# Patient Record
Sex: FEMALE | Race: WHITE | Employment: OTHER | URBAN - METROPOLITAN AREA
[De-identification: names, ages, dates, MRNs, and addresses within clinical notes are randomized per-mention and may not be internally consistent; named-entity substitution may affect disease eponyms.]

---

## 2017-01-02 DIAGNOSIS — R11.0 NAUSEA: Primary | ICD-10-CM

## 2017-01-03 ENCOUNTER — TELEPHONE (OUTPATIENT)
Dept: TRANSPLANT | Facility: CLINIC | Age: 52
End: 2017-01-03

## 2017-01-03 DIAGNOSIS — E13.9 POST-PANCREATECTOMY DIABETES (H): Primary | ICD-10-CM

## 2017-01-03 DIAGNOSIS — K86.89 PANCREATIC INSUFFICIENCY: ICD-10-CM

## 2017-01-03 DIAGNOSIS — E55.9 VITAMIN D DEFICIENCY: ICD-10-CM

## 2017-01-03 DIAGNOSIS — Z90.410 POST-PANCREATECTOMY DIABETES (H): Primary | ICD-10-CM

## 2017-01-03 DIAGNOSIS — E89.1 POST-PANCREATECTOMY DIABETES (H): Primary | ICD-10-CM

## 2017-01-03 NOTE — TELEPHONE ENCOUNTER
Spoke with Chantell about rescheduling her appointment with Dr. Maher. Chantell agreed to an 8:00 morning appointment on 1/19/17. Chantell verbalized understanding of the date and time including arrival at 7:45 to check-in.

## 2017-01-04 RX ORDER — ONDANSETRON 4 MG/1
TABLET, ORALLY DISINTEGRATING ORAL
Qty: 60 TABLET | Refills: 1 | Status: SHIPPED | OUTPATIENT
Start: 2017-01-04 | End: 2017-05-10

## 2017-01-04 NOTE — TELEPHONE ENCOUNTER
ondansetron      Last Written Prescription Date:  8/29/16  Last Fill Quantity: 60,   # refills: 1  Last Office Visit : 12/20/16  Future Office visit:  none

## 2017-01-06 ENCOUNTER — TELEPHONE (OUTPATIENT)
Dept: INTERNAL MEDICINE | Facility: CLINIC | Age: 52
End: 2017-01-06

## 2017-01-07 DIAGNOSIS — E03.9 HYPOTHYROIDISM: Primary | ICD-10-CM

## 2017-01-10 RX ORDER — LEVOTHYROXINE SODIUM 112 UG/1
112 TABLET ORAL DAILY
Qty: 90 TABLET | Refills: 3 | Status: SHIPPED
Start: 2017-01-10 | End: 2018-04-10

## 2017-01-11 NOTE — TELEPHONE ENCOUNTER
levothyroxine      Last Written Prescription Date:  9/22/15  Last Fill Quantity: 100,   # refills: 3  Last Office Visit : 12/20/16  Future Office visit:  NONE  TSH   Date Value Ref Range Status   11/16/2016 0.15* 0.40 - 4.00 mU/L Final

## 2017-01-12 ENCOUNTER — PRE VISIT (OUTPATIENT)
Dept: GASTROENTEROLOGY | Facility: CLINIC | Age: 52
End: 2017-01-12

## 2017-01-12 NOTE — TELEPHONE ENCOUNTER
Re-scheduled from 11/28/16    1.  Date/reason for appt: 2/7/17 - New IBD & Abd Pain  2.  Referring provider: ED/Hospital f/u  3.  Call to patient (Yes / No - short description): no, hospital f/u  4.  Previous care at / records requested from:              - [facility] ED/Hospital -- Records and imaging in Epic/Pacs

## 2017-01-18 DIAGNOSIS — Z90.410 POST-PANCREATECTOMY DIABETES (H): Primary | ICD-10-CM

## 2017-01-18 DIAGNOSIS — E89.1 POST-PANCREATECTOMY DIABETES (H): Primary | ICD-10-CM

## 2017-01-18 DIAGNOSIS — E13.9 POST-PANCREATECTOMY DIABETES (H): Primary | ICD-10-CM

## 2017-01-18 DIAGNOSIS — K86.89 PANCREATIC INSUFFICIENCY: ICD-10-CM

## 2017-01-18 DIAGNOSIS — Z90.410 HISTORY OF PANCREATECTOMY: ICD-10-CM

## 2017-01-19 ENCOUNTER — OFFICE VISIT (OUTPATIENT)
Dept: TRANSPLANT | Facility: CLINIC | Age: 52
End: 2017-01-19
Attending: PEDIATRICS
Payer: MEDICARE

## 2017-01-19 ENCOUNTER — TELEPHONE (OUTPATIENT)
Dept: GASTROENTEROLOGY | Facility: CLINIC | Age: 52
End: 2017-01-19

## 2017-01-19 VITALS
TEMPERATURE: 98.2 F | RESPIRATION RATE: 16 BRPM | OXYGEN SATURATION: 98 % | DIASTOLIC BLOOD PRESSURE: 66 MMHG | HEART RATE: 72 BPM | BODY MASS INDEX: 25.83 KG/M2 | SYSTOLIC BLOOD PRESSURE: 102 MMHG | WEIGHT: 140.4 LBS | HEIGHT: 62 IN

## 2017-01-19 VITALS — WEIGHT: 140.4 LBS | HEIGHT: 62 IN | BODY MASS INDEX: 25.83 KG/M2

## 2017-01-19 DIAGNOSIS — E55.9 VITAMIN D DEFICIENCY: ICD-10-CM

## 2017-01-19 DIAGNOSIS — Z90.410 HISTORY OF PANCREATECTOMY: ICD-10-CM

## 2017-01-19 DIAGNOSIS — E89.1 POST-PANCREATECTOMY DIABETES (H): ICD-10-CM

## 2017-01-19 DIAGNOSIS — E13.9 POST-PANCREATECTOMY DIABETES (H): ICD-10-CM

## 2017-01-19 DIAGNOSIS — K86.89 PANCREATIC INSUFFICIENCY: ICD-10-CM

## 2017-01-19 DIAGNOSIS — Z90.410 POST-PANCREATECTOMY DIABETES (H): ICD-10-CM

## 2017-01-19 DIAGNOSIS — E89.1 POST-PANCREATECTOMY DIABETES (H): Primary | ICD-10-CM

## 2017-01-19 DIAGNOSIS — Z90.410 POST-PANCREATECTOMY DIABETES (H): Primary | ICD-10-CM

## 2017-01-19 DIAGNOSIS — E13.9 POST-PANCREATECTOMY DIABETES (H): Primary | ICD-10-CM

## 2017-01-19 LAB
ALBUMIN SERPL-MCNC: 3.4 G/DL (ref 3.4–5)
ALP SERPL-CCNC: 64 U/L (ref 40–150)
ALT SERPL W P-5'-P-CCNC: 20 U/L (ref 0–50)
ANION GAP SERPL CALCULATED.3IONS-SCNC: 8 MMOL/L (ref 3–14)
AST SERPL W P-5'-P-CCNC: 17 U/L (ref 0–45)
BILIRUB SERPL-MCNC: 0.2 MG/DL (ref 0.2–1.3)
BUN SERPL-MCNC: 9 MG/DL (ref 7–30)
C PEPTIDE SERPL-MCNC: 1.5 NG/ML (ref 0.9–6.9)
C PEPTIDE SERPL-MCNC: 1.9 NG/ML (ref 0.9–6.9)
C PEPTIDE SERPL-MCNC: 2.2 NG/ML (ref 0.9–6.9)
CALCIUM SERPL-MCNC: 8.7 MG/DL (ref 8.5–10.1)
CHLORIDE SERPL-SCNC: 108 MMOL/L (ref 94–109)
CO2 SERPL-SCNC: 27 MMOL/L (ref 20–32)
CREAT SERPL-MCNC: 0.65 MG/DL (ref 0.52–1.04)
ERYTHROCYTE [DISTWIDTH] IN BLOOD BY AUTOMATED COUNT: 17 % (ref 10–15)
FERRITIN SERPL-MCNC: 8 NG/ML (ref 8–252)
GFR SERPL CREATININE-BSD FRML MDRD: ABNORMAL ML/MIN/1.7M2
GLUCOSE SERPL-MCNC: 140 MG/DL (ref 70–99)
GLUCOSE SERPL-MCNC: 153 MG/DL (ref 70–99)
GLUCOSE SERPL-MCNC: 202 MG/DL (ref 70–99)
HBA1C MFR BLD: 5.9 % (ref 4.3–6)
HCT VFR BLD AUTO: 30.6 % (ref 35–47)
HGB BLD-MCNC: 10 G/DL (ref 11.7–15.7)
IRON SATN MFR SERPL: 12 % (ref 15–46)
IRON SERPL-MCNC: 40 UG/DL (ref 35–180)
MCH RBC QN AUTO: 29.9 PG (ref 26.5–33)
MCHC RBC AUTO-ENTMCNC: 32.7 G/DL (ref 31.5–36.5)
MCV RBC AUTO: 92 FL (ref 78–100)
PLATELET # BLD AUTO: 531 10E9/L (ref 150–450)
POTASSIUM SERPL-SCNC: 4 MMOL/L (ref 3.4–5.3)
PREALB SERPL IA-MCNC: 23 MG/DL (ref 15–45)
PROT SERPL-MCNC: 6.6 G/DL (ref 6.8–8.8)
RBC # BLD AUTO: 3.34 10E12/L (ref 3.8–5.2)
SODIUM SERPL-SCNC: 143 MMOL/L (ref 133–144)
TIBC SERPL-MCNC: 344 UG/DL (ref 240–430)
VIT B12 SERPL-MCNC: 1051 PG/ML (ref 193–986)
WBC # BLD AUTO: 5.2 10E9/L (ref 4–11)

## 2017-01-19 PROCEDURE — 40000269 ZZH STATISTIC NO CHARGE FACILITY FEE: Mod: ZF

## 2017-01-19 PROCEDURE — 99211 OFF/OP EST MAY X REQ PHY/QHP: CPT | Mod: ZF

## 2017-01-19 PROCEDURE — 82306 VITAMIN D 25 HYDROXY: CPT | Performed by: PEDIATRICS

## 2017-01-19 PROCEDURE — 84681 ASSAY OF C-PEPTIDE: CPT | Performed by: PEDIATRICS

## 2017-01-19 NOTE — Clinical Note
1/19/2017       RE: Chantell Kidd  [address]  [place] [facility] [address]     Dear Colleague,    Thank you for referring your patient, Chantell Kidd, to the [facility] SOLID ORGAN TRANSPLANT at [facility] acute hospital. Please see a copy of my visit note below.    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     Post-pancreatectomy diabetes (H)     Type 1 diabetes mellitus (H)     Migraine     Abdominal muscle strain     CIERRA (generalized anxiety disorder)     Major depressive disorder, recurrent episode, moderate (H)     CMC DJD(carpometacarpal degenerative joint disease), localized primary     Exocrine pancreatic insufficiency     Hypothyroidism     Hypoglycemia     Mood disorder due to a general medical condition     Decreased oral intake     Odynophagia     Iron deficiency     Anemia, iron deficiency     Adrenal insufficiency (H)     S/P hernia repair     Nausea       HPI:  Chantell is a 51 year old female here for follow up of total pancreatectomy and islet autotransplant performed on January 6, 2012.  At the time of the procedure, the patient received 420,000 IEQ, or 5,438 IEQ/kg body weight.  She did not have diabetes before the procedure.  Early post-operative course was complicated by RLQ with appendicitis, eventually required appendectomy.    Despite the high islet mass transplanted, Chantell's post-operative course was initially remarkable for high insulin needs.  She in fact does have islet function, as previously documented by mixed meal tests, but she was insulin resistant, requiring high doses of insulin when on MDI therapy.  She also had frequent day to day  variability, and fluctuating patterns with illness and healthier times, as well as a complex regimen, all of which make her an excellent candidate for an insulin pump which she started in Feb 2014.  Extremely concerning was an episode of severe hypoglycemia in November 2013 at which time she was found unconscious.  Suspect at that time she was on too much basal insulin and because she was ill and not eating at the time, dropped far too low overnight.   In early 2014, she was started on an insulin pump with CGM.  Remarkably, her insulin needs have dropped dramatically on an insulin pump.  She was then relatively stable until 2016.  She was again admitted to the hospital on March 15 and March 29, 2016 for hypoglycemia, that appears to be secondary to both exogenous insulin and possible central adrenal insufficiency.   At that time she had the following lab findings:  On 3/29/16, elevated insulin with low C-peptide during BG 42 mg/dL around 5pm, and then again same pattern with BG 50s at 10pm.  Chantell is pretty clear that she did not take insuiln that day on 3/29/16. She also had three cortisol levels-- including one during hypoglycemia, one at around 4am (possibly also during hypoglycemia) and one 8am draw that were all low-- all 0.6- 1.6 range.    Of note, she did have a kenalog injection one week earlier on 3/24/16, just a few days prior to that draw and was also receiving narcotics in hospital (but not at home).      Picture is also complicated by non-diabetes history which includes the following (some of which is new since last visit):  - 7/6/2016 EGD identified diffuse candidiasis through entire esophagous.  Pt has also had recurrent oral thrush in 2016  - Recurrent chronic abdominal pain  - Recurrent vomiting of unclear etiology but G-T placed 10/2016 and converted to GJ 11/2016.  She feels much better on GJ feeds.  Unclear if she has any gastroparesis.  Suboptimal study in March 2016 showed 100% retention at 1  hour and then rapid emptying.  She re-establishes care with Dr. Estrada in a few weeks.   - Hernia repair performed by general surgery 9/15/16  - TPIAT team was not involved in above two items, does appear inpatient endo team was consulted to assist with changes in pump settings for the GJ feeds.        New history today:  1)  Diabetes:    Many changes since our last visit.  She was in my clinic 9/15/16.  I found out that day that she was going to surgery (immediately after my visit).  She had struggled in the post-operative period with pain management and complicated BG regimen.  Then apparently she started tube feeds in Oct 2016.  Pump adjustments were made at that time while she was inpatient.  She has then been running very low and unfortunately had not contacted us prior to today's appointment.  This included many episodes of 40-60s in the last 2 weeks, and one <40, and 21% of readings under 70 mg/dL.   She did have one severe hypoglycemic event where she was at the store, had to call her  but could not because she could not get herself functional enough to work her phone.  She was able to convey to another woman in the store that she needed help, and this person called her , and then she remained confused and crouched over, not treating herself until her  arrived and was able to help treat her.   She says she has no warning symptoms at all of lows-- they are either picked up as a meter low or with confusion/ altered mental status.  She would like to wear a sensor but unfortunately cannot do to Medicare coverage.  She is also struggling a bit more than usual for her with the routine daily checks because of the recent complex other medical history but does monitor at least twice on most days.      Current insuiln:  on insulin pump at settings of:  Basal 12a 1.0  Bolus;  I:C 8g- does not use because she only gets nutrition from TF right now  , ISF 50 mg/dL, Target  mg/dL  Total daily  insulin dose:  22.4 units/day, 1% bolus    Feeds:  Peptamen 1.5 at 40 mL/hour  Wt Readings from Last 4 Encounters:   01/19/17 63.685 kg (140 lb 6.4 oz)   01/19/17 63.685 kg (140 lb 6.4 oz)   12/20/16 59.875 kg (132 lb)   11/22/16 57.153 kg (126 lb)     Body mass index is 25.67 kg/(m^2).      Recent hemoglobin A1c levels:  A1C      5.9   1/19/2017  A1C      6.8   11/16/2016  A1C      6.7   9/15/2016  A1C      9.1   8/3/2016  A1C      9.1   7/7/2016          2)  Adrenal insufficiency:  Still unclear if this was transient or true central AI but we have been treating her regardless due to SHE history.  Since our last visit, we have maintained her on 20 mg/day (10 BID) of cortef, with Body surface area is 1.67 meters squared. For daily dose of : 12 mg/m2/day.   She is tolerating this dose well.      3)  Other new concerns include:   Overall is doing better with abdominal pain, but has some left sided pain under rib cage.  Hernia repair site is now fine with minimal pain, no pancreatitis type pain.        Review of systems:  Review Of Systems  Skin: negative  Eyes: negative  Ears/Nose/Throat: negative  Respiratory: No shortness of breath, dyspnea on exertion, cough, or hemoptysis  Cardiovascular: negative  Gastrointestinal: chronic abdominal pain + hernia  Genitourinary: negative  Musculoskeletal: negative  Neurologic: negative  Psychiatric: negative  Hematologic/Lymphatic/Immunologic: negative  Endocrine: as above    Past Medical and Surgical History:  Past Medical History   Diagnosis Date     Migraines      Kidney stones      Depression with anxiety      Chronic abdominal pain      Spasm of sphincter of Oddi      Chronic pancreatitis (H)      S/P pancreatectomy     Diabetes mellitus (H) 1/2012     Gastro-oesophageal reflux disease      Hypothyroidism 4/23/2015     Other chronic pain      STOMACH     Other chronic pain      LUMBAR SPINE     Low serum cortisol level (H)      Past Surgical History   Procedure Laterality  Date     Hysterectomy  1997 or 1998     USO     Cholecystectomy  2004     Endoscopic retrograde cholangiopancreatogram       Endoscopic retrograde cholangiopancreatogram  4/19/2011     Procedure:ENDOSCOPIC RETROGRADE CHOLANGIOPANCREATOGRAM; Pancreatic Stent Placement       Gyn surgery       Hysterectomy and USO     Endoscopic retrograde cholangiopancreatogram  5/26/2011     Procedure:ENDOSCOPIC RETROGRADE CHOLANGIOPANCREATOGRAM; with Pancreatic Stent Removal; Surgeon:DALE MIMS; Location:[facility]     Esophagoscopy, gastroscopy, duodenoscopy (egd), combined  5/26/2011     Procedure:COMBINED ESOPHAGOSCOPY, GASTROSCOPY, DUODENOSCOPY (EGD); Surgeon:DALE MIMS; [facility]     Hc ugi endoscopy w eus  7/20/2011     Procedure:COMBINED ENDOSCOPIC ULTRASOUND, ESOPHAGOSCOPY, GASTROSCOPY, DUODENOSCOPY (EGD); Surgeon:DARVIN DONOHUE; Location[facility]     Pancreatectomy, transplant auto islet cell, combined  1/6/2012     Procedure:COMBINED PANCREATECTOMY, TRANSPLANT AUTO ISLET CELL; Total  Pancreatectomy, Auto Islet Transplant, splenectomy, 18fr. transgastric-jejunal feeding tube placement, liver biopsy; Surgeon:PALAK LEE; Location:[facility] OR     Endoscopy upper, colonoscopy, combined  4/25/2012     Procedure:COMBINED ENDOSCOPY UPPER, COLONOSCOPY; Enteroscopy with Bile Duct Stent Removal, Colonoscopy  *Latex Safe Room*; Surgeon:GRACY GODWIN; Location:[facility] OR     Laparoscopic appendectomy  7/30/2012     Procedure: LAPAROSCOPIC APPENDECTOMY;  Open Appendectomy;  Surgeon: Ron Austin MD;  [facility]: [facility] OR     Incision and drainage abdomen washout, combined  8/16/2012     Procedure: COMBINED INCISION AND DRAINAGE ABDOMEN WASHOUT;  ,debridement and Drainage Post Appendectomy;  Surgeon: Ron Austin MD;  Location: [facility] OR     Arthroplasty carpometacarpal (thumb joint)  5/2/2014     Procedure: ARTHROPLASTY CARPOMETACARPAL (THUMB JOINT);  Surgeon: Carina Panda MD;  Location:  OR      Colonoscopy  7/18/2014     Procedure: COLONOSCOPY;  Surgeon: Aurora Sahu MD;  Location: [facility]     Esophagoscopy, gastroscopy, duodenoscopy (egd), combined N/A 10/30/2014     Procedure: COMBINED ESOPHAGOSCOPY, GASTROSCOPY, DUODENOSCOPY (EGD), BIOPSY SINGLE OR MULTIPLE;  Surgeon: Sarai Moon MD;  [facility]: [facility]     Esophagoscopy, gastroscopy, duodenoscopy (egd), combined Left 7/6/2015     Procedure: COMBINED ESOPHAGOSCOPY, GASTROSCOPY, DUODENOSCOPY (EGD), BIOPSY SINGLE OR MULTIPLE;  Surgeon: Thomas Estrada MD;  Location: [facility]     Splenectomy       Inject transversus abdominis plane (tap) block bilateral Bilateral 5/26/2016     Procedure: INJECT TRANSVERSUS ABDOMINIS PLANE (TAP) BLOCK BILATERAL;  Surgeon: Leonard Mccallum MD;  [facility]     Esophagoscopy, gastroscopy, duodenoscopy (egd), combined N/A 7/8/2016     Procedure: COMBINED ESOPHAGOSCOPY, GASTROSCOPY, DUODENOSCOPY (EGD), BIOPSY SINGLE OR MULTIPLE;  Surgeon: Eloy Klein MD;  [facility]     Esophagoscopy, gastroscopy, duodenoscopy (egd), combined N/A 8/4/2016     Procedure: COMBINED ESOPHAGOSCOPY, GASTROSCOPY, DUODENOSCOPY (EGD), BIOPSY SINGLE OR MULTIPLE;  Surgeon: Jason Brown MD;  [facility]: [facility]     Herniorrhaphy ventral N/A 9/15/2016     Procedure: HERNIORRHAPHY VENTRAL;  Surgeon: Juanita Bernabe MD;  Location: [facility]       Family History:  New changes since last visit:  none  Family History   Problem Relation Age of Onset     Hypertension Mother      DIABETES Mother      OSTEOPOROSIS Mother      CANCER Father      pancreatic cancer     DIABETES Maternal Grandmother      Cardiovascular Maternal Grandmother      CANCER Maternal Grandfather      lung cancer     CANCER Sister      brain     CANCER Sister      liver cancer       Social History:  Social History     Social History Narrative     Unchanged. Lives in [place] with her .  Has 4 grandchildren and  "another on the way.      Physical Exam:  Vitals: /66 mmHg  Pulse 72  Temp(Src) 98.2  F (36.8  C) (Oral)  Resp 16  Ht 1.575 m (5' 2\")  Wt 63.685 kg (140 lb 6.4 oz)  BMI 25.67 kg/m2  SpO2 98%  BMI= Body mass index is 25.67 kg/(m^2).     General:  Well-appearing, NAD  Head: NC/AT  Eyes: sclera white  Neuro:  Normal mental status, normal gross motor  Psych:  Communicative, with normal affect     Results:  Mixed Meal Test:  C PEPTIDE   Date Value Ref Range Status   01/19/2017 1.9 0.9 - 6.9 ng/mL Final   01/19/2017 2.2 0.9 - 6.9 ng/mL Final   01/19/2017 1.5 0.9 - 6.9 ng/mL Final   04/07/2016 2.3 0.9 - 6.9 ng/mL Final   04/07/2016 2.2 0.9 - 6.9 ng/mL Final     GLUCOSE   Date Value Ref Range Status   01/19/2017 140* 70 - 99 mg/dL Final   01/19/2017 153* 70 - 99 mg/dL Final   01/19/2017 202* 70 - 99 mg/dL Final     Comment:     Fasting specimen   12/20/2016 180* 70 - 99 mg/dL Final   12/01/2016 73 70 - 99 mg/dL Final       Hemoglobin A1c  A1C      5.9   1/19/2017  A1C      6.8   11/16/2016  A1C      6.7   9/15/2016  A1C      9.1   8/3/2016  A1C      9.1   7/7/2016    Liver enzymes  AST       17   1/19/2017  ALT       20   1/19/2017  BILICONJ      0.0   5/20/2014   BILITOTAL      0.2   1/19/2017  ALBUMIN      3.4   1/19/2017  PROTTOTAL      6.6   1/19/2017   ALKPHOS       64   1/19/2017    Creatinine:  CREATININE   Date Value Ref Range Status   01/19/2017 0.65 0.52 - 1.04 mg/dL Final   ]    Complete Blood Count:  CBC RESULTS:   Recent Labs   Lab Test  01/19/17   0739   WBC  5.2   RBC  3.34*   HGB  10.0*   HCT  30.6*   MCV  92   MCH  29.9   MCHC  32.7   RDW  17.0*   PLT  531*       Cholesterol  CHOL      236   4/14/2016  HDL      100   4/14/2016  LDL      120   4/14/2016  TRIG       80   4/14/2016  CHOLHDLRATIO      3.2   1/8/2015        Assessment:  1. Post-pancreatectomy diabetes mellitus - partial islet graft function but highly variable insulin needs depending on health status  2.  S/p islet transplant with " partial function (autologous)   3.  Possible central adrenal insufficiency due to low cortisol during hypoglycemia (also suspect excess exogenous insulin exposure at that time), has not yet been retested, remains on treatment  4.  Hypoglycemia unawareness        Chantell is a 50 year old with history of chronic pancreatitis who is s/p total pancreatectomy and islet autotransplant, complicated by insulin resistance, and several episodes of severe hypoglycemia with partial islet graft function.   We would really recommend CGM therapy for her given her labile course, but unfortunately this is not covered by Medicare.      Chantell appears to be more insulin sensitive at this point, actually running quite low despite being on continuous TF, so we reduced pump basal by 20% and she should email me in 1 week, as per below.  She needs to get back on a pancreatic MVI.   We did not discuss who is managing her tube feeds but I am concerned that she is now gained weight to a BMI of 25 kg/m2 and wonder if we need to readdress the current feed regimen to avoid excess weight gain-- though the daily calories seems fairly standard at the 40 mL/hour x 24h of Peptamen 1.5.    She needs to follow up with GI given her vomiting, new GJ, and also fall in hemgolobin from 11.7 to 10.0 (though in reviewing this, does appear that the 11.7 may have been higher than her usual baseline).    There is something odd about her baseline glucose being elevated when she has had so many lows-- possibly reactive to counter-regulation or treatment of a low (not true fasting) but we may need to repeat C-peptide/ glucose for insurance purposes on a different morning fasting so we are looking into this.  We do want her to keep her insulin pump coverage as she was extremely difficult to manage successfully on MDI and has been under easier control with the insulin pump.      Plan:  1.  Changes to current diabetes regimen:  Patient Instructions     1)  Your numbers are  running low again, even despite the round the clock tube feeds.   We reduced the basal rate to 0.8 units per hour.  2)  If you ever had a disruption or clog in your tube feeds, then I would change the basal rate to 0.3 unit per hour until you get the tube feeds running again.  3)  Send me #s by email in  A week.  4)  We have the mixed meal and vitamin studies pending today.  5)  Need to get back on a vitamin since you can no longer get the Source CF.  Recommend free vitamin supply through [employer]--  Sign up for free W vitamin.   Take 1 pill twice a day.    6)  We are waiting on most of your labs, but I am a bit worried given your GI history that your hgb has dropped from 11.7 to 10.0 in 1 month.  You do have an appointment with GI in Feb.    Follow Up:  April 20 at 8am        2.  Frequency of blood sugar checks:  Premeal, bedtime, and additional measurements PRN-- Should have strips sufficient to test 8 times per day given her labiltiy history.    3.  Continue routine follow up for autoislet transplant patients:  Mixed meal test (6 mL/kg BoostHP to max of 360 mL) at 3 months, 6 months, and once a year post transplant.  Hemoglobin A1c levels at these time points and quarterly.    4.  Other issues addressed today:  As above        Follow up:  3 months    Contact me for questions at [phone] or [phone].  Emergency number to reach pediatric endocrinology after hours is [phone].        Amena Maher MD  , Pediatric Endocrinology and Diabetes  formerly [facility] [place]  [facility]      VISIT TIME:  25 minutes of face to face time, >50% spent in counseling and coordination of care    Again, thank you for allowing me to participate in the care of your patient.      Sincerely,    Amena Maher MD

## 2017-01-19 NOTE — NURSING NOTE
"Chief Complaint   Patient presents with     TP-IAT Transplant     1/06/2012       Initial /66 mmHg  Pulse 72  Temp(Src) 98.2  F (36.8  C) (Oral)  Resp 16  Ht 1.575 m (5' 2\")  Wt 63.685 kg (140 lb 6.4 oz)  BMI 25.67 kg/m2  SpO2 98% Estimated body mass index is 25.67 kg/(m^2) as calculated from the following:    Height as of this encounter: 1.575 m (5' 2\").    Weight as of this encounter: 63.685 kg (140 lb 6.4 oz).  BP completed using cuff size: regular    "

## 2017-01-19 NOTE — PATIENT INSTRUCTIONS
1)  Your numbers are running low again, even despite the round the clock tube feeds.   We reduced the basal rate to 0.8 units per hour.  2)  If you ever had a disruption or clog in your tube feeds, then I would change the basal rate to 0.3 unit per hour until you get the tube feeds running again.  3)  Send me #s by email in  A week.  4)  We have the mixed meal and vitamin studies pending today.  5)  Need to get back on a vitamin since you can no longer get the Source CF.  Recommend free vitamin supply through [employer]--  Sign up for free [facility] vitamin.   Take 1 pill twice a day.    6)  We are waiting on most of your labs, but I am a bit worried given your GI history that your hgb has dropped from 11.7 to 10.0 in 1 month.  You do have an appointment with GI in Feb.    Follow Up:  April 20 at 8am

## 2017-01-19 NOTE — MR AVS SNAPSHOT
After Visit Summary   1/19/2017    Chantell Kidd    MRN: [id number]           Patient Information     Date Of Birth          1965        Visit Information        Provider Department      1/19/2017 8:00 AM Amena Maher MD [facility]        Today's Diagnoses     Post-pancreatectomy diabetes (H)    -  1     History of pancreatectomy           Care Instructions      1)  Your numbers are running low again, even despite the round the clock tube feeds.   We reduced the basal rate to 0.8 units per hour.  2)  If you ever had a disruption or clog in your tube feeds, then I would change the basal rate to 0.3 unit per hour until you get the tube feeds running again.  3)  Send me #s by email in  A week.  4)  We have the mixed meal and vitamin studies pending today.  5)  Need to get back on a vitamin since you can no longer get the Source CF.  Recommend free vitamin supply through [employer]--  Sign up for free W vitamin.   Take 1 pill twice a day.    6)  We are waiting on most of your labs, but I am a bit worried given your GI history that your hgb has dropped from 11.7 to 10.0 in 1 month.  You do have an appointment with GI in Feb.    Follow Up:  April 20 at 8am        Follow-ups after your visit        Your next 10 appointments already scheduled     Jan 19, 2017  8:45 AM   Nurse Visit with [facility] Nurse   [facility] ([facility] [place])    [address] [place] [address]  3rd [facility] [id number] [phone]            Feb 07, 2017 10:00 AM   (Arrive by 9:45 AM)   IRRITABLE BOWEL DISEASE with Thomas Estrada MD   [facility] ([facility])    [address] [facility]  4th [facility] [id number] [phone]              Who to contact     If you have questions or need follow up information about today's clinic visit or your schedule please contact [facility] SOL[facility] ORGAN  "TRANSPLANT directly at [phone].  Normal or non-critical lab and imaging results will be communicated to you by MyChart, letter or phone within 4 business days after the clinic has received the results. If you do not hear from us within 7 days, please contact the clinic through [employer] or phone. If you have a critical or abnormal lab result, we will notify you by phone as soon as possible.  Submit refill requests through [employer] or call your pharmacy and they will forward the refill request to us. Please allow 3 business days for your refill to be completed.          Additional Information About Your Visit        [employer] Information     [employer] gives you secure access to your electronic health record. If you see a primary care provider, you can also send messages to your care team and make appointments. If you have questions, please call your primary care clinic.  If you do not have a primary care provider, please call [phone] and they will assist you.        Care EveryWhere ID     This is your Care EveryWhere ID. This could be used by other organizations to access your [facility] medical records  [id number]        Your Vitals Were     Pulse Temperature Respirations Height BMI (Body Mass Index) Pulse Oximetry    72 98.2  F (36.8  C) (Oral) 16 1.575 m (5' 2\") 25.67 kg/m2 98%       Blood Pressure from Last 3 Encounters:   01/19/17 102/66   12/20/16 99/67   12/01/16 105/69    Weight from Last 3 Encounters:   01/19/17 63.685 kg (140 lb 6.4 oz)   12/20/16 59.875 kg (132 lb)   11/22/16 57.153 kg (126 lb)              Today, you had the following     No orders found for display       Primary Care Provider Office Phone # Fax #    Ron Morgan [phone]        [facility] [address] [facility] [address]  [facility] [address]        Thank you!     Thank you for choosing [facility] ORGAN TRANSPL[facility]  for your care. Our goal is always to provide you with excellent care. Hearing back from our " patients is one way we can continue to improve our services. Please take a few minutes to complete the written survey that you may receive in the mail after your visit with us. Thank you!             Your Updated Medication List - Protect others around you: Learn how to safely use, store and throw away your medicines at www.disposemymeds.org.          This list is accurate as of: 1/19/17  8:30 AM.  Always use your most recent med list.                   Brand Name Dispense Instructions for use    acetaminophen 500 MG Caps      Take 1,000 mg by mouth three times a day as needed.       alendronate 70 MG tablet    FOSAMAX    4 tablet    Take 1 tablet (70 mg) by mouth every 7 days On Sundays take first thing in the morning with plain water and remain upright for at least 30 minutes and until after first food of the day  Do not restart Fosamax (alendronate) until your difficulty swallowing has resolved and you have finished the entire course of fluconazole (Diflucan).       alum & mag hydroxide-simethicone 200-200-25 MG Chew chewable tablet    MYLANTA/MAALOX    100 tablet    Chew and swallow two chews by mouth every 4 hrs prn indigestion       amylase-lipase-protease 24413 UNITS Cpep    CREON 12    2160 capsule    Take 6 capsules with meals and 3 with snacks.  This is up to 24 capsules per day.       aspirin 81 MG tablet      Take 81 mg by mouth daily.       blood glucose monitoring lancets     102 each    by Lancet route. Use to test blood sugar daily or as directed.       blood glucose monitoring test strip    no brand specified    240 each    Use to test blood glucoses 6-8 times per day.       * BOOST HIGH PROTEIN Liqd      After above baseline labs are drawn, give: 6 mL/kg to maximum of 360 mL; the beverage is to be consumed within 5 minutes.       * BOOST HIGH PROTEIN Liqd      Also can use Ensure clear (available over the counter)       * BOOST HIGH PROTEIN Liqd      After above baseline labs are drawn, give: 6  mL/kg to maximum of 360 mL; the beverage is to be consumed within 5 minutes.       cyclobenzaprine 5 MG tablet    FLEXERIL    42 tablet    Take 1 tablet (5 mg) by mouth 3 times daily as needed for muscle spasms       diclofenac 1 % Gel topical gel    VOLTAREN     2 g Apply 2 g to skin four times a day as needed (to affected upper extremity joint(s)). Maximum 8g/day per joint, 16g/day total.       dicyclomine 10 MG capsule    BENTYL    40 capsule    TAKE ONE CAPSULE BY MOUTH EVERY 6 HOURS AS NEEDED       docusate sodium 100 MG capsule    DOCQLACE    120 capsule    Take 1 capsule (100 mg) by mouth daily as needed for constipation       dronabinol 2.5 MG capsule    MARINOL    56 capsule    Take 2 capsules (5 mg) by mouth 2 times daily as needed       * DULoxetine 30 MG EC capsule    CYMBALTA    90 capsule    Take 1 capsule (30 mg) by mouth daily With 60mg capsule for total dose of 90mg       * DULoxetine 60 MG EC capsule    CYMBALTA    90 capsule    Take 1 capsule (60 mg) by mouth daily       furosemide 20 MG tablet    LASIX    60 tablet    Take 1 tablet (20 mg) by mouth 2 times daily       hydrocortisone 10 MG tablet    CORTEF    60 tablet    Take 10 mg in the morning and 10 mg at bedtime. Watch for hypoglycemia recurrence.       insulin aspart 100 UNITS/ML injection    NovoLOG VIAL    36 vial    As directed in pump       insulin pen needle 31G X 5 MM     2 Box    RX# [id number]  Pen Needle UC 31G UF IV Mini  Use 4-8 needles per day for insulin injections.       levothyroxine 112 MCG tablet    SYNTHROID/LEVOTHROID    90 tablet    Take 1 tablet (112 mcg) by mouth daily       linaclotide 145 MCG capsule    LINZESS    30 capsule    Take 1 capsule (145 mcg) by mouth every morning (before breakfast)       metoclopramide 5 MG tablet    REGLAN    100 tablet    Take 2 tablets (10 mg) by mouth 3 times daily (before meals)       morphine 0.1% in solosite topical gel     25 g    Place 1 g onto the skin 4 times daily as needed for  pain       nystatin 69353 unit/0.5mL Susp suspension    MYCOSTATIN    60 mL    Take 1 mL (100,000 Units) by mouth 4 times daily       ondansetron 4 MG ODT tab    ZOFRAN-ODT    60 tablet    DISSOLVE ONE TABLET ON THE TONGUE EVERY 6 HOURS AS NEEDED FOR NAUSEA       oxyCODONE 5 MG IR tablet    ROXICODONE    50 tablet    Take 1 tablet (5 mg) by mouth every 6 hours as needed       pantoprazole 40 MG EC tablet    PROTONIX    180 tablet    Take 1 tablet (40 mg) by mouth 2 times daily       polyethylene glycol Packet    MIRALAX/GLYCOLAX    14 each    Take 1 packet by mouth 2 times daily as needed for constipation       potassium chloride SA 20 MEQ CR tablet    K-DUR/KLOR-CON M    100 tablet    Take 1 tablet (20 mEq) by mouth daily       pregabalin 150 MG capsule    LYRICA    90 capsule    Take 1 capsule (150 mg) by mouth 3 times daily       senna 8.6 MG tablet    SENOKOT    30 tablet    Take 1-2 tablets by mouth daily as needed for constipation       sodium bicarbonate 325 MG tablet     270 tablet    1 tablet (325 mg) by Per Feeding Tube route every 4 hours       SUMAtriptan 50 MG tablet    IMITREX    30 tablet    Take 1 tablet (50 mg) by mouth at onset of headache for migraine Take 1 Tab by mouth Once as needed for Migraine Headache. May repeat after two hours.  Maximum dose 200 mg/24 hours.       topiramate 100 MG tablet    TOPAMAX    180 tablet    Take 1 tablet (100 mg) by mouth 2 times daily       traZODone 100 MG tablet    DESYREL    100 tablet    Take 1.5 tablets (150 mg) by mouth At Bedtime Take one to two tablets by mouth an hour before bedtime       * Notice:  This list has 5 medication(s) that are the same as other medications prescribed for you. Read the directions carefully, and ask your doctor or other care provider to review them with you.

## 2017-01-19 NOTE — NURSING NOTE
Chantell Kidd arrived fasting for labs, vitals obtained, instructions provided for mixed meal test.  Patient stated understanding of the plan.  Fasting labs drawn, patient consumed 360 ml of BOOST per providers instruction.  Patient instructed to return to the lab at 1hr (09:15 AM) and 2hr (10:15 AM) post prandial and remain fasting until completion of the test with exception of enzymes needed or pain medication.

## 2017-01-19 NOTE — PROGRESS NOTES
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     Post-pancreatectomy diabetes (H)     Type 1 diabetes mellitus (H)     Migraine     Abdominal muscle strain     ICERRA (generalized anxiety disorder)     Major depressive disorder, recurrent episode, moderate (H)     CMC DJD(carpometacarpal degenerative joint disease), localized primary     Exocrine pancreatic insufficiency     Hypothyroidism     Hypoglycemia     Mood disorder due to a general medical condition     Decreased oral intake     Odynophagia     Iron deficiency     Anemia, iron deficiency     Adrenal insufficiency (H)     S/P hernia repair     Nausea       HPI:  Chantell is a 51 year old female here for follow up of total pancreatectomy and islet autotransplant performed on January 6, 2012.  At the time of the procedure, the patient received 420,000 IEQ, or 5,438 IEQ/kg body weight.  She did not have diabetes before the procedure.  Early post-operative course was complicated by RLQ with appendicitis, eventually required appendectomy.    Despite the high islet mass transplanted, Chantell's post-operative course was initially remarkable for high insulin needs.  She in fact does have islet function, as previously documented by mixed meal tests, but she was insulin resistant, requiring high doses of insulin when on MDI therapy.  She also had frequent day to day variability, and fluctuating patterns with illness and healthier times, as well as a complex regimen, all of which make her an excellent candidate for an insulin pump which she started in Feb 2014.  Extremely concerning was an episode of severe hypoglycemia in November 2013 at which time  she was found unconscious.  Suspect at that time she was on too much basal insulin and because she was ill and not eating at the time, dropped far too low overnight.   In early 2014, she was started on an insulin pump with CGM.  Remarkably, her insulin needs have dropped dramatically on an insulin pump.  She was then relatively stable until 2016.  She was again admitted to the hospital on March 15 and March 29, 2016 for hypoglycemia, that appears to be secondary to both exogenous insulin and possible central adrenal insufficiency.   At that time she had the following lab findings:  On 3/29/16, elevated insulin with low C-peptide during BG 42 mg/dL around 5pm, and then again same pattern with BG 50s at 10pm.  Chantell is pretty clear that she did not take insuiln that day on 3/29/16. She also had three cortisol levels-- including one during hypoglycemia, one at around 4am (possibly also during hypoglycemia) and one 8am draw that were all low-- all 0.6- 1.6 range.    Of note, she did have a kenalog injection one week earlier on 3/24/16, just a few days prior to that draw and was also receiving narcotics in hospital (but not at home).      Picture is also complicated by non-diabetes history which includes the following (some of which is new since last visit):  - 7/6/2016 EGD identified diffuse candidiasis through entire esophagous.  Pt has also had recurrent oral thrush in 2016  - Recurrent chronic abdominal pain  - Recurrent vomiting of unclear etiology but G-T placed 10/2016 and converted to GJ 11/2016.  She feels much better on GJ feeds.  Unclear if she has any gastroparesis.  Suboptimal study in March 2016 showed 100% retention at 1 hour and then rapid emptying.  She re-establishes care with Dr. Estrada in a few weeks.   - Hernia repair performed by general surgery 9/15/16  - TPIAT team was not involved in above two items, does appear inpatient endo team was consulted to assist with changes in pump settings for the GJ  feeds.        New history today:  1)  Diabetes:    Many changes since our last visit.  She was in my clinic 9/15/16.  I found out that day that she was going to surgery (immediately after my visit).  She had struggled in the post-operative period with pain management and complicated BG regimen.  Then apparently she started tube feeds in Oct 2016.  Pump adjustments were made at that time while she was inpatient.  She has then been running very low and unfortunately had not contacted us prior to today's appointment.  This included many episodes of 40-60s in the last 2 weeks, and one <40, and 21% of readings under 70 mg/dL.   She did have one severe hypoglycemic event where she was at the store, had to call her  but could not because she could not get herself functional enough to work her phone.  She was able to convey to another woman in the store that she needed help, and this person called her , and then she remained confused and crouched over, not treating herself until her  arrived and was able to help treat her.   She says she has no warning symptoms at all of lows-- they are either picked up as a meter low or with confusion/ altered mental status.  She would like to wear a sensor but unfortunately cannot do to Medicare coverage.  She is also struggling a bit more than usual for her with the routine daily checks because of the recent complex other medical history but does monitor at least twice on most days.      Current insuiln:  on insulin pump at settings of:  Basal 12a 1.0  Bolus;  I:C 8g- does not use because she only gets nutrition from TF right now  , ISF 50 mg/dL, Target  mg/dL  Total daily insulin dose:  22.4 units/day, 1% bolus    Feeds:  Peptamen 1.5 at 40 mL/hour  Wt Readings from Last 4 Encounters:   01/19/17 63.685 kg (140 lb 6.4 oz)   01/19/17 63.685 kg (140 lb 6.4 oz)   12/20/16 59.875 kg (132 lb)   11/22/16 57.153 kg (126 lb)     Body mass index is 25.67  kg/(m^2).      Recent hemoglobin A1c levels:  A1C      5.9   1/19/2017  A1C      6.8   11/16/2016  A1C      6.7   9/15/2016  A1C      9.1   8/3/2016  A1C      9.1   7/7/2016          2)  Adrenal insufficiency:  Still unclear if this was transient or true central AI but we have been treating her regardless due to SHE history.  Since our last visit, we have maintained her on 20 mg/day (10 BID) of cortef, with Body surface area is 1.67 meters squared. For daily dose of : 12 mg/m2/day.   She is tolerating this dose well.      3)  Other new concerns include:   Overall is doing better with abdominal pain, but has some left sided pain under rib cage.  Hernia repair site is now fine with minimal pain, no pancreatitis type pain.        Review of systems:  Review Of Systems  Skin: negative  Eyes: negative  Ears/Nose/Throat: negative  Respiratory: No shortness of breath, dyspnea on exertion, cough, or hemoptysis  Cardiovascular: negative  Gastrointestinal: chronic abdominal pain + hernia  Genitourinary: negative  Musculoskeletal: negative  Neurologic: negative  Psychiatric: negative  Hematologic/Lymphatic/Immunologic: negative  Endocrine: as above    Past Medical and Surgical History:  Past Medical History   Diagnosis Date     Migraines      Kidney stones      Depression with anxiety      Chronic abdominal pain      Spasm of sphincter of Oddi      Chronic pancreatitis (H)      S/P pancreatectomy     Diabetes mellitus (H) 1/2012     Gastro-oesophageal reflux disease      Hypothyroidism 4/23/2015     Other chronic pain      STOMACH     Other chronic pain      LUMBAR SPINE     Low serum cortisol level (H)      Past Surgical History   Procedure Laterality Date     Hysterectomy  1997 or 1998     USO     Cholecystectomy  2004     Endoscopic retrograde cholangiopancreatogram       Endoscopic retrograde cholangiopancreatogram  4/19/2011     Procedure:ENDOSCOPIC RETROGRADE CHOLANGIOPANCREATOGRAM; Pancreatic Stent Placement       Gyn  surgery       Hysterectomy and USO     Endoscopic retrograde cholangiopancreatogram  5/26/2011     Procedure:ENDOSCOPIC RETROGRADE CHOLANGIOPANCREATOGRAM; with Pancreatic Stent Removal; Surgeon:DALE MIMS; Location:[facility]     Esophagoscopy, gastroscopy, duodenoscopy (egd), combined  5/26/2011     Procedure:COMBINED ESOPHAGOSCOPY, GASTROSCOPY, DUODENOSCOPY (EGD); Surgeon:DALE MIMS; [facility]     Hc ugi endoscopy w eus  7/20/2011     Procedure:COMBINED ENDOSCOPIC ULTRASOUND, ESOPHAGOSCOPY, GASTROSCOPY, DUODENOSCOPY (EGD); Surgeon:DARVIN DONOHUE; Location[facility]     Pancreatectomy, transplant auto islet cell, combined  1/6/2012     Procedure:COMBINED PANCREATECTOMY, TRANSPLANT AUTO ISLET CELL; Total  Pancreatectomy, Auto Islet Transplant, splenectomy, 18fr. transgastric-jejunal feeding tube placement, liver biopsy; Surgeon:PALAK LEE; Location[facility]     Endoscopy upper, colonoscopy, combined  4/25/2012     Procedure:COMBINED ENDOSCOPY UPPER, COLONOSCOPY; Enteroscopy with Bile Duct Stent Removal, Colonoscopy  *Latex Safe Room*; Surgeon:GRACY GODWIN; Location:[facility]     Laparoscopic appendectomy  7/30/2012     Procedure: LAPAROSCOPIC APPENDECTOMY;  Open Appendectomy;  Surgeon: Ron Austin MD;  [facility]: [facility]     Incision and drainage abdomen washout, combined  8/16/2012     Procedure: COMBINED INCISION AND DRAINAGE ABDOMEN WASHOUT;  ,debridement and Drainage Post Appendectomy;  Surgeon: Ron Austin MD;  [facility]: [facility]     Arthroplasty carpometacarpal (thumb joint)  5/2/2014     Procedure: ARTHROPLASTY CARPOMETACARPAL (THUMB JOINT);  Surgeon: Carina Panda MD;  Location: [facility]     Colonoscopy  7/18/2014     Procedure: COLONOSCOPY;  Surgeon: Aurora Sahu MD;  [facility]: [facility]     Esophagoscopy, gastroscopy, duodenoscopy (egd), combined N/A 10/30/2014     Procedure: COMBINED ESOPHAGOSCOPY, GASTROSCOPY, DUODENOSCOPY (EGD), BIOPSY SINGLE OR  "MULTIPLE;  Surgeon: Sarai Moon MD;  [facility]     Esophagoscopy, gastroscopy, duodenoscopy (egd), combined Left 7/6/2015     Procedure: COMBINED ESOPHAGOSCOPY, GASTROSCOPY, DUODENOSCOPY (EGD), BIOPSY SINGLE OR MULTIPLE;  Surgeon: Thomas Estrada MD;  [facility]: [facility]     Splenectomy       Inject transversus abdominis plane (tap) block bilateral Bilateral 5/26/2016     Procedure: INJECT TRANSVERSUS ABDOMINIS PLANE (TAP) BLOCK BILATERAL;  Surgeon: Leonard Mccallum MD;  Location:  OR     Esophagoscopy, gastroscopy, duodenoscopy (egd), combined N/A 7/8/2016     Procedure: COMBINED ESOPHAGOSCOPY, GASTROSCOPY, DUODENOSCOPY (EGD), BIOPSY SINGLE OR MULTIPLE;  Surgeon: Eloy Klein MD;  [facility]     Esophagoscopy, gastroscopy, duodenoscopy (egd), combined N/A 8/4/2016     Procedure: COMBINED ESOPHAGOSCOPY, GASTROSCOPY, DUODENOSCOPY (EGD), BIOPSY SINGLE OR MULTIPLE;  Surgeon: Jason Brown MD;  [facility]     Herniorrhaphy ventral N/A 9/15/2016     Procedure: HERNIORRHAPHY VENTRAL;  Surgeon: Juanita Bernabe MD;  [facility]       Family History:  New changes since last visit:  none  Family History   Problem Relation Age of Onset     Hypertension Mother      DIABETES Mother      OSTEOPOROSIS Mother      CANCER Father      pancreatic cancer     DIABETES Maternal Grandmother      Cardiovascular Maternal Grandmother      CANCER Maternal Grandfather      lung cancer     CANCER Sister      brain     CANCER Sister      liver cancer       Social History:  Social History     Social History Narrative     Unchanged. Lives in [place] with her .  Has 4 grandchildren and another on the way.      Physical Exam:  Vitals: /66 mmHg  Pulse 72  Temp(Src) 98.2  F (36.8  C) (Oral)  Resp 16  Ht 1.575 m (5' 2\")  Wt 63.685 kg (140 lb 6.4 oz)  BMI 25.67 kg/m2  SpO2 98%  BMI= Body mass index is 25.67 kg/(m^2).     General:  Well-appearing, " NAD  Head: NC/AT  Eyes: sclera white  Neuro:  Normal mental status, normal gross motor  Psych:  Communicative, with normal affect     Results:  Mixed Meal Test:  C PEPTIDE   Date Value Ref Range Status   01/19/2017 1.9 0.9 - 6.9 ng/mL Final   01/19/2017 2.2 0.9 - 6.9 ng/mL Final   01/19/2017 1.5 0.9 - 6.9 ng/mL Final   04/07/2016 2.3 0.9 - 6.9 ng/mL Final   04/07/2016 2.2 0.9 - 6.9 ng/mL Final     GLUCOSE   Date Value Ref Range Status   01/19/2017 140* 70 - 99 mg/dL Final   01/19/2017 153* 70 - 99 mg/dL Final   01/19/2017 202* 70 - 99 mg/dL Final     Comment:     Fasting specimen   12/20/2016 180* 70 - 99 mg/dL Final   12/01/2016 73 70 - 99 mg/dL Final       Hemoglobin A1c  A1C      5.9   1/19/2017  A1C      6.8   11/16/2016  A1C      6.7   9/15/2016  A1C      9.1   8/3/2016  A1C      9.1   7/7/2016    Liver enzymes  AST       17   1/19/2017  ALT       20   1/19/2017  BILICONJ      0.0   5/20/2014   BILITOTAL      0.2   1/19/2017  ALBUMIN      3.4   1/19/2017  PROTTOTAL      6.6   1/19/2017   ALKPHOS       64   1/19/2017    Creatinine:  CREATININE   Date Value Ref Range Status   01/19/2017 0.65 0.52 - 1.04 mg/dL Final   ]    Complete Blood Count:  CBC RESULTS:   Recent Labs   Lab Test  01/19/17   0739   WBC  5.2   RBC  3.34*   HGB  10.0*   HCT  30.6*   MCV  92   MCH  29.9   MCHC  32.7   RDW  17.0*   PLT  531*       Cholesterol  CHOL      236   4/14/2016  HDL      100   4/14/2016  LDL      120   4/14/2016  TRIG       80   4/14/2016  CHOLHDLRATIO      3.2   1/8/2015        Assessment:  1. Post-pancreatectomy diabetes mellitus - partial islet graft function but highly variable insulin needs depending on health status  2.  S/p islet transplant with partial function (autologous)   3.  Possible central adrenal insufficiency due to low cortisol during hypoglycemia (also suspect excess exogenous insulin exposure at that time), has not yet been retested, remains on treatment  4.  Hypoglycemia unawareness        Chantell is a 50  year old with history of chronic pancreatitis who is s/p total pancreatectomy and islet autotransplant, complicated by insulin resistance, and several episodes of severe hypoglycemia with partial islet graft function.   We would really recommend CGM therapy for her given her labile course, but unfortunately this is not covered by Medicare.      Chantell appears to be more insulin sensitive at this point, actually running quite low despite being on continuous TF, so we reduced pump basal by 20% and she should email me in 1 week, as per below.  She needs to get back on a pancreatic MVI.   We did not discuss who is managing her tube feeds but I am concerned that she is now gained weight to a BMI of 25 kg/m2 and wonder if we need to readdress the current feed regimen to avoid excess weight gain-- though the daily calories seems fairly standard at the 40 mL/hour x 24h of Peptamen 1.5.    She needs to follow up with GI given her vomiting, new GJ, and also fall in hemgolobin from 11.7 to 10.0 (though in reviewing this, does appear that the 11.7 may have been higher than her usual baseline).    There is something odd about her baseline glucose being elevated when she has had so many lows-- possibly reactive to counter-regulation or treatment of a low (not true fasting) but we may need to repeat C-peptide/ glucose for insurance purposes on a different morning fasting so we are looking into this.  We do want her to keep her insulin pump coverage as she was extremely difficult to manage successfully on MDI and has been under easier control with the insulin pump.      Plan:  1.  Changes to current diabetes regimen:  Patient Instructions     1)  Your numbers are running low again, even despite the round the clock tube feeds.   We reduced the basal rate to 0.8 units per hour.  2)  If you ever had a disruption or clog in your tube feeds, then I would change the basal rate to 0.3 unit per hour until you get the tube feeds running  again.  3)  Send me #s by email in  A week.  4)  We have the mixed meal and vitamin studies pending today.  5)  Need to get back on a vitamin since you can no longer get the Source CF.  Recommend free vitamin supply through [employer]--  Sign up for free MVW vitamin.   Take 1 pill twice a day.    6)  We are waiting on most of your labs, but I am a bit worried given your GI history that your hgb has dropped from 11.7 to 10.0 in 1 month.  You do have an appointment with GI in Feb.    Follow Up:  April 20 at 8am        2.  Frequency of blood sugar checks:  Premeal, bedtime, and additional measurements PRN-- Should have strips sufficient to test 8 times per day given her labiltiy history.    3.  Continue routine follow up for autoislet transplant patients:  Mixed meal test (6 mL/kg BoostHP to max of 360 mL) at 3 months, 6 months, and once a year post transplant.  Hemoglobin A1c levels at these time points and quarterly.    4.  Other issues addressed today:  As above        Follow up:  3 months    Contact me for questions at [phone] or [phone].  Emergency number to reach pediatric endocrinology after hours is [phone].        Amena Maher MD  , Pediatric Endocrinology and Diabetes  [facility]  [facility]      VISIT TIME:  25 minutes of face to face time, >50% spent in counseling and coordination of care

## 2017-01-20 ENCOUNTER — TELEPHONE (OUTPATIENT)
Dept: TRANSPLANT | Facility: CLINIC | Age: 52
End: 2017-01-20

## 2017-01-20 NOTE — TELEPHONE ENCOUNTER
Sent Chantell an e-mail with Dr. Maher's instructions to repeat her fasting blood glucose and fasting C-Peptide. Verified that the [facility] lab will take Chantell as a walk-in. Will follow up with Chantell to make sure she understands and answer her questions.    Suspend the tube feeds at 10pm  -- At the same time change the pump basal rate to a temporary basal of 35% x 12 hours, or you can reset your basal rate setting to 0.35 unit/hour and then change it back to 0.8 the next day after you restart the tube feeds  -- You should check your AM fasting BG, make sure this is <125 mg/dL, and if it is then go into lab fasting and repeat the fasting glucose and C-peptide.  Can be done at nearest  lab

## 2017-01-20 NOTE — TELEPHONE ENCOUNTER
Spoke with Chantell and she had turned her tube feeds off at 12 AM the night of her boost test on 1/19/17. She reported sleeping through the night, gave herself no corrections, remained NPO, and kept her insulin pump running at 1.

## 2017-01-21 LAB — PANC ISLET CELL AB TITR SER: NORMAL {TITER}

## 2017-01-22 LAB
A-TOCOPHEROL VIT E SERPL-MCNC: 11.9
ANNOTATION COMMENT IMP: NORMAL
BETA+GAMMA TOCOPHEROL SERPL-MCNC: 0.9 MG/DL
FOLATE RBC-MCNC: 755 NG/ML
HCT VFR BLD CALC: NORMAL %
RETINYL PALMITATE SERPL-MCNC: 0.06 UG/ML
VIT A SERPL-MCNC: 0.41 UG/ML

## 2017-01-23 DIAGNOSIS — R10.84 ABDOMINAL PAIN, GENERALIZED: Primary | ICD-10-CM

## 2017-01-23 RX ORDER — CYCLOBENZAPRINE HCL 5 MG
5 TABLET ORAL 3 TIMES DAILY PRN
Qty: 42 TABLET | Refills: 3 | Status: SHIPPED
Start: 2017-01-23 | End: 2017-09-09

## 2017-01-23 NOTE — TELEPHONE ENCOUNTER
cyclobenzaprine (FLEXERIL) 5 MG       Last Written Prescription Date:  10/10/16  Last Fill Quantity: 42,   # refills: 1  Last Office Visit : 12/20/16  Future Office visit:  NONE    Routing refill request to provider for review/approval because:  Drug not on refill protocoL

## 2017-01-25 ENCOUNTER — TELEPHONE (OUTPATIENT)
Dept: TRANSPLANT | Facility: CLINIC | Age: 52
End: 2017-01-25

## 2017-01-25 DIAGNOSIS — K86.89 PANCREATIC INSUFFICIENCY: ICD-10-CM

## 2017-01-25 DIAGNOSIS — Z90.410 HISTORY OF PANCREATECTOMY: ICD-10-CM

## 2017-01-25 DIAGNOSIS — E13.9 POST-PANCREATECTOMY DIABETES (H): Primary | ICD-10-CM

## 2017-01-25 DIAGNOSIS — Z90.410 POST-PANCREATECTOMY DIABETES (H): Primary | ICD-10-CM

## 2017-01-25 DIAGNOSIS — E89.1 POST-PANCREATECTOMY DIABETES (H): Primary | ICD-10-CM

## 2017-01-25 LAB
DEPRECATED CALCIDIOL+CALCIFEROL SERPL-MC: NORMAL UG/L (ref 20–75)
VITAMIN D2 SERPL-MCNC: <5 UG/L
VITAMIN D3 SERPL-MCNC: 38 UG/L

## 2017-01-25 NOTE — TELEPHONE ENCOUNTER
Left ELSA and contact information for Chantell to return my call. Following up on whether she has gone to her local lab to have her fasting glucose and C-peptide redrawn.

## 2017-02-01 ENCOUNTER — TELEPHONE (OUTPATIENT)
Dept: TRANSPLANT | Facility: CLINIC | Age: 52
End: 2017-02-01

## 2017-02-01 NOTE — TELEPHONE ENCOUNTER
Sent Chantell an e-mail following up if she has been into her local clinic to have her fasting glucose and c-peptide redrawn.

## 2017-02-06 DIAGNOSIS — Z94.9 CELL TRANSPLANT: Primary | ICD-10-CM

## 2017-02-06 RX ORDER — SODIUM BICARBONATE 325 MG/1
325 TABLET ORAL EVERY 4 HOURS
Qty: 270 TABLET | Refills: 3 | Status: SHIPPED | OUTPATIENT
Start: 2017-02-06 | End: 2017-05-17

## 2017-02-07 ENCOUNTER — CARE COORDINATION (OUTPATIENT)
Dept: SURGERY | Facility: CLINIC | Age: 52
End: 2017-02-07

## 2017-02-07 NOTE — PROGRESS NOTES
Patient called office with concern about feeding tube that was placed by Dr. Rodriguez on 10/24/16.  She is calling the office with new concerns.  Chantell states that over the past 24 hours she had noted a small amount of red bloody drainage on the gauze dressing , which is requiring her to change the gauze dressing twice daily.  She is keeping the area clean and dry.  Additionally, she notes heaping granulation tissue in the area.  Today there was a small amount of leakage from her po intake which resembled coffee.  She does not have a fever or purulent drainage.  Her discomfort is at her baseline (back, joint, etc) which she is medicating with OTC.    The patient will keep the area clean and dry and replace the gauze dressing as needed. An appointment has been arranged for tomorrow with Dr. Barron for an evaluation and possible Silver Nitrate of the excess granulation tissue.  All of her questions were answered.

## 2017-02-08 ENCOUNTER — CARE COORDINATION (OUTPATIENT)
Dept: SURGERY | Facility: CLINIC | Age: 52
End: 2017-02-08

## 2017-02-08 NOTE — PROGRESS NOTES
Patient was scheduled to see Dr. Bernabe (General Surgery as Dr. Rodriguez is unavailable) this morning regarding concerns with her G-tube.  The patient cancelled the appointment via MyChart.

## 2017-02-10 ENCOUNTER — MYC MEDICAL ADVICE (OUTPATIENT)
Dept: INTERNAL MEDICINE | Facility: CLINIC | Age: 52
End: 2017-02-10

## 2017-02-10 DIAGNOSIS — G89.29 CHRONIC GENERALIZED ABDOMINAL PAIN: Primary | ICD-10-CM

## 2017-02-10 DIAGNOSIS — R10.84 CHRONIC GENERALIZED ABDOMINAL PAIN: Primary | ICD-10-CM

## 2017-02-10 RX ORDER — PREGABALIN 150 MG/1
150 CAPSULE ORAL 3 TIMES DAILY
Qty: 90 CAPSULE | Refills: 5 | Status: SHIPPED | OUTPATIENT
Start: 2017-02-10 | End: 2020-11-08

## 2017-02-14 ENCOUNTER — TELEPHONE (OUTPATIENT)
Dept: TRANSPLANT | Facility: CLINIC | Age: 52
End: 2017-02-14

## 2017-02-14 NOTE — TELEPHONE ENCOUNTER
Faxed Chantell's [facility] [employer].

## 2017-02-17 ENCOUNTER — TELEPHONE (OUTPATIENT)
Dept: TRANSPLANT | Facility: CLINIC | Age: 52
End: 2017-02-17

## 2017-02-17 NOTE — TELEPHONE ENCOUNTER
Chantell,    Have you gotten back to the clinic to get your labs redrawn?    Thank you.    Malini Julien RN BSN  Phone [phone]. Pager [phone]  Toll free [phone]. Fax    Transplant Coordinator for Chronic pancreatitis /Islet Auto Transplant Program  [address] [place] [address] Room 2-200. [address] [address]

## 2017-02-20 DIAGNOSIS — R60.9 EDEMA, UNSPECIFIED TYPE: ICD-10-CM

## 2017-02-21 ENCOUNTER — TELEPHONE (OUTPATIENT)
Dept: TRANSPLANT | Facility: CLINIC | Age: 52
End: 2017-02-21

## 2017-02-21 RX ORDER — FUROSEMIDE 20 MG
20 TABLET ORAL 2 TIMES DAILY
Qty: 180 TABLET | Refills: 2 | Status: SHIPPED | OUTPATIENT
Start: 2017-02-21 | End: 2019-03-15

## 2017-02-21 NOTE — TELEPHONE ENCOUNTER
furosemide (LASIX) 20 MG      Last Written Prescription Date:  7/29/16  Last Fill Quantity: 60,   # refills: 1  Last Office Visit : 12/20/16  Future Office visit:  none  BP Readings from Last 3 Encounters:   01/19/17 102/66   12/20/16 99/67   12/01/16 105/69     Creatinine   Date Value Ref Range Status   01/19/2017 0.65 0.52 - 1.04 mg/dL Final     Potassium   Date Value Ref Range Status   01/19/2017 4.0 3.4 - 5.3 mmol/L Final   Results for MONET ZHANG (MRN [id number]) as of 2/21/2017 14:31   Ref. Range 1/19/2017 07:39   Sodium Latest Ref Range: 133 - 144 mmol/L 143

## 2017-02-21 NOTE — TELEPHONE ENCOUNTER
Sent e-mail to Chantell strongly urging her to lala in for repeat fasting glucose and fasting c-peptide as values done in her 1/19/17 Boost test needed to be repeated per Dr. Maher. I have communicated with Chantell several times since initial conversation in January. Chantell has assured me she will get to her local clinic (orders sent), to have this done. She is not driving, had told me her daughter would take her when visiting, but she has not gone in. This writer has explained the consequence of not repeating these labs is that she may lose Medicare coverage of her insulin pump.

## 2017-02-27 ENCOUNTER — TELEPHONE (OUTPATIENT)
Dept: SURGERY | Facility: CLINIC | Age: 52
End: 2017-02-27

## 2017-02-27 ENCOUNTER — TELEPHONE (OUTPATIENT)
Dept: INTERNAL MEDICINE | Facility: CLINIC | Age: 52
End: 2017-02-27

## 2017-02-27 NOTE — TELEPHONE ENCOUNTER
Feeding tube leaking around site  and abdominal swelling. Pt is having stools and passing gas.  Site of feeding tube red and swelling.  Pt will call surgery clinic who placed the feeding tube.Clinic number given.  Serena Cannon RN 4:36 PM on 2/27/2017.

## 2017-02-27 NOTE — TELEPHONE ENCOUNTER
Received call from patient at home. Today she noticed water leaking from around her Gtube site after drinking. She had her tube placed in October. And has not had previous problems. Over the past week she has been experiencing abdominal distention and discomfort. Today she noticed a thick yellow/green discharge from around the Gtube site, as well as some bleeding. She has abdominal discomfort when she pushes on her belly. Denies rigidity/chest pain/sob. Advised patient to go to ED. She said the [place] ED is the closest, and will have her  drive her.    Ludwig Mohr MD  General Surgery  [phone]

## 2017-03-01 ENCOUNTER — TELEPHONE (OUTPATIENT)
Dept: SURGERY | Facility: CLINIC | Age: 52
End: 2017-03-01

## 2017-03-01 NOTE — TELEPHONE ENCOUNTER
Chantell Kidd is a patient of Dr. Rodriguez who had a feeding tube placed 10/24/16. She is in contact with clinic at this time with concerns regarding her feeding tube. She is experiencing abdominal pain and increased pain at feeding tube site. She notices excess skin coming from the tube site and bloody drainage around the site. She states when she sips water, there is more drainage running down her stomach. For the past week she has been noticing blood in her urine as well as her stool. She believes she has developed a oral yeast infection, tongue is swollen. She denies any fevers at this time. Discussed following up with primary care, she was told to contact general surgery clinic. Advised patient to be seen in ER department for further evaluation, she is in agreement with this plan and will call our office with any further questions or concerns.

## 2017-03-04 DIAGNOSIS — G89.29 CHRONIC BACK PAIN, UNSPECIFIED BACK LOCATION, UNSPECIFIED BACK PAIN LATERALITY: ICD-10-CM

## 2017-03-04 DIAGNOSIS — F51.01 PRIMARY INSOMNIA: ICD-10-CM

## 2017-03-04 DIAGNOSIS — R53.81 PHYSICAL DECONDITIONING: ICD-10-CM

## 2017-03-04 DIAGNOSIS — M54.9 CHRONIC BACK PAIN, UNSPECIFIED BACK LOCATION, UNSPECIFIED BACK PAIN LATERALITY: ICD-10-CM

## 2017-03-04 DIAGNOSIS — K59.00 CONSTIPATION, UNSPECIFIED CONSTIPATION TYPE: ICD-10-CM

## 2017-03-06 NOTE — TELEPHONE ENCOUNTER
linaclotide (LINZESS) 145 MCG      Last Written Prescription Date:  12/20/16  Last Fill Quantity: 30,   # refills: 1  Last Office Visit : 12/20/16  Future Office visit:  none  Routing refill request to provider for review/approval because:  Drug not on  refill protocol

## 2017-03-11 DIAGNOSIS — Z00.00 ROUTINE HEALTH MAINTENANCE: ICD-10-CM

## 2017-03-11 NOTE — TELEPHONE ENCOUNTER
dronabinol (MARINOL) 2.5 MG capsule   Last Written Prescription Date:  12/13/16  Last Fill Quantity: 56,   # refills: 0  Last Office Visit with [facility], [facility] prescribing provider: 12/20/16  Future Office visit:   none    Routing refill request to provider for review/approval because:    dronabinol (MARINOL) 2.5 MG capsule. Not on [facility] protocol

## 2017-03-14 RX ORDER — DRONABINOL 2.5 MG/1
5 CAPSULE ORAL 2 TIMES DAILY PRN
Qty: 56 CAPSULE | Refills: 0 | Status: SHIPPED | OUTPATIENT
Start: 2017-03-14 | End: 2017-06-26

## 2017-03-18 DIAGNOSIS — R10.9 ABDOMINAL PAIN: ICD-10-CM

## 2017-03-20 NOTE — TELEPHONE ENCOUNTER
MYLANTA/MAALOX 200-200-25 MG      Last Written Prescription Date:  7/29/16  Last Fill Quantity: 100,   # refills: 1  Last Office Visit : 12/20/16  Future Office visit:  NONE

## 2017-03-21 DIAGNOSIS — K86.81 EXOCRINE PANCREATIC INSUFFICIENCY: ICD-10-CM

## 2017-03-25 DIAGNOSIS — R10.13 ABDOMINAL PAIN, EPIGASTRIC: ICD-10-CM

## 2017-03-27 RX ORDER — DICYCLOMINE HYDROCHLORIDE 10 MG/1
CAPSULE ORAL
Qty: 40 CAPSULE | Refills: 3 | OUTPATIENT
Start: 2017-03-27

## 2017-04-03 ENCOUNTER — TELEPHONE (OUTPATIENT)
Dept: TRANSPLANT | Facility: CLINIC | Age: 52
End: 2017-04-03

## 2017-04-03 NOTE — TELEPHONE ENCOUNTER
Left M and contact information for Chantell to return my call regarding lab tests needing to be repeated.

## 2017-04-04 ENCOUNTER — TELEPHONE (OUTPATIENT)
Dept: TRANSPLANT | Facility: CLINIC | Age: 52
End: 2017-04-04

## 2017-04-04 ENCOUNTER — OFFICE VISIT (OUTPATIENT)
Dept: WOUND CARE | Facility: CLINIC | Age: 52
End: 2017-04-04

## 2017-04-04 ENCOUNTER — OFFICE VISIT (OUTPATIENT)
Dept: GASTROENTEROLOGY | Facility: CLINIC | Age: 52
End: 2017-04-04

## 2017-04-04 VITALS
HEIGHT: 62 IN | BODY MASS INDEX: 24.9 KG/M2 | HEART RATE: 78 BPM | DIASTOLIC BLOOD PRESSURE: 75 MMHG | OXYGEN SATURATION: 98 % | TEMPERATURE: 98.2 F | WEIGHT: 135.3 LBS | SYSTOLIC BLOOD PRESSURE: 114 MMHG

## 2017-04-04 DIAGNOSIS — R10.84 ABDOMINAL PAIN, GENERALIZED: ICD-10-CM

## 2017-04-04 DIAGNOSIS — K94.23 LEAKING PEG TUBE (H): Primary | ICD-10-CM

## 2017-04-04 DIAGNOSIS — R11.2 NAUSEA AND VOMITING, INTRACTABILITY OF VOMITING NOT SPECIFIED, UNSPECIFIED VOMITING TYPE: Primary | ICD-10-CM

## 2017-04-04 DIAGNOSIS — R11.2 NAUSEA WITH VOMITING: ICD-10-CM

## 2017-04-04 LAB
BASOPHILS # BLD AUTO: 0.1 10E9/L (ref 0–0.2)
BASOPHILS NFR BLD AUTO: 1 %
DIFFERENTIAL METHOD BLD: ABNORMAL
EOSINOPHIL # BLD AUTO: 0.2 10E9/L (ref 0–0.7)
EOSINOPHIL NFR BLD AUTO: 1.9 %
ERYTHROCYTE [DISTWIDTH] IN BLOOD BY AUTOMATED COUNT: 13.2 % (ref 10–15)
HCT VFR BLD AUTO: 36.8 % (ref 35–47)
HGB BLD-MCNC: 12 G/DL (ref 11.7–15.7)
IMM GRANULOCYTES # BLD: 0 10E9/L (ref 0–0.4)
IMM GRANULOCYTES NFR BLD: 0.4 %
LYMPHOCYTES # BLD AUTO: 3.1 10E9/L (ref 0.8–5.3)
LYMPHOCYTES NFR BLD AUTO: 38.5 %
MCH RBC QN AUTO: 30.2 PG (ref 26.5–33)
MCHC RBC AUTO-ENTMCNC: 32.6 G/DL (ref 31.5–36.5)
MCV RBC AUTO: 93 FL (ref 78–100)
MONOCYTES # BLD AUTO: 0.5 10E9/L (ref 0–1.3)
MONOCYTES NFR BLD AUTO: 6 %
NEUTROPHILS # BLD AUTO: 4.2 10E9/L (ref 1.6–8.3)
NEUTROPHILS NFR BLD AUTO: 52.2 %
NRBC # BLD AUTO: 0 10*3/UL
NRBC BLD AUTO-RTO: 0 /100
PLATELET # BLD AUTO: 452 10E9/L (ref 150–450)
RBC # BLD AUTO: 3.97 10E12/L (ref 3.8–5.2)
WBC # BLD AUTO: 8 10E9/L (ref 4–11)

## 2017-04-04 ASSESSMENT — ENCOUNTER SYMPTOMS
SPEECH CHANGE: 0
SMELL DISTURBANCE: 0
LOSS OF CONSCIOUSNESS: 0
SEIZURES: 0
NAUSEA: 1
ABDOMINAL PAIN: 1
SKIN CHANGES: 0
DEPRESSION: 1
MUSCLE WEAKNESS: 1
HOARSE VOICE: 1
HEARTBURN: 0
HEMATURIA: 1
NERVOUS/ANXIOUS: 1
TACHYCARDIA: 0
SLEEP DISTURBANCES DUE TO BREATHING: 0
SORE THROAT: 1
CLAUDICATION: 0
BACK PAIN: 1
CONSTIPATION: 1
PALPITATIONS: 0
CHILLS: 1
HYPERTENSION: 0
LIGHT-HEADEDNESS: 1
MEMORY LOSS: 0
ALTERED TEMPERATURE REGULATION: 1
PARALYSIS: 0
LEG SWELLING: 1
NECK MASS: 0
WEIGHT LOSS: 0
TROUBLE SWALLOWING: 1
ORTHOPNEA: 0
BRUISES/BLEEDS EASILY: 1
JAUNDICE: 0
DIFFICULTY URINATING: 1
STIFFNESS: 1
DIARRHEA: 0
NUMBNESS: 0
POOR WOUND HEALING: 0
INSOMNIA: 1
LEG PAIN: 0
JOINT SWELLING: 1
POLYPHAGIA: 0
MUSCLE CRAMPS: 1
SINUS PAIN: 0
POLYDIPSIA: 0
BLOATING: 1
MYALGIAS: 1
SWOLLEN GLANDS: 0
VOMITING: 1
TASTE DISTURBANCE: 1
BOWEL INCONTINENCE: 1
ARTHRALGIAS: 1
TREMORS: 0
TINGLING: 0
HOT FLASHES: 1
FATIGUE: 1
RECTAL BLEEDING: 1
DECREASED CONCENTRATION: 0
NIGHT SWEATS: 1
RECTAL PAIN: 1
DECREASED LIBIDO: 0
FLANK PAIN: 1
DIZZINESS: 1
BLOOD IN STOOL: 1
PANIC: 1
WEAKNESS: 1
SYNCOPE: 0
WEIGHT GAIN: 0
FEVER: 0
HYPOTENSION: 0
DISTURBANCES IN COORDINATION: 1
HEADACHES: 1
DYSURIA: 1
NAIL CHANGES: 1
HALLUCINATIONS: 0
SINUS CONGESTION: 0
NECK PAIN: 0
EXERCISE INTOLERANCE: 1
DECREASED APPETITE: 0
INCREASED ENERGY: 1

## 2017-04-04 ASSESSMENT — PAIN SCALES - GENERAL: PAINLEVEL: SEVERE PAIN (7)

## 2017-04-04 NOTE — TELEPHONE ENCOUNTER
Yobani Abdul,    Have you had a chance to get over to the [facility] to have your fasting glucose and C-peptide? I haven t seen them come through.   I know I mentioned this before, but Medicare must have documentation of these or you may lose your pump coverage.    Please respond.    Thank you.    Malini

## 2017-04-04 NOTE — NURSING NOTE
"Chief Complaint   Patient presents with     Consult     New Consultation       Vitals:    04/04/17 0800   BP: 114/75   BP Location: Left arm   Patient Position: Chair   Cuff Size: Adult Regular   Pulse: 78   Temp: 98.2  F (36.8  C)   SpO2: 98%   Weight: 61.4 kg (135 lb 4.8 oz)   Height: 1.575 m (5' 2\")       Body mass index is 24.75 kg/(m^2).      Maggie Cabrera"

## 2017-04-04 NOTE — PROGRESS NOTES
GI CLINIC VISIT - NEW PATIENT     CC/REFERRING MD: Dr. Morgan     REASON FOR CONSULTATION: iron deficiency anemia, chronic abdominal pain     HPI: 49 year old female with a complicated PMH including idiopathic chronic pancreatitis s/p EUS and multiple ERCPs with eventual total pancreatectomy with auto-islet cell transplant in 1/2012. She has had continued issues with chronic abdominal pain, nausea/vomiting and also iron deficiency anemia. She is here today to follow up. Please see my previous notes for full details of her work up. She has had CTs, MRCPs, US abd, EGDs and colonoscopies.    Since our last visit she had hypoglycemia and weight loss and she had a PEG tube placed. This was later changed to a GJ tube. She did have some pain at the site but this has improved. Overall doing well with the tube and feeds. She has gained weight. She does note that if she drinks liquid right before showering she will have some leaking from around the tube while taking a shower. Her nausea is well controlled now.    She does note over the last month she has had black stools. Soft. She is taking iron in vitamin pill but no other iron and no peptobismol. Also has some red blood in stool at times. Mixed in stool and also on toilet paper. Her colonoscopy was limited by poor prep last year.    She has also noted recurrent oral thrush in her mouth over the last year. She takes nystatin swish and swallow which resolves symptoms. Sometimes does have some pain with swallowing with this but none now. EGD in 8/2016 was normal. EGD in 7/2016 did show some candida esophagitis.    She is off all narcotics!         ROS: 10pt ROS performed and otherwise negative.       PERTINENT PAST MEDICAL HISTORY:  1. Chronic abdominal pain with a history of idiopathic chronic pancreatitis, status post total pancreatectomy and auto islet cell transplant. She did have a barrier stent that was removed via ERCP later that year in 2012.   2. History of  appendicitis with ruptured appendix in mid 2012, status post appendectomy.   3. She has relatively brittle diabetes, status post her total pancreatectomy and auto islet cell transplant.   4. History of migraines for which she takes the Tylenol.   5. General anxiety disorder.   6. Major depressive disorder.   7. GERD for which she takes a PPI.   8. Chronic back pain  9. Oral and esophageal candidiasis     PREVIOUS ABDOMINAL/GYNECOLOGIC SURGERIES:  1. Total pancreatectomy and auto islet cell transplant as above.   2. Status post cholecystectomy about 5 years ago.   3. Status post appendectomy in mid 2012.      PREVIOUS ENDOSCOPY:  As reviewed above     PERTINENT MEDICATIONS:  Medications reviewed with patient today, see Medication List/Assessment for details.     Current Outpatient Prescriptions   Medication     amylase-lipase-protease (CREON 12) 22242 UNITS CPEP     alum & mag hydroxide-simethicone (GELUSIL) 200-200-25 MG CHEW chewable tablet     dronabinol (MARINOL) 2.5 MG capsule     linaclotide (LINZESS) 145 MCG capsule     furosemide (LASIX) 20 MG tablet     pregabalin (LYRICA) 150 MG capsule     sodium bicarbonate 325 MG tablet     cyclobenzaprine (FLEXERIL) 5 MG tablet     levothyroxine (SYNTHROID/LEVOTHROID) 112 MCG tablet     ondansetron (ZOFRAN-ODT) 4 MG ODT tab     Nutritional Supplements (BOOST HIGH PROTEIN) LIQD     traZODone (DESYREL) 100 MG tablet     DULoxetine (CYMBALTA) 30 MG EC capsule     DULoxetine (CYMBALTA) 60 MG EC capsule     morphine 0.1% in solosite     nystatin (MYCOSTATIN) 74500 unit/0.5mL SUSP     senna (SENOKOT) 8.6 MG tablet     oxyCODONE (ROXICODONE) 5 MG immediate release tablet     hydrocortisone (CORTEF) 10 MG tablet     docusate sodium (DOCQLACE) 100 MG capsule     pantoprazole (PROTONIX) 40 MG enteric coated tablet     dicyclomine (BENTYL) 10 MG capsule     topiramate (TOPAMAX) 100 MG tablet     potassium chloride SA (K-DUR,KLOR-CON M) 20 MEQ tablet     alendronate (FOSAMAX) 70 MG  "tablet     blood glucose monitoring (NO BRAND SPECIFIED) test strip     insulin aspart (NOVOLOG VIAL) 100 UNITS/ML VIAL     Nutritional Supplements (BOOST HIGH PROTEIN) LIQD     SUMAtriptan (IMITREX) 50 MG tablet     metoclopramide (REGLAN) 5 MG tablet     Nutritional Supplements (BOOST HIGH PROTEIN) LIQD     acetaminophen 500 MG CAPS     diclofenac (VOLTAREN) 1 % GEL     aspirin 81 MG tablet     insulin pen needle needle     polyethylene glycol (MIRALAX/GLYCOLAX) packet     ACCU-CHEK MULTICLIX LANCETS MISC     No current facility-administered medications for this visit.        No other NSAID/anticoagulation reported by patient.  No other OTC/herbal/supplements reported by patient.     SOCIAL HISTORY:  On disability. Denies tobacco or marijuana. Denies IV drug use. Denies alcohol.     FAMILY HISTORY:  Father with a history of pancreatic cancer. She believes one sister of hers  of liver cancer at the age of 21. She does not know any details of whether she had any other liver disease. She also has a sister with breast cancer.      PHYSICAL EXAMINATION:  Vitals reviewed, AFVSS  /75 (BP Location: Left arm, Patient Position: Chair, Cuff Size: Adult Regular)  Pulse 78  Temp 98.2  F (36.8  C)  Ht 1.575 m (5' 2\")  Wt 61.4 kg (135 lb 4.8 oz)  SpO2 98%  BMI 24.75 kg/m2  Gen: aaox3, cooperative, pleasant, not dyspneic/diaphoretic, nad  HEENT: ncat, neck supple, no clad/sclad, normal op w/o ulcer/exudate, anicteric, mmm  Resp/CV unremarkable  Abd: Soft, non-distended, overweight, tender to palpation in RUQ; no r/g  Ext: no c/c/e  Back: no point tenderness over vertebrae, no CVA tenderness b/l; tenderness in right lumbar paraspinal muscles  Skin: warm, perfused, no jaundice; GJ tube healthy - some extra granulation tissue  Neuro: grossly intact, no asterixis noted     PERTINENT STUDIES:  Reviewed in epic  Labs reviewed. Ferritin single digits. Hgb 9-11.    TSH 1.52  ESR 20-38  CRP < 2.9  INR WNL     ERLINDA - " neg  AMA - neg  f-actin - neg  Ceruloplasmin WNL  Alpha-1-antitrypsin neg     MRCP:   IMPRESSION:   1. Postoperative changes of total pancreatectomy, splenectomy,  cholecystectomy and hepaticojejunostomy. No intrahepatic or  extrahepatic biliary dilatation.  2. Extensive hepatic steatosis with patchy peripheral geographic areas  of focal fatty sparing.     US 3/15  Liver: The liver measures 16 cm demonstrates increased echogenicity  and heterogeneous echotexture. The portal triads are poorly  visualized. No evidence of a focal hepatic mass. The main portal vein  is patent measuring 1.2 cm with antegrade flow measuring 44 cm/s.  Gallbladder: Surgically absent.  Bile Ducts: Both the intra- and extrahepatic biliary system are of  normal caliber. The common bile duct measures 5.6 mm in diameter.  Pancreas: Surgically Absent   Kidneys: Both kidneys are of normal echotexture, without mass or  hydronephrosis. The craniocaudal dimensions are: right- 9.9 cm,  left- 9.9 cm.  Spleen: Surgically absent.  Aorta and IVC: The visualized portions of the IVC is unremarkable .  The IVC measures 1.7 cm in diameter. The proximal aorta was not  visualized  Fluid: No evidence of ascites or pleural effusions.     US with doppler 9/2014  FINDINGS: The liver measures 13.5 cm in length and has diffusely  mildly increased echogenicity. There is no evidence for intrahepatic  mass or biliary dilatation. There is pneumobilia which is stable  compared to prior CT dated 5/29/2014 and is related to postoperative  reconnection of biliary system. The gallbladder is surgically absent..  . The pancreas is surgically absent. The spleen is surgically  absent. The aorta and inferior vena cava are visualized. The right  kidney measures 11.5 x 5.0 x 5.5 cm and the left kidney measures 11.4  x 4.3 x 4.7 cm. There is normal echogenicity with no evidence for  hydronephrosis, mass or stone in either kidney.  Abdominal Doppler:  The hepatic vascular system is  patent and has appropriate antegrade  flow. The flow velocities are as follows:  Main hepatic artery 120 cm/s with a resistive index of 0.75  Splenic vein is surgically absent  Main portal vein 37 cm/s  Right portal vein 25 cm/s  Left portal vein 11 cm/s  The middle, left, and right hepatic veins are patent.  IMPRESSION:   1. Hepatic steatosis.  2. Stable pneumobilia related to prior surgical changes.  3. Status post auto islet cell transplant, pancreatectomy,  cholecystectomy, and splenectomy.  4. Normal hepatic Dopplers.     CT abd/pelvis 5/2014  IMPRESSION  Impression: Postsurgical changes of Venessa-en-Y gastric bypass,  pancreatectomy, and cholecystectomy. No small bowel obstruction or  other explanation for patient's abdominal pain.    GES: could not eat enough eggs.     ASSESSMENT/PLAN:  49 year old female with a complicated PMH including idiopathic chronic pancreatitis s/p EUS and multiple ERCPs with eventual total pancreatectomy with auto-islet cell transplant in 1/2012. She is here to follow up for multiple issuss.     1. Chronic pain syndrome  -congratulated Chantell on being off narcotics. I think this is likely why nausea is better (and the GJ tube)    2. Constipation  -increase miralax to 1 capful daily. Can titrate up to 3 times daily as needed  -add citrucel  -ok to continue Linzess   -hopefully can taper off senna    3. Granulation tissue at GJ - wound care is seeing pt to use silver nitrate on granulation tissue today    4. Melena and BRBPR  -check CBC  -schedule EGD and colonoscopy. BRBPR sounds mainly like hemorrhoids but will ensure nothing else on colonoscopy. EGD for melena     RTC in 6 months or sooner if needed     Thank you for this consultation. It was a pleasure to participate in the care of this patient; please contact us with any further questions.      Thomas Estrada MD    [facility]   Department of Gastroenterology, Hepatology and  Nutrition    Answers for HPI/ROS submitted by the patient on 4/4/2017   General Symptoms: Yes  Skin Symptoms: Yes  HENT Symptoms: Yes  EYE SYMPTOMS: No  HEART SYMPTOMS: Yes  LUNG SYMPTOMS: No  INTESTINAL SYMPTOMS: Yes  URINARY SYMPTOMS: Yes  GYNECOLOGIC SYMPTOMS: Yes  BREAST SYMPTOMS: No  SKELETAL SYMPTOMS: Yes  BLOOD SYMPTOMS: Yes  NERVOUS SYSTEM SYMPTOMS: Yes  MENTAL HEALTH SYMPTOMS: Yes  Fever: No  Loss of appetite: No  Weight loss: No  Weight gain: No  Fatigue: Yes  Night sweats: Yes  Chills: Yes  Increased stress: Yes  Excessive hunger: No  Excessive thirst: No  Feeling hot or cold when others believe the temperature is normal: Yes  Loss of height: No  Post-operative complications: No  Surgical site pain: No  Hallucinations: No  Change in or Loss of Energy: Yes  Hyperactivity: No  Confusion: No  Changes in hair: Yes  Changes in moles/birth marks: No  Itching: No  Rashes: No  Changes in nails: Yes  Acne: No  Hair in places you don't want it: No  Change in facial hair: No  Warts: No  Non-healing sores: No  Scarring: No  Flaking of skin: No  Color changes of hands/feet in cold : No  Sun sensitivity: No  Skin thickening: No  Ear pain: No  Ear discharge: No  Hearing loss: No  Tinnitus: No  Nosebleeds: No  Congestion: No  Sinus pain: No  Trouble swallowing: Yes   Voice hoarseness: Yes  Mouth sores: Yes  Sore throat: Yes  Tooth pain: No  Gum tenderness: No  Bleeding gums: No  Change in taste: Yes  Change in sense of smell: No  Dry mouth: Yes  Hearing aid used: No  Neck lump: No  Chest pain or pressure: No  Fast or irregular heartbeat: No  Pain in legs with walking: No  Swelling in feet or ankles: Yes  Trouble breathing while lying down: No  Fingers or Toes appear blue: No  High blood pressure: No  Low blood pressure: No  Fainting: No  Murmurs: No  Chest pain on exertion: No  Chest pain at rest: No  Cramping pain in leg during exercise: No  Pacemaker: No  Varicose veins: No  Edema or swelling: Yes  Fast heart beat:  No  Wake up at night with shortness of breath: No  Heart flutters: No  Light-headedness: Yes  Exercise intolerance: Yes  Heart burn or indigestion: No  Nausea: Yes  Vomiting: Yes  Abdominal pain: Yes  Bloating: Yes  Constipation: Yes  Diarrhea: No  Blood in stool: Yes  Black stools: Yes  Rectal or Anal pain: Yes  Fecal incontinence: Yes  Rectal bleeding: Yes  Yellowing of skin or eyes: No  Vomit with blood: No  Change in stools: No  Hemorrhoids: No  Trouble holding urine or incontinence: Yes  Pain or burning: Yes  Trouble starting or stopping: Yes  Increased frequency of urination: Yes  Blood in urine: Yes  Decreased frequency of urination: No  Frequent nighttime urination: Yes  Flank pain: Yes  Difficulty emptying bladder: Yes  Back pain: Yes  Muscle aches: Yes  Neck pain: No  Swollen joints: Yes  Joint pain: Yes  Bone pain: Yes  Muscle cramps: Yes  Muscle weakness: Yes  Joint stiffness: Yes  Bone fracture: No  Anemia: Yes  Swollen glands: No  Easy bleeding or bruising: Yes  Trouble with coordination: Yes  Dizziness or trouble with balance: Yes  Fainting or black-out spells: No  Memory loss: No  Headache: Yes  Seizures: No  Speech problems: No  Tingling: No  Tremor: No  Weakness: Yes  Difficulty walking: No  Paralysis: No  Numbness: No  Bleeding or spotting between periods: No  Heavy or painful periods: No  Irregular periods: No  Vaginal discharge: Yes  Hot flashes: Yes  Vaginal dryness: No  Genital ulcers: No  Reduced libido: No  Painful intercourse: No  Difficulty with sexual arousal: No  Post-menopausal bleeding: Yes  Nervous or Anxious: Yes  Depression: Yes  Trouble sleeping: Yes  Trouble thinking or concentrating: No  Mood changes: No  Panic attacks: Yes

## 2017-04-04 NOTE — NURSING NOTE
Printed after visit summary given to pt along with verbal instructions.  Will check with daughter about days to schedule egd and colonoscopy. Needs two days of mahad.  Needs mac.  Follow up appt given. Sent to lab.

## 2017-04-04 NOTE — MR AVS SNAPSHOT
After Visit Summary   4/4/2017    Chantell Kidd    MRN: [id number]           Patient Information     Date Of Birth          1965        Visit Information        Provider Department      4/4/2017 8:30 AM Allie Pleitez RN [facility] Wound Ostomy        Today's Diagnoses     Leaking PEG tube (H)    -  1       Follow-ups after your visit        Your next 10 appointments already scheduled     Apr 20, 2017  8:00 AM CDT   (Arrive by 7:45 AM)   Return Auto Islet with Amena Maher MD   [facility] Solid Organ Transplant ([facility])    [address]  3rd Floor  [facility] [id number] [phone]            Jun 27, 2017   Procedure with Thomas Estrada MD   [facility] [place], Endoscopy ([facility])    [address] [id number] [phone]           The [facility] is located on the corner of [address] and [address] on the [facility]. It is easily accessible from virtually any point in the [place] area, via [address] and [address].            Oct 09, 2017  8:00 AM CDT   (Arrive by 7:45 AM)   Return Visit with Thomas Estrada MD   [facility] Gastroenterology and IBD ([facility])    [address] [place] [address]  4th [facility] [id number] [phone]              Who to contact     Please call your clinic at [phone] to:    Ask questions about your health    Make or cancel appointments    Discuss your medicines    Learn about your test results    Speak to your doctor   If you have compliments or concerns about an experience at your clinic, or if you wish to file a complaint, please contact [facility] Patient Relations at [phone] or email us at [email or web address]         Additional Information About Your Visit        MyChart Information     Janenet gives you secure access to your  electronic health record. If you see a primary care provider, you can also send messages to your care team and make appointments. If you have questions, please call your primary care clinic.  If you do not have a primary care provider, please call [phone] and they will assist you.      [employer] is an electronic gateway that provides easy, online access to your medical records. With [employer], you can request a clinic appointment, read your test results, renew a prescription or communicate with your care team.     To access your existing account, please contact your [facility] or call [phone] for assistance.        Care EveryWhere ID     This is your Care EveryWhere ID. This could be used by other organizations to access your [place] medical records  [id number]         Blood Pressure from Last 3 Encounters:   04/04/17 114/75   01/19/17 102/66   12/20/16 99/67    Weight from Last 3 Encounters:   04/04/17 61.4 kg (135 lb 4.8 oz)   01/19/17 63.7 kg (140 lb 6.4 oz)   01/19/17 63.7 kg (140 lb 6.4 oz)              Today, you had the following     No orders found for display       Primary Care Provider Office Phone # Fax #    Ron Morgan [phone]        PHYSICIANS [address]  [facility] [address]        Thank you!     Thank you for choosing [facility] WOUND OSTOMY  for your care. Our goal is always to provide you with excellent care. Hearing back from our patients is one way we can continue to improve our services. Please take a few minutes to complete the written survey that you may receive in the mail after your visit with us. Thank you!             Your Updated Medication List - Protect others around you: Learn how to safely use, store and throw away your medicines at www.disposemymeds.org.          This list is accurate as of: 4/4/17 11:59 PM.  Always use your most recent med list.                   Brand Name Dispense Instructions  for use    acetaminophen 500 MG Caps      Take 1,000 mg by mouth three times a day as needed.       alendronate 70 MG tablet    FOSAMAX    4 tablet    Take 1 tablet (70 mg) by mouth every 7 days On Sundays take first thing in the morning with plain water and remain upright for at least 30 minutes and until after first food of the day  Do not restart Fosamax (alendronate) until your difficulty swallowing has resolved and you have finished the entire course of fluconazole (Diflucan).       alum & mag hydroxide-simethicone 200-200-25 MG Chew chewable tablet    GELUSIL    100 tablet    CHEW AND SWALLOW TWO CHEWS BY MOUTH EVERY 4 HOURS AS NEEDED FOR INDIGESTION       amylase-lipase-protease 40586 UNITS Cpep    CREON 12    2160 capsule    Take 6 capsules with meals and 3 with snacks.  This is up to 24 capsules per day.       aspirin 81 MG tablet      Take 81 mg by mouth daily.       blood glucose monitoring lancets     102 each    by Lancet route. Use to test blood sugar daily or as directed.       blood glucose monitoring test strip    no brand specified    240 each    Use to test blood glucoses 6-8 times per day.       * BOOST HIGH PROTEIN Liqd      After above baseline labs are drawn, give: 6 mL/kg to maximum of 360 mL; the beverage is to be consumed within 5 minutes.       * BOOST HIGH PROTEIN Liqd      Also can use Ensure clear (available over the counter)       * BOOST HIGH PROTEIN Liqd      After above baseline labs are drawn, give: 6 mL/kg to maximum of 360 mL; the beverage is to be consumed within 5 minutes.       cyclobenzaprine 5 MG tablet    FLEXERIL    42 tablet    Take 1 tablet (5 mg) by mouth 3 times daily as needed for muscle spasms       diclofenac 1 % Gel topical gel    VOLTAREN     2 g Apply 2 g to skin four times a day as needed (to affected upper extremity joint(s)). Maximum 8g/day per joint, 16g/day total.       dicyclomine 10 MG capsule    BENTYL    40 capsule    TAKE ONE CAPSULE BY MOUTH EVERY 6  HOURS AS NEEDED       docusate sodium 100 MG capsule    DOCQLACE    120 capsule    Take 1 capsule (100 mg) by mouth daily as needed for constipation       dronabinol 2.5 MG capsule    MARINOL    56 capsule    Take 2 capsules (5 mg) by mouth 2 times daily as needed       * DULoxetine 30 MG EC capsule    CYMBALTA    90 capsule    Take 1 capsule (30 mg) by mouth daily With 60mg capsule for total dose of 90mg       * DULoxetine 60 MG EC capsule    CYMBALTA    90 capsule    Take 1 capsule (60 mg) by mouth daily       furosemide 20 MG tablet    LASIX    180 tablet    Take 1 tablet (20 mg) by mouth 2 times daily       hydrocortisone 10 MG tablet    CORTEF    60 tablet    Take 10 mg in the morning and 10 mg at bedtime. Watch for hypoglycemia recurrence.       insulin aspart 100 UNITS/ML injection    NovoLOG VIAL    36 vial    As directed in pump       insulin pen needle 31G X 5 MM     2 Box    RX# [id number]  Pen Needle UC 31G UF IV Mini  Use 4-8 needles per day for insulin injections.       levothyroxine 112 MCG tablet    SYNTHROID/LEVOTHROID    90 tablet    Take 1 tablet (112 mcg) by mouth daily       linaclotide 145 MCG capsule    LINZESS    30 capsule    Take 1 capsule (145 mcg) by mouth every morning (before breakfast)       metoclopramide 5 MG tablet    REGLAN    100 tablet    Take 2 tablets (10 mg) by mouth 3 times daily (before meals)       morphine 0.1% in solosite topical gel     25 g    Place 1 g onto the skin 4 times daily as needed for pain       nystatin 20094 unit/0.5mL Susp suspension    MYCOSTATIN    60 mL    Take 1 mL (100,000 Units) by mouth 4 times daily       ondansetron 4 MG ODT tab    ZOFRAN-ODT    60 tablet    DISSOLVE ONE TABLET ON THE TONGUE EVERY 6 HOURS AS NEEDED FOR NAUSEA       oxyCODONE 5 MG IR tablet    ROXICODONE    50 tablet    Take 1 tablet (5 mg) by mouth every 6 hours as needed       pantoprazole 40 MG EC tablet    PROTONIX    180 tablet    Take 1 tablet (40 mg) by mouth 2 times daily        polyethylene glycol Packet    MIRALAX/GLYCOLAX    14 each    Take 1 packet by mouth 2 times daily as needed for constipation       potassium chloride SA 20 MEQ CR tablet    K-DUR/KLOR-CON M    100 tablet    Take 1 tablet (20 mEq) by mouth daily       pregabalin 150 MG capsule    LYRICA    90 capsule    Take 1 capsule (150 mg) by mouth 3 times daily       senna 8.6 MG tablet    SENOKOT    30 tablet    Take 1-2 tablets by mouth daily as needed for constipation       sodium bicarbonate 325 MG tablet     270 tablet    1 tablet (325 mg) by Per Feeding Tube route every 4 hours       SUMAtriptan 50 MG tablet    IMITREX    30 tablet    Take 1 tablet (50 mg) by mouth at onset of headache for migraine Take 1 Tab by mouth Once as needed for Migraine Headache. May repeat after two hours.  Maximum dose 200 mg/24 hours.       topiramate 100 MG tablet    TOPAMAX    180 tablet    Take 1 tablet (100 mg) by mouth 2 times daily       traZODone 100 MG tablet    DESYREL    100 tablet    Take 1.5 tablets (150 mg) by mouth At Bedtime Take one to two tablets by mouth an hour before bedtime       * Notice:  This list has 5 medication(s) that are the same as other medications prescribed for you. Read the directions carefully, and ask your doctor or other care provider to review them with you.

## 2017-04-04 NOTE — MR AVS SNAPSHOT
After Visit Summary   4/4/2017    Chantell Kidd    MRN: [id number]           Patient Information     Date Of Birth          1965        Visit Information        Provider Department      4/4/2017 8:00 AM Thomas Estrada MD [facility] Gastroenterology and IBD        Care Instructions    1. I am glad your nausea is better!      2. Your tube looks good for the most part. Please follow up with wound care.      3. Schedule upper endoscopy and colonoscopy. You will need an extra day of prep of golytely.      4. Start taking a capful of miralax a day. If not having a BM every day can increase up to 2-3 capfuls daily. Find the dose that helps you have 1-2 BMs daily      5. Start fiber supplement (Citrucel) 1 tsp daily in a glass of water. Increase to 2 tsp after 1 week and then 1 tablespoon every day thereafter      6. Check CBC today  Lab tests today at the 1st floor -- you will be notified of results by letter or my chart message in 7-10 days.  You will receive a phone call if more urgent follow up is needed.     7. For questions regarding your care Monday through Friday, contact the RN GI care coordinator,  Call Shiloh Jean at  [phone] option 3 and leave a voicemail. Your call will be  returned same day, or if consultation is needed with the provider, it may be following business day - or you may send a My Chart message.    For medication refills (prescribed by the GI clinic), contact your pharmacy.    For appointment rescheduling/cancellation, contact [phone]     After hours, or if you have an immediate GI concern and cannot wait for a return call, contact the GI Fellow at [phone] and select option #4.        Follow up 6 months or sooner if needed    October 9   Check in 745  [facility] 4th floor  Dr. Thomas Estrada         Follow-ups after your visit        Your next 10 appointments already scheduled     Apr 20, 2017  8:00 AM CDT   (Arrive by 7:45 AM)   Return Auto Islet with  Amena Maher MD   [facility] Solid Organ Transplant ([facility])    [address]  3rd Floor  [facility] [facility] [id number] [phone]            Oct 09, 2017  8:00 AM CDT   (Arrive by 7:45 AM)   Return Visit with Thomas Estrada MD   [facility] Gastroenterology and IBD ([facility])    [address]  4th Floor  [facility] [facility] [address] [phone]              Who to contact     Please call your clinic at [phone] to:    Ask questions about your health    Make or cancel appointments    Discuss your medicines    Learn about your test results    Speak to your doctor   If you have compliments or concerns about an experience at your clinic, or if you wish to file a complaint, please contact [facility] Patient Relations at [phone] or email us at [email or web address]         Additional Information About Your Visit        [employer] Information     [employer] gives you secure access to your electronic health record. If you see a primary care provider, you can also send messages to your care team and make appointments. If you have questions, please call your primary care clinic.  If you do not have a primary care provider, please call [phone] and they will assist you.      [employer] is an electronic gateway that provides easy, online access to your medical records. With [employer], you can request a clinic appointment, read your test results, renew a prescription or communicate with your care team.     To access your existing account, please contact your [facility] or call [phone] for assistance.        Care EveryWhere ID     This is your Care EveryWhere ID. This could be used by other organizations to access your [place] medical records  [id number]        Your Vitals Were     Pulse Temperature Height Pulse Oximetry BMI (Body Mass Index)       78 98.2  F (36.8  C) 1.575  "m (5' 2\") 98% 24.75 kg/m2        Blood Pressure from Last 3 Encounters:   04/04/17 114/75   01/19/17 102/66   12/20/16 99/67    Weight from Last 3 Encounters:   04/04/17 61.4 kg (135 lb 4.8 oz)   01/19/17 63.7 kg (140 lb 6.4 oz)   01/19/17 63.7 kg (140 lb 6.4 oz)              Today, you had the following     No orders found for display       Primary Care Provider Office Phone # Fax #    Ron Morgan [phone]        PHYSICIANS [address]  [facility] [address]        Thank you!     Thank you for choosing [facility] GASTROENTEROLOGY AND IBD  for your care. Our goal is always to provide you with excellent care. Hearing back from our patients is one way we can continue to improve our services. Please take a few minutes to complete the written survey that you may receive in the mail after your visit with us. Thank you!             Your Updated Medication List - Protect others around you: Learn how to safely use, store and throw away your medicines at www.disposemymeds.org.          This list is accurate as of: 4/4/17  8:47 AM.  Always use your most recent med list.                   Brand Name Dispense Instructions for use    acetaminophen 500 MG Caps      Take 1,000 mg by mouth three times a day as needed.       alendronate 70 MG tablet    FOSAMAX    4 tablet    Take 1 tablet (70 mg) by mouth every 7 days On Sundays take first thing in the morning with plain water and remain upright for at least 30 minutes and until after first food of the day  Do not restart Fosamax (alendronate) until your difficulty swallowing has resolved and you have finished the entire course of fluconazole (Diflucan).       alum & mag hydroxide-simethicone 200-200-25 MG Chew chewable tablet    GELUSIL    100 tablet    CHEW AND SWALLOW TWO CHEWS BY MOUTH EVERY 4 HOURS AS NEEDED FOR INDIGESTION       amylase-lipase-protease 59755 UNITS Cpep    CREON 12    2160 capsule    Take 6 capsules with meals and 3 " with snacks.  This is up to 24 capsules per day.       aspirin 81 MG tablet      Take 81 mg by mouth daily.       blood glucose monitoring lancets     102 each    by Lancet route. Use to test blood sugar daily or as directed.       blood glucose monitoring test strip    no brand specified    240 each    Use to test blood glucoses 6-8 times per day.       * BOOST HIGH PROTEIN Liqd      After above baseline labs are drawn, give: 6 mL/kg to maximum of 360 mL; the beverage is to be consumed within 5 minutes.       * BOOST HIGH PROTEIN Liqd      Also can use Ensure clear (available over the counter)       * BOOST HIGH PROTEIN Liqd      After above baseline labs are drawn, give: 6 mL/kg to maximum of 360 mL; the beverage is to be consumed within 5 minutes.       cyclobenzaprine 5 MG tablet    FLEXERIL    42 tablet    Take 1 tablet (5 mg) by mouth 3 times daily as needed for muscle spasms       diclofenac 1 % Gel topical gel    VOLTAREN     2 g Apply 2 g to skin four times a day as needed (to affected upper extremity joint(s)). Maximum 8g/day per joint, 16g/day total.       dicyclomine 10 MG capsule    BENTYL    40 capsule    TAKE ONE CAPSULE BY MOUTH EVERY 6 HOURS AS NEEDED       docusate sodium 100 MG capsule    DOCQLACE    120 capsule    Take 1 capsule (100 mg) by mouth daily as needed for constipation       dronabinol 2.5 MG capsule    MARINOL    56 capsule    Take 2 capsules (5 mg) by mouth 2 times daily as needed       * DULoxetine 30 MG EC capsule    CYMBALTA    90 capsule    Take 1 capsule (30 mg) by mouth daily With 60mg capsule for total dose of 90mg       * DULoxetine 60 MG EC capsule    CYMBALTA    90 capsule    Take 1 capsule (60 mg) by mouth daily       furosemide 20 MG tablet    LASIX    180 tablet    Take 1 tablet (20 mg) by mouth 2 times daily       hydrocortisone 10 MG tablet    CORTEF    60 tablet    Take 10 mg in the morning and 10 mg at bedtime. Watch for hypoglycemia recurrence.       insulin aspart  100 UNITS/ML injection    NovoLOG VIAL    36 vial    As directed in pump       insulin pen needle 31G X 5 MM     2 Box    RX# [id number]  Pen Needle UC 31G UF IV Mini  Use 4-8 needles per day for insulin injections.       levothyroxine 112 MCG tablet    SYNTHROID/LEVOTHROID    90 tablet    Take 1 tablet (112 mcg) by mouth daily       linaclotide 145 MCG capsule    LINZESS    30 capsule    Take 1 capsule (145 mcg) by mouth every morning (before breakfast)       metoclopramide 5 MG tablet    REGLAN    100 tablet    Take 2 tablets (10 mg) by mouth 3 times daily (before meals)       morphine 0.1% in solosite topical gel     25 g    Place 1 g onto the skin 4 times daily as needed for pain       nystatin 08857 unit/0.5mL Susp suspension    MYCOSTATIN    60 mL    Take 1 mL (100,000 Units) by mouth 4 times daily       ondansetron 4 MG ODT tab    ZOFRAN-ODT    60 tablet    DISSOLVE ONE TABLET ON THE TONGUE EVERY 6 HOURS AS NEEDED FOR NAUSEA       oxyCODONE 5 MG IR tablet    ROXICODONE    50 tablet    Take 1 tablet (5 mg) by mouth every 6 hours as needed       pantoprazole 40 MG EC tablet    PROTONIX    180 tablet    Take 1 tablet (40 mg) by mouth 2 times daily       polyethylene glycol Packet    MIRALAX/GLYCOLAX    14 each    Take 1 packet by mouth 2 times daily as needed for constipation       potassium chloride SA 20 MEQ CR tablet    K-DUR/KLOR-CON M    100 tablet    Take 1 tablet (20 mEq) by mouth daily       pregabalin 150 MG capsule    LYRICA    90 capsule    Take 1 capsule (150 mg) by mouth 3 times daily       senna 8.6 MG tablet    SENOKOT    30 tablet    Take 1-2 tablets by mouth daily as needed for constipation       sodium bicarbonate 325 MG tablet     270 tablet    1 tablet (325 mg) by Per Feeding Tube route every 4 hours       SUMAtriptan 50 MG tablet    IMITREX    30 tablet    Take 1 tablet (50 mg) by mouth at onset of headache for migraine Take 1 Tab by mouth Once as needed for Migraine Headache. May repeat after  two hours.  Maximum dose 200 mg/24 hours.       topiramate 100 MG tablet    TOPAMAX    180 tablet    Take 1 tablet (100 mg) by mouth 2 times daily       traZODone 100 MG tablet    DESYREL    100 tablet    Take 1.5 tablets (150 mg) by mouth At Bedtime Take one to two tablets by mouth an hour before bedtime       * Notice:  This list has 5 medication(s) that are the same as other medications prescribed for you. Read the directions carefully, and ask your doctor or other care provider to review them with you.

## 2017-04-04 NOTE — LETTER
4/4/2017       RE: Chantell Kidd  [address]  [facility] [address]     Dear Colleague,    Thank you for referring your patient, Chantell Kidd, to the [facility] GASTROENTEROLOGY AND IBD at [facility]. Please see a copy of my visit note below.    GI CLINIC VISIT - NEW PATIENT     CC/REFERRING MD: Dr. Morgan     REASON FOR CONSULTATION: iron deficiency anemia, chronic abdominal pain     HPI: 49 year old female with a complicated PMH including idiopathic chronic pancreatitis s/p EUS and multiple ERCPs with eventual total pancreatectomy with auto-islet cell transplant in 1/2012. She has had continued issues with chronic abdominal pain, nausea/vomiting and also iron deficiency anemia. She is here today to follow up. Please see my previous notes for full details of her work up. She has had CTs, MRCPs, US abd, EGDs and colonoscopies.    Since our last visit she had hypoglycemia and weight loss and she had a PEG tube placed. This was later changed to a GJ tube. She did have some pain at the site but this has improved. Overall doing well with the tube and feeds. She has gained weight. She does note that if she drinks liquid right before showering she will have some leaking from around the tube while taking a shower. Her nausea is well controlled now.    She does note over the last month she has had black stools. Soft. She is taking iron in vitamin pill but no other iron and no peptobismol. Also has some red blood in stool at times. Mixed in stool and also on toilet paper. Her colonoscopy was limited by poor prep last year.    She has also noted recurrent oral thrush in her mouth over the last year. She takes nystatin swish and swallow which resolves symptoms. Sometimes does have some pain with swallowing with this but none now. EGD in 8/2016 was normal. EGD in 7/2016 did show some candida esophagitis.    She is off all narcotics!    ROS: 10pt ROS performed and otherwise  negative.    PERTINENT PAST MEDICAL HISTORY:  1. Chronic abdominal pain with a history of idiopathic chronic pancreatitis, status post total pancreatectomy and auto islet cell transplant. She did have a barrier stent that was removed via ERCP later that year in 2012.   2. History of appendicitis with ruptured appendix in mid 2012, status post appendectomy.   3. She has relatively brittle diabetes, status post her total pancreatectomy and auto islet cell transplant.   4. History of migraines for which she takes the Tylenol.   5. General anxiety disorder.   6. Major depressive disorder.   7. GERD for which she takes a PPI.   8. Chronic back pain  9. Oral and esophageal candidiasis     PREVIOUS ABDOMINAL/GYNECOLOGIC SURGERIES:  1. Total pancreatectomy and auto islet cell transplant as above.   2. Status post cholecystectomy about 5 years ago.   3. Status post appendectomy in mid 2012.      PREVIOUS ENDOSCOPY:  As reviewed above     PERTINENT MEDICATIONS:  Medications reviewed with patient today, see Medication List/Assessment for details.     Current Outpatient Prescriptions   Medication     amylase-lipase-protease (CREON 12) 47954 UNITS CPEP     alum & mag hydroxide-simethicone (GELUSIL) 200-200-25 MG CHEW chewable tablet     dronabinol (MARINOL) 2.5 MG capsule     linaclotide (LINZESS) 145 MCG capsule     furosemide (LASIX) 20 MG tablet     pregabalin (LYRICA) 150 MG capsule     sodium bicarbonate 325 MG tablet     cyclobenzaprine (FLEXERIL) 5 MG tablet     levothyroxine (SYNTHROID/LEVOTHROID) 112 MCG tablet     ondansetron (ZOFRAN-ODT) 4 MG ODT tab     Nutritional Supplements (BOOST HIGH PROTEIN) LIQD     traZODone (DESYREL) 100 MG tablet     DULoxetine (CYMBALTA) 30 MG EC capsule     DULoxetine (CYMBALTA) 60 MG EC capsule     morphine 0.1% in solosite     nystatin (MYCOSTATIN) 07564 unit/0.5mL SUSP     senna (SENOKOT) 8.6 MG tablet     oxyCODONE (ROXICODONE) 5 MG immediate release tablet     hydrocortisone (CORTEF)  "10 MG tablet     docusate sodium (DOCQLACE) 100 MG capsule     pantoprazole (PROTONIX) 40 MG enteric coated tablet     dicyclomine (BENTYL) 10 MG capsule     topiramate (TOPAMAX) 100 MG tablet     potassium chloride SA (K-DUR,KLOR-CON M) 20 MEQ tablet     alendronate (FOSAMAX) 70 MG tablet     blood glucose monitoring (NO BRAND SPECIFIED) test strip     insulin aspart (NOVOLOG VIAL) 100 UNITS/ML VIAL     Nutritional Supplements (BOOST HIGH PROTEIN) LIQD     SUMAtriptan (IMITREX) 50 MG tablet     metoclopramide (REGLAN) 5 MG tablet     Nutritional Supplements (BOOST HIGH PROTEIN) LIQD     acetaminophen 500 MG CAPS     diclofenac (VOLTAREN) 1 % GEL     aspirin 81 MG tablet     insulin pen needle needle     polyethylene glycol (MIRALAX/GLYCOLAX) packet     ACCU-CHEK MULTICLIX LANCETS MISC     No current facility-administered medications for this visit.        No other NSAID/anticoagulation reported by patient.  No other OTC/herbal/supplements reported by patient.     SOCIAL HISTORY:  On disability. Denies tobacco or marijuana. Denies IV drug use. Denies alcohol.     FAMILY HISTORY:  Father with a history of pancreatic cancer. She believes one sister of hers  of liver cancer at the age of 21. She does not know any details of whether she had any other liver disease. She also has a sister with breast cancer.      PHYSICAL EXAMINATION:  Vitals reviewed, AFVSS  /75 (BP Location: Left arm, Patient Position: Chair, Cuff Size: Adult Regular)  Pulse 78  Temp 98.2  F (36.8  C)  Ht 1.575 m (5' 2\")  Wt 61.4 kg (135 lb 4.8 oz)  SpO2 98%  BMI 24.75 kg/m2  Gen: aaox3, cooperative, pleasant, not dyspneic/diaphoretic, nad  HEENT: ncat, neck supple, no clad/sclad, normal op w/o ulcer/exudate, anicteric, mmm  Resp/CV unremarkable  Abd: Soft, non-distended, overweight, tender to palpation in RUQ; no r/g  Ext: no c/c/e  Back: no point tenderness over vertebrae, no CVA tenderness b/l; tenderness in right lumbar paraspinal " muscles  Skin: warm, perfused, no jaundice; GJ tube healthy - some extra granulation tissue  Neuro: grossly intact, no asterixis noted     PERTINENT STUDIES:  Reviewed in epic  Labs reviewed. Ferritin single digits. Hgb 9-11.    TSH 1.52  ESR 20-38  CRP < 2.9  INR WNL     ERLINDA - neg  AMA - neg  f-actin - neg  Ceruloplasmin WNL  Alpha-1-antitrypsin neg     MRCP:   IMPRESSION:   1. Postoperative changes of total pancreatectomy, splenectomy,  cholecystectomy and hepaticojejunostomy. No intrahepatic or  extrahepatic biliary dilatation.  2. Extensive hepatic steatosis with patchy peripheral geographic areas  of focal fatty sparing.     US 3/15  Liver: The liver measures 16 cm demonstrates increased echogenicity  and heterogeneous echotexture. The portal triads are poorly  visualized. No evidence of a focal hepatic mass. The main portal vein  is patent measuring 1.2 cm with antegrade flow measuring 44 cm/s.  Gallbladder: Surgically absent.  Bile Ducts: Both the intra- and extrahepatic biliary system are of  normal caliber. The common bile duct measures 5.6 mm in diameter.  Pancreas: Surgically Absent   Kidneys: Both kidneys are of normal echotexture, without mass or  hydronephrosis. The craniocaudal dimensions are: right- 9.9 cm,  left- 9.9 cm.  Spleen: Surgically absent.  Aorta and IVC: The visualized portions of the IVC is unremarkable .  The IVC measures 1.7 cm in diameter. The proximal aorta was not  visualized  Fluid: No evidence of ascites or pleural effusions.     US with doppler 9/2014  FINDINGS: The liver measures 13.5 cm in length and has diffusely  mildly increased echogenicity. There is no evidence for intrahepatic  mass or biliary dilatation. There is pneumobilia which is stable  compared to prior CT dated 5/29/2014 and is related to postoperative  reconnection of biliary system. The gallbladder is surgically absent..  . The pancreas is surgically absent. The spleen is surgically  absent. The aorta and  inferior vena cava are visualized. The right  kidney measures 11.5 x 5.0 x 5.5 cm and the left kidney measures 11.4  x 4.3 x 4.7 cm. There is normal echogenicity with no evidence for  hydronephrosis, mass or stone in either kidney.  Abdominal Doppler:  The hepatic vascular system is patent and has appropriate antegrade  flow. The flow velocities are as follows:  Main hepatic artery 120 cm/s with a resistive index of 0.75  Splenic vein is surgically absent  Main portal vein 37 cm/s  Right portal vein 25 cm/s  Left portal vein 11 cm/s  The middle, left, and right hepatic veins are patent.  IMPRESSION:   1. Hepatic steatosis.  2. Stable pneumobilia related to prior surgical changes.  3. Status post auto islet cell transplant, pancreatectomy,  cholecystectomy, and splenectomy.  4. Normal hepatic Dopplers.     CT abd/pelvis 5/2014  IMPRESSION  Impression: Postsurgical changes of Venessa-en-Y gastric bypass,  pancreatectomy, and cholecystectomy. No small bowel obstruction or  other explanation for patient's abdominal pain.    GES: could not eat enough eggs.     ASSESSMENT/PLAN:  49 year old female with a complicated PMH including idiopathic chronic pancreatitis s/p EUS and multiple ERCPs with eventual total pancreatectomy with auto-islet cell transplant in 1/2012. She is here to follow up for multiple issuss.     1. Chronic pain syndrome  -congratulated Chantell on being off narcotics. I think this is likely why nausea is better (and the GJ tube)    2. Constipation  -increase miralax to 1 capful daily. Can titrate up to 3 times daily as needed  -add citrucel  -ok to continue Linzess   -hopefully can taper off senna    3. Granulation tissue at GJ - wound care is seeing pt to use silver nitrate on granulation tissue today    4. Melena and BRBPR  -check CBC  -schedule EGD and colonoscopy. BRBPR sounds mainly like hemorrhoids but will ensure nothing else on colonoscopy. EGD for melena     RTC in 6 months or sooner if  needed     Thank you for this consultation. It was a pleasure to participate in the care of this patient; please contact us with any further questions.      Thomas Estrada MD    [facility]   Department of Gastroenterology, Hepatology and Nutrition

## 2017-04-04 NOTE — PATIENT INSTRUCTIONS
1. I am glad your nausea is better!      2. Your tube looks good for the most part. Please follow up with wound care.      3. Schedule upper endoscopy and colonoscopy. You will need an extra day of prep of golytely.      4. Start taking a capful of miralax a day. If not having a BM every day can increase up to 2-3 capfuls daily. Find the dose that helps you have 1-2 BMs daily      5. Start fiber supplement (Citrucel) 1 tsp daily in a glass of water. Increase to 2 tsp after 1 week and then 1 tablespoon every day thereafter      6. Check CBC today  Lab tests today at the 1st floor -- you will be notified of results by letter or my chart message in 7-10 days.  You will receive a phone call if more urgent follow up is needed.     7. For questions regarding your care Monday through Friday, contact the RN GI care coordinator,  Call Shiloh Jean at  [phone] option 3 and leave a voicemail. Your call will be  returned same day, or if consultation is needed with the provider, it may be following business day - or you may send a My Chart message.    For medication refills (prescribed by the GI clinic), contact your pharmacy.    For appointment rescheduling/cancellation, contact [phone]     After hours, or if you have an immediate GI concern and cannot wait for a return call, contact the GI Fellow at [phone] and select option #4.        Follow up 6 months or sooner if needed    October 9   Check in 745  Sur[facility] Clinic 4th floor  Dr. Thomas Estrada

## 2017-04-05 ENCOUNTER — HOSPITAL ENCOUNTER (OUTPATIENT)
Facility: CLINIC | Age: 52
End: 2017-04-05
Attending: INTERNAL MEDICINE | Admitting: INTERNAL MEDICINE

## 2017-04-05 NOTE — PROGRESS NOTES
Pt presents to clinic for pain related to Percutaneous gastric tube (PEG)- new placement  Tube was placed on 11/30/16 and is intact.  Flange is not positioned with the appropriate tightness and pt has a piece of gauze underneath the flange  Her tube is not fastened with tape to her body.    She is c/o pain at the tube site. Her skin does present with some hypergranulation and around the tube site. This was treated with Silver nitrate treatment as indicated.Pt can expect some gray discolored drainage and reiterated to keep small amount of gauze underneath the flange and keep tube attached to the body at all times to prevent it from swinging around.     Pt instructed to measure the length of the feeding tube to make sure it stays in the approximate position at level #4. Follow up as needed. Dr. Estrada was available for supervision of care if needed or if questions should arise and regarding plan of care.  Allie Pleitez RN CWON

## 2017-04-12 DIAGNOSIS — Z00.00 ROUTINE HEALTH MAINTENANCE: ICD-10-CM

## 2017-04-14 NOTE — TELEPHONE ENCOUNTER
metoclopramide (REGLAN) 5 MG      Last Written Prescription Date:  3/22/16  Last Fill Quantity: 100,   # refills: 6  Last Office Visit : 12/20/16  Future Office visit:  NONE  Routing refill request to provider for review/approval because:  Drug not on  refill protocol

## 2017-04-17 RX ORDER — METOCLOPRAMIDE 5 MG/1
TABLET ORAL
Qty: 100 TABLET | Refills: 6 | Status: ON HOLD | OUTPATIENT
Start: 2017-04-17 | End: 2018-08-27

## 2017-04-25 DIAGNOSIS — E89.1 POST-PANCREATECTOMY DIABETES (H): Primary | ICD-10-CM

## 2017-04-25 DIAGNOSIS — E13.9 POST-PANCREATECTOMY DIABETES (H): Primary | ICD-10-CM

## 2017-04-25 DIAGNOSIS — Z90.410 POST-PANCREATECTOMY DIABETES (H): Primary | ICD-10-CM

## 2017-04-26 ENCOUNTER — TELEPHONE (OUTPATIENT)
Dept: TRANSPLANT | Facility: CLINIC | Age: 52
End: 2017-04-26

## 2017-04-26 NOTE — TELEPHONE ENCOUNTER
Left VMM and contact information for Chantell to return my call. Needs to reschedule her Endocrinology follow up.

## 2017-05-10 DIAGNOSIS — R11.0 NAUSEA: ICD-10-CM

## 2017-05-10 DIAGNOSIS — R10.13 ABDOMINAL PAIN, EPIGASTRIC: ICD-10-CM

## 2017-05-15 DIAGNOSIS — R10.13 ABDOMINAL PAIN, EPIGASTRIC: ICD-10-CM

## 2017-05-15 RX ORDER — DICYCLOMINE HYDROCHLORIDE 10 MG/1
CAPSULE ORAL
Qty: 40 CAPSULE | Refills: 3 | Status: CANCELLED | OUTPATIENT
Start: 2017-05-15

## 2017-05-15 RX ORDER — DICYCLOMINE HYDROCHLORIDE 10 MG/1
CAPSULE ORAL
Qty: 40 CAPSULE | Refills: 3 | Status: SHIPPED | OUTPATIENT
Start: 2017-05-15 | End: 2019-01-16

## 2017-05-15 RX ORDER — ONDANSETRON 4 MG/1
TABLET, ORALLY DISINTEGRATING ORAL
Qty: 60 TABLET | Refills: 1 | Status: SHIPPED | OUTPATIENT
Start: 2017-05-15 | End: 2018-04-07

## 2017-05-15 NOTE — TELEPHONE ENCOUNTER
ondansetron      Last Written Prescription Date:  1/4/17  Last Fill Quantity: 60,   # refills: 1  Last Office Visit : 12/20/16  Future Office visit:  none

## 2017-05-17 DIAGNOSIS — Z94.9 CELL TRANSPLANT: ICD-10-CM

## 2017-05-17 DIAGNOSIS — E46 MALNUTRITION (H): Primary | ICD-10-CM

## 2017-05-17 RX ORDER — SODIUM BICARBONATE 650 MG/1
TABLET ORAL
Qty: 135 TABLET | Refills: 3 | Status: ON HOLD | OUTPATIENT
Start: 2017-05-17 | End: 2018-08-27

## 2017-05-18 DIAGNOSIS — F51.01 PRIMARY INSOMNIA: ICD-10-CM

## 2017-05-18 DIAGNOSIS — M54.9 CHRONIC BACK PAIN, UNSPECIFIED BACK LOCATION, UNSPECIFIED BACK PAIN LATERALITY: ICD-10-CM

## 2017-05-18 DIAGNOSIS — G89.29 CHRONIC BACK PAIN, UNSPECIFIED BACK LOCATION, UNSPECIFIED BACK PAIN LATERALITY: ICD-10-CM

## 2017-05-18 DIAGNOSIS — K59.00 CONSTIPATION, UNSPECIFIED CONSTIPATION TYPE: ICD-10-CM

## 2017-05-18 DIAGNOSIS — R53.81 PHYSICAL DECONDITIONING: ICD-10-CM

## 2017-05-22 NOTE — TELEPHONE ENCOUNTER
linaclotide (LINZESS) 145 MCG      Last Written Prescription Date:  3/7/17  Last Fill Quantity: 30,   # refills: 1  Last Office Visit : 12/20/16  Future Office visit:  NONE    Routing refill request to provider for review/approval because:  Drug not on the [facility], [facility] or [facility] refill protocol or controlled substance

## 2017-06-08 ENCOUNTER — NURSE TRIAGE (OUTPATIENT)
Dept: NURSING | Facility: CLINIC | Age: 52
End: 2017-06-08

## 2017-06-09 ENCOUNTER — NURSE TRIAGE (OUTPATIENT)
Dept: NURSING | Facility: CLINIC | Age: 52
End: 2017-06-09

## 2017-06-09 ENCOUNTER — CARE COORDINATION (OUTPATIENT)
Dept: SURGERY | Facility: CLINIC | Age: 52
End: 2017-06-09

## 2017-06-09 NOTE — TELEPHONE ENCOUNTER
"\"My feeding tube has a crack or hole in it.\"  Patient reporting symptoms started 6/8/17 with feeding tube leaking at base. Patient has stopped tube feeding and applied gauze around site.  "

## 2017-06-09 NOTE — TELEPHONE ENCOUNTER
"  Reason for Disposition    [1] Tube is broken or cracked AND [2] is not usable    Additional Information    Negative: Shock suspected (e.g., cold/pale/clammy skin, too weak to stand, low BP, rapid pulse)    Negative: [1] Shortness of breath AND [2] new onset    Negative: Bluish lips, tongue, or face now    Negative: Sounds like a life-threatening emergency to the triager    Negative: SEVERE abdominal pain    Negative: [1] Vomiting AND [2] contains red blood or black (\"coffee ground\") material  (Exception: few red streaks in vomit that only happened once)    Negative: Tube contains red blood or black (\"coffee ground\") material (Exception: pink tinge of tube fluid occurs once and clears immediately with irrigation.)    Negative: Gastrostomy feeding tube (e.g., PEG tube) came out    Negative: Vomiting > 4 times in the past 4 hours    Negative: Diarrhea > 4 times in the past 4 hours    Negative: Dehydration suspected (e.g., no urine > 12 hours, very dry mouth, lightheaded)    Negative: [1] Tube is clogged AND [2] irrigation has been attempted without success    Protocols used: FEEDING TUBE SYMPTOMS AND QUESTIONS-ADULT-    "

## 2017-06-09 NOTE — TELEPHONE ENCOUNTER
"Hole in G-tube. Formula leaking out. Monisha Summers RN/FNA    Reason for Disposition    [1] Tube is broken or cracked AND [2] is not usable    Additional Information    Negative: Shock suspected (e.g., cold/pale/clammy skin, too weak to stand, low BP, rapid pulse)    Negative: [1] Shortness of breath AND [2] new onset    Negative: Bluish lips, tongue, or face now    Negative: Sounds like a life-threatening emergency to the triager    Negative: SEVERE abdominal pain    Negative: [1] Vomiting AND [2] contains red blood or black (\"coffee ground\") material  (Exception: few red streaks in vomit that only happened once)    Negative: Tube contains red blood or black (\"coffee ground\") material (Exception: pink tinge of tube fluid occurs once and clears immediately with irrigation.)    Negative: Gastrostomy feeding tube (e.g., PEG tube) came out    Negative: Vomiting > 4 times in the past 4 hours    Negative: Diarrhea > 4 times in the past 4 hours    Negative: Dehydration suspected (e.g., no urine > 12 hours, very dry mouth, lightheaded)    Negative: [1] Tube is clogged AND [2] irrigation has been attempted without success    Protocols used: FEEDING TUBE SYMPTOMS AND QUESTIONS-ADULT-    "

## 2017-06-09 NOTE — PROGRESS NOTES
Chantell Kidd is a patient that was under the care of Dr. Rodriguez in October 2016 and a g-tube was placed (10/24/16).  The patient is calling the clinic at this time stating that her G-tube started leaking last night and she notes a crack in the tube.  Until last night, the tube was functioning well.  Discussed with Dr. Tran and he will see the patient in the clinic today and try and exchange the tube.

## 2017-06-10 ENCOUNTER — TRANSFERRED RECORDS (OUTPATIENT)
Dept: HEALTH INFORMATION MANAGEMENT | Facility: CLINIC | Age: 52
End: 2017-06-10

## 2017-06-10 ENCOUNTER — HOSPITAL ENCOUNTER (EMERGENCY)
Facility: CLINIC | Age: 52
Discharge: HOME | End: 2017-06-10
Attending: EMERGENCY MEDICINE | Admitting: EMERGENCY MEDICINE
Payer: MEDICARE

## 2017-06-10 ENCOUNTER — NURSE TRIAGE (OUTPATIENT)
Dept: NURSING | Facility: CLINIC | Age: 52
End: 2017-06-10

## 2017-06-10 ENCOUNTER — APPOINTMENT (OUTPATIENT)
Dept: INTERVENTIONAL RADIOLOGY/VASCULAR | Facility: CLINIC | Age: 52
End: 2017-06-10
Attending: EMERGENCY MEDICINE
Payer: MEDICARE

## 2017-06-10 VITALS
DIASTOLIC BLOOD PRESSURE: 64 MMHG | TEMPERATURE: 98.9 F | BODY MASS INDEX: 24.29 KG/M2 | RESPIRATION RATE: 18 BRPM | WEIGHT: 132 LBS | HEART RATE: 70 BPM | HEIGHT: 62 IN | SYSTOLIC BLOOD PRESSURE: 103 MMHG | OXYGEN SATURATION: 99 %

## 2017-06-10 DIAGNOSIS — R13.10 DYSPHAGIA, UNSPECIFIED TYPE: ICD-10-CM

## 2017-06-10 DIAGNOSIS — T85.528A GASTROJEJUNOSTOMY TUBE DISLODGEMENT: ICD-10-CM

## 2017-06-10 LAB
DEPRECATED S PYO AG THROAT QL EIA: NORMAL
MICRO REPORT STATUS: NORMAL
SPECIMEN SOURCE: NORMAL

## 2017-06-10 PROCEDURE — 99207 ZZC APP CREDIT; MD BILLING SHARED VISIT: CPT | Mod: 25 | Performed by: EMERGENCY MEDICINE

## 2017-06-10 PROCEDURE — C1769 GUIDE WIRE: HCPCS

## 2017-06-10 PROCEDURE — 87880 STREP A ASSAY W/OPTIC: CPT | Performed by: EMERGENCY MEDICINE

## 2017-06-10 PROCEDURE — 40000065 ZZH STATISTIC EKG NON-CHARGEABLE: Performed by: EMERGENCY MEDICINE

## 2017-06-10 PROCEDURE — 93010 ELECTROCARDIOGRAM REPORT: CPT | Mod: Z6 | Performed by: EMERGENCY MEDICINE

## 2017-06-10 PROCEDURE — 27210814 ZZ H TUBE GASTRO CR12

## 2017-06-10 PROCEDURE — 27210905 ZZH KIT CR7

## 2017-06-10 PROCEDURE — C1887 CATHETER, GUIDING: HCPCS

## 2017-06-10 PROCEDURE — 25000128 H RX IP 250 OP 636: Performed by: RADIOLOGY

## 2017-06-10 PROCEDURE — 99284 EMERGENCY DEPT VISIT MOD MDM: CPT | Performed by: EMERGENCY MEDICINE

## 2017-06-10 PROCEDURE — 93005 ELECTROCARDIOGRAM TRACING: CPT | Performed by: EMERGENCY MEDICINE

## 2017-06-10 PROCEDURE — 49452 REPLACE G-J TUBE PERC: CPT

## 2017-06-10 RX ORDER — FLUCONAZOLE 200 MG/1
TABLET ORAL
Qty: 15 TABLET | Refills: 0 | Status: SHIPPED | OUTPATIENT
Start: 2017-06-10 | End: 2019-06-10

## 2017-06-10 RX ORDER — IODIXANOL 320 MG/ML
50 INJECTION, SOLUTION INTRAVASCULAR ONCE
Status: COMPLETED | OUTPATIENT
Start: 2017-06-10 | End: 2017-06-10

## 2017-06-10 RX ADMIN — IODIXANOL 20 ML: 320 INJECTION, SOLUTION INTRAVASCULAR at 15:49

## 2017-06-10 ASSESSMENT — ENCOUNTER SYMPTOMS
FEVER: 0
RHINORRHEA: 0
COUGH: 0
SHORTNESS OF BREATH: 0
VOMITING: 0
SORE THROAT: 1
ABDOMINAL PAIN: 0

## 2017-06-10 NOTE — ED NOTES
Bed: IN01  Expected date:   Expected time:   Means of arrival:   Comments:  Chantell Kidd   [birth year]  GJ tube pulled out,  pancreatic transplant   Needs GJ gor nutritional support and GJ dependent  Coming by EMS

## 2017-06-10 NOTE — ED AVS SNAPSHOT
[facility], [place], Emergency Department    [address] [place] [address]    Phone:  [phone]                                       Chantell Kidd   MRN: [id number]    Department:  [facility], Emergency Department   Date of Visit:  6/10/2017           After Visit Summary Signature Page     I have received my discharge instructions, and my questions have been answered. I have discussed any challenges I see with this plan with the nurse or doctor.    ..........................................................................................................................................  Patient/Patient Representative Signature      ..........................................................................................................................................  Patient Representative Print Name and Relationship to Patient    ..................................................               ................................................  Date                                            Time    ..........................................................................................................................................  Reviewed by Signature/Title    ...................................................              ..............................................  Date                                                            Time

## 2017-06-10 NOTE — DISCHARGE INSTRUCTIONS
Please make an appointment to follow up with your gastroenterologist as soon as possible.  If you have increased sore throat, trouble swallowing, shortness of breath or other concerns, return to the emergency department for re-evaluation.        Self-Care for Sore Throats  Sore throats occur for many reasons, such as colds, allergies, and infections caused by viruses or bacteria. In any case, your throat becomes red and sore. Your goal for self-care is to reduce your discomfort while giving your throat a chance to heal.    Moisten and Soothe Your Throat    Try a sip of water first thing after waking up.    Keep your throat moist by drinking 6 or more glasses of clear liquids every day.    Run a cool-air humidifier in your room overnight.    Avoid cigarette smoke.     Suck on throat lozenges, cough drops, hard candy, ice chips, or frozen fruit-juice bars. Use the sugar-free versions if your diet or medical condition require them.  Gargle to Ease Irritation  Gargling every hour or 2 can ease irritation. Try gargling with 1 of these solutions:    1/4 teaspoon of salt in 1/2 cup of warm water    An over-the-counter anesthetic gargle  Use Medication for More Relief  Over-the-counter medication can reduce sore throat symptoms. Ask your pharmacist if you have questions about which medication to use:    Ease pain with anesthetic sprays. Aspirin or an aspirin substitute also helps. Remember, never give aspirin to anyone 18 or younger, or if you are already taking blood thinners.     For sore throats caused by allergies, try antihistamines to block the allergic reaction.    Remember: unless a sore throat is caused by a bacterial infection, antibiotics won t help you.  Prevent Future Sore Throats    Stop smoking or reduce contact with secondhand smoke. Smoke irritates the tender throat lining.    Limit contact with pets and with allergy-causing substances, such as pollen and mold.    When you re around someone with a sore  throat or cold, wash your hands frequently to keep viruses or bacteria from spreading.    Don t strain your vocal cords.  Call Your Health Care Provider  Contact your doctor if you have:    A temperature over 101 F (38.3 C)    White spots on the throat    Great difficulty swallowing    Trouble breathing    A skin rash    Recent exposure to someone else with strep bacteria    Severe hoarseness and swollen glands in the neck or jaw     [id number] [employer]. [address] [place] [address], [place] [address]. All rights reserved. This information is not intended as a substitute for professional medical care. Always follow your healthcare professional's instructions.

## 2017-06-10 NOTE — ED PROVIDER NOTES
"  History     Chief Complaint   Patient presents with     Gtube Problem     HPI  Chantell Kidd is a 51 year old female status post auto islet transplant in GJ tube placement for nutritional support who presents following GJ tube dislodgement this morning.  She reports the tube was pulled this morning.  She went to an outside ED however interventional radiology is not available.  She was transferred here for further workup.  She does receive nearly all of her nutritional support via GJ tube as well as some medications.  In addition she reports she has had approximately 10 days of a sore throat with a hoarse voice.  She has painful swallowing and has noted some white plaques in the posterior oropharynx.  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  She does have pain with swallowing but is able to tolerate liquids and does not feel that anything is becoming obstructed within the esophagus.  She denies any shortness of breath or drooling.  She denies any recent fevers.  She denies dental work recently antibiotic use.  She has been using nystatin swish and swallow but has not had any improvement.    I have reviewed the Medications, Allergies, Past Medical and Surgical History, and Social History in the Epic system.    Review of Systems   Constitutional: Negative for fever.   HENT: Positive for sore throat. Negative for drooling and rhinorrhea.    Respiratory: Negative for cough and shortness of breath.    Cardiovascular: Negative for chest pain.   Gastrointestinal: Negative for abdominal pain and vomiting.   All other systems reviewed and are negative.      Physical Exam   BP: 106/70  Pulse: 70  Heart Rate: 72  Temp: 98.5  F (36.9  C)  Resp: 18  Height: 157.5 cm (5' 2\")  Weight: 59.9 kg (132 lb)  SpO2: 100 %  Physical Exam   General: patient is alert and oriented and in no acute distress   Head: atraumatic and normocephalic   EENT: moist " mucus membranes without tonsillar erythema, trace white exudates in the far posterior oropharynx, uvula is midline, no trismus, no induration of the submandibular tissue, pupils round and reactive   Neck: supple, full ROM  Cardiovascular: regular rate and rhythm, extremities warm and well perfused, no lower extremity edema  Pulmonary: lungs clear to auscultation bilaterally   Abdomen: soft, non-tender, temporary 16F catheter in place, no surrounding erythema or discharge from GJ tube site  Musculoskeletal: normal range of motion   Neurological: alert and oriented, moving all extremities symmetrically, gait normal   Skin: warm, dry       ED Course     ED Course     Procedures             EKG Interpretation:      Interpreted by Carina Ramírez  Time reviewed: 1715  Symptoms at time of EKG: none   Rhythm: normal sinus   Rate: normal  Axis: normal  Ectopy: none  Conduction: normal  ST Segments/ T Waves: No ST-T wave changes  Q Waves: none  Comparison to prior: No old EKG available    Clinical Impression: normal EKG          Critical Care time:  none               Labs Ordered and Resulted from Time of ED Arrival Up to the Time of Departure from the ED - No data to display         Assessments & Plan (with Medical Decision Making)   Ms. Kidd is a 51 year old female status post auto islet transplant in GJ tube placement for nutritional support who presents following GJ tube dislodgement this morning.  The patient was taken with IR and a 16 [place] GJ tube was replaced.  The tube is functioning well and patient is not having any abdominal discomfort.  In regards to her sore throat and dysphasia she reports this does feel very similar to her previous episode of candida esophagitis that is not responding to nystatin.  Rapid strep was negative.  She does not have any evidence of airway compromise and is nontoxic appearing, tolerating her secretions without difficulty.  Did not suspect epiglottitis, peritonsillar abscess,  retropharyngeal abscess or other emergent pathology.  She was given a prescription for fluconazole and will follow-up with her gastroenterologist this week for further workup and management.  She was given close return instructions and voiced understanding.      I have reviewed the nursing notes.    I have reviewed the findings, diagnosis, plan and need for follow up with the patient.    Discharge Medication List as of 6/10/2017  5:19 PM      START taking these medications    Details   fluconazole (DIFLUCAN) 200 MG tablet Take 2 tabs the first day and 1 tab for the next 13 days, Disp-15 tablet, R-0, Local Print             Final diagnoses:   Gastrojejunostomy tube dislodgement (H)   Dysphagia, unspecified type       6/10/2017   [facility], [place], EMERGENCY DEPARTMENT     Carina Ramírez MD  06/10/17 5637

## 2017-06-10 NOTE — ED AVS SNAPSHOT
[facility], Emergency Department    [address] [address]    Phone:  [phone]                                       Chantell Kidd   MRN: [id number]    Department:  [facility], Emergency Department   Date of Visit:  6/10/2017           Patient Information     Date Of Birth          1965        Your diagnoses for this visit were:     Gastrojejunostomy tube dislodgement (H)     Dysphagia, unspecified type        You were seen by Carina Ramírez MD.        Discharge Instructions       Please make an appointment to follow up with your gastroenterologist as soon as possible.  If you have increased sore throat, trouble swallowing, shortness of breath or other concerns, return to the emergency department for re-evaluation.        Self-Care for Sore Throats  Sore throats occur for many reasons, such as colds, allergies, and infections caused by viruses or bacteria. In any case, your throat becomes red and sore. Your goal for self-care is to reduce your discomfort while giving your throat a chance to heal.    Moisten and Soothe Your Throat    Try a sip of water first thing after waking up.    Keep your throat moist by drinking 6 or more glasses of clear liquids every day.    Run a cool-air humidifier in your room overnight.    Avoid cigarette smoke.     Suck on throat lozenges, cough drops, hard candy, ice chips, or frozen fruit-juice bars. Use the sugar-free versions if your diet or medical condition require them.  Gargle to Ease Irritation  Gargling every hour or 2 can ease irritation. Try gargling with 1 of these solutions:    1/4 teaspoon of salt in 1/2 cup of warm water    An over-the-counter anesthetic gargle  Use Medication for More Relief  Over-the-counter medication can reduce sore throat symptoms. Ask your pharmacist if you have questions about which medication to use:    Ease pain with anesthetic sprays. Aspirin or an aspirin substitute also helps. Remember, never give aspirin  to anyone 18 or younger, or if you are already taking blood thinners.     For sore throats caused by allergies, try antihistamines to block the allergic reaction.    Remember: unless a sore throat is caused by a bacterial infection, antibiotics won t help you.  Prevent Future Sore Throats    Stop smoking or reduce contact with secondhand smoke. Smoke irritates the tender throat lining.    Limit contact with pets and with allergy-causing substances, such as pollen and mold.    When you re around someone with a sore throat or cold, wash your hands frequently to keep viruses or bacteria from spreading.    Don t strain your vocal cords.  Call Your Health Care Provider  Contact your doctor if you have:    A temperature over 101 F (38.3 C)    White spots on the throat    Great difficulty swallowing    Trouble breathing    A skin rash    Recent exposure to someone else with strep bacteria    Severe hoarseness and swollen glands in the neck or jaw     2021-3444 [employer]. [address] [place] [address]. All rights reserved. This information is not intended as a substitute for professional medical care. Always follow your healthcare professional's instructions.             Future Appointments        Provider Department Dept Phone Center    7/25/2017 9:20 AM Claudia Sosa MD [facility] [phone] [facility]    10/9/2017 8:00 AM Thomas Estrada MD [facility]ology and [phone] [facility]      24 Hour Appointment Hotline       To make an appointment at any [facility], call [phone] ([phone]). If you don't have a family doctor or clinic, we will help you find one. [facility] are conveniently located to serve the needs of you and your family.          ED Discharge Orders     Discharge Instructions       If questions or problems arise regarding tube function (e.g. leaking, dislodges, etc.) Contact Interventional  Radiology department 24 hours a day.    For procedures that were done at the [facility] sites,   8:00-4:30 PM Monday through Friday    Contact:[phone].    For afterhours and weekends call the [place] main phone line [phone] and ask for the [place] IR on call physician number.    If DIRECTED by the RADIOLOGIST, related to specific problems with the tube functioning,  go to the Emergency Department.            Discharge Instructions       Patient to make a follow up appointment in IR clinic in 10-14 days for the removal of the retention sutures at the G or GJ tube site. Phone number is [phone] if needed.                     Review of your medicines      START taking        Dose / Directions Last dose taken    fluconazole 200 MG tablet   Commonly known as:  DIFLUCAN   Quantity:  15 tablet        Take 2 tabs the first day and 1 tab for the next 13 days   Refills:  0          Our records show that you are taking the medicines listed below. If these are incorrect, please call your family doctor or clinic.        Dose / Directions Last dose taken    acetaminophen 500 MG Caps        Take 1,000 mg by mouth three times a day as needed.   Refills:  0        alendronate 70 MG tablet   Commonly known as:  FOSAMAX   Dose:  70 mg   Quantity:  4 tablet        Take 1 tablet (70 mg) by mouth every 7 days On Sundays take first thing in the morning with plain water and remain upright for at least 30 minutes and until after first food of the day  Do not restart Fosamax (alendronate) until your difficulty swallowing has resolved and you have finished the entire course of fluconazole (Diflucan).   Refills:  0        alum & mag hydroxide-simethicone 200-200-25 MG Chew chewable tablet   Commonly known as:  GELUSIL   Quantity:  100 tablet        CHEW AND SWALLOW TWO CHEWS BY MOUTH EVERY 4 HOURS AS NEEDED FOR INDIGESTION   Refills:  1        amylase-lipase-protease 72216 UNITS Cpep   Commonly known as:  CREON  12   Indication:  1-2 capsules with snacks.   Quantity:  2160 capsule        Take 6 capsules with meals and 3 with snacks.  This is up to 24 capsules per day.   Refills:  3        aspirin 81 MG tablet        Take 81 mg by mouth daily.   Refills:  0        blood glucose monitoring lancets   Quantity:  102 each        by Lancet route. Use to test blood sugar daily or as directed.   Refills:  prn        * blood glucose monitoring test strip   Commonly known as:  no brand specified   Quantity:  240 each        Use to test blood glucoses 6-8 times per day.   Refills:  11        * blood glucose monitoring test strip   Commonly known as:  NOEMI CONTOUR NEXT   Quantity:  240 each        Use to test blood sugar 8 times daily.   Refills:  11        * BOOST HIGH PROTEIN Liqd        After above baseline labs are drawn, give: 6 mL/kg to maximum of 360 mL; the beverage is to be consumed within 5 minutes.   Refills:  0        * BOOST HIGH PROTEIN Liqd        Also can use Ensure clear (available over the counter)   Refills:  0        * BOOST HIGH PROTEIN Liqd        After above baseline labs are drawn, give: 6 mL/kg to maximum of 360 mL; the beverage is to be consumed within 5 minutes.   Refills:  0        cyclobenzaprine 5 MG tablet   Commonly known as:  FLEXERIL   Dose:  5 mg   Quantity:  42 tablet        Take 1 tablet (5 mg) by mouth 3 times daily as needed for muscle spasms   Refills:  3        diclofenac 1 % Gel topical gel   Commonly known as:  VOLTAREN   Dose:  2 g        2 g Apply 2 g to skin four times a day as needed (to affected upper extremity joint(s)). Maximum 8g/day per joint, 16g/day total.   Refills:  0        dicyclomine 10 MG capsule   Commonly known as:  BENTYL   Quantity:  40 capsule        TAKE ONE CAPSULE BY MOUTH EVERY 6 HOURS AS NEEDED   Refills:  3        docusate sodium 100 MG capsule   Commonly known as:  DOCQLACE   Dose:  100 mg   Quantity:  120 capsule        Take 1 capsule (100 mg) by mouth daily as  needed for constipation   Refills:  1        dronabinol 2.5 MG capsule   Commonly known as:  MARINOL   Dose:  5 mg   Quantity:  56 capsule        Take 2 capsules (5 mg) by mouth 2 times daily as needed   Refills:  0        * DULoxetine 30 MG EC capsule   Commonly known as:  CYMBALTA   Dose:  30 mg   Quantity:  90 capsule        Take 1 capsule (30 mg) by mouth daily With 60mg capsule for total dose of 90mg   Refills:  1        * DULoxetine 60 MG EC capsule   Commonly known as:  CYMBALTA   Dose:  60 mg   Quantity:  90 capsule        Take 1 capsule (60 mg) by mouth daily   Refills:  1        furosemide 20 MG tablet   Commonly known as:  LASIX   Dose:  20 mg   Quantity:  180 tablet        Take 1 tablet (20 mg) by mouth 2 times daily   Refills:  2        hydrocortisone 10 MG tablet   Commonly known as:  CORTEF   Quantity:  60 tablet        Take 10 mg in the morning and 10 mg at bedtime. Watch for hypoglycemia recurrence.   Refills:  3        insulin aspart 100 UNITS/ML injection   Commonly known as:  NovoLOG VIAL   Quantity:  36 vial        As directed in pump   Refills:  prn        insulin pen needle 31G X 5 MM   Quantity:  2 Box        RX# [id number]  Pen Needle UC 31G UF IV Mini  Use 4-8 needles per day for insulin injections.   Refills:  11        levothyroxine 112 MCG tablet   Commonly known as:  SYNTHROID/LEVOTHROID   Dose:  112 mcg   Quantity:  90 tablet        Take 1 tablet (112 mcg) by mouth daily   Refills:  3        linaclotide 145 MCG capsule   Commonly known as:  LINZESS   Dose:  145 mcg   Quantity:  30 capsule        Take 1 capsule (145 mcg) by mouth every morning (before breakfast)   Refills:  1        metoclopramide 5 MG tablet   Commonly known as:  REGLAN   Quantity:  100 tablet        Take 2 tablets (10 mg) by mouth 3 times daily (before meals   Refills:  6        morphine 0.1% in solosite topical gel   Dose:  1 g   Quantity:  25 g        Place 1 g onto the skin 4 times daily as needed for pain   Refills:   0        nystatin 131801 unit/mL Susp suspension   Commonly known as:  MYCOSTATIN   Dose:  616407 Units   Quantity:  60 mL        Take 1 mL (100,000 Units) by mouth 4 times daily   Refills:  1        ondansetron 4 MG ODT tab   Commonly known as:  ZOFRAN-ODT   Quantity:  60 tablet        DISSOLVE ONE TABLET ON THE TONGUE EVERY 6 HOURS AS NEEDED FOR NAUSEA   Refills:  1        oxyCODONE 5 MG IR tablet   Commonly known as:  ROXICODONE   Dose:  5 mg   Quantity:  50 tablet        Take 1 tablet (5 mg) by mouth every 6 hours as needed   Refills:  0        pantoprazole 40 MG EC tablet   Commonly known as:  PROTONIX   Dose:  40 mg   Quantity:  180 tablet        Take 1 tablet (40 mg) by mouth 2 times daily   Refills:  3        polyethylene glycol Packet   Commonly known as:  MIRALAX/GLYCOLAX   Dose:  1 packet   Quantity:  14 each        Take 1 packet by mouth 2 times daily as needed for constipation   Refills:  5        potassium chloride SA 20 MEQ CR tablet   Commonly known as:  K-DUR/KLOR-CON M   Dose:  20 mEq   Quantity:  100 tablet        Take 1 tablet (20 mEq) by mouth daily   Refills:  1        pregabalin 150 MG capsule   Commonly known as:  LYRICA   Dose:  150 mg   Quantity:  90 capsule        Take 1 capsule (150 mg) by mouth 3 times daily   Refills:  5        senna 8.6 MG tablet   Commonly known as:  SENOKOT   Dose:  1-2 tablet   Quantity:  30 tablet        Take 1-2 tablets by mouth daily as needed for constipation   Refills:  5        sodium bicarbonate 650 MG tablet   Quantity:  135 tablet        Take one-half tablet (325 mg), via feeding tube every 4 hours.   Refills:  3        SUMAtriptan 50 MG tablet   Commonly known as:  IMITREX   Dose:  50 mg   Quantity:  30 tablet        Take 1 tablet (50 mg) by mouth at onset of headache for migraine Take 1 Tab by mouth Once as needed for Migraine Headache. May repeat after two hours.  Maximum dose 200 mg/24 hours.   Refills:  1        topiramate 100 MG tablet   Commonly  known as:  TOPAMAX   Dose:  100 mg   Quantity:  180 tablet        Take 1 tablet (100 mg) by mouth 2 times daily   Refills:  1        traZODone 100 MG tablet   Commonly known as:  DESYREL   Dose:  150 mg   Quantity:  100 tablet        Take 1.5 tablets (150 mg) by mouth At Bedtime Take one to two tablets by mouth an hour before bedtime   Refills:  1        * Notice:  This list has 7 medication(s) that are the same as other medications prescribed for you. Read the directions carefully, and ask your doctor or other care provider to review them with you.            Prescriptions were sent or printed at these locations (1 Prescription)                   [id number] - [place], [place] - [address]   [address] [address]    Telephone:  [phone]   Fax:  [phone]   Hours:  test Rx sent successfully 12/26/02  KR                Printed at Department/Unit printer (1 of 1)         fluconazole (DIFLUCAN) 200 MG tablet                Procedures and tests performed during your visit     EKG 12-lead, tracing only    IR Gastro Jejunostomy Tube Change    Rapid strep screen      Orders Needing Specimen Collection     None      Pending Results     Date and Time Order Name Status Description    6/10/2017 1703 EKG 12-lead, tracing only Preliminary             Pending Culture Results     No orders found from 6/8/2017 to 6/11/2017.            Pending Results Instructions     If you had any lab results that were not finalized at the time of your Discharge, you can call the ED Lab Result RN at [phone]. You will be contacted by this team for any positive Lab results or changes in treatment. The nurses are available 7 days a week from 10A to 6:30P.  You can leave a message 24 hours per day and they will return your call.        Thank you for choosing [facility]       Thank you for choosing [facility] for your care. Our goal is always to provide you with excellent care. Hearing back from our patients is  one way we can continue to improve our services. Please take a few minutes to complete the written survey that you may receive in the mail after you visit with us. Thank you!        [employer] Information     [employer] gives you secure access to your electronic health record. If you see a primary care provider, you can also send messages to your care team and make appointments. If you have questions, please call your primary care clinic.  If you do not have a primary care provider, please call [phone] and they will assist you.        Care EveryWhere ID     This is your Care EveryWhere ID. This could be used by other organizations to access your [facility] medical records  [id number]        After Visit Summary       This is your record. Keep this with you and show to your community pharmacist(s) and doctor(s) at your next visit.

## 2017-06-10 NOTE — PROCEDURES
Interventional Radiology Brief Post Procedure Note    Procedure: IR GASTRO JEJUNOSTOMY TUBE CHANGE    Proceduralist: Arturo Hutson MD, MD and Julio Madrigal MD    Time Out: Prior to the start of the procedure and with procedural staff participation, I verbally confirmed the patient s identity using two indicators, relevant allergies, that the procedure was appropriate and matched the consent or emergent situation, and that the correct equipment/implants were available. Immediately prior to starting the procedure I conducted the Time Out with the procedural staff and re-confirmed the patient s name, procedure, and site/side. (The Joint Commission universal protocol was followed.)  Yes    Sedation: None. Local Anesthestic used    Findings: The tube was exchanged with a new 16F 45 cm GJ tube. The granulation tissue in the skin entrance site was cauterized with Silver Nitrate Q-tip applicators.     Estimated Blood Loss: None    Fluoroscopy Time:  minute(s)    SPECIMENS: None    Complications: 1. None     Condition: Stable    Plan:   Followup with IR in 9 months for routine exchange of the GJ tube    Comments: See dictated procedure note for full details.    Arturo Hutson MD, MD      \

## 2017-06-10 NOTE — IR NOTE
Interventional Radiology Intra-procedural Nursing Note    Patient Name: Chantell Kidd  Medical Record Number: [id number]  Today's Date: Meme 10, 2017    Start Time: 1525  End of procedure time: 1545  Procedure: GJ tube exchange  Report given to: CARLOS Crowley  Time pt departs:  1555    Dr Madrigal, Dr Powell    Other Notes: Pt to IR 3 from ED 10. Consent is on media. Pt is prepped and time out done with Dr Madrigal and Dr Powell. Fire safety of 0. 16 fr x 45 cm GJ tube exchange done and silver nitrate to the entrance tissues. Pt tolerated well and returns to ED 10.     Anaya Carvalho

## 2017-06-10 NOTE — TELEPHONE ENCOUNTER
Reason for Disposition    SEVERE abdominal pain     Pain 8 out of 10.    Additional Information    Negative: Shock suspected (e.g., cold/pale/clammy skin, too weak to stand, low BP, rapid pulse)    Negative: [1] Shortness of breath AND [2] new onset    Negative: Bluish lips, tongue, or face now    Negative: Sounds like a life-threatening emergency to the triager    Protocols used: FEEDING TUBE SYMPTOMS AND QUESTIONS-ADULT-

## 2017-06-10 NOTE — ED NOTES
Pt is a transfer from [place] ED. Pt has had some problems with her G tube and pulled it earlier today. Sent from [place] to have her G tube replaced by IR. Pt does have a ledesma catheter in place at the G tube site. Pt reports abdominal pain, and pain, soreness in her mouth related to ongoing oral thrush. Pt alert and oriented x4, vitals stable, afebrile.

## 2017-06-11 LAB — INTERPRETATION ECG - MUSE: NORMAL

## 2017-06-19 ENCOUNTER — TELEPHONE (OUTPATIENT)
Dept: GASTROENTEROLOGY | Facility: CLINIC | Age: 52
End: 2017-06-19

## 2017-06-19 DIAGNOSIS — Z12.11 ENCOUNTER FOR SCREENING COLONOSCOPY: Primary | ICD-10-CM

## 2017-06-19 NOTE — TELEPHONE ENCOUNTER
Patient scheduled for Colonoscopy    Indication for procedure. Nausea and vomiting, intractability of vomiting not specified, unspecified vomiting type     Referring Provider. Thomas Estrada MD    ? Not Needed    Arrival time verified? Yes, 1130    Facility location verified? Yes, [address]    Instructions given regarding prep and procedure    Prep Type 2 day Golytely    Are you taking any anticoagulants or blood thinners? Aspirin    Instructions given? Stopped    Electronic implanted devices? No    Pre procedure teaching completed? Yes    Transportation from procedure? Daughter    H&P / Pre op physical completed? Will be completed on 06/22/17 per patient

## 2017-06-21 DIAGNOSIS — M54.9 CHRONIC BACK PAIN, UNSPECIFIED BACK LOCATION, UNSPECIFIED BACK PAIN LATERALITY: ICD-10-CM

## 2017-06-21 DIAGNOSIS — F51.01 PRIMARY INSOMNIA: ICD-10-CM

## 2017-06-21 DIAGNOSIS — G89.29 CHRONIC BACK PAIN, UNSPECIFIED BACK LOCATION, UNSPECIFIED BACK PAIN LATERALITY: ICD-10-CM

## 2017-06-21 DIAGNOSIS — R53.81 PHYSICAL DECONDITIONING: ICD-10-CM

## 2017-06-21 DIAGNOSIS — K59.00 CONSTIPATION, UNSPECIFIED CONSTIPATION TYPE: ICD-10-CM

## 2017-06-24 DIAGNOSIS — E87.6 HYPOKALEMIA: ICD-10-CM

## 2017-06-24 DIAGNOSIS — K59.09 OTHER CONSTIPATION: ICD-10-CM

## 2017-06-26 DIAGNOSIS — Z00.00 ROUTINE HEALTH MAINTENANCE: ICD-10-CM

## 2017-06-26 RX ORDER — SENNOSIDES A AND B 8.6 MG/1
1-2 TABLET, FILM COATED ORAL DAILY PRN
Qty: 90 TABLET | Refills: 1 | Status: ON HOLD | OUTPATIENT
Start: 2017-06-26 | End: 2018-08-27

## 2017-06-26 NOTE — TELEPHONE ENCOUNTER
Last refill 3/14/17. Last office visit 12/2016. [facility] site verified 6/26/17.    Jahaira Mi RN

## 2017-06-26 NOTE — TELEPHONE ENCOUNTER
luis        Last Written Prescription Date:  11/8/16  Last Fill Quantity: 30,   # refills: 5  Last Office Visit : 12/20/16  Future Office visit:  NONE     potassium chloride       Last Written Prescription Date:8/16/16  Last Fill Quantity: 100,   # refills: 1     Potassium   Date Value Ref Range Status   01/19/2017 4.0 3.4 - 5.3 mmol/L Final

## 2017-06-26 NOTE — TELEPHONE ENCOUNTER
dronabinol (MARINOL) 2.5 MG capsule      Last Written Prescription Date:  3/14/2017  Last Fill Quantity: 56,   # refills: 0  Last Office Visit : 12/20/2016  Future Office visit:  None    Routing refill request to provider's nurse per protocol for controlled substance.  Pended Rx as last ordered.  Please adjust as advised.

## 2017-06-27 ENCOUNTER — TELEPHONE (OUTPATIENT)
Dept: GASTROENTEROLOGY | Facility: CLINIC | Age: 52
End: 2017-06-27

## 2017-06-27 DIAGNOSIS — E27.40 ADRENAL INSUFFICIENCY (H): ICD-10-CM

## 2017-06-27 DIAGNOSIS — F33.1 MAJOR DEPRESSIVE DISORDER, RECURRENT EPISODE, MODERATE (H): ICD-10-CM

## 2017-06-27 DIAGNOSIS — F41.1 GAD (GENERALIZED ANXIETY DISORDER): ICD-10-CM

## 2017-06-27 DIAGNOSIS — E16.2 HYPOGLYCEMIA: ICD-10-CM

## 2017-06-27 RX ORDER — HYDROCORTISONE 10 MG/1
TABLET ORAL
Qty: 60 TABLET | Refills: 3 | Status: SHIPPED | OUTPATIENT
Start: 2017-06-27 | End: 2019-03-19

## 2017-06-27 RX ORDER — POTASSIUM CHLORIDE 1500 MG/1
20 TABLET, EXTENDED RELEASE ORAL DAILY
Qty: 30 TABLET | Refills: 0 | Status: SHIPPED | OUTPATIENT
Start: 2017-06-27 | End: 2017-09-09

## 2017-06-27 RX ORDER — DULOXETIN HYDROCHLORIDE 30 MG/1
30 CAPSULE, DELAYED RELEASE ORAL DAILY
Qty: 90 CAPSULE | Refills: 1 | Status: SHIPPED | OUTPATIENT
Start: 2017-06-27 | End: 2019-03-15

## 2017-06-27 RX ORDER — DRONABINOL 2.5 MG/1
5 CAPSULE ORAL 2 TIMES DAILY PRN
Qty: 56 CAPSULE | Refills: 0
Start: 2017-06-27 | End: 2017-10-30

## 2017-06-27 RX ORDER — DULOXETIN HYDROCHLORIDE 60 MG/1
60 CAPSULE, DELAYED RELEASE ORAL DAILY
Qty: 90 CAPSULE | Refills: 1 | Status: SHIPPED | OUTPATIENT
Start: 2017-06-27

## 2017-06-27 NOTE — TELEPHONE ENCOUNTER
DULoxetine (CYMBALTA) 30 and 60 MG EC capsule      Last Written Prescription Date:  12/14/2016   Last Fill Quantity: 90,   # refills: 1  Last Office Visit : 12/20/2016  Future Office visit:  None    BP Readings from Last 1 Encounters:   06/10/17 103/64

## 2017-06-30 ENCOUNTER — TELEPHONE (OUTPATIENT)
Dept: INTERNAL MEDICINE | Facility: CLINIC | Age: 52
End: 2017-06-30

## 2017-06-30 DIAGNOSIS — N64.4 BREAST PAIN: Primary | ICD-10-CM

## 2017-06-30 NOTE — TELEPHONE ENCOUNTER
I received a message stating pt called to schedule mammogram, but complained of breast pain. Will need orders for diagnostic mammogram and ultrasound.     Jahaira Mi RN

## 2017-07-03 DIAGNOSIS — F51.01 PRIMARY INSOMNIA: ICD-10-CM

## 2017-07-03 DIAGNOSIS — K59.00 CONSTIPATION, UNSPECIFIED CONSTIPATION TYPE: ICD-10-CM

## 2017-07-03 DIAGNOSIS — G89.29 CHRONIC BACK PAIN, UNSPECIFIED BACK LOCATION, UNSPECIFIED BACK PAIN LATERALITY: ICD-10-CM

## 2017-07-03 DIAGNOSIS — M54.9 CHRONIC BACK PAIN, UNSPECIFIED BACK LOCATION, UNSPECIFIED BACK PAIN LATERALITY: ICD-10-CM

## 2017-07-03 DIAGNOSIS — R53.81 PHYSICAL DECONDITIONING: ICD-10-CM

## 2017-07-06 RX ORDER — LINACLOTIDE 145 UG/1
CAPSULE, GELATIN COATED ORAL
Qty: 30 CAPSULE | Refills: 1 | OUTPATIENT
Start: 2017-07-06

## 2017-07-12 DIAGNOSIS — R10.9 ABDOMINAL PAIN: ICD-10-CM

## 2017-07-13 ENCOUNTER — MEDICAL CORRESPONDENCE (OUTPATIENT)
Dept: HEALTH INFORMATION MANAGEMENT | Facility: CLINIC | Age: 52
End: 2017-07-13

## 2017-07-14 ENCOUNTER — PRE VISIT (OUTPATIENT)
Dept: ANESTHESIOLOGY | Facility: CLINIC | Age: 52
End: 2017-07-14

## 2017-07-14 NOTE — TELEPHONE ENCOUNTER
1.  Date/reason for appt: 7/25/17 - Chronic Back Pain  2.  Referring provider: Dr. Morgan  3.  Call to patient (Yes / No - short description): no, internal referral  4.  Previous care at / records requested from: [facility] -- records and referral in epic

## 2017-07-14 NOTE — TELEPHONE ENCOUNTER
GELUSIL       Last Written Prescription Date:  3/20/17  Last Fill Quantity: 100,   # refills: 1  Last Office Visit : 12/20/16  Future Office visit:  0  Routing refill request to provider for review/approval because:  Drug not on the refill protocol or controlled substance

## 2017-07-24 ENCOUNTER — TELEPHONE (OUTPATIENT)
Dept: ANESTHESIOLOGY | Facility: CLINIC | Age: 52
End: 2017-07-24

## 2017-07-24 NOTE — TELEPHONE ENCOUNTER
Reminder call placed to pt.   Pt reminded of Provider, date and time of the appointment.   Pt was asked to arrive 15 minutes early to fill out the questionnaires.   Clinic phone number provided if pt needed to reschedule.

## 2017-07-25 ENCOUNTER — TELEPHONE (OUTPATIENT)
Dept: GASTROENTEROLOGY | Facility: CLINIC | Age: 52
End: 2017-07-25

## 2017-07-25 DIAGNOSIS — G89.29 CHRONIC BACK PAIN, UNSPECIFIED BACK LOCATION, UNSPECIFIED BACK PAIN LATERALITY: ICD-10-CM

## 2017-07-25 DIAGNOSIS — R53.81 PHYSICAL DECONDITIONING: ICD-10-CM

## 2017-07-25 DIAGNOSIS — K59.00 CONSTIPATION, UNSPECIFIED CONSTIPATION TYPE: ICD-10-CM

## 2017-07-25 DIAGNOSIS — F51.01 PRIMARY INSOMNIA: ICD-10-CM

## 2017-07-25 DIAGNOSIS — R10.9 AP (ABDOMINAL PAIN): ICD-10-CM

## 2017-07-25 DIAGNOSIS — M54.9 CHRONIC BACK PAIN, UNSPECIFIED BACK LOCATION, UNSPECIFIED BACK PAIN LATERALITY: ICD-10-CM

## 2017-07-25 DIAGNOSIS — Z12.11 ENCOUNTER FOR SCREENING COLONOSCOPY: Primary | ICD-10-CM

## 2017-07-25 NOTE — TELEPHONE ENCOUNTER
Patient scheduled for EGD and Colonoscopy    Indication for procedure. Abdominal pain, generalized, Nausea and vomiting, intractability of vomiting not specified, unspecified vomiting type     Referring Provider. Thomas Estrada MD , Ron Morgan MD    ? Not Needed    Arrival time verified? Yes, 0730    Facility location verified? Yes, [address]    Instructions given regarding prep and procedure    Prep Type 2 day Golytely    Are you taking any anticoagulants or blood thinners? Aspirin    Instructions given? Stopped    Electronic implanted devices? No    Pre procedure teaching completed? Yes    Transportation from procedure? Son    H&P / Pre op physical completed? Scheduled for 07/30/17 with Dr. Morgan per patient

## 2017-07-26 RX ORDER — TRAZODONE HYDROCHLORIDE 100 MG/1
100-200 TABLET ORAL AT BEDTIME
Qty: 100 TABLET | Refills: 1 | Status: SHIPPED | OUTPATIENT
Start: 2017-07-26 | End: 2018-04-07

## 2017-07-26 RX ORDER — DOCUSATE SODIUM 100 MG/1
100 CAPSULE, LIQUID FILLED ORAL DAILY PRN
Qty: 120 CAPSULE | Refills: 0 | Status: SHIPPED | OUTPATIENT
Start: 2017-07-26 | End: 2018-04-07

## 2017-07-26 NOTE — TELEPHONE ENCOUNTER
traZODone 100 MG       Last Written Prescription Date:  12/20/16  Last Fill Quantity: 100,   # refills: 1  Last Office Visit : 12/20/16  Future Office visit:  NONE     DOCQLACE       Last Written Prescription Date:  10/9/16  Last Fill Quantity: 120,   # refills: 1

## 2017-08-01 ENCOUNTER — SURGERY (OUTPATIENT)
Age: 52
End: 2017-08-01

## 2017-08-01 ENCOUNTER — HOSPITAL ENCOUNTER (OUTPATIENT)
Facility: CLINIC | Age: 52
Discharge: HOME | End: 2017-08-01
Attending: INTERNAL MEDICINE | Admitting: INTERNAL MEDICINE
Payer: MEDICARE

## 2017-08-01 ENCOUNTER — ANESTHESIA (OUTPATIENT)
Dept: GASTROENTEROLOGY | Facility: CLINIC | Age: 52
End: 2017-08-01
Payer: MEDICARE

## 2017-08-01 ENCOUNTER — ANESTHESIA EVENT (OUTPATIENT)
Dept: GASTROENTEROLOGY | Facility: CLINIC | Age: 52
End: 2017-08-01
Payer: MEDICARE

## 2017-08-01 VITALS
SYSTOLIC BLOOD PRESSURE: 111 MMHG | BODY MASS INDEX: 25.91 KG/M2 | WEIGHT: 140.8 LBS | DIASTOLIC BLOOD PRESSURE: 70 MMHG | TEMPERATURE: 97.4 F | RESPIRATION RATE: 13 BRPM | HEIGHT: 62 IN | OXYGEN SATURATION: 100 %

## 2017-08-01 LAB
ANION GAP SERPL CALCULATED.3IONS-SCNC: 8 MMOL/L (ref 3–14)
BUN SERPL-MCNC: 7 MG/DL (ref 7–30)
CALCIUM SERPL-MCNC: 9 MG/DL (ref 8.5–10.1)
CHLORIDE SERPL-SCNC: 106 MMOL/L (ref 94–109)
CO2 SERPL-SCNC: 25 MMOL/L (ref 20–32)
COLONOSCOPY: NORMAL
CREAT SERPL-MCNC: 0.65 MG/DL (ref 0.52–1.04)
ERYTHROCYTE [DISTWIDTH] IN BLOOD BY AUTOMATED COUNT: 13.3 % (ref 10–15)
GFR SERPL CREATININE-BSD FRML MDRD: ABNORMAL ML/MIN/1.7M2
GLUCOSE BLDC GLUCOMTR-MCNC: 101 MG/DL (ref 70–99)
GLUCOSE SERPL-MCNC: 120 MG/DL (ref 70–99)
HCT VFR BLD AUTO: 40 % (ref 35–47)
HGB BLD-MCNC: 13.1 G/DL (ref 11.7–15.7)
MCH RBC QN AUTO: 30.5 PG (ref 26.5–33)
MCHC RBC AUTO-ENTMCNC: 32.8 G/DL (ref 31.5–36.5)
MCV RBC AUTO: 93 FL (ref 78–100)
PLATELET # BLD AUTO: 358 10E9/L (ref 150–450)
POTASSIUM SERPL-SCNC: 3.9 MMOL/L (ref 3.4–5.3)
RBC # BLD AUTO: 4.3 10E12/L (ref 3.8–5.2)
SODIUM SERPL-SCNC: 139 MMOL/L (ref 133–144)
UPPER GI ENDOSCOPY: NORMAL
WBC # BLD AUTO: 5.5 10E9/L (ref 4–11)

## 2017-08-01 PROCEDURE — 43235 EGD DIAGNOSTIC BRUSH WASH: CPT | Performed by: INTERNAL MEDICINE

## 2017-08-01 PROCEDURE — 25000128 H RX IP 250 OP 636: Performed by: ANESTHESIOLOGY

## 2017-08-01 PROCEDURE — 36415 COLL VENOUS BLD VENIPUNCTURE: CPT | Performed by: INTERNAL MEDICINE

## 2017-08-01 PROCEDURE — 25000125 ZZHC RX 250: Performed by: NURSE ANESTHETIST, CERTIFIED REGISTERED

## 2017-08-01 PROCEDURE — 25000128 H RX IP 250 OP 636: Performed by: NURSE ANESTHETIST, CERTIFIED REGISTERED

## 2017-08-01 PROCEDURE — 45378 DIAGNOSTIC COLONOSCOPY: CPT | Performed by: INTERNAL MEDICINE

## 2017-08-01 PROCEDURE — 82962 GLUCOSE BLOOD TEST: CPT

## 2017-08-01 PROCEDURE — 25000132 ZZH RX MED GY IP 250 OP 250 PS 637: Mod: GY | Performed by: INTERNAL MEDICINE

## 2017-08-01 PROCEDURE — 80048 BASIC METABOLIC PNL TOTAL CA: CPT | Performed by: INTERNAL MEDICINE

## 2017-08-01 PROCEDURE — 37000008 ZZH ANESTHESIA TECHNICAL FEE, 1ST 30 MIN: Performed by: INTERNAL MEDICINE

## 2017-08-01 PROCEDURE — 37000009 ZZH ANESTHESIA TECHNICAL FEE, EACH ADDTL 15 MIN: Performed by: INTERNAL MEDICINE

## 2017-08-01 PROCEDURE — 85027 COMPLETE CBC AUTOMATED: CPT | Performed by: INTERNAL MEDICINE

## 2017-08-01 PROCEDURE — 40000141 ZZH STATISTIC PERIPHERAL IV START W/O US GUIDANCE

## 2017-08-01 RX ORDER — SIMETHICONE
LIQUID (ML) MISCELLANEOUS PRN
Status: DISCONTINUED | OUTPATIENT
Start: 2017-08-01 | End: 2017-08-01 | Stop reason: HOSPADM

## 2017-08-01 RX ORDER — SODIUM CHLORIDE, SODIUM LACTATE, POTASSIUM CHLORIDE, CALCIUM CHLORIDE 600; 310; 30; 20 MG/100ML; MG/100ML; MG/100ML; MG/100ML
INJECTION, SOLUTION INTRAVENOUS CONTINUOUS
Status: DISCONTINUED | OUTPATIENT
Start: 2017-08-01 | End: 2017-08-01 | Stop reason: HOSPADM

## 2017-08-01 RX ORDER — FENTANYL CITRATE 50 UG/ML
INJECTION, SOLUTION INTRAMUSCULAR; INTRAVENOUS PRN
Status: DISCONTINUED | OUTPATIENT
Start: 2017-08-01 | End: 2017-08-01

## 2017-08-01 RX ORDER — GLYCOPYRROLATE 0.2 MG/ML
INJECTION, SOLUTION INTRAMUSCULAR; INTRAVENOUS PRN
Status: DISCONTINUED | OUTPATIENT
Start: 2017-08-01 | End: 2017-08-01

## 2017-08-01 RX ORDER — NALOXONE HYDROCHLORIDE 0.4 MG/ML
.1-.4 INJECTION, SOLUTION INTRAMUSCULAR; INTRAVENOUS; SUBCUTANEOUS
Status: DISCONTINUED | OUTPATIENT
Start: 2017-08-01 | End: 2017-08-01 | Stop reason: HOSPADM

## 2017-08-01 RX ORDER — PROPOFOL 10 MG/ML
INJECTION, EMULSION INTRAVENOUS PRN
Status: DISCONTINUED | OUTPATIENT
Start: 2017-08-01 | End: 2017-08-01

## 2017-08-01 RX ORDER — MEPERIDINE HYDROCHLORIDE 25 MG/ML
12.5 INJECTION INTRAMUSCULAR; INTRAVENOUS; SUBCUTANEOUS
Status: DISCONTINUED | OUTPATIENT
Start: 2017-08-01 | End: 2017-08-01 | Stop reason: HOSPADM

## 2017-08-01 RX ORDER — ONDANSETRON 4 MG/1
4 TABLET, ORALLY DISINTEGRATING ORAL EVERY 30 MIN PRN
Status: DISCONTINUED | OUTPATIENT
Start: 2017-08-01 | End: 2017-08-01 | Stop reason: HOSPADM

## 2017-08-01 RX ORDER — ONDANSETRON 2 MG/ML
4 INJECTION INTRAMUSCULAR; INTRAVENOUS EVERY 30 MIN PRN
Status: DISCONTINUED | OUTPATIENT
Start: 2017-08-01 | End: 2017-08-01 | Stop reason: HOSPADM

## 2017-08-01 RX ORDER — PROPOFOL 10 MG/ML
INJECTION, EMULSION INTRAVENOUS CONTINUOUS PRN
Status: DISCONTINUED | OUTPATIENT
Start: 2017-08-01 | End: 2017-08-01

## 2017-08-01 RX ADMIN — MIDAZOLAM HYDROCHLORIDE 1 MG: 1 INJECTION, SOLUTION INTRAMUSCULAR; INTRAVENOUS at 10:31

## 2017-08-01 RX ADMIN — Medication 2 ML: at 10:30

## 2017-08-01 RX ADMIN — PROPOFOL 20 MG: 10 INJECTION, EMULSION INTRAVENOUS at 10:42

## 2017-08-01 RX ADMIN — SODIUM CHLORIDE, POTASSIUM CHLORIDE, SODIUM LACTATE AND CALCIUM CHLORIDE: 600; 310; 30; 20 INJECTION, SOLUTION INTRAVENOUS at 09:01

## 2017-08-01 RX ADMIN — MIDAZOLAM HYDROCHLORIDE 1 MG: 1 INJECTION, SOLUTION INTRAMUSCULAR; INTRAVENOUS at 10:38

## 2017-08-01 RX ADMIN — FENTANYL CITRATE 25 MCG: 50 INJECTION, SOLUTION INTRAMUSCULAR; INTRAVENOUS at 10:38

## 2017-08-01 RX ADMIN — BENZOCAINE 1 EACH: 220 SPRAY, METERED PERIODONTAL at 10:31

## 2017-08-01 RX ADMIN — PROPOFOL 10 MG: 10 INJECTION, EMULSION INTRAVENOUS at 10:52

## 2017-08-01 RX ADMIN — Medication 0.1 MG: at 10:31

## 2017-08-01 RX ADMIN — PROPOFOL 100 MCG/KG/MIN: 10 INJECTION, EMULSION INTRAVENOUS at 10:38

## 2017-08-01 NOTE — ANESTHESIA POSTPROCEDURE EVALUATION
Patient: Chantell Kidd    Procedure(s):  Colonoscopy and upper endoscopy - Wound Class: II-Clean Contaminated   - Wound Class: II-Clean Contaminated    Diagnosis:Colonoscopy & EGD w MAC w Sean/DX:Nausea with vomiting, Abdominal pain, generalized/Golytely/egd& pre-op info mailed-Can be scheduled w/any provider per Sean  Diagnosis Additional Information: No value filed.    Anesthesia Type:  MAC    Note:  Anesthesia Post Evaluation    Patient location during evaluation: PACU  Patient participation: Able to fully participate in evaluation  Level of consciousness: awake  Pain management: adequate  Airway patency: patent  Cardiovascular status: acceptable  Respiratory status: acceptable  PONV: none     Anesthetic complications: None          Last vitals:  Vitals:    08/01/17 1150 08/01/17 1151 08/01/17 1152   BP: 104/72     Resp: 29 11 14   Temp:      SpO2: 100% 100% 100%         Electronically Signed By: Yaakov Villarreal MD  August 1, 2017  12:02 PM

## 2017-08-01 NOTE — PROGRESS NOTES
GI NOTE       HPI:  Patient seen and examined.   Other than replacement of G-J tube in June, no changes since 6/10/17 ED visit.     PROBLEM LIST  Patient Active Problem List    Diagnosis Date Noted     Nausea 11/17/2016     Priority: Medium     Adrenal insufficiency (H) 09/15/2016     Priority: Medium     S/P hernia repair 09/15/2016     Priority: Medium     Anemia, iron deficiency 08/31/2016     Priority: Medium     Iron deficiency 08/09/2016     Priority: Medium     Odynophagia 08/02/2016     Priority: Medium     Decreased oral intake 07/06/2016     Priority: Medium     Mood disorder due to a general medical condition 03/29/2016     Priority: Medium     Hypoglycemia 03/15/2016     Priority: Medium     Hypothyroidism 04/23/2015     Priority: Medium     Exocrine pancreatic insufficiency 03/05/2015     Priority: Medium     CMC DJD(carpometacarpal degenerative joint disease), localized primary 02/12/2015     Priority: Medium     CIERRA (generalized anxiety disorder) 08/06/2014     Priority: Medium     Major depressive disorder, recurrent episode, moderate (H) 08/06/2014     Priority: Medium     Abdominal muscle strain 08/05/2014     Priority: Medium     Migraine 07/16/2014     Priority: Medium     Problem list name updated by automated process. Provider to review       Type 1 diabetes mellitus (H) 12/17/2013     Priority: Medium     Problem list name updated by automated process. 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 01/16/2012     Priority: Medium       PERTINENT  "MEDICATIONS:  Current Facility-Administered Medications   Medication     lactated ringers infusion     ondansetron (ZOFRAN-ODT) ODT tab 4 mg    Or     ondansetron (ZOFRAN) injection 4 mg     prochlorperazine (COMPAZINE) injection 5-10 mg     meperidine (DEMEROL) injection 12.5 mg     naloxone (NARCAN) injection 0.1-0.4 mg         PHYSICAL EXAMINATION:  Vitals /73  Temp 97.7  F (36.5  C) (Oral)  Resp 16  Ht 1.575 m (5' 2\")  Wt 63.9 kg (140 lb 12.8 oz)  SpO2 100%  BMI 25.75 kg/m2   Wt   Wt Readings from Last 2 Encounters:   08/01/17 63.9 kg (140 lb 12.8 oz)   06/10/17 59.9 kg (132 lb)   Gen: Pt sitting up on exam table in NAD, interactive and cooperative on exam  Eyes: sclerae anicteric, no injection  ENT:  OP clear, MMM  Cardiac: RRR, nl S1, S2, radial pulses +2 b/l, no peripheral edema  Resp: Clear bilaterally  GI: Normoactive BS, abd soft, flat, mild tenderness in RUQ/epigastric region  Skin: Warm, dry, no jaundice, nails appear healthy  Lymph: no submandibular, no cervical, and no supraclavicular LAD  Neuro: alert, oriented, answers questions appropriately      PERTINENT STUDIES:    Orders Only on 04/04/2017   Component Date Value Ref Range Status     WBC 04/04/2017 8.0  4.0 - 11.0 10e9/L Final     RBC Count 04/04/2017 3.97  3.8 - 5.2 10e12/L Final     Hemoglobin 04/04/2017 12.0  11.7 - 15.7 g/dL Final     Hematocrit 04/04/2017 36.8  35.0 - 47.0 % Final     MCV 04/04/2017 93  78 - 100 fl Final     MCH 04/04/2017 30.2  26.5 - 33.0 pg Final     MCHC 04/04/2017 32.6  31.5 - 36.5 g/dL Final     RDW 04/04/2017 13.2  10.0 - 15.0 % Final     Platelet Count 04/04/2017 452* 150 - 450 10e9/L Final     Diff Method 04/04/2017 Automated Method   Final     % Neutrophils 04/04/2017 52.2  % Final     % Lymphocytes 04/04/2017 38.5  % Final     % Monocytes 04/04/2017 6.0  % Final     % Eosinophils 04/04/2017 1.9  % Final     % Basophils 04/04/2017 1.0  % Final     % Immature Granulocytes 04/04/2017 0.4  % Final     " Nucleated RBCs 04/04/2017 0  0 /100 Final     Absolute Neutrophil 04/04/2017 4.2  1.6 - 8.3 10e9/L Final     Absolute Lymphocytes 04/04/2017 3.1  0.8 - 5.3 10e9/L Final     Absolute Monocytes 04/04/2017 0.5  0.0 - 1.3 10e9/L Final     Absolute Eosinophils 04/04/2017 0.2  0.0 - 0.7 10e9/L Final     Absolute Basophils 04/04/2017 0.1  0.0 - 0.2 10e9/L Final     Abs Immature Granulocytes 04/04/2017 0.0  0 - 0.4 10e9/L Final     Absolute Nucleated RBC 04/04/2017 0.0   Final

## 2017-08-01 NOTE — ANESTHESIA CARE TRANSFER NOTE
Patient: Chantell Kidd    Procedure(s):  Colonoscopy and upper endoscopy - Wound Class: II-Clean Contaminated   - Wound Class: II-Clean Contaminated    Diagnosis: Colonoscopy & EGD w MAC w Sean/DX:Nausea with vomiting, Abdominal pain, generalized/Golytely/egd& pre-op info mailed-Can be scheduled w/any provider per Sean  Diagnosis Additional Information: No value filed.    Anesthesia Type:   MAC     Note:  Airway :Room Air  Destination: endoscopy recovery.        Vitals: (Last set prior to Anesthesia Care Transfer)    CRNA VITALS  8/1/2017 1108 - 8/1/2017 1144      8/1/2017             Pulse: 64    Ht Rate: 62    SpO2: 100 %                Electronically Signed By: BEE Plasencia CRNA  August 1, 2017  11:44 AM

## 2017-08-01 NOTE — DISCHARGE INSTRUCTIONS
Discharge Instructions after  Upper Endoscopy (EGD)    Activity and Diet  You were given medicine for pain. You may be dizzy or sleepy.  For 24 hours:    Do not drive or use heavy equipment.    Do not make important decisions.    Do not drink any alcohol.    You may return to your regular diet.    Discomfort  You may have a sore throat for 2 to 3 days. It may help to:    Avoid hot liquids for 24 hours.    Use sore throat lozenges.    Gargle as needed with salt water up to 4 times a day. Mix 1 cup of warm water  with 1 teaspoon of salt. Do not swallow.      You may take Tylenol (acetaminophen) for pain unless your doctor has told you not to.    Follow-up    Call your Primary Care Provider if you have any ongoing or new questions or problems    When to call us:  Problems are rare. Call right away if you have:    Unusual throat pain or trouble swallowing    Unusual pain in belly or chest that is not relieved by belching or passing air    Black stools (tar-like looking bowel movement)    Temperature above 100.6  F. (37.5  C).    If you vomit blood or have severe pain, go to an emergency room.    If you have questions, call:  Monday to Friday, 7 a.m. to 4:30 p.m.: Endoscopy: [phone] (We may have to call you back)    After hours: Hospital: [phone] (Ask for the GI fellow on call)    Discharge Instructions after Colonoscopy  or Sigmoidoscopy    Today you had a Colonoscopy     Activity and Diet  You were given medicine for pain. You may be dizzy or sleepy.  For 24 hours:    Do not drive or use heavy equipment.    Do not make important decisions.    Do not drink any alcohol.    You may return to your normal diet and medicines.    Discomfort    Air was placed in your colon during the exam in order to see it. Walking helps to pass the air.    You may take Tylenol (acetaminophen) for pain unless your doctor has told you not to.    Do not take aspirin or ibuprofen (Advil, Motrin, or other anti-inflammatory  drugs) for 4  days.    Follow-up    Call your Primary Care Provider if you have any ongoing or new questions or problems    When to call:    Call right away if you have:    Unusual pain in belly or chest pain not relieved with passing air.    More than 1 to 2 Tablespoons of bleeding from your rectum.    Fever above 100.6  F (37.5  C).    If you have severe pain, bleeding, or shortness of breath, go to an emergency room.    If you have questions, call:  Monday to Friday, 7 a.m. to 4:30 p.m.  Endoscopy: [phone] (We may have to call you back)    After hours  Hospital: [phone] (Ask for the GI fellow on call)

## 2017-08-01 NOTE — ANESTHESIA PREPROCEDURE EVALUATION
Anesthesia Evaluation         Anesthesia Plan      History & Physical Review  History and physical reviewed and following examination; no interval change.    ASA Status:  3 .    NPO Status:  > 6 hours    Plan for MAC with Intravenous induction. Maintenance will be TIVA.  Reason for MAC:  Difficulty with conscious sedation (QS)         Postoperative Care      Consents  Anesthetic plan, risks, benefits and alternatives discussed with:  Patient..          ANESTHESIA PREOP EVALUATION    NPO Status: Yes, NPO  Procedure: Procedure(s):  Colonoscopy and upper endoscopy - Wound Class: II-Clean Contaminated   - Wound Class: II-Clean Contaminated    HPI: Presents for EGD AND COLONOSCOPY     PMHx/PSHx/ROS:  PAST MEDICAL HISTORY:   Past Medical History:   Diagnosis Date     Chronic abdominal pain      Chronic pancreatitis (H)     S/P pancreatectomy     Depression with anxiety      Diabetes mellitus (H) 1/2012     Gastro-oesophageal reflux disease      Hypothyroidism 4/23/2015     Kidney stones      Low serum cortisol level (H)      Migraines      Other chronic pain     STOMACH     Other chronic pain     LUMBAR SPINE     Spasm of sphincter of Oddi        PAST SURGICAL HISTORY:   Past Surgical History:   Procedure Laterality Date     ARTHROPLASTY CARPOMETACARPAL (THUMB JOINT)  5/2/2014    Procedure: ARTHROPLASTY CARPOMETACARPAL (THUMB JOINT);  Surgeon: Carina Panda MD;  Location: [facility]     CHOLECYSTECTOMY  2004     COLONOSCOPY  7/18/2014    Procedure: COLONOSCOPY;  Surgeon: Aurora Sahu MD;  Location: [facility]     ENDOSCOPIC RETROGRADE CHOLANGIOPANCREATOGRAM       ENDOSCOPIC RETROGRADE CHOLANGIOPANCREATOGRAM  4/19/2011    Procedure:ENDOSCOPIC RETROGRADE CHOLANGIOPANCREATOGRAM; Pancreatic Stent Placement       ENDOSCOPIC RETROGRADE CHOLANGIOPANCREATOGRAM  5/26/2011    Procedure:ENDOSCOPIC RETROGRADE CHOLANGIOPANCREATOGRAM; with Pancreatic Stent Removal; Surgeon:DALE MIMS; Location:[facility] OR      ENDOSCOPY UPPER, COLONOSCOPY, COMBINED  4/25/2012    Procedure:COMBINED ENDOSCOPY UPPER, COLONOSCOPY; Enteroscopy with Bile Duct Stent Removal, Colonoscopy  *Latex Safe Room*; Surgeon:GRACY GODWIN; Location:[facility]     ESOPHAGOSCOPY, GASTROSCOPY, DUODENOSCOPY (EGD), COMBINED  5/26/2011    Procedure:COMBINED ESOPHAGOSCOPY, GASTROSCOPY, DUODENOSCOPY (EGD); Surgeon:DALE MIMS; Location[facility]     ESOPHAGOSCOPY, GASTROSCOPY, DUODENOSCOPY (EGD), COMBINED N/A 10/30/2014    Procedure: COMBINED ESOPHAGOSCOPY, GASTROSCOPY, DUODENOSCOPY (EGD), BIOPSY SINGLE OR MULTIPLE;  Surgeon: Sarai Moon MD;  Location: [facility]     ESOPHAGOSCOPY, GASTROSCOPY, DUODENOSCOPY (EGD), COMBINED Left 7/6/2015    Procedure: COMBINED ESOPHAGOSCOPY, GASTROSCOPY, DUODENOSCOPY (EGD), BIOPSY SINGLE OR MULTIPLE;  Surgeon: Thomas Estrada MD;  Location: [facility]     ESOPHAGOSCOPY, GASTROSCOPY, DUODENOSCOPY (EGD), COMBINED N/A 7/8/2016    Procedure: COMBINED ESOPHAGOSCOPY, GASTROSCOPY, DUODENOSCOPY (EGD), BIOPSY SINGLE OR MULTIPLE;  Surgeon: Eloy Klein MD;  [facility]     ESOPHAGOSCOPY, GASTROSCOPY, DUODENOSCOPY (EGD), COMBINED N/A 8/4/2016    Procedure: COMBINED ESOPHAGOSCOPY, GASTROSCOPY, DUODENOSCOPY (EGD), BIOPSY SINGLE OR MULTIPLE;  Surgeon: Jason Brown MD;  Location:  GI     GYN SURGERY      Hysterectomy and USO     [facility] ENDOSCOPY W EUS  7/20/2011    Procedure:COMBINED ENDOSCOPIC ULTRASOUND, ESOPHAGOSCOPY, GASTROSCOPY, DUODENOSCOPY (EGD); Surgeon:DARVIN DONOHUE; Location: GI     HERNIORRHAPHY VENTRAL N/A 9/15/2016    Procedure: HERNIORRHAPHY VENTRAL;  Surgeon: Juanita Bernabe MD;  [facility]     HYSTERECTOMY  1997 or 1998    USO     INCISION AND DRAINAGE ABDOMEN WASHOUT, COMBINED  8/16/2012    Procedure: COMBINED INCISION AND DRAINAGE ABDOMEN WASHOUT;  ,debridement and Drainage Post Appendectomy;  Surgeon: Ron Austin MD;  [facility]: [facility]     INJECT  TRANSVERSUS ABDOMINIS PLANE (TAP) BLOCK BILATERAL Bilateral 5/26/2016    Procedure: INJECT TRANSVERSUS ABDOMINIS PLANE (TAP) BLOCK BILATERAL;  Surgeon: Leonard Mccallum MD;  [facility]     LAPAROSCOPIC APPENDECTOMY  7/30/2012    Procedure: LAPAROSCOPIC APPENDECTOMY;  Open Appendectomy;  Surgeon: Ron Austin MD;  [facility]: [facility]     PANCREATECTOMY, TRANSPLANT AUTO ISLET CELL, COMBINED  1/6/2012    Procedure:COMBINED PANCREATECTOMY, TRANSPLANT AUTO ISLET CELL; Total  Pancreatectomy, Auto Islet Transplant, splenectomy, 18fr. transgastric-jejunal feeding tube placement, liver biopsy; Surgeon:PALAK LEE; Location:[facility]     SPLENECTOMY         FAMILY HISTORY:   Family History   Problem Relation Age of Onset     Hypertension Mother      DIABETES Mother      OSTEOPOROSIS Mother      CANCER Father      pancreatic cancer     DIABETES Maternal Grandmother      Cardiovascular Maternal Grandmother      CANCER Maternal Grandfather      lung cancer     CANCER Sister      brain     CANCER Sister      liver cancer         Past Anes Hx: No personal or family h/o anesthesia problems    Soc Hx:   Tobacco:   EtOH:     Allergies:   Allergies   Allergen Reactions     Corticosteroids Other (See Comments)     All oral,IV and injectable steroids are contraindicated (unless in life threatening situations) in Islet Auto transplant recipients. They can cause irreversible loss of islet cell function. Please contact patients transplant care coordinator Malini ORDAZ @[phone]/Pager [phone]  and Endocrinologist prior to administration.     Chocolate Flavor Rash     Breaks out when eats chocolate       Meds:   Prescriptions Prior to Admission   Medication Sig Dispense Refill Last Dose     traZODone (DESYREL) 100 MG tablet Take 1-2 tablets (100-200 mg) by mouth At Bedtime *AN HOUR BEFORE* 100 tablet 1 Past Week     docusate sodium (DOK) 100 MG capsule Take 1 capsule (100 mg) by mouth daily as needed for  constipation 120 capsule 0 Past Week     alum & mag hydroxide-simethicone (GELUSIL) 200-200-25 MG CHEW chewable tablet CHEW AND SWALLOW 2 CHEWS EVERY 4 HOURS AS NEEDED FOR INDIGESTION 100 tablet 1 Past Week     linaclotide (LINZESS) 145 MCG capsule Take 1 capsule (145 mcg) by mouth every morning (before breakfast) 30 capsule 1 Past Week     potassium chloride SA (K-DUR/KLOR-CON M) 20 MEQ CR tablet Take 1 tablet (20 mEq) by mouth daily 30 tablet 0 Past Week     dronabinol (MARINOL) 2.5 MG capsule Take 2 capsules (5 mg) by mouth 2 times daily as needed 56 capsule 0 Past Week     hydrocortisone (CORTEF) 10 MG tablet Take 10 mg in the morning and 10 mg at bedtime. Watch for hypoglycemia recurrence. 60 tablet 3 7/31/2017     DULoxetine (CYMBALTA) 30 MG EC capsule Take 1 capsule (30 mg) by mouth daily With 60mg capsule for total dose of 90mg 90 capsule 1 Past Week     DULoxetine (CYMBALTA) 60 MG EC capsule Take 1 capsule (60 mg) by mouth daily With 30mg capsule for total dose of 90mg 90 capsule 1 Past Week     senna (SENNA LAX) 8.6 MG tablet Take 1-2 tablets by mouth daily as needed for constipation 90 tablet 1 Past Week     fluconazole (DIFLUCAN) 200 MG tablet Take 2 tabs the first day and 1 tab for the next 13 days 15 tablet 0 7/31/2017     sodium bicarbonate 650 MG tablet Take one-half tablet (325 mg), via feeding tube every 4 hours. 135 tablet 3 Past Week     ondansetron (ZOFRAN-ODT) 4 MG ODT tab DISSOLVE ONE TABLET ON THE TONGUE EVERY 6 HOURS AS NEEDED FOR NAUSEA 60 tablet 1 Past Week     dicyclomine (BENTYL) 10 MG capsule TAKE ONE CAPSULE BY MOUTH EVERY 6 HOURS AS NEEDED 40 capsule 3 7/31/2017     metoclopramide (REGLAN) 5 MG tablet Take 2 tablets (10 mg) by mouth 3 times daily (before meals 100 tablet 6 Past Week     amylase-lipase-protease (CREON 12) 39264 UNITS CPEP Take 6 capsules with meals and 3 with snacks.  This is up to 24 capsules per day. 2160 capsule 3 Past Week     furosemide (LASIX) 20 MG tablet Take 1  tablet (20 mg) by mouth 2 times daily 180 tablet 2 7/31/2017     pregabalin (LYRICA) 150 MG capsule Take 1 capsule (150 mg) by mouth 3 times daily 90 capsule 5 Past Week     cyclobenzaprine (FLEXERIL) 5 MG tablet Take 1 tablet (5 mg) by mouth 3 times daily as needed for muscle spasms 42 tablet 3 Past Week     levothyroxine (SYNTHROID/LEVOTHROID) 112 MCG tablet Take 1 tablet (112 mcg) by mouth daily 90 tablet 3 7/31/2017     morphine 0.1% in solosite Place 1 g onto the skin 4 times daily as needed for pain 25 g 0 Past Week     nystatin (MYCOSTATIN) 47894 unit/0.5mL SUSP Take 1 mL (100,000 Units) by mouth 4 times daily 60 mL 1 Past Week     pantoprazole (PROTONIX) 40 MG enteric coated tablet Take 1 tablet (40 mg) by mouth 2 times daily 180 tablet 3 7/31/2017     topiramate (TOPAMAX) 100 MG tablet Take 1 tablet (100 mg) by mouth 2 times daily 180 tablet 1 Past Week     alendronate (FOSAMAX) 70 MG tablet Take 1 tablet (70 mg) by mouth every 7 days On Sundays take first thing in the morning with plain water and remain upright for at least 30 minutes and until after first food of the day    Do not restart Fosamax (alendronate) until your difficulty swallowing has resolved and you have finished the entire course of fluconazole (Diflucan). 4 tablet 0 Past Week     insulin aspart (NOVOLOG VIAL) 100 UNITS/ML VIAL As directed in pump 36 vial prn 8/1/2017     SUMAtriptan (IMITREX) 50 MG tablet Take 1 tablet (50 mg) by mouth at onset of headache for migraine Take 1 Tab by mouth Once as needed for Migraine Headache. May repeat after two hours.  Maximum dose 200 mg/24 hours. 30 tablet 1 Past Week     acetaminophen 500 MG CAPS Take 1,000 mg by mouth three times a day as needed.   Past Week     diclofenac (VOLTAREN) 1 % GEL 2 g Apply 2 g to skin four times a day as needed (to affected upper extremity joint(s)). Maximum 8g/day per joint, 16g/day total.   Past Week     polyethylene glycol (MIRALAX/GLYCOLAX) packet Take 1 packet by  mouth 2 times daily as needed for constipation  14 each 5 Past Week     blood glucose monitoring (NOEMI CONTOUR NEXT) test strip Use to test blood sugar 8 times daily. 240 each 11 Unknown at Unknown time     Nutritional Supplements (BOOST HIGH PROTEIN) LIQD After above baseline labs are drawn, give: 6 mL/kg to maximum of 360 mL; the beverage is to be consumed within 5 minutes.  0 6/9/2017 at Unknown time     oxyCODONE (ROXICODONE) 5 MG immediate release tablet Take 1 tablet (5 mg) by mouth every 6 hours as needed 50 tablet 0 More than a month     blood glucose monitoring (NO BRAND SPECIFIED) test strip Use to test blood glucoses 6-8 times per day. 240 each 11 Unknown at Unknown time     Nutritional Supplements (BOOST HIGH PROTEIN) LIQD After above baseline labs are drawn, give: 6 mL/kg to maximum of 360 mL; the beverage is to be consumed within 5 minutes.  0 6/9/2017 at Unknown time     Nutritional Supplements (BOOST HIGH PROTEIN) LIQD Also can use Ensure clear (available over the counter)  0 6/9/2017 at Unknown time     aspirin 81 MG tablet Take 81 mg by mouth daily.   7/25/2017     insulin pen needle needle RX# [id number]   Pen Needle UC 31G UF IV Mini   Use 4-8 needles per day for insulin injections.     2 Box 11 Unknown at Unknown time     ACCU-CHEK MULTICLIX LANCETS MISC by Lancet route. Use to test blood sugar daily or as directed. 102 each prn 6/9/2017 at Unknown time       No current outpatient prescriptions on file.       Physical Exam:  VS: T Data Unavailable, P Data Unavailable, BP Data Unavailable, R Data Unavailable, SpO2   Weight   Wt Readings from Last 2 Encounters:   06/10/17 59.9 kg (132 lb)   04/04/17 61.4 kg (135 lb 4.8 oz)         Airway: MP 2, TM>3FB, Neck full ROM  Dentition: no loose teeth  Heart: RRR  Lungs: CTAB      BMP:  Lab Results   Component Value Date     01/19/2017      Lab Results   Component Value Date    POTASSIUM 4.0 01/19/2017     Lab Results   Component Value Date    CHLORIDE  108 01/19/2017     Lab Results   Component Value Date    ELIZA 8.7 01/19/2017     Lab Results   Component Value Date    CO2 27 01/19/2017     Lab Results   Component Value Date    BUN 9 01/19/2017     Lab Results   Component Value Date    CR 0.65 01/19/2017     Lab Results   Component Value Date     01/19/2017        CBC:  Lab Results   Component Value Date    WBC 8.0 04/04/2017     Lab Results   Component Value Date    HGB 12.0 04/04/2017     Lab Results   Component Value Date    HCT 36.8 04/04/2017     Lab Results   Component Value Date     04/04/2017        Coags/Type and Screen  Lab Results   Component Value Date    INR 1.04 11/15/2016     No results found for: PT  Type and Screen:      Assessment/Plan:  - ASA 3  - MAC with standard ASA monitors, IV induction, balanced anesthetic  - PIV  - Antibiotics per surgery  - PONV prophylaxis  - Blood products available, possible administration discussed with patient    Yaakov Villarreal MD    8/1/2017  8:33 AM

## 2017-08-01 NOTE — OR NURSING
Pt here for EGD, Colonoscopy today with MAC sedation.  No H &P found.  Pt states she had H&P 3 wks at [facility] in [place].  Called Valeriano Perez in pts room they stated she did not have H&P.   H&P done with Dr Harris pre procedure.

## 2017-08-01 NOTE — OR NURSING
EGD and colonoscopy completed under MAC sedation.  No interventions.  Anesthesia staff present to administer sedation and monitor patient.  Pt taken to recovery by CRNA.

## 2017-08-01 NOTE — IP AVS SNAPSHOT
[facility], [place], Endoscopy    [address] [address]    Phone:  [phone]                                       After Visit Summary   8/1/2017    Chantell Kidd    MRN: [id number]           After Visit Summary Signature Page     I have received my discharge instructions, and my questions have been answered. I have discussed any challenges I see with this plan with the nurse or doctor.    ..........................................................................................................................................  Patient/Patient Representative Signature      ..........................................................................................................................................  Patient Representative Print Name and Relationship to Patient    ..................................................               ................................................  Date                                            Time    ..........................................................................................................................................  Reviewed by Signature/Title    ...................................................              ..............................................  Date                                                            Time

## 2017-08-01 NOTE — IP AVS SNAPSHOT
MRN:[id number]                      After Visit Summary   8/1/2017    Chantell Kidd    MRN: [id number]           Thank you!     Thank you for choosing [facility] for your care. Our goal is always to provide you with excellent care. Hearing back from our patients is one way we can continue to improve our services. Please take a few minutes to complete the written survey that you may receive in the mail after you visit with us. Thank you!        Patient Information     Date Of Birth          1965        About your hospital stay     You were admitted on:  August 1, 2017 You last received care in the:  [facility], Endoscopy    You were discharged on:  August 1, 2017       Who to Call     For medical emergencies, please call 911.  For non-urgent questions about your medical care, please call your primary care provider or clinic, [phone]  For questions related to your surgery, please call your surgery clinic        Attending Provider     Provider Eloy Leonard MD Gastroenterology       Primary Care Provider Office Phone # Fax #    Ron Kvng Morgan [phone]      Your next 10 appointments already scheduled     Oct 09, 2017  8:00 AM CDT   (Arrive by 7:45 AM)   Return Visit with Thomas Estrada MD   [facility] ([facility])    [address] [place] [id number] [phone]              Further instructions from your care team       Discharge Instructions after  Upper Endoscopy (EGD)    Activity and Diet  You were given medicine for pain. You may be dizzy or sleepy.  For 24 hours:    Do not drive or use heavy equipment.    Do not make important decisions.    Do not drink any alcohol.    You may return to your regular diet.    Discomfort  You may have a sore throat for 2 to 3 days. It may help to:    Avoid hot liquids for 24 hours.    Use sore throat lozenges.    Gargle  as needed with salt water up to 4 times a day. Mix 1 cup of warm water  with 1 teaspoon of salt. Do not swallow.      You may take Tylenol (acetaminophen) for pain unless your doctor has told you not to.    Follow-up    Call your Primary Care Provider if you have any ongoing or new questions or problems    When to call us:  Problems are rare. Call right away if you have:    Unusual throat pain or trouble swallowing    Unusual pain in belly or chest that is not relieved by belching or passing air    Black stools (tar-like looking bowel movement)    Temperature above 100.6  F. (37.5  C).    If you vomit blood or have severe pain, go to an emergency room.    If you have questions, call:  Monday to Friday, 7 a.m. to 4:30 p.m.: Endoscopy: [phone] (We may have to call you back)    After hours: Hospital: [phone] (Ask for the GI fellow on call)    Discharge Instructions after Colonoscopy  or Sigmoidoscopy    Today you had a Colonoscopy     Activity and Diet  You were given medicine for pain. You may be dizzy or sleepy.  For 24 hours:    Do not drive or use heavy equipment.    Do not make important decisions.    Do not drink any alcohol.    You may return to your normal diet and medicines.    Discomfort    Air was placed in your colon during the exam in order to see it. Walking helps to pass the air.    You may take Tylenol (acetaminophen) for pain unless your doctor has told you not to.    Do not take aspirin or ibuprofen (Advil, Motrin, or other anti-inflammatory  drugs) for 4 days.    Follow-up    Call your Primary Care Provider if you have any ongoing or new questions or problems    When to call:    Call right away if you have:    Unusual pain in belly or chest pain not relieved with passing air.    More than 1 to 2 Tablespoons of bleeding from your rectum.    Fever above 100.6  F (37.5  C).    If you have severe pain, bleeding, or shortness of breath, go to an emergency room.    If you have questions,  "call:  Monday to Friday, 7 a.m. to 4:30 p.m.  Endoscopy: [phone] (We may have to call you back)    After hours  Hospital: [phone] (Ask for the GI fellow on call)    Pending Results     No orders found from 7/30/2017 to 8/2/2017.            Admission Information     Date & Time Provider Department Dept. Phone    8/1/2017 Eloy Klein MD [facility], [place], Endoscopy [phone]      Your Vitals Were     Blood Pressure Temperature Respirations Height Weight Pulse Oximetry    117/73 97.7  F (36.5  C) (Oral) 16 1.575 m (5' 2\") 63.9 kg (140 lb 12.8 oz) 100%    BMI (Body Mass Index)                   25.75 kg/m2           MyChart Information     [facility] gives you secure access to your electronic health record. If you see a primary care provider, you can also send messages to your care team and make appointments. If you have questions, please call your primary care clinic.  If you do not have a primary care provider, please call [phone] and they will assist you.        Care EveryWhere ID     This is your Care EveryWhere ID. This could be used by other organizations to access your [place] medical records  [id number]        Equal Access to Services     BRIDGETTE JOHNSON AH: Arslan blacko Sogillali, waaxda luqadaha, qaybta kaalmada adeegyada, bambi flowers. So [facility] [phone].    ATENCIÓN: Si habla español, tiene a webb disposición servicios gratuitos de asistencia lingüística. Llame al [phone].    We comply with applicable federal civil rights laws and [place] laws. We do not discriminate on the basis of race, color, national origin, age, disability sex, sexual orientation or gender identity.               Review of your medicines      UNREVIEWED medicines. Ask your doctor about these medicines        Dose / Directions    acetaminophen 500 MG Caps        Take 1,000 mg by mouth three times a day as needed.   Refills:  0       alendronate 70 MG tablet   Commonly known " as:  FOSAMAX   Used for:  Osteoporosis        Dose:  70 mg   Take 1 tablet (70 mg) by mouth every 7 days On Sundays take first thing in the morning with plain water and remain upright for at least 30 minutes and until after first food of the day  Do not restart Fosamax (alendronate) until your difficulty swallowing has resolved and you have finished the entire course of fluconazole (Diflucan).   Quantity:  4 tablet   Refills:  0       alum & mag hydroxide-simethicone 200-200-25 MG Chew chewable tablet   Commonly known as:  GELUSIL   Used for:  Abdominal pain        CHEW AND SWALLOW 2 CHEWS EVERY 4 HOURS AS NEEDED FOR INDIGESTION   Quantity:  100 tablet   Refills:  1       amylase-lipase-protease 92595 UNITS Cpep   Commonly known as:  CREON 12   Indication:  1-2 capsules with snacks.   Used for:  Exocrine pancreatic insufficiency        Take 6 capsules with meals and 3 with snacks.  This is up to 24 capsules per day.   Quantity:  2160 capsule   Refills:  3       aspirin 81 MG tablet        Take 81 mg by mouth daily.   Refills:  0       * BOOST HIGH PROTEIN Liqd   Used for:  Post-pancreatectomy diabetes (H), Pancreatic insufficiency, Vitamin D deficiency        After above baseline labs are drawn, give: 6 mL/kg to maximum of 360 mL; the beverage is to be consumed within 5 minutes.   Refills:  0       * BOOST HIGH PROTEIN Liqd   Used for:  Pancreatic insufficiency        Also can use Ensure clear (available over the counter)   Refills:  0       * BOOST HIGH PROTEIN Liqd   Used for:  Post-pancreatectomy diabetes (H), Vitamin D deficiency, unspecified        After above baseline labs are drawn, give: 6 mL/kg to maximum of 360 mL; the beverage is to be consumed within 5 minutes.   Refills:  0       cyclobenzaprine 5 MG tablet   Commonly known as:  FLEXERIL   Used for:  Abdominal pain, generalized        Dose:  5 mg   Take 1 tablet (5 mg) by mouth 3 times daily as needed for muscle spasms   Quantity:  42 tablet   Refills:   3       diclofenac 1 % Gel topical gel   Commonly known as:  VOLTAREN        Dose:  2 g   2 g Apply 2 g to skin four times a day as needed (to affected upper extremity joint(s)). Maximum 8g/day per joint, 16g/day total.   Refills:  0       dicyclomine 10 MG capsule   Commonly known as:  BENTYL   Used for:  Abdominal pain, epigastric        TAKE ONE CAPSULE BY MOUTH EVERY 6 HOURS AS NEEDED   Quantity:  40 capsule   Refills:  3       docusate sodium 100 MG capsule   Commonly known as:  DOK   Used for:  AP (abdominal pain)        Dose:  100 mg   Take 1 capsule (100 mg) by mouth daily as needed for constipation   Quantity:  120 capsule   Refills:  0       dronabinol 2.5 MG capsule   Commonly known as:  MARINOL   Used for:  Routine health maintenance        Dose:  5 mg   Take 2 capsules (5 mg) by mouth 2 times daily as needed   Quantity:  56 capsule   Refills:  0       * DULoxetine 30 MG EC capsule   Commonly known as:  CYMBALTA   Used for:  Major depressive disorder, recurrent episode, moderate (H), CIERRA (generalized anxiety disorder)        Dose:  30 mg   Take 1 capsule (30 mg) by mouth daily With 60mg capsule for total dose of 90mg   Quantity:  90 capsule   Refills:  1       * DULoxetine 60 MG EC capsule   Commonly known as:  CYMBALTA   Used for:  Major depressive disorder, recurrent episode, moderate (H), CIERRA (generalized anxiety disorder)        Dose:  60 mg   Take 1 capsule (60 mg) by mouth daily With 30mg capsule for total dose of 90mg   Quantity:  90 capsule   Refills:  1       fluconazole 200 MG tablet   Commonly known as:  DIFLUCAN        Take 2 tabs the first day and 1 tab for the next 13 days   Quantity:  15 tablet   Refills:  0       furosemide 20 MG tablet   Commonly known as:  LASIX   Used for:  Edema, unspecified type        Dose:  20 mg   Take 1 tablet (20 mg) by mouth 2 times daily   Quantity:  180 tablet   Refills:  2       hydrocortisone 10 MG tablet   Commonly known as:  CORTEF   Used for:   Hypoglycemia, Adrenal insufficiency (H)        Take 10 mg in the morning and 10 mg at bedtime. Watch for hypoglycemia recurrence.   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)        As directed in pump   Quantity:  36 vial   Refills:  prn       levothyroxine 112 MCG tablet   Commonly known as:  SYNTHROID/LEVOTHROID   Used for:  Hypothyroidism        Dose:  112 mcg   Take 1 tablet (112 mcg) by mouth daily   Quantity:  90 tablet   Refills:  3       linaclotide 145 MCG capsule   Commonly known as:  LINZESS   Used for:  Constipation, unspecified constipation type, Chronic back pain, unspecified back location, unspecified back pain laterality, Physical deconditioning, Primary insomnia        Dose:  145 mcg   Take 1 capsule (145 mcg) by mouth every morning (before breakfast)   Quantity:  30 capsule   Refills:  1       metoclopramide 5 MG tablet   Commonly known as:  REGLAN   Used for:  Routine health maintenance        Take 2 tablets (10 mg) by mouth 3 times daily (before meals   Quantity:  100 tablet   Refills:  6       morphine 0.1% in solosite topical gel   Used for:  Generalized abdominal pain        Dose:  1 g   Place 1 g onto the skin 4 times daily as needed for pain   Quantity:  25 g   Refills:  0       nystatin 922793 unit/mL Susp suspension   Commonly known as:  MYCOSTATIN   Used for:  Odynophagia        Dose:  095301 Units   Take 1 mL (100,000 Units) by mouth 4 times daily   Quantity:  60 mL   Refills:  1       ondansetron 4 MG ODT tab   Commonly known as:  ZOFRAN-ODT   Used for:  Nausea        DISSOLVE ONE TABLET ON THE TONGUE EVERY 6 HOURS AS NEEDED FOR NAUSEA   Quantity:  60 tablet   Refills:  1       oxyCODONE 5 MG IR tablet   Commonly known as:  ROXICODONE   Used for:  S/P hernia repair        Dose:  5 mg   Take 1 tablet (5 mg) by mouth every 6 hours as needed   Quantity:  50  tablet   Refills:  0       pantoprazole 40 MG EC tablet   Commonly known as:  PROTONIX   Used for:  H. pylori infection        Dose:  40 mg   Take 1 tablet (40 mg) by mouth 2 times daily   Quantity:  180 tablet   Refills:  3       polyethylene glycol Packet   Commonly known as:  MIRALAX/GLYCOLAX   Used for:  Chronic constipation        Dose:  1 packet   Take 1 packet by mouth 2 times daily as needed for constipation   Quantity:  14 each   Refills:  5       potassium chloride SA 20 MEQ CR tablet   Commonly known as:  K-DUR/KLOR-CON M   Used for:  Hypokalemia        Dose:  20 mEq   Take 1 tablet (20 mEq) by mouth daily   Quantity:  30 tablet   Refills:  0       pregabalin 150 MG capsule   Commonly known as:  LYRICA   Used for:  Chronic generalized abdominal pain        Dose:  150 mg   Take 1 capsule (150 mg) by mouth 3 times daily   Quantity:  90 capsule   Refills:  5       senna 8.6 MG tablet   Commonly known as:  SENNA LAX   Used for:  Other constipation        Dose:  1-2 tablet   Take 1-2 tablets by mouth daily as needed for constipation   Quantity:  90 tablet   Refills:  1       sodium bicarbonate 650 MG tablet   Used for:  Malnutrition (H)        Take one-half tablet (325 mg), via feeding tube every 4 hours.   Quantity:  135 tablet   Refills:  3       SUMAtriptan 50 MG tablet   Commonly known as:  IMITREX   Used for:  Migraine        Dose:  50 mg   Take 1 tablet (50 mg) by mouth at onset of headache for migraine Take 1 Tab by mouth Once as needed for Migraine Headache. May repeat after two hours.  Maximum dose 200 mg/24 hours.   Quantity:  30 tablet   Refills:  1       topiramate 100 MG tablet   Commonly known as:  TOPAMAX   Used for:  Migraine, unspecified, not intractable, without status migrainosus        Dose:  100 mg   Take 1 tablet (100 mg) by mouth 2 times daily   Quantity:  180 tablet   Refills:  1       traZODone 100 MG tablet   Commonly known as:  DESYREL   Used for:  Primary insomnia, Constipation,  unspecified constipation type, Chronic back pain, unspecified back location, unspecified back pain laterality, Physical deconditioning        Dose:  100-200 mg   Take 1-2 tablets (100-200 mg) by mouth At Bedtime *AN HOUR BEFORE*   Quantity:  100 tablet   Refills:  1       * Notice:  This list has 5 medication(s) that are the same as other medications prescribed for you. Read the directions carefully, and ask your doctor or other care provider to review them with you.      CONTINUE these medicines which have NOT CHANGED        Dose / Directions    blood glucose monitoring lancets   Used for:  Chronic abdominal pain        by Lancet route. Use to test blood sugar daily or as directed.   Quantity:  102 each   Refills:  prn       * blood glucose monitoring test strip   Commonly known as:  no brand specified   Used for:  Post-pancreatectomy diabetes (H)        Use to test blood glucoses 6-8 times per day.   Quantity:  240 each   Refills:  11       * blood glucose monitoring test strip   Commonly known as:  NOEMI CONTOUR NEXT   Used for:  Post-pancreatectomy diabetes (H)        Use to test blood sugar 8 times daily.   Quantity:  240 each   Refills:  11       insulin pen needle 31G X 5 MM   Used for:  Chronic abdominal pain        RX# [id number]  Pen Needle UC 31G UF IV Mini  Use 4-8 needles per day for insulin injections.   Quantity:  2 Box   Refills:  11       * Notice:  This list has 2 medication(s) that are the same as other medications prescribed for you. Read the directions carefully, and ask your doctor or other care provider to review them with you.             Protect others around you: Learn how to safely use, store and throw away your medicines at www.disposemymeds.org.             Medication List: This is a list of all your medications and when to take them. Check marks below indicate your daily home schedule. Keep this list as a reference.      Medications           Morning Afternoon Evening Bedtime As Needed     acetaminophen 500 MG Caps   Take 1,000 mg by mouth three times a day as needed.                                alendronate 70 MG tablet   Commonly known as:  FOSAMAX   Take 1 tablet (70 mg) by mouth every 7 days On Sundays take first thing in the morning with plain water and remain upright for at least 30 minutes and until after first food of the day  Do not restart Fosamax (alendronate) until your difficulty swallowing has resolved and you have finished the entire course of fluconazole (Diflucan).                                alum & mag hydroxide-simethicone 200-200-25 MG Chew chewable tablet   Commonly known as:  GELUSIL   CHEW AND SWALLOW 2 CHEWS EVERY 4 HOURS AS NEEDED FOR INDIGESTION                                amylase-lipase-protease 33640 UNITS Cpep   Commonly known as:  CREON 12   Take 6 capsules with meals and 3 with snacks.  This is up to 24 capsules per day.                                aspirin 81 MG tablet   Take 81 mg by mouth daily.                                blood glucose monitoring lancets   by Lancet route. Use to test blood sugar daily or as directed.                                * blood glucose monitoring test strip   Commonly known as:  no brand specified   Use to test blood glucoses 6-8 times per day.                                * blood glucose monitoring test strip   Commonly known as:  NOEMI CONTOUR NEXT   Use to test blood sugar 8 times daily.                                * BOOST HIGH PROTEIN Liqd   After above baseline labs are drawn, give: 6 mL/kg to maximum of 360 mL; the beverage is to be consumed within 5 minutes.                                * BOOST HIGH PROTEIN Liqd   Also can use Ensure clear (available over the counter)                                * BOOST HIGH PROTEIN Liqd   After above baseline labs are drawn, give: 6 mL/kg to maximum of 360 mL; the beverage is to be consumed within 5 minutes.                                cyclobenzaprine 5 MG tablet    Commonly known as:  FLEXERIL   Take 1 tablet (5 mg) by mouth 3 times daily as needed for muscle spasms                                diclofenac 1 % Gel topical gel   Commonly known as:  VOLTAREN   2 g Apply 2 g to skin four times a day as needed (to affected upper extremity joint(s)). Maximum 8g/day per joint, 16g/day total.                                dicyclomine 10 MG capsule   Commonly known as:  BENTYL   TAKE ONE CAPSULE BY MOUTH EVERY 6 HOURS AS NEEDED                                docusate sodium 100 MG capsule   Commonly known as:  DOK   Take 1 capsule (100 mg) by mouth daily as needed for constipation                                dronabinol 2.5 MG capsule   Commonly known as:  MARINOL   Take 2 capsules (5 mg) by mouth 2 times daily as needed                                * DULoxetine 30 MG EC capsule   Commonly known as:  CYMBALTA   Take 1 capsule (30 mg) by mouth daily With 60mg capsule for total dose of 90mg                                * DULoxetine 60 MG EC capsule   Commonly known as:  CYMBALTA   Take 1 capsule (60 mg) by mouth daily With 30mg capsule for total dose of 90mg                                fluconazole 200 MG tablet   Commonly known as:  DIFLUCAN   Take 2 tabs the first day and 1 tab for the next 13 days                                furosemide 20 MG tablet   Commonly known as:  LASIX   Take 1 tablet (20 mg) by mouth 2 times daily                                hydrocortisone 10 MG tablet   Commonly known as:  CORTEF   Take 10 mg in the morning and 10 mg at bedtime. Watch for hypoglycemia recurrence.                                insulin aspart 100 UNITS/ML injection   Commonly known as:  NovoLOG VIAL   As directed in pump                                insulin pen needle 31G X 5 MM   RX# [id number]  Pen Needle UC 31G UF IV Mini  Use 4-8 needles per day for insulin injections.                                levothyroxine 112 MCG tablet   Commonly known as:  SYNTHROID/LEVOTHROID    Take 1 tablet (112 mcg) by mouth daily                                linaclotide 145 MCG capsule   Commonly known as:  LINZESS   Take 1 capsule (145 mcg) by mouth every morning (before breakfast)                                metoclopramide 5 MG tablet   Commonly known as:  REGLAN   Take 2 tablets (10 mg) by mouth 3 times daily (before meals                                morphine 0.1% in solosite topical gel   Place 1 g onto the skin 4 times daily as needed for pain                                nystatin 020608 unit/mL Susp suspension   Commonly known as:  MYCOSTATIN   Take 1 mL (100,000 Units) by mouth 4 times daily                                ondansetron 4 MG ODT tab   Commonly known as:  ZOFRAN-ODT   DISSOLVE ONE TABLET ON THE TONGUE EVERY 6 HOURS AS NEEDED FOR NAUSEA                                oxyCODONE 5 MG IR tablet   Commonly known as:  ROXICODONE   Take 1 tablet (5 mg) by mouth every 6 hours as needed                                pantoprazole 40 MG EC tablet   Commonly known as:  PROTONIX   Take 1 tablet (40 mg) by mouth 2 times daily                                polyethylene glycol Packet   Commonly known as:  MIRALAX/GLYCOLAX   Take 1 packet by mouth 2 times daily as needed for constipation                                potassium chloride SA 20 MEQ CR tablet   Commonly known as:  K-DUR/KLOR-CON M   Take 1 tablet (20 mEq) by mouth daily                                pregabalin 150 MG capsule   Commonly known as:  LYRICA   Take 1 capsule (150 mg) by mouth 3 times daily                                senna 8.6 MG tablet   Commonly known as:  SENNA LAX   Take 1-2 tablets by mouth daily as needed for constipation                                sodium bicarbonate 650 MG tablet   Take one-half tablet (325 mg), via feeding tube every 4 hours.                                SUMAtriptan 50 MG tablet   Commonly known as:  IMITREX   Take 1 tablet (50 mg) by mouth at onset of headache for migraine  Take 1 Tab by mouth Once as needed for Migraine Headache. May repeat after two hours.  Maximum dose 200 mg/24 hours.                                topiramate 100 MG tablet   Commonly known as:  TOPAMAX   Take 1 tablet (100 mg) by mouth 2 times daily                                traZODone 100 MG tablet   Commonly known as:  DESYREL   Take 1-2 tablets (100-200 mg) by mouth At Bedtime *AN HOUR BEFORE*                                * Notice:  This list has 7 medication(s) that are the same as other medications prescribed for you. Read the directions carefully, and ask your doctor or other care provider to review them with you.

## 2017-08-02 ENCOUNTER — CARE COORDINATION (OUTPATIENT)
Dept: GASTROENTEROLOGY | Facility: CLINIC | Age: 52
End: 2017-08-02

## 2017-08-02 DIAGNOSIS — Z97.8 USES FEEDING TUBE: Primary | ICD-10-CM

## 2017-08-02 NOTE — PROGRESS NOTES
Called interventional radiology and talked to Shasha about pt's gj tube and placement and need for repositioned.  Found on EGD yesterday. Interventional Radiology will reach out to pt to schedule    The J-extension of the G-J tube was seen to be coiled in the jejunum,        just beyond the pylorus.        The examined jejunum was normal

## 2017-08-29 ENCOUNTER — TELEPHONE (OUTPATIENT)
Dept: INTERVENTIONAL RADIOLOGY/VASCULAR | Facility: CLINIC | Age: 52
End: 2017-08-29

## 2017-08-29 ENCOUNTER — ANESTHESIA EVENT (OUTPATIENT)
Dept: SURGERY | Facility: AMBULATORY SURGERY CENTER | Age: 52
End: 2017-08-29

## 2017-08-29 NOTE — TELEPHONE ENCOUNTER
Patient no showed for surgery appt today. Was to get GJ check/change.    Surgery pre RN called patient this am after no show and was told by patient that someone from the [facility] called her yesterday and cancelled appt. Told her someone would call her to reschedule.    I called patient this hour. She told me the same. There is no reason documented in her chart as to cxl appt. I don't know by whom or why call was made. I apologized to patient and asked her to call IR scheduling to reschedule while I look into why her appt was cxld.    She states GJ does work, however it seems a bit plugged and she has some pain associated with it. It was seen coiled on EGD around 8/2/17, initially placed about 11/2016, and changed once after placement due to falling out.

## 2017-08-29 NOTE — ANESTHESIA PREPROCEDURE EVALUATION
Physical Exam      Airway   Mallampati: III  TM distance: >3 FB  Neck ROM: full    Dental   (+) chipped    Cardiovascular   Rhythm and rate: regular and normal  (-) carotid bruit is not present and no peripheral edema    Pulmonary    breath sounds clear to auscultation                    Anesthesia Plan      History & Physical Review  History and physical reviewed and following examination; no interval change.    ASA Status:  3 .    NPO Status:  > 8 hours    Plan for MAC with Propofol and Intravenous induction. Maintenance will be TIVA.  Reason for MAC:  Deep or markedly invasive procedure (G8)  PONV prophylaxis:  Ondansetron (or other 5HT-3)       Postoperative Care  Postoperative pain management:  IV analgesics, Oral pain medications and Multi-modal analgesia.      Consents  Anesthetic plan, risks, benefits and alternatives discussed with:  Patient..            ANESTHESIA PREOP EVALUATION    PROCEDURE: Procedure(s):  GJ Tube Change - Wound Class:     HPI: Chantell Kidd is a 51 year old female who presents for above procedure.    PAST MEDICAL HISTORY:    Past Medical History:   Diagnosis Date     Chronic abdominal pain      Chronic pancreatitis (H)     S/P pancreatectomy     Depression with anxiety      Diabetes mellitus (H) 1/2012     Gastro-oesophageal reflux disease      Hypothyroidism 4/23/2015     Kidney stones      Low serum cortisol level (H)      Migraines      Other chronic pain     STOMACH     Other chronic pain     LUMBAR SPINE     Spasm of sphincter of Oddi        PAST SURGICAL HISTORY:    Past Surgical History:   Procedure Laterality Date     ARTHROPLASTY CARPOMETACARPAL (THUMB JOINT)  5/2/2014    Procedure: ARTHROPLASTY CARPOMETACARPAL (THUMB JOINT);  Surgeon: Carina Panda MD;  Location: [facility]     CHOLECYSTECTOMY  2004     COLONOSCOPY  7/18/2014    Procedure: COLONOSCOPY;  Surgeon: Aurora Sahu MD;  Location:  GI     COLONOSCOPY N/A 8/1/2017    Procedure: COLONOSCOPY;   Colonoscopy and upper endoscopy;  Surgeon: Deirdre Harris MD;  Location: [facility]     ENDOSCOPIC RETROGRADE CHOLANGIOPANCREATOGRAM       ENDOSCOPIC RETROGRADE CHOLANGIOPANCREATOGRAM  4/19/2011    Procedure:ENDOSCOPIC RETROGRADE CHOLANGIOPANCREATOGRAM; Pancreatic Stent Placement       ENDOSCOPIC RETROGRADE CHOLANGIOPANCREATOGRAM  5/26/2011    Procedure:ENDOSCOPIC RETROGRADE CHOLANGIOPANCREATOGRAM; with Pancreatic Stent Removal; Surgeon:DALE MIMS; Location:[facility]     ENDOSCOPY UPPER, COLONOSCOPY, COMBINED  4/25/2012    Procedure:COMBINED ENDOSCOPY UPPER, COLONOSCOPY; Enteroscopy with Bile Duct Stent Removal, Colonoscopy  *[facility] Safe Room*; Surgeon:GRACY GODWINIQ; Location:[facility]     ESOPHAGOSCOPY, GASTROSCOPY, DUODENOSCOPY (EGD), COMBINED  5/26/2011    Procedure:COMBINED ESOPHAGOSCOPY, GASTROSCOPY, DUODENOSCOPY (EGD); Surgeon:DALE MIMS; [facility]     ESOPHAGOSCOPY, GASTROSCOPY, DUODENOSCOPY (EGD), COMBINED N/A 10/30/2014    Procedure: COMBINED ESOPHAGOSCOPY, GASTROSCOPY, DUODENOSCOPY (EGD), BIOPSY SINGLE OR MULTIPLE;  Surgeon: Sarai Moon MD;  Location: [facility]     ESOPHAGOSCOPY, GASTROSCOPY, DUODENOSCOPY (EGD), COMBINED Left 7/6/2015    Procedure: COMBINED ESOPHAGOSCOPY, GASTROSCOPY, DUODENOSCOPY (EGD), BIOPSY SINGLE OR MULTIPLE;  Surgeon: Thomas Estrada MD;  Location: [facility]     ESOPHAGOSCOPY, GASTROSCOPY, DUODENOSCOPY (EGD), COMBINED N/A 7/8/2016    Procedure: COMBINED ESOPHAGOSCOPY, GASTROSCOPY, DUODENOSCOPY (EGD), BIOPSY SINGLE OR MULTIPLE;  Surgeon: Eloy Klein MD;  [facility]: [facility]     ESOPHAGOSCOPY, GASTROSCOPY, DUODENOSCOPY (EGD), COMBINED N/A 8/4/2016    Procedure: COMBINED ESOPHAGOSCOPY, GASTROSCOPY, DUODENOSCOPY (EGD), BIOPSY SINGLE OR MULTIPLE;  Surgeon: Jason Brown MD;  [facility]: [facility]     ESOPHAGOSCOPY, GASTROSCOPY, DUODENOSCOPY (EGD), COMBINED N/A 8/1/2017    Procedure: COMBINED ESOPHAGOSCOPY, GASTROSCOPY,  DUODENOSCOPY (EGD);;  Surgeon: Deirdre Harris MD;  Location: [facility]     GYN SURGERY      Hysterectomy and USO     [facility] ENDOSCOPY W EUS  7/20/2011    Procedure:COMBINED ENDOSCOPIC ULTRASOUND, ESOPHAGOSCOPY, GASTROSCOPY, DUODENOSCOPY (EGD); Surgeon:DARVIN DONOHUE; Location[facility]     HERNIORRHAPHY VENTRAL N/A 9/15/2016    Procedure: HERNIORRHAPHY VENTRAL;  Surgeon: Juanita Bernabe MD;  [facility]     HYSTERECTOMY  1997 or 1998    USO     INCISION AND DRAINAGE ABDOMEN WASHOUT, COMBINED  8/16/2012    Procedure: COMBINED INCISION AND DRAINAGE ABDOMEN WASHOUT;  ,debridement and Drainage Post Appendectomy;  Surgeon: Ron Austin MD;  [facility]     INJECT TRANSVERSUS ABDOMINIS PLANE (TAP) BLOCK BILATERAL Bilateral 5/26/2016    Procedure: INJECT TRANSVERSUS ABDOMINIS PLANE (TAP) BLOCK BILATERAL;  Surgeon: Leonard Mccallum MD;  [facility]     LAPAROSCOPIC APPENDECTOMY  7/30/2012    Procedure: LAPAROSCOPIC APPENDECTOMY;  Open Appendectomy;  Surgeon: Ron Austin MD;  [facility]: [facility]     PANCREATECTOMY, TRANSPLANT AUTO ISLET CELL, COMBINED  1/6/2012    Procedure:COMBINED PANCREATECTOMY, TRANSPLANT AUTO ISLET CELL; Total  Pancreatectomy, Auto Islet Transplant, splenectomy, 18fr. transgastric-jejunal feeding tube placement, liver biopsy; Surgeon:PALAK LEE; Location:[facility]     SPLENECTOMY         PAST ANESTHESIA HISTORY:     No personal or family h/o anesthesia problems    SOCIAL HISTORY:       Social History   Substance Use Topics     Smoking status: Never Smoker     Smokeless tobacco: Never Used     Alcohol use No       ALLERGIES:     Allergies   Allergen Reactions     Corticosteroids Other (See Comments)     All oral,IV and injectable steroids are contraindicated (unless in life threatening situations) in Islet Auto transplant recipients. They can cause irreversible loss of islet cell function. Please contact patients transplant care coordinator Malini Julien  CARLOS ORDAZ @[phone]/Pager [phone]  and Endocrinologist prior to administration.     Chocolate Flavor Rash     Breaks out when eats chocolate       MEDICATIONS:       (Not in a hospital admission)    Current Outpatient Prescriptions   Medication Sig Dispense Refill     traZODone (DESYREL) 100 MG tablet Take 1-2 tablets (100-200 mg) by mouth At Bedtime *AN HOUR BEFORE* 100 tablet 1     docusate sodium (DOK) 100 MG capsule Take 1 capsule (100 mg) by mouth daily as needed for constipation 120 capsule 0     alum & mag hydroxide-simethicone (GELUSIL) 200-200-25 MG CHEW chewable tablet CHEW AND SWALLOW 2 CHEWS EVERY 4 HOURS AS NEEDED FOR INDIGESTION 100 tablet 1     linaclotide (LINZESS) 145 MCG capsule Take 1 capsule (145 mcg) by mouth every morning (before breakfast) 30 capsule 1     potassium chloride SA (K-DUR/KLOR-CON M) 20 MEQ CR tablet Take 1 tablet (20 mEq) by mouth daily 30 tablet 0     dronabinol (MARINOL) 2.5 MG capsule Take 2 capsules (5 mg) by mouth 2 times daily as needed 56 capsule 0     hydrocortisone (CORTEF) 10 MG tablet Take 10 mg in the morning and 10 mg at bedtime. Watch for hypoglycemia recurrence. 60 tablet 3     DULoxetine (CYMBALTA) 30 MG EC capsule Take 1 capsule (30 mg) by mouth daily With 60mg capsule for total dose of 90mg 90 capsule 1     DULoxetine (CYMBALTA) 60 MG EC capsule Take 1 capsule (60 mg) by mouth daily With 30mg capsule for total dose of 90mg 90 capsule 1     senna (SENNA LAX) 8.6 MG tablet Take 1-2 tablets by mouth daily as needed for constipation 90 tablet 1     fluconazole (DIFLUCAN) 200 MG tablet Take 2 tabs the first day and 1 tab for the next 13 days 15 tablet 0     sodium bicarbonate 650 MG tablet Take one-half tablet (325 mg), via feeding tube every 4 hours. 135 tablet 3     ondansetron (ZOFRAN-ODT) 4 MG ODT tab DISSOLVE ONE TABLET ON THE TONGUE EVERY 6 HOURS AS NEEDED FOR NAUSEA 60 tablet 1     dicyclomine (BENTYL) 10 MG capsule TAKE ONE CAPSULE BY MOUTH EVERY 6  HOURS AS NEEDED 40 capsule 3     blood glucose monitoring (NOEMI CONTOUR NEXT) test strip Use to test blood sugar 8 times daily. 240 each 11     metoclopramide (REGLAN) 5 MG tablet Take 2 tablets (10 mg) by mouth 3 times daily (before meals 100 tablet 6     amylase-lipase-protease (CREON 12) 20713 UNITS CPEP Take 6 capsules with meals and 3 with snacks.  This is up to 24 capsules per day. 2160 capsule 3     furosemide (LASIX) 20 MG tablet Take 1 tablet (20 mg) by mouth 2 times daily 180 tablet 2     pregabalin (LYRICA) 150 MG capsule Take 1 capsule (150 mg) by mouth 3 times daily 90 capsule 5     cyclobenzaprine (FLEXERIL) 5 MG tablet Take 1 tablet (5 mg) by mouth 3 times daily as needed for muscle spasms 42 tablet 3     levothyroxine (SYNTHROID/LEVOTHROID) 112 MCG tablet Take 1 tablet (112 mcg) by mouth daily 90 tablet 3     Nutritional Supplements (BOOST HIGH PROTEIN) LIQD After above baseline labs are drawn, give: 6 mL/kg to maximum of 360 mL; the beverage is to be consumed within 5 minutes.  0     morphine 0.1% in solosite Place 1 g onto the skin 4 times daily as needed for pain 25 g 0     nystatin (MYCOSTATIN) 65959 unit/0.5mL SUSP Take 1 mL (100,000 Units) by mouth 4 times daily 60 mL 1     oxyCODONE (ROXICODONE) 5 MG immediate release tablet Take 1 tablet (5 mg) by mouth every 6 hours as needed 50 tablet 0     pantoprazole (PROTONIX) 40 MG enteric coated tablet Take 1 tablet (40 mg) by mouth 2 times daily 180 tablet 3     topiramate (TOPAMAX) 100 MG tablet Take 1 tablet (100 mg) by mouth 2 times daily 180 tablet 1     alendronate (FOSAMAX) 70 MG tablet Take 1 tablet (70 mg) by mouth every 7 days On Sundays take first thing in the morning with plain water and remain upright for at least 30 minutes and until after first food of the day    Do not restart Fosamax (alendronate) until your difficulty swallowing has resolved and you have finished the entire course of fluconazole (Diflucan). 4 tablet 0     blood  glucose monitoring (NO BRAND SPECIFIED) test strip Use to test blood glucoses 6-8 times per day. 240 each 11     insulin aspart (NOVOLOG VIAL) 100 UNITS/ML VIAL As directed in pump 36 vial prn     Nutritional Supplements (BOOST HIGH PROTEIN) LIQD After above baseline labs are drawn, give: 6 mL/kg to maximum of 360 mL; the beverage is to be consumed within 5 minutes.  0     SUMAtriptan (IMITREX) 50 MG tablet Take 1 tablet (50 mg) by mouth at onset of headache for migraine Take 1 Tab by mouth Once as needed for Migraine Headache. May repeat after two hours.  Maximum dose 200 mg/24 hours. 30 tablet 1     Nutritional Supplements (BOOST HIGH PROTEIN) LIQD Also can use Ensure clear (available over the counter)  0     acetaminophen 500 MG CAPS Take 1,000 mg by mouth three times a day as needed.       diclofenac (VOLTAREN) 1 % GEL 2 g Apply 2 g to skin four times a day as needed (to affected upper extremity joint(s)). Maximum 8g/day per joint, 16g/day total.       aspirin 81 MG tablet Take 81 mg by mouth daily.       insulin pen needle needle RX# [id number]   Pen Needle [facility] 31G UF IV Mini   Use 4-8 needles per day for insulin injections.     2 Box 11     polyethylene glycol (MIRALAX/GLYCOLAX) packet Take 1 packet by mouth 2 times daily as needed for constipation  14 each 5     ACCU-CHEK MULTICLIX LANCETS MISC by Lancet route. Use to test blood sugar daily or as directed. 102 each prn       Current Outpatient Prescriptions Ordered in [facility]   Medication Sig Dispense Refill     traZODone (DESYREL) 100 MG tablet Take 1-2 tablets (100-200 mg) by mouth At Bedtime *AN HOUR BEFORE* 100 tablet 1     docusate sodium (DOK) 100 MG capsule Take 1 capsule (100 mg) by mouth daily as needed for constipation 120 capsule 0     alum & mag hydroxide-simethicone (GELUSIL) 200-200-25 MG CHEW chewable tablet CHEW AND SWALLOW 2 CHEWS EVERY 4 HOURS AS NEEDED FOR INDIGESTION 100 tablet 1     linaclotide (LINZESS) 145 MCG capsule Take 1 capsule (145 mcg)  by mouth every morning (before breakfast) 30 capsule 1     potassium chloride SA (K-DUR/KLOR-CON M) 20 MEQ CR tablet Take 1 tablet (20 mEq) by mouth daily 30 tablet 0     dronabinol (MARINOL) 2.5 MG capsule Take 2 capsules (5 mg) by mouth 2 times daily as needed 56 capsule 0     hydrocortisone (CORTEF) 10 MG tablet Take 10 mg in the morning and 10 mg at bedtime. Watch for hypoglycemia recurrence. 60 tablet 3     DULoxetine (CYMBALTA) 30 MG EC capsule Take 1 capsule (30 mg) by mouth daily With 60mg capsule for total dose of 90mg 90 capsule 1     DULoxetine (CYMBALTA) 60 MG EC capsule Take 1 capsule (60 mg) by mouth daily With 30mg capsule for total dose of 90mg 90 capsule 1     senna (SENNA LAX) 8.6 MG tablet Take 1-2 tablets by mouth daily as needed for constipation 90 tablet 1     fluconazole (DIFLUCAN) 200 MG tablet Take 2 tabs the first day and 1 tab for the next 13 days 15 tablet 0     sodium bicarbonate 650 MG tablet Take one-half tablet (325 mg), via feeding tube every 4 hours. 135 tablet 3     ondansetron (ZOFRAN-ODT) 4 MG ODT tab DISSOLVE ONE TABLET ON THE TONGUE EVERY 6 HOURS AS NEEDED FOR NAUSEA 60 tablet 1     dicyclomine (BENTYL) 10 MG capsule TAKE ONE CAPSULE BY MOUTH EVERY 6 HOURS AS NEEDED 40 capsule 3     blood glucose monitoring (NOEMI CONTOUR NEXT) test strip Use to test blood sugar 8 times daily. 240 each 11     metoclopramide (REGLAN) 5 MG tablet Take 2 tablets (10 mg) by mouth 3 times daily (before meals 100 tablet 6     amylase-lipase-protease (CREON 12) 39934 UNITS CPEP Take 6 capsules with meals and 3 with snacks.  This is up to 24 capsules per day. 2160 capsule 3     furosemide (LASIX) 20 MG tablet Take 1 tablet (20 mg) by mouth 2 times daily 180 tablet 2     pregabalin (LYRICA) 150 MG capsule Take 1 capsule (150 mg) by mouth 3 times daily 90 capsule 5     cyclobenzaprine (FLEXERIL) 5 MG tablet Take 1 tablet (5 mg) by mouth 3 times daily as needed for muscle spasms 42 tablet 3      levothyroxine (SYNTHROID/LEVOTHROID) 112 MCG tablet Take 1 tablet (112 mcg) by mouth daily 90 tablet 3     Nutritional Supplements (BOOST HIGH PROTEIN) LIQD After above baseline labs are drawn, give: 6 mL/kg to maximum of 360 mL; the beverage is to be consumed within 5 minutes.  0     morphine 0.1% in solosite Place 1 g onto the skin 4 times daily as needed for pain 25 g 0     nystatin (MYCOSTATIN) 40179 unit/0.5mL SUSP Take 1 mL (100,000 Units) by mouth 4 times daily 60 mL 1     oxyCODONE (ROXICODONE) 5 MG immediate release tablet Take 1 tablet (5 mg) by mouth every 6 hours as needed 50 tablet 0     pantoprazole (PROTONIX) 40 MG enteric coated tablet Take 1 tablet (40 mg) by mouth 2 times daily 180 tablet 3     topiramate (TOPAMAX) 100 MG tablet Take 1 tablet (100 mg) by mouth 2 times daily 180 tablet 1     alendronate (FOSAMAX) 70 MG tablet Take 1 tablet (70 mg) by mouth every 7 days On Sundays take first thing in the morning with plain water and remain upright for at least 30 minutes and until after first food of the day    Do not restart Fosamax (alendronate) until your difficulty swallowing has resolved and you have finished the entire course of fluconazole (Diflucan). 4 tablet 0     blood glucose monitoring (NO BRAND SPECIFIED) test strip Use to test blood glucoses 6-8 times per day. 240 each 11     insulin aspart (NOVOLOG VIAL) 100 UNITS/ML VIAL As directed in pump 36 vial prn     Nutritional Supplements (BOOST HIGH PROTEIN) LIQD After above baseline labs are drawn, give: 6 mL/kg to maximum of 360 mL; the beverage is to be consumed within 5 minutes.  0     SUMAtriptan (IMITREX) 50 MG tablet Take 1 tablet (50 mg) by mouth at onset of headache for migraine Take 1 Tab by mouth Once as needed for Migraine Headache. May repeat after two hours.  Maximum dose 200 mg/24 hours. 30 tablet 1     Nutritional Supplements (BOOST HIGH PROTEIN) LIQD Also can use Ensure clear (available over the counter)  0     acetaminophen  500 MG CAPS Take 1,000 mg by mouth three times a day as needed.       diclofenac (VOLTAREN) 1 % GEL 2 g Apply 2 g to skin four times a day as needed (to affected upper extremity joint(s)). Maximum 8g/day per joint, 16g/day total.       aspirin 81 MG tablet Take 81 mg by mouth daily.       insulin pen needle needle RX# [id number]   Pen Needle [facility] 31G UF IV Mini   Use 4-8 needles per day for insulin injections.     2 Box 11     polyethylene glycol (MIRALAX/GLYCOLAX) packet Take 1 packet by mouth 2 times daily as needed for constipation  14 each 5     ACCU-CHEK MULTICLIX LANCETS MISC by Lancet route. Use to test blood sugar daily or as directed. 102 each prn     No current Epic-ordered facility-administered medications on file.        PHYSICAL EXAM:    Vitals: T Data Unavailable, P Data Unavailable, BP Data Unavailable, R Data Unavailable, SpO2  , Weight Wt Readings from Last 2 Encounters:   08/01/17 63.9 kg (140 lb 12.8 oz)   06/10/17 59.9 kg (132 lb)       See doc flowsheet      No Data Recorded    No Data Recorded    NPO STATUS: see doc flowsheet    LABS:    BMP:  Recent Labs   Lab Test  08/01/17   0906   NA  139   POTASSIUM  3.9   CHLORIDE  106   CO2  25   BUN  7   CR  0.65   GLC  120*   ELIZA  9.0       LFTs:   Recent Labs   Lab Test  01/19/17   0739   PROTTOTAL  6.6*   ALBUMIN  3.4   BILITOTAL  0.2   ALKPHOS  64   AST  17   ALT  20       CBC:   Recent Labs   Lab Test  08/01/17   0906   WBC  5.5   RBC  4.30   HGB  13.1   HCT  40.0   MCV  93   MCH  30.5   MCHC  32.8   RDW  13.3   PLT  358       Coags:  Recent Labs   Lab Test  11/15/16   1422   06/06/16   1315   01/06/12   1750   INR  1.04   < >  1.09   < >  1.37*   PTT   --    --   24   < >  47*   FIBR   --    --    --    --   227    < > = values in this interval not displayed.       Imaging:  No orders to display       Pancho Galan MD  Anesthesiology Staff  Pager [phone]    8/29/2017  2:32 PM                  .

## 2017-08-30 ENCOUNTER — SURGERY (OUTPATIENT)
Age: 52
End: 2017-08-30

## 2017-08-30 ENCOUNTER — HOSPITAL ENCOUNTER (OUTPATIENT)
Facility: AMBULATORY SURGERY CENTER | Age: 52
End: 2017-08-30
Attending: PHYSICIAN ASSISTANT

## 2017-08-30 ENCOUNTER — ANESTHESIA (OUTPATIENT)
Dept: SURGERY | Facility: AMBULATORY SURGERY CENTER | Age: 52
End: 2017-08-30

## 2017-08-30 VITALS
SYSTOLIC BLOOD PRESSURE: 111 MMHG | WEIGHT: 140 LBS | HEIGHT: 62 IN | RESPIRATION RATE: 16 BRPM | HEART RATE: 67 BPM | OXYGEN SATURATION: 100 % | BODY MASS INDEX: 25.76 KG/M2 | DIASTOLIC BLOOD PRESSURE: 73 MMHG | TEMPERATURE: 97.5 F

## 2017-08-30 DIAGNOSIS — Z97.8 USES FEEDING TUBE: Primary | ICD-10-CM

## 2017-08-30 RX ORDER — LIDOCAINE 40 MG/G
CREAM TOPICAL
Status: DISCONTINUED | OUTPATIENT
Start: 2017-08-30 | End: 2017-08-31 | Stop reason: HOSPADM

## 2017-08-30 RX ORDER — LIDOCAINE HYDROCHLORIDE 20 MG/ML
INJECTION, SOLUTION INFILTRATION; PERINEURAL PRN
Status: DISCONTINUED | OUTPATIENT
Start: 2017-08-30 | End: 2017-08-30

## 2017-08-30 RX ORDER — SODIUM CHLORIDE, SODIUM LACTATE, POTASSIUM CHLORIDE, CALCIUM CHLORIDE 600; 310; 30; 20 MG/100ML; MG/100ML; MG/100ML; MG/100ML
INJECTION, SOLUTION INTRAVENOUS CONTINUOUS
Status: DISCONTINUED | OUTPATIENT
Start: 2017-08-30 | End: 2017-08-31 | Stop reason: HOSPADM

## 2017-08-30 RX ORDER — ONDANSETRON 2 MG/ML
INJECTION INTRAMUSCULAR; INTRAVENOUS PRN
Status: DISCONTINUED | OUTPATIENT
Start: 2017-08-30 | End: 2017-08-30

## 2017-08-30 RX ORDER — PROPOFOL 10 MG/ML
INJECTION, EMULSION INTRAVENOUS CONTINUOUS PRN
Status: DISCONTINUED | OUTPATIENT
Start: 2017-08-30 | End: 2017-08-30

## 2017-08-30 RX ADMIN — PROPOFOL 150 MCG/KG/MIN: 10 INJECTION, EMULSION INTRAVENOUS at 13:19

## 2017-08-30 RX ADMIN — SODIUM CHLORIDE, SODIUM LACTATE, POTASSIUM CHLORIDE, CALCIUM CHLORIDE: 600; 310; 30; 20 INJECTION, SOLUTION INTRAVENOUS at 13:13

## 2017-08-30 RX ADMIN — ONDANSETRON 4 MG: 2 INJECTION INTRAMUSCULAR; INTRAVENOUS at 13:24

## 2017-08-30 RX ADMIN — LIDOCAINE HYDROCHLORIDE 50 MG: 20 INJECTION, SOLUTION INFILTRATION; PERINEURAL at 13:19

## 2017-08-30 NOTE — IP AVS SNAPSHOT
MRN:[id number]                      After Visit Summary   8/30/2017    Chantell Kidd    MRN: [id number]           Thank you!     Thank you for choosing [facility] for your care. Our goal is always to provide you with excellent care. Hearing back from our patients is one way we can continue to improve our services. Please take a few minutes to complete the written survey that you may receive in the mail after you visit with us. Thank you!        Patient Information     Date Of Birth          1965        About your hospital stay     You were admitted on:  August 30, 2017 [facility]    You were discharged on:  August 30, 2017       Who to Call     For medical emergencies, please call 911.  For non-urgent questions about your medical care, please call your primary care provider or clinic, [phone]  For questions related to your surgery, please call your surgery clinic        Attending Provider     Provider Specialty    Jose Nath PA-C Radiology       Primary Care Provider Office Phone # Fax #    Ron Morgan [phone]      Your next 10 appointments already scheduled     Sep 01, 2017  1:20 PM CDT   MA DIAGNOSTIC DIGITAL BILATERAL with MGMA2, MG MA TECH   [facility]    [address] [place] [id number] [phone]           Do not use any powder, lotion or deodorant under your arms or on your breast. If you do, we will ask you to remove it before your exam.  Wear comfortable, two-piece clothing.  If you have any allergies, tell your care team.  Bring any previous mammograms from other facilities or have them mailed to the breast center.  Three-dimensional (3D) mammograms are available at [facility] locations in [facility], and [place]. [facility] locations include [place] and [facility]  "Surgery Center in [place]. Benefits of 3D mammograms include: - Improved rate of cancer detection - Decreases your chance of having to go back for more tests, which means fewer: - \"False-positive\" results (This means that there is an abnormal area but it isn't cancer.) - Invasive testing procedures, such as a biopsy or surgery - Can provide clearer images of the breast if you have dense breast tissue. 3D mammography is an optional exam that anyone can have with a 2D mammogram. It doesn't replace or take the place of a 2D mammogram. 2D mammograms remain an effective screening test for all women.  Not all insurance companies cover the cost of a 3D mammogram. Check with your insurance.            Sep 21, 2017  8:40 AM CDT   (Arrive by 8:25 AM)   Return [place] with Amena Maher MD   [facility] Solid Organ Transplant ([facility])    [address] [place] [address]  3rd [facility] [address]   [phone]            Oct 09, 2017  8:00 AM CDT   (Arrive by 7:45 AM)   Return Visit with Thomas Estrada MD   [facility] ([facility])    [address] [place] [id number]   [phone]              Further instructions from your care team       [facility]  Home Care Following Anesthesia  For 24 hours after surgery:  1. Get plenty of rest.  A responsible adult must stay with you for at least 24 hours after you leave the surgery center.  2. Do not drive or use heavy equipment.  If you have weakness or tingling, don't drive or use heavy equipment until this feeling goes away.   3. Do not drink alcohol.   4. Avoid strenuous or risky activities.  Ask for help when climbing stairs.  5. You may feel lightheaded.  IF so, sit for a few minutes before standing.  Have someone help you get up.   6. If you have nausea (feel sick to your stomach): Drink only clear liquids such as " apple juice, ginger ale, broth or 7-Up.  Rest may also help.  Be sure to drink enough fluids.  Move to a regular diet as you feel able.   7. You may have a slight fever.  Call the doctor if your fever is over 100 F (37.7 C) (taken under the tongue) or lasts longer than 24 hours.  8. You may have a dry mouth, a sore throat, muscle aches or trouble sleeping. These should go away after 24 hours.  9. Do not make important or legal decisions.     Tips for taking pain medications  To get the best pain relief possible, remember these points:    Take pain medications as directed, before pain becomes severe.    Pain medication can upset your stomach: taking it with food may help.    Constipation is a common side effect of pain medication. Drink plenty of  fluids.    Eat foods high in fiber. Take a stool softener if recommended by your doctor or pharmacist.    Do not drink alcohol, drive or operate machinery while taking pain medications.    Ask about other ways to control pain, such as with heat, ice or relaxation.    Tylenol/Acetaminophen Consumption  To help encourage the safe use of acetaminophen, the makers of TYLENOL  have lowered the maximum daily dose for single-ingredient Extra Strength TYLENOL  (acetaminophen) products sold in the [place] from 8 pills per day (4,000 mg) to 6 pills per day (3,000 mg). The dosing interval has also changed from 2 pills every 4-6 hours to 2 pills every 6 hours.    If you feel your pain relief is insufficient, you may take Tylenol/Acetaminophen in addition to your narcotic pain medication.     Be careful not to exceed 3,000 mg of Tylenol/Acetaminophen in a 24 hour period from all sources.    If you are taking extra strength Tylenol/acetaminophen (500 mg), the maximum dose is 6 tablets in 24 hours.    If you are taking regular strength acetaminophen (325 mg), the maximum dose is 9 tablets in 24 hours.    Call a doctor for any of the followin. Signs of infection (fever, growing  tenderness at the surgery site, a large amount of drainage or bleeding, severe pain, foul-smelling drainage, redness, swelling).  2. It has been over 8 to 10 hours since surgery and you are still not able to urinate (pass water).  3. Headache for over 24 hours.  Your doctor is:    Jose Nath PA-C  For emergency care, call the:  [facility]:  [phone] (TTY for hearing impaired: [phone])      A collaboration between [facility] and [facility]  Experts in minimally invasive, targeted treatments performed using imaging guidance    Enteric Tube Exchanges (i.e., gastrostomy, jejunostomy, or gastrojejunostomy)    Today you had your existing enteric tube catheter exchanged, either for periodic routine change or because there was malfunction or other issues.    One of our Radiology PAs performed this procedure for you today:  ? RICK Nunez PA-C    After you go home:  - Drink plenty of fluids.  Generally 6-8 (8 ounce) glasses a day is recommended.  - Resume your regular diet and use of enteric tube unless otherwise ordered by a medical provider.  - Keep the dressing clean and dry.  Change the dressing if it gets wet or soiled.  Routinely change the dressing every 2-3 days to inspect the insertion site.  When changing dressings, clean around tube site gently with a washcloth and antibacterial soap.  - Take care during strenuous activities, such as mowing the lawn, vacuuming, playing sports, or engaging in anything that will cause your tube to be pulled or moved.    For 24 hours after any sedation used:  - Relax and take it easy.  No strenuous activities.  - Do not drive or operate machines at home or at work.  - No alcohol consumption.  - Do not make any important or legal decisions.    Call our Interventional Radiology (IR) service if:  - If you start bleeding from the procedure site.  If you do start to bleed from  "the site, lie down and hold some pressure on the site.  Our radiology provider can help you decide if you need to return to the hospital.  - If you develop persistent nausea or vomiting.  - If you develop hives or a rash or any unexplained itching.    Additional Instructions:    Please call our IR service for the following problems:        - If the skin around the tube is swollen, reddened, painful, or has any discharge.  - If you have persistent pain at the tube site.  - If you have a fever of greater than 100.5  F and chills.  - If you feel nauseated and \"just not right.\"      [facility]  Interventional Radiology (IR)  [address] [facility]  2nd Floor, [facility] [place] [address]    Contact Number:  [phone]  (IR control desk)  - Monday - Friday 8:00 am - 4:30 pm    After hours for urgent concerns:  [phone]  - After 4:30 pm Monday - Friday, Weekends and Holidays.   - Ask for Interventional Radiology on-call.  Someone is available 24 hours a day.  - [facility] toll free number:  [phone]                 Pending Results     Date and Time Order Name Status Description    8/29/2017 0850 IR GASTRO JEJUNOSTOMY TUBE CHANGE In process             Admission Information     Date & Time Provider Department Dept. Phone    8/30/2017 Jose Nath PA-C [facility] [phone]      Your Vitals Were     Blood Pressure Temperature Respirations Height Weight Pulse Oximetry    113/75 98.1  F (36.7  C) (Oral) 16 1.575 m (5' 2\") 63.5 kg (140 lb) 100%    BMI (Body Mass Index)                   25.61 kg/m2           [employer] Information     [employer] gives you secure access to your electronic health record. If you see a primary care provider, you can also send messages to your care team and make appointments. If you have questions, please call your primary care clinic.  If you do not have a primary care provider, please call [phone] and they will " assist you.      [employer] is an electronic gateway that provides easy, online access to your medical records. With [employer], you can request a clinic appointment, read your test results, renew a prescription or communicate with your care team.     To access your existing account, please contact your [facility] or call [phone] for assistance.        Care EveryWhere ID     This is your Care EveryWhere ID. This could be used by other organizations to access your [facility] medical records  [id number]        Equal Access to Services     BRIDGETTE JOHNSON : Hadii aad ku hadasho Soomaali, waaxda luqadaha, qaybta kaalmada adeegyada, waxay idiin haymadelynn braxton flowers. So [facility] [phone].    ATENCIÓN: Si habla español, tiene a webb disposición servicios gratuitos de asistencia lingüística. Llame al [phone].    We comply with applicable federal civil rights laws and [place] laws. We do not discriminate on the basis of race, color, national origin, age, disability sex, sexual orientation or gender identity.               Review of your medicines      UNREVIEWED medicines. Ask your doctor about these medicines        Dose / Directions    acetaminophen 500 MG Caps        Take 1,000 mg by mouth three times a day as needed.   Refills:  0       alendronate 70 MG tablet   Commonly known as:  FOSAMAX   Used for:  Osteoporosis        Dose:  70 mg   Take 1 tablet (70 mg) by mouth every 7 days On Sundays take first thing in the morning with plain water and remain upright for at least 30 minutes and until after first food of the day  Do not restart Fosamax (alendronate) until your difficulty swallowing has resolved and you have finished the entire course of fluconazole (Diflucan).   Quantity:  4 tablet   Refills:  0       alum & mag hydroxide-simethicone 200-200-25 MG Chew chewable tablet   Commonly known as:  GELUSIL   Used for:  Abdominal pain        CHEW AND SWALLOW 2 CHEWS EVERY 4 HOURS AS  NEEDED FOR INDIGESTION   Quantity:  100 tablet   Refills:  1       amylase-lipase-protease 79302 UNITS Cpep   Commonly known as:  CREON 12   Indication:  1-2 capsules with snacks.   Used for:  Exocrine pancreatic insufficiency        Take 6 capsules with meals and 3 with snacks.  This is up to 24 capsules per day.   Quantity:  2160 capsule   Refills:  3       aspirin 81 MG tablet        Take 81 mg by mouth daily.   Refills:  0       * BOOST HIGH PROTEIN Liqd   Used for:  Post-pancreatectomy diabetes (H), Pancreatic insufficiency, Vitamin D deficiency        After above baseline labs are drawn, give: 6 mL/kg to maximum of 360 mL; the beverage is to be consumed within 5 minutes.   Refills:  0       * BOOST HIGH PROTEIN Liqd   Used for:  Pancreatic insufficiency        Also can use Ensure clear (available over the counter)   Refills:  0       * BOOST HIGH PROTEIN Liqd   Used for:  Post-pancreatectomy diabetes (H), Vitamin D deficiency, unspecified        After above baseline labs are drawn, give: 6 mL/kg to maximum of 360 mL; the beverage is to be consumed within 5 minutes.   Refills:  0       cyclobenzaprine 5 MG tablet   Commonly known as:  FLEXERIL   Used for:  Abdominal pain, generalized        Dose:  5 mg   Take 1 tablet (5 mg) by mouth 3 times daily as needed for muscle spasms   Quantity:  42 tablet   Refills:  3       diclofenac 1 % Gel topical gel   Commonly known as:  VOLTAREN        Dose:  2 g   2 g Apply 2 g to skin four times a day as needed (to affected upper extremity joint(s)). Maximum 8g/day per joint, 16g/day total.   Refills:  0       dicyclomine 10 MG capsule   Commonly known as:  BENTYL   Used for:  Abdominal pain, epigastric        TAKE ONE CAPSULE BY MOUTH EVERY 6 HOURS AS NEEDED   Quantity:  40 capsule   Refills:  3       docusate sodium 100 MG capsule   Commonly known as:  DOK   Used for:  AP (abdominal pain)        Dose:  100 mg   Take 1 capsule (100 mg) by mouth daily as needed for  constipation   Quantity:  120 capsule   Refills:  0       dronabinol 2.5 MG capsule   Commonly known as:  MARINOL   Used for:  Routine health maintenance        Dose:  5 mg   Take 2 capsules (5 mg) by mouth 2 times daily as needed   Quantity:  56 capsule   Refills:  0       * DULoxetine 30 MG EC capsule   Commonly known as:  CYMBALTA   Used for:  Major depressive disorder, recurrent episode, moderate (H), CIERRA (generalized anxiety disorder)        Dose:  30 mg   Take 1 capsule (30 mg) by mouth daily With 60mg capsule for total dose of 90mg   Quantity:  90 capsule   Refills:  1       * DULoxetine 60 MG EC capsule   Commonly known as:  CYMBALTA   Used for:  Major depressive disorder, recurrent episode, moderate (H), CIERRA (generalized anxiety disorder)        Dose:  60 mg   Take 1 capsule (60 mg) by mouth daily With 30mg capsule for total dose of 90mg   Quantity:  90 capsule   Refills:  1       fluconazole 200 MG tablet   Commonly known as:  DIFLUCAN        Take 2 tabs the first day and 1 tab for the next 13 days   Quantity:  15 tablet   Refills:  0       furosemide 20 MG tablet   Commonly known as:  LASIX   Used for:  Edema, unspecified type        Dose:  20 mg   Take 1 tablet (20 mg) by mouth 2 times daily   Quantity:  180 tablet   Refills:  2       hydrocortisone 10 MG tablet   Commonly known as:  CORTEF   Used for:  Hypoglycemia, Adrenal insufficiency (H)        Take 10 mg in the morning and 10 mg at bedtime. Watch for hypoglycemia recurrence.   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)        As directed in pump   Quantity:  36 vial   Refills:  prn       levothyroxine 112 MCG tablet   Commonly known as:  SYNTHROID/LEVOTHROID   Used for:  Hypothyroidism        Dose:  112 mcg   Take 1 tablet (112 mcg) by mouth daily   Quantity:  90 tablet   Refills:  3       linaclotide 145 MCG capsule    Commonly known as:  LINZESS   Used for:  Constipation, unspecified constipation type, Chronic back pain, unspecified back location, unspecified back pain laterality, Physical deconditioning, Primary insomnia        Dose:  145 mcg   Take 1 capsule (145 mcg) by mouth every morning (before breakfast)   Quantity:  30 capsule   Refills:  1       metoclopramide 5 MG tablet   Commonly known as:  REGLAN   Used for:  Routine health maintenance        Take 2 tablets (10 mg) by mouth 3 times daily (before meals   Quantity:  100 tablet   Refills:  6       morphine 0.1% in solosite topical gel   Used for:  Generalized abdominal pain        Dose:  1 g   Place 1 g onto the skin 4 times daily as needed for pain   Quantity:  25 g   Refills:  0       nystatin 544464 unit/mL Susp suspension   Commonly known as:  MYCOSTATIN   Used for:  Odynophagia        Dose:  924169 Units   Take 1 mL (100,000 Units) by mouth 4 times daily   Quantity:  60 mL   Refills:  1       ondansetron 4 MG ODT tab   Commonly known as:  ZOFRAN-ODT   Used for:  Nausea        DISSOLVE ONE TABLET ON THE TONGUE EVERY 6 HOURS AS NEEDED FOR NAUSEA   Quantity:  60 tablet   Refills:  1       oxyCODONE 5 MG IR tablet   Commonly known as:  ROXICODONE   Used for:  S/P hernia repair        Dose:  5 mg   Take 1 tablet (5 mg) by mouth every 6 hours as needed   Quantity:  50 tablet   Refills:  0       pantoprazole 40 MG EC tablet   Commonly known as:  PROTONIX   Used for:  H. pylori infection        Dose:  40 mg   Take 1 tablet (40 mg) by mouth 2 times daily   Quantity:  180 tablet   Refills:  3       polyethylene glycol Packet   Commonly known as:  MIRALAX/GLYCOLAX   Used for:  Chronic constipation        Dose:  1 packet   Take 1 packet by mouth 2 times daily as needed for constipation   Quantity:  14 each   Refills:  5       potassium chloride SA 20 MEQ CR tablet   Commonly known as:  K-DUR/KLOR-CON M   Used for:  Hypokalemia        Dose:  20 mEq   Take 1 tablet (20 mEq) by  mouth daily   Quantity:  30 tablet   Refills:  0       pregabalin 150 MG capsule   Commonly known as:  LYRICA   Used for:  Chronic generalized abdominal pain        Dose:  150 mg   Take 1 capsule (150 mg) by mouth 3 times daily   Quantity:  90 capsule   Refills:  5       senna 8.6 MG tablet   Commonly known as:  SENNA LAX   Used for:  Other constipation        Dose:  1-2 tablet   Take 1-2 tablets by mouth daily as needed for constipation   Quantity:  90 tablet   Refills:  1       sodium bicarbonate 650 MG tablet   Used for:  Malnutrition (H)        Take one-half tablet (325 mg), via feeding tube every 4 hours.   Quantity:  135 tablet   Refills:  3       SUMAtriptan 50 MG tablet   Commonly known as:  IMITREX   Used for:  Migraine        Dose:  50 mg   Take 1 tablet (50 mg) by mouth at onset of headache for migraine Take 1 Tab by mouth Once as needed for Migraine Headache. May repeat after two hours.  Maximum dose 200 mg/24 hours.   Quantity:  30 tablet   Refills:  1       topiramate 100 MG tablet   Commonly known as:  TOPAMAX   Used for:  Migraine, unspecified, not intractable, without status migrainosus        Dose:  100 mg   Take 1 tablet (100 mg) by mouth 2 times daily   Quantity:  180 tablet   Refills:  1       traZODone 100 MG tablet   Commonly known as:  DESYREL   Used for:  Primary insomnia, Constipation, unspecified constipation type, Chronic back pain, unspecified back location, unspecified back pain laterality, Physical deconditioning        Dose:  100-200 mg   Take 1-2 tablets (100-200 mg) by mouth At Bedtime *AN HOUR BEFORE*   Quantity:  100 tablet   Refills:  1       * Notice:  This list has 5 medication(s) that are the same as other medications prescribed for you. Read the directions carefully, and ask your doctor or other care provider to review them with you.      CONTINUE these medicines which have NOT CHANGED        Dose / Directions    blood glucose monitoring lancets   Used for:  Chronic abdominal  pain        by Lancet route. Use to test blood sugar daily or as directed.   Quantity:  102 each   Refills:  prn       * blood glucose monitoring test strip   Commonly known as:  no brand specified   Used for:  Post-pancreatectomy diabetes (H)        Use to test blood glucoses 6-8 times per day.   Quantity:  240 each   Refills:  11       * blood glucose monitoring test strip   Commonly known as:  NOEMI CONTOUR NEXT   Used for:  Post-pancreatectomy diabetes (H)        Use to test blood sugar 8 times daily.   Quantity:  240 each   Refills:  11       insulin pen needle 31G X 5 MM   Used for:  Chronic abdominal pain        RX# [id number]  Pen Needle UC 31G UF IV Mini  Use 4-8 needles per day for insulin injections.   Quantity:  2 Box   Refills:  11       * Notice:  This list has 2 medication(s) that are the same as other medications prescribed for you. Read the directions carefully, and ask your doctor or other care provider to review them with you.             Protect others around you: Learn how to safely use, store and throw away your medicines at www.disposemymeds.org.             Medication List: This is a list of all your medications and when to take them. Check marks below indicate your daily home schedule. Keep this list as a reference.      Medications           Morning Afternoon Evening Bedtime As Needed    acetaminophen 500 MG Caps   Take 1,000 mg by mouth three times a day as needed.                                alendronate 70 MG tablet   Commonly known as:  FOSAMAX   Take 1 tablet (70 mg) by mouth every 7 days On Sundays take first thing in the morning with plain water and remain upright for at least 30 minutes and until after first food of the day  Do not restart Fosamax (alendronate) until your difficulty swallowing has resolved and you have finished the entire course of fluconazole (Diflucan).                                alum & mag hydroxide-simethicone 200-200-25 MG Chew chewable tablet   Commonly  known as:  GELUSIL   CHEW AND SWALLOW 2 CHEWS EVERY 4 HOURS AS NEEDED FOR INDIGESTION                                amylase-lipase-protease 86916 UNITS Cpep   Commonly known as:  CREON 12   Take 6 capsules with meals and 3 with snacks.  This is up to 24 capsules per day.                                aspirin 81 MG tablet   Take 81 mg by mouth daily.                                blood glucose monitoring lancets   by Lancet route. Use to test blood sugar daily or as directed.                                * blood glucose monitoring test strip   Commonly known as:  no brand specified   Use to test blood glucoses 6-8 times per day.                                * blood glucose monitoring test strip   Commonly known as:  Expensify CONTOUR NEXT   Use to test blood sugar 8 times daily.                                * BOOST HIGH PROTEIN Liqd   After above baseline labs are drawn, give: 6 mL/kg to maximum of 360 mL; the beverage is to be consumed within 5 minutes.                                * BOOST HIGH PROTEIN Liqd   Also can use Ensure clear (available over the counter)                                * BOOST HIGH PROTEIN Liqd   After above baseline labs are drawn, give: 6 mL/kg to maximum of 360 mL; the beverage is to be consumed within 5 minutes.                                cyclobenzaprine 5 MG tablet   Commonly known as:  FLEXERIL   Take 1 tablet (5 mg) by mouth 3 times daily as needed for muscle spasms                                diclofenac 1 % Gel topical gel   Commonly known as:  VOLTAREN   2 g Apply 2 g to skin four times a day as needed (to affected upper extremity joint(s)). Maximum 8g/day per joint, 16g/day total.                                dicyclomine 10 MG capsule   Commonly known as:  BENTYL   TAKE ONE CAPSULE BY MOUTH EVERY 6 HOURS AS NEEDED                                docusate sodium 100 MG capsule   Commonly known as:  DOK   Take 1 capsule (100 mg) by mouth daily as needed for  constipation                                dronabinol 2.5 MG capsule   Commonly known as:  MARINOL   Take 2 capsules (5 mg) by mouth 2 times daily as needed                                * DULoxetine 30 MG EC capsule   Commonly known as:  CYMBALTA   Take 1 capsule (30 mg) by mouth daily With 60mg capsule for total dose of 90mg                                * DULoxetine 60 MG EC capsule   Commonly known as:  CYMBALTA   Take 1 capsule (60 mg) by mouth daily With 30mg capsule for total dose of 90mg                                fluconazole 200 MG tablet   Commonly known as:  DIFLUCAN   Take 2 tabs the first day and 1 tab for the next 13 days                                furosemide 20 MG tablet   Commonly known as:  LASIX   Take 1 tablet (20 mg) by mouth 2 times daily                                hydrocortisone 10 MG tablet   Commonly known as:  CORTEF   Take 10 mg in the morning and 10 mg at bedtime. Watch for hypoglycemia recurrence.                                insulin aspart 100 UNITS/ML injection   Commonly known as:  NovoLOG VIAL   As directed in pump                                insulin pen needle 31G X 5 MM   RX# [id number]  Pen Needle UC 31G UF IV Mini  Use 4-8 needles per day for insulin injections.                                levothyroxine 112 MCG tablet   Commonly known as:  SYNTHROID/LEVOTHROID   Take 1 tablet (112 mcg) by mouth daily                                linaclotide 145 MCG capsule   Commonly known as:  LINZESS   Take 1 capsule (145 mcg) by mouth every morning (before breakfast)                                metoclopramide 5 MG tablet   Commonly known as:  REGLAN   Take 2 tablets (10 mg) by mouth 3 times daily (before meals                                morphine 0.1% in solosite topical gel   Place 1 g onto the skin 4 times daily as needed for pain                                nystatin [id number] unit/mL Susp suspension   Commonly known as:  MYCOSTATIN   Take 1 mL (100,000 Units) by  mouth 4 times daily                                ondansetron 4 MG ODT tab   Commonly known as:  ZOFRAN-ODT   DISSOLVE ONE TABLET ON THE TONGUE EVERY 6 HOURS AS NEEDED FOR NAUSEA                                oxyCODONE 5 MG IR tablet   Commonly known as:  ROXICODONE   Take 1 tablet (5 mg) by mouth every 6 hours as needed                                pantoprazole 40 MG EC tablet   Commonly known as:  PROTONIX   Take 1 tablet (40 mg) by mouth 2 times daily                                polyethylene glycol Packet   Commonly known as:  MIRALAX/GLYCOLAX   Take 1 packet by mouth 2 times daily as needed for constipation                                potassium chloride SA 20 MEQ CR tablet   Commonly known as:  K-DUR/KLOR-CON M   Take 1 tablet (20 mEq) by mouth daily                                pregabalin 150 MG capsule   Commonly known as:  LYRICA   Take 1 capsule (150 mg) by mouth 3 times daily                                senna 8.6 MG tablet   Commonly known as:  SENNA LAX   Take 1-2 tablets by mouth daily as needed for constipation                                sodium bicarbonate 650 MG tablet   Take one-half tablet (325 mg), via feeding tube every 4 hours.                                SUMAtriptan 50 MG tablet   Commonly known as:  IMITREX   Take 1 tablet (50 mg) by mouth at onset of headache for migraine Take 1 Tab by mouth Once as needed for Migraine Headache. May repeat after two hours.  Maximum dose 200 mg/24 hours.                                topiramate 100 MG tablet   Commonly known as:  TOPAMAX   Take 1 tablet (100 mg) by mouth 2 times daily                                traZODone 100 MG tablet   Commonly known as:  DESYREL   Take 1-2 tablets (100-200 mg) by mouth At Bedtime *AN HOUR BEFORE*                                * Notice:  This list has 7 medication(s) that are the same as other medications prescribed for you. Read the directions carefully, and ask your doctor or other care provider to  review them with you.

## 2017-08-30 NOTE — ANESTHESIA CARE TRANSFER NOTE
Patient: Chantell Kidd    Procedure(s):  GJ Tube Change - Wound Class: I-Clean    Diagnosis: Gastrostomy Complication  Diagnosis Additional Information: No value filed.    Anesthesia Type:   MAC     Note:  Airway :Room Air  Patient transferred to:Phase II  Comments: VSS and WNL, denies pain, no PONV, report to Rosalia GONZALEZ      Vitals: (Last set prior to Anesthesia Care Transfer)    CRNA VITALS  8/30/2017 1355 - 8/30/2017 1429      8/30/2017             NIBP: (!)  89/53    NIBP Mean: 66                Electronically Signed By: BEE Roy CRNA  August 30, 2017  2:29 PM

## 2017-08-30 NOTE — ANESTHESIA POSTPROCEDURE EVALUATION
Patient: Chantell Kidd    Procedure(s):  GJ Tube Change - Wound Class: I-Clean    Diagnosis:Gastrostomy Complication  Diagnosis Additional Information: No value filed.    Anesthesia Type:  MAC    Note:  Anesthesia Post Evaluation    Patient location during evaluation: bedside  Patient participation: Able to fully participate in evaluation  Level of consciousness: awake  Pain management: adequate  Airway patency: patent  Cardiovascular status: acceptable  Respiratory status: acceptable  Hydration status: acceptable  PONV: controlled     Anesthetic complications: None          Last vitals:  Vitals:    08/30/17 1001 08/30/17 1430   BP: 113/75 107/69   Pulse:  67   Resp: 16    Temp: 36.7  C (98.1  F) 36.4  C (97.5  F)   SpO2: 100% 99%         Electronically Signed By: Tom Schneider MD, MD  August 30, 2017  2:38 PM

## 2017-08-30 NOTE — PROCEDURES
Interventional Radiology Brief Post Procedure Note    Procedure:  IR GASTRO JEJUNOSTOMY TUBE CHANGE [[id number]]    Proceduralist: Jose Nath PA-C    Assistant: None    Time Out: Prior to the start of the procedure and with procedural staff participation, I verbally confirmed the patient s identity using two indicators, relevant allergies, that the procedure was appropriate and matched the consent or emergent situation, and that the correct equipment/implants were available. Immediately prior to starting the procedure I conducted the Time Out with the procedural staff and re-confirmed the patient s name, procedure, and site/side. (The Joint Commission universal protocol was followed.)  Yes        Sedation: Monitored Anesthesia Care (MAC) administered and documented by Anesthesia Care Provider    Findings: Completed gastrojejunostomy tube exchanged under fluoroscopic guidance.  A new 16 [place] 45 cm gastrojejunostomy tube ready for immediate use.  Three swab applicators of silver nitrate applied to site granulation tissue. Patient should return in 9-12 months for routine exchange or sooner if necessary.  Dx: Uses feeding tube; Complication of feeding tube; malpositioned .  Pham. MAC 12.3    Estimated Blood Loss: Minimal    Fluoroscopy Time:  minute(s)    SPECIMENS: None    Complications: 1. None     Condition: Stable    Plan:     Comments: See dictated procedure note for full details.    Jose Nath PA-C

## 2017-08-30 NOTE — IP AVS SNAPSHOT
[facility]    [address] [place] [id number]    Phone:  [phone]    Fax:  [phone]                                       After Visit Summary   8/30/2017    Chantell Kidd    MRN: [id number]           After Visit Summary Signature Page     I have received my discharge instructions, and my questions have been answered. I have discussed any challenges I see with this plan with the nurse or doctor.    ..........................................................................................................................................  Patient/Patient Representative Signature      ..........................................................................................................................................  Patient Representative Print Name and Relationship to Patient    ..................................................               ................................................  Date                                            Time    ..........................................................................................................................................  Reviewed by Signature/Title    ...................................................              ..............................................  Date                                                            Time

## 2017-08-30 NOTE — DISCHARGE INSTRUCTIONS
[facility]  Home Care Following Anesthesia  For 24 hours after surgery:  1. Get plenty of rest.  A responsible adult must stay with you for at least 24 hours after you leave the surgery center.  2. Do not drive or use heavy equipment.  If you have weakness or tingling, don't drive or use heavy equipment until this feeling goes away.   3. Do not drink alcohol.   4. Avoid strenuous or risky activities.  Ask for help when climbing stairs.  5. You may feel lightheaded.  IF so, sit for a few minutes before standing.  Have someone help you get up.   6. If you have nausea (feel sick to your stomach): Drink only clear liquids such as apple juice, ginger ale, broth or 7-Up.  Rest may also help.  Be sure to drink enough fluids.  Move to a regular diet as you feel able.   7. You may have a slight fever.  Call the doctor if your fever is over 100 F (37.7 C) (taken under the tongue) or lasts longer than 24 hours.  8. You may have a dry mouth, a sore throat, muscle aches or trouble sleeping. These should go away after 24 hours.  9. Do not make important or legal decisions.     Tips for taking pain medications  To get the best pain relief possible, remember these points:    Take pain medications as directed, before pain becomes severe.    Pain medication can upset your stomach: taking it with food may help.    Constipation is a common side effect of pain medication. Drink plenty of  fluids.    Eat foods high in fiber. Take a stool softener if recommended by your doctor or pharmacist.    Do not drink alcohol, drive or operate machinery while taking pain medications.    Ask about other ways to control pain, such as with heat, ice or relaxation.    Tylenol/Acetaminophen Consumption  To help encourage the safe use of acetaminophen, the makers of TYLENOL  have lowered the maximum daily dose for single-ingredient Extra Strength TYLENOL  (acetaminophen) products sold in the [place] from 8 pills per day  (4,000 mg) to 6 pills per day (3,000 mg). The dosing interval has also changed from 2 pills every 4-6 hours to 2 pills every 6 hours.    If you feel your pain relief is insufficient, you may take Tylenol/Acetaminophen in addition to your narcotic pain medication.     Be careful not to exceed 3,000 mg of Tylenol/Acetaminophen in a 24 hour period from all sources.    If you are taking extra strength Tylenol/acetaminophen (500 mg), the maximum dose is 6 tablets in 24 hours.    If you are taking regular strength acetaminophen (325 mg), the maximum dose is 9 tablets in 24 hours.    Call a doctor for any of the followin. Signs of infection (fever, growing tenderness at the surgery site, a large amount of drainage or bleeding, severe pain, foul-smelling drainage, redness, swelling).  2. It has been over 8 to 10 hours since surgery and you are still not able to urinate (pass water).  3. Headache for over 24 hours.  Your doctor is:    Jose Nath PA-C  For emergency care, call the:  [facility]:  [phone] (TTY for hearing impaired: [phone])      A collaboration between [facility] and [facility]  Experts in minimally invasive, targeted treatments performed using imaging guidance    Enteric Tube Exchanges (i.e., gastrostomy, jejunostomy, or gastrojejunostomy)    Today you had your existing enteric tube catheter exchanged, either for periodic routine change or because there was malfunction or other issues.    One of our Radiology PAs performed this procedure for you today:  ? RICK Nunez PA-C    After you go home:  - Drink plenty of fluids.  Generally 6-8 (8 ounce) glasses a day is recommended.  - Resume your regular diet and use of enteric tube unless otherwise ordered by a medical provider.  - Keep the dressing clean and dry.  Change the dressing if it gets wet or soiled.  Routinely change the dressing every 2-3  "days to inspect the insertion site.  When changing dressings, clean around tube site gently with a washcloth and antibacterial soap.  - Take care during strenuous activities, such as mowing the lawn, vacuuming, playing sports, or engaging in anything that will cause your tube to be pulled or moved.    For 24 hours after any sedation used:  - Relax and take it easy.  No strenuous activities.  - Do not drive or operate machines at home or at work.  - No alcohol consumption.  - Do not make any important or legal decisions.    Call our Interventional Radiology (IR) service if:  - If you start bleeding from the procedure site.  If you do start to bleed from the site, lie down and hold some pressure on the site.  Our radiology provider can help you decide if you need to return to the hospital.  - If you develop persistent nausea or vomiting.  - If you develop hives or a rash or any unexplained itching.    Additional Instructions:    Please call our IR service for the following problems:        - If the skin around the tube is swollen, reddened, painful, or has any discharge.  - If you have persistent pain at the tube site.  - If you have a fever of greater than 100.5  F and chills.  - If you feel nauseated and \"just not right.\"      [facility]  Interventional Radiology (IR)  [address] [place] [address]    Contact Number:  [phone]  (IR control desk)  - Monday - Friday 8:00 am - 4:30 pm    After hours for urgent concerns:  [phone]  - After 4:30 pm Monday - Friday, Weekends and Holidays.   - Ask for Interventional Radiology on-call.  Someone is available 24 hours a day.  - [facility] toll free number:  [phone]               "

## 2017-09-09 DIAGNOSIS — R10.84 ABDOMINAL PAIN, GENERALIZED: ICD-10-CM

## 2017-09-09 DIAGNOSIS — E87.6 HYPOKALEMIA: ICD-10-CM

## 2017-09-11 ENCOUNTER — TELEPHONE (OUTPATIENT)
Dept: TRANSPLANT | Facility: CLINIC | Age: 52
End: 2017-09-11

## 2017-09-11 DIAGNOSIS — E13.9 POST-PANCREATECTOMY DIABETES (H): Primary | ICD-10-CM

## 2017-09-11 DIAGNOSIS — E89.1 POST-PANCREATECTOMY DIABETES (H): Primary | ICD-10-CM

## 2017-09-11 DIAGNOSIS — Z90.410 POST-PANCREATECTOMY DIABETES (H): Primary | ICD-10-CM

## 2017-09-11 DIAGNOSIS — K86.89 PANCREATIC INSUFFICIENCY: ICD-10-CM

## 2017-09-11 NOTE — TELEPHONE ENCOUNTER
Spoke with Chantell about her needing to do a mini boost test with c-peptide and glucose values. Instructed her to be fasting. Will send boost instructions and review stopping her tube feeds. Chantell verbalized understanding.

## 2017-09-12 RX ORDER — CYCLOBENZAPRINE HCL 5 MG
5 TABLET ORAL 3 TIMES DAILY PRN
Qty: 42 TABLET | Refills: 2 | Status: ON HOLD | OUTPATIENT
Start: 2017-09-12 | End: 2018-08-27

## 2017-09-12 RX ORDER — POTASSIUM CHLORIDE 1500 MG/1
20 TABLET, EXTENDED RELEASE ORAL DAILY
Qty: 90 TABLET | Refills: 0 | Status: SHIPPED | OUTPATIENT
Start: 2017-09-12 | End: 2018-04-07

## 2017-09-12 NOTE — TELEPHONE ENCOUNTER
FLEXERIL       Last Written Prescription Date:  1/23/17  Last Fill Quantity: 42,   # refills: 3  Last Office Visit : 12/20/16  Future Office visit:  NONE  Routing refill request to provider for review/approval because:  Drug not on the refill protocol or controlled substance     potassium chloride       Last Written Prescription Date:  6/27/17  Last Fill Quantity: 30,   # refills: 0  Potassium   Date Value Ref Range Status   08/01/2017 3.9 3.4 - 5.3 mmol/L Final

## 2017-10-19 ENCOUNTER — OFFICE VISIT (OUTPATIENT)
Dept: TRANSPLANT | Facility: CLINIC | Age: 52
End: 2017-10-19
Attending: TRANSPLANT SURGERY
Payer: MEDICARE

## 2017-10-19 ENCOUNTER — OFFICE VISIT (OUTPATIENT)
Dept: TRANSPLANT | Facility: CLINIC | Age: 52
End: 2017-10-19
Attending: PEDIATRICS
Payer: MEDICARE

## 2017-10-19 VITALS
HEART RATE: 69 BPM | DIASTOLIC BLOOD PRESSURE: 73 MMHG | BODY MASS INDEX: 27.57 KG/M2 | RESPIRATION RATE: 16 BRPM | OXYGEN SATURATION: 100 % | SYSTOLIC BLOOD PRESSURE: 111 MMHG | HEIGHT: 62 IN | TEMPERATURE: 98.2 F | WEIGHT: 149.8 LBS

## 2017-10-19 DIAGNOSIS — E89.1 POST-PANCREATECTOMY DIABETES (H): Primary | ICD-10-CM

## 2017-10-19 DIAGNOSIS — E13.9 POST-PANCREATECTOMY DIABETES (H): Primary | ICD-10-CM

## 2017-10-19 DIAGNOSIS — Z90.410 POST-PANCREATECTOMY DIABETES (H): ICD-10-CM

## 2017-10-19 DIAGNOSIS — E89.1 POST-PANCREATECTOMY DIABETES (H): ICD-10-CM

## 2017-10-19 DIAGNOSIS — Z90.410 POST-PANCREATECTOMY DIABETES (H): Primary | ICD-10-CM

## 2017-10-19 DIAGNOSIS — E13.9 POST-PANCREATECTOMY DIABETES (H): ICD-10-CM

## 2017-10-19 DIAGNOSIS — K86.89 PANCREATIC INSUFFICIENCY: ICD-10-CM

## 2017-10-19 LAB
C PEPTIDE SERPL-MCNC: 2.4 NG/ML (ref 0.9–6.9)
GLUCOSE SERPL-MCNC: 202 MG/DL (ref 70–99)
HBA1C MFR BLD: 6.4 % (ref 4.3–6)

## 2017-10-19 PROCEDURE — 40000809 ZZH STATISTIC NO DOCUMENTATION TO SUPPORT CHARGE

## 2017-10-19 PROCEDURE — 83036 HEMOGLOBIN GLYCOSYLATED A1C: CPT | Performed by: PEDIATRICS

## 2017-10-19 PROCEDURE — 99211 OFF/OP EST MAY X REQ PHY/QHP: CPT | Mod: ZF

## 2017-10-19 PROCEDURE — 86341 ISLET CELL ANTIBODY: CPT | Performed by: PEDIATRICS

## 2017-10-19 PROCEDURE — 36415 COLL VENOUS BLD VENIPUNCTURE: CPT | Performed by: PEDIATRICS

## 2017-10-19 PROCEDURE — 82947 ASSAY GLUCOSE BLOOD QUANT: CPT | Performed by: PEDIATRICS

## 2017-10-19 PROCEDURE — 99212 OFFICE O/P EST SF 10 MIN: CPT | Mod: ZF

## 2017-10-19 PROCEDURE — 84681 ASSAY OF C-PEPTIDE: CPT | Performed by: PEDIATRICS

## 2017-10-19 ASSESSMENT — PAIN SCALES - GENERAL: PAINLEVEL: MILD PAIN (2)

## 2017-10-19 NOTE — PROGRESS NOTES
SP TPIAT 2012  Now with right paramedian pain for past 2 weeks.  Thinks it is a hernia.  No change in diet, bowels, fever or chills.  No precipitating event.    Exam:  Tender about 3 cm to r of upper midline incision.  No palpable defect.    I/P  Will obtain CT scan with oral contrast to assess for hernia.  RTC after scan.

## 2017-10-19 NOTE — LETTER
10/19/2017       RE: Chantell Kidd  [address]  [facility] [id number]     Dear Colleague,    Thank you for referring your patient, Chantell Kidd, to the [facility] SOLID ORGAN TRANSPLANT at [facility]. Please see a copy of my visit note below.    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     Post-pancreatectomy diabetes (H)     Type 1 diabetes mellitus (H)     Migraine     Abdominal muscle strain     CIERRA (generalized anxiety disorder)     Major depressive disorder, recurrent episode, moderate (H)     CMC DJD(carpometacarpal degenerative joint disease), localized primary     Exocrine pancreatic insufficiency     Hypothyroidism     Hypoglycemia     Mood disorder due to a general medical condition     Decreased oral intake     Odynophagia     Iron deficiency     Anemia, iron deficiency     Adrenal insufficiency (H)     S/P hernia repair     Nausea       HPI:  Chantell is a 51 year old female here for follow up of total pancreatectomy and islet autotransplant performed on January 6, 2012.  At the time of the procedure, the patient received 420,000 IEQ, or 5,438 IEQ/kg body weight.  She did not have diabetes before the procedure.  Early post-operative course was complicated by RLQ with appendicitis, eventually required appendectomy.    Despite the high islet mass transplanted, Chantell's post-operative course was initially remarkable for high insulin needs.  She in fact does have islet function, as previously documented by mixed meal tests, but she was insulin resistant, requiring high doses of insulin when on MDI therapy.  She also had frequent day to day  variability, and fluctuating patterns with illness and healthier times, as well as a complex regimen, all of which make her an excellent candidate for an insulin pump which she started in Feb 2014.  Extremely concerning was an episode of severe hypoglycemia in November 2013 at which time she was found unconscious.  Suspect at that time she was on too much basal insulin and because she was ill and not eating at the time, dropped far too low overnight.   In early 2014, she was started on an insulin pump with CGM.  Remarkably, her insulin needs have dropped dramatically on an insulin pump.  She was then relatively stable until 2016.  She was again admitted to the hospital on March 15 and March 29, 2016 for hypoglycemia, that appears to be secondary to both exogenous insulin and possible central adrenal insufficiency.   At that time she had the following lab findings:  On 3/29/16, elevated insulin with low C-peptide during BG 42 mg/dL around 5pm, and then again same pattern with BG 50s at 10pm.  Chantell is pretty clear that she did not take insuiln that day on 3/29/16. She also had three cortisol levels-- including one during hypoglycemia, one at around 4am (possibly also during hypoglycemia) and one 8am draw that were all low-- all 0.6- 1.6 range.    Of note, she did have a kenalog injection one week earlier on 3/24/16, just a few days prior to that draw and was also receiving narcotics in hospital (but not at home).  She has since had another severe hypogylcemic episode in Jan 2017 -- did not lose consciousness but was disoriented and unable to get help for herself at the store.    Picture is also complicated by non-diabetes history which includes the following :  - 7/6/2016 EGD identified diffuse candidiasis through entire esophagous.  Pt has also had recurrent oral thrush in 2016  - Recurrent chronic abdominal pain  - Recurrent vomiting of unclear etiology but G-T placed 10/2016 and converted to GJ 11/2016 with  symptomatic improvement  Unclear if she has any gastroparesis.  Suboptimal study in March 2016 showed 100% retention at 1 hour and then rapid emptying.    - Hernia repair performed by general surgery 9/15/16 (TPIAT team not involved).    - Chronic abdominal pain      New history today:  1)  Diabetes:    Struggling with adherence to diabetes care plan.  Has 4 glucose checks yesterday but only 5 done for the entire 27 days prior to this.  Pump indicates that she is changing her site every 5-11 days (should be 3).  Rarely boluses.  No food coverage although she says that is mainly because she can't keep down food, that when she does eat it comes back up.  TF are Isosource 1.5 at 40 mL/hour.  She says her  worries because she has hypoglycemia episodes in the middle of the night, but hard to say when or if she is truly hypoglycemic since we have so few BG checks.  She has not had any new severe hypoglycemia but does have a history of not recognizing lows early and having severe hypo events.  There are some highs yesterday with no bolus on the pump, but she does tell me she took a 4 unit injection because her pump was not bolusing correctly.  This does fit with her #s which drops from 300s to 100s in 2 hours when she reports the 4 unit dose.  She does have a new pump on order from [employer].  She would like to change to 670g next year.  If she takes a shower or stops TF to eat for a short period of time she suspends the pump, but she has no plan for how to reduce basal for longer periods off TF and in fact left her usual basal rate running last night even though she was off feeds.      Current insuiln:  on insulin pump at settings of:  Basal 12a 0.80  Bolus;  I:C 8g- does not use because she only gets nutrition from TF right now  , ISF 50 mg/dL, Target  mg/dL  Total daily insulin dose:  14.9 units/day, 1% bolus:  14.8 basal + 0.1 bolus    Feeds:  Isosource 1.5 at 40 mL/hour  Wt Readings from Last 4  Encounters:   10/19/17 149 lb 12.8 oz (67.9 kg)   08/30/17 140 lb (63.5 kg)   08/01/17 140 lb 12.8 oz (63.9 kg)   06/10/17 132 lb (59.9 kg)     Body mass index is 27.4 kg/(m^2).      Recent hemoglobin A1c levels:  Lab Results   Component Value Date    A1C 6.4 10/19/2017    A1C 5.9 01/19/2017    A1C 6.8 11/16/2016    A1C 6.7 09/15/2016    A1C 9.1 08/03/2016           2)  Adrenal insufficiency:  Still unclear if this was transient or true central AI but we have been treating her regardless due to SHE history.  Since our last visit, we have maintained her on 20 mg/day (10 BID) of cortef, with Body surface area is 1.72 meters squared. For daily dose of : 12 mg/m2/day.   This was not addressed further today as we focused on the diabetes but we do need to wean this      3)  Other new concerns include:   Still has chronic abdominal pain that is different from her previous pancreatitis pain.  She feels like she may have another hernia and is seeing Dr Alves after me.  She is not taking any narcotics.         Review of systems:  Review Of Systems  Skin: negative  Eyes: negative  Ears/Nose/Throat: negative  Respiratory: No shortness of breath, dyspnea on exertion, cough, or hemoptysis  Cardiovascular: negative  Gastrointestinal: chronic abdominal pain + hernia  Genitourinary: negative  Musculoskeletal: negative  Neurologic: negative  Psychiatric: negative  Hematologic/Lymphatic/Immunologic: negative  Endocrine: as above    Past Medical and Surgical History:  Past Medical History:   Diagnosis Date     Chronic abdominal pain      Chronic pancreatitis (H)     S/P pancreatectomy     Depression with anxiety      Diabetes mellitus (H) 1/2012     Gastro-oesophageal reflux disease      Hypothyroidism 4/23/2015     Kidney stones      Low serum cortisol level (H)      Migraines      Other chronic pain     STOMACH     Other chronic pain     LUMBAR SPINE     Spasm of sphincter of Oddi      Past Surgical History:   Procedure Laterality  Date     ARTHROPLASTY CARPOMETACARPAL (THUMB JOINT)  5/2/2014    Procedure: ARTHROPLASTY CARPOMETACARPAL (THUMB JOINT);  Surgeon: Carina Panda MD;  Location: [facility]     CHOLECYSTECTOMY  2004     COLONOSCOPY  7/18/2014    Procedure: COLONOSCOPY;  Surgeon: Aurora Sahu MD;  Location: [facility]     COLONOSCOPY N/A 8/1/2017    Procedure: COLONOSCOPY;  Colonoscopy and upper endoscopy;  Surgeon: Deirdre Harris MD;  [facility]: [facility]     ENDOSCOPIC RETROGRADE CHOLANGIOPANCREATOGRAM       ENDOSCOPIC RETROGRADE CHOLANGIOPANCREATOGRAM  4/19/2011    Procedure:ENDOSCOPIC RETROGRADE CHOLANGIOPANCREATOGRAM; Pancreatic Stent Placement       ENDOSCOPIC RETROGRADE CHOLANGIOPANCREATOGRAM  5/26/2011    Procedure:ENDOSCOPIC RETROGRADE CHOLANGIOPANCREATOGRAM; with Pancreatic Stent Removal; Surgeon:DALE MIMS; Location[facility]     ENDOSCOPY UPPER, COLONOSCOPY, COMBINED  4/25/2012    Procedure:COMBINED ENDOSCOPY UPPER, COLONOSCOPY; Enteroscopy with Bile Duct Stent Removal, Colonoscopy  *Latex Safe Room*; Surgeon:GRACY GODWINIQ; Location[facility]     ESOPHAGOSCOPY, GASTROSCOPY, DUODENOSCOPY (EGD), COMBINED  5/26/2011    Procedure:COMBINED ESOPHAGOSCOPY, GASTROSCOPY, DUODENOSCOPY (EGD); Surgeon:DALE MIMS; [facility]     ESOPHAGOSCOPY, GASTROSCOPY, DUODENOSCOPY (EGD), COMBINED N/A 10/30/2014    Procedure: COMBINED ESOPHAGOSCOPY, GASTROSCOPY, DUODENOSCOPY (EGD), BIOPSY SINGLE OR MULTIPLE;  Surgeon: Sarai Moon MD;  [facility]     ESOPHAGOSCOPY, GASTROSCOPY, DUODENOSCOPY (EGD), COMBINED Left 7/6/2015    Procedure: COMBINED ESOPHAGOSCOPY, GASTROSCOPY, DUODENOSCOPY (EGD), BIOPSY SINGLE OR MULTIPLE;  Surgeon: Thomas Estrada MD;  [facility]     ESOPHAGOSCOPY, GASTROSCOPY, DUODENOSCOPY (EGD), COMBINED N/A 7/8/2016    Procedure: COMBINED ESOPHAGOSCOPY, GASTROSCOPY, DUODENOSCOPY (EGD), BIOPSY SINGLE OR MULTIPLE;  Surgeon: Eloy Klein MD;   [facility]     ESOPHAGOSCOPY, GASTROSCOPY, DUODENOSCOPY (EGD), COMBINED N/A 8/4/2016    Procedure: COMBINED ESOPHAGOSCOPY, GASTROSCOPY, DUODENOSCOPY (EGD), BIOPSY SINGLE OR MULTIPLE;  Surgeon: Jason Brown MD;  [facility]     ESOPHAGOSCOPY, GASTROSCOPY, DUODENOSCOPY (EGD), COMBINED N/A 8/1/2017    Procedure: COMBINED ESOPHAGOSCOPY, GASTROSCOPY, DUODENOSCOPY (EGD);;  Surgeon: Deirdre Harris MD;  [facility]     GYN SURGERY      Hysterectomy and USO     [facility] ENDOSCOPY W EUS  7/20/2011    Procedure:COMBINED ENDOSCOPIC ULTRASOUND, ESOPHAGOSCOPY, GASTROSCOPY, DUODENOSCOPY (EGD); Surgeon:DARVNI DONOHUE; [facility]     HERNIORRHAPHY VENTRAL N/A 9/15/2016    Procedure: HERNIORRHAPHY VENTRAL;  Surgeon: Juanita Bernabe MD;  [facility]     HYSTERECTOMY  1997 or 1998    USO     INCISION AND DRAINAGE ABDOMEN WASHOUT, COMBINED  8/16/2012    Procedure: COMBINED INCISION AND DRAINAGE ABDOMEN WASHOUT;  ,debridement and Drainage Post Appendectomy;  Surgeon: Ron Austin MD;  [facility]     INJECT TRANSVERSUS ABDOMINIS PLANE (TAP) BLOCK BILATERAL Bilateral 5/26/2016    Procedure: INJECT TRANSVERSUS ABDOMINIS PLANE (TAP) BLOCK BILATERAL;  Surgeon: Leonard Mccallum MD;  [facility]     LAPAROSCOPIC APPENDECTOMY  7/30/2012    Procedure: LAPAROSCOPIC APPENDECTOMY;  Open Appendectomy;  Surgeon: Ron Austin MD;  [facility]     PANCREATECTOMY, TRANSPLANT AUTO ISLET CELL, COMBINED  1/6/2012    Procedure:COMBINED PANCREATECTOMY, TRANSPLANT AUTO ISLET CELL; Total  Pancreatectomy, Auto Islet Transplant, splenectomy, 18fr. transgastric-jejunal feeding tube placement, liver biopsy; Surgeon:PALAK LEE; [facility]     REPLACE GASTROSTOMY TUBE, PERCUTANEOUS N/A 8/30/2017    Procedure: REPLACE GASTROSTOMY TUBE, PERCUTANEOUS;  GJ Tube Change;  Surgeon: Jose Nath PA-C;  [facility]     SPLENECTOMY         Family History:  New  "changes since last visit:  none  Family History   Problem Relation Age of Onset     Hypertension Mother      DIABETES Mother      OSTEOPOROSIS Mother      CANCER Father      pancreatic cancer     DIABETES Maternal Grandmother      Cardiovascular Maternal Grandmother      CANCER Maternal Grandfather      lung cancer     CANCER Sister      brain     CANCER Sister      liver cancer       Social History:  Social History     Social History Narrative     Unchanged. Lives in [place] with her .  Has 5 grandchildren       Physical Exam:  Vitals: /73  Pulse 69  Temp 98.2  F (36.8  C) (Oral)  Resp 16  Ht 5' 2\" (157.5 cm)  Wt 149 lb 12.8 oz (67.9 kg)  SpO2 100%  BMI 27.4 kg/m2  BMI= Body mass index is 27.4 kg/(m^2).     General:  Well-appearing, NAD  Head: NC/AT  Eyes: sclera white  Abdomen is tender  Neuro:  Normal mental status, normal gross motor  Psych:  Communicative, with normal affect     Results:  Mixed Meal Test:  C Peptide   Date Value Ref Range Status   10/19/2017 2.4 0.9 - 6.9 ng/mL Final   01/19/2017 1.9 0.9 - 6.9 ng/mL Final   01/19/2017 2.2 0.9 - 6.9 ng/mL Final   01/19/2017 1.5 0.9 - 6.9 ng/mL Final   04/07/2016 2.3 0.9 - 6.9 ng/mL Final     Glucose   Date Value Ref Range Status   10/19/2017 202 (H) 70 - 99 mg/dL Final   08/01/2017 120 (H) 70 - 99 mg/dL Final   01/19/2017 140 (H) 70 - 99 mg/dL Final   01/19/2017 153 (H) 70 - 99 mg/dL Final   01/19/2017 202 (H) 70 - 99 mg/dL Final     Comment:     Fasting specimen       Hemoglobin A1c  Lab Results   Component Value Date    A1C 6.4 10/19/2017    A1C 5.9 01/19/2017    A1C 6.8 11/16/2016    A1C 6.7 09/15/2016    A1C 9.1 08/03/2016         Liver enzymes  AST       17   1/19/2017  ALT       20   1/19/2017  BILICONJ      0.0   5/20/2014   BILITOTAL      0.2   1/19/2017  ALBUMIN      3.4   1/19/2017  PROTTOTAL      6.6   1/19/2017   ALKPHOS       64   1/19/2017    Creatinine:  Creatinine   Date Value Ref Range Status   08/01/2017 0.65 0.52 - 1.04 " mg/dL Final   ]    Complete Blood Count:  CBC RESULTS:   Recent Labs   Lab Test  01/19/17   0739   WBC  5.2   RBC  3.34*   HGB  10.0*   HCT  30.6*   MCV  92   MCH  29.9   MCHC  32.7   RDW  17.0*   PLT  531*       Cholesterol  CHOL      236   4/14/2016  HDL      100   4/14/2016  LDL      120   4/14/2016  TRIG       80   4/14/2016  CHOLHDLRATIO      3.2   1/8/2015        Assessment:  1. Post-pancreatectomy diabetes mellitus - partial islet graft function but highly variable insulin needs depending on health status  2.  S/p islet transplant with partial function (autologous)   3.  Possible central adrenal insufficiency due to low cortisol during hypoglycemia (also suspect excess exogenous insulin exposure at that time), has not yet been retested, remains on treatment  4.  Hypoglycemia unawareness        Chantell is a 51 year old with history of chronic pancreatitis who is s/p total pancreatectomy and islet autotransplant, complicated by insulin resistance, and several episodes of severe hypoglycemia with partial islet graft function.       I am extremely concerned by her lack of self care today.  Chantell almost seemed surprised when I said I was worried that she was not taking care of her diabetes.  She has gone up to 11 days at a time without changing her pump site on her download (should be every 3 days), has checked only 9 BG in the past 4 weeks and 4 of those were yesterday, probably in anticipation of today's appointment.  I asked her to check a BG to bolus during this visit because of some issues she was reporting with her pump, and she did not have a meter with her-- says she does not carry this with her.  We discussed how dangerous this is-- she is an insulin dependent diabetic who has had at least two episodes of severe hypoglycemia related to insulin overdose in the past and she needs to have her meter with her at all times.  She expresses concerns that her  worries about her having hypoglycemia at night but  at the same time she almost never checks her BG and has only one reading in the past 4 weeks during the overnight time that she is concerned about, so we have no idea if she is having hypoglycemia at all.  Her A1c is surprisingly normal-- this is indeed concerning for some hypoglycemia that is not being detected and treated.    I asked Chantell today about her mental health.  She acknowledges that her mood is sometimes down, that she is either told or perceives that she is 'lazy' some days because she is at home with medical issues and not working, and that she has stressors affecting her.  In addition to below diabetes care recommendations, I would like her to get in for a consult visit with health psychology for assessment.      I do believe a 670g pump would be a good option for her, given her severe hypo events in the past.  But the caveat is that she will need to be checking BG and managing her diabetes more proactively for this system to work for her.          Plan:  1.  Changes to current diabetes regimen:  Patient Instructions   1)  You are not really using the carb ratio right now.  But based on your current insulin requirements I worry that the 1 unit per 8 grams is too much if you do start using it, so we changed the setting to 1 unit per 20 grams, and you should use this if you are eating and keeping food.    2)  I am worried about not testing often enough-- worried that you could get a bad low blood sugar since you don't feel them well-- and also worried about going too long between site changes.  Goal:  -- Change pump site Tuesday and Friday each week  -- 4 BG tests per day.    3)   Agree that the 670g option would be great for you.  The 'closed loop' mode would automatically adjust the basal rate to help avoid hypoglcyemia, particularly important for you with your history.   But it will require you to test -- no more than 12 hours between tests-- and if your CGM alarm is beeping in order to have the  "closed loop mode work.      4)  It would be a really good idea now to do some tests at 2-3am to see if you are dropping low in the middle of the night.    5)  Make sure to always have your meter with you -- otherwise you can't test if there is a concern about a low or high.    6)  For times that you are off the tube feeds for 30-60 minutes, suspend pump.  If you are off for >1 hour, go into the \"Temp basal\" set up and set the duration that you will be off in hours, and the rate at \"25%\" which is 25% of your usual basal rate.      Follow Up:  January 18 at 9:20am        2.  Frequency of blood sugar checks:  Premeal, bedtime, and additional measurements PRN-- Should have strips sufficient to test 8 times per day given her labiltiy history.    3.  Continue routine follow up for autoislet transplant patients:  Mixed meal test (6 mL/kg BoostHP to max of 360 mL) at 3 months, 6 months, and once a year post transplant.  Hemoglobin A1c levels at these time points and quarterly.    4.  Other issues addressed today:  As above        Follow up:  3 months    Contact me for questions at [phone] or [phone].  Emergency number to reach pediatric endocrinology after hours is [phone].        Amena Maher MD  , Pediatric Endocrinology and Diabetes  [facility]  [facility]      VISIT TIME:  30 minutes of face to face time, >50% spent in counseling and coordination of care    Again, thank you for allowing me to participate in the care of your patient.      Sincerely,    Amena Maher MD      "

## 2017-10-19 NOTE — LETTER
10/19/2017       RE: Chantell Kidd  [address]  [facility] [address]     Dear Colleague,    Thank you for referring your patient, Chantell Kidd, to the [facility] SOLID ORGAN TRANSPLANT at [facility]. Please see a copy of my visit note below.    SP TPIAT 2012  Now with right paramedian pain for past 2 weeks.  Thinks it is a hernia.  No change in diet, bowels, fever or chills.  No precipitating event.    Exam:  Tender about 3 cm to r of upper midline incision.  No palpable defect.    I/P  Will obtain CT scan with oral contrast to assess for hernia.  RTC after scan.    Again, thank you for allowing me to participate in the care of your patient.      Sincerely,    Kevin Alves MD

## 2017-10-19 NOTE — MR AVS SNAPSHOT
"              After Visit Summary   10/19/2017    Chantell Kidd    MRN: [id number]           Patient Information     Date Of Birth          1965        Visit Information        Provider Department      10/19/2017 8:40 AM Amena Maher MD [facility] Solid Organ Transplant        Care Instructions    1)  You are not really using the carb ratio right now.  But based on your current insulin requirements I worry that the 1 unit per 8 grams is too much if you do start using it, so we changed the setting to 1 unit per 20 grams, and you should use this if you are eating and keeping food.    2)  I am worried about not testing often enough-- worried that you could get a bad low blood sugar since you don't feel them well-- and also worried about going too long between site changes.  Goal:  -- Change pump site Tuesday and Friday each week  -- 4 BG tests per day.    3)   Agree that the 670g option would be great for you.  The 'closed loop' mode would automatically adjust the basal rate to help avoid hypoglcyemia, particularly important for you with your history.   But it will require you to test -- no more than 12 hours between tests-- and if your CGM alarm is beeping in order to have the closed loop mode work.      4)  It would be a really good idea now to do some tests at 2-3am to see if you are dropping low in the middle of the night.    5)  Make sure to always have your meter with you -- otherwise you can't test if there is a concern about a low or high.    6)  For times that you are off the tube feeds for 30-60 minutes, suspend pump.  If you are off for >1 hour, go into the \"Temp basal\" set up and set the duration that you will be off in hours, and the rate at \"25%\" which is 25% of your usual basal rate.      Follow Up:  January 18 at 9:20am            Follow-ups after your visit        Your next 10 appointments already scheduled     Oct 19, 2017 10:15 AM CDT   (Arrive by 10:00 AM)   Return Auto [place] with Kevin" "Gaudencio Alves MD   [facility] Solid Organ Transplant ([facility] [place])    [address]  [address] [facility] [address] [phone]              Who to contact     If you have questions or need follow up information about today's clinic visit or your schedule please contact [facility] SOLID ORGAN TRANSPLANT directly at [phone].  Normal or non-critical lab and imaging results will be communicated to you by MyChart, letter or phone within 4 business days after the clinic has received the results. If you do not hear from us within 7 days, please contact the clinic through [employer] or phone. If you have a critical or abnormal lab result, we will notify you by phone as soon as possible.  Submit refill requests through [employer] or call your pharmacy and they will forward the refill request to us. Please allow 3 business days for your refill to be completed.          Additional Information About Your Visit        [employer] Information     [employer] gives you secure access to your electronic health record. If you see a primary care provider, you can also send messages to your care team and make appointments. If you have questions, please call your primary care clinic.  If you do not have a primary care provider, please call [phone] and they will assist you.        Care EveryWhere ID     This is your Care EveryWhere ID. This could be used by other organizations to access your [facility] medical records  [id number]        Your Vitals Were     Pulse Temperature Respirations Height Pulse Oximetry BMI (Body Mass Index)    69 98.2  F (36.8  C) (Oral) 16 5' 2\" (157.5 cm) 100% 27.4 kg/m2       Blood Pressure from Last 3 Encounters:   10/19/17 111/73   08/30/17 111/73   08/01/17 111/70    Weight from Last 3 Encounters:   10/19/17 149 lb 12.8 oz (67.9 kg)   08/30/17 140 lb (63.5 kg)   08/01/17 140 lb 12.8 oz (63.9 kg)              Today, you had the following     No orders found for display       " Primary Care Provider Office Phone # Fax #    Ron Morgan [phone]       [address] [place] [address]  [facility] [address]        Equal Access to Services     BRIDGETTE JOHNSON : Haddavid jose maria mims sofiao Socraig, waaxda luqadaha, qaybta kaalmada nissa, bambi hawkins laAlyssajose flowers. So [facility] [phone].    ATENCIÓN: Si habla español, tiene a webb disposición servicios gratuitos de asistencia lingüística. Llame al [phone].    We comply with applicable federal civil rights laws and [place] laws. We do not discriminate on the basis of race, color, national origin, age, disability, sex, sexual orientation, or gender identity.            Thank you!     Thank you for choosing [facility] SOLID ORGAN TRANSPLANT  for your care. Our goal is always to provide you with excellent care. Hearing back from our patients is one way we can continue to improve our services. Please take a few minutes to complete the written survey that you may receive in the mail after your visit with us. Thank you!             Your Updated Medication List - Protect others around you: Learn how to safely use, store and throw away your medicines at www.disposemymeds.org.          This list is accurate as of: 10/19/17  9:22 AM.  Always use your most recent med list.                   Brand Name Dispense Instructions for use Diagnosis    acetaminophen 500 MG Caps      Take 1,000 mg by mouth three times a day as needed.        alendronate 70 MG tablet    FOSAMAX    4 tablet    Take 1 tablet (70 mg) by mouth every 7 days On Sundays take first thing in the morning with plain water and remain upright for at least 30 minutes and until after first food of the day  Do not restart Fosamax (alendronate) until your difficulty swallowing has resolved and you have finished the entire course of fluconazole (Diflucan).    Osteoporosis       alum & mag hydroxide-simethicone 200-200-25 MG Chew chewable tablet    GELUSIL    100  tablet    CHEW AND SWALLOW 2 CHEWS EVERY 4 HOURS AS NEEDED FOR INDIGESTION    Abdominal pain       amylase-lipase-protease 05235 UNITS Cpep    CREON 12    2160 capsule    Take 6 capsules with meals and 3 with snacks.  This is up to 24 capsules per day.    Exocrine pancreatic insufficiency       aspirin 81 MG tablet      Take 81 mg by mouth daily.        blood glucose monitoring lancets     102 each    by Lancet route. Use to test blood sugar daily or as directed.    Chronic abdominal pain       * blood glucose monitoring test strip    no brand specified    240 each    Use to test blood glucoses 6-8 times per day.    Post-pancreatectomy diabetes (H)       * blood glucose monitoring test strip    NOEMI CONTOUR NEXT    240 each    Use to test blood sugar 8 times daily.    Post-pancreatectomy diabetes (H)       * BOOST HIGH PROTEIN Liqd      After above baseline labs are drawn, give: 6 mL/kg to maximum of 360 mL; the beverage is to be consumed within 5 minutes.    Post-pancreatectomy diabetes (H), Pancreatic insufficiency, Vitamin D deficiency       * BOOST HIGH PROTEIN Liqd      After above baseline labs are drawn, give: 6 mL/kg to maximum of 360 mL; the beverage is to be consumed within 5 minutes.    Post-pancreatectomy diabetes (H), Pancreatic insufficiency       * BOOST HIGH PROTEIN Liqd      Also can use Ensure clear (available over the counter)    Pancreatic insufficiency       * BOOST HIGH PROTEIN Liqd      After above baseline labs are drawn, give: 6 mL/kg to maximum of 360 mL; the beverage is to be consumed within 5 minutes.    Post-pancreatectomy diabetes (H), Vitamin D deficiency, unspecified       cyclobenzaprine 5 MG tablet    FLEXERIL    42 tablet    Take 1 tablet (5 mg) by mouth 3 times daily as needed for muscle spasms    Abdominal pain, generalized       diclofenac 1 % Gel topical gel    VOLTAREN     2 g Apply 2 g to skin four times a day as needed (to affected upper extremity joint(s)). Maximum 8g/day  per joint, 16g/day total.        dicyclomine 10 MG capsule    BENTYL    40 capsule    TAKE ONE CAPSULE BY MOUTH EVERY 6 HOURS AS NEEDED    Abdominal pain, epigastric       docusate sodium 100 MG capsule    DOK    120 capsule    Take 1 capsule (100 mg) by mouth daily as needed for constipation    AP (abdominal pain)       dronabinol 2.5 MG capsule    MARINOL    56 capsule    Take 2 capsules (5 mg) by mouth 2 times daily as needed    Routine health maintenance       * DULoxetine 30 MG EC capsule    CYMBALTA    90 capsule    Take 1 capsule (30 mg) by mouth daily With 60mg capsule for total dose of 90mg    Major depressive disorder, recurrent episode, moderate (H), CIERRA (generalized anxiety disorder)       * DULoxetine 60 MG EC capsule    CYMBALTA    90 capsule    Take 1 capsule (60 mg) by mouth daily With 30mg capsule for total dose of 90mg    Major depressive disorder, recurrent episode, moderate (H), CIERRA (generalized anxiety disorder)       fluconazole 200 MG tablet    DIFLUCAN    15 tablet    Take 2 tabs the first day and 1 tab for the next 13 days        furosemide 20 MG tablet    LASIX    180 tablet    Take 1 tablet (20 mg) by mouth 2 times daily    Edema, unspecified type       hydrocortisone 10 MG tablet    CORTEF    60 tablet    Take 10 mg in the morning and 10 mg at bedtime. Watch for hypoglycemia recurrence.    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    Chronic abdominal pain       levothyroxine 112 MCG tablet    SYNTHROID/LEVOTHROID    90 tablet    Take 1 tablet (112 mcg) by mouth daily    Hypothyroidism       linaclotide 145 MCG capsule    LINZESS    30 capsule    Take 1 capsule (145 mcg) by mouth every morning (before breakfast)     Constipation, unspecified constipation type, Chronic back pain, unspecified back location, unspecified back pain laterality, Physical deconditioning, Primary insomnia       metoclopramide 5 MG tablet    REGLAN    100 tablet    Take 2 tablets (10 mg) by mouth 3 times daily (before meals    Routine health maintenance       morphine 0.1% in solosite topical gel     25 g    Place 1 g onto the skin 4 times daily as needed for pain    Generalized abdominal pain       nystatin 535134 unit/mL Susp suspension    MYCOSTATIN    60 mL    Take 1 mL (100,000 Units) by mouth 4 times daily    Odynophagia       ondansetron 4 MG ODT tab    ZOFRAN-ODT    60 tablet    DISSOLVE ONE TABLET ON THE TONGUE EVERY 6 HOURS AS NEEDED FOR NAUSEA    Nausea       oxyCODONE 5 MG IR tablet    ROXICODONE    50 tablet    Take 1 tablet (5 mg) by mouth every 6 hours as needed    S/P hernia repair       pantoprazole 40 MG EC tablet    PROTONIX    180 tablet    Take 1 tablet (40 mg) by mouth 2 times daily    H. pylori infection       polyethylene glycol Packet    MIRALAX/GLYCOLAX    14 each    Take 1 packet by mouth 2 times daily as needed for constipation    Chronic constipation       potassium chloride SA 20 MEQ CR tablet    K-DUR/KLOR-CON M    90 tablet    Take 1 tablet (20 mEq) by mouth daily    Hypokalemia       pregabalin 150 MG capsule    LYRICA    90 capsule    Take 1 capsule (150 mg) by mouth 3 times daily    Chronic generalized abdominal pain       senna 8.6 MG tablet    SENNA LAX    90 tablet    Take 1-2 tablets by mouth daily as needed for constipation    Other constipation       sodium bicarbonate 650 MG tablet     135 tablet    Take one-half tablet (325 mg), via feeding tube every 4 hours.    Malnutrition (H)       SUMAtriptan 50 MG tablet    IMITREX    30 tablet    Take 1 tablet (50 mg) by mouth at onset of headache for migraine Take 1 Tab by mouth Once as needed for Migraine Headache. May repeat after two hours.  Maximum dose 200 mg/24  hours.    Migraine       topiramate 100 MG tablet    TOPAMAX    180 tablet    Take 1 tablet (100 mg) by mouth 2 times daily    Migraine, unspecified, not intractable, without status migrainosus       traZODone 100 MG tablet    DESYREL    100 tablet    Take 1-2 tablets (100-200 mg) by mouth At Bedtime *AN HOUR BEFORE*    Primary insomnia, Constipation, unspecified constipation type, Chronic back pain, unspecified back location, unspecified back pain laterality, Physical deconditioning       * Notice:  This list has 8 medication(s) that are the same as other medications prescribed for you. Read the directions carefully, and ask your doctor or other care provider to review them with you.

## 2017-10-19 NOTE — MR AVS SNAPSHOT
After Visit Summary   10/19/2017    Chantell Kidd    MRN: [id number]           Patient Information     Date Of Birth          1965        Visit Information        Provider Department      10/19/2017 10:15 AM Kevin Alves MD [facility] Solid Organ Transplant        Today's Diagnoses     Post-pancreatectomy diabetes (H)    -  1       Follow-ups after your visit        Your next 10 appointments already scheduled     Jan 18, 2018  9:20 AM CST   (Arrive by 9:05 AM)   Return Auto [place]let with Aemna Maher MD   [facility] Solid Organ Transplant ([facility])    [address]  [address] [facility] [address] [phone]              Who to contact     If you have questions or need follow up information about today's clinic visit or your schedule please contact [facility] SOLID ORGAN TRANSPLANT directly at [phone].  Normal or non-critical lab and imaging results will be communicated to you by MyChart, letter or phone within 4 business days after the clinic has received the results. If you do not hear from us within 7 days, please contact the clinic through [employer] or phone. If you have a critical or abnormal lab result, we will notify you by phone as soon as possible.  Submit refill requests through [employer] or call your pharmacy and they will forward the refill request to us. Please allow 3 business days for your refill to be completed.          Additional Information About Your Visit        MyChart Information     [employer] gives you secure access to your electronic health record. If you see a primary care provider, you can also send messages to your care team and make appointments. If you have questions, please call your primary care clinic.  If you do not have a primary care provider, please call [phone] and they will assist you.        Care EveryWhere ID     This is your Care EveryWhere ID. This could be used by other organizations to access your  [facility] medical records  [id number]         Blood Pressure from Last 3 Encounters:   10/19/17 111/73   08/30/17 111/73   08/01/17 111/70    Weight from Last 3 Encounters:   10/19/17 67.9 kg (149 lb 12.8 oz)   08/30/17 63.5 kg (140 lb)   08/01/17 63.9 kg (140 lb 12.8 oz)              Today, you had the following     No orders found for display       Primary Care Provider Office Phone # Fax #    Ron Kvng Morgan [phone]       [address] [place] [address] [address]  [facility] [address]        Equal Access to Services     [facility] ELIZABETH : Hadii jose maria blacko Socraig, waaxda luqadaha, qaybta kaalmada nissa, bambi flowers. So [facility] [phone].    ATENCIÓN: Si habla español, tiene a webb disposición servicios gratuitos de asistencia lingüística. [facility] [phone].    We comply with applicable federal civil rights laws and [place] laws. We do not discriminate on the basis of race, color, national origin, age, disability, sex, sexual orientation, or gender identity.            Thank you!     Thank you for choosing [facility] SOLID ORGAN TRANSPLANT  for your care. Our goal is always to provide you with excellent care. Hearing back from our patients is one way we can continue to improve our services. Please take a few minutes to complete the written survey that you may receive in the mail after your visit with us. Thank you!             Your Updated Medication List - Protect others around you: Learn how to safely use, store and throw away your medicines at www.disposemymeds.org.          This list is accurate as of: 10/19/17 11:59 AM.  Always use your most recent med list.                   Brand Name Dispense Instructions for use Diagnosis    acetaminophen 500 MG Caps      Take 1,000 mg by mouth three times a day as needed.        alendronate 70 MG tablet    FOSAMAX    4 tablet    Take 1 tablet (70 mg) by mouth every 7 days On Sundays take first thing in the morning with  plain water and remain upright for at least 30 minutes and until after first food of the day  Do not restart Fosamax (alendronate) until your difficulty swallowing has resolved and you have finished the entire course of fluconazole (Diflucan).    Osteoporosis       alum & mag hydroxide-simethicone 200-200-25 MG Chew chewable tablet    GELUSIL    100 tablet    CHEW AND SWALLOW 2 CHEWS EVERY 4 HOURS AS NEEDED FOR INDIGESTION    Abdominal pain       amylase-lipase-protease 26999 UNITS Cpep    CREON 12    2160 capsule    Take 6 capsules with meals and 3 with snacks.  This is up to 24 capsules per day.    Exocrine pancreatic insufficiency       aspirin 81 MG tablet      Take 81 mg by mouth daily.        blood glucose monitoring lancets     102 each    by Lancet route. Use to test blood sugar daily or as directed.    Chronic abdominal pain       * blood glucose monitoring test strip    no brand specified    240 each    Use to test blood glucoses 6-8 times per day.    Post-pancreatectomy diabetes (H)       * blood glucose monitoring test strip    NOEMI CONTOUR NEXT    240 each    Use to test blood sugar 8 times daily.    Post-pancreatectomy diabetes (H)       * BOOST HIGH PROTEIN Liqd      After above baseline labs are drawn, give: 6 mL/kg to maximum of 360 mL; the beverage is to be consumed within 5 minutes.    Post-pancreatectomy diabetes (H), Pancreatic insufficiency, Vitamin D deficiency       * BOOST HIGH PROTEIN Liqd      After above baseline labs are drawn, give: 6 mL/kg to maximum of 360 mL; the beverage is to be consumed within 5 minutes.    Post-pancreatectomy diabetes (H), Pancreatic insufficiency       * BOOST HIGH PROTEIN Liqd      Also can use Ensure clear (available over the counter)    Pancreatic insufficiency       * BOOST HIGH PROTEIN Liqd      After above baseline labs are drawn, give: 6 mL/kg to maximum of 360 mL; the beverage is to be consumed within 5 minutes.    Post-pancreatectomy diabetes (H),  Vitamin D deficiency, unspecified       cyclobenzaprine 5 MG tablet    FLEXERIL    42 tablet    Take 1 tablet (5 mg) by mouth 3 times daily as needed for muscle spasms    Abdominal pain, generalized       diclofenac 1 % Gel topical gel    VOLTAREN     2 g Apply 2 g to skin four times a day as needed (to affected upper extremity joint(s)). Maximum 8g/day per joint, 16g/day total.        dicyclomine 10 MG capsule    BENTYL    40 capsule    TAKE ONE CAPSULE BY MOUTH EVERY 6 HOURS AS NEEDED    Abdominal pain, epigastric       docusate sodium 100 MG capsule    DOK    120 capsule    Take 1 capsule (100 mg) by mouth daily as needed for constipation    AP (abdominal pain)       dronabinol 2.5 MG capsule    MARINOL    56 capsule    Take 2 capsules (5 mg) by mouth 2 times daily as needed    Routine health maintenance       * DULoxetine 30 MG EC capsule    CYMBALTA    90 capsule    Take 1 capsule (30 mg) by mouth daily With 60mg capsule for total dose of 90mg    Major depressive disorder, recurrent episode, moderate (H), CIERRA (generalized anxiety disorder)       * DULoxetine 60 MG EC capsule    CYMBALTA    90 capsule    Take 1 capsule (60 mg) by mouth daily With 30mg capsule for total dose of 90mg    Major depressive disorder, recurrent episode, moderate (H), CIERRA (generalized anxiety disorder)       fluconazole 200 MG tablet    DIFLUCAN    15 tablet    Take 2 tabs the first day and 1 tab for the next 13 days        furosemide 20 MG tablet    LASIX    180 tablet    Take 1 tablet (20 mg) by mouth 2 times daily    Edema, unspecified type       hydrocortisone 10 MG tablet    CORTEF    60 tablet    Take 10 mg in the morning and 10 mg at bedtime. Watch for hypoglycemia recurrence.    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    Chronic abdominal pain       levothyroxine 112 MCG tablet    SYNTHROID/LEVOTHROID    90 tablet    Take 1 tablet (112 mcg) by mouth daily    Hypothyroidism       linaclotide 145 MCG capsule    LINZESS    30 capsule    Take 1 capsule (145 mcg) by mouth every morning (before breakfast)    Constipation, unspecified constipation type, Chronic back pain, unspecified back location, unspecified back pain laterality, Physical deconditioning, Primary insomnia       metoclopramide 5 MG tablet    REGLAN    100 tablet    Take 2 tablets (10 mg) by mouth 3 times daily (before meals    Routine health maintenance       morphine 0.1% in solosite topical gel     25 g    Place 1 g onto the skin 4 times daily as needed for pain    Generalized abdominal pain       nystatin 896235 unit/mL Susp suspension    MYCOSTATIN    60 mL    Take 1 mL (100,000 Units) by mouth 4 times daily    Odynophagia       ondansetron 4 MG ODT tab    ZOFRAN-ODT    60 tablet    DISSOLVE ONE TABLET ON THE TONGUE EVERY 6 HOURS AS NEEDED FOR NAUSEA    Nausea       oxyCODONE 5 MG IR tablet    ROXICODONE    50 tablet    Take 1 tablet (5 mg) by mouth every 6 hours as needed    S/P hernia repair       pantoprazole 40 MG EC tablet    PROTONIX    180 tablet    Take 1 tablet (40 mg) by mouth 2 times daily    H. pylori infection       polyethylene glycol Packet    MIRALAX/GLYCOLAX    14 each    Take 1 packet by mouth 2 times daily as needed for constipation    Chronic constipation       potassium chloride SA 20 MEQ CR tablet    K-DUR/KLOR-CON M    90 tablet    Take 1 tablet (20 mEq) by mouth daily    Hypokalemia       pregabalin 150 MG capsule    LYRICA    90 capsule    Take 1 capsule (150 mg) by mouth 3 times daily    Chronic generalized abdominal pain       senna 8.6 MG tablet    SENNA LAX    90 tablet    Take 1-2 tablets by mouth daily as needed for constipation    Other constipation       sodium  bicarbonate 650 MG tablet     135 tablet    Take one-half tablet (325 mg), via feeding tube every 4 hours.    Malnutrition (H)       SUMAtriptan 50 MG tablet    IMITREX    30 tablet    Take 1 tablet (50 mg) by mouth at onset of headache for migraine Take 1 Tab by mouth Once as needed for Migraine Headache. May repeat after two hours.  Maximum dose 200 mg/24 hours.    Migraine       topiramate 100 MG tablet    TOPAMAX    180 tablet    Take 1 tablet (100 mg) by mouth 2 times daily    Migraine, unspecified, not intractable, without status migrainosus       traZODone 100 MG tablet    DESYREL    100 tablet    Take 1-2 tablets (100-200 mg) by mouth At Bedtime *AN HOUR BEFORE*    Primary insomnia, Constipation, unspecified constipation type, Chronic back pain, unspecified back location, unspecified back pain laterality, Physical deconditioning       * Notice:  This list has 8 medication(s) that are the same as other medications prescribed for you. Read the directions carefully, and ask your doctor or other care provider to review them with you.

## 2017-10-19 NOTE — NURSING NOTE
"Chief Complaint   Patient presents with     TP-IAT Transplant     follow up POD 5 years       Initial /73  Pulse 69  Temp 98.2  F (36.8  C) (Oral)  Resp 16  Ht 1.575 m (5' 2\")  Wt 67.9 kg (149 lb 12.8 oz)  SpO2 100%  BMI 27.4 kg/m2 Estimated body mass index is 27.4 kg/(m^2) as calculated from the following:    Height as of this encounter: 1.575 m (5' 2\").    Weight as of this encounter: 67.9 kg (149 lb 12.8 oz).   PT blood sugar was 185 @ 9:00AM    "

## 2017-10-19 NOTE — PATIENT INSTRUCTIONS
"1)  You are not really using the carb ratio right now.  But based on your current insulin requirements I worry that the 1 unit per 8 grams is too much if you do start using it, so we changed the setting to 1 unit per 20 grams, and you should use this if you are eating and keeping food.    2)  I am worried about not testing often enough-- worried that you could get a bad low blood sugar since you don't feel them well-- and also worried about going too long between site changes.  Goal:  -- Change pump site Tuesday and Friday each week  -- 4 BG tests per day.    3)   Agree that the 670g option would be great for you.  The 'closed loop' mode would automatically adjust the basal rate to help avoid hypoglcyemia, particularly important for you with your history.   But it will require you to test -- no more than 12 hours between tests-- and if your CGM alarm is beeping in order to have the closed loop mode work.      4)  It would be a really good idea now to do some tests at 2-3am to see if you are dropping low in the middle of the night.    5)  Make sure to always have your meter with you -- otherwise you can't test if there is a concern about a low or high.    6)  For times that you are off the tube feeds for 30-60 minutes, suspend pump.  If you are off for >1 hour, go into the \"Temp basal\" set up and set the duration that you will be off in hours, and the rate at \"25%\" which is 25% of your usual basal rate.      Follow Up:  January 18 at 9:20am    "

## 2017-10-20 NOTE — PROGRESS NOTES
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     Post-pancreatectomy diabetes (H)     Type 1 diabetes mellitus (H)     Migraine     Abdominal muscle strain     CIERRA (generalized anxiety disorder)     Major depressive disorder, recurrent episode, moderate (H)     CMC DJD(carpometacarpal degenerative joint disease), localized primary     Exocrine pancreatic insufficiency     Hypothyroidism     Hypoglycemia     Mood disorder due to a general medical condition     Decreased oral intake     Odynophagia     Iron deficiency     Anemia, iron deficiency     Adrenal insufficiency (H)     S/P hernia repair     Nausea       HPI:  Chantell is a 51 year old female here for follow up of total pancreatectomy and islet autotransplant performed on January 6, 2012.  At the time of the procedure, the patient received 420,000 IEQ, or 5,438 IEQ/kg body weight.  She did not have diabetes before the procedure.  Early post-operative course was complicated by RLQ with appendicitis, eventually required appendectomy.    Despite the high islet mass transplanted, Chantell's post-operative course was initially remarkable for high insulin needs.  She in fact does have islet function, as previously documented by mixed meal tests, but she was insulin resistant, requiring high doses of insulin when on MDI therapy.  She also had frequent day to day variability, and fluctuating patterns with illness and healthier times, as well as a complex regimen, all of which make her an excellent candidate for an insulin pump which she started in Feb 2014.  Extremely concerning was an episode of severe hypoglycemia in November 2013 at which time  she was found unconscious.  Suspect at that time she was on too much basal insulin and because she was ill and not eating at the time, dropped far too low overnight.   In early 2014, she was started on an insulin pump with CGM.  Remarkably, her insulin needs have dropped dramatically on an insulin pump.  She was then relatively stable until 2016.  She was again admitted to the hospital on March 15 and March 29, 2016 for hypoglycemia, that appears to be secondary to both exogenous insulin and possible central adrenal insufficiency.   At that time she had the following lab findings:  On 3/29/16, elevated insulin with low C-peptide during BG 42 mg/dL around 5pm, and then again same pattern with BG 50s at 10pm.  Chantell is pretty clear that she did not take insuiln that day on 3/29/16. She also had three cortisol levels-- including one during hypoglycemia, one at around 4am (possibly also during hypoglycemia) and one 8am draw that were all low-- all 0.6- 1.6 range.    Of note, she did have a kenalog injection one week earlier on 3/24/16, just a few days prior to that draw and was also receiving narcotics in hospital (but not at home).  She has since had another severe hypogylcemic episode in Jan 2017 -- did not lose consciousness but was disoriented and unable to get help for herself at the store.    Picture is also complicated by non-diabetes history which includes the following :  - 7/6/2016 EGD identified diffuse candidiasis through entire esophagous.  Pt has also had recurrent oral thrush in 2016  - Recurrent chronic abdominal pain  - Recurrent vomiting of unclear etiology but G-T placed 10/2016 and converted to GJ 11/2016 with symptomatic improvement  Unclear if she has any gastroparesis.  Suboptimal study in March 2016 showed 100% retention at 1 hour and then rapid emptying.    - Hernia repair performed by general surgery 9/15/16 (TPIAT team not involved).    - Chronic abdominal pain      New history today:  1)   Diabetes:    Struggling with adherence to diabetes care plan.  Has 4 glucose checks yesterday but only 5 done for the entire 27 days prior to this.  Pump indicates that she is changing her site every 5-11 days (should be 3).  Rarely boluses.  No food coverage although she says that is mainly because she can't keep down food, that when she does eat it comes back up.  TF are Isosource 1.5 at 40 mL/hour.  She says her  worries because she has hypoglycemia episodes in the middle of the night, but hard to say when or if she is truly hypoglycemic since we have so few BG checks.  She has not had any new severe hypoglycemia but does have a history of not recognizing lows early and having severe hypo events.  There are some highs yesterday with no bolus on the pump, but she does tell me she took a 4 unit injection because her pump was not bolusing correctly.  This does fit with her #s which drops from 300s to 100s in 2 hours when she reports the 4 unit dose.  She does have a new pump on order from [employer].  She would like to change to 670g next year.  If she takes a shower or stops TF to eat for a short period of time she suspends the pump, but she has no plan for how to reduce basal for longer periods off TF and in fact left her usual basal rate running last night even though she was off feeds.      Current insuiln:  on insulin pump at settings of:  Basal 12a 0.80  Bolus;  I:C 8g- does not use because she only gets nutrition from TF right now  , ISF 50 mg/dL, Target  mg/dL  Total daily insulin dose:  14.9 units/day, 1% bolus:  14.8 basal + 0.1 bolus    Feeds:  Isosource 1.5 at 40 mL/hour  Wt Readings from Last 4 Encounters:   10/19/17 149 lb 12.8 oz (67.9 kg)   08/30/17 140 lb (63.5 kg)   08/01/17 140 lb 12.8 oz (63.9 kg)   06/10/17 132 lb (59.9 kg)     Body mass index is 27.4 kg/(m^2).      Recent hemoglobin A1c levels:  Lab Results   Component Value Date    A1C 6.4 10/19/2017    A1C 5.9 01/19/2017    A1C  6.8 11/16/2016    A1C 6.7 09/15/2016    A1C 9.1 08/03/2016           2)  Adrenal insufficiency:  Still unclear if this was transient or true central AI but we have been treating her regardless due to SHE history.  Since our last visit, we have maintained her on 20 mg/day (10 BID) of cortef, with Body surface area is 1.72 meters squared. For daily dose of : 12 mg/m2/day.   This was not addressed further today as we focused on the diabetes but we do need to wean this      3)  Other new concerns include:   Still has chronic abdominal pain that is different from her previous pancreatitis pain.  She feels like she may have another hernia and is seeing Dr Alves after me.  She is not taking any narcotics.         Review of systems:  Review Of Systems  Skin: negative  Eyes: negative  Ears/Nose/Throat: negative  Respiratory: No shortness of breath, dyspnea on exertion, cough, or hemoptysis  Cardiovascular: negative  Gastrointestinal: chronic abdominal pain + hernia  Genitourinary: negative  Musculoskeletal: negative  Neurologic: negative  Psychiatric: negative  Hematologic/Lymphatic/Immunologic: negative  Endocrine: as above    Past Medical and Surgical History:  Past Medical History:   Diagnosis Date     Chronic abdominal pain      Chronic pancreatitis (H)     S/P pancreatectomy     Depression with anxiety      Diabetes mellitus (H) 1/2012     Gastro-oesophageal reflux disease      Hypothyroidism 4/23/2015     Kidney stones      Low serum cortisol level (H)      Migraines      Other chronic pain     STOMACH     Other chronic pain     LUMBAR SPINE     Spasm of sphincter of Oddi      Past Surgical History:   Procedure Laterality Date     ARTHROPLASTY CARPOMETACARPAL (THUMB JOINT)  5/2/2014    Procedure: ARTHROPLASTY CARPOMETACARPAL (THUMB JOINT);  Surgeon: Carina Panda MD;  Location: [facility]     CHOLECYSTECTOMY  2004     COLONOSCOPY  7/18/2014    Procedure: COLONOSCOPY;  Surgeon: Aurora Sahu MD;   Location: [facility]     COLONOSCOPY N/A 8/1/2017    Procedure: COLONOSCOPY;  Colonoscopy and upper endoscopy;  Surgeon: Deirdre Harris MD;  [facility]: [facility]     ENDOSCOPIC RETROGRADE CHOLANGIOPANCREATOGRAM       ENDOSCOPIC RETROGRADE CHOLANGIOPANCREATOGRAM  4/19/2011    Procedure:ENDOSCOPIC RETROGRADE CHOLANGIOPANCREATOGRAM; Pancreatic Stent Placement       ENDOSCOPIC RETROGRADE CHOLANGIOPANCREATOGRAM  5/26/2011    Procedure:ENDOSCOPIC RETROGRADE CHOLANGIOPANCREATOGRAM; with Pancreatic Stent Removal; Surgeon:DALE MIMS; Location[facility]     ENDOSCOPY UPPER, COLONOSCOPY, COMBINED  4/25/2012    Procedure:COMBINED ENDOSCOPY UPPER, COLONOSCOPY; Enteroscopy with Bile Duct Stent Removal, Colonoscopy  *[facility] Safe Room*; Surgeon:GRACY GODWINIQ; Location[facility]     ESOPHAGOSCOPY, GASTROSCOPY, DUODENOSCOPY (EGD), COMBINED  5/26/2011    Procedure:COMBINED ESOPHAGOSCOPY, GASTROSCOPY, DUODENOSCOPY (EGD); Surgeon:DALE MIMS; [facility]     ESOPHAGOSCOPY, GASTROSCOPY, DUODENOSCOPY (EGD), COMBINED N/A 10/30/2014    Procedure: COMBINED ESOPHAGOSCOPY, GASTROSCOPY, DUODENOSCOPY (EGD), BIOPSY SINGLE OR MULTIPLE;  Surgeon: Sarai Moon MD;  [facility]     ESOPHAGOSCOPY, GASTROSCOPY, DUODENOSCOPY (EGD), COMBINED Left 7/6/2015    Procedure: COMBINED ESOPHAGOSCOPY, GASTROSCOPY, DUODENOSCOPY (EGD), BIOPSY SINGLE OR MULTIPLE;  Surgeon: Thomas Estrada MD;  [facility]: [facility]     ESOPHAGOSCOPY, GASTROSCOPY, DUODENOSCOPY (EGD), COMBINED N/A 7/8/2016    Procedure: COMBINED ESOPHAGOSCOPY, GASTROSCOPY, DUODENOSCOPY (EGD), BIOPSY SINGLE OR MULTIPLE;  Surgeon: Eloy Klein MD;  [facility]     ESOPHAGOSCOPY, GASTROSCOPY, DUODENOSCOPY (EGD), COMBINED N/A 8/4/2016    Procedure: COMBINED ESOPHAGOSCOPY, GASTROSCOPY, DUODENOSCOPY (EGD), BIOPSY SINGLE OR MULTIPLE;  Surgeon: Jason Brown MD;  [facility]     ESOPHAGOSCOPY, GASTROSCOPY, DUODENOSCOPY (EGD), COMBINED  N/A 8/1/2017    Procedure: COMBINED ESOPHAGOSCOPY, GASTROSCOPY, DUODENOSCOPY (EGD);;  Surgeon: Deirdre Harris MD;  [facility]     GYN SURGERY      Hysterectomy and USO     [facility] ENDOSCOPY W EUS  7/20/2011    Procedure:COMBINED ENDOSCOPIC ULTRASOUND, ESOPHAGOSCOPY, GASTROSCOPY, DUODENOSCOPY (EGD); Surgeon:DARVIN DONOHUE; [facility]     HERNIORRHAPHY VENTRAL N/A 9/15/2016    Procedure: HERNIORRHAPHY VENTRAL;  Surgeon: Juanita Bernabe MD;  [facility]     HYSTERECTOMY  1997 or 1998    USO     INCISION AND DRAINAGE ABDOMEN WASHOUT, COMBINED  8/16/2012    Procedure: COMBINED INCISION AND DRAINAGE ABDOMEN WASHOUT;  ,debridement and Drainage Post Appendectomy;  Surgeon: Ron Austin MD;  [facility]     INJECT TRANSVERSUS ABDOMINIS PLANE (TAP) BLOCK BILATERAL Bilateral 5/26/2016    Procedure: INJECT TRANSVERSUS ABDOMINIS PLANE (TAP) BLOCK BILATERAL;  Surgeon: Leonard Mccallum MD;  [facility]     LAPAROSCOPIC APPENDECTOMY  7/30/2012    Procedure: LAPAROSCOPIC APPENDECTOMY;  Open Appendectomy;  Surgeon: Ron Austin MD;  [facility]     PANCREATECTOMY, TRANSPLANT AUTO ISLET CELL, COMBINED  1/6/2012    Procedure:COMBINED PANCREATECTOMY, TRANSPLANT AUTO ISLET CELL; Total  Pancreatectomy, Auto Islet Transplant, splenectomy, 18fr. transgastric-jejunal feeding tube placement, liver biopsy; Surgeon:PALAK LEE; [facility]     REPLACE GASTROSTOMY TUBE, PERCUTANEOUS N/A 8/30/2017    Procedure: REPLACE GASTROSTOMY TUBE, PERCUTANEOUS;  GJ Tube Change;  Surgeon: Jose Nath PA-C;  [facility]     SPLENECTOMY         Family History:  New changes since last visit:  none  Family History   Problem Relation Age of Onset     Hypertension Mother      DIABETES Mother      OSTEOPOROSIS Mother      CANCER Father      pancreatic cancer     DIABETES Maternal Grandmother      Cardiovascular Maternal Grandmother      CANCER Maternal Grandfather       "lung cancer     CANCER Sister      brain     CANCER Sister      liver cancer       Social History:  Social History     Social History Narrative     Unchanged. Lives in [place] with her .  Has 5 grandchildren       Physical Exam:  Vitals: /73  Pulse 69  Temp 98.2  F (36.8  C) (Oral)  Resp 16  Ht 5' 2\" (157.5 cm)  Wt 149 lb 12.8 oz (67.9 kg)  SpO2 100%  BMI 27.4 kg/m2  BMI= Body mass index is 27.4 kg/(m^2).     General:  Well-appearing, NAD  Head: NC/AT  Eyes: sclera white  Abdomen is tender  Neuro:  Normal mental status, normal gross motor  Psych:  Communicative, with normal affect     Results:  Mixed Meal Test:  C Peptide   Date Value Ref Range Status   10/19/2017 2.4 0.9 - 6.9 ng/mL Final   01/19/2017 1.9 0.9 - 6.9 ng/mL Final   01/19/2017 2.2 0.9 - 6.9 ng/mL Final   01/19/2017 1.5 0.9 - 6.9 ng/mL Final   04/07/2016 2.3 0.9 - 6.9 ng/mL Final     Glucose   Date Value Ref Range Status   10/19/2017 202 (H) 70 - 99 mg/dL Final   08/01/2017 120 (H) 70 - 99 mg/dL Final   01/19/2017 140 (H) 70 - 99 mg/dL Final   01/19/2017 153 (H) 70 - 99 mg/dL Final   01/19/2017 202 (H) 70 - 99 mg/dL Final     Comment:     Fasting specimen       Hemoglobin A1c  Lab Results   Component Value Date    A1C 6.4 10/19/2017    A1C 5.9 01/19/2017    A1C 6.8 11/16/2016    A1C 6.7 09/15/2016    A1C 9.1 08/03/2016         Liver enzymes  AST       17   1/19/2017  ALT       20   1/19/2017  BILICONJ      0.0   5/20/2014   BILITOTAL      0.2   1/19/2017  ALBUMIN      3.4   1/19/2017  PROTTOTAL      6.6   1/19/2017   ALKPHOS       64   1/19/2017    Creatinine:  Creatinine   Date Value Ref Range Status   08/01/2017 0.65 0.52 - 1.04 mg/dL Final   ]    Complete Blood Count:  CBC RESULTS:   Recent Labs   Lab Test  01/19/17   0739   WBC  5.2   RBC  3.34*   HGB  10.0*   HCT  30.6*   MCV  92   MCH  29.9   MCHC  32.7   RDW  17.0*   PLT  531*       Cholesterol  CHOL      236   4/14/2016  HDL      100   4/14/2016  LDL      120   " 4/14/2016  TRIG       80   4/14/2016  CHOLHDLRATIO      3.2   1/8/2015        Assessment:  1. Post-pancreatectomy diabetes mellitus - partial islet graft function but highly variable insulin needs depending on health status  2.  S/p islet transplant with partial function (autologous)   3.  Possible central adrenal insufficiency due to low cortisol during hypoglycemia (also suspect excess exogenous insulin exposure at that time), has not yet been retested, remains on treatment  4.  Hypoglycemia unawareness        Chantell is a 51 year old with history of chronic pancreatitis who is s/p total pancreatectomy and islet autotransplant, complicated by insulin resistance, and several episodes of severe hypoglycemia with partial islet graft function.       I am extremely concerned by her lack of self care today.  Chantell almost seemed surprised when I said I was worried that she was not taking care of her diabetes.  She has gone up to 11 days at a time without changing her pump site on her download (should be every 3 days), has checked only 9 BG in the past 4 weeks and 4 of those were yesterday, probably in anticipation of today's appointment.  I asked her to check a BG to bolus during this visit because of some issues she was reporting with her pump, and she did not have a meter with her-- says she does not carry this with her.  We discussed how dangerous this is-- she is an insulin dependent diabetic who has had at least two episodes of severe hypoglycemia related to insulin overdose in the past and she needs to have her meter with her at all times.  She expresses concerns that her  worries about her having hypoglycemia at night but at the same time she almost never checks her BG and has only one reading in the past 4 weeks during the overnight time that she is concerned about, so we have no idea if she is having hypoglycemia at all.  Her A1c is surprisingly normal-- this is indeed concerning for some hypoglycemia that is  not being detected and treated.    I asked Chantell today about her mental health.  She acknowledges that her mood is sometimes down, that she is either told or perceives that she is 'lazy' some days because she is at home with medical issues and not working, and that she has stressors affecting her.  In addition to below diabetes care recommendations, I would like her to get in for a consult visit with health psychology for assessment.      I do believe a 670g pump would be a good option for her, given her severe hypo events in the past.  But the caveat is that she will need to be checking BG and managing her diabetes more proactively for this system to work for her.          Plan:  1.  Changes to current diabetes regimen:  Patient Instructions   1)  You are not really using the carb ratio right now.  But based on your current insulin requirements I worry that the 1 unit per 8 grams is too much if you do start using it, so we changed the setting to 1 unit per 20 grams, and you should use this if you are eating and keeping food.    2)  I am worried about not testing often enough-- worried that you could get a bad low blood sugar since you don't feel them well-- and also worried about going too long between site changes.  Goal:  -- Change pump site Tuesday and Friday each week  -- 4 BG tests per day.    3)   Agree that the 670g option would be great for you.  The 'closed loop' mode would automatically adjust the basal rate to help avoid hypoglcyemia, particularly important for you with your history.   But it will require you to test -- no more than 12 hours between tests-- and if your CGM alarm is beeping in order to have the closed loop mode work.      4)  It would be a really good idea now to do some tests at 2-3am to see if you are dropping low in the middle of the night.    5)  Make sure to always have your meter with you -- otherwise you can't test if there is a concern about a low or high.    6)  For times that you  "are off the tube feeds for 30-60 minutes, suspend pump.  If you are off for >1 hour, go into the \"Temp basal\" set up and set the duration that you will be off in hours, and the rate at \"25%\" which is 25% of your usual basal rate.      Follow Up:  January 18 at 9:20am        2.  Frequency of blood sugar checks:  Premeal, bedtime, and additional measurements PRN-- Should have strips sufficient to test 8 times per day given her labiltiy history.    3.  Continue routine follow up for autoislet transplant patients:  Mixed meal test (6 mL/kg BoostHP to max of 360 mL) at 3 months, 6 months, and once a year post transplant.  Hemoglobin A1c levels at these time points and quarterly.    4.  Other issues addressed today:  As above        Follow up:  3 months    Contact me for questions at [phone] or [phone].  Emergency number to reach pediatric endocrinology after hours is [phone].        Amena Maher MD  , Pediatric Endocrinology and Diabetes  [facility] [place]  [facility]      VISIT TIME:  30 minutes of face to face time, >50% spent in counseling and coordination of care  "

## 2017-10-21 LAB — PANC ISLET CELL AB TITR SER: NORMAL {TITER}

## 2017-10-24 ENCOUNTER — TELEPHONE (OUTPATIENT)
Dept: TRANSPLANT | Facility: CLINIC | Age: 52
End: 2017-10-24

## 2017-10-24 NOTE — TELEPHONE ENCOUNTER
Called Chantell and asked her if she wished to see Dr Samantha Oconnell as suggested /discyssed at her last appointment with Dr Maher. She said she would and that it was not to far to come--lives 45 minutes away. She declined mu offer to make an appointment and took the scheduling number , saying she would call this morning.

## 2017-10-30 DIAGNOSIS — Z00.00 ROUTINE HEALTH MAINTENANCE: ICD-10-CM

## 2017-10-30 NOTE — TELEPHONE ENCOUNTER
Last office visit 12/20/16. Last refill received 6/28/17. [facility] site verified 10/30/17.    Jahaira Mi RN

## 2017-10-31 RX ORDER — DRONABINOL 2.5 MG/1
5 CAPSULE ORAL 2 TIMES DAILY PRN
Qty: 56 CAPSULE | Refills: 0 | Status: SHIPPED | OUTPATIENT
Start: 2017-10-31 | End: 2018-04-17

## 2017-11-15 RX ORDER — ACETAMINOPHEN 325 MG/1
650 TABLET ORAL
Status: CANCELLED
Start: 2017-11-15

## 2017-11-15 RX ORDER — DIPHENHYDRAMINE HCL 25 MG
25 TABLET ORAL
Status: CANCELLED
Start: 2017-11-15

## 2017-11-24 ENCOUNTER — OFFICE VISIT (OUTPATIENT)
Dept: INTERNAL MEDICINE | Facility: CLINIC | Age: 52
End: 2017-11-24

## 2017-11-24 VITALS
HEART RATE: 70 BPM | DIASTOLIC BLOOD PRESSURE: 68 MMHG | BODY MASS INDEX: 27.53 KG/M2 | SYSTOLIC BLOOD PRESSURE: 99 MMHG | RESPIRATION RATE: 16 BRPM | WEIGHT: 150.5 LBS

## 2017-11-24 DIAGNOSIS — R10.2 ABDOMINAL PAIN, SUPRAPUBIC: Primary | ICD-10-CM

## 2017-11-24 DIAGNOSIS — R10.2 ABDOMINAL PAIN, SUPRAPUBIC: ICD-10-CM

## 2017-11-24 LAB
ALBUMIN UR-MCNC: NEGATIVE MG/DL
ANION GAP SERPL CALCULATED.3IONS-SCNC: 7 MMOL/L (ref 3–14)
APPEARANCE UR: CLEAR
BASOPHILS # BLD AUTO: 0.1 10E9/L (ref 0–0.2)
BASOPHILS NFR BLD AUTO: 0.6 %
BILIRUB UR QL STRIP: NEGATIVE
BUN SERPL-MCNC: 8 MG/DL (ref 7–30)
CALCIUM SERPL-MCNC: 9 MG/DL (ref 8.5–10.1)
CHLORIDE SERPL-SCNC: 108 MMOL/L (ref 94–109)
CO2 SERPL-SCNC: 27 MMOL/L (ref 20–32)
COLOR UR AUTO: ABNORMAL
CREAT SERPL-MCNC: 1.01 MG/DL (ref 0.52–1.04)
DIFFERENTIAL METHOD BLD: ABNORMAL
EOSINOPHIL # BLD AUTO: 0.2 10E9/L (ref 0–0.7)
EOSINOPHIL NFR BLD AUTO: 1.6 %
ERYTHROCYTE [DISTWIDTH] IN BLOOD BY AUTOMATED COUNT: 13.2 % (ref 10–15)
GFR SERPL CREATININE-BSD FRML MDRD: 57 ML/MIN/1.7M2
GLUCOSE SERPL-MCNC: 44 MG/DL (ref 70–99)
GLUCOSE UR STRIP-MCNC: NEGATIVE MG/DL
HCT VFR BLD AUTO: 36.5 % (ref 35–47)
HGB BLD-MCNC: 11.9 G/DL (ref 11.7–15.7)
HGB UR QL STRIP: NEGATIVE
IMM GRANULOCYTES # BLD: 0 10E9/L (ref 0–0.4)
IMM GRANULOCYTES NFR BLD: 0.2 %
KETONES UR STRIP-MCNC: NEGATIVE MG/DL
LEUKOCYTE ESTERASE UR QL STRIP: NEGATIVE
LYMPHOCYTES # BLD AUTO: 5.7 10E9/L (ref 0.8–5.3)
LYMPHOCYTES NFR BLD AUTO: 49.8 %
MCH RBC QN AUTO: 30 PG (ref 26.5–33)
MCHC RBC AUTO-ENTMCNC: 32.6 G/DL (ref 31.5–36.5)
MCV RBC AUTO: 92 FL (ref 78–100)
MONOCYTES # BLD AUTO: 0.8 10E9/L (ref 0–1.3)
MONOCYTES NFR BLD AUTO: 6.9 %
MUCOUS THREADS #/AREA URNS LPF: PRESENT /LPF
NEUTROPHILS # BLD AUTO: 4.7 10E9/L (ref 1.6–8.3)
NEUTROPHILS NFR BLD AUTO: 40.9 %
NITRATE UR QL: NEGATIVE
NRBC # BLD AUTO: 0 10*3/UL
NRBC BLD AUTO-RTO: 0 /100
PH UR STRIP: 7 PH (ref 5–7)
PLATELET # BLD AUTO: 375 10E9/L (ref 150–450)
POTASSIUM SERPL-SCNC: 3.9 MMOL/L (ref 3.4–5.3)
RBC # BLD AUTO: 3.97 10E12/L (ref 3.8–5.2)
RBC #/AREA URNS AUTO: <1 /HPF (ref 0–2)
SODIUM SERPL-SCNC: 143 MMOL/L (ref 133–144)
SOURCE: ABNORMAL
SP GR UR STRIP: 1 (ref 1–1.03)
UROBILINOGEN UR STRIP-MCNC: 0 MG/DL (ref 0–2)
WBC # BLD AUTO: 11.5 10E9/L (ref 4–11)
WBC #/AREA URNS AUTO: 1 /HPF (ref 0–2)

## 2017-11-24 ASSESSMENT — PAIN SCALES - GENERAL: PAINLEVEL: EXTREME PAIN (9)

## 2017-11-24 NOTE — MR AVS SNAPSHOT
After Visit Summary   11/24/2017    Chantell Kidd    MRN: [id number]           Patient Information     Date Of Birth          1965        Visit Information        Provider Department      11/24/2017 7:40 AM Svitlana Mccormick MD [facility]        Today's Diagnoses     Abdominal pain, suprapubic    -  1       Follow-ups after your visit        Your next 10 appointments already scheduled     Nov 24, 2017  9:00 AM CST   LAB with  LAB   [facility] ([facility])    [address] [place] [address]  [address] [facility] [address] [phone]           Please do not eat 10-12 hours before your appointment if you are coming in fasting for labs on lipids, cholesterol, or glucose (sugar). This does not apply to pregnant women. Water, hot tea and black coffee (with nothing added) are okay. Do not drink other fluids, diet soda or chew gum.            Jan 18, 2018  9:20 AM CST   (Arrive by 9:05 AM)   Return Auto [place] with Amena Maher MD   [facility] ([facility])    [address] [place] [id number] [phone]              Future tests that were ordered for you today     Open Future Orders        Priority Expected Expires Ordered    UA with Micro reflex to Culture Routine 11/24/2017 11/24/2018 11/24/2017    CBC with platelets differential Routine 11/24/2017 12/8/2017 11/24/2017    Basic metabolic panel Routine 11/24/2017 12/8/2017 11/24/2017            Who to contact     Please call your clinic at [phone] to:    Ask questions about your health    Make or cancel appointments    Discuss your medicines    Learn about your test results    Speak to your doctor   If you have compliments or concerns about an experience at your clinic, or if you wish to file a complaint, please contact [facility] Patient Relations at [phone] or email us at  [email or web address]         Additional Information About Your Visit        [employer] Information     [employer] gives you secure access to your electronic health record. If you see a primary care provider, you can also send messages to your care team and make appointments. If you have questions, please call your primary care clinic.  If you do not have a primary care provider, please call [phone] and they will assist you.      [employer] is an electronic gateway that provides easy, online access to your medical records. With [employer], you can request a clinic appointment, read your test results, renew a prescription or communicate with your care team.     To access your existing account, please contact your [facility] or call [phone] for assistance.        Care EveryWhere ID     This is your Care EveryWhere ID. This could be used by other organizations to access your [facility] medical records  [id number]        Your Vitals Were     Pulse Respirations BMI (Body Mass Index)             70 16 27.53 kg/m2          Blood Pressure from Last 3 Encounters:   11/24/17 99/68   10/19/17 111/73   08/30/17 111/73    Weight from Last 3 Encounters:   11/24/17 68.3 kg (150 lb 8 oz)   10/19/17 67.9 kg (149 lb 12.8 oz)   08/30/17 63.5 kg (140 lb)              We Performed the Following     Eye Exam - HIM Scan          Today's Medication Changes          These changes are accurate as of: 11/24/17  8:58 AM.  If you have any questions, ask your nurse or doctor.               Stop taking these medicines if you haven't already. Please contact your care team if you have questions.     morphine 0.1% in solosite topical gel   Stopped by:  Svitlana Mccormick MD           oxyCODONE IR 5 MG tablet   Commonly known as:  ROXICODONE   Stopped by:  Svitlana Mccormick MD                    Primary Care Provider Office Phone # Fax #    Ron Morgan [phone]  [facility] [address]  [facility] [address]        Equal Access to Services     BRIDGETTE JOHNSON : Hadii jose maria mims hadjoannebeckie Sogillali, waaxda luqadaha, qaybta kalluviastaci azar, bambi moorerosendoalex flowers. So [facility] [phone].    ATENCIÓN: Si habla español, tiene a webb disposición servicios gratuitos de asistencia lingüística. [facility] [phone].    We comply with applicable federal civil rights laws and [place] laws. We do not discriminate on the basis of race, color, national origin, age, disability, sex, sexual orientation, or gender identity.            Thank you!     Thank you for choosing [facility]  for your care. Our goal is always to provide you with excellent care. Hearing back from our patients is one way we can continue to improve our services. Please take a few minutes to complete the written survey that you may receive in the mail after your visit with us. Thank you!             Your Updated Medication List - Protect others around you: Learn how to safely use, store and throw away your medicines at www.disposemymeds.org.          This list is accurate as of: 11/24/17  8:58 AM.  Always use your most recent med list.                   Brand Name Dispense Instructions for use Diagnosis    acetaminophen 500 MG Caps      Take 1,000 mg by mouth three times a day as needed.        alendronate 70 MG tablet    FOSAMAX    4 tablet    Take 1 tablet (70 mg) by mouth every 7 days On Sundays take first thing in the morning with plain water and remain upright for at least 30 minutes and until after first food of the day  Do not restart Fosamax (alendronate) until your difficulty swallowing has resolved and you have finished the entire course of fluconazole (Diflucan).    Osteoporosis       alum & mag hydroxide-simethicone 200-200-25 MG Chew chewable tablet    GELUSIL    100 tablet    CHEW AND SWALLOW 2 CHEWS EVERY 4 HOURS AS NEEDED FOR INDIGESTION    Abdominal pain        amylase-lipase-protease 30360 UNITS Cpep    CREON 12    2160 capsule    Take 6 capsules with meals and 3 with snacks.  This is up to 24 capsules per day.    Exocrine pancreatic insufficiency       aspirin 81 MG tablet      Take 81 mg by mouth daily.        blood glucose monitoring lancets     102 each    by Lancet route. Use to test blood sugar daily or as directed.    Chronic abdominal pain       * blood glucose monitoring test strip    no brand specified    240 each    Use to test blood glucoses 6-8 times per day.    Post-pancreatectomy diabetes (H)       * blood glucose monitoring test strip    NOEMI CONTOUR NEXT    240 each    Use to test blood sugar 8 times daily.    Post-pancreatectomy diabetes (H)       * BOOST HIGH PROTEIN Liqd      After above baseline labs are drawn, give: 6 mL/kg to maximum of 360 mL; the beverage is to be consumed within 5 minutes.    Post-pancreatectomy diabetes (H), Pancreatic insufficiency, Vitamin D deficiency       * BOOST HIGH PROTEIN Liqd      After above baseline labs are drawn, give: 6 mL/kg to maximum of 360 mL; the beverage is to be consumed within 5 minutes.    Post-pancreatectomy diabetes (H), Pancreatic insufficiency       * BOOST HIGH PROTEIN Liqd      Also can use Ensure clear (available over the counter)    Pancreatic insufficiency       * BOOST HIGH PROTEIN Liqd      After above baseline labs are drawn, give: 6 mL/kg to maximum of 360 mL; the beverage is to be consumed within 5 minutes.    Post-pancreatectomy diabetes (H), Vitamin D deficiency, unspecified       cyclobenzaprine 5 MG tablet    FLEXERIL    42 tablet    Take 1 tablet (5 mg) by mouth 3 times daily as needed for muscle spasms    Abdominal pain, generalized       diclofenac 1 % Gel topical gel    VOLTAREN     2 g Apply 2 g to skin four times a day as needed (to affected upper extremity joint(s)). Maximum 8g/day per joint, 16g/day total.        dicyclomine 10 MG capsule    BENTYL    40 capsule    TAKE ONE  CAPSULE BY MOUTH EVERY 6 HOURS AS NEEDED    Abdominal pain, epigastric       docusate sodium 100 MG capsule    DOK    120 capsule    Take 1 capsule (100 mg) by mouth daily as needed for constipation    AP (abdominal pain)       dronabinol 2.5 MG capsule    MARINOL    56 capsule    Take 2 capsules (5 mg) by mouth 2 times daily as needed    Routine health maintenance       * DULoxetine 30 MG EC capsule    CYMBALTA    90 capsule    Take 1 capsule (30 mg) by mouth daily With 60mg capsule for total dose of 90mg    Major depressive disorder, recurrent episode, moderate (H), CIERRA (generalized anxiety disorder)       * DULoxetine 60 MG EC capsule    CYMBALTA    90 capsule    Take 1 capsule (60 mg) by mouth daily With 30mg capsule for total dose of 90mg    Major depressive disorder, recurrent episode, moderate (H), CIERRA (generalized anxiety disorder)       fluconazole 200 MG tablet    DIFLUCAN    15 tablet    Take 2 tabs the first day and 1 tab for the next 13 days        furosemide 20 MG tablet    LASIX    180 tablet    Take 1 tablet (20 mg) by mouth 2 times daily    Edema, unspecified type       hydrocortisone 10 MG tablet    CORTEF    60 tablet    Take 10 mg in the morning and 10 mg at bedtime. Watch for hypoglycemia recurrence.    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    Chronic abdominal pain       levothyroxine 112 MCG tablet    SYNTHROID/LEVOTHROID    90 tablet    Take 1 tablet (112 mcg) by mouth daily    Hypothyroidism       linaclotide 145 MCG capsule    LINZESS    30 capsule    Take 1 capsule (145 mcg) by mouth every morning (before breakfast)    Constipation, unspecified constipation type, Chronic back pain, unspecified back location,  unspecified back pain laterality, Physical deconditioning, Primary insomnia       metoclopramide 5 MG tablet    REGLAN    100 tablet    Take 2 tablets (10 mg) by mouth 3 times daily (before meals    Routine health maintenance       nystatin 179153 unit/mL Susp suspension    MYCOSTATIN    60 mL    Take 1 mL (100,000 Units) by mouth 4 times daily    Odynophagia       ondansetron 4 MG ODT tab    ZOFRAN-ODT    60 tablet    DISSOLVE ONE TABLET ON THE TONGUE EVERY 6 HOURS AS NEEDED FOR NAUSEA    Nausea       pantoprazole 40 MG EC tablet    PROTONIX    180 tablet    Take 1 tablet (40 mg) by mouth 2 times daily    H. pylori infection       polyethylene glycol Packet    MIRALAX/GLYCOLAX    14 each    Take 1 packet by mouth 2 times daily as needed for constipation    Chronic constipation       potassium chloride SA 20 MEQ CR tablet    K-DUR/KLOR-CON M    90 tablet    Take 1 tablet (20 mEq) by mouth daily    Hypokalemia       pregabalin 150 MG capsule    LYRICA    90 capsule    Take 1 capsule (150 mg) by mouth 3 times daily    Chronic generalized abdominal pain       senna 8.6 MG tablet    SENNA LAX    90 tablet    Take 1-2 tablets by mouth daily as needed for constipation    Other constipation       sodium bicarbonate 650 MG tablet     135 tablet    Take one-half tablet (325 mg), via feeding tube every 4 hours.    Malnutrition (H)       SUMAtriptan 50 MG tablet    IMITREX    30 tablet    Take 1 tablet (50 mg) by mouth at onset of headache for migraine Take 1 Tab by mouth Once as needed for Migraine Headache. May repeat after two hours.  Maximum dose 200 mg/24 hours.    Migraine       topiramate 100 MG tablet    TOPAMAX    180 tablet    Take 1 tablet (100 mg) by mouth 2 times daily    Migraine, unspecified, not intractable, without status migrainosus       traZODone 100 MG tablet    DESYREL    100 tablet    Take 1-2 tablets (100-200 mg) by mouth At Bedtime *AN HOUR BEFORE*    Primary insomnia, Constipation, unspecified  constipation type, Chronic back pain, unspecified back location, unspecified back pain laterality, Physical deconditioning       * Notice:  This list has 8 medication(s) that are the same as other medications prescribed for you. Read the directions carefully, and ask your doctor or other care provider to review them with you.

## 2017-11-24 NOTE — PROGRESS NOTES
Rooming Note    Health Maintenance  Health Maintenance Due   Topic Date Due     MIGRAINE ACTION PLAN  12/27/1983     MAMMO SCREEN Q2 YR (SYSTEM ASSIGNED)  06/03/2016     PHQ-9 Q1 MONTH  11/14/2016     PHQ-9 Q3 MONTHS  01/14/2017     LIPID MONITORING Q1 YEAR  04/14/2017     MICROALBUMIN Q1 YEAR  04/14/2017     DEPRESSION ACTION PLAN Q1 YR  05/05/2017     CIERRA QUESTIONNAIRE 1 YEAR  06/08/2017     FOOT EXAM Q1 YEAR  07/20/2017     INFLUENZA VACCINE (SYSTEM ASSIGNED)  09/01/2017     EYE EXAM Q1 YEAR  10/18/2017   Health maintenance items discussed and ordered/forwarded to PCP  Care Everywhere/RANDOLPH obtained.    LIZA DAVIDSON at 7:59 AM on 11/24/2017.

## 2017-11-24 NOTE — PROGRESS NOTES
CC:  Wants pain medication and GJ tube taken out    S:  Low back, lower abd pain, suprapubic pain and dysuria urinary x 3 weeks  10/30/17 [facility] Notes reviewed and copy/pasted below to consolidate information  Most recent endocrinology, GI and IM notes reviewed    States she was told by a nurse, not clear who (?non-RN , outpatient triage nurse?) that she could come here to have her tube removed.  Has some pain around the tube site, some bleeding, some yellow-green discharge, odor.  Changes dressings 3 times a day.  Prior vomiting, has ondansetron available.  No change in BM's, fevers, chills, hematuria.    RN coordinator for GI is Leslie Cortez.  Has not contacted her yet.  I offered to help coordinate and evaluation and she declined.  Would rather call her on her own.  She has not yet scheduled the CT ordered by Dr. Estrada.  Constipation was addressed.  Advised EGD and colonoscopy.  Impression:          - Normal esophagus.                        - Z-line regular, 37 cm from the incisors.                        - A gastric tube with tip in the jejunum was found in                        the stomach.                        - Widely patent duodenojejunostomy, characterized by                        healthy appearing mucosa and visible sutures was found.                        - Normal examined jejunum.                        - J-extension of the G-tube is coiled in the small bowel                        just beyond the pylorus.                        - No specimens collected.   Recommendation:      - Perform a colonoscopy today.                        - Patient will likely need J-extension uncoiled in near                        future. Discussed with Dr. Estrada (referring physician).                        - Return to referring physician.  Colonoscopy revealed no source of bleeding, skin tags suggested prior hemorroids.                                                                                        Deirdre Harris MD     She last saw her PCP, Dr. Morgan,  in 12/2016.  He addressed her chronic pain.  Advised lifestyle changes, referred her to pain clinic.  Did not think opioids were appropriate.  Advised PT.    Progress Notes  Encounter Date: 10/19/2017  Kevin Alves MD   Transplant      []Hide copied text  []Hover for attribution information  SP TPIAT 2012  Now with right paramedian pain for past 2 weeks.  Thinks it is a hernia.  No change in diet, bowels, fever or chills.  No precipitating event.     Exam:  Tender about 3 cm to r of upper midline incision.  No palpable defect.     I/P  Will obtain CT scan with oral contrast to assess for hernia.  RTC after scan.      Electronically signed by Kevin Alves MD at 10/19/2017 11:59 AM     Recent [facility] ER/Hospitalization notes:  HOSPITAL COURSE:  Patient requesting to discharge. I recommended that she stay so we can monitor BG when insulin home pump is restarted. Discussed options with patient. She is leaning towards signing out AMA. Updated Tiff RN and charge RN.   Toro Perez MD    CHIEF COMPLAINT: Abdominal pain      HPI: This is a 51 year old female with past medical history noted below presenting for evaluation of abdominal pain. She has had worsening abdominal pain over the last 4 days; 10/10 intensity; associated with nausea and vomiting. Nothing has made her abdominal pain better. Her abdominal pain is localized to RUQ and radiates to back. She presented to ED where notable workup included CT AP showed No acute intra-abdominal abnormality. She was given IVF; antiemetics; and narcotics but continues to have pain. SHe was admitted to medicine service for observation and management of abdominal pain.        ASSESSMENT:  This is a 51 year old female presenting with acute on chronic abdominal pain. CT AP showed No acute intra-abdominal abnormality. Etiology of abdominal pain unclear unclear. Severe symptomatic hypoglycemia on  10/31.      1. Acute on chronic abdominal pain  2. Nausea and vomiting; resolving  3. Mild dehydration; resolved  4. Hx of IBS  5. Hx of hypothyroidism  6. Hx of insulin dependent diabetes melllitus; severe symptomatic nocturnal hypoglycemia on 10/31  7. Hx of GERD  8. Hx of Chronic pancreatitis follows with [facility]  9. Hx of pancreatectomy with islet cell translplant   10. Hx of adrenal insuffiency on cortef BID     Plan for 11/1/2017  -dc MIVF  -antiemetics  -pain control  -GI consulted   -diabetic educator  -resume diabetic diet + tube feeds  -continue insulin infusion; once her son brings in supplies for insulin pump can restart insulin pump observe overnight if BG levels stable can dc in AM  -outpatient endocrinology f/u early next week      GI consult [facility]  PLAN/RECOMMENDATIONS:   1. Continue GJ tube feedings, as tolerated  2. Okay to ADAT from GI standpoint   3. Continue 10mg Reglan TID - can transition back to PO once tolerating diet.   4. Can trial promethazine 12.5mg q6h PRN if nausea not improved w/ IV Reglan therapy   5. Continue pancreatic enzymes and empiric PPI therapy   6. Maintain normal blood sugars   7. Minimize use of narcotics and other constipating medications   8. Encourage activity as tolerated  9. Follow-up with [facility] GI upon discharge.           Nothing more to offer from GI standpoint, will sign off. Please page with questions, or if patient develops new/concerning GI symptoms. Thank you -      Patient's care discussed and reviewed with Dr. Escobar, who is in agreement with above assessment and recommendations.         Meghann Arthur PA-C  [facility]  Pager #[phone]       DISCHARGE EXAM:  Vitals:   11/01/17 0330 11/01/17 0735 11/01/17 1120 11/01/17 1541   BP: 106/66 91/54 110/63 104/58   Pulse: 73 68 68 86   Resp: 16 18 18 16   Temp: 98  F (36.7  C) 98.2  F (36.8  C) 98.7  F (37.1  C) 98.4  F (36.9  C)   SpO2: 97% 97% 97% 95%   Weight:    Height:     GENERAL APPEARANCE: no acute distress  HEENT:  ncat eomi mmm  RESPIRATORY: Lungs clear to auscultation bilaterally.  CARDIOVASCULAR: Normal S1, normal S2, regular rhythm, without murmur.  GASTROINTESTINALsoft, nt, nd  MSK: age appropriate muscle mass  SKIN: Intact, warm, dry.   NEUROLOGIC: nonfocal exam  Psych: appropriate affect and judgement  Ext: trace pedal edema    IMAGING:  CT AP  IMPRESSION: No acute intra-abdominal abnormality.       Postsurgical changes consistent with cholecystectomy, splenectomy and pancreatectomy.     Diffusely heterogeneous appearance of the liver.   LABS:  Results for orders placed or performed during the hospital encounter of 10/30/17   CBC / Diff   Result Value Ref Range   WBC 7.2 4.3 - 10.8 K/uL   RBC 3.96 (L) 4.20 - 5.40 M/uL   HEMOGLOBIN 12.2 12.0 - 16.0 gm/dL   HEMATOCRIT 35.4 (L) 36.0 - 48.0 %   MCV 89 80 - 100 fl   MCH 31 27 - 33 pg   MCHC 35 33 - 36 gm/dL   RDW 13.3 11.5 - 14.5 %   PLATELET COUNT 377 150 - 400 K/uL   MPV 10.3 6.5 - 12.0   PMN % 45.3 %   IG% 0.1 <=1.0 %   LYMPH % 46.5 %   MONO % 7.2 %   EOS % 0.6 %   BASO % 0.4 %   PMN ABSOLUTE 3.27 1.80 - 7.80 K/uL   IG ABSOLUTE 0.01 K/uL   LYMPH ABSOLUTE 3.35 1.00 - 4.00 K/uL   MONO ABSOLUTE 0.52 0.00 - 1.00 K/uL   EOS ABSOLUTE 0.04 0.00 - 0.45 K/uL   BASO ABSOLUTE 0.03 0.00 - 0.20 K/uL   Basic Metabolic Profile   Result Value Ref Range   SODIUM 146 (H) 136 - 145 mmol/L   POTASSIUM 3.5 3.5 - 5.1 mmol/L   CHLORIDE 110 (H) 98 - 107 mmol/L   CARBON DIOXIDE 27 21 - 32 mmol/L   BUN (UREA NITRO) 9 7 - 18 mg/dL   CREATININE 0.98 0.55 - 1.02 mg/dL   EST GFR (MDRD) 59 (L) >60 mL/min   EST GFR IF AFRICAN AM >60 >60 mL/min   GLUCOSE 93 70 - 110 mg/dL   CALCIUM, SERUM 9.4 8.5 - 10.1 mg/dL   ANION GAP 9.0 0.0 - 15.0 mmol/L   Liver Profile   Result Value Ref Range   ALT 43 12 - 68 IU/L   ALKALINE P'TASE 117 45 - 117 IU/L   AST (SGOT) 40 (H) 15 - 37 IU/L   PROTEIN TOTAL 7.7 6.4 - 8.2 g/dL   ALBUMIN 3.9 3.4 - 5.0 gm/dL    BILIRUBIN-DIRECT 0.10 <=0.20 mg/dL   BILIRUBIN-TOTAL 0.3 0.2 - 1.0 mg/dL   Lipase   Result Value Ref Range   LIPASE <50 (L) 73 - 350 IU/L   Urinalysis w / Microscopy, if Indicated   Result Value Ref Range   PH URINE 6.5 4.5 - 8.0   SP.GRAVITY, UA <=1.005 (L) 1.015 - 1.025   GLUCOSE, UA Negative Negative mg/dL   KETONE, UA Negative Negative mg/dL   BILIRUBIN, UA Negative Negative   UROBILINOGEN, UA 0.2 0.2 - 1.0 EU/dL   PROTEIN, UA Negative Negative mg/dL   OCCULT BLOOD, UA Negative Negative, Trace, TRACE-LYSED, TRACE-INTACT   WBC ESTERASE, UA Negative Negative, Trace   NITRITE, UA Negative Negative   CBC (HGB,HCT,WBC,RBC,Platelet)   Result Value Ref Range   WBC 11.4 (H) 4.3 - 10.8 K/uL   RBC 3.78 (L) 4.20 - 5.40 M/uL   HEMOGLOBIN 11.3 (L) 12.0 - 16.0 gm/dL   HEMATOCRIT 34.6 (L) 36.0 - 48.0 %   MCV 92 80 - 100 fl   MCH 30 27 - 33 pg   MCHC 33 33 - 36 gm/dL   RDW 13.7 11.5 - 14.5 %   PLATELET COUNT 304 150 - 400 K/uL   MPV 10.4 6.5 - 12.0   Basic Metab Profile   Result Value Ref Range   SODIUM 141 136 - 145 mmol/L   POTASSIUM 3.5 3.5 - 5.1 mmol/L   CHLORIDE 109 (H) 98 - 107 mmol/L   CARBON DIOXIDE 24 21 - 32 mmol/L   BUN (UREA NITRO) 6 (L) 7 - 18 mg/dL   CREATININE 0.85 0.55 - 1.02 mg/dL   EST GFR (MDRD) >60 >60 mL/min   EST GFR IF AFRICAN AM >60 >60 mL/min   GLUCOSE 214 (H) 70 - 110 mg/dL   CALCIUM, SERUM 8.9 8.5 - 10.1 mg/dL   ANION GAP 8.0 0.0 - 15.0 mmol/L   Magnesium   Result Value Ref Range   Magnesium 1.9 1.8 - 2.4 mg/dL   CBC (HGB,HCT,WBC,RBC,Platelet)   Result Value Ref Range   WBC 7.6 4.3 - 10.8 K/uL   RBC 3.42 (L) 4.20 - 5.40 M/uL   HEMOGLOBIN 10.2 (L) 12.0 - 16.0 gm/dL   HEMATOCRIT 31.2 (L) 36.0 - 48.0 %   MCV 91 80 - 100 fl   MCH 30 27 - 33 pg   MCHC 33 33 - 36 gm/dL   RDW 14.0 11.5 - 14.5 %   PLATELET COUNT 327 150 - 400 K/uL   MPV 10.4 6.5 - 12.0   Basic Metab Profile   Result Value Ref Range   SODIUM 142 136 - 145 mmol/L   POTASSIUM 3.8 3.5 - 5.1 mmol/L   CHLORIDE 109 (H) 98 - 107 mmol/L   CARBON  DIOXIDE 27 21 - 32 mmol/L   BUN (UREA NITRO) 8 7 - 18 mg/dL   CREATININE 0.83 0.55 - 1.02 mg/dL   EST GFR (MDRD) >60 >60 mL/min   EST GFR IF AFRICAN AM >60 >60 mL/min   GLUCOSE 125 (H) 70 - 110 mg/dL   CALCIUM, SERUM 9.0 8.5 - 10.1 mg/dL   ANION GAP 6.0 0.0 - 15.0 mmol/L   Magnesium   Result Value Ref Range   Magnesium 1.9 1.8 - 2.4 mg/dL   POCT Glucose Meter   Result Value Ref Range   GLUCOSE WB METER 43 (LL) 60 - 100 mg/dL   POCT Glucose Meter   Result Value Ref Range   GLUCOSE WB METER 109 (H) 60 - 100 mg/dL               Endocrinology Note from 10/19/17     Chantell is a 51 year old with history of chronic pancreatitis who is s/p total pancreatectomy and islet autotransplant, complicated by insulin resistance, and several episodes of severe hypoglycemia with partial islet graft function.     I am extremely concerned by her lack of self care today. Chantell almost seemed surprised when I said I was worried that she was not taking care of her diabetes. She has gone up to 11 days at a time without changing her pump site on her download (should be every 3 days), has checked only 9 BG in the past 4 weeks and 4 of those were yesterday, probably in anticipation of today's appointment. I asked her to check a BG to bolus during this visit because of some issues she was reporting with her pump, and she did not have a meter with her-- says she does not carry this with her. We discussed how dangerous this is-- she is an insulin dependent diabetic who has had at least two episodes of severe hypoglycemia related to insulin overdose in the past and she needs to have her meter with her at all times. She expresses concerns that her  worries about her having hypoglycemia at night but at the same time she almost never checks her BG and has only one reading in the past 4 weeks during the overnight time that she is concerned about, so we have no idea if she is having hypoglycemia at all. Her A1c is surprisingly normal-- this is  indeed concerning for some hypoglycemia that is not being detected and treated.     do believe a 670g pump would be a good option for her, given her severe hypo events in the past. But the caveat is that she will need to be checking BG and managing her diabetes more proactively for this system to work for her.       1. Changes to current diabetes regimen:  Patient Instructions   1) You are not really using the carb ratio right now. But based on your current insulin requirements I worry that the 1 unit per 8 grams is too much if you do start using it, so we changed the setting to 1 unit per 20 grams, and you should use this if you are eating and keeping food.    2) I am worried about not testing often enough-- worried that you could get a bad low blood sugar since you don't feel them well-- and also worried about going too long between site changes. Goal:  -- Change pump site Tuesday and Friday each week  -- 4 BG tests per day.    3) Agree that the 670g option would be great for you. The 'closed loop' mode would automatically adjust the basal rate to help avoid hypoglcyemia, particularly important for you with your history. But it will require you to test -- no more than 12 hours between tests-- and if your CGM alarm is beeping in order to have the closed loop mode work.     4) It would be a really good idea now to do some tests at 2-3am to see if you are dropping low in the middle of the night.    5) Make sure to always have your meter with you -- otherwise you can't test if there is a concern about a low or high.   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     Post-pancreatectomy diabetes (H)     Type 1 diabetes mellitus (H)     Migraine     Abdominal muscle strain     CIERRA (generalized anxiety disorder)     Major depressive disorder, recurrent episode, moderate (H)     CMC DJD(carpometacarpal degenerative joint disease), localized primary     Exocrine pancreatic insufficiency     Hypothyroidism     Hypoglycemia     Mood disorder due to a general medical condition     Decreased oral intake     Odynophagia     Iron deficiency     Anemia, iron deficiency     Adrenal insufficiency (H)     S/P hernia repair     Nausea     Past Surgical History:   Procedure Laterality Date     ARTHROPLASTY CARPOMETACARPAL (THUMB JOINT)  5/2/2014    Procedure: ARTHROPLASTY CARPOMETACARPAL (THUMB JOINT);  Surgeon: Carina Panda MD;  Location: [facility] OR     CHOLECYSTECTOMY  2004     COLONOSCOPY  7/18/2014    Procedure: COLONOSCOPY;  Surgeon: Aurora Sahu MD;  Location: [facility] GI     COLONOSCOPY N/A 8/1/2017    Procedure: COLONOSCOPY;  Colonoscopy and upper endoscopy;  Surgeon: Deirdre Harris MD;  Location: [facility]     ENDOSCOPIC RETROGRADE CHOLANGIOPANCREATOGRAM       ENDOSCOPIC RETROGRADE CHOLANGIOPANCREATOGRAM  4/19/2011    Procedure:ENDOSCOPIC RETROGRADE CHOLANGIOPANCREATOGRAM; Pancreatic Stent Placement       ENDOSCOPIC RETROGRADE CHOLANGIOPANCREATOGRAM  5/26/2011    Procedure:ENDOSCOPIC RETROGRADE CHOLANGIOPANCREATOGRAM; with Pancreatic Stent Removal; Surgeon:DALE MIMS; Location:[facility] OR     ENDOSCOPY UPPER, COLONOSCOPY, COMBINED  4/25/2012    Procedure:COMBINED ENDOSCOPY UPPER, COLONOSCOPY; Enteroscopy with Bile Duct Stent Removal, Colonoscopy  *Latex Safe Room*; Surgeon:GRACY OGDWIN; Location:[facility] OR     ESOPHAGOSCOPY, GASTROSCOPY, DUODENOSCOPY (EGD), COMBINED  5/26/2011    Procedure:COMBINED ESOPHAGOSCOPY, GASTROSCOPY, DUODENOSCOPY (EGD); Surgeon:DALE MIMS; [facility]     ESOPHAGOSCOPY, GASTROSCOPY,  DUODENOSCOPY (EGD), COMBINED N/A 10/30/2014    Procedure: COMBINED ESOPHAGOSCOPY, GASTROSCOPY, DUODENOSCOPY (EGD), BIOPSY SINGLE OR MULTIPLE;  Surgeon: Sarai Moon MD;  [facility]: [facility]     ESOPHAGOSCOPY, GASTROSCOPY, DUODENOSCOPY (EGD), COMBINED Left 7/6/2015    Procedure: COMBINED ESOPHAGOSCOPY, GASTROSCOPY, DUODENOSCOPY (EGD), BIOPSY SINGLE OR MULTIPLE;  Surgeon: Thomas Estrada MD;  [facility]: [facility]     ESOPHAGOSCOPY, GASTROSCOPY, DUODENOSCOPY (EGD), COMBINED N/A 7/8/2016    Procedure: COMBINED ESOPHAGOSCOPY, GASTROSCOPY, DUODENOSCOPY (EGD), BIOPSY SINGLE OR MULTIPLE;  Surgeon: Eloy Klein MD;  [facility]     ESOPHAGOSCOPY, GASTROSCOPY, DUODENOSCOPY (EGD), COMBINED N/A 8/4/2016    Procedure: COMBINED ESOPHAGOSCOPY, GASTROSCOPY, DUODENOSCOPY (EGD), BIOPSY SINGLE OR MULTIPLE;  Surgeon: Jason Brown MD;  Location:  GI     ESOPHAGOSCOPY, GASTROSCOPY, DUODENOSCOPY (EGD), COMBINED N/A 8/1/2017    Procedure: COMBINED ESOPHAGOSCOPY, GASTROSCOPY, DUODENOSCOPY (EGD);;  Surgeon: Deirdre Harris MD;  Location:  GI     GYN SURGERY      Hysterectomy and USO     [facility] ENDOSCOPY W EUS  7/20/2011    Procedure:COMBINED ENDOSCOPIC ULTRASOUND, ESOPHAGOSCOPY, GASTROSCOPY, DUODENOSCOPY (EGD); Surgeon:DARVIN DONOHUE; Location: GI     HERNIORRHAPHY VENTRAL N/A 9/15/2016    Procedure: HERNIORRHAPHY VENTRAL;  Surgeon: Juanita Bernabe MD;  [facility]     HYSTERECTOMY  1997 or 1998    USO     INCISION AND DRAINAGE ABDOMEN WASHOUT, COMBINED  8/16/2012    Procedure: COMBINED INCISION AND DRAINAGE ABDOMEN WASHOUT;  ,debridement and Drainage Post Appendectomy;  Surgeon: Ron Austin MD;  [facility]     INJECT TRANSVERSUS ABDOMINIS PLANE (TAP) BLOCK BILATERAL Bilateral 5/26/2016    Procedure: INJECT TRANSVERSUS ABDOMINIS PLANE (TAP) BLOCK BILATERAL;  Surgeon: Leonard Mccallum MD;  [facility]     LAPAROSCOPIC APPENDECTOMY  7/30/2012     Procedure: LAPAROSCOPIC APPENDECTOMY;  Open Appendectomy;  Surgeon: Ron Austin MD;  [facility]     PANCREATECTOMY, TRANSPLANT AUTO ISLET CELL, COMBINED  1/6/2012    Procedure:COMBINED PANCREATECTOMY, TRANSPLANT AUTO ISLET CELL; Total  Pancreatectomy, Auto Islet Transplant, splenectomy, 18fr. transgastric-jejunal feeding tube placement, liver biopsy; Surgeon:PALAK LEE; Location[facility]     REPLACE GASTROSTOMY TUBE, PERCUTANEOUS N/A 8/30/2017    Procedure: REPLACE GASTROSTOMY TUBE, PERCUTANEOUS;  GJ Tube Change;  Surgeon: Jose Nath PA-C;  [facility]     SPLENECTOMY       Current Outpatient Prescriptions   Medication Sig Dispense Refill     dronabinol (MARINOL) 2.5 MG capsule Take 2 capsules (5 mg) by mouth 2 times daily as needed 56 capsule 0     cyclobenzaprine (FLEXERIL) 5 MG tablet Take 1 tablet (5 mg) by mouth 3 times daily as needed for muscle spasms 42 tablet 2     potassium chloride SA (K-DUR/KLOR-CON M) 20 MEQ CR tablet Take 1 tablet (20 mEq) by mouth daily 90 tablet 0     Nutritional Supplements (BOOST HIGH PROTEIN) LIQD After above baseline labs are drawn, give: 6 mL/kg to maximum of 360 mL; the beverage is to be consumed within 5 minutes.  0     traZODone (DESYREL) 100 MG tablet Take 1-2 tablets (100-200 mg) by mouth At Bedtime *AN HOUR BEFORE* 100 tablet 1     docusate sodium (DOK) 100 MG capsule Take 1 capsule (100 mg) by mouth daily as needed for constipation 120 capsule 0     alum & mag hydroxide-simethicone (GELUSIL) 200-200-25 MG CHEW chewable tablet CHEW AND SWALLOW 2 CHEWS EVERY 4 HOURS AS NEEDED FOR INDIGESTION 100 tablet 1     linaclotide (LINZESS) 145 MCG capsule Take 1 capsule (145 mcg) by mouth every morning (before breakfast) 30 capsule 1     hydrocortisone (CORTEF) 10 MG tablet Take 10 mg in the morning and 10 mg at bedtime. Watch for hypoglycemia recurrence. 60 tablet 3     DULoxetine (CYMBALTA) 30 MG EC capsule Take 1 capsule (30 mg) by mouth  daily With 60mg capsule for total dose of 90mg 90 capsule 1     DULoxetine (CYMBALTA) 60 MG EC capsule Take 1 capsule (60 mg) by mouth daily With 30mg capsule for total dose of 90mg 90 capsule 1     senna (SENNA LAX) 8.6 MG tablet Take 1-2 tablets by mouth daily as needed for constipation 90 tablet 1     fluconazole (DIFLUCAN) 200 MG tablet Take 2 tabs the first day and 1 tab for the next 13 days 15 tablet 0     sodium bicarbonate 650 MG tablet Take one-half tablet (325 mg), via feeding tube every 4 hours. 135 tablet 3     ondansetron (ZOFRAN-ODT) 4 MG ODT tab DISSOLVE ONE TABLET ON THE TONGUE EVERY 6 HOURS AS NEEDED FOR NAUSEA 60 tablet 1     dicyclomine (BENTYL) 10 MG capsule TAKE ONE CAPSULE BY MOUTH EVERY 6 HOURS AS NEEDED 40 capsule 3     blood glucose monitoring (NOEMI CONTOUR NEXT) test strip Use to test blood sugar 8 times daily. 240 each 11     metoclopramide (REGLAN) 5 MG tablet Take 2 tablets (10 mg) by mouth 3 times daily (before meals 100 tablet 6     amylase-lipase-protease (CREON 12) 91098 UNITS CPEP Take 6 capsules with meals and 3 with snacks.  This is up to 24 capsules per day. 2160 capsule 3     furosemide (LASIX) 20 MG tablet Take 1 tablet (20 mg) by mouth 2 times daily 180 tablet 2     pregabalin (LYRICA) 150 MG capsule Take 1 capsule (150 mg) by mouth 3 times daily 90 capsule 5     levothyroxine (SYNTHROID/LEVOTHROID) 112 MCG tablet Take 1 tablet (112 mcg) by mouth daily 90 tablet 3     Nutritional Supplements (BOOST HIGH PROTEIN) LIQD After above baseline labs are drawn, give: 6 mL/kg to maximum of 360 mL; the beverage is to be consumed within 5 minutes.  0     nystatin (MYCOSTATIN) 54790 unit/0.5mL SUSP Take 1 mL (100,000 Units) by mouth 4 times daily 60 mL 1     pantoprazole (PROTONIX) 40 MG enteric coated tablet Take 1 tablet (40 mg) by mouth 2 times daily 180 tablet 3     topiramate (TOPAMAX) 100 MG tablet Take 1 tablet (100 mg) by mouth 2 times daily 180 tablet 1     alendronate (FOSAMAX)  70 MG tablet Take 1 tablet (70 mg) by mouth every 7 days On Sundays take first thing in the morning with plain water and remain upright for at least 30 minutes and until after first food of the day    Do not restart Fosamax (alendronate) until your difficulty swallowing has resolved and you have finished the entire course of fluconazole (Diflucan). 4 tablet 0     blood glucose monitoring (NO BRAND SPECIFIED) test strip Use to test blood glucoses 6-8 times per day. 240 each 11     insulin aspart (NOVOLOG VIAL) 100 UNITS/ML VIAL As directed in pump 36 vial prn     Nutritional Supplements (BOOST HIGH PROTEIN) LIQD After above baseline labs are drawn, give: 6 mL/kg to maximum of 360 mL; the beverage is to be consumed within 5 minutes.  0     SUMAtriptan (IMITREX) 50 MG tablet Take 1 tablet (50 mg) by mouth at onset of headache for migraine Take 1 Tab by mouth Once as needed for Migraine Headache. May repeat after two hours.  Maximum dose 200 mg/24 hours. 30 tablet 1     Nutritional Supplements (BOOST HIGH PROTEIN) LIQD Also can use Ensure clear (available over the counter)  0     acetaminophen 500 MG CAPS Take 1,000 mg by mouth three times a day as needed.       diclofenac (VOLTAREN) 1 % GEL 2 g Apply 2 g to skin four times a day as needed (to affected upper extremity joint(s)). Maximum 8g/day per joint, 16g/day total.       aspirin 81 MG tablet Take 81 mg by mouth daily.       insulin pen needle needle RX# [id number]   Pen Needle UC 31G UF IV Mini   Use 4-8 needles per day for insulin injections.     2 Box 11     polyethylene glycol (MIRALAX/GLYCOLAX) packet Take 1 packet by mouth 2 times daily as needed for constipation  14 each 5     ACCU-CHEK MULTICLIX LANCETS MISC by Lancet route. Use to test blood sugar daily or as directed. 102 each prn     Allergies   Allergen Reactions     Corticosteroids Other (See Comments)     All oral,IV and injectable steroids are contraindicated (unless in life threatening situations) in  Islet Auto transplant recipients. They can cause irreversible loss of islet cell function. Please contact patients transplant care coordinator Malini Julien RN BSN @[phone]/Pager [phone]  and Endocrinologist prior to administration.     Chocolate Flavor Rash     Breaks out when eats chocolate         BP 99/68  Pulse 70  Resp 16  Wt 68.3 kg (150 lb 8 oz)  BMI 27.53 kg/m2  BP Readings from Last 6 Encounters:   11/24/17 99/68   10/19/17 111/73   08/30/17 111/73   08/01/17 111/70   06/10/17 103/64   04/04/17 114/75     Physical Examination:  General:  NAD  Lungs:  Clear to auscultation throughout, no wheezes, rhonchi or rales.  C/V:  Regular rate and rhythm, no murmurs, rubs or gallops.  No JVD, no carotid bruits.  No peripheral edema.   Abdomen:  Not distended.  Bowel sounds active.  Mildly tender deep palpation suprapubic and LLQ areas, no rebound or guarding.  No masses appreciated.\  G/J tube with mild amount of tan d/c, no redness or blood, non tender  Skin:   No rashes or jaundice.  Normal moisture, good skin turgor.  Psych:  Narrow range affect.    Chantell was seen today for hospital f/u.    Diagnoses and all orders for this visit:    Abdominal pain, suprapubic, LLQ  -     UA with Micro reflex to Culture; Future  -     CBC with platelets differential; Future  -     Basic metabolic panel; Future    See GI for further care regarding chronic abdominal pain, GJ tube removal consideration.  I let her know that  is not the place to be requesting pain medication for ongoing abdominal complaints.  I offered to help coordinate her care.  She prefered to do this on her own.    Total time spent 25 minutes.  More than 50% of the time spent with Ms. Kidd on counseling / coordinating her care        Svitlana Mccormick M.D.  Internal Medicine  [facility]   pager [phone]

## 2017-11-24 NOTE — NURSING NOTE
Chief Complaint   Patient presents with     Hospital F/U     patient was seen for back pain.  Patient would also like to discuss feeding tube removal     LIZA DAVIDSON at 7:49 AM on 11/24/2017.

## 2017-11-29 ENCOUNTER — TELEPHONE (OUTPATIENT)
Dept: INTERNAL MEDICINE | Facility: CLINIC | Age: 52
End: 2017-11-29

## 2017-11-29 NOTE — TELEPHONE ENCOUNTER
Pt c/o's of back and abd pain for several weeks. States she sees Dr Morgan all the time-last clinic visit with Dr Morgan was 12/2016. Was seen by Dr Mccormick on 11/24/2017. Requesting pain medication without being seen today. Pt encouraged to be seen in the ER with the severe back and abd pain.  Serena Cannon RN 11:56 AM on 11/29/2017.

## 2017-11-30 ENCOUNTER — TELEPHONE (OUTPATIENT)
Dept: TRANSPLANT | Facility: CLINIC | Age: 52
End: 2017-11-30

## 2017-11-30 DIAGNOSIS — E10.649 HYPOGLYCEMIA UNAWARENESS IN TYPE 1 DIABETES MELLITUS (H): Primary | ICD-10-CM

## 2017-11-30 DIAGNOSIS — E10.641 TYPE 1 DIABETES MELLITUS WITH HYPOGLYCEMIC COMA (H): ICD-10-CM

## 2017-11-30 NOTE — TELEPHONE ENCOUNTER
See email chain below. I called Chantell at 11:20  Dr Garcia request to ascertain what she was doing re her Insulin pump. She stated she took it off  and stopped her tube feeds around dinner time yesterday. She told me she was drinking pop because her BS was 34. I had her recheck and it was 74. She is alone. I asked her to call someone to be with her , failing this , the paramedics. She was able to call her daughter who said she would be there in 10 minutes. This was at 11:30.   I also sent her the following e-mail after verbally giving the instructions.PLan to check in with her later today but I emphised that if this hypoglycemia continues she needs to seek emergency medical help.In my 2 conversations with her she appeared coherent and logical.    Chantell Hilton    As per our discussion, Dr Maher wants you to increase your Cortef  to 25mg 3 times a day for TODAY only.    Since you have already taken your am dose take 25mg at noon  and then 25mg  at your usual evening time.    Go back to 10mg twice a day tomorrow.    GLAD your daughter is coming to be with you.  Please stay in touch with Dr Maher .    I am thinking through what the next step re your abdominal pain should be .        Chantell Hilton.    First-  I assume your tube feeds are still running and are going at the same rate with the same formula as last time I saw you?   If they are not, then  that would affect your insulin needs.  If they are, I don't know why your body is acting a little differently now, but it certainly is.   It is good that you let me know.  I would also recommend that you can be the 'owner' of your own diabetes and make reductions in your insulin even before you talk to me.      Second-  I don't have your current pump settings in this email, but based on the last clinic note you were running on 0.80 unit/hour basal.  Is that the case still?  If yes,  let's go down to 0.50 unit per hour.      These recommendations are assuming your tube feeds are  infusing.  If they are off for some reason, then I want you to follow the plan that we wrote out at the last clinic visit to go down to a 25% rate on the temp basal, or just reset the pump to 25% of the previous rate which would be 0.20 unit per hour (if being off feeds is a long term thing).      yes, we can send the glucagon pens but the glucagon won't work as well when a second (or third) dose is used within a day or two, because it releases the sugar in your liver and once you use it, it takes time to reaccumulate the sugar in the liver.  So you will want to keep checking frequently like you are, especially when you are sleeping overnight tonight, so that we avoid getting into the setting where you even need it.            Amena Maher MD  [facility] [place]   Pediatric Endocrinology  Director of Research, [facility] [employer] [facility]  Phone:  [phone]  Fax:  [phone]  [email or web address]    On Thu, Nov 30, 2017 at 9:50 AM, Chantell Kidd <[email or web address]> wrote:  DR. Maher,     Last night I has a severe low and my  & son need to use the Glucagon pen on me. I was having trouble breathing and my sugar won t read for them. On Wednesday night my  knew I was having problems also. For some reason I m having troubles.   Can you please send in a refill for 2 pens to [facility] as now I m out.   9:28am 11/30 54  9:11am 11/30 38  6:25am 11/30 105  4:03am 11/30 72        7:07 pm 11/29 125  3:46pm 11/29 175  1:22pm 11/29 68  7:13am 11/29 136  6:25 am 11/29 48  12:42am 11/29 45        From: Amena Maher MD [[email or web address]   Sent: Wednesday, November 29, 2017 12:01 PM  To: Chantell Kidd  Cc: Leslie Cortez  Subject: Re: Chantell Ambrocio.     Thanks for letting us know.  I would love to help with that.  Can you send me a few days of your BG (date and time) and insulin boluses (date and time) from the history in your pump, and  also what it reads for your current basal and bolus wizard settings?         Amena Maher MD  [facility] [place]   Pediatric Endocrinology  Director of Research, [facility] [employer] [facility]  Phone:  [phone]  Fax:  [phone]  [email or web address]     On Wed, Nov 29, 2017 at 11:31 AM, Chantell Kidd <[email or web address]> wrote:  Good Morning!     I just wanted you to know that I have been having some lows (34-45). I ve also had really bad back and stomach pain.

## 2017-12-01 ENCOUNTER — TELEPHONE (OUTPATIENT)
Dept: TRANSPLANT | Facility: CLINIC | Age: 52
End: 2017-12-01

## 2017-12-01 NOTE — TELEPHONE ENCOUNTER
Talked to Karyn Mckee -[phone]    Chantell admitted overnight for recurrent hypoglycemia.  Continues to have hypoglycemia-- off pump and they are watching for any exogenous insulin.    Has been on contniuous TF + D10% at 150 mL/hour and has required 8 doses of D50%.    Discussed  Repeat insulin and Cpeptide level during hypoglycemia if possible  Change steroid to IV and dose at 25 mg cortef IV q6h.  If she stabalizes, team asked if they could keep her on stress dose cortef on discharge and have us reduce as outpatient and I agreed this is OK with me.

## 2017-12-12 ENCOUNTER — TELEPHONE (OUTPATIENT)
Dept: TRANSPLANT | Facility: CLINIC | Age: 52
End: 2017-12-12

## 2017-12-12 NOTE — TELEPHONE ENCOUNTER
Called Chantell  And reminded her to bring ALL her pump supplies/Insulin/meter etc. She agreed and verbalized her appointment time with out prompting.

## 2017-12-13 ENCOUNTER — OFFICE VISIT (OUTPATIENT)
Dept: TRANSPLANT | Facility: CLINIC | Age: 52
End: 2017-12-13
Attending: PEDIATRICS
Payer: MEDICARE

## 2017-12-13 VITALS
TEMPERATURE: 98.2 F | WEIGHT: 148.6 LBS | RESPIRATION RATE: 16 BRPM | OXYGEN SATURATION: 98 % | BODY MASS INDEX: 27.34 KG/M2 | DIASTOLIC BLOOD PRESSURE: 75 MMHG | HEART RATE: 76 BPM | HEIGHT: 62 IN | SYSTOLIC BLOOD PRESSURE: 112 MMHG

## 2017-12-13 DIAGNOSIS — Z90.410 POST-PANCREATECTOMY DIABETES (H): Primary | ICD-10-CM

## 2017-12-13 DIAGNOSIS — E89.1 POST-PANCREATECTOMY DIABETES (H): Primary | ICD-10-CM

## 2017-12-13 DIAGNOSIS — E13.9 POST-PANCREATECTOMY DIABETES (H): Primary | ICD-10-CM

## 2017-12-13 LAB — HBA1C MFR BLD: 6.7 % (ref 0–5.7)

## 2017-12-13 PROCEDURE — 99212 OFFICE O/P EST SF 10 MIN: CPT | Mod: 25,ZF

## 2017-12-13 PROCEDURE — 83036 HEMOGLOBIN GLYCOSYLATED A1C: CPT | Mod: ZF | Performed by: PEDIATRICS

## 2017-12-13 ASSESSMENT — PAIN SCALES - GENERAL: PAINLEVEL: MILD PAIN (2)

## 2017-12-13 NOTE — LETTER
12/13/2017       RE: Chantell Kidd  [address]  [facility] [address]     Dear Colleague,    Thank you for referring your patient, Chantell Kidd, to the [facility] SOLID ORGAN TRANSPLANT at [facility]. Please see a copy of my visit note below.    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     Post-pancreatectomy diabetes (H)     Type 1 diabetes mellitus (H)     Migraine     Abdominal muscle strain     CIERRA (generalized anxiety disorder)     Major depressive disorder, recurrent episode, moderate (H)     CMC DJD(carpometacarpal degenerative joint disease), localized primary     Exocrine pancreatic insufficiency     Hypothyroidism     Hypoglycemia     Mood disorder due to a general medical condition     Decreased oral intake     Odynophagia     Iron deficiency     Anemia, iron deficiency     Adrenal insufficiency (H)     S/P hernia repair     Nausea       HPI:  Chantell is a 51 year old female here for follow up of total pancreatectomy and islet autotransplant performed on January 6, 2012.  At the time of the procedure, the patient received 420,000 IEQ, or 5,438 IEQ/kg body weight.  She did not have diabetes before the procedure.  Early post-operative course was complicated by RLQ with appendicitis, eventually required appendectomy.    Despite the high islet mass transplanted, Chantell's post-operative course was initially remarkable for high insulin needs.  She in fact does have islet function, as previously documented by mixed meal tests, but she was insulin resistant, requiring high doses of insulin when on MDI therapy.  She also had frequent day to day  variability, and fluctuating patterns with illness and healthier times, as well as a complex regimen, all of which make her an excellent candidate for an insulin pump which she started in Feb 2014.  Extremely concerning was an episode of severe hypoglycemia in November 2013 at which time she was found unconscious.  Suspect at that time she was on too much basal insulin and because she was ill and not eating at the time, dropped far too low overnight.   In early 2014, she was started on an insulin pump with CGM.  Remarkably, her insulin needs have dropped dramatically on an insulin pump.  She was then relatively stable until 2016.  She was again admitted to the hospital on March 15 and March 29, 2016 for hypoglycemia, that appears to be secondary to both exogenous insulin and possible central adrenal insufficiency.   At that time she had the following lab findings:  On 3/29/16, elevated insulin with low C-peptide during BG 42 mg/dL around 5pm, and then again same pattern with BG 50s at 10pm.  Chantell is pretty clear that she did not take insuiln that day on 3/29/16. She also had three cortisol levels-- including one during hypoglycemia, one at around 4am (possibly also during hypoglycemia) and one 8am draw that were all low-- all 0.6- 1.6 range.    Of note, she did have a kenalog injection one week earlier on 3/24/16, just a few days prior to that draw and was also receiving narcotics in hospital (but not at home).  She has since had another severe hypogylcemic episode in Jan 2017 -- did not lose consciousness but was disoriented and unable to get help for herself at the store.    Picture is also complicated by non-diabetes history which includes the following :  - 7/6/2016 EGD identified diffuse candidiasis through entire esophagous.  Pt has also had recurrent oral thrush in 2016  - Recurrent chronic abdominal pain  - Recurrent vomiting of unclear etiology but G-T placed 10/2016 and converted to GJ 11/2016 with  symptomatic improvement  Unclear if she has any gastroparesis.  Suboptimal study in March 2016 showed 100% retention at 1 hour and then rapid emptying.    - Hernia repair performed by general surgery 9/15/16 (TPIAT team not involved).    - Chronic abdominal pain      New history today:  1)  Diabetes:    Chantell was admitted to the [facility] last week with severe hypoglycemia.  She reports that she had removed her pump and stopped tube feeds on 11/29/17.  She woke up in the AM with gel on her face and her  and son told her that she was breathing funny, not waking up, was hypoglycemic and they gave her gel and glucagon x 2.  Despite those events her  and son went to work and left her at home, though Chantell reports her  was calling to check in.  She went to the ED at  on 11/30 for persistent recurrent hypoglycemia and was ultimately admitted.  From 11/30 to 12/1 she was on tube feeds + D10 at 140 mL/hour + received 8 amps of D50% for low treatment.  She had two insulin/ C-peptide levels drawn, not hypogylcemic at time of draw but first on 11/30 returned as C-peptide low at 0.5 and insulin level elevated at 91 and on 12/1 C-peptide <0.1 and insulin .64.7.   These lab results and clinical course are definitive for exogenous insulin overdose.  Chantell denies giving any extra insulin, denies that anyone else would have given her insuiln, denies pump malfunction, denies thoughts of self harm.   Since discharge from  hospital her hypoglycemia is completely resolved.  She has remained off her insulin pump, restarted tube feeds, and is running -300 range.     Current insuiln:  Off currently    Feeds:  Isosource 1.5 at 40 mL/hour  Wt Readings from Last 4 Encounters:   12/13/17 67.4 kg (148 lb 9.6 oz)   11/24/17 68.3 kg (150 lb 8 oz)   10/19/17 67.9 kg (149 lb 12.8 oz)   08/30/17 63.5 kg (140 lb)     Body mass index is 27.18 kg/(m^2).      Recent hemoglobin A1c levels:  Lab Results   Component  Value Date    A1C 6.4 10/19/2017    A1C 5.9 01/19/2017    A1C 6.8 11/16/2016    A1C 6.7 09/15/2016    A1C 9.1 08/03/2016           2)  Adrenal insufficiency:  Still unclear if this was transient or true central AI but we have been treating her regardless due to SHE history.  Since our last visit, we have maintained her on 20 mg/day (10 BID) of cortef, with Body surface area is 1.72 meters squared. For daily dose of : 12 mg/m2/day.   This was temporarily increased to stress dose for hypoglycemia during hospital admit    3)  Other new concerns include:   - she would like to work towards getting off tube feeds.  Unfortunately it seems there is no point person coordinating her tube feeds.  She has not seen a dietitian  - is getting established with psychology        Review of systems:  Review Of Systems  Skin: negative  Eyes: negative  Ears/Nose/Throat: negative  Respiratory: No shortness of breath, dyspnea on exertion, cough, or hemoptysis  Cardiovascular: negative  Gastrointestinal: chronic abdominal pain + hernia  Genitourinary: negative  Musculoskeletal: negative  Neurologic: negative  Psychiatric: negative  Hematologic/Lymphatic/Immunologic: negative  Endocrine: as above    Past Medical and Surgical History:  Past Medical History:   Diagnosis Date     Chronic abdominal pain      Chronic pancreatitis (H)     S/P pancreatectomy     Depression with anxiety      Diabetes mellitus (H) 1/2012     Gastro-oesophageal reflux disease      Hypothyroidism 4/23/2015     Kidney stones      Low serum cortisol level (H)      Migraines      Other chronic pain     STOMACH     Other chronic pain     LUMBAR SPINE     Spasm of sphincter of Oddi      Past Surgical History:   Procedure Laterality Date     ARTHROPLASTY CARPOMETACARPAL (THUMB JOINT)  5/2/2014    Procedure: ARTHROPLASTY CARPOMETACARPAL (THUMB JOINT);  Surgeon: Carina Panda MD;  Location: [facility]     CHOLECYSTECTOMY  2004     COLONOSCOPY  7/18/2014    Procedure:  COLONOSCOPY;  Surgeon: Aurora Sahu MD;  [facility]: [facility]     COLONOSCOPY N/A 8/1/2017    Procedure: COLONOSCOPY;  Colonoscopy and upper endoscopy;  Surgeon: Deirdre Harris MD;  [facility]     ENDOSCOPIC RETROGRADE CHOLANGIOPANCREATOGRAM       ENDOSCOPIC RETROGRADE CHOLANGIOPANCREATOGRAM  4/19/2011    Procedure:ENDOSCOPIC RETROGRADE CHOLANGIOPANCREATOGRAM; Pancreatic Stent Placement       ENDOSCOPIC RETROGRADE CHOLANGIOPANCREATOGRAM  5/26/2011    Procedure:ENDOSCOPIC RETROGRADE CHOLANGIOPANCREATOGRAM; with Pancreatic Stent Removal; Surgeon:DALE MIMS; Location:[facility]     ENDOSCOPY UPPER, COLONOSCOPY, COMBINED  4/25/2012    Procedure:COMBINED ENDOSCOPY UPPER, COLONOSCOPY; Enteroscopy with Bile Duct Stent Removal, Colonoscopy  *Latex Safe Room*; Surgeon:GRACY GODWINIQ; [facility]     ESOPHAGOSCOPY, GASTROSCOPY, DUODENOSCOPY (EGD), COMBINED  5/26/2011    Procedure:COMBINED ESOPHAGOSCOPY, GASTROSCOPY, DUODENOSCOPY (EGD); Surgeon:DALE MIMS; Location: OR     ESOPHAGOSCOPY, GASTROSCOPY, DUODENOSCOPY (EGD), COMBINED N/A 10/30/2014    Procedure: COMBINED ESOPHAGOSCOPY, GASTROSCOPY, DUODENOSCOPY (EGD), BIOPSY SINGLE OR MULTIPLE;  Surgeon: Sarai Moon MD;  Location:  GI     ESOPHAGOSCOPY, GASTROSCOPY, DUODENOSCOPY (EGD), COMBINED Left 7/6/2015    Procedure: COMBINED ESOPHAGOSCOPY, GASTROSCOPY, DUODENOSCOPY (EGD), BIOPSY SINGLE OR MULTIPLE;  Surgeon: Thomas Estrada MD;  [facility]     ESOPHAGOSCOPY, GASTROSCOPY, DUODENOSCOPY (EGD), COMBINED N/A 7/8/2016    Procedure: COMBINED ESOPHAGOSCOPY, GASTROSCOPY, DUODENOSCOPY (EGD), BIOPSY SINGLE OR MULTIPLE;  Surgeon: Eloy Klein MD;  Location:  GI     ESOPHAGOSCOPY, GASTROSCOPY, DUODENOSCOPY (EGD), COMBINED N/A 8/4/2016    Procedure: COMBINED ESOPHAGOSCOPY, GASTROSCOPY, DUODENOSCOPY (EGD), BIOPSY SINGLE OR MULTIPLE;  Surgeon: Jason Brown MD;  [facility]      ESOPHAGOSCOPY, GASTROSCOPY, DUODENOSCOPY (EGD), COMBINED N/A 8/1/2017    Procedure: COMBINED ESOPHAGOSCOPY, GASTROSCOPY, DUODENOSCOPY (EGD);;  Surgeon: Deirdre Harris MD;  [facility]     GYN SURGERY      Hysterectomy and USO     [facility] ENDOSCOPY W EUS  7/20/2011    Procedure:COMBINED ENDOSCOPIC ULTRASOUND, ESOPHAGOSCOPY, GASTROSCOPY, DUODENOSCOPY (EGD); Surgeon:DARVIN DONOHUE; [facility]     HERNIORRHAPHY VENTRAL N/A 9/15/2016    Procedure: HERNIORRHAPHY VENTRAL;  Surgeon: Juanita Bernabe MD;  [facility]     HYSTERECTOMY  1997 or 1998    USO     INCISION AND DRAINAGE ABDOMEN WASHOUT, COMBINED  8/16/2012    Procedure: COMBINED INCISION AND DRAINAGE ABDOMEN WASHOUT;  ,debridement and Drainage Post Appendectomy;  Surgeon: Ron Austin MD;  [facility]     INJECT TRANSVERSUS ABDOMINIS PLANE (TAP) BLOCK BILATERAL Bilateral 5/26/2016    Procedure: INJECT TRANSVERSUS ABDOMINIS PLANE (TAP) BLOCK BILATERAL;  Surgeon: Leonard Mccallum MD;  [facility]     LAPAROSCOPIC APPENDECTOMY  7/30/2012    Procedure: LAPAROSCOPIC APPENDECTOMY;  Open Appendectomy;  Surgeon: Ron Austin MD;  [facility]     PANCREATECTOMY, TRANSPLANT AUTO ISLET CELL, COMBINED  1/6/2012    Procedure:COMBINED PANCREATECTOMY, TRANSPLANT AUTO ISLET CELL; Total  Pancreatectomy, Auto Islet Transplant, splenectomy, 18fr. transgastric-jejunal feeding tube placement, liver biopsy; Surgeon:PALAK LEE; [facility]     REPLACE GASTROSTOMY TUBE, PERCUTANEOUS N/A 8/30/2017    Procedure: REPLACE GASTROSTOMY TUBE, PERCUTANEOUS;  GJ Tube Change;  Surgeon: Jose Nath PA-C;  [facility]     SPLENECTOMY         Family History:  New changes since last visit:  none  Family History   Problem Relation Age of Onset     Hypertension Mother      DIABETES Mother      OSTEOPOROSIS Mother      CANCER Father      pancreatic cancer     DIABETES Maternal Grandmother      Cardiovascular  "Maternal Grandmother      CANCER Maternal Grandfather      lung cancer     CANCER Sister      brain     CANCER Sister      liver cancer       Social History:  Social History     Social History Narrative     Unchanged. Lives in [place] with her .  Has 5 grandchildren       Physical Exam:  Vitals: /75  Pulse 76  Temp 98.2  F (36.8  C) (Oral)  Resp 16  Ht 1.575 m (5' 2\")  Wt 67.4 kg (148 lb 9.6 oz)  SpO2 98%  BMI 27.18 kg/m2  BMI= Body mass index is 27.18 kg/(m^2).     General:  Well-appearing, NAD  Head: NC/AT  Eyes: sclera white  Abdomen is tender  Neuro:  Normal mental status, normal gross motor  Psych:  Communicative, with normal affect     Results:  Mixed Meal Test:  C Peptide   Date Value Ref Range Status   10/19/2017 2.4 0.9 - 6.9 ng/mL Final   01/19/2017 1.9 0.9 - 6.9 ng/mL Final   01/19/2017 2.2 0.9 - 6.9 ng/mL Final   01/19/2017 1.5 0.9 - 6.9 ng/mL Final   04/07/2016 2.3 0.9 - 6.9 ng/mL Final     Glucose   Date Value Ref Range Status   11/24/2017 44 (LL) 70 - 99 mg/dL Final     Comment:     Critical Value called to and read back by  CLIFFORD DAWKINS MD 11/24/2017 @0958 BY      10/19/2017 202 (H) 70 - 99 mg/dL Final   08/01/2017 120 (H) 70 - 99 mg/dL Final   01/19/2017 140 (H) 70 - 99 mg/dL Final   01/19/2017 153 (H) 70 - 99 mg/dL Final       Hemoglobin A1c  Lab Results   Component Value Date    A1C 6.4 10/19/2017    A1C 5.9 01/19/2017    A1C 6.8 11/16/2016    A1C 6.7 09/15/2016    A1C 9.1 08/03/2016         Liver enzymes  AST       17   1/19/2017  ALT       20   1/19/2017  BILICONJ      0.0   5/20/2014   BILITOTAL      0.2   1/19/2017  ALBUMIN      3.4   1/19/2017  PROTTOTAL      6.6   1/19/2017   ALKPHOS       64   1/19/2017    Creatinine:  Creatinine   Date Value Ref Range Status   11/24/2017 1.01 0.52 - 1.04 mg/dL Final   ]    Complete Blood Count:  CBC RESULTS:   Recent Labs   Lab Test  01/19/17   0739   WBC  5.2   RBC  3.34*   HGB  10.0*   HCT  30.6*   MCV  92   MCH  29.9   MCHC  " 32.7   RDW  17.0*   PLT  531*       Cholesterol  CHOL      236   4/14/2016  HDL      100   4/14/2016  LDL      120   4/14/2016  TRIG       80   4/14/2016  CHOLHDLRATIO      3.2   1/8/2015        Assessment:  1. Post-pancreatectomy diabetes mellitus - partial islet graft function but highly variable insulin needs depending on health status  2.  S/p islet transplant with partial function (autologous)   3.  Possible central adrenal insufficiency due to low cortisol during hypoglycemia (also suspect excess exogenous insulin exposure at that time), has not yet been retested, remains on treatment  4.  Hypoglycemia unawareness        Chantell is a 51 year old with history of chronic pancreatitis who is s/p total pancreatectomy and islet autotransplant, complicated by insulin resistance, and several episodes of severe hypoglycemia with partial islet graft function.       I am very concerned about recent severe hypoglycemia requiring hospital admission.  This was due to exogenous insulin.  Chantell denies any intentional overdose of insulin and denies self-harm.  She is now with stable BG.  We will restart pump with very low settings and have her follow up with #s.    She also would like to get off tube feeds.  I can't figure out who is directing the tube feed plan-  Suggest we have her get in with a dietitian to assist with assessing oral intake and calorie needs.          Plan:  1.  Changes to current diabetes regimen:  Patient Instructions   1)  I am very happy to see that your hypoglycemia has completely resolved!  That makes me feel safe to restart insulin.   We know from the labwork that you had high amounts of exogenous insulin (injected insulin) but that your body was not making any of that insulin (C-peptide was low, which is the marker for the insulin our own body's make).   We are not sure why that happened because we can't identify any incident of getting extra insulin dose.    2)  Since your #s are now 200-300 range, we  "will restart insulin.  But we are restarting at a much lower dose before because of that hypoglycemia event.   Start insulin pump at:  Basal rate:     Standard = on tube feeds, run at 0.35 unit per hour (down from 0.80)     Pattern A = when off tube feeds, run at 0.10 unit per hour  Bolus:     New correction factor (ISF) of 125 mg/dL, which means you get 1 unit for each 125 points in BG above target     New carb ratio of 40g    3)  How to change between the \"STANDARD\" and \"PATTERN A\":  -- go to basal menu and 'select patterns'.   -- press act on the standard or pattern A, depending which one you need  -- When pattern A is on (the OFF tube feed pattern), the screen will have a [place] up top to indicate a pattern basal.    4)  Let me know in a few days how this is going.    5)  Also get re-established with pain, and with psychology.    Follow up:  Thursday 3/1 at 9:20am.      2.  Frequency of blood sugar checks:  Premeal, bedtime, and additional measurements PRN-- Should have strips sufficient to test 8 times per day given her labiltiy history.    3.  Continue routine follow up for autoislet transplant patients:  Mixed meal test (6 mL/kg BoostHP to max of 360 mL) at 3 months, 6 months, and once a year post transplant.  Hemoglobin A1c levels at these time points and quarterly.    4.  Other issues addressed today:  As above        Follow up:  3 months    Contact me for questions at [phone] or [phone].  Emergency number to reach pediatric endocrinology after hours is [phone].        Amena Maher MD  , Pediatric Endocrinology and Diabetes  [facility] [place]  [facility]      VISIT TIME:  30 minutes of face to face time, >50% spent in counseling and coordination of care    Again, thank you for allowing me to participate in the care of your patient.      Sincerely,    Amena Maher MD      "

## 2017-12-13 NOTE — MR AVS SNAPSHOT
"              After Visit Summary   12/13/2017    Chantell Kidd    MRN: [id number]           Patient Information     Date Of Birth          1965        Visit Information        Provider Department      12/13/2017 8:20 AM Amena Maher MD [facility]        Today's Diagnoses     Post-pancreatectomy diabetes (H)    -  1      Care Instructions    1)  I am very happy to see that your hypoglycemia has completely resolved!  That makes me feel safe to restart insulin.   We know from the labwork that you had high amounts of exogenous insulin (injected insulin) but that your body was not making any of that insulin (C-peptide was low, which is the marker for the insulin our own body's make).   We are not sure why that happened because we can't identify any incident of getting extra insulin dose.    2)  Since your #s are now 200-300 range, we will restart insulin.  But we are restarting at a much lower dose before because of that hypoglycemia event.   Start insulin pump at:  Basal rate:     Standard = on tube feeds, run at 0.35 unit per hour (down from 0.80)     Pattern A = when off tube feeds, run at 0.10 unit per hour  Bolus:     New correction factor (ISF) of 125 mg/dL, which means you get 1 unit for each 125 points in BG above target     New carb ratio of 40g    3)  How to change between the \"STANDARD\" and \"PATTERN A\":  -- go to basal menu and 'select patterns'.   -- press act on the standard or pattern A, depending which one you need  -- When pattern A is on (the OFF tube feed pattern), the screen will have a [place] up top to indicate a pattern basal.    4)  Let me know in a few days how this is going.    5)  Also get re-established with pain, and with psychology.            Follow-ups after your visit        Who to contact     If you have questions or need follow up information about today's clinic visit or your schedule please contact [facility] directly at " "[phone].  Normal or non-critical lab and imaging results will be communicated to you by MyChart, letter or phone within 4 business days after the clinic has received the results. If you do not hear from us within 7 days, please contact the clinic through pbsihart or phone. If you have a critical or abnormal lab result, we will notify you by phone as soon as possible.  Submit refill requests through Hellot[employer] or call your pharmacy and they will forward the refill request to us. Please allow 3 business days for your refill to be completed.          Additional Information About Your Visit        pbsihart Information     Hellotravel gives you secure access to your electronic health record. If you see a primary care provider, you can also send messages to your care team and make appointments. If you have questions, please call your primary care clinic.  If you do not have a primary care provider, please call [phone] and they will assist you.        Care EveryWhere ID     This is your Care EveryWhere ID. This could be used by other organizations to access your [facility] records  [id number]        Your Vitals Were     Pulse Temperature Respirations Height Pulse Oximetry BMI (Body Mass Index)    76 98.2  F (36.8  C) (Oral) 16 1.575 m (5' 2\") 98% 27.18 kg/m2       Blood Pressure from Last 3 Encounters:   12/13/17 112/75   11/24/17 99/68   10/19/17 111/73    Weight from Last 3 Encounters:   12/13/17 67.4 kg (148 lb 9.6 oz)   11/24/17 68.3 kg (150 lb 8 oz)   10/19/17 67.9 kg (149 lb 12.8 oz)              Today, you had the following     No orders found for display       Primary Care Provider Office Phone # Fax #    Ron Morgan [phone]       [address] [place] [id number]        Equal Access to Services     BRIDGETTE JOHNSON AH: Arslan Shoemaker, waaxda luqadaha, qaybta kaalmada nissa, bambi flowers. So wa " [phone].    ATENCIÓN: Si marcelo bruner, tiene a webb disposición servicios gratuitos de asistencia lingüística. Karyn cabrera [phone].    We comply with applicable federal civil rights laws and [place] laws. We do not discriminate on the basis of race, color, national origin, age, disability, sex, sexual orientation, or gender identity.            Thank you!     Thank you for choosing [facility]  for your care. Our goal is always to provide you with excellent care. Hearing back from our patients is one way we can continue to improve our services. Please take a few minutes to complete the written survey that you may receive in the mail after your visit with us. Thank you!             Your Updated Medication List - Protect others around you: Learn how to safely use, store and throw away your medicines at www.disposemymeds.org.          This list is accurate as of: 12/13/17  9:31 AM.  Always use your most recent med list.                   Brand Name Dispense Instructions for use Diagnosis    acetaminophen 500 MG Caps      Take 1,000 mg by mouth three times a day as needed.        alendronate 70 MG tablet    FOSAMAX    4 tablet    Take 1 tablet (70 mg) by mouth every 7 days On Sundays take first thing in the morning with plain water and remain upright for at least 30 minutes and until after first food of the day  Do not restart Fosamax (alendronate) until your difficulty swallowing has resolved and you have finished the entire course of fluconazole (Diflucan).    Osteoporosis       alum & mag hydroxide-simethicone 200-200-25 MG Chew chewable tablet    GELUSIL    100 tablet    CHEW AND SWALLOW 2 CHEWS EVERY 4 HOURS AS NEEDED FOR INDIGESTION    Abdominal pain       amylase-lipase-protease 66003 UNITS Cpep    CREON 12    2160 capsule    Take 6 capsules with meals and 3 with snacks.  This is up to 24 capsules per day.    Exocrine pancreatic insufficiency       aspirin 81 MG tablet      Take 81 mg  by mouth daily.        blood glucose monitoring lancets     102 each    by Lancet route. Use to test blood sugar daily or as directed.    Chronic abdominal pain       * blood glucose monitoring test strip    no brand specified    240 each    Use to test blood glucoses 6-8 times per day.    Post-pancreatectomy diabetes (H)       * blood glucose monitoring test strip    NOEMI CONTOUR NEXT    240 each    Use to test blood sugar 8 times daily.    Post-pancreatectomy diabetes (H)       * BOOST HIGH PROTEIN Liqd      After above baseline labs are drawn, give: 6 mL/kg to maximum of 360 mL; the beverage is to be consumed within 5 minutes.    Post-pancreatectomy diabetes (H), Pancreatic insufficiency, Vitamin D deficiency       * BOOST HIGH PROTEIN Liqd      After above baseline labs are drawn, give: 6 mL/kg to maximum of 360 mL; the beverage is to be consumed within 5 minutes.    Post-pancreatectomy diabetes (H), Pancreatic insufficiency       * BOOST HIGH PROTEIN Liqd      Also can use Ensure clear (available over the counter)    Pancreatic insufficiency       * BOOST HIGH PROTEIN Liqd      After above baseline labs are drawn, give: 6 mL/kg to maximum of 360 mL; the beverage is to be consumed within 5 minutes.    Post-pancreatectomy diabetes (H), Vitamin D deficiency, unspecified       cyclobenzaprine 5 MG tablet    FLEXERIL    42 tablet    Take 1 tablet (5 mg) by mouth 3 times daily as needed for muscle spasms    Abdominal pain, generalized       diclofenac 1 % Gel topical gel    VOLTAREN     2 g Apply 2 g to skin four times a day as needed (to affected upper extremity joint(s)). Maximum 8g/day per joint, 16g/day total.        dicyclomine 10 MG capsule    BENTYL    40 capsule    TAKE ONE CAPSULE BY MOUTH EVERY 6 HOURS AS NEEDED    Abdominal pain, epigastric       docusate sodium 100 MG capsule    DOK    120 capsule    Take 1 capsule (100 mg) by mouth daily as needed for constipation    AP (abdominal pain)       dronabinol  2.5 MG capsule    MARINOL    56 capsule    Take 2 capsules (5 mg) by mouth 2 times daily as needed    Routine health maintenance       * DULoxetine 30 MG EC capsule    CYMBALTA    90 capsule    Take 1 capsule (30 mg) by mouth daily With 60mg capsule for total dose of 90mg    Major depressive disorder, recurrent episode, moderate (H), CIERRA (generalized anxiety disorder)       * DULoxetine 60 MG EC capsule    CYMBALTA    90 capsule    Take 1 capsule (60 mg) by mouth daily With 30mg capsule for total dose of 90mg    Major depressive disorder, recurrent episode, moderate (H), CIERRA (generalized anxiety disorder)       fluconazole 200 MG tablet    DIFLUCAN    15 tablet    Take 2 tabs the first day and 1 tab for the next 13 days        furosemide 20 MG tablet    LASIX    180 tablet    Take 1 tablet (20 mg) by mouth 2 times daily    Edema, unspecified type       glucagon 1 MG kit    GLUCAGON EMERGENCY    1 mg    Inject 1 mg into the muscle once for 1 dose    Hypoglycemia unawareness in type 1 diabetes mellitus (H), Type 1 diabetes mellitus with hypoglycemic coma (H)       hydrocortisone 10 MG tablet    CORTEF    60 tablet    Take 10 mg in the morning and 10 mg at bedtime. Watch for hypoglycemia recurrence.    Hypoglycemia, Adrenal insufficiency (H)       insulin aspart 100 UNITS/ML injection    NovoLOG VIAL    36 vial    As directed in pump    Type 1 diabetes mellitus with complications (H)       insulin pen needle 31G X 5 MM     2 Box    RX# [id number]  Pen Needle UC 31G UF IV Mini  Use 4-8 needles per day for insulin injections.    Chronic abdominal pain       levothyroxine 112 MCG tablet    SYNTHROID/LEVOTHROID    90 tablet    Take 1 tablet (112 mcg) by mouth daily    Hypothyroidism       linaclotide 145 MCG capsule    LINZESS    30 capsule    Take 1 capsule (145 mcg) by mouth every morning (before breakfast)    Constipation, unspecified constipation type, Chronic back pain, unspecified back location, unspecified back pain  laterality, Physical deconditioning, Primary insomnia       metoclopramide 5 MG tablet    REGLAN    100 tablet    Take 2 tablets (10 mg) by mouth 3 times daily (before meals    Routine health maintenance       nystatin 378547 unit/mL Susp suspension    MYCOSTATIN    60 mL    Take 1 mL (100,000 Units) by mouth 4 times daily    Odynophagia       ondansetron 4 MG ODT tab    ZOFRAN-ODT    60 tablet    DISSOLVE ONE TABLET ON THE TONGUE EVERY 6 HOURS AS NEEDED FOR NAUSEA    Nausea       pantoprazole 40 MG EC tablet    PROTONIX    180 tablet    Take 1 tablet (40 mg) by mouth 2 times daily    H. pylori infection       polyethylene glycol Packet    MIRALAX/GLYCOLAX    14 each    Take 1 packet by mouth 2 times daily as needed for constipation    Chronic constipation       potassium chloride SA 20 MEQ CR tablet    K-DUR/KLOR-CON M    90 tablet    Take 1 tablet (20 mEq) by mouth daily    Hypokalemia       pregabalin 150 MG capsule    LYRICA    90 capsule    Take 1 capsule (150 mg) by mouth 3 times daily    Chronic generalized abdominal pain       senna 8.6 MG tablet    SENNA LAX    90 tablet    Take 1-2 tablets by mouth daily as needed for constipation    Other constipation       sodium bicarbonate 650 MG tablet     135 tablet    Take one-half tablet (325 mg), via feeding tube every 4 hours.    Malnutrition (H)       SUMAtriptan 50 MG tablet    IMITREX    30 tablet    Take 1 tablet (50 mg) by mouth at onset of headache for migraine Take 1 Tab by mouth Once as needed for Migraine Headache. May repeat after two hours.  Maximum dose 200 mg/24 hours.    Migraine       topiramate 100 MG tablet    TOPAMAX    180 tablet    Take 1 tablet (100 mg) by mouth 2 times daily    Migraine, unspecified, not intractable, without status migrainosus       traZODone 100 MG tablet    DESYREL    100 tablet    Take 1-2 tablets (100-200 mg) by mouth At Bedtime *AN HOUR BEFORE*    Primary insomnia, Constipation, unspecified constipation type, Chronic  back pain, unspecified back location, unspecified back pain laterality, Physical deconditioning       * Notice:  This list has 8 medication(s) that are the same as other medications prescribed for you. Read the directions carefully, and ask your doctor or other care provider to review them with you.

## 2017-12-13 NOTE — PATIENT INSTRUCTIONS
"1)  I am very happy to see that your hypoglycemia has completely resolved!  That makes me feel safe to restart insulin.   We know from the labwork that you had high amounts of exogenous insulin (injected insulin) but that your body was not making any of that insulin (C-peptide was low, which is the marker for the insulin our own body's make).   We are not sure why that happened because we can't identify any incident of getting extra insulin dose.    2)  Since your #s are now 200-300 range, we will restart insulin.  But we are restarting at a much lower dose before because of that hypoglycemia event.   Start insulin pump at:  Basal rate:     Standard = on tube feeds, run at 0.35 unit per hour (down from 0.80)     Pattern A = when off tube feeds, run at 0.10 unit per hour  Bolus:     New correction factor (ISF) of 125 mg/dL, which means you get 1 unit for each 125 points in BG above target     New carb ratio of 40g    3)  How to change between the \"STANDARD\" and \"PATTERN A\":  -- go to basal menu and 'select patterns'.   -- press act on the standard or pattern A, depending which one you need  -- When pattern A is on (the OFF tube feed pattern), the screen will have a [place] up top to indicate a pattern basal.    4)  Let me know in a few days how this is going.    5)  Also get re-established with pain, and with psychology.    Follow up:  Thursday 3/1 at 9:20am.  "

## 2017-12-13 NOTE — Clinical Note
Chantell could not tell me who was coordinating her TF-- she says no one.  She wants to work towards getting off.  She might benefit from dietitian visit.

## 2017-12-17 NOTE — PROGRESS NOTES
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     Post-pancreatectomy diabetes (H)     Type 1 diabetes mellitus (H)     Migraine     Abdominal muscle strain     CIERRA (generalized anxiety disorder)     Major depressive disorder, recurrent episode, moderate (H)     CMC DJD(carpometacarpal degenerative joint disease), localized primary     Exocrine pancreatic insufficiency     Hypothyroidism     Hypoglycemia     Mood disorder due to a general medical condition     Decreased oral intake     Odynophagia     Iron deficiency     Anemia, iron deficiency     Adrenal insufficiency (H)     S/P hernia repair     Nausea       HPI:  Chantell is a 51 year old female here for follow up of total pancreatectomy and islet autotransplant performed on January 6, 2012.  At the time of the procedure, the patient received 420,000 IEQ, or 5,438 IEQ/kg body weight.  She did not have diabetes before the procedure.  Early post-operative course was complicated by RLQ with appendicitis, eventually required appendectomy.    Despite the high islet mass transplanted, Chantell's post-operative course was initially remarkable for high insulin needs.  She in fact does have islet function, as previously documented by mixed meal tests, but she was insulin resistant, requiring high doses of insulin when on MDI therapy.  She also had frequent day to day variability, and fluctuating patterns with illness and healthier times, as well as a complex regimen, all of which make her an excellent candidate for an insulin pump which she started in Feb 2014.  Extremely concerning was an episode of severe hypoglycemia in November 2013 at which time  she was found unconscious.  Suspect at that time she was on too much basal insulin and because she was ill and not eating at the time, dropped far too low overnight.   In early 2014, she was started on an insulin pump with CGM.  Remarkably, her insulin needs have dropped dramatically on an insulin pump.  She was then relatively stable until 2016.  She was again admitted to the hospital on March 15 and March 29, 2016 for hypoglycemia, that appears to be secondary to both exogenous insulin and possible central adrenal insufficiency.   At that time she had the following lab findings:  On 3/29/16, elevated insulin with low C-peptide during BG 42 mg/dL around 5pm, and then again same pattern with BG 50s at 10pm.  Chantell is pretty clear that she did not take insuiln that day on 3/29/16. She also had three cortisol levels-- including one during hypoglycemia, one at around 4am (possibly also during hypoglycemia) and one 8am draw that were all low-- all 0.6- 1.6 range.    Of note, she did have a kenalog injection one week earlier on 3/24/16, just a few days prior to that draw and was also receiving narcotics in hospital (but not at home).  She has since had another severe hypogylcemic episode in Jan 2017 -- did not lose consciousness but was disoriented and unable to get help for herself at the store.    Picture is also complicated by non-diabetes history which includes the following :  - 7/6/2016 EGD identified diffuse candidiasis through entire esophagous.  Pt has also had recurrent oral thrush in 2016  - Recurrent chronic abdominal pain  - Recurrent vomiting of unclear etiology but G-T placed 10/2016 and converted to GJ 11/2016 with symptomatic improvement  Unclear if she has any gastroparesis.  Suboptimal study in March 2016 showed 100% retention at 1 hour and then rapid emptying.    - Hernia repair performed by general surgery 9/15/16 ([facility]PI[facility] team not involved).    - Chronic abdominal pain      New history today:  1)   Diabetes:    Chantell was admitted to the [facility] last week with severe hypoglycemia.  She reports that she had removed her pump and stopped tube feeds on 11/29/17.  She woke up in the AM with gel on her face and her  and son told her that she was breathing funny, not waking up, was hypoglycemic and they gave her gel and glucagon x 2.  Despite those events her  and son went to work and left her at home, though Chantell reports her  was calling to check in.  She went to the ED at  on 11/30 for persistent recurrent hypoglycemia and was ultimately admitted.  From 11/30 to 12/1 she was on tube feeds + D10 at 140 mL/hour + received 8 amps of D50% for low treatment.  She had two insulin/ C-peptide levels drawn, not hypogylcemic at time of draw but first on 11/30 returned as C-peptide low at 0.5 and insulin level elevated at 91 and on 12/1 C-peptide <0.1 and insulin .64.7.   These lab results and clinical course are definitive for exogenous insulin overdose.  Chantell denies giving any extra insulin, denies that anyone else would have given her insuiln, denies pump malfunction, denies thoughts of self harm.   Since discharge from  hospital her hypoglycemia is completely resolved.  She has remained off her insulin pump, restarted tube feeds, and is running -300 range.     Current insuiln:  Off currently    Feeds:  Isosource 1.5 at 40 mL/hour  Wt Readings from Last 4 Encounters:   12/13/17 67.4 kg (148 lb 9.6 oz)   11/24/17 68.3 kg (150 lb 8 oz)   10/19/17 67.9 kg (149 lb 12.8 oz)   08/30/17 63.5 kg (140 lb)     Body mass index is 27.18 kg/(m^2).      Recent hemoglobin A1c levels:  Lab Results   Component Value Date    A1C 6.4 10/19/2017    A1C 5.9 01/19/2017    A1C 6.8 11/16/2016    A1C 6.7 09/15/2016    A1C 9.1 08/03/2016           2)  Adrenal insufficiency:  Still unclear if this was transient or true central AI but we have been treating her regardless due to SHE history.  Since our last  visit, we have maintained her on 20 mg/day (10 BID) of cortef, with Body surface area is 1.72 meters squared. For daily dose of : 12 mg/m2/day.   This was temporarily increased to stress dose for hypoglycemia during hospital admit    3)  Other new concerns include:   - she would like to work towards getting off tube feeds.  Unfortunately it seems there is no point person coordinating her tube feeds.  She has not seen a dietitian  - is getting established with psychology        Review of systems:  Review Of Systems  Skin: negative  Eyes: negative  Ears/Nose/Throat: negative  Respiratory: No shortness of breath, dyspnea on exertion, cough, or hemoptysis  Cardiovascular: negative  Gastrointestinal: chronic abdominal pain + hernia  Genitourinary: negative  Musculoskeletal: negative  Neurologic: negative  Psychiatric: negative  Hematologic/Lymphatic/Immunologic: negative  Endocrine: as above    Past Medical and Surgical History:  Past Medical History:   Diagnosis Date     Chronic abdominal pain      Chronic pancreatitis (H)     S/P pancreatectomy     Depression with anxiety      Diabetes mellitus (H) 1/2012     Gastro-oesophageal reflux disease      Hypothyroidism 4/23/2015     Kidney stones      Low serum cortisol level (H)      Migraines      Other chronic pain     STOMACH     Other chronic pain     LUMBAR SPINE     Spasm of sphincter of Oddi      Past Surgical History:   Procedure Laterality Date     ARTHROPLASTY CARPOMETACARPAL (THUMB JOINT)  5/2/2014    Procedure: ARTHROPLASTY CARPOMETACARPAL (THUMB JOINT);  Surgeon: Carina Panda MD;  Location: [facility] OR     CHOLECYSTECTOMY  2004     COLONOSCOPY  7/18/2014    Procedure: COLONOSCOPY;  Surgeon: Aurora Sahu MD;  Location:  GI     COLONOSCOPY N/A 8/1/2017    Procedure: COLONOSCOPY;  Colonoscopy and upper endoscopy;  Surgeon: Deirdre Harris MD;  Location:  GI     ENDOSCOPIC RETROGRADE CHOLANGIOPANCREATOGRAM       ENDOSCOPIC  RETROGRADE CHOLANGIOPANCREATOGRAM  4/19/2011    Procedure:ENDOSCOPIC RETROGRADE CHOLANGIOPANCREATOGRAM; Pancreatic Stent Placement       ENDOSCOPIC RETROGRADE CHOLANGIOPANCREATOGRAM  5/26/2011    Procedure:ENDOSCOPIC RETROGRADE CHOLANGIOPANCREATOGRAM; with Pancreatic Stent Removal; Surgeon:DALE MIMS; Location:[facility]     ENDOSCOPY UPPER, COLONOSCOPY, COMBINED  4/25/2012    Procedure:COMBINED ENDOSCOPY UPPER, COLONOSCOPY; Enteroscopy with Bile Duct Stent Removal, Colonoscopy  *Latex Safe Room*; Surgeon:GRACY GODWINIQ; Location:[facility]     ESOPHAGOSCOPY, GASTROSCOPY, DUODENOSCOPY (EGD), COMBINED  5/26/2011    Procedure:COMBINED ESOPHAGOSCOPY, GASTROSCOPY, DUODENOSCOPY (EGD); Surgeon:DALE MIMS; [facility]     ESOPHAGOSCOPY, GASTROSCOPY, DUODENOSCOPY (EGD), COMBINED N/A 10/30/2014    Procedure: COMBINED ESOPHAGOSCOPY, GASTROSCOPY, DUODENOSCOPY (EGD), BIOPSY SINGLE OR MULTIPLE;  Surgeon: Sarai Moon MD;  Location:  GI     ESOPHAGOSCOPY, GASTROSCOPY, DUODENOSCOPY (EGD), COMBINED Left 7/6/2015    Procedure: COMBINED ESOPHAGOSCOPY, GASTROSCOPY, DUODENOSCOPY (EGD), BIOPSY SINGLE OR MULTIPLE;  Surgeon: Thomas Estrada MD;  Location:  GI     ESOPHAGOSCOPY, GASTROSCOPY, DUODENOSCOPY (EGD), COMBINED N/A 7/8/2016    Procedure: COMBINED ESOPHAGOSCOPY, GASTROSCOPY, DUODENOSCOPY (EGD), BIOPSY SINGLE OR MULTIPLE;  Surgeon: Eloy Klein MD;  Location:  GI     ESOPHAGOSCOPY, GASTROSCOPY, DUODENOSCOPY (EGD), COMBINED N/A 8/4/2016    Procedure: COMBINED ESOPHAGOSCOPY, GASTROSCOPY, DUODENOSCOPY (EGD), BIOPSY SINGLE OR MULTIPLE;  Surgeon: Jason Brown MD;  Location:  GI     ESOPHAGOSCOPY, GASTROSCOPY, DUODENOSCOPY (EGD), COMBINED N/A 8/1/2017    Procedure: COMBINED ESOPHAGOSCOPY, GASTROSCOPY, DUODENOSCOPY (EGD);;  Surgeon: Deirdre Harris MD;  Location:  GI     GYN SURGERY      Hysterectomy and USO     [facility] ENDOSCOPY W EUS  7/20/2011     Procedure:COMBINED ENDOSCOPIC ULTRASOUND, ESOPHAGOSCOPY, GASTROSCOPY, DUODENOSCOPY (EGD); Surgeon:DARVIN DONOHUE; Location:[facility]     HERNIORRHAPHY VENTRAL N/A 9/15/2016    Procedure: HERNIORRHAPHY VENTRAL;  Surgeon: Juanita Bernabe MD;  [facility]: [facility]     HYSTERECTOMY  1997 or 1998    USO     INCISION AND DRAINAGE ABDOMEN WASHOUT, COMBINED  8/16/2012    Procedure: COMBINED INCISION AND DRAINAGE ABDOMEN WASHOUT;  ,debridement and Drainage Post Appendectomy;  Surgeon: Ron Austin MD;  [facility]: [facility]     INJECT TRANSVERSUS ABDOMINIS PLANE (TAP) BLOCK BILATERAL Bilateral 5/26/2016    Procedure: INJECT TRANSVERSUS ABDOMINIS PLANE (TAP) BLOCK BILATERAL;  Surgeon: Leonard Mccallum MD;  [facility]     LAPAROSCOPIC APPENDECTOMY  7/30/2012    Procedure: LAPAROSCOPIC APPENDECTOMY;  Open Appendectomy;  Surgeon: Ron Austin MD;  [facility]     PANCREATECTOMY, TRANSPLANT AUTO ISLET CELL, COMBINED  1/6/2012    Procedure:COMBINED PANCREATECTOMY, TRANSPLANT AUTO ISLET CELL; Total  Pancreatectomy, Auto Islet Transplant, splenectomy, 18fr. transgastric-jejunal feeding tube placement, liver biopsy; Surgeon:PALAK LEE; Location[facility]     REPLACE GASTROSTOMY TUBE, PERCUTANEOUS N/A 8/30/2017    Procedure: REPLACE GASTROSTOMY TUBE, PERCUTANEOUS;  GJ Tube Change;  Surgeon: Jose Nath PA-C;  [facility]: [facility]     SPLENECTOMY         Family History:  New changes since last visit:  none  Family History   Problem Relation Age of Onset     Hypertension Mother      DIABETES Mother      OSTEOPOROSIS Mother      CANCER Father      pancreatic cancer     DIABETES Maternal Grandmother      Cardiovascular Maternal Grandmother      CANCER Maternal Grandfather      lung cancer     CANCER Sister      brain     CANCER Sister      liver cancer       Social History:  Social History     Social History Narrative     Unchanged. Lives in [place] with her .  Has 5 grandchildren  "      Physical Exam:  Vitals: /75  Pulse 76  Temp 98.2  F (36.8  C) (Oral)  Resp 16  Ht 1.575 m (5' 2\")  Wt 67.4 kg (148 lb 9.6 oz)  SpO2 98%  BMI 27.18 kg/m2  BMI= Body mass index is 27.18 kg/(m^2).     General:  Well-appearing, NAD  Head: NC/AT  Eyes: sclera white  Abdomen is tender  Neuro:  Normal mental status, normal gross motor  Psych:  Communicative, with normal affect     Results:  Mixed Meal Test:  C Peptide   Date Value Ref Range Status   10/19/2017 2.4 0.9 - 6.9 ng/mL Final   01/19/2017 1.9 0.9 - 6.9 ng/mL Final   01/19/2017 2.2 0.9 - 6.9 ng/mL Final   01/19/2017 1.5 0.9 - 6.9 ng/mL Final   04/07/2016 2.3 0.9 - 6.9 ng/mL Final     Glucose   Date Value Ref Range Status   11/24/2017 44 (LL) 70 - 99 mg/dL Final     Comment:     Critical Value called to and read back by  CLIFFORD DAWKINS MD 11/24/2017 @0958 BY      10/19/2017 202 (H) 70 - 99 mg/dL Final   08/01/2017 120 (H) 70 - 99 mg/dL Final   01/19/2017 140 (H) 70 - 99 mg/dL Final   01/19/2017 153 (H) 70 - 99 mg/dL Final       Hemoglobin A1c  Lab Results   Component Value Date    A1C 6.4 10/19/2017    A1C 5.9 01/19/2017    A1C 6.8 11/16/2016    A1C 6.7 09/15/2016    A1C 9.1 08/03/2016         Liver enzymes  AST       17   1/19/2017  ALT       20   1/19/2017  BILICONJ      0.0   5/20/2014   BILITOTAL      0.2   1/19/2017  ALBUMIN      3.4   1/19/2017  PROTTOTAL      6.6   1/19/2017   ALKPHOS       64   1/19/2017    Creatinine:  Creatinine   Date Value Ref Range Status   11/24/2017 1.01 0.52 - 1.04 mg/dL Final   ]    Complete Blood Count:  CBC RESULTS:   Recent Labs   Lab Test  01/19/17   0739   WBC  5.2   RBC  3.34*   HGB  10.0*   HCT  30.6*   MCV  92   MCH  29.9   MCHC  32.7   RDW  17.0*   PLT  531*       Cholesterol  CHOL      236   4/14/2016  HDL      100   4/14/2016  LDL      120   4/14/2016  TRIG       80   4/14/2016  CHOLHDLRATIO      3.2   1/8/2015        Assessment:  1. Post-pancreatectomy diabetes mellitus - partial islet graft " function but highly variable insulin needs depending on health status  2.  S/p islet transplant with partial function (autologous)   3.  Possible central adrenal insufficiency due to low cortisol during hypoglycemia (also suspect excess exogenous insulin exposure at that time), has not yet been retested, remains on treatment  4.  Hypoglycemia unawareness        Chantell is a 51 year old with history of chronic pancreatitis who is s/p total pancreatectomy and islet autotransplant, complicated by insulin resistance, and several episodes of severe hypoglycemia with partial islet graft function.       I am very concerned about recent severe hypoglycemia requiring hospital admission.  This was due to exogenous insulin.  Chantell denies any intentional overdose of insulin and denies self-harm.  She is now with stable BG.  We will restart pump with very low settings and have her follow up with #s.    She also would like to get off tube feeds.  I can't figure out who is directing the tube feed plan-  Suggest we have her get in with a dietitian to assist with assessing oral intake and calorie needs.          Plan:  1.  Changes to current diabetes regimen:  Patient Instructions   1)  I am very happy to see that your hypoglycemia has completely resolved!  That makes me feel safe to restart insulin.   We know from the labwork that you had high amounts of exogenous insulin (injected insulin) but that your body was not making any of that insulin (C-peptide was low, which is the marker for the insulin our own body's make).   We are not sure why that happened because we can't identify any incident of getting extra insulin dose.    2)  Since your #s are now 200-300 range, we will restart insulin.  But we are restarting at a much lower dose before because of that hypoglycemia event.   Start insulin pump at:  Basal rate:     Standard = on tube feeds, run at 0.35 unit per hour (down from 0.80)     Pattern A = when off tube feeds, run at 0.10  "unit per hour  Bolus:     New correction factor (ISF) of 125 mg/dL, which means you get 1 unit for each 125 points in BG above target     New carb ratio of 40g    3)  How to change between the \"STANDARD\" and \"PATTERN A\":  -- go to basal menu and 'select patterns'.   -- press act on the standard or pattern A, depending which one you need  -- When pattern A is on (the OFF tube feed pattern), the screen will have a [place] up top to indicate a pattern basal.    4)  Let me know in a few days how this is going.    5)  Also get re-established with pain, and with psychology.    Follow up:  Thursday 3/1 at 9:20am.      2.  Frequency of blood sugar checks:  Premeal, bedtime, and additional measurements PRN-- Should have strips sufficient to test 8 times per day given her labiltiy history.    3.  Continue routine follow up for autoislet transplant patients:  Mixed meal test (6 mL/kg BoostHP to max of 360 mL) at 3 months, 6 months, and once a year post transplant.  Hemoglobin A1c levels at these time points and quarterly.    4.  Other issues addressed today:  As above        Follow up:  3 months    Contact me for questions at [phone] or [phone].  Emergency number to reach pediatric endocrinology after hours is [phone].        Amena Maher MD  , Pediatric Endocrinology and Diabetes  [facility] [place]  [facility]      VISIT TIME:  30 minutes of face to face time, >50% spent in counseling and coordination of care  "

## 2018-01-02 ENCOUNTER — TELEPHONE (OUTPATIENT)
Dept: TRANSPLANT | Facility: CLINIC | Age: 53
End: 2018-01-02

## 2018-01-02 NOTE — TELEPHONE ENCOUNTER
email with Chantell for dose changes    Thanks, Chantell.    Let's make these changes:  -- In the basal rates, go up to 0.7 unit/hour for the on tube feeds (standard rate)  -- in the bolus wizard, make these changes:         - change ISF to 80        - change carb ratio to 30 grams (make sure to use this if you eat-- it is a lot less than your previous settings still so it should not overdo it)        Amena Maher MD  [facility] [place]   Pediatric Endocrinology  Director of Research, Islet Autotransplant Program  Phone:  [phone]  Fax:  [phone]  [email or web address]    On Tue, Jan 2, 2018 at 12:54 PM, Chantell Kidd <[email or web address]> wrote:  Good Afternoon,         Here are my sugars           1/1    177, 300, 232, 342         12/31    230, 185, 445         12/30    202, 344, 193         12/29    [phone]         12/28    [phone]

## 2018-01-09 ENCOUNTER — ALLIED HEALTH/NURSE VISIT (OUTPATIENT)
Dept: TRANSPLANT | Facility: CLINIC | Age: 53
End: 2018-01-09
Attending: PEDIATRICS
Payer: MEDICARE

## 2018-01-09 VITALS — BODY MASS INDEX: 28.28 KG/M2 | WEIGHT: 154.6 LBS

## 2018-01-09 DIAGNOSIS — E13.9 POST-PANCREATECTOMY DIABETES (H): Primary | ICD-10-CM

## 2018-01-09 DIAGNOSIS — Z90.410 POST-PANCREATECTOMY DIABETES (H): Primary | ICD-10-CM

## 2018-01-09 DIAGNOSIS — E89.1 POST-PANCREATECTOMY DIABETES (H): Primary | ICD-10-CM

## 2018-01-09 PROCEDURE — 97802 MEDICAL NUTRITION INDIV IN: CPT | Mod: ZF | Performed by: DIETITIAN, REGISTERED

## 2018-01-09 NOTE — MR AVS SNAPSHOT
After Visit Summary   1/9/2018    Chantell Kidd    MRN: [id number]           Patient Information     Date Of Birth          1965        Visit Information        Provider Department      1/9/2018 9:00 AM Shasha Potts RD [facility] Solid Organ Transplant        Today's Diagnoses     Post-pancreatectomy diabetes (H)    -  1       Follow-ups after your visit        Your next 10 appointments already scheduled     Jan 29, 2018  6:40 PM CST   (Arrive by 6:25 PM)   Return Visit with Ron Morgan MD   [facility] ([facility])    [address]  4th Floor  [facility] [id number] [phone]            Mar 01, 2018  9:20 AM CST   (Arrive by 9:05 AM)   Return Auto Islet with Amena Maher MD   [facility] Solid Organ Transplant ([facility])    [address] [place] [address]  [address]  [facility] [address] [phone]              Who to contact     If you have questions or need follow up information about today's clinic visit or your schedule please contact [facility] TRANSPL[facility] directly at [phone].  Normal or non-critical lab and imaging results will be communicated to you by MyChart, letter or phone within 4 business days after the clinic has received the results. If you do not hear from us within 7 days, please contact the clinic through [employer] or phone. If you have a critical or abnormal lab result, we will notify you by phone as soon as possible.  Submit refill requests through [employer] or call your pharmacy and they will forward the refill request to us. Please allow 3 business days for your refill to be completed.          Additional Information About Your Visit        MyChart Information     [employer] gives you secure access to your electronic health record. If you see a primary care provider, you can also send messages to your care team and make appointments. If you have questions, please  call your primary care clinic.  If you do not have a primary care provider, please call [phone] and they will assist you.        Care EveryWhere ID     This is your Care EveryWhere ID. This could be used by other organizations to access your [place] medical records  [id number]        Your Vitals Were     BMI (Body Mass Index)                   28.28 kg/m2            Blood Pressure from Last 3 Encounters:   12/13/17 112/75   11/24/17 99/68   10/19/17 111/73    Weight from Last 3 Encounters:   01/09/18 70.1 kg (154 lb 9.6 oz)   12/13/17 67.4 kg (148 lb 9.6 oz)   11/24/17 68.3 kg (150 lb 8 oz)              We Performed the Following     MNT INDIVIDUAL INITIAL EA 15 MIN        Primary Care Provider Office Phone # Fax #    Ron Morgan [phone]       [address] [place] [address]        Equal Access to Services     BRIDGETTE JOHNSON : Hadii aad ku hadasho Soomaali, waaxda luqadaha, qaybta kaalmada adeegyada, waxay joanain haymadelynn braxton christianson . So [facility] [phone].    ATENCIÓN: Si habla español, tiene a webb disposición servicios gratuitos de asistencia lingüística. Llame al [phone].    We comply with applicable federal civil rights laws and [place] laws. We do not discriminate on the basis of race, color, national origin, age, disability, sex, sexual orientation, or gender identity.            Thank you!     Thank you for choosing [facility]  for your care. Our goal is always to provide you with excellent care. Hearing back from our patients is one way we can continue to improve our services. Please take a few minutes to complete the written survey that you may receive in the mail after your visit with us. Thank you!             Your Updated Medication List - Protect others around you: Learn how to safely use, store and throw away your medicines at www.disposemymeds.org.          This list is accurate as of: 1/9/18  9:44 AM.  Always use  your most recent med list.                   Brand Name Dispense Instructions for use Diagnosis    acetaminophen 500 MG Caps      Take 1,000 mg by mouth three times a day as needed.        alendronate 70 MG tablet    FOSAMAX    4 tablet    Take 1 tablet (70 mg) by mouth every 7 days On Sundays take first thing in the morning with plain water and remain upright for at least 30 minutes and until after first food of the day  Do not restart Fosamax (alendronate) until your difficulty swallowing has resolved and you have finished the entire course of fluconazole (Diflucan).    Osteoporosis       alum & mag hydroxide-simethicone 200-200-25 MG Chew chewable tablet    GELUSIL    100 tablet    CHEW AND SWALLOW 2 CHEWS EVERY 4 HOURS AS NEEDED FOR INDIGESTION    Abdominal pain       amylase-lipase-protease 22815 UNITS Cpep    CREON 12    2160 capsule    Take 6 capsules with meals and 3 with snacks.  This is up to 24 capsules per day.    Exocrine pancreatic insufficiency       aspirin 81 MG tablet      Take 81 mg by mouth daily.        blood glucose monitoring lancets     102 each    by Lancet route. Use to test blood sugar daily or as directed.    Chronic abdominal pain       * blood glucose monitoring test strip    no brand specified    240 each    Use to test blood glucoses 6-8 times per day.    Post-pancreatectomy diabetes (H)       * blood glucose monitoring test strip    NOEMI CONTOUR NEXT    240 each    Use to test blood sugar 8 times daily.    Post-pancreatectomy diabetes (H)       * BOOST HIGH PROTEIN Liqd      After above baseline labs are drawn, give: 6 mL/kg to maximum of 360 mL; the beverage is to be consumed within 5 minutes.    Post-pancreatectomy diabetes (H), Pancreatic insufficiency, Vitamin D deficiency       * BOOST HIGH PROTEIN Liqd      After above baseline labs are drawn, give: 6 mL/kg to maximum of 360 mL; the beverage is to be consumed within 5 minutes.    Post-pancreatectomy diabetes (H), Pancreatic  insufficiency       * BOOST HIGH PROTEIN Liqd      Also can use Ensure clear (available over the counter)    Pancreatic insufficiency       * BOOST HIGH PROTEIN Liqd      After above baseline labs are drawn, give: 6 mL/kg to maximum of 360 mL; the beverage is to be consumed within 5 minutes.    Post-pancreatectomy diabetes (H), Vitamin D deficiency, unspecified       cyclobenzaprine 5 MG tablet    FLEXERIL    42 tablet    Take 1 tablet (5 mg) by mouth 3 times daily as needed for muscle spasms    Abdominal pain, generalized       diclofenac 1 % Gel topical gel    VOLTAREN     2 g Apply 2 g to skin four times a day as needed (to affected upper extremity joint(s)). Maximum 8g/day per joint, 16g/day total.        dicyclomine 10 MG capsule    BENTYL    40 capsule    TAKE ONE CAPSULE BY MOUTH EVERY 6 HOURS AS NEEDED    Abdominal pain, epigastric       docusate sodium 100 MG capsule    DOK    120 capsule    Take 1 capsule (100 mg) by mouth daily as needed for constipation    AP (abdominal pain)       dronabinol 2.5 MG capsule    MARINOL    56 capsule    Take 2 capsules (5 mg) by mouth 2 times daily as needed    Routine health maintenance       * DULoxetine 30 MG EC capsule    CYMBALTA    90 capsule    Take 1 capsule (30 mg) by mouth daily With 60mg capsule for total dose of 90mg    Major depressive disorder, recurrent episode, moderate (H), CIERRA (generalized anxiety disorder)       * DULoxetine 60 MG EC capsule    CYMBALTA    90 capsule    Take 1 capsule (60 mg) by mouth daily With 30mg capsule for total dose of 90mg    Major depressive disorder, recurrent episode, moderate (H), CIERRA (generalized anxiety disorder)       fluconazole 200 MG tablet    DIFLUCAN    15 tablet    Take 2 tabs the first day and 1 tab for the next 13 days        furosemide 20 MG tablet    LASIX    180 tablet    Take 1 tablet (20 mg) by mouth 2 times daily    Edema, unspecified type       glucagon 1 MG kit    GLUCAGON EMERGENCY    1 mg    Inject 1 mg  into the muscle once for 1 dose    Hypoglycemia unawareness in type 1 diabetes mellitus (H), Type 1 diabetes mellitus with hypoglycemic coma (H)       hydrocortisone 10 MG tablet    CORTEF    60 tablet    Take 10 mg in the morning and 10 mg at bedtime. Watch for hypoglycemia recurrence.    Hypoglycemia, Adrenal insufficiency (H)       insulin aspart 100 UNITS/ML injection    NovoLOG VIAL    36 vial    As directed in pump    Type 1 diabetes mellitus with complications (H)       insulin pen needle 31G X 5 MM     2 Box    RX# [id number]  Pen Needle UC 31G UF IV Mini  Use 4-8 needles per day for insulin injections.    Chronic abdominal pain       levothyroxine 112 MCG tablet    SYNTHROID/LEVOTHROID    90 tablet    Take 1 tablet (112 mcg) by mouth daily    Hypothyroidism       linaclotide 145 MCG capsule    LINZESS    30 capsule    Take 1 capsule (145 mcg) by mouth every morning (before breakfast)    Constipation, unspecified constipation type, Chronic back pain, unspecified back location, unspecified back pain laterality, Physical deconditioning, Primary insomnia       metoclopramide 5 MG tablet    REGLAN    100 tablet    Take 2 tablets (10 mg) by mouth 3 times daily (before meals    Routine health maintenance       nystatin 760416 unit/mL Susp suspension    MYCOSTATIN    60 mL    Take 1 mL (100,000 Units) by mouth 4 times daily    Odynophagia       ondansetron 4 MG ODT tab    ZOFRAN-ODT    60 tablet    DISSOLVE ONE TABLET ON THE TONGUE EVERY 6 HOURS AS NEEDED FOR NAUSEA    Nausea       pantoprazole 40 MG EC tablet    PROTONIX    180 tablet    Take 1 tablet (40 mg) by mouth 2 times daily    H. pylori infection       polyethylene glycol Packet    MIRALAX/GLYCOLAX    14 each    Take 1 packet by mouth 2 times daily as needed for constipation    Chronic constipation       potassium chloride SA 20 MEQ CR tablet    K-DUR/KLOR-CON M    90 tablet    Take 1 tablet (20 mEq) by mouth daily    Hypokalemia       pregabalin 150 MG  capsule    LYRICA    90 capsule    Take 1 capsule (150 mg) by mouth 3 times daily    Chronic generalized abdominal pain       senna 8.6 MG tablet    SENNA LAX    90 tablet    Take 1-2 tablets by mouth daily as needed for constipation    Other constipation       sodium bicarbonate 650 MG tablet     135 tablet    Take one-half tablet (325 mg), via feeding tube every 4 hours.    Malnutrition (H)       SUMAtriptan 50 MG tablet    IMITREX    30 tablet    Take 1 tablet (50 mg) by mouth at onset of headache for migraine Take 1 Tab by mouth Once as needed for Migraine Headache. May repeat after two hours.  Maximum dose 200 mg/24 hours.    Migraine       topiramate 100 MG tablet    TOPAMAX    180 tablet    Take 1 tablet (100 mg) by mouth 2 times daily    Migraine, unspecified, not intractable, without status migrainosus       traZODone 100 MG tablet    DESYREL    100 tablet    Take 1-2 tablets (100-200 mg) by mouth At Bedtime *AN HOUR BEFORE*    Primary insomnia, Constipation, unspecified constipation type, Chronic back pain, unspecified back location, unspecified back pain laterality, Physical deconditioning       * Notice:  This list has 8 medication(s) that are the same as other medications prescribed for you. Read the directions carefully, and ask your doctor or other care provider to review them with you.

## 2018-01-09 NOTE — PROGRESS NOTES
Outpatient MNT: Post Islet Transplant    Time Spent: 15 minutes  Visit Type: Initial  Referring Physician: Dr Alves  Pt accompanied by: self    Txp hx: islet 1/2012    Nutrition Assessment/Diet Recall  Pt had FT placed 10/2016 and has been running Iso 1.5 @ 60 ml/hr x 18-19 hours/day. This was providing 1080 ml formula, 1620 calories, and 68 g PRO/day (>100% calorie/protein needs). Pt reports stopping feeds last Wednesday, 1/3. She started taking PO 2 months ago after meeting with Dr Maher. She reports appetite is not the best, even with d/c of feeds in the past week. Food is not appealing all the time. She does c/o vomiting, a few times/day, which has been present since surgery. It is not always correlated with PO, may vomit right after eating or a few hours later. She plans to f/u with Dr Alves about this. She tried keeping a food journal to see if her vomiting was tied to any certain foods, but this was not helpful.     Pt is taking 8 Creon 12 with meals (increased a few months ago from 6). This provides 1371 ul/kg/meal - within therapeutic range. Pt was mixing 8 Creon in with TF also.     Breakfast 1 piece of plain toast    Lunch Leftovers, soup, yogurt, raw veggies or piece of fruit    Dinner chix with potatoes or spaghetti    Snacks Trying to implement snacks more routinely now    Beverages Water and coffee    Alcohol None    Dining out None      Anthropometrics  Height:   62 in   BMI:    28.2    Weight Status:Overweight BMI 25-29.9   Weight:  155 lbs            IBW (lb): 110  % IBW: 141    Wt Hx: Weight has been increasing. Last weight in clinic 12/13/17 of 148 lbs. Weight prior to FT ~130s. Pt's ideal weight is 140-145 lbs.     Adj/dosing BW: 121 lbs/55 kg         Estimated Nutrition Needs  Energy  6455-2775     (25-30 kcal/kg dosing BW for maintenance needs)       Protein  44-55    (0.8-1 g/kg for increased needs post txp)           Fluid  1 ml/kcal or per MD     Nutrition Diagnosis  Predicted suboptimal  nutrient intake (calorie, protein) r/t reduced appetite, food not appealing, vomiting, pt stopping TF which was providing 100% calorie and protein needs with oral intake likely not making up the difference.     Nutrition Intervention  Discussed taking FT out vs leaving it in w/o feeds running to assess PO intakes moving forward. Pt feels comfortable removing tube and replacing if needed if weight does decline significantly or nutrition declines. I think pt will do well w/o feeds and we discussed importance of keeping a food journal to ensure adequate intake, trying protein shakes/smoothies or supplements (emailed ideas to pt) for added substance and nutrition, etc. Will f/u with Leslie about plan and to coordinate visit/check in with pt when she is here next. I am ok with pt dropping some weight to her ideal weight ~145 lbs, however, we discussed that she should avoid losing weight by just not eating from not feeling well or d/t food not being appealing. Pt understands and reports that she also plans to start exercising more routinely, as she does currently have a gym membership.     Patient Understanding: Pt verbalized understanding of education provided.  Expected Compliance: Good  Follow-Up Plans: PRN     Nutrition Goals  1. PO intakes to meet 100% calorie/protein needs with TF stopped  2. Weight not to drop below 145 lbs     Provided pt with contact info.   Shasha Potts RD, LD  [facility] [phone]

## 2018-01-26 ENCOUNTER — TELEPHONE (OUTPATIENT)
Dept: SURGERY | Facility: CLINIC | Age: 53
End: 2018-01-26

## 2018-01-26 DIAGNOSIS — E55.9 VITAMIN D DEFICIENCY: ICD-10-CM

## 2018-01-26 DIAGNOSIS — K86.89 PANCREATIC INSUFFICIENCY: Primary | ICD-10-CM

## 2018-01-26 DIAGNOSIS — Z13.21 ENCOUNTER FOR VITAMIN DEFICIENCY SCREENING: ICD-10-CM

## 2018-01-26 DIAGNOSIS — K90.9 MALABSORPTION: ICD-10-CM

## 2018-01-26 DIAGNOSIS — Z90.410 POST-PANCREATECTOMY DIABETES (H): ICD-10-CM

## 2018-01-26 DIAGNOSIS — E89.1 POST-PANCREATECTOMY DIABETES (H): ICD-10-CM

## 2018-01-26 DIAGNOSIS — E13.9 POST-PANCREATECTOMY DIABETES (H): ICD-10-CM

## 2018-01-26 NOTE — TELEPHONE ENCOUNTER
Per task, I called the patient and discussed scheduling a follow up appointment for her to see Dr. Del Valle in clinic to remove her feeding tube. I let her know that I would have to schedule a lab appointment. She asked if that could be completed closer to home and I told her that I would have to speak to Leslie Cortez, RNCC for Dr. Del Valle. I let her know that Leslie or I would call her to discuss it.     I spoke to Leslie and she said she would follow up with the patient.

## 2018-01-26 NOTE — TELEPHONE ENCOUNTER
----- Message from Nanci Cortez RN sent at 1/25/2018  3:59 PM CST -----  Can you schedule removal of this with a surgeon in clinic next week ?    I need to get labs a day before so Tuesday onwards ?    If you can let Chantell and myself know that would be great    Thanks   ----- Message -----     From: Chantell Garcia RN     Sent: 1/12/2018  12:08 PM       To: CARLOS Bates,    Yes, GS could remove it- generally in the clinic.  She may need to see someone else since Anna is done at the end of the month.  She is relocating to [place].    Chantell  ----- Message -----     From: Nanci Cortez RN     Sent: 1/12/2018  11:46 AM       To: Chantell Garcia RN, Amena Maher MD, #    Chantell,Melena    This patient has a feeding tube put in by Darshan Rodriguez over a year ago, during a hospital admission.. No one has really been monitoring her since . I think I am going to have her get a whole nutrition panel BEFORE we pull it to make sure she is optimal.( Amena ~ ok with this idea ? )     She doesn't want it and dietician thinks she can live with out it.    Would general surgery be the ones to remove it ? She really doesn't follow with her original surgeon

## 2018-01-31 ENCOUNTER — TELEPHONE (OUTPATIENT)
Dept: SURGERY | Facility: CLINIC | Age: 53
End: 2018-01-31

## 2018-01-31 NOTE — TELEPHONE ENCOUNTER
Established Patient Telephone Reminder Call    Date of call:  01/31/18  Phone numbers:  Home number on file [phone] (home)    Reached patient/confirmed appointment:  Yes  Appointment with:   Dr. Pablo Del Valle  Reason for visit:  Post op-feeding tube

## 2018-02-01 ENCOUNTER — OFFICE VISIT (OUTPATIENT)
Dept: SURGERY | Facility: CLINIC | Age: 53
End: 2018-02-01
Payer: MEDICARE

## 2018-02-01 VITALS
WEIGHT: 153.7 LBS | SYSTOLIC BLOOD PRESSURE: 143 MMHG | OXYGEN SATURATION: 98 % | HEART RATE: 69 BPM | HEIGHT: 62 IN | TEMPERATURE: 98.4 F | BODY MASS INDEX: 28.29 KG/M2 | DIASTOLIC BLOOD PRESSURE: 80 MMHG

## 2018-02-01 DIAGNOSIS — E55.9 VITAMIN D DEFICIENCY: ICD-10-CM

## 2018-02-01 DIAGNOSIS — E13.9 POST-PANCREATECTOMY DIABETES (H): ICD-10-CM

## 2018-02-01 DIAGNOSIS — Z13.21 ENCOUNTER FOR VITAMIN DEFICIENCY SCREENING: ICD-10-CM

## 2018-02-01 DIAGNOSIS — K90.9 MALABSORPTION: ICD-10-CM

## 2018-02-01 DIAGNOSIS — E89.1 POST-PANCREATECTOMY DIABETES (H): ICD-10-CM

## 2018-02-01 DIAGNOSIS — K86.89 PANCREATIC INSUFFICIENCY: ICD-10-CM

## 2018-02-01 DIAGNOSIS — Z90.410 POST-PANCREATECTOMY DIABETES (H): ICD-10-CM

## 2018-02-01 DIAGNOSIS — E46 MALNUTRITION, UNSPECIFIED TYPE (H): Primary | ICD-10-CM

## 2018-02-01 LAB
ALBUMIN SERPL-MCNC: 3.8 G/DL (ref 3.4–5)
ALP SERPL-CCNC: 134 U/L (ref 40–150)
ALT SERPL W P-5'-P-CCNC: 40 U/L (ref 0–50)
ANION GAP SERPL CALCULATED.3IONS-SCNC: 6 MMOL/L (ref 3–14)
AST SERPL W P-5'-P-CCNC: 45 U/L (ref 0–45)
BASOPHILS # BLD AUTO: 0.1 10E9/L (ref 0–0.2)
BASOPHILS NFR BLD AUTO: 0.6 %
BILIRUB SERPL-MCNC: 0.4 MG/DL (ref 0.2–1.3)
BUN SERPL-MCNC: 7 MG/DL (ref 7–30)
CALCIUM SERPL-MCNC: 9 MG/DL (ref 8.5–10.1)
CHLORIDE SERPL-SCNC: 105 MMOL/L (ref 94–109)
CO2 SERPL-SCNC: 30 MMOL/L (ref 20–32)
CREAT SERPL-MCNC: 1.51 MG/DL (ref 0.52–1.04)
DIFFERENTIAL METHOD BLD: ABNORMAL
EOSINOPHIL # BLD AUTO: 0.1 10E9/L (ref 0–0.7)
EOSINOPHIL NFR BLD AUTO: 1.2 %
ERYTHROCYTE [DISTWIDTH] IN BLOOD BY AUTOMATED COUNT: 13.5 % (ref 10–15)
FERRITIN SERPL-MCNC: 34 NG/ML (ref 8–252)
GFR SERPL CREATININE-BSD FRML MDRD: 36 ML/MIN/1.7M2
GLUCOSE SERPL-MCNC: 143 MG/DL (ref 70–99)
HBA1C MFR BLD: 7.5 % (ref 4.3–6)
HCT VFR BLD AUTO: 37.2 % (ref 35–47)
HGB BLD-MCNC: 12.2 G/DL (ref 11.7–15.7)
IMM GRANULOCYTES # BLD: 0 10E9/L (ref 0–0.4)
IMM GRANULOCYTES NFR BLD: 0.2 %
IRON SATN MFR SERPL: 23 % (ref 15–46)
IRON SERPL-MCNC: 74 UG/DL (ref 35–180)
LYMPHOCYTES # BLD AUTO: 3.5 10E9/L (ref 0.8–5.3)
LYMPHOCYTES NFR BLD AUTO: 43.5 %
MCH RBC QN AUTO: 31 PG (ref 26.5–33)
MCHC RBC AUTO-ENTMCNC: 32.8 G/DL (ref 31.5–36.5)
MCV RBC AUTO: 94 FL (ref 78–100)
MONOCYTES # BLD AUTO: 0.6 10E9/L (ref 0–1.3)
MONOCYTES NFR BLD AUTO: 7.7 %
NEUTROPHILS # BLD AUTO: 3.8 10E9/L (ref 1.6–8.3)
NEUTROPHILS NFR BLD AUTO: 46.8 %
NRBC # BLD AUTO: 0 10*3/UL
NRBC BLD AUTO-RTO: 0 /100
PLATELET # BLD AUTO: 464 10E9/L (ref 150–450)
POTASSIUM SERPL-SCNC: 3.6 MMOL/L (ref 3.4–5.3)
PREALB SERPL IA-MCNC: 17 MG/DL (ref 15–45)
PROT SERPL-MCNC: 7.9 G/DL (ref 6.8–8.8)
RBC # BLD AUTO: 3.94 10E12/L (ref 3.8–5.2)
SODIUM SERPL-SCNC: 140 MMOL/L (ref 133–144)
TIBC SERPL-MCNC: 326 UG/DL (ref 240–430)
VIT B12 SERPL-MCNC: 1071 PG/ML (ref 193–986)
WBC # BLD AUTO: 8.1 10E9/L (ref 4–11)

## 2018-02-01 PROCEDURE — 84681 ASSAY OF C-PEPTIDE: CPT | Performed by: PEDIATRICS

## 2018-02-01 PROCEDURE — 82306 VITAMIN D 25 HYDROXY: CPT | Performed by: PEDIATRICS

## 2018-02-01 ASSESSMENT — ENCOUNTER SYMPTOMS
JAUNDICE: 0
HEMATURIA: 1
DEPRESSION: 1
ABDOMINAL PAIN: 1
BLOOD IN STOOL: 1
DECREASED CONCENTRATION: 0
FEVER: 1
STIFFNESS: 1
NERVOUS/ANXIOUS: 1
FATIGUE: 1
RECTAL PAIN: 0
DIFFICULTY URINATING: 1
DIARRHEA: 1
INSOMNIA: 1
WEIGHT LOSS: 0
MYALGIAS: 1
DECREASED APPETITE: 0
POLYPHAGIA: 0
WEIGHT GAIN: 1
ARTHRALGIAS: 1
POLYDIPSIA: 1
MUSCLE WEAKNESS: 1
BACK PAIN: 1
CONSTIPATION: 0
DYSURIA: 1
HEARTBURN: 1
VOMITING: 1
MUSCLE CRAMPS: 1
HALLUCINATIONS: 0
BLOATING: 1
INCREASED ENERGY: 0
BOWEL INCONTINENCE: 0
NECK PAIN: 0
NIGHT SWEATS: 1
PANIC: 1
CHILLS: 1
NAUSEA: 1
ALTERED TEMPERATURE REGULATION: 1
JOINT SWELLING: 1
BRUISES/BLEEDS EASILY: 1
SWOLLEN GLANDS: 0
FLANK PAIN: 1

## 2018-02-01 ASSESSMENT — PAIN SCALES - GENERAL: PAINLEVEL: NO PAIN (0)

## 2018-02-01 NOTE — NURSING NOTE
"Chief Complaint   Patient presents with     Clinic Care Coordination - Follow-up     Removing feeding tube.        Vitals:    02/01/18 1510   BP: 143/80   BP Location: Left arm   Patient Position: Chair   Cuff Size: Adult Regular   Pulse: 69   Temp: 98.4  F (36.9  C)   TempSrc: Oral   SpO2: 98%   Weight: 153 lb 11.2 oz   Height: 5' 2\"       Body mass index is 28.11 kg/(m^2).    Rosalia REEVES LPN                  "

## 2018-02-01 NOTE — LETTER
2018       RE: Chantell Kidd  [address]  [facility] [address]     Dear Colleague,    Thank you for referring your patient, Chantell Kidd, to the [facility] SURGERY at [facility]. Please see a copy of my visit note below.    Service Date: 2018      HISTORY OF PRESENT ILLNESS:  I was asked to see Ms. Kidd to pull her gastrostomy tube.  This has been in place for about the last year and a half.  She has been eating without the G tube for the last several weeks.  She denies nausea, vomiting, diarrhea or constipation.      PHYSICAL EXAMINATION:  The G-tube site is clean and dry.  The G tube was removed without difficulty and a sterile dressing applied.  The patient tolerated removal of the G tube without complications.        I sent her home with 4 x 4's and tape to reinforce if needed.  I discussed with her the slight risk that the G-tube site may continue to drain long-term, and I am happy to see her if the drainage continues past about a month or so.         KAPIL REYES MD             D: 2018   T: 2018   MT: chacha      Name:     CHANTELL KIDD   MRN:      [id number]        Account:      [id number]   :      1965           Service Date: 2018      Document: [id number]       Again, thank you for allowing me to participate in the care of your patient.      Sincerely,    Kapil Reyes MD

## 2018-02-02 LAB — C PEPTIDE SERPL-MCNC: 1.5 NG/ML (ref 0.9–6.9)

## 2018-02-02 NOTE — PROGRESS NOTES
Service Date: 2018      HISTORY OF PRESENT ILLNESS:  I was asked to see Ms. Kidd to pull her gastrostomy tube.  This has been in place for about the last year and a half.  She has been eating without the G tube for the last several weeks.  She denies nausea, vomiting, diarrhea or constipation.      PHYSICAL EXAMINATION:  The G-tube site is clean and dry.  The G tube was removed without difficulty and a sterile dressing applied.  The patient tolerated removal of the G tube without complications.        I sent her home with 4 x 4's and tape to reinforce if needed.  I discussed with her the slight risk that the G-tube site may continue to drain long-term, and I am happy to see her if the drainage continues past about a month or so.         ROBIN REYES MD             D: 2018   T: 2018   MT: chacha      Name:     MONET KIDD   MRN:      [id number]        Account:      [id number]   :      1965           Service Date: 2018      Document: [id number]

## 2018-02-03 LAB
FOLATE RBC-MCNC: 525 NG/ML
HCT VFR BLD CALC: 37.2 %

## 2018-02-04 LAB
A-TOCOPHEROL VIT E SERPL-MCNC: 9.1 MG/L (ref 5.5–18)
ANNOTATION COMMENT IMP: NORMAL
BETA+GAMMA TOCOPHEROL SERPL-MCNC: 0.8 MG/L (ref 0–6)
RETINYL PALMITATE SERPL-MCNC: <0.02 MG/L (ref 0–0.1)
VIT A SERPL-MCNC: 0.32 MG/L (ref 0.3–1.2)

## 2018-02-07 LAB
DEPRECATED CALCIDIOL+CALCIFEROL SERPL-MC: <46 UG/L (ref 20–75)
VITAMIN D2 SERPL-MCNC: <5 UG/L
VITAMIN D3 SERPL-MCNC: 41 UG/L

## 2018-02-20 ENCOUNTER — TELEPHONE (OUTPATIENT)
Dept: TRANSPLANT | Facility: CLINIC | Age: 53
End: 2018-02-20

## 2018-02-20 NOTE — TELEPHONE ENCOUNTER
Called Chantell, she is feeling well, no issues. Asked her to get labs before her appointment with Dr Maher next week--she has a slightly elevated Cr from 2/1/labs. She will als bring her meter for downloading.

## 2018-03-01 ENCOUNTER — OFFICE VISIT (OUTPATIENT)
Dept: TRANSPLANT | Facility: CLINIC | Age: 53
End: 2018-03-01
Attending: PEDIATRICS
Payer: MEDICARE

## 2018-03-01 VITALS
DIASTOLIC BLOOD PRESSURE: 73 MMHG | TEMPERATURE: 99.1 F | BODY MASS INDEX: 28.08 KG/M2 | HEART RATE: 79 BPM | RESPIRATION RATE: 16 BRPM | SYSTOLIC BLOOD PRESSURE: 109 MMHG | HEIGHT: 62 IN | WEIGHT: 152.6 LBS | OXYGEN SATURATION: 96 %

## 2018-03-01 DIAGNOSIS — E89.1 POST-PANCREATECTOMY DIABETES (H): Primary | ICD-10-CM

## 2018-03-01 DIAGNOSIS — E13.9 POST-PANCREATECTOMY DIABETES (H): Primary | ICD-10-CM

## 2018-03-01 DIAGNOSIS — Z90.410 POST-PANCREATECTOMY DIABETES (H): Primary | ICD-10-CM

## 2018-03-01 LAB
ANION GAP SERPL CALCULATED.3IONS-SCNC: 8 MMOL/L (ref 3–14)
BUN SERPL-MCNC: 8 MG/DL (ref 7–30)
CALCIUM SERPL-MCNC: 9 MG/DL (ref 8.5–10.1)
CHLORIDE SERPL-SCNC: 106 MMOL/L (ref 94–109)
CO2 SERPL-SCNC: 25 MMOL/L (ref 20–32)
CREAT SERPL-MCNC: 0.67 MG/DL (ref 0.52–1.04)
GFR SERPL CREATININE-BSD FRML MDRD: >90 ML/MIN/1.7M2
GLUCOSE SERPL-MCNC: 226 MG/DL (ref 70–99)
POTASSIUM SERPL-SCNC: 3.6 MMOL/L (ref 3.4–5.3)
SODIUM SERPL-SCNC: 138 MMOL/L (ref 133–144)

## 2018-03-01 PROCEDURE — 80048 BASIC METABOLIC PNL TOTAL CA: CPT | Performed by: PEDIATRICS

## 2018-03-01 PROCEDURE — G0463 HOSPITAL OUTPT CLINIC VISIT: HCPCS | Mod: ZF

## 2018-03-01 PROCEDURE — 36415 COLL VENOUS BLD VENIPUNCTURE: CPT | Performed by: PEDIATRICS

## 2018-03-01 ASSESSMENT — PAIN SCALES - GENERAL: PAINLEVEL: NO PAIN (0)

## 2018-03-01 NOTE — PATIENT INSTRUCTIONS
1)  I want you to work on:  -- Getting 4 BG checks each day-- we may even be required to submit this documentation to get your new pump  -- Covering the carbs for the breakfast and the dinner.    2)  Anticipating covering the carbs, we made these changes  - reduced your basal rate from 0.7 to 0.6 unit per hour  - changed your I:C ratio from 30g to 40g.    3)  We talked about the 670g pump-- I think this is a good way to go, you would at least get the sensor with that which would be good for you with your severe hypoglycemia history and unawareness.  You are not ready for the closed loop pump mode yet, but if you start checking 4 times per day and covering those two meals, we may be able to get you into the closed loop mode (where the basal automatically adjusts to the sensor) .    4)  You will need diabetes education for the new pump.  I will talk to the educators out at [place] to see if it is possible to set it up there.      Follow up:  Thursday June 21 at 9:20    Preventive Care:    Breast Cancer Screening: During our visit today, we discussed that it is recommended you receive breast cancer screening. Please call or make an appointment with your primary care provider to discuss this with them. You may also call the [facility] scheduling line ([phone]) to set up a mammography appointment at the Breast [facility] within the [facility].

## 2018-03-01 NOTE — PROGRESS NOTES
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     Post-pancreatectomy diabetes (H)     Type 1 diabetes mellitus (H)     Migraine     Abdominal muscle strain     CIERRA (generalized anxiety disorder)     Major depressive disorder, recurrent episode, moderate (H)     CMC DJD(carpometacarpal degenerative joint disease), localized primary     Exocrine pancreatic insufficiency     Hypothyroidism     Hypoglycemia     Mood disorder due to a general medical condition     Decreased oral intake     Odynophagia     Iron deficiency     Anemia, iron deficiency     Adrenal insufficiency (H)     S/P hernia repair     Nausea       HPI:  Chantell is a 52 year old female here for follow up of total pancreatectomy and islet autotransplant performed on January 6, 2012.  At the time of the procedure, the patient received 420,000 IEQ, or 5,438 IEQ/kg body weight.  She did not have diabetes before the procedure.  Early post-operative course was complicated by RLQ with appendicitis, eventually required appendectomy.    Despite the high islet mass transplanted, Chantell's post-operative course was initially remarkable for high insulin needs.  She in fact does have islet function, as previously documented by mixed meal tests, but she was insulin resistant, requiring high doses of insulin when on MDI therapy.  She also had frequent day to day variability, and fluctuating patterns with illness and healthier times, as well as a complex regimen, all of which make her an excellent candidate for an insulin pump which she started in Feb 2014.  Extremely concerning was an episode of severe hypoglycemia in November 2013 at which time  she was found unconscious.  Suspect at that time she was on too much basal insulin and because she was ill and not eating at the time, dropped far too low overnight.   In early 2014, she was started on an insulin pump with CGM.  Remarkably, her insulin needs have dropped dramatically on an insulin pump.  She was then relatively stable until 2016.  She was again admitted to the hospital on March 15 and March 29, 2016 for hypoglycemia, that appears to be secondary to both exogenous insulin and possible central adrenal insufficiency.   At that time she had the following lab findings:  On 3/29/16, elevated insulin with low C-peptide during BG 42 mg/dL around 5pm, and then again same pattern with BG 50s at 10pm.  Chantell is pretty clear that she did not take insuiln that day on 3/29/16. She also had three cortisol levels-- including one during hypoglycemia, one at around 4am (possibly also during hypoglycemia) and one 8am draw that were all low-- all 0.6- 1.6 range.    Of note, she did have a kenalog injection one week earlier on 3/24/16, just a few days prior to that draw and was also receiving narcotics in hospital (but not at home).  She has since had another severe hypogylcemic episode in Jan 2017 -- did not lose consciousness but was disoriented and unable to get help for herself at the store.  And again was admitted for severe hypoglycemia at [facility] on 11/29/2017 (refer to 12/13/17 note) with labs consistent with exogenous insulin overdose although she denied taking any excess insulin (12/1 C-peptide <0.1 and insulin .64.7, 11/30 C-peptide 0.5 and insulin 91).    Picture is also complicated by non-diabetes history which includes the following :  - 7/6/2016 EGD identified diffuse candidiasis through entire esophagous.  Pt has also had recurrent oral thrush in 2016  - Recurrent chronic abdominal pain  - Recurrent vomiting of unclear etiology but G-T placed 10/2016 and converted to GJ 11/2016 with symptomatic  improvement  Unclear if she has any gastroparesis.  Suboptimal study in March 2016 showed 100% retention at 1 hour and then rapid emptying.    - Hernia repair performed by general surgery 9/15/16 (TPIAT team not involved).    - Chronic abdominal pain      New history today:  1)  Diabetes:    We had decreased Chantell's insulin doses substantially in Dec due to severe hypoglycemia and hospitalization.  That resulted eventually in significant hyperglycemia and we had to increase back up on insulin.  A1c is high today. She is now off all tube feeds and her GJ tube has been pulled.  She is still missing prescribed BG checks, does better some days than others.  Still has hypo unawareness and had one rapid drop from 182 to 43 mg/dL just with basal (no bolus) on Saturday with no symptoms.  Her current pump is not functioning-- buttons are not always responsive and link is not working always with meter.  She would like to get 670g.  This would be good for her to have the safety features of suspending for hypoglycemia.  She would need diabetes ed.      Current insuiln:      Feeds: NO TF!  Reviewed oral diet:  Mostly eats breakfast (yogurt) and whatever family is having for dinner (example, quarter hamburger and rice)  Wt Readings from Last 4 Encounters:   03/01/18 69.2 kg (152 lb 9.6 oz)   02/01/18 69.7 kg (153 lb 11.2 oz)   01/09/18 70.1 kg (154 lb 9.6 oz)   12/13/17 67.4 kg (148 lb 9.6 oz)     Body mass index is 27.91 kg/(m^2).      Recent hemoglobin A1c levels:  Lab Results   Component Value Date    A1C 7.5 02/01/2018    A1C 6.7 12/13/2017    A1C 6.4 10/19/2017    A1C 5.9 01/19/2017    A1C 6.8 11/16/2016         2)  Adrenal insufficiency:  Still unclear if this was transient or true central AI but we have been treating her regardless due to SHE history.  Since our last visit, we have maintained her on 20 mg/day (10 BID) of cortef, with Body surface area is 1.74 meters squared. For daily dose of : 12 mg/m2/day.      3)  Other    - c/o abdominal pain only with eating large meals today. Not using pain meds.        Review of systems:  Review Of Systems  Skin: negative  Eyes: negative  Ears/Nose/Throat: negative  Respiratory: No shortness of breath, dyspnea on exertion, cough, or hemoptysis  Cardiovascular: negative  Gastrointestinal: chronic abdominal pain + hernia  Genitourinary: negative  Musculoskeletal: negative  Neurologic: negative  Psychiatric: negative  Hematologic/Lymphatic/Immunologic: negative  Endocrine: as above    Past Medical and Surgical History:  Past Medical History:   Diagnosis Date     Chronic abdominal pain      Chronic pancreatitis (H)     S/P pancreatectomy     Depression with anxiety      Diabetes mellitus (H) 1/2012     Gastro-oesophageal reflux disease      Hypothyroidism 4/23/2015     Kidney stones      Low serum cortisol level (H)      Migraines      Other chronic pain     STOMACH     Other chronic pain     LUMBAR SPINE     Spasm of sphincter of Oddi      Past Surgical History:   Procedure Laterality Date     ARTHROPLASTY CARPOMETACARPAL (THUMB JOINT)  5/2/2014    Procedure: ARTHROPLASTY CARPOMETACARPAL (THUMB JOINT);  Surgeon: Carina Panda MD;  Location: [facility] OR     CHOLECYSTECTOMY  2004     COLONOSCOPY  7/18/2014    Procedure: COLONOSCOPY;  Surgeon: Auorra Sahu MD;  Location:  GI     COLONOSCOPY N/A 8/1/2017    Procedure: COLONOSCOPY;  Colonoscopy and upper endoscopy;  Surgeon: Deirdre Harris MD;  Location: [facility]     ENDOSCOPIC RETROGRADE CHOLANGIOPANCREATOGRAM       ENDOSCOPIC RETROGRADE CHOLANGIOPANCREATOGRAM  4/19/2011    Procedure:ENDOSCOPIC RETROGRADE CHOLANGIOPANCREATOGRAM; Pancreatic Stent Placement       ENDOSCOPIC RETROGRADE CHOLANGIOPANCREATOGRAM  5/26/2011    Procedure:ENDOSCOPIC RETROGRADE CHOLANGIOPANCREATOGRAM; with Pancreatic Stent Removal; Surgeon:DALE MIMS; [facility]     ENDOSCOPY UPPER, COLONOSCOPY, COMBINED  4/25/2012     Procedure:COMBINED ENDOSCOPY UPPER, COLONOSCOPY; Enteroscopy with Bile Duct Stent Removal, Colonoscopy  *Late[facility] Safe Room*; Surgeon:GRACY GODWIN; Location[facility]     ESOPHAGOSCOPY, GASTROSCOPY, DUODENOSCOPY (EGD), COMBINED  5/26/2011    Procedure:COMBINED ESOPHAGOSCOPY, GASTROSCOPY, DUODENOSCOPY (EGD); Surgeon:DALE MIMS; [facility]     ESOPHAGOSCOPY, GASTROSCOPY, DUODENOSCOPY (EGD), COMBINED N/A 10/30/2014    Procedure: COMBINED ESOPHAGOSCOPY, GASTROSCOPY, DUODENOSCOPY (EGD), BIOPSY SINGLE OR MULTIPLE;  Surgeon: Sarai Moon MD;  [facility]: [facility]     ESOPHAGOSCOPY, GASTROSCOPY, DUODENOSCOPY (EGD), COMBINED Left 7/6/2015    Procedure: COMBINED ESOPHAGOSCOPY, GASTROSCOPY, DUODENOSCOPY (EGD), BIOPSY SINGLE OR MULTIPLE;  Surgeon: Thomas Estrada MD;  [facility]     ESOPHAGOSCOPY, GASTROSCOPY, DUODENOSCOPY (EGD), COMBINED N/A 7/8/2016    Procedure: COMBINED ESOPHAGOSCOPY, GASTROSCOPY, DUODENOSCOPY (EGD), BIOPSY SINGLE OR MULTIPLE;  Surgeon: Eloy Klein MD;  [facility]     ESOPHAGOSCOPY, GASTROSCOPY, DUODENOSCOPY (EGD), COMBINED N/A 8/4/2016    Procedure: COMBINED ESOPHAGOSCOPY, GASTROSCOPY, DUODENOSCOPY (EGD), BIOPSY SINGLE OR MULTIPLE;  Surgeon: Jason Brown MD;  [facility]     ESOPHAGOSCOPY, GASTROSCOPY, DUODENOSCOPY (EGD), COMBINED N/A 8/1/2017    Procedure: COMBINED ESOPHAGOSCOPY, GASTROSCOPY, DUODENOSCOPY (EGD);;  Surgeon: Deirdre Harris MD;  Location:  GI     GYN SURGERY      Hysterectomy and USO     [facility] ENDOSCOPY W EUS  7/20/2011    Procedure:COMBINED ENDOSCOPIC ULTRASOUND, ESOPHAGOSCOPY, GASTROSCOPY, DUODENOSCOPY (EGD); Surgeon:DARVIN DONOHUE; Location: GI     HERNIORRHAPHY VENTRAL N/A 9/15/2016    Procedure: HERNIORRHAPHY VENTRAL;  Surgeon: Juanita Bernabe MD;  [facility]     HYSTERECTOMY  1997 or 1998    USO     INCISION AND DRAINAGE ABDOMEN WASHOUT, COMBINED  8/16/2012    Procedure: COMBINED  "INCISION AND DRAINAGE ABDOMEN WASHOUT;  ,debridement and Drainage Post Appendectomy;  Surgeon: Ron Austin MD;  [facility]     INJECT TRANSVERSUS ABDOMINIS PLANE (TAP) BLOCK BILATERAL Bilateral 5/26/2016    Procedure: INJECT TRANSVERSUS ABDOMINIS PLANE (TAP) BLOCK BILATERAL;  Surgeon: Leonard Mccallum MD;  [facility]     LAPAROSCOPIC APPENDECTOMY  7/30/2012    Procedure: LAPAROSCOPIC APPENDECTOMY;  Open Appendectomy;  Surgeon: Ron Austin MD;  [facility]     PANCREATECTOMY, TRANSPLANT AUTO ISLET CELL, COMBINED  1/6/2012    Procedure:COMBINED PANCREATECTOMY, TRANSPLANT AUTO ISLET CELL; Total  Pancreatectomy, Auto Islet Transplant, splenectomy, 18fr. transgastric-jejunal feeding tube placement, liver biopsy; Surgeon:PALAK LEE; [facility]     REPLACE GASTROSTOMY TUBE, PERCUTANEOUS N/A 8/30/2017    Procedure: REPLACE GASTROSTOMY TUBE, PERCUTANEOUS;  GJ Tube Change;  Surgeon: Jose Nath PA-C;  [facility]     SPLENECTOMY         Family History:  New changes since last visit:  none  Family History   Problem Relation Age of Onset     Hypertension Mother      DIABETES Mother      OSTEOPOROSIS Mother      CANCER Father      pancreatic cancer     DIABETES Maternal Grandmother      Cardiovascular Maternal Grandmother      CANCER Maternal Grandfather      lung cancer     CANCER Sister      brain     CANCER Sister      liver cancer       Social History:  Social History     Social History Narrative     Unchanged. Lives in [place] with her .  Has 5 grandchildren       Physical Exam:  Vitals: /73 (BP Location: Right arm, Patient Position: Sitting, Cuff Size: Adult Regular)  Pulse 79  Temp 99.1  F (37.3  C) (Oral)  Resp 16  Ht 1.575 m (5' 2\")  Wt 69.2 kg (152 lb 9.6 oz)  SpO2 96%  BMI 27.91 kg/m2  BMI= Body mass index is 27.91 kg/(m^2).     General:  Well-appearing, NAD  Head: NC/AT  Eyes: sclera white  Neuro:  Normal mental status, normal " gross motor  Psych:  Communicative, with normal affect     Results:  Lab Results   Component Value Date    A1C 7.5 02/01/2018    A1C 6.7 12/13/2017    A1C 6.4 10/19/2017    A1C 5.9 01/19/2017    A1C 6.8 11/16/2016       C-Gegihbo53/1/2017  [facility]   Component Name Value Ref Range   C-PEPTIDE  <0.1 (L)   Comment:    Test Performed by:  [facility]  [address] [place] [address] 1.1 - 4.4 ng/mL    Specimen   Blood     Simultaneous glucose 12/1/17 = 81 mg/dL      Assessment:  1. Post-pancreatectomy diabetes mellitus - partial islet graft function but highly variable insulin needs depending on health status, and elevated A1c today.  2.  Treated for central adrenal insufficiency.      Chantell is a 52 year old with history of chronic pancreatitis who is s/p total pancreatectomy and islet autotransplant, complicated by insulin resistance, and several episodes of severe hypoglycemia with partial islet graft function.  Historically has done much better on insulin pump than when she was on injections, so I would like her to continue on a pump.  She is due for a new pump model and is interested in 670g.  Because of her severe hypoglycemia I would highly support this. However, we discussed that she would not be able to into auto mode until she is covering carbs.    Made dose changes today but with caveat that she must cover her two main meals!  Chantell verbalized understanding.          Plan:  1.  Changes to current diabetes regimen:  Patient Instructions   1)  I want you to work on:  -- Getting 4 BG checks each day-- we may even be required to submit this documentation to get your new pump  -- Covering the carbs for the breakfast and the dinner.    2)  Anticipating covering the carbs, we made these changes  - reduced your basal rate from 0.7 to 0.6 unit per hour  - changed your I:C ratio from 30g to 40g.    3)  We talked about the 670g pump-- I think this is a good  way to go, you would at least get the sensor with that which would be good for you with your severe hypoglycemia history and unawareness.  You are not ready for the closed loop pump mode yet, but if you start checking 4 times per day and covering those two meals, we may be able to get you into the closed loop mode (where the basal automatically adjusts to the sensor) .    4)  You will need diabetes education for the new pump.  I will talk to the educators out at [facility] to see if it is possible to set it up there.      Follow up:  Thursday June 21 at 9:20    Preventive Care:    Breast Cancer Screening: During our visit today, we discussed that it is recommended you receive breast cancer screening. Please call or make an appointment with your primary care provider to discuss this with them. You may also call the [facility] scheduling line ([phone]) to set up a mammography appointment at the [facility] within the [facility].            2.  Frequency of blood sugar checks:  Premeal, bedtime, and additional measurements PRN-- Should have strips sufficient to test 8 times per day given her labiltiy history.    3.  Continue routine follow up for autoislet transplant patients:  Mixed meal test (6 mL/kg BoostHP to max of 360 mL) at 3 months, 6 months, and once a year post transplant.  Hemoglobin A1c levels at these time points and quarterly.    4.  Other issues addressed today:  As above        Follow up:  3 months    Contact me for questions at [phone] or [phone].  Emergency number to reach pediatric endocrinology after hours is [phone].        Amena Maher MD  , Pediatric Endocrinology and Diabetes  [facility] [place]  [facility]      VISIT TIME:  25 minutes of face to face time, >50% spent in counseling and coordination of care

## 2018-03-01 NOTE — NURSING NOTE
"Chief Complaint   Patient presents with     RECHECK     Islet TX 1/6/2012       Initial /73 (BP Location: Right arm, Patient Position: Sitting, Cuff Size: Adult Regular)  Pulse 79  Temp 99.1  F (37.3  C) (Oral)  Resp 16  Ht 1.575 m (5' 2\")  Wt 69.2 kg (152 lb 9.6 oz)  SpO2 96%  BMI 27.91 kg/m2 Estimated body mass index is 27.91 kg/(m^2) as calculated from the following:    Height as of this encounter: 1.575 m (5' 2\").    Weight as of this encounter: 69.2 kg (152 lb 9.6 oz).  Medication Reconciliation: complete     Kim Hart CMA  3/1/2018 9:26 AM        "

## 2018-03-01 NOTE — LETTER
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     Post-pancreatectomy diabetes (H)     Type 1 diabetes mellitus (H)     Migraine     Abdominal muscle strain     CIERRA (generalized anxiety disorder)     Major depressive disorder, recurrent episode, moderate (H)     CMC DJD(carpometacarpal degenerative joint disease), localized primary     Exocrine pancreatic insufficiency     Hypothyroidism     Hypoglycemia     Mood disorder due to a general medical condition     Decreased oral intake     Odynophagia     Iron deficiency     Anemia, iron deficiency     Adrenal insufficiency (H)     S/P hernia repair     Nausea       HPI:  Chantell is a 52 year old female here for follow up of total pancreatectomy and islet autotransplant performed on January 6, 2012.  At the time of the procedure, the patient received 420,000 IEQ, or 5,438 IEQ/kg body weight.  She did not have diabetes before the procedure.  Early post-operative course was complicated by RLQ with appendicitis, eventually required appendectomy.    Despite the high islet mass transplanted, Chantell's post-operative course was initially remarkable for high insulin needs.  She in fact does have islet function, as previously documented by mixed meal tests, but she was insulin resistant, requiring high doses of insulin when on MDI therapy.  She also had frequent day to day variability, and fluctuating patterns with illness and healthier times, as well as a complex regimen, all of which make her an excellent candidate for an insulin pump which she started in Feb 2014.  Extremely  concerning was an episode of severe hypoglycemia in November 2013 at which time she was found unconscious.  Suspect at that time she was on too much basal insulin and because she was ill and not eating at the time, dropped far too low overnight.   In early 2014, she was started on an insulin pump with CGM.  Remarkably, her insulin needs have dropped dramatically on an insulin pump.  She was then relatively stable until 2016.  She was again admitted to the hospital on March 15 and March 29, 2016 for hypoglycemia, that appears to be secondary to both exogenous insulin and possible central adrenal insufficiency.   At that time she had the following lab findings:  On 3/29/16, elevated insulin with low C-peptide during BG 42 mg/dL around 5pm, and then again same pattern with BG 50s at 10pm.  Chantell is pretty clear that she did not take insuiln that day on 3/29/16. She also had three cortisol levels-- including one during hypoglycemia, one at around 4am (possibly also during hypoglycemia) and one 8am draw that were all low-- all 0.6- 1.6 range.    Of note, she did have a kenalog injection one week earlier on 3/24/16, just a few days prior to that draw and was also receiving narcotics in hospital (but not at home).  She has since had another severe hypogylcemic episode in Jan 2017 -- did not lose consciousness but was disoriented and unable to get help for herself at the store.  And again was admitted for severe hypoglycemia at [facility] on 11/29/2017 (refer to 12/13/17 note) with labs consistent with exogenous insulin overdose although she denied taking any excess insulin (12/1 C-peptide <0.1 and insulin .64.7, 11/30 C-peptide 0.5 and insulin 91).    Picture is also complicated by non-diabetes history which includes the following :  - 7/6/2016 EGD identified diffuse candidiasis through entire esophagous.  Pt has also had recurrent oral thrush in 2016  - Recurrent chronic abdominal pain  - Recurrent vomiting of unclear  etiology but G-T placed 10/2016 and converted to GJ 11/2016 with symptomatic improvement  Unclear if she has any gastroparesis.  Suboptimal study in March 2016 showed 100% retention at 1 hour and then rapid emptying.    - Hernia repair performed by general surgery 9/15/16 (TPIAT team not involved).    - Chronic abdominal pain      New history today:  1)  Diabetes:    We had decreased Chantell's insulin doses substantially in Dec due to severe hypoglycemia and hospitalization.  That resulted eventually in significant hyperglycemia and we had to increase back up on insulin.  A1c is high today. She is now off all tube feeds and her GJ tube has been pulled.  She is still missing prescribed BG checks, does better some days than others.  Still has hypo unawareness and had one rapid drop from 182 to 43 mg/dL just with basal (no bolus) on Saturday with no symptoms.  Her current pump is not functioning-- buttons are not always responsive and link is not working always with meter.  She would like to get 670g.  This would be good for her to have the safety features of suspending for hypoglycemia.  She would need diabetes ed.      Current insuiln:      Feeds: NO TF!  Reviewed oral diet:  Mostly eats breakfast (yogurt) and whatever family is having for dinner (example, quarter hamburger and rice)  Wt Readings from Last 4 Encounters:   03/01/18 69.2 kg (152 lb 9.6 oz)   02/01/18 69.7 kg (153 lb 11.2 oz)   01/09/18 70.1 kg (154 lb 9.6 oz)   12/13/17 67.4 kg (148 lb 9.6 oz)     Body mass index is 27.91 kg/(m^2).      Recent hemoglobin A1c levels:  Lab Results   Component Value Date    A1C 7.5 02/01/2018    A1C 6.7 12/13/2017    A1C 6.4 10/19/2017    A1C 5.9 01/19/2017    A1C 6.8 11/16/2016         2)  Adrenal insufficiency:  Still unclear if this was transient or true central AI but we have been treating her regardless due to SHE history.  Since our last visit, we have maintained her on 20 mg/day (10 BID) of cortef, with Body surface  area is 1.74 meters squared. For daily dose of : 12 mg/m2/day.      3)  Other   - c/o abdominal pain only with eating large meals today. Not using pain meds.        Review of systems:  Review Of Systems  Skin: negative  Eyes: negative  Ears/Nose/Throat: negative  Respiratory: No shortness of breath, dyspnea on exertion, cough, or hemoptysis  Cardiovascular: negative  Gastrointestinal: chronic abdominal pain + hernia  Genitourinary: negative  Musculoskeletal: negative  Neurologic: negative  Psychiatric: negative  Hematologic/Lymphatic/Immunologic: negative  Endocrine: as above    Past Medical and Surgical History:  Past Medical History:   Diagnosis Date     Chronic abdominal pain      Chronic pancreatitis (H)     S/P pancreatectomy     Depression with anxiety      Diabetes mellitus (H) 1/2012     Gastro-oesophageal reflux disease      Hypothyroidism 4/23/2015     Kidney stones      Low serum cortisol level (H)      Migraines      Other chronic pain     STOMACH     Other chronic pain     LUMBAR SPINE     Spasm of sphincter of Oddi      Past Surgical History:   Procedure Laterality Date     ARTHROPLASTY CARPOMETACARPAL (THUMB JOINT)  5/2/2014    Procedure: ARTHROPLASTY CARPOMETACARPAL (THUMB JOINT);  Surgeon: Carina Panda MD;  Location: [facility] OR     CHOLECYSTECTOMY  2004     COLONOSCOPY  7/18/2014    Procedure: COLONOSCOPY;  Surgeon: Aurora Sahu MD;  Location:  GI     COLONOSCOPY N/A 8/1/2017    Procedure: COLONOSCOPY;  Colonoscopy and upper endoscopy;  Surgeon: Deirdre Harris MD;  Location:  GI     ENDOSCOPIC RETROGRADE CHOLANGIOPANCREATOGRAM       ENDOSCOPIC RETROGRADE CHOLANGIOPANCREATOGRAM  4/19/2011    Procedure:ENDOSCOPIC RETROGRADE CHOLANGIOPANCREATOGRAM; Pancreatic Stent Placement       ENDOSCOPIC RETROGRADE CHOLANGIOPANCREATOGRAM  5/26/2011    Procedure:ENDOSCOPIC RETROGRADE CHOLANGIOPANCREATOGRAM; with Pancreatic Stent Removal; Surgeon:DALE MIMS;  Location:[facility]     ENDOSCOPY UPPER, COLONOSCOPY, COMBINED  4/25/2012    Procedure:COMBINED ENDOSCOPY UPPER, COLONOSCOPY; Enteroscopy with Bile Duct Stent Removal, Colonoscopy  *Latex Safe Room*; Surgeon:GRACY GODWIN; Location[facility]     ESOPHAGOSCOPY, GASTROSCOPY, DUODENOSCOPY (EGD), COMBINED  5/26/2011    Procedure:COMBINED ESOPHAGOSCOPY, GASTROSCOPY, DUODENOSCOPY (EGD); Surgeon:DALE MIMS; [facility]     ESOPHAGOSCOPY, GASTROSCOPY, DUODENOSCOPY (EGD), COMBINED N/A 10/30/2014    Procedure: COMBINED ESOPHAGOSCOPY, GASTROSCOPY, DUODENOSCOPY (EGD), BIOPSY SINGLE OR MULTIPLE;  Surgeon: Sarai Moon MD;  [facility]: [facility] GI     ESOPHAGOSCOPY, GASTROSCOPY, DUODENOSCOPY (EGD), COMBINED Left 7/6/2015    Procedure: COMBINED ESOPHAGOSCOPY, GASTROSCOPY, DUODENOSCOPY (EGD), BIOPSY SINGLE OR MULTIPLE;  Surgeon: Thomas Estrada MD;  Location:  GI     ESOPHAGOSCOPY, GASTROSCOPY, DUODENOSCOPY (EGD), COMBINED N/A 7/8/2016    Procedure: COMBINED ESOPHAGOSCOPY, GASTROSCOPY, DUODENOSCOPY (EGD), BIOPSY SINGLE OR MULTIPLE;  Surgeon: Eloy Klein MD;  Location:  GI     ESOPHAGOSCOPY, GASTROSCOPY, DUODENOSCOPY (EGD), COMBINED N/A 8/4/2016    Procedure: COMBINED ESOPHAGOSCOPY, GASTROSCOPY, DUODENOSCOPY (EGD), BIOPSY SINGLE OR MULTIPLE;  Surgeon: Jason Brown MD;  Location:  GI     ESOPHAGOSCOPY, GASTROSCOPY, DUODENOSCOPY (EGD), COMBINED N/A 8/1/2017    Procedure: COMBINED ESOPHAGOSCOPY, GASTROSCOPY, DUODENOSCOPY (EGD);;  Surgeon: Deirdre Harris MD;  Location:  GI     GYN SURGERY      Hysterectomy and USO     [facility] ENDOSCOPY W EUS  7/20/2011    Procedure:COMBINED ENDOSCOPIC ULTRASOUND, ESOPHAGOSCOPY, GASTROSCOPY, DUODENOSCOPY (EGD); Surgeon:DARVIN DONOHUE; Location: GI     HERNIORRHAPHY VENTRAL N/A 9/15/2016    Procedure: HERNIORRHAPHY VENTRAL;  Surgeon: Juanita Bernabe MD;  [facility]     HYSTERECTOMY  1997 or 1998    USO     INCISION  "AND DRAINAGE ABDOMEN WASHOUT, COMBINED  8/16/2012    Procedure: COMBINED INCISION AND DRAINAGE ABDOMEN WASHOUT;  ,debridement and Drainage Post Appendectomy;  Surgeon: Ron Austin MD;  [facility]     INJECT TRANSVERSUS ABDOMINIS PLANE (TAP) BLOCK BILATERAL Bilateral 5/26/2016    Procedure: INJECT TRANSVERSUS ABDOMINIS PLANE (TAP) BLOCK BILATERAL;  Surgeon: Leonard Mccallum MD;  [facility]     LAPAROSCOPIC APPENDECTOMY  7/30/2012    Procedure: LAPAROSCOPIC APPENDECTOMY;  Open Appendectomy;  Surgeon: Ron Austin MD;  [facility]     PANCREATECTOMY, TRANSPLANT AUTO ISLET CELL, COMBINED  1/6/2012    Procedure:COMBINED PANCREATECTOMY, TRANSPLANT AUTO ISLET CELL; Total  Pancreatectomy, Auto Islet Transplant, splenectomy, 18fr. transgastric-jejunal feeding tube placement, liver biopsy; Surgeon:PALAK LEE; [facility]     REPLACE GASTROSTOMY TUBE, PERCUTANEOUS N/A 8/30/2017    Procedure: REPLACE GASTROSTOMY TUBE, PERCUTANEOUS;  GJ Tube Change;  Surgeon: Jose Nath PA-C;  [facility]     SPLENECTOMY         Family History:  New changes since last visit:  none  Family History   Problem Relation Age of Onset     Hypertension Mother      DIABETES Mother      OSTEOPOROSIS Mother      CANCER Father      pancreatic cancer     DIABETES Maternal Grandmother      Cardiovascular Maternal Grandmother      CANCER Maternal Grandfather      lung cancer     CANCER Sister      brain     CANCER Sister      liver cancer       Social History:  Social History     Social History Narrative     Unchanged. Lives in [place] with her .  Has 5 grandchildren       Physical Exam:  Vitals: /73 (BP Location: Right arm, Patient Position: Sitting, Cuff Size: Adult Regular)  Pulse 79  Temp 99.1  F (37.3  C) (Oral)  Resp 16  Ht 1.575 m (5' 2\")  Wt 69.2 kg (152 lb 9.6 oz)  SpO2 96%  BMI 27.91 kg/m2  BMI= Body mass index is 27.91 kg/(m^2).     General:  Well-appearing, " NAD  Head: NC/AT  Eyes: sclera white  Neuro:  Normal mental status, normal gross motor  Psych:  Communicative, with normal affect     Results:  Lab Results   Component Value Date    A1C 7.5 02/01/2018    A1C 6.7 12/13/2017    A1C 6.4 10/19/2017    A1C 5.9 01/19/2017    A1C 6.8 11/16/2016       [id number]/1/2017  [facility]   Component Name Value Ref Range   C-PEPTIDE  <0.1 (L)   Comment:    Test Performed by:  [facility] [address]  [address] [place] [address] 1.1 - 4.4 ng/mL    Specimen   Blood     Simultaneous glucose 12/1/17 = 81 mg/dL      Assessment:  1. Post-pancreatectomy diabetes mellitus - partial islet graft function but highly variable insulin needs depending on health status, and elevated A1c today.  2.  Treated for central adrenal insufficiency.      Chantell is a 52 year old with history of chronic pancreatitis who is s/p total pancreatectomy and islet autotransplant, complicated by insulin resistance, and several episodes of severe hypoglycemia with partial islet graft function.  Historically has done much better on insulin pump than when she was on injections, so I would like her to continue on a pump.  She is due for a new pump model and is interested in 670g.  Because of her severe hypoglycemia I would highly support this. However, we discussed that she would not be able to into auto mode until she is covering carbs.    Made dose changes today but with caveat that she must cover her two main meals!  Chantell verbalized understanding.          Plan:  1.  Changes to current diabetes regimen:  Patient Instructions   1)  I want you to work on:  -- Getting 4 BG checks each day-- we may even be required to submit this documentation to get your new pump  -- Covering the carbs for the breakfast and the dinner.    2)  Anticipating covering the carbs, we made these changes  - reduced your basal rate from 0.7 to 0.6 unit per hour  - changed your I:C ratio from  30g to 40g.    3)  We talked about the 670g pump-- I think this is a good way to go, you would at least get the sensor with that which would be good for you with your severe hypoglycemia history and unawareness.  You are not ready for the closed loop pump mode yet, but if you start checking 4 times per day and covering those two meals, we may be able to get you into the closed loop mode (where the basal automatically adjusts to the sensor) .    4)  You will need diabetes education for the new pump.  I will talk to the educators out at [facility] to see if it is possible to set it up there.      Follow up:  Thursday June 21 at 9:20    Preventive Care:    Breast Cancer Screening: During our visit today, we discussed that it is recommended you receive breast cancer screening. Please call or make an appointment with your primary care provider to discuss this with them. You may also call the [facility] scheduling line ([phone]) to set up a mammography appointment at the [facility] within the [facility].            2.  Frequency of blood sugar checks:  Premeal, bedtime, and additional measurements PRN-- Should have strips sufficient to test 8 times per day given her labiltiy history.    3.  Continue routine follow up for autoislet transplant patients:  Mixed meal test (6 mL/kg BoostHP to max of 360 mL) at 3 months, 6 months, and once a year post transplant.  Hemoglobin A1c levels at these time points and quarterly.    4.  Other issues addressed today:  As above        Follow up:  3 months    Contact me for questions at [phone] or [phone].  Emergency number to reach pediatric endocrinology after hours is [phone].        Amena Maher MD  , Pediatric Endocrinology and Diabetes  [facility] [place]  [facility]      VISIT TIME:  25 minutes of face to face time, >50% spent in counseling and coordination of care    Amena CROW  MD [place]

## 2018-03-01 NOTE — MR AVS SNAPSHOT
After Visit Summary   3/1/2018    Chantell Kidd    MRN: [id number]           Patient Information     Date Of Birth          1965        Visit Information        Provider Department      3/1/2018 9:20 AM Amena Maher MD [facility]        Care Instructions    1)  I want you to work on:  -- Getting 4 BG checks each day-- we may even be required to submit this documentation to get your new pump  -- Covering the carbs for the breakfast and the dinner.    2)  Anticipating covering the carbs, we made these changes  - reduced your basal rate from 0.7 to 0.6 unit per hour  - changed your I:C ratio from 30g to 40g.    3)  We talked about the 670g pump-- I think this is a good way to go, you would at least get the sensor with that which would be good for you with your severe hypoglycemia history and unawareness.  You are not ready for the closed loop pump mode yet, but if you start checking 4 times per day and covering those two meals, we may be able to get you into the closed loop mode (where the basal automatically adjusts to the sensor) .    4)  You will need diabetes education for the new pump.  I will talk to the educators out at [facility] to see if it is possible to set it up there.      Follow up:  Thursday June 21 at 9:20    Preventive Care:    Breast Cancer Screening: During our visit today, we discussed that it is recommended you receive breast cancer screening. Please call or make an appointment with your primary care provider to discuss this with them. You may also call the [facility] scheduling line ([phone]) to set up a mammography appointment at the Breast Center within the [facility].                Follow-ups after your visit        Who to contact     If you have questions or need follow up information about today's clinic visit or your schedule please contact [facility] directly at [phone].  Normal or  "non-critical lab and imaging results will be communicated to you by MyChart, letter or phone within 4 business days after the clinic has received the results. If you do not hear from us within 7 days, please contact the clinic through [employer] or phone. If you have a critical or abnormal lab result, we will notify you by phone as soon as possible.  Submit refill requests through [employer] or call your pharmacy and they will forward the refill request to us. Please allow 3 business days for your refill to be completed.          Additional Information About Your Visit        Renew[employer] Information     [employer] gives you secure access to your electronic health record. If you see a primary care provider, you can also send messages to your care team and make appointments. If you have questions, please call your primary care clinic.  If you do not have a primary care provider, please call [phone] and they will assist you.        Care EveryWhere ID     This is your Care EveryWhere ID. This could be used by other organizations to access your [facility] medical records  [id number]        Your Vitals Were     Pulse Temperature Respirations Height Pulse Oximetry BMI (Body Mass Index)    79 99.1  F (37.3  C) (Oral) 16 1.575 m (5' 2\") 96% 27.91 kg/m2       Blood Pressure from Last 3 Encounters:   03/01/18 109/73   02/01/18 143/80   12/13/17 112/75    Weight from Last 3 Encounters:   03/01/18 69.2 kg (152 lb 9.6 oz)   02/01/18 69.7 kg (153 lb 11.2 oz)   01/09/18 70.1 kg (154 lb 9.6 oz)              Today, you had the following     No orders found for display       Primary Care Provider Office Phone # Fax #    Ron Morgan [phone]       [address] [address]        Equal Access to Services     BRIDGETTE JOHNSON : Arslan Shoemaker, waurmilada luqedilson, qaybta kaalmada nissa, bambi flowers. So [facility] [phone].    ATENCIÓN: Si brennanla español, " tiene a webb disposición servicios gratuitos de asistencia lingüística. Karyn cabrera [phone].    We comply with applicable federal civil rights laws and [place] laws. We do not discriminate on the basis of race, color, national origin, age, disability, sex, sexual orientation, or gender identity.            Thank you!     Thank you for choosing [facility]  for your care. Our goal is always to provide you with excellent care. Hearing back from our patients is one way we can continue to improve our services. Please take a few minutes to complete the written survey that you may receive in the mail after your visit with us. Thank you!             Your Updated Medication List - Protect others around you: Learn how to safely use, store and throw away your medicines at www.disposemymeds.org.          This list is accurate as of 3/1/18  9:54 AM.  Always use your most recent med list.                   Brand Name Dispense Instructions for use Diagnosis    acetaminophen 500 MG Caps      Take 1,000 mg by mouth three times a day as needed.        alendronate 70 MG tablet    FOSAMAX    4 tablet    Take 1 tablet (70 mg) by mouth every 7 days On Sundays take first thing in the morning with plain water and remain upright for at least 30 minutes and until after first food of the day  Do not restart Fosamax (alendronate) until your difficulty swallowing has resolved and you have finished the entire course of fluconazole (Diflucan).    Osteoporosis       alum & mag hydroxide-simethicone 200-200-25 MG Chew chewable tablet    GELUSIL    100 tablet    CHEW AND SWALLOW 2 CHEWS EVERY 4 HOURS AS NEEDED FOR INDIGESTION    Abdominal pain       amylase-lipase-protease 53597 UNITS Cpep    CREON 12    2160 capsule    Take 6 capsules with meals and 3 with snacks.  This is up to 24 capsules per day.    Exocrine pancreatic insufficiency       aspirin 81 MG tablet      Take 81 mg by mouth daily.        blood glucose monitoring  lancets     102 each    by Lancet route. Use to test blood sugar daily or as directed.    Chronic abdominal pain       * blood glucose monitoring test strip    no brand specified    240 each    Use to test blood glucoses 6-8 times per day.    Post-pancreatectomy diabetes (H)       * blood glucose monitoring test strip    NOEMI CONTOUR NEXT    240 each    Use to test blood sugar 8 times daily.    Post-pancreatectomy diabetes (H)       BOOST HIGH PROTEIN Liqd      Also can use Ensure clear (available over the counter)    Pancreatic insufficiency       cyclobenzaprine 5 MG tablet    FLEXERIL    42 tablet    Take 1 tablet (5 mg) by mouth 3 times daily as needed for muscle spasms    Abdominal pain, generalized       diclofenac 1 % Gel topical gel    VOLTAREN     2 g Apply 2 g to skin four times a day as needed (to affected upper extremity joint(s)). Maximum 8g/day per joint, 16g/day total.        dicyclomine 10 MG capsule    BENTYL    40 capsule    TAKE ONE CAPSULE BY MOUTH EVERY 6 HOURS AS NEEDED    Abdominal pain, epigastric       docusate sodium 100 MG capsule    DOK    120 capsule    Take 1 capsule (100 mg) by mouth daily as needed for constipation    AP (abdominal pain)       dronabinol 2.5 MG capsule    MARINOL    56 capsule    Take 2 capsules (5 mg) by mouth 2 times daily as needed    Routine health maintenance       * DULoxetine 30 MG EC capsule    CYMBALTA    90 capsule    Take 1 capsule (30 mg) by mouth daily With 60mg capsule for total dose of 90mg    Major depressive disorder, recurrent episode, moderate (H), CIERRA (generalized anxiety disorder)       * DULoxetine 60 MG EC capsule    CYMBALTA    90 capsule    Take 1 capsule (60 mg) by mouth daily With 30mg capsule for total dose of 90mg    Major depressive disorder, recurrent episode, moderate (H), CIERRA (generalized anxiety disorder)       fluconazole 200 MG tablet    DIFLUCAN    15 tablet    Take 2 tabs the first day and 1 tab for the next 13 days         furosemide 20 MG tablet    LASIX    180 tablet    Take 1 tablet (20 mg) by mouth 2 times daily    Edema, unspecified type       glucagon 1 MG kit    GLUCAGON EMERGENCY    1 mg    Inject 1 mg into the muscle once for 1 dose    Hypoglycemia unawareness in type 1 diabetes mellitus (H), Type 1 diabetes mellitus with hypoglycemic coma (H)       hydrocortisone 10 MG tablet    CORTEF    60 tablet    Take 10 mg in the morning and 10 mg at bedtime. Watch for hypoglycemia recurrence.    Hypoglycemia, Adrenal insufficiency (H)       insulin aspart 100 UNITS/ML injection    NovoLOG VIAL    36 vial    As directed in pump    Type 1 diabetes mellitus with complications (H)       insulin pen needle 31G X 5 MM     2 Box    RX# [id number]  Pen Needle UC 31G UF IV Mini  Use 4-8 needles per day for insulin injections.    Chronic abdominal pain       levothyroxine 112 MCG tablet    SYNTHROID/LEVOTHROID    90 tablet    Take 1 tablet (112 mcg) by mouth daily    Hypothyroidism       linaclotide 145 MCG capsule    LINZESS    30 capsule    Take 1 capsule (145 mcg) by mouth every morning (before breakfast)    Constipation, unspecified constipation type, Chronic back pain, unspecified back location, unspecified back pain laterality, Physical deconditioning, Primary insomnia       metoclopramide 5 MG tablet    REGLAN    100 tablet    Take 2 tablets (10 mg) by mouth 3 times daily (before meals    Routine health maintenance       nystatin 342937 unit/mL Susp suspension    MYCOSTATIN    60 mL    Take 1 mL (100,000 Units) by mouth 4 times daily    Odynophagia       ondansetron 4 MG ODT tab    ZOFRAN-ODT    60 tablet    DISSOLVE ONE TABLET ON THE TONGUE EVERY 6 HOURS AS NEEDED FOR NAUSEA    Nausea       pantoprazole 40 MG EC tablet    PROTONIX    180 tablet    Take 1 tablet (40 mg) by mouth 2 times daily    H. pylori infection       polyethylene glycol Packet    MIRALAX/GLYCOLAX    14 each    Take 1 packet by mouth 2 times daily as needed for  constipation    Chronic constipation       potassium chloride SA 20 MEQ CR tablet    K-DUR/KLOR-CON M    90 tablet    Take 1 tablet (20 mEq) by mouth daily    Hypokalemia       pregabalin 150 MG capsule    LYRICA    90 capsule    Take 1 capsule (150 mg) by mouth 3 times daily    Chronic generalized abdominal pain       senna 8.6 MG tablet    SENNA LAX    90 tablet    Take 1-2 tablets by mouth daily as needed for constipation    Other constipation       sodium bicarbonate 650 MG tablet     135 tablet    Take one-half tablet (325 mg), via feeding tube every 4 hours.    Malnutrition (H)       SUMAtriptan 50 MG tablet    IMITREX    30 tablet    Take 1 tablet (50 mg) by mouth at onset of headache for migraine Take 1 Tab by mouth Once as needed for Migraine Headache. May repeat after two hours.  Maximum dose 200 mg/24 hours.    Migraine       topiramate 100 MG tablet    TOPAMAX    180 tablet    Take 1 tablet (100 mg) by mouth 2 times daily    Migraine, unspecified, not intractable, without status migrainosus       traZODone 100 MG tablet    DESYREL    100 tablet    Take 1-2 tablets (100-200 mg) by mouth At Bedtime *AN HOUR BEFORE*    Primary insomnia, Constipation, unspecified constipation type, Chronic back pain, unspecified back location, unspecified back pain laterality, Physical deconditioning       * Notice:  This list has 4 medication(s) that are the same as other medications prescribed for you. Read the directions carefully, and ask your doctor or other care provider to review them with you.

## 2018-04-07 DIAGNOSIS — G89.29 CHRONIC BACK PAIN, UNSPECIFIED BACK LOCATION, UNSPECIFIED BACK PAIN LATERALITY: ICD-10-CM

## 2018-04-07 DIAGNOSIS — F51.01 PRIMARY INSOMNIA: ICD-10-CM

## 2018-04-07 DIAGNOSIS — R53.81 PHYSICAL DECONDITIONING: ICD-10-CM

## 2018-04-07 DIAGNOSIS — R10.9 AP (ABDOMINAL PAIN): ICD-10-CM

## 2018-04-07 DIAGNOSIS — K59.09 OTHER CONSTIPATION: ICD-10-CM

## 2018-04-07 DIAGNOSIS — E87.6 HYPOKALEMIA: ICD-10-CM

## 2018-04-07 DIAGNOSIS — R11.0 NAUSEA: ICD-10-CM

## 2018-04-07 DIAGNOSIS — K59.00 CONSTIPATION, UNSPECIFIED CONSTIPATION TYPE: ICD-10-CM

## 2018-04-07 DIAGNOSIS — M54.9 CHRONIC BACK PAIN, UNSPECIFIED BACK LOCATION, UNSPECIFIED BACK PAIN LATERALITY: ICD-10-CM

## 2018-04-09 RX ORDER — POTASSIUM CHLORIDE 1500 MG/1
20 TABLET, EXTENDED RELEASE ORAL DAILY
Qty: 90 TABLET | Refills: 1 | Status: ON HOLD | OUTPATIENT
Start: 2018-04-09 | End: 2020-11-12

## 2018-04-09 RX ORDER — DOCUSATE SODIUM 100 MG/1
100 CAPSULE, LIQUID FILLED ORAL DAILY PRN
Qty: 90 CAPSULE | Refills: 1 | Status: ON HOLD | OUTPATIENT
Start: 2018-04-09 | End: 2018-08-27

## 2018-04-09 RX ORDER — ONDANSETRON 4 MG/1
TABLET, ORALLY DISINTEGRATING ORAL
Qty: 60 TABLET | Refills: 2 | Status: SHIPPED | OUTPATIENT
Start: 2018-04-09

## 2018-04-09 RX ORDER — SENNOSIDES A AND B 8.6 MG/1
TABLET, FILM COATED ORAL
Qty: 90 TABLET | Refills: 1 | Status: ON HOLD | OUTPATIENT
Start: 2018-04-09 | End: 2018-08-27

## 2018-04-09 RX ORDER — TRAZODONE HYDROCHLORIDE 100 MG/1
TABLET ORAL
Qty: 100 TABLET | Refills: 1 | Status: SHIPPED | OUTPATIENT
Start: 2018-04-09 | End: 2018-10-18

## 2018-04-10 DIAGNOSIS — M54.9 CHRONIC BACK PAIN, UNSPECIFIED BACK LOCATION, UNSPECIFIED BACK PAIN LATERALITY: ICD-10-CM

## 2018-04-10 DIAGNOSIS — E03.9 HYPOTHYROIDISM: ICD-10-CM

## 2018-04-10 DIAGNOSIS — R53.81 PHYSICAL DECONDITIONING: ICD-10-CM

## 2018-04-10 DIAGNOSIS — G89.29 CHRONIC BACK PAIN, UNSPECIFIED BACK LOCATION, UNSPECIFIED BACK PAIN LATERALITY: ICD-10-CM

## 2018-04-10 DIAGNOSIS — G43.909 MIGRAINE, UNSPECIFIED, NOT INTRACTABLE, WITHOUT STATUS MIGRAINOSUS: ICD-10-CM

## 2018-04-10 DIAGNOSIS — K59.00 CONSTIPATION, UNSPECIFIED CONSTIPATION TYPE: ICD-10-CM

## 2018-04-10 DIAGNOSIS — F51.01 PRIMARY INSOMNIA: ICD-10-CM

## 2018-04-11 DIAGNOSIS — Z53.9 ERRONEOUS ENCOUNTER--DISREGARD: Primary | ICD-10-CM

## 2018-04-11 RX ORDER — TOPIRAMATE 100 MG/1
100 TABLET, FILM COATED ORAL 2 TIMES DAILY
Qty: 180 TABLET | Refills: 1 | Status: SHIPPED | OUTPATIENT
Start: 2018-04-11

## 2018-04-11 NOTE — TELEPHONE ENCOUNTER
Last Clinic Visit: 11/24/2017  [facility]  Reviewed problem list: Migraine dx

## 2018-04-11 NOTE — TELEPHONE ENCOUNTER
levothyroxine (SYNTHROID/LEVOTHROID) 112 MCG tablet  Last Written Prescription Date:  1/10/17  Last Fill Quantity: 90,   # refills: 3  Last Office Visit : 11/24/17  Future Office visit:  None      linaclotide (LINZESS) 145 MCG capsule    Last Written Prescription Date:  7/3/17  Last Fill Quantity: 30   # refills: 1      Routing  Because: levothyroxine TSH past due. linzess,not on protocol

## 2018-04-13 RX ORDER — LEVOTHYROXINE SODIUM 112 UG/1
112 TABLET ORAL DAILY
Qty: 30 TABLET | Refills: 0 | Status: SHIPPED | OUTPATIENT
Start: 2018-04-13 | End: 2018-10-23

## 2018-04-17 DIAGNOSIS — Z00.00 ROUTINE HEALTH MAINTENANCE: ICD-10-CM

## 2018-04-17 RX ORDER — DRONABINOL 2.5 MG/1
5 CAPSULE ORAL 2 TIMES DAILY PRN
Qty: 56 CAPSULE | Refills: 0 | Status: SHIPPED | OUTPATIENT
Start: 2018-04-17 | End: 2018-09-17

## 2018-04-17 NOTE — TELEPHONE ENCOUNTER
Last office visit with PCP 12/20/2016. Last office visit with another provider 11/24/17. Pt has upcoming appointment on 4/30/18. Last refill received 10/31/17. [facility] site verified 4/17/18.    Jahaira Mi RN

## 2018-05-07 ENCOUNTER — VIRTUAL VISIT (OUTPATIENT)
Dept: TRANSPLANT | Facility: CLINIC | Age: 53
End: 2018-05-07

## 2018-05-07 ENCOUNTER — MEDICAL CORRESPONDENCE (OUTPATIENT)
Dept: HEALTH INFORMATION MANAGEMENT | Facility: CLINIC | Age: 53
End: 2018-05-07

## 2018-05-07 DIAGNOSIS — Z90.410 POST-PANCREATECTOMY DIABETES (H): Primary | ICD-10-CM

## 2018-05-07 DIAGNOSIS — E13.9 POST-PANCREATECTOMY DIABETES (H): Primary | ICD-10-CM

## 2018-05-07 DIAGNOSIS — E89.1 POST-PANCREATECTOMY DIABETES (H): Primary | ICD-10-CM

## 2018-05-07 NOTE — PROGRESS NOTES
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     Post-pancreatectomy diabetes (H)     Type 1 diabetes mellitus (H)     Migraine     Abdominal muscle strain     CIERRA (generalized anxiety disorder)     Major depressive disorder, recurrent episode, moderate (H)     CMC DJD(carpometacarpal degenerative joint disease), localized primary     Exocrine pancreatic insufficiency     Hypothyroidism     Hypoglycemia     Mood disorder due to a general medical condition     Decreased oral intake     Odynophagia     Iron deficiency     Anemia, iron deficiency     Adrenal insufficiency (H)     S/P hernia repair     Nausea       HPI:  Chantell is a 52 year old female here for follow up of total pancreatectomy and islet autotransplant performed on January 6, 2012.  At the time of the procedure, the patient received 420,000 IEQ, or 5,438 IEQ/kg body weight.  She did not have diabetes before the procedure.  Early post-operative course was complicated by RLQ with appendicitis, eventually required appendectomy.    Despite the high islet mass transplanted, Chantell's post-operative course was initially remarkable for high insulin needs.  She in fact does have islet function, as previously documented by mixed meal tests, but she was insulin resistant, requiring high doses of insulin when on MDI therapy.  She also had frequent day to day variability, and fluctuating patterns with illness and healthier times, as well as a complex regimen, all of which make her an excellent candidate for an insulin pump which she started in Feb 2014.  Extremely concerning was an episode of severe hypoglycemia in November 2013 at which  time she was found unconscious.  Suspect at that time she was on too much basal insulin and because she was ill and not eating at the time, dropped far too low overnight.   In early 2014, she was started on an insulin pump with CGM.  Remarkably, her insulin needs have dropped dramatically on an insulin pump.  She was then relatively stable until 2016.  She was again admitted to the hospital on March 15 and March 29, 2016 for hypoglycemia, that appears to be secondary to both exogenous insulin and possible central adrenal insufficiency.   At that time she had the following lab findings:  On 3/29/16, elevated insulin with low C-peptide during BG 42 mg/dL around 5pm, and then again same pattern with BG 50s at 10pm.  Chantell is pretty clear that she did not take insuiln that day on 3/29/16. She also had three cortisol levels-- including one during hypoglycemia, one at around 4am (possibly also during hypoglycemia) and one 8am draw that were all low-- all 0.6- 1.6 range.    Of note, she did have a kenalog injection one week earlier on 3/24/16, just a few days prior to that draw and was also receiving narcotics in hospital (but not at home).  She has since had another severe hypogylcemic episode in Jan 2017 -- did not lose consciousness but was disoriented and unable to get help for herself at the store.  And again was admitted for severe hypoglycemia at [facility] on 11/29/2017 (refer to 12/13/17 note) with labs consistent with exogenous insulin overdose although she denied taking any excess insulin (12/1 C-peptide <0.1 and insulin .64.7, 11/30 C-peptide 0.5 and insulin 91).    Picture is also complicated by non-diabetes history which includes the following :  - 7/6/2016 EGD identified diffuse candidiasis through entire esophagous.  Pt has also had recurrent oral thrush in 2016  - Recurrent chronic abdominal pain  - Recurrent vomiting of unclear etiology but G-T placed 10/2016 and converted to GJ 11/2016 with  symptomatic improvement  Unclear if she has any gastroparesis.  Suboptimal study in March 2016 showed 100% retention at 1 hour and then rapid emptying.    - Hernia repair performed by general surgery 9/15/16 (TPIAT team not involved).    - Chronic abdominal pain      New history today:  This is a virtual visit with Chantell to review BGs  1)  Diabetes:    Chantell remains on her insulin pump therapy.  She has not had any new severe hypoglycemia but has a history of significant hypoglycemia in the past. She has provided logs for this virtual visit.  She is checking BG 4-5 times every day, which is verified on review of logs. We had reduced her pump settings last visit for concerns about hypoglycemia.  However, her records from 4/24 - 5/7/18 now show that she is more often hyperglycemic and especially after meals.  Her BG is mostly 126-206 mg/dL range, but she has a number of excursions to 300s in the afternoon and evening too.      Review of systems:  Review Of Systems  Negative except as above    Past Medical and Surgical History:  Past Medical History:   Diagnosis Date     Chronic abdominal pain      Chronic pancreatitis (H)     S/P pancreatectomy     Depression with anxiety      Diabetes mellitus (H) 1/2012     Gastro-oesophageal reflux disease      Hypothyroidism 4/23/2015     Kidney stones      Low serum cortisol level (H)      Migraines      Other chronic pain     STOMACH     Other chronic pain     LUMBAR SPINE     Spasm of sphincter of Oddi      Past Surgical History:   Procedure Laterality Date     ARTHROPLASTY CARPOMETACARPAL (THUMB JOINT)  5/2/2014    Procedure: ARTHROPLASTY CARPOMETACARPAL (THUMB JOINT);  Surgeon: Carina Panda MD;  Location: [facility] OR     CHOLECYSTECTOMY  2004     COLONOSCOPY  7/18/2014    Procedure: COLONOSCOPY;  Surgeon: Aurora Sahu MD;  Location:  GI     COLONOSCOPY N/A 8/1/2017    Procedure: COLONOSCOPY;  Colonoscopy and upper endoscopy;  Surgeon: Deirdre Harris  MD Steff;  Location: [facility]     ENDOSCOPIC RETROGRADE CHOLANGIOPANCREATOGRAM       ENDOSCOPIC RETROGRADE CHOLANGIOPANCREATOGRAM  4/19/2011    Procedure:ENDOSCOPIC RETROGRADE CHOLANGIOPANCREATOGRAM; Pancreatic Stent Placement       ENDOSCOPIC RETROGRADE CHOLANGIOPANCREATOGRAM  5/26/2011    Procedure:ENDOSCOPIC RETROGRADE CHOLANGIOPANCREATOGRAM; with Pancreatic Stent Removal; Surgeon:DALE MIMS; Location:[facility]     ENDOSCOPY UPPER, COLONOSCOPY, COMBINED  4/25/2012    Procedure:COMBINED ENDOSCOPY UPPER, COLONOSCOPY; Enteroscopy with Bile Duct Stent Removal, Colonoscopy  *[facility] Safe Room*; Surgeon:GRACY GODWINIQ; Location:[facility]     ESOPHAGOSCOPY, GASTROSCOPY, DUODENOSCOPY (EGD), COMBINED  5/26/2011    Procedure:COMBINED ESOPHAGOSCOPY, GASTROSCOPY, DUODENOSCOPY (EGD); Surgeon:DALE MIMS; Location[facility]     ESOPHAGOSCOPY, GASTROSCOPY, DUODENOSCOPY (EGD), COMBINED N/A 10/30/2014    Procedure: COMBINED ESOPHAGOSCOPY, GASTROSCOPY, DUODENOSCOPY (EGD), BIOPSY SINGLE OR MULTIPLE;  Surgeon: Sarai Moon MD;  Location: [facility]     ESOPHAGOSCOPY, GASTROSCOPY, DUODENOSCOPY (EGD), COMBINED Left 7/6/2015    Procedure: COMBINED ESOPHAGOSCOPY, GASTROSCOPY, DUODENOSCOPY (EGD), BIOPSY SINGLE OR MULTIPLE;  Surgeon: Thomas Estrada MD;  Location: [facility]     ESOPHAGOSCOPY, GASTROSCOPY, DUODENOSCOPY (EGD), COMBINED N/A 7/8/2016    Procedure: COMBINED ESOPHAGOSCOPY, GASTROSCOPY, DUODENOSCOPY (EGD), BIOPSY SINGLE OR MULTIPLE;  Surgeon: Eloy Klein MD;  Location: [facility]     ESOPHAGOSCOPY, GASTROSCOPY, DUODENOSCOPY (EGD), COMBINED N/A 8/4/2016    Procedure: COMBINED ESOPHAGOSCOPY, GASTROSCOPY, DUODENOSCOPY (EGD), BIOPSY SINGLE OR MULTIPLE;  Surgeon: Jason Brown MD;  [facility]: [facility]     ESOPHAGOSCOPY, GASTROSCOPY, DUODENOSCOPY (EGD), COMBINED N/A 8/1/2017    Procedure: COMBINED ESOPHAGOSCOPY, GASTROSCOPY, DUODENOSCOPY (EGD);;  Surgeon: Deirdre Harris MD;   Location: [facility]     GYN SURGERY      Hysterectomy and USO     [facility] ENDOSCOPY W EUS  7/20/2011    Procedure:COMBINED ENDOSCOPIC ULTRASOUND, ESOPHAGOSCOPY, GASTROSCOPY, DUODENOSCOPY (EGD); Surgeon:DARVIN DONOHUE; Location[facility]     HERNIORRHAPHY VENTRAL N/A 9/15/2016    Procedure: HERNIORRHAPHY VENTRAL;  Surgeon: Juanita Bernabe MD;  [facility]     HYSTERECTOMY  1997 or 1998    USO     INCISION AND DRAINAGE ABDOMEN WASHOUT, COMBINED  8/16/2012    Procedure: COMBINED INCISION AND DRAINAGE ABDOMEN WASHOUT;  ,debridement and Drainage Post Appendectomy;  Surgeon: Ron Austin MD;  [facility]     INJECT TRANSVERSUS ABDOMINIS PLANE (TAP) BLOCK BILATERAL Bilateral 5/26/2016    Procedure: INJECT TRANSVERSUS ABDOMINIS PLANE (TAP) BLOCK BILATERAL;  Surgeon: Leonard Mccallum MD;  [facility]     LAPAROSCOPIC APPENDECTOMY  7/30/2012    Procedure: LAPAROSCOPIC APPENDECTOMY;  Open Appendectomy;  Surgeon: Ron Austin MD;  [facility]     PANCREATECTOMY, TRANSPLANT AUTO ISLET CELL, COMBINED  1/6/2012    Procedure:COMBINED PANCREATECTOMY, TRANSPLANT AUTO ISLET CELL; Total  Pancreatectomy, Auto Islet Transplant, splenectomy, 18fr. transgastric-jejunal feeding tube placement, liver biopsy; Surgeon:PALAK LEE; [facility]     REPLACE GASTROSTOMY TUBE, PERCUTANEOUS N/A 8/30/2017    Procedure: REPLACE GASTROSTOMY TUBE, PERCUTANEOUS;  GJ Tube Change;  Surgeon: Jose Nath PA-C;  [facility]     SPLENECTOMY         Family History:  New changes since last visit:  none  Family History   Problem Relation Age of Onset     Hypertension Mother      DIABETES Mother      OSTEOPOROSIS Mother      CANCER Father      pancreatic cancer     DIABETES Maternal Grandmother      Cardiovascular Maternal Grandmother      CANCER Maternal Grandfather      lung cancer     CANCER Sister      brain     CANCER Sister      liver cancer       Social History:  Social History     Social  History Narrative     Unchanged. Lives in [place] with her .  Has 5 grandchildren       Physical Exam:  N/A    Results:  Reviewed previous        Assessment:  1. 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    Reviewing most recently logs, Chantell is testing 4 times each day and sometimes 5-6 times each day, never less than this.  Based on these readings, there are clearly glucose excursions occurring during the day, and she is back to low to mid 100s by AM.        Plan:  1.  Recommend go back to 1 unit per 30g of carb coverage.    Based on my review of her frequent BG readings, her current pump setting are:  Basal rate = 0.60 unit/hour (no change)  Bolus settings =  I:C ratio 12am 30g (changed today); ISF 50 mg/dL (no change); target  (no change)  2.  Should continue on pump therapy due ability to suspend pump in emergencies given history of life threatening episodes.  3.  Is pursing Jass which I think will be excellent for her as it will allow her to monitor more frequently even than is doing currently, given the hypoglycemia history.  Needs to test a minimum of 4-6 times per day, but would benefit from even more monitoring (6-8 times per day) and can do this with a jass flash monitor.      Follow up:  Has face to face follow up scheduled for June 21.    Contact me for questions at [phone] or [phone].  Emergency number to reach pediatric endocrinology after hours is [phone].        Amena Maher MD  , Pediatric Endocrinology and Diabetes  [facility]  [facility]      VISIT  TIME:  25 minutes of face to face time, >50% spent in counseling and coordination of care

## 2018-05-08 ENCOUNTER — TELEPHONE (OUTPATIENT)
Dept: TRANSPLANT | Facility: CLINIC | Age: 53
End: 2018-05-08

## 2018-05-08 NOTE — TELEPHONE ENCOUNTER
Per virtual note from Dr Maher dated 5/7/18, I called Chantell and advised her to change her carb ratio to 1 unit per 30 grms. She will make this change and let me know if her bs run low or erratically.

## 2018-05-17 ENCOUNTER — OFFICE VISIT (OUTPATIENT)
Dept: TRANSPLANT | Facility: CLINIC | Age: 53
End: 2018-05-17
Attending: PEDIATRICS
Payer: MEDICARE

## 2018-05-17 VITALS
TEMPERATURE: 98.6 F | HEART RATE: 75 BPM | OXYGEN SATURATION: 98 % | RESPIRATION RATE: 20 BRPM | HEIGHT: 62 IN | WEIGHT: 146.2 LBS | SYSTOLIC BLOOD PRESSURE: 123 MMHG | DIASTOLIC BLOOD PRESSURE: 72 MMHG | BODY MASS INDEX: 26.91 KG/M2

## 2018-05-17 DIAGNOSIS — E10.641 TYPE 1 DIABETES MELLITUS WITH HYPOGLYCEMIC COMA (H): Primary | ICD-10-CM

## 2018-05-17 PROCEDURE — G0463 HOSPITAL OUTPT CLINIC VISIT: HCPCS | Mod: ZF

## 2018-05-17 ASSESSMENT — PAIN SCALES - GENERAL: PAINLEVEL: MILD PAIN (2)

## 2018-05-17 NOTE — PATIENT INSTRUCTIONS
1)  New basal rates: 12am 0.50, 7am 0.60.    2)  You are underbolusing because you are worried about lows due to your severe hypoglycemia history.  So that you can feel safe taking your bolus, we are setting your bolus settings very low today:  I:C ratio 40g   mg/dL  Target  mg/dL    3)  I want you to follow the bolus wizard instructions for correcting for BGs and carbs.  If it is too much and you get low, let me know and we can back off further on the settings.    4)  With the rafia, you will need to manually plug BGs into the pump for correction dosing.  Please do this at least every 4-6 hours if you are running high.    5)  Keep scheduled visit in June.

## 2018-05-17 NOTE — MR AVS SNAPSHOT
After Visit Summary   5/17/2018    Chantell Kidd    MRN: [id number]           Patient Information     Date Of Birth          1965        Visit Information        Provider Department      5/17/2018 10:00 AM Amena Maher MD [facility] [place] [facility]        Today's Diagnoses     Type 1 diabetes mellitus with hypoglycemic coma (H)    -  1      Care Instructions    1)  New basal rates: 12am 0.50, 7am 0.60.    2)  You are underbolusing because you are worried about lows due to your severe hypoglycemia history.  So that you can feel safe taking your bolus, we are setting your bolus settings very low today:  I:C ratio 40g   mg/dL  Target  mg/dL    3)  I want you to follow the bolus wizard instructions for correcting for BGs and carbs.  If it is too much and you get low, let me know and we can back off further on the settings.    4)  With the rafia, you will need to manually plug BGs into the pump for correction dosing.  Please do this at least every 4-6 hours if you are running high.    5)  Keep scheduled visit in June.          Follow-ups after your visit        Your next 10 appointments already scheduled     Jun 21, 2018  9:20 AM CDT   (Arrive by 9:05 AM)   [facility] [place] with Amena Maher MD   [facility] [place] [facility] ([facility] [place])    [address] [place] [address]  [address] [place] [id number] [phone]              Who to contact     If you have questions or need follow up information about today's clinic visit or your schedule please contact [facility] directly at [phone].  Normal or non-critical lab and imaging results will be communicated to you by MyChart, letter or phone within 4 business days after the clinic has received the results. If you do not hear from us within 7 days, please contact the clinic through MyChart or phone. If you have a critical or abnormal lab result, we will notify you by phone as  "soon as possible.  Submit refill requests through [employer] or call your pharmacy and they will forward the refill request to us. Please allow 3 business days for your refill to be completed.          Additional Information About Your Visit        [employer] Information     [employer] gives you secure access to your electronic health record. If you see a primary care provider, you can also send messages to your care team and make appointments. If you have questions, please call your primary care clinic.  If you do not have a primary care provider, please call [phone] and they will assist you.        Care EveryWhere ID     This is your Care EveryWhere ID. This could be used by other organizations to access your [facility] medical records  [id number]        Your Vitals Were     Pulse Temperature Respirations Height Pulse Oximetry BMI (Body Mass Index)    75 98.6  F (37  C) (Oral) 20 1.575 m (5' 2\") 98% 26.74 kg/m2       Blood Pressure from Last 3 Encounters:   05/17/18 123/72   03/01/18 109/73   02/01/18 143/80    Weight from Last 3 Encounters:   05/17/18 66.3 kg (146 lb 3.2 oz)   03/01/18 69.2 kg (152 lb 9.6 oz)   02/01/18 69.7 kg (153 lb 11.2 oz)              Today, you had the following     No orders found for display       Primary Care Provider Office Phone # Fax #    Ron Kvng Morgan [phone]       [address] [place] [id number]        Equal Access to Services     NYDIA [facility] Hospital[facility]ONDA AH: Hadii aad ku hadasho Soomaali, waaxda luqadaha, qaybta kaalmada adeegyada, [employer] scout christianson . So [facility] [phone].    ATENCIÓN: Si habla español, tiene a webb disposición servicios gratuitos de asistencia lingüística. Llame al [phone].    We comply with applicable federal civil rights laws and [place] laws. We do not discriminate on the basis of race, color, national origin, age, disability, sex, sexual orientation, or gender identity.            Thank you!     Thank " you for choosing [facility]  for your care. Our goal is always to provide you with excellent care. Hearing back from our patients is one way we can continue to improve our services. Please take a few minutes to complete the written survey that you may receive in the mail after your visit with us. Thank you!             Your Updated Medication List - Protect others around you: Learn how to safely use, store and throw away your medicines at www.disposemymeds.org.          This list is accurate as of 5/17/18  6:03 PM.  Always use your most recent med list.                   Brand Name Dispense Instructions for use Diagnosis    acetaminophen 500 MG Caps      Take 1,000 mg by mouth three times a day as needed.        alendronate 70 MG tablet    FOSAMAX    4 tablet    Take 1 tablet (70 mg) by mouth every 7 days On Sundays take first thing in the morning with plain water and remain upright for at least 30 minutes and until after first food of the day  Do not restart Fosamax (alendronate) until your difficulty swallowing has resolved and you have finished the entire course of fluconazole (Diflucan).    Osteoporosis       alum & mag hydroxide-simethicone 200-200-25 MG Chew chewable tablet    GELUSIL    100 tablet    CHEW AND SWALLOW 2 CHEWS EVERY 4 HOURS AS NEEDED FOR INDIGESTION    Abdominal pain       amylase-lipase-protease 14331 units Cpep    CREON 12    2160 capsule    Take 6 capsules with meals and 3 with snacks.  This is up to 24 capsules per day.    Exocrine pancreatic insufficiency       aspirin 81 MG tablet      Take 81 mg by mouth daily.        blood glucose monitoring lancets     102 each    by Lancet route. Use to test blood sugar daily or as directed.    Chronic abdominal pain       * blood glucose monitoring test strip    no brand specified    240 each    Use to test blood glucoses 6-8 times per day.    Post-pancreatectomy diabetes (H)       * blood glucose monitoring test strip    NOEMI  CONTOUR NEXT    240 each    Use to test blood sugar 8 times daily.    Post-pancreatectomy diabetes (H)       BOOST HIGH PROTEIN Liqd      Also can use Ensure clear (available over the counter)    Pancreatic insufficiency       cyclobenzaprine 5 MG tablet    FLEXERIL    42 tablet    Take 1 tablet (5 mg) by mouth 3 times daily as needed for muscle spasms    Abdominal pain, generalized       diclofenac 1 % Gel topical gel    VOLTAREN     2 g Apply 2 g to skin four times a day as needed (to affected upper extremity joint(s)). Maximum 8g/day per joint, 16g/day total.        dicyclomine 10 MG capsule    BENTYL    40 capsule    TAKE ONE CAPSULE BY MOUTH EVERY 6 HOURS AS NEEDED    Abdominal pain, epigastric       docusate sodium 100 MG capsule    DOCQLACE    90 capsule    Take 1 capsule (100 mg) by mouth daily as needed for constipation    AP (abdominal pain)       dronabinol 2.5 MG capsule    MARINOL    56 capsule    Take 2 capsules (5 mg) by mouth 2 times daily as needed    Routine health maintenance       * DULoxetine 30 MG EC capsule    CYMBALTA    90 capsule    Take 1 capsule (30 mg) by mouth daily With 60mg capsule for total dose of 90mg    Major depressive disorder, recurrent episode, moderate (H), CIERRA (generalized anxiety disorder)       * DULoxetine 60 MG EC capsule    CYMBALTA    90 capsule    Take 1 capsule (60 mg) by mouth daily With 30mg capsule for total dose of 90mg    Major depressive disorder, recurrent episode, moderate (H), CIERRA (generalized anxiety disorder)       fluconazole 200 MG tablet    DIFLUCAN    15 tablet    Take 2 tabs the first day and 1 tab for the next 13 days        furosemide 20 MG tablet    LASIX    180 tablet    Take 1 tablet (20 mg) by mouth 2 times daily    Edema, unspecified type       glucagon 1 MG kit    GLUCAGON EMERGENCY    1 mg    Inject 1 mg into the muscle once for 1 dose    Hypoglycemia unawareness in type 1 diabetes mellitus (H), Type 1 diabetes mellitus with hypoglycemic coma  (H)       hydrocortisone 10 MG tablet    CORTEF    60 tablet    Take 10 mg in the morning and 10 mg at bedtime. Watch for hypoglycemia recurrence.    Hypoglycemia, Adrenal insufficiency (H)       insulin aspart 100 UNITS/ML injection    NovoLOG VIAL    36 vial    As directed in pump    Type 1 diabetes mellitus with complications (H)       insulin pen needle 31G X 5 MM     2 Box    RX# [id number]  Pen Needle UC 31G UF IV Mini  Use 4-8 needles per day for insulin injections.    Chronic abdominal pain       levothyroxine 112 MCG tablet    SYNTHROID/LEVOTHROID    30 tablet    Take 1 tablet (112 mcg) by mouth daily    Hypothyroidism       linaclotide 145 MCG capsule    LINZESS    30 capsule    Take 1 capsule (145 mcg) by mouth every morning (before breakfast)    Constipation, unspecified constipation type, Chronic back pain, unspecified back location, unspecified back pain laterality, Physical deconditioning, Primary insomnia       metoclopramide 5 MG tablet    REGLAN    100 tablet    Take 2 tablets (10 mg) by mouth 3 times daily (before meals    Routine health maintenance       nystatin 889287 unit/mL Susp suspension    MYCOSTATIN    60 mL    Take 1 mL (100,000 Units) by mouth 4 times daily    Odynophagia       ondansetron 4 MG ODT tab    ZOFRAN-ODT    60 tablet    DISSOLVE ONE TABLET ON THE TONGUE EVERY 6 HOURS AS NEEDED FOR NAUSEA AND VOMITING    Nausea       pantoprazole 40 MG EC tablet    PROTONIX    180 tablet    Take 1 tablet (40 mg) by mouth 2 times daily    H. pylori infection       polyethylene glycol Packet    MIRALAX/GLYCOLAX    14 each    Take 1 packet by mouth 2 times daily as needed for constipation    Chronic constipation       potassium chloride SA 20 MEQ CR tablet    K-DUR/KLOR-CON M    90 tablet    Take 1 tablet (20 mEq) by mouth daily    Hypokalemia       pregabalin 150 MG capsule    LYRICA    90 capsule    Take 1 capsule (150 mg) by mouth 3 times daily    Chronic generalized abdominal pain       * senna  8.6 MG tablet    SENNA LAX    90 tablet    Take 1-2 tablets by mouth daily as needed for constipation    Other constipation       * senna 8.6 MG tablet    SENNA LAX    90 tablet    TAKE 1-2 TABLETS BY MOUTH ONCE DAILY AS NEEDED FOR CONSTIPATION    Other constipation       sodium bicarbonate 650 MG tablet     135 tablet    Take one-half tablet (325 mg), via feeding tube every 4 hours.    Malnutrition (H)       SUMAtriptan 50 MG tablet    IMITREX    30 tablet    Take 1 tablet (50 mg) by mouth at onset of headache for migraine Take 1 Tab by mouth Once as needed for Migraine Headache. May repeat after two hours.  Maximum dose 200 mg/24 hours.    Migraine       topiramate 100 MG tablet    TOPAMAX    180 tablet    Take 1 tablet (100 mg) by mouth 2 times daily    Migraine, unspecified, not intractable, without status migrainosus       traZODone 100 MG tablet    DESYREL    100 tablet    TAKE 1-2 TABLETS BY MOUTH AN HOUR BEFORE BEDTIME    Primary insomnia, Constipation, unspecified constipation type, Chronic back pain, unspecified back location, unspecified back pain laterality, Physical deconditioning       * Notice:  This list has 6 medication(s) that are the same as other medications prescribed for you. Read the directions carefully, and ask your doctor or other care provider to review them with you.

## 2018-05-17 NOTE — PROGRESS NOTES
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     Post-pancreatectomy diabetes (H)     Type 1 diabetes mellitus (H)     Migraine     Abdominal muscle strain     CIERRA (generalized anxiety disorder)     Major depressive disorder, recurrent episode, moderate (H)     CMC DJD(carpometacarpal degenerative joint disease), localized primary     Exocrine pancreatic insufficiency     Hypothyroidism     Hypoglycemia     Mood disorder due to a general medical condition     Decreased oral intake     Odynophagia     Iron deficiency     Anemia, iron deficiency     Adrenal insufficiency (H)     S/P hernia repair     Nausea       HPI:  Chantell is a 52 year old female here for follow up of total pancreatectomy and islet autotransplant performed on January 6, 2012.  At the time of the procedure, the patient received 420,000 IEQ, or 5,438 IEQ/kg body weight.  She did not have diabetes before the procedure.  Early post-operative course was complicated by RLQ with appendicitis, eventually required appendectomy.    Despite the high islet mass transplanted, Chantell's post-operative course was initially remarkable for high insulin needs.  She in fact does have islet function, as previously documented by mixed meal tests, but she was insulin resistant, requiring high doses of insulin when on MDI therapy.  She also had frequent day to day variability, and fluctuating patterns with illness and healthier times, as well as a complex regimen, all of which make her an excellent candidate for an insulin pump which she started in Feb 2014.  Extremely concerning was an episode of severe hypoglycemia in November 2013 at which time  she was found unconscious.  Suspect at that time she was on too much basal insulin and because she was ill and not eating at the time, dropped far too low overnight.   In early 2014, she was started on an insulin pump with CGM.  Remarkably, her insulin needs have dropped dramatically on an insulin pump.  She was then relatively stable until 2016.  She was again admitted to the hospital on March 15 and March 29, 2016 for hypoglycemia, that appears to be secondary to both exogenous insulin and possible central adrenal insufficiency.   At that time she had the following lab findings:  On 3/29/16, elevated insulin with low C-peptide during BG 42 mg/dL around 5pm, and then again same pattern with BG 50s at 10pm.  Chantell is pretty clear that she did not take insuiln that day on 3/29/16. She also had three cortisol levels-- including one during hypoglycemia, one at around 4am (possibly also during hypoglycemia) and one 8am draw that were all low-- all 0.6- 1.6 range.    Of note, she did have a kenalog injection one week earlier on 3/24/16, just a few days prior to that draw and was also receiving narcotics in hospital (but not at home).  She has since had another severe hypogylcemic episode in Jan 2017 -- did not lose consciousness but was disoriented and unable to get help for herself at the store.  And again was admitted for severe hypoglycemia at [facility] on 11/29/2017 (refer to 12/13/17 note) with labs consistent with exogenous insulin overdose although she denied taking any excess insulin (12/1 C-peptide <0.1 and insulin .64.7, 11/30 C-peptide 0.5 and insulin 91).    Picture is also complicated by non-diabetes history which includes the following :  - 7/6/2016 EGD identified diffuse candidiasis through entire esophagous.  Pt has also had recurrent oral thrush in 2016  - Recurrent chronic abdominal pain  - Recurrent vomiting of unclear etiology but G-T placed 10/2016 and converted to GJ 11/2016 with symptomatic  improvement  Unclear if she has any gastroparesis.  Suboptimal study in March 2016 showed 100% retention at 1 hour and then rapid emptying.    - Hernia repair performed by general surgery 9/15/16 (TPIAT team not involved).    - Chronic abdominal pain      New history today:  1)  Diabetes:    Chantell is overall doing better but she continues to have labile diabetes with unpredictable hyper and hypoglycemia and hypoglycemia unawareness.  She is doing a great job of monitoring right now.  We reviewed her fingerstick BG records which showed 4 glucose checks each day, and some days up to 6.  These show most AM are at or near target, and then she is having more highs during the day.  But even yesterday she was 47 mg/dL in the morning with no symptoms.  Her  thought she was acting funny and told her she should check her BG.  She is working on [employer] for more frequent checking for lows.  She has a fear of hypoglycemia and therefore is reducing her bolus dosing on her pump.    Chantell is using an insulin pump for diabetes management because she requires frequent injections otherwise and does much better on a pump.  She is needing to communicate with us between visits for frequent dose adjustments on her pump due to lows and highs.    Chantell's pump settings are currently:  Basal 12am 0.60 u/hr  Bolus:  I:C ratios 12a 40g, ISF 80 mg/dL targets  mg/dL  She uses 15 unit/day on average -- likely needs more but underestimates what she needs due to hypoglyceami    Weight is down but is maintaining OK on her oral diet  Wt Readings from Last 4 Encounters:   05/17/18 66.3 kg (146 lb 3.2 oz)   03/01/18 69.2 kg (152 lb 9.6 oz)   02/01/18 69.7 kg (153 lb 11.2 oz)   01/09/18 70.1 kg (154 lb 9.6 oz)     Body mass index is 26.74 kg/(m^2).      Recent hemoglobin A1c levels:  Today's value = 6.3%    Lab Results   Component Value Date    A1C 7.5 02/01/2018    A1C 6.7 12/13/2017    A1C 6.4 10/19/2017    A1C 5.9 01/19/2017    A1C  6.8 11/16/2016     ]    2)  Adrenal insufficiency:  Still unclear if this was transient or true central AI but we have been treating her regardless due to SHE history.  Since our last visit, we have maintained her on 20 mg/day (10 BID) of cortef, with Body surface area is 1.7 meters squared.   We did not make changes and will address this at next visit    3)  Other   - some minimal abdominal pain.  She is concerned that she may have a new hernia at her GJ tube site as she has episodes that are very painful with a mass protuding there that she needs to self reduce.  No pain meds.        Review of systems:  Review Of Systems  Skin: negative  Eyes: negative  Ears/Nose/Throat: negative  Respiratory: No shortness of breath, dyspnea on exertion, cough, or hemoptysis  Cardiovascular: negative  Gastrointestinal: chronic abdominal pain + hernia  Genitourinary: negative  Musculoskeletal: negative  Neurologic: negative  Psychiatric: negative  Hematologic/Lymphatic/Immunologic: negative  Endocrine: as above    Past Medical and Surgical History:  Past Medical History:   Diagnosis Date     Chronic abdominal pain      Chronic pancreatitis (H)     S/P pancreatectomy     Depression with anxiety      Diabetes mellitus (H) 1/2012     Gastro-oesophageal reflux disease      Hypothyroidism 4/23/2015     Kidney stones      Low serum cortisol level (H)      Migraines      Other chronic pain     STOMACH     Other chronic pain     LUMBAR SPINE     Spasm of sphincter of Oddi      Past Surgical History:   Procedure Laterality Date     ARTHROPLASTY CARPOMETACARPAL (THUMB JOINT)  5/2/2014    Procedure: ARTHROPLASTY CARPOMETACARPAL (THUMB JOINT);  Surgeon: Carina Panda MD;  Location: [facility]     CHOLECYSTECTOMY  2004     COLONOSCOPY  7/18/2014    Procedure: COLONOSCOPY;  Surgeon: Aurora Sahu MD;  [facility]     COLONOSCOPY N/A 8/1/2017    Procedure: COLONOSCOPY;  Colonoscopy and upper endoscopy;  Surgeon: Steven  Deirdre Artis MD;  [facility]     ENDOSCOPIC RETROGRADE CHOLANGIOPANCREATOGRAM       ENDOSCOPIC RETROGRADE CHOLANGIOPANCREATOGRAM  4/19/2011    Procedure:ENDOSCOPIC RETROGRADE CHOLANGIOPANCREATOGRAM; Pancreatic Stent Placement       ENDOSCOPIC RETROGRADE CHOLANGIOPANCREATOGRAM  5/26/2011    Procedure:ENDOSCOPIC RETROGRADE CHOLANGIOPANCREATOGRAM; with Pancreatic Stent Removal; Surgeon:DALE MIMS; Location:[facility]     ENDOSCOPY UPPER, COLONOSCOPY, COMBINED  4/25/2012    Procedure:COMBINED ENDOSCOPY UPPER, COLONOSCOPY; Enteroscopy with Bile Duct Stent Removal, Colonoscopy  *Latex Safe Room*; Surgeon:GRACY GODWINIQ; [facility]     ESOPHAGOSCOPY, GASTROSCOPY, DUODENOSCOPY (EGD), COMBINED  5/26/2011    Procedure:COMBINED ESOPHAGOSCOPY, GASTROSCOPY, DUODENOSCOPY (EGD); Surgeon:DALE MIMS; [facility]     ESOPHAGOSCOPY, GASTROSCOPY, DUODENOSCOPY (EGD), COMBINED N/A 10/30/2014    Procedure: COMBINED ESOPHAGOSCOPY, GASTROSCOPY, DUODENOSCOPY (EGD), BIOPSY SINGLE OR MULTIPLE;  Surgeon: Sarai Moon MD;  [facility]     ESOPHAGOSCOPY, GASTROSCOPY, DUODENOSCOPY (EGD), COMBINED Left 7/6/2015    Procedure: COMBINED ESOPHAGOSCOPY, GASTROSCOPY, DUODENOSCOPY (EGD), BIOPSY SINGLE OR MULTIPLE;  Surgeon: Thomas Estrada MD;  [facility]     ESOPHAGOSCOPY, GASTROSCOPY, DUODENOSCOPY (EGD), COMBINED N/A 7/8/2016    Procedure: COMBINED ESOPHAGOSCOPY, GASTROSCOPY, DUODENOSCOPY (EGD), BIOPSY SINGLE OR MULTIPLE;  Surgeon: Eloy Klein MD;  [facility]     ESOPHAGOSCOPY, GASTROSCOPY, DUODENOSCOPY (EGD), COMBINED N/A 8/4/2016    Procedure: COMBINED ESOPHAGOSCOPY, GASTROSCOPY, DUODENOSCOPY (EGD), BIOPSY SINGLE OR MULTIPLE;  Surgeon: Jason Brown MD;  [facility]     ESOPHAGOSCOPY, GASTROSCOPY, DUODENOSCOPY (EGD), COMBINED N/A 8/1/2017    Procedure: COMBINED ESOPHAGOSCOPY, GASTROSCOPY, DUODENOSCOPY (EGD);;  Surgeon: Deirdre Harris,  MD;  [facility]     GYN SURGERY      Hysterectomy and USO     [facility] ENDOSCOPY W EUS  7/20/2011    Procedure:COMBINED ENDOSCOPIC ULTRASOUND, ESOPHAGOSCOPY, GASTROSCOPY, DUODENOSCOPY (EGD); Surgeon:DARVIN DONOHUE; [facility]     HERNIORRHAPHY VENTRAL N/A 9/15/2016    Procedure: HERNIORRHAPHY VENTRAL;  Surgeon: Juanita Bernabe MD;  [facility]     HYSTERECTOMY  1997 or 1998    USO     INCISION AND DRAINAGE ABDOMEN WASHOUT, COMBINED  8/16/2012    Procedure: COMBINED INCISION AND DRAINAGE ABDOMEN WASHOUT;  ,debridement and Drainage Post Appendectomy;  Surgeon: Ron Austin MD;  [facility]     INJECT TRANSVERSUS ABDOMINIS PLANE (TAP) BLOCK BILATERAL Bilateral 5/26/2016    Procedure: INJECT TRANSVERSUS ABDOMINIS PLANE (TAP) BLOCK BILATERAL;  Surgeon: Leonard Mccallum MD;  [facility]     LAPAROSCOPIC APPENDECTOMY  7/30/2012    Procedure: LAPAROSCOPIC APPENDECTOMY;  Open Appendectomy;  Surgeon: Ron Austin MD;  [facility]     PANCREATECTOMY, TRANSPLANT AUTO ISLET CELL, COMBINED  1/6/2012    Procedure:COMBINED PANCREATECTOMY, TRANSPLANT AUTO ISLET CELL; Total  Pancreatectomy, Auto Islet Transplant, splenectomy, 18fr. transgastric-jejunal feeding tube placement, liver biopsy; Surgeon:PALAK LEE; [facility]     REPLACE GASTROSTOMY TUBE, PERCUTANEOUS N/A 8/30/2017    Procedure: REPLACE GASTROSTOMY TUBE, PERCUTANEOUS;  GJ Tube Change;  Surgeon: Jose Nath PA-C;  [facility]     SPLENECTOMY         Family History:  New changes since last visit:  none  Family History   Problem Relation Age of Onset     Hypertension Mother      DIABETES Mother      OSTEOPOROSIS Mother      CANCER Father      pancreatic cancer     DIABETES Maternal Grandmother      Cardiovascular Maternal Grandmother      CANCER Maternal Grandfather      lung cancer     CANCER Sister      brain     CANCER Sister      liver cancer       Social History:  Social History     Social  "History Narrative     Unchanged. Lives in [place] with her .  Has 5 grandchildren       Physical Exam:  Vitals: /72 (Patient Position: Sitting)  Pulse 75  Temp 98.6  F (37  C) (Oral)  Resp 20  Ht 1.575 m (5' 2\")  Wt 66.3 kg (146 lb 3.2 oz)  SpO2 98%  BMI 26.74 kg/m2  BMI= Body mass index is 26.74 kg/(m^2).     General:  Well-appearing, NAD  Head: NC/AT  Eyes: sclera white  Abdomen:  Small possible hernia at left upper quadrant  Neuro:  Normal mental status, normal gross motor  Psych:  Communicative, with normal affect     Results:  A1c 6.3%  Lab Results   Component Value Date    A1C 7.5 02/01/2018    A1C 6.7 12/13/2017    A1C 6.4 10/19/2017    A1C 5.9 01/19/2017    A1C 6.8 11/16/2016       C-[id number]/1/2017  [facility]   Component Name Value Ref Range   C-PEPTIDE  <0.1 (L)   Comment:    Test Performed by:  [facility]  [address] [place] [address] 1.1 - 4.4 ng/mL    Specimen   Blood     Simultaneous glucose 12/1/17 = 81 mg/dL      Assessment:  1. 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  Treated for central adrenal insufficiency.      Chantell is a 52 year old with history of chronic pancreatitis who is s/p total pancreatectomy and islet autotransplant, with insulin dependence (pump therapy) complicated by insulin resistance, and several episodes of severe hypoglycemia and seizures (E10.641).  She is treated with a [employer] covered continuous subcutaneous insulin infusion pump.    Chantell has post-pancreatectomy diabetes-- essentially a surgical form of type 1 diabetes (E10.641).  She requires an insulin pump for management due to her multiple episodes of hypoglycemia and hypoglycemia unawareness, including episodes of seizures that required suspension of insulin pump and hospital admits. She also should have a continuous glucose monitor for frequent monitoring because of her severe " hypoglycemia history.  Chantell is measuring her BG 4-6 times every single day, as we documented in the clinic encounter today from review of her logs and her meter download.    Chantell requires frequent insulin adjustments to her insulin regimen based on her blood glucoses to control hyperglycemia and hypoglycemia.  Currently we are making these changes based on 4 BG checks per day, and we could make more informed adjustments with access to a CGM.  We made adjustments again today to her pump settings based on fingerstick BG readings.  These are indicated below.    Other issues today=  May have hernia (small) that is recurrently causing issues so will discuss with coordinators if should be seen in surgical clinic.  Surgery history is complicated as she has had several surgeons in past.      Plan:  1.  Changes to current diabetes regimen:  Patient Instructions   1)  New basal rates: 12am 0.50, 7am 0.60.    2)  You are underbolusing because you are worried about lows due to your severe hypoglycemia history.  So that you can feel safe taking your bolus, we are setting your bolus settings very low today:  I:C ratio 40g   mg/dL  Target  mg/dL    3)  I want you to follow the bolus wizard instructions for correcting for BGs and carbs.  If it is too much and you get low, let me know and we can back off further on the settings.    4)  With the rafia, you will need to manually plug BGs into the pump for correction dosing.  Please do this at least every 4-6 hours if you are running high.    5)  Keep scheduled visit in June.      2.  Frequency of blood sugar checks:  Premeal, bedtime, and additional measurements PRN-- Should have strips sufficient to test 8 times per day given her labiltiy history.    3.  Continue routine follow up for autoislet transplant patients:  Mixed meal test (6 mL/kg BoostHP to max of 360 mL) at 3 months, 6 months, and once a year post transplant.  Hemoglobin A1c levels at these time points and  quarterly.    4.  Other issues addressed today:  As above        Follow up:  1 months    Contact me for questions at [phone] or [phone].  Emergency number to reach pediatric endocrinology after hours is [phone].        Amena Maher MD  , Pediatric Endocrinology and Diabetes  [facility] [place]  [facility]      VISIT TIME:  25 minutes of face to face time, >50% spent in counseling and coordination of care

## 2018-05-17 NOTE — LETTER
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     Post-pancreatectomy diabetes (H)     Type 1 diabetes mellitus (H)     Migraine     Abdominal muscle strain     CIERRA (generalized anxiety disorder)     Major depressive disorder, recurrent episode, moderate (H)     CMC DJD(carpometacarpal degenerative joint disease), localized primary     Exocrine pancreatic insufficiency     Hypothyroidism     Hypoglycemia     Mood disorder due to a general medical condition     Decreased oral intake     Odynophagia     Iron deficiency     Anemia, iron deficiency     Adrenal insufficiency (H)     S/P hernia repair     Nausea       HPI:  Chantell is a 52 year old female here for follow up of total pancreatectomy and islet autotransplant performed on January 6, 2012.  At the time of the procedure, the patient received 420,000 IEQ, or 5,438 IEQ/kg body weight.  She did not have diabetes before the procedure.  Early post-operative course was complicated by RLQ with appendicitis, eventually required appendectomy.    Despite the high islet mass transplanted, Chantell's post-operative course was initially remarkable for high insulin needs.  She in fact does have islet function, as previously documented by mixed meal tests, but she was insulin resistant, requiring high doses of insulin when on MDI therapy.  She also had frequent day to day variability, and fluctuating patterns with illness and healthier times, as well as a complex regimen, all of which make her an excellent candidate for an insulin pump which she started in Feb 2014.   Extremely concerning was an episode of severe hypoglycemia in November 2013 at which time she was found unconscious.  Suspect at that time she was on too much basal insulin and because she was ill and not eating at the time, dropped far too low overnight.   In early 2014, she was started on an insulin pump with CGM.  Remarkably, her insulin needs have dropped dramatically on an insulin pump.  She was then relatively stable until 2016.  She was again admitted to the hospital on March 15 and March 29, 2016 for hypoglycemia, that appears to be secondary to both exogenous insulin and possible central adrenal insufficiency.   At that time she had the following lab findings:  On 3/29/16, elevated insulin with low C-peptide during BG 42 mg/dL around 5pm, and then again same pattern with BG 50s at 10pm.  Chantell is pretty clear that she did not take insuiln that day on 3/29/16. She also had three cortisol levels-- including one during hypoglycemia, one at around 4am (possibly also during hypoglycemia) and one 8am draw that were all low-- all 0.6- 1.6 range.    Of note, she did have a kenalog injection one week earlier on 3/24/16, just a few days prior to that draw and was also receiving narcotics in hospital (but not at home).  She has since had another severe hypogylcemic episode in Jan 2017 -- did not lose consciousness but was disoriented and unable to get help for herself at the store.  And again was admitted for severe hypoglycemia at [facility] on 11/29/2017 (refer to 12/13/17 note) with labs consistent with exogenous insulin overdose although she denied taking any excess insulin (12/1 C-peptide <0.1 and insulin .64.7, 11/30 C-peptide 0.5 and insulin 91).    Picture is also complicated by non-diabetes history which includes the following :  - 7/6/2016 EGD identified diffuse candidiasis through entire esophagous.  Pt has also had recurrent oral thrush in 2016  - Recurrent chronic abdominal pain  - Recurrent vomiting  of unclear etiology but G-T placed 10/2016 and converted to GJ 11/2016 with symptomatic improvement  Unclear if she has any gastroparesis.  Suboptimal study in March 2016 showed 100% retention at 1 hour and then rapid emptying.    - Hernia repair performed by general surgery 9/15/16 (TPIAT team not involved).    - Chronic abdominal pain      New history today:  1)  Diabetes:    Chantell is overall doing better but she continues to have labile diabetes with unpredictable hyper and hypoglycemia and hypoglycemia unawareness.  She is doing a great job of monitoring right now.  We reviewed her fingerstick BG records which showed 4 glucose checks each day, and some days up to 6.  These show most AM are at or near target, and then she is having more highs during the day.  But even yesterday she was 47 mg/dL in the morning with no symptoms.  Her  thought she was acting funny and told her she should check her BG.  She is working on [employer] the [email or web address] for more frequent checking for lows.  She has a fear of hypoglycemia and therefore is reducing her bolus dosing on her pump.    Chantell is using an insulin pump for diabetes management because she requires frequent injections otherwise and does much better on a pump.  She is needing to communicate with us between visits for frequent dose adjustments on her pump due to lows and highs.    Chantell's pump settings are currently:  Basal 12am 0.60 u/hr  Bolus:  I:C ratios 12a 40g, ISF 80 mg/dL targets  mg/dL  She uses 15 unit/day on average -- likely needs more but underestimates what she needs due to hypoglyceami    Weight is down but is maintaining OK on her oral diet  Wt Readings from Last 4 Encounters:   05/17/18 66.3 kg (146 lb 3.2 oz)   03/01/18 69.2 kg (152 lb 9.6 oz)   02/01/18 69.7 kg (153 lb 11.2 oz)   01/09/18 70.1 kg (154 lb 9.6 oz)     Body mass index is 26.74 kg/(m^2).      Recent hemoglobin A1c levels:  Today's value = 6.3%    Lab Results   Component Value Date    A1C  7.5 02/01/2018    A1C 6.7 12/13/2017    A1C 6.4 10/19/2017    A1C 5.9 01/19/2017    A1C 6.8 11/16/2016     ]    2)  Adrenal insufficiency:  Still unclear if this was transient or true central AI but we have been treating her regardless due to SHE history.  Since our last visit, we have maintained her on 20 mg/day (10 BID) of cortef, with Body surface area is 1.7 meters squared.   We did not make changes and will address this at next visit    3)  Other   - some minimal abdominal pain.  She is concerned that she may have a new hernia at her GJ tube site as she has episodes that are very painful with a mass protuding there that she needs to self reduce.  No pain meds.        Review of systems:  Review Of Systems  Skin: negative  Eyes: negative  Ears/Nose/Throat: negative  Respiratory: No shortness of breath, dyspnea on exertion, cough, or hemoptysis  Cardiovascular: negative  Gastrointestinal: chronic abdominal pain + hernia  Genitourinary: negative  Musculoskeletal: negative  Neurologic: negative  Psychiatric: negative  Hematologic/Lymphatic/Immunologic: negative  Endocrine: as above    Past Medical and Surgical History:  Past Medical History:   Diagnosis Date     Chronic abdominal pain      Chronic pancreatitis (H)     S/P pancreatectomy     Depression with anxiety      Diabetes mellitus (H) 1/2012     Gastro-oesophageal reflux disease      Hypothyroidism 4/23/2015     Kidney stones      Low serum cortisol level (H)      Migraines      Other chronic pain     STOMACH     Other chronic pain     LUMBAR SPINE     Spasm of sphincter of Oddi      Past Surgical History:   Procedure Laterality Date     ARTHROPLASTY CARPOMETACARPAL (THUMB JOINT)  5/2/2014    Procedure: ARTHROPLASTY CARPOMETACARPAL (THUMB JOINT);  Surgeon: Carina Panda MD;  Location: [facility]     CHOLECYSTECTOMY  2004     COLONOSCOPY  7/18/2014    Procedure: COLONOSCOPY;  Surgeon: Aurora Sahu MD;  Location: [facility]     COLONOSCOPY N/A  8/1/2017    Procedure: COLONOSCOPY;  Colonoscopy and upper endoscopy;  Surgeon: Deirdre Harris MD;  [facility]: [facility]     ENDOSCOPIC RETROGRADE CHOLANGIOPANCREATOGRAM       ENDOSCOPIC RETROGRADE CHOLANGIOPANCREATOGRAM  4/19/2011    Procedure:ENDOSCOPIC RETROGRADE CHOLANGIOPANCREATOGRAM; Pancreatic Stent Placement       ENDOSCOPIC RETROGRADE CHOLANGIOPANCREATOGRAM  5/26/2011    Procedure:ENDOSCOPIC RETROGRADE CHOLANGIOPANCREATOGRAM; with Pancreatic Stent Removal; Surgeon:DALE MIMS; [facility]     ENDOSCOPY UPPER, COLONOSCOPY, COMBINED  4/25/2012    Procedure:COMBINED ENDOSCOPY UPPER, COLONOSCOPY; Enteroscopy with Bile Duct Stent Removal, Colonoscopy  *Latex Safe Room*; Surgeon:GRACY GODWINIQ; [facility]     ESOPHAGOSCOPY, GASTROSCOPY, DUODENOSCOPY (EGD), COMBINED  5/26/2011    Procedure:COMBINED ESOPHAGOSCOPY, GASTROSCOPY, DUODENOSCOPY (EGD); Surgeon:DALE MIMS; [facility]     ESOPHAGOSCOPY, GASTROSCOPY, DUODENOSCOPY (EGD), COMBINED N/A 10/30/2014    Procedure: COMBINED ESOPHAGOSCOPY, GASTROSCOPY, DUODENOSCOPY (EGD), BIOPSY SINGLE OR MULTIPLE;  Surgeon: Sarai Moon MD;  [facility]     ESOPHAGOSCOPY, GASTROSCOPY, DUODENOSCOPY (EGD), COMBINED Left 7/6/2015    Procedure: COMBINED ESOPHAGOSCOPY, GASTROSCOPY, DUODENOSCOPY (EGD), BIOPSY SINGLE OR MULTIPLE;  Surgeon: Thomas Estrada MD;  [facility]     ESOPHAGOSCOPY, GASTROSCOPY, DUODENOSCOPY (EGD), COMBINED N/A 7/8/2016    Procedure: COMBINED ESOPHAGOSCOPY, GASTROSCOPY, DUODENOSCOPY (EGD), BIOPSY SINGLE OR MULTIPLE;  Surgeon: Eloy Klein MD;  [facility]     ESOPHAGOSCOPY, GASTROSCOPY, DUODENOSCOPY (EGD), COMBINED N/A 8/4/2016    Procedure: COMBINED ESOPHAGOSCOPY, GASTROSCOPY, DUODENOSCOPY (EGD), BIOPSY SINGLE OR MULTIPLE;  Surgeon: Jason Brown MD;  [facility]     ESOPHAGOSCOPY, GASTROSCOPY, DUODENOSCOPY (EGD), COMBINED N/A 8/1/2017    Procedure: COMBINED  ESOPHAGOSCOPY, GASTROSCOPY, DUODENOSCOPY (EGD);;  Surgeon: Deirdre Harris MD;  [facility]     GYN SURGERY      Hysterectomy and USO     [facility] ENDOSCOPY W EUS  7/20/2011    Procedure:COMBINED ENDOSCOPIC ULTRASOUND, ESOPHAGOSCOPY, GASTROSCOPY, DUODENOSCOPY (EGD); Surgeon:DARVIN DONOHUE; [facility]     HERNIORRHAPHY VENTRAL N/A 9/15/2016    Procedure: HERNIORRHAPHY VENTRAL;  Surgeon: Juanita Bernabe MD;  [facility]     HYSTERECTOMY  1997 or 1998    USO     INCISION AND DRAINAGE ABDOMEN WASHOUT, COMBINED  8/16/2012    Procedure: COMBINED INCISION AND DRAINAGE ABDOMEN WASHOUT;  ,debridement and Drainage Post Appendectomy;  Surgeon: Ron Austin MD;  [facility]     INJECT TRANSVERSUS ABDOMINIS PLANE (TAP) BLOCK BILATERAL Bilateral 5/26/2016    Procedure: INJECT TRANSVERSUS ABDOMINIS PLANE (TAP) BLOCK BILATERAL;  Surgeon: Leonard Mccallum MD;  [facility]     LAPAROSCOPIC APPENDECTOMY  7/30/2012    Procedure: LAPAROSCOPIC APPENDECTOMY;  Open Appendectomy;  Surgeon: Ron Austin MD;  [facility]     PANCREATECTOMY, TRANSPLANT AUTO ISLET CELL, COMBINED  1/6/2012    Procedure:COMBINED PANCREATECTOMY, TRANSPLANT AUTO ISLET CELL; Total  Pancreatectomy, Auto Islet Transplant, splenectomy, 18fr. transgastric-jejunal feeding tube placement, liver biopsy; Surgeon:PALAK LEE; [facility]     REPLACE GASTROSTOMY TUBE, PERCUTANEOUS N/A 8/30/2017    Procedure: REPLACE GASTROSTOMY TUBE, PERCUTANEOUS;  GJ Tube Change;  Surgeon: Jose Nath PA-C;  [facility]     SPLENECTOMY         Family History:  New changes since last visit:  none  Family History   Problem Relation Age of Onset     Hypertension Mother      DIABETES Mother      OSTEOPOROSIS Mother      CANCER Father      pancreatic cancer     DIABETES Maternal Grandmother      Cardiovascular Maternal Grandmother      CANCER Maternal Grandfather      lung cancer     CANCER Sister       "brain     CANCER Sister      liver cancer       Social History:  Social History     Social History Narrative     Unchanged. Lives in [place] with her .  Has 5 grandchildren       Physical Exam:  Vitals: /72 (Patient Position: Sitting)  Pulse 75  Temp 98.6  F (37  C) (Oral)  Resp 20  Ht 1.575 m (5' 2\")  Wt 66.3 kg (146 lb 3.2 oz)  SpO2 98%  BMI 26.74 kg/m2  BMI= Body mass index is 26.74 kg/(m^2).     General:  Well-appearing, NAD  Head: NC/AT  Eyes: sclera white  Abdomen:  Small possible hernia at left upper quadrant  Neuro:  Normal mental status, normal gross motor  Psych:  Communicative, with normal affect     Results:  A1c 6.3%  Lab Results   Component Value Date    A1C 7.5 02/01/2018    A1C 6.7 12/13/2017    A1C 6.4 10/19/2017    A1C 5.9 01/19/2017    A1C 6.8 11/16/2016       C-[id number]/1/2017  [facility]   Component Name Value Ref Range   C-PEPTIDE  <0.1 (L)   Comment:    Test Performed by:  [facility]  [address] [place] [address] 1.1 - 4.4 ng/mL    Specimen   Blood     Simultaneous glucose 12/1/17 = 81 mg/dL      Assessment:  1. 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  Treated for central adrenal insufficiency.      Chantell is a 52 year old with history of chronic pancreatitis who is s/p total pancreatectomy and islet autotransplant, with insulin dependence (pump therapy) complicated by insulin resistance, and several episodes of severe hypoglycemia and seizures (E10.641).  She is treated with a [employer] covered continuous subcutaneous insulin infusion pump.    Chantell has post-pancreatectomy diabetes-- essentially a surgical form of type 1 diabetes (E10.641).  She requires an insulin pump for management due to her multiple episodes of hypoglycemia and hypoglycemia unawareness, including episodes of seizures that required suspension of insulin pump and hospital admits. She " also should have a continuous glucose monitor for frequent monitoring because of her severe hypoglycemia history.  Chantell is measuring her BG 4-6 times every single day, as we documented in the clinic encounter today from review of her logs and her meter download.    Chantell requires frequent insulin adjustments to her insulin regimen based on her blood glucoses to control hyperglycemia and hypoglycemia.  Currently we are making these changes based on 4 BG checks per day, and we could make more informed adjustments with access to a CGM.  We made adjustments again today to her pump settings based on fingerstick BG readings.  These are indicated below.    Other issues today=  May have hernia (small) that is recurrently causing issues so will discuss with coordinators if should be seen in surgical clinic.  Surgery history is complicated as she has had several surgeons in past.      Plan:  1.  Changes to current diabetes regimen:  Patient Instructions   1)  New basal rates: 12am 0.50, 7am 0.60.    2)  You are underbolusing because you are worried about lows due to your severe hypoglycemia history.  So that you can feel safe taking your bolus, we are setting your bolus settings very low today:  I:C ratio 40g   mg/dL  Target  mg/dL    3)  I want you to follow the bolus wizard instructions for correcting for BGs and carbs.  If it is too much and you get low, let me know and we can back off further on the settings.    4)  With the rafia, you will need to manually plug BGs into the pump for correction dosing.  Please do this at least every 4-6 hours if you are running high.    5)  Keep scheduled visit in June.      2.  Frequency of blood sugar checks:  Premeal, bedtime, and additional measurements PRN-- Should have strips sufficient to test 8 times per day given her labiltiy history.    3.  Continue routine follow up for autoislet transplant patients:  Mixed meal test (6 mL/kg BoostHP to max of 360 mL) at 3 months, 6  months, and once a year post transplant.  Hemoglobin A1c levels at these time points and quarterly.    4.  Other issues addressed today:  As above        Follow up:  1 months    Contact me for questions at [phone] or [phone].  Emergency number to reach pediatric endocrinology after hours is [phone].        Amena Maher MD  , Pediatric Endocrinology and Diabetes  [facility] [place]  [facility]      VISIT TIME:  25 minutes of face to face time, >50% spent in counseling and coordination of care    Amena Maher MD

## 2018-07-05 ENCOUNTER — RADIANT APPOINTMENT (OUTPATIENT)
Dept: GENERAL RADIOLOGY | Facility: CLINIC | Age: 53
End: 2018-07-05
Attending: ORTHOPAEDIC SURGERY
Payer: MEDICARE

## 2018-07-05 ENCOUNTER — OFFICE VISIT (OUTPATIENT)
Dept: ORTHOPEDICS | Facility: CLINIC | Age: 53
End: 2018-07-05
Payer: MEDICARE

## 2018-07-05 VITALS
DIASTOLIC BLOOD PRESSURE: 75 MMHG | SYSTOLIC BLOOD PRESSURE: 158 MMHG | HEIGHT: 62 IN | BODY MASS INDEX: 26.68 KG/M2 | WEIGHT: 145 LBS | RESPIRATION RATE: 18 BRPM

## 2018-07-05 DIAGNOSIS — M25.562 LEFT KNEE PAIN, UNSPECIFIED CHRONICITY: ICD-10-CM

## 2018-07-05 DIAGNOSIS — M22.42 CHONDROMALACIA OF LEFT PATELLA: ICD-10-CM

## 2018-07-05 DIAGNOSIS — M25.562 LEFT KNEE PAIN, UNSPECIFIED CHRONICITY: Primary | ICD-10-CM

## 2018-07-05 PROCEDURE — 99203 OFFICE O/P NEW LOW 30 MIN: CPT | Performed by: ORTHOPAEDIC SURGERY

## 2018-07-05 PROCEDURE — 73562 X-RAY EXAM OF KNEE 3: CPT | Mod: LT

## 2018-07-05 NOTE — PATIENT INSTRUCTIONS
Diagnosis  Chondromalacia patella.  Glucosamine 1500 mg/day and chondroitin sulfate 1200 mg/day advised.  Avoid kneeling and crawling.  Do quadriceps strengthening (especially VMO) .  Do hip abduction strengthening.                      Patellofemoral Pain Syndrome (Runner's Knee)             What is patellofemoral pain syndrome?   Patellofemoral pain syndrome is pain behind the kneecap. It may also be called patellofemoral disorder, patellar malalignment, runner's knee, and chondromalacia.   How does it occur?   Patellofemoral pain syndrome can occur from overuse of the knee in sports and activities such as running, walking, jumping, or bicycling.   The kneecap (patella) is attached to the large group of muscles in the thigh called the quadriceps. It is also attached to the shin bone by the patellar tendon. The kneecap fits into grooves in the end of the thigh bone (femur) called the femoral condyle. With repeated bending and straightening of the knee, you can irritate the inside surface of the kneecap and cause pain.   Patellofemoral pain syndrome also may result from the way your hips, legs, knees, or feet are aligned. For example, if you have wide hips or underdeveloped thigh muscles, or if you are knock-kneed You may also have this problem if your foot flattens too much when you walk or run (a condition called over-pronation).   What are the symptoms?   The main symptom is pain behind the kneecap. You may have pain when you walk, run, or sit for a long time. The pain is usually worse when you walk downhill or down stairs. Your knee may swell at times. You may feel or hear snapping, popping, or grinding in the knee.   How is it diagnosed?   Your healthcare provider will review your symptoms and examine your knee. You will have knee X-rays. You may have an MRI to check for damage to the surface of the patella or femur or another injury.   How is it treated?   Treatment includes the following:   Put an ice pack,  gel pack, or package of frozen vegetables, wrapped in a cloth on the area every 3 to 4 hours, for up to 20 minutes at a time.   Raise the knee on a pillow when you sit or lie down.   Take an anti-inflammatory medicine such as ibuprofen, or other medicine as directed by your provider. Nonsteroidal anti-inflammatory medicines (NSAIDs) may cause stomach bleeding and other problems. These risks increase with age. Read the label and take as directed. Unless recommended by your healthcare provider, do not take for more than 10 days.   Follow your provider's instructions for doing exercises to help you recover. Your healthcare provider will show you exercises to help decrease the pain behind your kneecap.   If you over-pronate, your healthcare provider may recommend shoe inserts, called orthotics. You can buy orthotics at a pharmacy or athletic shoe store or they can be custom-made.   Use an infrapatellar strap, a strap placed below the kneecap over the patellar tendon.   Wear a neoprene knee sleeve, which will give support to your knee and patella.   While you recover from your injury, you will need to change your sport or activity to one that does not make your condition worse. For example, you may need to bicycle or swim instead of run.   In cases of severe patellofemoral pain syndrome, surgery may be recommended.   How long will the effects last?   Patellofemoral pain often lasts a long time and can come back after symptoms were better for a while. Treatment requires proper rehabilitation exercises that are done regularly.   When can I return to my normal activities?   Everyone recovers from an injury at a different rate. Return to your activities depends on how soon your knee recovers, not by how many days or weeks it has been since your injury has occurred. In general, the longer you have symptoms before you start treatment, the longer it will take to get better. The goal is to return you to your normal activities as  soon as is safely possible. If you return too soon you may worsen your injury.   You may safely return to your normal activities when, starting from the top of the list and progressing to the end, each of the following is true:   Your injured knee can be fully straightened and bent without pain.   Your knee and leg have regained normal strength compared to the uninjured knee and leg.   You are able to walk, bend, and squat without pain.   How can I prevent runner's knee?   Runner's knee can best be prevented by strengthening your thigh muscles, particularly the inside part of this muscle group. It is also important to wear shoes that fit well and that have good arch supports.     Published by [employer].  This content is reviewed periodically and is subject to change as new health information becomes available. The information is intended to inform and educate and is not a replacement for medical evaluation, advice, diagnosis or treatment by a healthcare professional.   Written by Jose Montejo MD, for [employer]   ? 2010 [employer] [facility] and/or its affiliates. All Rights Reserved.   Copyright   [employer] 2011  Adult Health Advisor    Patellofemoral Pain Syndrome (Runner's Knee) Rehabilitation Exercises              You can do the hamstring stretch right away. When the pain in your knee has decreased, you can do the quadriceps stretch and start strengthening the thigh muscles using the rest of the exercises.   Standing hamstring stretch: Put the heel of the leg on your injured side on a stool about 15 inches high. Keep your leg straight. Lean forward, bending at the hips, until you feel a mild stretch in the back of your thigh. Make sure you don't roll your shoulders or bend at the waist when doing this or you will stretch your lower back instead of your leg. Hold the stretch for 15 to 30 seconds. Repeat 3 times.   Quadriceps stretch: Stand an arm's length away from the wall with your injured leg  farthest from the wall. Facing straight ahead, brace yourself by keeping one hand against the wall. With your other hand, grasp the ankle of your injured leg and pull your heel toward your buttocks. Don't arch or twist your back. Keep your knees together. Hold this stretch for 15 to 30 seconds.   Side-lying leg lift: Lie on your uninjured side. Tighten the front thigh muscles on your injured leg and lift that leg 8 to 10 inches away from the other leg. Keep the leg straight and lower it slowly. Do 3 sets of 10.   Quad sets: Sit on the floor with your injured leg straight and your other leg bent. Press the back of the knee of your injured leg against the floor by tightening the muscles on the top of your thigh. Hold this position 10 seconds. Relax. Do 3 sets of 10.   Straight leg raise: Lie on your back with your legs straight out in front of you. Bend the knee on your uninjured side and place the foot flat on the floor. Tighten the thigh muscle on your injured side and lift your leg about 8 inches off the floor. Keep your leg straight and your thigh muscle tight. Slowly lower your leg back down to the floor. Do 3 sets of 10.   Step-up: Stand with the foot of your injured leg on a support 3 to 5 inches high (like a small step or block of wood). Keep your other foot flat on the floor. Shift your weight onto the injured leg on the support. Straighten your injured leg as the other leg comes off the floor. Return to the starting position by bending your injured leg and slowly lowering your uninjured leg back to the floor. Do 3 sets of 10.   Wall squat with a ball: Stand with your back, shoulders, and head against a wall. Look straight ahead. Keep your shoulders relaxed and your feet 3 feet from the wall and shoulder's width apart. Place a soccer or basketball-sized ball behind your back. Keeping your back against the wall, slowly squat down to a 45-degree angle. Your thighs will not yet be parallel to the floor. Hold  this position for 10 seconds and then slowly slide back up the wall. Repeat 10 times. Build up to 3 sets of 10.   Knee stabilization: Wrap a piece of elastic tubing around the ankle of the uninjured leg. Tie a knot in the other end of the tubing and close it in a door.   0. Stand facing the door on the leg without tubing and bend your knee slightly, keeping your thigh muscles tight. While maintaining this position, move the leg with the tubing straight back behind you. Do 3 sets of 10.   0. Turn 90 degrees so the leg without tubing is closest to the door. Move the leg with tubing away from your body. Do 3 sets of 10.   0. Turn 90 degrees again so your back is to the door. Move the leg with tubing straight out in front of you. Do 3 sets of 10.   0. Turn your body 90 degrees again so the leg with tubing is closest to the door. Move the leg with tubing across your body. Do 3 sets of 10.   Hold onto a chair if you need help balancing. This exercise can be made even more challenging by standing on a pillow while you move the leg with tubing.   Resisted terminal knee extension: Make a loop from a piece of elastic tubing by tying a knot in both ends. Close both knots in a door. Step into the loop so the tubing is around the back of your injured leg. Lift the other foot off the ground. Hold onto a chair for balance, if needed. Bend the knee on the leg with tubing about 45 degrees. Slowly straighten your leg, keeping your thigh muscle tight as you do this. Do this 10 times. Do 3 sets. An easier way to do this is to stand on both legs for better support while you do the exercise.   Standing calf stretch: Stand facing a wall with your hands on the wall at about eye level. Keep your injured leg back with your heel on the floor. Keep the other leg forward with the knee bent. Turn your back foot slightly inward (as if you were pigeon-toed). Slowly lean into the wall until you feel a stretch in the back of your calf. Hold the  stretch for 15 to 30 seconds. Return to the starting position. Repeat 3 times. Do this exercise several times each day.   Clam exercise: Lie on your uninjured side with your hips and knees bent and feet together. Slowly raise your top leg toward the ceiling while keeping your heels touching each other. Hold for 2 seconds and lower slowly. Do 3 sets of 10 repetitions.   Iliotibial band stretch: Side-bending: Cross one leg in front of the other leg and lean in the opposite direction from the front leg. Reach the arm on the side of the back leg over your head while you do this. Hold this position for 15 to 30 seconds. Return to the starting position. Repeat 3 times and then switch legs and repeat the exercise.   Published by [employer].  This content is reviewed periodically and is subject to change as new health information becomes available. The information is intended to inform and educate and is not a replacement for medical evaluation, advice, diagnosis or treatment by a healthcare professional.   Written by Gita Cheung MS, PT, and Genna Lopez PT, [facility], [facility], for [facility].   ? 2010 [facility] and/or its affiliates. All Rights Reserved.   Copyright   [employer] 2011  Adult Health Advisor                           Strengthening exercises:        Leg strengthening:  Hold onto the sink with painful leg toward the sink.  Lift painful leg out to touch the wall with the heel.  Lift 10-15 times, twice a day.  Keep on until you don't limp.

## 2018-07-05 NOTE — MR AVS SNAPSHOT
After Visit Summary   7/5/2018    Chantell Kidd    MRN: [id number]           Patient Information     Date Of Birth          1965        Visit Information        Provider Department      7/5/2018 10:30 AM Kike Otot MD [facility]        Today's Diagnoses     Left knee pain, unspecified chronicity    -  1      Care Instructions    Diagnosis  Chondromalacia patella.  Glucosamine 1500 mg/day and chondroitin sulfate 1200 mg/day advised.  Avoid kneeling and crawling.  Do quadriceps strengthening (especially VMO) .  Do hip abduction strengthening.                      Patellofemoral Pain Syndrome (Runner's Knee)             What is patellofemoral pain syndrome?   Patellofemoral pain syndrome is pain behind the kneecap. It may also be called patellofemoral disorder, patellar malalignment, runner's knee, and chondromalacia.   How does it occur?   Patellofemoral pain syndrome can occur from overuse of the knee in sports and activities such as running, walking, jumping, or bicycling.   The kneecap (patella) is attached to the large group of muscles in the thigh called the quadriceps. It is also attached to the shin bone by the patellar tendon. The kneecap fits into grooves in the end of the thigh bone (femur) called the femoral condyle. With repeated bending and straightening of the knee, you can irritate the inside surface of the kneecap and cause pain.   Patellofemoral pain syndrome also may result from the way your hips, legs, knees, or feet are aligned. For example, if you have wide hips or underdeveloped thigh muscles, or if you are knock-kneed You may also have this problem if your foot flattens too much when you walk or run (a condition called over-pronation).   What are the symptoms?   The main symptom is pain behind the kneecap. You may have pain when you walk, run, or sit for a long time. The pain is usually worse when you walk downhill or down stairs. Your knee may swell at  times. You may feel or hear snapping, popping, or grinding in the knee.   How is it diagnosed?   Your healthcare provider will review your symptoms and examine your knee. You will have knee X-rays. You may have an MRI to check for damage to the surface of the patella or femur or another injury.   How is it treated?   Treatment includes the following:   Put an ice pack, gel pack, or package of frozen vegetables, wrapped in a cloth on the area every 3 to 4 hours, for up to 20 minutes at a time.   Raise the knee on a pillow when you sit or lie down.   Take an anti-inflammatory medicine such as ibuprofen, or other medicine as directed by your provider. Nonsteroidal anti-inflammatory medicines (NSAIDs) may cause stomach bleeding and other problems. These risks increase with age. Read the label and take as directed. Unless recommended by your healthcare provider, do not take for more than 10 days.   Follow your provider's instructions for doing exercises to help you recover. Your healthcare provider will show you exercises to help decrease the pain behind your kneecap.   If you over-pronate, your healthcare provider may recommend shoe inserts, called orthotics. You can buy orthotics at a pharmacy or athletic shoe store or they can be custom-made.   Use an infrapatellar strap, a strap placed below the kneecap over the patellar tendon.   Wear a neoprene knee sleeve, which will give support to your knee and patella.   While you recover from your injury, you will need to change your sport or activity to one that does not make your condition worse. For example, you may need to bicycle or swim instead of run.   In cases of severe patellofemoral pain syndrome, surgery may be recommended.   How long will the effects last?   Patellofemoral pain often lasts a long time and can come back after symptoms were better for a while. Treatment requires proper rehabilitation exercises that are done regularly.   When can I return to my  normal activities?   Everyone recovers from an injury at a different rate. Return to your activities depends on how soon your knee recovers, not by how many days or weeks it has been since your injury has occurred. In general, the longer you have symptoms before you start treatment, the longer it will take to get better. The goal is to return you to your normal activities as soon as is safely possible. If you return too soon you may worsen your injury.   You may safely return to your normal activities when, starting from the top of the list and progressing to the end, each of the following is true:   Your injured knee can be fully straightened and bent without pain.   Your knee and leg have regained normal strength compared to the uninjured knee and leg.   You are able to walk, bend, and squat without pain.   How can I prevent runner's knee?   Runner's knee can best be prevented by strengthening your thigh muscles, particularly the inside part of this muscle group. It is also important to wear shoes that fit well and that have good arch supports.     Published by [employer].  This content is reviewed periodically and is subject to change as new health information becomes available. The information is intended to inform and educate and is not a replacement for medical evaluation, advice, diagnosis or treatment by a healthcare professional.   Written by Jose Montejo MD, for [employer]   ? 2010 [employer] and/or its affiliates. All Rights Reserved.   Copyright   [employer] Reference Systems 2011  Adult Health Advisor    Patellofemoral Pain Syndrome (Runner's Knee) Rehabilitation Exercises              You can do the hamstring stretch right away. When the pain in your knee has decreased, you can do the quadriceps stretch and start strengthening the thigh muscles using the rest of the exercises.   Standing hamstring stretch: Put the heel of the leg on your injured side on a stool about 15 inches high. Keep your leg  straight. Lean forward, bending at the hips, until you feel a mild stretch in the back of your thigh. Make sure you don't roll your shoulders or bend at the waist when doing this or you will stretch your lower back instead of your leg. Hold the stretch for 15 to 30 seconds. Repeat 3 times.   Quadriceps stretch: Stand an arm's length away from the wall with your injured leg farthest from the wall. Facing straight ahead, brace yourself by keeping one hand against the wall. With your other hand, grasp the ankle of your injured leg and pull your heel toward your buttocks. Don't arch or twist your back. Keep your knees together. Hold this stretch for 15 to 30 seconds.   Side-lying leg lift: Lie on your uninjured side. Tighten the front thigh muscles on your injured leg and lift that leg 8 to 10 inches away from the other leg. Keep the leg straight and lower it slowly. Do 3 sets of 10.   Quad sets: Sit on the floor with your injured leg straight and your other leg bent. Press the back of the knee of your injured leg against the floor by tightening the muscles on the top of your thigh. Hold this position 10 seconds. Relax. Do 3 sets of 10.   Straight leg raise: Lie on your back with your legs straight out in front of you. Bend the knee on your uninjured side and place the foot flat on the floor. Tighten the thigh muscle on your injured side and lift your leg about 8 inches off the floor. Keep your leg straight and your thigh muscle tight. Slowly lower your leg back down to the floor. Do 3 sets of 10.   Step-up: Stand with the foot of your injured leg on a support 3 to 5 inches high (like a small step or block of wood). Keep your other foot flat on the floor. Shift your weight onto the injured leg on the support. Straighten your injured leg as the other leg comes off the floor. Return to the starting position by bending your injured leg and slowly lowering your uninjured leg back to the floor. Do 3 sets of 10.   Wall squat  with a ball: Stand with your back, shoulders, and head against a wall. Look straight ahead. Keep your shoulders relaxed and your feet 3 feet from the wall and shoulder's width apart. Place a soccer or basketball-sized ball behind your back. Keeping your back against the wall, slowly squat down to a 45-degree angle. Your thighs will not yet be parallel to the floor. Hold this position for 10 seconds and then slowly slide back up the wall. Repeat 10 times. Build up to 3 sets of 10.   Knee stabilization: Wrap a piece of elastic tubing around the ankle of the uninjured leg. Tie a knot in the other end of the tubing and close it in a door.   0. Stand facing the door on the leg without tubing and bend your knee slightly, keeping your thigh muscles tight. While maintaining this position, move the leg with the tubing straight back behind you. Do 3 sets of 10.   0. Turn 90 degrees so the leg without tubing is closest to the door. Move the leg with tubing away from your body. Do 3 sets of 10.   0. Turn 90 degrees again so your back is to the door. Move the leg with tubing straight out in front of you. Do 3 sets of 10.   0. Turn your body 90 degrees again so the leg with tubing is closest to the door. Move the leg with tubing across your body. Do 3 sets of 10.   Hold onto a chair if you need help balancing. This exercise can be made even more challenging by standing on a pillow while you move the leg with tubing.   Resisted terminal knee extension: Make a loop from a piece of elastic tubing by tying a knot in both ends. Close both knots in a door. Step into the loop so the tubing is around the back of your injured leg. Lift the other foot off the ground. Hold onto a chair for balance, if needed. Bend the knee on the leg with tubing about 45 degrees. Slowly straighten your leg, keeping your thigh muscle tight as you do this. Do this 10 times. Do 3 sets. An easier way to do this is to stand on both legs for better support while  you do the exercise.   Standing calf stretch: Stand facing a wall with your hands on the wall at about eye level. Keep your injured leg back with your heel on the floor. Keep the other leg forward with the knee bent. Turn your back foot slightly inward (as if you were pigeon-toed). Slowly lean into the wall until you feel a stretch in the back of your calf. Hold the stretch for 15 to 30 seconds. Return to the starting position. Repeat 3 times. Do this exercise several times each day.   Clam exercise: Lie on your uninjured side with your hips and knees bent and feet together. Slowly raise your top leg toward the ceiling while keeping your heels touching each other. Hold for 2 seconds and lower slowly. Do 3 sets of 10 repetitions.   Iliotibial band stretch: Side-bending: Cross one leg in front of the other leg and lean in the opposite direction from the front leg. Reach the arm on the side of the back leg over your head while you do this. Hold this position for 15 to 30 seconds. Return to the starting position. Repeat 3 times and then switch legs and repeat the exercise.   Published by [employer].  This content is reviewed periodically and is subject to change as new health information becomes available. The information is intended to inform and educate and is not a replacement for medical evaluation, advice, diagnosis or treatment by a healthcare professional.   Written by Gita Cheung MS, PT, and Genna Lopez PT, [facility], [facility], for [facility].   ? 2010 [employer] and/or its affiliates. All Rights Reserved.   Copyright   Clinical Reference Systems 2011  Adult Health Advisor                           Strengthening exercises:        Leg strengthening:  Hold onto the sink with painful leg toward the sink.  Lift painful leg out to touch the wall with the heel.  Lift 10-15 times, twice a day.  Keep on until you don't limp.                                            Follow-ups after your visit        Your next 10  appointments already scheduled     Jul 12, 2018  9:05 AM CDT   (Arrive by 8:50 AM)   Return Visit with Ankit Lopez MD   [facility] ([facility])    [address] [place] [address]  4th [facility] [address]   [phone]            Jul 12, 2018 10:15 AM CDT   (Arrive by 10:00 AM)   Return [place] with Kevin Alves MD   [facility] ([facility])    [address] [place] [address]  [address]  [facility] [address]   [phone]            Aug 02, 2018  9:20 AM CDT   (Arrive by 9:05 AM)   Return [place] with Amena Maher MD   [facility] ([facility])    [address] [place] [address]   [phone]            Aug 21, 2018 10:20 AM CDT   (Arrive by 10:05 AM)   Return Visit with Ron Morgan MD   [facility] [place] [facility] ([facility])    [address] [place] [address]   [phone]              Who to contact     If you have questions or need follow up information about today's clinic visit or your schedule please [facility] at [phone].  Normal or non-critical lab and imaging results will be communicated to you by [employer], letter or phone within 4 business days after the clinic has received the results. If you do not hear from us within 7 days, please contact the clinic through [employer] or phone. If you have a critical or abnormal lab result, we will notify you by phone as soon as possible.  Submit refill requests through [employer] or call your pharmacy and they will forward the refill request to us. Please allow 3 business days for your refill to be completed.          Additional Information About Your Visit        [employer] Information     [employer] gives you secure access to your electronic health record. If you see a primary care provider, you can  "also send messages to your care team and make appointments. If you have questions, please call your primary care clinic.  If you do not have a primary care provider, please call [phone] and they will assist you.        Care EveryWhere ID     This is your Care EveryWhere ID. This could be used by other organizations to access your [facility] medical records  [id number]        Your Vitals Were     Respirations Height BMI (Body Mass Index)             18 1.575 m (5' 2\") 26.52 kg/m2          Blood Pressure from Last 3 Encounters:   07/05/18 158/75   05/17/18 123/72   03/01/18 109/73    Weight from Last 3 Encounters:   07/05/18 65.8 kg (145 lb)   05/17/18 66.3 kg (146 lb 3.2 oz)   03/01/18 69.2 kg (152 lb 9.6 oz)               Primary Care Provider Office Phone # Fax #    Ron Kvng Morgan [phone]       [address] [place] [address]        Equal Access to Services     [facility]: Hadii aad ku hadasho Soomaali, waaxda luqadaha, qaybta kaalmada adeegyada, waxay scout hayjose christianson . So [facility] [phone].    ATENCIÓN: Si habla español, tiene a webb disposición servicios gratuitos de asistencia lingüística. Llame al [phone].    We comply with applicable federal civil rights laws and [place] laws. We do not discriminate on the basis of race, color, national origin, age, disability, sex, sexual orientation, or gender identity.            Thank you!     Thank you for choosing [facility]  for your care. Our goal is always to provide you with excellent care. Hearing back from our patients is one way we can continue to improve our services. Please take a few minutes to complete the written survey that you may receive in the mail after your visit with us. Thank you!             Your Updated Medication List - Protect others around you: Learn how to safely use, store and throw away your medicines at www.disposemymeds.org.          This list is " accurate as of 7/5/18 11:36 AM.  Always use your most recent med list.                   Brand Name Dispense Instructions for use Diagnosis    acetaminophen 500 MG Caps      Take 1,000 mg by mouth three times a day as needed.        alendronate 70 MG tablet    FOSAMAX    4 tablet    Take 1 tablet (70 mg) by mouth every 7 days On Sundays take first thing in the morning with plain water and remain upright for at least 30 minutes and until after first food of the day  Do not restart Fosamax (alendronate) until your difficulty swallowing has resolved and you have finished the entire course of fluconazole (Diflucan).    Osteoporosis       alum & mag hydroxide-simethicone 200-200-25 MG Chew chewable tablet    GELUSIL    100 tablet    CHEW AND SWALLOW 2 CHEWS EVERY 4 HOURS AS NEEDED FOR INDIGESTION    Abdominal pain       amylase-lipase-protease 51199 units Cpep    CREON 12    2160 capsule    Take 6 capsules with meals and 3 with snacks.  This is up to 24 capsules per day.    Exocrine pancreatic insufficiency       aspirin 81 MG tablet      Take 81 mg by mouth daily.        blood glucose monitoring lancets     102 each    by Lancet route. Use to test blood sugar daily or as directed.    Chronic abdominal pain       * blood glucose monitoring test strip    no brand specified    240 each    Use to test blood glucoses 6-8 times per day.    Post-pancreatectomy diabetes (H)       * blood glucose monitoring test strip    NOEMI CONTOUR NEXT    240 each    Use to test blood sugar 8 times daily.    Post-pancreatectomy diabetes (H)       BOOST HIGH PROTEIN Liqd      Also can use Ensure clear (available over the counter)    Pancreatic insufficiency       cyclobenzaprine 5 MG tablet    FLEXERIL    42 tablet    Take 1 tablet (5 mg) by mouth 3 times daily as needed for muscle spasms    Abdominal pain, generalized       diclofenac 1 % Gel topical gel    VOLTAREN     2 g Apply 2 g to skin four times a day as needed (to affected upper  extremity joint(s)). Maximum 8g/day per joint, 16g/day total.        dicyclomine 10 MG capsule    BENTYL    40 capsule    TAKE ONE CAPSULE BY MOUTH EVERY 6 HOURS AS NEEDED    Abdominal pain, epigastric       docusate sodium 100 MG capsule    DOCQLACE    90 capsule    Take 1 capsule (100 mg) by mouth daily as needed for constipation    AP (abdominal pain)       dronabinol 2.5 MG capsule    MARINOL    56 capsule    Take 2 capsules (5 mg) by mouth 2 times daily as needed    Routine health maintenance       * DULoxetine 30 MG EC capsule    CYMBALTA    90 capsule    Take 1 capsule (30 mg) by mouth daily With 60mg capsule for total dose of 90mg    Major depressive disorder, recurrent episode, moderate (H), CIERRA (generalized anxiety disorder)       * DULoxetine 60 MG EC capsule    CYMBALTA    90 capsule    Take 1 capsule (60 mg) by mouth daily With 30mg capsule for total dose of 90mg    Major depressive disorder, recurrent episode, moderate (H), CIERRA (generalized anxiety disorder)       fluconazole 200 MG tablet    DIFLUCAN    15 tablet    Take 2 tabs the first day and 1 tab for the next 13 days        furosemide 20 MG tablet    LASIX    180 tablet    Take 1 tablet (20 mg) by mouth 2 times daily    Edema, unspecified type       glucagon 1 MG kit    GLUCAGON EMERGENCY    1 mg    Inject 1 mg into the muscle once for 1 dose    Hypoglycemia unawareness in type 1 diabetes mellitus (H), Type 1 diabetes mellitus with hypoglycemic coma (H)       hydrocortisone 10 MG tablet    CORTEF    60 tablet    Take 10 mg in the morning and 10 mg at bedtime. Watch for hypoglycemia recurrence.    Hypoglycemia, Adrenal insufficiency (H)       insulin aspart 100 UNITS/ML injection    NovoLOG VIAL    36 vial    As directed in pump    Type 1 diabetes mellitus with complications (H)       insulin pen needle 31G X 5 MM     2 Box    RX# [id number]  Pen Needle UC 31G UF IV Mini  Use 4-8 needles per day for insulin injections.    Chronic abdominal pain        levothyroxine 112 MCG tablet    SYNTHROID/LEVOTHROID    30 tablet    Take 1 tablet (112 mcg) by mouth daily    Hypothyroidism       linaclotide 145 MCG capsule    LINZESS    30 capsule    Take 1 capsule (145 mcg) by mouth every morning (before breakfast)    Constipation, unspecified constipation type, Chronic back pain, unspecified back location, unspecified back pain laterality, Physical deconditioning, Primary insomnia       metoclopramide 5 MG tablet    REGLAN    100 tablet    Take 2 tablets (10 mg) by mouth 3 times daily (before meals    Routine health maintenance       nystatin 633813 unit/mL Susp suspension    MYCOSTATIN    60 mL    Take 1 mL (100,000 Units) by mouth 4 times daily    Odynophagia       ondansetron 4 MG ODT tab    ZOFRAN-ODT    60 tablet    DISSOLVE ONE TABLET ON THE TONGUE EVERY 6 HOURS AS NEEDED FOR NAUSEA AND VOMITING    Nausea       pantoprazole 40 MG EC tablet    PROTONIX    180 tablet    Take 1 tablet (40 mg) by mouth 2 times daily    H. pylori infection       polyethylene glycol Packet    MIRALAX/GLYCOLAX    14 each    Take 1 packet by mouth 2 times daily as needed for constipation    Chronic constipation       potassium chloride SA 20 MEQ CR tablet    K-DUR/KLOR-CON M    90 tablet    Take 1 tablet (20 mEq) by mouth daily    Hypokalemia       pregabalin 150 MG capsule    LYRICA    90 capsule    Take 1 capsule (150 mg) by mouth 3 times daily    Chronic generalized abdominal pain       * senna 8.6 MG tablet    SENNA LAX    90 tablet    Take 1-2 tablets by mouth daily as needed for constipation    Other constipation       * senna 8.6 MG tablet    SENNA LAX    90 tablet    TAKE 1-2 TABLETS BY MOUTH ONCE DAILY AS NEEDED FOR CONSTIPATION    Other constipation       sodium bicarbonate 650 MG tablet     135 tablet    Take one-half tablet (325 mg), via feeding tube every 4 hours.    Malnutrition (H)       SUMAtriptan 50 MG tablet    IMITREX    30 tablet    Take 1 tablet (50 mg) by mouth at onset  of headache for migraine Take 1 Tab by mouth Once as needed for Migraine Headache. May repeat after two hours.  Maximum dose 200 mg/24 hours.    Migraine       topiramate 100 MG tablet    TOPAMAX    180 tablet    Take 1 tablet (100 mg) by mouth 2 times daily    Migraine, unspecified, not intractable, without status migrainosus       traZODone 100 MG tablet    DESYREL    100 tablet    TAKE 1-2 TABLETS BY MOUTH AN HOUR BEFORE BEDTIME    Primary insomnia, Constipation, unspecified constipation type, Chronic back pain, unspecified back location, unspecified back pain laterality, Physical deconditioning       * Notice:  This list has 6 medication(s) that are the same as other medications prescribed for you. Read the directions carefully, and ask your doctor or other care provider to review them with you.

## 2018-07-05 NOTE — LETTER
7/5/2018         RE: Chantell Kidd  [address]  [facility] [id number]        Dear Colleague,    Thank you for referring your patient, Chantell Kidd, to the [facility]. Please see a copy of my visit note below.    Chantell Kidd is a 52 year old female who is seen as self referral for left knee pain.     Past Medical History:   Diagnosis Date     Chronic abdominal pain      Chronic pancreatitis (H)     S/P pancreatectomy     Depression with anxiety      Diabetes mellitus (H) 1/2012     Gastro-oesophageal reflux disease      Hypothyroidism 4/23/2015     Kidney stones      Low serum cortisol level (H)      Migraines      Other chronic pain     STOMACH     Other chronic pain     LUMBAR SPINE     Spasm of sphincter of Oddi        Past Surgical History:   Procedure Laterality Date     ARTHROPLASTY CARPOMETACARPAL (THUMB JOINT)  5/2/2014    Procedure: ARTHROPLASTY CARPOMETACARPAL (THUMB JOINT);  Surgeon: Carina Panda MD;  Location: [facility]     CHOLECYSTECTOMY  2004     COLONOSCOPY  7/18/2014    Procedure: COLONOSCOPY;  Surgeon: Aurora Sahu MD;  Location: [facility]     COLONOSCOPY N/A 8/1/2017    Procedure: COLONOSCOPY;  Colonoscopy and upper endoscopy;  Surgeon: Deirdre Harris MD;  [facility]: [facility]     ENDOSCOPIC RETROGRADE CHOLANGIOPANCREATOGRAM       ENDOSCOPIC RETROGRADE CHOLANGIOPANCREATOGRAM  4/19/2011    Procedure:ENDOSCOPIC RETROGRADE CHOLANGIOPANCREATOGRAM; Pancreatic Stent Placement       ENDOSCOPIC RETROGRADE CHOLANGIOPANCREATOGRAM  5/26/2011    Procedure:ENDOSCOPIC RETROGRADE CHOLANGIOPANCREATOGRAM; with Pancreatic Stent Removal; Surgeon:DALE MIMS; [facility]     ENDOSCOPY UPPER, COLONOSCOPY, COMBINED  4/25/2012    Procedure:COMBINED ENDOSCOPY UPPER, COLONOSCOPY; Enteroscopy with Bile Duct Stent Removal, Colonoscopy  *[facility] Safe Room*; Surgeon:GRACY GODWIN; [facility]     ESOPHAGOSCOPY, GASTROSCOPY, DUODENOSCOPY (EGD), COMBINED   5/26/2011    Procedure:COMBINED ESOPHAGOSCOPY, GASTROSCOPY, DUODENOSCOPY (EGD); Surgeon:DALE MIMS; [facility]     ESOPHAGOSCOPY, GASTROSCOPY, DUODENOSCOPY (EGD), COMBINED N/A 10/30/2014    Procedure: COMBINED ESOPHAGOSCOPY, GASTROSCOPY, DUODENOSCOPY (EGD), BIOPSY SINGLE OR MULTIPLE;  Surgeon: Sarai Moon MD;  [facility]     ESOPHAGOSCOPY, GASTROSCOPY, DUODENOSCOPY (EGD), COMBINED Left 7/6/2015    Procedure: COMBINED ESOPHAGOSCOPY, GASTROSCOPY, DUODENOSCOPY (EGD), BIOPSY SINGLE OR MULTIPLE;  Surgeon: Thomas Estrada MD;  [facility]     ESOPHAGOSCOPY, GASTROSCOPY, DUODENOSCOPY (EGD), COMBINED N/A 7/8/2016    Procedure: COMBINED ESOPHAGOSCOPY, GASTROSCOPY, DUODENOSCOPY (EGD), BIOPSY SINGLE OR MULTIPLE;  Surgeon: Eloy Klein MD;  [facility]     ESOPHAGOSCOPY, GASTROSCOPY, DUODENOSCOPY (EGD), COMBINED N/A 8/4/2016    Procedure: COMBINED ESOPHAGOSCOPY, GASTROSCOPY, DUODENOSCOPY (EGD), BIOPSY SINGLE OR MULTIPLE;  Surgeon: Jason Brown MD;  Location:  GI     ESOPHAGOSCOPY, GASTROSCOPY, DUODENOSCOPY (EGD), COMBINED N/A 8/1/2017    Procedure: COMBINED ESOPHAGOSCOPY, GASTROSCOPY, DUODENOSCOPY (EGD);;  Surgeon: Deirdre Harris MD;  Location:  GI     GYN SURGERY      Hysterectomy and USO     [facility] ENDOSCOPY W EUS  7/20/2011    Procedure:COMBINED ENDOSCOPIC ULTRASOUND, ESOPHAGOSCOPY, GASTROSCOPY, DUODENOSCOPY (EGD); Surgeon:DARVIN DONOHUE; [facility]     HERNIORRHAPHY VENTRAL N/A 9/15/2016    Procedure: HERNIORRHAPHY VENTRAL;  Surgeon: Juanita Bernabe MD;  [facility]     HYSTERECTOMY  1997 or 1998    USO     INCISION AND DRAINAGE ABDOMEN WASHOUT, COMBINED  8/16/2012    Procedure: COMBINED INCISION AND DRAINAGE ABDOMEN WASHOUT;  ,debridement and Drainage Post Appendectomy;  Surgeon: Ron Austin MD;  [facility]     INJECT TRANSVERSUS ABDOMINIS PLANE (TAP) BLOCK BILATERAL Bilateral 5/26/2016     Procedure: INJECT TRANSVERSUS ABDOMINIS PLANE (TAP) BLOCK BILATERAL;  Surgeon: Leonard Mccallum MD;  [facility]     LAPAROSCOPIC APPENDECTOMY  7/30/2012    Procedure: LAPAROSCOPIC APPENDECTOMY;  Open Appendectomy;  Surgeon: Ron Austin MD;  [facility]     PANCREATECTOMY, TRANSPLANT AUTO ISLET CELL, COMBINED  1/6/2012    Procedure:COMBINED PANCREATECTOMY, TRANSPLANT AUTO ISLET CELL; Total  Pancreatectomy, Auto Islet Transplant, splenectomy, 18fr. transgastric-jejunal feeding tube placement, liver biopsy; Surgeon:PALAK LEE; [facility]     REPLACE GASTROSTOMY TUBE, PERCUTANEOUS N/A 8/30/2017    Procedure: REPLACE GASTROSTOMY TUBE, PERCUTANEOUS;  GJ Tube Change;  Surgeon: Jose Nath PA-C;  [facility]     SPLENECTOMY         Family History   Problem Relation Age of Onset     Hypertension Mother      Diabetes Mother      Osteoperosis Mother      Cancer Father      pancreatic cancer     Diabetes Maternal Grandmother      Cardiovascular Maternal Grandmother      Cancer Maternal Grandfather      lung cancer     Cancer Sister      brain     Cancer Sister      liver cancer       Social History     Social History     Marital status:      Spouse name: N/A     Number of children: N/A     Years of education: N/A     Occupational History     Not on file.     Social History Main Topics     Smoking status: Never Smoker     Smokeless tobacco: Never Used     Alcohol use No     Drug use: No     Sexual activity: Yes     Other Topics Concern     Not on file     Social History Narrative       Current Outpatient Prescriptions   Medication Sig Dispense Refill     ACCU-CHEK MULTICLIX LANCETS MISC by Lancet route. Use to test blood sugar daily or as directed. 102 each prn     acetaminophen 500 MG CAPS Take 1,000 mg by mouth three times a day as needed.       alendronate (FOSAMAX) 70 MG tablet Take 1 tablet (70 mg) by mouth every 7 days On Sundays take first thing in the morning with  plain water and remain upright for at least 30 minutes and until after first food of the day    Do not restart Fosamax (alendronate) until your difficulty swallowing has resolved and you have finished the entire course of fluconazole (Diflucan). 4 tablet 0     alum & mag hydroxide-simethicone (GELUSIL) 200-200-25 MG CHEW chewable tablet CHEW AND SWALLOW 2 CHEWS EVERY 4 HOURS AS NEEDED FOR INDIGESTION 100 tablet 1     amylase-lipase-protease (CREON 12) 08627 UNITS CPEP Take 6 capsules with meals and 3 with snacks.  This is up to 24 capsules per day. 2160 capsule 3     aspirin 81 MG tablet Take 81 mg by mouth daily.       blood glucose monitoring (NOEMI CONTOUR NEXT) test strip Use to test blood sugar 8 times daily. 240 each 11     blood glucose monitoring (NO BRAND SPECIFIED) test strip Use to test blood glucoses 6-8 times per day. 240 each 11     cyclobenzaprine (FLEXERIL) 5 MG tablet Take 1 tablet (5 mg) by mouth 3 times daily as needed for muscle spasms 42 tablet 2     diclofenac (VOLTAREN) 1 % GEL 2 g Apply 2 g to skin four times a day as needed (to affected upper extremity joint(s)). Maximum 8g/day per joint, 16g/day total.       dicyclomine (BENTYL) 10 MG capsule TAKE ONE CAPSULE BY MOUTH EVERY 6 HOURS AS NEEDED 40 capsule 3     docusate sodium (DOCQLACE) 100 MG capsule Take 1 capsule (100 mg) by mouth daily as needed for constipation 90 capsule 1     dronabinol (MARINOL) 2.5 MG capsule Take 2 capsules (5 mg) by mouth 2 times daily as needed 56 capsule 0     DULoxetine (CYMBALTA) 30 MG EC capsule Take 1 capsule (30 mg) by mouth daily With 60mg capsule for total dose of 90mg 90 capsule 1     DULoxetine (CYMBALTA) 60 MG EC capsule Take 1 capsule (60 mg) by mouth daily With 30mg capsule for total dose of 90mg 90 capsule 1     fluconazole (DIFLUCAN) 200 MG tablet Take 2 tabs the first day and 1 tab for the next 13 days 15 tablet 0     furosemide (LASIX) 20 MG tablet Take 1 tablet (20 mg) by mouth 2 times daily 180  tablet 2     hydrocortisone (CORTEF) 10 MG tablet Take 10 mg in the morning and 10 mg at bedtime. Watch for hypoglycemia recurrence. 60 tablet 3     insulin aspart (NOVOLOG VIAL) 100 UNITS/ML VIAL As directed in pump 36 vial prn     insulin pen needle needle RX# [id number]   Pen Needle [facility] 31G UF IV Mini   Use 4-8 needles per day for insulin injections.     2 Box 11     levothyroxine (SYNTHROID/LEVOTHROID) 112 MCG tablet Take 1 tablet (112 mcg) by mouth daily 30 tablet 0     linaclotide (LINZESS) 145 MCG capsule Take 1 capsule (145 mcg) by mouth every morning (before breakfast) 30 capsule 0     metoclopramide (REGLAN) 5 MG tablet Take 2 tablets (10 mg) by mouth 3 times daily (before meals 100 tablet 6     Nutritional Supplements (BOOST HIGH PROTEIN) LIQD Also can use Ensure clear (available over the counter)  0     nystatin (MYCOSTATIN) 24739 unit/0.5mL SUSP Take 1 mL (100,000 Units) by mouth 4 times daily 60 mL 1     ondansetron (ZOFRAN-ODT) 4 MG ODT tab DISSOLVE ONE TABLET ON THE TONGUE EVERY 6 HOURS AS NEEDED FOR NAUSEA AND VOMITING 60 tablet 2     pantoprazole (PROTONIX) 40 MG enteric coated tablet Take 1 tablet (40 mg) by mouth 2 times daily 180 tablet 3     polyethylene glycol (MIRALAX/GLYCOLAX) packet Take 1 packet by mouth 2 times daily as needed for constipation  14 each 5     potassium chloride SA (K-DUR/KLOR-CON M) 20 MEQ CR tablet Take 1 tablet (20 mEq) by mouth daily 90 tablet 1     pregabalin (LYRICA) 150 MG capsule Take 1 capsule (150 mg) by mouth 3 times daily 90 capsule 5     senna (SENNA LAX) 8.6 MG tablet TAKE 1-2 TABLETS BY MOUTH ONCE DAILY AS NEEDED FOR CONSTIPATION 90 tablet 1     senna (SENNA LAX) 8.6 MG tablet Take 1-2 tablets by mouth daily as needed for constipation 90 tablet 1     sodium bicarbonate 650 MG tablet Take one-half tablet (325 mg), via feeding tube every 4 hours. 135 tablet 3     SUMAtriptan (IMITREX) 50 MG tablet Take 1 tablet (50 mg) by mouth at onset of headache for migraine Take  "1 Tab by mouth Once as needed for Migraine Headache. May repeat after two hours.  Maximum dose 200 mg/24 hours. 30 tablet 1     topiramate (TOPAMAX) 100 MG tablet Take 1 tablet (100 mg) by mouth 2 times daily 180 tablet 1     traZODone (DESYREL) 100 MG tablet TAKE 1-2 TABLETS BY MOUTH AN HOUR BEFORE BEDTIME 100 tablet 1     glucagon (GLUCAGON EMERGENCY) 1 MG kit Inject 1 mg into the muscle once for 1 dose 1 mg 1       Allergies   Allergen Reactions     Corticosteroids Other (See Comments)     All oral,IV and injectable steroids are contraindicated (unless in life threatening situations) in Islet Auto transplant recipients. They can cause irreversible loss of islet cell function. Please contact patients transplant care coordinator Malini Julien RN BSN @[phone]/Pager [phone]  and Endocrinologist prior to administration.     Chocolate Flavor Rash     Breaks out when eats chocolate       REVIEW OF SYSTEMS:  CONSTITUTIONAL:  NEGATIVE for fever, chills, change in weight, not feeling tired  SKIN:  NEGATIVE for worrisome rashes, no skin lumps, no skin ulcers and no non-healing wounds  EYES:  NEGATIVE for vision changes or irritation.  ENT/MOUTH:  NEGATIVE.  No hearing loss, no hoarseness, no difficulty swallowing.  RESP:  NEGATIVE. No cough or shortness of breath.  CV:  NEGATIVE for chest pain, palpitations or peripheral edema  GI:  NEGATIVE for nausea, abdominal pain, heartburn, or change in bowel habits  :  Negative. No dysuria, no hematuria  MUSCULOSKELETAL:  See HPI above  NEURO:  NEGATIVE . No headaches, no dizziness,  no numbness  ENDOCRINE:  NEGATIVE for temperature intolerance, skin/hair changes  HEME/ALLERGY/IMMUNE:  NEGATIVE for bleeding problems  PSYCHIATRIC:  NEGATIVE. no anxiety, no depression.      Exam:  Vitals: /75  Resp 18  Ht 1.575 m (5' 2\")  Wt 65.8 kg (145 lb)  BMI 26.52 kg/m2  BMI= Body mass index is 26.52 kg/(m^2).  Constitutional:  healthy, alert and no distress  Neuro: Alert " and Oriented x 3, Gait normal. Sensation grossly WNL.  HEENT:  Atraumatic, EOMI  Neck:  Neck supple with no tenderness.  Psych: Affect normal   Respiratory: Breathing not labored.  Cardiovascular: normal peripheral pulses  Lymph: no adenopathy  Skin: No rashes,worrisome lesions or skin problems  Spine: straight, no straight leg raising pain.  Hips show full range of motion.  There is no tenderness over the sacro-iliac joints, sciatic notch, or greater trochanters.       Again, thank you for allowing me to participate in the care of your patient.        Sincerely,        Kike Otto MD

## 2018-07-06 PROBLEM — M22.42 CHONDROMALACIA OF LEFT PATELLA: Status: ACTIVE | Noted: 2018-07-06

## 2018-07-06 NOTE — PROGRESS NOTES
Visit Date:   07/05/2018      DATE OF VISIT:   07/05/2018.      HISTORY OF PRESENT ILLNESS:  Ms. Kidd is a 52-year-old female seen with left knee pain which is severe and right knee pain which is mild.  She has had this for the last 2 months without specific history of injury.  She has a sharp, shooting, constant pain rated 10/10 at times.  Most pain with walking.  She notes a catching anteriorly in the knee.  Has difficulty pushing it through but can push past resistance.  She does not take anti-inflammatories or other medications for pain due to a pancreatectomy and islet cell transplant.  She has been told to stay away from most medications like Tylenol and ibuprofen.  She has a very complex medical history with chronic pancreatitis,  pancreatectomy, and diabetes that occurred after the pancreatectomy, hypothyroidism, chronic pain syndrome of low back and abdominal pain.        Knees have had x-rays with the left knee x-ray today showing joint space is well preserved.  There was slight irregularity of the deep surface of the patella in the upper 1/3rd.  It appears to be small chips but no displacement.  No other signs of arthritis.      PHYSICAL EXAMINATION:  Shows patellofemoral crepitation on both knees.  She has tenderness anteriorly about the patella.  She also has medial and lateral joint line tenderness on the left.  She had no ligamentous laxity of MCL, LCL or cruciates but has pain with varus and valgus stress.  She has motion from 0-100 degrees.  Had pain with Saw testing only from flexion.  With further twisting and extension, she did not have significant pain on either side.  She had negative Saw on the right.  There is no increased warmth or erythema.  There was no definite effusion.  Sensation and circulation were intact.      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  We have discussed options and will have her work on strengthening, primarily with quadriceps and  hip abduction strengthening.  She could consider glucosamine and chondroitin sulfate.  She reports she is not allowed steroid injections either, so we will see back p.r.n. as there is really nothing short of arthroscopy if conservative treatment does not work.         TIERRA JARVIS MD             D: 2018   T: 2018   MT: SARAH      Name:     MONET ZHANG   MRN:      [id number]        Account:      [id number]   :      1965           Visit Date:   2018      Document: [id number]

## 2018-07-06 NOTE — PROGRESS NOTES
Chantell Kidd is a 52 year old female who is seen as self referral for left knee pain.     Past Medical History:   Diagnosis Date     Chronic abdominal pain      Chronic pancreatitis (H)     S/P pancreatectomy     Depression with anxiety      Diabetes mellitus (H) 1/2012     Gastro-oesophageal reflux disease      Hypothyroidism 4/23/2015     Kidney stones      Low serum cortisol level (H)      Migraines      Other chronic pain     STOMACH     Other chronic pain     LUMBAR SPINE     Spasm of sphincter of Oddi        Past Surgical History:   Procedure Laterality Date     ARTHROPLASTY CARPOMETACARPAL (THUMB JOINT)  5/2/2014    Procedure: ARTHROPLASTY CARPOMETACARPAL (THUMB JOINT);  Surgeon: Carina Panda MD;  Location: [facility]     CHOLECYSTECTOMY  2004     COLONOSCOPY  7/18/2014    Procedure: COLONOSCOPY;  Surgeon: Aurora Sahu MD;  Location: [facility]     COLONOSCOPY N/A 8/1/2017    Procedure: COLONOSCOPY;  Colonoscopy and upper endoscopy;  Surgeon: Deirdre Harris MD;  [facility]: [facility]     ENDOSCOPIC RETROGRADE CHOLANGIOPANCREATOGRAM       ENDOSCOPIC RETROGRADE CHOLANGIOPANCREATOGRAM  4/19/2011    Procedure:ENDOSCOPIC RETROGRADE CHOLANGIOPANCREATOGRAM; Pancreatic Stent Placement       ENDOSCOPIC RETROGRADE CHOLANGIOPANCREATOGRAM  5/26/2011    Procedure:ENDOSCOPIC RETROGRADE CHOLANGIOPANCREATOGRAM; with Pancreatic Stent Removal; Surgeon:DALE MIMS; Location[facility]     ENDOSCOPY UPPER, COLONOSCOPY, COMBINED  4/25/2012    Procedure:COMBINED ENDOSCOPY UPPER, COLONOSCOPY; Enteroscopy with Bile Duct Stent Removal, Colonoscopy  *[facility]*; Surgeon:GRACY GODWIN; [facility]     ESOPHAGOSCOPY, GASTROSCOPY, DUODENOSCOPY (EGD), COMBINED  5/26/2011    Procedure:COMBINED ESOPHAGOSCOPY, GASTROSCOPY, DUODENOSCOPY (EGD); Surgeon:DALE MIMS; [facility]     ESOPHAGOSCOPY, GASTROSCOPY, DUODENOSCOPY (EGD), COMBINED N/A 10/30/2014    Procedure: COMBINED ESOPHAGOSCOPY,  GASTROSCOPY, DUODENOSCOPY (EGD), BIOPSY SINGLE OR MULTIPLE;  Surgeon: Sarai Moon MD;  [facility]     ESOPHAGOSCOPY, GASTROSCOPY, DUODENOSCOPY (EGD), COMBINED Left 7/6/2015    Procedure: COMBINED ESOPHAGOSCOPY, GASTROSCOPY, DUODENOSCOPY (EGD), BIOPSY SINGLE OR MULTIPLE;  Surgeon: Thomas Estrada MD;  [facility]     ESOPHAGOSCOPY, GASTROSCOPY, DUODENOSCOPY (EGD), COMBINED N/A 7/8/2016    Procedure: COMBINED ESOPHAGOSCOPY, GASTROSCOPY, DUODENOSCOPY (EGD), BIOPSY SINGLE OR MULTIPLE;  Surgeon: Eloy Klein MD;  [facility]     ESOPHAGOSCOPY, GASTROSCOPY, DUODENOSCOPY (EGD), COMBINED N/A 8/4/2016    Procedure: COMBINED ESOPHAGOSCOPY, GASTROSCOPY, DUODENOSCOPY (EGD), BIOPSY SINGLE OR MULTIPLE;  Surgeon: Jason Brown MD;  [facility]     ESOPHAGOSCOPY, GASTROSCOPY, DUODENOSCOPY (EGD), COMBINED N/A 8/1/2017    Procedure: COMBINED ESOPHAGOSCOPY, GASTROSCOPY, DUODENOSCOPY (EGD);;  Surgeon: Deirdre Harris MD;  [facility]     GYN SURGERY      Hysterectomy and USO     [facility] ENDOSCOPY W EUS  7/20/2011    Procedure:COMBINED ENDOSCOPIC ULTRASOUND, ESOPHAGOSCOPY, GASTROSCOPY, DUODENOSCOPY (EGD); Surgeon:DRAVIN DONOHUE; [facility]     HERNIORRHAPHY VENTRAL N/A 9/15/2016    Procedure: HERNIORRHAPHY VENTRAL;  Surgeon: Juanita Bernabe MD;  [facility]     HYSTERECTOMY  1997 or 1998    USO     INCISION AND DRAINAGE ABDOMEN WASHOUT, COMBINED  8/16/2012    Procedure: COMBINED INCISION AND DRAINAGE ABDOMEN WASHOUT;  ,debridement and Drainage Post Appendectomy;  Surgeon: Ron Austin MD;  [facility]     INJECT TRANSVERSUS ABDOMINIS PLANE (TAP) BLOCK BILATERAL Bilateral 5/26/2016    Procedure: INJECT TRANSVERSUS ABDOMINIS PLANE (TAP) BLOCK BILATERAL;  Surgeon: Leonard Mccallum MD;  [facility]     LAPAROSCOPIC APPENDECTOMY  7/30/2012    Procedure: LAPAROSCOPIC APPENDECTOMY;  Open Appendectomy;  Surgeon: Ron Austin,  MD;  [facility]     PANCREATECTOMY, TRANSPLANT AUTO ISLET CELL, COMBINED  1/6/2012    Procedure:COMBINED PANCREATECTOMY, TRANSPLANT AUTO ISLET CELL; Total  Pancreatectomy, Auto Islet Transplant, splenectomy, 18fr. transgastric-jejunal feeding tube placement, liver biopsy; Surgeon:PALAK LEE; [facility]     REPLACE GASTROSTOMY TUBE, PERCUTANEOUS N/A 8/30/2017    Procedure: REPLACE GASTROSTOMY TUBE, PERCUTANEOUS;  GJ Tube Change;  Surgeon: Jose Nath PA-C;  [facility]     SPLENECTOMY         Family History   Problem Relation Age of Onset     Hypertension Mother      Diabetes Mother      Osteoperosis Mother      Cancer Father      pancreatic cancer     Diabetes Maternal Grandmother      Cardiovascular Maternal Grandmother      Cancer Maternal Grandfather      lung cancer     Cancer Sister      brain     Cancer Sister      liver cancer       Social History     Social History     Marital status:      Spouse name: N/A     Number of children: N/A     Years of education: N/A     Occupational History     Not on file.     Social History Main Topics     Smoking status: Never Smoker     Smokeless tobacco: Never Used     Alcohol use No     Drug use: No     Sexual activity: Yes     Other Topics Concern     Not on file     Social History Narrative       Current Outpatient Prescriptions   Medication Sig Dispense Refill     ACCU-CHEK MULTICLIX LANCETS MISC by Lancet route. Use to test blood sugar daily or as directed. 102 each prn     acetaminophen 500 MG CAPS Take 1,000 mg by mouth three times a day as needed.       alendronate (FOSAMAX) 70 MG tablet Take 1 tablet (70 mg) by mouth every 7 days On Sundays take first thing in the morning with plain water and remain upright for at least 30 minutes and until after first food of the day    Do not restart Fosamax (alendronate) until your difficulty swallowing has resolved and you have finished the entire course of fluconazole (Diflucan). 4  tablet 0     alum & mag hydroxide-simethicone (GELUSIL) 200-200-25 MG CHEW chewable tablet CHEW AND SWALLOW 2 CHEWS EVERY 4 HOURS AS NEEDED FOR INDIGESTION 100 tablet 1     amylase-lipase-protease (CREON 12) 47475 UNITS CPEP Take 6 capsules with meals and 3 with snacks.  This is up to 24 capsules per day. 2160 capsule 3     aspirin 81 MG tablet Take 81 mg by mouth daily.       blood glucose monitoring (NOEMI CONTOUR NEXT) test strip Use to test blood sugar 8 times daily. 240 each 11     blood glucose monitoring (NO BRAND SPECIFIED) test strip Use to test blood glucoses 6-8 times per day. 240 each 11     cyclobenzaprine (FLEXERIL) 5 MG tablet Take 1 tablet (5 mg) by mouth 3 times daily as needed for muscle spasms 42 tablet 2     diclofenac (VOLTAREN) 1 % GEL 2 g Apply 2 g to skin four times a day as needed (to affected upper extremity joint(s)). Maximum 8g/day per joint, 16g/day total.       dicyclomine (BENTYL) 10 MG capsule TAKE ONE CAPSULE BY MOUTH EVERY 6 HOURS AS NEEDED 40 capsule 3     docusate sodium (DOCQLACE) 100 MG capsule Take 1 capsule (100 mg) by mouth daily as needed for constipation 90 capsule 1     dronabinol (MARINOL) 2.5 MG capsule Take 2 capsules (5 mg) by mouth 2 times daily as needed 56 capsule 0     DULoxetine (CYMBALTA) 30 MG EC capsule Take 1 capsule (30 mg) by mouth daily With 60mg capsule for total dose of 90mg 90 capsule 1     DULoxetine (CYMBALTA) 60 MG EC capsule Take 1 capsule (60 mg) by mouth daily With 30mg capsule for total dose of 90mg 90 capsule 1     fluconazole (DIFLUCAN) 200 MG tablet Take 2 tabs the first day and 1 tab for the next 13 days 15 tablet 0     furosemide (LASIX) 20 MG tablet Take 1 tablet (20 mg) by mouth 2 times daily 180 tablet 2     hydrocortisone (CORTEF) 10 MG tablet Take 10 mg in the morning and 10 mg at bedtime. Watch for hypoglycemia recurrence. 60 tablet 3     insulin aspart (NOVOLOG VIAL) 100 UNITS/ML VIAL As directed in pump 36 vial prn     insulin pen  needle needle RX# [id number]   Pen Needle UC 31G UF IV Mini   Use 4-8 needles per day for insulin injections.     2 Box 11     levothyroxine (SYNTHROID/LEVOTHROID) 112 MCG tablet Take 1 tablet (112 mcg) by mouth daily 30 tablet 0     linaclotide (LINZESS) 145 MCG capsule Take 1 capsule (145 mcg) by mouth every morning (before breakfast) 30 capsule 0     metoclopramide (REGLAN) 5 MG tablet Take 2 tablets (10 mg) by mouth 3 times daily (before meals 100 tablet 6     Nutritional Supplements (BOOST HIGH PROTEIN) LIQD Also can use Ensure clear (available over the counter)  0     nystatin (MYCOSTATIN) 94808 unit/0.5mL SUSP Take 1 mL (100,000 Units) by mouth 4 times daily 60 mL 1     ondansetron (ZOFRAN-ODT) 4 MG ODT tab DISSOLVE ONE TABLET ON THE TONGUE EVERY 6 HOURS AS NEEDED FOR NAUSEA AND VOMITING 60 tablet 2     pantoprazole (PROTONIX) 40 MG enteric coated tablet Take 1 tablet (40 mg) by mouth 2 times daily 180 tablet 3     polyethylene glycol (MIRALAX/GLYCOLAX) packet Take 1 packet by mouth 2 times daily as needed for constipation  14 each 5     potassium chloride SA (K-DUR/KLOR-CON M) 20 MEQ CR tablet Take 1 tablet (20 mEq) by mouth daily 90 tablet 1     pregabalin (LYRICA) 150 MG capsule Take 1 capsule (150 mg) by mouth 3 times daily 90 capsule 5     senna (SENNA LAX) 8.6 MG tablet TAKE 1-2 TABLETS BY MOUTH ONCE DAILY AS NEEDED FOR CONSTIPATION 90 tablet 1     senna (SENNA LAX) 8.6 MG tablet Take 1-2 tablets by mouth daily as needed for constipation 90 tablet 1     sodium bicarbonate 650 MG tablet Take one-half tablet (325 mg), via feeding tube every 4 hours. 135 tablet 3     SUMAtriptan (IMITREX) 50 MG tablet Take 1 tablet (50 mg) by mouth at onset of headache for migraine Take 1 Tab by mouth Once as needed for Migraine Headache. May repeat after two hours.  Maximum dose 200 mg/24 hours. 30 tablet 1     topiramate (TOPAMAX) 100 MG tablet Take 1 tablet (100 mg) by mouth 2 times daily 180 tablet 1     traZODone  "(DESYREL) 100 MG tablet TAKE 1-2 TABLETS BY MOUTH AN HOUR BEFORE BEDTIME 100 tablet 1     glucagon (GLUCAGON EMERGENCY) 1 MG kit Inject 1 mg into the muscle once for 1 dose 1 mg 1       Allergies   Allergen Reactions     Corticosteroids Other (See Comments)     All oral,IV and injectable steroids are contraindicated (unless in life threatening situations) in Islet Auto transplant recipients. They can cause irreversible loss of islet cell function. Please contact patients transplant care coordinator Malini Julien RN BSN @[phone]/Pager [phone]  and Endocrinologist prior to administration.     Chocolate Flavor Rash     Breaks out when eats chocolate       REVIEW OF SYSTEMS:  CONSTITUTIONAL:  NEGATIVE for fever, chills, change in weight, not feeling tired  SKIN:  NEGATIVE for worrisome rashes, no skin lumps, no skin ulcers and no non-healing wounds  EYES:  NEGATIVE for vision changes or irritation.  ENT/MOUTH:  NEGATIVE.  No hearing loss, no hoarseness, no difficulty swallowing.  RESP:  NEGATIVE. No cough or shortness of breath.  CV:  NEGATIVE for chest pain, palpitations or peripheral edema  GI:  NEGATIVE for nausea, abdominal pain, heartburn, or change in bowel habits  :  Negative. No dysuria, no hematuria  MUSCULOSKELETAL:  See HPI above  NEURO:  NEGATIVE . No headaches, no dizziness,  no numbness  ENDOCRINE:  NEGATIVE for temperature intolerance, skin/hair changes  HEME/ALLERGY/IMMUNE:  NEGATIVE for bleeding problems  PSYCHIATRIC:  NEGATIVE. no anxiety, no depression.      Exam:  Vitals: /75  Resp 18  Ht 1.575 m (5' 2\")  Wt 65.8 kg (145 lb)  BMI 26.52 kg/m2  BMI= Body mass index is 26.52 kg/(m^2).  Constitutional:  healthy, alert and no distress  Neuro: Alert and Oriented x 3, Gait normal. Sensation grossly WNL.  HEENT:  Atraumatic, EOMI  Neck:  Neck supple with no tenderness.  Psych: Affect normal   Respiratory: Breathing not labored.  Cardiovascular: normal peripheral pulses  Lymph: no " adenopathy  Skin: No rashes,worrisome lesions or skin problems  Spine: straight, no straight leg raising pain.  Hips show full range of motion.  There is no tenderness over the sacro-iliac joints, sciatic notch, or greater trochanters.

## 2018-07-17 NOTE — TELEPHONE ENCOUNTER
I received refill requests for donabinol from the [facility] pharmacy for this patient.    Last rx was written 4/17/18 as patient had an upcoming appointment on 4/30/18. Patient no-showed appointment. Also no-showed appointments on 1/29/18 at 7/12/18.    Last appointment with PCP 12/2016. Last appointment in primary care 11/2017 with Dr. Mccormick.     verified today, last dronabinol fill date 4/20/18. Fill dates of #56 capsules on 4/20/18.     I called Chantell and spoke to her today regarding her refill request. PCP is out of the clinic until August. Informed her I am unsure of appropriateness of refill through a covering provider as she has no-showed last 3 appointments, has not seen PCP since 12/2016. Patient saw Dr. Mccormick for a hospital follow up only. Chantell stated she had unreliable transportation in the past, her daughter would take her to appointment and sometimes just not show up. She stated she will be able to make her future appointment on 8/21/18. I stressed the importance of calling the clinic to cancel if she cannot make appointments, she voiced understanding.    I then spoke to Chantell about medication usage. Appears she does not use this daily as last filled in April. Stated that with lack of follow up as well as not using daily, I am unsure if this is appropriate for a covering provider to fill.  Chantell stated that she does use the medication daily and not intermittently. She stated she was just given a refill a month ago, and that she just ran out of this medication on Friday. Reports that she only uses [facility] pharmacy. I informed her I do not have record of Dr. Morgan filling recently, last time he filled was in April. I also do not see record of a recent fill. I will also call [facility] to confirm.    I spoke with [facility] pharmacy staff at [phone]. Staff confirmed that medication has only been for #56 on 4/20/17 and on 10/31/17 in the past year. Staff verified that even though  states fill on  4/19/18 and 4/20/18, med was only dispensed on 4/20/18 for #56 capsules.    Even with one time daily use, patient would have exhausted medication in mid June. No record of a fill one month ago, either from Dr. Morgan, , or pharmacy.    I spoke with Chantell ~1800, advised her to follow up in clinic.    Jahaira Mi RN

## 2018-08-26 ENCOUNTER — APPOINTMENT (OUTPATIENT)
Dept: CT IMAGING | Facility: CLINIC | Age: 53
DRG: 918 | End: 2018-08-26
Attending: EMERGENCY MEDICINE
Payer: MEDICARE

## 2018-08-26 ENCOUNTER — HOSPITAL ENCOUNTER (INPATIENT)
Facility: CLINIC | Age: 53
LOS: 2 days | Discharge: HOME | DRG: 918 | End: 2018-08-29
Attending: EMERGENCY MEDICINE | Admitting: INTERNAL MEDICINE
Payer: MEDICARE

## 2018-08-26 DIAGNOSIS — E10.649 TYPE 1 DIABETES MELLITUS WITH HYPOGLYCEMIA AND WITHOUT COMA (H): ICD-10-CM

## 2018-08-26 DIAGNOSIS — Z79.4 ENCOUNTER FOR LONG-TERM (CURRENT) USE OF INSULIN (H): ICD-10-CM

## 2018-08-26 DIAGNOSIS — E10.641 TYPE 1 DIABETES MELLITUS WITH HYPOGLYCEMIC COMA (H): ICD-10-CM

## 2018-08-26 DIAGNOSIS — Z90.410 POST-PANCREATECTOMY DIABETES (H): ICD-10-CM

## 2018-08-26 DIAGNOSIS — E16.2 HYPOGLYCEMIA: ICD-10-CM

## 2018-08-26 DIAGNOSIS — E89.1 POST-PANCREATECTOMY DIABETES (H): ICD-10-CM

## 2018-08-26 DIAGNOSIS — E13.9 POST-PANCREATECTOMY DIABETES (H): ICD-10-CM

## 2018-08-26 DIAGNOSIS — E10.649 HYPOGLYCEMIA UNAWARENESS IN TYPE 1 DIABETES MELLITUS (H): ICD-10-CM

## 2018-08-26 LAB
ALBUMIN SERPL-MCNC: 3.7 G/DL (ref 3.4–5)
ALCOHOL BREATH TEST: 0.19 (ref 0–0.01)
ALP SERPL-CCNC: 106 U/L (ref 40–150)
ALT SERPL W P-5'-P-CCNC: 43 U/L (ref 0–50)
ANION GAP SERPL CALCULATED.3IONS-SCNC: 12 MMOL/L (ref 3–14)
AST SERPL W P-5'-P-CCNC: 35 U/L (ref 0–45)
BASOPHILS # BLD AUTO: 0 10E9/L (ref 0–0.2)
BASOPHILS NFR BLD AUTO: 0.2 %
BILIRUB SERPL-MCNC: 0.2 MG/DL (ref 0.2–1.3)
BUN SERPL-MCNC: 7 MG/DL (ref 7–30)
CALCIUM SERPL-MCNC: 8.3 MG/DL (ref 8.5–10.1)
CHLORIDE SERPL-SCNC: 107 MMOL/L (ref 94–109)
CO2 SERPL-SCNC: 22 MMOL/L (ref 20–32)
CREAT SERPL-MCNC: 0.59 MG/DL (ref 0.52–1.04)
DIFFERENTIAL METHOD BLD: ABNORMAL
EOSINOPHIL # BLD AUTO: 0.1 10E9/L (ref 0–0.7)
EOSINOPHIL NFR BLD AUTO: 0.3 %
ERYTHROCYTE [DISTWIDTH] IN BLOOD BY AUTOMATED COUNT: 12.9 % (ref 10–15)
GFR SERPL CREATININE-BSD FRML MDRD: >90 ML/MIN/1.7M2
GLUCOSE BLDC GLUCOMTR-MCNC: 133 MG/DL (ref 70–99)
GLUCOSE SERPL-MCNC: 69 MG/DL (ref 70–99)
HCT VFR BLD AUTO: 35 % (ref 35–47)
HGB BLD-MCNC: 11.8 G/DL (ref 11.7–15.7)
IMM GRANULOCYTES # BLD: 0 10E9/L (ref 0–0.4)
IMM GRANULOCYTES NFR BLD: 0.2 %
LACTATE BLD-SCNC: 3.8 MMOL/L (ref 0.7–2)
LIPASE SERPL-CCNC: 36 U/L (ref 73–393)
LYMPHOCYTES # BLD AUTO: 2.6 10E9/L (ref 0.8–5.3)
LYMPHOCYTES NFR BLD AUTO: 13.8 %
MCH RBC QN AUTO: 30.4 PG (ref 26.5–33)
MCHC RBC AUTO-ENTMCNC: 33.7 G/DL (ref 31.5–36.5)
MCV RBC AUTO: 90 FL (ref 78–100)
MONOCYTES # BLD AUTO: 1 10E9/L (ref 0–1.3)
MONOCYTES NFR BLD AUTO: 5.1 %
NEUTROPHILS # BLD AUTO: 15 10E9/L (ref 1.6–8.3)
NEUTROPHILS NFR BLD AUTO: 80.4 %
NRBC # BLD AUTO: 0 10*3/UL
NRBC BLD AUTO-RTO: 0 /100
PLATELET # BLD AUTO: 318 10E9/L (ref 150–450)
PLATELET # BLD EST: ABNORMAL 10*3/UL
POTASSIUM SERPL-SCNC: 2.5 MMOL/L (ref 3.4–5.3)
PROT SERPL-MCNC: 7.2 G/DL (ref 6.8–8.8)
RBC # BLD AUTO: 3.88 10E12/L (ref 3.8–5.2)
RBC MORPH BLD: NORMAL
SODIUM SERPL-SCNC: 140 MMOL/L (ref 133–144)
WBC # BLD AUTO: 18.6 10E9/L (ref 4–11)

## 2018-08-26 PROCEDURE — 83690 ASSAY OF LIPASE: CPT | Performed by: EMERGENCY MEDICINE

## 2018-08-26 PROCEDURE — 85025 COMPLETE CBC W/AUTO DIFF WBC: CPT | Performed by: EMERGENCY MEDICINE

## 2018-08-26 PROCEDURE — 99285 EMERGENCY DEPT VISIT HI MDM: CPT | Mod: Z6 | Performed by: EMERGENCY MEDICINE

## 2018-08-26 PROCEDURE — 25000128 H RX IP 250 OP 636: Performed by: EMERGENCY MEDICINE

## 2018-08-26 PROCEDURE — 25800025 ZZH RX 258

## 2018-08-26 PROCEDURE — 83605 ASSAY OF LACTIC ACID: CPT | Performed by: EMERGENCY MEDICINE

## 2018-08-26 PROCEDURE — 00000146 ZZHCL STATISTIC GLUCOSE BY METER IP

## 2018-08-26 PROCEDURE — 96361 HYDRATE IV INFUSION ADD-ON: CPT | Performed by: EMERGENCY MEDICINE

## 2018-08-26 PROCEDURE — 81001 URINALYSIS AUTO W/SCOPE: CPT | Performed by: EMERGENCY MEDICINE

## 2018-08-26 PROCEDURE — 74177 CT ABD & PELVIS W/CONTRAST: CPT

## 2018-08-26 PROCEDURE — 80053 COMPREHEN METABOLIC PANEL: CPT | Performed by: EMERGENCY MEDICINE

## 2018-08-26 PROCEDURE — 99285 EMERGENCY DEPT VISIT HI MDM: CPT | Mod: 25 | Performed by: EMERGENCY MEDICINE

## 2018-08-26 RX ORDER — DEXTROSE MONOHYDRATE 100 MG/ML
INJECTION, SOLUTION INTRAVENOUS
Status: COMPLETED
Start: 2018-08-26 | End: 2018-08-26

## 2018-08-26 RX ORDER — POTASSIUM CL/LIDO/0.9 % NACL 10MEQ/0.1L
10 INTRAVENOUS SOLUTION, PIGGYBACK (ML) INTRAVENOUS
Status: DISCONTINUED | OUTPATIENT
Start: 2018-08-26 | End: 2018-08-26

## 2018-08-26 RX ORDER — DEXTROSE MONOHYDRATE 25 G/50ML
25 INJECTION, SOLUTION INTRAVENOUS ONCE
Status: COMPLETED | OUTPATIENT
Start: 2018-08-26 | End: 2018-08-26

## 2018-08-26 RX ORDER — DEXTROSE MONOHYDRATE 100 MG/ML
INJECTION, SOLUTION INTRAVENOUS ONCE
Status: COMPLETED | OUTPATIENT
Start: 2018-08-26 | End: 2018-08-27

## 2018-08-26 RX ORDER — DEXTROSE MONOHYDRATE 25 G/50ML
INJECTION, SOLUTION INTRAVENOUS
Status: COMPLETED
Start: 2018-08-26 | End: 2018-08-26

## 2018-08-26 RX ORDER — IOPAMIDOL 755 MG/ML
83 INJECTION, SOLUTION INTRAVASCULAR ONCE
Status: COMPLETED | OUTPATIENT
Start: 2018-08-26 | End: 2018-08-26

## 2018-08-26 RX ORDER — POTASSIUM CHLORIDE 7.45 MG/ML
10 INJECTION INTRAVENOUS ONCE
Status: COMPLETED | OUTPATIENT
Start: 2018-08-26 | End: 2018-08-27

## 2018-08-26 RX ADMIN — IOPAMIDOL 83 ML: 755 INJECTION, SOLUTION INTRAVENOUS at 23:50

## 2018-08-26 RX ADMIN — SODIUM CHLORIDE, PRESERVATIVE FREE 75 ML: 5 INJECTION INTRAVENOUS at 23:51

## 2018-08-26 RX ADMIN — DEXTROSE MONOHYDRATE: 100 INJECTION, SOLUTION INTRAVENOUS at 23:25

## 2018-08-26 RX ADMIN — DEXTROSE MONOHYDRATE 25 ML: 500 INJECTION PARENTERAL at 23:25

## 2018-08-26 RX ADMIN — DEXTROSE MONOHYDRATE 25 ML: 25 INJECTION, SOLUTION INTRAVENOUS at 23:25

## 2018-08-26 ASSESSMENT — ENCOUNTER SYMPTOMS
SHORTNESS OF BREATH: 0
FEVER: 0
ABDOMINAL PAIN: 1

## 2018-08-26 NOTE — IP AVS SNAPSHOT
MRN:[id number]                      After Visit Summary   8/26/2018    Chantell Kidd    MRN: [id number]           Thank you!     Thank you for choosing [facility] for your care. Our goal is always to provide you with excellent care. Hearing back from our patients is one way we can continue to improve our services. Please take a few minutes to complete the written survey that you may receive in the mail after you visit with us. Thank you!        Patient Information     Date Of Birth          1965        Designated Caregiver       Most Recent Value    Caregiver    Will someone help with your care after discharge? yes    Name of designated caregiver Yannick Kidd    Phone number of caregiver [phone]    Caregiver address [address] [place] [address]      About your hospital stay     You were admitted on:  August 27, 2018 You last received care in the:  [facility]    You were discharged on:  August 29, 2018        Reason for your hospital stay       You were hospitalized for low blood sugars in the setting of too much insulin from your pump combined with poor oral intake. It is likely you had stomach/digestive inflammation that was decreasing your oral intake. You were treated with IV dextrose and encouraged to keep up your oral intake of food.                  Who to Call     For medical emergencies, please call 911.  For non-urgent questions about your medical care, please call your primary care provider or clinic, [phone]          Attending Provider     Provider Specialty    Jerry Rivera MD Emergency Medicine    Kaye Conley MD Internal Medicine    Tammie Ratliff MD Internal Medicine    Waldemar Hanley MD Internal Medicine       Primary Care Provider Office Phone # Fax #    Ron Morgan [phone]       When to contact your care team       Call your primary doctor if you have any of the following: temperature greater  than 100.4 or less than 95 or blood sugars greater than 200 or less than 60.                  After Care Instructions     Activity       Your activity upon discharge: activity as tolerated            Diet       Follow this diet upon discharge: Orders Placed This Encounter      Consistent Carbohydrate Diet 0010-1133 Calories: Moderate Consistent CHO (4-6 CHO units/meal)            Discharge Instructions       Do NOT reattach your pump. Do not continue to take insulin. Continue to test your blood sugar as normal and work with your endocrinology team to determine when to restart your pump. Continue taking your steroids as previously prescribed (10mg BID).                  Follow-up Appointments     Adult [facility] Follow-up and recommended labs and tests       Follow up with Dr. Maher , at (location with clinic name or city) [facility], within 1-2 weeks  to evaluate medication change and for hospital follow- up. The following labs/tests are recommended: Blood sugar check.    Appointments on [place] and/or [facility] (with [facility] or [facility] provider or service). Call [phone] if you haven't heard regarding these appointments within 7 days of discharge.                  Your next 10 appointments already scheduled     Oct 04, 2018 10:20 AM CDT   (Arrive by 10:05 AM)   Return Visit with Ron Morgan MD   [facility] ([facility])    [address]  4th Floor  [facility] [address] [phone]            Nov 15, 2018  9:20 AM CST   (Arrive by 9:05 AM)   Return [place] with Amena Maher MD   [facility] ([facility])    [address] [place] [address]  [address]  [facility] [id number] [phone]              Pending Results     Date and Time Order Name Status Description    8/27/2018 0856 Blood culture Preliminary     8/27/2018 0856 Blood culture Preliminary             Statement of Approval     Ordered     "      08/29/18 1632  I have reviewed and agree with all the recommendations and orders detailed in this document.  EFFECTIVE NOW     Approved and electronically signed by:  Sonia Severino MD             Admission Information     Date & Time Provider Department Dept. Phone    8/26/2018 Waldemar Hanley MD [address] [facility] [phone]      Your Vitals Were     Blood Pressure Pulse Temperature Respirations Height Weight    134/81 (BP Location: Right arm) 87 99.1  F (37.3  C) (Oral) 16 1.575 m (5' 2\") 65.9 kg (145 lb 3.2 oz)    Pulse Oximetry BMI (Body Mass Index)                95% 26.56 kg/m2          MyChart Information     [facility] gives you secure access to your electronic health record. If you see a primary care provider, you can also send messages to your care team and make appointments. If you have questions, please call your primary care clinic.  If you do not have a primary care provider, please call [phone] and they will assist you.        Care EveryWhere ID     This is your Care EveryWhere ID. This could be used by other organizations to access your [facility] medical records  [id number]        Equal Access to Services     BRIDGETTE JOHNSON : Hadii jose maria Shoemaker, waurmilada hernandez, qaybta miltonaldionte azar, bambi flowers. So [facility] [phone].    ATENCIÓN: Si habla español, tiene a webb disposición servicios gratuitos de asistencia lingüística. Llame al [phone].    We comply with applicable federal civil rights laws and [place] laws. We do not discriminate on the basis of race, color, national origin, age, disability, sex, sexual orientation, or gender identity.               Review of your medicines      CONTINUE these medicines which have NOT CHANGED        Dose / Directions    acetaminophen 500 MG Caps        Take 1,000 mg by mouth three times a day as needed.   Refills:  0       alendronate 70 MG tablet   Commonly known as:  FOSAMAX   Used for:  Osteoporosis "        Dose:  70 mg   Take 1 tablet (70 mg) by mouth every 7 days On Sundays take first thing in the morning with plain water and remain upright for at least 30 minutes and until after first food of the day  Do not restart Fosamax (alendronate) until your difficulty swallowing has resolved and you have finished the entire course of fluconazole (Diflucan).   Quantity:  4 tablet   Refills:  0       alum & mag hydroxide-simethicone 200-200-25 MG Chew chewable tablet   Commonly known as:  MYLANTA/MAALOX        Dose:  1 tablet   Take 1 tablet by mouth 3 times daily as needed for indigestion   Refills:  0       amylase-lipase-protease 15745 units Cpep   Commonly known as:  CREON 12        Take 6 to 8 capsules by mouth with meals and take 4 capsules with snacks   Refills:  0       aspirin 81 MG tablet        Take 81 mg by mouth daily.   Refills:  0       blood glucose monitoring lancets   Used for:  Chronic abdominal pain        by Lancet route. Use to test blood sugar daily or as directed.   Quantity:  102 each   Refills:  prn       * blood glucose monitoring test strip   Commonly known as:  no brand specified   Used for:  Post-pancreatectomy diabetes (H)        Use to test blood glucoses 6-8 times per day.   Quantity:  240 each   Refills:  11       * blood glucose monitoring test strip   Commonly known as:  NOEMI CONTOUR NEXT   Used for:  Post-pancreatectomy diabetes (H)        Use to test blood sugar 8 times daily.   Quantity:  240 each   Refills:  11       BOOST HIGH PROTEIN Liqd   Used for:  Pancreatic insufficiency        Also can use Ensure clear (available over the counter)   Refills:  0       cyclobenzaprine 5 MG tablet   Commonly known as:  FLEXERIL        Dose:  10 mg   Take 10 mg by mouth 2 times daily as needed for muscle spasms   Refills:  0       diclofenac 1 % Gel topical gel   Commonly known as:  VOLTAREN        Dose:  2 g   2 g Apply 2 g to skin four times a day as needed (to affected upper extremity  joint(s)). Maximum 8g/day per joint, 16g/day total.   Refills:  0       dicyclomine 10 MG capsule   Commonly known as:  BENTYL   Used for:  Abdominal pain, epigastric        TAKE ONE CAPSULE BY MOUTH EVERY 6 HOURS AS NEEDED   Quantity:  40 capsule   Refills:  3       dronabinol 2.5 MG capsule   Commonly known as:  MARINOL   Used for:  Routine health maintenance        Dose:  5 mg   Take 2 capsules (5 mg) by mouth 2 times daily as needed   Quantity:  56 capsule   Refills:  0       * DULoxetine 30 MG EC capsule   Commonly known as:  CYMBALTA   Used for:  Major depressive disorder, recurrent episode, moderate (H), CIERRA (generalized anxiety disorder)        Dose:  30 mg   Take 1 capsule (30 mg) by mouth daily With 60mg capsule for total dose of 90mg   Quantity:  90 capsule   Refills:  1       * DULoxetine 60 MG EC capsule   Commonly known as:  CYMBALTA   Used for:  Major depressive disorder, recurrent episode, moderate (H), CIERRA (generalized anxiety disorder)        Dose:  60 mg   Take 1 capsule (60 mg) by mouth daily With 30mg capsule for total dose of 90mg   Quantity:  90 capsule   Refills:  1       fluconazole 200 MG tablet   Commonly known as:  DIFLUCAN        Take 2 tabs the first day and 1 tab for the next 13 days   Quantity:  15 tablet   Refills:  0       furosemide 20 MG tablet   Commonly known as:  LASIX   Used for:  Edema, unspecified type        Dose:  20 mg   Take 1 tablet (20 mg) by mouth 2 times daily   Quantity:  180 tablet   Refills:  2       glucagon 1 MG kit   Commonly known as:  GLUCAGON EMERGENCY   Used for:  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 Watch for hypoglycemia  recurrence.   Quantity:  60 tablet   Refills:  3       insulin pen needle 31G X 5 MM   Used for:  Chronic abdominal pain        RX# [id number]  Pen Needle UC 31G UF IV Mini  Use 4-8 needles per day for insulin injections.   Quantity:  2 Box   Refills:  11       levothyroxine 112 MCG tablet   Commonly known as:  SYNTHROID/LEVOTHROID   Used for:  Hypothyroidism        Dose:  112 mcg   Take 1 tablet (112 mcg) by mouth daily   Quantity:  30 tablet   Refills:  0       linaclotide 145 MCG capsule   Commonly known as:  LINZESS   Used for:  Constipation, unspecified constipation type, Chronic back pain, unspecified back location, unspecified back pain laterality, Physical deconditioning, Primary insomnia        Dose:  145 mcg   Take 1 capsule (145 mcg) by mouth every morning (before breakfast)   Quantity:  30 capsule   Refills:  0       metoclopramide 5 MG tablet   Commonly known as:  REGLAN        Dose:  5 mg   Take 5 mg by mouth 2 times daily as needed   Refills:  0       nystatin 062716 unit/mL Susp suspension   Commonly known as:  MYCOSTATIN   Used for:  Odynophagia        Dose:  827092 Units   Take 1 mL (100,000 Units) by mouth 4 times daily   Quantity:  60 mL   Refills:  1       ondansetron 4 MG ODT tab   Commonly known as:  ZOFRAN-ODT   Used for:  Nausea        DISSOLVE ONE TABLET ON THE TONGUE EVERY 6 HOURS AS NEEDED FOR NAUSEA AND VOMITING   Quantity:  60 tablet   Refills:  2       pantoprazole 40 MG EC tablet   Commonly known as:  PROTONIX   Used for:  H. pylori infection        Dose:  40 mg   Take 1 tablet (40 mg) by mouth 2 times daily   Quantity:  180 tablet   Refills:  3       polyethylene glycol Packet   Commonly known as:  MIRALAX/GLYCOLAX   Used for:  Chronic constipation        Dose:  1 packet   Take 1 packet by mouth 2 times daily as needed for constipation   Quantity:  14 each   Refills:  5       potassium chloride SA 20 MEQ CR tablet   Commonly known as:  K-DUR/KLOR-CON M   Used for:  Hypokalemia         Dose:  20 mEq   Take 1 tablet (20 mEq) by mouth daily   Quantity:  90 tablet   Refills:  1       pregabalin 150 MG capsule   Commonly known as:  LYRICA   Used for:  Chronic generalized abdominal pain        Dose:  150 mg   Take 1 capsule (150 mg) by mouth 3 times daily   Quantity:  90 capsule   Refills:  5       senna-docusate 8.6-50 MG per tablet   Commonly known as:  SENOKOT-S;PERICOLACE        Dose:  2 tablet   Take 2 tablets by mouth daily   Refills:  0       SUMAtriptan 50 MG tablet   Commonly known as:  IMITREX   Used for:  Migraine        Dose:  50 mg   Take 1 tablet (50 mg) by mouth at onset of headache for migraine Take 1 Tab by mouth Once as needed for Migraine Headache. May repeat after two hours.  Maximum dose 200 mg/24 hours.   Quantity:  30 tablet   Refills:  1       topiramate 100 MG tablet   Commonly known as:  TOPAMAX   Used for:  Migraine, unspecified, not intractable, without status migrainosus        Dose:  100 mg   Take 1 tablet (100 mg) by mouth 2 times daily   Quantity:  180 tablet   Refills:  1       traZODone 100 MG tablet   Commonly known as:  DESYREL   Used for:  Primary insomnia, Constipation, unspecified constipation type, Chronic back pain, unspecified back location, unspecified back pain laterality, Physical deconditioning        TAKE 1-2 TABLETS BY MOUTH AN HOUR BEFORE BEDTIME   Quantity:  100 tablet   Refills:  1       * Notice:  This list has 4 medication(s) that are the same as other medications prescribed for you. Read the directions carefully, and ask your doctor or other care provider to review them with you.      STOP taking     insulin aspart 100 UNITS/ML injection   Commonly known as:  NovoLOG VIAL                Where to get your medicines      These medications were sent to [facility] - [place], [place] - [address]  [address] [address]    Hours:  test Rx sent successfully 12/26/02  [facility] Phone:  [phone]     glucagon 1 MG kit                 Protect others around you: Learn how to safely use, store and throw away your medicines at www.disposemymeds.org.             Medication List: This is a list of all your medications and when to take them. Check marks below indicate your daily home schedule. Keep this list as a reference.      Medications           Morning Afternoon Evening Bedtime As Needed    acetaminophen 500 MG Caps   Take 1,000 mg by mouth three times a day as needed.                                alendronate 70 MG tablet   Commonly known as:  FOSAMAX   Take 1 tablet (70 mg) by mouth every 7 days On Sundays take first thing in the morning with plain water and remain upright for at least 30 minutes and until after first food of the day  Do not restart Fosamax (alendronate) until your difficulty swallowing has resolved and you have finished the entire course of fluconazole (Diflucan).                                alum & mag hydroxide-simethicone 200-200-25 MG Chew chewable tablet   Commonly known as:  MYLANTA/MAALOX   Take 1 tablet by mouth 3 times daily as needed for indigestion                                amylase-lipase-protease 44897 units Cpep   Commonly known as:  CREON 12   Take 6 to 8 capsules by mouth with meals and take 4 capsules with snacks   Last time this was given:  72,000 Units on 8/29/2018  1:10 PM                                aspirin 81 MG tablet   Take 81 mg by mouth daily.                                blood glucose monitoring lancets   by Lancet route. Use to test blood sugar daily or as directed.                                * blood glucose monitoring test strip   Commonly known as:  no brand specified   Use to test blood glucoses 6-8 times per day.                                * blood glucose monitoring test strip   Commonly known as:  NOEMI CONTOUR NEXT   Use to test blood sugar 8 times daily.                                BOOST HIGH PROTEIN Liqd   Also can use Ensure clear (available over the counter)                                 cyclobenzaprine 5 MG tablet   Commonly known as:  FLEXERIL   Take 10 mg by mouth 2 times daily as needed for muscle spasms                                diclofenac 1 % Gel topical gel   Commonly known as:  VOLTAREN   2 g Apply 2 g to skin four times a day as needed (to affected upper extremity joint(s)). Maximum 8g/day per joint, 16g/day total.   Last time this was given:  2 g on 8/28/2018  8:05 PM                                dicyclomine 10 MG capsule   Commonly known as:  BENTYL   TAKE ONE CAPSULE BY MOUTH EVERY 6 HOURS AS NEEDED   Last time this was given:  10 mg on 8/29/2018  8:30 AM                                dronabinol 2.5 MG capsule   Commonly known as:  MARINOL   Take 2 capsules (5 mg) by mouth 2 times daily as needed   Last time this was given:  5 mg on 8/29/2018  8:24 AM                                * DULoxetine 30 MG EC capsule   Commonly known as:  CYMBALTA   Take 1 capsule (30 mg) by mouth daily With 60mg capsule for total dose of 90mg   Last time this was given:  60 mg on 8/29/2018  8:26 AM                                * DULoxetine 60 MG EC capsule   Commonly known as:  CYMBALTA   Take 1 capsule (60 mg) by mouth daily With 30mg capsule for total dose of 90mg   Last time this was given:  60 mg on 8/29/2018  8:26 AM                                fluconazole 200 MG tablet   Commonly known as:  DIFLUCAN   Take 2 tabs the first day and 1 tab for the next 13 days                                furosemide 20 MG tablet   Commonly known as:  LASIX   Take 1 tablet (20 mg) by mouth 2 times daily                                glucagon 1 MG kit   Commonly known as:  GLUCAGON EMERGENCY   Inject 1 mg into the muscle once for 1 dose                                hydrocortisone 10 MG tablet   Commonly known as:  CORTEF   Take 10 mg in the morning and 10 mg at bedtime. Watch for hypoglycemia recurrence.   Last time this was given:  20 mg on 8/29/2018  8:24 AM                                 insulin pen needle 31G X 5 MM   RX# [id number]  Pen Needle UC 31G UF IV Mini  Use 4-8 needles per day for insulin injections.                                levothyroxine 112 MCG tablet   Commonly known as:  SYNTHROID/LEVOTHROID   Take 1 tablet (112 mcg) by mouth daily   Last time this was given:  112 mcg on 8/29/2018  8:30 AM                                linaclotide 145 MCG capsule   Commonly known as:  LINZESS   Take 1 capsule (145 mcg) by mouth every morning (before breakfast)   Last time this was given:  145 mcg on 8/29/2018  8:30 AM                                metoclopramide 5 MG tablet   Commonly known as:  REGLAN   Take 5 mg by mouth 2 times daily as needed                                nystatin 548398 unit/mL Susp suspension   Commonly known as:  MYCOSTATIN   Take 1 mL (100,000 Units) by mouth 4 times daily   Last time this was given:  100,000 Units on 8/29/2018  4:48 PM                                ondansetron 4 MG ODT tab   Commonly known as:  ZOFRAN-ODT   DISSOLVE ONE TABLET ON THE TONGUE EVERY 6 HOURS AS NEEDED FOR NAUSEA AND VOMITING   Last time this was given:  4 mg on 8/28/2018  6:43 AM                                pantoprazole 40 MG EC tablet   Commonly known as:  PROTONIX   Take 1 tablet (40 mg) by mouth 2 times daily   Last time this was given:  40 mg on 8/29/2018  8:26 AM                                polyethylene glycol Packet   Commonly known as:  MIRALAX/GLYCOLAX   Take 1 packet by mouth 2 times daily as needed for constipation                                potassium chloride SA 20 MEQ CR tablet   Commonly known as:  K-DUR/KLOR-CON M   Take 1 tablet (20 mEq) by mouth daily   Last time this was given:  20 mEq on 8/27/2018  2:50 PM                                pregabalin 150 MG capsule   Commonly known as:  LYRICA   Take 1 capsule (150 mg) by mouth 3 times daily   Last time this was given:  150 mg on 8/29/2018  1:10 PM                                senna-docusate 8.6-50 MG  per tablet   Commonly known as:  SENOKOT-S;PERICOLACE   Take 2 tablets by mouth daily                                SUMAtriptan 50 MG tablet   Commonly known as:  IMITREX   Take 1 tablet (50 mg) by mouth at onset of headache for migraine Take 1 Tab by mouth Once as needed for Migraine Headache. May repeat after two hours.  Maximum dose 200 mg/24 hours.                                topiramate 100 MG tablet   Commonly known as:  TOPAMAX   Take 1 tablet (100 mg) by mouth 2 times daily   Last time this was given:  100 mg on 8/29/2018  8:26 AM                                traZODone 100 MG tablet   Commonly known as:  DESYREL   TAKE 1-2 TABLETS BY MOUTH AN HOUR BEFORE BEDTIME                                * Notice:  This list has 4 medication(s) that are the same as other medications prescribed for you. Read the directions carefully, and ask your doctor or other care provider to review them with you.

## 2018-08-26 NOTE — IP AVS SNAPSHOT
[address] [address]    Phone:  [phone]                                       After Visit Summary   8/26/2018    Chantell Kidd    MRN: [id number]           After Visit Summary Signature Page     I have received my discharge instructions, and my questions have been answered. I have discussed any challenges I see with this plan with the nurse or doctor.    ..........................................................................................................................................  Patient/Patient Representative Signature      ..........................................................................................................................................  Patient Representative Print Name and Relationship to Patient    ..................................................               ................................................  Date                                            Time    ..........................................................................................................................................  Reviewed by Signature/Title    ...................................................              ..............................................  Date                                                            Time          22EPIC Rev 08/18

## 2018-08-27 LAB
ALBUMIN SERPL-MCNC: 3.2 G/DL (ref 3.4–5)
ALBUMIN UR-MCNC: NEGATIVE MG/DL
ALBUMIN UR-MCNC: NEGATIVE MG/DL
ALP SERPL-CCNC: 100 U/L (ref 40–150)
ALT SERPL W P-5'-P-CCNC: 38 U/L (ref 0–50)
ANION GAP SERPL CALCULATED.3IONS-SCNC: 8 MMOL/L (ref 3–14)
APPEARANCE UR: CLEAR
APPEARANCE UR: CLEAR
AST SERPL W P-5'-P-CCNC: 42 U/L (ref 0–45)
BASOPHILS # BLD AUTO: 0 10E9/L (ref 0–0.2)
BASOPHILS NFR BLD AUTO: 0.3 %
BILIRUB SERPL-MCNC: 0.4 MG/DL (ref 0.2–1.3)
BILIRUB UR QL STRIP: NEGATIVE
BILIRUB UR QL STRIP: NEGATIVE
BUN SERPL-MCNC: 6 MG/DL (ref 7–30)
CALCIUM SERPL-MCNC: 8.2 MG/DL (ref 8.5–10.1)
CHLORIDE SERPL-SCNC: 108 MMOL/L (ref 94–109)
CO2 SERPL-SCNC: 25 MMOL/L (ref 20–32)
COLOR UR AUTO: ABNORMAL
COLOR UR AUTO: NORMAL
CREAT SERPL-MCNC: 0.59 MG/DL (ref 0.52–1.04)
DIFFERENTIAL METHOD BLD: ABNORMAL
EOSINOPHIL # BLD AUTO: 0 10E9/L (ref 0–0.7)
EOSINOPHIL NFR BLD AUTO: 0.1 %
ERYTHROCYTE [DISTWIDTH] IN BLOOD BY AUTOMATED COUNT: 13.3 % (ref 10–15)
GFR SERPL CREATININE-BSD FRML MDRD: >90 ML/MIN/1.7M2
GLUCOSE BLD-MCNC: 37 MG/DL (ref 70–99)
GLUCOSE BLDC GLUCOMTR-MCNC: 112 MG/DL (ref 70–99)
GLUCOSE BLDC GLUCOMTR-MCNC: 120 MG/DL (ref 70–99)
GLUCOSE BLDC GLUCOMTR-MCNC: 132 MG/DL (ref 70–99)
GLUCOSE BLDC GLUCOMTR-MCNC: 137 MG/DL (ref 70–99)
GLUCOSE BLDC GLUCOMTR-MCNC: 140 MG/DL (ref 70–99)
GLUCOSE BLDC GLUCOMTR-MCNC: 144 MG/DL (ref 70–99)
GLUCOSE BLDC GLUCOMTR-MCNC: 146 MG/DL (ref 70–99)
GLUCOSE BLDC GLUCOMTR-MCNC: 159 MG/DL (ref 70–99)
GLUCOSE BLDC GLUCOMTR-MCNC: 164 MG/DL (ref 70–99)
GLUCOSE BLDC GLUCOMTR-MCNC: 190 MG/DL (ref 70–99)
GLUCOSE BLDC GLUCOMTR-MCNC: 20 MG/DL (ref 70–99)
GLUCOSE BLDC GLUCOMTR-MCNC: 31 MG/DL (ref 70–99)
GLUCOSE BLDC GLUCOMTR-MCNC: 32 MG/DL (ref 70–99)
GLUCOSE BLDC GLUCOMTR-MCNC: 41 MG/DL (ref 70–99)
GLUCOSE BLDC GLUCOMTR-MCNC: 43 MG/DL (ref 70–99)
GLUCOSE BLDC GLUCOMTR-MCNC: 49 MG/DL (ref 70–99)
GLUCOSE BLDC GLUCOMTR-MCNC: 54 MG/DL (ref 70–99)
GLUCOSE BLDC GLUCOMTR-MCNC: 63 MG/DL (ref 70–99)
GLUCOSE BLDC GLUCOMTR-MCNC: 68 MG/DL (ref 70–99)
GLUCOSE BLDC GLUCOMTR-MCNC: 70 MG/DL (ref 70–99)
GLUCOSE BLDC GLUCOMTR-MCNC: 71 MG/DL (ref 70–99)
GLUCOSE BLDC GLUCOMTR-MCNC: 78 MG/DL (ref 70–99)
GLUCOSE BLDC GLUCOMTR-MCNC: 80 MG/DL (ref 70–99)
GLUCOSE BLDC GLUCOMTR-MCNC: 81 MG/DL (ref 70–99)
GLUCOSE BLDC GLUCOMTR-MCNC: 81 MG/DL (ref 70–99)
GLUCOSE BLDC GLUCOMTR-MCNC: 83 MG/DL (ref 70–99)
GLUCOSE BLDC GLUCOMTR-MCNC: 84 MG/DL (ref 70–99)
GLUCOSE BLDC GLUCOMTR-MCNC: 84 MG/DL (ref 70–99)
GLUCOSE BLDC GLUCOMTR-MCNC: 86 MG/DL (ref 70–99)
GLUCOSE BLDC GLUCOMTR-MCNC: 86 MG/DL (ref 70–99)
GLUCOSE BLDC GLUCOMTR-MCNC: 88 MG/DL (ref 70–99)
GLUCOSE BLDC GLUCOMTR-MCNC: 89 MG/DL (ref 70–99)
GLUCOSE BLDC GLUCOMTR-MCNC: 89 MG/DL (ref 70–99)
GLUCOSE BLDC GLUCOMTR-MCNC: 90 MG/DL (ref 70–99)
GLUCOSE BLDC GLUCOMTR-MCNC: 97 MG/DL (ref 70–99)
GLUCOSE SERPL-MCNC: 175 MG/DL (ref 70–99)
GLUCOSE SERPL-MCNC: 246 MG/DL (ref 70–99)
GLUCOSE UR STRIP-MCNC: 70 MG/DL
GLUCOSE UR STRIP-MCNC: NEGATIVE MG/DL
HBA1C MFR BLD: 6.6 % (ref 0–5.6)
HCT VFR BLD AUTO: 34.5 % (ref 35–47)
HGB BLD-MCNC: 11.5 G/DL (ref 11.7–15.7)
HGB UR QL STRIP: NEGATIVE
HGB UR QL STRIP: NEGATIVE
IMM GRANULOCYTES # BLD: 0 10E9/L (ref 0–0.4)
IMM GRANULOCYTES NFR BLD: 0.2 %
INTERPRETATION ECG - MUSE: NORMAL
KETONES UR STRIP-MCNC: NEGATIVE MG/DL
KETONES UR STRIP-MCNC: NEGATIVE MG/DL
LACTATE BLD-SCNC: 0.9 MMOL/L (ref 0.7–2)
LACTATE BLD-SCNC: 2.3 MMOL/L (ref 0.7–2)
LEUKOCYTE ESTERASE UR QL STRIP: ABNORMAL
LEUKOCYTE ESTERASE UR QL STRIP: NEGATIVE
LYMPHOCYTES # BLD AUTO: 2 10E9/L (ref 0.8–5.3)
LYMPHOCYTES NFR BLD AUTO: 17.7 %
MAGNESIUM SERPL-MCNC: 1.8 MG/DL (ref 1.6–2.3)
MAGNESIUM SERPL-MCNC: 2.3 MG/DL (ref 1.6–2.3)
MCH RBC QN AUTO: 30.3 PG (ref 26.5–33)
MCHC RBC AUTO-ENTMCNC: 33.3 G/DL (ref 31.5–36.5)
MCV RBC AUTO: 91 FL (ref 78–100)
MONOCYTES # BLD AUTO: 0.6 10E9/L (ref 0–1.3)
MONOCYTES NFR BLD AUTO: 5.6 %
MUCOUS THREADS #/AREA URNS LPF: PRESENT /LPF
NEUTROPHILS # BLD AUTO: 8.5 10E9/L (ref 1.6–8.3)
NEUTROPHILS NFR BLD AUTO: 76.1 %
NITRATE UR QL: NEGATIVE
NITRATE UR QL: NEGATIVE
NRBC # BLD AUTO: 0 10*3/UL
NRBC BLD AUTO-RTO: 0 /100
PH UR STRIP: 5 PH (ref 5–7)
PH UR STRIP: 7 PH (ref 5–7)
PHOSPHATE SERPL-MCNC: 2 MG/DL (ref 2.5–4.5)
PLATELET # BLD AUTO: 281 10E9/L (ref 150–450)
POTASSIUM SERPL-SCNC: 3.2 MMOL/L (ref 3.4–5.3)
POTASSIUM SERPL-SCNC: 4 MMOL/L (ref 3.4–5.3)
POTASSIUM SERPL-SCNC: 4.3 MMOL/L (ref 3.4–5.3)
PROT SERPL-MCNC: 6.5 G/DL (ref 6.8–8.8)
RBC # BLD AUTO: 3.8 10E12/L (ref 3.8–5.2)
RBC #/AREA URNS AUTO: <1 /HPF (ref 0–2)
SODIUM SERPL-SCNC: 141 MMOL/L (ref 133–144)
SOURCE: ABNORMAL
SOURCE: NORMAL
SP GR UR STRIP: 1 (ref 1–1.03)
SP GR UR STRIP: 1.01 (ref 1–1.03)
UROBILINOGEN UR STRIP-MCNC: NORMAL MG/DL (ref 0–2)
UROBILINOGEN UR STRIP-MCNC: NORMAL MG/DL (ref 0–2)
WBC # BLD AUTO: 11.1 10E9/L (ref 4–11)
WBC #/AREA URNS AUTO: 4 /HPF (ref 0–5)

## 2018-08-27 PROCEDURE — 96366 THER/PROPH/DIAG IV INF ADDON: CPT | Performed by: EMERGENCY MEDICINE

## 2018-08-27 PROCEDURE — 36415 COLL VENOUS BLD VENIPUNCTURE: CPT | Performed by: HOSPITALIST

## 2018-08-27 PROCEDURE — 83735 ASSAY OF MAGNESIUM: CPT | Performed by: INTERNAL MEDICINE

## 2018-08-27 PROCEDURE — 40000802 ZZH SITE CHECK

## 2018-08-27 PROCEDURE — A9270 NON-COVERED ITEM OR SERVICE: HCPCS | Mod: GY | Performed by: STUDENT IN AN ORGANIZED HEALTH CARE EDUCATION/TRAINING PROGRAM

## 2018-08-27 PROCEDURE — 40000141 ZZH STATISTIC PERIPHERAL IV START W/O US GUIDANCE

## 2018-08-27 PROCEDURE — 81003 URINALYSIS AUTO W/O SCOPE: CPT | Performed by: STUDENT IN AN ORGANIZED HEALTH CARE EDUCATION/TRAINING PROGRAM

## 2018-08-27 PROCEDURE — 25000128 H RX IP 250 OP 636: Performed by: EMERGENCY MEDICINE

## 2018-08-27 PROCEDURE — 25800025 ZZH RX 258: Performed by: STUDENT IN AN ORGANIZED HEALTH CARE EDUCATION/TRAINING PROGRAM

## 2018-08-27 PROCEDURE — 25000125 ZZHC RX 250: Performed by: INTERNAL MEDICINE

## 2018-08-27 PROCEDURE — 25000125 ZZHC RX 250: Performed by: STUDENT IN AN ORGANIZED HEALTH CARE EDUCATION/TRAINING PROGRAM

## 2018-08-27 PROCEDURE — 36415 COLL VENOUS BLD VENIPUNCTURE: CPT | Performed by: STUDENT IN AN ORGANIZED HEALTH CARE EDUCATION/TRAINING PROGRAM

## 2018-08-27 PROCEDURE — 25000128 H RX IP 250 OP 636: Performed by: STUDENT IN AN ORGANIZED HEALTH CARE EDUCATION/TRAINING PROGRAM

## 2018-08-27 PROCEDURE — 25000132 ZZH RX MED GY IP 250 OP 250 PS 637: Mod: GY | Performed by: INTERNAL MEDICINE

## 2018-08-27 PROCEDURE — 83605 ASSAY OF LACTIC ACID: CPT | Performed by: STUDENT IN AN ORGANIZED HEALTH CARE EDUCATION/TRAINING PROGRAM

## 2018-08-27 PROCEDURE — 83036 HEMOGLOBIN GLYCOSYLATED A1C: CPT | Performed by: STUDENT IN AN ORGANIZED HEALTH CARE EDUCATION/TRAINING PROGRAM

## 2018-08-27 PROCEDURE — 00000146 ZZHCL STATISTIC GLUCOSE BY METER IP

## 2018-08-27 PROCEDURE — 99223 1ST HOSP IP/OBS HIGH 75: CPT | Mod: AI | Performed by: INTERNAL MEDICINE

## 2018-08-27 PROCEDURE — 83605 ASSAY OF LACTIC ACID: CPT | Performed by: HOSPITALIST

## 2018-08-27 PROCEDURE — 25000132 ZZH RX MED GY IP 250 OP 250 PS 637: Mod: GY | Performed by: STUDENT IN AN ORGANIZED HEALTH CARE EDUCATION/TRAINING PROGRAM

## 2018-08-27 PROCEDURE — 96365 THER/PROPH/DIAG IV INF INIT: CPT | Performed by: EMERGENCY MEDICINE

## 2018-08-27 PROCEDURE — 87040 BLOOD CULTURE FOR BACTERIA: CPT | Performed by: STUDENT IN AN ORGANIZED HEALTH CARE EDUCATION/TRAINING PROGRAM

## 2018-08-27 PROCEDURE — 84100 ASSAY OF PHOSPHORUS: CPT | Performed by: STUDENT IN AN ORGANIZED HEALTH CARE EDUCATION/TRAINING PROGRAM

## 2018-08-27 PROCEDURE — 80053 COMPREHEN METABOLIC PANEL: CPT | Performed by: STUDENT IN AN ORGANIZED HEALTH CARE EDUCATION/TRAINING PROGRAM

## 2018-08-27 PROCEDURE — 83735 ASSAY OF MAGNESIUM: CPT | Performed by: STUDENT IN AN ORGANIZED HEALTH CARE EDUCATION/TRAINING PROGRAM

## 2018-08-27 PROCEDURE — 25800025 ZZH RX 258: Performed by: INTERNAL MEDICINE

## 2018-08-27 PROCEDURE — 82947 ASSAY GLUCOSE BLOOD QUANT: CPT | Performed by: HOSPITALIST

## 2018-08-27 PROCEDURE — 84132 ASSAY OF SERUM POTASSIUM: CPT | Performed by: INTERNAL MEDICINE

## 2018-08-27 PROCEDURE — 82947 ASSAY GLUCOSE BLOOD QUANT: CPT | Performed by: INTERNAL MEDICINE

## 2018-08-27 PROCEDURE — 84132 ASSAY OF SERUM POTASSIUM: CPT | Performed by: STUDENT IN AN ORGANIZED HEALTH CARE EDUCATION/TRAINING PROGRAM

## 2018-08-27 PROCEDURE — 85025 COMPLETE CBC W/AUTO DIFF WBC: CPT | Performed by: STUDENT IN AN ORGANIZED HEALTH CARE EDUCATION/TRAINING PROGRAM

## 2018-08-27 PROCEDURE — 12000005 ZZH R&B IMCU CRITICAL UMMC

## 2018-08-27 PROCEDURE — 36415 COLL VENOUS BLD VENIPUNCTURE: CPT | Performed by: INTERNAL MEDICINE

## 2018-08-27 PROCEDURE — A9270 NON-COVERED ITEM OR SERVICE: HCPCS | Mod: GY | Performed by: INTERNAL MEDICINE

## 2018-08-27 RX ORDER — POTASSIUM CHLORIDE 7.45 MG/ML
10 INJECTION INTRAVENOUS
Status: DISCONTINUED | OUTPATIENT
Start: 2018-08-27 | End: 2018-08-29 | Stop reason: HOSPADM

## 2018-08-27 RX ORDER — POTASSIUM CHLORIDE 750 MG/1
20-40 TABLET, EXTENDED RELEASE ORAL
Status: DISCONTINUED | OUTPATIENT
Start: 2018-08-27 | End: 2018-08-29 | Stop reason: HOSPADM

## 2018-08-27 RX ORDER — POTASSIUM CHLORIDE 1.5 G/1.58G
60 POWDER, FOR SOLUTION ORAL ONCE
Status: DISCONTINUED | OUTPATIENT
Start: 2018-08-27 | End: 2018-08-29 | Stop reason: HOSPADM

## 2018-08-27 RX ORDER — OXYCODONE HYDROCHLORIDE 5 MG/1
5 TABLET ORAL EVERY 8 HOURS PRN
Status: DISCONTINUED | OUTPATIENT
Start: 2018-08-27 | End: 2018-08-29 | Stop reason: HOSPADM

## 2018-08-27 RX ORDER — POTASSIUM CHLORIDE 29.8 MG/ML
20 INJECTION INTRAVENOUS
Status: DISCONTINUED | OUTPATIENT
Start: 2018-08-27 | End: 2018-08-29 | Stop reason: HOSPADM

## 2018-08-27 RX ORDER — ASPIRIN 81 MG/1
81 TABLET, CHEWABLE ORAL DAILY
Status: DISCONTINUED | OUTPATIENT
Start: 2018-08-27 | End: 2018-08-29 | Stop reason: HOSPADM

## 2018-08-27 RX ORDER — NALOXONE HYDROCHLORIDE 0.4 MG/ML
.1-.4 INJECTION, SOLUTION INTRAMUSCULAR; INTRAVENOUS; SUBCUTANEOUS
Status: DISCONTINUED | OUTPATIENT
Start: 2018-08-27 | End: 2018-08-29 | Stop reason: HOSPADM

## 2018-08-27 RX ORDER — HYDROCORTISONE 20 MG/1
20 TABLET ORAL 2 TIMES DAILY
Status: COMPLETED | OUTPATIENT
Start: 2018-08-27 | End: 2018-08-27

## 2018-08-27 RX ORDER — POTASSIUM CHLORIDE 1.5 G/1.58G
20-40 POWDER, FOR SOLUTION ORAL
Status: DISCONTINUED | OUTPATIENT
Start: 2018-08-27 | End: 2018-08-29 | Stop reason: HOSPADM

## 2018-08-27 RX ORDER — METOCLOPRAMIDE 5 MG/1
5 TABLET ORAL 2 TIMES DAILY PRN
COMMUNITY
End: 2018-09-11

## 2018-08-27 RX ORDER — DEXTROSE MONOHYDRATE 100 MG/ML
INJECTION, SOLUTION INTRAVENOUS ONCE
Status: DISCONTINUED | OUTPATIENT
Start: 2018-08-27 | End: 2018-08-29 | Stop reason: HOSPADM

## 2018-08-27 RX ORDER — DRONABINOL 2.5 MG/1
5 CAPSULE ORAL 2 TIMES DAILY PRN
Status: DISCONTINUED | OUTPATIENT
Start: 2018-08-27 | End: 2018-08-29 | Stop reason: HOSPADM

## 2018-08-27 RX ORDER — DEXTROSE MONOHYDRATE 25 G/50ML
25-50 INJECTION, SOLUTION INTRAVENOUS
Status: DISCONTINUED | OUTPATIENT
Start: 2018-08-27 | End: 2018-08-29 | Stop reason: HOSPADM

## 2018-08-27 RX ORDER — ONDANSETRON 2 MG/ML
4 INJECTION INTRAMUSCULAR; INTRAVENOUS EVERY 6 HOURS PRN
Status: DISCONTINUED | OUTPATIENT
Start: 2018-08-27 | End: 2018-08-29 | Stop reason: HOSPADM

## 2018-08-27 RX ORDER — PREGABALIN 75 MG/1
150 CAPSULE ORAL 3 TIMES DAILY
Status: DISCONTINUED | OUTPATIENT
Start: 2018-08-27 | End: 2018-08-29 | Stop reason: HOSPADM

## 2018-08-27 RX ORDER — AMOXICILLIN 250 MG
2 CAPSULE ORAL DAILY
COMMUNITY
End: 2018-09-11

## 2018-08-27 RX ORDER — NICOTINE POLACRILEX 4 MG
15-30 LOZENGE BUCCAL
Status: DISCONTINUED | OUTPATIENT
Start: 2018-08-27 | End: 2018-08-29 | Stop reason: HOSPADM

## 2018-08-27 RX ORDER — ALENDRONATE SODIUM 70 MG/1
70 TABLET ORAL
Status: DISCONTINUED | OUTPATIENT
Start: 2018-08-27 | End: 2018-08-27

## 2018-08-27 RX ORDER — HYDROCORTISONE 10 MG/1
10 TABLET ORAL 2 TIMES DAILY
Status: DISCONTINUED | OUTPATIENT
Start: 2018-08-27 | End: 2018-08-27

## 2018-08-27 RX ORDER — DULOXETIN HYDROCHLORIDE 60 MG/1
60 CAPSULE, DELAYED RELEASE ORAL DAILY
Status: DISCONTINUED | OUTPATIENT
Start: 2018-08-27 | End: 2018-08-29 | Stop reason: HOSPADM

## 2018-08-27 RX ORDER — HYDROCORTISONE 10 MG/1
10 TABLET ORAL 2 TIMES DAILY
Status: DISCONTINUED | OUTPATIENT
Start: 2018-08-28 | End: 2018-08-28

## 2018-08-27 RX ORDER — MAGNESIUM SULFATE HEPTAHYDRATE 40 MG/ML
4 INJECTION, SOLUTION INTRAVENOUS EVERY 4 HOURS PRN
Status: DISCONTINUED | OUTPATIENT
Start: 2018-08-27 | End: 2018-08-29 | Stop reason: HOSPADM

## 2018-08-27 RX ORDER — POTASSIUM CHLORIDE 7.45 MG/ML
10 INJECTION INTRAVENOUS ONCE
Status: COMPLETED | OUTPATIENT
Start: 2018-08-27 | End: 2018-08-27

## 2018-08-27 RX ORDER — TOPIRAMATE 50 MG/1
100 TABLET, FILM COATED ORAL 2 TIMES DAILY
Status: DISCONTINUED | OUTPATIENT
Start: 2018-08-27 | End: 2018-08-29 | Stop reason: HOSPADM

## 2018-08-27 RX ORDER — POTASSIUM CL/LIDO/0.9 % NACL 10MEQ/0.1L
10 INTRAVENOUS SOLUTION, PIGGYBACK (ML) INTRAVENOUS
Status: DISCONTINUED | OUTPATIENT
Start: 2018-08-27 | End: 2018-08-27 | Stop reason: RX

## 2018-08-27 RX ORDER — PANTOPRAZOLE SODIUM 40 MG/1
40 TABLET, DELAYED RELEASE ORAL 2 TIMES DAILY
Status: DISCONTINUED | OUTPATIENT
Start: 2018-08-27 | End: 2018-08-29 | Stop reason: HOSPADM

## 2018-08-27 RX ORDER — POLYETHYLENE GLYCOL 3350 17 G/17G
17 POWDER, FOR SOLUTION ORAL 2 TIMES DAILY PRN
Status: DISCONTINUED | OUTPATIENT
Start: 2018-08-27 | End: 2018-08-29 | Stop reason: HOSPADM

## 2018-08-27 RX ORDER — LEVOTHYROXINE SODIUM 112 UG/1
112 TABLET ORAL DAILY
Status: DISCONTINUED | OUTPATIENT
Start: 2018-08-27 | End: 2018-08-29 | Stop reason: HOSPADM

## 2018-08-27 RX ORDER — ONDANSETRON 4 MG/1
4 TABLET, ORALLY DISINTEGRATING ORAL EVERY 6 HOURS PRN
Status: DISCONTINUED | OUTPATIENT
Start: 2018-08-27 | End: 2018-08-29 | Stop reason: HOSPADM

## 2018-08-27 RX ORDER — SENNOSIDES 8.6 MG
1-2 TABLET ORAL DAILY PRN
Status: DISCONTINUED | OUTPATIENT
Start: 2018-08-27 | End: 2018-08-29 | Stop reason: HOSPADM

## 2018-08-27 RX ORDER — CYCLOBENZAPRINE HCL 5 MG
10 TABLET ORAL 2 TIMES DAILY PRN
COMMUNITY
End: 2018-09-25

## 2018-08-27 RX ORDER — LIDOCAINE 40 MG/G
CREAM TOPICAL
Status: DISCONTINUED | OUTPATIENT
Start: 2018-08-27 | End: 2018-08-29 | Stop reason: HOSPADM

## 2018-08-27 RX ORDER — HYDROCORTISONE 10 MG/1
10 TABLET ORAL ONCE
Status: COMPLETED | OUTPATIENT
Start: 2018-08-27 | End: 2018-08-27

## 2018-08-27 RX ORDER — ACETAMINOPHEN 500 MG
1000 TABLET ORAL 3 TIMES DAILY PRN
Status: DISCONTINUED | OUTPATIENT
Start: 2018-08-27 | End: 2018-08-29 | Stop reason: HOSPADM

## 2018-08-27 RX ORDER — DULOXETIN HYDROCHLORIDE 30 MG/1
30 CAPSULE, DELAYED RELEASE ORAL DAILY
Status: DISCONTINUED | OUTPATIENT
Start: 2018-08-27 | End: 2018-08-29 | Stop reason: HOSPADM

## 2018-08-27 RX ADMIN — POTASSIUM CHLORIDE 10 MEQ: 10 INJECTION, SOLUTION INTRAVENOUS at 00:02

## 2018-08-27 RX ADMIN — POTASSIUM CHLORIDE 20 MEQ: 750 TABLET, EXTENDED RELEASE ORAL at 14:50

## 2018-08-27 RX ADMIN — TOPIRAMATE 100 MG: 50 TABLET ORAL at 20:18

## 2018-08-27 RX ADMIN — SODIUM CHLORIDE 1000 ML: 9 INJECTION, SOLUTION INTRAVENOUS at 00:02

## 2018-08-27 RX ADMIN — HYDROCORTISONE 10 MG: 10 TABLET ORAL at 09:10

## 2018-08-27 RX ADMIN — PREGABALIN 150 MG: 75 CAPSULE ORAL at 20:18

## 2018-08-27 RX ADMIN — SODIUM CHLORIDE 175 ML/HR: 234 INJECTION INTRAMUSCULAR; INTRAVENOUS; SUBCUTANEOUS at 22:38

## 2018-08-27 RX ADMIN — ACETAMINOPHEN 1000 MG: 500 TABLET, FILM COATED ORAL at 20:18

## 2018-08-27 RX ADMIN — ASPIRIN 81 MG CHEWABLE TABLET 81 MG: 81 TABLET CHEWABLE at 09:10

## 2018-08-27 RX ADMIN — NYSTATIN 100000 UNITS: 100000 SUSPENSION ORAL at 13:42

## 2018-08-27 RX ADMIN — POTASSIUM CHLORIDE 10 MEQ: 10 INJECTION, SOLUTION INTRAVENOUS at 02:27

## 2018-08-27 RX ADMIN — HYDROCORTISONE 20 MG: 20 TABLET ORAL at 20:18

## 2018-08-27 RX ADMIN — DEXTROSE MONOHYDRATE 50 ML: 500 INJECTION PARENTERAL at 07:03

## 2018-08-27 RX ADMIN — PANTOPRAZOLE SODIUM 40 MG: 40 TABLET, DELAYED RELEASE ORAL at 09:12

## 2018-08-27 RX ADMIN — DEXTROSE MONOHYDRATE 50 ML: 500 INJECTION PARENTERAL at 05:20

## 2018-08-27 RX ADMIN — ACETAMINOPHEN 1000 MG: 500 TABLET, FILM COATED ORAL at 03:55

## 2018-08-27 RX ADMIN — DEXTROSE MONOHYDRATE 50 ML: 500 INJECTION PARENTERAL at 07:54

## 2018-08-27 RX ADMIN — DEXTROSE MONOHYDRATE 50 ML: 500 INJECTION PARENTERAL at 09:25

## 2018-08-27 RX ADMIN — DULOXETINE HYDROCHLORIDE 30 MG: 30 CAPSULE, DELAYED RELEASE ORAL at 09:11

## 2018-08-27 RX ADMIN — SODIUM CHLORIDE 125 ML/HR: 234 INJECTION INTRAMUSCULAR; INTRAVENOUS; SUBCUTANEOUS at 09:09

## 2018-08-27 RX ADMIN — NYSTATIN 100000 UNITS: 100000 SUSPENSION ORAL at 18:36

## 2018-08-27 RX ADMIN — PANTOPRAZOLE SODIUM 40 MG: 40 TABLET, DELAYED RELEASE ORAL at 20:18

## 2018-08-27 RX ADMIN — LINACLOTIDE 145 MCG: 145 CAPSULE, GELATIN COATED ORAL at 09:11

## 2018-08-27 RX ADMIN — DULOXETINE HYDROCHLORIDE 60 MG: 60 CAPSULE, DELAYED RELEASE ORAL at 09:12

## 2018-08-27 RX ADMIN — DEXTROSE MONOHYDRATE 50 ML: 500 INJECTION PARENTERAL at 11:20

## 2018-08-27 RX ADMIN — OXYCODONE HYDROCHLORIDE 5 MG: 5 TABLET ORAL at 13:53

## 2018-08-27 RX ADMIN — NYSTATIN 100000 UNITS: 100000 SUSPENSION ORAL at 09:16

## 2018-08-27 RX ADMIN — NYSTATIN 100000 UNITS: 100000 SUSPENSION ORAL at 20:19

## 2018-08-27 RX ADMIN — ONDANSETRON 4 MG: 2 INJECTION INTRAMUSCULAR; INTRAVENOUS at 09:08

## 2018-08-27 RX ADMIN — HYDROCORTISONE 10 MG: 10 TABLET ORAL at 13:52

## 2018-08-27 RX ADMIN — POTASSIUM CHLORIDE 40 MEQ: 750 TABLET, EXTENDED RELEASE ORAL at 12:13

## 2018-08-27 RX ADMIN — PREGABALIN 150 MG: 75 CAPSULE ORAL at 09:21

## 2018-08-27 RX ADMIN — DEXTROSE MONOHYDRATE 25 ML: 500 INJECTION PARENTERAL at 22:43

## 2018-08-27 RX ADMIN — DEXTROSE MONOHYDRATE 50 ML: 500 INJECTION PARENTERAL at 06:03

## 2018-08-27 RX ADMIN — PREGABALIN 150 MG: 75 CAPSULE ORAL at 13:53

## 2018-08-27 RX ADMIN — POTASSIUM PHOSPHATE, MONOBASIC AND POTASSIUM PHOSPHATE, DIBASIC 15 MMOL: 224; 236 INJECTION, SOLUTION INTRAVENOUS at 14:50

## 2018-08-27 RX ADMIN — TOPIRAMATE 100 MG: 50 TABLET ORAL at 09:10

## 2018-08-27 RX ADMIN — Medication 2 G: at 13:43

## 2018-08-27 RX ADMIN — DEXTROSE MONOHYDRATE 25 ML: 500 INJECTION PARENTERAL at 21:57

## 2018-08-27 RX ADMIN — LEVOTHYROXINE SODIUM 112 MCG: 112 TABLET ORAL at 09:12

## 2018-08-27 RX ADMIN — DICLOFENAC SODIUM 2 G: 10 GEL TOPICAL at 20:19

## 2018-08-27 ASSESSMENT — PAIN DESCRIPTION - DESCRIPTORS
DESCRIPTORS: SHARP
DESCRIPTORS: STABBING;TIGHTNESS

## 2018-08-27 ASSESSMENT — ACTIVITIES OF DAILY LIVING (ADL)
ADLS_ACUITY_SCORE: 11

## 2018-08-27 NOTE — ED NOTES
Pt.'s son called ED concerned that his mother's BG was low and she was not responding to him anymore on the phone. Author checked on patient and found her responsive to voice. BG was 31. 1/2 amp of D50 given, D10 infusion started per ED MD.

## 2018-08-27 NOTE — ED NOTES
POC Blood glucose 49, provider updated.  D10 rate increased to 200 ml/hr per provider.  Patient is easily arousable.

## 2018-08-27 NOTE — PROVIDER NOTIFICATION
Dr. Severino notified that patient's BG is 43 and she is receiving her second amp of D50 in the last 1.5 hours.  D10 1/2 NS is infusing at 125mL/hr.  Provider will speak to endocrinology, no new orders at this time.

## 2018-08-27 NOTE — PLAN OF CARE
Admission          8/26/2018 10:24 PM  -----------------------------------------------------------  Reason for admission:  Primary team notified of pt arrival.  Admitted from: ED   Via: stretcher  Accompanied by: alone  Belongings: Placed in closet; valuables sent home with family  Admission Profile: complete  Teaching: orientation to unit and call light- call light within reach, call don't fall, use of console, meal times, when to call for the RN, and enforced importance of safety   Access: 2 PIV L hand  Telemetry:Placed on pt  Ht./Wt.: complete  2 RN Skin Assessment Completed By: Steffi Acevedo   Pt status: VSS. BS 81.     Temp:  [97.2  F (36.2  C)-97.6  F (36.4  C)] 97.6  F (36.4  C)  Pulse:  [80] 80  Heart Rate:  [71-84] 71  Resp:  [9-23] 23  BP: ()/(58-70) 100/61  SpO2:  [94 %-100 %] 99 %\

## 2018-08-27 NOTE — PHARMACY-ADMISSION MEDICATION HISTORY
Admission medication history interview status for the 8/26/2018 admission is complete. See Epic admission navigator for allergy information, pharmacy, prior to admission medications and immunization status.     Medication history interview sources:  Patient, Epic, Lydia Orozco    Changes made to PTA medication list (reason)  Added: senna-docusate [per patient].   Deleted: senna, docusate [per patient], sodium bicarbonate [per patient only whn she had feeding tube]  Changed: All changes per patient    Myalnta/Maalox: Chew and swallow 2 chews every 4 hours as needed --> Chew and swallow 1 chew three times as needed     Creon: Take 6 capsules by mouth with meals and take 3 capsules by mouth with snacks --> Take 6 to 8 capsules by mouth with meals and take 4 capsules by mouth with snack    Cyclobenzaprine: Take 5 mg by mouth three times daily as needed --> Take 10 mg by mouth twice daily as needed    Metoclopramide: Take 10 mg by mouth three times daily --> Take 5 mg by mouth twice daily    Additional medication history information (including reliability of information, actions taken by pharmacist):    The patient recognized the names of her medications and how she took them and their doses, however unclear how reliable the patient is    When speaking with [facility]'s pharmacist, the patient has not picked up mediations since April (with the exception of constipation medication in July) and was only given 30 day supply. It seems the patient is not compliant. Pam did not see any medications that were transferred to another pharmacy     The patient endorsed that she still took fluconazole and nystatin, however per Pam, these have not been picked up in over 1 year, unclear if the patient still has some at home or getting from another pharmacy that she did not mention.     The patient does use an insulin pump at home and did not know any of her settings  Prior to Admission medications    Medication Sig Last Dose  Taking? Auth Provider   acetaminophen 500 MG CAPS Take 1,000 mg by mouth three times a day as needed. 8/26/2018 at Unknown time Yes Reported, Patient   alendronate (FOSAMAX) 70 MG tablet Take 1 tablet (70 mg) by mouth every 7 days On Sundays take first thing in the morning with plain water and remain upright for at least 30 minutes and until after first food of the day    Do not restart Fosamax (alendronate) until your difficulty swallowing has resolved and you have finished the entire course of fluconazole (Diflucan). 8/26/2018 Yes Kendall Owen MD   alum & mag hydroxide-simethicone (MYLANTA/MAALOX) 200-200-25 MG CHEW chewable tablet Take 1 tablet by mouth 3 times daily as needed for indigestion 8/26/2018 at Unknown time Yes Unknown, Entered By History   amylase-lipase-protease (CREON 12) 98480 units CPEP Take 6 to 8 capsules by mouth with meals and take 4 capsules with snacks 8/26/2018 at Unknown time Yes Unknown, Entered By History   aspirin 81 MG tablet Take 81 mg by mouth daily. 8/26/2018 at Unknown time Yes Reported, Patient   cyclobenzaprine (FLEXERIL) 5 MG tablet Take 10 mg by mouth 2 times daily as needed for muscle spasms 8/26/2018 at Unknown time Yes Unknown, Entered By History   diclofenac (VOLTAREN) 1 % GEL 2 g Apply 2 g to skin four times a day as needed (to affected upper extremity joint(s)). Maximum 8g/day per joint, 16g/day total. Past Week at Unknown time Yes Reported, Patient   dicyclomine (BENTYL) 10 MG capsule TAKE ONE CAPSULE BY MOUTH EVERY 6 HOURS AS NEEDED 8/26/2018 at Unknown time Yes Claudia Sosa MD   dronabinol (MARINOL) 2.5 MG capsule Take 2 capsules (5 mg) by mouth 2 times daily as needed 8/26/2018 at Unknown time Yes Ron Morgan MD   DULoxetine (CYMBALTA) 30 MG EC capsule Take 1 capsule (30 mg) by mouth daily With 60mg capsule for total dose of 90mg 8/26/2018 at Unknown time Yes Ron Morgan MD   DULoxetine (CYMBALTA) 60 MG EC capsule Take 1 capsule (60  mg) by mouth daily With 30mg capsule for total dose of 90mg 8/26/2018 at Unknown time Yes Ron Morgan MD   fluconazole (DIFLUCAN) 200 MG tablet Take 2 tabs the first day and 1 tab for the next 13 days 8/26/2018 at Unknown time Yes Carina Ramírez MD   furosemide (LASIX) 20 MG tablet Take 1 tablet (20 mg) by mouth 2 times daily 8/26/2018 at Unknown time Yes Ron Morgan MD   hydrocortisone (CORTEF) 10 MG tablet Take 10 mg in the morning and 10 mg at bedtime. Watch for hypoglycemia recurrence. 8/26/2018 at Unknown time Yes Amena Maher MD   insulin aspart (NOVOLOG VIAL) 100 UNITS/ML VIAL As directed in pump 8/26/2018 at Unknown time Yes Ron Morgan MD   levothyroxine (SYNTHROID/LEVOTHROID) 112 MCG tablet Take 1 tablet (112 mcg) by mouth daily 8/26/2018 at Unknown time Yes Ron Morgan MD   linaclotide (LINZESS) 145 MCG capsule Take 1 capsule (145 mcg) by mouth every morning (before breakfast) 8/26/2018 at Unknown time Yes Ron Morgan MD   metoclopramide (REGLAN) 5 MG tablet Take 5 mg by mouth 2 times daily as needed 8/26/2018 at Unknown time Yes Unknown, Entered By History   nystatin (MYCOSTATIN) 28156 unit/0.5mL SUSP Take 1 mL (100,000 Units) by mouth 4 times daily 8/26/2018 at Unknown time Yes Gabriel Scruggs MD   ondansetron (ZOFRAN-ODT) 4 MG ODT tab DISSOLVE ONE TABLET ON THE TONGUE EVERY 6 HOURS AS NEEDED FOR NAUSEA AND VOMITING 8/26/2018 at Unknown time Yes Ron Morgan MD   pantoprazole (PROTONIX) 40 MG enteric coated tablet Take 1 tablet (40 mg) by mouth 2 times daily 8/26/2018 at Unknown time Yes Ron Morgan MD   polyethylene glycol (MIRALAX/GLYCOLAX) packet Take 1 packet by mouth 2 times daily as needed for constipation  8/24/2018 Yes Rosalee Dee MD   potassium chloride SA (K-DUR/KLOR-CON M) 20 MEQ CR tablet Take 1 tablet (20 mEq) by mouth daily 8/26/2018 at Unknown time Yes  Ron Morgan MD   pregabalin (LYRICA) 150 MG capsule Take 1 capsule (150 mg) by mouth 3 times daily 8/26/2018 at Unknown time Yes Ron Morgan MD   senna-docusate (SENOKOT-S;PERICOLACE) 8.6-50 MG per tablet Take 2 tablets by mouth daily 8/26/2018 at Unknown time Yes Unknown, Entered By History   SUMAtriptan (IMITREX) 50 MG tablet Take 1 tablet (50 mg) by mouth at onset of headache for migraine Take 1 Tab by mouth Once as needed for Migraine Headache. May repeat after two hours.  Maximum dose 200 mg/24 hours. 8/25/2018 Yes Ron Morgan MD   topiramate (TOPAMAX) 100 MG tablet Take 1 tablet (100 mg) by mouth 2 times daily 8/25/2018 Yes Ron Morgan MD   traZODone (DESYREL) 100 MG tablet TAKE 1-2 TABLETS BY MOUTH AN HOUR BEFORE BEDTIME Past Week at Unknown time Yes Ron Morgan MD   ACCU-CHEK MULTICLIX LANCETS MISC by Lancet route. Use to test blood sugar daily or as directed.   Mike Ruiz MD   blood glucose monitoring (NOEMI CONTOUR NEXT) test strip Use to test blood sugar 8 times daily.   Amena Maher MD   blood glucose monitoring (NO BRAND SPECIFIED) test strip Use to test blood glucoses 6-8 times per day.   Amena Maher MD   glucagon (GLUCAGON EMERGENCY) 1 MG kit Inject 1 mg into the muscle once for 1 dose   Amena Maher MD   insulin pen needle needle RX# [id number]   Pen Needle UC 31G UF IV Mini   Use 4-8 needles per day for insulin injections.       Amena Maher MD   Nutritional Supplements (BOOST HIGH PROTEIN) LIQD Also can use Ensure clear (available over the counter)   Vicenta Hernandez MD         Medication history completed by: Shagufta Evans, PharmD Student

## 2018-08-27 NOTE — PROGRESS NOTES
[facility], [place]    Sepsis Evaluation Progress Note    Date of Service: 08/27/2018    I was called to see Chantell Kidd due to abnormal vital signs triggering the Sepsis SIRS screening alert. She is not known to have an infection.     Physical Exam    Vital Signs:  Temp: 98.5  F (36.9  C) Temp src: Oral BP: 112/65 Pulse: 81 Heart Rate: 82 Resp: 22 SpO2: 99 % O2 Device: None (Room air)      Lab:  Lactic Acid   Date Value Ref Range Status   08/26/2018 3.8 (H) 0.7 - 2.0 mmol/L Final     Lactate for Sepsis Protocol   Date Value Ref Range Status   08/27/2018 2.3 (H) 0.7 - 2.0 mmol/L Final     Comment:     Significant value called to and read back by  CARLOS MINER ON 08/27/18 AT 0829 BY TG          The patient is at baseline mental status.    The rest of their physical exam is significant for mild anxiety, normocardia, non-labored breathing, mild right sided abdominal tenderness.    Assessment and Plan    The SIRS and exam findings are likely due to discomfort from abdominal pain and hypoglycemia, there is no sign of sepsis at this time. Possible gastroenteritis seen on CT, but no diarrhea or vomiting at this time.     Plan:   - D5/ 1/2 NS running at 125cc/hr   - D50 as needed   - will continue to closely monitor condition    Disposition: The patient will remain on the current unit. We will continue to monitor this patient closely.    Sonia Severino MD

## 2018-08-27 NOTE — PROGRESS NOTES
"[facility], [place]    Internal Medicine Progress Note - [facility] Service    Main Plans for Today    - Endocrine consult   - Increase steroids   - Monitor BGs     Assessment & Plan   Chantell Kidd is a 52 year old female w/ pmhx of chronic pancreatitis s/p islet auto transplantation resultant in T1DM, chronic abd pain, presents with 1 day of abd pain and loss of consciousness per family, found to have hypoglycemia in 40s. Continues to have labile blood sugars, some as low as 20's.      # Hypoglycemia   # Hypokalemia  # S/p panacreatectomy with autoislet cell transplant   # Type 1 DM   Pt has used insulin pump for 5-6 yrs, with occasional hypoglycemic episodes resulting in ED visits. Ate poorly due to feeling ill, but bolused insulin day of admission despite poor intake. Was drinking wine coolers, came in with ETOH level of 0.194. ED BG 49,  K+ 2.9 on admission, baseline normal. Likely 2/2 insulin infusion in setting of poor PO intake. Hypoglycemia and hypokalemia likely both 2/2 insulin overdose. K improved to 3.2 with improvement of sugars.   - Endocrine consulted, appreciate recs      - Increase steroids from 10mg BID to 20mg BID  - No insulin pump seen, taken off by family. Call family to bring in pump, obtain setting  - D10/ 1/2 NS at 125cc/hr  - Requiring multiple D50 bumps throughout day  - CHO consistent diet  - Lyte replacement protocol, K, Mag, Phos.      # Abdominal Pain  # Leukocytosis  H/o chronic abd pain. CT read concerning for mild gastroenteritis, no discussion of pancreatic involvement. H/o chronic pancreatitis s/p islet auto-transplant. Recent etoh consumption, cannot r/o pancreatitis exacerbation.. Abd pain likely exacerbated by current episode of hypoglycemia. WBC 18 on admission --> 11 after fluids.    - Monitor   - Continue APAP   - Oxycodone 5mg Q8hrs PRN overnight   - Patient told RN she has \"oxycodone, dilaudid, and methadone\" at home for as needed - as prescribed by " pain doctor. No notes from pain since 2015 and no opiates on admission drug screen.     # Lactic acidosis  3.8 on admission --> 0.9 with fluids. Likely secondary to dehydration and hypoglycemia. No other sign of systemic infection. No antibiotics at this point, will closely monitor.      Chronic Medical Problems    - CIERRA: PTA cymbalta, HOLD trazodone until mental status baseline   - Hypothyroid: PTA levothyroxine   - Adrenal Insufficiency: continue pta hydrocortisone   - Chronic Pain: continue pta tylenol, simethicone. HOLD dicyclomine until proven no bowel obstruction    # Pain Assessment:  Current Pain Score 8/27/2018   Patient currently in pain? yes   Pain score (0-10) -   Pain location Abdomen   Pain descriptors Stabbing;Tightness   - Pain management was discussed and the plan was created in a collaborative fashion.  Chantell's response to the current recommendations: resistant  - Please see the plan for pain management as documented above    Diet: Consistent Carbohydrate Diet  IVF: D10/ 1/2 NS  DVT Prophylaxis: Pneumatic Compression Devices  Code Status: Full Code    Disposition Plan   Expected discharge: 2 - 3 days, recommended to prior living arrangement once blood sugars stable.     Entered: Sonia Severino 08/27/2018, 3:42 PM   Information in the above section will display in the discharge planner report.      The patient's care was discussed with the Attending Physician, Dr. Ratliff.    Sonia Severino MD  [facility]: 5  Pager: [phone]  Please see sticky note for cross cover information    Interval History   Patient admitted overnight. Continued to have labile BGs, requiring multiple amps of D50. Endorses nausea and requesting pain medications - asking specifically for oxycodone then dilaudid when oxycodone denied initially. Denies any diarrhea or vomiting, had been feeling poorly previous days. States she drinks rarely. Denies trouble breathing or CP.     Physical Exam   /69 (BP  "Location: Right arm)  Pulse 87  Temp 98.7  F (37.1  C) (Oral)  Resp 20  Ht 1.575 m (5' 2\")  Wt 61.2 kg (135 lb)  SpO2 98%  BMI 24.69 kg/m2    Gen: NAD, alert, cooperative, non-toxic but ill appearing  HEENT: EOMI, no scleral icterus, tracking appropriately  Resp: CTAB, no crackles or wheezes, no increased WOB  Cardiac: RRR, no S3/S4, no M/R/G appreciated  GI: soft, normoactive bowel sounds, no tenderness when pushing with stethoscope, pain with palpation by hand on left side of abdomen. Non-distended  Ext: WWP, no edema, spontaneous movement in all 4  Neuro: alert, CN 2-12 grossly intact, appropriate mentation    Data   Medications     dextrose 10% and 0.45% NaCl 175 mL/hr (08/27/18 1600)       amylase-lipase-protease  6 capsule Oral TID w/meals     aspirin  81 mg Oral Daily     dextrose   Intravenous Once     diclofenac  2 g Topical 4x Daily     DULoxetine  30 mg Oral Daily     DULoxetine  60 mg Oral Daily     hydrocortisone  20 mg Oral BID     levothyroxine  112 mcg Oral Daily     linaclotide  145 mcg Oral QAM AC     nystatin  100,000 Units Oral 4x Daily     pantoprazole  40 mg Oral BID     potassium chloride  60 mEq Oral Once     pregabalin  150 mg Oral TID     sodium chloride (PF)  3 mL Intracatheter Q8H     topiramate  100 mg Oral BID     Data     Recent Labs  Lab 08/27/18  1116 08/27/18  0936 08/27/18  0800 08/26/18  2236   WBC  --  11.1*  --  18.6*   HGB  --  11.5*  --  11.8   MCV  --  91  --  90   PLT  --  281  --  318   NA  --  141  --  140   POTASSIUM  --  3.2*  --  2.5*   CHLORIDE  --  108  --  107   CO2  --  25  --  22   BUN  --  6*  --  7   CR  --  0.59  --  0.59   ANIONGAP  --  8  --  12   ELIZA  --  8.2*  --  8.3*   GLC 37* 175* 246* 69*   ALBUMIN  --  3.2*  --  3.7   PROTTOTAL  --  6.5*  --  7.2   BILITOTAL  --  0.4  --  0.2   ALKPHOS  --  100  --  106   ALT  --  38  --  43   AST  --  42  --  35   LIPASE  --   --   --  36*     Recent Results (from the past 24 hour(s))   CT Abdomen Pelvis w " Contrast    Narrative    EXAMINATION: CT ABDOMEN PELVIS W CONTRAST, 8/27/2018 12:04 AM    TECHNIQUE:  Helical CT images from the lung bases through the  symphysis pubis were obtained  with contrast.    CONTRAST DOSE: 83 ml isovue 370     COMPARISON: CT 11/16/2016, 10/27/2016 10/18/2016    HISTORY: abd pain diffuse;     FINDINGS:    Chest:   Bibasilar atelectasis.    Abdomen and pelvis:  Pneumobilia, presumed postprocedure. Fatty infiltration along the  subcapsular left hepatic lobe. Splenectomy. Pancreatectomy. The  adrenals are within normal limits. Unremarkable kidneys. No  hydronephrosis or renal calculus. Small calcified focus is adjacent to  the left ureter (series 5 image 236), unchanged and outside the  ureter. Mild prominence of both intrarenal collecting systems without  calyceal distention. Marked distention of the bladder.     Post surgical changes of Venessa-en-Y surgery. Mild mucosal enhancement  and nonspecific fluid in the small bowel. No bowel obstruction. No  pelvic mass. Hysterectomy. The appendix was not seen.    No ascites. No pneumatosis, portal venous gas or free air. No  abdominopelvic lymphadenopathy.    Bones and soft tissues:  No suspicious osseous findings. Chronic pars defect at L5 with grade 1  anterolisthesis of L5 over S1. Mild multilevel degenerative changes of  thoracolumbar spine.      Impression    IMPRESSION:     1. Mild mucosal enhancement and nonspecific fluid in the small bowel.  No bowel obstruction. Possible mild gastroenteritis.  2. Distention of the bladder as well as the mild distention of the  upper collecting systems without obvious hydronephrosis. Question  urinary retention.    I have personally reviewed the examination and initial interpretation  and I agree with the findings.    GUILLERMINA HOLT MD

## 2018-08-27 NOTE — PROVIDER NOTIFICATION
Dr. Severino notified that patient's BG is 32 & 26 and pending lab drawn glucose at this time. Seen by endocrine. D50% ampule given. D10 1/2 NS is infusing at 125mL/hr. Provider will speak to endocrinology, no new orders at this time.

## 2018-08-27 NOTE — H&P
[facility], [place]    Internal Medicine History and Physical - [facility] Service       Date of Admission:  8/26/2018    Chief Complaint   Abdominal Pain     History is obtained from the patient    History of Present Illness   Chantell Kidd is a 52 year old female  who has a history of chronic pancreatitis s/p pancreatectomy with auto islet transplantation, resultant T1DM, chronic abd pain, GERD, anxiety depression, who presents to ED with abd pain x1day  Patient notes abdominal pain that radiates to back. Unclear what brought on the pain, it gradually worsened through the day. Pain was so severe she was not able to eat. She was able to keep down liquids. No nausea or vomitting. Had BM this AM, no melena or hematochezia. Urinated here in ED, no hematuria, dysuria. States she has never had pain like this before. Has insulin pump, had it on her before she came to the ED. She does not give herself any additional insulin other than via the pump. Has had the pump for 5-6 years. Has had episode of hypoglycemia in the past which are similar to current symptoms. Noted dizziness, family noted loss of consciousness prompting a call to EMS.  No diaphoresis, episodes of hunger. No recent illness.     Drank alcohol on 8/26 afternoon. Had a few wine coolers. No known history of alcohol withdrawal.     Prior to admission family administer glucagon x2. EMS gave 1 amp D50, BG improved to 233 then dropped again to 125, then 93.     Pt has had insulin pump for 5 years with occasional episodes of hypoglycemia requiring ED visits, most recent episode 6 mo ago. Wears insulin pump on abdomen, changes site every 5-6 days, most recently changed 2 days ago. Denies any site infections/inflammations. Uses regular insulin through pump.    ED course:  1L NS bolus    D10 at 125ml/hr    KCl 10mEq in 100ml infusion    Review of Systems   The 10 point Review of Systems is negative other than noted in the HPI     Past Medical  History    I have reviewed this patient's medical history and updated it with pertinent information if needed.   Past Medical History:   Diagnosis Date     Chronic abdominal pain      Chronic pancreatitis (H)     S/P pancreatectomy     Depression with anxiety      Diabetes mellitus (H) 1/2012     Gastro-oesophageal reflux disease      Hypothyroidism 4/23/2015     Kidney stones      Low serum cortisol level (H)      Migraines      Other chronic pain     STOMACH     Other chronic pain     LUMBAR SPINE     Spasm of sphincter of Oddi         Past Surgical History   I have reviewed this patient's surgical history and updated it with pertinent information if needed.  Past Surgical History:   Procedure Laterality Date     ARTHROPLASTY CARPOMETACARPAL (THUMB JOINT)  5/2/2014    Procedure: ARTHROPLASTY CARPOMETACARPAL (THUMB JOINT);  Surgeon: Carina Panda MD;  Location: [facility] OR     CHOLECYSTECTOMY  2004     COLONOSCOPY  7/18/2014    Procedure: COLONOSCOPY;  Surgeon: Aurora Sahu MD;  Location: [facility]     COLONOSCOPY N/A 8/1/2017    Procedure: COLONOSCOPY;  Colonoscopy and upper endoscopy;  Surgeon: Deirdre Harris MD;  [facility]: [facility]     ENDOSCOPIC RETROGRADE CHOLANGIOPANCREATOGRAM       ENDOSCOPIC RETROGRADE CHOLANGIOPANCREATOGRAM  4/19/2011    Procedure:ENDOSCOPIC RETROGRADE CHOLANGIOPANCREATOGRAM; Pancreatic Stent Placement       ENDOSCOPIC RETROGRADE CHOLANGIOPANCREATOGRAM  5/26/2011    Procedure:ENDOSCOPIC RETROGRADE CHOLANGIOPANCREATOGRAM; with Pancreatic Stent Removal; Surgeon:DALE MIMS; Location:[facility]     ENDOSCOPY UPPER, COLONOSCOPY, COMBINED  4/25/2012    Procedure:COMBINED ENDOSCOPY UPPER, COLONOSCOPY; Enteroscopy with Bile Duct Stent Removal, Colonoscopy  *[facility] Safe Room*; Surgeon:GRACY GODWIN; Location[facility]     ESOPHAGOSCOPY, GASTROSCOPY, DUODENOSCOPY (EGD), COMBINED  5/26/2011    Procedure:COMBINED ESOPHAGOSCOPY, GASTROSCOPY, DUODENOSCOPY (EGD);  Surgeon:DALE MIMS; [facility]     ESOPHAGOSCOPY, GASTROSCOPY, DUODENOSCOPY (EGD), COMBINED N/A 10/30/2014    Procedure: COMBINED ESOPHAGOSCOPY, GASTROSCOPY, DUODENOSCOPY (EGD), BIOPSY SINGLE OR MULTIPLE;  Surgeon: Sarai Moon MD;  [facility]     ESOPHAGOSCOPY, GASTROSCOPY, DUODENOSCOPY (EGD), COMBINED Left 7/6/2015    Procedure: COMBINED ESOPHAGOSCOPY, GASTROSCOPY, DUODENOSCOPY (EGD), BIOPSY SINGLE OR MULTIPLE;  Surgeon: Thomas Estrada MD;  [facility]     ESOPHAGOSCOPY, GASTROSCOPY, DUODENOSCOPY (EGD), COMBINED N/A 7/8/2016    Procedure: COMBINED ESOPHAGOSCOPY, GASTROSCOPY, DUODENOSCOPY (EGD), BIOPSY SINGLE OR MULTIPLE;  Surgeon: Eloy Kelin MD;  [facility]     ESOPHAGOSCOPY, GASTROSCOPY, DUODENOSCOPY (EGD), COMBINED N/A 8/4/2016    Procedure: COMBINED ESOPHAGOSCOPY, GASTROSCOPY, DUODENOSCOPY (EGD), BIOPSY SINGLE OR MULTIPLE;  Surgeon: Jason Brown MD;  [facility]     ESOPHAGOSCOPY, GASTROSCOPY, DUODENOSCOPY (EGD), COMBINED N/A 8/1/2017    Procedure: COMBINED ESOPHAGOSCOPY, GASTROSCOPY, DUODENOSCOPY (EGD);;  Surgeon: Deirdre Harris MD;  Location:  GI     GYN SURGERY      Hysterectomy and USO     [facility] ENDOSCOPY W EUS  7/20/2011    Procedure:COMBINED ENDOSCOPIC ULTRASOUND, ESOPHAGOSCOPY, GASTROSCOPY, DUODENOSCOPY (EGD); Surgeon:DARVIN DONOHUE; [facility]     HERNIORRHAPHY VENTRAL N/A 9/15/2016    Procedure: HERNIORRHAPHY VENTRAL;  Surgeon: Juanita Bernabe MD;  [facility]     HYSTERECTOMY  1997 or 1998    USO     INCISION AND DRAINAGE ABDOMEN WASHOUT, COMBINED  8/16/2012    Procedure: COMBINED INCISION AND DRAINAGE ABDOMEN WASHOUT;  ,debridement and Drainage Post Appendectomy;  Surgeon: Ron Austin MD;  [facility]     INJECT TRANSVERSUS ABDOMINIS PLANE (TAP) BLOCK BILATERAL Bilateral 5/26/2016    Procedure: INJECT TRANSVERSUS ABDOMINIS PLANE (TAP) BLOCK BILATERAL;  Surgeon: Xena  Leonard MERCER MD;  [facility]     LAPAROSCOPIC APPENDECTOMY  7/30/2012    Procedure: LAPAROSCOPIC APPENDECTOMY;  Open Appendectomy;  Surgeon: Ron Austin MD;  [facility]     PANCREATECTOMY, TRANSPLANT AUTO ISLET CELL, COMBINED  1/6/2012    Procedure:COMBINED PANCREATECTOMY, TRANSPLANT AUTO ISLET CELL; Total  Pancreatectomy, Auto Islet Transplant, splenectomy, 18fr. transgastric-jejunal feeding tube placement, liver biopsy; Surgeon:PALAK LEE; [facility]     REPLACE GASTROSTOMY TUBE, PERCUTANEOUS N/A 8/30/2017    Procedure: REPLACE GASTROSTOMY TUBE, PERCUTANEOUS;  GJ Tube Change;  Surgeon: Jose Nath PA-C;  [facility]     SPLENECTOMY         Social History   Social History   Substance Use Topics     Smoking status: Never Smoker     Smokeless tobacco: Never Used     Alcohol use No   alcohol use occasional.   No illicit drugs.     Family History   I have reviewed this patient's family history and updated it with pertinent information if needed.   Family History   Problem Relation Age of Onset     Hypertension Mother      Diabetes Mother      Osteoperosis Mother      Cancer Father      pancreatic cancer     Diabetes Maternal Grandmother      Cardiovascular Maternal Grandmother      Cancer Maternal Grandfather      lung cancer     Cancer Sister      brain     Cancer Sister      liver cancer       Prior to Admission Medications   Prior to Admission Medications   Prescriptions Last Dose Informant Patient Reported? Taking?   ACCU-CHEK MULTICLIX LANCETS MISC  Self No No   Sig: by Lancet route. Use to test blood sugar daily or as directed.   DULoxetine (CYMBALTA) 30 MG EC capsule   No No   Sig: Take 1 capsule (30 mg) by mouth daily With 60mg capsule for total dose of 90mg   DULoxetine (CYMBALTA) 60 MG EC capsule   No No   Sig: Take 1 capsule (60 mg) by mouth daily With 30mg capsule for total dose of 90mg   Nutritional Supplements (BOOST HIGH PROTEIN) LIQD  Self No No   Sig:  Also can use Ensure clear (available over the counter)   SUMAtriptan (IMITREX) 50 MG tablet  Self No No   Sig: Take 1 tablet (50 mg) by mouth at onset of headache for migraine Take 1 Tab by mouth Once as needed for Migraine Headache. May repeat after two hours.  Maximum dose 200 mg/24 hours.   acetaminophen 500 MG CAPS  Self Yes No   Sig: Take 1,000 mg by mouth three times a day as needed.   alendronate (FOSAMAX) 70 MG tablet  Self No No   Sig: Take 1 tablet (70 mg) by mouth every 7 days On Sundays take first thing in the morning with plain water and remain upright for at least 30 minutes and until after first food of the day    Do not restart Fosamax (alendronate) until your difficulty swallowing has resolved and you have finished the entire course of fluconazole (Diflucan).   alum & mag hydroxide-simethicone (GELUSIL) 200-200-25 MG CHEW chewable tablet   No No   Sig: CHEW AND SWALLOW 2 CHEWS EVERY 4 HOURS AS NEEDED FOR INDIGESTION   amylase-lipase-protease (CREON 12) 73762 UNITS CPEP   No No   Sig: Take 6 capsules with meals and 3 with snacks.  This is up to 24 capsules per day.   aspirin 81 MG tablet  Self Yes No   Sig: Take 81 mg by mouth daily.   blood glucose monitoring (NOEMI CONTOUR NEXT) test strip   No No   Sig: Use to test blood sugar 8 times daily.   blood glucose monitoring (NO BRAND SPECIFIED) test strip  Self No No   Sig: Use to test blood glucoses 6-8 times per day.   cyclobenzaprine (FLEXERIL) 5 MG tablet   No No   Sig: Take 1 tablet (5 mg) by mouth 3 times daily as needed for muscle spasms   diclofenac (VOLTAREN) 1 % GEL  Self Yes No   Si g Apply 2 g to skin four times a day as needed (to affected upper extremity joint(s)). Maximum 8g/day per joint, 16g/day total.   dicyclomine (BENTYL) 10 MG capsule   No No   Sig: TAKE ONE CAPSULE BY MOUTH EVERY 6 HOURS AS NEEDED   docusate sodium (DOCQLACE) 100 MG capsule   No No   Sig: Take 1 capsule (100 mg) by mouth daily as needed for constipation    dronabinol (MARINOL) 2.5 MG capsule   No No   Sig: Take 2 capsules (5 mg) by mouth 2 times daily as needed   fluconazole (DIFLUCAN) 200 MG tablet   No No   Sig: Take 2 tabs the first day and 1 tab for the next 13 days   furosemide (LASIX) 20 MG tablet   No No   Sig: Take 1 tablet (20 mg) by mouth 2 times daily   glucagon (GLUCAGON EMERGENCY) 1 MG kit   No No   Sig: Inject 1 mg into the muscle once for 1 dose   hydrocortisone (CORTEF) 10 MG tablet   No No   Sig: Take 10 mg in the morning and 10 mg at bedtime. Watch for hypoglycemia recurrence.   insulin aspart (NOVOLOG VIAL) 100 UNITS/ML VIAL  Self No No   Sig: As directed in pump   insulin pen needle needle  Self No No   Sig: RX# [id number]   Pen Needle UC 31G UF IV Mini   Use 4-8 needles per day for insulin injections.       levothyroxine (SYNTHROID/LEVOTHROID) 112 MCG tablet   No No   Sig: Take 1 tablet (112 mcg) by mouth daily   linaclotide (LINZESS) 145 MCG capsule   No No   Sig: Take 1 capsule (145 mcg) by mouth every morning (before breakfast)   metoclopramide (REGLAN) 5 MG tablet   No No   Sig: Take 2 tablets (10 mg) by mouth 3 times daily (before meals   nystatin (MYCOSTATIN) 22319 unit/0.5mL SUSP   No No   Sig: Take 1 mL (100,000 Units) by mouth 4 times daily   ondansetron (ZOFRAN-ODT) 4 MG ODT tab   No No   Sig: DISSOLVE ONE TABLET ON THE TONGUE EVERY 6 HOURS AS NEEDED FOR NAUSEA AND VOMITING   pantoprazole (PROTONIX) 40 MG enteric coated tablet  Self No No   Sig: Take 1 tablet (40 mg) by mouth 2 times daily   polyethylene glycol (MIRALAX/GLYCOLAX) packet  Self Yes No   Sig: Take 1 packet by mouth 2 times daily as needed for constipation    potassium chloride SA (K-DUR/KLOR-CON M) 20 MEQ CR tablet   No No   Sig: Take 1 tablet (20 mEq) by mouth daily   pregabalin (LYRICA) 150 MG capsule   No No   Sig: Take 1 capsule (150 mg) by mouth 3 times daily   senna (SENNA LAX) 8.6 MG tablet   No No   Sig: Take 1-2 tablets by mouth daily as needed for constipation    senna (SENNA LAX) 8.6 MG tablet   No No   Sig: TAKE 1-2 TABLETS BY MOUTH ONCE DAILY AS NEEDED FOR CONSTIPATION   sodium bicarbonate 650 MG tablet   No No   Sig: Take one-half tablet (325 mg), via feeding tube every 4 hours.   topiramate (TOPAMAX) 100 MG tablet   No No   Sig: Take 1 tablet (100 mg) by mouth 2 times daily   traZODone (DESYREL) 100 MG tablet   No No   Sig: TAKE 1-2 TABLETS BY MOUTH AN HOUR BEFORE BEDTIME      Facility-Administered Medications: None     Allergies   Allergies   Allergen Reactions     Corticosteroids Other (See Comments)     All oral,IV and injectable steroids are contraindicated (unless in life threatening situations) in Islet Auto transplant recipients. They can cause irreversible loss of islet cell function. Please contact patients transplant care coordinator Malini ORDAZ @[phone]/Pager [phone]  and Endocrinologist prior to administration.     Chocolate Flavor Rash     Breaks out when eats chocolate       Physical Exam   Vital Signs: Temp: 97.2  F (36.2  C) Temp src: Oral BP: 109/66   Heart Rate: 76 Resp: 12 SpO2: 96 % O2 Device: None (Room air)    Weight: 135 lbs 0 oz    General: AAOx3, NAD  HEENT: sclera anicteric, conjunctivae pink and clear, MMM, no oropharynx erythema  CV: RRR, normal S1S2, no murmur, clicks, rubs  Resp: Clear to auscultation bilaterally, no wheezes, rhonchi  Abd: soft, nondistended, +ve tenderness in b/l lower quadrants, no hepatosplenomegaly  Extremities: Radial and pedal pulses intact and symmetric, no pedal edema  Neuro: No lateralizing symptoms or focal neurologic deficits      Assessment & Plan   Chantell Kidd is a 52 year old female w/ pmhx of chronic pancreatitis s/p islet auto transplantation resultant in T1DM, chronic abd pain, presents with 1 day of abd pain and loss of consciousness per family, found to have hypoglycemia in 40s    # Hypoglycemia   # Hypokalemia  Pt uses insulin pump for 5-6 yrs, with occasional hypoglycemic episodes  resulting in ED visits. She had poor appetite over the course of day due to abd pain but had her insulin pump on throughout entire day. She bolused herself with insulin through the pump when BG >300, then presented with current sxs. Pt states that she feels similar to previous episodes of hypoglycemia. ED BG 49, s/p D10 infusion in ED, witnessed BG recheck 112, pt still felt weak and shaky. K+ 2.9 on admission, baseline normal. Likely 2/2 insulin infusion in setting of poor PO intake. Hypoglycemia and hypokalemia likely both 2/2 insulin overdose  - No insulin pump seen, taken off by family. Call family to bring in pump, obtain setting  - Continue with D10, BG check Q1hr, initiate hypoglycemia protocol  - may start CHO consistent diet  - PO K+ 40mEq + 20mEq K iv IV, recheck in AM    # Abdominal Pain   H/o chronic abd pain. CT prelim read +ve bowel edema. H/o chronic pancreatitis s/p islet auto-transplant. Recent etoh consumption, cannot r/o pancreatitis exacerbation. CT final read pending. Prelim +ve bowel edema, no mentioning about acute inflammation in pancreas. Abd pain likely exacerbated by current episode of hypoglycemia  - f/u CT final read  - may start PO feed as above    # S/p panacreatectomy with autoislet cell transplant   # Type 1 DM   Has bee on insulin pump for 5-6yrs, occasional hypoglycemic episodes resulting in ED visits. Most recent episode 6 months ago  - obtain pump setting  - endocrine consult for pump management   - currently not on insulin, will need to adjust for basal requirement once hypoglycemia corrected    Chronic Medical Problems   CIERRA: PTA cymbalta, HOLD trazodone until mental status baseline  Hypothyroid: PTA levothyroxine  Adrenal Insufficiency: continue pta hydrocortisone  Chronic Pain: continue pta tylenol, simethicone. HOLD dicyclomine until proven no bowel obstruction    # Pain Assessment:  Current Pain Score 8/30/2017   Patient currently in pain? yes   Pain score (0-10) -   Pain  location Abdomen   Pain descriptors Sore   - Chantell is experiencing pain due to chronic abd pain exacerbated by hypoglycemia. Pain management was discussed and the plan was created in a collaborative fashion.  Chantell's response to the current recommendations: engaged  - Pharmacologic adjuvants: Acetaminophen    Diet:  consistent CHO  Fluids: D10 infusion  DVT Prophylaxis: Pneumatic Compression Devices  Code Status: Full Code    Disposition Plan   Expected discharge: 2 - 3 days; recommended to prior living arrangement once mental status at baseline.    Hilda [facility]   Pager: [phone]  Please see sticky note for cross cover information      Data   Data     Recent Labs  Lab 08/26/18  2236   WBC 18.6*   HGB 11.8   MCV 90         POTASSIUM 2.5*   CHLORIDE 107   CO2 22   BUN 7   CR 0.59   ANIONGAP 12   ELIZA 8.3*   GLC 69*   ALBUMIN 3.7   PROTTOTAL 7.2   BILITOTAL 0.2   ALKPHOS 106   ALT 43   AST 35   LIPASE 36*

## 2018-08-27 NOTE — PLAN OF CARE
Problem: Patient Care Overview  Goal: Plan of Care/Patient Progress Review  Outcome: No Change  Neuro: A&Ox4.   Cardiac: SR. VSS.   Respiratory: Sating 95 on RA.  GI/: Adequate urine output.  Diet/appetite: Tolerating regular diet. Eating well.  Activity:  Assist of 1,   Pain: At acceptable level on current regimen. Tylenol x1.  Skin: No new deficits noted.  LDA's: 2 PIV SL  Lowest BS of 11 63 and 20 with 3 amps D50 given. D10 .45 NaCl ordered, not arrived from pharmacy yet. Monitor BS closely. MD notified x3 (maroon overnight) but no change to treatment plan. Stat lab draw ordered at 7am. VSS and A and O x4 with low sugars. Diaphoretic with low sigars     Plan: Continue with POC. Notify primary team with changes.      Problem: Diabetes Comorbidity  Goal: Diabetes  Patient comorbidity will be monitored for signs and symptoms of hyperglycemia or hypoglycemia. Problems will be absent, minimized or managed by discharge/transition of care.  Outcome: No Change  Low sugars,  Monitor closely

## 2018-08-27 NOTE — ED NOTES
BG 89 after returning from CT scan. Pt. resting but awakens easily to voice. Able to ambulate to bathroom, SBA. AVSS on RA. Pt. requesting pain meds for abdominal and back pain.

## 2018-08-27 NOTE — ED NOTES
[facility], [place]   ED Nurse to Floor Handoff     Chantell Kidd is a 52 year old female who speaks English and lives with family members,  in a home  They arrived in the ED by ambulance from home    ED Chief Complaint: Hypoglycemia    ED Dx;   Final diagnoses:   Hypoglycemia         Needed?: No    Allergies:   Allergies   Allergen Reactions     Corticosteroids Other (See Comments)     All oral,IV and injectable steroids are contraindicated (unless in life threatening situations) in Islet Auto transplant recipients. They can cause irreversible loss of islet cell function. Please contact patients transplant care coordinator Malini Julien RN BSN @[phone]/Pager [phone]  and Endocrinologist prior to administration.     Chocolate Flavor Rash     Breaks out when eats chocolate   .  Past Medical Hx:   Past Medical History:   Diagnosis Date     Chronic abdominal pain      Chronic pancreatitis (H)     S/P pancreatectomy     Depression with anxiety      Diabetes mellitus (H) 1/2012     Gastro-oesophageal reflux disease      Hypothyroidism 4/23/2015     Kidney stones      Low serum cortisol level (H)      Migraines      Other chronic pain     STOMACH     Other chronic pain     LUMBAR SPINE     Spasm of sphincter of Oddi       Baseline Mental status: other + ETOH  Current Mental Status changes: other drowsy, arousable    Infection present or suspected this encounter: no  Sepsis suspected: No  Isolation type: No active isolations     Activity level - Baseline/Home:  Stand with Assist  Activity Level - Current:   Stand with Assist    Bariatric equipment needed?: No    In the ED these meds were given:   Medications   0.9% sodium chloride BOLUS (1,000 mLs Intravenous New Bag 8/27/18 0002)   dextrose 10% infusion ( Intravenous Rate/Dose Change 8/27/18 0043)   dextrose 50 % injection 25 mL (25 mLs Intravenous Given 8/26/18 8735)   iopamidol (ISOVUE-370) solution 83 mL (83 mLs  Intravenous Given 8/26/18 2350)   sodium chloride 0.9 % bag 500mL for CT scan flush use (75 mLs Intravenous Given 8/26/18 2351)   potassium chloride 10 mEq in 100 mL sterile water intermittent infusion (premix) (10 mEq Intravenous New Bag 8/27/18 0002)       Drips running?  Yes    Home pump  No    Current LDAs  Peripheral IV 08/26/18 Right Lower forearm (Active)   Site Assessment WDL 8/26/2018 10:42 PM   Number of days:1       Gastrostomy/Enterostomy Gastrojejunostomy LUQ 1 16 fr (Active)   Number of days:362       Incision/Surgical Site 09/15/16 Bilateral Abdomen (Active)   Number of days:711       Labs results:   Labs Ordered and Resulted from Time of ED Arrival Up to the Time of Departure from the ED   GLUCOSE BY METER - Abnormal; Notable for the following:        Result Value    Glucose 133 (*)     All other components within normal limits   CBC WITH PLATELETS DIFFERENTIAL - Abnormal; Notable for the following:     WBC 18.6 (*)     Absolute Neutrophil 15.0 (*)     All other components within normal limits   COMPREHENSIVE METABOLIC PANEL - Abnormal; Notable for the following:     Potassium 2.5 (*)     Glucose 69 (*)     Calcium 8.3 (*)     All other components within normal limits   LIPASE - Abnormal; Notable for the following:     Lipase 36 (*)     All other components within normal limits   LACTIC ACID WHOLE BLOOD - Abnormal; Notable for the following:     Lactic Acid 3.8 (*)     All other components within normal limits   ROUTINE UA WITH MICROSCOPIC REFLEX TO CULTURE - Abnormal; Notable for the following:     Glucose Urine 70 (*)     Leukocyte Esterase Urine Small (*)     Mucous Urine Present (*)     All other components within normal limits   GLUCOSE BY METER - Abnormal; Notable for the following:     Glucose 31 (*)     All other components within normal limits   GLUCOSE BY METER - Abnormal; Notable for the following:     Glucose 49 (*)     All other components within normal limits   ALCOHOL BREATH TEST POCT  "- Abnormal; Notable for the following:     Alcohol Breath Test 0.194 (*)     All other components within normal limits   GLUCOSE BY METER       Imaging Studies:   Recent Results (from the past 24 hour(s))   CT Abdomen Pelvis w Contrast    Impression    IMPRESSION:   Mild mucosal enhancement and nonspecific fluid in the small bowel. No  bowel obstruction. Possible mild gastroenteritis.       Recent vital signs:   /66  Temp 97.2  F (36.2  C) (Oral)  Resp 12  Ht 1.575 m (5' 2\")  Wt 61.2 kg (135 lb)  SpO2 96%  BMI 24.69 kg/m2    Cardiac Rhythm: Normal Sinus  Pt needs tele? Yes  Skin/wound Issues: None    Code Status: Full Code    Pain control: good    Nausea control: good    Abnormal labs/tests/findings requiring intervention: hypoglycemia, hypokalemia    Family present during ED course? No   Family Comments/Social Situation comments: son had called to update from home.  Insulin pump had been removed prior to EMS transport.    Tasks needing completion: frequent BG checks. 0140 POC result: 112    Steffi Hobbs RN    [phone] [facility]      "

## 2018-08-27 NOTE — ED PROVIDER NOTES
History     Chief Complaint   Patient presents with     Hypoglycemia     HPI  Chantell Kidd is a 52 year old female with history of chronic pancreatitis status post pancreatectomy with auto islet transplantation, resultant type 1 diabetes, chronic abdominal pain, GERD, anxiety, depression who presents to the emergency department today for evaluation of abdominal pain.  The patient reports over the last couple days she has been experiencing progressively worsening pain diffuse in her abdomen but worse in the upper abdomen movement which radiates through to her back.  She states that she has experienced similar pain in the past but does not recall what it was due to.  She states that she does not know why she could be experiencing this pain currently.  The patient does admit that she drank alcohol tonight with her , states that she had to wine coolers.  She states that she does not believe that the alcohol could be contributing to her current pain.  The patient denies any change in urinary habit.  She states that stool habit has been normal, last bowel movement was earlier today and was regular.  The patient does report that she comes by ambulance and that paramedics checked her blood glucose en route and found this somewhat low.      No current facility-administered medications for this encounter.      Current Outpatient Prescriptions   Medication     ACCU-CHEK MULTICLIX LANCETS MISC     acetaminophen 500 MG CAPS     alendronate (FOSAMAX) 70 MG tablet     alum & mag hydroxide-simethicone (GELUSIL) 200-200-25 MG CHEW chewable tablet     amylase-lipase-protease (CREON 12) 79692 UNITS CPEP     aspirin 81 MG tablet     blood glucose monitoring (NOEMI CONTOUR NEXT) test strip     blood glucose monitoring (NO BRAND SPECIFIED) test strip     cyclobenzaprine (FLEXERIL) 5 MG tablet     diclofenac (VOLTAREN) 1 % GEL     dicyclomine (BENTYL) 10 MG capsule     docusate sodium (DOCQLACE) 100 MG capsule     dronabinol  (MARINOL) 2.5 MG capsule     DULoxetine (CYMBALTA) 30 MG EC capsule     DULoxetine (CYMBALTA) 60 MG EC capsule     fluconazole (DIFLUCAN) 200 MG tablet     furosemide (LASIX) 20 MG tablet     glucagon (GLUCAGON EMERGENCY) 1 MG kit     hydrocortisone (CORTEF) 10 MG tablet     insulin aspart (NOVOLOG VIAL) 100 UNITS/ML VIAL     insulin pen needle needle     levothyroxine (SYNTHROID/LEVOTHROID) 112 MCG tablet     linaclotide (LINZESS) 145 MCG capsule     metoclopramide (REGLAN) 5 MG tablet     Nutritional Supplements (BOOST HIGH PROTEIN) LIQD     nystatin (MYCOSTATIN) 84634 unit/0.5mL SUSP     ondansetron (ZOFRAN-ODT) 4 MG ODT tab     pantoprazole (PROTONIX) 40 MG enteric coated tablet     polyethylene glycol (MIRALAX/GLYCOLAX) packet     potassium chloride SA (K-DUR/KLOR-CON M) 20 MEQ CR tablet     pregabalin (LYRICA) 150 MG capsule     senna (SENNA LAX) 8.6 MG tablet     senna (SENNA LAX) 8.6 MG tablet     sodium bicarbonate 650 MG tablet     SUMAtriptan (IMITREX) 50 MG tablet     topiramate (TOPAMAX) 100 MG tablet     traZODone (DESYREL) 100 MG tablet     Past Medical History:   Diagnosis Date     Chronic abdominal pain      Chronic pancreatitis (H)     S/P pancreatectomy     Depression with anxiety      Diabetes mellitus (H) 1/2012     Gastro-oesophageal reflux disease      Hypothyroidism 4/23/2015     Kidney stones      Low serum cortisol level (H)      Migraines      Other chronic pain     STOMACH     Other chronic pain     LUMBAR SPINE     Spasm of sphincter of Oddi        Past Surgical History:   Procedure Laterality Date     ARTHROPLASTY CARPOMETACARPAL (THUMB JOINT)  5/2/2014    Procedure: ARTHROPLASTY CARPOMETACARPAL (THUMB JOINT);  Surgeon: Carina Panda MD;  Location: [facility]     CHOLECYSTECTOMY  2004     COLONOSCOPY  7/18/2014    Procedure: COLONOSCOPY;  Surgeon: Aurora Sahu MD;  Location: [facility]     COLONOSCOPY N/A 8/1/2017    Procedure: COLONOSCOPY;  Colonoscopy and upper  endoscopy;  Surgeon: Deirdre Harris MD;  Location: [facility]     ENDOSCOPIC RETROGRADE CHOLANGIOPANCREATOGRAM       ENDOSCOPIC RETROGRADE CHOLANGIOPANCREATOGRAM  4/19/2011    Procedure:ENDOSCOPIC RETROGRADE CHOLANGIOPANCREATOGRAM; Pancreatic Stent Placement       ENDOSCOPIC RETROGRADE CHOLANGIOPANCREATOGRAM  5/26/2011    Procedure:ENDOSCOPIC RETROGRADE CHOLANGIOPANCREATOGRAM; with Pancreatic Stent Removal; Surgeon:DALE MIMS; Location:[facility]     ENDOSCOPY UPPER, COLONOSCOPY, COMBINED  4/25/2012    Procedure:COMBINED ENDOSCOPY UPPER, COLONOSCOPY; Enteroscopy with Bile Duct Stent Removal, Colonoscopy  *[facility] Safe Room*; Surgeon:GRACY GODWINIQ; Location[facility]     ESOPHAGOSCOPY, GASTROSCOPY, DUODENOSCOPY (EGD), COMBINED  5/26/2011    Procedure:COMBINED ESOPHAGOSCOPY, GASTROSCOPY, DUODENOSCOPY (EGD); Surgeon:DALE MIMS; [facility]     ESOPHAGOSCOPY, GASTROSCOPY, DUODENOSCOPY (EGD), COMBINED N/A 10/30/2014    Procedure: COMBINED ESOPHAGOSCOPY, GASTROSCOPY, DUODENOSCOPY (EGD), BIOPSY SINGLE OR MULTIPLE;  Surgeon: Sarai Moon MD;  [facility]: [facility]     ESOPHAGOSCOPY, GASTROSCOPY, DUODENOSCOPY (EGD), COMBINED Left 7/6/2015    Procedure: COMBINED ESOPHAGOSCOPY, GASTROSCOPY, DUODENOSCOPY (EGD), BIOPSY SINGLE OR MULTIPLE;  Surgeon: Thomas Estrada MD;  [facility]: [facility]     ESOPHAGOSCOPY, GASTROSCOPY, DUODENOSCOPY (EGD), COMBINED N/A 7/8/2016    Procedure: COMBINED ESOPHAGOSCOPY, GASTROSCOPY, DUODENOSCOPY (EGD), BIOPSY SINGLE OR MULTIPLE;  Surgeon: Eloy Klein MD;  [facility]: [facility]     ESOPHAGOSCOPY, GASTROSCOPY, DUODENOSCOPY (EGD), COMBINED N/A 8/4/2016    Procedure: COMBINED ESOPHAGOSCOPY, GASTROSCOPY, DUODENOSCOPY (EGD), BIOPSY SINGLE OR MULTIPLE;  Surgeon: Jason Brown MD;  [facility]: [facility]     ESOPHAGOSCOPY, GASTROSCOPY, DUODENOSCOPY (EGD), COMBINED N/A 8/1/2017    Procedure: COMBINED ESOPHAGOSCOPY, GASTROSCOPY, DUODENOSCOPY (EGD);;  Surgeon:  Deirdre Harris MD;  Location: [facility]     GYN SURGERY      Hysterectomy and USO     [facility] ENDOSCOPY W EUS  7/20/2011    Procedure:COMBINED ENDOSCOPIC ULTRASOUND, ESOPHAGOSCOPY, GASTROSCOPY, DUODENOSCOPY (EGD); Surgeon:DARVIN DONOHUE; Location[facility]     HERNIORRHAPHY VENTRAL N/A 9/15/2016    Procedure: HERNIORRHAPHY VENTRAL;  Surgeon: Juanita Bernabe MD;  [facility]     HYSTERECTOMY  1997 or 1998    USO     INCISION AND DRAINAGE ABDOMEN WASHOUT, COMBINED  8/16/2012    Procedure: COMBINED INCISION AND DRAINAGE ABDOMEN WASHOUT;  ,debridement and Drainage Post Appendectomy;  Surgeon: Ron Austin MD;  [facility]     INJECT TRANSVERSUS ABDOMINIS PLANE (TAP) BLOCK BILATERAL Bilateral 5/26/2016    Procedure: INJECT TRANSVERSUS ABDOMINIS PLANE (TAP) BLOCK BILATERAL;  Surgeon: Leonard Mccallum MD;  [facility]     LAPAROSCOPIC APPENDECTOMY  7/30/2012    Procedure: LAPAROSCOPIC APPENDECTOMY;  Open Appendectomy;  Surgeon: Ron Austin MD;  [facility]     PANCREATECTOMY, TRANSPLANT AUTO ISLET CELL, COMBINED  1/6/2012    Procedure:COMBINED PANCREATECTOMY, TRANSPLANT AUTO ISLET CELL; Total  Pancreatectomy, Auto Islet Transplant, splenectomy, 18fr. transgastric-jejunal feeding tube placement, liver biopsy; Surgeon:PALAK LEE; [facility]     REPLACE GASTROSTOMY TUBE, PERCUTANEOUS N/A 8/30/2017    Procedure: REPLACE GASTROSTOMY TUBE, PERCUTANEOUS;  GJ Tube Change;  Surgeon: Jose Nath PA-C;  [facility]     SPLENECTOMY         Family History   Problem Relation Age of Onset     Hypertension Mother      Diabetes Mother      Osteoperosis Mother      Cancer Father      pancreatic cancer     Diabetes Maternal Grandmother      Cardiovascular Maternal Grandmother      Cancer Maternal Grandfather      lung cancer     Cancer Sister      brain     Cancer Sister      liver cancer       Social History   Substance Use Topics     Smoking status: Never  "Smoker     Smokeless tobacco: Never Used     Alcohol use No     Allergies   Allergen Reactions     Corticosteroids Other (See Comments)     All oral,IV and injectable steroids are contraindicated (unless in life threatening situations) in Islet Auto transplant recipients. They can cause irreversible loss of islet cell function. Please contact patients transplant care coordinator Malini ORDAZ @[phone]/Pager [phone]  and Endocrinologist prior to administration.     Chocolate Flavor Rash     Breaks out when eats chocolate         I have reviewed the Medications, Allergies, Past Medical and Surgical History, and Social History in the Epic system.    Review of Systems   Constitutional: Negative for fever.   Respiratory: Negative for shortness of breath.    Cardiovascular: Negative for chest pain.   Gastrointestinal: Positive for abdominal pain.   All other systems reviewed and are negative.      Physical Exam   BP: 104/67  Heart Rate: 84  Temp: 97.2  F (36.2  C)  Resp: 16  Height: 157.5 cm (5' 2\")  Weight: 61.2 kg (135 lb)  SpO2: 99 %      Physical Exam   Constitutional: She is oriented to person, place, and time. She appears well-developed and well-nourished. No distress.   Intoxicated     HENT:   Head: Normocephalic and atraumatic.   Eyes: No scleral icterus.   Neck: Normal range of motion. Neck supple.   Cardiovascular: Normal rate.    Pulmonary/Chest: Effort normal. No respiratory distress.   Abdominal:   Generalized abd discomfort   Neurological: She is alert and oriented to person, place, and time.   Skin: Skin is warm and dry. No rash noted. She is not diaphoretic. No erythema. No pallor.       ED Course     ED Course     Procedures             Critical Care time:  none     The Lactic acid level is elevated due to intoxication, at this time there is no sign of severe sepsis or septic shock.       Labs Ordered and Resulted from Time of ED Arrival Up to the Time of Departure from the ED   GLUCOSE " BY METER - Abnormal; Notable for the following:        Result Value    Glucose 133 (*)     All other components within normal limits   CBC WITH PLATELETS DIFFERENTIAL - Abnormal; Notable for the following:     WBC 18.6 (*)     Absolute Neutrophil 15.0 (*)     All other components within normal limits   COMPREHENSIVE METABOLIC PANEL - Abnormal; Notable for the following:     Potassium 2.5 (*)     Glucose 69 (*)     Calcium 8.3 (*)     All other components within normal limits   LIPASE - Abnormal; Notable for the following:     Lipase 36 (*)     All other components within normal limits   LACTIC ACID WHOLE BLOOD - Abnormal; Notable for the following:     Lactic Acid 3.8 (*)     All other components within normal limits   ROUTINE UA WITH MICROSCOPIC REFLEX TO CULTURE - Abnormal; Notable for the following:     Glucose Urine 70 (*)     Leukocyte Esterase Urine Small (*)     Mucous Urine Present (*)     All other components within normal limits   GLUCOSE BY METER - Abnormal; Notable for the following:     Glucose 31 (*)     All other components within normal limits   GLUCOSE BY METER - Abnormal; Notable for the following:     Glucose 49 (*)     All other components within normal limits   ALCOHOL BREATH TEST POCT - Abnormal; Notable for the following:     Alcohol Breath Test 0.194 (*)     All other components within normal limits   GLUCOSE BY METER            Assessments & Plan (with Medical Decision Making)   This is a 52-year-old female patient coming into emergency room after being found down at her home hypoglycemic.  Her family took her insulin pump off of her and had her sent into the emergency room.  Evidently she had been drinking alcohol today and is not complaining of abdominal discomfort.  She is otherwise hemodynamically stable.  She is extremely poor historian due to her intoxication.  Her abdomen is otherwise soft and nontender throughout.  Labs were drawn which shows that she is hypoglycemic, she has an  elevated lactic acid which is most likely associated with her alcohol abuse.  Her potassium is 2.5 which is most likely associated with her nutrition.  She has a mild leukocytosis which is unexplained at this time and may be associated with her gastroenteritis.  Abdominal CT was performed which showed some mild mucosal enhancement with a possible diagnosis of mild gastroenteritis.  The rest of her labs are for the most part unremarkable.  At this time she will be admitted to the medicine service.  She is provided with IV glucose as well as an glucose infusion.  She did not receive any pain medicines due to fact that she is inebriated.    I have reviewed the nursing notes.    I have reviewed the findings, diagnosis, plan and need for follow up with the patient.    New Prescriptions    No medications on file       Final diagnoses:   Hypoglycemia     IBhargav, am serving as a trained medical scribe to document services personally performed by Jerry Rivera MD, based on the provider's statements to me.      Jerry OLSEN MD, was physically present and have reviewed and verified the accuracy of this note documented by Bhargav Del Toro.      8/26/2018   [facility], [place], EMERGENCY DEPARTMENT     Jerry Rivera MD  08/27/18 0135

## 2018-08-27 NOTE — PROGRESS NOTES
Endocrinology Consult     Chantell Kidd MRN:[id number] YOB: 1965  Date of Admission:8/26/2018  Primary care provider: Ron Morgan  Reason for visit/consult: Hypoglycemia  Requesting physician: Dr. Severino           Assessment and Plan:   Chantell Kidd is a 52 year old female with PMHx of chronic pancreatitis s/p TPIAT, nomi-en-Y, and splenectomy in 2012, IDDM on insulin pump, recurrent severe hypoglycemia, hypothyroidism, BRENDAN, prior GJ, and possible adrenal insufficiency who presented on 8/26/18 with severe hypoglycemia.    # Severe, recurrent hypoglycemia  Patient has history of these severe hypoglycemic episodes. This current episode led to unresponsiveness and needing help from family and EMS. She had a confirmed low serum glucose of 37 this morning. She has hypoglycemic unawareness as well. During previous admissions for this, her C-peptide has either been low or undetectable, with insulin levels being high - this picture fits excess exogenous insulin.  This current hypoglycemic episode is likely multifactorial in the setting of alcohol use, weight loss, poor appetite with low glycogen stores, and being more active the day of admission.  Given her weight loss, her insulin needs have likely declined. She may have some beta-cell function from her islet cell transplant. In the setting of continued D10 infusion, this would lead to continued insulin secretion from these cells, which may prevent her blood sugars from rising as you would expect and could potentially cause some reactive lows. Complicating matters, patient is insulin-dependent and, if possible, ideally should get some insulin today. Again, she does appear to have some intact beta-cell function, so her risk of DKA off insulin should be lower than a true type 1 diabetic.  Given hypoglycemia, an insulin infusion would be safest as it could be easily shutoff and the insulin cleared from the system.  - Increase hydrocortisone to 20mg  BID as a temporizing measure, plan to resume hydrocortisone 10mg BID tomorrow  - Goal to wean off of D10 infusion today. Even if blood sugars are not high, try to wean off of D10   - Encourage PO intake  - Close monitoring of blood sugars   - If blood sugars > 120, start insulin infusion    # IDDM on home insulin pump  Patient with hx of TPIAT with reported beta-cell function, but she is reliant on daily insulin. She has detectable c-peptide levels, most recently 02/2018. Follows with Dr. Maher in clinic. Hypoglycemia as above  - If blood sugars > 120, start insulin infusion  - Check c-peptide. Ordered to add-on to most recent serum glucose. Could be helpful in seeing if she continues to have beta-cell function, which seems likely. Would expect it to be suppressed in setting of hypoglycemia, however with D10 infusion, this may not be the case    # Possible secondary adrenal insufficiency  Patient originally diagnosed in 03/2016 with possible opioid-related secondary AI, however this diagnosis is questioned by her primary endocrinologist. She has remained on 10mg hydrocortisone BID since 2016  - Increased hydrocortisone as above    Patient seen and examined with staff Dr. Kati Ribera MD  PGY4, Endocrinology Fellow  Pager: [phone]      HPI:  Chantell Kidd is a 52 year old female with PMHx of chronic pancreatitis s/p TPIAT, nomi-en-Y, and splenectomy in 2012, IDDM on insulin pump, recurrent severe hypoglycemia, hypothyroidism, BRENDAN, prior GJ, and possible secondary adrenal insufficiency who presented on 8/26/18 with severe hypoglycemia.    Patient found unresponsive by her  last night and was given glucagon x2 with minimal response. EMS arrived and gave D50 amp with reported , then 20 minutes later 125, then 20 minutes later 93.  Her insulin pump was disconnected before coming to the hospital. She doesn't really remember much of this.  On review of yesterday, patient reports feeling ok yesterday.  She woke up with , pre-lunch of 221, and 6PM was 161.  Her last meal was lunch (caesar salad). She reported not feeling hungry for dinner, which is not uncommon for her. She was more active yesterday evening, taking a walk with her . She also reports having 3-4 wine coolers yesterday, which is not typical for her. Her last drink prior to this was 6 months ago.     Since being in the hospital, patient has continued low blood sugars, as low as 20s-40s with reportedly no symptoms. A fingerstick BG of 32 was confirmed to be 37 on serum this morning.  She has been on a D10 gtt at 125cc/hr with continued lows on this. Her appetite is poor and has been for a while now, and she often feels like she has to force herself to eat.  She is on topirmate, but doesn't think that her appetite is more suppressed on this medication. She has lost 11 pounds since 05/2018.    She is currently reporting abdominal pain that radiates to her left and back.  Her initial lipase was unremarkable. A CT abd/pelv with contrast showed possible gastritis, but was otherwise unrevealing.     Patient follows with Dr. Maher in pediatric endo and last saw her in 05/2018. She has known hypoglycemic unawareness and difficulty with hypoglycemic episodes. See Dr. Maher's 05/2018 note for further details. Of note, patient wears a dexcom sensor that alerts her to lows.  Patient states she has had 2-3 lows over the past month, lowest in the 40s.      Current insulin pump settings (unchanged since 05/2018 endo clinic visit):  - basal rate: 0.6 units/hr for 24 hours  - carb coverage: 1 unit per 40 grams of carb  - sensitivity: 80mg/dL with targets  mg/dL  - Patient does not have her pump with her to confirm these settings    Adrenal insufficiency was first reported back in 03/2016 during an admission for intractable severe hypoglycemia. Endocrinology was consulted at that time and believed her AI was secondary to chronic opiates. Her 0700  cortisol that admission was 0.6. She was started on hydrocortisone 20mg in AM and 10mg in PM.  She then saw Dr. Maher in clinic who noted that a week prior to this admission, she was a kenalog injection, and that patient was not on opioids at home, only at the hospital. Also of note, after this hospitalization, she was discharged without insulin. She has been on hydrocortisone 10mg BID since mid-2016.    ROS:  10 point negative unless mentioned in HPI    Past Medical/Surgical History:  Past Medical History:   Diagnosis Date     Chronic abdominal pain      Chronic pancreatitis (H)     S/P pancreatectomy     Depression with anxiety      Diabetes mellitus (H) 1/2012     Gastro-oesophageal reflux disease      Hypothyroidism 4/23/2015     Kidney stones      Low serum cortisol level (H)      Migraines      Other chronic pain     STOMACH     Other chronic pain     LUMBAR SPINE     Spasm of sphincter of Oddi      Past Surgical History:   Procedure Laterality Date     ARTHROPLASTY CARPOMETACARPAL (THUMB JOINT)  5/2/2014    Procedure: ARTHROPLASTY CARPOMETACARPAL (THUMB JOINT);  Surgeon: Carina Panda MD;  Location: [facility]     CHOLECYSTECTOMY  2004     COLONOSCOPY  7/18/2014    Procedure: COLONOSCOPY;  Surgeon: Aurora Sahu MD;  Location:  GI     COLONOSCOPY N/A 8/1/2017    Procedure: COLONOSCOPY;  Colonoscopy and upper endoscopy;  Surgeon: Deirdre Harris MD;  [facility]     ENDOSCOPIC RETROGRADE CHOLANGIOPANCREATOGRAM       ENDOSCOPIC RETROGRADE CHOLANGIOPANCREATOGRAM  4/19/2011    Procedure:ENDOSCOPIC RETROGRADE CHOLANGIOPANCREATOGRAM; Pancreatic Stent Placement       ENDOSCOPIC RETROGRADE CHOLANGIOPANCREATOGRAM  5/26/2011    Procedure:ENDOSCOPIC RETROGRADE CHOLANGIOPANCREATOGRAM; with Pancreatic Stent Removal; Surgeon:DALE MIMS; Location:[facility]     ENDOSCOPY UPPER, COLONOSCOPY, COMBINED  4/25/2012    Procedure:COMBINED ENDOSCOPY UPPER, COLONOSCOPY; Enteroscopy with  Bile Duct Stent Removal, Colonoscopy  *[facility] Safe Room*; Surgeon:GRACY GODWIN; Location[facility]     ESOPHAGOSCOPY, GASTROSCOPY, DUODENOSCOPY (EGD), COMBINED  5/26/2011    Procedure:COMBINED ESOPHAGOSCOPY, GASTROSCOPY, DUODENOSCOPY (EGD); Surgeon:DALE MIMS; [facility]     ESOPHAGOSCOPY, GASTROSCOPY, DUODENOSCOPY (EGD), COMBINED N/A 10/30/2014    Procedure: COMBINED ESOPHAGOSCOPY, GASTROSCOPY, DUODENOSCOPY (EGD), BIOPSY SINGLE OR MULTIPLE;  Surgeon: Sarai Moon MD;  [facility]: [facility]     ESOPHAGOSCOPY, GASTROSCOPY, DUODENOSCOPY (EGD), COMBINED Left 7/6/2015    Procedure: COMBINED ESOPHAGOSCOPY, GASTROSCOPY, DUODENOSCOPY (EGD), BIOPSY SINGLE OR MULTIPLE;  Surgeon: Thomas Estrada MD;  [facility]     ESOPHAGOSCOPY, GASTROSCOPY, DUODENOSCOPY (EGD), COMBINED N/A 7/8/2016    Procedure: COMBINED ESOPHAGOSCOPY, GASTROSCOPY, DUODENOSCOPY (EGD), BIOPSY SINGLE OR MULTIPLE;  Surgeon: Eloy Klein MD;  [facility]     ESOPHAGOSCOPY, GASTROSCOPY, DUODENOSCOPY (EGD), COMBINED N/A 8/4/2016    Procedure: COMBINED ESOPHAGOSCOPY, GASTROSCOPY, DUODENOSCOPY (EGD), BIOPSY SINGLE OR MULTIPLE;  Surgeon: Jason Brown MD;  [facility]     ESOPHAGOSCOPY, GASTROSCOPY, DUODENOSCOPY (EGD), COMBINED N/A 8/1/2017    Procedure: COMBINED ESOPHAGOSCOPY, GASTROSCOPY, DUODENOSCOPY (EGD);;  Surgeon: Deirdre Harris MD;  [facility]     GYN SURGERY      Hysterectomy and USO     [facility] ENDOSCOPY W EUS  7/20/2011    Procedure:COMBINED ENDOSCOPIC ULTRASOUND, ESOPHAGOSCOPY, GASTROSCOPY, DUODENOSCOPY (EGD); Surgeon:DARVIN DONOHUE; [facility]     HERNIORRHAPHY VENTRAL N/A 9/15/2016    Procedure: HERNIORRHAPHY VENTRAL;  Surgeon: Juanita Bernabe MD;  [facility]     HYSTERECTOMY  1997 or 1998    USO     INCISION AND DRAINAGE ABDOMEN WASHOUT, COMBINED  8/16/2012    Procedure: COMBINED INCISION AND DRAINAGE ABDOMEN WASHOUT;  ,debridement and Drainage  Post Appendectomy;  Surgeon: Ron Austin MD;  [facility]     INJECT TRANSVERSUS ABDOMINIS PLANE (TAP) BLOCK BILATERAL Bilateral 5/26/2016    Procedure: INJECT TRANSVERSUS ABDOMINIS PLANE (TAP) BLOCK BILATERAL;  Surgeon: Leonard Mccallum MD;  [facility]     LAPAROSCOPIC APPENDECTOMY  7/30/2012    Procedure: LAPAROSCOPIC APPENDECTOMY;  Open Appendectomy;  Surgeon: Ron Austin MD;  [facility]     PANCREATECTOMY, TRANSPLANT AUTO ISLET CELL, COMBINED  1/6/2012    Procedure:COMBINED PANCREATECTOMY, TRANSPLANT AUTO ISLET CELL; Total  Pancreatectomy, Auto Islet Transplant, splenectomy, 18fr. transgastric-jejunal feeding tube placement, liver biopsy; Surgeon:PALAK LEE; [facility]     REPLACE GASTROSTOMY TUBE, PERCUTANEOUS N/A 8/30/2017    Procedure: REPLACE GASTROSTOMY TUBE, PERCUTANEOUS;  GJ Tube Change;  Surgeon: Jose Nath PA-C;  [facility]     SPLENECTOMY         Allergies:  Allergies   Allergen Reactions     Corticosteroids Other (See Comments)     All oral,IV and injectable steroids are contraindicated (unless in life threatening situations) in Islet Auto transplant recipients. They can cause irreversible loss of islet cell function. Please contact patients transplant care coordinator Malini Julien RN BSN @[phone]/Pager [phone]  and Endocrinologist prior to administration.     Chocolate Flavor Rash     Breaks out when eats chocolate       PTA Meds:  Prior to Admission medications    Medication Sig Last Dose Taking? Auth Provider   acetaminophen 500 MG CAPS Take 1,000 mg by mouth three times a day as needed. 8/26/2018 at Unknown time Yes Reported, Patient   alendronate (FOSAMAX) 70 MG tablet Take 1 tablet (70 mg) by mouth every 7 days On Sundays take first thing in the morning with plain water and remain upright for at least 30 minutes and until after first food of the day    Do not restart Fosamax (alendronate) until your difficulty  swallowing has resolved and you have finished the entire course of fluconazole (Diflucan). 8/26/2018 Yes Kendall Owen MD   alum & mag hydroxide-simethicone (MYLANTA/MAALOX) 200-200-25 MG CHEW chewable tablet Take 1 tablet by mouth 3 times daily as needed for indigestion 8/26/2018 at Unknown time Yes Unknown, Entered By History   amylase-lipase-protease (CREON 12) 45730 units CPEP Take 6 to 8 capsules by mouth with meals and take 4 capsules with snacks 8/26/2018 at Unknown time Yes Unknown, Entered By History   aspirin 81 MG tablet Take 81 mg by mouth daily. 8/26/2018 at Unknown time Yes Reported, Patient   cyclobenzaprine (FLEXERIL) 5 MG tablet Take 10 mg by mouth 2 times daily as needed for muscle spasms 8/26/2018 at Unknown time Yes Unknown, Entered By History   diclofenac (VOLTAREN) 1 % GEL 2 g Apply 2 g to skin four times a day as needed (to affected upper extremity joint(s)). Maximum 8g/day per joint, 16g/day total. Past Week at Unknown time Yes Reported, Patient   dicyclomine (BENTYL) 10 MG capsule TAKE ONE CAPSULE BY MOUTH EVERY 6 HOURS AS NEEDED 8/26/2018 at Unknown time Yes Claudia Sosa MD   dronabinol (MARINOL) 2.5 MG capsule Take 2 capsules (5 mg) by mouth 2 times daily as needed 8/26/2018 at Unknown time Yes Ron Morgan MD   DULoxetine (CYMBALTA) 30 MG EC capsule Take 1 capsule (30 mg) by mouth daily With 60mg capsule for total dose of 90mg 8/26/2018 at Unknown time Yes Ron Morgan MD   DULoxetine (CYMBALTA) 60 MG EC capsule Take 1 capsule (60 mg) by mouth daily With 30mg capsule for total dose of 90mg 8/26/2018 at Unknown time Yes Ron Morgan MD   fluconazole (DIFLUCAN) 200 MG tablet Take 2 tabs the first day and 1 tab for the next 13 days 8/26/2018 at Unknown time Yes Carina Ramírez MD   furosemide (LASIX) 20 MG tablet Take 1 tablet (20 mg) by mouth 2 times daily 8/26/2018 at Unknown time Yes Ron Morgan MD   hydrocortisone (CORTEF)  10 MG tablet Take 10 mg in the morning and 10 mg at bedtime. Watch for hypoglycemia recurrence. 8/26/2018 at Unknown time Yes Amena Maher MD   insulin aspart (NOVOLOG VIAL) 100 UNITS/ML VIAL As directed in pump 8/26/2018 at Unknown time Yes Ron Morgan MD   levothyroxine (SYNTHROID/LEVOTHROID) 112 MCG tablet Take 1 tablet (112 mcg) by mouth daily 8/26/2018 at Unknown time Yes Ron Morgan MD   linaclotide (LINZESS) 145 MCG capsule Take 1 capsule (145 mcg) by mouth every morning (before breakfast) 8/26/2018 at Unknown time Yes Ron Morgan MD   metoclopramide (REGLAN) 5 MG tablet Take 5 mg by mouth 2 times daily as needed 8/26/2018 at Unknown time Yes Unknown, Entered By History   nystatin (MYCOSTATIN) 86443 unit/0.5mL SUSP Take 1 mL (100,000 Units) by mouth 4 times daily 8/26/2018 at Unknown time Yes Gabriel Scruggs MD   ondansetron (ZOFRAN-ODT) 4 MG ODT tab DISSOLVE ONE TABLET ON THE TONGUE EVERY 6 HOURS AS NEEDED FOR NAUSEA AND VOMITING 8/26/2018 at Unknown time Yes Ron Morgan MD   pantoprazole (PROTONIX) 40 MG enteric coated tablet Take 1 tablet (40 mg) by mouth 2 times daily 8/26/2018 at Unknown time Yes Ron Morgan MD   polyethylene glycol (MIRALAX/GLYCOLAX) packet Take 1 packet by mouth 2 times daily as needed for constipation  8/24/2018 Yes Rosalee Dee MD   potassium chloride SA (K-DUR/KLOR-CON M) 20 MEQ CR tablet Take 1 tablet (20 mEq) by mouth daily 8/26/2018 at Unknown time Yes Ron Morgan MD   pregabalin (LYRICA) 150 MG capsule Take 1 capsule (150 mg) by mouth 3 times daily 8/26/2018 at Unknown time Yes Ron Morgan MD   senna-docusate (SENOKOT-S;PERICOLACE) 8.6-50 MG per tablet Take 2 tablets by mouth daily 8/26/2018 at Unknown time Yes Unknown, Entered By History   SUMAtriptan (IMITREX) 50 MG tablet Take 1 tablet (50 mg) by mouth at onset of headache for migraine Take 1 Tab by  mouth Once as needed for Migraine Headache. May repeat after two hours.  Maximum dose 200 mg/24 hours. 8/25/2018 Yes Ron Morgan MD   topiramate (TOPAMAX) 100 MG tablet Take 1 tablet (100 mg) by mouth 2 times daily 8/25/2018 Yes Ron Morgan MD   traZODone (DESYREL) 100 MG tablet TAKE 1-2 TABLETS BY MOUTH AN HOUR BEFORE BEDTIME Past Week at Unknown time Yes Ron Morgan MD   ACCU-CHEK MULTICLIX LANCETS MISC by Lancet route. Use to test blood sugar daily or as directed.   Mike Ruiz MD   blood glucose monitoring (NOEMI CONTOUR NEXT) test strip Use to test blood sugar 8 times daily.   Amena Maher MD   blood glucose monitoring (NO BRAND SPECIFIED) test strip Use to test blood glucoses 6-8 times per day.   Amena Maher MD   glucagon (GLUCAGON EMERGENCY) 1 MG kit Inject 1 mg into the muscle once for 1 dose   Amena Maher MD   insulin pen needle needle RX# [id number]   Pen Needle [facility] 31G UF IV Mini   Use 4-8 needles per day for insulin injections.       Amena Maher MD   Nutritional Supplements (BOOST HIGH PROTEIN) LIQD Also can use Ensure clear (available over the counter)   Vicenta Hernandez MD        Current Medications:   Current Facility-Administered Medications   Medication     acetaminophen (TYLENOL) tablet 1,000 mg     alum & mag hydroxide-simethicone (MYLANTA/MAALOX) 200-200-25 MG chewable tablet 1 tablet     amylase-lipase-protease (CREON 12) 99080 units per capsule 72,000 Units     aspirin chewable tablet 81 mg     dextrose 10% and 0.45% sodium chloride infusion (CMPD)     dextrose 10% infusion     glucose gel 15-30 g    Or     dextrose 50 % injection 25-50 mL    Or     glucagon injection 1 mg     diclofenac (VOLTAREN) 1 % topical gel 2 g     dronabinol (MARINOL) capsule 5 mg     DULoxetine (CYMBALTA) EC capsule 30 mg     DULoxetine (CYMBALTA) EC capsule 60 mg     [START ON 8/28/2018] hydrocortisone (CORTEF) tablet 10 mg     hydrocortisone  (CORTEF) tablet 20 mg     levothyroxine (SYNTHROID/LEVOTHROID) tablet 112 mcg     lidocaine (LMX4) cream     lidocaine 1 % 1 mL     linaclotide (LINZESS) capsule 145 mcg     magnesium sulfate 2 g in NS intermittent infusion ([facility] or [facility] Cmpd)     magnesium sulfate 4 g in 100 mL sterile water (premade)     melatonin tablet 1 mg     naloxone (NARCAN) injection 0.1-0.4 mg     nystatin (MYCOSTATIN) 525702 unit/mL suspension 100,000 Units     ondansetron (ZOFRAN-ODT) ODT tab 4 mg    Or     ondansetron (ZOFRAN) injection 4 mg     oxyCODONE IR (ROXICODONE) tablet 5 mg     pantoprazole (PROTONIX) EC tablet 40 mg     polyethylene glycol (MIRALAX/GLYCOLAX) Packet 17 g     potassium chloride (KLOR-CON) Packet 20-40 mEq     potassium chloride (KLOR-CON) Packet 60 mEq     potassium chloride 10 mEq in 100 mL sterile water intermittent infusion (premix)     potassium chloride 20 mEq in 50 mL intermittent infusion     potassium chloride SA (K-DUR/KLOR-CON M) CR tablet 20-40 mEq     potassium phosphate 15 mmol in D5W 250 mL intermittent infusion     potassium phosphate 20 mmol in D5W 250 mL intermittent infusion     potassium phosphate 20 mmol in D5W 500 mL intermittent infusion     potassium phosphate 25 mmol in D5W 500 mL intermittent infusion     pregabalin (LYRICA) capsule 150 mg     sennosides (SENOKOT) tablet 1-2 tablet     sodium chloride (PF) 0.9% PF flush 3 mL     sodium chloride (PF) 0.9% PF flush 3 mL     topiramate (TOPAMAX) tablet 100 mg       Family History:  Family History   Problem Relation Age of Onset     Hypertension Mother      Diabetes Mother      Osteoperosis Mother      Cancer Father      pancreatic cancer     Diabetes Maternal Grandmother      Cardiovascular Maternal Grandmother      Cancer Maternal Grandfather      lung cancer     Cancer Sister      brain     Cancer Sister      liver cancer       Social History:  Social History   Substance Use Topics     Smoking status: Never Smoker     Smokeless  "tobacco: Never Used     Alcohol use No         Exam:  Blood pressure 121/69, pulse 87, temperature 98.7  F (37.1  C), temperature source Oral, resp. rate 20, height 1.575 m (5' 2\"), weight 61.2 kg (135 lb), SpO2 98 %, not currently breastfeeding.  Gen: laying in bed, NAD, pleasant and conversant  HEENT: anicteric, EOMI, no visual field defects  Cardio: RRR, +s1/s2  Resp: CTAB  Abd: soft, infusion set present over right abdomen, TTP diffusely but mostly in epigastric and LUQ. Prior GJ site is closed and looks c/d/i.  No guarding  Ext: WWP, no swelling or edema  Neuro: A&Ox3, moving extremities spontaneously    Labs/Imaging:  Reviewed in EPIC        Endocrine Staff Note    The patient was seen and examined by me with Dr. Hernandez. His note, which I have reviewed and edited, details our mutual findings and plan.    51 yo woman with TPAIT, post-procedure diabetes, hypoglycemia unawareness, and history of Venessa-en-Y gastric bypass. Hypoglycemia prior to admission was due to exogenous insulin with multiple contributing factors (weight loss and decreased insulin need, activity, poor oral intake, alcohol). Blood sugars have remained in the normal range while inpatient on a D10 infusion, suggesting functioning islet cells. Of note, history of gastric bypass could put her at risk of post-bariatric surgery hyperinsulinemic hypoglycemia, though the pattern of hypoglycemia episodes seems less consistent with this diagnosis. Agree with plan to stop the D10 infusion (blood sugars >65 are not low and should be tolerated) and encourage oral intake.     Marlena Parikh MD  Endocrinology and Diabetes   [phone]    Date of Service (when I saw the patient): 8/27/2018      "

## 2018-08-27 NOTE — ED TRIAGE NOTES
Pt. BIBA from home with hypoglycemia. Family reports LKWT @ 2015 and @ 2100 pt. Unresponsive. EMS did not know initial BG but family gave 2 shots of glucagon. EMS gave 1 amp of D50, , 20 mins later dropped to 125, 20 mins later dropped to 93. Upon arrival , AVSS on RA. Pt. Responsive to voice and light stimulation. Pt. Confused and tearful but reoriented easily. EMS placed 20 g IV.

## 2018-08-27 NOTE — PROVIDER NOTIFICATION
Dr. Severino notified that patient's blood glucose was 41, patient received an amp of D50 and her blood glucose is already down to 81 within the hour.  Also notified that patient did not have AM labs drawn and potassium was 2.5 yesterday.  Will increase IVF to 125mL/hr and order labs.

## 2018-08-28 LAB
ANION GAP SERPL CALCULATED.3IONS-SCNC: 7 MMOL/L (ref 3–14)
BUN SERPL-MCNC: 2 MG/DL (ref 7–30)
C PEPTIDE SERPL-MCNC: 0.3 NG/ML (ref 0.9–6.9)
CALCIUM SERPL-MCNC: 8.4 MG/DL (ref 8.5–10.1)
CHLORIDE SERPL-SCNC: 110 MMOL/L (ref 94–109)
CO2 SERPL-SCNC: 25 MMOL/L (ref 20–32)
CREAT SERPL-MCNC: 0.71 MG/DL (ref 0.52–1.04)
ERYTHROCYTE [DISTWIDTH] IN BLOOD BY AUTOMATED COUNT: 13.9 % (ref 10–15)
GFR SERPL CREATININE-BSD FRML MDRD: 86 ML/MIN/1.7M2
GLUCOSE BLDC GLUCOMTR-MCNC: 100 MG/DL (ref 70–99)
GLUCOSE BLDC GLUCOMTR-MCNC: 102 MG/DL (ref 70–99)
GLUCOSE BLDC GLUCOMTR-MCNC: 103 MG/DL (ref 70–99)
GLUCOSE BLDC GLUCOMTR-MCNC: 119 MG/DL (ref 70–99)
GLUCOSE BLDC GLUCOMTR-MCNC: 120 MG/DL (ref 70–99)
GLUCOSE BLDC GLUCOMTR-MCNC: 123 MG/DL (ref 70–99)
GLUCOSE BLDC GLUCOMTR-MCNC: 124 MG/DL (ref 70–99)
GLUCOSE BLDC GLUCOMTR-MCNC: 126 MG/DL (ref 70–99)
GLUCOSE BLDC GLUCOMTR-MCNC: 134 MG/DL (ref 70–99)
GLUCOSE BLDC GLUCOMTR-MCNC: 134 MG/DL (ref 70–99)
GLUCOSE BLDC GLUCOMTR-MCNC: 138 MG/DL (ref 70–99)
GLUCOSE BLDC GLUCOMTR-MCNC: 151 MG/DL (ref 70–99)
GLUCOSE BLDC GLUCOMTR-MCNC: 217 MG/DL (ref 70–99)
GLUCOSE BLDC GLUCOMTR-MCNC: 56 MG/DL (ref 70–99)
GLUCOSE BLDC GLUCOMTR-MCNC: 64 MG/DL (ref 70–99)
GLUCOSE BLDC GLUCOMTR-MCNC: 71 MG/DL (ref 70–99)
GLUCOSE BLDC GLUCOMTR-MCNC: 83 MG/DL (ref 70–99)
GLUCOSE BLDC GLUCOMTR-MCNC: 92 MG/DL (ref 70–99)
GLUCOSE BLDC GLUCOMTR-MCNC: 93 MG/DL (ref 70–99)
GLUCOSE SERPL-MCNC: 109 MG/DL (ref 70–99)
HCT VFR BLD AUTO: 35.6 % (ref 35–47)
HGB BLD-MCNC: 11.6 G/DL (ref 11.7–15.7)
MAGNESIUM SERPL-MCNC: 2.1 MG/DL (ref 1.6–2.3)
MCH RBC QN AUTO: 30.1 PG (ref 26.5–33)
MCHC RBC AUTO-ENTMCNC: 32.6 G/DL (ref 31.5–36.5)
MCV RBC AUTO: 92 FL (ref 78–100)
PHOSPHATE SERPL-MCNC: 2.6 MG/DL (ref 2.5–4.5)
PLATELET # BLD AUTO: 302 10E9/L (ref 150–450)
POTASSIUM SERPL-SCNC: 3.7 MMOL/L (ref 3.4–5.3)
RBC # BLD AUTO: 3.86 10E12/L (ref 3.8–5.2)
SODIUM SERPL-SCNC: 142 MMOL/L (ref 133–144)
T4 FREE SERPL-MCNC: 0.77 NG/DL (ref 0.76–1.46)
TSH SERPL DL<=0.005 MIU/L-ACNC: 2.21 MU/L (ref 0.4–4)
WBC # BLD AUTO: 11.6 10E9/L (ref 4–11)

## 2018-08-28 PROCEDURE — 25000125 ZZHC RX 250: Performed by: STUDENT IN AN ORGANIZED HEALTH CARE EDUCATION/TRAINING PROGRAM

## 2018-08-28 PROCEDURE — 25800025 ZZH RX 258: Performed by: INTERNAL MEDICINE

## 2018-08-28 PROCEDURE — 00000146 ZZHCL STATISTIC GLUCOSE BY METER IP

## 2018-08-28 PROCEDURE — A9270 NON-COVERED ITEM OR SERVICE: HCPCS | Mod: GY | Performed by: STUDENT IN AN ORGANIZED HEALTH CARE EDUCATION/TRAINING PROGRAM

## 2018-08-28 PROCEDURE — A9270 NON-COVERED ITEM OR SERVICE: HCPCS | Mod: GY | Performed by: PATHOLOGY

## 2018-08-28 PROCEDURE — 36415 COLL VENOUS BLD VENIPUNCTURE: CPT | Performed by: INTERNAL MEDICINE

## 2018-08-28 PROCEDURE — 84443 ASSAY THYROID STIM HORMONE: CPT | Performed by: INTERNAL MEDICINE

## 2018-08-28 PROCEDURE — 83735 ASSAY OF MAGNESIUM: CPT | Performed by: INTERNAL MEDICINE

## 2018-08-28 PROCEDURE — 85027 COMPLETE CBC AUTOMATED: CPT | Performed by: INTERNAL MEDICINE

## 2018-08-28 PROCEDURE — 25800025 ZZH RX 258: Performed by: STUDENT IN AN ORGANIZED HEALTH CARE EDUCATION/TRAINING PROGRAM

## 2018-08-28 PROCEDURE — 12000005 ZZH R&B IMCU CRITICAL UMMC

## 2018-08-28 PROCEDURE — 80048 BASIC METABOLIC PNL TOTAL CA: CPT | Performed by: INTERNAL MEDICINE

## 2018-08-28 PROCEDURE — 99233 SBSQ HOSP IP/OBS HIGH 50: CPT | Mod: GC | Performed by: PEDIATRICS

## 2018-08-28 PROCEDURE — 25000132 ZZH RX MED GY IP 250 OP 250 PS 637: Mod: GY | Performed by: INTERNAL MEDICINE

## 2018-08-28 PROCEDURE — 25000125 ZZHC RX 250: Performed by: INTERNAL MEDICINE

## 2018-08-28 PROCEDURE — 84681 ASSAY OF C-PEPTIDE: CPT | Performed by: INTERNAL MEDICINE

## 2018-08-28 PROCEDURE — 84439 ASSAY OF FREE THYROXINE: CPT | Performed by: INTERNAL MEDICINE

## 2018-08-28 PROCEDURE — A9270 NON-COVERED ITEM OR SERVICE: HCPCS | Mod: GY | Performed by: INTERNAL MEDICINE

## 2018-08-28 PROCEDURE — 84100 ASSAY OF PHOSPHORUS: CPT | Performed by: INTERNAL MEDICINE

## 2018-08-28 PROCEDURE — 25000132 ZZH RX MED GY IP 250 OP 250 PS 637: Mod: GY | Performed by: STUDENT IN AN ORGANIZED HEALTH CARE EDUCATION/TRAINING PROGRAM

## 2018-08-28 PROCEDURE — 25000128 H RX IP 250 OP 636: Performed by: STUDENT IN AN ORGANIZED HEALTH CARE EDUCATION/TRAINING PROGRAM

## 2018-08-28 PROCEDURE — 25000132 ZZH RX MED GY IP 250 OP 250 PS 637: Mod: GY | Performed by: PATHOLOGY

## 2018-08-28 RX ORDER — HYDROCORTISONE 20 MG/1
20 TABLET ORAL 2 TIMES DAILY
Status: DISCONTINUED | OUTPATIENT
Start: 2018-08-28 | End: 2018-08-29

## 2018-08-28 RX ORDER — DICYCLOMINE HYDROCHLORIDE 10 MG/1
10 CAPSULE ORAL 4 TIMES DAILY PRN
Status: DISCONTINUED | OUTPATIENT
Start: 2018-08-28 | End: 2018-08-29 | Stop reason: HOSPADM

## 2018-08-28 RX ORDER — HYDROCORTISONE 10 MG/1
10 TABLET ORAL ONCE
Status: COMPLETED | OUTPATIENT
Start: 2018-08-28 | End: 2018-08-28

## 2018-08-28 RX ADMIN — OXYCODONE HYDROCHLORIDE 5 MG: 5 TABLET ORAL at 17:06

## 2018-08-28 RX ADMIN — NYSTATIN 100000 UNITS: 100000 SUSPENSION ORAL at 11:43

## 2018-08-28 RX ADMIN — DEXTROSE MONOHYDRATE 50 ML: 500 INJECTION PARENTERAL at 09:45

## 2018-08-28 RX ADMIN — DULOXETINE HYDROCHLORIDE 30 MG: 30 CAPSULE, DELAYED RELEASE ORAL at 08:28

## 2018-08-28 RX ADMIN — HYDROCORTISONE 10 MG: 10 TABLET ORAL at 08:25

## 2018-08-28 RX ADMIN — HYDROCORTISONE 10 MG: 10 TABLET ORAL at 11:43

## 2018-08-28 RX ADMIN — HYDROCORTISONE 20 MG: 20 TABLET ORAL at 20:05

## 2018-08-28 RX ADMIN — NYSTATIN 100000 UNITS: 100000 SUSPENSION ORAL at 20:05

## 2018-08-28 RX ADMIN — DEXTROSE MONOHYDRATE 50 ML: 500 INJECTION PARENTERAL at 11:37

## 2018-08-28 RX ADMIN — LEVOTHYROXINE SODIUM 112 MCG: 112 TABLET ORAL at 08:27

## 2018-08-28 RX ADMIN — NYSTATIN 100000 UNITS: 100000 SUSPENSION ORAL at 08:25

## 2018-08-28 RX ADMIN — SODIUM CHLORIDE 125 ML/HR: 234 INJECTION INTRAMUSCULAR; INTRAVENOUS; SUBCUTANEOUS at 11:45

## 2018-08-28 RX ADMIN — PANTOPRAZOLE SODIUM 40 MG: 40 TABLET, DELAYED RELEASE ORAL at 20:05

## 2018-08-28 RX ADMIN — ASPIRIN 81 MG CHEWABLE TABLET 81 MG: 81 TABLET CHEWABLE at 08:25

## 2018-08-28 RX ADMIN — NYSTATIN 100000 UNITS: 100000 SUSPENSION ORAL at 16:40

## 2018-08-28 RX ADMIN — ONDANSETRON 4 MG: 4 TABLET, ORALLY DISINTEGRATING ORAL at 06:43

## 2018-08-28 RX ADMIN — ONDANSETRON 4 MG: 2 INJECTION INTRAMUSCULAR; INTRAVENOUS at 16:40

## 2018-08-28 RX ADMIN — DICYCLOMINE HYDROCHLORIDE 10 MG: 10 CAPSULE ORAL at 17:06

## 2018-08-28 RX ADMIN — Medication 2.5 MG: at 21:24

## 2018-08-28 RX ADMIN — PANTOPRAZOLE SODIUM 40 MG: 40 TABLET, DELAYED RELEASE ORAL at 08:26

## 2018-08-28 RX ADMIN — DRONABINOL 5 MG: 2.5 CAPSULE ORAL at 08:50

## 2018-08-28 RX ADMIN — ACETAMINOPHEN 1000 MG: 500 TABLET, FILM COATED ORAL at 17:06

## 2018-08-28 RX ADMIN — SODIUM CHLORIDE 175 ML/HR: 234 INJECTION INTRAMUSCULAR; INTRAVENOUS; SUBCUTANEOUS at 04:08

## 2018-08-28 RX ADMIN — OXYCODONE HYDROCHLORIDE 5 MG: 5 TABLET ORAL at 06:43

## 2018-08-28 RX ADMIN — TOPIRAMATE 100 MG: 50 TABLET ORAL at 08:27

## 2018-08-28 RX ADMIN — DICLOFENAC SODIUM 2 G: 10 GEL TOPICAL at 20:05

## 2018-08-28 RX ADMIN — PREGABALIN 150 MG: 75 CAPSULE ORAL at 08:50

## 2018-08-28 RX ADMIN — SODIUM CHLORIDE 75 ML/HR: 234 INJECTION INTRAMUSCULAR; INTRAVENOUS; SUBCUTANEOUS at 20:27

## 2018-08-28 RX ADMIN — DULOXETINE HYDROCHLORIDE 60 MG: 60 CAPSULE, DELAYED RELEASE ORAL at 08:26

## 2018-08-28 RX ADMIN — PREGABALIN 150 MG: 75 CAPSULE ORAL at 14:18

## 2018-08-28 RX ADMIN — PANCRELIPASE 72000 UNITS: 60000; 12000; 38000 CAPSULE, DELAYED RELEASE PELLETS ORAL at 17:06

## 2018-08-28 RX ADMIN — LINACLOTIDE 145 MCG: 145 CAPSULE, GELATIN COATED ORAL at 08:25

## 2018-08-28 RX ADMIN — ACETAMINOPHEN 1000 MG: 500 TABLET, FILM COATED ORAL at 08:24

## 2018-08-28 RX ADMIN — TOPIRAMATE 100 MG: 50 TABLET ORAL at 20:05

## 2018-08-28 RX ADMIN — PREGABALIN 150 MG: 75 CAPSULE ORAL at 20:05

## 2018-08-28 ASSESSMENT — ACTIVITIES OF DAILY LIVING (ADL)
ADLS_ACUITY_SCORE: 12
ADLS_ACUITY_SCORE: 10

## 2018-08-28 ASSESSMENT — PAIN DESCRIPTION - DESCRIPTORS
DESCRIPTORS: ACHING
DESCRIPTORS: ACHING;CRAMPING
DESCRIPTORS: SHARP
DESCRIPTORS: ACHING;CRAMPING
DESCRIPTORS: HEADACHE

## 2018-08-28 NOTE — PROVIDER NOTIFICATION
Notified Dr. Severino of pt's blood glucose of 56 @0943. Gave 1 amp D50; D10% in 0.45% NS continues infusing @125mL/hr. Will continue to monitor.

## 2018-08-28 NOTE — PROGRESS NOTES
Endocrine Progress Note  Patient: Chantell Kidd   MRN: [id number]  Date of Service: 08/28/2018    Assessment/Plan:  Chantell Kidd is a 52 year old female with PMHx of chronic pancreatitis s/p TPIAT, nomi-en-Y, and splenectomy in 2012, IDDM on insulin pump, recurrent severe hypoglycemia, hypothyroidism, BRENDAN, prior GJ, and possible adrenal insufficiency who presented on 8/26/18 with severe hypoglycemia.     # Severe, recurrent hypoglycemia  Patient has history of these severe hypoglycemic episodes. This current episode led to unresponsiveness and needing help from family and EMS. She had a confirmed low serum glucose of 37 during admission. She has hypoglycemic unawareness as well. During previous admissions for this, her C-peptide has either been low or undetectable, with insulin levels being high - this picture fits excess exogenous insulin.  The hypoglycemic episode prior to this admission was likely multifactorial in the setting of alcohol use, weight loss, poor appetite with low glycogen stores, and being more active the day of admission.  Given her weight loss, her insulin needs have likely declined. She may have some beta-cell function from her islet cell transplant. In the setting of continued D10 infusion, this would lead to continued insulin secretion from these cells, which may prevent her blood sugars from rising as you would expect and could potentially cause some reactive lows.   - Given continued low blood sugars today, will again increase hydrocortisone to 20mg BID, as this dose did seem to improve blood sugars somewhat  - Encouraged PO intake  - If patient eats breakfast, we would recommend shutting off D10 infusion and seeing how she does  - Can space out accuchecks to q2h   - Recommend treating low blood sugars only if they are less than 55 or if she has neuroglycopenic symptoms     # IDDM on home insulin pump  Patient with hx of TPIAT with reported beta-cell function, but she is reliant on daily  insulin. She has detectable c-peptide levels, most recently 02/2018. Follows with Dr. Maher in clinic. Hypoglycemia as above. Patient's c-peptide of 0.3 this morning with a BG of 109 is interesting. This suggests that her beta-cells are not that active, and in context of continued hypoglycemia, could indicate exogenous insulin.      # Possible secondary adrenal insufficiency  Patient originally diagnosed in 03/2016 with possible opioid-related secondary AI, however this diagnosis is questioned by her primary endocrinologist. She has remained on 10mg hydrocortisone BID since 2016    Patient seen and discussed with Dr. Kati Ribera MD  PGY4, Endocrinology Fellow  Pager: [phone]    Subjective:  Patient threw up her meal earlier today. She is going to try to eat dinner. At times has felt dizzy or lightheaded. Lowest value today has been 56 - she reports no symptoms with this.    Medications:  Current Facility-Administered Medications   Medication     acetaminophen (TYLENOL) tablet 1,000 mg     alum & mag hydroxide-simethicone (MYLANTA/MAALOX) 200-200-25 MG chewable tablet 1 tablet     amylase-lipase-protease (CREON 12) 89028 units per capsule 72,000 Units     aspirin chewable tablet 81 mg     dextrose 10% and 0.45% sodium chloride infusion (CMPD)     dextrose 10% infusion     glucose gel 15-30 g    Or     dextrose 50 % injection 25-50 mL    Or     glucagon injection 1 mg     diclofenac (VOLTAREN) 1 % topical gel 2 g     dicyclomine (BENTYL) capsule 10 mg     dronabinol (MARINOL) capsule 5 mg     DULoxetine (CYMBALTA) EC capsule 30 mg     DULoxetine (CYMBALTA) EC capsule 60 mg     hydrocortisone (CORTEF) tablet 10 mg     levothyroxine (SYNTHROID/LEVOTHROID) tablet 112 mcg     lidocaine (LMX4) cream     lidocaine 1 % 1 mL     linaclotide (LINZESS) capsule 145 mcg     magnesium sulfate 2 g in NS intermittent infusion ([facility] or [facility] Cmpd)     magnesium sulfate 4 g in 100 mL sterile water (premade)     melatonin  "tablet 1 mg     naloxone (NARCAN) injection 0.1-0.4 mg     nystatin (MYCOSTATIN) 797023 unit/mL suspension 100,000 Units     ondansetron (ZOFRAN-ODT) ODT tab 4 mg    Or     ondansetron (ZOFRAN) injection 4 mg     oxyCODONE IR (ROXICODONE) tablet 5 mg     pantoprazole (PROTONIX) EC tablet 40 mg     polyethylene glycol (MIRALAX/GLYCOLAX) Packet 17 g     potassium chloride (KLOR-CON) Packet 20-40 mEq     potassium chloride (KLOR-CON) Packet 60 mEq     potassium chloride 10 mEq in 100 mL sterile water intermittent infusion (premix)     potassium chloride 20 mEq in 50 mL intermittent infusion     potassium chloride SA (K-DUR/KLOR-CON M) CR tablet 20-40 mEq     potassium phosphate 15 mmol in D5W 250 mL intermittent infusion     potassium phosphate 20 mmol in D5W 250 mL intermittent infusion     potassium phosphate 20 mmol in D5W 500 mL intermittent infusion     potassium phosphate 25 mmol in D5W 500 mL intermittent infusion     pregabalin (LYRICA) capsule 150 mg     sennosides (SENOKOT) tablet 1-2 tablet     sodium chloride (PF) 0.9% PF flush 3 mL     sodium chloride (PF) 0.9% PF flush 3 mL     topiramate (TOPAMAX) tablet 100 mg        Physical Examination:  Vital signs:  Temp: 98.6  F (37  C) Temp src: Oral BP: 132/81   Heart Rate: 71 Resp: 16 SpO2: 97 % O2 Device: None (Room air)   Height: 157.5 cm (5' 2\") Weight: 66.3 kg (146 lb 3.2 oz) (standing scale 1)  Estimated body mass index is 26.74 kg/(m^2) as calculated from the following:    Height as of this encounter: 1.575 m (5' 2\").    Weight as of this encounter: 66.3 kg (146 lb 3.2 oz).    Gen: well appearing, NAD, pleasant and conversant  HEENT: anicteric  Resp: breathing comfortably  Neuro: alert, answers questions appropriately    Labs:   Reviewed in [facility]      Endocrine Staff Note    The patient was seen and examined by me with Dr. Ribera.  His note details our mutual findings and plan.    Agree with stopping D10 infusion so that we can assess blood sugar response. " In a woman with poor oral intake blood glucose values in the 50s can be a normal response; on the other hand the absence of symptoms in this patient may be a result of chronic exposure to hypoglycemia from exogenous or endogenous insulin. Hypoglycemia is a known complication of TPAIT, in particular when the liver is the site of transplantation, and may be because of an impaired glucagon counter-regulatory response to falling blood sugar. Interestingly, blood sugars did not rise today, and in the absense of additional exogenous insulin this suggests that her transplanted islet cells are producing insulin.     We recommend that the D10 infusion be stopped and the patient followed for symptoms and recurrent hypoglycemia. If blood glucose falls <55 or if she develops neuroglycopenia would send a serum glucose, insulin, and C-peptide level. For blood glucose >55 without symptoms, okay to follow without treatment.     Marlena Parikh MD  Endocrinology and Diabetes   [phone]

## 2018-08-28 NOTE — PROVIDER NOTIFICATION
Patient's 0100 blood sugar is 120. Notified maroon cross cover to see if the team wanted to reduce the D10 1/2 NS infusion rate. Orders to continue the infusion at 175 mL/hr at this time and continue to monitor per MD.

## 2018-08-28 NOTE — PROGRESS NOTES
"[facility], [place]    Internal Medicine Progress Note - [facility] Service    Main Plans for Today    - Encourage food intake   - Increased steroids to 20mg BID   - Monitor BGs     Assessment & Plan   Chantell Kidd is a 52 year old female w/ pmhx of chronic pancreatitis s/p islet auto transplantation resultant in T1DM, chronic abd pain, presents with 1 day of abd pain and loss of consciousness per family, found to have hypoglycemia in 40s. Continues to have labile blood sugars, some as low as 20's.      # Hypoglycemia   # Hypokalemia - improved  # S/p panacreatectomy with autoislet cell transplant   # Type 1 DM   Pt has used insulin pump for 5-6 yrs, with occasional hypoglycemic episodes resulting in ED visits. Ate poorly due to feeling ill, but bolused insulin day of admission despite poor intake. Was drinking wine coolers, came in with ETOH level of 0.194. ED BG 49,  K+ 2.9 on admission, baseline normal. Likely 2/2 insulin infusion in setting of poor PO intake. Hypoglycemia and hypokalemia likely both 2/2 insulin overdose. K improved  - Endocrine consulted, appreciate recs      - Increase steroids from 10mg BID to 20mg BID  - No insulin pump seen, taken off by family. Call family to bring in pump, obtain setting  - D10/ 1/2 NS at 75cc/hr --> will discontinue if patient eats breakfast.   - CHO consistent diet  - Lyte replacement protocol, K, Mag, Phos.      # Abdominal Pain  # Leukocytosis  H/o chronic abd pain. CT read concerning for mild gastroenteritis, no discussion of pancreatic involvement. H/o chronic pancreatitis s/p islet auto-transplant. Recent etoh consumption, cannot r/o pancreatitis exacerbation.. Abd pain likely exacerbated by current episode of hypoglycemia. WBC 18 on admission --> 11 after fluids.    - Monitor   - Continue APAP   - Oxycodone 5mg Q8hrs PRN overnight   - Patient told RN she has \"oxycodone, dilaudid, and methadone\" at home for as needed - as prescribed by pain " "doctor. No notes from pain since 2015 and no opiates on admission drug screen.     # Lactic acidosis  3.8 on admission --> 0.9 with fluids. Likely secondary to dehydration and hypoglycemia. No other sign of systemic infection. No antibiotics at this point, will closely monitor.      Chronic Medical Problems    - CIERRA: PTA cymbalta, HOLD trazodone until mental status baseline   - Hypothyroid: PTA levothyroxine   - Adrenal Insufficiency: continue pta hydrocortisone   - Chronic Pain: continue pta tylenol, simethicone, dicyclomine    # Pain Assessment:  Current Pain Score 8/28/2018   Patient currently in pain? yes   Pain score (0-10) -   Pain location Abdomen   Pain descriptors Aching;Cramping   - Pain management was discussed and the plan was created in a collaborative fashion.  Chantell's response to the current recommendations: resistant  - Please see the plan for pain management as documented above    Diet: Consistent Carbohydrate Diet  IVF: D10/ 1/2 NS  DVT Prophylaxis: Pneumatic Compression Devices  Code Status: Full Code    Disposition Plan   Expected discharge: 2 - 3 days, recommended to prior living arrangement once blood sugars stable.     Entered: Sonia Severino 08/28/2018, 6:50 PM   Information in the above section will display in the discharge planner report.      The patient's care was discussed with the Attending Physician, Dr. Hanley.    Sonia Severino MD  [facility]: 5  Pager: [phone]  Please see sticky note for cross cover information    Interval History   Continued to have labile BGs. Required D50 throughout day. Responded well to increase in steroids. Having continued nausea - difficulty with oral intake. Will try dinner. Endorsing abdominal discomfort still. No vomiting/diarrhea. Endorsing BONNER.     Physical Exam   /81 (BP Location: Right arm)  Pulse 87  Temp 98.6  F (37  C) (Oral)  Resp 16  Ht 1.575 m (5' 2\")  Wt 61.8 kg (136 lb 3.2 oz)  SpO2 97%  BMI 24.91 " kg/m2    Gen: NAD, alert, cooperative.  HEENT: EOMI, no scleral icterus, tracking appropriately  Resp: CTAB, no crackles or wheezes, no increased WOB  Cardiac: RRR, no S3/S4, no M/R/G appreciated  GI: soft, normoactive bowel sounds, no tenderness when pushing with stethoscope, less pain with palpation.  Non-distended  Ext: WWP, no edema, spontaneous movement in all 4  Neuro: alert, CN 2-12 grossly intact, appropriate mentation    Data   Medications     dextrose 10% and 0.45% NaCl 125 mL/hr (08/28/18 1200)       amylase-lipase-protease  6 capsule Oral TID w/meals     aspirin  81 mg Oral Daily     dextrose   Intravenous Once     diclofenac  2 g Topical 4x Daily     DULoxetine  30 mg Oral Daily     DULoxetine  60 mg Oral Daily     hydrocortisone  20 mg Oral BID     levothyroxine  112 mcg Oral Daily     linaclotide  145 mcg Oral QAM AC     nystatin  100,000 Units Oral 4x Daily     pantoprazole  40 mg Oral BID     potassium chloride  60 mEq Oral Once     pregabalin  150 mg Oral TID     sodium chloride (PF)  3 mL Intracatheter Q8H     topiramate  100 mg Oral BID     Data     Recent Labs  Lab 08/28/18  0647 08/27/18  2032 08/27/18  1737 08/27/18  1116 08/27/18  0936  08/26/18  2236   WBC 11.6*  --   --   --  11.1*  --  18.6*   HGB 11.6*  --   --   --  11.5*  --  11.8   MCV 92  --   --   --  91  --  90     --   --   --  281  --  318     --   --   --  141  --  140   POTASSIUM 3.7 4.3 4.0  --  3.2*  --  2.5*   CHLORIDE 110*  --   --   --  108  --  107   CO2 25  --   --   --  25  --  22   BUN 2*  --   --   --  6*  --  7   CR 0.71  --   --   --  0.59  --  0.59   ANIONGAP 7  --   --   --  8  --  12   ELIZA 8.4*  --   --   --  8.2*  --  8.3*   *  --   --  37* 175*  < > 69*   ALBUMIN  --   --   --   --  3.2*  --  3.7   PROTTOTAL  --   --   --   --  6.5*  --  7.2   BILITOTAL  --   --   --   --  0.4  --  0.2   ALKPHOS  --   --   --   --  100  --  106   ALT  --   --   --   --  38  --  43   AST  --   --   --   --  42   --  35   LIPASE  --   --   --   --   --   --  36*   < > = values in this interval not displayed.  No results found for this or any previous visit (from the past 24 hour(s)).

## 2018-08-28 NOTE — PHARMACY
RX [place]) reviewed:  reviewed- recommend provider review  Request to review  database by Dr Sonia Severino  [place] profile:  [email or web address]          Matilde Thomson PharmD, BCPS

## 2018-08-28 NOTE — PLAN OF CARE
"Problem: Patient Care Overview  Goal: Plan of Care/Patient Progress Review  Outcome: No Change  /73 (BP Location: Right arm)  Pulse 87  Temp 98.7  F (37.1  C) (Oral)  Resp 20  Ht 1.575 m (5' 2\")  Wt 66.3 kg (146 lb 3.2 oz)  SpO2 97%  BMI 26.74 kg/m2   Neuro: A&Ox4. Follows commands. PERRLA.   Cardiac: SR. HR 80s-90s. SBP 120s. Afebrile.   Respiratory: Sating >92% on RA.  GI/: Adequate urine output. BM x0.  Diet/appetite: Regular diet. Poor appetite. Encouraged oral intake. Pt consumed 1 popsicle.  Activity: Standby assist up to bathroom.   Pain: Abdominal pain. At acceptable level on current regimen. Managed with prn oxy and tylenol.  Skin: No new deficits noted.  LDA's: Rt and lft PIV intact. D10 with .45% NS infusing at 175mL/hr. No titrating down of IV rate overnight per stacy farnsworth MD d/t patient's inability to maintain her blood sugars.     Plan: Continue with POC. Notify primary team with changes.      Problem: Diabetes Comorbidity  Goal: Diabetes  Patient comorbidity will be monitored for signs and symptoms of hyperglycemia or hypoglycemia. Problems will be absent, minimized or managed by discharge/transition of care.   Outcome: No Change  Hourly blood sugars 50s-140s. D10 with .45% NS infusing at 175 mL/hr.  No titrating down of IV rate overnight per stacy farnsworth MD d/t patient's inability to maintain her blood sugars. Will continue to monitor.        "

## 2018-08-29 VITALS
HEART RATE: 87 BPM | DIASTOLIC BLOOD PRESSURE: 81 MMHG | TEMPERATURE: 99.1 F | SYSTOLIC BLOOD PRESSURE: 134 MMHG | OXYGEN SATURATION: 95 % | BODY MASS INDEX: 26.72 KG/M2 | RESPIRATION RATE: 16 BRPM | WEIGHT: 145.2 LBS | HEIGHT: 62 IN

## 2018-08-29 LAB
ANION GAP SERPL CALCULATED.3IONS-SCNC: 7 MMOL/L (ref 3–14)
BUN SERPL-MCNC: 6 MG/DL (ref 7–30)
CALCIUM SERPL-MCNC: 8.6 MG/DL (ref 8.5–10.1)
CHLORIDE SERPL-SCNC: 110 MMOL/L (ref 94–109)
CO2 SERPL-SCNC: 27 MMOL/L (ref 20–32)
CREAT SERPL-MCNC: 0.9 MG/DL (ref 0.52–1.04)
ERYTHROCYTE [DISTWIDTH] IN BLOOD BY AUTOMATED COUNT: 13.8 % (ref 10–15)
GFR SERPL CREATININE-BSD FRML MDRD: 65 ML/MIN/1.7M2
GLUCOSE BLDC GLUCOMTR-MCNC: 108 MG/DL (ref 70–99)
GLUCOSE BLDC GLUCOMTR-MCNC: 126 MG/DL (ref 70–99)
GLUCOSE BLDC GLUCOMTR-MCNC: 153 MG/DL (ref 70–99)
GLUCOSE BLDC GLUCOMTR-MCNC: 175 MG/DL (ref 70–99)
GLUCOSE BLDC GLUCOMTR-MCNC: 179 MG/DL (ref 70–99)
GLUCOSE BLDC GLUCOMTR-MCNC: 60 MG/DL (ref 70–99)
GLUCOSE BLDC GLUCOMTR-MCNC: 71 MG/DL (ref 70–99)
GLUCOSE BLDC GLUCOMTR-MCNC: 93 MG/DL (ref 70–99)
GLUCOSE BLDC GLUCOMTR-MCNC: 96 MG/DL (ref 70–99)
GLUCOSE SERPL-MCNC: 102 MG/DL (ref 70–99)
HCT VFR BLD AUTO: 35.8 % (ref 35–47)
HGB BLD-MCNC: 11.8 G/DL (ref 11.7–15.7)
MCH RBC QN AUTO: 30.2 PG (ref 26.5–33)
MCHC RBC AUTO-ENTMCNC: 33 G/DL (ref 31.5–36.5)
MCV RBC AUTO: 92 FL (ref 78–100)
PLATELET # BLD AUTO: 290 10E9/L (ref 150–450)
POTASSIUM SERPL-SCNC: 4.5 MMOL/L (ref 3.4–5.3)
RBC # BLD AUTO: 3.91 10E12/L (ref 3.8–5.2)
SODIUM SERPL-SCNC: 143 MMOL/L (ref 133–144)
WBC # BLD AUTO: 9 10E9/L (ref 4–11)

## 2018-08-29 PROCEDURE — 25800025 ZZH RX 258: Performed by: STUDENT IN AN ORGANIZED HEALTH CARE EDUCATION/TRAINING PROGRAM

## 2018-08-29 PROCEDURE — 85027 COMPLETE CBC AUTOMATED: CPT | Performed by: STUDENT IN AN ORGANIZED HEALTH CARE EDUCATION/TRAINING PROGRAM

## 2018-08-29 PROCEDURE — 80048 BASIC METABOLIC PNL TOTAL CA: CPT | Performed by: STUDENT IN AN ORGANIZED HEALTH CARE EDUCATION/TRAINING PROGRAM

## 2018-08-29 PROCEDURE — 99239 HOSP IP/OBS DSCHRG MGMT >30: CPT | Mod: GC | Performed by: PEDIATRICS

## 2018-08-29 PROCEDURE — 25000132 ZZH RX MED GY IP 250 OP 250 PS 637: Mod: GY | Performed by: INTERNAL MEDICINE

## 2018-08-29 PROCEDURE — 25000132 ZZH RX MED GY IP 250 OP 250 PS 637: Mod: GY | Performed by: STUDENT IN AN ORGANIZED HEALTH CARE EDUCATION/TRAINING PROGRAM

## 2018-08-29 PROCEDURE — 36415 COLL VENOUS BLD VENIPUNCTURE: CPT | Performed by: STUDENT IN AN ORGANIZED HEALTH CARE EDUCATION/TRAINING PROGRAM

## 2018-08-29 PROCEDURE — 00000146 ZZHCL STATISTIC GLUCOSE BY METER IP

## 2018-08-29 PROCEDURE — A9270 NON-COVERED ITEM OR SERVICE: HCPCS | Mod: GY | Performed by: STUDENT IN AN ORGANIZED HEALTH CARE EDUCATION/TRAINING PROGRAM

## 2018-08-29 PROCEDURE — 25000128 H RX IP 250 OP 636: Performed by: STUDENT IN AN ORGANIZED HEALTH CARE EDUCATION/TRAINING PROGRAM

## 2018-08-29 PROCEDURE — A9270 NON-COVERED ITEM OR SERVICE: HCPCS | Mod: GY | Performed by: INTERNAL MEDICINE

## 2018-08-29 RX ORDER — HYDROCORTISONE 10 MG/1
10 TABLET ORAL 2 TIMES DAILY
Status: DISCONTINUED | OUTPATIENT
Start: 2018-08-29 | End: 2018-08-29 | Stop reason: HOSPADM

## 2018-08-29 RX ADMIN — OXYCODONE HYDROCHLORIDE 5 MG: 5 TABLET ORAL at 02:47

## 2018-08-29 RX ADMIN — NYSTATIN 100000 UNITS: 100000 SUSPENSION ORAL at 08:23

## 2018-08-29 RX ADMIN — NYSTATIN 100000 UNITS: 100000 SUSPENSION ORAL at 13:10

## 2018-08-29 RX ADMIN — DICYCLOMINE HYDROCHLORIDE 10 MG: 10 CAPSULE ORAL at 08:30

## 2018-08-29 RX ADMIN — LINACLOTIDE 145 MCG: 145 CAPSULE, GELATIN COATED ORAL at 08:30

## 2018-08-29 RX ADMIN — PREGABALIN 150 MG: 75 CAPSULE ORAL at 13:10

## 2018-08-29 RX ADMIN — ACETAMINOPHEN 1000 MG: 500 TABLET, FILM COATED ORAL at 08:24

## 2018-08-29 RX ADMIN — ASPIRIN 81 MG CHEWABLE TABLET 81 MG: 81 TABLET CHEWABLE at 08:24

## 2018-08-29 RX ADMIN — NYSTATIN 100000 UNITS: 100000 SUSPENSION ORAL at 16:48

## 2018-08-29 RX ADMIN — DEXTROSE MONOHYDRATE 25 ML: 500 INJECTION PARENTERAL at 00:34

## 2018-08-29 RX ADMIN — PANTOPRAZOLE SODIUM 40 MG: 40 TABLET, DELAYED RELEASE ORAL at 08:26

## 2018-08-29 RX ADMIN — PANCRELIPASE 72000 UNITS: 60000; 12000; 38000 CAPSULE, DELAYED RELEASE PELLETS ORAL at 13:10

## 2018-08-29 RX ADMIN — PANCRELIPASE 72000 UNITS: 60000; 12000; 38000 CAPSULE, DELAYED RELEASE PELLETS ORAL at 08:22

## 2018-08-29 RX ADMIN — DULOXETINE HYDROCHLORIDE 60 MG: 60 CAPSULE, DELAYED RELEASE ORAL at 08:26

## 2018-08-29 RX ADMIN — TOPIRAMATE 100 MG: 50 TABLET ORAL at 08:26

## 2018-08-29 RX ADMIN — LEVOTHYROXINE SODIUM 112 MCG: 112 TABLET ORAL at 08:30

## 2018-08-29 RX ADMIN — DULOXETINE HYDROCHLORIDE 30 MG: 30 CAPSULE, DELAYED RELEASE ORAL at 08:25

## 2018-08-29 RX ADMIN — DRONABINOL 5 MG: 2.5 CAPSULE ORAL at 08:24

## 2018-08-29 RX ADMIN — ONDANSETRON 4 MG: 2 INJECTION INTRAMUSCULAR; INTRAVENOUS at 08:23

## 2018-08-29 RX ADMIN — HYDROCORTISONE 20 MG: 20 TABLET ORAL at 08:24

## 2018-08-29 RX ADMIN — PREGABALIN 150 MG: 75 CAPSULE ORAL at 08:24

## 2018-08-29 ASSESSMENT — ACTIVITIES OF DAILY LIVING (ADL)
ADLS_ACUITY_SCORE: 12

## 2018-08-29 ASSESSMENT — PAIN DESCRIPTION - DESCRIPTORS
DESCRIPTORS: ACHING

## 2018-08-29 NOTE — PROGRESS NOTES
Endocrine Progress Note  Patient: Chantell Kidd   MRN: [id number]  Date of Service: 08/29/2018    Assessment/Plan:  Chantell Kidd is a 52 year old female with PMHx of chronic pancreatitis s/p TPIAT, nomi-en-Y, and splenectomy in 2012, IDDM on insulin pump, recurrent severe hypoglycemia, hypothyroidism, BRENDAN, prior GJ, and possible adrenal insufficiency who presented on 8/26/18 with severe hypoglycemia.    Patient has history of these severe hypoglycemic episodes. This current episode led to unresponsiveness and needing help from family and EMS. She had a confirmed low serum glucose of 37 during admission. She has hypoglycemic unawareness as well. During previous admissions for this, her C-peptide has either been low or undetectable, with insulin levels being high - this picture fits excess exogenous insulin.  The hypoglycemic episode prior to this admission was likely multifactorial in the setting of alcohol use, weight loss, poor appetite with low glycogen stores, and being more active the day of admission.  Given her weight loss, her insulin needs have likely declined. She may have some beta-cell function from her islet cell transplant. In the setting of continued D10 infusion, this would lead to continued insulin secretion from these cells, which may prevent her blood sugars from rising as you would expect and could potentially cause some reactive lows.      # Severe, recurrent hypoglycemia  Improved. D10 off since this morning. No lows and actually is trending more towards hyperglycemia  - Decrease hydrocortisone back to 10mg BID (this is her home dose)  - Encourage PO intake  - See insulin plan below     # IDDM on home insulin pump  Patient with hx of TPIAT with reported beta-cell function, but she is, at times, reliant on daily insulin. She has detectable c-peptide levels, most recently 02/2018. Follows with Dr. Maher in clinic. Hypoglycemia as above. Patient's c-peptide of 0.3 on 8/28/18 with a BG of 109 is  interesting. This suggests that her beta-cells are not that active, and in context of continued hypoglycemia, could indicate exogenous insulin   - No insulin at discharge. Recommend that when patient gets home, she should attach her DEXCOM to monitor for lows or more likely, high BGs. If she has hyperglycemia at home, she will contact Dr. Maher or her nurse coordinator for advice on how to restart her insulin pump - this is what she has done in the past with previous hypoglycemia admissions. She has direct contact information for both of these providers. We have also provided her with Dr. Parikh's clinic phone number in case she can't get in touch with those providers.       # Possible secondary adrenal insufficiency  Patient originally diagnosed in 03/2016 with possible opioid-related secondary AI, however this diagnosis is questioned by her primary endocrinologist. She has remained on 10mg hydrocortisone BID since 2016  - Decrease hydrocortisone back to 10mg BID (this is her home dose)    Patient seen and discussed with Dr. Parikh    Recommendations were discussed directly with patient and primary medicine team    Alli Ribera MD  PGY4, Endocrinology Fellow  Pager: [phone]    Subjective:  Overnight was on D10 @ 75cc/hr. Her lowest blood sugar overnight was 60 mg/dL, she was asymptomatic, and was given D50 for this.  This morning, D10 was turned off. Two BG readings after this both > 100mg/dL. Appetite is better. She ate most of dinner, but did throw up afterwards. She ate 50% of breakfast and about the same amount for lunch, and has tolerated those meals.  No hypoglycemia this morning or afternoon. Patient will likely discharge this evening    Medications:  Current Facility-Administered Medications   Medication     acetaminophen (TYLENOL) tablet 1,000 mg     alum & mag hydroxide-simethicone (MYLANTA/MAALOX) 200-200-25 MG chewable tablet 1 tablet     amylase-lipase-protease (CREON 12) 68170 units per capsule 72,000  Units     aspirin chewable tablet 81 mg     dextrose 10% infusion     glucose gel 15-30 g    Or     dextrose 50 % injection 25-50 mL    Or     glucagon injection 1 mg     diclofenac (VOLTAREN) 1 % topical gel 2 g     dicyclomine (BENTYL) capsule 10 mg     dronabinol (MARINOL) capsule 5 mg     DULoxetine (CYMBALTA) EC capsule 30 mg     DULoxetine (CYMBALTA) EC capsule 60 mg     hydrocortisone (CORTEF) tablet 20 mg     levothyroxine (SYNTHROID/LEVOTHROID) tablet 112 mcg     lidocaine (LMX4) cream     lidocaine 1 % 1 mL     linaclotide (LINZESS) capsule 145 mcg     magnesium sulfate 2 g in NS intermittent infusion ([facility] or [facility] Cmpd)     magnesium sulfate 4 g in 100 mL sterile water (premade)     melatonin tablet 1 mg     naloxone (NARCAN) injection 0.1-0.4 mg     nystatin (MYCOSTATIN) 120193 unit/mL suspension 100,000 Units     ondansetron (ZOFRAN-ODT) ODT tab 4 mg    Or     ondansetron (ZOFRAN) injection 4 mg     oxyCODONE IR (ROXICODONE) tablet 5 mg     pantoprazole (PROTONIX) EC tablet 40 mg     polyethylene glycol (MIRALAX/GLYCOLAX) Packet 17 g     potassium chloride (KLOR-CON) Packet 20-40 mEq     potassium chloride (KLOR-CON) Packet 60 mEq     potassium chloride 10 mEq in 100 mL sterile water intermittent infusion (premix)     potassium chloride 20 mEq in 50 mL intermittent infusion     potassium chloride SA (K-DUR/KLOR-CON M) CR tablet 20-40 mEq     potassium phosphate 15 mmol in D5W 250 mL intermittent infusion     potassium phosphate 20 mmol in D5W 250 mL intermittent infusion     potassium phosphate 20 mmol in D5W 500 mL intermittent infusion     potassium phosphate 25 mmol in D5W 500 mL intermittent infusion     pregabalin (LYRICA) capsule 150 mg     sennosides (SENOKOT) tablet 1-2 tablet     sodium chloride (PF) 0.9% PF flush 3 mL     sodium chloride (PF) 0.9% PF flush 3 mL     topiramate (TOPAMAX) tablet 100 mg        Physical Examination:  Vital signs:  Temp: 98.2  F (36.8  C) Temp src: Oral BP:  "127/79   Heart Rate: 58 Resp: 18 SpO2: 96 % O2 Device: None (Room air)   Height: 157.5 cm (5' 2\") Weight: 65.9 kg (145 lb 3.2 oz)  Estimated body mass index is 26.56 kg/(m^2) as calculated from the following:    Height as of this encounter: 1.575 m (5' 2\").    Weight as of this encounter: 65.9 kg (145 lb 3.2 oz).    Gen: well appearing, NAD, pleasant and conversant  HEENT: anicteric  Resp: breathing comfortably  Neuro: alert, answers questions appropriately    Labs:   Reviewed in [facility]        Endocrine Staff Note    The patient was seen and examined by me with Dr. Ribera. His note details our mutual findings and plan.    Marlena Parikh MD  Endocrinology and Diabetes   [phone]      "

## 2018-08-29 NOTE — DISCHARGE SUMMARY
[facility], [place]    Internal Medicine Discharge Summary- Sydni Service    Date of Admission:  8/26/2018  Date of Discharge:  8/29/2018  Discharging Attending Provider: Dr. Waldemar Hanley  Discharge Team: Sydni 5    Discharge Diagnoses    - Hypoglycemia   - Hypokalemia   - Abdominal Pain    - Pancreatectomy w/ autoislet cell transplant   - Type 1 DM   - CIERRA   - Hypothyroidism   - Adrenal insufficiency    Follow-ups Needed After Discharge    - Holding insulin on discharge due to hypoglycemia      - Patient to follow up with endocrine outpatient team to instruct when to restart insulin pump based on daily BS readings   - Continue 10mg BID hydrocortisone     Hospital Course   Chantell Kidd was admitted on 8/26/2018 for hypoglycemia in the setting of DM I with an insulin pump.  The following problems were addressed during her hospitalization:    # Hypoglycemia   # Hypokalemia - improved  # S/p pancreatectomy with autoislet cell transplant   # Type 1 DM   Pt has used insulin pump for 5-6 yrs, with occasional hypoglycemic episodes resulting in ED visits. Ate poorly due to feeling ill, but bolused insulin day of admission despite poor intake. Was drinking wine coolers, came in with ETOH level of 0.194. ED BG 49,  K+ 2.9 on admission, baseline normal. Likely 2/2 insulin infusion in setting of poor PO intake. Hypoglycemia and hypokalemia likely both 2/2 insulin overdose. Required D10 infusion and multiple amps of D50 throughout stay. Temporarily increased steroids to 20 BID, but decreased back to PTA dose due to improved oral intake at time of discharge. Endocrine was involved in the care of the patient throughout her stay.    - Instructed to not restart insulin pump and to monitor sugars as well as be in close contact with outpatient endocrine team regarding when to restart      # Abdominal Pain  # Leukocytosis  H/o chronic abd pain. CT read concerning for mild gastroenteritis, no discussion of  "pancreatic involvement. H/o chronic pancreatitis s/p islet auto-transplant. Recent etoh consumption, cannot r/o pancreatitis exacerbation.. Abd pain likely exacerbated by current episode of hypoglycemia. WBC 18 on admission --> 11 after fluids.    - Patient told RN she has \"oxycodone, dilaudid, and methadone\" at home for as needed - as prescribed by pain doctor. No notes from pain since 2015 and no opiates on admission drug screen. Multiple previous notes in chart where patient is seeing different doctors and asking for opioids for abdominal pain.      # Lactic acidosis  3.8 on admission --> 0.9 with fluids. Likely secondary to dehydration and hypoglycemia. No other sign of systemic infection. No antibiotics at this point, will closely monitor    # Discharge Pain Plan:   - During her hospitalization, Chantell experienced pain due to chronic abdominal pain.  The pain plan for discharge was discussed with Chantell and the plan was created in a collaborative fashion.    - Non opioid pain medication encouraged. No opioids on discharge.     Consultations This Hospital Stay   ENDOCRINE DIABETES ADULT IP CONSULT  NEPHROLOGY KIDNEY/PANCREAS TRANSPLANT ADULT IP CONSULT  VASCULAR ACCESS CARE ADULT IP CONSULT  MEDICATION HISTORY IP PHARMACY CONSULT     Code Status   Full Code       The patient was discussed with Dr. Waldemar Severino MD  [facility]  Pager: [phone]  ______________________________________________________________________    Physical Exam   Vital Signs: Temp: 99.1  F (37.3  C) Temp src: Oral BP: 134/81   Heart Rate: 78 Resp: 16 SpO2: 95 % O2 Device: None (Room air)    Weight: 145 lbs 3.2 oz    Gen: NAD, alert, cooperative.  HEENT: EOMI, no scleral icterus, tracking appropriately  Resp: CTAB, no crackles or wheezes, no increased WOB  Cardiac: RRR, no S3/S4, no M/R/G appreciated  GI: soft, normoactive bowel sounds, no tenderness when pushing with stethoscope - endorses pain when palpating " with hand. Non-distended  Ext: WWP, no edema, spontaneous movement in all 4  Neuro: alert, CN 2-12 grossly intact, appropriate mentation    Significant Results and Procedures   Most Recent 3 CBC's:  Recent Labs   Lab Test  08/29/18   0521  08/28/18   0647  08/27/18   0936   WBC  9.0  11.6*  11.1*   HGB  11.8  11.6*  11.5*   MCV  92  92  91   PLT  290  302  281     Most Recent 3 BMP's:  Recent Labs   Lab Test  08/29/18   0521  08/28/18   0647  08/27/18   2032   08/27/18   1116  08/27/18   0936   NA  143  142   --    --    --   141   POTASSIUM  4.5  3.7  4.3   < >   --   3.2*   CHLORIDE  110*  110*   --    --    --   108   CO2  27  25   --    --    --   25   BUN  6*  2*   --    --    --   6*   CR  0.90  0.71   --    --    --   0.59   ANIONGAP  7  7   --    --    --   8   ELIZA  8.6  8.4*   --    --    --   8.2*   GLC  102*  109*   --    --   37*  175*    < > = values in this interval not displayed.     Most Recent 2 LFT's:  Recent Labs   Lab Test  08/27/18   0936  08/26/18   2236   AST  42  35   ALT  38  43   ALKPHOS  100  106   BILITOTAL  0.4  0.2       Pending Results   These results will be followed up by PCP  Unresulted Labs Ordered in the Past 30 Days of this Admission     Date and Time Order Name Status Description    8/27/2018 0856 Blood culture Preliminary     8/27/2018 0856 Blood culture Preliminary              Primary Care Physician   Ron Morgan    Discharge Disposition   Discharged to home  Condition at discharge: Stable    Discharge Orders     Reason for your hospital stay   You were hospitalized for low blood sugars in the setting of too much insulin from your pump combined with poor oral intake. It is likely you had stomach/digestive inflammation that was decreasing your oral intake. You were treated with IV dextrose and encouraged to keep up your oral intake of food.     [facility]ult [facility] Follow-up and recommended labs and tests   Follow up with Dr. Maher , at (location with clinic name or  [facility]) [facility], [facility], within 1-2 weeks  to evaluate medication change and for hospital follow- up. The following labs/tests are recommended: Blood sugar check.    Appointments on [place] and/or [facility] (with [facility] or [facility] provider or service). Call [phone] if you haven't heard regarding these appointments within 7 days of discharge.     Activity   Your activity upon discharge: activity as tolerated     When to contact your care team   Call your primary doctor if you have any of the following: temperature greater than 100.4 or less than 95 or blood sugars greater than 200 or less than 60.     Discharge Instructions   Do NOT reattach your pump. Do not continue to take insulin. Continue to test your blood sugar as normal and work with your endocrinology team to determine when to restart your pump. Continue taking your steroids as previously prescribed (10mg BID).     Full Code     Diet   Follow this diet upon discharge: Orders Placed This Encounter     Consistent Carbohydrate Diet 8479-4218 Calories: Moderate Consistent CHO (4-6 CHO units/meal)       Discharge Medications   Current Discharge Medication List      CONTINUE these medications which have CHANGED    Details   glucagon (GLUCAGON EMERGENCY) 1 MG kit Inject 1 mg into the muscle once for 1 dose  Qty: 1 mg, Refills: 1    Associated Diagnoses: Hypoglycemia unawareness in type 1 diabetes mellitus (H); Type 1 diabetes mellitus with hypoglycemic coma (H)         CONTINUE these medications which have NOT CHANGED    Details   acetaminophen 500 MG CAPS Take 1,000 mg by mouth three times a day as needed.      alendronate (FOSAMAX) 70 MG tablet Take 1 tablet (70 mg) by mouth every 7 days On Sundays take first thing in the morning with plain water and remain upright for at least 30 minutes and until after first food of the day    Do not restart Fosamax (alendronate) until your difficulty swallowing has resolved and you have finished the entire course of  fluconazole (Diflucan).  Qty: 4 tablet, Refills: 0    Associated Diagnoses: Osteoporosis      alum & mag hydroxide-simethicone (MYLANTA/MAALOX) 200-200-25 MG CHEW chewable tablet Take 1 tablet by mouth 3 times daily as needed for indigestion      amylase-lipase-protease (CREON 12) 92111 units CPEP Take 6 to 8 capsules by mouth with meals and take 4 capsules with snacks      aspirin 81 MG tablet Take 81 mg by mouth daily.      cyclobenzaprine (FLEXERIL) 5 MG tablet Take 10 mg by mouth 2 times daily as needed for muscle spasms      diclofenac (VOLTAREN) 1 % GEL 2 g Apply 2 g to skin four times a day as needed (to affected upper extremity joint(s)). Maximum 8g/day per joint, 16g/day total.      dicyclomine (BENTYL) 10 MG capsule TAKE ONE CAPSULE BY MOUTH EVERY 6 HOURS AS NEEDED  Qty: 40 capsule, Refills: 3    Associated Diagnoses: Abdominal pain, epigastric      dronabinol (MARINOL) 2.5 MG capsule Take 2 capsules (5 mg) by mouth 2 times daily as needed  Qty: 56 capsule, Refills: 0    Associated Diagnoses: Routine health maintenance      !! DULoxetine (CYMBALTA) 30 MG EC capsule Take 1 capsule (30 mg) by mouth daily With 60mg capsule for total dose of 90mg  Qty: 90 capsule, Refills: 1    Associated Diagnoses: Major depressive disorder, recurrent episode, moderate (H); CIERRA (generalized anxiety disorder)      !! DULoxetine (CYMBALTA) 60 MG EC capsule Take 1 capsule (60 mg) by mouth daily With 30mg capsule for total dose of 90mg  Qty: 90 capsule, Refills: 1    Associated Diagnoses: Major depressive disorder, recurrent episode, moderate (H); CIERRA (generalized anxiety disorder)      fluconazole (DIFLUCAN) 200 MG tablet Take 2 tabs the first day and 1 tab for the next 13 days  Qty: 15 tablet, Refills: 0      furosemide (LASIX) 20 MG tablet Take 1 tablet (20 mg) by mouth 2 times daily  Qty: 180 tablet, Refills: 2    Associated Diagnoses: Edema, unspecified type      hydrocortisone (CORTEF) 10 MG tablet Take 10 mg in the morning  and 10 mg at bedtime. Watch for hypoglycemia recurrence.  Qty: 60 tablet, Refills: 3    Associated Diagnoses: Hypoglycemia; Adrenal insufficiency (H)      levothyroxine (SYNTHROID/LEVOTHROID) 112 MCG tablet Take 1 tablet (112 mcg) by mouth daily  Qty: 30 tablet, Refills: 0    Comments: Must see PCP for further refills. Appointment and labs due.  Associated Diagnoses: Hypothyroidism      linaclotide (LINZESS) 145 MCG capsule Take 1 capsule (145 mcg) by mouth every morning (before breakfast)  Qty: 30 capsule, Refills: 0    Comments: Must see PCP for further refills.  Associated Diagnoses: Constipation, unspecified constipation type; Chronic back pain, unspecified back location, unspecified back pain laterality; Physical deconditioning; Primary insomnia      metoclopramide (REGLAN) 5 MG tablet Take 5 mg by mouth 2 times daily as needed      nystatin (MYCOSTATIN) 77489 unit/0.5mL SUSP Take 1 mL (100,000 Units) by mouth 4 times daily  Qty: 60 mL, Refills: 1    Associated Diagnoses: Odynophagia      ondansetron (ZOFRAN-ODT) 4 MG ODT tab DISSOLVE ONE TABLET ON THE TONGUE EVERY 6 HOURS AS NEEDED FOR NAUSEA AND VOMITING  Qty: 60 tablet, Refills: 2    Associated Diagnoses: Nausea      pantoprazole (PROTONIX) 40 MG enteric coated tablet Take 1 tablet (40 mg) by mouth 2 times daily  Qty: 180 tablet, Refills: 3    Associated Diagnoses: H. pylori infection      polyethylene glycol (MIRALAX/GLYCOLAX) packet Take 1 packet by mouth 2 times daily as needed for constipation   Qty: 14 each, Refills: 5    Associated Diagnoses: Chronic constipation      potassium chloride SA (K-DUR/KLOR-CON M) 20 MEQ CR tablet Take 1 tablet (20 mEq) by mouth daily  Qty: 90 tablet, Refills: 1    Associated Diagnoses: Hypokalemia      pregabalin (LYRICA) 150 MG capsule Take 1 capsule (150 mg) by mouth 3 times daily  Qty: 90 capsule, Refills: 5    Associated Diagnoses: Chronic generalized abdominal pain      senna-docusate (SENOKOT-S;PERICOLACE) 8.6-50 MG  per tablet Take 2 tablets by mouth daily      SUMAtriptan (IMITREX) 50 MG tablet Take 1 tablet (50 mg) by mouth at onset of headache for migraine Take 1 Tab by mouth Once as needed for Migraine Headache. May repeat after two hours.  Maximum dose 200 mg/24 hours.  Qty: 30 tablet, Refills: 1    Associated Diagnoses: Migraine      topiramate (TOPAMAX) 100 MG tablet Take 1 tablet (100 mg) by mouth 2 times daily  Qty: 180 tablet, Refills: 1    Associated Diagnoses: Migraine, unspecified, not intractable, without status migrainosus      traZODone (DESYREL) 100 MG tablet TAKE 1-2 TABLETS BY MOUTH AN HOUR BEFORE BEDTIME  Qty: 100 tablet, Refills: 1    Associated Diagnoses: Primary insomnia; Constipation, unspecified constipation type; Chronic back pain, unspecified back location, unspecified back pain laterality; Physical deconditioning      ACCU-CHEK MULTICLIX LANCETS MISC by Lancet route. Use to test blood sugar daily or as directed.  Qty: 102 each, Refills: prn    Associated Diagnoses: Chronic abdominal pain      !! blood glucose monitoring (NOEMI CONTOUR NEXT) test strip Use to test blood sugar 8 times daily.  Qty: 240 each, Refills: 11    Associated Diagnoses: Post-pancreatectomy diabetes (H)      !! blood glucose monitoring (NO BRAND SPECIFIED) test strip Use to test blood glucoses 6-8 times per day.  Qty: 240 each, Refills: 11    Associated Diagnoses: Post-pancreatectomy diabetes (H)      insulin pen needle needle RX# [id number]   Pen Needle UC 31G UF IV Mini   Use 4-8 needles per day for insulin injections.      Qty: 2 Box, Refills: 11    Associated Diagnoses: Chronic abdominal pain      Nutritional Supplements (BOOST HIGH PROTEIN) LIQD Also can use Ensure clear (available over the counter)  Refills: 0    Associated Diagnoses: Pancreatic insufficiency       !! - Potential duplicate medications found. Please discuss with provider.      STOP taking these medications       insulin aspart (NOVOLOG VIAL) 100 UNITS/ML VIAL  Comments:   Reason for Stopping:         senna (SENNA LAX) 8.6 MG tablet Comments:   Reason for Stopping:             Allergies   Allergies   Allergen Reactions     Corticosteroids Other (See Comments)     All oral,IV and injectable steroids are contraindicated (unless in life threatening situations) in Islet Auto transplant recipients. They can cause irreversible loss of islet cell function. Please contact patients transplant care coordinator Malini ORDAZ @[phone]/Pager [phone]  and Endocrinologist prior to administration.     Chocolate Flavor Rash     Breaks out when eats chocolate

## 2018-08-29 NOTE — PLAN OF CARE
"Problem: Patient Care Overview  Goal: Plan of Care/Patient Progress Review  Outcome: Improving  /78 (BP Location: Right arm)  Pulse 87  Temp 99  F (37.2  C)  Resp 17  Ht 1.575 m (5' 2\")  Wt 61.8 kg (136 lb 3.2 oz)  SpO2 100%  BMI 24.91 kg/m2   Neuro: A&Ox4. Follows commands. PERRLA.   Cardiac: SR. HR 60s-70s. SBP 120s-130s. Afebrile.   Respiratory: Sating >92% on RA.  GI/: Adequate urine output. BM x0.  Diet/appetite: Regular diet. Fair appetite. Encouraged oral intake. Pt ate 75% of her dinner.  Activity: Standby assist up to bathroom.   Pain: Abdominal pain. Managed with prn oxy and tylenol. Gave a one time dose of oxy 2.5mg in addition to scheduled dose for unresolved pain.   Skin: No new deficits noted.  LDA's: Rt and lft PIV intact. D10 with .45% NS infusing at 75mL/hr. Blood sugars Q2h. Lowest blood sugar was 60 and highest 138. Gave 25mL of D50 x1.     Plan: Continue with POC. Notify primary team with changes.        Problem: Diabetes Comorbidity  Goal: Diabetes  Patient comorbidity will be monitored for signs and symptoms of hyperglycemia or hypoglycemia. Problems will be absent, minimized or managed by discharge/transition of care.   Outcome: No Change   Q2h Blood sugars.  Lowest blood sugar was 60 and highest was 138. D10 with .45% NS infusing at 75 mL/hr.  Will continue to monitor.            "

## 2018-08-29 NOTE — PLAN OF CARE
Problem: Patient Care Overview  Goal: Plan of Care/Patient Progress Review  Outcome: No Change  Shift: 3745-5207     Neuro: A&Ox4. C/o headache throughout shift, possible start of migraine per pt. Some dizziness with exertion/ambulating to bathroom, resolves with rest/back to bed.  Cardiac: SR. HR 80s-90s. Afebrile.   Respiratory: Sating >92% on RA. Slightly more dyspneic with exertion today  GI/: Adequate urine output. BM x0. Mild nausea continued most of shift, had 1 emesis after bites of popsicle while brushing teeth this morning; zofran given x1.  Diet/appetite: Regular diet. Poor appetite. Pt consumed couple bites of popsicle & ice cream.  Activity: Assist x1 up to bathroom. Showered today. Tolerating activity okay, felt mild dizziness & dyspnea on exertion, more weakness today. Feels improvement when back to bed.   Pain: Acute + chronic abdominal pain. At acceptable level on current regimen. Managed with prn oxy and tylenol.  Skin/LDA's: No new deficits noted. Rt and lft PIV intact. D10 in .45% NS infusing at 75mL/hr (changed during bedside report this evening). Gave amp of D50% x2 between 2318-7221 this morning for blood glucose of 54 & 64.     Plan: Endocrine increased Hydrocortisone to 20mg 2x daily starting this evening. Continue to encourage PO intake. Continue with POC. Notify primary team with changes.

## 2018-08-30 ENCOUNTER — TELEPHONE (OUTPATIENT)
Dept: ENDOCRINOLOGY | Facility: CLINIC | Age: 53
End: 2018-08-30

## 2018-08-30 ENCOUNTER — PATIENT OUTREACH (OUTPATIENT)
Dept: CARE COORDINATION | Facility: CLINIC | Age: 53
End: 2018-08-30

## 2018-08-30 NOTE — PROGRESS NOTES
DISCHARGE                         8/29/2018  5:34 PM  ----------------------------------------------------------------------------  Discharged to: Home  Via: private transportation  Accompanied by: Family  Discharge Instructions: diet, activity, medications, follow up appointments, when to call the MD, aftercare instructions.  Prescriptions: ; medication list reviewed & sent with pt  Follow Up Appointments: arranged; information given  Belongings: All sent with pt  IV: d/c'd  Telemetry: d/c'd  Pt exhibits understanding of above discharge instructions; all questions answered.    Discharge Paperwork: Signed, patient declined copy.

## 2018-08-31 NOTE — TELEPHONE ENCOUNTER
I called and spoke with Chantell this afternoon in follow up of her hospitalization. Blood sugars have been 100-200 today, and except for some residual headache she is generally feeling well. We made a plan to restart her insulin pump tomorrow with reduced basal rates as follows:    12AM 0.4 units/hour (was 0.5)   7AM 0.5 units/hour (was 0.6)  No change to bolus settings. She will check in with Dr. Maher tomorrow or early next week. She has contact information for endocrine on-call for use over the weekend if needed. She was comfortable with this plan.     Marlena Parikh MD  Endocrinology and Diabetes   [phone]

## 2018-09-02 LAB
BACTERIA SPEC CULT: NO GROWTH
BACTERIA SPEC CULT: NO GROWTH
Lab: NORMAL
Lab: NORMAL
SPECIMEN SOURCE: NORMAL
SPECIMEN SOURCE: NORMAL

## 2018-09-03 ENCOUNTER — HOSPITAL ENCOUNTER (INPATIENT)
Facility: CLINIC | Age: 53
LOS: 4 days | Discharge: HOME | DRG: 638 | End: 2018-09-07
Attending: EMERGENCY MEDICINE | Admitting: PEDIATRICS
Payer: MEDICARE

## 2018-09-03 ENCOUNTER — APPOINTMENT (OUTPATIENT)
Dept: GENERAL RADIOLOGY | Facility: CLINIC | Age: 53
DRG: 638 | End: 2018-09-03
Attending: EMERGENCY MEDICINE
Payer: MEDICARE

## 2018-09-03 DIAGNOSIS — E16.2 HYPOGLYCEMIA: ICD-10-CM

## 2018-09-03 DIAGNOSIS — Z90.410 POST-PANCREATECTOMY DIABETES (H): ICD-10-CM

## 2018-09-03 DIAGNOSIS — B37.0 THRUSH: ICD-10-CM

## 2018-09-03 DIAGNOSIS — Z78.9 ON ENTERAL NUTRITION: ICD-10-CM

## 2018-09-03 DIAGNOSIS — R63.8 DECREASED ORAL INTAKE: ICD-10-CM

## 2018-09-03 DIAGNOSIS — E10.649 UNCONTROLLED TYPE 1 DIABETES MELLITUS WITH HYPOGLYCEMIA WITHOUT COMA (H): ICD-10-CM

## 2018-09-03 DIAGNOSIS — R11.0 NAUSEA: Primary | ICD-10-CM

## 2018-09-03 DIAGNOSIS — R10.84 GENERALIZED ABDOMINAL PAIN: ICD-10-CM

## 2018-09-03 DIAGNOSIS — Z94.9 CELL TRANSPLANT: ICD-10-CM

## 2018-09-03 DIAGNOSIS — K86.81 EXOCRINE PANCREATIC INSUFFICIENCY: ICD-10-CM

## 2018-09-03 DIAGNOSIS — E10.649 TYPE 1 DIABETES MELLITUS WITH HYPOGLYCEMIA AND WITHOUT COMA (H): ICD-10-CM

## 2018-09-03 DIAGNOSIS — E89.1 POST-PANCREATECTOMY DIABETES (H): ICD-10-CM

## 2018-09-03 DIAGNOSIS — E13.9 POST-PANCREATECTOMY DIABETES (H): ICD-10-CM

## 2018-09-03 LAB
ALBUMIN UR-MCNC: NEGATIVE MG/DL
ALBUMIN UR-MCNC: NEGATIVE MG/DL
ANION GAP SERPL CALCULATED.3IONS-SCNC: 6 MMOL/L (ref 3–14)
APPEARANCE UR: CLEAR
APPEARANCE UR: CLEAR
BACTERIA #/AREA URNS HPF: ABNORMAL /HPF
BASOPHILS # BLD AUTO: 0 10E9/L (ref 0–0.2)
BASOPHILS NFR BLD AUTO: 0 %
BILIRUB UR QL STRIP: NEGATIVE
BILIRUB UR QL STRIP: NEGATIVE
BUN SERPL-MCNC: 8 MG/DL (ref 7–30)
CALCIUM SERPL-MCNC: 9.2 MG/DL (ref 8.5–10.1)
CHLORIDE SERPL-SCNC: 105 MMOL/L (ref 94–109)
CO2 SERPL-SCNC: 29 MMOL/L (ref 20–32)
COLOR UR AUTO: ABNORMAL
COLOR UR AUTO: YELLOW
CREAT SERPL-MCNC: 0.7 MG/DL (ref 0.52–1.04)
DIFFERENTIAL METHOD BLD: ABNORMAL
EOSINOPHIL # BLD AUTO: 0 10E9/L (ref 0–0.7)
EOSINOPHIL NFR BLD AUTO: 0 %
ERYTHROCYTE [DISTWIDTH] IN BLOOD BY AUTOMATED COUNT: 13.9 % (ref 10–15)
GFR SERPL CREATININE-BSD FRML MDRD: 86 ML/MIN/1.7M2
GLUCOSE BLDC GLUCOMTR-MCNC: 104 MG/DL (ref 70–99)
GLUCOSE BLDC GLUCOMTR-MCNC: 115 MG/DL (ref 70–99)
GLUCOSE BLDC GLUCOMTR-MCNC: 118 MG/DL (ref 70–99)
GLUCOSE BLDC GLUCOMTR-MCNC: 123 MG/DL (ref 70–99)
GLUCOSE BLDC GLUCOMTR-MCNC: 124 MG/DL (ref 70–99)
GLUCOSE BLDC GLUCOMTR-MCNC: 125 MG/DL (ref 70–99)
GLUCOSE BLDC GLUCOMTR-MCNC: 139 MG/DL (ref 70–99)
GLUCOSE BLDC GLUCOMTR-MCNC: 167 MG/DL (ref 70–99)
GLUCOSE BLDC GLUCOMTR-MCNC: 197 MG/DL (ref 70–99)
GLUCOSE BLDC GLUCOMTR-MCNC: 43 MG/DL (ref 70–99)
GLUCOSE BLDC GLUCOMTR-MCNC: 46 MG/DL (ref 70–99)
GLUCOSE BLDC GLUCOMTR-MCNC: 56 MG/DL (ref 70–99)
GLUCOSE BLDC GLUCOMTR-MCNC: 58 MG/DL (ref 70–99)
GLUCOSE BLDC GLUCOMTR-MCNC: 59 MG/DL (ref 70–99)
GLUCOSE BLDC GLUCOMTR-MCNC: 66 MG/DL (ref 70–99)
GLUCOSE BLDC GLUCOMTR-MCNC: 73 MG/DL (ref 70–99)
GLUCOSE BLDC GLUCOMTR-MCNC: 79 MG/DL (ref 70–99)
GLUCOSE BLDC GLUCOMTR-MCNC: 79 MG/DL (ref 70–99)
GLUCOSE BLDC GLUCOMTR-MCNC: 82 MG/DL (ref 70–99)
GLUCOSE BLDC GLUCOMTR-MCNC: 86 MG/DL (ref 70–99)
GLUCOSE BLDC GLUCOMTR-MCNC: 86 MG/DL (ref 70–99)
GLUCOSE BLDC GLUCOMTR-MCNC: 92 MG/DL (ref 70–99)
GLUCOSE BLDC GLUCOMTR-MCNC: 92 MG/DL (ref 70–99)
GLUCOSE SERPL-MCNC: 124 MG/DL (ref 70–99)
GLUCOSE SERPL-MCNC: 132 MG/DL (ref 70–99)
GLUCOSE UR STRIP-MCNC: >1000 MG/DL
GLUCOSE UR STRIP-MCNC: NEGATIVE MG/DL
HCT VFR BLD AUTO: 42.6 % (ref 35–47)
HGB BLD-MCNC: 14.2 G/DL (ref 11.7–15.7)
HGB UR QL STRIP: NEGATIVE
HGB UR QL STRIP: NEGATIVE
IMM GRANULOCYTES # BLD: 0.1 10E9/L (ref 0–0.4)
IMM GRANULOCYTES NFR BLD: 0.4 %
INSULIN SERPL-ACNC: 45.6 MU/L (ref 3–25)
KETONES UR STRIP-MCNC: NEGATIVE MG/DL
KETONES UR STRIP-MCNC: NEGATIVE MG/DL
LACTATE BLD-SCNC: 2.2 MMOL/L (ref 0.7–2)
LEUKOCYTE ESTERASE UR QL STRIP: ABNORMAL
LEUKOCYTE ESTERASE UR QL STRIP: ABNORMAL
LIPASE SERPL-CCNC: 35 U/L (ref 73–393)
LYMPHOCYTES # BLD AUTO: 1.3 10E9/L (ref 0.8–5.3)
LYMPHOCYTES NFR BLD AUTO: 5.5 %
MCH RBC QN AUTO: 31 PG (ref 26.5–33)
MCHC RBC AUTO-ENTMCNC: 33.3 G/DL (ref 31.5–36.5)
MCV RBC AUTO: 93 FL (ref 78–100)
MONOCYTES # BLD AUTO: 0.7 10E9/L (ref 0–1.3)
MONOCYTES NFR BLD AUTO: 2.9 %
MUCOUS THREADS #/AREA URNS LPF: PRESENT /LPF
NEUTROPHILS # BLD AUTO: 20.8 10E9/L (ref 1.6–8.3)
NEUTROPHILS NFR BLD AUTO: 91.2 %
NITRATE UR QL: NEGATIVE
NITRATE UR QL: NEGATIVE
NRBC # BLD AUTO: 0 10*3/UL
NRBC BLD AUTO-RTO: 0 /100
OVAL FAT BODIES #/AREA URNS HPF: ABNORMAL /HPF
PH UR STRIP: 6.5 PH (ref 5–7)
PH UR STRIP: 6.5 PH (ref 5–7)
PLATELET # BLD AUTO: 351 10E9/L (ref 150–450)
POTASSIUM SERPL-SCNC: 3.1 MMOL/L (ref 3.4–5.3)
POTASSIUM SERPL-SCNC: 3.3 MMOL/L (ref 3.4–5.3)
RBC # BLD AUTO: 4.58 10E12/L (ref 3.8–5.2)
RBC #/AREA URNS AUTO: 4 /HPF (ref 0–2)
RBC #/AREA URNS AUTO: <1 /HPF (ref 0–2)
SODIUM SERPL-SCNC: 139 MMOL/L (ref 133–144)
SOURCE: ABNORMAL
SOURCE: ABNORMAL
SP GR UR STRIP: 1 (ref 1–1.03)
SP GR UR STRIP: 1.01 (ref 1–1.03)
SQUAMOUS #/AREA URNS AUTO: 2 /HPF (ref 0–1)
TRANS CELLS #/AREA URNS HPF: <1 /HPF (ref 0–1)
UROBILINOGEN UR STRIP-MCNC: NORMAL MG/DL (ref 0–2)
UROBILINOGEN UR STRIP-MCNC: NORMAL MG/DL (ref 0–2)
WBC # BLD AUTO: 22.8 10E9/L (ref 4–11)
WBC #/AREA URNS AUTO: 1 /HPF (ref 0–5)
WBC #/AREA URNS AUTO: 14 /HPF (ref 0–5)

## 2018-09-03 PROCEDURE — 99285 EMERGENCY DEPT VISIT HI MDM: CPT | Mod: Z6 | Performed by: EMERGENCY MEDICINE

## 2018-09-03 PROCEDURE — 96374 THER/PROPH/DIAG INJ IV PUSH: CPT | Performed by: EMERGENCY MEDICINE

## 2018-09-03 PROCEDURE — 40000141 ZZH STATISTIC PERIPHERAL IV START W/O US GUIDANCE

## 2018-09-03 PROCEDURE — 25000128 H RX IP 250 OP 636: Performed by: PEDIATRICS

## 2018-09-03 PROCEDURE — 80048 BASIC METABOLIC PNL TOTAL CA: CPT | Performed by: EMERGENCY MEDICINE

## 2018-09-03 PROCEDURE — 81001 URINALYSIS AUTO W/SCOPE: CPT | Performed by: EMERGENCY MEDICINE

## 2018-09-03 PROCEDURE — 87086 URINE CULTURE/COLONY COUNT: CPT | Performed by: EMERGENCY MEDICINE

## 2018-09-03 PROCEDURE — 36415 COLL VENOUS BLD VENIPUNCTURE: CPT | Performed by: INTERNAL MEDICINE

## 2018-09-03 PROCEDURE — 84132 ASSAY OF SERUM POTASSIUM: CPT | Performed by: INTERNAL MEDICINE

## 2018-09-03 PROCEDURE — 25000128 H RX IP 250 OP 636: Performed by: STUDENT IN AN ORGANIZED HEALTH CARE EDUCATION/TRAINING PROGRAM

## 2018-09-03 PROCEDURE — 84681 ASSAY OF C-PEPTIDE: CPT | Performed by: INTERNAL MEDICINE

## 2018-09-03 PROCEDURE — A9270 NON-COVERED ITEM OR SERVICE: HCPCS | Mod: GY | Performed by: EMERGENCY MEDICINE

## 2018-09-03 PROCEDURE — 87088 URINE BACTERIA CULTURE: CPT | Performed by: EMERGENCY MEDICINE

## 2018-09-03 PROCEDURE — 80299 QUANTITATIVE ASSAY DRUG: CPT | Performed by: INTERNAL MEDICINE

## 2018-09-03 PROCEDURE — 83525 ASSAY OF INSULIN: CPT | Performed by: INTERNAL MEDICINE

## 2018-09-03 PROCEDURE — 25800025 ZZH RX 258: Performed by: EMERGENCY MEDICINE

## 2018-09-03 PROCEDURE — 87086 URINE CULTURE/COLONY COUNT: CPT | Performed by: PEDIATRICS

## 2018-09-03 PROCEDURE — 25800025 ZZH RX 258

## 2018-09-03 PROCEDURE — 25000132 ZZH RX MED GY IP 250 OP 250 PS 637: Mod: GY | Performed by: EMERGENCY MEDICINE

## 2018-09-03 PROCEDURE — 81001 URINALYSIS AUTO W/SCOPE: CPT | Performed by: INTERNAL MEDICINE

## 2018-09-03 PROCEDURE — 25000125 ZZHC RX 250: Performed by: STUDENT IN AN ORGANIZED HEALTH CARE EDUCATION/TRAINING PROGRAM

## 2018-09-03 PROCEDURE — 71045 X-RAY EXAM CHEST 1 VIEW: CPT

## 2018-09-03 PROCEDURE — 96375 TX/PRO/DX INJ NEW DRUG ADDON: CPT | Performed by: EMERGENCY MEDICINE

## 2018-09-03 PROCEDURE — 12000026 ZZH R&B TRANSPLANT

## 2018-09-03 PROCEDURE — A9270 NON-COVERED ITEM OR SERVICE: HCPCS | Mod: GY | Performed by: STUDENT IN AN ORGANIZED HEALTH CARE EDUCATION/TRAINING PROGRAM

## 2018-09-03 PROCEDURE — 25000132 ZZH RX MED GY IP 250 OP 250 PS 637: Mod: GY | Performed by: STUDENT IN AN ORGANIZED HEALTH CARE EDUCATION/TRAINING PROGRAM

## 2018-09-03 PROCEDURE — 83605 ASSAY OF LACTIC ACID: CPT | Performed by: EMERGENCY MEDICINE

## 2018-09-03 PROCEDURE — 40000556 ZZH STATISTIC PERIPHERAL IV START W US GUIDANCE

## 2018-09-03 PROCEDURE — 99223 1ST HOSP IP/OBS HIGH 75: CPT | Mod: AI | Performed by: PEDIATRICS

## 2018-09-03 PROCEDURE — 25000128 H RX IP 250 OP 636: Performed by: EMERGENCY MEDICINE

## 2018-09-03 PROCEDURE — 25800025 ZZH RX 258: Performed by: STUDENT IN AN ORGANIZED HEALTH CARE EDUCATION/TRAINING PROGRAM

## 2018-09-03 PROCEDURE — 25000131 ZZH RX MED GY IP 250 OP 636 PS 637: Mod: GY | Performed by: STUDENT IN AN ORGANIZED HEALTH CARE EDUCATION/TRAINING PROGRAM

## 2018-09-03 PROCEDURE — 99285 EMERGENCY DEPT VISIT HI MDM: CPT | Mod: 25 | Performed by: EMERGENCY MEDICINE

## 2018-09-03 PROCEDURE — 83690 ASSAY OF LIPASE: CPT | Performed by: EMERGENCY MEDICINE

## 2018-09-03 PROCEDURE — 85025 COMPLETE CBC W/AUTO DIFF WBC: CPT | Performed by: EMERGENCY MEDICINE

## 2018-09-03 PROCEDURE — 00000146 ZZHCL STATISTIC GLUCOSE BY METER IP

## 2018-09-03 PROCEDURE — 82947 ASSAY GLUCOSE BLOOD QUANT: CPT | Performed by: INTERNAL MEDICINE

## 2018-09-03 RX ORDER — ONDANSETRON 2 MG/ML
4 INJECTION INTRAMUSCULAR; INTRAVENOUS EVERY 30 MIN PRN
Status: DISCONTINUED | OUTPATIENT
Start: 2018-09-03 | End: 2018-09-03

## 2018-09-03 RX ORDER — DICYCLOMINE HYDROCHLORIDE 10 MG/1
10 CAPSULE ORAL 4 TIMES DAILY
Status: DISCONTINUED | OUTPATIENT
Start: 2018-09-03 | End: 2018-09-07 | Stop reason: HOSPADM

## 2018-09-03 RX ORDER — POTASSIUM CL/LIDO/0.9 % NACL 10MEQ/0.1L
10 INTRAVENOUS SOLUTION, PIGGYBACK (ML) INTRAVENOUS
Status: DISCONTINUED | OUTPATIENT
Start: 2018-09-03 | End: 2018-09-05 | Stop reason: RX

## 2018-09-03 RX ORDER — CEFTRIAXONE 1 G/1
1 INJECTION, POWDER, FOR SOLUTION INTRAMUSCULAR; INTRAVENOUS EVERY 24 HOURS
Status: DISCONTINUED | OUTPATIENT
Start: 2018-09-03 | End: 2018-09-07 | Stop reason: HOSPADM

## 2018-09-03 RX ORDER — NICOTINE POLACRILEX 4 MG
15-30 LOZENGE BUCCAL
Status: DISCONTINUED | OUTPATIENT
Start: 2018-09-03 | End: 2018-09-04

## 2018-09-03 RX ORDER — ACETAMINOPHEN 500 MG
500 TABLET ORAL 3 TIMES DAILY PRN
Status: DISCONTINUED | OUTPATIENT
Start: 2018-09-03 | End: 2018-09-05

## 2018-09-03 RX ORDER — DEXTROSE MONOHYDRATE 100 MG/ML
INJECTION, SOLUTION INTRAVENOUS
Status: DISPENSED
Start: 2018-09-03 | End: 2018-09-04

## 2018-09-03 RX ORDER — TRAZODONE HYDROCHLORIDE 100 MG/1
100 TABLET ORAL
Status: DISCONTINUED | OUTPATIENT
Start: 2018-09-03 | End: 2018-09-07 | Stop reason: HOSPADM

## 2018-09-03 RX ORDER — PROCHLORPERAZINE MALEATE 5 MG
10 TABLET ORAL EVERY 6 HOURS PRN
Status: DISCONTINUED | OUTPATIENT
Start: 2018-09-03 | End: 2018-09-07 | Stop reason: HOSPADM

## 2018-09-03 RX ORDER — CYCLOBENZAPRINE HCL 5 MG
10 TABLET ORAL 2 TIMES DAILY PRN
Status: DISCONTINUED | OUTPATIENT
Start: 2018-09-03 | End: 2018-09-07 | Stop reason: HOSPADM

## 2018-09-03 RX ORDER — ONDANSETRON 4 MG/1
4 TABLET, ORALLY DISINTEGRATING ORAL EVERY 6 HOURS PRN
Status: DISCONTINUED | OUTPATIENT
Start: 2018-09-03 | End: 2018-09-03

## 2018-09-03 RX ORDER — METOCLOPRAMIDE 5 MG/1
5 TABLET ORAL 2 TIMES DAILY PRN
Status: DISCONTINUED | OUTPATIENT
Start: 2018-09-03 | End: 2018-09-07 | Stop reason: HOSPADM

## 2018-09-03 RX ORDER — NICOTINE POLACRILEX 4 MG
15-30 LOZENGE BUCCAL
Status: DISCONTINUED | OUTPATIENT
Start: 2018-09-03 | End: 2018-09-07 | Stop reason: HOSPADM

## 2018-09-03 RX ORDER — POTASSIUM CHLORIDE 29.8 MG/ML
20 INJECTION INTRAVENOUS
Status: DISCONTINUED | OUTPATIENT
Start: 2018-09-03 | End: 2018-09-07 | Stop reason: HOSPADM

## 2018-09-03 RX ORDER — POTASSIUM CHLORIDE 29.8 MG/ML
20 INJECTION INTRAVENOUS ONCE
Status: DISCONTINUED | OUTPATIENT
Start: 2018-09-03 | End: 2018-09-03

## 2018-09-03 RX ORDER — DEXTROSE MONOHYDRATE 25 G/50ML
INJECTION, SOLUTION INTRAVENOUS
Status: DISCONTINUED
Start: 2018-09-03 | End: 2018-09-03 | Stop reason: HOSPADM

## 2018-09-03 RX ORDER — HYDROCORTISONE 5 MG/1
10 TABLET ORAL DAILY
Status: DISCONTINUED | OUTPATIENT
Start: 2018-09-03 | End: 2018-09-03

## 2018-09-03 RX ORDER — AMOXICILLIN 250 MG
2 CAPSULE ORAL DAILY
Status: DISCONTINUED | OUTPATIENT
Start: 2018-09-03 | End: 2018-09-07 | Stop reason: HOSPADM

## 2018-09-03 RX ORDER — POTASSIUM CHLORIDE 7.45 MG/ML
10 INJECTION INTRAVENOUS
Status: COMPLETED | OUTPATIENT
Start: 2018-09-03 | End: 2018-09-03

## 2018-09-03 RX ORDER — FUROSEMIDE 20 MG
20 TABLET ORAL 2 TIMES DAILY
Status: DISCONTINUED | OUTPATIENT
Start: 2018-09-03 | End: 2018-09-07 | Stop reason: HOSPADM

## 2018-09-03 RX ORDER — ONDANSETRON 4 MG/1
4 TABLET, ORALLY DISINTEGRATING ORAL EVERY 6 HOURS PRN
Status: DISCONTINUED | OUTPATIENT
Start: 2018-09-03 | End: 2018-09-07 | Stop reason: HOSPADM

## 2018-09-03 RX ORDER — POTASSIUM CHLORIDE 750 MG/1
40 TABLET, EXTENDED RELEASE ORAL ONCE
Status: DISCONTINUED | OUTPATIENT
Start: 2018-09-03 | End: 2018-09-03

## 2018-09-03 RX ORDER — POTASSIUM CHLORIDE 750 MG/1
20-40 TABLET, EXTENDED RELEASE ORAL
Status: DISCONTINUED | OUTPATIENT
Start: 2018-09-03 | End: 2018-09-07 | Stop reason: HOSPADM

## 2018-09-03 RX ORDER — TOPIRAMATE 100 MG/1
100 TABLET, FILM COATED ORAL 2 TIMES DAILY
Status: DISCONTINUED | OUTPATIENT
Start: 2018-09-03 | End: 2018-09-07 | Stop reason: HOSPADM

## 2018-09-03 RX ORDER — DULOXETIN HYDROCHLORIDE 60 MG/1
60 CAPSULE, DELAYED RELEASE ORAL DAILY
Status: DISCONTINUED | OUTPATIENT
Start: 2018-09-03 | End: 2018-09-07 | Stop reason: HOSPADM

## 2018-09-03 RX ORDER — POTASSIUM CHLORIDE 1.5 G/1.58G
20-40 POWDER, FOR SOLUTION ORAL
Status: DISCONTINUED | OUTPATIENT
Start: 2018-09-03 | End: 2018-09-07 | Stop reason: HOSPADM

## 2018-09-03 RX ORDER — POLYETHYLENE GLYCOL 3350 17 G/17G
17 POWDER, FOR SOLUTION ORAL 2 TIMES DAILY PRN
Status: DISCONTINUED | OUTPATIENT
Start: 2018-09-03 | End: 2018-09-07 | Stop reason: HOSPADM

## 2018-09-03 RX ORDER — ALENDRONATE SODIUM 70 MG/1
70 TABLET ORAL
Status: DISCONTINUED | OUTPATIENT
Start: 2018-09-03 | End: 2018-09-03

## 2018-09-03 RX ORDER — PREGABALIN 75 MG/1
150 CAPSULE ORAL 3 TIMES DAILY
Status: DISCONTINUED | OUTPATIENT
Start: 2018-09-03 | End: 2018-09-07 | Stop reason: HOSPADM

## 2018-09-03 RX ORDER — NALOXONE HYDROCHLORIDE 0.4 MG/ML
.1-.4 INJECTION, SOLUTION INTRAMUSCULAR; INTRAVENOUS; SUBCUTANEOUS
Status: DISCONTINUED | OUTPATIENT
Start: 2018-09-03 | End: 2018-09-07 | Stop reason: HOSPADM

## 2018-09-03 RX ORDER — PANTOPRAZOLE SODIUM 40 MG/1
40 TABLET, DELAYED RELEASE ORAL 2 TIMES DAILY
Status: DISCONTINUED | OUTPATIENT
Start: 2018-09-03 | End: 2018-09-07 | Stop reason: HOSPADM

## 2018-09-03 RX ORDER — HYDROCORTISONE 10 MG/1
10 TABLET ORAL 2 TIMES DAILY
Status: DISCONTINUED | OUTPATIENT
Start: 2018-09-03 | End: 2018-09-07 | Stop reason: HOSPADM

## 2018-09-03 RX ORDER — POTASSIUM CHLORIDE 7.45 MG/ML
10 INJECTION INTRAVENOUS
Status: DISCONTINUED | OUTPATIENT
Start: 2018-09-03 | End: 2018-09-07 | Stop reason: HOSPADM

## 2018-09-03 RX ORDER — PROCHLORPERAZINE 25 MG
25 SUPPOSITORY, RECTAL RECTAL EVERY 12 HOURS PRN
Status: DISCONTINUED | OUTPATIENT
Start: 2018-09-03 | End: 2018-09-07 | Stop reason: HOSPADM

## 2018-09-03 RX ORDER — DEXTROSE MONOHYDRATE 25 G/50ML
25-50 INJECTION, SOLUTION INTRAVENOUS
Status: DISCONTINUED | OUTPATIENT
Start: 2018-09-03 | End: 2018-09-07 | Stop reason: HOSPADM

## 2018-09-03 RX ORDER — POTASSIUM CHLORIDE 750 MG/1
20 TABLET, EXTENDED RELEASE ORAL DAILY
Status: DISCONTINUED | OUTPATIENT
Start: 2018-09-03 | End: 2018-09-07 | Stop reason: HOSPADM

## 2018-09-03 RX ORDER — HYDROCORTISONE 5 MG/1
10 TABLET ORAL ONCE
Status: COMPLETED | OUTPATIENT
Start: 2018-09-03 | End: 2018-09-03

## 2018-09-03 RX ORDER — ONDANSETRON 2 MG/ML
4 INJECTION INTRAMUSCULAR; INTRAVENOUS EVERY 6 HOURS PRN
Status: DISCONTINUED | OUTPATIENT
Start: 2018-09-03 | End: 2018-09-07 | Stop reason: HOSPADM

## 2018-09-03 RX ORDER — DULOXETIN HYDROCHLORIDE 30 MG/1
30 CAPSULE, DELAYED RELEASE ORAL DAILY
Status: DISCONTINUED | OUTPATIENT
Start: 2018-09-03 | End: 2018-09-07 | Stop reason: HOSPADM

## 2018-09-03 RX ORDER — DEXTROSE MONOHYDRATE 25 G/50ML
INJECTION, SOLUTION INTRAVENOUS
Status: COMPLETED
Start: 2018-09-03 | End: 2018-09-03

## 2018-09-03 RX ORDER — SUMATRIPTAN 50 MG/1
50 TABLET, FILM COATED ORAL
Status: DISCONTINUED | OUTPATIENT
Start: 2018-09-03 | End: 2018-09-07 | Stop reason: HOSPADM

## 2018-09-03 RX ORDER — LIDOCAINE 40 MG/G
CREAM TOPICAL
Status: DISCONTINUED | OUTPATIENT
Start: 2018-09-03 | End: 2018-09-07 | Stop reason: HOSPADM

## 2018-09-03 RX ORDER — DEXTROSE MONOHYDRATE 25 G/50ML
25-50 INJECTION, SOLUTION INTRAVENOUS
Status: DISCONTINUED | OUTPATIENT
Start: 2018-09-03 | End: 2018-09-04

## 2018-09-03 RX ORDER — ASPIRIN 81 MG/1
81 TABLET, CHEWABLE ORAL DAILY
Status: DISCONTINUED | OUTPATIENT
Start: 2018-09-03 | End: 2018-09-07 | Stop reason: HOSPADM

## 2018-09-03 RX ORDER — LEVOTHYROXINE SODIUM 112 UG/1
112 TABLET ORAL DAILY
Status: DISCONTINUED | OUTPATIENT
Start: 2018-09-03 | End: 2018-09-07 | Stop reason: HOSPADM

## 2018-09-03 RX ADMIN — DEXTROSE MONOHYDRATE 25 ML: 500 INJECTION PARENTERAL at 15:26

## 2018-09-03 RX ADMIN — DULOXETINE HYDROCHLORIDE 30 MG: 30 CAPSULE, DELAYED RELEASE ORAL at 19:52

## 2018-09-03 RX ADMIN — PANCRELIPASE 96000 UNITS: 60000; 12000; 38000 CAPSULE, DELAYED RELEASE PELLETS ORAL at 18:50

## 2018-09-03 RX ADMIN — CEFTRIAXONE 1 G: 1 INJECTION, POWDER, FOR SOLUTION INTRAMUSCULAR; INTRAVENOUS at 18:49

## 2018-09-03 RX ADMIN — DEXTROSE MONOHYDRATE 25 ML: 500 INJECTION PARENTERAL at 10:48

## 2018-09-03 RX ADMIN — DEXTROSE MONOHYDRATE 50 ML: 500 INJECTION PARENTERAL at 18:04

## 2018-09-03 RX ADMIN — PANTOPRAZOLE SODIUM 40 MG: 40 TABLET, DELAYED RELEASE ORAL at 19:45

## 2018-09-03 RX ADMIN — LEVOTHYROXINE SODIUM 112 MCG: 112 TABLET ORAL at 15:59

## 2018-09-03 RX ADMIN — DULOXETINE HYDROCHLORIDE 60 MG: 60 CAPSULE, DELAYED RELEASE ORAL at 19:44

## 2018-09-03 RX ADMIN — POTASSIUM CHLORIDE 10 MEQ: 10 INJECTION, SOLUTION INTRAVENOUS at 20:37

## 2018-09-03 RX ADMIN — SODIUM CHLORIDE 100 ML/HR: 234 INJECTION INTRAMUSCULAR; INTRAVENOUS; SUBCUTANEOUS at 23:00

## 2018-09-03 RX ADMIN — SODIUM CHLORIDE 75 ML/HR: 234 INJECTION INTRAMUSCULAR; INTRAVENOUS; SUBCUTANEOUS at 13:14

## 2018-09-03 RX ADMIN — ONDANSETRON 4 MG: 2 INJECTION INTRAMUSCULAR; INTRAVENOUS at 10:28

## 2018-09-03 RX ADMIN — DEXTROSE MONOHYDRATE 25 ML: 25 INJECTION, SOLUTION INTRAVENOUS at 13:19

## 2018-09-03 RX ADMIN — ONDANSETRON 4 MG: 4 TABLET, ORALLY DISINTEGRATING ORAL at 12:25

## 2018-09-03 RX ADMIN — NYSTATIN 100000 UNITS: 100000 SUSPENSION ORAL at 19:45

## 2018-09-03 RX ADMIN — SUMATRIPTAN SUCCINATE 50 MG: 50 TABLET ORAL at 20:52

## 2018-09-03 RX ADMIN — HYDROCORTISONE 10 MG: 5 TABLET ORAL at 17:36

## 2018-09-03 RX ADMIN — METOCLOPRAMIDE 5 MG: 5 TABLET ORAL at 15:59

## 2018-09-03 RX ADMIN — POTASSIUM CHLORIDE 10 MEQ: 10 INJECTION, SOLUTION INTRAVENOUS at 18:51

## 2018-09-03 RX ADMIN — ACETAMINOPHEN 500 MG: 500 TABLET, FILM COATED ORAL at 23:06

## 2018-09-03 RX ADMIN — PREGABALIN 150 MG: 75 CAPSULE ORAL at 19:45

## 2018-09-03 RX ADMIN — HYDROCORTISONE 10 MG: 10 TABLET ORAL at 19:43

## 2018-09-03 RX ADMIN — FUROSEMIDE 20 MG: 20 TABLET ORAL at 19:45

## 2018-09-03 RX ADMIN — DEXTROSE MONOHYDRATE 25 ML: 25 INJECTION, SOLUTION INTRAVENOUS at 16:47

## 2018-09-03 RX ADMIN — DICYCLOMINE HYDROCHLORIDE 10 MG: 10 CAPSULE ORAL at 19:45

## 2018-09-03 RX ADMIN — TOPIRAMATE 100 MG: 100 TABLET, FILM COATED ORAL at 19:44

## 2018-09-03 RX ADMIN — DICYCLOMINE HYDROCHLORIDE 10 MG: 10 CAPSULE ORAL at 15:59

## 2018-09-03 RX ADMIN — SUMATRIPTAN SUCCINATE 50 MG: 50 TABLET ORAL at 14:22

## 2018-09-03 RX ADMIN — PREGABALIN 150 MG: 75 CAPSULE ORAL at 15:59

## 2018-09-03 RX ADMIN — DEXTROSE MONOHYDRATE 25 ML: 25 INJECTION, SOLUTION INTRAVENOUS at 10:48

## 2018-09-03 ASSESSMENT — ACTIVITIES OF DAILY LIVING (ADL)
ADLS_ACUITY_SCORE: 10
ADLS_ACUITY_SCORE: 10

## 2018-09-03 ASSESSMENT — PAIN DESCRIPTION - DESCRIPTORS: DESCRIPTORS: ACHING

## 2018-09-03 NOTE — CONSULTS
Endocrinology Medical Student/ Resident / Fellow note    Chief complaint:  Chantell is a 52 year old female seen in consultation at the request of .      HISTORY OF PRESENT ILLNESS  Chantell Kidd is a 52 year old female with PMHx of chronic pancreatitis s/p TPIAT, nomi-en-Y, and splenectomy in 2012, IDDM on insulin pump, recurrent severe hypoglycemia, hypothyroidism, BRENDAN, prior GJ, and possible adrenal insufficiency who was recently admitted on 8/26/18 for severe hypoglycemia which was thought to be 2/2 exogenous insulin use, alcohol and poor po intake, she presented to the ED again on 9/3/2018 for hypoglycemia episode that led to LOC.     Patient follows with Dr. Maher in pediatric endo and last saw her in 05/2018. She has known hypoglycemic unawareness and difficulty with hypoglycemic episodes. See Dr. Maher's 05/2018 note for further details. She was on Dexcom CGM in the past and had lows 2-3 time with 40s the lowest on Dexcom.     Last hospitalization, patient presented with LOC with confirmed BG of 37. She had minimal responsive with glucagon. She was also found to have positive alcohol. Had abdominal pain and not been eating well. Work-up revealed c-peptide 0.3 with BG of 109 on 8/28/18. This suggests that her beta-cells are not that active, or presence of exogenous insulin. She was treated with D10 and prn D50. D10 was off later and she was able to tolerate PO diet well without lows. Patient was discharged without insulin pump and plan was to continue on Dexcom CGM. She was discharged on 8/29/2018.  called the patient on 8/30, her BGs were in 100-200, so insulin pump was resumed with reduced basal insulin rate as below:             12AM             0.4 units/hour (was 0.5)              7AM              0.5 units/hour (was 0.6)              No change to bolus settings    She reported that she was feeling well until yesterday morning. She woke up, not feeling well with abdominal pain, back pain  and right sided flank pain. No fever or chills or pain with urination. She said she passed out, her BG was 67, so insulin pump was disconnected in the morning of 9/2. She was given some liquids, pop, glucose tab with improvement, but 4 hours later her BG was low again in the 40s that she got the same treatment. This happened 2 times. She was tired and went to bed. This morning, per ED note, she was found unresponsive in bed by family member. EMS was called and found to have BG of 31, was treated with IM glucagon and PO glucose. She woke up en route.    At ED, she was awake and alert. BG was 123. She was started on D10W rate 75 ml/hr. Labs showed elevated wbc to 22, lactate 2.2 which normalized after IVF. UA positive leukocyte esterase with wbc 14. She was treated with Ceftriaxone empirically.     She continues to take hydrocortisone 10 mg bid. No new medications. She has not been wearing dexcom since discharged because of transmitter issue that couldn't pair to the machine if she stay in the other room. She said she already contacted dexcom tech for this. Denies alcohol drinking since previous hospitalization.       REVIEW OF SYSTEMS   ROS: 10 point ROS neg other than the symptoms noted above in the HPI.    Past Medical History  Past Medical History:   Diagnosis Date     Chronic abdominal pain      Chronic pancreatitis (H)     S/P pancreatectomy     Depression with anxiety      Diabetes mellitus (H) 1/2012     Gastro-oesophageal reflux disease      Hypothyroidism 4/23/2015     Kidney stones      Low serum cortisol level (H)      Migraines      Other chronic pain     STOMACH     Other chronic pain     LUMBAR SPINE     Spasm of sphincter of Oddi        Medications  Current Facility-Administered Medications   Medication     acetaminophen (TYLENOL) tablet 500 mg     alum & mag hydroxide-simethicone (MYLANTA/MAALOX) 200-200-25 MG chewable tablet 1 tablet     amylase-lipase-protease (CREON 12) 19776 units per capsule  72,000-96,000 Units     aspirin chewable tablet 81 mg     cefTRIAXone (ROCEPHIN) 1 g vial to attach to  mL bag for ADULTS or NS 50 mL bag for PEDS     cyclobenzaprine (FLEXERIL) tablet 10 mg     dextrose 10 % injection     dextrose 10% and 0.45% sodium chloride infusion (CMPD)     glucose gel 15-30 g    Or     dextrose 50 % injection 25-50 mL    Or     glucagon injection 1 mg     glucose gel 15-30 g    Or     dextrose 50 % injection 25-50 mL    Or     glucagon injection 1 mg     dicyclomine (BENTYL) capsule 10 mg     DULoxetine (CYMBALTA) EC capsule 30 mg     DULoxetine (CYMBALTA) EC capsule 60 mg     furosemide (LASIX) tablet 20 mg     glucagon kit 1 mg     hydrocortisone (CORTEF) tablet 10 mg     levothyroxine (SYNTHROID/LEVOTHROID) tablet 112 mcg     lidocaine (LMX4) cream     lidocaine 1 % 1 mL     [START ON 9/4/2018] linaclotide (LINZESS) capsule 145 mcg     melatonin tablet 1 mg     metoclopramide (REGLAN) tablet 5 mg     naloxone (NARCAN) injection 0.1-0.4 mg     nystatin (MYCOSTATIN) 607098 unit/mL suspension 100,000 Units     ondansetron (ZOFRAN-ODT) ODT tab 4 mg    Or     ondansetron (ZOFRAN) injection 4 mg     pantoprazole (PROTONIX) EC tablet 40 mg     polyethylene glycol (MIRALAX/GLYCOLAX) Packet 17 g     potassium chloride (KLOR-CON) Packet 20-40 mEq     potassium chloride 10 mEq in 100 mL intermittent infusion with 10 mg lidocaine     potassium chloride 10 mEq in 100 mL sterile water intermittent infusion (premix)     potassium chloride 10 mEq in 100 mL sterile water intermittent infusion (premix)     potassium chloride 20 mEq in 50 mL intermittent infusion     potassium chloride SA (K-DUR/KLOR-CON M) CR tablet 20 mEq     potassium chloride SA (K-DUR/KLOR-CON M) CR tablet 20-40 mEq     pregabalin (LYRICA) capsule 150 mg     prochlorperazine (COMPAZINE) injection 10 mg    Or     prochlorperazine (COMPAZINE) tablet 10 mg    Or     prochlorperazine (COMPAZINE) Suppository 25 mg     senna-docusate  (SENOKOT-S;PERICOLACE) 8.6-50 MG per tablet 2 tablet     sodium chloride (PF) 0.9% PF flush 3 mL     sodium chloride (PF) 0.9% PF flush 3 mL     SUMAtriptan (IMITREX) tablet 50 mg     topiramate (TOPAMAX) tablet 100 mg     traZODone (DESYREL) tablet 100 mg       No current outpatient prescriptions on file.       Allergies  Allergies   Allergen Reactions     Corticosteroids Other (See Comments)     All oral,IV and injectable steroids are contraindicated (unless in life threatening situations) in [facility] transplant recipients. They can cause irreversible loss of islet cell function. Please contact patients transplant care coordinator Malini Julien RN BSN @[phone]/Pager [phone]  and Endocrinologist prior to administration.     Chocolate Flavor Rash     Breaks out when eats chocolate         Family History  family history includes Cancer in her father, maternal grandfather, sister, and sister; Cardiovascular in her maternal grandmother; Diabetes in her maternal grandmother and mother; Hypertension in her mother; Osteoporosis in her mother.    Social History  Social History     Social History     Marital status:      Spouse name: N/A     Number of children: N/A     Years of education: N/A     Occupational History     Not on file.     Social History Main Topics     Smoking status: Never Smoker     Smokeless tobacco: Never Used     Alcohol use No     Drug use: No     Sexual activity: Yes     Other Topics Concern     Not on file     Social History Narrative     Physical Exam  /71 (BP Location: Right arm)  Temp 98.3  F (36.8  C) (Oral)  Resp 16  Wt 64.3 kg (141 lb 11.2 oz)  SpO2 98%  BMI 25.92 kg/m2  Body mass index is 25.92 kg/(m^2).  GENERAL :  In no apparent distress  SKIN: Normal color, normal temperature, texture.  No hirsutism, alopecia or purple striae.     EYES: PERRL, EOMI, No scleral icterus,  No proptosis, conjunctival redness, stare, retraction  MOUTH: Moist, pink; pharynx  clear  NECK: No visible masses. No palpable adenopathy, or masses.   THYROID:  Normal, nontender, smooth / firm texture,  no nodules  RESP: Lungs clear to auscultation bilaterally  CARDIAC: Regular rate and rhythm, normal S1 S2, without murmurs, rubs or gallops    ABDOMEN: soft, nontender, no HSM or masses       NEURO: awake, alert, responds appropriately to questions.  Cranial nerves intact.  Moves all extremities. No tremor of the outstretched hand.    EXTREMITIES: No clubbing, cyanosis or edema.    DATA REVIEW    Recent Labs  Lab 09/03/18  1948 09/03/18  1858 09/03/18  1841 09/03/18  1825 09/03/18  1759 09/03/18  1741 09/03/18  1718  09/03/18  0855  08/29/18  0521  08/28/18  0647   GLC  --   --  132*  --   --   --   --   --  124*  --  102*  --  109*   * 118*  --  139* 79 92 104*  < >  --   < >  --   < >  --    < > = values in this interval not displayed.        ASSESSMENT/PLAN:     Chantell Kidd is a 52 year old female with PMHx of chronic pancreatitis s/p TPIAT, nomi-en-Y, and splenectomy in 2012, IDDM on insulin pump, recurrent severe hypoglycemia, hypothyroidism, BRENDAN, prior GJ, and possible adrenal insufficiency who was recently admitted on 8/26/18 for severe hypoglycemia which thought to be 2/2 exogenous insulin use, alcohol and poor po intake, she presented to the ED again on 9/3/2018 for hypoglycemia episode that led to LOC.     # Severe, recurrent hypoglycemia  This episode started after she was placed on the pump. However, she disconnected the pump since 9/2 AM, the novolog effect should wears off by 4-6 hours, but she continued to have recurrent low episodes, this is very unusual. Unclear trigger this time. Could be 2/2 infection in setting of pyuria, elevated wbc, lactate, poor po intake. It would be good to know the pump setting and insulin type that she put in the pump (she reports using Novolog). As well as Dexcom data even she was not wearing it during the episode, at least to know her BG  pattern. Will have to re-evaluate beta-cell function as well as exogenous insulin in her system. Also not sure if she took sulfonylurea or not. Would like to get more information about her living situation and home environment as well.       # IDDM on home insulin pump  Patient with hx of TPIAT with reported beta-cell function, but she is, at times, reliant on daily insulin. She has detectable c-peptide levels in 2/2018. Follows with Dr. Maher in clinic. However, patient's c-peptide of 0.3 on 8/28/18 with a BG of 109 is interesting. This suggests that her beta-cells are not that active, and in context of continued hypoglycemia, could indicate exogenous insulin. A limitation is that this was a random draw (not stimulated and not during hypoglycemia).      # Possible secondary adrenal insufficiency  Patient originally diagnosed in 03/2016 with possible opioid-related secondary AI, however this diagnosis is questioned by her primary endocrinologist. She has remained on 10mg hydrocortisone BID since 2016.    Recommendations:  - Continue D10W for tonight. Plan to stop tomorrow morning  - Will check plasma glucose, c-peptide, insulin level to r/o exogenous insulin use  - Will check sulfonylurea screen  - BG q4h  - Hypoglycemia protocol -- start treatment if BG <60 or symptomatic  - Will check her insulin pump and CGM data. The patient said her son can bring the devices in tomorrow  - Continue hydrocortisone 10 mg bid (primary team already gave 10 mg extra today--that's ok.)    Endocrine will continue to follow.    Larissa Mckinley MD  Endocrine fellow  [phone]      Endocrine Staff Note    The patient was seen and examined by me with Dr. Mckinley. Her note details our mutual findings and plan.    Unclear reason for recurrent hypoglycemia. This admission the episode seems less clearly linked with use of her insulin pump. Initial workup as above. Our team will continue to follow.     Marlena Parikh MD  Endocrinology and  Diabetes   [phone]

## 2018-09-03 NOTE — ED PROVIDER NOTES
History     Chief Complaint   Patient presents with     Hypoglycemia     Dizziness     HPI  Chantell Kidd is a 52 year old female multiple medical problems including chronic abdominal pain, chronic pancreatitis status post pancreatectomy complicated by type 1 diabetes.     Recently hospitalized for hypoglycemic episodes as well as hypokalemia related to nutritional deficiencies, likely exacerbated by poor p.o. intake as well as chronic abdominal pain.  During this hospitalization patient had been seen by endocrinology and ultimately a plan for improved glycemic control and prevention of her frequent hypoglycemic episodes was in place.  He was initially discharged off-pump and then over the weekend insulin pump was reinstituted.    Her chronic abdominal pain was also dressed.  CT abdomen had been performed showing gastritis but no other emergent process.    Since her discharge patient reports continuing to feel somewhat weak and having difficulty taking adequate p.o. intake on account of her chronic abdominal pain.  Her pain is primarily in the upper abdomen radiating to her back, this character pain is unchanged.    She denies any drug or alcohol use since discharge.  She had used alcohol previously which was thought to play a part exacerbation of her pain and symptoms.    This morning the patient was found unresponsive by her family in bed.  She was last well last night.  On arrival by EMS identified to be hypoglycemic down to 31 and treated with IM glucagon due to failed IV access and subsequently oral glucose methods.    At the bedside now patient is alert and oriented.  She has no recollection to events prior to 11 PM last night.  She remembers is awaking with EMS en route.     She notes continued abdominal pain but otherwise denies chest pain shortness of breath or fever.  Describes feeling quite weak and is anorexic.      I have reviewed the Medications, Allergies, Past Medical and Surgical History, and Social  History in the Epic system.    Review of Systems   14 point review of symptoms was performed and is negative except as noted above.     Physical Exam   BP: 124/76  Pulse: 69  Heart Rate: 77  Temp: 97.6  F (36.4  C)  Resp: 16  Weight: 64.3 kg (141 lb 11.2 oz)  SpO2: 100 %      Physical Exam  GEN: Well appearing, non toxic, cooperative and conversant.   HEENT: The head is normocephalic and atraumatic. Pupils are equal round and reactive to light. Extraocular motions are intact. There is no facial swelling. The neck is nontender and supple.   CV: Regular rate and rhythm without murmurs rubs or gallops. 2+ radial pulses bilaterally.  PULM: Clear to auscultation bilaterally.  ABD: Soft, nontender, nondistended.   EXT: Full range of motion.  No edema.  NEURO: Cranial nerves II through XII are intact and symmetric. Bilateral upper and lower extremities grossly show full range of motion without any focal deficits.   SKIN: No rashes, ecchymosis, or lacerations  PSYCH: Calm and cooperative, interactive.     ED Course     ED Course     Procedures               Labs Ordered and Resulted from Time of ED Arrival Up to the Time of Departure from the ED   CBC WITH PLATELETS DIFFERENTIAL - Abnormal; Notable for the following:        Result Value    WBC 22.8 (*)     Absolute Neutrophil 20.8 (*)     All other components within normal limits   BASIC METABOLIC PANEL - Abnormal; Notable for the following:     Potassium 3.3 (*)     Glucose 124 (*)     All other components within normal limits   LACTIC ACID WHOLE BLOOD - Abnormal; Notable for the following:     Lactic Acid 2.2 (*)     All other components within normal limits   ROUTINE UA WITH MICROSCOPIC REFLEX TO CULTURE - Abnormal; Notable for the following:     Glucose Urine >1000 (*)     Leukocyte Esterase Urine Large (*)     WBC Urine 14 (*)     RBC Urine 4 (*)     Squamous Epithelial /HPF Urine 2 (*)     Mucous Urine Present (*)     All other components within normal limits    GLUCOSE BY METER - Abnormal; Notable for the following:     Glucose 123 (*)     All other components within normal limits   GLUCOSE BY METER - Abnormal; Notable for the following:     Glucose 125 (*)     All other components within normal limits   LIPASE - Abnormal; Notable for the following:     Lipase 35 (*)     All other components within normal limits   GLUCOSE BY METER - Abnormal; Notable for the following:     Glucose 56 (*)     All other components within normal limits   GLUCOSE BY METER - Abnormal; Notable for the following:     Glucose 43 (*)     All other components within normal limits   GLUCOSE BY METER - Abnormal; Notable for the following:     Glucose 115 (*)     All other components within normal limits   GLUCOSE MONITOR NURSING POCT   GLUCOSE MONITOR NURSING POCT   GLUCOSE BY METER   GLUCOSE BY METER   PERIPHERAL IV CATHETER            Assessments & Plan (with Medical Decision Making)   52-year-old female with hypoglycemic episode, recurring now after being recently discharged.  She was seen by endocrine as an inpatient, her pump rate was stopped and then restarted as an outpatient.  Since then however, it does not sound like she was able to keep up with her oral intake and has general poor appetite.  This is most likely the cause of hypoglycemia  Evidence of occult infection was not found on x-ray or urinalysis.    There is her ED stay, however, the patient has been well-appearing.  Conversant alert.  Tolerating p.o. to keep her sugars up.  Discussed with internal medicine and readmitted for further evaluation and workup of blood sugar control particularly hypoglycemic episodes.        I have reviewed the nursing notes.    I have reviewed the findings, diagnosis, plan and need for follow up with the patient.    Discharge Medication List as of 9/7/2018  5:51 PM      START taking these medications    Details   clotrimazole (MYCELEX) 10 MG LOZG lozenge Place 1 lozenge (1 Brenda) inside cheek 4 times  daily, Disp-70 each, R-0, E-Prescribe      diclofenac (FLECTOR) 1.3 % Patch Place 1 patch onto the skin 2 times daily, Disp-30 each, R-1, E-Prescribe      order for DME by [employer] [address] [place], [place]  Ph: [phone]  Fax: [phone]    Nutren 1.5 @ 10 ml/hr with advancement by 10 ml/hr q 8 hours to goal rate of 40 ml/hr.  This will provide 1440 kcals (27 kcal/kg/day), 65 g PRO (1.2 g /kg/day), 730 mL H2O, 169 g CHO and no Fiber daily.   Disp-1 Month, R-3, INES, Local Print             Final diagnoses:   Hypoglycemia       9/3/2018   [facility], [place], EMERGENCY DEPARTMENT     Juancho Villegas MD  09/08/18 0653

## 2018-09-03 NOTE — PHARMACY
"The following home medications were NOT continued on inpatient admission per \"Discontinuation of nonessential home medications during hospitalization\" policy: alendronate 70mg po weekly    If a therapeutic holiday is deemed inappropriate per the prescriber, please notify the pharmacist regarding the medication order.    The pharmacist is available to answer any questions and/or concerns the patient may have regarding discontinuation of non-essential medications.    Please ensure that these medications are restarted as needed upon discharge via the medication reconciliation discharge process and included on the discharge medication reconciliation report.    Thank you,  Alfredito Park, PharmD, BCPS    "

## 2018-09-03 NOTE — ED NOTES
BS re-check: 92. OK for pt to go upstairs with transport. Admitting MD at bedside, advised pt of admission.

## 2018-09-03 NOTE — PLAN OF CARE
Problem: Patient Care Overview  Goal: Plan of Care/Patient Progress Review  Outcome: No Change  Pt arrived to 7A, oriented to room and call light. VS and BG stable. Will continue to monitor.

## 2018-09-03 NOTE — ED NOTES
POCT Blood sugar: 56. ER MD notified. MD requesting juice and food be given to pt. Pt refusing as she is nauseated. Zofran ordered.

## 2018-09-03 NOTE — ED NOTES
[facility], [place]   ED Nurse to Floor Handoff     Chantell Kidd is a 52 year old female who speaks English and lives with family members,  in a home  They arrived in the ED by ambulance from home    ED Chief Complaint: Hypoglycemia and Dizziness    ED Dx;   Final diagnoses:   Hypoglycemia         Needed?: No    Allergies:   Allergies   Allergen Reactions     Corticosteroids Other (See Comments)     All oral,IV and injectable steroids are contraindicated (unless in life threatening situations) in [facility] [employer] transplant recipients. They can cause irreversible loss of islet cell function. Please contact patients transplant care coordinator Malini Julien RN BSN @[phone]/Pager [phone]  and Endocrinologist prior to administration.     Chocolate Flavor Rash     Breaks out when eats chocolate   .  Past Medical Hx:   Past Medical History:   Diagnosis Date     Chronic abdominal pain      Chronic pancreatitis (H)     S/P pancreatectomy     Depression with anxiety      Diabetes mellitus (H) 1/2012     Gastro-oesophageal reflux disease      Hypothyroidism 4/23/2015     Kidney stones      Low serum cortisol level (H)      Migraines      Other chronic pain     STOMACH     Other chronic pain     LUMBAR SPINE     Spasm of sphincter of Oddi       Baseline Mental status: WDL  Current Mental Status changes: at basesline    Infection present or suspected this encounter: no  Sepsis suspected: No  Isolation type: No active isolations     Activity level - Baseline/Home:  Independent  Activity Level - Current:   Independent    Bariatric equipment needed?: No    In the ED these meds were given:   Medications   ondansetron (ZOFRAN) injection 4 mg (4 mg Intravenous Given 9/3/18 1028)   dextrose 50 % injection (not administered)   glucose gel 15-30 g ( Oral See Alternative 9/3/18 1048)     Or   dextrose 50 % injection 25-50 mL (25 mLs Intravenous Given 9/3/18 1048)     Or   glucagon injection 1  mg ( Subcutaneous See Alternative 9/3/18 1048)       Drips running?  No    Home pump  Yes- pt normally wears insulin pump but took it off yesterday since she wasn't eating.    Current LDAs  Peripheral IV 09/03/18 Right;Lateral Lower forearm (Active)   Number of days:0       Gastrostomy/Enterostomy Gastrojejunostomy LUQ 1 16 fr (Active)   Number of days:369       Incision/Surgical Site 09/15/16 Bilateral Abdomen (Active)   Number of days:718       Labs results:   Labs Ordered and Resulted from Time of ED Arrival Up to the Time of Departure from the ED   CBC WITH PLATELETS DIFFERENTIAL - Abnormal; Notable for the following:        Result Value    WBC 22.8 (*)     Absolute Neutrophil 20.8 (*)     All other components within normal limits   BASIC METABOLIC PANEL - Abnormal; Notable for the following:     Potassium 3.3 (*)     Glucose 124 (*)     All other components within normal limits   LACTIC ACID WHOLE BLOOD - Abnormal; Notable for the following:     Lactic Acid 2.2 (*)     All other components within normal limits   ROUTINE UA WITH MICROSCOPIC REFLEX TO CULTURE - Abnormal; Notable for the following:     Glucose Urine >1000 (*)     Leukocyte Esterase Urine Large (*)     WBC Urine 14 (*)     RBC Urine 4 (*)     Squamous Epithelial /HPF Urine 2 (*)     Mucous Urine Present (*)     All other components within normal limits   GLUCOSE BY METER - Abnormal; Notable for the following:     Glucose 123 (*)     All other components within normal limits   GLUCOSE BY METER - Abnormal; Notable for the following:     Glucose 125 (*)     All other components within normal limits   LIPASE - Abnormal; Notable for the following:     Lipase 35 (*)     All other components within normal limits   GLUCOSE BY METER - Abnormal; Notable for the following:     Glucose 56 (*)     All other components within normal limits   GLUCOSE BY METER - Abnormal; Notable for the following:     Glucose 43 (*)     All other components within normal limits    GLUCOSE BY METER - Abnormal; Notable for the following:     Glucose 115 (*)     All other components within normal limits   GLUCOSE MONITOR NURSING POCT   GLUCOSE MONITOR NURSING POCT   GLUCOSE MONITOR NURSING POCT   PERIPHERAL IV CATHETER   URINE CULTURE AEROBIC BACTERIAL   URINE CULTURE AEROBIC BACTERIAL       Imaging Studies:   Recent Results (from the past 24 hour(s))   XR Chest Port 1 View    Narrative    Exam: XR CHEST PORT 1 VW, 9/3/2018 8:21 AM    Indication: hypoglycemia, eval for occult pna;     Comparison: 7/6/2016    Findings:   Single frontal view of the chest. The trachea is midline. The  cardiomediastinal silhouette is within normal limits. There is no  pneumothorax or pleural effusion. No focal pulmonary opacities.  Calcified granuloma. Cholecystectomy clips. Epigastric clips. The  visualized upper abdomen is otherwise unremarkable. No acute osseous  abnormality.      Impression    Impression:   No acute cardiopulmonary abnormality.    I have personally reviewed the examination and initial interpretation  and I agree with the findings.    ZENA BERNARDO MD       Recent vital signs:   /73  Temp 97.6  F (36.4  C) (Oral)  Resp 16  SpO2 100%    Code Status: Full Code    Pain control: pt had none    Nausea control: fair, zofran given 1028    Abnormal labs/tests/findings requiring intervention: see epic    Family present during ED course? No   Family Comments/Social Situation comments: N/A    Tasks needing completion: See epic. Pt having Q1hr BS checks    Yuliet Madsen, RN  [phone] [facility]

## 2018-09-03 NOTE — ED TRIAGE NOTES
"Pt BIBA for low blood sugar. Pt states she wasn't feeling well yesterday and didn't eat much due to abd and back pain. Had soup and toast. Pt states she was in the hospital last week for blood sugar issues but during her admission a \"stomach infection\" was found. Per pt, blood sugars yesterday were \"all over the place\" so she was trying to manage her blood sugar with gel and glucose tablets.    This morning  found pt unresponsive and family called 911. Pt does not remember anything this morning except waking up in the back of the ambulance.    When EMS arrived, pt's blood sugar was 31. EMS was unable to start and IV so pt was given IM glucagon. After glucagon, BS was 71. When pt woke, she took 4- 4g glucose tablets and blood sugar came up to 113. Pt was VSS for EMS.    Hx: Type 1 diabetic, no pancreas  Pt normally wears insulin pump, but isn't wearing it today. Last insulin was yesterday morning.    Triage   "

## 2018-09-03 NOTE — IP AVS SNAPSHOT
MRN:[id number]                      After Visit Summary   9/3/2018    Chantell Kidd    MRN: [id number]           Thank you!     Thank you for choosing [facility] for your care. Our goal is always to provide you with excellent care. Hearing back from our patients is one way we can continue to improve our services. Please take a few minutes to complete the written survey that you may receive in the mail after you visit with us. Thank you!        Patient Information     Date Of Birth          1965        Designated Caregiver       Most Recent Value    Caregiver    Will someone help with your care after discharge? yes    Name of designated caregiver Yannick     Phone number of caregiver [phone]    Caregiver address see facesheet      About your hospital stay     You were admitted on:  September 3, 2018 You last received care in the:  [address] [facility]    You were discharged on:  September 7, 2018        Reason for your hospital stay       You were hospitalized for low blood sugars. You were given sugar via IV. Your sugars were stable upon discharge. You were seen by endocrinology while you are in the hospital. Do not restart your pump on discharge. You had a feeding tube placed and will discharge on tube feeds.                  Who to Call     For medical emergencies, please call 911.  For non-urgent questions about your medical care, please call your primary care provider or clinic, [phone]          Attending Provider     Provider Specialty    Juancho Villegas MD Emergency Medicine    Waldemar Hanley MD Internal Medicine    Leonard Millard MD Internal Medicine       Primary Care Provider Office Phone # Fax #    Ron Morgan [phone]       When to contact your care team       Call your primary doctor if you have any of the following: temperature greater than 100.4 or less than 95 or increased pain.                  After Care Instructions     Activity        Your activity upon discharge: activity as tolerated            Diet       Follow this diet upon discharge: Orders Placed This Encounter      Calorie Counts      Snacks/Supplements Adult: Boost Breeze; Between Meals      Adult Formula Drip Feeding: Continuous Nutren 1.5; Nasoduodenal tube; Goal Rate: 40; mL/hr; Medication - Tube Feeding Flush Frequency: At least 15-30 mL water before and after medication administration and with tube clogging; Amount to Send (Nutri...      Combination Diet Regular Diet Adult            Tubes and drains       You are going home with the following tubes or drains: feeding tube [place]-Tube.   Tube cares per hospital or home care instructions                  Follow-up Appointments     Adult [facility] Follow-up and recommended labs and tests       Follow up with primary care provider, Ron Morgan, within 7 days to evaluate medication change and to evaluate treatment change.  The following labs/tests are recommended: Phos, BMP, monitor Tube Feeds.      Appointments on [place] and/or [facility] (with [facility] or [facility] provider or service). Call [phone] if you haven't heard regarding these appointments within 7 days of discharge.                  Your next 10 appointments already scheduled     Sep 17, 2018  8:15 AM CDT   (Arrive by 8:00 AM)   Return Visit with BEE Tolbert CNP   [facility] ([facility])    [address] [place] [id number] [phone]            Oct 04, 2018 10:20 AM CDT   (Arrive by 10:05 AM)   Return Visit with Ron Morgan MD   [facility] ([facility])    [address] [place] [id number] [phone]            Nov 15, 2018  9:20 AM CST   (Arrive by 9:05 AM)   Return [place] with Amena Maher MD   [facility] ([facility])    [address] [place] [address]  [address] [id number] [phone]              Further instructions from your care team       ________________________________________________________  Discharge RN please fax discharge orders to : [facility]  ________________________________________________________    Pending Results     Date and Time Order Name Status Description    9/3/2018 1757 Sulfonylurea Screen In process             Statement of Approval     Ordered          09/07/18 1721  I have reviewed and agree with all the recommendations and orders detailed in this document.  EFFECTIVE NOW     Approved and electronically signed by:  Sonia Severino MD             Admission Information     Date & Time Provider Department Dept. Phone    9/3/2018 Leonard Millard MD [address] [facility] [phone]      Your Vitals Were     Blood Pressure Pulse Temperature Respirations Weight Pulse Oximetry    110/73 (BP Location: Right arm) 82 98.2  F (36.8  C) (Oral) 16 64 kg (141 lb 3.2 oz) 98%    BMI (Body Mass Index)                   25.83 kg/m2           MyChart Information     [employer] gives you secure access to your electronic health record. If you see a primary care provider, you can also send messages to your care team and make appointments. If you have questions, please call your primary care clinic.  If you do not have a primary care provider, please call [phone] and they will assist you.        Care EveryWhere ID     This is your Care EveryWhere ID. This could be used by other organizations to access your [facility] medical records  [id number]        Equal Access to Services     BRIDGETTE JOHNSON : Arslan Shoemaker, genesis ng, bambi carr. [facility] [phone].    ATENCIÓN: Si habla español, tiene a webb disposición servicios gratuitos de asistencia lingüística. Llame al [phone].    We comply with applicable federal civil rights laws and [place] laws.  We do not discriminate on the basis of race, color, national origin, age, disability, sex, sexual orientation, or gender identity.               Review of your medicines      START taking        Dose / Directions    clotrimazole 10 MG Lozg lozenge   Commonly known as:  MYCELEX   Used for:  Thrush        Dose:  1 Brenda   Place 1 lozenge (1 Brenda) inside cheek 4 times daily   Quantity:  70 each   Refills:  0       diclofenac 1.3 % Patch   Commonly known as:  FLECTOR   Used for:  Generalized abdominal pain        Dose:  1 patch   Place 1 patch onto the skin 2 times daily   Quantity:  30 each   Refills:  1       order for DME   Used for:  Hypoglycemia, Nausea, Decreased oral intake, Exocrine pancreatic insufficiency, Type 1 diabetes mellitus with hypoglycemia and without coma (H), Generalized abdominal pain, Post-pancreatectomy diabetes (H), Cell transplant, On enteral nutrition        by [employer] [place], [place] Ph: [phone] Fax: [phone]  Nutren 1.5 @ 10 ml/hr with advancement by 10 ml/hr q 8 hours to goal rate of 40 ml/hr.  This will provide 1440 kcals (27 kcal/kg/day), 65 g PRO (1.2 g/kg/day), 730 mL H2O, 169 g CHO and no Fiber daily.   Quantity:  1 Month   Refills:  3         CONTINUE these medicines which have NOT CHANGED        Dose / Directions    acetaminophen 500 MG Caps        Take 1,000 mg by mouth three times a day as needed.   Refills:  0       alendronate 70 MG tablet   Commonly known as:  FOSAMAX   Used for:  Osteoporosis        Dose:  70 mg   Take 1 tablet (70 mg) by mouth every 7 days On Sundays take first thing in the morning with plain water and remain upright for at least 30 minutes and until after first food of the day  Do not restart Fosamax (alendronate) until your difficulty swallowing has resolved and you have finished the entire course of fluconazole (Diflucan).   Quantity:  4 tablet   Refills:  0       alum & mag hydroxide-simethicone 200-200-25 MG  Chew chewable tablet   Commonly known as:  MYLANTA/MAALOX        Dose:  1 tablet   Take 1 tablet by mouth 3 times daily as needed for indigestion   Refills:  0       amylase-lipase-protease 50936 units Cpep   Commonly known as:  CREON 12        Take 6 to 8 capsules by mouth with meals and take 4 capsules with snacks   Refills:  0       aspirin 81 MG tablet        Take 81 mg by mouth daily.   Refills:  0       blood glucose monitoring lancets   Used for:  Chronic abdominal pain        by Lancet route. Use to test blood sugar daily or as directed.   Quantity:  102 each   Refills:  prn       * blood glucose monitoring test strip   Commonly known as:  no brand specified   Used for:  Post-pancreatectomy diabetes (H)        Use to test blood glucoses 6-8 times per day.   Quantity:  240 each   Refills:  11       * blood glucose monitoring test strip   Commonly known as:  NOEMI CONTOUR NEXT   Used for:  Post-pancreatectomy diabetes (H)        Use to test blood sugar 8 times daily.   Quantity:  240 each   Refills:  11       BOOST HIGH PROTEIN Liqd   Used for:  Pancreatic insufficiency        Also can use Ensure clear (available over the counter)   Refills:  0       cyclobenzaprine 5 MG tablet   Commonly known as:  FLEXERIL        Dose:  10 mg   Take 10 mg by mouth 2 times daily as needed for muscle spasms   Refills:  0       diclofenac 1 % Gel topical gel   Commonly known as:  VOLTAREN        Dose:  2 g   2 g Apply 2 g to skin four times a day as needed (to affected upper extremity joint(s)). Maximum 8g/day per joint, 16g/day total.   Refills:  0       dicyclomine 10 MG capsule   Commonly known as:  BENTYL   Used for:  Abdominal pain, epigastric        TAKE ONE CAPSULE BY MOUTH EVERY 6 HOURS AS NEEDED   Quantity:  40 capsule   Refills:  3       dronabinol 2.5 MG capsule   Commonly known as:  MARINOL   Used for:  Routine health maintenance        Dose:  5 mg   Take 2 capsules (5 mg) by mouth 2 times daily as needed    Quantity:  56 capsule   Refills:  0       * DULoxetine 30 MG EC capsule   Commonly known as:  CYMBALTA   Used for:  Major depressive disorder, recurrent episode, moderate (H), CIERRA (generalized anxiety disorder)        Dose:  30 mg   Take 1 capsule (30 mg) by mouth daily With 60mg capsule for total dose of 90mg   Quantity:  90 capsule   Refills:  1       * DULoxetine 60 MG EC capsule   Commonly known as:  CYMBALTA   Used for:  Major depressive disorder, recurrent episode, moderate (H), CIERRA (generalized anxiety disorder)        Dose:  60 mg   Take 1 capsule (60 mg) by mouth daily With 30mg capsule for total dose of 90mg   Quantity:  90 capsule   Refills:  1       fluconazole 200 MG tablet   Commonly known as:  DIFLUCAN        Take 2 tabs the first day and 1 tab for the next 13 days   Quantity:  15 tablet   Refills:  0       furosemide 20 MG tablet   Commonly known as:  LASIX   Used for:  Edema, unspecified type        Dose:  20 mg   Take 1 tablet (20 mg) by mouth 2 times daily   Quantity:  180 tablet   Refills:  2       glucagon 1 MG kit   Commonly known as:  GLUCAGON EMERGENCY   Used for:  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 Watch for hypoglycemia recurrence.   Quantity:  60 tablet   Refills:  3       insulin pen needle 31G X 5 MM   Used for:  Chronic abdominal pain        RX# [id number]  Pen Needle UC 31G UF IV Mini  Use 4-8 needles per day for insulin injections.   Quantity:  2 Box   Refills:  11       levothyroxine 112 MCG tablet   Commonly known as:  SYNTHROID/LEVOTHROID   Used for:  Hypothyroidism        Dose:  112 mcg   Take 1 tablet (112 mcg) by mouth daily   Quantity:  30  tablet   Refills:  0       linaclotide 145 MCG capsule   Commonly known as:  LINZESS   Used for:  Constipation, unspecified constipation type, Chronic back pain, unspecified back location, unspecified back pain laterality, Physical deconditioning, Primary insomnia        Dose:  145 mcg   Take 1 capsule (145 mcg) by mouth every morning (before breakfast)   Quantity:  30 capsule   Refills:  0       metoclopramide 5 MG tablet   Commonly known as:  REGLAN        Dose:  5 mg   Take 5 mg by mouth 2 times daily as needed   Refills:  0       nystatin 260100 unit/mL Susp suspension   Commonly known as:  MYCOSTATIN   Used for:  Odynophagia        Dose:  882284 Units   Take 1 mL (100,000 Units) by mouth 4 times daily   Quantity:  60 mL   Refills:  1       ondansetron 4 MG ODT tab   Commonly known as:  ZOFRAN-ODT   Used for:  Nausea        DISSOLVE ONE TABLET ON THE TONGUE EVERY 6 HOURS AS NEEDED FOR NAUSEA AND VOMITING   Quantity:  60 tablet   Refills:  2       pantoprazole 40 MG EC tablet   Commonly known as:  PROTONIX   Used for:  H. pylori infection        Dose:  40 mg   Take 1 tablet (40 mg) by mouth 2 times daily   Quantity:  180 tablet   Refills:  3       polyethylene glycol Packet   Commonly known as:  MIRALAX/GLYCOLAX   Used for:  Chronic constipation        Dose:  1 packet   Take 1 packet by mouth 2 times daily as needed for constipation   Quantity:  14 each   Refills:  5       potassium chloride SA 20 MEQ CR tablet   Commonly known as:  K-DUR/KLOR-CON M   Used for:  Hypokalemia        Dose:  20 mEq   Take 1 tablet (20 mEq) by mouth daily   Quantity:  90 tablet   Refills:  1       pregabalin 150 MG capsule   Commonly known as:  LYRICA   Used for:  Chronic generalized abdominal pain        Dose:  150 mg   Take 1 capsule (150 mg) by mouth 3 times daily   Quantity:  90 capsule   Refills:  5       senna-docusate 8.6-50 MG per tablet   Commonly known as:  SENOKOT-S;PERICOLACE        Dose:  2 tablet   Take 2 tablets by  mouth daily   Refills:  0       SUMAtriptan 50 MG tablet   Commonly known as:  IMITREX   Used for:  Migraine        Dose:  50 mg   Take 1 tablet (50 mg) by mouth at onset of headache for migraine Take 1 Tab by mouth Once as needed for Migraine Headache. May repeat after two hours.  Maximum dose 200 mg/24 hours.   Quantity:  30 tablet   Refills:  1       topiramate 100 MG tablet   Commonly known as:  TOPAMAX   Used for:  Migraine, unspecified, not intractable, without status migrainosus        Dose:  100 mg   Take 1 tablet (100 mg) by mouth 2 times daily   Quantity:  180 tablet   Refills:  1       traZODone 100 MG tablet   Commonly known as:  DESYREL   Used for:  Primary insomnia, Constipation, unspecified constipation type, Chronic back pain, unspecified back location, unspecified back pain laterality, Physical deconditioning        TAKE 1-2 TABLETS BY MOUTH AN HOUR BEFORE BEDTIME   Quantity:  100 tablet   Refills:  1       * Notice:  This list has 4 medication(s) that are the same as other medications prescribed for you. Read the directions carefully, and ask your doctor or other care provider to review them with you.         Where to get your medicines      These medications were sent to [id number] - [place], [place] - [address]  [address] [facility] [address]    Hours:  test Rx sent successfully 12/26/02   Phone:  [phone]     clotrimazole 10 MG Lozg lozenge    diclofenac 1.3 % Patch         Some of these will need a paper prescription and others can be bought over the counter. Ask your nurse if you have questions.     Bring a paper prescription for each of these medications     order for DME                Protect others around you: Learn how to safely use, store and throw away your medicines at www.disposemymeds.org.             Medication List: This is a list of all your medications and when to take them. Check marks below indicate your daily home schedule. Keep this list as a  reference.      Medications           Morning Afternoon Evening Bedtime As Needed    acetaminophen 500 MG Caps   Take 1,000 mg by mouth three times a day as needed.                                alendronate 70 MG tablet   Commonly known as:  FOSAMAX   Take 1 tablet (70 mg) by mouth every 7 days On Sundays take first thing in the morning with plain water and remain upright for at least 30 minutes and until after first food of the day  Do not restart Fosamax (alendronate) until your difficulty swallowing has resolved and you have finished the entire course of fluconazole (Diflucan).                                alum & mag hydroxide-simethicone 200-200-25 MG Chew chewable tablet   Commonly known as:  MYLANTA/MAALOX   Take 1 tablet by mouth 3 times daily as needed for indigestion                                amylase-lipase-protease 67892 units Cpep   Commonly known as:  CREON 12   Take 6 to 8 capsules by mouth with meals and take 4 capsules with snacks   Last time this was given:  2 capsules on 9/7/2018  4:08 PM                                aspirin 81 MG tablet   Take 81 mg by mouth daily.                                blood glucose monitoring lancets   by Lancet route. Use to test blood sugar daily or as directed.                                * blood glucose monitoring test strip   Commonly known as:  no brand specified   Use to test blood glucoses 6-8 times per day.                                * blood glucose monitoring test strip   Commonly known as:  NOEMI CONTOUR NEXT   Use to test blood sugar 8 times daily.                                BOOST HIGH PROTEIN Liqd   Also can use Ensure clear (available over the counter)                                clotrimazole 10 MG Lozg lozenge   Commonly known as:  MYCELEX   Place 1 lozenge (1 Brenda) inside cheek 4 times daily   Last time this was given:  1 Brenda on 9/7/2018  4:08 PM                                cyclobenzaprine 5 MG tablet   Commonly known as:   FLEXERIL   Take 10 mg by mouth 2 times daily as needed for muscle spasms   Last time this was given:  10 mg on 9/6/2018  2:45 PM                                diclofenac 1 % Gel topical gel   Commonly known as:  VOLTAREN   2 g Apply 2 g to skin four times a day as needed (to affected upper extremity joint(s)). Maximum 8g/day per joint, 16g/day total.                                diclofenac 1.3 % Patch   Commonly known as:  FLECTOR   Place 1 patch onto the skin 2 times daily   Last time this was given:  1 patch on 9/6/2018  2:41 PM                                dicyclomine 10 MG capsule   Commonly known as:  BENTYL   TAKE ONE CAPSULE BY MOUTH EVERY 6 HOURS AS NEEDED   Last time this was given:  10 mg on 9/7/2018  4:07 PM                                dronabinol 2.5 MG capsule   Commonly known as:  MARINOL   Take 2 capsules (5 mg) by mouth 2 times daily as needed                                * DULoxetine 30 MG EC capsule   Commonly known as:  CYMBALTA   Take 1 capsule (30 mg) by mouth daily With 60mg capsule for total dose of 90mg   Last time this was given:  30 mg on 9/7/2018  8:18 AM                                * DULoxetine 60 MG EC capsule   Commonly known as:  CYMBALTA   Take 1 capsule (60 mg) by mouth daily With 30mg capsule for total dose of 90mg   Last time this was given:  30 mg on 9/7/2018  8:18 AM                                fluconazole 200 MG tablet   Commonly known as:  DIFLUCAN   Take 2 tabs the first day and 1 tab for the next 13 days                                furosemide 20 MG tablet   Commonly known as:  LASIX   Take 1 tablet (20 mg) by mouth 2 times daily   Last time this was given:  20 mg on 9/7/2018  8:20 AM                                glucagon 1 MG kit   Commonly known as:  GLUCAGON EMERGENCY   Inject 1 mg into the muscle once for 1 dose                                hydrocortisone 10 MG tablet   Commonly known as:  CORTEF   Take 10 mg in the morning and 10 mg at bedtime. Watch  for hypoglycemia recurrence.   Last time this was given:  10 mg on 9/7/2018  8:16 AM                                insulin pen needle 31G X 5 MM   RX# [id number]  Pen Needle UC 31G UF IV Mini  Use 4-8 needles per day for insulin injections.                                levothyroxine 112 MCG tablet   Commonly known as:  SYNTHROID/LEVOTHROID   Take 1 tablet (112 mcg) by mouth daily   Last time this was given:  112 mcg on 9/7/2018  8:19 AM                                linaclotide 145 MCG capsule   Commonly known as:  LINZESS   Take 1 capsule (145 mcg) by mouth every morning (before breakfast)   Last time this was given:  145 mcg on 9/7/2018  8:20 AM                                metoclopramide 5 MG tablet   Commonly known as:  REGLAN   Take 5 mg by mouth 2 times daily as needed   Last time this was given:  5 mg on 9/5/2018  3:58 PM                                nystatin 011894 unit/mL Susp suspension   Commonly known as:  MYCOSTATIN   Take 1 mL (100,000 Units) by mouth 4 times daily   Last time this was given:  100,000 Units on 9/6/2018  8:49 AM                                ondansetron 4 MG ODT tab   Commonly known as:  ZOFRAN-ODT   DISSOLVE ONE TABLET ON THE TONGUE EVERY 6 HOURS AS NEEDED FOR NAUSEA AND VOMITING   Last time this was given:  4 mg on 9/4/2018  8:31 AM                                order for DME   by [address] [place], [place] Ph: [phone] Fax: [phone]  Nutren 1.5 @ 10 ml/hr with advancement by 10 ml/hr q 8 hours to goal rate of 40 ml/hr.  This will provide 1440 kcals (27 kcal/kg/day), 65 g PRO (1.2 g/kg/day), 730 mL H2O, 169 g CHO and no Fiber daily.                                pantoprazole 40 MG EC tablet   Commonly known as:  PROTONIX   Take 1 tablet (40 mg) by mouth 2 times daily   Last time this was given:  40 mg on 9/7/2018  8:18 AM                                polyethylene glycol Packet   Commonly known as:  MIRALAX/GLYCOLAX   Take 1 packet by  mouth 2 times daily as needed for constipation                                potassium chloride SA 20 MEQ CR tablet   Commonly known as:  K-DUR/KLOR-CON M   Take 1 tablet (20 mEq) by mouth daily                                pregabalin 150 MG capsule   Commonly known as:  LYRICA   Take 1 capsule (150 mg) by mouth 3 times daily   Last time this was given:  150 mg on 9/7/2018  2:00 PM                                senna-docusate 8.6-50 MG per tablet   Commonly known as:  SENOKOT-S;PERICOLACE   Take 2 tablets by mouth daily   Last time this was given:  2 tablets on 9/7/2018  8:19 AM                                SUMAtriptan 50 MG tablet   Commonly known as:  IMITREX   Take 1 tablet (50 mg) by mouth at onset of headache for migraine Take 1 Tab by mouth Once as needed for Migraine Headache. May repeat after two hours.  Maximum dose 200 mg/24 hours.   Last time this was given:  50 mg on 9/3/2018  8:52 PM                                topiramate 100 MG tablet   Commonly known as:  TOPAMAX   Take 1 tablet (100 mg) by mouth 2 times daily   Last time this was given:  100 mg on 9/7/2018  8:18 AM                                traZODone 100 MG tablet   Commonly known as:  DESYREL   TAKE 1-2 TABLETS BY MOUTH AN HOUR BEFORE BEDTIME   Last time this was given:  100 mg on 9/5/2018 12:17 AM                                * Notice:  This list has 4 medication(s) that are the same as other medications prescribed for you. Read the directions carefully, and ask your doctor or other care provider to review them with you.

## 2018-09-03 NOTE — ED NOTES
Pt BS: 85. ERT at bedside encouraging orange juice as pt refusing to eat.  Will re-check in 15min.

## 2018-09-03 NOTE — ED NOTES
POCT Blood Sugar: 43. ERT Sav[place] at bedside with pt. Pt denies sx. RN placed standing order for D50. To give 25ml per protocol. ER MD notified.

## 2018-09-03 NOTE — PLAN OF CARE
Problem: Patient Care Overview  Goal: Plan of Care/Patient Progress Review  Outcome: No Change  Chantell Kidd continues to have hypoglycemia despite starting D10 drip and giving 50 ml of D10.  Dr. Severino notified. BG now stable, endocrine to consult with patient this afternoon.

## 2018-09-03 NOTE — IP AVS SNAPSHOT
[address] [address]    Phone:  [phone]                                       After Visit Summary   9/3/2018    Chantell Kidd    MRN: [id number]           After Visit Summary Signature Page     I have received my discharge instructions, and my questions have been answered. I have discussed any challenges I see with this plan with the nurse or doctor.    ..........................................................................................................................................  Patient/Patient Representative Signature      ..........................................................................................................................................  Patient Representative Print Name and Relationship to Patient    ..................................................               ................................................  Date                                            Time    ..........................................................................................................................................  Reviewed by Signature/Title    ...................................................              ..............................................  Date                                                            Time          22EPIC Rev 08/18

## 2018-09-03 NOTE — H&P
[facility] [place]    Internal Medicine History and Physical - [facility] Service       Date of Admission:  9/3/2018    Assessment & Plan   Chantell Kidd is a 52 year old female admitted on 9/3/2018. She has a history of history of chronic pancreatitis s/p pancreatectomy with auto islet transplantation, resultant T1DM, chronic abd pain, GERD, anxiety depression and is admitted for hypoglycemia.     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 Recently needed steroids increased to 20mg BID to keep sugars up.    - Endocrine consult, appreciate assistance   - Hypoglycemia protocol   - D10 1/2NS gtt    - Continue 10mg BID hydrocortisone   - D50 PRN   - Continue PTA creon    # Abdominal Pain/Nausea  # Headache  CT ab/pel 8/26 showing mild gastroenteritis per read. Pain at baseline per patient.    - Zofran PRN   - Encourage PO intake to maintain sugars   - Tylenol PRN    # Lactic acidosis  # Leukocytosis   # Possible UTI  2.2 on admission --> 0.9 with fluids. WBC 22 on admission. Likely secondary to dehydration and hypoglycemia along with inflammatory response to hypoglycemia. No other sign of systemic infection. No antibiotics at this point, will closely monitor. Low threshold to rescan abdomen due to continued pain and leukocytosis. UA with large LE, no nitrites and few WBC - combined with abdominal pain/flank pain.    - IV ceftriaxone x1       Chronic Medical Problems    - CIERRA: PTA cymbalta   - Hypothyroid: PTA levothyroxine   - Adrenal Insufficiency: continue pta hydrocortisone   - Chronic Pain: continue pta tylenol, simethicone, dicyclomine     # Pain Assessment:  Current Pain Score 8/29/2018   Patient currently in pain? yes   Pain score (0-10) -   Pain location Abdomen   Pain descriptors Aching   - Chantell is experiencing pain due to chronic abdominal pain. Pain management was discussed and the plan was created  "in a collaborative fashion.  Chantell's response to the current recommendations: engaged  - Please see the plan for pain management as documented above    Diet:  Regular diet  Fluids: D5 1/2 NS   DVT Prophylaxis: Low Risk/Ambulatory with no VTE prophylaxis indicated  Code Status: Full Code    Disposition Plan   Expected discharge: Tomorrow; recommended to prior living arrangement once blood sugar stable.     Entered: Sonia Severino 09/03/2018, 12:51 PM   Information in the above section will display in the discharge planner report.    The patient was discussed with Dr. Dayan Severino MD  [address]   Pager: [phone]  Please see sticky note for cross cover information    Chief Complaint   Hypoglycemia    History is obtained from the patient and medical record    History of Present Illness   Chantell Kidd is a 52 year old female  who has a history of history of chronic pancreatitis s/p pancreatectomy with auto islet transplantation, resultant T1DM, chronic abd pain, GERD, anxiety depression and is admitted for hypoglycemia. She was recently admitted from 8/26-8/29 for hypoglycemia requiring a D10 drip during hospitalization. She had restarted her insulin drip per endocrine recs on 8/31 and was maintaining well. During her previous hospitalization she was dealing with some nausea without vomiting likely from gastroenteritis - she states she is still having difficulty with oral intake due to nausea, still without vomiting. The day prior to admission she states she ate half a sandwich and a cup of tomato soup. The evening prior to admission she was feeling \"bad\" and was \"off\" so she kept using her glucose tabs and juice - she states her son made her toast at about 11pm because she was acting \"off.\" In the AM she had an episode where she get very sweaty and confused, she does not remember the episode very well - her  was present and got her to take some juice and glucose tabs " - when he was able to check her blood sugar after that she was in the low 40s. They did not check her blood sugar prior to glucose administration.     She denies any chest pain or trouble breathing. No diarrhea or vomiting. No cough or sinus symptoms. She removed her insulin pump prior to this mornings incident.     Review of Systems   The 10 point Review of Systems is negative other than noted in the HPI or here.     Past Medical History    I have reviewed this patient's medical history and updated it with pertinent information if needed.   Past Medical History:   Diagnosis Date     Chronic abdominal pain      Chronic pancreatitis (H)     S/P pancreatectomy     Depression with anxiety      Diabetes mellitus (H) 1/2012     Gastro-oesophageal reflux disease      Hypothyroidism 4/23/2015     Kidney stones      Low serum cortisol level (H)      Migraines      Other chronic pain     STOMACH     Other chronic pain     LUMBAR SPINE     Spasm of sphincter of Oddi        Past Surgical History   I have reviewed this patient's surgical history and updated it with pertinent information if needed.  Past Surgical History:   Procedure Laterality Date     ARTHROPLASTY CARPOMETACARPAL (THUMB JOINT)  5/2/2014    Procedure: ARTHROPLASTY CARPOMETACARPAL (THUMB JOINT);  Surgeon: Carina Panda MD;  Location: [facility] OR     CHOLECYSTECTOMY  2004     COLONOSCOPY  7/18/2014    Procedure: COLONOSCOPY;  Surgeon: Aurora Sahu MD;  Location:  GI     COLONOSCOPY N/A 8/1/2017    Procedure: COLONOSCOPY;  Colonoscopy and upper endoscopy;  Surgeon: Deirdre Harris MD;  Location:  GI     ENDOSCOPIC RETROGRADE CHOLANGIOPANCREATOGRAM       ENDOSCOPIC RETROGRADE CHOLANGIOPANCREATOGRAM  4/19/2011    Procedure:ENDOSCOPIC RETROGRADE CHOLANGIOPANCREATOGRAM; Pancreatic Stent Placement       ENDOSCOPIC RETROGRADE CHOLANGIOPANCREATOGRAM  5/26/2011    Procedure:ENDOSCOPIC RETROGRADE CHOLANGIOPANCREATOGRAM; with Pancreatic  Stent Removal; Surgeon:DALE MIMS; Location:[facility] OR     ENDOSCOPY UPPER, COLONOSCOPY, COMBINED  4/25/2012    Procedure:COMBINED ENDOSCOPY UPPER, COLONOSCOPY; Enteroscopy with Bile Duct Stent Removal, Colonoscopy  *Latex Safe Room*; Surgeon:GRACY GODWIN; Location:[facility]     ESOPHAGOSCOPY, GASTROSCOPY, DUODENOSCOPY (EGD), COMBINED  5/26/2011    Procedure:COMBINED ESOPHAGOSCOPY, GASTROSCOPY, DUODENOSCOPY (EGD); Surgeon:DALE MIMS; Location[facility]     ESOPHAGOSCOPY, GASTROSCOPY, DUODENOSCOPY (EGD), COMBINED N/A 10/30/2014    Procedure: COMBINED ESOPHAGOSCOPY, GASTROSCOPY, DUODENOSCOPY (EGD), BIOPSY SINGLE OR MULTIPLE;  Surgeon: Sarai Moon MD;  [facility]: [facility] GI     ESOPHAGOSCOPY, GASTROSCOPY, DUODENOSCOPY (EGD), COMBINED Left 7/6/2015    Procedure: COMBINED ESOPHAGOSCOPY, GASTROSCOPY, DUODENOSCOPY (EGD), BIOPSY SINGLE OR MULTIPLE;  Surgeon: Thomas Estrada MD;  Location:  GI     ESOPHAGOSCOPY, GASTROSCOPY, DUODENOSCOPY (EGD), COMBINED N/A 7/8/2016    Procedure: COMBINED ESOPHAGOSCOPY, GASTROSCOPY, DUODENOSCOPY (EGD), BIOPSY SINGLE OR MULTIPLE;  Surgeon: Eloy Klein MD;  Location:  GI     ESOPHAGOSCOPY, GASTROSCOPY, DUODENOSCOPY (EGD), COMBINED N/A 8/4/2016    Procedure: COMBINED ESOPHAGOSCOPY, GASTROSCOPY, DUODENOSCOPY (EGD), BIOPSY SINGLE OR MULTIPLE;  Surgeon: Jason Brown MD;  Location:  GI     ESOPHAGOSCOPY, GASTROSCOPY, DUODENOSCOPY (EGD), COMBINED N/A 8/1/2017    Procedure: COMBINED ESOPHAGOSCOPY, GASTROSCOPY, DUODENOSCOPY (EGD);;  Surgeon: Deirdre Harris MD;  Location:  GI     GYN SURGERY      Hysterectomy and USO     [facility] ENDOSCOPY W EUS  7/20/2011    Procedure:COMBINED ENDOSCOPIC ULTRASOUND, ESOPHAGOSCOPY, GASTROSCOPY, DUODENOSCOPY (EGD); Surgeon:DARVIN DONOHUE; Location: GI     HERNIORRHAPHY VENTRAL N/A 9/15/2016    Procedure: HERNIORRHAPHY VENTRAL;  Surgeon: Juanita Bernabe MD;  [facility]      HYSTERECTOMY  1997 or 1998    USO     INCISION AND DRAINAGE ABDOMEN WASHOUT, COMBINED  8/16/2012    Procedure: COMBINED INCISION AND DRAINAGE ABDOMEN WASHOUT;  ,debridement and Drainage Post Appendectomy;  Surgeon: Ron Austin MD;  [facility]     INJECT TRANSVERSUS ABDOMINIS PLANE (TAP) BLOCK BILATERAL Bilateral 5/26/2016    Procedure: INJECT TRANSVERSUS ABDOMINIS PLANE (TAP) BLOCK BILATERAL;  Surgeon: Leonard Mccallum MD;  [facility]     LAPAROSCOPIC APPENDECTOMY  7/30/2012    Procedure: LAPAROSCOPIC APPENDECTOMY;  Open Appendectomy;  Surgeon: Ron Austin MD;  [facility]: [facility]     PANCREATECTOMY, TRANSPLANT AUTO ISLET CELL, COMBINED  1/6/2012    Procedure:COMBINED PANCREATECTOMY, TRANSPLANT AUTO ISLET CELL; Total  Pancreatectomy, Auto Islet Transplant, splenectomy, 18fr. transgastric-jejunal feeding tube placement, liver biopsy; Surgeon:PALAK LEE; Location:[facility]     REPLACE GASTROSTOMY TUBE, PERCUTANEOUS N/A 8/30/2017    Procedure: REPLACE GASTROSTOMY TUBE, PERCUTANEOUS;  GJ Tube Change;  Surgeon: Jose Nath PA-C;  [facility]     SPLENECTOMY       Social History   Social History   Substance Use Topics     Smoking status: Never Smoker     Smokeless tobacco: Never Used     Alcohol use No   Lives with family. Drinks occasionally.     Family History   I have reviewed this patient's family history and updated it with pertinent information if needed.   Family History   Problem Relation Age of Onset     Hypertension Mother      Diabetes Mother      Osteoporosis Mother      Cancer Father      pancreatic cancer     Diabetes Maternal Grandmother      Cardiovascular Maternal Grandmother      Cancer Maternal Grandfather      lung cancer     Cancer Sister      brain     Cancer Sister      liver cancer     Prior to Admission Medications   Prior to Admission Medications   Prescriptions Last Dose Informant Patient Reported? Taking?   ACCU-CHEK MULTICLIX LANCETS MISC  Self No  No   Sig: by Lancet route. Use to test blood sugar daily or as directed.   DULoxetine (CYMBALTA) 30 MG EC capsule   No No   Sig: Take 1 capsule (30 mg) by mouth daily With 60mg capsule for total dose of 90mg   DULoxetine (CYMBALTA) 60 MG EC capsule   No No   Sig: Take 1 capsule (60 mg) by mouth daily With 30mg capsule for total dose of 90mg   Nutritional Supplements (BOOST HIGH PROTEIN) LIQD  Self No No   Sig: Also can use Ensure clear (available over the counter)   SUMAtriptan (IMITREX) 50 MG tablet  Self No No   Sig: Take 1 tablet (50 mg) by mouth at onset of headache for migraine Take 1 Tab by mouth Once as needed for Migraine Headache. May repeat after two hours.  Maximum dose 200 mg/24 hours.   acetaminophen 500 MG CAPS  Self Yes No   Sig: Take 1,000 mg by mouth three times a day as needed.   alendronate (FOSAMAX) 70 MG tablet  Self No No   Sig: Take 1 tablet (70 mg) by mouth every 7 days On Sundays take first thing in the morning with plain water and remain upright for at least 30 minutes and until after first food of the day    Do not restart Fosamax (alendronate) until your difficulty swallowing has resolved and you have finished the entire course of fluconazole (Diflucan).   alum & mag hydroxide-simethicone (MYLANTA/MAALOX) 200-200-25 MG CHEW chewable tablet   Yes No   Sig: Take 1 tablet by mouth 3 times daily as needed for indigestion   amylase-lipase-protease (CREON 12) 04941 units CPEP   Yes No   Sig: Take 6 to 8 capsules by mouth with meals and take 4 capsules with snacks   aspirin 81 MG tablet  Self Yes No   Sig: Take 81 mg by mouth daily.   blood glucose monitoring (NOEMI CONTOUR NEXT) test strip   No No   Sig: Use to test blood sugar 8 times daily.   blood glucose monitoring (NO BRAND SPECIFIED) test strip  Self No No   Sig: Use to test blood glucoses 6-8 times per day.   cyclobenzaprine (FLEXERIL) 5 MG tablet   Yes No   Sig: Take 10 mg by mouth 2 times daily as needed for muscle spasms   diclofenac  (VOLTAREN) 1 % GEL  Self Yes No   Si g Apply 2 g to skin four times a day as needed (to affected upper extremity joint(s)). Maximum 8g/day per joint, 16g/day total.   dicyclomine (BENTYL) 10 MG capsule   No No   Sig: TAKE ONE CAPSULE BY MOUTH EVERY 6 HOURS AS NEEDED   dronabinol (MARINOL) 2.5 MG capsule   No No   Sig: Take 2 capsules (5 mg) by mouth 2 times daily as needed   fluconazole (DIFLUCAN) 200 MG tablet   No No   Sig: Take 2 tabs the first day and 1 tab for the next 13 days   furosemide (LASIX) 20 MG tablet   No No   Sig: Take 1 tablet (20 mg) by mouth 2 times daily   glucagon (GLUCAGON EMERGENCY) 1 MG kit   No No   Sig: Inject 1 mg into the muscle once for 1 dose   hydrocortisone (CORTEF) 10 MG tablet   No No   Sig: Take 10 mg in the morning and 10 mg at bedtime. Watch for hypoglycemia recurrence.   insulin pen needle needle  Self No No   Sig: RX# [id number]   Pen Needle UC 31G UF IV Mini   Use 4-8 needles per day for insulin injections.       levothyroxine (SYNTHROID/LEVOTHROID) 112 MCG tablet   No No   Sig: Take 1 tablet (112 mcg) by mouth daily   linaclotide (LINZESS) 145 MCG capsule   No No   Sig: Take 1 capsule (145 mcg) by mouth every morning (before breakfast)   metoclopramide (REGLAN) 5 MG tablet   Yes No   Sig: Take 5 mg by mouth 2 times daily as needed   nystatin (MYCOSTATIN) 95177 unit/0.5mL SUSP   No No   Sig: Take 1 mL (100,000 Units) by mouth 4 times daily   ondansetron (ZOFRAN-ODT) 4 MG ODT tab   No No   Sig: DISSOLVE ONE TABLET ON THE TONGUE EVERY 6 HOURS AS NEEDED FOR NAUSEA AND VOMITING   pantoprazole (PROTONIX) 40 MG enteric coated tablet  Self No No   Sig: Take 1 tablet (40 mg) by mouth 2 times daily   polyethylene glycol (MIRALAX/GLYCOLAX) packet  Self Yes No   Sig: Take 1 packet by mouth 2 times daily as needed for constipation    potassium chloride SA (K-DUR/KLOR-CON M) 20 MEQ CR tablet   No No   Sig: Take 1 tablet (20 mEq) by mouth daily   pregabalin (LYRICA) 150 MG capsule   No No    Sig: Take 1 capsule (150 mg) by mouth 3 times daily   senna-docusate (SENOKOT-S;PERICOLACE) 8.6-50 MG per tablet   Yes No   Sig: Take 2 tablets by mouth daily   topiramate (TOPAMAX) 100 MG tablet   No No   Sig: Take 1 tablet (100 mg) by mouth 2 times daily   traZODone (DESYREL) 100 MG tablet   No No   Sig: TAKE 1-2 TABLETS BY MOUTH AN HOUR BEFORE BEDTIME      Facility-Administered Medications: None     Allergies   Allergies   Allergen Reactions     Corticosteroids Other (See Comments)     All oral,IV and injectable steroids are contraindicated (unless in life threatening situations) in Islet Auto transplant recipients. They can cause irreversible loss of islet cell function. Please contact patients transplant care coordinator Malini Julien RN BSN @[phone]/Pager [phone]  and Endocrinologist prior to administration.     Chocolate Flavor Rash     Breaks out when eats chocolate       Physical Exam   /77  Temp 98  F (36.7  C) (Oral)  Resp 16  Wt 64.3 kg (141 lb 11.2 oz)  SpO2 100%  BMI 25.92 kg/m2    Gen: NAD, alert, pleasant, cooperative, non-toxic  HEENT: EOMI, no scleral icterus, tracking appropriately  Resp: CTAB, no crackles or wheezes, no increased WOB  Cardiac: RRR, no S3/S4, no M/R/G appreciated  GI: soft, non-tender, non-distended  Ext: WWP, no edema, spontaneous movement in all 4, mild tremor  Neuro: AOx3, CN 2-12 grossly intact, appropriate mentation    Data     Labs and Imaging reviewed in EPIC

## 2018-09-03 NOTE — LETTER
Transition Communication Hand-off for Care Transitions to Next Level of Care Provider    Name: Chantell Kidd  : 1965  MRN #: [id number]  Primary Care Provider: Ron Morgan     Primary Clinic: [address] [facility] [address]  [facility] [address]     Reason for Hospitalization:  Hypoglycemia [E16.2]  Admit Date/Time: 9/3/2018  7:17 AM  Discharge Date: 18  Payor Source: Payor: MEDICARE / Plan: MEDICARE / Product Type: Medicare /              Reason for Communication Hand-off Referral: Other hypoglycemia--restarted on NJT feeds    Discharge Plan:       Concern for non-adherence with plan of care:   Y/N N  Discharge Needs Assessment:  Needs       Most Recent Value    [employer]. [phone]    DME Equipment Ordered nutrition supplies            Follow-up specialty is recommended: Yes    Follow-up plan:  Future Appointments  Date Time Provider Department Center   2018 8:15 AM Delilah Orosco APRN [facility]   2018 9:00 AM Amena Maher MD [facility]   10/4/2018 10:20 AM Ron Morgan MD [facility]       Any outstanding tests or procedures:              Key Recommendations:      Ernestine Todd    AVS/Discharge Summary is the source of truth; this is a helpful guide for improved communication of patient story

## 2018-09-04 LAB
ANION GAP SERPL CALCULATED.3IONS-SCNC: 7 MMOL/L (ref 3–14)
BACTERIA SPEC CULT: ABNORMAL
BACTERIA SPEC CULT: ABNORMAL
BACTERIA SPEC CULT: NORMAL
BACTERIA SPEC CULT: NORMAL
BUN SERPL-MCNC: 5 MG/DL (ref 7–30)
C PEPTIDE SERPL-MCNC: 0.2 NG/ML (ref 0.9–6.9)
CALCIUM SERPL-MCNC: 8.6 MG/DL (ref 8.5–10.1)
CHLORIDE SERPL-SCNC: 110 MMOL/L (ref 94–109)
CO2 SERPL-SCNC: 25 MMOL/L (ref 20–32)
CREAT SERPL-MCNC: 0.78 MG/DL (ref 0.52–1.04)
ERYTHROCYTE [DISTWIDTH] IN BLOOD BY AUTOMATED COUNT: 14.1 % (ref 10–15)
GFR SERPL CREATININE-BSD FRML MDRD: 77 ML/MIN/1.7M2
GLUCOSE BLDC GLUCOMTR-MCNC: 113 MG/DL (ref 70–99)
GLUCOSE BLDC GLUCOMTR-MCNC: 116 MG/DL (ref 70–99)
GLUCOSE BLDC GLUCOMTR-MCNC: 122 MG/DL (ref 70–99)
GLUCOSE BLDC GLUCOMTR-MCNC: 122 MG/DL (ref 70–99)
GLUCOSE BLDC GLUCOMTR-MCNC: 138 MG/DL (ref 70–99)
GLUCOSE BLDC GLUCOMTR-MCNC: 141 MG/DL (ref 70–99)
GLUCOSE BLDC GLUCOMTR-MCNC: 148 MG/DL (ref 70–99)
GLUCOSE BLDC GLUCOMTR-MCNC: 149 MG/DL (ref 70–99)
GLUCOSE BLDC GLUCOMTR-MCNC: 151 MG/DL (ref 70–99)
GLUCOSE BLDC GLUCOMTR-MCNC: 165 MG/DL (ref 70–99)
GLUCOSE BLDC GLUCOMTR-MCNC: 169 MG/DL (ref 70–99)
GLUCOSE BLDC GLUCOMTR-MCNC: 179 MG/DL (ref 70–99)
GLUCOSE BLDC GLUCOMTR-MCNC: 188 MG/DL (ref 70–99)
GLUCOSE BLDC GLUCOMTR-MCNC: 58 MG/DL (ref 70–99)
GLUCOSE BLDC GLUCOMTR-MCNC: 73 MG/DL (ref 70–99)
GLUCOSE BLDC GLUCOMTR-MCNC: 82 MG/DL (ref 70–99)
GLUCOSE BLDC GLUCOMTR-MCNC: 87 MG/DL (ref 70–99)
GLUCOSE BLDC GLUCOMTR-MCNC: 98 MG/DL (ref 70–99)
GLUCOSE BLDC GLUCOMTR-MCNC: 99 MG/DL (ref 70–99)
GLUCOSE SERPL-MCNC: 106 MG/DL (ref 70–99)
HCT VFR BLD AUTO: 37 % (ref 35–47)
HGB BLD-MCNC: 12.2 G/DL (ref 11.7–15.7)
Lab: NORMAL
Lab: NORMAL
MCH RBC QN AUTO: 30.6 PG (ref 26.5–33)
MCHC RBC AUTO-ENTMCNC: 33 G/DL (ref 31.5–36.5)
MCV RBC AUTO: 93 FL (ref 78–100)
PLATELET # BLD AUTO: 333 10E9/L (ref 150–450)
POTASSIUM SERPL-SCNC: 3.4 MMOL/L (ref 3.4–5.3)
POTASSIUM SERPL-SCNC: 3.8 MMOL/L (ref 3.4–5.3)
RBC # BLD AUTO: 3.99 10E12/L (ref 3.8–5.2)
SODIUM SERPL-SCNC: 142 MMOL/L (ref 133–144)
SPECIMEN SOURCE: ABNORMAL
SPECIMEN SOURCE: NORMAL
SPECIMEN SOURCE: NORMAL
WBC # BLD AUTO: 11.5 10E9/L (ref 4–11)

## 2018-09-04 PROCEDURE — 36415 COLL VENOUS BLD VENIPUNCTURE: CPT | Performed by: STUDENT IN AN ORGANIZED HEALTH CARE EDUCATION/TRAINING PROGRAM

## 2018-09-04 PROCEDURE — 84132 ASSAY OF SERUM POTASSIUM: CPT | Performed by: PEDIATRICS

## 2018-09-04 PROCEDURE — 25000125 ZZHC RX 250: Performed by: STUDENT IN AN ORGANIZED HEALTH CARE EDUCATION/TRAINING PROGRAM

## 2018-09-04 PROCEDURE — 25000132 ZZH RX MED GY IP 250 OP 250 PS 637: Mod: GY | Performed by: STUDENT IN AN ORGANIZED HEALTH CARE EDUCATION/TRAINING PROGRAM

## 2018-09-04 PROCEDURE — 25800025 ZZH RX 258: Performed by: STUDENT IN AN ORGANIZED HEALTH CARE EDUCATION/TRAINING PROGRAM

## 2018-09-04 PROCEDURE — 25000128 H RX IP 250 OP 636: Performed by: STUDENT IN AN ORGANIZED HEALTH CARE EDUCATION/TRAINING PROGRAM

## 2018-09-04 PROCEDURE — 36415 COLL VENOUS BLD VENIPUNCTURE: CPT | Performed by: PEDIATRICS

## 2018-09-04 PROCEDURE — 85027 COMPLETE CBC AUTOMATED: CPT | Performed by: STUDENT IN AN ORGANIZED HEALTH CARE EDUCATION/TRAINING PROGRAM

## 2018-09-04 PROCEDURE — 99233 SBSQ HOSP IP/OBS HIGH 50: CPT | Mod: GC | Performed by: INTERNAL MEDICINE

## 2018-09-04 PROCEDURE — 00000146 ZZHCL STATISTIC GLUCOSE BY METER IP

## 2018-09-04 PROCEDURE — 80048 BASIC METABOLIC PNL TOTAL CA: CPT | Performed by: STUDENT IN AN ORGANIZED HEALTH CARE EDUCATION/TRAINING PROGRAM

## 2018-09-04 PROCEDURE — A9270 NON-COVERED ITEM OR SERVICE: HCPCS | Mod: GY | Performed by: STUDENT IN AN ORGANIZED HEALTH CARE EDUCATION/TRAINING PROGRAM

## 2018-09-04 PROCEDURE — 12000026 ZZH R&B TRANSPLANT

## 2018-09-04 RX ORDER — KETOROLAC TROMETHAMINE 30 MG/ML
30 INJECTION, SOLUTION INTRAMUSCULAR; INTRAVENOUS EVERY 6 HOURS PRN
Status: DISCONTINUED | OUTPATIENT
Start: 2018-09-04 | End: 2018-09-07 | Stop reason: HOSPADM

## 2018-09-04 RX ADMIN — SENNOSIDES AND DOCUSATE SODIUM 2 TABLET: 8.6; 5 TABLET ORAL at 08:11

## 2018-09-04 RX ADMIN — DEXTROSE MONOHYDRATE 25 ML: 500 INJECTION PARENTERAL at 11:04

## 2018-09-04 RX ADMIN — TOPIRAMATE 100 MG: 100 TABLET, FILM COATED ORAL at 20:31

## 2018-09-04 RX ADMIN — PROCHLORPERAZINE EDISYLATE 10 MG: 5 INJECTION INTRAMUSCULAR; INTRAVENOUS at 10:26

## 2018-09-04 RX ADMIN — LEVOTHYROXINE SODIUM 112 MCG: 112 TABLET ORAL at 08:09

## 2018-09-04 RX ADMIN — ASPIRIN 81 MG CHEWABLE TABLET 81 MG: 81 TABLET CHEWABLE at 08:12

## 2018-09-04 RX ADMIN — FUROSEMIDE 20 MG: 20 TABLET ORAL at 20:20

## 2018-09-04 RX ADMIN — PANTOPRAZOLE SODIUM 40 MG: 40 TABLET, DELAYED RELEASE ORAL at 08:09

## 2018-09-04 RX ADMIN — HYDROCORTISONE 10 MG: 10 TABLET ORAL at 20:20

## 2018-09-04 RX ADMIN — NYSTATIN 100000 UNITS: 100000 SUSPENSION ORAL at 08:10

## 2018-09-04 RX ADMIN — NYSTATIN 100000 UNITS: 100000 SUSPENSION ORAL at 20:19

## 2018-09-04 RX ADMIN — PANCRELIPASE 72000 UNITS: 60000; 12000; 38000 CAPSULE, DELAYED RELEASE PELLETS ORAL at 13:44

## 2018-09-04 RX ADMIN — DULOXETINE HYDROCHLORIDE 60 MG: 60 CAPSULE, DELAYED RELEASE ORAL at 10:05

## 2018-09-04 RX ADMIN — DULOXETINE HYDROCHLORIDE 30 MG: 30 CAPSULE, DELAYED RELEASE ORAL at 08:11

## 2018-09-04 RX ADMIN — PREGABALIN 150 MG: 75 CAPSULE ORAL at 08:09

## 2018-09-04 RX ADMIN — PREGABALIN 150 MG: 75 CAPSULE ORAL at 13:45

## 2018-09-04 RX ADMIN — ONDANSETRON 4 MG: 4 TABLET, ORALLY DISINTEGRATING ORAL at 08:31

## 2018-09-04 RX ADMIN — FUROSEMIDE 20 MG: 20 TABLET ORAL at 08:12

## 2018-09-04 RX ADMIN — DICYCLOMINE HYDROCHLORIDE 10 MG: 10 CAPSULE ORAL at 13:46

## 2018-09-04 RX ADMIN — PANCRELIPASE 72000 UNITS: 60000; 12000; 38000 CAPSULE, DELAYED RELEASE PELLETS ORAL at 18:21

## 2018-09-04 RX ADMIN — NYSTATIN 100000 UNITS: 100000 SUSPENSION ORAL at 16:22

## 2018-09-04 RX ADMIN — DICYCLOMINE HYDROCHLORIDE 10 MG: 10 CAPSULE ORAL at 16:22

## 2018-09-04 RX ADMIN — PANTOPRAZOLE SODIUM 40 MG: 40 TABLET, DELAYED RELEASE ORAL at 20:20

## 2018-09-04 RX ADMIN — PANCRELIPASE 72000 UNITS: 60000; 12000; 38000 CAPSULE, DELAYED RELEASE PELLETS ORAL at 08:08

## 2018-09-04 RX ADMIN — DICYCLOMINE HYDROCHLORIDE 10 MG: 10 CAPSULE ORAL at 08:12

## 2018-09-04 RX ADMIN — KETOROLAC TROMETHAMINE 30 MG: 30 INJECTION, SOLUTION INTRAMUSCULAR at 13:50

## 2018-09-04 RX ADMIN — TOPIRAMATE 100 MG: 100 TABLET, FILM COATED ORAL at 08:11

## 2018-09-04 RX ADMIN — CYCLOBENZAPRINE HYDROCHLORIDE 10 MG: 5 TABLET, FILM COATED ORAL at 00:29

## 2018-09-04 RX ADMIN — ACETAMINOPHEN 500 MG: 500 TABLET, FILM COATED ORAL at 08:08

## 2018-09-04 RX ADMIN — CEFTRIAXONE 1 G: 1 INJECTION, POWDER, FOR SOLUTION INTRAMUSCULAR; INTRAVENOUS at 16:23

## 2018-09-04 RX ADMIN — DICYCLOMINE HYDROCHLORIDE 10 MG: 10 CAPSULE ORAL at 20:20

## 2018-09-04 RX ADMIN — PREGABALIN 150 MG: 75 CAPSULE ORAL at 20:20

## 2018-09-04 RX ADMIN — HYDROCORTISONE 10 MG: 10 TABLET ORAL at 08:12

## 2018-09-04 RX ADMIN — POTASSIUM CHLORIDE 10 MEQ: 10 INJECTION, SOLUTION INTRAVENOUS at 16:41

## 2018-09-04 RX ADMIN — LINACLOTIDE 145 MCG: 145 CAPSULE, GELATIN COATED ORAL at 08:12

## 2018-09-04 RX ADMIN — NYSTATIN 100000 UNITS: 100000 SUSPENSION ORAL at 13:45

## 2018-09-04 ASSESSMENT — PAIN DESCRIPTION - DESCRIPTORS
DESCRIPTORS: CONSTANT;SHARP;THROBBING
DESCRIPTORS: THROBBING
DESCRIPTORS: CONSTANT;SHARP

## 2018-09-04 ASSESSMENT — ACTIVITIES OF DAILY LIVING (ADL)
ADLS_ACUITY_SCORE: 10

## 2018-09-04 NOTE — PROGRESS NOTES
Endocrine Consult Follow Up   Patient: Chantell Kidd   MRN: [id number]  Date of Service: 9/4/2018    Subjective:  No acute event overnight. The abdominal pain has slightly improved. D10 was off this morning. She was able to eat breakfast. BGs were in a good range. She is worried that her BG would be low again without D10. Her  will bring insulin pump tonight.    Medications:  Current Facility-Administered Medications   Medication     acetaminophen (TYLENOL) tablet 500 mg     alum & mag hydroxide-simethicone (MYLANTA/MAALOX) 200-200-25 MG chewable tablet 1 tablet     amylase-lipase-protease (CREON 12) 66721 units per capsule 72,000-96,000 Units     aspirin chewable tablet 81 mg     cefTRIAXone (ROCEPHIN) 1 g vial to attach to  mL bag for ADULTS or NS 50 mL bag for PEDS     cyclobenzaprine (FLEXERIL) tablet 10 mg     glucose gel 15-30 g    Or     dextrose 50 % injection 25-50 mL    Or     glucagon injection 1 mg     dicyclomine (BENTYL) capsule 10 mg     DULoxetine (CYMBALTA) EC capsule 30 mg     DULoxetine (CYMBALTA) EC capsule 60 mg     furosemide (LASIX) tablet 20 mg     glucagon kit 1 mg     hydrocortisone (CORTEF) tablet 10 mg     ketorolac (TORADOL) injection 30 mg     levothyroxine (SYNTHROID/LEVOTHROID) tablet 112 mcg     lidocaine (LMX4) cream     lidocaine 1 % 1 mL     linaclotide (LINZESS) capsule 145 mcg     melatonin tablet 1 mg     metoclopramide (REGLAN) tablet 5 mg     naloxone (NARCAN) injection 0.1-0.4 mg     nystatin (MYCOSTATIN) 930272 unit/mL suspension 100,000 Units     ondansetron (ZOFRAN-ODT) ODT tab 4 mg    Or     ondansetron (ZOFRAN) injection 4 mg     pantoprazole (PROTONIX) EC tablet 40 mg     polyethylene glycol (MIRALAX/GLYCOLAX) Packet 17 g     potassium chloride (KLOR-CON) Packet 20-40 mEq     potassium chloride 10 mEq in 100 mL intermittent infusion with 10 mg lidocaine     potassium chloride 10 mEq in 100 mL sterile water intermittent infusion (premix)     potassium  chloride 20 mEq in 50 mL intermittent infusion     potassium chloride SA (K-DUR/KLOR-CON M) CR tablet 20 mEq     potassium chloride SA (K-DUR/KLOR-CON M) CR tablet 20-40 mEq     pregabalin (LYRICA) capsule 150 mg     prochlorperazine (COMPAZINE) injection 10 mg    Or     prochlorperazine (COMPAZINE) tablet 10 mg    Or     prochlorperazine (COMPAZINE) Suppository 25 mg     senna-docusate (SENOKOT-S;PERICOLACE) 8.6-50 MG per tablet 2 tablet     sodium chloride (PF) 0.9% PF flush 3 mL     sodium chloride (PF) 0.9% PF flush 3 mL     SUMAtriptan (IMITREX) tablet 50 mg     topiramate (TOPAMAX) tablet 100 mg     traZODone (DESYREL) tablet 100 mg         Physical Examination:  Blood pressure 131/79, pulse 74, temperature 99.2  F (37.3  C), temperature source Oral, resp. rate 16, weight 64.3 kg (141 lb 11.2 oz), SpO2 97 %, not currently breastfeeding.  GEN: Awake, alert, no distress.  HEENT: EOMI.  NECK: No thyroid gland enlargement. No cervical or clavicular LAD.   CV: RRR with no murmur.   RESP: CTA bilaterally.   ABD: Soft, non-tender, and non-distended, with normal BS.   EXT: No peripheral edema.   SKIN: Normal skin temperature and turgor with no lesions.   NEURO: Normoactive reflexes. No tremor.     Labs:     Recent Labs  Lab 09/04/18  1301 09/04/18  1210 09/04/18  1122 09/04/18  1100 09/04/18  1011 09/04/18  0903  09/04/18  0632  09/03/18  1841  09/03/18  0855  08/29/18  0521   GLC  --   --   --   --   --   --   --  106*  --  132*  --  124*  --  102*   * 82 151* 58* 73 98  < >  --   < >  --   < >  --   < >  --    < > = values in this interval not displayed.    9/3/2018 6PM  Plasma glucose 132, insulin  45.6, c-peptide 0.2  Sulfonylurea screening--pending    Assessment:  Chantell Kidd is a 52 year old female with PMHx of chronic pancreatitis s/p TPIAT, nomi-en-Y, and splenectomy in 2012, IDDM on insulin pump, recurrent severe hypoglycemia, hypothyroidism, BRENDAN, prior GJ, and possible adrenal insufficiency who was  recently admitted on 8/26/18 for severe hypoglycemia which thought to be 2/2 exogenous insulin use, alcohol and poor po intake, she presented to the ED again on 9/3/2018 for hypoglycemia episode that led to LOC. Insulin and C-peptide on 9/3 is compatible with exogenous insulin.    # Severe, recurrent hypoglycemia  This episode started after she was placed on the pump. However, she disconnected the pump since 9/2 AM, the novolog effect should wears off by 4-6 hours, but she continued to have recurrent low episodes, this is very unusual. The labs on 9/3 showed Plasma glucose 132, insulin  45.6, c-peptide 0.2 which is compatible with exogenous insulin. Unclear why she had exogenous insulin in her system >24 hour off the pump if she used novolog with the recommended setting. She might use big bolus? It would be good to know the pump setting and insulin type that she put in the pump (she reports using Novolog). As well as Dexcom data even she was not wearing it during the episode, at least to know her BG pattern. Also not sure if she took sulfonylurea or not. Would like to get more information about her living situation and home environment as well.       # IDDM on home insulin pump  Patient with hx of TPIAT with reported beta-cell function, but she is, at times, reliant on daily insulin. She has detectable c-peptide levels in 2/2018. Follows with Dr. Maher in clinic. However, from the labs this admission, it showed that she had exogenous insulin in her system longer and higher than she should be. May need to re-evaluate her beta-cell function once completely off the exogenous insulin.       # Possible secondary adrenal insufficiency  Patient originally diagnosed in 03/2016 with possible opioid-related secondary AI, however this diagnosis is questioned by her primary endocrinologist. She has remained on 10mg hydrocortisone BID since 2016. Will have to re-evaluate if the patient has adrenal insufficiency or not since the  hydrocortisone could suppress the pituitary response to hypoglycemia and led to severe hypoglycemia.     Recommendations:  - Off D10  - PO intake as tolerated  - BG q4h  - Hypoglycemia protocol -- start treatment if BG <60 or symptomatic  - Will check her insulin pump and CGM data. The patient said her son can bring the devices in tomorrow  - Continue hydrocortisone 10 mg bid   - If BG >200, will treat with low SSI     Endocrine will continue to follow.     Larissa Mckinley MD  Endocrine fellow  [phone]    Endocrine Staff Note    The patient was seen and examined by me with .  Her note details our mutual findings and plan.    Mitlai Ward MD

## 2018-09-04 NOTE — PROGRESS NOTES
Shift: 0700 - 1930  VS: Temp: 99.2  F (37.3  C) Temp src: Oral BP: 131/79 Pulse: 74 Heart Rate: 62 Resp: 16 SpO2: 97 % O2 Device: None (Room air)    Pain: Expresses pain to abdomen radiating to back, gave toradol 30mg IVP x1, patient reports pain is tolerable.   Neuro: A&Ox4. Reports neuropathy to rosenda ELLIOTT's, lyrica given as ordered. Cooperative with care, somewhat anxious.   Cardiac:   WDL  Respiratory: Lung sounds clear to diminished on RA.   GI/Diet/Appetite: Regular diet, appetite is improving. Nausea with emesis this AM gave zofran prior to episode and then compazine after emesis. Patient reports nausea greatly diminished. LBM 9/4.   :  Voiding w/o difficulty  LDA's: PIV to R&L UE, SL.  Skin: WDL  Activity: Up ad dylan  Tests/Procedures:   Pertinent Labs/Lab Collection: Blood glucose checks Q1hr.      Plan: Continue to monitor blood glucose hourly. Contact team if blood glucose is >or= 200.  bringing in insulin pump around 5pm this evening.

## 2018-09-04 NOTE — PLAN OF CARE
Problem: Diabetes Comorbidity  Goal: Diabetes  Patient comorbidity will be monitored for signs and symptoms of hyperglycemia or hypoglycemia. Problems will be absent, minimized or managed by discharge/transition of care.   Outcome: No Change  /79 (BP Location: Right arm)  Pulse 74  Temp 99.2  F (37.3  C) (Oral)  Resp 16  Wt 64.3 kg (141 lb 11.2 oz)  SpO2 97%  BMI 25.92 kg/m2    Patient blood glucose continues to drop w/o intervention, as if receiving continuous insulin. Lowest BG today 58, gave 25ml dextrose at ~1115, BG re-check 151. BG at 1200 was 82. Continue to check hourly, encourage food and fluids. Poor appetite. MD is aware.

## 2018-09-04 NOTE — PROGRESS NOTES
[facility], [place]    Internal Medicine Progress Note - [facility] Service    Main Plans for Today    - Endocrine consult   - Stop D10   - Monitor blood sugars   - Have family bring in pump and dexcom    - IV toradol    Assessment & Plan   Chantell Kidd is a 52 year old female admitted on 9/3/2018. She has a history of history of chronic pancreatitis s/p pancreatectomy with auto islet transplantation, resultant T1DM, chronic abd pain, GERD, anxiety depression and is admitted for hypoglycemia.      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 Recently needed steroids increased to 20mg BID to keep sugars up. C peptide decreased with elevated blood insulin level consistent with exogenous insulin - although should have been metabolized out of the body at this point, so unclear what type of insulin is being used.    - Endocrine consult, appreciate assistance   - Hypoglycemia protocol   - Continue 10mg BID hydrocortisone   - D50 PRN   - Continue PTA creon     # Abdominal Pain/Nausea  # Headache  CT ab/pel 8/26 showing mild gastroenteritis per read. Pain at baseline per patient.    - Zofran PRN   - Encourage PO intake to maintain sugars   - Tylenol PRN   - IV toradol PRN     # Lactic acidosis  # Leukocytosis   2.2 on admission --> 0.9 with fluids. WBC 22 on admission. Likely secondary to dehydration and hypoglycemia along with inflammatory response to hypoglycemia. No other sign of systemic infection. No antibiotics at this point, will closely monitor. Low threshold to rescan abdomen due to continued pain and leukocytosis. UA with large LE, no nitrites and few WBC - combined with abdominal pain/flank pain. No dysuria, will not continue to treat, but will monitor. S/p IV ceftriaxone.       Chronic Medical Problems    - CIERRA: PTA cymbalta   - Hypothyroid: PTA levothyroxine   - Adrenal Insufficiency: continue pta  hydrocortisone   - Chronic Pain: continue pta tylenol, simethicone, dicyclomine    # Pain Assessment:  Current Pain Score 9/4/2018   Patient currently in pain? yes   Pain score (0-10) -   Pain location Back   Pain descriptors Constant;Sharp   - Chantell is experiencing pain due to abdominal pain. Pain management was discussed and the plan was created in a collaborative fashion.  Chantell's response to the current recommendations: mixed response  - Please see the plan for pain management as documented above        Diet: Combination Diet Regular Diet Adult  Fluids: Encourage oral  DVT Prophylaxis: Low Risk/Ambulatory with no VTE prophylaxis indicated  Code Status: Full Code    Disposition Plan   Expected discharge: 2 - 3 days, recommended to prior living arrangement once blood sugar stablized.     Entered: Sonia Severino 09/04/2018, 5:46 PM   Information in the above section will display in the discharge planner report.      The patient's care was discussed with the Attending Physician, Dr. Millard.    Sonia Severino MD  [facility]: 5  Pager: [phone]  Please see sticky note for cross cover information    Interval History   Afebrile. Blood sugars maintained on D10/0.5NS drip overnight. Attempting to eat more. Endorsing continued chronic abdominal pain, agreeable to trying toradol. Family will be present this evening. Has previously had G-J tube, would be agreeable to NJ for nutrition if necessary.     Physical Exam   Vital Signs: Temp: 98.6  F (37  C) Temp src: Oral BP: 125/76 Pulse: 74 Heart Rate: 76 Resp: 16 SpO2: 97 % O2 Device: None (Room air)    Weight: 141 lbs 11.2 oz    Gen: NAD, alert, pleasant, cooperative, non-toxic  HEENT: EOMI, no scleral icterus, tracking appropriately  Resp: CTAB, no crackles or wheezes, no increased WOB  Cardiac: RRR, no S3/S4, no M/R/G appreciated  GI: soft, non-tender, non-distended  Ext: WWP, no edema  Neuro: AOx3, CN 2-12 grossly intact, appropriate mentation      Data   Labs and Imaging reviewed in EPIC

## 2018-09-04 NOTE — PLAN OF CARE
Problem: Patient Care Overview  Goal: Plan of Care/Patient Progress Review  Outcome: Improving  /78 (BP Location: Right arm)  Temp 98.1  F (36.7  C) (Oral)  Resp 16  Wt 64.3 kg (141 lb 11.2 oz)  SpO2 97%  BMI 25.92 kg/m2. Vitals stable on RA.  BG range: 116-149. Pt. c/o back pain & given Flexeril with some relief. No c/o's nausea.  MIVF at 100cc/hour via right PIV. Pt. Up ad dylan.  Voiding adequate amounts, no stools this shift. Pt. slept fair between cares. Continue to monitor per POC & notify team with changes.

## 2018-09-04 NOTE — PROGRESS NOTES
Shift: 0700 - 1930    /76 (BP Location: Right arm)  Pulse 74  Temp 98.6  F (37  C) (Oral)  Resp 16  Wt 64.3 kg (141 lb 11.2 oz)  SpO2 97%  BMI 25.92 kg/m2     Pain: Expresses pain to abdomen radiating to back, gave toradol 30mg IVP x1, patient reports pain is tolerable.   Neuro: A&Ox4. Reports neuropathy to rosenda ELLIOTT's, lyrica given as ordered. Cooperative with care, somewhat anxious.   Cardiac:   WDL  Respiratory: Lung sounds clear to diminished on RA.   GI/Diet/Appetite: Regular diet, appetite is improving. Nausea with emesis this AM gave zofran prior to episode and then compazine after emesis. Patient reports nausea greatly diminished. LBM 9/4. Blood glucose at 1100 today 58, gave 25ml of dextrose, recheck at 151. No hypoglycemic episodes since. Monitor blood glucose Q2hr.  :  Voiding w/o difficulty. Receiving Rocephin 1gm IV for UTI.   LDA's: PIV to R&L UE, SL.  Skin: WDL  Activity: Up ad dylan  Tests/Procedures:   Pertinent Labs/Lab Collection: Blood glucose checks Q1hr. ABN UC= Group B Streptococci, prescribed IV Rocephin. Potassium 3.4, replacement scheduled by MD, patient refused oral potassium started IV potassium at 1700 running over 1.5hrs (no lidocaine)      Plan: Continue to monitor blood glucose hourly. Contact team if blood glucose is >or= 200.  bringing in insulin pump around 5pm this evening.

## 2018-09-04 NOTE — PLAN OF CARE
Problem: Diabetes Comorbidity  Intervention: Optimize Glycemic Control  9699-0298: AVSS on RA. BG volatile, ranged .  Pt received a total of 75 ml d50 during this shift. MD notified of glucose, D10 infusion increased to 100 ml/hr, and 10 mg hydrocortisone given, labs ordered, pt remains asymptomatic. Oral intake encouraged but pt c/o nausea. PRN Reglan given x1 w/ fair relief, and pt currently eating dinner. K 3.3, refused oral potassium, IV replacement currently infusing into PIV. WBC elevated and UA positive, started on Rocephin. Second UA to be collected w/ next void. C/o headache, PRN Imitrex ordered from pharmacy, will administer upon arrival to unit. No BM. Up ad dylan. Seen by endocrinology. Will continue to monitor and update team w/ changes.

## 2018-09-05 ENCOUNTER — APPOINTMENT (OUTPATIENT)
Dept: GENERAL RADIOLOGY | Facility: CLINIC | Age: 53
DRG: 638 | End: 2018-09-05
Attending: INTERNAL MEDICINE
Payer: MEDICARE

## 2018-09-05 LAB
ANION GAP SERPL CALCULATED.3IONS-SCNC: 8 MMOL/L (ref 3–14)
BUN SERPL-MCNC: 11 MG/DL (ref 7–30)
CALCIUM SERPL-MCNC: 8.5 MG/DL (ref 8.5–10.1)
CHLORIDE SERPL-SCNC: 107 MMOL/L (ref 94–109)
CO2 SERPL-SCNC: 25 MMOL/L (ref 20–32)
CREAT SERPL-MCNC: 0.92 MG/DL (ref 0.52–1.04)
GFR SERPL CREATININE-BSD FRML MDRD: 63 ML/MIN/1.7M2
GLUCOSE BLDC GLUCOMTR-MCNC: 101 MG/DL (ref 70–99)
GLUCOSE BLDC GLUCOMTR-MCNC: 108 MG/DL (ref 70–99)
GLUCOSE BLDC GLUCOMTR-MCNC: 109 MG/DL (ref 70–99)
GLUCOSE BLDC GLUCOMTR-MCNC: 171 MG/DL (ref 70–99)
GLUCOSE BLDC GLUCOMTR-MCNC: 83 MG/DL (ref 70–99)
GLUCOSE BLDC GLUCOMTR-MCNC: 89 MG/DL (ref 70–99)
GLUCOSE BLDC GLUCOMTR-MCNC: 99 MG/DL (ref 70–99)
GLUCOSE SERPL-MCNC: 101 MG/DL (ref 70–99)
LIPASE SERPL-CCNC: 35 U/L (ref 73–393)
MAGNESIUM SERPL-MCNC: 2.2 MG/DL (ref 1.6–2.3)
PHOSPHATE SERPL-MCNC: 3.5 MG/DL (ref 2.5–4.5)
POTASSIUM SERPL-SCNC: 3.7 MMOL/L (ref 3.4–5.3)
PREALB SERPL IA-MCNC: 18 MG/DL (ref 15–45)
SODIUM SERPL-SCNC: 140 MMOL/L (ref 133–144)
SPECIMEN SOURCE: NORMAL
WET PREP SPEC: NORMAL

## 2018-09-05 PROCEDURE — 12000026 ZZH R&B TRANSPLANT

## 2018-09-05 PROCEDURE — 84100 ASSAY OF PHOSPHORUS: CPT | Performed by: STUDENT IN AN ORGANIZED HEALTH CARE EDUCATION/TRAINING PROGRAM

## 2018-09-05 PROCEDURE — 27210429 ZZH NUTRITION PRODUCT INTERMEDIATE LITER

## 2018-09-05 PROCEDURE — 87491 CHLMYD TRACH DNA AMP PROBE: CPT | Performed by: INTERNAL MEDICINE

## 2018-09-05 PROCEDURE — 80048 BASIC METABOLIC PNL TOTAL CA: CPT | Performed by: STUDENT IN AN ORGANIZED HEALTH CARE EDUCATION/TRAINING PROGRAM

## 2018-09-05 PROCEDURE — 44500 INTRO GASTROINTESTINAL TUBE: CPT

## 2018-09-05 PROCEDURE — 83735 ASSAY OF MAGNESIUM: CPT | Performed by: STUDENT IN AN ORGANIZED HEALTH CARE EDUCATION/TRAINING PROGRAM

## 2018-09-05 PROCEDURE — 36415 COLL VENOUS BLD VENIPUNCTURE: CPT | Performed by: STUDENT IN AN ORGANIZED HEALTH CARE EDUCATION/TRAINING PROGRAM

## 2018-09-05 PROCEDURE — 25000131 ZZH RX MED GY IP 250 OP 636 PS 637: Mod: GY | Performed by: STUDENT IN AN ORGANIZED HEALTH CARE EDUCATION/TRAINING PROGRAM

## 2018-09-05 PROCEDURE — 99233 SBSQ HOSP IP/OBS HIGH 50: CPT | Mod: GC | Performed by: INTERNAL MEDICINE

## 2018-09-05 PROCEDURE — 25000132 ZZH RX MED GY IP 250 OP 250 PS 637: Mod: GY | Performed by: INTERNAL MEDICINE

## 2018-09-05 PROCEDURE — A9270 NON-COVERED ITEM OR SERVICE: HCPCS | Mod: GY | Performed by: STUDENT IN AN ORGANIZED HEALTH CARE EDUCATION/TRAINING PROGRAM

## 2018-09-05 PROCEDURE — 87591 N.GONORRHOEAE DNA AMP PROB: CPT | Performed by: INTERNAL MEDICINE

## 2018-09-05 PROCEDURE — 25000128 H RX IP 250 OP 636: Performed by: STUDENT IN AN ORGANIZED HEALTH CARE EDUCATION/TRAINING PROGRAM

## 2018-09-05 PROCEDURE — 83690 ASSAY OF LIPASE: CPT | Performed by: STUDENT IN AN ORGANIZED HEALTH CARE EDUCATION/TRAINING PROGRAM

## 2018-09-05 PROCEDURE — 00000146 ZZHCL STATISTIC GLUCOSE BY METER IP

## 2018-09-05 PROCEDURE — 25000132 ZZH RX MED GY IP 250 OP 250 PS 637: Mod: GY | Performed by: STUDENT IN AN ORGANIZED HEALTH CARE EDUCATION/TRAINING PROGRAM

## 2018-09-05 PROCEDURE — A9270 NON-COVERED ITEM OR SERVICE: HCPCS | Mod: GY | Performed by: INTERNAL MEDICINE

## 2018-09-05 PROCEDURE — 84134 ASSAY OF PREALBUMIN: CPT | Performed by: STUDENT IN AN ORGANIZED HEALTH CARE EDUCATION/TRAINING PROGRAM

## 2018-09-05 PROCEDURE — 87210 SMEAR WET MOUNT SALINE/INK: CPT | Performed by: INTERNAL MEDICINE

## 2018-09-05 PROCEDURE — 25000125 ZZHC RX 250: Performed by: INTERNAL MEDICINE

## 2018-09-05 RX ORDER — SODIUM BICARBONATE 325 MG/1
325 TABLET ORAL EVERY 4 HOURS
Status: DISCONTINUED | OUTPATIENT
Start: 2018-09-05 | End: 2018-09-07 | Stop reason: HOSPADM

## 2018-09-05 RX ORDER — ACETAMINOPHEN 500 MG
1000 TABLET ORAL 3 TIMES DAILY
Status: DISCONTINUED | OUTPATIENT
Start: 2018-09-05 | End: 2018-09-07 | Stop reason: HOSPADM

## 2018-09-05 RX ADMIN — DULOXETINE HYDROCHLORIDE 30 MG: 30 CAPSULE, DELAYED RELEASE ORAL at 08:28

## 2018-09-05 RX ADMIN — NYSTATIN 100000 UNITS: 100000 SUSPENSION ORAL at 08:32

## 2018-09-05 RX ADMIN — SENNOSIDES AND DOCUSATE SODIUM 2 TABLET: 8.6; 5 TABLET ORAL at 08:31

## 2018-09-05 RX ADMIN — DICYCLOMINE HYDROCHLORIDE 10 MG: 10 CAPSULE ORAL at 15:46

## 2018-09-05 RX ADMIN — DICYCLOMINE HYDROCHLORIDE 10 MG: 10 CAPSULE ORAL at 08:29

## 2018-09-05 RX ADMIN — PANCRELIPASE 72000 UNITS: 60000; 12000; 38000 CAPSULE, DELAYED RELEASE PELLETS ORAL at 08:26

## 2018-09-05 RX ADMIN — LINACLOTIDE 145 MCG: 145 CAPSULE, GELATIN COATED ORAL at 08:28

## 2018-09-05 RX ADMIN — DICYCLOMINE HYDROCHLORIDE 10 MG: 10 CAPSULE ORAL at 11:57

## 2018-09-05 RX ADMIN — HYDROCORTISONE 10 MG: 10 TABLET ORAL at 08:28

## 2018-09-05 RX ADMIN — CEFTRIAXONE 1 G: 1 INJECTION, POWDER, FOR SOLUTION INTRAMUSCULAR; INTRAVENOUS at 17:49

## 2018-09-05 RX ADMIN — KETOROLAC TROMETHAMINE 30 MG: 30 INJECTION, SOLUTION INTRAMUSCULAR at 00:17

## 2018-09-05 RX ADMIN — NYSTATIN 100000 UNITS: 100000 SUSPENSION ORAL at 19:38

## 2018-09-05 RX ADMIN — SODIUM BICARBONATE 325 MG: 325 TABLET ORAL at 15:46

## 2018-09-05 RX ADMIN — PANCRELIPASE 24000 UNITS: 60000; 12000; 38000 CAPSULE, DELAYED RELEASE PELLETS ORAL at 15:46

## 2018-09-05 RX ADMIN — POTASSIUM CHLORIDE 10 MEQ: 10 INJECTION, SOLUTION INTRAVENOUS at 12:02

## 2018-09-05 RX ADMIN — PANCRELIPASE 12000 UNITS: 60000; 12000; 38000 CAPSULE, DELAYED RELEASE PELLETS ORAL at 21:39

## 2018-09-05 RX ADMIN — FUROSEMIDE 20 MG: 20 TABLET ORAL at 19:39

## 2018-09-05 RX ADMIN — TRAZODONE HYDROCHLORIDE 100 MG: 100 TABLET ORAL at 00:17

## 2018-09-05 RX ADMIN — FUROSEMIDE 20 MG: 20 TABLET ORAL at 08:30

## 2018-09-05 RX ADMIN — METOCLOPRAMIDE 5 MG: 5 TABLET ORAL at 15:58

## 2018-09-05 RX ADMIN — TOPIRAMATE 100 MG: 100 TABLET, FILM COATED ORAL at 19:39

## 2018-09-05 RX ADMIN — NYSTATIN 100000 UNITS: 100000 SUSPENSION ORAL at 15:48

## 2018-09-05 RX ADMIN — NYSTATIN 100000 UNITS: 100000 SUSPENSION ORAL at 11:57

## 2018-09-05 RX ADMIN — ACETAMINOPHEN 500 MG: 500 TABLET, FILM COATED ORAL at 00:17

## 2018-09-05 RX ADMIN — CYCLOBENZAPRINE HYDROCHLORIDE 10 MG: 5 TABLET, FILM COATED ORAL at 15:58

## 2018-09-05 RX ADMIN — SODIUM BICARBONATE 325 MG: 325 TABLET ORAL at 21:38

## 2018-09-05 RX ADMIN — ACETAMINOPHEN 500 MG: 500 TABLET, FILM COATED ORAL at 06:14

## 2018-09-05 RX ADMIN — POTASSIUM CHLORIDE 10 MEQ: 10 INJECTION, SOLUTION INTRAVENOUS at 09:41

## 2018-09-05 RX ADMIN — HYDROCORTISONE 10 MG: 10 TABLET ORAL at 19:39

## 2018-09-05 RX ADMIN — KETOROLAC TROMETHAMINE 30 MG: 30 INJECTION, SOLUTION INTRAMUSCULAR at 06:14

## 2018-09-05 RX ADMIN — PREGABALIN 150 MG: 75 CAPSULE ORAL at 08:27

## 2018-09-05 RX ADMIN — LEVOTHYROXINE SODIUM 112 MCG: 112 TABLET ORAL at 08:28

## 2018-09-05 RX ADMIN — ASPIRIN 81 MG CHEWABLE TABLET 81 MG: 81 TABLET CHEWABLE at 08:27

## 2018-09-05 RX ADMIN — PANTOPRAZOLE SODIUM 40 MG: 40 TABLET, DELAYED RELEASE ORAL at 19:39

## 2018-09-05 RX ADMIN — ACETAMINOPHEN 1000 MG: 500 TABLET, FILM COATED ORAL at 13:39

## 2018-09-05 RX ADMIN — LIDOCAINE HYDROCHLORIDE 15 ML: 20 SOLUTION ORAL; TOPICAL at 14:45

## 2018-09-05 RX ADMIN — TOPIRAMATE 100 MG: 100 TABLET, FILM COATED ORAL at 08:29

## 2018-09-05 RX ADMIN — PANTOPRAZOLE SODIUM 40 MG: 40 TABLET, DELAYED RELEASE ORAL at 08:29

## 2018-09-05 RX ADMIN — DICYCLOMINE HYDROCHLORIDE 10 MG: 10 CAPSULE ORAL at 19:38

## 2018-09-05 RX ADMIN — KETOROLAC TROMETHAMINE 30 MG: 30 INJECTION, SOLUTION INTRAMUSCULAR at 19:38

## 2018-09-05 RX ADMIN — ACETAMINOPHEN 1000 MG: 500 TABLET, FILM COATED ORAL at 19:38

## 2018-09-05 RX ADMIN — PREGABALIN 150 MG: 75 CAPSULE ORAL at 13:39

## 2018-09-05 RX ADMIN — DULOXETINE HYDROCHLORIDE 60 MG: 60 CAPSULE, DELAYED RELEASE ORAL at 08:30

## 2018-09-05 RX ADMIN — PREGABALIN 150 MG: 75 CAPSULE ORAL at 19:39

## 2018-09-05 RX ADMIN — KETOROLAC TROMETHAMINE 30 MG: 30 INJECTION, SOLUTION INTRAMUSCULAR at 12:06

## 2018-09-05 ASSESSMENT — ACTIVITIES OF DAILY LIVING (ADL)
ADLS_ACUITY_SCORE: 10

## 2018-09-05 NOTE — PROGRESS NOTES
[facility] [place]    Internal Medicine Progress Note - [facility] Service    Main Plans for Today    - Endocrine involved   - NJ ordered for TF   - Monitor blood sugars   - IV toradol PRN   - increase tylenol and schedule   - PT consult   - Bimanual exam with G/C and wetprep    Assessment & Plan   Chantell Kidd is a 52 year old female admitted on 9/3/2018. She has a history of history of chronic pancreatitis s/p pancreatectomy with auto islet transplantation, resultant T1DM, chronic abd pain, GERD, anxiety depression and is admitted for hypoglycemia.      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 Recently needed steroids increased to 20mg BID to keep sugars up. C peptide decreased with elevated blood insulin level consistent with exogenous insulin - although should have been metabolized out of the body at this point, so unclear what type of insulin is being used.    - Endocrine consult, appreciate assistance   - Hypoglycemia protocol   - Continue 10mg BID hydrocortisone   - D50 PRN   - Continue PTA creon   - NJ ordered for TF start     # Abdominal Pain/Nausea  # Headache  CT ab/pel 8/26 showing mild gastroenteritis per read. Pain at baseline per patient.    - Zofran PRN   - Encourage PO intake to maintain sugars   - Tylenol 1g TID   - IV toradol PRN     - Will perform bimanual exam due to possibility of PID    # Lactic acidosis  # Leukocytosis   2.2 on admission --> 0.9 with fluids. WBC 22 on admission. Likely secondary to dehydration and hypoglycemia along with inflammatory response to hypoglycemia. No other sign of systemic infection. No antibiotics at this point, will closely monitor. Low threshold to rescan abdomen due to continued pain and leukocytosis. UA with large LE, no nitrites and few WBC - combined with abdominal pain/flank pain. No dysuria, will not continue to treat, but will monitor. S/p IV  ceftriaxone.       Chronic Medical Problems    - CIERRA: PTA cymbalta   - Hypothyroid: PTA levothyroxine   - Adrenal Insufficiency: continue pta hydrocortisone   - Chronic Pain: continue pta tylenol, simethicone, dicyclomine    # Pain Assessment:  Current Pain Score 9/5/2018   Patient currently in pain? yes   Pain score (0-10) -   Pain location Abdomen   Pain descriptors Sharp   - Chantell is experiencing pain due to abdominal pain. Pain management was discussed and the plan was created in a collaborative fashion.  Chantell's response to the current recommendations: mixed response  - Please see the plan for pain management as documented above    Diet: Combination Diet Regular Diet Adult  Calorie Counts  Snacks/Supplements Adult: Boost Breeze; Between Meals  Adult Formula Drip Feeding: Continuous Nutren 1.5; Nasoduodenal tube; Goal Rate: 40; mL/hr; Medication - Tube Feeding Flush Frequency: At least 15-30 mL water before and after medication administration and with tube clogging; Amount to Send (Nutri...  Fluids: Encourage oral  DVT Prophylaxis: Low Risk/Ambulatory with no VTE prophylaxis indicated  Code Status: Full Code    Disposition Plan   Expected discharge: 2 - 3 days, recommended to prior living arrangement once blood sugar stablized.     Entered: Sonia Severino 09/05/2018, 3:06 PM   Information in the above section will display in the discharge planner report.      The patient's care was discussed with the Attending Physician, Dr. Millard.    Sonia Severino MD  [facility]: 5  Pager: [phone]  Please see sticky note for cross cover information    Interval History   Afebrile. Blood sugars >100 overnight. Was able to eat some mashed potatoes overnight. Still endorsing abdominal pain, similar to chronic pain. Open to TF, but would prefer G-J tube, although understanding her nutrition may not be appropriate yet.     Physical Exam   Vital Signs: Temp: 98.8  F (37.1  C) Temp src: Oral BP: 135/84  Pulse: 71 Heart Rate: 67 Resp: 16 SpO2: 98 % O2 Device: None (Room air)    Weight: 140 lbs 4.8 oz     Gen: NAD, alert, pleasant, cooperative, non-toxic  HEENT: EOMI, no scleral icterus, tracking appropriately  Resp: CTAB, no crackles or wheezes, no increased WOB  Cardiac: RRR, no S3/S4, no M/R/G appreciated  GI: soft, non-tender to moderate palpation, non-distended, normoactive bowel sounds  Ext: WWP, no edema  Neuro: AOx3, CN 2-12 grossly intact, appropriate mentation     Data   Labs and Imaging reviewed in EPIC

## 2018-09-05 NOTE — PROGRESS NOTES
Endocrine Consult Follow Up   Patient: Chantell Kidd   MRN: [id number]  Date of Service: 9/4/2018    Subjective:  She has not been feeling well this morning, mainly because the abdominal pain. She had several episodes of emesis today.     Medications:  Current Facility-Administered Medications   Medication     acetaminophen (TYLENOL) tablet 1,000 mg     alum & mag hydroxide-simethicone (MYLANTA/MAALOX) 200-200-25 MG chewable tablet 1 tablet     amylase-lipase-protease (CREON 12) 84702 units per capsule 12,000-24,000 Units    And     sodium bicarbonate tablet 325 mg     amylase-lipase-protease (CREON 12) 11649 units per capsule 72,000-96,000 Units     aspirin chewable tablet 81 mg     cefTRIAXone (ROCEPHIN) 1 g vial to attach to  mL bag for ADULTS or NS 50 mL bag for PEDS     cyclobenzaprine (FLEXERIL) tablet 10 mg     dextrose 10 % 1,000 mL infusion     glucose gel 15-30 g    Or     dextrose 50 % injection 25-50 mL    Or     glucagon injection 1 mg     dicyclomine (BENTYL) capsule 10 mg     DULoxetine (CYMBALTA) EC capsule 30 mg     DULoxetine (CYMBALTA) EC capsule 60 mg     furosemide (LASIX) tablet 20 mg     glucagon kit 1 mg     hydrocortisone (CORTEF) tablet 10 mg     insulin aspart (NovoLOG) inj (RAPID ACTING)     insulin aspart (NovoLOG) inj (RAPID ACTING)     ketorolac (TORADOL) injection 30 mg     levothyroxine (SYNTHROID/LEVOTHROID) tablet 112 mcg     lidocaine (LMX4) cream     lidocaine (viscous) (XYLOCAINE) 2 % solution 5 mL     lidocaine 1 % 1 mL     linaclotide (LINZESS) capsule 145 mcg     melatonin tablet 1 mg     metoclopramide (REGLAN) tablet 5 mg     naloxone (NARCAN) injection 0.1-0.4 mg     nystatin (MYCOSTATIN) 571100 unit/mL suspension 100,000 Units     ondansetron (ZOFRAN-ODT) ODT tab 4 mg    Or     ondansetron (ZOFRAN) injection 4 mg     pantoprazole (PROTONIX) EC tablet 40 mg     polyethylene glycol (MIRALAX/GLYCOLAX) Packet 17 g     potassium chloride (KLOR-CON) Packet 20-40 mEq      potassium chloride 10 mEq in 100 mL sterile water intermittent infusion (premix)     potassium chloride 20 mEq in 50 mL intermittent infusion     potassium chloride SA (K-DUR/KLOR-CON M) CR tablet 20 mEq     potassium chloride SA (K-DUR/KLOR-CON M) CR tablet 20-40 mEq     pregabalin (LYRICA) capsule 150 mg     prochlorperazine (COMPAZINE) injection 10 mg    Or     prochlorperazine (COMPAZINE) tablet 10 mg    Or     prochlorperazine (COMPAZINE) Suppository 25 mg     senna-docusate (SENOKOT-S;PERICOLACE) 8.6-50 MG per tablet 2 tablet     sodium chloride (PF) 0.9% PF flush 3 mL     sodium chloride (PF) 0.9% PF flush 3 mL     SUMAtriptan (IMITREX) tablet 50 mg     topiramate (TOPAMAX) tablet 100 mg     traZODone (DESYREL) tablet 100 mg         Physical Examination:  Blood pressure 142/83, pulse 69, temperature 98.1  F (36.7  C), temperature source Oral, resp. rate 16, weight 63.6 kg (140 lb 4.8 oz), SpO2 98 %, not currently breastfeeding.  GEN: Awake, alert, no distress.  HEENT: EOMI.  NECK: No thyroid gland enlargement. No cervical or clavicular LAD.   CV: RRR with no murmur.   RESP: CTA bilaterally.   ABD: Soft, non-tender, and non-distended, with normal BS.   EXT: No peripheral edema.   SKIN: Normal skin temperature and turgor with no lesions.   NEURO: Normoactive reflexes. No tremor.     Labs:     Recent Labs  Lab 09/05/18  1553 09/05/18  1117 09/05/18  1009 09/05/18  0718 09/05/18  0627 09/05/18  0356 09/04/18  2349  09/04/18  0632  09/03/18  1841  09/03/18  0855   GLC  --   --   --   --  101*  --   --   --  106*  --  132*  --  124*   BGM 83 99 101* 109*  --  108* 171*  < >  --   < >  --   < >  --    < > = values in this interval not displayed.    9/3/2018 6PM  Plasma glucose 132, insulin  45.6, c-peptide 0.2  Sulfonylurea screening--pending    Assessment:  Chantell Kidd is a 52 year old female with PMHx of chronic pancreatitis s/p TPIAT, nomi-en-Y, and splenectomy in 2012, IDDM on insulin pump, recurrent severe  hypoglycemia, hypothyroidism, BRENDAN, prior GJ, and possible adrenal insufficiency who was recently admitted on 8/26/18 for severe hypoglycemia which thought to be 2/2 exogenous insulin use, alcohol and poor po intake, she presented to the ED again on 9/3/2018 for hypoglycemia episode that led to LOC. Insulin and C-peptide on 9/3 is compatible with exogenous insulin.    # Severe, recurrent hypoglycemia  This episode started after she was placed on the pump on 8/29. She was admitted on 9/3 morning with hypoglycemia. The pump and meter reviewed on 9/5. It seems like the patient was on the pump from 8/29 to 9/3 AM, did not discontinue on 9/2 per reported. She has been on basal rate with very minimal bolus likely from the machine 0.2 units for BG of 160 on 8/29 and 8/30, 1 time/day. Her pump seems to be working normally without unusual basal rate or bolus.   Her pump setting is as below:  Basal   0887-0100: 0.400  1174-1473: 0.500    Bolus  CHO ratio: 1:40    Sensitivity: 100  BG target .  Active insulin time 4 hr    Her meter showed that on the 8/29-9/1 her BGs were good on basal insulin, but then she got sick and unable to eat on 9/2 which led to low BGs. And she continued using insulin pump with the basal rate running. The labs on 9/3 showed Plasma glucose 132, insulin  45.6, c-peptide 0.2 which is compatible with exogenous insulin. Unclear why she had exogenous insulin in her system 18 hour off the pump. Her blood sugars have been in a range off D10 since 9/4 with , and without getting insulin since admission on 9/3 AM.       # IDDM on home insulin pump  Patient with hx of TPIAT with reported beta-cell function, but she is, at times, reliant on daily insulin. She has detectable c-peptide levels in 2/2018. Follows with Dr. Maher in clinic. However, from the labs this admission, it showed that she had exogenous insulin in her system longer and higher than she should be. Here in the hospital, she has not been  given insulin for 48 hours without going into DKA or have elevated BG than 200. She maybe making some intrinsic insulin. Will check insulin, c-peptide and plasma glucose again to confirm that. Also would discuss with  about alternatives treatment for diabetes like SGLT-2 inhibitor that is not going to interfere with the beta-cell.       # Possible secondary adrenal insufficiency  Patient originally diagnosed in 03/2016 with possible opioid-related secondary AI, however this diagnosis is questioned by her primary endocrinologist. She has remained on 10mg hydrocortisone BID since 2016. Will have to re-evaluate if the patient has adrenal insufficiency or not since the hydrocortisone could suppress the pituitary response to hypoglycemia and led to severe hypoglycemia.     Recommendations:  - No basal insulin  - Discontinue insulin pump  - Check plasma glucose, c-peptide and insulin level when start TF (should be checked after start feeding)  - PO intake as tolerated (team is planning to start TF today)  - BG premeals, hs  - Hypoglycemia protocol -- start treatment if BG <60 or symptomatic  - Continue hydrocortisone 10 mg bid   - If BG >200, will treat with low SSI     Endocrine will continue to follow.     Larissa Mckinley MD  Endocrine fellow  [phone]      Endocrine Staff Note    The patient was seen and examined by me with Dr. Mckinley.  Her note details our mutual findings and plan.  Available data support the fact that cause of hypoglycemia is from exogenously administered insulin.  Given the risks associated with severe hypoglycemia, recommend she not be treated with insulin at this time.  As her nutrition improves, BG may go up but with her history of severe hypoglycemia, I would target an A1c of 7.5, AM sugars of 100-150, and post meal sugars <180-200.  Will discuss use of SGLT2 inhibitor with Dr. Maher, her primary endocrinologist, if sugars exceed these levels.    Mitali Ward  MD

## 2018-09-05 NOTE — PROGRESS NOTES
Care Coordinator  D/I: Per 7A dietician, NJT will be placed today and to start enteral feeds continuous--will use Nutren 1.5 at approx 40 ml/hour--cheap formula as pt only has Medicare and will likely be self pay.  I called referral to [facility] [phone] Rose--she will check coverage/and self pay quote  P: Possible discharge Friday  Per chart review--pt has h/o TPAIT with GJT enteral feeds in 2012, and stopped Jan 2018 with GJT. Will likely need refresher teach--PLC is scheduled for Friday, 9/7 @ 12:15pm. Wait for [facility] to call back.  Per Shane at [facility]--Pt will NOT meet criteria for enteral. For self pay of Nutren will be $32.64/day and $189/nursing visit if she is not homebound.

## 2018-09-05 NOTE — PLAN OF CARE
Problem: Patient Care Overview  Goal: Plan of Care/Patient Progress Review  Outcome: Improving    RN:  AVSS, abdominal/back pain controlled with Toradol and Tylenol given x1 with relief, nausea tolerable, no antiemetic given during the shift. Blood glucose 170 and 108, regular Ginger Ale given. Adequate urine output, no BM during the shift. Up ad dylan in the room, will continue to monitor.

## 2018-09-05 NOTE — PROGRESS NOTES
CLINICAL NUTRITION SERVICES - ASSESSMENT NOTE     Nutrition Prescription    RECOMMENDATIONS FOR MDs/PROVIDERS TO ORDER:  PMH of Venessa-en-Y gastric bypass.  Consider bariatric evaluation for reasons for vomiting/PO intolerance.       Malnutrition Status:    Severe malnutrition in the context of chronic illness    Recommendations already ordered by Registered Dietitian (RD):  Calorie counts (9/6-9/8)    Nutren 1.5 @ 10 ml/hr with advancement by 10 ml/hr q 8 hours to goal rate of 40 ml/hr.  This will provide 1440 kcals (27 kcal/kg/day), 65 g PRO (1.2 g/kg/day), 730 mL H2O, 169 g CHO and no Fiber daily.    Ordered Magnesium/Phosphorus for baseline levels.     Check Magnesium, Phosphorus + BMP daily over the next 2-3 days to monitor for refeeding.  Less likely to occur given recent D10 infusion.     Free water of 30 ml q 4 hours for tube patency.  If minimal PO tolerance of liquids, recommend increasing free water flushes to at least 720 ml daily.    Once begin TFs, begin the following pancreatic enzyme regimen:  A) Sodium Bicarb tablet (325 mg), 1 tablet q 4 hrs via Jtube. Administration Instructions: Crush 1 tablet and mix into 15 ml of warm water and use this solution to mix with Creon pancreatic enzymes. DO NOT administer directly into Jtube (to be mixed into TF formula with Creon enzyme - see Creon enzyme order)   B) Creon 12, 1- 2 capsules q 4 hrs via Jtube. Administration Instructions:   --If TF rate is running @ 10-20 ml/hr, administer 1 capsule q 4 hrs;   --If TF rate is running @ 30-40 ml/hr, increase to 2 capsules q 4 hrs.    **Open capsule and empty contents into 15 ml sodium bicarb solution (see sodium bicarb order), let dissolve for about 20-30 minutes and then add this solution to the amount of TF formula hung in TF bag every 4 hrs (i.e., once TF @ goal infusion 40 ml/hr will mix 2 capsules into 160 ml of TF formula every 4 hrs).   Future/Additional Recommendations:  Likely patient would do best from a  "hypoglycemia standpoint with a continuous TF regimen.  Otherwise, could consider a long cycle (x 16-18 hours).       REASON FOR ASSESSMENT  Chantell Kidd is a/an 52 year old female assessed by the dietitian for Provider Order - Registered Dietitian to Assess and Order TF per Medical Nutrition Therapy Protocol    NUTRITION HISTORY  PMH of chronic pancreatitis s/p TP-AIT (1/6/2012) and Venessa-en-Y gastric bypass.     Has a hx of requiring TF on/off since TP surgery.  Most recently stopped TF via G/J tube on 1/3/18 (tube pulled).     Vomits a few times per day.  Her vomiting is random, sometimes with solid foods and sometimes only after drinking water.  Reports that overall, within the last month, has been eating/tolerating ~20% of usual PO intake.  Has had issues with hypoglycemia recently d/t poor PO intake secondary to abdominal pain/vomiting.      CURRENT NUTRITION ORDERS  Diet: Regular  Intake/Tolerance: reports having tolerated pudding and mashed potatoes this admission  Has an order for XRAY FT placement    LABS  Prealbumin 18 (normal, low end)  Vitamin D <46 (normal, 2/1/18)  Vitamin A 0.32 (normal, 2/1/18)  Vitamin B12 1071 (elevated, 2/1/18)  Vitamin E 9.1 (normal, 2/1/18)  Patient takes daily CF MVI (BOOGIE plus)    MEDICATIONS  Creon 12 - 6 to 8 capsules per meal -> low end provides ~1100 units of lipase/kg/meal within recommended dosing of 500-2500 units of lipase/kg/meal  Reglan 5 mg BID PRN for nausea  Protonix 40 mg BID  Miralax PRN  Low sliding scale insulin  IVF with D10 @ 50 ml/hr (9/3-9/4)    ANTHROPOMETRICS  Height: 5'2\"  Most Recent Weight: 64.3 kg (141 lb 11.2 oz)    IBW: 50 kg  BMI: Overweight BMI 25-29.9  Weight History: Weight loss of 4.9 kg (7%) within the last 6 months.   Wt Readings from Last 15 Encounters:   09/03/18 64.3 kg (141 lb 11.2 oz)   08/29/18 65.9 kg (145 lb 3.2 oz)   07/05/18 65.8 kg (145 lb)   05/17/18 66.3 kg (146 lb 3.2 oz)   03/01/18 69.2 kg (152 lb 9.6 oz)   02/01/18 69.7 kg (153 " lb 11.2 oz)   01/09/18 70.1 kg (154 lb 9.6 oz)   12/13/17 67.4 kg (148 lb 9.6 oz)   11/24/17 68.3 kg (150 lb 8 oz)   10/19/17 67.9 kg (149 lb 12.8 oz)   08/30/17 63.5 kg (140 lb)   08/01/17 63.9 kg (140 lb 12.8 oz)   06/10/17 59.9 kg (132 lb)   04/04/17 61.4 kg (135 lb 4.8 oz)   01/19/17 63.7 kg (140 lb 6.4 oz)   Dosing Weight: 54 kg (adj wt based on IBW of 50 kg and actual BW of 64.3 kg)    ASSESSED NUTRITION NEEDS  Estimated Energy Needs: 6346-1479 kcals/day (25 - 30 kcals/kg)  Justification: Maintenance  Estimated Protein Needs: 43-54 grams protein/day (0.8 - 1 grams of pro/kg)+  Justification: Maintenance  Estimated Fluid Needs: 1 mL/kcal   Justification: Maintenance    PHYSICAL FINDINGS  See malnutrition section below.    MALNUTRITION  % Intake: </=50% for >/= 1 month (severe)  % Weight Loss: Up to 10% in 6 months (non-severe)  Subcutaneous Fat Loss: Facial region:  Mild-moderate and Thoracic/intercostal: mild  Muscle Loss: Facial & jaw region:  Mild-moderate, Thoracic region (clavicle, acromium bone, deltoid, trapezius, pectoral): moderate, Upper arm (bicep, tricep): mild-moderate and Posterior calf: moderate  Fluid Accumulation/Edema: None noted (on diuretic)  Malnutrition Diagnosis: Severe malnutrition in the context of chronic illness    NUTRITION DIAGNOSIS  Inadequate oral intake related to vomiting/abdominal pain as evidenced by patient reports eating <25% of usual intake over the last month, weight loss of 7% in 6 months.     INTERVENTIONS  Implementation  Nutrition Education: Provided education on RD role, recommendations above   Enteral Nutrition - Initiate     Goals  Total avg nutritional intake to meet a minimum of 25 kcal/kg and 0.8 g PRO/kg daily (per dosing wt 54 kg).     Monitoring/Evaluation  Progress toward goals will be monitored and evaluated per protocol.    Margot Payton MS, RD, LD  Pager [phone]

## 2018-09-05 NOTE — PLAN OF CARE
Problem: Patient Care Overview  Goal: Plan of Care/Patient Progress Review  Outcome: No Change  /84 (BP Location: Right arm)  Pulse 71  Temp 98.8  F (37.1  C) (Oral)  Resp 16  Wt 63.6 kg (140 lb 4.8 oz)  SpO2 98%  BMI 25.66 kg/m2 VS stable on room air. Patient toradol and heat packs for abdominal/back pain; team notified pain not well controled but tolerable today. No changes made to POC. Patient denied nausea. Urine Output - voiding but not saving. Bowel Function - no reported BM. Nutrition - on regular diet and pt will start tube feeds this evening. Drains - NJT recently placed and waiting for order that tube is verified and okay to use. Activity - up independently in room. , 101, 99. Possible discharge tomorrow. PIV saline locked and pt received 20 mEq IV potassium because pt declined oral pills. All care explained and questions answered, will continue to monitor and will notify team of changes.

## 2018-09-06 LAB
ANION GAP SERPL CALCULATED.3IONS-SCNC: 9 MMOL/L (ref 3–14)
BUN SERPL-MCNC: 18 MG/DL (ref 7–30)
C PEPTIDE SERPL-MCNC: 1.8 NG/ML (ref 0.9–6.9)
C TRACH DNA SPEC QL NAA+PROBE: NEGATIVE
CALCIUM SERPL-MCNC: 8.8 MG/DL (ref 8.5–10.1)
CHLORIDE SERPL-SCNC: 105 MMOL/L (ref 94–109)
CO2 SERPL-SCNC: 26 MMOL/L (ref 20–32)
CREAT SERPL-MCNC: 0.87 MG/DL (ref 0.52–1.04)
GFR SERPL CREATININE-BSD FRML MDRD: 68 ML/MIN/1.7M2
GLUCOSE BLDC GLUCOMTR-MCNC: 120 MG/DL (ref 70–99)
GLUCOSE BLDC GLUCOMTR-MCNC: 128 MG/DL (ref 70–99)
GLUCOSE BLDC GLUCOMTR-MCNC: 130 MG/DL (ref 70–99)
GLUCOSE BLDC GLUCOMTR-MCNC: 137 MG/DL (ref 70–99)
GLUCOSE BLDC GLUCOMTR-MCNC: 138 MG/DL (ref 70–99)
GLUCOSE BLDC GLUCOMTR-MCNC: 140 MG/DL (ref 70–99)
GLUCOSE BLDC GLUCOMTR-MCNC: 147 MG/DL (ref 70–99)
GLUCOSE BLDC GLUCOMTR-MCNC: 150 MG/DL (ref 70–99)
GLUCOSE BLDC GLUCOMTR-MCNC: 164 MG/DL (ref 70–99)
GLUCOSE BLDC GLUCOMTR-MCNC: 165 MG/DL (ref 70–99)
GLUCOSE BLDC GLUCOMTR-MCNC: 172 MG/DL (ref 70–99)
GLUCOSE BLDC GLUCOMTR-MCNC: 190 MG/DL (ref 70–99)
GLUCOSE SERPL-MCNC: 120 MG/DL (ref 70–99)
INSULIN SERPL-ACNC: 9.3 MU/L (ref 3–25)
MAGNESIUM SERPL-MCNC: 2.3 MG/DL (ref 1.6–2.3)
N GONORRHOEA DNA SPEC QL NAA+PROBE: NEGATIVE
PHOSPHATE SERPL-MCNC: 3.8 MG/DL (ref 2.5–4.5)
PHOSPHATE SERPL-MCNC: 4.1 MG/DL (ref 2.5–4.5)
POTASSIUM SERPL-SCNC: 3.8 MMOL/L (ref 3.4–5.3)
SODIUM SERPL-SCNC: 140 MMOL/L (ref 133–144)
SPECIMEN SOURCE: NORMAL
SPECIMEN SOURCE: NORMAL

## 2018-09-06 PROCEDURE — 84681 ASSAY OF C-PEPTIDE: CPT | Performed by: INTERNAL MEDICINE

## 2018-09-06 PROCEDURE — 36415 COLL VENOUS BLD VENIPUNCTURE: CPT | Performed by: INTERNAL MEDICINE

## 2018-09-06 PROCEDURE — 00000146 ZZHCL STATISTIC GLUCOSE BY METER IP

## 2018-09-06 PROCEDURE — 12000026 ZZH R&B TRANSPLANT

## 2018-09-06 PROCEDURE — 84100 ASSAY OF PHOSPHORUS: CPT | Performed by: INTERNAL MEDICINE

## 2018-09-06 PROCEDURE — 40000893 ZZH STATISTIC PT IP EVAL DEFER

## 2018-09-06 PROCEDURE — 25000128 H RX IP 250 OP 636: Performed by: STUDENT IN AN ORGANIZED HEALTH CARE EDUCATION/TRAINING PROGRAM

## 2018-09-06 PROCEDURE — A9270 NON-COVERED ITEM OR SERVICE: HCPCS | Mod: GY | Performed by: STUDENT IN AN ORGANIZED HEALTH CARE EDUCATION/TRAINING PROGRAM

## 2018-09-06 PROCEDURE — A9270 NON-COVERED ITEM OR SERVICE: HCPCS | Mod: GY | Performed by: INTERNAL MEDICINE

## 2018-09-06 PROCEDURE — 83525 ASSAY OF INSULIN: CPT | Performed by: INTERNAL MEDICINE

## 2018-09-06 PROCEDURE — 25000132 ZZH RX MED GY IP 250 OP 250 PS 637: Mod: GY | Performed by: INTERNAL MEDICINE

## 2018-09-06 PROCEDURE — 99233 SBSQ HOSP IP/OBS HIGH 50: CPT | Mod: GC | Performed by: INTERNAL MEDICINE

## 2018-09-06 PROCEDURE — 80048 BASIC METABOLIC PNL TOTAL CA: CPT | Performed by: INTERNAL MEDICINE

## 2018-09-06 PROCEDURE — 25000132 ZZH RX MED GY IP 250 OP 250 PS 637: Mod: GY | Performed by: STUDENT IN AN ORGANIZED HEALTH CARE EDUCATION/TRAINING PROGRAM

## 2018-09-06 PROCEDURE — 83735 ASSAY OF MAGNESIUM: CPT | Performed by: INTERNAL MEDICINE

## 2018-09-06 RX ADMIN — PANCRELIPASE 12000 UNITS: 60000; 12000; 38000 CAPSULE, DELAYED RELEASE PELLETS ORAL at 00:36

## 2018-09-06 RX ADMIN — POTASSIUM CHLORIDE 10 MEQ: 10 INJECTION, SOLUTION INTRAVENOUS at 12:39

## 2018-09-06 RX ADMIN — SENNOSIDES AND DOCUSATE SODIUM 2 TABLET: 8.6; 5 TABLET ORAL at 08:47

## 2018-09-06 RX ADMIN — DICYCLOMINE HYDROCHLORIDE 10 MG: 10 CAPSULE ORAL at 20:07

## 2018-09-06 RX ADMIN — TOPIRAMATE 100 MG: 100 TABLET, FILM COATED ORAL at 08:46

## 2018-09-06 RX ADMIN — PANTOPRAZOLE SODIUM 40 MG: 40 TABLET, DELAYED RELEASE ORAL at 20:07

## 2018-09-06 RX ADMIN — CLOTRIMAZOLE 1 TROCHE: 10 LOZENGE ORAL at 20:07

## 2018-09-06 RX ADMIN — DICYCLOMINE HYDROCHLORIDE 10 MG: 10 CAPSULE ORAL at 16:10

## 2018-09-06 RX ADMIN — KETOROLAC TROMETHAMINE 30 MG: 30 INJECTION, SOLUTION INTRAMUSCULAR at 02:36

## 2018-09-06 RX ADMIN — CLOTRIMAZOLE 1 TROCHE: 10 LOZENGE ORAL at 12:24

## 2018-09-06 RX ADMIN — DULOXETINE HYDROCHLORIDE 30 MG: 30 CAPSULE, DELAYED RELEASE ORAL at 08:47

## 2018-09-06 RX ADMIN — SODIUM BICARBONATE 325 MG: 325 TABLET ORAL at 20:10

## 2018-09-06 RX ADMIN — ACETAMINOPHEN 1000 MG: 500 TABLET, FILM COATED ORAL at 20:07

## 2018-09-06 RX ADMIN — ACETAMINOPHEN 1000 MG: 500 TABLET, FILM COATED ORAL at 14:41

## 2018-09-06 RX ADMIN — KETOROLAC TROMETHAMINE 30 MG: 30 INJECTION, SOLUTION INTRAMUSCULAR at 21:09

## 2018-09-06 RX ADMIN — SODIUM BICARBONATE 325 MG: 325 TABLET ORAL at 00:36

## 2018-09-06 RX ADMIN — ACETAMINOPHEN 1000 MG: 500 TABLET, FILM COATED ORAL at 08:46

## 2018-09-06 RX ADMIN — SODIUM BICARBONATE 325 MG: 325 TABLET ORAL at 16:11

## 2018-09-06 RX ADMIN — PANCRELIPASE 24000 UNITS: 60000; 12000; 38000 CAPSULE, DELAYED RELEASE PELLETS ORAL at 08:51

## 2018-09-06 RX ADMIN — ONDANSETRON 4 MG: 2 INJECTION INTRAMUSCULAR; INTRAVENOUS at 08:40

## 2018-09-06 RX ADMIN — CLOTRIMAZOLE 1 TROCHE: 10 LOZENGE ORAL at 16:11

## 2018-09-06 RX ADMIN — POTASSIUM CHLORIDE 10 MEQ: 10 INJECTION, SOLUTION INTRAVENOUS at 10:45

## 2018-09-06 RX ADMIN — HYDROCORTISONE 10 MG: 10 TABLET ORAL at 08:48

## 2018-09-06 RX ADMIN — PANCRELIPASE 12000 UNITS: 60000; 12000; 38000 CAPSULE, DELAYED RELEASE PELLETS ORAL at 04:34

## 2018-09-06 RX ADMIN — TOPIRAMATE 100 MG: 100 TABLET, FILM COATED ORAL at 20:07

## 2018-09-06 RX ADMIN — NYSTATIN 100000 UNITS: 100000 SUSPENSION ORAL at 08:49

## 2018-09-06 RX ADMIN — KETOROLAC TROMETHAMINE 30 MG: 30 INJECTION, SOLUTION INTRAMUSCULAR at 08:50

## 2018-09-06 RX ADMIN — PREGABALIN 150 MG: 75 CAPSULE ORAL at 08:45

## 2018-09-06 RX ADMIN — FUROSEMIDE 20 MG: 20 TABLET ORAL at 20:08

## 2018-09-06 RX ADMIN — PANTOPRAZOLE SODIUM 40 MG: 40 TABLET, DELAYED RELEASE ORAL at 08:47

## 2018-09-06 RX ADMIN — LINACLOTIDE 145 MCG: 145 CAPSULE, GELATIN COATED ORAL at 08:48

## 2018-09-06 RX ADMIN — SODIUM BICARBONATE 325 MG: 325 TABLET ORAL at 12:23

## 2018-09-06 RX ADMIN — FUROSEMIDE 20 MG: 20 TABLET ORAL at 08:48

## 2018-09-06 RX ADMIN — PANCRELIPASE 24000 UNITS: 60000; 12000; 38000 CAPSULE, DELAYED RELEASE PELLETS ORAL at 12:24

## 2018-09-06 RX ADMIN — LEVOTHYROXINE SODIUM 112 MCG: 112 TABLET ORAL at 08:48

## 2018-09-06 RX ADMIN — PREGABALIN 150 MG: 75 CAPSULE ORAL at 14:40

## 2018-09-06 RX ADMIN — PREGABALIN 150 MG: 75 CAPSULE ORAL at 20:07

## 2018-09-06 RX ADMIN — ASPIRIN 81 MG CHEWABLE TABLET 81 MG: 81 TABLET CHEWABLE at 08:48

## 2018-09-06 RX ADMIN — CEFTRIAXONE 1 G: 1 INJECTION, POWDER, FOR SOLUTION INTRAMUSCULAR; INTRAVENOUS at 17:12

## 2018-09-06 RX ADMIN — DICLOFENAC EPOLAMINE 1 PATCH: 0.01 PATCH TOPICAL at 14:41

## 2018-09-06 RX ADMIN — CYCLOBENZAPRINE HYDROCHLORIDE 10 MG: 5 TABLET, FILM COATED ORAL at 14:45

## 2018-09-06 RX ADMIN — HYDROCORTISONE 10 MG: 10 TABLET ORAL at 20:08

## 2018-09-06 RX ADMIN — SODIUM BICARBONATE 325 MG: 325 TABLET ORAL at 04:34

## 2018-09-06 RX ADMIN — DICYCLOMINE HYDROCHLORIDE 10 MG: 10 CAPSULE ORAL at 12:25

## 2018-09-06 RX ADMIN — ONDANSETRON 4 MG: 2 INJECTION INTRAMUSCULAR; INTRAVENOUS at 21:09

## 2018-09-06 RX ADMIN — SODIUM BICARBONATE 325 MG: 325 TABLET ORAL at 08:52

## 2018-09-06 RX ADMIN — ONDANSETRON 4 MG: 2 INJECTION INTRAMUSCULAR; INTRAVENOUS at 15:13

## 2018-09-06 RX ADMIN — DULOXETINE HYDROCHLORIDE 60 MG: 60 CAPSULE, DELAYED RELEASE ORAL at 08:46

## 2018-09-06 RX ADMIN — PANCRELIPASE 24000 UNITS: 60000; 12000; 38000 CAPSULE, DELAYED RELEASE PELLETS ORAL at 16:11

## 2018-09-06 RX ADMIN — DICYCLOMINE HYDROCHLORIDE 10 MG: 10 CAPSULE ORAL at 08:47

## 2018-09-06 RX ADMIN — PANCRELIPASE 24000 UNITS: 60000; 12000; 38000 CAPSULE, DELAYED RELEASE PELLETS ORAL at 20:10

## 2018-09-06 ASSESSMENT — ACTIVITIES OF DAILY LIVING (ADL)
ADLS_ACUITY_SCORE: 10

## 2018-09-06 NOTE — DISCHARGE INSTRUCTIONS
________________________________________________________  Discharge RN please fax discharge orders to : [facility]  ________________________________________________________

## 2018-09-06 NOTE — PLAN OF CARE
Problem: Patient Care Overview  Goal: Plan of Care/Patient Progress Review  PT 7A: cancel and defer - per chart review and conversation with pt, pt up independently in room and in hallways. Reports ambulating ~ 1000' with unilateral IV pole support earlier this AM without difficulty; denies concern with return home or IP PT needs at this time. Pt reports she lives at home with her spouse and son, there is always someone home with her. Recommend OP PT once discharged to improve balance, functional strength and endurance; and assistance at home as needed. PT to complete orders and sign off, please reconsult if status changes.

## 2018-09-06 NOTE — PLAN OF CARE
Problem: Patient Care Overview  Goal: Plan of Care/Patient Progress Review  Outcome: No Change  /86 (BP Location: Right arm)  Pulse 84  Temp 98.7  F (37.1  C) (Oral)  Resp 18  Wt 64 kg (141 lb 3.2 oz)  SpO2 94%  BMI 25.83 kg/m2 VS stable on room air. , 120, 190; no insulin needed per MAR. Patient reported uncontrolled pain and MD came to bedside to discuss POC. Pt started on new pain patches and using aqua k pad. Pt received toradol x 2 and on scheduled tylenol for pain. Patient zofran x 2 for nausea and pt reported one emesis this morning. Urine Output - voiding adequately. Bowel Function - no BM reported. Nutrition - on regular diet with no appetite and tube feeds at 30 ml/hr (not goal but team requesting to leave pt at 30 ml/hr remainder of day and will assess tomorrow). Drains - NJT with tube feeds. Activity - up independently in room and halls. All care explained and questions answered, will continue to monitor and will notify team of changes.

## 2018-09-06 NOTE — PROGRESS NOTES
Care Coordinator  D/I: [facility] enteral specialist Anaya is looking at pt and her dx and Medicare criteria again and I have sent info to [facility] Ph: [phone] #1 Olivia/Fax: [phone] and updated her and fax'd info for her to look and see if she can get her covered under Medicare rules or if not, give me a self pay quote for supplies.  P: Wait for [facility] and [facility] to call back--talk with pt--PLC is scheduled for 9/7 @ 12:15pm.  Refax'd notes after Dr Millard [facility]--self pay quote is $750/month or $24-25/day) and they have the Medicare bid--I informed Maria Garrido to cancel her. Per  Dr Millard, pt is still not tolerating increase in TF today.  I have met with Chantell and she is very pleasant. She confirmed that she only has Medicare. She has used [facility] in 2016 and does not wish to use them again and she also used [facility] and would be happy to use them  Again--I informed her d/t Medicare BID have to [facility] and she agrees. [employer] address and phone numbers are correct.  She agrees with PLC 9/7 @ 12:15pm and says she will be able to be independent with enteral at home and will not need HHN visits--just supplies--she would like all supplies that is needed delivered (even though she still has some at home).  A: possible discharge in 1-2 days  P: will keep Olivia at [facility] informed on discharge plan--wait to hear if supplies will be covered by Medicare and inform Chantell.

## 2018-09-06 NOTE — PLAN OF CARE
Problem: Patient Care Overview  Goal: Plan of Care/Patient Progress Review  Outcome: Improving    RN:  AVSS, abdominal pain controlled with Toradol and Tylenol. Patient on regular diet. TF@20cc/hr, to be advance 10cc/hr every 8hours to goal rate of 40cc/hr, blood glucose 172, 165 and 140. Adequate urine output, no BM during the shift, up ad dylan in the room. Patient resting between cares, will continue to monitor.

## 2018-09-07 VITALS
BODY MASS INDEX: 25.83 KG/M2 | DIASTOLIC BLOOD PRESSURE: 73 MMHG | SYSTOLIC BLOOD PRESSURE: 110 MMHG | OXYGEN SATURATION: 98 % | WEIGHT: 141.2 LBS | HEART RATE: 82 BPM | RESPIRATION RATE: 16 BRPM | TEMPERATURE: 98.2 F

## 2018-09-07 LAB
ANION GAP SERPL CALCULATED.3IONS-SCNC: 9 MMOL/L (ref 3–14)
BUN SERPL-MCNC: 18 MG/DL (ref 7–30)
C PEPTIDE SERPL-MCNC: 2.8 NG/ML (ref 0.9–6.9)
CALCIUM SERPL-MCNC: 9.1 MG/DL (ref 8.5–10.1)
CHLORIDE SERPL-SCNC: 102 MMOL/L (ref 94–109)
CO2 SERPL-SCNC: 29 MMOL/L (ref 20–32)
CREAT SERPL-MCNC: 1 MG/DL (ref 0.52–1.04)
GFR SERPL CREATININE-BSD FRML MDRD: 58 ML/MIN/1.7M2
GLUCOSE BLDC GLUCOMTR-MCNC: 127 MG/DL (ref 70–99)
GLUCOSE BLDC GLUCOMTR-MCNC: 136 MG/DL (ref 70–99)
GLUCOSE BLDC GLUCOMTR-MCNC: 154 MG/DL (ref 70–99)
GLUCOSE BLDC GLUCOMTR-MCNC: 160 MG/DL (ref 70–99)
GLUCOSE BLDC GLUCOMTR-MCNC: 162 MG/DL (ref 70–99)
GLUCOSE BLDC GLUCOMTR-MCNC: 165 MG/DL (ref 70–99)
GLUCOSE BLDC GLUCOMTR-MCNC: 184 MG/DL (ref 70–99)
GLUCOSE BLDC GLUCOMTR-MCNC: 208 MG/DL (ref 70–99)
GLUCOSE BLDC GLUCOMTR-MCNC: 238 MG/DL (ref 70–99)
GLUCOSE SERPL-MCNC: 118 MG/DL (ref 70–99)
MAGNESIUM SERPL-MCNC: 2.5 MG/DL (ref 1.6–2.3)
PHOSPHATE SERPL-MCNC: 4.2 MG/DL (ref 2.5–4.5)
POTASSIUM SERPL-SCNC: 3.7 MMOL/L (ref 3.4–5.3)
SODIUM SERPL-SCNC: 140 MMOL/L (ref 133–144)

## 2018-09-07 PROCEDURE — A9270 NON-COVERED ITEM OR SERVICE: HCPCS | Mod: GY | Performed by: STUDENT IN AN ORGANIZED HEALTH CARE EDUCATION/TRAINING PROGRAM

## 2018-09-07 PROCEDURE — 80048 BASIC METABOLIC PNL TOTAL CA: CPT | Performed by: INTERNAL MEDICINE

## 2018-09-07 PROCEDURE — 00000146 ZZHCL STATISTIC GLUCOSE BY METER IP

## 2018-09-07 PROCEDURE — 40000556 ZZH STATISTIC PERIPHERAL IV START W US GUIDANCE

## 2018-09-07 PROCEDURE — 36415 COLL VENOUS BLD VENIPUNCTURE: CPT | Performed by: INTERNAL MEDICINE

## 2018-09-07 PROCEDURE — 25000132 ZZH RX MED GY IP 250 OP 250 PS 637: Mod: GY | Performed by: STUDENT IN AN ORGANIZED HEALTH CARE EDUCATION/TRAINING PROGRAM

## 2018-09-07 PROCEDURE — 84681 ASSAY OF C-PEPTIDE: CPT | Performed by: INTERNAL MEDICINE

## 2018-09-07 PROCEDURE — 25000128 H RX IP 250 OP 636: Performed by: STUDENT IN AN ORGANIZED HEALTH CARE EDUCATION/TRAINING PROGRAM

## 2018-09-07 PROCEDURE — 25000132 ZZH RX MED GY IP 250 OP 250 PS 637: Mod: GY | Performed by: INTERNAL MEDICINE

## 2018-09-07 PROCEDURE — 99239 HOSP IP/OBS DSCHRG MGMT >30: CPT | Mod: GC | Performed by: INTERNAL MEDICINE

## 2018-09-07 PROCEDURE — 84100 ASSAY OF PHOSPHORUS: CPT | Performed by: INTERNAL MEDICINE

## 2018-09-07 PROCEDURE — A9270 NON-COVERED ITEM OR SERVICE: HCPCS | Mod: GY | Performed by: INTERNAL MEDICINE

## 2018-09-07 PROCEDURE — 83735 ASSAY OF MAGNESIUM: CPT | Performed by: INTERNAL MEDICINE

## 2018-09-07 PROCEDURE — 25000131 ZZH RX MED GY IP 250 OP 636 PS 637: Mod: GY | Performed by: STUDENT IN AN ORGANIZED HEALTH CARE EDUCATION/TRAINING PROGRAM

## 2018-09-07 RX ADMIN — LEVOTHYROXINE SODIUM 112 MCG: 112 TABLET ORAL at 08:19

## 2018-09-07 RX ADMIN — SENNOSIDES AND DOCUSATE SODIUM 2 TABLET: 8.6; 5 TABLET ORAL at 08:19

## 2018-09-07 RX ADMIN — PANTOPRAZOLE SODIUM 40 MG: 40 TABLET, DELAYED RELEASE ORAL at 08:18

## 2018-09-07 RX ADMIN — POTASSIUM CHLORIDE 10 MEQ: 10 INJECTION, SOLUTION INTRAVENOUS at 12:19

## 2018-09-07 RX ADMIN — PROCHLORPERAZINE MALEATE 10 MG: 5 TABLET, FILM COATED ORAL at 14:14

## 2018-09-07 RX ADMIN — HYDROCORTISONE 10 MG: 10 TABLET ORAL at 08:16

## 2018-09-07 RX ADMIN — PANCRELIPASE 24000 UNITS: 60000; 12000; 38000 CAPSULE, DELAYED RELEASE PELLETS ORAL at 04:20

## 2018-09-07 RX ADMIN — INSULIN ASPART 1 UNITS: 100 INJECTION, SOLUTION INTRAVENOUS; SUBCUTANEOUS at 16:15

## 2018-09-07 RX ADMIN — LINACLOTIDE 145 MCG: 145 CAPSULE, GELATIN COATED ORAL at 08:20

## 2018-09-07 RX ADMIN — DICYCLOMINE HYDROCHLORIDE 10 MG: 10 CAPSULE ORAL at 08:18

## 2018-09-07 RX ADMIN — SODIUM BICARBONATE 325 MG: 325 TABLET ORAL at 11:22

## 2018-09-07 RX ADMIN — CLOTRIMAZOLE 1 TROCHE: 10 LOZENGE ORAL at 08:17

## 2018-09-07 RX ADMIN — ASPIRIN 81 MG CHEWABLE TABLET 81 MG: 81 TABLET CHEWABLE at 08:19

## 2018-09-07 RX ADMIN — DULOXETINE HYDROCHLORIDE 30 MG: 30 CAPSULE, DELAYED RELEASE ORAL at 08:18

## 2018-09-07 RX ADMIN — ACETAMINOPHEN 1000 MG: 500 TABLET, FILM COATED ORAL at 08:16

## 2018-09-07 RX ADMIN — ACETAMINOPHEN 1000 MG: 500 TABLET, FILM COATED ORAL at 14:00

## 2018-09-07 RX ADMIN — KETOROLAC TROMETHAMINE 30 MG: 30 INJECTION, SOLUTION INTRAMUSCULAR at 05:35

## 2018-09-07 RX ADMIN — SODIUM BICARBONATE 325 MG: 325 TABLET ORAL at 04:20

## 2018-09-07 RX ADMIN — PANCRELIPASE 24000 UNITS: 60000; 12000; 38000 CAPSULE, DELAYED RELEASE PELLETS ORAL at 08:17

## 2018-09-07 RX ADMIN — PROCHLORPERAZINE EDISYLATE 10 MG: 5 INJECTION INTRAMUSCULAR; INTRAVENOUS at 08:21

## 2018-09-07 RX ADMIN — PROCHLORPERAZINE EDISYLATE 10 MG: 5 INJECTION INTRAMUSCULAR; INTRAVENOUS at 00:29

## 2018-09-07 RX ADMIN — POTASSIUM CHLORIDE 10 MEQ: 10 INJECTION, SOLUTION INTRAVENOUS at 10:34

## 2018-09-07 RX ADMIN — PREGABALIN 150 MG: 75 CAPSULE ORAL at 08:20

## 2018-09-07 RX ADMIN — CLOTRIMAZOLE 1 TROCHE: 10 LOZENGE ORAL at 16:08

## 2018-09-07 RX ADMIN — SODIUM BICARBONATE 325 MG: 325 TABLET ORAL at 08:17

## 2018-09-07 RX ADMIN — DICYCLOMINE HYDROCHLORIDE 10 MG: 10 CAPSULE ORAL at 11:22

## 2018-09-07 RX ADMIN — PANCRELIPASE 24000 UNITS: 60000; 12000; 38000 CAPSULE, DELAYED RELEASE PELLETS ORAL at 00:30

## 2018-09-07 RX ADMIN — SODIUM BICARBONATE 325 MG: 325 TABLET ORAL at 00:30

## 2018-09-07 RX ADMIN — DULOXETINE HYDROCHLORIDE 60 MG: 60 CAPSULE, DELAYED RELEASE ORAL at 08:17

## 2018-09-07 RX ADMIN — PREGABALIN 150 MG: 75 CAPSULE ORAL at 14:00

## 2018-09-07 RX ADMIN — CLOTRIMAZOLE 1 TROCHE: 10 LOZENGE ORAL at 11:22

## 2018-09-07 RX ADMIN — DICYCLOMINE HYDROCHLORIDE 10 MG: 10 CAPSULE ORAL at 16:07

## 2018-09-07 RX ADMIN — PANCRELIPASE 24000 UNITS: 60000; 12000; 38000 CAPSULE, DELAYED RELEASE PELLETS ORAL at 11:22

## 2018-09-07 RX ADMIN — FUROSEMIDE 20 MG: 20 TABLET ORAL at 08:20

## 2018-09-07 RX ADMIN — SODIUM BICARBONATE 325 MG: 325 TABLET ORAL at 16:07

## 2018-09-07 RX ADMIN — ONDANSETRON 4 MG: 2 INJECTION INTRAMUSCULAR; INTRAVENOUS at 11:43

## 2018-09-07 RX ADMIN — PANCRELIPASE 2 CAPSULE: 60000; 12000; 38000 CAPSULE, DELAYED RELEASE PELLETS ORAL at 16:08

## 2018-09-07 RX ADMIN — KETOROLAC TROMETHAMINE 30 MG: 30 INJECTION, SOLUTION INTRAMUSCULAR at 11:28

## 2018-09-07 RX ADMIN — TOPIRAMATE 100 MG: 100 TABLET, FILM COATED ORAL at 08:18

## 2018-09-07 ASSESSMENT — ACTIVITIES OF DAILY LIVING (ADL)
ADLS_ACUITY_SCORE: 10

## 2018-09-07 NOTE — PROGRESS NOTES
Care Coordinator  D/I: River Baker--[place] manician--Medicare WILL approve her enteral supplies/formula up to 80% of the cost--20% pt responsible for will now be approx $70/month. Tashi to come at 2pm today and bring all enteral supplies for potential discharge tomorrow.  I have informed Chantell and she is very happy--she had PLC @ 12:15pm for refresher then Tashi was going to go over info again.  P: [place] dietician only available on Monday-Friday.  I have test paged Dr Millard with this info.

## 2018-09-07 NOTE — PLAN OF CARE
Problem: Patient Care Overview  Goal: Plan of Care/Patient Progress Review  Outcome: No Change  /89  Pulse 74  Temp 98.1  F (36.7  C) (Oral)  Resp 16  Wt 64 kg (141 lb 3.2 oz)  SpO2 94%  BMI 25.83 kg/m2     VS: Stable on room air. B, 127, 184, and 168; no insulin needed per orders. Pain: pt received toradol x 1 and scheduled tylenol. Nausea: compazine IV x 1 and pt encouraged to take PO compazine this afternoon when she agreed to try. Zofran IV x 1. Diet: regular diet with no appetite. Pt also on continuous tube feeds now at goal of 40 ml/hr (increased around noon today). LDA: PIV saline locked Pt received 20 mEq IV potassium as a replacement for daily oral potassium. Pt stated oral potassium tablets are too large to swallow and declined potassium chloride powder. GI/: voiding spontaneously but denied having a BM today. Skin: intact. Mobility: up independently. Education: pt met with [facility] for tube feeding teaching and now working with care coordinator to set up home tube feeding pump. Plan: plan to discharge home this weekend. All care explained and questions answered. Will continue to monitor and notify team of changes.

## 2018-09-07 NOTE — PLAN OF CARE
Problem: Patient Care Overview  Goal: Plan of Care/Patient Progress Review  Outcome: No Change  /71 (BP Location: Right arm)  Pulse 74  Temp 97.7  F (36.5  C) (Oral)  Resp 16  Wt 64 kg (141 lb 3.2 oz)  SpO2 97%  BMI 25.83 kg/m2. Vitals stable on RA. BG range: 154-208, checked q 2 hours per order. Pt. c/o abdominal & back pain & given prn Toradol x1. Nausea controlled with IV Compazine x1, no emesis.  Pt. up ad dylan.  Voided adequate amounts, no stools this shift. Tube feeds at 30cc/hour via NJT & tolerating fair.  2 PIVs saline locked. Pt. Slept well between cares.  Continue to monitor & treat per POC & notify team with changes.

## 2018-09-07 NOTE — PROGRESS NOTES
[facility], [place]    Internal Medicine Progress Note - [facility] Service    Main Plans for Today    - slowed advancement of TF due to abdominal pain   - started clotrimazole trouches for thrush   - diclofenac patches for back pain   - PT consult    Assessment & Plan   Chantell Kidd is a 52 year old female admitted on 9/3/2018. She has a history of history of chronic pancreatitis s/p pancreatectomy with auto islet transplantation, resultant T1DM, chronic abd pain, GERD, anxiety depression and is admitted for hypoglycemia.      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 Recently needed steroids increased to 20mg BID to keep sugars up. C peptide decreased with elevated blood insulin level consistent with exogenous insulin - although should have been metabolized out of the body at this point, so unclear what type of insulin is being used.    - Endocrine consult, appreciate assistance   - Hypoglycemia protocol   - Continue 10mg BID hydrocortisone   - D50 PRN   - Continue PTA creon   - TF at 30ml/h and developed emesis.  Will hold at this rate and try to increase to goal tomorrow AM if no further emesis.      # back pain  Likely musculoskeletal given patient's history and prolonged hospitalization  - PT, encourage ambulation  - diclofenac patches to affected area.  - heat pads    # Abdominal Pain/Nausea  # Headache  CT ab/pel 8/26 showing mild gastroenteritis per read. Pain at baseline per patient.    - Zofran PRN   - Encourage PO intake to maintain sugars   - Tylenol 1g TID   - IV toradol PRN      # Lactic acidosis  # Leukocytosis   2.2 on admission --> 0.9 with fluids. WBC 22 on admission. Likely secondary to dehydration and hypoglycemia along with inflammatory response to hypoglycemia. No other sign of systemic infection. No antibiotics at this point, will closely monitor. Low threshold to rescan abdomen due  to continued pain and leukocytosis. UA with large LE, no nitrites and few WBC - combined with abdominal pain/flank pain. No dysuria, will not continue to treat, but will monitor. S/p IV ceftriaxone.       Chronic Medical Problems    - CIERRA: PTA cymbalta   - Hypothyroid: PTA levothyroxine   - Adrenal Insufficiency: continue pta hydrocortisone   - Chronic Pain: continue pta tylenol, simethicone, dicyclomine    # Pain Assessment:  Current Pain Score 9/6/2018   Patient currently in pain? yes   Pain score (0-10) -   Pain location Abdomen   Pain descriptors Sharp;Throbbing;Constant   - Chantell is experiencing pain due to abdominal pain. Pain management was discussed and the plan was created in a collaborative fashion.  Chantell's response to the current recommendations: mixed response  - Please see the plan for pain management as documented above    Diet: Combination Diet Regular Diet Adult  Calorie Counts  Snacks/Supplements Adult: Boost Breeze; Between Meals  Diet  Adult Formula Drip Feeding: Continuous Nutren 1.5; Nasoduodenal tube; Goal Rate: 40; mL/hr; Medication - Tube Feeding Flush Frequency: At least 15-30 mL water before and after medication administration and with tube clogging; Amount to Send (Nutri...  Fluids: Encourage oral  DVT Prophylaxis: Low Risk/Ambulatory with no VTE prophylaxis indicated  Code Status: Full Code    Disposition Plan   Expected discharge: 1-2 days, recommended to prior living arrangement once blood sugar stablized and tolerating tube feeds without significant emesis or pain worse from baseline.     Entered: Leonard Millard 09/06/2018, 8:26 PM   Information in the above section will display in the discharge planner report.      The patient's care was discussed with the Bedside Nurse, Care Coordinator/ and Patient.    Leonard Millard MD  [facility]: 5  Please see sticky note for cross cover information    Interval History   Afebrile. Blood sugars >100  overnight. Endorses greatly worsened crampy abdominal pain, and this tracks with the timing of increased TF rates. One episode of NBNB emesis this AM.  No current nausea.  No diarrhea. Endorses low back pain as well, improves with positioning. No headache, no vision changes. No chest pain, no SOB.     Physical Exam   Vital Signs: Temp: 97.5  F (36.4  C) Temp src: Axillary BP: 136/88 Pulse: 74 Heart Rate: 75 Resp: 18 SpO2: 94 % O2 Device: None (Room air)    Weight: 141 lbs 3.2 oz     Gen: NAD, alert, pleasant, cooperative, non-toxic  HEENT: EOMI, no scleral icterus, tracking appropriately  Resp: CTAB, no crackles or wheezes, no increased WOB  Cardiac: RRR, no S3/S4, no M/R/G appreciated  GI: soft, non-tender to moderate palpation, non-distended, normoactive bowel sounds  Ext: WWP, no edema  Neuro: AOx3, CN 2-12 grossly intact, appropriate mentation     Data   Labs and Imaging reviewed in EPIC

## 2018-09-07 NOTE — PLAN OF CARE
09/07/18 1320     Roxanne Lala-Registered Nurse (Nursing)  9/7/18 I saw the patient in the room for PLC TEN education.Patient has done G-J Yogi pump feeds in the past.Patient correctly returned all Yogi pump skills per written material using PLC supplies and pump.I also reviewed medications and water flushes via NJ.Patient asked a few questions,able to answer teach back,and verbalized understanding of cares.Valmadhav will be here today at 2 PM with supplies and another education session.Please continue to reinforce information.  Written material given and reviewed:NJ,Yogi Pump,Dual Bag

## 2018-09-07 NOTE — PLAN OF CARE
Problem: Patient Care Overview  Goal: Plan of Care/Patient Progress Review  Outcome: No Change  6413-2409: Afebrile. OVSS on RA. Pt complaining of lower back pain and abdominal pain this shift, pain somewhat well controlled with patients scheduled doses of Tylenol and Lyrica along with PRN Toradol given x1. Pt stated that the Diclofenac patches do not provide relief and did not want a new patch placed this evening. Pt on regular diet along with continuous tube feeds and calorie counts, pt continues to have poor PO intake, pt complaining of some nausea, PRN Zofran given x1. BS checks every 2 hours. BS check around dinner was 138, no insulin needed per order parameters. BS check around 1900 was 130. BS check around bedtime was 137, no insulin needed per order parameters. PIV L and R Forearm, both SL. Pt did receive her scheduled dose of Rocephin this shift. NJ tube with continuous tube feeds infusing at 30 mL/hr with 30 mL water flushes every 4 hours. Pt voiding adequate amounts, saving via hat in the bathroom. Per pt report she had one small BM this shift, pt is also passing flatus. Pt up independently. Call light in reach. Will continue to monitor and follow plan of care.

## 2018-09-07 NOTE — PROGRESS NOTES
Endocrine Consult Follow Up   Patient: Chantell Kidd   MRN: [id number]  Date of Service: 9/7/2018    Subjective:  She is feeling better today. Tolerating TF well. TF rate now up to 40ml/hr which is the goal today. No n/v.     Medications:  Current Facility-Administered Medications   Medication     acetaminophen (TYLENOL) tablet 1,000 mg     alum & mag hydroxide-simethicone (MYLANTA/MAALOX) 200-200-25 MG chewable tablet 1 tablet     amylase-lipase-protease (CREON 12) 97317 units per capsule 12,000-24,000 Units    And     sodium bicarbonate tablet 325 mg     amylase-lipase-protease (CREON 12) 08250 units per capsule 72,000-96,000 Units     aspirin chewable tablet 81 mg     cefTRIAXone (ROCEPHIN) 1 g vial to attach to  mL bag for ADULTS or NS 50 mL bag for PEDS     clotrimazole (MYCELEX) lozenge 1 Brenda     cyclobenzaprine (FLEXERIL) tablet 10 mg     dextrose 10 % 1,000 mL infusion     glucose gel 15-30 g    Or     dextrose 50 % injection 25-50 mL    Or     glucagon injection 1 mg     diclofenac (FLECTOR) 1.3 % Patch 1 patch    And     diclofenac (FLECTOR) Patch in Place    And     diclofenac (FLECTOR) patch REMOVAL     dicyclomine (BENTYL) capsule 10 mg     DULoxetine (CYMBALTA) EC capsule 30 mg     DULoxetine (CYMBALTA) EC capsule 60 mg     furosemide (LASIX) tablet 20 mg     glucagon kit 1 mg     hydrocortisone (CORTEF) tablet 10 mg     insulin aspart (NovoLOG) inj (RAPID ACTING)     insulin aspart (NovoLOG) inj (RAPID ACTING)     ketorolac (TORADOL) injection 30 mg     levothyroxine (SYNTHROID/LEVOTHROID) tablet 112 mcg     lidocaine (LMX4) cream     lidocaine (viscous) (XYLOCAINE) 2 % solution 5 mL     lidocaine 1 % 1 mL     linaclotide (LINZESS) capsule 145 mcg     melatonin tablet 1 mg     metoclopramide (REGLAN) tablet 5 mg     naloxone (NARCAN) injection 0.1-0.4 mg     ondansetron (ZOFRAN-ODT) ODT tab 4 mg    Or     ondansetron (ZOFRAN) injection 4 mg     pantoprazole (PROTONIX) EC tablet 40 mg      polyethylene glycol (MIRALAX/GLYCOLAX) Packet 17 g     potassium chloride (KLOR-CON) Packet 20-40 mEq     potassium chloride 10 mEq in 100 mL sterile water intermittent infusion (premix)     potassium chloride 20 mEq in 50 mL intermittent infusion     potassium chloride SA (K-DUR/KLOR-CON M) CR tablet 20 mEq     potassium chloride SA (K-DUR/KLOR-CON M) CR tablet 20-40 mEq     pregabalin (LYRICA) capsule 150 mg     prochlorperazine (COMPAZINE) injection 10 mg    Or     prochlorperazine (COMPAZINE) tablet 10 mg    Or     prochlorperazine (COMPAZINE) Suppository 25 mg     senna-docusate (SENOKOT-S;PERICOLACE) 8.6-50 MG per tablet 2 tablet     sodium chloride (PF) 0.9% PF flush 3 mL     sodium chloride (PF) 0.9% PF flush 3 mL     SUMAtriptan (IMITREX) tablet 50 mg     topiramate (TOPAMAX) tablet 100 mg     traZODone (DESYREL) tablet 100 mg         Physical Examination:  Blood pressure 131/89, pulse 74, temperature 98.1  F (36.7  C), temperature source Oral, resp. rate 16, weight 64 kg (141 lb 3.2 oz), SpO2 94 %, not currently breastfeeding.  GEN: Awake, alert, no distress.  HEENT: EOMI. NJ in placed.  NECK: No thyroid gland enlargement. No cervical or clavicular LAD.   CV: RRR with no murmur.   RESP: CTA bilaterally.   ABD: Soft, non-tender, and non-distended, with normal BS.   EXT: No peripheral edema.   SKIN: Normal skin temperature and turgor with no lesions.   NEURO: Normoactive reflexes. No tremor.     Labs:     Recent Labs  Lab 09/07/18  1359 09/07/18  1156 09/07/18  0952 09/07/18  0759 09/07/18  0655 09/07/18  0604 09/07/18  0419  09/06/18  0635  09/05/18  0627  09/04/18  0632  09/03/18  1841  09/03/18  0855   GLC  --   --   --   --  118*  --   --   --  120*  --  101*  --  106*  --  132*  --  124*   * 184* 127* 136*  --  154* 160*  < >  --   < >  --   < >  --   < >  --   < >  --    < > = values in this interval not displayed.    9/3/2018 6PM  Plasma glucose 132, insulin  45.6, c-peptide 0.2  Sulfonylurea  screening--pending    9/6/2018 8 AM  C-peptide 1.8  Insulin 9.3  POC glucose 120    9/7/2018 7 AM  C-peptide 2.8  POC [id number]    Assessment:  Chantell Kidd is a 52 year old female with PMHx of chronic pancreatitis s/p TPIAT, nomi-en-Y, and splenectomy in 2012, IDDM on insulin pump, recurrent severe hypoglycemia, hypothyroidism, BRENDAN, prior GJ, and possible adrenal insufficiency who was recently admitted on 8/26/18 for severe hypoglycemia which thought to be 2/2 exogenous insulin use, alcohol and poor po intake, she presented to the ED again on 9/3/2018 for hypoglycemia episode that led to LOC. Insulin and C-peptide on 9/3 is compatible with exogenous insulin.    # Severe, recurrent hypoglycemia  This episode started after she was placed on the pump on 8/29. She was admitted on 9/3 morning with hypoglycemia. The pump and meter reviewed on 9/5. It seems like the patient was on the pump from 8/29 to 9/3 AM, did not discontinue on 9/2 per reported. She has been on basal rate with very minimal bolus likely from the machine 0.2 units for BG of 160 on 8/29 and 8/30, 1 time/day. Her pump seems to be working normally without unusual basal rate or bolus.   Her pump setting is as below:  Basal   [id number]: 0.400  [id number]: 0.500    Bolus  CHO ratio: 1:40    Sensitivity: 100  BG target .  Active insulin time 4 hr    Her meter showed that on the 8/29-9/1 her BGs were good on basal insulin, but then she got sick and unable to eat on 9/2 which led to low BGs. And she continued using insulin pump with the basal rate running. The labs on 9/3 showed Plasma glucose 132, insulin  45.6, c-peptide 0.2 which is compatible with exogenous insulin. Unclear why she had exogenous insulin in her system 18 hour off the pump. Her blood sugars have been in a range off D10 since 9/4 with , and without getting insulin since admission on 9/3 AM.       # IDDM on home insulin pump  Patient with hx of TPIAT with reported beta-cell function,  but she is, at times, reliant on daily insulin. She has detectable c-peptide levels in 2/2018. Follows with Dr. Maher in clinic. However, from the labs this admission, it showed that she had exogenous insulin in her system longer and higher than she should be. Here in the hospital, she has not been given insulin for 48 hours without going into DKA or have elevated BG than 200. Recheck c-peptide on 9/6 showed 1.8 and insulin of 9.3 with POC BG of 120, and C-peptide on 9/7 of 2.8 with POC BG of 130. This shows that the patient is making intrinsic insulin. Once she starts eating more, her BG could go higher. Discussed with  about alternatives treatment for diabetes like SGLT-2 inhibitor (empagliflozin) that is not going to interfere with the beta-cell if her AM BG >150 and post meal >180. And on the day that she had GI symptoms, unable to eat, would have her hold empagliflozin and check urine ketones due to risk of normoglycemic DKA.       # Possible secondary adrenal insufficiency  Patient originally diagnosed in 03/2016 with possible opioid-related secondary AI, however this diagnosis is questioned by her primary endocrinologist. She has remained on 10mg hydrocortisone BID since 2016. Will have to re-evaluate if the patient has adrenal insufficiency or not since the hydrocortisone could suppress the pituitary response to hypoglycemia and led to severe hypoglycemia.     Recommendations:  - No basal insulin  - Discontinue insulin pump  - Continues TF per primary team   - BG premeals, hs  - Hypoglycemia protocol -- start treatment if BG <60 or symptomatic  - Continue hydrocortisone 10 mg bid   - If BG >200, will treat with low sliding scale insulin for now  - Will evaluate tomorrow BGs if high, plan to start Empagliflozin.      Endocrine will continue to follow.    The patient is discussed with .      Larissa Mckinley MD  Endocrine fellow  [phone]    Endocrine Attending Note  The management of  this patient was discussed with me and I agree with recommendation.    Mitali Ward

## 2018-09-07 NOTE — PROGRESS NOTES
Calorie Counts  Intake recorded for: 9/6 Kcals: 0  Protein: 0g  # Meals Recorded: no meals ordered from kitchen, no intake recorded.   # Supplements Recorded: no intake recorded.

## 2018-09-08 DIAGNOSIS — K86.81 EXOCRINE PANCREATIC INSUFFICIENCY: Primary | ICD-10-CM

## 2018-09-08 NOTE — PROGRESS NOTES
Pt understood discharge orders/instructions : Yes.  Pt's belongings : Pt took.  Discharge medications : Pt will pick it up at his own pharmacy ( faxed).  Lines : PIV was removed.  How is pt getting home/transportion : Pt's .  Discharge papers : Given to the pt.  Follow up appt : N/A.  Home supply : N/A.

## 2018-09-11 DIAGNOSIS — R11.0 NAUSEA: Primary | ICD-10-CM

## 2018-09-11 DIAGNOSIS — K59.00 CONSTIPATION: Primary | ICD-10-CM

## 2018-09-11 NOTE — TELEPHONE ENCOUNTER
senna-docusate (SENOKOT-S;PERICOLACE) 8.6-50 MG per tablet      Last Written Prescription Date:  *Historical   Last Office Visit : 8/8/18  Future Office visit:  9/17/18  Routing refill request to provider for review/approval because:  Medication is reported/historical  *pended Rx as requested    ------------------------------------    Discussed with JUAN MANUEL Hou to fill.    Jahaira Mi RN  9/13/2018 8:06 AM

## 2018-09-12 ENCOUNTER — TELEPHONE (OUTPATIENT)
Dept: TRANSPLANT | Facility: CLINIC | Age: 53
End: 2018-09-12

## 2018-09-12 DIAGNOSIS — Z90.410 POST-PANCREATECTOMY DIABETES (H): ICD-10-CM

## 2018-09-12 DIAGNOSIS — R73.9 ELEVATED BLOOD SUGAR: Primary | ICD-10-CM

## 2018-09-12 DIAGNOSIS — E89.1 POST-PANCREATECTOMY DIABETES (H): ICD-10-CM

## 2018-09-12 DIAGNOSIS — E13.9 POST-PANCREATECTOMY DIABETES (H): ICD-10-CM

## 2018-09-12 LAB
ACETOHEXAMIDE SERPL-MCNC: NEGATIVE UG/ML (ref 20–60)
CHLORPROPAMIDE SERPL-MCNC: NEGATIVE UG/ML (ref 75–250)
GLIMEPIRIDE SERPL-MCNC: NEGATIVE NG/ML (ref 80–250)
GLIPIZIDE SERPL-MCNC: NEGATIVE NG/ML (ref 200–1000)
GLYBURIDE SERPL-MCNC: NEGATIVE NG/ML
NATEGLINIDE SERPL-MCNC: NEGATIVE NG/ML
REPAGLINIDE SERPL-MCNC: NEGATIVE NG/ML
TOLAZAMIDE SERPL-MCNC: NEGATIVE UG/ML
TOLBUTAMIDE SERPL-MCNC: NEGATIVE UG/ML (ref 40–100)

## 2018-09-12 NOTE — TELEPHONE ENCOUNTER
Spoke to Dr Ward again to let her know of my previous conversation with Chantell ( re some one telling her to restart her pump ). She verified the plan to STOP the pump and to check her urine Ketones . If she developed +urine ketones with a blood sugar at or above 300 she is to go the the ER. I called and re-explained this to Chantell emphisising we want to make sure she is safe and does not risk hypoglycemia.. I explained why she is going to test her urine for ketones ( to make sure she is not breaking down fat instead of glucose for energy, and that build up of ketones can be harmful ). I also gave her the number for on call endocrinology  to call if she has ANY questions or concerns after hours.She expressed understanding of the above instructions, thanked me and will call her pharmacy to check on the ketone strip prescription.She also knows she can e-mail Dr Maher but that she is out of town and may not respond in a timely manner .

## 2018-09-12 NOTE — TELEPHONE ENCOUNTER
Reglan   Last Written Prescription Date:  unknown  Last Fill Quantity: ,   # refills:   Last Office Visit : 11-24-17  Future Office visit:  9-17-18    Routing refill request to provider for review/approval because:  Medication listed as historical    ---------------------    Discussed with JUAN MANUEL Hou to fill.    Jahaira Mi RN  9/13/2018 8:07 AM

## 2018-09-12 NOTE — TELEPHONE ENCOUNTER
Spoke to Dr Ward around 11am and she confirmed that she did NOT want Chantell to be using her Insulin pump. THIS is reflected on the discharge summary. Her recommendations were that Chantell disconnect the pump and monitor her BS. IF her BS get over 300  AND she has ketones in her urine then she needs to come to the ER.   I called Chantell and she reviewed her DC summary , which appeared to match the one I was reviewing in EPIC . However she stated it did not say anything about discontinuing the pump. The one I was looking at clearly states this..She also said that a female from the  called her Saturday AM around 10:30 and instructed her in new pump settings and to resume using her pump. I asked her to look back on her caller ID to see if she could identify a number this person called from. ( At time of writing she has not called back with such ) She has no ketone strips and appears to have no knowledge of what they are or how to use them.She states her tube feeds are running at 40 mls/hr.  I have paged Dr Ward with this additional information.

## 2018-09-12 NOTE — TELEPHONE ENCOUNTER
"Spoke to Alize   from [facility] this am.She wants an OK  For Chantell to use flush bags rather than bolus flushed. I informed her I was not following her tube feeds . I said I would look into this. I reviewed the notes and realised she has an NJ tube with 24/7 tube feeds. I called Chantell and she does not know what the short/long tem plan is for the NJ ot tube feeds. She does have a follow up with a primary care provider on Monday and I said I would try and touch base with this person. Chantell says she still occasionally vomits and is only trying clear liquids. Her nose and throat are sore and this is why she has not tried eating more. During the conversation I asked her about her BS and discovered she has restarted her Insulin pump.She did this on Saturday. I questioned this saying \" I thought that had been discontinued \". She said that her BS were ok ( 160-240 and that someone in the hospital had reset it ?I did not challenge her. I did however review the most recent notes and it appears that it should be discontinued.I have attempted to page the endocrinology fellow, Dr. Larissa Mckinley who saw her in the hospital. I will try to page Dr Ward, who was attending at the time and route this note to her and Dr Maher.  "

## 2018-09-13 RX ORDER — AMOXICILLIN 250 MG
1-2 CAPSULE ORAL DAILY PRN
Qty: 60 TABLET | Refills: 0 | Status: SHIPPED | OUTPATIENT
Start: 2018-09-13 | End: 2019-03-15

## 2018-09-13 RX ORDER — METOCLOPRAMIDE 5 MG/1
10 TABLET ORAL 3 TIMES DAILY
Qty: 180 TABLET | Refills: 0 | Status: SHIPPED | OUTPATIENT
Start: 2018-09-13 | End: 2019-03-15

## 2018-09-17 ENCOUNTER — OFFICE VISIT (OUTPATIENT)
Dept: INTERNAL MEDICINE | Facility: CLINIC | Age: 53
End: 2018-09-17
Payer: MEDICARE

## 2018-09-17 VITALS
SYSTOLIC BLOOD PRESSURE: 114 MMHG | WEIGHT: 147.5 LBS | BODY MASS INDEX: 26.98 KG/M2 | OXYGEN SATURATION: 100 % | HEART RATE: 67 BPM | DIASTOLIC BLOOD PRESSURE: 72 MMHG | TEMPERATURE: 98.1 F

## 2018-09-17 DIAGNOSIS — Z09 HOSPITAL DISCHARGE FOLLOW-UP: Primary | ICD-10-CM

## 2018-09-17 DIAGNOSIS — Z90.410 POST-PANCREATECTOMY DIABETES (H): ICD-10-CM

## 2018-09-17 DIAGNOSIS — D72.829 LEUKOCYTOSIS, UNSPECIFIED TYPE: ICD-10-CM

## 2018-09-17 DIAGNOSIS — M54.5 CHRONIC RIGHT-SIDED LOW BACK PAIN, WITH SCIATICA PRESENCE UNSPECIFIED: ICD-10-CM

## 2018-09-17 DIAGNOSIS — E13.9 POST-PANCREATECTOMY DIABETES (H): ICD-10-CM

## 2018-09-17 DIAGNOSIS — J34.89 IRRITATION OF NOSE: ICD-10-CM

## 2018-09-17 DIAGNOSIS — Z09 HOSPITAL DISCHARGE FOLLOW-UP: ICD-10-CM

## 2018-09-17 DIAGNOSIS — G89.29 CHRONIC RIGHT-SIDED LOW BACK PAIN, WITH SCIATICA PRESENCE UNSPECIFIED: ICD-10-CM

## 2018-09-17 DIAGNOSIS — E89.1 POST-PANCREATECTOMY DIABETES (H): ICD-10-CM

## 2018-09-17 DIAGNOSIS — Z00.00 ROUTINE HEALTH MAINTENANCE: ICD-10-CM

## 2018-09-17 LAB
ALBUMIN SERPL-MCNC: 3.6 G/DL (ref 3.4–5)
ALP SERPL-CCNC: 97 U/L (ref 40–150)
ALT SERPL W P-5'-P-CCNC: 42 U/L (ref 0–50)
ANION GAP SERPL CALCULATED.3IONS-SCNC: 8 MMOL/L (ref 3–14)
AST SERPL W P-5'-P-CCNC: 30 U/L (ref 0–45)
BASOPHILS # BLD AUTO: 0.1 10E9/L (ref 0–0.2)
BASOPHILS NFR BLD AUTO: 0.9 %
BILIRUB SERPL-MCNC: 0.3 MG/DL (ref 0.2–1.3)
BUN SERPL-MCNC: 8 MG/DL (ref 7–30)
CALCIUM SERPL-MCNC: 8.7 MG/DL (ref 8.5–10.1)
CHLORIDE SERPL-SCNC: 105 MMOL/L (ref 94–109)
CO2 SERPL-SCNC: 27 MMOL/L (ref 20–32)
CREAT SERPL-MCNC: 0.66 MG/DL (ref 0.52–1.04)
DIFFERENTIAL METHOD BLD: ABNORMAL
EOSINOPHIL # BLD AUTO: 0.1 10E9/L (ref 0–0.7)
EOSINOPHIL NFR BLD AUTO: 1.1 %
ERYTHROCYTE [DISTWIDTH] IN BLOOD BY AUTOMATED COUNT: 13.2 % (ref 10–15)
GFR SERPL CREATININE-BSD FRML MDRD: >90 ML/MIN/1.7M2
GLUCOSE SERPL-MCNC: 283 MG/DL (ref 70–99)
HCT VFR BLD AUTO: 34 % (ref 35–47)
HGB BLD-MCNC: 11.5 G/DL (ref 11.7–15.7)
IMM GRANULOCYTES # BLD: 0 10E9/L (ref 0–0.4)
IMM GRANULOCYTES NFR BLD: 0.3 %
LYMPHOCYTES # BLD AUTO: 2.3 10E9/L (ref 0.8–5.3)
LYMPHOCYTES NFR BLD AUTO: 28.5 %
MCH RBC QN AUTO: 30.6 PG (ref 26.5–33)
MCHC RBC AUTO-ENTMCNC: 33.8 G/DL (ref 31.5–36.5)
MCV RBC AUTO: 90 FL (ref 78–100)
MONOCYTES # BLD AUTO: 0.5 10E9/L (ref 0–1.3)
MONOCYTES NFR BLD AUTO: 5.9 %
NEUTROPHILS # BLD AUTO: 5 10E9/L (ref 1.6–8.3)
NEUTROPHILS NFR BLD AUTO: 63.3 %
NRBC # BLD AUTO: 0 10*3/UL
NRBC BLD AUTO-RTO: 0 /100
PHOSPHATE SERPL-MCNC: 2.9 MG/DL (ref 2.5–4.5)
PLATELET # BLD AUTO: 382 10E9/L (ref 150–450)
POTASSIUM SERPL-SCNC: 3.5 MMOL/L (ref 3.4–5.3)
PROT SERPL-MCNC: 7 G/DL (ref 6.8–8.8)
RBC # BLD AUTO: 3.76 10E12/L (ref 3.8–5.2)
SODIUM SERPL-SCNC: 140 MMOL/L (ref 133–144)
WBC # BLD AUTO: 7.9 10E9/L (ref 4–11)

## 2018-09-17 RX ORDER — ECHINACEA PURPUREA EXTRACT 125 MG
1 TABLET ORAL DAILY PRN
Qty: 30 ML | Refills: 0 | Status: SHIPPED | OUTPATIENT
Start: 2018-09-17

## 2018-09-17 RX ORDER — DRONABINOL 2.5 MG/1
5 CAPSULE ORAL 2 TIMES DAILY PRN
Qty: 56 CAPSULE | Refills: 0 | Status: SHIPPED | OUTPATIENT
Start: 2018-09-17 | End: 2018-11-16

## 2018-09-17 ASSESSMENT — PAIN SCALES - GENERAL: PAINLEVEL: EXTREME PAIN (8)

## 2018-09-17 NOTE — PROGRESS NOTES
[facility]  [facility] [place]   BEE Rizzo CNP  09/17/2018      Chief Complaint:   Hospital F/U     History of Present Illness:   Chantell Kidd is a 52 year old female on tube feed with a history of post-pancreatectomy diabetes, hypothyroidism, GERD, and DJD who presents for a hospital follow-up. The patient was admitted to the hospital on 8/26/18 for hypoglycemia in the setting of too much insulin and abdominal pain. BGs 40s at home. She was then admitted between 9/3 until 9/7 for hypoglycemia. She was given IV fluids then when stable, NJ tube placed for feeding, and she was discharged with diclofenac patches and recommended to be reassessed by the pain clinic for chronic abdominal and back pain. She was discharged with instructions to follow-up with Dr. Maher. She continues on tube feedings at 40 ml/hr.     Today, she feels uncomfortable with her NJ tube as it is uncomfortable through her nose and it causes her eyes to water and she develops a headache. She also notes a sore throat and feelings of something sticking in her throat. She denies fevers. Her blood glucose has improved with reported ranges from 130-365 with her average around 180. She has restarted using her insulin pump, however discharge summary states the pump should have been discontinued. She said this was the case initially, but then she was given the okay to restart.     Back Pain: She reports ongoing back pain for at least 2 years that is mostly right sided, but radiates to the left side. She has been increasing her Tylenol use for this lately. Additionally, she notes some occasional weakness in her knees. She denies numbness or tingling in her legs or groin, urinary incontinence, urinary urgency, dysuria, stool incontinence, or abnormal bowel movements. She has previously done physical therapy for this and used medications, but has not had improvement in her symptoms. She has used marinol in the past, found this helpful, and is  requesting a refill of this today.     Review of Systems:   Pertinent items are noted in HPI, remainder of complete ROS is negative.      Active Medications:      acetaminophen 500 MG CAPS, Take 1,000 mg by mouth three times a day as needed., Disp: , Rfl:      alendronate (FOSAMAX) 70 MG tablet, Take 1 tablet (70 mg) by mouth every 7 days On Sundays take first thing in the morning with plain water and remain upright for at least 30 minutes and until after first food of the day  Do not restart Fosamax (alendronate) until your difficulty swallowing has resolved and you have finished the entire course of fluconazole (Diflucan)., Disp: 4 tablet, Rfl: 0     alum & mag hydroxide-simethicone (MYLANTA/MAALOX) 200-200-25 MG CHEW chewable tablet, Take 1 tablet by mouth 3 times daily as needed for indigestion, Disp: , Rfl:      amylase-lipase-protease (CREON 12) 83785 units CPEP, Take 6 to 8 capsules by mouth with meals and take 4 capsules with snacks. Needs up to 25 capsules per day, Disp: 750 capsule, Rfl: 5     aspirin 81 MG tablet, Take 81 mg by mouth daily., Disp: , Rfl:      clotrimazole (MYCELEX) 10 MG LOZG lozenge, Place 1 lozenge (1 Brenda) inside cheek 4 times daily, Disp: 70 each, Rfl: 0     cyclobenzaprine (FLEXERIL) 5 MG tablet, Take 10 mg by mouth 2 times daily as needed for muscle spasms, Disp: , Rfl:      diclofenac (FLECTOR) 1.3 % Patch, Place 1 patch onto the skin 2 times daily, Disp: 30 each, Rfl: 1     diclofenac (VOLTAREN) 1 % GEL, 2 g Apply 2 g to skin four times a day as needed (to affected upper extremity joint(s)). Maximum 8g/day per joint, 16g/day total., Disp: , Rfl:      dicyclomine (BENTYL) 10 MG capsule, TAKE ONE CAPSULE BY MOUTH EVERY 6 HOURS AS NEEDED, Disp: 40 capsule, Rfl: 3     dronabinol (MARINOL) 2.5 MG capsule, Take 2 capsules (5 mg) by mouth 2 times daily as needed, Disp: 56 capsule, Rfl: 0     DULoxetine (CYMBALTA) 30 MG EC capsule, Take 1 capsule (30 mg) by mouth daily With 60mg capsule  for total dose of 90mg, Disp: 90 capsule, Rfl: 1     DULoxetine (CYMBALTA) 60 MG EC capsule, Take 1 capsule (60 mg) by mouth daily With 30mg capsule for total dose of 90mg, Disp: 90 capsule, Rfl: 1     fluconazole (DIFLUCAN) 200 MG tablet, Take 2 tabs the first day and 1 tab for the next 13 days, Disp: 15 tablet, Rfl: 0     furosemide (LASIX) 20 MG tablet, Take 1 tablet (20 mg) by mouth 2 times daily, Disp: 180 tablet, Rfl: 2     glucagon (GLUCAGON EMERGENCY) 1 MG kit, Inject 1 mg into the muscle once for 1 dose, Disp: 1 mg, Rfl: 1     hydrocortisone (CORTEF) 10 MG tablet, Take 10 mg in the morning and 10 mg at bedtime. Watch for hypoglycemia recurrence., Disp: 60 tablet, Rfl: 3     insulin pen needle, RX# [id number]  Pen Needle UC 31G UF IV Mini  Use 4-8 needles per day for insulin injections.  , Disp: 2 Box, Rfl: 11     levothyroxine (SYNTHROID/LEVOTHROID) 112 MCG tablet, Take 1 tablet (112 mcg) by mouth daily, Disp: 30 tablet, Rfl: 0     linaclotide (LINZESS) 145 MCG capsule, Take 1 capsule (145 mcg) by mouth every morning (before breakfast), Disp: 30 capsule, Rfl: 0     metoclopramide (REGLAN) 5 MG tablet, Take 2 tablets (10 mg) by mouth 3 times daily, Disp: 180 tablet, Rfl: 0     Nutritional Supplements (BOOST HIGH PROTEIN) LIQD, Also can use Ensure clear (available over the counter), Disp: , Rfl: 0     nystatin (MYCOSTATIN) 79158 unit/0.5mL SUSP, Take 1 mL (100,000 Units) by mouth 4 times daily, Disp: 60 mL, Rfl: 1     ondansetron (ZOFRAN-ODT) 4 MG ODT tab, DISSOLVE ONE TABLET ON THE TONGUE EVERY 6 HOURS AS NEEDED FOR NAUSEA AND VOMITING, Disp: 60 tablet, Rfl: 2     pantoprazole (PROTONIX) 40 MG enteric coated tablet, Take 1 tablet (40 mg) by mouth 2 times daily, Disp: 180 tablet, Rfl: 3     polyethylene glycol (MIRALAX/GLYCOLAX) packet, Take 1 packet by mouth 2 times daily as needed for constipation , Disp: 14 each, Rfl: 5     potassium chloride SA (K-DUR/KLOR-CON M) 20 MEQ CR tablet, Take 1 tablet (20 mEq) by  mouth daily, Disp: 90 tablet, Rfl: 1     pregabalin (LYRICA) 150 MG capsule, Take 1 capsule (150 mg) by mouth 3 times daily, Disp: 90 capsule, Rfl: 5     senna-docusate (SENOKOT-S;PERICOLACE) 8.6-50 MG per tablet, Take 1-2 tablets by mouth daily as needed for constipation, Disp: 60 tablet, Rfl: 0     SUMAtriptan (IMITREX) 50 MG tablet, Take 1 tablet (50 mg) by mouth at onset of headache for migraine Take 1 Tab by mouth Once as needed for Migraine Headache. May repeat after two hours.  Maximum dose 200 mg/24 hours., Disp: 30 tablet, Rfl: 1     topiramate (TOPAMAX) 100 MG tablet, Take 1 tablet (100 mg) by mouth 2 times daily, Disp: 180 tablet, Rfl: 1     traZODone (DESYREL) 100 MG tablet, TAKE 1-2 TABLETS BY MOUTH AN HOUR BEFORE BEDTIME, Disp: 100 tablet, Rfl: 1     Urine Glucose-Ketones Test STRP, 1 strip by In Vitro route 3 times daily Test urine 3 times a day., Disp: 30 each, Rfl: 1      Allergies:   Corticosteroids and Chocolate flavor      Past Medical History:  Abdominal pain  Pancreatitis   Depression with anxiety  GERD  hypothyroidism  Migraines  Back pain   Hypoglycemia unawareness   Post-pancreatectomy diabetes   Type I diabetes mellitus   Degenerative joint disease  Odynophagia   Anemia   Adrenal insufficiency      Past Surgical History:  Arthroplasty carpometacarpal  Cholecystectomy   Endoscopic retrograde cholangiopancreatogram, x3  hysterectomy   Herniorrhaphy ventral  Incision and drainage abdomen washout   Inject transversus abdominis plan block, bilateral  laparoscopic appendectomy   Pancreatectomy, transplant   Replace gastrostomy tube, percutaneous  splenectomy     Family History:   Mother: hypertension, diabetes, osteoporosis  Father: pancreatic cancer  Maternal grandmother: diabetes, cardiovascular disease, lung cancer  Sister: brain cancer, liver cancer       Social History:   The patient is , a nonsmoker, and consumes alcohol.     Physical Exam:   /72  Pulse 67  Temp 98.1  F (36.7   C) (Oral)  Wt 66.9 kg (147 lb 8 oz)  SpO2 100%  BMI 26.98 kg/m2   Constitutional: Alert, oriented, pleasant, no acute distress  Head: Normocephalic, atraumatic  Eyes: Extra-ocular movements intact, no scleral icterus  ENT: Oropharynx clear, moist mucus membranes, good dentition  Neck: Supple, no lymphadenopathy, no thyromegaly  Cardiovascular: Regular rate and rhythm, no murmurs, rubs or gallops  Respiratory: Good air movement bilaterally, lungs clear, no wheezes/rales/rhonchi  GI: Abdomen soft, bowel sounds present, nondistended,  Mild tenderness bilateral lower quadrants, no organomegaly or masses, no rebound/guarding, feeding tube in place  Musculoskeletal: No edema, normal muscle tone, normal gait  Back exam:   Inspection: No rashes, bruising, scars  Palpation:  No muscle spasm or scoliosis.  Non-tender spine, sacroiliac joints, sciatic notches and trochanters  ROM: Extension, side bending intact.  +pain with lateral rotation  Gait is steady without assistance  Strength: 5/5 hip flexion, abduction, adduction, knee flex/ext, foot flex/ext  Sensation:  Grossly intact to light touch  DTRs:  2+ patella  Straight leg raise: negative  Hip flexion, external and internal rotation does not reproduce pain.  Neurologic: Alert and oriented, no focal deficits  Psychiatric: normal mentation, affect and mood      Assessment and Plan:  Chantell Kidd is a 52 year old female on tube feed with a history of post-pancreatectomy diabetes, hypothyroidism, GERD, and DJD who presents for a hospital follow-up.     Chronic right-sided low back pain, with sciatica presence unspecified  MRI of back as she has been experiencing this pain for over 2 years without previous MRI. Following her MRI results, she will follow-up with Spine specialist about potential treatment options; discussed potential benefit of epidural injections.   - MRI Lumbar spine w/o contrast  - ORTHO  REFERRAL    Irritation of nose  Nasal spray recommended to  help prevent dryness and irritation with the NJ tube.   - sodium chloride (OCEAN) 0.65 % nasal spray  Dispense: 30 mL; Refill: 0    Hospital discharge follow-up, Leukocytosis, unspecified type  Patient currently stable at home with BGs 130s-365, averaging around 180 per her report. She has started using her insulin pump again, but I see notes that she was instructed NOT to restart the pump (9/12/18)--per inpatient endocrinology. I did touch base with Dr. Maher, who she will be seeing on 9/19--will not make any changes at this point, as her blood sugars have been elevated at home without clear hypoglycemic episodes since discharge while on tube feedings.   - Phosphorus  - Comprehensive metabolic panel  - CBC with platelets differential    Routine health maintenance  Discussed with PCP Dr. Morgan. Marinol last filled in April, will refill today.   - dronabinol (MARINOL) 2.5 MG capsule  Dispense: 56 capsule; Refill: 0     Follow-up: Follow-up as scheduled with Dr. Morgan next month    Scribe Disclosure:   I, Mitali Canales, am serving as a scribe to document services personally performed by BEE Rizzo CNP at this visit, based upon the provider's statements to me. All documentation has been reviewed by the aforementioned provider prior to being entered into the official medical record.     Portions of this medical record were completed by a scribe. UPON MY REVIEW AND AUTHENTICATION BY ELECTRONIC SIGNATURE, this confirms (a) I performed the applicable clinical services, and (b) the record is accurate.     BEE Rizzo CNP

## 2018-09-17 NOTE — NURSING NOTE
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

## 2018-09-17 NOTE — MR AVS SNAPSHOT
After Visit Summary   9/17/2018    Chantell Kidd    MRN: [id number]           Patient Information     Date Of Birth          1965        Visit Information        Provider Department      9/17/2018 8:15 AM Delilah Orosco APRN CNP [facility]        Today's Diagnoses     Hospital discharge follow-up    -  1    Irritation of nose        Post-pancreatectomy diabetes (H)        Chronic right-sided low back pain, with sciatica presence unspecified        Leukocytosis, unspecified type        Routine health maintenance           Follow-ups after your visit        Additional Services     ORTHO  REFERRAL       [facility] is referring you to the Orthopedic  Services at [facility].       The  Representative will assist you in the coordination of your Orthopedic and Musculoskeletal Care as prescribed by your physician.    The  Representative will call you within 1 business day to help schedule your appointment, or you may contact the  Representative at:    All areas ~ [phone]     Type of Referral : Spine: Lumbar  **Choose Medical Spine Specialist (unless patient was seen by a Medical Spine Specialist within the past 6 months).**  Surgical Evaluation is advised if the patient presents with one or more of the following red flags: Evidence of Spinal Tumor, Infection or Fracture, Cauda Equina Syndrome, Sudden or Progressive Weakness, Loss of Bowel or Bladder Control, or any other documented emergent neurological condition resulting from a Lumbar Spinal Condition. Medical Spine Specialist        Timeframe requested: Within 2 weeks    Coverage of these services is subject to the terms and limitations of your health insurance plan.  Please call member services at your health plan with any benefit or coverage questions.      If X-rays, CT or MRI's have been performed, please contact the facility where  they were done to arrange for , prior to your scheduled appointment.  Please bring this referral request to your appointment and present it to your specialist.                  Your next 10 appointments already scheduled     Sep 19, 2018  9:00 AM CDT   (Arrive by 8:45 AM)   Return [place] with Amena Maher MD   [facility] ([facility])    [address]  [address]  [facility] [address] [phone]            Sep 25, 2018  8:00 AM CDT   MR LUMBAR SPINE W/O CONTRAST with MGMR1   [facility] ([facility])    [address] [place] [address] [phone]           Take your medicines as usual, unless your doctor tells you not to. Bring a list of your current medicines to your exam (including vitamins, minerals and over-the-counter drugs). Also bring the results of similar scans you may have had.  Please remove any body piercings and hair extensions before you arrive.  Follow your doctor s orders. If you do not, we may have to postpone your exam.  You may or may not receive IV contrast for this exam pending the discretion of the Radiologist.  You do not need to do anything special to prepare.  The MRI machine uses a strong magnet. Please wear clothes without metal (snaps, zippers). A sweatsuit works well, or we may give you a hospital gown.   **IMPORTANT** THE INSTRUCTIONS BELOW ARE ONLY FOR THOSE PATIENTS WHO HAVE BEEN PRESCRIBED SEDATION OR GENERAL ANESTHESIA DURING THEIR MRI PROCEDURE:  IF YOUR DOCTOR PRESCRIBED ORAL SEDATION (take medicine to help you relax during your exam):   You must get the medicine from your doctor (oral medication) before you arrive. Bring the medicine to the exam. Do not take it at home. You ll be told when to take it upon arriving for your exam.   Arrive one hour early. Bring someone who can take you home after the test. Your medicine will make you sleepy. After the exam,  you may not drive, take a bus or take a taxi by yourself.  IF YOUR DOCTOR PRESCRIBED IV SEDATION:   Arrive one hour early. Bring someone who can take you home after the test. Your medicine will make you sleepy. After the exam, you may not drive, take a bus or take a taxi by yourself.   No eating 6 hours before your exam. You may have clear liquids up until 4 hours before your exam. (Clear liquids include water, clear tea, black coffee and fruit juice without pulp.)  IF YOUR DOCTOR PRESCRIBED ANESTHESIA (be asleep for your exam):   Arrive 1 1/2 hours early. Bring someone who can take you home after the test. You may not drive, take a bus or take a taxi by yourself.   No eating 8 hours before your exam. You may have clear liquids up until 4 hours before your exam. (Clear liquids include water, clear tea, black coffee and fruit juice without pulp.)   You will spend four to five hours in the recovery room.  Please call the Imaging Department at your exam site with any questions.            Sep 27, 2018 10:00 AM CDT   (Arrive by 9:45 AM)   New Patient Visit with Juancho Vazquez MD   [facility] ([facility])    [address]  [address]th [address]  [facility] [address] [phone]            Oct 04, 2018 10:20 AM CDT   (Arrive by 10:05 AM)   Return Visit with Ron Morgan MD   [facility] ([facility])    [address]  4th [address]  [facility] [id number] [phone]              Future tests that were ordered for you today     Open Future Orders        Priority Expected Expires Ordered    MRI Lumbar spine w/o contrast Routine  9/17/2019 9/17/2018            Who to contact     Please call your clinic at [phone] to:    Ask questions about your health    Make or cancel appointments    Discuss your medicines    Learn about your test results    Speak to your doctor            Additional Information About Your  Visit        [employer] Information     [employer] gives you secure access to your electronic health record. If you see a primary care provider, you can also send messages to your care team and make appointments. If you have questions, please call your primary care clinic.  If you do not have a primary care provider, please call [phone] and they will assist you.      [employer] is an electronic gateway that provides easy, online access to your medical records. With [employer], you can request a clinic appointment, read your test results, renew a prescription or communicate with your care team.     To access your existing account, please contact your [facility] or call [phone] for assistance.        Care EveryWhere ID     This is your Care EveryWhere ID. This could be used by other organizations to access your [place] medical records  [id number]        Your Vitals Were     Pulse Temperature Pulse Oximetry BMI (Body Mass Index)          67 98.1  F (36.7  C) (Oral) 100% 26.98 kg/m2         Blood Pressure from Last 3 Encounters:   09/17/18 114/72   09/07/18 110/73   08/29/18 134/81    Weight from Last 3 Encounters:   09/17/18 66.9 kg (147 lb 8 oz)   09/06/18 64 kg (141 lb 3.2 oz)   08/29/18 65.9 kg (145 lb 3.2 oz)              We Performed the Following     ORTHO  REFERRAL          Today's Medication Changes          These changes are accurate as of 9/17/18 11:59 PM.  If you have any questions, ask your nurse or doctor.               Start taking these medicines.        Dose/Directions    sodium chloride 0.65 % nasal spray   Commonly known as:  OCEAN   Used for:  Irritation of nose   Started by:  Delilah Orosco APRN CNP        Dose:  1 spray   Spray 1 spray into both nostrils daily as needed for congestion   Quantity:  30 mL   Refills:  0            Where to get your medicines      These medications were sent to [id number] - [place], [place] - [address], [facility] [address]    Hours:  test Rx sent successfully 12/26/02  KR Phone:  [phone]     sodium chloride 0.65 % nasal spray         Some of these will need a paper prescription and others can be bought over the counter.  Ask your nurse if you have questions.     Bring a paper prescription for each of these medications     dronabinol 2.5 MG capsule                Primary Care Provider Office Phone # Fax #    Ron Morgan [phone]       [address] [place] [address]        Equal Access to Services     [facility] Hospital[facility]ONDA : Hadii jose maria ku hadasho Socraig, waaxda luqadaha, qaybta kaalmada adewan, bambi christianson . So [facility] [phone].    ATENCIÓN: Si habla español, tiene a webb disposición servicios gratuitos de asistencia lingüística. LlPark[facility] [phone].    We comply with applicable federal civil rights laws and [place] laws. We do not discriminate on the basis of race, color, national origin, age, disability, sex, sexual orientation, or gender identity.            Thank you!     Thank you for choosing [facility]  for your care. Our goal is always to provide you with excellent care. Hearing back from our patients is one way we can continue to improve our services. Please take a few minutes to complete the written survey that you may receive in the mail after your visit with us. Thank you!             Your Updated Medication List - Protect others around you: Learn how to safely use, store and throw away your medicines at www.disposemymeds.org.          This list is accurate as of 9/17/18 11:59 PM.  Always use your most recent med list.                   Brand Name Dispense Instructions for use Diagnosis    acetaminophen 500 MG Caps      Take 1,000 mg by mouth three times a day as needed.        alendronate 70 MG tablet    FOSAMAX    4 tablet    Take 1 tablet (70 mg) by mouth every 7 days On Sundays take  first thing in the morning with plain water and remain upright for at least 30 minutes and until after first food of the day  Do not restart Fosamax (alendronate) until your difficulty swallowing has resolved and you have finished the entire course of fluconazole (Diflucan).    Osteoporosis       alum & mag hydroxide-simethicone 200-200-25 MG Chew chewable tablet    MYLANTA/MAALOX     Take 1 tablet by mouth 3 times daily as needed for indigestion        amylase-lipase-protease 69548 units Cpep    CREON 12    750 capsule    Take 6 to 8 capsules by mouth with meals and take 4 capsules with snacks. Needs up to 25 capsules per day    Exocrine pancreatic insufficiency       aspirin 81 MG tablet      Take 81 mg by mouth daily.        blood glucose monitoring lancets     102 each    by Lancet route. Use to test blood sugar daily or as directed.    Chronic abdominal pain       * blood glucose monitoring test strip    no brand specified    240 each    Use to test blood glucoses 6-8 times per day.    Post-pancreatectomy diabetes (H)       * blood glucose monitoring test strip    NOEMI CONTOUR NEXT    240 each    Use to test blood sugar 8 times daily.    Post-pancreatectomy diabetes (H)       BOOST HIGH PROTEIN Liqd      Also can use Ensure clear (available over the counter)    Pancreatic insufficiency       clotrimazole 10 MG Lozg lozenge    MYCELEX    70 each    Place 1 lozenge (1 Brenda) inside cheek 4 times daily    Thrush       cyclobenzaprine 5 MG tablet    FLEXERIL     Take 10 mg by mouth 2 times daily as needed for muscle spasms        diclofenac 1 % Gel topical gel    VOLTAREN     2 g Apply 2 g to skin four times a day as needed (to affected upper extremity joint(s)). Maximum 8g/day per joint, 16g/day total.        diclofenac 1.3 % Patch    FLECTOR    30 each    Place 1 patch onto the skin 2 times daily    Generalized abdominal pain       dicyclomine 10 MG capsule    BENTYL    40 capsule    TAKE ONE CAPSULE BY MOUTH  EVERY 6 HOURS AS NEEDED    Abdominal pain, epigastric       dronabinol 2.5 MG capsule    MARINOL    56 capsule    Take 2 capsules (5 mg) by mouth 2 times daily as needed    Routine health maintenance       * DULoxetine 30 MG EC capsule    CYMBALTA    90 capsule    Take 1 capsule (30 mg) by mouth daily With 60mg capsule for total dose of 90mg    Major depressive disorder, recurrent episode, moderate (H), CIERRA (generalized anxiety disorder)       * DULoxetine 60 MG EC capsule    CYMBALTA    90 capsule    Take 1 capsule (60 mg) by mouth daily With 30mg capsule for total dose of 90mg    Major depressive disorder, recurrent episode, moderate (H), CIERRA (generalized anxiety disorder)       fluconazole 200 MG tablet    DIFLUCAN    15 tablet    Take 2 tabs the first day and 1 tab for the next 13 days        furosemide 20 MG tablet    LASIX    180 tablet    Take 1 tablet (20 mg) by mouth 2 times daily    Edema, unspecified type       glucagon 1 MG kit    GLUCAGON EMERGENCY    1 mg    Inject 1 mg into the muscle once for 1 dose    Hypoglycemia unawareness in type 1 diabetes mellitus (H), Type 1 diabetes mellitus with hypoglycemic coma (H)       hydrocortisone 10 MG tablet    CORTEF    60 tablet    Take 10 mg in the morning and 10 mg at bedtime. Watch for hypoglycemia recurrence.    Hypoglycemia, Adrenal insufficiency (H)       insulin pen needle 31G X 5 MM     2 Box    RX# [id number]  Pen Needle UC 31G UF IV Mini  Use 4-8 needles per day for insulin injections.    Chronic abdominal pain       levothyroxine 112 MCG tablet    SYNTHROID/LEVOTHROID    30 tablet    Take 1 tablet (112 mcg) by mouth daily    Hypothyroidism       linaclotide 145 MCG capsule    LINZESS    30 capsule    Take 1 capsule (145 mcg) by mouth every morning (before breakfast)    Constipation, unspecified constipation type, Chronic back pain, unspecified back location, unspecified back pain laterality, Physical deconditioning, Primary insomnia       metoclopramide 5  MG tablet    REGLAN    180 tablet    Take 2 tablets (10 mg) by mouth 3 times daily    Nausea       nystatin 476614 unit/mL Susp suspension    MYCOSTATIN    60 mL    Take 1 mL (100,000 Units) by mouth 4 times daily    Odynophagia       ondansetron 4 MG ODT tab    ZOFRAN-ODT    60 tablet    DISSOLVE ONE TABLET ON THE TONGUE EVERY 6 HOURS AS NEEDED FOR NAUSEA AND VOMITING    Nausea       order for DME     1 Month    by [address] [place], [place] Ph: [phone] Fax: [phone]  Nutren 1.5 @ 10 ml/hr with advancement by 10 ml/hr q 8 hours to goal rate of 40 ml/hr.  This will provide 1440 kcals (27 kcal/kg/day), 65 g PRO (1.2 g/kg/day), 730 mL H2O, 169 g CHO and no Fiber daily.    Hypoglycemia, Nausea, Decreased oral intake, Exocrine pancreatic insufficiency, Type 1 diabetes mellitus with hypoglycemia and without coma (H), Generalized abdominal pain, Post-pancreatectomy diabetes (H), Cell transplant, On enteral nutrition       pantoprazole 40 MG EC tablet    PROTONIX    180 tablet    Take 1 tablet (40 mg) by mouth 2 times daily    H. pylori infection       polyethylene glycol Packet    MIRALAX/GLYCOLAX    14 each    Take 1 packet by mouth 2 times daily as needed for constipation    Chronic constipation       potassium chloride SA 20 MEQ CR tablet    K-DUR/KLOR-CON M    90 tablet    Take 1 tablet (20 mEq) by mouth daily    Hypokalemia       pregabalin 150 MG capsule    LYRICA    90 capsule    Take 1 capsule (150 mg) by mouth 3 times daily    Chronic generalized abdominal pain       senna-docusate 8.6-50 MG per tablet    SENOKOT-S;PERICOLACE    60 tablet    Take 1-2 tablets by mouth daily as needed for constipation    Constipation       sodium chloride 0.65 % nasal spray    OCEAN    30 mL    Spray 1 spray into both nostrils daily as needed for congestion    Irritation of nose       SUMAtriptan 50 MG tablet    IMITREX    30 tablet    Take 1 tablet (50 mg) by mouth at onset of headache for  migraine Take 1 Tab by mouth Once as needed for Migraine Headache. May repeat after two hours.  Maximum dose 200 mg/24 hours.    Migraine       topiramate 100 MG tablet    TOPAMAX    180 tablet    Take 1 tablet (100 mg) by mouth 2 times daily    Migraine, unspecified, not intractable, without status migrainosus       traZODone 100 MG tablet    DESYREL    100 tablet    TAKE 1-2 TABLETS BY MOUTH AN HOUR BEFORE BEDTIME    Primary insomnia, Constipation, unspecified constipation type, Chronic back pain, unspecified back location, unspecified back pain laterality, Physical deconditioning       Urine Glucose-Ketones Test Strp     30 each    1 strip by In Vitro route 3 times daily Test urine 3 times a day.    Elevated blood sugar       * Notice:  This list has 4 medication(s) that are the same as other medications prescribed for you. Read the directions carefully, and ask your doctor or other care provider to review them with you.

## 2018-09-19 ENCOUNTER — MEDICAL CORRESPONDENCE (OUTPATIENT)
Dept: HEALTH INFORMATION MANAGEMENT | Facility: CLINIC | Age: 53
End: 2018-09-19

## 2018-09-19 ENCOUNTER — OFFICE VISIT (OUTPATIENT)
Dept: TRANSPLANT | Facility: CLINIC | Age: 53
End: 2018-09-19
Attending: PEDIATRICS
Payer: MEDICARE

## 2018-09-19 ENCOUNTER — TELEPHONE (OUTPATIENT)
Dept: TRANSPLANT | Facility: CLINIC | Age: 53
End: 2018-09-19

## 2018-09-19 VITALS
WEIGHT: 138 LBS | TEMPERATURE: 98.4 F | BODY MASS INDEX: 25.4 KG/M2 | HEIGHT: 62 IN | DIASTOLIC BLOOD PRESSURE: 70 MMHG | SYSTOLIC BLOOD PRESSURE: 105 MMHG | HEART RATE: 89 BPM | RESPIRATION RATE: 16 BRPM

## 2018-09-19 DIAGNOSIS — E10.649 HYPOGLYCEMIA UNAWARENESS IN TYPE 1 DIABETES MELLITUS (H): Primary | ICD-10-CM

## 2018-09-19 DIAGNOSIS — E89.1 POST-PANCREATECTOMY DIABETES (H): ICD-10-CM

## 2018-09-19 DIAGNOSIS — E13.9 POST-PANCREATECTOMY DIABETES (H): ICD-10-CM

## 2018-09-19 DIAGNOSIS — Z90.410 POST-PANCREATECTOMY DIABETES (H): ICD-10-CM

## 2018-09-19 PROCEDURE — G0463 HOSPITAL OUTPT CLINIC VISIT: HCPCS | Mod: ZF

## 2018-09-19 ASSESSMENT — PAIN SCALES - GENERAL: PAINLEVEL: MILD PAIN (3)

## 2018-09-19 NOTE — PROGRESS NOTES
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     Post-pancreatectomy diabetes (H)     Type 1 diabetes mellitus with hypoglycemia and without coma (H)     Migraine     Abdominal muscle strain     CIERRA (generalized anxiety disorder)     Major depressive disorder, recurrent episode, moderate (H)     CMC DJD(carpometacarpal degenerative joint disease), localized primary     Exocrine pancreatic insufficiency     Hypothyroidism     Hypoglycemia     Mood disorder due to a general medical condition     Decreased oral intake     Odynophagia     Iron deficiency     Anemia, iron deficiency     Adrenal insufficiency (H)     S/P hernia repair     Nausea     Chondromalacia of left patella       HPI:  Chantell is a 52 year old female here for follow up of total pancreatectomy and islet autotransplant performed on January 6, 2012.  At the time of the procedure, the patient received 420,000 IEQ, or 5,438 IEQ/kg body weight.  She did not have diabetes before the procedure.  Early post-operative course was complicated by RLQ with appendicitis, eventually required appendectomy.    Despite the high islet mass transplanted, Chantell's post-operative course was initially remarkable for high insulin needs.  She in fact does have islet function, as previously documented by mixed meal tests, but she was insulin resistant, requiring high doses of insulin when on MDI therapy.  She also had frequent day to day variability, and fluctuating patterns with illness and healthier times, as well as a complex regimen, all of which make her an excellent candidate for an insulin pump which she started in Feb 2014.  Extremely concerning  was an episode of severe hypoglycemia in November 2013 at which time she was found unconscious.  Suspect at that time she was on too much basal insulin and because she was ill and not eating at the time, dropped far too low overnight.   In early 2014, she was started on an insulin pump with CGM.  Remarkably, her insulin needs have dropped dramatically on an insulin pump.  She was then relatively stable until 2016.  She was again admitted to the hospital on March 15 and March 29, 2016 for hypoglycemia, that appears to be secondary to both exogenous insulin and possible central adrenal insufficiency.   At that time she had the following lab findings:  On 3/29/16, elevated insulin with low C-peptide during BG 42 mg/dL around 5pm, and then again same pattern with BG 50s at 10pm.  Chantell is pretty clear that she did not take insuiln that day on 3/29/16. She also had three cortisol levels-- including one during hypoglycemia, one at around 4am (possibly also during hypoglycemia) and one 8am draw that were all low-- all 0.6- 1.6 range.    Of note, she did have a kenalog injection one week earlier on 3/24/16, just a few days prior to that draw and was also receiving narcotics in hospital (but not at home).  She has since had another severe hypogylcemic episode in Jan 2017 -- did not lose consciousness but was disoriented and unable to get help for herself at the store.  And again was admitted for severe hypoglycemia at [facility] on 11/29/2017 (refer to 12/13/17 note) with labs consistent with exogenous insulin overdose although she denied taking any excess insulin (12/1 C-peptide <0.1 and insulin .64.7, 11/30 C-peptide 0.5 and insulin 91). Additional hypoglycemia admissions: 8/26/18, 9/3/18, with high insulin and low C-peptide c/w hypoglycemia secondary to exogenous insulin (9/3/18 at 6:41pm about 20 hours after last reported insulin dose; insulin 45.6 mU/L, C-peptide 0.2 ng/mL).    Picture is also complicated by  non-diabetes history which includes the following :  - 7/6/2016 EGD identified diffuse candidiasis through entire esophagous.  Pt has also had recurrent oral thrush in 2016  - Recurrent chronic abdominal pain  - Recurrent vomiting of unclear etiology but G-T placed 10/2016 and converted to GJ 11/2016 with symptomatic improvement  Unclear if she has any gastroparesis.  Suboptimal study in March 2016 showed 100% retention at 1 hour and then rapid emptying.    - Hernia repair performed by general surgery 9/15/16 (TPIAT team not involved).    - Chronic abdominal pain      New history today:  1)  Diabetes:  Reason for follow up today are for recent hypoglycemia admissions in past month.  The first was 8/26-8/29/18 and second was 9/3- 9/7/18.  She reports today that this started at same time as high psychosocial stress.  She found out her 28 year old daughter has a chemical dependency with heroin, and this daughter admitted herself to a residential treatment program 3 weeks ago.  Chantell cannot speak with her or visit her for the first 30 days because of the rules of the program. She thinks her hypoglyecmia may have been precipitated by this stress.  I explained her lab results are c/w exogenous insulin and asked her again today if she may have taken extra insulin, particularly with these recent stressors, but she again reports that she did not take any extra insulin doses. I reviewed her pump.  There are no manual boluses entered at time of hypoglycemia. She did change reservoir just prior to onset of hypoglycemia 9/2, with an appropriate tubing and cannula fill amounts. She does not have any more glucagon at home.  She left the hospital on 9/7 off her pump.  She reports that she received a phone call to restart the pump-- is unclear to us who would have called as we have not found documentation of this call.  Leslie Cortez then instructed her to stop her pump again last week.  Chantell reports she stopped and then self  restarted for highs a few days ago, though her pump report seems to indicate she has been using bolus correction for high BG off the pump every day since Friday so it appears she has been on continuously since Friday.    Chantell is now on tube feeds of with Nutren 1.5 at 40 mL/ hour.  She was started on this during her recent hospital admit. She says this was for poor nutrition and not because of hypoglycemia. She is not sure who is managing it, or what the long-term plan is.  She is very uncomfortable with the NJ and has trouble swallowing food with it in, so she does not want to keep the NJ long term.     Pump settings with this are:  Basal rates 12a 0.4, 6:30am 0.5 unit/hour  Bolus: , target .  Has I:C ratio set at 40g but does not use this  Total daily dose (mostly basal) is about 12 unit/day.  Her BG numbers are 126- 363 mg/dL, only one is above 300.  There is one value of 69 mg/dL but this is the only hypoglycemia since leaving the hospital.  She has a dexcom that she is not using.  She says it is not connecting well to the . It is a D5.  I instructed her to call dexcom for assistance. Recommended she uses this given past hx of hypogylcemia.     2)  Adrenal insufficiency:  Still unclear if this was transient or true central AI but we have been treating her regardless due to SHE history.  SWe have maintained her on 20 mg/day (10 BID) of cortef, with Body surface area is 1.65 meters squared.  She is compliant with this.  Because of recent hypoglycemia we are not changing plan with this today, but is unclear if needed long term    3)  Other   - noted she has fosamax on her list with a Rx date of 2016. She says she does still take this once weekly.  She does get some pills 'stuck' in her esophagus but reports she notices this more with the KCl and not as much with the this pill  - has some significant psychosocial stress as noted above.  Not currently working with psychology despite our past  conversations regarding this.  Say she is still willing.  Distance is an issue for her so recommended she be seen at a private psychology clinic in [place] that would be closer.      Review of systems:  Review Of Systems  Skin: negative  Eyes: negative  Ears/Nose/Throat: negative  Respiratory: No shortness of breath, dyspnea on exertion, cough, or hemoptysis  Cardiovascular: negative  Gastrointestinal: chronic abdominal pain + hernia  Genitourinary: negative  Musculoskeletal: recent back pain  Neurologic: negative  Psychiatric: negative  Hematologic/Lymphatic/Immunologic: negative  Endocrine: as above    Past Medical and Surgical History:  Past Medical History:   Diagnosis Date     Chronic abdominal pain      Chronic pancreatitis (H)     S/P pancreatectomy     Depression with anxiety      Diabetes mellitus (H) 1/2012     Gastro-oesophageal reflux disease      Hypothyroidism 4/23/2015     Kidney stones      Low serum cortisol level (H)      Migraines      Other chronic pain     STOMACH     Other chronic pain     LUMBAR SPINE     Spasm of sphincter of Oddi      Past Surgical History:   Procedure Laterality Date     ARTHROPLASTY CARPOMETACARPAL (THUMB JOINT)  5/2/2014    Procedure: ARTHROPLASTY CARPOMETACARPAL (THUMB JOINT);  Surgeon: Carina Panda MD;  Location: [facility] OR     CHOLECYSTECTOMY  2004     COLONOSCOPY  7/18/2014    Procedure: COLONOSCOPY;  Surgeon: Aurora Sahu MD;  Location:  GI     COLONOSCOPY N/A 8/1/2017    Procedure: COLONOSCOPY;  Colonoscopy and upper endoscopy;  Surgeon: Deirdre Harris MD;  Location:  GI     ENDOSCOPIC RETROGRADE CHOLANGIOPANCREATOGRAM       ENDOSCOPIC RETROGRADE CHOLANGIOPANCREATOGRAM  4/19/2011    Procedure:ENDOSCOPIC RETROGRADE CHOLANGIOPANCREATOGRAM; Pancreatic Stent Placement       ENDOSCOPIC RETROGRADE CHOLANGIOPANCREATOGRAM  5/26/2011    Procedure:ENDOSCOPIC RETROGRADE CHOLANGIOPANCREATOGRAM; with Pancreatic Stent Removal;  Surgeon:DALE MIMS; Location:[facility] OR     ENDOSCOPY UPPER, COLONOSCOPY, COMBINED  4/25/2012    Procedure:COMBINED ENDOSCOPY UPPER, COLONOSCOPY; Enteroscopy with Bile Duct Stent Removal, Colonoscopy  *[facility] Safe Room*; Surgeon:GRACY GODWIN; Location:[facility]     ESOPHAGOSCOPY, GASTROSCOPY, DUODENOSCOPY (EGD), COMBINED  5/26/2011    Procedure:COMBINED ESOPHAGOSCOPY, GASTROSCOPY, DUODENOSCOPY (EGD); Surgeon:DALE MIMS; [facility]     ESOPHAGOSCOPY, GASTROSCOPY, DUODENOSCOPY (EGD), COMBINED N/A 10/30/2014    Procedure: COMBINED ESOPHAGOSCOPY, GASTROSCOPY, DUODENOSCOPY (EGD), BIOPSY SINGLE OR MULTIPLE;  Surgeon: Sarai Moon MD;  Location: [facility] GI     ESOPHAGOSCOPY, GASTROSCOPY, DUODENOSCOPY (EGD), COMBINED Left 7/6/2015    Procedure: COMBINED ESOPHAGOSCOPY, GASTROSCOPY, DUODENOSCOPY (EGD), BIOPSY SINGLE OR MULTIPLE;  Surgeon: Thomas Estrada MD;  [facility]: [facility]     ESOPHAGOSCOPY, GASTROSCOPY, DUODENOSCOPY (EGD), COMBINED N/A 7/8/2016    Procedure: COMBINED ESOPHAGOSCOPY, GASTROSCOPY, DUODENOSCOPY (EGD), BIOPSY SINGLE OR MULTIPLE;  Surgeon: Eloy Klein MD;  Location:  GI     ESOPHAGOSCOPY, GASTROSCOPY, DUODENOSCOPY (EGD), COMBINED N/A 8/4/2016    Procedure: COMBINED ESOPHAGOSCOPY, GASTROSCOPY, DUODENOSCOPY (EGD), BIOPSY SINGLE OR MULTIPLE;  Surgeon: Jason Brown MD;  Location:  GI     ESOPHAGOSCOPY, GASTROSCOPY, DUODENOSCOPY (EGD), COMBINED N/A 8/1/2017    Procedure: COMBINED ESOPHAGOSCOPY, GASTROSCOPY, DUODENOSCOPY (EGD);;  Surgeon: Deirdre Harris MD;  Location:  GI     GYN SURGERY      Hysterectomy and USO     [facility] ENDOSCOPY W EUS  7/20/2011    Procedure:COMBINED ENDOSCOPIC ULTRASOUND, ESOPHAGOSCOPY, GASTROSCOPY, DUODENOSCOPY (EGD); Surgeon:DARVIN DONOHUE; Location: GI     HERNIORRHAPHY VENTRAL N/A 9/15/2016    Procedure: HERNIORRHAPHY VENTRAL;  Surgeon: Juanita Bernabe MD;  [facility]     HYSTERECTOMY   "1997 or 1998    USO     INCISION AND DRAINAGE ABDOMEN WASHOUT, COMBINED  8/16/2012    Procedure: COMBINED INCISION AND DRAINAGE ABDOMEN WASHOUT;  ,debridement and Drainage Post Appendectomy;  Surgeon: Ron Austin MD;  [facility]     INJECT TRANSVERSUS ABDOMINIS PLANE (TAP) BLOCK BILATERAL Bilateral 5/26/2016    Procedure: INJECT TRANSVERSUS ABDOMINIS PLANE (TAP) BLOCK BILATERAL;  Surgeon: Leonard Mccallum MD;  [facility]     LAPAROSCOPIC APPENDECTOMY  7/30/2012    Procedure: LAPAROSCOPIC APPENDECTOMY;  Open Appendectomy;  Surgeon: Ron Austin MD;  [facility]     PANCREATECTOMY, TRANSPLANT AUTO ISLET CELL, COMBINED  1/6/2012    Procedure:COMBINED PANCREATECTOMY, TRANSPLANT AUTO ISLET CELL; Total  Pancreatectomy, Auto Islet Transplant, splenectomy, 18fr. transgastric-jejunal feeding tube placement, liver biopsy; Surgeon:PALAK LEE; Location[facility]     REPLACE GASTROSTOMY TUBE, PERCUTANEOUS N/A 8/30/2017    Procedure: REPLACE GASTROSTOMY TUBE, PERCUTANEOUS;  GJ Tube Change;  Surgeon: Jose Nath PA-C;  [facility]: [facility]     SPLENECTOMY         Family History:  New changes since last visit:  none  Family History   Problem Relation Age of Onset     Hypertension Mother      Diabetes Mother      Osteoporosis Mother      Cancer Father      pancreatic cancer     Diabetes Maternal Grandmother      Cardiovascular Maternal Grandmother      Cancer Maternal Grandfather      lung cancer     Cancer Sister      brain     Cancer Sister      liver cancer       Social History:  Social History     Social History Narrative      Lives in [place] with her . Her daughter has recent history of new addiction that has created family stress.      Physical Exam:  Vitals: /70  Pulse 89  Temp 98.4  F (36.9  C) (Oral)  Resp 16  Ht 1.575 m (5' 2\")  Wt 62.6 kg (138 lb)  BMI 25.24 kg/m2  BMI= Body mass index is 25.24 kg/(m^2).     General:  Well-appearing, NAD  Head: " NC/AT  Eyes: sclera white  Neuro:  Normal mental status, normal gross motor  Psych:  Communicative, with normal affect     Results:    Lab Results   Component Value Date    A1C 6.6 08/27/2018    A1C 7.5 02/01/2018    A1C 6.7 12/13/2017    A1C 6.4 10/19/2017    A1C 5.9 01/19/2017       C-[id number]/1/2017  [facility]   Component Name Value Ref Range   C-PEPTIDE  <0.1 (L)   Comment:    Test Performed by:  [facility]  [address] [place] [address] 1.1 - 4.4 ng/mL    Specimen   Blood     Simultaneous glucose 12/1/17 = 81 mg/dL      Assessment:  1. 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  Treated for central adrenal insufficiency.  3.  Severe hypoglycemia secondary to exogenous insulin  4.  Malnutrition or poor oral intake on NJ feeds.      Chantell is a 52 year old with history of chronic pancreatitis who is s/p total pancreatectomy and islet autotransplant, with insulin dependence (pump therapy) complicated by insulin resistance, and several episodes of severe hypoglycemia and seizures (E10.641).  She is treated with a [employer] covered continuous subcutaneous insulin infusion pump.    Chantell has post-pancreatectomy diabetes-- essentially a surgical form of type 1 diabetes (E10.641).  She requires an insulin pump for management due to her multiple episodes of hypoglycemia and hypoglycemia unawareness, including episodes of seizures that required suspension of insulin pump and hospital admits. She also should have a continuous glucose monitor for frequent monitoring because of her severe hypoglycemia history.  Chantell continues to measure her BG 4-6 times every day, which we documented during the clinic encounter today from review of her logs and her meter download.    Chantell requires frequent insulin adjustments to her insulin regimen based on her blood glucoses to control hyperglycemia and hypoglycemia.     At  today's visit, I would like to reduce her insulin dosing as instructed below and add an oral medication. My goal is to minimize insulin or ideally get her off insulin if we can do safely, because she is at such high risk for having recurrent hypoglycemia episodes if she is on exogenous insulin. Recent stressors may have been contributing to these episodes and so I have again discussed with Chantell need to get in for regular psychology care.  Also discussed her case with dietitian with regards to need for nutritional goals and plan for tube feeds.  Either needs to wean off and get NJ out, or change to GJ if needs long term tube feeds.  Our nurse will d/w primary care clinic regarding who is in charge of long term management of this from MD side.  Specifically discussed with Chantell that she must tell me if there is a change in the feeds since it will affect her diabetes regimen.    Plan:  1.  Changes to current diabetes regimen:  Patient Instructions   1)  I am concerned about safety of insulin given the number of hypoglycemia episodes that you have had.  These seem to be related to exogenous insulin since the insulin levels are high and the C peptide levels are low (-- means that your body is not making that insulin, it is coming from insulin analogs).      2)  We are starting an oral medication to try to reduce or even wean off insulin.  This new oral medicine is called Invokana 100 mg tablet daily.  (If your insurance does not cover this one, there are two other brands of the same class of medication on the market).  This helps you excrete more glucose in your urine so that the blood glucose is not high.     -- Watch for urinary symptoms-- since you have more sugar in your urine, it would be normal to urinate a little more frequently, but seek medical attention if you have symptoms of a urinary tract infection.  -- the other risk is that if you had ketones, you may not have a very high blood sugar with the ketones, so  we need to watch more closely for this.  For a while, use the urine sticks to check ketones once daily and let us know right away if it is more than trace. (should be negative or trace).  Then symptoms of ketones would be persistent vomiting, worsening abdominal pain, altered mental status.  So if your abdominal symptoms are worse than your usual baseline, you should check ketones.    3)  We are changing pump settings to lower insulin now than you have been using past few days, and potentially stopping insulin depending on how it goes with the medication.  Current doses:  12am 0.25 u/hour;  6:30am 0.30 units/hour.  You can use your correction dose which is 1 unit per 100 mg/dL above 140 mg/dL but would not use carb coverage for now.    4)  Keep hydrocortisone at 10 mg twice daily.    5)  Watch on the fosamax for pill feeling stuck or not going down as it can irritate the esophagus.  I have had TPIAT patients in the past get IV infusions instead of the oral medication because of concerns about GI motility and risk for esophagus irritation.  This medication is prescribed currently through your primary care clinic.     6)  Email me every couple days to check in with numbers for a while.    7)  Would be a good time to get into a psychologist or counselor to deal with your extra stress recently.  There is one in [place]:  [facility] is right off [address].  Call [phone] to start process.     Follow up 10:40am on Dec 20 (thurs)    Need dietitian visit-- I will give rod a call now      2.  Frequency of blood sugar checks:  Premeal, bedtime, and additional measurements PRN-- Should have strips sufficient to test 8 times per day given her labiltiy history.    3.  Continue routine follow up for autoislet transplant patients:  Mixed meal test (6 mL/kg BoostHP to max of 360 mL) at 3 months, 6 months, and once a year post transplant.  Hemoglobin A1c levels at these time points and  quarterly.    4.  Other issues addressed today:  As above        Follow up: as above    Contact me for questions at [phone] or [phone].  Emergency number to reach pediatric endocrinology after hours is [phone].        Amena Maher MD  , Pediatric Endocrinology and Diabetes  [facility] [place]  [facility]      VISIT TIME:  40 minutes of face to face time, >50% spent in counseling and coordination of care

## 2018-09-19 NOTE — NURSING NOTE
"Chief Complaint   Patient presents with     RECHECK     Auto islet follow up     /70  Pulse 89  Temp 98.4  F (36.9  C) (Oral)  Resp 16  Ht 1.575 m (5' 2\")  Wt 62.6 kg (138 lb)  BMI 25.24 kg/m2  JAKOB BECERRA CMA    "

## 2018-09-19 NOTE — TELEPHONE ENCOUNTER
Called pt. Pt left voicemail for me that her current NJ (placed early Sept inpatient for vomiting and poor PO) is causing her extreme pain. She is unable to eat, drink. Painful to lay down, etc. She was instructed to try nasal spray, but this has not helped at all. Unfortunately, it is unclear who is managing her tube feeds (if anyone) upon discharge. She is being followed by [facility] for tube feed formula and supplies.     Pt reports ongoing vomiting that waxes and wanes. She feels as if she would be unable to maintain her nutrition on PO intakes alone, without a feeding tube. Pt would like a GJ tube. Pt currently on Nutren 1.5 @ 40 ml/hr. Pt has h/o severe hypoglycemic episodes, with a few admissions for this recently. Pt has previously been on Isosource 1.5. Pt reports she thinks she is tolerating her current formula, but hard to tell with vomiting 2/2 issues with pain and current NJT. These issues were not present in the hospital, but arose after discharge, pt unsure why these issues arose when she was doing ok with FT in the hospital.     Called pt's post islet coordinator, Leslie, to discuss recommendations and formulate a plan. Will remove NJT under medical supervision and plan to place GJ tube. Also plan to see patient to assess nutrition status and monitor PO intakes and tube feed tolerance, etc.

## 2018-09-19 NOTE — PATIENT INSTRUCTIONS
1)  I am concerned about safety of insulin given the number of hypoglycemia episodes that you have had.  These seem to be related to exogenous insulin since the insulin levels are high and the C peptide levels are low (-- means that your body is not making that insulin, it is coming from insulin analogs).      2)  We are starting an oral medication to try to reduce or even wean off insulin.  This new oral medicine is called Invokana 100 mg tablet daily.  (If your insurance does not cover this one, there are two other brands of the same class of medication on the market).  This helps you excrete more glucose in your urine so that the blood glucose is not high.     -- Watch for urinary symptoms-- since you have more sugar in your urine, it would be normal to urinate a little more frequently, but seek medical attention if you have symptoms of a urinary tract infection.  -- the other risk is that if you had ketones, you may not have a very high blood sugar with the ketones, so we need to watch more closely for this.  For a while, use the urine sticks to check ketones once daily and let us know right away if it is more than trace. (should be negative or trace).  Then symptoms of ketones would be persistent vomiting, worsening abdominal pain, altered mental status.  So if your abdominal symptoms are worse than your usual baseline, you should check ketones.    3)  We are changing pump settings to lower insulin now than you have been using past few days, and potentially stopping insulin depending on how it goes with the medication.  Current doses:  12am 0.25 u/hour;  6:30am 0.30 units/hour.  You can use your correction dose which is 1 unit per 100 mg/dL above 140 mg/dL but would not use carb coverage for now.    4)  Keep hydrocortisone at 10 mg twice daily.    5)  Watch on the fosamax for pill feeling stuck or not going down as it can irritate the esophagus.  I have had TPIAT patients in the past get IV infusions instead of  the oral medication because of concerns about GI motility and risk for esophagus irritation.  This medication is prescribed currently through your primary care clinic.     6)  Email me every couple days to check in with numbers for a while.    7)  Would be a good time to get into a psychologist or counselor to deal with your extra stress recently.  There is one in [place]:  [facility] is right off [address].  Call [phone] to start process.     Follow up 10:40am on Dec 20 (thurs)    Need dietitian visit-- I will give rod a call now

## 2018-09-19 NOTE — LETTER
9/19/2018       RE: Chantell Kidd  [address]  [facility] [address]     Dear Colleague,    Thank you for referring your patient, Chantell Kidd, to the [facility] SOLID ORGAN TRANSPLANT at [facility]. Please see a copy of my visit note below.    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     Post-pancreatectomy diabetes (H)     Type 1 diabetes mellitus with hypoglycemia and without coma (H)     Migraine     Abdominal muscle strain     CIERRA (generalized anxiety disorder)     Major depressive disorder, recurrent episode, moderate (H)     CMC DJD(carpometacarpal degenerative joint disease), localized primary     Exocrine pancreatic insufficiency     Hypothyroidism     Hypoglycemia     Mood disorder due to a general medical condition     Decreased oral intake     Odynophagia     Iron deficiency     Anemia, iron deficiency     Adrenal insufficiency (H)     S/P hernia repair     Nausea     Chondromalacia of left patella       HPI:  Chantell is a 52 year old female here for follow up of total pancreatectomy and islet autotransplant performed on January 6, 2012.  At the time of the procedure, the patient received 420,000 IEQ, or 5,438 IEQ/kg body weight.  She did not have diabetes before the procedure.  Early post-operative course was complicated by RLQ with appendicitis, eventually required appendectomy.    Despite the high islet mass transplanted, Chantell's post-operative course was initially remarkable for high insulin needs.  She in fact does have islet function, as previously documented by mixed meal tests, but she was insulin resistant, requiring high  doses of insulin when on MDI therapy.  She also had frequent day to day variability, and fluctuating patterns with illness and healthier times, as well as a complex regimen, all of which make her an excellent candidate for an insulin pump which she started in Feb 2014.  Extremely concerning was an episode of severe hypoglycemia in November 2013 at which time she was found unconscious.  Suspect at that time she was on too much basal insulin and because she was ill and not eating at the time, dropped far too low overnight.   In early 2014, she was started on an insulin pump with CGM.  Remarkably, her insulin needs have dropped dramatically on an insulin pump.  She was then relatively stable until 2016.  She was again admitted to the hospital on March 15 and March 29, 2016 for hypoglycemia, that appears to be secondary to both exogenous insulin and possible central adrenal insufficiency.   At that time she had the following lab findings:  On 3/29/16, elevated insulin with low C-peptide during BG 42 mg/dL around 5pm, and then again same pattern with BG 50s at 10pm.  Chantell is pretty clear that she did not take insuiln that day on 3/29/16. She also had three cortisol levels-- including one during hypoglycemia, one at around 4am (possibly also during hypoglycemia) and one 8am draw that were all low-- all 0.6- 1.6 range.    Of note, she did have a kenalog injection one week earlier on 3/24/16, just a few days prior to that draw and was also receiving narcotics in hospital (but not at home).  She has since had another severe hypogylcemic episode in Jan 2017 -- did not lose consciousness but was disoriented and unable to get help for herself at the store.  And again was admitted for severe hypoglycemia at [facility] on 11/29/2017 (refer to 12/13/17 note) with labs consistent with exogenous insulin overdose although she denied taking any excess insulin (12/1 C-peptide <0.1 and insulin .64.7, 11/30 C-peptide 0.5 and insulin  91). Additional hypoglycemia admissions: 8/26/18, 9/3/18, with high insulin and low C-peptide c/w hypoglycemia secondary to exogenous insulin (9/3/18 at 6:41pm about 20 hours after last reported insulin dose; insulin 45.6 mU/L, C-peptide 0.2 ng/mL).    Picture is also complicated by non-diabetes history which includes the following :  - 7/6/2016 EGD identified diffuse candidiasis through entire esophagous.  Pt has also had recurrent oral thrush in 2016  - Recurrent chronic abdominal pain  - Recurrent vomiting of unclear etiology but G-T placed 10/2016 and converted to GJ 11/2016 with symptomatic improvement  Unclear if she has any gastroparesis.  Suboptimal study in March 2016 showed 100% retention at 1 hour and then rapid emptying.    - Hernia repair performed by general surgery 9/15/16 (TPIAT team not involved).    - Chronic abdominal pain      New history today:  1)  Diabetes:  Reason for follow up today are for recent hypoglycemia admissions in past month.  The first was 8/26-8/29/18 and second was 9/3- 9/7/18.  She reports today that this started at same time as high psychosocial stress.  She found out her 28 year old daughter has a chemical dependency with heroin, and this daughter admitted herself to a residential treatment program 3 weeks ago.  Chantell cannot speak with her or visit her for the first 30 days because of the rules of the program. She thinks her hypoglyecmia may have been precipitated by this stress.  I explained her lab results are c/w exogenous insulin and asked her again today if she may have taken extra insulin, particularly with these recent stressors, but she again reports that she did not take any extra insulin doses. I reviewed her pump.  There are no manual boluses entered at time of hypoglycemia. She did change reservoir just prior to onset of hypoglycemia 9/2, with an appropriate tubing and cannula fill amounts. She does not have any more glucagon at home.  She left the hospital on 9/7  off her pump.  She reports that she received a phone call to restart the pump-- is unclear to us who would have called as we have not found documentation of this call.  Leslie Cortez then instructed her to stop her pump again last week.  Chantell reports she stopped and then self restarted for highs a few days ago, though her pump report seems to indicate she has been using bolus correction for high BG off the pump every day since Friday so it appears she has been on continuously since Friday.    Chantell is now on tube feeds of with Nutren 1.5 at 40 mL/ hour.  She was started on this during her recent hospital admit. She says this was for poor nutrition and not because of hypoglycemia. She is not sure who is managing it, or what the long-term plan is.  She is very uncomfortable with the NJ and has trouble swallowing food with it in, so she does not want to keep the NJ long term.     Pump settings with this are:  Basal rates 12a 0.4, 6:30am 0.5 unit/hour  Bolus: , target .  Has I:C ratio set at 40g but does not use this  Total daily dose (mostly basal) is about 12 unit/day.  Her BG numbers are 126- 363 mg/dL, only one is above 300.  There is one value of 69 mg/dL but this is the only hypoglycemia since leaving the hospital.  She has a dexcom that she is not using.  She says it is not connecting well to the . It is a D5.  I instructed her to call dexcom for assistance. Recommended she uses this given past hx of hypogylcemia.     2)  Adrenal insufficiency:  Still unclear if this was transient or true central AI but we have been treating her regardless due to SHE history.  SWe have maintained her on 20 mg/day (10 BID) of cortef, with Body surface area is 1.65 meters squared.  She is compliant with this.  Because of recent hypoglycemia we are not changing plan with this today, but is unclear if needed long term    3)  Other   - noted she has fosamax on her list with a Rx date of 2016. She says she does still  take this once weekly.  She does get some pills 'stuck' in her esophagus but reports she notices this more with the KCl and not as much with the this pill  - has some significant psychosocial stress as noted above.  Not currently working with psychology despite our past conversations regarding this.  Say she is still willing.  Distance is an issue for her so recommended she be seen at a private psychology clinic in [place] that would be closer.      Review of systems:  Review Of Systems  Skin: negative  Eyes: negative  Ears/Nose/Throat: negative  Respiratory: No shortness of breath, dyspnea on exertion, cough, or hemoptysis  Cardiovascular: negative  Gastrointestinal: chronic abdominal pain + hernia  Genitourinary: negative  Musculoskeletal: recent back pain  Neurologic: negative  Psychiatric: negative  Hematologic/Lymphatic/Immunologic: negative  Endocrine: as above    Past Medical and Surgical History:  Past Medical History:   Diagnosis Date     Chronic abdominal pain      Chronic pancreatitis (H)     S/P pancreatectomy     Depression with anxiety      Diabetes mellitus (H) 1/2012     Gastro-oesophageal reflux disease      Hypothyroidism 4/23/2015     Kidney stones      Low serum cortisol level (H)      Migraines      Other chronic pain     STOMACH     Other chronic pain     LUMBAR SPINE     Spasm of sphincter of Oddi      Past Surgical History:   Procedure Laterality Date     ARTHROPLASTY CARPOMETACARPAL (THUMB JOINT)  5/2/2014    Procedure: ARTHROPLASTY CARPOMETACARPAL (THUMB JOINT);  Surgeon: Carina Panda MD;  Location: [facility] OR     CHOLECYSTECTOMY  2004     COLONOSCOPY  7/18/2014    Procedure: COLONOSCOPY;  Surgeon: Aurora Sahu MD;  Location:  GI     COLONOSCOPY N/A 8/1/2017    Procedure: COLONOSCOPY;  Colonoscopy and upper endoscopy;  Surgeon: Deirdre Harris MD;  Location:  GI     ENDOSCOPIC RETROGRADE CHOLANGIOPANCREATOGRAM       ENDOSCOPIC RETROGRADE  CHOLANGIOPANCREATOGRAM  4/19/2011    Procedure:ENDOSCOPIC RETROGRADE CHOLANGIOPANCREATOGRAM; Pancreatic Stent Placement       ENDOSCOPIC RETROGRADE CHOLANGIOPANCREATOGRAM  5/26/2011    Procedure:ENDOSCOPIC RETROGRADE CHOLANGIOPANCREATOGRAM; with Pancreatic Stent Removal; Surgeon:DALE MIMS; Location:[facility] OR     ENDOSCOPY UPPER, COLONOSCOPY, COMBINED  4/25/2012    Procedure:COMBINED ENDOSCOPY UPPER, COLONOSCOPY; Enteroscopy with Bile Duct Stent Removal, Colonoscopy  *Latex Safe Room*; Surgeon:GRACY GODWIN; Location:[facility]     ESOPHAGOSCOPY, GASTROSCOPY, DUODENOSCOPY (EGD), COMBINED  5/26/2011    Procedure:COMBINED ESOPHAGOSCOPY, GASTROSCOPY, DUODENOSCOPY (EGD); Surgeon:DALE MIMS; Location[facility]     ESOPHAGOSCOPY, GASTROSCOPY, DUODENOSCOPY (EGD), COMBINED N/A 10/30/2014    Procedure: COMBINED ESOPHAGOSCOPY, GASTROSCOPY, DUODENOSCOPY (EGD), BIOPSY SINGLE OR MULTIPLE;  Surgeon: Sarai Moon MD;  Location:  GI     ESOPHAGOSCOPY, GASTROSCOPY, DUODENOSCOPY (EGD), COMBINED Left 7/6/2015    Procedure: COMBINED ESOPHAGOSCOPY, GASTROSCOPY, DUODENOSCOPY (EGD), BIOPSY SINGLE OR MULTIPLE;  Surgeon: Thomas Estrada MD;  Location:  GI     ESOPHAGOSCOPY, GASTROSCOPY, DUODENOSCOPY (EGD), COMBINED N/A 7/8/2016    Procedure: COMBINED ESOPHAGOSCOPY, GASTROSCOPY, DUODENOSCOPY (EGD), BIOPSY SINGLE OR MULTIPLE;  Surgeon: Eloy Klein MD;  Location:  GI     ESOPHAGOSCOPY, GASTROSCOPY, DUODENOSCOPY (EGD), COMBINED N/A 8/4/2016    Procedure: COMBINED ESOPHAGOSCOPY, GASTROSCOPY, DUODENOSCOPY (EGD), BIOPSY SINGLE OR MULTIPLE;  Surgeon: Jason Brown MD;  Location:  GI     ESOPHAGOSCOPY, GASTROSCOPY, DUODENOSCOPY (EGD), COMBINED N/A 8/1/2017    Procedure: COMBINED ESOPHAGOSCOPY, GASTROSCOPY, DUODENOSCOPY (EGD);;  Surgeon: Deirdre Harris MD;  Location:  GI     GYN SURGERY      Hysterectomy and USO     [facility] ENDOSCOPY W EUS  7/20/2011     Procedure:COMBINED ENDOSCOPIC ULTRASOUND, ESOPHAGOSCOPY, GASTROSCOPY, DUODENOSCOPY (EGD); Surgeon:DARVIN DONOHUE; Location:[facility]     HERNIORRHAPHY VENTRAL N/A 9/15/2016    Procedure: HERNIORRHAPHY VENTRAL;  Surgeon: Juanita Bernabe MD;  [facility]: [facility] OR     HYSTERECTOMY  1997 or 1998    USO     INCISION AND DRAINAGE ABDOMEN WASHOUT, COMBINED  8/16/2012    Procedure: COMBINED INCISION AND DRAINAGE ABDOMEN WASHOUT;  ,debridement and Drainage Post Appendectomy;  Surgeon: Ron Austin MD;  [facility]: [facility] OR     INJECT TRANSVERSUS ABDOMINIS PLANE (TAP) BLOCK BILATERAL Bilateral 5/26/2016    Procedure: INJECT TRANSVERSUS ABDOMINIS PLANE (TAP) BLOCK BILATERAL;  Surgeon: Leonard Mccallum MD;  [facility]     LAPAROSCOPIC APPENDECTOMY  7/30/2012    Procedure: LAPAROSCOPIC APPENDECTOMY;  Open Appendectomy;  Surgeon: Ron Austin MD;  [facility]: [facility]     PANCREATECTOMY, TRANSPLANT AUTO ISLET CELL, COMBINED  1/6/2012    Procedure:COMBINED PANCREATECTOMY, TRANSPLANT AUTO ISLET CELL; Total  Pancreatectomy, Auto Islet Transplant, splenectomy, 18fr. transgastric-jejunal feeding tube placement, liver biopsy; Surgeon:PALAK LEE; Location:[facility] OR     REPLACE GASTROSTOMY TUBE, PERCUTANEOUS N/A 8/30/2017    Procedure: REPLACE GASTROSTOMY TUBE, PERCUTANEOUS;  GJ Tube Change;  Surgeon: Jose Nath PA-C;  Location: [facility]     SPLENECTOMY         Family History:  New changes since last visit:  none  Family History   Problem Relation Age of Onset     Hypertension Mother      Diabetes Mother      Osteoporosis Mother      Cancer Father      pancreatic cancer     Diabetes Maternal Grandmother      Cardiovascular Maternal Grandmother      Cancer Maternal Grandfather      lung cancer     Cancer Sister      brain     Cancer Sister      liver cancer       Social History:  Social History     Social History Narrative      Lives in [place] with her . Her daughter has recent history of new  "addiction that has created family stress.      Physical Exam:  Vitals: /70  Pulse 89  Temp 98.4  F (36.9  C) (Oral)  Resp 16  Ht 1.575 m (5' 2\")  Wt 62.6 kg (138 lb)  BMI 25.24 kg/m2  BMI= Body mass index is 25.24 kg/(m^2).     General:  Well-appearing, NAD  Head: NC/AT  Eyes: sclera white  Neuro:  Normal mental status, normal gross motor  Psych:  Communicative, with normal affect     Results:    Lab Results   Component Value Date    A1C 6.6 08/27/2018    A1C 7.5 02/01/2018    A1C 6.7 12/13/2017    A1C 6.4 10/19/2017    A1C 5.9 01/19/2017       C-Okbputh21/1/2017  [facility]   Component Name Value Ref Range   C-PEPTIDE  <0.1 (L)   Comment:    Test Performed by:  [facility] [address]  [address] [place] [address] 1.1 - 4.4 ng/mL    Specimen   Blood     Simultaneous glucose 12/1/17 = 81 mg/dL      Assessment:  1. 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  Treated for central adrenal insufficiency.  3.  Severe hypoglycemia secondary to exogenous insulin  4.  Malnutrition or poor oral intake on NJ feeds.      Chantell is a 52 year old with history of chronic pancreatitis who is s/p total pancreatectomy and islet autotransplant, with insulin dependence (pump therapy) complicated by insulin resistance, and several episodes of severe hypoglycemia and seizures (E10.641).  She is treated with a [employer] covered continuous subcutaneous insulin infusion pump.    Chantell has post-pancreatectomy diabetes-- essentially a surgical form of type 1 diabetes (E10.641).  She requires an insulin pump for management due to her multiple episodes of hypoglycemia and hypoglycemia unawareness, including episodes of seizures that required suspension of insulin pump and hospital admits. She also should have a continuous glucose monitor for frequent monitoring because of her severe hypoglycemia history.  Chantell continues to measure her " BG 4-6 times every day, which we documented during the clinic encounter today from review of her logs and her meter download.    Chantell requires frequent insulin adjustments to her insulin regimen based on her blood glucoses to control hyperglycemia and hypoglycemia.     At today's visit, I would like to reduce her insulin dosing as instructed below and add an oral medication. My goal is to minimize insulin or ideally get her off insulin if we can do safely, because she is at such high risk for having recurrent hypoglycemia episodes if she is on exogenous insulin. Recent stressors may have been contributing to these episodes and so I have again discussed with Chantell need to get in for regular psychology care.  Also discussed her case with dietitian with regards to need for nutritional goals and plan for tube feeds.  Either needs to wean off and get NJ out, or change to GJ if needs long term tube feeds.  Our nurse will d/w primary care clinic regarding who is in charge of long term management of this from MD side.  Specifically discussed with Chantell that she must tell me if there is a change in the feeds since it will affect her diabetes regimen.    Plan:  1.  Changes to current diabetes regimen:  Patient Instructions   1)  I am concerned about safety of insulin given the number of hypoglycemia episodes that you have had.  These seem to be related to exogenous insulin since the insulin levels are high and the C peptide levels are low (-- means that your body is not making that insulin, it is coming from insulin analogs).      2)  We are starting an oral medication to try to reduce or even wean off insulin.  This new oral medicine is called Invokana 100 mg tablet daily.  (If your insurance does not cover this one, there are two other brands of the same class of medication on the market).  This helps you excrete more glucose in your urine so that the blood glucose is not high.     -- Watch for urinary symptoms-- since you  have more sugar in your urine, it would be normal to urinate a little more frequently, but seek medical attention if you have symptoms of a urinary tract infection.  -- the other risk is that if you had ketones, you may not have a very high blood sugar with the ketones, so we need to watch more closely for this.  For a while, use the urine sticks to check ketones once daily and let us know right away if it is more than trace. (should be negative or trace).  Then symptoms of ketones would be persistent vomiting, worsening abdominal pain, altered mental status.  So if your abdominal symptoms are worse than your usual baseline, you should check ketones.    3)  We are changing pump settings to lower insulin now than you have been using past few days, and potentially stopping insulin depending on how it goes with the medication.  Current doses:  12am 0.25 u/hour;  6:30am 0.30 units/hour.  You can use your correction dose which is 1 unit per 100 mg/dL above 140 mg/dL but would not use carb coverage for now.    4)  Keep hydrocortisone at 10 mg twice daily.    5)  Watch on the fosamax for pill feeling stuck or not going down as it can irritate the esophagus.  I have had TPIAT patients in the past get IV infusions instead of the oral medication because of concerns about GI motility and risk for esophagus irritation.  This medication is prescribed currently through your primary care clinic.     6)  Email me every couple days to check in with numbers for a while.    7)  Would be a good time to get into a psychologist or counselor to deal with your extra stress recently.  There is one in [place]:  [facility] is right off [address].  Call [phone] to start process.     Follow up 10:40am on Dec 20 (thurs)    Need dietitian visit-- I will give rod a call now      2.  Frequency of blood sugar checks:  Premeal, bedtime, and additional measurements PRN-- Should have strips sufficient to  test 8 times per day given her labiltiy history.    3.  Continue routine follow up for autoislet transplant patients:  Mixed meal test (6 mL/kg BoostHP to max of 360 mL) at 3 months, 6 months, and once a year post transplant.  Hemoglobin A1c levels at these time points and quarterly.    4.  Other issues addressed today:  As above        Follow up: as above    Contact me for questions at [phone] or [phone].  Emergency number to reach pediatric endocrinology after hours is [phone].        Amena Maher MD  , Pediatric Endocrinology and Diabetes  [facility] [place]  [facility]      VISIT TIME:  40 minutes of face to face time, >50% spent in counseling and coordination of care    Again, thank you for allowing me to participate in the care of your patient.      Sincerely,    Amena Maher MD

## 2018-09-19 NOTE — LETTER
9/19/2018      RE: Chantell Kidd  [address]  [facility] [address]       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     Post-pancreatectomy diabetes (H)     Type 1 diabetes mellitus with hypoglycemia and without coma (H)     Migraine     Abdominal muscle strain     CIERRA (generalized anxiety disorder)     Major depressive disorder, recurrent episode, moderate (H)     CMC DJD(carpometacarpal degenerative joint disease), localized primary     Exocrine pancreatic insufficiency     Hypothyroidism     Hypoglycemia     Mood disorder due to a general medical condition     Decreased oral intake     Odynophagia     Iron deficiency     Anemia, iron deficiency     Adrenal insufficiency (H)     S/P hernia repair     Nausea     Chondromalacia of left patella       HPI:  Chantell is a 52 year old female here for follow up of total pancreatectomy and islet autotransplant performed on January 6, 2012.  At the time of the procedure, the patient received 420,000 IEQ, or 5,438 IEQ/kg body weight.  She did not have diabetes before the procedure.  Early post-operative course was complicated by RLQ with appendicitis, eventually required appendectomy.    Despite the high islet mass transplanted, Chantell's post-operative course was initially remarkable for high insulin needs.  She in fact does have islet function, as previously documented by mixed meal tests, but she was insulin resistant, requiring high doses of insulin when on MDI therapy.  She also had frequent day to day variability, and fluctuating patterns with illness and healthier times, as well as a complex regimen, all of which make her an excellent  candidate for an insulin pump which she started in Feb 2014.  Extremely concerning was an episode of severe hypoglycemia in November 2013 at which time she was found unconscious.  Suspect at that time she was on too much basal insulin and because she was ill and not eating at the time, dropped far too low overnight.   In early 2014, she was started on an insulin pump with CGM.  Remarkably, her insulin needs have dropped dramatically on an insulin pump.  She was then relatively stable until 2016.  She was again admitted to the hospital on March 15 and March 29, 2016 for hypoglycemia, that appears to be secondary to both exogenous insulin and possible central adrenal insufficiency.   At that time she had the following lab findings:  On 3/29/16, elevated insulin with low C-peptide during BG 42 mg/dL around 5pm, and then again same pattern with BG 50s at 10pm.  Chantell is pretty clear that she did not take insuiln that day on 3/29/16. She also had three cortisol levels-- including one during hypoglycemia, one at around 4am (possibly also during hypoglycemia) and one 8am draw that were all low-- all 0.6- 1.6 range.    Of note, she did have a kenalog injection one week earlier on 3/24/16, just a few days prior to that draw and was also receiving narcotics in hospital (but not at home).  She has since had another severe hypogylcemic episode in Jan 2017 -- did not lose consciousness but was disoriented and unable to get help for herself at the store.  And again was admitted for severe hypoglycemia at [facility] on 11/29/2017 (refer to 12/13/17 note) with labs consistent with exogenous insulin overdose although she denied taking any excess insulin (12/1 C-peptide <0.1 and insulin .64.7, 11/30 C-peptide 0.5 and insulin 91). Additional hypoglycemia admissions: 8/26/18, 9/3/18, with high insulin and low C-peptide c/w hypoglycemia secondary to exogenous insulin (9/3/18 at 6:41pm about 20 hours after last reported insulin dose;  insulin 45.6 mU/L, C-peptide 0.2 ng/mL).    Picture is also complicated by non-diabetes history which includes the following :  - 7/6/2016 EGD identified diffuse candidiasis through entire esophagous.  Pt has also had recurrent oral thrush in 2016  - Recurrent chronic abdominal pain  - Recurrent vomiting of unclear etiology but G-T placed 10/2016 and converted to GJ 11/2016 with symptomatic improvement  Unclear if she has any gastroparesis.  Suboptimal study in March 2016 showed 100% retention at 1 hour and then rapid emptying.    - Hernia repair performed by general surgery 9/15/16 (TPIAT team not involved).    - Chronic abdominal pain      New history today:  1)  Diabetes:  Reason for follow up today are for recent hypoglycemia admissions in past month.  The first was 8/26-8/29/18 and second was 9/3- 9/7/18.  She reports today that this started at same time as high psychosocial stress.  She found out her 28 year old daughter has a chemical dependency with heroin, and this daughter admitted herself to a residential treatment program 3 weeks ago.  Chantell cannot speak with her or visit her for the first 30 days because of the rules of the program. She thinks her hypoglyecmia may have been precipitated by this stress.  I explained her lab results are c/w exogenous insulin and asked her again today if she may have taken extra insulin, particularly with these recent stressors, but she again reports that she did not take any extra insulin doses. I reviewed her pump.  There are no manual boluses entered at time of hypoglycemia. She did change reservoir just prior to onset of hypoglycemia 9/2, with an appropriate tubing and cannula fill amounts. She does not have any more glucagon at home.  She left the hospital on 9/7 off her pump.  She reports that she received a phone call to restart the pump-- is unclear to us who would have called as we have not found documentation of this call.  Leslie Cortez then instructed her to stop  her pump again last week.  Chantell reports she stopped and then self restarted for highs a few days ago, though her pump report seems to indicate she has been using bolus correction for high BG off the pump every day since Friday so it appears she has been on continuously since Friday.    Chantell is now on tube feeds of with Nutren 1.5 at 40 mL/ hour.  She was started on this during her recent hospital admit. She says this was for poor nutrition and not because of hypoglycemia. She is not sure who is managing it, or what the long-term plan is.  She is very uncomfortable with the NJ and has trouble swallowing food with it in, so she does not want to keep the NJ long term.     Pump settings with this are:  Basal rates 12a 0.4, 6:30am 0.5 unit/hour  Bolus: , target .  Has I:C ratio set at 40g but does not use this  Total daily dose (mostly basal) is about 12 unit/day.  Her BG numbers are 126- 363 mg/dL, only one is above 300.  There is one value of 69 mg/dL but this is the only hypoglycemia since leaving the hospital.  She has a dexcom that she is not using.  She says it is not connecting well to the . It is a D5.  I instructed her to call dexcom for assistance. Recommended she uses this given past hx of hypogylcemia.     2)  Adrenal insufficiency:  Still unclear if this was transient or true central AI but we have been treating her regardless due to SHE history.  SWe have maintained her on 20 mg/day (10 BID) of cortef, with Body surface area is 1.65 meters squared.  She is compliant with this.  Because of recent hypoglycemia we are not changing plan with this today, but is unclear if needed long term    3)  Other   - noted she has fosamax on her list with a Rx date of 2016. She says she does still take this once weekly.  She does get some pills 'stuck' in her esophagus but reports she notices this more with the KCl and not as much with the this pill  - has some significant psychosocial stress as noted  above.  Not currently working with psychology despite our past conversations regarding this.  Say she is still willing.  Distance is an issue for her so recommended she be seen at a private psychology clinic in [place] that would be closer.      Review of systems:  Review Of Systems  Skin: negative  Eyes: negative  Ears/Nose/Throat: negative  Respiratory: No shortness of breath, dyspnea on exertion, cough, or hemoptysis  Cardiovascular: negative  Gastrointestinal: chronic abdominal pain + hernia  Genitourinary: negative  Musculoskeletal: recent back pain  Neurologic: negative  Psychiatric: negative  Hematologic/Lymphatic/Immunologic: negative  Endocrine: as above    Past Medical and Surgical History:  Past Medical History:   Diagnosis Date     Chronic abdominal pain      Chronic pancreatitis (H)     S/P pancreatectomy     Depression with anxiety      Diabetes mellitus (H) 1/2012     Gastro-oesophageal reflux disease      Hypothyroidism 4/23/2015     Kidney stones      Low serum cortisol level (H)      Migraines      Other chronic pain     STOMACH     Other chronic pain     LUMBAR SPINE     Spasm of sphincter of Oddi      Past Surgical History:   Procedure Laterality Date     ARTHROPLASTY CARPOMETACARPAL (THUMB JOINT)  5/2/2014    Procedure: ARTHROPLASTY CARPOMETACARPAL (THUMB JOINT);  Surgeon: Carina Panda MD;  Location: [facility]     CHOLECYSTECTOMY  2004     COLONOSCOPY  7/18/2014    Procedure: COLONOSCOPY;  Surgeon: Aurora Sahu MD;  [facility]     COLONOSCOPY N/A 8/1/2017    Procedure: COLONOSCOPY;  Colonoscopy and upper endoscopy;  Surgeon: Deirdre Harris MD;  [facility]     ENDOSCOPIC RETROGRADE CHOLANGIOPANCREATOGRAM       ENDOSCOPIC RETROGRADE CHOLANGIOPANCREATOGRAM  4/19/2011    Procedure:ENDOSCOPIC RETROGRADE CHOLANGIOPANCREATOGRAM; Pancreatic Stent Placement       ENDOSCOPIC RETROGRADE CHOLANGIOPANCREATOGRAM  5/26/2011    Procedure:ENDOSCOPIC RETROGRADE  CHOLANGIOPANCREATOGRAM; with Pancreatic Stent Removal; Surgeon:DALE MIMS; Location:[facility]     ENDOSCOPY UPPER, COLONOSCOPY, COMBINED  4/25/2012    Procedure:COMBINED ENDOSCOPY UPPER, COLONOSCOPY; Enteroscopy with Bile Duct Stent Removal, Colonoscopy  *Latex Safe Room*; Surgeon:GRACY GODWIN; [facility]     ESOPHAGOSCOPY, GASTROSCOPY, DUODENOSCOPY (EGD), COMBINED  5/26/2011    Procedure:COMBINED ESOPHAGOSCOPY, GASTROSCOPY, DUODENOSCOPY (EGD); Surgeon:DALE MIMS; [facility]     ESOPHAGOSCOPY, GASTROSCOPY, DUODENOSCOPY (EGD), COMBINED N/A 10/30/2014    Procedure: COMBINED ESOPHAGOSCOPY, GASTROSCOPY, DUODENOSCOPY (EGD), BIOPSY SINGLE OR MULTIPLE;  Surgeon: Sarai Moon MD;  Location:  GI     ESOPHAGOSCOPY, GASTROSCOPY, DUODENOSCOPY (EGD), COMBINED Left 7/6/2015    Procedure: COMBINED ESOPHAGOSCOPY, GASTROSCOPY, DUODENOSCOPY (EGD), BIOPSY SINGLE OR MULTIPLE;  Surgeon: Thomas Estrada MD;  Location:  GI     ESOPHAGOSCOPY, GASTROSCOPY, DUODENOSCOPY (EGD), COMBINED N/A 7/8/2016    Procedure: COMBINED ESOPHAGOSCOPY, GASTROSCOPY, DUODENOSCOPY (EGD), BIOPSY SINGLE OR MULTIPLE;  Surgeon: Eloy Klein MD;  Location:  GI     ESOPHAGOSCOPY, GASTROSCOPY, DUODENOSCOPY (EGD), COMBINED N/A 8/4/2016    Procedure: COMBINED ESOPHAGOSCOPY, GASTROSCOPY, DUODENOSCOPY (EGD), BIOPSY SINGLE OR MULTIPLE;  Surgeon: Jason Brown MD;  Location:  GI     ESOPHAGOSCOPY, GASTROSCOPY, DUODENOSCOPY (EGD), COMBINED N/A 8/1/2017    Procedure: COMBINED ESOPHAGOSCOPY, GASTROSCOPY, DUODENOSCOPY (EGD);;  Surgeon: Deirdre Harris MD;  Location:  GI     GYN SURGERY      Hysterectomy and USO     [facility] ENDOSCOPY W EUS  7/20/2011    Procedure:COMBINED ENDOSCOPIC ULTRASOUND, ESOPHAGOSCOPY, GASTROSCOPY, DUODENOSCOPY (EGD); Surgeon:DARVIN DONOHUE; [facility]     HERNIORRHAPHY VENTRAL N/A 9/15/2016    Procedure: HERNIORRHAPHY VENTRAL;  Surgeon: Srinivasa  "Juanita Jasmine MD;  [facility]     HYSTERECTOMY  1997 or 1998    USO     INCISION AND DRAINAGE ABDOMEN WASHOUT, COMBINED  8/16/2012    Procedure: COMBINED INCISION AND DRAINAGE ABDOMEN WASHOUT;  ,debridement and Drainage Post Appendectomy;  Surgeon: Ron Austin MD;  [facility]     INJECT TRANSVERSUS ABDOMINIS PLANE (TAP) BLOCK BILATERAL Bilateral 5/26/2016    Procedure: INJECT TRANSVERSUS ABDOMINIS PLANE (TAP) BLOCK BILATERAL;  Surgeon: Leonard Mccallum MD;  [facility]     LAPAROSCOPIC APPENDECTOMY  7/30/2012    Procedure: LAPAROSCOPIC APPENDECTOMY;  Open Appendectomy;  Surgeon: Ron Austin MD;  [facility]     PANCREATECTOMY, TRANSPLANT AUTO ISLET CELL, COMBINED  1/6/2012    Procedure:COMBINED PANCREATECTOMY, TRANSPLANT AUTO ISLET CELL; Total  Pancreatectomy, Auto Islet Transplant, splenectomy, 18fr. transgastric-jejunal feeding tube placement, liver biopsy; Surgeon:PALAK LEE; [facility]     REPLACE GASTROSTOMY TUBE, PERCUTANEOUS N/A 8/30/2017    Procedure: REPLACE GASTROSTOMY TUBE, PERCUTANEOUS;  GJ Tube Change;  Surgeon: Jose Nath PA-C;  [facility]     SPLENECTOMY         Family History:  New changes since last visit:  none  Family History   Problem Relation Age of Onset     Hypertension Mother      Diabetes Mother      Osteoporosis Mother      Cancer Father      pancreatic cancer     Diabetes Maternal Grandmother      Cardiovascular Maternal Grandmother      Cancer Maternal Grandfather      lung cancer     Cancer Sister      brain     Cancer Sister      liver cancer       Social History:  Social History     Social History Narrative      Lives in [place] with her . Her daughter has recent history of new addiction that has created family stress.      Physical Exam:  Vitals: /70  Pulse 89  Temp 98.4  F (36.9  C) (Oral)  Resp 16  Ht 1.575 m (5' 2\")  Wt 62.6 kg (138 lb)  BMI 25.24 kg/m2  BMI= Body mass index is 25.24 " kg/(m^2).     General:  Well-appearing, NAD  Head: NC/AT  Eyes: sclera white  Neuro:  Normal mental status, normal gross motor  Psych:  Communicative, with normal affect     Results:    Lab Results   Component Value Date    A1C 6.6 08/27/2018    A1C 7.5 02/01/2018    A1C 6.7 12/13/2017    A1C 6.4 10/19/2017    A1C 5.9 01/19/2017       C-[id number]/1/2017  [facility]   Component Name Value Ref Range   C-PEPTIDE  <0.1 (L)   Comment:    Test Performed by:  [facility]  [address] [place] [address] 1.1 - 4.4 ng/mL    Specimen   Blood     Simultaneous glucose 12/1/17 = 81 mg/dL      Assessment:  1. 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  Treated for central adrenal insufficiency.  3.  Severe hypoglycemia secondary to exogenous insulin  4.  Malnutrition or poor oral intake on NJ feeds.      Chantell is a 52 year old with history of chronic pancreatitis who is s/p total pancreatectomy and islet autotransplant, with insulin dependence (pump therapy) complicated by insulin resistance, and several episodes of severe hypoglycemia and seizures (E10.641).  She is treated with a [employer] covered continuous subcutaneous insulin infusion pump.    Chantell has post-pancreatectomy diabetes-- essentially a surgical form of type 1 diabetes (E10.641).  She requires an insulin pump for management due to her multiple episodes of hypoglycemia and hypoglycemia unawareness, including episodes of seizures that required suspension of insulin pump and hospital admits. She also should have a continuous glucose monitor for frequent monitoring because of her severe hypoglycemia history.  Chantell continues to measure her BG 4-6 times every day, which we documented during the clinic encounter today from review of her logs and her meter download.    Chantell requires frequent insulin adjustments to her insulin regimen based on her blood glucoses to  control hyperglycemia and hypoglycemia.     At today's visit, I would like to reduce her insulin dosing as instructed below and add an oral medication. My goal is to minimize insulin or ideally get her off insulin if we can do safely, because she is at such high risk for having recurrent hypoglycemia episodes if she is on exogenous insulin. Recent stressors may have been contributing to these episodes and so I have again discussed with Chantell need to get in for regular psychology care.  Also discussed her case with dietitian with regards to need for nutritional goals and plan for tube feeds.  Either needs to wean off and get NJ out, or change to GJ if needs long term tube feeds.  Our nurse will d/w primary care clinic regarding who is in charge of long term management of this from MD side.  Specifically discussed with Chantell that she must tell me if there is a change in the feeds since it will affect her diabetes regimen.    Plan:  1.  Changes to current diabetes regimen:  Patient Instructions   1)  I am concerned about safety of insulin given the number of hypoglycemia episodes that you have had.  These seem to be related to exogenous insulin since the insulin levels are high and the C peptide levels are low (-- means that your body is not making that insulin, it is coming from insulin analogs).      2)  We are starting an oral medication to try to reduce or even wean off insulin.  This new oral medicine is called Invokana 100 mg tablet daily.  (If your insurance does not cover this one, there are two other brands of the same class of medication on the market).  This helps you excrete more glucose in your urine so that the blood glucose is not high.     -- Watch for urinary symptoms-- since you have more sugar in your urine, it would be normal to urinate a little more frequently, but seek medical attention if you have symptoms of a urinary tract infection.  -- the other risk is that if you had ketones, you may not have  a very high blood sugar with the ketones, so we need to watch more closely for this.  For a while, use the urine sticks to check ketones once daily and let us know right away if it is more than trace. (should be negative or trace).  Then symptoms of ketones would be persistent vomiting, worsening abdominal pain, altered mental status.  So if your abdominal symptoms are worse than your usual baseline, you should check ketones.    3)  We are changing pump settings to lower insulin now than you have been using past few days, and potentially stopping insulin depending on how it goes with the medication.  Current doses:  12am 0.25 u/hour;  6:30am 0.30 units/hour.  You can use your correction dose which is 1 unit per 100 mg/dL above 140 mg/dL but would not use carb coverage for now.    4)  Keep hydrocortisone at 10 mg twice daily.    5)  Watch on the fosamax for pill feeling stuck or not going down as it can irritate the esophagus.  I have had TPIAT patients in the past get IV infusions instead of the oral medication because of concerns about GI motility and risk for esophagus irritation.  This medication is prescribed currently through your primary care clinic.     6)  Email me every couple days to check in with numbers for a while.    7)  Would be a good time to get into a psychologist or counselor to deal with your extra stress recently.  There is one in [place]:  [facility] is right off [address].  Call [phone] to start process.     Follow up 10:40am on Dec 20 (thurs)    Need dietitian visit-- I will give rod a call now      2.  Frequency of blood sugar checks:  Premeal, bedtime, and additional measurements PRN-- Should have strips sufficient to test 8 times per day given her labiltiy history.    3.  Continue routine follow up for autoislet transplant patients:  Mixed meal test (6 mL/kg BoostHP to max of 360 mL) at 3 months, 6 months, and once a year post transplant.   Hemoglobin A1c levels at these time points and quarterly.    4.  Other issues addressed today:  As above        Follow up: as above    Contact me for questions at [phone] or [phone].  Emergency number to reach pediatric endocrinology after hours is [phone].        Amena Maher MD  , Pediatric Endocrinology and Diabetes  [facility] [place]  [facility]      VISIT TIME:  40 minutes of face to face time, >50% spent in counseling and coordination of care    Amena Maher MD

## 2018-09-19 NOTE — MR AVS SNAPSHOT
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  These seem to be related to exogenous insulin since the insulin levels are high and the C peptide levels are low (-- means that your body is not making that insulin, it is coming from insulin analogs).      2)  We are starting an oral medication to try to reduce or even wean off insulin.  This new oral medicine is called Invokana 100 mg tablet daily.  (If your insurance does not cover this one, there are two other brands of the same class of medication on the market).  This helps you excrete more glucose in your urine so that the blood glucose is not high.     -- Watch for urinary symptoms-- since you have more sugar in your urine, it would be normal to urinate a little more frequently, but seek medical attention if you have symptoms of a urinary tract infection.  -- the other risk is that if you had ketones, you may not have a very high blood sugar with the ketones, so we need to watch more closely for this.  For a while, use the urine sticks to check ketones once daily and let us know right away if it is more than trace. (should be negative or trace).  Then symptoms of ketones would be persistent vomiting, worsening abdominal pain, altered mental status.  So if your abdominal symptoms are worse than your usual baseline, you should check ketones.    3)  We are changing pump settings to lower insulin now than you have been using past few days, and potentially stopping insulin depending  on how it goes with the medication.  Current doses:  12am 0.25 u/hour;  6:30am 0.30 units/hour.  You can use your correction dose which is 1 unit per 100 mg/dL above 140 mg/dL but would not use carb coverage for now.    4)  Keep hydrocortisone at 10 mg twice daily.    5)  Watch on the fosamax for pill feeling stuck or not going down as it can irritate the esophagus.  I have had TPIAT patients in the past get IV infusions instead of the oral medication because of concerns about GI motility and risk for esophagus irritation.  This medication is prescribed currently through your primary care clinic.     6)  Email me every couple days to check in with numbers for a while.    7)  Would be a good time to get into a psychologist or counselor to deal with your extra stress recently.  There is one in [place]:  [facility] is right off [address].  Call [phone] to start process.     Follow up 10:40am on Dec 20 (thurs)    Need dietitian visit-- I will give rod a call now          Follow-ups after your visit        Your next 10 appointments already scheduled     Sep 25, 2018  8:00 AM CDT   MR LUMBAR SPINE W/O CONTRAST with MGMR1   [facility]    [id number] [place] [address] [phone]           Take your medicines as usual, unless your doctor tells you not to. Bring a list of your current medicines to your exam (including vitamins, minerals and over-the-counter drugs). Also bring the results of similar scans you may have had.  Please remove any body piercings and hair extensions before you arrive.  Follow your doctor s orders. If you do not, we may have to postpone your exam.  You may or may not receive IV contrast for this exam pending the discretion of the Radiologist.  You do not need to do anything special to prepare.  The MRI machine uses a strong magnet. Please wear clothes without metal (snaps, zippers). [facility] works well, or we may give you a hospital gown.   **IMPORTANT** THE INSTRUCTIONS BELOW ARE ONLY FOR THOSE PATIENTS WHO HAVE BEEN PRESCRIBED SEDATION OR GENERAL ANESTHESIA DURING THEIR MRI PROCEDURE:  IF YOUR DOCTOR PRESCRIBED ORAL SEDATION (take medicine to help you relax during your exam):   You must get the medicine from your doctor (oral medication) before you arrive. Bring the medicine to the exam. Do not take it at home. You ll be told when to take it upon arriving for your exam.   Arrive one hour early. Bring someone who can take you home after the test. Your medicine will make you sleepy. After the exam, you may not drive, take a bus or take a taxi by yourself.  IF YOUR DOCTOR PRESCRIBED IV SEDATION:   Arrive one hour early. Bring someone who can take you home after the test. Your medicine will make you sleepy. After the exam, you may not drive, take a bus or take a taxi by yourself.   No eating 6 hours before your exam. You may have clear liquids up until 4 hours before your exam. (Clear liquids include water, clear tea, black coffee and fruit juice without pulp.)  IF YOUR DOCTOR PRESCRIBED ANESTHESIA (be asleep for your exam):   Arrive 1 1/2 hours early. Bring someone who can take you home after the test. You may not drive, take a bus or take a taxi by yourself.   No eating 8 hours before your exam. You may have clear liquids up until 4 hours before your exam. (Clear liquids include water, clear tea, black coffee and fruit juice without pulp.)   You will spend four to five hours in the recovery room.  Please call the Imaging Department at your exam site with any questions.            Sep 27, 2018 10:00 AM CDT   (Arrive by 9:45 AM)   New Patient Visit with Juancho Vazquez MD   [facility] ([facility])    [address] [address]   [phone]            Oct 04, 2018 10:20 AM CDT   (Arrive by 10:05 AM)   Return Visit with  "Ron Morgan MD   [facility] ([facility])    [address] [facility]  4th Floor  [facility] [address] [phone]              Who to contact     If you have questions or need follow up information about today's clinic visit or your schedule please contact [facility] directly at [phone].  Normal or non-critical lab and imaging results will be communicated to you by MyChart, letter or phone within 4 business days after the clinic has received the results. If you do not hear from us within 7 days, please contact the clinic through [facility] [employer] or phone. If you have a critical or abnormal lab result, we will notify you by phone as soon as possible.  Submit refill requests through [employer] or call your pharmacy and they will forward the refill request to us. Please allow 3 business days for your refill to be completed.          Additional Information About Your Visit        [facility] Information     [employer] gives you secure access to your electronic health record. If you see a primary care provider, you can also send messages to your care team and make appointments. If you have questions, please call your primary care clinic.  If you do not have a primary care provider, please call [phone] and they will assist you.        Care EveryWhere ID     This is your Care EveryWhere ID. This could be used by other organizations to access your [place] medical records  [id number]        Your Vitals Were     Pulse Temperature Respirations Height BMI (Body Mass Index)       89 98.4  F (36.9  C) (Oral) 16 1.575 m (5' 2\") 25.24 kg/m2        Blood Pressure from Last 3 Encounters:   09/19/18 105/70   09/17/18 114/72   09/07/18 110/73    Weight from Last 3 Encounters:   09/19/18 62.6 kg (138 lb)   09/17/18 66.9 kg (147 lb 8 oz)   09/06/18 64 kg (141 lb 3.2 oz)              Today, you had the following     No orders found for display         Today's " Medication Changes          These changes are accurate as of 9/19/18  9:38 AM.  If you have any questions, ask your nurse or doctor.               Start taking these medicines.        Dose/Directions    canagliflozin 100 MG tablet   Commonly known as:  INVOKANA   Used for:  Post-pancreatectomy diabetes (H)   Started by:  Amena Maher MD        Dose:  100 mg   Take 1 tablet (100 mg) by mouth every morning (before breakfast)   Quantity:  30 tablet   Refills:  11         These medicines have changed or have updated prescriptions.        Dose/Directions    * glucagon 1 MG kit   Commonly known as:  GLUCAGON EMERGENCY   This may have changed:  Another medication with the same name was added. Make sure you understand how and when to take each.   Used for:  Hypoglycemia unawareness in type 1 diabetes mellitus (H), Type 1 diabetes mellitus with hypoglycemic coma (H)   Changed by:  Amena Maher MD        Dose:  1 mg   Inject 1 mg into the muscle once for 1 dose   Quantity:  1 mg   Refills:  1       * glucagon 1 MG kit   Commonly known as:  GLUCAGON EMERGENCY   This may have changed:  You were already taking a medication with the same name, and this prescription was added. Make sure you understand how and when to take each.   Used for:  Hypoglycemia unawareness in type 1 diabetes mellitus (H), Post-pancreatectomy diabetes (H)   Changed by:  Amena Maher MD        Dose:  1 mg   Inject 1 mg into the muscle once for 1 dose   Quantity:  1 mg   Refills:  0       * Notice:  This list has 2 medication(s) that are the same as other medications prescribed for you. Read the directions carefully, and ask your doctor or other care provider to review them with you.         Where to get your medicines      These medications were sent to [address] [address]    Hours:  TRANSPLANT PHONE NUMBER [phone] Phone:   [phone]     canagliflozin 100 MG tablet    glucagon 1 MG kit                Primary Care Provider Office Phone # Fax #    Ron Kvng Morgan [phone]       [address] [place] [address]        Equal Access to Services     BRIDGETTE JOHNSON : Hadii aad ku hadasho Soomaali, waaxda luqadaha, qaybta kaalmada adeegyada, waxay joanain haymadelynn aderajinder hawkins meghan flowers. So [facility] [place] [phone].    ATENCIÓN: Si habla español, tiene a webb disposición servicios gratuitos de asistencia lingüística. Llame al [phone].    We comply with applicable federal civil rights laws and [place] laws. We do not discriminate on the basis of race, color, national origin, age, disability, sex, sexual orientation, or gender identity.            Thank you!     Thank you for choosing [facility]  for your care. Our goal is always to provide you with excellent care. Hearing back from our patients is one way we can continue to improve our services. Please take a few minutes to complete the written survey that you may receive in the mail after your visit with us. Thank you!             Your Updated Medication List - Protect others around you: Learn how to safely use, store and throw away your medicines at www.disposemymeds.org.          This list is accurate as of 9/19/18  9:38 AM.  Always use your most recent med list.                   Brand Name Dispense Instructions for use Diagnosis    acetaminophen 500 MG Caps      Take 1,000 mg by mouth three times a day as needed.        alendronate 70 MG tablet    FOSAMAX    4 tablet    Take 1 tablet (70 mg) by mouth every 7 days On Sundays take first thing in the morning with plain water and remain upright for at least 30 minutes and until after first food of the day  Do not restart Fosamax (alendronate) until your difficulty swallowing has resolved and you have finished the entire course of fluconazole (Diflucan).    Osteoporosis       alum & mag  hydroxide-simethicone 200-200-25 MG Chew chewable tablet    MYLANTA/MAALOX     Take 1 tablet by mouth 3 times daily as needed for indigestion        amylase-lipase-protease 03698 units Cpep    CREON 12    750 capsule    Take 6 to 8 capsules by mouth with meals and take 4 capsules with snacks. Needs up to 25 capsules per day    Exocrine pancreatic insufficiency       aspirin 81 MG tablet      Take 81 mg by mouth daily.        blood glucose monitoring lancets     102 each    by Lancet route. Use to test blood sugar daily or as directed.    Chronic abdominal pain       * blood glucose monitoring test strip    no brand specified    240 each    Use to test blood glucoses 6-8 times per day.    Post-pancreatectomy diabetes (H)       * blood glucose monitoring test strip    NOEMI CONTOUR NEXT    240 each    Use to test blood sugar 8 times daily.    Post-pancreatectomy diabetes (H)       BOOST HIGH PROTEIN Liqd      Also can use Ensure clear (available over the counter)    Pancreatic insufficiency       canagliflozin 100 MG tablet    INVOKANA    30 tablet    Take 1 tablet (100 mg) by mouth every morning (before breakfast)    Post-pancreatectomy diabetes (H)       clotrimazole 10 MG Lozg lozenge    MYCELEX    70 each    Place 1 lozenge (1 Brenda) inside cheek 4 times daily    Thrush       cyclobenzaprine 5 MG tablet    FLEXERIL     Take 10 mg by mouth 2 times daily as needed for muscle spasms        diclofenac 1 % Gel topical gel    VOLTAREN     2 g Apply 2 g to skin four times a day as needed (to affected upper extremity joint(s)). Maximum 8g/day per joint, 16g/day total.        diclofenac 1.3 % Patch    FLECTOR    30 each    Place 1 patch onto the skin 2 times daily    Generalized abdominal pain       dicyclomine 10 MG capsule    BENTYL    40 capsule    TAKE ONE CAPSULE BY MOUTH EVERY 6 HOURS AS NEEDED    Abdominal pain, epigastric       dronabinol 2.5 MG capsule    MARINOL    56 capsule    Take 2 capsules (5 mg) by mouth 2  times daily as needed    Routine health maintenance       * DULoxetine 30 MG EC capsule    CYMBALTA    90 capsule    Take 1 capsule (30 mg) by mouth daily With 60mg capsule for total dose of 90mg    Major depressive disorder, recurrent episode, moderate (H), CIERRA (generalized anxiety disorder)       * DULoxetine 60 MG EC capsule    CYMBALTA    90 capsule    Take 1 capsule (60 mg) by mouth daily With 30mg capsule for total dose of 90mg    Major depressive disorder, recurrent episode, moderate (H), CIERRA (generalized anxiety disorder)       fluconazole 200 MG tablet    DIFLUCAN    15 tablet    Take 2 tabs the first day and 1 tab for the next 13 days        furosemide 20 MG tablet    LASIX    180 tablet    Take 1 tablet (20 mg) by mouth 2 times daily    Edema, unspecified type       * glucagon 1 MG kit    GLUCAGON EMERGENCY    1 mg    Inject 1 mg into the muscle once for 1 dose    Hypoglycemia unawareness in type 1 diabetes mellitus (H), Type 1 diabetes mellitus with hypoglycemic coma (H)       * glucagon 1 MG kit    GLUCAGON EMERGENCY    1 mg    Inject 1 mg into the muscle once for 1 dose    Hypoglycemia unawareness in type 1 diabetes mellitus (H), Post-pancreatectomy diabetes (H)       hydrocortisone 10 MG tablet    CORTEF    60 tablet    Take 10 mg in the morning and 10 mg at bedtime. Watch for hypoglycemia recurrence.    Hypoglycemia, Adrenal insufficiency (H)       insulin pen needle 31G X 5 MM     2 Box    RX# [id number]  Pen Needle UC 31G UF IV Mini  Use 4-8 needles per day for insulin injections.    Chronic abdominal pain       levothyroxine 112 MCG tablet    SYNTHROID/LEVOTHROID    30 tablet    Take 1 tablet (112 mcg) by mouth daily    Hypothyroidism       linaclotide 145 MCG capsule    LINZESS    30 capsule    Take 1 capsule (145 mcg) by mouth every morning (before breakfast)    Constipation, unspecified constipation type, Chronic back pain, unspecified back location, unspecified back pain laterality, Physical  deconditioning, Primary insomnia       metoclopramide 5 MG tablet    REGLAN    180 tablet    Take 2 tablets (10 mg) by mouth 3 times daily    Nausea       nystatin 937207 unit/mL Susp suspension    MYCOSTATIN    60 mL    Take 1 mL (100,000 Units) by mouth 4 times daily    Odynophagia       ondansetron 4 MG ODT tab    ZOFRAN-ODT    60 tablet    DISSOLVE ONE TABLET ON THE TONGUE EVERY 6 HOURS AS NEEDED FOR NAUSEA AND VOMITING    Nausea       order for [facility]     1 Month    by [address] [place], [place] Ph: [phone] Fax: [phone]  Nutren 1.5 @ 10 ml/hr with advancement by 10 ml/hr q 8 hours to goal rate of 40 ml/hr.  This will provide 1440 kcals (27 kcal/kg/day), 65 g PRO (1.2 g/kg/day), 730 mL H2O, 169 g CHO and no Fiber daily.    Hypoglycemia, Nausea, Decreased oral intake, Exocrine pancreatic insufficiency, Type 1 diabetes mellitus with hypoglycemia and without coma (H), Generalized abdominal pain, Post-pancreatectomy diabetes (H), Cell transplant, On enteral nutrition       pantoprazole 40 MG EC tablet    PROTONIX    180 tablet    Take 1 tablet (40 mg) by mouth 2 times daily    H. pylori infection       polyethylene glycol Packet    MIRALAX/GLYCOLAX    14 each    Take 1 packet by mouth 2 times daily as needed for constipation    Chronic constipation       potassium chloride SA 20 MEQ CR tablet    K-DUR/KLOR-CON M    90 tablet    Take 1 tablet (20 mEq) by mouth daily    Hypokalemia       pregabalin 150 MG capsule    LYRICA    90 capsule    Take 1 capsule (150 mg) by mouth 3 times daily    Chronic generalized abdominal pain       senna-docusate 8.6-50 MG per tablet    SENOKOT-S;PERICOLACE    60 tablet    Take 1-2 tablets by mouth daily as needed for constipation    Constipation       sodium chloride 0.65 % nasal spray    OCEAN    30 mL    Spray 1 spray into both nostrils daily as needed for congestion    Irritation of nose       SUMAtriptan 50 MG tablet    IMITREX    30 tablet     Take 1 tablet (50 mg) by mouth at onset of headache for migraine Take 1 Tab by mouth Once as needed for Migraine Headache. May repeat after two hours.  Maximum dose 200 mg/24 hours.    Migraine       topiramate 100 MG tablet    TOPAMAX    180 tablet    Take 1 tablet (100 mg) by mouth 2 times daily    Migraine, unspecified, not intractable, without status migrainosus       traZODone 100 MG tablet    DESYREL    100 tablet    TAKE 1-2 TABLETS BY MOUTH AN HOUR BEFORE BEDTIME    Primary insomnia, Constipation, unspecified constipation type, Chronic back pain, unspecified back location, unspecified back pain laterality, Physical deconditioning       Urine Glucose-Ketones Test Strp     30 each    1 strip by In Vitro route 3 times daily Test urine 3 times a day.    Elevated blood sugar       * Notice:  This list has 6 medication(s) that are the same as other medications prescribed for you. Read the directions carefully, and ask your doctor or other care provider to review them with you.

## 2018-09-20 NOTE — TELEPHONE ENCOUNTER
FUTURE VISIT INFORMATION      FUTURE VISIT INFORMATION:    Date: 9/27/18    Time:     Location: [facility]  REFERRAL INFORMATION:    Referring provider:  Delilah Orosco    Referring providers clinic:  [facility]    Reason for visit/diagnosis  Chronic right-sided low back pain, with sciatica presence unspecified - ortho con referral    RECORDS REQUESTED FROM:       Clinic name Comments Records Status Imaging Status    Delilah Gutierrez referring internal internal                                   RECORDS STATUS

## 2018-09-25 ENCOUNTER — RADIANT APPOINTMENT (OUTPATIENT)
Dept: MRI IMAGING | Facility: CLINIC | Age: 53
End: 2018-09-25
Attending: NURSE PRACTITIONER
Payer: MEDICARE

## 2018-09-25 DIAGNOSIS — G89.29 CHRONIC RIGHT-SIDED LOW BACK PAIN, WITH SCIATICA PRESENCE UNSPECIFIED: ICD-10-CM

## 2018-09-25 DIAGNOSIS — M62.838 MUSCLE SPASM: Primary | ICD-10-CM

## 2018-09-25 DIAGNOSIS — M54.5 CHRONIC RIGHT-SIDED LOW BACK PAIN, WITH SCIATICA PRESENCE UNSPECIFIED: ICD-10-CM

## 2018-09-25 PROCEDURE — 72148 MRI LUMBAR SPINE W/O DYE: CPT | Performed by: RADIOLOGY

## 2018-09-25 NOTE — TELEPHONE ENCOUNTER
cyclobenzaprine (FLEXERIL) 5 MG tablet      Last Written Prescription Date:  Historical     Last Office Visit : 9/17/18  Future Office visit:  10/4/18    Routing refill request to provider for review/approval because:  1. Drug not on the refill protocol.  2. Medication listed as historical.   2. Clarify instructions.  On Medication List As: Take 2 tablets (10 mg) by mouth two times daily as needed for muscle spasms.   Refill Requested As: Take 1 tablet (5 mg) by mouth three times a day as needed for muscle spasm.     *Med Pended As:  Requested   Last Date Dispensed at pharmacy: 04/08/18 Qty: 42      -----------------------------  Last prescribed dose 5mg TID PRN. Will route to PCP for review. Does not appear medication was discussed with Deillah last week.    Jahaira Mi RN  9/25/2018 3:40 PM

## 2018-09-26 RX ORDER — CYCLOBENZAPRINE HCL 5 MG
5 TABLET ORAL 3 TIMES DAILY PRN
Qty: 42 TABLET | Refills: 0 | Status: SHIPPED | OUTPATIENT
Start: 2018-09-26 | End: 2019-03-15

## 2018-09-27 ENCOUNTER — OFFICE VISIT (OUTPATIENT)
Dept: ORTHOPEDICS | Facility: CLINIC | Age: 53
End: 2018-09-27
Payer: MEDICARE

## 2018-09-27 ENCOUNTER — OFFICE VISIT (OUTPATIENT)
Dept: TRANSPLANT | Facility: CLINIC | Age: 53
End: 2018-09-27
Attending: NURSE PRACTITIONER
Payer: MEDICARE

## 2018-09-27 ENCOUNTER — TELEPHONE (OUTPATIENT)
Dept: GASTROENTEROLOGY | Facility: CLINIC | Age: 53
End: 2018-09-27

## 2018-09-27 ENCOUNTER — PRE VISIT (OUTPATIENT)
Dept: ORTHOPEDICS | Facility: CLINIC | Age: 53
End: 2018-09-27

## 2018-09-27 ENCOUNTER — DOCUMENTATION ONLY (OUTPATIENT)
Dept: TRANSPLANT | Facility: CLINIC | Age: 53
End: 2018-09-27

## 2018-09-27 VITALS
HEIGHT: 62 IN | WEIGHT: 136 LBS | DIASTOLIC BLOOD PRESSURE: 73 MMHG | TEMPERATURE: 98.3 F | SYSTOLIC BLOOD PRESSURE: 112 MMHG | BODY MASS INDEX: 25.03 KG/M2 | OXYGEN SATURATION: 97 % | HEART RATE: 79 BPM

## 2018-09-27 VITALS — HEIGHT: 62 IN | BODY MASS INDEX: 25.4 KG/M2 | WEIGHT: 138 LBS

## 2018-09-27 DIAGNOSIS — E13.9 POST-PANCREATECTOMY DIABETES (H): Primary | ICD-10-CM

## 2018-09-27 DIAGNOSIS — M53.3 SACROILIAC JOINT DYSFUNCTION OF LEFT SIDE: Primary | ICD-10-CM

## 2018-09-27 DIAGNOSIS — Z90.410 POST-PANCREATECTOMY DIABETES (H): Primary | ICD-10-CM

## 2018-09-27 DIAGNOSIS — G89.29 CHRONIC LOW BACK PAIN, UNSPECIFIED BACK PAIN LATERALITY, WITH SCIATICA PRESENCE UNSPECIFIED: ICD-10-CM

## 2018-09-27 DIAGNOSIS — E89.1 POST-PANCREATECTOMY DIABETES (H): Primary | ICD-10-CM

## 2018-09-27 DIAGNOSIS — M54.5 CHRONIC LOW BACK PAIN, UNSPECIFIED BACK PAIN LATERALITY, WITH SCIATICA PRESENCE UNSPECIFIED: ICD-10-CM

## 2018-09-27 ASSESSMENT — PAIN SCALES - GENERAL: PAINLEVEL: NO PAIN (0)

## 2018-09-27 NOTE — MR AVS SNAPSHOT
After Visit Summary   9/27/2018    Chantell Kidd    MRN: [id number]           Patient Information     Date Of Birth          1965        Visit Information        Provider Department      9/27/2018 10:00 AM Juancho Vazquez MD [facility]        Today's Diagnoses     Sacroiliac joint dysfunction of left side    -  1    Chronic low back pain, unspecified back pain laterality, with sciatica presence unspecified           Follow-ups after your visit        Additional Services     [facility]       Your provider has referred you to: [facility], located on the 4th Floor of the [facility]. Please call [phone] to make an appointment.     Clinic is located at:   [place] [address]      Please complete the following questions:    Procedure/Referral: Referral Only -  Comprehensive Evaluation and Management    What is your diagnosis for the patient's pain? Chronic low back pain    What are your specific questions for the pain specialist? Assess chronic low back pain    Are there any red flags that may impact the assessment or management of the patient? None    REGARDING OPIOID MEDICATIONS:  The discussion of opioids management, appropriateness of therapy, and dosing will be discussed in patients being seen for evaluation.  The pain management clinics are not long-term prescribing clinics, with transition of prescribing of medications ultimately going back to the referring provider/PCP.  If prescribing is taken over at the pain clinic, it is in actively involved patients whom are appropriate for opioids, urine drug screening is completed, and long-term prescribing plan has been determined.  Therefore, we will not be automatically taking over prescribing at the patient's first visit.  Is this agreeable to you? agrees.    Please be aware that coverage  of these services is subject to the terms and limitations of your health insurance plan.  Call member services at your health plan with any benefit or coverage questions.      Please bring the following with you to your appointment or have sent to the [facility]:    (1) Any X-Rays, CTs or MRIs which have been performed that are not in Epic.  Contact the facility where they were done to arrange for  prior to your scheduled appointment.  Any new CT, MRI or other procedures ordered by your specialist must be performed at a [facility] facility or coordinated by your clinic's referral office.    (2) List of current medications   (3) This referral request   (4) Any documents/labs given to you for this referral                  Your next 10 appointments already scheduled     Sep 27, 2018 11:00 AM CDT   (Arrive by 10:45 AM)   Post-Op with Carline Moy NP   [facility] Solid Organ Transplant ([facility])    [address]  [address]  [facility] [id number] [phone]            Oct 04, 2018 10:00 AM CDT   (Arrive by 9:30 AM)   CT SACROILIAC JOINT INJECTION DIAGNOSTIC with URIRCT, UR NEURO RAD, URCT2   [facility], Radiology ([facility] [place])    [facility] [id number] [phone]           How do I prepare for my exam? (Food and drink instructions) The day before your exam: Drink extra fluids  at least six 8-ounce glasses (unless your doctor tells you to restrict fluids).  The day of your exam: No eating or drinking for 4 hours before your test. You may take medicine with small sips of water  What should I wear: Please wear loose clothing, such as a sweat suit or jogging clothes. Avoid snaps, zippers and other metal. We may ask you to undress and put on a hospital gown.  How long does the exam take: Plan to spend up to 6 hours at the hospital. The test itself normally takes less than an hour. We will watch for  side effects for 1 to 4 hours.  What should I bring: Please bring any scans or X-rays taken at other hospitals, if they may be helpful. Also bring a list of your medicines, including vitamins, minerals and over-the-counter drugs. It is safest to leave personal items at home.  Do I need a : Plan for an adult to drive you home and stay with you until morning.  What do I need to tell my doctor? Tell your doctor in advance: * If you have any allergies. * If you are breastfeeding or there s any chance you are pregnant. * If you are taking Coumadin (or any other blood thinners) 5 days prior to the exam for any special instructions. * If you are diabetic to determine if your insulin needs have to be adjusted for the exam.  What should I do after the exam: When you get home, you ll need to take it easy for the rest of the day.  Do not drive for 24 hours. You may have slight bruising and mild pain in the area. If so, you may take acetaminophen (Tylenol) or ibuprofen (Motrin, Advil) after you get home.  Some people go home with a small tube (catheter) sewn into the skin. If this case, we will show you how to care for the tube.  What is this test: This test uses a very thin needle to remove tissue, fluid or other cells from your body. We then send the cells to a lab for testing. A biopsy tests for disease in a tissue sample. Aspiration tests for disease or infection in a fluid sample. We use pictures from a CT (computed tomography) scan to guide the needle to the right place. A CT scan is a special X-ray. The scanner creates images of the body in cross sections, much like slices of bread. You may receive contrast (X-ray dye) before or during your scan. Contrast is given through an IV (small needle in your arm) or taken by mouth.  Who should I call with questions: If you have any questions, please call the imaging department where you will have your exam. Directions, parking instructions, and other information is  available on our website, [email or web address].            Oct 04, 2018 10:20 AM CDT   (Arrive by 10:05 AM)   Return Visit with Ron Morgan MD   [facility] ([facility])    [address] [place] [id number] [phone]            Nov 30, 2018  2:50 PM CST   (Arrive by 2:35 PM)   New Patient Visit with BEE Kilgore CNP   [facility] ([facility])    [address] [place] [id number] [phone]              Future tests that were ordered for you today     Open Future Orders        Priority Expected Expires Ordered    CT Sacroiliac Therapeutic Joint Injection Routine  9/27/2019 9/27/2018            Who to contact     Please call your clinic at [phone] to:    Ask questions about your health    Make or cancel appointments    Discuss your medicines    Learn about your test results    Speak to your doctor            Additional Information About Your Visit        [employer] Information     [facility] gives you secure access to your electronic health record. If you see a primary care provider, you can also send messages to your care team and make appointments. If you have questions, please call your primary care clinic.  If you do not have a primary care provider, please call [phone] and they will assist you.      [employer] is an electronic gateway that provides easy, online access to your medical records. With [employer], you can request a clinic appointment, read your test results, renew a prescription or communicate with your care team.     To access your existing account, please contact your [facility] or call [phone] for assistance.        Care EveryWhere ID     This is your Care EveryWhere ID. This could be used by other organizations to access your [facility] medical records  [id number]        Your  "Vitals Were     Height BMI (Body Mass Index)                5' 2\" (1.575 m) 25.24 kg/m2           Blood Pressure from Last 3 Encounters:   09/19/18 105/70   09/17/18 114/72   09/07/18 110/73    Weight from Last 3 Encounters:   09/27/18 138 lb (62.6 kg)   09/19/18 138 lb (62.6 kg)   09/17/18 147 lb 8 oz (66.9 kg)              We Performed the Following     [facility]        Primary Care Provider Office Phone # Fax #    Ron Morgan [phone]       [address] [place] [address]        Equal Access to Services     BRIDGETTE JOHNSON : Arslan Shoemaker, waurmilada hernandez, qaybta kaalmada nissa, bambi flowers. So [facility] [phone].    ATENCIÓN: Si habla español, tiene a webb disposición servicios gratuitos de asistencia lingüística. [facility] [phone].    We comply with applicable federal civil rights laws and [place] laws. We do not discriminate on the basis of race, color, national origin, age, disability, sex, sexual orientation, or gender identity.            Thank you!     Thank you for choosing [facility]  for your care. Our goal is always to provide you with excellent care. Hearing back from our patients is one way we can continue to improve our services. Please take a few minutes to complete the written survey that you may receive in the mail after your visit with us. Thank you!             Your Updated Medication List - Protect others around you: Learn how to safely use, store and throw away your medicines at www.disposemymeds.org.          This list is accurate as of 9/27/18 10:18 AM.  Always use your most recent med list.                   Brand Name Dispense Instructions for use Diagnosis    acetaminophen 500 MG Caps      Take 1,000 mg by mouth three times a day as needed.        alendronate 70 MG tablet    FOSAMAX    4 tablet    Take 1 tablet (70 mg) by mouth every 7 days " On Sundays take first thing in the morning with plain water and remain upright for at least 30 minutes and until after first food of the day  Do not restart Fosamax (alendronate) until your difficulty swallowing has resolved and you have finished the entire course of fluconazole (Diflucan).    Osteoporosis       alum & mag hydroxide-simethicone 200-200-25 MG Chew chewable tablet    MYLANTA/MAALOX     Take 1 tablet by mouth 3 times daily as needed for indigestion        amylase-lipase-protease 58312 units Cpep    CREON 12    750 capsule    Take 6 to 8 capsules by mouth with meals and take 4 capsules with snacks. Needs up to 25 capsules per day    Exocrine pancreatic insufficiency       aspirin 81 MG tablet      Take 81 mg by mouth daily.        blood glucose monitoring lancets     102 each    by Lancet route. Use to test blood sugar daily or as directed.    Chronic abdominal pain       * blood glucose monitoring test strip    no brand specified    240 each    Use to test blood glucoses 6-8 times per day.    Post-pancreatectomy diabetes (H)       * blood glucose monitoring test strip    NOEMI CONTOUR NEXT    240 each    Use to test blood sugar 8 times daily.    Post-pancreatectomy diabetes (H)       BOOST HIGH PROTEIN Liqd      Also can use Ensure clear (available over the counter)    Pancreatic insufficiency       canagliflozin 100 MG tablet    INVOKANA    30 tablet    Take 1 tablet (100 mg) by mouth every morning (before breakfast)    Post-pancreatectomy diabetes (H)       clotrimazole 10 MG Lozg lozenge    MYCELEX    70 each    Place 1 lozenge (1 Brenda) inside cheek 4 times daily    Thrush       cyclobenzaprine 5 MG tablet    FLEXERIL    42 tablet    Take 1 tablet (5 mg) by mouth 3 times daily as needed for muscle spasms    Muscle spasm       diclofenac 1 % Gel topical gel    VOLTAREN     2 g Apply 2 g to skin four times a day as needed (to affected upper extremity joint(s)). Maximum 8g/day per joint, 16g/day  total.        diclofenac 1.3 % Patch    FLECTOR    30 each    Place 1 patch onto the skin 2 times daily    Generalized abdominal pain       dicyclomine 10 MG capsule    BENTYL    40 capsule    TAKE ONE CAPSULE BY MOUTH EVERY 6 HOURS AS NEEDED    Abdominal pain, epigastric       dronabinol 2.5 MG capsule    MARINOL    56 capsule    Take 2 capsules (5 mg) by mouth 2 times daily as needed    Routine health maintenance       * DULoxetine 30 MG EC capsule    CYMBALTA    90 capsule    Take 1 capsule (30 mg) by mouth daily With 60mg capsule for total dose of 90mg    Major depressive disorder, recurrent episode, moderate (H), CIERRA (generalized anxiety disorder)       * DULoxetine 60 MG EC capsule    CYMBALTA    90 capsule    Take 1 capsule (60 mg) by mouth daily With 30mg capsule for total dose of 90mg    Major depressive disorder, recurrent episode, moderate (H), CIERRA (generalized anxiety disorder)       fluconazole 200 MG tablet    DIFLUCAN    15 tablet    Take 2 tabs the first day and 1 tab for the next 13 days        furosemide 20 MG tablet    LASIX    180 tablet    Take 1 tablet (20 mg) by mouth 2 times daily    Edema, unspecified type       glucagon 1 MG kit    GLUCAGON EMERGENCY    1 mg    Inject 1 mg into the muscle once for 1 dose    Hypoglycemia unawareness in type 1 diabetes mellitus (H), Post-pancreatectomy diabetes (H)       hydrocortisone 10 MG tablet    CORTEF    60 tablet    Take 10 mg in the morning and 10 mg at bedtime. Watch for hypoglycemia recurrence.    Hypoglycemia, Adrenal insufficiency (H)       insulin pen needle 31G X 5 MM     2 Box    RX# [id number]  Pen Needle UC 31G UF IV Mini  Use 4-8 needles per day for insulin injections.    Chronic abdominal pain       levothyroxine 112 MCG tablet    SYNTHROID/LEVOTHROID    30 tablet    Take 1 tablet (112 mcg) by mouth daily    Hypothyroidism       linaclotide 145 MCG capsule    LINZESS    30 capsule    Take 1 capsule (145 mcg) by mouth every morning (before  breakfast)    Constipation, unspecified constipation type, Chronic back pain, unspecified back location, unspecified back pain laterality, Physical deconditioning, Primary insomnia       metoclopramide 5 MG tablet    REGLAN    180 tablet    Take 2 tablets (10 mg) by mouth 3 times daily    Nausea       nystatin 267252 unit/mL Susp suspension    MYCOSTATIN    60 mL    Take 1 mL (100,000 Units) by mouth 4 times daily    Odynophagia       ondansetron 4 MG ODT tab    ZOFRAN-ODT    60 tablet    DISSOLVE ONE TABLET ON THE TONGUE EVERY 6 HOURS AS NEEDED FOR NAUSEA AND VOMITING    Nausea       order for DME     1 Month    by [employer] route [place], [place] Ph: [phone] Fax: [phone]  Nutren 1.5 @ 10 ml/hr with advancement by 10 ml/hr q 8 hours to goal rate of 40 ml/hr.  This will provide 1440 kcals (27 kcal/kg/day), 65 g PRO (1.2 g/kg/day), 730 mL H2O, 169 g CHO and no Fiber daily.    Hypoglycemia, Nausea, Decreased oral intake, Exocrine pancreatic insufficiency, Type 1 diabetes mellitus with hypoglycemia and without coma (H), Generalized abdominal pain, Post-pancreatectomy diabetes (H), Cell transplant, On enteral nutrition       pantoprazole 40 MG EC tablet    PROTONIX    180 tablet    Take 1 tablet (40 mg) by mouth 2 times daily    H. pylori infection       polyethylene glycol Packet    MIRALAX/GLYCOLAX    14 each    Take 1 packet by mouth 2 times daily as needed for constipation    Chronic constipation       potassium chloride SA 20 MEQ CR tablet    K-DUR/KLOR-CON M    90 tablet    Take 1 tablet (20 mEq) by mouth daily    Hypokalemia       pregabalin 150 MG capsule    LYRICA    90 capsule    Take 1 capsule (150 mg) by mouth 3 times daily    Chronic generalized abdominal pain       senna-docusate 8.6-50 MG per tablet    SENOKOT-S;PERICOLACE    60 tablet    Take 1-2 tablets by mouth daily as needed for constipation    Constipation       sodium chloride 0.65 % nasal spray    OCEAN    30  mL    Spray 1 spray into both nostrils daily as needed for congestion    Irritation of nose       SUMAtriptan 50 MG tablet    IMITREX    30 tablet    Take 1 tablet (50 mg) by mouth at onset of headache for migraine Take 1 Tab by mouth Once as needed for Migraine Headache. May repeat after two hours.  Maximum dose 200 mg/24 hours.    Migraine       topiramate 100 MG tablet    TOPAMAX    180 tablet    Take 1 tablet (100 mg) by mouth 2 times daily    Migraine, unspecified, not intractable, without status migrainosus       traZODone 100 MG tablet    DESYREL    100 tablet    TAKE 1-2 TABLETS BY MOUTH AN HOUR BEFORE BEDTIME    Primary insomnia, Constipation, unspecified constipation type, Chronic back pain, unspecified back location, unspecified back pain laterality, Physical deconditioning       Urine Glucose-Ketones Test Strp     30 each    1 strip by In Vitro route 3 times daily Test urine 3 times a day.    Elevated blood sugar       * Notice:  This list has 4 medication(s) that are the same as other medications prescribed for you. Read the directions carefully, and ask your doctor or other care provider to review them with you.

## 2018-09-27 NOTE — PROGRESS NOTES
Dawns case was discussed last night at the chronic pancreatitis meeting( Joselyn Castellon. Ayaz ) . Plan to to remove NJ tube in clinic today ( Carline Moy NP ). Evaluate patient over next week to make sure she is able to eat /hydrate. She will see Dr Jacome 10/10/18 for potential G/J placement if she fails . Per message from Dr Maher, once NJ is out and tube feeds stopped reduce all basal settings on Insulin pump to 0.1.

## 2018-09-27 NOTE — PROGRESS NOTES
Subjective:   Chantell Kidd is a 52 year old female who is here for low back pain for many years worse in the last 3-6 months. Medications don't help. PT is not helping. Chantell states she has had longstanding low back discomfort.  Over the course of the past several months she noticed that it has been a little bit more intense, really focused on the right side and in the region of the right SI joint.  Occasionally, it radiates across her low back in a belt-like fashion.  She denies any radicular symptoms.  She denies any cauda equina symptoms.  She does have chronic pancreatitis and has had a prior islet cell transplant and is currently on a feeding tube.       Background:   Date of injury: none     PAST MEDICAL, SOCIAL, SURGICAL AND FAMILY HISTORY: She  has a past medical history of Chronic abdominal pain; Chronic pancreatitis (H); Depression with anxiety; Diabetes mellitus (H) (1/2012); Gastro-oesophageal reflux disease; Hypothyroidism (4/23/2015); Kidney stones; Low serum cortisol level (H); Migraines; Other chronic pain; Other chronic pain; and Spasm of sphincter of Oddi. She also has no past medical history of Blood transfusion; Chronic infection; Hepatitis; or Seizures (H).  She  has a past surgical history that includes Hysterectomy (1997 or 1998); Cholecystectomy (2004); Endoscopic retrograde cholangiopancreatogram; Endoscopic retrograde cholangiopancreatogram (4/19/2011); GYN surgery; Endoscopic retrograde cholangiopancreatogram (5/26/2011); Esophagoscopy, gastroscopy, duodenoscopy (EGD), combined (5/26/2011); UGI ENDOSCOPY W EUS (7/20/2011); Pancreatectomy, transplant auto islet cell, combined (1/6/2012); Endoscopy upper, colonoscopy, combined (4/25/2012); Laparoscopic appendectomy (7/30/2012); Incision and drainage abdomen washout, combined (8/16/2012); Arthroplasty carpometacarpal (thumb joint) (5/2/2014); Colonoscopy (7/18/2014); Esophagoscopy, gastroscopy, duodenoscopy (EGD), combined (N/A, 10/30/2014);  "Esophagoscopy, gastroscopy, duodenoscopy (EGD), combined (Left, 7/6/2015); splenectomy; Inject Transversus Abdominis Plane (Tap) Block Bilateral (Bilateral, 5/26/2016); Esophagoscopy, gastroscopy, duodenoscopy (EGD), combined (N/A, 7/8/2016); Esophagoscopy, gastroscopy, duodenoscopy (EGD), combined (N/A, 8/4/2016); Herniorrhaphy ventral (N/A, 9/15/2016); Colonoscopy (N/A, 8/1/2017); Esophagoscopy, gastroscopy, duodenoscopy (EGD), combined (N/A, 8/1/2017); and Replace Gastrostomy Tube, Percutaneous (N/A, 8/30/2017).  Her family history includes Cancer in her father, maternal grandfather, sister, and sister; Cardiovascular in her maternal grandmother; Diabetes in her maternal grandmother and mother; Hypertension in her mother; Osteoporosis in her mother.  She reports that she has never smoked. She has never used smokeless tobacco. She reports that she does not drink alcohol or use illicit drugs.    ALLERGIES: She is allergic to corticosteroids and chocolate flavor.    CURRENT MEDICATIONS: She has a current medication list which includes the following prescription(s): blood glucose monitoring, acetaminophen, alendronate, alum & mag hydroxide-simethicone, amylase-lipase-protease, aspirin, blood glucose monitoring, blood glucose monitoring, canagliflozin, clotrimazole, cyclobenzaprine, diclofenac, diclofenac, dicyclomine, dronabinol, duloxetine, duloxetine, fluconazole, furosemide, glucagon, hydrocortisone, insulin pen needle, levothyroxine, linaclotide, metoclopramide, boost high protein, nystatin, ondansetron, order for dme, pantoprazole, polyethylene glycol, potassium chloride sa, pregabalin, senna-docusate, sodium chloride, sumatriptan, topiramate, trazodone, and urine glucose-ketones test.     REVIEW OF SYSTEMS: 10 point review of systems is negative except as noted above.     Exam:   Ht 5' 2\" (1.575 m)  Wt 138 lb (62.6 kg)  BMI 25.24 kg/m2      CONSTITUTIONAL: alert and no distress  HEAD: Normocephalic. No " masses, lesions, tenderness or abnormalities  SKIN: no suspicious lesions or rashes  GAIT: broad based  NEUROLOGIC: Non-focal, Normal muscle tone and strength, reflexes normal, sensation grossly normal.  PSYCHIATRIC: mentation appears normal.    MUSCULOSKELETAL:   LS SPINE:  She is nontender over the lumbosacral spine.  The right SI joint is tender to palpation.  She has positive compression test.  Any extension or rotation to the right produces localized right SI joint discomfort.  There is no overlying warmth or erythema.  She has negative dural tension signs.  L4 through S1 are tested and intact bilaterally.      ASSESSMENT:  Chantell is a 52-year-old female with what appears to be right SI joint dysfunction with pain, but also on review of her recent lumbosacral MRI she has bilateral L5 spondylolisthesis or pars interarticularis defects, likely secondary to degenerative changes.  There is no significant disk bulging or nerve root entrapment.  There is no significant spinal canal stenosis.  The SI joint does not appear to be involved radiographically.  She also has an abdominal CT from 2018 which is also reviewed and demonstrates degenerative changes of the lumbosacral spine as well as her bilateral pars intraarticular defects for L5.      I have ordered a right CT-guided SI joint injection and she is going to get this.  If this alleviates the bulk of her pain, then she is going to postpone her evaluation in the Chronic Pain Clinic for an additional month.  If this relieves only a small portion of her pain, then she will keep her appointment approximately 2 weeks later in the Chronic Pain Clinic and they can evaluate her for posterior element or facet arthropathy and possible benefit from medial nerve branch block injections.  At any rate, that is likely the best source of care for her and chronic low back pain is.  She currently has nothing surgical or otherwise interventional that we would care for at [facility]  Medicine.      Thank you for allowing me to participate in her care.

## 2018-09-27 NOTE — MR AVS SNAPSHOT
After Visit Summary   9/27/2018    Chantell Kidd    MRN: [id number]           Patient Information     Date Of Birth          1965        Visit Information        Provider Department      9/27/2018 11:00 AM Carline Moy NP  [facility]        Today's Diagnoses     Post-pancreatectomy diabetes (H)    -  1       Follow-ups after your visit        Your next 10 appointments already scheduled     Oct 04, 2018 10:00 AM CDT   (Arrive by 9:30 AM)   CT SACROILIAC JOINT INJECTION DIAGNOSTIC with URIRCT, UR NEURO RAD, URCT2   [facility] [place], Radiology ([facility])    [address] [place] [address] [phone]           How do I prepare for my exam? (Food and drink instructions) The day before your exam: Drink extra fluids  at least six 8-ounce glasses (unless your doctor tells you to restrict fluids).  The day of your exam: No eating or drinking for 4 hours before your test. You may take medicine with small sips of water  What should I wear: Please wear loose clothing, such as a sweat suit or jogging clothes. Avoid snaps, zippers and other metal. We may ask you to undress and put on a hospital gown.  How long does the exam take: Plan to spend up to 6 hours at the hospital. The test itself normally takes less than an hour. We will watch for side effects for 1 to 4 hours.  What should I bring: Please bring any scans or X-rays taken at other hospitals, if they may be helpful. Also bring a list of your medicines, including vitamins, minerals and over-the-counter drugs. It is safest to leave personal items at home.  Do I need a : Plan for an adult to drive you home and stay with you until morning.  What do I need to tell my doctor? Tell your doctor in advance: * If you have any allergies. * If you are breastfeeding or there s any chance you are pregnant. * If you are taking Coumadin (or any other blood thinners) 5 days  prior to the exam for any special instructions. * If you are diabetic to determine if your insulin needs have to be adjusted for the exam.  What should I do after the exam: When you get home, you ll need to take it easy for the rest of the day.  Do not drive for 24 hours. You may have slight bruising and mild pain in the area. If so, you may take acetaminophen (Tylenol) or ibuprofen (Motrin, Advil) after you get home.  Some people go home with a small tube (catheter) sewn into the skin. If this case, we will show you how to care for the tube.  What is this test: This test uses a very thin needle to remove tissue, fluid or other cells from your body. We then send the cells to a lab for testing. A biopsy tests for disease in a tissue sample. Aspiration tests for disease or infection in a fluid sample. We use pictures from a CT (computed tomography) scan to guide the needle to the right place. A CT scan is a special X-ray. The scanner creates images of the body in cross sections, much like slices of bread. You may receive contrast (X-ray dye) before or during your scan. Contrast is given through an IV (small needle in your arm) or taken by mouth.  Who should I call with questions: If you have any questions, please call the imaging department where you will have your exam. Directions, parking instructions, and other information is available on our website, [email or web address].            Oct 04, 2018 10:20 AM CDT   (Arrive by 10:05 AM)   Return Visit with Ron Morgan MD   [facility] ([facility])    [address] [place] [address]   [phone]            Oct 10, 2018  1:30 PM CDT   (Arrive by 1:15 PM)   New Patient Visit with John Jacome MD   [facility] ([facility])    [address] [place] [address]   [phone]            Nov 30, 2018  2:50 PM CST  "  (Arrive by 2:35 PM)   New Patient Visit with BEE Kilgore CNP   [facility] ([facility])    [address]  [address]th [address]  [facility] [address] [phone]            Dec 20, 2018 11:20 AM CST   (Arrive by 11:05 AM)   Return Auto Islet with Amena Maher MD   [facility] ([facility])    [address]  [address]  [facility] [address] [phone]              Who to contact     If you have questions or need follow up information about today's clinic visit or your schedule please contact [facility] directly at [phone].  Normal or non-critical lab and imaging results will be communicated to you by MyChart, letter or phone within 4 business days after the clinic has received the results. If you do not hear from us within 7 days, please contact the clinic through [employer] or phone. If you have a critical or abnormal lab result, we will notify you by phone as soon as possible.  Submit refill requests through [employer] or call your pharmacy and they will forward the refill request to us. Please allow 3 business days for your refill to be completed.          Additional Information About Your Visit        [employer] Information     [employer] gives you secure access to your electronic health record. If you see a primary care provider, you can also send messages to your care team and make appointments. If you have questions, please call your primary care clinic.  If you do not have a primary care provider, please call [phone] and they will assist you.        Care EveryWhere ID     This is your Care EveryWhere ID. This could be used by other organizations to access your [facility] medical records  [id number]        Your Vitals Were     Pulse Temperature Height Pulse Oximetry BMI (Body Mass Index)       79 98.3  F (36.8  C) 1.575 m (5' 2\") 97% 24.87 kg/m2        " Blood Pressure from Last 3 Encounters:   09/27/18 112/73   09/19/18 105/70   09/17/18 114/72    Weight from Last 3 Encounters:   09/27/18 61.7 kg (136 lb)   09/27/18 62.6 kg (138 lb)   09/19/18 62.6 kg (138 lb)              Today, you had the following     No orders found for display       Primary Care Provider Office Phone # Fax #    Ron Morgan [phone]       [address] [place] [address]        Equal Access to Services     [facility]: Hadii aad ku hadasho Soomaali, waaxda luqadaha, qaybta kaalmada adewan, bambi christianson . So [facility] [phone].    ATENCIÓN: Si habla español, tiene a webb disposición servicios gratuitos de asistencia lingüística. [facility] [phone].    We comply with applicable federal civil rights laws and [place] laws. We do not discriminate on the basis of race, color, national origin, age, disability, sex, sexual orientation, or gender identity.            Thank you!     Thank you for choosing [facility]  for your care. Our goal is always to provide you with excellent care. Hearing back from our patients is one way we can continue to improve our services. Please take a few minutes to complete the written survey that you may receive in the mail after your visit with us. Thank you!             Your Updated Medication List - Protect others around you: Learn how to safely use, store and throw away your medicines at www.disposemymeds.org.          This list is accurate as of 9/27/18 11:59 PM.  Always use your most recent med list.                   Brand Name Dispense Instructions for use Diagnosis    acetaminophen 500 MG Caps      Take 1,000 mg by mouth three times a day as needed.        alendronate 70 MG tablet    FOSAMAX    4 tablet    Take 1 tablet (70 mg) by mouth every 7 days On Sundays take first thing in the morning with plain water and remain upright for at least 30 minutes and until  after first food of the day  Do not restart Fosamax (alendronate) until your difficulty swallowing has resolved and you have finished the entire course of fluconazole (Diflucan).    Osteoporosis       alum & mag hydroxide-simethicone 200-200-25 MG Chew chewable tablet    MYLANTA/MAALOX     Take 1 tablet by mouth 3 times daily as needed for indigestion        amylase-lipase-protease 64822 units Cpep    CREON 12    750 capsule    Take 6 to 8 capsules by mouth with meals and take 4 capsules with snacks. Needs up to 25 capsules per day    Exocrine pancreatic insufficiency       aspirin 81 MG tablet      Take 81 mg by mouth daily.        blood glucose monitoring lancets     102 each    by Lancet route. Use to test blood sugar daily or as directed.    Chronic abdominal pain       * blood glucose monitoring test strip    no brand specified    240 each    Use to test blood glucoses 6-8 times per day.    Post-pancreatectomy diabetes (H)       * blood glucose monitoring test strip    NOEMI CONTOUR NEXT    240 each    Use to test blood sugar 8 times daily.    Post-pancreatectomy diabetes (H)       BOOST HIGH PROTEIN Liqd      Also can use Ensure clear (available over the counter)    Pancreatic insufficiency       canagliflozin 100 MG tablet    INVOKANA    30 tablet    Take 1 tablet (100 mg) by mouth every morning (before breakfast)    Post-pancreatectomy diabetes (H)       clotrimazole 10 MG Lozg lozenge    MYCELEX    70 each    Place 1 lozenge (1 Brenda) inside cheek 4 times daily    Thrush       cyclobenzaprine 5 MG tablet    FLEXERIL    42 tablet    Take 1 tablet (5 mg) by mouth 3 times daily as needed for muscle spasms    Muscle spasm       diclofenac 1 % Gel topical gel    VOLTAREN     2 g Apply 2 g to skin four times a day as needed (to affected upper extremity joint(s)). Maximum 8g/day per joint, 16g/day total.        diclofenac 1.3 % Patch    FLECTOR    30 each    Place 1 patch onto the skin 2 times daily     Generalized abdominal pain       dicyclomine 10 MG capsule    BENTYL    40 capsule    TAKE ONE CAPSULE BY MOUTH EVERY 6 HOURS AS NEEDED    Abdominal pain, epigastric       dronabinol 2.5 MG capsule    MARINOL    56 capsule    Take 2 capsules (5 mg) by mouth 2 times daily as needed    Routine health maintenance       * DULoxetine 30 MG EC capsule    CYMBALTA    90 capsule    Take 1 capsule (30 mg) by mouth daily With 60mg capsule for total dose of 90mg    Major depressive disorder, recurrent episode, moderate (H), CIERRA (generalized anxiety disorder)       * DULoxetine 60 MG EC capsule    CYMBALTA    90 capsule    Take 1 capsule (60 mg) by mouth daily With 30mg capsule for total dose of 90mg    Major depressive disorder, recurrent episode, moderate (H), CIERRA (generalized anxiety disorder)       fluconazole 200 MG tablet    DIFLUCAN    15 tablet    Take 2 tabs the first day and 1 tab for the next 13 days        furosemide 20 MG tablet    LASIX    180 tablet    Take 1 tablet (20 mg) by mouth 2 times daily    Edema, unspecified type       glucagon 1 MG kit    GLUCAGON EMERGENCY    1 mg    Inject 1 mg into the muscle once for 1 dose    Hypoglycemia unawareness in type 1 diabetes mellitus (H), Post-pancreatectomy diabetes (H)       hydrocortisone 10 MG tablet    CORTEF    60 tablet    Take 10 mg in the morning and 10 mg at bedtime. Watch for hypoglycemia recurrence.    Hypoglycemia, Adrenal insufficiency (H)       insulin pen needle 31G X 5 MM     2 Box    RX# [id number]  Pen Needle UC 31G UF IV Mini  Use 4-8 needles per day for insulin injections.    Chronic abdominal pain       levothyroxine 112 MCG tablet    SYNTHROID/LEVOTHROID    30 tablet    Take 1 tablet (112 mcg) by mouth daily    Hypothyroidism       linaclotide 145 MCG capsule    LINZESS    30 capsule    Take 1 capsule (145 mcg) by mouth every morning (before breakfast)    Constipation, unspecified constipation type, Chronic back pain, unspecified back location,  unspecified back pain laterality, Physical deconditioning, Primary insomnia       metoclopramide 5 MG tablet    REGLAN    180 tablet    Take 2 tablets (10 mg) by mouth 3 times daily    Nausea       nystatin 993673 unit/mL Susp suspension    MYCOSTATIN    60 mL    Take 1 mL (100,000 Units) by mouth 4 times daily    Odynophagia       ondansetron 4 MG ODT tab    ZOFRAN-ODT    60 tablet    DISSOLVE ONE TABLET ON THE TONGUE EVERY 6 HOURS AS NEEDED FOR NAUSEA AND VOMITING    Nausea       order for DME     1 Month    by [employer] [address] [place], [place] Ph: [phone] Fax: [phone]  Nutren 1.5 @ 10 ml/hr with advancement by 10 ml/hr q 8 hours to goal rate of 40 ml/hr.  This will provide 1440 kcals (27 kcal/kg/day), 65 g PRO (1.2 g/kg/day), 730 mL H2O, 169 g CHO and no Fiber daily.    Hypoglycemia, Nausea, Decreased oral intake, Exocrine pancreatic insufficiency, Type 1 diabetes mellitus with hypoglycemia and without coma (H), Generalized abdominal pain, Post-pancreatectomy diabetes (H), Cell transplant, On enteral nutrition       pantoprazole 40 MG EC tablet    PROTONIX    180 tablet    Take 1 tablet (40 mg) by mouth 2 times daily    H. pylori infection       polyethylene glycol Packet    MIRALAX/GLYCOLAX    14 each    Take 1 packet by mouth 2 times daily as needed for constipation    Chronic constipation       potassium chloride SA 20 MEQ CR tablet    K-DUR/KLOR-CON M    90 tablet    Take 1 tablet (20 mEq) by mouth daily    Hypokalemia       pregabalin 150 MG capsule    LYRICA    90 capsule    Take 1 capsule (150 mg) by mouth 3 times daily    Chronic generalized abdominal pain       senna-docusate 8.6-50 MG per tablet    SENOKOT-S;PERICOLACE    60 tablet    Take 1-2 tablets by mouth daily as needed for constipation    Constipation       sodium chloride 0.65 % nasal spray    OCEAN    30 mL    Spray 1 spray into both nostrils daily as needed for congestion    Irritation of nose        SUMAtriptan 50 MG tablet    IMITREX    30 tablet    Take 1 tablet (50 mg) by mouth at onset of headache for migraine Take 1 Tab by mouth Once as needed for Migraine Headache. May repeat after two hours.  Maximum dose 200 mg/24 hours.    Migraine       topiramate 100 MG tablet    TOPAMAX    180 tablet    Take 1 tablet (100 mg) by mouth 2 times daily    Migraine, unspecified, not intractable, without status migrainosus       traZODone 100 MG tablet    DESYREL    100 tablet    TAKE 1-2 TABLETS BY MOUTH AN HOUR BEFORE BEDTIME    Primary insomnia, Constipation, unspecified constipation type, Chronic back pain, unspecified back location, unspecified back pain laterality, Physical deconditioning       Urine Glucose-Ketones Test Strp     30 each    1 strip by In Vitro route 3 times daily Test urine 3 times a day.    Elevated blood sugar       * Notice:  This list has 4 medication(s) that are the same as other medications prescribed for you. Read the directions carefully, and ask your doctor or other care provider to review them with you.

## 2018-09-27 NOTE — NURSING NOTE
"Chief Complaint   Patient presents with     Surgical Followup     Remove NJ tube      Blood pressure 112/73, pulse 79, temperature 98.3  F (36.8  C), height 1.575 m (5' 2\"), weight 61.7 kg (136 lb), SpO2 97 %, not currently breastfeeding.    Hugh Kilpatrick CMA    "

## 2018-09-27 NOTE — TELEPHONE ENCOUNTER
VM left for Chantell informing her that I am sending scheduling information to her [employer].   Direct line left for pt if she does not have access to [employer].     SR 09/27/2018 @ 1225 P

## 2018-09-27 NOTE — LETTER
9/27/2018      RE: Chantell Kidd  [address]  [facility] [address]        Subjective:   Chantell Kidd is a 52 year old female who is here for low back pain for many years worse in the last 3-6 months. Medications don't help. PT is not helping. Chantell states she has had longstanding low back discomfort.  Over the course of the past several months she noticed that it has been a little bit more intense, really focused on the right side and in the region of the right SI joint.  Occasionally, it radiates across her low back in a belt-like fashion.  She denies any radicular symptoms.  She denies any cauda equina symptoms.  She does have chronic pancreatitis and has had a prior islet cell transplant and is currently on a feeding tube.       Background:   Date of injury: none     PAST MEDICAL, SOCIAL, SURGICAL AND FAMILY HISTORY: She  has a past medical history of Chronic abdominal pain; Chronic pancreatitis (H); Depression with anxiety; Diabetes mellitus (H) (1/2012); Gastro-oesophageal reflux disease; Hypothyroidism (4/23/2015); Kidney stones; Low serum cortisol level (H); Migraines; Other chronic pain; Other chronic pain; and Spasm of sphincter of Oddi. She also has no past medical history of Blood transfusion; Chronic infection; Hepatitis; or Seizures (H).  She  has a past surgical history that includes Hysterectomy (1997 or 1998); Cholecystectomy (2004); Endoscopic retrograde cholangiopancreatogram; Endoscopic retrograde cholangiopancreatogram (4/19/2011); GYN surgery; Endoscopic retrograde cholangiopancreatogram (5/26/2011); Esophagoscopy, gastroscopy, duodenoscopy (EGD), combined (5/26/2011); UGI ENDOSCOPY W EUS (7/20/2011); Pancreatectomy, transplant auto islet cell, combined (1/6/2012); Endoscopy upper, colonoscopy, combined (4/25/2012); Laparoscopic appendectomy (7/30/2012); Incision and drainage abdomen washout, combined (8/16/2012); Arthroplasty carpometacarpal (thumb joint) (5/2/2014); Colonoscopy  "(7/18/2014); Esophagoscopy, gastroscopy, duodenoscopy (EGD), combined (N/A, 10/30/2014); Esophagoscopy, gastroscopy, duodenoscopy (EGD), combined (Left, 7/6/2015); splenectomy; Inject Transversus Abdominis Plane (Tap) Block Bilateral (Bilateral, 5/26/2016); Esophagoscopy, gastroscopy, duodenoscopy (EGD), combined (N/A, 7/8/2016); Esophagoscopy, gastroscopy, duodenoscopy (EGD), combined (N/A, 8/4/2016); Herniorrhaphy ventral (N/A, 9/15/2016); Colonoscopy (N/A, 8/1/2017); Esophagoscopy, gastroscopy, duodenoscopy (EGD), combined (N/A, 8/1/2017); and Replace Gastrostomy Tube, Percutaneous (N/A, 8/30/2017).  Her family history includes Cancer in her father, maternal grandfather, sister, and sister; Cardiovascular in her maternal grandmother; Diabetes in her maternal grandmother and mother; Hypertension in her mother; Osteoporosis in her mother.  She reports that she has never smoked. She has never used smokeless tobacco. She reports that she does not drink alcohol or use illicit drugs.    ALLERGIES: She is allergic to corticosteroids and chocolate flavor.    CURRENT MEDICATIONS: She has a current medication list which includes the following prescription(s): blood glucose monitoring, acetaminophen, alendronate, alum & mag hydroxide-simethicone, amylase-lipase-protease, aspirin, blood glucose monitoring, blood glucose monitoring, canagliflozin, clotrimazole, cyclobenzaprine, diclofenac, diclofenac, dicyclomine, dronabinol, duloxetine, duloxetine, fluconazole, furosemide, glucagon, hydrocortisone, insulin pen needle, levothyroxine, linaclotide, metoclopramide, boost high protein, nystatin, ondansetron, order for dme, pantoprazole, polyethylene glycol, potassium chloride sa, pregabalin, senna-docusate, sodium chloride, sumatriptan, topiramate, trazodone, and urine glucose-ketones test.     REVIEW OF SYSTEMS: 10 point review of systems is negative except as noted above.     Exam:   Ht 5' 2\" (1.575 m)  Wt 138 lb (62.6 kg)  " BMI 25.24 kg/m2      CONSTITUTIONAL: alert and no distress  HEAD: Normocephalic. No masses, lesions, tenderness or abnormalities  SKIN: no suspicious lesions or rashes  GAIT: broad based  NEUROLOGIC: Non-focal, Normal muscle tone and strength, reflexes normal, sensation grossly normal.  PSYCHIATRIC: mentation appears normal.    MUSCULOSKELETAL:   LS SPINE:  She is nontender over the lumbosacral spine.  The right SI joint is tender to palpation.  She has positive compression test.  Any extension or rotation to the right produces localized right SI joint discomfort.  There is no overlying warmth or erythema.  She has negative dural tension signs.  L4 through S1 are tested and intact bilaterally.      ASSESSMENT:  Chantell is a 52-year-old female with what appears to be right SI joint dysfunction with pain, but also on review of her recent lumbosacral MRI she has bilateral L5 spondylolisthesis or pars interarticularis defects, likely secondary to degenerative changes.  There is no significant disk bulging or nerve root entrapment.  There is no significant spinal canal stenosis.  The SI joint does not appear to be involved radiographically.  She also has an abdominal CT from 2018 which is also reviewed and demonstrates degenerative changes of the lumbosacral spine as well as her bilateral pars intraarticular defects for L5.      I have ordered a right CT-guided SI joint injection and she is going to get this.  If this alleviates the bulk of her pain, then she is going to postpone her evaluation in the [facility] for an additional month.  If this relieves only a small portion of her pain, then she will keep her appointment approximately 2 weeks later in the Chronic Pain Clinic and they can evaluate her for posterior element or facet arthropathy and possible benefit from medial nerve branch block injections.  At any rate, that is likely the best source of care for her and chronic low back pain is.  She currently has  nothing surgical or otherwise interventional that we would care for at Sports Medicine.      Thank you for allowing me to participate in her care.           Juancho Vazquez MD

## 2018-09-27 NOTE — LETTER
9/27/2018       RE: Chantell Kidd  [address]  [facility] [address]     Dear Colleague,    Thank you for referring your patient, Chantell Kidd, to the [facility] SOLID ORGAN TRANSPLANT at [facility]. Please see a copy of my visit note below.    Chronic Pancreatitis Group Progress Note    I had the pleasure of seeing Ms. Chantell Kidd today. She is 2460 days status post total pancreatectomy with islet autotransplant.    Interval events since last visit with me:  NG tube for malnutrition.  Presents today for removal.      The patient reports their current symptoms to be:   GI function:   Nausea:   []  none    [x]  rare    []  often    []  daily  Comment:   Vomiting: [x]  none    []  rare    []  often    []  daily   Comment:   Last BM: Today.  Stools are soft, frequency : .   Appetite: poor  Tube feeds:  Remove NG  Oral food intake: 1-3 per day    Lab Results   Component Value Date    A1C 6.6 08/27/2018    A1C 7.5 02/01/2018    A1C 6.7 12/13/2017      Prescription Medications as of 10/1/2018             ACCU-CHEK MULTICLIX LANCETS MISC by Lancet route. Use to test blood sugar daily or as directed.    acetaminophen 500 MG CAPS Take 1,000 mg by mouth three times a day as needed.    alendronate (FOSAMAX) 70 MG tablet Take 1 tablet (70 mg) by mouth every 7 days On Sundays take first thing in the morning with plain water and remain upright for at least 30 minutes and until after first food of the day    Do not restart Fosamax (alendronate) until your difficulty swallowing has resolved and you have finished the entire course of fluconazole (Diflucan).    alum & mag hydroxide-simethicone (MYLANTA/MAALOX) 200-200-25 MG CHEW chewable tablet Take 1 tablet by mouth 3 times daily as needed for indigestion    amylase-lipase-protease (CREON 12) 92642 units CPEP Take 6 to 8 capsules by mouth with meals and take 4 capsules with snacks. Needs up to 25 capsules per day    aspirin 81 MG tablet Take  81 mg by mouth daily.    blood glucose monitoring (NOEMI CONTOUR NEXT) test strip Use to test blood sugar 8 times daily.    blood glucose monitoring (NO BRAND SPECIFIED) test strip Use to test blood glucoses 6-8 times per day.    canagliflozin (INVOKANA) 100 MG tablet Take 1 tablet (100 mg) by mouth every morning (before breakfast)    clotrimazole (MYCELEX) 10 MG LOZG lozenge Place 1 lozenge (1 Brenda) inside cheek 4 times daily    cyclobenzaprine (FLEXERIL) 5 MG tablet Take 1 tablet (5 mg) by mouth 3 times daily as needed for muscle spasms    diclofenac (FLECTOR) 1.3 % Patch Place 1 patch onto the skin 2 times daily    diclofenac (VOLTAREN) 1 % GEL 2 g Apply 2 g to skin four times a day as needed (to affected upper extremity joint(s)). Maximum 8g/day per joint, 16g/day total.    dicyclomine (BENTYL) 10 MG capsule TAKE ONE CAPSULE BY MOUTH EVERY 6 HOURS AS NEEDED    dronabinol (MARINOL) 2.5 MG capsule Take 2 capsules (5 mg) by mouth 2 times daily as needed    DULoxetine (CYMBALTA) 30 MG EC capsule Take 1 capsule (30 mg) by mouth daily With 60mg capsule for total dose of 90mg    DULoxetine (CYMBALTA) 60 MG EC capsule Take 1 capsule (60 mg) by mouth daily With 30mg capsule for total dose of 90mg    fluconazole (DIFLUCAN) 200 MG tablet Take 2 tabs the first day and 1 tab for the next 13 days    furosemide (LASIX) 20 MG tablet Take 1 tablet (20 mg) by mouth 2 times daily    glucagon (GLUCAGON EMERGENCY) 1 MG kit Inject 1 mg into the muscle once for 1 dose    hydrocortisone (CORTEF) 10 MG tablet Take 10 mg in the morning and 10 mg at bedtime. Watch for hypoglycemia recurrence.    insulin pen needle needle RX# [id number]   Pen Needle UC 31G UF IV Mini   Use 4-8 needles per day for insulin injections.        levothyroxine (SYNTHROID/LEVOTHROID) 112 MCG tablet Take 1 tablet (112 mcg) by mouth daily    linaclotide (LINZESS) 145 MCG capsule Take 1 capsule (145 mcg) by mouth every morning (before breakfast)    metoclopramide  (REGLAN) 5 MG tablet Take 2 tablets (10 mg) by mouth 3 times daily    Nutritional Supplements (BOOST HIGH PROTEIN) LIQD Also can use Ensure clear (available over the counter)    nystatin (MYCOSTATIN) 50427 unit/0.5mL SUSP Take 1 mL (100,000 Units) by mouth 4 times daily    ondansetron (ZOFRAN-ODT) 4 MG ODT tab DISSOLVE ONE TABLET ON THE TONGUE EVERY 6 HOURS AS NEEDED FOR NAUSEA AND VOMITING    order for DME by [employer] [facility] route [place], [place]  Ph: [phone]  Fax: [phone]    Nutren 1.5 @ 10 ml/hr with advancement by 10 ml/hr q 8 hours to goal rate of 40 ml/hr.  This will provide 1440 kcals (27 kcal/kg/day), 65 g PRO (1.2 g/kg/day), 730 mL H2O, 169 g CHO and no Fiber daily.       pantoprazole (PROTONIX) 40 MG enteric coated tablet Take 1 tablet (40 mg) by mouth 2 times daily    polyethylene glycol (MIRALAX/GLYCOLAX) packet Take 1 packet by mouth 2 times daily as needed for constipation     potassium chloride SA (K-DUR/KLOR-CON M) 20 MEQ CR tablet Take 1 tablet (20 mEq) by mouth daily    pregabalin (LYRICA) 150 MG capsule Take 1 capsule (150 mg) by mouth 3 times daily    senna-docusate (SENOKOT-S;PERICOLACE) 8.6-50 MG per tablet Take 1-2 tablets by mouth daily as needed for constipation    sodium chloride (OCEAN) 0.65 % nasal spray Spray 1 spray into both nostrils daily as needed for congestion    SUMAtriptan (IMITREX) 50 MG tablet Take 1 tablet (50 mg) by mouth at onset of headache for migraine Take 1 Tab by mouth Once as needed for Migraine Headache. May repeat after two hours.  Maximum dose 200 mg/24 hours.    topiramate (TOPAMAX) 100 MG tablet Take 1 tablet (100 mg) by mouth 2 times daily    traZODone (DESYREL) 100 MG tablet TAKE 1-2 TABLETS BY MOUTH AN HOUR BEFORE BEDTIME    Urine Glucose-Ketones Test STRP 1 strip by In Vitro route 3 times daily Test urine 3 times a day.        Physical exam:   General Appearance: in no apparent distress.       Skin: Normal, no rashes or  jaundice  Heart: Regular rhythm.  Lungs: no audible wheezes or increased work of breathing.  Abdomen: The abdomen is flat, and the wound is Healing well, without hernia.  G/j Tube exit site is clean. The abdomen is non-tender, generalized.    Edema: absent.    Chronic Pancreatitis Latest Ref Rng & Units 9/7/2018 9/17/2018 9/19/2018 9/27/2018 9/27/2018   Weight - - 66.906 kg 62.596 kg 62.596 kg 61.689 kg   AMYLASE 30 - 110 U/L - - - - -   LIPASE 73 - 393 U/L - - - - -   HEMOGLOBIN A1C 0 - 5.6 % - - - - -   C PEPTIDE 0.9 - 6.9 ng/mL 2.8 - - - -   GLUCOSE 70 - 99 mg/dL 118(H) 283(H) - - -   HGB 11.7 - 15.7 g/dL - 11.5(L) - - -    - 450 10e9/L - 382 - - -   ALBUMIN 3.4 - 5.0 g/dL - 3.6 - - -   PREALBUMIN 15 - 45 mg/dL - - - - -   Some encounter information is confidential and restricted. Go to Review Flowsheets activity to see all data.       Assessment and Plan: She is doing fairly well s/p TP-IAT.  Interval progress has been fair.  Postoperative gastric ileus: none  Pancreatic exocrine insufficiency:  No change  Malnutrition:  Patient should eat 6 small meals daily  Diabetes mellitus: Patient educated about importance of insulin intake.  Carb counting and coverage.  Monitor closely  Abdominal pain management: none  Followup: I will see her again in clinic in 1 years    Total time: 25 min, Counselling Time: 15 min.        Again, thank you for allowing me to participate in the care of your patient.      Sincerely,    Carline Moy, SANTOS

## 2018-10-01 ENCOUNTER — TELEPHONE (OUTPATIENT)
Dept: TRANSPLANT | Facility: CLINIC | Age: 53
End: 2018-10-01

## 2018-10-01 NOTE — PROGRESS NOTES
Chronic Pancreatitis Group Progress Note    I had the pleasure of seeing Ms. Chantell Kidd today. She is 2460 days status post total pancreatectomy with islet autotransplant.    Interval events since last visit with me:  NG tube for malnutrition.  Presents today for removal.      The patient reports their current symptoms to be:   GI function:   Nausea:   []  none    [x]  rare    []  often    []  daily  Comment:   Vomiting: [x]  none    []  rare    []  often    []  daily   Comment:   Last BM: Today.  Stools are soft, frequency : .   Appetite: poor  Tube feeds:  Remove NG  Oral food intake: 1-3 per day    Lab Results   Component Value Date    A1C 6.6 08/27/2018    A1C 7.5 02/01/2018    A1C 6.7 12/13/2017      Prescription Medications as of 10/1/2018             ACCU-CHEK MULTICLIX LANCETS MISC by Lancet route. Use to test blood sugar daily or as directed.    acetaminophen 500 MG CAPS Take 1,000 mg by mouth three times a day as needed.    alendronate (FOSAMAX) 70 MG tablet Take 1 tablet (70 mg) by mouth every 7 days On Sundays take first thing in the morning with plain water and remain upright for at least 30 minutes and until after first food of the day    Do not restart Fosamax (alendronate) until your difficulty swallowing has resolved and you have finished the entire course of fluconazole (Diflucan).    alum & mag hydroxide-simethicone (MYLANTA/MAALOX) 200-200-25 MG CHEW chewable tablet Take 1 tablet by mouth 3 times daily as needed for indigestion    amylase-lipase-protease (CREON 12) 84847 units CPEP Take 6 to 8 capsules by mouth with meals and take 4 capsules with snacks. Needs up to 25 capsules per day    aspirin 81 MG tablet Take 81 mg by mouth daily.    blood glucose monitoring (NOEMI CONTOUR NEXT) test strip Use to test blood sugar 8 times daily.    blood glucose monitoring (NO BRAND SPECIFIED) test strip Use to test blood glucoses 6-8 times per day.    canagliflozin (INVOKANA) 100 MG tablet Take 1 tablet (100  mg) by mouth every morning (before breakfast)    clotrimazole (MYCELEX) 10 MG LOZG lozenge Place 1 lozenge (1 Brenda) inside cheek 4 times daily    cyclobenzaprine (FLEXERIL) 5 MG tablet Take 1 tablet (5 mg) by mouth 3 times daily as needed for muscle spasms    diclofenac (FLECTOR) 1.3 % Patch Place 1 patch onto the skin 2 times daily    diclofenac (VOLTAREN) 1 % GEL 2 g Apply 2 g to skin four times a day as needed (to affected upper extremity joint(s)). Maximum 8g/day per joint, 16g/day total.    dicyclomine (BENTYL) 10 MG capsule TAKE ONE CAPSULE BY MOUTH EVERY 6 HOURS AS NEEDED    dronabinol (MARINOL) 2.5 MG capsule Take 2 capsules (5 mg) by mouth 2 times daily as needed    DULoxetine (CYMBALTA) 30 MG EC capsule Take 1 capsule (30 mg) by mouth daily With 60mg capsule for total dose of 90mg    DULoxetine (CYMBALTA) 60 MG EC capsule Take 1 capsule (60 mg) by mouth daily With 30mg capsule for total dose of 90mg    fluconazole (DIFLUCAN) 200 MG tablet Take 2 tabs the first day and 1 tab for the next 13 days    furosemide (LASIX) 20 MG tablet Take 1 tablet (20 mg) by mouth 2 times daily    glucagon (GLUCAGON EMERGENCY) 1 MG kit Inject 1 mg into the muscle once for 1 dose    hydrocortisone (CORTEF) 10 MG tablet Take 10 mg in the morning and 10 mg at bedtime. Watch for hypoglycemia recurrence.    insulin pen needle needle RX# [id number]   Pen Needle UC 31G UF IV Mini   Use 4-8 needles per day for insulin injections.        levothyroxine (SYNTHROID/LEVOTHROID) 112 MCG tablet Take 1 tablet (112 mcg) by mouth daily    linaclotide (LINZESS) 145 MCG capsule Take 1 capsule (145 mcg) by mouth every morning (before breakfast)    metoclopramide (REGLAN) 5 MG tablet Take 2 tablets (10 mg) by mouth 3 times daily    Nutritional Supplements (BOOST HIGH PROTEIN) LIQD Also can use Ensure clear (available over the counter)    nystatin (MYCOSTATIN) 52583 unit/0.5mL SUSP Take 1 mL (100,000 Units) by mouth 4 times daily    ondansetron  (ZOFRAN-ODT) 4 MG ODT tab DISSOLVE ONE TABLET ON THE TONGUE EVERY 6 HOURS AS NEEDED FOR NAUSEA AND VOMITING    order for DME by [employer] route [place], [place]  Ph: [phone]  Fax: [phone]    Nutren 1.5 @ 10 ml/hr with advancement by 10 ml/hr q 8 hours to goal rate of 40 ml/hr.  This will provide 1440 kcals (27 kcal/kg/day), 65 g PRO (1.2 g/kg/day), 730 mL H2O, 169 g CHO and no Fiber daily.       pantoprazole (PROTONIX) 40 MG enteric coated tablet Take 1 tablet (40 mg) by mouth 2 times daily    polyethylene glycol (MIRALAX/GLYCOLAX) packet Take 1 packet by mouth 2 times daily as needed for constipation     potassium chloride SA (K-DUR/KLOR-CON M) 20 MEQ CR tablet Take 1 tablet (20 mEq) by mouth daily    pregabalin (LYRICA) 150 MG capsule Take 1 capsule (150 mg) by mouth 3 times daily    senna-docusate (SENOKOT-S;PERICOLACE) 8.6-50 MG per tablet Take 1-2 tablets by mouth daily as needed for constipation    sodium chloride (OCEAN) 0.65 % nasal spray Spray 1 spray into both nostrils daily as needed for congestion    SUMAtriptan (IMITREX) 50 MG tablet Take 1 tablet (50 mg) by mouth at onset of headache for migraine Take 1 Tab by mouth Once as needed for Migraine Headache. May repeat after two hours.  Maximum dose 200 mg/24 hours.    topiramate (TOPAMAX) 100 MG tablet Take 1 tablet (100 mg) by mouth 2 times daily    traZODone (DESYREL) 100 MG tablet TAKE 1-2 TABLETS BY MOUTH AN HOUR BEFORE BEDTIME    Urine Glucose-Ketones Test STRP 1 strip by In Vitro route 3 times daily Test urine 3 times a day.        Physical exam:   General Appearance: in no apparent distress.       Skin: Normal, no rashes or jaundice  Heart: Regular rhythm.  Lungs: no audible wheezes or increased work of breathing.  Abdomen: The abdomen is flat, and the wound is Healing well, without hernia.  G/j Tube exit site is clean. The abdomen is non-tender, generalized.    Edema: absent.    Chronic Pancreatitis Latest Ref  Rng & Units 9/7/2018 9/17/2018 9/19/2018 9/27/2018 9/27/2018   Weight - - 66.906 kg 62.596 kg 62.596 kg 61.689 kg   AMYLASE 30 - 110 U/L - - - - -   LIPASE 73 - 393 U/L - - - - -   HEMOGLOBIN A1C 0 - 5.6 % - - - - -   C PEPTIDE 0.9 - 6.9 ng/mL 2.8 - - - -   GLUCOSE 70 - 99 mg/dL 118(H) 283(H) - - -   HGB 11.7 - 15.7 g/dL - 11.5(L) - - -    - 450 10e9/L - 382 - - -   ALBUMIN 3.4 - 5.0 g/dL - 3.6 - - -   PREALBUMIN 15 - 45 mg/dL - - - - -   Some encounter information is confidential and restricted. Go to Review Flowsheets activity to see all data.       Assessment and Plan: She is doing fairly well s/p TP-IAT.  Interval progress has been fair.  Postoperative gastric ileus: none  Pancreatic exocrine insufficiency:  No change  Malnutrition:  Patient should eat 6 small meals daily  Diabetes mellitus: Patient educated about importance of insulin intake.  Carb counting and coverage.  Monitor closely  Abdominal pain management: none  Followup: I will see her again in clinic in 1 years    Total time: 25 min, Counselling Time: 15 min.

## 2018-10-01 NOTE — TELEPHONE ENCOUNTER
"Called Chantell. She states that she has been able to eat \"soft \" foods since her [place] removal. So far it has been \"staying down\". No vomiting. I asked her abut her BS and she said they had been running higher, she was at her grandchilds school so said she would send the numbers to Dr. Maher later today.  "

## 2018-10-04 ENCOUNTER — OFFICE VISIT (OUTPATIENT)
Dept: INTERNAL MEDICINE | Facility: CLINIC | Age: 53
End: 2018-10-04
Payer: MEDICARE

## 2018-10-04 VITALS
WEIGHT: 136 LBS | RESPIRATION RATE: 20 BRPM | OXYGEN SATURATION: 95 % | BODY MASS INDEX: 24.87 KG/M2 | DIASTOLIC BLOOD PRESSURE: 71 MMHG | SYSTOLIC BLOOD PRESSURE: 109 MMHG | HEART RATE: 71 BPM

## 2018-10-04 DIAGNOSIS — Z12.31 ENCOUNTER FOR SCREENING MAMMOGRAM FOR MALIGNANT NEOPLASM OF BREAST: ICD-10-CM

## 2018-10-04 DIAGNOSIS — Z94.9 CELL TRANSPLANT: Primary | ICD-10-CM

## 2018-10-04 DIAGNOSIS — Z00.00 ROUTINE HEALTH MAINTENANCE: ICD-10-CM

## 2018-10-04 DIAGNOSIS — E89.1 POST-PANCREATECTOMY DIABETES (H): ICD-10-CM

## 2018-10-04 DIAGNOSIS — Z90.410 POST-PANCREATECTOMY DIABETES (H): ICD-10-CM

## 2018-10-04 DIAGNOSIS — E13.9 POST-PANCREATECTOMY DIABETES (H): ICD-10-CM

## 2018-10-04 ASSESSMENT — PAIN SCALES - GENERAL: PAINLEVEL: EXTREME PAIN (8)

## 2018-10-04 NOTE — PROGRESS NOTES
HPI  52-year-old status post pancreatectomy with associated diabetes chronic pain presents today for reevaluation.  She recently had the feeding tube removed and has been managing to do well up until the last day or 2 when she felt a little more nausea.  She has had no associated fever chills or sweats she has having ongoing back pain.  She is been seen by orthopedics for this and is scheduled for upcoming injection.  She has been using Tylenol on a as needed basis for this with only partial relief.  She never tried taking the Tylenol on a regular basis.  She is using the Marinol and feels that this is been beneficial in helping her symptoms overall including her appetite.  She has managed to maintain her weight.  She is doing some exercise and walking and tolerating this well.    Past and Family hx reviewed and updated    Past Medical History:   Diagnosis Date     Chronic abdominal pain      Chronic pancreatitis (H)     S/P pancreatectomy     Depression with anxiety      Diabetes mellitus (H) 1/2012     Gastro-oesophageal reflux disease      Hypothyroidism 4/23/2015     Kidney stones      Low serum cortisol level (H)      Migraines      Other chronic pain     STOMACH     Other chronic pain     LUMBAR SPINE     Spasm of sphincter of Oddi      Past Surgical History:   Procedure Laterality Date     ARTHROPLASTY CARPOMETACARPAL (THUMB JOINT)  5/2/2014    Procedure: ARTHROPLASTY CARPOMETACARPAL (THUMB JOINT);  Surgeon: Carina Panda MD;  Location: [facility] OR     CHOLECYSTECTOMY  2004     COLONOSCOPY  7/18/2014    Procedure: COLONOSCOPY;  Surgeon: Aurora Sahu MD;  Location:  GI     COLONOSCOPY N/A 8/1/2017    Procedure: COLONOSCOPY;  Colonoscopy and upper endoscopy;  Surgeon: Deirdre Harris MD;  Location:  GI     ENDOSCOPIC RETROGRADE CHOLANGIOPANCREATOGRAM       ENDOSCOPIC RETROGRADE CHOLANGIOPANCREATOGRAM  4/19/2011    Procedure:ENDOSCOPIC RETROGRADE CHOLANGIOPANCREATOGRAM;  Pancreatic Stent Placement       ENDOSCOPIC RETROGRADE CHOLANGIOPANCREATOGRAM  5/26/2011    Procedure:ENDOSCOPIC RETROGRADE CHOLANGIOPANCREATOGRAM; with Pancreatic Stent Removal; Surgeon:DALE MIMS; Location:[facility]     ENDOSCOPY UPPER, COLONOSCOPY, COMBINED  4/25/2012    Procedure:COMBINED ENDOSCOPY UPPER, COLONOSCOPY; Enteroscopy with Bile Duct Stent Removal, Colonoscopy  *Latex Safe Room*; Surgeon:GRACY GODWIN; Location:[facility] OR     ESOPHAGOSCOPY, GASTROSCOPY, DUODENOSCOPY (EGD), COMBINED  5/26/2011    Procedure:COMBINED ESOPHAGOSCOPY, GASTROSCOPY, DUODENOSCOPY (EGD); Surgeon:DALE MIMS; Location[facility]     ESOPHAGOSCOPY, GASTROSCOPY, DUODENOSCOPY (EGD), COMBINED N/A 10/30/2014    Procedure: COMBINED ESOPHAGOSCOPY, GASTROSCOPY, DUODENOSCOPY (EGD), BIOPSY SINGLE OR MULTIPLE;  Surgeon: Sarai Moon MD;  [facility]     ESOPHAGOSCOPY, GASTROSCOPY, DUODENOSCOPY (EGD), COMBINED Left 7/6/2015    Procedure: COMBINED ESOPHAGOSCOPY, GASTROSCOPY, DUODENOSCOPY (EGD), BIOPSY SINGLE OR MULTIPLE;  Surgeon: Thomas Estrada MD;  Location:  GI     ESOPHAGOSCOPY, GASTROSCOPY, DUODENOSCOPY (EGD), COMBINED N/A 7/8/2016    Procedure: COMBINED ESOPHAGOSCOPY, GASTROSCOPY, DUODENOSCOPY (EGD), BIOPSY SINGLE OR MULTIPLE;  Surgeon: Eloy Klein MD;  Location:  GI     ESOPHAGOSCOPY, GASTROSCOPY, DUODENOSCOPY (EGD), COMBINED N/A 8/4/2016    Procedure: COMBINED ESOPHAGOSCOPY, GASTROSCOPY, DUODENOSCOPY (EGD), BIOPSY SINGLE OR MULTIPLE;  Surgeon: Jason Brown MD;  Location:  GI     ESOPHAGOSCOPY, GASTROSCOPY, DUODENOSCOPY (EGD), COMBINED N/A 8/1/2017    Procedure: COMBINED ESOPHAGOSCOPY, GASTROSCOPY, DUODENOSCOPY (EGD);;  Surgeon: Deirdre Harris MD;  Location:  GI     GYN SURGERY      Hysterectomy and USO     [facility] ENDOSCOPY W EUS  7/20/2011    Procedure:COMBINED ENDOSCOPIC ULTRASOUND, ESOPHAGOSCOPY, GASTROSCOPY, DUODENOSCOPY (EGD); Surgeon:CHARANJIT  DARVIN; Location:[facility]     HERNIORRHAPHY VENTRAL N/A 9/15/2016    Procedure: HERNIORRHAPHY VENTRAL;  Surgeon: Juanita Bernabe MD;  [facility]     HYSTERECTOMY  1997 or 1998    USO     INCISION AND DRAINAGE ABDOMEN WASHOUT, COMBINED  8/16/2012    Procedure: COMBINED INCISION AND DRAINAGE ABDOMEN WASHOUT;  ,debridement and Drainage Post Appendectomy;  Surgeon: Ron Austin MD;  [facility]     INJECT TRANSVERSUS ABDOMINIS PLANE (TAP) BLOCK BILATERAL Bilateral 5/26/2016    Procedure: INJECT TRANSVERSUS ABDOMINIS PLANE (TAP) BLOCK BILATERAL;  Surgeon: Leonard Mccallum MD;  [facility]     LAPAROSCOPIC APPENDECTOMY  7/30/2012    Procedure: LAPAROSCOPIC APPENDECTOMY;  Open Appendectomy;  Surgeon: Ron Austin MD;  [facility]     PANCREATECTOMY, TRANSPLANT AUTO ISLET CELL, COMBINED  1/6/2012    Procedure:COMBINED PANCREATECTOMY, TRANSPLANT AUTO ISLET CELL; Total  Pancreatectomy, Auto Islet Transplant, splenectomy, 18fr. transgastric-jejunal feeding tube placement, liver biopsy; Surgeon:PALAK LEE; Location[facility]     REPLACE GASTROSTOMY TUBE, PERCUTANEOUS N/A 8/30/2017    Procedure: REPLACE GASTROSTOMY TUBE, PERCUTANEOUS;  GJ Tube Change;  Surgeon: Jose Nath PA-C;  [facility]     SPLENECTOMY       Family History   Problem Relation Age of Onset     Hypertension Mother      Diabetes Mother      Osteoporosis Mother      Cancer Father      pancreatic cancer     Diabetes Maternal Grandmother      Cardiovascular Maternal Grandmother      Cancer Maternal Grandfather      lung cancer     Cancer Sister      brain     Cancer Sister      liver cancer     Social History     Social History     Marital status:      Spouse name: N/A     Number of children: N/A     Years of education: N/A     Social History Main Topics     Smoking status: Never Smoker     Smokeless tobacco: Never Used     Alcohol use No     Drug use: No     Sexual activity: Yes     Other  Topics Concern     None     Social History Narrative       Exam:  /71 (BP Location: Right arm, Patient Position: Sitting, Cuff Size: Adult Regular)  Pulse 71  Resp 20  Wt 61.7 kg (136 lb)  SpO2 95%  BMI 24.87 kg/m2  136 lbs 0 oz  PHYSICAL EXAMINATION:   The patient is alert, oriented with a clear sensorium.   Skin shows numerous lentigo lesions and a single large seborrheic keratosis over the left shoulder blade no other rashes and good turgor.   Head is normocephalic and atraumatic.   Neck shows no nodes, thyromegaly or bruits.   Back is nontender.   Lungs are clear to auscultation.   Heart shows normal S1 and S2 without murmur or gallop.   Extremities show no edema and no evidence of active synovitis.     ASSESSMENT  1 status post pancreatectomy  2 diabetes secondary to above  3 chronic pain syndrome  4 history of anxiety and depression  5 hypertension well-controlled    Plan  We will have her see take the acetaminophen on the schedule thousand milligrams 3 times a day with an additional thousand as needed for breakthrough pain.  We will update her immunizations with a flu shot and a Prevnar she is needs a mammogram scheduled and we will set this up.  We discussed the potential use of medical marijuana if she fails to get relief from the scheduled acetaminophen and the fact injection.  Over 25 minutes spent with patient the majority in counseling and coordinating care.      This note was completed using Dragon voice recognition software.  Although reviewed after completion, some word and grammatical errors may occur.    Ron Morgan MD  General Internal Medicine  [facility]  [phone]

## 2018-10-04 NOTE — PATIENT INSTRUCTIONS
[facility] Medication Refill Request Information:  * Please contact your pharmacy regarding ANY request for medication refills.  ** [facility] Prescription Fax = [phone]  * Please allow 3 business days for routine medication refills.  * Please allow 5 business days for controlled substance medication refills.     [facility] Test Result notification information:  *You will be notified with in 7-10 days of your appointment day regarding the results of your test.  If you are on MyChart you will be notified as soon as the provider has reviewed the results and signed off on them.    [facility]: [phone]

## 2018-10-04 NOTE — MR AVS SNAPSHOT
After Visit Summary   10/4/2018    Chantell Kidd    MRN: [id number]           Patient Information     Date Of Birth          1965        Visit Information        Provider Department      10/4/2018 10:20 AM Ron Morgan MD [facility]        Today's Diagnoses     Islet Auto Transplant-5,000 + IE/KG Pathology- fat necrosis and fatty infiltration    -  1    Post-pancreatectomy diabetes (H)        Routine health maintenance        Encounter for screening mammogram for malignant neoplasm of breast           Care Instructions    Primary Care Center Medication Refill Request Information:  * Please contact your pharmacy regarding ANY request for medication refills.  ** [facility] = [phone]  * Please allow 3 business days for routine medication refills.  * Please allow 5 business days for controlled substance medication refills.     Primary Care Center Test Result notification information:  *You will be notified with in 7-10 days of your appointment day regarding the results of your test.  If you are on MyChart you will be notified as soon as the provider has reviewed the results and signed off on them.    Primary Care Center: [phone]           Follow-ups after your visit        Your next 10 appointments already scheduled     Oct 10, 2018  1:30 PM CDT   (Arrive by 1:15 PM)   New Patient Visit with John Jacome MD   [facility]iliary ([facility])    [address] [address] [phone]            Oct 18, 2018  8:00 AM CDT   (Arrive by 7:30 AM)   CT SACROILIAC JOINT INJECTION DIAGNOSTIC with URIRCT, UR NEURO RAD, URCT2   [facility] [place], Radiology ([facility])    [address] [place] [address] [phone]           How do I prepare for my exam? (Food and drink instructions) The day before your exam: Drink extra  fluids  at least six 8-ounce glasses (unless your doctor tells you to restrict fluids).  The day of your exam: No eating or drinking for 4 hours before your test. You may take medicine with small sips of water  What should I wear: Please wear loose clothing, such as a sweat suit or jogging clothes. Avoid snaps, zippers and other metal. We may ask you to undress and put on a hospital gown.  How long does the exam take: Plan to spend up to 6 hours at the hospital. The test itself normally takes less than an hour. We will watch for side effects for 1 to 4 hours.  What should I bring: Please bring any scans or X-rays taken at other hospitals, if they may be helpful. Also bring a list of your medicines, including vitamins, minerals and over-the-counter drugs. It is safest to leave personal items at home.  Do I need a : Plan for an adult to drive you home and stay with you until morning.  What do I need to tell my doctor? Tell your doctor in advance: * If you have any allergies. * If you are breastfeeding or there s any chance you are pregnant. * If you are taking Coumadin (or any other blood thinners) 5 days prior to the exam for any special instructions. * If you are diabetic to determine if your insulin needs have to be adjusted for the exam.  What should I do after the exam: When you get home, you ll need to take it easy for the rest of the day.  Do not drive for 24 hours. You may have slight bruising and mild pain in the area. If so, you may take acetaminophen (Tylenol) or ibuprofen (Motrin, Advil) after you get home.  Some people go home with a small tube (catheter) sewn into the skin. If this case, we will show you how to care for the tube.  What is this test: This test uses a very thin needle to remove tissue, fluid or other cells from your body. We then send the cells to a lab for testing. A biopsy tests for disease in a tissue sample. Aspiration tests for disease or infection in a fluid sample. We use  pictures from a CT (computed tomography) scan to guide the needle to the right place. A CT scan is a special X-ray. The scanner creates images of the body in cross sections, much like slices of bread. You may receive contrast (X-ray dye) before or during your scan. Contrast is given through an IV (small needle in your arm) or taken by mouth.  Who should I call with questions: If you have any questions, please call the imaging department where you will have your exam. Directions, parking instructions, and other information is available on our website, [email or web address].            Nov 30, 2018  2:50 PM CST   (Arrive by 2:35 PM)   New Patient Visit with B[facility] ([facility])    [address] [place] [address]  4th Floor  [facility] [address] [phone]            Dec 20, 2018 11:20 AM CST   (Arrive by 11:05 AM)   Return Auto Islet with Amena Maher MD   [facility] ([facility])    [address] [place] [address]  [address]  [facility] [address] [phone]              Future tests that were ordered for you today     Open Future Orders        Priority Expected Expires Ordered    Mammogram, routine screening Routine  10/4/2019 10/4/2018    Lipid Profile Routine  10/4/2019 10/4/2018            Who to contact     Please call your clinic at [phone] to:    Ask questions about your health    Make or cancel appointments    Discuss your medicines    Learn about your test results    Speak to your doctor            Additional Information About Your Visit        [facility] Information     [employer] gives you secure access to your electronic health record. If you see a primary care provider, you can also send messages to your care team and make appointments. If you have questions, please call your primary care clinic.  If you do not have a primary care provider, please call [phone] and  they will assist you.      [employer] is an electronic gateway that provides easy, online access to your medical records. With [employer], you can request a clinic appointment, read your test results, renew a prescription or communicate with your care team.     To access your existing account, please contact your [facility] or call [phone] for assistance.        Care EveryWhere ID     This is your Care EveryWhere ID. This could be used by other organizations to access your [facility] medical records  [id number]        Your Vitals Were     Pulse Respirations Pulse Oximetry BMI (Body Mass Index)          71 20 95% 24.87 kg/m2         Blood Pressure from Last 3 Encounters:   10/04/18 109/71   09/27/18 112/73   09/19/18 105/70    Weight from Last 3 Encounters:   10/04/18 61.7 kg (136 lb)   09/27/18 61.7 kg (136 lb)   09/27/18 62.6 kg (138 lb)              We Performed the Following     ADMIN: Vaccine, Initial (00457)     FLU VACCINE, AGE >= 3 YR     Pneumococcal vaccine 13 valent PCV13 IM (Prevnar) [56105]        Primary Care Provider Office Phone # Fax #    Ron Morgan [phone]       [address] [place] [address]        Equal Access to Services     BRIDGETTE JOHNSON : Hadii jose maria mims hadasho Soomaali, waaxda luqadaha, qaybta kaalmada adeegyada, bambi flowers. So [facility] [place] [phone].    ATENCIÓN: Si habla español, tiene a webb disposición servicios gratuitos de asistencia lingüística. Llame al [phone].    We comply with applicable federal civil rights laws and [place] laws. We do not discriminate on the basis of race, color, national origin, age, disability, sex, sexual orientation, or gender identity.            Thank you!     Thank you for choosing [facility]  for your care. Our goal is always to provide you with excellent care. Hearing back from our patients is one way we can continue to improve our  services. Please take a few minutes to complete the written survey that you may receive in the mail after your visit with us. Thank you!             Your Updated Medication List - Protect others around you: Learn how to safely use, store and throw away your medicines at www.disposemymeds.org.          This list is accurate as of 10/4/18 10:46 AM.  Always use your most recent med list.                   Brand Name Dispense Instructions for use Diagnosis    acetaminophen 500 MG Caps      Take 1,000 mg by mouth three times a day as needed.        alendronate 70 MG tablet    FOSAMAX    4 tablet    Take 1 tablet (70 mg) by mouth every 7 days On Sundays take first thing in the morning with plain water and remain upright for at least 30 minutes and until after first food of the day  Do not restart Fosamax (alendronate) until your difficulty swallowing has resolved and you have finished the entire course of fluconazole (Diflucan).    Osteoporosis       alum & mag hydroxide-simethicone 200-200-25 MG Chew chewable tablet    MYLANTA/MAALOX     Take 1 tablet by mouth 3 times daily as needed for indigestion        amylase-lipase-protease 43148 units Cpep    CREON 12    750 capsule    Take 6 to 8 capsules by mouth with meals and take 4 capsules with snacks. Needs up to 25 capsules per day    Exocrine pancreatic insufficiency       aspirin 81 MG tablet      Take 81 mg by mouth daily.        blood glucose monitoring lancets     102 each    by Lancet route. Use to test blood sugar daily or as directed.    Chronic abdominal pain       * blood glucose monitoring test strip    no brand specified    240 each    Use to test blood glucoses 6-8 times per day.    Post-pancreatectomy diabetes (H)       * blood glucose monitoring test strip    NOEMI CONTOUR NEXT    240 each    Use to test blood sugar 8 times daily.    Post-pancreatectomy diabetes (H)       BOOST HIGH PROTEIN Liqd      Also can use Ensure clear (available over the counter)     Pancreatic insufficiency       canagliflozin 100 MG tablet    INVOKANA    30 tablet    Take 1 tablet (100 mg) by mouth every morning (before breakfast)    Post-pancreatectomy diabetes (H)       clotrimazole 10 MG Lozg lozenge    MYCELEX    70 each    Place 1 lozenge (1 Brenda) inside cheek 4 times daily    Thrush       cyclobenzaprine 5 MG tablet    FLEXERIL    42 tablet    Take 1 tablet (5 mg) by mouth 3 times daily as needed for muscle spasms    Muscle spasm       diclofenac 1 % Gel topical gel    VOLTAREN     2 g Apply 2 g to skin four times a day as needed (to affected upper extremity joint(s)). Maximum 8g/day per joint, 16g/day total.        diclofenac 1.3 % Patch    FLECTOR    30 each    Place 1 patch onto the skin 2 times daily    Generalized abdominal pain       dicyclomine 10 MG capsule    BENTYL    40 capsule    TAKE ONE CAPSULE BY MOUTH EVERY 6 HOURS AS NEEDED    Abdominal pain, epigastric       dronabinol 2.5 MG capsule    MARINOL    56 capsule    Take 2 capsules (5 mg) by mouth 2 times daily as needed    Routine health maintenance       * DULoxetine 30 MG EC capsule    CYMBALTA    90 capsule    Take 1 capsule (30 mg) by mouth daily With 60mg capsule for total dose of 90mg    Major depressive disorder, recurrent episode, moderate (H), CIERRA (generalized anxiety disorder)       * DULoxetine 60 MG EC capsule    CYMBALTA    90 capsule    Take 1 capsule (60 mg) by mouth daily With 30mg capsule for total dose of 90mg    Major depressive disorder, recurrent episode, moderate (H), CIERRA (generalized anxiety disorder)       fluconazole 200 MG tablet    DIFLUCAN    15 tablet    Take 2 tabs the first day and 1 tab for the next 13 days        furosemide 20 MG tablet    LASIX    180 tablet    Take 1 tablet (20 mg) by mouth 2 times daily    Edema, unspecified type       glucagon 1 MG kit    GLUCAGON EMERGENCY    1 mg    Inject 1 mg into the muscle once for 1 dose    Hypoglycemia unawareness in type 1 diabetes mellitus  (H), Post-pancreatectomy diabetes (H)       hydrocortisone 10 MG tablet    CORTEF    60 tablet    Take 10 mg in the morning and 10 mg at bedtime. Watch for hypoglycemia recurrence.    Hypoglycemia, Adrenal insufficiency (H)       insulin pen needle 31G X 5 MM     2 Box    RX# [id number]  Pen Needle UC 31G UF IV Mini  Use 4-8 needles per day for insulin injections.    Chronic abdominal pain       levothyroxine 112 MCG tablet    SYNTHROID/LEVOTHROID    30 tablet    Take 1 tablet (112 mcg) by mouth daily    Hypothyroidism       linaclotide 145 MCG capsule    LINZESS    30 capsule    Take 1 capsule (145 mcg) by mouth every morning (before breakfast)    Constipation, unspecified constipation type, Chronic back pain, unspecified back location, unspecified back pain laterality, Physical deconditioning, Primary insomnia       metoclopramide 5 MG tablet    REGLAN    180 tablet    Take 2 tablets (10 mg) by mouth 3 times daily    Nausea       nystatin 146361 unit/mL Susp suspension    MYCOSTATIN    60 mL    Take 1 mL (100,000 Units) by mouth 4 times daily    Odynophagia       ondansetron 4 MG ODT tab    ZOFRAN-ODT    60 tablet    DISSOLVE ONE TABLET ON THE TONGUE EVERY 6 HOURS AS NEEDED FOR NAUSEA AND VOMITING    Nausea       order for DME     1 Month    by [employer] route [place], [place] Ph: [phone] Fax: [phone]  Nutren 1.5 @ 10 ml/hr with advancement by 10 ml/hr q 8 hours to goal rate of 40 ml/hr.  This will provide 1440 kcals (27 kcal/kg/day), 65 g PRO (1.2 g/kg/day), 730 mL H2O, 169 g CHO and no Fiber daily.    Hypoglycemia, Nausea, Decreased oral intake, Exocrine pancreatic insufficiency, Type 1 diabetes mellitus with hypoglycemia and without coma (H), Generalized abdominal pain, Post-pancreatectomy diabetes (H), Cell transplant, On enteral nutrition       pantoprazole 40 MG EC tablet    PROTONIX    180 tablet    Take 1 tablet (40 mg) by mouth 2 times daily    H. pylori infection        polyethylene glycol Packet    MIRALAX/GLYCOLAX    14 each    Take 1 packet by mouth 2 times daily as needed for constipation    Chronic constipation       potassium chloride SA 20 MEQ CR tablet    K-DUR/KLOR-CON M    90 tablet    Take 1 tablet (20 mEq) by mouth daily    Hypokalemia       pregabalin 150 MG capsule    LYRICA    90 capsule    Take 1 capsule (150 mg) by mouth 3 times daily    Chronic generalized abdominal pain       senna-docusate 8.6-50 MG per tablet    SENOKOT-S;PERICOLACE    60 tablet    Take 1-2 tablets by mouth daily as needed for constipation    Constipation       sodium chloride 0.65 % nasal spray    OCEAN    30 mL    Spray 1 spray into both nostrils daily as needed for congestion    Irritation of nose       SUMAtriptan 50 MG tablet    IMITREX    30 tablet    Take 1 tablet (50 mg) by mouth at onset of headache for migraine Take 1 Tab by mouth Once as needed for Migraine Headache. May repeat after two hours.  Maximum dose 200 mg/24 hours.    Migraine       topiramate 100 MG tablet    TOPAMAX    180 tablet    Take 1 tablet (100 mg) by mouth 2 times daily    Migraine, unspecified, not intractable, without status migrainosus       traZODone 100 MG tablet    DESYREL    100 tablet    TAKE 1-2 TABLETS BY MOUTH AN HOUR BEFORE BEDTIME    Primary insomnia, Constipation, unspecified constipation type, Chronic back pain, unspecified back location, unspecified back pain laterality, Physical deconditioning       Urine Glucose-Ketones Test Strp     30 each    1 strip by In Vitro route 3 times daily Test urine 3 times a day.    Elevated blood sugar       * Notice:  This list has 4 medication(s) that are the same as other medications prescribed for you. Read the directions carefully, and ask your doctor or other care provider to review them with you.

## 2018-10-04 NOTE — NURSING NOTE
.            Chantell Kidd      1.  Has the patient received the information for the influenza vaccine? YES    2.  Does the patient have any of the following contraindications?     Allergy to eggs?  No     Allergic reaction to previous influenza vaccines?  No     Any other problems to previous influenza vaccines?  No     Paralyzed by Guillain-Oakley syndrome?  No     Currently pregnant? NO     Current moderate or severe illness?  No     Allergy to contact lens solution?  No    3.  The vaccine has been administered in the usual fashion and the patient was instructed to wait 20 minutes before leaving the building in the event of an allergic reaction: YES    Vaccination given by Stefanie Dougherty LPN 10:58 AM on 10/4/2018    .  Recorded by Stefanie Dougherty  Flu and Prevnar 13 shots given without problems,patient tolerated procedures well.Stefanie Dougherty LPN 10:59 AM on 10/4/2018

## 2018-10-04 NOTE — NURSING NOTE
Chief Complaint   Patient presents with     Pain     follow up pain in bak[facility] /stomach   Stefanie Dougherty LPN 10:13 AM on 10/4/2018

## 2018-10-05 ENCOUNTER — CARE COORDINATION (OUTPATIENT)
Dept: GASTROENTEROLOGY | Facility: CLINIC | Age: 53
End: 2018-10-05

## 2018-10-05 NOTE — PROGRESS NOTES
Spoke with patient to include the following information for their clinic visit.    I am contacting you to confirm your appointment with Dr. Jacome at 1:30PM on 10/10/2018. Please check in 15 min early.     [facility], 4th floor.  [address] [place] [address]    If you cannot make it to this appointment and would like to reschedule, please call us at [phone].

## 2018-10-15 ENCOUNTER — TELEPHONE (OUTPATIENT)
Dept: TRANSPLANT | Facility: CLINIC | Age: 53
End: 2018-10-15

## 2018-10-15 NOTE — TELEPHONE ENCOUNTER
Spoke to Chantell this am. She did not realize she has missed her appointment with Dr. Jacome thinking it was this week. She reports very occasional vomiting but that for the most port she is able to keep food and liquids down. She will send updated blood sugar records to Dr. Maher this morning.

## 2018-10-18 DIAGNOSIS — M54.9 CHRONIC BACK PAIN, UNSPECIFIED BACK LOCATION, UNSPECIFIED BACK PAIN LATERALITY: ICD-10-CM

## 2018-10-18 DIAGNOSIS — R53.81 PHYSICAL DECONDITIONING: ICD-10-CM

## 2018-10-18 DIAGNOSIS — K59.00 CONSTIPATION, UNSPECIFIED CONSTIPATION TYPE: ICD-10-CM

## 2018-10-18 DIAGNOSIS — F51.01 PRIMARY INSOMNIA: ICD-10-CM

## 2018-10-18 DIAGNOSIS — A04.8 H. PYLORI INFECTION: ICD-10-CM

## 2018-10-18 DIAGNOSIS — G89.29 CHRONIC BACK PAIN, UNSPECIFIED BACK LOCATION, UNSPECIFIED BACK PAIN LATERALITY: ICD-10-CM

## 2018-10-19 RX ORDER — TRAZODONE HYDROCHLORIDE 100 MG/1
TABLET ORAL
Qty: 100 TABLET | Refills: 1 | Status: SHIPPED | OUTPATIENT
Start: 2018-10-19 | End: 2019-06-06

## 2018-10-19 RX ORDER — PANTOPRAZOLE SODIUM 40 MG/1
40 TABLET, DELAYED RELEASE ORAL 2 TIMES DAILY
Qty: 180 TABLET | Refills: 3 | Status: SHIPPED | OUTPATIENT
Start: 2018-10-19

## 2018-10-23 DIAGNOSIS — E03.9 HYPOTHYROIDISM: ICD-10-CM

## 2018-10-23 RX ORDER — LEVOTHYROXINE SODIUM 112 UG/1
112 TABLET ORAL DAILY
Qty: 90 TABLET | Refills: 3 | Status: SHIPPED | OUTPATIENT
Start: 2018-10-23

## 2018-10-29 DIAGNOSIS — F51.01 PRIMARY INSOMNIA: ICD-10-CM

## 2018-10-29 DIAGNOSIS — R53.81 PHYSICAL DECONDITIONING: ICD-10-CM

## 2018-10-29 DIAGNOSIS — K59.00 CONSTIPATION, UNSPECIFIED CONSTIPATION TYPE: ICD-10-CM

## 2018-10-29 DIAGNOSIS — G89.29 CHRONIC BACK PAIN, UNSPECIFIED BACK LOCATION, UNSPECIFIED BACK PAIN LATERALITY: ICD-10-CM

## 2018-10-29 DIAGNOSIS — M54.9 CHRONIC BACK PAIN, UNSPECIFIED BACK LOCATION, UNSPECIFIED BACK PAIN LATERALITY: ICD-10-CM

## 2018-10-29 NOTE — TELEPHONE ENCOUNTER
linaclotide (LINZESS) 145 MCG capsule      Last Written Prescription Date:  4/16/18  Last Fill Quantity: 30,   # refills: 0  Last Office Visit : 10/4/18  Future Office visit:  none    Routing refill request to provider for review/approval because:  Drug not on the refill protocol.

## 2018-11-16 DIAGNOSIS — Z00.00 ROUTINE HEALTH MAINTENANCE: ICD-10-CM

## 2018-11-16 RX ORDER — DRONABINOL 2.5 MG/1
5 CAPSULE ORAL 2 TIMES DAILY PRN
Qty: 56 CAPSULE | Refills: 5 | Status: SHIPPED | OUTPATIENT
Start: 2018-11-16

## 2018-11-16 NOTE — TELEPHONE ENCOUNTER
Last office visit 10/04/18. [facility] site reviewed 11/16/18. Last marinol refill received 9/17/18. Will send to PCP for review.    Jahaira Mi RN    ---------------------------    Rx faxed to [id number].    Jahaira Mi RN  11/16/2018 4:11 PM

## 2018-12-17 DIAGNOSIS — E13.9 POST-PANCREATECTOMY DIABETES (H): Primary | ICD-10-CM

## 2018-12-17 DIAGNOSIS — Z90.410 POST-PANCREATECTOMY DIABETES (H): Primary | ICD-10-CM

## 2018-12-17 DIAGNOSIS — K86.89 PANCREATIC INSUFFICIENCY: ICD-10-CM

## 2018-12-17 DIAGNOSIS — E89.1 POST-PANCREATECTOMY DIABETES (H): Primary | ICD-10-CM

## 2018-12-17 DIAGNOSIS — Z13.21 ENCOUNTER FOR VITAMIN DEFICIENCY SCREENING: ICD-10-CM

## 2018-12-20 DIAGNOSIS — R10.13 ABDOMINAL PAIN, EPIGASTRIC: ICD-10-CM

## 2018-12-21 RX ORDER — DICYCLOMINE HYDROCHLORIDE 10 MG/1
CAPSULE ORAL
Qty: 40 CAPSULE | Refills: 3 | Status: CANCELLED | OUTPATIENT
Start: 2018-12-21

## 2018-12-21 NOTE — TELEPHONE ENCOUNTER
Roselia Ronquillo, Mahsa Santos RN 19 hours ago (4:32 PM)      We have not seen this pt in over 2 years, and the provider she last saw is no longer with the clinic.  Pt no showed for appointment 07/2017 with Dr. Sosa.       We will not be refilling this medication. Pt may follow up with PCP or she is welcome to schedule a clinic visit with us to re-establish care.     Thank you,   GOLD Veloz   Pain Clinic  (Routing comment)        [facility] pharmacy called and informed.

## 2018-12-26 RX ORDER — DIPHENHYDRAMINE HCL 25 MG
25 TABLET ORAL
Status: CANCELLED
Start: 2018-12-26

## 2018-12-26 RX ORDER — ACETAMINOPHEN 325 MG/1
650 TABLET ORAL
Status: CANCELLED
Start: 2018-12-26

## 2019-01-14 DIAGNOSIS — R10.13 ABDOMINAL PAIN, EPIGASTRIC: ICD-10-CM

## 2019-01-14 NOTE — TELEPHONE ENCOUNTER
Health Call Center    Phone Message    May a detailed message be left on voicemail: yes, Christiana can be reached by phone at [phone]. Christiana can be reached by fax at [phone].    Reason for Call: Medication Refill Request    Has the patient contacted the pharmacy for the refill? Yes   Name of medication being requested: dicyclomine (BENTYL) 10 MG capsule  Provider who prescribed the medication: Ron Hou  Pharmacy: [id number] [facility]  Date medication is needed: 1/14/2019       Action Taken: Message routed to:  [facility] ([facility]): pcc

## 2019-01-16 RX ORDER — DICYCLOMINE HYDROCHLORIDE 10 MG/1
CAPSULE ORAL
Qty: 40 CAPSULE | Refills: 3 | Status: SHIPPED | OUTPATIENT
Start: 2019-01-16 | End: 2019-06-10

## 2019-02-01 NOTE — PROGRESS NOTES
Spoke to Chantell and gave her date of 3/7/19 for rescheduled appointment with Dr Maher.She is aware , that due to Medicare requirements she may not received her pump supplies since she has not been seen within the 3 month window.

## 2019-03-04 DIAGNOSIS — Z90.410 POST-PANCREATECTOMY DIABETES (H): Primary | ICD-10-CM

## 2019-03-04 DIAGNOSIS — E13.9 POST-PANCREATECTOMY DIABETES (H): Primary | ICD-10-CM

## 2019-03-04 DIAGNOSIS — K86.89 PANCREATIC INSUFFICIENCY: ICD-10-CM

## 2019-03-04 DIAGNOSIS — E89.1 POST-PANCREATECTOMY DIABETES (H): Primary | ICD-10-CM

## 2019-03-14 DIAGNOSIS — F33.1 MAJOR DEPRESSIVE DISORDER, RECURRENT EPISODE, MODERATE (H): ICD-10-CM

## 2019-03-14 DIAGNOSIS — R60.9 EDEMA, UNSPECIFIED TYPE: ICD-10-CM

## 2019-03-14 DIAGNOSIS — K59.00 CONSTIPATION: ICD-10-CM

## 2019-03-14 DIAGNOSIS — F41.1 GAD (GENERALIZED ANXIETY DISORDER): ICD-10-CM

## 2019-03-14 DIAGNOSIS — R11.0 NAUSEA: ICD-10-CM

## 2019-03-14 DIAGNOSIS — M62.838 MUSCLE SPASM: ICD-10-CM

## 2019-03-15 RX ORDER — AMOXICILLIN 250 MG
1-2 CAPSULE ORAL DAILY PRN
Qty: 60 TABLET | Refills: 3 | Status: SHIPPED | OUTPATIENT
Start: 2019-03-15

## 2019-03-15 RX ORDER — FUROSEMIDE 20 MG
20 TABLET ORAL 2 TIMES DAILY
Qty: 180 TABLET | Refills: 1 | Status: ON HOLD | OUTPATIENT
Start: 2019-03-15 | End: 2019-06-14

## 2019-03-15 RX ORDER — METOCLOPRAMIDE 5 MG/1
10 TABLET ORAL 3 TIMES DAILY
Qty: 180 TABLET | Refills: 3 | Status: SHIPPED | OUTPATIENT
Start: 2019-03-15

## 2019-03-15 RX ORDER — DULOXETIN HYDROCHLORIDE 30 MG/1
30 CAPSULE, DELAYED RELEASE ORAL DAILY
Qty: 90 CAPSULE | Refills: 0 | Status: SHIPPED | OUTPATIENT
Start: 2019-03-15

## 2019-03-15 NOTE — TELEPHONE ENCOUNTER
cyclobenzaprine (FLEXERIL) 5 MG tablet       Last Written Prescription Date:  9/26/2018  Last Fill Quantity: 42,   # refills: 0  Last Office Visit : 11/16/2018  Future Office visit:  none    Routing refill request to provider for review/approval because:  Drug not on refill protocol

## 2019-03-18 RX ORDER — CYCLOBENZAPRINE HCL 5 MG
5 TABLET ORAL 3 TIMES DAILY PRN
Qty: 42 TABLET | Refills: 0 | Status: SHIPPED | OUTPATIENT
Start: 2019-03-18 | End: 2020-11-08

## 2019-03-19 DIAGNOSIS — E27.40 ADRENAL INSUFFICIENCY (H): ICD-10-CM

## 2019-03-19 DIAGNOSIS — E16.2 HYPOGLYCEMIA: ICD-10-CM

## 2019-03-19 RX ORDER — HYDROCORTISONE 10 MG/1
TABLET ORAL
Qty: 60 TABLET | Refills: 1 | Status: SHIPPED | OUTPATIENT
Start: 2019-03-19 | End: 2020-11-08

## 2019-03-20 ENCOUNTER — TELEPHONE (OUTPATIENT)
Dept: TRANSPLANT | Facility: CLINIC | Age: 54
End: 2019-03-20

## 2019-03-21 NOTE — TELEPHONE ENCOUNTER
[facility] pertaining to her Insulin pump. She was able to tell me it is not functioning well and her BS have been 300-500. She is awaiting a replacement pump. Informed Dr Maher. Called Chantell back this evening at 7:45 and asked her to e-mail Dr Maher with her current pump settings incase we have to transition her temporarily to Insulin pens.She will e-mail this information tonight.

## 2019-03-22 DIAGNOSIS — E89.1 POST-PANCREATECTOMY DIABETES (H): Primary | ICD-10-CM

## 2019-03-22 DIAGNOSIS — G89.29 CHRONIC ABDOMINAL PAIN: ICD-10-CM

## 2019-03-22 DIAGNOSIS — R10.9 CHRONIC ABDOMINAL PAIN: ICD-10-CM

## 2019-03-22 DIAGNOSIS — Z90.410 POST-PANCREATECTOMY DIABETES (H): Primary | ICD-10-CM

## 2019-03-22 DIAGNOSIS — E13.9 POST-PANCREATECTOMY DIABETES (H): Primary | ICD-10-CM

## 2019-03-25 ENCOUNTER — DOCUMENTATION ONLY (OUTPATIENT)
Dept: TRANSPLANT | Facility: CLINIC | Age: 54
End: 2019-03-25

## 2019-03-25 NOTE — PROGRESS NOTES
"Recent e-mail correspondance below...generated by Dena reports of pump malfunction.      Okay will do! and i will email you on Monday with my blood sugars from over the weekend.     Thanks  Chantell Kidd   -----------------------------------------  From: \"Amena Maher MD\"   To: [email or web address]  Cc: Leslie KENYON\"  Sent: Saturday March 23 2019 10:32:01AM  Subject: Re: blood sugars and insulin regimen  Hi Chantell. I hear we won t have the echo pen until Monday.      With the syringes this weekend you can use the same scale and just round down to the nearest full unit.      On Sat, Mar 23, 2019 at 10:05 AM <[email or web address] wrote:  Okay Thanks!     And Leslie blancas for calling [facility] about the prescription today I really appreciate it.        Thanks    Chantell Kidd      -----------------------------------------  From: \"Amena Maher MD\"   To: Leslie KENYON\"  Cc: \"Chantell Kidd\"  Sent: Friday March 22 2019 4:27:59PM  Subject: Re: blood sugars and insulin regimen  As soon as she has that insulin take the first dose tresiba and stop pump at same time. Please send us some numbers after a couple days      On Fri, Mar 22, 2019 at 12:14 PM Leslie Cortez <[email or web address]> wrote:  Looks like the prescriptions went through for Novolog ( echo pen and penfills )and Tresiba.     Dr Maher, can you please clarify when Chantell should remove her Insulin pump and take her first dose of Tresiba?        From: Amena Maher MD [[email or web address]   Sent: Thursday, March 21, 2019 12:29 PM  To: Chantell Kidd  Cc: Leslie Cortez  Subject: Re: blood sugars and insulin regimen     Hi, Chantell.     It will be a bit easier with the private insurance in general, vs Medicare.  They probably won't require you to submit logs (although some do) but they will require you to have had a clinic visit in the past 6 months which you have not had, since you missed your last few appointments.  So I suspect the pump will not be covered by [employer] until we see you " "in clinic again.  It is also a very different system than your old pump, so you will probably need a couple visits with the pump / diabetes educator to get fully up and running, especially if we put you in auto mode which I think would be an excellent system for you.     In the meantime, it sounds like your current pump is not safe to remain on, may not be delivering insulin consistently, so I think the safest plan is to get you insulin pens until we get your new pump.     What I would do is change your basal over to Tresiba, which is a more consistent daily basal insulin.  And get you a humalog jannie or novolog echo pen.     Then based on your current settings, and your #s which are high, I would start with  Tresiba 8 units  Novolog/ Humalog 0.5 units per 15 grams, and I would use for you a correction scale of 0.5 units for every 50 points above 150 mg/dL.       Amena Maher MD  [facility]       On Thu, Mar 21, 2019 at 10:39 AM <[email or web address] wrote:  My  blood sugars-  292, 272 now, 183,239,224,316,232,324,251,197,302,246,220,277,159,448,446,213,333,545,425,355,321,293,258,396,202,233,243,293,383,251,409,269,291,250,256,393     Basel standard 6.875 Max rate 2.00   Carb ratio is 1 for every 32      Also I wanted you to know that I have insurance coverage from [facility] [place]. That why I'm able to get the 670G pump.         Please let me know if you need any further info from me.   -----------------------------------------  From: \"Amena Maher MD\"   To: \"Chantell Kidd\"  Cc: \"Leslie Lebron\"  Sent: Thursday March 21 2019 7:35:52AM  Subject: blood sugars and insulin regimen  Chantell Hilton.      I talked to Leslie yesterday.  Remember to send me blood sugars today, and can you also let me know your current pump settings?  I don't think it is safe for you to stay on that pump at this point-- it's too old and risk of malfunction.  But we have some new long acting insulin pens out on " the market so I think we can get a good plan in place until you can get a new pump.      Medicare will need you to have a visit with me, and will also need you to keep logs of 4 blood sugars each day for medicare approval x 4 weeks.

## 2019-04-18 ENCOUNTER — OFFICE VISIT (OUTPATIENT)
Dept: TRANSPLANT | Facility: CLINIC | Age: 54
End: 2019-04-18
Attending: PEDIATRICS
Payer: MEDICARE

## 2019-04-18 VITALS
OXYGEN SATURATION: 98 % | DIASTOLIC BLOOD PRESSURE: 73 MMHG | WEIGHT: 134.7 LBS | SYSTOLIC BLOOD PRESSURE: 107 MMHG | HEART RATE: 71 BPM | BODY MASS INDEX: 24.64 KG/M2

## 2019-04-18 DIAGNOSIS — Z90.410 POST-PANCREATECTOMY DIABETES (H): Primary | ICD-10-CM

## 2019-04-18 DIAGNOSIS — Z90.410 POST-PANCREATECTOMY DIABETES (H): ICD-10-CM

## 2019-04-18 DIAGNOSIS — K86.89 PANCREATIC INSUFFICIENCY: ICD-10-CM

## 2019-04-18 DIAGNOSIS — E13.9 POST-PANCREATECTOMY DIABETES (H): ICD-10-CM

## 2019-04-18 DIAGNOSIS — E89.1 POST-PANCREATECTOMY DIABETES (H): Primary | ICD-10-CM

## 2019-04-18 DIAGNOSIS — E13.9 POST-PANCREATECTOMY DIABETES (H): Primary | ICD-10-CM

## 2019-04-18 DIAGNOSIS — E89.1 POST-PANCREATECTOMY DIABETES (H): ICD-10-CM

## 2019-04-18 LAB
ALBUMIN SERPL-MCNC: 3.7 G/DL (ref 3.4–5)
ALP SERPL-CCNC: 130 U/L (ref 40–150)
ALT SERPL W P-5'-P-CCNC: 67 U/L (ref 0–50)
ANION GAP SERPL CALCULATED.3IONS-SCNC: 9 MMOL/L (ref 3–14)
AST SERPL W P-5'-P-CCNC: 77 U/L (ref 0–45)
BASOPHILS # BLD AUTO: 0.1 10E9/L (ref 0–0.2)
BASOPHILS NFR BLD AUTO: 0.9 %
BILIRUB SERPL-MCNC: 0.4 MG/DL (ref 0.2–1.3)
BUN SERPL-MCNC: 11 MG/DL (ref 7–30)
C PEPTIDE SERPL-MCNC: 1.8 NG/ML (ref 0.9–6.9)
CALCIUM SERPL-MCNC: 9 MG/DL (ref 8.5–10.1)
CHLORIDE SERPL-SCNC: 100 MMOL/L (ref 94–109)
CO2 SERPL-SCNC: 24 MMOL/L (ref 20–32)
CREAT SERPL-MCNC: 0.53 MG/DL (ref 0.52–1.04)
DIFFERENTIAL METHOD BLD: NORMAL
EOSINOPHIL # BLD AUTO: 0.1 10E9/L (ref 0–0.7)
EOSINOPHIL NFR BLD AUTO: 1.1 %
ERYTHROCYTE [DISTWIDTH] IN BLOOD BY AUTOMATED COUNT: 13.1 % (ref 10–15)
FERRITIN SERPL-MCNC: 34 NG/ML (ref 8–252)
GFR SERPL CREATININE-BSD FRML MDRD: >90 ML/MIN/{1.73_M2}
GLUCOSE SERPL-MCNC: 382 MG/DL (ref 70–99)
HBA1C MFR BLD: 9.6 % (ref 0–5.6)
HCT VFR BLD AUTO: 37.2 % (ref 35–47)
HGB BLD-MCNC: 12.6 G/DL (ref 11.7–15.7)
IMM GRANULOCYTES # BLD: 0 10E9/L (ref 0–0.4)
IMM GRANULOCYTES NFR BLD: 0.3 %
IRON SATN MFR SERPL: 16 % (ref 15–46)
IRON SERPL-MCNC: 48 UG/DL (ref 35–180)
LYMPHOCYTES # BLD AUTO: 3 10E9/L (ref 0.8–5.3)
LYMPHOCYTES NFR BLD AUTO: 37.2 %
MCH RBC QN AUTO: 30.4 PG (ref 26.5–33)
MCHC RBC AUTO-ENTMCNC: 33.9 G/DL (ref 31.5–36.5)
MCV RBC AUTO: 90 FL (ref 78–100)
MONOCYTES # BLD AUTO: 0.5 10E9/L (ref 0–1.3)
MONOCYTES NFR BLD AUTO: 6 %
NEUTROPHILS # BLD AUTO: 4.4 10E9/L (ref 1.6–8.3)
NEUTROPHILS NFR BLD AUTO: 54.5 %
NRBC # BLD AUTO: 0 10*3/UL
NRBC BLD AUTO-RTO: 0 /100
PLATELET # BLD AUTO: 353 10E9/L (ref 150–450)
POTASSIUM SERPL-SCNC: 4 MMOL/L (ref 3.4–5.3)
PREALB SERPL IA-MCNC: 17 MG/DL (ref 15–45)
PROT SERPL-MCNC: 7.3 G/DL (ref 6.8–8.8)
RBC # BLD AUTO: 4.15 10E12/L (ref 3.8–5.2)
SODIUM SERPL-SCNC: 133 MMOL/L (ref 133–144)
TIBC SERPL-MCNC: 294 UG/DL (ref 240–430)
VIT B12 SERPL-MCNC: 710 PG/ML (ref 193–986)
WBC # BLD AUTO: 8 10E9/L (ref 4–11)

## 2019-04-18 PROCEDURE — 84134 ASSAY OF PREALBUMIN: CPT | Performed by: PEDIATRICS

## 2019-04-18 PROCEDURE — 85025 COMPLETE CBC W/AUTO DIFF WBC: CPT | Performed by: PEDIATRICS

## 2019-04-18 PROCEDURE — 84681 ASSAY OF C-PEPTIDE: CPT | Performed by: PEDIATRICS

## 2019-04-18 PROCEDURE — 82607 VITAMIN B-12: CPT | Performed by: PEDIATRICS

## 2019-04-18 PROCEDURE — 36415 COLL VENOUS BLD VENIPUNCTURE: CPT | Performed by: PEDIATRICS

## 2019-04-18 PROCEDURE — 82728 ASSAY OF FERRITIN: CPT | Performed by: PEDIATRICS

## 2019-04-18 PROCEDURE — 84446 ASSAY OF VITAMIN E: CPT | Performed by: PEDIATRICS

## 2019-04-18 PROCEDURE — 80053 COMPREHEN METABOLIC PANEL: CPT | Performed by: PEDIATRICS

## 2019-04-18 PROCEDURE — 83036 HEMOGLOBIN GLYCOSYLATED A1C: CPT | Performed by: PEDIATRICS

## 2019-04-18 PROCEDURE — 83540 ASSAY OF IRON: CPT | Performed by: PEDIATRICS

## 2019-04-18 PROCEDURE — 82306 VITAMIN D 25 HYDROXY: CPT | Performed by: PEDIATRICS

## 2019-04-18 PROCEDURE — 82542 COL CHROMOTOGRAPHY QUAL/QUAN: CPT | Performed by: PEDIATRICS

## 2019-04-18 PROCEDURE — 83550 IRON BINDING TEST: CPT | Performed by: PEDIATRICS

## 2019-04-18 PROCEDURE — G0463 HOSPITAL OUTPT CLINIC VISIT: HCPCS | Mod: ZF

## 2019-04-18 PROCEDURE — 82747 ASSAY OF FOLIC ACID RBC: CPT | Performed by: PEDIATRICS

## 2019-04-18 PROCEDURE — 84590 ASSAY OF VITAMIN A: CPT | Performed by: PEDIATRICS

## 2019-04-18 ASSESSMENT — PAIN SCALES - GENERAL: PAINLEVEL: MILD PAIN (3)

## 2019-04-18 NOTE — PROGRESS NOTES
Chief Complaint   Patient presents with     RECHECK     Follow up for IA     Blood pressure 107/73, pulse 71, weight 61.1 kg (134 lb 11.2 oz), SpO2 98 %, not currently breastfeeding.    Honey Farmer, CMA

## 2019-04-18 NOTE — LETTER
4/18/2019      RE: Chantell Kidd  [address]  [facility] [address]       Chief Complaint   Patient presents with     RECHECK     Follow up for IA     Blood pressure 107/73, pulse 71, weight 61.1 kg (134 lb 11.2 oz), SpO2 98 %, not currently breastfeeding.    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     Post-pancreatectomy diabetes (H)     Type 1 diabetes mellitus with hypoglycemia and without coma (H)     Migraine     Abdominal muscle strain     CIERRA (generalized anxiety disorder)     Major depressive disorder, recurrent episode, moderate (H)     CMC DJD(carpometacarpal degenerative joint disease), localized primary     Exocrine pancreatic insufficiency     Hypothyroidism     Hypoglycemia     Mood disorder due to a general medical condition     Decreased oral intake     Odynophagia     Iron deficiency     Anemia, iron deficiency     Adrenal insufficiency (H)     S/P hernia repair     Nausea     Chondromalacia of left patella       HPI:  Chantell is a 53 year old female here for follow up of total pancreatectomy and islet autotransplant performed on January 6, 2012.  At the time of the procedure, the patient received 420,000 IEQ, or 5,438 IEQ/kg body weight.  She did not have diabetes before the procedure.  Early post-operative course was complicated by RLQ with appendicitis, eventually required appendectomy.    Despite the high islet mass transplanted, Chantell's post-operative course was initially remarkable for high insulin needs.  She in fact does have islet function, as previously documented by mixed meal tests, but she was insulin resistant, requiring high  doses of insulin when on MDI therapy.  She also had frequent day to day variability, and fluctuating patterns with illness and healthier times, as well as a complex regimen, all of which make her an excellent candidate for an insulin pump which she started in Feb 2014.  Extremely concerning was an episode of severe hypoglycemia in November 2013 at which time she was found unconscious.  Suspect at that time she was on too much basal insulin and because she was ill and not eating at the time, dropped far too low overnight.   In early 2014, she was started on an insulin pump with CGM.  Remarkably, her insulin needs have dropped dramatically on an insulin pump.  She was then relatively stable until 2016.  She was again admitted to the hospital on March 15 and March 29, 2016 for hypoglycemia, that appears to be secondary to both exogenous insulin and possible central adrenal insufficiency.   At that time she had the following lab findings:  On 3/29/16, elevated insulin with low C-peptide during BG 42 mg/dL around 5pm, and then again same pattern with BG 50s at 10pm.  Chantell is pretty clear that she did not take insuiln that day on 3/29/16. She also had three cortisol levels-- including one during hypoglycemia, one at around 4am (possibly also during hypoglycemia) and one 8am draw that were all low-- all 0.6- 1.6 range.    Of note, she did have a kenalog injection one week earlier on 3/24/16, just a few days prior to that draw and was also receiving narcotics in hospital (but not at home).  She has since had another severe hypogylcemic episode in Jan 2017 -- did not lose consciousness but was disoriented and unable to get help for herself at the store.  And again was admitted for severe hypoglycemia at [facility] on 11/29/2017 (refer to 12/13/17 note) with labs consistent with exogenous insulin overdose although she denied taking any excess insulin (12/1 C-peptide <0.1 and insulin .64.7, 11/30 C-peptide 0.5 and insulin  91). Additional hypoglycemia admissions: 8/26/18, 9/3/18, with high insulin and low C-peptide c/w hypoglycemia secondary to exogenous insulin (9/3/18 at 6:41pm about 20 hours after last reported insulin dose; insulin 45.6 mU/L, C-peptide 0.2 ng/mL).    Picture is also complicated by non-diabetes history which includes the following :  - 7/6/2016 EGD identified diffuse candidiasis through entire esophagous.  Pt has also had recurrent oral thrush in 2016  - Recurrent chronic abdominal pain  - Recurrent vomiting of unclear etiology but G-T placed 10/2016 and converted to GJ 11/2016 with symptomatic improvement  Unclear if she has any gastroparesis.  Suboptimal study in March 2016 showed 100% retention at 1 hour and then rapid emptying.    - Hernia repair performed by general surgery 9/15/16 (TPIAT team not involved).    - Chronic abdominal pain      New history today:  1)  Diabetes:   Had to stop old insulin pump about 3-4 weeks ago>  Chantell had issues with her prior pump, which was out of warranty, not delivering insulin. This resulted in BGs as high as 400-500 mg/dL.  For this reason, we asked her to change to a MDI regimen. She now also has a 670g pump and sensor and will have pump training later today at the [facility].   I reviewed her hand written logs for the MDI regimen and while these are not yet at goal, they are much better than previously, now running generally 120-240 mg/dL range.  Historically she has needed less insulin by pump than by MDI.    MDI doses:  - Tresiba 12 unit /day  - Novolog 0.5 unit per 50 >150 mg/dL and 1 unit per 23 grams.    2)  Adrenal insufficiency:  Still unclear if this was transient or true central AI but we have been treating her regardless due to SHE history.  SWe have maintained her on 20 mg/day (10 BID) of cortef, with Body surface area is 1.63 meters squared.  She is compliant with this.  She is no longer having hypoglycemia so my long term goal is to wean and retest  her.      3)  Other; these chronic issues noted in past but not reviewed today:   - noted she has fosamax on her list with a Rx date of 2016. She says she does still take this once weekly.  She does get some pills 'stuck' in her esophagus but reports she notices this more with the KCl and not as much with the this pill  - has some significant psychosocial stress as noted above.  Not currently working with psychology despite our past conversations regarding this.  Say she is still willing.  Distance is an issue for her so recommended she be seen at a private psychology clinic in [place] that would be closer.    She did note she is eating better and healthier.      Review of systems:  Review Of Systems  Skin: negative  Eyes: negative  Ears/Nose/Throat: negative  Respiratory: No shortness of breath, dyspnea on exertion, cough, or hemoptysis  Cardiovascular: negative  Gastrointestinal: chronic abdominal pain + hernia  Genitourinary: negative  Musculoskeletal: negative  Neurologic: negative  Psychiatric: negative  Hematologic/Lymphatic/Immunologic: negative  Endocrine: as above    Past Medical and Surgical History:  Past Medical History:   Diagnosis Date     Chronic abdominal pain      Chronic pancreatitis (H)     S/P pancreatectomy     Depression with anxiety      Diabetes mellitus (H) 1/2012     Gastro-oesophageal reflux disease      Hypothyroidism 4/23/2015     Kidney stones      Low serum cortisol level (H)      Migraines      Other chronic pain     STOMACH     Other chronic pain     LUMBAR SPINE     Spasm of sphincter of Oddi      Past Surgical History:   Procedure Laterality Date     ARTHROPLASTY CARPOMETACARPAL (THUMB JOINT)  5/2/2014    Procedure: ARTHROPLASTY CARPOMETACARPAL (THUMB JOINT);  Surgeon: Carina Panda MD;  Location: [facility] OR     CHOLECYSTECTOMY  2004     COLONOSCOPY  7/18/2014    Procedure: COLONOSCOPY;  Surgeon: Aurora Sahu MD;  Location:  GI     COLONOSCOPY N/A 8/1/2017     Procedure: COLONOSCOPY;  Colonoscopy and upper endoscopy;  Surgeon: Deirdre Harris MD;  Location: [facility]     ENDOSCOPIC RETROGRADE CHOLANGIOPANCREATOGRAM       ENDOSCOPIC RETROGRADE CHOLANGIOPANCREATOGRAM  4/19/2011    Procedure:ENDOSCOPIC RETROGRADE CHOLANGIOPANCREATOGRAM; Pancreatic Stent Placement       ENDOSCOPIC RETROGRADE CHOLANGIOPANCREATOGRAM  5/26/2011    Procedure:ENDOSCOPIC RETROGRADE CHOLANGIOPANCREATOGRAM; with Pancreatic Stent Removal; Surgeon:DALE MIMS; Location:[facility]     ENDOSCOPY UPPER, COLONOSCOPY, COMBINED  4/25/2012    Procedure:COMBINED ENDOSCOPY UPPER, COLONOSCOPY; Enteroscopy with Bile Duct Stent Removal, Colonoscopy  *Latex Safe Room*; Surgeon:GRACY GODWINIQ; Location[facility]     ESOPHAGOSCOPY, GASTROSCOPY, DUODENOSCOPY (EGD), COMBINED  5/26/2011    Procedure:COMBINED ESOPHAGOSCOPY, GASTROSCOPY, DUODENOSCOPY (EGD); Surgeon:DALE MIMS; [facility]     ESOPHAGOSCOPY, GASTROSCOPY, DUODENOSCOPY (EGD), COMBINED N/A 10/30/2014    Procedure: COMBINED ESOPHAGOSCOPY, GASTROSCOPY, DUODENOSCOPY (EGD), BIOPSY SINGLE OR MULTIPLE;  Surgeon: Sarai Moon MD;  [facility]: [facility]     ESOPHAGOSCOPY, GASTROSCOPY, DUODENOSCOPY (EGD), COMBINED Left 7/6/2015    Procedure: COMBINED ESOPHAGOSCOPY, GASTROSCOPY, DUODENOSCOPY (EGD), BIOPSY SINGLE OR MULTIPLE;  Surgeon: Thomas Estrada MD;  [facility]: [facility]     ESOPHAGOSCOPY, GASTROSCOPY, DUODENOSCOPY (EGD), COMBINED N/A 7/8/2016    Procedure: COMBINED ESOPHAGOSCOPY, GASTROSCOPY, DUODENOSCOPY (EGD), BIOPSY SINGLE OR MULTIPLE;  Surgeon: Eloy Klein MD;  [facility]     ESOPHAGOSCOPY, GASTROSCOPY, DUODENOSCOPY (EGD), COMBINED N/A 8/4/2016    Procedure: COMBINED ESOPHAGOSCOPY, GASTROSCOPY, DUODENOSCOPY (EGD), BIOPSY SINGLE OR MULTIPLE;  Surgeon: Jason Brown MD;  [facility]     ESOPHAGOSCOPY, GASTROSCOPY, DUODENOSCOPY (EGD), COMBINED N/A 8/1/2017    Procedure: COMBINED  ESOPHAGOSCOPY, GASTROSCOPY, DUODENOSCOPY (EGD);;  Surgeon: Deirdre Harris MD;  Location: [facility]     GYN SURGERY      Hysterectomy and USO     [facility] ENDOSCOPY W EUS  7/20/2011    Procedure:COMBINED ENDOSCOPIC ULTRASOUND, ESOPHAGOSCOPY, GASTROSCOPY, DUODENOSCOPY (EGD); Surgeon:DARVIN DONOHUE; Location[facility]     HERNIORRHAPHY VENTRAL N/A 9/15/2016    Procedure: HERNIORRHAPHY VENTRAL;  Surgeon: Juanita Bernabe MD;  [facility]     HYSTERECTOMY  1997 or 1998    USO     INCISION AND DRAINAGE ABDOMEN WASHOUT, COMBINED  8/16/2012    Procedure: COMBINED INCISION AND DRAINAGE ABDOMEN WASHOUT;  ,debridement and Drainage Post Appendectomy;  Surgeon: Ron Austin MD;  [facility]     INJECT TRANSVERSUS ABDOMINIS PLANE (TAP) BLOCK BILATERAL Bilateral 5/26/2016    Procedure: INJECT TRANSVERSUS ABDOMINIS PLANE (TAP) BLOCK BILATERAL;  Surgeon: Leonard Mccallum MD;  [facility]     LAPAROSCOPIC APPENDECTOMY  7/30/2012    Procedure: LAPAROSCOPIC APPENDECTOMY;  Open Appendectomy;  Surgeon: Ron Autsin MD;  [facility]     PANCREATECTOMY, TRANSPLANT AUTO ISLET CELL, COMBINED  1/6/2012    Procedure:COMBINED PANCREATECTOMY, TRANSPLANT AUTO ISLET CELL; Total  Pancreatectomy, Auto Islet Transplant, splenectomy, 18fr. transgastric-jejunal feeding tube placement, liver biopsy; Surgeon:PALAK LEE; [facility]     REPLACE GASTROSTOMY TUBE, PERCUTANEOUS N/A 8/30/2017    Procedure: REPLACE GASTROSTOMY TUBE, PERCUTANEOUS;  GJ Tube Change;  Surgeon: Jose Nath PA-C;  [facility]     SPLENECTOMY         Family History:  New changes since last visit:  none  Family History   Problem Relation Age of Onset     Hypertension Mother      Diabetes Mother      Osteoporosis Mother      Cancer Father         pancreatic cancer     Diabetes Maternal Grandmother      Cardiovascular Maternal Grandmother      Cancer Maternal Grandfather         lung cancer     Cancer Sister          brain     Cancer Sister         liver cancer       Social History:  Social History     Social History Narrative     Not on file      Lives in [place] with her . Her daughter has recent history of addiction that has created family stress (2018).      Physical Exam:  Vitals: /73   Pulse 71   Wt 61.1 kg (134 lb 11.2 oz)   SpO2 98%   BMI 24.64 kg/m     BMI= Body mass index is 24.64 kg/m .     General:  Well-appearing, NAD  Head: NC/AT  Eyes: sclera white  Neuro:  Normal mental status, normal gross motor  Psych:  Communicative, with normal affect     Results:    Lab Results   Component Value Date    A1C 6.6 08/27/2018    A1C 7.5 02/01/2018    A1C 6.7 12/13/2017    A1C 6.4 10/19/2017    A1C 5.9 01/19/2017       C-Wurcdzc89/1/2017  [facility]   Component Name Value Ref Range   C-PEPTIDE  <0.1 (L)   Comment:    Test Performed by:  [facility] [place]  [address] [place] [address] 1.1 - 4.4 ng/mL    Specimen   Blood     Simultaneous glucose 12/1/17 = 81 mg/dL      Assessment:  1. 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  Treated for central adrenal insufficiency.        Chantell is a 52 year old with history of chronic pancreatitis who is s/p total pancreatectomy and islet autotransplant, with insulin dependence (pump therapy) complicated by insulin resistance, and several episodes of severe hypoglycemia and seizures (E10.641).  She is treated with a continuous subcutaneous insulin infusion pump.  We had to stop this temporarily due to a pump failure.  Now w 670g, waiting to be trained to start.    Chantell has post-pancreatectomy diabetes-- essentially a surgical form of type 1 diabetes (E10.641).  She requires an insulin pump for management due to her multiple episodes of hypoglycemia and hypoglycemia unawareness, including episodes of seizures that required suspension of insulin pump and  hospital admits. She also should have a continuous glucose monitor for frequent monitoring because of her severe hypoglycemia history.  Chantell continues to measure her BG 4-6 times every day, which we documented during the clinic encounter today from review of her logs.    Chantell requires frequent insulin adjustments to her insulin regimen based on her blood glucoses to control hyperglycemia and hypoglycemia.     At today's visit, I do feel Chantell is ready to get started back on the insulin pump, with new 670G.  She will need training today.  I do want her to wear the sensor.  Given past hypoglycemia episodes, she should have suspended before low turned on.  She will be a good candidate for auto mode after one to a few weeks on manual mode, if she can be diligent about doing calibration BGs and entering carbs.  She has partial islet graft function so her body is actually somewhat forgiving for missed or under dosing of carb bolus, so she may do really well actually at staying in the auto mode on the 670g.     I do want to slowly try weaning cortef.     Plan:  1.  Changes to current diabetes regimen:  Patient Instructions   1)  Cortef:  Reduce dose to 10 mg in the AM and 5 mg in the PM.    2)  Send urine ketone strips;  Test if BG >350 mg/dL, and call if ketones are more than trace (if negative or trace, that is OK).    3)  Pump training session today.  For settings:  Basal rate 12am to 12am 0.5 units per hour.  Bolus:  I:C ratio 25 grams   mg/dL   Target  mg/dL   Active insulin time = 3 hours    4)  Make sure to get your sensor calibration checks in every day!       5)  Make sure to upload to [employer] after a week on the pump and sensor and just send me an email when you have done this.        Follow Up:  August 1 at 10:40am      2.  Frequency of blood sugar checks:  Premeal, bedtime, and additional measurements PRN-- Should have strips sufficient to test 8 times per day given her labiltiy history.    3.   Continue routine follow up for autoislet transplant patients:  Mixed meal test (6 mL/kg BoostHP to max of 360 mL) at 3 months, 6 months, and once a year post transplant.  Hemoglobin A1c levels at these time points and quarterly.    4.  Other issues addressed today:  As above        Follow up: as above    Contact me for questions at [phone] or [phone].  Emergency number to reach pediatric endocrinology after hours is [phone].        Amena Maher MD  , Pediatric Endocrinology and Diabetes  [facility] [place]  [facility]      VISIT TIME:  25 minutes of face to face time, >50% spent in counseling and coordination of care on insulin plan.    Amena Maher MD

## 2019-04-18 NOTE — PROGRESS NOTES
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     Post-pancreatectomy diabetes (H)     Type 1 diabetes mellitus with hypoglycemia and without coma (H)     Migraine     Abdominal muscle strain     CIERRA (generalized anxiety disorder)     Major depressive disorder, recurrent episode, moderate (H)     CMC DJD(carpometacarpal degenerative joint disease), localized primary     Exocrine pancreatic insufficiency     Hypothyroidism     Hypoglycemia     Mood disorder due to a general medical condition     Decreased oral intake     Odynophagia     Iron deficiency     Anemia, iron deficiency     Adrenal insufficiency (H)     S/P hernia repair     Nausea     Chondromalacia of left patella       HPI:  Chantell is a 53 year old female here for follow up of total pancreatectomy and islet autotransplant performed on January 6, 2012.  At the time of the procedure, the patient received 420,000 IEQ, or 5,438 IEQ/kg body weight.  She did not have diabetes before the procedure.  Early post-operative course was complicated by RLQ with appendicitis, eventually required appendectomy.    Despite the high islet mass transplanted, Chantell's post-operative course was initially remarkable for high insulin needs.  She in fact does have islet function, as previously documented by mixed meal tests, but she was insulin resistant, requiring high doses of insulin when on MDI therapy.  She also had frequent day to day variability, and fluctuating patterns with illness and healthier times, as well as a complex regimen, all of which make her an excellent candidate for an insulin pump which she started in Feb 2014.  Extremely concerning  was an episode of severe hypoglycemia in November 2013 at which time she was found unconscious.  Suspect at that time she was on too much basal insulin and because she was ill and not eating at the time, dropped far too low overnight.   In early 2014, she was started on an insulin pump with CGM.  Remarkably, her insulin needs have dropped dramatically on an insulin pump.  She was then relatively stable until 2016.  She was again admitted to the hospital on March 15 and March 29, 2016 for hypoglycemia, that appears to be secondary to both exogenous insulin and possible central adrenal insufficiency.   At that time she had the following lab findings:  On 3/29/16, elevated insulin with low C-peptide during BG 42 mg/dL around 5pm, and then again same pattern with BG 50s at 10pm.  Chantell is pretty clear that she did not take insuiln that day on 3/29/16. She also had three cortisol levels-- including one during hypoglycemia, one at around 4am (possibly also during hypoglycemia) and one 8am draw that were all low-- all 0.6- 1.6 range.    Of note, she did have a kenalog injection one week earlier on 3/24/16, just a few days prior to that draw and was also receiving narcotics in hospital (but not at home).  She has since had another severe hypogylcemic episode in Jan 2017 -- did not lose consciousness but was disoriented and unable to get help for herself at the store.  And again was admitted for severe hypoglycemia at [facility] on 11/29/2017 (refer to 12/13/17 note) with labs consistent with exogenous insulin overdose although she denied taking any excess insulin (12/1 C-peptide <0.1 and insulin .64.7, 11/30 C-peptide 0.5 and insulin 91). Additional hypoglycemia admissions: 8/26/18, 9/3/18, with high insulin and low C-peptide c/w hypoglycemia secondary to exogenous insulin (9/3/18 at 6:41pm about 20 hours after last reported insulin dose; insulin 45.6 mU/L, C-peptide 0.2 ng/mL).    Picture is also complicated by  non-diabetes history which includes the following :  - 7/6/2016 EGD identified diffuse candidiasis through entire esophagous.  Pt has also had recurrent oral thrush in 2016  - Recurrent chronic abdominal pain  - Recurrent vomiting of unclear etiology but G-T placed 10/2016 and converted to GJ 11/2016 with symptomatic improvement  Unclear if she has any gastroparesis.  Suboptimal study in March 2016 showed 100% retention at 1 hour and then rapid emptying.    - Hernia repair performed by general surgery 9/15/16 (TPIAT team not involved).    - Chronic abdominal pain      New history today:  1)  Diabetes:   Had to stop old insulin pump about 3-4 weeks ago>  Chantell had issues with her prior pump, which was out of warranty, not delivering insulin. This resulted in BGs as high as 400-500 mg/dL.  For this reason, we asked her to change to a MDI regimen. She now also has a 670g pump and sensor and will have pump training later today at the [facility].   I reviewed her hand written logs for the MDI regimen and while these are not yet at goal, they are much better than previously, now running generally 120-240 mg/dL range.  Because of glucose lability,  She tests 4 -6 times each day.  She has been hand-writing these because her pump failed, and I reviewed her past 4 weeks of written logs in clinic, with testing verified at least 4 times every day..  Historically she has needed less insulin by pump than by MDI.      MDI doses:  - Tresiba 12 unit /day  - Novolog 0.5 unit per 50 >150 mg/dL and 1 unit per 23 grams.  (needs to use this correction scale 4 times per day - must test BG each time)    2)  Adrenal insufficiency:  Still unclear if this was transient or true central AI but we have been treating her regardless due to SHE history.  SWe have maintained her on 20 mg/day (10 BID) of cortef, with Body surface area is 1.63 meters squared.  She is compliant with this.  She is no longer having hypoglycemia so my long term  goal is to wean and retest her.      3)  Other; these chronic issues noted in past but not reviewed today:   - noted she has fosamax on her list with a Rx date of 2016. She says she does still take this once weekly.  She does get some pills 'stuck' in her esophagus but reports she notices this more with the KCl and not as much with the this pill  - has some significant psychosocial stress as noted above.  Not currently working with psychology despite our past conversations regarding this.  Say she is still willing.  Distance is an issue for her so recommended she be seen at a private psychology clinic in [place] that would be closer.    She did note she is eating better and healthier.      Review of systems:  Review Of Systems  Skin: negative  Eyes: negative  Ears/Nose/Throat: negative  Respiratory: No shortness of breath, dyspnea on exertion, cough, or hemoptysis  Cardiovascular: negative  Gastrointestinal: chronic abdominal pain + hernia  Genitourinary: negative  Musculoskeletal: negative  Neurologic: negative  Psychiatric: negative  Hematologic/Lymphatic/Immunologic: negative  Endocrine: as above    Past Medical and Surgical History:  Past Medical History:   Diagnosis Date     Chronic abdominal pain      Chronic pancreatitis (H)     S/P pancreatectomy     Depression with anxiety      Diabetes mellitus (H) 1/2012     Gastro-oesophageal reflux disease      Hypothyroidism 4/23/2015     Kidney stones      Low serum cortisol level (H)      Migraines      Other chronic pain     STOMACH     Other chronic pain     LUMBAR SPINE     Spasm of sphincter of Oddi      Past Surgical History:   Procedure Laterality Date     ARTHROPLASTY CARPOMETACARPAL (THUMB JOINT)  5/2/2014    Procedure: ARTHROPLASTY CARPOMETACARPAL (THUMB JOINT);  Surgeon: Carina Panda MD;  Location: [facility] OR     CHOLECYSTECTOMY  2004     COLONOSCOPY  7/18/2014    Procedure: COLONOSCOPY;  Surgeon: Aurora Sahu MD;  Location:  GI      COLONOSCOPY N/A 8/1/2017    Procedure: COLONOSCOPY;  Colonoscopy and upper endoscopy;  Surgeon: Deirdre Harris MD;  Location: [facility]     ENDOSCOPIC RETROGRADE CHOLANGIOPANCREATOGRAM       ENDOSCOPIC RETROGRADE CHOLANGIOPANCREATOGRAM  4/19/2011    Procedure:ENDOSCOPIC RETROGRADE CHOLANGIOPANCREATOGRAM; Pancreatic Stent Placement       ENDOSCOPIC RETROGRADE CHOLANGIOPANCREATOGRAM  5/26/2011    Procedure:ENDOSCOPIC RETROGRADE CHOLANGIOPANCREATOGRAM; with Pancreatic Stent Removal; Surgeon:DALE MIMS; Location:[facility] OR     ENDOSCOPY UPPER, COLONOSCOPY, COMBINED  4/25/2012    Procedure:COMBINED ENDOSCOPY UPPER, COLONOSCOPY; Enteroscopy with Bile Duct Stent Removal, Colonoscopy  *[facility] Safe Room*; Surgeon:GRACY GODWINIQ; Location[facility]     ESOPHAGOSCOPY, GASTROSCOPY, DUODENOSCOPY (EGD), COMBINED  5/26/2011    Procedure:COMBINED ESOPHAGOSCOPY, GASTROSCOPY, DUODENOSCOPY (EGD); Surgeon:DALE MIMS; [facility]     ESOPHAGOSCOPY, GASTROSCOPY, DUODENOSCOPY (EGD), COMBINED N/A 10/30/2014    Procedure: COMBINED ESOPHAGOSCOPY, GASTROSCOPY, DUODENOSCOPY (EGD), BIOPSY SINGLE OR MULTIPLE;  Surgeon: Sarai Moon MD;  Location: [facility]     ESOPHAGOSCOPY, GASTROSCOPY, DUODENOSCOPY (EGD), COMBINED Left 7/6/2015    Procedure: COMBINED ESOPHAGOSCOPY, GASTROSCOPY, DUODENOSCOPY (EGD), BIOPSY SINGLE OR MULTIPLE;  Surgeon: Thomas Estrada MD;  Location: [facility]     ESOPHAGOSCOPY, GASTROSCOPY, DUODENOSCOPY (EGD), COMBINED N/A 7/8/2016    Procedure: COMBINED ESOPHAGOSCOPY, GASTROSCOPY, DUODENOSCOPY (EGD), BIOPSY SINGLE OR MULTIPLE;  Surgeon: Eloy Klein MD;  [facility]: [facility]     ESOPHAGOSCOPY, GASTROSCOPY, DUODENOSCOPY (EGD), COMBINED N/A 8/4/2016    Procedure: COMBINED ESOPHAGOSCOPY, GASTROSCOPY, DUODENOSCOPY (EGD), BIOPSY SINGLE OR MULTIPLE;  Surgeon: Jason rBown MD;  [facility]: [facility]     ESOPHAGOSCOPY, GASTROSCOPY, DUODENOSCOPY (EGD), COMBINED N/A 8/1/2017     Procedure: COMBINED ESOPHAGOSCOPY, GASTROSCOPY, DUODENOSCOPY (EGD);;  Surgeon: Deirdre Harris MD;  Location: [facility]     GYN SURGERY      Hysterectomy and USO     [facility] ENDOSCOPY W EUS  7/20/2011    Procedure:COMBINED ENDOSCOPIC ULTRASOUND, ESOPHAGOSCOPY, GASTROSCOPY, DUODENOSCOPY (EGD); Surgeon:DARVIN DONOHUE; Location[facility]     HERNIORRHAPHY VENTRAL N/A 9/15/2016    Procedure: HERNIORRHAPHY VENTRAL;  Surgeon: Juanita Bernabe MD;  [facility]     HYSTERECTOMY  1997 or 1998    USO     INCISION AND DRAINAGE ABDOMEN WASHOUT, COMBINED  8/16/2012    Procedure: COMBINED INCISION AND DRAINAGE ABDOMEN WASHOUT;  ,debridement and Drainage Post Appendectomy;  Surgeon: Ron Austin MD;  [facility]     INJECT TRANSVERSUS ABDOMINIS PLANE (TAP) BLOCK BILATERAL Bilateral 5/26/2016    Procedure: INJECT TRANSVERSUS ABDOMINIS PLANE (TAP) BLOCK BILATERAL;  Surgeon: Leonard Mccallum MD;  [facility]     LAPAROSCOPIC APPENDECTOMY  7/30/2012    Procedure: LAPAROSCOPIC APPENDECTOMY;  Open Appendectomy;  Surgeon: Ron Austin MD;  [facility]     PANCREATECTOMY, TRANSPLANT AUTO ISLET CELL, COMBINED  1/6/2012    Procedure:COMBINED PANCREATECTOMY, TRANSPLANT AUTO ISLET CELL; Total  Pancreatectomy, Auto Islet Transplant, splenectomy, 18fr. transgastric-jejunal feeding tube placement, liver biopsy; Surgeon:PALAK LEE; [facility]     REPLACE GASTROSTOMY TUBE, PERCUTANEOUS N/A 8/30/2017    Procedure: REPLACE GASTROSTOMY TUBE, PERCUTANEOUS;  GJ Tube Change;  Surgeon: Jose Nath PA-C;  [facility]     SPLENECTOMY         Family History:  New changes since last visit:  none  Family History   Problem Relation Age of Onset     Hypertension Mother      Diabetes Mother      Osteoporosis Mother      Cancer Father         pancreatic cancer     Diabetes Maternal Grandmother      Cardiovascular Maternal Grandmother      Cancer Maternal Grandfather         lung  cancer     Cancer Sister         brain     Cancer Sister         liver cancer       Social History:  Social History     Social History Narrative     Not on file      Lives in [place] with her . Her daughter has recent history of addiction that has created family stress (2018).      Physical Exam:  Vitals: /73   Pulse 71   Wt 61.1 kg (134 lb 11.2 oz)   SpO2 98%   BMI 24.64 kg/m    BMI= Body mass index is 24.64 kg/m .     General:  Well-appearing, NAD  Head: NC/AT  Eyes: sclera white  Neuro:  Normal mental status, normal gross motor  Psych:  Communicative, with normal affect     Results:    Lab Results   Component Value Date    A1C 6.6 08/27/2018    A1C 7.5 02/01/2018    A1C 6.7 12/13/2017    A1C 6.4 10/19/2017    A1C 5.9 01/19/2017       [id number]/1/2017  [facility]   Component Name Value Ref Range   C-PEPTIDE  <0.1 (L)   Comment:    Test Performed by:  [facility]  [address] [place] [address] 1.1 - 4.4 ng/mL    Specimen   Blood     Simultaneous glucose 12/1/17 = 81 mg/dL      Assessment:  1. 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  Treated for central adrenal insufficiency.        Chantell is a 52 year old with history of chronic pancreatitis who is s/p total pancreatectomy and islet autotransplant, with insulin dependence (pump therapy) complicated by insulin resistance, and several episodes of severe hypoglycemia and seizures (E10.641).  She is treated with a continuous subcutaneous insulin infusion pump.  We had to stop this temporarily due to a pump failure.  Now w 670g, waiting to be trained to start.    Chantell has post-pancreatectomy diabetes-- essentially a surgical form of type 1 diabetes (E10.641).  She requires an insulin pump for management due to her multiple episodes of hypoglycemia and hypoglycemia unawareness, including episodes of seizures that required suspension  of insulin pump and hospital admits. She also should have a continuous glucose monitor for frequent monitoring because of her severe hypoglycemia history.  Chantell continues to measure her BG 4-6 times every day, which we documented during the clinic encounter today from review of her logs.    Chantell requires frequent insulin adjustments to her insulin regimen based on her blood glucoses to control hyperglycemia and hypoglycemia. She also must test BG by meter 4 or more times per day-- it is critical for her to have access to testing supplies and sensor given her past history of hospital admissions for hypoglycemia.    At today's visit, I do feel Chantell is ready to get started back on the insulin pump, with new 670G.  She will need training today.  I do want her to wear the sensor.  Given past hypoglycemia episodes, she should have suspended before low turned on.  She will be a good candidate for auto mode after one to a few weeks on manual mode, if she can be diligent about doing calibration BGs and entering carbs.  She has partial islet graft function so her body is actually somewhat forgiving for missed or under dosing of carb bolus, so she may do really well actually at staying in the auto mode on the 670g.     I do want to slowly try weaning cortef.     Plan:  1.  Changes to current diabetes regimen:  Patient Instructions   1)  Cortef:  Reduce dose to 10 mg in the AM and 5 mg in the PM.    2)  Send urine ketone strips;  Test if BG >350 mg/dL, and call if ketones are more than trace (if negative or trace, that is OK).    3)  Pump training session today.  For settings:  Basal rate 12am to 12am 0.5 units per hour.  Bolus:  I:C ratio 25 grams   mg/dL   Target  mg/dL   Active insulin time = 3 hours    4)  Make sure to get your sensor calibration checks in every day!       5)  Make sure to upload to [employer] after a week on the pump and sensor and just send me an email when you have done this.        Follow Up:   August 1 at 10:40am      2.  Frequency of blood sugar checks:  Premeal, bedtime, and additional measurements PRN-- Should have strips sufficient to test 8 times per day given her labiltiy history.    3.  Continue routine follow up for autoislet transplant patients:  Mixed meal test (6 mL/kg BoostHP to max of 360 mL) at 3 months, 6 months, and once a year post transplant.  Hemoglobin A1c levels at these time points and quarterly.    4.  Other issues addressed today:  As above        Follow up: as above    Contact me for questions at [phone] or [phone].  Emergency number to reach pediatric endocrinology after hours is [phone].        Amena Maher MD  , Pediatric Endocrinology and Diabetes  [facility] [place] [facility]  [facility]      VISIT TIME:  25 minutes of face to face time, >50% spent in counseling and coordination of care on insulin plan.

## 2019-04-18 NOTE — PATIENT INSTRUCTIONS
1)  Cortef:  Reduce dose to 10 mg in the AM and 5 mg in the PM.    2)  Send urine ketone strips;  Test if BG >350 mg/dL, and call if ketones are more than trace (if negative or trace, that is OK).    3)  Pump training session today.  For settings:  Basal rate 12am to 12am 0.5 units per hour.  Bolus:  I:C ratio 25 grams   mg/dL   Target  mg/dL   Active insulin time = 3 hours    4)  Make sure to get your sensor calibration checks in every day!       5)  Make sure to upload to [employer] after a week on the pump and sensor and just send me an email when you have done this.        Follow Up:  August 1 at 10:40am

## 2019-04-19 LAB
FOLATE RBC-MCNC: 322 NG/ML
HCT VFR BLD CALC: ABNORMAL %

## 2019-04-21 LAB
A-TOCOPHEROL VIT E SERPL-MCNC: 6.5 MG/L (ref 5.5–18)
ANNOTATION COMMENT IMP: ABNORMAL
BETA+GAMMA TOCOPHEROL SERPL-MCNC: 0.7 MG/L (ref 0–6)
DEPRECATED CALCIDIOL+CALCIFEROL SERPL-MC: <38 UG/L (ref 20–75)
RETINYL PALMITATE SERPL-MCNC: <0.02 MG/L (ref 0–0.1)
VIT A SERPL-MCNC: 0.24 MG/L (ref 0.3–1.2)
VITAMIN D2 SERPL-MCNC: <5 UG/L
VITAMIN D3 SERPL-MCNC: 33 UG/L

## 2019-04-23 LAB
A-LINOLENATE SERPL-SCNC: 53 NMOL/ML (ref 50–130)
AA SERPL-SCNC: 516 NMOL/ML (ref 520–1490)
ARACHIDATE SERPL-SCNC: 25 NMOL/ML (ref 50–90)
CLINICAL BIOCHEMIST REVIEW: ABNORMAL
DHA SERPL-SCNC: 125 NMOL/ML (ref 30–250)
DOCOSAPENTAENATE W6 SERPL-SCNC: 23 NMOL/ML (ref 10–70)
DOCOSATETRAENOATE SERPL-SCNC: 28 NMOL/ML (ref 10–80)
DOCOSENOATE SERPL-SCNC: 4 NMOL/ML (ref 4–13)
DPA SERPL-SCNC: 46 NMOL/ML (ref 20–210)
EPA SERPL-SCNC: 51 NMOL/ML (ref 14–100)
FA SERPL-SCNC: 9.1 MMOL/L (ref 7.3–16.8)
G-LINOLENATE SERPL-SCNC: 56 NMOL/ML (ref 16–150)
HEXADECENOATE SERPL-SCNC: 48 NMOL/ML (ref 25–105)
HOMO-G LINOLENATE SERPL-SCNC: 184 NMOL/ML (ref 50–250)
LAURATE SERPL-SCNC: 27 NMOL/ML (ref 6–90)
LINOLEATE SERPL-SCNC: 2052 NMOL/ML (ref 2270–3850)
MEAD ACID SERPL-SCNC: 32 NMOL/ML (ref 7–30)
MONOUNSAT FA SERPL-SCNC: 2.4 MMOL/L (ref 1.3–5.8)
MYRISTATE SERPL-SCNC: 178 NMOL/ML (ref 30–450)
NERVONATE SERPL-SCNC: 89 NMOL/ML (ref 60–100)
OCTADECANOATE SERPL-SCNC: 732 NMOL/ML (ref 590–1170)
OLEATE SERPL-SCNC: 1549 NMOL/ML (ref 650–3500)
PALMITATE SERPL-SCNC: 2473 NMOL/ML (ref 1480–3730)
PALMITOLEATE SERPL-SCNC: 515 NMOL/ML (ref 110–1130)
POLYUNSAT FA SERPL-SCNC: 3.2 MMOL/L (ref 3.2–5.8)
SAT FA SERPL-SCNC: 3.5 MMOL/L (ref 2.5–5.5)
TRIENOATE/AA SERPL-SRTO: 0.06 {RATIO} (ref 0.01–0.04)
VACCENATE SERPL-SCNC: 152 NMOL/ML (ref 280–740)
W3 FA SERPL-SCNC: 0.3 MMOL/L (ref 0.2–0.5)
W6 FA SERPL-SCNC: 2.9 MMOL/L (ref 3–5.4)

## 2019-06-05 DIAGNOSIS — R53.81 PHYSICAL DECONDITIONING: ICD-10-CM

## 2019-06-05 DIAGNOSIS — G89.29 CHRONIC BACK PAIN, UNSPECIFIED BACK LOCATION, UNSPECIFIED BACK PAIN LATERALITY: ICD-10-CM

## 2019-06-05 DIAGNOSIS — K59.00 CONSTIPATION, UNSPECIFIED CONSTIPATION TYPE: ICD-10-CM

## 2019-06-05 DIAGNOSIS — M54.9 CHRONIC BACK PAIN, UNSPECIFIED BACK LOCATION, UNSPECIFIED BACK PAIN LATERALITY: ICD-10-CM

## 2019-06-05 DIAGNOSIS — F51.01 PRIMARY INSOMNIA: ICD-10-CM

## 2019-06-06 RX ORDER — TRAZODONE HYDROCHLORIDE 100 MG/1
TABLET ORAL
Qty: 100 TABLET | Refills: 1 | Status: SHIPPED | OUTPATIENT
Start: 2019-06-06 | End: 2020-02-03

## 2019-06-06 NOTE — TELEPHONE ENCOUNTER
traZODone (DESYREL) 100 MG tablet   Last Written Prescription Date:  10/19/18  Last Fill Quantity: 100,   # refills: 1  Last Office Visit :10/4/18  Future Office visit:  none

## 2019-06-09 ENCOUNTER — NURSE TRIAGE (OUTPATIENT)
Dept: NURSING | Facility: CLINIC | Age: 54
End: 2019-06-09

## 2019-06-09 NOTE — TELEPHONE ENCOUNTER
"\"I have blood in my stool and I have thrown up blood.\"  \"1/2 a dozen stools\" with blood mixed in stool. Reporting 6 episodes of vomiting  \"chunks of blood\" in past 2 days.  \"Maroon.\" Dizzy and light headaed.\"  Mid abdominal pain. Afebrile.     Per guidelines advised to call 911. Caller verbalized understanding. Denies further questions.    Patient plans to have spouse drive her to the Emergency Room.     Meaghan Calvert RN  [facility]         Reason for Disposition    [1] Vomited blood AND [2] large amount (example: \"a cup of blood\")    Additional Information    Negative: Shock suspected (e.g., cold/pale/clammy skin, too weak to stand, low BP, rapid pulse)    Negative: Difficult to awaken or acting confused (e.g., disoriented, slurred speech)    Negative: Unable to walk, or can only walk with assistance (e.g., requires support)    Negative: Fainted    Protocols used: VOMITING BLOOD-A-AH      "

## 2019-06-10 ENCOUNTER — APPOINTMENT (OUTPATIENT)
Dept: CT IMAGING | Facility: CLINIC | Age: 54
DRG: 378 | End: 2019-06-10
Attending: EMERGENCY MEDICINE
Payer: MEDICARE

## 2019-06-10 ENCOUNTER — HOSPITAL ENCOUNTER (INPATIENT)
Facility: CLINIC | Age: 54
LOS: 4 days | Discharge: HOME | DRG: 378 | End: 2019-06-14
Attending: EMERGENCY MEDICINE | Admitting: INTERNAL MEDICINE
Payer: MEDICARE

## 2019-06-10 DIAGNOSIS — R11.0 NAUSEA: ICD-10-CM

## 2019-06-10 DIAGNOSIS — E27.40 ADRENAL INSUFFICIENCY (H): ICD-10-CM

## 2019-06-10 DIAGNOSIS — E89.1 POST-PANCREATECTOMY DIABETES (H): ICD-10-CM

## 2019-06-10 DIAGNOSIS — E10.649 TYPE 1 DIABETES MELLITUS WITH HYPOGLYCEMIA AND WITHOUT COMA (H): Primary | ICD-10-CM

## 2019-06-10 DIAGNOSIS — Z94.9 CELL TRANSPLANT: ICD-10-CM

## 2019-06-10 DIAGNOSIS — Z90.410 POST-PANCREATECTOMY DIABETES (H): ICD-10-CM

## 2019-06-10 DIAGNOSIS — E13.9 POST-PANCREATECTOMY DIABETES (H): ICD-10-CM

## 2019-06-10 DIAGNOSIS — R10.9 ABDOMINAL PAIN: ICD-10-CM

## 2019-06-10 DIAGNOSIS — R10.9 ABDOMINAL PAIN, UNSPECIFIED ABDOMINAL LOCATION: ICD-10-CM

## 2019-06-10 LAB
ABO + RH BLD: NORMAL
ABO + RH BLD: NORMAL
ALBUMIN SERPL-MCNC: 3.8 G/DL (ref 3.4–5)
ALBUMIN UR-MCNC: NEGATIVE MG/DL
ALP SERPL-CCNC: 105 U/L (ref 40–150)
ALT SERPL W P-5'-P-CCNC: 59 U/L (ref 0–50)
ANION GAP SERPL CALCULATED.3IONS-SCNC: 9 MMOL/L (ref 3–14)
APPEARANCE UR: CLEAR
APTT PPP: 26 SEC (ref 22–37)
AST SERPL W P-5'-P-CCNC: 61 U/L (ref 0–45)
BACTERIA #/AREA URNS HPF: ABNORMAL /HPF
BASOPHILS # BLD AUTO: 0.1 10E9/L (ref 0–0.2)
BASOPHILS NFR BLD AUTO: 0.8 %
BILIRUB SERPL-MCNC: 0.4 MG/DL (ref 0.2–1.3)
BILIRUB UR QL STRIP: NEGATIVE
BLD GP AB SCN SERPL QL: NORMAL
BLOOD BANK CMNT PATIENT-IMP: NORMAL
BUN SERPL-MCNC: 10 MG/DL (ref 7–30)
CALCIUM SERPL-MCNC: 9 MG/DL (ref 8.5–10.1)
CHLORIDE SERPL-SCNC: 106 MMOL/L (ref 94–109)
CO2 SERPL-SCNC: 26 MMOL/L (ref 20–32)
COLOR UR AUTO: ABNORMAL
CORTIS SERPL-MCNC: 12.7 UG/DL (ref 4–22)
CREAT SERPL-MCNC: 0.71 MG/DL (ref 0.52–1.04)
DIFFERENTIAL METHOD BLD: ABNORMAL
EOSINOPHIL # BLD AUTO: 0.1 10E9/L (ref 0–0.7)
EOSINOPHIL NFR BLD AUTO: 0.7 %
ERYTHROCYTE [DISTWIDTH] IN BLOOD BY AUTOMATED COUNT: 13.7 % (ref 10–15)
GFR SERPL CREATININE-BSD FRML MDRD: >90 ML/MIN/{1.73_M2}
GLUCOSE BLDC GLUCOMTR-MCNC: 109 MG/DL (ref 70–99)
GLUCOSE BLDC GLUCOMTR-MCNC: 156 MG/DL (ref 70–99)
GLUCOSE BLDC GLUCOMTR-MCNC: 168 MG/DL (ref 70–99)
GLUCOSE BLDC GLUCOMTR-MCNC: 34 MG/DL (ref 70–99)
GLUCOSE BLDC GLUCOMTR-MCNC: 62 MG/DL (ref 70–99)
GLUCOSE BLDC GLUCOMTR-MCNC: 82 MG/DL (ref 70–99)
GLUCOSE SERPL-MCNC: 219 MG/DL (ref 70–99)
GLUCOSE UR STRIP-MCNC: 150 MG/DL
HCG UR QL: NEGATIVE
HCT VFR BLD AUTO: 33.8 % (ref 35–47)
HGB BLD-MCNC: 10.8 G/DL (ref 11.7–15.7)
HGB UR QL STRIP: NEGATIVE
IMM GRANULOCYTES # BLD: 0 10E9/L (ref 0–0.4)
IMM GRANULOCYTES NFR BLD: 0.3 %
INR PPP: 0.99 (ref 0.86–1.14)
INTERNAL QC OK POCT: YES
KETONES UR STRIP-MCNC: NEGATIVE MG/DL
LACTATE BLD-SCNC: 2.6 MMOL/L (ref 0.7–2)
LEUKOCYTE ESTERASE UR QL STRIP: ABNORMAL
LIPASE SERPL-CCNC: 21 U/L (ref 73–393)
LYMPHOCYTES # BLD AUTO: 2.8 10E9/L (ref 0.8–5.3)
LYMPHOCYTES NFR BLD AUTO: 36.2 %
MAGNESIUM SERPL-MCNC: 2.2 MG/DL (ref 1.6–2.3)
MCH RBC QN AUTO: 29.7 PG (ref 26.5–33)
MCHC RBC AUTO-ENTMCNC: 32 G/DL (ref 31.5–36.5)
MCV RBC AUTO: 93 FL (ref 78–100)
MONOCYTES # BLD AUTO: 0.7 10E9/L (ref 0–1.3)
MONOCYTES NFR BLD AUTO: 8.8 %
MUCOUS THREADS #/AREA URNS LPF: PRESENT /LPF
NEUTROPHILS # BLD AUTO: 4.1 10E9/L (ref 1.6–8.3)
NEUTROPHILS NFR BLD AUTO: 53.2 %
NITRATE UR QL: NEGATIVE
NRBC # BLD AUTO: 0 10*3/UL
NRBC BLD AUTO-RTO: 0 /100
PH UR STRIP: 5.5 PH (ref 5–7)
PHOSPHATE SERPL-MCNC: 3.4 MG/DL (ref 2.5–4.5)
PLATELET # BLD AUTO: 350 10E9/L (ref 150–450)
POTASSIUM SERPL-SCNC: 3.8 MMOL/L (ref 3.4–5.3)
PROT SERPL-MCNC: 7 G/DL (ref 6.8–8.8)
RBC # BLD AUTO: 3.64 10E12/L (ref 3.8–5.2)
RBC #/AREA URNS AUTO: <1 /HPF (ref 0–2)
SODIUM SERPL-SCNC: 141 MMOL/L (ref 133–144)
SOURCE: ABNORMAL
SP GR UR STRIP: 1 (ref 1–1.03)
SPECIMEN EXP DATE BLD: NORMAL
SQUAMOUS #/AREA URNS AUTO: <1 /HPF (ref 0–1)
UROBILINOGEN UR STRIP-MCNC: NORMAL MG/DL (ref 0–2)
WBC # BLD AUTO: 7.6 10E9/L (ref 4–11)
WBC #/AREA URNS AUTO: 2 /HPF (ref 0–5)

## 2019-06-10 PROCEDURE — 81025 URINE PREGNANCY TEST: CPT | Performed by: EMERGENCY MEDICINE

## 2019-06-10 PROCEDURE — 74177 CT ABD & PELVIS W/CONTRAST: CPT

## 2019-06-10 PROCEDURE — 25000128 H RX IP 250 OP 636: Performed by: RADIOLOGY

## 2019-06-10 PROCEDURE — 25000132 ZZH RX MED GY IP 250 OP 250 PS 637: Mod: GY | Performed by: STUDENT IN AN ORGANIZED HEALTH CARE EDUCATION/TRAINING PROGRAM

## 2019-06-10 PROCEDURE — C9113 INJ PANTOPRAZOLE SODIUM, VIA: HCPCS | Performed by: EMERGENCY MEDICINE

## 2019-06-10 PROCEDURE — A9270 NON-COVERED ITEM OR SERVICE: HCPCS | Mod: GY | Performed by: STUDENT IN AN ORGANIZED HEALTH CARE EDUCATION/TRAINING PROGRAM

## 2019-06-10 PROCEDURE — 83036 HEMOGLOBIN GLYCOSYLATED A1C: CPT | Performed by: EMERGENCY MEDICINE

## 2019-06-10 PROCEDURE — 85730 THROMBOPLASTIN TIME PARTIAL: CPT | Performed by: EMERGENCY MEDICINE

## 2019-06-10 PROCEDURE — 99285 EMERGENCY DEPT VISIT HI MDM: CPT | Mod: Z6 | Performed by: EMERGENCY MEDICINE

## 2019-06-10 PROCEDURE — 25000128 H RX IP 250 OP 636: Performed by: EMERGENCY MEDICINE

## 2019-06-10 PROCEDURE — 86901 BLOOD TYPING SEROLOGIC RH(D): CPT | Performed by: EMERGENCY MEDICINE

## 2019-06-10 PROCEDURE — 96365 THER/PROPH/DIAG IV INF INIT: CPT | Mod: 59 | Performed by: EMERGENCY MEDICINE

## 2019-06-10 PROCEDURE — 96366 THER/PROPH/DIAG IV INF ADDON: CPT | Performed by: EMERGENCY MEDICINE

## 2019-06-10 PROCEDURE — 96376 TX/PRO/DX INJ SAME DRUG ADON: CPT | Performed by: EMERGENCY MEDICINE

## 2019-06-10 PROCEDURE — 87040 BLOOD CULTURE FOR BACTERIA: CPT | Performed by: EMERGENCY MEDICINE

## 2019-06-10 PROCEDURE — 25800025 ZZH RX 258: Performed by: STUDENT IN AN ORGANIZED HEALTH CARE EDUCATION/TRAINING PROGRAM

## 2019-06-10 PROCEDURE — 85610 PROTHROMBIN TIME: CPT | Performed by: EMERGENCY MEDICINE

## 2019-06-10 PROCEDURE — 12000001 ZZH R&B MED SURG/OB UMMC

## 2019-06-10 PROCEDURE — 82533 TOTAL CORTISOL: CPT | Performed by: EMERGENCY MEDICINE

## 2019-06-10 PROCEDURE — 85025 COMPLETE CBC W/AUTO DIFF WBC: CPT | Performed by: EMERGENCY MEDICINE

## 2019-06-10 PROCEDURE — 25000128 H RX IP 250 OP 636: Performed by: STUDENT IN AN ORGANIZED HEALTH CARE EDUCATION/TRAINING PROGRAM

## 2019-06-10 PROCEDURE — 25800030 ZZH RX IP 258 OP 636: Performed by: EMERGENCY MEDICINE

## 2019-06-10 PROCEDURE — 83605 ASSAY OF LACTIC ACID: CPT | Performed by: EMERGENCY MEDICINE

## 2019-06-10 PROCEDURE — 36415 COLL VENOUS BLD VENIPUNCTURE: CPT | Performed by: EMERGENCY MEDICINE

## 2019-06-10 PROCEDURE — 84100 ASSAY OF PHOSPHORUS: CPT | Performed by: EMERGENCY MEDICINE

## 2019-06-10 PROCEDURE — 87496 CYTOMEG DNA AMP PROBE: CPT | Performed by: EMERGENCY MEDICINE

## 2019-06-10 PROCEDURE — 96375 TX/PRO/DX INJ NEW DRUG ADDON: CPT | Performed by: EMERGENCY MEDICINE

## 2019-06-10 PROCEDURE — 83735 ASSAY OF MAGNESIUM: CPT | Performed by: EMERGENCY MEDICINE

## 2019-06-10 PROCEDURE — 99285 EMERGENCY DEPT VISIT HI MDM: CPT | Mod: 25 | Performed by: EMERGENCY MEDICINE

## 2019-06-10 PROCEDURE — 81001 URINALYSIS AUTO W/SCOPE: CPT | Performed by: EMERGENCY MEDICINE

## 2019-06-10 PROCEDURE — 86850 RBC ANTIBODY SCREEN: CPT | Performed by: EMERGENCY MEDICINE

## 2019-06-10 PROCEDURE — 96361 HYDRATE IV INFUSION ADD-ON: CPT | Performed by: EMERGENCY MEDICINE

## 2019-06-10 PROCEDURE — 99223 1ST HOSP IP/OBS HIGH 75: CPT | Mod: AI | Performed by: INTERNAL MEDICINE

## 2019-06-10 PROCEDURE — 25800025 ZZH RX 258: Performed by: HOSPITALIST

## 2019-06-10 PROCEDURE — 86900 BLOOD TYPING SEROLOGIC ABO: CPT | Performed by: EMERGENCY MEDICINE

## 2019-06-10 PROCEDURE — 80053 COMPREHEN METABOLIC PANEL: CPT | Performed by: EMERGENCY MEDICINE

## 2019-06-10 PROCEDURE — 25800030 ZZH RX IP 258 OP 636: Performed by: STUDENT IN AN ORGANIZED HEALTH CARE EDUCATION/TRAINING PROGRAM

## 2019-06-10 PROCEDURE — 87086 URINE CULTURE/COLONY COUNT: CPT | Performed by: EMERGENCY MEDICINE

## 2019-06-10 PROCEDURE — 00000146 ZZHCL STATISTIC GLUCOSE BY METER IP

## 2019-06-10 PROCEDURE — 83690 ASSAY OF LIPASE: CPT | Performed by: EMERGENCY MEDICINE

## 2019-06-10 RX ORDER — METOCLOPRAMIDE 10 MG/1
10 TABLET ORAL EVERY 6 HOURS PRN
Status: DISCONTINUED | OUTPATIENT
Start: 2019-06-10 | End: 2019-06-14 | Stop reason: HOSPADM

## 2019-06-10 RX ORDER — ONDANSETRON 2 MG/ML
4 INJECTION INTRAMUSCULAR; INTRAVENOUS EVERY 6 HOURS PRN
Status: DISCONTINUED | OUTPATIENT
Start: 2019-06-10 | End: 2019-06-14 | Stop reason: HOSPADM

## 2019-06-10 RX ORDER — HYDROMORPHONE HYDROCHLORIDE 1 MG/ML
0.5 INJECTION, SOLUTION INTRAMUSCULAR; INTRAVENOUS; SUBCUTANEOUS
Status: DISCONTINUED | OUTPATIENT
Start: 2019-06-10 | End: 2019-06-10

## 2019-06-10 RX ORDER — ONDANSETRON 4 MG/1
4 TABLET, ORALLY DISINTEGRATING ORAL EVERY 6 HOURS PRN
Status: DISCONTINUED | OUTPATIENT
Start: 2019-06-10 | End: 2019-06-14 | Stop reason: HOSPADM

## 2019-06-10 RX ORDER — PROCHLORPERAZINE MALEATE 5 MG
10 TABLET ORAL EVERY 6 HOURS PRN
Status: DISCONTINUED | OUTPATIENT
Start: 2019-06-10 | End: 2019-06-14 | Stop reason: HOSPADM

## 2019-06-10 RX ORDER — NICOTINE POLACRILEX 4 MG
15-30 LOZENGE BUCCAL
Status: DISCONTINUED | OUTPATIENT
Start: 2019-06-10 | End: 2019-06-14 | Stop reason: HOSPADM

## 2019-06-10 RX ORDER — FUROSEMIDE 20 MG
20 TABLET ORAL 2 TIMES DAILY
Status: DISCONTINUED | OUTPATIENT
Start: 2019-06-10 | End: 2019-06-10

## 2019-06-10 RX ORDER — METOCLOPRAMIDE 10 MG/1
10 TABLET ORAL 3 TIMES DAILY
Status: DISCONTINUED | OUTPATIENT
Start: 2019-06-10 | End: 2019-06-14 | Stop reason: HOSPADM

## 2019-06-10 RX ORDER — HYDROCORTISONE 10 MG/1
10 TABLET ORAL 2 TIMES DAILY
Status: DISCONTINUED | OUTPATIENT
Start: 2019-06-10 | End: 2019-06-11

## 2019-06-10 RX ORDER — HYDROMORPHONE HYDROCHLORIDE 1 MG/ML
0.5 INJECTION, SOLUTION INTRAMUSCULAR; INTRAVENOUS; SUBCUTANEOUS
Status: DISCONTINUED | OUTPATIENT
Start: 2019-06-10 | End: 2019-06-12

## 2019-06-10 RX ORDER — ACETAMINOPHEN 325 MG/1
650 TABLET ORAL EVERY 4 HOURS PRN
Status: DISCONTINUED | OUTPATIENT
Start: 2019-06-10 | End: 2019-06-14 | Stop reason: HOSPADM

## 2019-06-10 RX ORDER — METOCLOPRAMIDE HYDROCHLORIDE 5 MG/ML
10 INJECTION INTRAMUSCULAR; INTRAVENOUS EVERY 6 HOURS PRN
Status: DISCONTINUED | OUTPATIENT
Start: 2019-06-10 | End: 2019-06-14 | Stop reason: HOSPADM

## 2019-06-10 RX ORDER — PANTOPRAZOLE SODIUM 40 MG/1
40 TABLET, DELAYED RELEASE ORAL 2 TIMES DAILY
Status: DISCONTINUED | OUTPATIENT
Start: 2019-06-10 | End: 2019-06-14 | Stop reason: HOSPADM

## 2019-06-10 RX ORDER — DICYCLOMINE HYDROCHLORIDE 10 MG/1
10 CAPSULE ORAL EVERY 6 HOURS
COMMUNITY

## 2019-06-10 RX ORDER — DICYCLOMINE HYDROCHLORIDE 10 MG/1
10 CAPSULE ORAL EVERY 6 HOURS
Status: DISCONTINUED | OUTPATIENT
Start: 2019-06-10 | End: 2019-06-14 | Stop reason: HOSPADM

## 2019-06-10 RX ORDER — CYCLOBENZAPRINE HCL 5 MG
5 TABLET ORAL 3 TIMES DAILY PRN
Status: DISCONTINUED | OUTPATIENT
Start: 2019-06-10 | End: 2019-06-14 | Stop reason: HOSPADM

## 2019-06-10 RX ORDER — ONDANSETRON 2 MG/ML
4 INJECTION INTRAMUSCULAR; INTRAVENOUS EVERY 30 MIN PRN
Status: DISCONTINUED | OUTPATIENT
Start: 2019-06-10 | End: 2019-06-10

## 2019-06-10 RX ORDER — OXYCODONE HYDROCHLORIDE 5 MG/1
5 TABLET ORAL
Status: DISCONTINUED | OUTPATIENT
Start: 2019-06-10 | End: 2019-06-10

## 2019-06-10 RX ORDER — ONDANSETRON 2 MG/ML
4 INJECTION INTRAMUSCULAR; INTRAVENOUS EVERY 30 MIN PRN
Status: COMPLETED | OUTPATIENT
Start: 2019-06-10 | End: 2019-06-10

## 2019-06-10 RX ORDER — DEXTROSE MONOHYDRATE 25 G/50ML
25-50 INJECTION, SOLUTION INTRAVENOUS
Status: DISCONTINUED | OUTPATIENT
Start: 2019-06-10 | End: 2019-06-10

## 2019-06-10 RX ORDER — DULOXETIN HYDROCHLORIDE 30 MG/1
30 CAPSULE, DELAYED RELEASE ORAL DAILY
Status: DISCONTINUED | OUTPATIENT
Start: 2019-06-10 | End: 2019-06-10

## 2019-06-10 RX ORDER — TOPIRAMATE 100 MG/1
100 TABLET, FILM COATED ORAL 2 TIMES DAILY
Status: DISCONTINUED | OUTPATIENT
Start: 2019-06-10 | End: 2019-06-14 | Stop reason: HOSPADM

## 2019-06-10 RX ORDER — IOPAMIDOL 755 MG/ML
85 INJECTION, SOLUTION INTRAVASCULAR ONCE
Status: COMPLETED | OUTPATIENT
Start: 2019-06-10 | End: 2019-06-10

## 2019-06-10 RX ORDER — DULOXETIN HYDROCHLORIDE 60 MG/1
60 CAPSULE, DELAYED RELEASE ORAL DAILY
Status: DISCONTINUED | OUTPATIENT
Start: 2019-06-10 | End: 2019-06-10

## 2019-06-10 RX ORDER — DEXTROSE MONOHYDRATE 25 G/50ML
25-50 INJECTION, SOLUTION INTRAVENOUS
Status: DISCONTINUED | OUTPATIENT
Start: 2019-06-10 | End: 2019-06-14 | Stop reason: HOSPADM

## 2019-06-10 RX ORDER — HYDROMORPHONE HYDROCHLORIDE 1 MG/ML
0.5 INJECTION, SOLUTION INTRAMUSCULAR; INTRAVENOUS; SUBCUTANEOUS
Status: COMPLETED | OUTPATIENT
Start: 2019-06-10 | End: 2019-06-10

## 2019-06-10 RX ORDER — PREGABALIN 75 MG/1
150 CAPSULE ORAL 3 TIMES DAILY
Status: DISCONTINUED | OUTPATIENT
Start: 2019-06-10 | End: 2019-06-14 | Stop reason: HOSPADM

## 2019-06-10 RX ORDER — NALOXONE HYDROCHLORIDE 0.4 MG/ML
.1-.4 INJECTION, SOLUTION INTRAMUSCULAR; INTRAVENOUS; SUBCUTANEOUS
Status: DISCONTINUED | OUTPATIENT
Start: 2019-06-10 | End: 2019-06-14 | Stop reason: HOSPADM

## 2019-06-10 RX ORDER — TRAZODONE HYDROCHLORIDE 100 MG/1
100-200 TABLET ORAL AT BEDTIME
Status: DISCONTINUED | OUTPATIENT
Start: 2019-06-10 | End: 2019-06-14 | Stop reason: HOSPADM

## 2019-06-10 RX ORDER — PROCHLORPERAZINE 25 MG
25 SUPPOSITORY, RECTAL RECTAL EVERY 12 HOURS PRN
Status: DISCONTINUED | OUTPATIENT
Start: 2019-06-10 | End: 2019-06-14 | Stop reason: HOSPADM

## 2019-06-10 RX ORDER — DRONABINOL 2.5 MG/1
5 CAPSULE ORAL 2 TIMES DAILY PRN
Status: DISCONTINUED | OUTPATIENT
Start: 2019-06-10 | End: 2019-06-14 | Stop reason: HOSPADM

## 2019-06-10 RX ORDER — NICOTINE POLACRILEX 4 MG
15-30 LOZENGE BUCCAL
Status: DISCONTINUED | OUTPATIENT
Start: 2019-06-10 | End: 2019-06-10

## 2019-06-10 RX ORDER — OXYCODONE HYDROCHLORIDE 5 MG/1
5 TABLET ORAL EVERY 4 HOURS PRN
Status: DISCONTINUED | OUTPATIENT
Start: 2019-06-10 | End: 2019-06-14 | Stop reason: HOSPADM

## 2019-06-10 RX ORDER — POTASSIUM CHLORIDE 750 MG/1
20 TABLET, EXTENDED RELEASE ORAL DAILY
Status: DISCONTINUED | OUTPATIENT
Start: 2019-06-10 | End: 2019-06-14 | Stop reason: HOSPADM

## 2019-06-10 RX ORDER — SUMATRIPTAN 50 MG/1
50 TABLET, FILM COATED ORAL
Status: DISCONTINUED | OUTPATIENT
Start: 2019-06-10 | End: 2019-06-14 | Stop reason: HOSPADM

## 2019-06-10 RX ORDER — LEVOTHYROXINE SODIUM 112 UG/1
112 TABLET ORAL DAILY
Status: DISCONTINUED | OUTPATIENT
Start: 2019-06-10 | End: 2019-06-14 | Stop reason: HOSPADM

## 2019-06-10 RX ORDER — DEXTROSE, SODIUM CHLORIDE, SODIUM LACTATE, POTASSIUM CHLORIDE, AND CALCIUM CHLORIDE 5; .6; .31; .03; .02 G/100ML; G/100ML; G/100ML; G/100ML; G/100ML
INJECTION, SOLUTION INTRAVENOUS CONTINUOUS
Status: DISCONTINUED | OUTPATIENT
Start: 2019-06-10 | End: 2019-06-11

## 2019-06-10 RX ADMIN — HYDROMORPHONE HYDROCHLORIDE 0.5 MG: 1 INJECTION, SOLUTION INTRAMUSCULAR; INTRAVENOUS; SUBCUTANEOUS at 23:15

## 2019-06-10 RX ADMIN — SODIUM CHLORIDE, SODIUM LACTATE, POTASSIUM CHLORIDE, CALCIUM CHLORIDE AND DEXTROSE MONOHYDRATE: 5; 600; 310; 30; 20 INJECTION, SOLUTION INTRAVENOUS at 22:08

## 2019-06-10 RX ADMIN — SODIUM CHLORIDE 1000 ML: 9 INJECTION, SOLUTION INTRAVENOUS at 13:32

## 2019-06-10 RX ADMIN — OXYCODONE HYDROCHLORIDE 5 MG: 5 TABLET ORAL at 22:16

## 2019-06-10 RX ADMIN — ONDANSETRON 4 MG: 2 INJECTION INTRAMUSCULAR; INTRAVENOUS at 15:52

## 2019-06-10 RX ADMIN — SODIUM CHLORIDE 1000 ML: 9 INJECTION, SOLUTION INTRAVENOUS at 10:34

## 2019-06-10 RX ADMIN — DEXTROSE 50 % IN WATER (D50W) INTRAVENOUS SYRINGE 25 ML: at 21:55

## 2019-06-10 RX ADMIN — SODIUM CHLORIDE, POTASSIUM CHLORIDE, SODIUM LACTATE AND CALCIUM CHLORIDE 500 ML: 600; 310; 30; 20 INJECTION, SOLUTION INTRAVENOUS at 19:25

## 2019-06-10 RX ADMIN — HYDROMORPHONE HYDROCHLORIDE 0.5 MG: 1 INJECTION, SOLUTION INTRAMUSCULAR; INTRAVENOUS; SUBCUTANEOUS at 11:13

## 2019-06-10 RX ADMIN — TRAZODONE HYDROCHLORIDE 100 MG: 100 TABLET ORAL at 23:15

## 2019-06-10 RX ADMIN — HYDROMORPHONE HYDROCHLORIDE 0.5 MG: 1 INJECTION, SOLUTION INTRAMUSCULAR; INTRAVENOUS; SUBCUTANEOUS at 15:52

## 2019-06-10 RX ADMIN — ONDANSETRON 4 MG: 2 INJECTION INTRAMUSCULAR; INTRAVENOUS at 22:08

## 2019-06-10 RX ADMIN — ONDANSETRON 4 MG: 2 INJECTION INTRAMUSCULAR; INTRAVENOUS at 13:16

## 2019-06-10 RX ADMIN — HYDROCORTISONE 10 MG: 10 TABLET ORAL at 21:29

## 2019-06-10 RX ADMIN — ONDANSETRON 4 MG: 2 INJECTION INTRAMUSCULAR; INTRAVENOUS at 11:35

## 2019-06-10 RX ADMIN — HYDROMORPHONE HYDROCHLORIDE 0.5 MG: 1 INJECTION, SOLUTION INTRAMUSCULAR; INTRAVENOUS; SUBCUTANEOUS at 13:16

## 2019-06-10 RX ADMIN — ONDANSETRON 4 MG: 2 INJECTION INTRAMUSCULAR; INTRAVENOUS at 10:34

## 2019-06-10 RX ADMIN — HYDROMORPHONE HYDROCHLORIDE 0.5 MG: 1 INJECTION, SOLUTION INTRAMUSCULAR; INTRAVENOUS; SUBCUTANEOUS at 19:57

## 2019-06-10 RX ADMIN — IOPAMIDOL 85 ML: 755 INJECTION, SOLUTION INTRAVENOUS at 11:42

## 2019-06-10 RX ADMIN — DEXTROSE 50 % IN WATER (D50W) INTRAVENOUS SYRINGE 25 ML: at 19:24

## 2019-06-10 RX ADMIN — HYDROMORPHONE HYDROCHLORIDE 0.5 MG: 1 INJECTION, SOLUTION INTRAMUSCULAR; INTRAVENOUS; SUBCUTANEOUS at 11:35

## 2019-06-10 RX ADMIN — TOPIRAMATE 100 MG: 100 TABLET, FILM COATED ORAL at 21:30

## 2019-06-10 RX ADMIN — PROCHLORPERAZINE EDISYLATE 10 MG: 5 INJECTION INTRAMUSCULAR; INTRAVENOUS at 18:44

## 2019-06-10 RX ADMIN — HYDROMORPHONE HYDROCHLORIDE 0.5 MG: 1 INJECTION, SOLUTION INTRAMUSCULAR; INTRAVENOUS; SUBCUTANEOUS at 17:49

## 2019-06-10 RX ADMIN — FUROSEMIDE 20 MG: 20 TABLET ORAL at 21:28

## 2019-06-10 RX ADMIN — SODIUM CHLORIDE 80 MG: 9 INJECTION, SOLUTION INTRAVENOUS at 11:31

## 2019-06-10 RX ADMIN — METOCLOPRAMIDE 10 MG: 5 INJECTION, SOLUTION INTRAMUSCULAR; INTRAVENOUS at 19:53

## 2019-06-10 ASSESSMENT — ENCOUNTER SYMPTOMS
FEVER: 1
CONSTIPATION: 0
COUGH: 0
DIARRHEA: 1
NAUSEA: 1
APPETITE CHANGE: 1
SHORTNESS OF BREATH: 0
DYSURIA: 0
ABDOMINAL DISTENTION: 0
VOMITING: 1
ABDOMINAL PAIN: 1
BLOOD IN STOOL: 1
FATIGUE: 1

## 2019-06-10 ASSESSMENT — PAIN DESCRIPTION - DESCRIPTORS
DESCRIPTORS: ACHING

## 2019-06-10 ASSESSMENT — ACTIVITIES OF DAILY LIVING (ADL): ADLS_ACUITY_SCORE: 13

## 2019-06-10 NOTE — ED TRIAGE NOTES
Pt arrived via car with c/o melena and maroon colored emesis since Friday. Pt reports it has become more frequent over the past two days and was directed to come to the ED yesterday by the nurse line. Pt reports she is feeling increased weakness. Vitals stable on arrival.

## 2019-06-10 NOTE — H&P
"Internal Medicine History and Physical  [facility], [place]  Date of Admission: Meme 10, 2019  Chief Complaint: abdominal pain  -----------------------------------------------------------------  History of Present Illness   53F presenting with central abdominal pain with a PMH of TPIAT (2012), appendectomy, cholecystectomy, adrenal insufficiency, migraine, and MDD/anxiety.    On Friday 6/7/19 had mild abdominal pain in the center/upper region of her abdomen that gradually increased to severe over the next 24 hours. By 6/8/19 it had become 10/10 severity, sharp, aching, throbbing, with some radiation to the back. There was also some pain like someone \"punching\" out of her abdomen when she urinated, but no burning sensation with urination. Moreover, she reports that she began having pitch black loose tarry stools on 6/7/19 that has continued at a frequency of 3-4 times per day (two times today, last stool this morning, none visualized by provider). She also had emesis after attempting to eat yesterday that she reports included blood and dark black coffee ground with clots (none yet today). She has continued to take her pantoprazole BID 40mg and took tylenol for a fever that she measured at home yesterday at 102.5.  Today, the fever has resolved but the abdominal pain continues at 10/10.  She otherwise denies hematochezia (only melena), chest pain, dyspnea, new cough, sore throat, myalgias, hemorrhoids, new types of food, special diets, recent sick contacts, NSAID use, recent alcohol use, tobacco, or other drug use. Last EGD in 2017 without concerning findings and a colonoscopy in 2017 for rectal bleeding concerns did not show an acute source. When feeling better, she enjoys time at her cabin with her  and son.    In the emergency department she was afebrile and her vital signs were stable.  Hemoglobin was low at 10.8 from 12.6 in April. No leukocytosis.   BMP was unremarkable, ALT " and AST was slightly elevated at 59 and 61 but lower than previously in April; lipase was low at 21 (no pancreas), lactic acid was elevated at 2.6.  CT abdomen pelvis shows nonspecific findings suggestive of enterocolitis including mild hyperemia of the distal small bowel wall edema of the rectosigmoid colon.  No evidence of obstruction.  No drainable intra-abdominal fluid collections.  There was also some moderate bladder distention.  Rectal exam showed no external hemorrhoids or anal fissures and light brown, formed, soft stool in the rectal vault.  This was faintly guaiac positive, and there was no gross blood or melena.     -----------------------------------------------------------------  Assessment & Plan   53F presenting with central abdominal pain with a PMH of TPIAT (2012), appendectomy, cholecystectomy, adrenal insufficiency, migraine, and MDD/anxiety.    #Abdominal pain  #?Enterocolitis on CT  #Acute anemia (1.8g hemoglobin drop)  #Lactic acidosis  #TPIAT (2012), appendectomy, cholecystectomy  Presentation concerning for possible resolved/resolving UGIB versus PUD versus an enterocolitis NOS. She reports melena at home though in the ED rectal exam was not concerning for overt melena or hematochezia. Reassuring at this time are stable vitals, hgb drop of only 1.8, only two stools so far today, lack of leukocytosis, and a benign abdominal exam. Etiologies of upper GI bleed could include peptic ulcer disease, gastropathy, esophagitis, vascular lesions (AVM, gave, etc.), or Kelley-Bianchi with emesis, or a less likely malignancy given acute presentation. No recent EtOH or NSAIDs. No diarrhea today with only two stools. Given her extensive abdominal surgeries, adhesions is always a consideration.  -GI consult, recs appreciated  -Continue pantoprazole 40 mg twice daily  -Tylenol for pain, dilaudid 1mg q3hr for breakthrough  -Trend hemoglobin daily while not having overt melena/VSS stable  - Continue PTA  "bentyl  - Hold ASA 81 for now    #T1DM, hypoglycemia  S/p TPIAT with loss of most of endocrine function, on home insulin pump. A1C 4/18/19 was 9.6. Initial glucose 219 on admission then later was 34 without any insulin given but likely her home insulin pump was still running. Spoke with endocrine and they will see her tomorrow and for now will do 8U degludec, D5 drip at 50cc/hr and check glucose q4hr with low ISS. Not taking PO.  - Endocrine consult  - 8U degludec  - 5% D5/LR at 50cc/hr  - Use low-scale ISS (or very-low)    #Dysphagia  Reports food getting stuck in mouth, some emesis after attempting to eat. Requesting IV meds versus orals.  - S/S consult if not improving with being able to take PO    #Adrenal insufficiency  - Endocrine consult  - Has been on lasix 20mg BID for \"fluid retention likely related to her steroid use\" since 2016 (normal echo that year); no edema now and will continue given good BP  - Home is 10mg hydrocortisone BID    #Anx/depression  - PTA duloxetine  - PTA topiramate  - PTA trazodone    #Chronic pain  - PTA pregabalin    Diet: Orders Placed This Encounter      NPO for Medical/Clinical Reasons Except for: No Exceptions    Fluids: 500cc bolus  Lines: PIV, will need additional if new concern for active bleed  Mckee Catheter: not present  DVT Prophylaxis: mechanical; concern for possible GIB  Code Status: full  Expected discharge: 2 - 3 days +  The patient was discussed with Dr. Valentin.    Durga Brown MD  [facility]  Pager: [phone]  -----------------------------------------------------------------  Physical Exam   /59   Pulse 73   Temp 98.3  F (36.8  C) (Oral)   Wt 62.8 kg (138 lb 8 oz)   SpO2 96%   BMI 25.33 kg/m      General Appearance: NAD  HEENT: eyes equal and reactive to light  Respiratory: CTAB  Cardiovascular: RRR, no murmurs  GI: abdomen totally nontender when discussing other matters, does report tenderness with central abdomen and RLQ when " asked, nondistended, BS+  Lymph/Hematologic: no LAD  Genitourinary: not examined  Skin: no rashes   Musculoskeletal: no edema  Neurologic: A&O  Psychiatric: appropriate affect    -----------------------------------------------------------------  Additional Patient History  Review of Systems   The 10 point Review of Systems is negative other than noted in the HPI or here.    Past Medical History    I have reviewed this patient's medical history and updated it with pertinent information if needed.   Past Medical History:   Diagnosis Date     Chronic abdominal pain      Chronic pancreatitis (H)     S/P pancreatectomy     Depression with anxiety      Diabetes mellitus (H) 1/2012     Gastro-oesophageal reflux disease      Hypothyroidism 4/23/2015     Kidney stones      Low serum cortisol level (H)      Migraines      Other chronic pain     STOMACH     Other chronic pain     LUMBAR SPINE     Spasm of sphincter of Oddi        Past Surgical History   I have reviewed this patient's surgical history and updated it with pertinent information if needed.  Past Surgical History:   Procedure Laterality Date     ARTHROPLASTY CARPOMETACARPAL (THUMB JOINT)  5/2/2014    Procedure: ARTHROPLASTY CARPOMETACARPAL (THUMB JOINT);  Surgeon: Carina Panda MD;  Location: [facility] OR     CHOLECYSTECTOMY  2004     COLONOSCOPY  7/18/2014    Procedure: COLONOSCOPY;  Surgeon: Aurora Sahu MD;  Location:  GI     COLONOSCOPY N/A 8/1/2017    Procedure: COLONOSCOPY;  Colonoscopy and upper endoscopy;  Surgeon: Deirdre Harris MD;  Location:  GI     ENDOSCOPIC RETROGRADE CHOLANGIOPANCREATOGRAM       ENDOSCOPIC RETROGRADE CHOLANGIOPANCREATOGRAM  4/19/2011    Procedure:ENDOSCOPIC RETROGRADE CHOLANGIOPANCREATOGRAM; Pancreatic Stent Placement       ENDOSCOPIC RETROGRADE CHOLANGIOPANCREATOGRAM  5/26/2011    Procedure:ENDOSCOPIC RETROGRADE CHOLANGIOPANCREATOGRAM; with Pancreatic Stent Removal; Surgeon:DALE MIMS;  Location:[facility] OR     ENDOSCOPY UPPER, COLONOSCOPY, COMBINED  4/25/2012    Procedure:COMBINED ENDOSCOPY UPPER, COLONOSCOPY; Enteroscopy with Bile Duct Stent Removal, Colonoscopy  *Late[facility] Safe Room*; Surgeon:GRACY GODWIN; Location:[facility] OR     ESOPHAGOSCOPY, GASTROSCOPY, DUODENOSCOPY (EGD), COMBINED  5/26/2011    Procedure:COMBINED ESOPHAGOSCOPY, GASTROSCOPY, DUODENOSCOPY (EGD); Surgeon:DALE MIMS; Location[facility]     ESOPHAGOSCOPY, GASTROSCOPY, DUODENOSCOPY (EGD), COMBINED N/A 10/30/2014    Procedure: COMBINED ESOPHAGOSCOPY, GASTROSCOPY, DUODENOSCOPY (EGD), BIOPSY SINGLE OR MULTIPLE;  Surgeon: Sarai Moon MD;  Location: [facility] GI     ESOPHAGOSCOPY, GASTROSCOPY, DUODENOSCOPY (EGD), COMBINED Left 7/6/2015    Procedure: COMBINED ESOPHAGOSCOPY, GASTROSCOPY, DUODENOSCOPY (EGD), BIOPSY SINGLE OR MULTIPLE;  Surgeon: Thomas Estrada MD;  Location:  GI     ESOPHAGOSCOPY, GASTROSCOPY, DUODENOSCOPY (EGD), COMBINED N/A 7/8/2016    Procedure: COMBINED ESOPHAGOSCOPY, GASTROSCOPY, DUODENOSCOPY (EGD), BIOPSY SINGLE OR MULTIPLE;  Surgeon: Eloy Klein MD;  Location:  GI     ESOPHAGOSCOPY, GASTROSCOPY, DUODENOSCOPY (EGD), COMBINED N/A 8/4/2016    Procedure: COMBINED ESOPHAGOSCOPY, GASTROSCOPY, DUODENOSCOPY (EGD), BIOPSY SINGLE OR MULTIPLE;  Surgeon: Jason Brown MD;  Location:  GI     ESOPHAGOSCOPY, GASTROSCOPY, DUODENOSCOPY (EGD), COMBINED N/A 8/1/2017    Procedure: COMBINED ESOPHAGOSCOPY, GASTROSCOPY, DUODENOSCOPY (EGD);;  Surgeon: Deirdre Harris MD;  Location:  GI     GYN SURGERY      Hysterectomy and USO     [facility] ENDOSCOPY W EUS  7/20/2011    Procedure:COMBINED ENDOSCOPIC ULTRASOUND, ESOPHAGOSCOPY, GASTROSCOPY, DUODENOSCOPY (EGD); Surgeon:DARVIN DONOHUE; Location: GI     HERNIORRHAPHY VENTRAL N/A 9/15/2016    Procedure: HERNIORRHAPHY VENTRAL;  Surgeon: Juanita Bernabe MD;  [facility]     HYSTERECTOMY  1997 or 1998    USO     INCISION  AND DRAINAGE ABDOMEN WASHOUT, COMBINED  8/16/2012    Procedure: COMBINED INCISION AND DRAINAGE ABDOMEN WASHOUT;  ,debridement and Drainage Post Appendectomy;  Surgeon: Ron Austin MD;  [facility]: [facility]     INJECT TRANSVERSUS ABDOMINIS PLANE (TAP) BLOCK BILATERAL Bilateral 5/26/2016    Procedure: INJECT TRANSVERSUS ABDOMINIS PLANE (TAP) BLOCK BILATERAL;  Surgeon: Leonard Mccallum MD;  [facility]     LAPAROSCOPIC APPENDECTOMY  7/30/2012    Procedure: LAPAROSCOPIC APPENDECTOMY;  Open Appendectomy;  Surgeon: Ron Austin MD;  [facility]: [facility]     PANCREATECTOMY, TRANSPLANT AUTO ISLET CELL, COMBINED  1/6/2012    Procedure:COMBINED PANCREATECTOMY, TRANSPLANT AUTO ISLET CELL; Total  Pancreatectomy, Auto Islet Transplant, splenectomy, 18fr. transgastric-jejunal feeding tube placement, liver biopsy; Surgeon:PALAK LEE; Location[facility]     REPLACE GASTROSTOMY TUBE, PERCUTANEOUS N/A 8/30/2017    Procedure: REPLACE GASTROSTOMY TUBE, PERCUTANEOUS;  GJ Tube Change;  Surgeon: Jose Nath PA-C;  [facility]: [facility]     SPLENECTOMY         Social History   Social History     Tobacco Use     Smoking status: Never Smoker     Smokeless tobacco: Never Used   Substance Use Topics     Alcohol use: No     Alcohol/week: 0.0 oz     Drug use: No       Family History   I have reviewed this patient's family history and updated it with pertinent information if needed.   Family History   Problem Relation Age of Onset     Hypertension Mother      Diabetes Mother      Osteoporosis Mother      Cancer Father         pancreatic cancer     Diabetes Maternal Grandmother      Cardiovascular Maternal Grandmother      Cancer Maternal Grandfather         lung cancer     Cancer Sister         brain     Cancer Sister         liver cancer       Prior to Admission Medications     No current facility-administered medications on file prior to encounter.   Current Outpatient Medications on File Prior to  Encounter:  acetaminophen 500 MG CAPS Take 1,000 mg by mouth three times a day as needed.   alendronate (FOSAMAX) 70 MG tablet Take 1 tablet (70 mg) by mouth every 7 days On Sundays take first thing in the morning with plain water and remain upright for at least 30 minutes and until after first food of the dayDo not restart Fosamax (alendronate) until your difficulty swallowing has resolved and you have finished the entire course of fluconazole (Diflucan).   alum & mag hydroxide-simethicone (MYLANTA/MAALOX) 200-200-25 MG CHEW chewable tablet Take 1 tablet by mouth 3 times daily as needed for indigestion   amylase-lipase-protease (CREON 12) 99328 units CPEP Take 6 to 8 capsules by mouth with meals and take 4 capsules with snacks. Needs up to 25 capsules per day   aspirin 81 MG tablet Take 81 mg by mouth daily.   cyclobenzaprine (FLEXERIL) 5 MG tablet Take 1 tablet (5 mg) by mouth 3 times daily as needed for muscle spasms   diclofenac (FLECTOR) 1.3 % Patch Place 1 patch onto the skin 2 times daily   diclofenac (VOLTAREN) 1 % GEL 2 g Apply 2 g to skin four times a day as needed (to affected upper extremity joint(s)). Maximum 8g/day per joint, 16g/day total.   dicyclomine (BENTYL) 10 MG capsule Take 10 mg by mouth every 6 hours   dronabinol (MARINOL) 2.5 MG capsule Take 2 capsules (5 mg) by mouth 2 times daily as needed   DULoxetine (CYMBALTA) 30 MG capsule Take 1 capsule (30 mg) by mouth daily With 60mg capsule for total dose of 90mg   DULoxetine (CYMBALTA) 60 MG EC capsule Take 1 capsule (60 mg) by mouth daily With 30mg capsule for total dose of 90mg   furosemide (LASIX) 20 MG tablet Take 1 tablet (20 mg) by mouth 2 times daily   hydrocortisone (CORTEF) 10 MG tablet Take 10 mg in the morning and 10 mg at bedtime. Watch for hypoglycemia recurrence.   insulin aspart (NOVOPEN ECHO) 100 UNIT/ML cartridge Humalog 0.5 units per 15 grams carbohydrate  Correction scale of 0.5 units for every 50 points above 150 mg/dL.  Up to  10 units a day   insulin degludec (TRESIBA FLEXTOUCH) 100 UNIT/ML pen Take 8 units daily   levothyroxine (SYNTHROID/LEVOTHROID) 112 MCG tablet Take 1 tablet (112 mcg) by mouth daily   linaclotide (LINZESS) 145 MCG capsule Take 1 capsule (145 mcg) by mouth every morning (before breakfast) (Patient taking differently: Take 1 capsule by mouth every morning before breakfast as needed.)   metoclopramide (REGLAN) 5 MG tablet Take 2 tablets (10 mg) by mouth 3 times daily   ondansetron (ZOFRAN-ODT) 4 MG ODT tab DISSOLVE ONE TABLET ON THE TONGUE EVERY 6 HOURS AS NEEDED FOR NAUSEA AND VOMITING   pantoprazole (PROTONIX) 40 MG EC tablet Take 1 tablet (40 mg) by mouth 2 times daily   polyethylene glycol (MIRALAX/GLYCOLAX) packet Take 1 packet by mouth 2 times daily as needed for constipation    potassium chloride SA (K-DUR/KLOR-CON M) 20 MEQ CR tablet Take 1 tablet (20 mEq) by mouth daily   senna-docusate (SENOKOT-S/PERICOLACE) 8.6-50 MG tablet Take 1-2 tablets by mouth daily as needed for constipation   sodium chloride (OCEAN) 0.65 % nasal spray Spray 1 spray into both nostrils daily as needed for congestion   SUMAtriptan (IMITREX) 50 MG tablet Take 1 tablet (50 mg) by mouth at onset of headache for migraine Take 1 Tab by mouth Once as needed for Migraine Headache. May repeat after two hours.  Maximum dose 200 mg/24 hours.   topiramate (TOPAMAX) 100 MG tablet Take 1 tablet (100 mg) by mouth 2 times daily   traZODone (DESYREL) 100 MG tablet TAKE 1-2 TABLETS BY MOUTH AN HOUR BEFORE BEDTIME   ACCU-CHEK MULTICLIX LANCETS MISC by Lancet route. Use to test blood sugar daily or as directed.   acetone urine (KETOSTIX) test strip Test ketones if BG >350 mg/dL   blood glucose monitoring (NOEMI CONTOUR NEXT) test strip Use to test blood sugar 8 times daily.   blood glucose monitoring (NO BRAND SPECIFIED) test strip Use to test blood glucoses 6-8 times per day.   Injection Device for insulin (NOVOPEN ECHO) RICARDO Use with   Insulin   insulin  pen needle (31G X 5 MM) 31G X 5 MM miscellaneous As needed for Insulin injections.   Nutritional Supplements (BOOST HIGH PROTEIN) LIQD After above baseline labs are drawn, give: 6 mL/kg to maximum of 360 mL; the beverage is to be consumed within 5 minutes.   order for DME by [facility] T[facility] route [facility]: [phone]: [phone] 1.5 @ 10 ml/hr with advancement by 10 ml/hr q 8 hours to goal rate of 40 ml/hr.  This will provide 1440 kcals (27 kcal/kg/day), 65 g PRO (1.2 g/kg/day), 730 mL H2O, 169 g CHO and no Fiber daily.    pregabalin (LYRICA) 150 MG capsule Take 1 capsule (150 mg) by mouth 3 times daily   Urine Glucose-Ketones Test STRP 1 strip by In Vitro route 3 times daily Test urine 3 times a day.       Allergies      Allergies   Allergen Reactions     Corticosteroids Other (See Comments)     All oral,IV and injectable steroids are contraindicated (unless in life threatening situations) in Islet Auto transplant recipients. They can cause irreversible loss of islet cell function. Please contact patients transplant care coordinator Malini ORDAZ @[phone]/Pager [phone]  and Endocrinologist prior to administration.     Chocolate Flavor Rash     Breaks out when eats chocolate       Data: Review in Epic.

## 2019-06-10 NOTE — ED PROVIDER NOTES
History     Chief Complaint   Patient presents with     Melena     HPI  Chantell Kidd is a 53 year old female with a history of chronic abdominal pain s/p pancreatectomy w/ autoislet transplant (2012), appendectomy, cholecystectomy; type 1 diabetes; adrenal insufficiency, and iron deficiency anemia who presents to the Emergency Department for evaluation of lower abdominal pain, dark bloody stools, nausea, and coffee ground emesis. Patient reports she began feeling a intermittent aching lower abdominal pain that radiated across her lower back 3 nights ago. She reports that it gradually worsened since then with increasing frequency of episodes. She states she has been taking acetaminophen and resting, but the pain has not lessened. She also reports that she had melena beginning 3 nights ago, with 4-5 episodes 2 days ago, and 2-3 episodes yesterday. She describes the melena as dark, tarry, and that blood in her stool is a darker red. She reports seeing blood in vomit and describes it as dark coffee grounds with thin clots. She also complains of loss of appetite, fatigue, and a fever of 102.5  F,  2 days ago which she took acetaminophen for. She reports taking Protonix to help with acid reflux, and hydrocortisone to help with adrenal insufficiency. She denies taking aspirin or NSAIDs on a regular basis, denies a history of ulcers, denies prior bleeding issues, denies dysuria or abnormal blood sugar levels, and denies taking anti-coagulants.      I have reviewed the Medications, Allergies, Past Medical and Surgical History, and Social History in the [employer] system.  Past Medical History:   Diagnosis Date     Chronic abdominal pain      Chronic pancreatitis (H)     S/P pancreatectomy     Depression with anxiety      Diabetes mellitus (H) 1/2012     Gastro-oesophageal reflux disease      Hypothyroidism 4/23/2015     Kidney stones      Low serum cortisol level (H)      Migraines      Other chronic pain     STOMACH     Other  chronic pain     LUMBAR SPINE     Spasm of sphincter of Oddi        Past Surgical History:   Procedure Laterality Date     ARTHROPLASTY CARPOMETACARPAL (THUMB JOINT)  5/2/2014    Procedure: ARTHROPLASTY CARPOMETACARPAL (THUMB JOINT);  Surgeon: Carina Panda MD;  Location: [facility]     CHOLECYSTECTOMY  2004     COLONOSCOPY  7/18/2014    Procedure: COLONOSCOPY;  Surgeon: Aurora Sahu MD;  Location: [facility]     COLONOSCOPY N/A 8/1/2017    Procedure: COLONOSCOPY;  Colonoscopy and upper endoscopy;  Surgeon: Deirdre Harris MD;  Location: [facility]     ENDOSCOPIC RETROGRADE CHOLANGIOPANCREATOGRAM       ENDOSCOPIC RETROGRADE CHOLANGIOPANCREATOGRAM  4/19/2011    Procedure:ENDOSCOPIC RETROGRADE CHOLANGIOPANCREATOGRAM; Pancreatic Stent Placement       ENDOSCOPIC RETROGRADE CHOLANGIOPANCREATOGRAM  5/26/2011    Procedure:ENDOSCOPIC RETROGRADE CHOLANGIOPANCREATOGRAM; with Pancreatic Stent Removal; Surgeon:DALE MIMS; Location[facility]     ENDOSCOPY UPPER, COLONOSCOPY, COMBINED  4/25/2012    Procedure:COMBINED ENDOSCOPY UPPER, COLONOSCOPY; Enteroscopy with Bile Duct Stent Removal, Colonoscopy  *[facility] Safe Room*; Surgeon:GRACY GODWINIQ; [facility]     ESOPHAGOSCOPY, GASTROSCOPY, DUODENOSCOPY (EGD), COMBINED  5/26/2011    Procedure:COMBINED ESOPHAGOSCOPY, GASTROSCOPY, DUODENOSCOPY (EGD); Surgeon:DALE MIMS; [facility]     ESOPHAGOSCOPY, GASTROSCOPY, DUODENOSCOPY (EGD), COMBINED N/A 10/30/2014    Procedure: COMBINED ESOPHAGOSCOPY, GASTROSCOPY, DUODENOSCOPY (EGD), BIOPSY SINGLE OR MULTIPLE;  Surgeon: Sarai Moon MD;  [facility]     ESOPHAGOSCOPY, GASTROSCOPY, DUODENOSCOPY (EGD), COMBINED Left 7/6/2015    Procedure: COMBINED ESOPHAGOSCOPY, GASTROSCOPY, DUODENOSCOPY (EGD), BIOPSY SINGLE OR MULTIPLE;  Surgeon: Thomas Estrada MD;  [facility]     ESOPHAGOSCOPY, GASTROSCOPY, DUODENOSCOPY (EGD), COMBINED N/A 7/8/2016    Procedure: COMBINED  ESOPHAGOSCOPY, GASTROSCOPY, DUODENOSCOPY (EGD), BIOPSY SINGLE OR MULTIPLE;  Surgeon: Eloy Klein MD;  [facility]     ESOPHAGOSCOPY, GASTROSCOPY, DUODENOSCOPY (EGD), COMBINED N/A 8/4/2016    Procedure: COMBINED ESOPHAGOSCOPY, GASTROSCOPY, DUODENOSCOPY (EGD), BIOPSY SINGLE OR MULTIPLE;  Surgeon: Jason Brown MD;  [facility]     ESOPHAGOSCOPY, GASTROSCOPY, DUODENOSCOPY (EGD), COMBINED N/A 8/1/2017    Procedure: COMBINED ESOPHAGOSCOPY, GASTROSCOPY, DUODENOSCOPY (EGD);;  Surgeon: Deirdre Harris MD;  [facility]     GYN SURGERY      Hysterectomy and USO     [facility] ENDOSCOPY W EUS  7/20/2011    Procedure:COMBINED ENDOSCOPIC ULTRASOUND, ESOPHAGOSCOPY, GASTROSCOPY, DUODENOSCOPY (EGD); Surgeon:DARVIN DONOHUE; [facility]     HERNIORRHAPHY VENTRAL N/A 9/15/2016    Procedure: HERNIORRHAPHY VENTRAL;  Surgeon: Juanita Bernabe MD;  [facility]     HYSTERECTOMY  1997 or 1998    USO     INCISION AND DRAINAGE ABDOMEN WASHOUT, COMBINED  8/16/2012    Procedure: COMBINED INCISION AND DRAINAGE ABDOMEN WASHOUT;  ,debridement and Drainage Post Appendectomy;  Surgeon: Ron Austin MD;  [facility]     INJECT TRANSVERSUS ABDOMINIS PLANE (TAP) BLOCK BILATERAL Bilateral 5/26/2016    Procedure: INJECT TRANSVERSUS ABDOMINIS PLANE (TAP) BLOCK BILATERAL;  Surgeon: Leonard Mccallum MD;  [facility]     LAPAROSCOPIC APPENDECTOMY  7/30/2012    Procedure: LAPAROSCOPIC APPENDECTOMY;  Open Appendectomy;  Surgeon: Ron Austin MD;  [facility]     PANCREATECTOMY, TRANSPLANT AUTO ISLET CELL, COMBINED  1/6/2012    Procedure:COMBINED PANCREATECTOMY, TRANSPLANT AUTO ISLET CELL; Total  Pancreatectomy, Auto Islet Transplant, splenectomy, 18fr. transgastric-jejunal feeding tube placement, liver biopsy; Surgeon:PALAK LEE; [facility]     REPLACE GASTROSTOMY TUBE, PERCUTANEOUS N/A 8/30/2017    Procedure: REPLACE GASTROSTOMY TUBE, PERCUTANEOUS;  GJ Tube  Change;  Surgeon: Jose Nath PA-C;  [facility]     SPLENECTOMY         Family History   Problem Relation Age of Onset     Hypertension Mother      Diabetes Mother      Osteoporosis Mother      Cancer Father         pancreatic cancer     Diabetes Maternal Grandmother      Cardiovascular Maternal Grandmother      Cancer Maternal Grandfather         lung cancer     Cancer Sister         brain     Cancer Sister         liver cancer       Social History     Tobacco Use     Smoking status: Never Smoker     Smokeless tobacco: Never Used   Substance Use Topics     Alcohol use: No     Alcohol/week: 0.0 oz     Current Facility-Administered Medications   Medication     acetaminophen (TYLENOL) tablet 650 mg     alum & mag hydroxide-simethicone (MYLANTA/MAALOX) 200-200-25 MG chewable tablet 1 tablet     amylase-lipase-protease (CREON 12) 82772 units per capsule 72,000-96,000 Units     cyclobenzaprine (FLEXERIL) tablet 5 mg     glucose gel 15-30 g    Or     dextrose 50 % injection 25-50 mL    Or     glucagon injection 1 mg     diclofenac (VOLTAREN) 1 % topical gel 2 g     dicyclomine (BENTYL) capsule 10 mg     dronabinol (MARINOL) capsule 5 mg     [START ON 6/11/2019] DULoxetine (CYMBALTA) DR capsule 90 mg     furosemide (LASIX) tablet 20 mg     hydrocortisone (CORTEF) tablet 10 mg     HYDROmorphone (PF) (DILAUDID) injection 0.5 mg     insulin degludec (TRESIBA) 100 UNIT/ML injection 4 Units     levothyroxine (SYNTHROID/LEVOTHROID) tablet 112 mcg     melatonin tablet 1 mg     metoclopramide (REGLAN) tablet 10 mg    Or     metoclopramide (REGLAN) injection 10 mg     metoclopramide (REGLAN) tablet 10 mg     naloxone (NARCAN) injection 0.1-0.4 mg     ondansetron (ZOFRAN-ODT) ODT tab 4 mg    Or     ondansetron (ZOFRAN) injection 4 mg     pantoprazole (PROTONIX) EC tablet 40 mg     potassium chloride ER (K-DUR/KLOR-CON M) CR tablet 20 mEq     pregabalin (LYRICA) capsule 150 mg     prochlorperazine (COMPAZINE)  injection 10 mg    Or     prochlorperazine (COMPAZINE) tablet 10 mg    Or     prochlorperazine (COMPAZINE) Suppository 25 mg     sodium chloride (OCEAN) 0.65 % nasal spray 1 spray     SUMAtriptan (IMITREX) tablet 50 mg     topiramate (TOPAMAX) tablet 100 mg     traZODone (DESYREL) tablet 100-200 mg        Allergies   Allergen Reactions     Corticosteroids Other (See Comments)     All oral,IV and injectable steroids are contraindicated (unless in life threatening situations) in Islet Auto transplant recipients. They can cause irreversible loss of islet cell function. Please contact patients transplant care coordinator Malini Julien RN BSN @[phone]/Pager [phone]  and Endocrinologist prior to administration.     Chocolate Flavor Rash     Breaks out when eats chocolate         Review of Systems   Constitutional: Positive for appetite change ( loss of appetite), fatigue and fever.   Respiratory: Negative for cough and shortness of breath.    Cardiovascular: Negative for chest pain.   Gastrointestinal: Positive for abdominal pain ( upper), blood in stool ( melena), diarrhea, nausea and vomiting. Negative for abdominal distention and constipation.   Genitourinary: Negative for dysuria.   Allergic/Immunologic: Positive for immunocompromised state.        Increased bruising   All other systems reviewed and are negative.      Physical Exam   BP: 135/78  Pulse: 78  Heart Rate: 67  Temp: 98.3  F (36.8  C)  Resp: 16  Weight: 62.8 kg (138 lb 8 oz)  SpO2: 100 %      Physical Exam   Constitutional: She is oriented to person, place, and time. She appears well-developed and well-nourished. No distress.   HENT:   Head: Normocephalic and atraumatic.   Nose: Nose normal.   Mouth/Throat: Mucous membranes are dry.   Eyes: Conjunctivae are normal. No scleral icterus.   Neck: Normal range of motion. Neck supple.   Cardiovascular: Normal rate, regular rhythm, normal heart sounds and intact distal pulses.   Pulmonary/Chest: Effort  normal and breath sounds normal. No stridor. No respiratory distress. She has no wheezes. She has no rales.   Abdominal: Soft. Bowel sounds are normal. She exhibits no distension and no mass. There is tenderness in the right lower quadrant, periumbilical area, suprapubic area and left lower quadrant. There is guarding. There is no rigidity and no rebound. No hernia.   Genitourinary: Rectal exam shows guaiac positive stool.   Genitourinary Comments: Rectal exam nontender without internal mass or tenderness. No external hemorrhoids or anal fissures. Light-brown, formed, soft stool in rectal vault. Faintly guiac positive. No gross blood or melena.    Musculoskeletal: Normal range of motion. She exhibits no deformity.   Neurological: She is alert and oriented to person, place, and time.   Skin: Skin is warm and dry. No rash noted. She is not diaphoretic. There is pallor.   Psychiatric: She has a normal mood and affect. Her behavior is normal. Thought content normal.   Nursing note and vitals reviewed.      ED Course        Procedures             Critical Care time:  none  The Lactic acid level is elevated due to hypovolemia, at this time there is no sign of severe sepsis or septic shock. Cultures sent.       Labs Ordered and Resulted from Time of ED Arrival Up to the Time of Departure from the ED   CBC WITH PLATELETS DIFFERENTIAL - Abnormal; Notable for the following components:       Result Value    RBC Count 3.64 (*)     Hemoglobin 10.8 (*)     Hematocrit 33.8 (*)     All other components within normal limits   COMPREHENSIVE METABOLIC PANEL - Abnormal; Notable for the following components:    Glucose 219 (*)     ALT 59 (*)     AST 61 (*)     All other components within normal limits   LIPASE - Abnormal; Notable for the following components:    Lipase 21 (*)     All other components within normal limits   LACTIC ACID WHOLE BLOOD - Abnormal; Notable for the following components:    Lactic Acid 2.6 (*)     All other  components within normal limits   ROUTINE UA WITH MICROSCOPIC REFLEX TO CULTURE - Abnormal; Notable for the following components:    Glucose Urine 150 (*)     Specific Gravity Urine 1.002 (*)     Leukocyte Esterase Urine Moderate (*)     Bacteria Urine Few (*)     Mucous Urine Present (*)     All other components within normal limits   HCG QUAL URINE POCT - Normal   INR   PARTIAL THROMBOPLASTIN TIME   CORTISOL   MAGNESIUM   PHOSPHORUS   PERIPHERAL IV CATHETER   CARDIAC CONTINUOUS MONITORING   PATIENT CARE ORDER   ABO/RH TYPE AND SCREEN   URINE CULTURE AEROBIC BACTERIAL   CYTOMEGALOVIRUS QUALITATIVE PCR NON BLOOD   ENTERIC BACTERIA AND VIRUS PANEL BY CYNTHIA STOOL   CYTOMEGALOVIRUS QUALITATIVE PCR          CT Abdomen Pelvis w Contrast   Final Result   IMPRESSION:    1. Nonspecific findings suggestive of enterocolitis including mild   hyperemia of the distal small bowel and wall edema of the rectosigmoid   colon. No evidence of obstruction. No drainable intra-abdominal fluid   collections identified.   2. Extensive postsurgical changes of the abdomen detailed above.   3. Moderate bladder distention. Nonspecific but suggestive of urinary   retention.       I have personally reviewed the examination and initial interpretation   and I agree with the findings.      JIM PARTIDA MD          Assessments & Plan (with Medical Decision Making)   Chantell Kidd is a 53 year old female with a history of chronic abdominal pain s/p pancreatectomy w/ autoislet transplant (2012), appendectomy, cholecystectomy; type 1 diabetes; adrenal insufficiency, and iron deficiency anemia who presents to the Emergency Department for evaluation of lower abdominal pain, dark bloody stools, nausea, and coffee ground emesis.     Ddx: UGIB, PUD, marginal ulcer, acute blood loss anemia, mesenteric ischemia    Hgb 10.8 from 12.6 in April. Unclear if this reflects any acute blood loss. Lactate elevated. CT shows small bowel hyperemia and rectosigmoid edema.  Discussed with GI pancreas and luminal. Recommend checking blood CMV. Will admit to internal med for pain control and monitoring.     I have reviewed the nursing notes.    I have reviewed the findings, diagnosis, plan and need for follow up with the patient.       Medication List      There are no discharge medications for this visit.         Final diagnoses:   Abdominal pain     INe, am serving as a trained medical scribe to document services personally performed by Caroline Michaud MD, based on the provider's statements to me.   Caroline OLSEN MD, was physically present and have reviewed and verified the accuracy of this note documented by Ne Eldridge.    6/10/2019   [facility], [place], EMERGENCY DEPARTMENT     Caroline Michaud MD  06/10/19 2218

## 2019-06-10 NOTE — PHARMACY-ADMISSION MEDICATION HISTORY
Admission medication history interview status for the 6/10/2019 admission is complete. See Epic admission navigator for allergy information, pharmacy, prior to admission medications and immunization status.     Medication history interview sources:  Patient, [facility], and     Changes made to PTA medication list (reason)  Added: None  Deleted:   -Canagliflozin (no longer taking per patient. Last time she filled it was 09/2018 per [facility])  -Fluconazole (no longer taking per patient. Was only for 14 day course)  -Clotrimazole (last fill history was a for a 17-day supply back in 09/2018 per [facility])  -Nystatin (no longer taking per patient)  Changed:   -Dicyclomine 10 mg capsules: 1 capsule every 6 hours PRN --> 1 capsule every 6 hours (per patient, she takes this medication scheduled)  -Linzess 145 mcg capsule: 1 capsule every morning --> 1 capsule every morning PRN (per patient, she now takes as needed and not every day)    Additional medication history information (including reliability of information, actions taken by pharmacist):  -Patient was able to answer questions regarding her medication list with good reliability.   -Patient uses an insulin pump. She also reports she gives herself an injection of 8 units of Tresiba every morning. Her last dose of her Tresiba was yesterday morning.   -Patient did not know her dose of Lyrica when interviewed. She had no fill history per . Per patient, she goes directly through the drug  to obtain Lyrica as her insurance doesn't cover it. Patient called her  at home who verified per her prescription bottle that she takes 150 mg of Lyrica three times daily.  -Patient reports she had a migraine last week requiring her to use her sumatriptan. She reports she used a total of 3 doses of sumatriptan for her migraine.       Prior to Admission medications    Medication Sig Last Dose Taking? Auth Provider   acetaminophen 500 MG CAPS Take 1,000 mg  by mouth three times a day as needed. 6/10/2019 at AM - 2 tabs Yes Reported, Patient   alendronate (FOSAMAX) 70 MG tablet Take 1 tablet (70 mg) by mouth every 7 days On Sundays take first thing in the morning with plain water and remain upright for at least 30 minutes and until after first food of the day    Do not restart Fosamax (alendronate) until your difficulty swallowing has resolved and you have finished the entire course of fluconazole (Diflucan). 6/9/2019 at AM Yes Kendall Owen MD   alum & mag hydroxide-simethicone (MYLANTA/MAALOX) 200-200-25 MG CHEW chewable tablet Take 1 tablet by mouth 3 times daily as needed for indigestion Past Week at PRN Yes Unknown, Entered By History   amylase-lipase-protease (CREON 12) 84553 units CPEP Take 6 to 8 capsules by mouth with meals and take 4 capsules with snacks. Needs up to 25 capsules per day 6/9/2019 at Unknown time Yes Amena Maher MD   aspirin 81 MG tablet Take 81 mg by mouth daily. 6/9/2019 at AM Yes Reported, Patient   cyclobenzaprine (FLEXERIL) 5 MG tablet Take 1 tablet (5 mg) by mouth 3 times daily as needed for muscle spasms 6/9/2019 at PM Yes Ron Morgan MD   diclofenac (FLECTOR) 1.3 % Patch Place 1 patch onto the skin 2 times daily Past Week at Unknown time Yes Leonard Millard MD   diclofenac (VOLTAREN) 1 % GEL 2 g Apply 2 g to skin four times a day as needed (to affected upper extremity joint(s)). Maximum 8g/day per joint, 16g/day total. 6/8/2019 at PM Yes Reported, Patient   dicyclomine (BENTYL) 10 MG capsule Take 10 mg by mouth every 6 hours 6/9/2019 at Unknown time Yes Unknown, Entered By History   dronabinol (MARINOL) 2.5 MG capsule Take 2 capsules (5 mg) by mouth 2 times daily as needed 6/9/2019 at AM - dose 1 of 2 Yes Ron Morgan MD   DULoxetine (CYMBALTA) 30 MG capsule Take 1 capsule (30 mg) by mouth daily With 60mg capsule for total dose of 90mg 6/9/2019 at AM Yes Ron Morgan MD   DULoxetine  (CYMBALTA) 60 MG EC capsule Take 1 capsule (60 mg) by mouth daily With 30mg capsule for total dose of 90mg 6/9/2019 at AM Yes Rno Morgan MD   furosemide (LASIX) 20 MG tablet Take 1 tablet (20 mg) by mouth 2 times daily 6/9/2019 at AM - dose 1 of 2 Yes Ron Morgan MD   hydrocortisone (CORTEF) 10 MG tablet Take 10 mg in the morning and 10 mg at bedtime. Watch for hypoglycemia recurrence. 6/9/2019 at AM - dose 1 of 2 Yes Amena Maher MD   insulin aspart (NOVOPEN ECHO) 100 UNIT/ML cartridge Humalog 0.5 units per 15 grams carbohydrate      Correction scale of 0.5 units for every 50 points above 150 mg/dL.      Up to 10 units a day Patient has pump Yes Amena Maher MD   insulin degludec (TRESIBA FLEXTOUCH) 100 UNIT/ML pen Take 8 units daily 6/9/2019 at AM Yes Amena Maher MD   levothyroxine (SYNTHROID/LEVOTHROID) 112 MCG tablet Take 1 tablet (112 mcg) by mouth daily 6/9/2019 at AM Yes Ron Morgan MD   linaclotide (LINZESS) 145 MCG capsule Take 1 capsule (145 mcg) by mouth every morning (before breakfast)  Patient taking differently: Take 1 capsule by mouth every morning before breakfast as needed. Past Month at PRN Yes Ron Morgan MD   metoclopramide (REGLAN) 5 MG tablet Take 2 tablets (10 mg) by mouth 3 times daily 6/9/2019 at Noon - dose 2 of 3 Yes Ron Morgan MD   ondansetron (ZOFRAN-ODT) 4 MG ODT tab DISSOLVE ONE TABLET ON THE TONGUE EVERY 6 HOURS AS NEEDED FOR NAUSEA AND VOMITING 6/9/2019 at Unknown time Yes Ron Morgan MD   pantoprazole (PROTONIX) 40 MG EC tablet Take 1 tablet (40 mg) by mouth 2 times daily 6/9/2019 at AM - dose 1 of 2 Yes Ron Morgan MD   polyethylene glycol (MIRALAX/GLYCOLAX) packet Take 1 packet by mouth 2 times daily as needed for constipation  Past Week at PRN Yes Rosalee Dee MD   potassium chloride SA (K-DUR/KLOR-CON M) 20 MEQ CR tablet Take 1 tablet (20 mEq) by  mouth daily 6/9/2019 at AM Yes Ron Morgan MD   pregabalin (LYRICA) 150 MG capsule Take 1 capsule (150 mg) by mouth 3 times daily 6/9/2019 at PM Yes Ron Morgan MD   senna-docusate (SENOKOT-S/PERICOLACE) 8.6-50 MG tablet Take 1-2 tablets by mouth daily as needed for constipation Past Week at PRN Yes Ron Morgan MD   sodium chloride (OCEAN) 0.65 % nasal spray Spray 1 spray into both nostrils daily as needed for congestion Past Month at PRN Yes Delilah Orosco APRN CNP   SUMAtriptan (IMITREX) 50 MG tablet Take 1 tablet (50 mg) by mouth at onset of headache for migraine Take 1 Tab by mouth Once as needed for Migraine Headache. May repeat after two hours.  Maximum dose 200 mg/24 hours. Past Week at Unknown time Yes Ron Morgan MD   topiramate (TOPAMAX) 100 MG tablet Take 1 tablet (100 mg) by mouth 2 times daily 6/9/2019 at AM - dose 1 of 2 Yes Ron Morgan MD   traZODone (DESYREL) 100 MG tablet TAKE 1-2 TABLETS BY MOUTH AN HOUR BEFORE BEDTIME 6/8/2019 at PM Yes Ron Morgan MD   Injection Device for insulin (NOVOPEN ECHO) RICARDO Use with   Insulin Not a med at   Amena Maher MD   Nutritional Supplements (BOOST HIGH PROTEIN) LIQD After above baseline labs are drawn, give: 6 mL/kg to maximum of 360 mL; the beverage is to be consumed within 5 minutes. Unknown at Unknown time  Amena Maher MD   order for DME by [address] [place], [place]  Ph: [phone]  Fax: [phone]    Nutren 1.5 @ 10 ml/hr with advancement by 10 ml/hr q 8 hours to goal rate of 40 ml/hr.  This will provide 1440 kcals (27 kcal/kg/day), 65 g PRO (1.2 g/kg/day), 730 mL H2O, 169 g CHO and no Fiber daily.    Not a med at   Leonard Millard MD       Medication history completed by: Jordy Butler, Pharm.D. IV Student

## 2019-06-10 NOTE — PLAN OF CARE
"Admitted from ED in stable condition. Patient ambulated from cart to bed. Reports some weakness/lightheadedness states \"I feel dehydrated.\" Zofran and Compazine given for c/o nausea. No emesis. IV Dilaudid given for abdominal pain. Patient declining all PO meds. Medicine cross cover aware that scheduled PO meds not given at this time. Reports last bloody stool was prior to admission today. Patient on continuous insulin pump - Medicine cross cover notified, awaiting orders to reflect. Has not voided since admission to unit.     Update:   Blood glucose spot check 34. 25 mg D5 given IV push. Patient asymptomatic except stating \"I feel tired.\"  Recheck 156. Patient paused insulin pump basal rate.     Medicine cross cover notified of event, continue to monitor.     "

## 2019-06-10 NOTE — TELEPHONE ENCOUNTER
Pt calls red flag triage reporting vomiting  blood and blood in stool. Last ate 3:30pm yesterday, vomited after eating. none since. Blood in stool dark, red, maroon color. Was advised yesterday to go to ER, she thought she would get better and did not go. Feels weak, dizzy. Is drinking water. Asks for clinic appt today. Advised ER now. States she will go now. Her  will drive her now.    DIOMEDES Cutler tele triage  Vomiting, adult  Pg [phone].

## 2019-06-11 LAB
ALBUMIN SERPL-MCNC: 3.2 G/DL (ref 3.4–5)
ALP SERPL-CCNC: 90 U/L (ref 40–150)
ALT SERPL W P-5'-P-CCNC: 44 U/L (ref 0–50)
ANION GAP SERPL CALCULATED.3IONS-SCNC: 5 MMOL/L (ref 3–14)
AST SERPL W P-5'-P-CCNC: 37 U/L (ref 0–45)
BACTERIA SPEC CULT: NORMAL
BILIRUB SERPL-MCNC: 1 MG/DL (ref 0.2–1.3)
BUN SERPL-MCNC: 6 MG/DL (ref 7–30)
CALCIUM SERPL-MCNC: 8.7 MG/DL (ref 8.5–10.1)
CHLORIDE SERPL-SCNC: 107 MMOL/L (ref 94–109)
CO2 SERPL-SCNC: 28 MMOL/L (ref 20–32)
CREAT SERPL-MCNC: 0.82 MG/DL (ref 0.52–1.04)
ERYTHROCYTE [DISTWIDTH] IN BLOOD BY AUTOMATED COUNT: 14.2 % (ref 10–15)
GFR SERPL CREATININE-BSD FRML MDRD: 81 ML/MIN/{1.73_M2}
GLUCOSE BLDC GLUCOMTR-MCNC: 118 MG/DL (ref 70–99)
GLUCOSE BLDC GLUCOMTR-MCNC: 120 MG/DL (ref 70–99)
GLUCOSE BLDC GLUCOMTR-MCNC: 130 MG/DL (ref 70–99)
GLUCOSE BLDC GLUCOMTR-MCNC: 130 MG/DL (ref 70–99)
GLUCOSE BLDC GLUCOMTR-MCNC: 131 MG/DL (ref 70–99)
GLUCOSE BLDC GLUCOMTR-MCNC: 138 MG/DL (ref 70–99)
GLUCOSE BLDC GLUCOMTR-MCNC: 139 MG/DL (ref 70–99)
GLUCOSE BLDC GLUCOMTR-MCNC: 148 MG/DL (ref 70–99)
GLUCOSE BLDC GLUCOMTR-MCNC: 148 MG/DL (ref 70–99)
GLUCOSE BLDC GLUCOMTR-MCNC: 150 MG/DL (ref 70–99)
GLUCOSE BLDC GLUCOMTR-MCNC: 151 MG/DL (ref 70–99)
GLUCOSE BLDC GLUCOMTR-MCNC: 162 MG/DL (ref 70–99)
GLUCOSE BLDC GLUCOMTR-MCNC: 169 MG/DL (ref 70–99)
GLUCOSE SERPL-MCNC: 142 MG/DL (ref 70–99)
HBA1C MFR BLD: 8.2 % (ref 0–5.6)
HBA1C MFR BLD: NORMAL % (ref 0–5.6)
HCT VFR BLD AUTO: 30.1 % (ref 35–47)
HGB BLD-MCNC: 10.1 G/DL (ref 11.7–15.7)
HGB BLD-MCNC: 9.5 G/DL (ref 11.7–15.7)
Lab: NORMAL
MCH RBC QN AUTO: 30.2 PG (ref 26.5–33)
MCHC RBC AUTO-ENTMCNC: 31.6 G/DL (ref 31.5–36.5)
MCV RBC AUTO: 96 FL (ref 78–100)
PLATELET # BLD AUTO: 290 10E9/L (ref 150–450)
POTASSIUM SERPL-SCNC: 3.6 MMOL/L (ref 3.4–5.3)
PROT SERPL-MCNC: 6 G/DL (ref 6.8–8.8)
RBC # BLD AUTO: 3.15 10E12/L (ref 3.8–5.2)
SODIUM SERPL-SCNC: 140 MMOL/L (ref 133–144)
SPECIMEN SOURCE: NORMAL
WBC # BLD AUTO: 7.2 10E9/L (ref 4–11)

## 2019-06-11 PROCEDURE — 25000128 H RX IP 250 OP 636: Performed by: STUDENT IN AN ORGANIZED HEALTH CARE EDUCATION/TRAINING PROGRAM

## 2019-06-11 PROCEDURE — 80053 COMPREHEN METABOLIC PANEL: CPT | Performed by: STUDENT IN AN ORGANIZED HEALTH CARE EDUCATION/TRAINING PROGRAM

## 2019-06-11 PROCEDURE — 36415 COLL VENOUS BLD VENIPUNCTURE: CPT | Performed by: STUDENT IN AN ORGANIZED HEALTH CARE EDUCATION/TRAINING PROGRAM

## 2019-06-11 PROCEDURE — 00000146 ZZHCL STATISTIC GLUCOSE BY METER IP

## 2019-06-11 PROCEDURE — A9270 NON-COVERED ITEM OR SERVICE: HCPCS | Mod: GY | Performed by: STUDENT IN AN ORGANIZED HEALTH CARE EDUCATION/TRAINING PROGRAM

## 2019-06-11 PROCEDURE — 25000132 ZZH RX MED GY IP 250 OP 250 PS 637: Mod: GY | Performed by: HOSPITALIST

## 2019-06-11 PROCEDURE — 99233 SBSQ HOSP IP/OBS HIGH 50: CPT | Performed by: INTERNAL MEDICINE

## 2019-06-11 PROCEDURE — A9270 NON-COVERED ITEM OR SERVICE: HCPCS | Mod: GY | Performed by: HOSPITALIST

## 2019-06-11 PROCEDURE — 85018 HEMOGLOBIN: CPT | Performed by: STUDENT IN AN ORGANIZED HEALTH CARE EDUCATION/TRAINING PROGRAM

## 2019-06-11 PROCEDURE — 25000131 ZZH RX MED GY IP 250 OP 636 PS 637: Mod: GY | Performed by: STUDENT IN AN ORGANIZED HEALTH CARE EDUCATION/TRAINING PROGRAM

## 2019-06-11 PROCEDURE — 83036 HEMOGLOBIN GLYCOSYLATED A1C: CPT | Performed by: STUDENT IN AN ORGANIZED HEALTH CARE EDUCATION/TRAINING PROGRAM

## 2019-06-11 PROCEDURE — 85027 COMPLETE CBC AUTOMATED: CPT | Performed by: STUDENT IN AN ORGANIZED HEALTH CARE EDUCATION/TRAINING PROGRAM

## 2019-06-11 PROCEDURE — 25000132 ZZH RX MED GY IP 250 OP 250 PS 637: Mod: GY | Performed by: STUDENT IN AN ORGANIZED HEALTH CARE EDUCATION/TRAINING PROGRAM

## 2019-06-11 PROCEDURE — 25000132 ZZH RX MED GY IP 250 OP 250 PS 637: Mod: GY

## 2019-06-11 PROCEDURE — 12000001 ZZH R&B MED SURG/OB UMMC

## 2019-06-11 RX ORDER — SUCRALFATE 1 G/1
1 TABLET ORAL
Status: DISCONTINUED | OUTPATIENT
Start: 2019-06-11 | End: 2019-06-14 | Stop reason: HOSPADM

## 2019-06-11 RX ORDER — HYDROCORTISONE 10 MG/1
20 TABLET ORAL EVERY MORNING
Status: DISCONTINUED | OUTPATIENT
Start: 2019-06-12 | End: 2019-06-14 | Stop reason: HOSPADM

## 2019-06-11 RX ORDER — HYDROCORTISONE 10 MG/1
10 TABLET ORAL EVERY EVENING
Status: DISCONTINUED | OUTPATIENT
Start: 2019-06-11 | End: 2019-06-14 | Stop reason: HOSPADM

## 2019-06-11 RX ADMIN — TOPIRAMATE 100 MG: 100 TABLET, FILM COATED ORAL at 22:36

## 2019-06-11 RX ADMIN — SUCRALFATE 1 G: 1 TABLET ORAL at 16:09

## 2019-06-11 RX ADMIN — TOPIRAMATE 100 MG: 100 TABLET, FILM COATED ORAL at 09:44

## 2019-06-11 RX ADMIN — HYDROMORPHONE HYDROCHLORIDE 0.5 MG: 1 INJECTION, SOLUTION INTRAMUSCULAR; INTRAVENOUS; SUBCUTANEOUS at 03:34

## 2019-06-11 RX ADMIN — HYDROMORPHONE HYDROCHLORIDE 0.5 MG: 1 INJECTION, SOLUTION INTRAMUSCULAR; INTRAVENOUS; SUBCUTANEOUS at 08:38

## 2019-06-11 RX ADMIN — PREGABALIN 150 MG: 75 CAPSULE ORAL at 22:34

## 2019-06-11 RX ADMIN — METOCLOPRAMIDE 10 MG: 5 INJECTION, SOLUTION INTRAMUSCULAR; INTRAVENOUS at 08:49

## 2019-06-11 RX ADMIN — LEVOTHYROXINE SODIUM 112 MCG: 112 TABLET ORAL at 09:44

## 2019-06-11 RX ADMIN — PANTOPRAZOLE SODIUM 40 MG: 40 TABLET, DELAYED RELEASE ORAL at 08:39

## 2019-06-11 RX ADMIN — PREGABALIN 150 MG: 75 CAPSULE ORAL at 08:51

## 2019-06-11 RX ADMIN — DULOXETINE HYDROCHLORIDE 90 MG: 60 CAPSULE, DELAYED RELEASE ORAL at 08:49

## 2019-06-11 RX ADMIN — OXYCODONE HYDROCHLORIDE 5 MG: 5 TABLET ORAL at 06:28

## 2019-06-11 RX ADMIN — OXYCODONE HYDROCHLORIDE 5 MG: 5 TABLET ORAL at 21:02

## 2019-06-11 RX ADMIN — ONDANSETRON 4 MG: 4 TABLET, ORALLY DISINTEGRATING ORAL at 16:14

## 2019-06-11 RX ADMIN — PREGABALIN 150 MG: 75 CAPSULE ORAL at 14:01

## 2019-06-11 RX ADMIN — ONDANSETRON 4 MG: 2 INJECTION INTRAMUSCULAR; INTRAVENOUS at 03:38

## 2019-06-11 RX ADMIN — PROCHLORPERAZINE EDISYLATE 10 MG: 5 INJECTION INTRAMUSCULAR; INTRAVENOUS at 11:15

## 2019-06-11 RX ADMIN — METOCLOPRAMIDE 10 MG: 5 INJECTION, SOLUTION INTRAMUSCULAR; INTRAVENOUS at 02:15

## 2019-06-11 RX ADMIN — SODIUM CHLORIDE, SODIUM LACTATE, POTASSIUM CHLORIDE, CALCIUM CHLORIDE AND DEXTROSE MONOHYDRATE: 5; 600; 310; 30; 20 INJECTION, SOLUTION INTRAVENOUS at 17:20

## 2019-06-11 RX ADMIN — SUCRALFATE 1 G: 1 TABLET ORAL at 22:36

## 2019-06-11 RX ADMIN — HYDROCORTISONE 10 MG: 10 TABLET ORAL at 21:02

## 2019-06-11 RX ADMIN — METOCLOPRAMIDE HYDROCHLORIDE 10 MG: 10 TABLET ORAL at 14:01

## 2019-06-11 RX ADMIN — OXYCODONE HYDROCHLORIDE 5 MG: 5 TABLET ORAL at 02:15

## 2019-06-11 RX ADMIN — METOCLOPRAMIDE HYDROCHLORIDE 10 MG: 10 TABLET ORAL at 21:02

## 2019-06-11 RX ADMIN — HYDROCORTISONE 10 MG: 10 TABLET ORAL at 09:42

## 2019-06-11 RX ADMIN — ONDANSETRON 4 MG: 2 INJECTION INTRAMUSCULAR; INTRAVENOUS at 09:41

## 2019-06-11 RX ADMIN — PANTOPRAZOLE SODIUM 40 MG: 40 TABLET, DELAYED RELEASE ORAL at 21:02

## 2019-06-11 RX ADMIN — OXYCODONE HYDROCHLORIDE 5 MG: 5 TABLET ORAL at 16:14

## 2019-06-11 RX ADMIN — OXYCODONE HYDROCHLORIDE 5 MG: 5 TABLET ORAL at 11:13

## 2019-06-11 ASSESSMENT — ACTIVITIES OF DAILY LIVING (ADL)
ADLS_ACUITY_SCORE: 13

## 2019-06-11 ASSESSMENT — PAIN DESCRIPTION - DESCRIPTORS
DESCRIPTORS: ACHING

## 2019-06-11 NOTE — PLAN OF CARE
SLP consult received. Evaluation attempted this AM but the patient was NPO per GI service. The patient has now been advanced to a clear liquid diet per GI. SLP completed chart review and spoke with RN who reports the patient is not having any difficulty swallowing liquids. SLP was consulted due to difficulty with solid textures, so evaluation with clear liquids is not needed. SLP will f/u tomorrow to complete evaluation with more advanced textures as it becomes medically appropriate.

## 2019-06-11 NOTE — CONSULTS
GASTROENTEROLOGY CONSULTATION      Date of Admission:  6/10/2019          ASSESSMENT AND RECOMMENDATIONS:     53 year old female with a history of TPIAT (2012), MDD/anxiety and chronic abdominal pain who presented to the ED with 4 days of abdominal pain, melena and coffee ground emesis, found to have enterocolitis on imaging and a 2 g drop in hemoglobin concerning for upper GI bleed.     Currently she is HDS and abdominal pain is improving.  Hemoglobin dropped 1 more gram overnight. She denies alcohol or NSAID use, she has a history of GERD but has been taking her PPI compliantly. Endorses 2 weeks of dysphagia and odynophagia, has had candida esophagitis back in 2016 and she says her symptoms now feel similar. Will procede with EGD tomorrow given patient's stability.     Regarding the CT findings of enterocolitis, she denies diarrhea, is a febrile and doesn't have leukocytosis. Her abdominal pain is improving. Overall not concerning and recommend continue to monitor.        RECOMMENDATIONS  - start carafate 1 gram QID  - continue PPI   - NPO at midnight for EGD tomorrow, ok to give clear liquid diet (no reds) now  - please do type and screen    Thank you for involving us in this patient's care. Please do not hesitate to contact the GI service with any questions or concerns.     Patient care plan discussed with Dr. Perdomo, GI staff physician.    Judy Polo  Internal Medicine-PGY2              Chief Complaint:   Black stools, coffee ground emesis.     History is obtained from the patient and the medical record.          History of Present Illness:   Chantell Kidd is a 53 year old female with a history as below who presented to the ED with 4 days of daily 3-4 soft black stools and 1-2 episodes daily of coffee ground emesis. Has also been endorsing upper abdominal pain with some radiation to the back. No fevers or chills, no cough or chest pain. Denies heartburn and has been taking her PPI daily. She also  endorses difficulty swallowing both liquids and solids for the past 2 weeks, has been getting painful for the last 4-5 days.     ED course: workup unremarkable except for drop in hemoglobin, lipase wnl and abdomen CT with enterocolitis. Afebrile and vitally stable. Rectal exam with brown stool but guaiac positive. Received iv fluids. Dilaudid and zofran.     Had unremarkable night, abdominal pain is better this morning. Has not had a bowel movement since yesterday, denies recent emesis a well.                  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    TPIAT     Spasm of sphincter of Oddi             Past Surgical History:   Reviewed and edited as appropriate   Past Surgical History:   Procedure Laterality Date     ARTHROPLASTY CARPOMETACARPAL (THUMB JOINT)  5/2/2014    Procedure: ARTHROPLASTY CARPOMETACARPAL (THUMB JOINT);  Surgeon: Carina Panda MD;  Location: [facility]     CHOLECYSTECTOMY  2004     COLONOSCOPY  7/18/2014    Procedure: COLONOSCOPY;  Surgeon: Aurora Sahu MD;  Location: [facility]     COLONOSCOPY N/A 8/1/2017    Procedure: COLONOSCOPY;  Colonoscopy and upper endoscopy;  Surgeon: Deirdre Harris MD;  Location: [facility]     ENDOSCOPIC RETROGRADE CHOLANGIOPANCREATOGRAM       ENDOSCOPIC RETROGRADE CHOLANGIOPANCREATOGRAM  4/19/2011    Procedure:ENDOSCOPIC RETROGRADE CHOLANGIOPANCREATOGRAM; Pancreatic Stent Placement       ENDOSCOPIC RETROGRADE CHOLANGIOPANCREATOGRAM  5/26/2011    Procedure:ENDOSCOPIC RETROGRADE CHOLANGIOPANCREATOGRAM; with Pancreatic Stent  Removal; Surgeon:DALE MIMS; Location:[facility]     ENDOSCOPY UPPER, COLONOSCOPY, COMBINED  4/25/2012    Procedure:COMBINED ENDOSCOPY UPPER, COLONOSCOPY; Enteroscopy with Bile Duct Stent Removal, Colonoscopy  *[facility] Safe Room*; Surgeon:GRACY GODWIN; Location[facility]     ESOPHAGOSCOPY, GASTROSCOPY, DUODENOSCOPY (EGD), COMBINED  5/26/2011    Procedure:COMBINED ESOPHAGOSCOPY, GASTROSCOPY, DUODENOSCOPY (EGD); Surgeon:DALE MIMS; [facility]     ESOPHAGOSCOPY, GASTROSCOPY, DUODENOSCOPY (EGD), COMBINED N/A 10/30/2014    Procedure: COMBINED ESOPHAGOSCOPY, GASTROSCOPY, DUODENOSCOPY (EGD), BIOPSY SINGLE OR MULTIPLE;  Surgeon: Sarai Moon MD;  [facility]     ESOPHAGOSCOPY, GASTROSCOPY, DUODENOSCOPY (EGD), COMBINED Left 7/6/2015    Procedure: COMBINED ESOPHAGOSCOPY, GASTROSCOPY, DUODENOSCOPY (EGD), BIOPSY SINGLE OR MULTIPLE;  Surgeon: Thomas Estrada MD;  Location:  GI     ESOPHAGOSCOPY, GASTROSCOPY, DUODENOSCOPY (EGD), COMBINED N/A 7/8/2016    Procedure: COMBINED ESOPHAGOSCOPY, GASTROSCOPY, DUODENOSCOPY (EGD), BIOPSY SINGLE OR MULTIPLE;  Surgeon: Eloy Klein MD;  Location:  GI     ESOPHAGOSCOPY, GASTROSCOPY, DUODENOSCOPY (EGD), COMBINED N/A 8/4/2016    Procedure: COMBINED ESOPHAGOSCOPY, GASTROSCOPY, DUODENOSCOPY (EGD), BIOPSY SINGLE OR MULTIPLE;  Surgeon: Jason Brown MD;  Location:  GI     ESOPHAGOSCOPY, GASTROSCOPY, DUODENOSCOPY (EGD), COMBINED N/A 8/1/2017    Procedure: COMBINED ESOPHAGOSCOPY, GASTROSCOPY, DUODENOSCOPY (EGD);;  Surgeon: Deirdre Harris MD;  Location:  GI     GYN SURGERY      Hysterectomy and USO     [facility] ENDOSCOPY W EUS  7/20/2011    Procedure:COMBINED ENDOSCOPIC ULTRASOUND, ESOPHAGOSCOPY, GASTROSCOPY, DUODENOSCOPY (EGD); Surgeon:DARVIN DONOHUE; Location: GI     HERNIORRHAPHY VENTRAL N/A 9/15/2016    Procedure: HERNIORRHAPHY VENTRAL;  Surgeon: Juanita Bernabe MD;  [facility]      HYSTERECTOMY  1997 or 1998    USO     INCISION AND DRAINAGE ABDOMEN WASHOUT, COMBINED  8/16/2012    Procedure: COMBINED INCISION AND DRAINAGE ABDOMEN WASHOUT;  ,debridement and Drainage Post Appendectomy;  Surgeon: Ron Austin MD;  [facility]     INJECT TRANSVERSUS ABDOMINIS PLANE (TAP) BLOCK BILATERAL Bilateral 5/26/2016    Procedure: INJECT TRANSVERSUS ABDOMINIS PLANE (TAP) BLOCK BILATERAL;  Surgeon: Leonard Mccallum MD;  [facility]     LAPAROSCOPIC APPENDECTOMY  7/30/2012    Procedure: LAPAROSCOPIC APPENDECTOMY;  Open Appendectomy;  Surgeon: Ron Austin MD;  [facility]     PANCREATECTOMY, TRANSPLANT AUTO ISLET CELL, COMBINED  1/6/2012    Procedure:COMBINED PANCREATECTOMY, TRANSPLANT AUTO ISLET CELL; Total  Pancreatectomy, Auto Islet Transplant, splenectomy, 18fr. transgastric-jejunal feeding tube placement, liver biopsy; Surgeon:PALAK LEE; [facility]     REPLACE GASTROSTOMY TUBE, PERCUTANEOUS N/A 8/30/2017    Procedure: REPLACE GASTROSTOMY TUBE, PERCUTANEOUS;  GJ Tube Change;  Surgeon: Jose Nath PA-C;  [facility]     SPLENECTOMY              Previous Endoscopy:     Results for orders placed or performed during the hospital encounter of 08/01/17   COLONOSCOPY   Result Value Ref Range    COLONOSCOPY       [address] [place] [address] [phone]     Endoscopy Department  _______________________________________________________________________________  Patient Name: Chantell Kidd               Procedure Date: 8/1/2017 7:56 AM  MRN: [id number]                       Account Number: [id number]  YOB: 1965             Admit Type: Outpatient  Age: 51                               Room: Spec. Proc.  Gender: Female                        Note Status: Finalized  Attending MD: Deirdre Harris MD  Total Sedation Time:   _______________________________________________________________________________      Procedure:           Colonoscopy  Indications:         Rectal bleeding  Providers:           Deridre Harris MD, Yuliet Marie, RN  Patient Profile:     Ms. Kidd is a 49 year old female with prior idiopathic                        chronic pancreatitis s/p EUS and multiple ERCPs with                        eventual total pancreatectomy with a uto-islet cell                        transplant in 1/2012, with ongoing chronic abdominal                        pain (with a negative evaluation including prior CTs,                        MRCPs, US abd, EGDs and colonoscopies), nausea/vomiting                        (now improved), and iron deficiency anemia. She is                        referred for colonoscopy for BRBPR.  Referring MD:        Thomas Estrada MD  Medicines:           Monitored Anesthesia Care  Complications:       No immediate complications.  _______________________________________________________________________________  Procedure:           Pre-Anesthesia Assessment:                       - See the other procedure note for documentation of the                        pre-procedure assessment.                       - See separate Anesthesia note for pre-anesthesia                        assessment.                       After obtaining informed consent, the colonoscope was                        passed under dir ect vision. Throughout the procedure,                        the patient's blood pressure, pulse, and oxygen                        saturations were monitored continuously. The Colonoscope                        was introduced through the anus and advanced to the                        terminal ileum. The colonoscopy was performed without                        difficulty. The patient tolerated the procedure well.                        The quality of the bowel preparation was evaluated using                        the BBPS ([place] Bowel Preparation Scale) with scores                         of: Right Colon = 1 (portion of mucosa seen, but other                        areas not well seen due to staining, residual stool                        and/or opaque liquid), Transverse Colon = 2 (minor                        amount of residual staining, small fragments of stool                        and/or opaque liquid, but mucosa seen well) and Left                        Colon = 2 (minor amoun t of residual staining, small                        fragments of stool and/or opaque liquid, but mucosa seen                        well). The total BBPS score equals 5. The quality of the                        bowel preparation was fair to good, but with extensive                        lavage and cleaning the prep was felt to be good.                                                                                   Findings:       The perianal exam findings include skin tags.       The digital rectal exam was normal.       The terminal ileum appeared normal.       Semi-liquid stool was seen in the right and mid-colon. Some solid        stool/vegetable material was found in the transverse colon, making        visualization difficult. Extensive lavage of the entire colon was        performed (>1500 mL) resulting in clearance with adequate visualization.        The solid pieces of stool had to be moved with the scope to visualize        underneath.       The exam was otherwise without  abnormality on direct and retroflexion        views.                                                                                   Impression:          No source of bleeding found on exam and no signs of                        active or recent bleeding. Noted skin tags which may                        suggest prior hemorrhoids and irregular movements which                        can predispose to fissure.                       - Preparation of the colon was fair, but after lavage                        was felt to be good.                        - Perianal skin tags found on perianal exam.                       - The examined portion of the ileum was normal.                       - Some small solid stool in the transverse colon which                        was moved to facilitate visualization.                       - The examination was otherwise normal on direct and                        retroflexion views.                       - No specimens collected.  Recommendation:      -  Discharge patient to home.                       - Resume previous diet.                       - Return to referring physician.                       - Repeat colonoscopy for screening purposes in 10 years                        (unless there is new information regarding family                        history which would prompt earlier exam).                       - Future colonoscopies should be done with a 1.5 - 2 day                        prep.                                                                                     Deirdre Harris MD  _______________________  Deirdre Harris MD  8/1/2017 12:04:46 PM  I was physically present for the entire viewing portion of the exam.  __________________________  Signature of teaching physician  B4c/Tyler Harris MD  Number of Addenda: 0    Note Initiated On: 8/1/2017 7:56 AM  Scope In:  Scope Out:              Social History:   Reviewed and edited as appropriate  Social History     Socioeconomic History     Marital status:      Spouse name: Not on file     Number of children: Not on file     Years of education: Not on file     Highest education level: Not on file   Occupational History     Not on file   Social Needs     Financial resource strain: Not on file     Food insecurity:     Worry: Not on file     Inability: Not on file     Transportation needs:     Medical: Not on file     Non-medical: Not on file   Tobacco Use     Smoking status: Never Smoker     Smokeless tobacco: Never Used    Substance and Sexual Activity     Alcohol use: No     Alcohol/week: 0.0 oz     Drug use: No     Sexual activity: Yes   Lifestyle     Physical activity:     Days per week: Not on file     Minutes per session: Not on file     Stress: Not on file   Relationships     Social connections:     Talks on phone: Not on file     Gets together: Not on file     Attends [employer] service: Not on file     Active member of club or organization: Not on file     Attends meetings of clubs or organizations: Not on file     Relationship status: Not on file     Intimate partner violence:     Fear of current or ex partner: Not on file     Emotionally abused: Not on file     Physically abused: Not on file     Forced sexual activity: Not on file   Other Topics Concern     Parent/sibling w/ CABG, MI or angioplasty before 65F 55M? Not Asked   Social History Narrative     Not on file            Family History:   Reviewed and edited as appropriate  No known history of gastrointestinal/liver disease or  gastrointestinal malignancies       Allergies:   Reviewed and edited as appropriate     Allergies   Allergen Reactions     Corticosteroids Other (See Comments)     All oral,IV and injectable steroids are contraindicated (unless in life threatening situations) in Islet Auto transplant recipients. They can cause irreversible loss of islet cell function. Please contact patients transplant care coordinator Malini ORDAZ @[phone]/Pager [phone]  and Endocrinologist prior to administration.     Chocolate Flavor Rash     Breaks out when eats chocolate            Medications:     Medications Prior to Admission   Medication Sig Dispense Refill Last Dose     acetaminophen 500 MG CAPS Take 1,000 mg by mouth three times a day as needed.   6/10/2019 at AM - 2 tabs     alendronate (FOSAMAX) 70 MG tablet Take 1 tablet (70 mg) by mouth every 7 days On Sundays take first thing in the morning with plain water and remain upright for at least 30  minutes and until after first food of the day    Do not restart Fosamax (alendronate) until your difficulty swallowing has resolved and you have finished the entire course of fluconazole (Diflucan). 4 tablet 0 6/9/2019 at AM     alum & mag hydroxide-simethicone (MYLANTA/MAALOX) 200-200-25 MG CHEW chewable tablet Take 1 tablet by mouth 3 times daily as needed for indigestion   Past Week at PRN     amylase-lipase-protease (CREON 12) 72836 units CPEP Take 6 to 8 capsules by mouth with meals and take 4 capsules with snacks. Needs up to 25 capsules per day 750 capsule 5 6/9/2019 at Unknown time     aspirin 81 MG tablet Take 81 mg by mouth daily.   6/9/2019 at AM     cyclobenzaprine (FLEXERIL) 5 MG tablet Take 1 tablet (5 mg) by mouth 3 times daily as needed for muscle spasms 42 tablet 0 6/9/2019 at PM     diclofenac (FLECTOR) 1.3 % Patch Place 1 patch onto the skin 2 times daily 30 each 1 Past Week at Unknown time     diclofenac (VOLTAREN) 1 % GEL 2 g Apply 2 g to skin four times a day as needed (to affected upper extremity joint(s)). Maximum 8g/day per joint, 16g/day total.   6/8/2019 at PM     dicyclomine (BENTYL) 10 MG capsule Take 10 mg by mouth every 6 hours   6/9/2019 at Unknown time     dronabinol (MARINOL) 2.5 MG capsule Take 2 capsules (5 mg) by mouth 2 times daily as needed 56 capsule 5 6/9/2019 at AM - dose 1 of 2     DULoxetine (CYMBALTA) 30 MG capsule Take 1 capsule (30 mg) by mouth daily With 60mg capsule for total dose of 90mg 90 capsule 0 6/9/2019 at AM     DULoxetine (CYMBALTA) 60 MG EC capsule Take 1 capsule (60 mg) by mouth daily With 30mg capsule for total dose of 90mg 90 capsule 1 6/9/2019 at AM     furosemide (LASIX) 20 MG tablet Take 1 tablet (20 mg) by mouth 2 times daily 180 tablet 1 6/9/2019 at AM - dose 1 of 2     hydrocortisone (CORTEF) 10 MG tablet Take 10 mg in the morning and 10 mg at bedtime. Watch for hypoglycemia recurrence. 60 tablet 1 6/9/2019 at AM - dose 1 of 2     insulin aspart  (NOVOPEN ECHO) 100 UNIT/ML cartridge Humalog 0.5 units per 15 grams carbohydrate      Correction scale of 0.5 units for every 50 points above 150 mg/dL.      Up to 10 units a day 3 mL 3 Patient has pump     insulin degludec (TRESIBA FLEXTOUCH) 100 UNIT/ML pen Take 8 units daily 15 mL 0 6/9/2019 at AM     levothyroxine (SYNTHROID/LEVOTHROID) 112 MCG tablet Take 1 tablet (112 mcg) by mouth daily 90 tablet 3 6/9/2019 at AM     linaclotide (LINZESS) 145 MCG capsule Take 1 capsule (145 mcg) by mouth every morning (before breakfast) (Patient taking differently: Take 1 capsule by mouth every morning before breakfast as needed.) 90 capsule 3 Past Month at PRN     metoclopramide (REGLAN) 5 MG tablet Take 2 tablets (10 mg) by mouth 3 times daily 180 tablet 3 6/9/2019 at Noon - dose 2 of 3     ondansetron (ZOFRAN-ODT) 4 MG ODT tab DISSOLVE ONE TABLET ON THE TONGUE EVERY 6 HOURS AS NEEDED FOR NAUSEA AND VOMITING 60 tablet 2 6/9/2019 at Unknown time     pantoprazole (PROTONIX) 40 MG EC tablet Take 1 tablet (40 mg) by mouth 2 times daily 180 tablet 3 6/9/2019 at AM - dose 1 of 2     polyethylene glycol (MIRALAX/GLYCOLAX) packet Take 1 packet by mouth 2 times daily as needed for constipation  14 each 5 Past Week at PRN     potassium chloride SA (K-DUR/KLOR-CON M) 20 MEQ CR tablet Take 1 tablet (20 mEq) by mouth daily 90 tablet 1 6/9/2019 at AM     pregabalin (LYRICA) 150 MG capsule Take 1 capsule (150 mg) by mouth 3 times daily 90 capsule 5 6/9/2019 at PM     senna-docusate (SENOKOT-S/PERICOLACE) 8.6-50 MG tablet Take 1-2 tablets by mouth daily as needed for constipation 60 tablet 3 Past Week at PRN     sodium chloride (OCEAN) 0.65 % nasal spray Spray 1 spray into both nostrils daily as needed for congestion 30 mL 0 Past Month at PRN     SUMAtriptan (IMITREX) 50 MG tablet Take 1 tablet (50 mg) by mouth at onset of headache for migraine Take 1 Tab by mouth Once as needed for Migraine Headache. May repeat after two hours.  Maximum  dose 200 mg/24 hours. 30 tablet 1 Past Week at Unknown time     topiramate (TOPAMAX) 100 MG tablet Take 1 tablet (100 mg) by mouth 2 times daily 180 tablet 1 6/9/2019 at AM - dose 1 of 2     traZODone (DESYREL) 100 MG tablet TAKE 1-2 TABLETS BY MOUTH AN HOUR BEFORE BEDTIME 100 tablet 1 6/8/2019 at PM     Injection Device for insulin (NOVOPEN ECHO) RICARDO Use with   Insulin 1 each 0 Not a med at      Nutritional Supplements (BOOST HIGH PROTEIN) LIQD After above baseline labs are drawn, give: 6 mL/kg to maximum of 360 mL; the beverage is to be consumed within 5 minutes.  0 Unknown at Unknown time     order for DME by [employer] [facility] [place], [place]  Ph: [phone]  Fax: [phone]    Nutren 1.5 @ 10 ml/hr with advancement by 10 ml/hr q 8 hours to goal rate of 40 ml/hr.  This will provide 1440 kcals (27 kcal/kg/day), 65 g PRO (1.2 g/kg/day), 730 mL H2O, 169 g CHO and no Fiber daily.    1 Month 3 Not a med at              Review of Systems:     A complete review of systems was performed and is negative except as noted in the HPI           Physical Exam:   BP 95/52 (BP Location: Right arm)   Pulse 56   Temp 96.7  F (35.9  C) (Oral)   Resp 14   Wt 62.8 kg (138 lb 8 oz)   SpO2 96%   BMI 25.33 kg/m    Wt:   Wt Readings from Last 2 Encounters:   06/10/19 62.8 kg (138 lb 8 oz)   04/18/19 61.1 kg (134 lb 11.2 oz)      Constitutional: cooperative, pleasant, not dyspneic/diaphoretic, no acute distress  Eyes: Sclera anicteric/injected  Ears/nose/mouth/throat: Normal oropharynx without ulcers or exudate, mucus membranes moist, hearing intact  Neck: supple, thyroid normal size  CV: No edema  Respiratory: Unlabored breathing  Lymph: No axillary, submandibular, supraclavicular or inguinal lymphadenopathy  Abdomen:Nondistended, +bs, no hepatosplenomegaly, nontender, no peritoneal signs  Skin: warm, perfused, no jaundice  Neuro: AAO x 3, No asterixis  Psych: Normal affect  MSK: Normal gait          Data:   Labs and imaging below were independently reviewed and interpreted    BMP  Recent Labs   Lab 06/11/19 0633 06/10/19  1031    141   POTASSIUM 3.6 3.8   CHLORIDE 107 106   ELIZA 8.7 9.0   CO2 28 26   BUN 6* 10   CR 0.82 0.71   * 219*     CBC  Recent Labs   Lab 06/11/19 0633 06/10/19  1031   WBC 7.2 7.6   RBC 3.15* 3.64*   HGB 9.5* 10.8*   HCT 30.1* 33.8*   MCV 96 93   MCH 30.2 29.7   MCHC 31.6 32.0   RDW 14.2 13.7    350     INR  Recent Labs   Lab 06/10/19  1031   INR 0.99     LFTs  Recent Labs   Lab 06/11/19 0633 06/10/19  1031   ALKPHOS 90 105   AST 37 61*   ALT 44 59*   BILITOTAL 1.0 0.4   PROTTOTAL 6.0* 7.0   ALBUMIN 3.2* 3.8      PANC  Recent Labs   Lab 06/10/19  1031   LIPASE 21*       CT abdomen (6/10)  IMPRESSION:   1. Nonspecific findings suggestive of enterocolitis including mild  hyperemia of the distal small bowel and wall edema of the rectosigmoid  colon. No evidence of obstruction. No drainable intra-abdominal fluid  collections identified.  2. Extensive postsurgical changes of the abdomen detailed above.  3. Moderate bladder distention. Nonspecific but suggestive of urinary  retention.       PERTINENT PAST MEDICAL HISTORY:  1. Chronic abdominal pain with a history of idiopathic chronic pancreatitis, status post total pancreatectomy and auto islet cell transplant. She did have a barrier stent that was removed via ERCP later that year in 2012.   2. History of appendicitis with ruptured appendix in mid 2012, status post appendectomy.   3. She has relatively brittle diabetes, status post her total pancreatectomy and auto islet cell transplant.   4. History of migraines for which she takes the Tylenol.   5. General anxiety disorder.   6. Major depressive disorder.   7. GERD for which she takes a PPI.   8. Chronic back pain  9. Oral and esophageal candidiasis     PREVIOUS ABDOMINAL/GYNECOLOGIC SURGERIES:  1. Total pancreatectomy and auto islet cell transplant as above.   2.  Status post cholecystectomy about 5 years ago.   3. Status post appendectomy in mid 2012.

## 2019-06-11 NOTE — PROGRESS NOTES
"    [facility], [place]    Progress Note - Sydni 1 Service        Date of Admission:  6/10/2019    Assessment & Plan   Chantell Kidd is a 53F presenting with central abdominal pain with a PMH of TPIAT (2012), appendectomy, cholecystectomy, adrenal insufficiency, migraine, and MDD/anxiety.    Changes today:  -GI to see  -Carafate ordered  -NPO at midnight  -Hg trend    Odynophagia  Abdominal pain  Lactic acidosis  S/p TPIAT (2012), appendectomy, cholecystectomy  Etiologies of upper GI bleed could include PUD, gastropathy, esophagitis, vascular lesions (AVM, gave, etc.), or MWT. No recent EtOH or NSAIDs. No diarrhea today with only two stools. Given her extensive abdominal surgeries, adhesions is always a consideration.  -GI consult, recs appreciated: NPO at midnight, carafate, scope in AM  -Continue pantoprazole 40 mg twice daily  -Tylenol for pain, dilaudid 1mg q3hr for breakthrough  -Trend hemoglobin daily while not having overt melena/VSS stable  - Continue PTA bentyl  - Hold ASA 81 for now    T1DM, hypoglycemia  S/p TPIAT with loss of most of endocrine function, on home insulin pump. A1C 4/18/19 was 9.6. Initial glucose 219 on admission then later was 34 without any insulin given but likely her home insulin pump was still running. Spoke with endocrine and they will see her tomorrow and for now will do 8U degludec, D5 drip at 50cc/hr and check glucose q4hr with low ISS. Not taking PO.  - Endocrine consult  - 8U degludec  - 5% D5/LR at 50cc/hr  - Use low-scale ISS (or very-low)    Adrenal insufficiency  - Endocrine consult  - Has been on lasix 20mg BID for \"fluid retention likely related to her steroid use\" since 2016 (normal echo that year); no edema now and will continue given good BP  - Home is 10mg hydrocortisone BID    Anxiety/MDD  - PTA duloxetine, topiramate, trazodone    Chronic pain  - PTA pregabalin     Diet: Snacks/Supplements Adult: Boost Breeze; Between Meals  NPO for " Medical/Clinical Reasons Except for: Meds  Consistent Carbohydrate Diet 7291-7881 Calories: Moderate Consistent CHO (4-6 CHO units/meal)    Fluids: D5LR  Lines: PIV  DVT Prophylaxis: Pneumatic Compression Devices  Mckee Catheter: not present  Code Status: Full Code      Disposition Plan   Expected discharge: Tomorrow, recommended to prior living arrangement once adequate pain management/ tolerating PO medications and hemoglobin stable.  Entered: Paty Caicedo MD 06/11/2019, 2:03 PM     The patient's care was discussed with the Attending Physician, Dr. Moon.    Paty Caicedo MD  [facility] 1 Service  [facility], [place]  Pager: [phone]  Please see sticky note for cross cover information  ______________________________________________________________________    Interval History   No events overnight. No melenotic stools. Some nausea without emesis. +flatulence. Abd continues but manageable.     Data reviewed today: I reviewed all medications, new labs and imaging results over the last 24 hours. I personally reviewed no images or EKG's today.    Physical Exam   Vital Signs: Temp: 97  F (36.1  C) Temp src: Oral BP: 93/51 Pulse: 60 Heart Rate: 54 Resp: 16 SpO2: 93 % O2 Device: None (Room air)    Weight: 138 lbs 8 oz  General Appearance: Alert, comfortable,   Respiratory: CTAB  Cardiovascular: RRR no appreciable murmur  GI: Soft, NTND normoactive bowel sounds. Tender to light palpation in epigastric rgion. Tender on deeper pressure mid abd. No gaurding or rebound  Skin: Some scattered healed ecchymoses    Data   Recent Labs   Lab 06/11/19  0633 06/10/19  1031   WBC 7.2 7.6   HGB 9.5* 10.8*   MCV 96 93    350   INR  --  0.99    141   POTASSIUM 3.6 3.8   CHLORIDE 107 106   CO2 28 26   BUN 6* 10   CR 0.82 0.71   ANIONGAP 5 9   ELIZA 8.7 9.0   * 219*   ALBUMIN 3.2* 3.8   PROTTOTAL 6.0* 7.0   BILITOTAL 1.0 0.4   ALKPHOS 90 105   ALT 44 59*   AST 37 61*   LIPASE  --   21*     Pt was seen and examined by me on morning rounds; confirmed midepigastric and periumbilical tenderness to palpation; reviewed labs, vitals, imaging. I agree with the detailed A/P documentation above.  Appreciate GI recommendations.      Tonie Moon MD

## 2019-06-11 NOTE — PLAN OF CARE
Oriented. Drowsy, easily wakes to voice. VSS. Hypotensive at times, but above MD notification parameters. Patient reports slight improvement in abdominal pain today - Oxycodone given with IV Dilaudid for breakthrough. Up independently in room. Voiding spontaneously. No gas or BM today. GI consulted. Clear liquid diet ordered, NPO at midnight for EGD tomorrow. Speech therapist will evaluate when diet is advanced. Zofran, Compazine and Reglan given for intermittent nausea. Patient's home insulin pump paused - awaiting diabetic educator visit for further counseling/orders.   Continue with POC. Encouraged patient to stick with oral PRN's to prepare for potential discharge tomorrow.

## 2019-06-11 NOTE — PLAN OF CARE
"AVSS, alert/oriented, up with SBA.  Abdominal and back pain managed with oxycodone and IV dilaudid.  NPO, q2h BGs with sliding scale coverage, MIVF with dextrose at 50/hr.    Blood sugars have ranged from  since the low of 34 @ 1900.  She is not able to feel when she is hypoglycemic.  Pt's personal insulin pump is paused with no basal rate infusing since 1900. Endocrine consult today so pt can use her pump if needed.   Zofran, compazine, reglan managing on-going nausea. Has had no emesis or BM since being admitted.   Voiding good amts.  Recently has felt like food \"gets stuck\" in her throat so was hesitant to take pills, but has taken smaller pills with no difficulty. Swallow study will be done today.  "

## 2019-06-11 NOTE — CONSULTS
Diabetes Management Consult  Date of Service: June 11, 2019  Chief Complaint Glycemic Control  Consult requested by: Sonia Severino MD    Assessment/Plan  Recommendations:  - Discontinue Dextrose containing IVF  - correction coverage: Novolog Insulin subcutaneous before meals and HS.      - 279: give 1 unit    - 379: give 2 unit    - 479: give 3 unit  - Carb Coverage for meals and snacks : Novolog 1 unit for every 30g carbohydrate  - Glucose checks: TID AC, HS, 2AM.    - Increase  Hydrocortisone to 20mg PO in AM; 10mg PO in PM for a few days and then reduce back to the pre-admission dose of 10 mg AM and 5 mg PM.    - One-time Hydrocortisone 40mg IV on-call before the EGD tomorrow  - Check TSH and Free T4  Remove the insulin pump until we determine when to restart it.      Diabetes Mellitus (A1C of 8.2%) S/P Total Pancreatectomy with Islet Autotransplantation in 2012  She appears to have partial islet graft function making some endogenous insulin but it hasn't been adequate for good control, even on the insulin pump. Her sugars have been stable today on the D5-LR and she has only received one unit of insulin.  Given stability in her sugars with some endogenous insulin production, we recommend providing carbohydrate coverage for her clear liquid diet, high blood sugar correction  insulin, and close glucose monitoring.      Hypoglycemia, history of hypoglycemia unawareness;   The hypoglycemic episode on 06/10 was likely multifactorial as the patient was NPO and receiving ongoing insulin infusion, which was further complicated by hypoglycemia unawareness without properly functioning sensor and decreased steroid intake.  She had likely missed hydrocortisone doses and also LT4 doses.      On corticosteroid for history of  central adrenal insufficiency-- on hydrocortisone 10/5 prior to admission. If she really had no steroid exposure since Friday night 6/7/19 then the AM cortisol on 6/10/19 suggests some  HPA recovery.  Still, given uncertainty of the history, and that her blood pressures have been low this hospitalization with increased fatigue, recommend to increase hydrocortisone to 20 mg AM and 10 mg PM now for a few days and then taper back toward her pre-admission dose.    Give one-time hydrocortisone 40 mg IV  immediately prior to her scheduled EGD tomorrow 6/12/19.     Hypothyroidism  She is on Levothyroxine 112 mcg/day with admitted missed doses. Insufficiently treated hypothyroidism may contribute to hypoglycemia. Recommend checking thyroid function labs (TSH and free T4)  this admission to ensure hypothyroidism is not confounding her glycemic control.       Patient was seen and discussed with attending physician, Dr. Rosy Hawthorne, MS4  [facility]    Attending tie-in statement: Chart reviewed . Patient seen and examined by me along with the team. I have reviewed and amended the note to reflect my findings and impression.     Meliza Gallegos MD    History of Present Illness   Chantell Kidd is a 53 year old female with PMHx of chronic pancreatitis s/p total pancreatectomy with islet autotransplantation (TPIAT), nomi-en-Y, and splenectomy in 2012 with subsequent insulin-dependent diabetes mellitus (on insulin pump since February 2014), recurrent severe hypoglycemia requiring numerous hospitalizations (last in September 2018), ongoing treatment for possible central adrenal insufficiency, hypothyroidism, and prior GJ tube.     She was admitted to the hospital on 06/10 with 4 days of abdominal pain, melena, and coffee ground emesis. She had markedly reduced PO intake during these four days secondary to the nausea/vomiting. It does not appear that patient modified her basal insulin rate, and she did not administer any insulin boluses on 06/10. She has a continuous glucose monitor and denies any hypoglycemia.  Patient states that she did not take her hydrocortisone or  levothyroxine for the last few days prior to admission. She was found to have a 3g hemoglobin drop from her apparent baseline but is now hemodynamically stable with improving abdominal pain. Gastroenterology is following and she is scheduled for an EGD on 06/12.      Regarding her inpatient glycemic management this hospitalization, her blood sugar was initially 219 in the ED. Patient has a continuous glucose monitor, and patient reports that it was removed for the CT Abdomen/Pelvis that she had in the ED. Her blood sugar was then checked when she arrived to the floor (7 hours later) and it was found to be 34 at that time. Patient is unable to feel when her sugars are low. It appears that her ambulatory insulin pump had continued to run during that time period.  Her ambulatory pump was stopped, and she was placed on D5W-LR IVF. She has not received any basal insulin and only received 1 unit of corrective Aspart earlier this morning. Her sugars have stabilized in the 120s-160s today.    For outpatient management, patient follows with Dr. Maher. She underwent the total pancreatectomy and islet autotransplantation in January 2012. She has been on an insulin pump since February 2014 and also has a continuous glucose monitor, as part of a new [facility] 670G.  It appears that she has partial islet graft function.     She has several previous hospitalizations for hypoglycemia with her most recent hospitalization in September 2018, and was found to have hypoglycemia with elevated insulin and low c-peptide consistent exogenous insulin overdose. Of note, her insulin pump was temporarily discontinued following her September 2018 hospitalization, but it was restarted shortly thereafter.  Per her most recent endocrinology note, her pump is set at:    Basal rate: 0.5 units/hr  Bolus: I:C ratio 25 grams.  mg/dL. Active insulin time: 3 hours.     She states that she has only had 2 episodes of hypoglycemia in the past 2 months  with these settings. Her most recent episode of hypoglycemia was 3 weeks ago and her blood sugar was 68 at that time. She has hypoglycemia unawareness and is completely reliant on her sensor to alert her of lows.    Diabetes Complications:  History of peripheral neuropathy - on Lyrica. No history of retinopathy - she follows with ophthalmology yearly, per patient report  History of DKA: None  Able to Detect Hypoglycemia: No  Usual Diabetes Care Provider: Amena Maher MD    Central pattern Adrenal insufficiency was originally diagnosed when she was hospitalized 03/2016 for hypoglycemia as her cortisol levels x3 were low. She has been treated with hydrocortisone.She was on Hydrocortisone 10mg BID for two years, and in 04/2019 her dose was reduced to 10mg in AM and 5mg in PM    The hypothyroidism diagnosis is longstanding, preceding the pancreatectomy.      Recent Labs   Lab 06/11/19  0810 06/11/19  0633 06/11/19  0600 06/11/19  0350 06/11/19  0203 06/11/19  0009 06/10/19  2209  06/10/19  1031   GLC  --  142*  --   --   --   --   --   --  219*   *  --  138* 169* 162* 151* 168*   < >  --     < > = values in this interval not displayed.       Diabetes Control:   Lab Results   Component Value Date    A1C 8.2 06/11/2019    A1C Canceled, Test credited 06/10/2019    A1C 9.6 04/18/2019    A1C 6.6 08/27/2018    A1C 7.5 02/01/2018       Relevant labs  3/29/16 1706 cortisol 1.7  3/30/16 0439 cortisol 1.6  3/30/16 1402 ACTH  11, LH 34.5, FSH 98.6  3/31/16 0654 cortisol 0.6, IGF1 79  10/19/17: islet cell antibody < 1:4  8/28/18: TSH 2.21, free T4 0.77  9/3/18: insluin 45.6  9/6/18: insulin 9.3  9/7/18 C peptide 2.8  4/18/19 C peptide 1.8   6/10/19 1031 cortisol 12.7    Imaging review  6/10/19 CT abdomen: both adrenals visualized and normal size /    Review of Systems  Weight loss from 154 1/18 to 147 9/18 to 138 on admisison  Nausea and abdominal pain continue  No vomiting  10 point ROS completed with pertinent positives  and negatives noted in the HPI    Current Facility-Administered Medications   Medication     acetaminophen (TYLENOL) tablet 650 mg     alum & mag hydroxide-simethicone (MYLANTA/MAALOX) 200-200-25 MG chewable tablet 1 tablet     amylase-lipase-protease (CREON 12) 55422 units per capsule 72,000-96,000 Units     cyclobenzaprine (FLEXERIL) tablet 5 mg     glucose gel 15-30 g    Or     dextrose 50 % injection 25-50 mL    Or     glucagon injection 1 mg     diclofenac (VOLTAREN) 1 % topical gel 2 g     dicyclomine (BENTYL) capsule 10 mg     dronabinol (MARINOL) capsule 5 mg     DULoxetine (CYMBALTA) DR capsule 90 mg     hydrocortisone (CORTEF) tablet 10 mg     [START ON 6/12/2019] hydrocortisone (CORTEF) tablet 20 mg     hydrocortisone sodium succinate PF (solu-CORTEF) injection 40 mg     HYDROmorphone (PF) (DILAUDID) injection 0.5 mg     [START ON 6/12/2019] insulin aspart (NovoLOG) inj (RAPID ACTING)     [START ON 6/12/2019] insulin aspart (NovoLOG) inj (RAPID ACTING)     insulin aspart (NovoLOG) inj (RAPID ACTING)     insulin aspart (NovoLOG) inj (RAPID ACTING)     insulin aspart (NovoLOG) inj (RAPID ACTING)     levothyroxine (SYNTHROID/LEVOTHROID) tablet 112 mcg     melatonin tablet 1 mg     metoclopramide (REGLAN) tablet 10 mg    Or     metoclopramide (REGLAN) injection 10 mg     metoclopramide (REGLAN) tablet 10 mg     naloxone (NARCAN) injection 0.1-0.4 mg     ondansetron (ZOFRAN-ODT) ODT tab 4 mg    Or     ondansetron (ZOFRAN) injection 4 mg     oxyCODONE (ROXICODONE) tablet 5 mg     pantoprazole (PROTONIX) EC tablet 40 mg     potassium chloride ER (K-DUR/KLOR-CON M) CR tablet 20 mEq     pregabalin (LYRICA) capsule 150 mg     prochlorperazine (COMPAZINE) injection 10 mg    Or     prochlorperazine (COMPAZINE) tablet 10 mg    Or     prochlorperazine (COMPAZINE) Suppository 25 mg     sodium chloride (OCEAN) 0.65 % nasal spray 1 spray     sucralfate (CARAFATE) tablet 1 g     SUMAtriptan (IMITREX) tablet 50 mg      topiramate (TOPAMAX) tablet 100 mg     traZODone (DESYREL) tablet 100-200 mg       Past medical, family and social histories are reviewed and updated.    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    TPIAT     Spasm of sphincter of Oddi      Past Surgical History:   Procedure Laterality Date     ARTHROPLASTY CARPOMETACARPAL (THUMB JOINT)  5/2/2014    Procedure: ARTHROPLASTY CARPOMETACARPAL (THUMB JOINT);  Surgeon: Carina Panda MD;  Location:  OR     CHOLECYSTECTOMY  2004     COLONOSCOPY  7/18/2014    Procedure: COLONOSCOPY;  Surgeon: Aurora Sahu MD;  Location:  GI     COLONOSCOPY N/A 8/1/2017    Procedure: COLONOSCOPY;  Colonoscopy and upper endoscopy;  Surgeon: Deirdre Harris MD;  Location:  GI     ENDOSCOPIC RETROGRADE CHOLANGIOPANCREATOGRAM       ENDOSCOPIC RETROGRADE CHOLANGIOPANCREATOGRAM  4/19/2011    Procedure:ENDOSCOPIC RETROGRADE CHOLANGIOPANCREATOGRAM; Pancreatic Stent Placement       ENDOSCOPIC RETROGRADE CHOLANGIOPANCREATOGRAM  5/26/2011    Procedure:ENDOSCOPIC RETROGRADE CHOLANGIOPANCREATOGRAM; with Pancreatic Stent Removal; Surgeon:DALE MIMS; Location: OR     ENDOSCOPY UPPER, COLONOSCOPY, COMBINED  4/25/2012    Procedure:COMBINED ENDOSCOPY UPPER, COLONOSCOPY; Enteroscopy with Bile Duct Stent Removal, Colonoscopy  *Latex Safe Room*; Surgeon:GRACY GODWIN; Location: OR     ESOPHAGOSCOPY, GASTROSCOPY, DUODENOSCOPY (EGD), COMBINED  5/26/2011    Procedure:COMBINED ESOPHAGOSCOPY, GASTROSCOPY, DUODENOSCOPY (EGD); Surgeon:DALE MIMS; Location: OR      ESOPHAGOSCOPY, GASTROSCOPY, DUODENOSCOPY (EGD), COMBINED N/A 10/30/2014    Procedure: COMBINED ESOPHAGOSCOPY, GASTROSCOPY, DUODENOSCOPY (EGD), BIOPSY SINGLE OR MULTIPLE;  Surgeon: Sarai Moon MD;  [facility]     ESOPHAGOSCOPY, GASTROSCOPY, DUODENOSCOPY (EGD), COMBINED Left 7/6/2015    Procedure: COMBINED ESOPHAGOSCOPY, GASTROSCOPY, DUODENOSCOPY (EGD), BIOPSY SINGLE OR MULTIPLE;  Surgeon: Thomas Etsrada MD;  [facility]     ESOPHAGOSCOPY, GASTROSCOPY, DUODENOSCOPY (EGD), COMBINED N/A 7/8/2016    Procedure: COMBINED ESOPHAGOSCOPY, GASTROSCOPY, DUODENOSCOPY (EGD), BIOPSY SINGLE OR MULTIPLE;  Surgeon: Eloy Klein MD;  [facility]     ESOPHAGOSCOPY, GASTROSCOPY, DUODENOSCOPY (EGD), COMBINED N/A 8/4/2016    Procedure: COMBINED ESOPHAGOSCOPY, GASTROSCOPY, DUODENOSCOPY (EGD), BIOPSY SINGLE OR MULTIPLE;  Surgeon: Jason Brown MD;  [facility]     ESOPHAGOSCOPY, GASTROSCOPY, DUODENOSCOPY (EGD), COMBINED N/A 8/1/2017    Procedure: COMBINED ESOPHAGOSCOPY, GASTROSCOPY, DUODENOSCOPY (EGD);;  Surgeon: Deirdre Harris MD;  [facility]     GYN SURGERY      Hysterectomy and USO     [facility] ENDOSCOPY W EUS  7/20/2011    Procedure:COMBINED ENDOSCOPIC ULTRASOUND, ESOPHAGOSCOPY, GASTROSCOPY, DUODENOSCOPY (EGD); Surgeon:DARVIN DONOHUE; Location:[facility]     HERNIORRHAPHY VENTRAL N/A 9/15/2016    Procedure: HERNIORRHAPHY VENTRAL;  Surgeon: Juanita Bernabe MD;  [facility]     HYSTERECTOMY  1997 or 1998    USO     INCISION AND DRAINAGE ABDOMEN WASHOUT, COMBINED  8/16/2012    Procedure: COMBINED INCISION AND DRAINAGE ABDOMEN WASHOUT;  ,debridement and Drainage Post Appendectomy;  Surgeon: Ron Austin MD;  [facility]     INJECT TRANSVERSUS ABDOMINIS PLANE (TAP) BLOCK BILATERAL Bilateral 5/26/2016    Procedure: INJECT TRANSVERSUS ABDOMINIS PLANE (TAP) BLOCK BILATERAL;  Surgeon: Leonard Mccallum MD;  [facility]     LAPAROSCOPIC  APPENDECTOMY  7/30/2012    Procedure: LAPAROSCOPIC APPENDECTOMY;  Open Appendectomy;  Surgeon: Ron Austin MD;  [facility]     PANCREATECTOMY, TRANSPLANT AUTO ISLET CELL, COMBINED  1/6/2012    Procedure:COMBINED PANCREATECTOMY, TRANSPLANT AUTO ISLET CELL; Total  Pancreatectomy, Auto Islet Transplant, splenectomy, 18fr. transgastric-jejunal feeding tube placement, liver biopsy; Surgeon:PALAK LEE; Location:[facility] OR     REPLACE GASTROSTOMY TUBE, PERCUTANEOUS N/A 8/30/2017    Procedure: REPLACE GASTROSTOMY TUBE, PERCUTANEOUS;  GJ Tube Change;  Surgeon: Jose Nath PA-C;  [facility]     SPLENECTOMY         Family History  Family History   Problem Relation Age of Onset     Hypertension Mother      Diabetes Mother      Osteoporosis Mother      Cancer Father         pancreatic cancer     Diabetes Maternal Grandmother      Cardiovascular Maternal Grandmother      Cancer Maternal Grandfather         lung cancer     Cancer Sister         brain     Cancer Sister         liver cancer       Social History  Social History     Socioeconomic History     Marital status:      Spouse name: None     Number of children: None     Years of education: None     Highest education level: None   Occupational History     None   Social Needs     Financial resource strain: None     Food insecurity:     Worry: None     Inability: None     Transportation needs:     Medical: None     Non-medical: None   Tobacco Use     Smoking status: Never Smoker     Smokeless tobacco: Never Used   Substance and Sexual Activity     Alcohol use: No     Alcohol/week: 0.0 oz     Drug use: No     Sexual activity: Yes   Lifestyle     Physical activity:     Days per week: None     Minutes per session: None     Stress: None   Relationships     Social connections:     Talks on phone: None     Gets together: None     Attends Taoist service: None     Active member of club or organization: None     Attends meetings of clubs or  organizations: None     Relationship status: None     Intimate partner violence:     Fear of current or ex partner: None     Emotionally abused: None     Physically abused: None     Forced sexual activity: None   Other Topics Concern     Parent/sibling w/ CABG, MI or angioplasty before 65F 55M? Not Asked   Social History Narrative     None       Physical Exam  BP 93/51 (BP Location: Right arm)   Pulse 60   Temp 97  F (36.1  C) (Oral)   Resp 16   Wt 62.8 kg (138 lb 8 oz)   SpO2 93%   BMI 25.33 kg/m      General:  Pleasant, resting in bed, in no distress.   HEENT: NC/AT, anicteric, non-injected, oral mucous membranes moist.   Neck: Supple, no cervical lymphadenopathy  Lungs: clear to ausculation bilaterally; no crackles or wheezes. Non-labored breathing on room ai.  CV: Regular rate and rhythm. No gallops, rubs, or murmurs  ABD: Soft, non-distended. Bilateral lower abdominal tenderness to palpation. No rebound or guarding. Well healed surgical incision.  Skin: warm and dry, no obvious lesions  MSK: normal muscle bulk  Lymp: no LE edema   Psych: calm, even mood. Fluent and coherent speech.    Laboratory  Recent Labs   Lab Test 06/11/19  0633 06/10/19  1031    141   POTASSIUM 3.6 3.8   CHLORIDE 107 106   CO2 28 26   ANIONGAP 5 9   * 219*   BUN 6* 10   CR 0.82 0.71   ELIZA 8.7 9.0     CBC RESULTS:   Recent Labs   Lab Test 06/11/19  0633   WBC 7.2   RBC 3.15*   HGB 9.5*   HCT 30.1*   MCV 96   MCH 30.2   MCHC 31.6   RDW 14.2          Liver Function Studies -   Recent Labs   Lab Test 06/11/19  0633   PROTTOTAL 6.0*   ALBUMIN 3.2*   BILITOTAL 1.0   ALKPHOS 90   AST 37   ALT 44

## 2019-06-12 ENCOUNTER — APPOINTMENT (OUTPATIENT)
Dept: SPEECH THERAPY | Facility: CLINIC | Age: 54
DRG: 378 | End: 2019-06-12
Attending: STUDENT IN AN ORGANIZED HEALTH CARE EDUCATION/TRAINING PROGRAM
Payer: MEDICARE

## 2019-06-12 LAB
ANION GAP SERPL CALCULATED.3IONS-SCNC: 6 MMOL/L (ref 3–14)
BUN SERPL-MCNC: 8 MG/DL (ref 7–30)
CALCIUM SERPL-MCNC: 9.1 MG/DL (ref 8.5–10.1)
CHLORIDE SERPL-SCNC: 105 MMOL/L (ref 94–109)
CMV DNA SPEC QL NAA+PROBE: NOT DETECTED
CO2 SERPL-SCNC: 28 MMOL/L (ref 20–32)
CREAT SERPL-MCNC: 0.91 MG/DL (ref 0.52–1.04)
ERYTHROCYTE [DISTWIDTH] IN BLOOD BY AUTOMATED COUNT: 13.5 % (ref 10–15)
GFR SERPL CREATININE-BSD FRML MDRD: 71 ML/MIN/{1.73_M2}
GLUCOSE BLDC GLUCOMTR-MCNC: 123 MG/DL (ref 70–99)
GLUCOSE BLDC GLUCOMTR-MCNC: 147 MG/DL (ref 70–99)
GLUCOSE BLDC GLUCOMTR-MCNC: 195 MG/DL (ref 70–99)
GLUCOSE BLDC GLUCOMTR-MCNC: 228 MG/DL (ref 70–99)
GLUCOSE BLDC GLUCOMTR-MCNC: 89 MG/DL (ref 70–99)
GLUCOSE SERPL-MCNC: 92 MG/DL (ref 70–99)
HCT VFR BLD AUTO: 36.5 % (ref 35–47)
HGB BLD-MCNC: 11.4 G/DL (ref 11.7–15.7)
MCH RBC QN AUTO: 29.5 PG (ref 26.5–33)
MCHC RBC AUTO-ENTMCNC: 31.2 G/DL (ref 31.5–36.5)
MCV RBC AUTO: 95 FL (ref 78–100)
PLATELET # BLD AUTO: 340 10E9/L (ref 150–450)
POTASSIUM SERPL-SCNC: 4.2 MMOL/L (ref 3.4–5.3)
RBC # BLD AUTO: 3.86 10E12/L (ref 3.8–5.2)
SODIUM SERPL-SCNC: 140 MMOL/L (ref 133–144)
SPECIMEN SOURCE: NORMAL
T4 FREE SERPL-MCNC: 1.04 NG/DL (ref 0.76–1.46)
TSH SERPL DL<=0.005 MIU/L-ACNC: 3.53 MU/L (ref 0.4–4)
WBC # BLD AUTO: 7.7 10E9/L (ref 4–11)

## 2019-06-12 PROCEDURE — 25000128 H RX IP 250 OP 636: Performed by: INTERNAL MEDICINE

## 2019-06-12 PROCEDURE — 80048 BASIC METABOLIC PNL TOTAL CA: CPT | Performed by: STUDENT IN AN ORGANIZED HEALTH CARE EDUCATION/TRAINING PROGRAM

## 2019-06-12 PROCEDURE — 36415 COLL VENOUS BLD VENIPUNCTURE: CPT | Performed by: STUDENT IN AN ORGANIZED HEALTH CARE EDUCATION/TRAINING PROGRAM

## 2019-06-12 PROCEDURE — 84439 ASSAY OF FREE THYROXINE: CPT | Performed by: STUDENT IN AN ORGANIZED HEALTH CARE EDUCATION/TRAINING PROGRAM

## 2019-06-12 PROCEDURE — 92526 ORAL FUNCTION THERAPY: CPT | Mod: GN

## 2019-06-12 PROCEDURE — 25000132 ZZH RX MED GY IP 250 OP 250 PS 637: Mod: GY

## 2019-06-12 PROCEDURE — 84443 ASSAY THYROID STIM HORMONE: CPT | Performed by: STUDENT IN AN ORGANIZED HEALTH CARE EDUCATION/TRAINING PROGRAM

## 2019-06-12 PROCEDURE — 25000131 ZZH RX MED GY IP 250 OP 636 PS 637: Mod: GY | Performed by: STUDENT IN AN ORGANIZED HEALTH CARE EDUCATION/TRAINING PROGRAM

## 2019-06-12 PROCEDURE — 25000128 H RX IP 250 OP 636: Performed by: STUDENT IN AN ORGANIZED HEALTH CARE EDUCATION/TRAINING PROGRAM

## 2019-06-12 PROCEDURE — 00000146 ZZHCL STATISTIC GLUCOSE BY METER IP

## 2019-06-12 PROCEDURE — 12000001 ZZH R&B MED SURG/OB UMMC

## 2019-06-12 PROCEDURE — 25000132 ZZH RX MED GY IP 250 OP 250 PS 637: Mod: GY | Performed by: STUDENT IN AN ORGANIZED HEALTH CARE EDUCATION/TRAINING PROGRAM

## 2019-06-12 PROCEDURE — G0500 MOD SEDAT ENDO SERVICE >5YRS: HCPCS | Performed by: INTERNAL MEDICINE

## 2019-06-12 PROCEDURE — 25000132 ZZH RX MED GY IP 250 OP 250 PS 637: Mod: GY | Performed by: HOSPITALIST

## 2019-06-12 PROCEDURE — 99233 SBSQ HOSP IP/OBS HIGH 50: CPT | Performed by: INTERNAL MEDICINE

## 2019-06-12 PROCEDURE — 92610 EVALUATE SWALLOWING FUNCTION: CPT | Mod: GN

## 2019-06-12 PROCEDURE — 43235 EGD DIAGNOSTIC BRUSH WASH: CPT | Performed by: INTERNAL MEDICINE

## 2019-06-12 PROCEDURE — 25000132 ZZH RX MED GY IP 250 OP 250 PS 637: Mod: GY | Performed by: INTERNAL MEDICINE

## 2019-06-12 PROCEDURE — 85027 COMPLETE CBC AUTOMATED: CPT | Performed by: STUDENT IN AN ORGANIZED HEALTH CARE EDUCATION/TRAINING PROGRAM

## 2019-06-12 RX ORDER — HYDROMORPHONE HYDROCHLORIDE 1 MG/ML
0.5 INJECTION, SOLUTION INTRAMUSCULAR; INTRAVENOUS; SUBCUTANEOUS EVERY 8 HOURS PRN
Status: DISCONTINUED | OUTPATIENT
Start: 2019-06-12 | End: 2019-06-14 | Stop reason: HOSPADM

## 2019-06-12 RX ORDER — FENTANYL CITRATE 50 UG/ML
INJECTION, SOLUTION INTRAMUSCULAR; INTRAVENOUS PRN
Status: DISCONTINUED | OUTPATIENT
Start: 2019-06-12 | End: 2019-06-12 | Stop reason: HOSPADM

## 2019-06-12 RX ORDER — SIMETHICONE
LIQUID (ML) MISCELLANEOUS PRN
Status: DISCONTINUED | OUTPATIENT
Start: 2019-06-12 | End: 2019-06-12 | Stop reason: HOSPADM

## 2019-06-12 RX ADMIN — PANTOPRAZOLE SODIUM 40 MG: 40 TABLET, DELAYED RELEASE ORAL at 07:58

## 2019-06-12 RX ADMIN — SUCRALFATE 1 G: 1 TABLET ORAL at 12:46

## 2019-06-12 RX ADMIN — HYDROMORPHONE HYDROCHLORIDE 0.5 MG: 1 INJECTION, SOLUTION INTRAMUSCULAR; INTRAVENOUS; SUBCUTANEOUS at 09:02

## 2019-06-12 RX ADMIN — METOCLOPRAMIDE HYDROCHLORIDE 10 MG: 10 TABLET ORAL at 07:58

## 2019-06-12 RX ADMIN — ONDANSETRON 4 MG: 4 TABLET, ORALLY DISINTEGRATING ORAL at 09:02

## 2019-06-12 RX ADMIN — SUCRALFATE 1 G: 1 TABLET ORAL at 21:47

## 2019-06-12 RX ADMIN — METOCLOPRAMIDE HYDROCHLORIDE 10 MG: 10 TABLET ORAL at 14:12

## 2019-06-12 RX ADMIN — PREGABALIN 150 MG: 75 CAPSULE ORAL at 07:57

## 2019-06-12 RX ADMIN — HYDROCORTISONE SODIUM SUCCINATE 40 MG: 100 INJECTION, POWDER, FOR SOLUTION INTRAMUSCULAR; INTRAVENOUS at 10:12

## 2019-06-12 RX ADMIN — LEVOTHYROXINE SODIUM 112 MCG: 112 TABLET ORAL at 07:58

## 2019-06-12 RX ADMIN — PREGABALIN 150 MG: 75 CAPSULE ORAL at 19:33

## 2019-06-12 RX ADMIN — HYDROCORTISONE 20 MG: 10 TABLET ORAL at 07:57

## 2019-06-12 RX ADMIN — POTASSIUM CHLORIDE 20 MEQ: 750 TABLET, EXTENDED RELEASE ORAL at 07:57

## 2019-06-12 RX ADMIN — HYDROCORTISONE 10 MG: 10 TABLET ORAL at 19:33

## 2019-06-12 RX ADMIN — TOPIRAMATE 100 MG: 100 TABLET, FILM COATED ORAL at 19:33

## 2019-06-12 RX ADMIN — DICYCLOMINE HYDROCHLORIDE 10 MG: 10 CAPSULE ORAL at 21:47

## 2019-06-12 RX ADMIN — PREGABALIN 150 MG: 75 CAPSULE ORAL at 14:12

## 2019-06-12 RX ADMIN — TOPIRAMATE 100 MG: 100 TABLET, FILM COATED ORAL at 07:58

## 2019-06-12 RX ADMIN — METOCLOPRAMIDE HYDROCHLORIDE 10 MG: 10 TABLET ORAL at 19:33

## 2019-06-12 RX ADMIN — SUCRALFATE 1 G: 1 TABLET ORAL at 16:49

## 2019-06-12 RX ADMIN — OXYCODONE HYDROCHLORIDE 5 MG: 5 TABLET ORAL at 07:57

## 2019-06-12 RX ADMIN — SUCRALFATE 1 G: 1 TABLET ORAL at 07:58

## 2019-06-12 RX ADMIN — HYDROMORPHONE HYDROCHLORIDE 0.5 MG: 1 INJECTION, SOLUTION INTRAMUSCULAR; INTRAVENOUS; SUBCUTANEOUS at 12:46

## 2019-06-12 RX ADMIN — DICYCLOMINE HYDROCHLORIDE 10 MG: 10 CAPSULE ORAL at 16:49

## 2019-06-12 RX ADMIN — TRAZODONE HYDROCHLORIDE 100 MG: 100 TABLET ORAL at 21:47

## 2019-06-12 RX ADMIN — PANTOPRAZOLE SODIUM 40 MG: 40 TABLET, DELAYED RELEASE ORAL at 19:33

## 2019-06-12 RX ADMIN — DULOXETINE HYDROCHLORIDE 90 MG: 60 CAPSULE, DELAYED RELEASE ORAL at 07:58

## 2019-06-12 ASSESSMENT — ACTIVITIES OF DAILY LIVING (ADL)
ADLS_ACUITY_SCORE: 13
ADLS_ACUITY_SCORE: 13
ADLS_ACUITY_SCORE: 12
ADLS_ACUITY_SCORE: 13

## 2019-06-12 ASSESSMENT — PAIN DESCRIPTION - DESCRIPTORS
DESCRIPTORS: SHARP;CONSTANT
DESCRIPTORS: ACHING;CONSTANT;DISCOMFORT

## 2019-06-12 NOTE — BRIEF OP NOTE
Gastroenterology Endoscopy [facility] Brief Operative Note    Procedure:  Upper endoscopy   Post-operative diagnosis:  Pyloric stenosis.   Staff Physician:  Dr. Jose Francisco Perdomo   Fellow/Assistant(s):  Dr. Edita Kirkland    Specimens:  Please see final procedure note for further details.   Findings:  Normal esophagus, irregular z-line, mild erythema at the GE junction but no active bleeding, pyloric steonsis with 2mm os, unable to traverse. Otherwise unremarkable exam.   Complications:  None.   Condition:  Stable   Recommendations  Diet:  Full liquid diet  PPI:  PPI po BID  Anti-coagulants/platelets:  N/A  Octreotide:  N/A  Discharge Planning:   -- PPI PO BID to be taken 30 minute prior to meals for the next 8 weeks then once daily thereafter  -- Liquid diet  -- Consider UGI series with SBFT   -- Will likely need repeat EGD with possible dilation and/or stenting pending UGI series  -- Additional recommendations per primary GI service

## 2019-06-12 NOTE — PLAN OF CARE
Admitted 6/10 central abdominal pain with a PMH of TPIAT (2012), appendectomy, cholecystectomy, adrenal insufficiency, migraine, and MDD/anxiety per MD note     Pain: Pt back/abdominal pain managed with PRN oxycodone and PRN IV Dilaudid   Nausea: intermittent, administered zofran with relief. Pt also receiving scheduled reglan  Lab: BS checks before meals, no coverage needed  /68 (BP Location: Right arm)   Pulse 72   Temp 98.2  F (36.8  C)   Resp 12   Wt 62.8 kg (138 lb 8 oz)   SpO2 95%   BMI 25.33 kg/m     Output: Adequately voiding   Diet: Full liquids   Activity: SBA   Bowel Function: Bowel sounds heard in all quadrants, passing flatus, no bm this shift   Lines: R PIV, saline locked, dressing CDI   Plan: EGD completed today. Continue to monitor pain, nausea, VS, output, and bowel function

## 2019-06-12 NOTE — PLAN OF CARE
Discharge Planner SLP   Patient plan for discharge: Unknown  Current status: Clinical swallow eval completed per MD order. Pt presents with functional swallow for thin liquids and puree consistency; unable to assess higher texture solids per GI recs for full liquid diet. Pt's primary complaint is dysphagia to solids. Oral mech exam unremarkable. Assessed with thin liquids and puree; no overt s/sx of aspiration. Recommend continuation of full liquid diet; ongoing SLP eval warranted when pt is cleared for higher texture solids. Ensure pt is fully alert and upright for all PO, taking small bites/sips at slow rate. Alternate solids and liquids, and remain upright for 30 minutes after PO. SLP to follow.  Barriers to return to prior living situation: Restricted diet, medical condition  Recommendations for discharge: Defer to MD  Rationale for recommendations: Anticipate pt will meet all ST goals prior to discharge; subjective dysphagia appears primarily esophageal in nature       Entered by: Tamy Puckett 06/12/2019 5:10 PM

## 2019-06-12 NOTE — PROGRESS NOTES
"    [facility], [place]    Progress Note - Sydni 1 Service        Date of Admission:  6/10/2019    Assessment & Plan   Chantell Kidd is a 53F presenting with central abdominal pain with a PMH of TPIAT (2012), appendectomy, cholecystectomy, adrenal insufficiency, migraine, and MDD/anxiety.    Changes today:  - EGD today  - ADAT after procedure  - Endocrine recs     Odynophagia  Abdominal pain  Lactic acidosis  S/p TPIAT (2012), appendectomy, cholecystectomy  Etiologies of upper GI bleed could include PUD, gastropathy, esophagitis, vascular lesions (AVM, gave, etc.), or MWT. No recent EtOH or NSAIDs. No diarrhea today with only two stools. Given her extensive abdominal surgeries, adhesions is always a consideration.   - GI consult, recs appreciated      - EGD 6/12.   - Continue pantoprazole 40 mg twice daily   - Tylenol for pain, dilaudid 1mg q8hr for breakthrough, will consider discontinue giving minimal use   - Trend hemoglobin daily while not having overt melena/VSS stable   - Continue PTA bentyl   - Hold ASA 81 for now    T1DM, hypoglycemia  S/p TPIAT with loss of most of endocrine function, on home insulin pump. A1C 4/18/19 was 9.6. Initial glucose 219 on admission then later was 34 without any insulin given but likely her home insulin pump was still running. Spoke with endocrine and they will see her tomorrow and for now will do 8U degludec, D5 drip at 50cc/hr and check glucose q4hr with low ISS. Not taking PO.   - Endocrine consult   - 8U degludec   - correction coverage: Novolog Insulin subcutaneous before meals and HS.                   - 279: give 1 unit                 - 379: give 2 unit                 - 479: give 3 unit   - carb coverage, 1U novolog/30 U carbs   - insulin pump off currently, will restart when endocrine recommends    Adrenal insufficiency  - Endocrine consult  - Has been on lasix 20mg BID for \"fluid retention likely related to her steroid use\" " since 2016 (normal echo that year); no edema now and will continue given good BP  - Increase hydrocortisone to 20mg AM/10mg PM for 3 days, then decrease to PTA dosing    Anxiety/MDD  - PTA duloxetine, topiramate, trazodone    Chronic pain  - PTA pregabalin     Diet: Snacks/Supplements Adult: Boost Breeze; Between Meals  NPO for Medical/Clinical Reasons Except for: Meds    Fluids: PRN, oral  Lines: PIV  DVT Prophylaxis: Pneumatic Compression Devices  Mckee Catheter: not present  Code Status: Full Code      Disposition Plan   Expected discharge: Tomorrow, recommended to prior living arrangement once adequate pain management/ tolerating PO medications and hemoglobin stable.  Entered: Sonia Severino MD 06/12/2019, 11:49 AM     The patient's care was discussed with the Attending Physician, Dr. Moon.    Sonia Severino MD  [address], [place]  Pager: [phone]  Please see sticky note for cross cover information  ______________________________________________________________________    Interval History   No events overnight. No melenotic stools. Feeling much improved. Would like to try eating after EGD. Hopeful to be able to leave tomorrow. Not requiring much IV pain meds.     Data reviewed today: I reviewed all medications, new labs and imaging results over the last 24 hours. I personally reviewed no images or EKG's today.    Physical Exam   Vital Signs: Temp: 98.2  F (36.8  C) Temp src: Oral BP: 109/68 Pulse: 72 Heart Rate: 71 Resp: 12 SpO2: 95 % Weight: 138 lbs 8 oz  General Appearance: Alert, comfortable,   Respiratory: CTAB  Cardiovascular: RRR no appreciable murmur  GI: Soft, NTND normoactive bowel sounds. Non tender in epigastric area, Tender on deeper pressure mid abd. No gaurding or rebound  Skin: Some scattered healed ecchymoses    Data   Recent Labs   Lab 06/12/19  0801 06/11/19  1821 06/11/19  0633 06/10/19  1031   WBC 7.7  --  7.2 7.6   HGB 11.4* 10.1* 9.5* 10.8*    MCV 95  --  96 93     --  290 350   INR  --   --   --  0.99     --  140 141   POTASSIUM 4.2  --  3.6 3.8   CHLORIDE 105  --  107 106   CO2 28  --  28 26   BUN 8  --  6* 10   CR 0.91  --  0.82 0.71   ANIONGAP 6  --  5 9   ELIZA 9.1  --  8.7 9.0   GLC 92  --  142* 219*   ALBUMIN  --   --  3.2* 3.8   PROTTOTAL  --   --  6.0* 7.0   BILITOTAL  --   --  1.0 0.4   ALKPHOS  --   --  90 105   ALT  --   --  44 59*   AST  --   --  37 61*   LIPASE  --   --   --  21*     Pt was seen and examined by me; confirmed abdominal exam findings, reviewed labs, vitals, imaging.  I agree with the detailed A/P documentation above.  EGD today.    Tonie Moon MD

## 2019-06-12 NOTE — OR NURSING
Procedure: EGD  Sedation: Conscious sedation (50 mg fentanyl, 1 mcg versed)  O2: 2 LPM NC  Tolerated: well. VS stable during and post procedure. ETCO2 monitored continuously. Not c/o abdominal or chest pain  Specimens: 0 specimen jar(s) sent to lab  Report: Given to staff CARLOS Martínez on 7 C  Escorted to and from patient care unit  by patient transport via litter

## 2019-06-12 NOTE — PROGRESS NOTES
06/12/19 1654   General Information   Onset Date 06/10/19   Start of Care Date 06/12/19   Referring Physician Durga Brown MD   Patient Profile Review/OT: Additional Occupational Profile Info See Profile for full history and prior level of function   Patient/Family Goals Statement None stated   Swallowing Evaluation Bedside swallow evaluation   Behaviorial Observations WFL (within functional limits)   Mode of current nutrition Oral diet   Type of oral diet Thin liquid  (Full liquids)   Respiratory Status Room air   Comments SLP: Pt is a 52 yo female presenting with central abdominal pain with a PMH of TPIAT (2012), appendectomy, cholecystectomy, adrenal insufficiency, migraine, and MDD/anxiety. Pt is familiar to this service, seen in the past with c/o globus sensation and other esophageal dysphagia complaints. Regular diet/thin liquids has consistently been recommended. Per interview today, pt denies coughing/choking; she endorses globus sensation in throat with solids such as bread and meat. Per GI, pt cleared for up to full liquid diet (EGD completed earlier today). Limited swallow eval completed per MD order.   Clinical Swallow Evaluation   Oral Musculature generally intact   Structural Abnormalities none present   Dentition present and adequate   Mucosal Quality good   Mandibular Strength and Mobility intact   Oral Labial Strength and Mobility WFL   Lingual Strength and Mobility WFL   Buccal Strength and Mobility intact   Laryngeal Function Cough;Throat clear;Swallow;Voicing initiated;Dry swallow palpated   Oral Musculature Comments Grossly functional   Swallow Eval   Feeding Assistance no assistance needed   Clinical Swallow Eval: Thin Liquid Texture Trial   Mode of Presentation, Thin Liquids cup;straw;self-fed   Volume of Liquid or Food Presented 3oz water   Oral Phase of Swallow WFL   Pharyngeal Phase of Swallow intact   Diagnostic Statement WFL, no overt s/sx of aspiration   Clinical Swallow  Eval: Puree Solid Texture Trial   Mode of Presentation, Puree spoon;self-fed   Volume of Puree Presented 3oz pudding   Oral Phase, Puree WFL   Pharyngeal Phase, Puree intact   Diagnostic Statement WFL, no overt s/sx of aspiration. Pt denied globus sensation   Clinical Swallow Eval: Solid Food Texture Trial   Diagnostic Statement Unable to assess; pt on full liquid diet per GI   Esophageal Phase of Swallow   Patient reports or presents with symptoms of esophageal dysphagia Yes   Esophageal comments Hx of esophageal dysphagia complaints. Reports globus sensation with solids. Hx of reflux.   General Therapy Interventions   Planned Therapy Interventions Dysphagia Treatment   Dysphagia treatment Modified diet education;Instruction of safe swallow strategies;Compensatory strategies for swallowing   Swallow Eval: Clinical Impressions   Skilled Criteria for Therapy Intervention   (Further evaluation warranted given limited trials today)   Treatment Diagnosis Suspected esophageal dysphagia   Diet texture recommendations Full liquid;Thin liquids   Recommended Feeding/Eating Techniques maintain upright posture during/after eating for 30 mins;alternate between small bites and sips of food/liquid;small sips/bites  (slow rate)   Demonstrates Need for Referral to Another Service   (GI)   Therapy Frequency 3 times/wk   Predicted Duration of Therapy Intervention (days/wks) 1 week   Anticipated Discharge Disposition home   Risks and Benefits of Treatment have been explained. Yes   Patient, family and/or staff in agreement with Plan of Care Yes   Clinical Impression Comments SLP: Clinical swallow eval completed per MD order. Pt presents with functional swallow for thin liquids and puree consistency; unable to assess higher texture solids per GI recs for full liquid diet. Pt's primary complaint is dysphagia to solids. Oral mech exam unremarkable. Assessed with thin liquids and puree; no overt s/sx of aspiration. Recommend continuation  of full liquid diet; ongoing SLP eval warranted when pt is cleared for higher texture solids. Ensure pt is fully alert and upright for all PO, taking small bites/sips at slow rate. Alternate solids and liquids, and remain upright for 30 minutes after PO. SLP to follow.   Total Evaluation Time   Total Evaluation Time (Minutes) 10

## 2019-06-12 NOTE — PLAN OF CARE
AVSS. UAL. PO pain meds given at HS, appears to be comfortable and has slept.   MIVF discontinued.  Clear liquid diet until 0000, pt not taking much PO except with meds.  Able to swallow pills with no difficulty.  Voided in evening, did not save.  BGs 118-148, insulin pump still paused.  PLAN: Monitor BGs, EGD today (possibly 1000)?  There is a solu-cortef injection on MAR that needs to be given prior to EGD.

## 2019-06-12 NOTE — PLAN OF CARE
SLP: Cancel - ST orders received for swallow eval. Pt NPO this AM for EGD. SLP will reschedule as appropriate.

## 2019-06-13 ENCOUNTER — APPOINTMENT (OUTPATIENT)
Dept: GENERAL RADIOLOGY | Facility: CLINIC | Age: 54
DRG: 378 | End: 2019-06-13
Payer: MEDICARE

## 2019-06-13 ENCOUNTER — APPOINTMENT (OUTPATIENT)
Dept: SPEECH THERAPY | Facility: CLINIC | Age: 54
DRG: 378 | End: 2019-06-13
Payer: MEDICARE

## 2019-06-13 LAB
ALBUMIN UR-MCNC: NEGATIVE MG/DL
APPEARANCE UR: CLEAR
BILIRUB UR QL STRIP: NEGATIVE
COLOR UR AUTO: ABNORMAL
ERYTHROCYTE [DISTWIDTH] IN BLOOD BY AUTOMATED COUNT: 13.3 % (ref 10–15)
GLUCOSE BLDC GLUCOMTR-MCNC: 122 MG/DL (ref 70–99)
GLUCOSE BLDC GLUCOMTR-MCNC: 152 MG/DL (ref 70–99)
GLUCOSE BLDC GLUCOMTR-MCNC: 170 MG/DL (ref 70–99)
GLUCOSE BLDC GLUCOMTR-MCNC: 172 MG/DL (ref 70–99)
GLUCOSE BLDC GLUCOMTR-MCNC: 195 MG/DL (ref 70–99)
GLUCOSE BLDC GLUCOMTR-MCNC: 261 MG/DL (ref 70–99)
GLUCOSE BLDC GLUCOMTR-MCNC: 92 MG/DL (ref 70–99)
GLUCOSE UR STRIP-MCNC: 30 MG/DL
HCT VFR BLD AUTO: 33.2 % (ref 35–47)
HGB BLD-MCNC: 10.5 G/DL (ref 11.7–15.7)
HGB UR QL STRIP: NEGATIVE
KETONES UR STRIP-MCNC: NEGATIVE MG/DL
LACTATE BLD-SCNC: 0.8 MMOL/L (ref 0.7–2)
LEUKOCYTE ESTERASE UR QL STRIP: ABNORMAL
MCH RBC QN AUTO: 29.8 PG (ref 26.5–33)
MCHC RBC AUTO-ENTMCNC: 31.6 G/DL (ref 31.5–36.5)
MCV RBC AUTO: 94 FL (ref 78–100)
NITRATE UR QL: NEGATIVE
PH UR STRIP: 7 PH (ref 5–7)
PLATELET # BLD AUTO: 334 10E9/L (ref 150–450)
RBC # BLD AUTO: 3.52 10E12/L (ref 3.8–5.2)
SOURCE: ABNORMAL
SP GR UR STRIP: 1 (ref 1–1.03)
UPPER GI ENDOSCOPY: NORMAL
UROBILINOGEN UR STRIP-MCNC: NORMAL MG/DL (ref 0–2)
WBC # BLD AUTO: 13.7 10E9/L (ref 4–11)

## 2019-06-13 PROCEDURE — 99233 SBSQ HOSP IP/OBS HIGH 50: CPT | Performed by: INTERNAL MEDICINE

## 2019-06-13 PROCEDURE — 25000132 ZZH RX MED GY IP 250 OP 250 PS 637: Mod: GY | Performed by: HOSPITALIST

## 2019-06-13 PROCEDURE — 0DJ08ZZ INSPECTION OF UPPER INTESTINAL TRACT, VIA NATURAL OR ARTIFICIAL OPENING ENDOSCOPIC: ICD-10-PCS | Performed by: INTERNAL MEDICINE

## 2019-06-13 PROCEDURE — 92526 ORAL FUNCTION THERAPY: CPT | Mod: GN

## 2019-06-13 PROCEDURE — 83605 ASSAY OF LACTIC ACID: CPT | Performed by: STUDENT IN AN ORGANIZED HEALTH CARE EDUCATION/TRAINING PROGRAM

## 2019-06-13 PROCEDURE — 85027 COMPLETE CBC AUTOMATED: CPT | Performed by: STUDENT IN AN ORGANIZED HEALTH CARE EDUCATION/TRAINING PROGRAM

## 2019-06-13 PROCEDURE — 25000128 H RX IP 250 OP 636: Performed by: STUDENT IN AN ORGANIZED HEALTH CARE EDUCATION/TRAINING PROGRAM

## 2019-06-13 PROCEDURE — 36415 COLL VENOUS BLD VENIPUNCTURE: CPT | Performed by: STUDENT IN AN ORGANIZED HEALTH CARE EDUCATION/TRAINING PROGRAM

## 2019-06-13 PROCEDURE — 40000141 ZZH STATISTIC PERIPHERAL IV START W/O US GUIDANCE

## 2019-06-13 PROCEDURE — 25000132 ZZH RX MED GY IP 250 OP 250 PS 637: Mod: GY

## 2019-06-13 PROCEDURE — 12000001 ZZH R&B MED SURG/OB UMMC

## 2019-06-13 PROCEDURE — 74245 XR UPPER GI W SMALL BOWEL FOLLOW THROUGH: CPT

## 2019-06-13 PROCEDURE — 81003 URINALYSIS AUTO W/O SCOPE: CPT

## 2019-06-13 PROCEDURE — 25000131 ZZH RX MED GY IP 250 OP 636 PS 637: Mod: GY | Performed by: STUDENT IN AN ORGANIZED HEALTH CARE EDUCATION/TRAINING PROGRAM

## 2019-06-13 PROCEDURE — 25800030 ZZH RX IP 258 OP 636: Performed by: STUDENT IN AN ORGANIZED HEALTH CARE EDUCATION/TRAINING PROGRAM

## 2019-06-13 PROCEDURE — 25000132 ZZH RX MED GY IP 250 OP 250 PS 637: Mod: GY | Performed by: STUDENT IN AN ORGANIZED HEALTH CARE EDUCATION/TRAINING PROGRAM

## 2019-06-13 PROCEDURE — 87086 URINE CULTURE/COLONY COUNT: CPT

## 2019-06-13 PROCEDURE — 00000146 ZZHCL STATISTIC GLUCOSE BY METER IP

## 2019-06-13 RX ADMIN — SUCRALFATE 1 G: 1 TABLET ORAL at 22:06

## 2019-06-13 RX ADMIN — DULOXETINE HYDROCHLORIDE 90 MG: 60 CAPSULE, DELAYED RELEASE ORAL at 08:52

## 2019-06-13 RX ADMIN — TOPIRAMATE 100 MG: 100 TABLET, FILM COATED ORAL at 20:53

## 2019-06-13 RX ADMIN — ACETAMINOPHEN 650 MG: 325 TABLET, FILM COATED ORAL at 16:51

## 2019-06-13 RX ADMIN — PREGABALIN 150 MG: 75 CAPSULE ORAL at 08:52

## 2019-06-13 RX ADMIN — OXYCODONE HYDROCHLORIDE 5 MG: 5 TABLET ORAL at 08:53

## 2019-06-13 RX ADMIN — ONDANSETRON 4 MG: 4 TABLET, ORALLY DISINTEGRATING ORAL at 19:10

## 2019-06-13 RX ADMIN — HYDROMORPHONE HYDROCHLORIDE 0.5 MG: 1 INJECTION, SOLUTION INTRAMUSCULAR; INTRAVENOUS; SUBCUTANEOUS at 12:26

## 2019-06-13 RX ADMIN — SUCRALFATE 1 G: 1 TABLET ORAL at 16:50

## 2019-06-13 RX ADMIN — TRAZODONE HYDROCHLORIDE 200 MG: 100 TABLET ORAL at 22:06

## 2019-06-13 RX ADMIN — DICYCLOMINE HYDROCHLORIDE 10 MG: 10 CAPSULE ORAL at 08:53

## 2019-06-13 RX ADMIN — DICYCLOMINE HYDROCHLORIDE 10 MG: 10 CAPSULE ORAL at 16:51

## 2019-06-13 RX ADMIN — SODIUM CHLORIDE, POTASSIUM CHLORIDE, SODIUM LACTATE AND CALCIUM CHLORIDE 1000 ML: 600; 310; 30; 20 INJECTION, SOLUTION INTRAVENOUS at 14:46

## 2019-06-13 RX ADMIN — SODIUM CHLORIDE, POTASSIUM CHLORIDE, SODIUM LACTATE AND CALCIUM CHLORIDE 1000 ML: 600; 310; 30; 20 INJECTION, SOLUTION INTRAVENOUS at 06:54

## 2019-06-13 RX ADMIN — OXYCODONE HYDROCHLORIDE 5 MG: 5 TABLET ORAL at 20:53

## 2019-06-13 RX ADMIN — SUCRALFATE 1 G: 1 TABLET ORAL at 12:33

## 2019-06-13 RX ADMIN — ACETAMINOPHEN 650 MG: 325 TABLET, FILM COATED ORAL at 20:53

## 2019-06-13 RX ADMIN — TOPIRAMATE 100 MG: 100 TABLET, FILM COATED ORAL at 08:53

## 2019-06-13 RX ADMIN — METOCLOPRAMIDE HYDROCHLORIDE 10 MG: 10 TABLET ORAL at 20:53

## 2019-06-13 RX ADMIN — SUCRALFATE 1 G: 1 TABLET ORAL at 08:53

## 2019-06-13 RX ADMIN — SODIUM CHLORIDE, POTASSIUM CHLORIDE, SODIUM LACTATE AND CALCIUM CHLORIDE 500 ML: 600; 310; 30; 20 INJECTION, SOLUTION INTRAVENOUS at 13:27

## 2019-06-13 RX ADMIN — OXYCODONE HYDROCHLORIDE 5 MG: 5 TABLET ORAL at 00:57

## 2019-06-13 RX ADMIN — PREGABALIN 150 MG: 75 CAPSULE ORAL at 20:53

## 2019-06-13 RX ADMIN — PANTOPRAZOLE SODIUM 40 MG: 40 TABLET, DELAYED RELEASE ORAL at 20:52

## 2019-06-13 RX ADMIN — POTASSIUM CHLORIDE 20 MEQ: 750 TABLET, EXTENDED RELEASE ORAL at 08:53

## 2019-06-13 RX ADMIN — LEVOTHYROXINE SODIUM 112 MCG: 112 TABLET ORAL at 08:52

## 2019-06-13 RX ADMIN — METOCLOPRAMIDE HYDROCHLORIDE 10 MG: 10 TABLET ORAL at 08:52

## 2019-06-13 RX ADMIN — METOCLOPRAMIDE HYDROCHLORIDE 10 MG: 10 TABLET ORAL at 13:34

## 2019-06-13 RX ADMIN — HYDROCORTISONE 20 MG: 10 TABLET ORAL at 08:52

## 2019-06-13 RX ADMIN — HYDROCORTISONE 10 MG: 10 TABLET ORAL at 20:53

## 2019-06-13 RX ADMIN — PREGABALIN 150 MG: 75 CAPSULE ORAL at 13:34

## 2019-06-13 RX ADMIN — OXYCODONE HYDROCHLORIDE 5 MG: 5 TABLET ORAL at 16:50

## 2019-06-13 RX ADMIN — PANTOPRAZOLE SODIUM 40 MG: 40 TABLET, DELAYED RELEASE ORAL at 08:53

## 2019-06-13 ASSESSMENT — PAIN DESCRIPTION - DESCRIPTORS
DESCRIPTORS: SHARP;DISCOMFORT
DESCRIPTORS: ACHING
DESCRIPTORS: ACHING;DISCOMFORT
DESCRIPTORS: ACHING
DESCRIPTORS: ACHING;DISCOMFORT

## 2019-06-13 ASSESSMENT — ACTIVITIES OF DAILY LIVING (ADL)
ADLS_ACUITY_SCORE: 12
ADLS_ACUITY_SCORE: 13
ADLS_ACUITY_SCORE: 12
ADLS_ACUITY_SCORE: 13

## 2019-06-13 NOTE — PROGRESS NOTES
[facility], [place]    Progress Note - Sydni 1 Service        Date of Admission:  6/10/2019    Assessment & Plan   Chantell Kidd is a 53F presenting with central abdominal pain with a PMH of TPIAT (2012), appendectomy, cholecystectomy, adrenal insufficiency, migraine, and MDD/anxiety. Found to have pyloric stenosis on EDG 6/13/19    Changes today:  -UGI w/ SBFT  -1L LR mIVF for hypotension in AM    Odynophagia  Abdominal pain  Lactic acidosis  S/p TPIAT (2012), appendectomy, cholecystectomy  Etiologies of upper GI bleed could include PUD, gastropathy, esophagitis, vascular lesions (AVM, gave, etc.), or MWT. No recent EtOH or NSAIDs. No diarrhea today with only two stools. Given her extensive abdominal surgeries, adhesions is always a consideration.  - GI consult, recs appreciated   -EGD 6/12: pyloric stenosis   -PPI PO BID 30min prior to meals for next 8wks, then once thereafter   -UGI w/ SBFT   -Repeat EGD w/ dilation +/- stenting pending SBFT  - Continue pantoprazole 40 mg twice daily  - Tylenol for pain, dilaudid 1mg q8hr for breakthrough, will consider discontinue giving minimal use  - Trend hemoglobin daily while not having overt melena/VSS stable  - Continue PTA bentyl  - Hold ASA 81 for now    T1DM, hypoglycemia  A1c 8.2% S/p TPIAT with loss of most of endocrine function, on home insulin pump. A1C 4/18/19 was 9.6. Initial glucose 219 on admission then later was 34 without any insulin given but likely her home insulin pump was still running. Spoke with endocrine and they will see her tomorrow and for now will do 8U degludec, D5 drip at 50cc/hr and check glucose q4hr with low ISS. Not taking PO. Considering SGLT2 inhibitor  - Endocrine consult  - 8U degludec  - correction coverage: Novolog Insulin subcutaneous before meals and HS.                   - 279: give 1 unit                 - 379: give 2 unit                 - 479: give 3 unit  - carb coverage, 1U  "novolog/30 U carbs  - insulin pump off currently, will restart when endocrine recommends    Adrenal insufficiency  - Endocrine consult  - Has been on lasix 20mg BID for \"fluid retention likely related to her steroid use\" since 2016 (normal echo that year); no edema now but holding furosemide in setting of hypotension  - Increase hydrocortisone to 20mg AM/10mg PM for 3 days, then decrease to PTA dosing prior to discharge    Anxiety/MDD  - PTA duloxetine, topiramate, trazodone    Chronic pain  - PTA pregabalin     Diet: Snacks/Supplements Adult: Boost Breeze; Between Meals  Full Liquid Diet    Fluids: PRN, oral  Lines: PIV  DVT Prophylaxis: Pneumatic Compression Devices  Mckee Catheter: not present  Code Status: Full Code      Disposition Plan   Expected discharge: Tomorrow, recommended to prior living arrangement once adequate pain management/ tolerating PO medications and hemoglobin stable.  Entered: Paty Caicedo MD 06/13/2019, 7:19 AM     The patient's care was discussed with the Attending Physician, Dr. Moon.    Paty Caicedo MD, MPH  [place] [address] Service  [facility], [place]  Pager: [phone]  Please see sticky note for cross cover information  ______________________________________________________________________    Interval History   No events overnight. No stools since admission. Denies SOB, chest pain, dysuria, cough. Abd pain continues (localized now sub costal). Some bladder pressure/dullness.    Data reviewed today: I reviewed all medications, new labs and imaging results over the last 24 hours. I personally reviewed no images or EKG's today.    Physical Exam   Vital Signs: Temp: 97.5  F (36.4  C) Temp src: Oral BP: (!) 88/52 Pulse: 75 Heart Rate: 64 Resp: 16 SpO2: 94 % Weight: 138 lbs 8 oz  General Appearance: Alert, comfortable  Respiratory: CTAB  Cardiovascular: RRR no appreciable murmur  GI: Soft, NTND normoactive bowel sounds. Non tender in epigastric area, Tender " on deeper pressure mid abd and below lower ribcage. No gaurding or rebound  Skin: Some scattered healed ecchymoses    Data   Recent Labs   Lab 06/13/19  0656 06/12/19  0801 06/11/19  1821 06/11/19  0633 06/10/19  1031   WBC 13.7* 7.7  --  7.2 7.6   HGB 10.5* 11.4* 10.1* 9.5* 10.8*   MCV 94 95  --  96 93    340  --  290 350   INR  --   --   --   --  0.99   NA  --  140  --  140 141   POTASSIUM  --  4.2  --  3.6 3.8   CHLORIDE  --  105  --  107 106   CO2  --  28  --  28 26   BUN  --  8  --  6* 10   CR  --  0.91  --  0.82 0.71   ANIONGAP  --  6  --  5 9   ELIZA  --  9.1  --  8.7 9.0   GLC  --  92  --  142* 219*   ALBUMIN  --   --   --  3.2* 3.8   PROTTOTAL  --   --   --  6.0* 7.0   BILITOTAL  --   --   --  1.0 0.4   ALKPHOS  --   --   --  90 105   ALT  --   --   --  44 59*   AST  --   --   --  37 61*   LIPASE  --   --   --   --  21*     Pt was seen and examined by me; confirmed abdominal exam findings, reviewed labs, vitals, imaging.  I agree with the detailed A/P documentation above including upper GI series.    Tonie Moon MD

## 2019-06-13 NOTE — PLAN OF CARE
VSS- soft BP, order parameters not met to notify. Pt controlled with prn oxycodone. PIV x 1 saline locked. Incontinent of urine x1. No BM overnight, passing gas. Full liquid diet- denies nausea. SBA. Pt slept soundly. Continue with plan of care.

## 2019-06-13 NOTE — PLAN OF CARE
Patient denies pain, no nausea, tolerating full liquid diet. Pt ate 100% of her dinner meal. EGD done today, PIV in place, pt is voiding and ambulating without difficulty, reports positive flatus, no BM, call light within reach.

## 2019-06-13 NOTE — PLAN OF CARE
Discharge Planner SLP   Patient plan for discharge: Unknown  Current status: Pt with good tolerance of higher texture solids, puree, and thin liquids in today's session. Pt endorsed globus sensation at level of sternal notch with solids, which was eliminated with liquid wash. This presentation is consistent with baseline. No oropharyngeal swallowing concerns; no s/sx of aspiration. Subjective dysphagia appears to be esophageal in nature (dysphagia to solids only, globus sensation, hx of abnormal esophagram). Pt clear for regular diet/thin liquids from SLP perspective; GI to advance solids as medically appropriate. Pt to be fully alert and upright for all PO, taking small bites/sips at slow rate. Remain upright for 30 minutes after eating, alternate solids and liquids. No additional SLP services indicated.   Barriers to return to prior living situation: None from ST perspective  Recommendations for discharge: Defer to MD  Rationale for recommendations: Functional oropharyngeal swallow mechanism; pt's dysphagia appears to be esophageal in nature       Entered by: Tamy Puckett 06/13/2019 2:20 PM     Speech Language Therapy Discharge Summary    Reason for therapy discharge:    All goals and outcomes met, no further needs identified.    Progress towards therapy goal(s). See goals on Care Plan in [facility] electronic health record for goal details.  Goals met    Therapy recommendation(s):    No further therapy is recommended. See above.

## 2019-06-13 NOTE — PLAN OF CARE
SLP: Cancel - Attempted to see pt for dysphagia management. Per RN and chart review, pt made NPO this AM for procedure at 11. SLP will reschedule as appropriate. GI to clear pt for trials of higher texture solids.

## 2019-06-13 NOTE — PROGRESS NOTES
Endocrinology Consult - Progress Note  Date of Service: June 13, 2019  Patient: Chantell Kidd (MRN [id number])      Recommendations:  - Correction coverage: Novolog Insulin subcutaneous before meals and HS.                   - 279: give 1 unit                 - 379: give 2 unit                 - 479: give 3 unit  - Carb Coverage for meals and snacks : Novolog 1 unit for every 30g carbohydrate  - Glucose checks: TID AC, HS, 2AM.    - Insulin pump discontinued and removed  - Continue Hydrocortisone 20mg PO in AM; 10mg PO in PM today. If hemodynamically stable, we will likely reduce back to pre-admission dose of 10mg AM and 5mg PM in the next 1-2 days      Assessment  Diabetes Mellitus (A1C of 8.2%)   S/P Total Pancreatectomy with Islet Autotransplantation in 2012. Partial islet graft function.   The transiently elevated blood sugars last night are likely secondary to the high-dose of steroids she received in preparation for her EGD. Her sugars corrected with a low amount of insulin in the past 24 hours (2units correction; 1 unit carb coverage). Her AM sugars are  this morning. Given the fact that she has received minimal insulin over the past 48 hours without going into DKA or persistently elevated blood sugars, she appears to be making some endogenous insulin. Her PO intake has been limited this hospitalization, so it is possible that her blood sugars may increase as she resumes a regular diet. Will continue to monitor and assess insulin needs for glycemic control.     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 Maher and was on Hydrocortisone 10mg/5mg prior to admission. Given her hypotension this morning, we recommend continuing stress-dose steroids today. Will continue to monitor her hemodynamic stability and will likely reduce her hydrocortisone back to her home dose in the next 1-2 days.     Hypothyroidism  At home, patient is on Levothyroxine 112  mcg/day with reported missed doses prior to admission. Thyroid function labs were normal on 06/12, therefore, unlikely contributing to her current glycemic control.       The patient was discussed with Endocrinology attending, Dr. Rosy Hawthorne, MS4  [employer] [facility] [employer]    Attending tie-in statement: Chart reviewed. Patient not seen by me today.   Discussed with student whose note I have reviewed and amended.  Meliza Gallegos MD      Interval History:  - She received Hydrocortisone 40mg IV yesterday morning ahead of the EGD for management of her possible central adrenal insufficiency. She tolerated this well. Her blood sugars transiently increased to 190s-220s last night but improved with minimal insulin.   - She is on a regular thin liquid diet, and she was able to eat a small amount of pudding and ice cream yesterday. She has a decreased appetite secondary to abdominal pain and nausea.  - BP was low this AM for which the primary team fluid resuscitated with LR boluses  - She reports diffuse abdominal pain and nausea this morning    Total daily insulin requirement :  6/12: 2 units  6/11 2 units    Labs from 6/12/19: TSH 3.53, free T4 1.04    Physical Examination  Vitals: BP (!) 88/52   Pulse 75   Temp 97.5  F (36.4  C) (Oral)   Resp 16   Wt 62.8 kg (138 lb 8 oz)   SpO2 94%   BMI 25.33 kg/m       General: siting up in bed, calm, conversant, no acute distress  HEENT: normocephalic, atraumatic, anicteric, MMM  Lungs: Clear to auscultation bilaterally; no crackles or wheezes. Non-labored breathing on room air.  CV: Regular rate and rhythm. No murmur.  Abdomen: Soft, non-distended. Diffuse abdominal tenderness to palpation without rebound or guarding.  Skin: Warm and dry, no obvious lesions  MSK: Normal muscle bulk  Lymph: No lower extremity edema  Psych: Calm, appropriate mood and affect. Fluent and coherent speech.     Labs:    Results for orders placed or performed during the  hospital encounter of 06/10/19 (from the past 24 hour(s))   Glucose by meter   Result Value Ref Range    Glucose 123 (H) 70 - 99 mg/dL   Glucose by meter   Result Value Ref Range    Glucose 195 (H) 70 - 99 mg/dL   Glucose by meter   Result Value Ref Range    Glucose 228 (H) 70 - 99 mg/dL   Glucose by meter   Result Value Ref Range    Glucose 195 (H) 70 - 99 mg/dL   Glucose by meter   Result Value Ref Range    Glucose 172 (H) 70 - 99 mg/dL   Glucose by meter   Result Value Ref Range    Glucose 122 (H) 70 - 99 mg/dL   CBC with platelets   Result Value Ref Range    WBC 13.7 (H) 4.0 - 11.0 10e9/L    RBC Count 3.52 (L) 3.8 - 5.2 10e12/L    Hemoglobin 10.5 (L) 11.7 - 15.7 g/dL    Hematocrit 33.2 (L) 35.0 - 47.0 %    MCV 94 78 - 100 fl    MCH 29.8 26.5 - 33.0 pg    MCHC 31.6 31.5 - 36.5 g/dL    RDW 13.3 10.0 - 15.0 %    Platelet Count 334 150 - 450 10e9/L   Lactic acid whole blood   Result Value Ref Range    Lactic Acid 0.8 0.7 - 2.0 mmol/L   Glucose by meter   Result Value Ref Range    Glucose 92 70 - 99 mg/dL     *Note: Due to a large number of results and/or encounters for the requested time period, some results have not been displayed. A complete set of results can be found in Results Review.        Ref. Range 6/12/2019 08:01   Sodium Latest Ref Range: 133 - 144 mmol/L 140   Potassium Latest Ref Range: 3.4 - 5.3 mmol/L 4.2   Chloride Latest Ref Range: 94 - 109 mmol/L 105   Carbon Dioxide Latest Ref Range: 20 - 32 mmol/L 28   Urea Nitrogen Latest Ref Range: 7 - 30 mg/dL 8   Creatinine Latest Ref Range: 0.52 - 1.04 mg/dL 0.91   GFR Estimate Latest Ref Range: >60 mL/min/1.73_m2 71   GFR Estimate If Black Latest Ref Range: >60 mL/min/1.73_m2 82   Calcium Latest Ref Range: 8.5 - 10.1 mg/dL 9.1   Anion Gap Latest Ref Range: 3 - 14 mmol/L 6     Diabetes Control:         Lab Results   Component Value Date     A1C 8.2 06/11/2019     A1C Canceled, Test credited 06/10/2019     A1C 9.6 04/18/2019     A1C 6.6 08/27/2018     A1C 7.5  02/01/2018          Additional Relevant Labs:  3/29/16 1706 cortisol 1.7  3/30/16 0439 cortisol 1.6  3/30/16 1402 ACTH  11, LH 34.5, FSH 98.6  3/31/16 0654 cortisol 0.6, IGF1 79  10/19/17: islet cell antibody < 1:4  8/28/18: TSH 2.21, free T4 0.77  9/3/18: insluin 45.6  9/6/18: insulin 9.3  9/7/18 C peptide 2.8  4/18/19 C peptide 1.8   6/10/19 1031 cortisol 12.7     Imaging review  6/10/19 CT Abdomen: both adrenals visualized and normal size

## 2019-06-13 NOTE — PROGRESS NOTES
GI Progress Note  Date of Service:  6/13/2019      Assessment:   53 year old female with a history of TPIAT (2012), MDD/anxiety and chronic abdominal pain who presented to the ED with 4 days of abdominal pain, melena and coffee ground emesis, GI consulted for dysphagia evaluation and concern for UGIB.    EGD 6/12 without source of bleeding, hgb has been stable and patient hasnt had melena or hematemesis while hospitalized. Initially thought to have a pyloric stenosis on EGD but this was ruled out in today's upper GI series. Unclear etiology for patient's dysphagia.        Recommendations:   - ok to advance diet as tolerated  - SPL evaluation with possible video swallow.       Patient was discussed with Dr. Perdomo.     Judy Polo   Internal Medicine Resident       __________________________________________________________________________________  Subjective:  Nursing notes reviewed and noted.  Patient denies abdominal pain, nausea, vomiting or melena. Has been tolerating liquid diet.       Physical Examination:  Vital Signs:  BP 91/50 (BP Location: Right arm)   Pulse 75   Temp 97.2  F (36.2  C) (Axillary)   Resp 18   Wt 62.8 kg (138 lb 8 oz)   SpO2 95%   BMI 25.33 kg/m     General:  NAD. Resting comfortably.  HEENT:  PERRL, EOMI, No scleral icterus of conjunctival injection. MMM.  Neck:  Supple.   Chest:  Clear bilaterally.    Cardiovascular: RRR. No m/r/g.   Abdomen:  Soft, NT, ND. BS+, No organomegally.   Extremities:  No peripheral edema.   Skin:  No rashes, abrasions or concerning lesions appreciated.   Neurological:  CN II-XII grossly normal.     DATA:  Labs:    Reviewed and noted

## 2019-06-13 NOTE — PLAN OF CARE
Admitted 6/10 central abdominal pain with a PMH of TPIAT (2012), appendectomy, cholecystectomy, adrenal insufficiency, migraine, and MDD/anxiety per MD note      Pain: Pt back/abdominal pain managed with PRN oxycodone and PRN IV Dilaudid   Nausea: denies  Lab: BS checks before meals, no coverage needed  BP 91/50 (BP Location: Right arm)   Pulse 75   Temp 97.2  F (36.2  C) (Axillary)   Resp 18   Wt 62.8 kg (138 lb 8 oz)   SpO2 95%   BMI 25.33 kg/m     Output: Adequately voiding   Diet: Full liquids   Activity: SBA   Bowel Function: Bowel sounds heard in all quadrants, passing flatus, no bm this shift   Lines: L PIV, infusing, dressing CDI   Plan: SBFT completed today, plan to be seen be speech this afternoon. Continue to monitor pain, nausea, VS, output, and bowel function

## 2019-06-14 VITALS
OXYGEN SATURATION: 95 % | TEMPERATURE: 97.3 F | BODY MASS INDEX: 25.33 KG/M2 | DIASTOLIC BLOOD PRESSURE: 54 MMHG | RESPIRATION RATE: 15 BRPM | SYSTOLIC BLOOD PRESSURE: 100 MMHG | HEART RATE: 58 BPM | WEIGHT: 138.5 LBS

## 2019-06-14 LAB
BACTERIA SPEC CULT: NORMAL
GLUCOSE BLDC GLUCOMTR-MCNC: 116 MG/DL (ref 70–99)
GLUCOSE BLDC GLUCOMTR-MCNC: 140 MG/DL (ref 70–99)
GLUCOSE BLDC GLUCOMTR-MCNC: 245 MG/DL (ref 70–99)
GLUCOSE BLDC GLUCOMTR-MCNC: 278 MG/DL (ref 70–99)
LACTATE BLD-SCNC: 0.7 MMOL/L (ref 0.7–2)
SPECIMEN SOURCE: NORMAL

## 2019-06-14 PROCEDURE — 99238 HOSP IP/OBS DSCHRG MGMT 30/<: CPT | Performed by: INTERNAL MEDICINE

## 2019-06-14 PROCEDURE — 83605 ASSAY OF LACTIC ACID: CPT | Performed by: INTERNAL MEDICINE

## 2019-06-14 PROCEDURE — 25000132 ZZH RX MED GY IP 250 OP 250 PS 637: Mod: GY | Performed by: HOSPITALIST

## 2019-06-14 PROCEDURE — 25000132 ZZH RX MED GY IP 250 OP 250 PS 637: Mod: GY | Performed by: STUDENT IN AN ORGANIZED HEALTH CARE EDUCATION/TRAINING PROGRAM

## 2019-06-14 PROCEDURE — 00000146 ZZHCL STATISTIC GLUCOSE BY METER IP

## 2019-06-14 PROCEDURE — 25800030 ZZH RX IP 258 OP 636

## 2019-06-14 PROCEDURE — 25000128 H RX IP 250 OP 636: Performed by: STUDENT IN AN ORGANIZED HEALTH CARE EDUCATION/TRAINING PROGRAM

## 2019-06-14 PROCEDURE — 36415 COLL VENOUS BLD VENIPUNCTURE: CPT | Performed by: INTERNAL MEDICINE

## 2019-06-14 PROCEDURE — 25000132 ZZH RX MED GY IP 250 OP 250 PS 637: Mod: GY

## 2019-06-14 RX ORDER — SUCRALFATE 1 G/1
1 TABLET ORAL
Qty: 30 TABLET | Refills: 0 | Status: SHIPPED | OUTPATIENT
Start: 2019-06-14 | End: 2019-06-14

## 2019-06-14 RX ORDER — HYDROCORTISONE 5 MG/1
5 TABLET ORAL AT BEDTIME
Qty: 30 TABLET | Refills: 0 | Status: SHIPPED | OUTPATIENT
Start: 2019-06-14 | End: 2020-11-08

## 2019-06-14 RX ORDER — SUCRALFATE 1 G/1
1 TABLET ORAL
Qty: 30 TABLET | Refills: 0 | Status: SHIPPED | OUTPATIENT
Start: 2019-06-14

## 2019-06-14 RX ORDER — HYDROCORTISONE 5 MG/1
5 TABLET ORAL AT BEDTIME
Qty: 30 TABLET | Refills: 0 | Status: SHIPPED | OUTPATIENT
Start: 2019-06-14 | End: 2019-06-14

## 2019-06-14 RX ADMIN — HYDROCORTISONE 20 MG: 10 TABLET ORAL at 08:48

## 2019-06-14 RX ADMIN — ACETAMINOPHEN 650 MG: 325 TABLET, FILM COATED ORAL at 06:50

## 2019-06-14 RX ADMIN — PREGABALIN 150 MG: 75 CAPSULE ORAL at 16:53

## 2019-06-14 RX ADMIN — HYDROMORPHONE HYDROCHLORIDE 0.5 MG: 1 INJECTION, SOLUTION INTRAMUSCULAR; INTRAVENOUS; SUBCUTANEOUS at 13:02

## 2019-06-14 RX ADMIN — PANCRELIPASE 72000 UNITS: 60000; 12000; 38000 CAPSULE, DELAYED RELEASE PELLETS ORAL at 15:42

## 2019-06-14 RX ADMIN — TOPIRAMATE 100 MG: 100 TABLET, FILM COATED ORAL at 08:47

## 2019-06-14 RX ADMIN — SUCRALFATE 1 G: 1 TABLET ORAL at 08:47

## 2019-06-14 RX ADMIN — METOCLOPRAMIDE HYDROCHLORIDE 10 MG: 10 TABLET ORAL at 08:48

## 2019-06-14 RX ADMIN — OXYCODONE HYDROCHLORIDE 5 MG: 5 TABLET ORAL at 06:50

## 2019-06-14 RX ADMIN — SODIUM CHLORIDE, POTASSIUM CHLORIDE, SODIUM LACTATE AND CALCIUM CHLORIDE 1000 ML: 600; 310; 30; 20 INJECTION, SOLUTION INTRAVENOUS at 09:27

## 2019-06-14 RX ADMIN — POTASSIUM CHLORIDE 20 MEQ: 750 TABLET, EXTENDED RELEASE ORAL at 08:48

## 2019-06-14 RX ADMIN — DICYCLOMINE HYDROCHLORIDE 10 MG: 10 CAPSULE ORAL at 06:14

## 2019-06-14 RX ADMIN — LEVOTHYROXINE SODIUM 112 MCG: 112 TABLET ORAL at 08:48

## 2019-06-14 RX ADMIN — DULOXETINE HYDROCHLORIDE 90 MG: 60 CAPSULE, DELAYED RELEASE ORAL at 08:47

## 2019-06-14 RX ADMIN — PANTOPRAZOLE SODIUM 40 MG: 40 TABLET, DELAYED RELEASE ORAL at 08:48

## 2019-06-14 RX ADMIN — OXYCODONE HYDROCHLORIDE 5 MG: 5 TABLET ORAL at 16:53

## 2019-06-14 RX ADMIN — OXYCODONE HYDROCHLORIDE 5 MG: 5 TABLET ORAL at 10:53

## 2019-06-14 RX ADMIN — DICYCLOMINE HYDROCHLORIDE 10 MG: 10 CAPSULE ORAL at 13:03

## 2019-06-14 RX ADMIN — PREGABALIN 150 MG: 75 CAPSULE ORAL at 08:51

## 2019-06-14 RX ADMIN — SUCRALFATE 1 G: 1 TABLET ORAL at 13:04

## 2019-06-14 RX ADMIN — DICYCLOMINE HYDROCHLORIDE 10 MG: 10 CAPSULE ORAL at 00:30

## 2019-06-14 ASSESSMENT — ACTIVITIES OF DAILY LIVING (ADL)
ADLS_ACUITY_SCORE: 12

## 2019-06-14 ASSESSMENT — PAIN DESCRIPTION - DESCRIPTORS
DESCRIPTORS: SORE
DESCRIPTORS: SHARP
DESCRIPTORS: SHARP

## 2019-06-14 NOTE — PLAN OF CARE
VSS.  Pain controlled on current regimen.  UAL.  Abdomen soft, non tender.  Hypotensive in AM, bolus given and is drinking fluids well.  Tolerating diet.  Discharge orders reviewed and patient verbalized understanding of discharge plan and patient is aware that she is not discharging on oxycodone.  Reviewed plan for insulin coverage until seen by Endocrine as outpatient.  Discharged to home.

## 2019-06-14 NOTE — PLAN OF CARE
AVSS on RA. Pt taking sched trazodone, prn tylenol, and prn oxycodone for abd and back pain management. Advanced to regular diet, int nausea. Gave prn zofran w/ some relief. Ate 75% of dinner.  BG checks, correction scale and carb coverage given. -gas, hypo BS. Voiding spont. Up SBA. PIV SL after fluid bolus. SBFT done today. Seen by SLP this afternoon. Cont to monitor BPs and pain management. Cont w/ POC.

## 2019-06-14 NOTE — DISCHARGE SUMMARY
[facility], [place]  Discharge Summary - Medicine & Pediatrics       Date of Admission:  6/10/2019  Date of Discharge:  6/14/2019  Discharging Provider: Dr. Paty Caicedo  Discharge Service: Sydni 1    Discharge Diagnoses   Dysphagia   History of Central Adrenal Insufficiency  Hypothyroidism   Diabetes Mellitus  S/P Total Pancreatectomy with Islet Autotransplantation in 2012. Partial islet graft function.     Follow-ups Needed After Discharge   Follow-up Appointments     Adult [facility] Follow-up and recommended labs and tests      Follow up with primary care provider, Ron Morgan, within 7   days to evaluate medication change and for hospital follow- up.  No follow   up labs or test are needed.      Follow up with Endocrinology as at outpatient within 7 days to review your   diabetes medications and pump.    Appointments on [place] and/or [facility] (with [facility] or [facility]   provider or service). Call [phone] if you haven't heard regarding   these appointments within 7 days of discharge.           Unresulted Labs Ordered in the Past 30 Days of this Admission     Date and Time Order Name Status Description    6/13/2019 0924 Urine Culture Aerobic Bacterial Preliminary     6/10/2019 1317 Blood culture Preliminary     6/10/2019 1317 Blood culture Preliminary       These results will be followed up by primary care    Discharge Disposition   Discharged to home  Condition at discharge: Fair    Hospital Course   Chantell Kidd was admitted on 6/10/2019 for abdominal pain.  The following problems were addressed during her hospitalization:    Odynophagia  Abdominal pain  Lactic acidosis  S/p TPIAT (2012), appendectomy, cholecystectomy  Etiologies of upper GI bleed could include PUD, gastropathy, esophagitis, vascular lesions (AVM, gave, etc.), or MWT. No recent EtOH or NSAIDs. No diarrhea today with only two stools. Given her extensive abdominal surgeries, adhesions is always a  "consideration.  - GI consult, recs appreciated               -EGD 6/12: w/o evidence for active bleeding or pathology to explain UGIB               -PPI PO BID 30min prior to meals for next 8wks, then once thereafter               -UGI w/ SBFT: \"pylorus sparing pancreaticoduodenectomy with duodedenal-duodenal anastomosis, no definite stricture identified.\"     - No GI follow up needed per GI. Dysphagia improved by day of discharge     T1DM, hypoglycemia  A1c 8.2% S/p TPIAT with loss of most of endocrine function, on home insulin pump. A1C 4/18/19 was 9.6. Initial glucose 219 on admission then later was 34 without any insulin given but likely her home insulin pump was still running. Spoke with endocrine and they will see her tomorrow and for now will do 8U degludec, D5 drip at 50cc/hr and check glucose q4hr with low ISS. Not taking PO. Considering SGLT2 inhibitor  - Holding insulin pump on discharge, hold until endocrine evaluation in 1-2 weeks  - correction coverage: Novolog Insulin subcutaneous before meals and HS.                   - 279: give 1 unit                 - 379: give 2 unit                 - 479: give 3 unit  - carb coverage, 1U novolog/25 U carbs  - Discontinue insulin pump until patient is evaluated by Endocrinologist in clinic in 1-2 weeks    Adrenal insufficiency  Has been on lasix 20mg BID for \"fluid retention likely related to her steroid use\" since 2016 (normal echo that year); no edema now but holding furosemide in setting of hypotension. Increased steroids during stay for concern for stress. Resume home steroids upon discharge (10qAm, 5qPM per endocrine).     Consultations This Hospital Stay   GI LUMINAL ADULT IP CONSULT  SWALLOW EVAL SPEECH PATH AT BEDSIDE IP CONSULT  MEDICATION HISTORY IP PHARMACY CONSULT  ENDOCRINE NON-DIABETES ADULT IP CONSULT  DIABETES EDUCATION IP CONSULT  VASCULAR ACCESS CARE ADULT IP CONSULT    Code Status   Full Code     The patient was discussed with " Dr. Red Caicedo MD MPH  [facility] 1 Service  [facility], [place]  Pager: [phone]  ______________________________________________________________________    Physical Exam   Vital Signs: Temp: 97.3  F (36.3  C) Temp src: Oral BP: 100/54 Pulse: 58 Heart Rate: 82 Resp: 15 SpO2: 95 % O2 Device: None (Room air)    Weight: 138 lbs 8 oz    General Appearance: Alert, comfortable  Respiratory: CTAB  Cardiovascular: RRR no appreciable murmur  GI: Soft, NTND normoactive bowel sounds. Non tender in epigastric area, Tender on deeper pressure mid abd and below lower ribcage. No gaurding or rebound  Skin: Some scattered healed ecchymoses      Primary Care Physician   Ron Morgan    Discharge Orders      Endocrinology Adult Referral      Reason for your hospital stay    Dear Chantell Kidd    Your were hospitalized at [facility] with dark stools and was treated with IV fluids and pain medicatios.  Over your hospitalization your pain improved and today you are ready to be discharged back home.  If you continue advancing your diet slowly you should continue to improve but if you develop fever, shortness of breath, light headedness, chest pain, worsening abdominal pain, or worsening swallowing please seek medical attention.    We are suggesting the following medication changes:  -Please do not connect your insulin pump until you can be seen in endocrine clinic  -Carafate 1gm QID   -Cortef 10mg in am, 5mg in PM  -Correction coverage: Novolog Insulin subcutaneous before meals and HS.                   - 279: give 1 unit                 - 379: give 2 unit                 - 479: give 3 unit  - Carb Coverage for meals and snacks : Novolog 1 unit for every 25g carbohydrate  - Hold your furosemide until your PO intake improves and you are seen by your PCP and/or endocrinology    Please get the following tests done:  None    Please set up an  appointment with:  PCP to talk about your recent hospitalization, and resuming your furosemide once your PO intake improves. They should also review your diabetes medicines at this time.  Endocrinology within one week to review your medications as they may need further adjustments.    It was a pleasure meeting with you today. Thank you for allowing me and my team the privilege of caring for you today. You are the reason we are here, and I truly hope we provided you with the excellent service you deserve. Please let us know if there is anything else we can do for you so that we can be sure you are leaving completely satisfied with your care experience.    Your hospital unit at the time of discharge is 7C so if you have any questions please call the hospital at [phone] and ask to talk to a nurse on 7C.    Take care!  Paty Caicedo MD, MPH  Internal Medicine Resident  [facility]     Adult [facility] Follow-up and recommended labs and tests    Follow up with primary care provider, Ron Morgan, within 7 days to evaluate medication change and for hospital follow- up.  No follow up labs or test are needed.      Follow up with Endocrinology as at outpatient within 7 days to review your diabetes medications and pump.    Appointments on [place] and/or [facility] (with [facility] or [facility] provider or service). Call [phone] if you haven't heard regarding these appointments within 7 days of discharge.     Activity    Your activity upon discharge: activity as tolerated     Full Code     Diet    Follow this diet upon discharge: Orders Placed This Encounter      Snacks/Supplements Adult: Boost Breeze; Between Meals      Advance Diet as Tolerated: Regular Diet Adult       Significant Results and Procedures   None    Discharge Medications   Current Discharge Medication List      START taking these medications    Details   !! hydrocortisone (CORTEF) 5 MG tablet Take 1 tablet (5 mg) by mouth At  Bedtime  Qty: 30 tablet, Refills: 0    Associated Diagnoses: Adrenal insufficiency (H)      sucralfate (CARAFATE) 1 GM tablet Take 1 tablet (1 g) by mouth 4 times daily (before meals and nightly)  Qty: 30 tablet, Refills: 0    Associated Diagnoses: Nausea       !! - Potential duplicate medications found. Please discuss with provider.      CONTINUE these medications which have CHANGED    Details   insulin aspart (NOVOPEN ECHO) 100 UNIT/ML cartridge Correction coverage: Novolog Insulin subcutaneous before meals and HS.                   - 279: give 1 unit                 - 379: give 2 unit                 - 479: give 3 unit  - Carb Coverage for meals and snacks : Novolog 1 unit for every 25g carbohydrate      Up to 10 units a day  Qty: 3 mL, Refills: 3    Associated Diagnoses: Post-pancreatectomy diabetes (H)         CONTINUE these medications which have NOT CHANGED    Details   acetaminophen 500 MG CAPS Take 1,000 mg by mouth three times a day as needed.      alendronate (FOSAMAX) 70 MG tablet Take 1 tablet (70 mg) by mouth every 7 days On Sundays take first thing in the morning with plain water and remain upright for at least 30 minutes and until after first food of the day    Do not restart Fosamax (alendronate) until your difficulty swallowing has resolved and you have finished the entire course of fluconazole (Diflucan).  Qty: 4 tablet, Refills: 0    Associated Diagnoses: Osteoporosis      alum & mag hydroxide-simethicone (MYLANTA/MAALOX) 200-200-25 MG CHEW chewable tablet Take 1 tablet by mouth 3 times daily as needed for indigestion      amylase-lipase-protease (CREON 12) 72159 units CPEP Take 6 to 8 capsules by mouth with meals and take 4 capsules with snacks. Needs up to 25 capsules per day  Qty: 750 capsule, Refills: 5    Associated Diagnoses: Exocrine pancreatic insufficiency      aspirin 81 MG tablet Take 81 mg by mouth daily.      cyclobenzaprine (FLEXERIL) 5 MG tablet Take 1 tablet (5  mg) by mouth 3 times daily as needed for muscle spasms  Qty: 42 tablet, Refills: 0    Associated Diagnoses: Muscle spasm      diclofenac (FLECTOR) 1.3 % Patch Place 1 patch onto the skin 2 times daily  Qty: 30 each, Refills: 1    Associated Diagnoses: Generalized abdominal pain      diclofenac (VOLTAREN) 1 % GEL 2 g Apply 2 g to skin four times a day as needed (to affected upper extremity joint(s)). Maximum 8g/day per joint, 16g/day total.      dicyclomine (BENTYL) 10 MG capsule Take 10 mg by mouth every 6 hours      dronabinol (MARINOL) 2.5 MG capsule Take 2 capsules (5 mg) by mouth 2 times daily as needed  Qty: 56 capsule, Refills: 5    Associated Diagnoses: Routine health maintenance      !! DULoxetine (CYMBALTA) 30 MG capsule Take 1 capsule (30 mg) by mouth daily With 60mg capsule for total dose of 90mg  Qty: 90 capsule, Refills: 0    Associated Diagnoses: Major depressive disorder, recurrent episode, moderate (H); CIERRA (generalized anxiety disorder)      !! DULoxetine (CYMBALTA) 60 MG EC capsule Take 1 capsule (60 mg) by mouth daily With 30mg capsule for total dose of 90mg  Qty: 90 capsule, Refills: 1    Associated Diagnoses: Major depressive disorder, recurrent episode, moderate (H); CIERRA (generalized anxiety disorder)      !! hydrocortisone (CORTEF) 10 MG tablet Take 10 mg in the morning and 10 mg at bedtime. Watch for hypoglycemia recurrence.  Qty: 60 tablet, Refills: 1    Associated Diagnoses: Hypoglycemia; Adrenal insufficiency (H)      levothyroxine (SYNTHROID/LEVOTHROID) 112 MCG tablet Take 1 tablet (112 mcg) by mouth daily  Qty: 90 tablet, Refills: 3    Associated Diagnoses: Hypothyroidism      linaclotide (LINZESS) 145 MCG capsule Take 1 capsule (145 mcg) by mouth every morning (before breakfast)  Qty: 90 capsule, Refills: 3    Associated Diagnoses: Constipation, unspecified constipation type      metoclopramide (REGLAN) 5 MG tablet Take 2 tablets (10 mg) by mouth 3 times daily  Qty: 180 tablet, Refills:  3    Associated Diagnoses: Nausea      ondansetron (ZOFRAN-ODT) 4 MG ODT tab DISSOLVE ONE TABLET ON THE TONGUE EVERY 6 HOURS AS NEEDED FOR NAUSEA AND VOMITING  Qty: 60 tablet, Refills: 2    Associated Diagnoses: Nausea      pantoprazole (PROTONIX) 40 MG EC tablet Take 1 tablet (40 mg) by mouth 2 times daily  Qty: 180 tablet, Refills: 3    Associated Diagnoses: H. pylori infection      polyethylene glycol (MIRALAX/GLYCOLAX) packet Take 1 packet by mouth 2 times daily as needed for constipation   Qty: 14 each, Refills: 5    Associated Diagnoses: Chronic constipation      potassium chloride SA (K-DUR/KLOR-CON M) 20 MEQ CR tablet Take 1 tablet (20 mEq) by mouth daily  Qty: 90 tablet, Refills: 1    Associated Diagnoses: Hypokalemia      pregabalin (LYRICA) 150 MG capsule Take 1 capsule (150 mg) by mouth 3 times daily  Qty: 90 capsule, Refills: 5    Associated Diagnoses: Chronic generalized abdominal pain      senna-docusate (SENOKOT-S/PERICOLACE) 8.6-50 MG tablet Take 1-2 tablets by mouth daily as needed for constipation  Qty: 60 tablet, Refills: 3    Associated Diagnoses: Constipation      sodium chloride (OCEAN) 0.65 % nasal spray Spray 1 spray into both nostrils daily as needed for congestion  Qty: 30 mL, Refills: 0    Associated Diagnoses: Irritation of nose      SUMAtriptan (IMITREX) 50 MG tablet Take 1 tablet (50 mg) by mouth at onset of headache for migraine Take 1 Tab by mouth Once as needed for Migraine Headache. May repeat after two hours.  Maximum dose 200 mg/24 hours.  Qty: 30 tablet, Refills: 1    Associated Diagnoses: Migraine      topiramate (TOPAMAX) 100 MG tablet Take 1 tablet (100 mg) by mouth 2 times daily  Qty: 180 tablet, Refills: 1    Associated Diagnoses: Migraine, unspecified, not intractable, without status migrainosus      traZODone (DESYREL) 100 MG tablet TAKE 1-2 TABLETS BY MOUTH AN HOUR BEFORE BEDTIME  Qty: 100 tablet, Refills: 1    Associated Diagnoses: Primary insomnia; Constipation,  unspecified constipation type; Chronic back pain, unspecified back location, unspecified back pain laterality; Physical deconditioning      Injection Device for insulin (NOVOPEN ECHO) RICARDO Use with   Insulin  Qty: 1 each, Refills: 0    Associated Diagnoses: Post-pancreatectomy diabetes (H)      Nutritional Supplements (BOOST HIGH PROTEIN) LIQD After above baseline labs are drawn, give: 6 mL/kg to maximum of 360 mL; the beverage is to be consumed within 5 minutes.  Refills: 0    Comments: If on pancreatic enzymes, patient may take home enzymes with Boost beverage.      Patients may take long acting insulin (Levemir and Lantus).      Patient should NOT cover Boost with Novolog, Humalog, Apidra, or regular insulin.  Associated Diagnoses: Post-pancreatectomy diabetes (H); Pancreatic insufficiency      order for DME by [employer] [place], [place]  Ph: [phone]  Fax: [phone]    Nutren 1.5 @ 10 ml/hr with advancement by 10 ml/hr q 8 hours to goal rate of 40 ml/hr.  This will provide 1440 kcals (27 kcal/kg/day), 65 g PRO (1.2 g/kg/day), 730 mL H2O, 169 g CHO and no Fiber daily.     Qty: 1 Month, Refills: 3    Associated Diagnoses: Hypoglycemia; Nausea; Decreased oral intake; Exocrine pancreatic insufficiency; Type 1 diabetes mellitus with hypoglycemia and without coma (H); Generalized abdominal pain; Post-pancreatectomy diabetes (H); Cell transplant; On enteral nutrition       !! - Potential duplicate medications found. Please discuss with provider.      STOP taking these medications       furosemide (LASIX) 20 MG tablet Comments:   Reason for Stopping:         insulin degludec (TRESIBA FLEXTOUCH) 100 UNIT/ML pen Comments:   Reason for Stopping:             Allergies   Allergies   Allergen Reactions     Corticosteroids Other (See Comments)     All oral,IV and injectable steroids are contraindicated (unless in life threatening situations) in Islet Auto transplant recipients. They can  cause irreversible loss of islet cell function. Please contact patients transplant care coordinator Malini Julien RN BSN @[phone]/Pager [phone]  and Endocrinologist prior to administration.     Chocolate Flavor Rash     Breaks out when eats chocolate     Pt was seen and examined by me on the day of discharge.  I agree with the detailed follow up plans as documented above    Tonie Moon MD

## 2019-06-14 NOTE — PROGRESS NOTES
Endocrinology Consult - Progress Note  Date of Service: June 14, 2019  Patient: Chantell Kidd (MRN [id number])      Discharge Recommendations:  - Discontinue insulin pump until patient is evaluated by Endocrinologist in clinic in 1-2 weeks.   - Correction coverage: Novolog Insulin subcutaneous before meals and HS.                   - 279: give 1 unit                 - 379: give 2 unit                 - 479: give 3 unit  - Carb Coverage for meals and snacks : Novolog 1 unit for every 25g carbohydrate  - Glucose checks: TID AC, HS, 2AM.    - Recommend diabetes education as it appears her last visit with a diabetes educator was in 2014  - Discontinue stress dose steroids. Resume prior to admission dose of Hydrocortisone 10mg PO in AM; 5mg PO in PM        Assessment  Diabetes Mellitus (A1C of 8.2%)   S/P Total Pancreatectomy with Islet Autotransplantation in 2012. Partial islet graft function.   Given the fact that she has received low amounts of insulin over the past 72 hours without going into DKA or persistently elevated blood sugars, she appears to be making some endogenous insulin. Previous lab tests further support some endogenous insulin production as her C-peptide was 1.8 in April 2019. At this time, the risk of hypoglycemia from exogenous insulin overdose is greater than persistent hyperglycemia/DKA. We recommend discontinuing the insulin pump until she is able to follow-up with her endocrinologist in 1-2 weeks. The correction scale and carb coverage in the hospital has provided good glycemic control.     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 Maher and was on Hydrocortisone 10mg/5mg prior to admission. Received stress dose steroids for 3 days this hospitalization. She does not have any indications for stress-dose steroids at this time. We recommend resuming her home dose of Hydrocortisone upon discharge this evening.      Hypothyroidism  At home,  patient is on Levothyroxine 112 mcg/day with reported missed doses prior to admission. Thyroid function labs were normal on 06/12, therefore, unlikely contributing to her current glycemic control.       The patient was discussed with Endocrinology attending, Dr. Rosy Hawthorne, MS4  [facility]      Interval History:  - No acute events overnight  - Her appetite has modestly improved, and she was able to eat eggs and toast for breakfast  - She continues to describe diffuse abdominal pain      Total daily insulin requirement :  6/13: 4 units  6/12: 2 units  6/11 2 units    Physical Examination  Vitals: BP 92/57 (BP Location: Right arm)   Pulse 58   Temp 95.7  F (35.4  C)   Resp 14   Wt 62.8 kg (138 lb 8 oz)   SpO2 96%   BMI 25.33 kg/m      General: siting up in bed, calm, conversant, no acute distress  HEENT: normocephalic, atraumatic, anicteric, MMM  Lungs: Clear to auscultation bilaterally; no crackles or wheezes. Non-labored breathing on room air.  CV: Regular rate and rhythm. No murmur.  Abdomen: Soft, non-distended. Diffuse abdominal tenderness to palpation without rebound or guarding.  Skin: Warm and dry, no obvious lesions  MSK: Normal muscle bulk  Lymph: No lower extremity edema  Psych: Calm, appropriate mood and affect. Fluent and coherent speech.     Labs:    Results for orders placed or performed during the hospital encounter of 06/10/19 (from the past 24 hour(s))   Glucose by meter   Result Value Ref Range    Glucose 152 (H) 70 - 99 mg/dL   Glucose by meter   Result Value Ref Range    Glucose 261 (H) 70 - 99 mg/dL   Glucose by meter   Result Value Ref Range    Glucose 140 (H) 70 - 99 mg/dL   Lactic acid level STAT for sepsis protocol   Result Value Ref Range    Lactate for Sepsis Protocol 0.7 0.7 - 2.0 mmol/L   Glucose by meter   Result Value Ref Range    Glucose 116 (H) 70 - 99 mg/dL   Glucose by meter   Result Value Ref Range    Glucose 245 (H) 70 - 99 mg/dL      *Note: Due to a large number of results and/or encounters for the requested time period, some results have not been displayed. A complete set of results can be found in Results Review.        Ref. Range 6/12/2019 08:01   Sodium Latest Ref Range: 133 - 144 mmol/L 140   Potassium Latest Ref Range: 3.4 - 5.3 mmol/L 4.2   Chloride Latest Ref Range: 94 - 109 mmol/L 105   Carbon Dioxide Latest Ref Range: 20 - 32 mmol/L 28   Urea Nitrogen Latest Ref Range: 7 - 30 mg/dL 8   Creatinine Latest Ref Range: 0.52 - 1.04 mg/dL 0.91   GFR Estimate Latest Ref Range: >60 mL/min/1.73_m2 71   GFR Estimate If Black Latest Ref Range: >60 mL/min/1.73_m2 82   Calcium Latest Ref Range: 8.5 - 10.1 mg/dL 9.1   Anion Gap Latest Ref Range: 3 - 14 mmol/L 6     Diabetes Control:         Lab Results   Component Value Date     A1C 8.2 06/11/2019     A1C Canceled, Test credited 06/10/2019     A1C 9.6 04/18/2019     A1C 6.6 08/27/2018     A1C 7.5 02/01/2018     Imaging review  6/10/19 CT Abdomen: both adrenals visualized and normal size     Thyroid Function Tests (06/12): TSH 3.53, free T4 1.04       Additional Relevant Labs:  3/29/16 1706 cortisol 1.7  3/30/16 0439 cortisol 1.6  3/30/16 1402 ACTH  11, LH 34.5, FSH 98.6  3/31/16 0654 cortisol 0.6, IGF1 79  10/19/17: islet cell antibody < 1:4  8/28/18: TSH 2.21, free T4 0.77  9/3/18: insluin 45.6  9/6/18: insulin 9.3  9/7/18 C peptide 2.8  4/18/19 C peptide 1.8   6/10/19 1031 cortisol 12.7

## 2019-06-14 NOTE — PROGRESS NOTES
Gastroenterology Inpatient Sign Off Note    Patient continues to improve, no further evidence of GI bleed and hemoglobin has remained stable. Unclear cause for her dysphagia, upper GI with SBFT ruled out pyloric stenosis, EGD otherwise umremarkable. SPL assessed her and think dysphagia might be esophageal in nature. Patient has been tolerating food and seems that dysphagia is getting better.     Current GI Consult Staff: Dr. Brown    Follow up recommendations:   No outpatient GI clinic follow-up indicated. Follow-up with primary care provider at timing determined by discharge physician.    Judy Polo  Internal Medicine Resident

## 2019-06-14 NOTE — PLAN OF CARE
Resting without complaints of pain overnight.  No medications needed.  UAL.  Voiding, forgot to save.  Enc to save today.  Denies nausea.  No issues swallowing pills tonight.  Will continue to monitor.

## 2019-06-17 ENCOUNTER — PATIENT OUTREACH (OUTPATIENT)
Dept: CARE COORDINATION | Facility: CLINIC | Age: 54
End: 2019-06-17

## 2019-06-17 ENCOUNTER — TELEPHONE (OUTPATIENT)
Dept: ENDOCRINOLOGY | Facility: CLINIC | Age: 54
End: 2019-06-17

## 2019-06-17 NOTE — TELEPHONE ENCOUNTER
[facility] Call Center    Phone Message    May a detailed message be left on voicemail: yes    Reason for Call: Other: Pt discharged from [facility] follow up with Endocrine for Diabetes. Please contact patient for appointment     Action Taken: Message routed to:  [facility] ([facility] Endocrine

## 2019-06-17 NOTE — TELEPHONE ENCOUNTER
Spoke with pt and pt stated she will follow up with her primary doctor, Dr. Maher who she has seen in the past for Diabetes

## 2019-06-18 ENCOUNTER — TELEPHONE (OUTPATIENT)
Dept: TRANSPLANT | Facility: CLINIC | Age: 54
End: 2019-06-18

## 2019-06-18 NOTE — TELEPHONE ENCOUNTER
Called Chantell to follow up post hospital admission. She has reattached her insulin pump (not recommended at discharge). She states she is using the previous settings Dr Maher had given her. -400( after a sandwich and cranberry juice ) She is working with [employer] to reset her password and access code so Dr Maher can review her sensor readings. She will forward these as soon as she gets them.

## 2019-06-26 ENCOUNTER — OFFICE VISIT (OUTPATIENT)
Dept: INTERNAL MEDICINE | Facility: CLINIC | Age: 54
End: 2019-06-26
Payer: MEDICARE

## 2019-06-26 VITALS
WEIGHT: 140 LBS | BODY MASS INDEX: 25.61 KG/M2 | HEART RATE: 81 BPM | DIASTOLIC BLOOD PRESSURE: 66 MMHG | SYSTOLIC BLOOD PRESSURE: 103 MMHG | TEMPERATURE: 98.7 F | OXYGEN SATURATION: 96 % | RESPIRATION RATE: 16 BRPM

## 2019-06-26 DIAGNOSIS — K43.9 VENTRAL HERNIA WITHOUT OBSTRUCTION OR GANGRENE: Primary | ICD-10-CM

## 2019-06-26 ASSESSMENT — PAIN SCALES - GENERAL: PAINLEVEL: EXTREME PAIN (8)

## 2019-06-26 NOTE — NURSING NOTE
Chief Complaint   Patient presents with     Hospital F/U     Hernia     Also here for possible abdomen hernia       Kieran Leon CMA (AAMA) at 5:55 PM on 6/26/2019

## 2019-06-26 NOTE — PROGRESS NOTES
HPI  53-year-old returns today for reevaluation following hospital discharge.  She has been feeling better.  She has not had any further bleeding nausea or vomiting.  Her strength is slowly improving.  She said no associated dizziness or lightheadedness however she does have a sensation of a painful lump in the right midabdomen.  This is reducible but tender.  She feels as if she has a ventral hernia in this area.  She said no associated chills sweats or other complaints.  She is scheduled return to work but given her recent hospitalization and bleeding there is concern about her ability to work full-time and to maintain concentration so we recommended a reduced work schedule.  The blood sugars have been running high and she is working with endocrinology regarding pump adjustments for this.  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    TPIAT     Spasm of sphincter of Oddi      Past Surgical History:   Procedure Laterality Date     ARTHROPLASTY CARPOMETACARPAL (THUMB JOINT)  5/2/2014    Procedure: ARTHROPLASTY CARPOMETACARPAL (THUMB JOINT);  Surgeon: Carina Panda MD;  [facility]     CHOLECYSTECTOMY  2004     COLONOSCOPY  7/18/2014    Procedure: COLONOSCOPY;  Surgeon: Aurora aShu MD;  Location:  GI     COLONOSCOPY N/A 8/1/2017    Procedure: COLONOSCOPY;  Colonoscopy and upper endoscopy;  Surgeon: Deirdre Harris MD;  Location:  GI     ENDOSCOPIC RETROGRADE CHOLANGIOPANCREATOGRAM       ENDOSCOPIC RETROGRADE CHOLANGIOPANCREATOGRAM  4/19/2011    Procedure:ENDOSCOPIC RETROGRADE  CHOLANGIOPANCREATOGRAM; Pancreatic Stent Placement       ENDOSCOPIC RETROGRADE CHOLANGIOPANCREATOGRAM  5/26/2011    Procedure:ENDOSCOPIC RETROGRADE CHOLANGIOPANCREATOGRAM; with Pancreatic Stent Removal; Surgeon:DALE MIMS; [facility]     ENDOSCOPY UPPER, COLONOSCOPY, COMBINED  4/25/2012    Procedure:COMBINED ENDOSCOPY UPPER, COLONOSCOPY; Enteroscopy with Bile Duct Stent Removal, Colonoscopy  *Latex Safe Room*; Surgeon:GRACY GODWINIQ; [facility]     ESOPHAGOSCOPY, GASTROSCOPY, DUODENOSCOPY (EGD), COMBINED  5/26/2011    Procedure:COMBINED ESOPHAGOSCOPY, GASTROSCOPY, DUODENOSCOPY (EGD); Surgeon:DALE MIMS; [facility]     ESOPHAGOSCOPY, GASTROSCOPY, DUODENOSCOPY (EGD), COMBINED N/A 10/30/2014    Procedure: COMBINED ESOPHAGOSCOPY, GASTROSCOPY, DUODENOSCOPY (EGD), BIOPSY SINGLE OR MULTIPLE;  Surgeon: Sarai Moon MD;  [facility]     ESOPHAGOSCOPY, GASTROSCOPY, DUODENOSCOPY (EGD), COMBINED Left 7/6/2015    Procedure: COMBINED ESOPHAGOSCOPY, GASTROSCOPY, DUODENOSCOPY (EGD), BIOPSY SINGLE OR MULTIPLE;  Surgeon: Thomas Estrada MD;  [facility]     ESOPHAGOSCOPY, GASTROSCOPY, DUODENOSCOPY (EGD), COMBINED N/A 7/8/2016    Procedure: COMBINED ESOPHAGOSCOPY, GASTROSCOPY, DUODENOSCOPY (EGD), BIOPSY SINGLE OR MULTIPLE;  Surgeon: Eloy Klein MD;  [facility]     ESOPHAGOSCOPY, GASTROSCOPY, DUODENOSCOPY (EGD), COMBINED N/A 8/4/2016    Procedure: COMBINED ESOPHAGOSCOPY, GASTROSCOPY, DUODENOSCOPY (EGD), BIOPSY SINGLE OR MULTIPLE;  Surgeon: Jason Brown MD;  [facility]     ESOPHAGOSCOPY, GASTROSCOPY, DUODENOSCOPY (EGD), COMBINED N/A 8/1/2017    Procedure: COMBINED ESOPHAGOSCOPY, GASTROSCOPY, DUODENOSCOPY (EGD);;  Surgeon: Deirdre Harris MD;  [facility]     ESOPHAGOSCOPY, GASTROSCOPY, DUODENOSCOPY (EGD), COMBINED N/A 6/12/2019    Procedure: ESOPHAGOGASTRODUODENOSCOPY (EGD);  Surgeon: Jose Francisco Perdomo MD;   [facility]     GYN SURGERY      Hysterectomy and USO     [facility] ENDOSCOPY W EUS  7/20/2011    Procedure:COMBINED ENDOSCOPIC ULTRASOUND, ESOPHAGOSCOPY, GASTROSCOPY, DUODENOSCOPY (EGD); Surgeon:DARVIN DONOHUE; [facility]     HERNIORRHAPHY VENTRAL N/A 9/15/2016    Procedure: HERNIORRHAPHY VENTRAL;  Surgeon: Juanita Bernabe MD;  [facility]     HYSTERECTOMY  1997 or 1998    USO     INCISION AND DRAINAGE ABDOMEN WASHOUT, COMBINED  8/16/2012    Procedure: COMBINED INCISION AND DRAINAGE ABDOMEN WASHOUT;  ,debridement and Drainage Post Appendectomy;  Surgeon: Ron Austin MD;  [facility]     INJECT TRANSVERSUS ABDOMINIS PLANE (TAP) BLOCK BILATERAL Bilateral 5/26/2016    Procedure: INJECT TRANSVERSUS ABDOMINIS PLANE (TAP) BLOCK BILATERAL;  Surgeon: Leonard Mccallum MD;  [facility]     LAPAROSCOPIC APPENDECTOMY  7/30/2012    Procedure: LAPAROSCOPIC APPENDECTOMY;  Open Appendectomy;  Surgeon: Ron Austin MD;  [facility]     PANCREATECTOMY, TRANSPLANT AUTO ISLET CELL, COMBINED  1/6/2012    Procedure:COMBINED PANCREATECTOMY, TRANSPLANT AUTO ISLET CELL; Total  Pancreatectomy, Auto Islet Transplant, splenectomy, 18fr. transgastric-jejunal feeding tube placement, liver biopsy; Surgeon:PALAK LEE; [facility]     REPLACE GASTROSTOMY TUBE, PERCUTANEOUS N/A 8/30/2017    Procedure: REPLACE GASTROSTOMY TUBE, PERCUTANEOUS;  GJ Tube Change;  Surgeon: Jose Nath PA-C;  [facility]     SPLENECTOMY       Family History   Problem Relation Age of Onset     Hypertension Mother      Diabetes Mother      Osteoporosis Mother      Cancer Father         pancreatic cancer     Diabetes Maternal Grandmother      Cardiovascular Maternal Grandmother      Cancer Maternal Grandfather         lung cancer     Cancer Sister         brain     Cancer Sister         liver cancer     Social History     Socioeconomic History     Marital status:      Spouse name: None      Number of children: None     Years of education: None     Highest education level: None   Occupational History     None   Social Needs     Financial resource strain: None     Food insecurity:     Worry: None     Inability: None     Transportation needs:     Medical: None     Non-medical: None   Tobacco Use     Smoking status: Never Smoker     Smokeless tobacco: Never Used   Substance and Sexual Activity     Alcohol use: No     Alcohol/week: 0.0 oz     Drug use: No     Sexual activity: Yes   Lifestyle     Physical activity:     Days per week: None     Minutes per session: None     Stress: None   Relationships     Social connections:     Talks on phone: None     Gets together: None     Attends Faith service: None     Active member of club or organization: None     Attends meetings of clubs or organizations: None     Relationship status: None     Intimate partner violence:     Fear of current or ex partner: None     Emotionally abused: None     Physically abused: None     Forced sexual activity: None   Other Topics Concern     Parent/sibling w/ CABG, MI or angioplasty before 65F 55M? Not Asked   Social History Narrative     None       Exam:  /66 (BP Location: Right arm, Patient Position: Sitting, Cuff Size: Adult Regular)   Pulse 81   Temp 98.7  F (37.1  C) (Oral)   Resp 16   Wt 63.5 kg (140 lb)   SpO2 96%   Breastfeeding? No   BMI 25.61 kg/m    140 lbs 0 oz  PHYSICAL EXAMINATION:   The patient is alert, oriented with a clear sensorium.   Skin shows no lesions or rashes and good turgor.   Head is normocephalic and atraumatic.   Neck shows no nodes, thyromegaly or bruits.   Back is nontender.   Lungs are clear to percussion and auscultation.   Heart shows normal S1 and S2 without murmur or gallop.   Abdomen is soft, tender in right mid-abdomen without masses or organomegaly.   Extremities show no edema and no evidence of active synovitis.     ASSESSMENT  1 GI bleed appears resolved  2 abdominal pain  and reported lump possible hernia  3 diabetes mellitus inadequately controlled  4 status post pancreatectomy with islet cell transplant  5 chronic pain syndrome    Plan  Recommend until July 15 she worked to reduce schedule of 4 to 6 hours a day.  I am to have her follow-up with endocrinology regarding the diabetes her follow-up with me as needed.    Over 25 minutes spent with patient the majority in counseling and coordinating care.    This note was completed using Dragon voice recognition software.  Although reviewed after completion, some word and grammatical errors may occur.    Ron Morgan MD  General Internal Medicine  [facility]  [phone]

## 2019-07-01 ENCOUNTER — TELEPHONE (OUTPATIENT)
Dept: SURGERY | Facility: CLINIC | Age: 54
End: 2019-07-01

## 2019-07-01 NOTE — TELEPHONE ENCOUNTER
Pre Visit Call and Assessment    Date of call:  07/01/2019    Phone numbers:  Home number on file [phone] (home)    Reached patient/confirmed appointment:  Yes  Patient care team/Primary provider:  Ron Morgan  Referred to:  Dr. Keny Barker    Reason for visit:  Complex hernia history

## 2019-07-02 ENCOUNTER — OFFICE VISIT (OUTPATIENT)
Dept: SURGERY | Facility: CLINIC | Age: 54
End: 2019-07-02
Attending: INTERNAL MEDICINE
Payer: MEDICARE

## 2019-07-02 VITALS
OXYGEN SATURATION: 99 % | HEART RATE: 77 BPM | SYSTOLIC BLOOD PRESSURE: 105 MMHG | HEIGHT: 62 IN | TEMPERATURE: 98.6 F | WEIGHT: 139.6 LBS | BODY MASS INDEX: 25.69 KG/M2 | DIASTOLIC BLOOD PRESSURE: 64 MMHG

## 2019-07-02 DIAGNOSIS — R52 PAIN: Primary | ICD-10-CM

## 2019-07-02 ASSESSMENT — PAIN SCALES - GENERAL: PAINLEVEL: SEVERE PAIN (6)

## 2019-07-02 ASSESSMENT — MIFFLIN-ST. JEOR: SCORE: 1181.47

## 2019-07-02 ASSESSMENT — PATIENT HEALTH QUESTIONNAIRE - PHQ9: SUM OF ALL RESPONSES TO PHQ QUESTIONS 1-9: 13

## 2019-07-02 NOTE — LETTER
7/2/2019       RE: Chantell Kidd  [address]  [facility] [address]     Dear Colleague,    Thank you for referring your patient, Chantell Kidd, to the [facility] GENER[facility] SURGERY at [facility]. Please see a copy of my visit note below.    Chantell Kidd is a 53 year old female with a 1 month history of intermittent right abdominal mass associated with the following symptoms of pain.    Onset did not occur with lifting.  Obstructive symptoms:  no  Urinary difficulties:  no  Chronic cough: no  Constipation:  no  Current level of activity:  Low, works at business, active with work.    Past medical history, medications, allergies, family history, and social history were reviewed with the patient.    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    TPIAT     Spasm of sphincter of Oddi        Past Surgical History:   Procedure Laterality Date     ARTHROPLASTY CARPOMETACARPAL (THUMB JOINT)  5/2/2014    Procedure: ARTHROPLASTY CARPOMETACARPAL (THUMB JOINT);  Surgeon: Carina Panda MD;  [facility]: [facility]     CHOLECYSTECTOMY  2004     COLONOSCOPY  7/18/2014    Procedure: COLONOSCOPY;  Surgeon: Aurora Sahu MD;  Location:  GI     COLONOSCOPY N/A 8/1/2017    Procedure: COLONOSCOPY;  Colonoscopy and upper endoscopy;  Surgeon: Deirdre Harris MD;  Location:  GI     ENDOSCOPIC RETROGRADE CHOLANGIOPANCREATOGRAM       ENDOSCOPIC RETROGRADE CHOLANGIOPANCREATOGRAM  4/19/2011    Procedure:ENDOSCOPIC RETROGRADE CHOLANGIOPANCREATOGRAM; Pancreatic Stent Placement       ENDOSCOPIC  RETROGRADE CHOLANGIOPANCREATOGRAM  5/26/2011    Procedure:ENDOSCOPIC RETROGRADE CHOLANGIOPANCREATOGRAM; with Pancreatic Stent Removal; Surgeon:DALE MIMS; [facility]     ENDOSCOPY UPPER, COLONOSCOPY, COMBINED  4/25/2012    Procedure:COMBINED ENDOSCOPY UPPER, COLONOSCOPY; Enteroscopy with Bile Duct Stent Removal, Colonoscopy  *Latex Safe Room*; Surgeon:GRACY GODWIN; [facility]     ESOPHAGOSCOPY, GASTROSCOPY, DUODENOSCOPY (EGD), COMBINED  5/26/2011    Procedure:COMBINED ESOPHAGOSCOPY, GASTROSCOPY, DUODENOSCOPY (EGD); Surgeon:DALE MIMS; [facility]     ESOPHAGOSCOPY, GASTROSCOPY, DUODENOSCOPY (EGD), COMBINED N/A 10/30/2014    Procedure: COMBINED ESOPHAGOSCOPY, GASTROSCOPY, DUODENOSCOPY (EGD), BIOPSY SINGLE OR MULTIPLE;  Surgeon: Sarai Moon MD;  [facility]     ESOPHAGOSCOPY, GASTROSCOPY, DUODENOSCOPY (EGD), COMBINED Left 7/6/2015    Procedure: COMBINED ESOPHAGOSCOPY, GASTROSCOPY, DUODENOSCOPY (EGD), BIOPSY SINGLE OR MULTIPLE;  Surgeon: Thomas Estrada MD;  [facility]     ESOPHAGOSCOPY, GASTROSCOPY, DUODENOSCOPY (EGD), COMBINED N/A 7/8/2016    Procedure: COMBINED ESOPHAGOSCOPY, GASTROSCOPY, DUODENOSCOPY (EGD), BIOPSY SINGLE OR MULTIPLE;  Surgeon: Eloy Klein MD;  [facility]     ESOPHAGOSCOPY, GASTROSCOPY, DUODENOSCOPY (EGD), COMBINED N/A 8/4/2016    Procedure: COMBINED ESOPHAGOSCOPY, GASTROSCOPY, DUODENOSCOPY (EGD), BIOPSY SINGLE OR MULTIPLE;  Surgeon: Jason Brown MD;  [facility]     ESOPHAGOSCOPY, GASTROSCOPY, DUODENOSCOPY (EGD), COMBINED N/A 8/1/2017    Procedure: COMBINED ESOPHAGOSCOPY, GASTROSCOPY, DUODENOSCOPY (EGD);;  Surgeon: Deirdre Harris MD;  [facility]     ESOPHAGOSCOPY, GASTROSCOPY, DUODENOSCOPY (EGD), COMBINED N/A 6/12/2019    Procedure: ESOPHAGOGASTRODUODENOSCOPY (EGD);  Surgeon: Jose Francisco Perdomo MD;  Location:  GI     GYN SURGERY      Hysterectomy and [facility]  ENDOSCOPY W EUS  7/20/2011    Procedure:COMBINED ENDOSCOPIC ULTRASOUND, ESOPHAGOSCOPY, GASTROSCOPY, DUODENOSCOPY (EGD); Surgeon:DARVIN DONOHUE; [facility]     HERNIORRHAPHY VENTRAL N/A 9/15/2016    Procedure: HERNIORRHAPHY VENTRAL;  Surgeon: Juanita Bernabe MD;  [facility]     HYSTERECTOMY  1997 or 1998    USO     INCISION AND DRAINAGE ABDOMEN WASHOUT, COMBINED  8/16/2012    Procedure: COMBINED INCISION AND DRAINAGE ABDOMEN WASHOUT;  ,debridement and Drainage Post Appendectomy;  Surgeon: Ron Austin MD;  [facility]     INJECT TRANSVERSUS ABDOMINIS PLANE (TAP) BLOCK BILATERAL Bilateral 5/26/2016    Procedure: INJECT TRANSVERSUS ABDOMINIS PLANE (TAP) BLOCK BILATERAL;  Surgeon: Leonard Mccallum MD;  [facility]     LAPAROSCOPIC APPENDECTOMY  7/30/2012    Procedure: LAPAROSCOPIC APPENDECTOMY;  Open Appendectomy;  Surgeon: Ron Austin MD;  [facility]     PANCREATECTOMY, TRANSPLANT AUTO ISLET CELL, COMBINED  1/6/2012    Procedure:COMBINED PANCREATECTOMY, TRANSPLANT AUTO ISLET CELL; Total  Pancreatectomy, Auto Islet Transplant, splenectomy, 18fr. transgastric-jejunal feeding tube placement, liver biopsy; Surgeon:PALAK LEE; [facility]     REPLACE GASTROSTOMY TUBE, PERCUTANEOUS N/A 8/30/2017    Procedure: REPLACE GASTROSTOMY TUBE, PERCUTANEOUS;  GJ Tube Change;  Surgeon: Jose Nath PA-C;  [facility]     SPLENECTOMY         ROS: 10 point review of systems negative except noted in HPI  PHYSICAL EXAM  General appearance- healthy, alert, and in no distress.  Neck- Neck is supple without obvious adenopathy.  Lungs- Respiratory effort unlabored.  Gait- Normal.  Abdomen - soft non distended, non tender with well healed surgical scars. No mass or fascial defects noted. No hernia on today's exam.  CT done 6/2019 reviewed, no hernia seen there.  Impression: right abdominal pain appears to be musculoskeletal injury, no hernia on today's exam.    Today recommended local heat and follow expectantly. See no indications for surgical intervention. Will see me if does not improve over next several months.  The total time spent with this patient was 30 minutes.  Of this time, greater than 50% was spent counseling and coordinating care.     Again, thank you for allowing me to participate in the care of your patient.      Sincerely,    Keny Barker MD

## 2019-07-02 NOTE — PATIENT INSTRUCTIONS
You met with Dr. Keny Barker.      Today's visit instructions:    Return to the Surgery Clinic on an as needed basis.        If you have questions please contact Alfredito RN or Chantell RN during regular clinic hours, Monday through Friday 7:30 AM - 4:00 PM, or you can contact us via [facility] at anytime.       If you have urgent needs after-hours, weekends, or holidays please call the hospital at [phone] and ask to speak with our on-call General Surgery Team.    Appointment [phone], option #1   Nurse Advice (Alfredito or Chantell): [phone]   Surgery Scheduler (Tila): [phone]  Fax: [phone]

## 2019-07-02 NOTE — NURSING NOTE
Patient scored high on her depression screening. A message was sent to her PCP team regarding the score. Patient has a current diagnosis of chronic depression and is on medication. Dr. Barker was also made aware of the screening.

## 2019-07-02 NOTE — PROGRESS NOTES
Chantell Kidd is a 53 year old female with a 1 month history of intermittent right abdominal mass associated with the following symptoms of pain.    Onset did not occur with lifting.  Obstructive symptoms:  no  Urinary difficulties:  no  Chronic cough: no  Constipation:  no  Current level of activity:  Low, works at business, active with work.    Past medical history, medications, allergies, family history, and social history were reviewed with the patient.    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    TPIAT     Spasm of sphincter of Oddi        Past Surgical History:   Procedure Laterality Date     ARTHROPLASTY CARPOMETACARPAL (THUMB JOINT)  5/2/2014    Procedure: ARTHROPLASTY CARPOMETACARPAL (THUMB JOINT);  Surgeon: Carina Panda MD;  [facility]: [facility]     CHOLECYSTECTOMY  2004     COLONOSCOPY  7/18/2014    Procedure: COLONOSCOPY;  Surgeon: Aurora Sahu MD;  [facility]     COLONOSCOPY N/A 8/1/2017    Procedure: COLONOSCOPY;  Colonoscopy and upper endoscopy;  Surgeon: Deirdre Harris MD;  [facility]     ENDOSCOPIC RETROGRADE CHOLANGIOPANCREATOGRAM       ENDOSCOPIC RETROGRADE CHOLANGIOPANCREATOGRAM  4/19/2011    Procedure:ENDOSCOPIC RETROGRADE CHOLANGIOPANCREATOGRAM; Pancreatic Stent Placement       ENDOSCOPIC RETROGRADE CHOLANGIOPANCREATOGRAM  5/26/2011    Procedure:ENDOSCOPIC RETROGRADE CHOLANGIOPANCREATOGRAM; with Pancreatic Stent Removal; Surgeon:DALE MIMS; [facility]     ENDOSCOPY UPPER, COLONOSCOPY, COMBINED  4/25/2012    Procedure:COMBINED ENDOSCOPY UPPER,  COLONOSCOPY; Enteroscopy with Bile Duct Stent Removal, Colonoscopy  *Latex Safe Room*; Surgeon:GRACY GODWIN; [facility]     ESOPHAGOSCOPY, GASTROSCOPY, DUODENOSCOPY (EGD), COMBINED  5/26/2011    Procedure:COMBINED ESOPHAGOSCOPY, GASTROSCOPY, DUODENOSCOPY (EGD); Surgeon:DALE MIMS; [facility]     ESOPHAGOSCOPY, GASTROSCOPY, DUODENOSCOPY (EGD), COMBINED N/A 10/30/2014    Procedure: COMBINED ESOPHAGOSCOPY, GASTROSCOPY, DUODENOSCOPY (EGD), BIOPSY SINGLE OR MULTIPLE;  Surgeon: Sarai Moon MD;  [facility]     ESOPHAGOSCOPY, GASTROSCOPY, DUODENOSCOPY (EGD), COMBINED Left 7/6/2015    Procedure: COMBINED ESOPHAGOSCOPY, GASTROSCOPY, DUODENOSCOPY (EGD), BIOPSY SINGLE OR MULTIPLE;  Surgeon: Thomas Estrada MD;  [facility]     ESOPHAGOSCOPY, GASTROSCOPY, DUODENOSCOPY (EGD), COMBINED N/A 7/8/2016    Procedure: COMBINED ESOPHAGOSCOPY, GASTROSCOPY, DUODENOSCOPY (EGD), BIOPSY SINGLE OR MULTIPLE;  Surgeon: Eloy Klein MD;  Location:  GI     ESOPHAGOSCOPY, GASTROSCOPY, DUODENOSCOPY (EGD), COMBINED N/A 8/4/2016    Procedure: COMBINED ESOPHAGOSCOPY, GASTROSCOPY, DUODENOSCOPY (EGD), BIOPSY SINGLE OR MULTIPLE;  Surgeon: Jason Brown MD;  [facility]     ESOPHAGOSCOPY, GASTROSCOPY, DUODENOSCOPY (EGD), COMBINED N/A 8/1/2017    Procedure: COMBINED ESOPHAGOSCOPY, GASTROSCOPY, DUODENOSCOPY (EGD);;  Surgeon: Deirdre Hraris MD;  Location:  GI     ESOPHAGOSCOPY, GASTROSCOPY, DUODENOSCOPY (EGD), COMBINED N/A 6/12/2019    Procedure: ESOPHAGOGASTRODUODENOSCOPY (EGD);  Surgeon: Jose Francisco Perdomo MD;  Location:  GI     GYN SURGERY      Hysterectomy and USO     [facility] UGI ENDOSCOPY W EUS  7/20/2011    Procedure:COMBINED ENDOSCOPIC ULTRASOUND, ESOPHAGOSCOPY, GASTROSCOPY, DUODENOSCOPY (EGD); Surgeon:DARVIN DONOHUE; [facility]     HERNIORRHAPHY VENTRAL N/A 9/15/2016    Procedure: HERNIORRHAPHY VENTRAL;  Surgeon: Juanita Bernabe MD;   [facility]     HYSTERECTOMY  1997 or 1998    USO     INCISION AND DRAINAGE ABDOMEN WASHOUT, COMBINED  8/16/2012    Procedure: COMBINED INCISION AND DRAINAGE ABDOMEN WASHOUT;  ,debridement and Drainage Post Appendectomy;  Surgeon: Ron Austin MD;  [facility]     INJECT TRANSVERSUS ABDOMINIS PLANE (TAP) BLOCK BILATERAL Bilateral 5/26/2016    Procedure: INJECT TRANSVERSUS ABDOMINIS PLANE (TAP) BLOCK BILATERAL;  Surgeon: Leonard Mccallum MD;  [facility]     LAPAROSCOPIC APPENDECTOMY  7/30/2012    Procedure: LAPAROSCOPIC APPENDECTOMY;  Open Appendectomy;  Surgeon: Ron Austin MD;  [facility]     PANCREATECTOMY, TRANSPLANT AUTO ISLET CELL, COMBINED  1/6/2012    Procedure:COMBINED PANCREATECTOMY, TRANSPLANT AUTO ISLET CELL; Total  Pancreatectomy, Auto Islet Transplant, splenectomy, 18fr. transgastric-jejunal feeding tube placement, liver biopsy; Surgeon:PALAK LEE; [facility]     REPLACE GASTROSTOMY TUBE, PERCUTANEOUS N/A 8/30/2017    Procedure: REPLACE GASTROSTOMY TUBE, PERCUTANEOUS;  GJ Tube Change;  Surgeon: Jose Nath PA-C;  [facility]     SPLENECTOMY         ROS: 10 point review of systems negative except noted in HPI  PHYSICAL EXAM  General appearance- healthy, alert, and in no distress.  Neck- Neck is supple without obvious adenopathy.  Lungs- Respiratory effort unlabored.  Gait- Normal.  Abdomen - soft non distended, non tender with well healed surgical scars. No mass or fascial defects noted. No hernia on today's exam.  CT done 6/2019 reviewed, no hernia seen there.  Impression: right abdominal pain appears to be musculoskeletal injury, no hernia on today's exam.   Today recommended local heat and follow expectantly. See no indications for surgical intervention. Will see me if does not improve over next several months.  The total time spent with this patient was 30 minutes.  Of this time, greater than 50% was spent counseling and coordinating  care.

## 2019-07-02 NOTE — NURSING NOTE
"Chief Complaint   Patient presents with     Consult     New hernia consultation.       Vitals:    07/02/19 0716   BP: 105/64   BP Location: Left arm   Patient Position: Sitting   Cuff Size: Adult Regular   Pulse: 77   Temp: 98.6  F (37  C)   TempSrc: Oral   SpO2: 99%   Weight: 63.3 kg (139 lb 9.6 oz)   Height: 1.575 m (5' 2\")       Body mass index is 25.53 kg/m .         Notified the nurse about the PHQ9 score of 20      Danielle FRYE MA    "

## 2019-07-05 DIAGNOSIS — E13.9 POST-PANCREATECTOMY DIABETES (H): Primary | ICD-10-CM

## 2019-07-05 DIAGNOSIS — Z90.410 POST-PANCREATECTOMY DIABETES (H): Primary | ICD-10-CM

## 2019-07-05 DIAGNOSIS — E89.1 POST-PANCREATECTOMY DIABETES (H): Primary | ICD-10-CM

## 2019-07-11 ENCOUNTER — TELEPHONE (OUTPATIENT)
Dept: INTERNAL MEDICINE | Facility: CLINIC | Age: 54
End: 2019-07-11

## 2019-07-11 NOTE — PROGRESS NOTES
[facility]  Primary Care Center   Juancho Stewart MD  07/12/2019      Chief Complaint:   Evaluation of ability to return to work     History of Present Illness:   Chantell Kidd is a 53 year old female with a history of type I diabetes, anxiety, depression, migraines, and hypothyroidism who presents for evaluation of ability to return to work.    She would like an extension to continue working 4-6 hours a day from home for an additional two weeks because she still does not feel well. She would like a letter from me endorsing this. She got sick while visiting here and states that she still feels tired and her stomach still hurts, so she is not ready to travel back to [place], where she normally works. She has not had any bleeding since she saw Dr. Morgan on 6/26. Her stools seem a lot darker than they have ever been, and they are sometimes blackish/charcoal-colored, which has been going on for several weeks. She states that it is different from the dark stool with visible blood mixed in that she was having when she went to the hospital. She is not taking pepto bismol or iron supplements (aside from iron in her multivitamin, which she has been taking for a long time). She denies red blood in her stool. She last vomited two days ago, and her vomit was not bloody or like coffee grounds. She is not taking any blood thinners aside from baby aspirin, which was advised by her endocrinologist, Dr. Maher. She has not had much appetite for the last two and a half weeks. She has intermittent severe pain in her upper abdomen, which feels like a fist inside of her pushing out, and felt like her previous hernia. It typically lasts 15 minutes to a couple hours and occurs about every other day. This is new in the last month since she left the hospital. It is accompanied by right mid-back pain. Afterwards, her abdomen feels tender/sore.  It seems to be triggered by movement, like bending over, and eating, but is somewhat random. She  cannot think of any specific foods which trigger it. She treats with pain medication, ice, and rubbing the area. Her CT did not show a hernia. She had an endoscopy done in the hospital and they were unable to visualize the beginning of her small bowel. She has previously had kidney stones and she cannot remember if it feels like that. She sees Dr. Xie for GI care.      Review of Systems:   Pertinent items are noted in HPI or as in patient entered ROS below, remainder of complete ROS is negative.     Active Medications:       acetaminophen 500 MG CAPS, Take 1,000 mg by mouth three times a day as needed., Disp: , Rfl:      alendronate (FOSAMAX) 70 MG tablet, Take 1 tablet (70 mg) by mouth every 7 days On Sundays take first thing in the morning with plain water and remain upright for at least 30 minutes and until after first food of the day  Do not restart Fosamax (alendronate) until your difficulty swallowing has resolved and you have finished the entire course of fluconazole (Diflucan)., Disp: 4 tablet, Rfl: 0     alum & mag hydroxide-simethicone (MYLANTA/MAALOX) 200-200-25 MG CHEW chewable tablet, Take 1 tablet by mouth 3 times daily as needed for indigestion, Disp: , Rfl:      amylase-lipase-protease (CREON 12) 14891 units CPEP, Take 6 to 8 capsules by mouth with meals and take 4 capsules with snacks. Needs up to 25 capsules per day, Disp: 750 capsule, Rfl: 5     aspirin 81 MG tablet, Take 81 mg by mouth daily., Disp: , Rfl:      blood glucose (NO BRAND SPECIFIED) test strip, Use to test blood sugar 8 times daily or as directed., Disp: 200 strip, Rfl: 1     cyclobenzaprine (FLEXERIL) 5 MG tablet, Take 1 tablet (5 mg) by mouth 3 times daily as needed for muscle spasms, Disp: 42 tablet, Rfl: 0     diclofenac (FLECTOR) 1.3 % Patch, Place 1 patch onto the skin 2 times daily, Disp: 30 each, Rfl: 1     diclofenac (VOLTAREN) 1 % GEL, 2 g Apply 2 g to skin four times a day as needed (to affected upper extremity  joint(s)). Maximum 8g/day per joint, 16g/day total., Disp: , Rfl:      dicyclomine (BENTYL) 10 MG capsule, Take 10 mg by mouth every 6 hours, Disp: , Rfl:      dronabinol (MARINOL) 2.5 MG capsule, Take 2 capsules (5 mg) by mouth 2 times daily as needed, Disp: 56 capsule, Rfl: 5     DULoxetine (CYMBALTA) 30 MG capsule, Take 1 capsule (30 mg) by mouth daily With 60mg capsule for total dose of 90mg, Disp: 90 capsule, Rfl: 0     DULoxetine (CYMBALTA) 60 MG EC capsule, Take 1 capsule (60 mg) by mouth daily With 30mg capsule for total dose of 90mg, Disp: 90 capsule, Rfl: 1     hydrocortisone (CORTEF) 10 MG tablet, Take 10 mg in the morning and 10 mg at bedtime. Watch for hypoglycemia recurrence., Disp: 60 tablet, Rfl: 1     hydrocortisone (CORTEF) 5 MG tablet, Take 1 tablet (5 mg) by mouth At Bedtime, Disp: 30 tablet, Rfl: 0     Injection Device for insulin (NOVOPEN ECHO) RICARDO, Use with   Insulin, Disp: 1 each, Rfl: 0     insulin aspart (NOVOPEN ECHO) 100 UNIT/ML cartridge, Correction coverage: Novolog Insulin subcutaneous before meals and HS. See pharmacy instructions, Disp: 3 mL, Rfl: 3     levothyroxine (SYNTHROID/LEVOTHROID) 112 MCG tablet, Take 1 tablet (112 mcg) by mouth daily, Disp: 90 tablet, Rfl: 3     linaclotide (LINZESS) 145 MCG capsule, Take 1 capsule (145 mcg) by mouth every morning (before breakfast) (Patient taking differently: Take 1 capsule by mouth every morning before breakfast as needed.), Disp: 90 capsule, Rfl: 3     metoclopramide (REGLAN) 5 MG tablet, Take 2 tablets (10 mg) by mouth 3 times daily, Disp: 180 tablet, Rfl: 3     Nutritional Supplements (BOOST HIGH PROTEIN) LIQD, After above baseline labs are drawn, give: 6 mL/kg to maximum of 360 mL; the beverage is to be consumed within 5 minutes., Disp: , Rfl: 0     ondansetron (ZOFRAN-ODT) 4 MG ODT tab, DISSOLVE ONE TABLET ON THE TONGUE EVERY 6 HOURS AS NEEDED FOR NAUSEA AND VOMITING, Disp: 60 tablet, Rfl: 2     pantoprazole (PROTONIX) 40 MG EC  tablet, Take 1 tablet (40 mg) by mouth 2 times daily, Disp: 180 tablet, Rfl: 3     polyethylene glycol (MIRALAX/GLYCOLAX) packet, Take 1 packet by mouth 2 times daily as needed for constipation , Disp: 14 each, Rfl: 5     potassium chloride SA (K-DUR/KLOR-CON M) 20 MEQ CR tablet, Take 1 tablet (20 mEq) by mouth daily, Disp: 90 tablet, Rfl: 1     pregabalin (LYRICA) 150 MG capsule, Take 1 capsule (150 mg) by mouth 3 times daily, Disp: 90 capsule, Rfl: 5     senna-docusate (SENOKOT-S/PERICOLACE) 8.6-50 MG tablet, Take 1-2 tablets by mouth daily as needed for constipation, Disp: 60 tablet, Rfl: 3     sodium chloride (OCEAN) 0.65 % nasal spray, Spray 1 spray into both nostrils daily as needed for congestion, Disp: 30 mL, Rfl: 0     sucralfate (CARAFATE) 1 GM tablet, Take 1 tablet (1 g) by mouth 4 times daily (before meals and nightly), Disp: 30 tablet, Rfl: 0     SUMAtriptan (IMITREX) 50 MG tablet, Take 1 tablet (50 mg) by mouth at onset of headache for migraine Take 1 Tab by mouth Once as needed for Migraine Headache. May repeat after two hours.  Maximum dose 200 mg/24 hours., Disp: 30 tablet, Rfl: 1     topiramate (TOPAMAX) 100 MG tablet, Take 1 tablet (100 mg) by mouth 2 times daily, Disp: 180 tablet, Rfl: 1     traZODone (DESYREL) 100 MG tablet, TAKE 1-2 TABLETS BY MOUTH AN HOUR BEFORE BEDTIME, Disp: 100 tablet, Rfl: 1      Allergies:   Corticosteroids and Chocolate flavor      Past Medical History:  Chronic abdominal pain  Chronic pancreatitis s/p pancreatectomy  Depression  Generalized anxiety disorder  Gastroesophageal reflux disease (GERD)   Hypothyroidism  Kidney stones  Low serum cortisol level  Migraines   Peripheral neuropathy  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  Spasm of sphincter of Oddi  Islet auto transplant  Appendicitis  Migraine  Carpometacarpal degenerative joint disease  Exocrine pancreas insufficiency  Mood disorder due to a general medical  condition  Odynophagia  Iron deficiency  Chondromalacia of left patella     Past Surgical History:  Arthroplasty carpometacarpal (thumb joint) - 05/02/2014  Cholecystectomy - 2004  Endoscopic retrograde cholangiopancreatogram with pancreatic stent placement - 04/19/2011  Endoscopic retrograde cholangiopancreatogram with pancreatic removal - 05/26/2011  Hysterectomy and USO  Herniorrhaphy ventral - 09/15/2016  Incision and drainage, abdomen washout, post-appendectomy - 08/16/2012  Inject transversus abdominis plane (tap) block bilateral - 05/26/2016  Laparoscopic appendectomy - 0730/2012  Combined pancreatectomy, splenectomy, and transplant auto islet cell - 01/06/2012    Family History:   Hypertension - mother  Diabetes - mother  Osteoporosis - mother  Pancreatic cancer - father  Diabetes - maternal grandmother  Cardiovascular - maternal grandmother  Lung cancer - maternal grandfather  Brain cancer - sister  Liver cancer - sister      Social History:   The patient is , a nonsmoker, and does not consume alcohol.      Physical Exam:   /70 (BP Location: Right arm, Patient Position: Sitting, Cuff Size: Adult Regular)   Pulse 64   Temp 97.9  F (36.6  C) (Oral)   Resp 16   Breastfeeding? No    Constitutional: Alert. In no distress.  Head: Normocephalic.  Gastrointestinal: Abdomen soft. Non-tender. BS normal. No masses or organomegaly.  Psychiatric: Mentation appears normal. Normal affect.     Recent labs    Component      Latest Ref Rng & Units 7/12/2019   Cholesterol      <200 mg/dL 168   Triglycerides      <150 mg/dL 73   HDL Cholesterol      >49 mg/dL 83   LDL Cholesterol Calculated      <100 mg/dL 71   Non HDL Cholesterol      <130 mg/dL 86     Component      Latest Ref Rng & Units 7/12/2019   WBC      4.0 - 11.0 10e9/L 7.9   RBC Count      3.8 - 5.2 10e12/L 3.84   Hemoglobin      11.7 - 15.7 g/dL 11.4 (L)   Hematocrit      35.0 - 47.0 % 35.7   MCV      78 - 100 fl 93   MCH      26.5 - 33.0 pg 29.7    MCHC      31.5 - 36.5 g/dL 31.9   RDW      10.0 - 15.0 % 13.4   Platelet Count      150 - 450 10e9/L 372   Diff Method       Automated Method   % Neutrophils      % 63.6   % Lymphocytes      % 27.3   % Monocytes      % 6.7   % Eosinophils      % 1.1   % Basophils      % 0.8   % Immature Granulocytes      % 0.5   Nucleated RBCs      0 /100 0   Absolute Neutrophil      1.6 - 8.3 10e9/L 5.0   Absolute Lymphocytes      0.8 - 5.3 10e9/L 2.2   Absolute Monocytes      0.0 - 1.3 10e9/L 0.5   Absolute Eosinophils      0.0 - 0.7 10e9/L 0.1   Absolute Basophils      0.0 - 0.2 10e9/L 0.1   Abs Immature Granulocytes      0 - 0.4 10e9/L 0.0   Absolute Nucleated RBC       0.0     Component      Latest Ref Rng & Units 7/12/2019   Sodium      133 - 144 mmol/L 135   Potassium      3.4 - 5.3 mmol/L 3.8   Chloride      94 - 109 mmol/L 104   Carbon Dioxide      20 - 32 mmol/L 26   Anion Gap      3 - 14 mmol/L 5   Glucose      70 - 99 mg/dL 317 (H)   Urea Nitrogen      7 - 30 mg/dL 12   Creatinine      0.52 - 1.04 mg/dL 0.62   GFR Estimate      >60 mL/min/1.73:m2 >90   GFR Estimate If Black      >60 mL/min/1.73:m2 >90   Calcium      8.5 - 10.1 mg/dL 8.5   Bilirubin Total      0.2 - 1.3 mg/dL 0.2   Albumin      3.4 - 5.0 g/dL 3.8   Protein Total      6.8 - 8.8 g/dL 7.4   Alkaline Phosphatase      40 - 150 U/L 117   ALT      0 - 50 U/L 48   AST      0 - 45 U/L 64 (H)     Component      Latest Ref Rng & Units 7/12/2019   Color Urine       Straw   Appearance Urine       Clear   Glucose Urine      NEG:Negative mg/dL >499 (A)   Bilirubin Urine      NEG:Negative Negative   Ketones Urine      NEG:Negative mg/dL Negative   Specific Gravity Urine      1.003 - 1.035 1.006   Blood Urine      NEG:Negative Negative   pH Urine      5.0 - 7.0 pH 5.0   Protein Albumin Urine      NEG:Negative mg/dL Negative   Urobilinogen mg/dL      0.0 - 2.0 mg/dL 0.0   Nitrite Urine      NEG:Negative Negative   Leukocyte Esterase Urine      NEG:Negative Negative    Source       Midstream Urine   WBC Urine      0 - 5 /HPF <1   RBC Urine      0 - 2 /HPF <1   Squamous Epithelial /HPF Urine      0 - 1 /HPF <1       Assessment and Plan:  Melena  Patient reports intermittent black stools since even before her hospital stay. Prior to hospital stay, there was red blood, too, at times, but not since she was discharged. However, new since discharge is a mid-upper abdominal pain that radiates to right flank. This pain, she thinks, is triggered by certain movements and/or eating certain foods, happening roughly every other day, quite severe, and can last a few hours. She has not been able to pinpoint exactly what triggers might exist. Her labs will be checked. If hemoglobin dropping since hospital stay, we will need to work her up again for upper Gi bleed. If not, will still need to consider tests for new upper abdominal pain, which surgery did not think was a hernia. She continues on high dose PPI plus Carafate. I will have her stop baby aspirin while we figure this out.   - CBC with platelets differential  - Comprehensive metabolic panel    Flank pain  I doubt she has kidney trigger for pain, but as pain does radiate to the right flank, will check a UA.  - UA with Micro reflex to Culture     I did extend her current work restrictions through the end of the month, when she will see her PCP, as she still feels weak and is having this pain unpredictably.    Follow-up: PRN         Scribe Disclosure:  I, Eusebia Little, am serving as a scribe to document services personally performed by Juancho Stewart MD at this visit, based upon the provider's statements to me. All documentation has been reviewed by the aforementioned provider prior to being entered into the official medical record.     Portions of this medical record were completed by a scribe. UPON MY REVIEW AND AUTHENTICATION BY ELECTRONIC SIGNATURE, this confirms (a) I performed the applicable clinical services, and (b) the  record is accurate.   Juancho Stewart MD

## 2019-07-11 NOTE — TELEPHONE ENCOUNTER
[facility] Call Center    Phone Message    May a detailed message be left on voicemail: yes    Reason for Call: Form or Letter   Type or form/letter needing completion: Letter for work, stating Pt can only work half days for the next 2 weeks   Provider: NATALIIA MEAD  Date form needed: asap  Once completed: Patient will  at .Pt is wanting to get it emailed to [email or web address]      Action Taken: Message routed to:  [facility] ([facility]): [email or web address]

## 2019-07-12 ENCOUNTER — OFFICE VISIT (OUTPATIENT)
Dept: FAMILY MEDICINE | Facility: CLINIC | Age: 54
End: 2019-07-12
Payer: MEDICARE

## 2019-07-12 VITALS
SYSTOLIC BLOOD PRESSURE: 109 MMHG | TEMPERATURE: 97.9 F | DIASTOLIC BLOOD PRESSURE: 70 MMHG | HEART RATE: 64 BPM | RESPIRATION RATE: 16 BRPM

## 2019-07-12 DIAGNOSIS — E89.1 POST-PANCREATECTOMY DIABETES (H): ICD-10-CM

## 2019-07-12 DIAGNOSIS — K92.1 MELENA: Primary | ICD-10-CM

## 2019-07-12 DIAGNOSIS — Z90.410 POST-PANCREATECTOMY DIABETES (H): ICD-10-CM

## 2019-07-12 DIAGNOSIS — R10.9 FLANK PAIN: ICD-10-CM

## 2019-07-12 DIAGNOSIS — K92.1 MELENA: ICD-10-CM

## 2019-07-12 DIAGNOSIS — E13.9 POST-PANCREATECTOMY DIABETES (H): ICD-10-CM

## 2019-07-12 LAB
ALBUMIN SERPL-MCNC: 3.8 G/DL (ref 3.4–5)
ALBUMIN UR-MCNC: NEGATIVE MG/DL
ALP SERPL-CCNC: 117 U/L (ref 40–150)
ALT SERPL W P-5'-P-CCNC: 48 U/L (ref 0–50)
ANION GAP SERPL CALCULATED.3IONS-SCNC: 5 MMOL/L (ref 3–14)
APPEARANCE UR: CLEAR
AST SERPL W P-5'-P-CCNC: 64 U/L (ref 0–45)
BASOPHILS # BLD AUTO: 0.1 10E9/L (ref 0–0.2)
BASOPHILS NFR BLD AUTO: 0.8 %
BILIRUB SERPL-MCNC: 0.2 MG/DL (ref 0.2–1.3)
BILIRUB UR QL STRIP: NEGATIVE
BUN SERPL-MCNC: 12 MG/DL (ref 7–30)
CALCIUM SERPL-MCNC: 8.5 MG/DL (ref 8.5–10.1)
CHLORIDE SERPL-SCNC: 104 MMOL/L (ref 94–109)
CHOLEST SERPL-MCNC: 168 MG/DL
CO2 SERPL-SCNC: 26 MMOL/L (ref 20–32)
COLOR UR AUTO: ABNORMAL
CREAT SERPL-MCNC: 0.62 MG/DL (ref 0.52–1.04)
DIFFERENTIAL METHOD BLD: ABNORMAL
EOSINOPHIL # BLD AUTO: 0.1 10E9/L (ref 0–0.7)
EOSINOPHIL NFR BLD AUTO: 1.1 %
ERYTHROCYTE [DISTWIDTH] IN BLOOD BY AUTOMATED COUNT: 13.4 % (ref 10–15)
GFR SERPL CREATININE-BSD FRML MDRD: >90 ML/MIN/{1.73_M2}
GLUCOSE SERPL-MCNC: 317 MG/DL (ref 70–99)
GLUCOSE UR STRIP-MCNC: >499 MG/DL
HCT VFR BLD AUTO: 35.7 % (ref 35–47)
HDLC SERPL-MCNC: 83 MG/DL
HGB BLD-MCNC: 11.4 G/DL (ref 11.7–15.7)
HGB UR QL STRIP: NEGATIVE
IMM GRANULOCYTES # BLD: 0 10E9/L (ref 0–0.4)
IMM GRANULOCYTES NFR BLD: 0.5 %
KETONES UR STRIP-MCNC: NEGATIVE MG/DL
LDLC SERPL CALC-MCNC: 71 MG/DL
LEUKOCYTE ESTERASE UR QL STRIP: NEGATIVE
LYMPHOCYTES # BLD AUTO: 2.2 10E9/L (ref 0.8–5.3)
LYMPHOCYTES NFR BLD AUTO: 27.3 %
MCH RBC QN AUTO: 29.7 PG (ref 26.5–33)
MCHC RBC AUTO-ENTMCNC: 31.9 G/DL (ref 31.5–36.5)
MCV RBC AUTO: 93 FL (ref 78–100)
MONOCYTES # BLD AUTO: 0.5 10E9/L (ref 0–1.3)
MONOCYTES NFR BLD AUTO: 6.7 %
NEUTROPHILS # BLD AUTO: 5 10E9/L (ref 1.6–8.3)
NEUTROPHILS NFR BLD AUTO: 63.6 %
NITRATE UR QL: NEGATIVE
NONHDLC SERPL-MCNC: 86 MG/DL
NRBC # BLD AUTO: 0 10*3/UL
NRBC BLD AUTO-RTO: 0 /100
PH UR STRIP: 5 PH (ref 5–7)
PLATELET # BLD AUTO: 372 10E9/L (ref 150–450)
POTASSIUM SERPL-SCNC: 3.8 MMOL/L (ref 3.4–5.3)
PROT SERPL-MCNC: 7.4 G/DL (ref 6.8–8.8)
RBC # BLD AUTO: 3.84 10E12/L (ref 3.8–5.2)
RBC #/AREA URNS AUTO: <1 /HPF (ref 0–2)
SODIUM SERPL-SCNC: 135 MMOL/L (ref 133–144)
SOURCE: ABNORMAL
SP GR UR STRIP: 1.01 (ref 1–1.03)
SQUAMOUS #/AREA URNS AUTO: <1 /HPF (ref 0–1)
TRIGL SERPL-MCNC: 73 MG/DL
UROBILINOGEN UR STRIP-MCNC: 0 MG/DL (ref 0–2)
WBC # BLD AUTO: 7.9 10E9/L (ref 4–11)
WBC #/AREA URNS AUTO: <1 /HPF (ref 0–5)

## 2019-07-12 ASSESSMENT — PAIN SCALES - GENERAL: PAINLEVEL: SEVERE PAIN (6)

## 2019-07-12 NOTE — NURSING NOTE
Chief Complaint   Patient presents with     Eval/Assessment     Patient is here for re-assessment to extend going back to work and will need a new letter.       Kieran Leon CMA ([facility]) at 7:51 AM on 7/12/2019

## 2019-07-16 ENCOUNTER — DOCUMENTATION ONLY (OUTPATIENT)
Dept: CARE COORDINATION | Facility: CLINIC | Age: 54
End: 2019-07-16

## 2019-07-19 ENCOUNTER — PRE VISIT (OUTPATIENT)
Dept: INTERNAL MEDICINE | Facility: CLINIC | Age: 54
End: 2019-07-19

## 2019-07-19 NOTE — TELEPHONE ENCOUNTER
[facility]  Dept: [phone]     Patient: Cahntell Kidd   : 1965  MRN: [id number]  Encounter: 19       Pre Visit Assessment    Health Maintenance Due   Topic Date Due     ADVANCE CARE PLANNING  1965     MIGRAINE ACTION PLAN  1965     ZOSTER IMMUNIZATION (1 of 2) 2013     MAMMO SCREENING  2016     MICROALBUMIN  2017     DIABETIC FOOT EXAM  2017     EYE EXAM  04/10/2018     PAP  2019     Chart Reviewed for Labs needed/Health Maintenance: Yes   If labs needed, orders placed:  No,   Lab appointment scheduled:  No    Notes:Provider prefers to address need for tests at the time of appointment until further notice.     Rosy Salter at 1:16 PM on 2019

## 2019-08-01 DIAGNOSIS — E89.1 POST-PANCREATECTOMY DIABETES (H): Primary | ICD-10-CM

## 2019-08-01 DIAGNOSIS — Z90.410 POST-PANCREATECTOMY DIABETES (H): Primary | ICD-10-CM

## 2019-08-01 DIAGNOSIS — E13.9 POST-PANCREATECTOMY DIABETES (H): Primary | ICD-10-CM

## 2019-08-06 ENCOUNTER — TELEPHONE (OUTPATIENT)
Dept: TRANSPLANT | Facility: CLINIC | Age: 54
End: 2019-08-06

## 2019-08-06 DIAGNOSIS — E89.1 POST-PANCREATECTOMY DIABETES (H): Primary | ICD-10-CM

## 2019-08-06 DIAGNOSIS — Z90.410 POST-PANCREATECTOMY DIABETES (H): Primary | ICD-10-CM

## 2019-08-06 DIAGNOSIS — E13.9 POST-PANCREATECTOMY DIABETES (H): Primary | ICD-10-CM

## 2019-08-06 NOTE — TELEPHONE ENCOUNTER
3rd attempt today to get Chantell to confirm her appointment with Dr Maher today. No answer and so far no response to my voice mails asking for a call back.I called her  Yannick and he will have her call me when he speaks to her.

## 2019-08-07 ENCOUNTER — TELEPHONE (OUTPATIENT)
Dept: TRANSPLANT | Facility: CLINIC | Age: 54
End: 2019-08-07

## 2019-08-07 NOTE — TELEPHONE ENCOUNTER
Called Chantell who stated she did not know of the scheduled appointments with Dr Maher she has missed in the last week. She says her phone is acting up and that her  did not give her the message yesterday.I asked her if she has been sending her BS to Dr Maher and she said that she should be able to access them.   Will reschedule appointment  for later this year .

## 2019-11-08 ENCOUNTER — HEALTH MAINTENANCE LETTER (OUTPATIENT)
Age: 54
End: 2019-11-08

## 2019-11-18 ENCOUNTER — TELEPHONE (OUTPATIENT)
Dept: TRANSPLANT | Facility: CLINIC | Age: 54
End: 2019-11-18

## 2019-11-18 NOTE — TELEPHONE ENCOUNTER
Called Chantell who stated she did not know she had an appointment with Dr Maher this week. I have her date and time and she said she would be thereyt. She mentioned 300-400 but I was not sure if this was aberrant or normal. She has a CGM and stated that Dr Maher should be able to access her blood sugars via care link

## 2019-11-21 ENCOUNTER — APPOINTMENT (OUTPATIENT)
Dept: LAB | Facility: CLINIC | Age: 54
End: 2019-11-21
Payer: MEDICARE

## 2019-11-21 ENCOUNTER — OFFICE VISIT (OUTPATIENT)
Dept: TRANSPLANT | Facility: CLINIC | Age: 54
End: 2019-11-21
Attending: PEDIATRICS
Payer: MEDICARE

## 2019-11-21 ENCOUNTER — ALLIED HEALTH/NURSE VISIT (OUTPATIENT)
Dept: TRANSPLANT | Facility: CLINIC | Age: 54
End: 2019-11-21
Payer: MEDICARE

## 2019-11-21 VITALS
SYSTOLIC BLOOD PRESSURE: 120 MMHG | TEMPERATURE: 98.6 F | OXYGEN SATURATION: 95 % | HEART RATE: 91 BPM | WEIGHT: 148.2 LBS | DIASTOLIC BLOOD PRESSURE: 77 MMHG | BODY MASS INDEX: 27.11 KG/M2

## 2019-11-21 DIAGNOSIS — E13.9 POST-PANCREATECTOMY DIABETES (H): Primary | ICD-10-CM

## 2019-11-21 DIAGNOSIS — Z90.410 POST-PANCREATECTOMY DIABETES (H): ICD-10-CM

## 2019-11-21 DIAGNOSIS — E89.1 POST-PANCREATECTOMY DIABETES (H): Primary | ICD-10-CM

## 2019-11-21 DIAGNOSIS — R25.2 MUSCLE CRAMP: ICD-10-CM

## 2019-11-21 DIAGNOSIS — E13.9 POST-PANCREATECTOMY DIABETES (H): ICD-10-CM

## 2019-11-21 DIAGNOSIS — Z90.410 POST-PANCREATECTOMY DIABETES (H): Primary | ICD-10-CM

## 2019-11-21 DIAGNOSIS — E89.1 POST-PANCREATECTOMY DIABETES (H): ICD-10-CM

## 2019-11-21 DIAGNOSIS — K92.1 BLOOD IN STOOL: Primary | ICD-10-CM

## 2019-11-21 LAB
ANION GAP SERPL CALCULATED.3IONS-SCNC: 6 MMOL/L (ref 3–14)
BASOPHILS # BLD AUTO: 0 10E9/L (ref 0–0.2)
BASOPHILS NFR BLD AUTO: 0.6 %
BUN SERPL-MCNC: 8 MG/DL (ref 7–30)
CALCIUM SERPL-MCNC: 9.2 MG/DL (ref 8.5–10.1)
CHLORIDE SERPL-SCNC: 105 MMOL/L (ref 94–109)
CO2 SERPL-SCNC: 26 MMOL/L (ref 20–32)
CREAT SERPL-MCNC: 0.76 MG/DL (ref 0.52–1.04)
DIFFERENTIAL METHOD BLD: ABNORMAL
EOSINOPHIL # BLD AUTO: 0.1 10E9/L (ref 0–0.7)
EOSINOPHIL NFR BLD AUTO: 1 %
ERYTHROCYTE [DISTWIDTH] IN BLOOD BY AUTOMATED COUNT: 14.8 % (ref 10–15)
FERRITIN SERPL-MCNC: 17 NG/ML (ref 8–252)
GFR SERPL CREATININE-BSD FRML MDRD: 87 ML/MIN/{1.73_M2}
GLUCOSE SERPL-MCNC: 222 MG/DL (ref 70–99)
HBA1C MFR BLD: 6.7 % (ref 0–5.6)
HCT VFR BLD AUTO: 35.5 % (ref 35–47)
HGB BLD-MCNC: 11.6 G/DL (ref 11.7–15.7)
IMM GRANULOCYTES # BLD: 0 10E9/L (ref 0–0.4)
IMM GRANULOCYTES NFR BLD: 0.3 %
IRON SATN MFR SERPL: 14 % (ref 15–46)
IRON SERPL-MCNC: 58 UG/DL (ref 35–180)
LYMPHOCYTES # BLD AUTO: 1.9 10E9/L (ref 0.8–5.3)
LYMPHOCYTES NFR BLD AUTO: 30.9 %
MAGNESIUM SERPL-MCNC: 2.2 MG/DL (ref 1.6–2.3)
MCH RBC QN AUTO: 29.4 PG (ref 26.5–33)
MCHC RBC AUTO-ENTMCNC: 32.7 G/DL (ref 31.5–36.5)
MCV RBC AUTO: 90 FL (ref 78–100)
MONOCYTES # BLD AUTO: 0.4 10E9/L (ref 0–1.3)
MONOCYTES NFR BLD AUTO: 6.4 %
NEUTROPHILS # BLD AUTO: 3.8 10E9/L (ref 1.6–8.3)
NEUTROPHILS NFR BLD AUTO: 60.8 %
NRBC # BLD AUTO: 0 10*3/UL
NRBC BLD AUTO-RTO: 0 /100
PHOSPHATE SERPL-MCNC: 4.1 MG/DL (ref 2.5–4.5)
PLATELET # BLD AUTO: 398 10E9/L (ref 150–450)
POTASSIUM SERPL-SCNC: 4.3 MMOL/L (ref 3.4–5.3)
RBC # BLD AUTO: 3.94 10E12/L (ref 3.8–5.2)
SODIUM SERPL-SCNC: 137 MMOL/L (ref 133–144)
TIBC SERPL-MCNC: 410 UG/DL (ref 240–430)
WBC # BLD AUTO: 6.2 10E9/L (ref 4–11)

## 2019-11-21 PROCEDURE — 83540 ASSAY OF IRON: CPT | Performed by: PEDIATRICS

## 2019-11-21 PROCEDURE — 85025 COMPLETE CBC W/AUTO DIFF WBC: CPT | Performed by: PEDIATRICS

## 2019-11-21 PROCEDURE — 84100 ASSAY OF PHOSPHORUS: CPT | Performed by: PEDIATRICS

## 2019-11-21 PROCEDURE — 80048 BASIC METABOLIC PNL TOTAL CA: CPT | Performed by: PEDIATRICS

## 2019-11-21 PROCEDURE — 83550 IRON BINDING TEST: CPT | Performed by: PEDIATRICS

## 2019-11-21 PROCEDURE — 36415 COLL VENOUS BLD VENIPUNCTURE: CPT | Performed by: PEDIATRICS

## 2019-11-21 PROCEDURE — 82728 ASSAY OF FERRITIN: CPT | Performed by: PEDIATRICS

## 2019-11-21 PROCEDURE — 83735 ASSAY OF MAGNESIUM: CPT | Performed by: PEDIATRICS

## 2019-11-21 ASSESSMENT — PAIN SCALES - GENERAL: PAINLEVEL: MODERATE PAIN (4)

## 2019-11-21 NOTE — LETTER
11/21/2019       RE: Chantell Kidd  [address]  [facility] [address]     Dear Colleague,    Thank you for referring your patient, Chantell Kidd, to the [facility] SOLID ORGAN TRANSPLANT at [facility]. Please see a copy of my visit note below.    No chief complaint on file.      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     Post-pancreatectomy diabetes (H)     Type 1 diabetes mellitus with hypoglycemia and without coma (H)     Migraine     Abdominal muscle strain     CIERRA (generalized anxiety disorder)     Major depressive disorder, recurrent episode, moderate (H)     CMC DJD(carpometacarpal degenerative joint disease), localized primary     Exocrine pancreatic insufficiency     Hypothyroidism     Hypoglycemia     Mood disorder due to a general medical condition     Decreased oral intake     Odynophagia     Iron deficiency     Anemia, iron deficiency     Adrenal insufficiency (H)     S/P hernia repair     Nausea     Chondromalacia of left patella       HPI:  Chantell is a 53 year old female here for follow up of total pancreatectomy and islet autotransplant performed on January 6, 2012.  At the time of the procedure, the patient received 420,000 IEQ, or 5,438 IEQ/kg body weight.  She did not have diabetes before the procedure.  Early post-operative course was complicated by RLQ with appendicitis, eventually required appendectomy.    Despite the high islet  mass transplanted, Chantell's post-operative course was initially remarkable for high insulin needs.  She in fact does have islet function, as previously documented by mixed meal tests, but she was insulin resistant, requiring high doses of insulin when on MDI therapy.  She also had frequent day to day variability, and fluctuating patterns with illness and healthier times, as well as a complex regimen, all of which make her an excellent candidate for an insulin pump which she started in Feb 2014.  Extremely concerning was an episode of severe hypoglycemia in November 2013 at which time she was found unconscious.  Suspect at that time she was on too much basal insulin and because she was ill and not eating at the time, dropped far too low overnight.   In early 2014, she was started on an insulin pump with CGM.  Remarkably, her insulin needs have dropped dramatically on an insulin pump.  She was then relatively stable until 2016.  She was again admitted to the hospital on March 15 and March 29, 2016 for hypoglycemia, that appears to be secondary to both exogenous insulin and possible central adrenal insufficiency.   At that time she had the following lab findings:  On 3/29/16, elevated insulin with low C-peptide during BG 42 mg/dL around 5pm, and then again same pattern with BG 50s at 10pm.  Chantell is pretty clear that she did not take insuiln that day on 3/29/16. She also had three cortisol levels-- including one during hypoglycemia, one at around 4am (possibly also during hypoglycemia) and one 8am draw that were all low-- all 0.6- 1.6 range.    Of note, she did have a kenalog injection one week earlier on 3/24/16, just a few days prior to that draw and was also receiving narcotics in hospital (but not at home).  She has since had another severe hypogylcemic episode in Jan 2017 -- did not lose consciousness but was disoriented and unable to get help for herself at the store.  And again was admitted for severe hypoglycemia  at [facility] on 11/29/2017 (refer to 12/13/17 note) with labs consistent with exogenous insulin overdose although she denied taking any excess insulin (12/1 C-peptide <0.1 and insulin .64.7, 11/30 C-peptide 0.5 and insulin 91). Additional hypoglycemia admissions: 8/26/18, 9/3/18, with high insulin and low C-peptide c/w hypoglycemia secondary to exogenous insulin (9/3/18 at 6:41pm about 20 hours after last reported insulin dose; insulin 45.6 mU/L, C-peptide 0.2 ng/mL).    Picture is also complicated by non-diabetes history which includes the following :  - 7/6/2016 EGD identified diffuse candidiasis through entire esophagous.  Pt has also had recurrent oral thrush in 2016  - Recurrent chronic abdominal pain  - Recurrent vomiting of unclear etiology but G-T placed 10/2016 and converted to GJ 11/2016 with symptomatic improvement  Unclear if she has any gastroparesis.  Suboptimal study in March 2016 showed 100% retention at 1 hour and then rapid emptying.    - Hernia repair performed by general surgery 9/15/16 (TPIAT team not involved).    - Chronic abdominal pain      New history today:  1)  Diabetes:  Was admitted to hospital 6/10/19 for abdominal pain. Her glucose at that time was 219 mg/dL on admission but dropped to 34 mg/dL without any insulin given, reportedly. She was then taken off her insulin pump and placed on MDI, with consideration also for oral meds (SGLT-2). She was asked by hospital endo to remain off her pump until she saw me back in clinic. Unfortunately, she was scheduled to see me on 8/1/19 and 8/6/19 but did not make either visit. She did restart her pump on her own several days after hospital discharge. She has been largely using just basal insulin, but clearly she has some post prandial hyperglycemia.  She has had no real issues with hypoglycemia.  She has been working with a diabetes educator and plans to switch to auto mode in January (in conjunction with [place] CDE).    Settings  670G  Basal 0.5 unit/hour  Bolus:  Set at I:C ratio 25g,  mg/dL, target   Today daily insulin dose 12 units per day;  On average all basal 12 unit, 0 bolus. -- reviewing day to day, she has 4 days out of 14 with 1-2 bolus totaling <1 unit bolus    Mean CGM Glucose 161 mg/dL, SD 65 mg/dL, 66% time in range, 2% below 70,  22% at 180-250 and 10% above 250  She does have a gap without sensor, but sensor wear of 51% of the time in this download.       2)  Adrenal insufficiency:  Still unclear if this was transient or true central AI but we have been treating her regardless due to SHE history.  We have maintained her on 20 mg/day (10 BID) of cortef.  While long-term goal is to wean off of this and retest, we have not made changes due to episodes of hypoglycemia.    3)  Other reviewed today:   - stable abdominal pain issues- no opioids  - continues to focus on eating healthy  - had from Sunday to Tuesday blood in stool- maroon stool.  This has now resolved.  She did not want to go to ED and she had called primary clinic but no appointments available. No symptoms past two days.  She had EGD in the past that was normal.  - complains of bad muscle cramps, mostly calves, feet, some arms      Review of systems:  Review Of Systems  Skin: negative  Eyes: negative  Ears/Nose/Throat: negative  Respiratory: No shortness of breath, dyspnea on exertion, cough, or hemoptysis  Cardiovascular: negative  Gastrointestinal: chronic abdominal pain as aboeve  Genitourinary: negative  Musculoskeletal: muscle cramps  Neurologic: negative  Psychiatric: negative  Hematologic/Lymphatic/Immunologic: negative  Endocrine: as above    Past Medical and Surgical History:  Past Medical History:   Diagnosis Date     Chronic abdominal pain      Chronic pancreatitis (H)     S/P pancreatectomy     Depression with anxiety      Gastro-oesophageal reflux disease      Hypothyroidism 4/23/2015     Kidney stones      Low serum cortisol level (H)       Migraines      Other chronic pain     STOMACH     Other chronic pain     LUMBAR SPINE     Peripheral neuropathy      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    TPIAT     Spasm of sphincter of Oddi      Past Surgical History:   Procedure Laterality Date     ARTHROPLASTY CARPOMETACARPAL (THUMB JOINT)  5/2/2014    Procedure: ARTHROPLASTY CARPOMETACARPAL (THUMB JOINT);  Surgeon: Carina Panda MD;  Location: [facility]     CHOLECYSTECTOMY  2004     COLONOSCOPY  7/18/2014    Procedure: COLONOSCOPY;  Surgeon: Aurora Sahu MD;  [facility]: [facility]     COLONOSCOPY N/A 8/1/2017    Procedure: COLONOSCOPY;  Colonoscopy and upper endoscopy;  Surgeon: Deirdre Harris MD;  [facility]: [facility]     ENDOSCOPIC RETROGRADE CHOLANGIOPANCREATOGRAM       ENDOSCOPIC RETROGRADE CHOLANGIOPANCREATOGRAM  4/19/2011    Procedure:ENDOSCOPIC RETROGRADE CHOLANGIOPANCREATOGRAM; Pancreatic Stent Placement       ENDOSCOPIC RETROGRADE CHOLANGIOPANCREATOGRAM  5/26/2011    Procedure:ENDOSCOPIC RETROGRADE CHOLANGIOPANCREATOGRAM; with Pancreatic Stent Removal; Surgeon:DALE MIMS; [facility]     ENDOSCOPY UPPER, COLONOSCOPY, COMBINED  4/25/2012    Procedure:COMBINED ENDOSCOPY UPPER, COLONOSCOPY; Enteroscopy with Bile Duct Stent Removal, Colonoscopy  *Latex Safe Room*; Surgeon:GRACY GODWINIQ; Location[facility]     ESOPHAGOSCOPY, GASTROSCOPY, DUODENOSCOPY (EGD), COMBINED  5/26/2011    Procedure:COMBINED ESOPHAGOSCOPY, GASTROSCOPY, DUODENOSCOPY (EGD); Surgeon:DALE MIMS; [facility]     ESOPHAGOSCOPY, GASTROSCOPY, DUODENOSCOPY (EGD), COMBINED N/A 10/30/2014    Procedure: COMBINED ESOPHAGOSCOPY, GASTROSCOPY, DUODENOSCOPY (EGD), BIOPSY SINGLE OR MULTIPLE;  Surgeon: Sarai Moon MD;  [facility]     ESOPHAGOSCOPY, GASTROSCOPY, DUODENOSCOPY (EGD), COMBINED Left 7/6/2015    Procedure: COMBINED ESOPHAGOSCOPY, GASTROSCOPY, DUODENOSCOPY (EGD), BIOPSY SINGLE OR MULTIPLE;  Surgeon: Sean  Thomas Sanchez MD;  [facility]     ESOPHAGOSCOPY, GASTROSCOPY, DUODENOSCOPY (EGD), COMBINED N/A 7/8/2016    Procedure: COMBINED ESOPHAGOSCOPY, GASTROSCOPY, DUODENOSCOPY (EGD), BIOPSY SINGLE OR MULTIPLE;  Surgeon: Eloy Klein MD;  [facility]     ESOPHAGOSCOPY, GASTROSCOPY, DUODENOSCOPY (EGD), COMBINED N/A 8/4/2016    Procedure: COMBINED ESOPHAGOSCOPY, GASTROSCOPY, DUODENOSCOPY (EGD), BIOPSY SINGLE OR MULTIPLE;  Surgeon: Jason Brown MD;  [facility]     ESOPHAGOSCOPY, GASTROSCOPY, DUODENOSCOPY (EGD), COMBINED N/A 8/1/2017    Procedure: COMBINED ESOPHAGOSCOPY, GASTROSCOPY, DUODENOSCOPY (EGD);;  Surgeon: Deirdre Harris MD;  [facility]     ESOPHAGOSCOPY, GASTROSCOPY, DUODENOSCOPY (EGD), COMBINED N/A 6/12/2019    Procedure: ESOPHAGOGASTRODUODENOSCOPY (EGD);  Surgeon: Jose Francisco Perdomo MD;  [facility]     GYN SURGERY      Hysterectomy and USO     [facility] ENDOSCOPY W EUS  7/20/2011    Procedure:COMBINED ENDOSCOPIC ULTRASOUND, ESOPHAGOSCOPY, GASTROSCOPY, DUODENOSCOPY (EGD); Surgeon:DARVIN DONOHUE; [facility]     HERNIORRHAPHY VENTRAL N/A 9/15/2016    Procedure: HERNIORRHAPHY VENTRAL;  Surgeon: Juanita Bernabe MD;  [facility]     HYSTERECTOMY  1997 or 1998    USO     INCISION AND DRAINAGE ABDOMEN WASHOUT, COMBINED  8/16/2012    Procedure: COMBINED INCISION AND DRAINAGE ABDOMEN WASHOUT;  ,debridement and Drainage Post Appendectomy;  Surgeon: Ron Austin MD;  [facility]     INJECT TRANSVERSUS ABDOMINIS PLANE (TAP) BLOCK BILATERAL Bilateral 5/26/2016    Procedure: INJECT TRANSVERSUS ABDOMINIS PLANE (TAP) BLOCK BILATERAL;  Surgeon: Leonard Mccallum MD;  [facility]     LAPAROSCOPIC APPENDECTOMY  7/30/2012    Procedure: LAPAROSCOPIC APPENDECTOMY;  Open Appendectomy;  Surgeon: Ron Austin MD;  [facility]     PANCREATECTOMY, TRANSPLANT AUTO ISLET CELL, COMBINED  1/6/2012    Procedure:COMBINED PANCREATECTOMY,  TRANSPLANT AUTO ISLET CELL; Total  Pancreatectomy, Auto Islet Transplant, splenectomy, 18fr. transgastric-jejunal feeding tube placement, liver biopsy; Surgeon:PALAK LEE; [facility]     REPLACE GASTROSTOMY TUBE, PERCUTANEOUS N/A 8/30/2017    Procedure: REPLACE GASTROSTOMY TUBE, PERCUTANEOUS;  GJ Tube Change;  Surgeon: Jose Nath PA-C;  [facility]     SPLENECTOMY         Family History:  New changes since last visit:  none  Family History   Problem Relation Age of Onset     Hypertension Mother      Diabetes Mother      Osteoporosis Mother      Cancer Father         pancreatic cancer     Diabetes Maternal Grandmother      Cardiovascular Maternal Grandmother      Cancer Maternal Grandfather         lung cancer     Cancer Sister         brain     Cancer Sister         liver cancer       Social History:  Social History     Social History Narrative     Not on file      Lives in [place] with her . Previously noted family stress (2018).      Physical Exam:  Vitals: /77 (BP Location: Right arm, Patient Position: Sitting, Cuff Size: Adult Regular)   Pulse 91   Temp 98.6  F (37  C) (Oral)   Wt 67.2 kg (148 lb 3.2 oz)   SpO2 95%   BMI 27.11 kg/m     BMI= Body mass index is 27.11 kg/m .     General:  Well-appearing, NAD  Head: NC/AT  Eyes: sclera white  Neuro:  Normal mental status, normal gross motor  Psych:  Communicative, with normal affect     Results:      Mixed Meal Test:  C Peptide   Date Value Ref Range Status   04/18/2019 1.8 0.9 - 6.9 ng/mL Final   09/07/2018 2.8 0.9 - 6.9 ng/mL Final   09/06/2018 1.8 0.9 - 6.9 ng/mL Final   09/03/2018 0.2 (L) 0.9 - 6.9 ng/mL Final   08/28/2018 0.3 (L) 0.9 - 6.9 ng/mL Final     Glucose   Date Value Ref Range Status   11/21/2019 222 (H) 70 - 99 mg/dL Final   07/12/2019 317 (H) 70 - 99 mg/dL Final   06/12/2019 92 70 - 99 mg/dL Final   06/11/2019 142 (H) 70 - 99 mg/dL Final   06/10/2019 219 (H) 70 - 99 mg/dL Final       Hemoglobin  A1c  Lab Results   Component Value Date    A1C 6.7 11/21/2019    A1C 8.2 06/11/2019    A1C Canceled, Test credited 06/10/2019    A1C 9.6 04/18/2019    A1C 6.6 08/27/2018       Liver enzymes  Lab Results   Component Value Date    AST 64 07/12/2019     Lab Results   Component Value Date    ALT 48 07/12/2019     Lab Results   Component Value Date    BILICONJ 0.0 05/20/2014      Lab Results   Component Value Date    BILITOTAL 0.2 07/12/2019     Lab Results   Component Value Date    ALBUMIN 3.8 07/12/2019     Lab Results   Component Value Date    PROTTOTAL 7.4 07/12/2019      Lab Results   Component Value Date    ALKPHOS 117 07/12/2019       Creatinine:  Creatinine   Date Value Ref Range Status   11/21/2019 0.76 0.52 - 1.04 mg/dL Final   ]    Complete Blood Count:  CBC RESULTS:   Recent Labs   Lab Test 11/21/19  1000   WBC 6.2   RBC 3.94   HGB 11.6*   HCT 35.5   MCV 90   MCH 29.4   MCHC 32.7   RDW 14.8          Cholesterol  Lab Results   Component Value Date    CHOL 168 07/12/2019     Lab Results   Component Value Date    HDL 83 07/12/2019     Lab Results   Component Value Date    LDL 71 07/12/2019     Lab Results   Component Value Date    TRIG 73 07/12/2019     Lab Results   Component Value Date    CHOLHDLRATIO 3.2 01/08/2015           C-Zkpiyoc10/1/2017  [facility]   Component Name Value Ref Range   C-PEPTIDE  <0.1 (L)   Comment:    Test Performed by:  [facility]  [address] [place] [address] 1.1 - 4.4 ng/mL    Specimen   Blood     Simultaneous glucose 12/1/17 = 81 mg/dL      Assessment:  1. 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  Treated for central adrenal insufficiency.        Chantell is a 53 year old with history of chronic pancreatitis who is s/p total pancreatectomy and islet autotransplant, with insulin dependence (pump therapy) and partial islet graft function, complicated by insulin  resistance, and several episodes of severe hypoglycemia and seizures (E10.641).  She is treated with a continuous subcutaneous insulin infusion pump.  We had to stop this temporarily due to a pump failure.  Now w 670g, waiting to be trained to start.    Chantell has post-pancreatectomy diabetes-- essentially a surgical form of type 1 diabetes (E10.641).  She requires an insulin pump for management due to her multiple episodes of hypoglycemia and hypoglycemia unawareness, including episodes of seizures that required suspension of insulin pump and hospital admits. She also should have a continuous glucose monitor for frequent monitoring because of her severe hypoglycemia history.  Chantell continues to wear a CGM, and needs to be on CGM or test 4 times per day, and we documented during the clinic encounter today use of CGM from review of her logs.  Chantell requires frequent insulin adjustments to her insulin regimen based on her blood glucoses to control hyperglycemia and hypoglycemia.       At today's visit, Chantell restarted the 670G pump.  Fortunately she is doing OK with no further hypoglycemia.  I actually think she would be an excellent candidate for auto mode, as it may even help with her prandial excursions given her partial islet function.    Decided not to make any changes today to cortef.    Discussed other non diabetes issues as noted below today.        Plan:  1.  Changes to current diabetes regimen:  Patient Instructions   1)    Your A1c is 6.7% today so that is much better!    2)   In anticipation of eventually moving to auto mode, I want to get you bolusing to cover food, but we want to do it safely, so you don't drop too low.  So to start, we are setting the I:C ratio very small at 1 unit per 60 grams of carb.    3)  No other changes today.    4)  Agree with auto mode after holidays-- I think it will work very well for you.    5)  We are going to check hemoglobin and iron studies today due to recent recurrence of  bloody stool.  Since it has now resolved, if the labs are normal, I would have you see your primary doctor if your stool symptoms recurred.    6)  Leg cramps--  Try magnesium (400 mg) over the counter, two tabs per day  We will also check electrolytes.       Feb 20 at 1:40pm (Thursday)-- we are due also for fasting labs and mixed meal that day, so I will have you:  -- arrive at 10am for fasting labs  -- boost after labs and then the 1 hour and 2 hour blood draw  -- and then the afternoon appointment with me.      2.  Frequency of blood sugar checks:  Premeal, bedtime, and additional measurements PRN-- Should have strips sufficient to test 8 times per day given her labiltiy history.    3.  Continue routine follow up for autoislet transplant patients:  Mixed meal test (6 mL/kg BoostHP to max of 360 mL) at 3 months, 6 months, and once a year post transplant.  Hemoglobin A1c levels at these time points and quarterly.    4.  Other issues addressed today:  As above        Follow up: as above    Contact me for questions at [phone] or [phone].  Emergency number to reach pediatric endocrinology after hours is [phone].        Amena Maher MD  , Pediatric Endocrinology and Diabetes  [facility] [place]  [facility]      VISIT TIME:  25 minutes of face to face time, >50% spent in counseling and coordination of care on insulin plan.

## 2019-11-21 NOTE — NURSING NOTE
Chief Complaint   Patient presents with     RECHECK     Auto Islet         /77 (BP Location: Right arm, Patient Position: Sitting, Cuff Size: Adult Regular)   Pulse 91   Temp 98.6  F (37  C) (Oral)   Wt 67.2 kg (148 lb 3.2 oz)   SpO2 95%   BMI 27.11 kg/m      Tamiko Pace CMA [facility]    11/21/2019 9:18 AM

## 2019-11-21 NOTE — PROGRESS NOTES
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     Post-pancreatectomy diabetes (H)     Type 1 diabetes mellitus with hypoglycemia and without coma (H)     Migraine     Abdominal muscle strain     CIERRA (generalized anxiety disorder)     Major depressive disorder, recurrent episode, moderate (H)     CMC DJD(carpometacarpal degenerative joint disease), localized primary     Exocrine pancreatic insufficiency     Hypothyroidism     Hypoglycemia     Mood disorder due to a general medical condition     Decreased oral intake     Odynophagia     Iron deficiency     Anemia, iron deficiency     Adrenal insufficiency (H)     S/P hernia repair     Nausea     Chondromalacia of left patella       HPI:  Chantell is a 53 year old female here for follow up of total pancreatectomy and islet autotransplant performed on January 6, 2012.  At the time of the procedure, the patient received 420,000 IEQ, or 5,438 IEQ/kg body weight.  She did not have diabetes before the procedure.  Early post-operative course was complicated by RLQ with appendicitis, eventually required appendectomy.    Despite the high islet mass transplanted, Chantell's post-operative course was initially remarkable for high insulin needs.  She in fact does have islet function, as previously documented by mixed meal tests, but she was insulin resistant, requiring high doses of insulin when on MDI therapy.  She also had frequent day to day variability, and fluctuating patterns with illness and healthier times, as well as a complex regimen, all of which make her an excellent candidate for an insulin pump which she started in Feb 2014.  Extremely concerning  was an episode of severe hypoglycemia in November 2013 at which time she was found unconscious.  Suspect at that time she was on too much basal insulin and because she was ill and not eating at the time, dropped far too low overnight.   In early 2014, she was started on an insulin pump with CGM.  Remarkably, her insulin needs have dropped dramatically on an insulin pump.  She was then relatively stable until 2016.  She was again admitted to the hospital on March 15 and March 29, 2016 for hypoglycemia, that appears to be secondary to both exogenous insulin and possible central adrenal insufficiency.   At that time she had the following lab findings:  On 3/29/16, elevated insulin with low C-peptide during BG 42 mg/dL around 5pm, and then again same pattern with BG 50s at 10pm.  Chantell is pretty clear that she did not take insuiln that day on 3/29/16. She also had three cortisol levels-- including one during hypoglycemia, one at around 4am (possibly also during hypoglycemia) and one 8am draw that were all low-- all 0.6- 1.6 range.    Of note, she did have a kenalog injection one week earlier on 3/24/16, just a few days prior to that draw and was also receiving narcotics in hospital (but not at home).  She has since had another severe hypogylcemic episode in Jan 2017 -- did not lose consciousness but was disoriented and unable to get help for herself at the store.  And again was admitted for severe hypoglycemia at [facility] on 11/29/2017 (refer to 12/13/17 note) with labs consistent with exogenous insulin overdose although she denied taking any excess insulin (12/1 C-peptide <0.1 and insulin .64.7, 11/30 C-peptide 0.5 and insulin 91). Additional hypoglycemia admissions: 8/26/18, 9/3/18, with high insulin and low C-peptide c/w hypoglycemia secondary to exogenous insulin (9/3/18 at 6:41pm about 20 hours after last reported insulin dose; insulin 45.6 mU/L, C-peptide 0.2 ng/mL).    Picture is also complicated by  non-diabetes history which includes the following :  - 7/6/2016 EGD identified diffuse candidiasis through entire esophagous.  Pt has also had recurrent oral thrush in 2016  - Recurrent chronic abdominal pain  - Recurrent vomiting of unclear etiology but G-T placed 10/2016 and converted to GJ 11/2016 with symptomatic improvement  Unclear if she has any gastroparesis.  Suboptimal study in March 2016 showed 100% retention at 1 hour and then rapid emptying.    - Hernia repair performed by general surgery 9/15/16 (TPIAT team not involved).    - Chronic abdominal pain      New history today:  1)  Diabetes:  Was admitted to hospital 6/10/19 for abdominal pain. Her glucose at that time was 219 mg/dL on admission but dropped to 34 mg/dL without any insulin given, reportedly. She was then taken off her insulin pump and placed on MDI, with consideration also for oral meds (SGLT-2). She was asked by hospital endo to remain off her pump until she saw me back in clinic. Unfortunately, she was scheduled to see me on 8/1/19 and 8/6/19 but did not make either visit. She did restart her pump on her own several days after hospital discharge. She has been largely using just basal insulin, but clearly she has some post prandial hyperglycemia.  She has had no real issues with hypoglycemia.  She has been working with a diabetes educator and plans to switch to auto mode in January (in conjunction with [place] CDE).    Settings 670G  Basal 0.5 unit/hour  Bolus:  Set at I:C ratio 25g,  mg/dL, target   Today daily insulin dose 12 units per day;  On average all basal 12 unit, 0 bolus. -- reviewing day to day, she has 4 days out of 14 with 1-2 bolus totaling <1 unit bolus    Mean CGM Glucose 161 mg/dL, SD 65 mg/dL, 66% time in range, 2% below 70,  22% at 180-250 and 10% above 250  She does have a gap without sensor, but sensor wear of 51% of the time in this download.       2)  Adrenal insufficiency:  Still unclear if this was  transient or true central AI but we have been treating her regardless due to SHE history.  We have maintained her on 20 mg/day (10 BID) of cortef.  While long-term goal is to wean off of this and retest, we have not made changes due to episodes of hypoglycemia.    3)  Other reviewed today:   - stable abdominal pain issues- no opioids  - continues to focus on eating healthy  - had from Sunday to Tuesday blood in stool- maroon stool.  This has now resolved.  She did not want to go to ED and she had called primary clinic but no appointments available. No symptoms past two days.  She had EGD in the past that was normal.  - complains of bad muscle cramps, mostly calves, feet, some arms      Review of systems:  Review Of Systems  Skin: negative  Eyes: negative  Ears/Nose/Throat: negative  Respiratory: No shortness of breath, dyspnea on exertion, cough, or hemoptysis  Cardiovascular: negative  Gastrointestinal: chronic abdominal pain as aboeve  Genitourinary: negative  Musculoskeletal: muscle cramps  Neurologic: negative  Psychiatric: negative  Hematologic/Lymphatic/Immunologic: negative  Endocrine: as above    Past Medical and Surgical History:  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    TPIAT     Spasm of sphincter of Oddi      Past Surgical History:   Procedure Laterality Date     ARTHROPLASTY CARPOMETACARPAL (THUMB JOINT)  5/2/2014    Procedure: ARTHROPLASTY CARPOMETACARPAL (THUMB JOINT);  Surgeon: Carina Panda MD;  Location: [facility]     CHOLECYSTECTOMY  2004     COLONOSCOPY  7/18/2014    Procedure: COLONOSCOPY;  Surgeon: Luis Alberto  Aurora VALDES MD;  [facility]: [facility]     COLONOSCOPY N/A 8/1/2017    Procedure: COLONOSCOPY;  Colonoscopy and upper endoscopy;  Surgeon: Deirdre Harris MD;  [facility]: [facility]     ENDOSCOPIC RETROGRADE CHOLANGIOPANCREATOGRAM       ENDOSCOPIC RETROGRADE CHOLANGIOPANCREATOGRAM  4/19/2011    Procedure:ENDOSCOPIC RETROGRADE CHOLANGIOPANCREATOGRAM; Pancreatic Stent Placement       ENDOSCOPIC RETROGRADE CHOLANGIOPANCREATOGRAM  5/26/2011    Procedure:ENDOSCOPIC RETROGRADE CHOLANGIOPANCREATOGRAM; with Pancreatic Stent Removal; Surgeon:DALE MIMS; [facility]     ENDOSCOPY UPPER, COLONOSCOPY, COMBINED  4/25/2012    Procedure:COMBINED ENDOSCOPY UPPER, COLONOSCOPY; Enteroscopy with Bile Duct Stent Removal, Colonoscopy  *[facility]x Safe Room*; Surgeon:GRACY GODWINIQ; [facility]     ESOPHAGOSCOPY, GASTROSCOPY, DUODENOSCOPY (EGD), COMBINED  5/26/2011    Procedure:COMBINED ESOPHAGOSCOPY, GASTROSCOPY, DUODENOSCOPY (EGD); Surgeon:DALE MIMS; [facility]     ESOPHAGOSCOPY, GASTROSCOPY, DUODENOSCOPY (EGD), COMBINED N/A 10/30/2014    Procedure: COMBINED ESOPHAGOSCOPY, GASTROSCOPY, DUODENOSCOPY (EGD), BIOPSY SINGLE OR MULTIPLE;  Surgeon: Sarai Moon MD;  [facility]     ESOPHAGOSCOPY, GASTROSCOPY, DUODENOSCOPY (EGD), COMBINED Left 7/6/2015    Procedure: COMBINED ESOPHAGOSCOPY, GASTROSCOPY, DUODENOSCOPY (EGD), BIOPSY SINGLE OR MULTIPLE;  Surgeon: Thomas Estrada MD;  [facility]     ESOPHAGOSCOPY, GASTROSCOPY, DUODENOSCOPY (EGD), COMBINED N/A 7/8/2016    Procedure: COMBINED ESOPHAGOSCOPY, GASTROSCOPY, DUODENOSCOPY (EGD), BIOPSY SINGLE OR MULTIPLE;  Surgeon: Eloy Klein MD;  [facility]     ESOPHAGOSCOPY, GASTROSCOPY, DUODENOSCOPY (EGD), COMBINED N/A 8/4/2016    Procedure: COMBINED ESOPHAGOSCOPY, GASTROSCOPY, DUODENOSCOPY (EGD), BIOPSY SINGLE OR MULTIPLE;  Surgeon: Jason Brown MD;  [facility]     ESOPHAGOSCOPY, GASTROSCOPY,  DUODENOSCOPY (EGD), COMBINED N/A 8/1/2017    Procedure: COMBINED ESOPHAGOSCOPY, GASTROSCOPY, DUODENOSCOPY (EGD);;  Surgeon: Deirdre Harris MD;  [facility]     ESOPHAGOSCOPY, GASTROSCOPY, DUODENOSCOPY (EGD), COMBINED N/A 6/12/2019    Procedure: ESOPHAGOGASTRODUODENOSCOPY (EGD);  Surgeon: Jose Francisco Perdomo MD;  [facility]     GYN SURGERY      Hysterectomy and USO     [facility] ENDOSCOPY W EUS  7/20/2011    Procedure:COMBINED ENDOSCOPIC ULTRASOUND, ESOPHAGOSCOPY, GASTROSCOPY, DUODENOSCOPY (EGD); Surgeon:DARVIN DONOHUE; [facility]     HERNIORRHAPHY VENTRAL N/A 9/15/2016    Procedure: HERNIORRHAPHY VENTRAL;  Surgeon: Juanita Bernabe MD;  [facility]     HYSTERECTOMY  1997 or 1998    USO     INCISION AND DRAINAGE ABDOMEN WASHOUT, COMBINED  8/16/2012    Procedure: COMBINED INCISION AND DRAINAGE ABDOMEN WASHOUT;  ,debridement and Drainage Post Appendectomy;  Surgeon: Ron Austin MD;  [facility]     INJECT TRANSVERSUS ABDOMINIS PLANE (TAP) BLOCK BILATERAL Bilateral 5/26/2016    Procedure: INJECT TRANSVERSUS ABDOMINIS PLANE (TAP) BLOCK BILATERAL;  Surgeon: Leonard Mccallum MD;  [facility]     LAPAROSCOPIC APPENDECTOMY  7/30/2012    Procedure: LAPAROSCOPIC APPENDECTOMY;  Open Appendectomy;  Surgeon: Ron Austin MD;  [facility]     PANCREATECTOMY, TRANSPLANT AUTO ISLET CELL, COMBINED  1/6/2012    Procedure:COMBINED PANCREATECTOMY, TRANSPLANT AUTO ISLET CELL; Total  Pancreatectomy, Auto Islet Transplant, splenectomy, 18fr. transgastric-jejunal feeding tube placement, liver biopsy; Surgeon:PALAK LEE; [facility]     REPLACE GASTROSTOMY TUBE, PERCUTANEOUS N/A 8/30/2017    Procedure: REPLACE GASTROSTOMY TUBE, PERCUTANEOUS;  GJ Tube Change;  Surgeon: Jose Nath PA-C;  [facility]     SPLENECTOMY         Family History:  New changes since last visit:  none  Family History   Problem Relation Age of Onset     Hypertension  Mother      Diabetes Mother      Osteoporosis Mother      Cancer Father         pancreatic cancer     Diabetes Maternal Grandmother      Cardiovascular Maternal Grandmother      Cancer Maternal Grandfather         lung cancer     Cancer Sister         brain     Cancer Sister         liver cancer       Social History:  Social History     Social History Narrative     Not on file      Lives in [place] with her . Previously noted family stress (2018).      Physical Exam:  Vitals: /77 (BP Location: Right arm, Patient Position: Sitting, Cuff Size: Adult Regular)   Pulse 91   Temp 98.6  F (37  C) (Oral)   Wt 67.2 kg (148 lb 3.2 oz)   SpO2 95%   BMI 27.11 kg/m    BMI= Body mass index is 27.11 kg/m .     General:  Well-appearing, NAD  Head: NC/AT  Eyes: sclera white  Neuro:  Normal mental status, normal gross motor  Psych:  Communicative, with normal affect     Results:      Mixed Meal Test:  C Peptide   Date Value Ref Range Status   04/18/2019 1.8 0.9 - 6.9 ng/mL Final   09/07/2018 2.8 0.9 - 6.9 ng/mL Final   09/06/2018 1.8 0.9 - 6.9 ng/mL Final   09/03/2018 0.2 (L) 0.9 - 6.9 ng/mL Final   08/28/2018 0.3 (L) 0.9 - 6.9 ng/mL Final     Glucose   Date Value Ref Range Status   11/21/2019 222 (H) 70 - 99 mg/dL Final   07/12/2019 317 (H) 70 - 99 mg/dL Final   06/12/2019 92 70 - 99 mg/dL Final   06/11/2019 142 (H) 70 - 99 mg/dL Final   06/10/2019 219 (H) 70 - 99 mg/dL Final       Hemoglobin A1c  Lab Results   Component Value Date    A1C 6.7 11/21/2019    A1C 8.2 06/11/2019    A1C Canceled, Test credited 06/10/2019    A1C 9.6 04/18/2019    A1C 6.6 08/27/2018       Liver enzymes  Lab Results   Component Value Date    AST 64 07/12/2019     Lab Results   Component Value Date    ALT 48 07/12/2019     Lab Results   Component Value Date    BILICONJ 0.0 05/20/2014      Lab Results   Component Value Date    BILITOTAL 0.2 07/12/2019     Lab Results   Component Value Date    ALBUMIN 3.8 07/12/2019     Lab Results    Component Value Date    PROTTOTAL 7.4 07/12/2019      Lab Results   Component Value Date    ALKPHOS 117 07/12/2019       Creatinine:  Creatinine   Date Value Ref Range Status   11/21/2019 0.76 0.52 - 1.04 mg/dL Final   ]    Complete Blood Count:  CBC RESULTS:   Recent Labs   Lab Test 11/21/19  1000   WBC 6.2   RBC 3.94   HGB 11.6*   HCT 35.5   MCV 90   MCH 29.4   MCHC 32.7   RDW 14.8          Cholesterol  Lab Results   Component Value Date    CHOL 168 07/12/2019     Lab Results   Component Value Date    HDL 83 07/12/2019     Lab Results   Component Value Date    LDL 71 07/12/2019     Lab Results   Component Value Date    TRIG 73 07/12/2019     Lab Results   Component Value Date    CHOLHDLRATIO 3.2 01/08/2015           C-Thjefzx69/1/2017  [facility]   Component Name Value Ref Range   C-PEPTIDE  <0.1 (L)   Comment:    Test Performed by:  [facility]  [address] [place] [address] 1.1 - 4.4 ng/mL    Specimen   Blood     Simultaneous glucose 12/1/17 = 81 mg/dL      Assessment:  1. 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  Treated for central adrenal insufficiency.        Chantell is a 53 year old with history of chronic pancreatitis who is s/p total pancreatectomy and islet autotransplant, with insulin dependence (pump therapy) and partial islet graft function, complicated by insulin resistance, and several episodes of severe hypoglycemia and seizures (E10.641).  She is treated with a continuous subcutaneous insulin infusion pump.  We had to stop this temporarily due to a pump failure.  Now w 670g, waiting to be trained to start.    Chantell has post-pancreatectomy diabetes-- essentially a surgical form of type 1 diabetes (E10.641).  She requires an insulin pump for management due to her multiple episodes of hypoglycemia and hypoglycemia unawareness, including episodes of seizures that required  suspension of insulin pump and hospital admits. She also should have a continuous glucose monitor for frequent monitoring because of her severe hypoglycemia history.  Chantell continues to wear a CGM, and needs to be on CGM or test 4 times per day, and we documented during the clinic encounter today use of CGM from review of her logs.  Chantell requires frequent insulin adjustments to her insulin regimen based on her blood glucoses to control hyperglycemia and hypoglycemia.       At today's visit, Chantell restarted the 670G pump.  Fortunately she is doing OK with no further hypoglycemia.  I actually think she would be an excellent candidate for auto mode, as it may even help with her prandial excursions given her partial islet function.    Decided not to make any changes today to cortef.    Discussed other non diabetes issues as noted below today.        Plan:  1.  Changes to current diabetes regimen:  Patient Instructions   1)    Your A1c is 6.7% today so that is much better!    2)   In anticipation of eventually moving to auto mode, I want to get you bolusing to cover food, but we want to do it safely, so you don't drop too low.  So to start, we are setting the I:C ratio very small at 1 unit per 60 grams of carb.    3)  No other changes today.    4)  Agree with auto mode after holidays-- I think it will work very well for you.    5)  We are going to check hemoglobin and iron studies today due to recent recurrence of bloody stool.  Since it has now resolved, if the labs are normal, I would have you see your primary doctor if your stool symptoms recurred.    6)  Leg cramps--  Try magnesium (400 mg) over the counter, two tabs per day  We will also check electrolytes.       Feb 20 at 1:40pm (Thursday)-- we are due also for fasting labs and mixed meal that day, so I will have you:  -- arrive at 10am for fasting labs  -- boost after labs and then the 1 hour and 2 hour blood draw  -- and then the afternoon appointment with  me.      2.  Frequency of blood sugar checks:  Premeal, bedtime, and additional measurements PRN-- Should have strips sufficient to test 8 times per day given her labiltiy history.    3.  Continue routine follow up for autoislet transplant patients:  Mixed meal test (6 mL/kg BoostHP to max of 360 mL) at 3 months, 6 months, and once a year post transplant.  Hemoglobin A1c levels at these time points and quarterly.    4.  Other issues addressed today:  As above        Follow up: as above    Contact me for questions at [phone] or [phone].  Emergency number to reach pediatric endocrinology after hours is [phone].        Amena Maher MD  , Pediatric Endocrinology and Diabetes  [facility]ly [facility]  [facility]      VISIT TIME:  25 minutes of face to face time, >50% spent in counseling and coordination of care on insulin plan.

## 2019-11-21 NOTE — PATIENT INSTRUCTIONS
1)    Your A1c is 6.7% today so that is much better!    2)   In anticipation of eventually moving to auto mode, I want to get you bolusing to cover food, but we want to do it safely, so you don't drop too low.  So to start, we are setting the I:C ratio very small at 1 unit per 60 grams of carb.    3)  No other changes today.    4)  Agree with auto mode after holidays-- I think it will work very well for you.    5)  We are going to check hemoglobin and iron studies today due to recent recurrence of bloody stool.  Since it has now resolved, if the labs are normal, I would have you see your primary doctor if your stool symptoms recurred.    6)  Leg cramps--  Try magnesium (400 mg) over the counter, two tabs per day  We will also check electrolytes.       Feb 20 at 1:40pm (Thursday)-- we are due also for fasting labs and mixed meal that day, so I will have you:  -- arrive at 10am for fasting labs  -- boost after labs and then the 1 hour and 2 hour blood draw  -- and then the afternoon appointment with me.

## 2019-11-21 NOTE — PROGRESS NOTES
No chief complaint on file.      /77 (BP Location: Right arm, Patient Position: Sitting, Cuff Size: Adult Regular)   Pulse 91   Temp 98.6  F (37  C) (Oral)   SpO2 95%     Tamiko Pace CMA CMA    11/21/2019 9:15 AM

## 2019-11-21 NOTE — PROGRESS NOTES
Chief Complaint   Patient presents with     Allied Health Visit     POC A1C     A1C 11/21/19: 6.7%    Honey Farmer, CMA

## 2020-01-08 RX ORDER — DIPHENHYDRAMINE HCL 25 MG
25 TABLET ORAL
Start: 2020-01-08

## 2020-01-08 RX ORDER — ACETAMINOPHEN 325 MG/1
650 TABLET ORAL
Start: 2020-01-08

## 2020-01-24 NOTE — TELEPHONE ENCOUNTER
linaclotide (LINZESS) 145 mcg caps      Last Written Prescription Date:  3-7-17  Last Fill Quantity: 30,   # refills: 1  Last Office Visit : 12-20-16  Future Office visit:  none    Routing refill request to provider for review/approval because:  Drug not on the [facility], [facility] or [facility] refill protocol.      Kathleen M Doege RN       Nsaids Counseling: NSAID Counseling: I discussed with the patient that NSAIDs should be taken with food. Prolonged use of NSAIDs can result in the development of stomach ulcers.  Patient advised to stop taking NSAIDs if abdominal pain occurs.  The patient verbalized understanding of the proper use and possible adverse effects of NSAIDs.  All of the patient's questions and concerns were addressed.

## 2020-02-03 DIAGNOSIS — F51.01 PRIMARY INSOMNIA: ICD-10-CM

## 2020-02-03 DIAGNOSIS — K59.00 CONSTIPATION, UNSPECIFIED CONSTIPATION TYPE: ICD-10-CM

## 2020-02-03 DIAGNOSIS — M54.9 CHRONIC BACK PAIN, UNSPECIFIED BACK LOCATION, UNSPECIFIED BACK PAIN LATERALITY: ICD-10-CM

## 2020-02-03 DIAGNOSIS — G89.29 CHRONIC BACK PAIN, UNSPECIFIED BACK LOCATION, UNSPECIFIED BACK PAIN LATERALITY: ICD-10-CM

## 2020-02-03 DIAGNOSIS — R53.81 PHYSICAL DECONDITIONING: ICD-10-CM

## 2020-02-03 NOTE — TELEPHONE ENCOUNTER
traZODone (DESYREL) 100 MG tablet  Last Written Prescription Date:  6/6/2019  Last Fill Quantity: 100,   # refills: 1  Last Office Visit : 6/26/2019  Future Office visit:  None    Routing refill request to provider for review/approval because:                        Drug-Drug HIGH Alert                        Trazodone & Duloxetine                    Referring to Provider for review and refills.     Kalli Ge RN  Central Triage Red Flags/Med Refills

## 2020-02-04 RX ORDER — TRAZODONE HYDROCHLORIDE 100 MG/1
TABLET ORAL
Qty: 100 TABLET | Refills: 1 | Status: SHIPPED | OUTPATIENT
Start: 2020-02-04 | End: 2021-02-25

## 2020-02-23 ENCOUNTER — HEALTH MAINTENANCE LETTER (OUTPATIENT)
Age: 55
End: 2020-02-23

## 2020-04-02 ENCOUNTER — VIRTUAL VISIT (OUTPATIENT)
Dept: TRANSPLANT | Facility: CLINIC | Age: 55
End: 2020-04-02
Attending: PEDIATRICS
Payer: MEDICARE

## 2020-04-02 DIAGNOSIS — E10.649 HYPOGLYCEMIA UNAWARENESS IN TYPE 1 DIABETES MELLITUS (H): Primary | ICD-10-CM

## 2020-04-02 NOTE — PROGRESS NOTES
"Chantell Kidd is a 54 year old female who is being evaluated via a billable telephone visit.      The patient has been notified of following:     \"This telephone visit will be conducted via a call between you and your physician/provider. We have found that certain health care needs can be provided without the need for a physical exam.  This service lets us provide the care you need with a short phone conversation.  If a prescription is necessary we can send it directly to your pharmacy.  If lab work is needed we can place an order for that and you can then stop by our lab to have the test done at a later time.    If during the course of the call the physician/provider feels a telephone visit is not appropriate, you will not be charged for this service.\"     Patient has given verbal consent for Telephone visit?  Yes    Chantell Kidd complains of  No chief complaint on file.      I have reviewed and updated the patient's Past Medical History, Social History, Family History and Medication List.    ALLERGIES  Corticosteroids and Chocolate flavor    Additional provider notes    HPI:  Chantell is a 54 year old female here for follow up of total pancreatectomy and islet autotransplant performed on January 6, 2012.  At the time of the procedure, the patient received 420,000 IEQ, or 5,438 IEQ/kg body weight.  She did not have diabetes before the procedure.  Early post-operative course was complicated by RLQ with appendicitis, eventually required appendectomy.     Despite the high islet mass transplanted, Chantell's post-operative course was initially remarkable for high insulin needs.  She in fact does have islet function, as previously documented by mixed meal tests, but she was insulin resistant, requiring high doses of insulin when on MDI therapy.  She also had frequent day to day variability, and fluctuating patterns with illness and healthier times, as well as a complex regimen, all of which make her an excellent candidate for an " insulin pump which she started in Feb 2014.  Extremely concerning was an episode of severe hypoglycemia in November 2013 at which time she was found unconscious.  Suspect at that time she was on too much basal insulin and because she was ill and not eating at the time, dropped far too low overnight.   In early 2014, she was started on an insulin pump with CGM.  Remarkably, her insulin needs have dropped dramatically on an insulin pump.  She was then relatively stable until 2016.  She was again admitted to the hospital on March 15 and March 29, 2016 for hypoglycemia, that appears to be secondary to both exogenous insulin and possible central adrenal insufficiency.   At that time she had the following lab findings:  On 3/29/16, elevated insulin with low C-peptide during BG 42 mg/dL around 5pm, and then again same pattern with BG 50s at 10pm.  Chantell is pretty clear that she did not take insuiln that day on 3/29/16. She also had three cortisol levels-- including one during hypoglycemia, one at around 4am (possibly also during hypoglycemia) and one 8am draw that were all low-- all 0.6- 1.6 range.    Of note, she did have a kenalog injection one week earlier on 3/24/16, just a few days prior to that draw and was also receiving narcotics in hospital (but not at home).  She has since had another severe hypogylcemic episode in Jan 2017 -- did not lose consciousness but was disoriented and unable to get help for herself at the store.  And again was admitted for severe hypoglycemia at [facility] on 11/29/2017 (refer to 12/13/17 note) with labs consistent with exogenous insulin overdose although she denied taking any excess insulin (12/1 C-peptide <0.1 and insulin .64.7, 11/30 C-peptide 0.5 and insulin 91). Additional hypoglycemia admissions: 8/26/18, 9/3/18, with high insulin and low C-peptide c/w hypoglycemia secondary to exogenous insulin (9/3/18 at 6:41pm about 20 hours after last reported insulin dose; insulin 45.6  mU/L, C-peptide 0.2 ng/mL).     Picture is also complicated by non-diabetes history which includes the following :  - 7/6/2016 EGD identified diffuse candidiasis through entire esophagous.  Pt has also had recurrent oral thrush in 2016  - Recurrent chronic abdominal pain  - Recurrent vomiting of unclear etiology but G-T placed 10/2016 and converted to GJ 11/2016 with symptomatic improvement  Unclear if she has any gastroparesis.  Suboptimal study in March 2016 showed 100% retention at 1 hour and then rapid emptying.    - Hernia repair performed by general surgery 9/15/16 (TPIAT team not involved).    - Chronic abdominal pain        New history today:  1)  Diabetes:  No hospital admits since I last saw her!   Running towards hyperglycemia.  This is as noted below. Somewhat unclear what her pump settings are.  What she emailed me is actually quite different from what was in the pump when we went in to change her settings. I am hoping to get better information when she is linked to [employer] and I can see the actual data. She is not having any hypoglycemia now.  She is wearing her CGM though I do not yet have the data.  She is interested in auto mode but has not changed.  She reports using meal coverage consistently.      Settings 670G  Basal 0.675 unit/hour  ?per email  Bolus:  Set at I:C ratio 60g (email said 32g),  mg/dL, target   Today daily insulin dose unclear       Numbers from patient    My  blood sugars-     292, 272, 302 today,    266,239,224,316,232,324    251,197,302,246,220,277    159,448,446,213,333, 545    425,355,321,293,258,396    202,233,243,293,383,251    409,269,291,250,256,393    196,192,210,266,282,188    222,210,212,198,199,202    299,310,289,266,302,334            2)  Adrenal insufficiency:  Still unclear if this was transient or true central AI but we have been treating her regardless due to SHE history.  We have maintained her on 20 mg/day (10 BID) of cortef.  While long-term goal  is to wean off of this and retest, we have not made changes due to episodes of hypoglycemia.     3)  Other reviewed today:   - walking 2-3 times per day        Review of systems:  Review Of Systems  Skin: negative  Eyes: negative  Ears/Nose/Throat: negative  Respiratory: No shortness of breath, dyspnea on exertion, cough, or hemoptysis  Cardiovascular: negative  Gastrointestinal: chronic abdominal pain as aboeve  Genitourinary: negative  Musculoskeletal: muscle cramps  Neurologic: negative  Psychiatric: negative  Hematologic/Lymphatic/Immunologic: negative  Endocrine: as above     Past Medical and Surgical History:  Past Medical History        Past Medical History:   Diagnosis Date     Chronic abdominal pain       Chronic pancreatitis (H)       S/P pancreatectomy     Depression with anxiety       Gastro-oesophageal reflux disease       Hypothyroidism 4/23/2015     Kidney stones       Low serum cortisol level (H)       Migraines       Other chronic pain       STOMACH     Other chronic pain       LUMBAR SPINE     Peripheral neuropathy       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     TPIAT     Spasm of sphincter of Oddi          Past Surgical History         Past Surgical History:   Procedure Laterality Date     ARTHROPLASTY CARPOMETACARPAL (THUMB JOINT)   5/2/2014     Procedure: ARTHROPLASTY CARPOMETACARPAL (THUMB JOINT);  Surgeon: Carina Panda MD;  Location: [facility] OR     CHOLECYSTECTOMY   2004     COLONOSCOPY   7/18/2014     Procedure: COLONOSCOPY;  Surgeon: Aurora Sahu MD;  Location:  GI     COLONOSCOPY N/A 8/1/2017     Procedure: COLONOSCOPY;  Colonoscopy and upper endoscopy;  Surgeon: Deirdre Harris MD;  Location:  GI     ENDOSCOPIC RETROGRADE CHOLANGIOPANCREATOGRAM         ENDOSCOPIC RETROGRADE CHOLANGIOPANCREATOGRAM   4/19/2011     Procedure:ENDOSCOPIC RETROGRADE CHOLANGIOPANCREATOGRAM; Pancreatic Stent Placement        ENDOSCOPIC RETROGRADE CHOLANGIOPANCREATOGRAM    5/26/2011     Procedure:ENDOSCOPIC RETROGRADE CHOLANGIOPANCREATOGRAM; with Pancreatic Stent Removal; Surgeon:DALE MIMS; Location:[facility]     ENDOSCOPY UPPER, COLONOSCOPY, COMBINED   4/25/2012     Procedure:COMBINED ENDOSCOPY UPPER, COLONOSCOPY; Enteroscopy with Bile Duct Stent Removal, Colonoscopy  *Latex Safe Room*; Surgeon:GRACY GODWIN; Location[facility]     ESOPHAGOSCOPY, GASTROSCOPY, DUODENOSCOPY (EGD), COMBINED   5/26/2011     Procedure:COMBINED ESOPHAGOSCOPY, GASTROSCOPY, DUODENOSCOPY (EGD); Surgeon:DALE MIMS; [facility]     ESOPHAGOSCOPY, GASTROSCOPY, DUODENOSCOPY (EGD), COMBINED N/A 10/30/2014     Procedure: COMBINED ESOPHAGOSCOPY, GASTROSCOPY, DUODENOSCOPY (EGD), BIOPSY SINGLE OR MULTIPLE;  Surgeon: Sarai Moon MD;  [facility]     ESOPHAGOSCOPY, GASTROSCOPY, DUODENOSCOPY (EGD), COMBINED Left 7/6/2015     Procedure: COMBINED ESOPHAGOSCOPY, GASTROSCOPY, DUODENOSCOPY (EGD), BIOPSY SINGLE OR MULTIPLE;  Surgeon: Thomas Estrada MD;  [facility]     ESOPHAGOSCOPY, GASTROSCOPY, DUODENOSCOPY (EGD), COMBINED N/A 7/8/2016     Procedure: COMBINED ESOPHAGOSCOPY, GASTROSCOPY, DUODENOSCOPY (EGD), BIOPSY SINGLE OR MULTIPLE;  Surgeon: Eloy Klein MD;  [facility]     ESOPHAGOSCOPY, GASTROSCOPY, DUODENOSCOPY (EGD), COMBINED N/A 8/4/2016     Procedure: COMBINED ESOPHAGOSCOPY, GASTROSCOPY, DUODENOSCOPY (EGD), BIOPSY SINGLE OR MULTIPLE;  Surgeon: Jason Brown MD;  [facility]     ESOPHAGOSCOPY, GASTROSCOPY, DUODENOSCOPY (EGD), COMBINED N/A 8/1/2017     Procedure: COMBINED ESOPHAGOSCOPY, GASTROSCOPY, DUODENOSCOPY (EGD);;  Surgeon: Deirdre Harris MD;  [facility]     ESOPHAGOSCOPY, GASTROSCOPY, DUODENOSCOPY (EGD), COMBINED N/A 6/12/2019     Procedure: ESOPHAGOGASTRODUODENOSCOPY (EGD);  Surgeon: Jose Francisco Perdomo MD;  [facility]     GYN SURGERY         Hysterectomy and USO     [facility] ENDOSCOPY W EUS    7/20/2011     Procedure:COMBINED ENDOSCOPIC ULTRASOUND, ESOPHAGOSCOPY, GASTROSCOPY, DUODENOSCOPY (EGD); Surgeon:DARVIN DONOHUE; [facility]     HERNIORRHAPHY VENTRAL N/A 9/15/2016     Procedure: HERNIORRHAPHY VENTRAL;  Surgeon: Juanita Bernabe MD;  [facility]     HYSTERECTOMY   1997 or 1998     USO     INCISION AND DRAINAGE ABDOMEN WASHOUT, COMBINED   8/16/2012     Procedure: COMBINED INCISION AND DRAINAGE ABDOMEN WASHOUT;  ,debridement and Drainage Post Appendectomy;  Surgeon: Ron Austin MD;  [facility]     INJECT TRANSVERSUS ABDOMINIS PLANE (TAP) BLOCK BILATERAL Bilateral 5/26/2016     Procedure: INJECT TRANSVERSUS ABDOMINIS PLANE (TAP) BLOCK BILATERAL;  Surgeon: Leonard Mccallum MD;  [facility]     LAPAROSCOPIC APPENDECTOMY   7/30/2012     Procedure: LAPAROSCOPIC APPENDECTOMY;  Open Appendectomy;  Surgeon: Ron Austin MD;  [facility]     PANCREATECTOMY, TRANSPLANT AUTO ISLET CELL, COMBINED   1/6/2012     Procedure:COMBINED PANCREATECTOMY, TRANSPLANT AUTO ISLET CELL; Total  Pancreatectomy, Auto Islet Transplant, splenectomy, 18fr. transgastric-jejunal feeding tube placement, liver biopsy; Surgeon:PALKA LEE; [facility]     REPLACE GASTROSTOMY TUBE, PERCUTANEOUS N/A 8/30/2017     Procedure: REPLACE GASTROSTOMY TUBE, PERCUTANEOUS;  GJ Tube Change;  Surgeon: Jose Nath PA-C;  [facility]     SPLENECTOMY               Family History:  New changes since last visit:  none  Family History         Family History   Problem Relation Age of Onset     Hypertension Mother       Diabetes Mother       Osteoporosis Mother       Cancer Father           pancreatic cancer     Diabetes Maternal Grandmother       Cardiovascular Maternal Grandmother       Cancer Maternal Grandfather           lung cancer     Cancer Sister           brain     Cancer Sister           liver cancer           Social History:  Social History          Social History  Narrative     Not on file      Lives in [place] with her . Previously noted family stress (2018).        Physical Exam:  Not done due to phone visit     Results:       Mixed Meal Test:        C Peptide   Date Value Ref Range Status   04/18/2019 1.8 0.9 - 6.9 ng/mL Final   09/07/2018 2.8 0.9 - 6.9 ng/mL Final   09/06/2018 1.8 0.9 - 6.9 ng/mL Final   09/03/2018 0.2 (L) 0.9 - 6.9 ng/mL Final   08/28/2018 0.3 (L) 0.9 - 6.9 ng/mL Final           Glucose   Date Value Ref Range Status   11/21/2019 222 (H) 70 - 99 mg/dL Final   07/12/2019 317 (H) 70 - 99 mg/dL Final   06/12/2019 92 70 - 99 mg/dL Final   06/11/2019 142 (H) 70 - 99 mg/dL Final   06/10/2019 219 (H) 70 - 99 mg/dL Final        Hemoglobin A1c        Lab Results   Component Value Date     A1C 6.7 11/21/2019     A1C 8.2 06/11/2019     A1C Canceled, Test credited 06/10/2019     A1C 9.6 04/18/2019     A1C 6.6 08/27/2018         Liver enzymes        Lab Results   Component Value Date     AST 64 07/12/2019            Lab Results   Component Value Date     ALT 48 07/12/2019            Lab Results   Component Value Date     BILICONJ 0.0 05/20/2014            Lab Results   Component Value Date     BILITOTAL 0.2 07/12/2019      Lab Results   Component Value Date     ALBUMIN 3.8 07/12/2019            Lab Results   Component Value Date     PROTTOTAL 7.4 07/12/2019            Lab Results   Component Value Date     ALKPHOS 117 07/12/2019         Creatinine:        Creatinine   Date Value Ref Range Status   11/21/2019 0.76 0.52 - 1.04 mg/dL Final   ]     Complete Blood Count:  CBC RESULTS:   Recent Labs   Lab Test 11/21/19  1000   WBC 6.2   RBC 3.94   HGB 11.6*   HCT 35.5   MCV 90   MCH 29.4   MCHC 32.7   RDW 14.8            Cholesterol        Lab Results   Component Value Date     CHOL 168 07/12/2019            Lab Results   Component Value Date     HDL 83 07/12/2019            Lab Results   Component Value Date     LDL 71 07/12/2019            Lab Results    Component Value Date     TRIG 73 07/12/2019            Lab Results   Component Value Date     CHOLHDLRKARINAO 3.2 01/08/2015               C-Dnyeyxw33/1/2017  [facility]   Component Name Value Ref Range   C-PEPTIDE  <0.1 (L)   Comment:    Test Performed by:  [facility] [address]  [address] , [place] [address] 1.1 - 4.4 ng/mL    Specimen     Blood        Simultaneous glucose 12/1/17 = 81 mg/dL        Assessment:  1. 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  Treated for central adrenal insufficiency.           Chantell is a 54 year old with history of chronic pancreatitis who is s/p total pancreatectomy and islet autotransplant, with insulin dependence (pump therapy) and partial islet graft function, complicated by insulin resistance, and several episodes of severe hypoglycemia and seizures (E10.641).  She is treated with a continuous subcutaneous insulin infusion pump.  We had to stop this temporarily due to a pump failure.  Now w 670g, waiting to be trained to start.     Chantell has post-pancreatectomy diabetes-- essentially a surgical form of type 1 diabetes (E10.641).  She requires an insulin pump for management due to her multiple episodes of hypoglycemia and hypoglycemia unawareness, including episodes of seizures that required suspension of insulin pump and hospital admits. She also should have a continuous glucose monitor for frequent monitoring because of her severe hypoglycemia history.  Chantell continues to wear a CGM, and needs to be on CGM or test 4 times per day, and we documented during the clinic encounter today use of CGM from review of her logs.  Chantell requires frequent insulin adjustments to her insulin regimen based on her blood glucoses to control hyperglycemia and hypoglycemia.         At today's visit, Chantell is on the 670G pump.  She has had no further hypoglycemia.  She has quite a bit of  hyperglycemia recently, unclear to me exactly what she has been dosing as I do not have download. We made a change to I:C.      Decided not to make any changes today to cortef.     Discussed other non diabetes issues as noted below today.           Plan:  1)  Change Insulin to Carb ratio from 60 grams to 40 grams.  Continue to bolus for all meals.    2)  Need to upload pump to [facility].  Let me know when this is done and [facility] is connected to clinic.    3)  Would like to move to automode.  With partial islet function I think you would do very well with this.  However, we need to first optimize your pump settings before transitioning.  We can do this virtually by using [facility].    4)  Keep cortef at 10 mg BID-- would eventually like to wean but given history of hypoglycemia, would wait until covid risk is lower (in case you were to end up at an ED or hospital).       Follow up: as above     Contact me for questions at [phone] or [phone].  Emergency number to reach pediatric endocrinology after hours is [phone].            Phone call duration: 22 minutes      Amena Maher MD  , Pediatric Endocrinology and Diabetes  [facility]

## 2020-08-12 ENCOUNTER — TELEPHONE (OUTPATIENT)
Dept: TRANSPLANT | Facility: CLINIC | Age: 55
End: 2020-08-12

## 2020-08-12 NOTE — TELEPHONE ENCOUNTER
ANDRAE for Chantell this morning asking her to ibis /email me with her [employer] access codes / I spoke to her several times yesterday after receiving a refill request for her pump supplies.She needs a visit with Dr Maher to complete this.I was able to get her scheduled but am waiting to get her codes so Dr Maher can review her data.

## 2020-08-14 DIAGNOSIS — E89.1 POST-PANCREATECTOMY DIABETES (H): ICD-10-CM

## 2020-08-14 DIAGNOSIS — E13.9 POST-PANCREATECTOMY DIABETES (H): ICD-10-CM

## 2020-08-14 DIAGNOSIS — E16.2 HYPOGLYCEMIA: Primary | ICD-10-CM

## 2020-08-14 DIAGNOSIS — Z90.410 POST-PANCREATECTOMY DIABETES (H): ICD-10-CM

## 2020-08-14 DIAGNOSIS — K86.89 PANCREATIC INSUFFICIENCY: ICD-10-CM

## 2020-08-18 ENCOUNTER — TELEPHONE (OUTPATIENT)
Dept: TRANSPLANT | Facility: CLINIC | Age: 55
End: 2020-08-18

## 2020-08-18 NOTE — TELEPHONE ENCOUNTER
Several phone calls back and forth with Chantell over the last few days.She stated her son was going to help her upload the pump, then that she had been in touch with [employer] for help.She says she is getting labs today at [facility] but no appt is in the system If she does go then Libby Jay from the endocrinology clinic will download her pump while she is there. I can not get hold of Chantell to tell her this so have left her an extensive message with Libby's phone number.I also said that if we did not get this data ahead of her clinic visit then we may have to cancel it and we would be unable to supply the documentation to reorder her supplies. I asked her to call me back to verify she is able to go to [facility] this afternoon for labs and pump download.

## 2020-08-28 ENCOUNTER — TELEPHONE (OUTPATIENT)
Dept: TRANSPLANT | Facility: CLINIC | Age: 55
End: 2020-08-28

## 2020-08-28 NOTE — TELEPHONE ENCOUNTER
Over the last 3 days I have sent a letter , text,My chart message and left voice message asking Chantell to contact me to confirm her appointment with Dr Maher next week. She also needs to get scheduled labs and get her Insulin pump downloaded . If she is unable to do this the I will cancel her appointment on Monday if I have not heard from her.

## 2020-11-08 ENCOUNTER — APPOINTMENT (OUTPATIENT)
Dept: CT IMAGING | Facility: CLINIC | Age: 55
End: 2020-11-08
Attending: EMERGENCY MEDICINE
Payer: COMMERCIAL

## 2020-11-08 ENCOUNTER — APPOINTMENT (OUTPATIENT)
Dept: GENERAL RADIOLOGY | Facility: CLINIC | Age: 55
End: 2020-11-08
Attending: EMERGENCY MEDICINE
Payer: COMMERCIAL

## 2020-11-08 ENCOUNTER — NURSE TRIAGE (OUTPATIENT)
Dept: NURSING | Facility: CLINIC | Age: 55
End: 2020-11-08

## 2020-11-08 ENCOUNTER — HOSPITAL ENCOUNTER (INPATIENT)
Facility: CLINIC | Age: 55
LOS: 4 days | Discharge: HOME | End: 2020-11-12
Attending: EMERGENCY MEDICINE | Admitting: INTERNAL MEDICINE
Payer: COMMERCIAL

## 2020-11-08 DIAGNOSIS — E87.6 HYPOKALEMIA: ICD-10-CM

## 2020-11-08 DIAGNOSIS — M81.0 OSTEOPOROSIS: ICD-10-CM

## 2020-11-08 DIAGNOSIS — R52 PAIN: Primary | ICD-10-CM

## 2020-11-08 DIAGNOSIS — K92.2 GASTROINTESTINAL HEMORRHAGE, UNSPECIFIED GASTROINTESTINAL HEMORRHAGE TYPE: ICD-10-CM

## 2020-11-08 DIAGNOSIS — U07.1 LAB TEST POSITIVE FOR DETECTION OF COVID-19 VIRUS: ICD-10-CM

## 2020-11-08 DIAGNOSIS — M81.0 AGE-RELATED OSTEOPOROSIS WITHOUT CURRENT PATHOLOGICAL FRACTURE: ICD-10-CM

## 2020-11-08 LAB
ABO + RH BLD: NORMAL
ABO + RH BLD: NORMAL
ALBUMIN SERPL-MCNC: 3.7 G/DL (ref 3.4–5)
ALP SERPL-CCNC: 101 U/L (ref 40–150)
ALT SERPL W P-5'-P-CCNC: 66 U/L (ref 0–50)
ANION GAP SERPL CALCULATED.3IONS-SCNC: 7 MMOL/L (ref 3–14)
APTT PPP: 25 SEC (ref 22–37)
AST SERPL W P-5'-P-CCNC: 58 U/L (ref 0–45)
BASOPHILS # BLD AUTO: 0.1 10E9/L (ref 0–0.2)
BASOPHILS # BLD AUTO: 0.1 10E9/L (ref 0–0.2)
BASOPHILS NFR BLD AUTO: 0.7 %
BASOPHILS NFR BLD AUTO: 0.7 %
BILIRUB SERPL-MCNC: 0.2 MG/DL (ref 0.2–1.3)
BLD GP AB SCN SERPL QL: NORMAL
BLOOD BANK CMNT PATIENT-IMP: NORMAL
BUN SERPL-MCNC: 9 MG/DL (ref 7–30)
CALCIUM SERPL-MCNC: 9.1 MG/DL (ref 8.5–10.1)
CHLORIDE SERPL-SCNC: 108 MMOL/L (ref 94–109)
CO2 SERPL-SCNC: 26 MMOL/L (ref 20–32)
CREAT SERPL-MCNC: 0.66 MG/DL (ref 0.52–1.04)
DIFFERENTIAL METHOD BLD: ABNORMAL
DIFFERENTIAL METHOD BLD: ABNORMAL
EOSINOPHIL # BLD AUTO: 0 10E9/L (ref 0–0.7)
EOSINOPHIL # BLD AUTO: 0.1 10E9/L (ref 0–0.7)
EOSINOPHIL NFR BLD AUTO: 0.4 %
EOSINOPHIL NFR BLD AUTO: 0.7 %
ERYTHROCYTE [DISTWIDTH] IN BLOOD BY AUTOMATED COUNT: 13.4 % (ref 10–15)
ERYTHROCYTE [DISTWIDTH] IN BLOOD BY AUTOMATED COUNT: 13.4 % (ref 10–15)
GFR SERPL CREATININE-BSD FRML MDRD: >90 ML/MIN/{1.73_M2}
GLUCOSE SERPL-MCNC: 194 MG/DL (ref 70–99)
HCT VFR BLD AUTO: 26.3 % (ref 35–47)
HCT VFR BLD AUTO: 31.5 % (ref 35–47)
HGB BLD-MCNC: 10.1 G/DL (ref 11.7–15.7)
HGB BLD-MCNC: 8.7 G/DL (ref 11.7–15.7)
IMM GRANULOCYTES # BLD: 0 10E9/L (ref 0–0.4)
IMM GRANULOCYTES # BLD: 0.1 10E9/L (ref 0–0.4)
IMM GRANULOCYTES NFR BLD: 0.4 %
IMM GRANULOCYTES NFR BLD: 0.7 %
INR PPP: 0.89 (ref 0.86–1.14)
LABORATORY COMMENT REPORT: ABNORMAL
LACTATE BLD-SCNC: 1.7 MMOL/L (ref 0.7–2)
LYMPHOCYTES # BLD AUTO: 2.5 10E9/L (ref 0.8–5.3)
LYMPHOCYTES # BLD AUTO: 2.9 10E9/L (ref 0.8–5.3)
LYMPHOCYTES NFR BLD AUTO: 29.4 %
LYMPHOCYTES NFR BLD AUTO: 38.4 %
MCH RBC QN AUTO: 29.6 PG (ref 26.5–33)
MCH RBC QN AUTO: 30.2 PG (ref 26.5–33)
MCHC RBC AUTO-ENTMCNC: 32.1 G/DL (ref 31.5–36.5)
MCHC RBC AUTO-ENTMCNC: 33.1 G/DL (ref 31.5–36.5)
MCV RBC AUTO: 91 FL (ref 78–100)
MCV RBC AUTO: 92 FL (ref 78–100)
MONOCYTES # BLD AUTO: 0.5 10E9/L (ref 0–1.3)
MONOCYTES # BLD AUTO: 0.5 10E9/L (ref 0–1.3)
MONOCYTES NFR BLD AUTO: 6.3 %
MONOCYTES NFR BLD AUTO: 6.3 %
NEUTROPHILS # BLD AUTO: 4.1 10E9/L (ref 1.6–8.3)
NEUTROPHILS # BLD AUTO: 5.2 10E9/L (ref 1.6–8.3)
NEUTROPHILS NFR BLD AUTO: 53.5 %
NEUTROPHILS NFR BLD AUTO: 62.5 %
NRBC # BLD AUTO: 0 10*3/UL
NRBC # BLD AUTO: 0 10*3/UL
NRBC BLD AUTO-RTO: 0 /100
NRBC BLD AUTO-RTO: 0 /100
NT-PROBNP SERPL-MCNC: 132 PG/ML (ref 0–900)
PLATELET # BLD AUTO: 277 10E9/L (ref 150–450)
PLATELET # BLD AUTO: 328 10E9/L (ref 150–450)
POTASSIUM SERPL-SCNC: 3.7 MMOL/L (ref 3.4–5.3)
PROT SERPL-MCNC: 7.1 G/DL (ref 6.8–8.8)
RBC # BLD AUTO: 2.88 10E12/L (ref 3.8–5.2)
RBC # BLD AUTO: 3.41 10E12/L (ref 3.8–5.2)
SARS-COV-2 RNA SPEC QL NAA+PROBE: NORMAL
SARS-COV-2 RNA SPEC QL NAA+PROBE: POSITIVE
SODIUM SERPL-SCNC: 141 MMOL/L (ref 133–144)
SPECIMEN EXP DATE BLD: NORMAL
SPECIMEN SOURCE: ABNORMAL
SPECIMEN SOURCE: NORMAL
WBC # BLD AUTO: 7.6 10E9/L (ref 4–11)
WBC # BLD AUTO: 8.4 10E9/L (ref 4–11)

## 2020-11-08 PROCEDURE — 86900 BLOOD TYPING SEROLOGIC ABO: CPT | Performed by: STUDENT IN AN ORGANIZED HEALTH CARE EDUCATION/TRAINING PROGRAM

## 2020-11-08 PROCEDURE — C9803 HOPD COVID-19 SPEC COLLECT: HCPCS | Performed by: EMERGENCY MEDICINE

## 2020-11-08 PROCEDURE — 99222 1ST HOSP IP/OBS MODERATE 55: CPT | Mod: AI | Performed by: INTERNAL MEDICINE

## 2020-11-08 PROCEDURE — 85610 PROTHROMBIN TIME: CPT | Performed by: EMERGENCY MEDICINE

## 2020-11-08 PROCEDURE — 86850 RBC ANTIBODY SCREEN: CPT | Performed by: STUDENT IN AN ORGANIZED HEALTH CARE EDUCATION/TRAINING PROGRAM

## 2020-11-08 PROCEDURE — 85730 THROMBOPLASTIN TIME PARTIAL: CPT | Performed by: EMERGENCY MEDICINE

## 2020-11-08 PROCEDURE — 85025 COMPLETE CBC W/AUTO DIFF WBC: CPT | Performed by: EMERGENCY MEDICINE

## 2020-11-08 PROCEDURE — 71045 X-RAY EXAM CHEST 1 VIEW: CPT | Mod: 26

## 2020-11-08 PROCEDURE — 83605 ASSAY OF LACTIC ACID: CPT | Performed by: EMERGENCY MEDICINE

## 2020-11-08 PROCEDURE — 120N000002 HC R&B MED SURG/OB UMMC

## 2020-11-08 PROCEDURE — 250N000013 HC RX MED GY IP 250 OP 250 PS 637: Performed by: STUDENT IN AN ORGANIZED HEALTH CARE EDUCATION/TRAINING PROGRAM

## 2020-11-08 PROCEDURE — 96375 TX/PRO/DX INJ NEW DRUG ADDON: CPT | Performed by: EMERGENCY MEDICINE

## 2020-11-08 PROCEDURE — 99285 EMERGENCY DEPT VISIT HI MDM: CPT | Mod: 25 | Performed by: EMERGENCY MEDICINE

## 2020-11-08 PROCEDURE — 80053 COMPREHEN METABOLIC PANEL: CPT | Performed by: EMERGENCY MEDICINE

## 2020-11-08 PROCEDURE — 71045 X-RAY EXAM CHEST 1 VIEW: CPT

## 2020-11-08 PROCEDURE — C9113 INJ PANTOPRAZOLE SODIUM, VIA: HCPCS | Performed by: EMERGENCY MEDICINE

## 2020-11-08 PROCEDURE — 74177 CT ABD & PELVIS W/CONTRAST: CPT | Mod: 26 | Performed by: RADIOLOGY

## 2020-11-08 PROCEDURE — 96374 THER/PROPH/DIAG INJ IV PUSH: CPT | Mod: 59 | Performed by: EMERGENCY MEDICINE

## 2020-11-08 PROCEDURE — 93005 ELECTROCARDIOGRAM TRACING: CPT | Performed by: EMERGENCY MEDICINE

## 2020-11-08 PROCEDURE — 96376 TX/PRO/DX INJ SAME DRUG ADON: CPT | Performed by: EMERGENCY MEDICINE

## 2020-11-08 PROCEDURE — 74177 CT ABD & PELVIS W/CONTRAST: CPT

## 2020-11-08 PROCEDURE — U0003 INFECTIOUS AGENT DETECTION BY NUCLEIC ACID (DNA OR RNA); SEVERE ACUTE RESPIRATORY SYNDROME CORONAVIRUS 2 (SARS-COV-2) (CORONAVIRUS DISEASE [COVID-19]), AMPLIFIED PROBE TECHNIQUE, MAKING USE OF HIGH THROUGHPUT TECHNOLOGIES AS DESCRIBED BY CMS-2020-01-R: HCPCS | Performed by: EMERGENCY MEDICINE

## 2020-11-08 PROCEDURE — 250N000011 HC RX IP 250 OP 636: Performed by: STUDENT IN AN ORGANIZED HEALTH CARE EDUCATION/TRAINING PROGRAM

## 2020-11-08 PROCEDURE — 86901 BLOOD TYPING SEROLOGIC RH(D): CPT | Performed by: STUDENT IN AN ORGANIZED HEALTH CARE EDUCATION/TRAINING PROGRAM

## 2020-11-08 PROCEDURE — 250N000011 HC RX IP 250 OP 636: Performed by: EMERGENCY MEDICINE

## 2020-11-08 PROCEDURE — 83880 ASSAY OF NATRIURETIC PEPTIDE: CPT | Performed by: EMERGENCY MEDICINE

## 2020-11-08 PROCEDURE — 93010 ELECTROCARDIOGRAM REPORT: CPT | Performed by: EMERGENCY MEDICINE

## 2020-11-08 RX ORDER — HYDROMORPHONE HCL IN WATER/PF 6 MG/30 ML
.2-.4 PATIENT CONTROLLED ANALGESIA SYRINGE INTRAVENOUS
Status: DISCONTINUED | OUTPATIENT
Start: 2020-11-08 | End: 2020-11-11

## 2020-11-08 RX ORDER — METOCLOPRAMIDE 10 MG/1
10 TABLET ORAL 3 TIMES DAILY
Status: DISCONTINUED | OUTPATIENT
Start: 2020-11-09 | End: 2020-11-12 | Stop reason: HOSPADM

## 2020-11-08 RX ORDER — LIDOCAINE 40 MG/G
CREAM TOPICAL
Status: DISCONTINUED | OUTPATIENT
Start: 2020-11-08 | End: 2020-11-12 | Stop reason: HOSPADM

## 2020-11-08 RX ORDER — TOPIRAMATE 50 MG/1
100 TABLET, FILM COATED ORAL 2 TIMES DAILY
Status: DISCONTINUED | OUTPATIENT
Start: 2020-11-09 | End: 2020-11-12 | Stop reason: HOSPADM

## 2020-11-08 RX ORDER — IOPAMIDOL 755 MG/ML
93 INJECTION, SOLUTION INTRAVASCULAR ONCE
Status: COMPLETED | OUTPATIENT
Start: 2020-11-08 | End: 2020-11-08

## 2020-11-08 RX ORDER — AMOXICILLIN 250 MG
1 CAPSULE ORAL 2 TIMES DAILY
Status: DISCONTINUED | OUTPATIENT
Start: 2020-11-09 | End: 2020-11-09

## 2020-11-08 RX ORDER — LEVOTHYROXINE SODIUM 112 UG/1
112 TABLET ORAL DAILY
Status: DISCONTINUED | OUTPATIENT
Start: 2020-11-09 | End: 2020-11-12 | Stop reason: HOSPADM

## 2020-11-08 RX ORDER — DROPERIDOL 2.5 MG/ML
2.5 INJECTION, SOLUTION INTRAMUSCULAR; INTRAVENOUS ONCE
Status: COMPLETED | OUTPATIENT
Start: 2020-11-08 | End: 2020-11-08

## 2020-11-08 RX ORDER — SODIUM CHLORIDE, SODIUM LACTATE, POTASSIUM CHLORIDE, CALCIUM CHLORIDE 600; 310; 30; 20 MG/100ML; MG/100ML; MG/100ML; MG/100ML
INJECTION, SOLUTION INTRAVENOUS CONTINUOUS
Status: DISCONTINUED | OUTPATIENT
Start: 2020-11-09 | End: 2020-11-11

## 2020-11-08 RX ORDER — NICOTINE POLACRILEX 4 MG
15-30 LOZENGE BUCCAL
Status: DISCONTINUED | OUTPATIENT
Start: 2020-11-08 | End: 2020-11-12 | Stop reason: HOSPADM

## 2020-11-08 RX ORDER — OXYCODONE HYDROCHLORIDE 5 MG/1
5-10 TABLET ORAL EVERY 4 HOURS PRN
Status: DISCONTINUED | OUTPATIENT
Start: 2020-11-08 | End: 2020-11-10

## 2020-11-08 RX ORDER — DEXTROSE MONOHYDRATE 25 G/50ML
25-50 INJECTION, SOLUTION INTRAVENOUS
Status: DISCONTINUED | OUTPATIENT
Start: 2020-11-08 | End: 2020-11-12 | Stop reason: HOSPADM

## 2020-11-08 RX ORDER — LIDOCAINE 4 G/G
1 PATCH TOPICAL
Status: DISCONTINUED | OUTPATIENT
Start: 2020-11-09 | End: 2020-11-12 | Stop reason: HOSPADM

## 2020-11-08 RX ORDER — HYDROCORTISONE 10 MG/1
TABLET ORAL
COMMUNITY

## 2020-11-08 RX ORDER — CYCLOBENZAPRINE HCL 10 MG
10 TABLET ORAL 3 TIMES DAILY PRN
COMMUNITY

## 2020-11-08 RX ORDER — DULOXETIN HYDROCHLORIDE 30 MG/1
90 CAPSULE, DELAYED RELEASE ORAL DAILY
Status: DISCONTINUED | OUTPATIENT
Start: 2020-11-09 | End: 2020-11-12 | Stop reason: HOSPADM

## 2020-11-08 RX ORDER — AMOXICILLIN 250 MG
2 CAPSULE ORAL 2 TIMES DAILY
Status: DISCONTINUED | OUTPATIENT
Start: 2020-11-09 | End: 2020-11-09

## 2020-11-08 RX ORDER — POLYETHYLENE GLYCOL 3350 17 G/17G
17 POWDER, FOR SOLUTION ORAL DAILY
Status: DISCONTINUED | OUTPATIENT
Start: 2020-11-09 | End: 2020-11-09

## 2020-11-08 RX ORDER — HYDROCORTISONE 5 MG/1
5 TABLET ORAL AT BEDTIME
COMMUNITY

## 2020-11-08 RX ORDER — TRAZODONE HYDROCHLORIDE 100 MG/1
100-200 TABLET ORAL
Status: DISCONTINUED | OUTPATIENT
Start: 2020-11-08 | End: 2020-11-12 | Stop reason: HOSPADM

## 2020-11-08 RX ADMIN — HYDROMORPHONE HYDROCHLORIDE 0.4 MG: 0.2 INJECTION, SOLUTION INTRAMUSCULAR; INTRAVENOUS; SUBCUTANEOUS at 23:08

## 2020-11-08 RX ADMIN — HYDROCORTISONE SODIUM SUCCINATE 15 MG: 100 INJECTION, POWDER, FOR SOLUTION INTRAMUSCULAR; INTRAVENOUS at 22:59

## 2020-11-08 RX ADMIN — DROPERIDOL 2.5 MG: 2.5 INJECTION, SOLUTION INTRAMUSCULAR; INTRAVENOUS at 15:17

## 2020-11-08 RX ADMIN — IOPAMIDOL 93 ML: 755 INJECTION, SOLUTION INTRAVENOUS at 15:24

## 2020-11-08 RX ADMIN — HYDROMORPHONE HYDROCHLORIDE 1 MG: 1 INJECTION, SOLUTION INTRAMUSCULAR; INTRAVENOUS; SUBCUTANEOUS at 17:28

## 2020-11-08 RX ADMIN — PANTOPRAZOLE SODIUM 80 MG: 40 INJECTION, POWDER, FOR SOLUTION INTRAVENOUS at 18:47

## 2020-11-08 RX ADMIN — OXYCODONE HYDROCHLORIDE 10 MG: 5 TABLET ORAL at 20:21

## 2020-11-08 RX ADMIN — HYDROMORPHONE HYDROCHLORIDE 1 MG: 1 INJECTION, SOLUTION INTRAMUSCULAR; INTRAVENOUS; SUBCUTANEOUS at 15:49

## 2020-11-08 ASSESSMENT — ENCOUNTER SYMPTOMS
COUGH: 1
ANAL BLEEDING: 1
VOMITING: 1
ABDOMINAL PAIN: 1
BLOOD IN STOOL: 1
NAUSEA: 1
RHINORRHEA: 1

## 2020-11-08 ASSESSMENT — MIFFLIN-ST. JEOR
SCORE: 1240.88
SCORE: 1218.25

## 2020-11-08 NOTE — TELEPHONE ENCOUNTER
Called about worsened chest pain and difficulty breathing.  States she has been having the chest pain x 3 days ago.  Caller rated her chest pain at the time of this call to be 10/10 and constant.  Advised to call 911 now.  Shelby Ortega RN  COVID 19 Nurse Triage Plan/Patient Instructions    Please be aware that novel coronavirus (COVID-19) may be circulating in the community. If you develop symptoms such as fever, cough, or SOB or if you have concerns about the presence of another infection including coronavirus (COVID-19), please contact your health care provider or visit [email or web address].     Disposition/Instructions    Call to EMS/911 recommended. Follow protocol based instructions.     Bring Your Own Device:  Please also bring your smart device(s) (smart phones, tablets, laptops) and their charging cables for your personal use and to communicate with your care team during your visit.    Thank you for taking steps to prevent the spread of this virus.  o Limit your contact with others.  o Wear a simple mask to cover your cough.  o Wash your hands well and often.    Resources    [facility]: About COVID-19: [email or web address]    CDC: What to Do If You're Sick: www.cdc.gov/coronavirus/2019-ncov/about/steps-when-sick.html    CDC: Ending Home Isolation: www.cdc.gov/coronavirus/2019-ncov/hcp/disposition-in-home-patients.html     CDC: Caring for Someone: www.cdc.gov/coronavirus/2019-ncov/if-you-are-sick/care-for-someone.html     [facility]: Interim Guidance for Hospital Discharge to Home: www.health.[facility].[place]/diseases/coronavirus/hcp/hospdischarge.pdf    [facility] clinical trials (COVID-19 research studies): [email or web address]-trials     Below are the COVID-19 hotlines at the [place] Department of [facility] ([facility]). Interpreters are available.   o For health questions: Call [phone] or 1-622.569.9832 (7 a.m. to 7 p.m.)  o For questions about schools and childcare: Call  [phone] or 1-724.436.4275 (7 a.m. to 7 p.m.)                     Reason for Disposition    [1] Chest pain lasts > 5 minutes AND [2] age > 50    Additional Information    Negative: [1] Breathing stopped AND [2] hasn't returned    Negative: Choking on something    Negative: Severe difficulty breathing (e.g., struggling for each breath, speaks in single words)    Negative: Bluish (or gray) lips or face now    Negative: Difficult to awaken or acting confused (e.g., disoriented, slurred speech)    Negative: Passed out (i.e., lost consciousness, collapsed and was not responding)    Negative: Wheezing started suddenly after medicine, an allergic food or bee sting    Negative: Stridor    Negative: Slow, shallow and weak breathing    Negative: Sounds like a life-threatening emergency to the triager    Negative: Severe difficulty breathing (e.g., struggling for each breath, speaks in single words)    Negative: Difficult to awaken or acting confused (e.g., disoriented, slurred speech)    Negative: Shock suspected (e.g., cold/pale/clammy skin, too weak to stand, low BP, rapid pulse)    Negative: [1] Chest pain lasts > 5 minutes AND [2] history of heart disease  (i.e., heart attack, bypass surgery, angina, angioplasty, CHF; not just a heart murmur)    Negative: [1] Chest pain lasts > 5 minutes AND [2] described as crushing, pressure-like, or heavy    Chest pain    Protocols used: CHEST PAIN-A-AH, BREATHING DIFFICULTY-A-AH

## 2020-11-08 NOTE — ED PROVIDER NOTES
[place] EMERGENCY DEPARTMENT ([facility])  11/08/20    History     Chief Complaint   Patient presents with     Melena     N/V/D     History was provided by the patient and medical records.        Chantell Kidd is a 54 year old female chronic pancreatitis s/p total pancreatectomy and islet autotransplant (01/06/2012) and DM I who presents for evaluation due to nausea, vomiting and melena.  Patient reports experiencing a migraine-like headache for the past 40 years.  Today, she passed several bowel movements in which she noticed blood on the toilet paper after wiping and blood in the toilet.  She also notes diffuse abdominal pain.  She states this pain feels similar to her pain following a procedure several years ago.  Patient reports several episodes of emesis along with nausea.  She has had a mild, intermittent cough with rhinorrhea.  She denies chest pain or shortness of breath but does have some mild chest pressure.    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    TPIAT     Spasm of sphincter of Oddi        Past Surgical History:   Procedure Laterality Date     ARTHROPLASTY CARPOMETACARPAL (THUMB JOINT)  5/2/2014    Procedure: ARTHROPLASTY CARPOMETACARPAL (THUMB JOINT);  Surgeon: Carina Panda MD;  Location: [facility] OR     CHOLECYSTECTOMY  2004     COLONOSCOPY  7/18/2014    Procedure: COLONOSCOPY;  Surgeon: Aurora Sahu MD;  Location:  GI     COLONOSCOPY N/A 8/1/2017    Procedure: COLONOSCOPY;  Colonoscopy and upper endoscopy;  Surgeon: Deirdre Harris MD;  Location:  GI     ENDOSCOPIC  RETROGRADE CHOLANGIOPANCREATOGRAM       ENDOSCOPIC RETROGRADE CHOLANGIOPANCREATOGRAM  4/19/2011    Procedure:ENDOSCOPIC RETROGRADE CHOLANGIOPANCREATOGRAM; Pancreatic Stent Placement       ENDOSCOPIC RETROGRADE CHOLANGIOPANCREATOGRAM  5/26/2011    Procedure:ENDOSCOPIC RETROGRADE CHOLANGIOPANCREATOGRAM; with Pancreatic Stent Removal; Surgeon:DALE MIMS; Location:[facility] OR     ENDOSCOPY UPPER, COLONOSCOPY, COMBINED  4/25/2012    Procedure:COMBINED ENDOSCOPY UPPER, COLONOSCOPY; Enteroscopy with Bile Duct Stent Removal, Colonoscopy  *Latex Safe Room*; Surgeon:GRACY GODWINIQ; Location:[facility]     ESOPHAGOSCOPY, GASTROSCOPY, DUODENOSCOPY (EGD), COMBINED  5/26/2011    Procedure:COMBINED ESOPHAGOSCOPY, GASTROSCOPY, DUODENOSCOPY (EGD); Surgeon:DALE MIMS; Location[facility]     ESOPHAGOSCOPY, GASTROSCOPY, DUODENOSCOPY (EGD), COMBINED N/A 10/30/2014    Procedure: COMBINED ESOPHAGOSCOPY, GASTROSCOPY, DUODENOSCOPY (EGD), BIOPSY SINGLE OR MULTIPLE;  Surgeon: Sarai Moon MD;  Location: [facility] GI     ESOPHAGOSCOPY, GASTROSCOPY, DUODENOSCOPY (EGD), COMBINED Left 7/6/2015    Procedure: COMBINED ESOPHAGOSCOPY, GASTROSCOPY, DUODENOSCOPY (EGD), BIOPSY SINGLE OR MULTIPLE;  Surgeon: Thomas Estrada MD;  Location: [facility]     ESOPHAGOSCOPY, GASTROSCOPY, DUODENOSCOPY (EGD), COMBINED N/A 7/8/2016    Procedure: COMBINED ESOPHAGOSCOPY, GASTROSCOPY, DUODENOSCOPY (EGD), BIOPSY SINGLE OR MULTIPLE;  Surgeon: Eloy Klein MD;  [facility]: [facility]     ESOPHAGOSCOPY, GASTROSCOPY, DUODENOSCOPY (EGD), COMBINED N/A 8/4/2016    Procedure: COMBINED ESOPHAGOSCOPY, GASTROSCOPY, DUODENOSCOPY (EGD), BIOPSY SINGLE OR MULTIPLE;  Surgeon: Jason Brown MD;  [facility]: [facility]     ESOPHAGOSCOPY, GASTROSCOPY, DUODENOSCOPY (EGD), COMBINED N/A 8/1/2017    Procedure: COMBINED ESOPHAGOSCOPY, GASTROSCOPY, DUODENOSCOPY (EGD);;  Surgeon: Deirdre Harris MD;  [facility]: [facility]     ESOPHAGOSCOPY, GASTROSCOPY,  DUODENOSCOPY (EGD), COMBINED N/A 6/12/2019    Procedure: ESOPHAGOGASTRODUODENOSCOPY (EGD);  Surgeon: Jose Francisco Perdomo MD;  Location: [facility]     GYN SURGERY      Hysterectomy and USO     [facility] ENDOSCOPY W EUS  7/20/2011    Procedure:COMBINED ENDOSCOPIC ULTRASOUND, ESOPHAGOSCOPY, GASTROSCOPY, DUODENOSCOPY (EGD); Surgeon:DARVIN DONOHUE; Location[facility]     HERNIORRHAPHY VENTRAL N/A 9/15/2016    Procedure: HERNIORRHAPHY VENTRAL;  Surgeon: Juanita Bernabe MD;  [facility]     HYSTERECTOMY  1997 or 1998    USO     INCISION AND DRAINAGE ABDOMEN WASHOUT, COMBINED  8/16/2012    Procedure: COMBINED INCISION AND DRAINAGE ABDOMEN WASHOUT;  ,debridement and Drainage Post Appendectomy;  Surgeon: Ron Austin MD;  [facility]     INJECT TRANSVERSUS ABDOMINIS PLANE (TAP) BLOCK BILATERAL Bilateral 5/26/2016    Procedure: INJECT TRANSVERSUS ABDOMINIS PLANE (TAP) BLOCK BILATERAL;  Surgeon: Leonard Mccallum MD;  [facility]     LAPAROSCOPIC APPENDECTOMY  7/30/2012    Procedure: LAPAROSCOPIC APPENDECTOMY;  Open Appendectomy;  Surgeon: Ron Austin MD;  [facility]     PANCREATECTOMY, TRANSPLANT AUTO ISLET CELL, COMBINED  1/6/2012    Procedure:COMBINED PANCREATECTOMY, TRANSPLANT AUTO ISLET CELL; Total  Pancreatectomy, Auto Islet Transplant, splenectomy, 18fr. transgastric-jejunal feeding tube placement, liver biopsy; Surgeon:PALAK LEE; Location[facility]     REPLACE GASTROSTOMY TUBE, PERCUTANEOUS N/A 8/30/2017    Procedure: REPLACE GASTROSTOMY TUBE, PERCUTANEOUS;  GJ Tube Change;  Surgeon: Jose Nath PA-C;  [facility]     SPLENECTOMY         Family History   Problem Relation Age of Onset     Hypertension Mother      Diabetes Mother      Osteoporosis Mother      Cancer Father         pancreatic cancer     Diabetes Maternal Grandmother      Cardiovascular Maternal Grandmother      Cancer Maternal Grandfather         lung cancer     Cancer Sister         brain      Cancer Sister         liver cancer       Social History     Tobacco Use     Smoking status: Never Smoker     Smokeless tobacco: Never Used   Substance Use Topics     Alcohol use: No     Alcohol/week: 0.0 standard drinks     No current facility-administered medications for this encounter.      Current Outpatient Medications   Medication     acetaminophen 500 MG CAPS     alendronate (FOSAMAX) 70 MG tablet     alum & mag hydroxide-simethicone (MYLANTA/MAALOX) 200-200-25 MG CHEW chewable tablet     amylase-lipase-protease (CREON 12) 56278 units CPEP     aspirin 81 MG tablet     blood glucose (NO BRAND SPECIFIED) test strip     blood glucose (NO BRAND SPECIFIED) test strip     cyclobenzaprine (FLEXERIL) 5 MG tablet     diclofenac (FLECTOR) 1.3 % Patch     diclofenac (VOLTAREN) 1 % GEL     dicyclomine (BENTYL) 10 MG capsule     dronabinol (MARINOL) 2.5 MG capsule     DULoxetine (CYMBALTA) 30 MG capsule     DULoxetine (CYMBALTA) 60 MG EC capsule     hydrocortisone (CORTEF) 10 MG tablet     hydrocortisone (CORTEF) 5 MG tablet     Injection Device for insulin (NOVOPEN ECHO) RICARDO     insulin aspart (NOVOLOG VIAL) 100 UNITS/ML vial     insulin aspart (NOVOPEN ECHO) 100 UNIT/ML cartridge     levothyroxine (SYNTHROID/LEVOTHROID) 112 MCG tablet     linaclotide (LINZESS) 145 MCG capsule     metoclopramide (REGLAN) 5 MG tablet     Nutritional Supplements (BOOST HIGH PROTEIN) LIQD     ondansetron (ZOFRAN-ODT) 4 MG ODT tab     order for DME     pantoprazole (PROTONIX) 40 MG EC tablet     polyethylene glycol (MIRALAX/GLYCOLAX) packet     potassium chloride SA (K-DUR/KLOR-CON M) 20 MEQ CR tablet     pregabalin (LYRICA) 150 MG capsule     senna-docusate (SENOKOT-S/PERICOLACE) 8.6-50 MG tablet     sodium chloride (OCEAN) 0.65 % nasal spray     sucralfate (CARAFATE) 1 GM tablet     SUMAtriptan (IMITREX) 50 MG tablet     topiramate (TOPAMAX) 100 MG tablet     traZODone (DESYREL) 100 MG tablet        Allergies   Allergen Reactions      "Corticosteroids Other (See Comments)     All oral, IV and injectable steroids are contraindicated (unless in life threatening situations) in Islet Auto transplant recipients. They can cause irreversible loss of islet cell function. Please contact patient's transplant care coordinator YURI Cortez RN at [phone]/pager [phone] and/or endocrinologist prior to administration.       Chocolate Flavor Rash     Breaks out when eats chocolate         Review of Systems   HENT: Positive for rhinorrhea.    Respiratory: Positive for cough (Intermittent).    Cardiovascular: Negative for chest pain.   Gastrointestinal: Positive for abdominal pain, anal bleeding, blood in stool, nausea and vomiting.   All other systems reviewed and are negative.    A complete review of systems was performed with pertinent positives and negatives noted in the HPI, and all other systems negative.    Physical Exam   BP: (!) 150/74  Pulse: 86  Temp: 98.5  F (36.9  C)  Resp: 12  Height: 157.5 cm (5' 2\")  Weight: 69.8 kg (153 lb 12.8 oz)  SpO2: 98 %  Physical Exam  Constitutional:       General: She is not in acute distress.     Appearance: She is not diaphoretic.   HENT:      Head: Atraumatic.      Mouth/Throat:      Pharynx: No oropharyngeal exudate.   Eyes:      General: No scleral icterus.     Pupils: Pupils are equal, round, and reactive to light.   Cardiovascular:      Rate and Rhythm: Normal rate and regular rhythm.      Heart sounds: Normal heart sounds.   Pulmonary:      Effort: No respiratory distress.      Breath sounds: Normal breath sounds.   Abdominal:      General: Bowel sounds are normal.      Palpations: Abdomen is soft.      Tenderness: There is abdominal tenderness.   Genitourinary:     Rectum: Normal. Guaiac result positive.   Musculoskeletal:         General: No tenderness.   Skin:     General: Skin is warm.      Capillary Refill: Capillary refill takes less than 2 seconds.      Findings: No rash.   Neurological:      " General: No focal deficit present.      Mental Status: She is oriented to person, place, and time.   Psychiatric:         Mood and Affect: Mood normal.         Behavior: Behavior normal.         ED Course     2:18 PM  The patient was seen and examined by Dr. Tristan Nguyen in Room ED 14.     Procedures             EKG Interpretation:      Interpreted by Wan Nguyen MD  Time reviewed:   Symptoms at time of EKG:    Rhythm: normal sinus   Rate: normal  Axis: normal  Ectopy: none  Conduction: normal  ST Segments/ T Waves: No ST-T wave changes  Q Waves: none  Comparison to prior: Unchanged    Clinical Impression: normal EKG                      Results for orders placed or performed during the hospital encounter of 11/08/20   XR Chest Port 1 View     Status: None    Narrative    XR CHEST PORT 1 VW  11/8/2020 2:25 PM      HISTORY: dyspnea    COMPARISON: Chest radiograph 9/3/2018    FINDINGS: AP view of the chest. Cardiac silhouette is within normal  limits. No acute airspace opacity. No pleural effusion or  pneumothorax. Cholecystectomy clips and surgical clips over the  midline upper abdomen no acute osseous abnormality.      Impression    IMPRESSION: No acute cardiopulmonary abnormality.    I have personally reviewed the examination and initial interpretation  and I agree with the findings.    KEISHA CHAUDHRY MD   CT Abdomen Pelvis w Contrast     Status: None    Narrative    EXAMINATION: CT ABDOMEN PELVIS W CONTRAST, 11/8/2020 3:36 PM    TECHNIQUE:  Helical CT images from the lung bases through the  symphysis pubis were obtained with IV contrast. Contrast dose: Isovue  370    COMPARISON: Abdomen and pelvis CT 6/10/2020    HISTORY: Abd pain, acute, generalized; abd pain x 3 days w/ maroon  rectal bleeding x this AM    FINDINGS:    Lung bases: Bibasilar atelectasis.    Liver: Normal parenchymal attenuation without focal mass.  Biliary system: Cholecystectomy. Stable pneumobilia. Post surgical  changes of Venessa-en-Y  hepaticojejunostomy..  Pancreas: Post surgical changes of native pancreatectomy. Right lower  quadrant pancreatic transplant appears normal.  Stomach: Surgical changes of Venessa-en-Y gastric bypass. There is  enhancement of the distal small bowel near the anastomosis, similar to  prior.  Spleen: Splenectomy.  Adrenal glands: Within normal limits.  Kidneys: No focal mass, hydronephrosis, or stone.  Bladder: Distended otherwise within normal limits.  Reproductive organs: Postsurgical changes of hysterectomy.  Colon: There is increased colonic wall edema within the rectum,  sigmoid and descending colon. Diverticulosis without evidence of  diverticulitis.  Appendix: Appendix not definitively identified, but no secondary  findings of appendicitis.  Small Bowel: Within normal limits.  Lymph nodes: No intra-abdominal or pelvic lymphadenopathy.  Vasculature: Within normal limits.    Bones and soft tissues: No suspicious soft tissue mass or fluid  collection. No suspicious osseous lesion.      Impression    IMPRESSION:   1. Increased colonic wall thickening in the rectum, sigmoid and  descending colon, suggestive of colitis although, since the coon is  decompressed, this could be artifactual.  2. Stable extensive postsurgical changes in the abdomen. Stable  pneumobilia.    I have personally reviewed the examination and initial interpretation  and I agree with the findings.    BERONICA ARGUETA MD   Lactic acid whole blood     Status: None   Result Value Ref Range    Lactic Acid 1.7 0.7 - 2.0 mmol/L   Symptomatic COVID-19 Virus (Coronavirus) by PCR     Status: None    Specimen: Nasopharyngeal   Result Value Ref Range    COVID-19 Virus PCR to [facility] - Source Nasopharyngeal     COVID-19 Virus PCR to [facility] - Result       Test received-See reflex to IDDL test SARS CoV2 (COVID-19) Virus RT-PCR   CBC with platelets differential     Status: Abnormal   Result Value Ref Range    WBC 8.4 4.0 - 11.0 10e9/L    RBC Count 3.41 (L) 3.8 - 5.2  10e12/L    Hemoglobin 10.1 (L) 11.7 - 15.7 g/dL    Hematocrit 31.5 (L) 35.0 - 47.0 %    MCV 92 78 - 100 fl    MCH 29.6 26.5 - 33.0 pg    MCHC 32.1 31.5 - 36.5 g/dL    RDW 13.4 10.0 - 15.0 %    Platelet Count 328 150 - 450 10e9/L    Diff Method Automated Method     % Neutrophils 62.5 %    % Lymphocytes 29.4 %    % Monocytes 6.3 %    % Eosinophils 0.7 %    % Basophils 0.7 %    % Immature Granulocytes 0.4 %    Nucleated RBCs 0 0 /100    Absolute Neutrophil 5.2 1.6 - 8.3 10e9/L    Absolute Lymphocytes 2.5 0.8 - 5.3 10e9/L    Absolute Monocytes 0.5 0.0 - 1.3 10e9/L    Absolute Eosinophils 0.1 0.0 - 0.7 10e9/L    Absolute Basophils 0.1 0.0 - 0.2 10e9/L    Abs Immature Granulocytes 0.0 0 - 0.4 10e9/L    Absolute Nucleated RBC 0.0    Comprehensive metabolic panel     Status: Abnormal   Result Value Ref Range    Sodium 141 133 - 144 mmol/L    Potassium 3.7 3.4 - 5.3 mmol/L    Chloride 108 94 - 109 mmol/L    Carbon Dioxide 26 20 - 32 mmol/L    Anion Gap 7 3 - 14 mmol/L    Glucose 194 (H) 70 - 99 mg/dL    Urea Nitrogen 9 7 - 30 mg/dL    Creatinine 0.66 0.52 - 1.04 mg/dL    GFR Estimate >90 >60 mL/min/[1.73_m2]    GFR Estimate If Black >90 >60 mL/min/[1.73_m2]    Calcium 9.1 8.5 - 10.1 mg/dL    Bilirubin Total 0.2 0.2 - 1.3 mg/dL    Albumin 3.7 3.4 - 5.0 g/dL    Protein Total 7.1 6.8 - 8.8 g/dL    Alkaline Phosphatase 101 40 - 150 U/L    ALT 66 (H) 0 - 50 U/L    AST 58 (H) 0 - 45 U/L   INR     Status: None   Result Value Ref Range    INR 0.89 0.86 - 1.14   Nt probnp inpatient     Status: None   Result Value Ref Range    N-Terminal Pro BNP Inpatient 132 0 - 900 pg/mL   Partial thromboplastin time     Status: None   Result Value Ref Range    PTT 25 22 - 37 sec   CBC with platelets differential     Status: Abnormal   Result Value Ref Range    WBC 7.6 4.0 - 11.0 10e9/L    RBC Count 2.88 (L) 3.8 - 5.2 10e12/L    Hemoglobin 8.7 (L) 11.7 - 15.7 g/dL    Hematocrit 26.3 (L) 35.0 - 47.0 %    MCV 91 78 - 100 fl    MCH 30.2 26.5 - 33.0 pg     MCHC 33.1 31.5 - 36.5 g/dL    RDW 13.4 10.0 - 15.0 %    Platelet Count 277 150 - 450 10e9/L    Diff Method Automated Method     % Neutrophils 53.5 %    % Lymphocytes 38.4 %    % Monocytes 6.3 %    % Eosinophils 0.4 %    % Basophils 0.7 %    % Immature Granulocytes 0.7 %    Nucleated RBCs 0 0 /100    Absolute Neutrophil 4.1 1.6 - 8.3 10e9/L    Absolute Lymphocytes 2.9 0.8 - 5.3 10e9/L    Absolute Monocytes 0.5 0.0 - 1.3 10e9/L    Absolute Eosinophils 0.0 0.0 - 0.7 10e9/L    Absolute Basophils 0.1 0.0 - 0.2 10e9/L    Abs Immature Granulocytes 0.1 0 - 0.4 10e9/L    Absolute Nucleated RBC 0.0    EKG 12 lead     Status: None (Preliminary result)   Result Value Ref Range    Interpretation ECG Click View Image link to view waveform and result      Medications   droperidol (INAPSINE) injection 2.5 mg (2.5 mg Intravenous Given 11/8/20 1517)   iopamidol (ISOVUE-370) solution 93 mL (93 mLs Intravenous Given 11/8/20 1524)   sodium chloride (PF) 0.9% PF flush 73 mL (73 mLs Intravenous Given 11/8/20 1525)   HYDROmorphone (DILAUDID) injection 1 mg (1 mg Intravenous Given 11/8/20 1549)   HYDROmorphone (DILAUDID) injection 1 mg (1 mg Intravenous Given 11/8/20 1728)        Assessments & Plan (with Medical Decision Making)     54-year-old female, here with abdominal pain and lower GI bleeding.  Vomit is without coffee grounds, given stool is maroon, I suspect this is a lower GI bleed.    Hemoglobin 11 months ago was 11.6, first 1 today 10.1, repeat at 3 hours was 8.7 so a total drop of about 3, therefore will admit patient as she has symptomatic anemia that may require transfusion and may continue to drop so warrants colonoscopy.    I have reviewed the nursing notes. I have reviewed the findings, diagnosis, plan and need for follow up with the patient.    New Prescriptions    No medications on file       Final diagnoses:   Gastrointestinal hemorrhage, unspecified gastrointestinal hemorrhage type     I, Chang Lomas, am serving as  a trained medical scribe to document services personally performed by Tristan Nguyen DO, based on the provider's statements to me.      I, Tristan Nguyen DO, was physically present and have reviewed and verified the accuracy of this note documented by Chang Lomas.   --  Tristan Nguyen DO  [facility] EMERGENCY DEPARTMENT  11/8/2020     Wan Nguyen MD  11/08/20 1738

## 2020-11-08 NOTE — ED TRIAGE NOTES
Pt arrives ambulatory with 3 day history of body aches/N/V/D with melena. Pt endorses headache. No fevers at home or during triage. Pt reports poor nutrition and fluid intake r/t nausea for the past 3 days.

## 2020-11-09 LAB
ALBUMIN SERPL-MCNC: 3 G/DL (ref 3.4–5)
ALBUMIN SERPL-MCNC: 3.2 G/DL (ref 3.4–5)
ALP SERPL-CCNC: 84 U/L (ref 40–150)
ALP SERPL-CCNC: 88 U/L (ref 40–150)
ALT SERPL W P-5'-P-CCNC: 47 U/L (ref 0–50)
ALT SERPL W P-5'-P-CCNC: 50 U/L (ref 0–50)
ANION GAP SERPL CALCULATED.3IONS-SCNC: 5 MMOL/L (ref 3–14)
ANION GAP SERPL CALCULATED.3IONS-SCNC: 8 MMOL/L (ref 3–14)
APTT PPP: 25 SEC (ref 22–37)
AST SERPL W P-5'-P-CCNC: 31 U/L (ref 0–45)
AST SERPL W P-5'-P-CCNC: 34 U/L (ref 0–45)
AT III ACT/NOR PPP CHRO: 103 % (ref 85–135)
BILIRUB SERPL-MCNC: 0.3 MG/DL (ref 0.2–1.3)
BILIRUB SERPL-MCNC: 0.4 MG/DL (ref 0.2–1.3)
BUN SERPL-MCNC: 5 MG/DL (ref 7–30)
BUN SERPL-MCNC: 5 MG/DL (ref 7–30)
CALCIUM SERPL-MCNC: 8.9 MG/DL (ref 8.5–10.1)
CALCIUM SERPL-MCNC: 8.9 MG/DL (ref 8.5–10.1)
CHLORIDE SERPL-SCNC: 109 MMOL/L (ref 94–109)
CHLORIDE SERPL-SCNC: 110 MMOL/L (ref 94–109)
CK SERPL-CCNC: 104 U/L (ref 30–225)
CO2 SERPL-SCNC: 25 MMOL/L (ref 20–32)
CO2 SERPL-SCNC: 26 MMOL/L (ref 20–32)
CREAT SERPL-MCNC: 0.75 MG/DL (ref 0.52–1.04)
CREAT SERPL-MCNC: 0.76 MG/DL (ref 0.52–1.04)
CRP SERPL-MCNC: <2.9 MG/L (ref 0–8)
D DIMER PPP FEU-MCNC: 0.4 UG/ML FEU (ref 0–0.5)
ERYTHROCYTE [DISTWIDTH] IN BLOOD BY AUTOMATED COUNT: 13.7 % (ref 10–15)
FERRITIN SERPL-MCNC: 28 NG/ML (ref 8–252)
FIBRINOGEN PPP-MCNC: 257 MG/DL (ref 200–420)
GFR SERPL CREATININE-BSD FRML MDRD: 87 ML/MIN/{1.73_M2}
GFR SERPL CREATININE-BSD FRML MDRD: 88 ML/MIN/{1.73_M2}
GLUCOSE BLDC GLUCOMTR-MCNC: 111 MG/DL (ref 70–99)
GLUCOSE BLDC GLUCOMTR-MCNC: 143 MG/DL (ref 70–99)
GLUCOSE BLDC GLUCOMTR-MCNC: 172 MG/DL (ref 70–99)
GLUCOSE BLDC GLUCOMTR-MCNC: 174 MG/DL (ref 70–99)
GLUCOSE BLDC GLUCOMTR-MCNC: 178 MG/DL (ref 70–99)
GLUCOSE BLDC GLUCOMTR-MCNC: 179 MG/DL (ref 70–99)
GLUCOSE BLDC GLUCOMTR-MCNC: 193 MG/DL (ref 70–99)
GLUCOSE BLDC GLUCOMTR-MCNC: 50 MG/DL (ref 70–99)
GLUCOSE BLDC GLUCOMTR-MCNC: 93 MG/DL (ref 70–99)
GLUCOSE BLDC GLUCOMTR-MCNC: 93 MG/DL (ref 70–99)
GLUCOSE SERPL-MCNC: 100 MG/DL (ref 70–99)
GLUCOSE SERPL-MCNC: 128 MG/DL (ref 70–99)
HBA1C MFR BLD: 7.3 % (ref 0–5.6)
HCT VFR BLD AUTO: 27.3 % (ref 35–47)
HEMOCCULT STL QL IA: POSITIVE
HGB BLD-MCNC: 10 G/DL (ref 11.7–15.7)
HGB BLD-MCNC: 9.1 G/DL (ref 11.7–15.7)
HGB BLD-MCNC: 9.1 G/DL (ref 11.7–15.7)
HGB BLD-MCNC: 9.2 G/DL (ref 11.7–15.7)
HGB BLD-MCNC: 9.8 G/DL (ref 11.7–15.7)
IL6 SERPL-MCNC: 0.98 PG/ML
INR PPP: 1.05 (ref 0.86–1.14)
INTERPRETATION ECG - MUSE: NORMAL
LDH SERPL L TO P-CCNC: 205 U/L (ref 81–234)
MCH RBC QN AUTO: 30.7 PG (ref 26.5–33)
MCHC RBC AUTO-ENTMCNC: 33.3 G/DL (ref 31.5–36.5)
MCV RBC AUTO: 92 FL (ref 78–100)
PLATELET # BLD AUTO: 276 10E9/L (ref 150–450)
POTASSIUM SERPL-SCNC: 3.5 MMOL/L (ref 3.4–5.3)
POTASSIUM SERPL-SCNC: 3.6 MMOL/L (ref 3.4–5.3)
PROT SERPL-MCNC: 6.1 G/DL (ref 6.8–8.8)
PROT SERPL-MCNC: 6.3 G/DL (ref 6.8–8.8)
RBC # BLD AUTO: 2.96 10E12/L (ref 3.8–5.2)
RETICS # AUTO: 84.2 10E9/L (ref 25–95)
RETICS/RBC NFR AUTO: 2.7 % (ref 0.5–2)
SODIUM SERPL-SCNC: 141 MMOL/L (ref 133–144)
SODIUM SERPL-SCNC: 141 MMOL/L (ref 133–144)
TROPONIN I SERPL-MCNC: <0.015 UG/L (ref 0–0.04)
WBC # BLD AUTO: 10 10E9/L (ref 4–11)

## 2020-11-09 PROCEDURE — 82274 ASSAY TEST FOR BLOOD FECAL: CPT | Performed by: INTERNAL MEDICINE

## 2020-11-09 PROCEDURE — 82728 ASSAY OF FERRITIN: CPT | Performed by: STUDENT IN AN ORGANIZED HEALTH CARE EDUCATION/TRAINING PROGRAM

## 2020-11-09 PROCEDURE — 83520 IMMUNOASSAY QUANT NOS NONAB: CPT | Performed by: STUDENT IN AN ORGANIZED HEALTH CARE EDUCATION/TRAINING PROGRAM

## 2020-11-09 PROCEDURE — 83615 LACTATE (LD) (LDH) ENZYME: CPT | Performed by: STUDENT IN AN ORGANIZED HEALTH CARE EDUCATION/TRAINING PROGRAM

## 2020-11-09 PROCEDURE — 36415 COLL VENOUS BLD VENIPUNCTURE: CPT | Performed by: STUDENT IN AN ORGANIZED HEALTH CARE EDUCATION/TRAINING PROGRAM

## 2020-11-09 PROCEDURE — 250N000011 HC RX IP 250 OP 636: Performed by: STUDENT IN AN ORGANIZED HEALTH CARE EDUCATION/TRAINING PROGRAM

## 2020-11-09 PROCEDURE — 82550 ASSAY OF CK (CPK): CPT | Performed by: STUDENT IN AN ORGANIZED HEALTH CARE EDUCATION/TRAINING PROGRAM

## 2020-11-09 PROCEDURE — 85300 ANTITHROMBIN III ACTIVITY: CPT | Performed by: STUDENT IN AN ORGANIZED HEALTH CARE EDUCATION/TRAINING PROGRAM

## 2020-11-09 PROCEDURE — 80053 COMPREHEN METABOLIC PANEL: CPT | Performed by: STUDENT IN AN ORGANIZED HEALTH CARE EDUCATION/TRAINING PROGRAM

## 2020-11-09 PROCEDURE — 85045 AUTOMATED RETICULOCYTE COUNT: CPT | Performed by: STUDENT IN AN ORGANIZED HEALTH CARE EDUCATION/TRAINING PROGRAM

## 2020-11-09 PROCEDURE — 86140 C-REACTIVE PROTEIN: CPT | Performed by: STUDENT IN AN ORGANIZED HEALTH CARE EDUCATION/TRAINING PROGRAM

## 2020-11-09 PROCEDURE — 258N000003 HC RX IP 258 OP 636: Performed by: STUDENT IN AN ORGANIZED HEALTH CARE EDUCATION/TRAINING PROGRAM

## 2020-11-09 PROCEDURE — 99223 1ST HOSP IP/OBS HIGH 75: CPT | Performed by: NURSE PRACTITIONER

## 2020-11-09 PROCEDURE — 85379 FIBRIN DEGRADATION QUANT: CPT | Performed by: STUDENT IN AN ORGANIZED HEALTH CARE EDUCATION/TRAINING PROGRAM

## 2020-11-09 PROCEDURE — C9113 INJ PANTOPRAZOLE SODIUM, VIA: HCPCS | Performed by: STUDENT IN AN ORGANIZED HEALTH CARE EDUCATION/TRAINING PROGRAM

## 2020-11-09 PROCEDURE — 85018 HEMOGLOBIN: CPT | Performed by: STUDENT IN AN ORGANIZED HEALTH CARE EDUCATION/TRAINING PROGRAM

## 2020-11-09 PROCEDURE — 99207 PR CDG-MDM COMPONENT: MEETS MODERATE - DOWN CODED: CPT | Performed by: INTERNAL MEDICINE

## 2020-11-09 PROCEDURE — 85730 THROMBOPLASTIN TIME PARTIAL: CPT | Performed by: STUDENT IN AN ORGANIZED HEALTH CARE EDUCATION/TRAINING PROGRAM

## 2020-11-09 PROCEDURE — 258N000001 HC RX 258: Performed by: STUDENT IN AN ORGANIZED HEALTH CARE EDUCATION/TRAINING PROGRAM

## 2020-11-09 PROCEDURE — 83036 HEMOGLOBIN GLYCOSYLATED A1C: CPT | Performed by: STUDENT IN AN ORGANIZED HEALTH CARE EDUCATION/TRAINING PROGRAM

## 2020-11-09 PROCEDURE — 85027 COMPLETE CBC AUTOMATED: CPT | Performed by: STUDENT IN AN ORGANIZED HEALTH CARE EDUCATION/TRAINING PROGRAM

## 2020-11-09 PROCEDURE — 85384 FIBRINOGEN ACTIVITY: CPT | Performed by: STUDENT IN AN ORGANIZED HEALTH CARE EDUCATION/TRAINING PROGRAM

## 2020-11-09 PROCEDURE — 80048 BASIC METABOLIC PNL TOTAL CA: CPT | Performed by: STUDENT IN AN ORGANIZED HEALTH CARE EDUCATION/TRAINING PROGRAM

## 2020-11-09 PROCEDURE — 99223 1ST HOSP IP/OBS HIGH 75: CPT | Performed by: CLINICAL NURSE SPECIALIST

## 2020-11-09 PROCEDURE — 120N000002 HC R&B MED SURG/OB UMMC

## 2020-11-09 PROCEDURE — 999N001017 HC STATISTIC GLUCOSE BY METER IP

## 2020-11-09 PROCEDURE — 250N000013 HC RX MED GY IP 250 OP 250 PS 637: Performed by: STUDENT IN AN ORGANIZED HEALTH CARE EDUCATION/TRAINING PROGRAM

## 2020-11-09 PROCEDURE — 84484 ASSAY OF TROPONIN QUANT: CPT | Performed by: STUDENT IN AN ORGANIZED HEALTH CARE EDUCATION/TRAINING PROGRAM

## 2020-11-09 PROCEDURE — 85610 PROTHROMBIN TIME: CPT | Performed by: STUDENT IN AN ORGANIZED HEALTH CARE EDUCATION/TRAINING PROGRAM

## 2020-11-09 PROCEDURE — 99232 SBSQ HOSP IP/OBS MODERATE 35: CPT | Mod: GC | Performed by: INTERNAL MEDICINE

## 2020-11-09 RX ORDER — AMOXICILLIN 250 MG
2 CAPSULE ORAL 2 TIMES DAILY
Status: DISCONTINUED | OUTPATIENT
Start: 2020-11-09 | End: 2020-11-12 | Stop reason: HOSPADM

## 2020-11-09 RX ORDER — ACETAMINOPHEN 325 MG/1
325-650 TABLET ORAL EVERY 4 HOURS PRN
Status: DISCONTINUED | OUTPATIENT
Start: 2020-11-09 | End: 2020-11-10

## 2020-11-09 RX ORDER — AMOXICILLIN 250 MG
1 CAPSULE ORAL 2 TIMES DAILY
Status: DISCONTINUED | OUTPATIENT
Start: 2020-11-09 | End: 2020-11-12 | Stop reason: HOSPADM

## 2020-11-09 RX ORDER — POLYETHYLENE GLYCOL 3350 17 G/17G
17 POWDER, FOR SOLUTION ORAL DAILY
Status: DISCONTINUED | OUTPATIENT
Start: 2020-11-09 | End: 2020-11-12 | Stop reason: HOSPADM

## 2020-11-09 RX ADMIN — SODIUM CHLORIDE, POTASSIUM CHLORIDE, SODIUM LACTATE AND CALCIUM CHLORIDE: 600; 310; 30; 20 INJECTION, SOLUTION INTRAVENOUS at 09:08

## 2020-11-09 RX ADMIN — ACETAMINOPHEN 650 MG: 325 TABLET, FILM COATED ORAL at 13:20

## 2020-11-09 RX ADMIN — ACETAMINOPHEN 650 MG: 325 TABLET, FILM COATED ORAL at 08:03

## 2020-11-09 RX ADMIN — HYDROMORPHONE HYDROCHLORIDE 0.4 MG: 0.2 INJECTION, SOLUTION INTRAMUSCULAR; INTRAVENOUS; SUBCUTANEOUS at 08:45

## 2020-11-09 RX ADMIN — HYDROMORPHONE HYDROCHLORIDE 0.4 MG: 0.2 INJECTION, SOLUTION INTRAMUSCULAR; INTRAVENOUS; SUBCUTANEOUS at 05:43

## 2020-11-09 RX ADMIN — TOPIRAMATE 100 MG: 50 TABLET ORAL at 00:10

## 2020-11-09 RX ADMIN — LEVOTHYROXINE SODIUM 112 MCG: 0.11 TABLET ORAL at 08:00

## 2020-11-09 RX ADMIN — DULOXETINE HYDROCHLORIDE 90 MG: 30 CAPSULE, DELAYED RELEASE ORAL at 08:00

## 2020-11-09 RX ADMIN — SODIUM CHLORIDE 8 MG/HR: 9 INJECTION, SOLUTION INTRAVENOUS at 18:21

## 2020-11-09 RX ADMIN — TOPIRAMATE 100 MG: 50 TABLET ORAL at 20:11

## 2020-11-09 RX ADMIN — SODIUM CHLORIDE 8 MG/HR: 9 INJECTION, SOLUTION INTRAVENOUS at 09:08

## 2020-11-09 RX ADMIN — HYDROMORPHONE HYDROCHLORIDE 0.4 MG: 0.2 INJECTION, SOLUTION INTRAMUSCULAR; INTRAVENOUS; SUBCUTANEOUS at 02:14

## 2020-11-09 RX ADMIN — HYDROMORPHONE HYDROCHLORIDE 0.4 MG: 0.2 INJECTION, SOLUTION INTRAMUSCULAR; INTRAVENOUS; SUBCUTANEOUS at 12:00

## 2020-11-09 RX ADMIN — SODIUM CHLORIDE, POTASSIUM CHLORIDE, SODIUM LACTATE AND CALCIUM CHLORIDE: 600; 310; 30; 20 INJECTION, SOLUTION INTRAVENOUS at 18:21

## 2020-11-09 RX ADMIN — METOCLOPRAMIDE 10 MG: 10 TABLET ORAL at 13:20

## 2020-11-09 RX ADMIN — ACETAMINOPHEN 650 MG: 325 TABLET, FILM COATED ORAL at 20:10

## 2020-11-09 RX ADMIN — LIDOCAINE 1 PATCH: 560 PATCH PERCUTANEOUS; TOPICAL; TRANSDERMAL at 00:10

## 2020-11-09 RX ADMIN — TOPIRAMATE 100 MG: 50 TABLET ORAL at 10:12

## 2020-11-09 RX ADMIN — HYDROCORTISONE SODIUM SUCCINATE 10 MG: 100 INJECTION, POWDER, FOR SOLUTION INTRAMUSCULAR; INTRAVENOUS at 17:02

## 2020-11-09 RX ADMIN — ACETAMINOPHEN 650 MG: 325 TABLET, FILM COATED ORAL at 02:53

## 2020-11-09 RX ADMIN — DOCUSATE SODIUM 50 MG AND SENNOSIDES 8.6 MG 1 TABLET: 8.6; 5 TABLET, FILM COATED ORAL at 08:45

## 2020-11-09 RX ADMIN — DEXTROSE MONOHYDRATE 25 ML: 500 INJECTION PARENTERAL at 10:06

## 2020-11-09 RX ADMIN — HYDROMORPHONE HYDROCHLORIDE 0.4 MG: 0.2 INJECTION, SOLUTION INTRAMUSCULAR; INTRAVENOUS; SUBCUTANEOUS at 20:10

## 2020-11-09 RX ADMIN — SODIUM CHLORIDE, POTASSIUM CHLORIDE, SODIUM LACTATE AND CALCIUM CHLORIDE: 600; 310; 30; 20 INJECTION, SOLUTION INTRAVENOUS at 00:11

## 2020-11-09 RX ADMIN — METOCLOPRAMIDE 10 MG: 10 TABLET ORAL at 20:11

## 2020-11-09 RX ADMIN — HYDROCORTISONE SODIUM SUCCINATE 10 MG: 100 INJECTION, POWDER, FOR SOLUTION INTRAMUSCULAR; INTRAVENOUS at 13:47

## 2020-11-09 RX ADMIN — METOCLOPRAMIDE 10 MG: 10 TABLET ORAL at 08:00

## 2020-11-09 RX ADMIN — DEXTROSE MONOHYDRATE 25 ML: 500 INJECTION PARENTERAL at 06:08

## 2020-11-09 RX ADMIN — SODIUM CHLORIDE 8 MG/HR: 9 INJECTION, SOLUTION INTRAVENOUS at 00:11

## 2020-11-09 RX ADMIN — POLYETHYLENE GLYCOL 3350 17 G: 17 POWDER, FOR SOLUTION ORAL at 08:00

## 2020-11-09 RX ADMIN — HYDROMORPHONE HYDROCHLORIDE 0.4 MG: 0.2 INJECTION, SOLUTION INTRAMUSCULAR; INTRAVENOUS; SUBCUTANEOUS at 16:19

## 2020-11-09 ASSESSMENT — ACTIVITIES OF DAILY LIVING (ADL)
ADLS_ACUITY_SCORE: 13

## 2020-11-09 NOTE — CONSULTS
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  She is awaiting GI consult, currently NPO receiving LR at 100 ml/h.--> plan for EGD in OR, time uncertain.  Symptomatically, she complains of back pain and chest discomfort mainly.  BG on presentation midday yesterday 190s.  Received 15 mg IV hydrocortisone last evening, none yet this morning.  This morning BG to 50 at 0600, treated with D50W.  BG dropped again at 1000, at which time insulin pump was suspended.      Chantell is known to our service from previous hospitalizations, last seen 6/2019.  She reports only change since she saw Dr. Maher in April is the reduction in basal rate.  Reports glucose at home is still variable, but better.  Having more high glucose than low.  States the 670 G with Guardian CGMS has been very helpful in managing highs and lows.  Not wearing her sensor at present.  She feels fully able to manage her pump at this time.  Reviewed basal reduction/supspension plan as well as parameter for restart and she feels comfortable with this.        Recent Labs   Lab 11/09/20  1606 11/09/20  1344 11/09/20  1210 11/09/20  1155 11/09/20  1019 11/09/20  0741 11/09/20  0646 11/09/20  0614 11/08/20  1309 11/08/20  1309   GLC  --   --   --   --   --  100* 128*  --   --  194*   * 111* 93 93 172*  --   --  174*   < >  --     < > = values in this interval not displayed.         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 complicated by insulin resistance, and several episodes of severe hypoglycemia and seizures   Diabetes Duration: 8 years  Usual Diabetes Regimen:   Medtronic 670G, with Guardian sensor- manual mode   Basal- 0.5 unit/hour  (versus 0.675 last April)  Bolus-    I:C ratio 40g,    mg/dL  target   Ability to [place] Prescribed Regimen: fair  Diabetes Control:   Lab Results   Component Value Date    A1C 7.3 11/09/2020    A1C 6.7 11/21/2019    A1C 8.2 06/11/2019    A1C Canceled, Test credited 06/10/2019    A1C 9.6 04/18/2019     Diabetes Complications: peripheral neuropathy  History of DKA: no  Able to Detect Hypoglycemia: impaired--History of severe hypoglycemia.  Twice has had high insulin level and low C-peptide c/w hypoglycemia secondary to exogenous insulin  Usual Diabetes Care Provider: Dr. Maher-- Last saw Dr. Maher 4/2/2020.  Factors Impacting Glucose Control: NPO status primarily, acute stress, steroids      Review of Systems  10 point ROS completed with pertinent positives and negatives noted in the HPI    Past medical, family and social histories are reviewed and updated.    Past Medical 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    TPIAT     Spasm of sphincter of Oddi        Family History  Family History   Problem Relation Age of Onset     Hypertension Mother      Diabetes Mother      Osteoporosis Mother      Cancer Father         pancreatic cancer     Diabetes Maternal Grandmother      Cardiovascular Maternal Grandmother      Cancer Maternal Grandfather          lung cancer     Cancer Sister         brain     Cancer Sister         liver cancer       Social History  Social History     Socioeconomic History     Marital status:      Spouse name: None     Number of children: None     Years of education: None     Highest education level: None   Occupational History     None   Social Needs     Financial resource strain: None     Food insecurity     Worry: None     Inability: None     Transportation needs     Medical: None     Non-medical: None   Tobacco Use     Smoking status: Never Smoker     Smokeless tobacco: Never Used   Substance and Sexual Activity     Alcohol use: No     Alcohol/week: 0.0 standard drinks     Drug use: No     Sexual activity: Yes   Lifestyle     Physical activity     Days per week: None     Minutes per session: None     Stress: None   Relationships     Social connections     Talks on phone: None     Gets together: None     Attends [employer] service: None     Active member of club or organization: None     Attends meetings of clubs or organizations: None     Relationship status: None     Intimate partner violence     Fear of current or ex partner: None     Emotionally abused: None     Physically abused: None     Forced sexual activity: None   Other Topics Concern     Parent/sibling w/ CABG, MI or angioplasty before 65F 55M? Not Asked   Social History Narrative     None         Physical Exam  Temp: 98.5  F (36.9  C) Temp src: Oral BP: (!) 145/58 Pulse: 77   Resp: 16 SpO2: 97 % O2 Device: None (Room air)    Wt Readings from Last 4 Encounters:   11/08/20 67.5 kg (148 lb 13 oz)   11/21/19 67.2 kg (148 lb 3.2 oz)   07/02/19 63.3 kg (139 lb 9.6 oz)   06/26/19 63.5 kg (140 lb)     Body mass index is 27.22 kg/m .    General:  pleasant woman resting in bed, in no acute distress, but appears uncomfortable  HEENT: NC/AT  Lungs: unlabored respiration, no cough observed  ABD: rounded, central adiposity  Skin: dry, no obvious lesions  MSK:  fluid movement of all  extremities  Lymp:  no LE edema   Mental status:  alert, oriented x3, communicating clearly  Psych: slightly anxious, mostly calm, even mood    Laboratory  Recent Labs   Lab Test 11/09/20  0741 11/09/20  0646    141   POTASSIUM 3.6 3.5   CHLORIDE 110* 109   CO2 26 25   ANIONGAP 5 8   * 128*   BUN 5* 5*   CR 0.76 0.75   ELIZA 8.9 8.9     CBC RESULTS:   Recent Labs   Lab Test 11/09/20  1015 11/09/20  0646   WBC  --  10.0   RBC  --  2.96*   HGB 10.0* 9.1*   HCT  --  27.3*   MCV  --  92   MCH  --  30.7   MCHC  --  33.3   RDW  --  13.7   PLT  --  276       Liver Function Studies -   Recent Labs   Lab Test 11/09/20  0741   PROTTOTAL 6.3*   ALBUMIN 3.2*   BILITOTAL 0.4   ALKPHOS 88   AST 34   ALT 50       Active Medications  Current Facility-Administered Medications   Medication     acetaminophen (TYLENOL) tablet 325-650 mg     glucose gel 15-30 g    Or     dextrose 50 % injection 25-50 mL    Or     glucagon injection 1 mg     DULoxetine (CYMBALTA) DR capsule 90 mg     hydrocortisone sodium succinate PF (solu-CORTEF) injection 10 mg     [START ON 11/10/2020] hydrocortisone sodium succinate PF (solu-CORTEF) injection 25 mg     HYDROmorphone (DILAUDID) injection 0.2-0.4 mg     insulin aspart (NovoLOG/FIASP) 100 UNIT/ML VIAL FOR FILLING PUMP RESERVOIR     insulin basal rate from AMBULATORY PUMP     lactated ringers infusion     levothyroxine (SYNTHROID/LEVOTHROID) tablet 112 mcg     Lidocaine (LIDOCARE) 4 % Patch 1 patch    And     lidocaine patch in PLACE     lidocaine (LMX4) cream     lidocaine 1 % 0.1-1 mL     Medication instructions: Do NOT use nebulized medications     melatonin tablet 1 mg     metoclopramide (REGLAN) tablet 10 mg     oxyCODONE (ROXICODONE) tablet 5-10 mg     pantoprazole (PROTONIX) 80 mg in sodium chloride 0.9 % 100 mL infusion     polyethylene glycol (MIRALAX) Packet 17 g     senna-docusate (SENOKOT-S/PERICOLACE) 8.6-50 MG per tablet 1 tablet    Or     senna-docusate (SENOKOT-S/PERICOLACE)  8.6-50 MG per tablet 2 tablet     sodium chloride (PF) 0.9% PF flush 3 mL     sodium chloride (PF) 0.9% PF flush 3 mL     topiramate (TOPAMAX) tablet 100 mg     traZODone (DESYREL) tablet 100-200 mg       Current Diet  Orders Placed This Encounter      NPO for Medical/Clinical Reasons Except for: Meds        Assessment  Chantell Kidd is a 54 year old female with past medical hx of 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 Potential for increased hyperglycemia with steroid stress dose.        Plan    - continue ambulatory insulin pump for now,   Basal Rates and Start/End times:  Rate = 0.5 units/hr from 0000 to 2400.  **if glucose less than 90 while NPO, set temporary basal rate to 50% (0.25)  **if glucose less than 60 while NPO, suspend pump.treat per hypoglycemia protocol  **after suspension, restart pump at 50% basal (0.25) once glucose >120 x 2.  ** return to usual basal rate (0.5) if  >200 x 2, on the 50% basal  Bolus program  --1 unit per 100 mg/dL glucose > 140 correction q4h  --1 unit per 40 grams carbohydrate qAC, HS, PRN snack  -BG q2h while NPO.  Return to q4h monitoring after BG stable >120 for 4 hours  -PRN IV insulin infusion (SOT protocol), to be started if BG is >200 for 4 hours with usual (0.5) basal insulin infusing via pump.  If IV insulin is started, ambulatory insulin pump to be suspended.  - stop ambulatory pump therapy for any change in mental status, declining condition, or prolonged anesthesia      Discussed w/ pt, RN, primary team    Margot Vargas APRN [id number]    To contact Endocrine Diabetes  service:   From 8AM-4PM: page inpatient diabetes provider that is following the patient  For questions or updates from 4PM-8AM: page the diabetes job code for on call fellow: [phone]      Diabetes Management Team job code: [id number]  I spent a total of 85 minutes bedside and on the inpatient unit managing the glycemic care of Chantell Kidd. Over 50% of my time on the unit was spent counseling the patient  and/or coordinating care regarding acute hypoglycemia management in pt hx labile glucose, TPAIT, hypo unaware.  See note for details.

## 2020-11-09 NOTE — PLAN OF CARE
"Time: Admission-0730    Reason for admission: Melena, COVID+, Back pain  Activity: SBA, feels unsteady, uses IV pole. Calls appropriately  Pain: C/o abdominal pain and back pain. Lidocaine patch on back, dilaudid given x2, Tylenol given x1, w/ some relief  Neuro: AOx4, able to make needs known, pleasant and cooperative  Cardiac: WDL  Respiratory: LS clear and diminished at bases, infrequent dry cough noted. PT stating she has SOB and BONNER. Feels some chest pressure with breathing \"Likes there are bricks on my chest\"  GI/: Pt staing she is having blood in stool at home, no BM since admission. Stool sample still needed. NPO for GI consult.   Diet:  NPO, GI consult  Lines: L hand PIV, SL. R wrist PIV infusing protonix at 10ml/hr and LR at 100ml/hr  Skin:  Intact, WDL  Labs/Imaging:  HGB 9.2, improving. Stool sample needed. BG 50 this AM, 25ml D50 given, BG recheck 174.    Plan: GI consult today. Endocrine consult. Stool sample needed.     "

## 2020-11-09 NOTE — H&P
[facility]     History and Physical - [facility] 2 Service        Date of Admission:  11/8/2020    Assessment & Plan   Chantell Kidd is a 54 year old F with PMHx of TPIAT (2012), nomi-en-y gastric bypass, possible adrenal insufficiency on chronic steroids, osteoporosis on bisphosphonates, migraine, and MDD/anxiety admitted with acute blood loss anemia suspected 2/2 UGIB.     # Acute blood loss anemia suspected 2/2 UGIB    Patient presented with melena and hematochezia with Hgb drop from 10.1 to 8.7 suggestive of acute blood loss anemia 2/2 UGIB in setting of chronic steroid use, prior candida esophagitis, history of nomi-en-y bypass. BUN: Cr ratio not suggestive of upper GI bleed. Hemodynamically stable. Suspected causes: possible PUD vs gastritis vs esophagitis vs marginal ulcer in setting of nomi-en-y. No history of liver disease or varices and will not start octreotide. CT AP obtained with evidence of possible colitis without evidence of diverticular disease. Will trend Hgb, treat with IV PPI, pain control as below.  - Maintain Two PIVs  - IVF resuscitation with LR at 100 mL/hr to maintain MAP>65  - Transfuse PRN to goal Hgb<7, consent obtained   - H&H monitoring q4h with potential decrease in frequency with continued stability  - Documentation of stool output, quantity, color  - Loading dose Pantoprazole 80mg IV x1 in ED, then gtt @ 8mg/hr  - Avoid NSAIDs, hold ASA 81 mg  - Will continue steroids in this patient with concern for precipitation of adrenal insufficieny  - NPO for potential endoscopy  - GI Consult for EGD/Colonoscopy  - No golytely administration today with melenic stool more concerning for UGIB, potential for golytely pending GI evaluation  - Pain: APAP 1000 mg Q8H, oxycodone 5-10 mg, dilaudid for breakthrough    # History of adrenal insufficiency  Followed by Dr. Maher. Previously suppressed AM cortisol and has been on empiric therapy for possible  adrenal insufficiency, unclear if central vs transient. Most recent clinic note describes inability to wean steroids due to hypoglycemia episodes.   - Continue hydrocortisone at PTA dose with conversion to IV  - Consider increased dose to twice PTA dose if concerns for adrenal insufficiency-- not ordered due to concerns for islet cell damage in setting of auto-islet transplant    Chronic Medical Conditions  # Post-pancreatectomy diabetes  # TPIAT (2012)  - Continue insulin via ambulatory insulin pump at 0.5 units per hour  - Q4H fingersticks while NPO  - Hypoglycemia protocol  # Dysfunctional gastric emptying   - Continue PTA metoclopramide  # MDD/anxiety  - Continue PTA duloxetine, trazodone  # Hypothyroid  - Continue PTA levothyroxine       Diet:   NPO  Fluids: LR at 100/hr  DVT Prophylaxis: Pneumatic Compression Devices  Mckee Catheter: not present  Code Status:   Full  Rule Out COVID-19 Handoff:  Chantell is a LOW SUSPICION PUI.  Follow these instructions:    If COVID test positive -> continue isolation precautions    If COVID test negative -> discontinue COVID-specific isolation precautions       Disposition Plan   Expected discharge: 2 - 3 days, recommended to prior living arrangement once hemoglobin stable and seen by all consultants.  Entered: Anne Marie Majano MD 11/08/2020, 7:03 PM       The patient's care was discussed with the Attending Physician, Dr. Rasheed.    Anne Marie Majano MD  [address]   Contact information available via [facility] Paging/Directory  Please see sign in/sign out for up to date coverage information  ______________________________________________________________________    Chief Complaint   Melena, hematochezia    History is obtained from the patient    History of Present Illness   Chantell Kidd is a 54 year old F with PMHx of TPIAT (2012), nomi-en-y gastric bypass, possible adrenal insufficiency on chronic  steroids, osteoporosis on bisphosphonates, migraine, and MDD/anxiety admitted with acute blood loss anemia suspected 2/2 UGIB.     Done notes presentation prompted by new onset of back and abdominal pain particularly at the costal margin in the periumbilical region and difficult to characterize.  Following onset of pain, she noted 6 melanotic appearing bowel movements with blood that turned the toilet water red.  She further endorses nausea and vomiting without hematemesis.  She does endorse some acid reflux symptoms and notes adherence to Protonix twice daily.  She is on bisphosphonates which she takes regularly and notes adherence to recommendations for medication use including remaining upright following administration and taking with large quantities of water.  She denies any NSAID use aside from aspirin 81 mg daily and is not on any blood thinners.  She did note headache over several days preceding presentation, however, treated with extra strength Tylenol.  She is on chronic steroids and has not had any recent changes in dose.  Endorses lightheadedness and new dyspnea on exertion with limited ambulation of approximately a block from baseline ability to walk a mile without shortness of breath.  She denies palpitations, endorses some swelling in the lower extremities slightly increased from usual despite adherence to regular diuretic use.  Denies any significant alcohol use and is a non-smoker.    She does have a history of chronic abdominal pain with most recent EGD obtained in June 2019 for evaluation of suspected upper GI bleed with demonstration of mild erythema the GE junction and severe pyloric stenosis.     Notably, she does take steroids for treatment of presumed adrenal insufficiency and is unsure if she vomited her steroid dose this morning.     ED course:  Afebrile, hemodynamically stable without tachycardia or hypotension.  Initial hemoglobin 10.1 with repeat 8.7.  Lactate negative.  CT abdomen  pelvis obtained with demonstration increase colonic wall thickening in the rectum, sigmoid and descending colon, suggestive of colitis although possibly artifactual.  Treated with pantoprazole 80 mg IV x1, Dilaudid for pain.  Admitted for further evaluation and management.  Covid pending.      Review of Systems    The 10 point Review of Systems is negative other than noted in the HPI or here.   (+) Sore throat, headache, myalgias, chills  (-) Fevers    Past Medical History    I have reviewed this patient's medical history and updated it with pertinent information if needed.   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    TPIAT     Spasm of sphincter of Oddi       Past Surgical History   I have reviewed this patient's surgical history and updated it with pertinent information if needed.  Past Surgical History:   Procedure Laterality Date     ARTHROPLASTY CARPOMETACARPAL (THUMB JOINT)  5/2/2014    Procedure: ARTHROPLASTY CARPOMETACARPAL (THUMB JOINT);  Surgeon: Carina Panda MD;  Location: [facility] OR     CHOLECYSTECTOMY  2004     COLONOSCOPY  7/18/2014    Procedure: COLONOSCOPY;  Surgeon: Aurora Sahu MD;  Location:  GI     COLONOSCOPY N/A 8/1/2017    Procedure: COLONOSCOPY;  Colonoscopy and upper endoscopy;  Surgeon: Deirdre Harris MD;  Location: [facility]     ENDOSCOPIC RETROGRADE CHOLANGIOPANCREATOGRAM       ENDOSCOPIC RETROGRADE CHOLANGIOPANCREATOGRAM  4/19/2011    Procedure:ENDOSCOPIC RETROGRADE CHOLANGIOPANCREATOGRAM; Pancreatic Stent Placement       ENDOSCOPIC RETROGRADE CHOLANGIOPANCREATOGRAM   5/26/2011    Procedure:ENDOSCOPIC RETROGRADE CHOLANGIOPANCREATOGRAM; with Pancreatic Stent Removal; Surgeon:DALE MIMS; Location:[facility] OR     ENDOSCOPY UPPER, COLONOSCOPY, COMBINED  4/25/2012    Procedure:COMBINED ENDOSCOPY UPPER, COLONOSCOPY; Enteroscopy with Bile Duct Stent Removal, Colonoscopy  *Latex Safe Room*; Surgeon:GRACY GODWIN; Location[facility]     ESOPHAGOSCOPY, GASTROSCOPY, DUODENOSCOPY (EGD), COMBINED  5/26/2011    Procedure:COMBINED ESOPHAGOSCOPY, GASTROSCOPY, DUODENOSCOPY (EGD); Surgeon:DALE MIMS; [facility]     ESOPHAGOSCOPY, GASTROSCOPY, DUODENOSCOPY (EGD), COMBINED N/A 10/30/2014    Procedure: COMBINED ESOPHAGOSCOPY, GASTROSCOPY, DUODENOSCOPY (EGD), BIOPSY SINGLE OR MULTIPLE;  Surgeon: Sarai Moon MD;  [facility]: [facility]     ESOPHAGOSCOPY, GASTROSCOPY, DUODENOSCOPY (EGD), COMBINED Left 7/6/2015    Procedure: COMBINED ESOPHAGOSCOPY, GASTROSCOPY, DUODENOSCOPY (EGD), BIOPSY SINGLE OR MULTIPLE;  Surgeon: Thomas Estrada MD;  [facility]: [facility]     ESOPHAGOSCOPY, GASTROSCOPY, DUODENOSCOPY (EGD), COMBINED N/A 7/8/2016    Procedure: COMBINED ESOPHAGOSCOPY, GASTROSCOPY, DUODENOSCOPY (EGD), BIOPSY SINGLE OR MULTIPLE;  Surgeon: Eloy Klein MD;  [facility]     ESOPHAGOSCOPY, GASTROSCOPY, DUODENOSCOPY (EGD), COMBINED N/A 8/4/2016    Procedure: COMBINED ESOPHAGOSCOPY, GASTROSCOPY, DUODENOSCOPY (EGD), BIOPSY SINGLE OR MULTIPLE;  Surgeon: Jason Brown MD;  [facility]: [facility]     ESOPHAGOSCOPY, GASTROSCOPY, DUODENOSCOPY (EGD), COMBINED N/A 8/1/2017    Procedure: COMBINED ESOPHAGOSCOPY, GASTROSCOPY, DUODENOSCOPY (EGD);;  Surgeon: Deirdre Harris MD;  [facility]: [facility]     ESOPHAGOSCOPY, GASTROSCOPY, DUODENOSCOPY (EGD), COMBINED N/A 6/12/2019    Procedure: ESOPHAGOGASTRODUODENOSCOPY (EGD);  Surgeon: Jose Francisco Perdoom MD;  Location:  GI     GYN SURGERY      Hysterectomy and O      UGI ENDOSCOPY W EUS  7/20/2011     Procedure:COMBINED ENDOSCOPIC ULTRASOUND, ESOPHAGOSCOPY, GASTROSCOPY, DUODENOSCOPY (EGD); Surgeon:DARVIN DONOHUE; [facility]     HERNIORRHAPHY VENTRAL N/A 9/15/2016    Procedure: HERNIORRHAPHY VENTRAL;  Surgeon: Juanita Bernabe MD;  [facility]     HYSTERECTOMY  1997 or 1998    USO     INCISION AND DRAINAGE ABDOMEN WASHOUT, COMBINED  8/16/2012    Procedure: COMBINED INCISION AND DRAINAGE ABDOMEN WASHOUT;  ,debridement and Drainage Post Appendectomy;  Surgeon: Ron Austin MD;  [facility]     INJECT TRANSVERSUS ABDOMINIS PLANE (TAP) BLOCK BILATERAL Bilateral 5/26/2016    Procedure: INJECT TRANSVERSUS ABDOMINIS PLANE (TAP) BLOCK BILATERAL;  Surgeon: Leonard Mccallum MD;  [facility]     LAPAROSCOPIC APPENDECTOMY  7/30/2012    Procedure: LAPAROSCOPIC APPENDECTOMY;  Open Appendectomy;  Surgeon: Ron Austin MD;  [facility]     PANCREATECTOMY, TRANSPLANT AUTO ISLET CELL, COMBINED  1/6/2012    Procedure:COMBINED PANCREATECTOMY, TRANSPLANT AUTO ISLET CELL; Total  Pancreatectomy, Auto Islet Transplant, splenectomy, 18fr. transgastric-jejunal feeding tube placement, liver biopsy; Surgeon:PALAK LEE; [facility]     REPLACE GASTROSTOMY TUBE, PERCUTANEOUS N/A 8/30/2017    Procedure: REPLACE GASTROSTOMY TUBE, PERCUTANEOUS;  GJ Tube Change;  Surgeon: Jose Nath PA-C;  [facility]     SPLENECTOMY         Social History   I have reviewed this patient's social history and updated it with pertinent information if needed. Chantell Kidd  reports that she has never smoked. She has never used smokeless tobacco. She reports that she does not drink alcohol or use drugs.    Family History   I have reviewed this patient's family history and updated it with pertinent information if needed.  Family History   Problem Relation Age of Onset     Hypertension Mother      Diabetes Mother      Osteoporosis Mother      Cancer Father         pancreatic cancer     Diabetes  Maternal Grandmother      Cardiovascular Maternal Grandmother      Cancer Maternal Grandfather         lung cancer     Cancer Sister         brain     Cancer Sister         liver cancer       Prior to Admission Medications   Prior to Admission Medications   Prescriptions Last Dose Informant Patient Reported? Taking?   DULoxetine (CYMBALTA) 30 MG capsule 11/8/2020 at AM  No Yes   Sig: Take 1 capsule (30 mg) by mouth daily With 60mg capsule for total dose of 90mg   DULoxetine (CYMBALTA) 60 MG EC capsule 11/8/2020 at AM  No Yes   Sig: Take 1 capsule (60 mg) by mouth daily With 30mg capsule for total dose of 90mg   Injection Device for insulin (NOVOPEN ECHO) RICARDO 11/8/2020  No Yes   Sig: Use with   Insulin   Nutritional Supplements (BOOST HIGH PROTEIN) LIQD 11/3/2020  No Yes   Sig: After above baseline labs are drawn, give: 6 mL/kg to maximum of 360 mL; the beverage is to be consumed within 5 minutes.   Patient taking differently: as needed After above baseline labs are drawn, give: 6 mL/kg to maximum of 360 mL; the beverage is to be consumed within 5 minutes.   SUMAtriptan (IMITREX) 50 MG tablet 11/8/2020 at AM Self No Yes   Sig: Take 1 tablet (50 mg) by mouth at onset of headache for migraine Take 1 Tab by mouth Once as needed for Migraine Headache. May repeat after two hours.  Maximum dose 200 mg/24 hours.   acetaminophen 500 MG CAPS 11/8/2020 Self Yes Yes   Sig: Take 1,000 mg by mouth three times a day as needed.   alendronate (FOSAMAX) 70 MG tablet 11/8/2020 Self No Yes   Sig: Take 1 tablet (70 mg) by mouth every 7 days On Sundays take first thing in the morning with plain water and remain upright for at least 30 minutes and until after first food of the day    Do not restart Fosamax (alendronate) until your difficulty swallowing has resolved and you have finished the entire course of fluconazole (Diflucan).   alum & mag hydroxide-simethicone (MYLANTA/MAALOX) 200-200-25 MG CHEW chewable tablet 11/5/2020  Yes Yes    Sig: Take 1 tablet by mouth 3 times daily as needed for indigestion   amylase-lipase-protease (CREON 12) 07074 units CPEP 2020 at PM  No Yes   Sig: Take 6 to 8 capsules by mouth with meals and take 4 capsules with snacks. Needs up to 25 capsules per day   aspirin 81 MG tablet 2020 at AM Self Yes Yes   Sig: Take 81 mg by mouth daily.   blood glucose (NO BRAND SPECIFIED) test strip   No No   Sig: Use to test blood sugar 8 times daily or as directed.   blood glucose (NO BRAND SPECIFIED) test strip   No No   Sig: EZ Next Contour strip.  Use to test blood sugar 6 times daily or as directed.   cyclobenzaprine (FLEXERIL) 10 MG tablet 2020 at PM  Yes Yes   Sig: Take 10 mg by mouth 3 times daily as needed for muscle spasms   diclofenac (FLECTOR) 1.3 % Patch 2020  No Yes   Sig: Place 1 patch onto the skin 2 times daily   diclofenac (VOLTAREN) 1 % GEL 2020 at PM Self Yes Yes   Si g Apply 2 g to skin four times a day as needed (to affected upper extremity joint(s)). Maximum 8g/day per joint, 16g/day total.   dicyclomine (BENTYL) 10 MG capsule 2020 at PM  Yes Yes   Sig: Take 10 mg by mouth every 6 hours   dronabinol (MARINOL) 2.5 MG capsule 2020 at AM  No Yes   Sig: Take 2 capsules (5 mg) by mouth 2 times daily as needed   hydrocortisone (CORTEF) 10 MG tablet 2020 at AM  No Yes   Sig: Take 10 mg in the morning and 10 mg at bedtime. Watch for hypoglycemia recurrence.   hydrocortisone (CORTEF) 5 MG tablet 2020 at PM  No Yes   Sig: Take 1 tablet (5 mg) by mouth At Bedtime   insulin aspart (NOVOLOG VIAL) 100 UNITS/ML vial   No Yes   Sig: Use to fill insulin pump reservoir, needs 150 units every 3 days.   insulin aspart (NOVOPEN ECHO) 100 UNIT/ML cartridge   No Yes   Sig: Correction coverage: Novolog Insulin subcutaneous before meals and HS. See pharmacy instructions   levothyroxine (SYNTHROID/LEVOTHROID) 112 MCG tablet 2020 at AM  No Yes   Sig: Take 1 tablet (112 mcg) by mouth  daily   linaclotide (LINZESS) 145 MCG capsule 11/6/2020 at AM  Yes Yes   Sig: Take 145 mcg by mouth every morning (before breakfast) as needed   metoclopramide (REGLAN) 5 MG tablet 11/8/2020 at AM  No Yes   Sig: Take 2 tablets (10 mg) by mouth 3 times daily   ondansetron (ZOFRAN-ODT) 4 MG ODT tab 11/7/2020 at PM  No Yes   Sig: DISSOLVE ONE TABLET ON THE TONGUE EVERY 6 HOURS AS NEEDED FOR NAUSEA AND VOMITING   order for DME   No No   Sig: by [employer] [place]  Ph: [phone]  Fax: [phone]    Nutren 1.5 @ 10 ml/hr with advancement by 10 ml/hr q 8 hours to goal rate of 40 ml/hr.  This will provide 1440 kcals (27 kcal/kg/day), 65 g PRO (1.2 g/kg/day), 730 mL H2O, 169 g CHO and no Fiber daily.      Patient not taking: Reported on 11/21/2019   pantoprazole (PROTONIX) 40 MG EC tablet 11/7/2020 at PM  No Yes   Sig: Take 1 tablet (40 mg) by mouth 2 times daily   polyethylene glycol (MIRALAX/GLYCOLAX) packet 11/6/2020 Self Yes Yes   Sig: Take 1 packet by mouth 2 times daily as needed for constipation    potassium chloride SA (K-DUR/KLOR-CON M) 20 MEQ CR tablet 11/8/2020 at AM  No Yes   Sig: Take 1 tablet (20 mEq) by mouth daily   senna-docusate (SENOKOT-S/PERICOLACE) 8.6-50 MG tablet 11/6/2020  No Yes   Sig: Take 1-2 tablets by mouth daily as needed for constipation   sodium chloride (OCEAN) 0.65 % nasal spray Past Week  No Yes   Sig: Spray 1 spray into both nostrils daily as needed for congestion   sucralfate (CARAFATE) 1 GM tablet 11/7/2020 at PM  No Yes   Sig: Take 1 tablet (1 g) by mouth 4 times daily (before meals and nightly)   topiramate (TOPAMAX) 100 MG tablet 11/8/2020 at AM  No Yes   Sig: Take 1 tablet (100 mg) by mouth 2 times daily   traZODone (DESYREL) 100 MG tablet 11/7/2020 at PM  No Yes   Sig: TAKE 1-2 TABLETS BY MOUTH AN HOUR BEFORE BEDTIME      Facility-Administered Medications: None     Allergies   Allergies   Allergen Reactions     Corticosteroids Other  (See Comments)     All oral, IV and injectable steroids are contraindicated (unless in life threatening situations) in Islet Auto transplant recipients. They can cause irreversible loss of islet cell function. Please contact patient's transplant care coordinator YURI Cortez RN at [phone]/pager [phone] and/or endocrinologist prior to administration.       Chocolate Flavor Rash     Breaks out when eats chocolate       Physical Exam   Vital Signs: Temp: 98.5  F (36.9  C) Temp src: Oral BP: 111/71 Pulse: 66   Resp: 14 SpO2: 95 %      Weight: 153 lbs 12.8 oz    Physical Exam  Constitutional:       General: She is not in acute distress.     Appearance: Normal appearance. She is not ill-appearing.   HENT:      Head: Normocephalic and atraumatic.      Mouth/Throat:      Mouth: Mucous membranes are moist.   Eyes:      General: No scleral icterus.     Extraocular Movements: Extraocular movements intact.   Cardiovascular:      Rate and Rhythm: Normal rate.      Pulses: Normal pulses.      Heart sounds: Normal heart sounds. No murmur. No gallop.    Pulmonary:      Effort: Pulmonary effort is normal. No respiratory distress.      Breath sounds: No wheezing.   Abdominal:      General: Abdomen is flat.      Palpations: Abdomen is soft.      Tenderness: There is abdominal tenderness. There is no guarding or rebound.      Comments: Abdominal scarring that is well-healed without erythema. TTP inferior to costal margin in band-like distribution, periumbilical tenderness   Musculoskeletal:         General: No deformity.   Skin:     General: Skin is warm and dry.      Coloration: Skin is not jaundiced.   Neurological:      General: No focal deficit present.      Mental Status: She is alert.   Psychiatric:         Thought Content: Thought content normal.           Data   Data reviewed today: I reviewed all medications, new labs and imaging results over the last 24 hours. I personally reviewed the CT image(s) showing findings  as described in HPI above.    Recent Labs   Lab 11/08/20  1634 11/08/20  1309   WBC 7.6 8.4   HGB 8.7* 10.1*   MCV 91 92    328   INR  --  0.89   NA  --  141   POTASSIUM  --  3.7   CHLORIDE  --  108   CO2  --  26   BUN  --  9   CR  --  0.66   ANIONGAP  --  7   ELIZA  --  9.1   GLC  --  194*   ALBUMIN  --  3.7   PROTTOTAL  --  7.1   BILITOTAL  --  0.2   ALKPHOS  --  101   ALT  --  66*   AST  --  58*     Recent Results (from the past 24 hour(s))   XR Chest Port 1 View    Narrative    XR CHEST PORT 1 VW  11/8/2020 2:25 PM      HISTORY: dyspnea    COMPARISON: Chest radiograph 9/3/2018    FINDINGS: AP view of the chest. Cardiac silhouette is within normal  limits. No acute airspace opacity. No pleural effusion or  pneumothorax. Cholecystectomy clips and surgical clips over the  midline upper abdomen no acute osseous abnormality.      Impression    IMPRESSION: No acute cardiopulmonary abnormality.    I have personally reviewed the examination and initial interpretation  and I agree with the findings.    KEISHA CHAUDHRY MD   CT Abdomen Pelvis w Contrast    Narrative    EXAMINATION: CT ABDOMEN PELVIS W CONTRAST, 11/8/2020 3:36 PM    TECHNIQUE:  Helical CT images from the lung bases through the  symphysis pubis were obtained with IV contrast. Contrast dose: Isovue  370    COMPARISON: Abdomen and pelvis CT 6/10/2020    HISTORY: Abd pain, acute, generalized; abd pain x 3 days w/ maroon  rectal bleeding x this AM    FINDINGS:    Lung bases: Bibasilar atelectasis.    Liver: Normal parenchymal attenuation without focal mass.  Biliary system: Cholecystectomy. Stable pneumobilia. Post surgical  changes of Venessa-en-Y hepaticojejunostomy..  Pancreas: Post surgical changes of native pancreatectomy. Right lower  quadrant pancreatic transplant appears normal.  Stomach: Surgical changes of Venessa-en-Y gastric bypass. There is  enhancement of the distal small bowel near the anastomosis, similar to  prior.  Spleen:  Splenectomy.  Adrenal glands: Within normal limits.  Kidneys: No focal mass, hydronephrosis, or stone.  Bladder: Distended otherwise within normal limits.  Reproductive organs: Postsurgical changes of hysterectomy.  Colon: There is increased colonic wall edema within the rectum,  sigmoid and descending colon. Diverticulosis without evidence of  diverticulitis.  Appendix: Appendix not definitively identified, but no secondary  findings of appendicitis.  Small Bowel: Within normal limits.  Lymph nodes: No intra-abdominal or pelvic lymphadenopathy.  Vasculature: Within normal limits.    Bones and soft tissues: No suspicious soft tissue mass or fluid  collection. No suspicious osseous lesion.      Impression    IMPRESSION:   1. Increased colonic wall thickening in the rectum, sigmoid and  descending colon, suggestive of colitis although, since the coon is  decompressed, this could be artifactual.  2. Stable extensive postsurgical changes in the abdomen. Stable  pneumobilia.    I have personally reviewed the examination and initial interpretation  and I agree with the findings.    BERONICA ARGUETA MD

## 2020-11-09 NOTE — PLAN OF CARE
"Time: 6983-3635     Reason for admission/Dx:   Gastrointestinal hemorrhage, unspecified gastrointestinal hemorrhage type [K92.2]    [Vitals]: BP (!) 145/58   Pulse 77   Temp 98.5  F (36.9  C) (Oral)   Resp 16   Ht 1.575 m (5' 2\")   Wt 67.5 kg (148 lb 13 oz)   SpO2 97%   BMI 27.22 kg/m      [Pain/PRN/s]: Back and abd pain managed with PRN IV dilaudid and PO tylenol. Per pt, oxycodone has not worked for her.  Pt also reporting intermittent chest heaviness (has not changed since last night). Trops negative.    [Respiratory]: Pt currently on RA    [Cardiac]: WDL    [Neuros]: WDL    [GI]:Orders Placed This Encounter      NPO for Medical/Clinical Reasons Except for: Meds  Pt had 1 large maroon stool today.  GI aware and would like to complete EDG in OR.    []: WDL    [Imaging/Procedures]:  Possible EGD    [Skin/Wounds]:  WDL    [Lines]:    Peripheral IV 11/08/20 Right Wrist (Active)   Site Assessment WDL 11/08/20 2346   Line Status Saline locked 11/08/20 2346   Phlebitis Scale 0-->no symptoms 11/08/20 2346   Infiltration Scale 0 11/08/20 2346   Number of days: 1       Peripheral IV 11/08/20 Left Hand (Active)   Site Assessment WDL 11/08/20 2346   Line Status Saline locked 11/08/20 2346   Dressing Intervention New dressing  11/08/20 2035   Phlebitis Scale 0-->no symptoms 11/08/20 2346   Infiltration Scale 0 11/08/20 2346   Number of days: 1         [Infusions]: LR infusing @ 100 ml/hr.  Protonix gtt infusing @ 8 mg/hr.    [Activity]: Up independently.   [Labs/Lactic]:   Hemoglobin (g/dL)   Date Value   11/09/2020 10.0 (L)     Creatinine (mg/dL)   Date Value   11/09/2020 0.76     Platelet Count (10e9/L)   Date Value   11/09/2020 276     Hematocrit (%)   Date Value   11/09/2020 27.3 (L)     WBC (10e9/L)   Date Value   11/09/2020 10.0      Lactic Acid (mmol/L)   Date Value   11/08/2020 1.7   06/13/2019 0.8      Troponin unremarkable.     [BG]:   Glucose Values Bedside Glucose (mg/dl )  GLUCOSE   Latest Ref Rng " & Units - 70 - 99 mg/dL   11/9/2020 -- 111(H)   11/9/2020 -- 93   11/9/2020 -- 93   Some recent data might be hidden      [Electrolytes]:   Potassium (mmol/L)   Date Value   11/09/2020 3.6     Magnesium (mg/dL)   Date Value   11/21/2019 2.2     Phosphorus (mg/dL)   Date Value   11/21/2019 4.1     Sodium   Date Value Ref Range Status   11/09/2020 141 133 - 144 mmol/L Final         Other: Episode of Low BG @ 47 today. 25 ml D50 given, w/ BG improvement to 174. Pt on home ambulatory insulin pump, new setting orders placed by diabetes team. Pt currently has pump off, and will resume at half rate once BG above 120 x2. Bg to be checked Q2 hrs.    Plan: Plan for possible EGD in OR once time slot available. NPO until EGD completed

## 2020-11-09 NOTE — ED NOTES
Pt has Corticosteroid listed as an allergy with high severity. Pt's tx coordinator and endocrinologist was called and consulted prior to administering the hydrocortisone injection that is ordered.

## 2020-11-09 NOTE — CONSULTS
Diabetes CNS Consult-Ambulatory Insulin Pump  Received consult request to see this 54 year old female for ambulatory insulin pump assessment.    Patient with history of total pancreatectomy and auto-islet transplant ,nomi-en-y gastric bypass, possible adrenal insufficiency on hydrocortisone (10 mg BID), osteoporosis on bisphonsphonates, migrane, major depressive disorder, anxiety, admitted 11/8/2020 with acute blood loss anemia and concern for upper GI bleed.    Called patient on phone due to COVID-19 prescautions.  Patient reports she had just spoke with MINI Kim, on the inpatient diabetes management team and given her the pump settings.    Patient reports she changed her infusion set yesterday.  She does not have extra supplies with her.  She uses a 3 ml insulin reservoir, and the [place] infusion set.  States  can bring in supplies if needed.    Carina Farmer MS, APRN, CNS, CDE, CDTC  [phone]      Patient communicating clearly on the phone.

## 2020-11-09 NOTE — CONSULTS
GASTROENTEROLOGY CONSULTATION      Date of Admission:  11/8/2020          ASSESSMENT AND RECOMMENDATIONS:   Assessment:  54 year old female with a history of chronic pancreatitis s/p total pancreatectomy and islet autotransplant (01/06/2012), DM I, GERD, impaired gastric emptying, and chronic abdominal pain who presents for evaluation due to nausea, vomiting and melena.    #Acuteonchronicanemia  #melena  #covid19  #chronic abdominal pain s/p total pancreatectomy  Patient reports several episodes of melena the past 36 hours with hemodynamic stability. I witnessed the most recent BM which does appear as melena. She has several medication related risk factors for GI bleeding, GERD, post total pancreatectomy/Venessa en Y, and delayed gastric emptying. Esophagitis, gastritis, and duodenitis related to infection (d/t steroid), PUD, medications all possible and potentially made worse by delayed gastric emptying. Malignancy possible, LGIB possible d/t ischemia, diverticulosis, or infection also but melena with associated dark blood less likely to be lower source.    -Continue PPI drip  -May proceed to clear liquids  -Continue to monitor stool amount, color, and character  -Continue to trend hgb as you are, tranfuse hgb<7 from GI standpoint  -Contact GI if large, overt GI bleeding and significant hemodynamic instability, will complete EGD immediately.        Thank you for involving us in this patient's care. Please do not hesitate to contact the GI service with any questions or concerns.     Pt care plan discussed with Dr. Michelle, GI staff physician.    BEE Hanna CNP  [phone]  -------------------------------------------------------------------------------------------------------------------          Chief Complaint:   We were asked by Dr. Quinones of medicine to evaluate this patient with rectal bleeding and anemia    History is obtained from the patient and the medical record.          History of Present Illness:    Chantell Kidd is a 54 year old female with a history of chronic pancreatitis s/p total pancreatectomy and islet autotransplant (01/06/2012), DM I, GERD, impaired gastric emptying, and chronic abdominal pain who presents for evaluation due to nausea, vomiting and melena.      She first began to have apparent blood in stools around 0100 on 10/8 with a looser stool that appeared with darker blood. Over about 24 hours she had 6 total loose, dark stools that had BRB disperse into toilet water with BM. Each BM has been less formed so far. This morning she had a witnessed bm that appears melenic. She normally has BMs most days with the aid of multiple medications, but she does experience diarrhea at times as well.    She began to have abdominal cramping and discomfort in bilateral upper quadrants under her rib cage last night with first BM. The pain is persistent, but worse preceding bm. She also has chronic abdominal pain, but this feels different. She is also reporting myalgias, fatigue, rhinorrhea and continued nausea with less vomiting now. She denies dizziness, lightheadedness, dyspnea, hematemesis, and pain with BM.    Last scopes 6/2019 from chronic abdominal pain and suspected GI bleed found GE erythema and possible pyloric obstruction that was ruled out with subsequent upper GI series and SB follow through. She reports previous UGI dilations, but do not see this mentioned in any recent EGDs.          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    TPIAT     Spasm of sphincter  of Shanei             Past Surgical History:   Reviewed and edited as appropriate   Past Surgical History:   Procedure Laterality Date     ARTHROPLASTY CARPOMETACARPAL (THUMB JOINT)  5/2/2014    Procedure: ARTHROPLASTY CARPOMETACARPAL (THUMB JOINT);  Surgeon: Carina Panda MD;  Location: [facility]     CHOLECYSTECTOMY  2004     COLONOSCOPY  7/18/2014    Procedure: COLONOSCOPY;  Surgeon: Aurora Sahu MD;  [facility]: [facility]     COLONOSCOPY N/A 8/1/2017    Procedure: COLONOSCOPY;  Colonoscopy and upper endoscopy;  Surgeon: Deirdre Harris MD;  [facility]: [facility]     ENDOSCOPIC RETROGRADE CHOLANGIOPANCREATOGRAM       ENDOSCOPIC RETROGRADE CHOLANGIOPANCREATOGRAM  4/19/2011    Procedure:ENDOSCOPIC RETROGRADE CHOLANGIOPANCREATOGRAM; Pancreatic Stent Placement       ENDOSCOPIC RETROGRADE CHOLANGIOPANCREATOGRAM  5/26/2011    Procedure:ENDOSCOPIC RETROGRADE CHOLANGIOPANCREATOGRAM; with Pancreatic Stent Removal; Surgeon:DALE MIMS; Location[facility]     ENDOSCOPY UPPER, COLONOSCOPY, COMBINED  4/25/2012    Procedure:COMBINED ENDOSCOPY UPPER, COLONOSCOPY; Enteroscopy with Bile Duct Stent Removal, Colonoscopy  *[facility] Safe Room*; Surgeon:GRACY GODWINIQ; [facility]     ESOPHAGOSCOPY, GASTROSCOPY, DUODENOSCOPY (EGD), COMBINED  5/26/2011    Procedure:COMBINED ESOPHAGOSCOPY, GASTROSCOPY, DUODENOSCOPY (EGD); Surgeon:DALE MIMS; [facility]     ESOPHAGOSCOPY, GASTROSCOPY, DUODENOSCOPY (EGD), COMBINED N/A 10/30/2014    Procedure: COMBINED ESOPHAGOSCOPY, GASTROSCOPY, DUODENOSCOPY (EGD), BIOPSY SINGLE OR MULTIPLE;  Surgeon: Sarai Moon MD;  [facility]     ESOPHAGOSCOPY, GASTROSCOPY, DUODENOSCOPY (EGD), COMBINED Left 7/6/2015    Procedure: COMBINED ESOPHAGOSCOPY, GASTROSCOPY, DUODENOSCOPY (EGD), BIOPSY SINGLE OR MULTIPLE;  Surgeon: Thomas Estrada MD;  [facility]     ESOPHAGOSCOPY, GASTROSCOPY, DUODENOSCOPY (EGD), COMBINED N/A 7/8/2016     Procedure: COMBINED ESOPHAGOSCOPY, GASTROSCOPY, DUODENOSCOPY (EGD), BIOPSY SINGLE OR MULTIPLE;  Surgeon: Eloy Klein MD;  [facility]     ESOPHAGOSCOPY, GASTROSCOPY, DUODENOSCOPY (EGD), COMBINED N/A 8/4/2016    Procedure: COMBINED ESOPHAGOSCOPY, GASTROSCOPY, DUODENOSCOPY (EGD), BIOPSY SINGLE OR MULTIPLE;  Surgeon: Jason Brown MD;  [facility]     ESOPHAGOSCOPY, GASTROSCOPY, DUODENOSCOPY (EGD), COMBINED N/A 8/1/2017    Procedure: COMBINED ESOPHAGOSCOPY, GASTROSCOPY, DUODENOSCOPY (EGD);;  Surgeon: Deirdre Harris MD;  [facility]     ESOPHAGOSCOPY, GASTROSCOPY, DUODENOSCOPY (EGD), COMBINED N/A 6/12/2019    Procedure: ESOPHAGOGASTRODUODENOSCOPY (EGD);  Surgeon: Jose Francisco Perdomo MD;  [facility]     GYN SURGERY      Hysterectomy and USO     [facility] ENDOSCOPY W EUS  7/20/2011    Procedure:COMBINED ENDOSCOPIC ULTRASOUND, ESOPHAGOSCOPY, GASTROSCOPY, DUODENOSCOPY (EGD); Surgeon:DARVIN DONOHUE; [facility]     HERNIORRHAPHY VENTRAL N/A 9/15/2016    Procedure: HERNIORRHAPHY VENTRAL;  Surgeon: Juanita Bernabe MD;  [facility]     HYSTERECTOMY  1997 or 1998    USO     INCISION AND DRAINAGE ABDOMEN WASHOUT, COMBINED  8/16/2012    Procedure: COMBINED INCISION AND DRAINAGE ABDOMEN WASHOUT;  ,debridement and Drainage Post Appendectomy;  Surgeon: Ron Austin MD;  [facility]     INJECT TRANSVERSUS ABDOMINIS PLANE (TAP) BLOCK BILATERAL Bilateral 5/26/2016    Procedure: INJECT TRANSVERSUS ABDOMINIS PLANE (TAP) BLOCK BILATERAL;  Surgeon: Leonard Mccallum MD;  [facility]     LAPAROSCOPIC APPENDECTOMY  7/30/2012    Procedure: LAPAROSCOPIC APPENDECTOMY;  Open Appendectomy;  Surgeon: Ron Austin MD;  [facility]     PANCREATECTOMY, TRANSPLANT AUTO ISLET CELL, COMBINED  1/6/2012    Procedure:COMBINED PANCREATECTOMY, TRANSPLANT AUTO ISLET CELL; Total  Pancreatectomy, Auto Islet Transplant, splenectomy, 18fr. transgastric-jejunal feeding  tube placement, liver biopsy; Surgeon:PALAK LEE; [facility]     REPLACE GASTROSTOMY TUBE, PERCUTANEOUS N/A 8/30/2017    Procedure: REPLACE GASTROSTOMY TUBE, PERCUTANEOUS;  GJ Tube Change;  Surgeon: Jose Nath PA-C;  [facility]     SPLENECTOMY              Previous Endoscopy:     EGD 8/1/2017    Upper GI Endoscopy 06/12/2019  9:56 AM [address] [place] [address] [phone]     Endoscopy Department   _______________________________________________________________________________   Patient Name: Chantell Bernabe               Procedure Date: 6/12/2019 9:56 AM   MRN: [id number]                       Account Number: [id number]   YOB: 1965             Admit Type: Inpatient   Age: 53                               Room: [facility]   Gender: Female                        Note Status: Finalized   Attending MD: Jose Francisco Perdomo MD   Total Sedation Time:   _______________________________________________________________________________       Procedure:           Upper GI endoscopy   Indications:         Abdominal pain, Iron deficiency anemia, Dysphagia   Providers:           Jose Francisco Perdomo MD, Ruth Cobb RN   Referring MD:           Medicines:           Fentanyl 50 micrograms IV, Midazolam 1 mg IV, Lidocaine                        spray   Complications:       No immediate complications.   _______________________________________________________________________________   Procedure:           Pre-Anesthesia Assessment:                        - Prior to the procedure, a History and Physical was                        performed, and patient medications and allergies were                        reviewed. The patient is competent. The risks and                        benefits of the procedure and the sedation options and                        risks were discussed with the patient. All questions                        were answered  and informed consent was obtained. Patient                        identification and proposed procedure were verified by                        the physician and the nurse in the procedure room.                        Mental Status Examination: alert and oriented. Airway                        Examination: normal oropharyngeal airway and neck                        mobility. Respiratory Examination: clear to                        auscultation. CV Examination: normal. Prophylactic                        Antibiotics: The patient does not require prophylactic                        antibiotics. Prior Anticoagulants: The patient has taken                        no previous anticoagulant or antiplatelet agents. ASA                        Grade Assessment: III - A patient with severe systemic                        disease. After reviewing the risks and benefits, the                        patient was deemed in satisfactory condition to undergo                        the procedure. The anesthesia plan was to use moderate                        sedation / analgesia (conscious sedation). Immediately                        prior to administration of medications, the patient was                        re-assessed for adequacy to receive sedatives. The heart                        rate, respiratory rate, oxygen saturations, blood                        pressure, adequacy of pulmonary ventilation, and                        response to care were monitored throughout the                        procedure. The physical status of the patient was                        re-assessed after the procedure.                        After obtaining informed consent, the endoscope was                        passed under direct vision. Throughout the procedure,                        the patient's blood pressure, pulse, and oxygen                        saturations were monitored continuously. The Endoscope                        was  introduced through the mouth, and advanced to the                        pylorus. The upper GI endoscopy was accomplished without                        difficulty. The patient tolerated the procedure well.                                                                                     Findings:        The examined esophagus was normal.        The Z-line was irregular and was found 39 cm from the incisors.        Patchy mildly erythematous mucosa without bleeding was found at the        gastroesophageal junction.        A severe stenosis was found at the pylorus. This was non-traversed.        The exam was otherwise without abnormality.                                                                                     Moderate Sedation:        Moderate (conscious) sedation was administered by the endoscopy nurse        and supervised by the endoscopist. The following parameters were        monitored: oxygen saturation, heart rate, blood pressure, and response        to care. Total physician intraservice time was 10 minutes.   Impression:          53F presenting with central abdominal pain with a PMH of                        TPIAT (1/2012 w/ Dr. Alves) who was admitted with                        reports of dysphagia, abdominal pain, reported melena                        and coffee ground emesis with 2gram drop in hemoglobin                        concerning for possible GI bleeding. Upper endoscopy                        notable for severe pyloric stenosis with 2mm os, unable                        to traverse. Otherwise notable for mild erythema at the                        GE junction and irregular z-line at 39cm fro the                        incisors. Otherwise normal exam. Solid food dysphagia                        related to evere pyloric stenosis, no clear etiology of                        reported melena identified on exam, suspect possible                        PUD, however unable to examine  duodenum.   Recommendation:      - Return patient to hospital [facility] for ongoing care.                        - Full liquid diet today.                        - Continue present medications.                        - Use a proton pump inhibitor PO BID for 8 weeks then                        once daily thereafter                        - Consider UGI series with SBFT                        - Likely repeat EGD with possible dialtion and/or stent                        pending UGI series                        - Additional recommendations per primary GI service            Results for orders placed or performed during the hospital encounter of 08/01/17   COLONOSCOPY   Result Value Ref Range    COLONOSCOPY       [facility], [address] [place] [address] [phone]     Endoscopy Department  _______________________________________________________________________________  Patient Name: Chantell Kidd               Procedure Date: 8/1/2017 7:56 AM  MRN: [id number]                       Account Number: [id number]  YOB: 1965             Admit Type: Outpatient  Age: 51                               Room: Spec. Proc.  Gender: Female                        Note Status: Finalized  Attending MD: Deirdre Harris MD  Total Sedation Time:   _______________________________________________________________________________     Procedure:           Colonoscopy  Indications:         Rectal bleeding  Providers:           Deirdre Harris MD, Yuliet Marie, CARLOS  Patient Profile:     Ms. Kidd is a 49 year old female with prior idiopathic                        chronic pancreatitis s/p EUS and multiple ERCPs with                        eventual total pancreatectomy with a uto-islet cell                        transplant in 1/2012, with ongoing chronic abdominal                        pain (with a negative evaluation including prior CTs,                        MRCPs, US abd, EGDs and colonoscopies),  nausea/vomiting                        (now improved), and iron deficiency anemia. She is                        referred for colonoscopy for BRBPR.  Referring MD:        Thomas Estrada MD  Medicines:           Monitored Anesthesia Care  Complications:       No immediate complications.  _______________________________________________________________________________  Procedure:           Pre-Anesthesia Assessment:                       - See the other procedure note for documentation of the                        pre-procedure assessment.                       - See separate Anesthesia note for pre-anesthesia                        assessment.                       After obtaining informed consent, the colonoscope was                        passed under dir ect vision. Throughout the procedure,                        the patient's blood pressure, pulse, and oxygen                        saturations were monitored continuously. The Colonoscope                        was introduced through the anus and advanced to the                        terminal ileum. The colonoscopy was performed without                        difficulty. The patient tolerated the procedure well.                        The quality of the bowel preparation was evaluated using                        the BBPS ([place] Bowel Preparation Scale) with scores                        of: Right Colon = 1 (portion of mucosa seen, but other                        areas not well seen due to staining, residual stool                        and/or opaque liquid), Transverse Colon = 2 (minor                        amount of residual staining, small fragments of stool                        and/or opaque liquid, but mucosa seen well) and Left                        Colon = 2 (minor amoun t of residual staining, small                        fragments of stool and/or opaque liquid, but mucosa seen                        well). The total BBPS score equals 5. The  quality of the                        bowel preparation was fair to good, but with extensive                        lavage and cleaning the prep was felt to be good.                                                                                   Findings:       The perianal exam findings include skin tags.       The digital rectal exam was normal.       The terminal ileum appeared normal.       Semi-liquid stool was seen in the right and mid-colon. Some solid        stool/vegetable material was found in the transverse colon, making        visualization difficult. Extensive lavage of the entire colon was        performed (>1500 mL) resulting in clearance with adequate visualization.        The solid pieces of stool had to be moved with the scope to visualize        underneath.       The exam was otherwise without  abnormality on direct and retroflexion        views.                                                                                   Impression:          No source of bleeding found on exam and no signs of                        active or recent bleeding. Noted skin tags which may                        suggest prior hemorrhoids and irregular movements which                        can predispose to fissure.                       - Preparation of the colon was fair, but after lavage                        was felt to be good.                       - Perianal skin tags found on perianal exam.                       - The examined portion of the ileum was normal.                       - Some small solid stool in the transverse colon which                        was moved to facilitate visualization.                       - The examination was otherwise normal on direct and                        retroflexion views.                       - No specimens collected.  Recommendation:      -  Discharge patient to home.                       - Resume previous diet.                       - Return to referring  physician.                       - Repeat colonoscopy for screening purposes in 10 years                        (unless there is new information regarding family                        history which would prompt earlier exam).                       - Future colonoscopies should be done with a 1.5 - 2 day                        prep.                                                                                     Deirdre Harris MD  _______________________  Deirdre Harris MD  8/1/2017 12:04:46 PM  I was physically present for the entire viewing portion of the exam.  __________________________  Signature of teaching physician  Ernestina/Tyler Harris MD  Number of Addenda: 0    Note Initiated On: 8/1/2017 7:56 AM  Scope In:  Scope Out:              Social History:   Reviewed and edited as appropriate  Social History     Socioeconomic History     Marital status:      Spouse name: Not on file     Number of children: Not on file     Years of education: Not on file     Highest education level: Not on file   Occupational History     Not on file   Social Needs     Financial resource strain: Not on file     Food insecurity     Worry: Not on file     Inability: Not on file     Transportation needs     Medical: Not on file     Non-medical: Not on file   Tobacco Use     Smoking status: Never Smoker     Smokeless tobacco: Never Used   Substance and Sexual Activity     Alcohol use: No     Alcohol/week: 0.0 standard drinks     Drug use: No     Sexual activity: Yes   Lifestyle     Physical activity     Days per week: Not on file     Minutes per session: Not on file     Stress: Not on file   Relationships     Social connections     Talks on phone: Not on file     Gets together: Not on file     Attends Zoroastrianism service: Not on file     Active member of club or organization: Not on file     Attends meetings of clubs or organizations: Not on file     Relationship status: Not on file     Intimate partner  violence     Fear of current or ex partner: Not on file     Emotionally abused: Not on file     Physically abused: Not on file     Forced sexual activity: Not on file   Other Topics Concern     Parent/sibling w/ CABG, MI or angioplasty before 65F 55M? Not Asked   Social History Narrative     Not on file            Family History:   Reviewed and edited as appropriate  Family History   Problem Relation Age of Onset     Hypertension Mother      Diabetes Mother      Osteoporosis Mother      Cancer Father         pancreatic cancer     Diabetes Maternal Grandmother      Cardiovascular Maternal Grandmother      Cancer Maternal Grandfather         lung cancer     Cancer Sister         brain     Cancer Sister         liver cancer            Allergies:   Reviewed and edited as appropriate     Allergies   Allergen Reactions     Corticosteroids Other (See Comments)     All oral, IV and injectable steroids are contraindicated (unless in life threatening situations) in Islet Auto transplant recipients. They can cause irreversible loss of islet cell function. Please contact patient's transplant care coordinator YURI Cortez RN at [phone]/pager [phone] and/or endocrinologist prior to administration.       Chocolate Flavor Rash     Breaks out when eats chocolate            Medications:     Current Facility-Administered Medications   Medication     acetaminophen (TYLENOL) tablet 325-650 mg     glucose gel 15-30 g    Or     dextrose 50 % injection 25-50 mL    Or     glucagon injection 1 mg     DULoxetine (CYMBALTA) DR capsule 90 mg     hydrocortisone sodium succinate PF (solu-CORTEF) injection 15 mg     HYDROmorphone (DILAUDID) injection 0.2-0.4 mg     insulin aspart (NovoLOG/FIASP) 100 UNIT/ML VIAL FOR FILLING PUMP RESERVOIR     insulin basal rate from AMBULATORY PUMP     lactated ringers infusion     levothyroxine (SYNTHROID/LEVOTHROID) tablet 112 mcg     Lidocaine (LIDOCARE) 4 % Patch 1 patch    And     lidocaine patch  "in PLACE     lidocaine (LMX4) cream     lidocaine 1 % 0.1-1 mL     Medication instructions: Do NOT use nebulized medications     melatonin tablet 1 mg     metoclopramide (REGLAN) tablet 10 mg     oxyCODONE (ROXICODONE) tablet 5-10 mg     pantoprazole (PROTONIX) 80 mg in sodium chloride 0.9 % 100 mL infusion     polyethylene glycol (MIRALAX) Packet 17 g     senna-docusate (SENOKOT-S/PERICOLACE) 8.6-50 MG per tablet 1 tablet    Or     senna-docusate (SENOKOT-S/PERICOLACE) 8.6-50 MG per tablet 2 tablet     sodium chloride (PF) 0.9% PF flush 3 mL     sodium chloride (PF) 0.9% PF flush 3 mL     topiramate (TOPAMAX) tablet 100 mg     traZODone (DESYREL) tablet 100-200 mg             Review of Systems:     A complete review of systems was performed and is negative except as noted in the HPI           Physical Exam:   /61   Pulse 67   Temp 97.6  F (36.4  C) (Oral)   Resp 14   Ht 1.575 m (5' 2\")   Wt 67.5 kg (148 lb 13 oz)   SpO2 97%   BMI 27.22 kg/m    Wt:   Wt Readings from Last 2 Encounters:   11/08/20 67.5 kg (148 lb 13 oz)   11/21/19 67.2 kg (148 lb 3.2 oz)      Constitutional: cooperative, pleasant, not dyspneic/diaphoretic, no acute distress  Eyes: Sclera anicteric/injected  Ears/nose/mouth/throat: Mucus membranes dry, hearing intact  Neck: supple without obvious mass  CV: Trace LLE  Respiratory: Unlabored breathing  Abd: Nondistended, +bs, no hepatosplenomegaly, nontender, no peritoneal signs  Skin: warm, perfused, no jaundice  Neuro: AAO x 3, No asterixis  Psych: Normal affect  MSK: No gross deformities  Rectal: no mass, external hemorrhoids, fissure, or blood appreciated. RN chaperone present for exam. Observed stool sample consistent with melena          Data:   Labs and imaging below were independently reviewed and interpreted    BMP  Recent Labs   Lab 11/08/20  1309      POTASSIUM 3.7   CHLORIDE 108   ELIZA 9.1   CO2 26   BUN 9   CR 0.66   *     CBC  Recent Labs   Lab 11/09/20  0152 " 11/08/20  1634 11/08/20  1309   WBC  --  7.6 8.4   RBC  --  2.88* 3.41*   HGB 9.2* 8.7* 10.1*   HCT  --  26.3* 31.5*   MCV  --  91 92   MCH  --  30.2 29.6   MCHC  --  33.1 32.1   RDW  --  13.4 13.4   PLT  --  277 328     INR  Recent Labs   Lab 11/08/20  1309   INR 0.89     LFTs  Recent Labs   Lab 11/08/20  1309   ALKPHOS 101   AST 58*   ALT 66*   BILITOTAL 0.2   PROTTOTAL 7.1   ALBUMIN 3.7      PANCNo lab results found in last 7 days.           Examination:  XR UPPER GI W SMALL BOWEL FOLLOW THROUGH 6/13/2019 11:20  AM      Comparison: EGD 6/12/2019, CT abdomen and pelvis 6/10/2019, upper GI  11/17/2016     History: Pt w/ recent EGD (for melena) showing pyloric stenosis.     Fluoroscopy time: 1.5 minutes.     Technique: Double contrast modified upper GI.     Findings:  A double contrast modified upper GI was performed. Prompt  contrast passage from the esophagus into the stomach. No esophageal  filling defects. The rugal pattern is within normal limits. The  visualized proximal small bowel is within normal limits.                                                                       Impression: In this patient with a pylorus sparing  pancreaticoduodenectomy with duodedenal-duodenal anastomosis, no  definite stricture identified.     I have personally reviewed the examination and initial interpretation  and I agree with the findings.    Imaging:    CT AP 11/8/2020    IMPRESSION:   1. Increased colonic wall thickening in the rectum, sigmoid and  descending colon, suggestive of colitis although, since the coon is  decompressed, this could be artifactual.  2. Stable extensive postsurgical changes in the abdomen. Stable  pneumobilia.     I have personally reviewed the examination and initial interpretation  and I agree with the findings.     BERONICA ARGUETA MD

## 2020-11-10 LAB
ANION GAP SERPL CALCULATED.3IONS-SCNC: 6 MMOL/L (ref 3–14)
BASOPHILS # BLD AUTO: 0 10E9/L (ref 0–0.2)
BASOPHILS NFR BLD AUTO: 0.5 %
BUN SERPL-MCNC: 6 MG/DL (ref 7–30)
CALCIUM SERPL-MCNC: 8.6 MG/DL (ref 8.5–10.1)
CHLORIDE SERPL-SCNC: 108 MMOL/L (ref 94–109)
CO2 SERPL-SCNC: 24 MMOL/L (ref 20–32)
CREAT SERPL-MCNC: 0.78 MG/DL (ref 0.52–1.04)
CRP SERPL-MCNC: <2.9 MG/L (ref 0–8)
D DIMER PPP FEU-MCNC: 0.4 UG/ML FEU (ref 0–0.5)
DIFFERENTIAL METHOD BLD: ABNORMAL
EOSINOPHIL # BLD AUTO: 0 10E9/L (ref 0–0.7)
EOSINOPHIL NFR BLD AUTO: 0.1 %
ERYTHROCYTE [DISTWIDTH] IN BLOOD BY AUTOMATED COUNT: 13.3 % (ref 10–15)
FIBRINOGEN PPP-MCNC: 275 MG/DL (ref 200–420)
GFR SERPL CREATININE-BSD FRML MDRD: 85 ML/MIN/{1.73_M2}
GLUCOSE BLDC GLUCOMTR-MCNC: 116 MG/DL (ref 70–99)
GLUCOSE BLDC GLUCOMTR-MCNC: 127 MG/DL (ref 70–99)
GLUCOSE BLDC GLUCOMTR-MCNC: 131 MG/DL (ref 70–99)
GLUCOSE BLDC GLUCOMTR-MCNC: 169 MG/DL (ref 70–99)
GLUCOSE BLDC GLUCOMTR-MCNC: 66 MG/DL (ref 70–99)
GLUCOSE BLDC GLUCOMTR-MCNC: 83 MG/DL (ref 70–99)
GLUCOSE BLDC GLUCOMTR-MCNC: 88 MG/DL (ref 70–99)
GLUCOSE BLDC GLUCOMTR-MCNC: 92 MG/DL (ref 70–99)
GLUCOSE BLDC GLUCOMTR-MCNC: 94 MG/DL (ref 70–99)
GLUCOSE BLDC GLUCOMTR-MCNC: 95 MG/DL (ref 70–99)
GLUCOSE SERPL-MCNC: 145 MG/DL (ref 70–99)
HCT VFR BLD AUTO: 28.6 % (ref 35–47)
HGB BLD-MCNC: 9.5 G/DL (ref 11.7–15.7)
HGB BLD-MCNC: 9.8 G/DL (ref 11.7–15.7)
IMM GRANULOCYTES # BLD: 0.2 10E9/L (ref 0–0.4)
IMM GRANULOCYTES NFR BLD: 1.8 %
INR PPP: 1.04 (ref 0.86–1.14)
LDH SERPL L TO P-CCNC: 214 U/L (ref 81–234)
LYMPHOCYTES # BLD AUTO: 1.2 10E9/L (ref 0.8–5.3)
LYMPHOCYTES NFR BLD AUTO: 15.1 %
MCH RBC QN AUTO: 30.8 PG (ref 26.5–33)
MCHC RBC AUTO-ENTMCNC: 34.3 G/DL (ref 31.5–36.5)
MCV RBC AUTO: 90 FL (ref 78–100)
MONOCYTES # BLD AUTO: 0.3 10E9/L (ref 0–1.3)
MONOCYTES NFR BLD AUTO: 3.5 %
NEUTROPHILS # BLD AUTO: 6.5 10E9/L (ref 1.6–8.3)
NEUTROPHILS NFR BLD AUTO: 79 %
NRBC # BLD AUTO: 0 10*3/UL
NRBC BLD AUTO-RTO: 0 /100
PLATELET # BLD AUTO: 299 10E9/L (ref 150–450)
POTASSIUM SERPL-SCNC: 3.5 MMOL/L (ref 3.4–5.3)
RBC # BLD AUTO: 3.18 10E12/L (ref 3.8–5.2)
RETICS # AUTO: 95.1 10E9/L (ref 25–95)
RETICS/RBC NFR AUTO: 3 % (ref 0.5–2)
SODIUM SERPL-SCNC: 139 MMOL/L (ref 133–144)
TROPONIN I SERPL-MCNC: <0.015 UG/L (ref 0–0.04)
WBC # BLD AUTO: 8.2 10E9/L (ref 4–11)

## 2020-11-10 PROCEDURE — 93010 ELECTROCARDIOGRAM REPORT: CPT | Performed by: INTERNAL MEDICINE

## 2020-11-10 PROCEDURE — 85025 COMPLETE CBC W/AUTO DIFF WBC: CPT | Performed by: STUDENT IN AN ORGANIZED HEALTH CARE EDUCATION/TRAINING PROGRAM

## 2020-11-10 PROCEDURE — 83615 LACTATE (LD) (LDH) ENZYME: CPT | Performed by: STUDENT IN AN ORGANIZED HEALTH CARE EDUCATION/TRAINING PROGRAM

## 2020-11-10 PROCEDURE — 85018 HEMOGLOBIN: CPT | Performed by: STUDENT IN AN ORGANIZED HEALTH CARE EDUCATION/TRAINING PROGRAM

## 2020-11-10 PROCEDURE — 85379 FIBRIN DEGRADATION QUANT: CPT | Performed by: STUDENT IN AN ORGANIZED HEALTH CARE EDUCATION/TRAINING PROGRAM

## 2020-11-10 PROCEDURE — 258N000001 HC RX 258: Performed by: STUDENT IN AN ORGANIZED HEALTH CARE EDUCATION/TRAINING PROGRAM

## 2020-11-10 PROCEDURE — 84484 ASSAY OF TROPONIN QUANT: CPT | Performed by: STUDENT IN AN ORGANIZED HEALTH CARE EDUCATION/TRAINING PROGRAM

## 2020-11-10 PROCEDURE — 99232 SBSQ HOSP IP/OBS MODERATE 35: CPT | Performed by: CLINICAL NURSE SPECIALIST

## 2020-11-10 PROCEDURE — 258N000003 HC RX IP 258 OP 636: Performed by: STUDENT IN AN ORGANIZED HEALTH CARE EDUCATION/TRAINING PROGRAM

## 2020-11-10 PROCEDURE — 85384 FIBRINOGEN ACTIVITY: CPT | Performed by: STUDENT IN AN ORGANIZED HEALTH CARE EDUCATION/TRAINING PROGRAM

## 2020-11-10 PROCEDURE — C9113 INJ PANTOPRAZOLE SODIUM, VIA: HCPCS | Performed by: STUDENT IN AN ORGANIZED HEALTH CARE EDUCATION/TRAINING PROGRAM

## 2020-11-10 PROCEDURE — 250N000011 HC RX IP 250 OP 636: Performed by: STUDENT IN AN ORGANIZED HEALTH CARE EDUCATION/TRAINING PROGRAM

## 2020-11-10 PROCEDURE — 999N001017 HC STATISTIC GLUCOSE BY METER IP

## 2020-11-10 PROCEDURE — 250N000013 HC RX MED GY IP 250 OP 250 PS 637: Performed by: STUDENT IN AN ORGANIZED HEALTH CARE EDUCATION/TRAINING PROGRAM

## 2020-11-10 PROCEDURE — 85610 PROTHROMBIN TIME: CPT | Performed by: STUDENT IN AN ORGANIZED HEALTH CARE EDUCATION/TRAINING PROGRAM

## 2020-11-10 PROCEDURE — 80048 BASIC METABOLIC PNL TOTAL CA: CPT | Performed by: STUDENT IN AN ORGANIZED HEALTH CARE EDUCATION/TRAINING PROGRAM

## 2020-11-10 PROCEDURE — 120N000002 HC R&B MED SURG/OB UMMC

## 2020-11-10 PROCEDURE — 99233 SBSQ HOSP IP/OBS HIGH 50: CPT | Performed by: NURSE PRACTITIONER

## 2020-11-10 PROCEDURE — 36415 COLL VENOUS BLD VENIPUNCTURE: CPT | Performed by: STUDENT IN AN ORGANIZED HEALTH CARE EDUCATION/TRAINING PROGRAM

## 2020-11-10 PROCEDURE — 85045 AUTOMATED RETICULOCYTE COUNT: CPT | Performed by: STUDENT IN AN ORGANIZED HEALTH CARE EDUCATION/TRAINING PROGRAM

## 2020-11-10 PROCEDURE — 93005 ELECTROCARDIOGRAM TRACING: CPT

## 2020-11-10 PROCEDURE — 86140 C-REACTIVE PROTEIN: CPT | Performed by: STUDENT IN AN ORGANIZED HEALTH CARE EDUCATION/TRAINING PROGRAM

## 2020-11-10 PROCEDURE — 99233 SBSQ HOSP IP/OBS HIGH 50: CPT | Mod: GC | Performed by: INTERNAL MEDICINE

## 2020-11-10 RX ORDER — ACETAMINOPHEN 325 MG/1
975 TABLET ORAL EVERY 8 HOURS
Status: DISCONTINUED | OUTPATIENT
Start: 2020-11-10 | End: 2020-11-12 | Stop reason: HOSPADM

## 2020-11-10 RX ORDER — ONDANSETRON 4 MG/1
4-8 TABLET, FILM COATED ORAL EVERY 6 HOURS PRN
Status: DISCONTINUED | OUTPATIENT
Start: 2020-11-10 | End: 2020-11-12 | Stop reason: HOSPADM

## 2020-11-10 RX ORDER — NALOXONE HYDROCHLORIDE 0.4 MG/ML
.1-.4 INJECTION, SOLUTION INTRAMUSCULAR; INTRAVENOUS; SUBCUTANEOUS
Status: DISCONTINUED | OUTPATIENT
Start: 2020-11-10 | End: 2020-11-12 | Stop reason: HOSPADM

## 2020-11-10 RX ORDER — HYDROMORPHONE HYDROCHLORIDE 2 MG/1
2-4 TABLET ORAL EVERY 4 HOURS PRN
Status: DISCONTINUED | OUTPATIENT
Start: 2020-11-10 | End: 2020-11-11

## 2020-11-10 RX ADMIN — DEXTROSE MONOHYDRATE 25 ML: 500 INJECTION PARENTERAL at 04:47

## 2020-11-10 RX ADMIN — ACETAMINOPHEN 975 MG: 325 TABLET, FILM COATED ORAL at 20:28

## 2020-11-10 RX ADMIN — ACETAMINOPHEN 650 MG: 325 TABLET, FILM COATED ORAL at 05:01

## 2020-11-10 RX ADMIN — SODIUM CHLORIDE 8 MG/HR: 9 INJECTION, SOLUTION INTRAVENOUS at 16:00

## 2020-11-10 RX ADMIN — SODIUM CHLORIDE, POTASSIUM CHLORIDE, SODIUM LACTATE AND CALCIUM CHLORIDE: 600; 310; 30; 20 INJECTION, SOLUTION INTRAVENOUS at 05:02

## 2020-11-10 RX ADMIN — METOCLOPRAMIDE 10 MG: 10 TABLET ORAL at 15:25

## 2020-11-10 RX ADMIN — SODIUM CHLORIDE, POTASSIUM CHLORIDE, SODIUM LACTATE AND CALCIUM CHLORIDE: 600; 310; 30; 20 INJECTION, SOLUTION INTRAVENOUS at 15:20

## 2020-11-10 RX ADMIN — HYDROMORPHONE HYDROCHLORIDE 0.4 MG: 0.2 INJECTION, SOLUTION INTRAMUSCULAR; INTRAVENOUS; SUBCUTANEOUS at 15:20

## 2020-11-10 RX ADMIN — DULOXETINE HYDROCHLORIDE 90 MG: 30 CAPSULE, DELAYED RELEASE ORAL at 09:07

## 2020-11-10 RX ADMIN — HYDROMORPHONE HYDROCHLORIDE 0.4 MG: 0.2 INJECTION, SOLUTION INTRAMUSCULAR; INTRAVENOUS; SUBCUTANEOUS at 08:59

## 2020-11-10 RX ADMIN — HYDROMORPHONE HYDROCHLORIDE 0.4 MG: 0.2 INJECTION, SOLUTION INTRAMUSCULAR; INTRAVENOUS; SUBCUTANEOUS at 12:01

## 2020-11-10 RX ADMIN — SODIUM CHLORIDE 8 MG/HR: 9 INJECTION, SOLUTION INTRAVENOUS at 04:54

## 2020-11-10 RX ADMIN — METOCLOPRAMIDE 10 MG: 10 TABLET ORAL at 20:28

## 2020-11-10 RX ADMIN — HYDROMORPHONE HYDROCHLORIDE 0.4 MG: 0.2 INJECTION, SOLUTION INTRAMUSCULAR; INTRAVENOUS; SUBCUTANEOUS at 04:58

## 2020-11-10 RX ADMIN — ACETAMINOPHEN 325 MG: 325 TABLET, FILM COATED ORAL at 12:01

## 2020-11-10 RX ADMIN — TOPIRAMATE 100 MG: 50 TABLET ORAL at 09:08

## 2020-11-10 RX ADMIN — ONDANSETRON HYDROCHLORIDE 4 MG: 4 TABLET, FILM COATED ORAL at 01:17

## 2020-11-10 RX ADMIN — HYDROMORPHONE HYDROCHLORIDE 0.4 MG: 0.2 INJECTION, SOLUTION INTRAMUSCULAR; INTRAVENOUS; SUBCUTANEOUS at 22:10

## 2020-11-10 RX ADMIN — METOCLOPRAMIDE 10 MG: 10 TABLET ORAL at 09:08

## 2020-11-10 RX ADMIN — LEVOTHYROXINE SODIUM 112 MCG: 0.11 TABLET ORAL at 09:08

## 2020-11-10 RX ADMIN — ACETAMINOPHEN 650 MG: 325 TABLET, FILM COATED ORAL at 08:59

## 2020-11-10 RX ADMIN — HYDROCORTISONE SODIUM SUCCINATE 25 MG: 100 INJECTION, POWDER, FOR SOLUTION INTRAMUSCULAR; INTRAVENOUS at 15:17

## 2020-11-10 RX ADMIN — HYDROMORPHONE HYDROCHLORIDE 0.4 MG: 0.2 INJECTION, SOLUTION INTRAMUSCULAR; INTRAVENOUS; SUBCUTANEOUS at 18:45

## 2020-11-10 RX ADMIN — HYDROCORTISONE SODIUM SUCCINATE 25 MG: 100 INJECTION, POWDER, FOR SOLUTION INTRAMUSCULAR; INTRAVENOUS at 02:23

## 2020-11-10 RX ADMIN — HYDROMORPHONE HYDROCHLORIDE 0.4 MG: 0.2 INJECTION, SOLUTION INTRAMUSCULAR; INTRAVENOUS; SUBCUTANEOUS at 00:18

## 2020-11-10 RX ADMIN — TOPIRAMATE 100 MG: 50 TABLET ORAL at 20:28

## 2020-11-10 ASSESSMENT — ACTIVITIES OF DAILY LIVING (ADL)
ADLS_ACUITY_SCORE: 13

## 2020-11-10 NOTE — PROGRESS NOTES
"CLINICAL NUTRITION SERVICES - ASSESSMENT NOTE     Nutrition Prescription    RECOMMENDATIONS FOR MDs/PROVIDERS TO ORDER:  Once diet is advanced to full liquids please order Creon 12 6 capsules with meals and 4 capsules with snacks     Malnutrition Status:    Unable to determine due to incomplete NFPE    Recommendations already ordered by Registered Dietitian (RD):  Boost Breeze with meals    Future/Additional Recommendations:  -- monitor diet advancement  -- monitor oral intake      REASON FOR ASSESSMENT  Chantell Kidd is a/an 54 year old female assessed by the dietitian for Admission Nutrition Risk Screen for reduced oral intake over the last month    NUTRITION HISTORY  Per chart review PMHx of TPIAT (2012), nomi-en-y gastric bypass, possible adrenal insufficiency on chronic steroids, osteoporosis on bisphosphonates, migraine, and MDD/anxiety admitted with acute blood loss anemia suspected 2/2 UGIB.     Patient reports she takes Creon 12 6 capsules (this provides 1059 units of lipase/kg/meal) with meals and 4 capsules with snacks. She also takes Vitamin D and MVI at home.   She reports her appetite has been poor for he last couple of weeks with eating soups and drinking a lot of water. She likes boost breeze but did not like the regular boost as it did not taste right to her.     CURRENT NUTRITION ORDERS  Diet: Clear Liquid  Intake/Tolerance: minimal intake     LABS  Labs reviewed  (11/10): BUN 6 mg/dL (L)    MEDICATIONS  Medications reviewed  Miralax    ANTHROPOMETRICS  Height: 157.5 cm (5' 2\")  Most Recent Weight: 67.5 kg (148 lb 13 oz)    IBW: 49.8 kg  BMI: Overweight BMI 25-29.9  Weight History:   Wt Readings from Last 10 Encounters:   11/08/20 67.5 kg (148 lb 13 oz)   11/21/19 67.2 kg (148 lb 3.2 oz)   07/02/19 63.3 kg (139 lb 9.6 oz)   06/26/19 63.5 kg (140 lb)   06/10/19 62.8 kg (138 lb 8 oz)   04/18/19 61.1 kg (134 lb 11.2 oz)   10/04/18 61.7 kg (136 lb)   09/27/18 61.7 kg (136 lb)   09/27/18 62.6 kg (138 lb) "   09/19/18 62.6 kg (138 lb)   denies recent weight loss  Dosing Weight: 54 kg (adjBW based off IBW and admit weight 67.5 kg )    ASSESSED NUTRITION NEEDS  Estimated Energy Needs:4396-2830 kcals/day (25 - 30 kcals/kg)  Justification: Maintenance  Estimated Protein Needs: 65-81 grams protein/day (1.2 - 1.5 grams of pro/kg)  Justification: Maintenance  Estimated Fluid Needs: 1 mL/kcal   Justification: Maintenance    PHYSICAL FINDINGS  See malnutrition section below.  Patient is currently considered a person under investigation or positive for COVID-19.    Unable to obtain in-person nutrition history or nutrition focused physical assessment (NFPA) from patient as the number of staff going into rooms is restricted to limit exposure and to minimize use of PPE.     MALNUTRITION  % Intake: </= 50% for >/= 5 days (severe)  % Weight Loss: None noted  Subcutaneous Fat Loss: Unable to assess  Muscle Loss: Unable to assess  Fluid Accumulation/Edema: None noted  Malnutrition Diagnosis: Unable to determine due to incomplete NFPE    NUTRITION DIAGNOSIS  Inadequate oral intake related to no appetite as evidenced by PO intake < 50% in the last few weeks per patient report       INTERVENTIONS  Implementation  Nutrition Education: See education flowsheet   Medical food supplement therapy     Goals  Patient to consume % of nutritionally adequate meal trays TID, or the equivalent with supplements/snacks.     Monitoring/Evaluation  Progress toward goals will be monitored and evaluated per protocol.    Denise Tomlin, RD  5A [phone])/7B Pager [phone]

## 2020-11-10 NOTE — PLAN OF CARE
"Time: 1624-3648    Reason for admission: Back pain, N/V, melena, general aches       Vitals: /76 (BP Location: Right arm)   Pulse 79   Temp 98.6  F (37  C) (Oral)   Resp 16   Ht 1.575 m (5' 2\")   Wt 67.5 kg (148 lb 13 oz)   SpO2 97%   BMI 27.22 kg/m      Activity: I/SBA  Diet: Clear liquids   Pain: C/o abdominal, back, head, and chest pain. (Team aware of chest pain, not new). Dilaudid 0.4 mg administered x2, Tylenol 650 mg administered x1 with relief   Respiratory: RA, dyspnea with exertion, LS clear, productive cough   Cardiovascular: WNL  GI: One loose, maroon stool this shift. Abdominal discomfort.   : WNL  Skin: Visible skin WNL   Neurologic: A&O  Labs: Monitoring glucose, trending hgb - 9.8, neg troponin  Lines: L PIV SL, R PIV infusing LR @100 mL/hr + Protonix @8 mg/hr    New changes this shift: Pt restarted on 1/2 basal rate on ambulatory pump (0.25 units/hr). 2 bolus units administered. Started on clear liquids.     Plan: Probable discharge tomorrow. Likely to schedule EGD outpatient for Wednesday.       "

## 2020-11-10 NOTE — PROGRESS NOTES
[facility]     Progress Note - Sydni 2 Service        Date of Admission:  11/8/2020    Assessment & Plan       Chantell Kidd is a 54 year old F with PMHx of TPIAT (2012), nomi-en-y gastric bypass, possible adrenal insufficiency on chronic steroids, osteoporosis on bisphosphonates, migraine, and MDD/anxiety admitted with acute blood loss anemia suspected 2/2 UGIB.     # Acute blood loss anemia suspected 2/2 UGIB    Patient presented with melena and hematochezia with Hgb drop from 10.1 to 8.7 suggestive of acute blood loss anemia 2/2 UGIB in setting of chronic steroid use, prior candida esophagitis, history of nomi-en-y bypass. BUN: Cr ratio not suggestive of upper GI bleed. Hemodynamically stable. Suspected causes: possible PUD vs gastritis vs esophagitis vs marginal ulcer in setting of nomi-en-y. No history of liver disease or varices and will not start octreotide. CT AP obtained with evidence of possible colitis without evidence of diverticular disease. Will trend Hgb, treat with IV PPI, pain control as below.  - Maintain Two PIVs  - IVF resuscitation with LR at 100 mL/hr to maintain MAP>65  - Transfuse PRN to goal Hgb<7, consent obtained   - H&H monitoring q6h with potential decrease in frequency with continued stability  - Documentation of stool output, quantity, color  - Continue Pantoprazole 80mg gtt, s/p 80 mg IV x1 in ED  - Avoid NSAIDs, hold ASA 81 mg  - Will continue steroids in this patient with concern for precipitation of adrenal insufficieny  - GI consulted, appreciate assistance  - CLD as no EGD anticipated today  - Pain: APAP 1000 mg Q8H, oxycodone 5-10 mg, dilaudid for breakthrough    # History of adrenal insufficiency  Followed by Dr. Maher. Previously suppressed AM cortisol and has been on empiric therapy for possible adrenal insufficiency, unclear if central vs transient. Most recent clinic note describes inability to wean steroids due to  hypoglycemia episodes.   - Hydrocortisone at 25 mg IV Q12H for possible adrenal insufficiency, dosed IV with concern for ability to tolerate PO    # Post-pancreatectomy diabetes  # TPIAT (2012)  BG lability with hypoglycemia following admission. Endocrinology consulted for assistance.   - Endocrinology consulted, appreciate recommendations   -BG q2h while NPO.  Return to q4h monitoring after BG stable >120 for 4 hours   -PRN IV insulin infusion (SOT protocol), to be started if BG is >200 for 4 hours with usual (0.5) basal insulin infusing via pump.  If IV insulin is started, ambulatory insulin pump to be suspended.   - stop ambulatory pump therapy for any change in mental status, declining condition, or prolonged anesthesia  - Hypoglycemia protocol    Chronic Medical Conditions  # Dysfunctional gastric emptying   - Continue PTA metoclopramide  # MDD/anxiety  - Continue PTA duloxetine, trazodone  # Hypothyroid  - Continue PTA levothyroxine       Diet: Clear Liquid Diet  Advanced from NPO  Fluids: LR at 100/hr  Lines: PIV  DVT Prophylaxis: Pneumatic Compression Devices  Mckee Catheter: not present  Code Status: Full Code           Disposition Plan   Expected discharge: 2 - 3 days, recommended to prior living arrangement once adequate pain management/ tolerating PO medications and hemoglobin stable.  Entered: Anne Marie Majano MD 11/10/2020, 6:16 AM       The patient's care was discussed with the Attending Physician, Dr. Quinones.    Anne Marie Majano MD  [address]   Please see sign in/sign out for up to date coverage information  ______________________________________________________________________    Interval History   Nursing notes reviewed. BG low requiring D50. Continues to have abdominal pain. Continued melenic stool. Hgb stable. No hemodynamic changes. GI following for possible EGD.    No new concerns or complaints.    Data  reviewed today: I reviewed all medications, new labs and imaging results over the last 24 hours. I personally reviewed no images or EKG's today.    Physical Exam   Vital Signs: Temp: 97  F (36.1  C) Temp src: Oral BP: (!) 144/69 Pulse: 72   Resp: 14 SpO2: 95 % O2 Device: None (Room air)    Weight: 148 lbs 12.97 oz  Physical Exam  Constitutional:       General: She is not in acute distress.     Appearance: Normal appearance. She is not ill-appearing.   HENT:      Head: Normocephalic and atraumatic.      Mouth/Throat:      Mouth: Mucous membranes are moist.   Eyes:      General: No scleral icterus.     Extraocular Movements: Extraocular movements intact.   Cardiovascular:      Rate and Rhythm: Normal rate.      Pulses: Normal pulses.      Heart sounds: Normal heart sounds. No murmur. No gallop.    Pulmonary:      Effort: Pulmonary effort is normal. No respiratory distress.      Breath sounds: No wheezing.   Abdominal:      General: Abdomen is flat.      Palpations: Abdomen is soft.      Tenderness: There is abdominal tenderness. There is no guarding or rebound.      Comments: Abdominal scarring that is well-healed without erythema. TTP inferior to costal margin in band-like distribution, periumbilical tenderness   Musculoskeletal:         General: No deformity.   Skin:     General: Skin is warm and dry.      Coloration: Skin is not jaundiced.   Neurological:      General: No focal deficit present.      Mental Status: She is alert.   Psychiatric:         Thought Content: Thought content normal.     Data   Recent Labs   Lab 11/09/20  2343 11/09/20  1749 11/09/20  1015 11/09/20  0741 11/09/20  0646 11/08/20  1634 11/08/20  1634 11/08/20  1309   WBC  --   --   --   --  10.0  --  7.6 8.4   HGB 9.1* 9.8* 10.0*  --  9.1*   < > 8.7* 10.1*   MCV  --   --   --   --  92  --  91 92   PLT  --   --   --   --  276  --  277 328   INR  --   --   --  1.05  --   --   --  0.89   NA  --   --   --  141 141  --   --  141   POTASSIUM  --    --   --  3.6 3.5  --   --  3.7   CHLORIDE  --   --   --  110* 109  --   --  108   CO2  --   --   --  26 25  --   --  26   BUN  --   --   --  5* 5*  --   --  9   CR  --   --   --  0.76 0.75  --   --  0.66   ANIONGAP  --   --   --  5 8  --   --  7   ELIZA  --   --   --  8.9 8.9  --   --  9.1   GLC  --   --   --  100* 128*  --   --  194*   ALBUMIN  --   --   --  3.2* 3.0*  --   --  3.7   PROTTOTAL  --   --   --  6.3* 6.1*  --   --  7.1   BILITOTAL  --   --   --  0.4 0.3  --   --  0.2   ALKPHOS  --   --   --  88 84  --   --  101   ALT  --   --   --  50 47  --   --  66*   AST  --   --   --  34 31  --   --  58*   TROPI  --   --   --  <0.015  --   --   --   --     < > = values in this interval not displayed.

## 2020-11-10 NOTE — PROGRESS NOTES
GASTROENTEROLOGY Progress Note      Date of Admission:  11/8/2020          ASSESSMENT AND RECOMMENDATIONS:   Assessment:  54 year old female with a history of chronic pancreatitis s/p total pancreatectomy and islet autotransplant (01/06/2012), DM I, GERD, impaired gastric emptying, and chronic abdominal pain who presents for evaluation due to nausea, vomiting and melena.    #Acuteonchronicanemia  #melena  #covid19  #chronic abdominal pain s/p total pancreatectomy  Patient continues with blood in stool this am, but rectal exam today reveals slightly lighter color (maroon). Hemoglobin remains stable (Last 5:  9.8 [0600], 9.1, 9.8, 10, 9.1). Appears to be blood from UGI source that has slowed or ceased as evidenced by hgb. Discussed plan for endoscopy with primary team.  -EGD tomorrow at 1805 in OR (d/t Covid 19 infection)  -NPO midnight  -Transition to PPI BID PO   -Continue to monitor stool amount, color, and character  -Continue to trend hgb as you are, tranfuse hgb<7 from GI standpoint  -Contact GI if large, overt GI bleeding and significant hemodynamic instability, will complete EGD immediately    Thank you for involving us in this patient's care. Please do not hesitate to contact the GI service with any questions or concerns.     Pt care plan discussed with Dr. Michelle, GI staff physician.    Jasvir Caldera, BEE CNP  [phone]  -------------------------------------------------------------------------------------------------------------------          Chief Complaint:   We were asked by Dr. Quinones of medicine to evaluate this patient with rectal bleeding and anemia    History is obtained from the patient and the medical record.          Interval History:   Chantell Kidd is a 54 year old female with a history of chronic pancreatitis s/p total pancreatectomy and islet autotransplant (01/06/2012), DM I, GERD, impaired gastric emptying, and chronic abdominal pain who presents for evaluation due to nausea, vomiting and  melena.    Update:  Reports no dizziness, lightheadedness, or symptoms with standing. Mild dyspnea with more pleuritic chest pain this am. She reports 2 small stools this am that appeared maroon in color. Tolerating clear liquids without nausea or vomiting. Hgb continues to remain stable.           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    TPIAT     Spasm of sphincter of Oddi             Past Surgical History:   Reviewed and edited as appropriate   Past Surgical History:   Procedure Laterality Date     ARTHROPLASTY CARPOMETACARPAL (THUMB JOINT)  5/2/2014    Procedure: ARTHROPLASTY CARPOMETACARPAL (THUMB JOINT);  Surgeon: Carina Panda MD;  Location: [facility]     CHOLECYSTECTOMY  2004     COLONOSCOPY  7/18/2014    Procedure: COLONOSCOPY;  Surgeon: Aurora Sahu MD;  [facility]     COLONOSCOPY N/A 8/1/2017    Procedure: COLONOSCOPY;  Colonoscopy and upper endoscopy;  Surgeon: Deirdre Harris MD;  [facility]     ENDOSCOPIC RETROGRADE CHOLANGIOPANCREATOGRAM       ENDOSCOPIC RETROGRADE CHOLANGIOPANCREATOGRAM  4/19/2011    Procedure:ENDOSCOPIC RETROGRADE CHOLANGIOPANCREATOGRAM; Pancreatic Stent Placement       ENDOSCOPIC RETROGRADE CHOLANGIOPANCREATOGRAM  5/26/2011    Procedure:ENDOSCOPIC RETROGRADE CHOLANGIOPANCREATOGRAM; with Pancreatic Stent Removal; Surgeon:DALE MIMS; Location:[facility]     ENDOSCOPY UPPER, COLONOSCOPY, COMBINED  4/25/2012    Procedure:COMBINED ENDOSCOPY UPPER, COLONOSCOPY; Enteroscopy with Bile Duct Stent  Removal, Colonoscopy  *[facility] Safe Room*; Surgeon:GRACY GODWIN; Location[facility]     ESOPHAGOSCOPY, GASTROSCOPY, DUODENOSCOPY (EGD), COMBINED  5/26/2011    Procedure:COMBINED ESOPHAGOSCOPY, GASTROSCOPY, DUODENOSCOPY (EGD); Surgeon:DALE MIMS; [facility]     ESOPHAGOSCOPY, GASTROSCOPY, DUODENOSCOPY (EGD), COMBINED N/A 10/30/2014    Procedure: COMBINED ESOPHAGOSCOPY, GASTROSCOPY, DUODENOSCOPY (EGD), BIOPSY SINGLE OR MULTIPLE;  Surgeon: Sarai Moon MD;  [facility]     ESOPHAGOSCOPY, GASTROSCOPY, DUODENOSCOPY (EGD), COMBINED Left 7/6/2015    Procedure: COMBINED ESOPHAGOSCOPY, GASTROSCOPY, DUODENOSCOPY (EGD), BIOPSY SINGLE OR MULTIPLE;  Surgeon: Thomas Estrada MD;  [facility]     ESOPHAGOSCOPY, GASTROSCOPY, DUODENOSCOPY (EGD), COMBINED N/A 7/8/2016    Procedure: COMBINED ESOPHAGOSCOPY, GASTROSCOPY, DUODENOSCOPY (EGD), BIOPSY SINGLE OR MULTIPLE;  Surgeon: Eloy Klein MD;  [facility]     ESOPHAGOSCOPY, GASTROSCOPY, DUODENOSCOPY (EGD), COMBINED N/A 8/4/2016    Procedure: COMBINED ESOPHAGOSCOPY, GASTROSCOPY, DUODENOSCOPY (EGD), BIOPSY SINGLE OR MULTIPLE;  Surgeon: Jason Brown MD;  [facility]     ESOPHAGOSCOPY, GASTROSCOPY, DUODENOSCOPY (EGD), COMBINED N/A 8/1/2017    Procedure: COMBINED ESOPHAGOSCOPY, GASTROSCOPY, DUODENOSCOPY (EGD);;  Surgeon: Deirdre Harris MD;  [facility]     ESOPHAGOSCOPY, GASTROSCOPY, DUODENOSCOPY (EGD), COMBINED N/A 6/12/2019    Procedure: ESOPHAGOGASTRODUODENOSCOPY (EGD);  Surgeon: Jose Francisco Perdomo MD;  Location:  GI     GYN SURGERY      Hysterectomy and USO     [facility] ENDOSCOPY W EUS  7/20/2011    Procedure:COMBINED ENDOSCOPIC ULTRASOUND, ESOPHAGOSCOPY, GASTROSCOPY, DUODENOSCOPY (EGD); Surgeon:DARVIN DONOHUE; [facility]     HERNIORRHAPHY VENTRAL N/A 9/15/2016    Procedure: HERNIORRHAPHY VENTRAL;  Surgeon: Juanita Bernabe MD;  [facility]     HYSTERECTOMY  1997 or  1998    USO     INCISION AND DRAINAGE ABDOMEN WASHOUT, COMBINED  8/16/2012    Procedure: COMBINED INCISION AND DRAINAGE ABDOMEN WASHOUT;  ,debridement and Drainage Post Appendectomy;  Surgeon: Ron Austin MD;  [facility]     INJECT TRANSVERSUS ABDOMINIS PLANE (TAP) BLOCK BILATERAL Bilateral 5/26/2016    Procedure: INJECT TRANSVERSUS ABDOMINIS PLANE (TAP) BLOCK BILATERAL;  Surgeon: Leonard Mccallum MD;  [facility]     LAPAROSCOPIC APPENDECTOMY  7/30/2012    Procedure: LAPAROSCOPIC APPENDECTOMY;  Open Appendectomy;  Surgeon: Ron Austin MD;  [facility]     PANCREATECTOMY, TRANSPLANT AUTO ISLET CELL, COMBINED  1/6/2012    Procedure:COMBINED PANCREATECTOMY, TRANSPLANT AUTO ISLET CELL; Total  Pancreatectomy, Auto Islet Transplant, splenectomy, 18fr. transgastric-jejunal feeding tube placement, liver biopsy; Surgeon:PALAK LEE; [facility]     REPLACE GASTROSTOMY TUBE, PERCUTANEOUS N/A 8/30/2017    Procedure: REPLACE GASTROSTOMY TUBE, PERCUTANEOUS;  GJ Tube Change;  Surgeon: Jose Nath PA-C;  [facility]     SPLENECTOMY              Previous Endoscopy:     EGD 8/1/2017    Upper GI Endoscopy 06/12/2019  9:56 AM [address] [place] [address] [phone]     Endoscopy Department   _______________________________________________________________________________   Patient Name: Chantell Kidd               Procedure Date: 6/12/2019 9:56 AM   MRN: [id number]                       Account Number: [id number]   YOB: 1965             Admit Type: Inpatient   Age: 53                               Room:  #3   Gender: Female                        Note Status: Finalized   Attending MD: Jose Francisco Perdomo MD   Total Sedation Time:   _______________________________________________________________________________       Procedure:           Upper GI endoscopy   Indications:         Abdominal pain, Iron deficiency  anemia, Dysphagia   Providers:           Jose Francisco Perdomo MD, Ruth Cobb RN   Referring MD:           Medicines:           Fentanyl 50 micrograms IV, Midazolam 1 mg IV, Lidocaine                        spray   Complications:       No immediate complications.   _______________________________________________________________________________   Procedure:           Pre-Anesthesia Assessment:                        - Prior to the procedure, a History and Physical was                        performed, and patient medications and allergies were                        reviewed. The patient is competent. The risks and                        benefits of the procedure and the sedation options and                        risks were discussed with the patient. All questions                        were answered and informed consent was obtained. Patient                        identification and proposed procedure were verified by                        the physician and the nurse in the procedure room.                        Mental Status Examination: alert and oriented. Airway                        Examination: normal oropharyngeal airway and neck                        mobility. Respiratory Examination: clear to                        auscultation. CV Examination: normal. Prophylactic                        Antibiotics: The patient does not require prophylactic                        antibiotics. Prior Anticoagulants: The patient has taken                        no previous anticoagulant or antiplatelet agents. ASA                        Grade Assessment: III - A patient with severe systemic                        disease. After reviewing the risks and benefits, the                        patient was deemed in satisfactory condition to undergo                        the procedure. The anesthesia plan was to use moderate                        sedation / analgesia (conscious sedation). Immediately                         prior to administration of medications, the patient was                        re-assessed for adequacy to receive sedatives. The heart                        rate, respiratory rate, oxygen saturations, blood                        pressure, adequacy of pulmonary ventilation, and                        response to care were monitored throughout the                        procedure. The physical status of the patient was                        re-assessed after the procedure.                        After obtaining informed consent, the endoscope was                        passed under direct vision. Throughout the procedure,                        the patient's blood pressure, pulse, and oxygen                        saturations were monitored continuously. The Endoscope                        was introduced through the mouth, and advanced to the                        pylorus. The upper GI endoscopy was accomplished without                        difficulty. The patient tolerated the procedure well.                                                                                     Findings:        The examined esophagus was normal.        The Z-line was irregular and was found 39 cm from the incisors.        Patchy mildly erythematous mucosa without bleeding was found at the        gastroesophageal junction.        A severe stenosis was found at the pylorus. This was non-traversed.        The exam was otherwise without abnormality.                                                                                     Moderate Sedation:        Moderate (conscious) sedation was administered by the endoscopy nurse        and supervised by the endoscopist. The following parameters were        monitored: oxygen saturation, heart rate, blood pressure, and response        to care. Total physician intraservice time was 10 minutes.   Impression:          53F presenting with central abdominal pain with a PMH of                         SHASHANK (1/2012 w/ Dr. Alves) who was admitted with                        reports of dysphagia, abdominal pain, reported melena                        and coffee ground emesis with 2gram drop in hemoglobin                        concerning for possible GI bleeding. Upper endoscopy                        notable for severe pyloric stenosis with 2mm os, unable                        to traverse. Otherwise notable for mild erythema at the                        GE junction and irregular z-line at 39cm fro the                        incisors. Otherwise normal exam. Solid food dysphagia                        related to evere pyloric stenosis, no clear etiology of                        reported melena identified on exam, suspect possible                        PUD, however unable to examine duodenum.   Recommendation:      - Return patient to [facility] for ongoing care.                        - Full liquid diet today.                        - Continue present medications.                        - Use a proton pump inhibitor PO BID for 8 weeks then                        once daily thereafter                        - Consider UGI series with SBFT                        - Likely repeat EGD with possible dialtion and/or stent                        pending UGI series                        - Additional recommendations per primary GI service            Results for orders placed or performed during the hospital encounter of 08/01/17   COLONOSCOPY   Result Value Ref Range    COLONOSCOPY       [facility], [address] [place] [address] [phone]     Endoscopy Department  _______________________________________________________________________________  Patient Name: Chantell Kidd               Procedure Date: 8/1/2017 7:56 AM  MRN: [id number]                       Account Number: [id number]  YOB: 1965             Admit Type: Outpatient  Age: 51                                Room: Spec. Proc.  Gender: Female                        Note Status: Finalized  Attending MD: Deirdre Harris MD  Total Sedation Time:   _______________________________________________________________________________     Procedure:           Colonoscopy  Indications:         Rectal bleeding  Providers:           Deirdre Harris MD, Yuliet Marie, CARLOS  Patient Profile:     Ms. Kidd is a 49 year old female with prior idiopathic                        chronic pancreatitis s/p EUS and multiple ERCPs with                        eventual total pancreatectomy with a uto-islet cell                        transplant in 1/2012, with ongoing chronic abdominal                        pain (with a negative evaluation including prior CTs,                        MRCPs, US abd, EGDs and colonoscopies), nausea/vomiting                        (now improved), and iron deficiency anemia. She is                        referred for colonoscopy for BRBPR.  Referring MD:        Thomas Estrada MD  Medicines:           Monitored Anesthesia Care  Complications:       No immediate complications.  _______________________________________________________________________________  Procedure:           Pre-Anesthesia Assessment:                       - See the other procedure note for documentation of the                        pre-procedure assessment.                       - See separate Anesthesia note for pre-anesthesia                        assessment.                       After obtaining informed consent, the colonoscope was                        passed under dir ect vision. Throughout the procedure,                        the patient's blood pressure, pulse, and oxygen                        saturations were monitored continuously. The Colonoscope                        was introduced through the anus and advanced to the                        terminal ileum. The colonoscopy was performed without                         difficulty. The patient tolerated the procedure well.                        The quality of the bowel preparation was evaluated using                        the BBPS ([place] Bowel Preparation Scale) with scores                        of: Right Colon = 1 (portion of mucosa seen, but other                        areas not well seen due to staining, residual stool                        and/or opaque liquid), Transverse Colon = 2 (minor                        amount of residual staining, small fragments of stool                        and/or opaque liquid, but mucosa seen well) and Left                        Colon = 2 (minor amoun t of residual staining, small                        fragments of stool and/or opaque liquid, but mucosa seen                        well). The total BBPS score equals 5. The quality of the                        bowel preparation was fair to good, but with extensive                        lavage and cleaning the prep was felt to be good.                                                                                   Findings:       The perianal exam findings include skin tags.       The digital rectal exam was normal.       The terminal ileum appeared normal.       Semi-liquid stool was seen in the right and mid-colon. Some solid        stool/vegetable material was found in the transverse colon, making        visualization difficult. Extensive lavage of the entire colon was        performed (>1500 mL) resulting in clearance with adequate visualization.        The solid pieces of stool had to be moved with the scope to visualize        underneath.       The exam was otherwise without  abnormality on direct and retroflexion        views.                                                                                   Impression:          No source of bleeding found on exam and no signs of                        active or recent bleeding. Noted skin tags which may                         suggest prior hemorrhoids and irregular movements which                        can predispose to fissure.                       - Preparation of the colon was fair, but after lavage                        was felt to be good.                       - Perianal skin tags found on perianal exam.                       - The examined portion of the ileum was normal.                       - Some small solid stool in the transverse colon which                        was moved to facilitate visualization.                       - The examination was otherwise normal on direct and                        retroflexion views.                       - No specimens collected.  Recommendation:      -  Discharge patient to home.                       - Resume previous diet.                       - Return to referring physician.                       - Repeat colonoscopy for screening purposes in 10 years                        (unless there is new information regarding family                        history which would prompt earlier exam).                       - Future colonoscopies should be done with a 1.5 - 2 day                        prep.                                                                                     Deirdre Harris MD  _______________________  Deirdre Harris MD  8/1/2017 12:04:46 PM  I was physically present for the entire viewing portion of the exam.  __________________________  Signature of teaching physician  [id number] Harris MD  Number of Addenda: 0    Note Initiated On: 8/1/2017 7:56 AM  Scope In:  Scope Out:              Social History:   Reviewed and edited as appropriate  Social History     Socioeconomic History     Marital status:      Spouse name: Not on file     Number of children: Not on file     Years of education: Not on file     Highest education level: Not on file   Occupational History     Not on file   Social Needs     Financial resource strain: Not on file      Food insecurity     Worry: Not on file     Inability: Not on file     Transportation needs     Medical: Not on file     Non-medical: Not on file   Tobacco Use     Smoking status: Never Smoker     Smokeless tobacco: Never Used   Substance and Sexual Activity     Alcohol use: No     Alcohol/week: 0.0 standard drinks     Drug use: No     Sexual activity: Yes   Lifestyle     Physical activity     Days per week: Not on file     Minutes per session: Not on file     Stress: Not on file   Relationships     Social connections     Talks on phone: Not on file     Gets together: Not on file     Attends Judaism service: Not on file     Active member of club or organization: Not on file     Attends meetings of clubs or organizations: Not on file     Relationship status: Not on file     Intimate partner violence     Fear of current or ex partner: Not on file     Emotionally abused: Not on file     Physically abused: Not on file     Forced sexual activity: Not on file   Other Topics Concern     Parent/sibling w/ CABG, MI or angioplasty before 65F 55M? Not Asked   Social History Narrative     Not on file            Family History:   Reviewed and edited as appropriate  Family History   Problem Relation Age of Onset     Hypertension Mother      Diabetes Mother      Osteoporosis Mother      Cancer Father         pancreatic cancer     Diabetes Maternal Grandmother      Cardiovascular Maternal Grandmother      Cancer Maternal Grandfather         lung cancer     Cancer Sister         brain     Cancer Sister         liver cancer            Allergies:   Reviewed and edited as appropriate     Allergies   Allergen Reactions     Corticosteroids Other (See Comments)     All oral, IV and injectable steroids are contraindicated (unless in life threatening situations) in Islet Auto transplant recipients. They can cause irreversible loss of islet cell function. Please contact patient's transplant care coordinator YURI Cortez RN at  [phone]/pager [phone] and/or endocrinologist prior to administration.       Chocolate Flavor Rash     Breaks out when eats chocolate            Medications:     Current Facility-Administered Medications   Medication     acetaminophen (TYLENOL) tablet 975 mg     glucose gel 15-30 g    Or     dextrose 50 % injection 25-50 mL    Or     glucagon injection 1 mg     DULoxetine (CYMBALTA) DR capsule 90 mg     hydrocortisone sodium succinate PF (solu-CORTEF) injection 25 mg     HYDROmorphone (DILAUDID) injection 0.2-0.4 mg     HYDROmorphone (DILAUDID) tablet 2-4 mg     insulin 1 unit/1mL in saline (NovoLIN-Regular) infusion- SOT TPIAT algorithm     insulin aspart (NovoLOG/FIASP) 100 UNIT/ML VIAL FOR FILLING PUMP RESERVOIR     insulin aspart (NovoLOG/FIASP) 100 UNIT/ML VIAL FOR FILLING PUMP RESERVOIR     insulin basal rate from AMBULATORY PUMP     insulin bolus from AMBULATORY PUMP     insulin bolus from AMBULATORY PUMP     insulin bolus from AMBULATORY PUMP     lactated ringers infusion     levothyroxine (SYNTHROID/LEVOTHROID) tablet 112 mcg     Lidocaine (LIDOCARE) 4 % Patch 1 patch    And     lidocaine patch in PLACE     lidocaine (LMX4) cream     lidocaine 1 % 0.1-1 mL     Medication instructions: Do NOT use nebulized medications     melatonin tablet 1 mg     menthol (ICY HOT) 5 % patch 1 patch    And     menthol (ICY HOT) Patch in Place     metoclopramide (REGLAN) tablet 10 mg     naloxone (NARCAN) injection 0.1-0.4 mg     ondansetron (ZOFRAN) tablet 4-8 mg     pantoprazole (PROTONIX) 2 mg/mL suspension 40 mg     polyethylene glycol (MIRALAX) Packet 17 g     senna-docusate (SENOKOT-S/PERICOLACE) 8.6-50 MG per tablet 1 tablet    Or     senna-docusate (SENOKOT-S/PERICOLACE) 8.6-50 MG per tablet 2 tablet     sodium chloride (PF) 0.9% PF flush 3 mL     sodium chloride (PF) 0.9% PF flush 3 mL     topiramate (TOPAMAX) tablet 100 mg     traZODone (DESYREL) tablet 100-200 mg             Review of Systems:     A  "complete review of systems was performed and is negative except as noted in the HPI           Physical Exam:   /68 (BP Location: Right arm)   Pulse 77   Temp 97.6  F (36.4  C) (Oral)   Resp 16   Ht 1.575 m (5' 2\")   Wt 67.5 kg (148 lb 13 oz)   SpO2 97%   BMI 27.22 kg/m    Wt:   Wt Readings from Last 2 Encounters:   11/08/20 67.5 kg (148 lb 13 oz)   11/21/19 67.2 kg (148 lb 3.2 oz)      Constitutional: cooperative, pleasant, not dyspneic/diaphoretic, no acute distress  Eyes: Sclera anicteric/injected  Ears/nose/mouth/throat: Mucus membranes dry, hearing intact  Neck: supple without obvious mass  CV: No edema  Respiratory: Unlabored breathing on RA, SpO2 96%  Abd: Nondistended, +bs, upper quadrants tender, no peritoneal signs  Skin: warm, perfused, no jaundice  Neuro: AAO x 3  Psych: Normal affect  MSK: No gross deformities  Rectal: no mass, external hemorrhoids, fissure, or massappreciated. Maroon colored stool on digital exam. RN chaperone present for exam.          Data:   Labs and imaging below were independently reviewed and interpreted    BMP  Recent Labs   Lab 11/11/20  0717 11/10/20  0608 11/09/20  0741 11/09/20  0646    139 141 141   POTASSIUM 3.4 3.5 3.6 3.5   CHLORIDE 107 108 110* 109   ELIZA PENDING 8.6 8.9 8.9   CO2 PENDING 24 26 25   BUN PENDING 6* 5* 5*   CR PENDING 0.78 0.76 0.75   GLC PENDING 145* 100* 128*     CBC  Recent Labs   Lab 11/11/20  0717 11/10/20  0608 11/10/20  0608 11/09/20  0646 11/09/20  0646 11/08/20  1634 11/08/20  1634   WBC 8.6  --  8.2  --  10.0  --  7.6   RBC 3.08*  --  3.18*  --  2.96*  --  2.88*   HGB 9.5*   < > 9.8*   < > 9.1*   < > 8.7*   HCT 28.0*  --  28.6*  --  27.3*  --  26.3*   MCV 91  --  90  --  92  --  91   MCH 30.8  --  30.8  --  30.7  --  30.2   MCHC 33.9  --  34.3  --  33.3  --  33.1   RDW 13.5  --  13.3  --  13.7  --  13.4     --  299  --  276  --  277    < > = values in this interval not displayed.     INR  Recent Labs   Lab 11/11/20  0717 " 11/10/20  0608 11/09/20  0741 11/08/20  1309   INR 1.08 1.04 1.05 0.89     LFTs  Recent Labs   Lab 11/09/20  0741 11/09/20  0646 11/08/20  1309   ALKPHOS 88 84 101   AST 34 31 58*   ALT 50 47 66*   BILITOTAL 0.4 0.3 0.2   PROTTOTAL 6.3* 6.1* 7.1   ALBUMIN 3.2* 3.0* 3.7      PANCNo lab results found in last 7 days.           Examination:  XR UPPER GI W SMALL BOWEL FOLLOW THROUGH 6/13/2019 11:20  AM      Comparison: EGD 6/12/2019, CT abdomen and pelvis 6/10/2019, upper GI  11/17/2016     History: Pt w/ recent EGD (for melena) showing pyloric stenosis.     Fluoroscopy time: 1.5 minutes.     Technique: Double contrast modified upper GI.     Findings:  A double contrast modified upper GI was performed. Prompt  contrast passage from the esophagus into the stomach. No esophageal  filling defects. The rugal pattern is within normal limits. The  visualized proximal small bowel is within normal limits.                                                                       Impression: In this patient with a pylorus sparing  pancreaticoduodenectomy with duodedenal-duodenal anastomosis, no  definite stricture identified.     I have personally reviewed the examination and initial interpretation  and I agree with the findings.    Imaging:    CT AP 11/8/2020    IMPRESSION:   1. Increased colonic wall thickening in the rectum, sigmoid and  descending colon, suggestive of colitis although, since the coon is  decompressed, this could be artifactual.  2. Stable extensive postsurgical changes in the abdomen. Stable  pneumobilia.     I have personally reviewed the examination and initial interpretation  and I agree with the findings.     BERONICA ARGUETA MD

## 2020-11-10 NOTE — PROGRESS NOTES
[facility]     Progress Note - Maroon 2 Service        Date of Admission:  11/8/2020    Changes Today  - Planning for EGD tomorrow per GI, appreciate recommendations  - CLD until midnight then NPO for anticipated procedure  - Modifications to pain regimen including transition to scheduled APAP, transition from PO oxycodone to PO dilaudid with IV available, start icy hot patch  - Trend Hgb q12H    Assessment & Plan       Chantell Kidd is a 54 year old F with PMHx of TPIAT (2012), nomi-en-y gastric bypass, possible adrenal insufficiency on chronic steroids, osteoporosis on bisphosphonates, migraine, and MDD/anxiety admitted with acute blood loss anemia suspected 2/2 UGIB.     # Acute blood loss anemia suspected 2/2 UGIB    # Chronic anemia, baseline Hgb ~11.5  Patient presented with melena and hematochezia with Hgb drop from 10.1 to 8.7 suggestive of acute blood loss anemia 2/2 UGIB in setting of chronic steroid use, prior candida esophagitis, history of nomi-en-y bypass. BUN: Cr ratio not suggestive of upper GI bleed. Hemodynamically stable. Suspected causes: possible PUD vs gastritis vs esophagitis vs marginal ulcer in setting of nomi-en-y. No history of liver disease or varices and will not start octreotide. CT AP obtained with evidence of possible colitis without evidence of diverticular disease. Will trend Hgb, treat with IV PPI, pain control as below.  - Maintain Two PIVs  - IVF resuscitation with LR at 100 mL/hr to maintain MAP>65  - Transfuse PRN to goal Hgb<7, consent obtained   - H&H monitoring q12h   - Documentation of stool output, quantity, color  - Continue pantoprazole 80 mg gtt, s/p 80 mg IV x1 in ED  - Avoid NSAIDs, hold ASA 81 mg  - Will continue steroids in this patient with concern for precipitation of adrenal insufficieny  - GI consulted, appreciate assistance  - CLD as no EGD anticipated today. EGD on Wednesday, 11/11.   - Pain: APAP 1000 mg Q8H,   dilaudid 2-4 mg PO Q4H PRN with 0.2-0.4 mg IV available for breakthrough    # History of adrenal insufficiency  Followed by Dr. Maher. Previously suppressed AM cortisol and has been on empiric therapy for possible adrenal insufficiency, unclear if central vs transient. Most recent clinic note describes inability to wean steroids due to hypoglycemia episodes.   - Hydrocortisone at 25 mg IV Q12H for possible adrenal insufficiency, dosed IV with concern for ability to tolerate PO  - Discussed with endocrinology on call. If concern for adrenal crisis will increase hydrocortisone to 50 mg q8h for ~two days, then reduce to 25 mg q12h, then resume PTA dosing. Will defer this for now with continued clinical stability.    # Post-pancreatectomy diabetes  # TPIAT (2012)  BG lability with hypoglycemia following admission. Endocrinology consulted for assistance.   - Endocrinology consulted, appreciate recommendations   -BG q2h while NPO.  Return to q4h monitoring after BG stable >120 for 4 hours   -PRN IV insulin infusion (SOT protocol), to be started if BG is >200 for 4 hours with usual (0.5) basal insulin infusing via pump.  If IV insulin is started, ambulatory insulin pump to be suspended.   -Stop ambulatory pump therapy for any change in mental status, declining condition, or prolonged anesthesia  - Hypoglycemia protocol    # COVID positive  Limited symptoms including myalgias, fatigue, limited shortness of breath without desaturation.   - Lab monitoring per COVID order set  - No prophylactic anticoagulation in setting of active bleed    # Chest pain  EKG without ST elevations or T wave inversions. No evidence of right heart strain. Troponin on AM draws for COVID monitoring negative. Primarily induced with cough, speech, some mild pleuritic component. Is at increased risk for DVT/PE in setting of COVID, however, only mild nature of symptoms currently without tachycardia or desaturation.   - Low threshold to CT PE with  progression of symptoms    Chronic Medical Conditions  # Dysfunctional gastric emptying   - Continue PTA metoclopramide  # MDD/anxiety  - Continue PTA duloxetine, trazodone  # Hypothyroid  - Continue PTA levothyroxine       Diet: Clear Liquid Diet  NPO at MN  Fluids: LR at 100/hr  Lines: PIV  DVT Prophylaxis: Pneumatic Compression Devices  Mckee Catheter: not present  Code Status: Full Code           Disposition Plan   Expected discharge: 2 - 3 days, recommended to prior living arrangement once adequate pain management/ tolerating PO medications and hemoglobin stable.  Entered: Anne Marie Majano MD 11/10/2020, 6:31 AM       The patient's care was discussed with the Attending Physician, Dr. Ly.    Anne Marie Majano MD  [address]   Please see sign in/sign out for up to date coverage information  ______________________________________________________________________    Interval History   Nursing notes reviewed. BG low requiring D50. Continues to have abdominal pain. Continued maroon and melenic stool. Hgb stable. No hemodynamic changes. GI following for possible EGD, anticipated tomorrow morning. Continued myalgias, particularly back pain.    Data reviewed today: I reviewed all medications, new labs and imaging results over the last 24 hours. I personally reviewed no images or EKG's today.    Physical Exam   Vital Signs: Temp: 97  F (36.1  C) Temp src: Oral BP: (!) 144/69 Pulse: 72   Resp: 14 SpO2: 95 % O2 Device: None (Room air)    Weight: 148 lbs 12.97 oz  Physical Exam unchanged  Constitutional:       General: She is not in acute distress.     Appearance: Normal appearance. She is not ill-appearing.   HENT:      Head: Normocephalic and atraumatic.      Mouth/Throat:      Mouth: Mucous membranes are moist.   Eyes:      General: No scleral icterus.     Extraocular Movements: Extraocular movements intact.   Cardiovascular:       Rate and Rhythm: Normal rate.      Pulses: Normal pulses.      Heart sounds: Normal heart sounds. No murmur. No gallop.    Pulmonary:      Effort: Pulmonary effort is normal. No respiratory distress.      Breath sounds: No wheezing.   Abdominal:      General: Abdomen is flat.      Palpations: Abdomen is soft.      Tenderness: There is abdominal tenderness. There is no guarding or rebound.      Comments: Abdominal scarring that is well-healed without erythema. TTP inferior to costal margin in band-like distribution, periumbilical tenderness   Musculoskeletal:         General: No deformity.   Skin:     General: Skin is warm and dry.      Coloration: Skin is not jaundiced.   Neurological:      General: No focal deficit present.      Mental Status: She is alert.   Psychiatric:         Thought Content: Thought content normal.     Data   Recent Labs   Lab 11/09/20  2343 11/09/20  1749 11/09/20  1015 11/09/20  0741 11/09/20  0646 11/08/20  1634 11/08/20  1634 11/08/20  1309   WBC  --   --   --   --  10.0  --  7.6 8.4   HGB 9.1* 9.8* 10.0*  --  9.1*   < > 8.7* 10.1*   MCV  --   --   --   --  92  --  91 92   PLT  --   --   --   --  276  --  277 328   INR  --   --   --  1.05  --   --   --  0.89   NA  --   --   --  141 141  --   --  141   POTASSIUM  --   --   --  3.6 3.5  --   --  3.7   CHLORIDE  --   --   --  110* 109  --   --  108   CO2  --   --   --  26 25  --   --  26   BUN  --   --   --  5* 5*  --   --  9   CR  --   --   --  0.76 0.75  --   --  0.66   ANIONGAP  --   --   --  5 8  --   --  7   ELIZA  --   --   --  8.9 8.9  --   --  9.1   GLC  --   --   --  100* 128*  --   --  194*   ALBUMIN  --   --   --  3.2* 3.0*  --   --  3.7   PROTTOTAL  --   --   --  6.3* 6.1*  --   --  7.1   BILITOTAL  --   --   --  0.4 0.3  --   --  0.2   ALKPHOS  --   --   --  88 84  --   --  101   ALT  --   --   --  50 47  --   --  66*   AST  --   --   --  34 31  --   --  58*   TROPI  --   --   --  <0.015  --   --   --   --     < > = values in this  interval not displayed.

## 2020-11-10 NOTE — PROGRESS NOTES
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         Hypoglycemia to 60s early AM despite 50% basal, in setting of continued low intake.  HS bolus excessive, could have contributed to BG downtrend.    -continue Medtronic 670 g in manual mode    --While PO intake is low, infusing temporary basal rate 50% (0.25 units/h) from 4539-4313.  Basal rate to be suspended from 7768-2091 (during sleep).  ** during the day, if glucose less than 60, suspend pump, treat per hypoglycemia protocol.  Resume 0.25 units/h after glucose >120 x 2.    --bolus using calculator in pump: 1 per 100 mg/dL glucose for BG >140 and carb cover 1 per 40 grams    -BG q4h, or change to qAC, HS 0200 if eating becomes more significant    -CGMS to be resumed promptly after resuming home    -PRN IV insulin available in case of development of persistent hyperglycemia >200     Pt will communicate with Dr. Maher via Mychart/email about BG trends/dosing adjustments after discharge.  Unlikely to need to increase to usual basal until PO increases significantly         Plan discussed with patient, bedside RN, and page to primary team.           Interval History:     The last 24 hours progress and nursing notes reviewed.  Chantell has ongoing back pain- main complaint.  She expresses concern with RN understanding the correction bolus last evening.  She is correct that she should input glucose value and give bolus as calculated  "by pump.  Her 1800 bolus should have been around 0.5 units and 2200 bolus around 0.03 units rather than the 1 unit doses manually delivered.    Chantell tried only water.  Had some nausea.  Might try broth today.  Does not expect her calorie intake to improve much.  She is anticipating discharge home tomorrow, and states would put her sensor tomorrow ASAP.  Other documentation suggests discharge today is a possibility.    This new timeline due to inability to schedule her EGD in the OR.--> this afternoon, notes reflect updated plan for EGD in OR tomorrow evening at 1800.  She's been essentially NPO the last two days, so no further change to basal insulin plan at this time.    Recent Labs   Lab 11/10/20  1151 11/10/20  1031 11/10/20  0856 11/10/20  0637 11/10/20  0608 11/10/20  0511 11/10/20  0430 11/09/20  0741 11/09/20  0741 11/09/20  0646 11/08/20  1309 11/08/20  1309   GLC  --   --   --   --  145*  --   --   --  100* 128*  --  194*   BGM 95 92 116* 127*  --  169* 66*   < >  --   --    < >  --     < > = values in this interval not displayed.           Nutrition:     Orders Placed This Encounter      Clear Liquid Diet            PTA Regimen:   Medtronic 670G, with Guardian sensor- manual mode   Basal- 0.5 unit/hour  (versus 0.675 last April)  Bolus-    I:C ratio 40g,    mg/dL  target             Review of Systems:   See interval hx          Medications:   HC 25 mg q12h  (home cortef 10 mg BID)         Physical Exam:     Gen: Alert, in NAD, resting in bed  HEENT:  hearing intact to conversational volume  Resp: Unlabored, no cough observed  Neuro: oriented x3, communicating clearly  Psych: calm even mood  BP (!) 146/75 (BP Location: Right arm)   Pulse 81   Temp 97  F (36.1  C) (Oral)   Resp 16   Ht 1.575 m (5' 2\")   Wt 67.5 kg (148 lb 13 oz)   SpO2 95%   BMI 27.22 kg/m               Data:     Lab Results   Component Value Date    A1C 7.3 11/09/2020    A1C 6.7 11/21/2019    A1C 8.2 06/11/2019    A1C " Canceled, Test credited 06/10/2019    A1C 9.6 04/18/2019              CBC RESULTS:   Recent Labs   Lab Test 11/10/20  0608   WBC 8.2   RBC 3.18*   HGB 9.8*   HCT 28.6*   MCV 90   MCH 30.8   MCHC 34.3   RDW 13.3        Recent Labs   Lab Test 11/10/20  0608 11/09/20  0741    141   POTASSIUM 3.5 3.6   CHLORIDE 108 110*   CO2 24 26   ANIONGAP 6 5   * 100*   BUN 6* 5*   CR 0.78 0.76   ELIZA 8.6 8.9     Liver Function Studies -   Recent Labs   Lab Test 11/09/20  0741   PROTTOTAL 6.3*   ALBUMIN 3.2*   BILITOTAL 0.4   ALKPHOS 88   AST 34   ALT 50     Lab Results   Component Value Date    INR 1.04 11/10/2020    INR 1.05 11/09/2020    INR 0.89 11/08/2020    INR 0.99 06/10/2019    INR 1.04 11/15/2016         Margot Vargas APRN [facility] [phone]  Diabetes Management job code [id number]

## 2020-11-10 NOTE — PLAN OF CARE
"Time: 2568-0147   Reason for admission/Dx:   Gastrointestinal hemorrhage, unspecified gastrointestinal hemorrhage type [K92.2]    [Vitals]: BP (!) 146/75 (BP Location: Right arm)   Pulse 81   Temp 97  F (36.1  C) (Oral)   Resp 16   Ht 1.575 m (5' 2\")   Wt 67.5 kg (148 lb 13 oz)   SpO2 95%   BMI 27.22 kg/m      [Pain/PRN/s]: Abd, back, and epigastric/chest pain managed with PRN IV dilaudid. And PO PRN tylenol. Pt also encouraged to reposition     [Respiratory]: WDL    [Cardiac]: Pt mildly hypertensive    [Neuros]: WDL    [GI]:Orders Placed This Encounter      NPO per Anesthesia Guidelines for Procedure/Surgery Except for: Meds  Per pt report, she is still having maroon stools    []: WDL    [Imaging/Procedures]:Plan for EGD tomorrow  [Skin/Wounds]: WDL    [Lines]:    Peripheral IV 11/08/20 Right Wrist (Active)   Site Assessment WDL 11/10/20 1000   Line Status Infusing 11/10/20 1000   Phlebitis Scale 0-->no symptoms 11/10/20 1000   Infiltration Scale 0 11/10/20 1000   Infiltration Site Treatment Method  None 11/10/20 1000   If infiltrated, was a Vesicant infusing? No 11/10/20 1000   Number of days: 2         [Infusions]: LR infusing @ 100 ml/hr. Protonix infusing @ 8 mg/hr    [Activity]: Pt up w/SBA and independently. Denied dizziness this shift.  [Labs/Lactic]:   Hemoglobin (g/dL)   Date Value   11/10/2020 9.8 (L)     Creatinine (mg/dL)   Date Value   11/10/2020 0.78     Platelet Count (10e9/L)   Date Value   11/10/2020 299     Hematocrit (%)   Date Value   11/10/2020 28.6 (L)     WBC (10e9/L)   Date Value   11/10/2020 8.2      Lactic Acid (mmol/L)   Date Value   11/08/2020 1.7   06/13/2019 0.8       [BG]:   Glucose Values Bedside Glucose (mg/dl )  GLUCOSE   Latest Ref Rng & Units - 70 - 99 mg/dL   11/10/2020 -- 95   11/10/2020 -- 92   11/10/2020 -- 116(H)   Some recent data might be hidden   Pt on home ambulatory insulin gtt @ 0.25 units/hr. New continuous protocol in place. And new Bolus orders.     " [Electrolytes]:   Potassium (mmol/L)   Date Value   11/10/2020 3.5     Magnesium (mg/dL)   Date Value   11/21/2019 2.2     Phosphorus (mg/dL)   Date Value   11/21/2019 4.1     Sodium   Date Value Ref Range Status   11/10/2020 139 133 - 144 mmol/L Final             Plan: Plan for EGD @ 6:30 PM tomorrow in OR. Nursing will continue to monitor and follow POC.

## 2020-11-10 NOTE — PLAN OF CARE
Shift time: 2321-6787.    Neuro: alert and oriented x4. Dizzy when standing.  VS: HTN (146/69) otherwise VSS on RA  Pain: c/o pain in chest (MD aware), and pain in back.   Diet: clear liquids, no appetite overnight.   Act: Ind/SBA  Lines: PIV infusing Protonix and LR  Skin: visible skin WNL  Cardiovascular: Pt c/o chest pain that is greater with inhalation. MD came to bedside to assess. EKG ordered.   Resp: c/o SOB/BONNER. On RA. Productive cough.   GI/: c/o nausea overnight, PO zofran ordered and given with some relief. 1BM (maroon in color), loose. Voiding WNL.     New this shift: Pt blood sugar checks q2. Blood sugar 131, 88, 66 (given 25ml of IV dextrose per hypoglycemia protocol) Pt clear liquids and feeling nauseated. Blood sugar recheck in 15 min was 169.   Basal rate continues at 0.25. Will monitor.     Potential discharge today, EGD likely  outpatient for Wednesday.

## 2020-11-11 ENCOUNTER — TELEPHONE (OUTPATIENT)
Dept: GASTROENTEROLOGY | Facility: CLINIC | Age: 55
End: 2020-11-11

## 2020-11-11 LAB
ANION GAP SERPL CALCULATED.3IONS-SCNC: 8 MMOL/L (ref 3–14)
BASOPHILS # BLD AUTO: 0 10E9/L (ref 0–0.2)
BASOPHILS NFR BLD AUTO: 0.3 %
BUN SERPL-MCNC: 9 MG/DL (ref 7–30)
CALCIUM SERPL-MCNC: 8.5 MG/DL (ref 8.5–10.1)
CHLORIDE SERPL-SCNC: 107 MMOL/L (ref 94–109)
CO2 SERPL-SCNC: 23 MMOL/L (ref 20–32)
CREAT SERPL-MCNC: 0.82 MG/DL (ref 0.52–1.04)
CRP SERPL-MCNC: <2.9 MG/L (ref 0–8)
D DIMER PPP FEU-MCNC: 0.3 UG/ML FEU (ref 0–0.5)
DIFFERENTIAL METHOD BLD: ABNORMAL
EOSINOPHIL # BLD AUTO: 0 10E9/L (ref 0–0.7)
EOSINOPHIL NFR BLD AUTO: 0 %
ERYTHROCYTE [DISTWIDTH] IN BLOOD BY AUTOMATED COUNT: 13.5 % (ref 10–15)
FIBRINOGEN PPP-MCNC: 269 MG/DL (ref 200–420)
GFR SERPL CREATININE-BSD FRML MDRD: 80 ML/MIN/{1.73_M2}
GLUCOSE BLDC GLUCOMTR-MCNC: 107 MG/DL (ref 70–99)
GLUCOSE BLDC GLUCOMTR-MCNC: 211 MG/DL (ref 70–99)
GLUCOSE BLDC GLUCOMTR-MCNC: 72 MG/DL (ref 70–99)
GLUCOSE BLDC GLUCOMTR-MCNC: 77 MG/DL (ref 70–99)
GLUCOSE BLDC GLUCOMTR-MCNC: 85 MG/DL (ref 70–99)
GLUCOSE BLDC GLUCOMTR-MCNC: 94 MG/DL (ref 70–99)
GLUCOSE BLDC GLUCOMTR-MCNC: 95 MG/DL (ref 70–99)
GLUCOSE SERPL-MCNC: 99 MG/DL (ref 70–99)
HCT VFR BLD AUTO: 28 % (ref 35–47)
HGB BLD-MCNC: 9.5 G/DL (ref 11.7–15.7)
HGB BLD-MCNC: 9.7 G/DL (ref 11.7–15.7)
IMM GRANULOCYTES # BLD: 0.1 10E9/L (ref 0–0.4)
IMM GRANULOCYTES NFR BLD: 0.6 %
INR PPP: 1.08 (ref 0.86–1.14)
INTERPRETATION ECG - MUSE: NORMAL
LDH SERPL L TO P-CCNC: 182 U/L (ref 81–234)
LYMPHOCYTES # BLD AUTO: 1.7 10E9/L (ref 0.8–5.3)
LYMPHOCYTES NFR BLD AUTO: 19.6 %
MCH RBC QN AUTO: 30.8 PG (ref 26.5–33)
MCHC RBC AUTO-ENTMCNC: 33.9 G/DL (ref 31.5–36.5)
MCV RBC AUTO: 91 FL (ref 78–100)
MONOCYTES # BLD AUTO: 0.3 10E9/L (ref 0–1.3)
MONOCYTES NFR BLD AUTO: 3.7 %
NEUTROPHILS # BLD AUTO: 6.5 10E9/L (ref 1.6–8.3)
NEUTROPHILS NFR BLD AUTO: 75.8 %
NRBC # BLD AUTO: 0 10*3/UL
NRBC BLD AUTO-RTO: 0 /100
PLATELET # BLD AUTO: 288 10E9/L (ref 150–450)
POTASSIUM SERPL-SCNC: 3.4 MMOL/L (ref 3.4–5.3)
RBC # BLD AUTO: 3.08 10E12/L (ref 3.8–5.2)
RETICS # AUTO: 98.6 10E9/L (ref 25–95)
RETICS/RBC NFR AUTO: 3.2 % (ref 0.5–2)
SODIUM SERPL-SCNC: 138 MMOL/L (ref 133–144)
WBC # BLD AUTO: 8.6 10E9/L (ref 4–11)

## 2020-11-11 PROCEDURE — 36415 COLL VENOUS BLD VENIPUNCTURE: CPT | Performed by: STUDENT IN AN ORGANIZED HEALTH CARE EDUCATION/TRAINING PROGRAM

## 2020-11-11 PROCEDURE — 250N000013 HC RX MED GY IP 250 OP 250 PS 637: Performed by: STUDENT IN AN ORGANIZED HEALTH CARE EDUCATION/TRAINING PROGRAM

## 2020-11-11 PROCEDURE — 85018 HEMOGLOBIN: CPT | Performed by: STUDENT IN AN ORGANIZED HEALTH CARE EDUCATION/TRAINING PROGRAM

## 2020-11-11 PROCEDURE — 99233 SBSQ HOSP IP/OBS HIGH 50: CPT | Mod: GC | Performed by: INTERNAL MEDICINE

## 2020-11-11 PROCEDURE — 85025 COMPLETE CBC W/AUTO DIFF WBC: CPT | Performed by: STUDENT IN AN ORGANIZED HEALTH CARE EDUCATION/TRAINING PROGRAM

## 2020-11-11 PROCEDURE — 85045 AUTOMATED RETICULOCYTE COUNT: CPT | Performed by: STUDENT IN AN ORGANIZED HEALTH CARE EDUCATION/TRAINING PROGRAM

## 2020-11-11 PROCEDURE — 99232 SBSQ HOSP IP/OBS MODERATE 35: CPT | Performed by: CLINICAL NURSE SPECIALIST

## 2020-11-11 PROCEDURE — 85610 PROTHROMBIN TIME: CPT | Performed by: STUDENT IN AN ORGANIZED HEALTH CARE EDUCATION/TRAINING PROGRAM

## 2020-11-11 PROCEDURE — 999N001017 HC STATISTIC GLUCOSE BY METER IP

## 2020-11-11 PROCEDURE — 85379 FIBRIN DEGRADATION QUANT: CPT | Performed by: STUDENT IN AN ORGANIZED HEALTH CARE EDUCATION/TRAINING PROGRAM

## 2020-11-11 PROCEDURE — 83615 LACTATE (LD) (LDH) ENZYME: CPT | Performed by: STUDENT IN AN ORGANIZED HEALTH CARE EDUCATION/TRAINING PROGRAM

## 2020-11-11 PROCEDURE — 86140 C-REACTIVE PROTEIN: CPT | Performed by: STUDENT IN AN ORGANIZED HEALTH CARE EDUCATION/TRAINING PROGRAM

## 2020-11-11 PROCEDURE — 99232 SBSQ HOSP IP/OBS MODERATE 35: CPT | Performed by: NURSE PRACTITIONER

## 2020-11-11 PROCEDURE — 85384 FIBRINOGEN ACTIVITY: CPT | Performed by: STUDENT IN AN ORGANIZED HEALTH CARE EDUCATION/TRAINING PROGRAM

## 2020-11-11 PROCEDURE — 250N000011 HC RX IP 250 OP 636: Performed by: STUDENT IN AN ORGANIZED HEALTH CARE EDUCATION/TRAINING PROGRAM

## 2020-11-11 PROCEDURE — 258N000003 HC RX IP 258 OP 636: Performed by: STUDENT IN AN ORGANIZED HEALTH CARE EDUCATION/TRAINING PROGRAM

## 2020-11-11 PROCEDURE — 80048 BASIC METABOLIC PNL TOTAL CA: CPT | Performed by: STUDENT IN AN ORGANIZED HEALTH CARE EDUCATION/TRAINING PROGRAM

## 2020-11-11 PROCEDURE — 999N000128 HC STATISTIC PERIPHERAL IV START W/O US GUIDANCE

## 2020-11-11 PROCEDURE — 120N000002 HC R&B MED SURG/OB UMMC

## 2020-11-11 RX ORDER — HYDROMORPHONE HCL IN WATER/PF 6 MG/30 ML
.2-.4 PATIENT CONTROLLED ANALGESIA SYRINGE INTRAVENOUS
Status: COMPLETED | OUTPATIENT
Start: 2020-11-11 | End: 2020-11-11

## 2020-11-11 RX ORDER — HYDROCORTISONE 10 MG/1
10 TABLET ORAL DAILY
Status: DISCONTINUED | OUTPATIENT
Start: 2020-11-12 | End: 2020-11-12 | Stop reason: HOSPADM

## 2020-11-11 RX ORDER — METHOCARBAMOL 500 MG/1
500 TABLET, FILM COATED ORAL 4 TIMES DAILY PRN
Status: DISCONTINUED | OUTPATIENT
Start: 2020-11-11 | End: 2020-11-12 | Stop reason: HOSPADM

## 2020-11-11 RX ADMIN — LEVOTHYROXINE SODIUM 112 MCG: 0.11 TABLET ORAL at 09:13

## 2020-11-11 RX ADMIN — METOCLOPRAMIDE 10 MG: 10 TABLET ORAL at 09:13

## 2020-11-11 RX ADMIN — SODIUM CHLORIDE, POTASSIUM CHLORIDE, SODIUM LACTATE AND CALCIUM CHLORIDE: 600; 310; 30; 20 INJECTION, SOLUTION INTRAVENOUS at 01:26

## 2020-11-11 RX ADMIN — HYDROMORPHONE HYDROCHLORIDE 0.4 MG: 0.2 INJECTION, SOLUTION INTRAMUSCULAR; INTRAVENOUS; SUBCUTANEOUS at 06:32

## 2020-11-11 RX ADMIN — ACETAMINOPHEN 975 MG: 325 TABLET, FILM COATED ORAL at 04:18

## 2020-11-11 RX ADMIN — METOCLOPRAMIDE 10 MG: 10 TABLET ORAL at 19:43

## 2020-11-11 RX ADMIN — DULOXETINE HYDROCHLORIDE 90 MG: 30 CAPSULE, DELAYED RELEASE ORAL at 09:13

## 2020-11-11 RX ADMIN — METOCLOPRAMIDE 10 MG: 10 TABLET ORAL at 13:11

## 2020-11-11 RX ADMIN — TOPIRAMATE 100 MG: 50 TABLET ORAL at 09:13

## 2020-11-11 RX ADMIN — HYDROMORPHONE HYDROCHLORIDE 0.2 MG: 0.2 INJECTION, SOLUTION INTRAMUSCULAR; INTRAVENOUS; SUBCUTANEOUS at 22:43

## 2020-11-11 RX ADMIN — ACETAMINOPHEN 975 MG: 325 TABLET, FILM COATED ORAL at 13:11

## 2020-11-11 RX ADMIN — Medication 50 MG: at 13:11

## 2020-11-11 RX ADMIN — ACETAMINOPHEN 975 MG: 325 TABLET, FILM COATED ORAL at 19:43

## 2020-11-11 RX ADMIN — MENTHOL 1 PATCH: 205.5 PATCH TOPICAL at 17:07

## 2020-11-11 RX ADMIN — HYDROCORTISONE SODIUM SUCCINATE 25 MG: 100 INJECTION, POWDER, FOR SOLUTION INTRAMUSCULAR; INTRAVENOUS at 01:56

## 2020-11-11 RX ADMIN — HYDROMORPHONE HYDROCHLORIDE 4 MG: 2 TABLET ORAL at 09:13

## 2020-11-11 RX ADMIN — HYDROMORPHONE HYDROCHLORIDE 0.4 MG: 0.2 INJECTION, SOLUTION INTRAMUSCULAR; INTRAVENOUS; SUBCUTANEOUS at 02:03

## 2020-11-11 RX ADMIN — Medication 50 MG: at 19:43

## 2020-11-11 RX ADMIN — PANCRELIPASE 72000 UNITS: 60000; 12000; 38000 CAPSULE, DELAYED RELEASE PELLETS ORAL at 18:43

## 2020-11-11 RX ADMIN — HYDROCORTISONE 15 MG: 10 TABLET ORAL at 19:46

## 2020-11-11 RX ADMIN — METHOCARBAMOL 500 MG: 500 TABLET, FILM COATED ORAL at 18:41

## 2020-11-11 RX ADMIN — ONDANSETRON HYDROCHLORIDE 4 MG: 4 TABLET, FILM COATED ORAL at 10:45

## 2020-11-11 RX ADMIN — TOPIRAMATE 100 MG: 50 TABLET ORAL at 19:43

## 2020-11-11 ASSESSMENT — ACTIVITIES OF DAILY LIVING (ADL)
ADLS_ACUITY_SCORE: 13

## 2020-11-11 NOTE — PROGRESS NOTES
3790-2579: Pt AOX4, VSS on RA. C/o abd pain, has PO dilaudid avilable q3hr for relief. Ambulatory pump in place, will be suspended tonight at MN to 0800 per endo rec's. IV infusing LR 100mL/hr and continuous protonix gtt (compatable). NPO for EGD procedure tmrw 1830. Calls appropriately, makes needs known. Will continue to monitor and follow POC.

## 2020-11-11 NOTE — PLAN OF CARE
Alert and oriented x 4. Up in room with standby assist . Complained of headache with partial relief from Tylenol and prn IV Dilaudid. Maintained on NPO except meds. Ambulatory Insulin pump turned off at HS.  BG monitored every 4 hours, results between 84 to 95. No bloody stool this shift. IV protonix drip discontinued and switched to po. Vital signs stable on room air. For EGD today. Will continue to monitor and follow plan of care

## 2020-11-11 NOTE — TELEPHONE ENCOUNTER
1st schedule attempt - EGD @ [address] with CS    Procedure: Upper Endoscopy    Upper Endoscopy Type: EGD    Upper Endoscopy Sedation: Conscious/Moderate    Upper Endoscopy Reason for Procedure: Acute on chronic anemia, melena on 11/8/2020 admission    Preferred Location: [facility] [place]    Scheduling Instructions: If you have not heard from the scheduling office within 2 business days, please call [phone].           Associated Diagnoses    Gastrointestinal hemorrhage, unspecified gastrointestinal hemorrhage type [K92.2]

## 2020-11-11 NOTE — PROGRESS NOTES
GASTROENTEROLOGY Progress Note      Date of Admission:  11/8/2020          ASSESSMENT AND RECOMMENDATIONS:   Assessment:  54 year old female with a history of chronic pancreatitis s/p total pancreatectomy and islet autotransplant (01/06/2012), DM I, GERD, impaired gastric emptying, and chronic abdominal pain who presents for evaluation due to nausea, vomiting and melena.    #Acuteonchronicanemia  #melena  #covid19  #chronic abdominal pain s/p total pancreatectomy  Given clinical stability (hgb stable, absence of melena yesterday with maroon stool on rectal exam, vss) it is likely that bleeding has stopped or significantly slowed and likelihood of identifying active bleeding is less likely. Also, given concurrent Covid 19 infection decision to forgo inpatient EGD at this time with a plan for outpatient EGD reinforced. Pt is amenable to this plan. Reiterated the need to monitor stools for continual improvement in blood amount and character (no new black liquid stool/melena) as well as closely monitoring for dizziness, lightheadedness, presyncope, and increased dyspnea/fatigue with need to followup with PCP or seek emergent care with abrupt or severe symptoms. Patient verbalized understanding of the plan  -Pantoprazole 40 mg BID 8 weeks  -Outpatient EGD in 4-6 weeks after symptoms have dissipated, will need repeat negative covid test prior to procedure (EGD order placed)  -Continue to avoid NSAIDs  -Contact GI with significant change to hemodynamic stability in setting of significant overt GI bleeding prior to discharge    GI will signoff. Thank you for involving us in this patient's care. Please do not hesitate to contact the GI service with any questions or concerns.     Pt care plan discussed with Dr. Michelle, GI staff physician.    Jasvir Caldera, APRN CNP  [phone]  -------------------------------------------------------------------------------------------------------------------          Chief Complaint:   We  were asked by Dr. Quinones of medicine to evaluate this patient with rectal bleeding and anemia    History is obtained from the patient and the medical record.          Interval History:   Chantell Kidd is a 54 year old female with a history of chronic pancreatitis s/p total pancreatectomy and islet autotransplant (01/06/2012), DM I, GERD, impaired gastric emptying, and chronic abdominal pain who presents for evaluation due to nausea, vomiting and melena.    Update:  Reports no dizziness, lightheadedness, or symptoms with standing. Tolerating full liquids yesterday pm. Continues to note maroon colored stools without report of black liquid stools or BRBPR. Denies vomiting or increase in abdominal pain.          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    TPIAT     Spasm of sphincter of Oddi             Past Surgical History:   Reviewed and edited as appropriate   Past Surgical History:   Procedure Laterality Date     ARTHROPLASTY CARPOMETACARPAL (THUMB JOINT)  5/2/2014    Procedure: ARTHROPLASTY CARPOMETACARPAL (THUMB JOINT);  Surgeon: Carina Panda MD;  Location: [facility]     CHOLECYSTECTOMY  2004     COLONOSCOPY  7/18/2014    Procedure: COLONOSCOPY;  Surgeon: Aurora Sahu MD;  Location:  GI     COLONOSCOPY N/A 8/1/2017    Procedure: COLONOSCOPY;  Colonoscopy and upper endoscopy;  Surgeon: Deirdre Harris MD;  Location:  GI     ENDOSCOPIC RETROGRADE CHOLANGIOPANCREATOGRAM       ENDOSCOPIC RETROGRADE CHOLANGIOPANCREATOGRAM  4/19/2011     Procedure:ENDOSCOPIC RETROGRADE CHOLANGIOPANCREATOGRAM; Pancreatic Stent Placement       ENDOSCOPIC RETROGRADE CHOLANGIOPANCREATOGRAM  5/26/2011    Procedure:ENDOSCOPIC RETROGRADE CHOLANGIOPANCREATOGRAM; with Pancreatic Stent Removal; Surgeon:DALE MIMS; Location:[facility]     ENDOSCOPY UPPER, COLONOSCOPY, COMBINED  4/25/2012    Procedure:COMBINED ENDOSCOPY UPPER, COLONOSCOPY; Enteroscopy with Bile Duct Stent Removal, Colonoscopy  *[facility] Safe Room*; Surgeon:GRACY GODWINIQ; Location:[facility]     ESOPHAGOSCOPY, GASTROSCOPY, DUODENOSCOPY (EGD), COMBINED  5/26/2011    Procedure:COMBINED ESOPHAGOSCOPY, GASTROSCOPY, DUODENOSCOPY (EGD); Surgeon:DALE MIMS; [facility]     ESOPHAGOSCOPY, GASTROSCOPY, DUODENOSCOPY (EGD), COMBINED N/A 10/30/2014    Procedure: COMBINED ESOPHAGOSCOPY, GASTROSCOPY, DUODENOSCOPY (EGD), BIOPSY SINGLE OR MULTIPLE;  Surgeon: Sarai Moon MD;  Location: [facility]     ESOPHAGOSCOPY, GASTROSCOPY, DUODENOSCOPY (EGD), COMBINED Left 7/6/2015    Procedure: COMBINED ESOPHAGOSCOPY, GASTROSCOPY, DUODENOSCOPY (EGD), BIOPSY SINGLE OR MULTIPLE;  Surgeon: Thomas Estrada MD;  Location: [facility]     ESOPHAGOSCOPY, GASTROSCOPY, DUODENOSCOPY (EGD), COMBINED N/A 7/8/2016    Procedure: COMBINED ESOPHAGOSCOPY, GASTROSCOPY, DUODENOSCOPY (EGD), BIOPSY SINGLE OR MULTIPLE;  Surgeon: Eloy Klein MD;  [facility]: [facility]     ESOPHAGOSCOPY, GASTROSCOPY, DUODENOSCOPY (EGD), COMBINED N/A 8/4/2016    Procedure: COMBINED ESOPHAGOSCOPY, GASTROSCOPY, DUODENOSCOPY (EGD), BIOPSY SINGLE OR MULTIPLE;  Surgeon: Jason Brown MD;  Location: [facility]     ESOPHAGOSCOPY, GASTROSCOPY, DUODENOSCOPY (EGD), COMBINED N/A 8/1/2017    Procedure: COMBINED ESOPHAGOSCOPY, GASTROSCOPY, DUODENOSCOPY (EGD);;  Surgeon: Deirdre Harris MD;  [facility]: [facility]     ESOPHAGOSCOPY, GASTROSCOPY, DUODENOSCOPY (EGD), COMBINED N/A 6/12/2019    Procedure: ESOPHAGOGASTRODUODENOSCOPY (EGD);  Surgeon:  Jose Francisco Perdomo MD;  Location: [facility]     GYN SURGERY      Hysterectomy and USO     [facility] ENDOSCOPY W EUS  7/20/2011    Procedure:COMBINED ENDOSCOPIC ULTRASOUND, ESOPHAGOSCOPY, GASTROSCOPY, DUODENOSCOPY (EGD); Surgeon:DARVIN DONOHUE; Location[facility]     HERNIORRHAPHY VENTRAL N/A 9/15/2016    Procedure: HERNIORRHAPHY VENTRAL;  Surgeon: Juanita Bernabe MD;  [facility]     HYSTERECTOMY  1997 or 1998    USO     INCISION AND DRAINAGE ABDOMEN WASHOUT, COMBINED  8/16/2012    Procedure: COMBINED INCISION AND DRAINAGE ABDOMEN WASHOUT;  ,debridement and Drainage Post Appendectomy;  Surgeon: Ron Austin MD;  [facility]     INJECT TRANSVERSUS ABDOMINIS PLANE (TAP) BLOCK BILATERAL Bilateral 5/26/2016    Procedure: INJECT TRANSVERSUS ABDOMINIS PLANE (TAP) BLOCK BILATERAL;  Surgeon: Leonard Mccallum MD;  [facility]     LAPAROSCOPIC APPENDECTOMY  7/30/2012    Procedure: LAPAROSCOPIC APPENDECTOMY;  Open Appendectomy;  Surgeon: Ron Austin MD;  [facility]     PANCREATECTOMY, TRANSPLANT AUTO ISLET CELL, COMBINED  1/6/2012    Procedure:COMBINED PANCREATECTOMY, TRANSPLANT AUTO ISLET CELL; Total  Pancreatectomy, Auto Islet Transplant, splenectomy, 18fr. transgastric-jejunal feeding tube placement, liver biopsy; Surgeon:PALAK LEE; [facility]     REPLACE GASTROSTOMY TUBE, PERCUTANEOUS N/A 8/30/2017    Procedure: REPLACE GASTROSTOMY TUBE, PERCUTANEOUS;  GJ Tube Change;  Surgeon: Jose Nath PA-C;  [facility]     SPLENECTOMY              Previous Endoscopy:     EGD 8/1/2017    Upper GI Endoscopy 06/12/2019  9:56 AM [address] [place] [address] [phone]     Endoscopy Department   _______________________________________________________________________________   Patient Name: Chantell Kidd               Procedure Date: 6/12/2019 9:56 AM   MRN: [id number]                       Account Number: [id number]    YOB: 1965             Admit Type: Inpatient   Age: 53                               Room: Rm #3   Gender: Female                        Note Status: Finalized   Attending MD: Jose Francisco Perdomo MD   Total Sedation Time:   _______________________________________________________________________________       Procedure:           Upper GI endoscopy   Indications:         Abdominal pain, Iron deficiency anemia, Dysphagia   Providers:           Jose Francisco Perdomo MD, Ruth Cobb RN   Referring MD:           Medicines:           Fentanyl 50 micrograms IV, Midazolam 1 mg IV, Lidocaine                        spray   Complications:       No immediate complications.   _______________________________________________________________________________   Procedure:           Pre-Anesthesia Assessment:                        - Prior to the procedure, a History and Physical was                        performed, and patient medications and allergies were                        reviewed. The patient is competent. The risks and                        benefits of the procedure and the sedation options and                        risks were discussed with the patient. All questions                        were answered and informed consent was obtained. Patient                        identification and proposed procedure were verified by                        the physician and the nurse in the procedure room.                        Mental Status Examination: alert and oriented. Airway                        Examination: normal oropharyngeal airway and neck                        mobility. Respiratory Examination: clear to                        auscultation. CV Examination: normal. Prophylactic                        Antibiotics: The patient does not require prophylactic                        antibiotics. Prior Anticoagulants: The patient has taken                        no previous anticoagulant or antiplatelet  agents. ASA                        Grade Assessment: III - A patient with severe systemic                        disease. After reviewing the risks and benefits, the                        patient was deemed in satisfactory condition to undergo                        the procedure. The anesthesia plan was to use moderate                        sedation / analgesia (conscious sedation). Immediately                        prior to administration of medications, the patient was                        re-assessed for adequacy to receive sedatives. The heart                        rate, respiratory rate, oxygen saturations, blood                        pressure, adequacy of pulmonary ventilation, and                        response to care were monitored throughout the                        procedure. The physical status of the patient was                        re-assessed after the procedure.                        After obtaining informed consent, the endoscope was                        passed under direct vision. Throughout the procedure,                        the patient's blood pressure, pulse, and oxygen                        saturations were monitored continuously. The Endoscope                        was introduced through the mouth, and advanced to the                        pylorus. The upper GI endoscopy was accomplished without                        difficulty. The patient tolerated the procedure well.                                                                                     Findings:        The examined esophagus was normal.        The Z-line was irregular and was found 39 cm from the incisors.        Patchy mildly erythematous mucosa without bleeding was found at the        gastroesophageal junction.        A severe stenosis was found at the pylorus. This was non-traversed.        The exam was otherwise without abnormality.                                                                                      Moderate Sedation:        Moderate (conscious) sedation was administered by the endoscopy nurse        and supervised by the endoscopist. The following parameters were        monitored: oxygen saturation, heart rate, blood pressure, and response        to care. Total physician intraservice time was 10 minutes.   Impression:          53F presenting with central abdominal pain with a PMH of                        TPIAT (1/2012 w/ Dr. Alves) who was admitted with                        reports of dysphagia, abdominal pain, reported melena                        and coffee ground emesis with 2gram drop in hemoglobin                        concerning for possible GI bleeding. Upper endoscopy                        notable for severe pyloric stenosis with 2mm os, unable                        to traverse. Otherwise notable for mild erythema at the                        GE junction and irregular z-line at 39cm fro the                        incisors. Otherwise normal exam. Solid food dysphagia                        related to evere pyloric stenosis, no clear etiology of                        reported melena identified on exam, suspect possible                        PUD, however unable to examine duodenum.   Recommendation:      - Return patient to [facility] for ongoing care.                        - Full liquid diet today.                        - Continue present medications.                        - Use a proton pump inhibitor PO BID for 8 weeks then                        once daily thereafter                        - Consider UGI series with SBFT                        - Likely repeat EGD with possible dialtion and/or stent                        pending UGI series                        - Additional recommendations per primary GI service            Results for orders placed or performed during the hospital encounter of 08/01/17   COLONOSCOPY   Result Value Ref Range    COLONOSCOPY        [address] [place] [address] [phone]     Endoscopy Department  _______________________________________________________________________________  Patient Name: Chantell Kidd               Procedure Date: 8/1/2017 7:56 AM  MRN: [id number]                       Account Number: [id number]  YOB: 1965             Admit Type: Outpatient  Age: 51                               Room: Spec. Proc.  Gender: Female                        Note Status: Finalized  Attending MD: Deirdre Harris MD  Total Sedation Time:   _______________________________________________________________________________     Procedure:           Colonoscopy  Indications:         Rectal bleeding  Providers:           Deirdre Harris MD, Yuliet Marie RN  Patient Profile:     Ms. Kidd is a 49 year old female with prior idiopathic                        chronic pancreatitis s/p EUS and multiple ERCPs with                        eventual total pancreatectomy with a uto-islet cell                        transplant in 1/2012, with ongoing chronic abdominal                        pain (with a negative evaluation including prior CTs,                        MRCPs, US abd, EGDs and colonoscopies), nausea/vomiting                        (now improved), and iron deficiency anemia. She is                        referred for colonoscopy for BRBPR.  Referring MD:        Thomas Estrada MD  Medicines:           Monitored Anesthesia Care  Complications:       No immediate complications.  _______________________________________________________________________________  Procedure:           Pre-Anesthesia Assessment:                       - See the other procedure note for documentation of the                        pre-procedure assessment.                       - See separate Anesthesia note for pre-anesthesia                        assessment.                       After obtaining informed consent, the  colonoscope was                        passed under dir ect vision. Throughout the procedure,                        the patient's blood pressure, pulse, and oxygen                        saturations were monitored continuously. The Colonoscope                        was introduced through the anus and advanced to the                        terminal ileum. The colonoscopy was performed without                        difficulty. The patient tolerated the procedure well.                        The quality of the bowel preparation was evaluated using                        the BBPS (Austin Bowel Preparation Scale) with scores                        of: Right Colon = 1 (portion of mucosa seen, but other                        areas not well seen due to staining, residual stool                        and/or opaque liquid), Transverse Colon = 2 (minor                        amount of residual staining, small fragments of stool                        and/or opaque liquid, but mucosa seen well) and Left                        Colon = 2 (minor amoun t of residual staining, small                        fragments of stool and/or opaque liquid, but mucosa seen                        well). The total BBPS score equals 5. The quality of the                        bowel preparation was fair to good, but with extensive                        lavage and cleaning the prep was felt to be good.                                                                                   Findings:       The perianal exam findings include skin tags.       The digital rectal exam was normal.       The terminal ileum appeared normal.       Semi-liquid stool was seen in the right and mid-colon. Some solid        stool/vegetable material was found in the transverse colon, making        visualization difficult. Extensive lavage of the entire colon was        performed (>1500 mL) resulting in clearance with adequate visualization.        The solid pieces  of stool had to be moved with the scope to visualize        underneath.       The exam was otherwise without  abnormality on direct and retroflexion        views.                                                                                   Impression:          No source of bleeding found on exam and no signs of                        active or recent bleeding. Noted skin tags which may                        suggest prior hemorrhoids and irregular movements which                        can predispose to fissure.                       - Preparation of the colon was fair, but after lavage                        was felt to be good.                       - Perianal skin tags found on perianal exam.                       - The examined portion of the ileum was normal.                       - Some small solid stool in the transverse colon which                        was moved to facilitate visualization.                       - The examination was otherwise normal on direct and                        retroflexion views.                       - No specimens collected.  Recommendation:      -  Discharge patient to home.                       - Resume previous diet.                       - Return to referring physician.                       - Repeat colonoscopy for screening purposes in 10 years                        (unless there is new information regarding family                        history which would prompt earlier exam).                       - Future colonoscopies should be done with a 1.5 - 2 day                        prep.                                                                                     Deirdre Harris MD  _______________________  Deirdre Harris MD  8/1/2017 12:04:46 PM  I was physically present for the entire viewing portion of the exam.  __________________________  Signature of teaching physician  Ernestina/Tyler Harris MD  Number of Addenda: 0    Note Initiated On:  8/1/2017 7:56 AM  Scope In:  Scope Out:              Social History:   Reviewed and edited as appropriate  Social History     Socioeconomic History     Marital status:      Spouse name: Not on file     Number of children: Not on file     Years of education: Not on file     Highest education level: Not on file   Occupational History     Not on file   Social Needs     Financial resource strain: Not on file     Food insecurity     Worry: Not on file     Inability: Not on file     Transportation needs     Medical: Not on file     Non-medical: Not on file   Tobacco Use     Smoking status: Never Smoker     Smokeless tobacco: Never Used   Substance and Sexual Activity     Alcohol use: No     Alcohol/week: 0.0 standard drinks     Drug use: No     Sexual activity: Yes   Lifestyle     Physical activity     Days per week: Not on file     Minutes per session: Not on file     Stress: Not on file   Relationships     Social connections     Talks on phone: Not on file     Gets together: Not on file     Attends Buddhist service: Not on file     Active member of club or organization: Not on file     Attends meetings of clubs or organizations: Not on file     Relationship status: Not on file     Intimate partner violence     Fear of current or ex partner: Not on file     Emotionally abused: Not on file     Physically abused: Not on file     Forced sexual activity: Not on file   Other Topics Concern     Parent/sibling w/ CABG, MI or angioplasty before 65F 55M? Not Asked   Social History Narrative     Not on file            Family History:   Reviewed and edited as appropriate  Family History   Problem Relation Age of Onset     Hypertension Mother      Diabetes Mother      Osteoporosis Mother      Cancer Father         pancreatic cancer     Diabetes Maternal Grandmother      Cardiovascular Maternal Grandmother      Cancer Maternal Grandfather         lung cancer     Cancer Sister         brain     Cancer Sister         liver  cancer            Allergies:   Reviewed and edited as appropriate     Allergies   Allergen Reactions     Corticosteroids Other (See Comments)     All oral, IV and injectable steroids are contraindicated (unless in life threatening situations) in Islet Auto transplant recipients. They can cause irreversible loss of islet cell function. Please contact patient's transplant care coordinator YURI Cortez RN at [phone]/pager [phone] and/or endocrinologist prior to administration.       Chocolate Flavor Rash     Breaks out when eats chocolate            Medications:     Current Facility-Administered Medications   Medication     acetaminophen (TYLENOL) tablet 975 mg     glucose gel 15-30 g    Or     dextrose 50 % injection 25-50 mL    Or     glucagon injection 1 mg     DULoxetine (CYMBALTA) DR capsule 90 mg     hydrocortisone sodium succinate PF (solu-CORTEF) injection 25 mg     HYDROmorphone (DILAUDID) injection 0.2-0.4 mg     HYDROmorphone (DILAUDID) tablet 2-4 mg     insulin 1 unit/1mL in saline (NovoLIN-Regular) infusion- SOT TPIAT algorithm     insulin aspart (NovoLOG/FIASP) 100 UNIT/ML VIAL FOR FILLING PUMP RESERVOIR     insulin aspart (NovoLOG/FIASP) 100 UNIT/ML VIAL FOR FILLING PUMP RESERVOIR     insulin basal rate from AMBULATORY PUMP     insulin bolus from AMBULATORY PUMP     insulin bolus from AMBULATORY PUMP     insulin bolus from AMBULATORY PUMP     lactated ringers infusion     levothyroxine (SYNTHROID/LEVOTHROID) tablet 112 mcg     Lidocaine (LIDOCARE) 4 % Patch 1 patch    And     lidocaine patch in PLACE     lidocaine (LMX4) cream     lidocaine 1 % 0.1-1 mL     Medication instructions: Do NOT use nebulized medications     melatonin tablet 1 mg     menthol (ICY HOT) 5 % patch 1 patch    And     menthol (ICY HOT) Patch in Place     metoclopramide (REGLAN) tablet 10 mg     naloxone (NARCAN) injection 0.1-0.4 mg     ondansetron (ZOFRAN) tablet 4-8 mg     pantoprazole (PROTONIX) 2 mg/mL suspension 40  "mg     polyethylene glycol (MIRALAX) Packet 17 g     senna-docusate (SENOKOT-S/PERICOLACE) 8.6-50 MG per tablet 1 tablet    Or     senna-docusate (SENOKOT-S/PERICOLACE) 8.6-50 MG per tablet 2 tablet     sodium chloride (PF) 0.9% PF flush 3 mL     sodium chloride (PF) 0.9% PF flush 3 mL     topiramate (TOPAMAX) tablet 100 mg     traZODone (DESYREL) tablet 100-200 mg             Review of Systems:     A complete review of systems was performed and is negative except as noted in the HPI           Physical Exam:   /68 (BP Location: Right arm)   Pulse 77   Temp 97.6  F (36.4  C) (Oral)   Resp 16   Ht 1.575 m (5' 2\")   Wt 67.5 kg (148 lb 13 oz)   SpO2 97%   BMI 27.22 kg/m    Wt:   Wt Readings from Last 2 Encounters:   11/08/20 67.5 kg (148 lb 13 oz)   11/21/19 67.2 kg (148 lb 3.2 oz)      Constitutional: cooperative, pleasant, not dyspneic/diaphoretic, no acute distress  Eyes: Sclera anicteric/injected  Ears/nose/mouth/throat: Mucus membranes moist, hearing intact  Neck: supple without obvious mass  CV: No edema  Respiratory: Unlabored breathing on RA  Abd: Nondistended  Skin: warm, perfused, no jaundice  Neuro: AAO x 3  Psych: Normal affect  MSK: No gross deformities         Data:   Labs and imaging below were independently reviewed and interpreted    BMP  Recent Labs   Lab 11/11/20  0717 11/10/20  0608 11/09/20  0741 11/09/20  0646    139 141 141   POTASSIUM 3.4 3.5 3.6 3.5   CHLORIDE 107 108 110* 109   ELIZA 8.5 8.6 8.9 8.9   CO2 23 24 26 25   BUN 9 6* 5* 5*   CR 0.82 0.78 0.76 0.75   GLC 99 145* 100* 128*     CBC  Recent Labs   Lab 11/11/20  0717 11/10/20  0608 11/10/20  0608 11/09/20  0646 11/09/20  0646 11/08/20  1634 11/08/20  1634   WBC 8.6  --  8.2  --  10.0  --  7.6   RBC 3.08*  --  3.18*  --  2.96*  --  2.88*   HGB 9.5*   < > 9.8*   < > 9.1*   < > 8.7*   HCT 28.0*  --  28.6*  --  27.3*  --  26.3*   MCV 91  --  90  --  92  --  91   MCH 30.8  --  30.8  --  30.7  --  30.2   MCHC 33.9  --  34.3  -- "  33.3  --  33.1   RDW 13.5  --  13.3  --  13.7  --  13.4     --  299  --  276  --  277    < > = values in this interval not displayed.     INR  Recent Labs   Lab 11/11/20  0717 11/10/20  0608 11/09/20  0741 11/08/20  1309   INR 1.08 1.04 1.05 0.89     LFTs  Recent Labs   Lab 11/09/20  0741 11/09/20  0646 11/08/20  1309   ALKPHOS 88 84 101   AST 34 31 58*   ALT 50 47 66*   BILITOTAL 0.4 0.3 0.2   PROTTOTAL 6.3* 6.1* 7.1   ALBUMIN 3.2* 3.0* 3.7      PANCNo lab results found in last 7 days.           Examination:  XR UPPER GI W SMALL BOWEL FOLLOW THROUGH 6/13/2019 11:20  AM      Comparison: EGD 6/12/2019, CT abdomen and pelvis 6/10/2019, upper GI  11/17/2016     History: Pt w/ recent EGD (for melena) showing pyloric stenosis.     Fluoroscopy time: 1.5 minutes.     Technique: Double contrast modified upper GI.     Findings:  A double contrast modified upper GI was performed. Prompt  contrast passage from the esophagus into the stomach. No esophageal  filling defects. The rugal pattern is within normal limits. The  visualized proximal small bowel is within normal limits.                                                                       Impression: In this patient with a pylorus sparing  pancreaticoduodenectomy with duodedenal-duodenal anastomosis, no  definite stricture identified.     I have personally reviewed the examination and initial interpretation  and I agree with the findings.    Imaging:    CT AP 11/8/2020    IMPRESSION:   1. Increased colonic wall thickening in the rectum, sigmoid and  descending colon, suggestive of colitis although, since the coon is  decompressed, this could be artifactual.  2. Stable extensive postsurgical changes in the abdomen. Stable  pneumobilia.     I have personally reviewed the examination and initial interpretation  and I agree with the findings.     BERONICA ARGUETA MD

## 2020-11-11 NOTE — PLAN OF CARE
Pt. A/Ox4, VSS on RA. Abd, back. Headache. And epigastric/chest pain managed with PRN PO dilaudid x1, and then tramadol x1. Pt also on scheduled tylenol. Pain changed minimally per pt.  Pt up independently in room. PIV infusing LR @ 100cc/hr. Pt now on oral protonix. IV and oral dilaudid discontinued, tramadol added for pain instead.  Per GI, pt will have outpatient EGD instead as pt is hemodynamically stable. Plan for possible discharge to home tomorrow.

## 2020-11-11 NOTE — PROGRESS NOTES
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         Glucose consistently under 100 in setting of low or no calorie intake for several days.    - Medtronic 670 g in manual mode, aspart    --suspend basal rate while NPO, when eating and BG >150 x 2, resume at 50%    --bolus using calculator in pump: 1 per 100 mg/dL glucose for BG >140 and carb cover 1 per 40 grams    -BG q4h, or change to qAC, HS 0200 if eating becomes more significant    -CGMS to be resumed promptly after resuming home    -PRN IV insulin available in case of development of persistent hyperglycemia >200     Pt will communicate with Dr. Maher via [employer]/email about BG trends/dosing adjustments after discharge.  Unlikely to need to increase to usual basal until PO increases significantly         Plan discussed with patient, bedside RN, and page to primary team.           Interval History:     The last 24 hours progress and nursing notes reviewed.  Pt was NPO for EGD in OR this evening.  Pump basal suspended overnight without development of low BG, so kept suspended this morning.  Notes from GI now indicate EGD will be delayed.  And RN notes suggests discharge tomorrow.    Recent Labs   Lab 11/11/20  1424 11/11/20  1208 11/11/20  1046 11/11/20  0717 11/11/20  0627 11/11/20  0155 11/10/20  2203 11/10/20  0608 11/10/20  0608 11/09/20  0741 11/09/20  0741 11/09/20  0646 11/08/20  1309 11/08/20  1309   GLC  --   --    "--  99  --   --   --   --  145*  --  100* 128*  --  194*   * 72 77  --  95 85 94   < >  --    < >  --   --    < >  --     < > = values in this interval not displayed.           Nutrition:     Orders Placed This Encounter      Regular Diet Adult            PTA Regimen:   Medtronic 670G, with Guardian sensor- manual mode   Basal- 0.5 unit/hour  (versus 0.675 last April)  Bolus-    I:C ratio 40g,    mg/dL  target             Review of Systems:   See interval hx          Medications:   HC 25 mg q12h  (home cortef 10 mg BID)         Physical Exam:     Gen: Alert, in NAD, resting in bed  HEENT:  hearing intact to conversational volume  Resp: Unlabored, no cough observed  Neuro: oriented x3, communicating clearly  Psych: calm even mood  /73 (BP Location: Right arm)   Pulse 79   Temp 97.1  F (36.2  C) (Oral)   Resp 16   Ht 1.575 m (5' 2\")   Wt 67.5 kg (148 lb 13 oz)   SpO2 97%   BMI 27.22 kg/m               Data:     Lab Results   Component Value Date    A1C 7.3 11/09/2020    A1C 6.7 11/21/2019    A1C 8.2 06/11/2019    A1C Canceled, Test credited 06/10/2019    A1C 9.6 04/18/2019            Recent Labs   Lab Test 11/11/20  0717 11/10/20  0608    139   POTASSIUM 3.4 3.5   CHLORIDE 107 108   CO2 23 24   ANIONGAP 8 6   GLC 99 145*   BUN 9 6*   CR 0.82 0.78   ELIZA 8.5 8.6     CBC RESULTS:   Recent Labs   Lab Test 11/11/20  0717   WBC 8.6   RBC 3.08*   HGB 9.5*   HCT 28.0*   MCV 91   MCH 30.8   MCHC 33.9   RDW 13.5          Margot Vargas APRN [facility] [phone]  Diabetes Management job code [id number]              "

## 2020-11-12 ENCOUNTER — PATIENT OUTREACH (OUTPATIENT)
Dept: CARE COORDINATION | Facility: CLINIC | Age: 55
End: 2020-11-12

## 2020-11-12 VITALS
SYSTOLIC BLOOD PRESSURE: 107 MMHG | RESPIRATION RATE: 16 BRPM | DIASTOLIC BLOOD PRESSURE: 62 MMHG | BODY MASS INDEX: 27.38 KG/M2 | HEART RATE: 72 BPM | HEIGHT: 62 IN | WEIGHT: 148.81 LBS | TEMPERATURE: 98 F | OXYGEN SATURATION: 99 %

## 2020-11-12 PROBLEM — M81.0 AGE-RELATED OSTEOPOROSIS WITHOUT CURRENT PATHOLOGICAL FRACTURE: Status: ACTIVE | Noted: 2020-11-12

## 2020-11-12 LAB
ANION GAP SERPL CALCULATED.3IONS-SCNC: 4 MMOL/L (ref 3–14)
BASOPHILS # BLD AUTO: 0 10E9/L (ref 0–0.2)
BASOPHILS NFR BLD AUTO: 0.4 %
BUN SERPL-MCNC: 8 MG/DL (ref 7–30)
CALCIUM SERPL-MCNC: 8.2 MG/DL (ref 8.5–10.1)
CHLORIDE SERPL-SCNC: 110 MMOL/L (ref 94–109)
CO2 SERPL-SCNC: 27 MMOL/L (ref 20–32)
CREAT SERPL-MCNC: 0.83 MG/DL (ref 0.52–1.04)
CRP SERPL-MCNC: 3.8 MG/L (ref 0–8)
D DIMER PPP FEU-MCNC: 0.5 UG/ML FEU (ref 0–0.5)
DIFFERENTIAL METHOD BLD: ABNORMAL
EOSINOPHIL # BLD AUTO: 0 10E9/L (ref 0–0.7)
EOSINOPHIL NFR BLD AUTO: 0.4 %
ERYTHROCYTE [DISTWIDTH] IN BLOOD BY AUTOMATED COUNT: 13.6 % (ref 10–15)
FIBRINOGEN PPP-MCNC: 277 MG/DL (ref 200–420)
GFR SERPL CREATININE-BSD FRML MDRD: 78 ML/MIN/{1.73_M2}
GLUCOSE BLDC GLUCOMTR-MCNC: 109 MG/DL (ref 70–99)
GLUCOSE BLDC GLUCOMTR-MCNC: 168 MG/DL (ref 70–99)
GLUCOSE SERPL-MCNC: 129 MG/DL (ref 70–99)
HCT VFR BLD AUTO: 29.1 % (ref 35–47)
HGB BLD-MCNC: 9.5 G/DL (ref 11.7–15.7)
IMM GRANULOCYTES # BLD: 0 10E9/L (ref 0–0.4)
IMM GRANULOCYTES NFR BLD: 0.4 %
INR PPP: 1.06 (ref 0.86–1.14)
LDH SERPL L TO P-CCNC: 176 U/L (ref 81–234)
LYMPHOCYTES # BLD AUTO: 2.9 10E9/L (ref 0.8–5.3)
LYMPHOCYTES NFR BLD AUTO: 36.2 %
MCH RBC QN AUTO: 30.1 PG (ref 26.5–33)
MCHC RBC AUTO-ENTMCNC: 32.6 G/DL (ref 31.5–36.5)
MCV RBC AUTO: 92 FL (ref 78–100)
MONOCYTES # BLD AUTO: 0.6 10E9/L (ref 0–1.3)
MONOCYTES NFR BLD AUTO: 7.3 %
NEUTROPHILS # BLD AUTO: 4.4 10E9/L (ref 1.6–8.3)
NEUTROPHILS NFR BLD AUTO: 55.3 %
NRBC # BLD AUTO: 0 10*3/UL
NRBC BLD AUTO-RTO: 0 /100
PLATELET # BLD AUTO: 333 10E9/L (ref 150–450)
POTASSIUM SERPL-SCNC: 3.7 MMOL/L (ref 3.4–5.3)
RBC # BLD AUTO: 3.16 10E12/L (ref 3.8–5.2)
RETICS # AUTO: 115.3 10E9/L (ref 25–95)
RETICS/RBC NFR AUTO: 3.7 % (ref 0.5–2)
SODIUM SERPL-SCNC: 141 MMOL/L (ref 133–144)
TROPONIN I SERPL-MCNC: <0.015 UG/L (ref 0–0.04)
WBC # BLD AUTO: 7.9 10E9/L (ref 4–11)

## 2020-11-12 PROCEDURE — 83615 LACTATE (LD) (LDH) ENZYME: CPT | Performed by: STUDENT IN AN ORGANIZED HEALTH CARE EDUCATION/TRAINING PROGRAM

## 2020-11-12 PROCEDURE — 85379 FIBRIN DEGRADATION QUANT: CPT | Performed by: STUDENT IN AN ORGANIZED HEALTH CARE EDUCATION/TRAINING PROGRAM

## 2020-11-12 PROCEDURE — 85025 COMPLETE CBC W/AUTO DIFF WBC: CPT | Performed by: STUDENT IN AN ORGANIZED HEALTH CARE EDUCATION/TRAINING PROGRAM

## 2020-11-12 PROCEDURE — 99238 HOSP IP/OBS DSCHRG MGMT 30/<: CPT | Mod: GC | Performed by: INTERNAL MEDICINE

## 2020-11-12 PROCEDURE — 84484 ASSAY OF TROPONIN QUANT: CPT | Performed by: STUDENT IN AN ORGANIZED HEALTH CARE EDUCATION/TRAINING PROGRAM

## 2020-11-12 PROCEDURE — 36415 COLL VENOUS BLD VENIPUNCTURE: CPT | Performed by: STUDENT IN AN ORGANIZED HEALTH CARE EDUCATION/TRAINING PROGRAM

## 2020-11-12 PROCEDURE — 250N000013 HC RX MED GY IP 250 OP 250 PS 637: Performed by: STUDENT IN AN ORGANIZED HEALTH CARE EDUCATION/TRAINING PROGRAM

## 2020-11-12 PROCEDURE — 85045 AUTOMATED RETICULOCYTE COUNT: CPT | Performed by: STUDENT IN AN ORGANIZED HEALTH CARE EDUCATION/TRAINING PROGRAM

## 2020-11-12 PROCEDURE — 85018 HEMOGLOBIN: CPT | Performed by: STUDENT IN AN ORGANIZED HEALTH CARE EDUCATION/TRAINING PROGRAM

## 2020-11-12 PROCEDURE — 85610 PROTHROMBIN TIME: CPT | Performed by: STUDENT IN AN ORGANIZED HEALTH CARE EDUCATION/TRAINING PROGRAM

## 2020-11-12 PROCEDURE — 999N001017 HC STATISTIC GLUCOSE BY METER IP

## 2020-11-12 PROCEDURE — 99233 SBSQ HOSP IP/OBS HIGH 50: CPT | Performed by: CLINICAL NURSE SPECIALIST

## 2020-11-12 PROCEDURE — 80048 BASIC METABOLIC PNL TOTAL CA: CPT | Performed by: STUDENT IN AN ORGANIZED HEALTH CARE EDUCATION/TRAINING PROGRAM

## 2020-11-12 PROCEDURE — 85384 FIBRINOGEN ACTIVITY: CPT | Performed by: STUDENT IN AN ORGANIZED HEALTH CARE EDUCATION/TRAINING PROGRAM

## 2020-11-12 PROCEDURE — 86140 C-REACTIVE PROTEIN: CPT | Performed by: STUDENT IN AN ORGANIZED HEALTH CARE EDUCATION/TRAINING PROGRAM

## 2020-11-12 RX ORDER — ACETAMINOPHEN 325 MG/1
975 TABLET ORAL EVERY 8 HOURS
COMMUNITY
Start: 2020-11-12

## 2020-11-12 RX ORDER — TRAMADOL HYDROCHLORIDE 50 MG/1
25-50 TABLET ORAL EVERY 6 HOURS PRN
Qty: 5 TABLET | Refills: 0 | Status: SHIPPED | OUTPATIENT
Start: 2020-11-12 | End: 2022-09-26

## 2020-11-12 RX ORDER — POTASSIUM CHLORIDE 1500 MG/1
20 TABLET, EXTENDED RELEASE ORAL DAILY
Qty: 90 TABLET | Refills: 1 | Status: SHIPPED | OUTPATIENT
Start: 2020-11-12

## 2020-11-12 RX ORDER — DULOXETIN HYDROCHLORIDE 30 MG/1
90 CAPSULE, DELAYED RELEASE ORAL DAILY
Status: CANCELLED | COMMUNITY
Start: 2020-11-12

## 2020-11-12 RX ORDER — ACETAMINOPHEN 325 MG/1
975 TABLET ORAL EVERY 8 HOURS
Status: CANCELLED | OUTPATIENT
Start: 2020-11-12

## 2020-11-12 RX ORDER — ALENDRONATE SODIUM 70 MG/1
70 TABLET ORAL
Qty: 4 TABLET | Refills: 0 | Status: SHIPPED | OUTPATIENT
Start: 2020-11-12

## 2020-11-12 RX ADMIN — LEVOTHYROXINE SODIUM 112 MCG: 0.11 TABLET ORAL at 07:52

## 2020-11-12 RX ADMIN — METHOCARBAMOL 500 MG: 500 TABLET, FILM COATED ORAL at 07:52

## 2020-11-12 RX ADMIN — POLYETHYLENE GLYCOL 3350 17 G: 17 POWDER, FOR SOLUTION ORAL at 07:52

## 2020-11-12 RX ADMIN — ACETAMINOPHEN 975 MG: 325 TABLET, FILM COATED ORAL at 04:36

## 2020-11-12 RX ADMIN — DOCUSATE SODIUM 50 MG AND SENNOSIDES 8.6 MG 1 TABLET: 8.6; 5 TABLET, FILM COATED ORAL at 07:52

## 2020-11-12 RX ADMIN — TOPIRAMATE 100 MG: 50 TABLET ORAL at 07:52

## 2020-11-12 RX ADMIN — METHOCARBAMOL 500 MG: 500 TABLET, FILM COATED ORAL at 02:18

## 2020-11-12 RX ADMIN — METOCLOPRAMIDE 10 MG: 10 TABLET ORAL at 07:53

## 2020-11-12 RX ADMIN — DULOXETINE HYDROCHLORIDE 90 MG: 30 CAPSULE, DELAYED RELEASE ORAL at 07:51

## 2020-11-12 RX ADMIN — HYDROCORTISONE 10 MG: 10 TABLET ORAL at 07:51

## 2020-11-12 RX ADMIN — PANCRELIPASE 72000 UNITS: 60000; 12000; 38000 CAPSULE, DELAYED RELEASE PELLETS ORAL at 09:59

## 2020-11-12 ASSESSMENT — ACTIVITIES OF DAILY LIVING (ADL)
ADLS_ACUITY_SCORE: 13

## 2020-11-12 NOTE — DISCHARGE INSTRUCTIONS
Medtronic 670 G in manual mode     Restart basal rate at 50% ( 0.25 units/hour), once BG >150 x 2.  Then if BG>180 for 12h, restart normal basal (0.5 units/hour).    Bolus using calculator in pump: 1 per 100 mg/dL glucose for BG >140 and carb cover 1 per 40 grams     CGMS to be resumed promptly after resuming home      Communicate with Dr. Maher via Mychart/email about BG trends/dosing adjustments after discharge.       We believe that good glucose control during Covid infection improves outcomes.  We don't have long range data to support this.  But, we have seen worse outcomes for Covid patients who have poor glucose control.  Please keep a low threshold for calling for help on glucose control.    ________________________________________________________________________________________________________________      IMM reviewed with patient by phone at 1012am on Thursday 11/12/2020 by RN Care Coordinator Deirdre Stewart

## 2020-11-12 NOTE — PLAN OF CARE
Pt A/OX4, VSS on RA.  Abd/back pain managed with  PRN robaxin with stated relief. Pt up independently. Pt to follow up tomorrow for lab draw to monitor hgb changes.  Followup with GI for EGD in 4-6 weeks. Pt has home ambulatory insulin pump.Currently off, and BGs have been less than 150. Pt to have  drive her home. Covid precaution education completed with patient. PIV removed. Belongings packed and sent home with patient. Tramadol picked up from pharmacy.

## 2020-11-12 NOTE — PLAN OF CARE
Alert and oriented. Up independently in the room. Complained of back pain with partial relief from Tylenol, Robaxin and icy hot patch. Manages  Ambulatory insulin pump independently. BG is 168 at 2 am, 0.3 units administered by pump. No BM this shift.  Possible discharge today. Vital signs stable. Will continue to monitor and follow paln of care

## 2020-11-12 NOTE — PROGRESS NOTES
Diabetes Brief note-    Discharge plan    Medtronic 670 G in manual mode     Restart basal rate at 50% ( 0.25 units/hour), once BG >150 x 2.  Then if BG>180 for 12h, restart normal basal (0.5 units/hour).    Bolus using calculator in pump: 1 per 100 mg/dL glucose for BG >140 and carb cover 1 per 40 grams     CGMS to be resumed promptly after resuming home      Communicate with Dr. Maher via [employer]/email about BG trends/dosing adjustments after discharge.

## 2020-11-12 NOTE — PROGRESS NOTES
[facility]     Progress Note - Sydni 2 Service        Date of Admission:  11/8/2020    Changes Today  - No EGD today per GI with plans to complete as outpatient  - Reduce steroids to PTA dosing and monitor for hypotension and additional signs of adrenal insufficiency  - Deescalate pain regimen with avoidance of IV pains meds in anticipation of potential discharge tomorrow, 11/12  - Pain regimen: Continue scheduled APAP TID, discontinue dilaudid IV, discontinue oral dilaudid, start tramadol and robaxin with emphasis on conservative measures and topical agents   - Likely discharge tomorrow, 11/12 with continued Hgb stability and in absence of signs of adrenal insufficiency on home steroid regimen    Assessment & Plan       Chantell Kidd is a 54 year old F with PMHx of TPIAT (2012), nomi-en-y gastric bypass, possible adrenal insufficiency on chronic steroids, osteoporosis on bisphosphonates, migraine, and MDD/anxiety admitted with acute blood loss anemia suspected 2/2 UGIB.     # Acute blood loss anemia suspected 2/2 UGIB    # Chronic anemia, baseline Hgb ~11.5  Patient presented with melena and hematochezia with Hgb drop from 10.1 to 8.7 suggestive of acute blood loss anemia 2/2 UGIB in setting of chronic steroid use, prior candida esophagitis, history of nomi-en-y bypass. BUN: Cr ratio not suggestive of upper GI bleed. Hemodynamically stable. Suspected causes: possible PUD vs gastritis vs esophagitis vs marginal ulcer in setting of nomi-en-y. No history of liver disease or varices. CT AP obtained with evidence of possible colitis without evidence of diverticular disease. Hgb trended and stable over duration of hospitalization. GI consulted. Treated with IV PPI with transition to PO PPI BID which she should continue for 8 weeks. Patient to follow up closely with PCP with Hgb monitoring.  - Maintain Two PIVs  - IVF resuscitation with LR at 100 mL/hr to maintain MAP>65  -  H&H monitoring daily with transfusion as needed to maintain Hgb above 7  - Documentation of stool output, quantity, color  - Avoid NSAIDs, hold ASA 81 mg  - Continue steroids in this patient with concern for precipitation of adrenal insufficieny  - GI consulted, appreciate assistance. Recommendations as below   -Pantoprazole 40 mg BID 8 weeks   -Outpatient EGD in 4-6 weeks after symptoms have dissipated, will need repeat negative covid test prior to procedure (EGD order placed)   -Continue to avoid NSAIDs   -Contact GI with significant change to hemodynamic stability in setting of significant overt GI bleeding prior to discharge  - Pain: APAP 1000 mg Q8H, discontinue dilaudid       # History of adrenal insufficiency  Followed by Dr. Maher. Previously suppressed AM cortisol and has been on empiric therapy for possible adrenal insufficiency, unclear if central vs transient. Most recent clinic note describes inability to wean steroids due to hypoglycemia episodes.   - Hydrocortisone reduced to PTA 10 mg qAM and 15 mg qPM   - Discussed with endocrinology on call. If concern for adrenal crisis will increase hydrocortisone to 50 mg q8h for ~two days, then reduce to 25 mg q12h for ~2 days, then resume PTA dosing. Will defer this for now with continued clinical stability.    # Post-pancreatectomy diabetes  # TPAIT (2012)  BG lability with hypoglycemia following admission. Endocrinology consulted for assistance.   - Endocrinology consulted, appreciate recommendations   - suspend basal rate while NPO, when eating and BG >150 x 2, resume at 50%    - bolus using calculator in pump: 1 per 100 mg/dL glucose for BG >140 and carb cover 1 per 40 grams    - BG q4h, or change to qAC, HS 0200 if eating becomes more significant    - CGMS to be resumed promptly after resuming home    - PRN IV insulin available in case of development of persistent hyperglycemia >200   - Hypoglycemia protocol    # COVID positive  Limited symptoms  including myalgias, fatigue, limited shortness of breath without desaturation.   - Lab monitoring per COVID order set  - No prophylactic anticoagulation in setting of active bleed  - Symptomatic management: APAP, tramadol, heat, ice, 1 dose dilaudid IV available for severe pain    # Chest pain  EKG without ST elevations or T wave inversions. No evidence of right heart strain. Troponin on AM draws for COVID monitoring negative. Primarily induced with cough, speech, some mild pleuritic component. Is at increased risk for DVT/PE in setting of COVID, however, only mild nature of symptoms currently without tachycardia or desaturation.   - Low threshold to CT PE with progression of symptoms    Chronic Medical Conditions  # Dysfunctional gastric emptying   - Continue PTA metoclopramide  # MDD/anxiety  - Continue PTA duloxetine, trazodone  # Hypothyroid  - Continue PTA levothyroxine       Diet: Snacks/Supplements Adult: Boost Breeze; With Meals  Regular Diet Adult  NPO at MN  Fluids: LR at 100/hr  Lines: PIV  DVT Prophylaxis: Pneumatic Compression Devices  [place]kee Catheter: not present  Code Status: Full Code           Disposition Plan   Expected discharge: 2 - 3 days, recommended to prior living arrangement once adequate pain management/ tolerating PO medications and hemoglobin stable.  Entered: Anne Marie Majano MD 11/11/2020, 6:06 PM       The patient's care was discussed with the Attending Physician, Dr. Ly.    Anne Marie Majano MD  [address]   Please see sign in/sign out for up to date coverage information  ______________________________________________________________________    Interval History   Nursing notes reviewed. No acute events overnight. No bowel movement as of this morning. Did have continued passage of maroon stool yesterday-- likely residual blood given stable Hgb. She continues to have pain in the back and head that  she says is only relieved by dilaudid. She declined to use PO pain medications yesterday but is open to transitioning to PO meds today. She denies any nausea or vomiting.     Data reviewed today: I reviewed all medications, new labs and imaging results over the last 24 hours. I personally reviewed no images or EKG's today.    Physical Exam   Vital Signs: Temp: 97.1  F (36.2  C) Temp src: Oral BP: 137/73 Pulse: 79   Resp: 16 SpO2: 97 % O2 Device: None (Room air)    Weight: 148 lbs 12.97 oz  Physical Exam unchanged  Constitutional:       General: She is not in acute distress.     Appearance: Normal appearance. She is not ill-appearing.   HENT:      Head: Normocephalic and atraumatic.      Mouth/Throat:      Mouth: Mucous membranes are moist.   Eyes:      General: No scleral icterus.     Extraocular Movements: Extraocular movements intact.   Cardiovascular:      Rate and Rhythm: Normal rate.      Pulses: Normal pulses.      Heart sounds: Normal heart sounds. No murmur. No gallop.    Pulmonary:      Effort: Pulmonary effort is normal. No respiratory distress.      Breath sounds: No wheezing.   Abdominal:      General: Abdomen is flat.      Palpations: Abdomen is soft.      Tenderness: There is abdominal tenderness. There is no guarding or rebound.      Comments: Abdominal scarring that is well-healed without erythema. TTP inferior to costal margin in band-like distribution, periumbilical tenderness   Musculoskeletal:         General: No deformity.   Skin:     General: Skin is warm and dry.      Coloration: Skin is not jaundiced.   Neurological:      General: No focal deficit present.      Mental Status: She is alert.   Psychiatric:         Thought Content: Thought content normal.     Data   Recent Labs   Lab 11/11/20  1757 11/11/20  0717 11/10/20  1747 11/10/20  0608 11/09/20  0741 11/09/20  0741 11/09/20  0646   WBC  --  8.6  --  8.2  --   --  10.0   HGB 9.7* 9.5* 9.5* 9.8*   < >  --  9.1*   MCV  --  91  --  90  --    --  92   PLT  --  288  --  299  --   --  276   INR  --  1.08  --  1.04  --  1.05  --    NA  --  138  --  139  --  141 141   POTASSIUM  --  3.4  --  3.5  --  3.6 3.5   CHLORIDE  --  107  --  108  --  110* 109   CO2  --  23  --  24  --  26 25   BUN  --  9  --  6*  --  5* 5*   CR  --  0.82  --  0.78  --  0.76 0.75   ANIONGAP  --  8  --  6  --  5 8   ELIZA  --  8.5  --  8.6  --  8.9 8.9   GLC  --  99  --  145*  --  100* 128*   ALBUMIN  --   --   --   --   --  3.2* 3.0*   PROTTOTAL  --   --   --   --   --  6.3* 6.1*   BILITOTAL  --   --   --   --   --  0.4 0.3   ALKPHOS  --   --   --   --   --  88 84   ALT  --   --   --   --   --  50 47   AST  --   --   --   --   --  34 31   TROPI  --   --   --  <0.015  --  <0.015  --     < > = values in this interval not displayed.

## 2020-11-12 NOTE — PROGRESS NOTES
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  Covid +        Glucose intermittently elevated and responded to correction boluses, without basal insulin infusing.    Discharge plan- pasted into AVS     Medtronic 670 G in manual mode     Restart basal rate at 50% ( 0.25 units/hour), once BG >150 x 2.  Then if BG>180 for 12h, restart normal basal (0.5 units/hour).     Bolus using calculator in pump: 1 per 100 mg/dL glucose for BG >140 and carb cover 1 per 40 grams     CGMS to be resumed promptly after resuming home    Patient also counseled on glucose control during Covid infection.       Communicate with Dr. Maher via [employer]/email about BG trends/dosing adjustments after discharge.          Plan discussed with patient, bedside RN, and note to primary team.  Staff message to Dr. Maher           Interval History:     The last 24 hours progress and nursing notes reviewed.  EGD cancelled and diet resumed yesterday.  Chantell tolerated mashed potatoes and pudding yesterday.  Bolused for  and , but basal remained suspended.  Fasting .  Back on her home hydrocortisone today.  Has ongoing back pain/ache attributed to Covid, worse when trying to move around.  Looks forward to going home and taking a bath.  Discussed plans for basal resumption.  She understands.  Explained known negative impact of poor BG control on Covid recovery.    She should have a low threshold to  "call Dr. Maher for insulin adjustments if BG uncontrolled.    Recent Labs   Lab 11/12/20  0958 11/12/20  0600 11/12/20  0210 11/11/20  2236 11/11/20  1702 11/11/20  1424 11/11/20  1208 11/11/20  0717 11/11/20  0717 11/10/20  0608 11/10/20  0608 11/09/20  0741 11/09/20  0741 11/09/20  0646 11/08/20  1309 11/08/20  1309   GLC  --  129*  --   --   --   --   --   --  99  --  145*  --  100* 128*  --  194*   *  --  168* 211* 94 107* 72   < >  --    < >  --    < >  --   --    < >  --     < > = values in this interval not displayed.           Nutrition:     Orders Placed This Encounter      Regular Diet Adult      Diet            PTA Regimen:   Medtronic 670G, with Guardian sensor- manual mode   Basal- 0.5 unit/hour  (versus 0.675 last April)  Bolus-    I:C ratio 40g,    mg/dL  target             Review of Systems:   See interval hx          Medications:   HC 25 mg q12h  (home cortef 10 mg BID)         Physical Exam:     Gen: Alert, in NAD, resting in bed  HEENT:  hearing intact to conversational volume  Resp: Unlabored, no cough observed  Neuro: oriented x3, communicating clearly  Psych: calm even mood  /62 (BP Location: Right arm)   Pulse 72   Temp 98  F (36.7  C) (Oral)   Resp 16   Ht 1.575 m (5' 2\")   Wt 67.5 kg (148 lb 13 oz)   SpO2 99%   BMI 27.22 kg/m               Data:     Lab Results   Component Value Date    A1C 7.3 11/09/2020    A1C 6.7 11/21/2019    A1C 8.2 06/11/2019    A1C Canceled, Test credited 06/10/2019    A1C 9.6 04/18/2019            Recent Labs   Lab Test 11/12/20  0600 11/11/20  0717    138   POTASSIUM 3.7 3.4   CHLORIDE 110* 107   CO2 27 23   ANIONGAP 4 8   * 99   BUN 8 9   CR 0.83 0.82   ELIZA 8.2* 8.5     CBC RESULTS:   Recent Labs   Lab Test 11/12/20  0600   WBC 7.9   RBC 3.16*   HGB 9.5*   HCT 29.1*   MCV 92   MCH 30.1   MCHC 32.6   RDW 13.6      I spent a total of 35 minutes bedside and on the inpatient unit managing the glycemic care of Chantell JOHNSON" Bernabe. Over 50% of my time on the unit was spent counseling the patient  and/or coordinating care regarding acute diabetes management.  See note for details.    Margot Vargas APRN [phone]  Diabetes Management job code [id number]

## 2020-11-12 NOTE — DISCHARGE SUMMARY
[facility]   Discharge Summary - Medicine & Pediatrics       Date of Admission:  11/8/2020  Date of Discharge:  11/12/2020  Discharging Provider: Juanita Ly  Discharge Service: Sydni Mustafa    Discharge Diagnoses   Acute blood loss anemia suspected 2/2 UGIB    Chronic anemia, baseline Hgb ~11.5  History of adrenal insufficiency  Post-pancreatectomy diabetes  TPIAT (2012)  COVID positive  Chest pain, noncardiac  Dysfunctional gastric emptying   MDD/anxiety  Hypothyroid      Follow-ups Needed After Discharge   Follow-up Appointments     Adult [facility] Follow-up and recommended labs and tests      Follow up with primary care provider, Ron Morgan, within 7   days for hospital follow- up.  The following labs/tests are recommended:   weekly hemoglobin  .    Follow up with GI at the [facility], within 4-6 weeks for EGD. The following   labs/tests are recommended: hemoglobin monitoring.    Appointments on [place] and/or [facility] (with [facility] or [facility]   provider or service). Call [phone] if you haven't heard regarding   these appointments within 7 days of discharge.           Unresulted Labs Ordered in the Past 30 Days of this Admission     No orders found from 10/9/2020 to 11/9/2020.        Discharge Disposition   Discharged to home  Condition at discharge: Stable      Hospital Course     Chantell Kidd is a 54 year old F with PMHx of TPIAT (2012), nomi-en-y gastric bypass, possible adrenal insufficiency on chronic steroids, osteoporosis on bisphosphonates, migraine, and MDD/anxiety admitted on 11/8/2020 with acute blood loss anemia suspected 2/2 UGIB. The following problems were addressed during her hospitalization:    # Acute blood loss anemia suspected 2/2 UGIB    # Chronic anemia, baseline Hgb ~11.5  Patient presented with melena and hematochezia with Hgb drop from 10.1 to 8.7 suggestive of acute blood loss anemia suspected secondary to upper GI bleed in  setting of chronic steroid use, prior candida esophagitis, history of nomi-en-y bypass. Interestingly, BUN: Cr ratio not suggestive of upper GI bleed. CT AP obtained with evidence of possible colitis without evidence of diverticular disease. She remained hemodynamically stable with stable hemoglobin following initial drop over the remainder of hospitalization. GI consulted and patient was treated with IV PPI with transition to PO PPI BID which she should continue for 8 weeks. Patient to follow up closely with PCP with Hgb monitoring.   - Follow up for weekly Hgb checks with first Hgb check on 11/16  - Hold ASA and alendronate pending GI evaluation   - Avoid NSAIDs  - Continue pantoprazole PO BID  - Patient educated on monitoring for signs and symptoms of bleed to return to ED   - Follow up with GI within 4-6 weeks for EGD  - Follow up with PCP, Dr. Morgan, within 7 days for routine    # History of adrenal insufficiency  Followed by Dr. Maher. Previously suppressed AM cortisol and has been on empiric therapy for possible adrenal insufficiency, unclear if central vs transient. Most recent clinic note describes inability to wean steroids due to hypoglycemia episodes. Treated with increased dose steroids initially without evidence of adrenal crisis following transition to PTA regimen prior to discharge.    # Post-pancreatectomy diabetes  # TPAIT (2012)  BG lability with hypoglycemia following admission. Endocrinology consulted for assistance.   - Endocrinology consulted, appreciate recommendations  - Resumption of PTA insulin pump and CGMS  - Follow up with Dr. Maher    # COVID positive  Limited symptoms including myalgias, fatigue, limited shortness of breath without desaturation.   - Discharged with short course PRN tramadol for symptomatic management    # Chest pain  EKG without ST elevations or T wave inversions. No evidence of right heart strain. Troponin as obtained for COVID monitoring negative. Primarily  induced with cough, speech, some mild pleuritic component. Stable to improving over hospitalization course.    Chronic Medical Conditions  # Dysfunctional gastric emptying   - Continue PTA metoclopramide  # MDD/anxiety  - Continue PTA duloxetine, trazodone  # Hypothyroid  - Continue PTA levothyroxine      Consultations This Hospital Stay   MEDICATION HISTORY IP PHARMACY CONSULT  GI LUMINAL ADULT IP CONSULT  ENDOCRINE DIABETES ADULT IP CONSULT  CNS DIABETES IP CONSULT  VASCULAR ACCESS CARE ADULT IP CONSULT  GI LUMINAL ADULT IP CONSULT    Code Status   Full Code       The patient was discussed with Dr. Ly.    Anne Marie Majano MD  [address] [facility] [facility]  [address] [facility] [place] [address]  Phone: [phone]  ______________________________________________________________________    Physical Exam   Vital Signs: Temp: 98  F (36.7  C) Temp src: Oral BP: 107/62 Pulse: 72   Resp: 16 SpO2: 99 % O2 Device: None (Room air)    Weight: 148 lbs 12.97 oz  Constitutional:       General: She is not in acute distress.     Appearance: Normal appearance. She is not ill-appearing.   HENT:      Head: Normocephalic and atraumatic.      Mouth/Throat:      Mouth: Mucous membranes are moist.   Eyes:      General: No scleral icterus.     Extraocular Movements: Extraocular movements intact.   Cardiovascular:      Rate and Rhythm: Normal rate.      Pulses: Normal pulses.      Heart sounds: Normal heart sounds. No murmur. No gallop.    Pulmonary:      Effort: Pulmonary effort is normal. No respiratory distress.      Breath sounds: No wheezing.   Abdominal:      General: Abdomen is flat.      Palpations: Abdomen is soft.      Tenderness: There is mild abdominal tenderness. There is no guarding or rebound.      Comments: Abdominal scarring that is well-healed without erythema. TTP inferior to costal margin in band-like distribution, periumbilical tenderness   Musculoskeletal:         General: No  deformity.   Skin:     General: Skin is warm and dry.      Coloration: Skin is not jaundiced.   Neurological:      General: No focal deficit present.      Mental Status: She is alert.   Psychiatric:         Thought Content: Thought content normal.       Primary Care Physician   Ron Morgan    Discharge Orders      Hemoglobin    Last Lab Result: Hemoglobin (g/dL)       Date                     Value                 11/12/2020               9.5 (L)          ----------     GASTROENTEROLOGY ADULT REF PROCEDURE ONLY      Reason for your hospital stay    Dear Chantell Kidd    Your were hospitalized at [facility] with upper GI bleeding and treated with IV acid reducing medicine.  Over your hospitalization your hemoglobin remained stable and your bleeding improved. Please continue your pantoprazole twice daily and follow up with GI in the next 4-6 weeks for an EGD. You will need to follow up closely with your primary care provider and have weekly hemoglobin checks. If you have persistent or worsening passage of blood in your stool, new or worsening lightheadedness or dizziness, worsening abdominal pain, vomiting of blood, palpitations, or shortness of breath please seek medical attention.    We are suggesting the following medication changes:  Tramadol short course as needed to manage pain related to COVID    Please get the following tests done:  Hemoglobin checks    Please set up an appointment with your primary care provider and GI to follow-up on the following:  GI bleeding    Thank you for allowing me and my team the privilege of caring for you today. Please let us know if there is anything else we can do for you so that we can be sure you are leaving completely satisfied with your care experience.    Your hospital unit at the time of discharge is 5A so if you have any questions please call the hospital at [phone] and ask to talk to a nurse on 5A.    Take care!    Anne Marie  Gretel  Internal Medicine Resident     Adult [facility] Follow-up and recommended labs and tests    Follow up with primary care provider, Ron Morgan, within 7 days for hospital follow- up.  The following labs/tests are recommended: weekly hemoglobin  .    Follow up with GI at the [facility], within 4-6 weeks for EGD. The following labs/tests are recommended: hemoglobin monitoring.    Appointments on [place] and/or [facility] (with [facility] or [facility] provider or service). Call [phone] if you haven't heard regarding these appointments within 7 days of discharge.     Activity    Your activity upon discharge: activity as tolerated     Full Code     Diet    Follow this diet upon discharge: Orders Placed This Encounter      Snacks/Supplements Adult: Boost Breeze; With Meals      Regular Diet Adult       Significant Results and Procedures   Most Recent 3 Hemoglobins:  Recent Labs   Lab Test 11/12/20  0600 11/11/20  1757 11/11/20  0717   HGB 9.5* 9.7* 9.5*     Most Recent D-dimer:  Recent Labs   Lab Test 11/12/20  0600   DD 0.5   ,   Results for orders placed or performed during the hospital encounter of 11/08/20   XR Chest Port 1 View    Narrative    XR CHEST PORT 1 VW  11/8/2020 2:25 PM      HISTORY: dyspnea    COMPARISON: Chest radiograph 9/3/2018    FINDINGS: AP view of the chest. Cardiac silhouette is within normal  limits. No acute airspace opacity. No pleural effusion or  pneumothorax. Cholecystectomy clips and surgical clips over the  midline upper abdomen no acute osseous abnormality.      Impression    IMPRESSION: No acute cardiopulmonary abnormality.    I have personally reviewed the examination and initial interpretation  and I agree with the findings.    KEISHA CHAUDHRY MD   CT Abdomen Pelvis w Contrast    Narrative    EXAMINATION: CT ABDOMEN PELVIS W CONTRAST, 11/8/2020 3:36 PM    TECHNIQUE:  Helical CT images from the lung bases through the  symphysis pubis were obtained with IV contrast.  Contrast dose: Isovue  370    COMPARISON: Abdomen and pelvis CT 6/10/2020    HISTORY: Abd pain, acute, generalized; abd pain x 3 days w/ maroon  rectal bleeding x this AM    FINDINGS:    Lung bases: Bibasilar atelectasis.    Liver: Normal parenchymal attenuation without focal mass.  Biliary system: Cholecystectomy. Stable pneumobilia. Post surgical  changes of Venessa-en-Y hepaticojejunostomy..  Pancreas: Post surgical changes of native pancreatectomy. Right lower  quadrant pancreatic transplant appears normal.  Stomach: Surgical changes of Venessa-en-Y gastric bypass. There is  enhancement of the distal small bowel near the anastomosis, similar to  prior.  Spleen: Splenectomy.  Adrenal glands: Within normal limits.  Kidneys: No focal mass, hydronephrosis, or stone.  Bladder: Distended otherwise within normal limits.  Reproductive organs: Postsurgical changes of hysterectomy.  Colon: There is increased colonic wall edema within the rectum,  sigmoid and descending colon. Diverticulosis without evidence of  diverticulitis.  Appendix: Appendix not definitively identified, but no secondary  findings of appendicitis.  Small Bowel: Within normal limits.  Lymph nodes: No intra-abdominal or pelvic lymphadenopathy.  Vasculature: Within normal limits.    Bones and soft tissues: No suspicious soft tissue mass or fluid  collection. No suspicious osseous lesion.      Impression    IMPRESSION:   1. Increased colonic wall thickening in the rectum, sigmoid and  descending colon, suggestive of colitis although, since the coon is  decompressed, this could be artifactual.  2. Stable extensive postsurgical changes in the abdomen. Stable  pneumobilia.    I have personally reviewed the examination and initial interpretation  and I agree with the findings.    BERONICA ARGUETA MD     *Note: Due to a large number of results and/or encounters for the requested time period, some results have not been displayed. A complete set of results can be found  in Results Review.       Discharge Medications   Current Discharge Medication List      START taking these medications    Details   acetaminophen (TYLENOL) 325 MG tablet Take 3 tablets (975 mg) by mouth every 8 hours  Qty:        traMADol (ULTRAM) 50 MG tablet Take 0.5-1 tablets (25-50 mg) by mouth every 6 hours as needed for moderate pain  Qty: 5 tablet, Refills: 0    Associated Diagnoses: Pain         CONTINUE these medications which have CHANGED    Details   alendronate (FOSAMAX) 70 MG tablet Take 1 tablet (70 mg) by mouth every 7 days On Sundays take first thing in the morning with plain water and remain upright for at least 30 minutes and until after first food of the day    Do not restart Fosamax (alendronate) until your difficulty swallowing has resolved and you have finished the entire course of fluconazole (Diflucan).  Qty: 4 tablet, Refills: 0    Comments: Hold pending GI evaluation in 4-6 weeks  Associated Diagnoses: Age-related osteoporosis without current pathological fracture      potassium chloride ER (KLOR-CON M) 20 MEQ CR tablet Take 1 tablet (20 mEq) by mouth daily  Qty: 90 tablet, Refills: 1    Comments: Hold until pcp follow-up  Associated Diagnoses: Hypokalemia         CONTINUE these medications which have NOT CHANGED    Details   alum & mag hydroxide-simethicone (MYLANTA/MAALOX) 200-200-25 MG CHEW chewable tablet Take 1 tablet by mouth 3 times daily as needed for indigestion      amylase-lipase-protease (CREON 12) 77124 units CPEP Take 6 to 8 capsules by mouth with meals and take 4 capsules with snacks. Needs up to 25 capsules per day  Qty: 750 capsule, Refills: 5    Associated Diagnoses: Exocrine pancreatic insufficiency      cyclobenzaprine (FLEXERIL) 10 MG tablet Take 10 mg by mouth 3 times daily as needed for muscle spasms      diclofenac (FLECTOR) 1.3 % Patch Place 1 patch onto the skin 2 times daily  Qty: 30 each, Refills: 1    Associated Diagnoses: Generalized abdominal pain       diclofenac (VOLTAREN) 1 % GEL 2 g Apply 2 g to skin four times a day as needed (to affected upper extremity joint(s)). Maximum 8g/day per joint, 16g/day total.      dicyclomine (BENTYL) 10 MG capsule Take 10 mg by mouth every 6 hours      dronabinol (MARINOL) 2.5 MG capsule Take 2 capsules (5 mg) by mouth 2 times daily as needed  Qty: 56 capsule, Refills: 5    Associated Diagnoses: Routine health maintenance      !! DULoxetine (CYMBALTA) 30 MG capsule Take 1 capsule (30 mg) by mouth daily With 60mg capsule for total dose of 90mg  Qty: 90 capsule, Refills: 0    Associated Diagnoses: Major depressive disorder, recurrent episode, moderate (H); CIERRA (generalized anxiety disorder)      !! DULoxetine (CYMBALTA) 60 MG EC capsule Take 1 capsule (60 mg) by mouth daily With 30mg capsule for total dose of 90mg  Qty: 90 capsule, Refills: 1    Associated Diagnoses: Major depressive disorder, recurrent episode, moderate (H); CIERRA (generalized anxiety disorder)      !! hydrocortisone (CORTEF) 10 MG tablet Take 10 mg in the morning and 10 mg along with a 5 mg tablet at bedtime for a total dose of 15 mg at bedtime. Watch for hypoglycemia recurrence.      !! hydrocortisone (CORTEF) 5 MG tablet Take 5 mg by mouth At Bedtime along with a 10 mg tablet for a total dose of 15 mg      Injection Device for insulin (NOVOPEN ECHO) RICARDO Use with   Insulin  Qty: 1 each, Refills: 0    Associated Diagnoses: Post-pancreatectomy diabetes (H)      insulin aspart (NOVOLOG VIAL) 100 UNITS/ML vial Use to fill insulin pump reservoir, needs 150 units every 3 days.  Qty: 20 mL, Refills: 11    Associated Diagnoses: Post-pancreatectomy diabetes (H)      insulin aspart (NOVOPEN ECHO) 100 UNIT/ML cartridge Correction coverage: Novolog Insulin subcutaneous before meals and HS. See pharmacy instructions  Qty: 3 mL, Refills: 3    Comments:  - 279: give 1 unit  - 379: give 2 unit  - 479: give 3 unit - Carb Coverage for meals and snacks : Novolog  1 unit for every 25g carbohydrate  Up to 10 units a day  Associated Diagnoses: Post-pancreatectomy diabetes (H)      levothyroxine (SYNTHROID/LEVOTHROID) 112 MCG tablet Take 1 tablet (112 mcg) by mouth daily  Qty: 90 tablet, Refills: 3    Associated Diagnoses: Hypothyroidism      linaclotide (LINZESS) 145 MCG capsule Take 145 mcg by mouth every morning (before breakfast) as needed      metoclopramide (REGLAN) 5 MG tablet Take 2 tablets (10 mg) by mouth 3 times daily  Qty: 180 tablet, Refills: 3    Associated Diagnoses: Nausea      Nutritional Supplements (BOOST HIGH PROTEIN) LIQD After above baseline labs are drawn, give: 6 mL/kg to maximum of 360 mL; the beverage is to be consumed within 5 minutes.  Refills: 0    Comments: If on pancreatic enzymes, patient may take home enzymes with Boost beverage.      Patients may take long acting insulin (Levemir and Lantus).      Patient should NOT cover Boost with Novolog, Humalog, Apidra, or regular insulin.  Associated Diagnoses: Post-pancreatectomy diabetes (H); Pancreatic insufficiency      ondansetron (ZOFRAN-ODT) 4 MG ODT tab DISSOLVE ONE TABLET ON THE TONGUE EVERY 6 HOURS AS NEEDED FOR NAUSEA AND VOMITING  Qty: 60 tablet, Refills: 2    Associated Diagnoses: Nausea      pantoprazole (PROTONIX) 40 MG EC tablet Take 1 tablet (40 mg) by mouth 2 times daily  Qty: 180 tablet, Refills: 3    Associated Diagnoses: H. pylori infection      polyethylene glycol (MIRALAX/GLYCOLAX) packet Take 1 packet by mouth 2 times daily as needed for constipation   Qty: 14 each, Refills: 5    Associated Diagnoses: Chronic constipation      senna-docusate (SENOKOT-S/PERICOLACE) 8.6-50 MG tablet Take 1-2 tablets by mouth daily as needed for constipation  Qty: 60 tablet, Refills: 3    Associated Diagnoses: Constipation      sodium chloride (OCEAN) 0.65 % nasal spray Spray 1 spray into both nostrils daily as needed for congestion  Qty: 30 mL, Refills: 0    Associated Diagnoses: Irritation of nose       sucralfate (CARAFATE) 1 GM tablet Take 1 tablet (1 g) by mouth 4 times daily (before meals and nightly)  Qty: 30 tablet, Refills: 0    Associated Diagnoses: Nausea      SUMAtriptan (IMITREX) 50 MG tablet Take 1 tablet (50 mg) by mouth at onset of headache for migraine Take 1 Tab by mouth Once as needed for Migraine Headache. May repeat after two hours.  Maximum dose 200 mg/24 hours.  Qty: 30 tablet, Refills: 1    Associated Diagnoses: Migraine      topiramate (TOPAMAX) 100 MG tablet Take 1 tablet (100 mg) by mouth 2 times daily  Qty: 180 tablet, Refills: 1    Associated Diagnoses: Migraine, unspecified, not intractable, without status migrainosus      traZODone (DESYREL) 100 MG tablet TAKE 1-2 TABLETS BY MOUTH AN HOUR BEFORE BEDTIME  Qty: 100 tablet, Refills: 1    Associated Diagnoses: Primary insomnia; Constipation, unspecified constipation type; Chronic back pain, unspecified back location, unspecified back pain laterality; Physical deconditioning      !! blood glucose (NO BRAND SPECIFIED) test strip EZ Next Contour strip.  Use to test blood sugar 6 times daily or as directed.  Qty: 150 each, Refills: 11    Associated Diagnoses: Post-pancreatectomy diabetes (H)      !! blood glucose (NO BRAND SPECIFIED) test strip Use to test blood sugar 8 times daily or as directed.  Qty: 200 strip, Refills: 1    Comments: Please dispense [place] Contour Next test strips  Associated Diagnoses: Post-pancreatectomy diabetes (H)      order for DME by [employer] [address] [address] [place], [place]  Ph: [phone]  Fax: [phone]    Nutren 1.5 @ 10 ml/hr with advancement by 10 ml/hr q 8 hours to goal rate of 40 ml/hr.  This will provide 1440 kcals (27 kcal/kg/day), 65 g PRO (1.2 g/kg/day), 730 mL H2O, 169 g CHO and no Fiber daily.     Qty: 1 Month, Refills: 3    Associated Diagnoses: Hypoglycemia; Nausea; Decreased oral intake; Exocrine pancreatic insufficiency; Type 1 diabetes mellitus with hypoglycemia and without  coma (H); Generalized abdominal pain; Post-pancreatectomy diabetes (H); Cell transplant; On enteral nutrition       !! - Potential duplicate medications found. Please discuss with provider.      STOP taking these medications       acetaminophen 500 MG CAPS Comments:   Reason for Stopping:         aspirin 81 MG tablet Comments:   Reason for Stopping:             Allergies   Allergies   Allergen Reactions     Corticosteroids Other (See Comments)     All oral, IV and injectable steroids are contraindicated (unless in life threatening situations) in [employer] transplant recipients. They can cause irreversible loss of islet cell function. Please contact patient's transplant care coordinator YURI Cortez RN at [phone]/pager [phone] and/or endocrinologist prior to administration.       Chocolate Flavor Rash     Breaks out when eats chocolate

## 2020-11-12 NOTE — PLAN OF CARE
"Time: 6615-3030   Reason for admission: GI bleed    Neuro: A&Ox4.  Cardiac: WDL.  Respiratory: Clear throughout. SpO2 97% on RA. Infreq dry cough.  GI/: Fair UO. LBM: 11/10  Skin: Visible skin CDI.  Activity: SBA  Pain: Headache and backache. Gave robaxin, tramadol, and tylenol w/slight improvement. Continued to report 10/10 pain, gave 0.2 IV dilaudid.  Diet: Reg diet, fair appetite. Denies N/V.  Lines: L PIV SL.  Labs: Covid-19 positive; hb.7; normoglycemic 94, 211.    Shift changes: Afebrile and VSS on RA. New PIV placed, IVMF discontinued after.  Plan of care: Possible discharge to home tomorrow.      /73 (BP Location: Right arm)   Pulse 79   Temp 97.1  F (36.2  C) (Oral)   Resp 16   Ht 1.575 m (5' 2\")   Wt 67.5 kg (148 lb 13 oz)   SpO2 97%   BMI 27.22 kg/m      "

## 2020-11-13 ENCOUNTER — HOSPITAL ENCOUNTER (OUTPATIENT)
Facility: CLINIC | Age: 55
End: 2020-11-13
Attending: INTERNAL MEDICINE | Admitting: INTERNAL MEDICINE
Payer: MEDICARE

## 2020-11-13 DIAGNOSIS — K92.2 GASTROINTESTINAL HEMORRHAGE, UNSPECIFIED GASTROINTESTINAL HEMORRHAGE TYPE: ICD-10-CM

## 2020-11-13 DIAGNOSIS — Z11.59 ENCOUNTER FOR SCREENING FOR OTHER VIRAL DISEASES: Primary | ICD-10-CM

## 2020-11-13 LAB — HGB BLD-MCNC: 10.6 G/DL (ref 11.7–15.7)

## 2020-11-13 PROCEDURE — 36415 COLL VENOUS BLD VENIPUNCTURE: CPT | Performed by: INTERNAL MEDICINE

## 2020-11-13 PROCEDURE — 85018 HEMOGLOBIN: CPT | Performed by: INTERNAL MEDICINE

## 2020-11-13 NOTE — PROGRESS NOTES
"[facility]: Post-Discharge Note  SITUATION                                                      Admission:    Admission Date: 11/08/20   Reason for Admission: Acute blood loss anemia suspected 2/2 UGIB  Discharge:   Discharge Date: 11/12/20  Discharge Diagnosis: Acute blood loss anemia suspected 2/2 UGIB  Discharge Service: pediatrics and medicine  Discharge Plan:  gi pcp    BACKGROUND                                                      Chantell Kidd is a 54 year old F with PMHx of TPIAT (2012), nomi-en-y gastric bypass, possible adrenal insufficiency on chronic steroids, osteoporosis on bisphosphonates, migraine, and MDD/anxiety admitted on 11/8/2020 with acute blood loss anemia suspected 2/2 UGIB. The following problems were addressed during her hospitalization    ASSESSMENT      Discharge Assessment  Patient reports symptoms are: Unchanged(had BM last night with blood.  body is very weak and sore. very short of breath when walking to bathroom coupled with chest pain. no fever due to taking tylenol,  Paitent states she is in \"so much pain, it hurst so bad- back stomach and head is bursting\")  Does the patient have all of their medications?: Yes(pain meds are gone)  Does patient know what their new medications are for?: Yes  Does patient have a follow-up appointment scheduled?: Yes  Does patient have any other questions or concerns?: Yes(in need of a stronger pain medication)    Post-op  Did the patient have surgery or a procedure: No  Fever: No(unknown due to taking tylenol around the clock)  Chills: Yes  Eating & Drinking: unable to tolerate solid foods(patient states she is not hungry and swallowing food makes her sick to her stomach)  PO Intake: regular diet  Bowel Function: constipation  Urinary Status: voiding without complaint/concerns        PLAN                                                      Outpatient Plan:      Future Appointments   Date Time Provider Department Center "   11/13/2020 12:10 PM LAB FIRST FLOOR [facility] SARA Jackson

## 2020-11-16 ENCOUNTER — TELEPHONE (OUTPATIENT)
Dept: INTERNAL MEDICINE | Facility: CLINIC | Age: 55
End: 2020-11-16

## 2020-11-16 ENCOUNTER — TELEPHONE (OUTPATIENT)
Dept: TRANSPLANT | Facility: CLINIC | Age: 55
End: 2020-11-16

## 2020-11-16 NOTE — TELEPHONE ENCOUNTER
Called Chantell to try and get her scheduled for endocrinology follow up with Dr Maher.She told me she felt terrible, exhausted and her whole body ached. I advised her to call her PCP as this is possibly related to her positive COVID-19 diagnosis. I told her I would see if Dr Maher could access any of her blood sugar /insulin use data as she says she did set this up on care link.I don't think she is using a CGM as she says she has not set this back up.She is using test strips but was unable to tell me BS as she was more focused on telling me about her general malaise.I will scheduled her for a follow up but at this point Dr. Maher has no availability until January.

## 2020-11-16 NOTE — TELEPHONE ENCOUNTER
Patient has not seen anyone in our clinic since June of 2019. Dr. Morgan not available right now, she will need a follow up with any provider available. Phone or virtual only due to covid status. I left a VM with this information and let her know that coordinators will reach out to help her schedule.   Tati Bernard, EMT at 1:45 PM on 11/16/2020.

## 2020-11-16 NOTE — TELEPHONE ENCOUNTER
TRIAGE CALL  HX   S/P  11/8/20 - 11/12/20  ADMIT FOR Acute blood loss anemia suspected 2/2 UGIB  COVID positive    CALL: Pt reports still not feeling well.  Reports weight loss, body aches, loss of appetite, fatigued    NO FEVER   NO SOB    PLAN Pt was waiting for [facility] f/u and needs to be seen. She wants message sent to [facility] & request call back for treatment/fpollowup plan. Please contact pt @ [phone]  Discharge Plan : Follow up with PCP, Dr. Morgan, within 7 days for routine

## 2020-11-17 ENCOUNTER — VIRTUAL VISIT (OUTPATIENT)
Dept: INTERNAL MEDICINE | Facility: CLINIC | Age: 55
End: 2020-11-17
Payer: COMMERCIAL

## 2020-11-17 DIAGNOSIS — U07.1 INFECTION DUE TO 2019 NOVEL CORONAVIRUS: ICD-10-CM

## 2020-11-17 DIAGNOSIS — K92.1 GASTROINTESTINAL HEMORRHAGE WITH MELENA: ICD-10-CM

## 2020-11-17 DIAGNOSIS — R11.0 NAUSEA: ICD-10-CM

## 2020-11-17 DIAGNOSIS — R51.9 INTRACTABLE HEADACHE, UNSPECIFIED CHRONICITY PATTERN, UNSPECIFIED HEADACHE TYPE: Primary | ICD-10-CM

## 2020-11-17 DIAGNOSIS — M54.9 BACK PAIN, UNSPECIFIED BACK LOCATION, UNSPECIFIED BACK PAIN LATERALITY, UNSPECIFIED CHRONICITY: ICD-10-CM

## 2020-11-17 PROCEDURE — 99443 PR PHYSICIAN TELEPHONE EVALUATION 21-30 MIN: CPT | Mod: 95 | Performed by: STUDENT IN AN ORGANIZED HEALTH CARE EDUCATION/TRAINING PROGRAM

## 2020-11-17 RX ORDER — ONDANSETRON 4 MG/1
4 TABLET, FILM COATED ORAL EVERY 8 HOURS PRN
Qty: 30 TABLET | Refills: 1 | Status: SHIPPED | OUTPATIENT
Start: 2020-11-17 | End: 2020-12-17

## 2020-11-17 RX ORDER — PROCHLORPERAZINE MALEATE 5 MG
5 TABLET ORAL EVERY 6 HOURS PRN
Qty: 30 TABLET | Refills: 1 | Status: SHIPPED | OUTPATIENT
Start: 2020-11-17 | End: 2020-12-02

## 2020-11-17 NOTE — PROGRESS NOTES
"                     PRIMARY [facility]ARE [facility]ENTER     This patient is being evaluated via a billable telephone visit; THIS VISIT WAS INITIATED BY THE PT, as AN ALTERNATIVE TO IN PERSON VISIT .       The patient has has been notified of following:      \"This billable telephone visit will be conducted via a call between you and your physician/provider. We have found that certain health care needs can be provided without the need for a physical exam.  This service lets us provide the care you need with a short phone conversation.  If a prescription is necessary we can send it directly to your pharmacy.  If lab work is needed we can place an order for that and you can then stop by our lab to have the test done at a later time. We can also place orders for a limited number of imaging tests if they are deemed urgently necessary.     If during the course of the call the physician/provider feels a telephone visit is not appropriate, you will not be charged for this service.\"     Due to efforts to reduce the spread of COVID-19 in the clinic, state, nation, virtual visits are encouraged currently. Patient understands that diagnose and advice is limited by the inability to exam him/her/them face-to-face.    Patient has given verbal consent for this virtual audio visit? Yes  Did patient initiate this virtual visit? Yes      Person(s) spoken to: Chantell Kidd    This was a synchronous virtual visit  Time call initiated:  0920  Time call ended:  0950  Total length of call: 30 min    Staffed with: Dr. Mccormick     SUBJECTIVE:     Chantell Kidd is a 54 year old F with PMHx of TPIAT (2012), nomi-en-y gastric bypass, possible adrenal insufficiency on chronic steroids, osteoporosis on bisphosphonates, migraine, and MDD/anxiety admitted on 11/8/2020 with acute blood loss anemia suspected 2/2 UGIB here for routine post-hospitalization follow-up and discussion of pain management and additional concerns as below.    Patient was seen and discharged " personally by me under attending supervision while inpatient.    GI Bleed  Admitted for inpatient management of acute blood loss anemia secondary to likely upper GI bleeding from 11/8-11/12.  She was treated with a PPI drip with transition to p.o. p.o. PPI twice daily at discharge.  She did have an initial hemoglobin drop but hemoglobin remained stable over 3 days following admission.  Had continual improvement in passage of bright red blood per rectum with continued passage of some melanotic stool.  Last episode of small bright red blood passed on 11/16.  She did follow-up for hemoglobin after discharge with some improvement from 9.5 on 11/12 while inpatient to 10.6 on 11/13.  She does plan to follow-up for repeat hemoglobin check on 11/18 at [facility].  In terms of abdominal pain, she has continued to have some abdominal pain although it has migrated from the epigastrium to the right lower quadrant.  No heartburn or acid reflux symptoms.  No hematemesis.    Pain control  Chantell's primary concern this morning is with pain control which she feels has been inadequate.  She describes intractable frontal headache as well as bilateral low back pain, primarily in the paraspinal muscles.  She is continue to use her chronic pain regimen including scheduled Tylenol 1 g 3 times daily, duloxetine 90 mg p.o. daily, cyclobenzaprine 10 mg 3 times daily, lidocaine patches, IcyHot patches, BenGay.  She has not been using topical diclofenac as she feels the other topicals have been more effective.  She was discharged with a limited number of tramadol tablets which she says was completely ineffective.  She notes only having improvement in pain with administration of IV Dilaudid and denies significant improvement in pain control with administration of p.o. Dilaudid or oxycodone.  She says pain is quite severe and impairs her appetite in addition to contributing to nausea.  She is concerned that she will be unable to take in  adequate p.o. with ongoing pain.    Symptomatic Covid positivity  Tested for Covid on presentation on 11/8 with Covid positivity.  Did have symptom onset approximately 4 days prior to presentation.    Nausea/vomiting  She continues to have nausea and some vomiting without hematemesis.  Denies any particular heartburn symptoms but does note some irritation and burning with vomiting.  She notes that it is somewhat difficult to tolerate p.o. intake and has attempted to taken small amounts of foods including chicken soup.  She is started taking high-protein dietary supplement/shakes.  She primarily attributes her nausea and impaired p.o. intake to alterations in taste associated with COVID-19 as well as nausea due to significant pain.  She is concerned about potential weight loss of up to 12 to 14 pounds over the last week.  Does have weight loss as documented in epic of approximately 5.5 pounds.    Post pancreatectomy diabetes  She notes adequate control of blood sugars following hospital discharge.  She says blood sugars have been shared with primary endocrinology clinic.       MEDICATIONS/ALLERGIES:     Medications and allergies reviewed by me today.      ROS:     Seven-point review of systems completed and negative except as described in above HPI.     OBJECTIVE:     There were no vitals taken for this visit.   Wt Readings from Last 1 Encounters:   11/08/20 67.5 kg (148 lb 13 oz)     Speech and mentation intact.  Patient somewhat tearful on encounter.  Exam otherwise limited secondary to completion of visit as telephone encounter in setting of ongoing COVID-19 pandemic.      ASSESSMENT/PLAN:     Chantell was seen today for recheck medication.    Diagnoses and all orders for this visit:    Intractable headache, unspecified chronicity pattern, unspecified headache type  Back pain, unspecified back location, unspecified back pain laterality, unspecified chronicity  Patient reports that pain is severe and includes  intractable frontal headache and back pain bilaterally in paraspinal muscles.  She is on quite an extensive pain regimen at baseline with Cymbalta 90 mg daily, cyclobenzaprine, dronabinol, topicals (including lidocaine, icy hot, BenGay, diclofenac).  She was discharged with 5 tablets of tramadol which she completed however notes no relief from it.  No NSAID use given GI bleeding and Covid positivity.  She is not on chronic opioids. She does note that she has essentially only had any pain relief with IV Dilaudid while inpatient.  She felt that Dilaudid administered p.o. was inadequate in both timing to the onset of pain relief and duration of pain relief.  She requested that home visits with nursing be set up for IV Dilaudid administration and was quite upset when this was declined in favor of return to the ED for further pain control if needed.  She requests that IV pain control with administration of dilaudid via home nurse similar to that received post transplant should be resumed.  She expressed feelings that providers lacked insight and empathy into her degree of pain and ultimately she truncated the telephone visit.  -Recommended increasing duloxetine from 90 mg p.o. daily to 120 mg p.o. daily in the acute setting  -Continue cyclobenzaprine, dronabinol, topical agents as described  -Unfortunately we are unable to use NSAIDs in this setting given Covid positivity and GI bleeding  -Follow-up in clinic for review of persistent symptoms via virtual visit as needed  -Follow-up with Dr. Morgan in 4 weeks  -She should return to ER with persistent or progressive pain burden    Infection due to 2019 novel coronavirus  Continue supportive care at home with return to the ED for worsening symptoms including shortness of breath.  Reviewed need for continued isolation and use of appropriate precautions including mask and distancing while obtaining follow-up labs including hemoglobin check as above.    Gastrointestinal  hemorrhage with melena  Hemoglobin improving following discharge.  Does continue to have some bright red blood per stool although this seems to have resolved in the last 24 hours.  Continues to have some melena.  She has not undergone EGD and should follow-up with GI as previously planned.  She should follow-up for continued hemoglobin checks on a weekly basis as previously planned.  Continue to hold aspirin and alendronate pending repeat GI evaluation.  -Avoid NSAIDs  -Continue pantoprazole p.o. twice daily  -Hold ASA and alendronate  -Continue weekly hemoglobin checks with next check on 11/18  -Follow-up with GI as scheduled on 12/10 for EGD    Nausea  Encouraged use of antiemetics for symptomatic management.  If patient is unable to tolerate adequate p.o. she may require return to ED for IV fluids.  She was encouraged to continue to take in p.o. fluids as tolerated.    -     prochlorperazine (COMPAZINE) 5 MG tablet; Take 1 tablet (5 mg) by mouth every 6 hours as needed for nausea or vomiting  -     ondansetron (ZOFRAN) 4 MG tablet; Take 1 tablet (4 mg) by mouth every 8 hours as needed for nausea         Pt should return to clinic for f/u with Dr. Morgan in 4 weeks or with an alternate provider in clinic sooner if needed.     Options for treatment and follow-up care were reviewed with the patient. Chantell Kidd engaged in the decision making process and verbalized understanding of the options discussed and agreed with the final plan.    Anne Marie Majano MD  PGY-3, Internal Medicine  Nov 17, 2020    Pt plan of care discussed with Dr. Mccormick.     Teaching Physician Note:    I, Dr. Mccormick, was immediately accessible to the resident, and agree with the residents's findings and plan of care, as documented in the resident's note.     Svitlana Mccormick M.D.  Internal Medicine  [facility]         Patients: if you have questions or concerns about this progress note, please discuss them with the  provider at a future office visit.

## 2020-11-17 NOTE — NURSING NOTE
Chief Complaint   Patient presents with     Recheck Medication     Patient calls in for a follow up. Would like to discuss COVID symptoms.          Rajat Piña MA on 11/17/2020 at 8:08 AM

## 2020-11-17 NOTE — PROGRESS NOTES
"Chantell Kidd is a 54 year old female who is being evaluated via a billable telephone visit.      The patient has been notified of following:     \"This telephone visit will be conducted via a call between you and your physician/provider. We have found that certain health care needs can be provided without the need for a physical exam.  This service lets us provide the care you need with a short phone conversation.  If a prescription is necessary we can send it directly to your pharmacy.  If lab work is needed we can place an order for that and you can then stop by our lab to have the test done at a later time.    Telephone visits are billed at different rates depending on your insurance coverage. During this emergency period, for some insurers they may be billed the same as an in-person visit.  Please reach out to your insurance provider with any questions.    If during the course of the call the physician/provider feels a telephone visit is not appropriate, you will not be charged for this service.\"    Patient has given verbal consent for Telephone visit?  Yes    What phone number would you like to be contacted at? [phone]    How would you like to obtain your AVS? MyChart    "

## 2020-11-17 NOTE — PATIENT INSTRUCTIONS
[facility] Medication Refill Request Information:  * Please contact your pharmacy regarding ANY request for medication refills.  ** [facility] Prescription Fax = [phone]  * Please allow 3 business days for routine medication refills.  * Please allow 5 business days for controlled substance medication refills.     [facility] Test Result notification information:  *You will be notified with in 7-10 days of your appointment day regarding the results of your test.  If you are on MyChart you will be notified as soon as the provider has reviewed the results and signed off on them.    [facility]: [phone]

## 2020-11-23 ENCOUNTER — TELEPHONE (OUTPATIENT)
Dept: TRANSPLANT | Facility: CLINIC | Age: 55
End: 2020-11-23

## 2020-11-24 ENCOUNTER — TELEPHONE (OUTPATIENT)
Dept: TRANSPLANT | Facility: CLINIC | Age: 55
End: 2020-11-24

## 2020-11-24 NOTE — TELEPHONE ENCOUNTER
It does not appear Chantell has been able to upload any data to the [employer] care link site. I left her a message asking her to call me or her contact at [employer]  for assistance.

## 2020-11-25 DIAGNOSIS — Z90.410 POST-PANCREATECTOMY DIABETES (H): ICD-10-CM

## 2020-11-25 DIAGNOSIS — E13.9 POST-PANCREATECTOMY DIABETES (H): ICD-10-CM

## 2020-11-25 DIAGNOSIS — E89.1 POST-PANCREATECTOMY DIABETES (H): ICD-10-CM

## 2020-12-01 ENCOUNTER — TELEPHONE (OUTPATIENT)
Dept: TRANSPLANT | Facility: CLINIC | Age: 55
End: 2020-12-01

## 2020-12-06 ENCOUNTER — HEALTH MAINTENANCE LETTER (OUTPATIENT)
Age: 55
End: 2020-12-06

## 2020-12-09 ENCOUNTER — HOSPITAL ENCOUNTER (OUTPATIENT)
Facility: CLINIC | Age: 55
End: 2020-12-09
Attending: INTERNAL MEDICINE | Admitting: INTERNAL MEDICINE
Payer: COMMERCIAL

## 2021-01-04 ENCOUNTER — TELEPHONE (OUTPATIENT)
Dept: TRANSPLANT | Facility: CLINIC | Age: 56
End: 2021-01-04

## 2021-01-04 NOTE — TELEPHONE ENCOUNTER
"I called Chantell in response to an Email I received at 4:19 pm. She states she \"texted \" my work number this morning.I explained as I have done previously that my work number does not receive texts. She states she is out of test strips. When I was verifying what she needs and how many times she test a day ,she said she tests 6 times a day to \"calibrate\" her Guardian CGM. She is required to do this because her BS are running high. She states 400-500 . I asked her gently why she has not notified Dr Maher of this and she said \" I thought you could see my numbers\" . I explained that Dr Maher does not look at her uploaded numbers unless Chantell alerts her to issues.I requested that she make sure to upload her pump and sensor tonight. Once this is done Dr Maher can decide if she needs Insulin adjustments and I can then send  a new updated Novolog prescription.I verified she has just filled he pump and that she has enough for at least 2-3 days.Rx sent for the test strips.  "

## 2021-01-07 ENCOUNTER — TELEPHONE (OUTPATIENT)
Dept: TRANSPLANT | Facility: CLINIC | Age: 56
End: 2021-01-07

## 2021-01-07 NOTE — TELEPHONE ENCOUNTER
Called Chantell on Wednesday as she had not uploaded her pump ot CGM for review. She did not answer and her voice mail was full. I sent her a text message asking her to call me . She texted back stating she was at the dentist and would call me Thursday am. She has not yet done so.I tried calling her at 5:40 but had to leave a voice mail with my call back number.

## 2021-01-08 DIAGNOSIS — Z90.410 POST-PANCREATECTOMY DIABETES (H): ICD-10-CM

## 2021-01-08 DIAGNOSIS — E13.9 POST-PANCREATECTOMY DIABETES (H): ICD-10-CM

## 2021-01-08 DIAGNOSIS — E89.1 POST-PANCREATECTOMY DIABETES (H): ICD-10-CM

## 2021-01-13 ENCOUNTER — TELEPHONE (OUTPATIENT)
Dept: TRANSPLANT | Facility: CLINIC | Age: 56
End: 2021-01-13

## 2021-01-13 NOTE — TELEPHONE ENCOUNTER
Late documentation of a call from Friday Jan 8th. I spoke again to Chantell to ask her if she had uploaded her CGM and Insulin pump.She stated she was waiting for the [employer] squad to call th help her with her lap top. This was supposed to be at 2pm and she would let me know after this if she able to upload her data. Per Dr Maher I refilled one vial of novalog Insulin for her pump so she does not run out before she sees Dr. Maher IN PERSON next week. I told her that Dr. Maher can not and will not continue to refill her Insulins if she does not have updated BS numbers.

## 2021-01-19 ENCOUNTER — TELEPHONE (OUTPATIENT)
Dept: TRANSPLANT | Facility: CLINIC | Age: 56
End: 2021-01-19

## 2021-01-19 NOTE — TELEPHONE ENCOUNTER
LVM for Chantell asking her , again to up load her pump and CGM ahead of her visit with Dr Maher. Also said she needs to come in person for this visit and asked her to call me back to confirm this.

## 2021-01-26 ENCOUNTER — TELEPHONE (OUTPATIENT)
Dept: TRANSPLANT | Facility: CLINIC | Age: 56
End: 2021-01-26

## 2021-01-26 NOTE — TELEPHONE ENCOUNTER
E-mail below sent to Chantell today after leaving her a voice message.    Yang Abdul,  Below is the My chart message I sent last week. I spoke to Dr Maher and she said if you are able to upload your pump and CGM we will try and find an earlier date to see you virtually,  Why don t you give [employer] a call and see if they can help you ?     I will wait to hear from you,  Best wishes,  Leslie

## 2021-01-27 ENCOUNTER — TELEPHONE (OUTPATIENT)
Dept: GASTROENTEROLOGY | Facility: OUTPATIENT CENTER | Age: 56
End: 2021-01-27

## 2021-01-27 NOTE — TELEPHONE ENCOUNTER
Caller: Us calling pt to cancel/reschedule 2/4-Rolando EGD-CS apptmt    Reason for cancel? Due to provider schedule change    Rescheduled? Lvm for pt to call us back to reschedule apptmt     Will patient call back to reschedule?n/a

## 2021-02-01 ENCOUNTER — TELEPHONE (OUTPATIENT)
Dept: GASTROENTEROLOGY | Facility: OUTPATIENT CENTER | Age: 56
End: 2021-02-01

## 2021-02-24 DIAGNOSIS — F51.01 PRIMARY INSOMNIA: ICD-10-CM

## 2021-02-24 DIAGNOSIS — M54.9 CHRONIC BACK PAIN, UNSPECIFIED BACK LOCATION, UNSPECIFIED BACK PAIN LATERALITY: ICD-10-CM

## 2021-02-24 DIAGNOSIS — R53.81 PHYSICAL DECONDITIONING: ICD-10-CM

## 2021-02-24 DIAGNOSIS — G89.29 CHRONIC BACK PAIN, UNSPECIFIED BACK LOCATION, UNSPECIFIED BACK PAIN LATERALITY: ICD-10-CM

## 2021-02-24 DIAGNOSIS — K59.00 CONSTIPATION, UNSPECIFIED CONSTIPATION TYPE: ICD-10-CM

## 2021-02-25 ENCOUNTER — TELEPHONE (OUTPATIENT)
Dept: INTERNAL MEDICINE | Facility: CLINIC | Age: 56
End: 2021-02-25

## 2021-02-25 DIAGNOSIS — Z90.410 POST-PANCREATECTOMY DIABETES (H): ICD-10-CM

## 2021-02-25 DIAGNOSIS — E89.1 POST-PANCREATECTOMY DIABETES (H): ICD-10-CM

## 2021-02-25 DIAGNOSIS — E13.9 POST-PANCREATECTOMY DIABETES (H): ICD-10-CM

## 2021-02-25 RX ORDER — TRAZODONE HYDROCHLORIDE 100 MG/1
TABLET ORAL
Qty: 100 TABLET | Refills: 2 | Status: SHIPPED | OUTPATIENT
Start: 2021-02-25

## 2021-02-25 NOTE — TELEPHONE ENCOUNTER
traZODone (DESYREL) 100 MG tablet  Last Written Prescription Date:  2/4/2020  Last Fill Quantity: 100,   # refills: 1  Last Office Visit : 11/17/2020  Future Office visit:  None  100 Tabs, 2 Refills sent to pharm 2/25/2020      Kalli Ge RN  Central Triage Red Flags/Med Refills  Warnings Override History for traZODone (DESYREL) 100 MG tablet [[id number]]    Overridden by Anne Marie Majano MD on Nov 12, 2020 9:13 AM   Drug-Drug   1. SEROTONERGIC OPIOIDS (HIGH RISK) / SEROTONERGIC NON-OPIOID CNS DEPRESSANTS [Level: Major] [Reason: Benefit outweighs risk]   Other Orders: traMADol (ULTRAM) 50 MG tablet         Overridden by Anne Marie Majano MD on Nov 12, 2020 9:13 AM   Drug-Drug   1. TRAZODONE / SEROTONIN/NOREPINEPHRINE REUPTAKE INHIBITORS [Level: Major] [Reason: Tolerated medication/side effects in past]   Other Orders: DULoxetine (CYMBALTA) 30 MG capsule      2. TRAZODONE / SEROTONIN/NOREPINEPHRINE REUPTAKE INHIBITORS [Level: Major] [Reason: Tolerated medication/side effects in past]   Other Orders: DULoxetine (CYMBALTA) 60 MG EC capsule         Overridden by Ron Morgan MD on Feb 4, 2020 8:13 AM   Drug-Drug   1. TRAZODONE / SEROTONIN/NOREPINEPHRINE REUPTAKE INHIBITORS [Level: Major]   Other Orders: DULoxetine (CYMBALTA) 30 MG capsule      2. TRAZODONE / SEROTONIN/NOREPINEPHRINE REUPTAKE INHIBITORS [Level: Major]   Other Orders: DULoxetine (CYMBALTA) 60 MG EC capsule

## 2021-02-25 NOTE — TELEPHONE ENCOUNTER
Health Call Center    Phone Message    May a detailed message be left on voicemail: yes     Reason for Call: Medication Refill Request    Has the patient contacted the pharmacy for the refill? Yes   Name of medication being requested: insulin aspart (NOVOLOG VIAL) 100 UNITS/ML vial, blood glucose (NO BRAND SPECIFIED) test strip, and insulin glargine (Lantus)  Provider who prescribed the medication: Amena Maher  Pharmacy: [id number] [place], [place] - [address]  Date medication is needed: 02/25/2021, Patient states that she needs these refills. Patient states she was notified by the pharmacy to contact the clinic because they have not received a response back. Patient was advised that Dr. Maher prescribed the prescription. Patient states Dr. Ulloa advised her to get the prescription through Dr. Morgan since she will not her Dr. Ulloa until April. Please contact patient once medication refill has been sent.     Action Taken: Message routed to:  [facility]    Travel Screening: Not Applicable

## 2021-03-08 ENCOUNTER — TELEPHONE (OUTPATIENT)
Dept: INTERNAL MEDICINE | Facility: CLINIC | Age: 56
End: 2021-03-08

## 2021-03-08 ENCOUNTER — HOSPITAL ENCOUNTER (OUTPATIENT)
Facility: CLINIC | Age: 56
End: 2021-03-08
Attending: INTERNAL MEDICINE | Admitting: INTERNAL MEDICINE
Payer: COMMERCIAL

## 2021-03-08 ENCOUNTER — TELEPHONE (OUTPATIENT)
Dept: GASTROENTEROLOGY | Facility: OUTPATIENT CENTER | Age: 56
End: 2021-03-08

## 2021-03-08 NOTE — TELEPHONE ENCOUNTER
Patient is scheduled for EGD with Dr. RAMIREZ    Spoke with: PATIENT    Date of Procedure: 4/1/2021    [place] [facility] [place]: [place]    Conscious Sedation- Needs  for 6 hours after the procedure  MAC/General-Needs  for 24 hours after procedure    Pre-op for Unit J MAC and OR: N    (if yes advise patient they will need a pre-op prior to procedure)      Is patient on blood thinners? -: N   (If yes- inform patient to follow up with PCP or provider for follow up instructions)     Informed patient they will need an adult  : Y  Cannot take any type of public or medical transportation alone    Informed Patient of COVID Test Requirement: Y-SCHED    Preferred Pharmacy for Pre Prescription: N/A    Confirmed Nurse will call to complete assessment: N    Additional comments: N/A

## 2021-03-08 NOTE — TELEPHONE ENCOUNTER
"[facility] Call Center    Phone Message    May a detailed message be left on voicemail: yes     Reason for Call: Other: Pt called looking for orders for \"upp and lower\" endoscopy. Pt stated shes scheduled for 04/01/21. please follow up with pt     Action Taken: Message routed to:  [facility]    Travel Screening: Not Applicable     I will need to see her before putting in any referrals as I have not seen her in the past 2 years.  Have her schedule an appointment in clinic.  DARION                                                                      "

## 2021-03-10 NOTE — TELEPHONE ENCOUNTER
I called and left message, patient requires office appt to check in per provider note. I left number to call back and make appointment.   Tati Bernard, EMT at 3:35 PM on 3/10/2021.

## 2021-03-16 DIAGNOSIS — Z90.410 POST-PANCREATECTOMY DIABETES (H): ICD-10-CM

## 2021-03-16 DIAGNOSIS — E89.1 POST-PANCREATECTOMY DIABETES (H): ICD-10-CM

## 2021-03-16 DIAGNOSIS — Z11.59 ENCOUNTER FOR SCREENING FOR OTHER VIRAL DISEASES: ICD-10-CM

## 2021-03-16 DIAGNOSIS — E13.9 POST-PANCREATECTOMY DIABETES (H): ICD-10-CM

## 2021-03-17 ENCOUNTER — DOCUMENTATION ONLY (OUTPATIENT)
Dept: CARE COORDINATION | Facility: CLINIC | Age: 56
End: 2021-03-17

## 2021-03-17 NOTE — TELEPHONE ENCOUNTER
insulin aspart (NOVOLOG VIAL) 100 UNITS/ML vial      Last Written Prescription Date:  2-25-21  Last Fill Quantity: 10 ml,   # refills: 0  Last Office Visit : 11-17-20  Future Office visit:  3-26-21    See RF note 2-25-21    Next endo appt 4-1-21    Routing refill request to provider for review/approval because:  Insulin pump- last fill by Dr. Morgan

## 2021-03-31 ENCOUNTER — TELEPHONE (OUTPATIENT)
Dept: GASTROENTEROLOGY | Facility: OUTPATIENT CENTER | Age: 56
End: 2021-03-31

## 2021-03-31 NOTE — TELEPHONE ENCOUNTER
Caller: PATIENT    Reason for cancel?: CX: 4/1 DUE TO NO     Rescheduled?: N    Will patient call back to reschedule?: Y

## 2021-04-01 ENCOUNTER — OFFICE VISIT (OUTPATIENT)
Dept: TRANSPLANT | Facility: CLINIC | Age: 56
End: 2021-04-01
Attending: PEDIATRICS
Payer: COMMERCIAL

## 2021-04-01 VITALS
WEIGHT: 147.5 LBS | OXYGEN SATURATION: 98 % | BODY MASS INDEX: 26.98 KG/M2 | DIASTOLIC BLOOD PRESSURE: 74 MMHG | HEART RATE: 71 BPM | SYSTOLIC BLOOD PRESSURE: 120 MMHG

## 2021-04-01 DIAGNOSIS — E10.649 TYPE 1 DIABETES MELLITUS WITH HYPOGLYCEMIA AND WITHOUT COMA (H): Primary | ICD-10-CM

## 2021-04-01 LAB — HBA1C MFR BLD: 9 % (ref 4.3–6)

## 2021-04-01 PROCEDURE — 83036 HEMOGLOBIN GLYCOSYLATED A1C: CPT | Performed by: PEDIATRICS

## 2021-04-01 PROCEDURE — 99215 OFFICE O/P EST HI 40 MIN: CPT | Performed by: PEDIATRICS

## 2021-04-01 ASSESSMENT — PAIN SCALES - GENERAL: PAINLEVEL: MODERATE PAIN (5)

## 2021-04-01 NOTE — NURSING NOTE
Chief Complaint   Patient presents with     RECHECK     TP-IAT F/U     Blood pressure 120/74, pulse 71, weight 66.9 kg (147 lb 8 oz), SpO2 98 %, not currently breastfeeding.    Honey Farmer, CMA

## 2021-04-01 NOTE — LETTER
4/1/2021         RE: Chantell Kidd  [address]  [facility] [id number]        Dear Colleague,    Thank you for referring your patient, Chantell Kidd, to the [facility]. Please see a copy of my visit note below.    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     Post-pancreatectomy diabetes (H)     Type 1 diabetes mellitus with hypoglycemia and without coma (H)     Migraine     Abdominal muscle strain     CIERRA (generalized anxiety disorder)     Major depressive disorder, recurrent episode, moderate (H)     CMC DJD(carpometacarpal degenerative joint disease), localized primary     Exocrine pancreatic insufficiency     Hypothyroidism     Hypoglycemia     Mood disorder due to a general medical condition     Decreased oral intake     Odynophagia     Iron deficiency     Anemia, iron deficiency     Adrenal insufficiency (H)     S/P hernia repair     Nausea     Chondromalacia of left patella     Gastrointestinal hemorrhage, unspecified gastrointestinal hemorrhage type     Age-related osteoporosis without current pathological fracture       HPI:  Chantell is a 55 year old female here for follow up of total pancreatectomy and islet autotransplant performed on January 6, 2012.  At the time of the procedure, the patient received 420,000 IEQ, or 5,438 IEQ/kg body weight.  She did not have diabetes before the procedure.  Early post-operative course was complicated by RLQ with appendicitis, eventually required appendectomy.    Despite the high islet mass transplanted, Chantell's post-operative course was initially remarkable for high insulin needs.  She in fact does have islet  function, as previously documented by mixed meal tests, but she was insulin resistant, requiring high doses of insulin when on MDI therapy.  She also had frequent day to day variability, and fluctuating patterns with illness and healthier times, as well as a complex regimen, all of which make her an excellent candidate for an insulin pump which she started in Feb 2014.  Extremely concerning was an episode of severe hypoglycemia in November 2013 at which time she was found unconscious.  Suspect at that time she was on too much basal insulin and because she was ill and not eating at the time, dropped far too low overnight.   In early 2014, she was started on an insulin pump with CGM.  Remarkably, her insulin needs have dropped dramatically on an insulin pump.  She was then relatively stable until 2016.  She was again admitted to the hospital on March 15 and March 29, 2016 for hypoglycemia, that appears to be secondary to both exogenous insulin and possible central adrenal insufficiency.   At that time she had the following lab findings:  On 3/29/16, elevated insulin with low C-peptide during BG 42 mg/dL around 5pm, and then again same pattern with BG 50s at 10pm.  Chantell is pretty clear that she did not take insuiln that day on 3/29/16. She also had three cortisol levels-- including one during hypoglycemia, one at around 4am (possibly also during hypoglycemia) and one 8am draw that were all low-- all 0.6- 1.6 range.    Of note, she did have a kenalog injection one week earlier on 3/24/16, just a few days prior to that draw and was also receiving narcotics in hospital (but not at home).  She has since had another severe hypogylcemic episode in Jan 2017 -- did not lose consciousness but was disoriented and unable to get help for herself at the store.  And again was admitted for severe hypoglycemia at [facility] on 11/29/2017 (refer to 12/13/17 note) with labs consistent with exogenous insulin overdose although she  denied taking any excess insulin (12/1 C-peptide <0.1 and insulin .64.7, 11/30 C-peptide 0.5 and insulin 91). Additional hypoglycemia admissions: 8/26/18, 9/3/18, with high insulin and low C-peptide c/w hypoglycemia secondary to exogenous insulin (9/3/18 at 6:41pm about 20 hours after last reported insulin dose; insulin 45.6 mU/L, C-peptide 0.2 ng/mL).    Picture is also complicated by non-diabetes history which includes the following :  - 7/6/2016 EGD identified diffuse candidiasis through entire esophagous.  Pt has also had recurrent oral thrush in 2016  - Recurrent chronic abdominal pain  - Recurrent vomiting of unclear etiology but G-T placed 10/2016 and converted to GJ 11/2016 with symptomatic improvement  Unclear if she has any gastroparesis.  Suboptimal study in March 2016 showed 100% retention at 1 hour and then rapid emptying.    - Hernia repair performed by general surgery 9/15/16 (TPIAT team not involved).    - Chronic abdominal pain      New history today:  1)  Diabetes:  This is the first that I have seen Chantell in some time.  She continues to use her 670G pump and intermittently wears the sensor.  She has been contacted by [employer] to upgrade to the 770G.  She has a history of severe hypoglycemia but this has not been an issue recently-- if anything she is running high.  She did download her pump before our visit but the date and time were set wrong, so I am not entirely clear what I am looking at-- scrolling through her meter there are no numbers between 2/27 and the day she uploaded, so I am thinking her pump may have been set up as a Feb date before uploading.  I did then pull back to Feb instead of March and see that she is dong better some days about putting in her carb amounts and BG multiple times per day and bolusing as she should, though other days she is struggling with this.  Even when she does this, she is running around 200 mg/dL.  Her A1c is 9.0% today.    Settings 670G  Basal 0.6  unit/hour  Bolus:  Set at I:C ratio 40g,  mg/dL, target   Today daily insulin dose 16 units per day;  On average all basal 14.1 unit, 1.9 bolus. -    CGM wear - 40% of the time based on Feb dates, only calibrates sensor 1.1 per day  29% TIR 40% 180-250 and 31% >250  Mean is 219 mg/dL, SD 73 mg/dL      2)  Adrenal insufficiency:  Still unclear if this was transient or true central AI but we have been treating her regardless due to SHE history.  We had been maitained on cortef 20- 25 mg/day.  We did not address this today, but I have not prescribed this in quite some time so will reach out to her to see if she is still taking this.    3)  Other reviewed today:   - stable abdominal pain issues- no opioids  - has a couple days per month of blood in the stool  -- plan for EGD and colonoscopy.  Was scheduled today but she rescheduled since she also had this appointment.      Review of systems:  Review Of Systems  Skin: negative  Eyes: negative  Ears/Nose/Throat: negative  Respiratory: No shortness of breath, dyspnea on exertion, cough, or hemoptysis  Cardiovascular: negative  Gastrointestinal: chronic abdominal pain as aboeve  Genitourinary: negative  Musculoskeletal: muscle cramps  Neurologic: negative  Psychiatric: negative  Hematologic/Lymphatic/Immunologic: negative  Endocrine: as above    Past Medical and Surgical History:  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    TPIAT     Spasm of sphincter of Oddi      Past Surgical History:   Procedure Laterality Date     ARTHROPLASTY CARPOMETACARPAL (THUMB JOINT)  5/2/2014     Procedure: ARTHROPLASTY CARPOMETACARPAL (THUMB JOINT);  Surgeon: Carina Panda MD;  Location: [facility]     CHOLECYSTECTOMY  2004     COLONOSCOPY  7/18/2014    Procedure: COLONOSCOPY;  Surgeon: Aurora Sahu MD;  [facility]     COLONOSCOPY N/A 8/1/2017    Procedure: COLONOSCOPY;  Colonoscopy and upper endoscopy;  Surgeon: Deirdre Harris MD;  [facility]     ENDOSCOPIC RETROGRADE CHOLANGIOPANCREATOGRAM       ENDOSCOPIC RETROGRADE CHOLANGIOPANCREATOGRAM  4/19/2011    Procedure:ENDOSCOPIC RETROGRADE CHOLANGIOPANCREATOGRAM; Pancreatic Stent Placement       ENDOSCOPIC RETROGRADE CHOLANGIOPANCREATOGRAM  5/26/2011    Procedure:ENDOSCOPIC RETROGRADE CHOLANGIOPANCREATOGRAM; with Pancreatic Stent Removal; Surgeon:DALE MIMS; [facility]     ENDOSCOPY UPPER, COLONOSCOPY, COMBINED  4/25/2012    Procedure:COMBINED ENDOSCOPY UPPER, COLONOSCOPY; Enteroscopy with Bile Duct Stent Removal, Colonoscopy  *[facility] Safe Room*; Surgeon:GRACY GODWINIQ; [facility]     ESOPHAGOSCOPY, GASTROSCOPY, DUODENOSCOPY (EGD), COMBINED  5/26/2011    Procedure:COMBINED ESOPHAGOSCOPY, GASTROSCOPY, DUODENOSCOPY (EGD); Surgeon:DALE MIMS; [facility]     ESOPHAGOSCOPY, GASTROSCOPY, DUODENOSCOPY (EGD), COMBINED N/A 10/30/2014    Procedure: COMBINED ESOPHAGOSCOPY, GASTROSCOPY, DUODENOSCOPY (EGD), BIOPSY SINGLE OR MULTIPLE;  Surgeon: Sarai Moon MD;  [facility]     ESOPHAGOSCOPY, GASTROSCOPY, DUODENOSCOPY (EGD), COMBINED Left 7/6/2015    Procedure: COMBINED ESOPHAGOSCOPY, GASTROSCOPY, DUODENOSCOPY (EGD), BIOPSY SINGLE OR MULTIPLE;  Surgeon: Thomas Estrada MD;  [facility]     ESOPHAGOSCOPY, GASTROSCOPY, DUODENOSCOPY (EGD), COMBINED N/A 7/8/2016    Procedure: COMBINED ESOPHAGOSCOPY, GASTROSCOPY, DUODENOSCOPY (EGD), BIOPSY SINGLE OR MULTIPLE;  Surgeon: Eloy Klein MD;  [facility]     ESOPHAGOSCOPY, GASTROSCOPY, DUODENOSCOPY (EGD),  COMBINED N/A 8/4/2016    Procedure: COMBINED ESOPHAGOSCOPY, GASTROSCOPY, DUODENOSCOPY (EGD), BIOPSY SINGLE OR MULTIPLE;  Surgeon: Jason Brown MD;  [facility]     ESOPHAGOSCOPY, GASTROSCOPY, DUODENOSCOPY (EGD), COMBINED N/A 8/1/2017    Procedure: COMBINED ESOPHAGOSCOPY, GASTROSCOPY, DUODENOSCOPY (EGD);;  Surgeon: Deirdre Harris MD;  [facility]     ESOPHAGOSCOPY, GASTROSCOPY, DUODENOSCOPY (EGD), COMBINED N/A 6/12/2019    Procedure: ESOPHAGOGASTRODUODENOSCOPY (EGD);  Surgeon: Jose Francisco Perdomo MD;  [facility]     GYN SURGERY      Hysterectomy and USO     [facility] ENDOSCOPY W EUS  7/20/2011    Procedure:COMBINED ENDOSCOPIC ULTRASOUND, ESOPHAGOSCOPY, GASTROSCOPY, DUODENOSCOPY (EGD); Surgeon:DARVIN DONOHUE; [facility]     HERNIORRHAPHY VENTRAL N/A 9/15/2016    Procedure: HERNIORRHAPHY VENTRAL;  Surgeon: Juanita Bernabe MD;  [facility]     HYSTERECTOMY  1997 or 1998    USO     INCISION AND DRAINAGE ABDOMEN WASHOUT, COMBINED  8/16/2012    Procedure: COMBINED INCISION AND DRAINAGE ABDOMEN WASHOUT;  ,debridement and Drainage Post Appendectomy;  Surgeon: Ron Austin MD;  [facility]     INJECT TRANSVERSUS ABDOMINIS PLANE (TAP) BLOCK BILATERAL Bilateral 5/26/2016    Procedure: INJECT TRANSVERSUS ABDOMINIS PLANE (TAP) BLOCK BILATERAL;  Surgeon: Leonard Mccallum MD;  [facility]     LAPAROSCOPIC APPENDECTOMY  7/30/2012    Procedure: LAPAROSCOPIC APPENDECTOMY;  Open Appendectomy;  Surgeon: Ron Austin MD;  [facility]     PANCREATECTOMY, TRANSPLANT AUTO ISLET CELL, COMBINED  1/6/2012    Procedure:COMBINED PANCREATECTOMY, TRANSPLANT AUTO ISLET CELL; Total  Pancreatectomy, Auto Islet Transplant, splenectomy, 18fr. transgastric-jejunal feeding tube placement, liver biopsy; Surgeon:PALAK LEE; [facility]     REPLACE GASTROSTOMY TUBE, PERCUTANEOUS N/A 8/30/2017    Procedure: REPLACE GASTROSTOMY TUBE, PERCUTANEOUS;  GJ Tube Change;   Surgeon: Jose Nath PA-C;  [facility]     SPLENECTOMY         Family History:  New changes since last visit:  none  Family History   Problem Relation Age of Onset     Hypertension Mother      Diabetes Mother      Osteoporosis Mother      Cancer Father         pancreatic cancer     Diabetes Maternal Grandmother      Cardiovascular Maternal Grandmother      Cancer Maternal Grandfather         lung cancer     Cancer Sister         brain     Cancer Sister         liver cancer       Social History:  Social History     Social History Narrative     Not on file      Lives in [place] with her . Previously noted family stress (2018).      Physical Exam:  Vitals: /74   Pulse 71   Wt 66.9 kg (147 lb 8 oz)   SpO2 98%   BMI 26.98 kg/m    BMI= Body mass index is 26.98 kg/m .     General:  Well-appearing, NAD  Head: NC/AT  Eyes: sclera white  Neuro:  Normal mental status, normal gross motor  Psych:  Communicative, with normal affect     Results:      Mixed Meal Test:  C Peptide   Date Value Ref Range Status   04/18/2019 1.8 0.9 - 6.9 ng/mL Final   09/07/2018 2.8 0.9 - 6.9 ng/mL Final   09/06/2018 1.8 0.9 - 6.9 ng/mL Final   09/03/2018 0.2 (L) 0.9 - 6.9 ng/mL Final   08/28/2018 0.3 (L) 0.9 - 6.9 ng/mL Final     Glucose   Date Value Ref Range Status   11/12/2020 129 (H) 70 - 99 mg/dL Final   11/11/2020 99 70 - 99 mg/dL Final   11/10/2020 145 (H) 70 - 99 mg/dL Final   11/09/2020 100 (H) 70 - 99 mg/dL Final   11/09/2020 128 (H) 70 - 99 mg/dL Final       Hemoglobin A1c  Lab Results   Component Value Date    A1C 7.3 11/09/2020    A1C 6.7 11/21/2019    A1C 8.2 06/11/2019    A1C Canceled, Test credited 06/10/2019    A1C 9.6 04/18/2019       Liver enzymes  Lab Results   Component Value Date    AST 34 11/09/2020     Lab Results   Component Value Date    ALT 50 11/09/2020     Lab Results   Component Value Date    BILICONJ 0.0 05/20/2014      Lab Results   Component Value Date    BILITOTAL 0.4  11/09/2020     Lab Results   Component Value Date    ALBUMIN 3.2 11/09/2020     Lab Results   Component Value Date    PROTTOTAL 6.3 11/09/2020      Lab Results   Component Value Date    ALKPHOS 88 11/09/2020       Creatinine:  Creatinine   Date Value Ref Range Status   11/12/2020 0.83 0.52 - 1.04 mg/dL Final   ]    Complete Blood Count:  CBC RESULTS:   Recent Labs   Lab Test 11/13/20  1219 11/12/20  0600   WBC  --  7.9   RBC  --  3.16*   HGB 10.6* 9.5*   HCT  --  29.1*   MCV  --  92   MCH  --  30.1   MCHC  --  32.6   RDW  --  13.6   PLT  --  333       Cholesterol  Lab Results   Component Value Date    CHOL 168 07/12/2019     Lab Results   Component Value Date    HDL 83 07/12/2019     Lab Results   Component Value Date    LDL 71 07/12/2019     Lab Results   Component Value Date    TRIG 73 07/12/2019     Lab Results   Component Value Date    CHOLHDLRATIO 3.2 01/08/2015       C-Evjedec04/1/2017  [facility]   Component Name Value Ref Range   C-PEPTIDE  <0.1 (L)   Comment:    Test Performed by:  [facility] [place] [address]  [address] , [place] [address] 1.1 - 4.4 ng/mL    Specimen   Blood     Simultaneous glucose 12/1/17 = 81 mg/dL      Assessment:  1. 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  Treated for central adrenal insufficiency.        Chantell is a 55 year old with history of chronic pancreatitis who is s/p total pancreatectomy and islet autotransplant, with insulin dependence (pump therapy) and partial islet graft function, complicated by insulin resistance, and several episodes of severe hypoglycemia and seizures (E10.641).  She is treated with a continuous subcutaneous insulin infusion pump.  We had to stop this temporarily due to a pump failure.  Now w 670g, waiting to be trained to start.    Chantell has post-pancreatectomy diabetes-- essentially a surgical form of type 1 diabetes (E10.641).  She requires an insulin  pump for management due to her multiple episodes of hypoglycemia and hypoglycemia unawareness, including episodes of seizures that required suspension of insulin pump and hospital admits. She also should have a continuous glucose monitor for frequent monitoring because of her severe hypoglycemia history.  Chantell continues to wear a CGM, and needs to be on CGM or test 4 times per day, and we documented during the clinic encounter today use of CGM from review of her logs.  Chantell requires frequent insulin adjustments to her insulin regimen based on her blood glucoses to control hyperglycemia and hypoglycemia.       Chantell has been contacted by [employer] re upgrading to the 770G.  She has transitioned insurance in the last few years.  If she is due for a pump upgrade at this time, I told her I was reluctant for her to move forward with the 770G without reviewing all her pump options and suggested diabetes education.  She was very receptive to this.  Her granddaughter has a OmniPod pump and is very happy with that.  My concern with Chantell is that the Medtronic system has always been too complex for her to go into auto mode-- she has days with variable adherence and she struggles to calibrate the sensor frequently enough. I think she would do better with a Dexcom sensor, and ideally even a hybrid CLP system that integrates with Dexcom.  She also does need to continue to be more routine about bolusing every day (though she has made some progress since the last time I reviewed her pump download).  It is clear when she uses her insulin she still needs more, so we changed carb ratio as below.         Plan:  1.  Changes to current diabetes regimen:  Patient Instructions   1)  Changed the insulin to carb ratio to 30 grams.    2)  I would like you to see diabetes ed (at [facility], or if that is not an option, we will do here at [facility]) for new pump options since you are due up for a pump.  I really think the Dexcom CGM with a Tslim pump or an  Omnipod may be better options for you than the Medtronic.  But we can go with whatever one you chose.    3)  July 15 @ 3:40pm follow up      2.  Frequency of blood sugar checks:  Premeal, bedtime, and additional measurements PRN-- Should have strips sufficient to test 8 times per day given her labiltiy history.    3.  Continue routine follow up for autoislet transplant patients:  Mixed meal test (6 mL/kg BoostHP to max of 360 mL) at 3 months, 6 months, and once a year post transplant.  Hemoglobin A1c levels at these time points and quarterly.    4.  Other issues addressed today:  As above        Follow up: as above    Contact me for questions at [phone] or [phone].  Emergency number to reach pediatric endocrinology after hours is [phone].      Dr. Amena Maher MD  [facility]  Director of Research, Islet Auto-transplant Program  Phone:  [phone]  Electronically signed: April 2, 2021 at 12:14 PM      Review of the result(s) of each unique test - Hba1c and pump data.  40 minutes spent on the date of the encounter doing chart review, history and exam, documentation and further activities per the note        Again, thank you for allowing me to participate in the care of your patient.        Sincerely,        Amena Maher MD

## 2021-04-01 NOTE — PATIENT INSTRUCTIONS
1)  Changed the insulin to carb ratio to 30 grams.    2)  I would like you to see diabetes ed (at [facility], or if that is not an option, we will do here at [facility]) for new pump options since you are due up for a pump.  I really think the Dexcom CGM with a Tslim pump or an Omnipod may be better options for you than the Medtronic.  But we can go with whatever one you chose.    3)  July 15 @ 3:40pm follow up

## 2021-04-02 NOTE — PROGRESS NOTES
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     Post-pancreatectomy diabetes (H)     Type 1 diabetes mellitus with hypoglycemia and without coma (H)     Migraine     Abdominal muscle strain     CIERRA (generalized anxiety disorder)     Major depressive disorder, recurrent episode, moderate (H)     CMC DJD(carpometacarpal degenerative joint disease), localized primary     Exocrine pancreatic insufficiency     Hypothyroidism     Hypoglycemia     Mood disorder due to a general medical condition     Decreased oral intake     Odynophagia     Iron deficiency     Anemia, iron deficiency     Adrenal insufficiency (H)     S/P hernia repair     Nausea     Chondromalacia of left patella     Gastrointestinal hemorrhage, unspecified gastrointestinal hemorrhage type     Age-related osteoporosis without current pathological fracture       HPI:  Chantell is a 55 year old female here for follow up of total pancreatectomy and islet autotransplant performed on January 6, 2012.  At the time of the procedure, the patient received 420,000 IEQ, or 5,438 IEQ/kg body weight.  She did not have diabetes before the procedure.  Early post-operative course was complicated by RLQ with appendicitis, eventually required appendectomy.    Despite the high islet mass transplanted, Chantell's post-operative course was initially remarkable for high insulin needs.  She in fact does have islet function, as previously documented by mixed meal tests, but she was insulin resistant, requiring high doses of insulin when on MDI therapy.  She also had frequent day to day variability, and fluctuating patterns with illness and healthier times, as  well as a complex regimen, all of which make her an excellent candidate for an insulin pump which she started in Feb 2014.  Extremely concerning was an episode of severe hypoglycemia in November 2013 at which time she was found unconscious.  Suspect at that time she was on too much basal insulin and because she was ill and not eating at the time, dropped far too low overnight.   In early 2014, she was started on an insulin pump with CGM.  Remarkably, her insulin needs have dropped dramatically on an insulin pump.  She was then relatively stable until 2016.  She was again admitted to the hospital on March 15 and March 29, 2016 for hypoglycemia, that appears to be secondary to both exogenous insulin and possible central adrenal insufficiency.   At that time she had the following lab findings:  On 3/29/16, elevated insulin with low C-peptide during BG 42 mg/dL around 5pm, and then again same pattern with BG 50s at 10pm.  Chantell is pretty clear that she did not take insuiln that day on 3/29/16. She also had three cortisol levels-- including one during hypoglycemia, one at around 4am (possibly also during hypoglycemia) and one 8am draw that were all low-- all 0.6- 1.6 range.    Of note, she did have a kenalog injection one week earlier on 3/24/16, just a few days prior to that draw and was also receiving narcotics in hospital (but not at home).  She has since had another severe hypogylcemic episode in Jan 2017 -- did not lose consciousness but was disoriented and unable to get help for herself at the store.  And again was admitted for severe hypoglycemia at [facility] on 11/29/2017 (refer to 12/13/17 note) with labs consistent with exogenous insulin overdose although she denied taking any excess insulin (12/1 C-peptide <0.1 and insulin .64.7, 11/30 C-peptide 0.5 and insulin 91). Additional hypoglycemia admissions: 8/26/18, 9/3/18, with high insulin and low C-peptide c/w hypoglycemia secondary to exogenous insulin  (9/3/18 at 6:41pm about 20 hours after last reported insulin dose; insulin 45.6 mU/L, C-peptide 0.2 ng/mL).    Picture is also complicated by non-diabetes history which includes the following :  - 7/6/2016 EGD identified diffuse candidiasis through entire esophagous.  Pt has also had recurrent oral thrush in 2016  - Recurrent chronic abdominal pain  - Recurrent vomiting of unclear etiology but G-T placed 10/2016 and converted to GJ 11/2016 with symptomatic improvement  Unclear if she has any gastroparesis.  Suboptimal study in March 2016 showed 100% retention at 1 hour and then rapid emptying.    - Hernia repair performed by general surgery 9/15/16 (TPIAT team not involved).    - Chronic abdominal pain      New history today:  1)  Diabetes:  This is the first that I have seen Chantell in some time.  She continues to use her 670G pump and intermittently wears the sensor.  She has been contacted by [employer] to upgrade to the 770G.  She has a history of severe hypoglycemia but this has not been an issue recently-- if anything she is running high.  She did download her pump before our visit but the date and time were set wrong, so I am not entirely clear what I am looking at-- scrolling through her meter there are no numbers between 2/27 and the day she uploaded, so I am thinking her pump may have been set up as a Feb date before uploading.  I did then pull back to Feb instead of March and see that she is dong better some days about putting in her carb amounts and BG multiple times per day and bolusing as she should, though other days she is struggling with this.  Even when she does this, she is running around 200 mg/dL.  Her A1c is 9.0% today.    Settings 670G  Basal 0.6 unit/hour  Bolus:  Set at I:C ratio 40g,  mg/dL, target   Today daily insulin dose 16 units per day;  On average all basal 14.1 unit, 1.9 bolus. -    CGM wear - 40% of the time based on Feb dates, only calibrates sensor 1.1 per day  29% TIR  40% 180-250 and 31% >250  Mean is 219 mg/dL, SD 73 mg/dL      2)  Adrenal insufficiency:  Still unclear if this was transient or true central AI but we have been treating her regardless due to SHE history.  We had been maitained on cortef 20- 25 mg/day.  We did not address this today, but I have not prescribed this in quite some time so will reach out to her to see if she is still taking this.    3)  Other reviewed today:   - stable abdominal pain issues- no opioids  - has a couple days per month of blood in the stool  -- plan for EGD and colonoscopy.  Was scheduled today but she rescheduled since she also had this appointment.      Review of systems:  Review Of Systems  Skin: negative  Eyes: negative  Ears/Nose/Throat: negative  Respiratory: No shortness of breath, dyspnea on exertion, cough, or hemoptysis  Cardiovascular: negative  Gastrointestinal: chronic abdominal pain as aboeve  Genitourinary: negative  Musculoskeletal: muscle cramps  Neurologic: negative  Psychiatric: negative  Hematologic/Lymphatic/Immunologic: negative  Endocrine: as above    Past Medical and Surgical History:  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    TPIAT     Spasm of sphincter of Oddi      Past Surgical History:   Procedure Laterality Date     ARTHROPLASTY CARPOMETACARPAL (THUMB JOINT)  5/2/2014    Procedure: ARTHROPLASTY CARPOMETACARPAL (THUMB JOINT);  Surgeon: Carina Panda MD;  Location: [facility]     CHOLECYSTECTOMY  2004     COLONOSCOPY  7/18/2014    Procedure: COLONOSCOPY;  Surgeon: Aurora Sahu MD;  [facility]      COLONOSCOPY N/A 8/1/2017    Procedure: COLONOSCOPY;  Colonoscopy and upper endoscopy;  Surgeon: Deirdre Harris MD;  [facility]     ENDOSCOPIC RETROGRADE CHOLANGIOPANCREATOGRAM       ENDOSCOPIC RETROGRADE CHOLANGIOPANCREATOGRAM  4/19/2011    Procedure:ENDOSCOPIC RETROGRADE CHOLANGIOPANCREATOGRAM; Pancreatic Stent Placement       ENDOSCOPIC RETROGRADE CHOLANGIOPANCREATOGRAM  5/26/2011    Procedure:ENDOSCOPIC RETROGRADE CHOLANGIOPANCREATOGRAM; with Pancreatic Stent Removal; Surgeon:DALE MIMS; [facility]     ENDOSCOPY UPPER, COLONOSCOPY, COMBINED  4/25/2012    Procedure:COMBINED ENDOSCOPY UPPER, COLONOSCOPY; Enteroscopy with Bile Duct Stent Removal, Colonoscopy  *Latex Safe Room*; Surgeon:GRACY GODWINIQ; [facility]     ESOPHAGOSCOPY, GASTROSCOPY, DUODENOSCOPY (EGD), COMBINED  5/26/2011    Procedure:COMBINED ESOPHAGOSCOPY, GASTROSCOPY, DUODENOSCOPY (EGD); Surgeon:DALE MIMS; [facility]     ESOPHAGOSCOPY, GASTROSCOPY, DUODENOSCOPY (EGD), COMBINED N/A 10/30/2014    Procedure: COMBINED ESOPHAGOSCOPY, GASTROSCOPY, DUODENOSCOPY (EGD), BIOPSY SINGLE OR MULTIPLE;  Surgeon: Sarai Moon MD;  [facility]     ESOPHAGOSCOPY, GASTROSCOPY, DUODENOSCOPY (EGD), COMBINED Left 7/6/2015    Procedure: COMBINED ESOPHAGOSCOPY, GASTROSCOPY, DUODENOSCOPY (EGD), BIOPSY SINGLE OR MULTIPLE;  Surgeon: Thomas Estrada MD;  [facility]     ESOPHAGOSCOPY, GASTROSCOPY, DUODENOSCOPY (EGD), COMBINED N/A 7/8/2016    Procedure: COMBINED ESOPHAGOSCOPY, GASTROSCOPY, DUODENOSCOPY (EGD), BIOPSY SINGLE OR MULTIPLE;  Surgeon: Eloy Klein MD;  [facility]     ESOPHAGOSCOPY, GASTROSCOPY, DUODENOSCOPY (EGD), COMBINED N/A 8/4/2016    Procedure: COMBINED ESOPHAGOSCOPY, GASTROSCOPY, DUODENOSCOPY (EGD), BIOPSY SINGLE OR MULTIPLE;  Surgeon: Jason Brown MD;  [facility]     ESOPHAGOSCOPY, GASTROSCOPY, DUODENOSCOPY (EGD), COMBINED N/A 8/1/2017     Procedure: COMBINED ESOPHAGOSCOPY, GASTROSCOPY, DUODENOSCOPY (EGD);;  Surgeon: Deirdre Harris MD;  [facility]     ESOPHAGOSCOPY, GASTROSCOPY, DUODENOSCOPY (EGD), COMBINED N/A 6/12/2019    Procedure: ESOPHAGOGASTRODUODENOSCOPY (EGD);  Surgeon: Jose Francisco Perdomo MD;  [facility]     GYN SURGERY      Hysterectomy and USO     [facility] ENDOSCOPY W EUS  7/20/2011    Procedure:COMBINED ENDOSCOPIC ULTRASOUND, ESOPHAGOSCOPY, GASTROSCOPY, DUODENOSCOPY (EGD); Surgeon:DARVIN DONOHUE; [facility]     HERNIORRHAPHY VENTRAL N/A 9/15/2016    Procedure: HERNIORRHAPHY VENTRAL;  Surgeon: Juanita Bernabe MD;  [facility]     HYSTERECTOMY  1997 or 1998    USO     INCISION AND DRAINAGE ABDOMEN WASHOUT, COMBINED  8/16/2012    Procedure: COMBINED INCISION AND DRAINAGE ABDOMEN WASHOUT;  ,debridement and Drainage Post Appendectomy;  Surgeon: Ron Austin MD;  [facility]     INJECT TRANSVERSUS ABDOMINIS PLANE (TAP) BLOCK BILATERAL Bilateral 5/26/2016    Procedure: INJECT TRANSVERSUS ABDOMINIS PLANE (TAP) BLOCK BILATERAL;  Surgeon: Leonard Mccallum MD;  [facility]     LAPAROSCOPIC APPENDECTOMY  7/30/2012    Procedure: LAPAROSCOPIC APPENDECTOMY;  Open Appendectomy;  Surgeon: Ron Austin MD;  [facility]     PANCREATECTOMY, TRANSPLANT AUTO ISLET CELL, COMBINED  1/6/2012    Procedure:COMBINED PANCREATECTOMY, TRANSPLANT AUTO ISLET CELL; Total  Pancreatectomy, Auto Islet Transplant, splenectomy, 18fr. transgastric-jejunal feeding tube placement, liver biopsy; Surgeon:PALAK LEE; [facility]     REPLACE GASTROSTOMY TUBE, PERCUTANEOUS N/A 8/30/2017    Procedure: REPLACE GASTROSTOMY TUBE, PERCUTANEOUS;  GJ Tube Change;  Surgeon: Jose Nath PA-C;  [facility]     SPLENECTOMY         Family History:  New changes since last visit:  none  Family History   Problem Relation Age of Onset     Hypertension Mother      Diabetes Mother      Osteoporosis  Mother      Cancer Father         pancreatic cancer     Diabetes Maternal Grandmother      Cardiovascular Maternal Grandmother      Cancer Maternal Grandfather         lung cancer     Cancer Sister         brain     Cancer Sister         liver cancer       Social History:  Social History     Social History Narrative     Not on file      Lives in [place] with her . Previously noted family stress (2018).      Physical Exam:  Vitals: /74   Pulse 71   Wt 66.9 kg (147 lb 8 oz)   SpO2 98%   BMI 26.98 kg/m    BMI= Body mass index is 26.98 kg/m .     General:  Well-appearing, NAD  Head: NC/AT  Eyes: sclera white  Neuro:  Normal mental status, normal gross motor  Psych:  Communicative, with normal affect     Results:      Mixed Meal Test:  C Peptide   Date Value Ref Range Status   04/18/2019 1.8 0.9 - 6.9 ng/mL Final   09/07/2018 2.8 0.9 - 6.9 ng/mL Final   09/06/2018 1.8 0.9 - 6.9 ng/mL Final   09/03/2018 0.2 (L) 0.9 - 6.9 ng/mL Final   08/28/2018 0.3 (L) 0.9 - 6.9 ng/mL Final     Glucose   Date Value Ref Range Status   11/12/2020 129 (H) 70 - 99 mg/dL Final   11/11/2020 99 70 - 99 mg/dL Final   11/10/2020 145 (H) 70 - 99 mg/dL Final   11/09/2020 100 (H) 70 - 99 mg/dL Final   11/09/2020 128 (H) 70 - 99 mg/dL Final       Hemoglobin A1c  Lab Results   Component Value Date    A1C 7.3 11/09/2020    A1C 6.7 11/21/2019    A1C 8.2 06/11/2019    A1C Canceled, Test credited 06/10/2019    A1C 9.6 04/18/2019       Liver enzymes  Lab Results   Component Value Date    AST 34 11/09/2020     Lab Results   Component Value Date    ALT 50 11/09/2020     Lab Results   Component Value Date    BILICONJ 0.0 05/20/2014      Lab Results   Component Value Date    BILITOTAL 0.4 11/09/2020     Lab Results   Component Value Date    ALBUMIN 3.2 11/09/2020     Lab Results   Component Value Date    PROTTOTAL 6.3 11/09/2020      Lab Results   Component Value Date    ALKPHOS 88 11/09/2020       Creatinine:  Creatinine   Date Value Ref  Range Status   11/12/2020 0.83 0.52 - 1.04 mg/dL Final   ]    Complete Blood Count:  CBC RESULTS:   Recent Labs   Lab Test 11/13/20  1219 11/12/20  0600   WBC  --  7.9   RBC  --  3.16*   HGB 10.6* 9.5*   HCT  --  29.1*   MCV  --  92   MCH  --  30.1   MCHC  --  32.6   RDW  --  13.6   PLT  --  333       Cholesterol  Lab Results   Component Value Date    CHOL 168 07/12/2019     Lab Results   Component Value Date    HDL 83 07/12/2019     Lab Results   Component Value Date    LDL 71 07/12/2019     Lab Results   Component Value Date    TRIG 73 07/12/2019     Lab Results   Component Value Date    CHOLHDLRATIO 3.2 01/08/2015       C-Xjokrdv55/1/2017  [facility]   Component Name Value Ref Range   C-PEPTIDE  <0.1 (L)   Comment:    Test Performed by:  [facility]  [address] [place] [address] 1.1 - 4.4 ng/mL    Specimen   Blood     Simultaneous glucose 12/1/17 = 81 mg/dL      Assessment:  1. 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  Treated for central adrenal insufficiency.        Chantell is a 55 year old with history of chronic pancreatitis who is s/p total pancreatectomy and islet autotransplant, with insulin dependence (pump therapy) and partial islet graft function, complicated by insulin resistance, and several episodes of severe hypoglycemia and seizures (E10.641).  She is treated with a continuous subcutaneous insulin infusion pump.  We had to stop this temporarily due to a pump failure.  Now w 670g, waiting to be trained to start.    Chantell has post-pancreatectomy diabetes-- essentially a surgical form of type 1 diabetes (E10.641).  She requires an insulin pump for management due to her multiple episodes of hypoglycemia and hypoglycemia unawareness, including episodes of seizures that required suspension of insulin pump and hospital admits. She also should have a continuous glucose monitor for frequent  monitoring because of her severe hypoglycemia history.  Chantell continues to wear a CGM, and needs to be on CGM or test 4 times per day, and we documented during the clinic encounter today use of CGM from review of her logs.  Chantell requires frequent insulin adjustments to her insulin regimen based on her blood glucoses to control hyperglycemia and hypoglycemia.       Chantell has been contacted by [employer] re upgrading to the 770G.  She has transitioned insurance in the last few years.  If she is due for a pump upgrade at this time, I told her I was reluctant for her to move forward with the 770G without reviewing all her pump options and suggested diabetes education.  She was very receptive to this.  Her granddaughter has a OmniPod pump and is very happy with that.  My concern with Chantell is that the Medtronic system has always been too complex for her to go into auto mode-- she has days with variable adherence and she struggles to calibrate the sensor frequently enough. I think she would do better with a Dexcom sensor, and ideally even a hybrid CLP system that integrates with Dexcom.  She also does need to continue to be more routine about bolusing every day (though she has made some progress since the last time I reviewed her pump download).  It is clear when she uses her insulin she still needs more, so we changed carb ratio as below.         Plan:  1.  Changes to current diabetes regimen:  Patient Instructions   1)  Changed the insulin to carb ratio to 30 grams.    2)  I would like you to see diabetes ed (at [facility], or if that is not an option, we will do here at [facility]) for new pump options since you are due up for a pump.  I really think the Dexcom CGM with a Tslim pump or an Omnipod may be better options for you than the Medtronic.  But we can go with whatever one you chose.    3)  July 15 @ 3:40pm follow up      2.  Frequency of blood sugar checks:  Premeal, bedtime, and additional measurements PRN-- Should have strips  sufficient to test 8 times per day given her labiltiy history.    3.  Continue routine follow up for autoislet transplant patients:  Mixed meal test (6 mL/kg BoostHP to max of 360 mL) at 3 months, 6 months, and once a year post transplant.  Hemoglobin A1c levels at these time points and quarterly.    4.  Other issues addressed today:  As above        Follow up: as above    Contact me for questions at [phone] or [phone].  Emergency number to reach pediatric endocrinology after hours is [phone].      Dr. Amena Maher MD  [facility]  Director of Research, Islet Auto-transplant Program  Phone:  [phone]  Electronically signed: April 2, 2021 at 12:14 PM      Review of the result(s) of each unique test - Hba1c and pump data.  40 minutes spent on the date of the encounter doing chart review, history and exam, documentation and further activities per the note

## 2021-04-11 ENCOUNTER — HEALTH MAINTENANCE LETTER (OUTPATIENT)
Age: 56
End: 2021-04-11

## 2021-06-11 DIAGNOSIS — E13.9 POST-PANCREATECTOMY DIABETES (H): ICD-10-CM

## 2021-06-11 DIAGNOSIS — Z90.410 POST-PANCREATECTOMY DIABETES (H): ICD-10-CM

## 2021-06-11 DIAGNOSIS — E89.1 POST-PANCREATECTOMY DIABETES (H): ICD-10-CM

## 2021-06-11 NOTE — TELEPHONE ENCOUNTER
Left message with Primary Care clinic number requesting patient to call back to schedule follow up with Dr Morgan, Last Office Visit : 11-.

## 2021-06-11 NOTE — TELEPHONE ENCOUNTER
insulin aspart (NOVOLOG VIAL) 100 UNITS/ML vial      Last Written Prescription Date:  3-  Last Fill Quantity: 10ml,   # refills: 0  Last Office Visit : 11-  RTC 4 weeks  Future Office visit:  None  Scheduling has been notified to contact the pt for appointment.      Routing refill request to provider for review/approval because:  Refill sent to pharmacy with instructions to schedule appointment. Sending FYI to clinic as patient is overdue for appointment.

## 2021-07-20 ENCOUNTER — TELEPHONE (OUTPATIENT)
Dept: TRANSPLANT | Facility: CLINIC | Age: 56
End: 2021-07-20

## 2021-07-28 ENCOUNTER — TELEPHONE (OUTPATIENT)
Dept: TRANSPLANT | Facility: CLINIC | Age: 56
End: 2021-07-28

## 2021-07-28 DIAGNOSIS — E13.9 POST-PANCREATECTOMY DIABETES (H): ICD-10-CM

## 2021-07-28 DIAGNOSIS — Z90.410 POST-PANCREATECTOMY DIABETES (H): ICD-10-CM

## 2021-07-28 DIAGNOSIS — E89.1 POST-PANCREATECTOMY DIABETES (H): ICD-10-CM

## 2021-07-28 NOTE — TELEPHONE ENCOUNTER
Spoke to Chantell this afternoon about her missed appointment with Dr Maher. She stated that she did not get a reminder. We reviewed the last My chart  which was back in April. While I was on the phone with her I assisted her to log in and get her password reset. She was then able to see my messages , her missed appointments and the reminders she has been sent but not reviewed. She has reached out to her PCP office for Insulin refills for her pump.She will try and get back on track by scheduling an appointment with the [place] diabetic educators and uploading her Medtronic pump so we can see her numbers etc. I said that once she had done this I would schedule her to see Dr Maher and put her on an unofficial cancellation list.

## 2021-07-28 NOTE — TELEPHONE ENCOUNTER
Health Call Center    Phone Message    May a detailed message be left on voicemail: yes     Reason for Call: Medication Refill Request    Has the patient contacted the pharmacy for the refill? Yes   Name of medication being requested: insulin aspart (NOVOLOG VIAL) 100 UNITS/ML vial     Provider who prescribed the medication: Dr. Morgan  Pharmacy: [facility]  Date medication is needed: 7/28/21   Pt needing this script today, pt is out. Pt has a f/u scheduled with Dr. Morgan for 8/25      Action Taken: Message routed to:  [facility] med refills

## 2021-07-28 NOTE — CONFIDENTIAL NOTE
insulin aspart (NOVOLOG VIAL) 100 UNITS/ML vial  Last Written Prescription Date:  6/11/21  Last Fill Quantity: 10ml,   # refills: 0  Last Office Visit : 11/17/20  Future Office visit:  8/25/21      Routed because:  A1C

## 2021-09-25 ENCOUNTER — HEALTH MAINTENANCE LETTER (OUTPATIENT)
Age: 56
End: 2021-09-25

## 2021-11-06 DIAGNOSIS — E13.9 POST-PANCREATECTOMY DIABETES (H): ICD-10-CM

## 2021-11-06 DIAGNOSIS — Z90.410 POST-PANCREATECTOMY DIABETES (H): ICD-10-CM

## 2021-11-06 DIAGNOSIS — E89.1 POST-PANCREATECTOMY DIABETES (H): ICD-10-CM

## 2021-11-06 NOTE — TELEPHONE ENCOUNTER
insulin aspart (NOVOLOG VIAL) 100 UNITS/ML vial     Last Written Prescription Date:7/28/21  Last Fill Quantity: 10ml,   # refills: 1  Last Office Visit :11/17/20  Future Office visit:  None    Scheduling has been notified to contact the pt for appointment.    Routing refill request to provider for review/approval because:A1C ,appt past due.

## 2021-11-07 ENCOUNTER — TRANSFERRED RECORDS (OUTPATIENT)
Dept: HEALTH INFORMATION MANAGEMENT | Facility: CLINIC | Age: 56
End: 2021-11-07
Payer: COMMERCIAL

## 2021-11-20 ENCOUNTER — HEALTH MAINTENANCE LETTER (OUTPATIENT)
Age: 56
End: 2021-11-20

## 2022-01-27 ENCOUNTER — TELEPHONE (OUTPATIENT)
Dept: TRANSPLANT | Facility: CLINIC | Age: 57
End: 2022-01-27
Payer: COMMERCIAL

## 2022-01-27 NOTE — TELEPHONE ENCOUNTER
January 27, 2022 1:37 PM - [facility]: pt caled to ruben today appt dut to car problems ruben 2/3

## 2022-03-19 DIAGNOSIS — E89.1 POST-PANCREATECTOMY DIABETES (H): ICD-10-CM

## 2022-03-19 DIAGNOSIS — Z90.410 POST-PANCREATECTOMY DIABETES (H): ICD-10-CM

## 2022-03-19 DIAGNOSIS — E13.9 POST-PANCREATECTOMY DIABETES (H): ICD-10-CM

## 2022-03-25 ENCOUNTER — TELEPHONE (OUTPATIENT)
Dept: INTERNAL MEDICINE | Facility: CLINIC | Age: 57
End: 2022-03-25

## 2022-03-25 NOTE — TELEPHONE ENCOUNTER
Prior Authorization Retail Medication Request    Medication/Dose: insulin aspart (NOVOLOG VIAL) 100 UNITS/ML vial  ICD code (if different than what is on RX): Post-pancreatectomy diabetes (H) [E89.1, E13.9, Z90.410]   Previously Tried and Failed:    Rationale:     Insurance Name:  [employer]  Insurance ID: [id number]    Pharmacy Information (if different than what is on RX)  Name:  [id number] [place], [place] - [address]  Phone:  [phone]

## 2022-03-29 NOTE — TELEPHONE ENCOUNTER
Prior Authorization Approval    Authorization Effective Date: 2/27/2022  Authorization Expiration Date: 3/29/2023  Medication: insulin aspart (NOVOLOG VIAL) 100 UNITS/ML vial - APPROVED  Approved Dose/Quantity: 10 ML PER 20 DAYS   Insurance Company: [employer] - Phone [phone] Fax [phone]  Which Pharmacy is filling the prescription (Not needed for infusion/clinic administered): [id number] - [place], [place] - [address]  Pharmacy Notified: Yes  Patient Notified: Yes Pharmacy will notify patient once order is ready.

## 2022-04-04 DIAGNOSIS — Z90.410 POST-PANCREATECTOMY DIABETES (H): Primary | ICD-10-CM

## 2022-04-04 DIAGNOSIS — E89.1 POST-PANCREATECTOMY DIABETES (H): Primary | ICD-10-CM

## 2022-04-04 DIAGNOSIS — E13.9 POST-PANCREATECTOMY DIABETES (H): Primary | ICD-10-CM

## 2022-04-07 ENCOUNTER — TELEPHONE (OUTPATIENT)
Dept: TRANSPLANT | Facility: CLINIC | Age: 57
End: 2022-04-07

## 2022-04-07 ENCOUNTER — TELEPHONE (OUTPATIENT)
Dept: ENDOCRINOLOGY | Facility: CLINIC | Age: 57
End: 2022-04-07
Payer: COMMERCIAL

## 2022-04-07 DIAGNOSIS — Z90.410 POST-PANCREATECTOMY DIABETES (H): Primary | ICD-10-CM

## 2022-04-07 DIAGNOSIS — E89.1 POST-PANCREATECTOMY DIABETES (H): Primary | ICD-10-CM

## 2022-04-07 DIAGNOSIS — E13.9 POST-PANCREATECTOMY DIABETES (H): Primary | ICD-10-CM

## 2022-04-07 NOTE — TELEPHONE ENCOUNTER
Pt is scheduled to meet with CDE regarding 770G pump. Last order  22, pt will need new order please advise.      Penny  [facility] Specialist  Diabetes & Nutrition Scheduling  [phone]

## 2022-04-12 ENCOUNTER — TELEPHONE (OUTPATIENT)
Dept: TRANSPLANT | Facility: CLINIC | Age: 57
End: 2022-04-12

## 2022-04-29 ENCOUNTER — TELEPHONE (OUTPATIENT)
Dept: OBGYN | Facility: OTHER | Age: 57
End: 2022-04-29
Payer: COMMERCIAL

## 2022-04-29 NOTE — TELEPHONE ENCOUNTER
Pt was seen at  on Wednesday, 4/27, for vaginal sores.  She states they sent her to the [facility] in .  She was tested for herpes.  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    Pt is very concerned because she has been  for 40 years and hasn't have any new sexual partners so doesn't understand why she is testing positive for HSV now.    I explained to pt that HSV is a virus and most times you get the virus at a young age and it doesn't appear on paps or you don't develop symptoms until later in life.    I encouraged pt to visit the cdc website at www.cdc.gov to read more information regarding HSV.    Pt is scheduled to see Lidia on 5/18 and she can also discuss further with her at that time.    Pt verbalized understanding and agreed to plan.    Elaine Ponce RN

## 2022-05-02 ENCOUNTER — ALLIED HEALTH/NURSE VISIT (OUTPATIENT)
Dept: EDUCATION SERVICES | Facility: CLINIC | Age: 57
End: 2022-05-02
Payer: COMMERCIAL

## 2022-05-02 DIAGNOSIS — E10.649 TYPE 1 DIABETES MELLITUS WITH HYPOGLYCEMIA AND WITHOUT COMA (H): Primary | ICD-10-CM

## 2022-05-02 NOTE — PROGRESS NOTES
Diabetes Education     No referral in place, and patient thought the appointment was cancelled.   Discussed a few general topics briefly.  Chantell is interested in a different pump.  Discussed the difference between Tandem and Omnipod.  Due to history of hypoglycemia would recommend a pump with automated dosing for the suspend features.  Is seeing Dr. Maher in one month and recommend looking at these two systems in detail.  Would recommend patient establish with a local educator and patient agrees with this plan and is in favor of. Notes blood sugars have been running higher.  Asked about mealtime boluses and Chantell notes being very good about this due to blood sugars running higher.   Recommend uploading pump incase there could be setting changes made before the appointment next month and establishing with  endocrinology.   Chantell will work on uploading to [employer] and updating.   Note sent to Dr. Maher about adding a new Diabetes ed referral.         Mitali Rodriguez RD, LD, [facility]  Diabetes Education

## 2022-05-06 NOTE — TELEPHONE ENCOUNTER
Thank you Dr. Maher!   Can you enter a new Diabetes ed referral please?     Thanks,   Staci Rodriguez RD, LD, [facility]  Diabetes Education

## 2022-05-07 ENCOUNTER — HEALTH MAINTENANCE LETTER (OUTPATIENT)
Age: 57
End: 2022-05-07

## 2022-05-14 DIAGNOSIS — Z94.9 CELL TRANSPLANT: Primary | ICD-10-CM

## 2022-05-16 RX ORDER — INSULIN ASPART 100 [IU]/ML
INJECTION, SOLUTION INTRAVENOUS; SUBCUTANEOUS
Qty: 10 ML | Refills: 0 | Status: SHIPPED | OUTPATIENT
Start: 2022-05-16 | End: 2022-05-25

## 2022-05-16 NOTE — TELEPHONE ENCOUNTER
INSULIN ASPART 100UNIT/ML ALYSHA  Last Written Prescription Date:  3/25/2022  Last Fill Quantity: 10,   # refills: 1  Last Office Visit :  11/17/2020  Future Office visit:   5/16/2022    Routing refill request to provider for review/approval because:  Pt has visits today 5/16/2022  Refer to clinic/provider for review and refills.       Kalli Ge RN  Central Triage Red Flags/Med Refills

## 2022-05-25 DIAGNOSIS — Z94.9 CELL TRANSPLANT: ICD-10-CM

## 2022-05-25 RX ORDER — INSULIN ASPART 100 [IU]/ML
INJECTION, SOLUTION INTRAVENOUS; SUBCUTANEOUS
Qty: 10 ML | Refills: 0 | Status: SHIPPED | OUTPATIENT
Start: 2022-05-25 | End: 2022-07-28

## 2022-05-25 RX ORDER — INSULIN ASPART 100 [IU]/ML
INJECTION, SOLUTION INTRAVENOUS; SUBCUTANEOUS
Qty: 10 ML | Refills: 0 | Status: CANCELLED | OUTPATIENT
Start: 2022-05-25

## 2022-05-25 NOTE — TELEPHONE ENCOUNTER
insulin aspart (NOVOLOG VIAL) 100 UNITS/ML vial       Last Written Prescription Date:  5-16-22  Last Fill Quantity: 10,   # refills:  ml  Last Office Visit : 11-17-20  Future Office visit:  11-17-20    Outside lab 4-27-22  CREATININE 0.55 - 1.02 mg/dL 0.84        Routing refill request to provider for review/approval because:  Overdue A1c,   Last appt: 11-17-20  Pt out of meds:

## 2022-05-25 NOTE — TELEPHONE ENCOUNTER
Pharmacy requesting call back to discuss the script below, they are needing to discuss a direction change on the script, it was sent to them with the same directions

## 2022-06-20 ENCOUNTER — TELEPHONE (OUTPATIENT)
Dept: TRANSPLANT | Facility: CLINIC | Age: 57
End: 2022-06-20
Payer: COMMERCIAL

## 2022-06-20 NOTE — TELEPHONE ENCOUNTER
Voice mail  and e-mail message below sent to Chantell today. She has cancelled /no showed several appointments with Dr Maher and she would like to refer her to the [facility] for further care.        Yobani Abdul,    Can you please give me a call ?  I want to see if I can help you with some diabetes follow up care .    [phone]    Best wishes,  Leslie

## 2022-06-23 NOTE — NURSING NOTE
"Chief Complaint   Patient presents with     TP-IAT Transplant     POD 5 years       Initial /75  Pulse 76  Temp 98.2  F (36.8  C) (Oral)  Resp 16  Ht 1.575 m (5' 2\")  Wt 67.4 kg (148 lb 9.6 oz)  SpO2 98%  BMI 27.18 kg/m2 Estimated body mass index is 27.18 kg/(m^2) as calculated from the following:    Height as of this encounter: 1.575 m (5' 2\").    Weight as of this encounter: 67.4 kg (148 lb 9.6 oz).      " Anesthesia Pre Eval Note    Anesthesia ROS/Med Hx    Overall Review:  EKG was reviewed, Echo was reviewed and Stress test was reviewed     Anesthetic Complication History:  Patient does not have a history of anesthetic complications      Pulmonary Review:  Patient does not have a pulmonary history      Neuro/Psych Review:  Patient does not have a neuro/psych history       Cardiovascular Review:    Positive for past MI  Negative for pacemaker   Positive for CAD  Positive for CABG/stent  Positive for dysrhythmias - Paroxysmal A-fib  Positive for hypertension  Positive for hyperlipidemia    End/Other Review:  Positive for diabetes  Positive for hypothyroidism  Positive for hyperthyroidism  Positive for arthritis  Positive for cancer  Additional Results:  EKG:  No results found for this or any previous visit (from the past 4464 hour(s)). Echo:  Ejection Fraction       Date                     Value               Ref Range           Status                04/26/2021               48                  >/=50%              Final            ----------Stress Test:  No valid procedures specified.     ALLERGIES:  No Known Allergies       Last Labs        Component                Value               Date/Time                  WBC                      6.1                 06/16/2022 0934            RBC                      4.77                06/16/2022 0934            HGB                      15.7                06/16/2022 0934            HCT                      46.2                06/16/2022 0934            MCV                      96.9                06/16/2022 0934            MCH                      32.9                06/16/2022 0934            MCHC                     34.0                06/16/2022 0934            RDW-CV                   12.8                06/16/2022 0934            Sodium                   139                 06/16/2022 0934            Potassium                4.5                 06/16/2022 0934             Chloride                 109                 06/16/2022 0934            Carbon Dioxide           25                  06/16/2022 0934            Glucose                  90                  06/16/2022 0934            BUN                      24 (H)              06/16/2022 0934            Creatinine               0.80                06/16/2022 0934            Glomerular Filtrati*     >90                 06/16/2022 0934            Calcium                  9.0                 06/16/2022 0934            PLT                      176                 06/16/2022 0934        Past Medical History:  06/14/2017: Abnormal cardiovascular stress test  No date: Acute sinusitis  No date: AF (atrial fibrillation) (CMS/HCC)  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      Comment:  Saw dr lisa higgins and started on methimazole --has                thyroid nodules also   No date: Arthritis  10/15/2019: At risk for amiodarone toxicity with long term use  04/29/2019: Atherosclerosis of aorta (CMS/HCC)  No date: Congestive cardiac failure (CMS/HCC)  04/29/2019: Cyst of soft tissue  No date: Diabetes (CMS/HCC)      Comment:  Diet Controlled  02/03/2021: Elevated cholesterol/high density lipoprotein ratio  No date: Essential (primary) hypertension  10/13/2017: H/O cardiovascular stress test  No date: High cholesterol  No date: Malignant neoplasm (CMS/HCC)  07/02/2021: Multiple thyroid nodules      Comment:  Saw dr lisa higgins and also started on methimazole for                amiodarone induced hyperthyroidism  05/1988: Myocardial infarction (CMS/HCC)  04/29/2019: Pre-op evaluation  No date: Thyroid disease  No date: Tinnitus  No date: Unil inguinal hernia, w obst, w/o gangr, not spcf as recur    Past Surgical History:  No date: Ablation - atrial fibrillation      Comment:  9/22/2021 11/1988: Cabg, vein, four  11/1988: Coronary artery bypass graft  No date: Coronary stent placement  No date: Skin lesion excision      Comment:    vesta--benign  12/06/2021: Thyroidectomy  No date: Transthoracic insert cath stent place w cath remove & cl  No date: Vasectomy       Prior to Admission medications :  Medication aspirin (ECOTRIN) 81 MG EC tablet, Sig Take 81 mg by mouth daily., Start Date , End Date , Taking? Yes, Authorizing Provider Outside Provider    Medication levothyroxine 137 MCG tablet, Sig TAKE 1 TABLET BY MOUTH DAILY. AND EVERY OTHER WEEK TAKE AN EXTRA 1/2 TABLET, Start Date 2/7/22, End Date , Taking? Yes, Authorizing Provider Louise Courtney MD    Medication amLODIPine (NORVASC) 10 MG tablet, Sig Take 1 tablet by mouth daily. TAKE 1 TABLET BY MOUTH EVERY DAY, Start Date 12/30/21, End Date 12/30/22, Taking? Yes, Authorizing Provider Andrew Pang MD    Medication atorvastatin (LIPITOR) 80 MG tablet, Sig Take 1 tablet by mouth daily. TAKE 1 TABLET AT BEDTIME, Start Date 12/30/21, End Date 12/30/22, Taking? Yes, Authorizing Provider Andrew Pang MD    Medication lisinopril (ZESTRIL) 40 MG tablet, Sig Take 1 tablet by mouth daily. TAKE 1 TABLET BY MOUTH EVERY DAY, Start Date 12/30/21, End Date 12/30/22, Taking? Yes, Authorizing Provider Andrew Pang MD    Medication metoPROLOL succinate (TOPROL-XL) 25 MG 24 hr tablet, Sig Take 0.5 tablets by mouth daily., Start Date 12/30/21, End Date , Taking? Yes, Authorizing Provider Andrew Pang MD    Medication ezetimibe (ZETIA) 10 MG tablet, Sig Take 1 tablet by mouth daily., Start Date 12/30/21, End Date , Taking? Yes, Authorizing Provider Andrew Pang MD    Medication celecoxib (CeleBREX) 100 MG capsule, Sig TAKE 1 CAPSULE BY MOUTH DAILY  Patient taking differently: Take 100 mg by mouth as needed., Start Date 11/5/21, End Date , Taking? Yes, Authorizing Provider Louise Courtney MD    Medication clopidogrel (PLAVIX) 75 MG tablet, Sig Take 1 tablet by mouth daily. TAKE 1 TABLET DAILY, Start Date 12/30/21, End Date , Taking? , Authorizing Provider Andrew Pang MD    Medication apixaBAN  (Eliquis) 5 MG Tab, Sig Take 1 tablet by mouth 2 times daily., Start Date 12/30/21, End Date , Taking? , Authorizing Provider Andrew Pang MD    Medication sildenafil (VIAGRA) 50 MG tablet, Sig Take 1 tablet by mouth as needed for Erectile Dysfunction., Start Date 6/1/21, End Date , Taking? , Authorizing Provider Andrew Pang MD            Relevant Problems   No relevant active problems       Physical Exam     Airway   Mallampati: II  TM Distance: >3 FB  Neck ROM: Full  Neck: Non-tender  TMJ Mobility: Good    Cardiovascular    Cardio Rate: Normal    Head Assessment  Head assessment: Normocephalic    General Assessment  General Assessment: Alert and oriented    Dental Exam  Dental exam normal    Pulmonary Exam    Breath sounds clear to auscultation:  Yes    Abdominal Exam  Abdominal exam normal      Anesthesia Plan:    ASA Status: 3  Anesthesia Type: General    Induction: Intravenous  Preferred Airway Type: ETT  Maintenance: Inhalational    Post-op Pain Management: Per Surgeon      Checklist  Reviewed: NPO Status, Allergies, Medications, Problem list and Past Med History  Consent/Risks Discussed Statement:  The proposed anesthetic plan, including its risks and benefits, have been discussed with the Patient along with the risks and benefits of alternatives. Questions were encouraged and answered and the patient and/or representative understands and agrees to proceed.        I discussed with the patient (and/or patient's legal representative) the risks and benefits of the proposed anesthesia plan, General, which may include services performed by other anesthesia providers.    Alternative anesthesia plans, if available, were reviewed with the patient (and/or patient's legal representative). Discussion has been held with the patient (and/or patient's legal representative) regarding risks of anesthesia, which include Nausea, Vomiting and Dental Injury and emergent situations that may require change in anesthesia  plan.    The patient (and/or patient's legal representative) has indicated understanding, his/her questions have been answered, and he/she wishes to proceed with the planned anesthetic.    Blood Products: Not Anticipated

## 2022-06-29 ENCOUNTER — TELEPHONE (OUTPATIENT)
Dept: TRANSPLANT | Facility: CLINIC | Age: 57
End: 2022-06-29

## 2022-06-29 NOTE — TELEPHONE ENCOUNTER
Third attempt to try and get hold of Chantell to discuss transferring her endocrinology care to [facility].Left a voice mail with my direct call back number.

## 2022-07-02 ENCOUNTER — HEALTH MAINTENANCE LETTER (OUTPATIENT)
Age: 57
End: 2022-07-02

## 2022-07-28 DIAGNOSIS — Z94.9 CELL TRANSPLANT: ICD-10-CM

## 2022-07-28 RX ORDER — INSULIN ASPART 100 [IU]/ML
INJECTION, SOLUTION INTRAVENOUS; SUBCUTANEOUS
Qty: 10 ML | Refills: 0 | Status: SHIPPED | OUTPATIENT
Start: 2022-07-28 | End: 2022-10-24

## 2022-07-28 NOTE — TELEPHONE ENCOUNTER
INSULIN ASPART 100UNIT/ML SOLN      Last Written Prescription Date:  5-25-22  Last Fill Quantity: 10 ml,   # refills: 0  Last Office Visit : 11-17-20  Future Office visit:  none    Routing refill request to provider for review/approval because:  Insulin pump  Last appt> 18 m,  Previous derrick refills

## 2022-09-26 ENCOUNTER — TELEPHONE (OUTPATIENT)
Dept: PEDIATRICS | Facility: CLINIC | Age: 57
End: 2022-09-26

## 2022-09-26 ENCOUNTER — OFFICE VISIT (OUTPATIENT)
Dept: INTERNAL MEDICINE | Facility: CLINIC | Age: 57
End: 2022-09-26

## 2022-09-26 ENCOUNTER — LAB (OUTPATIENT)
Dept: LAB | Facility: CLINIC | Age: 57
End: 2022-09-26
Payer: COMMERCIAL

## 2022-09-26 VITALS
OXYGEN SATURATION: 98 % | DIASTOLIC BLOOD PRESSURE: 83 MMHG | WEIGHT: 116 LBS | BODY MASS INDEX: 21.22 KG/M2 | TEMPERATURE: 98.4 F | SYSTOLIC BLOOD PRESSURE: 123 MMHG | HEART RATE: 86 BPM

## 2022-09-26 DIAGNOSIS — E10.649 TYPE 1 DIABETES MELLITUS WITH HYPOGLYCEMIA AND WITHOUT COMA (H): ICD-10-CM

## 2022-09-26 DIAGNOSIS — R10.11 RUQ ABDOMINAL PAIN: ICD-10-CM

## 2022-09-26 DIAGNOSIS — N30.01 ACUTE CYSTITIS WITH HEMATURIA: ICD-10-CM

## 2022-09-26 DIAGNOSIS — R30.0 DYSURIA: ICD-10-CM

## 2022-09-26 DIAGNOSIS — E03.8 OTHER SPECIFIED HYPOTHYROIDISM: ICD-10-CM

## 2022-09-26 DIAGNOSIS — R52 PAIN: ICD-10-CM

## 2022-09-26 DIAGNOSIS — K21.9 GASTROESOPHAGEAL REFLUX DISEASE WITHOUT ESOPHAGITIS: ICD-10-CM

## 2022-09-26 DIAGNOSIS — K86.81 EXOCRINE PANCREATIC INSUFFICIENCY: ICD-10-CM

## 2022-09-26 DIAGNOSIS — R63.4 WEIGHT LOSS: ICD-10-CM

## 2022-09-26 DIAGNOSIS — R63.4 WEIGHT LOSS: Primary | ICD-10-CM

## 2022-09-26 DIAGNOSIS — R11.0 NAUSEA: ICD-10-CM

## 2022-09-26 LAB
ALBUMIN SERPL BCG-MCNC: 4.2 G/DL (ref 3.5–5.2)
ALBUMIN UR-MCNC: NEGATIVE MG/DL
ALP SERPL-CCNC: 223 U/L (ref 35–104)
ALT SERPL W P-5'-P-CCNC: 78 U/L (ref 10–35)
ANION GAP SERPL CALCULATED.3IONS-SCNC: 14 MMOL/L (ref 7–15)
APPEARANCE UR: CLEAR
AST SERPL W P-5'-P-CCNC: 211 U/L (ref 10–35)
BILIRUB SERPL-MCNC: 0.6 MG/DL
BILIRUB UR QL STRIP: NEGATIVE
BUN SERPL-MCNC: 8.6 MG/DL (ref 6–20)
CALCIUM SERPL-MCNC: 9.8 MG/DL (ref 8.6–10)
CHLORIDE SERPL-SCNC: 92 MMOL/L (ref 98–107)
CHOLEST SERPL-MCNC: 204 MG/DL
COLOR UR AUTO: YELLOW
CREAT SERPL-MCNC: 0.59 MG/DL (ref 0.51–0.95)
CREAT UR-MCNC: 17.2 MG/DL
DEPRECATED HCO3 PLAS-SCNC: 25 MMOL/L (ref 22–29)
ERYTHROCYTE [DISTWIDTH] IN BLOOD BY AUTOMATED COUNT: 12.5 % (ref 10–15)
GFR SERPL CREATININE-BSD FRML MDRD: >90 ML/MIN/1.73M2
GLUCOSE SERPL-MCNC: 682 MG/DL (ref 70–99)
GLUCOSE UR STRIP-MCNC: >=2000 MG/DL
HAV IGM SERPL QL IA: NONREACTIVE
HBA1C MFR BLD: 13.3 %
HBV CORE AB SERPL QL IA: NONREACTIVE
HBV SURFACE AB SERPL IA-ACNC: 0 M[IU]/ML
HBV SURFACE AB SERPL IA-ACNC: NONREACTIVE M[IU]/ML
HCT VFR BLD AUTO: 40.2 % (ref 35–47)
HCV AB SERPL QL IA: NONREACTIVE
HDLC SERPL-MCNC: 111 MG/DL
HGB BLD-MCNC: 13.9 G/DL (ref 11.7–15.7)
HGB UR QL STRIP: ABNORMAL
HIV 1+2 AB+HIV1 P24 AG SERPL QL IA: NONREACTIVE
KETONES UR STRIP-MCNC: NEGATIVE MG/DL
LDLC SERPL CALC-MCNC: 79 MG/DL
LEUKOCYTE ESTERASE UR QL STRIP: NEGATIVE
LIPASE SERPL-CCNC: 7 U/L (ref 13–60)
MCH RBC QN AUTO: 31 PG (ref 26.5–33)
MCHC RBC AUTO-ENTMCNC: 34.6 G/DL (ref 31.5–36.5)
MCV RBC AUTO: 90 FL (ref 78–100)
MICROALBUMIN UR-MCNC: <12 MG/L
MICROALBUMIN/CREAT UR: NORMAL MG/G{CREAT}
NITRATE UR QL: POSITIVE
NONHDLC SERPL-MCNC: 93 MG/DL
PH UR STRIP: 5 [PH] (ref 5–7)
PLATELET # BLD AUTO: 443 10E3/UL (ref 150–450)
POTASSIUM SERPL-SCNC: 4.3 MMOL/L (ref 3.4–5.3)
PROT SERPL-MCNC: 7.4 G/DL (ref 6.4–8.3)
RBC # BLD AUTO: 4.48 10E6/UL (ref 3.8–5.2)
RBC URINE: 2 /HPF
SODIUM SERPL-SCNC: 131 MMOL/L (ref 136–145)
SP GR UR STRIP: 1 (ref 1–1.03)
SQUAMOUS EPITHELIAL: <1 /HPF
TRIGL SERPL-MCNC: 69 MG/DL
TSH SERPL DL<=0.005 MIU/L-ACNC: 3.96 UIU/ML (ref 0.3–4.2)
UROBILINOGEN UR STRIP-MCNC: NORMAL MG/DL
WBC # BLD AUTO: 9.7 10E3/UL (ref 4–11)
WBC URINE: 4 /HPF

## 2022-09-26 PROCEDURE — 86709 HEPATITIS A IGM ANTIBODY: CPT | Performed by: PATHOLOGY

## 2022-09-26 PROCEDURE — 83690 ASSAY OF LIPASE: CPT | Performed by: PATHOLOGY

## 2022-09-26 PROCEDURE — 99000 SPECIMEN HANDLING OFFICE-LAB: CPT | Performed by: PATHOLOGY

## 2022-09-26 PROCEDURE — 87086 URINE CULTURE/COLONY COUNT: CPT | Performed by: PATHOLOGY

## 2022-09-26 PROCEDURE — 87088 URINE BACTERIA CULTURE: CPT | Performed by: PATHOLOGY

## 2022-09-26 PROCEDURE — 36415 COLL VENOUS BLD VENIPUNCTURE: CPT | Performed by: PATHOLOGY

## 2022-09-26 PROCEDURE — 85027 COMPLETE CBC AUTOMATED: CPT | Performed by: PATHOLOGY

## 2022-09-26 PROCEDURE — 82043 UR ALBUMIN QUANTITATIVE: CPT | Performed by: PATHOLOGY

## 2022-09-26 PROCEDURE — 86803 HEPATITIS C AB TEST: CPT | Performed by: PATHOLOGY

## 2022-09-26 PROCEDURE — 80061 LIPID PANEL: CPT | Performed by: PATHOLOGY

## 2022-09-26 PROCEDURE — 80053 COMPREHEN METABOLIC PANEL: CPT | Performed by: PATHOLOGY

## 2022-09-26 PROCEDURE — 87186 SC STD MICRODIL/AGAR DIL: CPT | Performed by: PATHOLOGY

## 2022-09-26 PROCEDURE — 81001 URINALYSIS AUTO W/SCOPE: CPT | Performed by: PATHOLOGY

## 2022-09-26 PROCEDURE — 86706 HEP B SURFACE ANTIBODY: CPT | Performed by: PATHOLOGY

## 2022-09-26 PROCEDURE — 86704 HEP B CORE ANTIBODY TOTAL: CPT | Performed by: PATHOLOGY

## 2022-09-26 PROCEDURE — 83036 HEMOGLOBIN GLYCOSYLATED A1C: CPT | Performed by: PATHOLOGY

## 2022-09-26 PROCEDURE — 84443 ASSAY THYROID STIM HORMONE: CPT | Performed by: PATHOLOGY

## 2022-09-26 PROCEDURE — 87389 HIV-1 AG W/HIV-1&-2 AB AG IA: CPT | Performed by: PATHOLOGY

## 2022-09-26 PROCEDURE — 99214 OFFICE O/P EST MOD 30 MIN: CPT | Performed by: HOSPITALIST

## 2022-09-26 RX ORDER — FAMOTIDINE 20 MG/1
20 TABLET, FILM COATED ORAL AT BEDTIME
Qty: 30 TABLET | Refills: 0 | Status: SHIPPED | OUTPATIENT
Start: 2022-09-26

## 2022-09-26 RX ORDER — CEPHALEXIN 500 MG/1
500 CAPSULE ORAL 2 TIMES DAILY
Qty: 14 CAPSULE | Refills: 0 | Status: SHIPPED | OUTPATIENT
Start: 2022-09-26 | End: 2022-09-28

## 2022-09-26 RX ORDER — TRAMADOL HYDROCHLORIDE 50 MG/1
25-50 TABLET ORAL EVERY 6 HOURS PRN
Qty: 15 TABLET | Refills: 0 | Status: SHIPPED | OUTPATIENT
Start: 2022-09-26

## 2022-09-26 RX ORDER — ONDANSETRON 4 MG/1
4 TABLET, FILM COATED ORAL EVERY 8 HOURS PRN
Qty: 30 TABLET | Refills: 0 | Status: SHIPPED | OUTPATIENT
Start: 2022-09-26

## 2022-09-26 ASSESSMENT — ENCOUNTER SYMPTOMS
BACK PAIN: 1
HEMATURIA: 1
DIARRHEA: 0
FEVER: 1
SORE THROAT: 0
DYSURIA: 1
HEARTBURN: 1
MYALGIAS: 1
CONSTIPATION: 0
LIGHT-HEADEDNESS: 1
NERVOUS/ANXIOUS: 1
SHORTNESS OF BREATH: 0
ABDOMINAL PAIN: 1
ARTHRALGIAS: 1
FATIGUE: 1
NAUSEA: 1
CHILLS: 0

## 2022-09-26 NOTE — TELEPHONE ENCOUNTER
That would help out on recommendations to check her insulin pump site, continue efforts in hydration. I ordered a CT abdomen/pelvis without contrast to evaluate further along with Hepatitis antibodies and HIV antibodies for evaluation.     Her labs are not consistent with a DKA but with hyperglycemia, she will need to hydrate.     Abhishek Iyer MD  Internal Medicine  [facility], [place], [place]

## 2022-09-26 NOTE — ASSESSMENT & PLAN NOTE
Right upper quadrant pain with flank pains and CVA tenderness. UA with positive Nitrite and blood present. Possible kidney stone. Has a hx of cholecystectomy. Also has mild transaminitis (AST>ALT) with patient not drinking alcohol. Patient may also have gastric or peptic ulcer.     - Await Urine culture.   - Obtain CT abdomen/pelvis without contrast.   - Check Hepatitis antibodies for A, B and C along with Lipase level.   - Short course of Tramadol, 15 tablets for now.   - Continued on Pantoprazole 40mg twice a day, Sucralfate and will add famotidine 20mg at bedtime for 30 days for now. Has followed with Gastroenterology.

## 2022-09-26 NOTE — ASSESSMENT & PLAN NOTE
Attempted to call back patient with glucose high to 682. Patient's Creatinine is normal at 0.59 and normal anion gap.   - Patient will need to stay hydrated.   - Patient is on an insulin pump and will need to recheck insertion site.   - Rechecking A1c, lipid panel  - Referral to resee Endocrine.  - Patient to stay hydrated.

## 2022-09-26 NOTE — TELEPHONE ENCOUNTER
RN called patient and relayed Dr. Cummings's message about plan with imaging orders placed and lab orders.  RN gave patient radiology scheduling number.  Patient will await results and Dr. Iyer advice after results are back.    Ruth Dillard RN on 9/26/2022 at 11:42 AM

## 2022-09-26 NOTE — NURSING NOTE
Chantell Kidd is a 56 year old female that presents in clinic today for the following:     Chief Complaint   Patient presents with     Weight Loss     Back Pain     Abdominal Pain     RUQ pain, patient states they can feel a lump       The patient's allergies and medications were reviewed. The patient's vitals were obtained without incident. The patient does not have any other questions for the provider.     Juancho Ontiveros, EMT at 8:22 AM on 9/26/2022.  Primary Care Clinic: [phone]

## 2022-09-26 NOTE — TELEPHONE ENCOUNTER
[facility] Call Center    Phone Message    May a detailed message be left on voicemail: yes     Reason for Call: Patient callling Dr Iyer back.  Please call her back.    Action Taken: Message routed to:  [facility]    Travel Screening: Not Applicable

## 2022-09-26 NOTE — ASSESSMENT & PLAN NOTE
Continue Pantoprazole 40mg twice a day. Add famotidine 20mg at bedtime. Continued on sucralfate.   Added zofran for nausea as needed.

## 2022-09-26 NOTE — ASSESSMENT & PLAN NOTE
Weight loss of about 30-40 lbs. Likely from decreased appetite and poor intake. Does have poorly controlled diabetes. Glucose today of 682, no anion gap with labs. Has mild transaminitis.    - Check lipase, hepatitis panel, HIV, TSH and CT abdomen/pelvis. Awaiting Urine culture.   - Zofran for nausea as needed.

## 2022-09-26 NOTE — PATIENT INSTRUCTIONS
- Will send Zofran (ondansetron) for nausea as needed.   - Will start on Keflex twice a day for a Urine infection (sending 7 days until urine culture comes back).   - Start on Famotidine 20mg at bedtime in addition to Pantoprazole, for 30 days for now.   - Continue all other medications.   - Labs for CBC, CMP, A1c, Lipids, UA, Urine albumin today.     - Dr. Iyer to watch UA and urine culture results if we need to change antibiotic type.     - Increase you Hydrocortisone (Cortef) to 10mg in the AM, 5mg in the afternoon, 10mg at night for at least 3 days. If feeling bad once back to normal dosing, increase dosing again for another 2 days.      - Placed referral to endocrine.     Follow up with Dr. Morgan as scheduled in October for now.

## 2022-09-26 NOTE — PROGRESS NOTES
Assessment/Plan  Problem List Items Addressed This Visit        Nervous and Auditory    RUQ abdominal pain     Right upper quadrant pain with flank pains and CVA tenderness. UA with positive Nitrite and blood present. Possible kidney stone. Has a hx of cholecystectomy. Also has mild transaminitis (AST>ALT) with patient not drinking alcohol. Patient may also have gastric or peptic ulcer.     - Await Urine culture.   - Obtain CT abdomen/pelvis without contrast.   - Check Hepatitis antibodies for A, B and C along with Lipase level.   - Short course of Tramadol, 15 tablets for now.   - Continued on Pantoprazole 40mg twice a day, Sucralfate and will add famotidine 20mg at bedtime for 30 days for now. Has followed with Gastroenterology.            Relevant Orders    Lipase (Completed)    Hepatitis A antibody IgM    Hepatitis B Surface Antibody    Hepatitis B core antibody    HCV Screen with Reflex    CT Abdomen/Pelvis w/o contrast    HIV Antigen Antibody Combo       Digestive    Exocrine pancreatic insufficiency    Relevant Orders    Adult Endocrinology  Referral    Nausea    Relevant Medications    ondansetron (ZOFRAN) 4 MG tablet    Gastroesophageal reflux disease without esophagitis     Continue Pantoprazole 40mg twice a day. Add famotidine 20mg at bedtime. Continued on sucralfate.   Added zofran for nausea as needed.            Relevant Medications    ondansetron (ZOFRAN) 4 MG tablet    famotidine (PEPCID) 20 MG tablet       Endocrine    Type 1 diabetes mellitus with hypoglycemia and without coma (H)     Attempted to call back patient with glucose high to 682. Patient's Creatinine is normal at 0.59 and normal anion gap.   - Patient will need to stay hydrated.   - Patient is on an insulin pump and will need to recheck insertion site.   - Rechecking A1c, lipid panel  - Referral to resee Endocrine.  - Patient to stay hydrated.           Relevant Orders    Adult Endocrinology  Referral    Hemoglobin A1c  (Completed)    Lipid panel reflex to direct LDL Fasting (Completed)    Albumin Random Urine Quantitative with Creat Ratio (Completed)    Hypothyroidism    Relevant Orders    TSH with free T4 reflex (Completed)       Urinary    Acute cystitis with hematuria     Obtained UA today with Nitrite positive with some blood. Start on Keflex for 7 days for suspected UTI. Has right CVA tenderness. Pending urine culture.           Relevant Medications    cephALEXin (KEFLEX) 500 MG capsule       Other    Weight loss - Primary     Weight loss of about 30-40 lbs. Likely from decreased appetite and poor intake. Does have poorly controlled diabetes. Glucose today of 682, no anion gap with labs. Has mild transaminitis.    - Check lipase, hepatitis panel, HIV, TSH and CT abdomen/pelvis. Awaiting Urine culture.   - Zofran for nausea as needed.            Relevant Orders    Comprehensive metabolic panel (Completed)    CBC with platelets (Completed)      Other Visit Diagnoses     Dysuria        Relevant Orders    UA with Micro reflex to Culture (Completed)    Pain        Relevant Medications    traMADol (ULTRAM) 50 MG tablet          Results for orders placed or performed in visit on 09/26/22   UA with Micro reflex to Culture     Status: Abnormal    Specimen: Urine, Midstream   Result Value Ref Range    Color Urine Yellow Colorless, Straw, Light Yellow, Yellow    Appearance Urine Clear Clear    Glucose Urine >=2000 (A) Negative mg/dL    Bilirubin Urine Negative Negative    Ketones Urine Negative Negative mg/dL    Specific Gravity Urine 1.005 1.003 - 1.035    Blood Urine Trace (A) Negative    pH Urine 5.0 5.0 - 7.0    Protein Albumin Urine Negative Negative mg/dL    Urobilinogen Urine Normal Normal, 2.0 mg/dL    Nitrite Urine Positive (A) Negative    Leukocyte Esterase Urine Negative Negative    RBC Urine 2 <=2 /HPF    WBC Urine 4 <=5 /HPF    Squamous Epithelials Urine <1 <=1 /HPF    Narrative    Urine Culture ordered based on laboratory  criteria   Comprehensive metabolic panel     Status: Abnormal   Result Value Ref Range    Sodium 131 (L) 136 - 145 mmol/L    Potassium 4.3 3.4 - 5.3 mmol/L    Chloride 92 (L) 98 - 107 mmol/L    Carbon Dioxide (CO2) 25 22 - 29 mmol/L    Anion Gap 14 7 - 15 mmol/L    Urea Nitrogen 8.6 6.0 - 20.0 mg/dL    Creatinine 0.59 0.51 - 0.95 mg/dL    Calcium 9.8 8.6 - 10.0 mg/dL    Glucose 682 (HH) 70 - 99 mg/dL    Alkaline Phosphatase 223 (H) 35 - 104 U/L     (H) 10 - 35 U/L    ALT 78 (H) 10 - 35 U/L    Protein Total 7.4 6.4 - 8.3 g/dL    Albumin 4.2 3.5 - 5.2 g/dL    Bilirubin Total 0.6 <=1.2 mg/dL    GFR Estimate >90 >60 mL/min/1.73m2   CBC with platelets     Status: Normal   Result Value Ref Range    WBC Count 9.7 4.0 - 11.0 10e3/uL    RBC Count 4.48 3.80 - 5.20 10e6/uL    Hemoglobin 13.9 11.7 - 15.7 g/dL    Hematocrit 40.2 35.0 - 47.0 %    MCV 90 78 - 100 fL    MCH 31.0 26.5 - 33.0 pg    MCHC 34.6 31.5 - 36.5 g/dL    RDW 12.5 10.0 - 15.0 %    Platelet Count 443 150 - 450 10e3/uL       Health Maintenance Due   Topic Date Due     PREVENTIVE CARE VISIT  Never done     ADVANCE CARE PLANNING  Never done     MIGRAINE ACTION PLAN  Never done     COVID-19 Vaccine (1) Never done     ZOSTER IMMUNIZATION (1 of 2) Never done     HEPATITIS B IMMUNIZATION (1 of 3 - Risk 3-dose series) Never done     PAP  Never done     MENINGITIS IMMUNIZATION (1 - Risk start 2-23 months series) 12/28/2011     URINE DRUG SCREEN  01/23/2015     MAMMO SCREENING  06/03/2016     MICROALBUMIN  04/14/2017     DIABETIC FOOT EXAM  07/20/2017     EYE EXAM  04/10/2018     PHQ-9  10/02/2019     LIPID  07/12/2020     A1C  10/01/2021     DTAP/TDAP/TD IMMUNIZATION (2 - Td or Tdap) 11/01/2021     INFLUENZA VACCINE (1) 09/01/2022       Subjective  Patient added to schedule 6 minutes before appointment time. Patient of Dr. Morgan.     Patient mentions 3 weeks of pain when urinating and rash in her groin. Mentions she had a cold about 2 weeks ago with temperature  to 102 with shortness of breath but has improved. Mentions she has been having right upper abdominal pain for about 4 weeks and having some nausea with occasional vomiting. Mentions she might have a hernia. Has been hard for her to keep food down. Has bodyaches all over, pains when hitting her arm or leg into things. Has been experiencing some lightheadedness. Lays, has lost about 30-40 lbs in the past month. Does admit to heartburn despite use of pantoprazole.       Review of Systems   Constitutional: Positive for fatigue and fever. Negative for chills.   HENT: Negative for congestion and sore throat.    Respiratory: Negative for shortness of breath.    Cardiovascular: Negative for chest pain and peripheral edema.   Gastrointestinal: Positive for abdominal pain, heartburn and nausea. Negative for constipation and diarrhea.   Genitourinary: Positive for dysuria and hematuria.   Musculoskeletal: Positive for arthralgias, back pain and myalgias.   Skin: Positive for rash.   Neurological: Positive for light-headedness. Negative for syncope.   Psychiatric/Behavioral: The patient is nervous/anxious.        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    TPIAT     Spasm of sphincter of Oddi        Past Surgical History:   Procedure Laterality Date     ARTHROPLASTY CARPOMETACARPAL (THUMB JOINT)  5/2/2014    Procedure: ARTHROPLASTY CARPOMETACARPAL (THUMB JOINT);  Surgeon: Carina Panda MD;  Location: [facility]     CHOLECYSTECTOMY  2004     COLONOSCOPY  7/18/2014    Procedure: COLONOSCOPY;  Surgeon: Aurora Sahu MD;   [facility]     COLONOSCOPY N/A 8/1/2017    Procedure: COLONOSCOPY;  Colonoscopy and upper endoscopy;  Surgeon: Deirdre Harris MD;  [facility]     ENDOSCOPIC RETROGRADE CHOLANGIOPANCREATOGRAM       ENDOSCOPIC RETROGRADE CHOLANGIOPANCREATOGRAM  4/19/2011    Procedure:ENDOSCOPIC RETROGRADE CHOLANGIOPANCREATOGRAM; Pancreatic Stent Placement       ENDOSCOPIC RETROGRADE CHOLANGIOPANCREATOGRAM  5/26/2011    Procedure:ENDOSCOPIC RETROGRADE CHOLANGIOPANCREATOGRAM; with Pancreatic Stent Removal; Surgeon:DALE MIMS; [facility]     ENDOSCOPY UPPER, COLONOSCOPY, COMBINED  4/25/2012    Procedure:COMBINED ENDOSCOPY UPPER, COLONOSCOPY; Enteroscopy with Bile Duct Stent Removal, Colonoscopy  *Latex Safe Room*; Surgeon:GRACY GODWINIQ; [facility]     ESOPHAGOSCOPY, GASTROSCOPY, DUODENOSCOPY (EGD), COMBINED  5/26/2011    Procedure:COMBINED ESOPHAGOSCOPY, GASTROSCOPY, DUODENOSCOPY (EGD); Surgeon:DALE MIMS; [facility]     ESOPHAGOSCOPY, GASTROSCOPY, DUODENOSCOPY (EGD), COMBINED N/A 10/30/2014    Procedure: COMBINED ESOPHAGOSCOPY, GASTROSCOPY, DUODENOSCOPY (EGD), BIOPSY SINGLE OR MULTIPLE;  Surgeon: Sarai Moon MD;  [facility]     ESOPHAGOSCOPY, GASTROSCOPY, DUODENOSCOPY (EGD), COMBINED Left 7/6/2015    Procedure: COMBINED ESOPHAGOSCOPY, GASTROSCOPY, DUODENOSCOPY (EGD), BIOPSY SINGLE OR MULTIPLE;  Surgeon: Thomas Estrada MD;  [facility]     ESOPHAGOSCOPY, GASTROSCOPY, DUODENOSCOPY (EGD), COMBINED N/A 7/8/2016    Procedure: COMBINED ESOPHAGOSCOPY, GASTROSCOPY, DUODENOSCOPY (EGD), BIOPSY SINGLE OR MULTIPLE;  Surgeon: Eloy Klein MD;  [facility]     ESOPHAGOSCOPY, GASTROSCOPY, DUODENOSCOPY (EGD), COMBINED N/A 8/4/2016    Procedure: COMBINED ESOPHAGOSCOPY, GASTROSCOPY, DUODENOSCOPY (EGD), BIOPSY SINGLE OR MULTIPLE;  Surgeon: Jason Brown MD;  [facility]     ESOPHAGOSCOPY, GASTROSCOPY, DUODENOSCOPY (EGD), COMBINED  N/A 8/1/2017    Procedure: COMBINED ESOPHAGOSCOPY, GASTROSCOPY, DUODENOSCOPY (EGD);;  Surgeon: Deirdre Harris MD;  [facility]     ESOPHAGOSCOPY, GASTROSCOPY, DUODENOSCOPY (EGD), COMBINED N/A 6/12/2019    Procedure: ESOPHAGOGASTRODUODENOSCOPY (EGD);  Surgeon: Jose Francisco Perdomo MD;  [facility]     GYN SURGERY      Hysterectomy and USO     [facility] ENDOSCOPY W EUS  7/20/2011    Procedure:COMBINED ENDOSCOPIC ULTRASOUND, ESOPHAGOSCOPY, GASTROSCOPY, DUODENOSCOPY (EGD); Surgeon:DARVIN DONOHUE; [facility]     HERNIORRHAPHY VENTRAL N/A 9/15/2016    Procedure: HERNIORRHAPHY VENTRAL;  Surgeon: Juanita Bernabe MD;  [facility]     HYSTERECTOMY  1997 or 1998    USO     INCISION AND DRAINAGE ABDOMEN WASHOUT, COMBINED  8/16/2012    Procedure: COMBINED INCISION AND DRAINAGE ABDOMEN WASHOUT;  ,debridement and Drainage Post Appendectomy;  Surgeon: Ron Austin MD;  [facility]     INJECT TRANSVERSUS ABDOMINIS PLANE (TAP) BLOCK BILATERAL Bilateral 5/26/2016    Procedure: INJECT TRANSVERSUS ABDOMINIS PLANE (TAP) BLOCK BILATERAL;  Surgeon: Leonard Mccallum MD;  [facility]     LAPAROSCOPIC APPENDECTOMY  7/30/2012    Procedure: LAPAROSCOPIC APPENDECTOMY;  Open Appendectomy;  Surgeon: Ron Austin MD;  [facility]     PANCREATECTOMY, TRANSPLANT AUTO ISLET CELL, COMBINED  1/6/2012    Procedure:COMBINED PANCREATECTOMY, TRANSPLANT AUTO ISLET CELL; Total  Pancreatectomy, Auto Islet Transplant, splenectomy, 18fr. transgastric-jejunal feeding tube placement, liver biopsy; Surgeon:PALAK LEE; [facility]     REPLACE GASTROSTOMY TUBE, PERCUTANEOUS N/A 8/30/2017    Procedure: REPLACE GASTROSTOMY TUBE, PERCUTANEOUS;  GJ Tube Change;  Surgeon: Jose Nath PA-C;  [facility]     SPLENECTOMY         Family History   Problem Relation Age of Onset     Hypertension Mother      Diabetes Mother      Osteoporosis Mother      Cancer Father          pancreatic cancer     Diabetes Maternal Grandmother      Cardiovascular Maternal Grandmother      Cancer Maternal Grandfather         lung cancer     Cancer Sister         brain     Cancer Sister         liver cancer       Social History     Tobacco Use     Smoking status: Never Smoker     Smokeless tobacco: Never Used   Substance Use Topics     Alcohol use: No     Alcohol/week: 0.0 standard drinks        Objective  /83 (BP Location: Right arm, Patient Position: Sitting, Cuff Size: Adult Regular)   Pulse 86   Temp 98.4  F (36.9  C) (Oral)   Wt 52.6 kg (116 lb)   SpO2 98%   BMI 21.22 kg/m    Vitals taken by Abhishek Iyer MD    Physical Exam  Constitutional:       General: She is not in acute distress.     Appearance: She is not ill-appearing or toxic-appearing.   HENT:      Head: Normocephalic.   Cardiovascular:      Rate and Rhythm: Regular rhythm.      Heart sounds: Normal heart sounds. No murmur heard.    No friction rub. No gallop.   Pulmonary:      Effort: Pulmonary effort is normal. No respiratory distress.      Breath sounds: Normal breath sounds. No wheezing, rhonchi or rales.   Abdominal:      General: Bowel sounds are normal. There is no distension.      Palpations: Abdomen is soft. There is no mass.      Tenderness: There is abdominal tenderness.      Comments: RUQ tenderness, right flank tenderness, CVA right side. No rebound or guarding. No hernia present.   Musculoskeletal:      Right lower leg: No edema.      Left lower leg: No edema.   Skin:     General: Skin is warm and dry.   Neurological:      Mental Status: She is alert.   Psychiatric:         Mood and Affect: Mood normal.         Thought Content: Thought content normal.         I spent greater than 50% of the 30 minutes in the visit coordinating care regarding patient's recommendations towards back pain, weight loss and dysuria    Return in about 1 month (around 10/26/2022).      Abhishek Iyer MD  [facility]  INTERNAL MEDICINE [place]

## 2022-09-26 NOTE — ASSESSMENT & PLAN NOTE
Obtained UA today with Nitrite positive with some blood. Start on Keflex for 7 days for suspected UTI. Has right CVA tenderness. Pending urine culture.

## 2022-09-27 ENCOUNTER — TELEPHONE (OUTPATIENT)
Dept: TRANSPLANT | Facility: CLINIC | Age: 57
End: 2022-09-27

## 2022-09-27 NOTE — TELEPHONE ENCOUNTER
"Spoke to Chantell this Am after she called schedulers to ask why her upcoming appointment was cancelled.I explained that it was firstly scheduled in the wrong clinic and secondly Dr Maher had no appointments for several months. I told her we could not make any exceptions for her as she has cancelled /no showed several appointments at short notice. I asked her if she had Insulin for her pump and she said Yes. Her A1c in clinic yesterday was 13 and her BS over 600. Advised that she may need to go to ED for evaluation of hyperglycemia. Chantell said she had called [employer] \" this morning \" to have them help reset something, she was not clear. She stated she is wearing her pump  and is filling it. There have been no recent pump setting changes.She has not seen endocrinology or PCP for 18 months until yesterday. Her BS per Dex com are 450-500 this morning. I said I would discuss with Dr Maher. Dr Gunnar pisano said she should go to ED for evaluation as she would not be able to see Chantell until she has an open appointment. I called Chantell back twice to relay this information but as of now she has not answered her phone, I left 2 messages with my direct number  asking her to call me .  "

## 2022-09-28 ENCOUNTER — TELEPHONE (OUTPATIENT)
Dept: ENDOCRINOLOGY | Facility: CLINIC | Age: 57
End: 2022-09-28

## 2022-09-28 DIAGNOSIS — N30.01 ACUTE CYSTITIS WITH HEMATURIA: ICD-10-CM

## 2022-09-28 RX ORDER — CEPHALEXIN 500 MG/1
500 CAPSULE ORAL 2 TIMES DAILY
Qty: 14 CAPSULE | Refills: 0 | Status: SHIPPED | OUTPATIENT
Start: 2022-09-28 | End: 2023-03-08

## 2022-09-28 NOTE — TELEPHONE ENCOUNTER
Spoke at length to Chantell yesterday ( Tuesday 9/27/22) afternoon. I explained that Dr Ushain has no clinic openings until January but that IF and only IF she was able to upload her pump and CGM that I would put her on a cancellation list to be called at short notice. I suggested that she reach out to [facility] teresa and [facility] teresa to see if they can see her earlier. She reports that since she called [employer] for assistance with her pump and CGM her blood sugars are now in the high 200 range.I repeated my earleir advice that she needs to go to the ED if her blood sugars rise again.I  She states that she does have Insulin to fill her pump. I asked her if she was taking pancreatic enzymes as I do not see a prescription for these., She said she was and that Dr Morgan was prescribing these. According to her pharmacy she last picked up Insulin on August 22nd and she has no refills.They have no record of any creon prescription  going back 3 years.I will attempt to get her an urgent endocrinology appointment and reach out to her PCP to assist with refills now she has been seen and had labs.

## 2022-09-28 NOTE — TELEPHONE ENCOUNTER
[facility] Call Center    Phone Message    May a detailed message be left on voicemail: yes     Reason for Call: Other: Nic  (pt's transplant coordinator) called to get the pt scheduled into an urgent appointment slot. She is available for any questions regarding the referral.Please follow up. Thank you.    Action Taken: Other: Endo    Travel Screening: Not Applicable

## 2022-09-28 NOTE — TELEPHONE ENCOUNTER
JIMMYM 9/28/2022 for pt to c/back to schedule visit w/ Dr. Garg. Per Dr. Garg, pt can be added on 9/30/2022 @ 11am for a virtual visit. This will be an add on and will not be found with auto search. You will need to manually schedule this.

## 2022-09-28 NOTE — TELEPHONE ENCOUNTER
Writer huddled with Dr Ron in person due to a 30 minute POA opening next week. Patient has diabetes type 1 and complex history, will need more than 30 minute appointment time. Recommend check with advice line provider regarding urgent referral request.    Allison Zepeda [facility]  Adult Endocrinology  [facility]

## 2022-09-29 LAB — BACTERIA UR CULT: ABNORMAL

## 2022-09-29 NOTE — TELEPHONE ENCOUNTER
"Spoke to pt over hte phone 09/29/2022. Pt stated she has meetings tomorrow and will not be able to make the appointment. Pt looking for something in person on a different day. Pt stated she thought she needed labs as well as an appointment. Pt asks that I \"leave the scheduling to Leslie.\"   "

## 2022-09-29 NOTE — TELEPHONE ENCOUNTER
Nanci Cortez, RN  You 48 minutes ago (10:34 AM)     KB    Please offer what ever you can , as soon as possible and what works for her. This is out of my hands now.     I have left her a message asking her to  talk to you and not me about this.     I will organise the labs at a later time.   Thank you Ron.    Message text        You  Nanci Cortez RN 1 hour ago (9:40 AM)     CHET Nelson,     This patient did not want to schedule with me over the phone. Would you be willing to tell me a bit abut her availability? I could go ahead and schedule for her like we have done in the past if that works for the two of you? Please advise when able.     Thank you,   Ron    Message text        You routed conversation to [id number] 1 hour ago (9:36 AM)      Sonia Arthur MD You 1 hour ago (9:29 AM)     JIM Velasquez,   If patient is not able to make it to an urgent appointment, I would recommend the following:   -Make her an appointment with a diabetes educator ASAP (maybe the resource educator can see her within the coming week)   -Earliest available visit throughout the [facility] system   -Place on wait list for earlier openings   Thank you    Message text        Sonia Ledesma MD 2 hours ago (8:57 AM)     CHET    Please advise when able. Pt not able to make appointment for tomorrow work.     Thank you,   Ron martin

## 2022-09-29 NOTE — TELEPHONE ENCOUNTER
Yobani Harding,    Would you be willing to see this patient during a resource day/today? Please see below for context.    Thanks,  Ron

## 2022-09-29 NOTE — TELEPHONE ENCOUNTER
Does [facility] have any openings next week? This patient lives in [place].     Also when is she is seeing an endo provider?     Meaghan Calix text

## 2022-09-30 NOTE — TELEPHONE ENCOUNTER
RECORDS RECEIVED FROM: internal /ce   DATE RECEIVED: 10.12.22    NOTES (FOR ALL VISITS) STATUS DETAILS   OFFICE NOTES from referring provider internal  Abhishek Iyer MD   OFFICE NOTES from other specialist     ED NOTES internal  11/8/20    OPERATIVE REPORT  (thyroid, pituitary, adrenal, parathyroid)     MEDICATION LIST internal     IMAGING        XR (Chest) internal  11/8/20    CT (HEAD/NECK/CHEST/ABDOMEN) internal  11/8/20     LABS     DIABETES: HBGA1C, CREATININE, FASTING LIPIDS, MICROALBUMIN URINE, POTASSIUM, TSH, T4    THYROID: TSH, T4, CBC, THYRODLONULIN, TOTAL T3, FREE T4, CALCITONIN, CEA internal /ce

## 2022-10-05 NOTE — PROGRESS NOTES
Outcome for 10/05/22 12:31 PM: Mychart message sent  Juanita Wynne, DESTIN  Outcome for 10/10/22 2:35 PM: Left Voicemail   Juanita Wynne, VF  Outcome for 10/11/22 11:26 AM: Left Voicemail   Juanita Wynne, VF  Outcome for 10/11/22 3:15 PM: Left Voicemail   Juanita Wynne, VF  Outcome for 10/11/22 3:15 PM: Mychart message sent  Juanita Wynne, VF

## 2022-10-10 ENCOUNTER — ANCILLARY PROCEDURE (OUTPATIENT)
Dept: CT IMAGING | Facility: CLINIC | Age: 57
End: 2022-10-10
Attending: HOSPITALIST
Payer: COMMERCIAL

## 2022-10-10 DIAGNOSIS — R10.11 RUQ ABDOMINAL PAIN: ICD-10-CM

## 2022-10-10 PROCEDURE — 74176 CT ABD & PELVIS W/O CONTRAST: CPT | Mod: GC | Performed by: RADIOLOGY

## 2022-10-11 ENCOUNTER — TELEPHONE (OUTPATIENT)
Dept: ENDOCRINOLOGY | Facility: CLINIC | Age: 57
End: 2022-10-11

## 2022-10-11 NOTE — TELEPHONE ENCOUNTER
Called patient and left voicemail. Patient has an appointment on 10/12/22. Need to collect the last 14 days worth of blood sugar readings to prepare for patient's visit. Juanita Wynne on 10/11/2022 at 3:16 PM

## 2022-10-12 ENCOUNTER — VIRTUAL VISIT (OUTPATIENT)
Dept: ENDOCRINOLOGY | Facility: CLINIC | Age: 57
End: 2022-10-12
Payer: COMMERCIAL

## 2022-10-12 ENCOUNTER — PRE VISIT (OUTPATIENT)
Dept: ENDOCRINOLOGY | Facility: CLINIC | Age: 57
End: 2022-10-12

## 2022-10-12 DIAGNOSIS — Z90.410 POST-PANCREATECTOMY DIABETES (H): Primary | ICD-10-CM

## 2022-10-12 DIAGNOSIS — E13.9 POST-PANCREATECTOMY DIABETES (H): Primary | ICD-10-CM

## 2022-10-12 DIAGNOSIS — E89.1 POST-PANCREATECTOMY DIABETES (H): Primary | ICD-10-CM

## 2022-10-12 PROCEDURE — 99214 OFFICE O/P EST MOD 30 MIN: CPT | Mod: 95 | Performed by: INTERNAL MEDICINE

## 2022-10-12 NOTE — PROGRESS NOTES
Chantell Kidd  is being evaluated via a billable video visit.      How would you like to obtain your AVS? [employer]  For the video visit, send the invitation by: Text to cell phone: [phone]  Will anyone else be joining your video visit? Alyssa      Chantell is a 56 year old female presents today for Diabetes    HPI  56-year-old female who is here for management of post pancreatectomy diabetes.    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    At the time of the procedure, the patient received 420,000 IEQ, or 5,438 IEQ/kg body weight.  She did not have diabetes before the procedure.  Patient has history of labile and difficult to manage diabetes since pancreatectomy.  She has had multiple episodes of severe hypoglycemia.  She usually follows with Dr. Maher.  Has been seen by adult inpatient diabetes team during hospitalizations  Patient notes that she is taking pancreatic enzymes regularly.  She is concerned that her glucose readings are consistently running high.  Also complains of about 40 pounds weight loss.  Recently treated for urinary tract infection.  Also history of chronic abdominal pain and recurrent vomiting  She is seen by adult inpatient diabetes team during hospitalizations.    She is currently using Medtronic 670 G insulin pump with Guardian sensor  She is unable to upload pump data from home.  Patient was able to provide some glucose readings over the phone and readings are frequently above 400 and even up to 600 at times.  She is thought to have residual beta cell function     She is also on hydrocortisone 10+10+5 daily for secondary adrenal insufficiency.  Notes that she is currently taking 10+10+10 daily due to recent UTI.  Dose was increased by her PCP.    Past Medical History:   Diagnosis Date     Chronic abdominal pain      Chronic pancreatitis (H)     S/P pancreatectomy     Depression with anxiety      Gastro-oesophageal reflux disease       Hypothyroidism 4/23/2015     Kidney stones      Low serum cortisol level (H)      Migraines      Other chronic pain     STOMACH     Other chronic pain     LUMBAR SPINE     Peripheral neuropathy      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    TPIAT     Spasm of sphincter of Oddi        Allergies  Allergies   Allergen Reactions     Corticosteroids Other (See Comments)     All oral, IV and injectable steroids are contraindicated (unless in life threatening situations) in Islet Auto transplant recipients. They can cause irreversible loss of islet cell function. Please contact patient's transplant care coordinator YURI Cortez RN at [phone]/pager [phone] and/or endocrinologist prior to administration.       Chocolate Flavor Rash     Breaks out when eats chocolate     Medications  Current Outpatient Medications   Medication Sig Dispense Refill     acetaminophen (TYLENOL) 325 MG tablet Take 3 tablets (975 mg) by mouth every 8 hours       alendronate (FOSAMAX) 70 MG tablet Take 1 tablet (70 mg) by mouth every 7 days On Sundays take first thing in the morning with plain water and remain upright for at least 30 minutes and until after first food of the day    Do not restart Fosamax (alendronate) until your difficulty swallowing has resolved and you have finished the entire course of fluconazole (Diflucan). 4 tablet 0     alum & mag hydroxide-simethicone (MYLANTA/MAALOX) 200-200-25 MG CHEW chewable tablet Take 1 tablet by mouth 3 times daily as needed for indigestion       amylase-lipase-protease (CREON 12) 98722 units CPEP Take 6 to 8 capsules by mouth with meals and take 4 capsules with snacks. Needs up to 25 capsules per day 750 capsule 5     cephALEXin (KEFLEX) 500 MG capsule Take 1 capsule (500 mg) by mouth 2 times daily To complete a second course for a 14 day total of antibiotics. 14 capsule 0     CONTOUR NEXT TEST test strip USE TO TEST BLOOD SUGAR 6 TIMES DAILY OR AS DIRECTED 600 strip 1      cyclobenzaprine (FLEXERIL) 10 MG tablet Take 10 mg by mouth 3 times daily as needed for muscle spasms       diclofenac (FLECTOR) 1.3 % Patch Place 1 patch onto the skin 2 times daily 30 each 1     diclofenac (VOLTAREN) 1 % GEL 2 g Apply 2 g to skin four times a day as needed (to affected upper extremity joint(s)). Maximum 8g/day per joint, 16g/day total.       dicyclomine (BENTYL) 10 MG capsule Take 10 mg by mouth every 6 hours       dronabinol (MARINOL) 2.5 MG capsule Take 2 capsules (5 mg) by mouth 2 times daily as needed 56 capsule 5     DULoxetine (CYMBALTA) 30 MG capsule Take 1 capsule (30 mg) by mouth daily With 60mg capsule for total dose of 90mg 90 capsule 0     DULoxetine (CYMBALTA) 60 MG EC capsule Take 1 capsule (60 mg) by mouth daily With 30mg capsule for total dose of 90mg 90 capsule 1     famotidine (PEPCID) 20 MG tablet Take 1 tablet (20 mg) by mouth At Bedtime 30 tablet 0     hydrocortisone (CORTEF) 10 MG tablet Take 10 mg in the morning and 10 mg along with a 5 mg tablet at bedtime for a total dose of 15 mg at bedtime. Watch for hypoglycemia recurrence.       hydrocortisone (CORTEF) 5 MG tablet Take 5 mg by mouth At Bedtime along with a 10 mg tablet for a total dose of 15 mg       Injection Device for insulin (NOVOPEN ECHO) RICARDO Use with   Insulin 1 each 0     insulin aspart (NOVOLOG VIAL) 100 UNITS/ML vial USE TO FILL INSULIN PUMP NEEDS 150 UNITS EVERY 3 DAYS. For additional refills, please schedule a follow-up appointment at [phone] 10 mL 0     insulin aspart (NOVOLOG VIAL) 100 UNITS/ML vial USE TO FILL INSULIN PUMP RESERVOIR. NEEDS 150 UNITS EVERY 3 DAYS. CALL [facility] TO SCHEDULE FOLLOW UP APPOINTMENT 10 mL 1     insulin aspart (NOVOPEN ECHO) 100 UNIT/ML cartridge As  instructed per physician 5 mL 0     levothyroxine (SYNTHROID/LEVOTHROID) 112 MCG tablet Take 1 tablet (112 mcg) by mouth daily 90 tablet 3     linaclotide (LINZESS) 145 MCG capsule Take 145 mcg by mouth every morning (before  breakfast) as needed       metoclopramide (REGLAN) 5 MG tablet Take 2 tablets (10 mg) by mouth 3 times daily 180 tablet 3     Nutritional Supplements (BOOST HIGH PROTEIN) LIQD After above baseline labs are drawn, give: 6 mL/kg to maximum of 360 mL; the beverage is to be consumed within 5 minutes. (Patient taking differently: as needed After above baseline labs are drawn, give: 6 mL/kg to maximum of 360 mL; the beverage is to be consumed within 5 minutes.)  0     ondansetron (ZOFRAN) 4 MG tablet Take 1 tablet (4 mg) by mouth every 8 hours as needed for nausea 30 tablet 0     ondansetron (ZOFRAN-ODT) 4 MG ODT tab DISSOLVE ONE TABLET ON THE TONGUE EVERY 6 HOURS AS NEEDED FOR NAUSEA AND VOMITING 60 tablet 2     pantoprazole (PROTONIX) 40 MG EC tablet Take 1 tablet (40 mg) by mouth 2 times daily 180 tablet 3     polyethylene glycol (MIRALAX/GLYCOLAX) packet Take 1 packet by mouth 2 times daily as needed for constipation  14 each 5     potassium chloride ER (KLOR-CON M) 20 MEQ CR tablet Take 1 tablet (20 mEq) by mouth daily 90 tablet 1     senna-docusate (SENOKOT-S/PERICOLACE) 8.6-50 MG tablet Take 1-2 tablets by mouth daily as needed for constipation 60 tablet 3     sodium chloride (OCEAN) 0.65 % nasal spray Spray 1 spray into both nostrils daily as needed for congestion 30 mL 0     sucralfate (CARAFATE) 1 GM tablet Take 1 tablet (1 g) by mouth 4 times daily (before meals and nightly) 30 tablet 0     SUMAtriptan (IMITREX) 50 MG tablet Take 1 tablet (50 mg) by mouth at onset of headache for migraine Take 1 Tab by mouth Once as needed for Migraine Headache. May repeat after two hours.  Maximum dose 200 mg/24 hours. 30 tablet 1     topiramate (TOPAMAX) 100 MG tablet Take 1 tablet (100 mg) by mouth 2 times daily 180 tablet 1     traMADol (ULTRAM) 50 MG tablet Take 0.5-1 tablets (25-50 mg) by mouth every 6 hours as needed for moderate pain 15 tablet 0     traZODone (DESYREL) 100 MG tablet TAKE 1-2 TABLETS BY MOUTH AN HOUR  BEFORE BEDTIME 100 tablet 2     order for DME by [facility] [employer] [address] [place], [place]  Ph: [phone]  Fax: [phone]    Nutren 1.5 @ 10 ml/hr with advancement by 10 ml/hr q 8 hours to goal rate of 40 ml/hr.  This will provide 1440 kcals (27 kcal/kg/day), 65 g PRO (1.2 g/kg/day), 730 mL H2O, 169 g CHO and no Fiber daily.    1 Month 3     Family History  family history includes Cancer in her father, maternal grandfather, sister, and sister; Cardiovascular in her maternal grandmother; Diabetes in her maternal grandmother and mother; Hypertension in her mother; Osteoporosis in her mother.  Social History     Socioeconomic History     Marital status:      Spouse name: Not on file     Number of children: Not on file     Years of education: Not on file     Highest education level: Not on file   Occupational History     Not on file   Tobacco Use     Smoking status: Never     Smokeless tobacco: Never   Substance and Sexual Activity     Alcohol use: No     Alcohol/week: 0.0 standard drinks     Drug use: No     Sexual activity: Yes   Other Topics Concern     Parent/sibling w/ CABG, MI or angioplasty before 65F 55M? Not Asked   Social History Narrative     Not on file     Social Determinants of Health     Financial Resource Strain: Not on file   Food Insecurity: Not on file   Transportation Needs: Not on file   Physical Activity: Not on file   Stress: Not on file   Social Connections: Not on file   Intimate Partner Violence: Not on file   Housing Stability: Not on file         Physical Exam  There were no vitals taken for this visit.  There is no height or weight on file to calculate BMI.    GENERAL: Healthy, alert and no distress  EYES: Eyes grossly normal to inspection.  No discharge or erythema, or obvious scleral/conjunctival abnormalities.  RESP: No audible wheeze, cough, or visible cyanosis.  No visible retractions or increased work of breathing.    SKIN: Visible skin clear. No  significant rash, abnormal pigmentation or lesions.  NEURO: Cranial nerves grossly intact.  Mentation and speech appropriate for age.  PSYCH:  affect normal/bright, judgement and insight intact, normal speech and appearance well-groomed.    RESULTS  Lab Results   Component Value Date    HGB 13.9 09/26/2022    HGB 10.6 11/13/2020     Lab Results   Component Value Date    TSH 3.96 09/26/2022    TSH 3.53 06/12/2019    T4 1.04 06/12/2019              Lab Results   Component Value Date    LDL 79 09/26/2022    LDL 71 07/12/2019        No results found for: MICROALBUMIN  Lab Results   Component Value Date    ALBUMIN 4.2 09/26/2022    ALBUMIN 3.2 11/09/2020           Lab Results   Component Value Date    ALT 78 09/26/2022    ALT 50 11/09/2020               No results found for: CREATININE          Assessment and plan    Post pancreatectomy diabetes:  History of total pancreatectomy s/p autologous transplant in 2012.  Diabetes currently managed with Medtronic 670 G pump and Guardian sensor  Patient was unable to upload pump or sensor data for review.  History of difficult to control/labile diabetes with history of severe hypoglycemia  Patient will be meeting with diabetes educator today.  We will have her download her pump and sensor data at clinic and adjust setting after reviewing the download data.  Patient will also discuss changing to tandem control IQ pump with Dexcom G6 CGM.    Based on history she is not able to utilize the auto mode much with the Medtronic pump and control IQ hybrid system would likely be more beneficial for her.  Would recommend close follow-up with diabetes educator and endocrine clinic in the coming months until she can reestablish care with Dr. Maher  Patient has appointment with diabetes educator at the [facility] at 11 AM today.  We eneded appointment early today so she could make it to the diabetes educator appointment.      Chronic adrenal insufficiency: She will continue her  current dose of hydrocortisone for secondary adrenal insufficiency.  This was not addressed in detail today    RTC in 4-6 weeks    EDISON Lino    Video visit done using [employer]  Video visit start time 10:21 AM video visit end time 10:40 AM    Note: Chart documentation done in part with Dragon Voice Recognition software. Although reviewed after completion, some word and grammatical errors may remain.  Please consider this when interpreting information in this chart

## 2022-10-12 NOTE — LETTER
10/12/2022       RE: Chantell Kidd  [address]  [facility] [address]     Dear Colleague,    Thank you for referring your patient, Chantell Kidd, to the [facility] [place] [facility]. Please see a copy of my visit note below.    Outcome for 10/05/22 12:31 PM: MyMoneyPlatformhart message sent  Juanita Freeburn, VF  Outcome for 10/10/22 2:35 PM: Left Voicemail   Juanita Freeburn, VF  Outcome for 10/11/22 11:26 AM: Left Voicemail   Juanita Freeburn, VF  Outcome for 10/11/22 3:15 PM: Left Voicemail   Juanita Ricci, VF  Outcome for 10/11/22 3:15 PM: MyMoneyPlatformhart message sent  Juanita Ricci, VF              Chantell Kidd  is being evaluated via a billable video visit.      How would you like to obtain your AVS? [address]  For the video visit, send the invitation by: Text to cell phone: [phone]  Will anyone else be joining your video visit? No      Chantell is a 56 year old female presents today for Diabetes    HPI  56-year-old female who is here for management of post pancreatectomy diabetes.    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    At the time of the procedure, the patient received 420,000 IEQ, or 5,438 IEQ/kg body weight.  She did not have diabetes before the procedure.  Patient has history of labile and difficult to manage diabetes since pancreatectomy.  She has had multiple episodes of severe hypoglycemia.  She usually follows with Dr. Maher.  Has been seen by adult inpatient diabetes team during hospitalizations  Patient notes that she is taking pancreatic enzymes regularly.  She is concerned that her glucose readings are consistently running high.  Also complains of about 40 pounds weight loss.  Recently treated for urinary tract infection.  Also history of chronic abdominal pain and recurrent vomiting  She is seen by adult inpatient diabetes team during hospitalizations.    She  is currently using Medtronic 670 G insulin pump with Guardian sensor  She is unable to upload pump data from home.  Patient was able to provide some glucose readings over the phone and readings are frequently above 400 and even up to 600 at times.  She is thought to have residual beta cell function     She is also on hydrocortisone 10+10+5 daily for secondary adrenal insufficiency.  Notes that she is currently taking 10+10+10 daily due to recent UTI.  Dose was increased by her PCP.    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    TPIAT     Spasm of sphincter of Oddi        Allergies  Allergies   Allergen Reactions     Corticosteroids Other (See Comments)     All oral, IV and injectable steroids are contraindicated (unless in life threatening situations) in Islet Auto transplant recipients. They can cause irreversible loss of islet cell function. Please contact patient's transplant care coordinator YURI Cortez RN at [phone]/pager [phone] and/or endocrinologist prior to administration.       Chocolate Flavor Rash     Breaks out when eats chocolate     Medications  Current Outpatient Medications   Medication Sig Dispense Refill     acetaminophen (TYLENOL) 325 MG tablet Take 3 tablets (975 mg) by mouth every 8 hours       alendronate (FOSAMAX) 70 MG tablet Take 1 tablet (70 mg) by mouth every 7 days On Sundays take first thing in the morning with plain water and remain upright for at least 30 minutes and until after first food of the day    Do not restart Fosamax (alendronate) until your difficulty swallowing has resolved and  you have finished the entire course of fluconazole (Diflucan). 4 tablet 0     alum & mag hydroxide-simethicone (MYLANTA/MAALOX) 200-200-25 MG CHEW chewable tablet Take 1 tablet by mouth 3 times daily as needed for indigestion       amylase-lipase-protease (CREON 12) 41731 units CPEP Take 6 to 8 capsules by mouth with meals and take 4 capsules with snacks. Needs up to 25 capsules per day 750 capsule 5     cephALEXin (KEFLEX) 500 MG capsule Take 1 capsule (500 mg) by mouth 2 times daily To complete a second course for a 14 day total of antibiotics. 14 capsule 0     CONTOUR NEXT TEST test strip USE TO TEST BLOOD SUGAR 6 TIMES DAILY OR AS DIRECTED 600 strip 1     cyclobenzaprine (FLEXERIL) 10 MG tablet Take 10 mg by mouth 3 times daily as needed for muscle spasms       diclofenac (FLECTOR) 1.3 % Patch Place 1 patch onto the skin 2 times daily 30 each 1     diclofenac (VOLTAREN) 1 % GEL 2 g Apply 2 g to skin four times a day as needed (to affected upper extremity joint(s)). Maximum 8g/day per joint, 16g/day total.       dicyclomine (BENTYL) 10 MG capsule Take 10 mg by mouth every 6 hours       dronabinol (MARINOL) 2.5 MG capsule Take 2 capsules (5 mg) by mouth 2 times daily as needed 56 capsule 5     DULoxetine (CYMBALTA) 30 MG capsule Take 1 capsule (30 mg) by mouth daily With 60mg capsule for total dose of 90mg 90 capsule 0     DULoxetine (CYMBALTA) 60 MG EC capsule Take 1 capsule (60 mg) by mouth daily With 30mg capsule for total dose of 90mg 90 capsule 1     famotidine (PEPCID) 20 MG tablet Take 1 tablet (20 mg) by mouth At Bedtime 30 tablet 0     hydrocortisone (CORTEF) 10 MG tablet Take 10 mg in the morning and 10 mg along with a 5 mg tablet at bedtime for a total dose of 15 mg at bedtime. Watch for hypoglycemia recurrence.       hydrocortisone (CORTEF) 5 MG tablet Take 5 mg by mouth At Bedtime along with a 10 mg tablet for a total dose of 15 mg       Injection Device for insulin (NOVOPEN ECHO) RICARDO Use with    Insulin 1 each 0     insulin aspart (NOVOLOG VIAL) 100 UNITS/ML vial USE TO FILL INSULIN PUMP NEEDS 150 UNITS EVERY 3 DAYS. For additional refills, please schedule a follow-up appointment at [phone] 10 mL 0     insulin aspart (NOVOLOG VIAL) 100 UNITS/ML vial USE TO FILL INSULIN PUMP RESERVOIR. NEEDS 150 UNITS EVERY 3 DAYS. CALL [facility] TO SCHEDULE FOLLOW UP APPOINTMENT 10 mL 1     insulin aspart (NOVOPEN ECHO) 100 UNIT/ML cartridge As  instructed per physician 5 mL 0     levothyroxine (SYNTHROID/LEVOTHROID) 112 MCG tablet Take 1 tablet (112 mcg) by mouth daily 90 tablet 3     linaclotide (LINZESS) 145 MCG capsule Take 145 mcg by mouth every morning (before breakfast) as needed       metoclopramide (REGLAN) 5 MG tablet Take 2 tablets (10 mg) by mouth 3 times daily 180 tablet 3     Nutritional Supplements (BOOST HIGH PROTEIN) LIQD After above baseline labs are drawn, give: 6 mL/kg to maximum of 360 mL; the beverage is to be consumed within 5 minutes. (Patient taking differently: as needed After above baseline labs are drawn, give: 6 mL/kg to maximum of 360 mL; the beverage is to be consumed within 5 minutes.)  0     ondansetron (ZOFRAN) 4 MG tablet Take 1 tablet (4 mg) by mouth every 8 hours as needed for nausea 30 tablet 0     ondansetron (ZOFRAN-ODT) 4 MG ODT tab DISSOLVE ONE TABLET ON THE TONGUE EVERY 6 HOURS AS NEEDED FOR NAUSEA AND VOMITING 60 tablet 2     pantoprazole (PROTONIX) 40 MG EC tablet Take 1 tablet (40 mg) by mouth 2 times daily 180 tablet 3     polyethylene glycol (MIRALAX/GLYCOLAX) packet Take 1 packet by mouth 2 times daily as needed for constipation  14 each 5     potassium chloride ER (KLOR-CON M) 20 MEQ CR tablet Take 1 tablet (20 mEq) by mouth daily 90 tablet 1     senna-docusate (SENOKOT-S/PERICOLACE) 8.6-50 MG tablet Take 1-2 tablets by mouth daily as needed for constipation 60 tablet 3     sodium chloride (OCEAN) 0.65 % nasal spray Spray 1 spray into both nostrils daily as needed for  congestion 30 mL 0     sucralfate (CARAFATE) 1 GM tablet Take 1 tablet (1 g) by mouth 4 times daily (before meals and nightly) 30 tablet 0     SUMAtriptan (IMITREX) 50 MG tablet Take 1 tablet (50 mg) by mouth at onset of headache for migraine Take 1 Tab by mouth Once as needed for Migraine Headache. May repeat after two hours.  Maximum dose 200 mg/24 hours. 30 tablet 1     topiramate (TOPAMAX) 100 MG tablet Take 1 tablet (100 mg) by mouth 2 times daily 180 tablet 1     traMADol (ULTRAM) 50 MG tablet Take 0.5-1 tablets (25-50 mg) by mouth every 6 hours as needed for moderate pain 15 tablet 0     traZODone (DESYREL) 100 MG tablet TAKE 1-2 TABLETS BY MOUTH AN HOUR BEFORE BEDTIME 100 tablet 2     order for DME by [employer] [facility] [place], [place]  Ph: [phone]  Fax: [phone]    Nutren 1.5 @ 10 ml/hr with advancement by 10 ml/hr q 8 hours to goal rate of 40 ml/hr.  This will provide 1440 kcals (27 kcal/kg/day), 65 g PRO (1.2 g/kg/day), 730 mL H2O, 169 g CHO and no Fiber daily.    1 Month 3     Family History  family history includes Cancer in her father, maternal grandfather, sister, and sister; Cardiovascular in her maternal grandmother; Diabetes in her maternal grandmother and mother; Hypertension in her mother; Osteoporosis in her mother.  Social History     Socioeconomic History     Marital status:      Spouse name: Not on file     Number of children: Not on file     Years of education: Not on file     Highest education level: Not on file   Occupational History     Not on file   Tobacco Use     Smoking status: Never     Smokeless tobacco: Never   Substance and Sexual Activity     Alcohol use: No     Alcohol/week: 0.0 standard drinks     Drug use: No     Sexual activity: Yes   Other Topics Concern     Parent/sibling w/ CABG, MI or angioplasty before 65F 55M? Not Asked   Social History Narrative     Not on file     Social Determinants of Health     Financial Resource Strain:  Not on file   Food Insecurity: Not on file   Transportation Needs: Not on file   Physical Activity: Not on file   Stress: Not on file   Social Connections: Not on file   Intimate Partner Violence: Not on file   Housing Stability: Not on file         Physical Exam  There were no vitals taken for this visit.  There is no height or weight on file to calculate BMI.    GENERAL: Healthy, alert and no distress  EYES: Eyes grossly normal to inspection.  No discharge or erythema, or obvious scleral/conjunctival abnormalities.  RESP: No audible wheeze, cough, or visible cyanosis.  No visible retractions or increased work of breathing.    SKIN: Visible skin clear. No significant rash, abnormal pigmentation or lesions.  NEURO: Cranial nerves grossly intact.  Mentation and speech appropriate for age.  PSYCH:  affect normal/bright, judgement and insight intact, normal speech and appearance well-groomed.    RESULTS  Lab Results   Component Value Date    HGB 13.9 09/26/2022    HGB 10.6 11/13/2020     Lab Results   Component Value Date    TSH 3.96 09/26/2022    TSH 3.53 06/12/2019    T4 1.04 06/12/2019              Lab Results   Component Value Date    LDL 79 09/26/2022    LDL 71 07/12/2019        No results found for: MICROALBUMIN  Lab Results   Component Value Date    ALBUMIN 4.2 09/26/2022    ALBUMIN 3.2 11/09/2020           Lab Results   Component Value Date    ALT 78 09/26/2022    ALT 50 11/09/2020               No results found for: CREATININE          Assessment and plan    Post pancreatectomy diabetes:  History of total pancreatectomy s/p autologous transplant in 2012.  Diabetes currently managed with Medtronic 670 G pump and Guardian sensor  Patient was unable to upload pump or sensor data for review.  History of difficult to control/labile diabetes with history of severe hypoglycemia  Patient will be meeting with diabetes educator today.  We will have her download her pump and sensor data at clinic and adjust setting after  reviewing the download data.  Patient will also discuss changing to tandem control IQ pump with Dexcom G6 CGM.    Based on history she is not able to utilize the auto mode much with the Medtronic pump and control IQ hybrid system would likely be more beneficial for her.  Would recommend close follow-up with diabetes educator and endocrine clinic in the coming months until she can reestablish care with Dr. Maher  Patient has appointment with diabetes educator at the [facility] at 11 AM today.  We eneded appointment early today so she could make it to the diabetes educator appointment.      Chronic adrenal insufficiency: She will continue her current dose of hydrocortisone for secondary adrenal insufficiency.  This was not addressed in detail today    RTC in 4-6 weeks    EDISON Lino    Video visit done using Hello Agent  Video visit start time 10:21 AM video visit end time 10:40 AM    Note: Chart documentation done in part with Dragon Voice Recognition software. Although reviewed after completion, some word and grammatical errors may remain.  Please consider this when interpreting information in this chart

## 2022-10-21 ENCOUNTER — TELEPHONE (OUTPATIENT)
Dept: EDUCATION SERVICES | Facility: CLINIC | Age: 57
End: 2022-10-21

## 2022-10-21 ENCOUNTER — ALLIED HEALTH/NURSE VISIT (OUTPATIENT)
Dept: EDUCATION SERVICES | Facility: CLINIC | Age: 57
End: 2022-10-21
Payer: COMMERCIAL

## 2022-10-21 DIAGNOSIS — Z90.410 POST-PANCREATECTOMY DIABETES (H): Primary | ICD-10-CM

## 2022-10-21 DIAGNOSIS — E13.9 POST-PANCREATECTOMY DIABETES (H): Primary | ICD-10-CM

## 2022-10-21 DIAGNOSIS — E89.1 POST-PANCREATECTOMY DIABETES (H): Primary | ICD-10-CM

## 2022-10-21 PROCEDURE — G0108 DIAB MANAGE TRN  PER INDIV: HCPCS

## 2022-10-21 RX ORDER — INSULIN PMP CART,AUT,G6/7,CNTR
1 EACH SUBCUTANEOUS SEE ADMIN INSTRUCTIONS
Qty: 35 EACH | Refills: 3 | Status: SHIPPED | OUTPATIENT
Start: 2022-10-21 | End: 2023-10-26

## 2022-10-21 RX ORDER — PROCHLORPERAZINE 25 MG/1
SUPPOSITORY RECTAL
Qty: 1 EACH | Refills: 0 | Status: SHIPPED | OUTPATIENT
Start: 2022-10-21

## 2022-10-21 RX ORDER — PROCHLORPERAZINE 25 MG/1
SUPPOSITORY RECTAL
Qty: 9 EACH | Refills: 3 | Status: SHIPPED | OUTPATIENT
Start: 2022-10-21

## 2022-10-21 RX ORDER — INSULIN PMP CART,AUT,G6/7,CNTR
1 EACH SUBCUTANEOUS SEE ADMIN INSTRUCTIONS
Qty: 1 KIT | Refills: 0 | Status: SHIPPED | OUTPATIENT
Start: 2022-10-21

## 2022-10-21 RX ORDER — PROCHLORPERAZINE 25 MG/1
SUPPOSITORY RECTAL
Qty: 1 EACH | Refills: 3 | Status: SHIPPED | OUTPATIENT
Start: 2022-10-21

## 2022-10-21 NOTE — PATIENT INSTRUCTIONS
- Contact information given for our Medtronic rep, Christiana gomez. Pt encouraged to call rep today and ask rep to do a teaching session about Auto Mode use either in person or virtually. Pt advised to start Auto Mode use as soon as possible.    - Advised to enter all bg readings into pump and bolus. Should be checking g bg at least four times a day.     - Advised to enter all carb intake into pump and bolus.     Nydia Carmichael RN, BSN, [facility]   Certified Diabetes Care/  [facility]

## 2022-10-21 NOTE — TELEPHONE ENCOUNTER
Writer spoke with pt over the phone.     Nydia Carmichael, RN, BSN, [facility]   Certified Diabetes Care/  [facility]

## 2022-10-21 NOTE — TELEPHONE ENCOUNTER
[facility] Call Center    Phone Message    May a detailed message be left on voicemail: yes     Reason for Call: Other:     Pt returned Nydia's call. Pt stated they will be unable to return today, 10/21/2022. Pt is requesting that Nydia give them a call back.    Action Taken: Message routed to:  Adult Clinics: Endocrinology p [phone]    Travel Screening: Not Applicable

## 2022-10-21 NOTE — PROGRESS NOTES
Diabetes Self-Management Education & Support    Presents for: Individual review    Type of Service: In Person Visit    Assessment Type:   ASSESSMENT:  Pt referred to Cde by her endo for review of bg/medication. Pt has been using the 670G pump with guardian sensor. It is now out of warranty. Pt is interested in exploring the OmniPod 5 pump with the Dexcom G6 sensor.    Pt diagnosed in  with post pancreatectomy diabetes. Has a history of hypoglycemia.   Concern today for weight loss over the past month and hyperglycemia for same length of time.    OmniPod 5 pump and Dexcom G6 sensor demonstrated to patient. Pt very interested in ordering both.     ------------------------------------------------------------------------------------------------------------------------------------------    PUMP downloaded and report reviewed (2 week period).    Pt is not using pump in Auto Mode. States no one taught her how to use it.   Enters bg readings into pump on the average 0-2 times a day. Enters carb intake into pump on the average 0-3 times a day.      Ave B +/- 77mg/dl  TDD insulin: 19.7 units. Basal: 14.3 units. Bolus: 5.4u   Infusion set change: averages every 4 days.       Patient's most recent   Lab Results   Component Value Date    A1C 13.3 2022    A1C 7.3 2020    HEMOGLOBINA1 9.0 2021     is not meeting goal of <7.0      Diabetes knowledge and skills assessment:   Patient is knowledgeable in diabetes management concepts related to: Pt needs review of all facets ofdiabetes self mgmt education.     Based on learning assessment above, most appropriate setting for further diabetes education would be: Individual setting.        PLAN  - Contact information given for our Medtronic rep, Christiana gomez. Pt encouraged to call rep today and ask rep to do a teaching session about Auto Mode use either in person or virtually. Pt advised to start Auto Mode use as soon as possible.  - Advised to enter all bg  "readings into pump and bolus. Should be checking g bg at least four times a day.   - Advised to enter all carb intake into pump and bolus.   - Pt advised recent weight loss and increased urination is most likely due to hyperglycemia.       Topics to cover at upcoming visits:   - Review target bg levels, pre meal and post meal.   - Review proper pump and sensor use.   - Review carb-counting skills.  - Review Medical ID, Sick Day Guidelines, Pump malfunction/back up plan.        Education Materials Provided:  DKA and No new materials provided today      SUBJECTIVE/OBJECTIVE:  Presents for: Individual review  Accompanied by: Self  Diabetes education in the past 24mo: No  Focus of Visit: Taking Medication  Diabetes type: Secondary Diabetes  Date of diagnosis: 2012  Disease course: Stable  How confident are you filling out medical forms by yourself:: Extremely  Diabetes management related comments/concerns: Hyperglycemia  Transportation concerns: No  Difficulty affording diabetes medication?: No  Difficulty affording diabetes testing supplies?: No  Other concerns:: None  Cultural Influences/Ethnic Background:  Choose not to answer      Diabetes Symptoms & Complications:  Fatigue: No  Neuropathy: No  Polydipsia: No  Polyphagia: No  Polyuria: Yes  Visual change: No  Slow healing wounds: No  Other: No  Symptom course: Stable  Weight trend: Decreasing  Complications assessed today?: No    Patient Problem List and Family Medical History reviewed for relevant medical history, current medical status, and diabetes risk factors.    Vitals:  There were no vitals taken for this visit.  Estimated body mass index is 21.22 kg/m  as calculated from the following:    Height as of 11/8/20: 1.575 m (5' 2\").    Weight as of 9/26/22: 52.6 kg (116 lb).   Last 3 BP:   BP Readings from Last 3 Encounters:   09/26/22 123/83   04/01/21 120/74   11/12/20 107/62       History   Smoking Status     Never   Smokeless Tobacco     Never       Labs:  Lab " Results   Component Value Date    A1C 13.3 09/26/2022    A1C 7.3 11/09/2020    HEMOGLOBINA1 9.0 04/01/2021     Lab Results   Component Value Date     09/26/2022     11/12/2020     Lab Results   Component Value Date    LDL 79 09/26/2022    LDL 71 07/12/2019     HDL Cholesterol   Date Value Ref Range Status   07/12/2019 83 >49 mg/dL Final     Direct Measure HDL   Date Value Ref Range Status   09/26/2022 111 >=50 mg/dL Final   ]  GFR Estimate   Date Value Ref Range Status   09/26/2022 >90 >60 mL/min/1.73m2 Final     Comment:     Effective December 21, 2021 eGFRcr in adults is calculated using the 2021 CKD-EPI creatinine equation which includes age and gender (Corky et al., NEJM, DOI: 10.1056/FGSRbc9125868)   11/12/2020 78 >60 mL/min/[1.73_m2] Final     Comment:     Non  GFR Calc  Starting 12/18/2018, serum creatinine based estimated GFR (eGFR) will be   calculated using the Chronic Kidney Disease Epidemiology Collaboration   (CKD-EPI) equation.       GFR Estimate If Black   Date Value Ref Range Status   11/12/2020 >90 >60 mL/min/[1.73_m2] Final     Comment:      GFR Calc  Starting 12/18/2018, serum creatinine based estimated GFR (eGFR) will be   calculated using the Chronic Kidney Disease Epidemiology Collaboration   (CKD-EPI) equation.       Lab Results   Component Value Date    CR 0.59 09/26/2022    CR 0.83 11/12/2020     No results found for: MICROALBUMIN    Healthy Eating:  Healthy Eating Assessed Today: No  Has patient met with a dietitian in the past?: No    Being Active:  Being Active Assessed Today: No    Monitoring:  Monitoring Assessed Today: Yes  Did patient bring glucose meter to appointment? : No  Times checking blood sugar at home (number): 1  Times checking blood sugar at home (per): Day  Blood glucose trend: Increasing      Taking Medications:  Diabetes Medication(s)     Insulin       insulin aspart (NOVOLOG VIAL) 100 UNITS/ML vial    USE TO FILL INSULIN  PUMP NEEDS 150 UNITS EVERY 3 DAYS. For additional refills, please schedule a follow-up appointment at [phone]     insulin aspart (NOVOLOG VIAL) 100 UNITS/ML vial    USE TO FILL INSULIN PUMP RESERVOIR. NEEDS 150 UNITS EVERY 3 DAYS. CALL [facility] TO SCHEDULE FOLLOW UP APPOINTMENT     insulin aspart (NOVOPEN ECHO) 100 UNIT/ML cartridge    As  instructed per physician          Taking Medication Assessed Today: Yes  Current Treatments: Insulin Pump  Problems taking diabetes medications regularly?: Yes  Diabetes medication side effects?: No    Problem Solving:  Problem Solving Assessed Today: No  Is the patient at risk for hypoglycemia?: Yes  Hypoglycemia Frequency: Rarely  Medical ID: No  Does patient have glucagon emergency kit?: No  Is the patient at risk for DKA?: No    Reducing Risks:  Reducing Risks Assessed Today: No  CAD Risks: Diabetes Mellitus    Healthy Coping:  Stage of change: CONTEMPLATION (Considering change and yet undecided)  Support resources: None  Patient Activation Measure Survey Score:  No flowsheet data found.      Time Spent: 60 minutes  Encounter Type: Individual    Cde to contact pt in two weks for bg review and to see if Auto Mode has been started.     Any diabetes medication dose changes were made via the CDE Protocol per the patient's endocrinology provider. A copy of this encounter was shared with the provider.    Chantellbree Kidd comes into clinic today at the request of Dr Macias, Ordering Provider for  Pt Teaching and bg review.     This service provided today was under the supervising provider of the day DR Carrington, who was available if needed.    Nydia Carmichael, RN, BSN, [facility]   Certified Diabetes Care/  [facility]

## 2022-10-22 DIAGNOSIS — Z94.9 CELL TRANSPLANT: ICD-10-CM

## 2022-10-24 RX ORDER — INSULIN ASPART 100 [IU]/ML
INJECTION, SOLUTION INTRAVENOUS; SUBCUTANEOUS
Qty: 60 ML | Refills: 1 | Status: SHIPPED | OUTPATIENT
Start: 2022-10-24 | End: 2023-07-12

## 2022-10-24 NOTE — TELEPHONE ENCOUNTER
INSULIN ASPART 100UNIT/ML SOLN      Last Written Prescription Date:  7/28/22  Last Fill Quantity: 10ml,   # refills: 0  Last Office Visit : 10/12/22-Dr. Macias  Future Office visit:  0    Routing refill request to provider for review/approval because:  Insulin  Send to endocrine

## 2022-10-27 ENCOUNTER — TELEPHONE (OUTPATIENT)
Dept: TRANSPLANT | Facility: CLINIC | Age: 57
End: 2022-10-27

## 2022-10-27 NOTE — TELEPHONE ENCOUNTER
Chantell has lost over 40 pounds in the last month and has been told she needs a referral to be seen with dieticireina

## 2022-10-31 ENCOUNTER — TELEPHONE (OUTPATIENT)
Dept: ENDOCRINOLOGY | Facility: CLINIC | Age: 57
End: 2022-10-31

## 2022-10-31 NOTE — TELEPHONE ENCOUNTER
"Spoke to pt over the phone 10/31/22 regarding scheduling a follow up with Dr. Macias after visit on 10/12/22. Pt states that her appointment with Dr. Macias was a \"one-time thing\" and that she plans to follow up with another provider. Pt not interested in scheduling with our clinic and would like to stop receiving calls to do so.   "

## 2022-10-31 NOTE — TELEPHONE ENCOUNTER
----- Message from Jeanne Macias MD sent at 10/31/2022  2:53 PM CDT -----  Yes, thank you  ----- Message -----  From: Ron De Jesus  Sent: 10/31/2022  12:28 PM CDT  To: Jeanne Macias MD    [facility] Dr. Macias,    Per your check out orders for this patient on 10/12/22 you asked that she be seen in six weeks (around 11/23/22). Currently there are no open slots in the recommended timeframe but there is a open RICKY slot on 11/29/22. Would it be appropriate for me to schedule this patient into that RICKY slot so that she can be seen near the reccommended timeframe? Please advise when able.    Thank you,  Ron

## 2022-11-04 ENCOUNTER — OFFICE VISIT (OUTPATIENT)
Dept: INTERNAL MEDICINE | Facility: CLINIC | Age: 57
End: 2022-11-04
Payer: COMMERCIAL

## 2022-11-04 VITALS
WEIGHT: 125.8 LBS | BODY MASS INDEX: 23.15 KG/M2 | OXYGEN SATURATION: 98 % | HEART RATE: 72 BPM | DIASTOLIC BLOOD PRESSURE: 78 MMHG | SYSTOLIC BLOOD PRESSURE: 127 MMHG | HEIGHT: 62 IN

## 2022-11-04 DIAGNOSIS — R30.0 DYSURIA: Primary | ICD-10-CM

## 2022-11-04 DIAGNOSIS — B37.31 MONILIAL VAGINITIS: ICD-10-CM

## 2022-11-04 DIAGNOSIS — K64.4 EXTERNAL HEMORRHOIDS: ICD-10-CM

## 2022-11-04 LAB
ALBUMIN UR-MCNC: NEGATIVE MG/DL
APPEARANCE UR: CLEAR
BILIRUB UR QL STRIP: NEGATIVE
COLOR UR AUTO: YELLOW
GLUCOSE UR STRIP-MCNC: >=2000 MG/DL
HGB UR QL STRIP: NEGATIVE
KETONES UR STRIP-MCNC: NEGATIVE MG/DL
LEUKOCYTE ESTERASE UR QL STRIP: NEGATIVE
MUCOUS THREADS #/AREA URNS LPF: PRESENT /LPF
NITRATE UR QL: NEGATIVE
PH UR STRIP: 5 [PH] (ref 5–7)
RBC URINE: <1 /HPF
SP GR UR STRIP: 1.01 (ref 1–1.03)
UROBILINOGEN UR STRIP-MCNC: NORMAL MG/DL
WBC URINE: <1 /HPF

## 2022-11-04 PROCEDURE — 81001 URINALYSIS AUTO W/SCOPE: CPT | Performed by: PATHOLOGY

## 2022-11-04 PROCEDURE — 99214 OFFICE O/P EST MOD 30 MIN: CPT | Performed by: INTERNAL MEDICINE

## 2022-11-04 RX ORDER — BENZOCAINE/MENTHOL 6 MG-10 MG
LOZENGE MUCOUS MEMBRANE 3 TIMES DAILY
Qty: 45 G | Refills: 3 | Status: SHIPPED | OUTPATIENT
Start: 2022-11-04

## 2022-11-04 RX ORDER — FLUCONAZOLE 150 MG/1
150 TABLET ORAL
Qty: 3 TABLET | Refills: 1 | Status: SHIPPED | OUTPATIENT
Start: 2022-11-04 | End: 2022-11-11

## 2022-11-04 NOTE — TELEPHONE ENCOUNTER
Spoke with Chantell today to discuss scheduling a follow up appointment with Dr. Maher.     Reviewed with patient that Dr Maher's next available appointment is March 2023; patient aware and agreeable to scheduling. She asked about referral to dietitian so we discussed that as patient has not been seen by Dr Maher since April 2021 it is not appropriate for her to be placing any orders for patient; clarified that this included supplies and medications for her diabetes and PERT. Patient verbalizes understanding.     Kathleen Conde RN Transplant Coordinator on November 4, 2022 5:17 PM

## 2022-11-04 NOTE — NURSING NOTE
"Chantell Kidd is a 56 year old female patient that presents today in clinic for the following:    Chief Complaint   Patient presents with     Weight Loss     Pain     UTI     Pt reports possible UTI - itching and stinging     Rectal Problem     The patient's allergies and medications were reviewed as noted. A set of vitals were recorded as noted without incident: /78 (BP Location: Right arm, Patient Position: Sitting, Cuff Size: Adult Regular)   Pulse 72   Ht 1.575 m (5' 2.01\")   Wt 57.1 kg (125 lb 12.8 oz)   SpO2 98%   BMI 23.00 kg/m  . The patient does not have any other questions for the provider.    Abdon Logan, Visit Facilitator 3:46 PM on 11/4/2022   "

## 2022-11-04 NOTE — PROGRESS NOTES
HPI  56-year-old with history of pancreatectomy and islet cell transplant and subsequent diabetes which has been poorly controlled despite a insulin pump presents today because of ongoing urinary burning and itching.  She is seen on September 26 at which time she complained of rash in the groin and fever of 102 some shortness of breath as well as dysuria.  She been eating poorly and had been losing weight associated with a poor diabetic control. At that time her urine was positive for nitrates and had a few white cells.  She was treated with with a 1 week course of Keflex.  Her culture grew staph epidermidis with universal sensitivities.  She reports no improvement in her symptoms with the antibiotics.  It largely persisted and she presents today with more burning on urination associated with some itching and vaginal discharge.  Also had a little bit of pain and discomfort in her right lower paraspinous muscles.  She denies any fever chills or sweats recently.  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    TPIAT     Spasm of sphincter of Oddi      Past Surgical History:   Procedure Laterality Date     ARTHROPLASTY CARPOMETACARPAL (THUMB JOINT)  5/2/2014    Procedure: ARTHROPLASTY CARPOMETACARPAL (THUMB JOINT);  Surgeon: Carina Panda MD;  Location: [facility]     CHOLECYSTECTOMY  2004     COLONOSCOPY  7/18/2014    Procedure: COLONOSCOPY;  Surgeon: Aurora Sahu MD;  [facility]     COLONOSCOPY N/A 8/1/2017    Procedure: COLONOSCOPY;  Colonoscopy and upper endoscopy;  Surgeon: Deirdre Harris MD;   [facility]: [facility]     ENDOSCOPIC RETROGRADE CHOLANGIOPANCREATOGRAM       ENDOSCOPIC RETROGRADE CHOLANGIOPANCREATOGRAM  4/19/2011    Procedure:ENDOSCOPIC RETROGRADE CHOLANGIOPANCREATOGRAM; Pancreatic Stent Placement       ENDOSCOPIC RETROGRADE CHOLANGIOPANCREATOGRAM  5/26/2011    Procedure:ENDOSCOPIC RETROGRADE CHOLANGIOPANCREATOGRAM; with Pancreatic Stent Removal; Surgeon:DALE MIMS; [facility]     ENDOSCOPY UPPER, COLONOSCOPY, COMBINED  4/25/2012    Procedure:COMBINED ENDOSCOPY UPPER, COLONOSCOPY; Enteroscopy with Bile Duct Stent Removal, Colonoscopy  *[facility] Safe Room*; Surgeon:GRACY GODWINIQ; [facility]     ESOPHAGOSCOPY, GASTROSCOPY, DUODENOSCOPY (EGD), COMBINED  5/26/2011    Procedure:COMBINED ESOPHAGOSCOPY, GASTROSCOPY, DUODENOSCOPY (EGD); Surgeon:DALE MIMS; [facility]     ESOPHAGOSCOPY, GASTROSCOPY, DUODENOSCOPY (EGD), COMBINED N/A 10/30/2014    Procedure: COMBINED ESOPHAGOSCOPY, GASTROSCOPY, DUODENOSCOPY (EGD), BIOPSY SINGLE OR MULTIPLE;  Surgeon: Sarai Moon MD;  [facility]     ESOPHAGOSCOPY, GASTROSCOPY, DUODENOSCOPY (EGD), COMBINED Left 7/6/2015    Procedure: COMBINED ESOPHAGOSCOPY, GASTROSCOPY, DUODENOSCOPY (EGD), BIOPSY SINGLE OR MULTIPLE;  Surgeon: Thomas Estrada MD;  [facility]     ESOPHAGOSCOPY, GASTROSCOPY, DUODENOSCOPY (EGD), COMBINED N/A 7/8/2016    Procedure: COMBINED ESOPHAGOSCOPY, GASTROSCOPY, DUODENOSCOPY (EGD), BIOPSY SINGLE OR MULTIPLE;  Surgeon: Eloy Klein MD;  [facility]     ESOPHAGOSCOPY, GASTROSCOPY, DUODENOSCOPY (EGD), COMBINED N/A 8/4/2016    Procedure: COMBINED ESOPHAGOSCOPY, GASTROSCOPY, DUODENOSCOPY (EGD), BIOPSY SINGLE OR MULTIPLE;  Surgeon: Jason Brown MD;  [facility]     ESOPHAGOSCOPY, GASTROSCOPY, DUODENOSCOPY (EGD), COMBINED N/A 8/1/2017    Procedure: COMBINED ESOPHAGOSCOPY, GASTROSCOPY, DUODENOSCOPY (EGD);;  Surgeon: Deirdre Harris MD;  Location: [facility]      ESOPHAGOSCOPY, GASTROSCOPY, DUODENOSCOPY (EGD), COMBINED N/A 6/12/2019    Procedure: ESOPHAGOGASTRODUODENOSCOPY (EGD);  Surgeon: Jose Francisco Perdomo MD;  [facility]: [facility]     GYN SURGERY      Hysterectomy and USO     [facility] ENDOSCOPY W EUS  7/20/2011    Procedure:COMBINED ENDOSCOPIC ULTRASOUND, ESOPHAGOSCOPY, GASTROSCOPY, DUODENOSCOPY (EGD); Surgeon:DARVIN DONOHUE; [facility]     HERNIORRHAPHY VENTRAL N/A 9/15/2016    Procedure: HERNIORRHAPHY VENTRAL;  Surgeon: Juanita Bernabe MD;  [facility]     HYSTERECTOMY  1997 or 1998    USO     INCISION AND DRAINAGE ABDOMEN WASHOUT, COMBINED  8/16/2012    Procedure: COMBINED INCISION AND DRAINAGE ABDOMEN WASHOUT;  ,debridement and Drainage Post Appendectomy;  Surgeon: Ron Austin MD;  [facility]     INJECT TRANSVERSUS ABDOMINIS PLANE (TAP) BLOCK BILATERAL Bilateral 5/26/2016    Procedure: INJECT TRANSVERSUS ABDOMINIS PLANE (TAP) BLOCK BILATERAL;  Surgeon: Leonard Mccallum MD;  [facility]     LAPAROSCOPIC APPENDECTOMY  7/30/2012    Procedure: LAPAROSCOPIC APPENDECTOMY;  Open Appendectomy;  Surgeon: Ron Austin MD;  [facility]     PANCREATECTOMY, TRANSPLANT AUTO ISLET CELL, COMBINED  1/6/2012    Procedure:COMBINED PANCREATECTOMY, TRANSPLANT AUTO ISLET CELL; Total  Pancreatectomy, Auto Islet Transplant, splenectomy, 18fr. transgastric-jejunal feeding tube placement, liver biopsy; Surgeon:PALAK LEE; [facility]     REPLACE GASTROSTOMY TUBE, PERCUTANEOUS N/A 8/30/2017    Procedure: REPLACE GASTROSTOMY TUBE, PERCUTANEOUS;  GJ Tube Change;  Surgeon: Jose Nath PA-C;  [facility]     SPLENECTOMY       Family History   Problem Relation Age of Onset     Hypertension Mother      Diabetes Mother      Osteoporosis Mother      Cancer Father         pancreatic cancer     Diabetes Maternal Grandmother      Cardiovascular Maternal Grandmother      Cancer Maternal Grandfather         lung cancer     Cancer  "Sister         brain     Cancer Sister         liver cancer         Exam:  /78 (BP Location: Right arm, Patient Position: Sitting, Cuff Size: Adult Regular)   Pulse 72   Ht 1.575 m (5' 2.01\")   Wt 57.1 kg (125 lb 12.8 oz)   SpO2 98%   BMI 23.00 kg/m    125 lbs 12.8 oz  PHYSICAL EXAMINATION:   The patient is alert, oriented with a clear sensorium.   Skin shows no lesions or rashes and good turgor.   Head is normocephalic and atraumatic.   Ears show normal TMs bilaterally.   Mouth shows clear oral mucosa.   Neck shows no nodes, thyromegaly or bruits.   Back is nontender over the CVAs but is tender over the right lower paraspinous muscles.   Lungs are clear to auscultation.   Heart shows normal S1 and S2 without murmur or gallop.   Abdomen is soft, nontender without masses or organomegaly.   Extremities show no edema and no evidence of active synovitis.   Her PHQ-9 equals 18  Her CIERRA-7 equals 15  Labs reviewed:  Results for orders placed or performed in visit on 11/04/22   UA with Microscopic reflex to Culture     Status: Abnormal    Specimen: Urine, Midstream   Result Value Ref Range    Color Urine Yellow Colorless, Straw, Light Yellow, Yellow    Appearance Urine Clear Clear    Glucose Urine >=2000 (A) Negative mg/dL    Bilirubin Urine Negative Negative    Ketones Urine Negative Negative mg/dL    Specific Gravity Urine 1.010 1.003 - 1.035    Blood Urine Negative Negative    pH Urine 5.0 5.0 - 7.0    Protein Albumin Urine Negative Negative mg/dL    Urobilinogen Urine Normal Normal, 2.0 mg/dL    Nitrite Urine Negative Negative    Leukocyte Esterase Urine Negative Negative    Mucus Urine Present (A) None Seen /LPF    RBC Urine <1 <=2 /HPF    WBC Urine <1 <=5 /HPF    Narrative    Urine Culture not indicated       ASSESSMENT  1 apparent monilial vaginitis  2 chronic pain syndrome  3 diabetes mellitus inadequately controlled  4 status post pancreatectomy with islet cell transplant  5 Secondary adrenal " insufficiency on replacement  6 Hypothyroid on replacement  7 Anxiety and depression    Plan  I completed her FMLA form.  We will treat her with a 3-day course of Diflucan and have her follow-up with GYN when if this does not resolve or sooner      Over 30 minutes spent on the day of service in chart review, patient contact, record completion and review and notification of lab reports    This note was completed using Dragon voice recognition software.      Ron Morgan MD  General Internal Medicine  [facility]  [phone]

## 2022-11-11 ENCOUNTER — ALLIED HEALTH/NURSE VISIT (OUTPATIENT)
Dept: EDUCATION SERVICES | Facility: CLINIC | Age: 57
End: 2022-11-11
Payer: COMMERCIAL

## 2022-11-11 DIAGNOSIS — Z90.410 POST-PANCREATECTOMY DIABETES (H): Primary | ICD-10-CM

## 2022-11-11 DIAGNOSIS — E89.1 POST-PANCREATECTOMY DIABETES (H): Primary | ICD-10-CM

## 2022-11-11 DIAGNOSIS — E13.9 POST-PANCREATECTOMY DIABETES (H): Primary | ICD-10-CM

## 2022-11-11 PROCEDURE — G0108 DIAB MANAGE TRN  PER INDIV: HCPCS

## 2022-11-14 NOTE — PROGRESS NOTES
Diabetes Self-Management Education & Support    Presents for: Individual review    Type of Service: In Person Visit    Assessment Type:   Omnipod Insulin Pump Training:  Patient transitioning from a Medtronic pump without sensor to the OmniPod 5 with the Dexcom sensor.       Introduction to PDM and Pod  PDM:  Basic settings - ID, screen,date and time. Battery, button layout and BG meter   Basal settings- max basal, basal rates, temp basal   Bolus settings- target BG, I:C ratio, correction factor, minimum Bg for bolus calculation, reverse correction, duration of insulin action, extended bolus, max bolus.    POD:  Setting up and changing pod, using room temp insulin, fill syringe- minimum and maximum amounts, DO NOT prefill pods, site selection, rotation and prep, automated cannula insertion - check infusion site/viewing window and pink slide insert, when to change the pod.     Status and Home screen actions:   Status  screen- ID screen, battery, volume remaining, time/date, last BG, last bolus, current basal rate, pod expiration and IOB (insulin on board)   Bolus - bolus calculations, manual bolus, correction bolus, meal bolus, start/cancel bolus   Suspend/resume insulin   My records- insulin/Bg/Alarm/carbohydrate, all history, event/day functionality  Settings- edit basal program, edit bolus   Advanced features- extended bolus, temp basal, additional basal programs, presets     Reminders/Alerts: custom reminders, pod expiration, low reservoir, Advisory and hazard alarms Yes     Troubleshooting: hypoglycemia, sick day management, hyperglycemia and DKA prevention No - will discuss at next appt.      [place] and uploading pump No - will discuss at next appt.     Additional topics: 24/7 Customer Care helpline [phone], removal for xray,CT, and MRI, back up plan, when and how to order pump supplies, when to call a healthcare provider. No - will discuss at next appt.     Education materials provided to patient:  Podder resource guide, getting started with arleenbeckie.    ASSESSMENT:  Pt diagnosed in 2012 with post pancreatectomy diabetes. Has a history of hypoglycemia unawareness.  Concern today for weight loss over the past month and hyperglycemia for same length of time. Presents today for pump/sensor start.     ----------------------------------------------------------------------------------------------    Insulin pump was programmed according to the Pump Start Orders. Pump settings transferred from [email or web address] to OmniPod 5 pump.Dexcom sensor synced with pump.      Patient was able to start insulin pump and continuous glucose sensor today without difficulty.     PUMP SETTINGS:  BASAL: 12am-12am: 0.80u/hr  I:C Ratio: 12am-12am: 1:25  CF: 12am-12am: 75  BG TARGET: 12am-12am: 110-120  INSULIN ACTION: 3 hrs    SENSOR SETTINGS:  Hi Alet: 300  Lo Alert: 70       PLAN:  Pt has a history of not entering carbs into pump, therefore rarely boluses for carb intake.  This is the primary reason why A1c remains consistently elevated.     In order to help patient become more compliant with bolusing for  carb intake, pt was advised to enter 25 grams carbs into pump for all meals when carb content of meal is not readily available.    Follow up with diabetes educator in 3 weeks.      SUBJECTIVE/OBJECTIVE:  Presents for: Individual review  Accompanied by: Spouse  Focus of Visit: Insulin Pump, CGM  Type of Pump visit: Pump Start  Type of CGM visit: Personal CGM Start  Diabetes type: Secondary Diabetes (Post Pancreatectomy DM, 2012)  Date of diagnosis: 2012  Disease course: Stable  How confident are you filling out medical forms by yourself:: Quite a bit  Diabetes management related comments/concerns: Hyperglycemia  Transportation concerns: No  Difficulty affording diabetes medication?: No  Difficulty affording diabetes testing supplies?: No  Other concerns:: None  Cultural Influences/Ethnic Background:  Choose not to answer      Diabetes Symptoms  "& Complications:  Fatigue: No  Neuropathy: No  Polydipsia: No  Polyphagia: No  Polyuria: No  Visual change: No  Slow healing wounds: No  Weight trend: Decreasing  Complications assessed today?: No    Patient Problem List and Family Medical History reviewed for relevant medical history, current medical status, and diabetes risk factors.    Vitals:  There were no vitals taken for this visit.  Estimated body mass index is 23 kg/m  as calculated from the following:    Height as of 11/4/22: 1.575 m (5' 2.01\").    Weight as of 11/4/22: 57.1 kg (125 lb 12.8 oz).   Last 3 BP:   BP Readings from Last 3 Encounters:   11/04/22 127/78   09/26/22 123/83   04/01/21 120/74       History   Smoking Status     Never   Smokeless Tobacco     Never       Labs:  Lab Results   Component Value Date    A1C 13.3 09/26/2022    A1C 7.3 11/09/2020    HEMOGLOBINA1 9.0 04/01/2021     Lab Results   Component Value Date     09/26/2022     11/12/2020     Lab Results   Component Value Date    LDL 79 09/26/2022    LDL 71 07/12/2019     HDL Cholesterol   Date Value Ref Range Status   07/12/2019 83 >49 mg/dL Final     Direct Measure HDL   Date Value Ref Range Status   09/26/2022 111 >=50 mg/dL Final   ]  GFR Estimate   Date Value Ref Range Status   09/26/2022 >90 >60 mL/min/1.73m2 Final     Comment:     Effective December 21, 2021 eGFRcr in adults is calculated using the 2021 CKD-EPI creatinine equation which includes age and gender (Corky tadeo al., NEJM, DOI: 10.1056/KQGKpf8797082)   11/12/2020 78 >60 mL/min/[1.73_m2] Final     Comment:     Non  GFR Calc  Starting 12/18/2018, serum creatinine based estimated GFR (eGFR) will be   calculated using the Chronic Kidney Disease Epidemiology Collaboration   (CKD-EPI) equation.       GFR Estimate If Black   Date Value Ref Range Status   11/12/2020 >90 >60 mL/min/[1.73_m2] Final     Comment:      GFR Calc  Starting 12/18/2018, serum creatinine based estimated GFR " (eGFR) will be   calculated using the Chronic Kidney Disease Epidemiology Collaboration   (CKD-EPI) equation.       Lab Results   Component Value Date    CR 0.59 09/26/2022    CR 0.83 11/12/2020     No results found for: MICROALBUMIN    Healthy Eating:  Healthy Eating Assessed Today: No  Has patient met with a dietitian in the past?: Yes    Being Active:  Being Active Assessed Today: No    Monitoring:  Monitoring Assessed Today: No      Taking Medications:  Diabetes Medication(s)     Insulin       insulin aspart (NOVOLOG VIAL) 100 UNITS/ML vial    Via insulin pump. Approx 60 units daily.     insulin aspart (NOVOLOG VIAL) 100 UNITS/ML vial    USE TO FILL INSULIN PUMP RESERVOIR. NEEDS 150 UNITS EVERY 3 DAYS. CALL [facility] TO SCHEDULE FOLLOW UP APPOINTMENT     insulin aspart (NOVOPEN ECHO) 100 UNIT/ML cartridge    As  instructed per physician          Current Treatments: Insulin Pump  Problems taking diabetes medications regularly?: No  Diabetes medication side effects?: No    Problem Solving:  Problem Solving Assessed Today: No  Is the patient at risk for hypoglycemia?: Yes  Hypoglycemia Frequency: Rarely  Hypoglycemia Treatment: Juice  Medical ID: No  Does patient have glucagon emergency kit?: Yes  Is the patient at risk for DKA?: Yes  Does patient have ketone test strips?: No  Does patient have DKA prevention plan?: No (Will discuss at future appts.)       Hypoglycemia Complications  Blackouts: No  Hospitalization: No  Nocturnal hypoglycemia: No  Required assistance: No  Required glucagon injection: No    Reducing Risks:  Reducing Risks Assessed Today: No  CAD Risks: Diabetes Mellitus  Has dilated eye exam at least once a year?: Yes  Feet checked by healthcare provider in the last year?: Yes    Healthy Coping:  Emotional response to diabetes: Ready to learn  Informal Support system:: Spouse  Stage of change: PREPARATION (Decided to change - considering how)  Support resources: None  Patient Activation Measure Survey  Score:  No flowsheet data found.  Time Spent: 90 minutes  Encounter Type: Individual      Topics to cover at upcoming visits:  Pump trouble-shooting (dka prevention)  Ketone strips  Injection back-up plan    ----------------------------------------------------------------------------------------------    Any diabetes medication dose changes were made via the CDE Protocol and Collaborative Practice Agreement with the patient's endocrinology provider. A copy of this encounter was shared with the provider.    Chantellbree Kidd comes into clinic today at the request of Dr Macias,  Ordering Provider for Pt Teaching on insulin pump and glucose sensor use.     This service provided today was under the supervising provider of the day Dr Schroeder, who was available if needed.    Time Spent: 90 minutes  Encounter Type: Individual    Nydia Carmichael, RN, BSN, CDCES   Certified Diabetes Care/  [facility], [place] Carmichael RN

## 2022-11-14 NOTE — PATIENT INSTRUCTIONS
Return to see Cde in 3 weeks for bg, medication, pump/sensor use review.       Nydia Carmichael RN, BSN, [facility]   Certified Diabetes Care/  [facility]

## 2022-11-15 ENCOUNTER — E-VISIT (OUTPATIENT)
Dept: URGENT CARE | Facility: CLINIC | Age: 57
End: 2022-11-15
Payer: COMMERCIAL

## 2022-11-15 DIAGNOSIS — N39.0 ACUTE UTI (URINARY TRACT INFECTION): Primary | ICD-10-CM

## 2022-11-15 PROCEDURE — 99207 PR NON-BILLABLE SERV PER CHARTING: CPT | Performed by: EMERGENCY MEDICINE

## 2022-11-15 NOTE — PATIENT INSTRUCTIONS
Dear Chantell Kidd,    It is very important that you be seen today due to your past health issues.          We are sorry you are not feeling well. Based on the responses you provided, it is recommended that you be seen in-person in urgent care so we can better evaluate your symptoms. Please click here to find the nearest urgent care location to you.   You will not be charged for this Visit. Thank you for trusting us with your care.    Jesus Felton MD

## 2022-11-16 ENCOUNTER — MEDICAL CORRESPONDENCE (OUTPATIENT)
Dept: EDUCATION SERVICES | Facility: CLINIC | Age: 57
End: 2022-11-16

## 2022-12-07 ENCOUNTER — VIRTUAL VISIT (OUTPATIENT)
Dept: NUTRITION | Facility: CLINIC | Age: 57
End: 2022-12-07
Payer: COMMERCIAL

## 2022-12-07 DIAGNOSIS — E03.9 HYPOTHYROIDISM, UNSPECIFIED TYPE: ICD-10-CM

## 2022-12-07 DIAGNOSIS — Z90.410 POST-PANCREATECTOMY DIABETES (H): Primary | ICD-10-CM

## 2022-12-07 DIAGNOSIS — R63.4 WEIGHT LOSS: ICD-10-CM

## 2022-12-07 DIAGNOSIS — E13.9 POST-PANCREATECTOMY DIABETES (H): Primary | ICD-10-CM

## 2022-12-07 DIAGNOSIS — K21.9 GASTROESOPHAGEAL REFLUX DISEASE WITHOUT ESOPHAGITIS: ICD-10-CM

## 2022-12-07 DIAGNOSIS — E89.1 POST-PANCREATECTOMY DIABETES (H): Primary | ICD-10-CM

## 2022-12-07 DIAGNOSIS — R10.13 EPIGASTRIC PAIN: ICD-10-CM

## 2022-12-07 PROCEDURE — 97802 MEDICAL NUTRITION INDIV IN: CPT | Mod: GT | Performed by: DIETITIAN, REGISTERED

## 2022-12-07 NOTE — PROGRESS NOTES
Medical Nutrition Therapy  Visit Type: Initial assessment and intervention    Visit Details    How would you like to obtain your AVS? MyChart  If the correspondence for visit is dropped, how would you like your dietitian to reconnect with you:   call back by phone? Yes    Text to cell phone: [phone]  Will anyone else be joining your video visit or telephone call? No  {If patient encounters technical issues they should call [phone] :20    Type of service:  Video Visit   Start Time: 1:06 PM    End Time:2:21 PM    Originating Location (pt. Location): Home    Distant Location (provider location):  [facility] WORKING VIRTUAL FROM HOME     Platform used for Video Visit: Marjan      Referring Provider: Ron Morgan,        REASON FOR REFERRAL:   Chantell Kidd is a 56 year old female who is interested in Medical Nutrition Therapy (MNT) and education related to weight. History of hypothyroid on medication but still experiencing GI issues, depression as well as brittle hair skin and nails. 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 Has a history of hypoglycemia unawareness.  Concern today for weight loss over the past month and hyperglycemia for same length of time. She has had ongoing diabetes education where was encouraged to become more compliant with bolusing for carb intake. Pt was advised to enter 25grams carbs into pump for all meals when carb content of meal is not readily available and to follow up with the educator in 3 weeks. She has been trying to follow the plan the diabetes educator provided but she has continued to lose weight and no desire to eat or energy. She is feeling tired and drained all the time. Food doesn't seem to stay down. Within 3 weeks she has lost 36lbs. Her current weight is 125lbs.     In November her PCP Dr. Morgan had recommended bone broth and smoothies, oatmeal but still struggling with issues. She didn't care for the oatmeal.  Her smoothies consistent of spinach, blueberries or bananas with almond milk. Sometimes when she did the smoothie it was fine but over the past month foods aren't staying down and coming up.  She continued with this for 2 weeks and called nurse and recommended to follow for nutrition consult. She doesn't like milk but she does still eat yogurt, cottage cheese. She is consuming wheat and gluten. She hasn't been eating for the past 3 weeks and sticking mostly to soups. She will do chicken noodle or tomatoes soup. The soups don't always stay down,and stomach still has issues. Sometimes she just feels really bloated. She will skip meals she might have soup or crackers for lunch but over the last month has not had an appetete and not hungry. She has been more tired and not feeling good. She feels it has been affecting her sleep patterns too. She can sleep from 11:30pm-1:30am gets frustratd and gets back up. She usually has some type of meat/veggie. She will do hamburger, steak or fish. She might have on the side salad or mash potatoes, corn.       She has not tried gluten or dairy free. She has not been tested for celiac disease.     Her labs show elevated A1c, cholesterol, low hemoglobin levels of 10.6, elevated AST, ALT, Alkaline Phosphatase, elevated glucose, low chloride and sodium, low lipase 2 months ago.    She has tried a probiotic in the past a couple months but that didn't seem to do anything for her. The probiotic was something she got from [employer]. She will take 6 the prescription enzymes 44175 with snacks and 8 caps with meals. She still experiences GI symptoms. She was treated with a week of Keflex and has had no improvement in her symptoms with her antibiotics.     She is accompanied by self.     NUTRITION ASSESSMENT:   No flowsheet data found.     Neurological 12/7/2022   Migraine Headaches Current   Tension Headache Current       No flowsheet data found.   Immune/Blood 12/7/2022   Anemia Current       Gastrointestinal 12/7/2022   Constipation Current   Nausea or Vomiting Current   Hemorrhoids Current   Gas/bloating Current   Gallstones Past   Heartburn/Reflux/GERD Current   Blood in stool Current   Pancreatitis Current   Irritable Bowel Syndrome (IBS) Current   Stomach ulcer Past   Abdominal Pain Current   Diverticulosis/Diverticulitis Past       No flowsheet data found.   Endocrine 12/7/2022   Type 1 diabetes Current   Thyroid issues Current   Hot flashes Current      No flowsheet data found.   Cardiopulmonary 12/7/2022   Ankle/leg swelling Current      Musculoskeletal 12/7/2022   Rheumatoid arthritis Current   Restless Legs Current   Fibromyalgia Current   Gout Past   Osteoporosis Current      Psychological 12/7/2022   Depression/Anxiety Current      No flowsheet data found.   Womens Health Assessment 12/7/2022   Hysterectomy Yes   How many ovaries were removed?  One ovary removed   I am breastfeeding. No   Are you officially in menopause? (no period for one year or longer)  No        Past Medical History:  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    TPIAT     Spasm of sphincter of Oddi        Previous Surgeries:   Past Surgical History:   Procedure Laterality Date     ARTHROPLASTY CARPOMETACARPAL (THUMB JOINT)  5/2/2014    Procedure: ARTHROPLASTY CARPOMETACARPAL (THUMB JOINT);  Surgeon: Carina Panda MD;  Location: [facility]     CHOLECYSTECTOMY  2004     COLONOSCOPY  7/18/2014    Procedure: COLONOSCOPY;  Surgeon: Aurora Sahu MD;  [facility]: [facility]     COLONOSCOPY N/A 8/1/2017    Procedure: COLONOSCOPY;   Colonoscopy and upper endoscopy;  Surgeon: Deirdre Harris MD;  [facility]: [facility]     ENDOSCOPIC RETROGRADE CHOLANGIOPANCREATOGRAM       ENDOSCOPIC RETROGRADE CHOLANGIOPANCREATOGRAM  4/19/2011    Procedure:ENDOSCOPIC RETROGRADE CHOLANGIOPANCREATOGRAM; Pancreatic Stent Placement       ENDOSCOPIC RETROGRADE CHOLANGIOPANCREATOGRAM  5/26/2011    Procedure:ENDOSCOPIC RETROGRADE CHOLANGIOPANCREATOGRAM; with Pancreatic Stent Removal; Surgeon:DALE MIMS; [facility]     ENDOSCOPY UPPER, COLONOSCOPY, COMBINED  4/25/2012    Procedure:COMBINED ENDOSCOPY UPPER, COLONOSCOPY; Enteroscopy with Bile Duct Stent Removal, Colonoscopy  *[facility] Safe Room*; Surgeon:GRACY GODWINIQ; [facility]     ESOPHAGOSCOPY, GASTROSCOPY, DUODENOSCOPY (EGD), COMBINED  5/26/2011    Procedure:COMBINED ESOPHAGOSCOPY, GASTROSCOPY, DUODENOSCOPY (EGD); Surgeon:DALE MIMS; [facility]     ESOPHAGOSCOPY, GASTROSCOPY, DUODENOSCOPY (EGD), COMBINED N/A 10/30/2014    Procedure: COMBINED ESOPHAGOSCOPY, GASTROSCOPY, DUODENOSCOPY (EGD), BIOPSY SINGLE OR MULTIPLE;  Surgeon: Sarai Moon MD;  [facility]     ESOPHAGOSCOPY, GASTROSCOPY, DUODENOSCOPY (EGD), COMBINED Left 7/6/2015    Procedure: COMBINED ESOPHAGOSCOPY, GASTROSCOPY, DUODENOSCOPY (EGD), BIOPSY SINGLE OR MULTIPLE;  Surgeon: Thomas Estrada MD;  [facility]     ESOPHAGOSCOPY, GASTROSCOPY, DUODENOSCOPY (EGD), COMBINED N/A 7/8/2016    Procedure: COMBINED ESOPHAGOSCOPY, GASTROSCOPY, DUODENOSCOPY (EGD), BIOPSY SINGLE OR MULTIPLE;  Surgeon: Eloy Klein MD;  [facility]     ESOPHAGOSCOPY, GASTROSCOPY, DUODENOSCOPY (EGD), COMBINED N/A 8/4/2016    Procedure: COMBINED ESOPHAGOSCOPY, GASTROSCOPY, DUODENOSCOPY (EGD), BIOPSY SINGLE OR MULTIPLE;  Surgeon: Jason Brown MD;  [facility]     ESOPHAGOSCOPY, GASTROSCOPY, DUODENOSCOPY (EGD), COMBINED N/A 8/1/2017    Procedure: COMBINED ESOPHAGOSCOPY, GASTROSCOPY,  DUODENOSCOPY (EGD);;  Surgeon: Deirdre Harris MD;  [facility]     ESOPHAGOSCOPY, GASTROSCOPY, DUODENOSCOPY (EGD), COMBINED N/A 6/12/2019    Procedure: ESOPHAGOGASTRODUODENOSCOPY (EGD);  Surgeon: Jose Francisco Perdomo MD;  [facility]     GYN SURGERY      Hysterectomy and USO     [facility] ENDOSCOPY W EUS  7/20/2011    Procedure:COMBINED ENDOSCOPIC ULTRASOUND, ESOPHAGOSCOPY, GASTROSCOPY, DUODENOSCOPY (EGD); Surgeon:DARVIN DONOHUE; [facility]     HERNIORRHAPHY VENTRAL N/A 9/15/2016    Procedure: HERNIORRHAPHY VENTRAL;  Surgeon: Juanita Bernabe MD;  [facility]     HYSTERECTOMY  1997 or 1998    USO     INCISION AND DRAINAGE ABDOMEN WASHOUT, COMBINED  8/16/2012    Procedure: COMBINED INCISION AND DRAINAGE ABDOMEN WASHOUT;  ,debridement and Drainage Post Appendectomy;  Surgeon: Ron Austin MD;  [facility]     INJECT TRANSVERSUS ABDOMINIS PLANE (TAP) BLOCK BILATERAL Bilateral 5/26/2016    Procedure: INJECT TRANSVERSUS ABDOMINIS PLANE (TAP) BLOCK BILATERAL;  Surgeon: Leonard Mccallum MD;  [facility]     LAPAROSCOPIC APPENDECTOMY  7/30/2012    Procedure: LAPAROSCOPIC APPENDECTOMY;  Open Appendectomy;  Surgeon: Ron Austin MD;  [facility]     PANCREATECTOMY, TRANSPLANT AUTO ISLET CELL, COMBINED  1/6/2012    Procedure:COMBINED PANCREATECTOMY, TRANSPLANT AUTO ISLET CELL; Total  Pancreatectomy, Auto Islet Transplant, splenectomy, 18fr. transgastric-jejunal feeding tube placement, liver biopsy; Surgeon:APLAK LEE; [facility]     REPLACE GASTROSTOMY TUBE, PERCUTANEOUS N/A 8/30/2017    Procedure: REPLACE GASTROSTOMY TUBE, PERCUTANEOUS;  GJ Tube Change;  Surgeon: Jose Nath PA-C;  [facility]     SPLENECTOMY          Family History:  Family History   Problem Relation Age of Onset     Hypertension Mother      Diabetes Mother      Osteoporosis Mother      Cancer Father         pancreatic cancer     Diabetes Maternal Grandmother       Cardiovascular Maternal Grandmother      Cancer Maternal Grandfather         lung cancer     Cancer Sister         brain     Cancer Sister         liver cancer        Lifestyle History:  Lifestyle 12/7/2022   Do you feel your life is stressful right now?  Yes   What is the cause(s) of stress in your life?  Work;The death of a loved one;Health Concerns;Emotional problems   Are you currently implementing any strategies to help manage stress? No        Exercise History:  Exercise 12/7/2022   Does your occupation require extended periods of sitting?  Yes   Does your occupation require extended periods of repetitive movements (ex: walking or lifting)?  Yes   Do you currently participate in any forms of exercise? Yes   Check all the exercises you participate in: Walking   How many times per week do you exercise? 3 times   How long do you usually exercise? 15 min        Sleep History:  Sleep 12/7/2022   How many hours (on average) do you sleep per night? Less than 4   What time do you turn off the lights? 10 PM   How long does it take for you to fall asleep? 30-45 mins   What time do you stop using electronic devices? 9 PM   What time do you wake up? 3 AM   When do you eat your first meal?  10 AM   Do you feel well-rested during the day?  No   Do you take naps?  No   Do you have a comfortable bedroom environment (cool, quiet, dark, etc)? Yes   Do you have a sleep routine/ ritual that you do before bed?  No   How many hours do you spend per day looking at a screen (TV, computer, tablet and phone)? 10 +   Select all factors that apply to your current sleep habits: Difficulty falling asleep;Wake up in the middle of the night;Not hungry in the morning;Take medication for sleep on most night of the week;Night sweats;Feel tired/sluggish/fatigued during the day;Have tried supplements (ie: melatonin) for sleep;Leg twitching at night        Nutrition History:  Nutrition 12/7/2022   Have you ever had a nutrition consultation?  Yes   Do you currently follow a special diet or nutritional program? Yes   Special diet or nutritional program.  Low carbohydrate   What do you feel are the biggest barriers getting in the way of achieving you nutritional goals? Lack of control over emotions/behaviors;Stress   Do you have any food allergies, sensitivities or intolerances?  Yes   Specific food(s) causing adverse reactions Other       Digestion 12/7/2022   Do you experience stomach pains/cramping? Daily   Do you experience bloating?  Daily   Do you experience gas?  Daily   Do you experience heartburn/acid reflux/indigestion? Daily   How often do you have a bowel movement? Every 3 - 4 days   What is a typical bowel movement like for you? Select all that apply: Mucous, greasy      Food Access:  12/7/2022   Who does the grocery shopping? Self;Spouse/Partner   How often is grocery shopping done? Weekly   Where do you usually receive your groceries from? Select all that apply: [employer]   Do you read food labels? Yes   What do you look at?  Calories;Carbs;Sugar   Who does the cooking? Select all that apply: Self;Spouse/Partner   How many meals do you eat out per week?  0 to 1   What restaurants do you typically choose? Bar/Grills      Daily Patterns: 12/7/2022   How many days per week do you have breakfast? 2   How many days per week do you have lunch? 4   How many days per week do you have dinner? 7   How many days per week do you have snacks? 1      Protein Intake: 12/7/2022   How many times per day do you typically consume a protein source(s)? 1   What types of protein do you currently eat?  Ground Beef;Steak;Hamburgers;Tuna;Shrimp;Deli Turkey       Fat Intake:  12/7/2022   How many times per day do you typically consume healthy fat(s)? 2   What types of health fats do you currently eat? Select all that apply:  Almonds;Cashews;Pistachios;Sawyer;Salad dressings;Olive oil       Fruit Intake:  12/7/2022   How many times per day do you typically consume  fruits? 1   What types of fruit do currently eat? Apple;Banana;Pineapple       Vegetable Intake:  12/7/2022   How many times per day do you typically consume vegetables? 2   What types of vegetables do you currently eat? Beans;Carrots;Cauliflower;Celery;Corn;Potato (baked, boiled, mashed, French fries);Spinach      Grain Intake:  12/7/2022   How many times per day do you typically consume grains? 1   What types of grains do currently eat? Select all that apply:  Crackers (non-gluten free)       Dairy Intake:  12/7/2022   How many times per day do you typically consume dairy? 1   What types of dairy do currently eat? Select all that apply:  Cheese;Yogurt       Non-Dairy Alternative Intake:  12/7/2022   How many times per day do you typically consume non-dairy alternatives? 1   What types of non-dairy alternatives do currently eat? Select all that apply:  Other yogurt       Sweets Intake:  12/7/2022   How many times per day do you typically consume sweets? 1      Beverage Intake:  12/7/2022   How many 8 oz cups of water do you typically consume per day?  8 or more   How many 8 oz cups of caffeine do you typically consume per day?  3 to 4   How many drinks of alcohol do you typically consume per week (1 drink = 5 oz wine, 12 oz beer, 1.5 oz spirits)?   1 to 3       Lifestyle Recall:  12/7/2022   What time did you wake up? 3 AM   What time did you go to sleep? 10 PM   What time did you have breakfast? 9-10 AM   Where did you have breakfast?  Work   What time did you have a morning snack? No snack   What time did you have lunch? 2-3 PM   Where did you have lunch?  Work   What time did you have an afternoon snack? No snack   What time did you have dinner? 7-8 PM   Where did you have dinner?  Home   What time did you have an evening snack? No Snack   What time of day did you exercise? 7 PM   Where did you exercise? Gym/Fitness Center          Additional concerns:     MEDICATIONS:  Current Outpatient Medications    Medication Sig Dispense Refill     acetaminophen (TYLENOL) 325 MG tablet Take 3 tablets (975 mg) by mouth every 8 hours       alendronate (FOSAMAX) 70 MG tablet Take 1 tablet (70 mg) by mouth every 7 days On Sundays take first thing in the morning with plain water and remain upright for at least 30 minutes and until after first food of the day    Do not restart Fosamax (alendronate) until your difficulty swallowing has resolved and you have finished the entire course of fluconazole (Diflucan). 4 tablet 0     alum & mag hydroxide-simethicone (MYLANTA/MAALOX) 200-200-25 MG CHEW chewable tablet Take 1 tablet by mouth 3 times daily as needed for indigestion       amylase-lipase-protease (CREON 12) 11914 units CPEP Take 6 to 8 capsules by mouth with meals and take 4 capsules with snacks. Needs up to 25 capsules per day 750 capsule 5     cephALEXin (KEFLEX) 500 MG capsule Take 1 capsule (500 mg) by mouth 2 times daily To complete a second course for a 14 day total of antibiotics. 14 capsule 0     Continuous Blood Gluc  (DEXCOM G6 ) RICARDO Use to read blood sugars as per 's instructions. 1 each 0     Continuous Blood Gluc Sensor (DEXCOM G6 SENSOR) MISC Change every 10 days. 9 each 3     Continuous Blood Gluc Transmit (DEXCOM G6 TRANSMITTER) MISC Change every 3 months. 1 each 3     CONTOUR NEXT TEST test strip USE TO TEST BLOOD SUGAR 6 TIMES DAILY OR AS DIRECTED 600 strip 1     cyclobenzaprine (FLEXERIL) 10 MG tablet Take 10 mg by mouth 3 times daily as needed for muscle spasms       diclofenac (FLECTOR) 1.3 % Patch Place 1 patch onto the skin 2 times daily 30 each 1     diclofenac (VOLTAREN) 1 % GEL 2 g Apply 2 g to skin four times a day as needed (to affected upper extremity joint(s)). Maximum 8g/day per joint, 16g/day total.       dicyclomine (BENTYL) 10 MG capsule Take 10 mg by mouth every 6 hours       dronabinol (MARINOL) 2.5 MG capsule Take 2 capsules (5 mg) by mouth 2 times daily as  needed 56 capsule 5     DULoxetine (CYMBALTA) 30 MG capsule Take 1 capsule (30 mg) by mouth daily With 60mg capsule for total dose of 90mg 90 capsule 0     DULoxetine (CYMBALTA) 60 MG EC capsule Take 1 capsule (60 mg) by mouth daily With 30mg capsule for total dose of 90mg 90 capsule 1     famotidine (PEPCID) 20 MG tablet Take 1 tablet (20 mg) by mouth At Bedtime 30 tablet 0     hydrocortisone (CORTAID) 1 % external cream Apply topically 3 times daily 45 g 3     hydrocortisone (CORTEF) 10 MG tablet Take 10 mg in the morning and 10 mg along with a 5 mg tablet at bedtime for a total dose of 15 mg at bedtime. Watch for hypoglycemia recurrence.       hydrocortisone (CORTEF) 5 MG tablet Take 5 mg by mouth At Bedtime along with a 10 mg tablet for a total dose of 15 mg       Injection Device for insulin (NOVOPEN ECHO) RICARDO Use with   Insulin 1 each 0     insulin aspart (NOVOLOG VIAL) 100 UNITS/ML vial Via insulin pump. Approx 60 units daily. 60 mL 1     insulin aspart (NOVOLOG VIAL) 100 UNITS/ML vial USE TO FILL INSULIN PUMP RESERVOIR. NEEDS 150 UNITS EVERY 3 DAYS. CALL [facility] TO SCHEDULE FOLLOW UP APPOINTMENT 10 mL 1     insulin aspart (NOVOPEN ECHO) 100 UNIT/ML cartridge As  instructed per physician 5 mL 0     Insulin Disposable Pump (OMNIPOD 5 G6 INTRO, GEN 5,) KIT 1 each See Admin Instructions Use continuously to infuse insulin. 1 kit 0     Insulin Disposable Pump (OMNIPOD 5 G6 POD, GEN 5,) MISC 1 each See Admin Instructions Change pod every 2-3 days. 35 each 3     levothyroxine (SYNTHROID/LEVOTHROID) 112 MCG tablet Take 1 tablet (112 mcg) by mouth daily 90 tablet 3     linaclotide (LINZESS) 145 MCG capsule Take 145 mcg by mouth every morning (before breakfast) as needed       metoclopramide (REGLAN) 5 MG tablet Take 2 tablets (10 mg) by mouth 3 times daily 180 tablet 3     Nutritional Supplements (BOOST HIGH PROTEIN) LIQD After above baseline labs are drawn, give: 6 mL/kg to maximum of 360 mL; the beverage is to be  consumed within 5 minutes. (Patient taking differently: as needed After above baseline labs are drawn, give: 6 mL/kg to maximum of 360 mL; the beverage is to be consumed within 5 minutes.)  0     ondansetron (ZOFRAN) 4 MG tablet Take 1 tablet (4 mg) by mouth every 8 hours as needed for nausea 30 tablet 0     ondansetron (ZOFRAN-ODT) 4 MG ODT tab DISSOLVE ONE TABLET ON THE TONGUE EVERY 6 HOURS AS NEEDED FOR NAUSEA AND VOMITING 60 tablet 2     order for DME by [employer] [employer] [address] [place], [place]  Ph: [phone]  Fax: [phone]    Nutren 1.5 @ 10 ml/hr with advancement by 10 ml/hr q 8 hours to goal rate of 40 ml/hr.  This will provide 1440 kcals (27 kcal/kg/day), 65 g PRO (1.2 g/kg/day), 730 mL H2O, 169 g CHO and no Fiber daily.    1 Month 3     pantoprazole (PROTONIX) 40 MG EC tablet Take 1 tablet (40 mg) by mouth 2 times daily 180 tablet 3     polyethylene glycol (MIRALAX/GLYCOLAX) packet Take 1 packet by mouth 2 times daily as needed for constipation  14 each 5     potassium chloride ER (KLOR-CON M) 20 MEQ CR tablet Take 1 tablet (20 mEq) by mouth daily 90 tablet 1     senna-docusate (SENOKOT-S/PERICOLACE) 8.6-50 MG tablet Take 1-2 tablets by mouth daily as needed for constipation 60 tablet 3     sodium chloride (OCEAN) 0.65 % nasal spray Spray 1 spray into both nostrils daily as needed for congestion 30 mL 0     sucralfate (CARAFATE) 1 GM tablet Take 1 tablet (1 g) by mouth 4 times daily (before meals and nightly) 30 tablet 0     SUMAtriptan (IMITREX) 50 MG tablet Take 1 tablet (50 mg) by mouth at onset of headache for migraine Take 1 Tab by mouth Once as needed for Migraine Headache. May repeat after two hours.  Maximum dose 200 mg/24 hours. 30 tablet 1     topiramate (TOPAMAX) 100 MG tablet Take 1 tablet (100 mg) by mouth 2 times daily 180 tablet 1     traMADol (ULTRAM) 50 MG tablet Take 0.5-1 tablets (25-50 mg) by mouth every 6 hours as needed for moderate pain 15 tablet 0      traZODone (DESYREL) 100 MG tablet TAKE 1-2 TABLETS BY MOUTH AN HOUR BEFORE BEDTIME 100 tablet 2       Dietary Supplements 12/7/2022   Individual Vitamin Supplements C;D;General multivitamin   Individual Mineral Supplements Calcium with D   Herbal Complimentary products Digestive Enzymes         ALLERGIES:   Allergies   Allergen Reactions     Corticosteroids Other (See Comments)     All oral, IV and injectable steroids are contraindicated (unless in life threatening situations) in Islet Auto transplant recipients. They can cause irreversible loss of islet cell function. Please contact patient's transplant care coordinator YURI Cortez RN at [phone]/pager [phone] and/or endocrinologist prior to administration.       Chocolate Flavor Rash     Breaks out when eats chocolate        .na  LABS:  Last Basic Metabolic Panel:  Lab Results   Component Value Date     09/26/2022     11/12/2020     11/11/2020     11/10/2020      Lab Results   Component Value Date    POTASSIUM 4.3 09/26/2022    POTASSIUM 3.7 11/12/2020    POTASSIUM 3.4 11/11/2020    POTASSIUM 3.5 11/10/2020     Lab Results   Component Value Date    CHLORIDE 92 09/26/2022    CHLORIDE 110 11/12/2020    CHLORIDE 107 11/11/2020    CHLORIDE 108 11/10/2020     Lab Results   Component Value Date    ELIZA 9.8 09/26/2022    ELIZA 8.2 11/12/2020    ELIZA 8.5 11/11/2020    ELIZA 8.6 11/10/2020     Lab Results   Component Value Date    CO2 25 09/26/2022    CO2 27 11/12/2020    CO2 23 11/11/2020    CO2 24 11/10/2020     Lab Results   Component Value Date    BUN 8.6 09/26/2022    BUN 8 11/12/2020    BUN 9 11/11/2020    BUN 6 11/10/2020     Lab Results   Component Value Date    CR 0.59 09/26/2022    CR 0.83 11/12/2020    CR 0.82 11/11/2020    CR 0.78 11/10/2020     Lab Results   Component Value Date     09/26/2022     11/12/2020    GLC 99 11/11/2020     11/10/2020       Last Glucose Profile:   Hemoglobin A1C   Date Value Ref Range  Status   09/26/2022 13.3 (H) <5.7 % Final     Comment:     Normal <5.7%   Prediabetes 5.7-6.4%    Diabetes 6.5% or higher     Note: Adopted from ADA consensus guidelines.   11/09/2020 7.3 (H) 0 - 5.6 % Final     Comment:     Normal <5.7% Prediabetes 5.7-6.4%  Diabetes 6.5% or higher - adopted from ADA   consensus guidelines.     11/21/2019 6.7 (A) 0 - 5.6 % Final   06/11/2019 8.2 (H) 0 - 5.6 % Final     Comment:     Normal <5.7% Prediabetes 5.7-6.4%  Diabetes 6.5% or higher - adopted from ADA   consensus guidelines.         Last Lipid Profile:   Cholesterol   Date Value Ref Range Status   09/26/2022 204 (H) <200 mg/dL Final   07/12/2019 168 <200 mg/dL Final   04/14/2016 236 (H) <200 mg/dL Final     Comment:     Desirable:       <200 mg/dl   01/08/2015 144 <200 mg/dL Final     Comment:     LDL Cholesterol is the primary guide to therapy.   The NCEP recommends further evaluation of: patients with cholesterol greater   than 200 mg/dL if additional risk factors are present, cholesterol greater   than   240 mg/dL, triglycerides greater than 150 mg/dL, or HDL less than 40 mg/dL.       HDL Cholesterol   Date Value Ref Range Status   07/12/2019 83 >49 mg/dL Final   04/14/2016 100 >49 mg/dL Final   01/08/2015 45 (L) >50 mg/dL Final     Direct Measure HDL   Date Value Ref Range Status   09/26/2022 111 >=50 mg/dL Final     LDL Cholesterol Calculated   Date Value Ref Range Status   09/26/2022 79 <=100 mg/dL Final   07/12/2019 71 <100 mg/dL Final     Comment:     Desirable:       <100 mg/dl   04/14/2016 120 (H) <100 mg/dL Final     Comment:     Above desirable:  100-129 mg/dl   Borderline High:  130-159 mg/dL   High:             160-189 mg/dL   Very high:       >189 mg/dl     01/08/2015 85 0 - 129 mg/dL Final     Comment:     LDL Cholesterol is the primary guide to therapy: LDL-cholesterol goal in high   risk patients is <100 mg/dL and in very high risk patients is <70 mg/dL.       Triglycerides   Date Value Ref Range Status    09/26/2022 69 <150 mg/dL Final   07/12/2019 73 <150 mg/dL Final   04/14/2016 80 <150 mg/dL Final   01/08/2015 72 0 - 150 mg/dL Final     Cholesterol/HDL Ratio   Date Value Ref Range Status   01/08/2015 3.2 0.0 - 5.0 Final   08/20/2013 2.9 0.0 - 5.0 Final   01/10/2013 2.7 0.0 - 5.0 Final       Most recent CBC:  Recent Labs   Lab Test 09/26/22  0919 11/13/20  1219 11/12/20  0600 11/11/20  1757 11/11/20  0717   WBC 9.7  --  7.9  --  8.6   HGB 13.9 10.6* 9.5*   < > 9.5*   HCT 40.2  --  29.1*  --  28.0*     --  333  --  288    < > = values in this interval not displayed.     Most recent hepatic panel:  Recent Labs   Lab Test 09/26/22  0919 11/09/20  0741   ALT 78* 50   * 34     Most recent creatinine:  Recent Labs   Lab Test 09/26/22  0919 11/12/20  0600   CR 0.59 0.83       No components found for: GFRESETIMATEDLASTLAB(gfrestblack:1@  Lab Results   Component Value Date    ALBUMIN 4.2 09/26/2022    ALBUMIN 3.2 11/09/2020       Last Thyroid Profile:   TSH   Date Value Ref Range Status   09/26/2022 3.96 0.30 - 4.20 uIU/mL Final   06/12/2019 3.53 0.40 - 4.00 mU/L Final   08/28/2018 2.21 0.40 - 4.00 mU/L Final   11/16/2016 0.15 (L) 0.40 - 4.00 mU/L Final     T4 Free   Date Value Ref Range Status   06/12/2019 1.04 0.76 - 1.46 ng/dL Final   08/28/2018 0.77 0.76 - 1.46 ng/dL Final   03/30/2016 1.10 0.76 - 1.46 ng/dL Final       Last Mineral Profile:   Ferritin   Date Value Ref Range Status   11/09/2020 28 8 - 252 ng/mL Final     Iron   Date Value Ref Range Status   11/21/2019 58 35 - 180 ug/dL Final     Iron Binding Cap   Date Value Ref Range Status   11/21/2019 410 240 - 430 ug/dL Final       Autoimmune & Inflammatory   CRP Inflammation   Date Value Ref Range Status   11/12/2020 3.8 0.0 - 8.0 mg/L Final         Last Vitamin Profile:   25 OH Vit D2   Date Value Ref Range Status   04/18/2019 <5 ug/L Final   02/01/2018 <5 ug/L Final   01/19/2017 <5 ug/L Final     25 OH Vit D3   Date Value Ref Range Status    04/18/2019 33 ug/L Final   02/01/2018 41 ug/L Final   01/19/2017 38 ug/L Final     25 OH Vit D total   Date Value Ref Range Status   04/18/2019 <38 20 - 75 ug/L Final     Comment:     Season, race, dietary intake, and treatment affect the concentration of   25-hydroxy-Vitamin D. Values may decrease during winter months and increase   during summer months. Values 20-29 ug/L may indicate Vitamin D insufficiency   and values <20 ug/L may indicate Vitamin D deficiency.  This test was developed and its performance characteristics determined by the   [facility],  Special Chemistry Laboratory. It has   not been cleared or approved by the FDA. The laboratory is regulated under   CLIA as qualified to perform high-complexity testing. This test is used for   clinical purposes. It should not be regarded as investigational or for   research.     02/01/2018 <46 20 - 75 ug/L Final     Comment:     Season, race, dietary intake, and treatment affect the concentration of   25-hydroxy-Vitamin D. Values may decrease during winter months and increase   during summer months. Values 20-29 ug/L may indicate Vitamin D insufficiency   and values <20 ug/L may indicate Vitamin D deficiency.  This test was developed and its performance characteristics determined by the   [facility],  Special Chemistry Laboratory. It has   not been cleared or approved by the FDA. The laboratory is regulated under   CLIA as qualified to perform high-complexity testing. This test is used for   clinical purposes. It should not be regarded as investigational or for   research.     01/19/2017  20 - 75 ug/L Final    <43  Season, race, dietary intake, and treatment affect the concentration of   25-hydroxy-Vitamin D. Values may decrease during winter months and increase   during summer months. Values 20-29 ug/L may indicate Vitamin D insufficiency   and values <20 ug/L may indicate Vitamin D deficiency.   This  "test was developed and its performance characteristics determined by the   [facility],  Special Chemistry Laboratory. It has   not been cleared or approved by the FDA. The laboratory is regulated under CLIA   as qualified to perform high-complexity testing. This test is used for clinical   purposes. It should not be regarded as investigational or for research.         ANTHROPOMETRICS:  Vitals:   BP Readings from Last 1 Encounters:   11/04/22 127/78     Pulse Readings from Last 1 Encounters:   11/04/22 72     Estimated body mass index is 23 kg/m  as calculated from the following:    Height as of 11/4/22: 1.575 m (5' 2.01\").    Weight as of 11/4/22: 57.1 kg (125 lb 12.8 oz).    Wt Readings from Last 5 Encounters:   11/04/22 57.1 kg (125 lb 12.8 oz)   09/26/22 52.6 kg (116 lb)   04/01/21 66.9 kg (147 lb 8 oz)   11/08/20 67.5 kg (148 lb 13 oz)   11/21/19 67.2 kg (148 lb 3.2 oz)       NUTRITION DIAGNOSIS:    1. Excessive intake of carbs related to food and nutrition knowledge deficit regarding effect of sugars/FODMAPs/gluten on liver and endocrine system as evidenced by elevated labs and symptoms of constipation, abdominal bloating, gas nausea, GERD.    2. Inadequate fiber intake related to food and nutrition knowledge deficit regarding desirable quantities of fiber, as evidenced by GI issues and elevated A1c, ALT and AST.     3. Altered GI function related to allergy/food sensitivities or intolerances from unknown protein substances or sugars (FODMAPs) as evidenced by constipation, nausea, gas. bloating, GERD,  and abdominal pain  when consuming normal diet and not adhering to pancreatic supplements.       NUTRITION INTERVENTION:    Long Term Goals:   Goal: 1-2 bowl movement daily [place] Stool Type 4 soft and formed  Goal: Decrease digestive symptoms -constipation, gas, bloating, heartburn and stomach aches  Goal: Decrease A1c levels   Goal: Decrease ALT and AST     Short Term Goals:  Goal " "1: Focus on starting with soups, purred foods and smoothies that are gluten and dairy free. Monitor how well you can tolerate these solid foods and stick to liquid or purred breakfast ideas as needed.     Ex smoothie for breakfast - see the recipe provided in handouts and book below under resources - add in unsweetened flax, hemp or coconut milk, 1 tbsp of healthy fat such as flax seed ground or shae seed ground. serving of fruit 1 cup of berries. Veggie (optional), 1 scoop of plant based protein powder or collagen powder, 1 serving of lowfodmap fruit and veggie such as kale, spinach      Also, check out some of the other breakfast ideas provided such as oatmeal (naturally gluten free) with dairy free alternative milk, flax seed, nuts such as walnuts, fruit such as berries and protein like eggs or chicken/turkey sausage.     Avocado on slice of gluten free bread recommend base culture brand if able to start consuming some solid foods.      Also, check out some of the other breakfast ideas provided such as paleo bread with avocado, sesame seeds and honey (as tolerated) or chicken/turkey sausage with veggies on a paleo tortilla such as siete brand.     Look into the different bread varieties of bread from the brands \"base culture\" or \"unbun\" to see if they can be tolerated. They seem to have some eggs so it would be good to trial how well you tolerated if cooked in small amounts is ok.      Birch alexandra brand for pancakes - paleo  https://FanXchange/collections/pancake-waffle-mix/products/paleo     Try siete soft taco shells with eggs and veggies in am for breakfast   https://Culture Kitchen/collections/tortillas/products/kffjyx-umyvi-xyedngkjq-6-pack         Goal 2: Adopt a lower-carb diet- High blood sugar leads to fatty liver and elevated liver enzymes. To start reversing it you need to bring blood sugar levels down to healthy levels with a whole foods diet abundant in fibrous vegetables, healthy fats and " proteins while low in foods that spike blood sugars.     Try some gluten free and dairy free alternatives to see if they help decrease symptoms of inflammation.     Make sure to eat consistent snacks with protein and healthy fats such as hummus or guac with veggies (as tolerated -ex cucumber without skin) or with some gluten paleo crackers (few brands to experiment with - simple live, hu kichen sea salt grain free crackers, [employer] Bak[employer] Paleo Crackers)       Fruit 1 sevrving such as banana or apple with nut/seed butter such as cashew, tahini, and or sunflower seed butter. Monitor if sprouted nuts/seeds can be tolerated. Monitor if a paleo granola is well tolerated.     Try to cut back on the gluten free processed grains such as rice/corn as they might be triggering symptoms.     Eating every three to four hours will help keep your insulin and blood sugars balanced.   - see meal plan and recipe ideas in the cardi metabolic guides provided.     Some Examples:   Beef Jerky with veggie or fruit   https://Notice Technologiese.net/recipe/3-ingredient-shae-pudding/     [place] Chips with hummus or cheese      Siete Chips with Guac and Salsa      Hummus with veggie      Fruit with nut butter - banana with sunflower seed butter or apple with almond butter         Goal 3:  Track what you eat. Writing down or tracking through an krista what you eat as well as how you feel and help you identify patterns in your symptoms. This can help you become more aware and create a diet that is right for you.    Pick a food tracking krista:          Through the [employer] krista, you can track symptoms, bowel movements, medications, stress, exercise, sleep and foods as well as beverages to become more mindful. Https://Multiply/[email or web address]    **track by paper if krista feels like too much. Make list of foods that cause symptoms to provide and review at follow up. I recommend the dailygreatness journal as you can track your goals, mindset, foods etc to  keep you on track and motivated.     Goal 4: Start the following probiotic by Carina Gusman MD - 100 billion lactic acid based with 14 strains once day before bed. Monitor for symptoms and if increase reach out to dietitian.      https://QFPay.[employer]/products/probiotic-capsules-100-billion?utm_source=[employer]&utm_medium=cpc&utm_campaign=6131348327&ut_adgroup=[id number]&utm_term=&gclid=[id number]    Recolonization with healthy, beneficial bacteria (probiotics) and reintroduce prebiotics from foods/supplements as tolerated that will help rebalance the microbiome.    Rebuilding the friendly bacteria in your microbime. Start by taking a probiotic supplement, they will help rebuild the healthy bacteria so essential to good gut health. Recolonization your digestive tract with healthy, beneficial good bacteria (probiotics). You can do that by taking very high-potency probiotics (look for at least 25 billion live CFU s from diversified strains of Lactobacillus, Bifidobacterium, and Saccharomyces boulardii), taken twice a day for one to two months.  Start slowly and observe how the probiotics affect your gut.  In some cases, certain individuals may need to delay probiotics until their gut is more intact.    Eat fermented foods. Include plenty of probiotic-rich foods like kimchi, kombucha, miso, or sauerkraut. Sometimes, you can also eat yogurt if you are not allergic to dairy. Try unsweetened sheep or goat yogurt. These are all foods that help your gut bebeto get and stay healthy.          Probiotics: Consume foods rich in beneficial bacteria such as coconut kefir, coconut yogurt, sauerkraut, kimchi, kombucha or K[employer]ita.            Prebiotics: Incorporate carbohydrates that bacteria love to eat such as inulin from chicory, onions, garlic, leeks, artichokes, dandelion leaves, simba gum, asparagus, apples, shae/flax seeds, psyllium, beans and  resistant starches (seeds, cashews, legumes, plantains, green bananas and potatoes that are cooked and cooled.    **avoid any foods that cause adverse reactions and not tolerated when you reintroduce. Introduce slowly small amounts of gas and bloating is normal and should pass.      Choose foods high in fiber: Aim for at least 5 grams of fiber per serving of food or a total of 25-35 grams fiber per day. Remember, when looking at the label, you can take the fiber away from the total carbs. Ex:15g of total carbs - 4g of fiber = 11g net carbs    Insoluble fiber acts like a bulky  inner broom,  sweeping out debris from the intestine and creating more motility and movement.     Soluble fiber attracts water and swells, creating a gel that slows digestion.  Also, slows the release of glucose from foods into the blood which stops spikes in blood sugar levels.  Soluble fiber traps toxins in the gut, helping to carry them to excretion and provides healthy bacteria in the digestive tract.    Rebuild your friendly bacteria in your microbime. Start taking probiotic supplements. They will help rebuild the healthy bacteria so essential to good gut health.        Smoothie Recipes to Kickoff Your Success!!     Pineapple Coconut Smoothie      Makes 1-2 serving     1-2 cup unsweetened coconut milk   1 scoop manrique vanilla plant based protein powder or collagen protein powder by vital proteins (great for just having no flavor - plain)   1 cup frozen pineapple chunks   1 handful spinach (optional)     banana (optional - if added use less pineapple so not as high of sugar content)   1 tbsp shredded unsweetend coconut to top (optional)  1 tbsp ground flax seed (optional)     Method:     In  (recommend vitamix or blend tech) add dairy free alternative milk or filtered water. Add in the rest of ingredients blend on high for 2 mins. If desire thinner consistency add in the water or dairy alternative. Enjoy :)    Kijennie Knappulina Smoothie       Makes 1 servings     1 cup unsweetened macadamia nut milk or filtered water   1 scoop manrique vanilla plant based protein powder or 1-2 scoops collagen powder from vital proteins (plain)   1-2 Kiwi s peeled   1 handful cilantro   1 lime juiced    - 1 avocado   2 tsp spirulina   1 inch eric fresh pilled      Method:     In  (recommend vitamix or blend tech) add dairy free alternative milk or filtered water. Add in the rest of ingredients blend on high for 2 mins. If desire thinner consistency add in the water or dairy alternative. Enjoy :)     Green Detox Smoothie      Makes 1 servings     1 cup unsweetened macadamia nut milk or filtered water (I like filtered water more for this recipe)  1-2 scoops collagen powder from vital proteins (plain)   1 small granny srivastava apple (prefer organic - lower pesticides and endocrine disruptors)   1 handful cilantro   1 lime juiced    - 1 avocado (optional)   1 cucumber      Method:     In  (recommend vitamix or blend tech) add dairy free alternative milk or filtered water. Add in the rest of ingredients blend on high for 2 mins. If desire thinner consistency add in the water or dairy alternative. Enjoy :)        Spirulina Banana Smoothie      Makes 1 serving     1-2 cup unsweetened macadamia nut milk or filtered water (I like filtered water more for this recipe)  1 scoop manrique vanilla plant based protein powder     banana   1 handful spinach    - 1 avocado  1-2 tsp spirulina    1 tbsp ground flax seed      Method:     In  (recommend vitamix or blend tech) add dairy free alternative milk or filtered water. Add in the rest of ingredients blend on high for 2 mins. If desire thinner consistency add in the water or dairy alternative. Enjoy :)    Banana Date Split Smoothie      Makes 1-2 servings     1-2 cup unsweetened almond/macadamia nut milk or filtered water (I like filtered water more for this recipe)  1 scoop manrique chocolate plant based protein powder or  collagen protein powder     -1  banana   1 handful spinach    - 1 avocado  1 -3 tsp cinnamon   1 tbsp shae seeds   1-2 dates (optional)        Method:     In  (recommend vitamix or blend tech) add dairy free alternative milk or filtered water. Add in the rest of ingredients blend on high for 2 mins. If desire thinner consistency add in the water or dairy alternative. Enjoy :)     Berry Blast Smoothie      Makes 1 serving     1-2 cup unsweetened macadamia nut milk or filtered water (I like filtered water more for this recipe)  1 scoop manrique vanilla plant based protein powder or collagen protein powder by vital proteins (plain)   1 cup mixed berries    1 handful spinach    - 1 avocado  1-2 tsp raw organic maqui berry powder by Sunfood superfoods (optional but very immune boosting and jammed with phytonutrients)   1 tbsp ground flax seed      Method:     In  (recommend vitamix or blend tech) add dairy free alternative milk or filtered water. Add in the rest of ingredients blend on high for 2 mins. If desire thinner consistency add in the water or dairy alternative. Enjoy :)     Superfood + Immune Boosting Powders:  Maqui Berry Powder   Turmeric Powder   Spirulina Powder   Glutamine Powder   Matcha Powder   Ligia fresh or powdered   Kale, dandelion greens or spinach         Omega 3 and Protein Desired Toppings:   Add some healthy protein and omega 3 packed seeds for an extra special nutrient packed topping and a little extra crunch!   1 tsp -1 tbps ground flax seed   1 tsp -1 tbsp hemp seeds               1 tsp-1 tbsp shae seeds         Eat real anti-inflammatory foods. When you eat whole, real foods in their unprocessed forms, you take the first step to healing our gut and overall health. Eat plenty of vegetables, fruits, non-gluten whole grains (monitor for symptoms), beans, nuts, seeds, lean meats, healthy fats and other plant foods.        Food plan - 1,400-1,800calories per day          Protein 9-10  servings per day - include at each meal to stabilize blood sugars  (Choose 3oz or 21g per meal and aim for 1oz of 7g for snacks)  -       Strive for 1-2 servings of fish per week especially of higher omega-3 fatty acid containing fish such as salmon.          Legumes 1-2 serving per day (monitor for digestive issues try hummus first)          Dairy alternatives 1-2 serving per day          nuts and seeds 3-4 servings per day - great to incorporate as snacks - nut butters/seed butters and or powders and or sprouted nuts/seeds may be easier to digest and experiment with          Fats and oils 4 servings per day          Non starchy vegetables 7-8 servings per day          Starchy vegetable limit 1 serving per day as they tend to impact blood sugar (they are moderate-GI).          Fruits 2 servings per day - best to couple with a little bit of protein or fat to offset a rise in blood sugars (they are low-moderate-GI foods and monitor if high fodmap are harder to tolerate or trigger symptoms)          Whole grains 1-2 serving per day - try gluten free whole grains instead (focus on cutting back on wheat to see if this higher fodmap food triggers symptoms)       Incorporate protein powder daily:          Plant based hemp (recommended brands: [place], Nutiva, Just Hemp Protein, [employer])           Plant based pea (recommended brands: Naked Pea, Now Sports). If you want to try a combo of pea and hemp the brand Grier in vanilla or chocolate is a great option.          Try Bone broth protein powder or collagen peptides in liquid bone broth, vegetable broth or 12 oz of water as snack. The bone broth powder and collagen can be used for soups as well. This can help provide essential amino acids and minerals that heal your gut as well as balance blood sugars. A great option if you have a hard time tolerating solids.    Can also consider pre made shakes if unable to make smoothies.      Brand examples that are  gluten and dairy free to try:   1) Orgain Vegan Protein Shakes, 20g of Plant Based Protein, Creamy Chocolate - Gluten Free, No Dairy, Soy, or Preservatives, No Added Sugar  2) Pirq, Vegan Protein Shake, Turmeric Curcumin, Melia, Plant-Based Protein Drink, Gluten-Free, Dairy-Free, Soy-Free, Non-GMO, Vegetarian, Kosher, Keto, Low Carb, Low Calorie  3) OWYN Pro Elite Vegan Plant-Based High Protein Shake, 35g Protein, 9 Amino Acids, Omega-3, Prebiotics, Superfoods Greens for Workout and Recovery, 0g Net Carbs, Zero Sugar, Keto  4) Ripple Vegan Protein Shake  Chocolate  20g Nutritious Plant Based Pea Protein  Shelf Stable  No GMOs, Soy, Nut, Gluten, Lactose  5) [employer] Glucose Support 1.2 Plant based, dairy free, low glycemic index  https://[employer]/products/glucose-support-1-2-vanilla        NUTRITION RESOURCES:         The Gut-Friendly Cookbook: Delicious Low-FODMAP, Gluten-Free, Allergy-Friendly Recipes for a Happy Tummy Paperback by Geni Tay    The Low-FODMAP Diet for Beginners: A 7-Day Plan to Beat Bloat and Soothe Your Gut with Recipes for Fast IBS Relief    Puree Recipes That are Loaded with Flavor: Puree Recipes for The Whole Family Paperback 21 Day Low    FODMAP Smoothie Challenge (21 Day Low FODMAP Challenges Book 1) Angle Edition by Carline Craig    Low FODMAP Smoothies Recipe Book: A Beginners Guide to Low FODMAP Smoothies for Gut Health    IFM gluten free guide and microbiome guides       PATIENT'S BEHAVIOR CHANGE GOALS:   See nutrition intervention for patient stated behavior change goals. AVS was printed and given to patient at today's appointment.    MONITOR / EVALUATE:  Registered Dietitian will monitor/evaluate the following:     Beliefs and attitudes related to food    Food and nutrition knowledge / skills    Food / Beverage / Nutrient intake     Pertinent Labs    Progress toward meeting stated nutrition-related goals    Readiness to change nutrition-related behaviors    Weight  change    Digestion     COORDINATION OF CARE:  Follow up with primary care provider   In future if symptoms don't get improve recommend nutrient testing, celiac testing and possibly h Pylori would be beneficial.     FOLLOW-UP:  Follow-up appointment scheduled on Dec  21st at 11am and Jan 11th at 11am video visit    Time spent in minutes: 75 minutes 5 units   Encounter: Individual    Kaitlyn Martinez RD, CLT, LD  Integrative Registered Dietitian

## 2022-12-09 NOTE — PATIENT INSTRUCTIONS
"Long Term Goals:   Goal: 1-2 bowl movement daily [place] Stool Type 4 soft and formed  Goal: Decrease digestive symptoms -constipation, gas, bloating, heartburn and stomach aches  Goal: Decrease A1c levels   Goal: Decrease ALT and AST     Short Term Goals:  Goal 1: Focus on starting with soups, purred foods and smoothies that are gluten and dairy free. Monitor how well you can tolerate these solid foods and stick to liquid or purred breakfast ideas as needed.     Ex smoothie for breakfast - see the recipe provided in handouts and book below under resources - add in unsweetened flax, hemp or coconut milk, 1 tbsp of healthy fat such as flax seed ground or shae seed ground. serving of fruit 1 cup of berries. Veggie (optional), 1 scoop of plant based protein powder or collagen powder, 1 serving of lowfodmap fruit and veggie such as kale, spinach      Also, check out some of the other breakfast ideas provided such as oatmeal (naturally gluten free) with dairy free alternative milk, flax seed, nuts such as walnuts, fruit such as berries and protein like eggs or chicken/turkey sausage.     Avocado on slice of gluten free bread recommend base culture brand if able to start consuming some solid foods.      Also, check out some of the other breakfast ideas provided such as paleo bread with avocado, sesame seeds and honey (as tolerated) or chicken/turkey sausage with veggies on a paleo tortilla such as siete brand.     Look into the different bread varieties of bread from the brands \"base culture\" or \"unbun\" to see if they can be tolerated. They seem to have some eggs so it would be good to trial how well you tolerated if cooked in small amounts is ok.      Birch alexandra brand for pancakes - paleo  https://Vent[employer]/collections/pancake-waffle-mix/products/paleo     Try siete soft taco shells with eggs and veggies in am for breakfast "   https://[employer].Brickfish/collections/tortillas/products/ettvee-dehzb-woodlhrku-6-pack         Goal 2: Adopt a lower-carb diet- High blood sugar leads to fatty liver and elevated liver enzymes. To start reversing it you need to bring blood sugar levels down to healthy levels with a whole foods diet abundant in fibrous vegetables, healthy fats and proteins while low in foods that spike blood sugars.     Try some gluten free and dairy free alternatives to see if they help decrease symptoms of inflammation.     Make sure to eat consistent snacks with protein and healthy fats such as hummus or guac with veggies (as tolerated -ex cucumber without skin) or with some gluten paleo crackers (few brands to experiment with - simple live, hu kichen sea salt grain free crackers, Jose L Bakery Paleo Crackers)       Fruit 1 sevrving such as banana or apple with nut/seed butter such as cashew, tahini, and or sunflower seed butter. Monitor if sprouted nuts/seeds can be tolerated. Monitor if a paleo granola is well tolerated.     Try to cut back on the gluten free processed grains such as rice/corn as they might be triggering symptoms.     Eating every three to four hours will help keep your insulin and blood sugars balanced.   - see meal plan and recipe ideas in the cardi metabolic guides provided.     Some Examples:   Beef Jerky with veggie or fruit   https://VI Systemsgoodfoodie.net/recipe/3-ingredient-shae-pudding/     Simple Mills Chips with hummus or cheese      Siete Chips with Guac and Salsa      Hummus with veggie      Fruit with nut butter - banana with sunflower seed butter or apple with almond butter         Goal 3:  Track what you eat. Writing down or tracking through an krista what you eat as well as how you feel and help you identify patterns in your symptoms. This can help you become more aware and create a diet that is right for you.    Pick a food tracking krista:         Through the [employer] krista, you can track symptoms,  bowel movements, medications, stress, exercise, sleep and foods as well as beverages to become more mindful. Https://[employer]/wp/.    **track by paper if krista feels like too much. Make list of foods that cause symptoms to provide and review at follow up. I recommend the dailygreatness journal as you can track your goals, mindset, foods etc to keep you on track and motivated.     Goal 4: Start the following probiotic by Carina Gusman MD - 100 billion lactic acid based with 14 strains once day before bed. Monitor for symptoms and if increase reach out to dietitian.      https://[employer].BlogBus/products/probiotic-capsules-100-billion?utm_source=Grafoid&utm_medium=cpc&utm_campaign=[email or web address]&utm_adgroup=[email or web address]&utm_term=&gclid=CjwKCAiAsNKQBhAPEiwAB-[id number]    Recolonization with healthy, beneficial bacteria (probiotics) and reintroduce prebiotics from foods/supplements as tolerated that will help rebalance the microbiome.    Rebuilding the friendly bacteria in your microbime. Start by taking a probiotic supplement, they will help rebuild the healthy bacteria so essential to good gut health. Recolonization your digestive tract with healthy, beneficial good bacteria (probiotics). You can do that by taking very high-potency probiotics (look for at least 25 billion live CFU s from diversified strains of Lactobacillus, Bifidobacterium, and Saccharomyces boulardii), taken twice a day for one to two months.  Start slowly and observe how the probiotics affect your gut.  In some cases, certain individuals may need to delay probiotics until their gut is more intact.    Eat fermented foods. Include plenty of probiotic-rich foods like kimchi, kombucha, miso, or sauerkraut. Sometimes, you can also eat yogurt if you are not allergic to dairy. Try unsweetened sheep or goat yogurt. These are all foods that help your gut bebeto get and stay healthy.          Probiotics: Consume foods rich in beneficial bacteria such as coconut kefir, coconut yogurt, sauerkraut, kimchi, kombucha or Kevita.           Prebiotics: Incorporate carbohydrates that bacteria love to eat such as inulin from chicory, onions, garlic, leeks, artichokes, dandelion leaves, simba gum, asparagus, apples, shae/flax seeds, psyllium, beans and resistant starches (seeds, cashews, legumes, plantains, green bananas and potatoes that are cooked and cooled.    **avoid any foods that cause adverse reactions and not tolerated when you reintroduce. Introduce slowly small amounts of gas and bloating is normal and should pass.      Choose foods high in fiber: Aim for at least 5 grams of fiber per serving of food or a total of 25-35 grams fiber per day. Remember, when looking at the label, you can take the fiber away from the total carbs. Ex:15g of total carbs - 4g of fiber = 11g net carbs    Insoluble fiber acts like a bulky  inner broom,  sweeping out debris from the intestine and creating more motility and movement.     Soluble fiber attracts water and swells, creating a gel that slows digestion.  Also, slows the release of glucose from foods into the blood which stops spikes in blood sugar levels.  Soluble fiber traps toxins in the gut, helping to carry them to excretion and provides healthy bacteria in the digestive tract.    Rebuild your friendly bacteria in your microbime. Start taking probiotic supplements. They will help rebuild the healthy bacteria so essential to good gut health.        Smoothie Recipes to Kickoff Your Success!!     Pineapple Coconut Smoothie      Makes 1-2 serving     1-2 cup unsweetened coconut milk   1 scoop manrique vanilla plant based protein powder or collagen protein powder by vital proteins (great for just having no flavor - plain)   1 cup frozen pineapple chunks   1 handful spinach (optional)     banana (optional - if added use less pineapple so not as high of sugar content)   1 tbsp  shredded unsweetend coconut to top (optional)  1 tbsp ground flax seed (optional)     Method:     In  (recommend vitamix or blend tech) add dairy free alternative milk or filtered water. Add in the rest of ingredients blend on high for 2 mins. If desire thinner consistency add in the water or dairy alternative. Enjoy :)    Kiwi Spirulina Smoothie      Makes 1 servings     1 cup unsweetened macadamia nut milk or filtered water   1 scoop manrique vanilla plant based protein powder or 1-2 scoops collagen powder from vital proteins (plain)   1-2 Kiwi s peeled   1 handful cilantro   1 lime juiced    - 1 avocado   2 tsp spirulina   1 inch eric fresh pilled      Method:     In  (recommend vitamix or blend tech) add dairy free alternative milk or filtered water. Add in the rest of ingredients blend on high for 2 mins. If desire thinner consistency add in the water or dairy alternative. Enjoy :)     Green Detox Smoothie      Makes 1 servings     1 cup unsweetened macadamia nut milk or filtered water (I like filtered water more for this recipe)  1-2 scoops collagen powder from vital proteins (plain)   1 small granny srivastava apple (prefer organic - lower pesticides and endocrine disruptors)   1 handful cilantro   1 lime juiced    - 1 avocado (optional)   1 cucumber      Method:     In  (recommend vitamix or blend tech) add dairy free alternative milk or filtered water. Add in the rest of ingredients blend on high for 2 mins. If desire thinner consistency add in the water or dairy alternative. Enjoy :)        Spirulina Banana Smoothie      Makes 1 serving     1-2 cup unsweetened macadamia nut milk or filtered water (I like filtered water more for this recipe)  1 scoop manrique vanilla plant based protein powder     banana   1 handful spinach    - 1 avocado  1-2 tsp spirulina    1 tbsp ground flax seed      Method:     In  (recommend vitamix or blend tech) add dairy free alternative milk or filtered water.  Add in the rest of ingredients blend on high for 2 mins. If desire thinner consistency add in the water or dairy alternative. Enjoy :)    Banana Date Split Smoothie      Makes 1-2 servings     1-2 cup unsweetened almond/macadamia nut milk or filtered water (I like filtered water more for this recipe)  1 scoop manrique chocolate plant based protein powder or collagen protein powder     -1  banana   1 handful spinach    - 1 avocado  1 -3 tsp cinnamon   1 tbsp shae seeds   1-2 dates (optional)        Method:     In  (recommend vitamix or blend tech) add dairy free alternative milk or filtered water. Add in the rest of ingredients blend on high for 2 mins. If desire thinner consistency add in the water or dairy alternative. Enjoy :)     Berry Blast Smoothie      Makes 1 serving     1-2 cup unsweetened macadamia nut milk or filtered water (I like filtered water more for this recipe)  1 scoop manrique vanilla plant based protein powder or collagen protein powder by vital proteins (plain)   1 cup mixed berries    1 handful spinach    - 1 avocado  1-2 tsp raw organic maqui berry powder by Sunfood superfoods (optional but very immune boosting and jammed with phytonutrients)   1 tbsp ground flax seed      Method:     In  (recommend vitamix or blend tech) add dairy free alternative milk or filtered water. Add in the rest of ingredients blend on high for 2 mins. If desire thinner consistency add in the water or dairy alternative. Enjoy :)     Superfood + Immune Boosting Powders:  Maqui Berry Powder   Turmeric Powder   Spirulina Powder   Glutamine Powder   Matcha Powder   Ligia fresh or powdered   Kale, dandelion greens or spinach         Omega 3 and Protein Desired Toppings:   Add some healthy protein and omega 3 packed seeds for an extra special nutrient packed topping and a little extra crunch!   1 tsp -1 tbps ground flax seed   1 tsp -1 tbsp hemp seeds               1 tsp-1 tbsp shae seeds         Eat real  anti-inflammatory foods. When you eat whole, real foods in their unprocessed forms, you take the first step to healing our gut and overall health. Eat plenty of vegetables, fruits, non-gluten whole grains (monitor for symptoms), beans, nuts, seeds, lean meats, healthy fats and other plant foods.        Food plan - 1,400-1,800calories per day          Protein 9-10 servings per day - include at each meal to stabilize blood sugars  (Choose 3oz or 21g per meal and aim for 1oz of 7g for snacks)  -       Strive for 1-2 servings of fish per week especially of higher omega-3 fatty acid containing fish such as salmon.          Legumes 1-2 serving per day (monitor for digestive issues try hummus first)          Dairy alternatives 1-2 serving per day          nuts and seeds 3-4 servings per day - great to incorporate as snacks - nut butters/seed butters and or powders and or sprouted nuts/seeds may be easier to digest and experiment with          Fats and oils 4 servings per day          Non starchy vegetables 7-8 servings per day          Starchy vegetable limit 1 serving per day as they tend to impact blood sugar (they are moderate-GI).          Fruits 2 servings per day - best to couple with a little bit of protein or fat to offset a rise in blood sugars (they are low-moderate-GI foods and monitor if high fodmap are harder to tolerate or trigger symptoms)          Whole grains 1-2 serving per day - try gluten free whole grains instead (focus on cutting back on wheat to see if this higher fodmap food triggers symptoms)       Incorporate protein powder daily:         Plant based hemp (recommended brands: [place], Nutiva, Just Hemp Protein, [employer])          Plant based pea (recommended brands: Naked Pea, Now Sports). If you want to try a combo of pea and hemp the brand Grier in vanilla or chocolate is a great option.         Try Bone broth protein powder or collagen peptides in liquid bone broth, vegetable  broth or 12 oz of water as snack. The bone broth powder and collagen can be used for soups as well. This can help provide essential amino acids and minerals that heal your gut as well as balance blood sugars. A great option if you have a hard time tolerating solids.    Can also consider pre made shakes if unable to make smoothies.      Brand examples that are gluten and dairy free to try:   1) Orgain Vegan Protein Shakes, 20g of Plant Based Protein, Creamy Chocolate - Gluten Free, No Dairy, Soy, or Preservatives, No Added Sugar  2) Pirq, Vegan Protein Shake, Turmeric Curcumin, Melia, Plant-Based Protein Drink, Gluten-Free, Dairy-Free, Soy-Free, Non-GMO, Vegetarian, Kosher, Keto, Low Carb, Low Calorie  3) OWYN Pro Elite Vegan Plant-Based High Protein Shake, 35g Protein, 9 Amino Acids, Omega-3, Prebiotics, Superfoods Greens for Workout and Recovery, 0g Net Carbs, Zero Sugar, Keto  4) Ripple Vegan Protein Shake  Chocolate  20g Nutritious Plant Based Pea Protein  Shelf Stable  No GMOs, Soy, Nut, Gluten, Lactose  5) Naldo Glucose Support 1.2 Plant based, dairy free, low glycemic index  https://Bramasol.Evozym [employer]ologics/products/glucose-support-1-2-vanilla        NUTRITION RESOURCES:        The Gut-Friendly Cookbook: Delicious Low-FODMAP, Gluten-Free, Allergy-Friendly Recipes for a Happy Tummy Paperback by Geni Tay  The Low-FODMAP Diet for Beginners: A 7-Day Plan to Beat Bloat and Soothe Your Gut with Recipes for Fast IBS Relief  Puree Recipes That are Loaded with Flavor: Puree Recipes for The Whole Family Paperback 21 Day Low  FODMAP Smoothie Challenge (21 Day Low FODMAP Challenges Book 1) Angle Edition by Carline Craig  Low FODMAP Smoothies Recipe Book: A Beginners Guide to Low FODMAP Smoothies for Gut Health  IFM gluten free and microbiome diet handout

## 2022-12-16 ENCOUNTER — TELEPHONE (OUTPATIENT)
Dept: NUTRITION | Facility: CLINIC | Age: 57
End: 2022-12-16

## 2022-12-16 NOTE — TELEPHONE ENCOUNTER
Called patient twice this week to make sure she received the resources needed from our initial consult sent on Friday Dec 9th via [employer].     Left voicemial with patient letting her know the AVS with nutrition resources are attached to [employer] message as the nutrition note has not been read.     Followed up with patient again today but voicemail was full and unable to receive messages on mobile. [phone] Called patients alternative number [phone] and patient answered.     Encouraged patient to review so she has adequate time to implement over the next week before our upcoming appt. Patient acknowledged she would review.      If patient is unable to tolerate these foods, we will need to refer onto a dietitian who specializes in tube feedings.       Kaitlyn Martinez RD, LD, CLT  Registered Dietitian Nutritionist

## 2022-12-21 ENCOUNTER — VIRTUAL VISIT (OUTPATIENT)
Dept: NUTRITION | Facility: CLINIC | Age: 57
End: 2022-12-21
Payer: COMMERCIAL

## 2022-12-21 DIAGNOSIS — Z71.3 DIETARY COUNSELING AND SURVEILLANCE: Primary | ICD-10-CM

## 2022-12-21 PROCEDURE — 99207 PR NO CHARGE LOS: CPT | Performed by: DIETITIAN, REGISTERED

## 2022-12-27 NOTE — PROGRESS NOTES
Medical Nutrition Therapy Consult No Show    At time of the appointment, I waited on video visit for patient and sent video visit link to email and text. Also attempted to reach patient by phone but received voicemail. I left message regarding appt time and next steps for nutrition recemmendations and if patient would like to follow up to call [phone].  If patient is still struggling with tolerating the foods recommended at initial consult, I would refer for tube feeding and encourage her to follow up with the previous dietitian that she worked with who helped her assess for past tube feeding. At our initial consult, patient was requesting tube feeding and encouraged some more purred foods to see if they would help with her digestion.  I left the name of dietitian patient previously worked with on voicemail and also created personalized message in [employer].      Kaitlyn Martinez, RD, CLT, LD  Integrative Registered Dietitian

## 2023-01-07 ENCOUNTER — HEALTH MAINTENANCE LETTER (OUTPATIENT)
Age: 58
End: 2023-01-07

## 2023-01-11 ENCOUNTER — VIRTUAL VISIT (OUTPATIENT)
Dept: NUTRITION | Facility: CLINIC | Age: 58
End: 2023-01-11
Payer: COMMERCIAL

## 2023-01-11 DIAGNOSIS — E13.9 POST-PANCREATECTOMY DIABETES (H): ICD-10-CM

## 2023-01-11 DIAGNOSIS — E10.649 TYPE 1 DIABETES MELLITUS WITH HYPOGLYCEMIA AND WITHOUT COMA (H): Primary | ICD-10-CM

## 2023-01-11 DIAGNOSIS — Z90.410 POST-PANCREATECTOMY DIABETES (H): ICD-10-CM

## 2023-01-11 DIAGNOSIS — Z71.3 DIETARY COUNSELING AND SURVEILLANCE: Primary | ICD-10-CM

## 2023-01-11 DIAGNOSIS — E89.1 POST-PANCREATECTOMY DIABETES (H): ICD-10-CM

## 2023-01-11 PROCEDURE — 99207 PR NO CHARGE LOS: CPT | Performed by: DIETITIAN, REGISTERED

## 2023-01-11 NOTE — PROGRESS NOTES
Medical Nutrition Therapy Consult Telephone Call       At time of the appointment, patient was a no show for video consult.     Sent video visit link to phone by text. Also attempted to reach patient by phone [phone]. Spoke with patient she had thought her follow up nutrition consult was on January 20th and not today. Talked with patient regarding her progress since our initial consult and if she is able to tolerate foods discussed and able to reference resource provided. She stated she she would review the resources. Unsure if patient had read [place]re [place] messages so I provided contact info that was in Alexandre de [place] regarding dietitian again by phone who worked with her previously while on tube feeding. Patient is still interested in working with previous dietitian. Patient stated if she is unable to get scheduled with Shasha over the next week that she would reach out by Alexandre de [place] to reschedule our follow up for next week.     Shasha Potts RD LD. She is out of the [facility] location.      [address] [place] [address],  [place] [address]  PHONE NUMBER  [phone]      If interested in rescheduling patient can also call [phone] to book another consult.       Kaitlyn Martinez RD, CLT, LD  Integrative Registered Dietitian

## 2023-02-07 ENCOUNTER — MYC MEDICAL ADVICE (OUTPATIENT)
Dept: INTERNAL MEDICINE | Facility: CLINIC | Age: 58
End: 2023-02-07

## 2023-02-07 ENCOUNTER — OFFICE VISIT (OUTPATIENT)
Dept: INTERNAL MEDICINE | Facility: CLINIC | Age: 58
End: 2023-02-07
Payer: COMMERCIAL

## 2023-02-07 VITALS
WEIGHT: 129.7 LBS | OXYGEN SATURATION: 99 % | HEART RATE: 81 BPM | SYSTOLIC BLOOD PRESSURE: 135 MMHG | BODY MASS INDEX: 23.72 KG/M2 | DIASTOLIC BLOOD PRESSURE: 84 MMHG

## 2023-02-07 DIAGNOSIS — M54.41 ACUTE BILATERAL LOW BACK PAIN WITH RIGHT-SIDED SCIATICA: Primary | ICD-10-CM

## 2023-02-07 DIAGNOSIS — R10.31 ABDOMINAL PAIN, RIGHT LOWER QUADRANT: ICD-10-CM

## 2023-02-07 DIAGNOSIS — K43.9 VENTRAL HERNIA WITHOUT OBSTRUCTION OR GANGRENE: ICD-10-CM

## 2023-02-07 PROCEDURE — 99214 OFFICE O/P EST MOD 30 MIN: CPT | Mod: GC

## 2023-02-07 RX ORDER — CYCLOBENZAPRINE HCL 5 MG
5 TABLET ORAL 3 TIMES DAILY PRN
Qty: 90 TABLET | Refills: 0 | Status: SHIPPED | OUTPATIENT
Start: 2023-02-07

## 2023-02-07 NOTE — PROGRESS NOTES
PRIMARY CARE CENTER       SUBJECTIVE:  Chantell Kidd is a 57 year old female with a PMHx of pancreatectomy with islet cell transplant and subsequent poorly controlled diabetes who comes in for evaluation of abdominal pain and back pain. For her abdominal pain, she reports that it started about 3 weeks ago. It is located in the RLQ of her abdomen and she notices a protrusion of a mass the size of her fist. She states that it is similar to a previous hernia she had, which required surgery. She reports that straining causes protrusion of this mass. Nothing has helped the pain. Associated nausea and bloating when this occurs. For the back pain, it started about 2 weeks ago, she experiences the pain in her hips and bilateral lower buttocks with radiation down bilateral thighs and legs. She has used heating pad, ibuprofen, voltaren gel, and heating pads but still experiences a 10/10 pain. She states that pain is severe that she has difficulty laying on her sides. She denies red flag symptoms such as fevers, saddle anesthesia, loss of bowel tone, and weight loss related to the onset of symptoms. She denies blood in stools, constipation, diarrhea, chills, sweats, or dyspnea.    Medications and allergies reviewed by me today.     ROS:   Constitutional, neuro, ENT, endocrine, pulmonary, cardiac, gastrointestinal, genitourinary, musculoskeletal, integument and psychiatric systems are negative, except as otherwise noted.    OBJECTIVE:    /84 (BP Location: Right arm, Patient Position: Sitting, Cuff Size: Adult Regular)   Pulse 81   Wt 58.8 kg (129 lb 11.2 oz)   SpO2 99%   BMI 23.72 kg/m     Wt Readings from Last 1 Encounters:   02/07/23 58.8 kg (129 lb 11.2 oz)       GENERAL APPEARANCE: healthy, alert and no distress     EYES: EOMI, PERRL     HENT: ear canals and TM's normal and nose and mouth without ulcers or lesions     NECK: no adenopathy, no asymmetry, masses, or scars and thyroid normal to  palpation     RESP: lungs clear to auscultation - no rales, rhonchi or wheezes     CV: regular rates and rhythm, normal S1 S2, no S3 or S4 and no murmur, click or rub     ABDOMEN:  soft, nontender, no HSM or masses and bowel sounds normal. No protrusion of abdominal mass with valsalva     MS: extremities normal- no gross deformities noted, no evidence of inflammation in joints, FROM in all extremities. Straight leg raise positive for radiating pain in bilateral lower extremity. Tenderness to palpation of paraspinal muscles in lower back and in bilateral gluteus jourdan.     SKIN: no suspicious lesions or rashes     NEURO: Normal strength and tone, sensory exam grossly normal, mentation intact and speech normal     PSYCH: mentation appears normal. and affect normal/bright     LYMPHATICS: No cervical adenopathy     ASSESSMENT/PLAN:    Chantell was seen today for abdominal pain.    Diagnoses and all orders for this visit:    Acute bilateral low back pain with bilateral sciatica  Likely due to herniated disc given history and physical exam findings of positive straight leg raise and bilateral gluteus jourdan pain to palpation. No red flag symptoms.  -     cyclobenzaprine (FLEXERIL) 5 MG tablet; Take 1 tablet (5 mg) by mouth 3 times daily as needed for muscle spasms  -     MR Lumbar Spine w/o & w Contrast; Future  -     Spine  Referral; Future    Ventral hernia without obstruction or gangrene  -     Adult General Surg Referral; Future  -  Many CT abdomen pelvis in the past; will skip to surgery referral for further evaluation.    Wilner Dougherty Jr., MD  Internal Medicine- PGY2  [facility]  Pager: [phone]  Feb 7, 2023    Pt was seen and plan of care discussed with Dr. Ardon.

## 2023-02-07 NOTE — NURSING NOTE
Chantell Kidd is a 57 year old female that presents in clinic today for the following:     Chief Complaint   Patient presents with     Abdominal Pain     Pt has been having sharp pains in stomach, back, hip down to foot       The patient's allergies and medications were reviewed. The patient's vitals were obtained without incident. The patient does not have any other questions for the provider.     Drea Casey, EMT at 7:56 AM on 2/7/2023.  Primary Care Clinic: [phone]

## 2023-02-08 ENCOUNTER — VIRTUAL VISIT (OUTPATIENT)
Dept: TRANSPLANT | Facility: CLINIC | Age: 58
End: 2023-02-08
Attending: PEDIATRICS
Payer: COMMERCIAL

## 2023-02-08 ENCOUNTER — DOCUMENTATION ONLY (OUTPATIENT)
Dept: INTERNAL MEDICINE | Facility: CLINIC | Age: 58
End: 2023-02-08
Payer: COMMERCIAL

## 2023-02-08 DIAGNOSIS — E89.1 POST-PANCREATECTOMY DIABETES (H): ICD-10-CM

## 2023-02-08 DIAGNOSIS — E10.649 TYPE 1 DIABETES MELLITUS WITH HYPOGLYCEMIA AND WITHOUT COMA (H): ICD-10-CM

## 2023-02-08 DIAGNOSIS — Z90.410 POST-PANCREATECTOMY DIABETES (H): ICD-10-CM

## 2023-02-08 DIAGNOSIS — E13.9 POST-PANCREATECTOMY DIABETES (H): ICD-10-CM

## 2023-02-08 PROCEDURE — 97803 MED NUTRITION INDIV SUBSEQ: CPT | Mod: TEL,95 | Performed by: DIETITIAN, REGISTERED

## 2023-02-08 NOTE — PROGRESS NOTES
Type of Form Received: FMLA    Form Received (Date) 2/8/23   Form Filled out Yes 2/24/23-sent to patient   Placed in provider folder Yes

## 2023-02-08 NOTE — LETTER
"    2/8/2023         RE: Chantell Kidd  [address] [facility] [address]        Dear Colleague,    Thank you for referring your patient, Chantell Kidd, to the [facility]. Please see a copy of my visit note below.    Pt evaluated via billable telephone visit d/t COVID-19 restrictions. Time spent: 30 min  Provider location: offsite    [facility]  Outpatient MNT     Time Spent: 30 minutes  Visit Type: follow up   Referring Physician: Gunnar   Reason for RD Visit: weight loss, hyperglycemia  Pt accompanied by: self     Nutrition Assessment  Pt met with another RD for weight loss, vomiting, etc a few months ago. Chantell reports having tried her interventions, but did not notice any improvements. Prior RD suggested glutenfree/dairyfree diet, probiotics, etc.     Chantell reports vomiting 4x/day x several months now. She reports chronic GI issues (prior to TP AIT even), yet worse the past several months. She correlates worsening of sx in part due to recurrent UTI. Her vomiting is not necessarily r/t food. She may vomit up chicken broth, sometimes her Creon, etc. Consuming liquids over solids provide her no benefit in terms of GI sx. She feels very thirsty and is drinking a lot of water. She reports not having met with GI in the recent past.     I did meet with Chantell back in 2018. She had a surgical FT placed in 2016 and had been on cycled TF (which she reports tolerating well) for at least 2 years. She is interested in having another FT placed, as she notes this helped her \"get through it\" last time.     She often has loose, oily stools, then has a few days w/o BM and takes miralax to help with occasional constipation. She reports her Creon dose was increased by 2 pills ~1 year ago. She takes 8 Creon 12 with meals. She takes 2 20-30 min prior to eating (per rec of Dr Maher, per pt), then some during eating, at end of meal, etc. This Creon dose should be sufficient, providing 1847 " "ul/kg/meal.     She also has the added layer of DM I and persistent hyperglycemia. Of note, A1c last Sept 2022 was >13%. She reports her BG became even harder to manage Oct/Nov, which she correlates with frequent UTI. She was on the Medtronic pump, then switched over to Dexcom & Omnipod 5 integration. She has been on this system ~2 months, but her BG are persistently 500+. She reports having called the Omnipod reps, meeting with Endo (last saw one in [place] a few months ago; next appt with Dr Maher is in March). She was meeting with CDE every week or 2, but her CDE left and has not yet been assigned a new one. She does report giving manual bolus corrections, which help improve her BG for 30-60 minutes, yet then they climb high again. I have seen 2-3 month \"adjustment\" with the Omnipod 5 where it takes an extended period of time for the algorithm to learn insulin needs, yet her extremely high BG is likely the root of her issues- weight loss, vomiting up Creon, vomiting in general, etc.     Weight hx: Pt reports she is down to 115 lbs/52 kg.   Wt Readings from Last 10 Encounters:   02/07/23 58.8 kg (129 lb 11.2 oz)   11/04/22 57.1 kg (125 lb 12.8 oz)   09/26/22 52.6 kg (116 lb)   04/01/21 66.9 kg (147 lb 8 oz)   11/08/20 67.5 kg (148 lb 13 oz)   11/21/19 67.2 kg (148 lb 3.2 oz)   07/02/19 63.3 kg (139 lb 9.6 oz)   06/26/19 63.5 kg (140 lb)   06/10/19 62.8 kg (138 lb 8 oz)   04/18/19 61.1 kg (134 lb 11.2 oz)     Anthropometrics   Dosing wt: 115 lbs/52 kg    Estimated Nutrition Needs   Estimated Energy Needs: 7204-2341 kcal (30-35 kcal/kg) for weight gain  Estimated Protein Needs: 52-62 grams/day (1-1.2 g/kg) for repletion     Nutrition Diagnosis  Unintended weight loss related to inability to keep food down/vomiting, hyperglycemia as evidenced by last A1c >13%, pt report of persistent hyperglycemia 400-600+ mg/dL, pt report of ~40 lb wt loss x months, very minimal PO intake.     Nutrition Intervention  1. Reviewed " evidence on timing of taking enzymes and that taking them with food or at end of meal proves to be most effective compared to 20-30 min pre-meal. Consider switching timing of enzymes to see if this helps.   2. However, the fact that she is vomiting frequently, including up her Creon sometimes, is also impacting absorption.   3. Recommend she see GI. Pt is interested in a FT. However, I would like to see her BG improve and stabilize for a certain time point prior to placing a FT. There is also the added layer of feeding formula, chasing possible high BG with formula, needing to add insulin for tube feeds, etc.   4. Discussed w/ Chantell in detail that her BG are likely the root of many of her sx and if we can get her BG in a more stable, consistent place, her sx hopefully will lessen. Will send a message to Dr Maher & her txp coordinator, Wilmer, about getting her re-established with a CDE asap prior to her endo appt with Dr Maher in 1 month.     Patient Understanding: Pt verbalized understanding of education provided.  Expected Engagement: Good  Follow-Up Plans: PRN     Nutrition Goals  1. Weight stability >/= 115 lbs  2. Improvement in glycemic control     Susan Potts, RD, LD, CCTD

## 2023-02-08 NOTE — TELEPHONE ENCOUNTER
I spoke to Chantell and she said the form is the same as the one from 11/2022, so she is going upload the form to [employer] to copy into the new form.  She is aware Dr. Morgan will be back in clinic on 2/20/23 to sign the form the.    Catherine Espinoza on 2/8/2023 at 1:28 PM

## 2023-02-08 NOTE — PROGRESS NOTES
"Pt evaluated via billable telephone visit d/t COVID-19 restrictions. Time spent: 30 min  [facility]     Time Spent: 30 minutes  Visit Type: follow up   Referring Physician: Gunnar   Reason for RD Visit: weight loss, hyperglycemia  Pt accompanied by: self     Nutrition Assessment  Pt met with another RD for weight loss, vomiting, etc a few months ago. Chantell reports having tried her interventions, but did not notice any improvements. Prior RD suggested glutenfree/dairyfree diet, probiotics, etc.     Chantell reports vomiting 4x/day x several months now. She reports chronic GI issues (prior to TP AIT even), yet worse the past several months. She correlates worsening of sx in part due to recurrent UTI. Her vomiting is not necessarily r/t food. She may vomit up chicken broth, sometimes her Creon, etc. Consuming liquids over solids provide her no benefit in terms of GI sx. She feels very thirsty and is drinking a lot of water. She reports not having met with GI in the recent past.     I did meet with Chantell back in 2018. She had a surgical FT placed in 2016 and had been on cycled TF (which she reports tolerating well) for at least 2 years. She is interested in having another FT placed, as she notes this helped her \"get through it\" last time.     She often has loose, oily stools, then has a few days w/o BM and takes miralax to help with occasional constipation. She reports her Creon dose was increased by 2 pills ~1 year ago. She takes 8 Creon 12 with meals. She takes 2 20-30 min prior to eating (per rec of Dr Maher, per pt), then some during eating, at end of meal, etc. This Creon dose should be sufficient, providing 1847 ul/kg/meal.     She also has the added layer of DM I and persistent hyperglycemia. Of note, A1c last Sept 2022 was >13%. She reports her BG became even harder to manage Oct/Nov, which she correlates with frequent UTI. She was on the Medtronic pump, then " "switched over to Dexcom & Omnipod 5 integration. She has been on this system ~2 months, but her BG are persistently 500+. She reports having called the Omnipod reps, meeting with Endo (last saw one in [place] a few months ago; next appt with Dr Maher is in March). She was meeting with CDE every week or 2, but her CDE left and has not yet been assigned a new one. She does report giving manual bolus corrections, which help improve her BG for 30-60 minutes, yet then they climb high again. I have seen 2-3 month \"adjustment\" with the Omnipod 5 where it takes an extended period of time for the algorithm to learn insulin needs, yet her extremely high BG is likely the root of her issues- weight loss, vomiting up Creon, vomiting in general, etc.     Weight hx: Pt reports she is down to 115 lbs/52 kg.   Wt Readings from Last 10 Encounters:   02/07/23 58.8 kg (129 lb 11.2 oz)   11/04/22 57.1 kg (125 lb 12.8 oz)   09/26/22 52.6 kg (116 lb)   04/01/21 66.9 kg (147 lb 8 oz)   11/08/20 67.5 kg (148 lb 13 oz)   11/21/19 67.2 kg (148 lb 3.2 oz)   07/02/19 63.3 kg (139 lb 9.6 oz)   06/26/19 63.5 kg (140 lb)   06/10/19 62.8 kg (138 lb 8 oz)   04/18/19 61.1 kg (134 lb 11.2 oz)     Anthropometrics   Dosing wt: 115 lbs/52 kg    Estimated Nutrition Needs   Estimated Energy Needs: 4927-2901 kcal (30-35 kcal/kg) for weight gain  Estimated Protein Needs: 52-62 grams/day (1-1.2 g/kg) for repletion     Nutrition Diagnosis  Unintended weight loss related to inability to keep food down/vomiting, hyperglycemia as evidenced by last A1c >13%, pt report of persistent hyperglycemia 400-600+ mg/dL, pt report of ~40 lb wt loss x months, very minimal PO intake.     Nutrition Intervention  1. Reviewed evidence on timing of taking enzymes and that taking them with food or at end of meal proves to be most effective compared to 20-30 min pre-meal. Consider switching timing of enzymes to see if this helps.   2. However, the fact that she is vomiting frequently, " including up her Creon sometimes, is also impacting absorption.   3. Recommend she see GI. Pt is interested in a FT. However, I would like to see her BG improve and stabilize for a certain time point prior to placing a FT. There is also the added layer of feeding formula, chasing possible high BG with formula, needing to add insulin for tube feeds, etc.   4. Discussed w/ Chantell in detail that her BG are likely the root of many of her sx and if we can get her BG in a more stable, consistent place, her sx hopefully will lessen. Will send a message to Dr Maher & her txp coordinator, Wilmer, about getting her re-established with a CDE asap prior to her endo appt with Dr Maher in 1 month.     Patient Understanding: Pt verbalized understanding of education provided.  Expected Engagement: Good  Follow-Up Plans: PRN     Nutrition Goals  1. Weight stability >/= 115 lbs  2. Improvement in glycemic control     Susan Potts, RD, LD, CCTD

## 2023-02-09 ENCOUNTER — PATIENT OUTREACH (OUTPATIENT)
Dept: SURGERY | Facility: CLINIC | Age: 58
End: 2023-02-09
Payer: COMMERCIAL

## 2023-02-09 ENCOUNTER — TELEPHONE (OUTPATIENT)
Dept: TRANSPLANT | Facility: CLINIC | Age: 58
End: 2023-02-09
Payer: COMMERCIAL

## 2023-02-09 NOTE — PROGRESS NOTES
Patient Telephone Reminder Call    Date of call:  02/09/23  Phone numbers:  Home number on file [phone] (home)    Reached patient/confirmed appointment:  Yes  Appointment with:   Dr. Chang Gutierrez  Reason for visit:  Hernia (did not want to wait to see Dr. Bernabe)  Remind patient that this visit is a consultation only, NO procedure:  No  Can this visit be changed to a video visit:  No

## 2023-02-09 NOTE — TELEPHONE ENCOUNTER
Called Chantell this morning after having a conversation with our endocrinologist and dietitian about her current uncontrolled diabetes.  Most of her current issues are due to very high blood sugars.  I advised that Chantell go the ED this morning so she can get her sugars stabilized as she could go into DKA.  She understood this but I doubt she will follow through with going.  I also asked her to follow up with the [facility] as they have 2 CDE employees there that can help currently and get her the things she needs until she meets with Dr Maher. She understood what I was saying and will call them today.  I asked Chantell to follow up with me when she has either gone to the ED and/or called the [facility].

## 2023-02-10 ENCOUNTER — PRE VISIT (OUTPATIENT)
Dept: SURGERY | Facility: CLINIC | Age: 58
End: 2023-02-10

## 2023-02-10 ENCOUNTER — OFFICE VISIT (OUTPATIENT)
Dept: SURGERY | Facility: CLINIC | Age: 58
End: 2023-02-10
Attending: INTERNAL MEDICINE
Payer: COMMERCIAL

## 2023-02-10 VITALS
SYSTOLIC BLOOD PRESSURE: 135 MMHG | HEIGHT: 62 IN | WEIGHT: 130.5 LBS | OXYGEN SATURATION: 98 % | DIASTOLIC BLOOD PRESSURE: 79 MMHG | BODY MASS INDEX: 24.01 KG/M2 | HEART RATE: 89 BPM

## 2023-02-10 DIAGNOSIS — R10.10 PAIN OF UPPER ABDOMEN: ICD-10-CM

## 2023-02-10 PROCEDURE — 99203 OFFICE O/P NEW LOW 30 MIN: CPT | Performed by: SURGERY

## 2023-02-10 ASSESSMENT — ENCOUNTER SYMPTOMS
BACK PAIN: 1
PANIC: 1
ARTHRALGIAS: 1
EYE IRRITATION: 1
DIARRHEA: 1
WEIGHT LOSS: 1
NUMBNESS: 1
ORTHOPNEA: 0
EYE REDNESS: 1
PARALYSIS: 0
BRUISES/BLEEDS EASILY: 1
SEIZURES: 0
EYE PAIN: 1
INCREASED ENERGY: 1
TINGLING: 1
DISTURBANCES IN COORDINATION: 1
FLANK PAIN: 1
DYSURIA: 1
DEPRESSION: 1
SHORTNESS OF BREATH: 0
BLOOD IN STOOL: 1
MEMORY LOSS: 0
DECREASED CONCENTRATION: 1
LOSS OF CONSCIOUSNESS: 1
DOUBLE VISION: 0
NIGHT SWEATS: 1
BREAST MASS: 0
INSOMNIA: 1
BOWEL INCONTINENCE: 0
POLYDIPSIA: 1
DIZZINESS: 1
LEG PAIN: 1
HEADACHES: 1
LIGHT-HEADEDNESS: 1
FATIGUE: 1
ABDOMINAL PAIN: 1
POLYPHAGIA: 0
HYPOTENSION: 0
NECK PAIN: 1
SYNCOPE: 1
TREMORS: 1
SWOLLEN GLANDS: 0
HEARTBURN: 1
MYALGIAS: 1
HEMATURIA: 1
PALPITATIONS: 0
SLEEP DISTURBANCES DUE TO BREATHING: 0
DIFFICULTY URINATING: 1
WEAKNESS: 1
NERVOUS/ANXIOUS: 1
COUGH: 1
BLOATING: 1
VOMITING: 1
CHILLS: 0
WEIGHT GAIN: 0
JOINT SWELLING: 1
NAUSEA: 1
HEMOPTYSIS: 0
EXERCISE INTOLERANCE: 1
BREAST PAIN: 1
EYE WATERING: 1
HYPERTENSION: 0
STIFFNESS: 1
SPUTUM PRODUCTION: 0
RECTAL PAIN: 1
HALLUCINATIONS: 0
SPEECH CHANGE: 0
JAUNDICE: 0
MUSCLE WEAKNESS: 1
DECREASED APPETITE: 1
CONSTIPATION: 1
ALTERED TEMPERATURE REGULATION: 1
MUSCLE CRAMPS: 1
DECREASED LIBIDO: 0
FEVER: 1
HOT FLASHES: 1

## 2023-02-10 ASSESSMENT — PAIN SCALES - GENERAL: PAINLEVEL: EXTREME PAIN (8)

## 2023-02-10 NOTE — LETTER
2/10/2023       RE: Chantell Kidd  [address]  [facility] [address]     Dear Colleague,    Thank you for referring your patient, Chantell Kidd, to the [facility]. Please see a copy of my visit note below.    General Surgery Clinic Note - New Patient Visit    NAME: Chantell Kidd,  57 year old female    PCP: Ron Morgan  MRN:   [id number]      #: [phone]  Date: Feb 10, 2023    Chief Complaint:     History of Present Illness: Chantell Kidd is a 57 year old female who presents to the clinic with concerns for recurrent hernia She states that she had 1 year of bilateral upper quadrant pain, worse with exertion that feels like a fist pushing out of her. It leaves her sore at both costal margins. She denies any nausea/vomiting or change in bowel habits associated with this. Recent imaging from October also reviewed which is concurrent with her symptoms and this revealed no evidence of pathology that would explain her symptoms.    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    TPIAT     Spasm of sphincter of Oddi        Past Surgical History: History in Epic reviewed with the patient:  Past Surgical History:   Procedure Laterality Date     ARTHROPLASTY CARPOMETACARPAL (THUMB JOINT)  5/2/2014    Procedure: ARTHROPLASTY CARPOMETACARPAL (THUMB JOINT);  Surgeon: Carina Panda MD;  Location: [facility]     CHOLECYSTECTOMY  2004     COLONOSCOPY  7/18/2014     Procedure: COLONOSCOPY;  Surgeon: Aurora Sahu MD;  Location: [facility]     COLONOSCOPY N/A 8/1/2017    Procedure: COLONOSCOPY;  Colonoscopy and upper endoscopy;  Surgeon: Deirdre Harris MD;  [facility]: [facility]     ENDOSCOPIC RETROGRADE CHOLANGIOPANCREATOGRAM       ENDOSCOPIC RETROGRADE CHOLANGIOPANCREATOGRAM  4/19/2011    Procedure:ENDOSCOPIC RETROGRADE CHOLANGIOPANCREATOGRAM; Pancreatic Stent Placement       ENDOSCOPIC RETROGRADE CHOLANGIOPANCREATOGRAM  5/26/2011    Procedure:ENDOSCOPIC RETROGRADE CHOLANGIOPANCREATOGRAM; with Pancreatic Stent Removal; Surgeon:DALE MIMS; [facility]     ENDOSCOPY UPPER, COLONOSCOPY, COMBINED  4/25/2012    Procedure:COMBINED ENDOSCOPY UPPER, COLONOSCOPY; Enteroscopy with Bile Duct Stent Removal, Colonoscopy  *Latex Safe Room*; Surgeon:GRACY GODWINIQ; [facility]     ESOPHAGOSCOPY, GASTROSCOPY, DUODENOSCOPY (EGD), COMBINED  5/26/2011    Procedure:COMBINED ESOPHAGOSCOPY, GASTROSCOPY, DUODENOSCOPY (EGD); Surgeon:DALE MIMS; [facility]     ESOPHAGOSCOPY, GASTROSCOPY, DUODENOSCOPY (EGD), COMBINED N/A 10/30/2014    Procedure: COMBINED ESOPHAGOSCOPY, GASTROSCOPY, DUODENOSCOPY (EGD), BIOPSY SINGLE OR MULTIPLE;  Surgeon: Sarai Moon MD;  [facility]     ESOPHAGOSCOPY, GASTROSCOPY, DUODENOSCOPY (EGD), COMBINED Left 7/6/2015    Procedure: COMBINED ESOPHAGOSCOPY, GASTROSCOPY, DUODENOSCOPY (EGD), BIOPSY SINGLE OR MULTIPLE;  Surgeon: Thomas Estrada MD;  [facility]     ESOPHAGOSCOPY, GASTROSCOPY, DUODENOSCOPY (EGD), COMBINED N/A 7/8/2016    Procedure: COMBINED ESOPHAGOSCOPY, GASTROSCOPY, DUODENOSCOPY (EGD), BIOPSY SINGLE OR MULTIPLE;  Surgeon: Eloy Klein MD;  [facility]     ESOPHAGOSCOPY, GASTROSCOPY, DUODENOSCOPY (EGD), COMBINED N/A 8/4/2016    Procedure: COMBINED ESOPHAGOSCOPY, GASTROSCOPY, DUODENOSCOPY (EGD), BIOPSY SINGLE OR MULTIPLE;  Surgeon: Jason Brown MD;   [facility]     ESOPHAGOSCOPY, GASTROSCOPY, DUODENOSCOPY (EGD), COMBINED N/A 8/1/2017    Procedure: COMBINED ESOPHAGOSCOPY, GASTROSCOPY, DUODENOSCOPY (EGD);;  Surgeon: Deirdre Harris MD;  [facility]     ESOPHAGOSCOPY, GASTROSCOPY, DUODENOSCOPY (EGD), COMBINED N/A 6/12/2019    Procedure: ESOPHAGOGASTRODUODENOSCOPY (EGD);  Surgeon: Jose Francisco Perdomo MD;  [facility]     GYN SURGERY      Hysterectomy and USO     [facility] ENDOSCOPY W EUS  7/20/2011    Procedure:COMBINED ENDOSCOPIC ULTRASOUND, ESOPHAGOSCOPY, GASTROSCOPY, DUODENOSCOPY (EGD); Surgeon:DARVIN DONOHUE; [facility]     HERNIORRHAPHY VENTRAL N/A 9/15/2016    Procedure: HERNIORRHAPHY VENTRAL;  Surgeon: Juanita Bernabe MD;  [facility]     HYSTERECTOMY  1997 or 1998    USO     INCISION AND DRAINAGE ABDOMEN WASHOUT, COMBINED  8/16/2012    Procedure: COMBINED INCISION AND DRAINAGE ABDOMEN WASHOUT;  ,debridement and Drainage Post Appendectomy;  Surgeon: Ron Austin MD;  [facility]     INJECT TRANSVERSUS ABDOMINIS PLANE (TAP) BLOCK BILATERAL Bilateral 5/26/2016    Procedure: INJECT TRANSVERSUS ABDOMINIS PLANE (TAP) BLOCK BILATERAL;  Surgeon: Leonard Mccallum MD;  [facility]     LAPAROSCOPIC APPENDECTOMY  7/30/2012    Procedure: LAPAROSCOPIC APPENDECTOMY;  Open Appendectomy;  Surgeon: Ron Austin MD;  [facility]     PANCREATECTOMY, TRANSPLANT AUTO ISLET CELL, COMBINED  1/6/2012    Procedure:COMBINED PANCREATECTOMY, TRANSPLANT AUTO ISLET CELL; Total  Pancreatectomy, Auto Islet Transplant, splenectomy, 18fr. transgastric-jejunal feeding tube placement, liver biopsy; Surgeon:PALAK LEE; [facility]     REPLACE GASTROSTOMY TUBE, PERCUTANEOUS N/A 8/30/2017    Procedure: REPLACE GASTROSTOMY TUBE, PERCUTANEOUS;  GJ Tube Change;  Surgeon: Jose Nath PA-C;  [facility]     SPLENECTOMY         Family History: History in Epic reviewed with the patient:  Family  History   Problem Relation Age of Onset     Hypertension Mother      Diabetes Mother      Osteoporosis Mother      Cancer Father         pancreatic cancer     Diabetes Maternal Grandmother      Cardiovascular Maternal Grandmother      Cancer Maternal Grandfather         lung cancer     Cancer Sister         brain     Cancer Sister         liver cancer       Social History: History in Epic reviewed with the patient:  Social History     Socioeconomic History     Marital status:      Spouse name: Not on file     Number of children: Not on file     Years of education: Not on file     Highest education level: Not on file   Occupational History     Not on file   Tobacco Use     Smoking status: Never     Smokeless tobacco: Never   Substance and Sexual Activity     Alcohol use: No     Alcohol/week: 0.0 standard drinks     Drug use: No     Sexual activity: Yes   Other Topics Concern     Parent/sibling w/ CABG, MI or angioplasty before 65F 55M? Not Asked   Social History Narrative     Not on file     Social Determinants of Health     Financial Resource Strain: Not on file   Food Insecurity: Not on file   Transportation Needs: Not on file   Physical Activity: Not on file   Stress: Not on file   Social Connections: Not on file   Intimate Partner Violence: Not on file   Housing Stability: Not on file       Allergies:     Allergies   Allergen Reactions     Corticosteroids Other (See Comments)     All oral, IV and injectable steroids are contraindicated (unless in life threatening situations) in Islet Auto transplant recipients. They can cause irreversible loss of islet cell function. Please contact patient's transplant care coordinator YURI Cortez RN at [phone]/pager [phone] and/or endocrinologist prior to administration.       Chocolate Flavor Rash     Breaks out when eats chocolate       Outpatient Medications:  Outpatient Encounter Medications as of 2/10/2023   Medication Sig Dispense Refill      acetaminophen (TYLENOL) 325 MG tablet Take 3 tablets (975 mg) by mouth every 8 hours       alendronate (FOSAMAX) 70 MG tablet Take 1 tablet (70 mg) by mouth every 7 days On Sundays take first thing in the morning with plain water and remain upright for at least 30 minutes and until after first food of the day    Do not restart Fosamax (alendronate) until your difficulty swallowing has resolved and you have finished the entire course of fluconazole (Diflucan). 4 tablet 0     alum & mag hydroxide-simethicone (MYLANTA/MAALOX) 200-200-25 MG CHEW chewable tablet Take 1 tablet by mouth 3 times daily as needed for indigestion       amylase-lipase-protease (CREON 12) 69509 units CPEP Take 6 to 8 capsules by mouth with meals and take 4 capsules with snacks. Needs up to 25 capsules per day 750 capsule 5     cephALEXin (KEFLEX) 500 MG capsule Take 1 capsule (500 mg) by mouth 2 times daily To complete a second course for a 14 day total of antibiotics. 14 capsule 0     Continuous Blood Gluc  (DEXCOM G6 ) RICARDO Use to read blood sugars as per 's instructions. 1 each 0     Continuous Blood Gluc Sensor (DEXCOM G6 SENSOR) MISC Change every 10 days. 9 each 3     Continuous Blood Gluc Transmit (DEXCOM G6 TRANSMITTER) MISC Change every 3 months. 1 each 3     CONTOUR NEXT TEST test strip USE TO TEST BLOOD SUGAR 6 TIMES DAILY OR AS DIRECTED 600 strip 1     cyclobenzaprine (FLEXERIL) 10 MG tablet Take 10 mg by mouth 3 times daily as needed for muscle spasms       cyclobenzaprine (FLEXERIL) 5 MG tablet Take 1 tablet (5 mg) by mouth 3 times daily as needed for muscle spasms 90 tablet 0     diclofenac (FLECTOR) 1.3 % Patch Place 1 patch onto the skin 2 times daily 30 each 1     diclofenac (VOLTAREN) 1 % GEL 2 g Apply 2 g to skin four times a day as needed (to affected upper extremity joint(s)). Maximum 8g/day per joint, 16g/day total.       dicyclomine (BENTYL) 10 MG capsule Take 10 mg by mouth every 6 hours        dronabinol (MARINOL) 2.5 MG capsule Take 2 capsules (5 mg) by mouth 2 times daily as needed 56 capsule 5     DULoxetine (CYMBALTA) 30 MG capsule Take 1 capsule (30 mg) by mouth daily With 60mg capsule for total dose of 90mg 90 capsule 0     DULoxetine (CYMBALTA) 60 MG EC capsule Take 1 capsule (60 mg) by mouth daily With 30mg capsule for total dose of 90mg 90 capsule 1     famotidine (PEPCID) 20 MG tablet Take 1 tablet (20 mg) by mouth At Bedtime 30 tablet 0     hydrocortisone (CORTAID) 1 % external cream Apply topically 3 times daily 45 g 3     hydrocortisone (CORTEF) 10 MG tablet Take 10 mg in the morning and 10 mg along with a 5 mg tablet at bedtime for a total dose of 15 mg at bedtime. Watch for hypoglycemia recurrence.       hydrocortisone (CORTEF) 5 MG tablet Take 5 mg by mouth At Bedtime along with a 10 mg tablet for a total dose of 15 mg       Injection Device for insulin (NOVOPEN ECHO) RICARDO Use with   Insulin 1 each 0     insulin aspart (NOVOLOG VIAL) 100 UNITS/ML vial Via insulin pump. Approx 60 units daily. 60 mL 1     insulin aspart (NOVOLOG VIAL) 100 UNITS/ML vial USE TO FILL INSULIN PUMP RESERVOIR. NEEDS 150 UNITS EVERY 3 DAYS. CALL [facility] TO SCHEDULE FOLLOW UP APPOINTMENT 10 mL 1     insulin aspart (NOVOPEN ECHO) 100 UNIT/ML cartridge As  instructed per physician 5 mL 0     Insulin Disposable Pump (OMNIPOD 5 G6 INTRO, GEN 5,) KIT 1 each See Admin Instructions Use continuously to infuse insulin. 1 kit 0     Insulin Disposable Pump (OMNIPOD 5 G6 POD, GEN 5,) MISC 1 each See Admin Instructions Change pod every 2-3 days. 35 each 3     levothyroxine (SYNTHROID/LEVOTHROID) 112 MCG tablet Take 1 tablet (112 mcg) by mouth daily 90 tablet 3     linaclotide (LINZESS) 145 MCG capsule Take 145 mcg by mouth every morning (before breakfast) as needed       metoclopramide (REGLAN) 5 MG tablet Take 2 tablets (10 mg) by mouth 3 times daily 180 tablet 3     Nutritional Supplements (BOOST HIGH PROTEIN) LIQD After above  baseline labs are drawn, give: 6 mL/kg to maximum of 360 mL; the beverage is to be consumed within 5 minutes. (Patient taking differently: as needed After above baseline labs are drawn, give: 6 mL/kg to maximum of 360 mL; the beverage is to be consumed within 5 minutes.)  0     ondansetron (ZOFRAN) 4 MG tablet Take 1 tablet (4 mg) by mouth every 8 hours as needed for nausea 30 tablet 0     ondansetron (ZOFRAN-ODT) 4 MG ODT tab DISSOLVE ONE TABLET ON THE TONGUE EVERY 6 HOURS AS NEEDED FOR NAUSEA AND VOMITING 60 tablet 2     order for DME by [employer] [facility] [place], [place]  Ph: [phone]  Fax: [phone]    Nutren 1.5 @ 10 ml/hr with advancement by 10 ml/hr q 8 hours to goal rate of 40 ml/hr.  This will provide 1440 kcals (27 kcal/kg/day), 65 g PRO (1.2 g/kg/day), 730 mL H2O, 169 g CHO and no Fiber daily.    1 Month 3     pantoprazole (PROTONIX) 40 MG EC tablet Take 1 tablet (40 mg) by mouth 2 times daily 180 tablet 3     polyethylene glycol (MIRALAX/GLYCOLAX) packet Take 1 packet by mouth 2 times daily as needed for constipation  14 each 5     potassium chloride ER (KLOR-CON M) 20 MEQ CR tablet Take 1 tablet (20 mEq) by mouth daily 90 tablet 1     senna-docusate (SENOKOT-S/PERICOLACE) 8.6-50 MG tablet Take 1-2 tablets by mouth daily as needed for constipation 60 tablet 3     sodium chloride (OCEAN) 0.65 % nasal spray Spray 1 spray into both nostrils daily as needed for congestion 30 mL 0     sucralfate (CARAFATE) 1 GM tablet Take 1 tablet (1 g) by mouth 4 times daily (before meals and nightly) 30 tablet 0     SUMAtriptan (IMITREX) 50 MG tablet Take 1 tablet (50 mg) by mouth at onset of headache for migraine Take 1 Tab by mouth Once as needed for Migraine Headache. May repeat after two hours.  Maximum dose 200 mg/24 hours. 30 tablet 1     topiramate (TOPAMAX) 100 MG tablet Take 1 tablet (100 mg) by mouth 2 times daily 180 tablet 1     traMADol (ULTRAM) 50 MG tablet Take 0.5-1 tablets  "(25-50 mg) by mouth every 6 hours as needed for moderate pain 15 tablet 0     traZODone (DESYREL) 100 MG tablet TAKE 1-2 TABLETS BY MOUTH AN HOUR BEFORE BEDTIME 100 tablet 2     No facility-administered encounter medications on file as of 2/10/2023.         ROS: 10 systems reviewed and all are negative except as above.    EXAM:  /79 (BP Location: Right arm, Patient Position: Sitting, Cuff Size: Adult Regular)   Pulse 89   Ht 1.575 m (5' 2\")   Wt 59.2 kg (130 lb 8 oz)   SpO2 98%   BMI 23.87 kg/m    Psych: Normal affect  Neuro: No gross focal deficits noted  Head:Normocephalic, atraumatic  Eyes: Non icteric  Neck:supple  Heart: Regular rate and rhythm  Lungs: non-labored, quiet respiration  Abdomen: soft, TTP in bilateral upper quadrants, less so in RLQ, no hernias palpated  Extremities: No obvious deformities    Imaging Data (I have personally reviewed the following reports):  No results found for this or any previous visit (from the past 744 hour(s)).    A/P: Chantell Kidd is a 57 year old female with chronic abdominal pain presenting for concerns for hernia    -no evidence of hernia on exam or on review of recent imaging, no indication for surgical intervention  -discussed with her the importance of better glucose control moving forward as this would be a barrier to any elective procedure in the future      Chang Gutierrez MD    Answers for HPI/ROS submitted by the patient on 2/10/2023  General Symptoms: Yes  Skin Symptoms: No  HENT Symptoms: No  EYE SYMPTOMS: Yes  HEART SYMPTOMS: Yes  LUNG SYMPTOMS: Yes  INTESTINAL SYMPTOMS: Yes  URINARY SYMPTOMS: Yes  GYNECOLOGIC SYMPTOMS: Yes  BREAST SYMPTOMS: Yes  SKELETAL SYMPTOMS: Yes  BLOOD SYMPTOMS: Yes  NERVOUS SYSTEM SYMPTOMS: Yes  MENTAL HEALTH SYMPTOMS: Yes  Fever: Yes  Loss of appetite: Yes  Weight loss: Yes  Weight gain: No  Fatigue: Yes  Night sweats: Yes  Chills: No  Increased stress: Yes  Excessive hunger: No  Excessive thirst: Yes  Feeling hot or cold " when others believe the temperature is normal: Yes  Loss of height: No  Post-operative complications: Yes  Surgical site pain: No  Hallucinations: No  Change in or Loss of Energy: Yes  Hyperactivity: Yes  Confusion: No  Eye pain: Yes  Vision loss: Yes  Dry eyes: No  Watery eyes: Yes  Eye bulging: No  Double vision: No  Flashing of lights: Yes  Spots: Yes  Floaters: Yes  Redness: Yes  Crossed eyes: No  Tunnel Vision: No  Yellowing of eyes: No  Eye irritation: Yes  Chest pain or pressure: No  Fast or irregular heartbeat: No  Pain in legs with walking: Yes  Trouble breathing while lying down: No  Fingers or toes appear blue: No  High blood pressure: No  Low blood pressure: No  Fainting: Yes  Murmurs: No  Pacemaker: No  Varicose veins: No  Wake up at night with shortness of breath: No  Light-headedness: Yes  Exercise intolerance: Yes  Cough: Yes  Sputum or phlegm: No  Coughing up blood: No  Difficulty breating or shortness of breath: No  Heart burn or indigestion: Yes  Nausea: Yes  Vomiting: Yes  Abdominal pain: Yes  Bloating: Yes  Constipation: Yes  Diarrhea: Yes  Blood in stool: Yes  Black stools: Yes  Rectal or Anal pain: Yes  Fecal incontinence: No  Yellowing of skin or eyes: No  Vomit with blood: Yes  Change in stools: No  Trouble holding urine or incontinence: Yes  Pain or burning: Yes  Trouble starting or stopping: Yes  Increased frequency of urination: Yes  Blood in urine: Yes  Decreased frequency of urination: No  Frequent nighttime urination: Yes  Flank pain: Yes  Difficulty emptying bladder: Yes  Bleeding or spotting between periods: No  Heavy or painful periods: No  Irregular periods: No  Vaginal discharge: Yes  Hot flashes: Yes  Vaginal dryness: No  Genital ulcers: No  Reduced libido: No  Painful intercourse: Yes  Difficulty with sexual arousal: Yes  Post-menopausal bleeding: No  Discharge: No  Lumps: No  Pain: Yes  Nipple retraction: No  Back pain: Yes  Muscle aches: Yes  Neck pain: Yes  Swollen joints:  Yes  Joint pain: Yes  Bone pain: Yes  Muscle cramps: Yes  Muscle weakness: Yes  Joint stiffness: Yes  Bone fracture: No  Edema or swelling: Yes  Anemia: Yes  Swollen glands: No  Easy bleeding or bruising: Yes  Trouble with coordination: Yes  Dizziness or trouble with balance: Yes  Fainting or black-out spells: Yes  Memory loss: No  Headache: Yes  Seizures: No  Speech problems: No  Tingling: Yes  Tremor: Yes  Weakness: Yes  Difficulty walking: Yes  Paralysis: No  Numbness: Yes  Nervous or Anxious: Yes  Depression: Yes  Trouble sleeping: Yes  Trouble thinking or concentrating: Yes  Mood changes: Yes  Panic attacks: Yes          Sincerely,    Chang Gutierrez MD

## 2023-02-10 NOTE — PROGRESS NOTES
General Surgery Clinic Note - New Patient Visit    NAME: Chantell Kidd,  57 year old female    PCP: Ron Morgan  MRN:   [id number]      #: [phone]  Date: Feb 10, 2023    Chief Complaint:     History of Present Illness: Chantell Kidd is a 57 year old female who presents to the clinic with concerns for recurrent hernia She states that she had 1 year of bilateral upper quadrant pain, worse with exertion that feels like a fist pushing out of her. It leaves her sore at both costal margins. She denies any nausea/vomiting or change in bowel habits associated with this. Recent imaging from October also reviewed which is concurrent with her symptoms and this revealed no evidence of pathology that would explain her symptoms.    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    TPIAT     Spasm of sphincter of Oddi        Past Surgical History: History in Epic reviewed with the patient:  Past Surgical History:   Procedure Laterality Date     ARTHROPLASTY CARPOMETACARPAL (THUMB JOINT)  5/2/2014    Procedure: ARTHROPLASTY CARPOMETACARPAL (THUMB JOINT);  Surgeon: Carina Panda MD;  Location: [facility]     CHOLECYSTECTOMY  2004     COLONOSCOPY  7/18/2014    Procedure: COLONOSCOPY;  Surgeon: Aurora Sahu MD;  Location:  GI     COLONOSCOPY N/A 8/1/2017    Procedure: COLONOSCOPY;  Colonoscopy and upper endoscopy;  Surgeon: Deirdre Harris MD;  [facility]: [facility]     ENDOSCOPIC RETROGRADE CHOLANGIOPANCREATOGRAM       ENDOSCOPIC RETROGRADE  CHOLANGIOPANCREATOGRAM  4/19/2011    Procedure:ENDOSCOPIC RETROGRADE CHOLANGIOPANCREATOGRAM; Pancreatic Stent Placement       ENDOSCOPIC RETROGRADE CHOLANGIOPANCREATOGRAM  5/26/2011    Procedure:ENDOSCOPIC RETROGRADE CHOLANGIOPANCREATOGRAM; with Pancreatic Stent Removal; Surgeon:DALE MIMS; Location:[facility]     ENDOSCOPY UPPER, COLONOSCOPY, COMBINED  4/25/2012    Procedure:COMBINED ENDOSCOPY UPPER, COLONOSCOPY; Enteroscopy with Bile Duct Stent Removal, Colonoscopy  *[facility] Safe Room*; Surgeon:GRACY GODWINIQ; Location[facility]     ESOPHAGOSCOPY, GASTROSCOPY, DUODENOSCOPY (EGD), COMBINED  5/26/2011    Procedure:COMBINED ESOPHAGOSCOPY, GASTROSCOPY, DUODENOSCOPY (EGD); Surgeon:DALE MIMS; [facility]     ESOPHAGOSCOPY, GASTROSCOPY, DUODENOSCOPY (EGD), COMBINED N/A 10/30/2014    Procedure: COMBINED ESOPHAGOSCOPY, GASTROSCOPY, DUODENOSCOPY (EGD), BIOPSY SINGLE OR MULTIPLE;  Surgeon: Sarai Moon MD;  [facility]     ESOPHAGOSCOPY, GASTROSCOPY, DUODENOSCOPY (EGD), COMBINED Left 7/6/2015    Procedure: COMBINED ESOPHAGOSCOPY, GASTROSCOPY, DUODENOSCOPY (EGD), BIOPSY SINGLE OR MULTIPLE;  Surgeon: Tohmas Estrada MD;  [facility]     ESOPHAGOSCOPY, GASTROSCOPY, DUODENOSCOPY (EGD), COMBINED N/A 7/8/2016    Procedure: COMBINED ESOPHAGOSCOPY, GASTROSCOPY, DUODENOSCOPY (EGD), BIOPSY SINGLE OR MULTIPLE;  Surgeon: Eloy Klein MD;  [facility]     ESOPHAGOSCOPY, GASTROSCOPY, DUODENOSCOPY (EGD), COMBINED N/A 8/4/2016    Procedure: COMBINED ESOPHAGOSCOPY, GASTROSCOPY, DUODENOSCOPY (EGD), BIOPSY SINGLE OR MULTIPLE;  Surgeon: Jason Brown MD;  [facility]     ESOPHAGOSCOPY, GASTROSCOPY, DUODENOSCOPY (EGD), COMBINED N/A 8/1/2017    Procedure: COMBINED ESOPHAGOSCOPY, GASTROSCOPY, DUODENOSCOPY (EGD);;  Surgeon: Deirdre Harris MD;  [facility]     ESOPHAGOSCOPY, GASTROSCOPY, DUODENOSCOPY (EGD), COMBINED N/A 6/12/2019    Procedure:  ESOPHAGOGASTRODUODENOSCOPY (EGD);  Surgeon: Jose Francisco Perdomo MD;  [facility]     GYN SURGERY      Hysterectomy and USO     [facility] ENDOSCOPY W EUS  7/20/2011    Procedure:COMBINED ENDOSCOPIC ULTRASOUND, ESOPHAGOSCOPY, GASTROSCOPY, DUODENOSCOPY (EGD); Surgeon:DARVIN DONOHUE; [facility]     HERNIORRHAPHY VENTRAL N/A 9/15/2016    Procedure: HERNIORRHAPHY VENTRAL;  Surgeon: Juanita Bernabe MD;  [facility]     HYSTERECTOMY  1997 or 1998    USO     INCISION AND DRAINAGE ABDOMEN WASHOUT, COMBINED  8/16/2012    Procedure: COMBINED INCISION AND DRAINAGE ABDOMEN WASHOUT;  ,debridement and Drainage Post Appendectomy;  Surgeon: Ron Austin MD;  [facility]     INJECT TRANSVERSUS ABDOMINIS PLANE (TAP) BLOCK BILATERAL Bilateral 5/26/2016    Procedure: INJECT TRANSVERSUS ABDOMINIS PLANE (TAP) BLOCK BILATERAL;  Surgeon: Leonard Mccallum MD;  [facility]     LAPAROSCOPIC APPENDECTOMY  7/30/2012    Procedure: LAPAROSCOPIC APPENDECTOMY;  Open Appendectomy;  Surgeon: Ron Austin MD;  [facility]     PANCREATECTOMY, TRANSPLANT AUTO ISLET CELL, COMBINED  1/6/2012    Procedure:COMBINED PANCREATECTOMY, TRANSPLANT AUTO ISLET CELL; Total  Pancreatectomy, Auto Islet Transplant, splenectomy, 18fr. transgastric-jejunal feeding tube placement, liver biopsy; Surgeon:PALAK LEE; [facility]     REPLACE GASTROSTOMY TUBE, PERCUTANEOUS N/A 8/30/2017    Procedure: REPLACE GASTROSTOMY TUBE, PERCUTANEOUS;  GJ Tube Change;  Surgeon: Jose Nath PA-C;  [facility]     SPLENECTOMY         Family History: History in Epic reviewed with the patient:  Family History   Problem Relation Age of Onset     Hypertension Mother      Diabetes Mother      Osteoporosis Mother      Cancer Father         pancreatic cancer     Diabetes Maternal Grandmother      Cardiovascular Maternal Grandmother      Cancer Maternal Grandfather         lung cancer     Cancer Sister         brain      Cancer Sister         liver cancer       Social History: History in Epic reviewed with the patient:  Social History     Socioeconomic History     Marital status:      Spouse name: Not on file     Number of children: Not on file     Years of education: Not on file     Highest education level: Not on file   Occupational History     Not on file   Tobacco Use     Smoking status: Never     Smokeless tobacco: Never   Substance and Sexual Activity     Alcohol use: No     Alcohol/week: 0.0 standard drinks     Drug use: No     Sexual activity: Yes   Other Topics Concern     Parent/sibling w/ CABG, MI or angioplasty before 65F 55M? Not Asked   Social History Narrative     Not on file     Social Determinants of Health     Financial Resource Strain: Not on file   Food Insecurity: Not on file   Transportation Needs: Not on file   Physical Activity: Not on file   Stress: Not on file   Social Connections: Not on file   Intimate Partner Violence: Not on file   Housing Stability: Not on file       Allergies:     Allergies   Allergen Reactions     Corticosteroids Other (See Comments)     All oral, IV and injectable steroids are contraindicated (unless in life threatening situations) in Islet Auto transplant recipients. They can cause irreversible loss of islet cell function. Please contact patient's transplant care coordinator YURI Cortez RN at [phone]/pager [phone] and/or endocrinologist prior to administration.       Chocolate Flavor Rash     Breaks out when eats chocolate       Outpatient Medications:  Outpatient Encounter Medications as of 2/10/2023   Medication Sig Dispense Refill     acetaminophen (TYLENOL) 325 MG tablet Take 3 tablets (975 mg) by mouth every 8 hours       alendronate (FOSAMAX) 70 MG tablet Take 1 tablet (70 mg) by mouth every 7 days On Sundays take first thing in the morning with plain water and remain upright for at least 30 minutes and until after first food of the day    Do not  restart Fosamax (alendronate) until your difficulty swallowing has resolved and you have finished the entire course of fluconazole (Diflucan). 4 tablet 0     alum & mag hydroxide-simethicone (MYLANTA/MAALOX) 200-200-25 MG CHEW chewable tablet Take 1 tablet by mouth 3 times daily as needed for indigestion       amylase-lipase-protease (CREON 12) 90655 units CPEP Take 6 to 8 capsules by mouth with meals and take 4 capsules with snacks. Needs up to 25 capsules per day 750 capsule 5     cephALEXin (KEFLEX) 500 MG capsule Take 1 capsule (500 mg) by mouth 2 times daily To complete a second course for a 14 day total of antibiotics. 14 capsule 0     Continuous Blood Gluc  (DEXCOM G6 ) RICARDO Use to read blood sugars as per 's instructions. 1 each 0     Continuous Blood Gluc Sensor (DEXCOM G6 SENSOR) MISC Change every 10 days. 9 each 3     Continuous Blood Gluc Transmit (DEXCOM G6 TRANSMITTER) MISC Change every 3 months. 1 each 3     CONTOUR NEXT TEST test strip USE TO TEST BLOOD SUGAR 6 TIMES DAILY OR AS DIRECTED 600 strip 1     cyclobenzaprine (FLEXERIL) 10 MG tablet Take 10 mg by mouth 3 times daily as needed for muscle spasms       cyclobenzaprine (FLEXERIL) 5 MG tablet Take 1 tablet (5 mg) by mouth 3 times daily as needed for muscle spasms 90 tablet 0     diclofenac (FLECTOR) 1.3 % Patch Place 1 patch onto the skin 2 times daily 30 each 1     diclofenac (VOLTAREN) 1 % GEL 2 g Apply 2 g to skin four times a day as needed (to affected upper extremity joint(s)). Maximum 8g/day per joint, 16g/day total.       dicyclomine (BENTYL) 10 MG capsule Take 10 mg by mouth every 6 hours       dronabinol (MARINOL) 2.5 MG capsule Take 2 capsules (5 mg) by mouth 2 times daily as needed 56 capsule 5     DULoxetine (CYMBALTA) 30 MG capsule Take 1 capsule (30 mg) by mouth daily With 60mg capsule for total dose of 90mg 90 capsule 0     DULoxetine (CYMBALTA) 60 MG EC capsule Take 1 capsule (60 mg) by mouth daily  With 30mg capsule for total dose of 90mg 90 capsule 1     famotidine (PEPCID) 20 MG tablet Take 1 tablet (20 mg) by mouth At Bedtime 30 tablet 0     hydrocortisone (CORTAID) 1 % external cream Apply topically 3 times daily 45 g 3     hydrocortisone (CORTEF) 10 MG tablet Take 10 mg in the morning and 10 mg along with a 5 mg tablet at bedtime for a total dose of 15 mg at bedtime. Watch for hypoglycemia recurrence.       hydrocortisone (CORTEF) 5 MG tablet Take 5 mg by mouth At Bedtime along with a 10 mg tablet for a total dose of 15 mg       Injection Device for insulin (NOVOPEN ECHO) RICARDO Use with   Insulin 1 each 0     insulin aspart (NOVOLOG VIAL) 100 UNITS/ML vial Via insulin pump. Approx 60 units daily. 60 mL 1     insulin aspart (NOVOLOG VIAL) 100 UNITS/ML vial USE TO FILL INSULIN PUMP RESERVOIR. NEEDS 150 UNITS EVERY 3 DAYS. CALL [facility] TO SCHEDULE FOLLOW UP APPOINTMENT 10 mL 1     insulin aspart (NOVOPEN ECHO) 100 UNIT/ML cartridge As  instructed per physician 5 mL 0     Insulin Disposable Pump (OMNIPOD 5 G6 INTRO, GEN 5,) KIT 1 each See Admin Instructions Use continuously to infuse insulin. 1 kit 0     Insulin Disposable Pump (OMNIPOD 5 G6 POD, GEN 5,) MISC 1 each See Admin Instructions Change pod every 2-3 days. 35 each 3     levothyroxine (SYNTHROID/LEVOTHROID) 112 MCG tablet Take 1 tablet (112 mcg) by mouth daily 90 tablet 3     linaclotide (LINZESS) 145 MCG capsule Take 145 mcg by mouth every morning (before breakfast) as needed       metoclopramide (REGLAN) 5 MG tablet Take 2 tablets (10 mg) by mouth 3 times daily 180 tablet 3     Nutritional Supplements (BOOST HIGH PROTEIN) LIQD After above baseline labs are drawn, give: 6 mL/kg to maximum of 360 mL; the beverage is to be consumed within 5 minutes. (Patient taking differently: as needed After above baseline labs are drawn, give: 6 mL/kg to maximum of 360 mL; the beverage is to be consumed within 5 minutes.)  0     ondansetron (ZOFRAN) 4 MG tablet Take 1  tablet (4 mg) by mouth every 8 hours as needed for nausea 30 tablet 0     ondansetron (ZOFRAN-ODT) 4 MG ODT tab DISSOLVE ONE TABLET ON THE TONGUE EVERY 6 HOURS AS NEEDED FOR NAUSEA AND VOMITING 60 tablet 2     order for DME by [employer] [address] [place], [place]  Ph: [phone]  Fax: [phone]    Nutren 1.5 @ 10 ml/hr with advancement by 10 ml/hr q 8 hours to goal rate of 40 ml/hr.  This will provide 1440 kcals (27 kcal/kg/day), 65 g PRO (1.2 g/kg/day), 730 mL H2O, 169 g CHO and no Fiber daily.    1 Month 3     pantoprazole (PROTONIX) 40 MG EC tablet Take 1 tablet (40 mg) by mouth 2 times daily 180 tablet 3     polyethylene glycol (MIRALAX/GLYCOLAX) packet Take 1 packet by mouth 2 times daily as needed for constipation  14 each 5     potassium chloride ER (KLOR-CON M) 20 MEQ CR tablet Take 1 tablet (20 mEq) by mouth daily 90 tablet 1     senna-docusate (SENOKOT-S/PERICOLACE) 8.6-50 MG tablet Take 1-2 tablets by mouth daily as needed for constipation 60 tablet 3     sodium chloride (OCEAN) 0.65 % nasal spray Spray 1 spray into both nostrils daily as needed for congestion 30 mL 0     sucralfate (CARAFATE) 1 GM tablet Take 1 tablet (1 g) by mouth 4 times daily (before meals and nightly) 30 tablet 0     SUMAtriptan (IMITREX) 50 MG tablet Take 1 tablet (50 mg) by mouth at onset of headache for migraine Take 1 Tab by mouth Once as needed for Migraine Headache. May repeat after two hours.  Maximum dose 200 mg/24 hours. 30 tablet 1     topiramate (TOPAMAX) 100 MG tablet Take 1 tablet (100 mg) by mouth 2 times daily 180 tablet 1     traMADol (ULTRAM) 50 MG tablet Take 0.5-1 tablets (25-50 mg) by mouth every 6 hours as needed for moderate pain 15 tablet 0     traZODone (DESYREL) 100 MG tablet TAKE 1-2 TABLETS BY MOUTH AN HOUR BEFORE BEDTIME 100 tablet 2     No facility-administered encounter medications on file as of 2/10/2023.         ROS: 10 systems reviewed and all are negative except as  "above.    EXAM:  /79 (BP Location: Right arm, Patient Position: Sitting, Cuff Size: Adult Regular)   Pulse 89   Ht 1.575 m (5' 2\")   Wt 59.2 kg (130 lb 8 oz)   SpO2 98%   BMI 23.87 kg/m    Psych: Normal affect  Neuro: No gross focal deficits noted  Head:Normocephalic, atraumatic  Eyes: Non icteric  Neck:supple  Heart: Regular rate and rhythm  Lungs: non-labored, quiet respiration  Abdomen: soft, TTP in bilateral upper quadrants, less so in RLQ, no hernias palpated  Extremities: No obvious deformities    Imaging Data (I have personally reviewed the following reports):  No results found for this or any previous visit (from the past 744 hour(s)).    A/P: Chantell Kidd is a 57 year old female with chronic abdominal pain presenting for concerns for hernia    -no evidence of hernia on exam or on review of recent imaging, no indication for surgical intervention  -discussed with her the importance of better glucose control moving forward as this would be a barrier to any elective procedure in the future      Chang Gutierrez MD    Answers for HPI/ROS submitted by the patient on 2/10/2023  General Symptoms: Yes  Skin Symptoms: No  HENT Symptoms: No  EYE SYMPTOMS: Yes  HEART SYMPTOMS: Yes  LUNG SYMPTOMS: Yes  INTESTINAL SYMPTOMS: Yes  URINARY SYMPTOMS: Yes  GYNECOLOGIC SYMPTOMS: Yes  BREAST SYMPTOMS: Yes  SKELETAL SYMPTOMS: Yes  BLOOD SYMPTOMS: Yes  NERVOUS SYSTEM SYMPTOMS: Yes  MENTAL HEALTH SYMPTOMS: Yes  Fever: Yes  Loss of appetite: Yes  Weight loss: Yes  Weight gain: No  Fatigue: Yes  Night sweats: Yes  Chills: No  Increased stress: Yes  Excessive hunger: No  Excessive thirst: Yes  Feeling hot or cold when others believe the temperature is normal: Yes  Loss of height: No  Post-operative complications: Yes  Surgical site pain: No  Hallucinations: No  Change in or Loss of Energy: Yes  Hyperactivity: Yes  Confusion: No  Eye pain: Yes  Vision loss: Yes  Dry eyes: No  Watery eyes: Yes  Eye bulging: No  Double vision: " No  Flashing of lights: Yes  Spots: Yes  Floaters: Yes  Redness: Yes  Crossed eyes: No  Tunnel Vision: No  Yellowing of eyes: No  Eye irritation: Yes  Chest pain or pressure: No  Fast or irregular heartbeat: No  Pain in legs with walking: Yes  Trouble breathing while lying down: No  Fingers or toes appear blue: No  High blood pressure: No  Low blood pressure: No  Fainting: Yes  Murmurs: No  Pacemaker: No  Varicose veins: No  Wake up at night with shortness of breath: No  Light-headedness: Yes  Exercise intolerance: Yes  Cough: Yes  Sputum or phlegm: No  Coughing up blood: No  Difficulty breating or shortness of breath: No  Heart burn or indigestion: Yes  Nausea: Yes  Vomiting: Yes  Abdominal pain: Yes  Bloating: Yes  Constipation: Yes  Diarrhea: Yes  Blood in stool: Yes  Black stools: Yes  Rectal or Anal pain: Yes  Fecal incontinence: No  Yellowing of skin or eyes: No  Vomit with blood: Yes  Change in stools: No  Trouble holding urine or incontinence: Yes  Pain or burning: Yes  Trouble starting or stopping: Yes  Increased frequency of urination: Yes  Blood in urine: Yes  Decreased frequency of urination: No  Frequent nighttime urination: Yes  Flank pain: Yes  Difficulty emptying bladder: Yes  Bleeding or spotting between periods: No  Heavy or painful periods: No  Irregular periods: No  Vaginal discharge: Yes  Hot flashes: Yes  Vaginal dryness: No  Genital ulcers: No  Reduced libido: No  Painful intercourse: Yes  Difficulty with sexual arousal: Yes  Post-menopausal bleeding: No  Discharge: No  Lumps: No  Pain: Yes  Nipple retraction: No  Back pain: Yes  Muscle aches: Yes  Neck pain: Yes  Swollen joints: Yes  Joint pain: Yes  Bone pain: Yes  Muscle cramps: Yes  Muscle weakness: Yes  Joint stiffness: Yes  Bone fracture: No  Edema or swelling: Yes  Anemia: Yes  Swollen glands: No  Easy bleeding or bruising: Yes  Trouble with coordination: Yes  Dizziness or trouble with balance: Yes  Fainting or black-out spells:  Yes  Memory loss: No  Headache: Yes  Seizures: No  Speech problems: No  Tingling: Yes  Tremor: Yes  Weakness: Yes  Difficulty walking: Yes  Paralysis: No  Numbness: Yes  Nervous or Anxious: Yes  Depression: Yes  Trouble sleeping: Yes  Trouble thinking or concentrating: Yes  Mood changes: Yes  Panic attacks: Yes

## 2023-02-10 NOTE — NURSING NOTE
"Chief Complaint   Patient presents with     New Patient     Ventral hernia       Vitals:    02/10/23 0803   BP: 135/79   BP Location: Right arm   Patient Position: Sitting   Cuff Size: Adult Regular   Pulse: 89   SpO2: 98%   Weight: 59.2 kg (130 lb 8 oz)   Height: 1.575 m (5' 2\")       Body mass index is 23.87 kg/m .                          Bernabe Hart EMT    "

## 2023-02-10 NOTE — PATIENT INSTRUCTIONS
You met with Dr. Chang Gutierrez.      Today's visit instructions:    Return to the Surgery Clinic on an as needed basis. No hernia was identified today.     Please work with your Endocrine Team to decrease your A1c.    If you have questions please contact Chantell RN or Em RN during regular clinic hours, Monday through Friday 7:30 AM - 4:00 PM, or you can contact us via [employer] at anytime.       If you have urgent needs after-hours, weekends, or holidays please call the hospital at [phone] and ask to speak with our on-call General Surgery Team.    Appointment [phone]  Nurse Advice (Chantell or Em): [phone]   Surgery Scheduler (Christine): [phone]  Fax: [phone]

## 2023-02-10 NOTE — TELEPHONE ENCOUNTER
REFERRAL INFORMATION:    Referring Provider:  Kiki Ardon MD    Referring Clinic:  [facility]    Reason for Visit/Diagnosis: **Ventral hernia. 9/15/16 OP S[facility].       FUTURE VISIT INFORMATION:    Appointment Date: 2/10/23    Appointment Time: 8AM     NOTES RECORD STATUS  DETAILS   OFFICE NOTE from Referring Provider Internal  2/7/23 OV and referral    OFFICE NOTE from Other Specialists Internal 7/2/19 OV Keny Barker MD   OPERATIVE REPORT Internal 8/1/17 - colonoscopy   4/25/12 - Small bowel enteroscopy  7/20/11 - Upper EUS       ENDOSCOPY (EGD)  Internal 6/10/19 - Epic    IMAGING (CT, MRI, US, XR)  Internal CT ABD PELIVS: 10/10/22, 11/8/2020  XR UPPER GI : 6/13/19  XR ESOPHAGRAM: 11/17/16  NM GATRIC : 3/30/16  MR ABD MRCP: 4/1/15  US ABD: 9/19/14

## 2023-02-16 ENCOUNTER — ANCILLARY PROCEDURE (OUTPATIENT)
Dept: MRI IMAGING | Facility: CLINIC | Age: 58
End: 2023-02-16
Attending: INTERNAL MEDICINE
Payer: COMMERCIAL

## 2023-02-16 ENCOUNTER — TELEPHONE (OUTPATIENT)
Dept: NEUROSURGERY | Facility: CLINIC | Age: 58
End: 2023-02-16

## 2023-02-16 DIAGNOSIS — M54.41 ACUTE BILATERAL LOW BACK PAIN WITH RIGHT-SIDED SCIATICA: ICD-10-CM

## 2023-02-16 PROCEDURE — 72158 MRI LUMBAR SPINE W/O & W/DYE: CPT | Performed by: STUDENT IN AN ORGANIZED HEALTH CARE EDUCATION/TRAINING PROGRAM

## 2023-02-16 PROCEDURE — A9585 GADOBUTROL INJECTION: HCPCS | Performed by: STUDENT IN AN ORGANIZED HEALTH CARE EDUCATION/TRAINING PROGRAM

## 2023-02-16 RX ORDER — GADOBUTROL 604.72 MG/ML
7.5 INJECTION INTRAVENOUS ONCE
Status: COMPLETED | OUTPATIENT
Start: 2023-02-16 | End: 2023-02-16

## 2023-02-16 RX ADMIN — GADOBUTROL 6 ML: 604.72 INJECTION INTRAVENOUS at 07:43

## 2023-02-16 NOTE — TELEPHONE ENCOUNTER
SPINE PATIENTS - NEW PROTOCOL PREVISIT    RECORDS RECEIVED FROM: Internal   REASON FOR VISIT: acute YANE low back pian   Date of Appt: 02/23/2023   NOTES (FOR ALL VISITS) STATUS DETAILS   OFFICE NOTE from referring provider Internal 02/07/2023 Dr Dougherty [facility]    OFFICE NOTE from other specialist N/A    DISCHARGE SUMMARY from hospital N/A    DISCHARGE REPORT from ER N/A    EMG REPORT N/A    MEDICATION LIST N/A    IMAGING  (FOR ALL VISITS)     MRI (HEAD, NECK, SPINE) Internal 02/16/2023 lumbar spine   XRAY (SPINE) *NEUROSURGERY* N/A    CT (HEAD, NECK, SPINE) N/A

## 2023-02-16 NOTE — TELEPHONE ENCOUNTER
Pt has spine red flags and is schedule with Dr. Mo on 2/23. Pt has increase in bladder but feels like when she goes to the bathroom her bladder isn't 'empty' all the way. Numbness and tingling down both legs and genital area. Please advise if pt needs to be seen sooner then schedule appt.

## 2023-02-16 NOTE — DISCHARGE INSTRUCTIONS
MRI Contrast Discharge Instructions    The IV contrast you received today will pass out of your body in your  urine. This will happen in the next 24 hours. You will not feel this process.  Your urine will not change color.    Drink at least 4 extra glasses of water or juice today (unless your doctor  has restricted your fluids). This reduces the stress on your kidneys.  You may take your regular medicines.    If you are on dialysis: It is best to have dialysis today.    If you have a reaction: Most reactions happen right away. If you have  any new symptoms after leaving the hospital (such as hives or swelling),  call your hospital at the correct number below. Or call your family doctor.  If you have breathing distress or wheezing, call 911.    Special instructions: ***    I have read and understand the above information.    Signature:______________________________________ Date:___________    Staff:__________________________________________ Date:___________     Time:__________    [facility] Departments:    ___[place]: [phone]  ___[facility]: [phone]  ___[place]: [phone] ___[facility]: [phone]  ___[facility]: [phone]  ___[facility]: [phone]  ___[phone]  ___[facility]: [phone]  ___[phone]

## 2023-02-16 NOTE — TELEPHONE ENCOUNTER
INSULIN ASPART 100UNIT/ML SOLN      Last Written Prescription Date:  11-6-2021  Last Fill Quantity: 10 ML,   # refills: 1  Last Office Visit : 11-  Future Office visit:  NONE    Routing refill request to provider for review/approval because:  Labs are ALL overdue  Who should be managing her insulin?    Has not actually seen Dr Morgan since 2019.  Per last clinic note with Dr Robles:  Post pancreatectomy diabetes  She notes adequate control of blood sugars following hospital discharge.  She says blood sugars have been shared with primary endocrinology clinic.    Should ENDOCRINE be managing? But I do not see who that is.  She also appears to have a PCP at [facility]:   Primary Care Provider Other Service Providers Document Coverage Dates   SHANTHI Acosta (May 30, 2012May 30, 2012 - Present)   Physician Assistant  [facility] [place] [address]  [facility]           Kathleen M Doege RN         DEPRESSIVE SYMPTOMS DEPRESSIVE SYMPTOMS

## 2023-02-17 NOTE — TELEPHONE ENCOUNTER
Last Visit: na    Next Visit: 2/23/23    Name of Provider:  Dr Mo    History: PCP orders spine consult for 2 week hx of lower back pain radiating to legs. Denied red flag sx at that time.   Ortho  documents patient reports numbness/tingling in legs/genitals and unable to empty bladder asking if ok to wait for appt next week.   Hx of herniated disc, Unchanged on 2/16 MRI     Assessment: Patient states he symptoms have gotten worse since she saw her PCP back on 12/7. She confirms numbness/tinging in legs and groin. And reports new urinary incontinence     Recommendation given: Due to new red flag symptoms, advised to seek urgent evaluation. She wishes to go the [facility]. Explained that our particular [facility] not [facility]   She is a new patient so encouraged her to seek care where she feels comfortable  She wishes to keep her appointment with Dr Mo as is for now    Action needed from provider: jessica

## 2023-02-21 DIAGNOSIS — M54.41 ACUTE BILATERAL LOW BACK PAIN WITH RIGHT-SIDED SCIATICA: Primary | ICD-10-CM

## 2023-02-21 NOTE — Clinical Note
Yobani De Leon,   Can you schedule this scoli and lumbar Xray on the same day prior to their appt with Dr. Mo on Thursday 2/23/23? Please notify the patient to arrive 30 min earlier to have these completed. Orders placed. Thanks!  Nazanin

## 2023-02-22 ENCOUNTER — TELEPHONE (OUTPATIENT)
Dept: NEUROSURGERY | Facility: CLINIC | Age: 58
End: 2023-02-22
Payer: COMMERCIAL

## 2023-02-22 NOTE — TELEPHONE ENCOUNTER
Action    Action Taken Per patient, pain has been present for about 6 months. No injury or accident. Feels like it locks up. Never been seen, no imaging         DIAGNOSIS: bilat knee pain   APPOINTMENT DATE: 03/01/2023   NOTES STATUS DETAILS   OFFICE NOTE from referring provider N/A    OFFICE NOTE from other specialist N/A    DISCHARGE SUMMARY from hospital N/A    DISCHARGE REPORT from the ER N/A    OPERATIVE REPORT N/A    EMG report N/A    MEDICATION LIST N/A    MRI N/A    DEXA (osteoporosis/bone health) N/A    CT SCAN N/A    XRAYS (IMAGES & REPORTS) N/A

## 2023-02-22 NOTE — TELEPHONE ENCOUNTER
Pt was contacted regarding appt with neurosurgeon Dr. Mo tomorrow. She was informed that provider reviewed her chart and images and did not see surgical indication at this time. He would recommend pt see medical spine provider and writer offered appt with Dr. Yanez or Dr. Lloyd at . Pt verbalized understanding and is OK with cancelling the appt with Dr. Mo. She would like to think about this more and call back to schedule at a later time. She is thinking about reaching out the orthopedics/sports med dept as she is also having aches and pain in her knees. She has  scheduling number and will call back at later time.       Nazanin Leon, RNCC  Neurology/Neurosurgery

## 2023-02-23 ENCOUNTER — PRE VISIT (OUTPATIENT)
Dept: NEUROSURGERY | Facility: CLINIC | Age: 58
End: 2023-02-23

## 2023-02-24 DIAGNOSIS — M25.562 BILATERAL KNEE PAIN: Primary | ICD-10-CM

## 2023-02-24 DIAGNOSIS — M25.561 BILATERAL KNEE PAIN: Primary | ICD-10-CM

## 2023-03-01 ENCOUNTER — ANCILLARY PROCEDURE (OUTPATIENT)
Dept: GENERAL RADIOLOGY | Facility: CLINIC | Age: 58
End: 2023-03-01
Attending: FAMILY MEDICINE
Payer: COMMERCIAL

## 2023-03-01 ENCOUNTER — OFFICE VISIT (OUTPATIENT)
Dept: ORTHOPEDICS | Facility: CLINIC | Age: 58
End: 2023-03-01
Payer: COMMERCIAL

## 2023-03-01 ENCOUNTER — PRE VISIT (OUTPATIENT)
Dept: ORTHOPEDICS | Facility: CLINIC | Age: 58
End: 2023-03-01

## 2023-03-01 DIAGNOSIS — M25.561 BILATERAL KNEE PAIN: ICD-10-CM

## 2023-03-01 DIAGNOSIS — G89.29 CHRONIC PAIN OF BOTH KNEES: Primary | ICD-10-CM

## 2023-03-01 DIAGNOSIS — M25.561 CHRONIC PAIN OF BOTH KNEES: Primary | ICD-10-CM

## 2023-03-01 DIAGNOSIS — M25.562 BILATERAL KNEE PAIN: ICD-10-CM

## 2023-03-01 DIAGNOSIS — M17.0 PRIMARY OSTEOARTHRITIS OF BOTH KNEES: ICD-10-CM

## 2023-03-01 DIAGNOSIS — M25.562 CHRONIC PAIN OF BOTH KNEES: Primary | ICD-10-CM

## 2023-03-01 PROCEDURE — 73564 X-RAY EXAM KNEE 4 OR MORE: CPT | Mod: RT | Performed by: RADIOLOGY

## 2023-03-01 PROCEDURE — 99204 OFFICE O/P NEW MOD 45 MIN: CPT | Performed by: FAMILY MEDICINE

## 2023-03-01 NOTE — LETTER
3/1/2023         RE: Chantell Kidd  [address]  [facility] [address]        Dear Colleague,    Thank you for referring your patient, Chantell Kidd, to the [facility] [place]. Please see a copy of my visit note below.      [facility]  SPORTS MEDICINE CLINIC VISIT     Mar 1, 2023        ASSESSMENT & PLAN    57-year-old with bilateral knee pain likely due to combination of patellofemoral OA and patellar tendinopathy    Reviewed imaging and assessment with patient in detail  We discussed steroid injections however due to her current labile blood sugars we will avoid steroid injections at this time.  Instead we will pursue approval for viscosupplementation.  We will contact her if this is approved.  Otherwise when her sugars stabilize we could consider steroid injection in the future.  Otherwise we discussed use of patellar cutout knee sleeve as needed for comfort  She was provided with home exercises and a referral to physical therapy      Durga Kitchen MD  [facility]    -----  Chief Complaint   Patient presents with     Consult For     Bilateral knee pain       SUBJECTIVE  Chantell Kidd is a/an 57 year old female who is seen in consultation at the request of  No ref. provider found  M.D. for evaluation of bilateral knee pain.     The patient is seen by themselves.  The patient is Right handed    Onset: November 2022. Reports insidious onset without acute precipitating event. Did have a fall in July where she passed out due to low blood sugars and had scraped on hand.   Location of Pain: bilateral knee - around patella  Worsened by: Going up and down stairs  Better with: Nothing  Treatments tried: no treatment tried to date  Associated symptoms: locking or catching, instability    Orthopedic/Surgical history: NO  Social History/Occupation:       REVIEW OF SYSTEMS:    Do you have fever, chills, weight  loss? No    Do you have any vision problems? No    Do you have any chest pain or edema? No    Do you have any shortness of breath or wheezing?  No    Do you have stomach problems? No    Do you have any numbness or focal weakness? No    Do you have diabetes? Yes, insulin    Do you have problems with bleeding or clotting? No    Do you have an rashes or other skin lesions? No    OBJECTIVE:  There were no vitals taken for this visit.     Patient is alert, No acute distress, pleasant and conversational.    bilateral knee:   Skin intact. No erythema or ecchymosis.  No effusion or soft tissue swelling.    AROM: Zero to approximately 135  without restriction or reported pain. Crepitus in anterior knee    Palpation: No medial or lateral facet joint tenderness.  No posterior medial or posterior lateral joint line tenderness   ttp over patellar tendon bilaterally    Special Tests:  Negative bounce test, negative forced flexion and negative Saw's.  No ligamentous laxity or pain with valgus or varus stress.  Negative Lachman's, Anterior Drawer and Posterior Drawer     Full Isometric quad strength, extensor mechanism in place     Neurovascularly intact in the lower extremity    Hip and Ankle with full AROM and nontender      RADIOLOGY:    4 view xrays of bilateral knees performed and reviewed independently demonstrating mild narrowing in medial compartment. No acute findings. See EMR for formal radiology report.                 Again, thank you for allowing me to participate in the care of your patient.        Sincerely,        Durga Kitchen MD

## 2023-03-01 NOTE — PATIENT INSTRUCTIONS
-Try using patellar cutout knee sleeve as pictured below  -Try home exercises as long as you can do them comfortably  -Follow-up with physical therapy  -Contact our office if you would like to try a steroid injection for the knee as your blood sugars stabilize  -Otherwise, we will contact you if you are approved to have a viscosupplementation injection          Chondromalacia / Patellofemoral Pain    CHONDROMALACIA PATELLAE:  -Pain in the front of your knee due to increased pressure from the knee cap (patella)  -There are many causes including trauma, history of dislocation or subluxation of knee cap but most often it is due to an imbalance of the thigh muscles or weak core muscles which cause irregular tracking of your kneecap on your thigh bone.  -People whose feet pronate (roll in) increase the outward pulling on the kneecap as well    SIGNS AND SYMPTOMS:  -Diffuse knee pain that worsens with stairs, squatting, prolonged sitting, jumping, and similar movements.  -Pain may be achy or sharp  -Stiffness with prolonged sitting  -Occasionally, giving way of the knee, grinding or a catching sensation    TREATMENT:  -regular exercise (biking, swimming, or elliptical are good for cardio)  -weight lifting/plyometrics are helpful but remember to keep your knees behind your toes (don't bend knee more than 90degrees)  -regular core exercises (yoga and pilates)  -arch supports may help during exercise until hips stronger to prevent ankle pronation  *over the counter semi-rigid brands include power step arch supports may be purchased at specialty shoe stores, [employer] or internet  *custom made orthotics may be ordered by your physician if needed for prolonged treatment  -Stretching and strengthening therapy  -Ice 10-15 minutes after activity. (Ice cups for massage 5-7min)  -Ice and NSAIDs help decrease inflammation. A good anti-inflammatory NSAID medication is Alleve (220mg). Take 1-2 tabs twice daily with food  until pain improves or minimum of 14 days, then as needed for pain. (1-2 tabs twice daily dosing is for patients over 12 years old. Warren than 11 yo, check dose with doctor.)  -often component of hip weakness that leads to lower extremity (foot, ankle, shin, and knee) problems so a lot of focus will be on core strength and balance  - recommend yoga for core strengthening and stretching  -Perform exercises as instructed through handout or formal therapy if doing. Until then start with the following:  -Ankle strengthening  1) balance on one foot 1-2 min daily  2) once able to balance for 1 minute, start hip strengthening with 4 way motion with straight leg. Tie theraband around ankle and balance on other foot while doing15 reps each direction of straight leg  motion  3) arch raises- tighten bottom of foot and hold x 3-5 sec, repeat 5 times  4) ankle exercises (4 way with theraband)- 3 sets of 10-15 (fatigue) daily  -usually takes several weeks before pain free but longer before return to full activity without pain  --Once released to increase activity, BE PATIENT!    Return to activity guidelines include:  If it hurts, please avoid activity  Start gradually and build up, wait 24 hours before increase intensity and time  Runnin min on treadmill (or somewhere you can get home easily from if you have to stop), start walk 4 min/run 1 min and Repeat 3 times. If pain free for 48 hours, increase to walk 3 min/run 2 min. Continue to increase as such as pain allows. If you develop pain, back off to previous pain-free level and wait 1 week before increasing again.  Follow-up in 6 weeks if not improved or sooner if further concern.  If problem flares again after resolved, restart icing, and exercises.      Standing hamstring stretch: Put the heel of the leg on your injured side on a stool about 15 inches high. Keep your leg straight. Lean forward, bending at the hips, until you feel a mild stretch in the back of your thigh.  Make sure you don't roll your shoulders or bend at the waist when doing this or you will stretch your lower back instead of your leg. Hold the stretch for 15 to 30 seconds. Repeat 3 times.    Quadriceps stretch: Stand at an arm's length away from the wall with your injured side farthest from the wall. Facing straight ahead, brace yourself by keeping one hand against the wall. With your other hand, grasp the ankle on your injured side and pull your heel toward your buttocks. Don't arch or twist your back. Keep your knees together. Hold this stretch for 15 to 30 seconds.    Side-lying leg lift: Lie on your uninjured side. Tighten the front thigh muscles on your injured leg and lift that leg 8 to 10 inches (20 to 25 centimeters) away from the other leg. Keep the leg straight and lower it slowly. Do 2 sets of 15.    Quad sets: Sit on the floor with your injured leg straight and your other leg bent. Press the back of the knee of your injured leg against the floor by tightening the muscles on the top of your thigh. Hold this position 10 seconds. Relax. Do 2 sets of 15.  Straight leg raise: Lie on your back with your legs straight out in front of you. Bend the knee on your uninjured side and place the foot flat on the floor. Tighten the thigh muscle on your injured side and lift your leg about 8 inches off the floor. Keep your leg straight and your thigh muscle tight. Slowly lower your leg back down to the floor. Do 2 sets of 15.    Clam exercise: Lie on your uninjured side with your hips and knees bent and feet together. Slowly raise your top leg toward the ceiling while keeping your heels touching each other. Hold for 2 seconds and lower slowly. Do 2 sets of 15 repetitions.    Wall squat with a ball: Stand with your back, shoulders, and head against a wall. Look straight ahead. Keep your shoulders relaxed and your feet 3 feet (90 centimeters) from the wall and shoulder's width apart. Place a soccer or basketball-sized  ball behind your back. Keeping your back against the wall, slowly squat down to a 45-degree angle. Your thighs will not yet be parallel to the floor. Hold this position for 10 seconds and then slowly slide back up the wall. Repeat 10 times. Build up to 2 sets of 15.    Knee stabilization: Wrap a piece of elastic tubing around the ankle of your uninjured leg. Tie a knot in the other end of the tubing and close it in a door at about ankle height.  Stand facing the door on the leg without tubing (your injured leg) and bend your knee slightly, keeping your thigh muscles tight. Stay in this position while you move the leg with the tubing (the uninjured leg) straight back behind you. Do 2 sets of 15.  Turn 90 degrees so the leg without tubing is closest to the door. Move the leg with tubing away from your body. Do 2 sets of 15.  Turn 90 degrees again so your back is to the door. Move the leg with tubing straight out in front of you. Do 2 sets of 15.  Turn your body 90 degrees again so the leg with tubing is closest to the door. Move the leg with tubing across your body. Do 2 sets of 15.  Hold onto a chair if you need help balancing. This exercise can be made more challenging by standing on a firm pillow or foam mat while you move the leg with tubing.    Resisted terminal knee extension: Make a loop with a piece of elastic tubing by tying a knot in both ends. Close the knot in a door at knee height. Step into the loop with your injured leg so the tubing is around the back of your knee. Lift the other foot off the ground and hold onto a chair for balance, if needed. Bend the knee with tubing about 45 degrees. Slowly straighten your leg, keeping your thigh muscle tight as you do this. Repeat 15 times. Do 2 sets of 15. If you need an easier way to do this, stand on both legs for better support while you do the exercise.    Standing calf stretch: Stand facing a wall with your hands on the wall at about eye level. Keep your  injured leg back with your heel on the floor. Keep the other leg forward with the knee bent. Turn your back foot slightly inward (as if you were pigeon-toed). Slowly lean into the wall until you feel a stretch in the back of your calf. Hold the stretch for 15 to 30 seconds. Return to the starting position. Repeat 3 times. Do this exercise several times each day.    Step-up: Stand with the foot of your injured leg on a support 3 to 5 inches (8 to 13 centimeters) high --like a small step or block of wood. Keep your other foot flat on the floor. Shift your weight onto the injured leg on the support. Straighten your injured leg as the other leg comes off the floor. Return to the starting position by bending your injured leg and slowly lowering your uninjured leg back to the floor. Do 2 sets of 15.    Iliotibial band stretch, side-bending: Cross one leg in front of the other leg and lean in the opposite direction from the front leg. Reach the arm on the side of the back leg over your head while you do this. Hold this position for 15 to 30 seconds. Return to the starting position. Repeat 3 times and then switch legs and repeat the exercise.  Developed by [employer].  Published by [employer].  Copyright  2014 [employer] and/or one of its subsidiaries. All rights reserved.

## 2023-03-02 ENCOUNTER — OFFICE VISIT (OUTPATIENT)
Dept: TRANSPLANT | Facility: CLINIC | Age: 58
End: 2023-03-02
Attending: PEDIATRICS
Payer: COMMERCIAL

## 2023-03-02 ENCOUNTER — LAB (OUTPATIENT)
Dept: LAB | Facility: CLINIC | Age: 58
End: 2023-03-02
Payer: COMMERCIAL

## 2023-03-02 VITALS
WEIGHT: 131.4 LBS | BODY MASS INDEX: 24.18 KG/M2 | DIASTOLIC BLOOD PRESSURE: 73 MMHG | SYSTOLIC BLOOD PRESSURE: 118 MMHG | HEART RATE: 94 BPM | HEIGHT: 62 IN

## 2023-03-02 DIAGNOSIS — Z90.410 POST-PANCREATECTOMY DIABETES (H): ICD-10-CM

## 2023-03-02 DIAGNOSIS — E10.649 TYPE 1 DIABETES MELLITUS WITH HYPOGLYCEMIA AND WITHOUT COMA (H): ICD-10-CM

## 2023-03-02 DIAGNOSIS — E10.649 TYPE 1 DIABETES MELLITUS WITH HYPOGLYCEMIA AND WITHOUT COMA (H): Primary | ICD-10-CM

## 2023-03-02 DIAGNOSIS — E27.40 ADRENAL INSUFFICIENCY (H): ICD-10-CM

## 2023-03-02 DIAGNOSIS — R10.9 ABDOMINAL PAIN: ICD-10-CM

## 2023-03-02 DIAGNOSIS — E89.1 POST-PANCREATECTOMY DIABETES (H): ICD-10-CM

## 2023-03-02 DIAGNOSIS — E13.9 POST-PANCREATECTOMY DIABETES (H): ICD-10-CM

## 2023-03-02 LAB
ALBUMIN SERPL BCG-MCNC: 4.2 G/DL (ref 3.5–5.2)
ALP SERPL-CCNC: 106 U/L (ref 35–104)
ALT SERPL W P-5'-P-CCNC: 43 U/L (ref 10–35)
ANION GAP SERPL CALCULATED.3IONS-SCNC: 12 MMOL/L (ref 7–15)
AST SERPL W P-5'-P-CCNC: 67 U/L (ref 10–35)
BASOPHILS # BLD AUTO: 0.1 10E3/UL (ref 0–0.2)
BASOPHILS NFR BLD AUTO: 1 %
BILIRUB SERPL-MCNC: 0.3 MG/DL
BUN SERPL-MCNC: 7.6 MG/DL (ref 8–23)
CALCIUM SERPL-MCNC: 9.2 MG/DL (ref 8.6–10)
CHLORIDE SERPL-SCNC: 100 MMOL/L (ref 98–107)
CHOLEST SERPL-MCNC: 178 MG/DL
CREAT SERPL-MCNC: 0.8 MG/DL (ref 0.51–0.95)
DEPRECATED HCO3 PLAS-SCNC: 23 MMOL/L (ref 22–29)
EOSINOPHIL # BLD AUTO: 0.1 10E3/UL (ref 0–0.7)
EOSINOPHIL NFR BLD AUTO: 2 %
ERYTHROCYTE [DISTWIDTH] IN BLOOD BY AUTOMATED COUNT: 12 % (ref 10–15)
FERRITIN SERPL-MCNC: 91 NG/ML (ref 11–328)
GFR SERPL CREATININE-BSD FRML MDRD: 84 ML/MIN/1.73M2
GLUCOSE SERPL-MCNC: 387 MG/DL (ref 70–99)
HBA1C MFR BLD: 11.1 %
HCT VFR BLD AUTO: 36.6 % (ref 35–47)
HDLC SERPL-MCNC: 106 MG/DL
HGB BLD-MCNC: 12.5 G/DL (ref 11.7–15.7)
IMM GRANULOCYTES # BLD: 0 10E3/UL
IMM GRANULOCYTES NFR BLD: 0 %
IRON BINDING CAPACITY (ROCHE): 303 UG/DL (ref 240–430)
IRON SATN MFR SERPL: 26 % (ref 15–46)
IRON SERPL-MCNC: 80 UG/DL (ref 37–145)
LDLC SERPL CALC-MCNC: 59 MG/DL
LYMPHOCYTES # BLD AUTO: 1.8 10E3/UL (ref 0.8–5.3)
LYMPHOCYTES NFR BLD AUTO: 31 %
MCH RBC QN AUTO: 31.7 PG (ref 26.5–33)
MCHC RBC AUTO-ENTMCNC: 34.2 G/DL (ref 31.5–36.5)
MCV RBC AUTO: 93 FL (ref 78–100)
MONOCYTES # BLD AUTO: 0.5 10E3/UL (ref 0–1.3)
MONOCYTES NFR BLD AUTO: 9 %
NEUTROPHILS # BLD AUTO: 3.4 10E3/UL (ref 1.6–8.3)
NEUTROPHILS NFR BLD AUTO: 57 %
NONHDLC SERPL-MCNC: 72 MG/DL
NRBC # BLD AUTO: 0 10E3/UL
NRBC BLD AUTO-RTO: 0 /100
PLATELET # BLD AUTO: 345 10E3/UL (ref 150–450)
POTASSIUM SERPL-SCNC: 4.4 MMOL/L (ref 3.4–5.3)
PROT SERPL-MCNC: 7.1 G/DL (ref 6.4–8.3)
RBC # BLD AUTO: 3.94 10E6/UL (ref 3.8–5.2)
SODIUM SERPL-SCNC: 135 MMOL/L (ref 136–145)
TRIGL SERPL-MCNC: 66 MG/DL
VIT B12 SERPL-MCNC: 289 PG/ML (ref 232–1245)
WBC # BLD AUTO: 6 10E3/UL (ref 4–11)

## 2023-03-02 PROCEDURE — 84630 ASSAY OF ZINC: CPT | Mod: 90 | Performed by: PATHOLOGY

## 2023-03-02 PROCEDURE — 84446 ASSAY OF VITAMIN E: CPT | Mod: 90 | Performed by: PATHOLOGY

## 2023-03-02 PROCEDURE — 82747 ASSAY OF FOLIC ACID RBC: CPT | Mod: 90 | Performed by: PATHOLOGY

## 2023-03-02 PROCEDURE — 82542 COL CHROMOTOGRAPHY QUAL/QUAN: CPT | Mod: 90 | Performed by: PATHOLOGY

## 2023-03-02 PROCEDURE — 82607 VITAMIN B-12: CPT | Performed by: PATHOLOGY

## 2023-03-02 PROCEDURE — 80061 LIPID PANEL: CPT | Performed by: PATHOLOGY

## 2023-03-02 PROCEDURE — 82306 VITAMIN D 25 HYDROXY: CPT | Performed by: PATHOLOGY

## 2023-03-02 PROCEDURE — 85025 COMPLETE CBC W/AUTO DIFF WBC: CPT | Performed by: PATHOLOGY

## 2023-03-02 PROCEDURE — 82728 ASSAY OF FERRITIN: CPT | Performed by: PATHOLOGY

## 2023-03-02 PROCEDURE — 84134 ASSAY OF PREALBUMIN: CPT | Performed by: PATHOLOGY

## 2023-03-02 PROCEDURE — 83036 HEMOGLOBIN GLYCOSYLATED A1C: CPT | Performed by: PATHOLOGY

## 2023-03-02 PROCEDURE — 80053 COMPREHEN METABOLIC PANEL: CPT | Performed by: PATHOLOGY

## 2023-03-02 PROCEDURE — 99000 SPECIMEN HANDLING OFFICE-LAB: CPT | Performed by: PATHOLOGY

## 2023-03-02 PROCEDURE — 84590 ASSAY OF VITAMIN A: CPT | Mod: 90 | Performed by: PATHOLOGY

## 2023-03-02 PROCEDURE — 83540 ASSAY OF IRON: CPT | Performed by: PATHOLOGY

## 2023-03-02 PROCEDURE — 99212 OFFICE O/P EST SF 10 MIN: CPT | Performed by: PEDIATRICS

## 2023-03-02 PROCEDURE — 36415 COLL VENOUS BLD VENIPUNCTURE: CPT | Performed by: PATHOLOGY

## 2023-03-02 PROCEDURE — 99215 OFFICE O/P EST HI 40 MIN: CPT | Performed by: PEDIATRICS

## 2023-03-02 PROCEDURE — 83550 IRON BINDING TEST: CPT | Performed by: PATHOLOGY

## 2023-03-02 RX ORDER — GLUCAGON INJECTION, SOLUTION 1 MG/.2ML
INJECTION, SOLUTION SUBCUTANEOUS
Qty: 0.4 ML | Refills: 0 | Status: SHIPPED | OUTPATIENT
Start: 2023-03-02

## 2023-03-02 RX ORDER — CHLORPHENIR/PHENYLEPH/ASPIRIN 2-7.8-325
1 TABLET, EFFERVESCENT ORAL PRN
Qty: 50 STRIP | Refills: 0 | Status: SHIPPED | OUTPATIENT
Start: 2023-03-02

## 2023-03-02 ASSESSMENT — PAIN SCALES - GENERAL: PAINLEVEL: SEVERE PAIN (7)

## 2023-03-02 NOTE — LETTER
3/2/2023         RE: Chantell Kidd  [address]  [facility] [id number]        Dear Colleague,    Thank you for referring your patient, Chantell Kidd, to the [facility]. Please see a copy of my visit note below.    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     Post-pancreatectomy diabetes (H)     Type 1 diabetes mellitus with hypoglycemia and without coma (H)     Migraine     Abdominal muscle strain     CIERRA (generalized anxiety disorder)     Major depressive disorder, recurrent episode, moderate (H)     CMC DJD(carpometacarpal degenerative joint disease), localized primary     Exocrine pancreatic insufficiency     Hypothyroidism     Hypoglycemia     Mood disorder due to a general medical condition     Decreased oral intake     Odynophagia     Iron deficiency     Anemia, iron deficiency     Adrenal insufficiency (H)     S/P hernia repair     Nausea     Chondromalacia of left patella     Gastrointestinal hemorrhage, unspecified gastrointestinal hemorrhage type     Age-related osteoporosis without current pathological fracture     Acute cystitis with hematuria     Gastroesophageal reflux disease without esophagitis     RUQ abdominal pain     Weight loss       HPI:  Chantell is a 55 year old female here for follow up of total pancreatectomy and islet autotransplant performed on January 6, 2012.  At the time of the procedure, the patient received 420,000 IEQ, or 5,438 IEQ/kg body weight.  She did not have diabetes before the procedure.  Early post-operative course was complicated by RLQ with appendicitis, eventually required appendectomy.    Despite the high  islet mass transplanted, Chantell's post-operative course was initially remarkable for high insulin needs.  She in fact does have islet function, as previously documented by mixed meal tests, but she was insulin resistant, requiring high doses of insulin when on MDI therapy.  She also had frequent day to day variability, and fluctuating patterns with illness and healthier times, as well as a complex regimen, all of which make her an excellent candidate for an insulin pump which she started in Feb 2014.  Extremely concerning was an episode of severe hypoglycemia in November 2013 at which time she was found unconscious.  Suspect at that time she was on too much basal insulin and because she was ill and not eating at the time, dropped far too low overnight.   In early 2014, she was started on an insulin pump with CGM.  Remarkably, her insulin needs have dropped dramatically on an insulin pump.  She was then relatively stable until 2016.  She was again admitted to the hospital on March 15 and March 29, 2016 for hypoglycemia, that appears to be secondary to both exogenous insulin and possible central adrenal insufficiency.   At that time she had the following lab findings:  On 3/29/16, elevated insulin with low C-peptide during BG 42 mg/dL around 5pm, and then again same pattern with BG 50s at 10pm.  Chantell is pretty clear that she did not take insuiln that day on 3/29/16. She also had three cortisol levels-- including one during hypoglycemia, one at around 4am (possibly also during hypoglycemia) and one 8am draw that were all low-- all 0.6- 1.6 range.    Of note, she did have a kenalog injection one week earlier on 3/24/16, just a few days prior to that draw and was also receiving narcotics in hospital (but not at home).  She has since had another severe hypogylcemic episode in Jan 2017 -- did not lose consciousness but was disoriented and unable to get help for herself at the store.  And again was admitted for severe  hypoglycemia at [facility] on 11/29/2017 (refer to 12/13/17 note) with labs consistent with exogenous insulin overdose although she denied taking any excess insulin (12/1 C-peptide <0.1 and insulin .64.7, 11/30 C-peptide 0.5 and insulin 91). Additional hypoglycemia admissions: 8/26/18, 9/3/18, with high insulin and low C-peptide c/w hypoglycemia secondary to exogenous insulin (9/3/18 at 6:41pm about 20 hours after last reported insulin dose; insulin 45.6 mU/L, C-peptide 0.2 ng/mL).    Picture is also complicated by non-diabetes history which includes the following :  - 7/6/2016 EGD identified diffuse candidiasis through entire esophagous.  Pt has also had recurrent oral thrush in 2016  - Recurrent chronic abdominal pain  - Recurrent vomiting of unclear etiology but G-T placed 10/2016 and converted to GJ 11/2016 with symptomatic improvement  Unclear if she has any gastroparesis.  Suboptimal study in March 2016 showed 100% retention at 1 hour and then rapid emptying.  Vomiting remains a concern  - Hernia repair performed by general surgery 9/15/16 (TPIAT team not involved).    - Chronic abdominal pain  - Weight loss in 2022, along with persistent vomiting.       New history today:  1)  Diabetes:  This is the first that I have seen Chantell in nearly 2 years, last seen 4/1/2021.    Since I last saw her she has changed over from 670G pump to an omnipod 5 and dexcom which I think is ultimately a better option for her.  Although she claims her pump and sensor are always communicating and that is not a problem, I see lots of times where her CGM is not showing up on her pump download, so I do think this is a problem.  She is also encountering automated insulin delivery alarms and getting kicked out of auto mode.  She is not bolusing at all which is a major contributor to poor control and max delivery alerts that she is seeing.  She did not even know how to bolus when I showed her on the pump, although I am sure that was  part of her training.  We went through this today together in the office.    Her A1c last month was up to 13.3%, and is now somewhat improved but still too high at 11%.    Needs new glucagon kit.          2)  Adrenal insufficiency:  Still unclear if this was transient or true central AI but we have been treating her regardless due to SHE history.  Had been maitained on cortef 20- 25 mg/day.  She is now up to 40 mg/day, which I think may be too generous a dose for long-term w/potential negative effects on diabetes and bone health, so we did decrease modestly again today.  Needs new solucortef emergency kit.    3)  Nutritional:  -Creon 12, takes 8 per meal, 5 w snacks- gets steatorrhea with less but seems to do OK at that dose.  - recurrent vomiting - 2 to 3 times per day.  - CT done in course of this that looks like it has a lot of stool, but did not seem to have any concerning findings for obstruction.   - she would like GJ tube because of difficulty eating and maintaining weight.  Is not currently seeing GI.           Review of systems:  A complete Review Of Systems is obtained and negative except as noted above.    Past Medical and Surgical History:  Past Medical History:   Diagnosis Date     Chronic abdominal pain      Chronic pancreatitis (H)     S/P pancreatectomy     Depression with anxiety      Gastro-oesophageal reflux disease      Hypothyroidism 4/23/2015     Kidney stones      Low serum cortisol level      Migraines      Other chronic pain     STOMACH     Other chronic pain     LUMBAR SPINE     Peripheral neuropathy      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    TPIAT     Spasm of sphincter of Oddi      Past Surgical History:   Procedure Laterality Date     ARTHROPLASTY CARPOMETACARPAL (THUMB JOINT)  5/2/2014    Procedure: ARTHROPLASTY CARPOMETACARPAL (THUMB JOINT);  Surgeon: Carina Panda MD;  Location: [facility]     CHOLECYSTECTOMY  2004     COLONOSCOPY  7/18/2014    Procedure: COLONOSCOPY;   Surgeon: Aurora Sahu MD;  [facility]: [facility]     COLONOSCOPY N/A 8/1/2017    Procedure: COLONOSCOPY;  Colonoscopy and upper endoscopy;  Surgeon: Deirdre Harris MD;  [facility]     ENDOSCOPIC RETROGRADE CHOLANGIOPANCREATOGRAM       ENDOSCOPIC RETROGRADE CHOLANGIOPANCREATOGRAM  4/19/2011    Procedure:ENDOSCOPIC RETROGRADE CHOLANGIOPANCREATOGRAM; Pancreatic Stent Placement       ENDOSCOPIC RETROGRADE CHOLANGIOPANCREATOGRAM  5/26/2011    Procedure:ENDOSCOPIC RETROGRADE CHOLANGIOPANCREATOGRAM; with Pancreatic Stent Removal; Surgeon:DALE MIMS; [facility]     ENDOSCOPY UPPER, COLONOSCOPY, COMBINED  4/25/2012    Procedure:COMBINED ENDOSCOPY UPPER, COLONOSCOPY; Enteroscopy with Bile Duct Stent Removal, Colonoscopy  *Latex Safe Room*; Surgeon:GRACY GODWINIQ; [facility]     ESOPHAGOSCOPY, GASTROSCOPY, DUODENOSCOPY (EGD), COMBINED  5/26/2011    Procedure:COMBINED ESOPHAGOSCOPY, GASTROSCOPY, DUODENOSCOPY (EGD); Surgeon:DALE MIMS; [facility]     ESOPHAGOSCOPY, GASTROSCOPY, DUODENOSCOPY (EGD), COMBINED N/A 10/30/2014    Procedure: COMBINED ESOPHAGOSCOPY, GASTROSCOPY, DUODENOSCOPY (EGD), BIOPSY SINGLE OR MULTIPLE;  Surgeon: Sarai Moon MD;  Location:  GI     ESOPHAGOSCOPY, GASTROSCOPY, DUODENOSCOPY (EGD), COMBINED Left 7/6/2015    Procedure: COMBINED ESOPHAGOSCOPY, GASTROSCOPY, DUODENOSCOPY (EGD), BIOPSY SINGLE OR MULTIPLE;  Surgeon: Thomas Estrada MD;  [facility]     ESOPHAGOSCOPY, GASTROSCOPY, DUODENOSCOPY (EGD), COMBINED N/A 7/8/2016    Procedure: COMBINED ESOPHAGOSCOPY, GASTROSCOPY, DUODENOSCOPY (EGD), BIOPSY SINGLE OR MULTIPLE;  Surgeon: Eloy Klein MD;  [facility]     ESOPHAGOSCOPY, GASTROSCOPY, DUODENOSCOPY (EGD), COMBINED N/A 8/4/2016    Procedure: COMBINED ESOPHAGOSCOPY, GASTROSCOPY, DUODENOSCOPY (EGD), BIOPSY SINGLE OR MULTIPLE;  Surgeon: Jason Brown MD;  [facility]      ESOPHAGOSCOPY, GASTROSCOPY, DUODENOSCOPY (EGD), COMBINED N/A 8/1/2017    Procedure: COMBINED ESOPHAGOSCOPY, GASTROSCOPY, DUODENOSCOPY (EGD);;  Surgeon: Deirdre Harris MD;  [facility]     ESOPHAGOSCOPY, GASTROSCOPY, DUODENOSCOPY (EGD), COMBINED N/A 6/12/2019    Procedure: ESOPHAGOGASTRODUODENOSCOPY (EGD);  Surgeon: Jose Francisco Perdomo MD;  [facility]     GYN SURGERY      Hysterectomy and USO     [facility] ENDOSCOPY W EUS  7/20/2011    Procedure:COMBINED ENDOSCOPIC ULTRASOUND, ESOPHAGOSCOPY, GASTROSCOPY, DUODENOSCOPY (EGD); Surgeon:DARVIN DONOHUE; [facility]     HERNIORRHAPHY VENTRAL N/A 9/15/2016    Procedure: HERNIORRHAPHY VENTRAL;  Surgeon: Juanita Bernabe MD;  [facility]     HYSTERECTOMY  1997 or 1998    USO     INCISION AND DRAINAGE ABDOMEN WASHOUT, COMBINED  8/16/2012    Procedure: COMBINED INCISION AND DRAINAGE ABDOMEN WASHOUT;  ,debridement and Drainage Post Appendectomy;  Surgeon: Ron Austin MD;  [facility]     INJECT TRANSVERSUS ABDOMINIS PLANE (TAP) BLOCK BILATERAL Bilateral 5/26/2016    Procedure: INJECT TRANSVERSUS ABDOMINIS PLANE (TAP) BLOCK BILATERAL;  Surgeon: Leonard Mccallum MD;  [facility]     LAPAROSCOPIC APPENDECTOMY  7/30/2012    Procedure: LAPAROSCOPIC APPENDECTOMY;  Open Appendectomy;  Surgeon: Ron Austin MD;  [facility]     PANCREATECTOMY, TRANSPLANT AUTO ISLET CELL, COMBINED  1/6/2012    Procedure:COMBINED PANCREATECTOMY, TRANSPLANT AUTO ISLET CELL; Total  Pancreatectomy, Auto Islet Transplant, splenectomy, 18fr. transgastric-jejunal feeding tube placement, liver biopsy; Surgeon:PALAK LEE; [facility]     REPLACE GASTROSTOMY TUBE, PERCUTANEOUS N/A 8/30/2017    Procedure: REPLACE GASTROSTOMY TUBE, PERCUTANEOUS;  GJ Tube Change;  Surgeon: Jose Nath PA-C;  [facility]     SPLENECTOMY         Family History:  New changes since last visit:  none  Family History   Problem Relation Age  "of Onset     Hypertension Mother      Diabetes Mother      Osteoporosis Mother      Cancer Father         pancreatic cancer     Diabetes Maternal Grandmother      Cardiovascular Maternal Grandmother      Cancer Maternal Grandfather         lung cancer     Cancer Sister         brain     Cancer Sister         liver cancer       Social History:  Social History     Social History Narrative     Not on file      Lives in [place] with her . Previously noted family stress (2018).      Physical Exam:  Vitals: /73   Pulse 94   Ht 1.575 m (5' 2\")   Wt 59.6 kg (131 lb 6.4 oz)   BMI 24.03 kg/m    BMI= Body mass index is 24.03 kg/m .   General:  Appearance is normal, no acute distress  HEENT:  NC/AT, sclera appear white  Neck:  No obvious thyromegaly  CV/Lungs:  Non distressed breathing  Skin:  No apparent rashes  Neuro:  Normal mental status  Psych:  Normal affect      Results:      Mixed Meal Test:  C Peptide   Date Value Ref Range Status   04/18/2019 1.8 0.9 - 6.9 ng/mL Final   09/07/2018 2.8 0.9 - 6.9 ng/mL Final   09/06/2018 1.8 0.9 - 6.9 ng/mL Final   09/03/2018 0.2 (L) 0.9 - 6.9 ng/mL Final   08/28/2018 0.3 (L) 0.9 - 6.9 ng/mL Final     Glucose   Date Value Ref Range Status   03/02/2023 387 (H) 70 - 99 mg/dL Final   09/26/2022 682 (HH) 70 - 99 mg/dL Final   11/12/2020 129 (H) 70 - 99 mg/dL Final   11/11/2020 99 70 - 99 mg/dL Final   11/10/2020 145 (H) 70 - 99 mg/dL Final   11/09/2020 100 (H) 70 - 99 mg/dL Final   11/09/2020 128 (H) 70 - 99 mg/dL Final       Hemoglobin A1c  Lab Results   Component Value Date    A1C 11.1 03/02/2023    A1C 13.3 09/26/2022    A1C 7.3 11/09/2020    A1C 6.7 11/21/2019    A1C 8.2 06/11/2019    A1C Canceled, Test credited 06/10/2019    A1C 9.6 04/18/2019       Liver enzymes  Lab Results   Component Value Date    AST 67 03/02/2023    AST 34 11/09/2020     Lab Results   Component Value Date    ALT 43 03/02/2023    ALT 50 11/09/2020     Lab Results   Component Value Date    " BILICONJ 0.0 05/20/2014      Lab Results   Component Value Date    BILITOTAL 0.3 03/02/2023    BILITOTAL 0.4 11/09/2020     Lab Results   Component Value Date    ALBUMIN 4.2 03/02/2023    ALBUMIN 3.2 11/09/2020     Lab Results   Component Value Date    PROTTOTAL 7.1 03/02/2023    PROTTOTAL 6.3 11/09/2020      Lab Results   Component Value Date    ALKPHOS 106 03/02/2023    ALKPHOS 88 11/09/2020       Creatinine:  Creatinine   Date Value Ref Range Status   03/02/2023 0.80 0.51 - 0.95 mg/dL Final   11/12/2020 0.83 0.52 - 1.04 mg/dL Final   ]    Complete Blood Count:  CBC RESULTS:   Recent Labs   Lab Test 03/02/23  1514   WBC 6.0   RBC 3.94   HGB 12.5   HCT 36.6   MCV 93   MCH 31.7   MCHC 34.2   RDW 12.0          Cholesterol  Lab Results   Component Value Date    CHOL 178 03/02/2023    CHOL 168 07/12/2019     Lab Results   Component Value Date     03/02/2023    HDL 83 07/12/2019     Lab Results   Component Value Date    LDL 59 03/02/2023    LDL 71 07/12/2019     Lab Results   Component Value Date    TRIG 66 03/02/2023    TRIG 73 07/12/2019     Lab Results   Component Value Date    CHOLHDLRATIO 3.2 01/08/2015         Assessment:  1. 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  Treated for central adrenal insufficiency.        Chantell is a 57 year old  with history of chronic pancreatitis who is s/p total pancreatectomy and islet autotransplant, with insulin dependence (pump therapy) and partial islet graft function, complicated by insulin resistance, and several episodes of severe hypoglycemia and seizures (E10.641).  She is treated with a continuous subcutaneous insulin infusion pump.     Chantell has post-pancreatectomy diabetes-- essentially a surgical form of type 1 diabetes (E10.641).  She requires an insulin pump for management due to her multiple episodes of hypoglycemia and hypoglycemia unawareness, including episodes of seizures that required suspension of  insulin pump and hospital admits. She also should have a continuous glucose monitor for frequent monitoring because of her severe hypoglycemia history.  Chantell continues to wear a CGM, and needs to be on CGM or test 4 times per day, and we documented during the clinic encounter today use of CGM from review of her logs.  Chantell requires frequent insulin adjustments to her insulin regimen based on her blood glucoses to control hyperglycemia and hypoglycemia.       Chantell has recently upgraded to Omnipod 5.  I think this will be a good regimen for her but we identified some challenges that are preventing ideal control.  For reasons not clear to me, there are many gaps in CGM data in her pump.  She is wearing pump and sensor in close proximity currently.  An even bigger contributor is that she is not bolusing- at all- and she needs to be.  We went through how to do this twice in clinic on her pump.  She is to put in all her carbs before eating (even despite the vomiting- as highs and not lows are the issue right now) and she should hit 'use CGM' for correction dosing when she is running high.    Changes made to cortef dosing, and new emergency kits filled and discussed.      Plan:  1.  Changes to current diabetes regimen:  Patient Instructions   1) right now you are only getting basal on the omnipod and I am pretty sure that is part of the issue that is kicking you out into manual mode.  So let's start putting your carbs in before eating.  You should go ahead and put all of them in, but let me know if you see lows doing that.     2)  reduce hydrocortisone 10 mg (1 pill) three times per day because I think that it may be too high now and working against you.  If you have fever 101 or higher, go to 20 mg three times per day until 24 hours after illness is improving.    3)  New emergency kits sent:  Gvoke (glucagon)  Solucortef (hydrocortisone).    4)  I will talk to team regarding GI /surgical concerns (vomiting) -- suspect is  that your bowels are not moving forward and stomach is not emptying well, and getting blood sugars under control will help with this to some extent.     Follow up June 8 at 3pm        2.  Frequency of blood sugar checks:  Premeal, bedtime, and additional measurements PRN-- Should have strips sufficient to test 8 times per day given her labiltiy history.    3.  Continue routine follow up for autoislet transplant patients:  Mixed meal test (6 mL/kg BoostHP to max of 360 mL) at 3 months, 6 months, and once a year post transplant.  Hemoglobin A1c levels at these time points and quarterly.    4.  Other issues addressed today:  As above        Follow up: as above    Contact me for questions at [phone] or [phone].  Emergency number to reach pediatric endocrinology after hours is [phone].      Dr. Amena Maher MD  [facility]  Director of Research, Islet Auto-transplant Program  Phone:  [phone]  Electronically signed: March 3, 2023 at 8:34 AM      Review of the result(s) of each unique test - Hba1c and pump data.  40 minutes spent on the date of the encounter doing chart review, history and exam, documentation and further activities per the note          Again, thank you for allowing me to participate in the care of your patient.      Sincerely,    Amena Maher MD

## 2023-03-02 NOTE — PATIENT INSTRUCTIONS
1) right now you are only getting basal on the omnipod and I am pretty sure that is part of the issue that is kicking you out into manual mode.  So let's start putting your carbs in before eating.  You should go ahead and put all of them in, but let me know if you see lows doing that.     2)  reduce hydrocortisone 10 mg (1 pill) three times per day because I think that it may be too high now and working against you.  If you have fever 101 or higher, go to 20 mg three times per day until 24 hours after illness is improving.    3)  New emergency kits sent:  Gvoke (glucagon)  Solucortef (hydrocortisone).    4)  I will talk to team regarding GI /surgical concerns (vomiting) -- suspect is that your bowels are not moving forward and stomach is not emptying well, and getting blood sugars under control will help with this to some extent.     Follow up June 8 at 3pm

## 2023-03-02 NOTE — NURSING NOTE
"Chief Complaint   Patient presents with     RECHECK     Follow up     /73   Pulse 94   Ht 1.575 m (5' 2\")   Wt 59.6 kg (131 lb 6.4 oz)   BMI 24.03 kg/m    Mildred Viera CMA on 3/2/2023 at 3:25 PM    "

## 2023-03-03 ENCOUNTER — TELEPHONE (OUTPATIENT)
Dept: ORTHOPEDICS | Facility: CLINIC | Age: 58
End: 2023-03-03

## 2023-03-03 DIAGNOSIS — E10.649 TYPE 1 DIABETES MELLITUS WITH HYPOGLYCEMIA AND WITHOUT COMA (H): ICD-10-CM

## 2023-03-03 DIAGNOSIS — R10.9 ABDOMINAL PAIN: ICD-10-CM

## 2023-03-03 DIAGNOSIS — E13.9 POST-PANCREATECTOMY DIABETES (H): Primary | ICD-10-CM

## 2023-03-03 DIAGNOSIS — E89.1 POST-PANCREATECTOMY DIABETES (H): Primary | ICD-10-CM

## 2023-03-03 DIAGNOSIS — Z90.410 POST-PANCREATECTOMY DIABETES (H): Primary | ICD-10-CM

## 2023-03-03 LAB — PREALB SERPL IA-MCNC: 19 MG/DL (ref 15–45)

## 2023-03-03 NOTE — TELEPHONE ENCOUNTER
Good morning,    This patient's insurance does not require prior authorization for the Synvisc One injection requested but the patient needs to meet the medical criteria below before submitting the request to avoid denial.       Intra-articular hyaluronan injections (Synvisc , Synvisc-One , and Euflexxa ) may be considered MEDICALLY NECESSARY AND APPROPRIATE for the treatment of painful osteoarthritis of the knee in patients who meet ALL of the following criteria:    There is documentation of a diagnosis of osteoarthritis of the knee supported by radiologic evidence including one or more of the following:  Joint space narrowing,  Subchondral sclerosis,  Osteophytes and sub-chondral cysts; AND  There is documentation that pain due to osteoarthritis of the knee interferes with functional activities (e.g., walking, prolonged standing); AND  Pain has persisted despite use of BOTH of the following within the previous 6 months:  Medical management with acetaminophen, nonsteroidal anti-inflammatory agents (NSAIDS) or other analgesic medications for a minimum of 3 months unless there is a contraindication to use; AND  Physical therapy: 4 week course; AND  There is no evidence of other joint disease (e.g., rheumatoid or psoriatic arthritis); AND  For commercial health plan members only, step therapy supplement criteria may apply for select conditions (see policy II-242: Step Therapy Supplement).      I do not see notes regarding conservative treatment for at least 3 months within the last 6 months. Please advise.

## 2023-03-03 NOTE — PROGRESS NOTES
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     Post-pancreatectomy diabetes (H)     Type 1 diabetes mellitus with hypoglycemia and without coma (H)     Migraine     Abdominal muscle strain     CIERRA (generalized anxiety disorder)     Major depressive disorder, recurrent episode, moderate (H)     CMC DJD(carpometacarpal degenerative joint disease), localized primary     Exocrine pancreatic insufficiency     Hypothyroidism     Hypoglycemia     Mood disorder due to a general medical condition     Decreased oral intake     Odynophagia     Iron deficiency     Anemia, iron deficiency     Adrenal insufficiency (H)     S/P hernia repair     Nausea     Chondromalacia of left patella     Gastrointestinal hemorrhage, unspecified gastrointestinal hemorrhage type     Age-related osteoporosis without current pathological fracture     Acute cystitis with hematuria     Gastroesophageal reflux disease without esophagitis     RUQ abdominal pain     Weight loss       HPI:  Chantell is a 55 year old female here for follow up of total pancreatectomy and islet autotransplant performed on January 6, 2012.  At the time of the procedure, the patient received 420,000 IEQ, or 5,438 IEQ/kg body weight.  She did not have diabetes before the procedure.  Early post-operative course was complicated by RLQ with appendicitis, eventually required appendectomy.    Despite the high islet mass transplanted, Chantell's post-operative course was initially remarkable for high insulin needs.  She in fact does have islet function, as previously documented by mixed meal tests, but she was insulin resistant, requiring high doses of insulin  when on MDI therapy.  She also had frequent day to day variability, and fluctuating patterns with illness and healthier times, as well as a complex regimen, all of which make her an excellent candidate for an insulin pump which she started in Feb 2014.  Extremely concerning was an episode of severe hypoglycemia in November 2013 at which time she was found unconscious.  Suspect at that time she was on too much basal insulin and because she was ill and not eating at the time, dropped far too low overnight.   In early 2014, she was started on an insulin pump with CGM.  Remarkably, her insulin needs have dropped dramatically on an insulin pump.  She was then relatively stable until 2016.  She was again admitted to the hospital on March 15 and March 29, 2016 for hypoglycemia, that appears to be secondary to both exogenous insulin and possible central adrenal insufficiency.   At that time she had the following lab findings:  On 3/29/16, elevated insulin with low C-peptide during BG 42 mg/dL around 5pm, and then again same pattern with BG 50s at 10pm.  Chantell is pretty clear that she did not take insuiln that day on 3/29/16. She also had three cortisol levels-- including one during hypoglycemia, one at around 4am (possibly also during hypoglycemia) and one 8am draw that were all low-- all 0.6- 1.6 range.    Of note, she did have a kenalog injection one week earlier on 3/24/16, just a few days prior to that draw and was also receiving narcotics in hospital (but not at home).  She has since had another severe hypogylcemic episode in Jan 2017 -- did not lose consciousness but was disoriented and unable to get help for herself at the store.  And again was admitted for severe hypoglycemia at [facility] on 11/29/2017 (refer to 12/13/17 note) with labs consistent with exogenous insulin overdose although she denied taking any excess insulin (12/1 C-peptide <0.1 and insulin .64.7, 11/30 C-peptide 0.5 and insulin 91). Additional  hypoglycemia admissions: 8/26/18, 9/3/18, with high insulin and low C-peptide c/w hypoglycemia secondary to exogenous insulin (9/3/18 at 6:41pm about 20 hours after last reported insulin dose; insulin 45.6 mU/L, C-peptide 0.2 ng/mL).    Picture is also complicated by non-diabetes history which includes the following :  - 7/6/2016 EGD identified diffuse candidiasis through entire esophagous.  Pt has also had recurrent oral thrush in 2016  - Recurrent chronic abdominal pain  - Recurrent vomiting of unclear etiology but G-T placed 10/2016 and converted to GJ 11/2016 with symptomatic improvement  Unclear if she has any gastroparesis.  Suboptimal study in March 2016 showed 100% retention at 1 hour and then rapid emptying.  Vomiting remains a concern  - Hernia repair performed by general surgery 9/15/16 (TPIAT team not involved).    - Chronic abdominal pain  - Weight loss in 2022, along with persistent vomiting.       New history today:  1)  Diabetes:  This is the first that I have seen Chantell in nearly 2 years, last seen 4/1/2021.    Since I last saw her she has changed over from 670G pump to an omnipod 5 and dexcom which I think is ultimately a better option for her.  Although she claims her pump and sensor are always communicating and that is not a problem, I see lots of times where her CGM is not showing up on her pump download, so I do think this is a problem.  She is also encountering automated insulin delivery alarms and getting kicked out of auto mode.  She is not bolusing at all which is a major contributor to poor control and max delivery alerts that she is seeing.  She did not even know how to bolus when I showed her on the pump, although I am sure that was part of her training.  We went through this today together in the office.    Her A1c last month was up to 13.3%, and is now somewhat improved but still too high at 11%.    Needs new glucagon kit.          2)  Adrenal insufficiency:  Still unclear if this was  transient or true central AI but we have been treating her regardless due to SHE history.  Had been maitained on cortef 20- 25 mg/day.  She is now up to 40 mg/day, which I think may be too generous a dose for long-term w/potential negative effects on diabetes and bone health, so we did decrease modestly again today.  Needs new solucort emergency kit.    3)  Nutritional:  -Creon 12, takes 8 per meal, 5 w snacks- gets steatorrhea with less but seems to do OK at that dose.  - recurrent vomiting - 2 to 3 times per day.  - CT done in course of this that looks like it has a lot of stool, but did not seem to have any concerning findings for obstruction.   - she would like GJ tube because of difficulty eating and maintaining weight.  Is not currently seeing GI.           Review of systems:  A complete Review Of Systems is obtained and negative except as noted above.    Past Medical and Surgical History:  Past Medical History:   Diagnosis Date     Chronic abdominal pain      Chronic pancreatitis (H)     S/P pancreatectomy     Depression with anxiety      Gastro-oesophageal reflux disease      Hypothyroidism 4/23/2015     Kidney stones      Low serum cortisol level      Migraines      Other chronic pain     STOMACH     Other chronic pain     LUMBAR SPINE     Peripheral neuropathy      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    TPIAT     Spasm of sphincter of Oddi      Past Surgical History:   Procedure Laterality Date     ARTHROPLASTY CARPOMETACARPAL (THUMB JOINT)  5/2/2014    Procedure: ARTHROPLASTY CARPOMETACARPAL (THUMB JOINT);  Surgeon: Carina Panda MD;  Location: [facility] OR     CHOLECYSTECTOMY  2004     COLONOSCOPY  7/18/2014    Procedure: COLONOSCOPY;  Surgeon: Aurora Sahu MD;  Location:  GI     COLONOSCOPY N/A 8/1/2017    Procedure: COLONOSCOPY;  Colonoscopy and upper endoscopy;  Surgeon: Deirdre Harris MD;  Location:  GI     ENDOSCOPIC RETROGRADE CHOLANGIOPANCREATOGRAM        ENDOSCOPIC RETROGRADE CHOLANGIOPANCREATOGRAM  4/19/2011    Procedure:ENDOSCOPIC RETROGRADE CHOLANGIOPANCREATOGRAM; Pancreatic Stent Placement       ENDOSCOPIC RETROGRADE CHOLANGIOPANCREATOGRAM  5/26/2011    Procedure:ENDOSCOPIC RETROGRADE CHOLANGIOPANCREATOGRAM; with Pancreatic Stent Removal; Surgeon:DALE MIMS; Location:[facility]     ENDOSCOPY UPPER, COLONOSCOPY, COMBINED  4/25/2012    Procedure:COMBINED ENDOSCOPY UPPER, COLONOSCOPY; Enteroscopy with Bile Duct Stent Removal, Colonoscopy  *Latex Safe Room*; Surgeon:GRACY GODWINIQ; Location:[facility]     ESOPHAGOSCOPY, GASTROSCOPY, DUODENOSCOPY (EGD), COMBINED  5/26/2011    Procedure:COMBINED ESOPHAGOSCOPY, GASTROSCOPY, DUODENOSCOPY (EGD); Surgeon:DALE MIMS; [facility]     ESOPHAGOSCOPY, GASTROSCOPY, DUODENOSCOPY (EGD), COMBINED N/A 10/30/2014    Procedure: COMBINED ESOPHAGOSCOPY, GASTROSCOPY, DUODENOSCOPY (EGD), BIOPSY SINGLE OR MULTIPLE;  Surgeon: Sarai Moon MD;  Location: [facility]     ESOPHAGOSCOPY, GASTROSCOPY, DUODENOSCOPY (EGD), COMBINED Left 7/6/2015    Procedure: COMBINED ESOPHAGOSCOPY, GASTROSCOPY, DUODENOSCOPY (EGD), BIOPSY SINGLE OR MULTIPLE;  Surgeon: Thomas Estrada MD;  [facility]: [facility]     ESOPHAGOSCOPY, GASTROSCOPY, DUODENOSCOPY (EGD), COMBINED N/A 7/8/2016    Procedure: COMBINED ESOPHAGOSCOPY, GASTROSCOPY, DUODENOSCOPY (EGD), BIOPSY SINGLE OR MULTIPLE;  Surgeon: Eloy Klein MD;  [facility]: [facility]     ESOPHAGOSCOPY, GASTROSCOPY, DUODENOSCOPY (EGD), COMBINED N/A 8/4/2016    Procedure: COMBINED ESOPHAGOSCOPY, GASTROSCOPY, DUODENOSCOPY (EGD), BIOPSY SINGLE OR MULTIPLE;  Surgeon: Jason Brown MD;  [facility]: [facility]     ESOPHAGOSCOPY, GASTROSCOPY, DUODENOSCOPY (EGD), COMBINED N/A 8/1/2017    Procedure: COMBINED ESOPHAGOSCOPY, GASTROSCOPY, DUODENOSCOPY (EGD);;  Surgeon: Deirdre Harris MD;  [facility]: [facility]     ESOPHAGOSCOPY, GASTROSCOPY, DUODENOSCOPY (EGD), COMBINED N/A 6/12/2019     Procedure: ESOPHAGOGASTRODUODENOSCOPY (EGD);  Surgeon: Jose Francisco Perdomo MD;  Location: [facility]     GYN SURGERY      Hysterectomy and USO     [facility] ENDOSCOPY W EUS  7/20/2011    Procedure:COMBINED ENDOSCOPIC ULTRASOUND, ESOPHAGOSCOPY, GASTROSCOPY, DUODENOSCOPY (EGD); Surgeon:DARVIN DONOHUE; [facility]     HERNIORRHAPHY VENTRAL N/A 9/15/2016    Procedure: HERNIORRHAPHY VENTRAL;  Surgeon: Juanita Bernabe MD;  [facility]     HYSTERECTOMY  1997 or 1998    USO     INCISION AND DRAINAGE ABDOMEN WASHOUT, COMBINED  8/16/2012    Procedure: COMBINED INCISION AND DRAINAGE ABDOMEN WASHOUT;  ,debridement and Drainage Post Appendectomy;  Surgeon: Ron Austin MD;  [facility]     INJECT TRANSVERSUS ABDOMINIS PLANE (TAP) BLOCK BILATERAL Bilateral 5/26/2016    Procedure: INJECT TRANSVERSUS ABDOMINIS PLANE (TAP) BLOCK BILATERAL;  Surgeon: Leonard Mccallum MD;  [facility]     LAPAROSCOPIC APPENDECTOMY  7/30/2012    Procedure: LAPAROSCOPIC APPENDECTOMY;  Open Appendectomy;  Surgeon: Ron Austin MD;  [facility]     PANCREATECTOMY, TRANSPLANT AUTO ISLET CELL, COMBINED  1/6/2012    Procedure:COMBINED PANCREATECTOMY, TRANSPLANT AUTO ISLET CELL; Total  Pancreatectomy, Auto Islet Transplant, splenectomy, 18fr. transgastric-jejunal feeding tube placement, liver biopsy; Surgeon:PALAK LEE; [facility]     REPLACE GASTROSTOMY TUBE, PERCUTANEOUS N/A 8/30/2017    Procedure: REPLACE GASTROSTOMY TUBE, PERCUTANEOUS;  GJ Tube Change;  Surgeon: Jose Nath PA-C;  [facility]     SPLENECTOMY         Family History:  New changes since last visit:  none  Family History   Problem Relation Age of Onset     Hypertension Mother      Diabetes Mother      Osteoporosis Mother      Cancer Father         pancreatic cancer     Diabetes Maternal Grandmother      Cardiovascular Maternal Grandmother      Cancer Maternal Grandfather         lung cancer     Cancer Sister          "brain     Cancer Sister         liver cancer       Social History:  Social History     Social History Narrative     Not on file      Lives in [place] with her . Previously noted family stress (2018).      Physical Exam:  Vitals: /73   Pulse 94   Ht 1.575 m (5' 2\")   Wt 59.6 kg (131 lb 6.4 oz)   BMI 24.03 kg/m    BMI= Body mass index is 24.03 kg/m .   General:  Appearance is normal, no acute distress  HEENT:  NC/AT, sclera appear white  Neck:  No obvious thyromegaly  CV/Lungs:  Non distressed breathing  Skin:  No apparent rashes  Neuro:  Normal mental status  Psych:  Normal affect      Results:      Mixed Meal Test:  C Peptide   Date Value Ref Range Status   04/18/2019 1.8 0.9 - 6.9 ng/mL Final   09/07/2018 2.8 0.9 - 6.9 ng/mL Final   09/06/2018 1.8 0.9 - 6.9 ng/mL Final   09/03/2018 0.2 (L) 0.9 - 6.9 ng/mL Final   08/28/2018 0.3 (L) 0.9 - 6.9 ng/mL Final     Glucose   Date Value Ref Range Status   03/02/2023 387 (H) 70 - 99 mg/dL Final   09/26/2022 682 (HH) 70 - 99 mg/dL Final   11/12/2020 129 (H) 70 - 99 mg/dL Final   11/11/2020 99 70 - 99 mg/dL Final   11/10/2020 145 (H) 70 - 99 mg/dL Final   11/09/2020 100 (H) 70 - 99 mg/dL Final   11/09/2020 128 (H) 70 - 99 mg/dL Final       Hemoglobin A1c  Lab Results   Component Value Date    A1C 11.1 03/02/2023    A1C 13.3 09/26/2022    A1C 7.3 11/09/2020    A1C 6.7 11/21/2019    A1C 8.2 06/11/2019    A1C Canceled, Test credited 06/10/2019    A1C 9.6 04/18/2019       Liver enzymes  Lab Results   Component Value Date    AST 67 03/02/2023    AST 34 11/09/2020     Lab Results   Component Value Date    ALT 43 03/02/2023    ALT 50 11/09/2020     Lab Results   Component Value Date    BILICONJ 0.0 05/20/2014      Lab Results   Component Value Date    BILITOTAL 0.3 03/02/2023    BILITOTAL 0.4 11/09/2020     Lab Results   Component Value Date    ALBUMIN 4.2 03/02/2023    ALBUMIN 3.2 11/09/2020     Lab Results   Component Value Date    PROTTOTAL 7.1 03/02/2023    " PROTTOTAL 6.3 11/09/2020      Lab Results   Component Value Date    ALKPHOS 106 03/02/2023    ALKPHOS 88 11/09/2020       Creatinine:  Creatinine   Date Value Ref Range Status   03/02/2023 0.80 0.51 - 0.95 mg/dL Final   11/12/2020 0.83 0.52 - 1.04 mg/dL Final   ]    Complete Blood Count:  CBC RESULTS:   Recent Labs   Lab Test 03/02/23  1514   WBC 6.0   RBC 3.94   HGB 12.5   HCT 36.6   MCV 93   MCH 31.7   MCHC 34.2   RDW 12.0          Cholesterol  Lab Results   Component Value Date    CHOL 178 03/02/2023    CHOL 168 07/12/2019     Lab Results   Component Value Date     03/02/2023    HDL 83 07/12/2019     Lab Results   Component Value Date    LDL 59 03/02/2023    LDL 71 07/12/2019     Lab Results   Component Value Date    TRIG 66 03/02/2023    TRIG 73 07/12/2019     Lab Results   Component Value Date    CHOLHDLRATIO 3.2 01/08/2015         Assessment:  1. 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  Treated for central adrenal insufficiency.        Chantell is a 57 year old  with history of chronic pancreatitis who is s/p total pancreatectomy and islet autotransplant, with insulin dependence (pump therapy) and partial islet graft function, complicated by insulin resistance, and several episodes of severe hypoglycemia and seizures (E10.641).  She is treated with a continuous subcutaneous insulin infusion pump.     Chantell has post-pancreatectomy diabetes-- essentially a surgical form of type 1 diabetes (E10.641).  She requires an insulin pump for management due to her multiple episodes of hypoglycemia and hypoglycemia unawareness, including episodes of seizures that required suspension of insulin pump and hospital admits. She also should have a continuous glucose monitor for frequent monitoring because of her severe hypoglycemia history.  Chantell continues to wear a CGM, and needs to be on CGM or test 4 times per day, and we documented during the clinic encounter  today use of CGM from review of her logs.  Chantell requires frequent insulin adjustments to her insulin regimen based on her blood glucoses to control hyperglycemia and hypoglycemia.       Chantell has recently upgraded to Omnipod 5.  I think this will be a good regimen for her but we identified some challenges that are preventing ideal control.  For reasons not clear to me, there are many gaps in CGM data in her pump.  She is wearing pump and sensor in close proximity currently.  An even bigger contributor is that she is not bolusing- at all- and she needs to be.  We went through how to do this twice in clinic on her pump.  She is to put in all her carbs before eating (even despite the vomiting- as highs and not lows are the issue right now) and she should hit 'use CGM' for correction dosing when she is running high.    Changes made to cortef dosing, and new emergency kits filled and discussed.      Plan:  1.  Changes to current diabetes regimen:  Patient Instructions   1) right now you are only getting basal on the omnipod and I am pretty sure that is part of the issue that is kicking you out into manual mode.  So let's start putting your carbs in before eating.  You should go ahead and put all of them in, but let me know if you see lows doing that.     2)  reduce hydrocortisone 10 mg (1 pill) three times per day because I think that it may be too high now and working against you.  If you have fever 101 or higher, go to 20 mg three times per day until 24 hours after illness is improving.    3)  New emergency kits sent:  Gvoke (glucagon)  Solucortef (hydrocortisone).    4)  I will talk to team regarding GI /surgical concerns (vomiting) -- suspect is that your bowels are not moving forward and stomach is not emptying well, and getting blood sugars under control will help with this to some extent.     Follow up June 8 at 3pm        2.  Frequency of blood sugar checks:  Premeal, bedtime, and additional measurements PRN--  Should have strips sufficient to test 8 times per day given her labiltiy history.    3.  Continue routine follow up for autoislet transplant patients:  Mixed meal test (6 mL/kg BoostHP to max of 360 mL) at 3 months, 6 months, and once a year post transplant.  Hemoglobin A1c levels at these time points and quarterly.    4.  Other issues addressed today:  As above        Follow up: as above    Contact me for questions at [phone] or [phone].  Emergency number to reach pediatric endocrinology after hours is [phone].      Dr. Amena Maher MD  [facility] [place]  Director of Research, Islet Auto-transplant Program  Phone:  [phone]  Electronically signed: March 3, 2023 at 8:34 AM            Review of the result(s) of each unique test - Hba1c and pump data.  40 minutes spent on the date of the encounter doing chart review, history and exam, documentation and further activities per the note

## 2023-03-04 LAB
FOLATE RBC-MCNC: 339 NG/ML
ZINC SERPL-MCNC: 60.2 UG/DL

## 2023-03-05 LAB
A-TOCOPHEROL VIT E SERPL-MCNC: 4.9 MG/L
ANNOTATION COMMENT IMP: ABNORMAL
BETA+GAMMA TOCOPHEROL SERPL-MCNC: 1.2 MG/L
RETINYL PALMITATE SERPL-MCNC: <0.02 MG/L
VIT A SERPL-MCNC: 0.17 MG/L

## 2023-03-06 ENCOUNTER — TELEPHONE (OUTPATIENT)
Dept: ENDOCRINOLOGY | Facility: CLINIC | Age: 58
End: 2023-03-06
Payer: COMMERCIAL

## 2023-03-06 LAB
DEPRECATED CALCIDIOL+CALCIFEROL SERPL-MC: <14 UG/L (ref 20–75)
VITAMIN D2 SERPL-MCNC: <5 UG/L
VITAMIN D3 SERPL-MCNC: 9 UG/L

## 2023-03-07 LAB
A-LINOLENATE SERPL-SCNC: 39 NMOL/ML (ref 50–130)
AA SERPL-SCNC: 1040 NMOL/ML (ref 520–1490)
ARACHIDATE SERPL-SCNC: 29 NMOL/ML (ref 50–90)
CLINICAL BIOCHEMIST REVIEW: ABNORMAL
DHA SERPL-SCNC: 136 NMOL/ML (ref 30–250)
DOCOSAPENTAENATE W6 SERPL-SCNC: 67 NMOL/ML (ref 10–70)
DOCOSATETRAENOATE SERPL-SCNC: 63 NMOL/ML (ref 10–80)
DOCOSENOATE SERPL-SCNC: 11 NMOL/ML (ref 4–13)
DPA SERPL-SCNC: 79 NMOL/ML (ref 20–210)
EPA SERPL-SCNC: 98 NMOL/ML (ref 14–100)
FA SERPL-SCNC: 12.3 MMOL/L (ref 7.3–16.8)
G-LINOLENATE SERPL-SCNC: 58 NMOL/ML (ref 16–150)
HEXADECENOATE SERPL-SCNC: 103 NMOL/ML (ref 25–105)
HOMO-G LINOLENATE SERPL-SCNC: 183 NMOL/ML (ref 50–250)
LAURATE SERPL-SCNC: 38 NMOL/ML (ref 6–90)
LINOLEATE SERPL-SCNC: 2380 NMOL/ML (ref 2270–3850)
MEAD ACID SERPL-SCNC: 56 NMOL/ML (ref 7–30)
MONOUNSAT FA SERPL-SCNC: 4.1 MMOL/L (ref 1.3–5.8)
MYRISTATE SERPL-SCNC: 206 NMOL/ML (ref 30–450)
NERVONATE SERPL-SCNC: 114 NMOL/ML (ref 60–100)
OCTADECANOATE SERPL-SCNC: 850 NMOL/ML (ref 590–1170)
OLEATE SERPL-SCNC: 2305 NMOL/ML (ref 650–3500)
PALMITATE SERPL-SCNC: 2704 NMOL/ML (ref 1480–3730)
PALMITOLEATE SERPL-SCNC: 1161 NMOL/ML (ref 110–1130)
POLYUNSAT FA SERPL-SCNC: 4.2 MMOL/L (ref 3.2–5.8)
SAT FA SERPL-SCNC: 3.9 MMOL/L (ref 2.5–5.5)
TRIENOATE/AA SERPL-SRTO: 0.05 {RATIO} (ref 0.01–0.04)
VACCENATE SERPL-SCNC: 394 NMOL/ML (ref 280–740)
W3 FA SERPL-SCNC: 0.4 MMOL/L (ref 0.2–0.5)
W6 FA SERPL-SCNC: 3.8 MMOL/L (ref 3–5.4)

## 2023-03-08 ENCOUNTER — OFFICE VISIT (OUTPATIENT)
Dept: OBGYN | Facility: CLINIC | Age: 58
End: 2023-03-08
Payer: COMMERCIAL

## 2023-03-08 VITALS
HEIGHT: 62 IN | SYSTOLIC BLOOD PRESSURE: 130 MMHG | BODY MASS INDEX: 24.48 KG/M2 | WEIGHT: 133 LBS | DIASTOLIC BLOOD PRESSURE: 78 MMHG

## 2023-03-08 DIAGNOSIS — N95.0 POSTMENOPAUSAL BLEEDING: Primary | ICD-10-CM

## 2023-03-08 DIAGNOSIS — Z12.4 CERVICAL CANCER SCREENING: ICD-10-CM

## 2023-03-08 DIAGNOSIS — Z12.31 VISIT FOR SCREENING MAMMOGRAM: ICD-10-CM

## 2023-03-08 PROCEDURE — 87624 HPV HI-RISK TYP POOLED RSLT: CPT | Performed by: OBSTETRICS & GYNECOLOGY

## 2023-03-08 PROCEDURE — G0145 SCR C/V CYTO,THINLAYER,RESCR: HCPCS | Performed by: OBSTETRICS & GYNECOLOGY

## 2023-03-08 PROCEDURE — 99203 OFFICE O/P NEW LOW 30 MIN: CPT | Performed by: OBSTETRICS & GYNECOLOGY

## 2023-03-08 NOTE — PROGRESS NOTES
Chantell is a 57 year old   is here today complaining of bleeding.  This has been a problem for several months. She denies any cramping.      ROS: Pertinent ROS as above.    Gyn Hx:      Past Medical History:   Diagnosis Date     Chronic abdominal pain      Chronic pancreatitis (H)     S/P pancreatectomy     Depression with anxiety      Gastro-oesophageal reflux disease      Hypothyroidism 2015     Kidney stones      Low serum cortisol level      Migraines      Other chronic pain     STOMACH     Other chronic pain     LUMBAR SPINE     Peripheral neuropathy      Post-pancreatectomy diabetes (H) 2012    TPIAT     Spasm of sphincter of Oddi      Past Surgical History:   Procedure Laterality Date     ARTHROPLASTY CARPOMETACARPAL (THUMB JOINT)  2014    Procedure: ARTHROPLASTY CARPOMETACARPAL (THUMB JOINT);  Surgeon: Carina Panda MD;  Location: [facility]     CHOLECYSTECTOMY  2004     COLONOSCOPY  2014    Procedure: COLONOSCOPY;  Surgeon: Aurora Sahu MD;  Location: [facility]     COLONOSCOPY N/A 2017    Procedure: COLONOSCOPY;  Colonoscopy and upper endoscopy;  Surgeon: Deirdre Harris MD;  [facility]: [facility]     ENDOSCOPIC RETROGRADE CHOLANGIOPANCREATOGRAM       ENDOSCOPIC RETROGRADE CHOLANGIOPANCREATOGRAM  2011    Procedure:ENDOSCOPIC RETROGRADE CHOLANGIOPANCREATOGRAM; Pancreatic Stent Placement       ENDOSCOPIC RETROGRADE CHOLANGIOPANCREATOGRAM  2011    Procedure:ENDOSCOPIC RETROGRADE CHOLANGIOPANCREATOGRAM; with Pancreatic Stent Removal; Surgeon:DALE MIMS; Location[facility]     ENDOSCOPY UPPER, COLONOSCOPY, COMBINED  2012    Procedure:COMBINED ENDOSCOPY UPPER, COLONOSCOPY; Enteroscopy with Bile Duct Stent Removal, Colonoscopy  *[facility] Room*; Surgeon:GRACY GODWINIQ; [facility]     ESOPHAGOSCOPY, GASTROSCOPY, DUODENOSCOPY (EGD), COMBINED  2011    Procedure:COMBINED ESOPHAGOSCOPY, GASTROSCOPY, DUODENOSCOPY (EGD); Surgeon:PRASANNA  DALE COLVIN; [facility]     ESOPHAGOSCOPY, GASTROSCOPY, DUODENOSCOPY (EGD), COMBINED N/A 10/30/2014    Procedure: COMBINED ESOPHAGOSCOPY, GASTROSCOPY, DUODENOSCOPY (EGD), BIOPSY SINGLE OR MULTIPLE;  Surgeon: Sarai Moon MD;  [facility]     ESOPHAGOSCOPY, GASTROSCOPY, DUODENOSCOPY (EGD), COMBINED Left 7/6/2015    Procedure: COMBINED ESOPHAGOSCOPY, GASTROSCOPY, DUODENOSCOPY (EGD), BIOPSY SINGLE OR MULTIPLE;  Surgeon: Thomas Estrada MD;  [facility]     ESOPHAGOSCOPY, GASTROSCOPY, DUODENOSCOPY (EGD), COMBINED N/A 7/8/2016    Procedure: COMBINED ESOPHAGOSCOPY, GASTROSCOPY, DUODENOSCOPY (EGD), BIOPSY SINGLE OR MULTIPLE;  Surgeon: Eloy Klein MD;  [facility]     ESOPHAGOSCOPY, GASTROSCOPY, DUODENOSCOPY (EGD), COMBINED N/A 8/4/2016    Procedure: COMBINED ESOPHAGOSCOPY, GASTROSCOPY, DUODENOSCOPY (EGD), BIOPSY SINGLE OR MULTIPLE;  Surgeon: Jason Brown MD;  [facility]     ESOPHAGOSCOPY, GASTROSCOPY, DUODENOSCOPY (EGD), COMBINED N/A 8/1/2017    Procedure: COMBINED ESOPHAGOSCOPY, GASTROSCOPY, DUODENOSCOPY (EGD);;  Surgeon: Deirdre Harris MD;  [facility]     ESOPHAGOSCOPY, GASTROSCOPY, DUODENOSCOPY (EGD), COMBINED N/A 6/12/2019    Procedure: ESOPHAGOGASTRODUODENOSCOPY (EGD);  Surgeon: Jose Francisco Perdomo MD;  [facility]     GYN SURGERY      Hysterectomy and USO     [facility] ENDOSCOPY W EUS  7/20/2011    Procedure:COMBINED ENDOSCOPIC ULTRASOUND, ESOPHAGOSCOPY, GASTROSCOPY, DUODENOSCOPY (EGD); Surgeon:DARVIN DONOHUE; [facility]     HERNIORRHAPHY VENTRAL N/A 9/15/2016    Procedure: HERNIORRHAPHY VENTRAL;  Surgeon: Juanita Bernabe MD;  [facility]     HYSTERECTOMY  1997 or 1998    USO     INCISION AND DRAINAGE ABDOMEN WASHOUT, COMBINED  8/16/2012    Procedure: COMBINED INCISION AND DRAINAGE ABDOMEN WASHOUT;  ,debridement and Drainage Post Appendectomy;  Surgeon: Ron Austin MD;  [facility]     INJECT  TRANSVERSUS ABDOMINIS PLANE (TAP) BLOCK BILATERAL Bilateral 5/26/2016    Procedure: INJECT TRANSVERSUS ABDOMINIS PLANE (TAP) BLOCK BILATERAL;  Surgeon: Leonard Mccallum MD;  [facility]     LAPAROSCOPIC APPENDECTOMY  7/30/2012    Procedure: LAPAROSCOPIC APPENDECTOMY;  Open Appendectomy;  Surgeon: Ron Austin MD;  [facility]     PANCREATECTOMY, TRANSPLANT AUTO ISLET CELL, COMBINED  1/6/2012    Procedure:COMBINED PANCREATECTOMY, TRANSPLANT AUTO ISLET CELL; Total  Pancreatectomy, Auto Islet Transplant, splenectomy, 18fr. 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     Post-pancreatectomy diabetes (H)     Type 1 diabetes mellitus with hypoglycemia and without coma (H)     Migraine     Abdominal muscle strain     CIERRA (generalized anxiety disorder)     Major depressive disorder, recurrent episode, moderate (H)     CMC DJD(carpometacarpal degenerative joint disease), localized primary     Exocrine pancreatic insufficiency     Hypothyroidism     Hypoglycemia     Mood disorder due to a general medical condition     Decreased oral intake     Odynophagia     Iron deficiency     Anemia, iron deficiency     Adrenal insufficiency (H)     S/P hernia repair     Nausea     Chondromalacia of left patella     Gastrointestinal hemorrhage, unspecified gastrointestinal hemorrhage type     Age-related osteoporosis without current pathological fracture     Acute cystitis with hematuria  "    Gastroesophageal reflux disease without esophagitis     RUQ abdominal pain     Weight loss       ALL/Meds: Her medication and allergy histories were reviewed and are documented in their appropriate chart areas.    SH: Reviewed and documented in the appropriate area of the chart.  FH:  Her family history is reviewed and updated in the chart, today.  PMH: Her past medical, surgical, and obstetric histories were reviewed and updated today in the appropriate chart areas.    PE: /78   Ht 1.575 m (5' 2\")   Wt 60.3 kg (133 lb)   BMI 24.33 kg/m    Body mass index is 24.33 kg/m .    Pertinent Physical exam findings:    General Appearance:  healthy, alert, active, no distress  Abdomen: Benign, Soft, flat, non-tender, No masses, organomegaly, No inguinal nodes and Bowel sounds normoactive.   Pelvic:       - Ext: Vulva and perineum are normal without lesion, mass or discharge        - Urethra: normal without discharge or scarring no hypermobility       - Bladder: no tenderness, no masses       - Vagina:  atrophic and without discharge       - Cervix: atrophic       - Uterus:Normal shape, position and consistency       - Adnexa: Normal without masses or tenderness    I discussed the concerns related to post menopausal bleeding, including the association with endometrial hyperplasia and endometrial carcinoma.  We discussed the other potential causes for bleeding.  She has not had a pelvic ultrasound.    I discussed the concerns related to a thicker endometrial strip and the increased association of an endometrial abnormality with a thickness of greater than or equal to 5 mm.  She appears to understand.  The plan will be to proceed with pelvic ultrasound.  Discussed that given the stenotic nature of her pelvis and her cervix, I don't think an in office ENDOMETRIAL BIOPSY would be possible.  We would then plan a ENDOMETRIAL BIOPSY/D&C through outpatient surgery, so she would be comfortable.        A/P:(N95.0) " Postmenopausal bleeding  (primary encounter diagnosis)  Comment: 30 minutes spent on the date of the encounter doing chart review, review of outside records, patient visit and documentation   Plan: US Pelvic Complete with Transvaginal            (Z12.4) Cervical cancer screening  Comment:   Plan: Pap Screen with HPV - recommended age 30 - 65         years            (Z12.31) Visit for screening mammogram  Comment:   Plan: MA SCREENING DIGITAL BILAT - Future  (s+30)                 -     -   Orders Placed This Encounter   Procedures     MA SCREENING DIGITAL BILAT - Future  (s+30)

## 2023-03-09 NOTE — TELEPHONE ENCOUNTER
Central Prior Authorization Team   Phone: [phone]    PA Initiation    Medication: hydrocortisone sodium succinate PF (SOLU-CORTEF) 100 MG injection  Insurance Company: [facility] - Phone [phone] Fax [phone]  Pharmacy Filling the Rx: [id number] - [place], [place] - [address]  Filling Pharmacy Phone: [phone]  Filling Pharmacy Fax:    Start Date:

## 2023-03-12 NOTE — DISCHARGE SUMMARY
[facility], [place]    Internal Medicine Discharge Summary- Sydni Service    Date of Admission:  9/3/2018  Date of Discharge:  9/7/2018  6:00 PM  Discharging Attending Provider: Dr. Leonard Millard  Discharge Team: Sydni 5    Discharge Diagnoses    - Hypoglycemia   - Abdominal Pain    - Pancreatectomy w/ autoislet cell transplant   - Type 1 DM   - CIERRA   - Hypothyroidism    Follow-ups Needed After Discharge     - Discontinued insulin pump on discharge   - Started on TF on discharge via NJ tube   - Follow up with endocrine as outpatient   - Continue 10mg BID hydrocortisone   - Follow up with PCP and reassess need for outpatient pain clinic     Hospital Course   Chantell Kidd was admitted on 9/3/2018 for hypoglycemia.  The following problems were addressed during her hospitalization:    # Hypoglycemia  # Type 1 DM 2/2 pancreatectomy  # Autoislet cell transplant  BS 40s at home, labile in ED dropping down in 50's. Recently needed steroids increased to 20mg BID to keep sugars up. C peptide decreased with elevated blood insulin level consistent with exogenous insulin - although should have been metabolized out of the body. Endocrine involved during hospitalization. Discharged on TF at 40ml/hr via NJ tube. Discontinued insulin pump on discharge.     # Lactic acidosis  # Leukocytosis   2.2 on admission --> 0.9 with fluids. WBC 22 on admission. Likely secondary to dehydration and hypoglycemia along with inflammatory response to hypoglycemia. No other sign of systemic infection. No antibiotics.  UA with large LE, no nitrites and few WBC without dysuria - s/p 1 dose IV ceftriaxone while waiting for culture - NGTD.     # Discharge Pain Plan:    - During her hospitalization, Chantell experienced pain due to chronic abdominal and back pain.  The pain plan for discharge was discussed with Chantell and the plan was created in a collaborative fashion.     - diclofenac patches   - recommend outpatient  reassessment by pain clinic.     Consultations This Hospital Stay   ENDOCRINE DIABETES ADULT IP CONSULT  VASCULAR ACCESS CARE ADULT IP CONSULT  PHYSICAL THERAPY ADULT IP CONSULT  NUTRITION SERVICES ADULT IP CONSULT  PHARMACY IP CONSULT  PATIENT LEARNING CENTER IP CONSULT    Code Status   Full Code       The patient was discussed with Dr. Freeman Severino MD  [facility]  Pager: [phone]  ______________________________________________________________________    Physical Exam   Vital Signs: Temp: 98.2  F (36.8  C) Temp src: Oral BP: 110/73 Pulse: 82 Heart Rate: 65 Resp: 16 SpO2: 98 % O2 Device: None (Room air)    Weight: 141 lbs 3.2 oz    Gen: NAD, alert, pleasant, cooperative, non-toxic  HEENT: EOMI, no scleral icterus, NJ present  Resp: CTAB,   Cardiac: RRR, no M/R/G appreciated  GI: soft, non-tender, non-distended, normoactive bowel sounds  Ext: WWP, no edema  Neuro: AOx3, appropriate mentation    Significant Results and Procedures   Most Recent 3 CBC's:  Recent Labs   Lab Test  09/04/18   0632  09/03/18   0855  08/29/18   0521   WBC  11.5*  22.8*  9.0   HGB  12.2  14.2  11.8   MCV  93  93  92   PLT  333  351  290     Most Recent 3 BMP's:  Recent Labs   Lab Test  09/07/18   0655  09/06/18   0635  09/05/18   0627   NA  140  140  140   POTASSIUM  3.7  3.8  3.7   CHLORIDE  102  105  107   CO2  29  26  25   BUN  18  18  11   CR  1.00  0.87  0.92   ANIONGAP  9  9  8   ELIZA  9.1  8.8  8.5   GLC  118*  120*  101*     Most Recent 2 LFT's:  Recent Labs   Lab Test  08/27/18   0936  08/26/18   2236   AST  42  35   ALT  38  43   ALKPHOS  100  106   BILITOTAL  0.4  0.2   ,   Results for orders placed or performed during the hospital encounter of 09/03/18   XR Chest Port 1 View    Narrative    Exam: XR CHEST PORT 1 VW, 9/3/2018 8:21 AM    Indication: hypoglycemia, eval for occult pna;     Comparison: 7/6/2016    Findings:   Single frontal view of the chest. The trachea is midline.  The  cardiomediastinal silhouette is within normal limits. There is no  pneumothorax or pleural effusion. No focal pulmonary opacities.  Calcified granuloma. Cholecystectomy clips. Epigastric clips. The  visualized upper abdomen is otherwise unremarkable. No acute osseous  abnormality.      Impression    Impression:   No acute cardiopulmonary abnormality.    I have personally reviewed the examination and initial interpretation  and I agree with the findings.    ZENA BERNARDO MD   XR Feeding Tube Placement    Narrative    Exam:  Fluoroscopically guided placement of feeding  9/5/2018 3:11 PM     Comparison: CT 8/26/2018    History: Feeding tube needed for nutrition. History of gastric bypass.    Technique: After injection of Xylocaine gel into the nose, a feeding  tube was advanced under fluoroscopic guidance.    Fluoroscopy time: 5:49 minutes.    Findings: The feeding tube is advanced with the tip at the gastric  pouch. A small amount of barium was injected to define anatomy.      Impression    Impression: Uncomplicated feeding tube placement with sidehole in the  gastric pouch.    I, ERLINDA WARNER MD, attest that I was present for all critical  portions of the procedure and was immediately available to provide  guidance and assistance during the remainder of the procedure.        I have personally reviewed the examination and initial interpretation  and I agree with the findings.    ERLINDA WARNER MD     *Note: Due to a large number of results and/or encounters for the requested time period, some results have not been displayed. A complete set of results can be found in Results Review.       Pending Results   These results will be followed up by Endocrine  Unresulted Labs Ordered in the Past 30 Days of this Admission     Date and Time Order Name Status Description    9/3/2018 1757 Sulfonylurea Screen In process              Primary Care Physician   Ron Morgan    Discharge Disposition    Discharged to home  Condition at discharge: Stable    Discharge Orders     Reason for your hospital stay   You were hospitalized for low blood sugars. You were given sugar via IV. Your sugars were stable upon discharge. You were seen by endocrinology while you are in the hospital. Do not restart your pump on discharge. You had a feeding tube placed and will discharge on tube feeds.     Adult [facility] Follow-up and recommended labs and tests   Follow up with primary care provider, Ron Morgan, within 7 days to evaluate medication change and to evaluate treatment change.  The following labs/tests are recommended: Phos, BMP, monitor Tube Feeds.      Appointments on [place] and/or [facility] (with [facility] or [facility] provider or service). Call [phone] if you haven't heard regarding these appointments within 7 days of discharge.     Activity   Your activity upon discharge: activity as tolerated     When to contact your care team   Call your primary doctor if you have any of the following: temperature greater than 100.4 or less than 95 or increased pain.     Tubes and drains   You are going home with the following tubes or drains: feeding tube NJ-Tube.   Tube cares per hospital or home care instructions     Full Code     Diet   Follow this diet upon discharge: Orders Placed This Encounter     Calorie Counts     Snacks/Supplements Adult: Boost Breeze; Between Meals     Adult Formula Drip Feeding: Continuous Nutren 1.5; Nasoduodenal tube; Goal Rate: 40; mL/hr; Medication - Tube Feeding Flush Frequency: At least 15-30 mL water before and after medication administration and with tube clogging; Amount to Send (Nutri...     Combination Diet Regular Diet Adult       Discharge Medications   Current Discharge Medication List      START taking these medications    Details   clotrimazole (MYCELEX) 10 MG LOZG lozenge Place 1 lozenge (1 Brenda) inside cheek 4 times daily  Qty: 70 each, Refills: 0    Associated  Diagnoses: Thrush      order for DME by [employer] [facility] route [place], [place]  Ph: [phone]  Fax: [phone]    Nutren 1.5 @ 10 ml/hr with advancement by 10 ml/hr q 8 hours to goal rate of 40 ml/hr.  This will provide 1440 kcals (27 kcal/kg/day), 65 g PRO (1.2 g/kg/day), 730 mL H2O, 169 g CHO and no Fiber daily.     Qty: 1 Month, Refills: 3    Associated Diagnoses: Hypoglycemia; Nausea; Decreased oral intake; Exocrine pancreatic insufficiency; Type 1 diabetes mellitus with hypoglycemia and without coma (H); Generalized abdominal pain; Post-pancreatectomy diabetes (H); Cell transplant; On enteral nutrition         CONTINUE these medications which have NOT CHANGED    Details   ACCU-CHEK MULTICLIX LANCETS MISC by Lancet route. Use to test blood sugar daily or as directed.  Qty: 102 each, Refills: prn    Associated Diagnoses: Chronic abdominal pain      acetaminophen 500 MG CAPS Take 1,000 mg by mouth three times a day as needed.      alendronate (FOSAMAX) 70 MG tablet Take 1 tablet (70 mg) by mouth every 7 days On Sundays take first thing in the morning with plain water and remain upright for at least 30 minutes and until after first food of the day    Do not restart Fosamax (alendronate) until your difficulty swallowing has resolved and you have finished the entire course of fluconazole (Diflucan).  Qty: 4 tablet, Refills: 0    Associated Diagnoses: Osteoporosis      alum & mag hydroxide-simethicone (MYLANTA/MAALOX) 200-200-25 MG CHEW chewable tablet Take 1 tablet by mouth 3 times daily as needed for indigestion      amylase-lipase-protease (CREON 12) 51919 units CPEP Take 6 to 8 capsules by mouth with meals and take 4 capsules with snacks      aspirin 81 MG tablet Take 81 mg by mouth daily.      !! blood glucose monitoring (NOEMI CONTOUR NEXT) test strip Use to test blood sugar 8 times daily.  Qty: 240 each, Refills: 11    Associated Diagnoses: Post-pancreatectomy diabetes (H)      !!  blood glucose monitoring (NO BRAND SPECIFIED) test strip Use to test blood glucoses 6-8 times per day.  Qty: 240 each, Refills: 11    Associated Diagnoses: Post-pancreatectomy diabetes (H)      cyclobenzaprine (FLEXERIL) 5 MG tablet Take 10 mg by mouth 2 times daily as needed for muscle spasms      diclofenac (VOLTAREN) 1 % GEL 2 g Apply 2 g to skin four times a day as needed (to affected upper extremity joint(s)). Maximum 8g/day per joint, 16g/day total.      dicyclomine (BENTYL) 10 MG capsule TAKE ONE CAPSULE BY MOUTH EVERY 6 HOURS AS NEEDED  Qty: 40 capsule, Refills: 3    Associated Diagnoses: Abdominal pain, epigastric      dronabinol (MARINOL) 2.5 MG capsule Take 2 capsules (5 mg) by mouth 2 times daily as needed  Qty: 56 capsule, Refills: 0    Associated Diagnoses: Routine health maintenance      !! DULoxetine (CYMBALTA) 30 MG EC capsule Take 1 capsule (30 mg) by mouth daily With 60mg capsule for total dose of 90mg  Qty: 90 capsule, Refills: 1    Associated Diagnoses: Major depressive disorder, recurrent episode, moderate (H); CIERRA (generalized anxiety disorder)      !! DULoxetine (CYMBALTA) 60 MG EC capsule Take 1 capsule (60 mg) by mouth daily With 30mg capsule for total dose of 90mg  Qty: 90 capsule, Refills: 1    Associated Diagnoses: Major depressive disorder, recurrent episode, moderate (H); CIERRA (generalized anxiety disorder)      fluconazole (DIFLUCAN) 200 MG tablet Take 2 tabs the first day and 1 tab for the next 13 days  Qty: 15 tablet, Refills: 0      furosemide (LASIX) 20 MG tablet Take 1 tablet (20 mg) by mouth 2 times daily  Qty: 180 tablet, Refills: 2    Associated Diagnoses: Edema, unspecified type      glucagon (GLUCAGON EMERGENCY) 1 MG kit Inject 1 mg into the muscle once for 1 dose  Qty: 1 mg, Refills: 1    Associated Diagnoses: Hypoglycemia unawareness in type 1 diabetes mellitus (H); Type 1 diabetes mellitus with hypoglycemic coma (H)      hydrocortisone (CORTEF) 10 MG tablet Take 10 mg in  the morning and 10 mg at bedtime. Watch for hypoglycemia recurrence.  Qty: 60 tablet, Refills: 3    Associated Diagnoses: Hypoglycemia; Adrenal insufficiency (H)      insulin pen needle needle RX# [id number]   Pen Needle UC 31G UF IV Mini   Use 4-8 needles per day for insulin injections.      Qty: 2 Box, Refills: 11    Associated Diagnoses: Chronic abdominal pain      levothyroxine (SYNTHROID/LEVOTHROID) 112 MCG tablet Take 1 tablet (112 mcg) by mouth daily  Qty: 30 tablet, Refills: 0    Comments: Must see PCP for further refills. Appointment and labs due.  Associated Diagnoses: Hypothyroidism      linaclotide (LINZESS) 145 MCG capsule Take 1 capsule (145 mcg) by mouth every morning (before breakfast)  Qty: 30 capsule, Refills: 0    Comments: Must see PCP for further refills.  Associated Diagnoses: Constipation, unspecified constipation type; Chronic back pain, unspecified back location, unspecified back pain laterality; Physical deconditioning; Primary insomnia      metoclopramide (REGLAN) 5 MG tablet Take 5 mg by mouth 2 times daily as needed      Nutritional Supplements (BOOST HIGH PROTEIN) LIQD Also can use Ensure clear (available over the counter)  Refills: 0    Associated Diagnoses: Pancreatic insufficiency      nystatin (MYCOSTATIN) 96994 unit/0.5mL SUSP Take 1 mL (100,000 Units) by mouth 4 times daily  Qty: 60 mL, Refills: 1    Associated Diagnoses: Odynophagia      ondansetron (ZOFRAN-ODT) 4 MG ODT tab DISSOLVE ONE TABLET ON THE TONGUE EVERY 6 HOURS AS NEEDED FOR NAUSEA AND VOMITING  Qty: 60 tablet, Refills: 2    Associated Diagnoses: Nausea      pantoprazole (PROTONIX) 40 MG enteric coated tablet Take 1 tablet (40 mg) by mouth 2 times daily  Qty: 180 tablet, Refills: 3    Associated Diagnoses: H. pylori infection      polyethylene glycol (MIRALAX/GLYCOLAX) packet Take 1 packet by mouth 2 times daily as needed for constipation   Qty: 14 each, Refills: 5    Associated Diagnoses: Chronic constipation       potassium chloride SA (K-DUR/KLOR-CON M) 20 MEQ CR tablet Take 1 tablet (20 mEq) by mouth daily  Qty: 90 tablet, Refills: 1    Associated Diagnoses: Hypokalemia      pregabalin (LYRICA) 150 MG capsule Take 1 capsule (150 mg) by mouth 3 times daily  Qty: 90 capsule, Refills: 5    Associated Diagnoses: Chronic generalized abdominal pain      senna-docusate (SENOKOT-S;PERICOLACE) 8.6-50 MG per tablet Take 2 tablets by mouth daily      SUMAtriptan (IMITREX) 50 MG tablet Take 1 tablet (50 mg) by mouth at onset of headache for migraine Take 1 Tab by mouth Once as needed for Migraine Headache. May repeat after two hours.  Maximum dose 200 mg/24 hours.  Qty: 30 tablet, Refills: 1    Associated Diagnoses: Migraine      topiramate (TOPAMAX) 100 MG tablet Take 1 tablet (100 mg) by mouth 2 times daily  Qty: 180 tablet, Refills: 1    Associated Diagnoses: Migraine, unspecified, not intractable, without status migrainosus      traZODone (DESYREL) 100 MG tablet TAKE 1-2 TABLETS BY MOUTH AN HOUR BEFORE BEDTIME  Qty: 100 tablet, Refills: 1    Associated Diagnoses: Primary insomnia; Constipation, unspecified constipation type; Chronic back pain, unspecified back location, unspecified back pain laterality; Physical deconditioning       !! - Potential duplicate medications found. Please discuss with provider.        Allergies   Allergies   Allergen Reactions     Corticosteroids Other (See Comments)     All oral,IV and injectable steroids are contraindicated (unless in life threatening situations) in [employer]let [employer] transplant recipients. They can cause irreversible loss of islet cell function. Please contact patients transplant care coordinator Malini ORDAZ @[phone]/Pager [phone]  and Endocrinologist prior to administration.     Chocolate Flavor Rash     Breaks out when eats chocolate      Private car

## 2023-03-13 DIAGNOSIS — Z90.410 POST-PANCREATECTOMY DIABETES (H): Primary | ICD-10-CM

## 2023-03-13 DIAGNOSIS — E10.649 TYPE 1 DIABETES MELLITUS WITH HYPOGLYCEMIA AND WITHOUT COMA (H): ICD-10-CM

## 2023-03-13 DIAGNOSIS — E13.9 POST-PANCREATECTOMY DIABETES (H): Primary | ICD-10-CM

## 2023-03-13 DIAGNOSIS — E89.1 POST-PANCREATECTOMY DIABETES (H): Primary | ICD-10-CM

## 2023-03-13 LAB
BKR LAB AP GYN ADEQUACY: NORMAL
BKR LAB AP GYN INTERPRETATION: NORMAL
BKR LAB AP HPV REFLEX: NORMAL
BKR LAB AP PREVIOUS ABNORMAL: NORMAL
PATH REPORT.COMMENTS IMP SPEC: NORMAL
PATH REPORT.COMMENTS IMP SPEC: NORMAL
PATH REPORT.RELEVANT HX SPEC: NORMAL

## 2023-03-13 NOTE — TELEPHONE ENCOUNTER
Prior Authorization Not Allowed per Insurance, Medication Exclusion from patients pharmacy benefit    Medication: hydrocortisone sodium succinate PF (SOLU-CORTEF) 100 MG injection-PA NOT NEEDED/ALLOWED   Insurance Company: [employer] [place] - Phone [phone] Fax [phone]  Expected CoPay:      Pharmacy Filling the Rx: -[id number] - [place], [place] - [address]  Pharmacy Notified: No  Patient Notified: No    Insurance stated that requested medication is exclusion from patients pharmacy benefit. Medication requested maybe eligible for coverage under Medical Benefit. Excluded medication, No PA allowed and no information on Appeal was provided.

## 2023-03-15 LAB
HUMAN PAPILLOMA VIRUS 16 DNA: NEGATIVE
HUMAN PAPILLOMA VIRUS 18 DNA: NEGATIVE
HUMAN PAPILLOMA VIRUS FINAL DIAGNOSIS: NORMAL
HUMAN PAPILLOMA VIRUS OTHER HR: NEGATIVE

## 2023-03-22 NOTE — TELEPHONE ENCOUNTER
Spoke to patient to relay that the provider is out if clinic for another week and will be unable to write the letter for the patient. Patient will need to come in for an appointment to be reevaluated. Scheduled appointment for patient to be seen 7/12/19. Anna Marie Mcdowell LPN 7/11/2019 2:00 PM     Valtrex Counseling: I discussed with the patient the risks of valacyclovir including but not limited to kidney damage, nausea, vomiting and severe allergy.  The patient understands that if the infection seems to be worsening or is not improving, they are to call.

## 2023-03-29 NOTE — TELEPHONE ENCOUNTER
Good Afternoon,    Following up on the message below as the [employer] message has not been read.    Thanks!    Selma Leahy    Financial Counselor  [address] [place] [address]  Phone- [phone]  Fax- [phone]

## 2023-03-31 NOTE — TELEPHONE ENCOUNTER
Called patient. Left message stating we have a few questions regarding insurance approval of gel injections. Instructed her to give us a call back or take a look at her [employer] messages.     Nila CHAVEZ EMT

## 2023-04-04 ENCOUNTER — OFFICE VISIT (OUTPATIENT)
Dept: GASTROENTEROLOGY | Facility: CLINIC | Age: 58
End: 2023-04-04
Payer: COMMERCIAL

## 2023-04-04 VITALS
HEART RATE: 74 BPM | BODY MASS INDEX: 24.01 KG/M2 | HEIGHT: 62 IN | OXYGEN SATURATION: 99 % | WEIGHT: 130.5 LBS | DIASTOLIC BLOOD PRESSURE: 67 MMHG | SYSTOLIC BLOOD PRESSURE: 124 MMHG

## 2023-04-04 DIAGNOSIS — R11.2 NAUSEA AND VOMITING, UNSPECIFIED VOMITING TYPE: Primary | ICD-10-CM

## 2023-04-04 DIAGNOSIS — R10.84 GENERALIZED ABDOMINAL PAIN: ICD-10-CM

## 2023-04-04 DIAGNOSIS — E89.1 POST-PANCREATECTOMY DIABETES (H): ICD-10-CM

## 2023-04-04 DIAGNOSIS — E13.9 POST-PANCREATECTOMY DIABETES (H): ICD-10-CM

## 2023-04-04 DIAGNOSIS — Z90.410 POST-PANCREATECTOMY DIABETES (H): ICD-10-CM

## 2023-04-04 DIAGNOSIS — E10.649 TYPE 1 DIABETES MELLITUS WITH HYPOGLYCEMIA AND WITHOUT COMA (H): ICD-10-CM

## 2023-04-04 PROCEDURE — 99204 OFFICE O/P NEW MOD 45 MIN: CPT | Performed by: PHYSICIAN ASSISTANT

## 2023-04-04 ASSESSMENT — PAIN SCALES - GENERAL: PAINLEVEL: EXTREME PAIN (8)

## 2023-04-04 NOTE — NURSING NOTE
"Chief Complaint   Patient presents with     New Patient     New consult abdominal pain, acid reflux, nausea and vomiting.     She requests these members of her care team be copied on today's visit information:  PCP: Ron Morgan    Referring Provider:  Amena Maher MD  [id number] [place] [address]    Vitals:    04/04/23 1025   BP: 124/67   BP Location: Left arm   Patient Position: Sitting   Cuff Size: Adult Regular   Pulse: 74   SpO2: 99%   Weight: 59.2 kg (130 lb 8 oz)   Height: 1.575 m (5' 2\")     Body mass index is 23.87 kg/m .        Danielle Almonte CMA    "

## 2023-04-04 NOTE — LETTER
4/4/2023         RE: Chantell Kidd  [address]  [facility] [address]        Dear Colleague,    Thank you for referring your patient, Chantell Kidd, to the [facility]. Please see a copy of my visit note below.    NEW PATIENT GI CLINIC VISIT    CC/REFERRING MD:  Amena Maher  REASON FOR CONSULTATION:   Amena Maher for   Chief Complaint   Patient presents with     New Patient     New consult abdominal pain, acid reflux, nausea and vomiting.       ASSESSMENT/PLAN: Patient here for evaluation of symptoms of chronic nausea, abdominal pain, and concerns about nutritional deficiency and unintentional weight loss.  She reports her symptoms starting in October, but through chart review, she really has had the symptoms more chronically over the past 10 years.  She has had extensive GI work-up for the symptoms.  In trending her weight over time, she reached a zach of 116 pounds last September, but since then, she has improved up to 130 pounds and has been able to maintain that over the past few months.  Her peak weight over the last several years was 153 pounds in 2020.  Her recent laboratory testing did not show any significant nutritional deficiencies.    Reviewing her current symptoms, I think it would be reasonable to pursue gastric emptying scan, given longstanding history of diabetes, to rule out gastroparesis.    The etiology of her abdominal pain remains unclear.  We can pursue a more detailed imaging study of her area of pain to evaluate for any anatomical/structural cause.  Gastroparesis could be playing a role with that pain as well.    She has had limited effectiveness of her antiemetic medications.  I encouraged her to keep trying to eat small meals throughout the day and stick to foods that she tolerates.  She has worked with nutrition in the past on this as well.  I will plan for follow-up with her after I see these imaging studies.  I will also be reaching out to my  colleagues in pancreatitis she for further guidance.      RTC 4 weeks    Thank you for this consultation.  It was a pleasure to participate in the care of this patient; please contact us with any further questions.      50 minutes spent on the date of the encounter doing chart review, patient visit and documentation    This note was created with voice recognition software, and while reviewed for accuracy, typos may remain.     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  Since this procedure, she has had continued issues with chronic abdominal pain, nausea/vomiting, and other nutritional deficiencies.  She has had multiple CTs, MRCPs, abdominal ultrasounds, EGDs, and colonoscopies as part of her work-up.  Due to nausea and weight loss, she has had GJ tube placed in the past, most recently in [id number].    Her most recent upper endoscopy was in 2019 and was notable for pyloric stenosis.  Subsequent upper GI series did not identify a definite stricture.  Her most recent colonoscopy was in 2017 and prep was suboptimal, but no abnormalities were noted and 10-year follow-up was recommended.  CT scan was done in October 2022 that was notable for moderate colonic stool and otherwise no specific findings to suggest cause of abdominal pain.    Currently, she describes daily nausea.  This leads to vomiting 2-3 times daily.  Typically it is bilious vomiting, has not had hematemesis or coffee-ground emesis.  She is trying to eat small meals throughout the day.  She is concerned that she has been losing weight and is worried that she may need tube feeding again.  She did have a number of  laboratory tests done recently with endocrinology that did not identify any specific nutritional deficiencies at this time.  She has been prescribed dronabinol, ondansetron, and metoclopramide to treat her nausea, and states that she has used these medications and they are not working.  She also continues on pantoprazole and feels like her reflux is generally well controlled, but does have some breakthrough symptoms at times.    She has also been experiencing a hernia like sensation in the right mid abdomen.  When she gets episodes of pain, it feels like there is a fist bulging through her abdomen.  She did see a surgeon a couple months ago and there was no concern for any herniation or need for surgical intervention.  Her episodes of pain are somewhat random, not related to dietary intake.    ROS:    10 point ROS neg other than the symptoms noted above in the HPI.    PREVIOUS ENDOSCOPY:  Reviewed, see HPI    PERTINENT RELEVANT IMAGING OR LABS:  Reviewed, see HPI    ALLERGIES:     Allergies   Allergen Reactions     Corticosteroids Other (See Comments)     All oral, IV and injectable steroids are contraindicated (unless in life threatening situations) in Islet Auto transplant recipients. They can cause irreversible loss of islet cell function. Please contact patient's transplant care coordinator YURI Cortez RN at [phone]/pager [phone] and/or endocrinologist prior to administration.       Chocolate Flavor Rash     Breaks out when eats chocolate       PERTINENT MEDICATIONS:    Current Outpatient Medications:      acetaminophen (TYLENOL) 325 MG tablet, Take 3 tablets (975 mg) by mouth every 8 hours, Disp:  , Rfl:      Acetone, Urine, Test (KETONE TEST) STRP, 1 each by In Vitro route as needed (hyperglycemia), Disp: 50 strip, Rfl: 0     alendronate (FOSAMAX) 70 MG tablet, Take 1 tablet (70 mg) by mouth every 7 days On Sundays take first thing in the morning with plain water and remain upright for at least 30  minutes and until after first food of the day  Do not restart Fosamax (alendronate) until your difficulty swallowing has resolved and you have finished the entire course of fluconazole (Diflucan)., Disp: 4 tablet, Rfl: 0     alum & mag hydroxide-simethicone (MYLANTA/MAALOX) 200-200-25 MG CHEW chewable tablet, Take 1 tablet by mouth 3 times daily as needed for indigestion, Disp: , Rfl:      amylase-lipase-protease (CREON 12) 18166 units CPEP, Take 6 to 8 capsules by mouth with meals and take 4 capsules with snacks. Needs up to 25 capsules per day, Disp: 750 capsule, Rfl: 5     Continuous Blood Gluc  (DEXCOM G6 ) RICARDO, Use to read blood sugars as per 's instructions., Disp: 1 each, Rfl: 0     Continuous Blood Gluc Sensor (DEXCOM G6 SENSOR) MISC, Change every 10 days., Disp: 9 each, Rfl: 3     Continuous Blood Gluc Transmit (DEXCOM G6 TRANSMITTER) MISC, Change every 3 months., Disp: 1 each, Rfl: 3     CONTOUR NEXT TEST test strip, USE TO TEST BLOOD SUGAR 6 TIMES DAILY OR AS DIRECTED, Disp: 600 strip, Rfl: 1     cyclobenzaprine (FLEXERIL) 10 MG tablet, Take 10 mg by mouth 3 times daily as needed for muscle spasms, Disp: , Rfl:      cyclobenzaprine (FLEXERIL) 5 MG tablet, Take 1 tablet (5 mg) by mouth 3 times daily as needed for muscle spasms, Disp: 90 tablet, Rfl: 0     diclofenac (FLECTOR) 1.3 % Patch, Place 1 patch onto the skin 2 times daily, Disp: 30 each, Rfl: 1     diclofenac (VOLTAREN) 1 % GEL, 2 g Apply 2 g to skin four times a day as needed (to affected upper extremity joint(s)). Maximum 8g/day per joint, 16g/day total., Disp: , Rfl:      dicyclomine (BENTYL) 10 MG capsule, Take 10 mg by mouth every 6 hours, Disp: , Rfl:      dronabinol (MARINOL) 2.5 MG capsule, Take 2 capsules (5 mg) by mouth 2 times daily as needed, Disp: 56 capsule, Rfl: 5     DULoxetine (CYMBALTA) 30 MG capsule, Take 1 capsule (30 mg) by mouth daily With 60mg capsule for total dose of 90mg, Disp: 90 capsule, Rfl:  0     DULoxetine (CYMBALTA) 60 MG EC capsule, Take 1 capsule (60 mg) by mouth daily With 30mg capsule for total dose of 90mg, Disp: 90 capsule, Rfl: 1     famotidine (PEPCID) 20 MG tablet, Take 1 tablet (20 mg) by mouth At Bedtime, Disp: 30 tablet, Rfl: 0     Glucagon (GVOKE HYPOPEN 2-PACK) 1 MG/0.2ML pen, Inject the contents of 1 device under the skin into lower abdomen, outer thigh, or outer upper arm as needed for hypoglycemia. If no response after 15 minutes, additional 1 mg dose from a new device may be injected while waiting for emergency assistance., Disp: 0.4 mL, Rfl: 0     hydrocortisone (CORTAID) 1 % external cream, Apply topically 3 times daily, Disp: 45 g, Rfl: 3     hydrocortisone (CORTEF) 10 MG tablet, Take 10 mg in the morning and 10 mg along with a 5 mg tablet at bedtime for a total dose of 15 mg at bedtime. Watch for hypoglycemia recurrence., Disp: , Rfl:      hydrocortisone (CORTEF) 5 MG tablet, Take 5 mg by mouth At Bedtime along with a 10 mg tablet for a total dose of 15 mg, Disp: , Rfl:      hydrocortisone sodium succinate PF (SOLU-CORTEF) 100 MG injection, Inject 100mg (1 mL) into muscle in emergency or unable to take oral hydrocortisone. Go to emergency room if given. ActoVial NDC [address]-75. Note to pharmacy: Provide two 3ml syringes with 23g 1 inch needles., Disp: 1 mL, Rfl: 0     Injection Device for insulin (NOVOPEN ECHO) RICARDO, Use with   Insulin, Disp: 1 each, Rfl: 0     insulin aspart (NOVOLOG VIAL) 100 UNITS/ML vial, Via insulin pump. Approx 60 units daily., Disp: 60 mL, Rfl: 1     insulin aspart (NOVOLOG VIAL) 100 UNITS/ML vial, USE TO FILL INSULIN PUMP RESERVOIR. NEEDS 150 UNITS EVERY 3 DAYS. CALL [facility] TO SCHEDULE FOLLOW UP APPOINTMENT, Disp: 10 mL, Rfl: 1     insulin aspart (NOVOPEN ECHO) 100 UNIT/ML cartridge, As  instructed per physician, Disp: 5 mL, Rfl: 0     Insulin Disposable Pump (OMNIPOD 5 G6 INTRO, GEN 5,) KIT, 1 each See Admin Instructions Use continuously to infuse  insulin., Disp: 1 kit, Rfl: 0     Insulin Disposable Pump (OMNIPOD 5 G6 POD, GEN 5,) MISC, 1 each See Admin Instructions Change pod every 2-3 days., Disp: 35 each, Rfl: 3     levothyroxine (SYNTHROID/LEVOTHROID) 112 MCG tablet, Take 1 tablet (112 mcg) by mouth daily, Disp: 90 tablet, Rfl: 3     linaclotide (LINZESS) 145 MCG capsule, Take 145 mcg by mouth every morning (before breakfast) as needed, Disp: , Rfl:      lipase-protease-amylase (CREON) 95535-669[address]-95818 units CPEP, Take 6 to 8 capsules with every meal, and 4 capsules with snacks. Up to 25 capsules per day, Disp: 750 capsule, Rfl: 5     metoclopramide (REGLAN) 5 MG tablet, Take 2 tablets (10 mg) by mouth 3 times daily, Disp: 180 tablet, Rfl: 3     Nutritional Supplements (BOOST HIGH PROTEIN) LIQD, After above baseline labs are drawn, give: 6 mL/kg to maximum of 360 mL; the beverage is to be consumed within 5 minutes. (Patient taking differently: as needed After above baseline labs are drawn, give: 6 mL/kg to maximum of 360 mL; the beverage is to be consumed within 5 minutes.), Disp: , Rfl: 0     ondansetron (ZOFRAN) 4 MG tablet, Take 1 tablet (4 mg) by mouth every 8 hours as needed for nausea, Disp: 30 tablet, Rfl: 0     ondansetron (ZOFRAN-ODT) 4 MG ODT tab, DISSOLVE ONE TABLET ON THE TONGUE EVERY 6 HOURS AS NEEDED FOR NAUSEA AND VOMITING, Disp: 60 tablet, Rfl: 2     order for DME, by [employer] [place], [place] Ph: [phone] Fax: [phone]  Nutren 1.5 @ 10 ml/hr with advancement by 10 ml/hr q 8 hours to goal rate of 40 ml/hr.  This will provide 1440 kcals (27 kcal/kg/day), 65 g PRO (1.2 g/kg/day), 730 mL H2O, 169 g CHO and no Fiber daily.  , Disp: 1 Month, Rfl: 3     pantoprazole (PROTONIX) 40 MG EC tablet, Take 1 tablet (40 mg) by mouth 2 times daily, Disp: 180 tablet, Rfl: 3     polyethylene glycol (MIRALAX/GLYCOLAX) packet, Take 1 packet by mouth 2 times daily as needed for constipation , Disp: 14 each, Rfl: 5      potassium chloride ER (KLOR-CON M) 20 MEQ CR tablet, Take 1 tablet (20 mEq) by mouth daily, Disp: 90 tablet, Rfl: 1     senna-docusate (SENOKOT-S/PERICOLACE) 8.6-50 MG tablet, Take 1-2 tablets by mouth daily as needed for constipation, Disp: 60 tablet, Rfl: 3     sodium chloride (OCEAN) 0.65 % nasal spray, Spray 1 spray into both nostrils daily as needed for congestion, Disp: 30 mL, Rfl: 0     sucralfate (CARAFATE) 1 GM tablet, Take 1 tablet (1 g) by mouth 4 times daily (before meals and nightly), Disp: 30 tablet, Rfl: 0     SUMAtriptan (IMITREX) 50 MG tablet, Take 1 tablet (50 mg) by mouth at onset of headache for migraine Take 1 Tab by mouth Once as needed for Migraine Headache. May repeat after two hours.  Maximum dose 200 mg/24 hours., Disp: 30 tablet, Rfl: 1     topiramate (TOPAMAX) 100 MG tablet, Take 1 tablet (100 mg) by mouth 2 times daily, Disp: 180 tablet, Rfl: 1     traMADol (ULTRAM) 50 MG tablet, Take 0.5-1 tablets (25-50 mg) by mouth every 6 hours as needed for moderate pain, Disp: 15 tablet, Rfl: 0     traZODone (DESYREL) 100 MG tablet, TAKE 1-2 TABLETS BY MOUTH AN HOUR BEFORE BEDTIME, Disp: 100 tablet, Rfl: 2    PROBLEM LIST  Patient Active Problem List    Diagnosis Date Noted     Acute cystitis with hematuria 09/26/2022     Priority: Medium     Gastroesophageal reflux disease without esophagitis 09/26/2022     Priority: Medium     RUQ abdominal pain 09/26/2022     Priority: Medium     Weight loss 09/26/2022     Priority: Medium     Age-related osteoporosis without current pathological fracture 11/12/2020     Priority: Medium     Gastrointestinal hemorrhage, unspecified gastrointestinal hemorrhage type 11/08/2020     Priority: Medium     Chondromalacia of left patella 07/06/2018     Priority: Medium     Nausea 11/17/2016     Priority: Medium     Adrenal insufficiency (H) 09/15/2016     Priority: Medium     S/P hernia repair 09/15/2016     Priority: Medium     Anemia, iron deficiency 08/31/2016      Priority: Medium     Iron deficiency 08/09/2016     Priority: Medium     Odynophagia 08/02/2016     Priority: Medium     Decreased oral intake 07/06/2016     Priority: Medium     Mood disorder due to a general medical condition 03/29/2016     Priority: Medium     Hypoglycemia 03/15/2016     Priority: Medium     Hypothyroidism 04/23/2015     Priority: Medium     Exocrine pancreatic insufficiency 03/05/2015     Priority: Medium     CMC DJD(carpometacarpal degenerative joint disease), localized primary 02/12/2015     Priority: Medium     CIERRA (generalized anxiety disorder) 08/06/2014     Priority: Medium     Major depressive disorder, recurrent episode, moderate (H) 08/06/2014     Priority: Medium     Abdominal muscle strain 08/05/2014     Priority: Medium     Migraine 07/16/2014     Priority: Medium     Problem list name updated by automated process. Provider to review       Type 1 diabetes mellitus with hypoglycemia and without coma (H) 12/17/2013     Priority: Medium     Problem list name updated by automated process. 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 01/16/2012     Priority: Medium       PERTINENT PAST MEDICAL HISTORY:  Past Medical History:   Diagnosis Date     Chronic abdominal pain      Chronic pancreatitis (H)     S/P pancreatectomy     Depression with anxiety      Gastro-oesophageal reflux disease      Hypothyroidism 4/23/2015     Kidney stones      Low serum cortisol level      Migraines      Other chronic pain     STOMACH     Other chronic pain     LUMBAR  SPINE     Peripheral neuropathy      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    TPIAT     Spasm of sphincter of Oddi        PREVIOUS SURGERIES:  Past Surgical History:   Procedure Laterality Date     ARTHROPLASTY CARPOMETACARPAL (THUMB JOINT)  5/2/2014    Procedure: ARTHROPLASTY CARPOMETACARPAL (THUMB JOINT);  Surgeon: Carina Panda MD;  Location: [facility]     CHOLECYSTECTOMY  2004     COLONOSCOPY  7/18/2014    Procedure: COLONOSCOPY;  Surgeon: Aurora Sahu MD;  [facility]: [facility]     COLONOSCOPY N/A 8/1/2017    Procedure: COLONOSCOPY;  Colonoscopy and upper endoscopy;  Surgeon: Deirdre Harris MD;  [facility]     ENDOSCOPIC RETROGRADE CHOLANGIOPANCREATOGRAM       ENDOSCOPIC RETROGRADE CHOLANGIOPANCREATOGRAM  4/19/2011    Procedure:ENDOSCOPIC RETROGRADE CHOLANGIOPANCREATOGRAM; Pancreatic Stent Placement       ENDOSCOPIC RETROGRADE CHOLANGIOPANCREATOGRAM  5/26/2011    Procedure:ENDOSCOPIC RETROGRADE CHOLANGIOPANCREATOGRAM; with Pancreatic Stent Removal; Surgeon:DALE MIMS; [facility]     ENDOSCOPY UPPER, COLONOSCOPY, COMBINED  4/25/2012    Procedure:COMBINED ENDOSCOPY UPPER, COLONOSCOPY; Enteroscopy with Bile Duct Stent Removal, Colonoscopy  *Latex Safe Room*; Surgeon:GRACY GODWINIQ; [facility]     ESOPHAGOSCOPY, GASTROSCOPY, DUODENOSCOPY (EGD), COMBINED  5/26/2011    Procedure:COMBINED ESOPHAGOSCOPY, GASTROSCOPY, DUODENOSCOPY (EGD); Surgeon:DALE MIMS; [facility]     ESOPHAGOSCOPY, GASTROSCOPY, DUODENOSCOPY (EGD), COMBINED N/A 10/30/2014    Procedure: COMBINED ESOPHAGOSCOPY, GASTROSCOPY, DUODENOSCOPY (EGD), BIOPSY SINGLE OR MULTIPLE;  Surgeon: Sarai Moon MD;  [facility]     ESOPHAGOSCOPY, GASTROSCOPY, DUODENOSCOPY (EGD), COMBINED Left 7/6/2015    Procedure: COMBINED ESOPHAGOSCOPY, GASTROSCOPY, DUODENOSCOPY (EGD), BIOPSY SINGLE OR MULTIPLE;  Surgeon: Thomas Estrada MD;  [facility]     ESOPHAGOSCOPY,  GASTROSCOPY, DUODENOSCOPY (EGD), COMBINED N/A 7/8/2016    Procedure: COMBINED ESOPHAGOSCOPY, GASTROSCOPY, DUODENOSCOPY (EGD), BIOPSY SINGLE OR MULTIPLE;  Surgeon: Eloy Klein MD;  [facility]     ESOPHAGOSCOPY, GASTROSCOPY, DUODENOSCOPY (EGD), COMBINED N/A 8/4/2016    Procedure: COMBINED ESOPHAGOSCOPY, GASTROSCOPY, DUODENOSCOPY (EGD), BIOPSY SINGLE OR MULTIPLE;  Surgeon: Jsaon Brown MD;  [facility]     ESOPHAGOSCOPY, GASTROSCOPY, DUODENOSCOPY (EGD), COMBINED N/A 8/1/2017    Procedure: COMBINED ESOPHAGOSCOPY, GASTROSCOPY, DUODENOSCOPY (EGD);;  Surgeon: Deirdre Harris MD;  [facility]     ESOPHAGOSCOPY, GASTROSCOPY, DUODENOSCOPY (EGD), COMBINED N/A 6/12/2019    Procedure: ESOPHAGOGASTRODUODENOSCOPY (EGD);  Surgeon: Jose Francisco Perdomo MD;  [facility]     GYN SURGERY      Hysterectomy and USO     [facility] ENDOSCOPY W EUS  7/20/2011    Procedure:COMBINED ENDOSCOPIC ULTRASOUND, ESOPHAGOSCOPY, GASTROSCOPY, DUODENOSCOPY (EGD); Surgeon:DARVIN DONOHUE; [facility]     HERNIORRHAPHY VENTRAL N/A 9/15/2016    Procedure: HERNIORRHAPHY VENTRAL;  Surgeon: Juanita Bernabe MD;  [facility]     HYSTERECTOMY  1997 or 1998    USO     INCISION AND DRAINAGE ABDOMEN WASHOUT, COMBINED  8/16/2012    Procedure: COMBINED INCISION AND DRAINAGE ABDOMEN WASHOUT;  ,debridement and Drainage Post Appendectomy;  Surgeon: Ron Austin MD;  [facility]     INJECT TRANSVERSUS ABDOMINIS PLANE (TAP) BLOCK BILATERAL Bilateral 5/26/2016    Procedure: INJECT TRANSVERSUS ABDOMINIS PLANE (TAP) BLOCK BILATERAL;  Surgeon: Leonard Mccallum MD;  [facility]     LAPAROSCOPIC APPENDECTOMY  7/30/2012    Procedure: LAPAROSCOPIC APPENDECTOMY;  Open Appendectomy;  Surgeon: Ron Austin MD;  [facility]     PANCREATECTOMY, TRANSPLANT AUTO ISLET CELL, COMBINED  1/6/2012    Procedure:COMBINED PANCREATECTOMY, TRANSPLANT AUTO ISLET CELL; Total  Pancreatectomy, Auto Islet  Transplant, splenectomy, 18fr. transgastric-jejunal feeding tube placement, liver biopsy; Surgeon:PALAK LEE; [facility]     REPLACE GASTROSTOMY TUBE, PERCUTANEOUS N/A 8/30/2017    Procedure: REPLACE GASTROSTOMY TUBE, PERCUTANEOUS;  GJ Tube Change;  Surgeon: Jose Nath PA-C;  [facility]     SPLENECTOMY         SOCIAL HISTORY:  Social History     Socioeconomic History     Marital status:      Spouse name: Not on file     Number of children: Not on file     Years of education: Not on file     Highest education level: Not on file   Occupational History     Not on file   Tobacco Use     Smoking status: Never     Smokeless tobacco: Never   Vaping Use     Vaping status: Never Used   Substance and Sexual Activity     Alcohol use: No     Alcohol/week: 0.0 standard drinks of alcohol     Drug use: No     Sexual activity: Yes   Other Topics Concern     Parent/sibling w/ CABG, MI or angioplasty before 65F 55M? Not Asked   Social History Narrative     Not on file     Social Determinants of Health     Financial Resource Strain: Not on file   Food Insecurity: Not on file   Transportation Needs: Not on file   Physical Activity: Not on file   Stress: Not on file   Social Connections: Not on file   Intimate Partner Violence: Not on file   Housing Stability: Not on file       FAMILY HISTORY:  Family History   Problem Relation Age of Onset     Hypertension Mother      Diabetes Mother      Osteoporosis Mother      Cancer Father         pancreatic cancer     Diabetes Maternal Grandmother      Cardiovascular Maternal Grandmother      Cancer Maternal Grandfather         lung cancer     Cancer Sister         brain     Cancer Sister         liver cancer       Past/family/social history reviewed and no changes    PHYSICAL EXAMINATION:  Constitutional: aaox3, cooperative, pleasant, not dyspneic/diaphoretic, no acute distress  Vitals reviewed: /67 (BP Location: Left arm, Patient Position: Sitting,  "Cuff Size: Adult Regular)   Pulse 74   Ht 1.575 m (5' 2\")   Wt 59.2 kg (130 lb 8 oz)   SpO2 99%   BMI 23.87 kg/m    Wt:   Wt Readings from Last 2 Encounters:   04/04/23 59.2 kg (130 lb 8 oz)   03/08/23 60.3 kg (133 lb)      Eyes: Sclera anicteric/injected  CV: No edema  Respiratory: Unlabored breathing  Abd: Nondistended, +bs, no hepatosplenomegaly, generalized tenderness throughout, no peritoneal signs  Skin: warm, perfused, no jaundice  Psych: Normal affect  MSK: Normal gait                        Again, thank you for allowing me to participate in the care of your patient.        Sincerely,        Joey Feldman PA-C    "

## 2023-04-04 NOTE — PROGRESS NOTES
NEW PATIENT GI CLINIC VISIT    CC/REFERRING MD:  Amena Maher  REASON FOR CONSULTATION:   Sannarajendra Maher for   Chief Complaint   Patient presents with     New Patient     New consult abdominal pain, acid reflux, nausea and vomiting.       ASSESSMENT/PLAN: Patient here for evaluation of symptoms of chronic nausea, abdominal pain, and concerns about nutritional deficiency and unintentional weight loss.  She reports her symptoms starting in October, but through chart review, she really has had the symptoms more chronically over the past 10 years.  She has had extensive GI work-up for the symptoms.  In trending her weight over time, she reached a zach of 116 pounds last September, but since then, she has improved up to 130 pounds and has been able to maintain that over the past few months.  Her peak weight over the last several years was 153 pounds in 2020.  Her recent laboratory testing did not show any significant nutritional deficiencies.    Reviewing her current symptoms, I think it would be reasonable to pursue gastric emptying scan, given longstanding history of diabetes, to rule out gastroparesis.    The etiology of her abdominal pain remains unclear.  We can pursue a more detailed imaging study of her area of pain to evaluate for any anatomical/structural cause.  Gastroparesis could be playing a role with that pain as well.    She has had limited effectiveness of her antiemetic medications.  I encouraged her to keep trying to eat small meals throughout the day and stick to foods that she tolerates.  She has worked with nutrition in the past on this as well.  I will plan for follow-up with her after I see these imaging studies.  I will also be reaching out to my colleagues in pancreatitis she for further guidance.      RTC 4 weeks    Thank you for this consultation.  It was a pleasure to participate in the care of this patient; please contact us with any further questions.      50 minutes spent on the date  of the encounter doing chart review, patient visit and documentation    This note was created with voice recognition software, and while reviewed for accuracy, typos may remain.     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  Since this procedure, she has had continued issues with chronic abdominal pain, nausea/vomiting, and other nutritional deficiencies.  She has had multiple CTs, MRCPs, abdominal ultrasounds, EGDs, and colonoscopies as part of her work-up.  Due to nausea and weight loss, she has had GJ tube placed in the past, most recently in 1406-6063.    Her most recent upper endoscopy was in 2019 and was notable for pyloric stenosis.  Subsequent upper GI series did not identify a definite stricture.  Her most recent colonoscopy was in 2017 and prep was suboptimal, but no abnormalities were noted and 10-year follow-up was recommended.  CT scan was done in October 2022 that was notable for moderate colonic stool and otherwise no specific findings to suggest cause of abdominal pain.    Currently, she describes daily nausea.  This leads to vomiting 2-3 times daily.  Typically it is bilious vomiting, has not had hematemesis or coffee-ground emesis.  She is trying to eat small meals throughout the day.  She is concerned that she has been losing weight and is worried that she may need tube feeding again.  She did have a number of laboratory tests done recently with endocrinology that did not identify any specific nutritional deficiencies at this time.  She has been prescribed dronabinol, ondansetron, and metoclopramide to treat her nausea, and states that she has used these  medications and they are not working.  She also continues on pantoprazole and feels like her reflux is generally well controlled, but does have some breakthrough symptoms at times.    She has also been experiencing a hernia like sensation in the right mid abdomen.  When she gets episodes of pain, it feels like there is a fist bulging through her abdomen.  She did see a surgeon a couple months ago and there was no concern for any herniation or need for surgical intervention.  Her episodes of pain are somewhat random, not related to dietary intake.    ROS:    10 point ROS neg other than the symptoms noted above in the HPI.    PREVIOUS ENDOSCOPY:  Reviewed, see HPI    PERTINENT RELEVANT IMAGING OR LABS:  Reviewed, see HPI    ALLERGIES:     Allergies   Allergen Reactions     Corticosteroids Other (See Comments)     All oral, IV and injectable steroids are contraindicated (unless in life threatening situations) in Islet Auto transplant recipients. They can cause irreversible loss of islet cell function. Please contact patient's transplant care coordinator YURI Cortez RN at [phone]/pager [phone] and/or endocrinologist prior to administration.       Chocolate Flavor Rash     Breaks out when eats chocolate       PERTINENT MEDICATIONS:    Current Outpatient Medications:      acetaminophen (TYLENOL) 325 MG tablet, Take 3 tablets (975 mg) by mouth every 8 hours, Disp:  , Rfl:      Acetone, Urine, Test (KETONE TEST) STRP, 1 each by In Vitro route as needed (hyperglycemia), Disp: 50 strip, Rfl: 0     alendronate (FOSAMAX) 70 MG tablet, Take 1 tablet (70 mg) by mouth every 7 days On Sundays take first thing in the morning with plain water and remain upright for at least 30 minutes and until after first food of the day  Do not restart Fosamax (alendronate) until your difficulty swallowing has resolved and you have finished the entire course of fluconazole (Diflucan)., Disp: 4 tablet, Rfl: 0     alum & mag  hydroxide-simethicone (MYLANTA/MAALOX) 200-200-25 MG CHEW chewable tablet, Take 1 tablet by mouth 3 times daily as needed for indigestion, Disp: , Rfl:      amylase-lipase-protease (CREON 12) 90453 units CPEP, Take 6 to 8 capsules by mouth with meals and take 4 capsules with snacks. Needs up to 25 capsules per day, Disp: 750 capsule, Rfl: 5     Continuous Blood Gluc  (DEXCOM G6 ) RICARDO, Use to read blood sugars as per 's instructions., Disp: 1 each, Rfl: 0     Continuous Blood Gluc Sensor (DEXCOM G6 SENSOR) MISC, Change every 10 days., Disp: 9 each, Rfl: 3     Continuous Blood Gluc Transmit (DEXCOM G6 TRANSMITTER) MISC, Change every 3 months., Disp: 1 each, Rfl: 3     CONTOUR NEXT TEST test strip, USE TO TEST BLOOD SUGAR 6 TIMES DAILY OR AS DIRECTED, Disp: 600 strip, Rfl: 1     cyclobenzaprine (FLEXERIL) 10 MG tablet, Take 10 mg by mouth 3 times daily as needed for muscle spasms, Disp: , Rfl:      cyclobenzaprine (FLEXERIL) 5 MG tablet, Take 1 tablet (5 mg) by mouth 3 times daily as needed for muscle spasms, Disp: 90 tablet, Rfl: 0     diclofenac (FLECTOR) 1.3 % Patch, Place 1 patch onto the skin 2 times daily, Disp: 30 each, Rfl: 1     diclofenac (VOLTAREN) 1 % GEL, 2 g Apply 2 g to skin four times a day as needed (to affected upper extremity joint(s)). Maximum 8g/day per joint, 16g/day total., Disp: , Rfl:      dicyclomine (BENTYL) 10 MG capsule, Take 10 mg by mouth every 6 hours, Disp: , Rfl:      dronabinol (MARINOL) 2.5 MG capsule, Take 2 capsules (5 mg) by mouth 2 times daily as needed, Disp: 56 capsule, Rfl: 5     DULoxetine (CYMBALTA) 30 MG capsule, Take 1 capsule (30 mg) by mouth daily With 60mg capsule for total dose of 90mg, Disp: 90 capsule, Rfl: 0     DULoxetine (CYMBALTA) 60 MG EC capsule, Take 1 capsule (60 mg) by mouth daily With 30mg capsule for total dose of 90mg, Disp: 90 capsule, Rfl: 1     famotidine (PEPCID) 20 MG tablet, Take 1 tablet (20 mg) by mouth At Bedtime,  Disp: 30 tablet, Rfl: 0     Glucagon (GVOKE HYPOPEN 2-PACK) 1 MG/0.2ML pen, Inject the contents of 1 device under the skin into lower abdomen, outer thigh, or outer upper arm as needed for hypoglycemia. If no response after 15 minutes, additional 1 mg dose from a new device may be injected while waiting for emergency assistance., Disp: 0.4 mL, Rfl: 0     hydrocortisone (CORTAID) 1 % external cream, Apply topically 3 times daily, Disp: 45 g, Rfl: 3     hydrocortisone (CORTEF) 10 MG tablet, Take 10 mg in the morning and 10 mg along with a 5 mg tablet at bedtime for a total dose of 15 mg at bedtime. Watch for hypoglycemia recurrence., Disp: , Rfl:      hydrocortisone (CORTEF) 5 MG tablet, Take 5 mg by mouth At Bedtime along with a 10 mg tablet for a total dose of 15 mg, Disp: , Rfl:      hydrocortisone sodium succinate PF (SOLU-CORTEF) 100 MG injection, Inject 100mg (1 mL) into muscle in emergency or unable to take oral hydrocortisone. Go to emergency room if given. ActoVial NDC [address]-10. Note to pharmacy: Provide two 3ml syringes with 23g 1 inch needles., Disp: 1 mL, Rfl: 0     Injection Device for insulin (NOVOPEN ECHO) RICARDO, Use with   Insulin, Disp: 1 each, Rfl: 0     insulin aspart (NOVOLOG VIAL) 100 UNITS/ML vial, Via insulin pump. Approx 60 units daily., Disp: 60 mL, Rfl: 1     insulin aspart (NOVOLOG VIAL) 100 UNITS/ML vial, USE TO FILL INSULIN PUMP RESERVOIR. NEEDS 150 UNITS EVERY 3 DAYS. CALL [facility] TO SCHEDULE FOLLOW UP APPOINTMENT, Disp: 10 mL, Rfl: 1     insulin aspart (NOVOPEN ECHO) 100 UNIT/ML cartridge, As  instructed per physician, Disp: 5 mL, Rfl: 0     Insulin Disposable Pump (OMNIPOD 5 G6 INTRO, GEN 5,) KIT, 1 each See Admin Instructions Use continuously to infuse insulin., Disp: 1 kit, Rfl: 0     Insulin Disposable Pump (OMNIPOD 5 G6 POD, GEN 5,) MISC, 1 each See Admin Instructions Change pod every 2-3 days., Disp: 35 each, Rfl: 3     levothyroxine (SYNTHROID/LEVOTHROID) 112 MCG tablet, Take 1  tablet (112 mcg) by mouth daily, Disp: 90 tablet, Rfl: 3     linaclotide (LINZESS) 145 MCG capsule, Take 145 mcg by mouth every morning (before breakfast) as needed, Disp: , Rfl:      lipase-protease-amylase (CREON) 45632-[address]-118[address] units CPEP, Take 6 to 8 capsules with every meal, and 4 capsules with snacks. Up to 25 capsules per day, Disp: 750 capsule, Rfl: 5     metoclopramide (REGLAN) 5 MG tablet, Take 2 tablets (10 mg) by mouth 3 times daily, Disp: 180 tablet, Rfl: 3     Nutritional Supplements (BOOST HIGH PROTEIN) LIQD, After above baseline labs are drawn, give: 6 mL/kg to maximum of 360 mL; the beverage is to be consumed within 5 minutes. (Patient taking differently: as needed After above baseline labs are drawn, give: 6 mL/kg to maximum of 360 mL; the beverage is to be consumed within 5 minutes.), Disp: , Rfl: 0     ondansetron (ZOFRAN) 4 MG tablet, Take 1 tablet (4 mg) by mouth every 8 hours as needed for nausea, Disp: 30 tablet, Rfl: 0     ondansetron (ZOFRAN-ODT) 4 MG ODT tab, DISSOLVE ONE TABLET ON THE TONGUE EVERY 6 HOURS AS NEEDED FOR NAUSEA AND VOMITING, Disp: 60 tablet, Rfl: 2     order for DME, by [employer] [address] [place], [place] Ph: [phone] Fax: [phone]  Nutren 1.5 @ 10 ml/hr with advancement by 10 ml/hr q 8 hours to goal rate of 40 ml/hr.  This will provide 1440 kcals (27 kcal/kg/day), 65 g PRO (1.2 g/kg/day), 730 mL H2O, 169 g CHO and no Fiber daily.  , Disp: 1 Month, Rfl: 3     pantoprazole (PROTONIX) 40 MG EC tablet, Take 1 tablet (40 mg) by mouth 2 times daily, Disp: 180 tablet, Rfl: 3     polyethylene glycol (MIRALAX/GLYCOLAX) packet, Take 1 packet by mouth 2 times daily as needed for constipation , Disp: 14 each, Rfl: 5     potassium chloride ER (KLOR-CON M) 20 MEQ CR tablet, Take 1 tablet (20 mEq) by mouth daily, Disp: 90 tablet, Rfl: 1     senna-docusate (SENOKOT-S/PERICOLACE) 8.6-50 MG tablet, Take 1-2 tablets by mouth daily as needed for  constipation, Disp: 60 tablet, Rfl: 3     sodium chloride (OCEAN) 0.65 % nasal spray, Spray 1 spray into both nostrils daily as needed for congestion, Disp: 30 mL, Rfl: 0     sucralfate (CARAFATE) 1 GM tablet, Take 1 tablet (1 g) by mouth 4 times daily (before meals and nightly), Disp: 30 tablet, Rfl: 0     SUMAtriptan (IMITREX) 50 MG tablet, Take 1 tablet (50 mg) by mouth at onset of headache for migraine Take 1 Tab by mouth Once as needed for Migraine Headache. May repeat after two hours.  Maximum dose 200 mg/24 hours., Disp: 30 tablet, Rfl: 1     topiramate (TOPAMAX) 100 MG tablet, Take 1 tablet (100 mg) by mouth 2 times daily, Disp: 180 tablet, Rfl: 1     traMADol (ULTRAM) 50 MG tablet, Take 0.5-1 tablets (25-50 mg) by mouth every 6 hours as needed for moderate pain, Disp: 15 tablet, Rfl: 0     traZODone (DESYREL) 100 MG tablet, TAKE 1-2 TABLETS BY MOUTH AN HOUR BEFORE BEDTIME, Disp: 100 tablet, Rfl: 2    PROBLEM LIST  Patient Active Problem List    Diagnosis Date Noted     Acute cystitis with hematuria 09/26/2022     Priority: Medium     Gastroesophageal reflux disease without esophagitis 09/26/2022     Priority: Medium     RUQ abdominal pain 09/26/2022     Priority: Medium     Weight loss 09/26/2022     Priority: Medium     Age-related osteoporosis without current pathological fracture 11/12/2020     Priority: Medium     Gastrointestinal hemorrhage, unspecified gastrointestinal hemorrhage type 11/08/2020     Priority: Medium     Chondromalacia of left patella 07/06/2018     Priority: Medium     Nausea 11/17/2016     Priority: Medium     Adrenal insufficiency (H) 09/15/2016     Priority: Medium     S/P hernia repair 09/15/2016     Priority: Medium     Anemia, iron deficiency 08/31/2016     Priority: Medium     Iron deficiency 08/09/2016     Priority: Medium     Odynophagia 08/02/2016     Priority: Medium     Decreased oral intake 07/06/2016     Priority: Medium     Mood disorder due to a general medical  condition 03/29/2016     Priority: Medium     Hypoglycemia 03/15/2016     Priority: Medium     Hypothyroidism 04/23/2015     Priority: Medium     Exocrine pancreatic insufficiency 03/05/2015     Priority: Medium     CMC DJD(carpometacarpal degenerative joint disease), localized primary 02/12/2015     Priority: Medium     CIERRA (generalized anxiety disorder) 08/06/2014     Priority: Medium     Major depressive disorder, recurrent episode, moderate (H) 08/06/2014     Priority: Medium     Abdominal muscle strain 08/05/2014     Priority: Medium     Migraine 07/16/2014     Priority: Medium     Problem list name updated by automated process. Provider to review       Type 1 diabetes mellitus with hypoglycemia and without coma (H) 12/17/2013     Priority: Medium     Problem list name updated by automated process. 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 01/16/2012     Priority: Medium       PERTINENT PAST MEDICAL HISTORY:  Past Medical History:   Diagnosis Date     Chronic abdominal pain      Chronic pancreatitis (H)     S/P pancreatectomy     Depression with anxiety      Gastro-oesophageal reflux disease      Hypothyroidism 4/23/2015     Kidney stones      Low serum cortisol level      Migraines      Other chronic pain     STOMACH     Other chronic pain     LUMBAR SPINE     Peripheral neuropathy      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    TPIAT     Spasm of sphincter of Oddi        PREVIOUS SURGERIES:  Past Surgical History:   Procedure Laterality Date     ARTHROPLASTY  CARPOMETACARPAL (THUMB JOINT)  5/2/2014    Procedure: ARTHROPLASTY CARPOMETACARPAL (THUMB JOINT);  Surgeon: Carina Panda MD;  Location: [facility]     CHOLECYSTECTOMY  2004     COLONOSCOPY  7/18/2014    Procedure: COLONOSCOPY;  Surgeon: Aurora Sahu MD;  [facility]: [facility]     COLONOSCOPY N/A 8/1/2017    Procedure: COLONOSCOPY;  Colonoscopy and upper endoscopy;  Surgeon: Deirdre Harris MD;  [facility]: [facility]     ENDOSCOPIC RETROGRADE CHOLANGIOPANCREATOGRAM       ENDOSCOPIC RETROGRADE CHOLANGIOPANCREATOGRAM  4/19/2011    Procedure:ENDOSCOPIC RETROGRADE CHOLANGIOPANCREATOGRAM; Pancreatic Stent Placement       ENDOSCOPIC RETROGRADE CHOLANGIOPANCREATOGRAM  5/26/2011    Procedure:ENDOSCOPIC RETROGRADE CHOLANGIOPANCREATOGRAM; with Pancreatic Stent Removal; Surgeon:DALE MIMS; [facility]     ENDOSCOPY UPPER, COLONOSCOPY, COMBINED  4/25/2012    Procedure:COMBINED ENDOSCOPY UPPER, COLONOSCOPY; Enteroscopy with Bile Duct Stent Removal, Colonoscopy  *Latex Safe Room*; Surgeon:GRACY GODWINIQ; [facility]     ESOPHAGOSCOPY, GASTROSCOPY, DUODENOSCOPY (EGD), COMBINED  5/26/2011    Procedure:COMBINED ESOPHAGOSCOPY, GASTROSCOPY, DUODENOSCOPY (EGD); Surgeon:DALE MIMS; [facility]     ESOPHAGOSCOPY, GASTROSCOPY, DUODENOSCOPY (EGD), COMBINED N/A 10/30/2014    Procedure: COMBINED ESOPHAGOSCOPY, GASTROSCOPY, DUODENOSCOPY (EGD), BIOPSY SINGLE OR MULTIPLE;  Surgeon: Sarai Moon MD;  [facility]     ESOPHAGOSCOPY, GASTROSCOPY, DUODENOSCOPY (EGD), COMBINED Left 7/6/2015    Procedure: COMBINED ESOPHAGOSCOPY, GASTROSCOPY, DUODENOSCOPY (EGD), BIOPSY SINGLE OR MULTIPLE;  Surgeon: Thomas Estrada MD;  [facility]     ESOPHAGOSCOPY, GASTROSCOPY, DUODENOSCOPY (EGD), COMBINED N/A 7/8/2016    Procedure: COMBINED ESOPHAGOSCOPY, GASTROSCOPY, DUODENOSCOPY (EGD), BIOPSY SINGLE OR MULTIPLE;  Surgeon: Eloy Klein MD;  [facility]      ESOPHAGOSCOPY, GASTROSCOPY, DUODENOSCOPY (EGD), COMBINED N/A 8/4/2016    Procedure: COMBINED ESOPHAGOSCOPY, GASTROSCOPY, DUODENOSCOPY (EGD), BIOPSY SINGLE OR MULTIPLE;  Surgeon: Jason Brown MD;  [facility]     ESOPHAGOSCOPY, GASTROSCOPY, DUODENOSCOPY (EGD), COMBINED N/A 8/1/2017    Procedure: COMBINED ESOPHAGOSCOPY, GASTROSCOPY, DUODENOSCOPY (EGD);;  Surgeon: Deidrre Harris MD;  [facility]     ESOPHAGOSCOPY, GASTROSCOPY, DUODENOSCOPY (EGD), COMBINED N/A 6/12/2019    Procedure: ESOPHAGOGASTRODUODENOSCOPY (EGD);  Surgeon: Jose Francisco Perdomo MD;  [facility]     GYN SURGERY      Hysterectomy and USO     [facility] ENDOSCOPY W EUS  7/20/2011    Procedure:COMBINED ENDOSCOPIC ULTRASOUND, ESOPHAGOSCOPY, GASTROSCOPY, DUODENOSCOPY (EGD); Surgeon:DARVIN DONOHUE; [facility]     HERNIORRHAPHY VENTRAL N/A 9/15/2016    Procedure: HERNIORRHAPHY VENTRAL;  Surgeon: Juanita Bernabe MD;  [facility]     HYSTERECTOMY  1997 or 1998    USO     INCISION AND DRAINAGE ABDOMEN WASHOUT, COMBINED  8/16/2012    Procedure: COMBINED INCISION AND DRAINAGE ABDOMEN WASHOUT;  ,debridement and Drainage Post Appendectomy;  Surgeon: Ron Austin MD;  [facility]     INJECT TRANSVERSUS ABDOMINIS PLANE (TAP) BLOCK BILATERAL Bilateral 5/26/2016    Procedure: INJECT TRANSVERSUS ABDOMINIS PLANE (TAP) BLOCK BILATERAL;  Surgeon: Leonard Mccallum MD;  [facility]     LAPAROSCOPIC APPENDECTOMY  7/30/2012    Procedure: LAPAROSCOPIC APPENDECTOMY;  Open Appendectomy;  Surgeon: Ron Austin MD;  [facility]     PANCREATECTOMY, TRANSPLANT AUTO ISLET CELL, COMBINED  1/6/2012    Procedure:COMBINED PANCREATECTOMY, TRANSPLANT AUTO ISLET CELL; Total  Pancreatectomy, Auto Islet Transplant, splenectomy, 18fr. transgastric-jejunal feeding tube placement, liver biopsy; Surgeon:APLAK LEE; [facility]     REPLACE GASTROSTOMY TUBE, PERCUTANEOUS N/A 8/30/2017    Procedure: REPLACE  "GASTROSTOMY TUBE, PERCUTANEOUS;  GJ Tube Change;  Surgeon: Jose Nath PA-C;  [facility]     SPLENECTOMY         SOCIAL HISTORY:  Social History     Socioeconomic History     Marital status:      Spouse name: Not on file     Number of children: Not on file     Years of education: Not on file     Highest education level: Not on file   Occupational History     Not on file   Tobacco Use     Smoking status: Never     Smokeless tobacco: Never   Vaping Use     Vaping status: Never Used   Substance and Sexual Activity     Alcohol use: No     Alcohol/week: 0.0 standard drinks of alcohol     Drug use: No     Sexual activity: Yes   Other Topics Concern     Parent/sibling w/ CABG, MI or angioplasty before 65F 55M? Not Asked   Social History Narrative     Not on file     Social Determinants of Health     Financial Resource Strain: Not on file   Food Insecurity: Not on file   Transportation Needs: Not on file   Physical Activity: Not on file   Stress: Not on file   Social Connections: Not on file   Intimate Partner Violence: Not on file   Housing Stability: Not on file       FAMILY HISTORY:  Family History   Problem Relation Age of Onset     Hypertension Mother      Diabetes Mother      Osteoporosis Mother      Cancer Father         pancreatic cancer     Diabetes Maternal Grandmother      Cardiovascular Maternal Grandmother      Cancer Maternal Grandfather         lung cancer     Cancer Sister         brain     Cancer Sister         liver cancer       Past/family/social history reviewed and no changes    PHYSICAL EXAMINATION:  Constitutional: aaox3, cooperative, pleasant, not dyspneic/diaphoretic, no acute distress  Vitals reviewed: /67 (BP Location: Left arm, Patient Position: Sitting, Cuff Size: Adult Regular)   Pulse 74   Ht 1.575 m (5' 2\")   Wt 59.2 kg (130 lb 8 oz)   SpO2 99%   BMI 23.87 kg/m    Wt:   Wt Readings from Last 2 Encounters:   04/04/23 59.2 kg (130 lb 8 oz)   03/08/23 60.3 " kg (133 lb)      Eyes: Sclera anicteric/injected  CV: No edema  Respiratory: Unlabored breathing  Abd: Nondistended, +bs, no hepatosplenomegaly, generalized tenderness throughout, no peritoneal signs  Skin: warm, perfused, no jaundice  Psych: Normal affect  MSK: Normal gait

## 2023-04-21 NOTE — TELEPHONE ENCOUNTER
Called and left  for Pt that before any gel injections could be started they would need to have completed physical therapy. Once they have completed PT we can go forward with the Gel injections.     Ash HENDRICKSON ATC

## 2023-05-09 ENCOUNTER — TELEPHONE (OUTPATIENT)
Dept: GASTROENTEROLOGY | Facility: CLINIC | Age: 58
End: 2023-05-09

## 2023-05-09 NOTE — TELEPHONE ENCOUNTER
Good Afternoon,    This is a peer to peer request for MR Abdomen MRCP w/o & w Contrast procedure. The insurance is requesting this to better understand the reason why the test is being ordered. This peer to peer needs to be completed on or before 5/12/2023.     Please call the insurance company and reference the following information:    Payor phone number: [phone], option 1    Case number: [id number]    Patient ID:[id number]    Reason why it was denied: not medically necessary          Requesting response of outcome back to FC's on approval/denial with the following information:   o auth#  o date range  o who they spoke to     If you have any further questions please feel free to reach out to me. Thank you for your attention on this.     Selma Leahy    Financial Counselor  [address] [address] [place] [address]  Phone- [phone]  Fax- [phone]

## 2023-05-18 NOTE — TELEPHONE ENCOUNTER
Joey Feldman PA-C Lohela, Christa 9 days ago     Oh okay, thank you for clarifying. I'll speak with patient and let her know that I would like her to complete the gastric emptying scan first.     Thank you,   JUNO Snell, Joey Silvestre PA-C 9 days ago     When I spoke with the insurance company, it was the gastric emptying scan and they need the records for that in order to proceed. By the looks of the patients chart she was a no show to that scan. I did speak with the patient prior to sending the peer to peer message and she was going to reschedule the MR Abdomen MRCP w/o & w Contrast until we can get it approved. I didn't realize until now she didn't complete the first scan     Phone call attempt. Left message for patient alerting her to [employer] message re: getting gastric emptying scan done prior to MRI.     Minerva Craig RN

## 2023-06-02 ENCOUNTER — HEALTH MAINTENANCE LETTER (OUTPATIENT)
Age: 58
End: 2023-06-02

## 2023-06-18 NOTE — TELEPHONE ENCOUNTER
Once Chantell has picked up her ketone strips she will test a baseline urine and then 2-3 times a day .   Bed/Stretcher in lowest position, wheels locked, appropriate side rails in place/Call bell, personal items and telephone in reach/Instruct patient to call for assistance before getting out of bed/chair/stretcher/Non-slip footwear applied when patient is off stretcher/[place] to call system/Physically safe environment - no spills, clutter or unnecessary equipment/Purposeful proactive rounding/Room/bathroom lighting operational, light cord in reach

## 2023-06-29 NOTE — TELEPHONE ENCOUNTER
[email or web address] message sent to patient with number to reschedule imaging that has been ordered.   Informed patient that per procedure finance team, her insurance would like the gastric emptying scan completed before proceeding with MR abdomen.     Minerva Craig RN

## 2023-07-12 DIAGNOSIS — Z94.9 CELL TRANSPLANT: ICD-10-CM

## 2023-07-12 RX ORDER — INSULIN ASPART 100 [IU]/ML
INJECTION, SOLUTION INTRAVENOUS; SUBCUTANEOUS
Qty: 60 ML | Refills: 1 | Status: SHIPPED | OUTPATIENT
Start: 2023-07-12 | End: 2023-10-26

## 2023-07-12 NOTE — TELEPHONE ENCOUNTER
NOVOLOG 100UNIT/ML SOLN   Last Written Prescription Date:   10/24/2022  Last Fill Quantity: 60,   # refills: 1  Last Office Visit : 10/12/2022  Future Office visit:  None    Routing refill request to provider for review/approval because:  Drug not on the [facility], P or [facility] refill protocol or controlled substance      Kalli Ge RN  Central Triage Red Flags/Med Refills

## 2023-07-18 ENCOUNTER — TELEPHONE (OUTPATIENT)
Dept: ENDOCRINOLOGY | Facility: CLINIC | Age: 58
End: 2023-07-18

## 2023-07-19 NOTE — TELEPHONE ENCOUNTER
Signed insulin verification form faxed to [facility] . Copy on file.   Emili Mchugh RN on 7/19/2023 at 12:10 PM

## 2023-09-08 DIAGNOSIS — E89.1 POST-PANCREATECTOMY DIABETES (H): ICD-10-CM

## 2023-09-08 DIAGNOSIS — E13.9 POST-PANCREATECTOMY DIABETES (H): ICD-10-CM

## 2023-09-08 DIAGNOSIS — Z90.410 POST-PANCREATECTOMY DIABETES (H): ICD-10-CM

## 2023-09-12 ENCOUNTER — TELEPHONE (OUTPATIENT)
Dept: ENDOCRINOLOGY | Facility: CLINIC | Age: 58
End: 2023-09-12

## 2023-09-12 NOTE — TELEPHONE ENCOUNTER
PA Initiation    Medication: OMNIPOD 5 G6 POD (GEN 5) MISC  Insurance Company: Other (see comments)Comment:  [facility]  Pharmacy Filling the Rx: [facility] - [place], [place] - [facility] [address]  Filling Pharmacy Phone: [phone]  Filling Pharmacy Fax: [phone]  Start Date: 9/12/2023           Thank you,     Trav Dhillon [facility]  Pharmacy Clinic [facility]  [email or web address]   Phone: [phone]  Fax: [phone]

## 2023-09-12 NOTE — TELEPHONE ENCOUNTER
PRIOR AUTHORIZATION DENIED    Medication: OMNIPOD 5 G6 POD (GEN 5) MISC  Insurance Company: Other (see comments)Comment:  [employer]  Denial Date: 9/12/2023  Denial Rational: Plan Exclusion. Not covered under pharmacy benefit.   Appeal Information: N/A  Patient Notified:   Yes - via [employer]. Offered [facility] to see if they can bill it medically for her.          Thank you,     Trav Dhillon [facility]  Pharmacy Clinic [facility]  [email or web address]   Phone: [phone]  Fax: [phone]

## 2023-09-12 NOTE — TELEPHONE ENCOUNTER
Test strips    600 strip 1 3/24/2022     10/12/2022  [facility] Jeanne English MD  Endocrinology, Diabetes, and Metabolism    Nv:none    RTC in 4-6 weeks     Scheduling has been notified to contact the pt for appointment.

## 2023-09-18 ENCOUNTER — TELEPHONE (OUTPATIENT)
Dept: EDUCATION SERVICES | Facility: CLINIC | Age: 58
End: 2023-09-18

## 2023-09-18 NOTE — TELEPHONE ENCOUNTER
Patient left voice mail, she received a new controller for her Omnipod 5 and needs the settings.  Call to patient, she states she has been taking injections of Novolog all weekend. She returned her controller before she received the new one.     Reminded patient she can always call the Omnipod helpline 24/7.  [employer] sent with patient pump settings.      Jennifer RAMIREZN, RN, PHN, Formerly named [facility]

## 2023-09-19 DIAGNOSIS — E10.649 TYPE 1 DIABETES MELLITUS WITH HYPOGLYCEMIA AND WITHOUT COMA (H): ICD-10-CM

## 2023-09-19 DIAGNOSIS — K86.81 EXOCRINE PANCREATIC INSUFFICIENCY: ICD-10-CM

## 2023-09-19 DIAGNOSIS — E89.1 POST-PANCREATECTOMY DIABETES (H): Primary | ICD-10-CM

## 2023-09-19 DIAGNOSIS — E13.9 POST-PANCREATECTOMY DIABETES (H): Primary | ICD-10-CM

## 2023-09-19 DIAGNOSIS — Z90.410 POST-PANCREATECTOMY DIABETES (H): Primary | ICD-10-CM

## 2023-09-20 RX ORDER — INSULIN ASPART 100 [IU]/ML
INJECTION, SOLUTION INTRAVENOUS; SUBCUTANEOUS
Qty: 15 ML | Refills: 0 | OUTPATIENT
Start: 2023-09-20

## 2023-09-23 ENCOUNTER — HEALTH MAINTENANCE LETTER (OUTPATIENT)
Age: 58
End: 2023-09-23

## 2023-10-26 DIAGNOSIS — Z90.410 POST-PANCREATECTOMY DIABETES (H): ICD-10-CM

## 2023-10-26 DIAGNOSIS — E13.9 POST-PANCREATECTOMY DIABETES (H): ICD-10-CM

## 2023-10-26 DIAGNOSIS — E89.1 POST-PANCREATECTOMY DIABETES (H): ICD-10-CM

## 2023-10-26 DIAGNOSIS — Z94.9 CELL TRANSPLANT: ICD-10-CM

## 2023-10-26 RX ORDER — INSULIN ASPART 100 [IU]/ML
INJECTION, SOLUTION INTRAVENOUS; SUBCUTANEOUS
Qty: 20 ML | Refills: 0 | Status: SHIPPED | OUTPATIENT
Start: 2023-10-26

## 2023-10-26 RX ORDER — INSULIN PMP CART,AUT,G6/7,CNTR
EACH SUBCUTANEOUS
Qty: 125 EACH | Refills: 0 | Status: SHIPPED | OUTPATIENT
Start: 2023-10-26

## 2023-10-26 NOTE — TELEPHONE ENCOUNTER
Short Acting Insulin Protocol Failed 10/26/2023 10:11 AM   Protocol Details  HgbA1C in past 3 or 6 months    Recent (6 mo) or future (30 days) visit within the authorizing provider's specialty

## 2023-10-26 NOTE — TELEPHONE ENCOUNTER
NOVOLOG VIAL 100 UNIT/ML soln     60 mL 1 7/12/2023     10/12/2022  [facility] [place]      Jeanne Macias MD  Endocrinology, Diabetes, and Metabolism    Routed because:  endo triage to fill

## 2023-12-02 ENCOUNTER — HEALTH MAINTENANCE LETTER (OUTPATIENT)
Age: 58
End: 2023-12-02

## 2024-01-09 RX ORDER — FUROSEMIDE 20 MG
20 TABLET ORAL
OUTPATIENT
Start: 2024-01-09

## 2024-01-09 NOTE — TELEPHONE ENCOUNTER
[facility] Call Center    Phone Message    May a detailed message be left on voicemail: yes     Reason for Call: Medication Refill Request    Has the patient contacted the pharmacy for the refill? Yes   Name of medication being requested: Lasik  Provider who prescribed the medication: PCP  Pharmacy: [facility] [address] [place] [id number] phone number [phone]  Date medication is needed: ASAP  Patient is [place] of [place] and needs a refill on this prescription. She is completely out of the medication. She did not have a fax number for [employer]. Please send that over and call patient to advise.

## 2024-01-09 NOTE — TELEPHONE ENCOUNTER
furosemide (LASIX) 20 MG tablet       Take 1 tablet (20 mg) by mouth 2 times daily   Last Written Prescription Date:  3/5/2019  Last Fill Quantity: 180,   # refills: 1  Last Office Visit : 2/7/23 Hung Ardonlucas 11/4/22  Future Office visit:  None    Routing refill request to provider for review/approval because:  Last  ordered Furosemide on 3/5/2019, end date 6/14/2019  Not active on med list  Last CMP 3/2/23> abn Na of 135,       Last Comprehensive Metabolic Panel:  Sodium   Date Value Ref Range Status   03/02/2023 135 (L) 136 - 145 mmol/L Final   11/12/2020 141 133 - 144 mmol/L Final     Potassium   Date Value Ref Range Status   03/02/2023 4.4 3.4 - 5.3 mmol/L Final   11/12/2020 3.7 3.4 - 5.3 mmol/L Final     Chloride   Date Value Ref Range Status   03/02/2023 100 98 - 107 mmol/L Final   11/12/2020 110 (H) 94 - 109 mmol/L Final     Carbon Dioxide   Date Value Ref Range Status   11/12/2020 27 20 - 32 mmol/L Final     Carbon Dioxide (CO2)   Date Value Ref Range Status   03/02/2023 23 22 - 29 mmol/L Final     Anion Gap   Date Value Ref Range Status   03/02/2023 12 7 - 15 mmol/L Final   11/12/2020 4 3 - 14 mmol/L Final     Glucose   Date Value Ref Range Status   03/02/2023 387 (H) 70 - 99 mg/dL Final   11/12/2020 129 (H) 70 - 99 mg/dL Final     Urea Nitrogen   Date Value Ref Range Status   03/02/2023 7.6 (L) 8.0 - 23.0 mg/dL Final   11/12/2020 8 7 - 30 mg/dL Final     Creatinine   Date Value Ref Range Status   03/02/2023 0.80 0.51 - 0.95 mg/dL Final   11/12/2020 0.83 0.52 - 1.04 mg/dL Final     GFR Estimate   Date Value Ref Range Status   03/02/2023 84 >60 mL/min/1.73m2 Final     Comment:     eGFR calculated using 2021 CKD-EPI equation.   11/12/2020 78 >60 mL/min/[1.73_m2] Final     Comment:     Non  GFR Calc  Starting 12/18/2018, serum creatinine based estimated GFR (eGFR) will be   calculated using the Chronic Kidney Disease Epidemiology Collaboration   (CKD-EPI) equation.       Calcium   Date  Value Ref Range Status   03/02/2023 9.2 8.6 - 10.0 mg/dL Final   11/12/2020 8.2 (L) 8.5 - 10.1 mg/dL Final     Bilirubin Total   Date Value Ref Range Status   03/02/2023 0.3 <=1.2 mg/dL Final   11/09/2020 0.4 0.2 - 1.3 mg/dL Final     Alkaline Phosphatase   Date Value Ref Range Status   03/02/2023 106 (H) 35 - 104 U/L Final   11/09/2020 88 40 - 150 U/L Final     ALT   Date Value Ref Range Status   03/02/2023 43 (H) 10 - 35 U/L Final   11/09/2020 50 0 - 50 U/L Final     AST   Date Value Ref Range Status   03/02/2023 67 (H) 10 - 35 U/L Final   11/09/2020 34 0 - 45 U/L Final                Lasik  Provider who prescribed the medication: PCP  Pharmacy: [facility] [address] [place] [id number] phone number [phone]  Date medication is needed: ASAP  Patient is out of state and needs a refill on this prescription. She is completely out of the medication. She did not have a fax number for [employer]. Please send that over and call patient to advise.

## 2024-02-07 NOTE — TELEPHONE ENCOUNTER
Patient requesting a call back for following up on below medication message. Please call [phone] to discuss this further with her . Thank you

## 2024-04-08 ENCOUNTER — TELEPHONE (OUTPATIENT)
Dept: TRANSPLANT | Facility: CLINIC | Age: 59
End: 2024-04-08

## 2024-04-09 NOTE — TELEPHONE ENCOUNTER
Chantell reached out to request diabetes meds and supplies, specifically Omnipod and CGM.    She is in [place] and reports being admitted for hyperglycemia a couple of times.   Advised that I cannot prescribe since she has not seen me in >1 year and that insurance would not aki a prior authorization for pump/CGM if we sent these regardless.  Apparently she is in [place] for a prolonged period of time due to mother-in-law illness.  Asked her to establish with a PCP there (per Chantell, they will not see her).      Asked her to let me know the earliest she will be back in [place] and I am willing to work her in.  Advised that seeing virtual is not likely an option for this purpose due to medical licensing constraints.

## 2024-04-20 ENCOUNTER — HEALTH MAINTENANCE LETTER (OUTPATIENT)
Age: 59
End: 2024-04-20

## 2024-06-29 ENCOUNTER — HEALTH MAINTENANCE LETTER (OUTPATIENT)
Age: 59
End: 2024-06-29

## 2024-07-18 ENCOUNTER — TELEPHONE (OUTPATIENT)
Dept: TRANSPLANT | Facility: CLINIC | Age: 59
End: 2024-07-18

## 2024-08-12 ENCOUNTER — TRANSFERRED RECORDS (OUTPATIENT)
Dept: MULTI SPECIALTY CLINIC | Facility: CLINIC | Age: 59
End: 2024-08-12

## 2024-08-12 LAB — RETINOPATHY: NORMAL

## 2024-11-07 NOTE — LETTER
5/2/2022         RE: Chantell Kidd  [address] [facility] [address]        Dear Colleague,    Thank you for referring your patient, Chantell Kidd, to the [facility] DIABETES EDUCATION [place]. Please see a copy of my visit note below.    Diabetes Education     No referral in place, and patient thought the appointment was cancelled.   Discussed a few general topics briefly.  Chantell is interested in a different pump.  Discussed the difference between Tandem and Omnipod.  Due to history of hypoglycemia would recommend a pump with automated dosing for the suspend features.  Is seeing Dr. Maher in one month and recommend looking at these two systems in detail.  Would recommend patient establish with a local educator and patient agrees with this plan and is in favor of. Notes blood sugars have been running higher.  Asked about mealtime boluses and Chantell notes being very good about this due to blood sugars running higher.   Recommend uploading pump incase there could be setting changes made before the appointment next month and establishing with  endocrinology.   Chantell will work on uploading to [employer] and updating.   Note sent to Dr. Maher about adding a new Diabetes ed referral.         Mitali Rodriguez RD, LD, CDCES  Diabetes Education          
Dr. Ervin/Name of MD Notified: (Please Specify)

## 2024-11-16 ENCOUNTER — HEALTH MAINTENANCE LETTER (OUTPATIENT)
Age: 59
End: 2024-11-16

## 2025-01-23 ENCOUNTER — ENROLLMENT (OUTPATIENT)
Dept: HOME HEALTH SERVICES | Facility: HOME HEALTH | Age: 60
End: 2025-01-23
Payer: COMMERCIAL

## 2025-01-23 ENCOUNTER — LAB (OUTPATIENT)
Dept: LAB | Facility: CLINIC | Age: 60
End: 2025-01-23
Payer: COMMERCIAL

## 2025-01-23 DIAGNOSIS — R97.0 CARCINOEMBRYONIC ANTIGEN (CEA) ELEVATION: ICD-10-CM

## 2025-01-23 DIAGNOSIS — E03.9 HYPOTHYROIDISM: ICD-10-CM

## 2025-01-23 DIAGNOSIS — Z71.1: ICD-10-CM

## 2025-01-23 DIAGNOSIS — E10.649 TYPE 1 DIABETES MELLITUS WITH HYPOGLYCEMIA AND WITHOUT COMA (H): ICD-10-CM

## 2025-01-23 LAB
ALBUMIN SERPL BCG-MCNC: 4.3 G/DL (ref 3.5–5.2)
ALP SERPL-CCNC: 211 U/L (ref 40–150)
ALT SERPL W P-5'-P-CCNC: 244 U/L (ref 0–50)
ANION GAP SERPL CALCULATED.3IONS-SCNC: 14 MMOL/L (ref 7–15)
AST SERPL W P-5'-P-CCNC: 360 U/L (ref 0–45)
BASOPHILS # BLD AUTO: 0 10E3/UL (ref 0–0.2)
BASOPHILS NFR BLD AUTO: 1 %
BILIRUB SERPL-MCNC: 0.4 MG/DL
BUN SERPL-MCNC: 4.7 MG/DL (ref 8–23)
CALCIUM SERPL-MCNC: 9.1 MG/DL (ref 8.8–10.4)
CEA SERPL-MCNC: 17.2 NG/ML
CHLORIDE SERPL-SCNC: 90 MMOL/L (ref 98–107)
CREAT SERPL-MCNC: 0.35 MG/DL (ref 0.51–0.95)
EGFRCR SERPLBLD CKD-EPI 2021: >90 ML/MIN/1.73M2
EOSINOPHIL # BLD AUTO: 0 10E3/UL (ref 0–0.7)
EOSINOPHIL NFR BLD AUTO: 0 %
ERYTHROCYTE [DISTWIDTH] IN BLOOD BY AUTOMATED COUNT: 10.9 % (ref 10–15)
EST. AVERAGE GLUCOSE BLD GHB EST-MCNC: 444 MG/DL
GLUCOSE SERPL-MCNC: 451 MG/DL (ref 70–99)
HBA1C MFR BLD: 17.1 %
HCO3 SERPL-SCNC: 27 MMOL/L (ref 22–29)
HCT VFR BLD AUTO: 37.4 % (ref 35–47)
HGB BLD-MCNC: 13.6 G/DL (ref 11.7–15.7)
IMM GRANULOCYTES # BLD: 0 10E3/UL
IMM GRANULOCYTES NFR BLD: 0 %
LYMPHOCYTES # BLD AUTO: 1.3 10E3/UL (ref 0.8–5.3)
LYMPHOCYTES NFR BLD AUTO: 19 %
MCH RBC QN AUTO: 31.3 PG (ref 26.5–33)
MCHC RBC AUTO-ENTMCNC: 36.4 G/DL (ref 31.5–36.5)
MCV RBC AUTO: 86 FL (ref 78–100)
MONOCYTES # BLD AUTO: 0.4 10E3/UL (ref 0–1.3)
MONOCYTES NFR BLD AUTO: 7 %
NEUTROPHILS # BLD AUTO: 4.8 10E3/UL (ref 1.6–8.3)
NEUTROPHILS NFR BLD AUTO: 73 %
NRBC # BLD AUTO: 0 10E3/UL
NRBC BLD AUTO-RTO: 0 /100
PHOSPHATE SERPL-MCNC: 3.1 MG/DL (ref 2.5–4.5)
PLATELET # BLD AUTO: 286 10E3/UL (ref 150–450)
POTASSIUM SERPL-SCNC: 3.4 MMOL/L (ref 3.4–5.3)
PREALB SERPL-MCNC: 13.3 MG/DL (ref 20–40)
PROT SERPL-MCNC: 6.8 G/DL (ref 6.4–8.3)
RBC # BLD AUTO: 4.35 10E6/UL (ref 3.8–5.2)
SODIUM SERPL-SCNC: 131 MMOL/L (ref 135–145)
T4 FREE SERPL-MCNC: 1.1 NG/DL (ref 0.9–1.7)
TSH SERPL DL<=0.005 MIU/L-ACNC: 4.49 UIU/ML (ref 0.3–4.2)
WBC # BLD AUTO: 6.6 10E3/UL (ref 4–11)

## 2025-01-23 PROCEDURE — 36415 COLL VENOUS BLD VENIPUNCTURE: CPT | Performed by: PATHOLOGY

## 2025-01-23 PROCEDURE — 84100 ASSAY OF PHOSPHORUS: CPT | Performed by: PATHOLOGY

## 2025-01-23 PROCEDURE — 99000 SPECIMEN HANDLING OFFICE-LAB: CPT | Performed by: PATHOLOGY

## 2025-01-23 PROCEDURE — 84439 ASSAY OF FREE THYROXINE: CPT | Performed by: PATHOLOGY

## 2025-01-23 PROCEDURE — 83036 HEMOGLOBIN GLYCOSYLATED A1C: CPT

## 2025-01-23 PROCEDURE — 84134 ASSAY OF PREALBUMIN: CPT

## 2025-01-23 PROCEDURE — 84443 ASSAY THYROID STIM HORMONE: CPT | Performed by: PATHOLOGY

## 2025-01-23 PROCEDURE — 80053 COMPREHEN METABOLIC PANEL: CPT | Performed by: PATHOLOGY

## 2025-01-23 PROCEDURE — 85025 COMPLETE CBC W/AUTO DIFF WBC: CPT | Performed by: PATHOLOGY

## 2025-01-23 PROCEDURE — 82378 CARCINOEMBRYONIC ANTIGEN: CPT

## 2025-01-24 ENCOUNTER — TELEPHONE (OUTPATIENT)
Dept: INTERNAL MEDICINE | Facility: CLINIC | Age: 60
End: 2025-01-24

## 2025-01-24 DIAGNOSIS — E10.649 TYPE 1 DIABETES MELLITUS WITH HYPOGLYCEMIA AND WITHOUT COMA (H): Primary | ICD-10-CM

## 2025-01-25 NOTE — CONFIDENTIAL NOTE
Any RN please call this patient of Dr. Morgan:  -- results are safe for now  -- a1c is very high, and possibly accounts for at least some of her symptoms in [place].  -- please get that CT scheduled. If needed, forward to appt coordinators.    Also, please reconcile medicines. What exactly is she taking for diabetes?  Has she been taking it regularly?  At that a1c, our records can't be accurate.    We have an endocrine referral in, but she will need some intensified care before that, and before Dr. Morgan's appt in February.

## 2025-01-27 ENCOUNTER — TELEPHONE (OUTPATIENT)
Dept: INTERNAL MEDICINE | Facility: CLINIC | Age: 60
End: 2025-01-27
Payer: COMMERCIAL

## 2025-01-27 NOTE — TELEPHONE ENCOUNTER
Spoke with the patient to schedule a CT ASAP and a follow up appointment. Offered an appt 1/28 with Dr. Gore but pt declined. Only wants to see PCP or previous provider, advised Dr. Knight doesn't have avail until 2/6. Spoke with an imaging scheduler to schedule STAT CT, and will call pt back to schedule a follow up appt once I verify appropriate timeframe

## 2025-01-28 ENCOUNTER — ANCILLARY PROCEDURE (OUTPATIENT)
Dept: MAMMOGRAPHY | Facility: CLINIC | Age: 60
End: 2025-01-28
Payer: COMMERCIAL

## 2025-01-28 ENCOUNTER — ANCILLARY PROCEDURE (OUTPATIENT)
Dept: CT IMAGING | Facility: CLINIC | Age: 60
End: 2025-01-28
Payer: COMMERCIAL

## 2025-01-28 DIAGNOSIS — R10.84 ABDOMINAL PAIN, GENERALIZED: ICD-10-CM

## 2025-01-28 DIAGNOSIS — Z12.31 VISIT FOR SCREENING MAMMOGRAM: ICD-10-CM

## 2025-01-28 PROCEDURE — 71260 CT THORAX DX C+: CPT | Mod: GC | Performed by: RADIOLOGY

## 2025-01-28 PROCEDURE — 77067 SCR MAMMO BI INCL CAD: CPT | Performed by: RADIOLOGY

## 2025-01-28 PROCEDURE — 74177 CT ABD & PELVIS W/CONTRAST: CPT | Mod: GC | Performed by: RADIOLOGY

## 2025-01-28 PROCEDURE — 77063 BREAST TOMOSYNTHESIS BI: CPT | Performed by: RADIOLOGY

## 2025-01-28 RX ORDER — IOPAMIDOL 755 MG/ML
62 INJECTION, SOLUTION INTRAVASCULAR ONCE
Status: COMPLETED | OUTPATIENT
Start: 2025-01-28 | End: 2025-01-28

## 2025-01-28 RX ADMIN — IOPAMIDOL 62 ML: 755 INJECTION, SOLUTION INTRAVASCULAR at 13:31

## 2025-01-28 NOTE — DISCHARGE INSTRUCTIONS

## 2025-01-30 ENCOUNTER — TELEPHONE (OUTPATIENT)
Dept: INTERNAL MEDICINE | Facility: CLINIC | Age: 60
End: 2025-01-30
Payer: MEDICARE

## 2025-01-30 NOTE — TELEPHONE ENCOUNTER
CT chest abdomen pelvis with contrast completed, results discussed and reviewed with radiologist.  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  Diffuse thickening of the jejunal wall also noted which could indicate enteritis.    I called patient this morning 1/30/2025 to discuss laboratory and imaging findings.  Patient continues to endorse worsening weakness and malnourishment, not able to tolerate any p.o. intake.  She continues tube feeds.  She has not been able to see endocrinology, unclear if receiving insulin through insulin pump versus sliding scale/long-acting.  Patient is reporting worsening abdominal pain when compared to last week, generalized.    Given imaging and laboratory findings in combination with patient's worsening symptoms, I recommended patient to present immediately to the [facility].  Patient agrees with plan, will present to the ED this afternoon.  I personally called [facility] and made ER physician aware of patient's arrival and discussed clinical concern.     Abebe Knight DO, PGY3  Internal medicine

## 2025-02-08 ENCOUNTER — HEALTH MAINTENANCE LETTER (OUTPATIENT)
Age: 60
End: 2025-02-08

## 2025-02-13 ENCOUNTER — ANCILLARY PROCEDURE (OUTPATIENT)
Dept: MAMMOGRAPHY | Facility: CLINIC | Age: 60
End: 2025-02-13
Payer: MEDICARE

## 2025-02-13 DIAGNOSIS — R92.8 ABNORMAL MAMMOGRAM OF LEFT BREAST: ICD-10-CM

## 2025-03-04 ENCOUNTER — TELEPHONE (OUTPATIENT)
Dept: TRANSPLANT | Facility: CLINIC | Age: 60
End: 2025-03-04
Payer: MEDICARE

## 2025-03-04 NOTE — TELEPHONE ENCOUNTER
Talked with Chantell this morning and got her appointment moved up to first week in June but will still look for an earlier time for her if it opens up.

## 2025-03-26 RX ORDER — URINE GLUCOSE-ACET TEST STRIP
STRIP MISCELLANEOUS
Qty: 100 STRIP | Refills: 0 | OUTPATIENT
Start: 2025-03-26

## 2025-04-02 ENCOUNTER — DOCUMENTATION ONLY (OUTPATIENT)
Dept: ENDOCRINOLOGY | Facility: CLINIC | Age: 60
End: 2025-04-02
Payer: MEDICARE

## 2025-04-02 DIAGNOSIS — K86.81 EXOCRINE PANCREATIC INSUFFICIENCY: ICD-10-CM

## 2025-04-02 DIAGNOSIS — E13.9 POST-PANCREATECTOMY DIABETES (H): Primary | ICD-10-CM

## 2025-04-02 DIAGNOSIS — K90.9 INTESTINAL MALABSORPTION, UNSPECIFIED: ICD-10-CM

## 2025-04-02 DIAGNOSIS — E89.1 POST-PANCREATECTOMY DIABETES (H): Primary | ICD-10-CM

## 2025-04-02 DIAGNOSIS — Z90.410 POST-PANCREATECTOMY DIABETES (H): Primary | ICD-10-CM

## 2025-04-03 ENCOUNTER — OFFICE VISIT (OUTPATIENT)
Dept: TRANSPLANT | Facility: CLINIC | Age: 60
End: 2025-04-03
Attending: PEDIATRICS
Payer: MEDICARE

## 2025-04-03 ENCOUNTER — LAB (OUTPATIENT)
Dept: LAB | Facility: CLINIC | Age: 60
End: 2025-04-03
Payer: MEDICARE

## 2025-04-03 VITALS
HEART RATE: 88 BPM | BODY MASS INDEX: 17.26 KG/M2 | SYSTOLIC BLOOD PRESSURE: 134 MMHG | RESPIRATION RATE: 16 BRPM | OXYGEN SATURATION: 98 % | WEIGHT: 94.4 LBS | TEMPERATURE: 98 F | DIASTOLIC BLOOD PRESSURE: 83 MMHG

## 2025-04-03 DIAGNOSIS — E10.649 TYPE 1 DIABETES MELLITUS WITH HYPOGLYCEMIA AND WITHOUT COMA (H): ICD-10-CM

## 2025-04-03 DIAGNOSIS — E89.1 POST-PANCREATECTOMY DIABETES (H): ICD-10-CM

## 2025-04-03 DIAGNOSIS — K86.81 EXOCRINE PANCREATIC INSUFFICIENCY: ICD-10-CM

## 2025-04-03 DIAGNOSIS — E10.649 TYPE 1 DIABETES MELLITUS WITH HYPOGLYCEMIA AND WITHOUT COMA (H): Primary | ICD-10-CM

## 2025-04-03 DIAGNOSIS — K90.9 INTESTINAL MALABSORPTION, UNSPECIFIED: ICD-10-CM

## 2025-04-03 DIAGNOSIS — E13.9 POST-PANCREATECTOMY DIABETES (H): ICD-10-CM

## 2025-04-03 DIAGNOSIS — Z90.410 POST-PANCREATECTOMY DIABETES (H): ICD-10-CM

## 2025-04-03 LAB
ALBUMIN SERPL BCG-MCNC: 4.1 G/DL (ref 3.5–5.2)
ALP SERPL-CCNC: 180 U/L (ref 40–150)
ALT SERPL W P-5'-P-CCNC: 168 U/L (ref 0–50)
ANION GAP SERPL CALCULATED.3IONS-SCNC: 17 MMOL/L (ref 7–15)
AST SERPL W P-5'-P-CCNC: 177 U/L (ref 0–45)
BASOPHILS # BLD AUTO: 0 10E3/UL (ref 0–0.2)
BASOPHILS NFR BLD AUTO: 1 %
BILIRUB SERPL-MCNC: 0.4 MG/DL
BUN SERPL-MCNC: 4.2 MG/DL (ref 8–23)
CALCIUM SERPL-MCNC: 9.1 MG/DL (ref 8.8–10.4)
CHLORIDE SERPL-SCNC: 83 MMOL/L (ref 98–107)
CHOLEST SERPL-MCNC: 171 MG/DL
CREAT SERPL-MCNC: 0.3 MG/DL (ref 0.51–0.95)
CREAT UR-MCNC: 6.2 MG/DL
EGFRCR SERPLBLD CKD-EPI 2021: >90 ML/MIN/1.73M2
EOSINOPHIL # BLD AUTO: 0 10E3/UL (ref 0–0.7)
EOSINOPHIL NFR BLD AUTO: 1 %
ERYTHROCYTE [DISTWIDTH] IN BLOOD BY AUTOMATED COUNT: 11.5 % (ref 10–15)
EST. AVERAGE GLUCOSE BLD GHB EST-MCNC: 496 MG/DL
FASTING STATUS PATIENT QL REPORTED: ABNORMAL
FASTING STATUS PATIENT QL REPORTED: ABNORMAL
FERRITIN SERPL-MCNC: 114 NG/ML (ref 11–328)
GLUCOSE SERPL-MCNC: 940 MG/DL (ref 70–99)
HBA1C MFR BLD: 18.9 %
HCO3 SERPL-SCNC: 22 MMOL/L (ref 22–29)
HCT VFR BLD AUTO: 36.8 % (ref 35–47)
HDLC SERPL-MCNC: 81 MG/DL
HGB BLD-MCNC: 13 G/DL (ref 11.7–15.7)
IMM GRANULOCYTES # BLD: 0 10E3/UL
IMM GRANULOCYTES NFR BLD: 0 %
IRON BINDING CAPACITY (ROCHE): 204 UG/DL (ref 240–430)
IRON SATN MFR SERPL: 53 % (ref 15–46)
IRON SERPL-MCNC: 109 UG/DL (ref 37–145)
LDLC SERPL CALC-MCNC: 38 MG/DL
LYMPHOCYTES # BLD AUTO: 1.4 10E3/UL (ref 0.8–5.3)
LYMPHOCYTES NFR BLD AUTO: 26 %
MCH RBC QN AUTO: 30.8 PG (ref 26.5–33)
MCHC RBC AUTO-ENTMCNC: 35.3 G/DL (ref 31.5–36.5)
MCV RBC AUTO: 87 FL (ref 78–100)
MICROALBUMIN UR-MCNC: <12 MG/L
MICROALBUMIN/CREAT UR: NORMAL MG/G{CREAT}
MONOCYTES # BLD AUTO: 0.4 10E3/UL (ref 0–1.3)
MONOCYTES NFR BLD AUTO: 7 %
NEUTROPHILS # BLD AUTO: 3.5 10E3/UL (ref 1.6–8.3)
NEUTROPHILS NFR BLD AUTO: 65 %
NONHDLC SERPL-MCNC: 90 MG/DL
NRBC # BLD AUTO: 0 10E3/UL
NRBC BLD AUTO-RTO: 0 /100
PLATELET # BLD AUTO: 349 10E3/UL (ref 150–450)
POTASSIUM SERPL-SCNC: 3.8 MMOL/L (ref 3.4–5.3)
PREALB SERPL-MCNC: 12.6 MG/DL (ref 20–40)
PROT SERPL-MCNC: 6.7 G/DL (ref 6.4–8.3)
RBC # BLD AUTO: 4.22 10E6/UL (ref 3.8–5.2)
SODIUM SERPL-SCNC: 122 MMOL/L (ref 135–145)
TRIGL SERPL-MCNC: 261 MG/DL
VIT B12 SERPL-MCNC: 1508 PG/ML (ref 232–1245)
WBC # BLD AUTO: 5.4 10E3/UL (ref 4–11)

## 2025-04-03 PROCEDURE — 84681 ASSAY OF C-PEPTIDE: CPT | Performed by: PEDIATRICS

## 2025-04-03 PROCEDURE — 84134 ASSAY OF PREALBUMIN: CPT | Performed by: PEDIATRICS

## 2025-04-03 PROCEDURE — 83036 HEMOGLOBIN GLYCOSYLATED A1C: CPT | Performed by: PEDIATRICS

## 2025-04-03 PROCEDURE — 82570 ASSAY OF URINE CREATININE: CPT

## 2025-04-03 PROCEDURE — 82043 UR ALBUMIN QUANTITATIVE: CPT

## 2025-04-03 PROCEDURE — 82306 VITAMIN D 25 HYDROXY: CPT | Performed by: PEDIATRICS

## 2025-04-03 PROCEDURE — 82607 VITAMIN B-12: CPT | Performed by: PEDIATRICS

## 2025-04-03 RX ORDER — PEN NEEDLE, DIABETIC 32GX 5/32"
NEEDLE, DISPOSABLE MISCELLANEOUS
Qty: 200 EACH | Refills: 5 | Status: SHIPPED | OUTPATIENT
Start: 2025-04-03

## 2025-04-03 RX ORDER — ACYCLOVIR 400 MG/1
TABLET ORAL
Qty: 3 EACH | Refills: 5 | Status: SHIPPED | OUTPATIENT
Start: 2025-04-03

## 2025-04-03 RX ORDER — ACYCLOVIR 400 MG/1
TABLET ORAL
Qty: 1 EACH | Refills: 0 | Status: SHIPPED | OUTPATIENT
Start: 2025-04-03

## 2025-04-03 NOTE — PROGRESS NOTES
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    Post-pancreatectomy diabetes (H)    Type 1 diabetes mellitus with hypoglycemia and without coma (H)    Migraine    Abdominal muscle strain    CIERRA (generalized anxiety disorder)    Major depressive disorder, recurrent episode, moderate (H)    CMC DJD(carpometacarpal degenerative joint disease), localized primary    Exocrine pancreatic insufficiency    Hypothyroidism    Hypoglycemia    Mood disorder due to a general medical condition    Decreased oral intake    Odynophagia    Iron deficiency    Anemia, iron deficiency    Adrenal insufficiency    S/P hernia repair    Nausea    Chondromalacia of left patella    Gastrointestinal hemorrhage, unspecified gastrointestinal hemorrhage type    Age-related osteoporosis without current pathological fracture    Acute cystitis with hematuria    Gastroesophageal reflux disease without esophagitis    RUQ abdominal pain    Weight loss             Assessment:  1. 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  Treated for central adrenal insufficiency.        Chantell is a 57 year old  with history of chronic pancreatitis who is s/p total pancreatectomy and islet autotransplant, with insulin dependence (pump therapy) and partial islet graft function, complicated by insulin resistance, and several episodes of severe hypoglycemia and seizures (E10.641).  She is treated with a continuous subcutaneous insulin infusion pump.     Chantell has post-pancreatectomy diabetes-- essentially a surgical form of type 1 diabetes (E10.641).  She requires an insulin pump for management due to  her multiple episodes of hypoglycemia and hypoglycemia unawareness, including episodes of seizures that required suspension of insulin pump and hospital admits. She also should have a continuous glucose monitor for frequent monitoring because of her severe hypoglycemia history.  Chantell continues to wear a CGM, and needs to be on CGM or test 4 times per day, and we documented during the clinic encounter today use of CGM from review of her logs.  Chantell requires frequent insulin adjustments to her insulin regimen based on her blood glucoses to control hyperglycemia and hypoglycemia.       Chantell has recently upgraded to Omnipod 5.  I think this will be a good regimen for her but we identified some challenges that are preventing ideal control.  For reasons not clear to me, there are many gaps in CGM data in her pump.  She is wearing pump and sensor in close proximity currently.  An even bigger contributor is that she is not bolusing- at all- and she needs to be.  We went through how to do this twice in clinic on her pump.  She is to put in all her carbs before eating (even despite the vomiting- as highs and not lows are the issue right now) and she should hit 'use CGM' for correction dosing when she is running high.    Changes made to cortef dosing, and new emergency kits filled and discussed.      Plan:  1.  Changes to current diabetes regimen:  Patient Instructions   1)  Insulin dose changes to start:  Lantus 16 unit AM and increase to 12 units at night -- this is just a starting point as I anticipate we will need further dose changes and our goal is to get you back on a pump.    2)  Diabetes education - to restart process to get back to the Omnipod 5.  I'd also like to keep monthly educator visits in between visit with me until we get to the right doses for you.    3)  Dexcom G7 sensors and receivers to get restarted on this.    4)  All of the labs pending today to get updated vitamin levels.    5)  You are getting back  into primary care for ongoing management of the tube feeds.    6)  We will refer you to GI as well at [place] for follow up of elevated CEA, abnormal intestinal inflammation on the CT scan, to see if they recommend further testing.       2.  Frequency of blood sugar checks:  Premeal, bedtime, and additional measurements PRN-- Should have strips sufficient to test 8 times per day given her labiltiy history.    3.  Continue routine follow up for autoislet transplant patients:  Mixed meal test (6 mL/kg BoostHP to max of 360 mL) at 3 months, 6 months, and once a year post transplant.  Hemoglobin A1c levels at these time points and quarterly.    4.  Other issues addressed today:  As above        Follow up: as above    Contact me for questions at [phone] or [phone].  Emergency number to reach pediatric endocrinology after hours is [phone].      Dr. Amena Maher MD  [facility] [place]  Director of Research, Islet Auto-transplant Program  Phone:  [phone]  Electronically signed: March 3, 2023 at 8:34 AM            Review of the result(s) of each unique test - Hba1c and pump data.  40 minutes spent on the date of the encounter doing chart review, history and exam, documentation and further activities per the note    HPI:  Chantell is a 55 year old female here for follow up of total pancreatectomy and islet autotransplant performed on January 6, 2012.  At the time of the procedure, the patient received 420,000 IEQ, or 5,438 IEQ/kg body weight.  She did not have diabetes before the procedure.  Early post-operative course was complicated by RLQ with appendicitis, eventually required appendectomy.    Despite the high islet mass transplanted, Chantell's post-operative course was initially remarkable for high insulin needs.  She in fact does have islet function, as previously documented by mixed meal tests, but she was insulin resistant, requiring high doses of  insulin when on MDI therapy.  She also had frequent day to day variability, and fluctuating patterns with illness and healthier times, as well as a complex regimen, all of which make her an excellent candidate for an insulin pump which she started in Feb 2014.  Extremely concerning was an episode of severe hypoglycemia in November 2013 at which time she was found unconscious.  Suspect at that time she was on too much basal insulin and because she was ill and not eating at the time, dropped far too low overnight.   In early 2014, she was started on an insulin pump with CGM.  Remarkably, her insulin needs have dropped dramatically on an insulin pump.  She was then relatively stable until 2016.  She was again admitted to the hospital on March 15 and March 29, 2016 for hypoglycemia, that appears to be secondary to both exogenous insulin and possible central adrenal insufficiency.   At that time she had the following lab findings:  On 3/29/16, elevated insulin with low C-peptide during BG 42 mg/dL around 5pm, and then again same pattern with BG 50s at 10pm.  Chantell is pretty clear that she did not take insuiln that day on 3/29/16. She also had three cortisol levels-- including one during hypoglycemia, one at around 4am (possibly also during hypoglycemia) and one 8am draw that were all low-- all 0.6- 1.6 range.    Of note, she did have a kenalog injection one week earlier on 3/24/16, just a few days prior to that draw and was also receiving narcotics in hospital (but not at home).  She has since had another severe hypogylcemic episode in Jan 2017 -- did not lose consciousness but was disoriented and unable to get help for herself at the store.  And again was admitted for severe hypoglycemia at [facility] on 11/29/2017 (refer to 12/13/17 note) with labs consistent with exogenous insulin overdose although she denied taking any excess insulin (12/1 C-peptide <0.1 and insulin .64.7, 11/30 C-peptide 0.5 and insulin 91).  Additional hypoglycemia admissions: 8/26/18, 9/3/18, with high insulin and low C-peptide c/w hypoglycemia secondary to exogenous insulin (9/3/18 at 6:41pm about 20 hours after last reported insulin dose; insulin 45.6 mU/L, C-peptide 0.2 ng/mL).    Picture is also complicated by non-diabetes history which includes the following :  - 7/6/2016 EGD identified diffuse candidiasis through entire esophagous.  Pt has also had recurrent oral thrush in 2016  - Recurrent chronic abdominal pain  - Recurrent vomiting of unclear etiology but G-T placed 10/2016 and converted to GJ 11/2016 with symptomatic improvement  Unclear if she has any gastroparesis.  Suboptimal study in March 2016 showed 100% retention at 1 hour and then rapid emptying.  Vomiting remains a concern  - Hernia repair performed by general surgery 9/15/16 (TPIAT team not involved).    - Chronic abdominal pain  - Weight loss in 2022, along with persistent vomiting.       New history today:  1)  Diabetes:  This is the first that I have seen Chantell in nearly 2 years, last seen 4/1/2021.    Since I last saw her she has changed over from 670G pump to an omnipod 5 and dexcom which I think is ultimately a better option for her.  Although she claims her pump and sensor are always communicating and that is not a problem, I see lots of times where her CGM is not showing up on her pump download, so I do think this is a problem.  She is also encountering automated insulin delivery alarms and getting kicked out of auto mode.  She is not bolusing at all which is a major contributor to poor control and max delivery alerts that she is seeing.  She did not even know how to bolus when I showed her on the pump, although I am sure that was part of her training.  We went through this today together in the office.    Her A1c last month was up to 13.3%, and is now somewhat improved but still too high at 11%.    Needs new glucagon kit.          2)  Adrenal insufficiency:  Still unclear if  this was transient or true central AI but we have been treating her regardless due to SHE history.  Had been maitained on cortef 20- 25 mg/day.  She is now up to 40 mg/day, which I think may be too generous a dose for long-term w/potential negative effects on diabetes and bone health, so we did decrease modestly again today.  Needs new solucort emergency kit.    3)  Nutritional:  -Creon 12, takes 8 per meal, 5 w snacks- gets steatorrhea with less but seems to do OK at that dose.  - recurrent vomiting - 2 to 3 times per day.  - CT done in course of this that looks like it has a lot of stool, but did not seem to have any concerning findings for obstruction.   - she would like GJ tube because of difficulty eating and maintaining weight.  Is not currently seeing GI.           Review of systems:  A complete Review Of Systems is obtained and negative except as noted above.    Past Medical and Surgical History:  Past Medical History:   Diagnosis Date    Chronic abdominal pain     Chronic pancreatitis (H)     S/P pancreatectomy    Depression with anxiety     Gastro-oesophageal reflux disease     Hypothyroidism 4/23/2015    Kidney stones     Low serum cortisol level     Migraines     Other chronic pain     STOMACH    Other chronic pain     LUMBAR SPINE    Peripheral neuropathy     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    TPIAT    Spasm of sphincter of Oddi      Past Surgical History:   Procedure Laterality Date    ARTHROPLASTY CARPOMETACARPAL (THUMB JOINT)  5/2/2014    Procedure: ARTHROPLASTY CARPOMETACARPAL (THUMB JOINT);  Surgeon: Carina Panda MD;  Location: [facility]    CHOLECYSTECTOMY  2004    COLONOSCOPY  7/18/2014    Procedure: COLONOSCOPY;  Surgeon: Aurora Sahu MD;  Location:  GI    COLONOSCOPY N/A 8/1/2017    Procedure: COLONOSCOPY;  Colonoscopy and upper endoscopy;  Surgeon: Deirdre Harris MD;  Location:  GI    ENDOSCOPIC RETROGRADE CHOLANGIOPANCREATOGRAM      ENDOSCOPIC  RETROGRADE CHOLANGIOPANCREATOGRAM  4/19/2011    Procedure:ENDOSCOPIC RETROGRADE CHOLANGIOPANCREATOGRAM; Pancreatic Stent Placement      ENDOSCOPIC RETROGRADE CHOLANGIOPANCREATOGRAM  5/26/2011    Procedure:ENDOSCOPIC RETROGRADE CHOLANGIOPANCREATOGRAM; with Pancreatic Stent Removal; Surgeon:DALE MIMS; Location:[facility]    ENDOSCOPY UPPER, COLONOSCOPY, COMBINED  4/25/2012    Procedure:COMBINED ENDOSCOPY UPPER, COLONOSCOPY; Enteroscopy with Bile Duct Stent Removal, Colonoscopy  *[facility] Safe Room*; Surgeon:GRACY GODWINIQ; Location[facility]    ESOPHAGOSCOPY, GASTROSCOPY, DUODENOSCOPY (EGD), COMBINED  5/26/2011    Procedure:COMBINED ESOPHAGOSCOPY, GASTROSCOPY, DUODENOSCOPY (EGD); Surgeon:DALE MIMS; [facility]    ESOPHAGOSCOPY, GASTROSCOPY, DUODENOSCOPY (EGD), COMBINED N/A 10/30/2014    Procedure: COMBINED ESOPHAGOSCOPY, GASTROSCOPY, DUODENOSCOPY (EGD), BIOPSY SINGLE OR MULTIPLE;  Surgeon: Sarai Moon MD;  [facility]: [facility]    ESOPHAGOSCOPY, GASTROSCOPY, DUODENOSCOPY (EGD), COMBINED Left 7/6/2015    Procedure: COMBINED ESOPHAGOSCOPY, GASTROSCOPY, DUODENOSCOPY (EGD), BIOPSY SINGLE OR MULTIPLE;  Surgeon: Thomas Estrada MD;  [facility]: [facility]    ESOPHAGOSCOPY, GASTROSCOPY, DUODENOSCOPY (EGD), COMBINED N/A 7/8/2016    Procedure: COMBINED ESOPHAGOSCOPY, GASTROSCOPY, DUODENOSCOPY (EGD), BIOPSY SINGLE OR MULTIPLE;  Surgeon: Eloy Klein MD;  [facility]    ESOPHAGOSCOPY, GASTROSCOPY, DUODENOSCOPY (EGD), COMBINED N/A 8/4/2016    Procedure: COMBINED ESOPHAGOSCOPY, GASTROSCOPY, DUODENOSCOPY (EGD), BIOPSY SINGLE OR MULTIPLE;  Surgeon: Jason Brown MD;  [facility]    ESOPHAGOSCOPY, GASTROSCOPY, DUODENOSCOPY (EGD), COMBINED N/A 8/1/2017    Procedure: COMBINED ESOPHAGOSCOPY, GASTROSCOPY, DUODENOSCOPY (EGD);;  Surgeon: Deirdre Harris MD;  [facility]    ESOPHAGOSCOPY, GASTROSCOPY, DUODENOSCOPY (EGD), COMBINED N/A 6/12/2019    Procedure:  ESOPHAGOGASTRODUODENOSCOPY (EGD);  Surgeon: Jose Francisco Perdomo MD;  Location: [facility]    GYN SURGERY      Hysterectomy and USO    [facility] ENDOSCOPY W EUS  7/20/2011    Procedure:COMBINED ENDOSCOPIC ULTRASOUND, ESOPHAGOSCOPY, GASTROSCOPY, DUODENOSCOPY (EGD); Surgeon:DARVIN DONOHUE; [facility]    HERNIORRHAPHY VENTRAL N/A 9/15/2016    Procedure: HERNIORRHAPHY VENTRAL;  Surgeon: Juanita Bernabe MD;  [facility]    HYSTERECTOMY  1997 or 1998    USO    INCISION AND DRAINAGE ABDOMEN WASHOUT, COMBINED  8/16/2012    Procedure: COMBINED INCISION AND DRAINAGE ABDOMEN WASHOUT;  ,debridement and Drainage Post Appendectomy;  Surgeon: Ron Austin MD;  [facility]    INJECT TRANSVERSUS ABDOMINIS PLANE (TAP) BLOCK BILATERAL Bilateral 5/26/2016    Procedure: INJECT TRANSVERSUS ABDOMINIS PLANE (TAP) BLOCK BILATERAL;  Surgeon: Leonard Mccallum MD;  [facility]    IR NG TUBE PLACEMENT REQ RAD & FLUORO  12/4/2017    LAPAROSCOPIC APPENDECTOMY  7/30/2012    Procedure: LAPAROSCOPIC APPENDECTOMY;  Open Appendectomy;  Surgeon: Ron Austin MD;  [facility]    PANCREATECTOMY, TRANSPLANT AUTO ISLET CELL, COMBINED  1/6/2012    Procedure:COMBINED PANCREATECTOMY, TRANSPLANT AUTO ISLET CELL; Total  Pancreatectomy, Auto Islet Transplant, splenectomy, 18fr. transgastric-jejunal feeding tube placement, liver biopsy; Surgeon:PALAK LEE; [facility]    REPLACE GASTROSTOMY TUBE, PERCUTANEOUS N/A 8/30/2017    Procedure: REPLACE GASTROSTOMY TUBE, PERCUTANEOUS;  GJ Tube Change;  Surgeon: Jose Nath PA-C;  [facility]    SPLENECTOMY         Family History:  New changes since last visit:  none  Family History   Problem Relation Age of Onset    Hypertension Mother     Diabetes Mother     Osteoporosis Mother     Cancer Father         pancreatic cancer    Diabetes Maternal Grandmother     Cardiovascular Maternal Grandmother     Cancer Maternal Grandfather         lung cancer     Cancer Sister         brain    Cancer Sister         liver cancer       Social History:  Social History     Social History Narrative    Not on file      Lives in [place] with her . Previously noted family stress (2018).      Physical Exam:  Vitals: /83 (BP Location: Right arm, Patient Position: Chair, Cuff Size: Adult Regular)   Pulse 88   Temp 98  F (36.7  C) (Oral)   Resp 16   Wt 42.8 kg (94 lb 6.4 oz)   SpO2 98%   BMI 17.26 kg/m    BMI= Body mass index is 17.26 kg/m .   General:  Appearance is normal, no acute distress  HEENT:  NC/AT, sclera appear white  Neck:  No obvious thyromegaly  CV/Lungs:  Non distressed breathing  Skin:  No apparent rashes  Neuro:  Normal mental status  Psych:  Normal affect      Results:      Mixed Meal Test:  C Peptide   Date Value Ref Range Status   04/18/2019 1.8 0.9 - 6.9 ng/mL Final   09/07/2018 2.8 0.9 - 6.9 ng/mL Final   09/06/2018 1.8 0.9 - 6.9 ng/mL Final   09/03/2018 0.2 (L) 0.9 - 6.9 ng/mL Final   08/28/2018 0.3 (L) 0.9 - 6.9 ng/mL Final     Glucose   Date Value Ref Range Status   04/03/2025 940 (HH) 70 - 99 mg/dL Final   01/23/2025 451 (H) 70 - 99 mg/dL Final   03/02/2023 387 (H) 70 - 99 mg/dL Final   09/26/2022 682 (HH) 70 - 99 mg/dL Final   11/12/2020 129 (H) 70 - 99 mg/dL Final   11/11/2020 99 70 - 99 mg/dL Final   11/10/2020 145 (H) 70 - 99 mg/dL Final   11/09/2020 100 (H) 70 - 99 mg/dL Final   11/09/2020 128 (H) 70 - 99 mg/dL Final       Hemoglobin A1c  Lab Results   Component Value Date    A1C 11.1 03/02/2023    A1C 13.3 09/26/2022    A1C 7.3 11/09/2020    A1C 6.7 11/21/2019    A1C 8.2 06/11/2019    A1C Canceled, Test credited 06/10/2019    A1C 9.6 04/18/2019       Liver enzymes  Lab Results   Component Value Date    AST 67 03/02/2023    AST 34 11/09/2020     Lab Results   Component Value Date    ALT 43 03/02/2023    ALT 50 11/09/2020     Lab Results   Component Value Date    BILICONJ 0.0 05/20/2014      Lab Results   Component Value Date     BILITOTAL 0.3 03/02/2023    BILITOTAL 0.4 11/09/2020     Lab Results   Component Value Date    ALBUMIN 4.2 03/02/2023    ALBUMIN 3.2 11/09/2020     Lab Results   Component Value Date    PROTTOTAL 7.1 03/02/2023    PROTTOTAL 6.3 11/09/2020      Lab Results   Component Value Date    ALKPHOS 106 03/02/2023    ALKPHOS 88 11/09/2020       Creatinine:  Creatinine   Date Value Ref Range Status   04/03/2025 0.30 (L) 0.51 - 0.95 mg/dL Final   11/12/2020 0.83 0.52 - 1.04 mg/dL Final   ]    Complete Blood Count:  CBC RESULTS:   Recent Labs   Lab Test 03/02/23  1514   WBC 6.0   RBC 3.94   HGB 12.5   HCT 36.6   MCV 93   MCH 31.7   MCHC 34.2   RDW 12.0          Cholesterol  Lab Results   Component Value Date    CHOL 178 03/02/2023    CHOL 168 07/12/2019     Lab Results   Component Value Date     03/02/2023    HDL 83 07/12/2019     Lab Results   Component Value Date    LDL 59 03/02/2023    LDL 71 07/12/2019     Lab Results   Component Value Date    TRIG 66 03/02/2023    TRIG 73 07/12/2019     Lab Results   Component Value Date    CHOLHDLRATIO 3.2 01/08/2015

## 2025-04-03 NOTE — PATIENT INSTRUCTIONS
1)  Insulin dose changes to start:  Lantus 16 unit AM and increase to 12 units at night -- this is just a starting point as I anticipate we will need further dose changes and our goal is to get you back on a pump.    2)  Diabetes education - to restart process to get back to the Omnipod 5.  I'd also like to keep monthly educator visits in between visit with me until we get to the right doses for you.    3)  Dexcom G7 sensors and receivers to get restarted on this.    4)  All of the labs pending today to get updated vitamin levels.    5)  You are getting back into primary care for ongoing management of the tube feeds.    6)  We will refer you to GI as well at [place] for follow up of elevated CEA, abnormal intestinal inflammation on the CT scan, to see if they recommend further testing.

## 2025-04-03 NOTE — NURSING NOTE
"Chief Complaint   Patient presents with    Follow Up     Return auto islet     Vital signs:  Temp: 98  F (36.7  C) Temp src: Oral BP: 134/83 Pulse: 88   Resp: 16 SpO2: 98 %       Weight: 42.8 kg (94 lb 6.4 oz)  Estimated body mass index is 17.26 kg/m  as calculated from the following:    Height as of 1/23/25: 1.575 m (5' 2.01\").    Weight as of this encounter: 42.8 kg (94 lb 6.4 oz).      Trenton Durbin RN on 4/3/2025 at 2:13 PM    "

## 2025-04-05 LAB — ZINC SERPL-MCNC: 58.4 UG/DL

## 2025-04-07 LAB
A-TOCOPHEROL VIT E SERPL-MCNC: 6.2 MG/L
ANNOTATION COMMENT IMP: ABNORMAL
BETA+GAMMA TOCOPHEROL SERPL-MCNC: 1.9 MG/L
RETINYL PALMITATE SERPL-MCNC: 0.04 MG/L
VIT A SERPL-MCNC: 0.21 MG/L

## 2025-04-08 ENCOUNTER — TELEPHONE (OUTPATIENT)
Dept: EDUCATION SERVICES | Facility: CLINIC | Age: 60
End: 2025-04-08
Payer: MEDICARE

## 2025-04-08 NOTE — TELEPHONE ENCOUNTER
4/8 Called and left voicemail, provided phone number [phone] to schedule an appointment with susan davila or thiago garcia.     [facility]   Orthopedics, Podiatry, Sports Medicine, Ent ,Eye , Audiology, Adult Endocrine & Diabetes, Nutrition & Medication Therapy Management Specialties   [facility]        ----- Message from Susan Mariee sent at 4/8/2025 11:16 AM CDT -----  Regarding: Reach out to schedule  Good morning :)    Can you reach out to this patient to schedule with me or Libby?    Thanks!  Susan

## 2025-04-09 LAB
A-LINOLENATE SERPL-SCNC: 228 NMOL/ML (ref 50–130)
AA SERPL-SCNC: 894 NMOL/ML (ref 520–1490)
ARACHIDATE SERPL-SCNC: 33 NMOL/ML (ref 50–90)
CLINICAL BIOCHEMIST REVIEW: ABNORMAL
DHA SERPL-SCNC: 146 NMOL/ML (ref 30–250)
DOCOSAPENTAENATE W6 SERPL-SCNC: 40 NMOL/ML (ref 10–70)
DOCOSATETRAENOATE SERPL-SCNC: 47 NMOL/ML (ref 10–80)
DOCOSENOATE SERPL-SCNC: 9 NMOL/ML (ref 4–13)
DPA SERPL-SCNC: 113 NMOL/ML (ref 20–210)
EPA SERPL-SCNC: 40 NMOL/ML (ref 14–100)
FA SERPL-SCNC: 16.4 MMOL/L (ref 7.3–16.8)
G-LINOLENATE SERPL-SCNC: 41 NMOL/ML (ref 16–150)
HEXADECENOATE SERPL-SCNC: 82 NMOL/ML (ref 25–105)
HOMO-G LINOLENATE SERPL-SCNC: 172 NMOL/ML (ref 50–250)
LAURATE SERPL-SCNC: 222 NMOL/ML (ref 6–90)
LINOLEATE SERPL-SCNC: 4346 NMOL/ML (ref 2270–3850)
MEAD ACID SERPL-SCNC: 29 NMOL/ML (ref 7–30)
MONOUNSAT FA SERPL-SCNC: 4.3 MMOL/L (ref 1.3–5.8)
MYRISTATE SERPL-SCNC: 435 NMOL/ML (ref 30–450)
NERVONATE SERPL-SCNC: 98 NMOL/ML (ref 60–100)
OCTADECANOATE SERPL-SCNC: 1014 NMOL/ML (ref 590–1170)
OLEATE SERPL-SCNC: 3244 NMOL/ML (ref 650–3500)
PALMITATE SERPL-SCNC: 4171 NMOL/ML (ref 1480–3730)
PALMITOLEATE SERPL-SCNC: 467 NMOL/ML (ref 110–1130)
POLYUNSAT FA SERPL-SCNC: 6.1 MMOL/L (ref 3.2–5.8)
SAT FA SERPL-SCNC: 6 MMOL/L (ref 2.5–5.5)
TRIENOATE/AA SERPL-SRTO: 0.03 {RATIO} (ref 0.01–0.04)
VACCENATE SERPL-SCNC: 321 NMOL/ML (ref 280–740)
W3 FA SERPL-SCNC: 0.5 MMOL/L (ref 0.2–0.5)
W6 FA SERPL-SCNC: 5.5 MMOL/L (ref 3–5.4)

## 2025-04-14 DIAGNOSIS — M54.41 ACUTE BILATERAL LOW BACK PAIN WITH RIGHT-SIDED SCIATICA: ICD-10-CM

## 2025-04-14 RX ORDER — CYCLOBENZAPRINE HCL 5 MG
5 TABLET ORAL 3 TIMES DAILY PRN
Qty: 90 TABLET | Refills: 0 | OUTPATIENT
Start: 2025-04-14

## 2025-04-15 ENCOUNTER — HOSPITAL ENCOUNTER (INPATIENT)
Facility: CLINIC | Age: 60
End: 2025-04-15
Attending: EMERGENCY MEDICINE | Admitting: PEDIATRICS
Payer: MEDICARE

## 2025-04-15 DIAGNOSIS — R73.9 HYPERGLYCEMIA: ICD-10-CM

## 2025-04-15 DIAGNOSIS — E27.40 ADRENAL INSUFFICIENCY: ICD-10-CM

## 2025-04-15 DIAGNOSIS — M54.41 ACUTE BILATERAL LOW BACK PAIN WITH RIGHT-SIDED SCIATICA: ICD-10-CM

## 2025-04-15 DIAGNOSIS — Z46.59 ENCOUNTER FOR FEEDING TUBE PLACEMENT: ICD-10-CM

## 2025-04-15 DIAGNOSIS — E13.9 POST-PANCREATECTOMY DIABETES (H): ICD-10-CM

## 2025-04-15 DIAGNOSIS — R63.8 DECREASED ORAL INTAKE: ICD-10-CM

## 2025-04-15 DIAGNOSIS — K86.81 EXOCRINE PANCREATIC INSUFFICIENCY: ICD-10-CM

## 2025-04-15 DIAGNOSIS — Z94.9 CELL TRANSPLANT: ICD-10-CM

## 2025-04-15 DIAGNOSIS — E16.2 HYPOGLYCEMIA: ICD-10-CM

## 2025-04-15 DIAGNOSIS — R63.4 WEIGHT LOSS: ICD-10-CM

## 2025-04-15 DIAGNOSIS — S39.011A STRAIN OF ABDOMINAL MUSCLE, INITIAL ENCOUNTER: ICD-10-CM

## 2025-04-15 DIAGNOSIS — E10.649 TYPE 1 DIABETES MELLITUS WITH HYPOGLYCEMIA AND WITHOUT COMA (H): ICD-10-CM

## 2025-04-15 DIAGNOSIS — E43 EDEMA DUE TO MALNUTRITION, DUE TO UNSPECIFIED MALNUTRITION TYPE: ICD-10-CM

## 2025-04-15 DIAGNOSIS — R97.0 ELEVATED CEA: ICD-10-CM

## 2025-04-15 DIAGNOSIS — R10.84 GENERALIZED ABDOMINAL PAIN: ICD-10-CM

## 2025-04-15 DIAGNOSIS — T85.528A DISLODGED GASTROSTOMY TUBE: Primary | ICD-10-CM

## 2025-04-15 DIAGNOSIS — R10.9 STOMACH ACHE: ICD-10-CM

## 2025-04-15 DIAGNOSIS — M62.838 MUSCLE SPASM: ICD-10-CM

## 2025-04-15 DIAGNOSIS — Z90.410 POST-PANCREATECTOMY DIABETES (H): ICD-10-CM

## 2025-04-15 DIAGNOSIS — E89.1 POST-PANCREATECTOMY DIABETES (H): ICD-10-CM

## 2025-04-15 LAB
ALBUMIN SERPL BCG-MCNC: 3.9 G/DL (ref 3.5–5.2)
ALP SERPL-CCNC: 186 U/L (ref 40–150)
ALT SERPL W P-5'-P-CCNC: 140 U/L (ref 0–50)
ANION GAP SERPL CALCULATED.3IONS-SCNC: 15 MMOL/L (ref 7–15)
AST SERPL W P-5'-P-CCNC: 213 U/L (ref 0–45)
B-OH-BUTYR SERPL-SCNC: 1.51 MMOL/L
BASE EXCESS BLDV CALC-SCNC: 6.6 MMOL/L (ref -3–3)
BASOPHILS # BLD AUTO: 0 10E3/UL (ref 0–0.2)
BASOPHILS NFR BLD AUTO: 0 %
BILIRUB SERPL-MCNC: 0.3 MG/DL
BUN SERPL-MCNC: 6.1 MG/DL (ref 8–23)
CALCIUM SERPL-MCNC: 8.9 MG/DL (ref 8.8–10.4)
CHLORIDE SERPL-SCNC: 85 MMOL/L (ref 98–107)
CREAT SERPL-MCNC: 0.35 MG/DL (ref 0.51–0.95)
EGFRCR SERPLBLD CKD-EPI 2021: >90 ML/MIN/1.73M2
EOSINOPHIL # BLD AUTO: 0 10E3/UL (ref 0–0.7)
EOSINOPHIL NFR BLD AUTO: 0 %
ERYTHROCYTE [DISTWIDTH] IN BLOOD BY AUTOMATED COUNT: 11.9 % (ref 10–15)
GLUCOSE SERPL-MCNC: 918 MG/DL (ref 70–99)
HCO3 BLDV-SCNC: 32 MMOL/L (ref 21–28)
HCO3 SERPL-SCNC: 23 MMOL/L (ref 22–29)
HCT VFR BLD AUTO: 33.4 % (ref 35–47)
HGB BLD-MCNC: 11.9 G/DL (ref 11.7–15.7)
IMM GRANULOCYTES # BLD: 0 10E3/UL
IMM GRANULOCYTES NFR BLD: 0 %
LACTATE SERPL-SCNC: 1.1 MMOL/L (ref 0.7–2)
LIPASE SERPL-CCNC: 7 U/L (ref 13–60)
LYMPHOCYTES # BLD AUTO: 1.1 10E3/UL (ref 0.8–5.3)
LYMPHOCYTES NFR BLD AUTO: 11 %
MCH RBC QN AUTO: 31.2 PG (ref 26.5–33)
MCHC RBC AUTO-ENTMCNC: 35.6 G/DL (ref 31.5–36.5)
MCV RBC AUTO: 88 FL (ref 78–100)
MONOCYTES # BLD AUTO: 0.7 10E3/UL (ref 0–1.3)
MONOCYTES NFR BLD AUTO: 7 %
NEUTROPHILS # BLD AUTO: 8.1 10E3/UL (ref 1.6–8.3)
NEUTROPHILS NFR BLD AUTO: 81 %
NRBC # BLD AUTO: 0 10E3/UL
NRBC BLD AUTO-RTO: 0 /100
O2/TOTAL GAS SETTING VFR VENT: 21 %
OXYHGB MFR BLDV: 92 % (ref 70–75)
PCO2 BLDV: 48 MM HG (ref 40–50)
PH BLDV: 7.43 [PH] (ref 7.32–7.43)
PLATELET # BLD AUTO: 330 10E3/UL (ref 150–450)
PO2 BLDV: 66 MM HG (ref 25–47)
POTASSIUM SERPL-SCNC: 3.6 MMOL/L (ref 3.4–5.3)
PROT SERPL-MCNC: 6.2 G/DL (ref 6.4–8.3)
RBC # BLD AUTO: 3.81 10E6/UL (ref 3.8–5.2)
SAO2 % BLDV: 93.6 % (ref 70–75)
SODIUM SERPL-SCNC: 123 MMOL/L (ref 135–145)
WBC # BLD AUTO: 10 10E3/UL (ref 4–11)

## 2025-04-15 PROCEDURE — 82040 ASSAY OF SERUM ALBUMIN: CPT | Performed by: EMERGENCY MEDICINE

## 2025-04-15 PROCEDURE — 82962 GLUCOSE BLOOD TEST: CPT

## 2025-04-15 PROCEDURE — 43999 UNLISTED PROCEDURE STOMACH: CPT | Performed by: EMERGENCY MEDICINE

## 2025-04-15 PROCEDURE — 82805 BLOOD GASES W/O2 SATURATION: CPT | Performed by: EMERGENCY MEDICINE

## 2025-04-15 PROCEDURE — 83690 ASSAY OF LIPASE: CPT | Performed by: EMERGENCY MEDICINE

## 2025-04-15 PROCEDURE — 258N000003 HC RX IP 258 OP 636: Performed by: EMERGENCY MEDICINE

## 2025-04-15 PROCEDURE — 250N000009 HC RX 250: Mod: JW | Performed by: EMERGENCY MEDICINE

## 2025-04-15 PROCEDURE — 99291 CRITICAL CARE FIRST HOUR: CPT | Mod: 25 | Performed by: EMERGENCY MEDICINE

## 2025-04-15 PROCEDURE — 85025 COMPLETE CBC W/AUTO DIFF WBC: CPT | Performed by: EMERGENCY MEDICINE

## 2025-04-15 PROCEDURE — 82010 KETONE BODYS QUAN: CPT | Performed by: EMERGENCY MEDICINE

## 2025-04-15 PROCEDURE — 36415 COLL VENOUS BLD VENIPUNCTURE: CPT | Performed by: EMERGENCY MEDICINE

## 2025-04-15 PROCEDURE — 99285 EMERGENCY DEPT VISIT HI MDM: CPT | Mod: 25 | Performed by: EMERGENCY MEDICINE

## 2025-04-15 PROCEDURE — 83036 HEMOGLOBIN GLYCOSYLATED A1C: CPT | Performed by: EMERGENCY MEDICINE

## 2025-04-15 PROCEDURE — 96365 THER/PROPH/DIAG IV INF INIT: CPT | Performed by: EMERGENCY MEDICINE

## 2025-04-15 PROCEDURE — G0378 HOSPITAL OBSERVATION PER HR: HCPCS

## 2025-04-15 PROCEDURE — 83605 ASSAY OF LACTIC ACID: CPT | Performed by: EMERGENCY MEDICINE

## 2025-04-15 PROCEDURE — 96361 HYDRATE IV INFUSION ADD-ON: CPT | Performed by: EMERGENCY MEDICINE

## 2025-04-15 PROCEDURE — 96375 TX/PRO/DX INJ NEW DRUG ADDON: CPT | Performed by: EMERGENCY MEDICINE

## 2025-04-15 PROCEDURE — 96368 THER/DIAG CONCURRENT INF: CPT | Performed by: EMERGENCY MEDICINE

## 2025-04-15 PROCEDURE — 250N000011 HC RX IP 250 OP 636: Performed by: EMERGENCY MEDICINE

## 2025-04-15 RX ORDER — DEXTROSE MONOHYDRATE 25 G/50ML
25-50 INJECTION, SOLUTION INTRAVENOUS
Status: DISCONTINUED | OUTPATIENT
Start: 2025-04-15 | End: 2025-04-24 | Stop reason: HOSPADM

## 2025-04-15 RX ORDER — IOPAMIDOL 755 MG/ML
57 INJECTION, SOLUTION INTRAVASCULAR ONCE
Status: COMPLETED | OUTPATIENT
Start: 2025-04-15 | End: 2025-04-16

## 2025-04-15 RX ORDER — DEXTROSE MONOHYDRATE 100 MG/ML
INJECTION, SOLUTION INTRAVENOUS CONTINUOUS PRN
Status: DISCONTINUED | OUTPATIENT
Start: 2025-04-15 | End: 2025-04-17

## 2025-04-15 RX ORDER — NICOTINE POLACRILEX 4 MG
15-30 LOZENGE BUCCAL
Status: DISCONTINUED | OUTPATIENT
Start: 2025-04-15 | End: 2025-04-24 | Stop reason: HOSPADM

## 2025-04-15 RX ORDER — ONDANSETRON 2 MG/ML
4 INJECTION INTRAMUSCULAR; INTRAVENOUS ONCE
Status: COMPLETED | OUTPATIENT
Start: 2025-04-15 | End: 2025-04-15

## 2025-04-15 RX ORDER — CYCLOBENZAPRINE HCL 5 MG
5 TABLET ORAL 3 TIMES DAILY PRN
Qty: 90 TABLET | Refills: 0 | OUTPATIENT
Start: 2025-04-15

## 2025-04-15 RX ORDER — POTASSIUM CHLORIDE 7.45 MG/ML
10 INJECTION INTRAVENOUS ONCE
Status: COMPLETED | OUTPATIENT
Start: 2025-04-15 | End: 2025-04-16

## 2025-04-15 RX ADMIN — PANTOPRAZOLE SODIUM 40 MG: 40 INJECTION, POWDER, FOR SOLUTION INTRAVENOUS at 21:31

## 2025-04-15 RX ADMIN — ONDANSETRON 4 MG: 2 INJECTION, SOLUTION INTRAMUSCULAR; INTRAVENOUS at 21:31

## 2025-04-15 RX ADMIN — SODIUM CHLORIDE 1000 ML: 9 INJECTION, SOLUTION INTRAVENOUS at 22:45

## 2025-04-15 RX ADMIN — POTASSIUM CHLORIDE 10 MEQ: 7.46 INJECTION, SOLUTION INTRAVENOUS at 22:55

## 2025-04-15 RX ADMIN — INSULIN HUMAN 5.5 UNITS/HR: 1 INJECTION, SOLUTION INTRAVENOUS at 22:57

## 2025-04-15 RX ADMIN — Medication 10 MG: at 21:53

## 2025-04-15 ASSESSMENT — ACTIVITIES OF DAILY LIVING (ADL)
ADLS_ACUITY_SCORE: 51

## 2025-04-15 ASSESSMENT — COLUMBIA-SUICIDE SEVERITY RATING SCALE - C-SSRS
1. IN THE PAST MONTH, HAVE YOU WISHED YOU WERE DEAD OR WISHED YOU COULD GO TO SLEEP AND NOT WAKE UP?: NO
2. HAVE YOU ACTUALLY HAD ANY THOUGHTS OF KILLING YOURSELF IN THE PAST MONTH?: NO
6. HAVE YOU EVER DONE ANYTHING, STARTED TO DO ANYTHING, OR PREPARED TO DO ANYTHING TO END YOUR LIFE?: NO

## 2025-04-15 NOTE — TELEPHONE ENCOUNTER
1. Refill request received from: [facility]  2. Medication Requested: Cyclobenzaprine hcl 5mg tabs 5  3. Directions:take on tablet by mouth three times a day as needed for muscle spasms  4. Quantity:90  5. Last Office Visit: 4/3/25                    Has it been over a year since the last appointment (6 months for diabetes)? No                    If No:     Move on to next question.                    If Yes:                      Change refill quantity to 1 month.                      Route to Provider or Pool & let them know its been over a year since patient has been seen.                      If they do not have an upcoming appointment- reach out to family to schedule or route to .  6. Next Appointment Scheduled for: 8/21/25  7. Last refill: 2/8/23  8. Sent To: DIABETES POOL

## 2025-04-16 ENCOUNTER — APPOINTMENT (OUTPATIENT)
Dept: CT IMAGING | Facility: CLINIC | Age: 60
DRG: 393 | End: 2025-04-16
Attending: EMERGENCY MEDICINE
Payer: MEDICARE

## 2025-04-16 ENCOUNTER — ANESTHESIA EVENT (OUTPATIENT)
Dept: SURGERY | Facility: CLINIC | Age: 60
End: 2025-04-16
Payer: MEDICARE

## 2025-04-16 LAB
EST. AVERAGE GLUCOSE BLD GHB EST-MCNC: 501 MG/DL
GLUCOSE BLDC GLUCOMTR-MCNC: 111 MG/DL (ref 70–99)
GLUCOSE BLDC GLUCOMTR-MCNC: 117 MG/DL (ref 70–99)
GLUCOSE BLDC GLUCOMTR-MCNC: 118 MG/DL (ref 70–99)
GLUCOSE BLDC GLUCOMTR-MCNC: 123 MG/DL (ref 70–99)
GLUCOSE BLDC GLUCOMTR-MCNC: 125 MG/DL (ref 70–99)
GLUCOSE BLDC GLUCOMTR-MCNC: 126 MG/DL (ref 70–99)
GLUCOSE BLDC GLUCOMTR-MCNC: 128 MG/DL (ref 70–99)
GLUCOSE BLDC GLUCOMTR-MCNC: 148 MG/DL (ref 70–99)
GLUCOSE BLDC GLUCOMTR-MCNC: 152 MG/DL (ref 70–99)
GLUCOSE BLDC GLUCOMTR-MCNC: 217 MG/DL (ref 70–99)
GLUCOSE BLDC GLUCOMTR-MCNC: 239 MG/DL (ref 70–99)
GLUCOSE BLDC GLUCOMTR-MCNC: 250 MG/DL (ref 70–99)
GLUCOSE BLDC GLUCOMTR-MCNC: 311 MG/DL (ref 70–99)
GLUCOSE BLDC GLUCOMTR-MCNC: 315 MG/DL (ref 70–99)
GLUCOSE BLDC GLUCOMTR-MCNC: 323 MG/DL (ref 70–99)
GLUCOSE BLDC GLUCOMTR-MCNC: 370 MG/DL (ref 70–99)
GLUCOSE BLDC GLUCOMTR-MCNC: 431 MG/DL (ref 70–99)
GLUCOSE BLDC GLUCOMTR-MCNC: 454 MG/DL (ref 70–99)
GLUCOSE BLDC GLUCOMTR-MCNC: 518 MG/DL (ref 70–99)
GLUCOSE BLDC GLUCOMTR-MCNC: 98 MG/DL (ref 70–99)
GLUCOSE BLDC GLUCOMTR-MCNC: 98 MG/DL (ref 70–99)
HBA1C MFR BLD: 19.1 %

## 2025-04-16 PROCEDURE — 74177 CT ABD & PELVIS W/CONTRAST: CPT | Mod: 26 | Performed by: RADIOLOGY

## 2025-04-16 PROCEDURE — 99223 1ST HOSP IP/OBS HIGH 75: CPT | Mod: AI | Performed by: PEDIATRICS

## 2025-04-16 PROCEDURE — 250N000013 HC RX MED GY IP 250 OP 250 PS 637

## 2025-04-16 PROCEDURE — 250N000009 HC RX 250: Performed by: EMERGENCY MEDICINE

## 2025-04-16 PROCEDURE — 99222 1ST HOSP IP/OBS MODERATE 55: CPT

## 2025-04-16 PROCEDURE — 120N000002 HC R&B MED SURG/OB UMMC

## 2025-04-16 PROCEDURE — G0378 HOSPITAL OBSERVATION PER HR: HCPCS

## 2025-04-16 PROCEDURE — 74177 CT ABD & PELVIS W/CONTRAST: CPT

## 2025-04-16 PROCEDURE — 96366 THER/PROPH/DIAG IV INF ADDON: CPT

## 2025-04-16 PROCEDURE — 250N000011 HC RX IP 250 OP 636: Performed by: EMERGENCY MEDICINE

## 2025-04-16 PROCEDURE — 82962 GLUCOSE BLOOD TEST: CPT

## 2025-04-16 PROCEDURE — 99223 1ST HOSP IP/OBS HIGH 75: CPT | Performed by: PHYSICIAN ASSISTANT

## 2025-04-16 PROCEDURE — 99207 PR NO BILLABLE SERVICE THIS VISIT: CPT

## 2025-04-16 PROCEDURE — 96376 TX/PRO/DX INJ SAME DRUG ADON: CPT

## 2025-04-16 PROCEDURE — 250N000009 HC RX 250: Performed by: HOSPITALIST

## 2025-04-16 PROCEDURE — 99223 1ST HOSP IP/OBS HIGH 75: CPT

## 2025-04-16 RX ORDER — ONDANSETRON 4 MG/1
4 TABLET, ORALLY DISINTEGRATING ORAL EVERY 6 HOURS PRN
Status: DISCONTINUED | OUTPATIENT
Start: 2025-04-16 | End: 2025-04-24 | Stop reason: HOSPADM

## 2025-04-16 RX ORDER — LIDOCAINE 4 G/G
3 PATCH TOPICAL
Status: DISCONTINUED | OUTPATIENT
Start: 2025-04-17 | End: 2025-04-24 | Stop reason: HOSPADM

## 2025-04-16 RX ORDER — NALOXONE HYDROCHLORIDE 0.4 MG/ML
0.2 INJECTION, SOLUTION INTRAMUSCULAR; INTRAVENOUS; SUBCUTANEOUS
Status: DISCONTINUED | OUTPATIENT
Start: 2025-04-16 | End: 2025-04-24 | Stop reason: HOSPADM

## 2025-04-16 RX ORDER — TRAZODONE HYDROCHLORIDE 100 MG/1
100 TABLET ORAL AT BEDTIME
Status: DISCONTINUED | OUTPATIENT
Start: 2025-04-16 | End: 2025-04-24 | Stop reason: HOSPADM

## 2025-04-16 RX ORDER — POLYETHYLENE GLYCOL 3350 17 G/17G
17 POWDER, FOR SOLUTION ORAL DAILY
Status: DISCONTINUED | OUTPATIENT
Start: 2025-04-17 | End: 2025-04-21

## 2025-04-16 RX ORDER — ONDANSETRON 4 MG/1
4 TABLET, FILM COATED ORAL EVERY 8 HOURS PRN
Status: DISCONTINUED | OUTPATIENT
Start: 2025-04-16 | End: 2025-04-16

## 2025-04-16 RX ORDER — DRONABINOL 2.5 MG/1
5 CAPSULE ORAL 2 TIMES DAILY PRN
Status: DISCONTINUED | OUTPATIENT
Start: 2025-04-16 | End: 2025-04-24 | Stop reason: HOSPADM

## 2025-04-16 RX ORDER — ACETAMINOPHEN 325 MG/1
975 TABLET ORAL EVERY 8 HOURS
Status: DISCONTINUED | OUTPATIENT
Start: 2025-04-16 | End: 2025-04-24 | Stop reason: HOSPADM

## 2025-04-16 RX ORDER — ACETAMINOPHEN 325 MG/1
650 TABLET ORAL EVERY 4 HOURS PRN
Status: DISCONTINUED | OUTPATIENT
Start: 2025-04-16 | End: 2025-04-16

## 2025-04-16 RX ORDER — OXYCODONE HYDROCHLORIDE 5 MG/1
5 TABLET ORAL EVERY 4 HOURS PRN
Status: DISCONTINUED | OUTPATIENT
Start: 2025-04-16 | End: 2025-04-16

## 2025-04-16 RX ORDER — SUCRALFATE 1 G/1
1 TABLET ORAL
Status: DISCONTINUED | OUTPATIENT
Start: 2025-04-16 | End: 2025-04-24 | Stop reason: HOSPADM

## 2025-04-16 RX ORDER — DULOXETIN HYDROCHLORIDE 60 MG/1
60 CAPSULE, DELAYED RELEASE ORAL DAILY
Status: DISCONTINUED | OUTPATIENT
Start: 2025-04-16 | End: 2025-04-16

## 2025-04-16 RX ORDER — DEXTROSE MONOHYDRATE 100 MG/ML
INJECTION, SOLUTION INTRAVENOUS CONTINUOUS PRN
Status: DISCONTINUED | OUTPATIENT
Start: 2025-04-16 | End: 2025-04-24 | Stop reason: HOSPADM

## 2025-04-16 RX ORDER — DICYCLOMINE HYDROCHLORIDE 10 MG/1
10 CAPSULE ORAL EVERY 6 HOURS
Status: DISCONTINUED | OUTPATIENT
Start: 2025-04-16 | End: 2025-04-24 | Stop reason: HOSPADM

## 2025-04-16 RX ORDER — NALOXONE HYDROCHLORIDE 0.4 MG/ML
0.4 INJECTION, SOLUTION INTRAMUSCULAR; INTRAVENOUS; SUBCUTANEOUS
Status: DISCONTINUED | OUTPATIENT
Start: 2025-04-16 | End: 2025-04-24 | Stop reason: HOSPADM

## 2025-04-16 RX ORDER — OXYCODONE HYDROCHLORIDE 5 MG/1
5 TABLET ORAL EVERY 4 HOURS PRN
Status: DISCONTINUED | OUTPATIENT
Start: 2025-04-16 | End: 2025-04-19

## 2025-04-16 RX ORDER — POLYETHYLENE GLYCOL 3350 17 G/17G
17 POWDER, FOR SOLUTION ORAL 2 TIMES DAILY PRN
Status: DISCONTINUED | OUTPATIENT
Start: 2025-04-16 | End: 2025-04-24 | Stop reason: HOSPADM

## 2025-04-16 RX ORDER — METOCLOPRAMIDE 5 MG/1
10 TABLET ORAL 3 TIMES DAILY
Status: DISCONTINUED | OUTPATIENT
Start: 2025-04-16 | End: 2025-04-24 | Stop reason: HOSPADM

## 2025-04-16 RX ORDER — GABAPENTIN 100 MG/1
100 CAPSULE ORAL 3 TIMES DAILY
Status: DISCONTINUED | OUTPATIENT
Start: 2025-04-16 | End: 2025-04-24 | Stop reason: HOSPADM

## 2025-04-16 RX ORDER — CYCLOBENZAPRINE HCL 5 MG
5 TABLET ORAL
Status: DISCONTINUED | OUTPATIENT
Start: 2025-04-16 | End: 2025-04-24 | Stop reason: HOSPADM

## 2025-04-16 RX ORDER — PROCHLORPERAZINE MALEATE 5 MG/1
10 TABLET ORAL EVERY 6 HOURS PRN
Status: DISCONTINUED | OUTPATIENT
Start: 2025-04-16 | End: 2025-04-24 | Stop reason: HOSPADM

## 2025-04-16 RX ORDER — GUAIFENESIN 600 MG/1
15 TABLET, EXTENDED RELEASE ORAL DAILY
Status: DISCONTINUED | OUTPATIENT
Start: 2025-04-16 | End: 2025-04-24 | Stop reason: HOSPADM

## 2025-04-16 RX ORDER — CYCLOBENZAPRINE HCL 5 MG
5 TABLET ORAL 3 TIMES DAILY PRN
Status: DISCONTINUED | OUTPATIENT
Start: 2025-04-16 | End: 2025-04-24 | Stop reason: HOSPADM

## 2025-04-16 RX ORDER — AMOXICILLIN 250 MG
2 CAPSULE ORAL 2 TIMES DAILY PRN
Status: DISCONTINUED | OUTPATIENT
Start: 2025-04-16 | End: 2025-04-21

## 2025-04-16 RX ORDER — AMOXICILLIN 250 MG
1-2 CAPSULE ORAL DAILY PRN
Status: DISCONTINUED | OUTPATIENT
Start: 2025-04-16 | End: 2025-04-16

## 2025-04-16 RX ORDER — AMOXICILLIN 250 MG
1 CAPSULE ORAL 2 TIMES DAILY PRN
Status: DISCONTINUED | OUTPATIENT
Start: 2025-04-16 | End: 2025-04-21

## 2025-04-16 RX ORDER — POTASSIUM CHLORIDE 750 MG/1
20 TABLET, EXTENDED RELEASE ORAL DAILY
Status: DISCONTINUED | OUTPATIENT
Start: 2025-04-16 | End: 2025-04-24 | Stop reason: HOSPADM

## 2025-04-16 RX ORDER — PANTOPRAZOLE SODIUM 40 MG/1
40 TABLET, DELAYED RELEASE ORAL 2 TIMES DAILY
Status: DISCONTINUED | OUTPATIENT
Start: 2025-04-16 | End: 2025-04-24 | Stop reason: HOSPADM

## 2025-04-16 RX ORDER — FAMOTIDINE 20 MG/1
20 TABLET, FILM COATED ORAL AT BEDTIME
Status: DISCONTINUED | OUTPATIENT
Start: 2025-04-16 | End: 2025-04-24 | Stop reason: HOSPADM

## 2025-04-16 RX ORDER — LEVOTHYROXINE SODIUM 112 UG/1
112 TABLET ORAL DAILY
Status: DISCONTINUED | OUTPATIENT
Start: 2025-04-16 | End: 2025-04-19

## 2025-04-16 RX ORDER — HYDROCORTISONE 10 MG/1
10 TABLET ORAL DAILY
Status: DISCONTINUED | OUTPATIENT
Start: 2025-04-16 | End: 2025-04-24 | Stop reason: HOSPADM

## 2025-04-16 RX ORDER — TOPIRAMATE 50 MG/1
100 TABLET, FILM COATED ORAL 2 TIMES DAILY
Status: DISCONTINUED | OUTPATIENT
Start: 2025-04-16 | End: 2025-04-24 | Stop reason: HOSPADM

## 2025-04-16 RX ORDER — SUMATRIPTAN 50 MG/1
50 TABLET, FILM COATED ORAL
Status: DISCONTINUED | OUTPATIENT
Start: 2025-04-16 | End: 2025-04-24 | Stop reason: HOSPADM

## 2025-04-16 RX ADMIN — METOCLOPRAMIDE 10 MG: 5 TABLET ORAL at 08:36

## 2025-04-16 RX ADMIN — POTASSIUM CHLORIDE 20 MEQ: 750 TABLET, EXTENDED RELEASE ORAL at 08:37

## 2025-04-16 RX ADMIN — TRAZODONE HYDROCHLORIDE 100 MG: 100 TABLET ORAL at 22:49

## 2025-04-16 RX ADMIN — DICYCLOMINE HYDROCHLORIDE 10 MG: 10 CAPSULE ORAL at 08:38

## 2025-04-16 RX ADMIN — OXYCODONE HYDROCHLORIDE 5 MG: 5 TABLET ORAL at 12:37

## 2025-04-16 RX ADMIN — INSULIN HUMAN 2 UNITS/HR: 1 INJECTION, SOLUTION INTRAVENOUS at 04:27

## 2025-04-16 RX ADMIN — PANTOPRAZOLE SODIUM 40 MG: 40 TABLET, DELAYED RELEASE ORAL at 20:21

## 2025-04-16 RX ADMIN — HYDROCORTISONE 10 MG: 10 TABLET ORAL at 08:36

## 2025-04-16 RX ADMIN — Medication 10 MG: at 03:12

## 2025-04-16 RX ADMIN — LEVOTHYROXINE SODIUM 112 MCG: 0.11 TABLET ORAL at 08:37

## 2025-04-16 RX ADMIN — ACETAMINOPHEN 975 MG: 325 TABLET, FILM COATED ORAL at 22:49

## 2025-04-16 RX ADMIN — DICYCLOMINE HYDROCHLORIDE 10 MG: 10 CAPSULE ORAL at 12:26

## 2025-04-16 RX ADMIN — DULOXETINE 90 MG: 60 CAPSULE, DELAYED RELEASE ORAL at 08:36

## 2025-04-16 RX ADMIN — GABAPENTIN 100 MG: 100 CAPSULE ORAL at 15:42

## 2025-04-16 RX ADMIN — LINACLOTIDE 145 MCG: 145 CAPSULE, GELATIN COATED ORAL at 12:26

## 2025-04-16 RX ADMIN — IOPAMIDOL 57 ML: 755 INJECTION, SOLUTION INTRAVENOUS at 00:44

## 2025-04-16 RX ADMIN — SUCRALFATE 1 G: 1 TABLET ORAL at 12:26

## 2025-04-16 RX ADMIN — METOCLOPRAMIDE 10 MG: 5 TABLET ORAL at 15:42

## 2025-04-16 RX ADMIN — DICYCLOMINE HYDROCHLORIDE 10 MG: 10 CAPSULE ORAL at 23:17

## 2025-04-16 RX ADMIN — PANTOPRAZOLE SODIUM 40 MG: 40 TABLET, DELAYED RELEASE ORAL at 08:36

## 2025-04-16 RX ADMIN — SUCRALFATE 1 G: 1 TABLET ORAL at 15:43

## 2025-04-16 RX ADMIN — TOPIRAMATE 100 MG: 50 TABLET, FILM COATED ORAL at 08:37

## 2025-04-16 RX ADMIN — THIAMINE HCL TAB 100 MG 100 MG: 100 TAB at 15:42

## 2025-04-16 RX ADMIN — FAMOTIDINE 20 MG: 20 TABLET, FILM COATED ORAL at 22:49

## 2025-04-16 RX ADMIN — METOCLOPRAMIDE 10 MG: 5 TABLET ORAL at 20:21

## 2025-04-16 RX ADMIN — Medication 15 ML: at 15:42

## 2025-04-16 RX ADMIN — OXYCODONE HYDROCHLORIDE 5 MG: 5 TABLET ORAL at 08:47

## 2025-04-16 RX ADMIN — SUCRALFATE 1 G: 1 TABLET ORAL at 08:36

## 2025-04-16 RX ADMIN — ACETAMINOPHEN 975 MG: 325 TABLET, FILM COATED ORAL at 15:42

## 2025-04-16 RX ADMIN — ACETAMINOPHEN 650 MG: 325 TABLET, FILM COATED ORAL at 08:47

## 2025-04-16 RX ADMIN — SUCRALFATE 1 G: 1 TABLET ORAL at 22:50

## 2025-04-16 RX ADMIN — OXYCODONE HYDROCHLORIDE 5 MG: 5 TABLET ORAL at 17:31

## 2025-04-16 RX ADMIN — HYDROCORTISONE 15 MG: 10 TABLET ORAL at 22:51

## 2025-04-16 RX ADMIN — TOPIRAMATE 100 MG: 50 TABLET, FILM COATED ORAL at 20:21

## 2025-04-16 RX ADMIN — DICYCLOMINE HYDROCHLORIDE 10 MG: 10 CAPSULE ORAL at 17:31

## 2025-04-16 RX ADMIN — GABAPENTIN 100 MG: 100 CAPSULE ORAL at 20:21

## 2025-04-16 ASSESSMENT — ACTIVITIES OF DAILY LIVING (ADL)
ADLS_ACUITY_SCORE: 53
ADLS_ACUITY_SCORE: 51
ADLS_ACUITY_SCORE: 53
ADLS_ACUITY_SCORE: 51
ADLS_ACUITY_SCORE: 53
ADLS_ACUITY_SCORE: 51
ADLS_ACUITY_SCORE: 53
ADLS_ACUITY_SCORE: 51
ADLS_ACUITY_SCORE: 51
ADLS_ACUITY_SCORE: 53
ADLS_ACUITY_SCORE: 53
ADLS_ACUITY_SCORE: 51
ADLS_ACUITY_SCORE: 53
ADLS_ACUITY_SCORE: 51
ADLS_ACUITY_SCORE: 53
ADLS_ACUITY_SCORE: 51
ADLS_ACUITY_SCORE: 53
ADLS_ACUITY_SCORE: 53

## 2025-04-16 NOTE — PROGRESS NOTES
CLINICAL NUTRITION SERVICES - ASSESSMENT NOTE    RECOMMENDATIONS FOR MDs/PROVIDERS TO ORDER:  Please consult care coordinator to work on coordination of home infusion. Pt states she is currently receiving TF through a [place] based company but would like to transfer care to [facility].  Patient does not need to take Creon unless tolerating more oral intake than Clear liquids. Per pt sounds like she is primarily tolerating liquids orally. Current dose within appropriate range for meals(however, adjust based on weight and PO intake)  Optimize antiemetics  Adjust insulin with initiation of TF's  Monitor lytes/fluids and replace PRN  Malnutrition Status:    Severe malnutrition in the context of chronic illness    Registered Dietitian Interventions:  EN access: J tube   Goal TF: [employer] Peptide 1.5 (or equivalent) @ 45 mL/hr (1080 mL/day) to provide 1661 kcal (36 kcal/kg), 80 g protein (1.75 g/kg), 149 g CHO, 16 g fiber, 83 g fat (40% from MCTs), and 756 mL free water daily   Initiate @ 10 ml/hr and advance by 10 ml Q8H pending pt's tolerance.  Do not advance TF rate unless Mg++ > 1.5, K+ is > 3, and phos > 1.9  60 ml Q4H fluid flushes for tube patency. Additional fluids and/or adjustments per MD.    Multivitamin/minerals to help ensure micronutrient needs being met with suspected hypermetabolic demands and potential interruptions to TF infusions.    100 mg Thiamine x 5-7 days to prevent lyte depletion d/t at risk for refeeding syndrome  RN:  For volumes 5268-7643 ml (rates 41-60 ml/hr) - use 3 cartridges daily (1 cartridge q 8 hours)   RN: Obtain cartridges  call [id number] ([facility]) and request peoplesoft #[id number]    PO intake as tolerated.     Future/Additional Recommendations:  Monitor nutrition related findings and follow up per protocol     REASON FOR ASSESSMENT  Provider order - Registered Dietitian to order TF per Medical Nutrition Therapy Guidelines    SUBJECTIVE INFORMATION  Assessed patient in room.    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     Per note in care everywhere from 4/2025   She had a CT that did not show any clear tumor but she had pneumotosis in the colon, her GJ (which was placed in [place]) was coiled in her stomach, and enteritis. That was back in Jan. At that time, the primary clinic were not willing to manage GJ until further evaluation of pneumotosis and recommended she go to the ED which she declined to do. I'm not sure how she is currently getting feeds managed but it seems she still has formula from her care in [place], and she continues to run this 24/7 and is not having any issues with this. However, she uses NO pancreatic enzymes and NO relizorb so ultimately she will have significant fat malabsorption of this formula on that regimen.    NUTRITION HISTORY  Pt reports PO intake PTA of water, coffee, herbal tea and small amounts of softer foods such as soup. Receives TF through a [place] based company but states she would like to get set up with [facility]. Reports TF's at 60ml/hr x24 hours without any enzymes. Has been having frequency N/V and oil diarrhea. Pt reports bloating and reflux. Denies any additional supplements. Food allergy to chocolate. Reports ongoing weight loss likely in part related to malabsorption of TF's without enzyme coverage. Reports compliance with Creon when having PO intake, reports she was taking this PTA.     Home nutrition support plan: pt reports using a [place] based home infusion company. Leviair?? Could not find after searching for it  Per patient report, pt receives diabetisource at 60 ml/hr x 24hours via J tube. Denies receiving additional fluids through feeding tube. Provides  "1440 ml, 1728 kcals, 22 g fiber, 86 g protein, 85 g fat, 1175 ml free water, 144 g CHO.     CURRENT NUTRITION ORDERS  Diet: Regular    A: chocolate  CURRENT INTAKE/TOLERANCE  No documented intakes to assess.    LABS  Nutrition-relevant labs:  Na 123, Cl 85, BUN 6.1, cre .35, alk phos 186, , , glu 918, A1C 19.1, ketone 1.51,     MEDICATIONS  Nutrition-relevant medications:  pepcid, insulin, synthroid, linzess, Creon 12 7 capsules with meals(~ 1838 units lipase/kg/meal), reglan, protonix, Kcl, carafate,     ANTHROPOMETRICS  Height: 157.5 cm (5' 2\")  Most Recent Weight: 45.7 kg (100 lb 12.8 oz)  IBW: 50  kg  % IBW: 91%  Body mass index is 18.44 kg/m ..  BMI (kg/m ): Underweight BMI < 18.5  Weight History: 4.1 kg or 8% in ~ 9 months  Wt Readings from Last 20 Encounters:   04/16/25 45.7 kg (100 lb 12.8 oz)   04/03/25 42.8 kg (94 lb 6.4 oz)   01/23/25 45.8 kg (101 lb)   04/04/23 59.2 kg (130 lb 8 oz)   03/08/23 60.3 kg (133 lb)   03/02/23 59.6 kg (131 lb 6.4 oz)   02/10/23 59.2 kg (130 lb 8 oz)   02/07/23 58.8 kg (129 lb 11.2 oz)   11/04/22 57.1 kg (125 lb 12.8 oz)   09/26/22 52.6 kg (116 lb)   04/01/21 66.9 kg (147 lb 8 oz)   11/08/20 67.5 kg (148 lb 13 oz)   11/21/19 67.2 kg (148 lb 3.2 oz)   07/02/19 63.3 kg (139 lb 9.6 oz)   06/26/19 63.5 kg (140 lb)   06/10/19 62.8 kg (138 lb 8 oz)   04/18/19 61.1 kg (134 lb 11.2 oz)   10/04/18 61.7 kg (136 lb)   09/27/18 61.7 kg (136 lb)   09/27/18 62.6 kg (138 lb)     Per care everywhere:   46.3 kg on 12/30/24  47.2 kg on 11/12/24  49.6 kg on 9/12/24  49.8 kg on 7/1/24    Dosing Weight: 45.7 kg, based on admission wt    ASSESSED NUTRITION NEEDS  Estimated Energy Needs: 9483-1035 kcals/day (30 - 35 kcals/kg)  Justification: Increased needs  Estimated Protein Needs: 55-69 grams protein/day (1.2 - 1.5 grams of pro/kg)  Justification: Increased needs  Estimated Fluid Needs: 3140-2616 mL/day (30 - 35 mL/kg)  Justification: Maintenance    SYSTEM FINDINGS    Skin/wounds: No " issues noted  GI symptoms:  LBM PTA    MALNUTRITION  % Intake:  difficult to assess  % Weight Loss: Weight loss does not meet criteria   Subcutaneous Fat Loss: Buccal: Severe  Muscle Loss: Clavicles (pectoralis and deltoids): Severe  Fluid Accumulation/Edema: Moderate to severe, 2-4+  Malnutrition Diagnosis: Severe malnutrition in the context of chronic illness  Malnutrition Present on Admission: Yes    NUTRITION DIAGNOSIS  Altered gastrointestinal (GI) function related to history of chronic pancreatitis/pancreatectomy and insulin dependent DM as evidenced by reliance on PERT and insulin.    INTERVENTIONS  Collaboration by nutrition professional with other providers  Enteral nutrition management  Manage composition of oral intake  Nutrition related medication management  Vitamin and mineral supplement therapy    Goals  Total avg nutritional intake to meet a minimum of 30 kcal/kg and 1.2 g PRO/kg daily (per dosing wt 45.7 kg).     Monitoring/Evaluation  Progress toward goals will be monitored and evaluated per policy.    Jennifer Worthy MS, RD, LD, [facility]    6C (beds 2194-2207) + 7C (beds 2948-1387) + ED + Obs  Available in [facility] by name or unit dietitian

## 2025-04-16 NOTE — PLAN OF CARE
Goal Outcome Evaluation:    PRIMARY DIAGNOSIS: HYPO/HYPERGLYCEMIA    OUTPATIENT/OBSERVATION GOALS TO BE MET BEFORE DISCHARGE  BG greater than 100 and less than 250 on two consecutive readings: Yes  Recent Labs   Lab Test 04/16/25  0319 04/16/25  0214 04/16/25  0101   * 315* 431*       Ketones absent from urine  (hyperglycemia):No    Tolerating oral intake to maintain hydration: No  Return to near baseline physical activity: In progress.    Discharge Planner Nurse   Safe discharge environment identified: No  Barriers to discharge:Yes       Entered by: Asiya Rivera RN 04/16/2025 4:13 AM    Please review provider order for any additional goals.   Nurse to notify provider when observation goals have been met and patient is ready for discharge.

## 2025-04-16 NOTE — PLAN OF CARE
Goal Outcome Evaluation:  -diagnostic tests and consults completed and resulted IN PROCESS  -vital signs normal or at patient baseline MET   Nurse to notify provider when observation goals have been met and patient is ready for discharge.

## 2025-04-16 NOTE — PLAN OF CARE
Nursing Plan of Care Note  Shift: 4906-8610 (inpatient beginning at noon)    General: A&Ox4. Able to follow commands and make needs known. Afebrile.   Pain: 10/10 abdominal pain not relieved with PRN 5 mg oxycodone and other scheduled medications.  Cardiac/Resp: VSS on RA.   /GI: Abdomen very tender. Transitioned to regular diet - no appetite. Voids spontaneously.   GJ tube site kept patent by ledesma tubing, secured to abdomen. Minimal amount of drainage on split gauze.   TF orders are placed by dietician but clarified with primary team - do not start as there is no GJ tube.  Access/infusions:   R hand PIV - infusing insulin gtt consistently @ 0.5 units/hr for all of shift with all blood glucose checks within range below 150. Contacted primary team for transition to SQ but endocrine wanted the patient to stay on gtt overnight to better understand insulin needs   L AC PIV - SL  Activity: Assist x1.   Plan: NPO at midnight for surgery tomorrow to replace GJ tube. Continue with plan of care. Notify primary team with changes.     Goal Outcome Evaluation:  Plan of Care Reviewed With: patient  Overall Patient Progress: no change

## 2025-04-16 NOTE — ED PROVIDER NOTES
[place] EMERGENCY DEPARTMENT ([facility])    4/15/25       History     Chief Complaint   Patient presents with    Abdominal Pain    Gtube Problem    Nausea & Vomiting     HPI  Chantell Kidd is a 59 year old female who with past medical history of migraines, chronic pancreatitis, post pancreatectomy diabetes, GERD, hypothyroidism, depression and kidney stone presents to the ED due to abdominal pain, G-tube problem, nausea and vomiting.  She reports that her GJ tube came out.  She is not sure how exactly fell up.  She does not think it is need anything.  She has had some ongoing abdominal pain, nausea and vomiting that does not change.  Has been doing this quite some time.  Denies any fevers.  No dysuria hematuria.    Past Medical History  Past Medical History:   Diagnosis Date    Chronic abdominal pain     Chronic pancreatitis (H)     S/P pancreatectomy    Depression with anxiety     Gastro-oesophageal reflux disease     Hypothyroidism 4/23/2015    Kidney stones     Low serum cortisol level     Migraines     Other chronic pain     STOMACH    Other chronic pain     LUMBAR SPINE    Peripheral neuropathy     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    TPIAT    Spasm of sphincter of Oddi      Past Surgical History:   Procedure Laterality Date    ARTHROPLASTY CARPOMETACARPAL (THUMB JOINT)  5/2/2014    Procedure: ARTHROPLASTY CARPOMETACARPAL (THUMB JOINT);  Surgeon: Carina Panda MD;  Location: [facility] OR    CHOLECYSTECTOMY  2004    COLONOSCOPY  7/18/2014    Procedure: COLONOSCOPY;  Surgeon: Aurora Sahu MD;  Location:  GI    COLONOSCOPY N/A 8/1/2017    Procedure: COLONOSCOPY;  Colonoscopy and upper endoscopy;  Surgeon: Deirdre Harris MD;  Location:  GI    ENDOSCOPIC RETROGRADE CHOLANGIOPANCREATOGRAM      ENDOSCOPIC RETROGRADE CHOLANGIOPANCREATOGRAM  4/19/2011    Procedure:ENDOSCOPIC RETROGRADE CHOLANGIOPANCREATOGRAM; Pancreatic Stent Placement      ENDOSCOPIC RETROGRADE  CHOLANGIOPANCREATOGRAM  5/26/2011    Procedure:ENDOSCOPIC RETROGRADE CHOLANGIOPANCREATOGRAM; with Pancreatic Stent Removal; Surgeon:DALE MIMS; Location:[facility] OR    ENDOSCOPY UPPER, COLONOSCOPY, COMBINED  4/25/2012    Procedure:COMBINED ENDOSCOPY UPPER, COLONOSCOPY; Enteroscopy with Bile Duct Stent Removal, Colonoscopy  *[facility] Safe Room*; Surgeon:GRACY GODWIN; Location:[facility] OR    ESOPHAGOSCOPY, GASTROSCOPY, DUODENOSCOPY (EGD), COMBINED  5/26/2011    Procedure:COMBINED ESOPHAGOSCOPY, GASTROSCOPY, DUODENOSCOPY (EGD); Surgeon:DALE MIMS; Location:[facility]    ESOPHAGOSCOPY, GASTROSCOPY, DUODENOSCOPY (EGD), COMBINED N/A 10/30/2014    Procedure: COMBINED ESOPHAGOSCOPY, GASTROSCOPY, DUODENOSCOPY (EGD), BIOPSY SINGLE OR MULTIPLE;  Surgeon: Sarai Moon MD;  Location: [facility] GI    ESOPHAGOSCOPY, GASTROSCOPY, DUODENOSCOPY (EGD), COMBINED Left 7/6/2015    Procedure: COMBINED ESOPHAGOSCOPY, GASTROSCOPY, DUODENOSCOPY (EGD), BIOPSY SINGLE OR MULTIPLE;  Surgeon: Thomas Estrada MD;  Location: [facility] GI    ESOPHAGOSCOPY, GASTROSCOPY, DUODENOSCOPY (EGD), COMBINED N/A 7/8/2016    Procedure: COMBINED ESOPHAGOSCOPY, GASTROSCOPY, DUODENOSCOPY (EGD), BIOPSY SINGLE OR MULTIPLE;  Surgeon: Eloy Klein MD;  Location: [facility] GI    ESOPHAGOSCOPY, GASTROSCOPY, DUODENOSCOPY (EGD), COMBINED N/A 8/4/2016    Procedure: COMBINED ESOPHAGOSCOPY, GASTROSCOPY, DUODENOSCOPY (EGD), BIOPSY SINGLE OR MULTIPLE;  Surgeon: Jason Brown MD;  Location: [facility]    ESOPHAGOSCOPY, GASTROSCOPY, DUODENOSCOPY (EGD), COMBINED N/A 8/1/2017    Procedure: COMBINED ESOPHAGOSCOPY, GASTROSCOPY, DUODENOSCOPY (EGD);;  Surgeon: Deirdre Harris MD;  Location: [facility] GI    ESOPHAGOSCOPY, GASTROSCOPY, DUODENOSCOPY (EGD), COMBINED N/A 6/12/2019    Procedure: ESOPHAGOGASTRODUODENOSCOPY (EGD);  Surgeon: Jose Francisco Perdomo MD;  Location:  GI    GYN SURGERY      Hysterectomy and O    [facility] ENDOSCOPY W EUS  7/20/2011     Procedure:COMBINED ENDOSCOPIC ULTRASOUND, ESOPHAGOSCOPY, GASTROSCOPY, DUODENOSCOPY (EGD); Surgeon:DARVIN DONOHUE; Location:[facility] GI    HERNIORRHAPHY VENTRAL N/A 9/15/2016    Procedure: HERNIORRHAPHY VENTRAL;  Surgeon: Juanita Bernabe MD;  Location: [facility] OR    HYSTERECTOMY  1997 or 1998    USO    INCISION AND DRAINAGE ABDOMEN WASHOUT, COMBINED  8/16/2012    Procedure: COMBINED INCISION AND DRAINAGE ABDOMEN WASHOUT;  ,debridement and Drainage Post Appendectomy;  Surgeon: Ron Austin MD;  Location: [facility] OR    INJECT TRANSVERSUS ABDOMINIS PLANE (TAP) BLOCK BILATERAL Bilateral 5/26/2016    Procedure: INJECT TRANSVERSUS ABDOMINIS PLANE (TAP) BLOCK BILATERAL;  Surgeon: Leonard Mccallum MD;  Location: [facility] OR    IR NG TUBE PLACEMENT REQ RAD & FLUORO  12/4/2017    LAPAROSCOPIC APPENDECTOMY  7/30/2012    Procedure: LAPAROSCOPIC APPENDECTOMY;  Open Appendectomy;  Surgeon: Ron Austin MD;  Location: [facility] OR    PANCREATECTOMY, TRANSPLANT AUTO ISLET CELL, COMBINED  1/6/2012    Procedure:COMBINED PANCREATECTOMY, TRANSPLANT AUTO ISLET CELL; Total  Pancreatectomy, Auto Islet Transplant, splenectomy, 18fr. transgastric-jejunal feeding tube placement, liver biopsy; Surgeon:PALAK LEE; Location:[facility] OR    REPLACE GASTROSTOMY TUBE, PERCUTANEOUS N/A 8/30/2017    Procedure: REPLACE GASTROSTOMY TUBE, PERCUTANEOUS;  GJ Tube Change;  Surgeon: Jose Nath PA-C;  [facility]: [facility] OR    SPLENECTOMY       acetaminophen (TYLENOL) 325 MG tablet  Acetone, Urine, Test (KETONE TEST) STRP  alendronate (FOSAMAX) 70 MG tablet  alum & mag hydroxide-simethicone (MYLANTA/MAALOX) 200-200-25 MG CHEW chewable tablet  amylase-lipase-protease (CREON 12) 77984 units CPEP  Continuous Blood Gluc  (DEXCOM G6 ) RICARDO  Continuous Blood Gluc Sensor (DEXCOM G6 SENSOR) MISC  Continuous Blood Gluc Transmit (DEXCOM G6 TRANSMITTER) MISC  Continuous Glucose  (DEXCOM G7 ) RICARDO  Continuous Glucose Sensor  (DEXCOM G7 SENSOR) MISC  CONTOUR NEXT TEST test strip  cyclobenzaprine (FLEXERIL) 10 MG tablet  cyclobenzaprine (FLEXERIL) 5 MG tablet  diclofenac (FLECTOR) 1.3 % Patch  diclofenac (VOLTAREN) 1 % GEL  dicyclomine (BENTYL) 10 MG capsule  dronabinol (MARINOL) 2.5 MG capsule  DULoxetine (CYMBALTA) 30 MG capsule  DULoxetine (CYMBALTA) 60 MG EC capsule  famotidine (PEPCID) 20 MG tablet  Glucagon (GVOKE HYPOPEN 2-PACK) 1 MG/0.2ML pen  hydrocortisone (CORTAID) 1 % external cream  hydrocortisone (CORTEF) 10 MG tablet  hydrocortisone (CORTEF) 5 MG tablet  hydrocortisone sodium succinate PF (SOLU-CORTEF) 100 MG injection  Injection Device for insulin (NOVOPEN ECHO) RICARDO  insulin aspart (NOVOLOG VIAL) 100 UNITS/ML vial  insulin aspart (NOVOPEN ECHO) 100 UNIT/ML cartridge  insulin aspart (NOVOPEN ECHO) 100 UNIT/ML cartridge  Insulin Disposable Pump (OMNIPOD 5 G6 INTRO, GEN 5,) KIT  insulin glargine (LANTUS PEN) 100 UNIT/ML pen  insulin pen needle (PIP PEN NEEDLES 32G X 4MM) 32G X 4 MM miscellaneous  levothyroxine (SYNTHROID/LEVOTHROID) 112 MCG tablet  linaclotide (LINZESS) 145 MCG capsule  lipase-protease-amylase (CREON) 36798-02041-10785 units CPEP  metoclopramide (REGLAN) 5 MG tablet  Nutritional Supplements (BOOST HIGH PROTEIN) LIQD  ondansetron (ZOFRAN) 4 MG tablet  ondansetron (ZOFRAN-ODT) 4 MG ODT tab  order for DME  pantoprazole (PROTONIX) 40 MG EC tablet  polyethylene glycol (MIRALAX/GLYCOLAX) packet  potassium chloride ER (KLOR-CON M) 20 MEQ CR tablet  senna-docusate (SENOKOT-S/PERICOLACE) 8.6-50 MG tablet  sodium chloride (OCEAN) 0.65 % nasal spray  sucralfate (CARAFATE) 1 GM tablet  SUMAtriptan (IMITREX) 50 MG tablet  topiramate (TOPAMAX) 100 MG tablet  traMADol (ULTRAM) 50 MG tablet  traZODone (DESYREL) 100 MG tablet      Allergies   Allergen Reactions    Corticosteroids Other (See Comments)     All oral, IV and injectable steroids are contraindicated (unless in life threatening situations) in Islet Auto transplant  recipients. They can cause irreversible loss of islet cell function. Please contact patient's transplant care coordinator YURI Cortez RN at [phone]/pager [phone] and/or endocrinologist prior to administration.      Chocolate Flavoring Agent (Non-Screening) Rash     Breaks out when eats chocolate     Family History  Family History   Problem Relation Age of Onset    Hypertension Mother     Diabetes Mother     Osteoporosis Mother     Cancer Father         pancreatic cancer    Diabetes Maternal Grandmother     Cardiovascular Maternal Grandmother     Cancer Maternal Grandfather         lung cancer    Cancer Sister         brain    Cancer Sister         liver cancer     Social History   Social History     Tobacco Use    Smoking status: Never    Smokeless tobacco: Never   Vaping Use    Vaping status: Never Used   Substance Use Topics    Alcohol use: No     Alcohol/week: 0.0 standard drinks of alcohol    Drug use: No      Past medical history, past surgical history, medications, allergies, family history, and social history were reviewed with the patient. No additional pertinent items.   A complete review of systems was performed with pertinent positives and negatives noted in the HPI, and all other systems negative.    Physical Exam      Physical Exam  Physical Exam   Constitutional: oriented to person, place, and time. appears well-developed and well-nourished.   HENT:   Head: Normocephalic and atraumatic.   Neck: Normal range of motion.   Pulmonary/Chest: Effort normal. No respiratory distress.   Cardiac: No murmurs, rubs, gallops. RRR.  Abdominal: Abdomen soft, GJ tube site without erythema or drainage diffuse abdominal tenderness. No rebound tenderness.  MSK: Long bones without deformity or evidence of trauma  Neurological: alert and oriented to person, place, and time.   Skin: Skin is warm and dry.   Psychiatric:  normal mood and affect.  behavior is normal. Thought content normal.       ED Course,  Procedures, & Data      Procedures          No results found for any visits on 04/15/25.  Medications - No data to display  Labs Ordered and Resulted from Time of ED Arrival to Time of ED Departure - No data to display  No orders to display          Critical Care Addendum  My initial assessment, based on my review of vital signs, focused history, and physical exam, established a high suspicion that Chantell Kidd has  hypoglycemia requiring insulin drip , which requires immediate intervention, and therefore she is critically ill.     After the initial assessment, the care team initiated IV fluid administration and consulted with hospitalist  to provide stabilization care. Due to the critical nature of this patient, I reassessed vital signs and physical exam multiple times prior to her disposition.     Time also spent performing documentation, reviewing test results, discussion with consultants, and coordination of care.     Critical care time (excluding teaching time and procedures): 30 minutes.       Assessment & Plan    Patient presenting with GJ tube that had fallen out.  I did place a Mckee in the site to keep the site open.  Patient otherwise appears well.  She has chronic stable pain so do not feel imaging is necessary.  Will admit to medicine.  She was found to be significantly hyperglycemic.  No evidence of obvious infection, cardiac ischemia.  Likely due to medication noncompliance.  She is not in DKA.  Insulin drip started.  Her potassium was replaced.    I have reviewed the nursing notes. I have reviewed the findings, diagnosis, plan and need for follow up with the patient.    New Prescriptions    No medications on file       Final diagnoses:   Encounter for feeding tube placement   Hyperglycemia       Mk Graff  [facility] EMERGENCY DEPARTMENT  4/15/2025     Mk Graff MD  04/15/25 [id number]

## 2025-04-16 NOTE — CONSULTS
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 Advanced endoscopy consulted for replacement of PEGJ.    #PEGJ tube displacement   #Abdominal pain   #History of TPAIT (2012)  Presents with displacement of her PEGJ tube 4/15. She states this was placed in [place] in 2024 and she was tolerating feeds up until January. 1/28/2025, CT noted that the percutaneous gastrojejunostomy tube was coiled in the proximal stomach with the tip in the distal stomach. Since this time she has had increased abdominal bloating and pain with feeds. She reports she does vent her G tube a few times/week when she feels nauseous. Unsure if pt was aware that the J port was coiled in her stomach.     Patient had a PEGJ placed post TPAIT in 2012 that she had for a few years. IR has not offered exchange of this GJ tube since 2017 given inability to sedate without anesthesia and anatomy that makes exchange technically challenging with tube that continues to flip back in to the stomach.     #Severe malnutrition in the context of chronic illness   Patient reports > 30 lb weight loss in the last 6 months. Her peak weight over the last several years was 153 pounds in 2020.  Pt began to have decline in PO intakes 1 year ago. With her inability to maintain weight and intolerance to PO intakes (n/v/bloating), she had a PEGJ placed in [place] in 2024 and tube feeds were restarted. No enzymes were ordered with the tube feeds. She has continued to lose weight since November. She does eat some liquids at home and  "reports oily diarrhea.   - RD following  - Plan to restart TF once new PEGJ in place.     Recommendations:  -- PEGJ exchange 4/17 with Dr. Perdomo vs separate PEG and PEJ   - pt is not on anticoagulation, Hgb and plts stable   -- NPO at midnight  -- Analgesia/Antiemetics per primary team  -- Recommend bowel regimen once diet advances         Thank you for involving us in this patient's care. Please do not hesitate to contact the GI service with any questions or concerns.     Pt seen and care plan discussed with Dr. Perdomo, GI staff physician.    Overall time spent on the date of this encounter preparing to see the patient (including chart review of available notes, clinical status events, imaging and labs); obtaining and/or reviewing separately obtained history; ordering medications, tests or procedures; communicating with other health care professionals; and documenting the above clinical information in the electronic medical record was 60 minutes.      Ne Simons PA-C, TITI  Inpatient Gastroenterology Service  [facility]  -------------------------------------------------------------------------------------------------------------------       Reason for Consultation:   GJ tube dislodgement; IR unable to replace due to complexity and need for general anesthesia          History of Present Illness:   Chantell Kidd is a 59 year old female with a PMH significant for insulin-dependent diabetes mellitus after pancreatectomy, chronic pancreatitis, migraine, hypothyroidism, and G tube nutrition who presented to the ED with a displaced  tube and was admitted for IR replacement of the G tube.     Patient seen and examined at 3:30. History is obtained from patient. She reports that yesterday she had increased abdominal pain and bloating and then she just a heard a \"pop\" and the tube had fallen out. She reports she was tolerating TF from November (when this tube was placed until January, when she " reports increase in nausea and bloating with feeds. She reports that she vents her G tube a few times/ week and does not see anything come out, although she does continuous 24/7 tube feeds. She endorses oily stools and significant weight loss. She states this all began about 1 year ago when she began to not be able to tolerate PO intakes and was unable to maintain her weight.     Previous Procedures:  UPPER GI ENDOSCOPY (06/12/2019 9:56 AM)   UPPER GI ENDOSCOPY (08/01/2017 8:01 AM)             Past Medical History:   Reviewed and edited as appropriate  Past Medical History:   Diagnosis Date    Chronic abdominal pain     Chronic pancreatitis (H)     S/P pancreatectomy    Depression with anxiety     Gastro-oesophageal reflux disease     Hypothyroidism 4/23/2015    Kidney stones     Low serum cortisol level     Migraines     Other chronic pain     STOMACH    Other chronic pain     LUMBAR SPINE    Peripheral neuropathy     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    TPIAT    Spasm of sphincter of Oddi             Past Surgical History:   Reviewed and edited as appropriate   Past Surgical History:   Procedure Laterality Date    ARTHROPLASTY CARPOMETACARPAL (THUMB JOINT)  5/2/2014    Procedure: ARTHROPLASTY CARPOMETACARPAL (THUMB JOINT);  Surgeon: Carina Panda MD;  Location: [facility] OR    CHOLECYSTECTOMY  2004    COLONOSCOPY  7/18/2014    Procedure: COLONOSCOPY;  Surgeon: Aurora Sahu MD;  [facility]: [facility]    COLONOSCOPY N/A 8/1/2017    Procedure: COLONOSCOPY;  Colonoscopy and upper endoscopy;  Surgeon: Deirdre Harris MD;  [facility]: [facility]    ENDOSCOPIC RETROGRADE CHOLANGIOPANCREATOGRAM      ENDOSCOPIC RETROGRADE CHOLANGIOPANCREATOGRAM  4/19/2011    Procedure:ENDOSCOPIC RETROGRADE CHOLANGIOPANCREATOGRAM; Pancreatic Stent Placement      ENDOSCOPIC RETROGRADE CHOLANGIOPANCREATOGRAM  5/26/2011    Procedure:ENDOSCOPIC RETROGRADE CHOLANGIOPANCREATOGRAM; with Pancreatic Stent Removal;  Surgeon:DALE MIMS; Location:[facility]    ENDOSCOPY UPPER, COLONOSCOPY, COMBINED  4/25/2012    Procedure:COMBINED ENDOSCOPY UPPER, COLONOSCOPY; Enteroscopy with Bile Duct Stent Removal, Colonoscopy  *[facility] Safe Room*; Surgeon:GRACY GODWIN; Location[facility]    ESOPHAGOSCOPY, GASTROSCOPY, DUODENOSCOPY (EGD), COMBINED  5/26/2011    Procedure:COMBINED ESOPHAGOSCOPY, GASTROSCOPY, DUODENOSCOPY (EGD); Surgeon:DALE MIMS; [facility]    ESOPHAGOSCOPY, GASTROSCOPY, DUODENOSCOPY (EGD), COMBINED N/A 10/30/2014    Procedure: COMBINED ESOPHAGOSCOPY, GASTROSCOPY, DUODENOSCOPY (EGD), BIOPSY SINGLE OR MULTIPLE;  Surgeon: Sarai Moon MD;  Location:  GI    ESOPHAGOSCOPY, GASTROSCOPY, DUODENOSCOPY (EGD), COMBINED Left 7/6/2015    Procedure: COMBINED ESOPHAGOSCOPY, GASTROSCOPY, DUODENOSCOPY (EGD), BIOPSY SINGLE OR MULTIPLE;  Surgeon: Thomas Estrada MD;  Location:  GI    ESOPHAGOSCOPY, GASTROSCOPY, DUODENOSCOPY (EGD), COMBINED N/A 7/8/2016    Procedure: COMBINED ESOPHAGOSCOPY, GASTROSCOPY, DUODENOSCOPY (EGD), BIOPSY SINGLE OR MULTIPLE;  Surgeon: Eloy Klein MD;  Location:  GI    ESOPHAGOSCOPY, GASTROSCOPY, DUODENOSCOPY (EGD), COMBINED N/A 8/4/2016    Procedure: COMBINED ESOPHAGOSCOPY, GASTROSCOPY, DUODENOSCOPY (EGD), BIOPSY SINGLE OR MULTIPLE;  Surgeon: Jason Brown MD;  Location:  GI    ESOPHAGOSCOPY, GASTROSCOPY, DUODENOSCOPY (EGD), COMBINED N/A 8/1/2017    Procedure: COMBINED ESOPHAGOSCOPY, GASTROSCOPY, DUODENOSCOPY (EGD);;  Surgeon: Deirdre Harris MD;  Location:  GI    ESOPHAGOSCOPY, GASTROSCOPY, DUODENOSCOPY (EGD), COMBINED N/A 6/12/2019    Procedure: ESOPHAGOGASTRODUODENOSCOPY (EGD);  Surgeon: Jose Francisco Perdomo MD;  [facility]    GYN SURGERY      Hysterectomy and USO    [facility] ENDOSCOPY W EUS  7/20/2011    Procedure:COMBINED ENDOSCOPIC ULTRASOUND, ESOPHAGOSCOPY, GASTROSCOPY, DUODENOSCOPY (EGD); Surgeon:DARVIN DONOHUE;  [facility]    HERNIORRHAPHY VENTRAL N/A 9/15/2016    Procedure: HERNIORRHAPHY VENTRAL;  Surgeon: Juanita Bernabe MD;  [facility]    HYSTERECTOMY  1997 or 1998    USO    INCISION AND DRAINAGE ABDOMEN WASHOUT, COMBINED  8/16/2012    Procedure: COMBINED INCISION AND DRAINAGE ABDOMEN WASHOUT;  ,debridement and Drainage Post Appendectomy;  Surgeon: Ron Austin MD;  [facility]    INJECT TRANSVERSUS ABDOMINIS PLANE (TAP) BLOCK BILATERAL Bilateral 5/26/2016    Procedure: INJECT TRANSVERSUS ABDOMINIS PLANE (TAP) BLOCK BILATERAL;  Surgeon: Leonard Mccallum MD;  [facility]    IR NG TUBE PLACEMENT REQ RAD & FLUORO  12/4/2017    LAPAROSCOPIC APPENDECTOMY  7/30/2012    Procedure: LAPAROSCOPIC APPENDECTOMY;  Open Appendectomy;  Surgeon: Ron Austin MD;  [facility]    PANCREATECTOMY, TRANSPLANT AUTO ISLET CELL, COMBINED  1/6/2012    Procedure:COMBINED PANCREATECTOMY, TRANSPLANT AUTO ISLET CELL; Total  Pancreatectomy, Auto Islet Transplant, splenectomy, 18fr. transgastric-jejunal feeding tube placement, liver biopsy; Surgeon:PALAK LEE; [facility]    REPLACE GASTROSTOMY TUBE, PERCUTANEOUS N/A 8/30/2017    Procedure: REPLACE GASTROSTOMY TUBE, PERCUTANEOUS;  GJ Tube Change;  Surgeon: Jose Nath PA-C;  [facility]    SPLENECTOMY                Social History:     Social Drivers of Health with Concerns     Depression: At risk (1/23/2025)    PHQ-2     PHQ-2 Score: 6   Housing Stability: Unknown (3/15/2024)    Received from [facility] and [facility] Practices    Housing Stability Vital Sign     Unable to Pay for Housing in the Last Year: No     Number of Places Lived in the Last Year: Not on file     Unstable Housing in the Last Year: No   Alcohol Use: Not on file   Physical Activity: Not on file   Stress: Not on file   Social Connections: Not on file   Health Literacy: Not on file            Family History:   Patient's family history is  reviewed today and is non-contributory  Family History   Problem Relation Age of Onset    Hypertension Mother     Diabetes Mother     Osteoporosis Mother     Cancer Father         pancreatic cancer    Diabetes Maternal Grandmother     Cardiovascular Maternal Grandmother     Cancer Maternal Grandfather         lung cancer    Cancer Sister         brain    Cancer Sister         liver cancer             Allergies:   Reviewed and edited as appropriate     Allergies   Allergen Reactions    Corticosteroids Other (See Comments)     All oral, IV and injectable steroids are contraindicated (unless in life threatening situations) in Islet Auto transplant recipients. They can cause irreversible loss of islet cell function. Please contact patient's transplant care coordinator YURI Cortez RN at [phone]/pager [phone] and/or endocrinologist prior to administration.      Chocolate Flavoring Agent (Non-Screening) Rash     Breaks out when eats chocolate            Medications:     Current Facility-Administered Medications   Medication Dose Route Frequency Provider Last Rate Last Admin    acetaminophen (TYLENOL) tablet 975 mg  975 mg Oral Q8H Jahaira Ivey NP   975 mg at 04/16/25 1542    alum & mag hydroxide-simethicone (MAALOX) 200-200-25 MG chewable tablet 1 tablet  1 tablet Oral TID PRN Jasvir Higgins MD        cyclobenzaprine (FLEXERIL) tablet 5 mg  5 mg Oral At Bedtime PRN Jasvir Higgins MD        cyclobenzaprine (FLEXERIL) tablet 5 mg  5 mg Oral TID PRN Jasvir Higgins MD        dextrose 10% infusion   Intravenous Continuous PRN Jahaira Ivey NP        dextrose 10% infusion   Intravenous Continuous PRN Jasvir Higgins MD        glucose gel 15-30 g  15-30 g Oral Q15 Min PRN Jasvir Higgins MD        Or    dextrose 50 % injection 25-50 mL  25-50 mL Intravenous Q15 Min PRN Jasvir Higgins MD        Or    glucagon injection 1 mg  1 mg Subcutaneous Q15 Min PRN Jasvir Higgins MD        dicyclomine  (BENTYL) capsule 10 mg  10 mg Oral Q6H Jasvir Higgins MD   10 mg at 04/16/25 1226    droNABinol (MARINOL) capsule 5 mg  5 mg Oral BID PRN Jasvir Higgins MD        DULoxetine (CYMBALTA) DR capsule 90 mg  90 mg Oral Daily Jasvir Higgins MD   90 mg at 04/16/25 0836    famotidine (PEPCID) tablet 20 mg  20 mg Oral At Bedtime Jasvir Higgins MD        gabapentin (NEURONTIN) capsule 100 mg  100 mg Oral TID Jahaira Ivey NP   100 mg at 04/16/25 1542    hydrocortisone (CORTEF) tablet 10 mg  10 mg Oral Daily Jasvir Higgins MD   10 mg at 04/16/25 0836    hydrocortisone (CORTEF) tablet 15 mg  15 mg Oral At Bedtime Jasvir Higgins MD        insulin aspart (NovoLOG) injection (RAPID ACTING)   Subcutaneous TID w/meals Earlene Samuel PA        insulin aspart (NovoLOG) injection (RAPID ACTING)   Subcutaneous With Snacks or Supplements Earlene Samuel PA        [Held by provider] insulin glargine (LANTUS PEN) injection 12 Units  12 Units Subcutaneous At Bedtime Jasvir Higgins MD        insulin regular (MYXREDLIN) 1 unit/mL infusion  0-24 Units/hr Intravenous Continuous Jasvir Higgins MD 0.5 mL/hr at 04/16/25 1235 0.5 Units/hr at 04/16/25 1235    levothyroxine (SYNTHROID/LEVOTHROID) tablet 112 mcg  112 mcg Oral Daily Jasvir Higgins MD   112 mcg at 04/16/25 0837    [START ON 4/17/2025] Lidocaine (LIDOCARE) 4 % Patch 3 patch  3 patch Transdermal Q24H Jahaira Ivey, SANTOS        linaclotide (LINZESS) capsule 145 mcg  145 mcg Oral QAM AC Jasvir Higgins MD   145 mcg at 04/16/25 1226    lipase-protease-amylase (CREON 12) 99943-67[address]-88131 units per capsule 4 capsule  4 capsule Oral With Snacks or Supplements Jasvir Higgins MD        lipase-protease-amylase (CREON 12) 12946-24612-98781 units per capsule 7 capsule  7 capsule Oral TID w/meals Jasvir Higgins MD        menthol (ICY HOT) 5 % patch 1 patch  1 patch Topical Q8H PRN Jahaira Ivey NP        metoclopramide (REGLAN) tablet 10 mg  10 mg Oral TID Ronaldo  MD Jasvir   10 mg at 04/16/25 1542    multivitamins w/minerals liquid 15 mL  15 mL Per Feeding Tube Daily Jahaira Ivey NP   15 mL at 04/16/25 1542    naloxone (NARCAN) injection 0.2 mg  0.2 mg Intravenous Q2 Min PRN Melissa Christine MD        Or    naloxone (NARCAN) injection 0.4 mg  0.4 mg Intravenous Q2 Min PRN Melissa Christine MD        Or    naloxone (NARCAN) injection 0.2 mg  0.2 mg Intramuscular Q2 Min PRN Melissa Christine MD        Or    naloxone (NARCAN) injection 0.4 mg  0.4 mg Intramuscular Q2 Min PRN Melissa Christine MD        ondansetron (ZOFRAN ODT) ODT tab 4 mg  4 mg Oral Q6H PRN Jasvir Higgins MD        oxyCODONE (ROXICODONE) tablet 5 mg  5 mg Oral Q4H PRN Jahaira Ivey NP        pantoprazole (PROTONIX) EC tablet 40 mg  40 mg Oral BID Jasvir Higgins MD   40 mg at 04/16/25 0836    polyethylene glycol (MIRALAX) Packet 17 g  17 g Oral BID PRN Jasvir Higgins MD        [START ON 4/17/2025] polyethylene glycol (MIRALAX) Packet 17 g  17 g Oral Daily Jahaira Ivey NP        potassium chloride minerva ER (KLOR-CON M10) CR tablet 20 mEq  20 mEq Oral Daily Jasvir Higgins MD   20 mEq at 04/16/25 0837    prochlorperazine (COMPAZINE) injection 10 mg  10 mg Intravenous Q6H PRN Jasvir Higgins MD        Or    prochlorperazine (COMPAZINE) tablet 10 mg  10 mg Oral Q6H PRN Jasvir Higgins MD        senna-docusate (SENOKOT-S/PERICOLACE) 8.6-50 MG per tablet 1 tablet  1 tablet Oral BID PRN Jasvir Higgins MD        Or    senna-docusate (SENOKOT-S/PERICOLACE) 8.6-50 MG per tablet 2 tablet  2 tablet Oral BID PRN Jasvir Higgins MD        sodium chloride (OCEAN) 0.65 % nasal spray 1 spray  1 spray Both Nostrils Daily PRN Jasvir Higgins MD        sucralfate (CARAFATE) tablet 1 g  1 g Oral 4x Daily AC & HS Jasvir Higgins MD   1 g at 04/16/25 1543    SUMAtriptan (IMITREX) tablet 50 mg  50 mg Oral at onset of headache Jasvir Higgins MD        thiamine (B-1) tablet 100 mg   100 mg Oral or Feeding Tube Daily Jahaira Ivey NP   100 mg at 04/16/25 1542    topiramate (TOPAMAX) tablet 100 mg  100 mg Oral BID Jasvir Higgins MD   100 mg at 04/16/25 0837    traZODone (DESYREL) tablet 100 mg  100 mg Oral At Bedtime Jasvir Higgins MD                 Review of Systems:     A complete review of systems was performed and is negative except as noted in the HPI           Physical Exam:   Temp: 98.3  F (36.8  C) Temp src: Oral BP: 122/81 Pulse: 79   Resp: 16 SpO2: 98 % O2 Device: None (Room air)    Wt:   Wt Readings from Last 2 Encounters:   04/16/25 45.7 kg (100 lb 12.8 oz)   04/03/25 42.8 kg (94 lb 6.4 oz)        General: cachectic female in NAD.  Answers appropriately.    HEENT: Head is AT/NC. Sclera anicteric. No conjunctival injection.    Lungs: No increased work of breathing, speaking in full sentences, equal chest rise. On Room Air.   Heart: Regular rate. Peripheral perfusion intact.  Abdomen: Soft, tender to palpation, distended. No redness noted around PEGJ site, ledesma in place to stent open, some white/yellow drainage noted, TF? voluntary guarding present  Extremities: WWP, no pedal edema.  MSK: no gross deformity, severe muscle wasting  Skin: No jaundice or rash  Neurologic: Grossly non-focal.  CN 2-12 grossly intact.   Psych: mood appropriate to situation           Data:   Labs and imaging below were independently reviewed and interpreted    LAB WORK:    BMP  Recent Labs   Lab 04/16/25  1431 04/16/25  1232 04/16/25  1137 04/16/25  1030 04/15/25  2354 04/15/25  2130   NA  --   --   --   --   --  123*   POTASSIUM  --   --   --   --   --  3.6   CHLORIDE  --   --   --   --   --  85*   ELIZA  --   --   --   --   --  8.9   CO2  --   --   --   --   --  23   BUN  --   --   --   --   --  6.1*   CR  --   --   --   --   --  0.35*   * 128* 125* 117*   < > 918*    < > = values in this interval not displayed.     CBC  Recent Labs   Lab 04/15/25  2130   WBC 10.0   RBC 3.81   HGB 11.9   HCT  33.4*   MCV 88   MCH 31.2   MCHC 35.6   RDW 11.9        INRNo lab results found in last 7 days.  LFTs  Recent Labs   Lab 04/15/25  2130   ALKPHOS 186*   *   *   BILITOTAL 0.3   PROTTOTAL 6.2*   ALBUMIN 3.9      PANC  Recent Labs   Lab 04/15/25  2130   LIPASE 7*       IMAGING:  (Personally reviewed)  CT Abdomen Pelvis w Contrast (04/16/2025 12:57 AM)   CT Chest/Abdomen/Pelvis w Contrast (01/28/2025 2:10 PM)       =======================================================================

## 2025-04-16 NOTE — CONSULTS
IR consult for GJ tube replacement via old tract    Recommend discussion with GI vs surgery. IR has not offered exchange of this GJ tube since 2017 given inability to sedate without anesthesia and anatomy that makes exchange technically challenging with tube that continues to flip back in to the stomach.    Recommendations sent to Jahaira Ivey NP after review with Dr. Sahu from IR.    Nanci Dinero, KAITY, APRN  Interventional Radiology   IR on-call pager: [phone]

## 2025-04-16 NOTE — PROGRESS NOTES
Brief Internal Medicine Progress Note:    This patient was seen by my colleague on the same calendar day. This is a non-billable note.     Updates Today:  - follow IR recs -> unable to replace GJ tube d/t need for general anesthesia and challenging exchange   - consult general surgery for GJ replacement   - follow pain team recs -> scheduled tylenol, flexeril PRN, oxycodone PRN, consider gabapentin, lidocaine and menthol patches   - follow endocrine recs -> continue insulin drip, start carb coverage  - consult GI luminal for GJ replacement   - regular diet   - SW/RNCC consult    PEG tube displacement   Bloating  Patient reports that her G tube became dislodged approximately one day prior to admission with an audible pop. She notes distention of her abdomen with bloating and pain. IR was consulted for GJ replacement however their team is unable to replace GJ tube d/t need for general anesthesia and challenging exchange.   - general surgery consulted, appreciate assistance   - GI luminal consulted, appreciate assistance     Post-pancreatectomy diabetes (type 1)  TPAIT (2012)  She was previously seen outpatient by both endocrinology and PCP. Her A1c on 1/23 was extremely high at 17.1, repeat A1c 19.1% on 4/15/25. Her glucose values have ranged in the 400s to 900s outpatient. She was seen by endocrinology, who report that it is unlikely she is taking her dose of insulin at this A1c and recommended exchange for new insulin. On admission, glucose was in the 500s, though patient had no signs of DKA. Per assessment of her endocrinologist, this is likely part of the cause of her malnutrition. Started on insulin gtt.   - endocrinology consulted, appreciate assistance   - continue insulin drip   - novolog CHO coverage: 1 unit per 20g CHO   - hold PTA insulin pump and CGMS while on insulin gtt  - continue PTA Creon     Acute on chronic pain  Patient reports severe pain in her abdomen and back. She reports that she has  previously taken methadone for this, but no record of methadone later than 2010 could be located in her chart. CT A/P on admission unremarkable. Large amount of discrepancy regarding what her pain regimen was prior to admission, I.e. shared that she was taking methadone 20 mg 4x/day PRN for pain as well as oxycodone? National  for the past year does show prescriptions for methadone or oxycodone at all or consistently.   - pain team consulted, appreciate assistance (signed off 4/16)    - acetaminophen 975 mg PO q8h   - flexeril 5 mg PO TID PRN   - oxycodone 5 mg PO q4h PRN while inpatient while workup ongoing     - please provide very short tapering script upon discharge    - consider gabapentin 100 mg TID    - lidocaine patches 1-3 patches    - menthol patches, 1 patch q8h    - bowel regimen   - pharmacy consulted for med rec, appreciate assistance     Malnutrition, severe  Cachexia   Patient reports > 30 lb weight loss in the last 6 months. Per endocrinology notes, this is likely multifactorial due to GI issues and uncontrolled diabetes, chronic abdominal pain   - nutrition consulted, appreciate assistance   - continue PTA tube feeds     Hx of adrenal insufficiency   Followed by Dr. Maher. Previously suppressed AM cortisol and has been on empiric therapy for possible adrenal insufficiency, unclear if central vs transient. Most recent clinic note describes inability to wean steroids due to hypoglycemia episodes.   - continue PTA hydrocortisone     Concern for malignancy  Elevated CEA   Patient was seen and evaluated by PCP with a stat CTCAP to assess for malignancy on 1/23. This scan was negative. She was scheduled for follow up with her PCP but has yet to follow up with her PCP. She notes elevated CEA since she was in [place] but has not had a workup showing any cancer at this point however tells me that she is working with her PCP on next steps.   - continue follow-up with PCP     Discharge planning:  - place  referral to [facility] pain clinic   - follow up with PCP regarding elevated CEA   - [facility] Endocrinology currently scheduled for 5/13/25 and 8/21/25      Jahaira Ivey DNP, ACN-  Internal Medicine [facility]ID Hospitalist  [facility]

## 2025-04-16 NOTE — ED TRIAGE NOTES
Pt BIBA from home for evaluation of abdominal pain and G-J tube problem. As per report, around 7pm, pt was laying down when out of a sudden, G-J tube fell off. Pt complaining of severe abdominal pain. Medics gave her total of 2mg Dilaudid. Last dose was around 2034. Medics gave 4mg of Zofran as well. BG was high. Hx of T1DM

## 2025-04-16 NOTE — CONFIDENTIAL NOTE
I see this patient still hasn't established, despite medical acuity and concerns voiced to her previously. She has cancelled and no-showed several times.  She has another appt coming with Dr. Knight. He and his preceptor (TBANGELA) may be willing to develop a plan for care and refills of this and other meds.  Based on my limited exposure to this patient, I am reluctant to do care without visits. Declined the cyclobenzaprine.    I see she just checked into the ED.

## 2025-04-16 NOTE — CONSULTS
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 Inpatient Diabetes Service consulted for glycemic management recommendations.     History obtained via the patient, chart review and discussion with primary team.       Additional Historian:  none    Patient is remotely known to the Inpatient Diabetes Service from past admission(s).    Last dose insulin PTA:   - Lantus AM 18 units, 12 units PM (last taken 4/14)  - Novolog ~3pm 4/15   Current inpatient regimen:    - IV insulin drip (non-DKA protocol)    BG at time of consult: 111 mg/dL  Planned Procedures/Surgeries: feeding tube replacement    Relevant Labs on Admission:  if contributory, otherwise see labs below   Latest Reference Range & Units 04/15/25 21:30 04/15/25 22:45   Sodium 135 - 145 mmol/L 123 (L)    Potassium 3.4 - 5.3 mmol/L 3.6    Chloride 98 - 107 mmol/L 85 (L)    Carbon Dioxide (CO2) 22 - 29 mmol/L 23    Urea Nitrogen 8.0 - 23.0 mg/dL 6.1 (L)    Creatinine 0.51 - 0.95 mg/dL 0.35 (L)    GFR Estimate >60 mL/min/1.73m2 >90    Calcium 8.8 - 10.4 mg/dL 8.9    Anion Gap 7 - 15 mmol/L 15    Albumin 3.5 - 5.2 g/dL 3.9    Protein Total 6.4 - 8.3 g/dL 6.2 (L)    Alkaline Phosphatase 40 - 150 U/L 186 (H)    ALT 0 - 50 U/L 140 (H)    AST 0 - 45 U/L 213 (H)    Bilirubin Total <=1.2 mg/dL 0.3  " "  Glucose 70 - 99 mg/dL 918 (HH)    Estimated Average Glucose <117 mg/dL 501 (H)    Hemoglobin A1C <5.7 % 19.1 (H)    Ketone Quantitative <=0.30 mmol/L 1.51 (HH)    Lactic Acid 0.7 - 2.0 mmol/L 1.1    Lipase 13 - 60 U/L 7 (L)    FIO2   21   Ph Venous 7.32 - 7.43   7.43   PCO2 Venous 40 - 50 mm Hg  48   PO2 Venous 25 - 47 mm Hg  66 (H)   O2 Sat, Venous 70.0 - 75.0 %  93.6 (H)   Bicarbonate Venous 21 - 28 mmol/L  32 (H)   Base Excess Venous -3.0 - 3.0 mmol/L  6.6 (H)   Oxyhemoglobin Venous 70 - 75 %  92 (H)   WBC 4.0 - 11.0 10e3/uL 10.0    Hemoglobin 11.7 - 15.7 g/dL 11.9    Hematocrit 35.0 - 47.0 % 33.4 (L)    Platelet Count 150 - 450 10e3/uL 330    RBC Count 3.80 - 5.20 10e6/uL 3.81    MCV 78 - 100 fL 88    MCH 26.5 - 33.0 pg 31.2    MCHC 31.5 - 36.5 g/dL 35.6    RDW 10.0 - 15.0 % 11.9        Inpatient Glucose Trends:           Diabetes History:   Diabetes Type and Duration: Pancreatogenic diabetes s/p total pancreatectomy and islet autotransplant with insulin dependence since 1/2012  GAD65 antibody, zinc transporter 8 antibody, islet antibody, insulin antibody not available on epic search.    Numerous C-peptides:  Component      Latest Ref Rng 8/28/2018  6:47 AM 9/3/2018  6:41 PM 9/6/2018  8:00 AM 9/7/2018  6:55 AM 4/18/2019  11:04 AM 4/3/2025  2:01 PM   C-Peptide      0.9 - 6.9 ng/mL 0.3 (L)  0.2 (L)  1.8  2.8  1.8  0.5 (L)    Patient Fasting?      Yes         PTA Medication Regimen:  started new pens  - Lantus 18 units AM, 12 units PM  - Novolog ICR 1:16  - Novolog correction 1:35 (more intuitive dosing)  Missing doses?: denies  Historical Diabetes Medications: MDI, insulin pumps    Glucose monitoring device/frequency/trends: Has not picked up dexcom yet--does not think it has been filled. Accucheck (checking 4-6 times daily) running 400 to \"high\" generally  Hypoglycemia PTA:   - Frequency: none  - Severity: + hx of hypoglycemic seizures  - Awareness: absent/decreased  - Treatment: candies in purse, glucose " liquid shot, glucose tabs    Outpatient Diabetes Provider: [facility]: Dr. Maher (LOV 4/3/25)  Formal Diabetes Education/Educator: yes    ------------------------  Complications:  + peripheral neuropathy (numbness, pain in feet, altered sensation), denies retinopathy (last eye exam: 2024 will need to re-estabish in [place]), denies nephropathy, unclear if gastroparesis (Sub-optimal study in March 2016 showed 100% retention at 1 hour and then rapid emptying), denies macrovascular disease      Most recent A1c: 19.1% 4/15/25    Hemoglobin at time of most recent A1c: 11.9  RBC transfusion 3 months prior to most recent A1c:  none      History of DKA: yes - in past 6 months in [place] (2024)    Safety Plan:   - Glucagon: +Gvoke   - Ketone Strips: not asked  Medic Alert if Type 1: not asked    Diet/Lifestyle/Living Situation: not eating much (taste in mouth and sores in mouth and regurgitations/emesis with PO intake); continuous 60mL/hr diabetic source (100g cho per 1000mL); no interruption until G tube fell out 4/15 in afternoon has not been using creon or any enzymes with the formula)    Ability to [place] Prescribed Regimen:  unclear consistency/dosing   Food/Housing Insecurity: no concerns          Medications Impacting Glycemia:    Steroids: hydrocortisone 10 mg am, 15 mg HS (adrenal insufficiency)  D5W containing solutions/medications: none  Other medications impacting glucose: none         Diet/Nutrition:    Orders Placed This Encounter      Regular Diet Adult     Supplements:  none  TF: none  TPN: none          Review of Systems:    Constitutional: + weight loss for past 6 months, feels dehydrated  HEENT: denies vision changes  Respiratory: denies SOB  GI: +pain with eating, denies changes in stool pattern, constipation and diarrhea.    : no UTIs or yest infections in the past few months  MSK/Integumentary: denies injection site reactions         Past Medical History:       Past Medical History:   Diagnosis  Date    Chronic abdominal pain     Chronic pancreatitis (H)     S/P pancreatectomy    Depression with anxiety     Gastro-oesophageal reflux disease     Hypothyroidism 4/23/2015    Kidney stones     Low serum cortisol level     Migraines     Other chronic pain     STOMACH    Other chronic pain     LUMBAR SPINE    Peripheral neuropathy     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    TPIAT    Spasm of sphincter of Oddi              Past Surgical History:      Past Surgical History:   Procedure Laterality Date    ARTHROPLASTY CARPOMETACARPAL (THUMB JOINT)  5/2/2014    Procedure: ARTHROPLASTY CARPOMETACARPAL (THUMB JOINT);  Surgeon: Carina Panda MD;  Location: [facility]    CHOLECYSTECTOMY  2004    COLONOSCOPY  7/18/2014    Procedure: COLONOSCOPY;  Surgeon: Aurora Sahu MD;  Location: [facility]    COLONOSCOPY N/A 8/1/2017    Procedure: COLONOSCOPY;  Colonoscopy and upper endoscopy;  Surgeon: Deirdre Harris MD;  Location: [facility]    ENDOSCOPIC RETROGRADE CHOLANGIOPANCREATOGRAM      ENDOSCOPIC RETROGRADE CHOLANGIOPANCREATOGRAM  4/19/2011    Procedure:ENDOSCOPIC RETROGRADE CHOLANGIOPANCREATOGRAM; Pancreatic Stent Placement      ENDOSCOPIC RETROGRADE CHOLANGIOPANCREATOGRAM  5/26/2011    Procedure:ENDOSCOPIC RETROGRADE CHOLANGIOPANCREATOGRAM; with Pancreatic Stent Removal; Surgeon:DALE MIMS; Location:[facility]    ENDOSCOPY UPPER, COLONOSCOPY, COMBINED  4/25/2012    Procedure:COMBINED ENDOSCOPY UPPER, COLONOSCOPY; Enteroscopy with Bile Duct Stent Removal, Colonoscopy  *[facility] Safe Room*; Surgeon:GRACY GODWIN; Location[facility]    ESOPHAGOSCOPY, GASTROSCOPY, DUODENOSCOPY (EGD), COMBINED  5/26/2011    Procedure:COMBINED ESOPHAGOSCOPY, GASTROSCOPY, DUODENOSCOPY (EGD); Surgeon:DALE MIMS; [facility]    ESOPHAGOSCOPY, GASTROSCOPY, DUODENOSCOPY (EGD), COMBINED N/A 10/30/2014    Procedure: COMBINED ESOPHAGOSCOPY, GASTROSCOPY, DUODENOSCOPY (EGD), BIOPSY SINGLE OR MULTIPLE;   Surgeon: Sarai Moon MD;  [facility]    ESOPHAGOSCOPY, GASTROSCOPY, DUODENOSCOPY (EGD), COMBINED Left 7/6/2015    Procedure: COMBINED ESOPHAGOSCOPY, GASTROSCOPY, DUODENOSCOPY (EGD), BIOPSY SINGLE OR MULTIPLE;  Surgeon: Thomas Estrada MD;  [facility]: [facility]    ESOPHAGOSCOPY, GASTROSCOPY, DUODENOSCOPY (EGD), COMBINED N/A 7/8/2016    Procedure: COMBINED ESOPHAGOSCOPY, GASTROSCOPY, DUODENOSCOPY (EGD), BIOPSY SINGLE OR MULTIPLE;  Surgeon: Eloy Klein MD;  [facility]: [facility]    ESOPHAGOSCOPY, GASTROSCOPY, DUODENOSCOPY (EGD), COMBINED N/A 8/4/2016    Procedure: COMBINED ESOPHAGOSCOPY, GASTROSCOPY, DUODENOSCOPY (EGD), BIOPSY SINGLE OR MULTIPLE;  Surgeon: Jason Brown MD;  [facility]: [facility]    ESOPHAGOSCOPY, GASTROSCOPY, DUODENOSCOPY (EGD), COMBINED N/A 8/1/2017    Procedure: COMBINED ESOPHAGOSCOPY, GASTROSCOPY, DUODENOSCOPY (EGD);;  Surgeon: Deirdre Harris MD;  [facility]    ESOPHAGOSCOPY, GASTROSCOPY, DUODENOSCOPY (EGD), COMBINED N/A 6/12/2019    Procedure: ESOPHAGOGASTRODUODENOSCOPY (EGD);  Surgeon: Jose Francisco Perdomo MD;  Location:  GI    GYN SURGERY      Hysterectomy and USO    [facility] ENDOSCOPY W EUS  7/20/2011    Procedure:COMBINED ENDOSCOPIC ULTRASOUND, ESOPHAGOSCOPY, GASTROSCOPY, DUODENOSCOPY (EGD); Surgeon:DARVIN DONOHUE; Location[facility]    HERNIORRHAPHY VENTRAL N/A 9/15/2016    Procedure: HERNIORRHAPHY VENTRAL;  Surgeon: Juanita Bernabe MD;  [facility]    HYSTERECTOMY  1997 or 1998    USO    INCISION AND DRAINAGE ABDOMEN WASHOUT, COMBINED  8/16/2012    Procedure: COMBINED INCISION AND DRAINAGE ABDOMEN WASHOUT;  ,debridement and Drainage Post Appendectomy;  Surgeon: Ron Austin MD;  [facility]    INJECT TRANSVERSUS ABDOMINIS PLANE (TAP) BLOCK BILATERAL Bilateral 5/26/2016    Procedure: INJECT TRANSVERSUS ABDOMINIS PLANE (TAP) BLOCK BILATERAL;  Surgeon: Leonard Mccallum MD;  [facility]    IR NG TUBE PLACEMENT REQ  "RAD & FLUORO  12/4/2017    LAPAROSCOPIC APPENDECTOMY  7/30/2012    Procedure: LAPAROSCOPIC APPENDECTOMY;  Open Appendectomy;  Surgeon: Ron Austin MD;  [facility]    PANCREATECTOMY, TRANSPLANT AUTO ISLET CELL, COMBINED  1/6/2012    Procedure:COMBINED PANCREATECTOMY, TRANSPLANT AUTO ISLET CELL; Total  Pancreatectomy, Auto Islet Transplant, splenectomy, 18fr. transgastric-jejunal feeding tube placement, liver biopsy; Surgeon:PALAK LEE; [facility]    REPLACE GASTROSTOMY TUBE, PERCUTANEOUS N/A 8/30/2017    Procedure: REPLACE GASTROSTOMY TUBE, PERCUTANEOUS;  GJ Tube Change;  Surgeon: Jose Nath PA-C;  [facility]    SPLENECTOMY               Social History:      Social History     Tobacco Use    Smoking status: Never    Smokeless tobacco: Never   Substance Use Topics    Alcohol use: No     Alcohol/week: 0.0 standard drinks of alcohol        History   Sexual Activity    Sexual activity: Yes             Family History:      Family History   Problem Relation Age of Onset    Hypertension Mother     Diabetes Mother     Osteoporosis Mother     Cancer Father         pancreatic cancer    Diabetes Maternal Grandmother     Cardiovascular Maternal Grandmother     Cancer Maternal Grandfather         lung cancer    Cancer Sister         brain    Cancer Sister         liver cancer             Physical Exam:    /65 (BP Location: Right arm)   Pulse 80   Temp 98.8  F (37.1  C) (Oral)   Resp 16   Ht 1.575 m (5' 2\")   Wt 45.7 kg (100 lb 12.8 oz)   SpO2 98%   BMI 18.44 kg/m     General:  tired-appearing, in no acute distress.  HEENT:  sclera not injected  Lungs:  no SOB  Skin:  warm and dry, no obvious lesions  MSK:   moving all extremities  Mental Status:  Alert and oriented x3  Psych:   Cooperative, good eye contact, full/appropriate affect         Laboratory Data:      Lab Results   Component Value Date    A1C 19.1 (H) 04/15/2025    A1C 18.9 (H) 04/03/2025    A1C 17.1 (H) " 01/23/2025    A1C 11.1 (H) 03/02/2023    A1C 13.3 (H) 09/26/2022    A1C 7.3 (H) 11/09/2020    A1C 6.7 (A) 11/21/2019    A1C 8.2 (H) 06/11/2019    A1C Canceled, Test credited 06/10/2019    A1C 9.6 (H) 04/18/2019     Recent Labs   Lab Test 04/15/25  2130   WBC 10.0   RBC 3.81   HGB 11.9   HCT 33.4*   MCV 88   MCH 31.2   MCHC 35.6   RDW 11.9        Recent Labs   Lab Test 04/16/25  0826 04/16/25  0726 04/15/25  2354 04/15/25  2130 04/03/25  1401   NA  --   --   --  123* 122*   POTASSIUM  --   --   --  3.6 3.8   CHLORIDE  --   --   --  85* 83*   CO2  --   --   --  23 22   ANIONGAP  --   --   --  15 17*   * 98   < > 918* 940*   BUN  --   --   --  6.1* 4.2*   CR  --   --   --  0.35* 0.30*   ELIZA  --   --   --  8.9 9.1    < > = values in this interval not displayed.     Recent Labs   Lab Test 04/15/25  2130   PROTTOTAL 6.2*   ALBUMIN 3.9   BILITOTAL 0.3   ALKPHOS 186*   *   *            Assessment and Recommendations:       Assessment:   Post-pancreatectomy diabetes s/p TPIAT 1/2012 treated as Type 1 Diabetes Mellitus complicated by peripheral neuropathy, hypoglycemia unawareness, and hyperglycemia. Poor control  (A1c 19.1 % 4/16/25, Hgb: 11.9)  Approaching HHS [glucose 918, calculated serum osmolality 299, ketones 1.51, vpH 7.43]    Plan/Recommendations:    - Continue IV insulin drip.  - Novolog Meal Coverage: 1 units per 20 g CHO, TID AC and PRN with snacks/supplements   - BG monitoring: Every 1-2 hours on insulin drip     - Hypoglycemia protocol    - Carb counting protocol     Discussion:  Pt admitted with hyperglycemia and feeding tube malfunction. Will need general surgery/GI for g-tube replacement. Minimal PO intake outpatient due to mouth sores and altered taste. Will still place ICR cautiously. Discussed keeping pt on drip to optimize blood sugars prior to operation and get a better idea of insulin needs. She is in agreement with this. Drip rate very stable so far today using 0.5 units/hr.  Will not start Lantus dose as 0.5 units/hr is the lowest drip rate (if started Lantus, pt would need dextrose fluids to support insulin drip). Pt notes she typically runs TF continuously at 60mL/hr at home (Diabetic Source = 100g cho per 1000mL, so 6g/hr or 144g daily at home TF rate). Hyperglycemia prior to admission likely multi-factorial. She shares she has not taken any long-acting insulin since 4/11. Seems to intuitively dose Novolog for carb coverage and correction. Denies missing doses.     Will contact discharge pharmacy to see if they are able to fill Dexcom G7 for pt and we could potentially start this before discharge.    Discharge Planning: (tentative)    Medications: TBD    Test Claims:  none needed.   Education:  Needs to be assessed closer to discharge.    Outpatient Follow-up:  recommend [facility]; next scheduled 5/13/25 with Libby Jay RN and 8/21/25 with Dr. Maher      Thank you for this consult. IDS will continue to follow.      Please notify Inpatient Diabetes Service if changes are planned to steroids, nutrition, TPN/TF and anticipated procedures requiring prolonged NPO status.     Earlene Samuel PA-C  Inpatient Diabetes Service  Available on [employer] or Secure Chat     To contact Inpatient Diabetes Service:     7 AM - 5 PM: Page the IDS DAVID following the patient that day (see filed or incomplete progress notes/consult notes under Endocrinology)    OR if uncertain of provider assignment: page job code [phone]    5 PM - 7 AM: First call after hours is to primary service.    For urgent after-hours questions, page job code for on call fellow: [phone]     I spent a total of 80 minutes on the date of the encounter doing prep/post-work, chart review, history and exam, documentation and further activities per the note including lab review, multidisciplinary communication, counseling the patient and/or coordinating care regarding acute hyper/hypoglycemic management, as well as discharge  management and planning/communication.  See note for details.

## 2025-04-16 NOTE — H&P
[facility]    History and Physical - Medicine Service, NAIF TEAM        Date of Admission:  4/15/2025    Assessment & Plan      Patient is a 59-year-old female admitted on 4/15/2025 with a significant past medical history of insulin-dependent diabetes mellitus after pancreatectomy, chronic pancreatitis, migraine, hypothyroidism, and G tube nutrition who presented to the ED with a displaced  tube and was admitted for IR replacement of the G tube.      # PEG tube displacement  # Bloating  Patient reports that her G tube became dislodged approximately one day prior to admission with an audible pop. She notes distention of her abdomen with bloating and pain.   - PTA tramadol 50mg q12 PRN  - PTA hydrocodone-APAP  - IR consulted for PEG tube exchange     # Post-pancreatectomy diabetes (Type 1)  # TPAIT (2012)  She was previously seen outpatient by both endocrinology and PCP. Her A1c on 1/23 was extremely high at 17.1. her glucose values have ranged in the 400s to 900s outpatient. She was seen by endocrinology, who report that it is unlikely she is taking her dose of insulin at this A1c and recommended exchange for new insulin. On admission, glucose was in the 500s, though patient had no signs of DKA. Per assessment of her endocrinologist, this is likely part of the cause of her malnutrition.   BG lability with hypoglycemia . Endocrinology consulted for assistance.   - On insulin gtt, monitor K, glucose while on insulin drip.  - Endocrinology consulted, appreciate recommendations  - Resumption of PTA insulin pump and CGMS if able  - Creon 12,000-38,000 -60,000 unit delayed-release capsule -Take 8 capsules (96,000 units of lipase total) by mouth 3 (three) times per day with meals.   - 0-12 Units under the skin 4 (four) times per day (before meals and nightly).   - 15 U Glargine qHS    # Malnutrition, severe  # Cachexia  Patient reports > 30 lb weight loss in the last 6  months. Per endocrinology notes, this is likely multifactorial due to GI issues and uncontrolled diabetes, chronic abdominal pain   - Dietician consult  - Continue TF as PTA    # History of adrenal insufficiency  Followed by Dr. Maher. Previously suppressed AM cortisol and has been on empiric therapy for possible adrenal insufficiency, unclear if central vs transient. Most recent clinic note describes inability to wean steroids due to hypoglycemia episodes.   - Continue PTA hydrocortisone oral    #Pain  Patient reports severe pain in her abdomen and back. She reports that she has previously taken methadone for this, but no record of methadone later than 2010 could be located in her chart  - Pain team consult  - Pharmacy med rec     #Concern for malignancy  #Elevated CEA  Patient was seen and evaluated by PCP with a stat CTCAP to assess for malignancy on 1/23. This scan was negative. She was scheduled for follow up with her PCP but has yet to follow up with her PCP. She notes elevated CEA since she was in [place] but has not had a workup showing any cancer at this point.   - Outpatient follow up, continue to treat malnutrition, chronic pancreatic issues, pain, and diabetes.           Diet:  NPO pending IR procedure  DVT Prophylaxis: Pneumatic Compression Devices  Mckee Catheter: Not present  Fluids: Bolus + PO  Lines: None     Cardiac Monitoring: None  Code Status:  Full code    Clinically Significant Risk Factors Present on Admission         # Hyponatremia: Lowest Na = 123 mmol/L in last 2 days, will monitor as appropriate  # Hypochloremia: Lowest Cl = 85 mmol/L in last 2 days, will monitor as appropriate                               Disposition Plan      Expected Discharge Date: 04/16/2025                Case to be staffed in AM.       Jasvir Higgins MD  Medicine Service, [facility]  Securely message with [employer] (more info)  Text page via [employer]  "Paging/Directory   See signed in provider for up to date coverage information  ______________________________________________________________________    Chief Complaint   Dislodged G tube    History is obtained from the patient    History of Present Illness   Patient is a 59-year-old female admitted on 4/15/2025 with a significant past medical history of insulin-dependent diabetes mellitus after pancreatectomy presenting with abdominal pain.    Pt reports overall feeling unwell and feeling that her abdomen was distended. She felt feverish this morning and for the past 3 days along with increased swelling. She tried to drink more water today and felt a \"pop\" as well as stomach pain and back pain before coming in. Reports her abdominal pain is in the lower abdomen and is accompanied by bloating. Reports she has never had her PEG tube replaced. She reports swelling in her lower extremities as well which she states makes her feet feel numb. She is dependant on tube feeds, using diabetic source tube feeds, however she also eats PO. Endorses some pleuritic chest pain/L sided abdominal pain with deep breaths.    Patient complains of fecal incontinence and urinary incontinence for 4 months. She would also like increasing CEA level investigated: 14 -> 17. She reports some bumps on the back of her head. States she did not get many results despite being in and out of the hospital in [place] every other week so she felt coming to [facility] would help her to get more answers.    She was seen on 1/23 in the [facility], at which time she was worked up for possible malignancy with a negative chest -abd-pelvis CT. She was also referred to endocrinology for an A1c of 17.1. She was seen by endocrinology on 4/3 at which time it was noted that her hyperglycemia was likely not due to insulin failure and there was likely another cause. She was given replacement insulin,  diabetes education, and a GI referral. She was admitted to the " [facility] on 4/16 due to G tube malfunction and desires further workup of these issues.        Past Medical History    Past Medical History:   Diagnosis Date    Chronic abdominal pain     Chronic pancreatitis (H)     S/P pancreatectomy    Depression with anxiety     Gastro-oesophageal reflux disease     Hypothyroidism 4/23/2015    Kidney stones     Low serum cortisol level     Migraines     Other chronic pain     STOMACH    Other chronic pain     LUMBAR SPINE    Peripheral neuropathy     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    TPIAT    Spasm of sphincter of Oddi        Past Surgical History   Past Surgical History:   Procedure Laterality Date    ARTHROPLASTY CARPOMETACARPAL (THUMB JOINT)  5/2/2014    Procedure: ARTHROPLASTY CARPOMETACARPAL (THUMB JOINT);  Surgeon: Carina Panda MD;  Location: [facility] OR    CHOLECYSTECTOMY  2004    COLONOSCOPY  7/18/2014    Procedure: COLONOSCOPY;  Surgeon: Aurora Sahu MD;  [facility]: [facility]    COLONOSCOPY N/A 8/1/2017    Procedure: COLONOSCOPY;  Colonoscopy and upper endoscopy;  Surgeon: Deirdre Harris MD;  [facility]    ENDOSCOPIC RETROGRADE CHOLANGIOPANCREATOGRAM      ENDOSCOPIC RETROGRADE CHOLANGIOPANCREATOGRAM  4/19/2011    Procedure:ENDOSCOPIC RETROGRADE CHOLANGIOPANCREATOGRAM; Pancreatic Stent Placement      ENDOSCOPIC RETROGRADE CHOLANGIOPANCREATOGRAM  5/26/2011    Procedure:ENDOSCOPIC RETROGRADE CHOLANGIOPANCREATOGRAM; with Pancreatic Stent Removal; Surgeon:DALE MIMS; Location:[facility]    ENDOSCOPY UPPER, COLONOSCOPY, COMBINED  4/25/2012    Procedure:COMBINED ENDOSCOPY UPPER, COLONOSCOPY; Enteroscopy with Bile Duct Stent Removal, Colonoscopy  *Latex Safe Room*; Surgeon:GRACY GODWIN; Location[facility]    ESOPHAGOSCOPY, GASTROSCOPY, DUODENOSCOPY (EGD), COMBINED  5/26/2011    Procedure:COMBINED ESOPHAGOSCOPY, GASTROSCOPY, DUODENOSCOPY (EGD); Surgeon:DALE MIMS; [facility]    ESOPHAGOSCOPY, GASTROSCOPY,  DUODENOSCOPY (EGD), COMBINED N/A 10/30/2014    Procedure: COMBINED ESOPHAGOSCOPY, GASTROSCOPY, DUODENOSCOPY (EGD), BIOPSY SINGLE OR MULTIPLE;  Surgeon: Sarai Moon MD;  [facility]    ESOPHAGOSCOPY, GASTROSCOPY, DUODENOSCOPY (EGD), COMBINED Left 7/6/2015    Procedure: COMBINED ESOPHAGOSCOPY, GASTROSCOPY, DUODENOSCOPY (EGD), BIOPSY SINGLE OR MULTIPLE;  Surgeon: Thomas Estrada MD;  [facility]    ESOPHAGOSCOPY, GASTROSCOPY, DUODENOSCOPY (EGD), COMBINED N/A 7/8/2016    Procedure: COMBINED ESOPHAGOSCOPY, GASTROSCOPY, DUODENOSCOPY (EGD), BIOPSY SINGLE OR MULTIPLE;  Surgeon: Eloy Klein MD;  [facility]    ESOPHAGOSCOPY, GASTROSCOPY, DUODENOSCOPY (EGD), COMBINED N/A 8/4/2016    Procedure: COMBINED ESOPHAGOSCOPY, GASTROSCOPY, DUODENOSCOPY (EGD), BIOPSY SINGLE OR MULTIPLE;  Surgeon: Jason Brown MD;  Location:  GI    ESOPHAGOSCOPY, GASTROSCOPY, DUODENOSCOPY (EGD), COMBINED N/A 8/1/2017    Procedure: COMBINED ESOPHAGOSCOPY, GASTROSCOPY, DUODENOSCOPY (EGD);;  Surgeon: Deirdre Harris MD;  Location:  GI    ESOPHAGOSCOPY, GASTROSCOPY, DUODENOSCOPY (EGD), COMBINED N/A 6/12/2019    Procedure: ESOPHAGOGASTRODUODENOSCOPY (EGD);  Surgeon: Jose Francisco Perdomo MD;  Location:  GI    GYN SURGERY      Hysterectomy and USO    [facility] ENDOSCOPY W EUS  7/20/2011    Procedure:COMBINED ENDOSCOPIC ULTRASOUND, ESOPHAGOSCOPY, GASTROSCOPY, DUODENOSCOPY (EGD); Surgeon:DARVIN DONOHUE; Location: GI    HERNIORRHAPHY VENTRAL N/A 9/15/2016    Procedure: HERNIORRHAPHY VENTRAL;  Surgeon: Juanita Bernabe MD;  [facility]    HYSTERECTOMY  1997 or 1998    USO    INCISION AND DRAINAGE ABDOMEN WASHOUT, COMBINED  8/16/2012    Procedure: COMBINED INCISION AND DRAINAGE ABDOMEN WASHOUT;  ,debridement and Drainage Post Appendectomy;  Surgeon: Ron Austin MD;  [facility]    INJECT TRANSVERSUS ABDOMINIS PLANE (TAP) BLOCK BILATERAL Bilateral 5/26/2016     Procedure: INJECT TRANSVERSUS ABDOMINIS PLANE (TAP) BLOCK BILATERAL;  Surgeon: Leonard Mccallum MD;  [facility]    IR NG TUBE PLACEMENT REQ RAD & FLUORO  2017    LAPAROSCOPIC APPENDECTOMY  2012    Procedure: LAPAROSCOPIC APPENDECTOMY;  Open Appendectomy;  Surgeon: Ron Austin MD;  [facility]    PANCREATECTOMY, TRANSPLANT AUTO ISLET CELL, COMBINED  2012    Procedure:COMBINED PANCREATECTOMY, TRANSPLANT AUTO ISLET CELL; Total  Pancreatectomy, Auto Islet Transplant, splenectomy, 18fr. transgastric-jejunal feeding tube placement, liver biopsy; Surgeon:PALAK LEE; [facility]    REPLACE GASTROSTOMY TUBE, PERCUTANEOUS N/A 2017    Procedure: REPLACE GASTROSTOMY TUBE, PERCUTANEOUS;  GJ Tube Change;  Surgeon: Jose Nath PA-C;  [facility]    SPLENECTOMY         Prior to Admission Medications   Prior to Admission Medications   Prescriptions Last Dose Informant Patient Reported? Taking?   Acetone, Urine, Test (KETONE TEST) STRP   No No   Si each by In Vitro route as needed (hyperglycemia)   CONTOUR NEXT TEST test strip   No No   Sig: USE TO TEST BLOOD SUGAR 6 TIMES DAILY OR AS DIRECTED. Call clinic to schedule follow up appointment.  IMPORTANT   Continuous Blood Gluc  (DEXCOM G6 ) RICARDO   No No   Sig: Use to read blood sugars as per 's instructions.   Continuous Blood Gluc Sensor (DEXCOM G6 SENSOR) MISC   No No   Sig: Change every 10 days.   Continuous Blood Gluc Transmit (DEXCOM G6 TRANSMITTER) MISC   No No   Sig: Change every 3 months.   Continuous Glucose  (DEXCOM G7 ) RICARDO   No No   Sig: Use to read blood sugars as per 's instructions.   Continuous Glucose Sensor (DEXCOM G7 SENSOR) MISC   No No   Sig: Change every 10 days.   DULoxetine (CYMBALTA) 30 MG capsule   No No   Sig: Take 1 capsule (30 mg) by mouth daily With 60mg capsule for total dose of 90mg   DULoxetine (CYMBALTA) 60 MG EC capsule   No  No   Sig: Take 1 capsule (60 mg) by mouth daily With 30mg capsule for total dose of 90mg   Glucagon (GVOKE HYPOPEN 2-PACK) 1 MG/0.2ML pen   No No   Sig: Inject the contents of 1 device under the skin into lower abdomen, outer thigh, or outer upper arm as needed for hypoglycemia. If no response after 15 minutes, additional 1 mg dose from a new device may be injected while waiting for emergency assistance.   Injection Device for insulin (NOVOPEN ECHO) RICARDO   No No   Sig: Use with   Insulin   Insulin Disposable Pump (OMNIPOD 5 G6 INTRO, GEN 5,) KIT   No No   Si each See Admin Instructions Use continuously to infuse insulin.   Nutritional Supplements (BOOST HIGH PROTEIN) LIQD   No No   Sig: After above baseline labs are drawn, give: 6 mL/kg to maximum of 360 mL; the beverage is to be consumed within 5 minutes.   Patient taking differently: as needed. After above baseline labs are drawn, give: 6 mL/kg to maximum of 360 mL; the beverage is to be consumed within 5 minutes.   SUMAtriptan (IMITREX) 50 MG tablet  Self No No   Sig: Take 1 tablet (50 mg) by mouth at onset of headache for migraine Take 1 Tab by mouth Once as needed for Migraine Headache. May repeat after two hours.  Maximum dose 200 mg/24 hours.   acetaminophen (TYLENOL) 325 MG tablet   Yes No   Sig: Take 3 tablets (975 mg) by mouth every 8 hours   alendronate (FOSAMAX) 70 MG tablet   No No   Sig: Take 1 tablet (70 mg) by mouth every 7 days On Sundays take first thing in the morning with plain water and remain upright for at least 30 minutes and until after first food of the day    Do not restart Fosamax (alendronate) until your difficulty swallowing has resolved and you have finished the entire course of fluconazole (Diflucan).   alum & mag hydroxide-simethicone (MYLANTA/MAALOX) 200-200-25 MG CHEW chewable tablet   Yes No   Sig: Take 1 tablet by mouth 3 times daily as needed for indigestion   amylase-lipase-protease (CREON 12) 11221 units CPEP   No No    Sig: Take 6 to 8 capsules by mouth with meals and take 4 capsules with snacks. Needs up to 25 capsules per day   cyclobenzaprine (FLEXERIL) 10 MG tablet   No No   Sig: Take 0.5 tablets (5 mg) by mouth every evening as needed for muscle spasms.   cyclobenzaprine (FLEXERIL) 5 MG tablet   No No   Sig: Take 1 tablet (5 mg) by mouth 3 times daily as needed for muscle spasms   diclofenac (FLECTOR) 1.3 % Patch   No No   Sig: Place 1 patch onto the skin 2 times daily   diclofenac (VOLTAREN) 1 % GEL  Self Yes No   Si g Apply 2 g to skin four times a day as needed (to affected upper extremity joint(s)). Maximum 8g/day per joint, 16g/day total.   dicyclomine (BENTYL) 10 MG capsule   Yes No   Sig: Take 10 mg by mouth every 6 hours   dronabinol (MARINOL) 2.5 MG capsule   No No   Sig: Take 2 capsules (5 mg) by mouth 2 times daily as needed   famotidine (PEPCID) 20 MG tablet   No No   Sig: Take 1 tablet (20 mg) by mouth At Bedtime   hydrocortisone (CORTAID) 1 % external cream   No No   Sig: Apply topically 3 times daily   hydrocortisone (CORTEF) 10 MG tablet   Yes No   Sig: Take 10 mg in the morning and 10 mg along with a 5 mg tablet at bedtime for a total dose of 15 mg at bedtime. Watch for hypoglycemia recurrence.   hydrocortisone (CORTEF) 5 MG tablet   Yes No   Sig: Take 5 mg by mouth At Bedtime along with a 10 mg tablet for a total dose of 15 mg   hydrocortisone sodium succinate PF (SOLU-CORTEF) 100 MG injection   No No   Sig: Inject 100mg (1 mL) into muscle in emergency or unable to take oral hydrocortisone. Go to emergency room if given. ActoVial NDC [address]-54. Note to pharmacy: Provide two 3ml syringes with 23g 1 inch needles.   insulin aspart (NOVOLOG VIAL) 100 UNITS/ML vial   No No   Sig: USE TO FILL INSULIN PUMP RESERVOIR. NEEDS 150 UNITS EVERY 3 DAYS. CALL [facility] TO SCHEDULE FOLLOW UP APPOINTMENT   insulin aspart (NOVOPEN ECHO) 100 UNIT/ML cartridge   No No   Sig: As  instructed per physician   insulin aspart  (NOVOPEN ECHO) 100 UNIT/ML cartridge   No No   Sig: Use 1 unit per 16 grams carb and 1 unit per 50 >120, or as directed, up to 80 units/day.   insulin glargine (LANTUS PEN) 100 UNIT/ML pen   No No   Sig: Take 16 units in the morning, 12 units at night, and adjust as needed based on MD instructions.   insulin pen needle (PIP PEN NEEDLES 32G X 4MM) 32G X 4 MM miscellaneous   No No   Sig: Use 6 pen needles daily or as directed.   levothyroxine (SYNTHROID/LEVOTHROID) 112 MCG tablet   No No   Sig: Take 1 tablet (112 mcg) by mouth daily   linaclotide (LINZESS) 145 MCG capsule   Yes No   Sig: Take 145 mcg by mouth every morning (before breakfast) as needed   lipase-protease-amylase (CREON) 62084-20152-99997 units CPEP   No No   Sig: Take 6 to 8 capsules with every meal, and 4 capsules with snacks. Up to 25 capsules per day   metoclopramide (REGLAN) 5 MG tablet   No No   Sig: Take 2 tablets (10 mg) by mouth 3 times daily   ondansetron (ZOFRAN) 4 MG tablet   No No   Sig: Take 1 tablet (4 mg) by mouth every 8 hours as needed for nausea   ondansetron (ZOFRAN-ODT) 4 MG ODT tab   No No   Sig: DISSOLVE ONE TABLET ON THE TONGUE EVERY 6 HOURS AS NEEDED FOR NAUSEA AND VOMITING   order for DME   No No   Sig: by Nasojejunal Tube route [place], [place]  Ph: [phone]  Fax: [phone]    Nutren 1.5 @ 10 ml/hr with advancement by 10 ml/hr q 8 hours to goal rate of 40 ml/hr.  This will provide 1440 kcals (27 kcal/kg/day), 65 g PRO (1.2 g/kg/day), 730 mL H2O, 169 g CHO and no Fiber daily.      pantoprazole (PROTONIX) 40 MG EC tablet   No No   Sig: Take 1 tablet (40 mg) by mouth 2 times daily   polyethylene glycol (MIRALAX/GLYCOLAX) packet  Self Yes No   Sig: Take 1 packet by mouth 2 times daily as needed for constipation    potassium chloride ER (KLOR-CON M) 20 MEQ CR tablet   No No   Sig: Take 1 tablet (20 mEq) by mouth daily   senna-docusate (SENOKOT-S/PERICOLACE) 8.6-50 MG tablet   No No   Sig: Take 1-2 tablets  by mouth daily as needed for constipation   sodium chloride (OCEAN) 0.65 % nasal spray   No No   Sig: Spray 1 spray into both nostrils daily as needed for congestion   sucralfate (CARAFATE) 1 GM tablet   No No   Sig: Take 1 tablet (1 g) by mouth 4 times daily (before meals and nightly)   topiramate (TOPAMAX) 100 MG tablet   No No   Sig: Take 1 tablet (100 mg) by mouth 2 times daily   traMADol (ULTRAM) 50 MG tablet   No No   Sig: Take 0.5-1 tablets (25-50 mg) by mouth every 6 hours as needed for moderate pain   traZODone (DESYREL) 100 MG tablet   No No   Sig: TAKE 1-2 TABLETS BY MOUTH AN HOUR BEFORE BEDTIME      Facility-Administered Medications: None           Physical Exam   Vital Signs: Temp: 98  F (36.7  C) Temp src: Oral BP: 105/67 Pulse: 69   Resp: 16 SpO2: 97 % O2 Device: None (Room air)    Weight: 100 lbs 12.8 oz    Constitutional: awake, alert, cooperative, no apparent distress, and appears stated age  Eyes: Lids and lashes normal, pupils equal, round and reactive to light, extra ocular muscles intact, sclera clear, conjunctiva normal  Hematologic / Lymphatic: no cervical lymphadenopathy and no supraclavicular lymphadenopathy  Respiratory: No increased work of breathing, good air exchange, clear to auscultation bilaterally, no crackles or wheezing  Cardiovascular: Normal apical impulse, regular rate and rhythm, normal S1 and S2, no S3 or S4, and no murmur noted  GI: hypoactive bowel sounds, non-distended, tenderness noted diffusely, voluntary guarding present, and no masses palpated, no hepatosplenomegally  Musculoskeletal: Notable 2+ lower extremity edema    Medical Decision Making             Data     I have personally reviewed the following data over the past 24 hrs:    10.0  \   11.9   / 330     123 (L) 85 (L) 6.1 (L) /  239 (H)   3.6 23 0.35 (L) \     ALT: 140 (H) AST: 213 (H) AP: 186 (H) TBILI: 0.3   ALB: 3.9 TOT PROTEIN: 6.2 (L) LIPASE: 7 (L)     TSH: N/A T4: N/A A1C: 19.1 (H)     Procal: N/A CRP: N/A  Lactic Acid: 1.1         Imaging results reviewed over the past 24 hrs:   Recent Results (from the past 24 hours)   CT Abdomen Pelvis w Contrast    Narrative    EXAM: CT ABDOMEN PELVIS W CONTRAST  LOCATION: [facility]  DATE: 04/16/2025    INDICATION: Abdominal pain. Fevers.  COMPARISON: CT chest, abdomen, and pelvis with IV contrast 01/28/2025.  TECHNIQUE: CT scan of the abdomen and pelvis was performed following injection of IV contrast. Multiplanar reformats were obtained. Dose reduction techniques were used.  CONTRAST: 57 mL Isovue 370 IV.    FINDINGS:   LOWER CHEST: Lung bases are clear. No pleural effusion on either side. Normal cardiac size. No pericardial effusion.    HEPATOBILIARY: Cholecystectomy. Intrahepatic pneumobilia, stable. No discrete hepatic lesion. Patent hepatic and portal veins.    PANCREAS: Surgically absent.    SPLEEN: Surgically absent.    ADRENAL GLANDS: Normal.    KIDNEYS/BLADDER: No urinary tract calculi. Both kidneys are well-perfused without hydronephrosis or hydroureter. Normal urinary bladder.    BOWEL: Resolved changes of pneumatosis adjacent to the hepatic flexure seen previously. Postoperative changes involving the stomach and the small bowel. Percutaneous gastrojejunostomy has been removed. Formed stool material within normal-caliber   redundant colon without mechanical obstruction, free gas or free fluid.    LYMPH NODES: No suspicious abdominopelvic adenopathy.    VASCULATURE: Slightly atherosclerotic normal-caliber distal abdominal aorta measuring 1.4 x 1.5 cm (image 41, series 902). Normal-caliber IVC.    PELVIC ORGANS: Hysterectomy. Stool-filled rectosigmoid. No adenopathy or free fluid.    MUSCULOSKELETAL: Diffuse body wall edema. Pars defects bilaterally at L5 resulting in low-grade anterolisthesis relative to S1.      Impression    IMPRESSION:   1.  Resolved changes of pneumatosis adjacent to the hepatic flexure seen previously.  Postoperative changes involving the stomach and the small bowel. Percutaneous gastrojejunostomy has been removed. Formed stool material within normal-caliber redundant   colon without obstruction, free gas or free fluid.    2.  Cholecystectomy. Intrahepatic pneumobilia, stable.     3.  No urinary tract calculi or obstruction. Hysterectomy.    4.  Diffuse body wall edema. Pars defects bilaterally at L5 resulting in low-grade anterolisthesis relative to S1, unchanged.

## 2025-04-16 NOTE — CONSULTS
"Pain Service Consultation Note  [facility]      Patient Name: Chantell Kidd  MRN: [id number]   Age: 59 year old  Sex: female  Date: April 16, 2025                                        Reviewed: Yes  Per [place] and national  review pulled from the national system including the state of [place] on 04/16/25 for the past one year.     12/26/2024 12/25/2024  Hydrocodone-Acetamin  Mg   12.00 3 [place]    12/11/2024 12/11/2024  Tramadol Hcl 50 Mg Tablet                60.00 7 [place]    11/13/2024 11/12/2024  Tramadol Hcl 50 Mg Tablet  60.00 7 [place]  05/09/2024 05/09/2024  Tramadol Hcl 50 Mg Tablet  20.00 5 [place]      Pain Medications/Prescriber: last rx was prescribed by Alicia Robertson of [place], [place].    Referring Provider:      Jasvir Higgins MD     Referring Service:  medicine  Reason for Consultation: \"Patient with extensive pain, reports history of buprenorphrine use but no active prescription noted \"    Assessment/Recommendations:  Chantell Kidd is a 59 year old female who has PMH of insulin-dependent diabetes mellitus after pancreatectomy, chronic pancreatitis, migraine, hypothyroidism, and G tube nutrition who is admitted on 4/15/25 to observation unit with a displaced tube and will pursue G tube placement by IR.    Pain service consulted to assist with pain management as pt reports use of buprenorphine but not records indicating recent prescription.    Chantell is seen lying in bed with RN at the bedside through out the whole visit.  Chantell reports pain at abdomen and back pain.  She states that she has been residing in [place] and has been feeling unwell since Nov. 2024, and has been in and out of the hospital there for electrolyte abnormality, pain.  One hospitalization, the CEA was noted to be elevated at 19 and was recommended for her to follow up with oncology in [place].  However she returned to [place] in Jan. 2025 to obtain care instead as she states she was more familiar with the [facility] system.  However, " "since Jan. And March she has been followed by a local PCP whom she is trying to establish care and work up is ongoing.  She insists that she is admitted here now and would like definitive answers and plans for pain and medical issues.    She states that she has been taking methadone and oxycodone for abdominal and back pain.  Her history changed many times during the visit regarding to types of opioid medication, dose, frequency of use, how she was using them vs how they were prescribed to her.  Extensive time obtaining this history.  After much time,  Chantell states that she was at a  clinic in [place] with a pain medication agreement-she was prescribed \"methadone 40mg 1-2 tablets/day\" but would receive  \"only 20-30 pills\" at a time for approximately 1 month at a time.  Also states was prescribed oxycodone 10mg 4x/day PRN.  States last rx was in [place] sometime in Dec. 2024.  Last took methadone 40mg in the middle of March 2025-from stockpiled methadone because she only takes methadone as needed. \[facility] [place] [facility]\"" last rx of oxycodone was here in [place] by her PCP and has \"2 pills left at home.\"  She states she has no methadone left at home.    In reviewing her chart and national  for the past 1 year,  it does not appear that she was prescribed methadone or oxycodone at all or consistently.  Not sure how she came up with the history above.  Have not seen any opioid prescription here in [place] for the past year.  This is rather troublesome.     Plan:   Acetaminophen 975mg PO 8 hours  Flexeril 5mg PO TID PRN  Oxycodone 5 mg PO Q 4 hours PRN while inpatient with work up ongoing or tramadol 50mg  PO Q 4 hours PRN. But not both.     Please provide very short tapering script upon discharge.      Consider gabapentin 100mg PO TID.   Lidocaine patches 1-3 patches Q 24 hours, 12 hours on and 12 hours off  Menthol patches, 1 patch TD Q 8 hours  Please refer to [facility] pain clinic  Please follow up with GI or PCP or oncology as recommended " regarding elevated CEA.    Bowel regimen     Thank you for the opportunity to participate in the care of Chantell Kidd  Pain Service will sign off.    Discussed with attending anesthesiologist  Primary Service Contacted with Recommendations? 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    TPIAT    Spasm of sphincter of Oddi          Family History:    Family History   Problem Relation Age of Onset    Hypertension Mother     Diabetes Mother     Osteoporosis Mother     Cancer Father         pancreatic cancer    Diabetes Maternal Grandmother     Cardiovascular Maternal Grandmother     Cancer Maternal Grandfather         lung cancer    Cancer Sister         brain    Cancer Sister         liver cancer       Social History:  Social History     Tobacco Use    Smoking status: Never    Smokeless tobacco: Never   Substance Use Topics    Alcohol use: No     Alcohol/week: 0.0 standard drinks of alcohol             Review of Systems:  Complete ROS reviewed. Unless otherwise noted, all other systems found to be negative.        Laboratory Results:  Recent Labs   Lab Test 04/15/25  2130 09/26/22  0919 11/12/20  0600 11/10/20  0608 11/09/20  0741   INR  --   --  1.06   < > 1.05      < > 333   < >  --    PTT  --   --   --   --  25   BUN 6.1*   < > 8   < > 5*    < > = values in this interval not displayed.       Allergies:  Allergies   Allergen Reactions    Corticosteroids Other (See Comments)     All oral, IV and injectable steroids are contraindicated (unless in life threatening situations) in  Islet Auto transplant recipients. They can cause irreversible loss of islet cell function. Please contact patient's transplant care coordinator YURI Cortez RN at [phone]/pager [phone] and/or endocrinologist prior to administration.      Chocolate Flavoring Agent (Non-Screening) Rash     Breaks out when eats chocolate         Current Pain Related Medications:  Medications related to Pain Management (From now, onward)      Start     Dose/Rate Route Frequency Ordered Stop    04/16/25 0800  DULoxetine (CYMBALTA) DR capsule 90 mg        Note to Pharmacy: PTA Sig:Take 1 capsule (30 mg) by mouth daily With 60mg capsule for total dose of 90mg      90 mg Oral DAILY 04/16/25 0550      04/16/25 0800  topiramate (TOPAMAX) tablet 100 mg        Note to Pharmacy: PTA Sig:Take 1 tablet (100 mg) by mouth 2 times daily      100 mg Oral 2 TIMES DAILY 04/16/25 0550      04/16/25 0600  cyclobenzaprine (FLEXERIL) tablet 5 mg        Note to Pharmacy: PTA Sig:Take 0.5 tablets (5 mg) by mouth every evening as needed for muscle spasms.      5 mg Oral AT BEDTIME PRN 04/16/25 0550      04/16/25 0600  dicyclomine (BENTYL) capsule 10 mg        Note to Pharmacy: PTA Sig:Take 10 mg by mouth every 6 hours      10 mg Oral EVERY 6 HOURS 04/16/25 0550      04/16/25 0551  oxyCODONE (ROXICODONE) tablet 5 mg         5 mg Oral EVERY 4 HOURS PRN 04/16/25 0551      04/16/25 0551  acetaminophen (TYLENOL) tablet 650 mg         650 mg Oral EVERY 4 HOURS PRN 04/16/25 0551      04/16/25 0550  polyethylene glycol (MIRALAX) Packet 17 g        Note to Pharmacy: PTA Sig:Take 1 packet by mouth 2 times daily as needed for constipation       17 g Oral 2 TIMES DAILY PRN 04/16/25 0550      04/16/25 0550  traMADol (ULTRAM) half-tab 25-50 mg        Note to Pharmacy: PTA Sig:Take 0.5-1 tablets (25-50 mg) by mouth every 6 hours as needed for moderate pain    25-50 mg Oral EVERY 6 HOURS PRN 04/16/25 0550      04/16/25 0550  senna-docusate (SENOKOT-S/PERICOLACE)  "8.6-50 MG per tablet 1 tablet        Placed in \"Or\" Linked Group    1 tablet Oral 2 TIMES DAILY PRN 04/16/25 0550      04/16/25 0550  senna-docusate (SENOKOT-S/PERICOLACE) 8.6-50 MG per tablet 2 tablet        Placed in \"Or\" Linked Group    2 tablet Oral 2 TIMES DAILY PRN 04/16/25 0550      04/16/25 0550  cyclobenzaprine (FLEXERIL) tablet 5 mg        Note to Pharmacy: PTA Sig:Take 1 tablet (5 mg) by mouth 3 times daily as needed for muscle spasms      5 mg Oral 3 TIMES DAILY PRN 04/16/25 0550                Physical Exam:  Vitals: /71 (BP Location: Right arm)   Pulse 75   Temp 98.1  F (36.7  C) (Oral)   Resp 16   Ht 1.575 m (5' 2\")   Wt 45.7 kg (100 lb 12.8 oz)   SpO2 99%   BMI 18.44 kg/m      Physical Exam:     CONSTITUTIONAL/GENERAL APPEARANCE:  NAD  EYES: EOMI, sclera anicteric, PERRLA  ENT/NECK: atraumatic, lips and oral mucous membranes dry  RESPIRATORY: non-labored breathing. No cough, wheeze  CV: HR within normal limits  ABDOMEN: soft, non tender to light tough, gastrostomy present on LUQ  MUSCULOSKELETAL/BACK/SPINE/EXTREMITIES: Moves all extremities purposefully.    NEURO: Alert and Oriented x3. Answers questions appropriately  SKIN/VASCULAR EXAM:  No jaundice, no visible rashes or lesions        Billing on time: 85 minutes spent on chart review, face to face with patient, discuss plan of care with RN and primary service and documenting.       Acute Inpatient Pain Service [facility]  Hours of pain coverage 24/7   Please PAGE via Vocera - Link to Vocera Here - Search Pain  Page via Amcom- Please Page the Pain Team Via Amcom: \"PAIN MANAGEMENT ACUTE INPATIENT/ [facility]\"           "
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
## 2025-04-16 NOTE — ANESTHESIA PREPROCEDURE EVALUATION
Anesthesia Pre-Procedure Evaluation    Patient: Chantell Kidd   MRN: [id number] : 1965        Procedure : Procedure(s):  ESOPHAGOGASTRODUODENOSCOPY, WITH PEGJ TUBE EXCHANGE          Past Medical History:   Diagnosis Date    Chronic abdominal pain     Chronic pancreatitis (H)     S/P pancreatectomy    Depression with anxiety     Gastro-oesophageal reflux disease     Hypothyroidism 2015    Kidney stones     Low serum cortisol level     Migraines     Other chronic pain     STOMACH    Other chronic pain     LUMBAR SPINE    Peripheral neuropathy     Post-pancreatectomy diabetes (H) 2012    TPIAT    Spasm of sphincter of Oddi       Past Surgical History:   Procedure Laterality Date    ARTHROPLASTY CARPOMETACARPAL (THUMB JOINT)  2014    Procedure: ARTHROPLASTY CARPOMETACARPAL (THUMB JOINT);  Surgeon: Carina Panda MD;  Location: [facility]    CHOLECYSTECTOMY  2004    COLONOSCOPY  2014    Procedure: COLONOSCOPY;  Surgeon: Aurora Sahu MD;  [facility]    COLONOSCOPY N/A 2017    Procedure: COLONOSCOPY;  Colonoscopy and upper endoscopy;  Surgeon: Deirdre Harris MD;  [facility]    ENDOSCOPIC RETROGRADE CHOLANGIOPANCREATOGRAM      ENDOSCOPIC RETROGRADE CHOLANGIOPANCREATOGRAM  2011    Procedure:ENDOSCOPIC RETROGRADE CHOLANGIOPANCREATOGRAM; Pancreatic Stent Placement      ENDOSCOPIC RETROGRADE CHOLANGIOPANCREATOGRAM  2011    Procedure:ENDOSCOPIC RETROGRADE CHOLANGIOPANCREATOGRAM; with Pancreatic Stent Removal; Surgeon:DALE MIMS; [facility]    ENDOSCOPY UPPER, COLONOSCOPY, COMBINED  2012    Procedure:COMBINED ENDOSCOPY UPPER, COLONOSCOPY; Enteroscopy with Bile Duct Stent Removal, Colonoscopy  *[facility] Safe Room*; Surgeon:GRACY GODWIN; [facility]    ESOPHAGOSCOPY, GASTROSCOPY, DUODENOSCOPY (EGD), COMBINED  2011    Procedure:COMBINED ESOPHAGOSCOPY, GASTROSCOPY, DUODENOSCOPY (EGD); Surgeon:DALE MIMS;  [facility]    ESOPHAGOSCOPY, GASTROSCOPY, DUODENOSCOPY (EGD), COMBINED N/A 10/30/2014    Procedure: COMBINED ESOPHAGOSCOPY, GASTROSCOPY, DUODENOSCOPY (EGD), BIOPSY SINGLE OR MULTIPLE;  Surgeon: Sarai Moon MD;  [facility]    ESOPHAGOSCOPY, GASTROSCOPY, DUODENOSCOPY (EGD), COMBINED Left 7/6/2015    Procedure: COMBINED ESOPHAGOSCOPY, GASTROSCOPY, DUODENOSCOPY (EGD), BIOPSY SINGLE OR MULTIPLE;  Surgeon: Thomas Estrada MD;  [facility]    ESOPHAGOSCOPY, GASTROSCOPY, DUODENOSCOPY (EGD), COMBINED N/A 7/8/2016    Procedure: COMBINED ESOPHAGOSCOPY, GASTROSCOPY, DUODENOSCOPY (EGD), BIOPSY SINGLE OR MULTIPLE;  Surgeon: Eloy Klein MD;  [facility]    ESOPHAGOSCOPY, GASTROSCOPY, DUODENOSCOPY (EGD), COMBINED N/A 8/4/2016    Procedure: COMBINED ESOPHAGOSCOPY, GASTROSCOPY, DUODENOSCOPY (EGD), BIOPSY SINGLE OR MULTIPLE;  Surgeon: Jason Brown MD;  [facility]    ESOPHAGOSCOPY, GASTROSCOPY, DUODENOSCOPY (EGD), COMBINED N/A 8/1/2017    Procedure: COMBINED ESOPHAGOSCOPY, GASTROSCOPY, DUODENOSCOPY (EGD);;  Surgeon: Deirdre Harris MD;  [facility]    ESOPHAGOSCOPY, GASTROSCOPY, DUODENOSCOPY (EGD), COMBINED N/A 6/12/2019    Procedure: ESOPHAGOGASTRODUODENOSCOPY (EGD);  Surgeon: Jose Francisco Perdomo MD;  Location:  GI    GYN SURGERY      Hysterectomy and USO    [facility] ENDOSCOPY W EUS  7/20/2011    Procedure:COMBINED ENDOSCOPIC ULTRASOUND, ESOPHAGOSCOPY, GASTROSCOPY, DUODENOSCOPY (EGD); Surgeon:DRAVIN DONOHUE; [facility]    HERNIORRHAPHY VENTRAL N/A 9/15/2016    Procedure: HERNIORRHAPHY VENTRAL;  Surgeon: Juanita Bernabe MD;  [facility]    HYSTERECTOMY  1997 or 1998    USO    INCISION AND DRAINAGE ABDOMEN WASHOUT, COMBINED  8/16/2012    Procedure: COMBINED INCISION AND DRAINAGE ABDOMEN WASHOUT;  ,debridement and Drainage Post Appendectomy;  Surgeon: Ron Austin MD;  [facility]    INJECT TRANSVERSUS ABDOMINIS PLANE  (TAP) BLOCK BILATERAL Bilateral 5/26/2016    Procedure: INJECT TRANSVERSUS ABDOMINIS PLANE (TAP) BLOCK BILATERAL;  Surgeon: Leonard Mccallum MD;  [facility]    IR NG TUBE PLACEMENT REQ RAD & FLUORO  12/4/2017    LAPAROSCOPIC APPENDECTOMY  7/30/2012    Procedure: LAPAROSCOPIC APPENDECTOMY;  Open Appendectomy;  Surgeon: Ron Austin MD;  [facility]    PANCREATECTOMY, TRANSPLANT AUTO ISLET CELL, COMBINED  1/6/2012    Procedure:COMBINED PANCREATECTOMY, TRANSPLANT AUTO ISLET CELL; Total  Pancreatectomy, Auto Islet Transplant, splenectomy, 18fr. transgastric-jejunal feeding tube placement, liver biopsy; Surgeon:PALAK LEE; [facility]    REPLACE GASTROSTOMY TUBE, PERCUTANEOUS N/A 8/30/2017    Procedure: REPLACE GASTROSTOMY TUBE, PERCUTANEOUS;  GJ Tube Change;  Surgeon: Jose Nath PA-C;  [facility]    SPLENECTOMY        Allergies   Allergen Reactions    Corticosteroids Other (See Comments)     All oral, IV and injectable steroids are contraindicated (unless in life threatening situations) in Islet Auto transplant recipients. They can cause irreversible loss of islet cell function. Please contact patient's transplant care coordinator YURI Cortez RN at [phone]/pager [phone] and/or endocrinologist prior to administration.      Chocolate Flavoring Agent (Non-Screening) Rash     Breaks out when eats chocolate      Social History     Tobacco Use    Smoking status: Never    Smokeless tobacco: Never   Substance Use Topics    Alcohol use: No     Alcohol/week: 0.0 standard drinks of alcohol      Wt Readings from Last 1 Encounters:   04/16/25 45.7 kg (100 lb 12.8 oz)        Anesthesia Evaluation   Pt has had prior anesthetic. Type: General and MAC.    No history of anesthetic complications       ROS/MED HX  ENT/Pulmonary:  - neg pulmonary ROS     Neurologic:     (+)    peripheral neuropathy,  migraines,                          Cardiovascular:  - neg cardiovascular ROS    (+)  - -   -  - -                                 Previous cardiac testing   Echo: Date: 8/9/2016 Results:  Interpretation Summary  Left ventricular function, chamber size, wall motion, and wall thickness are  normal.The EF is 55-60%. Traced at 58%.  Right ventricular function, chamber size, wall motion, and thickness are  normal.  No signs of CHF.    Stress Test:  Date: Results:    ECG Reviewed:  Date: 11/8/2022 Results:  SR 80BPM  Cath:  Date: Results:      METS/Exercise Tolerance:     Hematologic:       Musculoskeletal:       GI/Hepatic: Comment: Chronic pancreatitis   Malnutrition    (+) GERD, Asymptomatic on medication,                  Renal/Genitourinary:     (+) renal disease,      Nephrolithiasis ,       Endo:     (+) type I DM,  Last HgA1c: 19.1, date: 4/15/25, Using insulin,   Previously admitted for DM/DKA.  thyroid problem, hypothyroidism, Chronic steroid usage for Other. Date most recently used: Adrenal Insufficiency.        Psychiatric/Substance Use:     (+) psychiatric history anxiety and depression       Infectious Disease:  - neg infectious disease ROS     Malignancy:  - neg malignancy ROS     Other:      (+)  , H/O Chronic Pain,         Physical Exam    Airway        Mallampati: II   TM distance: > 3 FB   Neck ROM: full   Mouth opening: > 3 cm    Respiratory Devices and Support         Dental       (+) Modest Abnormalities - crowns, retainers, 1 or 2 missing teeth      Cardiovascular   cardiovascular exam normal       Rhythm and rate: regular and normal     Pulmonary   pulmonary exam normal        breath sounds clear to auscultation           OUTSIDE LABS:  CBC:   Lab Results   Component Value Date    WBC 10.0 04/15/2025    WBC 5.4 04/03/2025    HGB 11.9 04/15/2025    HGB 13.0 04/03/2025    HCT 33.4 (L) 04/15/2025    HCT 36.8 04/03/2025     04/15/2025     04/03/2025     BMP:   Lab Results   Component Value Date     (L) 04/15/2025     (L) 04/03/2025    POTASSIUM 3.6  04/15/2025    POTASSIUM 3.8 04/03/2025    CHLORIDE 85 (L) 04/15/2025    CHLORIDE 83 (L) 04/03/2025    CO2 23 04/15/2025    CO2 22 04/03/2025    BUN 6.1 (L) 04/15/2025    BUN 4.2 (L) 04/03/2025    CR 0.35 (L) 04/15/2025    CR 0.30 (L) 04/03/2025     (H) 04/16/2025     (H) 04/16/2025     COAGS:   Lab Results   Component Value Date    PTT 25 11/09/2020    INR 1.06 11/12/2020    FIBR 277 11/12/2020     POC:   Lab Results   Component Value Date     (H) 11/12/2020    HCG Negative 06/10/2019     HEPATIC:   Lab Results   Component Value Date    ALBUMIN 3.9 04/15/2025    PROTTOTAL 6.2 (L) 04/15/2025     (H) 04/15/2025     (H) 04/15/2025    ALKPHOS 186 (H) 04/15/2025    BILITOTAL 0.3 04/15/2025     OTHER:   Lab Results   Component Value Date    PH 7.40 01/06/2012    LACT 1.1 04/15/2025    A1C 19.1 (H) 04/15/2025    ELIZA 8.9 04/15/2025    PHOS 3.1 01/23/2025    MAG 2.2 11/21/2019    LIPASE 7 (L) 04/15/2025    AMYLASE 27 (L) 03/31/2015    TSH 4.49 (H) 01/23/2025    T4 1.10 01/23/2025    CRP 3.8 11/12/2020    SED 13 09/18/2015       Anesthesia Plan    ASA Status:  3    NPO Status:  NPO Appropriate    Anesthesia Type: General.     - Airway: Native airway      Maintenance: TIVA.   Techniques and Equipment:     - Lines/Monitors: BIS, NIRS     Consents    Anesthesia Plan(s) and associated risks, benefits, and realistic alternatives discussed. Questions answered and patient/representative(s) expressed understanding.     - Discussed: Risks, Benefits and Alternatives for the PROCEDURE were discussed     - Discussed with:  Patient      - Extended Intubation/Ventilatory Support Discussed: No.      - Patient is DNR/DNI Status: No          Postoperative Care    Pain management: IV analgesics, Oral pain medications, Multi-modal analgesia.   PONV prophylaxis: Ondansetron (or other 5HT-3), Background Propofol Infusion     Comments:               Isauro Rhodes MD    Clinically Significant Risk Factors Present  on Admission         # Hyponatremia: Lowest Na = 123 mmol/L in last 2 days, will monitor as appropriate  # Hypochloremia: Lowest Cl = 85 mmol/L in last 2 days, will monitor as appropriate                     # Severe Malnutrition: based on nutrition assessment and treatment provided per dietitian's recommendations.

## 2025-04-17 ENCOUNTER — APPOINTMENT (OUTPATIENT)
Dept: INTERVENTIONAL RADIOLOGY/VASCULAR | Facility: CLINIC | Age: 60
End: 2025-04-17
Attending: PEDIATRICS
Payer: MEDICARE

## 2025-04-17 ENCOUNTER — ANESTHESIA (OUTPATIENT)
Dept: SURGERY | Facility: CLINIC | Age: 60
End: 2025-04-17
Payer: MEDICARE

## 2025-04-17 ENCOUNTER — ENROLLMENT (OUTPATIENT)
Dept: HOME HEALTH SERVICES | Facility: HOME HEALTH | Age: 60
End: 2025-04-17
Payer: MEDICARE

## 2025-04-17 ENCOUNTER — OFFICE VISIT (OUTPATIENT)
Dept: INTERNAL MEDICINE | Facility: CLINIC | Age: 60
End: 2025-04-17
Payer: MEDICARE

## 2025-04-17 DIAGNOSIS — E10.649 TYPE 1 DIABETES MELLITUS WITH HYPOGLYCEMIA AND WITHOUT COMA (H): Primary | ICD-10-CM

## 2025-04-17 LAB
ANION GAP SERPL CALCULATED.3IONS-SCNC: 8 MMOL/L (ref 7–15)
B-OH-BUTYR SERPL-SCNC: <0.18 MMOL/L
BUN SERPL-MCNC: 5.3 MG/DL (ref 8–23)
CALCIUM SERPL-MCNC: 8.5 MG/DL (ref 8.8–10.4)
CHLORIDE SERPL-SCNC: 103 MMOL/L (ref 98–107)
CREAT SERPL-MCNC: 0.4 MG/DL (ref 0.51–0.95)
EGFRCR SERPLBLD CKD-EPI 2021: >90 ML/MIN/1.73M2
GLUCOSE BLDC GLUCOMTR-MCNC: 107 MG/DL (ref 70–99)
GLUCOSE BLDC GLUCOMTR-MCNC: 107 MG/DL (ref 70–99)
GLUCOSE BLDC GLUCOMTR-MCNC: 134 MG/DL (ref 70–99)
GLUCOSE BLDC GLUCOMTR-MCNC: 135 MG/DL (ref 70–99)
GLUCOSE BLDC GLUCOMTR-MCNC: 137 MG/DL (ref 70–99)
GLUCOSE BLDC GLUCOMTR-MCNC: 137 MG/DL (ref 70–99)
GLUCOSE BLDC GLUCOMTR-MCNC: 165 MG/DL (ref 70–99)
GLUCOSE BLDC GLUCOMTR-MCNC: 175 MG/DL (ref 70–99)
GLUCOSE BLDC GLUCOMTR-MCNC: 184 MG/DL (ref 70–99)
GLUCOSE BLDC GLUCOMTR-MCNC: 202 MG/DL (ref 70–99)
GLUCOSE BLDC GLUCOMTR-MCNC: 230 MG/DL (ref 70–99)
GLUCOSE BLDC GLUCOMTR-MCNC: 238 MG/DL (ref 70–99)
GLUCOSE BLDC GLUCOMTR-MCNC: 259 MG/DL (ref 70–99)
GLUCOSE BLDC GLUCOMTR-MCNC: 281 MG/DL (ref 70–99)
GLUCOSE BLDC GLUCOMTR-MCNC: 51 MG/DL (ref 70–99)
GLUCOSE BLDC GLUCOMTR-MCNC: 57 MG/DL (ref 70–99)
GLUCOSE BLDC GLUCOMTR-MCNC: 84 MG/DL (ref 70–99)
GLUCOSE BLDC GLUCOMTR-MCNC: 88 MG/DL (ref 70–99)
GLUCOSE SERPL-MCNC: 100 MG/DL (ref 70–99)
HCO3 SERPL-SCNC: 27 MMOL/L (ref 22–29)
MAGNESIUM SERPL-MCNC: 3.7 MG/DL (ref 1.7–2.3)
PHOSPHATE SERPL-MCNC: 2.5 MG/DL (ref 2.5–4.5)
POTASSIUM SERPL-SCNC: 3.5 MMOL/L (ref 3.4–5.3)
SODIUM SERPL-SCNC: 138 MMOL/L (ref 135–145)
UPPER GI ENDOSCOPY: NORMAL

## 2025-04-17 PROCEDURE — C1769 GUIDE WIRE: HCPCS | Performed by: INTERNAL MEDICINE

## 2025-04-17 PROCEDURE — 250N000013 HC RX MED GY IP 250 OP 250 PS 637: Performed by: INTERNAL MEDICINE

## 2025-04-17 PROCEDURE — 250N000011 HC RX IP 250 OP 636: Performed by: NURSE ANESTHETIST, CERTIFIED REGISTERED

## 2025-04-17 PROCEDURE — 82010 KETONE BODYS QUAN: CPT | Performed by: PEDIATRICS

## 2025-04-17 PROCEDURE — 250N000013 HC RX MED GY IP 250 OP 250 PS 637

## 2025-04-17 PROCEDURE — 83735 ASSAY OF MAGNESIUM: CPT | Performed by: PEDIATRICS

## 2025-04-17 PROCEDURE — 999N000141 HC STATISTIC PRE-PROCEDURE NURSING ASSESSMENT: Performed by: INTERNAL MEDICINE

## 2025-04-17 PROCEDURE — 80048 BASIC METABOLIC PNL TOTAL CA: CPT | Performed by: PEDIATRICS

## 2025-04-17 PROCEDURE — 250N000012 HC RX MED GY IP 250 OP 636 PS 637: Performed by: INTERNAL MEDICINE

## 2025-04-17 PROCEDURE — 999N000179 XR SURGERY CARM FLUORO LESS THAN 5 MIN W STILLS

## 2025-04-17 PROCEDURE — 258N000001 HC RX 258

## 2025-04-17 PROCEDURE — 255N000002 HC RX 255 OP 636: Performed by: INTERNAL MEDICINE

## 2025-04-17 PROCEDURE — 250N000011 HC RX IP 250 OP 636: Mod: JZ | Performed by: ANESTHESIOLOGY

## 2025-04-17 PROCEDURE — 360N000082 HC SURGERY LEVEL 2 W/ FLUORO, PER MIN: Performed by: INTERNAL MEDICINE

## 2025-04-17 PROCEDURE — 250N000009 HC RX 250: Performed by: INTERNAL MEDICINE

## 2025-04-17 PROCEDURE — 0DP08DZ REMOVAL OF INTRALUMINAL DEVICE FROM UPPER INTESTINAL TRACT, VIA NATURAL OR ARTIFICIAL OPENING ENDOSCOPIC: ICD-10-PCS | Performed by: INTERNAL MEDICINE

## 2025-04-17 PROCEDURE — 370N000017 HC ANESTHESIA TECHNICAL FEE, PER MIN: Performed by: INTERNAL MEDICINE

## 2025-04-17 PROCEDURE — 99207 PR SC NO CHARGE VISIT/PATIENT NOT SEEN: CPT

## 2025-04-17 PROCEDURE — 272N000001 HC OR GENERAL SUPPLY STERILE: Performed by: INTERNAL MEDICINE

## 2025-04-17 PROCEDURE — 258N000001 HC RX 258: Performed by: NURSE PRACTITIONER

## 2025-04-17 PROCEDURE — 36415 COLL VENOUS BLD VENIPUNCTURE: CPT | Performed by: PEDIATRICS

## 2025-04-17 PROCEDURE — 99233 SBSQ HOSP IP/OBS HIGH 50: CPT

## 2025-04-17 PROCEDURE — 84100 ASSAY OF PHOSPHORUS: CPT | Performed by: PEDIATRICS

## 2025-04-17 PROCEDURE — 258N000003 HC RX IP 258 OP 636: Performed by: NURSE ANESTHETIST, CERTIFIED REGISTERED

## 2025-04-17 PROCEDURE — 120N000002 HC R&B MED SURG/OB UMMC

## 2025-04-17 PROCEDURE — 0DHA7UZ INSERTION OF FEEDING DEVICE INTO JEJUNUM, VIA NATURAL OR ARTIFICIAL OPENING: ICD-10-PCS | Performed by: INTERNAL MEDICINE

## 2025-04-17 PROCEDURE — 250N000009 HC RX 250: Performed by: NURSE ANESTHETIST, CERTIFIED REGISTERED

## 2025-04-17 PROCEDURE — 710N000010 HC RECOVERY PHASE 1, LEVEL 2, PER MIN: Performed by: INTERNAL MEDICINE

## 2025-04-17 RX ORDER — FENTANYL CITRATE 50 UG/ML
25 INJECTION, SOLUTION INTRAMUSCULAR; INTRAVENOUS EVERY 5 MIN PRN
Status: DISCONTINUED | OUTPATIENT
Start: 2025-04-17 | End: 2025-04-17 | Stop reason: HOSPADM

## 2025-04-17 RX ORDER — DEXTROSE MONOHYDRATE 100 MG/ML
INJECTION, SOLUTION INTRAVENOUS CONTINUOUS
Status: DISCONTINUED | OUTPATIENT
Start: 2025-04-17 | End: 2025-04-18

## 2025-04-17 RX ORDER — HALOPERIDOL 5 MG/ML
1 INJECTION INTRAMUSCULAR
Status: DISCONTINUED | OUTPATIENT
Start: 2025-04-17 | End: 2025-04-17 | Stop reason: HOSPADM

## 2025-04-17 RX ORDER — ONDANSETRON 2 MG/ML
INJECTION INTRAMUSCULAR; INTRAVENOUS PRN
Status: DISCONTINUED | OUTPATIENT
Start: 2025-04-17 | End: 2025-04-17

## 2025-04-17 RX ORDER — SODIUM CHLORIDE, SODIUM LACTATE, POTASSIUM CHLORIDE, CALCIUM CHLORIDE 600; 310; 30; 20 MG/100ML; MG/100ML; MG/100ML; MG/100ML
INJECTION, SOLUTION INTRAVENOUS CONTINUOUS PRN
Status: DISCONTINUED | OUTPATIENT
Start: 2025-04-17 | End: 2025-04-17

## 2025-04-17 RX ORDER — HYDROMORPHONE HCL IN WATER/PF 6 MG/30 ML
0.2 PATIENT CONTROLLED ANALGESIA SYRINGE INTRAVENOUS EVERY 5 MIN PRN
Status: DISCONTINUED | OUTPATIENT
Start: 2025-04-17 | End: 2025-04-17 | Stop reason: HOSPADM

## 2025-04-17 RX ORDER — PROPOFOL 10 MG/ML
INJECTION, EMULSION INTRAVENOUS CONTINUOUS PRN
Status: DISCONTINUED | OUTPATIENT
Start: 2025-04-17 | End: 2025-04-17

## 2025-04-17 RX ORDER — NALOXONE HYDROCHLORIDE 0.4 MG/ML
0.1 INJECTION, SOLUTION INTRAMUSCULAR; INTRAVENOUS; SUBCUTANEOUS
Status: DISCONTINUED | OUTPATIENT
Start: 2025-04-17 | End: 2025-04-17 | Stop reason: HOSPADM

## 2025-04-17 RX ORDER — IOPAMIDOL 510 MG/ML
INJECTION, SOLUTION INTRAVASCULAR PRN
Status: DISCONTINUED | OUTPATIENT
Start: 2025-04-17 | End: 2025-04-17 | Stop reason: HOSPADM

## 2025-04-17 RX ORDER — HYDROMORPHONE HCL IN WATER/PF 6 MG/30 ML
0.4 PATIENT CONTROLLED ANALGESIA SYRINGE INTRAVENOUS EVERY 5 MIN PRN
Status: DISCONTINUED | OUTPATIENT
Start: 2025-04-17 | End: 2025-04-17 | Stop reason: HOSPADM

## 2025-04-17 RX ORDER — LIDOCAINE HYDROCHLORIDE 20 MG/ML
INJECTION, SOLUTION INFILTRATION; PERINEURAL PRN
Status: DISCONTINUED | OUTPATIENT
Start: 2025-04-17 | End: 2025-04-17

## 2025-04-17 RX ORDER — SODIUM CHLORIDE, SODIUM LACTATE, POTASSIUM CHLORIDE, CALCIUM CHLORIDE 600; 310; 30; 20 MG/100ML; MG/100ML; MG/100ML; MG/100ML
INJECTION, SOLUTION INTRAVENOUS CONTINUOUS
Status: DISCONTINUED | OUTPATIENT
Start: 2025-04-17 | End: 2025-04-17 | Stop reason: HOSPADM

## 2025-04-17 RX ORDER — FENTANYL CITRATE 50 UG/ML
50 INJECTION, SOLUTION INTRAMUSCULAR; INTRAVENOUS EVERY 5 MIN PRN
Status: DISCONTINUED | OUTPATIENT
Start: 2025-04-17 | End: 2025-04-17 | Stop reason: HOSPADM

## 2025-04-17 RX ORDER — ONDANSETRON 2 MG/ML
4 INJECTION INTRAMUSCULAR; INTRAVENOUS EVERY 30 MIN PRN
Status: DISCONTINUED | OUTPATIENT
Start: 2025-04-17 | End: 2025-04-17 | Stop reason: HOSPADM

## 2025-04-17 RX ORDER — ONDANSETRON 4 MG/1
4 TABLET, ORALLY DISINTEGRATING ORAL EVERY 30 MIN PRN
Status: DISCONTINUED | OUTPATIENT
Start: 2025-04-17 | End: 2025-04-17 | Stop reason: HOSPADM

## 2025-04-17 RX ADMIN — HYDROMORPHONE HYDROCHLORIDE 0.2 MG: 0.2 INJECTION, SOLUTION INTRAMUSCULAR; INTRAVENOUS; SUBCUTANEOUS at 12:35

## 2025-04-17 RX ADMIN — HYDROCORTISONE 10 MG: 10 TABLET ORAL at 08:10

## 2025-04-17 RX ADMIN — POLYETHYLENE GLYCOL 3350 17 G: 17 POWDER, FOR SOLUTION ORAL at 08:07

## 2025-04-17 RX ADMIN — GABAPENTIN 100 MG: 100 CAPSULE ORAL at 14:58

## 2025-04-17 RX ADMIN — PANTOPRAZOLE SODIUM 40 MG: 40 TABLET, DELAYED RELEASE ORAL at 19:57

## 2025-04-17 RX ADMIN — INSULIN ASPART 7 UNITS: 100 INJECTION, SOLUTION INTRAVENOUS; SUBCUTANEOUS at 20:12

## 2025-04-17 RX ADMIN — SODIUM CHLORIDE, SODIUM LACTATE, POTASSIUM CHLORIDE, AND CALCIUM CHLORIDE: .6; .31; .03; .02 INJECTION, SOLUTION INTRAVENOUS at 10:41

## 2025-04-17 RX ADMIN — DEXTROSE MONOHYDRATE 50 ML: 25 INJECTION, SOLUTION INTRAVENOUS at 07:59

## 2025-04-17 RX ADMIN — GABAPENTIN 100 MG: 100 CAPSULE ORAL at 19:57

## 2025-04-17 RX ADMIN — FAMOTIDINE 20 MG: 20 TABLET, FILM COATED ORAL at 22:02

## 2025-04-17 RX ADMIN — PANTOPRAZOLE SODIUM 40 MG: 40 TABLET, DELAYED RELEASE ORAL at 08:10

## 2025-04-17 RX ADMIN — DEXTROSE MONOHYDRATE 25 ML: 25 INJECTION, SOLUTION INTRAVENOUS at 03:49

## 2025-04-17 RX ADMIN — Medication 15 ML: at 08:13

## 2025-04-17 RX ADMIN — LINACLOTIDE 145 MCG: 145 CAPSULE, GELATIN COATED ORAL at 08:19

## 2025-04-17 RX ADMIN — METOCLOPRAMIDE 10 MG: 5 TABLET ORAL at 19:57

## 2025-04-17 RX ADMIN — DULOXETINE 90 MG: 60 CAPSULE, DELAYED RELEASE ORAL at 08:09

## 2025-04-17 RX ADMIN — HYDROCORTISONE 15 MG: 10 TABLET ORAL at 22:02

## 2025-04-17 RX ADMIN — DICYCLOMINE HYDROCHLORIDE 10 MG: 10 CAPSULE ORAL at 05:59

## 2025-04-17 RX ADMIN — METOCLOPRAMIDE 10 MG: 5 TABLET ORAL at 14:57

## 2025-04-17 RX ADMIN — TRAZODONE HYDROCHLORIDE 100 MG: 100 TABLET ORAL at 22:02

## 2025-04-17 RX ADMIN — SUCRALFATE 1 G: 1 TABLET ORAL at 08:11

## 2025-04-17 RX ADMIN — THIAMINE HCL TAB 100 MG 100 MG: 100 TAB at 08:14

## 2025-04-17 RX ADMIN — HYDROMORPHONE HYDROCHLORIDE 0.2 MG: 0.2 INJECTION, SOLUTION INTRAMUSCULAR; INTRAVENOUS; SUBCUTANEOUS at 12:17

## 2025-04-17 RX ADMIN — TOPIRAMATE 100 MG: 50 TABLET, FILM COATED ORAL at 08:10

## 2025-04-17 RX ADMIN — GABAPENTIN 100 MG: 100 CAPSULE ORAL at 08:08

## 2025-04-17 RX ADMIN — ACETAMINOPHEN 975 MG: 325 TABLET, FILM COATED ORAL at 08:13

## 2025-04-17 RX ADMIN — ONDANSETRON 4 MG: 2 INJECTION INTRAMUSCULAR; INTRAVENOUS at 10:41

## 2025-04-17 RX ADMIN — SUCRALFATE 1 G: 1 TABLET ORAL at 22:02

## 2025-04-17 RX ADMIN — PROPOFOL 100 MCG/KG/MIN: 10 INJECTION, EMULSION INTRAVENOUS at 10:41

## 2025-04-17 RX ADMIN — POTASSIUM CHLORIDE 20 MEQ: 750 TABLET, EXTENDED RELEASE ORAL at 08:11

## 2025-04-17 RX ADMIN — SUCRALFATE 1 G: 1 TABLET ORAL at 16:40

## 2025-04-17 RX ADMIN — PANCRELIPASE 7 CAPSULE: 60000; 12000; 38000 CAPSULE, DELAYED RELEASE PELLETS ORAL at 18:30

## 2025-04-17 RX ADMIN — FENTANYL CITRATE 25 MCG: 50 INJECTION, SOLUTION INTRAMUSCULAR; INTRAVENOUS at 12:05

## 2025-04-17 RX ADMIN — TOPIRAMATE 100 MG: 50 TABLET, FILM COATED ORAL at 19:57

## 2025-04-17 RX ADMIN — HYDROMORPHONE HYDROCHLORIDE 0.2 MG: 0.2 INJECTION, SOLUTION INTRAMUSCULAR; INTRAVENOUS; SUBCUTANEOUS at 12:24

## 2025-04-17 RX ADMIN — LEVOTHYROXINE SODIUM 112 MCG: 0.11 TABLET ORAL at 08:12

## 2025-04-17 RX ADMIN — INSULIN HUMAN 1.5 UNITS/HR: 1 INJECTION, SOLUTION INTRAVENOUS at 16:52

## 2025-04-17 RX ADMIN — DEXTROSE: 10 SOLUTION INTRAVENOUS at 08:04

## 2025-04-17 RX ADMIN — LIDOCAINE HYDROCHLORIDE 40 MG: 20 INJECTION, SOLUTION INFILTRATION; PERINEURAL at 10:41

## 2025-04-17 RX ADMIN — METOCLOPRAMIDE 10 MG: 5 TABLET ORAL at 08:11

## 2025-04-17 RX ADMIN — ACETAMINOPHEN 975 MG: 325 TABLET, FILM COATED ORAL at 14:58

## 2025-04-17 RX ADMIN — OXYCODONE HYDROCHLORIDE 5 MG: 5 TABLET ORAL at 05:59

## 2025-04-17 RX ADMIN — DICYCLOMINE HYDROCHLORIDE 10 MG: 10 CAPSULE ORAL at 18:15

## 2025-04-17 RX ADMIN — OXYCODONE HYDROCHLORIDE 5 MG: 5 TABLET ORAL at 20:12

## 2025-04-17 RX ADMIN — MIDAZOLAM 2 MG: 1 INJECTION INTRAMUSCULAR; INTRAVENOUS at 10:37

## 2025-04-17 ASSESSMENT — ACTIVITIES OF DAILY LIVING (ADL)
ADLS_ACUITY_SCORE: 57
ADLS_ACUITY_SCORE: 53
ADLS_ACUITY_SCORE: 58
ADLS_ACUITY_SCORE: 53
ADLS_ACUITY_SCORE: 58
ADLS_ACUITY_SCORE: 53
ADLS_ACUITY_SCORE: 58
ADLS_ACUITY_SCORE: 53
ADLS_ACUITY_SCORE: 53
ADLS_ACUITY_SCORE: 58
ADLS_ACUITY_SCORE: 58
ADLS_ACUITY_SCORE: 53
ADLS_ACUITY_SCORE: 57
ADLS_ACUITY_SCORE: 58
ADLS_ACUITY_SCORE: 58
ADLS_ACUITY_SCORE: 53
ADLS_ACUITY_SCORE: 58
ADLS_ACUITY_SCORE: 53
DEPENDENT_IADLS:: CLEANING;LAUNDRY;SHOPPING;TRANSPORTATION;MEDICATION MANAGEMENT
ADLS_ACUITY_SCORE: 58

## 2025-04-17 NOTE — ANESTHESIA POSTPROCEDURE EVALUATION
Patient: Chantell Kidd    Procedure: Procedure(s):  Replacement of Gastrojejunostomy Tube using Endoscopy       Anesthesia Type:  General    Note:  Disposition: Inpatient   Postop Pain Control: Uneventful            Sign Out: Well controlled pain   PONV: No   Neuro/Psych: Uneventful            Sign Out: Acceptable/Baseline neuro status   Airway/Respiratory: Uneventful            Sign Out: Acceptable/Baseline resp. status   CV/Hemodynamics: Uneventful            Sign Out: Acceptable CV status; No obvious hypovolemia; No obvious fluid overload   Other NRE: NONE   DID A NON-ROUTINE EVENT OCCUR? No           Last vitals:  Vitals Value Taken Time   BP 89/61 04/17/25 1215   Temp 36.1  C (97  F) 04/17/25 1115   Pulse 70 04/17/25 1229   Resp 21 04/17/25 1229   SpO2 96 % 04/17/25 1229   Vitals shown include unfiled device data.    Electronically Signed By: Oj Raya MD  April 17, 2025  12:30 PM

## 2025-04-17 NOTE — PROGRESS NOTES
"[facility]    Medicine Progress Note - Hospitalist Service    Date of Admission:  4/15/2025    Assessment & Plan    Patient is a 59-year-old female admitted on 4/15/2025 with a significant past medical history of insulin-dependent diabetes mellitus after pancreatectomy, chronic pancreatitis, migraine, hypothyroidism, and G tube nutrition who presented to the ED with a displaced  tube and was admitted for IR replacement of the G tube.     Updates Today:  - PEG-J exchange vs separate PEG and PEJ on 4/17 with the GI team today   - pain seems to be better controlled today, says she is \"sore\"   - follow updated endocrine recs -> continue insulin drip, continue CHO coverage, start lantus 9 units daily   - follow updated GI recs     PEG tube displacement   Bloating  Patient reports that her G tube became dislodged approximately one day prior to admission with an audible pop. She notes distention of her abdomen with bloating and pain. IR was consulted for GJ replacement however their team is unable to replace GJ tube d/t need for general anesthesia and challenging exchange.   - general surgery consulted, appreciate assistance   - GI luminal consulted, appreciate assistance    - PEG-J exchange vs separate PEG and PEJ on 4/17    Addendum @ 11:18 AM  PEG-J replaced by GI team today without complications noted. Okay to use tube without delay; G-tube for meds and venting and J-tube for feeds/flushes      Post-pancreatectomy diabetes (type 1)  TPAIT (2012)  She was previously seen outpatient by both endocrinology and PCP. Her A1c on 1/23 was extremely high at 17.1, repeat A1c 19.1% on 4/15/25. Her glucose values have ranged in the 400s to 900s outpatient. She was seen by endocrinology, who report that it is unlikely she is taking her dose of insulin at this A1c and recommended exchange for new insulin. On admission, glucose was in the 500s, though patient had no signs of DKA. Per " assessment of her endocrinologist, this is likely part of the cause of her malnutrition. Started on insulin gtt.   - endocrinology consulted, appreciate assistance   - continue insulin drip   - start lantus 9 units   - novolog CHO coverage: 1 unit per 20g CHO   - hold PTA insulin pump and CGMS while on insulin gtt  - continue PTA Creon      Acute on chronic pain  Patient reports severe pain in her abdomen and back. She reports that she has previously taken methadone for this, but no record of methadone later than 2010 could be located in her chart. CT A/P on admission unremarkable. Large amount of discrepancy regarding what her pain regimen was prior to admission, I.e. shared that she was taking methadone 20 mg 4x/day PRN for pain as well as oxycodone? National  for the past year does show prescriptions for methadone or oxycodone at all or consistently.   - pain team consulted, appreciate assistance (signed off 4/16)               - acetaminophen 975 mg PO q8h              - flexeril 5 mg PO TID PRN              - oxycodone 5 mg PO q4h PRN while inpatient while workup ongoing                           - please provide very short tapering script upon discharge               - consider gabapentin 100 mg TID               - lidocaine patches 1-3 patches               - menthol patches, 1 patch q8h               - bowel regimen   - pharmacy consulted for med rec, appreciate assistance      Malnutrition, severe  Cachexia   Patient reports > 30 lb weight loss in the last 6 months. Per endocrinology notes, this is likely multifactorial due to GI issues and uncontrolled diabetes, chronic abdominal pain   - nutrition consulted, appreciate assistance   - continue PTA tube feeds      Hx of adrenal insufficiency   Followed by Dr. Maher. Previously suppressed AM cortisol and has been on empiric therapy for possible adrenal insufficiency, unclear if central vs transient. Most recent clinic note describes inability to wean  steroids due to hypoglycemia episodes.   - continue PTA hydrocortisone      Concern for malignancy  Elevated CEA   Patient was seen and evaluated by PCP with a stat CTCAP to assess for malignancy on 1/23. This scan was negative. She was scheduled for follow up with her PCP but has yet to follow up with her PCP. She notes elevated CEA since she was in [place] but has not had a workup showing any cancer at this point however tells me that she is working with her PCP on next steps.   - continue follow-up with PCP      Discharge planning:  - place referral to [facility] clinic   - follow up with PCP regarding elevated CEA   - [facility] Endocrinology currently scheduled for 5/13/25 and 8/21/25          Diet: Adult Formula Drip Feeding: Continuous [place] Peptide 1.5; Jejunostomy; Goal Rate: 45; mL/hr; Initiate @ 10 ml/hr and advance by 10 ml Q8H pending pt's tolerance.; Do not advance tube feeding rate unless K+ is = or > 3.0, Mg++ is = or > 1.5, a...  NPO for Procedure/Surgery per Anesthesia Guidelines Except for: Meds; Clear liquids before procedure/surgery: ADULT (Age GREATER than or Equal to 18 years) - Clear liquids 2 hours before procedure/surgery    DVT Prophylaxis: Pneumatic Compression Devices  Mckee Catheter: Not present  Lines: None     Cardiac Monitoring: None  Code Status: Full Code      Clinically Significant Risk Factors Present on Admission         # Hyponatremia: Lowest Na = 123 mmol/L in last 2 days, will monitor as appropriate  # Hypochloremia: Lowest Cl = 85 mmol/L in last 2 days, will monitor as appropriate  # Hypocalcemia: Lowest Ca = 8.5 mg/dL in last 2 days, will monitor and replace as appropriate                    # Severe Malnutrition: based on nutrition assessment and treatment provided per dietitian's recommendations.            Social Drivers of Health    Food Insecurity: High Risk (4/16/2025)    Food Insecurity     Within the past 12 months, did you worry that your food would run out  "before you got money to buy more?: Yes     Within the past 12 months, did the food you bought just not last and you didn t have money to get more?: Yes   Depression: At risk (1/23/2025)    PHQ-2     PHQ-2 Score: 6          Disposition Plan     Medically Ready for Discharge: Anticipated in 2-4 Days           The patient's care was discussed with the Attending Physician, Dr. Rosario, Bedside Nurse, Patient, and GI Consultant(s).    Jahaira Ivey NP  Hospitalist Service  [facility]  Securely message with [employer] (more info)  Text page via [facility] Paging/Directory   ______________________________________________________________________    Interval History   Chantell was seen resting in bed, RN at bedside. No acute events overnight. Says that her pain is okay, feels \"sore\" today. Discuss plan for exchange GJ tube with the GI team today and potentially start tube feedings pending placement; verbalized understanding. All questions and concerns addressed at this time.     Physical Exam   Vital Signs: Temp: 97.4  F (36.3  C) Temp src: Oral BP: 95/64 Pulse: 65   Resp: 16 SpO2: 100 % O2 Device: None (Room air)    Weight: 102 lbs 4.7 oz    GENERAL: Alert and awake. Answering questions appropriately. Oriented x 3. NAD. Pleasant and conversational   HEENT: Anicteric sclera. EOMI. Mucous membranes moist   CARDIOVASCULAR: RRR. S1, S2. No murmurs, rubs, or gallops.   RESPIRATORY: Effort normal on RA. Clear to auscultation bilaterally, no rales, rhonchi or wheezes  GI: Abdomen soft, tender to palpation. Mckee in PEGJ site.   MUSCULOSKELETAL: Moves all extremities.   EXTREMITIES: No peripheral edema. No calf asymmetry, erythema, or tenderness.   NEUROLOGICAL: No focal deficits. Moving all extremities symmetrically.   SKIN: Intact. Warm and dry. No jaundice. No rashes on exposed skin     Medical Decision Making       60 MINUTES SPENT BY ME on the date of service doing chart review, history, " exam, documentation & further activities per the note.      Data   Imaging results reviewed over the past 24 hrs:   No results found for this or any previous visit (from the past 24 hours).  Recent Labs   Lab 04/17/25  0636 04/17/25  0600 04/17/25  0527 04/15/25  2354 04/15/25  2130   WBC  --   --   --   --  10.0   HGB  --   --   --   --  11.9   MCV  --   --   --   --  88   PLT  --   --   --   --  330   NA  --  138  --   --  123*   POTASSIUM  --  3.5  --   --  3.6   CHLORIDE  --  103  --   --  85*   CO2  --  27  --   --  23   BUN  --  5.3*  --   --  6.1*   CR  --  0.40*  --   --  0.35*   ANIONGAP  --  8  --   --  15   ELIZA  --  8.5*  --   --  8.9   GLC 84 100* 107*   < > 918*   ALBUMIN  --   --   --   --  3.9   PROTTOTAL  --   --   --   --  6.2*   BILITOTAL  --   --   --   --  0.3   ALKPHOS  --   --   --   --  186*   ALT  --   --   --   --  140*   AST  --   --   --   --  213*   LIPASE  --   --   --   --  7*    < > = values in this interval not displayed.

## 2025-04-17 NOTE — ANESTHESIA CARE TRANSFER NOTE
Patient: Chantell Kidd    Procedure: Procedure(s):  Replacement of Gastrojejunostomy Tube using Endoscopy       Diagnosis: Encounter for feeding tube placement [Z46.59]  Diagnosis Additional Information: No value filed.    Anesthesia Type:   General     Note:    Oropharynx: oropharynx clear of all foreign objects and spontaneously breathing  Level of Consciousness: drowsy  Oxygen Supplementation: face mask  Level of Supplemental Oxygen (L/min / FiO2): 8  Independent Airway: airway patency satisfactory and stable  Dentition: dentition unchanged  Vital Signs Stable: post-procedure vital signs reviewed and stable  Report to RN Given: handoff report given  Patient transferred to: PACU  Comments: Pt recovering from her general anesthetic with a native airway. Pt VSS upon arrival to the PACU. Pt has no c/o pain/N/V. Pt care report given to receiving RN.   Handoff Report: Identifed the Patient, Identified the Reponsible Provider, Reviewed the pertinent medical history, Discussed the surgical course, Reviewed Intra-OP anesthesia mangement and issues during anesthesia, Set expectations for post-procedure period and Allowed opportunity for questions and acknowledgement of understanding      Vitals:  Vitals Value Taken Time   BP 86/62 04/17/25 1115   Temp     Pulse 70 04/17/25 1115   Resp 11 04/17/25 1115   SpO2 100 % 04/17/25 1115   Vitals shown include unfiled device data.    Electronically Signed By: BEE Mix CRNA  April 17, 2025  11:16 AM

## 2025-04-17 NOTE — PLAN OF CARE
Goal Outcome Evaluation:      Plan of Care Reviewed With: patient    Overall Patient Progress: no change    Neuros:  A&Ox4, denies dizziness  Vitals: Vitally stable, Afebrile  Pain: abdominal pain given oxycodone  Resp: Lung sounds clear, on room air with no SOB noted.    GI: Bowel sounds active.  Last BM 04/18  : Voiding well  Cardiovascular: WNL  Lines/Drains: PIV infusing Insulin drip, GT stoma has a catheter in.  Skin: WNL  Diet: NPO after midnight    Activity: Indpendent  Labs: AM labs  Recommendations/Plan: for possible GT replacement today.

## 2025-04-17 NOTE — PROGRESS NOTES
Brief note:     Pt is currently admitted for a displaced G tube/malnutrition, plan for G tube replacement during admission. Per chart review, hyperglycemic on admission with A1c 19.1. We placed a coordination care referral and notified our clinic RN Jose for close follow up and hopefully will be able to facilitate immediate clinic visit post admission, although we have not been able to reach out to her in the past few months despite our best efforts. Pt remains at high risk of readmission due to non compliance and lack of follow up.     Abebe Knight DO, PGY3  Internal Medicine

## 2025-04-17 NOTE — PROGRESS NOTES
4/17/2025 Gastroenterology Brief Note      Chantell Kidd is a 59 year old female who GI AE is following for replacement of displaced PEGJ tube.     Chart reviewed since last evaluated. NAEON. Vitals stable - afebrile, normotensive, not hypoxic or tachycardic.    BMP  Recent Labs   Lab 04/17/25  0600 04/15/25  2130    123*   POTASSIUM 3.5 3.6   CHLORIDE 103 85*   ELIZA 8.5* 8.9   CO2 27 23   BUN 5.3* 6.1*   CR 0.40* 0.35*     HEME  Recent Labs   Lab 04/15/25  2130   WBC 10.0   HGB 11.9        LIVER  Recent Labs   Lab 04/15/25  2130   ALKPHOS 186*   *   *   BILITOTAL 0.3      PANC  Recent Labs   Lab 04/15/25  2130   LIPASE 7*         Recommendations:  -- Plan for PEG-J placement today in [facility]   -- Currently scheduled for 10:10AM ( time subject to change)  -- NPO now - ensure tube feeds are held  -- Not on Anticoagulation      The patient was not seen today. This note is a product of chart review and discussion with primary team.       Above in collaboration/discussed with Dr. Perdomo, GI attending    Ne Simons PA-C, RD  Advanced Endoscopy/Pancreaticobiliary GI Service  [facility]  [place]

## 2025-04-17 NOTE — PROGRESS NOTES
"Shift: [id number]  VS: Blood pressure 104/68, pulse 79, temperature 97.8  F (36.6  C), temperature source Oral, resp. rate 14, height 1.575 m (5' 2\"), weight 46.4 kg (102 lb 4.7 oz), SpO2 98%, not currently breastfeeding.   Pain: 7/10. Managed with oxycodone, tylenol, gabapentin.   Neuro: A&OX4. Sleeps intermittently easily arouses   Cardiac: Pt denies chest pain.  Respiratory: No SOB, on room air.  GI/Diet/Appetite: Patient on tube feeding 10ml/hr increasing by 10Q8. 60ml water flushes Q4.   : Continent   LDA's: G-J tube and Left arm PIV.    Skin: Intact   Activity: Up with assistance. Uses cane or IV pole   Tests/Procedures: GJ tube replacement   Pertinent Labs/Lab Collection: BMP, Magnesium, and Phosphorous. Follow glucose orders.        Goal Outcome Evaluation:   Plan of Care Reviewed With: patient   Overall Patient Progress: New gj tube. Monitor blood glucose.     Follow plan of care.   "

## 2025-04-17 NOTE — CONSULTS
Care Management Initial Consult    General Information  Assessment completed with: PatientChantell  Type of CM/SW Visit: Initial Assessment    Primary Care Provider verified and updated as needed: Yes ([phone])   Readmission within the last 30 days: no previous admission in last 30 days      Reason for Consult: discharge planning, other (see comments) (Elevated Risk Score.)  Advance Care Planning: Advance Care Planning Reviewed: other (see comments) (SW provided pt with a HCD to complete.)       Communication Assessment  Patient's communication style: spoken language (English or Bilingual)    Hearing Difficulty or Deaf: no   Wear Glasses or Blind: yes    Cognitive  Cognitive/Neuro/Behavioral: WDL                      Living Environment:   People in home: spouse     Current living Arrangements: house      Able to return to prior arrangements: yes    Family/Social Support:  Care provided by: self, spouse/significant other  Provides care for: no one  Marital Status:   Support system: , Children          Description of Support System: Supportive, Involved    Support Assessment: Adequate social supports, Adequate family and caregiver support    Current Resources:   Patient receiving home care services: No    Community Resources: None  Equipment currently used at home: cane, straight, grab bar, tub/shower, dressing device, glucometer  Supplies currently used at home: Enteral Nutrition & Supplies    Employment/Financial:  Employment Status: disabled        Financial Concerns: none     Does the patient's insurance plan have a 3 day qualifying hospital stay waiver?  No    Lifestyle & Psychosocial Needs:  Social Drivers of Health     Food Insecurity: High Risk (4/16/2025)    Food Insecurity     Within the past 12 months, did you worry that your food would run out before you got money to buy more?: Yes     Within the past 12 months, did the food you bought just not last and you didn t  have money to get more?: Yes   Depression: At risk (1/23/2025)    PHQ-2     PHQ-2 Score: 6   Housing Stability: Low Risk  (4/16/2025)    Housing Stability     Do you have housing? : Yes     Are you worried about losing your housing?: No   Tobacco Use: Low Risk  (4/17/2025)    Patient History     Smoking Tobacco Use: Never     Smokeless Tobacco Use: Never     Passive Exposure: Not on file   Financial Resource Strain: Low Risk  (4/16/2025)    Financial Resource Strain     Within the past 12 months, have you or your family members you live with been unable to get utilities (heat, electricity) when it was really needed?: No   Alcohol Use: Not on file   Transportation Needs: Low Risk  (4/16/2025)    Transportation Needs     Within the past 12 months, has lack of transportation kept you from medical appointments, getting your medicines, non-medical meetings or appointments, work, or from getting things that you need?: No   Physical Activity: Not on file   Interpersonal Safety: Low Risk  (4/16/2025)    Interpersonal Safety     Do you feel physically and emotionally safe where you currently live?: Yes     Within the past 12 months, have you been hit, slapped, kicked or otherwise physically hurt by someone?: No     Within the past 12 months, have you been humiliated or emotionally abused in other ways by your partner or ex-partner?: No   Stress: Not on file   Social Connections: Not on file   Health Literacy: Not on file     Functional Status:  Prior to admission patient needed assistance:   Dependent ADLs:: Dressing, Bathing, Toileting, Grooming  Dependent IADLs:: Cleaning, Laundry, Shopping, Transportation, Medication Management     Mental Health Status:  Mental Health Status: No Current Concerns       Chemical Dependency Status:  Chemical Dependency Status: No Current Concerns           Values/Beliefs:  Spiritual, Cultural Beliefs, Restorationist Practices, Values that affect care: no  Description of Beliefs that Will Affect  Care: Taoism - declined  or other intervention at this time          Discussed  Partnership in Safe Discharge Planning  document with patient/family: No    Additional Information:  SW received consult for discharge planning due to pt needing to switch her TF from a company in [place] to [facility]. SW message [facility] liaison Savi regarding transferring TF to [facility] and Savi asked referral to be sent to them and once they check benefits and can accept pt, pt has to cancel previous TF services. STEVE met with pt at bedside and introduced self,role, and explained purpose of assessment. SW verified PCP,address, and insurance. Pt reported she lives at home with her  and he helps with I/ADLs needs. Pt reported she has a cane, grab bars, and she was getting a TF from a company in [place]. SW informed pt that home infusion referral will be sent to  home infusion, they will check benefits and they will check pt's benefits. If pt has coverage than she will have to call the company in [place] and cancel services with them. Pt is agreeable to doing and STEVE discussed health care directive and explained the process of completing a HCD. Pt shared she wants to complete a HCD and SW provided her with a HCD document and informed it will be notarized when she finishes it. Pt shared her  will be able to take her home when she is medically ready.     Next Steps:   -Follow up on HCD document and [place] referral.    JOYCE Camp, PHYLLIS  OBS/ED   Phone: [phone]  Fax: [phone]  Vocera: 1A Obs STEVE

## 2025-04-17 NOTE — PROGRESS NOTES
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 Inpatient Diabetes Service consulted for glycemic management recommendations.          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 Poor control  (A1c 19.1 % 4/16/25, Hgb: 11.9)  Approaching HHS [glucose 918, calculated serum osmolality 299, ketones 1.51, vpH 7.43]    Plan/Recommendations:   - IV insulin drip non-DKA protocol as TF advance  - Start Lantus 9 units q 24 hrs at 1800  - Novolog Meal Coverage: 1 unit per 20 grams CHO, TID AC and PRN with snacks/supplements  - BG Monitoring: Every 1-2 hours on insulin drip   - Hypoglycemia protocol  - Carb counting protocol     Discussion:   BG fairly stable most of the day yesterday on 0.5 units/hr. Dipped below 100 mg/dL at 1826 and drip rate reduced to 0 units/hr per protocol. Next BG check not until 2016 and was significantly elevated at 323 mg/dL. Drip restarted and BG elevated further at next check. Drip algorithm advanced to algorithm 2 at that time. Strongly suspect there could have been an uncovered carbohydrate component to this elevation, however only documentation of liquid intake yesterday and no carbs noted. Pt note she had a few bites of potatoes--may not have been sufficient for carb coverage. Pt then had hypoglycemia at 0344 this AM with BG of 57 mg/dL. Requested D10 be started to support insulin drip. Recommend this continue until TF are started.    Will plan to continue on insulin drip as TF  titrate overnight--pt in agreement with this. Will start Lantus dose that appears to be pt's basal needs tonight and continue on drip. Hopefully to stop insulin drip tomorrow or Saturday. Recommend NPH dosing for pt to cover TF, though it appears she was using Lantus previously (not discussed with pt as she was seen in PACU). Will leave dosing/transition for IDS team member tomorrow depending on overnight trends.     TF to start this afternoon. Sagrario Marinelli Peptide 1.5 provides:  10 mL/hr = 1.38 g/hr  20 mL/hr = 2.76 g/hr  30 mL/hr = 4.14 g/hr  40 mL/hr = 5.52 g/hr  45 mL/hr = 6.21 g/hr    Contacted discharge pharmacy regarding dexcom sensors. Pt needs to go through specialty pharmacy for dexcom fills as they are not covered under her Medicare Part D plan.     Discharge Planning: (tentative)  Medications:  TBD    Test Claims:  none needed.   Education:  Needs to be assessed closer to discharge.    Outpatient Follow-up:  recommend [facility]; next scheduled 5/13/25 with Libby Jay RN and 8/21/25 with Dr. Maher    Please notify Inpatient Diabetes Service if changes are planned to steroids, nutrition, TPN/TF and anticipated procedures requiring prolonged NPO status.         Interval History/Review of Systems :   The last 24 hours progress and nursing notes reviewed.  - Hypoglycemic overnight.  - surgery today.   - seen in PACU    Planned Procedures/Surgeries: PEGJ exchange 4/17 vs separate PEG and PEJ    Inpatient Glucose Control:       Recent Labs   Lab 04/17/25  1333 04/17/25  1114 04/17/25  0900 04/17/25  0727 04/17/25  0636 04/17/25  0600   * 134* 107* 51* 84 100*             Medications Impacting Glycemia:   Steroids:  hydrocortisone 10 mg am, 15 mg HS (adrenal insufficiency)    D5W-containing solutions/medications: none   Other medications impacting glucose: none         Nutrition:   Orders Placed This Encounter      Regular Diet Adult    Supplements: none   TF: Goal TF: [employer] Peptide  "1.5 (or equivalent) @ 45 mL/hr (1080 mL/day) to provide  149 g CHO daily   - 4/17: Sagrario Marinelli Peptide 1.5 @ 10mL/hr   TPN: none         Diabetes History: see full consult note for complete diabetes history   Diabetes Type and Duration: Pancreatogenic diabetes s/p total pancreatectomy and islet autotransplant with insulin dependence since 1/2012  GAD65 antibody, zinc transporter 8 antibody, islet antibody, insulin antibody not available on epic search.     Numerous C-peptides:  Component      Latest Ref Rng 8/28/2018  6:47 AM 9/3/2018  6:41 PM 9/6/2018  8:00 AM 9/7/2018  6:55 AM 4/18/2019  11:04 AM 4/3/2025  2:01 PM   C-Peptide      0.9 - 6.9 ng/mL 0.3 (L)  0.2 (L)  1.8  2.8  1.8  0.5 (L)    Patient Fasting?           Yes          PTA Medication Regimen:  started new pens  - Lantus 18 units AM, 12 units PM  - Novolog ICR 1:16  - Novolog correction 1:35 (more intuitive dosing)  Missing doses?: denies  Historical Diabetes Medications: MDI, insulin pumps     Glucose monitoring device/frequency/trends: Has not picked up dexcom yet--does not think it has been filled. Accucheck (checking 4-6 times daily) running 400 to \"high\" generally  Hypoglycemia PTA:   - Frequency: none  - Severity: + hx of hypoglycemic seizures  - Awareness: absent/decreased  - Treatment: candies in purse, glucose liquid shot, glucose tabs     Outpatient Diabetes Provider: [facility]: Dr. Maher (LOV 4/3/25)  Formal Diabetes Education/Educator: yes        Physical Exam:   /71 (BP Location: Right arm, Patient Position: Semi-Amaya's, Cuff Size: Adult Small)   Pulse 73   Temp 97.8  F (36.6  C) (Oral)   Resp 12   Ht 1.575 m (5' 2\")   Wt 46.4 kg (102 lb 4.7 oz)   SpO2 99%   BMI 18.71 kg/m    Constitutional: tired-appearing patient in no acute distress.  Eyes: sclera anicteric.  Respiratory: No signs of labored breathing.         Data:     Lab Results   Component Value Date    A1C 19.1 (H) 04/15/2025    A1C 18.9 (H) 04/03/2025    " A1C 17.1 (H) 01/23/2025    A1C 11.1 (H) 03/02/2023    A1C 13.3 (H) 09/26/2022    A1C 7.3 (H) 11/09/2020    A1C 6.7 (A) 11/21/2019    A1C 8.2 (H) 06/11/2019    A1C Canceled, Test credited 06/10/2019    A1C 9.6 (H) 04/18/2019       ROUTINE IP LABS (Last four results)  BMP  Recent Labs   Lab 04/17/25  1333 04/17/25  1114 04/17/25  0900 04/17/25  0727 04/17/25  0636 04/17/25  0600 04/15/25  2354 04/15/25  2130   NA  --   --   --   --   --  138  --  123*   POTASSIUM  --   --   --   --   --  3.5  --  3.6   CHLORIDE  --   --   --   --   --  103  --  85*   ELIZA  --   --   --   --   --  8.5*  --  8.9   CO2  --   --   --   --   --  27  --  23   BUN  --   --   --   --   --  5.3*  --  6.1*   CR  --   --   --   --   --  0.40*  --  0.35*   * 134* 107* 51*   < > 100*   < > 918*    < > = values in this interval not displayed.     CBC  Recent Labs   Lab 04/15/25  2130   WBC 10.0   RBC 3.81   HGB 11.9   HCT 33.4*   MCV 88   MCH 31.2   MCHC 35.6   RDW 11.9        INRNo lab results found in last 7 days.    Inpatient Diabetes Service will continue to follow, please don't hesitate to contact the team with any questions or concerns.     Earlene Samuel PA-C  Inpatient Diabetes Service  Available on Definition 6 or Secure Chat     Plan discussed with patient, bedside RN, and primary team via this note.    To contact Inpatient Diabetes Service:     7 AM - 5 PM: Page the real5D DAVID following the patient that day (see filed or incomplete progress notes/consult notes under Endocrinology)    OR if uncertain of provider assignment: page job code [phone]    5 PM - 7 AM: First call after hours is to primary service.    For urgent after-hours questions, page job code for on call fellow: [phone]     I spent a total of 75 minutes on the date of the encounter doing prep/post-work, chart review, history and exam, documentation and further activities per the note including lab review, multidisciplinary communication, counseling the patient and/or  coordinating care regarding acute hyper/hypoglycemic management, as well as discharge management and planning/communication.

## 2025-04-17 NOTE — PLAN OF CARE
Goal Outcome Evaluation:      Plan of Care Reviewed With: patient          Outcome Evaluation: Pt will discharge to home when medically ready.

## 2025-04-18 ENCOUNTER — APPOINTMENT (OUTPATIENT)
Dept: OCCUPATIONAL THERAPY | Facility: CLINIC | Age: 60
End: 2025-04-18
Attending: PEDIATRICS
Payer: MEDICARE

## 2025-04-18 ENCOUNTER — APPOINTMENT (OUTPATIENT)
Dept: GENERAL RADIOLOGY | Facility: CLINIC | Age: 60
End: 2025-04-18
Attending: NURSE PRACTITIONER
Payer: MEDICARE

## 2025-04-18 ENCOUNTER — APPOINTMENT (OUTPATIENT)
Dept: GENERAL RADIOLOGY | Facility: CLINIC | Age: 60
End: 2025-04-18
Payer: MEDICARE

## 2025-04-18 VITALS
RESPIRATION RATE: 16 BRPM | OXYGEN SATURATION: 97 % | HEART RATE: 73 BPM | DIASTOLIC BLOOD PRESSURE: 64 MMHG | TEMPERATURE: 97.4 F | SYSTOLIC BLOOD PRESSURE: 88 MMHG | HEIGHT: 62 IN | WEIGHT: 102.29 LBS | BODY MASS INDEX: 18.82 KG/M2

## 2025-04-18 LAB
ANION GAP SERPL CALCULATED.3IONS-SCNC: 8 MMOL/L (ref 7–15)
BUN SERPL-MCNC: 6.6 MG/DL (ref 8–23)
CALCIUM SERPL-MCNC: 8.6 MG/DL (ref 8.8–10.4)
CHLORIDE SERPL-SCNC: 105 MMOL/L (ref 98–107)
CREAT SERPL-MCNC: 0.42 MG/DL (ref 0.51–0.95)
EGFRCR SERPLBLD CKD-EPI 2021: >90 ML/MIN/1.73M2
ERYTHROCYTE [DISTWIDTH] IN BLOOD BY AUTOMATED COUNT: 12.6 % (ref 10–15)
GLUCOSE BLDC GLUCOMTR-MCNC: 106 MG/DL (ref 70–99)
GLUCOSE BLDC GLUCOMTR-MCNC: 111 MG/DL (ref 70–99)
GLUCOSE BLDC GLUCOMTR-MCNC: 121 MG/DL (ref 70–99)
GLUCOSE BLDC GLUCOMTR-MCNC: 129 MG/DL (ref 70–99)
GLUCOSE BLDC GLUCOMTR-MCNC: 145 MG/DL (ref 70–99)
GLUCOSE BLDC GLUCOMTR-MCNC: 149 MG/DL (ref 70–99)
GLUCOSE BLDC GLUCOMTR-MCNC: 153 MG/DL (ref 70–99)
GLUCOSE BLDC GLUCOMTR-MCNC: 154 MG/DL (ref 70–99)
GLUCOSE BLDC GLUCOMTR-MCNC: 163 MG/DL (ref 70–99)
GLUCOSE BLDC GLUCOMTR-MCNC: 164 MG/DL (ref 70–99)
GLUCOSE BLDC GLUCOMTR-MCNC: 165 MG/DL (ref 70–99)
GLUCOSE BLDC GLUCOMTR-MCNC: 170 MG/DL (ref 70–99)
GLUCOSE BLDC GLUCOMTR-MCNC: 173 MG/DL (ref 70–99)
GLUCOSE BLDC GLUCOMTR-MCNC: 174 MG/DL (ref 70–99)
GLUCOSE BLDC GLUCOMTR-MCNC: 181 MG/DL (ref 70–99)
GLUCOSE BLDC GLUCOMTR-MCNC: 192 MG/DL (ref 70–99)
GLUCOSE BLDC GLUCOMTR-MCNC: 195 MG/DL (ref 70–99)
GLUCOSE BLDC GLUCOMTR-MCNC: 198 MG/DL (ref 70–99)
GLUCOSE BLDC GLUCOMTR-MCNC: 202 MG/DL (ref 70–99)
GLUCOSE BLDC GLUCOMTR-MCNC: 203 MG/DL (ref 70–99)
GLUCOSE BLDC GLUCOMTR-MCNC: 213 MG/DL (ref 70–99)
GLUCOSE BLDC GLUCOMTR-MCNC: 221 MG/DL (ref 70–99)
GLUCOSE BLDC GLUCOMTR-MCNC: 242 MG/DL (ref 70–99)
GLUCOSE BLDC GLUCOMTR-MCNC: 251 MG/DL (ref 70–99)
GLUCOSE SERPL-MCNC: 166 MG/DL (ref 70–99)
HCO3 SERPL-SCNC: 23 MMOL/L (ref 22–29)
HCT VFR BLD AUTO: 32.8 % (ref 35–47)
HGB BLD-MCNC: 11.3 G/DL (ref 11.7–15.7)
MAGNESIUM SERPL-MCNC: 2.1 MG/DL (ref 1.7–2.3)
MCH RBC QN AUTO: 31.4 PG (ref 26.5–33)
MCHC RBC AUTO-ENTMCNC: 34.5 G/DL (ref 31.5–36.5)
MCV RBC AUTO: 91 FL (ref 78–100)
PHOSPHATE SERPL-MCNC: 3 MG/DL (ref 2.5–4.5)
PLATELET # BLD AUTO: 361 10E3/UL (ref 150–450)
POTASSIUM SERPL-SCNC: 4.2 MMOL/L (ref 3.4–5.3)
RBC # BLD AUTO: 3.6 10E6/UL (ref 3.8–5.2)
SODIUM SERPL-SCNC: 136 MMOL/L (ref 135–145)
WBC # BLD AUTO: 6.2 10E3/UL (ref 4–11)

## 2025-04-18 PROCEDURE — 250N000013 HC RX MED GY IP 250 OP 250 PS 637: Performed by: INTERNAL MEDICINE

## 2025-04-18 PROCEDURE — 85027 COMPLETE CBC AUTOMATED: CPT | Performed by: NURSE PRACTITIONER

## 2025-04-18 PROCEDURE — 74018 RADEX ABDOMEN 1 VIEW: CPT | Mod: 26 | Performed by: STUDENT IN AN ORGANIZED HEALTH CARE EDUCATION/TRAINING PROGRAM

## 2025-04-18 PROCEDURE — 99232 SBSQ HOSP IP/OBS MODERATE 35: CPT

## 2025-04-18 PROCEDURE — 97165 OT EVAL LOW COMPLEX 30 MIN: CPT | Mod: GO

## 2025-04-18 PROCEDURE — 258N000003 HC RX IP 258 OP 636

## 2025-04-18 PROCEDURE — 80048 BASIC METABOLIC PNL TOTAL CA: CPT | Performed by: NURSE PRACTITIONER

## 2025-04-18 PROCEDURE — 36415 COLL VENOUS BLD VENIPUNCTURE: CPT | Performed by: NURSE PRACTITIONER

## 2025-04-18 PROCEDURE — 250N000011 HC RX IP 250 OP 636: Mod: JZ

## 2025-04-18 PROCEDURE — 120N000002 HC R&B MED SURG/OB UMMC

## 2025-04-18 PROCEDURE — 97530 THERAPEUTIC ACTIVITIES: CPT | Mod: GO

## 2025-04-18 PROCEDURE — 999N000065 XR ABDOMEN PORT 1 VIEW

## 2025-04-18 PROCEDURE — 84100 ASSAY OF PHOSPHORUS: CPT | Performed by: INTERNAL MEDICINE

## 2025-04-18 PROCEDURE — 250N000011 HC RX IP 250 OP 636: Mod: JZ | Performed by: NURSE PRACTITIONER

## 2025-04-18 PROCEDURE — 258N000003 HC RX IP 258 OP 636: Performed by: NURSE PRACTITIONER

## 2025-04-18 PROCEDURE — 83735 ASSAY OF MAGNESIUM: CPT | Performed by: INTERNAL MEDICINE

## 2025-04-18 PROCEDURE — 97535 SELF CARE MNGMENT TRAINING: CPT | Mod: GO

## 2025-04-18 PROCEDURE — 99233 SBSQ HOSP IP/OBS HIGH 50: CPT

## 2025-04-18 PROCEDURE — 999N000128 HC STATISTIC PERIPHERAL IV START W/O US GUIDANCE

## 2025-04-18 PROCEDURE — 250N000011 HC RX IP 250 OP 636: Performed by: INTERNAL MEDICINE

## 2025-04-18 PROCEDURE — 250N000009 HC RX 250: Performed by: NURSE PRACTITIONER

## 2025-04-18 RX ORDER — HYDROMORPHONE HCL IN WATER/PF 6 MG/30 ML
0.4 PATIENT CONTROLLED ANALGESIA SYRINGE INTRAVENOUS ONCE
Status: COMPLETED | OUTPATIENT
Start: 2025-04-18 | End: 2025-04-18

## 2025-04-18 RX ORDER — KETOROLAC TROMETHAMINE 15 MG/ML
15 INJECTION, SOLUTION INTRAMUSCULAR; INTRAVENOUS ONCE
Status: COMPLETED | OUTPATIENT
Start: 2025-04-18 | End: 2025-04-18

## 2025-04-18 RX ADMIN — PANCRELIPASE 7 CAPSULE: 60000; 12000; 38000 CAPSULE, DELAYED RELEASE PELLETS ORAL at 14:22

## 2025-04-18 RX ADMIN — ACETAMINOPHEN 975 MG: 325 TABLET, FILM COATED ORAL at 15:45

## 2025-04-18 RX ADMIN — ACETAMINOPHEN 975 MG: 325 TABLET, FILM COATED ORAL at 08:19

## 2025-04-18 RX ADMIN — OXYCODONE HYDROCHLORIDE 5 MG: 5 TABLET ORAL at 08:40

## 2025-04-18 RX ADMIN — SODIUM CHLORIDE 500 ML: 9 INJECTION, SOLUTION INTRAVENOUS at 12:19

## 2025-04-18 RX ADMIN — DICYCLOMINE HYDROCHLORIDE 10 MG: 10 CAPSULE ORAL at 06:26

## 2025-04-18 RX ADMIN — PANTOPRAZOLE SODIUM 40 MG: 40 TABLET, DELAYED RELEASE ORAL at 20:37

## 2025-04-18 RX ADMIN — LINACLOTIDE 145 MCG: 145 CAPSULE, GELATIN COATED ORAL at 08:19

## 2025-04-18 RX ADMIN — TRAZODONE HYDROCHLORIDE 100 MG: 100 TABLET ORAL at 23:40

## 2025-04-18 RX ADMIN — PANCRELIPASE 7 CAPSULE: 60000; 12000; 38000 CAPSULE, DELAYED RELEASE PELLETS ORAL at 09:17

## 2025-04-18 RX ADMIN — DICYCLOMINE HYDROCHLORIDE 10 MG: 10 CAPSULE ORAL at 12:20

## 2025-04-18 RX ADMIN — THIAMINE HCL TAB 100 MG 100 MG: 100 TAB at 08:19

## 2025-04-18 RX ADMIN — LEVOTHYROXINE SODIUM 112 MCG: 0.11 TABLET ORAL at 08:19

## 2025-04-18 RX ADMIN — DICYCLOMINE HYDROCHLORIDE 10 MG: 10 CAPSULE ORAL at 23:42

## 2025-04-18 RX ADMIN — PANTOPRAZOLE SODIUM 40 MG: 40 TABLET, DELAYED RELEASE ORAL at 08:19

## 2025-04-18 RX ADMIN — OXYCODONE HYDROCHLORIDE 5 MG: 5 TABLET ORAL at 13:37

## 2025-04-18 RX ADMIN — CYCLOBENZAPRINE HYDROCHLORIDE 5 MG: 5 TABLET, FILM COATED ORAL at 20:37

## 2025-04-18 RX ADMIN — SUCRALFATE 1 G: 1 TABLET ORAL at 08:19

## 2025-04-18 RX ADMIN — ACETAMINOPHEN 975 MG: 325 TABLET, FILM COATED ORAL at 00:03

## 2025-04-18 RX ADMIN — POLYETHYLENE GLYCOL 3350 17 G: 17 POWDER, FOR SOLUTION ORAL at 08:19

## 2025-04-18 RX ADMIN — GABAPENTIN 100 MG: 100 CAPSULE ORAL at 20:38

## 2025-04-18 RX ADMIN — TOPIRAMATE 100 MG: 50 TABLET, FILM COATED ORAL at 08:18

## 2025-04-18 RX ADMIN — METOCLOPRAMIDE 10 MG: 5 TABLET ORAL at 08:19

## 2025-04-18 RX ADMIN — DULOXETINE 90 MG: 60 CAPSULE, DELAYED RELEASE ORAL at 08:19

## 2025-04-18 RX ADMIN — DICYCLOMINE HYDROCHLORIDE 10 MG: 10 CAPSULE ORAL at 00:03

## 2025-04-18 RX ADMIN — POTASSIUM CHLORIDE 20 MEQ: 750 TABLET, EXTENDED RELEASE ORAL at 08:19

## 2025-04-18 RX ADMIN — Medication 15 ML: at 08:18

## 2025-04-18 RX ADMIN — MENTHOL 1 PATCH: 205.5 PATCH TOPICAL at 15:14

## 2025-04-18 RX ADMIN — SODIUM CHLORIDE 500 ML: 0.9 INJECTION, SOLUTION INTRAVENOUS at 01:50

## 2025-04-18 RX ADMIN — FAMOTIDINE 20 MG: 20 TABLET, FILM COATED ORAL at 23:41

## 2025-04-18 RX ADMIN — OXYCODONE HYDROCHLORIDE 5 MG: 5 TABLET ORAL at 17:42

## 2025-04-18 RX ADMIN — TOPIRAMATE 100 MG: 50 TABLET, FILM COATED ORAL at 20:37

## 2025-04-18 RX ADMIN — GABAPENTIN 100 MG: 100 CAPSULE ORAL at 13:32

## 2025-04-18 RX ADMIN — ACETAMINOPHEN 975 MG: 325 TABLET, FILM COATED ORAL at 23:40

## 2025-04-18 RX ADMIN — INSULIN ASPART 5 UNITS: 100 INJECTION, SOLUTION INTRAVENOUS; SUBCUTANEOUS at 09:17

## 2025-04-18 RX ADMIN — SUCRALFATE 1 G: 1 TABLET ORAL at 15:45

## 2025-04-18 RX ADMIN — INSULIN ASPART 9 UNITS: 100 INJECTION, SOLUTION INTRAVENOUS; SUBCUTANEOUS at 14:22

## 2025-04-18 RX ADMIN — CYCLOBENZAPRINE HYDROCHLORIDE 5 MG: 5 TABLET, FILM COATED ORAL at 10:31

## 2025-04-18 RX ADMIN — HYDROCORTISONE 15 MG: 10 TABLET ORAL at 23:48

## 2025-04-18 RX ADMIN — HYDROCORTISONE 10 MG: 10 TABLET ORAL at 08:18

## 2025-04-18 RX ADMIN — INSULIN ASPART 10 UNITS: 100 INJECTION, SOLUTION INTRAVENOUS; SUBCUTANEOUS at 19:23

## 2025-04-18 RX ADMIN — DICYCLOMINE HYDROCHLORIDE 10 MG: 10 CAPSULE ORAL at 17:42

## 2025-04-18 RX ADMIN — KETOROLAC TROMETHAMINE 15 MG: 15 INJECTION, SOLUTION INTRAMUSCULAR; INTRAVENOUS at 16:42

## 2025-04-18 RX ADMIN — ONDANSETRON 4 MG: 4 TABLET, ORALLY DISINTEGRATING ORAL at 08:40

## 2025-04-18 RX ADMIN — SUCRALFATE 1 G: 1 TABLET ORAL at 11:30

## 2025-04-18 RX ADMIN — HYDROMORPHONE HYDROCHLORIDE 0.4 MG: 0.2 INJECTION, SOLUTION INTRAMUSCULAR; INTRAVENOUS; SUBCUTANEOUS at 23:27

## 2025-04-18 RX ADMIN — GABAPENTIN 100 MG: 100 CAPSULE ORAL at 08:19

## 2025-04-18 RX ADMIN — METOCLOPRAMIDE 10 MG: 5 TABLET ORAL at 20:37

## 2025-04-18 RX ADMIN — SUCRALFATE 1 G: 1 TABLET ORAL at 23:40

## 2025-04-18 RX ADMIN — METOCLOPRAMIDE 10 MG: 5 TABLET ORAL at 13:32

## 2025-04-18 ASSESSMENT — ACTIVITIES OF DAILY LIVING (ADL)
ADLS_ACUITY_SCORE: 60
ADLS_ACUITY_SCORE: 58
ADLS_ACUITY_SCORE: 58
ADLS_ACUITY_SCORE: 60
ADLS_ACUITY_SCORE: 60
ADLS_ACUITY_SCORE: 58
ADLS_ACUITY_SCORE: 60
ADLS_ACUITY_SCORE: 58
ADLS_ACUITY_SCORE: 60
ADLS_ACUITY_SCORE: 60
ADLS_ACUITY_SCORE: 58
ADLS_ACUITY_SCORE: 60
ADLS_ACUITY_SCORE: 58

## 2025-04-18 NOTE — PROGRESS NOTES
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 Advanced endoscopy consulted for replacement of PEGJ 4/17.      #S/p PEGJ replacement 4/17  #PEGJ tube displacement   #Abdominal pain   #History of TPAIT (2012)  Presents with displacement of her PEGJ tube 4/15. She states this was placed in [place] in 2024 and she was tolerating feeds up until January. 1/28/2025, CT noted that the percutaneous gastrojejunostomy tube was coiled in the proximal stomach with the tip in the distal stomach. Since this time she has had increased abdominal bloating and pain with feeds. She reports she does vent her G tube a few times/week when she feels nauseous. Unsure if pt was aware that the J port was coiled in her stomach.      Patient had a PEGJ placed post TPAIT in 2012 that she had for a few years. IR has not offered exchange of this GJ tube since 2017 given inability to sedate without anesthesia and anatomy that makes exchange technically challenging with tube that continues to flip back in to the stomach.     Replacement of PEGJ 4/17 with Dr. Perdomo. Uncomplicated removal of holding Mckee and placement of a gastrojejunotomy tube with tip confirmed in the jejunum across a mature gastrostomy tract. Top of external at 4.5 cm, skin at 3 cm. Intentionally loose. Bumper was at 5 cm, GI team adjusted to 4.5 cm  -- Monitor abdominal exam closely post procedure  -- All medications should be administered via Jejunal port, ideally in suspension form   -- If suspension form is not available, crushed medications can be administered while ensuring adequate flushes before and afterwards  -- Flush J port and  G port EACH with 30 ml water every 4 hrs at a minimum for patency (or per RD order for free water flushes).    -- Flush J port with 30 ml water BEFORE AND AFTER medications to avoid clogging of this tube.    -- No anticoagulation for 72 hours post operatively (including VTE prophy)  -- PEG site care and TF educaton per bedside RN.  -- Utilize tube securement device to ensure tube is not dislodged  -- Some leakage from the site is expected. DO NOT OVER TIGHTEN the external bumper as this leads to Buried Bumper syndrome  -- Analgesia/antiemetics per primary team (expect pain to peak POD2-3)  -- Put G tube to gravity in a bag to help with distention  -- If continued abdominal pain/nausea, recommend Abd XR this afternoon to ensure PEGJ in correct place.     #Severe malnutrition in the context of chronic illness   Patient reports > 30 lb weight loss in the last 6 months. Her peak weight over the last several years was 153 pounds in 2020.  Pt began to have decline in PO intakes 1 year ago. With her inability to maintain weight and intolerance to PO intakes (n/v/bloating), she had a PEGJ placed in [place] in 2024 and tube feeds were restarted. No enzymes were ordered with the tube feeds. She has continued to lose weight since November. She does eat some liquids at home and reports oily diarrhea.   -- Dietitian consulted  -- Start tube feeds slowly (10ml/hr, increase by 10ml q6-8hr OR per dietitian guidance)  -- Monitor oral intake, regular diet ordered  -- Monitor for refeeding syndrome (K+, Phos and Mg++) and replace lytes as needed (high replacement protocol)  -- 100mg Thiamine daily, MVI with minerals      The patient was discussed and plan agreed upon with GI staff, Dr. Perdomo    Overall time spent on the date of this encounter preparing to see the patient (including chart review of available notes, clinical status events, imaging and labs); obtaining interim history/subjective data; ordering and/or coordinating  "medications, tests or procedures; ordering medications, tests or procedures; communicating with other health care professionals; and documenting the above clinical information in the electronic medical record was 45 minutes.     Ne Simons PA-C, RD  Inpatient Gastroenterology Service  [facility]      _______________________________________________________________      Subjective: Nursing notes and 24hr events reviewed. NAEO.    Patient seen and examined at 11:30. Patient reports no issues with her new PEGJ. Tolerating tube feeds at this time (currently @ 20 ml/hr, goal: 45). She does endorse some abdominal distention and tenderness to palpation of entire abdomen. She reports ongoing abdominal pain that radiates to her back that is not new and was occurring prior to the PEGJ exchange.     ROS:   4 pt ROS negative unless noted in subjective.     Objective:  Blood pressure 96/62, pulse 70, temperature 97.6  F (36.4  C), temperature source Oral, resp. rate 16, height 1.575 m (5' 2\"), weight 47.5 kg (104 lb 11.5 oz), SpO2 99%, not currently breastfeeding.  Gen: cachetic appearing women lying in bed. Appears comfortable  HEENT: NCAT. Conjunctiva clear. Sclera anicteric   Lungs: No increased work of breathing, speaking in full sentences, equal chest rise.  On Room Air.   Heart: Regular rate. Peripheral perfusion intact.  Abd: Soft, mildy tender to palpation of LUQ and RUQ, mildly distended, no guarding or rebound, +BS, PEGJ in LUQ with some leakage, external bumper @ 4.5 cm, left slightly loose. No redness, swelling around site. Split guaze dressing changed. Gastric port to gravity with bag, jejunal port with tube feeds infusing   Msk: no gross deformity  Skin:  No jaundice  Ext: warm, well perfused  Neuro: grossly normal  Mental status/Psych: A&O. Asks/answers questions appropriately       PROCEDURES:  UPPER GI ENDOSCOPY (04/17/2025 10:10 AM)     LABS:  BMP  Recent Labs   Lab " 04/18/25  0814 04/18/25  0658 04/18/25  0603 04/18/25  0507 04/18/25  0254 04/18/25  0216 04/17/25  0636 04/17/25  0600 04/15/25  2354 04/15/25  2130   NA  --   --   --   --   --  136  --  138  --  123*   POTASSIUM  --   --   --   --   --  4.2  --  3.5  --  3.6   CHLORIDE  --   --   --   --   --  105  --  103  --  85*   ELIZA  --   --   --   --   --  8.6*  --  8.5*  --  8.9   CO2  --   --   --   --   --  23  --  27  --  23   BUN  --   --   --   --   --  6.6*  --  5.3*  --  6.1*   CR  --   --   --   --   --  0.42*  --  0.40*  --  0.35*   * 121* 111* 129*   < > 166*   < > 100*   < > 918*    < > = values in this interval not displayed.     CBC  Recent Labs   Lab 04/18/25  0216 04/15/25  2130   WBC 6.2 10.0   RBC 3.60* 3.81   HGB 11.3* 11.9   HCT 32.8* 33.4*   MCV 91 88   MCH 31.4 31.2   MCHC 34.5 35.6   RDW 12.6 11.9    330     INRNo lab results found in last 7 days.  LFTs  Recent Labs   Lab 04/15/25  2130   ALKPHOS 186*   *   *   BILITOTAL 0.3   PROTTOTAL 6.2*   ALBUMIN 3.9      PANC  Recent Labs   Lab 04/15/25  2130   LIPASE 7*         IMAGING:  (Personally reviewed)  CT Abdomen Pelvis w Contrast (04/16/2025 12:57 AM)   CT Chest/Abdomen/Pelvis w Contrast (01/28/2025 2:10 PM)

## 2025-04-18 NOTE — PROGRESS NOTES
"   04/18/25 8814   Appointment Info   Signing Clinician's Name / Credentials (OT) Gavi Montoya, OTR/L   Rehab Comments (OT) Protective Abdominal   Living Environment   People in Home spouse   Current Living Arrangements house   Home Accessibility stairs to enter home;stairs within home   Number of Stairs, Main Entrance 2   Stair Railings, Main Entrance railings safe and in good condition   Number of Stairs, Within Home, Primary greater than 10 stairs  (18)   Stair Railings, Within Home, Primary railings safe and in good condition   Transportation Anticipated family or friend will provide   Living Environment Comments Pt reports recently relocating from [place] ([place]) and lives in 2-story home with her spouse who is home 24/7 and can provide physical assist. 2 MANUEL from garage with 1 rail, full flight up to bedroom/bathroom on 2nd level of home with 1 rail. BATHROOM: tub/shower w/ grab bars.   Self-Care   Usual Activity Tolerance fair   Current Activity Tolerance fair   Regular Exercise No   Equipment Currently Used at Home cane, straight;grab bar, tub/shower;glucometer   Fall history within last six months yes   Number of times patient has fallen within last six months 3   Activity/Exercise/Self-Care Comment Pt reports being primarily IND w/ ADLs, occasionally needs assist with LBD d/t difficulty bending over. Pt IND with functional mobility w/o AD, occasionally uses SPC or will  hold onto spouse. Reports limited activity tolerance ~1 block prior to fatigue. Shares iADL tasks with , pt will occasionally cook. Pt does not drive d/t \"not trusting herself.\" Reports spouse assists with med mgmt - will set out pills and remind her to take them, spouse A with almost all aspects of g-tube management. Pt primarily manages her own insulin though spouse will provide reminders and double check dosages. Reports falls d/t: fall over while getting dressing from seated position, off balance/weak, and from lightheadedness " at night.   General Information   Onset of Illness/Injury or Date of Surgery 04/15/25   Referring Physician Tom Rosario MD   Patient/Family Therapy Goal Statement (OT) Increase strength   Additional Occupational Profile Info/Pertinent History of Current Problem Patient is a 59-year-old female admitted on 4/15/2025 with a significant past medical history of insulin-dependent diabetes mellitus after pancreatectomy, chronic pancreatitis, migraine, hypothyroidism, and G tube nutrition who presented to the ED with a displaced  tube and was admitted for IR replacement of the G tube.   Existing Precautions/Restrictions fall   Cognitive Status Examination   Cognitive Status Comments Pt AOx4, follows 100% of commands. Fair insight into needs though demo's some difficulty with problem solving and self-reports some memory difficulty,  assists with reminders. Would benefit from further assessment.   Visual Perception   Visual Impairment/Limitations corrective lenses for reading   Sensory   Sensory Comments SILT; reports int numbness/tingling in hands/feet which has increased over past 8 months   Range of Motion Comprehensive   Comment, General Range of Motion grossly WFL   Strength Comprehensive (MMT)   Comment, General Manual Muscle Testing (MMT) Assessment grossly WFL for basic self-cares/mobility; generalized weakness + deconditioning   Coordination   Coordination Comments reports some difficulty w. FMC shailesh with managing g-tube at home, no overt FMC impairments noted, fatigues quickly with tasks   Bed Mobility   Bed Mobility supine-sit;sit-supine   Comment (Bed Mobility) SBA   Transfers   Transfers sit-stand transfer;toilet transfer;shower transfer   Transfer Comments CGA   Balance   Balance Comments SBA with BUE support   Activities of Daily Living   BADL Assessment/Intervention bathing;lower body dressing;grooming;toileting   Bathing Assessment/Intervention   Comment, (Bathing) MIN A per clinical judgement    Lower Body Dressing Assessment/Training   Comment, (Lower Body Dressing) SET UP A   Grooming Assessment/Training   Comment, (Grooming) SET UP A   Toileting   Comment, (Toileting) SET UP A   Clinical Impression   Criteria for Skilled Therapeutic Interventions Met (OT) Yes, treatment indicated   OT Diagnosis impaired ADLs, impaired functional mobility   OT Problem List-Impairments impacting ADL problems related to;activity tolerance impaired;balance;mobility;sensation;strength;pain   Assessment of Occupational Performance 3-5 Performance Deficits   Identified Performance Deficits bathing, dressing, functional mobility, home mgmt, g-tube/med mgmt   Planned Therapy Interventions (OT) ADL retraining;IADL retraining;bed mobility training;cognition;strengthening;transfer training;progressive activity/exercise;home program guidelines   Clinical Decision Making Complexity (OT) problem focused assessment/low complexity   Risk & Benefits of therapy have been explained evaluation/treatment results reviewed;care plan/treatment goals reviewed;risks/benefits reviewed;current/potential barriers reviewed;participants voiced agreement with care plan;participants included;patient   Clinical Impression Comments Pt remains below her baseline functional status with overall decline shailesh in past 1 month requiring inc'd assist for all aspects of daily routine. She will benefit from ongoing skilled OT while IP to progress IND/safety/engagement within daily routine.   OT Total Evaluation Time   OT Eval, Low Complexity Minutes (80408) 6   OT Goals   Therapy Frequency (OT) Daily   OT Predicted Duration/Target Date for Goal Attainment 05/18/25   OT Goals Hygiene/Grooming;Lower Body Dressing;Lower Body Bathing;Toilet Transfer/Toileting;Cognition;Transfers   OT: Hygiene/Grooming modified independent   OT: Lower Body Dressing Modified independent   OT: Lower Body Bathing Modified independent   OT: Transfer Modified independent  (tub transfer)    OT: Toilet Transfer/Toileting Modified independent;toilet transfer;cleaning and garment management   OT: Cognitive Patient/caregiver will verbalize understanding of cognitive assessment results/recommendations as needed for safe discharge planning   OT Discharge Planning   OT Plan SLUMS; tub transfer; progress mobility w/ FWW (ordered > room 4/18)   OT Discharge Recommendation (DC Rec) home with assist;home with home care occupational therapy  (HHOT/PT)   OT Rationale for DC Rec Pt remains limited by: acute post-op pain 2/2 new g-tube, generalized weakness/deconditioning, impaired balance, and decreased activity tolerance. She is currently completing bADLs and functional mobility w/ largely SBA/IV pole. Pt demo'ing significant decline in functional status since recent move from [place] and notable difficulty managing cares incl g-tube/insulin mgmt. If able to confirm 24/7 assist available from spouse, ant pt will progress to d/c home with assist for heavy ADLs, ongoing assist with iADLs, and HHOT/PT. Rec inc'd assist for insulin management given reports of difficulty remembering and having spouse double check d/t uncertainty, may benefit from further training. Of note, pt will require stair clearance/training w/ PT prior to d/c home.   OT Brief overview of current status SBA/IV pole   OT Total Distance Amb During Session (feet) 150   OT Equipment Needed at Discharge shower chair  (would likely benefit from shower chair)   Total Session Time   Total Session Time (sum of timed and untimed services) 6

## 2025-04-18 NOTE — CONSULTS
Discharge Pharmacy Test Claim    Continuous Glucose Monitor  Patient may be eligible to receive a continuous glucose monitor covered by Medicare Part B provided that patient meets CGM Medicare criteria.     CGM Criteria  The patient has a diagnosis of diabetes mellitus.  The patient has been using a blood glucose monitor to test 4+ times a day.  The patient is insulin-treated with three or more daily injections of insulin (basal and rapid acting).  The patient's insulin treatment regimen requires frequent adjustments due sliding scale, carb counting, and/or labile blood sugars.    Insulin  Patient's [employer] Medicare Part D plan covers lantus, novolin N, and brand novolog pens with monthly copays of $12.15. Please notify patient to call her [employer] Medicare Part D plan as her plan is showing a commercial [employer] plan primary. This [employer] plan termed in 2022. Patient must contact [employer] to confirm that [employer] should be primary.    Test Claim Copay Notes   CGM 0.00 must meet Medicare Part B's CGM criteria   lantus solostar pens too soon to fill until 4/26   novolin N flexpens 12.15    novolog flepxens 12.15 brand required by fabi Ayala  [facility] Pharmacy Liaison, MKIMI  Ph: [phone]  Fax: [phone]  Available on Teams and Vocera  Disclaimer: Pharmacy test claims are estimates and may not reflect final costs. Suggested alternatives aim to be cost-effective and may not be therapeutically equivalent. This consult is informational and does not constitute medical advice. Clinical decisions should be made by qualified healthcare providers.

## 2025-04-18 NOTE — PROGRESS NOTES
[facility]    Medicine Progress Note - Hospitalist Service    Date of Admission:  4/15/2025    Assessment & Plan   Patient is a 59-year-old female admitted on 4/15/2025 with a significant past medical history of insulin-dependent diabetes mellitus after pancreatectomy, chronic pancreatitis, migraine, hypothyroidism, and G tube nutrition who presented to the ED with a displaced  tube and was admitted for IR replacement of the G tube.     Updates Today:  - hypotensive early this AM, asymptomatic. Received 500 ml bolus with improvement   - follow updated endocrine recs   - follow updated GI recs -> vent G-tube to help with distention, if continued pain/nausea obtain AXR   - updated patient's daughter Krysta over the phone     PEG tube displacement   Bloating  Patient reports that her G tube became dislodged approximately one day prior to admission with an audible pop. She notes distention of her abdomen with bloating and pain. IR was consulted for GJ replacement however their team is unable to replace GJ tube d/t need for general anesthesia and challenging exchange. PEG-J exchange with GI on 4/17.   - general surgery consulted, appreciate assistance   - GI luminal consulted, appreciate assistance   - vent g-tube to help with distention   - if continued abdominal pain/nausea, obtain AXR to ensure PEG-J in correct place   - G-tube for meds/venting, J-tube for feeds/flushes      Post-pancreatectomy diabetes (type 1)  TPAIT (2012)  She was previously seen outpatient by both endocrinology and PCP. Her A1c on 1/23 was extremely high at 17.1, repeat A1c 19.1% on 4/15/25. Her glucose values have ranged in the 400s to 900s outpatient. She was seen by endocrinology, who report that it is unlikely she is taking her dose of insulin at this A1c and recommended exchange for new insulin. On admission, glucose was in the 500s, though patient had no signs of DKA. Per assessment of her  endocrinologist, this is likely part of the cause of her malnutrition. Started on insulin gtt.   - endocrinology consulted, appreciate assistance   - continue insulin drip   - start lantus 9 units   - novolog CHO coverage: 1 unit per 20g CHO   - hold PTA insulin pump and CGMS while on insulin gtt  - continue PTA Creon      Acute on chronic pain  Patient reports severe pain in her abdomen and back. She reports that she has previously taken methadone for this, but no record of methadone later than 2010 could be located in her chart. CT A/P on admission unremarkable. Large amount of discrepancy regarding what her pain regimen was prior to admission, I.e. shared that she was taking methadone 20 mg 4x/day PRN for pain as well as oxycodone? National  for the past year does show prescriptions for methadone or oxycodone at all or consistently.   - pain team consulted, appreciate assistance (signed off 4/16)               - acetaminophen 975 mg PO q8h              - flexeril 5 mg PO TID PRN              - oxycodone 5 mg PO q4h PRN while inpatient while workup ongoing                           - please provide very short tapering script upon discharge               - consider gabapentin 100 mg TID               - lidocaine patches 1-3 patches               - menthol patches, 1 patch q8h               - bowel regimen   - pharmacy consulted for med rec, appreciate assistance     Hypotension - improving   Hypotensive with SBPs 70s-80s, asymptomatic. Received 500 ml bolus with improvement. Likely multifactorial including sedation from recent procedure, pain/sleep medication, and low rate of nutrition.   - continue to monitor      Malnutrition, severe  Cachexia   Patient reports > 30 lb weight loss in the last 6 months. Per endocrinology notes, this is likely multifactorial due to GI issues and uncontrolled diabetes, chronic abdominal pain. PEG-J exchanged on 4/17, tube feedings started.   - nutrition consulted, appreciate  assistance   - continue PTA tube feeds      Hx of adrenal insufficiency   Followed by Dr. Maher. Previously suppressed AM cortisol and has been on empiric therapy for possible adrenal insufficiency, unclear if central vs transient. Most recent clinic note describes inability to wean steroids due to hypoglycemia episodes.   - continue PTA hydrocortisone      Concern for malignancy  Elevated CEA   Patient was seen and evaluated by PCP with a stat CTCAP to assess for malignancy on 1/23. This scan was negative. She was scheduled for follow up with her PCP but has yet to follow up with her PCP. She notes elevated CEA since she was in [place] but has not had a workup showing any cancer at this point however tells me that she is working with her PCP on next steps.   - continue follow-up with PCP      Discharge planning:  - place referral to [facility]   - follow up with PCP regarding elevated CEA   - [facility] currently scheduled for 5/13/25 and 8/21/25          Diet: Adult Formula Drip Feeding: Continuous [place] Peptide 1.5; Jejunostomy; Goal Rate: 45; mL/hr; Initiate @ 10 ml/hr and advance by 10 ml Q8H pending pt's tolerance.; Do not advance tube feeding rate unless K+ is = or > 3.0, Mg++ is = or > 1.5, a...  Regular Diet Adult    DVT Prophylaxis: Pneumatic Compression Devices  [place] Catheter: Not present  Lines: None     Cardiac Monitoring: None  Code Status: Full Code      Clinically Significant Risk Factors                               # Severe Malnutrition: based on nutrition assessment and treatment provided per dietitian's recommendations., PRESENT ON ADMISSION   # Financial/Environmental Concerns: none         Social Drivers of Health    Food Insecurity: High Risk (4/16/2025)    Food Insecurity     Within the past 12 months, did you worry that your food would run out before you got money to buy more?: Yes     Within the past 12 months, did the food you bought just not last and you  didn t have money to get more?: Yes   Depression: At risk (1/23/2025)    PHQ-2     PHQ-2 Score: 6          Disposition Plan     Medically Ready for Discharge: Anticipated in 2-4 Days           The patient's care was discussed with the Attending Physician, Dr. Rosario, Bedside Nurse, Patient, Patient's Family, and GI and Endocrine Consultant(s).    Jahaira Ivey NP  Hospitalist Service  [facility]  Securely message with [employer] info)  Text page via [facility] Paging/Directory   ______________________________________________________________________    Interval History   Chantell was seen resting in bed. No acute events overnight, hypotension this AM. Says that her abdomen feels sore today, unchanged from yesterday. Discuss plan for titrating tube feeds and working with endocrine. All questions and concerns addressed at this time.     Physical Exam   Vital Signs: Temp: 97.6  F (36.4  C) Temp src: Oral BP: 96/62 Pulse: 70   Resp: 16 SpO2: 99 % O2 Device: None (Room air) Oxygen Delivery: 6 LPM  Weight: 104 lbs 11.5 oz    GENERAL: Alert and awake. Answering questions appropriately. Oriented x 3. NAD. Pleasant and conversational   HEENT: Anicteric sclera. EOMI. Mucous membranes moist   CARDIOVASCULAR: RRR. S1, S2. No murmurs, rubs, or gallops.   RESPIRATORY: Effort normal on RA. Clear to auscultation bilaterally, no rales, rhonchi or wheezes  GI: Abdomen soft, tender to palpation. PEG-J site intact   MUSCULOSKELETAL: Moves all extremities.   EXTREMITIES: No peripheral edema. No calf asymmetry, erythema, or tenderness.   NEUROLOGICAL: No focal deficits. Moving all extremities symmetrically.   SKIN: Intact. Warm and dry. No jaundice. No rashes on exposed skin     Medical Decision Making       60 MINUTES SPENT BY ME on the date of service doing chart review, history, exam, documentation & further activities per the note.      Data   Imaging results reviewed over the past 24 hrs:    Recent Results (from the past 24 hours)   XR Surgery NITHIN L/T 5 Min Fluoro w Stills    Narrative    This exam was marked as non-reportable because it will not be read by a   radiologist or a [place] non-radiologist provider.           Recent Labs   Lab 04/18/25  0658 04/18/25  0603 04/18/25  0507 04/18/25  0254 04/18/25  0216 04/17/25  0636 04/17/25  0600 04/15/25  2354 04/15/25  2130   WBC  --   --   --   --  6.2  --   --   --  10.0   HGB  --   --   --   --  11.3*  --   --   --  11.9   MCV  --   --   --   --  91  --   --   --  88   PLT  --   --   --   --  361  --   --   --  330   NA  --   --   --   --  136  --  138  --  123*   POTASSIUM  --   --   --   --  4.2  --  3.5  --  3.6   CHLORIDE  --   --   --   --  105  --  103  --  85*   CO2  --   --   --   --  23  --  27  --  23   BUN  --   --   --   --  6.6*  --  5.3*  --  6.1*   CR  --   --   --   --  0.42*  --  0.40*  --  0.35*   ANIONGAP  --   --   --   --  8  --  8  --  15   ELIZA  --   --   --   --  8.6*  --  8.5*  --  8.9   * 111* 129*   < > 166*   < > 100*   < > 918*   ALBUMIN  --   --   --   --   --   --   --   --  3.9   PROTTOTAL  --   --   --   --   --   --   --   --  6.2*   BILITOTAL  --   --   --   --   --   --   --   --  0.3   ALKPHOS  --   --   --   --   --   --   --   --  186*   ALT  --   --   --   --   --   --   --   --  140*   AST  --   --   --   --   --   --   --   --  213*   LIPASE  --   --   --   --   --   --   --   --  7*    < > = values in this interval not displayed.

## 2025-04-18 NOTE — PROGRESS NOTES
"Shift: 6322-4152    /67 (BP Location: Right arm)   Pulse 93   Temp 97.9  F (36.6  C) (Oral)   Resp 16   Ht 1.575 m (5' 2\")   Wt 47.5 kg (104 lb 11.5 oz)   SpO2 98%   BMI 19.15 kg/m      NEURO: WDL, A&O x4, able to make needs known, calls appropriately  RESPIRATORY: WDL, RA, denies SOB   CARDIAC: X--hypotensive, denies chest pain  GI/: X--PEG/J-tube site, some drainage, voiding spontaneously  DIET: Regular  PAIN/NAUSEA: Intermittent, pain rated at 10/10 despite medication administration. Multiple attempts made for alternative therapies with no result   INCISION/DRAINS: PEG/J-tube site  IV ACCESS: R PIV SL, L PIV infusing insulin at 3 units/hr  ACTIVITY: Independent  LAB: Mag, Phos, CBC, BMP  PLAN: Tube feed to increase to 45mL/hr at 1 AM, continue insulin drip, updated carb coverage, severe pain radiating from back to low abdomen--described as \"stabbing\"; icy hot patch in place on back, do not advance insulin drip past Algorithm 1 without first consulting provider    Juanita Carey RN on 4/18/2025 at 6:52 PM      "

## 2025-04-18 NOTE — PROVIDER NOTIFICATION
Hospitalist Melissa Christine MD     Messaged via [employer] regarding pt blood glucoses not with goal range 100-150 per insuline gttt and has patient care order to not advance past algorithm 1. MD okay with current plan of care.

## 2025-04-18 NOTE — PLAN OF CARE
Goal Outcome Evaluation:    PRIMARY DIAGNOSIS: HYPERGLYCEMIA    OUTPATIENT/OBSERVATION GOALS TO BE MET BEFORE DISCHARGE  BG greater than 100 and less than 250 on two consecutive readings: Yes  Recent Labs   Lab Test 04/18/25  1435 04/18/25  1334 04/18/25  1233   * 165* 154*       Ketones absent from urine  (hyperglycemia): Yes    Tolerating oral intake to maintain hydration:  Progressing--poor PO intake    Return to near baseline physical activity: No    Discharge Planner Nurse   Safe discharge environment identified: No  Barriers to discharge: Yes       Entered by: Juanita Carey RN 04/18/2025 2:52 PM     Please review provider order for any additional goals.   Nurse to notify provider when observation goals have been met and patient is ready for discharge.

## 2025-04-18 NOTE — PLAN OF CARE
"Goal Outcome Evaluation: No Changes    .BP 96/62 (BP Location: Right arm)   Pulse 70   Temp 97.6  F (36.4  C) (Oral)   Resp 16   Ht 1.575 m (5' 2\")   Wt 47.5 kg (104 lb 11.5 oz)   SpO2 99%   BMI 19.15 kg/m        7813-6262: Pt alert and oriented. Afebrile. Low b/p, Gave 500 NS bolus per order. Bp remains soft and she is asymptomatic. Hospitalist aware and saw pt at bedside. Ovss on room air. Denies nausea. C/o abdominal pain radiating to back, gave prn Oxycodone x1. TF increased at midnight to 20ml/hr with free water flush, tolerating well. Next TF due to increase at 0800am. On Insulin gtt staying on algorithm 1 per order. Blood glucose ranges 129-281. Glucose spikes with eating dinner last night. She received 7units insulin per carb coverage as well. Cont with poc.      Problem: Adult Inpatient Plan of Care  Goal: Patient-Specific Goal (Individualized)  Description: You can add care plan individualizations to a care plan. Examples of Individualization might be:  \"Parent requests to be called daily at 9am for status\", \"I have a hard time hearing out of my right ear\", or \"Do not touch me to wake me up as it startlesme\".  Outcome: Progressing     Problem: Comorbidity Management  Goal: Blood Glucose Levels Within Targeted Range  Outcome: Progressing     "

## 2025-04-18 NOTE — PROGRESS NOTES
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 Inpatient Diabetes Service consulted for glycemic management recommendations.          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 Poor control  (A1c 19.1 % 4/16/25, Hgb: 11.9)  Approaching HHS [glucose 918, calculated serum osmolality 299, ketones 1.51, vpH 7.43]    Plan/Recommendations:   - IV insulin drip non-DKA protocol as TF advance  - Continue Lantus 9 units q 24 hrs at 1800  - Increase Novolog Meal Coverage: 1 unit per 20 --> 1 per 18--> 1 per 15 grams CHO, TID AC and PRN with snacks/supplements  - BG Monitoring: Every 1-2 hours on insulin drip   - Stop D 10 at 25 ml/hr supporting IV insulin drip   - Hypoglycemia protocol  - Carb counting protocol     Discussion:   BG within target on IV insulin. Tube feeds at 20 ml overnight and likely advancing to goal today, however patient is reporting some distention so unclear if will meet goal today. Still on dextrose fluids at 25 ml/hr, supporting IV insulin from 0.5-1 unit per hour. Will stop Dextrose fluids and monitor trends on IV insulin as tube feeds advance to goal rate. Will likely transition to Lantus + BID NPH tomorrow. Noted glycemic elevations on IV insulin around meal times- ICR adjusted today.     TF to start this afternoon. [employer] Peptide 1.5 provides:  10 mL/hr = 1.38 g/hr  20 mL/hr = 2.76 g/hr  30 mL/hr = 4.14 g/hr  40 mL/hr = 5.52 g/hr  45 mL/hr = 6.21 g/hr    Contacted discharge  pharmacy regarding dexcom sensors. Pt needs to go through specialty pharmacy for dexcom fills as they are not covered under her Medicare Part D plan.     Discharge Planning: (tentative)  Medications:  TBD    Test Claims:  ordered 4/18 for NPH, Lantus, CGM   Education:  Needs to be assessed closer to discharge.    Outpatient Follow-up:  recommend [facility]; next scheduled 5/13/25 with Libby Jay RN and 8/21/25 with Dr. Maher    Please notify Inpatient Diabetes Service if changes are planned to steroids, nutrition, TPN/TF and anticipated procedures requiring prolonged NPO status.         Interval History/Review of Systems :   The last 24 hours progress and nursing notes reviewed.  Awake and alert. States she has been back from [place] and working with Dr. Maher on getting back on a more stable insulin regimen as well as back on an insulin pump. Confirms she has been on continuous tube feeds at home and has switched out her Lantus pens, though BG was still quite elevated on admission. Discussed typical management of tube feeds using NPH insulin, but that this may add complexity to her regimen. Patient states she is ok with this, but is unclear about her current insulin dosing. Says  is willing to come get education for insulin if needed.     Planned Procedures/Surgeries: PEGJ exchange 4/17 vs separate PEG and PEJ    Inpatient Glucose Control:       Recent Labs   Lab 04/18/25  0507 04/18/25  0358 04/18/25  0254 04/18/25  0216 04/18/25  0200 04/18/25  0054   * 149* 153* 166* 163* 181*             Medications Impacting Glycemia:   Steroids:  hydrocortisone 10 mg am, 15 mg HS (adrenal insufficiency)    D5W-containing solutions/medications: none   Other medications impacting glucose: none         Nutrition:   Orders Placed This Encounter      Regular Diet Adult    Supplements: none   TF: Goal TF: [employer] Peptide 1.5 (or equivalent) @ 45 mL/hr (1080 mL/day) to provide  149 g CHO daily   -  "4/17: [employer] Peptide 1.5 @ 10mL/hr   TPN: none         Diabetes History: see full consult note for complete diabetes history   Diabetes Type and Duration: Pancreatogenic diabetes s/p total pancreatectomy and islet autotransplant with insulin dependence since 1/2012  GAD65 antibody, zinc transporter 8 antibody, islet antibody, insulin antibody not available on epic search.     Numerous C-peptides:  Component      Latest Ref Rng 8/28/2018  6:47 AM 9/3/2018  6:41 PM 9/6/2018  8:00 AM 9/7/2018  6:55 AM 4/18/2019  11:04 AM 4/3/2025  2:01 PM   C-Peptide      0.9 - 6.9 ng/mL 0.3 (L)  0.2 (L)  1.8  2.8  1.8  0.5 (L)    Patient Fasting?           Yes          PTA Medication Regimen:  started new pens  - Lantus 18 units AM, 12 units PM  - Novolog ICR 1:16  - Novolog correction 1:35 (more intuitive dosing)  Missing doses?: denies  Historical Diabetes Medications: MDI, insulin pumps     Glucose monitoring device/frequency/trends: Has not picked up dexcom yet--does not think it has been filled. Accucheck (checking 4-6 times daily) running 400 to \"high\" generally  Hypoglycemia PTA:   - Frequency: none  - Severity: + hx of hypoglycemic seizures  - Awareness: absent/decreased  - Treatment: candies in purse, glucose liquid shot, glucose tabs     Outpatient Diabetes Provider: [facility]: Dr. Maher (LOV 4/3/25)  Formal Diabetes Education/Educator: yes        Physical Exam:   BP 96/62 (BP Location: Right arm)   Pulse 70   Temp 97.6  F (36.4  C) (Oral)   Resp 16   Ht 1.575 m (5' 2\")   Wt 47.5 kg (104 lb 11.5 oz)   SpO2 99%   BMI 19.15 kg/m    General Appearance: cachectic, Alert and interactive   HEENT: Normocephalic, atraumatic.   Lungs:  Breathing comfortably  Abdomen: Round, distended. G/J tube in place   Extremities:  No significant lower extremity edema. Fluid movement of extremities.   Skin:  Warm and dry.   Psych:  Calm, appropriate mood and affect.           Data:     Lab Results   Component Value Date    " A1C 19.1 (H) 04/15/2025    A1C 18.9 (H) 04/03/2025    A1C 17.1 (H) 01/23/2025    A1C 11.1 (H) 03/02/2023    A1C 13.3 (H) 09/26/2022    A1C 7.3 (H) 11/09/2020    A1C 6.7 (A) 11/21/2019    A1C 8.2 (H) 06/11/2019    A1C Canceled, Test credited 06/10/2019    A1C 9.6 (H) 04/18/2019       ROUTINE IP LABS (Last four results)  BMP  Recent Labs   Lab 04/18/25  0507 04/18/25  0358 04/18/25  0254 04/18/25  0216 04/17/25  0636 04/17/25  0600 04/15/25  2354 04/15/25  2130   NA  --   --   --  136  --  138  --  123*   POTASSIUM  --   --   --  4.2  --  3.5  --  3.6   CHLORIDE  --   --   --  105  --  103  --  85*   ELIZA  --   --   --  8.6*  --  8.5*  --  8.9   CO2  --   --   --  23  --  27  --  23   BUN  --   --   --  6.6*  --  5.3*  --  6.1*   CR  --   --   --  0.42*  --  0.40*  --  0.35*   * 149* 153* 166*   < > 100*   < > 918*    < > = values in this interval not displayed.     CBC  Recent Labs   Lab 04/18/25  0216 04/15/25  2130   WBC 6.2 10.0   RBC 3.60* 3.81   HGB 11.3* 11.9   HCT 32.8* 33.4*   MCV 91 88   MCH 31.4 31.2   MCHC 34.5 35.6   RDW 12.6 11.9    330     INRNo lab results found in last 7 days.    Inpatient Diabetes Service will continue to follow, please don't hesitate to contact the team with any questions or concerns.     Romina Cornejo APRN, CNP   Inpatient Diabetes Management Service   Pager: [phone]   Available on Vocera      Plan discussed with patient, bedside RN, and primary team via this note.    To contact Inpatient Diabetes Service:     7 AM - 5 PM: Page the IDS DAVID following the patient that day (see filed or incomplete progress notes/consult notes under Endocrinology)    OR if uncertain of provider assignment: page job code [phone]    5 PM - 7 AM: First call after hours is to primary service.    For urgent after-hours questions, page job code for on call fellow: [phone]     I spent a total of 40 minutes on the date of the encounter doing prep/post-work, chart review, history and exam,  documentation and further activities per the note including lab review, multidisciplinary communication, counseling the patient and/or coordinating care regarding acute hyper/hypoglycemic management, as well as discharge management and planning/communication.

## 2025-04-18 NOTE — CODE/RAPID RESPONSE
Paged for hypotension 88/64 and 79/51.   Patient seen at bedside. Easily awaken and denies new symptoms    - CBC, BMP  - 500 ml normal saline bolus    Lilliam Bullock, CNP

## 2025-04-19 ENCOUNTER — APPOINTMENT (OUTPATIENT)
Dept: OCCUPATIONAL THERAPY | Facility: CLINIC | Age: 60
End: 2025-04-19
Payer: MEDICARE

## 2025-04-19 LAB
ALBUMIN UR-MCNC: NEGATIVE MG/DL
ANION GAP SERPL CALCULATED.3IONS-SCNC: 8 MMOL/L (ref 7–15)
APPEARANCE UR: CLEAR
BILIRUB UR QL STRIP: NEGATIVE
BLAIMP ISLT/SPM QL: NOT DETECTED
BLAKPC ISLT/SPM QL: NOT DETECTED
BLAOXA-48 ISLT/SPM QL: NOT DETECTED
BLAVIM ISLT/SPM QL: NOT DETECTED
BUN SERPL-MCNC: 11.3 MG/DL (ref 8–23)
CALCIUM SERPL-MCNC: 8.4 MG/DL (ref 8.8–10.4)
CHLORIDE SERPL-SCNC: 106 MMOL/L (ref 98–107)
COLOR UR AUTO: ABNORMAL
CREAT SERPL-MCNC: 0.55 MG/DL (ref 0.51–0.95)
EGFRCR SERPLBLD CKD-EPI 2021: >90 ML/MIN/1.73M2
ERYTHROCYTE [DISTWIDTH] IN BLOOD BY AUTOMATED COUNT: 12.9 % (ref 10–15)
GLUCOSE BLDC GLUCOMTR-MCNC: 100 MG/DL (ref 70–99)
GLUCOSE BLDC GLUCOMTR-MCNC: 101 MG/DL (ref 70–99)
GLUCOSE BLDC GLUCOMTR-MCNC: 109 MG/DL (ref 70–99)
GLUCOSE BLDC GLUCOMTR-MCNC: 110 MG/DL (ref 70–99)
GLUCOSE BLDC GLUCOMTR-MCNC: 113 MG/DL (ref 70–99)
GLUCOSE BLDC GLUCOMTR-MCNC: 114 MG/DL (ref 70–99)
GLUCOSE BLDC GLUCOMTR-MCNC: 119 MG/DL (ref 70–99)
GLUCOSE BLDC GLUCOMTR-MCNC: 127 MG/DL (ref 70–99)
GLUCOSE BLDC GLUCOMTR-MCNC: 128 MG/DL (ref 70–99)
GLUCOSE BLDC GLUCOMTR-MCNC: 130 MG/DL (ref 70–99)
GLUCOSE BLDC GLUCOMTR-MCNC: 134 MG/DL (ref 70–99)
GLUCOSE BLDC GLUCOMTR-MCNC: 134 MG/DL (ref 70–99)
GLUCOSE BLDC GLUCOMTR-MCNC: 141 MG/DL (ref 70–99)
GLUCOSE BLDC GLUCOMTR-MCNC: 141 MG/DL (ref 70–99)
GLUCOSE BLDC GLUCOMTR-MCNC: 146 MG/DL (ref 70–99)
GLUCOSE BLDC GLUCOMTR-MCNC: 150 MG/DL (ref 70–99)
GLUCOSE BLDC GLUCOMTR-MCNC: 150 MG/DL (ref 70–99)
GLUCOSE BLDC GLUCOMTR-MCNC: 151 MG/DL (ref 70–99)
GLUCOSE BLDC GLUCOMTR-MCNC: 164 MG/DL (ref 70–99)
GLUCOSE BLDC GLUCOMTR-MCNC: 178 MG/DL (ref 70–99)
GLUCOSE BLDC GLUCOMTR-MCNC: 181 MG/DL (ref 70–99)
GLUCOSE BLDC GLUCOMTR-MCNC: 232 MG/DL (ref 70–99)
GLUCOSE BLDC GLUCOMTR-MCNC: 97 MG/DL (ref 70–99)
GLUCOSE SERPL-MCNC: 124 MG/DL (ref 70–99)
GLUCOSE UR STRIP-MCNC: 200 MG/DL
HCO3 SERPL-SCNC: 22 MMOL/L (ref 22–29)
HCT VFR BLD AUTO: 30.1 % (ref 35–47)
HGB BLD-MCNC: 10.5 G/DL (ref 11.7–15.7)
HGB UR QL STRIP: NEGATIVE
KETONES UR STRIP-MCNC: NEGATIVE MG/DL
LEUKOCYTE ESTERASE UR QL STRIP: ABNORMAL
MAGNESIUM SERPL-MCNC: 2 MG/DL (ref 1.7–2.3)
MCH RBC QN AUTO: 31.9 PG (ref 26.5–33)
MCHC RBC AUTO-ENTMCNC: 34.9 G/DL (ref 31.5–36.5)
MCV RBC AUTO: 92 FL (ref 78–100)
MUCOUS THREADS #/AREA URNS LPF: PRESENT /LPF
NDM TARGET DNA: NOT DETECTED
NITRATE UR QL: NEGATIVE
PH UR STRIP: 5 [PH] (ref 5–7)
PHOSPHATE SERPL-MCNC: 3.5 MG/DL (ref 2.5–4.5)
PLATELET # BLD AUTO: 340 10E3/UL (ref 150–450)
POTASSIUM SERPL-SCNC: 3.8 MMOL/L (ref 3.4–5.3)
RBC # BLD AUTO: 3.29 10E6/UL (ref 3.8–5.2)
RBC URINE: 1 /HPF
SODIUM SERPL-SCNC: 136 MMOL/L (ref 135–145)
SP GR UR STRIP: 1.01 (ref 1–1.03)
SQUAMOUS EPITHELIAL: <1 /HPF
UROBILINOGEN UR STRIP-MCNC: NORMAL MG/DL
WBC # BLD AUTO: 5.2 10E3/UL (ref 4–11)
WBC CLUMPS #/AREA URNS HPF: PRESENT /HPF
WBC URINE: 17 /HPF

## 2025-04-19 PROCEDURE — 250N000011 HC RX IP 250 OP 636: Performed by: INTERNAL MEDICINE

## 2025-04-19 PROCEDURE — 99233 SBSQ HOSP IP/OBS HIGH 50: CPT

## 2025-04-19 PROCEDURE — 99207 PR APP CREDIT; MD BILLING SHARED VISIT: CPT

## 2025-04-19 PROCEDURE — 250N000011 HC RX IP 250 OP 636: Mod: JZ | Performed by: NURSE PRACTITIONER

## 2025-04-19 PROCEDURE — 250N000012 HC RX MED GY IP 250 OP 636 PS 637

## 2025-04-19 PROCEDURE — 87086 URINE CULTURE/COLONY COUNT: CPT

## 2025-04-19 PROCEDURE — 81001 URINALYSIS AUTO W/SCOPE: CPT

## 2025-04-19 PROCEDURE — 85014 HEMATOCRIT: CPT | Performed by: NURSE PRACTITIONER

## 2025-04-19 PROCEDURE — 87798 DETECT AGENT NOS DNA AMP: CPT | Performed by: PEDIATRICS

## 2025-04-19 PROCEDURE — 36569 INSJ PICC 5 YR+ W/O IMAGING: CPT

## 2025-04-19 PROCEDURE — 97535 SELF CARE MNGMENT TRAINING: CPT | Mod: GO | Performed by: OCCUPATIONAL THERAPIST

## 2025-04-19 PROCEDURE — 120N000002 HC R&B MED SURG/OB UMMC

## 2025-04-19 PROCEDURE — 250N000009 HC RX 250

## 2025-04-19 PROCEDURE — 250N000013 HC RX MED GY IP 250 OP 250 PS 637: Performed by: INTERNAL MEDICINE

## 2025-04-19 PROCEDURE — 99233 SBSQ HOSP IP/OBS HIGH 50: CPT | Mod: FS | Performed by: PEDIATRICS

## 2025-04-19 PROCEDURE — 84100 ASSAY OF PHOSPHORUS: CPT | Performed by: INTERNAL MEDICINE

## 2025-04-19 PROCEDURE — 250N000009 HC RX 250: Performed by: NURSE PRACTITIONER

## 2025-04-19 PROCEDURE — 36415 COLL VENOUS BLD VENIPUNCTURE: CPT | Performed by: NURSE PRACTITIONER

## 2025-04-19 PROCEDURE — 83735 ASSAY OF MAGNESIUM: CPT | Performed by: INTERNAL MEDICINE

## 2025-04-19 PROCEDURE — 272N000278 HC DEVICE 5FR SECURACATH

## 2025-04-19 PROCEDURE — 999N000128 HC STATISTIC PERIPHERAL IV START W/O US GUIDANCE

## 2025-04-19 PROCEDURE — 250N000013 HC RX MED GY IP 250 OP 250 PS 637

## 2025-04-19 PROCEDURE — 272N000451 HC KIT SHRLOCK 5FR POWER PICC DOUBLE LUMEN

## 2025-04-19 PROCEDURE — 80048 BASIC METABOLIC PNL TOTAL CA: CPT | Performed by: NURSE PRACTITIONER

## 2025-04-19 RX ORDER — HEPARIN SODIUM,PORCINE 10 UNIT/ML
5-15 VIAL (ML) INTRAVENOUS EVERY 24 HOURS
Status: DISCONTINUED | OUTPATIENT
Start: 2025-04-19 | End: 2025-04-24 | Stop reason: HOSPADM

## 2025-04-19 RX ORDER — CYCLOBENZAPRINE HCL 10 MG
10 TABLET ORAL DAILY
Status: ON HOLD | COMMUNITY
End: 2025-04-24

## 2025-04-19 RX ORDER — OXYCODONE HYDROCHLORIDE 5 MG/1
5 TABLET ORAL EVERY 4 HOURS PRN
Status: DISCONTINUED | OUTPATIENT
Start: 2025-04-19 | End: 2025-04-24 | Stop reason: HOSPADM

## 2025-04-19 RX ORDER — OXYCODONE HYDROCHLORIDE 10 MG/1
10 TABLET ORAL EVERY 4 HOURS PRN
Status: DISCONTINUED | OUTPATIENT
Start: 2025-04-19 | End: 2025-04-24 | Stop reason: HOSPADM

## 2025-04-19 RX ORDER — HEPARIN SODIUM,PORCINE 10 UNIT/ML
5-15 VIAL (ML) INTRAVENOUS
Status: DISCONTINUED | OUTPATIENT
Start: 2025-04-19 | End: 2025-04-24 | Stop reason: HOSPADM

## 2025-04-19 RX ORDER — KETOROLAC TROMETHAMINE 15 MG/ML
15 INJECTION, SOLUTION INTRAMUSCULAR; INTRAVENOUS EVERY 6 HOURS PRN
Status: DISCONTINUED | OUTPATIENT
Start: 2025-04-19 | End: 2025-04-24 | Stop reason: HOSPADM

## 2025-04-19 RX ORDER — LIDOCAINE 40 MG/G
CREAM TOPICAL
Status: ACTIVE | OUTPATIENT
Start: 2025-04-19 | End: 2025-04-22

## 2025-04-19 RX ORDER — LEVOTHYROXINE SODIUM 125 UG/1
125 TABLET ORAL
Status: DISCONTINUED | OUTPATIENT
Start: 2025-04-20 | End: 2025-04-24 | Stop reason: HOSPADM

## 2025-04-19 RX ORDER — LEVOTHYROXINE SODIUM 125 UG/1
125 TABLET ORAL
COMMUNITY

## 2025-04-19 RX ADMIN — DICYCLOMINE HYDROCHLORIDE 10 MG: 10 CAPSULE ORAL at 11:58

## 2025-04-19 RX ADMIN — INSULIN ASPART 6 UNITS: 100 INJECTION, SOLUTION INTRAVENOUS; SUBCUTANEOUS at 09:45

## 2025-04-19 RX ADMIN — POTASSIUM CHLORIDE 20 MEQ: 750 TABLET, EXTENDED RELEASE ORAL at 07:53

## 2025-04-19 RX ADMIN — DICYCLOMINE HYDROCHLORIDE 10 MG: 10 CAPSULE ORAL at 18:29

## 2025-04-19 RX ADMIN — SUCRALFATE 1 G: 1 TABLET ORAL at 22:25

## 2025-04-19 RX ADMIN — PROCHLORPERAZINE EDISYLATE 10 MG: 5 INJECTION INTRAMUSCULAR; INTRAVENOUS at 18:39

## 2025-04-19 RX ADMIN — SUCRALFATE 1 G: 1 TABLET ORAL at 07:54

## 2025-04-19 RX ADMIN — OXYCODONE HYDROCHLORIDE 5 MG: 5 TABLET ORAL at 05:51

## 2025-04-19 RX ADMIN — METOCLOPRAMIDE 10 MG: 5 TABLET ORAL at 13:47

## 2025-04-19 RX ADMIN — DULOXETINE 90 MG: 60 CAPSULE, DELAYED RELEASE ORAL at 07:53

## 2025-04-19 RX ADMIN — METOCLOPRAMIDE 10 MG: 5 TABLET ORAL at 19:24

## 2025-04-19 RX ADMIN — GABAPENTIN 100 MG: 100 CAPSULE ORAL at 19:24

## 2025-04-19 RX ADMIN — OXYCODONE HYDROCHLORIDE 5 MG: 5 TABLET ORAL at 11:58

## 2025-04-19 RX ADMIN — LEVOTHYROXINE SODIUM 112 MCG: 0.11 TABLET ORAL at 07:54

## 2025-04-19 RX ADMIN — THIAMINE HCL TAB 100 MG 100 MG: 100 TAB at 07:53

## 2025-04-19 RX ADMIN — TRAZODONE HYDROCHLORIDE 100 MG: 100 TABLET ORAL at 22:25

## 2025-04-19 RX ADMIN — HYDROCORTISONE 15 MG: 10 TABLET ORAL at 22:25

## 2025-04-19 RX ADMIN — CYCLOBENZAPRINE HYDROCHLORIDE 5 MG: 5 TABLET, FILM COATED ORAL at 13:48

## 2025-04-19 RX ADMIN — KETOROLAC TROMETHAMINE 15 MG: 15 INJECTION, SOLUTION INTRAMUSCULAR; INTRAVENOUS at 18:34

## 2025-04-19 RX ADMIN — METOCLOPRAMIDE 10 MG: 5 TABLET ORAL at 07:53

## 2025-04-19 RX ADMIN — TOPIRAMATE 100 MG: 50 TABLET, FILM COATED ORAL at 19:24

## 2025-04-19 RX ADMIN — PANTOPRAZOLE SODIUM 40 MG: 40 TABLET, DELAYED RELEASE ORAL at 07:54

## 2025-04-19 RX ADMIN — LIDOCAINE 4% 3 PATCH: 40 PATCH TOPICAL at 07:54

## 2025-04-19 RX ADMIN — HYDROCORTISONE 10 MG: 10 TABLET ORAL at 07:53

## 2025-04-19 RX ADMIN — DICYCLOMINE HYDROCHLORIDE 10 MG: 10 CAPSULE ORAL at 05:51

## 2025-04-19 RX ADMIN — INSULIN HUMAN 7 UNITS: 100 INJECTION, SUSPENSION SUBCUTANEOUS at 21:43

## 2025-04-19 RX ADMIN — POLYETHYLENE GLYCOL 3350 17 G: 17 POWDER, FOR SOLUTION ORAL at 07:55

## 2025-04-19 RX ADMIN — INSULIN HUMAN 0.5 UNITS/HR: 1 INJECTION, SOLUTION INTRAVENOUS at 06:54

## 2025-04-19 RX ADMIN — GABAPENTIN 100 MG: 100 CAPSULE ORAL at 13:47

## 2025-04-19 RX ADMIN — PANTOPRAZOLE SODIUM 40 MG: 40 TABLET, DELAYED RELEASE ORAL at 19:24

## 2025-04-19 RX ADMIN — OXYCODONE HYDROCHLORIDE 10 MG: 10 TABLET ORAL at 20:20

## 2025-04-19 RX ADMIN — OXYCODONE HYDROCHLORIDE 10 MG: 10 TABLET ORAL at 16:11

## 2025-04-19 RX ADMIN — PANCRELIPASE 7 CAPSULE: 60000; 12000; 38000 CAPSULE, DELAYED RELEASE PELLETS ORAL at 09:45

## 2025-04-19 RX ADMIN — SUCRALFATE 1 G: 1 TABLET ORAL at 11:58

## 2025-04-19 RX ADMIN — ACETAMINOPHEN 975 MG: 325 TABLET, FILM COATED ORAL at 16:11

## 2025-04-19 RX ADMIN — PANCRELIPASE 7 CAPSULE: 60000; 12000; 38000 CAPSULE, DELAYED RELEASE PELLETS ORAL at 18:33

## 2025-04-19 RX ADMIN — TOPIRAMATE 100 MG: 50 TABLET, FILM COATED ORAL at 07:53

## 2025-04-19 RX ADMIN — Medication 15 ML: at 07:53

## 2025-04-19 RX ADMIN — SUCRALFATE 1 G: 1 TABLET ORAL at 16:11

## 2025-04-19 RX ADMIN — PANCRELIPASE 7 CAPSULE: 60000; 12000; 38000 CAPSULE, DELAYED RELEASE PELLETS ORAL at 13:47

## 2025-04-19 RX ADMIN — LIDOCAINE HYDROCHLORIDE ANHYDROUS 2 ML: 10 INJECTION, SOLUTION INFILTRATION at 15:11

## 2025-04-19 RX ADMIN — GABAPENTIN 100 MG: 100 CAPSULE ORAL at 07:54

## 2025-04-19 RX ADMIN — ACETAMINOPHEN 975 MG: 325 TABLET, FILM COATED ORAL at 07:53

## 2025-04-19 RX ADMIN — CYCLOBENZAPRINE HYDROCHLORIDE 5 MG: 5 TABLET, FILM COATED ORAL at 18:43

## 2025-04-19 RX ADMIN — FAMOTIDINE 20 MG: 20 TABLET, FILM COATED ORAL at 22:25

## 2025-04-19 RX ADMIN — LINACLOTIDE 145 MCG: 145 CAPSULE, GELATIN COATED ORAL at 07:54

## 2025-04-19 ASSESSMENT — ACTIVITIES OF DAILY LIVING (ADL)
ADLS_ACUITY_SCORE: 62
ADLS_ACUITY_SCORE: 60
ADLS_ACUITY_SCORE: 62
ADLS_ACUITY_SCORE: 62
ADLS_ACUITY_SCORE: 60
ADLS_ACUITY_SCORE: 62
ADLS_ACUITY_SCORE: 60
ADLS_ACUITY_SCORE: 60
ADLS_ACUITY_SCORE: 62

## 2025-04-19 NOTE — PHARMACY-ADMISSION MEDICATION HISTORY
Pharmacy Intern Admission Medication History    Admission medication history is complete. The information provided in this note is only as accurate as the sources available at the time of the update.    Information Source(s): Patient and CareEverywhere/SureScripts via in-person    Pertinent Information:   Obtained medication information from the patient who was a good historian.  Per patient, she consistently takes all her medication and took all her scheduled doses prior to admission.  Patient uses DEXCOM CGM for insulin dosing and insulin pump to continuously infuse. Patient also injects insulin glargine twice daily (18 units in the morning and 12 units at night).  CREON 67666 unit capsule - on a daily basis, patient reports taking 8 capsules with meals and 6 capsules with snacks.    Changes made to PTA medication list:  Added: None  Deleted:   cyclobenzaprine (FLEXERIL) 5 MG tablet (duplicate)  Hydrocortisone 1% external cream   CREON capsules (duplicate)  Ondansetron ODT 4 mg tablet  Changed:   cyclobenzaprine (FLEXERIL) 10 MG tablet: Take 0.5 tablet (5 mg) by mouth daily --> Take 10 mg by mouth daily.  levothyroxine (SYNTHROID/LEVOTHROID) 112 MCG tablet: Take 112 mcg by mouth every morning (before breakfast) --> levothyroxine (SYNTHROID/LEVOTHROID) 125 MCG tablet: Take 125 mcg by mouth every morning (before breakfast).    Allergies reviewed with patient and updates made in EHR: yes    Medication History Completed By: Liliya Naylor 4/19/2025 2:21 PM    PTA Med List   Medication Sig Note Last Dose/Taking    acetaminophen (TYLENOL) 325 MG tablet Take 3 tablets (975 mg) by mouth every 8 hours  Taking    alendronate (FOSAMAX) 70 MG tablet Take 1 tablet (70 mg) by mouth every 7 days On Sundays take first thing in the morning with plain water and remain upright for at least 30 minutes and until after first food of the day    Do not restart Fosamax (alendronate) until your difficulty swallowing has resolved and you  have finished the entire course of fluconazole (Diflucan).  Taking    alum & mag hydroxide-simethicone (MYLANTA/MAALOX) 200-200-25 MG CHEW chewable tablet Take 1 tablet by mouth 3 times daily as needed for indigestion  Taking As Needed    amylase-lipase-protease (CREON 12) 48550 units CPEP Take 6 to 8 capsules by mouth with meals and take 4 capsules with snacks. Needs up to 25 capsules per day 4/19/2025: Patient currently takes 8 capsules with meals and 6 capsules with snacks. Taking    cyclobenzaprine (FLEXERIL) 10 MG tablet Take 10 mg by mouth daily.  Taking    diclofenac (FLECTOR) 1.3 % Patch Place 1 patch onto the skin 2 times daily  Taking    diclofenac (VOLTAREN) 1 % GEL 2 g Apply 2 g to skin four times a day as needed (to affected upper extremity joint(s)). Maximum 8g/day per joint, 16g/day total.  Taking    dicyclomine (BENTYL) 10 MG capsule Take 10 mg by mouth every 6 hours  Taking    dronabinol (MARINOL) 2.5 MG capsule Take 2 capsules (5 mg) by mouth 2 times daily as needed  Taking As Needed    DULoxetine (CYMBALTA) 30 MG capsule Take 1 capsule (30 mg) by mouth daily With 60mg capsule for total dose of 90mg  Taking    DULoxetine (CYMBALTA) 60 MG EC capsule Take 1 capsule (60 mg) by mouth daily With 30mg capsule for total dose of 90mg  Taking    famotidine (PEPCID) 20 MG tablet Take 1 tablet (20 mg) by mouth At Bedtime  Taking    Glucagon (GVOKE HYPOPEN 2-PACK) 1 MG/0.2ML pen Inject the contents of 1 device under the skin into lower abdomen, outer thigh, or outer upper arm as needed for hypoglycemia. If no response after 15 minutes, additional 1 mg dose from a new device may be injected while waiting for emergency assistance.  Taking    hydrocortisone (CORTEF) 10 MG tablet Take 10 mg in the morning and 10 mg along with a 5 mg tablet at bedtime for a total dose of 15 mg at bedtime. Watch for hypoglycemia recurrence.  Taking    hydrocortisone (CORTEF) 5 MG tablet Take 5 mg by mouth At Bedtime along with a 10  mg tablet for a total dose of 15 mg  Taking    hydrocortisone sodium succinate PF (SOLU-CORTEF) 100 MG injection Inject 100mg (1 mL) into muscle in emergency or unable to take oral hydrocortisone. Go to emergency room if given. ActoVial NDC [address]-10. Note to pharmacy: Provide two 3ml syringes with 23g 1 inch needles.  Taking    insulin aspart (NOVOPEN ECHO) 100 UNIT/ML cartridge Use 1 unit per 16 grams carb and 1 unit per 50 >120, or as directed, up to 80 units/day.  Taking    insulin aspart (NOVOPEN ECHO) 100 UNIT/ML cartridge As  instructed per physician  Taking    insulin glargine (LANTUS PEN) 100 UNIT/ML pen Take 16 units in the morning, 12 units at night, and adjust as needed based on MD instructions. 4/19/2025: Patient currently injects 18 units in the morning and 12 units at night.  Taking    levothyroxine (SYNTHROID/LEVOTHROID) 125 MCG tablet Take 125 mcg by mouth every morning (before breakfast).  Taking    linaclotide (LINZESS) 145 MCG capsule Take 145 mcg by mouth every morning (before breakfast) as needed  Taking    metoclopramide (REGLAN) 5 MG tablet Take 2 tablets (10 mg) by mouth 3 times daily  Taking    Nutritional Supplements (BOOST HIGH PROTEIN) LIQD After above baseline labs are drawn, give: 6 mL/kg to maximum of 360 mL; the beverage is to be consumed within 5 minutes. (Patient taking differently: as needed. After above baseline labs are drawn, give: 6 mL/kg to maximum of 360 mL; the beverage is to be consumed within 5 minutes.)  Taking Differently    ondansetron (ZOFRAN) 4 MG tablet Take 1 tablet (4 mg) by mouth every 8 hours as needed for nausea  Past Week    pantoprazole (PROTONIX) 40 MG EC tablet Take 1 tablet (40 mg) by mouth 2 times daily  Taking    polyethylene glycol (MIRALAX/GLYCOLAX) packet Take 1 packet by mouth 2 times daily.  Taking    potassium chloride ER (KLOR-CON M) 20 MEQ CR tablet Take 1 tablet (20 mEq) by mouth daily  Taking    senna-docusate (SENOKOT-S/PERICOLACE) 8.6-50  MG tablet Take 1-2 tablets by mouth daily as needed for constipation  Taking As Needed    sodium chloride (OCEAN) 0.65 % nasal spray Spray 1 spray into both nostrils daily as needed for congestion  Taking As Needed    sucralfate (CARAFATE) 1 GM tablet Take 1 tablet (1 g) by mouth 4 times daily (before meals and nightly)  Taking    SUMAtriptan (IMITREX) 50 MG tablet Take 1 tablet (50 mg) by mouth at onset of headache for migraine Take 1 Tab by mouth Once as needed for Migraine Headache. May repeat after two hours.  Maximum dose 200 mg/24 hours.  Past Week    topiramate (TOPAMAX) 100 MG tablet Take 1 tablet (100 mg) by mouth 2 times daily  Taking    traMADol (ULTRAM) 50 MG tablet Take 0.5-1 tablets (25-50 mg) by mouth every 6 hours as needed for moderate pain  Past Week    traZODone (DESYREL) 100 MG tablet TAKE 1-2 TABLETS BY MOUTH AN HOUR BEFORE BEDTIME  Past Week

## 2025-04-19 NOTE — PLAN OF CARE
Goal Outcome Evaluation:   PRIMARY DIAGNOSIS: HYPO/HYPERGLYCEMIA    OUTPATIENT/OBSERVATION GOALS TO BE MET BEFORE DISCHARGE  BG greater than 100 and less than 250 on two consecutive readings: Yes  Recent Labs   Lab Test 04/19/25  1340 04/19/25  1304 04/19/25  1144   * 130* 110*       Ketones absent from urine  (hyperglycemia): Yes    Tolerating oral intake to maintain hydration: Yes    Return to near baseline physical activity: No    Discharge Planner Nurse   Safe discharge environment identified: No  Barriers to discharge: Yes       Entered by: Juanita Carey RN 04/19/2025 2:02 PM     Please review provider order for any additional goals.   Nurse to notify provider when observation goals have been met and patient is ready for discharge.

## 2025-04-19 NOTE — PROGRESS NOTES
Paged for insulin algorithm change    Previous provider paged for change to algorithm 2. However, patient's tube feeding was decreased in the evening due to abdominal pain. Nursing states that they are unable to change algorithm without provider review of blood sugars.  Paged at 0120 as patients blood sugar was 170 and need for advancing the algorithm. No change in algorithm due to lowered tube feedings rate. Paged at 0230 due to blood sugar at 128. Changed to algorithm 1. Paged at 0330, blood sugar 101. Opted to stop insulin drip, it stops at 99 and she received lantus last evening    - Continue to monitor blood sugars  - Continue algorithm 1    Lilliam Bullock, CNP

## 2025-04-19 NOTE — PROGRESS NOTES
"Shift: 2134-2577      Goal Outcome Evaluation:      /82 (BP Location: Right arm)   Pulse 86   Temp 98.2  F (36.8  C) (Oral)   Resp 16   Ht 1.575 m (5' 2\")   Wt 47.5 kg (104 lb 11.5 oz)   SpO2 96%   BMI 19.15 kg/m      NEURO: WDL, A&O x4, able to make needs known, calls appropriately  RESPIRATORY: WDL, RA, denies SOB   CARDIAC: X--hypotensive, denies chest pain  GI/: X--PEG/J-tube site, some drainage smells like food (see provider notification), voiding spontaneously. Dressing changed, malodorous  DIET: Regular  PAIN/NAUSEA: Intermittent, pain rated at 10/10 despite medication administration. Paged provider for IV dose. 1 time dilaudid brought pain down to 6/10. Patient sleeping. INCISION/DRAINS: PEG/J-tube site. 150 ml red tinged output. Clear.   IV ACCESS: R PIV SL, L PIV infusing insulin according to algorithm and provider orders. See mar/progress notes for detailed report  ACTIVITY: Independent/ stand by. Pivot to commode  LAB: Mag, Phos, CBC, BMP reviewed    PLAN: Tube feed decreased 20 ml/hr due to pain at 9:30 pm, continued insulin drip, severe pain radiating from back to low abdomen--described as \"stabbing\"- improved with dilaudid IV x1; 1 5mg oxy given in the AM. advanced insulin drip to algo 2. Then later after 2 am decreased to algorithm. At 5:30 am pt at goal rate for 4 glucose checks.     Azeb Sandoval RN on 4/19/2025 at 6:41 AM      Latest Reference Range & Units 04/18/25 19:30 04/18/25 20:29 04/18/25 21:34 04/18/25 22:40 04/18/25 23:32 04/19/25 00:31 04/19/25 01:34 04/19/25 02:29 04/19/25 03:24 04/19/25 04:33 04/19/25 05:21   GLUCOSE BY METER POCT 70 - 99 mg/dL 213 (H) 195 (H) 198 (H) 174 (H) 170 (H) 178 (H) 151 (H) 128 (H) 101 (H) 97 100 (H)   (H): Data is abnormally high  "

## 2025-04-19 NOTE — PROVIDER NOTIFICATION
Provider notified for:    Algorithm change from 1 to 2. Orders received for changing insulin algorithm.  For 8/10 pain and malodorous G tube drainage. Orders received for IV dilaudid one time dose, and TF rate decrease at 9:30 pm. Xray with contrast ordered as well.

## 2025-04-19 NOTE — PROVIDER NOTIFICATION
7B Chantell Kidd room 23 Patient rates pain 10/10. Oxy given 1611 q4h. Can you order dilaudid. Teo

## 2025-04-19 NOTE — PROCEDURES
[facility]    Double Lumen PICC Placement    Date/Time: 4/19/2025 3:23 PM    Performed by: Olive Hurst RN  Authorized by: Jahaira Ivey NP  Indications: vascular access      UNIVERSAL PROTOCOL   Site Marked: Yes  Prior Images Obtained and Reviewed:  Yes  Required items: Required blood products, implants, devices and special equipment available    Patient identity confirmed:  Verbally with patient, arm band, provided demographic data and hospital-assigned identification number  Patient was reevaluated immediately before administering moderate or deep sedation or anesthesia  Confirmation Checklist:  Patient's identity using two indicators, relevant allergies, procedure was appropriate and matched the consent or emergent situation and correct equipment/implants were available  Time out: Immediately prior to the procedure a time out was called    Universal Protocol: the [facility] Universal Protocol was followed    Preparation: Patient was prepped and draped in usual sterile fashion       ANESTHESIA    Anesthesia:  See MAR for details  Local Anesthetic:  Lidocaine 1% without epinephrine  Anesthetic Total (mL):  2      SEDATION    Patient Sedated: No        Preparation: skin prepped with ChloraPrep  Skin prep agent: skin prep agent completely dried prior to procedure  Sterile barriers: maximum sterile barriers were used: cap, mask, sterile gown, sterile gloves, and large sterile sheet  Hand hygiene: hand hygiene performed prior to central venous catheter insertion  Type of line used: PICC  Catheter type: double lumen  Lumen type: non-valved and power PICC  Catheter size: 5 Fr  Brand: Bard  Lot number: [id number]  Placement method: venipuncture, MST, ultrasound and tip navigation system  Number of attempts: 1  Difficulty threading catheter: no  Successful placement: yes  Orientation: right  Catheter to Vein (%): 38  Location: brachial vein (medial) (vein  "diameter- 0.46cm)  Tip Location: SVC  Arm circumference: adults 10 cm  Extremity circumference: 19.5  Visible catheter length: 3  Total catheter length: 38  Dressing and securement: alcohol impregnated caps, blood cleaned with CHG, chlorhexidine disc applied, site cleansed, subcutaneous anchor securement system, sterile dressing applied and transparent dressing  Post procedure assessment: blood return through all ports, free fluid flow and placement verified by 3CG technology  PROCEDURE Describe Procedure: PICC placement verified by [employer] 3CG tip confirmation system. PICC okay to use.    This patient's catheter is secured with SecurAcath instead of a traditional suture or adhesive based securement. This is a subcutaneous anchor securement system (aka APOLINAR or SecurAcath) that holds the catheter just below the skin with nitinol feet and a friction fit  around the catheter.  It can stay with the catheter until the catheter is removed.    Do not open, change or remove the SecurAcath.  SecurAcath may be disinfected during a dressing change, but do not open or remove it.  SecurAcath acts like a hinge - lift, clean 360 degrees around, let dry and apply new dressing.  SecurAcath is compatible with all dressings and antiseptic agents.  Ensure the wings of the catheter and SecurAcath are completely under the boarder of the dressing.  SecurAcath is MRI safe to 3T, see IFU for details.  A Statlock is not necessary when SecurAcath is present.   Patients can go to MRI with SecurAcath device in place  When the catheter is indicated for removal, enter \"Nursing to Consult for Vascular Access Care\" order in [facility], watch the removal video on 10X10 RoomPoint, or call for training if you have not removed before. 24 hour SecurAcath Clinical Education and Support  [phone]    Disposal: sharps and needle count correct at the end of procedure, needles and guidewire disposed in sharps container  Patient Tolerance:  Patient tolerated the " procedure well with no immediate complications

## 2025-04-19 NOTE — PROGRESS NOTES
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 Inpatient Diabetes Service consulted for glycemic management recommendations.          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 Poor control  (A1c 19.1 % 4/16/25, Hgb: 11.9)  Approaching HHS [glucose 918, calculated serum osmolality 299, ketones 1.51, vpH 7.43]    Plan/Recommendations:   - IV insulin drip non-DKA protocol as TF advance today- will transition off this evening   - Reduce Lantus 9--> 4 units q 24 hrs at 1800  - Start NPH insulin to cover tube feeds at 2100- stop IV insulin 2 hours after NPH is given    If TF rate at 20 ml per hour, give 3 units  If TF rate at 30 ml per hour, give 5 units  If TF rate from 40-45 ml/hr, give 7 units   - Increase Novolog Meal Coverage: 1 per 15 --> 1 per 12 grams CHO, TID AC and PRN with snacks/supplements  - Start Novolog Correction Scale: 1 per 50 >140 q 4 hrs after IV insulin discontinued.   - PRN D10: Start if NPH given and tube feeds stopped/interrupted at 40 ml/hr for hypoglycemia prevention. OR start for low BG <80 at 10 ml/hr and titrate to keep -120   - BG Monitoring: Every 1-2 hours on insulin drip --> Then q 4 hrs on continuous tube feeds   - Hypoglycemia protocol  - Carb counting protocol     Discussion:   IV insulin needs increasing with PO intake during the day yesterday in addition to TF advancement, but was also on dextrose fluids until about 1600. Overnight, patient reported abdominal  pain and TF rate was decreased from 40 to 20 ml/hr. Insulin drip needs lessened and drip was turned off. With 9 units of lantus, glycemic control is tight and Lantus dosing is likely covering her TF needs now that D10 drip is turned off. IV insulin resumed when BG >100 and has been trending well at about 0.5 units per hour.     Will decrease lantus to 4 units today and maintain as patient's basal dosing. Transition to BID NPH for tube feeds which can be more easily held or adjusted if TF rate changes. While on 30 ml/hr getting 4.14 g CHO per hour required an average of 0.5 units per hour of IV insulin, or about a carb ratio of 1:8. For safety as trends have been tight will start with a carb ratio of 1:10 for the tube feeds. With rate of 30 ml/hr would be 5 units of NPH BID, for tube feeds at 40 ml/hr about 7 units BID and for tube feeds at 45 is closer to 8 units BID. Also have reduction of Lantus for safety. Plan discussed with RN and dosing range provided for NPH should tube feed rate be adjusted prior to administration.     TF to start this afternoon. [employer] Peptide 1.5 provides:  10 mL/hr = 1.38 g/hr  20 mL/hr = 2.76 g/hr  30 mL/hr = 4.14 g/hr  40 mL/hr = 5.52 g/hr  45 mL/hr = 6.21 g/hr    Discharge Planning: (tentative)  Medications:  TBD    Test Claims:  ordered 4/18 for NPH, Lantus, CGM   Contacted discharge pharmacy regarding dexcom sensors. Pt needs to go through specialty pharmacy for dexcom fills as they are not covered under her Medicare Part D plan.   Education:  Needs to be assessed closer to discharge.    Outpatient Follow-up:  recommend [facility]; next scheduled 5/13/25 with Libby Jay RN and 8/21/25 with Dr. Maher    Please notify Inpatient Diabetes Service if changes are planned to steroids, nutrition, TPN/TF and anticipated procedures requiring prolonged NPO status.         Interval History/Review of Systems :   The last 24 hours progress and nursing notes reviewed.  Awake  and alert. Trying to eat more but intake is low. Severe pain overnight and slept poorly, slightly better today. Some nausea, venting G tube but tolerating advancements of tube feeds today.     Planned Procedures/Surgeries: PEGJ exchange 4/17 vs separate PEG and PEJ    Inpatient Glucose Control:       Recent Labs   Lab 04/19/25  1605 04/19/25  1517 04/19/25  1340 04/19/25  1304 04/19/25  1144 04/19/25  1057   * 141* 114* 130* 110* 150*             Medications Impacting Glycemia:   Steroids:  hydrocortisone 10 mg am, 15 mg HS (adrenal insufficiency)    D5W-containing solutions/medications: none   Other medications impacting glucose: none         Nutrition:   Orders Placed This Encounter      Regular Diet Adult    Supplements: none   TF: Goal TF: [employer] Peptide 1.5 (or equivalent) @ 45 mL/hr (1080 mL/day) to provide  149 g CHO daily   - 4/17: [employer] Peptide 1.5 @ 10mL/hr   4/18: [employer] Peptide 1.5 @ 20 - 40 mL/hr --> reduced back to 20 ml/hr at 2100  4/19: [employer] Peptide 1.5 @ 20 ml/hr--> advanced to 30 ml/hr at 0800 and will increase to 40 ml/hr this evening   TPN: none         Diabetes History: see full consult note for complete diabetes history   Diabetes Type and Duration: Pancreatogenic diabetes s/p total pancreatectomy and islet autotransplant with insulin dependence since 1/2012  GAD65 antibody, zinc transporter 8 antibody, islet antibody, insulin antibody not available on epic search.     Numerous C-peptides:  Component      Latest Ref Rng 8/28/2018  6:47 AM 9/3/2018  6:41 PM 9/6/2018  8:00 AM 9/7/2018  6:55 AM 4/18/2019  11:04 AM 4/3/2025  2:01 PM   C-Peptide      0.9 - 6.9 ng/mL 0.3 (L)  0.2 (L)  1.8  2.8  1.8  0.5 (L)    Patient Fasting?           Yes          PTA Medication Regimen:  started new pens  - Lantus 18 units AM, 12 units PM  - Novolog ICR 1:16  - Novolog correction 1:35 (more intuitive dosing)  Missing doses?: denies  Historical Diabetes Medications: MDI, insulin pumps    "  Glucose monitoring device/frequency/trends: Has not picked up dexcom yet--does not think it has been filled. Accucheck (checking 4-6 times daily) running 400 to \"high\" generally  Hypoglycemia PTA:   - Frequency: none  - Severity: + hx of hypoglycemic seizures  - Awareness: absent/decreased  - Treatment: candies in purse, glucose liquid shot, glucose tabs     Outpatient Diabetes Provider: [facility]: Dr. Maher (LOV 4/3/25)  Formal Diabetes Education/Educator: yes        Physical Exam:   /70 (BP Location: Right arm)   Pulse 88   Temp 97.9  F (36.6  C) (Oral)   Resp 16   Ht 1.575 m (5' 2\")   Wt 49.7 kg (109 lb 9.1 oz)   SpO2 99%   BMI 20.04 kg/m      General Appearance: cachectic, Alert and interactive   HEENT: Normocephalic, atraumatic.   Lungs:  Breathing comfortably  Abdomen: Round, distended. G/J tube in place   Extremities:  No significant lower extremity edema. Fluid movement of extremities.   Skin:  Warm and dry.   Psych:  Calm, appropriate mood and affect.           Data:     Lab Results   Component Value Date    A1C 19.1 (H) 04/15/2025    A1C 18.9 (H) 04/03/2025    A1C 17.1 (H) 01/23/2025    A1C 11.1 (H) 03/02/2023    A1C 13.3 (H) 09/26/2022    A1C 7.3 (H) 11/09/2020    A1C 6.7 (A) 11/21/2019    A1C 8.2 (H) 06/11/2019    A1C Canceled, Test credited 06/10/2019    A1C 9.6 (H) 04/18/2019       ROUTINE IP LABS (Last four results)  BMP  Recent Labs   Lab 04/19/25  1605 04/19/25  1517 04/19/25  1340 04/19/25  1304 04/19/25  0653 04/19/25  0645 04/18/25  0254 04/18/25  0216 04/17/25  0636 04/17/25  0600 04/15/25  2354 04/15/25  2130   NA  --   --   --   --   --  136  --  136  --  138  --  123*   POTASSIUM  --   --   --   --   --  3.8  --  4.2  --  3.5  --  3.6   CHLORIDE  --   --   --   --   --  106  --  105  --  103  --  85*   ELIZA  --   --   --   --   --  8.4*  --  8.6*  --  8.5*  --  8.9   CO2  --   --   --   --   --  22  --  23  --  27  --  23   BUN  --   --   --   --   --  11.3  --  " 6.6*  --  5.3*  --  6.1*   CR  --   --   --   --   --  0.55  --  0.42*  --  0.40*  --  0.35*   * 141* 114* 130*   < > 124*   < > 166*   < > 100*   < > 918*    < > = values in this interval not displayed.     CBC  Recent Labs   Lab 04/19/25  0645 04/18/25  0216 04/15/25  2130   WBC 5.2 6.2 10.0   RBC 3.29* 3.60* 3.81   HGB 10.5* 11.3* 11.9   HCT 30.1* 32.8* 33.4*   MCV 92 91 88   MCH 31.9 31.4 31.2   MCHC 34.9 34.5 35.6   RDW 12.9 12.6 11.9    361 330     INRNo lab results found in last 7 days.    Inpatient Diabetes Service will continue to follow, please don't hesitate to contact the team with any questions or concerns.     BEE Yuen, CNP   Inpatient Diabetes Management Service   Pager: [phone]   Available on Vocera      Plan discussed with patient, bedside RN, and primary team via this note.    To contact Inpatient Diabetes Service:     7 AM - 5 PM: Page the IDS DAVID following the patient that day (see filed or incomplete progress notes/consult notes under Endocrinology)    OR if uncertain of provider assignment: page job code [phone]    5 PM - 7 AM: First call after hours is to primary service.    For urgent after-hours questions, page job code for on call fellow: [phone]     I spent a total of 60 minutes on the date of the encounter doing prep/post-work, chart review, history and exam, documentation and further activities per the note including lab review, multidisciplinary communication, counseling the patient and/or coordinating care regarding acute hyper/hypoglycemic management, as well as discharge management and planning/communication.

## 2025-04-19 NOTE — PROGRESS NOTES
CP- and Candida auris screening information    This patient was admitted to a hospital or long-term care facility in a high risk area in the prior 12 months. and is potentially at a high risk for carrying CP- and/or Candida auris. The [place] Department of Health ([facility]) and CDC recommend that we test this patient to prevent the spread of these organisms within our healthcare facility. Testing is voluntary and includes collecting an axilla and groin swab for C. auris and a rectal swab for CP-. Due to the potential transmission of these organisms in healthcare settings, Infection Prevention requires that this patient be placed in a private room on Contact Precautions until testing is complete. While the Candida auris testing is pending, bleach or an approved disinfectant (e.g. PDI Prime) should be used clean the patient s room and equipment.      Please notify Infection Prevention if the patient declines testing.  Additional information and resources can be found on the Infection Prevention [employer] Sharepoint page.     4/19/2025  Cyn Lezama, Infection Prevention

## 2025-04-19 NOTE — PROVIDER NOTIFICATION
Paged provider about malodorous drainage coming from G/J site. Provider responded indicating that they would take a look once pt is transferred to .    Juanita Carey RN on 4/19/2025 at 5:31 PM

## 2025-04-19 NOTE — PROGRESS NOTES
Multiple PIV attempts for 2 PIV; only able to place 1 PIV with and without ultrasound.    Patient wants to consider a PICC; due too poor vasculature and needing 2 points of access.     Bedside RN at bedside/aware and will talk to team.

## 2025-04-19 NOTE — PROGRESS NOTES
"Assumed care of patient: [id number]    /70 (BP Location: Right arm)   Pulse 88   Temp 97.9  F (36.6  C) (Oral)   Resp 16   Ht 1.575 m (5' 2\")   Wt 49.7 kg (109 lb 9.1 oz)   SpO2 99%   BMI 20.04 kg/m      NEURO: WDL, A&O x4, able to make needs known, calls appropriately  RESPIRATORY: WDL, RA, denies SOB   CARDIAC: X--hypotensive, denies chest pain  GI/: X--G/J-tube site, some drainage (provider paged), voiding spontaneously  DIET: Regular  PAIN/NAUSEA: Intermittent, pain rated at 10/10 despite medication administration. Multiple attempts made for alternative therapies with no result   INCISION/DRAINS: G/J-tube site  IV ACCESS: R PICC SL, L PIV infusing insulin at 0.5 units/hr  ACTIVITY: Independent/SBA  LAB: Mag, Phos, CBC, BMP  PLAN: Tube feed to increase to 45mL/hr at 1 AM, continue insulin drip with tentative plans to stop at 10PM per Endocrinology, updated carb coverage and Lantus, severe pain radiating from back to low abdomen--described as \"stabbing\"; lidocaine patches in place on back, do not advance insulin drip past Algorithm 1 without first consulting provider. Pt transferred to 7B.    "

## 2025-04-19 NOTE — PROGRESS NOTES
[facility]    Medicine Progress Note - Hospitalist Service    Date of Admission:  4/15/2025    Assessment & Plan   Patient is a 59-year-old female admitted on 4/15/2025 with a significant past medical history of insulin-dependent diabetes mellitus after pancreatectomy, chronic pancreatitis, migraine, hypothyroidism, and G tube nutrition who presented to the ED with a displaced  tube and was admitted for IR replacement of the G tube.     Updates Today:  - experiencing significant abdominal pain overnight in LLQ and left flank; unchanged with venting, tube feeds, or PO feeds. Said she had a loose BM yesterday, denied urinary symptoms    - obtain UA   - follow updated endocrine recs   - follow updated GI recs    PEG tube displacement   Bloating  Patient reports that her G tube became dislodged approximately one day prior to admission with an audible pop. She notes distention of her abdomen with bloating and pain. IR was consulted for GJ replacement however their team is unable to replace GJ tube d/t need for general anesthesia and challenging exchange. PEG-J exchange with GI on 4/17.   - general surgery consulted, appreciate assistance   - GI luminal consulted, appreciate assistance   - vent g-tube to help with distention   - if continued abdominal pain/nausea, obtain AXR to ensure PEG-J in correct place   - G-tube for meds/venting, J-tube for feeds/flushes      Post-pancreatectomy diabetes (type 1)  TPAIT (2012)  She was previously seen outpatient by both endocrinology and PCP. Her A1c on 1/23 was extremely high at 17.1, repeat A1c 19.1% on 4/15/25. Her glucose values have ranged in the 400s to 900s outpatient. She was seen by endocrinology, who report that it is unlikely she is taking her dose of insulin at this A1c and recommended exchange for new insulin. On admission, glucose was in the 500s, though patient had no signs of DKA. Per assessment of her endocrinologist,  this is likely part of the cause of her malnutrition. Started on insulin gtt.   - endocrinology consulted, appreciate assistance   - continue insulin drip   - continue lantus 9 units   - novolog CHO coverage: 1 unit per 15g CHO   - hold PTA insulin pump and CGMS while on insulin gtt  - continue PTA Creon      Acute on chronic pain  Patient reports severe pain in her abdomen and back. She reports that she has previously taken methadone for this, but no record of methadone later than 2010 could be located in her chart. CT A/P on admission unremarkable. Large amount of discrepancy regarding what her pain regimen was prior to admission, I.e. shared that she was taking methadone 20 mg 4x/day PRN for pain as well as oxycodone? National  for the past year does show prescriptions for methadone or oxycodone at all or consistently.   - pain team consulted, appreciate assistance (signed off 4/16)               - acetaminophen 975 mg PO q8h              - flexeril 5 mg PO TID PRN              - oxycodone 5 mg PO q4h PRN while inpatient while workup ongoing                           - please provide very short tapering script upon discharge               - consider gabapentin 100 mg TID               - lidocaine patches 1-3 patches               - menthol patches, 1 patch q8h               - bowel regimen   - pharmacy consulted for med rec, appreciate assistance     Hypotension - improving   Hypotensive with SBPs 70s-80s, asymptomatic. Received 500 ml bolus with improvement. Likely multifactorial including sedation from recent procedure, pain/sleep medication, and low rate of nutrition.   - continue to monitor      Malnutrition, severe  Cachexia   Patient reports > 30 lb weight loss in the last 6 months. Per endocrinology notes, this is likely multifactorial due to GI issues and uncontrolled diabetes, chronic abdominal pain. PEG-J exchanged on 4/17, tube feedings started.   - nutrition consulted, appreciate assistance   -  continue PTA tube feeds      Hx of adrenal insufficiency   Followed by Dr. aMher. Previously suppressed AM cortisol and has been on empiric therapy for possible adrenal insufficiency, unclear if central vs transient. Most recent clinic note describes inability to wean steroids due to hypoglycemia episodes.   - continue PTA hydrocortisone      Concern for malignancy  Elevated CEA   Patient was seen and evaluated by PCP with a stat CTCAP to assess for malignancy on 1/23. This scan was negative. She was scheduled for follow up with her PCP but has yet to follow up with her PCP. She notes elevated CEA since she was in [place] but has not had a workup showing any cancer at this point however tells me that she is working with her PCP on next steps.   - continue follow-up with PCP      Discharge planning:  - place referral to [facility] pain [facility]   - follow up with PCP regarding elevated CEA   - [facility] currently scheduled for 5/13/25 and 8/21/25  - PT/OT rec: home with assist; home with home OT/PT          Diet: Regular Diet Adult  Adult Formula Drip Feeding: Continuous [employer] Peptide 1.5; Jejunostomy; Goal Rate: 45; mL/hr; see relizorb cartridge order    DVT Prophylaxis: Pneumatic Compression Devices  Mckee Catheter: Not present  Lines: None     Cardiac Monitoring: None  Code Status: Full Code      Clinically Significant Risk Factors                               # Severe Malnutrition: based on nutrition assessment and treatment provided per dietitian's recommendations., PRESENT ON ADMISSION   # Financial/Environmental Concerns: none         Social Drivers of Health    Food Insecurity: High Risk (4/16/2025)    Food Insecurity     Within the past 12 months, did you worry that your food would run out before you got money to buy more?: Yes     Within the past 12 months, did the food you bought just not last and you didn t have money to get more?: Yes   Depression: At risk (1/23/2025)    PHQ-2     PHQ-2  Score: 6          Disposition Plan     Medically Ready for Discharge: Anticipated in 2-4 Days           The patient's care was discussed with the Attending Physician, Dr. Rosario, Bedside Nurse, and Patient.    Jahaira Ivey NP  Hospitalist Service  [facility]  Securely message with [employer] (more info)  Text page via [facility] Paging/Directory   ______________________________________________________________________    Interval History   Chantell was seen resting in bed, getting ready to eat breakfast. No acute events overnight. Still experiencing pretty significant soreness in LLQ and left flank. No change to pain level with eating/feeds or venting to tube. Denies urinary symptoms. Says she had a loose BM yesterday. Feels like she is tolerating tube feeds okay.     Physical Exam   Vital Signs: Temp: 97.5  F (36.4  C) Temp src: Oral BP: 100/71 Pulse: 80   Resp: 16 SpO2: 100 % O2 Device: None (Room air)    Weight: 109 lbs 9.1 oz    GENERAL: Alert and awake. Answering questions appropriately. Oriented x 3. NAD. Pleasant and conversational   HEENT: Anicteric sclera. EOMI. Mucous membranes moist   CARDIOVASCULAR: RRR. S1, S2. No murmurs, rubs, or gallops.   RESPIRATORY: Effort normal on RA. Clear to auscultation bilaterally, no rales, rhonchi or wheezes  GI: Abdomen soft, tender to palpation. PEG-J site intact   MUSCULOSKELETAL: Moves all extremities.   EXTREMITIES: No peripheral edema. No calf asymmetry, erythema, or tenderness.   NEUROLOGICAL: No focal deficits. Moving all extremities symmetrically.   SKIN: Intact. Warm and dry. No jaundice. No rashes on exposed skin    Medical Decision Making       60 MINUTES SPENT BY ME on the date of service doing chart review, history, exam, documentation & further activities per the note.      Data   Imaging results reviewed over the past 24 hrs:   Recent Results (from the past 24 hours)   XR Abdomen Port 1 View    Narrative    EXAM: XR  ABDOMEN PORT 1 VIEW  4/18/2025 2:26 PM     HISTORY:  Ensure correct placement of PEG-J       TECHNIQUE: Single frontal radiograph of the abdomen    COMPARISON:  CT abdomen 4/16/2025    FINDINGS:   AP portable supine radiograph of the abdomen. Percutaneous  gastrojejunostomy tube projects over the left hemiabdomen. Scattered  surgical clips. Few dilated loops of bowel in the abdomen without  obstructive pattern. No pneumatosis or portal venous gas.      Impression    IMPRESSION:   Percutaneous gastrojejunostomy tube projects over the left hemiabdomen  of uncertain location. If exact location is desired, consider repeat  abdominal radiograph after injecting small amount of contrast.    I have personally reviewed the examination and initial interpretation  and I agree with the findings.    MILLY BECERRA DO         SYSTEM ID:  [id number]   XR Abdomen Port 1 View    Impression    RESIDENT PRELIMINARY INTERPRETATION  IMPRESSION: Stable configuration of percutaneous gastrojejunostomy  tube with tip projecting over the left hemiabdomen.      Recent Labs   Lab 04/19/25  0653 04/19/25  0645 04/19/25  0521 04/19/25  0433 04/18/25  0254 04/18/25  0216 04/17/25  0636 04/17/25  0600 04/15/25  2354 04/15/25  2130   WBC  --  5.2  --   --   --  6.2  --   --   --  10.0   HGB  --  10.5*  --   --   --  11.3*  --   --   --  11.9   MCV  --  92  --   --   --  91  --   --   --  88   PLT  --  340  --   --   --  361  --   --   --  330   NA  --   --   --   --   --  136  --  138  --  123*   POTASSIUM  --   --   --   --   --  4.2  --  3.5  --  3.6   CHLORIDE  --   --   --   --   --  105  --  103  --  85*   CO2  --   --   --   --   --  23  --  27  --  23   BUN  --   --   --   --   --  6.6*  --  5.3*  --  6.1*   CR  --   --   --   --   --  0.42*  --  0.40*  --  0.35*   ANIONGAP  --   --   --   --   --  8  --  8  --  15   ELIZA  --   --   --   --   --  8.6*  --  8.5*  --  8.9   *  --  100* 97   < > 166*   < > 100*   < > 918*   ALBUMIN  --   --    --   --   --   --   --   --   --  3.9   PROTTOTAL  --   --   --   --   --   --   --   --   --  6.2*   BILITOTAL  --   --   --   --   --   --   --   --   --  0.3   ALKPHOS  --   --   --   --   --   --   --   --   --  186*   ALT  --   --   --   --   --   --   --   --   --  140*   AST  --   --   --   --   --   --   --   --   --  213*   LIPASE  --   --   --   --   --   --   --   --   --  7*    < > = values in this interval not displayed.

## 2025-04-19 NOTE — PROGRESS NOTES
2PM BG check late d/t PICC placement.    Juanita Carey RN on 4/19/2025 at 3:21 PM     Patient notified of lab results and recommendations  Patient scheduled for sooner appointment due to insurance change in the beginning of the year

## 2025-04-20 ENCOUNTER — APPOINTMENT (OUTPATIENT)
Dept: PHYSICAL THERAPY | Facility: CLINIC | Age: 60
End: 2025-04-20
Attending: INTERNAL MEDICINE
Payer: MEDICARE

## 2025-04-20 ENCOUNTER — APPOINTMENT (OUTPATIENT)
Dept: OCCUPATIONAL THERAPY | Facility: CLINIC | Age: 60
End: 2025-04-20
Payer: MEDICARE

## 2025-04-20 LAB
ANION GAP SERPL CALCULATED.3IONS-SCNC: 10 MMOL/L (ref 7–15)
BACTERIA UR CULT: NORMAL
BUN SERPL-MCNC: 18.6 MG/DL (ref 8–23)
CALCIUM SERPL-MCNC: 8.5 MG/DL (ref 8.8–10.4)
CHLORIDE SERPL-SCNC: 105 MMOL/L (ref 98–107)
CREAT SERPL-MCNC: 0.41 MG/DL (ref 0.51–0.95)
EGFRCR SERPLBLD CKD-EPI 2021: >90 ML/MIN/1.73M2
ERYTHROCYTE [DISTWIDTH] IN BLOOD BY AUTOMATED COUNT: 13.3 % (ref 10–15)
GLUCOSE BLDC GLUCOMTR-MCNC: 133 MG/DL (ref 70–99)
GLUCOSE BLDC GLUCOMTR-MCNC: 142 MG/DL (ref 70–99)
GLUCOSE BLDC GLUCOMTR-MCNC: 153 MG/DL (ref 70–99)
GLUCOSE BLDC GLUCOMTR-MCNC: 172 MG/DL (ref 70–99)
GLUCOSE BLDC GLUCOMTR-MCNC: 176 MG/DL (ref 70–99)
GLUCOSE BLDC GLUCOMTR-MCNC: 183 MG/DL (ref 70–99)
GLUCOSE BLDC GLUCOMTR-MCNC: 281 MG/DL (ref 70–99)
GLUCOSE BLDC GLUCOMTR-MCNC: 299 MG/DL (ref 70–99)
GLUCOSE SERPL-MCNC: 170 MG/DL (ref 70–99)
HCO3 SERPL-SCNC: 22 MMOL/L (ref 22–29)
HCT VFR BLD AUTO: 29.2 % (ref 35–47)
HGB BLD-MCNC: 9.9 G/DL (ref 11.7–15.7)
MAGNESIUM SERPL-MCNC: 1.9 MG/DL (ref 1.7–2.3)
MCH RBC QN AUTO: 31.2 PG (ref 26.5–33)
MCHC RBC AUTO-ENTMCNC: 33.9 G/DL (ref 31.5–36.5)
MCV RBC AUTO: 92 FL (ref 78–100)
PHOSPHATE SERPL-MCNC: 4.1 MG/DL (ref 2.5–4.5)
PLATELET # BLD AUTO: 355 10E3/UL (ref 150–450)
POTASSIUM SERPL-SCNC: 4.2 MMOL/L (ref 3.4–5.3)
RBC # BLD AUTO: 3.17 10E6/UL (ref 3.8–5.2)
SODIUM SERPL-SCNC: 137 MMOL/L (ref 135–145)
WBC # BLD AUTO: 5.4 10E3/UL (ref 4–11)

## 2025-04-20 PROCEDURE — 36415 COLL VENOUS BLD VENIPUNCTURE: CPT | Performed by: NURSE PRACTITIONER

## 2025-04-20 PROCEDURE — 250N000011 HC RX IP 250 OP 636

## 2025-04-20 PROCEDURE — 99232 SBSQ HOSP IP/OBS MODERATE 35: CPT

## 2025-04-20 PROCEDURE — 250N000013 HC RX MED GY IP 250 OP 250 PS 637: Performed by: INTERNAL MEDICINE

## 2025-04-20 PROCEDURE — 97129 THER IVNTJ 1ST 15 MIN: CPT | Mod: GO

## 2025-04-20 PROCEDURE — 97530 THERAPEUTIC ACTIVITIES: CPT | Mod: GO

## 2025-04-20 PROCEDURE — 250N000013 HC RX MED GY IP 250 OP 250 PS 637

## 2025-04-20 PROCEDURE — 250N000011 HC RX IP 250 OP 636: Performed by: INTERNAL MEDICINE

## 2025-04-20 PROCEDURE — 83735 ASSAY OF MAGNESIUM: CPT | Performed by: INTERNAL MEDICINE

## 2025-04-20 PROCEDURE — 99232 SBSQ HOSP IP/OBS MODERATE 35: CPT | Performed by: STUDENT IN AN ORGANIZED HEALTH CARE EDUCATION/TRAINING PROGRAM

## 2025-04-20 PROCEDURE — 97530 THERAPEUTIC ACTIVITIES: CPT | Mod: GP

## 2025-04-20 PROCEDURE — 250N000011 HC RX IP 250 OP 636: Mod: JZ | Performed by: STUDENT IN AN ORGANIZED HEALTH CARE EDUCATION/TRAINING PROGRAM

## 2025-04-20 PROCEDURE — 85027 COMPLETE CBC AUTOMATED: CPT | Performed by: NURSE PRACTITIONER

## 2025-04-20 PROCEDURE — 80048 BASIC METABOLIC PNL TOTAL CA: CPT | Performed by: NURSE PRACTITIONER

## 2025-04-20 PROCEDURE — 97535 SELF CARE MNGMENT TRAINING: CPT | Mod: GO

## 2025-04-20 PROCEDURE — 84100 ASSAY OF PHOSPHORUS: CPT | Performed by: INTERNAL MEDICINE

## 2025-04-20 PROCEDURE — 120N000002 HC R&B MED SURG/OB UMMC

## 2025-04-20 PROCEDURE — 97161 PT EVAL LOW COMPLEX 20 MIN: CPT | Mod: GP

## 2025-04-20 PROCEDURE — 250N000011 HC RX IP 250 OP 636: Mod: JZ | Performed by: NURSE PRACTITIONER

## 2025-04-20 PROCEDURE — 97110 THERAPEUTIC EXERCISES: CPT | Mod: GP

## 2025-04-20 RX ORDER — HYDROMORPHONE HCL IN WATER/PF 6 MG/30 ML
0.2 PATIENT CONTROLLED ANALGESIA SYRINGE INTRAVENOUS
Status: COMPLETED | OUTPATIENT
Start: 2025-04-20 | End: 2025-04-20

## 2025-04-20 RX ORDER — HYDROMORPHONE HCL IN WATER/PF 6 MG/30 ML
0.2 PATIENT CONTROLLED ANALGESIA SYRINGE INTRAVENOUS 3 TIMES DAILY PRN
Status: DISCONTINUED | OUTPATIENT
Start: 2025-04-20 | End: 2025-04-20

## 2025-04-20 RX ADMIN — OXYCODONE HYDROCHLORIDE 10 MG: 10 TABLET ORAL at 22:20

## 2025-04-20 RX ADMIN — OXYCODONE HYDROCHLORIDE 10 MG: 10 TABLET ORAL at 18:08

## 2025-04-20 RX ADMIN — PANTOPRAZOLE SODIUM 40 MG: 40 TABLET, DELAYED RELEASE ORAL at 08:28

## 2025-04-20 RX ADMIN — LINACLOTIDE 145 MCG: 145 CAPSULE, GELATIN COATED ORAL at 08:36

## 2025-04-20 RX ADMIN — SUCRALFATE 1 G: 1 TABLET ORAL at 22:19

## 2025-04-20 RX ADMIN — METOCLOPRAMIDE 10 MG: 5 TABLET ORAL at 08:30

## 2025-04-20 RX ADMIN — Medication 5 ML: at 15:57

## 2025-04-20 RX ADMIN — ONDANSETRON 4 MG: 4 TABLET, ORALLY DISINTEGRATING ORAL at 15:55

## 2025-04-20 RX ADMIN — PANCRELIPASE 7 CAPSULE: 60000; 12000; 38000 CAPSULE, DELAYED RELEASE PELLETS ORAL at 18:08

## 2025-04-20 RX ADMIN — METOCLOPRAMIDE 10 MG: 5 TABLET ORAL at 13:43

## 2025-04-20 RX ADMIN — ACETAMINOPHEN 975 MG: 325 TABLET, FILM COATED ORAL at 01:08

## 2025-04-20 RX ADMIN — INSULIN HUMAN 7 UNITS: 100 INJECTION, SUSPENSION SUBCUTANEOUS at 21:10

## 2025-04-20 RX ADMIN — TRAZODONE HYDROCHLORIDE 100 MG: 100 TABLET ORAL at 22:19

## 2025-04-20 RX ADMIN — OXYCODONE HYDROCHLORIDE 10 MG: 10 TABLET ORAL at 10:47

## 2025-04-20 RX ADMIN — GABAPENTIN 100 MG: 100 CAPSULE ORAL at 13:42

## 2025-04-20 RX ADMIN — DULOXETINE 90 MG: 60 CAPSULE, DELAYED RELEASE ORAL at 08:32

## 2025-04-20 RX ADMIN — OXYCODONE HYDROCHLORIDE 10 MG: 10 TABLET ORAL at 01:09

## 2025-04-20 RX ADMIN — PROCHLORPERAZINE MALEATE 10 MG: 5 TABLET ORAL at 18:08

## 2025-04-20 RX ADMIN — ACETAMINOPHEN 975 MG: 325 TABLET, FILM COATED ORAL at 08:27

## 2025-04-20 RX ADMIN — DICYCLOMINE HYDROCHLORIDE 10 MG: 10 CAPSULE ORAL at 06:00

## 2025-04-20 RX ADMIN — ACETAMINOPHEN 975 MG: 325 TABLET, FILM COATED ORAL at 15:55

## 2025-04-20 RX ADMIN — HYDROCORTISONE 15 MG: 10 TABLET ORAL at 22:19

## 2025-04-20 RX ADMIN — TOPIRAMATE 100 MG: 50 TABLET, FILM COATED ORAL at 20:04

## 2025-04-20 RX ADMIN — KETOROLAC TROMETHAMINE 15 MG: 15 INJECTION, SOLUTION INTRAMUSCULAR; INTRAVENOUS at 04:43

## 2025-04-20 RX ADMIN — CYCLOBENZAPRINE HYDROCHLORIDE 5 MG: 5 TABLET, FILM COATED ORAL at 08:51

## 2025-04-20 RX ADMIN — LEVOTHYROXINE SODIUM 125 MCG: 0.12 TABLET ORAL at 08:32

## 2025-04-20 RX ADMIN — OXYCODONE HYDROCHLORIDE 10 MG: 10 TABLET ORAL at 06:00

## 2025-04-20 RX ADMIN — POTASSIUM CHLORIDE 20 MEQ: 750 TABLET, EXTENDED RELEASE ORAL at 08:30

## 2025-04-20 RX ADMIN — DICYCLOMINE HYDROCHLORIDE 10 MG: 10 CAPSULE ORAL at 18:08

## 2025-04-20 RX ADMIN — HYDROCORTISONE 10 MG: 10 TABLET ORAL at 08:28

## 2025-04-20 RX ADMIN — SUCRALFATE 1 G: 1 TABLET ORAL at 15:55

## 2025-04-20 RX ADMIN — DICYCLOMINE HYDROCHLORIDE 10 MG: 10 CAPSULE ORAL at 13:10

## 2025-04-20 RX ADMIN — METOCLOPRAMIDE 10 MG: 5 TABLET ORAL at 20:04

## 2025-04-20 RX ADMIN — PANCRELIPASE 7 CAPSULE: 60000; 12000; 38000 CAPSULE, DELAYED RELEASE PELLETS ORAL at 12:19

## 2025-04-20 RX ADMIN — DICYCLOMINE HYDROCHLORIDE 10 MG: 10 CAPSULE ORAL at 01:09

## 2025-04-20 RX ADMIN — PANTOPRAZOLE SODIUM 40 MG: 40 TABLET, DELAYED RELEASE ORAL at 20:04

## 2025-04-20 RX ADMIN — GABAPENTIN 100 MG: 100 CAPSULE ORAL at 08:27

## 2025-04-20 RX ADMIN — GABAPENTIN 100 MG: 100 CAPSULE ORAL at 20:04

## 2025-04-20 RX ADMIN — PANCRELIPASE 7 CAPSULE: 60000; 12000; 38000 CAPSULE, DELAYED RELEASE PELLETS ORAL at 08:46

## 2025-04-20 RX ADMIN — HYDROMORPHONE HYDROCHLORIDE 0.2 MG: 0.2 INJECTION, SOLUTION INTRAMUSCULAR; INTRAVENOUS; SUBCUTANEOUS at 15:55

## 2025-04-20 RX ADMIN — Medication 15 ML: at 08:29

## 2025-04-20 RX ADMIN — POLYETHYLENE GLYCOL 3350 17 G: 17 POWDER, FOR SOLUTION ORAL at 08:31

## 2025-04-20 RX ADMIN — SUCRALFATE 1 G: 1 TABLET ORAL at 08:45

## 2025-04-20 RX ADMIN — FAMOTIDINE 20 MG: 20 TABLET, FILM COATED ORAL at 22:20

## 2025-04-20 RX ADMIN — ONDANSETRON 4 MG: 4 TABLET, ORALLY DISINTEGRATING ORAL at 22:19

## 2025-04-20 RX ADMIN — SUCRALFATE 1 G: 1 TABLET ORAL at 13:11

## 2025-04-20 RX ADMIN — THIAMINE HCL TAB 100 MG 100 MG: 100 TAB at 08:26

## 2025-04-20 RX ADMIN — TOPIRAMATE 100 MG: 50 TABLET, FILM COATED ORAL at 08:23

## 2025-04-20 ASSESSMENT — ACTIVITIES OF DAILY LIVING (ADL)
ADLS_ACUITY_SCORE: 60
ADLS_ACUITY_SCORE: 60
ADLS_ACUITY_SCORE: 61
ADLS_ACUITY_SCORE: 60
ADLS_ACUITY_SCORE: 61
ADLS_ACUITY_SCORE: 60

## 2025-04-20 NOTE — PLAN OF CARE
"/69 (BP Location: Right arm)   Pulse 88   Temp 98.4  F (36.9  C) (Oral)   Resp 16   Ht 1.575 m (5' 2\")   Wt 49.7 kg (109 lb 9.1 oz)   SpO2 96%   BMI 20.04 kg/m      Neuro: Alert and oriented x 4, lets needs known  Cardiac:Wnl, denies chest pain  Respiratory:WNL, Denies SOB  GI/: AUOP Voiding, No BM  Diet/Appetite:Tolerating diet, intermittent nausea, Tube feed to increase to 45mL/hr   Skin:no new skin deficient this shift  LDA: L PIV TKO , G/J Tube, Rt DL PICC, Lt PIV   Labs: Reviewed.  and 142, 1 unit given  Activity: SBA  Pain:pain contolled with oxy 10mg x 2 , Toradol  Plan:Cont with POC      Goal Outcome Evaluation:Ongoing                            "

## 2025-04-20 NOTE — PROGRESS NOTES
[facility]    Medicine Progress Note - Hospitalist Service, GOLD TEAM 7    Date of Admission:  4/15/2025    Assessment & Plan   Patient is a 59-year-old female admitted on 4/15/2025 with a significant past medical history of insulin-dependent diabetes mellitus after pancreatectomy, chronic pancreatitis, migraine, hypothyroidism, and G tube nutrition who presented to the ED with a displaced  tube and was admitted for IR replacement of the G tube.     Updates Today:  - still reporting bloating and abdominal pain mostly LLQ but somewhat generalized. Has been evaluated by GI for this who determined tube was not malpositioned or leaking or malfunctioning. Ucx with mixed urogenital bebeto. Abd XR reassuring but has large stool burden and only stooled once this admission. Will uptitrate bowel regimen today as suspect that is contributing to if not causing much of her acute component of abdominal pain on top of chronic pain.   - increase bowel reg  - follow updated endocrine recs   - follow updated GI recs    PEG tube displacement s/p exchange 4/17  Bloating  Patient reports that her G tube became dislodged approximately one day prior to admission with an audible pop. She notes distention of her abdomen with bloating and pain. IR was consulted for GJ replacement however their team is unable to replace GJ tube d/t need for general anesthesia and challenging exchange. PEG-J exchange with GI on 4/17.   - general surgery consulted, appreciate assistance   - GI luminal consulted, appreciate assistance   - vent g-tube to help with distention   - if continued abdominal pain/nausea, obtain AXR to ensure PEG-J in correct place   - G-tube for meds/venting, J-tube for feeds/flushes      Post-pancreatectomy diabetes (type 1)  TPAIT (2012)  She was previously seen outpatient by both endocrinology and PCP. Her A1c on 1/23 was extremely high at 17.1, repeat A1c 19.1% on 4/15/25. Her glucose  values have ranged in the 400s to 900s outpatient. She was seen by endocrinology, who report that it is unlikely she is taking her dose of insulin at this A1c and recommended exchange for new insulin. On admission, glucose was in the 500s, though patient had no signs of DKA. Per assessment of her endocrinologist, this is likely part of the cause of her malnutrition. Started on insulin gtt.   - endocrinology consulted, appreciate assistance   - continue insulin drip   - continue lantus 9 units   - novolog CHO coverage: 1 unit per 15g CHO   - hold PTA insulin pump and CGMS while on insulin gtt  - continue PTA Creon      Acute on chronic pain  Patient reports severe pain in her abdomen and back. She reports that she has previously taken methadone for this, but no record of methadone later than 2010 could be located in her chart. CT A/P on admission unremarkable. Large amount of discrepancy regarding what her pain regimen was prior to admission, I.e. shared that she was taking methadone 20 mg 4x/day PRN for pain as well as oxycodone? National  for the past year does show prescriptions for methadone or oxycodone at all or consistently.   - pain team consulted, appreciate assistance (signed off 4/16)               - acetaminophen 975 mg PO q8h              - flexeril 5 mg PO TID PRN              - oxycodone 5 mg PO q4h PRN while inpatient while workup ongoing                           - please provide very short tapering script upon discharge               - consider gabapentin 100 mg TID               - lidocaine patches 1-3 patches               - menthol patches, 1 patch q8h               - bowel regimen   - pharmacy consulted for med rec, appreciate assistance     Hypotension - improving   Hypotensive with SBPs 70s-80s, asymptomatic. Received 500 ml bolus with improvement. Likely multifactorial including sedation from recent procedure, pain/sleep medication, and low rate of nutrition.   - continue to monitor       Malnutrition, severe  Cachexia   Patient reports > 30 lb weight loss in the last 6 months. Per endocrinology notes, this is likely multifactorial due to GI issues and uncontrolled diabetes, chronic abdominal pain. PEG-J exchanged on 4/17, tube feedings started.   - nutrition consulted, appreciate assistance   - continue PTA tube feeds      Hx of adrenal insufficiency   Followed by Dr. Maher. Previously suppressed AM cortisol and has been on empiric therapy for possible adrenal insufficiency, unclear if central vs transient. Most recent clinic note describes inability to wean steroids due to hypoglycemia episodes.   - continue PTA hydrocortisone      Concern for malignancy  Elevated CEA   Patient was seen and evaluated by PCP with a stat CTCAP to assess for malignancy on 1/23. This scan was negative. She was scheduled for follow up with her PCP but has yet to follow up with her PCP. She notes elevated CEA since she was in [place] but has not had a workup showing any cancer at this point however tells me that she is working with her PCP on next steps.   - continue follow-up with PCP      Discharge planning:  - place referral to [facility] pain clinic   - follow up with PCP regarding elevated CEA   - [facility]ology currently scheduled for 5/13/25 and 8/21/25  - PT/OT rec: home with assist; home with home OT/PT          Diet: Regular Diet Adult  Adult Formula Drip Feeding: Continuous [employer] Peptide 1.5; Jejunostomy; Goal Rate: 45; mL/hr; see relizorb cartridge order    DVT Prophylaxis: Pneumatic Compression Devices  Mckee Catheter: Not present  Lines: PRESENT           Cardiac Monitoring: None  Code Status: Full Code      Clinically Significant Risk Factors                               # Severe Malnutrition: based on nutrition assessment and treatment provided per dietitian's recommendations.    # Financial/Environmental Concerns: none         Social Drivers of Health    Food Insecurity: High Risk  (4/16/2025)    Food Insecurity     Within the past 12 months, did you worry that your food would run out before you got money to buy more?: Yes     Within the past 12 months, did the food you bought just not last and you didn t have money to get more?: Yes   Depression: At risk (1/23/2025)    PHQ-2     PHQ-2 Score: 6          Disposition Plan     Medically Ready for Discharge: Anticipated Tomorrow or following day             Jose L Hurtado MD  Hospitalist Service, [facility] TEAM 7  [facility]  Securely message with [employer] (more info)  Text page via [facility] Paging/Directory   See signed in provider for up to date coverage information  ______________________________________________________________________    Interval History   Chantell was seen resting in bed, getting ready to eat breakfast. No acute events overnight. Still experiencing pretty significant soreness in LLQ and left flank. No change to pain level with eating/feeds or venting to tube. Denies urinary symptoms. Says she had a loose BM yesterday. Feels like she is tolerating tube feeds okay.     Physical Exam   Vital Signs: Temp: 98.3  F (36.8  C) Temp src: Oral BP: 98/67 Pulse: 102   Resp: 16 SpO2: (!) 89 % O2 Device: None (Room air)    Weight: 109 lbs 9.1 oz    GENERAL: Alert and awake. Answering questions appropriately. Oriented x 3. NAD. Pleasant and conversational   HEENT: Anicteric sclera. EOMI. Mucous membranes moist   CARDIOVASCULAR: RRR. S1, S2. No murmurs, rubs, or gallops.   RESPIRATORY: Effort normal on RA. Clear to auscultation bilaterally, no rales, rhonchi or wheezes  GI: Abdomen distended and tender to palpation diffusely. No guarding or rigidity. PEG-J site intact   MUSCULOSKELETAL: Moves all extremities.   EXTREMITIES: No peripheral edema. No calf asymmetry, erythema, or tenderness.   NEUROLOGICAL: No focal deficits. Moving all extremities symmetrically.   SKIN: Intact. Warm and dry. No jaundice. No  rashes on exposed skin    Medical Decision Making       60 MINUTES SPENT BY ME on the date of service doing chart review, history, exam, documentation & further activities per the note.      Data   Imaging results reviewed over the past 24 hrs:   No results found for this or any previous visit (from the past 24 hours).    Recent Labs   Lab 04/20/25  1301 04/20/25  0835 04/20/25  0710 04/19/25  0653 04/19/25  0645 04/18/25  0254 04/18/25  0216 04/15/25  2354 04/15/25  2130   WBC  --   --  5.4  --  5.2  --  6.2  --  10.0   HGB  --   --  9.9*  --  10.5*  --  11.3*  --  11.9   MCV  --   --  92  --  92  --  91  --  88   PLT  --   --  355  --  340  --  361  --  330   NA  --   --  137  --  136  --  136   < > 123*   POTASSIUM  --   --  4.2  --  3.8  --  4.2   < > 3.6   CHLORIDE  --   --  105  --  106  --  105   < > 85*   CO2  --   --  22  --  22  --  23   < > 23   BUN  --   --  18.6  --  11.3  --  6.6*   < > 6.1*   CR  --   --  0.41*  --  0.55  --  0.42*   < > 0.35*   ANIONGAP  --   --  10  --  8  --  8   < > 15   ELIZA  --   --  8.5*  --  8.4*  --  8.6*   < > 8.9   * 176* 170*   < > 124*   < > 166*   < > 918*   ALBUMIN  --   --   --   --   --   --   --   --  3.9   PROTTOTAL  --   --   --   --   --   --   --   --  6.2*   BILITOTAL  --   --   --   --   --   --   --   --  0.3   ALKPHOS  --   --   --   --   --   --   --   --  186*   ALT  --   --   --   --   --   --   --   --  140*   AST  --   --   --   --   --   --   --   --  213*   LIPASE  --   --   --   --   --   --   --   --  7*    < > = values in this interval not displayed.

## 2025-04-20 NOTE — PLAN OF CARE
Goal Outcome Evaluation:  Patient reports pain to mid lower abdomin, reports little relief with Oxycodone.MD aware.T-fdg 45ml/ hour.Nausea with vomiting post meal food intake.Noted leaking at G/J tube site.MD aware.Ambulated halls,steady,C/O weak.Continue to monitor

## 2025-04-20 NOTE — PROGRESS NOTES
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 Inpatient Diabetes Service consulted for glycemic management recommendations.          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 Poor control  (A1c 19.1 % 4/16/25, Hgb: 11.9)  Approaching HHS [glucose 918, calculated serum osmolality 299, ketones 1.51, vpH 7.43]    Plan/Recommendations:   - Lantus 4 units q 24 hrs at 1800  - NPH 7 units at 0900 to cover tube feeds (dosed for KeraFAST at 45 ml/hr)   -  NPH insulin to cover tube feeds at 2100  If TF rate at 20 ml per hour, give 3 units  If TF rate at 30 ml per hour, give 5 units  If TF rate from 40-45 ml/hr, give 7 units   -  Novolog Meal Coverage:  1 per 12 grams CHO, TID AC and PRN with snacks/supplements  - Start Novolog Correction Scale: 1 per 50 >140 q 4 hrs after IV insulin discontinued.   - PRN D10: Start if NPH given and tube feeds stopped/interrupted at 40 ml/hr for hypoglycemia prevention. OR start for low BG <80 at 10 ml/hr and titrate to keep -120   - BG Monitoring: Every 1-2 hours on insulin drip --> Then q 4 hrs on continuous tube feeds   - Hypoglycemia protocol  - Carb counting protocol     Discussion:   Transitioned off of IV insulin yesterday evening. Stable glycemic trends after stopping insulin drip. NPH of 7 units to cover tube feeds overnight with BGs in the 130s. Trending up during the day, but taking some PO. Will follow trends today to monitor for stability and adjust as  indicated. Tolerating tube feeds at goal rate of 45ml/hr, but still having a lot of pain.     Discussed preferred inpatient management of tube feeds with NPH as allows for more close titration, with understanding that patient was on BID Lantus to cover her tube feeds prior. Patient states she will be able to follow a new plan with information written out clearly for her. There was question at her outpatient visit with Dr. Maher and on admission if she was taking her insulin appropriately or if she was using  medications as she had returned from [place]. Patient tells me she had switched out her pens, but BG was >900 on admission with A1c of 19.1 and current daily insulin needs are quite low, raising suspicion that she was not taking insulin appropriately at home.     Message sent to Dr. Hurtado. Possible that patient will be medically ready for discharge as soon as tomorrow. Expressed concern over medication compliance given A1c of 19. Will order diabetes educator consult and see if plan can be reviewed with patient and  prior to discharge.     [employer] Peptide 1.5 provides:  10 mL/hr = 1.38 g/hr  20 mL/hr = 2.76 g/hr  30 mL/hr = 4.14 g/hr  40 mL/hr = 5.52 g/hr  45 mL/hr = 6.21 g/hr    Discharge Planning: (tentative)  Medications:  Lantus once daily, NPH with tube feeds, Novolog ICR and correction scale. ( Uses more intuitive dosing at home for carbs and correction, may benefit from review and ongoing outpatient follow up.   Test Claims:  ordered  for NPH, Lantus, CGM   Contacted discharge pharmacy regarding dexcom sensors. Pt needs to go through specialty pharmacy for dexcom fills as they are not covered under her Medicare Part D plan.   Education:  Ordered - review NPH and Lantus--previously on BID Lantus. Patient thinks she will be able to do NPH with everything printed out     Outpatient Follow-up:  recommend [facility]; next scheduled 25 with Libby Jay RN and  8/21/25 with Dr. Maher    Please notify Inpatient Diabetes Service if changes are planned to steroids, nutrition, TPN/TF and anticipated procedures requiring prolonged NPO status.         Interval History/Review of Systems :   The last 24 hours progress and nursing notes reviewed.  Endorses pain, feeling a little worse this morning. Had a large breakfast and thinks she over-did it.     Planned Procedures/Surgeries: PEGJ exchange 4/17 vs separate PEG and PEJ    Inpatient Glucose Control:       Recent Labs   Lab 04/20/25  0405 04/20/25  0044 04/19/25  2333 04/19/25  2228 04/19/25  2141 04/19/25  2056   * 133* 134* 141* 164* 181*             Medications Impacting Glycemia:   Steroids:  hydrocortisone 10 mg am, 15 mg HS (adrenal insufficiency)    D5W-containing solutions/medications: none   Other medications impacting glucose: none         Nutrition:   Orders Placed This Encounter      Regular Diet Adult    Supplements: none   TF: Goal TF: [employer] Peptide 1.5 (or equivalent) @ 45 mL/hr (1080 mL/day) to provide  149 g CHO daily   - 4/17: [employer] Peptide 1.5 @ 10mL/hr   4/18: [employer] Peptide 1.5 @ 20 - 40 mL/hr --> reduced back to 20 ml/hr at 2100  4/19: [employer] Peptide 1.5 @ 20 ml/hr--> advanced to 30 ml/hr at 0800 and will increase to 40 ml/hr this evening   4/20: [employer] Peptide 1.5 @ 45 ml/hr- goal provides 149 g CHO per day   TPN: none         Diabetes History: see full consult note for complete diabetes history   Diabetes Type and Duration: Pancreatogenic diabetes s/p total pancreatectomy and islet autotransplant with insulin dependence since 1/2012  GAD65 antibody, zinc transporter 8 antibody, islet antibody, insulin antibody not available on epic search.     Numerous C-peptides:  Component      Latest Ref Rng 8/28/2018  6:47 AM 9/3/2018  6:41 PM 9/6/2018  8:00 AM 9/7/2018  6:55 AM 4/18/2019  11:04 AM 4/3/2025  2:01 PM   C-Peptide      0.9 - 6.9 ng/mL 0.3 (L)  0.2 (L)  1.8  2.8  1.8  0.5 (L)   "  Patient Fasting?           Yes          PTA Medication Regimen:  started new pens  - Lantus 18 units AM, 12 units PM  - Novolog ICR 1:16  - Novolog correction 1:35 (more intuitive dosing)  Missing doses?: denies  Historical Diabetes Medications: MDI, insulin pumps     Glucose monitoring device/frequency/trends: Has not picked up dexcom yet--does not think it has been filled. Accucheck (checking 4-6 times daily) running 400 to \"high\" generally  Hypoglycemia PTA:   - Frequency: none  - Severity: + hx of hypoglycemic seizures  - Awareness: absent/decreased  - Treatment: candies in purse, glucose liquid shot, glucose tabs     Outpatient Diabetes Provider: [facility]: Dr. Maher (LOV 4/3/25)  Formal Diabetes Education/Educator: yes        Physical Exam:   /69 (BP Location: Right arm)   Pulse 88   Temp 98.4  F (36.9  C) (Oral)   Resp 16   Ht 1.575 m (5' 2\")   Wt 49.7 kg (109 lb 9.1 oz)   SpO2 96%   BMI 20.04 kg/m      General Appearance: cachectic, Alert and interactive   HEENT: Normocephalic, atraumatic.   Lungs:  Breathing comfortably  Abdomen: Round, distended. G/J tube in place   Extremities:  No significant lower extremity edema. Fluid movement of extremities.   Skin:  Warm and dry.   Psych:  Calm, appropriate mood and affect.           Data:     Lab Results   Component Value Date    A1C 19.1 (H) 04/15/2025    A1C 18.9 (H) 04/03/2025    A1C 17.1 (H) 01/23/2025    A1C 11.1 (H) 03/02/2023    A1C 13.3 (H) 09/26/2022    A1C 7.3 (H) 11/09/2020    A1C 6.7 (A) 11/21/2019    A1C 8.2 (H) 06/11/2019    A1C Canceled, Test credited 06/10/2019    A1C 9.6 (H) 04/18/2019       ROUTINE IP LABS (Last four results)  BMP  Recent Labs   Lab 04/20/25  0405 04/20/25  0044 04/19/25  2333 04/19/25  2228 04/19/25  0653 04/19/25  0645 04/18/25  0254 04/18/25  0216 04/17/25  0636 04/17/25  0600 04/15/25  2354 04/15/25  6420   NA  --   --   --   --   --  136  --  136  --  138  --  123*   POTASSIUM  --   --   --   --   " --  3.8  --  4.2  --  3.5  --  3.6   CHLORIDE  --   --   --   --   --  106  --  105  --  103  --  85*   ELIZA  --   --   --   --   --  8.4*  --  8.6*  --  8.5*  --  8.9   CO2  --   --   --   --   --  22  --  23  --  27  --  23   BUN  --   --   --   --   --  11.3  --  6.6*  --  5.3*  --  6.1*   CR  --   --   --   --   --  0.55  --  0.42*  --  0.40*  --  0.35*   * 133* 134* 141*   < > 124*   < > 166*   < > 100*   < > 918*    < > = values in this interval not displayed.     CBC  Recent Labs   Lab 04/19/25  0645 04/18/25  0216 04/15/25  2130   WBC 5.2 6.2 10.0   RBC 3.29* 3.60* 3.81   HGB 10.5* 11.3* 11.9   HCT 30.1* 32.8* 33.4*   MCV 92 91 88   MCH 31.9 31.4 31.2   MCHC 34.9 34.5 35.6   RDW 12.9 12.6 11.9    361 330     INRNo lab results found in last 7 days.    Inpatient Diabetes Service will continue to follow, please don't hesitate to contact the team with any questions or concerns.     BEE Yuen, CNP   Inpatient Diabetes Management Service   Pager: [phone]   Available on Vocera      Plan discussed with patient, bedside RN, and primary team via this note.    To contact Inpatient Diabetes Service:     7 AM - 5 PM: Page the IDS DAVID following the patient that day (see filed or incomplete progress notes/consult notes under Endocrinology)    OR if uncertain of provider assignment: page job code [phone]    5 PM - 7 AM: First call after hours is to primary service.    For urgent after-hours questions, page job code for on call fellow: [phone]     I spent a total of 40 minutes on the date of the encounter doing prep/post-work, chart review, history and exam, documentation and further activities per the note including lab review, multidisciplinary communication, counseling the patient and/or coordinating care regarding acute hyper/hypoglycemic management, as well as discharge management and planning/communication.

## 2025-04-20 NOTE — PROGRESS NOTES
04/20/25 1400   Appointment Info   Signing Clinician's Name / Credentials (PT) Marlyn Sahu, PT, DPT   Rehab Comments (PT) FWW order placed, protective abd precs   Living Environment   People in Home spouse   Current Living Arrangements house   Home Accessibility stairs to enter home;stairs within home   Number of Stairs, Main Entrance 2   Stair Railings, Main Entrance railings safe and in good condition   Number of Stairs, Within Home, Primary greater than 10 stairs  (18)   Stair Railings, Within Home, Primary railing on left side (ascending)   Transportation Anticipated family or friend will provide   Living Environment Comments Patient reports living with spouse in the house with 2 MANUEL, bilateral rails.  Reports needing to take up to 18 steps with left side rail to get up to the bedroom.   Self-Care   Usual Activity Tolerance fair   Current Activity Tolerance fair   Regular Exercise No   Equipment Currently Used at Home grab bar, tub/shower   Fall history within last six months yes   Number of times patient has fallen within last six months 5   Activity/Exercise/Self-Care Comment pt reports being primarily mod IND with mobility at baseline; reports she had been furniture surfing at home more recently and had 4-6 falls in the last 6 months.   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   Existing Precautions/Restrictions abdominal   Cognition    Affect/Mental Status (Cognition) WFL   Orientation Status (Cognition) oriented x 4   Follows Commands (Cognition) WFL   Pain Assessment   Patient Currently in Pain Yes, see Vital Sign flowsheet   Integumentary/Edema   Integumentary/Edema no deficits were identifed   Posture    Posture Forward head position;Protracted shoulders   Range of Motion (ROM)   Range of Motion ROM is WFL   Strength (Manual Muscle Testing)   Strength (Manual Muscle Testing) Deficits observed during functional mobility   Bed Mobility   Bed Mobility supine-sit   Comment, (Bed Mobility) SBA   Transfers   Transfers sit-stand transfer   Comment, (Transfers) CGA-SBA   Gait/Stairs (Locomotion)   [place] Level (Gait) contact guard   Assistive Device (Gait) walker, standard   Comment, (Gait/Stairs) CGA with IV pole support   Balance   Balance no deficits were identified   Sensory Examination   Sensory Perception other (describe)   Sensory Perception Comments pt reports tingling and feeling of hardness in her feet   Coordination   Coordination no deficits were identified   Muscle Tone   Muscle Tone no deficits were identified   Clinical Impression   Criteria for Skilled Therapeutic Intervention Yes, treatment indicated   PT Diagnosis (PT) impaired functional mobility   Influenced by the following impairments weakness, pain, deconditioning   Functional limitations due to impairments bed mobility, transfers, gait, stairs   Clinical Presentation (PT Evaluation Complexity) stable   Clinical Presentation Rationale clinician judgement   Clinical Decision Making (Complexity) low complexity   Planned Therapy Interventions (PT) balance training;gait training;home exercise program;postural re-education;patient/family education;stretching;stair training;strengthening;transfer training;progressive activity/exercise;risk factor education;home program guidelines   Risk & Benefits of therapy have been explained evaluation/treatment results reviewed;care  plan/treatment goals reviewed;risks/benefits reviewed;current/potential barriers reviewed;participants voiced agreement with care plan;participants included;patient   PT Total Evaluation Time   PT Sakina Low Complexity Minutes (51811) 7   Physical Therapy Goals   PT Frequency 5x/week   PT Predicted Duration/Target Date for Goal Attainment 05/15/25   PT Goals Bed Mobility;Transfers;Gait;Stairs   PT: Bed Mobility Independent;Supine to/from sit   PT: Transfers Independent;Sit to/from stand   PT: Gait Independent;Modified independent;Assistive device;Standard walker;Greater than 200 feet   PT: Stairs Modified independent;Greater than 10 stairs;Rail on left;Rail on right   Interventions   Interventions Quick Adds Therapeutic Activity;Therapeutic Procedure   PT Discharge Planning   PT Plan PT: work on flat bed mobility, repeat sit to stands, initiate and progress gait with walker   PT Discharge Recommendation (DC Rec) home with assist;home with home care physical therapy   PT Rationale for DC Rec pt currently below her baseline level in terms of functional mobility and independence. pt may require adult walker issued by discharge pending progress (order placed). Recommending d/c home with assist from spouse as needed and  PT to promote strength and independence with mobility   PT Brief overview of current status CGA with walker; encourage walks with RN staff 3-4x/day   PT Total Distance Amb During Session (feet) 0   Physical Therapy Time and Intention   Total Session Time (sum of timed and untimed services) 7

## 2025-04-20 NOTE — PROGRESS NOTES
Admitted/transferred from: Observation 1A  2 RN skin assessment completed by Teo Harkins, RN and Angel GONZALEZ.  Skin assessment finding: Peg tube to gravity. Rt DL PICC  Interventions/actions: Monitor peg tube for leaking

## 2025-04-20 NOTE — PROGRESS NOTES
"Blood pressure 119/83, pulse 93, temperature 98.6  F (37  C), temperature source Oral, resp. rate 16, height 1.575 m (5' 2\"), weight 49.7 kg (109 lb 9.1 oz), SpO2 98%, not currently breastfeeding.    Activity: SBA  Neuros:  AOX4, makes needs known  Cardiac: Tachy, denies chest pain  Respiratory: WDL, RA,   GI/: AUOP, no BM  Diet: Regular, great appetite ate 156 cho  Skin/Incisions/Drains: Peg tube to gravity, Rt DL PICC, Lt PIV  Lines: Rt PICC, Lt PIV TKO w/insulin drip 0.5-2.5u/hr  Pain: 8-10 Oxy, Toradol, compazine  Plan: Manage pain and blood sugar. At 2230, tube feeding was increase to goal rate of 45cc/hr and , insulin drip to 0.5u/hr now    "

## 2025-04-21 ENCOUNTER — APPOINTMENT (OUTPATIENT)
Dept: PHYSICAL THERAPY | Facility: CLINIC | Age: 60
DRG: 393 | End: 2025-04-21
Payer: MEDICARE

## 2025-04-21 ENCOUNTER — PATIENT OUTREACH (OUTPATIENT)
Dept: CARE COORDINATION | Facility: CLINIC | Age: 60
End: 2025-04-21
Payer: MEDICARE

## 2025-04-21 LAB
ANION GAP SERPL CALCULATED.3IONS-SCNC: 11 MMOL/L (ref 7–15)
BUN SERPL-MCNC: 19.7 MG/DL (ref 8–23)
CALCIUM SERPL-MCNC: 8.6 MG/DL (ref 8.8–10.4)
CHLORIDE SERPL-SCNC: 104 MMOL/L (ref 98–107)
CREAT SERPL-MCNC: 0.43 MG/DL (ref 0.51–0.95)
EGFRCR SERPLBLD CKD-EPI 2021: >90 ML/MIN/1.73M2
ERYTHROCYTE [DISTWIDTH] IN BLOOD BY AUTOMATED COUNT: 13.6 % (ref 10–15)
GLUCOSE BLDC GLUCOMTR-MCNC: 125 MG/DL (ref 70–99)
GLUCOSE BLDC GLUCOMTR-MCNC: 126 MG/DL (ref 70–99)
GLUCOSE BLDC GLUCOMTR-MCNC: 176 MG/DL (ref 70–99)
GLUCOSE BLDC GLUCOMTR-MCNC: 186 MG/DL (ref 70–99)
GLUCOSE BLDC GLUCOMTR-MCNC: 201 MG/DL (ref 70–99)
GLUCOSE BLDC GLUCOMTR-MCNC: 225 MG/DL (ref 70–99)
GLUCOSE BLDC GLUCOMTR-MCNC: 282 MG/DL (ref 70–99)
GLUCOSE BLDC GLUCOMTR-MCNC: 82 MG/DL (ref 70–99)
GLUCOSE SERPL-MCNC: 162 MG/DL (ref 70–99)
HCO3 SERPL-SCNC: 24 MMOL/L (ref 22–29)
HCT VFR BLD AUTO: 31.8 % (ref 35–47)
HGB BLD-MCNC: 10.4 G/DL (ref 11.7–15.7)
MAGNESIUM SERPL-MCNC: 1.9 MG/DL (ref 1.7–2.3)
MCH RBC QN AUTO: 31.3 PG (ref 26.5–33)
MCHC RBC AUTO-ENTMCNC: 32.7 G/DL (ref 31.5–36.5)
MCV RBC AUTO: 96 FL (ref 78–100)
PHOSPHATE SERPL-MCNC: 4.1 MG/DL (ref 2.5–4.5)
PLATELET # BLD AUTO: 410 10E3/UL (ref 150–450)
POTASSIUM SERPL-SCNC: 4.4 MMOL/L (ref 3.4–5.3)
RBC # BLD AUTO: 3.32 10E6/UL (ref 3.8–5.2)
SODIUM SERPL-SCNC: 139 MMOL/L (ref 135–145)
WBC # BLD AUTO: 7.6 10E3/UL (ref 4–11)

## 2025-04-21 PROCEDURE — 250N000013 HC RX MED GY IP 250 OP 250 PS 637: Performed by: INTERNAL MEDICINE

## 2025-04-21 PROCEDURE — 80048 BASIC METABOLIC PNL TOTAL CA: CPT | Performed by: NURSE PRACTITIONER

## 2025-04-21 PROCEDURE — 99232 SBSQ HOSP IP/OBS MODERATE 35: CPT | Performed by: STUDENT IN AN ORGANIZED HEALTH CARE EDUCATION/TRAINING PROGRAM

## 2025-04-21 PROCEDURE — 85018 HEMOGLOBIN: CPT | Performed by: NURSE PRACTITIONER

## 2025-04-21 PROCEDURE — 120N000002 HC R&B MED SURG/OB UMMC

## 2025-04-21 PROCEDURE — 99233 SBSQ HOSP IP/OBS HIGH 50: CPT | Performed by: CLINICAL NURSE SPECIALIST

## 2025-04-21 PROCEDURE — 84100 ASSAY OF PHOSPHORUS: CPT | Performed by: INTERNAL MEDICINE

## 2025-04-21 PROCEDURE — 83735 ASSAY OF MAGNESIUM: CPT | Performed by: INTERNAL MEDICINE

## 2025-04-21 PROCEDURE — 250N000011 HC RX IP 250 OP 636: Performed by: INTERNAL MEDICINE

## 2025-04-21 PROCEDURE — 250N000011 HC RX IP 250 OP 636

## 2025-04-21 PROCEDURE — 97530 THERAPEUTIC ACTIVITIES: CPT | Mod: GP | Performed by: PHYSICAL THERAPIST

## 2025-04-21 PROCEDURE — 250N000013 HC RX MED GY IP 250 OP 250 PS 637

## 2025-04-21 PROCEDURE — 250N000011 HC RX IP 250 OP 636: Mod: JZ | Performed by: NURSE PRACTITIONER

## 2025-04-21 PROCEDURE — 36592 COLLECT BLOOD FROM PICC: CPT | Performed by: NURSE PRACTITIONER

## 2025-04-21 RX ORDER — SENNOSIDES 8.6 MG
2 TABLET ORAL DAILY
Status: DISCONTINUED | OUTPATIENT
Start: 2025-04-21 | End: 2025-04-24 | Stop reason: HOSPADM

## 2025-04-21 RX ORDER — BISACODYL 10 MG
10 SUPPOSITORY, RECTAL RECTAL EVERY 24 HOURS
Status: DISCONTINUED | OUTPATIENT
Start: 2025-04-21 | End: 2025-04-24 | Stop reason: HOSPADM

## 2025-04-21 RX ORDER — AMOXICILLIN 250 MG
2 CAPSULE ORAL
Status: DISCONTINUED | OUTPATIENT
Start: 2025-04-21 | End: 2025-04-24 | Stop reason: HOSPADM

## 2025-04-21 RX ORDER — AMOXICILLIN 250 MG
1 CAPSULE ORAL
Status: DISCONTINUED | OUTPATIENT
Start: 2025-04-21 | End: 2025-04-24 | Stop reason: HOSPADM

## 2025-04-21 RX ORDER — POLYETHYLENE GLYCOL 3350 17 G/17G
17 POWDER, FOR SOLUTION ORAL 3 TIMES DAILY
Status: DISCONTINUED | OUTPATIENT
Start: 2025-04-21 | End: 2025-04-24 | Stop reason: HOSPADM

## 2025-04-21 RX ADMIN — GABAPENTIN 100 MG: 100 CAPSULE ORAL at 14:27

## 2025-04-21 RX ADMIN — TOPIRAMATE 100 MG: 50 TABLET, FILM COATED ORAL at 08:15

## 2025-04-21 RX ADMIN — KETOROLAC TROMETHAMINE 15 MG: 15 INJECTION, SOLUTION INTRAMUSCULAR; INTRAVENOUS at 20:31

## 2025-04-21 RX ADMIN — OXYCODONE HYDROCHLORIDE 10 MG: 10 TABLET ORAL at 15:19

## 2025-04-21 RX ADMIN — DICYCLOMINE HYDROCHLORIDE 10 MG: 10 CAPSULE ORAL at 00:11

## 2025-04-21 RX ADMIN — PROCHLORPERAZINE MALEATE 10 MG: 5 TABLET ORAL at 23:23

## 2025-04-21 RX ADMIN — LINACLOTIDE 145 MCG: 145 CAPSULE, GELATIN COATED ORAL at 08:17

## 2025-04-21 RX ADMIN — OXYCODONE HYDROCHLORIDE 10 MG: 10 TABLET ORAL at 23:43

## 2025-04-21 RX ADMIN — ACETAMINOPHEN 975 MG: 325 TABLET, FILM COATED ORAL at 23:21

## 2025-04-21 RX ADMIN — ACETAMINOPHEN 975 MG: 325 TABLET, FILM COATED ORAL at 08:14

## 2025-04-21 RX ADMIN — SUCRALFATE 1 G: 1 TABLET ORAL at 08:14

## 2025-04-21 RX ADMIN — ACETAMINOPHEN 975 MG: 325 TABLET, FILM COATED ORAL at 00:10

## 2025-04-21 RX ADMIN — METOCLOPRAMIDE 10 MG: 5 TABLET ORAL at 08:15

## 2025-04-21 RX ADMIN — DICYCLOMINE HYDROCHLORIDE 10 MG: 10 CAPSULE ORAL at 12:52

## 2025-04-21 RX ADMIN — TOPIRAMATE 100 MG: 50 TABLET, FILM COATED ORAL at 20:30

## 2025-04-21 RX ADMIN — SUCRALFATE 1 G: 1 TABLET ORAL at 22:56

## 2025-04-21 RX ADMIN — Medication 5 ML: at 23:42

## 2025-04-21 RX ADMIN — OXYCODONE HYDROCHLORIDE 10 MG: 10 TABLET ORAL at 06:22

## 2025-04-21 RX ADMIN — SUCRALFATE 1 G: 1 TABLET ORAL at 12:52

## 2025-04-21 RX ADMIN — INSULIN HUMAN 7 UNITS: 100 INJECTION, SUSPENSION SUBCUTANEOUS at 20:31

## 2025-04-21 RX ADMIN — DICYCLOMINE HYDROCHLORIDE 10 MG: 10 CAPSULE ORAL at 23:23

## 2025-04-21 RX ADMIN — POLYETHYLENE GLYCOL 3350 17 G: 17 POWDER, FOR SOLUTION ORAL at 08:16

## 2025-04-21 RX ADMIN — METOCLOPRAMIDE 10 MG: 5 TABLET ORAL at 14:27

## 2025-04-21 RX ADMIN — PANCRELIPASE 7 CAPSULE: 60000; 12000; 38000 CAPSULE, DELAYED RELEASE PELLETS ORAL at 14:26

## 2025-04-21 RX ADMIN — Medication 15 ML: at 08:16

## 2025-04-21 RX ADMIN — LEVOTHYROXINE SODIUM 125 MCG: 0.12 TABLET ORAL at 08:15

## 2025-04-21 RX ADMIN — FAMOTIDINE 20 MG: 20 TABLET, FILM COATED ORAL at 22:56

## 2025-04-21 RX ADMIN — METOCLOPRAMIDE 10 MG: 5 TABLET ORAL at 20:30

## 2025-04-21 RX ADMIN — TRAZODONE HYDROCHLORIDE 100 MG: 100 TABLET ORAL at 22:56

## 2025-04-21 RX ADMIN — POTASSIUM CHLORIDE 20 MEQ: 750 TABLET, EXTENDED RELEASE ORAL at 08:15

## 2025-04-21 RX ADMIN — DICYCLOMINE HYDROCHLORIDE 10 MG: 10 CAPSULE ORAL at 18:40

## 2025-04-21 RX ADMIN — Medication 5 ML: at 15:32

## 2025-04-21 RX ADMIN — HYDROCORTISONE 15 MG: 10 TABLET ORAL at 22:56

## 2025-04-21 RX ADMIN — PANTOPRAZOLE SODIUM 40 MG: 40 TABLET, DELAYED RELEASE ORAL at 20:30

## 2025-04-21 RX ADMIN — PANCRELIPASE 7 CAPSULE: 60000; 12000; 38000 CAPSULE, DELAYED RELEASE PELLETS ORAL at 18:40

## 2025-04-21 RX ADMIN — OXYCODONE HYDROCHLORIDE 10 MG: 10 TABLET ORAL at 10:34

## 2025-04-21 RX ADMIN — GABAPENTIN 100 MG: 100 CAPSULE ORAL at 08:15

## 2025-04-21 RX ADMIN — PANCRELIPASE 7 CAPSULE: 60000; 12000; 38000 CAPSULE, DELAYED RELEASE PELLETS ORAL at 08:18

## 2025-04-21 RX ADMIN — ACETAMINOPHEN 975 MG: 325 TABLET, FILM COATED ORAL at 15:18

## 2025-04-21 RX ADMIN — GABAPENTIN 100 MG: 100 CAPSULE ORAL at 20:40

## 2025-04-21 RX ADMIN — CYCLOBENZAPRINE HYDROCHLORIDE 5 MG: 5 TABLET, FILM COATED ORAL at 10:35

## 2025-04-21 RX ADMIN — PANTOPRAZOLE SODIUM 40 MG: 40 TABLET, DELAYED RELEASE ORAL at 08:15

## 2025-04-21 RX ADMIN — DULOXETINE 90 MG: 60 CAPSULE, DELAYED RELEASE ORAL at 08:14

## 2025-04-21 RX ADMIN — THIAMINE HCL TAB 100 MG 100 MG: 100 TAB at 08:14

## 2025-04-21 RX ADMIN — SUCRALFATE 1 G: 1 TABLET ORAL at 18:40

## 2025-04-21 RX ADMIN — HYDROCORTISONE 10 MG: 10 TABLET ORAL at 08:15

## 2025-04-21 RX ADMIN — CYCLOBENZAPRINE HYDROCHLORIDE 5 MG: 5 TABLET, FILM COATED ORAL at 20:30

## 2025-04-21 RX ADMIN — ONDANSETRON 4 MG: 4 TABLET, ORALLY DISINTEGRATING ORAL at 20:30

## 2025-04-21 RX ADMIN — DICYCLOMINE HYDROCHLORIDE 10 MG: 10 CAPSULE ORAL at 06:19

## 2025-04-21 ASSESSMENT — ACTIVITIES OF DAILY LIVING (ADL)
ADLS_ACUITY_SCORE: 62
ADLS_ACUITY_SCORE: 60
ADLS_ACUITY_SCORE: 62
ADLS_ACUITY_SCORE: 60
ADLS_ACUITY_SCORE: 62
ADLS_ACUITY_SCORE: 60
ADLS_ACUITY_SCORE: 62
ADLS_ACUITY_SCORE: 60
ADLS_ACUITY_SCORE: 62
ADLS_ACUITY_SCORE: 60
ADLS_ACUITY_SCORE: 60
ADLS_ACUITY_SCORE: 62
ADLS_ACUITY_SCORE: 60
ADLS_ACUITY_SCORE: 62
ADLS_ACUITY_SCORE: 60
ADLS_ACUITY_SCORE: 60

## 2025-04-21 NOTE — PROGRESS NOTES
Home Infusion  Received referral from  [facility] for Enteral feedings.  Benefits verified.  Patient has Medicare and is covered 100%, as long as LAURE is documented by provider.  Met with pt at bedside to review home infusion services, review benefits and offer choice of providers.  Patient would like to use [facility] for home infusion.  Confirmed discharge address, phone, and emergency contact information.  Chantell is expected to discharge as soon as 04/22/25 and will be going home on enteral feeds via pump.  She has never done home enteral therapy before however she will receive teaching by enteral nurse liaison tomorrow around 1100.  Provided her with information about [facility] services.  Explained about administration method via the Infinity pump with back pack for mobility. Discussed options of hook up of tube feedings at the hospital prior to discharge vs disconnect for transport home (if feasible per medical team) and restart of feeding at home.   Informed them about supplies and delivery of supplies, storage of formula, plan for  24/7 availability of [facility] staff while on enteral therapy.    Pt verbalized understanding of all information given.  She are willing and able to learn and manage home enteral therapy.  Patient expressed a desire to have the tube feedings hooked up at the hospital or off for transport if okay by provider.   Questions answered.  Will continue to follow and revisit pt once discharge plans are confirmed.    Thank you for the referral      Savi Marks LPN  Enteral Nurse Liaison  [facility]  [address] [place] [address]  [phone]  Main number [phone]

## 2025-04-21 NOTE — PROGRESS NOTES
Care Management Follow Up    Length of Stay (days): 5    Expected Discharge Date: 04/22/2025     Concerns to be Addressed:       Patient plan of care discussed at interdisciplinary rounds: Yes    Anticipated Discharge Disposition:  Home with services  Anticipated Discharge Services: Home Infusion, Home Care    [facility](TF)  Phone: [phone]  Fax: [phone]    [facility](RN/PT/OT)  Phone: [phone]  Fax: [phone]     Anticipated Discharge DME: Walker(PT will issue), Wheelchair    [facility](W/C)  4/21: Order placed on parachute     Patient/family educated on Medicare website which has current facility and service quality ratings:  yes  Education Provided on the Discharge Plan:  yes  Patient/Family in Agreement with the Plan:  yes    Referrals Placed by CM/SW: DME, Home Care, Home Infusion   Private pay costs discussed: Not applicable    Discussed  Partnership in Safe Discharge Planning  document with patient/family: No     Handoff Completed: Yes, [facility] PCP: Internal handoff referral completed    Additional Information:  Received update from Dr. Hurtado that patient is anticipated to be medically ready for discharge to home tomorrow.  PT is recommending home care PT/OT and due to her orthostatic hypotension and dizziness they are recommending a wheelchair.  Order for w/c placed to [facility] on parachute.  Referral sent to [facility] for home care services, [facility] accepted for RN/PT/OT services.  [facility] will continue to follow and assist with discharge planning.    Next Steps:   [] Update [facility] and [facility] on day of discharge, ensure orders are placed  [] Follow up on w/c order  [] IMM needed  [x] Internal hand off Ron Morgan, RN[facility]  Phone: [phone]   Med Surg Vocera  Nurse Coordinator

## 2025-04-21 NOTE — PROGRESS NOTES
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 Inpatient Diabetes Service consulted for glycemic management recommendations.          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 Poor control  (A1c 19.1 % 4/16/25, Hgb: 11.9)  Approaching HHS [glucose 918, calculated serum osmolality 299, ketones 1.51, vpH 7.43]    Plan/Recommendations:   - Lantus 4 units q 24 hrs at 1800  - NPH 7 units at 0900 to cover tube feeds (dosed for JouleX at 45 ml/hr)   -  NPH insulin to cover tube feeds at 2100  If TF rate at 20 ml per hour, give 3 units  If TF rate at 30 ml per hour, give 5 units  If TF rate from 40-45 ml/hr, give 7 units   -  Novolog Meal Coverage:  1 per 12 grams CHO, TID AC and PRN with snacks/supplements  - Novolog Correction Scale: 1 per 50 >140 qAC and x2 overnight during TF  - PRN D10: Start if NPH given and tube feeds stopped/interrupted at 40 ml/hr for hypoglycemia prevention. OR start for low BG <80 at 10 ml/hr and titrate to keep -120   - BG Monitoring:  q AC, HS and overnight during TFs  - Hypoglycemia protocol  - Carb counting protocol     Discussion:   One significant glucose spike-- may have following pt trying a newly delivered Ensure supp.  Making plans for discharge insulin regimen but pt note she feels poorly and expects to remain hospitalized.  Her main diabetes concern is that she gets Dexcom G7 sensor prescription sent.        Discharge Planning: **4/21  Medications:     Lantus 4 units every 24 hrs at 6pm  Novolog 1 unit per 12 grams carbohydrate  Novolog correction scale-- time for before meals when eating and otherwise, every 4-6 hours when receiving the tube feed  For  - 189 give 1 unit.   For  - 239 give 2 units.   For  - 289 give 3 units.   For  - 339 give 4 units.   For  - 389 give 5 units.   For  - 439 give 6 units.   For BG greater than or equal to 440 give 7 units.     Novolin N (NPH) 7 units at 9AM and 9PM to cover tube feeds (dosed for Getix at 45 ml/hr)   (If your rate of tube feed were to decrease, you can reference the list below )  If TF rate at 20 ml per hour, give 3 units  If TF rate at 30 ml per hour, give 5 units  If TF rate from 40-45 ml/hr, give 7 units    Humulin N needs to be gently rilled/mixed before injecting.  Do not give the dose if the tube feeds will not run.    **Restart CGM or check finger stick before meals and every 4-6 hours during eveining/overnight when getting tube feeds.      Test Claims:    CGM 0.00 must meet Medicare Part B's CGM criteria   lantus solostar pens too soon to fill until 4/26   novolin N flexpens 12.15     novolog flepxens 12.15 brand required by plan     Contacted discharge pharmacy regarding dexcom sensors. Pt needs to go through specialty pharmacy for dexcom fills as they are not covered under her Medicare Part D plan.   Education:  Ordered 4/20- review NPH and Lantus--previously on BID Lantus. Patient thinks she will be able to do NPH with everything printed out   (written plan provided 4/21, and pasted into AVS)    Outpatient Follow-up:  recommend [facility]; next scheduled 5/13/25 with Libby Jay RN (appt notes indicate pump restart under consideration )and 8/21/25 with Dr. Maher    Please notify Inpatient Diabetes Service if changes are planned to steroids, nutrition, TPN/TF and anticipated procedures requiring prolonged NPO status.         Interval  History/Review of Systems :   The last 24 hours progress and nursing notes reviewed.  Feels no better.  Same as last few days.  Dizzy when up.  Was orthostatic during therapy session.  Denies issues with TF.  Likes the Ensure supp that starting arriving.  Feels most concerned about her Dexcom G7-- uses a  and her  follows her on his phone krista      Planned Procedures/Surgeries: PEGJ exchange 4/17 vs separate PEG and PEJ    Inpatient Glucose Control:       Recent Labs   Lab 04/21/25  0801 04/21/25  0655 04/21/25  0331 04/21/25  0013 04/20/25  2224 04/20/25  2105   * 162* 82 126* 153* 172*             Medications Impacting Glycemia:   Steroids:  hydrocortisone 10 mg am, 15 mg HS (adrenal insufficiency)    D5W-containing solutions/medications: none   Other medications impacting glucose: none         Nutrition:   Orders Placed This Encounter      Regular Diet Adult    Supplements: none   TF: Goal TF: [employer] Peptide 1.5 (or equivalent) @ 45 mL/hr (1080 mL/day) to provide  149 g CHO daily   - 4/17: [employer] Peptide 1.5 @ 10mL/hr   4/18: [employer] Peptide 1.5 @ 20 - 40 mL/hr --> reduced back to 20 ml/hr at 2100  4/19: [employer] Peptide 1.5 @ 20 ml/hr--> advanced to 30 ml/hr at 0800 and will increase to 40 ml/hr this evening   4/20: [employer] Peptide 1.5 @ 45 ml/hr- goal provides 149 g CHO per day   TPN: none     [employer] Peptide 1.5 provides:  10 mL/hr = 1.38 g/hr  20 mL/hr = 2.76 g/hr  30 mL/hr = 4.14 g/hr  40 mL/hr = 5.52 g/hr  45 mL/hr = 6.21 g/hr        Diabetes History: see full consult note for complete diabetes history   Diabetes Type and Duration: Pancreatogenic diabetes s/p total pancreatectomy and islet autotransplant with insulin dependence since 1/2012  GAD65 antibody, zinc transporter 8 antibody, islet antibody, insulin antibody not available on epic search.     Numerous C-peptides:  Component      Latest Ref Rng 8/28/2018  6:47 AM 9/3/2018  6:41 PM 9/6/2018  8:00 AM  "9/7/2018  6:55 AM 4/18/2019  11:04 AM 4/3/2025  2:01 PM   C-Peptide      0.9 - 6.9 ng/mL 0.3 (L)  0.2 (L)  1.8  2.8  1.8  0.5 (L)    Patient Fasting?           Yes          PTA Medication Regimen:  started new pens  - Lantus 18 units AM, 12 units PM  - Novolog ICR 1:16  - Novolog correction 1:35 (more intuitive dosing)  Missing doses?: denies  Historical Diabetes Medications: MDI, insulin pumps     Glucose monitoring device/frequency/trends: Has not picked up dexcom yet--does not think it has been filled. Accucheck (checking 4-6 times daily) running 400 to \"high\" generally  Hypoglycemia PTA:   - Frequency: none  - Severity: + hx of hypoglycemic seizures  - Awareness: absent/decreased  - Treatment: candies in purse, glucose liquid shot, glucose tabs     Outpatient Diabetes Provider: [facility]: Dr. Maher (LOV 4/3/25)  Formal Diabetes Education/Educator: yes        Physical Exam:   /68 (BP Location: Left arm)   Pulse 97   Temp 98.3  F (36.8  C) (Oral)   Resp 16   Ht 1.575 m (5' 2\")   Wt 50.4 kg (111 lb 1.6 oz)   SpO2 99%   BMI 20.32 kg/m      General Appearance: cachectic, Alert and interactive   HEENT: Normocephalic, atraumatic.   Lungs:  Breathing comfortably  Abdomen:   Extremities:    Skin:  Warm and dry.   Psych:  Calm, appropriate mood and affect.           Data:     Lab Results   Component Value Date    A1C 19.1 (H) 04/15/2025    A1C 18.9 (H) 04/03/2025    A1C 17.1 (H) 01/23/2025    A1C 11.1 (H) 03/02/2023    A1C 13.3 (H) 09/26/2022    A1C 7.3 (H) 11/09/2020    A1C 6.7 (A) 11/21/2019    A1C 8.2 (H) 06/11/2019    A1C Canceled, Test credited 06/10/2019    A1C 9.6 (H) 04/18/2019       ROUTINE IP LABS (Last four results)  BMP  Recent Labs   Lab 04/21/25  0801 04/21/25  0655 04/21/25  0331 04/21/25  0013 04/20/25  0835 04/20/25  0710 04/19/25  0653 04/19/25  0645 04/18/25  0254 04/18/25  0216   NA  --  139  --   --   --  137  --  136  --  136   POTASSIUM  --  4.4  --   --   --  4.2  --  3.8  " --  4.2   CHLORIDE  --  104  --   --   --  105  --  106  --  105   ELIZA  --  8.6*  --   --   --  8.5*  --  8.4*  --  8.6*   CO2  --  24  --   --   --  22  --  22  --  23   BUN  --  19.7  --   --   --  18.6  --  11.3  --  6.6*   CR  --  0.43*  --   --   --  0.41*  --  0.55  --  0.42*   * 162* 82 126*   < > 170*   < > 124*   < > 166*    < > = values in this interval not displayed.     CBC  Recent Labs   Lab 04/21/25  0655 04/20/25  0710 04/19/25  0645 04/18/25  0216   WBC 7.6 5.4 5.2 6.2   RBC 3.32* 3.17* 3.29* 3.60*   HGB 10.4* 9.9* 10.5* 11.3*   HCT 31.8* 29.2* 30.1* 32.8*   MCV 96 92 92 91   MCH 31.3 31.2 31.9 31.4   MCHC 32.7 33.9 34.9 34.5   RDW 13.6 13.3 12.9 12.6    355 340 361     INRNo lab results found in last 7 days.    Inpatient Diabetes Service will continue to follow, please don't hesitate to contact the team with any questions or concerns.     BEE Yen CNS      Plan discussed with patient, bedside RN, and primary team via this note.    To contact Inpatient Diabetes Service:     7 AM - 5 PM: Page the IDS DAVID following the patient that day (see filed or incomplete progress notes/consult notes under Endocrinology)    OR if uncertain of provider assignment: page job code [phone]    5 PM - 7 AM: First call after hours is to primary service.    For urgent after-hours questions, page job code for on call fellow: [phone]     I spent a total of 52 minutes on the date of the encounter doing prep/post-work, chart review, history and exam, documentation and further activities per the note including lab review, multidisciplinary communication, counseling the patient and/or coordinating care regarding acute hyper/hypoglycemic management, as well as discharge management and planning/communication.

## 2025-04-21 NOTE — CONSULTS
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     Met with patient at bedside for education review.  Reviewed and provided a copy of patient's Tentative Diabetes Management Discharge Plan outlined below. Patient reports that she has a meter at home and knows how and when to use it to check her blood sugars. Discussed each medication on her Glucose Control Regimen, including when and how to take it. Per patient, she has taken Lantus and Novolog correction and carb coverage previously.. Her lunchtime blood sugar was 186 and she was able to correctly identify the dose of Novolog correction insulin and determine how much Novolog to give for carb coverage for her meal. NPH is new to the patient and education was provided on reconstituting it when to take it, and how to determine the dose based on tube feeding rate. Per patient, she experiences hypoglycemia unawareness but her  recognizes when she is low. She uses a Gvoke HypoPen for lows. Provided a copy of Understanding Diabetes and a resource on how to use an insulin pen and how to give insulin pen injections.     Inpatient Diabetes Service was consulted for glycemic management.  Below plan is from BEE Kim, ANDREZS-BC, Inpatient Diabetes Service.    Tentative Diabetes Management Discharge Plan    Patient will need the following supplies prescribed:   Lantus Solostar pens (too soon to fill)  Novolog Flexpens  Novoling NPH flexpens  \"BD\" (32G x 4mm) insulin pen needles  Sharps container  Alcohol swabs     Blood glucose monitoring: Restart CGM or check finger stick before meals and every 4-6 hours during eveining/overnight when getting tube feeds.  "     Glucose Control Regimen:  1) Long-acting insulin: glargine (Lantus) - take 4 units once daily at 6pm. Take at same time each day.     2) Rapid-acting insulin: aspart (Novolog) carbohydrate coverage/mealtime insulin - take 1 unit per 12 grams of carbohydrates before meals and snacks.    3) Rapid-acting insulin: aspart (Novolog) correction - see chart below. Take for high blood glucoses three times daily before meals when eating (or every 4-6 hours when receiving tube feeding) and at bedtime.  You may add the correction dose to the carbohydrate coverage/mealtime insulin dose and give in one injection--ideally 10-15 minutes before a meal.  You may take the correction dose even if you skip a meal (as long as it has been 4 hours since previous correction dose).     Blood Glucose Insulin Before Meals When Eating or every 4-6 hours when receiving tube feeding:   Less than 140 0 units   140 -189  1 units   190 - 239 2 units   240 - 289 3 units   290 - 339 4 units    340 - 389  5 units   390 - 439 6 units   440 or more 7 units       4) Intermediate-acting insulin: Novolin N (NPH)    Novolin N (NPH) 7 units at 9AM and 9PM to cover tube feeds (dosed for [employer] at 45 ml/hr)   (If your rate of tube feed were to decrease, you can reference the list below )  If TF rate at 20 ml per hour, give 3 units  If TF rate at 30 ml per hour, give 5 units  If TF rate from 40-45 ml/hr, give 7 units     Humulin N needs to be gently rilled/mixed before injecting.  Do not give the dose if the tube feeds will not run.    Hypoglycemia (Low Blood Glucose) Management:  Signs/symptoms:  Shaking, sweating, fast heartbeat  Feeling dizzy, tired, or weak   Feeling anxious and easy to irritate  Feeling nervous, crabby, or confused  Hunger  Vision problems, headache  Numb or tingling mouth    If blood glucose is 51 to 69:   Eat or drink 15 grams of carbohydrate. Examples:   1/2 cup (4 ounces) apple juice or regular soda pop, or   1 cup (8 ounces)  milk, or   15 skittles, or   3 to 4 glucose tablets.   Re-check your blood glucose in 15 minutes.   Repeat these steps every 15 minutes until your blood glucose is above 80.       If blood glucose is 50 or less:   Eat or drink 30 grams of carbohydrate. Examples:   1 cup (8 ounces) apple juice or regular soda pop, or   2 cups (16 ounces) milk, or   1 banana, or   6 to 8 glucose tablets.   Re-check your blood glucose in 15 minutes.   Repeat these steps every 15 minutes until your blood glucose is above 80.     If you have urgent questions or concerns regarding your blood sugars or insulin, you may contact [phone] (the main hospital ). Ask to speak with the Endocrinologist on call.    RN and IDS informed of review and patient's knowledgable and safe to provide diabetes cares.    BEE Stevenson  Diabetes CNS/TUYET Darby

## 2025-04-21 NOTE — PROGRESS NOTES
[facility]    Medicine Progress Note - Hospitalist Service, GOLD TEAM 7    Date of Admission:  4/15/2025    Assessment & Plan   Patient is a 59-year-old female admitted on 4/15/2025 with a significant past medical history of insulin-dependent diabetes mellitus after pancreatectomy, chronic pancreatitis, migraine, hypothyroidism, and G tube nutrition who presented to the ED with a displaced  tube and was admitted for IR replacement of the G tube.     Updates Today:  - Tube feeds at goal, still taking PO meals but vomits everything and food she eats does not amount to an appreciable amount of caloric intake  - Diabetes educator working with patient and  to ensure compliance at home as very high A1c on admission seems inconsistent with med compliance  - Chantell is weaker than her prior baseline, will need wheelchair for home at this time per PT    PEG tube displacement s/p exchange 4/17  Bloating  Patient reports that her G tube became dislodged approximately one day prior to admission with an audible pop. She notes distention of her abdomen with bloating and pain. IR was consulted for GJ replacement however their team is unable to replace GJ tube d/t need for general anesthesia and challenging exchange. PEG-J exchange with GI on 4/17.   - general surgery consulted, appreciate assistance   - GI luminal consulted, appreciate assistance   - vent g-tube to help with distention   - if continued abdominal pain/nausea, obtain AXR to ensure PEG-J in correct place   - G-tube for meds/venting, J-tube for feeds/flushes      Post-pancreatectomy diabetes (type 1)  TPAIT (2012)  She was previously seen outpatient by both endocrinology and PCP. Her A1c on 1/23 was extremely high at 17.1, repeat A1c 19.1% on 4/15/25. Her glucose values have ranged in the 400s to 900s outpatient. She was seen by endocrinology, who report that it is unlikely she is taking her dose of insulin at this A1c  and recommended exchange for new insulin. On admission, glucose was in the 500s, though patient had no signs of DKA. Per assessment of her endocrinologist, this is likely part of the cause of her malnutrition. Started on insulin gtt.   - endocrinology consulted, appreciate assistance   - continue insulin drip   - continue lantus 9 units   - novolog CHO coverage: 1 unit per 15g CHO   - hold PTA insulin pump and CGMS while on insulin gtt  - continue PTA Creon      Acute on chronic pain  Patient reports severe pain in her abdomen and back. She reports that she has previously taken methadone for this, but no record of methadone later than 2010 could be located in her chart. CT A/P on admission unremarkable. Large amount of discrepancy regarding what her pain regimen was prior to admission, I.e. shared that she was taking methadone 20 mg 4x/day PRN for pain as well as oxycodone? National  for the past year does show prescriptions for methadone or oxycodone at all or consistently.   - pain team consulted, appreciate assistance (signed off 4/16)               - acetaminophen 975 mg PO q8h              - flexeril 5 mg PO TID PRN              - oxycodone 5 mg PO q4h PRN while inpatient while workup ongoing                           - please provide very short tapering script upon discharge               - consider gabapentin 100 mg TID               - lidocaine patches 1-3 patches               - menthol patches, 1 patch q8h               - bowel regimen   - pharmacy consulted for med rec, appreciate assistance     Hypotension - improving   Hypotensive with SBPs 70s-80s, asymptomatic. Received 500 ml bolus with improvement. Likely multifactorial including sedation from recent procedure, pain/sleep medication, and low rate of nutrition.   - continue to monitor      Malnutrition, severe  Cachexia   Patient reports > 30 lb weight loss in the last 6 months. Per endocrinology notes, this is likely multifactorial due to GI  issues and uncontrolled diabetes, chronic abdominal pain. PEG-J exchanged on 4/17, tube feedings started.   - nutrition consulted, appreciate assistance   - continue PTA tube feeds      Hx of adrenal insufficiency   Followed by Dr. Maher. Previously suppressed AM cortisol and has been on empiric therapy for possible adrenal insufficiency, unclear if central vs transient. Most recent clinic note describes inability to wean steroids due to hypoglycemia episodes.   - continue PTA hydrocortisone      Concern for malignancy  Elevated CEA   Patient was seen and evaluated by PCP with a stat CTCAP to assess for malignancy on 1/23. This scan was negative. She was scheduled for follow up with her PCP but has yet to follow up with her PCP. She notes elevated CEA since she was in [place] but has not had a workup showing any cancer at this point however tells me that she is working with her PCP on next steps.   - continue follow-up with PCP      Discharge planning:  - place referral to [facility]   - follow up with PCP regarding elevated CEA   - [facility] End[facility]ology currently scheduled for 5/13/25 and 8/21/25  - PT/OT rec: home with assist; home with home OT/PT          Diet: Regular Diet Adult  Adult Formula Drip Feeding: Continuous [employer] Peptide 1.5; Jejunostomy; Goal Rate: 45; mL/hr; see relizorb cartridge order    DVT Prophylaxis: Pneumatic Compression Devices  Mckee Catheter: Not present  Lines: PRESENT      PICC 04/19/25 Double Lumen Right Brachial vein medial Access. PICC okay to use.-Site Assessment: WDL    Cardiac Monitoring: None  Code Status: Full Code      Clinically Significant Risk Factors                               # Severe Malnutrition: based on nutrition assessment and treatment provided per dietitian's recommendations.    # Financial/Environmental Concerns: none         Social Drivers of Health    Food Insecurity: High Risk (4/16/2025)    Food Insecurity     Within the past 12 months, did  you worry that your food would run out before you got money to buy more?: Yes     Within the past 12 months, did the food you bought just not last and you didn t have money to get more?: Yes   Depression: At risk (1/23/2025)    PHQ-2     PHQ-2 Score: 6          Disposition Plan     Medically Ready for Discharge: Anticipated Tomorrow              Jose L Hurtado MD  Hospitalist Service, [facility] TEAM 7  [facility]  Securely message with [employer] (more info)  Text page via [facility] Paging/Directory   See signed in provider for up to date coverage information  ______________________________________________________________________    Interval History   Chantell was seen resting in bed, getting ready to eat breakfast. No acute events overnight. Still experiencing pretty significant soreness in LLQ and left flank. No change to pain level with eating/feeds or venting to tube. Denies urinary symptoms. Says she had a loose BM yesterday. Feels like she is tolerating tube feeds okay.     Physical Exam   Vital Signs: Temp: 98.7  F (37.1  C) Temp src: Oral BP: 128/77 Pulse: 99   Resp: 16 SpO2: 100 % O2 Device: None (Room air)    Weight: 111 lbs 1.6 oz    GENERAL: Alert and awake. Answering questions appropriately. Oriented x 3. NAD. Pleasant and conversational   HEENT: Anicteric sclera. EOMI. Mucous membranes moist   CARDIOVASCULAR: RRR. S1, S2. No murmurs, rubs, or gallops.   RESPIRATORY: Effort normal on RA. Clear to auscultation bilaterally, no rales, rhonchi or wheezes  GI: Abdomen distended and tender to palpation diffusely. No guarding or rigidity. PEG-J site intact   MUSCULOSKELETAL: Moves all extremities.   EXTREMITIES: No peripheral edema. No calf asymmetry, erythema, or tenderness.   NEUROLOGICAL: No focal deficits. Moving all extremities symmetrically.   SKIN: Intact. Warm and dry. No jaundice. No rashes on exposed skin    Medical Decision Making       60 MINUTES SPENT BY ME on the  date of service doing chart review, history, exam, documentation & further activities per the note.      Data   Imaging results reviewed over the past 24 hrs:   No results found for this or any previous visit (from the past 24 hours).    Recent Labs   Lab 04/21/25 2029 04/21/25  1811 04/21/25  1416 04/21/25  0801 04/21/25  0655 04/20/25  0835 04/20/25  0710 04/19/25  0653 04/19/25  0645 04/15/25  2354 04/15/25  2130   WBC  --   --   --   --  7.6  --  5.4  --  5.2   < > 10.0   HGB  --   --   --   --  10.4*  --  9.9*  --  10.5*   < > 11.9   MCV  --   --   --   --  96  --  92  --  92   < > 88   PLT  --   --   --   --  410  --  355  --  340   < > 330   NA  --   --   --   --  139  --  137  --  136   < > 123*   POTASSIUM  --   --   --   --  4.4  --  4.2  --  3.8   < > 3.6   CHLORIDE  --   --   --   --  104  --  105  --  106   < > 85*   CO2  --   --   --   --  24  --  22  --  22   < > 23   BUN  --   --   --   --  19.7  --  18.6  --  11.3   < > 6.1*   CR  --   --   --   --  0.43*  --  0.41*  --  0.55   < > 0.35*   ANIONGAP  --   --   --   --  11  --  10  --  8   < > 15   ELIZA  --   --   --   --  8.6*  --  8.5*  --  8.4*   < > 8.9   * 176* 186*   < > 162*   < > 170*   < > 124*   < > 918*   ALBUMIN  --   --   --   --   --   --   --   --   --   --  3.9   PROTTOTAL  --   --   --   --   --   --   --   --   --   --  6.2*   BILITOTAL  --   --   --   --   --   --   --   --   --   --  0.3   ALKPHOS  --   --   --   --   --   --   --   --   --   --  186*   ALT  --   --   --   --   --   --   --   --   --   --  140*   AST  --   --   --   --   --   --   --   --   --   --  213*   LIPASE  --   --   --   --   --   --   --   --   --   --  7*    < > = values in this interval not displayed.

## 2025-04-21 NOTE — PROGRESS NOTES
"Blood pressure 123/79, pulse 103, temperature 98.3  F (36.8  C), temperature source Oral, resp. rate 16, height 1.575 m (5' 2\"), weight 49.7 kg (109 lb 9.1 oz), SpO2 96%, not currently breastfeeding.    Activity: SBA  Neuros:  AOX4, makes needs known  Cardiac: Tachy, denies chest pain  Respiratory: WDL, RA  GI/: AUOP, no BM  Diet: Regular, TF 45cc/hr continuous  Skin/Incisions/Drains: Peg, Rt PICC  Lines: Rt PICC hep lock purple lumen  Pain: Oxy, Dilaudid, Zofran, Compazine  Plan: Monitor blood glucose and pain    "

## 2025-04-21 NOTE — PROGRESS NOTES
Clinic Care Coordination Contact    Situation: Patient chart reviewed by care coordinator.    Background: Referred by Dr. Merlos for Complex Medical Concerns/Education related to Chronic Diagnosis (type 1 diabetes) on 4/17/25.     We placed a coordination care referral and notified our clinic RN Jose for close follow up and hopefully will be able to facilitate immediate clinic visit post admission, although we have not been able to reach out to her in the past few months despite our best efforts. Pt remains at high risk of readmission due to non compliance and lack of follow up.     Assessment: Per chart review, pt remains inpatient.     Plan/Recommendations: RN will follow patient at hospital discharge.    MARCY LOVING RN on 4/21/2025 at 11:08 AM

## 2025-04-21 NOTE — DISCHARGE INSTRUCTIONS
"Patient will need the following supplies prescribed:   Lantus Solostar pens (too soon to fill)  Novolog Flexpens  Novoling NPH flexpens  \"BD\" (32G x 4mm) insulin pen needles  Sharps container  Alcohol swabs      Blood glucose monitoring: Restart CGM or check finger stick before meals and every 4-6 hours during eveining/overnight when getting tube feeds.       Glucose Control Regimen:  1) Long-acting insulin: glargine (Lantus) - take 3 units once daily at 6pm. Take at same time each day.     2) Rapid-acting insulin: aspart (Novolog) carbohydrate coverage/mealtime insulin - take 1 unit per 14 grams of carbohydrates before meals and snacks.     3) Rapid-acting insulin: aspart (Novolog) correction - see chart below. Take for high blood glucoses three times daily before meals when eating (or every 4-6 hours when receiving tube feeding) and at bedtime.  You may add the correction dose to the carbohydrate coverage/mealtime insulin dose and give in one injection--ideally 10-15 minutes before a meal.  You may take the correction dose even if you skip a meal (as long as it has been 4 hours since previous correction dose).      Blood Glucose Insulin Before Meals When Eating or every 4-6 hours when receiving tube feeding:   Less than 175 0 units   175 -224  1 units   225 - 274 2 units   275 - 324 3 units   325 - 374 4 units    375 - 424  5 units   425 - 474 6 units   475 or more 7 units         4) Intermediate-acting insulin: Novolin N (NPH). Take to cover tube feeds (dosed for [employer] at 45 ml/hr)   - Take Novolin N (NPH) 4 units at 9AM   - Take Novolin N (NPH) 3 units at 9PM      (If your rate of tube feed were to decrease, you can reference the list below)  If TF rate at 20 ml per hour, give 1 units  If TF rate at 30 ml per hour, give 3 units  If TF rate from 40-45 ml/hr, give 4 units     Humulin N needs to be gently rilled/mixed before injecting.  Do not give the dose if the tube feeds will not run.    **Restart CGM or " check finger stick before meals and every 4-6 hours during eveining/overnight when getting tube feeds.    Test Claims:    CGM 0.00 must meet Medicare Part B's CGM criteria   lantus solostar pens too soon to fill until 4/26   novolin N flexpens 12.15     novolog flepxens 12.15 brand required by plan     Contacted discharge pharmacy regarding dexcom sensors. Pt needs to go through specialty pharmacy for dexcom fills as they are not covered under her Medicare Part D plan.     [facility]; next scheduled 5/13/25 with Libby Jay RN and 8/21/25 with Dr. Maher     Tube Feeding Formula: [employer] Peptide 1.5 @ 45 ml/hr continuous  Relizorb (1 cartridge changed q 8 hours)   Water: Recommend at least 900 ml fluids daily (oral vs tube feeding flushes).

## 2025-04-21 NOTE — PLAN OF CARE
Goal Outcome Evaluation:      Plan of Care Reviewed With: patient    Overall Patient Progress: no changeOverall Patient Progress: no change    Outcome Evaluation: continue with POC    Status: admitted with displaced G tube, G tube exchanged 4/17  Vitals: Orthostatic hypotension (81/55) with PT- MD aware, other VSS on RA  Neuros: A&Ox4, makes needs known   IV: R DL PICC HL  Labs/Electrolytes: ACHS BG with carb coverage   Resp: denies SOB   Diet: regular, good PO intake. Continuous @TF 45ml/hr, Q4 60ml flushes  : voids spontaneously, hat put in toilet and pt educated on importance of I/Os  GI: BM today per pt, pt reports having regular. G/J tube site with leaking, team aware   Skin: leaking around G/J site, edema to LE  Pain: severe abdominal pain, little relief with oxycodone   Activity: SBA, worked with PT/OT. Dizzy when standing, MD aware  Plan: potential discharge tomorrow, continue with POC

## 2025-04-21 NOTE — PLAN OF CARE
"/79 (BP Location: Left arm)   Pulse 103   Temp 98.3  F (36.8  C) (Oral)   Resp 16   Ht 1.575 m (5' 2\")   Wt 49.7 kg (109 lb 9.1 oz)   SpO2 96%   BMI 20.04 kg/m      Neuro: Alert and oriented x 4, lets needs known  Cardiac:Wnl, denies chest pain  Respiratory:WNL, Denies SOB  GI/: AUOP Voiding, No BM  Diet/Appetite:Tolerating diet, intermittent nausea, Tube feed @ 45mL/hr   Skin:no new skin deficient this shift  LDA: L PIV TKO , G/J Tube, Rt DL PICC, Lt PIV   Labs: Reviewed.  and 87,   Activity: SBA  Pain:pain contolled with oxy 10mg x 2   Plan:Cont with POC      Goal Outcome Evaluation:ongoing                        "

## 2025-04-22 ENCOUNTER — APPOINTMENT (OUTPATIENT)
Dept: PHYSICAL THERAPY | Facility: CLINIC | Age: 60
DRG: 393 | End: 2025-04-22
Payer: MEDICARE

## 2025-04-22 ENCOUNTER — APPOINTMENT (OUTPATIENT)
Dept: GENERAL RADIOLOGY | Facility: CLINIC | Age: 60
End: 2025-04-22
Attending: PEDIATRICS
Payer: MEDICARE

## 2025-04-22 LAB
ANION GAP SERPL CALCULATED.3IONS-SCNC: 9 MMOL/L (ref 7–15)
BUN SERPL-MCNC: 20.1 MG/DL (ref 8–23)
CALCIUM SERPL-MCNC: 8.8 MG/DL (ref 8.8–10.4)
CANDIDA AURIS CONFIRMATION PCR: NORMAL
CHLORIDE SERPL-SCNC: 103 MMOL/L (ref 98–107)
CREAT SERPL-MCNC: 0.44 MG/DL (ref 0.51–0.95)
EGFRCR SERPLBLD CKD-EPI 2021: >90 ML/MIN/1.73M2
ERYTHROCYTE [DISTWIDTH] IN BLOOD BY AUTOMATED COUNT: 13.7 % (ref 10–15)
GLUCOSE BLDC GLUCOMTR-MCNC: 118 MG/DL (ref 70–99)
GLUCOSE BLDC GLUCOMTR-MCNC: 124 MG/DL (ref 70–99)
GLUCOSE BLDC GLUCOMTR-MCNC: 134 MG/DL (ref 70–99)
GLUCOSE BLDC GLUCOMTR-MCNC: 144 MG/DL (ref 70–99)
GLUCOSE BLDC GLUCOMTR-MCNC: 147 MG/DL (ref 70–99)
GLUCOSE BLDC GLUCOMTR-MCNC: 200 MG/DL (ref 70–99)
GLUCOSE BLDC GLUCOMTR-MCNC: 201 MG/DL (ref 70–99)
GLUCOSE BLDC GLUCOMTR-MCNC: 233 MG/DL (ref 70–99)
GLUCOSE BLDC GLUCOMTR-MCNC: 45 MG/DL (ref 70–99)
GLUCOSE BLDC GLUCOMTR-MCNC: 55 MG/DL (ref 70–99)
GLUCOSE BLDC GLUCOMTR-MCNC: 56 MG/DL (ref 70–99)
GLUCOSE BLDC GLUCOMTR-MCNC: 58 MG/DL (ref 70–99)
GLUCOSE SERPL-MCNC: 61 MG/DL (ref 70–99)
HCO3 SERPL-SCNC: 27 MMOL/L (ref 22–29)
HCT VFR BLD AUTO: 30.9 % (ref 35–47)
HGB BLD-MCNC: 10.2 G/DL (ref 11.7–15.7)
MAGNESIUM SERPL-MCNC: 2 MG/DL (ref 1.7–2.3)
MCH RBC QN AUTO: 31.3 PG (ref 26.5–33)
MCHC RBC AUTO-ENTMCNC: 33 G/DL (ref 31.5–36.5)
MCV RBC AUTO: 95 FL (ref 78–100)
PHOSPHATE SERPL-MCNC: 4.1 MG/DL (ref 2.5–4.5)
PLATELET # BLD AUTO: 404 10E3/UL (ref 150–450)
POTASSIUM SERPL-SCNC: 4.1 MMOL/L (ref 3.4–5.3)
RBC # BLD AUTO: 3.26 10E6/UL (ref 3.8–5.2)
SODIUM SERPL-SCNC: 139 MMOL/L (ref 135–145)
WBC # BLD AUTO: 6.9 10E3/UL (ref 4–11)

## 2025-04-22 PROCEDURE — 250N000011 HC RX IP 250 OP 636

## 2025-04-22 PROCEDURE — 97116 GAIT TRAINING THERAPY: CPT | Mod: GP | Performed by: PHYSICAL THERAPIST

## 2025-04-22 PROCEDURE — 250N000013 HC RX MED GY IP 250 OP 250 PS 637: Performed by: INTERNAL MEDICINE

## 2025-04-22 PROCEDURE — 250N000013 HC RX MED GY IP 250 OP 250 PS 637

## 2025-04-22 PROCEDURE — 250N000013 HC RX MED GY IP 250 OP 250 PS 637: Performed by: STUDENT IN AN ORGANIZED HEALTH CARE EDUCATION/TRAINING PROGRAM

## 2025-04-22 PROCEDURE — 258N000001 HC RX 258: Performed by: INTERNAL MEDICINE

## 2025-04-22 PROCEDURE — 74018 RADEX ABDOMEN 1 VIEW: CPT | Mod: 26 | Performed by: RADIOLOGY

## 2025-04-22 PROCEDURE — 80048 BASIC METABOLIC PNL TOTAL CA: CPT | Performed by: NURSE PRACTITIONER

## 2025-04-22 PROCEDURE — 83735 ASSAY OF MAGNESIUM: CPT | Performed by: INTERNAL MEDICINE

## 2025-04-22 PROCEDURE — 99233 SBSQ HOSP IP/OBS HIGH 50: CPT

## 2025-04-22 PROCEDURE — 36592 COLLECT BLOOD FROM PICC: CPT | Performed by: NURSE PRACTITIONER

## 2025-04-22 PROCEDURE — 97530 THERAPEUTIC ACTIVITIES: CPT | Mod: GP | Performed by: PHYSICAL THERAPIST

## 2025-04-22 PROCEDURE — 999N000065 XR ABDOMEN PORT 1 VIEW

## 2025-04-22 PROCEDURE — 250N000011 HC RX IP 250 OP 636: Performed by: INTERNAL MEDICINE

## 2025-04-22 PROCEDURE — 84100 ASSAY OF PHOSPHORUS: CPT | Performed by: INTERNAL MEDICINE

## 2025-04-22 PROCEDURE — 99232 SBSQ HOSP IP/OBS MODERATE 35: CPT | Performed by: PEDIATRICS

## 2025-04-22 PROCEDURE — 85018 HEMOGLOBIN: CPT | Performed by: NURSE PRACTITIONER

## 2025-04-22 PROCEDURE — 120N000002 HC R&B MED SURG/OB UMMC

## 2025-04-22 RX ORDER — ACYCLOVIR 400 MG/1
TABLET ORAL
Qty: 3 EACH | Refills: 5 | Status: SHIPPED | OUTPATIENT
Start: 2025-04-22

## 2025-04-22 RX ADMIN — OXYCODONE HYDROCHLORIDE 10 MG: 10 TABLET ORAL at 12:33

## 2025-04-22 RX ADMIN — DICYCLOMINE HYDROCHLORIDE 10 MG: 10 CAPSULE ORAL at 18:13

## 2025-04-22 RX ADMIN — SUCRALFATE 1 G: 1 TABLET ORAL at 17:22

## 2025-04-22 RX ADMIN — Medication 5 ML: at 07:25

## 2025-04-22 RX ADMIN — LINACLOTIDE 145 MCG: 145 CAPSULE, GELATIN COATED ORAL at 08:17

## 2025-04-22 RX ADMIN — ONDANSETRON 4 MG: 4 TABLET, ORALLY DISINTEGRATING ORAL at 22:13

## 2025-04-22 RX ADMIN — HYDROCORTISONE 15 MG: 10 TABLET ORAL at 22:00

## 2025-04-22 RX ADMIN — GABAPENTIN 100 MG: 100 CAPSULE ORAL at 15:17

## 2025-04-22 RX ADMIN — PANCRELIPASE 7 CAPSULE: 60000; 12000; 38000 CAPSULE, DELAYED RELEASE PELLETS ORAL at 18:14

## 2025-04-22 RX ADMIN — SUCRALFATE 1 G: 1 TABLET ORAL at 22:01

## 2025-04-22 RX ADMIN — LEVOTHYROXINE SODIUM 125 MCG: 0.12 TABLET ORAL at 08:16

## 2025-04-22 RX ADMIN — PANCRELIPASE 7 CAPSULE: 60000; 12000; 38000 CAPSULE, DELAYED RELEASE PELLETS ORAL at 11:24

## 2025-04-22 RX ADMIN — PANTOPRAZOLE SODIUM 40 MG: 40 TABLET, DELAYED RELEASE ORAL at 22:01

## 2025-04-22 RX ADMIN — CYCLOBENZAPRINE HYDROCHLORIDE 5 MG: 5 TABLET, FILM COATED ORAL at 22:13

## 2025-04-22 RX ADMIN — GABAPENTIN 100 MG: 100 CAPSULE ORAL at 08:16

## 2025-04-22 RX ADMIN — FAMOTIDINE 20 MG: 20 TABLET, FILM COATED ORAL at 22:00

## 2025-04-22 RX ADMIN — SUCRALFATE 1 G: 1 TABLET ORAL at 08:16

## 2025-04-22 RX ADMIN — TOPIRAMATE 100 MG: 50 TABLET, FILM COATED ORAL at 22:00

## 2025-04-22 RX ADMIN — METOCLOPRAMIDE 10 MG: 5 TABLET ORAL at 15:17

## 2025-04-22 RX ADMIN — GABAPENTIN 100 MG: 100 CAPSULE ORAL at 22:00

## 2025-04-22 RX ADMIN — DICYCLOMINE HYDROCHLORIDE 10 MG: 10 CAPSULE ORAL at 23:20

## 2025-04-22 RX ADMIN — TOPIRAMATE 100 MG: 50 TABLET, FILM COATED ORAL at 08:16

## 2025-04-22 RX ADMIN — DICYCLOMINE HYDROCHLORIDE 10 MG: 10 CAPSULE ORAL at 05:36

## 2025-04-22 RX ADMIN — THIAMINE HCL TAB 100 MG 100 MG: 100 TAB at 08:15

## 2025-04-22 RX ADMIN — METOCLOPRAMIDE 10 MG: 5 TABLET ORAL at 08:13

## 2025-04-22 RX ADMIN — ACETAMINOPHEN 975 MG: 325 TABLET, FILM COATED ORAL at 17:22

## 2025-04-22 RX ADMIN — PANTOPRAZOLE SODIUM 40 MG: 40 TABLET, DELAYED RELEASE ORAL at 08:16

## 2025-04-22 RX ADMIN — TRAZODONE HYDROCHLORIDE 100 MG: 100 TABLET ORAL at 22:00

## 2025-04-22 RX ADMIN — ACETAMINOPHEN 975 MG: 325 TABLET, FILM COATED ORAL at 08:13

## 2025-04-22 RX ADMIN — DICYCLOMINE HYDROCHLORIDE 10 MG: 10 CAPSULE ORAL at 11:24

## 2025-04-22 RX ADMIN — SENNOSIDES 2 TABLET: 8.6 TABLET, FILM COATED ORAL at 08:15

## 2025-04-22 RX ADMIN — OXYCODONE HYDROCHLORIDE 10 MG: 10 TABLET ORAL at 22:13

## 2025-04-22 RX ADMIN — SUCRALFATE 1 G: 1 TABLET ORAL at 11:24

## 2025-04-22 RX ADMIN — POTASSIUM CHLORIDE 20 MEQ: 750 TABLET, EXTENDED RELEASE ORAL at 08:13

## 2025-04-22 RX ADMIN — DEXTROSE MONOHYDRATE 50 ML: 25 INJECTION, SOLUTION INTRAVENOUS at 05:38

## 2025-04-22 RX ADMIN — Medication 10 ML: at 17:23

## 2025-04-22 RX ADMIN — PANCRELIPASE 7 CAPSULE: 60000; 12000; 38000 CAPSULE, DELAYED RELEASE PELLETS ORAL at 08:16

## 2025-04-22 RX ADMIN — HYDROCORTISONE 10 MG: 10 TABLET ORAL at 08:15

## 2025-04-22 RX ADMIN — CYCLOBENZAPRINE HYDROCHLORIDE 5 MG: 5 TABLET, FILM COATED ORAL at 12:33

## 2025-04-22 RX ADMIN — METOCLOPRAMIDE 10 MG: 5 TABLET ORAL at 22:01

## 2025-04-22 RX ADMIN — OXYCODONE HYDROCHLORIDE 10 MG: 10 TABLET ORAL at 18:12

## 2025-04-22 RX ADMIN — ACETAMINOPHEN 975 MG: 325 TABLET, FILM COATED ORAL at 23:20

## 2025-04-22 RX ADMIN — DEXTROSE MONOHYDRATE 50 ML: 25 INJECTION, SOLUTION INTRAVENOUS at 02:27

## 2025-04-22 RX ADMIN — Medication 5 ML: at 02:30

## 2025-04-22 RX ADMIN — Medication 15 ML: at 08:12

## 2025-04-22 RX ADMIN — DULOXETINE 90 MG: 60 CAPSULE, DELAYED RELEASE ORAL at 08:14

## 2025-04-22 RX ADMIN — DEXTROSE MONOHYDRATE 25 ML: 25 INJECTION, SOLUTION INTRAVENOUS at 08:28

## 2025-04-22 RX ADMIN — POLYETHYLENE GLYCOL 3350 17 G: 17 POWDER, FOR SOLUTION ORAL at 08:17

## 2025-04-22 ASSESSMENT — ACTIVITIES OF DAILY LIVING (ADL)
ADLS_ACUITY_SCORE: 61
ADLS_ACUITY_SCORE: 64
ADLS_ACUITY_SCORE: 61
ADLS_ACUITY_SCORE: 64
ADLS_ACUITY_SCORE: 64
ADLS_ACUITY_SCORE: 61

## 2025-04-22 NOTE — PLAN OF CARE
"Goal Outcome Evaluation:      Plan of Care Reviewed With: patient    Overall Patient Progress: improvingOverall Patient Progress: improving       BP 96/65 (BP Location: Left arm, Patient Position: Semi-Amaya's, Cuff Size: Adult Small)   Pulse 99   Temp 98.4  F (36.9  C) (Oral)   Resp 16   Ht 1.575 m (5' 2\")   Wt 50.4 kg (111 lb 1.6 oz)   SpO2 100%   BMI 20.32 kg/m       Activity: SBA, dizziness upon standing  Neuros: A&Ox4  Cardiac: WDL  Respiratory: WDL, denies SOB  GI/: WDL, carb coverage insulin, void spontaneously, hat in toilet  Diet: TF @ 45ml/hr continuous   Skin/Incisions: edema bilateral low extremities, team aware  Lines/Drains: G/J tube leaking, dressing changed x1, L PICC hep locked  Pain/Nausea: nausea continuous, zofran & compazine given, pain managed w/ PRN oxy & toradol   New Changes: BG 45 at 0220, apple juice and dextrose  "

## 2025-04-22 NOTE — PROGRESS NOTES
[facility]    Medicine Progress Note - Hospitalist Service, GOLD TEAM 7    Date of Admission:  4/15/2025    Assessment & Plan   Patient is a 59-year-old female admitted on 4/15/2025 with a significant past medical history of insulin-dependent diabetes mellitus after pancreatectomy, chronic pancreatitis, migraine, hypothyroidism, and G tube nutrition who presented to the ED with a displaced  tube and was admitted for IR replacement of the G tube.     Updates Today:  - Tube feeds at goal, still vomiting  - Added Relizorb  - Hypoglycemic overnight NPH was held during the day and insulin adjusted by endocrine  - will need wheelchair for home at this time per PT  - ordered Dexcom G7 sensor from [facility]  - DME ordered wheelchair - face to face at bottom    PEG tube displacement s/p exchange 4/17  Bloating  Patient reports that her G tube became dislodged approximately one day prior to admission with an audible pop. She notes distention of her abdomen with bloating and pain. IR was consulted for GJ replacement however their team is unable to replace GJ tube d/t need for general anesthesia and challenging exchange. PEG-J exchange with GI on 4/17.   - general surgery consulted, appreciate assistance   - GI luminal consulted, appreciate assistance   - vent g-tube to help with distention   - if continued abdominal pain/nausea, obtain AXR to ensure PEG-J in correct place   - G-tube for meds/venting, J-tube for feeds/flushes    - Repeated abdominal x-ray 4/22 PEG tube in good position no changes    Post-pancreatectomy diabetes (type 1)  TPAIT (2012)  She was previously seen outpatient by both endocrinology and PCP. Her A1c on 1/23 was extremely high at 17.1, repeat A1c 19.1% on 4/15/25. Her glucose values have ranged in the 400s to 900s outpatient. She was seen by endocrinology, who report that it is unlikely she is taking her dose of insulin at this A1c and  recommended exchange for new insulin. On admission, glucose was in the 500s, though patient had no signs of DKA. Per assessment of her endocrinologist, this is likely part of the cause of her malnutrition. Started on insulin gtt.   - endocrinology consulted, appreciate assistance   - Lantus, NPH, aspart insulin all per endocrine medicine changed today  - hold PTA insulin pump and CGMS while on insulin gtt  - continue PTA Creon      Acute on chronic pain  Patient reports severe pain in her abdomen and back. She reports that she has previously taken methadone for this, but no record of methadone later than 2010 could be located in her chart. CT A/P on admission unremarkable. Large amount of discrepancy regarding what her pain regimen was prior to admission, I.e. shared that she was taking methadone 20 mg 4x/day PRN for pain as well as oxycodone? National  for the past year does show prescriptions for methadone or oxycodone at all or consistently.   - pain team consulted, appreciate assistance (signed off 4/16)               - acetaminophen 975 mg PO q8h              - flexeril 5 mg PO TID PRN              - oxycodone 5 mg PO q4h PRN while inpatient while workup ongoing                           - please provide very short tapering script upon discharge               - consider gabapentin 100 mg TID               - lidocaine patches 1-3 patches               - menthol patches, 1 patch q8h               - bowel regimen   - pharmacy consulted for med [facility], appreciate assistance     Hypotension - improving   Hypotensive with SBPs 70s-80s, asymptomatic. Received 500 ml bolus with improvement. Likely multifactorial including sedation from recent procedure, pain/sleep medication, and low rate of nutrition.   - continue to monitor      Malnutrition, severe  Cachexia   Patient reports > 30 lb weight loss in the last 6 months. Per endocrinology notes, this is likely multifactorial due to GI issues and uncontrolled diabetes,  chronic abdominal pain. PEG-J exchanged on 4/17, tube feedings started.   - nutrition consulted, appreciate assistance   - continue PTA tube feeds      Hx of adrenal insufficiency   Followed by Dr. Maher. Previously suppressed AM cortisol and has been on empiric therapy for possible adrenal insufficiency, unclear if central vs transient. Most recent clinic note describes inability to wean steroids due to hypoglycemia episodes.   - continue PTA hydrocortisone      Concern for malignancy  Elevated CEA   Patient was seen and evaluated by PCP with a stat CTCAP to assess for malignancy on 1/23. This scan was negative. She was scheduled for follow up with her PCP but has yet to follow up with her PCP. She notes elevated CEA since she was in [place] but has not had a workup showing any cancer at this point however tells me that she is working with her PCP on next steps.   - continue follow-up with PCP      Discharge planning:  - place referral to [facility] pain clinic   - follow up with PCP regarding elevated CEA   - [facility] currently scheduled for 5/13/25 and 8/21/25  - PT/OT rec: home with assist; home with home OT/PT    face to face documentation:  Patient has mobility limitation that significantly impairs his/her ability to participate in mobility-related activities of daily living (MRADL S) due to weight loss, abdominal pain, chronic pancreatitis, diabetes, and severe malnutrition and cachexia.  Mobility limitation cannot be sufficiently and safely resolved by use of a cane, walker or crutches due to severe deconditioning. Patient or caregiver are able to safely use the manual wheelchair Patient s functional mobility deficit can be sufficiently resolved by use of a manual wheelchair  Patient's home provides adequate access between rooms, maneuvering space and surfaces for use of the manual wheelchair * Patient has not expressed an unwillingness to use the manual wheelchair that is provided in the home.       Diet: Regular Diet Adult  Adult Formula Drip Feeding: Continuous [employer] Peptide 1.5; Jejunostomy; Goal Rate: 45; mL/hr; see relizorb cartridge order    DVT Prophylaxis: Pneumatic Compression Devices  Mckee Catheter: Not present  Lines: PRESENT      PICC 04/19/25 Double Lumen Right Brachial vein medial Access. PICC okay to use.-Site Assessment: WDL      Cardiac Monitoring: None  Code Status: Full Code      Clinically Significant Risk Factors                               # Severe Malnutrition: based on nutrition assessment and treatment provided per dietitian's recommendations.    # Financial/Environmental Concerns: none         Social Drivers of Health    Food Insecurity: High Risk (4/16/2025)    Food Insecurity     Within the past 12 months, did you worry that your food would run out before you got money to buy more?: Yes     Within the past 12 months, did the food you bought just not last and you didn t have money to get more?: Yes   Depression: At risk (1/23/2025)    PHQ-2     PHQ-2 Score: 6          Disposition Plan     Medically Ready for Discharge: Anticipated Tomorrow             Rosy Little MD  Hospitalist Service, [facility] TEAM [address] [place] [address]  Securely message with [employer] (more info)  Text page via [facility] Paging/Directory   See signed in provider for up to date coverage information  ______________________________________________________________________    Interval History   Overnight she had some low blood sugars.  And then the nurse called her NPH today.  She continues to have pretty constant pain in her left upper abdomen.  Had more output from her feeding tube today    Physical Exam   Vital Signs: Temp: 97.7  F (36.5  C) Temp src: Oral BP: 100/66 Pulse: 105   Resp: 16 SpO2: 98 % O2 Device: None (Room air)    Weight: 111 lbs 1.6 oz    General Appearance: Cachectic woman in no acute distress  Respiratory: Clear to auscultation  bilaterally  Cardiovascular: Regular rate and rhythm, S1, S2, no murmurs rubs or gallops  GI: Soft, nontender bowel sounds normal active feeding tube in place  Skin: Warm dry  Other: Edematous in the legs 2+ bilaterally    Medical Decision Making       MANAGEMENT DISCUSSED with the following over the past 24 hours: endocrine, nursing,    NOTE(S)/MEDICAL RECORDS REVIEWED over the past 24 hours: endo, nursing, nutrition        Data     I have personally reviewed the following data over the past 24 hrs:    6.9  \   10.2 (L)   / 404     139 103 20.1 /  124 (H)   4.1 27 0.44 (L) \       Imaging results reviewed over the past 24 hrs:   Recent Results (from the past 24 hours)   XR Abdomen Port 1 View    Narrative    EXAM: XR ABDOMEN PORT 1 VIEW  4/22/2025 1:35 PM     HISTORY:  stool burdenen? PEG placement       TECHNIQUE: Single frontal radiograph of the abdomen    COMPARISON:  4/18/2025    FINDINGS:   AP portable supine radiograph of the abdomen. Percutaneous  gastrojejunostomy tube in similar position with tip projecting over  the left hemiabdomen. Moderate to severe stool burden. Extensive  postsurgical changes of the abdomen, better appreciated on CT  4/16/2025. Nonobstructive bowel gas pattern. No pneumatosis or portal  venous gas.      Impression    IMPRESSION:  1. Similar position of percutaneous gastrojejunostomy tube with tip  projecting over the left hemiabdomen, compared to radiograph  4/18/2025.  2. Moderate to severe stool burden.  3. Nonobstructive bowel gas pattern.    I have personally reviewed the examination and initial interpretation  and I agree with the findings.    PAULA ABDALLA MD         SYSTEM ID:  [id number]

## 2025-04-22 NOTE — PROGRESS NOTES
CLINICAL NUTRITION SERVICES - REASSESSMENT NOTE     RECOMMENDATIONS FOR MDs/PROVIDERS TO ORDER:  Recommend at least 900 ml free water daily (PO vs flushes via FT).     Registered Dietitian Interventions:  Continue Sag[employer] Peptide 1.5 @ goal rate of 45 ml/hr continuously + Relizorb (1 cartridge changed q 8 hours).      Clarified with RN[facility]- pt to switch home infusion companies and can obtain Relizorb with TF. Discussed with pt need for PERT with TF (Relizorb) and recommendations to continue current TF formula on discharge with switch in home infusion company.      Future/Additional Recommendations:  Monitor weight trends with change to new formula/using Relizorb.      Consider adjustments to cycled regimen after BG stabilizes on continuous regimen.      INFORMATION OBTAINED  Assessed patient in room.    CURRENT NUTRITION ORDERS  Diet: Regular  Nutrition Support: Sag[employer] Peptide 1.5 (or equivalent) @ 45 mL/hr (1080 mL/day) + Relizorb for PERT    Provides 1661 kcal (36 kcal/kg), 80 g protein (1.75 g/kg), 149 g CHO, 16 g fiber, 83 g fat (40% from MCTs), and 756 mL free water daily      CURRENT INTAKE/TOLERANCE  7 day average of 173 ml TF daily per I/O, providing 266 kcal and 13 grams protein (~20% of assessed needs).  Most likely this is related to inaccurate recording.    She reports being concerned that her TF is not running at her goal rate of 60 ml/hr.  Discussed that we don't have her home TF formula here (Diabetisource) and current formula is higher kcal/ml formula - current rate provides similar to her home rate/formula.     She notes that oral intake amounts are small, and sometimes she vomits them.      NEW FINDINGS  GI symptoms:  LBM 4/20,  PEGJ replacement on 4/17  Skin/wounds:  No issues     Nutrition-relevant labs:  Low BG overnight (45-61) , Vit A 0.21 (low), Vit B12 normal/high, Vit E 6.2 (normal), zinc 58.4 (low), Vit D total <25 (normal)  Nutrition-relevant medications:  NPH,  Med sliding scale  insulin, Miralax TID, Med sliding scale insulin, Lantus, MVI with minerals, Novolog 1 unit per 12 grams CHO, Carafate QID, Protonix, Reglan, Marinol PRN, Creon 12 - 7 caps TID with meals, 4 caps PRN with snacks  Weight: 50.4 kg (4/21), up ~5 kg from admission weight.  Continue current DW/assessed needs (see previous RD note)    MALNUTRITION  % Intake: </= 50% for >/= 5 days (severe) - unclear if accurate  % Weight Loss: Weight loss does not meet criteria   Subcutaneous Fat Loss: Buccal: Severe and Triceps: Severe  Muscle Loss: Clavicles (pectoralis and deltoids): Severe  Fluid Accumulation/Edema: Moderate to severe, 2-4+  Malnutrition Diagnosis: Severe malnutrition in the context of chronic illness  Malnutrition Present on Admission: Yes    EVALUATION OF THE PROGRESS TOWARD GOALS   Previous Goals  Total avg nutritional intake to meet a minimum of 30 kcal/kg and 1.2 g PRO/kg daily (per dosing wt 45.7 kg).   Evaluation: Unable to meet    Previous Nutrition Diagnosis  Altered gastrointestinal (GI) function   Evaluation: No change    NUTRITION DIAGNOSIS  Altered gastrointestinal (GI) function related to history of chronic pancreatitis/pancreatectomy and insulin dependent DM as evidenced by reliance on PERT and insulin.     INTERVENTIONS  Nutrition education content    GOALS  Total avg nutritional intake to meet a minimum of 30 kcal/kg and 1.2 g PRO/kg daily (per dosing wt 45.7 kg).      MONITORING/EVALUATION  Progress toward goals will be monitored and evaluated per policy.    Margot Payton, MS, RD, LD, CCTD, [facility]  7A + 7B (beds [id number])  Available on Vocera [7A or 7B Clinical Dietitian]   Weekend/Holiday: Vocera [Weekend Clinical Dietitian]

## 2025-04-22 NOTE — PROGRESS NOTES
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 Inpatient Diabetes Service consulted for glycemic management recommendations.          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 Poor control  (A1c 19.1 % 4/16/25, Hgb: 11.9)  Approaching HHS [glucose 918, calculated serum osmolality 299, ketones 1.51, vpH 7.43]    Plan/Recommendations:   - NPH 7 units at 0900 to cover tube feeds (dosed for Sagrario Marinelli at 45 ml/hr)   - Decrease NPH 7 --> 5 units to cover tube feeds at 2100  - PRN D10: Start if NPH given and tube feeds stopped/interrupted at 40 ml/hr for hypoglycemia prevention. OR start for low BG <80 at 10 ml/hr and titrate to keep -120   - Lantus 4 units q 24 hrs at 1800  -  Novolog Meal Coverage:  1 per 12 grams CHO, TID AC and PRN with snacks/supplements  - Novolog Correction Scale: 1 per 50 >140 qAC and x2 overnight during TF  - BG Monitoring:  q AC, HS and overnight during TFs  - Hypoglycemia protocol  - Carb counting protocol     Discussion:   Hypoglycemic overnight. No documentation of TF interruption though per nursing notes G/J is leaking. Cause of hypoglycemia potentially excess insulin overnight (does have a trend to dip lower overnight) vs incomplete absorption/digestion of TF. Will decrease NPH dose for overnight TF coverage.       Discharge Planning: **4/21  Medications:  Lantus, Novolog, NPH, Dexcom G7 sensors through specialty pharmacy with the following verbage in prescribing  "provider's note:  \"I have evaluated this patient in-person today and I am prescribing continuous glucose monitor for the following reasons:    -The patient has a diagnosis of diabetes mellitus.  -The patient has been using a blood glucose monitor to test 4+ times a day.  -The patient is insulin-treated with three or more daily injections of insulin (basal and rapid acting).  -The patient's insulin treatment regimen requires frequent adjustments due sliding scale, carb counting, and/or labile blood sugars.\"     Contacted discharge pharmacy regarding dexcom sensors. Pt needs to go through specialty pharmacy for dexcom fills as they are not covered under her Medicare Part D plan.   Education:  Ordered 4/20- review NPH and Lantus--previously on BID Lantus. Patient thinks she will be able to do NPH with everything printed out   (written plan provided 4/21, and pasted into AVS)    Outpatient Follow-up:  recommend [facility]; next scheduled 5/13/25 with Libby Jay RN (appt notes indicate pump restart under consideration) and 8/21/25 with Dr. Maher      Diabetes Management Discharge Plan     Patient will need the following supplies prescribed:   Lantus Solostar pens (too soon to fill)  Novolog Flexpens  Novoling NPH flexpens  \"BD\" (32G x 4mm) insulin pen needles  Sharps container  Alcohol swabs      Blood glucose monitoring: Restart CGM or check finger stick before meals and every 4-6 hours during eveining/overnight when getting tube feeds.       Glucose Control Regimen:  1) Long-acting insulin: glargine (Lantus) - take 4 units once daily at 6pm. Take at same time each day.     2) Rapid-acting insulin: aspart (Novolog) carbohydrate coverage/mealtime insulin - take 1 unit per 12 grams of carbohydrates before meals and snacks.     3) Rapid-acting insulin: aspart (Novolog) correction - see chart below. Take for high blood glucoses three times daily before meals when eating (or every 4-6 hours when receiving " tube feeding) and at bedtime.  You may add the correction dose to the carbohydrate coverage/mealtime insulin dose and give in one injection--ideally 10-15 minutes before a meal.  You may take the correction dose even if you skip a meal (as long as it has been 4 hours since previous correction dose).      Blood Glucose Insulin Before Meals When Eating or every 4-6 hours when receiving tube feeding:   Less than 140 0 units   140 -189  1 units   190 - 239 2 units   240 - 289 3 units   290 - 339 4 units    340 - 389  5 units   390 - 439 6 units   440 or more 7 units         4) Intermediate-acting insulin: Novolin N (NPH). Take to cover tube feeds (dosed for Sagrario Marinelli at 45 ml/hr)   - Take Novolin N (NPH) 7 units at 9AM   - Take Novolin N (NPH) 5 units at 9PM     (If your rate of tube feed were to decrease, you can reference the list below)  If TF rate at 20 ml per hour, give 3 units  If TF rate at 30 ml per hour, give 5 units  If TF rate from 40-45 ml/hr, give 7 units     Humulin N needs to be gently rilled/mixed before injecting.  Do not give the dose if the tube feeds will not run.    Please notify Inpatient Diabetes Service if changes are planned to steroids, nutrition, TPN/TF and anticipated procedures requiring prolonged NPO status.         Interval History/Review of Systems :   The last 24 hours progress and nursing notes reviewed.  - No acute events overnight.  - Continued issues with nausea and vomiting  - continued issues with pain.     Planned Procedures/Surgeries: none    Inpatient Glucose Control:       Recent Labs   Lab 04/22/25  1114 04/22/25  0835 04/22/25  0814 04/22/25  0730 04/22/25  0604 04/22/25  0535   * 134* 56* 61* 147* 58*             Medications Impacting Glycemia:   Steroids:  hydrocortisone 10 mg am, 15 mg HS (adrenal insufficiency)    D5W-containing solutions/medications: none   Other medications impacting glucose: none         Nutrition:   Orders Placed This Encounter      Regular  "Diet Adult    Supplements: none   TF: Goal TF: [employer] Peptide 1.5 (or equivalent) @ 45 mL/hr (1080 mL/day) to provide  149 g CHO daily   - 4/17: [employer] Peptide 1.5 @ 10mL/hr   4/18: [employer] Peptide 1.5 @ 20 - 40 mL/hr --> reduced back to 20 ml/hr at 2100  4/19: [employer] Peptide 1.5 @ 20 ml/hr--> advanced to 30 ml/hr at 0800 and will increase to 40 ml/hr this evening   4/20-present: [employer] Peptide 1.5 @ 45 ml/hr- goal provides 149 g CHO per day   TPN: none     [employer] Peptide 1.5 provides:  10 mL/hr = 1.38 g/hr  20 mL/hr = 2.76 g/hr  30 mL/hr = 4.14 g/hr  40 mL/hr = 5.52 g/hr  45 mL/hr = 6.21 g/hr        Diabetes History: see full consult note for complete diabetes history   Diabetes Type and Duration: Pancreatogenic diabetes s/p total pancreatectomy and islet autotransplant with insulin dependence since 1/2012  GAD65 antibody, zinc transporter 8 antibody, islet antibody, insulin antibody not available on epic search.     Numerous C-peptides:  Component      Latest Ref Rng 8/28/2018  6:47 AM 9/3/2018  6:41 PM 9/6/2018  8:00 AM 9/7/2018  6:55 AM 4/18/2019  11:04 AM 4/3/2025  2:01 PM   C-Peptide      0.9 - 6.9 ng/mL 0.3 (L)  0.2 (L)  1.8  2.8  1.8  0.5 (L)    Patient Fasting?           Yes          PTA Medication Regimen:  started new pens  - Lantus 18 units AM, 12 units PM  - Novolog ICR 1:16  - Novolog correction 1:35 (more intuitive dosing)  Missing doses?: denies  Historical Diabetes Medications: MDI, insulin pumps     Glucose monitoring device/frequency/trends: Has not picked up dexcom yet--does not think it has been filled. Accucheck (checking 4-6 times daily) running 400 to \"high\" generally  Hypoglycemia PTA:   - Frequency: none  - Severity: + hx of hypoglycemic seizures  - Awareness: absent/decreased  - Treatment: candies in purse, glucose liquid shot, glucose tabs     Outpatient Diabetes Provider: [facility]: Dr. Maher (LOV 4/3/25)  Formal Diabetes Education/Educator: yes        " "Physical Exam:   BP 96/65 (BP Location: Left arm, Patient Position: Semi-Amaya's, Cuff Size: Adult Small)   Pulse 99   Temp 98.4  F (36.9  C) (Oral)   Resp 16   Ht 1.575 m (5' 2\")   Wt 50.4 kg (111 lb 1.6 oz)   SpO2 98%   BMI 20.32 kg/m    Constitutional: tired-appearing patient in no acute distress.  Eyes: sclera anicteric.  Respiratory: No signs of labored breathing.         Data:     Lab Results   Component Value Date    A1C 19.1 (H) 04/15/2025    A1C 18.9 (H) 04/03/2025    A1C 17.1 (H) 01/23/2025    A1C 11.1 (H) 03/02/2023    A1C 13.3 (H) 09/26/2022    A1C 7.3 (H) 11/09/2020    A1C 6.7 (A) 11/21/2019    A1C 8.2 (H) 06/11/2019    A1C Canceled, Test credited 06/10/2019    A1C 9.6 (H) 04/18/2019       ROUTINE IP LABS (Last four results)  BMP  Recent Labs   Lab 04/22/25  1114 04/22/25  0835 04/22/25  0814 04/22/25  0730 04/21/25  0801 04/21/25  0655 04/20/25  0835 04/20/25  0710 04/19/25  0653 04/19/25  0645   NA  --   --   --  139  --  139  --  137  --  136   POTASSIUM  --   --   --  4.1  --  4.4  --  4.2  --  3.8   CHLORIDE  --   --   --  103  --  104  --  105  --  106   ELIZA  --   --   --  8.8  --  8.6*  --  8.5*  --  8.4*   CO2  --   --   --  27  --  24  --  22  --  22   BUN  --   --   --  20.1  --  19.7  --  18.6  --  11.3   CR  --   --   --  0.44*  --  0.43*  --  0.41*  --  0.55   * 134* 56* 61*   < > 162*   < > 170*   < > 124*    < > = values in this interval not displayed.     CBC  Recent Labs   Lab 04/22/25  0730 04/21/25  0655 04/20/25  0710 04/19/25  0645   WBC 6.9 7.6 5.4 5.2   RBC 3.26* 3.32* 3.17* 3.29*   HGB 10.2* 10.4* 9.9* 10.5*   HCT 30.9* 31.8* 29.2* 30.1*   MCV 95 96 92 92   MCH 31.3 31.3 31.2 31.9   MCHC 33.0 32.7 33.9 34.9   RDW 13.7 13.6 13.3 12.9    410 355 340     INRNo lab results found in last 7 days.    Inpatient Diabetes Service will continue to follow, please don't hesitate to contact the team with any questions or concerns.     Earlene Samuel PA-C  Inpatient " Diabetes Service  Available on [employer] or Secure Chat     Plan discussed with patient, bedside RN, and primary team via this note.    To contact Inpatient Diabetes Service:     7 AM - 5 PM: Page the IDS DAVID following the patient that day (see filed or incomplete progress notes/consult notes under Endocrinology)    OR if uncertain of provider assignment: page job code [phone]    5 PM - 7 AM: First call after hours is to primary service.    For urgent after-hours questions, page job code for on call fellow: [phone]     I spent a total of 50 minutes on the date of the encounter doing prep/post-work, chart review, history and exam, documentation and further activities per the note including lab review, multidisciplinary communication, counseling the patient and/or coordinating care regarding acute hyper/hypoglycemic management, as well as discharge management and planning/communication.

## 2025-04-23 ENCOUNTER — APPOINTMENT (OUTPATIENT)
Dept: OCCUPATIONAL THERAPY | Facility: CLINIC | Age: 60
DRG: 393 | End: 2025-04-23
Payer: MEDICARE

## 2025-04-23 ENCOUNTER — APPOINTMENT (OUTPATIENT)
Dept: PHYSICAL THERAPY | Facility: CLINIC | Age: 60
DRG: 393 | End: 2025-04-23
Payer: MEDICARE

## 2025-04-23 LAB
ANION GAP SERPL CALCULATED.3IONS-SCNC: 11 MMOL/L (ref 7–15)
BUN SERPL-MCNC: 20.4 MG/DL (ref 8–23)
CALCIUM SERPL-MCNC: 8.7 MG/DL (ref 8.8–10.4)
CHLORIDE SERPL-SCNC: 103 MMOL/L (ref 98–107)
CREAT SERPL-MCNC: 0.46 MG/DL (ref 0.51–0.95)
EGFRCR SERPLBLD CKD-EPI 2021: >90 ML/MIN/1.73M2
ERYTHROCYTE [DISTWIDTH] IN BLOOD BY AUTOMATED COUNT: 14.3 % (ref 10–15)
GLUCOSE BLDC GLUCOMTR-MCNC: 103 MG/DL (ref 70–99)
GLUCOSE BLDC GLUCOMTR-MCNC: 111 MG/DL (ref 70–99)
GLUCOSE BLDC GLUCOMTR-MCNC: 122 MG/DL (ref 70–99)
GLUCOSE BLDC GLUCOMTR-MCNC: 151 MG/DL (ref 70–99)
GLUCOSE BLDC GLUCOMTR-MCNC: 155 MG/DL (ref 70–99)
GLUCOSE BLDC GLUCOMTR-MCNC: 52 MG/DL (ref 70–99)
GLUCOSE BLDC GLUCOMTR-MCNC: 71 MG/DL (ref 70–99)
GLUCOSE BLDC GLUCOMTR-MCNC: 80 MG/DL (ref 70–99)
GLUCOSE SERPL-MCNC: 104 MG/DL (ref 70–99)
H PYLORI AG STL QL IA: NEGATIVE
HCO3 SERPL-SCNC: 26 MMOL/L (ref 22–29)
HCT VFR BLD AUTO: 29.9 % (ref 35–47)
HGB BLD-MCNC: 10 G/DL (ref 11.7–15.7)
MAGNESIUM SERPL-MCNC: 2 MG/DL (ref 1.7–2.3)
MCH RBC QN AUTO: 31.4 PG (ref 26.5–33)
MCHC RBC AUTO-ENTMCNC: 33.4 G/DL (ref 31.5–36.5)
MCV RBC AUTO: 94 FL (ref 78–100)
PHOSPHATE SERPL-MCNC: 4.6 MG/DL (ref 2.5–4.5)
PLATELET # BLD AUTO: 441 10E3/UL (ref 150–450)
POTASSIUM SERPL-SCNC: 4.2 MMOL/L (ref 3.4–5.3)
RBC # BLD AUTO: 3.18 10E6/UL (ref 3.8–5.2)
SODIUM SERPL-SCNC: 140 MMOL/L (ref 135–145)
WBC # BLD AUTO: 6.4 10E3/UL (ref 4–11)

## 2025-04-23 PROCEDURE — 250N000013 HC RX MED GY IP 250 OP 250 PS 637: Performed by: INTERNAL MEDICINE

## 2025-04-23 PROCEDURE — 99232 SBSQ HOSP IP/OBS MODERATE 35: CPT | Performed by: STUDENT IN AN ORGANIZED HEALTH CARE EDUCATION/TRAINING PROGRAM

## 2025-04-23 PROCEDURE — 99232 SBSQ HOSP IP/OBS MODERATE 35: CPT

## 2025-04-23 PROCEDURE — 97530 THERAPEUTIC ACTIVITIES: CPT | Mod: GP | Performed by: PHYSICAL THERAPIST

## 2025-04-23 PROCEDURE — 250N000013 HC RX MED GY IP 250 OP 250 PS 637

## 2025-04-23 PROCEDURE — 36415 COLL VENOUS BLD VENIPUNCTURE: CPT | Performed by: NURSE PRACTITIONER

## 2025-04-23 PROCEDURE — 84100 ASSAY OF PHOSPHORUS: CPT | Performed by: INTERNAL MEDICINE

## 2025-04-23 PROCEDURE — 87338 HPYLORI STOOL AG IA: CPT | Performed by: PEDIATRICS

## 2025-04-23 PROCEDURE — 97116 GAIT TRAINING THERAPY: CPT | Mod: GP | Performed by: PHYSICAL THERAPIST

## 2025-04-23 PROCEDURE — 97530 THERAPEUTIC ACTIVITIES: CPT | Mod: GO

## 2025-04-23 PROCEDURE — 258N000001 HC RX 258: Performed by: INTERNAL MEDICINE

## 2025-04-23 PROCEDURE — 250N000011 HC RX IP 250 OP 636

## 2025-04-23 PROCEDURE — 120N000002 HC R&B MED SURG/OB UMMC

## 2025-04-23 PROCEDURE — 83735 ASSAY OF MAGNESIUM: CPT | Performed by: INTERNAL MEDICINE

## 2025-04-23 PROCEDURE — 80048 BASIC METABOLIC PNL TOTAL CA: CPT | Performed by: NURSE PRACTITIONER

## 2025-04-23 PROCEDURE — 250N000011 HC RX IP 250 OP 636: Performed by: INTERNAL MEDICINE

## 2025-04-23 PROCEDURE — 85014 HEMATOCRIT: CPT | Performed by: NURSE PRACTITIONER

## 2025-04-23 PROCEDURE — 250N000013 HC RX MED GY IP 250 OP 250 PS 637: Performed by: STUDENT IN AN ORGANIZED HEALTH CARE EDUCATION/TRAINING PROGRAM

## 2025-04-23 RX ADMIN — TRAZODONE HYDROCHLORIDE 100 MG: 100 TABLET ORAL at 22:16

## 2025-04-23 RX ADMIN — DICYCLOMINE HYDROCHLORIDE 10 MG: 10 CAPSULE ORAL at 05:18

## 2025-04-23 RX ADMIN — SENNOSIDES 2 TABLET: 8.6 TABLET, FILM COATED ORAL at 09:05

## 2025-04-23 RX ADMIN — OXYCODONE HYDROCHLORIDE 10 MG: 10 TABLET ORAL at 14:16

## 2025-04-23 RX ADMIN — SUCRALFATE 1 G: 1 TABLET ORAL at 15:18

## 2025-04-23 RX ADMIN — PANCRELIPASE 7 CAPSULE: 60000; 12000; 38000 CAPSULE, DELAYED RELEASE PELLETS ORAL at 18:58

## 2025-04-23 RX ADMIN — DICYCLOMINE HYDROCHLORIDE 10 MG: 10 CAPSULE ORAL at 12:07

## 2025-04-23 RX ADMIN — SUCRALFATE 1 G: 1 TABLET ORAL at 22:16

## 2025-04-23 RX ADMIN — PROCHLORPERAZINE MALEATE 10 MG: 5 TABLET ORAL at 00:21

## 2025-04-23 RX ADMIN — ONDANSETRON 4 MG: 4 TABLET, ORALLY DISINTEGRATING ORAL at 05:18

## 2025-04-23 RX ADMIN — GABAPENTIN 100 MG: 100 CAPSULE ORAL at 14:16

## 2025-04-23 RX ADMIN — LINACLOTIDE 145 MCG: 145 CAPSULE, GELATIN COATED ORAL at 09:09

## 2025-04-23 RX ADMIN — GABAPENTIN 100 MG: 100 CAPSULE ORAL at 09:05

## 2025-04-23 RX ADMIN — FAMOTIDINE 20 MG: 20 TABLET, FILM COATED ORAL at 22:16

## 2025-04-23 RX ADMIN — OXYCODONE HYDROCHLORIDE 10 MG: 10 TABLET ORAL at 09:20

## 2025-04-23 RX ADMIN — DULOXETINE 90 MG: 60 CAPSULE, DELAYED RELEASE ORAL at 09:07

## 2025-04-23 RX ADMIN — TOPIRAMATE 100 MG: 50 TABLET, FILM COATED ORAL at 09:05

## 2025-04-23 RX ADMIN — METOCLOPRAMIDE 10 MG: 5 TABLET ORAL at 09:07

## 2025-04-23 RX ADMIN — HYDROCORTISONE 10 MG: 10 TABLET ORAL at 09:08

## 2025-04-23 RX ADMIN — METOCLOPRAMIDE 10 MG: 5 TABLET ORAL at 19:48

## 2025-04-23 RX ADMIN — PANTOPRAZOLE SODIUM 40 MG: 40 TABLET, DELAYED RELEASE ORAL at 09:07

## 2025-04-23 RX ADMIN — PANCRELIPASE 7 CAPSULE: 60000; 12000; 38000 CAPSULE, DELAYED RELEASE PELLETS ORAL at 10:53

## 2025-04-23 RX ADMIN — TOPIRAMATE 100 MG: 50 TABLET, FILM COATED ORAL at 19:48

## 2025-04-23 RX ADMIN — DEXTROSE MONOHYDRATE 50 ML: 25 INJECTION, SOLUTION INTRAVENOUS at 18:03

## 2025-04-23 RX ADMIN — HYDROCORTISONE 15 MG: 10 TABLET ORAL at 22:16

## 2025-04-23 RX ADMIN — SUCRALFATE 1 G: 1 TABLET ORAL at 09:06

## 2025-04-23 RX ADMIN — CYCLOBENZAPRINE HYDROCHLORIDE 5 MG: 5 TABLET, FILM COATED ORAL at 12:07

## 2025-04-23 RX ADMIN — PANTOPRAZOLE SODIUM 40 MG: 40 TABLET, DELAYED RELEASE ORAL at 19:48

## 2025-04-23 RX ADMIN — DICYCLOMINE HYDROCHLORIDE 10 MG: 10 CAPSULE ORAL at 18:58

## 2025-04-23 RX ADMIN — METOCLOPRAMIDE 10 MG: 5 TABLET ORAL at 15:18

## 2025-04-23 RX ADMIN — Medication 15 ML: at 09:10

## 2025-04-23 RX ADMIN — OXYCODONE HYDROCHLORIDE 10 MG: 10 TABLET ORAL at 05:18

## 2025-04-23 RX ADMIN — POLYETHYLENE GLYCOL 3350 17 G: 17 POWDER, FOR SOLUTION ORAL at 09:10

## 2025-04-23 RX ADMIN — Medication 10 ML: at 16:10

## 2025-04-23 RX ADMIN — GABAPENTIN 100 MG: 100 CAPSULE ORAL at 19:48

## 2025-04-23 RX ADMIN — OXYCODONE HYDROCHLORIDE 10 MG: 10 TABLET ORAL at 19:51

## 2025-04-23 RX ADMIN — POTASSIUM CHLORIDE 20 MEQ: 750 TABLET, EXTENDED RELEASE ORAL at 09:07

## 2025-04-23 RX ADMIN — ACETAMINOPHEN 975 MG: 325 TABLET, FILM COATED ORAL at 16:09

## 2025-04-23 RX ADMIN — PANCRELIPASE 7 CAPSULE: 60000; 12000; 38000 CAPSULE, DELAYED RELEASE PELLETS ORAL at 15:21

## 2025-04-23 RX ADMIN — ONDANSETRON 4 MG: 4 TABLET, ORALLY DISINTEGRATING ORAL at 14:16

## 2025-04-23 RX ADMIN — LEVOTHYROXINE SODIUM 125 MCG: 0.12 TABLET ORAL at 09:08

## 2025-04-23 RX ADMIN — ACETAMINOPHEN 975 MG: 325 TABLET, FILM COATED ORAL at 09:04

## 2025-04-23 RX ADMIN — DEXTROSE MONOHYDRATE 25 ML: 25 INJECTION, SOLUTION INTRAVENOUS at 05:00

## 2025-04-23 ASSESSMENT — ACTIVITIES OF DAILY LIVING (ADL)
ADLS_ACUITY_SCORE: 61
ADLS_ACUITY_SCORE: 64
ADLS_ACUITY_SCORE: 61
ADLS_ACUITY_SCORE: 64
ADLS_ACUITY_SCORE: 61
ADLS_ACUITY_SCORE: 64
ADLS_ACUITY_SCORE: 64
ADLS_ACUITY_SCORE: 61

## 2025-04-23 NOTE — PROGRESS NOTES
[facility]    Medicine Progress Note - Hospitalist Service, GOLD TEAM 7    Date of Admission:  4/15/2025    Assessment & Plan   Patient is a 59-year-old female admitted on 4/15/2025 with a significant past medical history of insulin-dependent diabetes mellitus after pancreatectomy, chronic pancreatitis, migraine, hypothyroidism, and G tube nutrition who presented to the ED with a displaced  tube and was admitted for IR replacement of the G tube.     Updates Today:  - No changes to pain Rx  - Follow up endocrinology recommendations  - Tolerating a diet today, no nausea or vomiting overnight  - Anticipate discharge home tomorrow.     PEG tube displacement s/p exchange 4/17  Bloating  Patient reports that her G tube became dislodged approximately one day prior to admission with an audible pop. She notes distention of her abdomen with bloating and pain. IR was consulted for GJ replacement however their team is unable to replace GJ tube d/t need for general anesthesia and challenging exchange. PEG-J exchange with GI on 4/17.   - general surgery consulted, appreciate assistance   - GI luminal consulted, appreciate assistance   - vent g-tube to help with distention  -Ab XR on 4/19 showed proper placement of J tube  - G-tube for meds/venting, J-tube for feeds/flushes    - Repeated abdominal x-ray 4/22 PEG tube in good position no changes  ADDENDUM: I anticipate that she will require tube feeds for greater than 90 days.    Post-pancreatectomy diabetes (type 1)  TPAIT (2012)  She was previously seen outpatient by both endocrinology and PCP. Her A1c on 1/23 was extremely high at 17.1, repeat A1c 19.1% on 4/15/25. Her glucose values have ranged in the 400s to 900s outpatient. She was seen by endocrinology, who report that it is unlikely she is taking her dose of insulin at this A1c and recommended exchange for new insulin. On admission, glucose was in the 500s, though patient had no  signs of DKA. Per assessment of her endocrinologist, this is likely part of the cause of her malnutrition. Started on insulin gtt.   - endocrinology consulted, appreciate assistance   - Lantus, NPH, aspart insulin all per endocrine medicine changed today  - hold PTA insulin pump and CGMS while on insulin gtt  - continue PTA Creon      Acute on chronic pain  Patient reports severe pain in her abdomen and back. She reports that she has previously taken methadone for this, but no record of methadone later than 2010 could be located in her chart. CT A/P on admission unremarkable. Large amount of discrepancy regarding what her pain regimen was prior to admission, I.e. shared that she was taking methadone 20 mg 4x/day PRN for pain as well as oxycodone? National  for the past year does show prescriptions for methadone or oxycodone at all or consistently.   - pain team consulted, appreciate assistance (signed off 4/16)               - acetaminophen 975 mg PO q8h              - flexeril 5 mg PO TID PRN              - oxycodone 5 mg PO q4h PRN while inpatient while workup ongoing                           - please provide very short tapering script upon discharge               - consider gabapentin 100 mg TID               - lidocaine patches 1-3 patches               - menthol patches, 1 patch q8h               - bowel regimen   - pharmacy consulted for med rec, appreciate assistance     Hypotension - improving   Hypotensive with SBPs 70s-80s, asymptomatic. Received 500 ml bolus with improvement. Likely multifactorial including sedation from recent procedure, pain/sleep medication, and low rate of nutrition.   - continue to monitor      Malnutrition, severe  Cachexia   Patient reports > 30 lb weight loss in the last 6 months. Per endocrinology notes, this is likely multifactorial due to GI issues and uncontrolled diabetes, chronic abdominal pain. PEG-J exchanged on 4/17, tube feedings started.   - nutrition consulted,  appreciate assistance   - continue PTA tube feeds      Hx of adrenal insufficiency   Followed by Dr. Maher. Previously suppressed AM cortisol and has been on empiric therapy for possible adrenal insufficiency, unclear if central vs transient. Most recent clinic note describes inability to wean steroids due to hypoglycemia episodes.   - continue PTA hydrocortisone      Concern for malignancy  Elevated CEA   Patient was seen and evaluated by PCP with a stat CTCAP to assess for malignancy on 1/23. This scan was negative. She was scheduled for follow up with her PCP but has yet to follow up with her PCP. She notes elevated CEA since she was in [place] but has not had a workup showing any cancer at this point however tells me that she is working with her PCP on next steps.   - continue follow-up with PCP      Discharge planning:  - place referral to [facility] pain clinic   - follow up with PCP regarding elevated CEA   - [facility] currently scheduled for 5/13/25 and 8/21/25  - PT/OT rec: home with assist; home with home OT/PT    face to face documentation:  Patient has mobility limitation that significantly impairs his/her ability to participate in mobility-related activities of daily living (MRADL S) due to weight loss, abdominal pain, chronic pancreatitis, diabetes, and severe malnutrition and cachexia.  Mobility limitation cannot be sufficiently and safely resolved by use of a cane, walker or crutches due to severe deconditioning. Patient or caregiver are able to safely use the manual wheelchair Patient s functional mobility deficit can be sufficiently resolved by use of a manual wheelchair  Patient's home provides adequate access between rooms, maneuvering space and surfaces for use of the manual wheelchair * Patient has not expressed an unwillingness to use the manual wheelchair that is provided in the home. This all can be written out together in a statement in your daily progress note, it just needs  to somehow include all the above information.        Diet: Regular Diet Adult  Adult Formula Drip Feeding: Continuous [employer] Peptide 1.5; Jejunostomy; Goal Rate: 45; mL/hr; see relizorb cartridge order    DVT Prophylaxis: Pneumatic Compression Devices  Mckee Catheter: Not present  Lines: PRESENT      PICC 04/19/25 Double Lumen Right Brachial vein medial Access. PICC okay to use.-Site Assessment: WDL      Cardiac Monitoring: None  Code Status: Full Code      Clinically Significant Risk Factors                               # Severe Malnutrition: based on nutrition assessment and treatment provided per dietitian's recommendations.    # Financial/Environmental Concerns: none         Social Drivers of Health    Food Insecurity: High Risk (4/16/2025)    Food Insecurity     Within the past 12 months, did you worry that your food would run out before you got money to buy more?: Yes     Within the past 12 months, did the food you bought just not last and you didn t have money to get more?: Yes   Depression: At risk (1/23/2025)    PHQ-2     PHQ-2 Score: 6          Disposition Plan     Medically Ready for Discharge: Anticipated Tomorrow             Chang Cho MD  Hospitalist Service, [facility] TEAM [address] [place] [address]  Securely message with [employer] (more info)  Text page via [facility] Paging/Directory   See signed in provider for up to date coverage information  ______________________________________________________________________    Interval History   -No acute events overnight  -Tolerating a diet. Preparing to eat a large omelette at time of interview   -Notes nausea is tolerable with PRN Rx  -States significant abdominal pain. Requested additional pain Rx. Discussed how given she was evaluated by the pain team I would not be uptitrating her pain Rx or giving one time dilaudid doses. She expressed understanding  -Discussed how she may be ready to discharge tomorrow    Physical Exam    Vital Signs: Temp: 98.1  F (36.7  C) Temp src: Oral BP: 93/66 Pulse: 105   Resp: 16 SpO2: 98 % O2 Device: None (Room air)    Weight: 111 lbs 1.6 oz    General Appearance: Cachectic woman in no acute distress  Respiratory: Clear to auscultation bilaterally  Cardiovascular: Regular rate and rhythm, no murmurs rubs or gallops  GI: Soft, nontender bowel sounds normal active feeding tube in place  Skin: Warm dry  Other: Edematous in the legs 2+ bilaterally    Medical Decision Making       40 MINUTES SPENT BY ME on the date of service doing chart review, history, exam, documentation & further activities per the note.      Data     I have personally reviewed the following data over the past 24 hrs:    N/A  \   N/A   / N/A     N/A N/A N/A /  111 (H)   N/A N/A N/A \       Imaging results reviewed over the past 24 hrs:   Recent Results (from the past 24 hours)   XR Abdomen Port 1 View    Narrative    EXAM: XR ABDOMEN PORT 1 VIEW  4/22/2025 1:35 PM     HISTORY:  stool burdenen? PEG placement       TECHNIQUE: Single frontal radiograph of the abdomen    COMPARISON:  4/18/2025    FINDINGS:   AP portable supine radiograph of the abdomen. Percutaneous  gastrojejunostomy tube in similar position with tip projecting over  the left hemiabdomen. Moderate to severe stool burden. Extensive  postsurgical changes of the abdomen, better appreciated on CT  4/16/2025. Nonobstructive bowel gas pattern. No pneumatosis or portal  venous gas.      Impression    IMPRESSION:  1. Similar position of percutaneous gastrojejunostomy tube with tip  projecting over the left hemiabdomen, compared to radiograph  4/18/2025.  2. Moderate to severe stool burden.  3. Nonobstructive bowel gas pattern.    I have personally reviewed the examination and initial interpretation  and I agree with the findings.    PAULA ABDALLA MD         SYSTEM ID:  [id number]

## 2025-04-23 NOTE — PROGRESS NOTES
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 Inpatient Diabetes Service consulted for glycemic management recommendations.          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 Poor control  (A1c 19.1 % 4/16/25, Hgb: 11.9)  Approaching HHS [glucose 918, calculated serum osmolality 299, ketones 1.51, vpH 7.43]    Plan/Recommendations:   - Decrease NPH 7 --> 5 units at 0900 to cover tube feeds (dosed for [employer] at 45 ml/hr)   - Decrease NPH 5 --> 3 units to cover tube feeds at 2100  - PRN D10: Start if NPH given and tube feeds stopped/interrupted at 40 ml/hr for hypoglycemia prevention. OR start for low BG <80 at 10 ml/hr and titrate to keep -120   - Lantus 4 units q 24 hrs at 1800  -  Novolog Meal Coverage:  1 per 12 grams CHO, TID AC and PRN with snacks/supplements  - Novolog Correction Scale: 1 per 50 >140 qAC and x2 overnight during TF  - BG Monitoring:  q AC, HS and overnight during TFs  - Hypoglycemia protocol  - Carb counting protocol     Discussion:   Hyperglycemic yesterday afternoon. No NPH given in AM due to recurrent hypoglycemia. Pt ate a late dinner and received carb coverage at 2200. BG was then checked 1 hour later and correction insulin given. This post-prandial correction lead to insulin stacking and next BG check was 80mg/dL. Pt treated this with apple juice, next check further decreased to 71 mg/dL. 25 mL of D50 given (12.5g cho).  For safety will reduce NPH doses in  "morning and evening, though I do suspect correcting the post-prandial sugar also played a role.       Discharge Planning:   Medications:  Lantus, Novolog, NPH, Dexcom G7 sensors through specialty pharmacy with the following verbage in prescribing provider's note:  \"I have evaluated this patient in-person today and I am prescribing continuous glucose monitor for the following reasons:    -The patient has a diagnosis of diabetes mellitus.  -The patient has been using a blood glucose monitor to test 4+ times a day.  -The patient is insulin-treated with three or more daily injections of insulin (basal and rapid acting).  -The patient's insulin treatment regimen requires frequent adjustments due sliding scale, carb counting, and/or labile blood sugars.\"     Contacted discharge pharmacy regarding dexcom sensors. Pt needs to go through specialty pharmacy for dexcom fills as they are not covered under her Medicare Part D plan.   Education:  Ordered 4/20- review NPH and Lantus--previously on BID Lantus. Patient thinks she will be able to do NPH with everything printed out   (written plan provided 4/21, and pasted into AVS)    Outpatient Follow-up:  recommend [facility]; next scheduled 5/13/25 with Libby Jay RN (appt notes indicate pump restart under consideration) and 8/21/25 with Dr. Maher      Diabetes Management Discharge Plan     Patient will need the following supplies prescribed:   Lantus Solostar pens (too soon to fill)  Novolog Flexpens  Novoling NPH flexpens  \"BD\" (32G x 4mm) insulin pen needles  Sharps container  Alcohol swabs      Blood glucose monitoring: Restart CGM or check finger stick before meals and every 4-6 hours during eveining/overnight when getting tube feeds.       Glucose Control Regimen:  1) Long-acting insulin: glargine (Lantus) - take 4 units once daily at 6pm. Take at same time each day.     2) Rapid-acting insulin: aspart (Novolog) carbohydrate coverage/mealtime insulin - take " 1 unit per 12 grams of carbohydrates before meals and snacks.     3) Rapid-acting insulin: aspart (Novolog) correction - see chart below. Take for high blood glucoses three times daily before meals when eating (or every 4-6 hours when receiving tube feeding) and at bedtime.  You may add the correction dose to the carbohydrate coverage/mealtime insulin dose and give in one injection--ideally 10-15 minutes before a meal.  You may take the correction dose even if you skip a meal (as long as it has been 4 hours since previous correction dose).      Blood Glucose Insulin Before Meals When Eating or every 4-6 hours when receiving tube feeding:   Less than 140 0 units   140 -189  1 units   190 - 239 2 units   240 - 289 3 units   290 - 339 4 units    340 - 389  5 units   390 - 439 6 units   440 or more 7 units         4) Intermediate-acting insulin: Novolin N (NPH). Take to cover tube feeds (dosed for [employer] at 45 ml/hr)   - Take Novolin N (NPH) 5 units at 9AM   - Take Novolin N (NPH) 3 units at 9PM     (If your rate of tube feed were to decrease, you can reference the list below)  If TF rate at 20 ml per hour, give 1 units  If TF rate at 30 ml per hour, give 3 units  If TF rate from 40-45 ml/hr, give 5 units     Humulin N needs to be gently rilled/mixed before injecting.  Do not give the dose if the tube feeds will not run.    Please notify Inpatient Diabetes Service if changes are planned to steroids, nutrition, TPN/TF and anticipated procedures requiring prolonged NPO status.         Interval History/Review of Systems :   The last 24 hours progress and nursing notes reviewed.  - No acute events overnight.  - Continued issues with nausea and vomiting  - continued issues with pain.     Planned Procedures/Surgeries: none    Inpatient Glucose Control:       Recent Labs   Lab 04/23/25  0521 04/23/25  0452 04/23/25  0225 04/22/25  2304 04/22/25  2120 04/22/25  1811   * 71 80 200* 201* 233*             Medications  "Impacting Glycemia:   Steroids:  hydrocortisone 10 mg am, 15 mg HS (adrenal insufficiency)    D5W-containing solutions/medications: none   Other medications impacting glucose: none         Nutrition:   Orders Placed This Encounter      Regular Diet Adult    Supplements: none   TF: Goal TF: [employer] Peptide 1.5 (or equivalent) @ 45 mL/hr (1080 mL/day) to provide  149 g CHO daily   - 4/17: [employer] Peptide 1.5 @ 10mL/hr   4/18: [employer] Peptide 1.5 @ 20 - 40 mL/hr --> reduced back to 20 ml/hr at 2100  4/19: [employer] Peptide 1.5 @ 20 ml/hr--> advanced to 30 ml/hr at 0800 and will increase to 40 ml/hr this evening   4/20-present: [employer] Peptide 1.5 @ 45 ml/hr- goal provides 149 g CHO per day   TPN: none     Sagrario [employer] Peptide 1.5 provides:  10 mL/hr = 1.38 g/hr  20 mL/hr = 2.76 g/hr  30 mL/hr = 4.14 g/hr  40 mL/hr = 5.52 g/hr  45 mL/hr = 6.21 g/hr        Diabetes History: see full consult note for complete diabetes history   Diabetes Type and Duration: Pancreatogenic diabetes s/p total pancreatectomy and islet autotransplant with insulin dependence since 1/2012  GAD65 antibody, zinc transporter 8 antibody, islet antibody, insulin antibody not available on epic search.     Numerous C-peptides:  Component      Latest Ref Rng 8/28/2018  6:47 AM 9/3/2018  6:41 PM 9/6/2018  8:00 AM 9/7/2018  6:55 AM 4/18/2019  11:04 AM 4/3/2025  2:01 PM   C-Peptide      0.9 - 6.9 ng/mL 0.3 (L)  0.2 (L)  1.8  2.8  1.8  0.5 (L)    Patient Fasting?           Yes          PTA Medication Regimen:  started new pens  - Lantus 18 units AM, 12 units PM  - Novolog ICR 1:16  - Novolog correction 1:35 (more intuitive dosing)  Missing doses?: denies  Historical Diabetes Medications: MDI, insulin pumps     Glucose monitoring device/frequency/trends: Has not picked up dexcom yet--does not think it has been filled. Accucheck (checking 4-6 times daily) running 400 to \"high\" generally  Hypoglycemia PTA:   - Frequency: none  - Severity: + hx of " "hypoglycemic seizures  - Awareness: absent/decreased  - Treatment: candies in purse, glucose liquid shot, glucose tabs     Outpatient Diabetes Provider: [facility]: Dr. Maher (LOV 4/3/25)  Formal Diabetes Education/Educator: yes        Physical Exam:   BP 93/66 (BP Location: Left arm, Patient Position: Sitting, Cuff Size: Adult Small)   Pulse 105   Temp 98.1  F (36.7  C) (Oral)   Resp 16   Ht 1.575 m (5' 2\")   Wt 50.4 kg (111 lb 1.6 oz)   SpO2 98%   BMI 20.32 kg/m    Constitutional: tired-appearing patient in no acute distress.  Eyes: sclera anicteric.  Respiratory: No signs of labored breathing.          Data:     Lab Results   Component Value Date    A1C 19.1 (H) 04/15/2025    A1C 18.9 (H) 04/03/2025    A1C 17.1 (H) 01/23/2025    A1C 11.1 (H) 03/02/2023    A1C 13.3 (H) 09/26/2022    A1C 7.3 (H) 11/09/2020    A1C 6.7 (A) 11/21/2019    A1C 8.2 (H) 06/11/2019    A1C Canceled, Test credited 06/10/2019    A1C 9.6 (H) 04/18/2019       ROUTINE IP LABS (Last four results)  BMP  Recent Labs   Lab 04/23/25  0521 04/23/25  0452 04/23/25  0225 04/22/25  2304 04/22/25  0814 04/22/25  0730 04/21/25  0801 04/21/25  0655 04/20/25  0835 04/20/25  0710 04/19/25  0653 04/19/25  0645   NA  --   --   --   --   --  139  --  139  --  137  --  136   POTASSIUM  --   --   --   --   --  4.1  --  4.4  --  4.2  --  3.8   CHLORIDE  --   --   --   --   --  103  --  104  --  105  --  106   ELIZA  --   --   --   --   --  8.8  --  8.6*  --  8.5*  --  8.4*   CO2  --   --   --   --   --  27  --  24  --  22  --  22   BUN  --   --   --   --   --  20.1  --  19.7  --  18.6  --  11.3   CR  --   --   --   --   --  0.44*  --  0.43*  --  0.41*  --  0.55   * 71 80 200*   < > 61*   < > 162*   < > 170*   < > 124*    < > = values in this interval not displayed.     CBC  Recent Labs   Lab 04/22/25  0730 04/21/25  0655 04/20/25  0710 04/19/25  0645   WBC 6.9 7.6 5.4 5.2   RBC 3.26* 3.32* 3.17* 3.29*   HGB 10.2* 10.4* 9.9* 10.5*   HCT 30.9* " 31.8* 29.2* 30.1*   MCV 95 96 92 92   MCH 31.3 31.3 31.2 31.9   MCHC 33.0 32.7 33.9 34.9   RDW 13.7 13.6 13.3 12.9    410 355 340     INRNo lab results found in last 7 days.    Inpatient Diabetes Service will continue to follow, please don't hesitate to contact the team with any questions or concerns.     Earlene Samuel PA-C  Inpatient Diabetes Service  Available on [employer] [place] or Secure Chat     Plan discussed with patient, bedside RN, and primary team via this note.    To contact Inpatient Diabetes Service:     7 AM - 5 PM: Page the IDS DAVID following the patient that day (see filed or incomplete progress notes/consult notes under Endocrinology)    OR if uncertain of provider assignment: page job code [phone]    5 PM - 7 AM: First call after hours is to primary service.    For urgent after-hours questions, page job code for on call fellow: [phone]     I spent a total of 45 minutes on the date of the encounter doing prep/post-work, chart review, history and exam, documentation and further activities per the note including lab review, multidisciplinary communication, counseling the patient and/or coordinating care regarding acute hyper/hypoglycemic management, as well as discharge management and planning/communication.

## 2025-04-23 NOTE — PLAN OF CARE
"Goal Outcome Evaluation:      Plan of Care Reviewed With: patient    Overall Patient Progress: improvingOverall Patient Progress: improving       BP 93/66 (BP Location: Left arm, Patient Position: Sitting, Cuff Size: Adult Small)   Pulse 105   Temp 98.1  F (36.7  C) (Oral)   Resp 16   Ht 1.575 m (5' 2\")   Wt 50.4 kg (111 lb 1.6 oz)   SpO2 98%   BMI 20.32 kg/m       Activity: SBA, dizziness upon standing  Neuros: A&Ox4  Cardiac: WDL  Respiratory: WDL, denies SOB  GI/: WDL, carb coverage insulin, void spontaneously, hat in toilet  Diet: TF @ 45ml/hr continuous   Skin/Incisions: edema bilateral low extremities, redness around J tube site  Lines/Drains: G/J tube leaking, dressing changed x1, R PICC hep locked  Pain/Nausea: nausea continuous, zofran & compazine given, pain managed w/ PRN oxy & flexril  New Changes: BG drops overnight, BG 80 and given apple juice, recheck BG 71 and given 25ml of dextrose, BG recheck 111  "

## 2025-04-23 NOTE — PLAN OF CARE
"/73   Pulse 108   Temp 98.5  F (36.9  C) (Oral)   Resp 18   Ht 1.575 m (5' 2\")   Wt 50.4 kg (111 lb 1.6 oz)   SpO2 100%   BMI 20.32 kg/m      Goal Outcome Evaluation:       A&Ox4. VSS on RA. Pain managed w/ PRN oxycodone and flexeril. PICC HL. Nausea relieved w/ PRN zofran. G tube to gravity. J tube infusing TF @ 45 mL/hr -- dressing changed x2. Reports BM today. Voiding spontaneously. SBA. Regular diet. Labs reviewed.                 "

## 2025-04-23 NOTE — PROGRESS NOTES
Care Management Follow Up    Length of Stay (days): 7    Expected Discharge Date: 04/24/2025     Concerns to be Addressed: discharge planning      Patient plan of care discussed at interdisciplinary rounds: Yes    Anticipated Discharge Disposition: Home with services   Anticipated Discharge Services: Home Infusion, Home Care    [facility](TF)  Phone: [phone]  Fax: [phone]     [facility](RN/PT/OT)  Phone: [phone]  Fax: [phone]     Anticipated Discharge DME: Walker(PT will issue), Wheelchair    [facility](W/C)  4/21: Order placed on parachute  4/23: Uploaded face to face documentation to [place]     Patient/family educated on Medicare website which has current facility and service quality ratings: yes    Education Provided on the Discharge Plan: yes   Patient/Family in Agreement with the Plan: yes     Referrals Placed by CM/SW: Home Care, DME   Private pay costs discussed: Not applicable    Discussed  Partnership in Safe Discharge Planning  document with patient/family: No     Handoff Completed: Yes, [facility] PCP: Internal handoff referral completed    Additional Information:  Patient is anticipated to be medically stable for discharge tomorrow.  Patient will require a wheelchair for safe discharge.  Uploaded face to face documentation to [place] today, awaiting approval for the wheelchair.  RNCC will continue to follow and assist with discharge planning.      Next Steps:   [] Update [facility] and [facility] on day of discharge, ensure orders are placed  [] Follow up on w/c order  [] IMM needed  [x] Internal hand off Ron Morgan, [facility]  Phone: [phone]   Med Surg Vocera  Nurse Coordinator

## 2025-04-23 NOTE — PROGRESS NOTES
Met with Pt at bedside and went over teaching of enteral feeding and how to set up backpack, Infinity pump, and feeding bag. Demonstrated how to set up with her .  Pt did have another provider for TF but wants to go with [facility]. Instructed for her to call and cancel the other company prior to dc. And she agreed to do this. Went over discharge order. Along with 60ml of water flush every 4 hours and before and after. This Enteral Nurse Liaison provided Education on Enteral Therapy, including bag/air removal, feeding rate setup, priming, feeding tube flushing and backpack setup.  Encouraged to call [facility] for any questions, clarifications or problems.  Educated on Deliveries, importance of refrigerating open formula. Informed them of the supplies that will be delivered to the hospital on the day of discharge.  We went over the folder that will be in with the supplies and asked them to look through the folder and look over the service agreement when we get home and sign and send it back to [facility].  Went over when to contact [facility]/provider with them.  Encouraged for them to contact [facility] with any questions or concerns.  Pt stated she feels comfortable to administer TF independently.  Informed her that this writer will return on day of discharge to go over any questions at that time.    Thank you for the referral.  Savi Marks LPN  Enteral Nurse Liaison [facility]  [address] [place] [address]  [phone]

## 2025-04-23 NOTE — PLAN OF CARE
"Goal Outcome Evaluation:       /66 (BP Location: Left arm)   Pulse 105   Temp 97.7  F (36.5  C) (Oral)   Resp 16   Ht 1.575 m (5' 2\")   Wt 50.4 kg (111 lb 1.6 oz)   SpO2 98%   BMI 20.32 kg/m               Neuros:  Alert & oriented X4, calls appropriately  Cardiac: Tachy, denies chest pain  Respiratory: WDL, on RA   Activity: SBA  GI/: AUOP, no BM this shift  Diet: Regular, TF 45cc/hr continuous  Skin/Incisions/Drains: Peg,  right PICC hep. locked  Lines: Rt PICC hep locked  Pain: PRN oxycodone and scheduled meds for pain  Change: BG this morning was 56, dextrose 25 ml given, , no lower BG again  Plan: Continue with current POC       "

## 2025-04-23 NOTE — PLAN OF CARE
"Goal Outcome Evaluation:       /73   Pulse 108   Temp 98.5  F (36.9  C) (Oral)   Resp 18   Ht 1.575 m (5' 2\")   Wt 50.4 kg (111 lb 1.6 oz)   SpO2 100%   BMI 20.32 kg/m          Neuros:  Alert & oriented X4, calls appropriately  Cardiac: Tachy, denies chest pain  Respiratory: WDL, on RA   Activity: SBA  GI/: AUOP, no BM this shift  Diet: Regular, TF 45cc/hr continuous  Skin/Incisions/Drains: Peg,  right PICC hep. locked  Lines: Rt PICC hep locked  Pain: PRN oxycodone and scheduled meds for pain  Change: BG check at 1800 was 52, dextrose 50% 25 ML was given, rechecked 151 after 15 minutes  Plan: Continue with current POC                         "

## 2025-04-24 ENCOUNTER — HOME INFUSION BILLING (OUTPATIENT)
Dept: HOME HEALTH SERVICES | Facility: HOME HEALTH | Age: 60
End: 2025-04-24
Payer: MEDICARE

## 2025-04-24 ENCOUNTER — APPOINTMENT (OUTPATIENT)
Dept: OCCUPATIONAL THERAPY | Facility: CLINIC | Age: 60
DRG: 393 | End: 2025-04-24
Payer: MEDICARE

## 2025-04-24 ENCOUNTER — HOME INFUSION (OUTPATIENT)
Dept: HOME HEALTH SERVICES | Facility: HOME HEALTH | Age: 60
End: 2025-04-24
Payer: MEDICARE

## 2025-04-24 ENCOUNTER — APPOINTMENT (OUTPATIENT)
Dept: PHYSICAL THERAPY | Facility: CLINIC | Age: 60
DRG: 393 | End: 2025-04-24
Payer: MEDICARE

## 2025-04-24 VITALS
RESPIRATION RATE: 16 BRPM | HEART RATE: 109 BPM | HEIGHT: 62 IN | OXYGEN SATURATION: 100 % | BODY MASS INDEX: 20.44 KG/M2 | SYSTOLIC BLOOD PRESSURE: 106 MMHG | WEIGHT: 111.1 LBS | DIASTOLIC BLOOD PRESSURE: 71 MMHG | TEMPERATURE: 98.2 F

## 2025-04-24 LAB
ANION GAP SERPL CALCULATED.3IONS-SCNC: 8 MMOL/L (ref 7–15)
BUN SERPL-MCNC: 20.2 MG/DL (ref 8–23)
CALCIUM SERPL-MCNC: 8.4 MG/DL (ref 8.8–10.4)
CHLORIDE SERPL-SCNC: 104 MMOL/L (ref 98–107)
CREAT SERPL-MCNC: 0.43 MG/DL (ref 0.51–0.95)
EGFRCR SERPLBLD CKD-EPI 2021: >90 ML/MIN/1.73M2
ERYTHROCYTE [DISTWIDTH] IN BLOOD BY AUTOMATED COUNT: 14.6 % (ref 10–15)
GLUCOSE BLDC GLUCOMTR-MCNC: 102 MG/DL (ref 70–99)
GLUCOSE BLDC GLUCOMTR-MCNC: 121 MG/DL (ref 70–99)
GLUCOSE BLDC GLUCOMTR-MCNC: 157 MG/DL (ref 70–99)
GLUCOSE BLDC GLUCOMTR-MCNC: 222 MG/DL (ref 70–99)
GLUCOSE BLDC GLUCOMTR-MCNC: 244 MG/DL (ref 70–99)
GLUCOSE BLDC GLUCOMTR-MCNC: 262 MG/DL (ref 70–99)
GLUCOSE SERPL-MCNC: 266 MG/DL (ref 70–99)
HCO3 SERPL-SCNC: 27 MMOL/L (ref 22–29)
HCT VFR BLD AUTO: 28.2 % (ref 35–47)
HGB BLD-MCNC: 9.3 G/DL (ref 11.7–15.7)
MAGNESIUM SERPL-MCNC: 2 MG/DL (ref 1.7–2.3)
MCH RBC QN AUTO: 31.3 PG (ref 26.5–33)
MCHC RBC AUTO-ENTMCNC: 33 G/DL (ref 31.5–36.5)
MCV RBC AUTO: 95 FL (ref 78–100)
PHOSPHATE SERPL-MCNC: 4.5 MG/DL (ref 2.5–4.5)
PLATELET # BLD AUTO: 443 10E3/UL (ref 150–450)
POTASSIUM SERPL-SCNC: 4.6 MMOL/L (ref 3.4–5.3)
RBC # BLD AUTO: 2.97 10E6/UL (ref 3.8–5.2)
SODIUM SERPL-SCNC: 139 MMOL/L (ref 135–145)
WBC # BLD AUTO: 6.7 10E3/UL (ref 4–11)

## 2025-04-24 PROCEDURE — 97110 THERAPEUTIC EXERCISES: CPT | Mod: GP

## 2025-04-24 PROCEDURE — 80048 BASIC METABOLIC PNL TOTAL CA: CPT | Performed by: NURSE PRACTITIONER

## 2025-04-24 PROCEDURE — 250N000013 HC RX MED GY IP 250 OP 250 PS 637: Performed by: INTERNAL MEDICINE

## 2025-04-24 PROCEDURE — 250N000011 HC RX IP 250 OP 636: Performed by: INTERNAL MEDICINE

## 2025-04-24 PROCEDURE — B4153 EF HYDROLYZED/AMINO ACIDS: HCPCS

## 2025-04-24 PROCEDURE — 250N000013 HC RX MED GY IP 250 OP 250 PS 637: Performed by: STUDENT IN AN ORGANIZED HEALTH CARE EDUCATION/TRAINING PROGRAM

## 2025-04-24 PROCEDURE — 97535 SELF CARE MNGMENT TRAINING: CPT | Mod: GO

## 2025-04-24 PROCEDURE — B4105 ENZYME CARTRIDGE ENTERAL NUT: HCPCS | Mod: JZ

## 2025-04-24 PROCEDURE — 250N000013 HC RX MED GY IP 250 OP 250 PS 637

## 2025-04-24 PROCEDURE — S9342 HIT ENTERAL PUMP DIEM: HCPCS

## 2025-04-24 PROCEDURE — 250N000011 HC RX IP 250 OP 636: Performed by: STUDENT IN AN ORGANIZED HEALTH CARE EDUCATION/TRAINING PROGRAM

## 2025-04-24 PROCEDURE — 85014 HEMATOCRIT: CPT | Performed by: NURSE PRACTITIONER

## 2025-04-24 PROCEDURE — A4450 NON-WATERPROOF TAPE: HCPCS

## 2025-04-24 PROCEDURE — 36592 COLLECT BLOOD FROM PICC: CPT | Performed by: NURSE PRACTITIONER

## 2025-04-24 PROCEDURE — 84100 ASSAY OF PHOSPHORUS: CPT | Performed by: INTERNAL MEDICINE

## 2025-04-24 PROCEDURE — 97530 THERAPEUTIC ACTIVITIES: CPT | Mod: GP

## 2025-04-24 PROCEDURE — B4035 ENTERAL FEED SUPP PUMP PER D: HCPCS

## 2025-04-24 PROCEDURE — 99232 SBSQ HOSP IP/OBS MODERATE 35: CPT

## 2025-04-24 PROCEDURE — 83735 ASSAY OF MAGNESIUM: CPT | Performed by: INTERNAL MEDICINE

## 2025-04-24 PROCEDURE — 99239 HOSP IP/OBS DSCHRG MGMT >30: CPT | Performed by: STUDENT IN AN ORGANIZED HEALTH CARE EDUCATION/TRAINING PROGRAM

## 2025-04-24 RX ORDER — ACYCLOVIR 400 MG/1
1 TABLET ORAL ONCE
Qty: 1 EACH | Refills: 0 | Status: SHIPPED | OUTPATIENT
Start: 2025-04-24 | End: 2025-04-24

## 2025-04-24 RX ORDER — BLOOD PRESSURE TEST KIT
KIT MISCELLANEOUS
Qty: 200 EACH | Refills: 1 | Status: SHIPPED | OUTPATIENT
Start: 2025-04-24

## 2025-04-24 RX ORDER — ACYCLOVIR 400 MG/1
1 TABLET ORAL
Qty: 9 EACH | Refills: 5 | Status: SHIPPED | OUTPATIENT
Start: 2025-04-24 | End: 2025-04-30

## 2025-04-24 RX ORDER — GABAPENTIN 100 MG/1
100 CAPSULE ORAL 3 TIMES DAILY
Qty: 90 CAPSULE | Refills: 0 | Status: SHIPPED | OUTPATIENT
Start: 2025-04-24

## 2025-04-24 RX ORDER — FUROSEMIDE 40 MG/1
40 TABLET ORAL DAILY
Qty: 30 TABLET | Refills: 0 | Status: SHIPPED | OUTPATIENT
Start: 2025-04-24 | End: 2025-04-24

## 2025-04-24 RX ORDER — POTASSIUM CHLORIDE 750 MG/1
10 TABLET, EXTENDED RELEASE ORAL 2 TIMES DAILY
Qty: 30 TABLET | Refills: 0 | Status: SHIPPED | OUTPATIENT
Start: 2025-04-24 | End: 2025-04-24

## 2025-04-24 RX ORDER — OXYCODONE HYDROCHLORIDE 5 MG/1
TABLET ORAL
Qty: 16 TABLET | Refills: 0 | Status: SHIPPED | OUTPATIENT
Start: 2025-04-24 | End: 2025-04-30

## 2025-04-24 RX ORDER — CYCLOBENZAPRINE HCL 5 MG
5 TABLET ORAL 3 TIMES DAILY PRN
Qty: 90 TABLET | Refills: 0 | Status: SHIPPED | OUTPATIENT
Start: 2025-04-24 | End: 2025-04-24

## 2025-04-24 RX ORDER — CYCLOBENZAPRINE HCL 5 MG
TABLET ORAL
Qty: 90 TABLET | Refills: 0 | Status: SHIPPED | OUTPATIENT
Start: 2025-04-24

## 2025-04-24 RX ORDER — CYCLOBENZAPRINE HCL 10 MG
5 TABLET ORAL
Qty: 60 TABLET | Refills: 0 | Status: SHIPPED | OUTPATIENT
Start: 2025-04-24 | End: 2025-04-24

## 2025-04-24 RX ORDER — FUROSEMIDE 10 MG/ML
40 INJECTION INTRAMUSCULAR; INTRAVENOUS ONCE
Status: COMPLETED | OUTPATIENT
Start: 2025-04-24 | End: 2025-04-24

## 2025-04-24 RX ORDER — CONTAINER,EMPTY
EACH MISCELLANEOUS
Qty: 1 EACH | Refills: 1 | Status: SHIPPED | OUTPATIENT
Start: 2025-04-24

## 2025-04-24 RX ADMIN — FUROSEMIDE 40 MG: 10 INJECTION, SOLUTION INTRAMUSCULAR; INTRAVENOUS at 09:22

## 2025-04-24 RX ADMIN — OXYCODONE HYDROCHLORIDE 10 MG: 10 TABLET ORAL at 13:03

## 2025-04-24 RX ADMIN — METOCLOPRAMIDE 10 MG: 5 TABLET ORAL at 13:03

## 2025-04-24 RX ADMIN — METOCLOPRAMIDE 10 MG: 5 TABLET ORAL at 09:04

## 2025-04-24 RX ADMIN — SUCRALFATE 1 G: 1 TABLET ORAL at 09:04

## 2025-04-24 RX ADMIN — DICYCLOMINE HYDROCHLORIDE 10 MG: 10 CAPSULE ORAL at 11:19

## 2025-04-24 RX ADMIN — CYCLOBENZAPRINE HYDROCHLORIDE 5 MG: 5 TABLET, FILM COATED ORAL at 02:09

## 2025-04-24 RX ADMIN — LINACLOTIDE 145 MCG: 145 CAPSULE, GELATIN COATED ORAL at 09:02

## 2025-04-24 RX ADMIN — Medication 15 ML: at 09:03

## 2025-04-24 RX ADMIN — OXYCODONE HYDROCHLORIDE 10 MG: 10 TABLET ORAL at 05:58

## 2025-04-24 RX ADMIN — TOPIRAMATE 100 MG: 50 TABLET, FILM COATED ORAL at 09:04

## 2025-04-24 RX ADMIN — LEVOTHYROXINE SODIUM 125 MCG: 0.12 TABLET ORAL at 09:04

## 2025-04-24 RX ADMIN — SUCRALFATE 1 G: 1 TABLET ORAL at 11:19

## 2025-04-24 RX ADMIN — POLYETHYLENE GLYCOL 3350 17 G: 17 POWDER, FOR SOLUTION ORAL at 09:02

## 2025-04-24 RX ADMIN — OXYCODONE HYDROCHLORIDE 10 MG: 10 TABLET ORAL at 00:30

## 2025-04-24 RX ADMIN — DICYCLOMINE HYDROCHLORIDE 10 MG: 10 CAPSULE ORAL at 05:59

## 2025-04-24 RX ADMIN — CYCLOBENZAPRINE HYDROCHLORIDE 5 MG: 5 TABLET, FILM COATED ORAL at 12:43

## 2025-04-24 RX ADMIN — POLYETHYLENE GLYCOL 3350 17 G: 17 POWDER, FOR SOLUTION ORAL at 13:03

## 2025-04-24 RX ADMIN — HYDROCORTISONE 10 MG: 10 TABLET ORAL at 09:04

## 2025-04-24 RX ADMIN — DICYCLOMINE HYDROCHLORIDE 10 MG: 10 CAPSULE ORAL at 00:26

## 2025-04-24 RX ADMIN — ACETAMINOPHEN 975 MG: 325 TABLET, FILM COATED ORAL at 00:26

## 2025-04-24 RX ADMIN — PANCRELIPASE 7 CAPSULE: 60000; 12000; 38000 CAPSULE, DELAYED RELEASE PELLETS ORAL at 11:19

## 2025-04-24 RX ADMIN — DULOXETINE 90 MG: 60 CAPSULE, DELAYED RELEASE ORAL at 09:04

## 2025-04-24 RX ADMIN — GABAPENTIN 100 MG: 100 CAPSULE ORAL at 09:05

## 2025-04-24 RX ADMIN — PANTOPRAZOLE SODIUM 40 MG: 40 TABLET, DELAYED RELEASE ORAL at 09:04

## 2025-04-24 RX ADMIN — GABAPENTIN 100 MG: 100 CAPSULE ORAL at 13:03

## 2025-04-24 RX ADMIN — ONDANSETRON 4 MG: 4 TABLET, ORALLY DISINTEGRATING ORAL at 09:05

## 2025-04-24 RX ADMIN — SENNOSIDES 2 TABLET: 8.6 TABLET, FILM COATED ORAL at 09:23

## 2025-04-24 RX ADMIN — POTASSIUM CHLORIDE 20 MEQ: 750 TABLET, EXTENDED RELEASE ORAL at 09:03

## 2025-04-24 RX ADMIN — ACETAMINOPHEN 975 MG: 325 TABLET, FILM COATED ORAL at 09:03

## 2025-04-24 RX ADMIN — SENNOSIDES AND DOCUSATE SODIUM 2 TABLET: 50; 8.6 TABLET ORAL at 09:03

## 2025-04-24 RX ADMIN — OXYCODONE HYDROCHLORIDE 10 MG: 10 TABLET ORAL at 09:05

## 2025-04-24 ASSESSMENT — ACTIVITIES OF DAILY LIVING (ADL)
ADLS_ACUITY_SCORE: 64

## 2025-04-24 NOTE — PLAN OF CARE
"Problem: Adult Inpatient Plan of Care  Goal: Plan of Care Review    Outcome: Progressing     Goal Outcome Evaluation:  Pt A&Ox4. SBA. Tolerating regular diet & TF @ 45 mL/hr. Swallows w/ no difficulty-fair appetite. Hypotensive. Denies cardiac chest pain/vomiting. Reported SOB upon exertion and some nausea after eating. Zofran given. Voiding spontaneously. X2 BM this shift, passing flatus. G/J tube dressing CDI-G to gravity w/ J infusing TF. R DL PICC dressing CDI, Heparin locked. Abd pain managed w/ 10 mg oral Oxy and heat packs for back pain. Insulin given per sliding scale. VSS on RA. Medically ready for discharge home per care team. PICC removed. Educated pt on how to flush feeding tube at home and provided supplies.    BP 96/57 (BP Location: Left arm, Patient Position: Semi-Amaya's, Cuff Size: Adult Small)   Pulse 91   Temp 97.9  F (36.6  C) (Oral)   Resp 16   Ht 1.575 m (5' 2\")   Wt 50.4 kg (111 lb 1.6 oz)   SpO2 98%   BMI 20.32 kg/m      "

## 2025-04-24 NOTE — PROGRESS NOTES
DISCHARGE DATE: 04/24/25 (SOC)  THERAPY: Enteral only  DRUG/ DELIVERY MODE: PEGJ  FIRST HOME DOSE DUE: Continuously   DELIVERY: Delivery to the hospital today STAT  SUPPLIES: 2x2 gauze, backpack  Infinity pump, feeding bags, adult scale, flexi trak, y connectors, 60ml syringes, medication syringes, and med cups.  LINE/TUBE: PEGJ  SNV: [facility] RN/PT/OT tomorrow.  [facility] FOLLOWING PROVIDER: Dr. Ron Morgan  90 DAY LAURE NOTE: Yes-ADDENDUM: I anticipate that she will require tube feeds for greater than 90 days.  OTHER: Met with pt at bedside and went over discharge order on enteral feedings and water flushes.  Pt understood well.  She said she is going to hook herself up to feedings prior to leaving today.  She stated she feels good about being independent with administering TF due to having the same pump prior to this admission.  Went over the supplies that will be delivered to the hospital today.  Informed her that in with supplies is a folder and asked for her to look through as able, explained there is a service agreement and asked for her to look over, sign, and mail back to [facility]. Voiced that she understood.  Denies having any questions or concerns at this time.  Encouraged for her to contact [facility] with any questions or concerns.    Thank you for the referral.    Savi Marks LPN  [facility]  [address] [place] [address]  [phone]

## 2025-04-24 NOTE — PROGRESS NOTES
Care Management Discharge Note    Discharge Date: 04/24/2025       Discharge Disposition:  Home with services    Discharge Services: Home Care, Home Infusion,      [facility](TF)  Phone: [phone]  Fax: [phone]     [facility](RN/PT/OT)  Phone: [phone]  Fax: [phone]    Discharge DME: Walker(PT will issue), Wheelchair    [facility](W/C)-Will contact patient to arrange delivery to the patient's home.      Discharge Transportation: family or friend will provide    Private pay costs discussed: Not applicable    Does the patient's insurance plan have a 3 day qualifying hospital stay waiver?  No    PAS Confirmation Code: NA   Patient/family educated on Medicare website which has current facility and service quality ratings: yes     Education Provided on the Discharge Plan:  yes  Persons Notified of Discharge Plans: patient  Patient/Family in Agreement with the Plan: yes     Handoff Referral Completed: Yes, [facility] PCP: Internal handoff referral completed    Additional Information:  Received update from MD team patient is medically stable for discharge to home today.  Patient will discharge to home on tube feedings.  [facility] updated and they will plan to deliver TF and supplies to the hospital.  Followed up on wheelchair order from [employer], they will contact the patient to arrange delivery of the w/c to her home.  PT will issue the patient a walker prior to discharge.  Met with patient at bedside to review discharge plan.  Patient did not have any questions or concerns about the plan.  Patient's spouse will provide transportation to home.         GOLD Zamorano  Phone: [phone]   Med Surg Vocera  Nurse Coordinator

## 2025-04-24 NOTE — PROGRESS NOTES
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 Scheduling, requesting visit on NA    Per I care plan, labs are due on NO LABS    Nursing plan: Enteral Only. [facility] to follow for nursing until agency takes over case on NA.     Teaching Plan: Liaison Teach Done?: Yes    Other Info: PICC line to be pulled prior to discharge    Karena Begum RN 04/24/25

## 2025-04-24 NOTE — PROGRESS NOTES
CLINICAL NUTRITION SERVICES - BRIEF NOTE    Registered Dietitian Interventions:  Update RELIZORB CARTRIDGE orders per Medicare guidelines (updated in discharge instructions per team)  *Change 1 cartridge every 12 hours with TF rate >20 ml/hr    Future/Additional Recommendations:  -- if patient shows signs of malabsorption with change in Relizorb usage would recommend changing to Creon 24 + Sodium bicarbonate be added to the TF      CURRENT NUTRITION ORDERS  Diet: Regular  Nutrition Support: Sagrario Farms Peptide 1.5 (or equivalent) @ 45 mL/hr (1080 mL/day) to provide 1661 kcal (36 kcal/kg), 80 g protein (1.75 g/kg), 149 g CHO, 16 g fiber, 83 g fat (40% from MCTs), and 756 mL free water daily  + relizorb     CURRENT INTAKE/TOLERANCE  Labs: Nutrition-relevant labs: Reviewed    Medication: Nutrition-relevant medications:   Insulin aspart  Lantus PEN   NPH injection  Multivitamin w/minerals liquid  Miralax  Senokot    INTERVENTIONS  Updated Relizorb orders per Medicare guidelines   Collaboration by nutrition professional with other providers - discussed with [facility], [facility]     Monitoring/Evaluation  Progress toward goals will be monitored and evaluated per policy.    Denise Tomlin MS/RD/LD/CNSC  Available on Vidmaker   M-F (7am-3:30pm) - 7A/7B Clinical Dietitian  Weekend/Holiday Dietitian (7am-3:30pm)    ** Clinical Dietitians no longer available on pager

## 2025-04-24 NOTE — PROGRESS NOTES
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 Inpatient Diabetes Service consulted for glycemic management recommendations.          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 Poor control  (A1c 19.1 % 4/16/25, Hgb: 11.9)  Approaching HHS [glucose 918, calculated serum osmolality 299, ketones 1.51, vpH 7.43]    Plan/Recommendations:   - Decrease NPH 5 --> 4 units at 0900 to cover tube feeds (dosed for Sagrario Marinelli at 45 ml/hr)   - NPH 3 units to cover tube feeds at 2100  - PRN D10: Start if NPH given and tube feeds stopped/interrupted at 40 ml/hr for hypoglycemia prevention. OR start for low BG <80 at 10 ml/hr and titrate to keep -120   - Change Lantus 4 --> 3 units q 24 hrs at 1800  - Change Novolog Meal Coverage:  1 per 12 --> 14 grams CHO, TID AC and PRN with snacks/supplements  - Change Novolog Correction Scale: 1 per 50 >140 --> 1:50>175 qAC and x2 overnight during TF  - BG Monitoring:  q AC, HS and overnight during TFs  - Hypoglycemia protocol  - Carb counting protocol     Discussion:   Episode of hypoglycemia yesterday afternoon. Pt received correction insulin 1.5 hours after BG taken with carb coverage. 1 hour after insulin dose pt hypoglycemic to 52 mg/dL. Tight glycemic control overnight. Will reduce glargine and correction insulin. Will also reduce carb coverage slightly. Pt planning to discharge today. Discussed recommendations as below. Pt has no questions at this time and is  "understanding that she may need adjustments in the future.       Discharge Planning:   Medications:  Lantus, Novolog, NPH, Dexcom G7 sensors through specialty pharmacy with the following verbage in prescribing provider's note:  \"I have evaluated this patient in-person today and I am prescribing continuous glucose monitor for the following reasons:    -The patient has a diagnosis of diabetes mellitus.  -The patient has been using a blood glucose monitor to test 4+ times a day.  -The patient is insulin-treated with three or more daily injections of insulin (basal and rapid acting).  -The patient's insulin treatment regimen requires frequent adjustments due sliding scale, carb counting, and/or labile blood sugars.\"     Contacted discharge pharmacy regarding dexcom sensors. Pt needs to go through specialty pharmacy for dexcom fills as they are not covered under her Medicare Part D plan.   Education:  Ordered 4/20- review NPH and Lantus--previously on BID Lantus. Patient thinks she will be able to do NPH with everything printed out   (written plan provided 4/21, and pasted into AVS)    Outpatient Follow-up:  recommend [facility]; next scheduled 5/13/25 with Libby Jay RN (appt notes indicate pump restart under consideration) and 8/21/25 with Dr. Maher      Diabetes Management Discharge Plan     Patient will need the following supplies prescribed:   Lantus Solostar pens (too soon to fill)  Novolog Flexpens  Novoling NPH flexpens  \"BD\" (32G x 4mm) insulin pen needles  Sharps container  Alcohol swabs      Blood glucose monitoring: Restart CGM or check finger stick before meals and every 4-6 hours during eveining/overnight when getting tube feeds.       Glucose Control Regimen:  1) Long-acting insulin: glargine (Lantus) - take 3 units once daily at 6pm. Take at same time each day.     2) Rapid-acting insulin: aspart (Novolog) carbohydrate coverage/mealtime insulin - take 1 unit per 14 grams of carbohydrates " before meals and snacks.     3) Rapid-acting insulin: aspart (Novolog) correction - see chart below. Take for high blood glucoses three times daily before meals when eating (or every 4-6 hours when receiving tube feeding) and at bedtime.  You may add the correction dose to the carbohydrate coverage/mealtime insulin dose and give in one injection--ideally 10-15 minutes before a meal.  You may take the correction dose even if you skip a meal (as long as it has been 4 hours since previous correction dose).      Blood Glucose Insulin Before Meals When Eating or every 4-6 hours when receiving tube feeding:   Less than 175 0 units   175 -224  1 units   225 - 274 2 units   275 - 324 3 units   325 - 374 4 units    375 - 424  5 units   425 - 474 6 units   475 or more 7 units         4) Intermediate-acting insulin: Novolin N (NPH). Take to cover tube feeds (dosed for SagrarioLenda at 45 ml/hr)   - Take Novolin N (NPH) 4 units at 9AM   - Take Novolin N (NPH) 3 units at 9PM     (If your rate of tube feed were to decrease, you can reference the list below)  If TF rate at 20 ml per hour, give 1 units  If TF rate at 30 ml per hour, give 3 units  If TF rate from 40-45 ml/hr, give 5 units     Humulin N needs to be gently rilled/mixed before injecting.  Do not give the dose if the tube feeds will not run.    Please notify Inpatient Diabetes Service if changes are planned to steroids, nutrition, TPN/TF and anticipated procedures requiring prolonged NPO status.         Interval History/Review of Systems :   The last 24 hours progress and nursing notes reviewed.  - No acute events overnight.  - Continued issues with nausea and vomiting  - planning to discharge home today.     Planned Procedures/Surgeries: none    Inpatient Glucose Control:       Recent Labs   Lab 04/24/25  0857 04/24/25  0700 04/24/25  0601 04/24/25  0211 04/24/25  0033 04/23/25  2142   * 266* 244* 102* 121* 103*             Medications Impacting Glycemia:  "  Steroids:  hydrocortisone 10 mg am, 15 mg HS (adrenal insufficiency)    D5W-containing solutions/medications: none   Other medications impacting glucose: none         Nutrition:   Orders Placed This Encounter      Regular Diet Adult    Supplements: none   TF: Goal TF: Sagrario Farms Peptide 1.5 (or equivalent) @ 45 mL/hr (1080 mL/day) to provide  149 g CHO daily   - 4/17: Sagrario Farms Peptide 1.5 @ 10mL/hr   4/18: Sag[employer] Farms Peptide 1.5 @ 20 - 40 mL/hr --> reduced back to 20 ml/hr at 2100  4/19: Sag[employer] Farms Peptide 1.5 @ 20 ml/hr--> advanced to 30 ml/hr at 0800 and will increase to 40 ml/hr this evening   4/20-present: Sag[employer] Farms Peptide 1.5 @ 45 ml/hr- goal provides 149 g CHO per day   TPN: none     Sagrario Evolva Peptide 1.5 provides:  10 mL/hr = 1.38 g/hr  20 mL/hr = 2.76 g/hr  30 mL/hr = 4.14 g/hr  40 mL/hr = 5.52 g/hr  45 mL/hr = 6.21 g/hr        Diabetes History: see full consult note for complete diabetes history   Diabetes Type and Duration: Pancreatogenic diabetes s/p total pancreatectomy and islet autotransplant with insulin dependence since 1/2012  GAD65 antibody, zinc transporter 8 antibody, islet antibody, insulin antibody not available on epic search.     Numerous C-peptides:  Component      Latest Ref Rng 8/28/2018  6:47 AM 9/3/2018  6:41 PM 9/6/2018  8:00 AM 9/7/2018  6:55 AM 4/18/2019  11:04 AM 4/3/2025  2:01 PM   C-Peptide      0.9 - 6.9 ng/mL 0.3 (L)  0.2 (L)  1.8  2.8  1.8  0.5 (L)    Patient Fasting?           Yes          PTA Medication Regimen:  started new pens  - Lantus 18 units AM, 12 units PM  - Novolog ICR 1:16  - Novolog correction 1:35 (more intuitive dosing)  Missing doses?: denies  Historical Diabetes Medications: MDI, insulin pumps     Glucose monitoring device/frequency/trends: Has not picked up dexcom yet--does not think it has been filled. Accucheck (checking 4-6 times daily) running 400 to \"high\" generally  Hypoglycemia PTA:   - Frequency: none  - Severity: + hx of hypoglycemic " "seizures  - Awareness: absent/decreased  - Treatment: candies in purse, glucose liquid shot, glucose tabs     Outpatient Diabetes Provider: [facility]: Dr. Maher (LOV 4/3/25)  Formal Diabetes Education/Educator: yes        Physical Exam:   BP 96/57 (BP Location: Left arm, Patient Position: Semi-Amaya's, Cuff Size: Adult Small)   Pulse 91   Temp 97.9  F (36.6  C) (Oral)   Resp 16   Ht 1.575 m (5' 2\")   Wt 50.4 kg (111 lb 1.6 oz)   SpO2 98%   BMI 20.32 kg/m    Constitutional: tired-appearing patient in no acute distress.  Eyes: sclera anicteric.  Respiratory: No signs of labored breathing.          Data:     Lab Results   Component Value Date    A1C 19.1 (H) 04/15/2025    A1C 18.9 (H) 04/03/2025    A1C 17.1 (H) 01/23/2025    A1C 11.1 (H) 03/02/2023    A1C 13.3 (H) 09/26/2022    A1C 7.3 (H) 11/09/2020    A1C 6.7 (A) 11/21/2019    A1C 8.2 (H) 06/11/2019    A1C Canceled, Test credited 06/10/2019    A1C 9.6 (H) 04/18/2019       ROUTINE IP LABS (Last four results)  BMP  Recent Labs   Lab 04/24/25  0857 04/24/25  0700 04/24/25  0601 04/24/25  0211 04/23/25  0926 04/23/25  0718 04/22/25  0814 04/22/25  0730 04/21/25  0801 04/21/25  0655   NA  --  139  --   --   --  140  --  139  --  139   POTASSIUM  --  4.6  --   --   --  4.2  --  4.1  --  4.4   CHLORIDE  --  104  --   --   --  103  --  103  --  104   ELIZA  --  8.4*  --   --   --  8.7*  --  8.8  --  8.6*   CO2  --  27  --   --   --  26  --  27  --  24   BUN  --  20.2  --   --   --  20.4  --  20.1  --  19.7   CR  --  0.43*  --   --   --  0.46*  --  0.44*  --  0.43*   * 266* 244* 102*   < > 104*   < > 61*   < > 162*    < > = values in this interval not displayed.     CBC  Recent Labs   Lab 04/24/25  0700 04/23/25  0718 04/22/25  0730 04/21/25  0655   WBC 6.7 6.4 6.9 7.6   RBC 2.97* 3.18* 3.26* 3.32*   HGB 9.3* 10.0* 10.2* 10.4*   HCT 28.2* 29.9* 30.9* 31.8*   MCV 95 94 95 96   MCH 31.3 31.4 31.3 31.3   MCHC 33.0 33.4 33.0 32.7   RDW 14.6 14.3 13.7 " 13.6    [phone]     INRNo lab results found in last 7 days.    Inpatient Diabetes Service will continue to follow, please don't hesitate to contact the team with any questions or concerns.     Earlene Samuel PA-C  Inpatient Diabetes Service  Available on cookdinner or Secure Chat     Plan discussed with patient, bedside RN, and primary team via this note.    To contact Inpatient Diabetes Service:     7 AM - 5 PM: Page the IDS DAVID following the patient that day (see filed or incomplete progress notes/consult notes under Endocrinology)    OR if uncertain of provider assignment: page job code [phone]    5 PM - 7 AM: First call after hours is to primary service.    For urgent after-hours questions, page job code for on call fellow: [phone]     I spent a total of 45 minutes on the date of the encounter doing prep/post-work, chart review, history and exam, documentation and further activities per the note including lab review, multidisciplinary communication, counseling the patient and/or coordinating care regarding acute hyper/hypoglycemic management, as well as discharge management and planning/communication.

## 2025-04-24 NOTE — DISCHARGE SUMMARY
[facility]  Hospitalist Discharge Summary      Date of Admission:  4/15/2025  Date of Discharge:  4/24/2025  Discharging Provider: Chang Cho MD  Discharge Service: Hospitalist Service, GOLD TEAM 7    Discharge Diagnoses   Primary Diagnosis   PEG tube displacement, exchanged   Uncontrolled Type 1 Diabetes    Clinically Significant Risk Factors     # Severe Malnutrition: based on nutrition assessment and treatment provided per dietitian's recommendations.      Follow-ups Needed After Discharge   Follow-up Appointments       Hospital Follow-up with Existing Primary Care Provider (PCP)          Schedule Primary Care visit within: 30 Days           Special instructions to PCP: Follow up on elevated CEA level, may need oncologic workup    Discharge Disposition   Discharged to home  Condition at discharge: Stable    Hospital Course   Patient is a 59-year-old female admitted on 4/15/2025 with a significant past medical history of insulin-dependent diabetes mellitus after pancreatectomy, chronic pancreatitis, migraine, hypothyroidism, and G tube nutrition who presented to the ED with a displaced PEG. Had replacement PEG-GJ placed endoscopically by GI on 4/19. Advanced to full tube feeds. Sent with small taper of oxycodone 5mg for home. Insulin adjusted by endocrinology given A1c of 19 on admission (See details below). Has follow up with [facility] endocrinology on 5/13/25. A [facility] appointment and PCP follow up appointment were also requested.     PEG tube displacement s/p exchange 4/17  Bloating  Patient reports that her G tube became dislodged approximately one day prior to admission with an audible pop. She notes distention of her abdomen with bloating and pain. IR was consulted for GJ replacement however their team is unable to replace GJ tube d/t need for general anesthesia and challenging exchange. PEG-GJ placed endoscopically by GI on 4/17.   - general surgery  consulted, appreciate assistance   - GI luminal consulted, appreciate assistance   - vent g-tube to help with distention  -Ab XR on 4/19 showed proper placement of J tube  - G-tube for meds/venting, J-tube for feeds/flushes    - Repeated abdominal x-ray 4/22 PEG tube in good position no changes  - Provided education on flushing her GJ tube for management  ADDENDUM: anticipate that she will require tube feeds for greater than 90 days.    Post-pancreatectomy diabetes (type 1)  TPAIT (2012)  She was previously seen outpatient by both endocrinology and PCP. Her A1c on 1/23 was extremely high at 17.1, repeat A1c 19.1% on 4/15/25. Her glucose values have ranged in the 400s to 900s outpatient. She was seen by endocrinology, who report that it is unlikely she is taking her dose of insulin at this A1c and recommended exchange for new insulin. On admission, glucose was in the 500s, though patient had no signs of DKA. Per assessment of her endocrinologist, this is likely part of the cause of her malnutrition. Started on insulin gtt.   - endocrinology consulted, appreciate assistance   -Discharge regimen:  Glucose Control Regimen:  1) Long-acting insulin: glargine (Lantus) - take 3 units once daily at 6pm. Take at same time each day.     2) Rapid-acting insulin: aspart (Novolog) carbohydrate coverage/mealtime insulin - take 1 unit per 14 grams of carbohydrates before meals and snacks.     3) Rapid-acting insulin: aspart (Novolog) correction - see chart below. Take for high blood glucoses three times daily before meals when eating (or every 4-6 hours when receiving tube feeding) and at bedtime.  You may add the correction dose to the carbohydrate coverage/mealtime insulin dose and give in one injection--ideally 10-15 minutes before a meal.  You may take the correction dose even if you skip a meal (as long as it has been 4 hours since previous correction dose).      Blood Glucose Insulin Before Meals When Eating or every 4-6  hours when receiving tube feeding:   Less than 175 0 units   175 -224  1 units   225 - 274 2 units   275 - 324 3 units   325 - 374 4 units    375 - 424  5 units   425 - 474 6 units   475 or more 7 units         4) Intermediate-acting insulin: Novolin N (NPH). Take to cover tube feeds (dosed for [place] at 45 ml/hr)   - Take Novolin N (NPH) 4 units at 9AM   - Take Novolin N (NPH) 3 units at 9PM      (If your rate of tube feed were to decrease, you can reference the list below)  If TF rate at 20 ml per hour, give 1 units  If TF rate at 30 ml per hour, give 3 units  If TF rate from 40-45 ml/hr, give 5 units     Humulin N needs to be gently rilled/mixed before injecting.  Do not give the dose if the tube feeds will not run.  -All of her insulin instructions were reviewed with her by endocrinology and provided to her on a sheet  - continue PTA Creon      Acute on chronic pain  Patient reports severe pain in her abdomen and back. She reports that she has previously taken methadone for this, but no record of methadone later than 2010 could be located in her chart. CT A/P on admission unremarkable. Large amount of discrepancy regarding what her pain regimen was prior to admission, I.e. shared that she was taking methadone 20 mg 4x/day PRN for pain as well as oxycodone? National  for the past year does show prescriptions for methadone or oxycodone at all or consistently.   - pain team consulted, appreciate assistance (signed off 4/16)               - acetaminophen 975 mg PO q8h              - flexeril 5 mg PO TID PRN              - oxycodone 5 mg PO q4h PRN while inpatient while workup ongoing                           -Sent with a taper of oxycodone 5mg q4 for 2 days, then q6 for 2 days, then q8 for one day (total 16 pills)              - Started gabapentin 100 mg TID                - bowel regimen     Hypotension - improving   Hypotensive with SBPs 70s-80s, asymptomatic. Received 500 ml bolus with improvement. Likely  multifactorial including sedation from recent procedure, pain/sleep medication, and low rate of nutrition.   - continue to monitor      Malnutrition, severe  Cachexia   Patient reports > 30 lb weight loss in the last 6 months. Per endocrinology notes, this is likely multifactorial due to GI issues and uncontrolled diabetes, chronic abdominal pain. PEG-J exchanged on 4/17, tube feedings started.   - nutrition consulted, appreciate assistance   - continue PTA tube feeds      Hx of adrenal insufficiency   Followed by Dr. Maher. Previously suppressed AM cortisol and has been on empiric therapy for possible adrenal insufficiency, unclear if central vs transient. Most recent clinic note describes inability to wean steroids due to hypoglycemia episodes.   - continue PTA hydrocortisone      Concern for malignancy  Elevated CEA   Patient was seen and evaluated by PCP with a stat CTCAP to assess for malignancy on 1/23. This scan was negative. She was scheduled for follow up with her PCP but has yet to follow up with her PCP. She notes elevated CEA since she was in [place] but has not had a workup showing any cancer at this point however tells me that she is working with her PCP on next steps.   - continue follow-up with PCP        face to face documentation:  Patient has mobility limitation that significantly impairs his/her ability to participate in mobility-related activities of daily living (MRADL S) due to weight loss, abdominal pain, chronic pancreatitis, diabetes, and severe malnutrition and cachexia.  Mobility limitation cannot be sufficiently and safely resolved by use of a cane, walker or crutches due to severe deconditioning. Patient or caregiver are able to safely use the manual wheelchair Patient s functional mobility deficit can be sufficiently resolved by use of a manual wheelchair  Patient's home provides adequate access between rooms, maneuvering space and surfaces for use of the manual wheelchair * Patient has  not expressed an unwillingness to use the manual wheelchair that is provided in the home. This all can be written out together in a statement in your daily progress note, it just needs to somehow include all the above information.    Consultations This Hospital Stay   PHARMACY IP CONSULT  INTERVENTIONAL RADIOLOGY ADULT/PEDS IP CONSULT  PAIN MANAGEMENT ADULT IP CONSULT  ENDOCRINE DIABETES ADULT IP CONSULT  SURGERY GENERAL ADULT IP CONSULT  NUTRITION SERVICES ADULT IP CONSULT  CARE MANAGEMENT / SOCIAL WORK IP CONSULT  PHARMACY IP CONSULT  CARE MANAGEMENT / SOCIAL WORK IP CONSULT  GI LUMINAL ADULT IP CONSULT  PHYSICAL THERAPY ADULT IP CONSULT  OCCUPATIONAL THERAPY ADULT IP CONSULT  CARE MANAGEMENT / SOCIAL WORK IP CONSULT  PHARMACY LIAISON FOR MEDICATION COVERAGE CONSULT  NURSING TO CONSULT FOR VASCULAR ACCESS CARE IP CONSULT  PODIATRY IP CONSULT  NURSING TO CONSULT FOR VASCULAR ACCESS CARE IP CONSULT  NURSING TO CONSULT FOR VASCULAR ACCESS CARE IP CONSULT  NURSING TO CONSULT FOR VASCULAR ACCESS CARE IP CONSULT  VASCULAR ACCESS FOR PICC PLACEMENT ADULT IP CONSULT  CNS DIABETES IP CONSULT    Code Status   Full Code    Time Spent on this Encounter   I, Chang Cho MD, personally saw the patient today and spent greater than 30 minutes discharging this patient.       Chang Cho MD  [facility] [address] [place] [id number]  Phone: [phone]  ______________________________________________________________________    Physical Exam   Vital Signs: Temp: 97.9  F (36.6  C) Temp src: Oral BP: 96/57 Pulse: 91   Resp: 16 SpO2: 98 % O2 Device: None (Room air)    Weight: 111 lbs 1.6 oz  Exam  Gen: Awake and alert, thin, non-toxic appearing. Sitting on the side of her bed in no acute distress  CV: Regular rhythm and rate, extremities warm and well perfused, 2+ bilateral lower extremity edema  Pulm: Normal work of breathing, lungs clear to auscultation, no crackles or wheezing.  GI: Soft,  nontender, nondistended.  Skin: Warm, dry, no jaundice.  Neuro: Answering questions appropriately, speech normal, moves all extremities symmetrically.       Primary Care Physician   Ron Morgan    Discharge Orders      Primary Care - Care Coordination Referral      Pain Management  Referral      Primary Care Referral      Home Care Referral      Home Infusion Referral      Reason for your hospital stay    Dear Chantell Kidd,    Your were hospitalized at [facility] for a G-tube exchange which was performed.  Over your hospitalization your condition improved and today you are ready to be discharged.  You should continue to improve but if you develop fever, shortness of breath, or crushing chest pain then please seek medical attention.    We are suggesting the following medication changes:  -Please see your AVS for details    Please get the following tests done:  None      Please set up an appointment with:  Follow up with your Primary Care provider in the next two weeks.   Follow up with [facility] endocrinology as scheduled    It was a pleasure meeting with you today. Thank you for allowing me and my team the privilege of caring for you during your hospitalization. You are the reason we are here, and I truly hope we provided you with the excellent service you deserve. Please let us know if there is anything else we can do for you so that we can be sure you are leaving completely satisfied with your care experience.    If you have questions call your primary care physician.    Sincerely,    Chang Cho MD   Hospitalist  [facility]     Activity    Your activity upon discharge: activity as tolerated     Wheelchair Order for DME - ONLY FOR DME     Diet    Follow this diet upon discharge: Current Diet:Orders Placed This Encounter      Adult Formula Drip Feeding: Continuous [place] Peptide 1.5; Jejunostomy; Goal Rate: 45; mL/hr; see relizorb cartridge order       Regular Diet Adult    Free water order:  Free water route: Duodenum/Jejunum   Free water - Feeding Tube Flush Frequency: Q 4 Hours   Free water volume: 60 mL   Additional Free water: None   Comments:     Hospital Follow-up with Existing Primary Care Provider (PCP)            Significant Results and Procedures   Most Recent 3 CBC's:  Recent Labs   Lab Test 04/24/25  0700 04/23/25  0718 04/22/25  0730   WBC 6.7 6.4 6.9   HGB 9.3* 10.0* 10.2*   MCV 95 94 95    441 404     Most Recent 3 BMP's:  Recent Labs   Lab Test 04/24/25  1236 04/24/25  0857 04/24/25  0700 04/23/25  0926 04/23/25  0718 04/22/25  0814 04/22/25  0730   NA  --   --  139  --  140  --  139   POTASSIUM  --   --  4.6  --  4.2  --  4.1   CHLORIDE  --   --  104  --  103  --  103   CO2  --   --  27  --  26  --  27   BUN  --   --  20.2  --  20.4  --  20.1   CR  --   --  0.43*  --  0.46*  --  0.44*   ANIONGAP  --   --  8  --  11  --  9   ELIZA  --   --  8.4*  --  8.7*  --  8.8   * 262* 266*   < > 104*   < > 61*    < > = values in this interval not displayed.   ,   Results for orders placed or performed during the hospital encounter of 04/15/25   CT Abdomen Pelvis w Contrast    Narrative    EXAM: CT ABDOMEN PELVIS W CONTRAST  LOCATION: [facility]  DATE: 04/16/2025    INDICATION: Abdominal pain. Fevers.  COMPARISON: CT chest, abdomen, and pelvis with IV contrast 01/28/2025.  TECHNIQUE: CT scan of the abdomen and pelvis was performed following injection of IV contrast. Multiplanar reformats were obtained. Dose reduction techniques were used.  CONTRAST: 57 mL Isovue 370 IV.    FINDINGS:   LOWER CHEST: Lung bases are clear. No pleural effusion on either side. Normal cardiac size. No pericardial effusion.    HEPATOBILIARY: Cholecystectomy. Intrahepatic pneumobilia, stable. No discrete hepatic lesion. Patent hepatic and portal veins.    PANCREAS: Surgically absent.    SPLEEN: Surgically absent.    ADRENAL  GLANDS: Normal.    KIDNEYS/BLADDER: No urinary tract calculi. Both kidneys are well-perfused without hydronephrosis or hydroureter. Normal urinary bladder.    BOWEL: Resolved changes of pneumatosis adjacent to the hepatic flexure seen previously. Postoperative changes involving the stomach and the small bowel. Percutaneous gastrojejunostomy has been removed. Formed stool material within normal-caliber   redundant colon without mechanical obstruction, free gas or free fluid.    LYMPH NODES: No suspicious abdominopelvic adenopathy.    VASCULATURE: Slightly atherosclerotic normal-caliber distal abdominal aorta measuring 1.4 x 1.5 cm (image 41, series 902). Normal-caliber IVC.    PELVIC ORGANS: Hysterectomy. Stool-filled rectosigmoid. No adenopathy or free fluid.    MUSCULOSKELETAL: Diffuse body wall edema. Pars defects bilaterally at L5 resulting in low-grade anterolisthesis relative to S1.      Impression    IMPRESSION:   1.  Resolved changes of pneumatosis adjacent to the hepatic flexure seen previously. Postoperative changes involving the stomach and the small bowel. Percutaneous gastrojejunostomy has been removed. Formed stool material within normal-caliber redundant   colon without obstruction, free gas or free fluid.    2.  Cholecystectomy. Intrahepatic pneumobilia, stable.     3.  No urinary tract calculi or obstruction. Hysterectomy.    4.  Diffuse body wall edema. Pars defects bilaterally at L5 resulting in low-grade anterolisthesis relative to S1, unchanged.   XR Surgery NITHIN L/T 5 Min Fluoro w Stills    Narrative    This exam was marked as non-reportable because it will not be read by a   radiologist or a [facility] non-radiologist provider.         XR Abdomen Port 1 View    Narrative    EXAM: XR ABDOMEN PORT 1 VIEW  4/18/2025 2:26 PM     HISTORY:  Ensure correct placement of PEG-J       TECHNIQUE: Single frontal radiograph of the abdomen    COMPARISON:  CT abdomen 4/16/2025    FINDINGS:   AP portable supine  radiograph of the abdomen. Percutaneous  gastrojejunostomy tube projects over the left hemiabdomen. Scattered  surgical clips. Few dilated loops of bowel in the abdomen without  obstructive pattern. No pneumatosis or portal venous gas.      Impression    IMPRESSION:   Percutaneous gastrojejunostomy tube projects over the left hemiabdomen  of uncertain location. If exact location is desired, consider repeat  abdominal radiograph after injecting small amount of contrast.    I have personally reviewed the examination and initial interpretation  and I agree with the findings.    MILLY BECERRA DO         SYSTEM ID:  [id number]   XR Abdomen Port 1 View    Narrative    EXAM: XR ABDOMEN PORT 1 VIEW  4/18/2025 11:37 PM     HISTORY:  ongoing pain s/p G/J placement. hoping to verify placement.        TECHNIQUE: Single frontal radiograph of the abdomen    COMPARISON:  4/18/2025    FINDINGS:     Portable AP supine radiograph of the abdomen. Unchanged configuration  of percutaneous gastrojejunostomy tube projecting over the left  hemiabdomen. Moderate colonic stool burden. Gaseous distended loops of  bowel projecting over the left lower quadrant. Contrast opacification  of small bowel loops overlying the central abdomen and right upper  quadrant      Impression    IMPRESSION:    Percutaneous gastrojejunostomy tube tip projects over the left  hemiabdomen with contrast opacification of small bowel loops  suggesting tip placement in the small bowel. Similar coiled  configuration  of the tube in the left upper abdomen. Moderate to  large colonic stool burden.    I have personally reviewed the examination and initial interpretation  and I agree with the findings.    JAVIER ARORA MD         SYSTEM ID:  [id number]   XR Abdomen Port 1 View    Narrative    EXAM: XR ABDOMEN PORT 1 VIEW  4/22/2025 1:35 PM     HISTORY:  stool burdenen? PEG placement       TECHNIQUE: Single frontal radiograph of the abdomen    COMPARISON:   4/18/2025    FINDINGS:   AP portable supine radiograph of the abdomen. Percutaneous  gastrojejunostomy tube in similar position with tip projecting over  the left hemiabdomen. Moderate to severe stool burden. Extensive  postsurgical changes of the abdomen, better appreciated on CT  4/16/2025. Nonobstructive bowel gas pattern. No pneumatosis or portal  venous gas.      Impression    IMPRESSION:  1. Similar position of percutaneous gastrojejunostomy tube with tip  projecting over the left hemiabdomen, compared to radiograph  4/18/2025.  2. Moderate to severe stool burden.  3. Nonobstructive bowel gas pattern.    I have personally reviewed the examination and initial interpretation  and I agree with the findings.    PAULA ABDALLA MD         SYSTEM ID:  [id number]     *Note: Due to a large number of results and/or encounters for the requested time period, some results have not been displayed. A complete set of results can be found in Results Review.       Discharge Medications   Current Discharge Medication List        START taking these medications    Details   Alcohol Swabs PADS Use to swab the area of the injection or tiago as directed Per insurance coverage  Qty: 200 each, Refills: 1    Associated Diagnoses: Type 1 diabetes mellitus with hypoglycemia and without coma (H)      !! Continuous Glucose  (DEXCOM G7 ) RICARDO 1 each once for 1 dose. Use to read blood sugars as per manufacturers instructions  Qty: 1 each, Refills: 0    Associated Diagnoses: Type 1 diabetes mellitus with hypoglycemia and without coma (H)      !! Continuous Glucose Sensor (DEXCOM G7 SENSOR) MISC 1 each every 10 days. Change sensor every 10 days  Qty: 9 each, Refills: 5    Associated Diagnoses: Type 1 diabetes mellitus with hypoglycemia and without coma (H)      !! Continuous Glucose Sensor (DEXCOM G7 SENSOR) MISC Change every 10 days.  Qty: 3 each, Refills: 5    Associated Diagnoses: Post-pancreatectomy diabetes (H); Cell  transplant; Hypoglycemia; Exocrine pancreatic insufficiency      gabapentin (NEURONTIN) 100 MG capsule Take 1 capsule (100 mg) by mouth 3 times daily.  Qty: 90 capsule, Refills: 0    Associated Diagnoses: Generalized abdominal pain      insulin  UNIT/ML vial Inject 4 Units subcutaneously every morning AND 3 Units every evening.  Qty: 3 mL, Refills: 1    Associated Diagnoses: Type 1 diabetes mellitus with hypoglycemia and without coma (H)      oxyCODONE (ROXICODONE) 5 MG tablet Take 1 tablet (5 mg) by mouth every 4 hours as needed for pain for 2 days, THEN 1 tablet (5 mg) every 6 hours as needed for pain for 2 days, THEN 1 tablet (5 mg) every 8 hours as needed for pain.  Qty: 16 tablet, Refills: 0    Associated Diagnoses: Strain of abdominal muscle, initial encounter      Sharps Container MISC Use as directed to dispose of needles, lancets and other sharps  Qty: 1 each, Refills: 1    Associated Diagnoses: Type 1 diabetes mellitus with hypoglycemia and without coma (H)      sodium chloride, PF, 0.9% PF flush Inject 10-40 mLs into catheter every 8 hours. -- Flush J port and G port EACH with 30 ml water every 8 hours -- Flush J port with 30 ml water BEFORE AND AFTER medications to avoid clogging of this tube.  Qty: 40 mL, Refills: 0    Associated Diagnoses: Dislodged gastrostomy tube       !! - Potential duplicate medications found. Please discuss with provider.        CONTINUE these medications which have CHANGED    Details   cyclobenzaprine (FLEXERIL) 5 MG tablet May take 1 tablet (5 mg) by mouth 3 times daily as needed for muscle spasms. May also take 1 tablet (5 mg) every evening as needed for muscle spasms.  Qty: 90 tablet, Refills: 0    Associated Diagnoses: Acute bilateral low back pain with right-sided sciatica      !! insulin aspart (NOVOPEN ECHO) 100 UNIT/ML cartridge As  instructed per physician  Qty: 5 mL, Refills: 0    Associated Diagnoses: Post-pancreatectomy diabetes (H)      !! insulin aspart  (NOVOPEN ECHO) 100 UNIT/ML cartridge Use 1 unit per 14 grams carb  Qty: 30 mL, Refills: 5    Associated Diagnoses: Post-pancreatectomy diabetes (H); Exocrine pancreatic insufficiency; Type 1 diabetes mellitus with hypoglycemia and without coma (H)      insulin glargine (LANTUS PEN) 100 UNIT/ML pen Inject 3 Units subcutaneously every evening.  Qty: 15 mL, Refills: 0    Comments: If Lantus is not covered by insurance, may substitute Basaglar or Semglee or other insulin glargine product per insurance preference at same dose and frequency.    Associated Diagnoses: Type 1 diabetes mellitus with hypoglycemia and without coma (H)       !! - Potential duplicate medications found. Please discuss with provider.        CONTINUE these medications which have NOT CHANGED    Details   acetaminophen (TYLENOL) 325 MG tablet Take 3 tablets (975 mg) by mouth every 8 hours  Qty:        alendronate (FOSAMAX) 70 MG tablet Take 1 tablet (70 mg) by mouth every 7 days On Sundays take first thing in the morning with plain water and remain upright for at least 30 minutes and until after first food of the day    Do not restart Fosamax (alendronate) until your difficulty swallowing has resolved and you have finished the entire course of fluconazole (Diflucan).  Qty: 4 tablet, Refills: 0    Comments: Hold pending GI evaluation in 4-6 weeks  Associated Diagnoses: Age-related osteoporosis without current pathological fracture      alum & mag hydroxide-simethicone (MYLANTA/MAALOX) 200-200-25 MG CHEW chewable tablet Take 1 tablet by mouth 3 times daily as needed for indigestion      amylase-lipase-protease (CREON 12) 64295 units CPEP Take 6 to 8 capsules by mouth with meals and take 4 capsules with snacks. Needs up to 25 capsules per day  Qty: 750 capsule, Refills: 5    Associated Diagnoses: Exocrine pancreatic insufficiency      diclofenac (FLECTOR) 1.3 % Patch Place 1 patch onto the skin 2 times daily  Qty: 30 each, Refills: 1    Associated  Diagnoses: Generalized abdominal pain      diclofenac (VOLTAREN) 1 % GEL 2 g Apply 2 g to skin four times a day as needed (to affected upper extremity joint(s)). Maximum 8g/day per joint, 16g/day total.      dicyclomine (BENTYL) 10 MG capsule Take 10 mg by mouth every 6 hours      dronabinol (MARINOL) 2.5 MG capsule Take 2 capsules (5 mg) by mouth 2 times daily as needed  Qty: 56 capsule, Refills: 5    Associated Diagnoses: Routine health maintenance      !! DULoxetine (CYMBALTA) 30 MG capsule Take 1 capsule (30 mg) by mouth daily With 60mg capsule for total dose of 90mg  Qty: 90 capsule, Refills: 0    Associated Diagnoses: Major depressive disorder, recurrent episode, moderate (H); CIERRA (generalized anxiety disorder)      !! DULoxetine (CYMBALTA) 60 MG EC capsule Take 1 capsule (60 mg) by mouth daily With 30mg capsule for total dose of 90mg  Qty: 90 capsule, Refills: 1    Associated Diagnoses: Major depressive disorder, recurrent episode, moderate (H); CIERRA (generalized anxiety disorder)      famotidine (PEPCID) 20 MG tablet Take 1 tablet (20 mg) by mouth At Bedtime  Qty: 30 tablet, Refills: 0    Associated Diagnoses: Gastroesophageal reflux disease without esophagitis      Glucagon (GVOKE HYPOPEN 2-PACK) 1 MG/0.2ML pen Inject the contents of 1 device under the skin into lower abdomen, outer thigh, or outer upper arm as needed for hypoglycemia. If no response after 15 minutes, additional 1 mg dose from a new device may be injected while waiting for emergency assistance.  Qty: 0.4 mL, Refills: 0    Associated Diagnoses: Type 1 diabetes mellitus with hypoglycemia and without coma (H)      !! hydrocortisone (CORTEF) 10 MG tablet Take 10 mg in the morning and 10 mg along with a 5 mg tablet at bedtime for a total dose of 15 mg at bedtime. Watch for hypoglycemia recurrence.      !! hydrocortisone (CORTEF) 5 MG tablet Take 5 mg by mouth At Bedtime along with a 10 mg tablet for a total dose of 15 mg      hydrocortisone  sodium succinate PF (SOLU-CORTEF) 100 MG injection Inject 100mg (1 mL) into muscle in emergency or unable to take oral hydrocortisone. Go to emergency room if given. ActoVial NDC [address]-67. Note to pharmacy: Provide two 3ml syringes with 23g 1 inch needles.  Qty: 1 mL, Refills: 0    Associated Diagnoses: Adrenal insufficiency      levothyroxine (SYNTHROID/LEVOTHROID) 125 MCG tablet Take 125 mcg by mouth every morning (before breakfast).      linaclotide (LINZESS) 145 MCG capsule Take 145 mcg by mouth every morning (before breakfast) as needed      metoclopramide (REGLAN) 5 MG tablet Take 2 tablets (10 mg) by mouth 3 times daily  Qty: 180 tablet, Refills: 3    Associated Diagnoses: Nausea      Nutritional Supplements (BOOST HIGH PROTEIN) LIQD After above baseline labs are drawn, give: 6 mL/kg to maximum of 360 mL; the beverage is to be consumed within 5 minutes.  Refills: 0    Comments: If on pancreatic enzymes, patient may take home enzymes with Boost beverage.      Patients may take long acting insulin (Levemir and Lantus).      Patient should NOT cover Boost with Novolog, Humalog, Apidra, or regular insulin.  Associated Diagnoses: Post-pancreatectomy diabetes (H); Pancreatic insufficiency      ondansetron (ZOFRAN) 4 MG tablet Take 1 tablet (4 mg) by mouth every 8 hours as needed for nausea  Qty: 30 tablet, Refills: 0    Associated Diagnoses: Nausea      pantoprazole (PROTONIX) 40 MG EC tablet Take 1 tablet (40 mg) by mouth 2 times daily  Qty: 180 tablet, Refills: 3    Associated Diagnoses: H. pylori infection      polyethylene glycol (MIRALAX/GLYCOLAX) packet Take 1 packet by mouth 2 times daily.  Qty: 14 each, Refills: 5    Associated Diagnoses: Chronic constipation      potassium chloride ER (KLOR-CON M) 20 MEQ CR tablet Take 1 tablet (20 mEq) by mouth daily  Qty: 90 tablet, Refills: 1    Comments: Hold until pcp follow-up  Associated Diagnoses: Hypokalemia      senna-docusate (SENOKOT-S/PERICOLACE) 8.6-50  MG tablet Take 1-2 tablets by mouth daily as needed for constipation  Qty: 60 tablet, Refills: 3    Associated Diagnoses: Constipation      sodium chloride (OCEAN) 0.65 % nasal spray Spray 1 spray into both nostrils daily as needed for congestion  Qty: 30 mL, Refills: 0    Associated Diagnoses: Irritation of nose      sucralfate (CARAFATE) 1 GM tablet Take 1 tablet (1 g) by mouth 4 times daily (before meals and nightly)  Qty: 30 tablet, Refills: 0    Associated Diagnoses: Nausea      SUMAtriptan (IMITREX) 50 MG tablet Take 1 tablet (50 mg) by mouth at onset of headache for migraine Take 1 Tab by mouth Once as needed for Migraine Headache. May repeat after two hours.  Maximum dose 200 mg/24 hours.  Qty: 30 tablet, Refills: 1    Associated Diagnoses: Migraine      topiramate (TOPAMAX) 100 MG tablet Take 1 tablet (100 mg) by mouth 2 times daily  Qty: 180 tablet, Refills: 1    Associated Diagnoses: Migraine, unspecified, not intractable, without status migrainosus      traZODone (DESYREL) 100 MG tablet TAKE 1-2 TABLETS BY MOUTH AN HOUR BEFORE BEDTIME  Qty: 100 tablet, Refills: 2    Associated Diagnoses: Primary insomnia; Constipation, unspecified constipation type; Chronic back pain, unspecified back location, unspecified back pain laterality; Physical deconditioning      Acetone, Urine, Test (KETONE TEST) STRP 1 each by In Vitro route as needed (hyperglycemia)  Qty: 50 strip, Refills: 0    Associated Diagnoses: Type 1 diabetes mellitus with hypoglycemia and without coma (H)      !! Continuous Blood Gluc  (DEXCOM G6 ) RICARDO Use to read blood sugars as per 's instructions.  Qty: 1 each, Refills: 0    Associated Diagnoses: Post-pancreatectomy diabetes (H)      !! Continuous Blood Gluc Sensor (DEXCOM G6 SENSOR) MISC Change every 10 days.  Qty: 9 each, Refills: 3    Associated Diagnoses: Post-pancreatectomy diabetes (H)      Continuous Blood Gluc Transmit (DEXCOM G6 TRANSMITTER) MISC Change every  3 months.  Qty: 1 each, Refills: 3    Associated Diagnoses: Post-pancreatectomy diabetes (H)      !! Continuous Glucose  (DEXCOM G7 ) RICARDO Use to read blood sugars as per 's instructions.  Qty: 1 each, Refills: 0    Associated Diagnoses: Post-pancreatectomy diabetes (H)      !! Continuous Glucose Sensor (DEXCOM G7 SENSOR) MISC Change every 10 days.  Qty: 3 each, Refills: 5    Associated Diagnoses: Post-pancreatectomy diabetes (H)      CONTOUR NEXT TEST test strip USE TO TEST BLOOD SUGAR 6 TIMES DAILY OR AS DIRECTED. Call clinic to schedule follow up appointment.  IMPORTANT  Qty: 600 strip, Refills: 1    Associated Diagnoses: Post-pancreatectomy diabetes (H)      Injection Device for insulin (NOVOPEN ECHO) RICARDO Use with   Insulin  Qty: 1 each, Refills: 0    Associated Diagnoses: Post-pancreatectomy diabetes (H)      Insulin Disposable Pump (OMNIPOD 5 G6 INTRO, GEN 5,) KIT 1 each See Admin Instructions Use continuously to infuse insulin.  Qty: 1 kit, Refills: 0    Associated Diagnoses: Post-pancreatectomy diabetes (H)      insulin pen needle (PIP PEN NEEDLES 32G X 4MM) 32G X 4 MM miscellaneous Use 6 pen needles daily or as directed.  Qty: 200 each, Refills: 5    Associated Diagnoses: Post-pancreatectomy diabetes (H)      order for DME by [employer] [address] [place], [place]  Ph: [phone]  Fax: [phone]    Nutren 1.5 @ 10 ml/hr with advancement by 10 ml/hr q 8 hours to goal rate of 40 ml/hr.  This will provide 1440 kcals (27 kcal/kg/day), 65 g PRO (1.2 g/kg/day), 730 mL H2O, 169 g CHO and no Fiber daily.     Qty: 1 Month, Refills: 3    Associated Diagnoses: Hypoglycemia; Nausea; Decreased oral intake; Exocrine pancreatic insufficiency; Type 1 diabetes mellitus with hypoglycemia and without coma (H); Generalized abdominal pain; Post-pancreatectomy diabetes (H); Cell transplant; On enteral nutrition       !! - Potential duplicate medications found. Please  discuss with provider.        STOP taking these medications       insulin aspart (NOVOLOG VIAL) 100 UNITS/ML vial Comments:   Reason for Stopping:         ondansetron (ZOFRAN-ODT) 4 MG ODT tab Comments:   Reason for Stopping:         traMADol (ULTRAM) 50 MG tablet Comments:   Reason for Stopping:             Allergies   Allergies   Allergen Reactions    Corticosteroids Other (See Comments)     All oral, IV and injectable steroids are contraindicated (unless in life threatening situations) in [employer] [employer] transplant recipients. They can cause irreversible loss of islet cell function. Please contact patient's transplant care coordinator YURI Cortez RN at [phone]/pager [phone] and/or endocrinologist prior to administration.      Chocolate Flavoring Agent (Non-Screening) Rash     Breaks out when eats chocolate

## 2025-04-25 ENCOUNTER — PATIENT OUTREACH (OUTPATIENT)
Dept: CARE COORDINATION | Facility: CLINIC | Age: 60
End: 2025-04-25
Payer: MEDICARE

## 2025-04-25 ENCOUNTER — TELEPHONE (OUTPATIENT)
Dept: INTERNAL MEDICINE | Facility: CLINIC | Age: 60
End: 2025-04-25
Payer: MEDICARE

## 2025-04-25 PROCEDURE — S9342 HIT ENTERAL PUMP DIEM: HCPCS

## 2025-04-25 PROCEDURE — B4035 ENTERAL FEED SUPP PUMP PER D: HCPCS

## 2025-04-25 NOTE — PROGRESS NOTES
Clinic Care Coordination Contact    Situation: Patient chart reviewed by care coordinator.    Background: Referred by Inpatient Care Team for Care Transition related to Inpatient to Outpatient on 4/19/25- pt discharged on 4/24.     Previous CC referral:   Referred by Dr. Merlos for Complex Medical Concerns/Education related to Chronic Diagnosis (type 1 diabetes) on 4/17/25.     We placed a coordination care referral and notified our clinic RN Jose for close follow up and hopefully will be able to facilitate immediate clinic visit post admission, although we have not been able to reach out to her in the past few months despite our best efforts. Pt remains at high risk of readmission due to non compliance and lack of follow up.     Assessment: Per chart review, pt has called triage today and was recommended to go to ED.     Plan/Recommendations: RN will call patient to ensure pt is going to ED.     MARCY LOVING RN on 4/29/2025 at 2:10 PM

## 2025-04-25 NOTE — PLAN OF CARE
Occupational Therapy Discharge Summary    Reason for therapy discharge:    Discharged to home.    Progress towards therapy goal(s). See goals on Care Plan in [facility] electronic health record for goal details.  Goals partially met.  Barriers to achieving goals:   discharge from facility.    Therapy recommendation(s):    Continued therapy is recommended.  Rationale/Recommendations:  home care recommended. Pt not seen by discharging therapist this date.

## 2025-04-25 NOTE — PLAN OF CARE
Physical Therapy Discharge Summary    Reason for therapy discharge:    Discharged to home with home therapy.    Progress towards therapy goal(s). See goals on Care Plan in [facility] electronic health record for goal details.  Goals partially met.  Barriers to achieving goals:   discharge from facility.    Therapy recommendation(s):    Continued therapy is recommended.  Rationale/Recommendations:  recommend continued  PT to progress pt's home safety and IND with mobility while weaning AD as appropriate.

## 2025-04-25 NOTE — TELEPHONE ENCOUNTER
[facility] Call Center    Phone Message    May a detailed message be left on voicemail: yes     Reason for Call: Order(s): Home Care Orders: Other: delay start for home care orders until 04/ 27/2025 per patient's request. Please call and advise ASAP

## 2025-04-26 PROCEDURE — B4035 ENTERAL FEED SUPP PUMP PER D: HCPCS

## 2025-04-26 PROCEDURE — S9342 HIT ENTERAL PUMP DIEM: HCPCS

## 2025-04-27 ENCOUNTER — MEDICAL CORRESPONDENCE (OUTPATIENT)
Dept: HEALTH INFORMATION MANAGEMENT | Facility: CLINIC | Age: 60
End: 2025-04-27
Payer: MEDICARE

## 2025-04-27 PROCEDURE — S9342 HIT ENTERAL PUMP DIEM: HCPCS

## 2025-04-27 PROCEDURE — B4035 ENTERAL FEED SUPP PUMP PER D: HCPCS

## 2025-04-28 ENCOUNTER — HOME INFUSION BILLING (OUTPATIENT)
Dept: HOME HEALTH SERVICES | Facility: HOME HEALTH | Age: 60
End: 2025-04-28
Payer: MEDICARE

## 2025-04-28 ENCOUNTER — PATIENT OUTREACH (OUTPATIENT)
Dept: CARE COORDINATION | Facility: CLINIC | Age: 60
End: 2025-04-28
Payer: MEDICARE

## 2025-04-28 ENCOUNTER — TELEPHONE (OUTPATIENT)
Dept: INTERNAL MEDICINE | Facility: CLINIC | Age: 60
End: 2025-04-28
Payer: MEDICARE

## 2025-04-28 PROCEDURE — S9342 HIT ENTERAL PUMP DIEM: HCPCS

## 2025-04-28 PROCEDURE — B4035 ENTERAL FEED SUPP PUMP PER D: HCPCS

## 2025-04-28 NOTE — TELEPHONE ENCOUNTER
[facility] Call Center    Phone Message    May a detailed message be left on voicemail: yes     Reason for Call: Order(s): Other:   Reason for requested: Verbal orders request - Skilled Nursing / OT / PT    Skilled Nursing - 2X wk for 2 wks, 1X wk for 3 wks, 1X eo wk for 4 wks  OT/PT - Eval and Treat    Drug interaction found should patient continue with medication as Rx'd  (Duloxetine & Reglan)    Patient was to have drain bag connected to her J-tube.  Patient ran out of bags and [facility] will not supply addition until 4/29 or 4/30    Date needed: asap  Provider name: Eddie    Action Taken: Other: pcc    Travel Screening: Not Applicable     Date of Service:

## 2025-04-28 NOTE — TELEPHONE ENCOUNTER
LOV 04/17/2025      Called Danica. Gave verbal orders per Dr. Morgan for Order(s): Home Care Orders: delay start for home care orders until 04/ 27/2025 per patient's request.       Kieran Leon CMA ([facility]) at 10:30 AM on 4/28/2025

## 2025-04-29 ENCOUNTER — HOME CARE VISIT (OUTPATIENT)
Dept: HOME HEALTH SERVICES | Facility: HOME HEALTH | Age: 60
End: 2025-04-29
Payer: MEDICARE

## 2025-04-29 ENCOUNTER — HOME INFUSION BILLING (OUTPATIENT)
Dept: HOME HEALTH SERVICES | Facility: HOME HEALTH | Age: 60
End: 2025-04-29
Payer: MEDICARE

## 2025-04-29 ENCOUNTER — NURSE TRIAGE (OUTPATIENT)
Dept: NURSING | Facility: CLINIC | Age: 60
End: 2025-04-29

## 2025-04-29 ENCOUNTER — TELEPHONE (OUTPATIENT)
Dept: INTERNAL MEDICINE | Facility: CLINIC | Age: 60
End: 2025-04-29
Payer: MEDICARE

## 2025-04-29 ENCOUNTER — TELEPHONE (OUTPATIENT)
Dept: PHARMACY | Facility: OTHER | Age: 60
End: 2025-04-29
Payer: MEDICARE

## 2025-04-29 VITALS
OXYGEN SATURATION: 97 % | TEMPERATURE: 98.9 F | HEART RATE: 81 BPM | RESPIRATION RATE: 16 BRPM | DIASTOLIC BLOOD PRESSURE: 84 MMHG | BODY MASS INDEX: 19.02 KG/M2 | WEIGHT: 104 LBS | SYSTOLIC BLOOD PRESSURE: 140 MMHG

## 2025-04-29 PROCEDURE — S9342 HIT ENTERAL PUMP DIEM: HCPCS

## 2025-04-29 PROCEDURE — B4035 ENTERAL FEED SUPP PUMP PER D: HCPCS

## 2025-04-29 NOTE — PROGRESS NOTES
Clinic Care Coordination Contact  [facility]/Voicemail    Clinical Data: Care Coordinator Outreach    Outreach Documentation Number of Outreach Attempt   4/29/2025   2:11 PM 1       Left message on patient's voicemail with call back information and requested return call.      Plan: Care Coordinator will try to reach patient again in 1-2 business days. RN will follow up tomorrow to ensure patient has gone to ED.    MARCY LOVING RN on 4/29/2025 at 2:49 PM    Addendum:  Pt is inpatient. Inpatient hand in completed. RN will outreach to patient at discharge.     MARCY LOVING RN on 4/30/2025 at 10:37 AM

## 2025-04-29 NOTE — TELEPHONE ENCOUNTER
MTM referral from: Transitions of Care (recent hospital discharge, TCU discharge, or ED visit)    MTM referral outreach attempt #2 on April 29, 2025 at 2:29 PM      Outcome: Left Message    Use [facility], [facility], LARISSA discharged 4/24 for the carrier/Plan on the flowsheet      [facility] Sent    Janeth Pena [facility]  [place] [place]  [phone]

## 2025-04-29 NOTE — PROGRESS NOTES
Nursing Visit Note:  Nurse visit today for Enteral Care Coordination visit  for Chantell Kidd.     present during visit today: Not Applicable.    Intravenous Access:  No IV access .    Education Provided:     Patient Education focused on GJ -tube site care. Enteral feedings.     Note: Alert and oriented x3. VSS. Oral fluid intake good. Drinking 3-4 32 0z cups of water daily. Able to only eat sm amounts of soup or noodles with sauce. Sm Emesis 4-6 x/day, usually following attempts to eat. Zofran somewhat effective in reducing nausea. Loose BM today. Bowel sounds positive x 4 quads. Enteral feeding running at 45ml/hr. Taking additional water per GJ-tube as ordered. LS CTA. Pt independent with use of Infinity Enteralife pump. No wounds. GJ- tube exit site with moderate renders where skin folds. Encouraged Pt to utilize Lotrimin cream at site and position to allow airflow.  Requires walker and assist of 1 for mobility. +3 pitting edema present in ankles and feet. Neuropathy present in hands and feet. Chronic headache with some relief with Tyl. Pt expecting initial Pt and OT visit today. Pt has referral for pain clinic and is planning to call today to set up appointment.     Saline administered: N/A (ml)    Supply Check:   Does the patient have all the supplies they need for the next visit?  No, Order placed for Confirmed with [facility] delivery of 4x4 gauge, 12 ml enfit syringes and gastric drainage bags by 1 pm today.    Next visit plan: PRN SN visit to troubleshoot enteral complications     Angel Molina RN 4/29/2025

## 2025-04-29 NOTE — TELEPHONE ENCOUNTER
[facility] Call Center    Phone Message    May a detailed message be left on voicemail: yes     Reason for Call: Symptoms or Concerns     If patient has red-flag symptoms, warm transfer to triage line    Current symptom or concern: swollen legs/water blisters/painful legs    Symptoms have been present for:  1 day(s)    Has patient previously been seen for this? No      Are there any new or worsening symptoms? Yes: painful can barely walk    Action Taken: Patient transferred to: triage    Travel Screening: Not Applicable     Date of Service:

## 2025-04-29 NOTE — TELEPHONE ENCOUNTER
Duplicate encounter, upon chart review patient was advised to go to ED.  Patient acknowledged understanding.     Adrianna Stephens RN on 4/29/2025 at 11:31 AM

## 2025-04-29 NOTE — TELEPHONE ENCOUNTER
Nurse Triage SBAR    Is this a 2nd Level Triage? YES, LICENSED PRACTITIONER REVIEW IS REQUIRED    Situation:  Pt calling in with 10/10 pain and leg swelling     Background:  Pt recently discharged from 4/15/2025 - 4/24/2025 (9 days) [facility]  for PEG tube placement and uncontrolled DM1. Since being home, Pt reports an increase in bilateral leg swelling and left hand with pain 10/10 and fever that started on Sunday     Meds: lasix 40mg TID, oxycodone, tylenol     Assessment:   Chief complaint: Leg swelling, pain 10/10  -Swelling started Friday afternoon  -swelling up to knee, left hand  -Difficulty walking/bending joints with swelling present  -Fever 101.5-102 since Sunday  -Increased weakness, dizziness, balance  -Pt reports had to take oxycodone for pain and is currently out    Protocol Recommended Disposition:   Go to ED Now    Recommendation: Pt advised to go to ER for evaluation. Pt told that care team would be notified to follow up as well. Pt agrees with recommendation and will go to [place] ER now. Pt told to callback with questions.     Routed to provider    CARLOS Rose  P Central [facility]/Red Flag Triage & Med Refill Team         Reason for Disposition   [1] Can't walk or can barely walk AND [2] new-onset    Additional Information   Negative: SEVERE difficulty breathing (e.g., struggling for each breath, speaks in single words)   Negative: Looks like a broken bone or dislocated joint (e.g., crooked or deformed)   Negative: Sounds like a life-threatening emergency to the triager   Negative: Chest pain   Negative: Followed a leg injury   Negative: [1] Followed an insect bite AND [2] localized swelling (e.g., small area of puffy or swollen skin)   Negative: Swelling of one ankle joint   Negative: Swelling of knee is main symptom   Negative: Pregnant   Negative: Postpartum (from 0 to 6 weeks after delivery)   Negative: [1] Heart failure suspected (e.g., ankle  "swelling, shortness of breath, weight gain) AND [2] recently in hospital for heart failure   Negative: Difficulty breathing at rest   Negative: Entire foot is cool or blue in comparison to other side    Answer Assessment - Initial Assessment Questions  1. ONSET: \"When did the swelling start?\" (e.g., minutes, hours, days)  Friday afternoon  2. LOCATION: \"What part of the leg is swollen?\"  \"Are both legs swollen or just one leg?\"      Bilateral legs, more son in left leg  3. SEVERITY: \"How bad is the swelling?\" (e.g., localized; mild, moderate, severe)    - SEVERE edema: Swelling extends above knee, facial or hand swelling present.       4. REDNESS: \"Does the swelling look red or infected?\"      Pt reports no redness in legs, \"just normal skin color\"  5. PAIN: \"Is the swelling painful to touch?\" If Yes, ask: \"How painful is it?\"   (Scale 1-10; mild, moderate or severe)      10/10 pain, pt states, had to use oxycodone for pain, it currently out and pain uncontrolled - unable to walk due to pain and inability to bend ankles, knee  6. FEVER: \"Do you have a fever?\" If Yes, ask: \"What is it, how was it measured, and when did it start?\"       Yes, Pt reports fever of 101.5-102 since Sunday   7. CAUSE: \"What do you think is causing the leg swelling?\"      Pt unsure of cause   8. MEDICAL HISTORY: \"Do you have a history of blood clots (e.g., DVT), cancer, heart failure, kidney disease, or liver failure?\"      Pt reports is being monitored for \"fatty liver\" and CEA levels elevated   9. RECURRENT SYMPTOM: \"Have you had leg swelling before?\" If Yes, ask: \"When was the last time?\" \"What happened that time?\"      Yes, January 2025  10. OTHER SYMPTOMS: \"Do you have any other symptoms?\" (e.g., chest pain, difficulty breathing)        Pt reports weakness, SOB with activity, dizziness, off balance   11. PREGNANCY: \"Is there any chance you are pregnant?\" \"When was your last menstrual period?\"        NA    Protocols used: Leg Swelling " and Edema-A-AH

## 2025-04-30 ENCOUNTER — APPOINTMENT (OUTPATIENT)
Dept: CT IMAGING | Facility: CLINIC | Age: 60
DRG: 637 | End: 2025-04-30
Attending: EMERGENCY MEDICINE
Payer: MEDICARE

## 2025-04-30 ENCOUNTER — APPOINTMENT (OUTPATIENT)
Dept: ULTRASOUND IMAGING | Facility: CLINIC | Age: 60
End: 2025-04-30
Attending: EMERGENCY MEDICINE
Payer: MEDICARE

## 2025-04-30 ENCOUNTER — HOSPITAL ENCOUNTER (INPATIENT)
Facility: CLINIC | Age: 60
End: 2025-04-30
Attending: EMERGENCY MEDICINE | Admitting: STUDENT IN AN ORGANIZED HEALTH CARE EDUCATION/TRAINING PROGRAM
Payer: MEDICARE

## 2025-04-30 ENCOUNTER — APPOINTMENT (OUTPATIENT)
Dept: CARDIOLOGY | Facility: CLINIC | Age: 60
End: 2025-04-30
Payer: MEDICARE

## 2025-04-30 ENCOUNTER — HOME INFUSION BILLING (OUTPATIENT)
Dept: HOME HEALTH SERVICES | Facility: HOME HEALTH | Age: 60
End: 2025-04-30
Payer: MEDICARE

## 2025-04-30 DIAGNOSIS — K86.81 EXOCRINE PANCREATIC INSUFFICIENCY: ICD-10-CM

## 2025-04-30 DIAGNOSIS — R73.9 HYPERGLYCEMIA: ICD-10-CM

## 2025-04-30 DIAGNOSIS — E10.649 TYPE 1 DIABETES MELLITUS WITH HYPOGLYCEMIA AND WITHOUT COMA (H): ICD-10-CM

## 2025-04-30 DIAGNOSIS — R60.0 PERIPHERAL EDEMA: ICD-10-CM

## 2025-04-30 DIAGNOSIS — Z46.59 ENCOUNTER FOR FEEDING TUBE PLACEMENT: Primary | ICD-10-CM

## 2025-04-30 DIAGNOSIS — K21.9 GASTROESOPHAGEAL REFLUX DISEASE WITHOUT ESOPHAGITIS: ICD-10-CM

## 2025-04-30 DIAGNOSIS — R07.9 CHEST PAIN, UNSPECIFIED TYPE: ICD-10-CM

## 2025-04-30 DIAGNOSIS — R11.0 NAUSEA: ICD-10-CM

## 2025-04-30 DIAGNOSIS — R10.11 RUQ ABDOMINAL PAIN: ICD-10-CM

## 2025-04-30 DIAGNOSIS — R10.84 GENERALIZED ABDOMINAL PAIN: ICD-10-CM

## 2025-04-30 LAB
ALBUMIN SERPL BCG-MCNC: 3.8 G/DL (ref 3.5–5.2)
ALBUMIN UR-MCNC: NEGATIVE MG/DL
ALP SERPL-CCNC: 155 U/L (ref 40–150)
ALT SERPL W P-5'-P-CCNC: 125 U/L (ref 0–50)
ANION GAP SERPL CALCULATED.3IONS-SCNC: 15 MMOL/L (ref 7–15)
APPEARANCE UR: CLEAR
AST SERPL W P-5'-P-CCNC: 194 U/L (ref 0–45)
ATRIAL RATE - MUSE: 75 BPM
ATRIAL RATE - MUSE: 79 BPM
BASOPHILS # BLD AUTO: 0.1 10E3/UL (ref 0–0.2)
BASOPHILS NFR BLD AUTO: 1 %
BILIRUB SERPL-MCNC: 0.3 MG/DL
BILIRUB UR QL STRIP: NEGATIVE
BUN SERPL-MCNC: 6.3 MG/DL (ref 8–23)
CALCIUM SERPL-MCNC: 9.1 MG/DL (ref 8.8–10.4)
CHLORIDE SERPL-SCNC: 91 MMOL/L (ref 98–107)
COLOR UR AUTO: ABNORMAL
CREAT SERPL-MCNC: 0.38 MG/DL (ref 0.51–0.95)
CRP SERPL-MCNC: <3 MG/L
DIASTOLIC BLOOD PRESSURE - MUSE: NORMAL MMHG
DIASTOLIC BLOOD PRESSURE - MUSE: NORMAL MMHG
EGFRCR SERPLBLD CKD-EPI 2021: >90 ML/MIN/1.73M2
EOSINOPHIL # BLD AUTO: 0.1 10E3/UL (ref 0–0.7)
EOSINOPHIL NFR BLD AUTO: 2 %
ERYTHROCYTE [DISTWIDTH] IN BLOOD BY AUTOMATED COUNT: 13.1 % (ref 10–15)
EST. AVERAGE GLUCOSE BLD GHB EST-MCNC: 407 MG/DL
ETHANOL SERPL-MCNC: <0.01 G/DL
GLUCOSE BLDC GLUCOMTR-MCNC: 106 MG/DL (ref 70–99)
GLUCOSE BLDC GLUCOMTR-MCNC: 129 MG/DL (ref 70–99)
GLUCOSE BLDC GLUCOMTR-MCNC: 169 MG/DL (ref 70–99)
GLUCOSE BLDC GLUCOMTR-MCNC: 238 MG/DL (ref 70–99)
GLUCOSE BLDC GLUCOMTR-MCNC: 393 MG/DL (ref 70–99)
GLUCOSE BLDC GLUCOMTR-MCNC: 483 MG/DL (ref 70–99)
GLUCOSE BLDC GLUCOMTR-MCNC: 79 MG/DL (ref 70–99)
GLUCOSE BLDC GLUCOMTR-MCNC: 83 MG/DL (ref 70–99)
GLUCOSE BLDC GLUCOMTR-MCNC: >600 MG/DL (ref 70–99)
GLUCOSE SERPL-MCNC: 747 MG/DL (ref 70–99)
GLUCOSE UR STRIP-MCNC: >=1000 MG/DL
HBA1C MFR BLD: 15.8 %
HCO3 SERPL-SCNC: 25 MMOL/L (ref 22–29)
HCT VFR BLD AUTO: 30.7 % (ref 35–47)
HGB BLD-MCNC: 10.5 G/DL (ref 11.7–15.7)
HGB UR QL STRIP: NEGATIVE
IMM GRANULOCYTES # BLD: 0 10E3/UL
IMM GRANULOCYTES NFR BLD: 0 %
INTERPRETATION ECG - MUSE: NORMAL
INTERPRETATION ECG - MUSE: NORMAL
KETONES UR STRIP-MCNC: NEGATIVE MG/DL
LEUKOCYTE ESTERASE UR QL STRIP: NEGATIVE
LIPASE SERPL-CCNC: 7 U/L (ref 13–60)
LVEF ECHO: NORMAL
LYMPHOCYTES # BLD AUTO: 1.4 10E3/UL (ref 0.8–5.3)
LYMPHOCYTES NFR BLD AUTO: 24 %
MAGNESIUM SERPL-MCNC: 1.9 MG/DL (ref 1.7–2.3)
MCH RBC QN AUTO: 31.3 PG (ref 26.5–33)
MCHC RBC AUTO-ENTMCNC: 34.2 G/DL (ref 31.5–36.5)
MCV RBC AUTO: 91 FL (ref 78–100)
MONOCYTES # BLD AUTO: 0.5 10E3/UL (ref 0–1.3)
MONOCYTES NFR BLD AUTO: 9 %
NEUTROPHILS # BLD AUTO: 3.8 10E3/UL (ref 1.6–8.3)
NEUTROPHILS NFR BLD AUTO: 64 %
NITRATE UR QL: NEGATIVE
NRBC # BLD AUTO: 0 10E3/UL
NRBC BLD AUTO-RTO: 0 /100
NT-PROBNP SERPL-MCNC: 205 PG/ML (ref 0–900)
P AXIS - MUSE: 65 DEGREES
P AXIS - MUSE: 75 DEGREES
PH UR STRIP: 7 [PH] (ref 5–7)
PLATELET # BLD AUTO: 574 10E3/UL (ref 150–450)
POTASSIUM SERPL-SCNC: 4.2 MMOL/L (ref 3.4–5.3)
PR INTERVAL - MUSE: 152 MS
PR INTERVAL - MUSE: 162 MS
PROT SERPL-MCNC: 6.3 G/DL (ref 6.4–8.3)
QRS DURATION - MUSE: 72 MS
QRS DURATION - MUSE: 72 MS
QT - MUSE: 386 MS
QT - MUSE: 386 MS
QTC - MUSE: 431 MS
QTC - MUSE: 442 MS
R AXIS - MUSE: 48 DEGREES
R AXIS - MUSE: 64 DEGREES
RBC # BLD AUTO: 3.36 10E6/UL (ref 3.8–5.2)
RBC URINE: 1 /HPF
SODIUM SERPL-SCNC: 131 MMOL/L (ref 135–145)
SP GR UR STRIP: 1.03 (ref 1–1.03)
SQUAMOUS EPITHELIAL: <1 /HPF
SYSTOLIC BLOOD PRESSURE - MUSE: NORMAL MMHG
SYSTOLIC BLOOD PRESSURE - MUSE: NORMAL MMHG
T AXIS - MUSE: 57 DEGREES
T AXIS - MUSE: 80 DEGREES
TROPONIN T SERPL HS-MCNC: <6 NG/L
UROBILINOGEN UR STRIP-MCNC: NORMAL MG/DL
VENTRICULAR RATE- MUSE: 75 BPM
VENTRICULAR RATE- MUSE: 79 BPM
WBC # BLD AUTO: 5.9 10E3/UL (ref 4–11)
WBC URINE: 2 /HPF

## 2025-04-30 PROCEDURE — 999N000285 HC STATISTIC VASC ACCESS LAB DRAW WITH PIV START

## 2025-04-30 PROCEDURE — 74177 CT ABD & PELVIS W/CONTRAST: CPT | Mod: 26 | Performed by: RADIOLOGY

## 2025-04-30 PROCEDURE — 250N000011 HC RX IP 250 OP 636: Performed by: INTERNAL MEDICINE

## 2025-04-30 PROCEDURE — 83880 ASSAY OF NATRIURETIC PEPTIDE: CPT | Performed by: EMERGENCY MEDICINE

## 2025-04-30 PROCEDURE — 93306 TTE W/DOPPLER COMPLETE: CPT | Mod: 26 | Performed by: INTERNAL MEDICINE

## 2025-04-30 PROCEDURE — 36415 COLL VENOUS BLD VENIPUNCTURE: CPT | Performed by: EMERGENCY MEDICINE

## 2025-04-30 PROCEDURE — 85025 COMPLETE CBC W/AUTO DIFF WBC: CPT | Performed by: EMERGENCY MEDICINE

## 2025-04-30 PROCEDURE — 99285 EMERGENCY DEPT VISIT HI MDM: CPT | Performed by: EMERGENCY MEDICINE

## 2025-04-30 PROCEDURE — 93005 ELECTROCARDIOGRAM TRACING: CPT | Performed by: EMERGENCY MEDICINE

## 2025-04-30 PROCEDURE — 83735 ASSAY OF MAGNESIUM: CPT | Performed by: EMERGENCY MEDICINE

## 2025-04-30 PROCEDURE — 250N000012 HC RX MED GY IP 250 OP 636 PS 637: Performed by: NURSE PRACTITIONER

## 2025-04-30 PROCEDURE — 999N000248 HC STATISTIC IV INSERT WITH US BY RN

## 2025-04-30 PROCEDURE — 250N000011 HC RX IP 250 OP 636

## 2025-04-30 PROCEDURE — 82962 GLUCOSE BLOOD TEST: CPT

## 2025-04-30 PROCEDURE — 999N000127 HC STATISTIC PERIPHERAL IV START W US GUIDANCE

## 2025-04-30 PROCEDURE — 86140 C-REACTIVE PROTEIN: CPT

## 2025-04-30 PROCEDURE — 99222 1ST HOSP IP/OBS MODERATE 55: CPT | Performed by: PHYSICIAN ASSISTANT

## 2025-04-30 PROCEDURE — 250N000013 HC RX MED GY IP 250 OP 250 PS 637

## 2025-04-30 PROCEDURE — 93970 EXTREMITY STUDY: CPT

## 2025-04-30 PROCEDURE — 82077 ASSAY SPEC XCP UR&BREATH IA: CPT | Performed by: INTERNAL MEDICINE

## 2025-04-30 PROCEDURE — B4035 ENTERAL FEED SUPP PUMP PER D: HCPCS

## 2025-04-30 PROCEDURE — 250N000013 HC RX MED GY IP 250 OP 250 PS 637: Performed by: INTERNAL MEDICINE

## 2025-04-30 PROCEDURE — 258N000003 HC RX IP 258 OP 636: Performed by: INTERNAL MEDICINE

## 2025-04-30 PROCEDURE — 80053 COMPREHEN METABOLIC PANEL: CPT | Performed by: EMERGENCY MEDICINE

## 2025-04-30 PROCEDURE — 83036 HEMOGLOBIN GLYCOSYLATED A1C: CPT | Performed by: INTERNAL MEDICINE

## 2025-04-30 PROCEDURE — 250N000011 HC RX IP 250 OP 636: Performed by: EMERGENCY MEDICINE

## 2025-04-30 PROCEDURE — 84484 ASSAY OF TROPONIN QUANT: CPT | Performed by: EMERGENCY MEDICINE

## 2025-04-30 PROCEDURE — 99285 EMERGENCY DEPT VISIT HI MDM: CPT | Mod: 25 | Performed by: EMERGENCY MEDICINE

## 2025-04-30 PROCEDURE — 99223 1ST HOSP IP/OBS HIGH 75: CPT | Performed by: NURSE PRACTITIONER

## 2025-04-30 PROCEDURE — 120N000002 HC R&B MED SURG/OB UMMC

## 2025-04-30 PROCEDURE — B4153 EF HYDROLYZED/AMINO ACIDS: HCPCS

## 2025-04-30 PROCEDURE — 272N000451 HC KIT SHRLOCK 5FR POWER PICC DOUBLE LUMEN

## 2025-04-30 PROCEDURE — 36569 INSJ PICC 5 YR+ W/O IMAGING: CPT

## 2025-04-30 PROCEDURE — 93970 EXTREMITY STUDY: CPT | Mod: 26 | Performed by: RADIOLOGY

## 2025-04-30 PROCEDURE — S9342 HIT ENTERAL PUMP DIEM: HCPCS

## 2025-04-30 PROCEDURE — 99223 1ST HOSP IP/OBS HIGH 75: CPT | Mod: AI | Performed by: STUDENT IN AN ORGANIZED HEALTH CARE EDUCATION/TRAINING PROGRAM

## 2025-04-30 PROCEDURE — 258N000001 HC RX 258: Performed by: NURSE PRACTITIONER

## 2025-04-30 PROCEDURE — 96365 THER/PROPH/DIAG IV INF INIT: CPT | Performed by: EMERGENCY MEDICINE

## 2025-04-30 PROCEDURE — 83690 ASSAY OF LIPASE: CPT | Performed by: EMERGENCY MEDICINE

## 2025-04-30 PROCEDURE — 81001 URINALYSIS AUTO W/SCOPE: CPT | Performed by: EMERGENCY MEDICINE

## 2025-04-30 PROCEDURE — 74177 CT ABD & PELVIS W/CONTRAST: CPT

## 2025-04-30 PROCEDURE — B4105 ENZYME CARTRIDGE ENTERAL NUT: HCPCS | Mod: JZ

## 2025-04-30 PROCEDURE — 93010 ELECTROCARDIOGRAM REPORT: CPT | Performed by: EMERGENCY MEDICINE

## 2025-04-30 PROCEDURE — 93306 TTE W/DOPPLER COMPLETE: CPT

## 2025-04-30 PROCEDURE — 250N000009 HC RX 250: Performed by: INTERNAL MEDICINE

## 2025-04-30 RX ORDER — TOPIRAMATE 50 MG/1
100 TABLET, FILM COATED ORAL 2 TIMES DAILY
Status: DISCONTINUED | OUTPATIENT
Start: 2025-04-30 | End: 2025-05-06 | Stop reason: HOSPADM

## 2025-04-30 RX ORDER — CYCLOBENZAPRINE HCL 5 MG
5 TABLET ORAL 3 TIMES DAILY PRN
Status: DISCONTINUED | OUTPATIENT
Start: 2025-04-30 | End: 2025-05-06

## 2025-04-30 RX ORDER — DEXTROSE MONOHYDRATE 100 MG/ML
INJECTION, SOLUTION INTRAVENOUS CONTINUOUS PRN
Status: DISCONTINUED | OUTPATIENT
Start: 2025-04-30 | End: 2025-05-01

## 2025-04-30 RX ORDER — OXYCODONE HYDROCHLORIDE 5 MG/1
5 TABLET ORAL ONCE
Status: COMPLETED | OUTPATIENT
Start: 2025-04-30 | End: 2025-04-30

## 2025-04-30 RX ORDER — HYDROCORTISONE 10 MG/1
10 TABLET ORAL EVERY MORNING
Status: DISCONTINUED | OUTPATIENT
Start: 2025-05-01 | End: 2025-05-06 | Stop reason: HOSPADM

## 2025-04-30 RX ORDER — IOPAMIDOL 755 MG/ML
64 INJECTION, SOLUTION INTRAVASCULAR ONCE
Status: COMPLETED | OUTPATIENT
Start: 2025-04-30 | End: 2025-04-30

## 2025-04-30 RX ORDER — DEXTROSE MONOHYDRATE 25 G/50ML
25-50 INJECTION, SOLUTION INTRAVENOUS
Status: DISCONTINUED | OUTPATIENT
Start: 2025-04-30 | End: 2025-04-30

## 2025-04-30 RX ORDER — LIDOCAINE 40 MG/G
CREAM TOPICAL
Status: ACTIVE | OUTPATIENT
Start: 2025-04-30 | End: 2025-05-03

## 2025-04-30 RX ORDER — AMOXICILLIN 250 MG
1 CAPSULE ORAL 2 TIMES DAILY PRN
Status: DISCONTINUED | OUTPATIENT
Start: 2025-04-30 | End: 2025-05-03

## 2025-04-30 RX ORDER — ACETAMINOPHEN 325 MG/1
975 TABLET ORAL EVERY 8 HOURS
Status: DISCONTINUED | OUTPATIENT
Start: 2025-04-30 | End: 2025-05-06 | Stop reason: HOSPADM

## 2025-04-30 RX ORDER — ONDANSETRON 4 MG/1
4 TABLET, FILM COATED ORAL EVERY 8 HOURS PRN
Status: DISCONTINUED | OUTPATIENT
Start: 2025-04-30 | End: 2025-05-06 | Stop reason: HOSPADM

## 2025-04-30 RX ORDER — PANTOPRAZOLE SODIUM 40 MG/1
40 TABLET, DELAYED RELEASE ORAL 2 TIMES DAILY
Status: DISCONTINUED | OUTPATIENT
Start: 2025-04-30 | End: 2025-05-01

## 2025-04-30 RX ORDER — POTASSIUM CHLORIDE 750 MG/1
20 TABLET, EXTENDED RELEASE ORAL DAILY
Status: DISCONTINUED | OUTPATIENT
Start: 2025-04-30 | End: 2025-05-06 | Stop reason: HOSPADM

## 2025-04-30 RX ORDER — ACETAMINOPHEN 325 MG/1
975 TABLET ORAL ONCE
Status: COMPLETED | OUTPATIENT
Start: 2025-04-30 | End: 2025-04-30

## 2025-04-30 RX ORDER — DEXTROSE MONOHYDRATE 100 MG/ML
INJECTION, SOLUTION INTRAVENOUS CONTINUOUS PRN
Status: DISCONTINUED | OUTPATIENT
Start: 2025-04-30 | End: 2025-05-06 | Stop reason: HOSPADM

## 2025-04-30 RX ORDER — DRONABINOL 2.5 MG/1
5 CAPSULE ORAL 2 TIMES DAILY
Status: DISCONTINUED | OUTPATIENT
Start: 2025-04-30 | End: 2025-05-06 | Stop reason: HOSPADM

## 2025-04-30 RX ORDER — CYCLOBENZAPRINE HCL 5 MG
5 TABLET ORAL
Status: DISCONTINUED | OUTPATIENT
Start: 2025-04-30 | End: 2025-05-06

## 2025-04-30 RX ORDER — HEPARIN SODIUM,PORCINE 10 UNIT/ML
5-15 VIAL (ML) INTRAVENOUS
Status: DISCONTINUED | OUTPATIENT
Start: 2025-04-30 | End: 2025-05-06 | Stop reason: HOSPADM

## 2025-04-30 RX ORDER — HEPARIN SODIUM,PORCINE 10 UNIT/ML
5-15 VIAL (ML) INTRAVENOUS EVERY 24 HOURS
Status: DISCONTINUED | OUTPATIENT
Start: 2025-04-30 | End: 2025-05-06 | Stop reason: HOSPADM

## 2025-04-30 RX ORDER — CALCIUM CARBONATE 500 MG/1
1000 TABLET, CHEWABLE ORAL 4 TIMES DAILY PRN
Status: DISCONTINUED | OUTPATIENT
Start: 2025-04-30 | End: 2025-05-06 | Stop reason: HOSPADM

## 2025-04-30 RX ORDER — GUAIFENESIN 600 MG/1
15 TABLET, EXTENDED RELEASE ORAL DAILY
Status: DISCONTINUED | OUTPATIENT
Start: 2025-04-30 | End: 2025-05-06 | Stop reason: HOSPADM

## 2025-04-30 RX ORDER — DEXTROSE MONOHYDRATE 100 MG/ML
INJECTION, SOLUTION INTRAVENOUS CONTINUOUS
Status: DISCONTINUED | OUTPATIENT
Start: 2025-04-30 | End: 2025-04-30

## 2025-04-30 RX ORDER — AMOXICILLIN 250 MG
2 CAPSULE ORAL 2 TIMES DAILY PRN
Status: DISCONTINUED | OUTPATIENT
Start: 2025-04-30 | End: 2025-05-03

## 2025-04-30 RX ORDER — OXYCODONE HYDROCHLORIDE 5 MG/1
5 TABLET ORAL EVERY 4 HOURS PRN
Status: DISCONTINUED | OUTPATIENT
Start: 2025-04-30 | End: 2025-05-01

## 2025-04-30 RX ORDER — DEXTROSE MONOHYDRATE 25 G/50ML
25-50 INJECTION, SOLUTION INTRAVENOUS
Status: DISCONTINUED | OUTPATIENT
Start: 2025-04-30 | End: 2025-05-01

## 2025-04-30 RX ORDER — NICOTINE POLACRILEX 4 MG
15-30 LOZENGE BUCCAL
Status: DISCONTINUED | OUTPATIENT
Start: 2025-04-30 | End: 2025-05-06 | Stop reason: HOSPADM

## 2025-04-30 RX ORDER — SUCRALFATE 1 G/1
1 TABLET ORAL
Status: DISCONTINUED | OUTPATIENT
Start: 2025-04-30 | End: 2025-05-06 | Stop reason: HOSPADM

## 2025-04-30 RX ORDER — LIDOCAINE 40 MG/G
CREAM TOPICAL
Status: DISCONTINUED | OUTPATIENT
Start: 2025-04-30 | End: 2025-05-06 | Stop reason: HOSPADM

## 2025-04-30 RX ORDER — POLYETHYLENE GLYCOL 3350 17 G/17G
17 POWDER, FOR SOLUTION ORAL 2 TIMES DAILY
Status: DISCONTINUED | OUTPATIENT
Start: 2025-04-30 | End: 2025-05-01

## 2025-04-30 RX ORDER — DICYCLOMINE HYDROCHLORIDE 10 MG/1
10 CAPSULE ORAL EVERY 6 HOURS
Status: DISCONTINUED | OUTPATIENT
Start: 2025-04-30 | End: 2025-05-06 | Stop reason: HOSPADM

## 2025-04-30 RX ORDER — METOCLOPRAMIDE 5 MG/1
10 TABLET ORAL 3 TIMES DAILY
Status: DISCONTINUED | OUTPATIENT
Start: 2025-04-30 | End: 2025-05-06 | Stop reason: HOSPADM

## 2025-04-30 RX ORDER — NICOTINE POLACRILEX 4 MG
15-30 LOZENGE BUCCAL
Status: DISCONTINUED | OUTPATIENT
Start: 2025-04-30 | End: 2025-04-30

## 2025-04-30 RX ORDER — DULOXETIN HYDROCHLORIDE 60 MG/1
60 CAPSULE, DELAYED RELEASE ORAL DAILY
Status: DISCONTINUED | OUTPATIENT
Start: 2025-04-30 | End: 2025-05-06 | Stop reason: HOSPADM

## 2025-04-30 RX ORDER — GABAPENTIN 100 MG/1
100 CAPSULE ORAL 3 TIMES DAILY
Status: DISCONTINUED | OUTPATIENT
Start: 2025-04-30 | End: 2025-05-05

## 2025-04-30 RX ORDER — HYDROCORTISONE 5 MG/1
5 TABLET ORAL AT BEDTIME
Status: CANCELLED | OUTPATIENT
Start: 2025-04-30

## 2025-04-30 RX ORDER — LEVOTHYROXINE SODIUM 125 UG/1
125 TABLET ORAL
Status: DISCONTINUED | OUTPATIENT
Start: 2025-05-01 | End: 2025-05-06 | Stop reason: HOSPADM

## 2025-04-30 RX ORDER — DEXTROSE MONOHYDRATE 100 MG/ML
INJECTION, SOLUTION INTRAVENOUS CONTINUOUS PRN
Status: DISCONTINUED | OUTPATIENT
Start: 2025-04-30 | End: 2025-04-30

## 2025-04-30 RX ORDER — TRAZODONE HYDROCHLORIDE 50 MG/1
50 TABLET ORAL
Status: DISCONTINUED | OUTPATIENT
Start: 2025-04-30 | End: 2025-05-06 | Stop reason: HOSPADM

## 2025-04-30 RX ORDER — AMOXICILLIN 250 MG
1-2 CAPSULE ORAL DAILY PRN
Status: DISCONTINUED | OUTPATIENT
Start: 2025-04-30 | End: 2025-04-30

## 2025-04-30 RX ORDER — FAMOTIDINE 20 MG/1
20 TABLET, FILM COATED ORAL AT BEDTIME
Status: DISCONTINUED | OUTPATIENT
Start: 2025-04-30 | End: 2025-05-06 | Stop reason: HOSPADM

## 2025-04-30 RX ORDER — FUROSEMIDE 40 MG/1
40 TABLET ORAL DAILY
Status: DISCONTINUED | OUTPATIENT
Start: 2025-05-01 | End: 2025-05-06 | Stop reason: HOSPADM

## 2025-04-30 RX ORDER — DEXTROSE MONOHYDRATE AND SODIUM CHLORIDE 5; .45 G/100ML; G/100ML
1000 INJECTION, SOLUTION INTRAVENOUS CONTINUOUS PRN
Status: DISCONTINUED | OUTPATIENT
Start: 2025-04-30 | End: 2025-05-01

## 2025-04-30 RX ORDER — DEXTROSE MONOHYDRATE 25 G/50ML
25-50 INJECTION, SOLUTION INTRAVENOUS
Status: DISCONTINUED | OUTPATIENT
Start: 2025-04-30 | End: 2025-05-06 | Stop reason: HOSPADM

## 2025-04-30 RX ORDER — DULOXETIN HYDROCHLORIDE 30 MG/1
30 CAPSULE, DELAYED RELEASE ORAL DAILY
Status: DISCONTINUED | OUTPATIENT
Start: 2025-04-30 | End: 2025-05-06 | Stop reason: HOSPADM

## 2025-04-30 RX ADMIN — DRONABINOL 5 MG: 5 CAPSULE ORAL at 20:51

## 2025-04-30 RX ADMIN — TOPIRAMATE 100 MG: 100 TABLET, FILM COATED ORAL at 20:55

## 2025-04-30 RX ADMIN — PANCRELIPASE 5 CAPSULE: 60000; 12000; 38000 CAPSULE, DELAYED RELEASE PELLETS ORAL at 17:16

## 2025-04-30 RX ADMIN — OXYCODONE HYDROCHLORIDE 5 MG: 5 TABLET ORAL at 10:13

## 2025-04-30 RX ADMIN — DEXTROSE: 10 SOLUTION INTRAVENOUS at 15:30

## 2025-04-30 RX ADMIN — CYCLOBENZAPRINE HYDROCHLORIDE 5 MG: 5 TABLET, FILM COATED ORAL at 14:38

## 2025-04-30 RX ADMIN — THIAMINE HCL TAB 100 MG 100 MG: 100 TAB at 17:10

## 2025-04-30 RX ADMIN — ACETAMINOPHEN 975 MG: 325 TABLET ORAL at 13:44

## 2025-04-30 RX ADMIN — OXYCODONE HYDROCHLORIDE 5 MG: 5 TABLET ORAL at 13:23

## 2025-04-30 RX ADMIN — GABAPENTIN 100 MG: 100 CAPSULE ORAL at 20:51

## 2025-04-30 RX ADMIN — GABAPENTIN 100 MG: 100 CAPSULE ORAL at 13:44

## 2025-04-30 RX ADMIN — PANTOPRAZOLE SODIUM 40 MG: 40 TABLET, DELAYED RELEASE ORAL at 20:51

## 2025-04-30 RX ADMIN — POLYETHYLENE GLYCOL 3350 17 G: 17 POWDER, FOR SOLUTION ORAL at 20:51

## 2025-04-30 RX ADMIN — ONDANSETRON HYDROCHLORIDE 4 MG: 4 TABLET, FILM COATED ORAL at 13:23

## 2025-04-30 RX ADMIN — IOPAMIDOL 64 ML: 755 INJECTION, SOLUTION INTRAVENOUS at 08:32

## 2025-04-30 RX ADMIN — Medication 5 ML: at 13:32

## 2025-04-30 RX ADMIN — ACETAMINOPHEN 975 MG: 325 TABLET ORAL at 20:51

## 2025-04-30 RX ADMIN — METOCLOPRAMIDE 10 MG: 10 TABLET ORAL at 14:02

## 2025-04-30 RX ADMIN — DICYCLOMINE HYDROCHLORIDE 10 MG: 10 CAPSULE ORAL at 14:02

## 2025-04-30 RX ADMIN — Medication 15 ML: at 17:10

## 2025-04-30 RX ADMIN — METOCLOPRAMIDE 10 MG: 10 TABLET ORAL at 20:55

## 2025-04-30 RX ADMIN — DICYCLOMINE HYDROCHLORIDE 10 MG: 10 CAPSULE ORAL at 20:54

## 2025-04-30 RX ADMIN — INSULIN HUMAN 5.5 UNITS/HR: 1 INJECTION, SOLUTION INTRAVENOUS at 09:25

## 2025-04-30 RX ADMIN — HYDROCORTISONE 15 MG: 10 TABLET ORAL at 20:56

## 2025-04-30 RX ADMIN — SUCRALFATE 1 G: 1 TABLET ORAL at 17:10

## 2025-04-30 RX ADMIN — INSULIN GLARGINE 3 UNITS: 100 INJECTION, SOLUTION SUBCUTANEOUS at 19:15

## 2025-04-30 RX ADMIN — DULOXETINE 60 MG: 60 CAPSULE, DELAYED RELEASE ORAL at 14:04

## 2025-04-30 RX ADMIN — SUCRALFATE 1 G: 1 TABLET ORAL at 22:28

## 2025-04-30 RX ADMIN — DULOXETINE HYDROCHLORIDE 30 MG: 30 CAPSULE, DELAYED RELEASE ORAL at 14:04

## 2025-04-30 RX ADMIN — SODIUM CHLORIDE 1000 ML: 0.9 INJECTION, SOLUTION INTRAVENOUS at 09:08

## 2025-04-30 RX ADMIN — LIDOCAINE HYDROCHLORIDE ANHYDROUS 1 ML: 10 INJECTION, SOLUTION INFILTRATION at 12:38

## 2025-04-30 RX ADMIN — FAMOTIDINE 20 MG: 20 TABLET, FILM COATED ORAL at 22:28

## 2025-04-30 ASSESSMENT — COLUMBIA-SUICIDE SEVERITY RATING SCALE - C-SSRS
2. HAVE YOU ACTUALLY HAD ANY THOUGHTS OF KILLING YOURSELF IN THE PAST MONTH?: NO
1. IN THE PAST MONTH, HAVE YOU WISHED YOU WERE DEAD OR WISHED YOU COULD GO TO SLEEP AND NOT WAKE UP?: NO
6. HAVE YOU EVER DONE ANYTHING, STARTED TO DO ANYTHING, OR PREPARED TO DO ANYTHING TO END YOUR LIFE?: NO

## 2025-04-30 ASSESSMENT — ACTIVITIES OF DAILY LIVING (ADL)
ADLS_ACUITY_SCORE: 59
ADLS_ACUITY_SCORE: 59
ADLS_ACUITY_SCORE: 58
ADLS_ACUITY_SCORE: 59
DEPENDENT_IADLS:: CLEANING;LAUNDRY;SHOPPING;TRANSPORTATION;MEDICATION MANAGEMENT
ADLS_ACUITY_SCORE: 59
ADLS_ACUITY_SCORE: 58
ADLS_ACUITY_SCORE: 59
ADLS_ACUITY_SCORE: 58
ADLS_ACUITY_SCORE: 59

## 2025-04-30 NOTE — TELEPHONE ENCOUNTER
Danica from [facility] looking for an update on orders below. Please reach back out at [phone] ext [phone].

## 2025-04-30 NOTE — PROVIDER NOTIFICATION
04/30/25 1300   PICC 04/30/25 Double Lumen Right Brachial vein medial   Placement Date/Time: 04/30/25 (c) 1301   Lumens (Required): Double Lumen  Lumen Identification: Purple;Red  Catheter Brand: Cynergen  Catheter Size: 5 fr  Lot #: [id number]  Description: Non - valved (open ended);Power PICC  Orientation: Right  Vein : Brach...   Site Assessment WDL   External Cath Length (cm) (S)  3 cm   Extremity Circumference (cm) 18 cm   Dressing Chlorhexidine disk;Transparent;Securement device   Dressing Status clean;intact;dry   Dressing Change Due (S)  05/07/25   Purple - Status blood return noted;saline locked   Purple - Cap Change Due 05/04/25   Purple - Intervention Flushed   Red - Status blood return noted;saline locked   Red - Cap Change Due 05/04/25   Red - Intervention Flushed   Phlebitis Scale 0-->no symptoms   Infiltration? no   PICC Comment (S)  PICC is OK to use

## 2025-04-30 NOTE — TELEPHONE ENCOUNTER
Called Danica- patient is in the ER. Okayed orders. It is unclear if the patient is taking the reglan and cymbalta together, so will clarify at a later time but proceed with medications as listed.    Jose Sales RN on 4/30/2025 at 12:05 PM

## 2025-04-30 NOTE — MEDICATION SCRIBE - ADMISSION MEDICATION HISTORY
/Medication Scribe Admission Medication History    Admission medication history is complete. The information provided in this note is only as accurate as the sources available at the time of the update.    Information Source(s): Hospital records via N/A    Pertinent Information: Chantell declined to review her medications. She states that she already reviewed medications with staff on Sunday and today. Per patient, she is still taking the oxycodone 10 MG (recent prescription for 5 MG) and furosemide 40 MG BID (last dispense 12/09/24 for 90 day supply). Per dispense report, she was given 16 tablets of the oxycodone 5 MG and unable to verify if she still had pills at home. Unable to find in the discharge instructions that she is to take amylase-lipase-protease (CREON 12) 85208 units CPEP (8 capsules with meals and 6 capsules with snacks). Medications were reviewed using dispense report and most recent discharge summary on 04/24/25.     The following were are not listed on dispense report:  Acetone, Urine, Test (KETONE TEST) STRP   CONTOUR NEXT TEST test strip  Injection Device for insulin (NOVOPEN ECHO) RICARDO    Recent admission medication history completed on 04/19/25.    Changes made to PTA medication list:  Added: None  Deleted:   Continuous Blood Gluc Sensor (DEXCOM G6 SENSOR) MISC  Continuous Blood Gluc Transmit (DEXCOM G6 TRANSMITTER) MISC  Continuous Blood Gluc  (DEXCOM G6 ) RICARDO  Continuous Glucose Sensor (DEXCOM G7 SENSOR) MISC DUPLICATE 2x  Changed: None    Allergies reviewed with patient and updates made in EHR: unable to assess    Medication History Completed By: Amairani Simental 4/30/2025 3:54 PM    PTA Med List   Medication Sig Last Dose/Taking    acetaminophen (TYLENOL) 325 MG tablet Take 3 tablets (975 mg) by mouth every 8 hours Unknown    Alcohol Swabs PADS Use to swab the area of the injection or tiago as directed Per insurance coverage Unknown    alendronate (FOSAMAX) 70 MG tablet Take 1  tablet (70 mg) by mouth every 7 days On Sundays take first thing in the morning with plain water and remain upright for at least 30 minutes and until after first food of the day    Do not restart Fosamax (alendronate) until your difficulty swallowing has resolved and you have finished the entire course of fluconazole (Diflucan). Unknown    alum & mag hydroxide-simethicone (MYLANTA/MAALOX) 200-200-25 MG CHEW chewable tablet Take 1 tablet by mouth 3 times daily as needed for indigestion Unknown    amylase-lipase-protease (CREON 12) 35171 units CPEP Take 6 to 8 capsules by mouth with meals and take 4 capsules with snacks. Needs up to 25 capsules per day Unknown    Continuous Glucose  (DEXCOM G7 ) RICARDO Use to read blood sugars as per 's instructions. Unknown    Continuous Glucose Sensor (DEXCOM G7 SENSOR) MISC Change every 10 days. Unknown    cyclobenzaprine (FLEXERIL) 5 MG tablet May take 1 tablet (5 mg) by mouth 3 times daily as needed for muscle spasms. May also take 1 tablet (5 mg) every evening as needed for muscle spasms. Unknown    diclofenac (FLECTOR) 1.3 % Patch Place 1 patch onto the skin 2 times daily Unknown    diclofenac (VOLTAREN) 1 % GEL 2 g Apply 2 g to skin four times a day as needed (to affected upper extremity joint(s)). Maximum 8g/day per joint, 16g/day total. Unknown    dicyclomine (BENTYL) 10 MG capsule Take 10 mg by mouth every 6 hours Unknown    Digestive Enzyme Cartridge (RELIZORB) Device Place 1 each (1 Device) into OG Tube 2 times daily. Administer as 1 cartridge every 12 hours Unknown    dronabinol (MARINOL) 2.5 MG capsule Take 2 capsules (5 mg) by mouth 2 times daily as needed Unknown    DULoxetine (CYMBALTA) 30 MG capsule Take 1 capsule (30 mg) by mouth daily With 60mg capsule for total dose of 90mg Unknown    DULoxetine (CYMBALTA) 60 MG EC capsule Take 1 capsule (60 mg) by mouth daily With 30mg capsule for total dose of 90mg Unknown    famotidine (PEPCID) 20 MG  tablet Take 1 tablet (20 mg) by mouth At Bedtime Unknown    gabapentin (NEURONTIN) 100 MG capsule Take 1 capsule (100 mg) by mouth 3 times daily. Unknown    Glucagon (GVOKE HYPOPEN 2-PACK) 1 MG/0.2ML pen Inject the contents of 1 device under the skin into lower abdomen, outer thigh, or outer upper arm as needed for hypoglycemia. If no response after 15 minutes, additional 1 mg dose from a new device may be injected while waiting for emergency assistance. Unknown    hydrocortisone (CORTEF) 10 MG tablet Take 10 mg in the morning and 10 mg along with a 5 mg tablet at bedtime for a total dose of 15 mg at bedtime. Watch for hypoglycemia recurrence. Unknown    hydrocortisone (CORTEF) 5 MG tablet Take 5 mg by mouth At Bedtime along with a 10 mg tablet for a total dose of 15 mg Unknown    hydrocortisone sodium succinate PF (SOLU-CORTEF) 100 MG injection Inject 100mg (1 mL) into muscle in emergency or unable to take oral hydrocortisone. Go to emergency room if given. ActoVial NDC [address]-42. Note to pharmacy: Provide two 3ml syringes with 23g 1 inch needles. Unknown    insulin aspart (NOVOPEN ECHO) 100 UNIT/ML cartridge As  instructed per physician Unknown    insulin aspart (NOVOPEN ECHO) 100 UNIT/ML cartridge Use 1 unit per 14 grams carb Unknown    Insulin Disposable Pump (OMNIPOD 5 G6 INTRO, GEN 5,) KIT 1 each See Admin Instructions Use continuously to infuse insulin. Unknown    insulin glargine (LANTUS PEN) 100 UNIT/ML pen Inject 3 Units subcutaneously every evening. Unknown    insulin  UNIT/ML vial Inject 4 Units subcutaneously every morning AND 3 Units every evening. Unknown    insulin pen needle (PIP PEN NEEDLES 32G X 4MM) 32G X 4 MM miscellaneous Use 6 pen needles daily or as directed. Unknown    [place] 1.5 Peptide Plain 325 mL Place 1,080 mLs into J tube daily. Infuse [place] Peptide 1.5 @ 45 ml/hr into J-tube via pump  Water flush: 120 ml tid + an additional 550 ml through flushes or oral  intake  Kcals: 1662  Cartons per day: 3.5 Unknown    levothyroxine (SYNTHROID/LEVOTHROID) 125 MCG tablet Take 125 mcg by mouth every morning (before breakfast). Unknown    linaclotide (LINZESS) 145 MCG capsule Take 145 mcg by mouth every morning (before breakfast) as needed Unknown    metoclopramide (REGLAN) 5 MG tablet Take 2 tablets (10 mg) by mouth 3 times daily Unknown    Nutritional Supplements (BOOST HIGH PROTEIN) LIQD After above baseline labs are drawn, give: 6 mL/kg to maximum of 360 mL; the beverage is to be consumed within 5 minutes. (Patient taking differently: as needed. After above baseline labs are drawn, give: 6 mL/kg to maximum of 360 mL; the beverage is to be consumed within 5 minutes.) Unknown    ondansetron (ZOFRAN) 4 MG tablet Take 1 tablet (4 mg) by mouth every 8 hours as needed for nausea Unknown    order for DME by [facility] route [place], [place]  Ph: [phone]  Fax: [phone]    Nutren 1.5 @ 10 ml/hr with advancement by 10 ml/hr q 8 hours to goal rate of 40 ml/hr.  This will provide 1440 kcals (27 kcal/kg/day), 65 g PRO (1.2 g/kg/day), 730 mL H2O, 169 g CHO and no Fiber daily.    Unknown    oxyCODONE (ROXICODONE) 5 MG tablet Take 1 tablet (5 mg) by mouth every 4 hours as needed for pain for 2 days, THEN 1 tablet (5 mg) every 6 hours as needed for pain for 2 days, THEN 1 tablet (5 mg) every 8 hours as needed for pain. Unknown    pantoprazole (PROTONIX) 40 MG EC tablet Take 1 tablet (40 mg) by mouth 2 times daily Unknown    polyethylene glycol (MIRALAX/GLYCOLAX) packet Take 1 packet by mouth 2 times daily. Unknown    potassium chloride ER (KLOR-CON M) 20 MEQ CR tablet Take 1 tablet (20 mEq) by mouth daily Unknown    senna-docusate (SENOKOT-S/PERICOLACE) 8.6-50 MG tablet Take 1-2 tablets by mouth daily as needed for constipation Unknown    Sharps Container MISC Use as directed to dispose of needles, lancets and other sharps Unknown    sodium chloride (OCEAN)  0.65 % nasal spray Spray 1 spray into both nostrils daily as needed for congestion Unknown    sodium chloride, PF, 0.9% PF flush Inject 10-40 mLs into catheter every 8 hours. -- Flush J port and G port EACH with 30 ml water every 8 hours -- Flush J port with 30 ml water BEFORE AND AFTER medications to avoid clogging of this tube. Unknown    sucralfate (CARAFATE) 1 GM tablet Take 1 tablet (1 g) by mouth 4 times daily (before meals and nightly) Unknown    SUMAtriptan (IMITREX) 50 MG tablet Take 1 tablet (50 mg) by mouth at onset of headache for migraine Take 1 Tab by mouth Once as needed for Migraine Headache. May repeat after two hours.  Maximum dose 200 mg/24 hours. Unknown    topiramate (TOPAMAX) 100 MG tablet Take 1 tablet (100 mg) by mouth 2 times daily Unknown    traZODone (DESYREL) 100 MG tablet TAKE 1-2 TABLETS BY MOUTH AN HOUR BEFORE BEDTIME Unknown

## 2025-04-30 NOTE — PROGRESS NOTES
CLINICAL NUTRITION SERVICES - ASSESSMENT NOTE    RECOMMENDATIONS FOR MDs/PROVIDERS TO ORDER:  Pt does not require Creon unless tolerating an oral diet of more than clear liquids. If diet progressed past clears, continue with Creon as ordered for PO intake coverage.   Insulin recommendations per endo  Could consider holding or reinitiating TF's slowly until blood sugars < 400 mg/dL    Registered Dietitian Interventions:  EN access: J portion   Goal TF: Sagrario Marinelli Peptide 1.5 (or equivalent) @ 45 mL/hr (1080 mL/day) to provide 1661 kcal, 80 g protein, 149 g CHO, 16 g fiber, 83 g fat (40% from MCTs), and 756 mL free water daily    150 ml Q4H fluid flushes for tube patency. Additional fluids and/or adjustments per MD.    Multivitamin/minerals to help ensure micronutrient needs being met with suspected hypermetabolic demands and potential interruptions to TF infusions.    100 mg Thiamine x 5-7 days to prevent lyte depletion d/t at risk for refeeding syndrome  RN: For volumes 2533-3975 ml (rates 41-60 ml/hr) - use 3 cartridges daily (1 cartridge q 8 hours)  RN: Obtain cartridges call [id number] ([employer]) and request [employer] #[phone]. Can return to 2 cartridges 1 every 12 hours at discharge per Medicare guidelines       Future/Additional Recommendations:  Monitor nutrition related findings and follow up per protocol  Once diet progressed, consider Ensure Clear per pt request      If pt continues to have signs of malabsorption/steatorrhea when only receiving 2 cartridges a day of Relizorb for TF at home, could consider switching to Creon + sodium bicarb to cover TF's.   If using Creon for TF coverage:  Once begin TFs, begin the following pancreatic enzyme regimen and recommend order each of the following:   A) Sodium Bicarb tablet (325 mg), 1 tablet q 4 hrs via [employer]. Administration Instructions: Crush 1 tablet and mix into 15 ml of warm water and use this solution to mix with Creon pancreatic enzymes. DO NOT administer directly  into Jtube (to be mixed into TF formula with Creon enzyme - see Creon enzyme order)   B) Creon 24, 1- 2 capsules q 4 hrs via Jtube. Administration Instructions:   --If TF rate is running @ 10-20 ml/hr, administer 1 capsule q 4 hrs;   --If TF rate is running @ 20-45 ml/hr, increase to 2 capsules q 4 hrs.    **Open capsule and empty contents into 15 ml sodium bicarb solution (see sodium bicarb order), let dissolve for about 20-30 minutes and then add this solution to the amount of TF formula hung in TF bag every 4 hrs (i.e., once TF @ goal infusion 45 ml/hr will mix 2 capsules into 180 ml of TF formula every 4 hrs).   *Note: this enzyme regimen with TF @ goal infusion will provide approx 3470 units of lipase/gram of total Fat daily and approp dosing initially for pancreatic insufficiency with more elemental TF formula.        REASON FOR ASSESSMENT  Provider order - Registered Dietitian to order TF per Medical Nutrition Therapy Guidelines    SUBJECTIVE INFORMATION  Assessed patient in room.    PERTINENT MEDICAL/CLINICAL HISTORY:   59 year old female who with past medical history of migraines, chronic pancreatitis, post pancreatectomy diabetes, GERD, hypothyroidism, depression, PEG-GJ placed on 4/19/25 who presents to the emergency department for evaluation of bilateral lower extremity edema along with abdominal pain.     Per chart review patient was recently admitted on 4/15/2025 was discharged on 4/24/2025 for PEG tube displacement status post exchange, uncontrolled diabetes.    NUTRITION HISTORY  Pt reports N/V/abdominal pain, however, per pt she does not believe this is related to TF's. Denies concerns with BM's, however, per pt still occasionally having watery or oily stools but this is an improvement from her BM's previously. Pt reports weight gain d/t lower extremity edema. Reports compliance with Relizorb and TF's PTA. TF's running using home supplies at time of visit. Pt reports taking Creon, however, states  "she has not been tolerating PO intake PTA. Discussed that if she is not taking PO intake she does not need to take Creon at this time as Relizorb should be covering TF's.  Home nutrition support plan:   Sagrario Marinelli Peptide 1.5 (or equivalent) @ 45 mL/hr (1080 mL/day) to provide 1661 kcal (36 kcal/kg), 80 g protein (1.75 g/kg), 149 g CHO, 16 g fiber, 83 g fat (40% from MCTs), and 756 mL free water daily  + relizorb     CURRENT NUTRITION ORDERS  Diet: NPO      CURRENT INTAKE/TOLERANCE  No documented intakes to assess.    LABS  Nutrition-relevant labs:  Na 131, Cl 91, BUN 6.3, cre .38, alk phos 155, , , glu 747, A1C 15.8, lipase 7,    MEDICATIONS  Nutrition-relevant medications:  marinol, pepcid, synthroid, linzess, Creon 12 5 capsules with meals(~1271 units lipase/kg/meal), reglan, protonix, miralax, Kcl, carafate, insulin    ANTHROPOMETRICS  Height: 1.575 m (5' 2\")    Admission Weight:     Most Recent Weight:    IBW: 50  kg  % IBW: 94%  BMI: There is no height or weight on file to calculate BMI.   Weight History:  3.2 kg (6%) weight loss over 8 days.  Wt Readings from Last 20 Encounters:   04/29/25 47.2 kg (104 lb)   04/21/25 50.4 kg (111 lb 1.6 oz)   04/03/25 42.8 kg (94 lb 6.4 oz)   01/23/25 45.8 kg (101 lb)   04/04/23 59.2 kg (130 lb 8 oz)   03/08/23 60.3 kg (133 lb)   03/02/23 59.6 kg (131 lb 6.4 oz)   02/10/23 59.2 kg (130 lb 8 oz)   02/07/23 58.8 kg (129 lb 11.2 oz)   11/04/22 57.1 kg (125 lb 12.8 oz)   09/26/22 52.6 kg (116 lb)   04/01/21 66.9 kg (147 lb 8 oz)   11/08/20 67.5 kg (148 lb 13 oz)   11/21/19 67.2 kg (148 lb 3.2 oz)   07/02/19 63.3 kg (139 lb 9.6 oz)   06/26/19 63.5 kg (140 lb)   06/10/19 62.8 kg (138 lb 8 oz)   04/18/19 61.1 kg (134 lb 11.2 oz)   10/04/18 61.7 kg (136 lb)   09/27/18 61.7 kg (136 lb)         Dosing Weight: 47.2 kg, based on actual wt    ASSESSED NUTRITION NEEDS( may be adjusted depending on signs/symptoms of malabsorption)  Estimated Energy Needs: 5429-0288+ kcals/day " (30 - 35 kcals/kg)  Justification: Increased needs  Estimated Protein Needs: 71-94 grams protein/day (1.5 - 2 grams of pro/kg)  Justification: Increased needs  Estimated Fluid Needs: 2084-3325 mL/day (30 - 35 mL/kg)  Justification: Maintenance    SYSTEM FINDINGS    GI symptoms:  Last BM: 04/30/25 .   Skin/wounds: No issues noted    MALNUTRITION  % Intake: Decreased intake does not meet criteria  % Weight Loss: Weight loss does not meet criteria   Subcutaneous Fat Loss: Buccal: Severe  Muscle Loss: Clavicles (pectoralis and deltoids): Severe  Fluid Accumulation/Edema: Moderate to severe, 2-4+  Malnutrition Diagnosis: Severe malnutrition in the context of chronic illness  Malnutrition Present on Admission: Yes    NUTRITION DIAGNOSIS  Altered gastrointestinal (GI) function related to history of chronic pancreatitis/pancreatectomy and insulin dependent DM as evidenced by reliance on PERT and insulin.     INTERVENTIONS  Enteral nutrition management  Nutrition related medication management  Vitamin and mineral supplement therapy    Goals  Total avg nutritional intake to meet a minimum of 30 kcal/kg and 1.5 g PRO/kg daily (per dosing wt 47.2 kg).      Monitoring/Evaluation  Progress toward goals will be monitored and evaluated per policy.    Jennifer Worthy MS, RD, LD, [facility]    6C (beds 6037-1351) + 7C (beds 8665-1921) + ED + Obs  Available in [facility] by name or unit dietitian

## 2025-04-30 NOTE — ED TRIAGE NOTES
Pt arrives to the ED with c/o right sided abdominal pain that radiates into her back and bilateral leg swelling. Pt states that the pain started 5-6 days ago. Pt also endorses some nausea, vomiting, and loose stool. Pt states she is short of breath with activity and become dizzy and lightheaded. Max temp at home 102.2F yesterday.     Denies any chest pain    Past medical history of insulin-dependent diabetes mellitus after pancreatectomy, chronic pancreatitis, migraines, hypothyroidism, and G tube nutrition     VSS and afebrile in triage

## 2025-04-30 NOTE — PROGRESS NOTES
Vascular Access Services Notes:     PICC/Midline to be placed today as ordered. Primary RN to assure that patient have had Chlorhexidine Gluconate (CHG) bath & linen change prior to procedure. RN to contact VAS once done.         Ezequiel Schuster, ASHLEYN, RN [facility]  Vascular Access Services  [facility]  [phone]

## 2025-04-30 NOTE — CONSULTS
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   Consult Requestor: Dr. Aisha Shabbir           History of Present Illness:   Chantell Kidd is a 59 year old with insulin-dependent diabetes mellitus after pancreatectomy, chronic pancreatitis, migraine, hypothyroidism, and G tube nutrition  , who was admitted on 4/30/2025 presented to the ED with bilateral lower extremity edema along with abdominal pain and found to by hyperglycemic. Inpatient Diabetes Service consulted for management of hyperglycemia.     History obtained via the patient, chart review and discussion with primary team.       Additional Historian:  none    Patient is  known to the Inpatient Diabetes Service from past admission(s).Last seen during last admission from 4/15/2025- 4/24/2025     Last dose insulin PTA: Lantus 3 units at 9 pm last evening, NPH 3 units at 9 pm. Novolog 10 units at 0130 am and 6 units 20 minutes later then presented to the ED.    Current inpatient regimen:  Insulin drip.     BG at time of consult: 238  Planned Procedures/Surgeries: Possible Gtube exchange.     Relevant Labs on Admission:  if contributory, otherwise see labs below  Renal function: Creatinine (0.38), eGFR (>90)    BMP: Na (131), K (4.2) Bicarb (25), Anion Gap (15), Glucose (747)  BhB: N/A   Lactic Acid: N/A   Infectious Disease: COVID (N/A), Influenza (N/A), Blood Cultures (N/A )       Inpatient Glucose Trends:           Diabetes History:   Diabetes Type and Duration: Pancreatogenic diabetes s/p total pancreatectomy and islet autotransplant with insulin dependence since 1/2012  GAD65 antibody, zinc transporter 8 antibody, islet antibody, insulin antibody not available on epic  search.     Numerous C-peptides:  Component      Latest Ref Rng 8/28/2018  6:47 AM 9/3/2018  6:41 PM 9/6/2018  8:00 AM 9/7/2018  6:55 AM 4/18/2019  11:04 AM 4/3/2025  2:01 PM   C-Peptide      0.9 - 6.9 ng/mL 0.3 (L)  0.2 (L)  1.8  2.8  1.8  0.5 (L)    Patient Fasting?           Yes          PTA Medication Regimen:     Discharged on 4/24 with the following insulin regimen:   Long-acting insulin: glargine (Lantus)  3 units once daily at 6pm.      2) Rapid-acting insulin: aspart (Novolog) carbohydrate coverage/mealtime insulin - take 1 unit per 14 grams of carbohydrates before meals and snacks.     3) Rapid-acting insulin: aspart (Novolog) correction - see chart      Blood Glucose Insulin Before Meals When Eating or every 4-6 hours when receiving tube feeding:   Less than 175 0 units   175 -224  1 units   225 - 274 2 units   275 - 324 3 units   325 - 374 4 units    375 - 424  5 units   425 - 474 6 units   475 or more 7 units         4) Intermediate-acting insulin: Novolin N (NPH). Take to cover tube feeds (dosed for Sagrario Marinelli at 45 ml/hr)   - Take Novolin N (NPH) 4 units at 9AM   - Take Novolin N (NPH) 3 units at 9PM      (If your rate of tube feed were to decrease, you can reference the list below)  If TF rate at 20 ml per hour, give 1 units  If TF rate at 30 ml per hour, give 3 units  If TF rate from 40-45 ml/hr, give 5 units    Patient reports her TF was at goal and she was taking NPH 4 units in am and NPH 3 units in PM.      Reports on the above regimen, BGs elevated to 400's sometimes running higher than meter can read. She would give herself 10 units of Novolog if BG says high. And then check again in 20 minutes and give herself an additional units based on the number and what the sliding scale reads. She denies any low blood sugars the last 4 days.     Previous to her last admission, when she was in [place] and was discharged on TF:   - Lantus 18 units AM, 12 units PM (Covering TF)   - Novolog ICR 1:16  - Novolog  "correction 1:35 (more intuitive dosing)    Notes when she was on a total of 30 units of Lantus, she had to use short acting in addtion. She does not remember what her blood sugars were at that time.   Missing doses?: denies  Historical Diabetes Medications: MDI, insulin pumps     Glucose monitoring device/frequency/trends: Reports difficulty obtaining Dexcom and Omnipods, insurance is waiting on paperwork, her doctors's office says they sent it. H Accucheck (checking 4-6 times daily) running 400 to \"high\" for the past 4 days.   Hypoglycemia PTA:   - Frequency: none  - Severity: + hx of hypoglycemic seizures  - Awareness: absent/decreased  - Treatment: Orange juice.      Outpatient Diabetes Provider: [facility]: Dr. Maher (LOV 4/3/25)  Formal Diabetes Education/Educator: yes     ------------------------  Complications:  + peripheral neuropathy (numbness, pain in feet, altered sensation), denies retinopathy (last eye exam: 2024 will need to re-estabish in [place]), denies nephropathy, unclear if gastroparesis (Sub-optimal study in March 2016 showed 100% retention at 1 hour and then rapid emptying), denies macrovascular disease       Most recent A1c: 15.8% 4/30/25    Hemoglobin at time of most recent A1c: 10.5  RBC transfusion 3 months prior to most recent A1c:  none       History of DKA: yes - in past 6 months in [place] (2024)     Safety Plan:                - Glucagon: +Gvoke                - Ketone Strips: not asked  Medic Alert if Type 1: not asked     Diet/Lifestyle/Living Situation: Reports continued poor oral intake when she returned home. Does drink coffee in the morning. Has been on [employer] Peptide 1.5 @ 45 ml/hr- goal provides 149 g CHO per day   Ability to [place] Prescribed Regimen:  Does not appear patient is giving insulin correctly.   Food/Housing Insecurity: no concerns          Medications Impacting Glycemia:    Steroids:  PTA hydrocortisone 10 mg am, 15 mg HS (adrenal insufficiency)  "   D5W-containing solutions/medications: none   Other medications impacting glucose: none          Diet/Nutrition:    None     Supplements:  none  TF: 4/30-Sagrario Marinelli Peptide 1.5 @ 45 ml/hr- goal provides 149 g CHO per day. Stopped at 1500 due to TF coiled and Gtube exchange planned for 5/2  TPN: none            Review of Systems:    CC:   Constitutional:  fever and chills. .    HEENT: Reports headache   Cardiac: Reports chest pain  Respiratory: Reports dyspnea on exertion and at rest.   GI: Reports abdominal pain, tenderness and bloating. Reports nausea and vomiting. Denies changes in stool pattern, constipation and diarrhea.    : Reports increased urination with diuretics.    MSK/Integumentary:Reports increased swelling/edema. Denies any new rashes or wounds.          Past Medical History:       Past Medical History:   Diagnosis Date    Chronic abdominal pain     Chronic pancreatitis (H)     S/P pancreatectomy    Depression with anxiety     Gastro-oesophageal reflux disease     Hypothyroidism 4/23/2015    Kidney stones     Low serum cortisol level     Migraines     Other chronic pain     STOMACH    Other chronic pain     LUMBAR SPINE    Peripheral neuropathy     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    TPIAT    Spasm of sphincter of Oddi              Past Surgical History:      Past Surgical History:   Procedure Laterality Date    ARTHROPLASTY CARPOMETACARPAL (THUMB JOINT)  05/02/2014    Procedure: ARTHROPLASTY CARPOMETACARPAL (THUMB JOINT);  Surgeon: Carina Panda MD;  Location: [facility]    CHOLECYSTECTOMY  01/01/2004    COLONOSCOPY  07/18/2014    Procedure: COLONOSCOPY;  Surgeon: Aurora Sahu MD;  Location:  GI    COLONOSCOPY N/A 08/01/2017    Procedure: COLONOSCOPY;  Colonoscopy and upper endoscopy;  Surgeon: Deirdre Harris MD;  Location:  GI    ENDOSCOPIC RETROGRADE CHOLANGIOPANCREATOGRAM      ENDOSCOPIC RETROGRADE CHOLANGIOPANCREATOGRAM  04/19/2011     Procedure:ENDOSCOPIC RETROGRADE CHOLANGIOPANCREATOGRAM; Pancreatic Stent Placement      ENDOSCOPIC RETROGRADE CHOLANGIOPANCREATOGRAM  05/26/2011    Procedure:ENDOSCOPIC RETROGRADE CHOLANGIOPANCREATOGRAM; with Pancreatic Stent Removal; Surgeon:DALE MIMS; Location:[facility]    ENDOSCOPY UPPER, COLONOSCOPY, COMBINED  04/25/2012    Procedure:COMBINED ENDOSCOPY UPPER, COLONOSCOPY; Enteroscopy with Bile Duct Stent Removal, Colonoscopy  *[facility] Safe Room*; Surgeon:GRACY GODWINIQ; Location[facility]    ESOPHAGOSCOPY, GASTROSCOPY, DUODENOSCOPY (EGD), COMBINED  05/26/2011    Procedure:COMBINED ESOPHAGOSCOPY, GASTROSCOPY, DUODENOSCOPY (EGD); Surgeon:DALE MIMS; [facility]    ESOPHAGOSCOPY, GASTROSCOPY, DUODENOSCOPY (EGD), COMBINED N/A 10/30/2014    Procedure: COMBINED ESOPHAGOSCOPY, GASTROSCOPY, DUODENOSCOPY (EGD), BIOPSY SINGLE OR MULTIPLE;  Surgeon: Sarai Moon MD;  [facility]: [facility]    ESOPHAGOSCOPY, GASTROSCOPY, DUODENOSCOPY (EGD), COMBINED Left 07/06/2015    Procedure: COMBINED ESOPHAGOSCOPY, GASTROSCOPY, DUODENOSCOPY (EGD), BIOPSY SINGLE OR MULTIPLE;  Surgeon: Thomas Estrada MD;  [facility]    ESOPHAGOSCOPY, GASTROSCOPY, DUODENOSCOPY (EGD), COMBINED N/A 07/08/2016    Procedure: COMBINED ESOPHAGOSCOPY, GASTROSCOPY, DUODENOSCOPY (EGD), BIOPSY SINGLE OR MULTIPLE;  Surgeon: Eloy Klein MD;  [facility]    ESOPHAGOSCOPY, GASTROSCOPY, DUODENOSCOPY (EGD), COMBINED N/A 08/04/2016    Procedure: COMBINED ESOPHAGOSCOPY, GASTROSCOPY, DUODENOSCOPY (EGD), BIOPSY SINGLE OR MULTIPLE;  Surgeon: Jason Brown MD;  [facility]    ESOPHAGOSCOPY, GASTROSCOPY, DUODENOSCOPY (EGD), COMBINED N/A 08/01/2017    Procedure: COMBINED ESOPHAGOSCOPY, GASTROSCOPY, DUODENOSCOPY (EGD);;  Surgeon: Deirdre Harris MD;  [facility]    ESOPHAGOSCOPY, GASTROSCOPY, DUODENOSCOPY (EGD), COMBINED N/A 06/12/2019    Procedure: ESOPHAGOGASTRODUODENOSCOPY (EGD);  Surgeon:  Jose Francisco Perdomo MD;  [facility]    GYN SURGERY      Hysterectomy and USO    [facility] ENDOSCOPY W EUS  07/20/2011    Procedure:COMBINED ENDOSCOPIC ULTRASOUND, ESOPHAGOSCOPY, GASTROSCOPY, DUODENOSCOPY (EGD); Surgeon:DARVIN DONOHUE; [facility]    HERNIORRHAPHY VENTRAL N/A 09/15/2016    Procedure: HERNIORRHAPHY VENTRAL;  Surgeon: Juanita Bernabe MD;  [facility]    HYSTERECTOMY  1997 or 1998    USO    INCISION AND DRAINAGE ABDOMEN WASHOUT, COMBINED  08/16/2012    Procedure: COMBINED INCISION AND DRAINAGE ABDOMEN WASHOUT;  ,debridement and Drainage Post Appendectomy;  Surgeon: Ron Austin MD;  [facility]    INJECT TRANSVERSUS ABDOMINIS PLANE (TAP) BLOCK BILATERAL Bilateral 05/26/2016    Procedure: INJECT TRANSVERSUS ABDOMINIS PLANE (TAP) BLOCK BILATERAL;  Surgeon: Leonard Mccallum MD;  [facility]    IR NG TUBE PLACEMENT REQ RAD & FLUORO  12/04/2017    LAPAROSCOPIC APPENDECTOMY  07/30/2012    Procedure: LAPAROSCOPIC APPENDECTOMY;  Open Appendectomy;  Surgeon: Ron Austin MD;  [facility]    PANCREATECTOMY, TRANSPLANT AUTO ISLET CELL, COMBINED  01/06/2012    Procedure:COMBINED PANCREATECTOMY, TRANSPLANT AUTO ISLET CELL; Total  Pancreatectomy, Auto Islet Transplant, splenectomy, 18fr. transgastric-jejunal feeding tube placement, liver biopsy; Surgeon:PALAK LEE; [facility]    PICC DOUBLE LUMEN PLACEMENT Right 04/19/2025    5FR DL PICC, brachial medial vein. L-38cm, 3cm out.    PICC INSERTION - DOUBLE LUMEN Right 04/30/2025    39-3cm, Medial brachial vein    REPLACE GASTROSTOMY TUBE, PERCUTANEOUS N/A 08/30/2017    Procedure: REPLACE GASTROSTOMY TUBE, PERCUTANEOUS;  GJ Tube Change;  Surgeon: Jose Nath PA-C;  [facility]    SPLENECTOMY               Social History:      Social History     Tobacco Use    Smoking status: Never    Smokeless tobacco: Never   Substance Use Topics    Alcohol use: No     Alcohol/week: 0.0 standard drinks  of alcohol        History   Sexual Activity    Sexual activity: Yes             Family History:    Family History of Diabetes:     Family History   Problem Relation Age of Onset    Hypertension Mother     Diabetes Mother     Osteoporosis Mother     Cancer Father         pancreatic cancer    Diabetes Maternal Grandmother     Cardiovascular Maternal Grandmother     Cancer Maternal Grandfather         lung cancer    Cancer Sister         brain    Cancer Sister         liver cancer             Physical Exam:    /83   Pulse 78   Temp 98.7  F (37.1  C) (Oral)   Resp 18   SpO2 99%    General:  well appearing, in no acute distress.  HEENT:  NC/AT.   Lungs:  unremarkable  ABD:  rounded  Skin:  warm and dry,  MSK:   moving all extremities  Lymp:   + LE edema  Mental Status:  Alert and oriented x3  Psych:   Cooperative, good eye contact, full/appropriate affect         Laboratory Data:      Lab Results   Component Value Date    A1C 15.8 (H) 04/30/2025    A1C 19.1 (H) 04/15/2025    A1C 18.9 (H) 04/03/2025    A1C 17.1 (H) 01/23/2025    A1C 11.1 (H) 03/02/2023    A1C 7.3 (H) 11/09/2020    A1C 6.7 (A) 11/21/2019    A1C 8.2 (H) 06/11/2019    A1C Canceled, Test credited 06/10/2019    A1C 9.6 (H) 04/18/2019     Recent Labs   Lab Test 04/30/25  0530   WBC 5.9   RBC 3.36*   HGB 10.5*   HCT 30.7*   MCV 91   MCH 31.3   MCHC 34.2   RDW 13.1   *     Recent Labs   Lab Test 04/30/25  1030 04/30/25  0908 04/30/25  0530 04/24/25  0857 04/24/25  0700   NA  --   --  131*  --  139   POTASSIUM  --   --  4.2  --  4.6   CHLORIDE  --   --  91*  --  104   CO2  --   --  25  --  27   ANIONGAP  --   --  15  --  8   * >600* 747*   < > 266*   BUN  --   --  6.3*  --  20.2   CR  --   --  0.38*  --  0.43*   ELIZA  --   --  9.1  --  8.4*    < > = values in this interval not displayed.     Recent Labs   Lab Test 04/30/25  0530   PROTTOTAL 6.3*   ALBUMIN 3.8   BILITOTAL 0.3   ALKPHOS 155*   *   *            Assessment and  Recommendations:       Assessment:   Post-pancreatectomy diabetes s/p TPIAT 1/2012 treated as Type 1 Diabetes Mellitus complicated by peripheral neuropathy, hypoglycemia unawareness, and hyperglycemia. Poor control  (A1c 19.1 % 4/16/25, Hgb: 11.9)  TF/Steroid induced Hyperglycemia     Plan/Recommendations:    - Give lantus 3 units,  2 hour following administration, STOP/transition off IV insulin    -  Prn IV insulin drip if BGs >300 x 2 checks.   - Novolog Meal Coverage: 1 units per 20 g CHO, TID AC and PRN with snacks/supplements   - BG monitoring: Every 1-2 hours on insulin drip then every 4 hours.  - Start low correction scale ()  q 4 hour. when insulin drip is stopped.   - prn D10 10-50 .If Blood glucose is 80 or less, start D10 at 10 ml and titrate up to get  or more.  Stop D10 if blood glucose is 125 or more    - Hypoglycemia protocol    - Carb counting protocol      Discussion:  Presented to the ED with bilateral lower extremity edema along with abdominal pain and found to by hyperglycemic. Concerning patient discharged on low doses of Lantus and NPH needing baseline 10 units of intermediate/Long acting insulin at discharge on 4/24. Patient reports when she returning home, she followed the recommended Lantus 3 units dose, and Low doses of NPH, but required an additional 10-15 units every 4 hours during the day for elevated BGs up to 400-500's suspect some of this due to uncovered carbs as patient reports she continued to have poor appetite, but did spike when she ate during her hospital stay. Concern also about technique of giving herself insulin. She reports her  watches her give insulin. Will continue to assess insulin needs while she is here. Unfortunately, patient's G tube is coiled, making her most likely NPO at might, making it difficult to assess insulin needs in the next 12-24 hours.  Minimal PO intake outpatient. Will add a low ICR.  Will keep patient on insulin drip while she is  NPO.  Will place Test claim to find out where we are in the process with insurance for Omnipod and Dexcom.    Addendum 1500: GI recommending to hold TF. Plan for exchange of PEG-J to PEG + new PEJ placement on 5/2. Will be able to eat orally. Will continue low dose of carb coverage. Start Lantus 3 units and discontinue insulin drip 2 hours later. Will add prn D10 if BG is less than 80 and titrate to 100 or more. Will add prn insulin drip if BGs elevated to 300 x 2 checks.         Discharge Planning: (tentative)    Medications: TBD    Test Claims:  none needed.   Education:  Needs to be assessed closer to discharge.    Outpatient Follow-up:  recommend [facility]; next scheduled 5/13/25 with Libby Jay RN and 8/21/25 with Dr. Maher            Thank you for this consult. IDS will continue to follow.      Please notify Inpatient Diabetes Service if changes are planned to steroids, nutrition, TPN/TF and anticipated procedures requiring prolonged NPO status.     BEE Felix CNP    To contact Inpatient Diabetes Service:     7 AM - 5 PM: Page the IDS DAVID following the patient that day (see filed or incomplete progress notes/consult notes under Endocrinology)    OR if uncertain of provider assignment: page job code [phone]    5 PM - 7 AM: First call after hours is to primary service.    For urgent after-hours questions, page job code for on call fellow: [phone]     I spent a total of 80 minutes on the date of the encounter doing prep/post-work, chart review, history and exam, documentation and further activities per the note including lab review, multidisciplinary communication, counseling the patient and/or coordinating care regarding acute hyper/hypoglycemic management, as well as discharge management and planning/communication.  See note for details.

## 2025-04-30 NOTE — ED NOTES
Emergency Department Patient Sign-out       Brief HPI:  This is a 59 year old female signed out to me by Dr. CHANTALE Pettit   .  See initial ED Provider note for details of the presentation.            Significant Events prior to my assuming care: labs ordered      Exam:   Patient Vitals for the past 24 hrs:   BP Temp Temp src Pulse Resp SpO2   04/30/25 1411 122/76 -- -- 79 -- 99 %   04/30/25 1100 137/83 98.7  F (37.1  C) Oral 78 18 99 %   04/30/25 1048 (!) 146/93 -- -- 74 -- 100 %   04/30/25 0859 (!) 159/79 98.4  F (36.9  C) Oral 104 18 99 %   04/30/25 0737 (!) 132/94 98.2  F (36.8  C) Oral 73 -- 99 %   04/30/25 0500 135/86 -- -- -- -- --   04/30/25 0452 136/86 98.1  F (36.7  C) Oral 84 16 99 %           ED RESULTS:   Results for orders placed or performed during the hospital encounter of 04/30/25 (from the past 24 hours)   [place] Draw     Status: None (In process)    Collection Time: 04/30/25  5:30 AM    Narrative    The following orders were created for panel order [place] [facility].  Procedure                               Abnormality         Status                     ---------                               -----------         ------                     Extra Blue Top Tube[[id number]]                             In process                 Extra Red Top Tube[[id number]]                              In process                 Extra Green Top (Lithiu...[[id number]]                      In process                 Extra Purple Top Tube[[id number]]                           In process                   Please view results for these tests on the individual orders.   CBC with platelets differential     Status: Abnormal    Collection Time: 04/30/25  5:30 AM    Narrative    The following orders were created for panel order CBC with platelets differential.  Procedure                               Abnormality         Status                     ---------                               -----------         ------                      CBC with platelets and ...[[id number]]  Abnormal            Final result                 Please view results for these tests on the individual orders.   Comprehensive metabolic panel     Status: Abnormal    Collection Time: 04/30/25  5:30 AM   Result Value Ref Range    Sodium 131 (L) 135 - 145 mmol/L    Potassium 4.2 3.4 - 5.3 mmol/L    Carbon Dioxide (CO2) 25 22 - 29 mmol/L    Anion Gap 15 7 - 15 mmol/L    Urea Nitrogen 6.3 (L) 8.0 - 23.0 mg/dL    Creatinine 0.38 (L) 0.51 - 0.95 mg/dL    GFR Estimate >90 >60 mL/min/1.73m2    Calcium 9.1 8.8 - 10.4 mg/dL    Chloride 91 (L) 98 - 107 mmol/L    Glucose 747 (HH) 70 - 99 mg/dL    Alkaline Phosphatase 155 (H) 40 - 150 U/L     (H) 0 - 45 U/L     (H) 0 - 50 U/L    Protein Total 6.3 (L) 6.4 - 8.3 g/dL    Albumin 3.8 3.5 - 5.2 g/dL    Bilirubin Total 0.3 <=1.2 mg/dL   Lipase     Status: Abnormal    Collection Time: 04/30/25  5:30 AM   Result Value Ref Range    Lipase 7 (L) 13 - 60 U/L   Troponin T, High Sensitivity     Status: Normal    Collection Time: 04/30/25  5:30 AM   Result Value Ref Range    Troponin T, High Sensitivity <6 <=14 ng/L   Nt probnp inpatient (BNP)     Status: Normal    Collection Time: 04/30/25  5:30 AM   Result Value Ref Range    N terminal Pro BNP Inpatient 205 0 - 900 pg/mL   Magnesium     Status: Normal    Collection Time: 04/30/25  5:30 AM   Result Value Ref Range    Magnesium 1.9 1.7 - 2.3 mg/dL   CBC with platelets and differential     Status: Abnormal    Collection Time: 04/30/25  5:30 AM   Result Value Ref Range    WBC Count 5.9 4.0 - 11.0 10e3/uL    RBC Count 3.36 (L) 3.80 - 5.20 10e6/uL    Hemoglobin 10.5 (L) 11.7 - 15.7 g/dL    Hematocrit 30.7 (L) 35.0 - 47.0 %    MCV 91 78 - 100 fL    MCH 31.3 26.5 - 33.0 pg    MCHC 34.2 31.5 - 36.5 g/dL    RDW 13.1 10.0 - 15.0 %    Platelet Count 574 (H) 150 - 450 10e3/uL    % Neutrophils 64 %    % Lymphocytes 24 %    % Monocytes 9 %    % Eosinophils 2 %    % Basophils 1 %    % Immature  Granulocytes 0 %    NRBCs per 100 WBC 0 <1 /100    Absolute Neutrophils 3.8 1.6 - 8.3 10e3/uL    Absolute Lymphocytes 1.4 0.8 - 5.3 10e3/uL    Absolute Monocytes 0.5 0.0 - 1.3 10e3/uL    Absolute Eosinophils 0.1 0.0 - 0.7 10e3/uL    Absolute Basophils 0.1 0.0 - 0.2 10e3/uL    Absolute Immature Granulocytes 0.0 <=0.4 10e3/uL    Absolute NRBCs 0.0 10e3/uL   Ethyl Alcohol Level     Status: Normal    Collection Time: 04/30/25  5:30 AM   Result Value Ref Range    Alcohol ethyl <0.01 <=0.01 g/dL   Hemoglobin A1c     Status: Abnormal    Collection Time: 04/30/25  5:30 AM   Result Value Ref Range    Estimated Average Glucose 407 (H) <117 mg/dL    Hemoglobin A1C 15.8 (H) <5.7 %   CRP inflammation     Status: Normal    Collection Time: 04/30/25  5:30 AM   Result Value Ref Range    CRP Inflammation <3.00 <5.00 mg/L   UA with Microscopic reflex to Culture     Status: Abnormal    Collection Time: 04/30/25  6:11 AM    Specimen: Urine, Clean Catch   Result Value Ref Range    Color Urine Light Yellow Colorless, Straw, Light Yellow, Yellow    Appearance Urine Clear Clear    Glucose Urine >=1000 (A) Negative mg/dL    Bilirubin Urine Negative Negative    Ketones Urine Negative Negative mg/dL    Specific Gravity Urine 1.034 1.003 - 1.035    Blood Urine Negative Negative    pH Urine 7.0 5.0 - 7.0    Protein Albumin Urine Negative Negative mg/dL    Urobilinogen Urine Normal Normal mg/dL    Nitrite Urine Negative Negative    Leukocyte Esterase Urine Negative Negative    RBC Urine 1 <=2 /HPF    WBC Urine 2 <=5 /HPF    Squamous Epithelials Urine <1 <=1 /HPF    Narrative    Urine Culture not indicated   EKG 12-lead, tracing only     Status: None    Collection Time: 04/30/25  6:14 AM   Result Value Ref Range    Systolic Blood Pressure  mmHg    Diastolic Blood Pressure  mmHg    Ventricular Rate 75 BPM    Atrial Rate 75 BPM    WI Interval 162 ms    QRS Duration 72 ms     ms    QTc 431 ms    P Axis 65 degrees    R AXIS 48 degrees    T Axis  "57 degrees    Interpretation ECG       Sinus rhythm  Normal ECG  Unconfirmed report - interpretation of this ECG is computer generated - see medical record for final interpretation  Confirmed by - EMERGENCY ROOM, PHYSICIAN ([id number]),  OMKAR PHILLIPS ([id number]) on 4/30/2025 7:53:13 AM     US Lower Extremity Venous Duplex Bilateral     Status: None    Collection Time: 04/30/25  8:02 AM    Narrative    Exam: Ultrasound of the deep venous system of bilateral lower  extremities dated 4/30/2025 8:02 AM    Clinical information: Bilateral lower extremity pain    Comparison: CT abdomen/pelvis 1/28/2025    Technique: Gray-scale evaluation with compression and Doppler  assessment of deep venous system for spontaneous and phasic flow, as  well as the presence of distal augmentation. Color flow images  obtained as needed. Gray-scale images with compression of the great  saphenous vein obtained as needed.    Findings:    Right leg:    CFV: Thrombus: No, Phasic: Yes  Femoral vein, proximal: Thrombus: No, Phasic: Yes  Femoral vein, mid: Thrombus: No, Phasic: Yes  Femoral vein, distal: Thrombus: No, Phasic: Yes  Popliteal vein: Thrombus: No, Phasic: Yes  PTV: Thrombus: No  Peroneal vein: Thrombus: No    Left leg:    CFV: Thrombus: No, Phasic: Yes  Femoral vein, proximal: Thrombus: No, Phasic: Yes  Femoral vein, mid: Thrombus: No, Phasic: Yes  Femoral vein, distal: Thrombus: No, Phasic: Yes  Popliteal vein: Thrombus: No, Phasic: Yes  PTV: Thrombus: Thrombus: No  Peroneal vein: Thrombus: No    Soft tissue edema is present in the lower extremities bilaterally.      Impression    Impression:    1. No DVT in either lower extremity  2. Bilateral soft tissue edema in the legs.      Reference: \"Duplex Ultrasound in the Diagnosis of Lower-Extremity Deep  Venous Thrombosis\"- Tonie Watkins MD, S; Jama Sahu MD  (Circulation. 2014;129:917-921. http://circ.ahajournals.org )    RAUL SALGADO MD         SYSTEM ID:  [id number]   CT " Abdomen Pelvis w Contrast     Status: None    Collection Time: 04/30/25  8:53 AM    Narrative    EXAMINATION: CT ABDOMEN PELVIS W CONTRAST, 4/30/2025 8:53 AM    INDICATION: right sided abdominal pain    COMPARISON STUDY: CT 4/16/2025    TECHNIQUE: CT scan of the abdomen and pelvis was performed on  multidetector CT scanner using volumetric acquisition technique and  images were reconstructed in multiple planes with variable thickness  and reviewed on dedicated workstations.     CONTRAST: 64cc of [id number] injected IV without oral contrast    CT scan radiation dose is optimized to minimum requisite dose using  automated dose modulation techniques.    FINDINGS:    Lower thorax: Bibasilar dependent subsegmental atelectasis.    Liver: No mass.     Biliary System: The gallbladder is surgically absent. No extrahepatic  biliary ductal dilation. Pneumobilia, similar to prior.    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    Adrenal glands: No mass or nodules    Spleen: Surgically absent.    Kidneys: No suspicious mass, obstructing calculus or hydronephrosis.    Gastrointestinal tract: Postoperative changes of Venessa-en-Y gastric  bypass. Percutaneous gastrostomy tube coils in the stomach. No bowel  obstruction. Moderate to large colonic stool burden. Redundant colon  as discussed previously. Appendix is not clearly identified.  Fecalization in distal small bowel loops likely represents some  stasis.    Mesentery/peritoneum/retroperitoneum: No free air. Small volume free  fluid, similar to prior.    Lymph nodes: No significant lymphadenopathy.    Vasculature: Patent major abdominal vasculature allowing for late  arterial/early venous phase of scanning.    Pelvis: Urinary bladder is well distended and unremarkable.  Status  post hysterectomy.    Osseous structures: No aggressive or acute osseous lesion.  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      Soft tissues: Diffuse soft tissue  anasarca, similar to prior.      Impression    IMPRESSION:   1. No acute intra-abdominal pathology suspected. Appendix is not  clearly identified however no definite localizing findings in the  right lower quadrant are appreciated.  2. Stable postoperative changes of Venessa-en-Y gastric bypass.  Percutaneous gastrostomy tube has been replaced with tube coiling in  the stomach.  3. Moderate to large colonic stool burden. No evidence of bowel  obstruction.   4. Additional chronic and postsurgical changes as described in the  body of the report are not significantly changed from prior.    I have personally reviewed the examination and initial interpretation  and I agree with the findings.    JANETTE MIGUEL MD         SYSTEM ID:  [id number]   Glucose by meter     Status: Abnormal    Collection Time: 04/30/25  9:08 AM   Result Value Ref Range    GLUCOSE BY METER POCT >600 (HH) 70 - 99 mg/dL   Glucose by meter     Status: Abnormal    Collection Time: 04/30/25 10:30 AM   Result Value Ref Range    GLUCOSE BY METER POCT 483 (H) 70 - 99 mg/dL   Glucose by meter     Status: Abnormal    Collection Time: 04/30/25 11:26 AM   Result Value Ref Range    GLUCOSE BY METER POCT 393 (H) 70 - 99 mg/dL   Glucose by meter     Status: Abnormal    Collection Time: 04/30/25 12:46 PM   Result Value Ref Range    GLUCOSE BY METER POCT 238 (H) 70 - 99 mg/dL   Double Lumen PICC Placement     Status: None    Collection Time: 04/30/25  1:01 PM    Narrative    Ezequiel Schuster RN     4/30/2025  1:01 PM  [facility]    Double Lumen PICC Placement    Date/Time: 4/30/2025 1:01 PM    Performed by: Ezequiel Schuster RN  Authorized by: Celia Beatty MD  Indications: vascular access      UNIVERSAL PROTOCOL   Site Marked: Yes  Prior Images Obtained and Reviewed:  Yes  Required items: Required blood products, implants, devices and special   equipment available    Patient identity confirmed:  Verbally  with patient, hospital-assigned   identification number, arm band and provided demographic data  Patient was reevaluated immediately before administering moderate or deep   sedation or anesthesia  Confirmation Checklist:  Patient's identity using two indicators,   procedure was appropriate and matched the consent or emergent situation,   correct equipment/implants were available and relevant allergies  Time out: Immediately prior to the procedure a time out was called    Universal Protocol: the Joint Commission Universal Protocol was followed    Preparation: Patient was prepped and draped in usual sterile fashion       ANESTHESIA    Anesthesia:  See MAR for details  Local Anesthetic:  Lidocaine 1% without epinephrine  Anesthetic Total (mL):  1      SEDATION    Patient Sedated: No        Preparation: skin prepped with ChloraPrep  Skin prep agent: skin prep agent completely dried prior to procedure  Sterile barriers: maximum sterile barriers were used: cap, mask, sterile   gown, sterile gloves, and large sterile sheet  Hand hygiene: hand hygiene performed prior to central venous catheter   insertion  Type of line used: PICC  Catheter type: double lumen  Lumen type: non-valved and power PICC  Lumen Identification: Purple and Red  Catheter size: 5 Fr  Brand: Bard  Lot number: [id number]  Placement method: venipuncture, MST, ultrasound and tip navigation system  Number of attempts: 1  Difficulty threading catheter: no  Successful placement: yes  Orientation: right  Catheter to Vein (%): 39  Location: brachial vein (medial)  Tip Location: SVC  Arm circumference: adults 10 cm  Extremity circumference: 18  Visible catheter length: 3  Total catheter length: 39  Dressing and securement: alcohol impregnated caps, chlorhexidine disc   applied, transparent dressing, sterile dressing applied, statlock and site   cleansed  Post procedure assessment: blood return through all ports, free fluid flow   and placement verified by 3CG  technology  PROCEDURE Describe Procedure: Patient tolerated well and denied pain   immediately after procedure.  PICC tip is in satisfactory location as verified by [employer] 3CG Tip   Confirmation System. PICC is OK to use.  Disposal: sharps and needle count correct at the end of procedure, needles   and guidewire disposed in sharps container  Patient Tolerance:  Patient tolerated the procedure well with no immediate   complications   EKG 12-lead, complete     Status: None    Collection Time: 25  1:14 PM   Result Value Ref Range    Systolic Blood Pressure  mmHg    Diastolic Blood Pressure  mmHg    Ventricular Rate 79 BPM    Atrial Rate 79 BPM    IL Interval 152 ms    QRS Duration 72 ms     ms    QTc 442 ms    P Axis 75 degrees    R AXIS 64 degrees    T Axis 80 degrees    Interpretation ECG       Sinus rhythm  Nonspecific ST abnormality  Abnormal ECG  Unconfirmed report - interpretation of this ECG is computer generated - see medical record for final interpretation  Confirmed by - EMERGENCY ROOM, PHYSICIAN ([id number]),  OMKAR PHILLIPS ([id number]) on 2025 2:06:41 PM     Echo Complete     Status: None    Collection Time: 25  2:01 PM   Result Value Ref Range    LVEF  60-65%     Narrative    [id number]  [id number]  [id number]  [id number]^[email or web address]^FELIPE     [facility],[place]  Echocardiography Laboratory  [address] [place], [place] [address]     Name: MONET ZHANG  MRN: [id number]  : 1965  Study Date: 2025 01:39 PM  Age: 59 yrs  Gender: Female  Patient Location: [facility]  Reason For Study: Edema  Ordering Physician: SHABBIR, AISHA  Performed By: Maria Esther Grullon     BSA: 1.4 m2  Height: 62 in  Weight: 104 lb  BP: 137/83 mmHg  ______________________________________________________________________________  Procedure  Echocardiogram with two-dimensional, color and spectral  Doppler.  ______________________________________________________________________________  Interpretation Summary  Left ventricular size is normal. Global and regional left ventricular function  is normal with an EF of 60-65%.  Right ventricular function, chamber size, wall motion, and thickness are  normal.  No significant valvular abnormalities were noted.  This study was compared with the study from 8/10/16: No significant changes  noted.  ______________________________________________________________________________  Left Ventricle  Global and regional left ventricular function is normal with an EF of 60-65%.  Left ventricular size is normal. Left ventricular wall thickness is normal.  Left ventricular diastolic function is normal.     Right Ventricle  Right ventricular function, chamber size, wall motion, and thickness are  normal.     Atria  Both atria appear normal.     Mitral Valve  The mitral valve is normal. Trace mitral insufficiency is present.     Aortic Valve  The valve leaflets are not well visualized. On Doppler interrogation, there is  no significant stenosis or regurgitation.     Tricuspid Valve  The tricuspid valve is normal. Trace tricuspid insufficiency is present. The  peak velocity of the tricuspid regurgitant jet is not obtainable. Pulmonary  artery systolic pressure cannot be assessed.     Pulmonic Valve  The valve leaflets are not well visualized. On Doppler interrogation, there is  no significant stenosis or regurgitation.     Vessels  The aorta root is normal. The thoracic aorta is normal. The pulmonary artery  cannot be assessed. The inferior vena cava was normal in size with preserved  respiratory variability. IVC diameter <2.1 cm collapsing >50% with sniff  suggests a normal RA pressure of 3 mmHg.     Pericardium  No pericardial effusion is present.     Compared to Previous Study  This study was compared with the study from 8/10/16 . No significant  changes  noted.  ______________________________________________________________________________  MMode/2D Measurements & Calculations  IVSd: 0.73 cm  LVIDd: 3.5 cm  LVIDs: 2.3 cm  LVPWd: 0.78 cm  FS: 32.9 %  LV mass(C)d: 68.0 grams  LV mass(C)dI: 47.0 grams/m2  Ao root diam: 3.1 cm  asc Aorta Diam: 3.1 cm  LVOT diam: 2.0 cm  LVOT area: 3.1 cm2  Ao root diam index Ht(cm/m): 2.0  Ao root diam index BSA (cm/m2): 2.1  Asc Ao diam index BSA (cm/m2): 2.1  Asc Ao diam index Ht(cm/m): 1.9  LA Volume (BP): 26.4 ml     LA Volume Index (BP): 18.2 ml/m2  RWT: 0.45  TAPSE: 1.4 cm     Doppler Measurements & Calculations  MV E max fabio: 80.9 cm/sec  MV A max fabio: 73.3 cm/sec  MV E/A: 1.1  MV dec time: 0.19 sec  Ao V2 max: 110.0 cm/sec  Ao max P.8 mmHg  Ao V2 mean: 72.7 cm/sec  Ao mean PG: 3.0 mmHg  Ao V2 VTI: 20.7 cm  LIDIA(I,D): 2.6 cm2  LIDIA(V,D): 2.7 cm2  LV V1 max PG: 3.6 mmHg  LV V1 max: 94.4 cm/sec  LV V1 VTI: 17.6 cm  SV(LVOT): 54.6 ml  SI(LVOT): 37.7 ml/m2  AV Fabio Ratio (DI): 0.86  LIDIA Index (cm2/m2): 1.8  E/E' av.1     Lateral E/e': 9.0  Medial E/e': 9.2  RV S Fabio: 12.8 cm/sec     ______________________________________________________________________________  Report approved by: Abdelrahman Cottrell MD on 2025 02:45 PM             ED MEDICATIONS:   Medications   dextrose 5% and 0.45% NaCl infusion (has no administration in time range)   dextrose 50 % injection 25-50 mL (has no administration in time range)   insulin regular (MYXREDLIN) 1 unit/mL infusion (1.5 Units/hr Intravenous Rate/Dose Change 25 1435)   lidocaine 1 % 0.1-5 mL (1 mL Other $Given 25 1238)   lidocaine (LMX4) cream (has no administration in time range)   lidocaine 1 % 0.1-1 mL (has no administration in time range)   lidocaine (LMX4) cream (has no administration in time range)   sodium chloride (PF) 0.9% PF flush 3 mL (3 mLs Intracatheter Not Given 25 1311)   sodium chloride (PF) 0.9% PF flush 3 mL (has no administration in time  range)   senna-docusate (SENOKOT-S/PERICOLACE) 8.6-50 MG per tablet 1 tablet (has no administration in time range)     Or   senna-docusate (SENOKOT-S/PERICOLACE) 8.6-50 MG per tablet 2 tablet (has no administration in time range)   calcium carbonate (TUMS) chewable tablet 1,000 mg (has no administration in time range)   acetaminophen (TYLENOL) tablet 975 mg (975 mg Oral $Given 4/30/25 1344)   alum & mag hydroxide-simethicone (MAALOX) 200-200-25 MG chewable tablet 1 tablet (has no administration in time range)   lipase-protease-amylase (CREON 12) 88602-76334-98289 units per capsule 5 capsule (has no administration in time range)   cyclobenzaprine (FLEXERIL) tablet 5 mg (5 mg Oral $Given 4/30/25 1438)     And   cyclobenzaprine (FLEXERIL) tablet 5 mg (has no administration in time range)   dicyclomine (BENTYL) capsule 10 mg (10 mg Oral $Given 4/30/25 1402)   droNABinol (MARINOL) capsule 5 mg (has no administration in time range)   DULoxetine (CYMBALTA) DR capsule 60 mg (60 mg Oral $Given 4/30/25 1404)   DULoxetine (CYMBALTA) DR capsule 30 mg (30 mg Oral $Given 4/30/25 1404)   famotidine (PEPCID) tablet 20 mg (has no administration in time range)   gabapentin (NEURONTIN) capsule 100 mg (100 mg Oral $Given 4/30/25 1344)   levothyroxine (SYNTHROID/LEVOTHROID) tablet 125 mcg (has no administration in time range)   linaclotide (LINZESS) capsule 145 mcg (has no administration in time range)   metoclopramide (REGLAN) tablet 10 mg (10 mg Oral $Given 4/30/25 1402)   ondansetron (ZOFRAN) tablet 4 mg (4 mg Oral $Given 4/30/25 1323)   pantoprazole (PROTONIX) EC tablet 40 mg (has no administration in time range)   polyethylene glycol (MIRALAX) Packet 17 g (has no administration in time range)   potassium chloride minerva ER (KLOR-CON M10) CR tablet 20 mEq (20 mEq Oral Not Given 4/30/25 1340)   sucralfate (CARAFATE) tablet 1 g (has no administration in time range)   topiramate (TOPAMAX) tablet 100 mg (has no administration in time  range)   traZODone (DESYREL) tablet 50 mg (has no administration in time range)   sodium chloride (PF) 0.9% PF flush 10-40 mL ( Intracatheter Not Given 4/30/25 1312)   sodium chloride (PF) 0.9% PF flush 10-20 mL (has no administration in time range)   heparin lock flush 10 unit/mL injection 5-15 mL (5 mLs Intracatheter $Given 4/30/25 1332)   heparin lock flush 10 unit/mL injection 5-15 mL (has no administration in time range)   oxyCODONE (ROXICODONE) tablet 5 mg (5 mg Oral $Given 4/30/25 1323)   dextrose 10% infusion (has no administration in time range)   hydrocortisone (CORTEF) tablet 10 mg (has no administration in time range)   hydrocortisone (CORTEF) tablet 15 mg (has no administration in time range)   sodium chloride 0.9% BOLUS 1,000 mL (0 mLs Intravenous Stopped 4/30/25 1010)   acetaminophen (TYLENOL) tablet 975 mg (975 mg Oral Not Given 4/30/25 0908)   iopamidol (ISOVUE-370) solution 64 mL (64 mLs Intravenous $Given 4/30/25 0832)   sodium chloride (PF) 0.9% PF flush 66 mL (66 mLs Intravenous $Given 4/30/25 0832)   oxyCODONE (ROXICODONE) tablet 5 mg (5 mg Oral $Given 4/30/25 1013)   sodium chloride (PF) 0.9% PF flush 10-40 mL (20 mLs Intracatheter $Given 4/30/25 1238)         Impression:    ICD-10-CM    1. Peripheral edema  R60.0       2. Generalized abdominal pain  R10.84       3. Hyperglycemia  R73.9           Plan:    Pending studies include labs significant for glu 700's but no suggestion of DKA, US neg DVT, CT abd no acute pathologies, started insulin gtt and NS, D/W Med-admit IMC.        Celia Beatty MD, Celia Triplett MD  04/30/25 [id number]

## 2025-04-30 NOTE — CONSULTS
Care Management Initial Consult    General Information  Assessment completed with: Chantell Mchugh  Type of CM/SW Visit: Initial Assessment  Primary Care Provider verified and updated as needed: Yes   Readmission within the last 30 days: previous discharge plan unsuccessful   Return Category: Progression of disease  Reason for Consult: utilization management concerns, discharge planning  Advance Care Planning: Advance Care Planning Reviewed: no concerns identified          Communication Assessment  Patient's communication style: spoken language (English or Bilingual)           Cognitive  Cognitive/Neuro/Behavioral: WDL                      Living Environment:   People in home: spouse  Omi  Current living Arrangements: house      Able to return to prior arrangements: yes       Family/Social Support:  Care provided by: self, spouse/significant other  Provides care for: no one, unable/limited ability to care for self  Marital Status:   Support system: , Children  Bernabe       Description of Support System: Supportive, Involved    Support Assessment: Adequate family and caregiver support, Patient communicates needs well met    Current Resources:   Patient receiving home care services: Yes  Skilled Home Care Services: Skilled Nursing, Physical Therapy, Occupational Therapy  Community Resources: DME, Home Care, Home Infusion  Equipment currently used at home: grab bar, tub/shower, walker, standard, wheelchair, manual  Supplies currently used at home: Enteral Nutrition & Supplies    Employment/Financial:  Employment Status: disabled     Financial Concerns: none   Referral to Financial Worker: No       Does the patient's insurance plan have a 3 day qualifying hospital stay waiver?  No    Lifestyle & Psychosocial Needs:  Social Drivers of Health     Food Insecurity: High Risk (4/16/2025)    Food Insecurity     Within the past 12 months, did you worry that your food would run out before you got money to  buy more?: Yes     Within the past 12 months, did the food you bought just not last and you didn t have money to get more?: Yes   Depression: At risk (1/23/2025)    PHQ-2     PHQ-2 Score: 6   Housing Stability: Low Risk  (4/16/2025)    Housing Stability     Do you have housing? : Yes     Are you worried about losing your housing?: No   Tobacco Use: Low Risk  (4/17/2025)    Patient History     Smoking Tobacco Use: Never     Smokeless Tobacco Use: Never     Passive Exposure: Not on file   Financial Resource Strain: Low Risk  (4/16/2025)    Financial Resource Strain     Within the past 12 months, have you or your family members you live with been unable to get utilities (heat, electricity) when it was really needed?: No   Alcohol Use: Not on file   Transportation Needs: Low Risk  (4/16/2025)    Transportation Needs     Within the past 12 months, has lack of transportation kept you from medical appointments, getting your medicines, non-medical meetings or appointments, work, or from getting things that you need?: No   Physical Activity: Not on file   Interpersonal Safety: Low Risk  (4/16/2025)    Interpersonal Safety     Do you feel physically and emotionally safe where you currently live?: Yes     Within the past 12 months, have you been hit, slapped, kicked or otherwise physically hurt by someone?: No     Within the past 12 months, have you been humiliated or emotionally abused in other ways by your partner or ex-partner?: No   Stress: Not on file   Social Connections: Not on file   Health Literacy: Not on file       Functional Status:  Prior to admission patient needed assistance:   Dependent ADLs:: Dressing, Bathing, Toileting, Grooming  Dependent IADLs:: Cleaning, Laundry, Shopping, Transportation, Medication Management  Assesssment of Functional Status: Not at baseline with ADL Functioning    Mental Health Status:  Mental Health Status: No Current Concerns       Chemical Dependency Status:  Chemical Dependency  Status: No Current Concerns             Values/Beliefs:  Spiritual, Cultural Beliefs, Denominational Practices, Values that affect care: no               Discussed  Partnership in Safe Discharge Planning  document with patient/family: No    Additional Information:    Care Management Assessment completed due to elevated risk score. SW met with patient at bedside to complete assessment.    Patient was seen by Care Management during her last admission (4/16-4/24). She was discharged with home infusion, home care, and DME orders. Since returning home, patient states she received a walker and wheelchair that was ordered for her. Patient states she completed an intake with [facility] on Sunday 4/27 but they have not yet come to her home to provide services. Patient is open with [facility] for TF needs.    Patient denies any other changes at home and reports her needs being well met. Patient's  Bernabe assists with some of her I/ADLs listed above. He will provide her with transportation home at discharge.    Discharge resources:    [facility](TF)  Phone: [phone]  Fax: [phone]     [facility] (RN/PT/OT)  Phone: [phone]  Fax: [phone]      Next Steps:   - send orders as necessary/appropriate to I and [facility]  - IMM    JOYCE Aparicio   4/30/2025       Social Work and Care Management Department       SEARCHABLE in [facility] - search SOCIAL WORK       [place] (0800 - 1630) Saturday and Sunday     Units: 4A Vocera, 4C Vocera, & 4E Vocera        Units: 5A 8070-9295 Vocera, 5A 1603-1133 Vocera , BMT SW 1 BMT SW 2, BMT SW 3 & BMT SW 4  5C Off Service 5401 - 5416  5C Off Service 7963-8095     Units: 6A Vocera & 6B Vocera      Units: 6C Vocera     Units: 7A Vocera & 7B Vocera      Units: 7C Med Surg 7401 thru 7418 and 7C Med Surg 7502 thru 7521      Unit: [place] ED Vocera & [place] Obs Vocera     [facility] (2997-8867) Saturday and  Sunday      Units: 5 Ortho Vocera, 5 Med Surg Vocera & WB ED Vocera     Units: 6 Med Surg Vocera, 8 Med Surg Vocera, & 10 ICU Vocera      After hours Vocera [facility] and After Hours Vocera [place]     Please NOTE changes to times below:    **Saturday & Sunday (1630 - 2030)    **Mon-Fri (4848-3974)     **[facility] Recognized Holidays  (5473-7549)    Units: ALL   - see above VOCERA links to units

## 2025-04-30 NOTE — PROGRESS NOTES
Vascular Access Services Notes:    VAS RN busy with lab draws & PIV placements. VAS RN will place PICC as soon as available.      Ezequiel Schuster, BSN, RN [facility]  Vascular Access Services  [facility]  [phone]

## 2025-04-30 NOTE — PLAN OF CARE
Goal Outcome Evaluation:      Plan of Care Reviewed With: patient    Overall Patient Progress: no changeOverall Patient Progress: no change     Pt anticipates discharge home with [facility] and [facility]

## 2025-04-30 NOTE — PROGRESS NOTES
Writer approached by endocrine PA to change insulin gtt algorthim from 3 to 1 and rate from 1.5u/hr to 0.5u/hr due to rapidly declining glucose values. Pump settings changed to reflect. Blood sugar obtained and read 79, gtt paused at this time.

## 2025-04-30 NOTE — ED PROVIDER NOTES
ED Provider Note  [facility]      History     Chief Complaint   Patient presents with    Leg Swelling    Nausea, Vomiting, & Diarrhea    Abdominal Pain     HPI  Chantell Kidd is a 59 year old female who with past medical history of migraines, chronic pancreatitis, post pancreatectomy diabetes, GERD, hypothyroidism, depression, PEG-GJ placed on 4/19/25 who presents to the emergency department for evaluation of bilateral lower extremity edema along with abdominal pain.    Per chart review patient was recently admitted on 4/15/2025 was discharged on 4/24/2025 for PEG tube displacement status post exchange, uncontrolled diabetes.    Reports that since discharge she has developed bilateral lower extremity feet, leg, hand swelling that is progressively worsening over the past 5 days.  Patient also complains of increased pain in her bilateral lower extremities.  Patient has been taking her Lasix at home with good urine output however no improvement of symptoms.  Patient also complains of right-sided abdominal pain that has now spread to her back that has been also ongoing since Friday as well as feeling weak, dizzy.  Patient denies any chest pain, cough, shortness of breath, denies any dysuria, hematuria.  No other complaints.    Past Medical History  Past Medical History:   Diagnosis Date    Chronic abdominal pain     Chronic pancreatitis (H)     S/P pancreatectomy    Depression with anxiety     Gastro-oesophageal reflux disease     Hypothyroidism 4/23/2015    Kidney stones     Low serum cortisol level     Migraines     Other chronic pain     STOMACH    Other chronic pain     LUMBAR SPINE    Peripheral neuropathy     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    TPIAT    Spasm of sphincter of Oddi      Past Surgical History:   Procedure Laterality Date    ARTHROPLASTY CARPOMETACARPAL (THUMB JOINT)  05/02/2014    Procedure: ARTHROPLASTY CARPOMETACARPAL (THUMB JOINT);  Surgeon: Carina Panda,  MD;  Location: [facility]    CHOLECYSTECTOMY  01/01/2004    COLONOSCOPY  07/18/2014    Procedure: COLONOSCOPY;  Surgeon: Aurora Sahu MD;  [facility]: [facility]    COLONOSCOPY N/A 08/01/2017    Procedure: COLONOSCOPY;  Colonoscopy and upper endoscopy;  Surgeon: Deirdre Harris MD;  [facility]    ENDOSCOPIC RETROGRADE CHOLANGIOPANCREATOGRAM      ENDOSCOPIC RETROGRADE CHOLANGIOPANCREATOGRAM  04/19/2011    Procedure:ENDOSCOPIC RETROGRADE CHOLANGIOPANCREATOGRAM; Pancreatic Stent Placement      ENDOSCOPIC RETROGRADE CHOLANGIOPANCREATOGRAM  05/26/2011    Procedure:ENDOSCOPIC RETROGRADE CHOLANGIOPANCREATOGRAM; with Pancreatic Stent Removal; Surgeon:DALE MIMS; Location[facility]    ENDOSCOPY UPPER, COLONOSCOPY, COMBINED  04/25/2012    Procedure:COMBINED ENDOSCOPY UPPER, COLONOSCOPY; Enteroscopy with Bile Duct Stent Removal, Colonoscopy  *[facility] Room*; Surgeon:GRACY GODWINIQ; [facility]    ESOPHAGOSCOPY, GASTROSCOPY, DUODENOSCOPY (EGD), COMBINED  05/26/2011    Procedure:COMBINED ESOPHAGOSCOPY, GASTROSCOPY, DUODENOSCOPY (EGD); Surgeon:DALE MIMS; [facility]    ESOPHAGOSCOPY, GASTROSCOPY, DUODENOSCOPY (EGD), COMBINED N/A 10/30/2014    Procedure: COMBINED ESOPHAGOSCOPY, GASTROSCOPY, DUODENOSCOPY (EGD), BIOPSY SINGLE OR MULTIPLE;  Surgeon: Sarai Moon MD;  [facility]    ESOPHAGOSCOPY, GASTROSCOPY, DUODENOSCOPY (EGD), COMBINED Left 07/06/2015    Procedure: COMBINED ESOPHAGOSCOPY, GASTROSCOPY, DUODENOSCOPY (EGD), BIOPSY SINGLE OR MULTIPLE;  Surgeon: Thomas Estrada MD;  [facility]    ESOPHAGOSCOPY, GASTROSCOPY, DUODENOSCOPY (EGD), COMBINED N/A 07/08/2016    Procedure: COMBINED ESOPHAGOSCOPY, GASTROSCOPY, DUODENOSCOPY (EGD), BIOPSY SINGLE OR MULTIPLE;  Surgeon: Eloy Klein MD;  [facility]    ESOPHAGOSCOPY, GASTROSCOPY, DUODENOSCOPY (EGD), COMBINED N/A 08/04/2016    Procedure: COMBINED ESOPHAGOSCOPY, GASTROSCOPY,  DUODENOSCOPY (EGD), BIOPSY SINGLE OR MULTIPLE;  Surgeon: Jason Brown MD;  [facility]    ESOPHAGOSCOPY, GASTROSCOPY, DUODENOSCOPY (EGD), COMBINED N/A 08/01/2017    Procedure: COMBINED ESOPHAGOSCOPY, GASTROSCOPY, DUODENOSCOPY (EGD);;  Surgeon: Deirdre Harris MD;  [facility]    ESOPHAGOSCOPY, GASTROSCOPY, DUODENOSCOPY (EGD), COMBINED N/A 06/12/2019    Procedure: ESOPHAGOGASTRODUODENOSCOPY (EGD);  Surgeon: Jose Francisco Perdomo MD;  [facility]    GYN SURGERY      Hysterectomy and USO    [facility] ENDOSCOPY W EUS  07/20/2011    Procedure:COMBINED ENDOSCOPIC ULTRASOUND, ESOPHAGOSCOPY, GASTROSCOPY, DUODENOSCOPY (EGD); Surgeon:DARVIN DONOHUE; Location: GI    HERNIORRHAPHY VENTRAL N/A 09/15/2016    Procedure: HERNIORRHAPHY VENTRAL;  Surgeon: Juanita Bernabe MD;  [facility]    HYSTERECTOMY  1997 or 1998    USO    INCISION AND DRAINAGE ABDOMEN WASHOUT, COMBINED  08/16/2012    Procedure: COMBINED INCISION AND DRAINAGE ABDOMEN WASHOUT;  ,debridement and Drainage Post Appendectomy;  Surgeon: Ron Austin MD;  [facility]    INJECT TRANSVERSUS ABDOMINIS PLANE (TAP) BLOCK BILATERAL Bilateral 05/26/2016    Procedure: INJECT TRANSVERSUS ABDOMINIS PLANE (TAP) BLOCK BILATERAL;  Surgeon: Leonard Mccallum MD;  [facility]    IR NG TUBE PLACEMENT REQ RAD & FLUORO  12/04/2017    LAPAROSCOPIC APPENDECTOMY  07/30/2012    Procedure: LAPAROSCOPIC APPENDECTOMY;  Open Appendectomy;  Surgeon: Ron Austin MD;  [facility]    PANCREATECTOMY, TRANSPLANT AUTO ISLET CELL, COMBINED  01/06/2012    Procedure:COMBINED PANCREATECTOMY, TRANSPLANT AUTO ISLET CELL; Total  Pancreatectomy, Auto Islet Transplant, splenectomy, 18fr. transgastric-jejunal feeding tube placement, liver biopsy; Surgeon:PALAK LEE; [facility]    PICC DOUBLE LUMEN PLACEMENT Right 04/19/2025    5FR DL PICC, brachial medial vein. L-38cm, 3cm out.    REPLACE GASTROSTOMY TUBE, PERCUTANEOUS  N/A 08/30/2017    Procedure: REPLACE GASTROSTOMY TUBE, PERCUTANEOUS;  GJ Tube Change;  Surgeon: Jose Nath PA-C;  [facility]    SPLENECTOMY       acetaminophen (TYLENOL) 325 MG tablet  Acetone, Urine, Test (KETONE TEST) STRP  Alcohol Swabs PADS  alendronate (FOSAMAX) 70 MG tablet  alum & mag hydroxide-simethicone (MYLANTA/MAALOX) 200-200-25 MG CHEW chewable tablet  amylase-lipase-protease (CREON 12) 72342 units CPEP  Continuous Blood Gluc  (DEXCOM G6 ) RICARDO  Continuous Blood Gluc Sensor (DEXCOM G6 SENSOR) MISC  Continuous Blood Gluc Transmit (DEXCOM G6 TRANSMITTER) MISC  Continuous Glucose  (DEXCOM G7 ) RICARDO  Continuous Glucose Sensor (DEXCOM G7 SENSOR) MISC  Continuous Glucose Sensor (DEXCOM G7 SENSOR) MISC  Continuous Glucose Sensor (DEXCOM G7 SENSOR) MISC  CONTOUR NEXT TEST test strip  cyclobenzaprine (FLEXERIL) 5 MG tablet  diclofenac (FLECTOR) 1.3 % Patch  diclofenac (VOLTAREN) 1 % GEL  dicyclomine (BENTYL) 10 MG capsule  Digestive Enzyme Cartridge (RELIZORB) Device  dronabinol (MARINOL) 2.5 MG capsule  DULoxetine (CYMBALTA) 30 MG capsule  DULoxetine (CYMBALTA) 60 MG EC capsule  famotidine (PEPCID) 20 MG tablet  gabapentin (NEURONTIN) 100 MG capsule  Glucagon (GVOKE HYPOPEN 2-PACK) 1 MG/0.2ML pen  hydrocortisone (CORTEF) 10 MG tablet  hydrocortisone (CORTEF) 5 MG tablet  hydrocortisone sodium succinate PF (SOLU-CORTEF) 100 MG injection  Injection Device for insulin (NOVOPEN ECHO) RICARDO  insulin aspart (NOVOPEN ECHO) 100 UNIT/ML cartridge  insulin aspart (NOVOPEN ECHO) 100 UNIT/ML cartridge  Insulin Disposable Pump (OMNIPOD 5 G6 INTRO, GEN 5,) KIT  insulin glargine (LANTUS PEN) 100 UNIT/ML pen  insulin  UNIT/ML vial  insulin pen needle (PIP PEN NEEDLES 32G X 4MM) 32G X 4 MM miscellaneous  [place] 1.5 Peptide Plain 325 mL  levothyroxine (SYNTHROID/LEVOTHROID) 125 MCG tablet  linaclotide (LINZESS) 145 MCG capsule  metoclopramide (REGLAN) 5 MG  tablet  Nutritional Supplements (BOOST HIGH PROTEIN) LIQD  ondansetron (ZOFRAN) 4 MG tablet  order for DME  pantoprazole (PROTONIX) 40 MG EC tablet  polyethylene glycol (MIRALAX/GLYCOLAX) packet  potassium chloride ER (KLOR-CON M) 20 MEQ CR tablet  senna-docusate (SENOKOT-S/PERICOLACE) 8.6-50 MG tablet  [place] Container MISC  sodium chloride (OCEAN) 0.65 % nasal spray  sodium chloride, PF, 0.9% PF flush  sucralfate (CARAFATE) 1 GM tablet  SUMAtriptan (IMITREX) 50 MG tablet  topiramate (TOPAMAX) 100 MG tablet  traZODone (DESYREL) 100 MG tablet      Allergies   Allergen Reactions    Corticosteroids Other (See Comments)     All oral, IV and injectable steroids are contraindicated (unless in life threatening situations) in Islet Auto transplant recipients. They can cause irreversible loss of islet cell function. Please contact patient's transplant care coordinator YURI Cortez RN at [phone]/pager [phone] and/or endocrinologist prior to administration.      Chocolate Flavoring Agent (Non-Screening) Rash     Breaks out when eats chocolate     Family History  Family History   Problem Relation Age of Onset    Hypertension Mother     Diabetes Mother     Osteoporosis Mother     Cancer Father         pancreatic cancer    Diabetes Maternal Grandmother     Cardiovascular Maternal Grandmother     Cancer Maternal Grandfather         lung cancer    Cancer Sister         brain    Cancer Sister         liver cancer     Social History   Social History     Tobacco Use    Smoking status: Never    Smokeless tobacco: Never   Vaping Use    Vaping status: Never Used   Substance Use Topics    Alcohol use: No     Alcohol/week: 0.0 standard drinks of alcohol    Drug use: No      Past medical history, past surgical history, medications, allergies, family history, and social history were reviewed with the patient. No additional pertinent items.   A medically appropriate review of systems was performed with pertinent positives and  negatives noted in the HPI, and all other systems negative.    Physical Exam   BP: 136/86  Pulse: 84  Temp: 98.1  F (36.7  C)  Resp: 16  SpO2: 99 %  Physical Exam  General: Afebrile, no acute distress   HEENT: Normocephalic, atraumatic, conjunctivae normal. MMM  Neck: non-tender, supple  Cardio: regular rate. regular rhythm   Resp: Normal work of breathing, no respiratory distress, lungs clear bilaterally, no wheezing, rhonchi, rales  Chest/Back: no visual signs of trauma, no CVA tenderness   Abdomen: soft, non distension, mild diffuse TTP with no rebound, no guarding, no peritoneal signs   Neuro: alert and fully oriented. CN II-XII grossly intact. Grossly normal strength and sensation in all extremities.   MSK: +b/l LE pitting edema no deformities. Normal range of motion  Integumentary/Skin: no rash visualized, normal color  Psych: normal affect, normal behavior      ED Course, Procedures, & Data      Procedures  .Interpreted by Sarah Pettit MD  Time reviewed: 0614  Symptoms at time of EKG: chest pain   Rhythm: normal sinus   Rate: normal  Axis: normal  Ectopy: none  Conduction: normal  ST Segments/ T Waves: No acute ischemic change  Q Waves: none  Comparison to prior: No significant change     Clinical Impression: normal sinus rhythm with no acute ischemic change     Results for orders placed or performed during the hospital encounter of 04/30/25   [place] Draw     Status: None (In process)    Narrative    The following orders were created for panel order [place] Draw.  Procedure                               Abnormality         Status                     ---------                               -----------         ------                     Extra Blue Top Tube[[id number]]                             In process                 Extra Red Top Tube[[id number]]                              In process                 Extra Green Top (Lithiu...[[id number]]                      In process                 Extra  Purple Top Tube[[id number]]                           In process                   Please view results for these tests on the individual orders.   Lipase     Status: Abnormal   Result Value Ref Range    Lipase 7 (L) 13 - 60 U/L   Troponin T, High Sensitivity     Status: Normal   Result Value Ref Range    Troponin T, High Sensitivity <6 <=14 ng/L   Nt probnp inpatient (BNP)     Status: Normal   Result Value Ref Range    N terminal Pro BNP Inpatient 205 0 - 900 pg/mL   Magnesium     Status: Normal   Result Value Ref Range    Magnesium 1.9 1.7 - 2.3 mg/dL   UA with Microscopic reflex to Culture     Status: Abnormal    Specimen: Urine, Clean Catch   Result Value Ref Range    Color Urine Light Yellow Colorless, Straw, Light Yellow, Yellow    Appearance Urine Clear Clear    Glucose Urine >=1000 (A) Negative mg/dL    Bilirubin Urine Negative Negative    Ketones Urine Negative Negative mg/dL    Specific Gravity Urine 1.034 1.003 - 1.035    Blood Urine Negative Negative    pH Urine 7.0 5.0 - 7.0    Protein Albumin Urine Negative Negative mg/dL    Urobilinogen Urine Normal Normal mg/dL    Nitrite Urine Negative Negative    Leukocyte Esterase Urine Negative Negative    RBC Urine 1 <=2 /HPF    WBC Urine 2 <=5 /HPF    Squamous Epithelials Urine <1 <=1 /HPF    Narrative    Urine Culture not indicated   CBC with platelets and differential     Status: Abnormal   Result Value Ref Range    WBC Count 5.9 4.0 - 11.0 10e3/uL    RBC Count 3.36 (L) 3.80 - 5.20 10e6/uL    Hemoglobin 10.5 (L) 11.7 - 15.7 g/dL    Hematocrit 30.7 (L) 35.0 - 47.0 %    MCV 91 78 - 100 fL    MCH 31.3 26.5 - 33.0 pg    MCHC 34.2 31.5 - 36.5 g/dL    RDW 13.1 10.0 - 15.0 %    Platelet Count 574 (H) 150 - 450 10e3/uL    % Neutrophils 64 %    % Lymphocytes 24 %    % Monocytes 9 %    % Eosinophils 2 %    % Basophils 1 %    % Immature Granulocytes 0 %    NRBCs per 100 WBC 0 <1 /100    Absolute Neutrophils 3.8 1.6 - 8.3 10e3/uL    Absolute Lymphocytes 1.4 0.8 - 5.3  10e3/uL    Absolute Monocytes 0.5 0.0 - 1.3 10e3/uL    Absolute Eosinophils 0.1 0.0 - 0.7 10e3/uL    Absolute Basophils 0.1 0.0 - 0.2 10e3/uL    Absolute Immature Granulocytes 0.0 <=0.4 10e3/uL    Absolute NRBCs 0.0 10e3/uL   EKG 12-lead, tracing only     Status: None (Preliminary result)   Result Value Ref Range    Systolic Blood Pressure  mmHg    Diastolic Blood Pressure  mmHg    Ventricular Rate 75 BPM    Atrial Rate 75 BPM    FL Interval 162 ms    QRS Duration 72 ms     ms    QTc 431 ms    P Axis 65 degrees    R AXIS 48 degrees    T Axis 57 degrees    Interpretation ECG Sinus rhythm  Normal ECG      CBC with platelets differential     Status: Abnormal    Narrative    The following orders were created for panel order CBC with platelets differential.  Procedure                               Abnormality         Status                     ---------                               -----------         ------                     CBC with platelets and ...[[id number]]  Abnormal            Final result                 Please view results for these tests on the individual orders.     Medications - No data to display  Labs Ordered and Resulted from Time of ED Arrival to Time of ED Departure   LIPASE - Abnormal       Result Value    Lipase 7 (*)    ROUTINE UA WITH MICROSCOPIC REFLEX TO CULTURE - Abnormal    Color Urine Light Yellow      Appearance Urine Clear      Glucose Urine >=1000 (*)     Bilirubin Urine Negative      Ketones Urine Negative      Specific Gravity Urine 1.034      Blood Urine Negative      pH Urine 7.0      Protein Albumin Urine Negative      Urobilinogen Urine Normal      Nitrite Urine Negative      Leukocyte Esterase Urine Negative      RBC Urine 1      WBC Urine 2      Squamous Epithelials Urine <1     CBC WITH PLATELETS AND DIFFERENTIAL - Abnormal    WBC Count 5.9      RBC Count 3.36 (*)     Hemoglobin 10.5 (*)     Hematocrit 30.7 (*)     MCV 91      MCH 31.3      MCHC 34.2      RDW 13.1       Platelet Count 574 (*)     % Neutrophils 64      % Lymphocytes 24      % Monocytes 9      % Eosinophils 2      % Basophils 1      % Immature Granulocytes 0      NRBCs per 100 WBC 0      Absolute Neutrophils 3.8      Absolute Lymphocytes 1.4      Absolute Monocytes 0.5      Absolute Eosinophils 0.1      Absolute Basophils 0.1      Absolute Immature Granulocytes 0.0      Absolute NRBCs 0.0     TROPONIN T, HIGH SENSITIVITY - Normal    Troponin T, High Sensitivity <6     NT PROBNP INPATIENT - Normal    N terminal Pro BNP Inpatient 205     MAGNESIUM - Normal    Magnesium 1.9     COMPREHENSIVE METABOLIC PANEL     US Lower Extremity Venous Duplex Bilateral    (Results Pending)   CT Abdomen Pelvis w Contrast    (Results Pending)          Critical care was not performed.     Medical Decision Making  The patient's presentation was of high complexity (an acute health issue posing potential threat to life or bodily function).    The patient's evaluation involved:  review of external note(s) from 2 sources (ED note, recent hospital admission notes)  ordering and/or review of 3+ test(s) in this encounter (see separate area of note for details)  independent interpretation of testing performed by another health professional (US, CT)  discussion of management or test interpretation with another health professional (morning provider)    The patient's management necessitated moderate risk (IV contrast administration), high risk (a decision regarding hospitalization), and further care after sign-out to morning provider (see their note for further management).    Assessment & Plan    Chantell Kidd is a 59 year old female who with past medical history of migraines, chronic pancreatitis, post pancreatectomy diabetes, GERD, hypothyroidism, depression, PEG-GJ placed on 4/19/25 who presents to the emergency department for evaluation of bilateral lower extremity edema along with abdominal pain.  Patient is nontoxic-appearing, afebrile, in  distress.  Patient hemodynamically stable vital signs within normal limits.    I reviewed EKG which demonstrates normal sinus rhythm with ventricular to 75 bpm, normal axis, QTc 431, no acute ischemic change, no significant change when compared to prior EKG.  Labs remarkable for no leukocytosis white blood cell count 5.9, hemoglobin 10.5, urinalysis with no evidence of acute infection, normal lipase, , initial high sensitive troponin less than 6, magnesium 1.9.    Patient signed out to morning provider pending CMP, bilateral lower extremity ultrasound, CT scan of the abdomen pelvis, along with reevaluation, final disposition.    I have reviewed the nursing notes. I have reviewed the findings, diagnosis, plan and need for follow up with the patient.    New Prescriptions    No medications on file       Final diagnoses:   Peripheral edema   Generalized abdominal pain       Sarah Pettit MD  [facility] EMERGENCY DEPARTMENT  4/30/2025     Sarah Pettit MD  04/30/25 9800

## 2025-04-30 NOTE — CONSULTS
GASTROENTEROLOGY CONSULTATION      Date of Admission:  4/30/2025  Reason for Admission: Leg swelling and abdominal pain  Date of Consult  4/30/2025   Requesting Physician:  Nish Limon MD         ASSESSMENT AND RECOMMENDATIONS:     59 year old female with a history of TPIAT (chronic pancreatitis), PEG-J dependence for gastric decompression and enteral nutrition, with recent coiled jejunal extension and replaced 4/17. GI AE now consulted for replacement of PEG-J as it appears to be coiled in the stomach again.    #. PEG-J tube displacement, recurrent  #. Abdominal pain   #. History of TPAIT (2012)  Presents with abdominal pain with evidence of coiled jejunal extension. Exchanged for same indication on 4/17. Since then time she has had increased abdominal bloating and worsening leakage around the tube. Has been vomiting as well. Previously discussed separate PEG and PEJ tubes with recurrent coiling (which unfortunately happens frequently in TPIAT anatomy). Would recommend holding tube feeds for now, good wound cares at the site and will work on scheduling replacement of PEG-J to PEG + new PEJ later this week in the OR.    Recommendations:  -- Plan for exchange of PEG-J to PEG + new PEJ placement   -- Tentatively planning Friday 5/2  -- Goal Plt >50, INR <1.8, hgb >7.0  -- If replacement delayed much longer than 2 days may need to start PPN in the meantime  -- Gastric port to gravity  -- Keep ostomy site clean and dry  -- Hold tube feeds for now  -- RD consulted  -- Remainder of management per primary team      Overall time spent on the date of this encounter preparing to see the patient (including chart review of available notes, clinical status events, imaging and labs); examining the patient, obtaining history; coordinating and/or ordering medications, tests and/or procedures; communicating with other health care professionals; and documenting the above clinical information in the electronic medical  record was 65 minutes.    Thank you for involving us in this patient's care. Please do not hesitate to contact the GI service with any questions or concerns.     Pt seen and care plan discussed with Dr. Perdomo, GI staff physician.    Kim Mccloud PA-C, [facility]  Advanced Endoscopy/Pancreaticobiliary GI Service  [facility]  Vocera   =======================================================================           Reason for Consultation:        Pt with recent PEG-J replacement on 4/17, now endorsing leakage of fluids via G tube on abdomen after PO intake. Please evaluate for dislocated PEG-J.              History of Present Illness:   Patient seen and examined at 1415. History is obtained from the patient in the ED. RN at bedside.    Indication: gastric decompression and enteral nutrition  Recent imaging of the abdomen: CT Abdomen Pelvis w Contrast (04/30/2025 8:53 AM)   Current route of nutrition: jejunal feeds + po               Past Surgical History:   Procedure Laterality Date    ARTHROPLASTY CARPOMETACARPAL (THUMB JOINT)  05/02/2014    Procedure: ARTHROPLASTY CARPOMETACARPAL (THUMB JOINT);  Surgeon: Carina Panda MD;  Location: [facility] OR    CHOLECYSTECTOMY  01/01/2004    COLONOSCOPY  07/18/2014    Procedure: COLONOSCOPY;  Surgeon: Aurora Sahu MD;  Location:  GI    COLONOSCOPY N/A 08/01/2017    Procedure: COLONOSCOPY;  Colonoscopy and upper endoscopy;  Surgeon: Deirdre Harris MD;  Location: [facility]    ENDOSCOPIC RETROGRADE CHOLANGIOPANCREATOGRAM      ENDOSCOPIC RETROGRADE CHOLANGIOPANCREATOGRAM  04/19/2011    Procedure:ENDOSCOPIC RETROGRADE CHOLANGIOPANCREATOGRAM; Pancreatic Stent Placement      ENDOSCOPIC RETROGRADE CHOLANGIOPANCREATOGRAM  05/26/2011    Procedure:ENDOSCOPIC RETROGRADE CHOLANGIOPANCREATOGRAM; with Pancreatic Stent Removal; Surgeon:DALE MIMS; Location: OR    ENDOSCOPY UPPER, COLONOSCOPY, COMBINED  04/25/2012     Procedure:COMBINED ENDOSCOPY UPPER, COLONOSCOPY; Enteroscopy with Bile Duct Stent Removal, Colonoscopy  *Latex Safe Room*; Surgeon:GRACY GODWIN; Location:[facility] OR    ESOPHAGOSCOPY, GASTROSCOPY, DUODENOSCOPY (EGD), COMBINED  05/26/2011    Procedure:COMBINED ESOPHAGOSCOPY, GASTROSCOPY, DUODENOSCOPY (EGD); Surgeon:DALE MIMS; Location:[facility]    ESOPHAGOSCOPY, GASTROSCOPY, DUODENOSCOPY (EGD), COMBINED N/A 10/30/2014    Procedure: COMBINED ESOPHAGOSCOPY, GASTROSCOPY, DUODENOSCOPY (EGD), BIOPSY SINGLE OR MULTIPLE;  Surgeon: Sarai Moon MD;  [facility]    ESOPHAGOSCOPY, GASTROSCOPY, DUODENOSCOPY (EGD), COMBINED Left 07/06/2015    Procedure: COMBINED ESOPHAGOSCOPY, GASTROSCOPY, DUODENOSCOPY (EGD), BIOPSY SINGLE OR MULTIPLE;  Surgeon: Thomas Estrada MD;  [facility]    ESOPHAGOSCOPY, GASTROSCOPY, DUODENOSCOPY (EGD), COMBINED N/A 07/08/2016    Procedure: COMBINED ESOPHAGOSCOPY, GASTROSCOPY, DUODENOSCOPY (EGD), BIOPSY SINGLE OR MULTIPLE;  Surgeon: Eloy Klein MD;  Location:  GI    ESOPHAGOSCOPY, GASTROSCOPY, DUODENOSCOPY (EGD), COMBINED N/A 08/04/2016    Procedure: COMBINED ESOPHAGOSCOPY, GASTROSCOPY, DUODENOSCOPY (EGD), BIOPSY SINGLE OR MULTIPLE;  Surgeon: Jason Brown MD;  [facility]    ESOPHAGOSCOPY, GASTROSCOPY, DUODENOSCOPY (EGD), COMBINED N/A 08/01/2017    Procedure: COMBINED ESOPHAGOSCOPY, GASTROSCOPY, DUODENOSCOPY (EGD);;  Surgeon: Deirdre Harris MD;  [facility]    ESOPHAGOSCOPY, GASTROSCOPY, DUODENOSCOPY (EGD), COMBINED N/A 06/12/2019    Procedure: ESOPHAGOGASTRODUODENOSCOPY (EGD);  Surgeon: Jose Francisco Perdomo MD;  Location:  GI    GYN SURGERY      Hysterectomy and USO    [facility] ENDOSCOPY W EUS  07/20/2011    Procedure:COMBINED ENDOSCOPIC ULTRASOUND, ESOPHAGOSCOPY, GASTROSCOPY, DUODENOSCOPY (EGD); Surgeon:ADRVIN DONOHUE; Location:[facility]    HERNIORRHAPHY VENTRAL N/A 09/15/2016    Procedure: HERNIORRHAPHY VENTRAL;   Surgeon: Juanita Bernabe MD;  [facility]    HYSTERECTOMY  1997 or 1998    USO    INCISION AND DRAINAGE ABDOMEN WASHOUT, COMBINED  08/16/2012    Procedure: COMBINED INCISION AND DRAINAGE ABDOMEN WASHOUT;  ,debridement and Drainage Post Appendectomy;  Surgeon: Ron Austin MD;  [facility]    INJECT TRANSVERSUS ABDOMINIS PLANE (TAP) BLOCK BILATERAL Bilateral 05/26/2016    Procedure: INJECT TRANSVERSUS ABDOMINIS PLANE (TAP) BLOCK BILATERAL;  Surgeon: Leonard Mccallum MD;  [facility]    IR NG TUBE PLACEMENT REQ RAD & FLUORO  12/04/2017    LAPAROSCOPIC APPENDECTOMY  07/30/2012    Procedure: LAPAROSCOPIC APPENDECTOMY;  Open Appendectomy;  Surgeon: Ron Austin MD;  [facility]    PANCREATECTOMY, TRANSPLANT AUTO ISLET CELL, COMBINED  01/06/2012    Procedure:COMBINED PANCREATECTOMY, TRANSPLANT AUTO ISLET CELL; Total  Pancreatectomy, Auto Islet Transplant, splenectomy, 18fr. transgastric-jejunal feeding tube placement, liver biopsy; Surgeon:PALAK LEE; Location:[facility]    PICC DOUBLE LUMEN PLACEMENT Right 04/19/2025    5FR DL PICC, brachial medial vein. L-38cm, 3cm out.    PICC INSERTION - DOUBLE LUMEN Right 04/30/2025    39-3cm, Medial brachial vein    REPLACE GASTROSTOMY TUBE, PERCUTANEOUS N/A 08/30/2017    Procedure: REPLACE GASTROSTOMY TUBE, PERCUTANEOUS;  GJ Tube Change;  Surgeon: Jose Nath PA-C;  [facility]    SPLENECTOMY                Physical Exam:   Temp: 98.7  F (37.1  C) Temp src: Oral BP: 137/83 Pulse: 78   Resp: 18 SpO2: 99 % O2 Device: None (Room air)    Wt:   Wt Readings from Last 2 Encounters:   04/29/25 47.2 kg (104 lb)   04/21/25 50.4 kg (111 lb 1.6 oz)        General: Thin, pleasant female in NAD.  Answers appropriately.    Oropharynx is clear, moist and w/o exudate or lesions.  Chest: breathing comfortably on RA  Abdomen: Soft, non-tender, non-distended.  Left sided PEG-J with gastric port hooked up to gravity bag with water/tube  feeds in gravity bag, jejunal port with tube feeds hooked up to backpack feeding pump (but not currently running)  Neurologic: Grossly non-focal.  CN 2-12 grossly intact.            Data:   Labs and imaging below were independently reviewed and interpreted    LAB WORK:    BMP  Recent Labs   Lab 04/30/25  1246 04/30/25  1126 04/30/25  1030 04/30/25  0908 04/30/25  0530 04/24/25  0857 04/24/25  0700   NA  --   --   --   --  131*  --  139   POTASSIUM  --   --   --   --  4.2  --  4.6   CHLORIDE  --   --   --   --  91*  --  104   ELIZA  --   --   --   --  9.1  --  8.4*   CO2  --   --   --   --  25  --  27   BUN  --   --   --   --  6.3*  --  20.2   CR  --   --   --   --  0.38*  --  0.43*   * 393* 483* >600* 747*   < > 266*    < > = values in this interval not displayed.     CBC  Recent Labs   Lab 04/30/25  0530 04/24/25  0700   WBC 5.9 6.7   RBC 3.36* 2.97*   HGB 10.5* 9.3*   HCT 30.7* 28.2*   MCV 91 95   MCH 31.3 31.3   MCHC 34.2 33.0   RDW 13.1 14.6   * 443     INRNo lab results found in last 7 days.  Albumin  Recent Labs   Lab 04/30/25  0530   ALBUMIN 3.8          IMAGING:  CT Abdomen Pelvis w Contrast (04/30/2025 8:53 AM)

## 2025-04-30 NOTE — PROCEDURES
[facility]    Double Lumen PICC Placement    Date/Time: 4/30/2025 1:01 PM    Performed by: Ezequiel Schuster RN  Authorized by: Celia Beatty MD  Indications: vascular access      UNIVERSAL PROTOCOL   Site Marked: Yes  Prior Images Obtained and Reviewed:  Yes  Required items: Required blood products, implants, devices and special equipment available    Patient identity confirmed:  Verbally with patient, hospital-assigned identification number, arm band and provided demographic data  Patient was reevaluated immediately before administering moderate or deep sedation or anesthesia  Confirmation Checklist:  Patient's identity using two indicators, procedure was appropriate and matched the consent or emergent situation, correct equipment/implants were available and relevant allergies  Time out: Immediately prior to the procedure a time out was called    Universal Protocol: the [facility] Universal Protocol was followed    Preparation: Patient was prepped and draped in usual sterile fashion       ANESTHESIA    Anesthesia:  See MAR for details  Local Anesthetic:  Lidocaine 1% without epinephrine  Anesthetic Total (mL):  1      SEDATION    Patient Sedated: No        Preparation: skin prepped with ChloraPrep  Skin prep agent: skin prep agent completely dried prior to procedure  Sterile barriers: maximum sterile barriers were used: cap, mask, sterile gown, sterile gloves, and large sterile sheet  Hand hygiene: hand hygiene performed prior to central venous catheter insertion  Type of line used: PICC  Catheter type: double lumen  Lumen type: non-valved and power PICC  Lumen Identification: Purple and Red  Catheter size: 5 Fr  Brand: Bard  Lot number: [id number]  Placement method: venipuncture, MST, ultrasound and tip navigation system  Number of attempts: 1  Difficulty threading catheter: no  Successful placement: yes  Orientation: right  Catheter to Vein (%):  39  Location: brachial vein (medial)  Tip Location: SVC  Arm circumference: adults 10 cm  Extremity circumference: 18  Visible catheter length: 3  Total catheter length: 39  Dressing and securement: alcohol impregnated caps, chlorhexidine disc applied, transparent dressing, sterile dressing applied, statlock and site cleansed  Post procedure assessment: blood return through all ports, free fluid flow and placement verified by 3CG technology  PROCEDURE Describe Procedure: Patient tolerated well and denied pain immediately after procedure.  PICC tip is in satisfactory location as verified by PROSimity 3CG Tip Confirmation System. PICC is OK to use.  Disposal: sharps and needle count correct at the end of procedure, needles and guidewire disposed in sharps container  Patient Tolerance:  Patient tolerated the procedure well with no immediate complications

## 2025-04-30 NOTE — PHARMACY-CONSULT NOTE
Pharmacy Tube Feeding Consult    Medication reviewed for administration by feeding tube and for potential food/drug interactions.    Recommendation: No changes are needed at this time, if patient requiring medication administration via feeding tube instead of orally, will  need to change pantoprazole to suspension and change potassium to solution    Pharmacy will continue to follow as new medications are ordered.    Alfredito Park, PharmD, BCPS

## 2025-05-01 ENCOUNTER — APPOINTMENT (OUTPATIENT)
Dept: OCCUPATIONAL THERAPY | Facility: CLINIC | Age: 60
End: 2025-05-01
Payer: MEDICARE

## 2025-05-01 ENCOUNTER — DOCUMENTATION ONLY (OUTPATIENT)
Dept: INTERNAL MEDICINE | Facility: CLINIC | Age: 60
End: 2025-05-01
Payer: MEDICARE

## 2025-05-01 VITALS
RESPIRATION RATE: 14 BRPM | DIASTOLIC BLOOD PRESSURE: 60 MMHG | HEART RATE: 79 BPM | TEMPERATURE: 98.2 F | SYSTOLIC BLOOD PRESSURE: 99 MMHG | OXYGEN SATURATION: 97 %

## 2025-05-01 LAB
ALBUMIN SERPL BCG-MCNC: 3.5 G/DL (ref 3.5–5.2)
ALP SERPL-CCNC: 124 U/L (ref 40–150)
ALT SERPL W P-5'-P-CCNC: 78 U/L (ref 0–50)
ANION GAP SERPL CALCULATED.3IONS-SCNC: 9 MMOL/L (ref 7–15)
AST SERPL W P-5'-P-CCNC: 66 U/L (ref 0–45)
BILIRUB SERPL-MCNC: 0.4 MG/DL
BUN SERPL-MCNC: 5.5 MG/DL (ref 8–23)
CALCIUM SERPL-MCNC: 8.6 MG/DL (ref 8.8–10.4)
CHLORIDE SERPL-SCNC: 100 MMOL/L (ref 98–107)
CREAT SERPL-MCNC: 0.38 MG/DL (ref 0.51–0.95)
EGFRCR SERPLBLD CKD-EPI 2021: >90 ML/MIN/1.73M2
ERYTHROCYTE [DISTWIDTH] IN BLOOD BY AUTOMATED COUNT: 13.2 % (ref 10–15)
GLUCOSE BLDC GLUCOMTR-MCNC: 140 MG/DL (ref 70–99)
GLUCOSE BLDC GLUCOMTR-MCNC: 189 MG/DL (ref 70–99)
GLUCOSE BLDC GLUCOMTR-MCNC: 233 MG/DL (ref 70–99)
GLUCOSE BLDC GLUCOMTR-MCNC: 349 MG/DL (ref 70–99)
GLUCOSE BLDC GLUCOMTR-MCNC: 374 MG/DL (ref 70–99)
GLUCOSE SERPL-MCNC: 236 MG/DL (ref 70–99)
HCO3 SERPL-SCNC: 27 MMOL/L (ref 22–29)
HCT VFR BLD AUTO: 34.9 % (ref 35–47)
HGB BLD-MCNC: 11.7 G/DL (ref 11.7–15.7)
INR PPP: 0.92 (ref 0.85–1.15)
MCH RBC QN AUTO: 31.5 PG (ref 26.5–33)
MCHC RBC AUTO-ENTMCNC: 33.5 G/DL (ref 31.5–36.5)
MCV RBC AUTO: 94 FL (ref 78–100)
PLATELET # BLD AUTO: 587 10E3/UL (ref 150–450)
POTASSIUM SERPL-SCNC: 3.8 MMOL/L (ref 3.4–5.3)
PROT SERPL-MCNC: 5.8 G/DL (ref 6.4–8.3)
PROTHROMBIN TIME: 12.7 SECONDS (ref 11.8–14.8)
RBC # BLD AUTO: 3.72 10E6/UL (ref 3.8–5.2)
SODIUM SERPL-SCNC: 136 MMOL/L (ref 135–145)
WBC # BLD AUTO: 7.8 10E3/UL (ref 4–11)

## 2025-05-01 PROCEDURE — 85610 PROTHROMBIN TIME: CPT

## 2025-05-01 PROCEDURE — 85041 AUTOMATED RBC COUNT: CPT

## 2025-05-01 PROCEDURE — 250N000011 HC RX IP 250 OP 636

## 2025-05-01 PROCEDURE — 120N000002 HC R&B MED SURG/OB UMMC

## 2025-05-01 PROCEDURE — 82962 GLUCOSE BLOOD TEST: CPT

## 2025-05-01 PROCEDURE — B4035 ENTERAL FEED SUPP PUMP PER D: HCPCS

## 2025-05-01 PROCEDURE — 36415 COLL VENOUS BLD VENIPUNCTURE: CPT

## 2025-05-01 PROCEDURE — 97165 OT EVAL LOW COMPLEX 30 MIN: CPT | Mod: GO

## 2025-05-01 PROCEDURE — 99232 SBSQ HOSP IP/OBS MODERATE 35: CPT

## 2025-05-01 PROCEDURE — 250N000013 HC RX MED GY IP 250 OP 250 PS 637

## 2025-05-01 PROCEDURE — S9342 HIT ENTERAL PUMP DIEM: HCPCS

## 2025-05-01 PROCEDURE — 80053 COMPREHEN METABOLIC PANEL: CPT

## 2025-05-01 PROCEDURE — 99233 SBSQ HOSP IP/OBS HIGH 50: CPT | Mod: GC | Performed by: STUDENT IN AN ORGANIZED HEALTH CARE EDUCATION/TRAINING PROGRAM

## 2025-05-01 PROCEDURE — 250N000011 HC RX IP 250 OP 636: Performed by: INTERNAL MEDICINE

## 2025-05-01 PROCEDURE — 250N000013 HC RX MED GY IP 250 OP 250 PS 637: Performed by: STUDENT IN AN ORGANIZED HEALTH CARE EDUCATION/TRAINING PROGRAM

## 2025-05-01 PROCEDURE — 97535 SELF CARE MNGMENT TRAINING: CPT | Mod: GO

## 2025-05-01 PROCEDURE — 250N000012 HC RX MED GY IP 250 OP 636 PS 637

## 2025-05-01 RX ORDER — NALOXONE HYDROCHLORIDE 0.4 MG/ML
0.2 INJECTION, SOLUTION INTRAMUSCULAR; INTRAVENOUS; SUBCUTANEOUS
Status: DISCONTINUED | OUTPATIENT
Start: 2025-05-01 | End: 2025-05-06 | Stop reason: HOSPADM

## 2025-05-01 RX ORDER — OXYCODONE HYDROCHLORIDE 10 MG/1
10 TABLET ORAL EVERY 4 HOURS PRN
Status: DISCONTINUED | OUTPATIENT
Start: 2025-05-01 | End: 2025-05-02

## 2025-05-01 RX ORDER — PANTOPRAZOLE SODIUM 40 MG/1
40 TABLET, DELAYED RELEASE ORAL
Status: DISCONTINUED | OUTPATIENT
Start: 2025-05-01 | End: 2025-05-06 | Stop reason: HOSPADM

## 2025-05-01 RX ORDER — POLYETHYLENE GLYCOL 3350 17 G/17G
17 POWDER, FOR SOLUTION ORAL 3 TIMES DAILY
Status: DISCONTINUED | OUTPATIENT
Start: 2025-05-01 | End: 2025-05-06 | Stop reason: HOSPADM

## 2025-05-01 RX ORDER — NALOXONE HYDROCHLORIDE 0.4 MG/ML
0.4 INJECTION, SOLUTION INTRAMUSCULAR; INTRAVENOUS; SUBCUTANEOUS
Status: DISCONTINUED | OUTPATIENT
Start: 2025-05-01 | End: 2025-05-06 | Stop reason: HOSPADM

## 2025-05-01 RX ORDER — OXYCODONE HYDROCHLORIDE 10 MG/1
10 TABLET ORAL ONCE
Status: COMPLETED | OUTPATIENT
Start: 2025-05-01 | End: 2025-05-01

## 2025-05-01 RX ADMIN — GABAPENTIN 100 MG: 100 CAPSULE ORAL at 07:50

## 2025-05-01 RX ADMIN — CYCLOBENZAPRINE HYDROCHLORIDE 5 MG: 5 TABLET, FILM COATED ORAL at 20:28

## 2025-05-01 RX ADMIN — INSULIN ASPART 9 UNITS: 100 INJECTION, SOLUTION INTRAVENOUS; SUBCUTANEOUS at 13:12

## 2025-05-01 RX ADMIN — PANCRELIPASE 5 CAPSULE: 60000; 12000; 38000 CAPSULE, DELAYED RELEASE PELLETS ORAL at 22:06

## 2025-05-01 RX ADMIN — INSULIN ASPART 1 UNITS: 100 INJECTION, SOLUTION INTRAVENOUS; SUBCUTANEOUS at 07:51

## 2025-05-01 RX ADMIN — LINACLOTIDE 145 MCG: 145 CAPSULE, GELATIN COATED ORAL at 07:49

## 2025-05-01 RX ADMIN — SUCRALFATE 1 G: 1 TABLET ORAL at 07:48

## 2025-05-01 RX ADMIN — ONDANSETRON HYDROCHLORIDE 4 MG: 4 TABLET, FILM COATED ORAL at 03:21

## 2025-05-01 RX ADMIN — OXYCODONE HYDROCHLORIDE 5 MG: 5 TABLET ORAL at 08:01

## 2025-05-01 RX ADMIN — ACETAMINOPHEN 975 MG: 325 TABLET ORAL at 22:19

## 2025-05-01 RX ADMIN — POTASSIUM CHLORIDE 20 MEQ: 750 TABLET, EXTENDED RELEASE ORAL at 07:51

## 2025-05-01 RX ADMIN — OXYCODONE HYDROCHLORIDE 5 MG: 5 TABLET ORAL at 03:21

## 2025-05-01 RX ADMIN — GABAPENTIN 100 MG: 100 CAPSULE ORAL at 20:19

## 2025-05-01 RX ADMIN — PANTOPRAZOLE SODIUM 40 MG: 40 TABLET, DELAYED RELEASE ORAL at 07:50

## 2025-05-01 RX ADMIN — FAMOTIDINE 20 MG: 20 TABLET, FILM COATED ORAL at 22:19

## 2025-05-01 RX ADMIN — HYDROCORTISONE 15 MG: 10 TABLET ORAL at 22:06

## 2025-05-01 RX ADMIN — THIAMINE HCL TAB 100 MG 100 MG: 100 TAB at 07:50

## 2025-05-01 RX ADMIN — INSULIN ASPART 3 UNITS: 100 INJECTION, SOLUTION INTRAVENOUS; SUBCUTANEOUS at 22:10

## 2025-05-01 RX ADMIN — DULOXETINE 60 MG: 60 CAPSULE, DELAYED RELEASE ORAL at 07:47

## 2025-05-01 RX ADMIN — DICYCLOMINE HYDROCHLORIDE 10 MG: 10 CAPSULE ORAL at 10:58

## 2025-05-01 RX ADMIN — DRONABINOL 5 MG: 5 CAPSULE ORAL at 07:50

## 2025-05-01 RX ADMIN — DICYCLOMINE HYDROCHLORIDE 10 MG: 10 CAPSULE ORAL at 05:26

## 2025-05-01 RX ADMIN — ONDANSETRON HYDROCHLORIDE 4 MG: 4 TABLET, FILM COATED ORAL at 08:01

## 2025-05-01 RX ADMIN — TOPIRAMATE 100 MG: 100 TABLET, FILM COATED ORAL at 10:58

## 2025-05-01 RX ADMIN — HYDROCORTISONE 10 MG: 10 TABLET ORAL at 07:48

## 2025-05-01 RX ADMIN — INSULIN ASPART 7 UNITS: 100 INJECTION, SOLUTION INTRAVENOUS; SUBCUTANEOUS at 20:10

## 2025-05-01 RX ADMIN — DRONABINOL 5 MG: 5 CAPSULE ORAL at 20:19

## 2025-05-01 RX ADMIN — METOCLOPRAMIDE 10 MG: 10 TABLET ORAL at 20:28

## 2025-05-01 RX ADMIN — OXYCODONE HYDROCHLORIDE 10 MG: 10 TABLET ORAL at 16:42

## 2025-05-01 RX ADMIN — PANCRELIPASE 5 CAPSULE: 60000; 12000; 38000 CAPSULE, DELAYED RELEASE PELLETS ORAL at 12:44

## 2025-05-01 RX ADMIN — LEVOTHYROXINE SODIUM 125 MCG: 125 TABLET ORAL at 07:49

## 2025-05-01 RX ADMIN — OXYCODONE HYDROCHLORIDE 10 MG: 10 TABLET ORAL at 23:31

## 2025-05-01 RX ADMIN — OXYCODONE HYDROCHLORIDE 10 MG: 10 TABLET ORAL at 20:28

## 2025-05-01 RX ADMIN — METOCLOPRAMIDE 10 MG: 10 TABLET ORAL at 07:49

## 2025-05-01 RX ADMIN — INSULIN ASPART 2 UNITS: 100 INJECTION, SOLUTION INTRAVENOUS; SUBCUTANEOUS at 11:16

## 2025-05-01 RX ADMIN — Medication 15 ML: at 07:45

## 2025-05-01 RX ADMIN — DULOXETINE HYDROCHLORIDE 30 MG: 30 CAPSULE, DELAYED RELEASE ORAL at 07:52

## 2025-05-01 RX ADMIN — GABAPENTIN 100 MG: 100 CAPSULE ORAL at 13:45

## 2025-05-01 RX ADMIN — PANTOPRAZOLE SODIUM 40 MG: 40 TABLET, DELAYED RELEASE ORAL at 23:31

## 2025-05-01 RX ADMIN — Medication 10 ML: at 12:45

## 2025-05-01 RX ADMIN — ACETAMINOPHEN 975 MG: 325 TABLET ORAL at 05:26

## 2025-05-01 RX ADMIN — POLYETHYLENE GLYCOL 3350 17 G: 17 POWDER, FOR SOLUTION ORAL at 19:44

## 2025-05-01 RX ADMIN — TOPIRAMATE 100 MG: 100 TABLET, FILM COATED ORAL at 20:19

## 2025-05-01 RX ADMIN — ACETAMINOPHEN 975 MG: 325 TABLET ORAL at 13:44

## 2025-05-01 RX ADMIN — METOCLOPRAMIDE 10 MG: 10 TABLET ORAL at 13:45

## 2025-05-01 RX ADMIN — SUCRALFATE 1 G: 1 TABLET ORAL at 22:24

## 2025-05-01 RX ADMIN — FUROSEMIDE 40 MG: 40 TABLET ORAL at 07:50

## 2025-05-01 RX ADMIN — SUCRALFATE 1 G: 1 TABLET ORAL at 20:20

## 2025-05-01 RX ADMIN — SUCRALFATE 1 G: 1 TABLET ORAL at 11:16

## 2025-05-01 ASSESSMENT — ACTIVITIES OF DAILY LIVING (ADL)
ADLS_ACUITY_SCORE: 61
ADLS_ACUITY_SCORE: 59
ADLS_ACUITY_SCORE: 61
ADLS_ACUITY_SCORE: 59
ADLS_ACUITY_SCORE: 59
ADLS_ACUITY_SCORE: 61
ADLS_ACUITY_SCORE: 61
ADLS_ACUITY_SCORE: 59
ADLS_ACUITY_SCORE: 61

## 2025-05-01 NOTE — PROGRESS NOTES
"   05/01/25 1100   Appointment Info   Signing Clinician's Name / Credentials (OT) Gavi Santiago, OTD, OTR/L   Rehab Comments (OT) edema eval   Quick Adds   Quick Adds Edema   Sensory   Sensory Comments WFL   Edema General Information   Onset of Edema 04/25/25  (acute- began just prior to admit date)   Affected Body Part(s) Left LE;Right LE   Etiology Comments dec muscle pump activation   Edema Precaution Comments US negative for DVT in BLE   General Comments/Previous Edema Treatment/Edema Equipment endorses no consistent baseline edema but occasional bouts. pt states she had compression socks on over the weekend but they were too painful and her LE's were \"4x the size they are now\".   Edema Examination/Assessment   Skin Condition Pitting;Intact;Dryness   Scar No   Ulcerations No   Pitting Assessment pt has pockets of 1+ pitting along calves/medial LE's, trace edema in feet/ankles.   Clinical Impression   Criteria for Skilled Therapeutic Interventions Met (OT) Yes, treatment indicated   OT Diagnosis inc edema impacting participation in ADLs and comfort   Edema: Patient Presentation Edema   Edema: Planned Interventions Fit for compression garment;Edema exercises;Precautions to prevent infection/exacerbation;Manual therapy;ADL training   Clinical Decision Making Complexity (OT) problem focused assessment/low complexity   Risk & Benefits of therapy have been explained evaluation/treatment results reviewed;care plan/treatment goals reviewed;risks/benefits reviewed;current/potential barriers reviewed;participants voiced agreement with care plan;participants included;patient   OT Total Evaluation Time   OT Eval, Low Complexity Minutes (39112) 5   OT Goals   Therapy Frequency (OT) 3 times/week   OT Predicted Duration/Target Date for Goal Attainment 05/06/25   OT Goals Edema   OT: Edema education to increase ability to manage edema after discharge from the hospital Patient;Verbalize;Demonstrate;[place];Skin care " routine;signs/symptoms of intolerance;wear schedule;limb positioning;garrnet/bandage care;discharge recommendations   OT: Management of edema bandages Patient;Verbalize;Demonstrate;[place];quick wrap;garment(s)   OT: Functional edema exercise program to reduce limb volume, increase activity tolerance and improve independence with ADL Patient;Verbalize;Demonstrates;[place];HEP;walking program   OT Discharge Planning   OT Plan C2 check, size E. Pt declining OT needs this date- screen needs   OT Discharge Recommendation (DC Rec) home with assist;home with home care occupational therapy   OT Rationale for DC Rec per chart review, pt had initiated home care services after recent admission (4/16-4/24). anticiapte pt would benefit from resumption of services on return home.   Total Session Time   Timed Code Treatment Minutes 16   Total Session Time (sum of timed and untimed services) 21

## 2025-05-01 NOTE — PROGRESS NOTES
[facility]    Progress Note - Medicine Service, NAIF TEAM 1       Date of Admission:  4/30/2025    Assessment & Plan   Chantell Kidd is a 59-year-old female with PMHx of 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    Changes today:  -C/f constipation, increased miralax to TID with plans for XR abdomen tomorrow if ongoing constipation  -NPO at [facility] for PEG-J replacement tomorrow  -Lantus increased to 4U as per endocrinology with Novolog Correction Scale increased to 1:75>150 q4 hours   -Oxycodone increased to 10mg Q4H    # Post-pancreatectomy diabetes (Type 1)  # TPAIT (2012)  Patient presented with BG of 747 with HbA1c of 15.8. Did not have an angion gap. She was previously admitted with BG of 900 with HbA1c of 19.1 (4/15) and was seen by endocrinology inpatient. During that admission there was concern for patient not taking her dose of insulin at that HbA1c. She was discharged on Lantus 3U, carb correction of 1U per 14g, sliding scale insulin as well as NPH 4U for TF which she reported taking, althought frequently had blood glucose above 500 upon checks at home. She was previously seen outpatient by both endocrinology and PCP. Started on insulin gtt and endocrinology was consulted.  - Endocrinology consulted   - Lantus 4U  - Insulin gtt if BGs >300 x 2 checks.   - Carb coverage 1 units per 20 g CHO, TID AC and PRN with snacks/supplements    - LDSSI  - Hypoglycemia protocol    - Will begin Creon 12,000-38,000 -60,000 unit delayed-release capsule -Take 8 capsules (96,000 units of lipase total) by mouth 3 (three) times per day with meals.   - Plan to consult GI regarding creon dosing, since patient currently receiving continuous  Tfs in addition to po feeds.    #Lower extremity edema, improved  Patient presented with bilateral tender LE edema which is present at baseline but has worsened. No prior history of heart failure. No trauma, no erythema or warmth, however, is tender to palpation. LE US negative for DVT. Prior TTE in our system is from 2016, although patient looks euvolemic on physical exam. Cr normal without proteinuria on UA, reassuring against nephrotic syndrome. TTE WNL.  -Lymphedema wraps  -Continue PTA lasix 40mg (prescribed by provider in [place])    # Abdominal pain   # PEG tube displacement  # Bloating  # Malnutrition  # Cachexia  Patient recently had PEG-J repositioned by GI on 4/17 after G tube became dislodged with an audible pop. She now has leakage of fluid which she took PO around the site of PEG tube, along with surrounding erythema and abdominal pain. She endorses severe 10/10 lower abdominal pain and back. Lipase <7 with AST/ ALT/ alkaline phosphatase downtrending from prior admission. CTAP showing no acute intrabdominal pathology with PEG-J coiled in the stomach, along with large colonic stool burden. Has been having recurrent admissions for abdominal pain with pain team consulted during prior admissions. Pain could be 2/2 PEG-J displacement vs steatorrhea 2/2 malabsorption.  - GI consulted   - Plan for exchange of PEG-J to PEG + new PEJ placement tentatively on 5/2  - Goal Platelets >50, INR <1.8, hgb >7.0  - Gastric port to gravity  - Hold TF  - RD consulted   - Creon for TF coverage   - Thiamine 100mg x 5-7 days  - Pain plan:   - acetaminophen 975 mg PO q8h              - flexeril 5 mg PO TID PRN              - oxycodone 5 mg PO q4h PRN increased to 10mg              - Started gabapentin 100 mg TID    - Continue PTA famotidine, protonix, linaclotide, reglan and sucralfate  - Zofran PRN for nausea     # History of adrenal insufficiency  Followed by Dr. Maher. Previously suppressed AM cortisol and has been on  empiric therapy for possible adrenal insufficiency, unclear if central vs transient. Most recent clinic note describes inability to wean steroids due to hypoglycemia episodes.   - Continue PTA hydrocortisone 10mg in the AM and 15mg in the PM    #Hypothyroidism  -Continue PTA levothyroxine    #MDD  -Continue PTA duloxetine     Diet:  Regular. Hold TF  DVT Prophylaxis: Pneumatic Compression Devices  Mckee Catheter: Not present  Fluids: None  Lines: PRESENT      PICC 04/30/25 Double Lumen Right Brachial vein medial-Site Assessment: WDL      Cardiac Monitoring: None  Code Status: Full Code           Diet: NPO for Procedure/Surgery per Anesthesia Guidelines Except for: Meds; Clear liquids before procedure/surgery: ADULT (Age GREATER than or Equal to 18 years) - Clear liquids 2 hours before procedure/surgery  Regular Diet Adult    DVT Prophylaxis: Pneumatic Compression Devices  Mckee Catheter: Not present  Fluids: None  Lines: PRESENT      PICC 04/30/25 Double Lumen Right Brachial vein medial-Site Assessment: WDL      Cardiac Monitoring: None  Code Status: Full Code      Clinically Significant Risk Factors         # Hyponatremia: Lowest Na = 131 mmol/L in last 2 days, will monitor as appropriate  # Hypochloremia: Lowest Cl = 91 mmol/L in last 2 days, will monitor as appropriate  # Hypocalcemia: Lowest Ca = 8.6 mg/dL in last 2 days, will monitor and replace as appropriate                     # Severe Malnutrition: based on nutrition assessment and treatment provided per dietitian's recommendations., PRESENT ON ADMISSION   # Financial/Environmental Concerns: none         Social Drivers of Health   Food Insecurity: High Risk (4/16/2025)    Food Insecurity     Within the past 12 months, did you worry that your food would run out before you got money to buy more?: Yes     Within the past 12 months, did the food you bought just not last and you didn t have money to get more?: Yes   Depression: At risk (1/23/2025)    PHQ-2      PHQ-2 Score: 6         Disposition Plan          The patient's care was discussed with the Attending Physician, Dr. Limon .    Aisha Shabbir, MD  Medicine Service, [facility] TEAM 1  [facility]  Securely message with Apprion (more info)  Text page via [employer] Paging/Directory   See signed in provider for up to date coverage information  ______________________________________________________________________    Interval History   NAEO. Patient endorses ongoing abdominal pain and requests for IV pain meds or something stronger. She denies any nausea, vomiting, hematemesis, black/tarry stools, chest pain, fever, chills, night sweats.    Physical Exam   Vital Signs: Temp: 98.2  F (36.8  C) Temp src: Oral BP: 125/75 Pulse: 79   Resp: 16 SpO2: 97 % O2 Device: None (Room air)    Weight: 0 lbs 0 oz    General Appearance: Alert, conversant, in left lateral decubitus position, not in acute distress  Respiratory: CTAB  Cardiovascular: RRR  GI: malnourished, soft, tenderness in lower abdominal quadrants  Skin: erythema around PEG-J site  Other: Moving extremities spontaneously    Medical Decision Making

## 2025-05-01 NOTE — CONSULTS
ERS 26% Consult completed 4/30/25    Lorenzo Joshi SW--Inpatient Care Management  [facility]

## 2025-05-01 NOTE — PROGRESS NOTES
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 Inpatient Diabetes Service consulted for management of hyperglycemia.          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 Poor control  (A1c 19.1 % 4/16/25, Hgb: 11.9)  TF/Steroid induced Hyperglycemia    Plan/Recommendations:   - Increase Lantus 3 --> 4 units q 24 hrs at 1800.  - Novolog Meal Coverage: 1 unit per 20 grams CHO, TID AC and PRN with snacks/supplements  - Change Novolog Correction Scale: 1:100>140 --> 1:75>150 q4 hours  - BG Monitoring: q4 hours  - Hypoglycemia protocol  - PRN D10 @ 10-50 mL/hr. If BG is 80 mg/dL or less, start D10 and titrate up to get  mg/dL or more. Sto D10 if BG is 125 mg/dL or more.   - Carb counting protocol     Discussion:   Good glycemic control overnight with rise in BG this AM. Pt denies PO intake besides apple sauce with meds ~0900. Given elevation at 0724, will increase Lantus for today. Will also increase correction insulin. For now will leave ICR and adjust as indicated (pt diet order placed this afternoon). Discussed with pt option to use insulin drip for dose finding after G and J tube placement tomorrow. She would prefer the drip compared to subcutaneous adjustments. Will plan to start insulin drip tomorrow in case of hyperglycemia or when TF starts and as it advances.     Discharge Planning: (tentative)  Medications: TBD. Likely adjustments in PTA medications  Test Claims: None needed. See notes from KATHY Ayala 4/18/25.  Pt needs to go through specialty pharmacy for dexcom fills as they are not covered under her Medicare Part D plan.   Education: Needs to be assessed closer to discharge.   Outpatient Follow-up: recommend [facility]; next scheduled 5/13/25 with Libby Jay RN and 8/21/25 with Dr. Maher     Please notify Inpatient Diabetes Service if changes are planned to steroids, nutrition, TPN/TF and anticipated procedures requiring prolonged NPO status.         Interval History/Review of Systems :   The last 24 hours progress and nursing notes reviewed.  - No acute events overnight.  - No immediate concerns for nausea/vomiting. Is interested in eating today.     Planned Procedures/Surgeries: exchange of PEG-J to PEG + PEJ 5/2    Inpatient Glucose Control:       Recent Labs   Lab 05/01/25  1101 05/01/25  1007 05/01/25  0724 05/01/25  0325 04/30/25  2043 04/30/25  1636   * 236* 189* 140* 129* 83             Medications Impacting Glycemia:   Steroids:  maintenance hydrocortisone 10 mg am, 15 mg HS (adrenal insufficiency)    D5W-containing solutions/medications: none   Other medications impacting glucose: none         Nutrition:   Orders Placed This Encounter      NPO for Procedure/Surgery per Anesthesia Guidelines Except for: Meds; Clear liquids before procedure/surgery: ADULT (Age GREATER than or Equal to 18 years) - Clear liquids 2 hours before procedure/surgery      Regular Diet Adult    Supplements: none   TF:  4/30- [place] Peptide 1.5 @ 45 ml/hr- goal provides 149 g CHO per day. Stopped at 1500 due to TF coiled and Gtube exchange planned for 5/2   TPN: none         Diabetes History: see full consult note for complete diabetes history   Diabetes Type and Duration: Pancreatogenic diabetes s/p total pancreatectomy and islet autotransplant with insulin dependence since 1/2012  GAD65 antibody, zinc transporter 8 antibody, islet antibody, insulin antibody not available on epic search.     Numerous  "C-peptides:  Component      Latest Ref Rng 8/28/2018  6:47 AM 9/3/2018  6:41 PM 9/6/2018  8:00 AM 9/7/2018  6:55 AM 4/18/2019  11:04 AM 4/3/2025  2:01 PM   C-Peptide      0.9 - 6.9 ng/mL 0.3 (L)  0.2 (L)  1.8  2.8  1.8  0.5 (L)    Patient Fasting?           Yes       PTA Medication Regimen:   - NPH 4 units in AM, 3 units PM to cover TF  - Lantus 4 units daily at 1800  - Novolog ICR 1:14  - Novolog correction 1:50>175 q4-6 hours  Missing doses?: denies  Historical Diabetes Medications: MDI, insulin pumps     Glucose monitoring device/frequency/trends: Reports difficulty obtaining Dexcom and Omnipods, insurance is waiting on paperwork, her doctors's office says they sent it. H Accucheck (checking 4-6 times daily) running 400 to \"high\" for the past 4 days.   Hypoglycemia PTA:   - Frequency: none  - Severity: + hx of hypoglycemic seizures  - Awareness: absent/decreased  - Treatment: Orange juice.     Outpatient Diabetes Provider: [facility]: Dr. Maher (LOV 4/3/25)  Formal Diabetes Education/Educator: yes        Physical Exam:   /75   Pulse 79   Temp 98.2  F (36.8  C) (Oral)   Resp 16   SpO2 97%   Constitutional: well-appearing patient in no acute distress.  Eyes: sclera anicteric.  Respiratory: No signs of labored breathing.         Data:     Lab Results   Component Value Date    A1C 15.8 (H) 04/30/2025    A1C 19.1 (H) 04/15/2025    A1C 18.9 (H) 04/03/2025    A1C 17.1 (H) 01/23/2025    A1C 11.1 (H) 03/02/2023    A1C 7.3 (H) 11/09/2020    A1C 6.7 (A) 11/21/2019    A1C 8.2 (H) 06/11/2019    A1C Canceled, Test credited 06/10/2019    A1C 9.6 (H) 04/18/2019       ROUTINE IP LABS (Last four results)  BMP  Recent Labs   Lab 05/01/25  1101 05/01/25  1007 05/01/25  0724 05/01/25  0325 04/30/25  0908 04/30/25  0530   NA  --  136  --   --   --  131*   POTASSIUM  --  3.8  --   --   --  4.2   CHLORIDE  --  100  --   --   --  91*   ELIZA  --  8.6*  --   --   --  9.1   CO2  --  27  --   --   --  25   BUN  --  5.5*  " --   --   --  6.3*   CR  --  0.38*  --   --   --  0.38*   * 236* 189* 140*   < > 747*    < > = values in this interval not displayed.     CBC  Recent Labs   Lab 05/01/25  1007 04/30/25  0530   WBC 7.8 5.9   RBC 3.72* 3.36*   HGB 11.7 10.5*   HCT 34.9* 30.7*   MCV 94 91   MCH 31.5 31.3   MCHC 33.5 34.2   RDW 13.2 13.1   * 574*     INR  Recent Labs   Lab 05/01/25  1007   INR 0.92       Inpatient Diabetes Service will continue to follow, please don't hesitate to contact the team with any questions or concerns.     Earlene Samuel PA-C  Inpatient Diabetes Service  Available on Class Messenger or Secure Chat     Plan discussed with patient, bedside RN, and primary team via this note.    To contact Inpatient Diabetes Service:     7 AM - 5 PM: Page the Calvin DAVID following the patient that day (see filed or incomplete progress notes/consult notes under Endocrinology)    OR if uncertain of provider assignment: page job code [phone]    5 PM - 7 AM: First call after hours is to primary service.    For urgent after-hours questions, page job code for on call fellow: [phone]     I spent a total of 40 minutes on the date of the encounter doing prep/post-work, chart review, history and exam, documentation and further activities per the note including lab review, multidisciplinary communication, counseling the patient and/or coordinating care regarding acute hyper/hypoglycemic management, as well as discharge management and planning/communication.

## 2025-05-01 NOTE — PROGRESS NOTES
Type of Form Received: [facility] Client Coordination Note POC Approval PT/OT Eval     Form Received (Date) 4/30/25   Form Filled out No   Placed in provider folder Yes

## 2025-05-02 ENCOUNTER — APPOINTMENT (OUTPATIENT)
Dept: GENERAL RADIOLOGY | Facility: CLINIC | Age: 60
End: 2025-05-02
Payer: MEDICARE

## 2025-05-02 ENCOUNTER — APPOINTMENT (OUTPATIENT)
Dept: GENERAL RADIOLOGY | Facility: CLINIC | Age: 60
End: 2025-05-02
Attending: STUDENT IN AN ORGANIZED HEALTH CARE EDUCATION/TRAINING PROGRAM
Payer: MEDICARE

## 2025-05-02 ENCOUNTER — ENROLLMENT (OUTPATIENT)
Dept: HOME HEALTH SERVICES | Facility: HOME HEALTH | Age: 60
End: 2025-05-02
Payer: MEDICARE

## 2025-05-02 LAB
ALBUMIN SERPL BCG-MCNC: 3.3 G/DL (ref 3.5–5.2)
ALP SERPL-CCNC: 121 U/L (ref 40–150)
ALT SERPL W P-5'-P-CCNC: 68 U/L (ref 0–50)
ANION GAP SERPL CALCULATED.3IONS-SCNC: 8 MMOL/L (ref 7–15)
AST SERPL W P-5'-P-CCNC: 46 U/L (ref 0–45)
BILIRUB SERPL-MCNC: 0.2 MG/DL
BUN SERPL-MCNC: 7.4 MG/DL (ref 8–23)
CALCIUM SERPL-MCNC: 8.2 MG/DL (ref 8.8–10.4)
CHLORIDE SERPL-SCNC: 100 MMOL/L (ref 98–107)
CREAT SERPL-MCNC: 0.4 MG/DL (ref 0.51–0.95)
EGFRCR SERPLBLD CKD-EPI 2021: >90 ML/MIN/1.73M2
ERYTHROCYTE [DISTWIDTH] IN BLOOD BY AUTOMATED COUNT: 13.6 % (ref 10–15)
GLUCOSE BLDC GLUCOMTR-MCNC: 105 MG/DL (ref 70–99)
GLUCOSE BLDC GLUCOMTR-MCNC: 109 MG/DL (ref 70–99)
GLUCOSE BLDC GLUCOMTR-MCNC: 124 MG/DL (ref 70–99)
GLUCOSE BLDC GLUCOMTR-MCNC: 141 MG/DL (ref 70–99)
GLUCOSE BLDC GLUCOMTR-MCNC: 142 MG/DL (ref 70–99)
GLUCOSE BLDC GLUCOMTR-MCNC: 159 MG/DL (ref 70–99)
GLUCOSE BLDC GLUCOMTR-MCNC: 163 MG/DL (ref 70–99)
GLUCOSE BLDC GLUCOMTR-MCNC: 167 MG/DL (ref 70–99)
GLUCOSE BLDC GLUCOMTR-MCNC: 217 MG/DL (ref 70–99)
GLUCOSE BLDC GLUCOMTR-MCNC: 219 MG/DL (ref 70–99)
GLUCOSE BLDC GLUCOMTR-MCNC: 251 MG/DL (ref 70–99)
GLUCOSE BLDC GLUCOMTR-MCNC: 258 MG/DL (ref 70–99)
GLUCOSE BLDC GLUCOMTR-MCNC: 289 MG/DL (ref 70–99)
GLUCOSE BLDC GLUCOMTR-MCNC: 311 MG/DL (ref 70–99)
GLUCOSE BLDC GLUCOMTR-MCNC: 312 MG/DL (ref 70–99)
GLUCOSE BLDC GLUCOMTR-MCNC: 329 MG/DL (ref 70–99)
GLUCOSE BLDC GLUCOMTR-MCNC: 331 MG/DL (ref 70–99)
GLUCOSE BLDC GLUCOMTR-MCNC: 92 MG/DL (ref 70–99)
GLUCOSE SERPL-MCNC: 358 MG/DL (ref 70–99)
HCO3 SERPL-SCNC: 25 MMOL/L (ref 22–29)
HCT VFR BLD AUTO: 35 % (ref 35–47)
HGB BLD-MCNC: 11.7 G/DL (ref 11.7–15.7)
MCH RBC QN AUTO: 31.2 PG (ref 26.5–33)
MCHC RBC AUTO-ENTMCNC: 33.4 G/DL (ref 31.5–36.5)
MCV RBC AUTO: 93 FL (ref 78–100)
PLATELET # BLD AUTO: 550 10E3/UL (ref 150–450)
POTASSIUM SERPL-SCNC: 4.5 MMOL/L (ref 3.4–5.3)
PROT SERPL-MCNC: 5.7 G/DL (ref 6.4–8.3)
RBC # BLD AUTO: 3.75 10E6/UL (ref 3.8–5.2)
SMALL BOWEL ENTEROSCOPY: NORMAL
SODIUM SERPL-SCNC: 133 MMOL/L (ref 135–145)
WBC # BLD AUTO: 6.3 10E3/UL (ref 4–11)

## 2025-05-02 PROCEDURE — 250N000011 HC RX IP 250 OP 636: Mod: JZ

## 2025-05-02 PROCEDURE — 250N000025 HC SEVOFLURANE, PER MIN: Performed by: INTERNAL MEDICINE

## 2025-05-02 PROCEDURE — A6403 STERILE GAUZE>16 <= 48 SQ IN: HCPCS

## 2025-05-02 PROCEDURE — 250N000009 HC RX 250: Performed by: INTERNAL MEDICINE

## 2025-05-02 PROCEDURE — 250N000012 HC RX MED GY IP 250 OP 636 PS 637: Performed by: ANESTHESIOLOGY

## 2025-05-02 PROCEDURE — 999N000179 XR SURGERY CARM FLUORO LESS THAN 5 MIN W STILLS

## 2025-05-02 PROCEDURE — 360N000082 HC SURGERY LEVEL 2 W/ FLUORO, PER MIN: Performed by: INTERNAL MEDICINE

## 2025-05-02 PROCEDURE — 999N000065 XR ABDOMEN PORT 1 VIEW

## 2025-05-02 PROCEDURE — 250N000013 HC RX MED GY IP 250 OP 250 PS 637

## 2025-05-02 PROCEDURE — 258N000003 HC RX IP 258 OP 636: Performed by: ANESTHESIOLOGY

## 2025-05-02 PROCEDURE — 80053 COMPREHEN METABOLIC PANEL: CPT

## 2025-05-02 PROCEDURE — 250N000011 HC RX IP 250 OP 636: Performed by: INTERNAL MEDICINE

## 2025-05-02 PROCEDURE — 99207 PR SC NO CHARGE VISIT/PATIENT NOT SEEN: CPT | Performed by: PHYSICIAN ASSISTANT

## 2025-05-02 PROCEDURE — 999N000141 HC STATISTIC PRE-PROCEDURE NURSING ASSESSMENT: Performed by: INTERNAL MEDICINE

## 2025-05-02 PROCEDURE — 82962 GLUCOSE BLOOD TEST: CPT

## 2025-05-02 PROCEDURE — 74018 RADEX ABDOMEN 1 VIEW: CPT | Mod: 26 | Performed by: RADIOLOGY

## 2025-05-02 PROCEDURE — 250N000013 HC RX MED GY IP 250 OP 250 PS 637: Performed by: ANESTHESIOLOGY

## 2025-05-02 PROCEDURE — C1894 INTRO/SHEATH, NON-LASER: HCPCS | Performed by: INTERNAL MEDICINE

## 2025-05-02 PROCEDURE — 99233 SBSQ HOSP IP/OBS HIGH 50: CPT | Performed by: STUDENT IN AN ORGANIZED HEALTH CARE EDUCATION/TRAINING PROGRAM

## 2025-05-02 PROCEDURE — 250N000012 HC RX MED GY IP 250 OP 636 PS 637

## 2025-05-02 PROCEDURE — 85014 HEMATOCRIT: CPT

## 2025-05-02 PROCEDURE — 250N000011 HC RX IP 250 OP 636: Mod: JZ | Performed by: ANESTHESIOLOGY

## 2025-05-02 PROCEDURE — B4035 ENTERAL FEED SUPP PUMP PER D: HCPCS

## 2025-05-02 PROCEDURE — 710N000010 HC RECOVERY PHASE 1, LEVEL 2, PER MIN: Performed by: INTERNAL MEDICINE

## 2025-05-02 PROCEDURE — A4452 WATERPROOF TAPE: HCPCS

## 2025-05-02 PROCEDURE — S9342 HIT ENTERAL PUMP DIEM: HCPCS

## 2025-05-02 PROCEDURE — 250N000011 HC RX IP 250 OP 636: Mod: JZ | Performed by: STUDENT IN AN ORGANIZED HEALTH CARE EDUCATION/TRAINING PROGRAM

## 2025-05-02 PROCEDURE — 0D20XUZ CHANGE FEEDING DEVICE IN UPPER INTESTINAL TRACT, EXTERNAL APPROACH: ICD-10-PCS | Performed by: INTERNAL MEDICINE

## 2025-05-02 PROCEDURE — 370N000017 HC ANESTHESIA TECHNICAL FEE, PER MIN: Performed by: INTERNAL MEDICINE

## 2025-05-02 PROCEDURE — 250N000011 HC RX IP 250 OP 636

## 2025-05-02 PROCEDURE — 255N000002 HC RX 255 OP 636: Performed by: INTERNAL MEDICINE

## 2025-05-02 PROCEDURE — 250N000009 HC RX 250: Performed by: ANESTHESIOLOGY

## 2025-05-02 PROCEDURE — 272N000002 HC OR SUPPLY OTHER OPNP: Performed by: INTERNAL MEDICINE

## 2025-05-02 PROCEDURE — 99233 SBSQ HOSP IP/OBS HIGH 50: CPT

## 2025-05-02 PROCEDURE — 272N000001 HC OR GENERAL SUPPLY STERILE: Performed by: INTERNAL MEDICINE

## 2025-05-02 PROCEDURE — 36592 COLLECT BLOOD FROM PICC: CPT

## 2025-05-02 PROCEDURE — 250N000013 HC RX MED GY IP 250 OP 250 PS 637: Performed by: STUDENT IN AN ORGANIZED HEALTH CARE EDUCATION/TRAINING PROGRAM

## 2025-05-02 PROCEDURE — 0DH68UZ INSERTION OF FEEDING DEVICE INTO STOMACH, VIA NATURAL OR ARTIFICIAL OPENING ENDOSCOPIC: ICD-10-PCS | Performed by: INTERNAL MEDICINE

## 2025-05-02 PROCEDURE — 120N000002 HC R&B MED SURG/OB UMMC

## 2025-05-02 RX ORDER — HYDROMORPHONE HCL IN WATER/PF 6 MG/30 ML
0.2 PATIENT CONTROLLED ANALGESIA SYRINGE INTRAVENOUS
Status: DISCONTINUED | OUTPATIENT
Start: 2025-05-02 | End: 2025-05-02 | Stop reason: HOSPADM

## 2025-05-02 RX ORDER — OXYCODONE HYDROCHLORIDE 10 MG/1
10 TABLET ORAL
Status: DISCONTINUED | OUTPATIENT
Start: 2025-05-02 | End: 2025-05-02 | Stop reason: HOSPADM

## 2025-05-02 RX ORDER — DEXAMETHASONE SODIUM PHOSPHATE 4 MG/ML
4 INJECTION, SOLUTION INTRA-ARTICULAR; INTRALESIONAL; INTRAMUSCULAR; INTRAVENOUS; SOFT TISSUE
Status: DISCONTINUED | OUTPATIENT
Start: 2025-05-02 | End: 2025-05-02 | Stop reason: HOSPADM

## 2025-05-02 RX ORDER — ONDANSETRON 2 MG/ML
4 INJECTION INTRAMUSCULAR; INTRAVENOUS EVERY 30 MIN PRN
Status: DISCONTINUED | OUTPATIENT
Start: 2025-05-02 | End: 2025-05-02 | Stop reason: HOSPADM

## 2025-05-02 RX ORDER — SODIUM CHLORIDE, SODIUM LACTATE, POTASSIUM CHLORIDE, CALCIUM CHLORIDE 600; 310; 30; 20 MG/100ML; MG/100ML; MG/100ML; MG/100ML
INJECTION, SOLUTION INTRAVENOUS CONTINUOUS
Status: DISCONTINUED | OUTPATIENT
Start: 2025-05-02 | End: 2025-05-02 | Stop reason: HOSPADM

## 2025-05-02 RX ORDER — HYDROMORPHONE HCL IN WATER/PF 6 MG/30 ML
0.2 PATIENT CONTROLLED ANALGESIA SYRINGE INTRAVENOUS EVERY 5 MIN PRN
Status: DISCONTINUED | OUTPATIENT
Start: 2025-05-02 | End: 2025-05-02 | Stop reason: HOSPADM

## 2025-05-02 RX ORDER — ONDANSETRON 4 MG/1
4 TABLET, ORALLY DISINTEGRATING ORAL EVERY 30 MIN PRN
Status: DISCONTINUED | OUTPATIENT
Start: 2025-05-02 | End: 2025-05-02 | Stop reason: HOSPADM

## 2025-05-02 RX ORDER — NICOTINE POLACRILEX 4 MG
15-30 LOZENGE BUCCAL
Status: DISCONTINUED | OUTPATIENT
Start: 2025-05-02 | End: 2025-05-06 | Stop reason: HOSPADM

## 2025-05-02 RX ORDER — OXYCODONE HYDROCHLORIDE 5 MG/1
5 TABLET ORAL EVERY 4 HOURS PRN
Status: DISCONTINUED | OUTPATIENT
Start: 2025-05-02 | End: 2025-05-02

## 2025-05-02 RX ORDER — DEXTROSE MONOHYDRATE 100 MG/ML
INJECTION, SOLUTION INTRAVENOUS CONTINUOUS PRN
Status: DISCONTINUED | OUTPATIENT
Start: 2025-05-02 | End: 2025-05-02 | Stop reason: HOSPADM

## 2025-05-02 RX ORDER — OXYCODONE HYDROCHLORIDE 5 MG/1
5-10 TABLET ORAL EVERY 4 HOURS PRN
Status: DISCONTINUED | OUTPATIENT
Start: 2025-05-02 | End: 2025-05-05

## 2025-05-02 RX ORDER — FENTANYL CITRATE 50 UG/ML
50 INJECTION, SOLUTION INTRAMUSCULAR; INTRAVENOUS EVERY 5 MIN PRN
Status: DISCONTINUED | OUTPATIENT
Start: 2025-05-02 | End: 2025-05-02 | Stop reason: HOSPADM

## 2025-05-02 RX ORDER — HYDROMORPHONE HCL IN WATER/PF 6 MG/30 ML
0.2 PATIENT CONTROLLED ANALGESIA SYRINGE INTRAVENOUS ONCE
Status: COMPLETED | OUTPATIENT
Start: 2025-05-02 | End: 2025-05-02

## 2025-05-02 RX ORDER — HYDROMORPHONE HCL IN WATER/PF 6 MG/30 ML
0.4 PATIENT CONTROLLED ANALGESIA SYRINGE INTRAVENOUS EVERY 5 MIN PRN
Status: DISCONTINUED | OUTPATIENT
Start: 2025-05-02 | End: 2025-05-02 | Stop reason: HOSPADM

## 2025-05-02 RX ORDER — OXYCODONE HYDROCHLORIDE 5 MG/1
5 TABLET ORAL
Status: COMPLETED | OUTPATIENT
Start: 2025-05-02 | End: 2025-05-02

## 2025-05-02 RX ORDER — METHOCARBAMOL 100 MG/ML
500 INJECTION, SOLUTION INTRAMUSCULAR; INTRAVENOUS ONCE
Status: COMPLETED | OUTPATIENT
Start: 2025-05-02 | End: 2025-05-02

## 2025-05-02 RX ORDER — FENTANYL CITRATE 50 UG/ML
25 INJECTION, SOLUTION INTRAMUSCULAR; INTRAVENOUS EVERY 5 MIN PRN
Status: DISCONTINUED | OUTPATIENT
Start: 2025-05-02 | End: 2025-05-02 | Stop reason: HOSPADM

## 2025-05-02 RX ORDER — NALOXONE HYDROCHLORIDE 0.4 MG/ML
0.1 INJECTION, SOLUTION INTRAMUSCULAR; INTRAVENOUS; SUBCUTANEOUS
Status: DISCONTINUED | OUTPATIENT
Start: 2025-05-02 | End: 2025-05-02 | Stop reason: HOSPADM

## 2025-05-02 RX ORDER — ONDANSETRON 2 MG/ML
4 INJECTION INTRAMUSCULAR; INTRAVENOUS EVERY 6 HOURS PRN
Status: DISCONTINUED | OUTPATIENT
Start: 2025-05-02 | End: 2025-05-06 | Stop reason: HOSPADM

## 2025-05-02 RX ORDER — IOPAMIDOL 510 MG/ML
INJECTION, SOLUTION INTRAVASCULAR PRN
Status: DISCONTINUED | OUTPATIENT
Start: 2025-05-02 | End: 2025-05-02 | Stop reason: HOSPADM

## 2025-05-02 RX ORDER — DEXTROSE MONOHYDRATE 25 G/50ML
25-50 INJECTION, SOLUTION INTRAVENOUS
Status: DISCONTINUED | OUTPATIENT
Start: 2025-05-02 | End: 2025-05-06 | Stop reason: HOSPADM

## 2025-05-02 RX ORDER — DEXTROSE MONOHYDRATE 100 MG/ML
INJECTION, SOLUTION INTRAVENOUS CONTINUOUS PRN
Status: DISCONTINUED | OUTPATIENT
Start: 2025-05-02 | End: 2025-05-02

## 2025-05-02 RX ADMIN — CYCLOBENZAPRINE HYDROCHLORIDE 5 MG: 5 TABLET, FILM COATED ORAL at 21:36

## 2025-05-02 RX ADMIN — INSULIN HUMAN 4.5 UNITS/HR: 1 INJECTION, SOLUTION INTRAVENOUS at 08:46

## 2025-05-02 RX ADMIN — FENTANYL CITRATE 50 MCG: 50 INJECTION, SOLUTION INTRAMUSCULAR; INTRAVENOUS at 10:53

## 2025-05-02 RX ADMIN — ONDANSETRON HYDROCHLORIDE 4 MG: 4 TABLET, FILM COATED ORAL at 04:17

## 2025-05-02 RX ADMIN — INSULIN GLARGINE 3 UNITS: 100 INJECTION, SOLUTION SUBCUTANEOUS at 18:39

## 2025-05-02 RX ADMIN — INSULIN ASPART 2 UNITS: 100 INJECTION, SOLUTION INTRAVENOUS; SUBCUTANEOUS at 04:11

## 2025-05-02 RX ADMIN — HYDROMORPHONE HYDROCHLORIDE 0.2 MG: 0.2 INJECTION, SOLUTION INTRAMUSCULAR; INTRAVENOUS; SUBCUTANEOUS at 08:17

## 2025-05-02 RX ADMIN — OXYCODONE HYDROCHLORIDE 10 MG: 10 TABLET ORAL at 04:17

## 2025-05-02 RX ADMIN — SUCRALFATE 1 G: 1 TABLET ORAL at 21:37

## 2025-05-02 RX ADMIN — SODIUM CHLORIDE 5 UNITS: 0.9 INJECTION, SOLUTION INTRAVENOUS at 08:02

## 2025-05-02 RX ADMIN — OXYCODONE HYDROCHLORIDE 10 MG: 5 TABLET ORAL at 20:28

## 2025-05-02 RX ADMIN — Medication 5 ML: at 06:40

## 2025-05-02 RX ADMIN — HYDROMORPHONE HYDROCHLORIDE 0.2 MG: 0.2 INJECTION, SOLUTION INTRAMUSCULAR; INTRAVENOUS; SUBCUTANEOUS at 11:46

## 2025-05-02 RX ADMIN — DRONABINOL 5 MG: 5 CAPSULE ORAL at 20:25

## 2025-05-02 RX ADMIN — GABAPENTIN 100 MG: 100 CAPSULE ORAL at 20:25

## 2025-05-02 RX ADMIN — INSULIN ASPART 10 UNITS: 100 INJECTION, SOLUTION INTRAVENOUS; SUBCUTANEOUS at 18:37

## 2025-05-02 RX ADMIN — HYDROCORTISONE 15 MG: 10 TABLET ORAL at 20:24

## 2025-05-02 RX ADMIN — INSULIN ASPART 3 UNITS: 100 INJECTION, SOLUTION INTRAVENOUS; SUBCUTANEOUS at 01:06

## 2025-05-02 RX ADMIN — ACETAMINOPHEN 975 MG: 325 TABLET ORAL at 21:36

## 2025-05-02 RX ADMIN — FAMOTIDINE 20 MG: 20 TABLET, FILM COATED ORAL at 21:36

## 2025-05-02 RX ADMIN — HYDROMORPHONE HYDROCHLORIDE 0.4 MG: 0.2 INJECTION, SOLUTION INTRAMUSCULAR; INTRAVENOUS; SUBCUTANEOUS at 12:15

## 2025-05-02 RX ADMIN — OXYCODONE HYDROCHLORIDE 5 MG: 5 TABLET ORAL at 13:44

## 2025-05-02 RX ADMIN — OXYCODONE HYDROCHLORIDE 10 MG: 5 TABLET ORAL at 16:01

## 2025-05-02 RX ADMIN — HYDROMORPHONE HYDROCHLORIDE 0.4 MG: 0.2 INJECTION, SOLUTION INTRAMUSCULAR; INTRAVENOUS; SUBCUTANEOUS at 13:30

## 2025-05-02 RX ADMIN — HYDROMORPHONE HYDROCHLORIDE 0.4 MG: 0.2 INJECTION, SOLUTION INTRAMUSCULAR; INTRAVENOUS; SUBCUTANEOUS at 12:41

## 2025-05-02 RX ADMIN — HYDROMORPHONE HYDROCHLORIDE 0.4 MG: 0.2 INJECTION, SOLUTION INTRAMUSCULAR; INTRAVENOUS; SUBCUTANEOUS at 13:17

## 2025-05-02 RX ADMIN — METOCLOPRAMIDE 10 MG: 10 TABLET ORAL at 20:24

## 2025-05-02 RX ADMIN — PANTOPRAZOLE SODIUM 40 MG: 40 TABLET, DELAYED RELEASE ORAL at 18:34

## 2025-05-02 RX ADMIN — METHOCARBAMOL 500 MG: 100 INJECTION, SOLUTION INTRAMUSCULAR; INTRAVENOUS at 14:23

## 2025-05-02 RX ADMIN — ONDANSETRON HYDROCHLORIDE 4 MG: 4 TABLET, FILM COATED ORAL at 18:45

## 2025-05-02 RX ADMIN — TOPIRAMATE 100 MG: 100 TABLET, FILM COATED ORAL at 20:25

## 2025-05-02 RX ADMIN — FENTANYL CITRATE 50 MCG: 50 INJECTION, SOLUTION INTRAMUSCULAR; INTRAVENOUS at 11:19

## 2025-05-02 RX ADMIN — SUCRALFATE 1 G: 1 TABLET ORAL at 18:34

## 2025-05-02 RX ADMIN — POLYETHYLENE GLYCOL 3350 17 G: 17 POWDER, FOR SOLUTION ORAL at 20:23

## 2025-05-02 RX ADMIN — ACETAMINOPHEN 975 MG: 325 TABLET ORAL at 06:38

## 2025-05-02 RX ADMIN — LEVOTHYROXINE SODIUM 125 MCG: 125 TABLET ORAL at 06:38

## 2025-05-02 RX ADMIN — HYDROMORPHONE HYDROCHLORIDE 0.2 MG: 0.2 INJECTION, SOLUTION INTRAMUSCULAR; INTRAVENOUS; SUBCUTANEOUS at 08:05

## 2025-05-02 RX ADMIN — HYDROMORPHONE HYDROCHLORIDE 0.2 MG: 0.2 INJECTION, SOLUTION INTRAMUSCULAR; INTRAVENOUS; SUBCUTANEOUS at 18:41

## 2025-05-02 ASSESSMENT — ACTIVITIES OF DAILY LIVING (ADL)
ADLS_ACUITY_SCORE: 61

## 2025-05-02 NOTE — PROGRESS NOTES
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 Inpatient Diabetes Service consulted for management of hyperglycemia.          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 Poor control  (A1c 19.1 % 4/16/25, Hgb: 11.9)  TF/Steroid induced Hyperglycemia    Plan/Recommendations:   - Start IV Insulin drip (non-DKA protocol)  - Change Lantus 4 --> 3 units q 24 hrs at 1800.  - Change Novolog Meal Coverage: 1 unit per 20 --> 15 grams CHO, TID AC and PRN with snacks/supplements  - Novolog Correction Scale: 1:75>150 q4 hours  - BG Monitoring: q4 hours  - Hypoglycemia protocol  - PRN D10 @ 10-50 mL/hr. Start if BG <100 at 10 ml/hr and titrate by 10 ml to maintain BG between 100-150. Check blood sugars hourly while running dextrose fluids. If BG not at goal of 100-150 mg/dL after 1 hour, increase rate by 5mL/hr. If BG >120 mg/dL, decrease by 5 mL/hr. Stop fluids if  mg/dL or greater. Stop fluids if BG >120 mg/dL and D10 is at 10mL/hr.    - Carb counting protocol     Discussion:   Significant hyperglycemia yesterday. Suspect increase in carb coverage needed. Additionally pt did not receive any correction ~1600 despite q4 hour orders. IV insulin started in OR and pt received 10mg of dexamethasone. Had discussed with pt 5/1 of starting insulin drip as TF started and she was in agreement with this. Will continue IV insulin at this time and discontinue correction Novolog.     Addendum: 4:57 PM - looked at medicine team note and  saw pt will be NPO tomorrow for PEJ replacement. Will reduce Lantus today and change parameters for PRN D10 order.     Discharge Planning: (tentative)  Medications: TBD. Likely adjustments in PTA medications  Test Claims: None needed. See notes from KATHY Ayala 4/18/25. Pt needs to go through specialty pharmacy for dexcom fills as they are not covered under her Medicare Part D plan.   Education: Needs to be assessed closer to discharge.   Outpatient Follow-up: recommend [facility] End[facility]; next scheduled 5/13/25 with Libby Jay RN and 8/21/25 with Dr. Maher     Please notify Inpatient Diabetes Service if changes are planned to steroids, nutrition, TPN/TF and anticipated procedures requiring prolonged NPO status.         Interval History/Review of Systems :   The last 24 hours progress and nursing notes reviewed.  - No acute events overnight.  - OR this AM for PEG + PEJ. Seen in PACU post-procedure  - feeling a little groggy    Planned Procedures/Surgeries: exchange of PEG-J to PEG + PEJ 5/2    Inpatient Glucose Control:       Recent Labs   Lab 05/02/25  1203 05/02/25  1105 05/02/25  1006 05/02/25  0835 05/02/25  0732 05/02/25  0646   * 167* 219* 331* 329* 358*             Medications Impacting Glycemia:   Steroids:  maintenance hydrocortisone 10 mg am, 15 mg HS (adrenal insufficiency); Dexamethasone 10mg 5/2 at 0923    D5W-containing solutions/medications: none   Other medications impacting glucose: none         Nutrition:   Orders Placed This Encounter      NPO for Procedure/Surgery per Anesthesia Guidelines Except for: Meds; Clear liquids before procedure/surgery: ADULT (Age GREATER than or Equal to 18 years) - Clear liquids 2 hours before procedure/surgery    Supplements: none   TF:  4/30- [place] [employer] Peptide 1.5 @ 45 ml/hr- goal provides 149 g CHO per day. Stopped at 1500 due to TF coiled and Gtube exchange planned for 5/2   TPN: none         Diabetes History: see full consult note for complete  "diabetes history   Diabetes Type and Duration: Pancreatogenic diabetes s/p total pancreatectomy and islet autotransplant with insulin dependence since 1/2012  GAD65 antibody, zinc transporter 8 antibody, islet antibody, insulin antibody not available on epic search.     Numerous C-peptides:  Component      Latest Ref Rng 8/28/2018  6:47 AM 9/3/2018  6:41 PM 9/6/2018  8:00 AM 9/7/2018  6:55 AM 4/18/2019  11:04 AM 4/3/2025  2:01 PM   C-Peptide      0.9 - 6.9 ng/mL 0.3 (L)  0.2 (L)  1.8  2.8  1.8  0.5 (L)    Patient Fasting?           Yes       PTA Medication Regimen:   - NPH 4 units in AM, 3 units PM to cover TF  - Lantus 4 units daily at 1800  - Novolog ICR 1:14  - Novolog correction 1:50>175 q4-6 hours  Missing doses?: denies  Historical Diabetes Medications: MDI, insulin pumps     Glucose monitoring device/frequency/trends: Reports difficulty obtaining Dexcom and Omnipods, insurance is waiting on paperwork, her doctors's office says they sent it. H Accucheck (checking 4-6 times daily) running 400 to \"high\" for the past 4 days.   Hypoglycemia PTA:   - Frequency: none  - Severity: + hx of hypoglycemic seizures  - Awareness: absent/decreased  - Treatment: Orange juice.     Outpatient Diabetes Provider: [facility]: Dr. Maher (LOV 4/3/25)  Formal Diabetes Education/Educator: yes        Physical Exam:   BP 99/65   Pulse 79   Temp 97.6  F (36.4  C) (Axillary)   Resp 10   SpO2 96%   Constitutional: well-appearing patient in no acute distress.  Eyes: sclera anicteric.  Respiratory: No signs of labored breathing.          Data:     Lab Results   Component Value Date    A1C 15.8 (H) 04/30/2025    A1C 19.1 (H) 04/15/2025    A1C 18.9 (H) 04/03/2025    A1C 17.1 (H) 01/23/2025    A1C 11.1 (H) 03/02/2023    A1C 7.3 (H) 11/09/2020    A1C 6.7 (A) 11/21/2019    A1C 8.2 (H) 06/11/2019    A1C Canceled, Test credited 06/10/2019    A1C 9.6 (H) 04/18/2019       ROUTINE IP LABS (Last four results)  BMP  Recent Labs   Lab " 05/02/25  1203 05/02/25  1105 05/02/25  1006 05/02/25  0835 05/02/25  0732 05/02/25  0646 05/01/25  1101 05/01/25  1007 04/30/25  0908 04/30/25  0530   NA  --   --   --   --   --  133*  --  136  --  131*   POTASSIUM  --   --   --   --   --  4.5  --  3.8  --  4.2   CHLORIDE  --   --   --   --   --  100  --  100  --  91*   ELIZA  --   --   --   --   --  8.2*  --  8.6*  --  9.1   CO2  --   --   --   --   --  25  --  27  --  25   BUN  --   --   --   --   --  7.4*  --  5.5*  --  6.3*   CR  --   --   --   --   --  0.40*  --  0.38*  --  0.38*   * 167* 219* 331*   < > 358*   < > 236*   < > 747*    < > = values in this interval not displayed.     CBC  Recent Labs   Lab 05/02/25  0646 05/01/25  1007 04/30/25  0530   WBC 6.3 7.8 5.9   RBC 3.75* 3.72* 3.36*   HGB 11.7 11.7 10.5*   HCT 35.0 34.9* 30.7*   MCV 93 94 91   MCH 31.2 31.5 31.3   MCHC 33.4 33.5 34.2   RDW 13.6 13.2 13.1   * 587* 574*     INR  Recent Labs   Lab 05/01/25  1007   INR 0.92       Inpatient Diabetes Service will continue to follow, please don't hesitate to contact the team with any questions or concerns.     Earlene Samuel PA-C  Inpatient Diabetes Service  Available on [email or web address] or Secure Chat     Plan discussed with patient, bedside RN, and primary team via this note.    To contact Inpatient Diabetes Service:     7 AM - 5 PM: Page the IDS DAVID following the patient that day (see filed or incomplete progress notes/consult notes under Endocrinology)    OR if uncertain of provider assignment: page job code [phone]    5 PM - 7 AM: First call after hours is to primary service.    For urgent after-hours questions, page job code for on call fellow: [phone]     I spent a total of 40 minutes on the date of the encounter doing prep/post-work, chart review, history and exam, documentation and further activities per the note including lab review, multidisciplinary communication, counseling the patient and/or coordinating care regarding acute hyper/hypoglycemic  management, as well as discharge management and planning/communication.      An additional 10 minutes was spent in relation to the addendum of this note.

## 2025-05-02 NOTE — CARE PLAN
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 Inpatient Diabetes Service consulted for management of hyperglycemia.     G/j NEEDS TO BE REPLACED  Site dscharge    A&O X4  BLE edema  Pain on abdomen and back (right side)  Zofran given 1x overnight  Q4h bs  Pills in apple sauce  VSS, room air      GET NEW BG  Needs to take gabapentin, hydrocortisone and insulin if needed

## 2025-05-02 NOTE — OR NURSING
Pt had elevated blood sugar 329 in pre-op and rating her pain at 9/10. She was given 5 units IV of insulin with resulting blood sugar 331. Pain treated with 2 doses of hydromorphone 0.2 mg IV with no change in her pain. Dr Zuniga stated he would manage her pain in the OR. Insulin gtt started in pre-op with algorithm #1 at 4.5 units/hr.

## 2025-05-02 NOTE — PROGRESS NOTES
5/2/2025 Gastroenterology Brief Note      Chantell Kidd is a 59 year old female who GI AE is following for dislodged PEG-J tube with plans to transition PEG-J --> PEG and place NEW PEJ in OR today.     Chart reviewed since last evaluated. NAEON. Vitals stable.    BMP  Recent Labs   Lab 05/02/25  0646 05/01/25  1007 04/30/25  0530   * 136 131*   POTASSIUM 4.5 3.8 4.2   CHLORIDE 100 100 91*   ELIZA 8.2* 8.6* 9.1   CO2 25 27 25   BUN 7.4* 5.5* 6.3*   CR 0.40* 0.38* 0.38*     HEME  Recent Labs   Lab 05/02/25  0646 05/01/25  1007 04/30/25  0530   WBC 6.3 7.8 5.9   HGB 11.7 11.7 10.5*   * 587* 574*   INR  --  0.92  --      LIVER  Recent Labs   Lab 05/02/25  0646 05/01/25  1007 04/30/25  0530   ALKPHOS 121 124 155*   AST 46* 66* 194*   ALT 68* 78* 125*   BILITOTAL 0.2 0.4 0.3      PANC  Recent Labs   Lab 04/30/25  0530   LIPASE 7*         Recommendations:  -- Plan for exchange of PEG-J for PEG + NEW PEJ placement today in [facility] OR  -- NPO, tube feeds held  -- Gastric post to gravity  -- Remainder of management per primary team      The patient was not seen today. This note is a product of chart review and discussion with primary team.       Above in collaboration/discussed with Dr. Perdomo, GI attending    Kim Mccloud PA-C, [facility]  Advanced Endoscopy/Pancreaticobiliary GI Service  [facility]  [place]

## 2025-05-02 NOTE — PROVIDER NOTIFICATION
Pt's pain 9/10, gave available PRNs-did not help. Can we do a one time dose of something to try and mitigate pain?

## 2025-05-02 NOTE — PROGRESS NOTES
[facility]    Progress Note - Medicine Service, NAIF TEAM 1       Date of Admission:  4/30/2025    Assessment & Plan   Chantell Kidd is a 59-year-old female with PMHx of 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    Changes today:  -With ongoing constipation which could be contributing to abdominal pain, patient has been started on senna.  -Continuing on insulin drip along with 4U lantus at bedtime.  -Carb coverage increased to 1U per 12g    # Abdominal pain   # PEG tube displacement  # Bloating  # Malnutrition  # Cachexia  Patient recently had PEG-J repositioned by GI on 4/17 after G tube became dislodged with an audible pop. She now has leakage of fluid which she took PO around the site of PEG tube, along with surrounding erythema and abdominal pain. She endorses severe 10/10 lower abdominal pain and back. Lipase <7 with AST/ ALT/ alkaline phosphatase downtrending from prior admission. CTAP showing no acute intrabdominal pathology with PEG-J coiled in the stomach, along with large colonic stool burden. Has been having recurrent admissions for abdominal pain with pain team consulted during prior admissions. Pain could be 2/2 PEG-J displacement vs constipation vs steatorrhea 2/2 malabsorption.transition  PEG-J replaced by GI on 05/02.   - GI consulted  - RD consulted   - Creon for TF coverage   - Thiamine 100mg x 5-7 days  - Pain plan:   - acetaminophen 975 mg PO q8h              - flexeril 5 mg PO TID PRN              - oxycodone 5 mg PO q4h PRN increased to 10mg              - Started gabapentin 100 mg TID    - Constipation:   -Scheduled miralax and scheduled senna  - Continue PTA famotidine, protonix, linaclotide, reglan  and sucralfate  - Zofran PRN for nausea    #Pneumobilia  Patient found to have persistent small amount of pneumobilia on abdominal Xray post PEG-J replacement, similar to prior presentation. With patient being afebrile, without leukocytosis without any localized RUQ pain, lower concern for cholecystitis. Low concern for perforation as a potential cause with pt not having rebound tenderness or rigidity. Pneumobilia could be 2/2 new PEG-J replacement.  -Continue to monitor for now  -If acutely worsening abdominal pain, will consider abdominal imaging and surgery consult.    # Post-pancreatectomy diabetes (Type 1)  # TPAIT (2012)  Patient presented with BG of 747 with HbA1c of 15.8. Did not have an angion gap. She was previously admitted with BG of 900 with HbA1c of 19.1 (4/15) and was seen by endocrinology inpatient. During that admission there was concern for patient not taking her dose of insulin at that HbA1c. She was discharged on Lantus 3U, carb correction of 1U per 14g, sliding scale insulin as well as NPH 4U for TF which she reported taking, althought frequently had blood glucose above 500 upon checks at home. She was previously seen outpatient by both endocrinology and PCP. Started on insulin gtt and endocrinology was consulted.  - Endocrinology consulted   - Lantus 4U  - Insulin gtt if BGs >300 x 2 checks.   - Carb coverage 1 units per 12 g CHO, TID AC and PRN with snacks/supplements    - LDSSI  - Hypoglycemia protocol    - Will begin Creon 12,000-38,000 -60,000 unit delayed-release capsule -Take 8 capsules (96,000 units of lipase total) by mouth 3 (three) times per day with meals.   - Plan to consult GI regarding creon dosing, since patient currently receiving continuous Tfs in addition to po feeds.    #Lower extremity edema, improved  Patient presented with bilateral tender LE edema which is present at baseline but has worsened. No prior history of heart failure. No trauma, no erythema or warmth, however,  is tender to palpation. LE US negative for DVT. Prior TTE in our system is from 2016, although patient looks euvolemic on physical exam. Cr normal without proteinuria on UA, reassuring against nephrotic syndrome. TTE WNL.  -Lymphedema wraps  -Continue PTA lasix 40mg (prescribed by provider in [place])     # History of adrenal insufficiency  Followed by Dr. Maher. Previously suppressed AM cortisol and has been on empiric therapy for possible adrenal insufficiency, unclear if central vs transient. Most recent clinic note describes inability to wean steroids due to hypoglycemia episodes.   - Continue PTA hydrocortisone 10mg in the AM and 15mg in the PM    #Hypothyroidism  -Continue PTA levothyroxine    #MDD  -Continue PTA duloxetine     Diet:  Regular. Hold TF  DVT Prophylaxis: Pneumatic Compression Devices  Mckee Catheter: Not present  Fluids: None  Lines: PRESENT      PICC 04/30/25 Double Lumen Right Brachial vein medial-Site Assessment: WDL      Cardiac Monitoring: None  Code Status: Full Code            Diet: NPO for Procedure/Surgery per Anesthesia Guidelines Except for: Meds; Clear liquids before procedure/surgery: ADULT (Age GREATER than or Equal to 18 years) - Clear liquids 2 hours before procedure/surgery    DVT Prophylaxis: Low Risk/Ambulatory with no VTE prophylaxis indicated  Mckee Catheter: Not present  Fluids: None  Lines: PRESENT      PICC 04/30/25 Double Lumen Right Brachial vein medial-Site Assessment: WDL      Cardiac Monitoring: None  Code Status: Full Code      Clinically Significant Risk Factors         # Hyponatremia: Lowest Na = 133 mmol/L in last 2 days, will monitor as appropriate   # Hypocalcemia: Lowest Ca = 8.2 mg/dL in last 2 days, will monitor and replace as appropriate     # Hypoalbuminemia: Lowest albumin = 3.3 g/dL at 5/2/2025  6:46 AM, will monitor as appropriate                 # Severe Malnutrition: based on nutrition assessment and treatment provided per dietitian's recommendations.,  PRESENT ON ADMISSION   # Financial/Environmental Concerns: none         Social Drivers of Health   Food Insecurity: High Risk (4/16/2025)    Food Insecurity     Within the past 12 months, did you worry that your food would run out before you got money to buy more?: Yes     Within the past 12 months, did the food you bought just not last and you didn t have money to get more?: Yes   Depression: At risk (1/23/2025)    PHQ-2     PHQ-2 Score: 6         Disposition Plan          The patient's care was discussed with the Attending Physician, Dr. Limon. .    Aisha Shabbir, MD  Medicine Service, [facility] TEAM [address] [place] [address]  Securely message with [employer] (more info)  Text page via [facility] Paging/Directory   See signed in provider for up to date coverage information  ______________________________________________________________________    Interval History   Patient had worsening abdominal pain overnight, abdominal xray was performed and she was given 1x dilaudid which relieved some of her pain. She continues to have abdominal pain, albeit resolved this morning. She feels constipated and has not had a Bm SINCE 05/01.    Physical Exam   Vital Signs: Temp: 97.9  F (36.6  C) Temp src: Oral BP: 97/59 Pulse: 82   Resp: 15 SpO2: 97 % O2 Device: None (Room air) Oxygen Delivery: 2 LPM  Weight: 0 lbs 0 oz    General Appearance: Alert and oriented x3. Alert, uncomfortable appearing  Respiratory: Clear to auscultation bilaterally  Cardiovascular: Regular rate and rhythm  GI: soft, non-distended, small bleeding with dressing over PEG-J site. Lower abdominal quadrant tenderness  Skin: No bruising on exposed skin    Medical Decision Making

## 2025-05-02 NOTE — PLAN OF CARE
Goal Outcome Evaluation:      Plan of Care Reviewed With: patient    Overall Patient Progress: no changeOverall Patient Progress: no change    Outcome Evaluation: patient arrived to unit at 1450 from PACU. BP soft within parameters.  reports abd pain, provider was going to reviewed med list and reorder medicaton.  G  tube to gravity, J tube clamp.  Double lumens picc on R arm, insulin gtt is going at alg# 1 0.5 unit/hr.  next BG due at 1600.  pt had 1 large urine. continue to monitor.    Admitted/transferred from:   2 RN full   skin assessment completed by Luba Paulson, RN and Lauren Phalen, RN.  Skin assessment finding: redness blanchable to coccyx, G tube to gravity, J tube clamp. Double lumens PICC to R arm, CDI.     Interventions/actions:educated pt shift weight in bed, mepliex applied to coccyx area.      Bedside Emergency Equipment Present:  Suction Regulator: Yes  Suction Canister: Yes  Tubing between Regulator and Canister: Yes  O2 Regulator with Tree: Yes  Ambu Bag: Yes

## 2025-05-02 NOTE — PROGRESS NOTES
[facility]    Medicine Progress Note - Medicine Service, NAIF TEAM 1    Date of Admission:  4/30/2025    Assessment & Plan   Chantell Kidd is a 59-year-old female with PMHx of 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    Changes today:  -Underwent PEG tube replacement today  - If pain poorly controlled, ok to increase home dose of oxycodone to 10mg  -continue Lantus 4 units every day. Novolog Meal coverage per endocrine plan: 1 unit per 20 --> 15 grams CHO, TID AC and PRN with snacks/supplements. Novolog Correction Scale: 1:75>150 q4 hours.    # Post-pancreatectomy diabetes (Type 1)  # TPAIT (2012)  Patient presented with BG of 747 with HbA1c of 15.8. Did not have an angion gap. She was previously admitted with BG of 900 with HbA1c of 19.1 (4/15) and was seen by endocrinology inpatient. During that admission there was concern for patient not taking her dose of insulin at that HbA1c. She was discharged on Lantus 3U, carb correction of 1U per 14g, sliding scale insulin as well as NPH 4U for TF which she reported taking, althought frequently had blood glucose above 500 upon checks at home. She was previously seen outpatient by both endocrinology and PCP. Started on insulin gtt and endocrinology was consulted.  - Endocrinology consulted   - Lantus 4U  - Insulin gtt if BGs >300 x 2 checks.   - Carb coverage 1 units per 20 g CHO, TID AC and PRN with snacks/supplements    - LDSSI  - Hypoglycemia protocol    - Will begin Creon 12,000-38,000 -60,000 unit delayed-release capsule -Take 8 capsules (96,000 units of lipase total) by mouth 3 (three) times per day with meals.   - Plan to consult GI regarding creon dosing, since patient  currently receiving continuous Tfs in addition to po feeds.    #Lower extremity edema, improved  Patient presented with bilateral tender LE edema which is present at baseline but has worsened. No prior history of heart failure. No trauma, no erythema or warmth, however, is tender to palpation. LE US negative for DVT. Prior TTE in our system is from 2016, although patient looks euvolemic on physical exam. Cr normal without proteinuria on UA, reassuring against nephrotic syndrome. TTE WNL.  -Lymphedema wraps  -Continue PTA lasix 40mg (prescribed by provider in [place])    # Abdominal pain   # PEG tube displacement  # Bloating  # Malnutrition  # Cachexia  Patient recently had PEG-J repositioned by GI on 4/17 after G tube became dislodged with an audible pop. She now has leakage of fluid which she took PO around the site of PEG tube, along with surrounding erythema and abdominal pain. She endorses severe 10/10 lower abdominal pain and back. Lipase <7 with AST/ ALT/ alkaline phosphatase downtrending from prior admission. CTAP showing no acute intrabdominal pathology with PEG-J coiled in the stomach, along with large colonic stool burden. Has been having recurrent admissions for abdominal pain with pain team consulted during prior admissions. Pain could be 2/2 PEG-J displacement vs steatorrhea 2/2 malabsorption.  - GI consulted   - Underwent PEG-H tube replacement on 5/2  - Goal Platelets >50, INR <1.8, hgb >7.0  - Hold TF until cleared by GI to use peg-J tube  - RD consulted   - Creon for TF coverage   - Thiamine 100mg x 5-7 days  - Pain plan:   - acetaminophen 975 mg PO q8h              - flexeril 5 mg PO TID PRN              - oxycodone 5 mg PO q4h PRN increased to 10mg              - Started gabapentin 100 mg TID    - Continue PTA famotidine, protonix, linaclotide, reglan and sucralfate  - Zofran PRN for nausea     # History of adrenal insufficiency  Followed by Dr. Maher. Previously suppressed AM cortisol and has been  on empiric therapy for possible adrenal insufficiency, unclear if central vs transient. Most recent clinic note describes inability to wean steroids due to hypoglycemia episodes.   - Continue PTA hydrocortisone 10mg in the AM and 15mg in the PM    #Hypothyroidism  -Continue PTA levothyroxine    #MDD  -Continue PTA duloxetine     Diet:  Regular. Hold TF  DVT Prophylaxis: Pneumatic Compression Devices  Mckee Catheter: Not present  Fluids: None  Lines: PRESENT      PICC 04/30/25 Double Lumen Right Brachial vein medial-Site Assessment: WDL      Cardiac Monitoring: None  Code Status: Full Code             Diet: Regular Diet Adult    DVT Prophylaxis: Low Risk/Ambulatory with no VTE prophylaxis indicated  Mckee Catheter: Not present  Lines: PRESENT      PICC 04/30/25 Double Lumen Right Brachial vein medial-Site Assessment: WDL      Cardiac Monitoring: None  Code Status: Full Code      Clinically Significant Risk Factors         # Hyponatremia: Lowest Na = 133 mmol/L in last 2 days, will monitor as appropriate   # Hypocalcemia: Lowest Ca = 8.2 mg/dL in last 2 days, will monitor and replace as appropriate     # Hypoalbuminemia: Lowest albumin = 3.3 g/dL at 5/2/2025  6:46 AM, will monitor as appropriate                 # Severe Malnutrition: based on nutrition assessment and treatment provided per dietitian's recommendations., PRESENT ON ADMISSION   # Financial/Environmental Concerns: none         Social Drivers of Health    Food Insecurity: High Risk (4/16/2025)    Food Insecurity     Within the past 12 months, did you worry that your food would run out before you got money to buy more?: Yes     Within the past 12 months, did the food you bought just not last and you didn t have money to get more?: Yes   Depression: At risk (1/23/2025)    PHQ-2     PHQ-2 Score: 6          Disposition Plan     Medically Ready for Discharge: Ready Now             SOFÍA ABARCA MD  Medicine Service, [facility] TEAM 1  [facility]  [facility]  Securely message with [employer] (more info)  Text page via [facility] Paging/Directory   See signed in provider for up to date coverage information  ______________________________________________________________________    Interval History   Continues to have abd pain. Patient amenable to continue on po pain meds. No emesis, No blood in the stools. Afebrile.    Physical Exam   Vital Signs: Temp: 97.9  F (36.6  C) Temp src: Oral BP: 91/81 Pulse: 78   Resp: (!) 8 SpO2: 97 % O2 Device: None (Room air) Oxygen Delivery: 2 LPM  Weight: 0 lbs 0 oz    General Appearance: NAD.  Respiratory: CTAB  Cardiovascular: S1, S2. No murmurs  GI: Abd soft, non tender, diminished bs  Skin: No acute skin lesions    Medical Decision Making       35 MINUTES SPENT BY ME on the date of service doing chart review, history, exam, documentation & further activities per the note.      Data     I have personally reviewed the following data over the past 24 hrs:    6.3  \   11.7   / 550 (H)     133 (L) 100 7.4 (L) /  109 (H)   4.5 25 0.40 (L) \     ALT: 68 (H) AST: 46 (H) AP: 121 TBILI: 0.2   ALB: 3.3 (L) TOT PROTEIN: 5.7 (L) LIPASE: N/A       Imaging results reviewed over the past 24 hrs:   Recent Results (from the past 24 hours)   XR Surgery NITHIN Fluoro Less Than 5 Min w Stills    Narrative    This exam was marked as non-reportable because it will not be read by a   radiologist or a [place] non-radiologist provider.

## 2025-05-03 ENCOUNTER — APPOINTMENT (OUTPATIENT)
Dept: OCCUPATIONAL THERAPY | Facility: CLINIC | Age: 60
End: 2025-05-03
Payer: MEDICARE

## 2025-05-03 LAB
ALBUMIN SERPL BCG-MCNC: 3.1 G/DL (ref 3.5–5.2)
ALP SERPL-CCNC: 108 U/L (ref 40–150)
ALT SERPL W P-5'-P-CCNC: 47 U/L (ref 0–50)
ANION GAP SERPL CALCULATED.3IONS-SCNC: 10 MMOL/L (ref 7–15)
AST SERPL W P-5'-P-CCNC: 30 U/L (ref 0–45)
BILIRUB SERPL-MCNC: 0.2 MG/DL
BUN SERPL-MCNC: 9.4 MG/DL (ref 8–23)
CALCIUM SERPL-MCNC: 8.4 MG/DL (ref 8.8–10.4)
CHLORIDE SERPL-SCNC: 102 MMOL/L (ref 98–107)
CREAT SERPL-MCNC: 0.37 MG/DL (ref 0.51–0.95)
EGFRCR SERPLBLD CKD-EPI 2021: >90 ML/MIN/1.73M2
ERYTHROCYTE [DISTWIDTH] IN BLOOD BY AUTOMATED COUNT: 13.8 % (ref 10–15)
GLUCOSE BLDC GLUCOMTR-MCNC: 124 MG/DL (ref 70–99)
GLUCOSE BLDC GLUCOMTR-MCNC: 125 MG/DL (ref 70–99)
GLUCOSE BLDC GLUCOMTR-MCNC: 136 MG/DL (ref 70–99)
GLUCOSE BLDC GLUCOMTR-MCNC: 148 MG/DL (ref 70–99)
GLUCOSE BLDC GLUCOMTR-MCNC: 152 MG/DL (ref 70–99)
GLUCOSE BLDC GLUCOMTR-MCNC: 152 MG/DL (ref 70–99)
GLUCOSE BLDC GLUCOMTR-MCNC: 153 MG/DL (ref 70–99)
GLUCOSE BLDC GLUCOMTR-MCNC: 167 MG/DL (ref 70–99)
GLUCOSE BLDC GLUCOMTR-MCNC: 167 MG/DL (ref 70–99)
GLUCOSE BLDC GLUCOMTR-MCNC: 171 MG/DL (ref 70–99)
GLUCOSE BLDC GLUCOMTR-MCNC: 179 MG/DL (ref 70–99)
GLUCOSE BLDC GLUCOMTR-MCNC: 180 MG/DL (ref 70–99)
GLUCOSE BLDC GLUCOMTR-MCNC: 191 MG/DL (ref 70–99)
GLUCOSE BLDC GLUCOMTR-MCNC: 203 MG/DL (ref 70–99)
GLUCOSE BLDC GLUCOMTR-MCNC: 221 MG/DL (ref 70–99)
GLUCOSE BLDC GLUCOMTR-MCNC: 221 MG/DL (ref 70–99)
GLUCOSE BLDC GLUCOMTR-MCNC: 222 MG/DL (ref 70–99)
GLUCOSE BLDC GLUCOMTR-MCNC: 227 MG/DL (ref 70–99)
GLUCOSE BLDC GLUCOMTR-MCNC: 233 MG/DL (ref 70–99)
GLUCOSE BLDC GLUCOMTR-MCNC: 244 MG/DL (ref 70–99)
GLUCOSE BLDC GLUCOMTR-MCNC: 248 MG/DL (ref 70–99)
GLUCOSE BLDC GLUCOMTR-MCNC: 253 MG/DL (ref 70–99)
GLUCOSE BLDC GLUCOMTR-MCNC: 279 MG/DL (ref 70–99)
GLUCOSE BLDC GLUCOMTR-MCNC: 315 MG/DL (ref 70–99)
GLUCOSE SERPL-MCNC: 166 MG/DL (ref 70–99)
HCO3 SERPL-SCNC: 25 MMOL/L (ref 22–29)
HCT VFR BLD AUTO: 32.3 % (ref 35–47)
HGB BLD-MCNC: 11.1 G/DL (ref 11.7–15.7)
MCH RBC QN AUTO: 32.4 PG (ref 26.5–33)
MCHC RBC AUTO-ENTMCNC: 34.4 G/DL (ref 31.5–36.5)
MCV RBC AUTO: 94 FL (ref 78–100)
PLATELET # BLD AUTO: 474 10E3/UL (ref 150–450)
POTASSIUM SERPL-SCNC: 4.2 MMOL/L (ref 3.4–5.3)
PROT SERPL-MCNC: 5.6 G/DL (ref 6.4–8.3)
RBC # BLD AUTO: 3.43 10E6/UL (ref 3.8–5.2)
SODIUM SERPL-SCNC: 137 MMOL/L (ref 135–145)
WBC # BLD AUTO: 10.2 10E3/UL (ref 4–11)

## 2025-05-03 PROCEDURE — 99233 SBSQ HOSP IP/OBS HIGH 50: CPT | Mod: GC | Performed by: STUDENT IN AN ORGANIZED HEALTH CARE EDUCATION/TRAINING PROGRAM

## 2025-05-03 PROCEDURE — 36592 COLLECT BLOOD FROM PICC: CPT

## 2025-05-03 PROCEDURE — 250N000013 HC RX MED GY IP 250 OP 250 PS 637

## 2025-05-03 PROCEDURE — 99232 SBSQ HOSP IP/OBS MODERATE 35: CPT

## 2025-05-03 PROCEDURE — 85041 AUTOMATED RBC COUNT: CPT

## 2025-05-03 PROCEDURE — 250N000011 HC RX IP 250 OP 636: Performed by: INTERNAL MEDICINE

## 2025-05-03 PROCEDURE — 120N000002 HC R&B MED SURG/OB UMMC

## 2025-05-03 PROCEDURE — 250N000013 HC RX MED GY IP 250 OP 250 PS 637: Performed by: STUDENT IN AN ORGANIZED HEALTH CARE EDUCATION/TRAINING PROGRAM

## 2025-05-03 PROCEDURE — S9342 HIT ENTERAL PUMP DIEM: HCPCS

## 2025-05-03 PROCEDURE — 84450 TRANSFERASE (AST) (SGOT): CPT

## 2025-05-03 PROCEDURE — 97530 THERAPEUTIC ACTIVITIES: CPT | Mod: GO

## 2025-05-03 RX ORDER — AMOXICILLIN 250 MG
1 CAPSULE ORAL 2 TIMES DAILY
Status: DISCONTINUED | OUTPATIENT
Start: 2025-05-03 | End: 2025-05-06 | Stop reason: HOSPADM

## 2025-05-03 RX ORDER — AMOXICILLIN 250 MG
2 CAPSULE ORAL 2 TIMES DAILY
Status: DISCONTINUED | OUTPATIENT
Start: 2025-05-03 | End: 2025-05-06 | Stop reason: HOSPADM

## 2025-05-03 RX ADMIN — LINACLOTIDE 145 MCG: 145 CAPSULE, GELATIN COATED ORAL at 08:22

## 2025-05-03 RX ADMIN — LEVOTHYROXINE SODIUM 125 MCG: 125 TABLET ORAL at 08:14

## 2025-05-03 RX ADMIN — SUCRALFATE 1 G: 1 TABLET ORAL at 20:50

## 2025-05-03 RX ADMIN — POLYETHYLENE GLYCOL 3350 17 G: 17 POWDER, FOR SOLUTION ORAL at 15:06

## 2025-05-03 RX ADMIN — OXYCODONE HYDROCHLORIDE 10 MG: 5 TABLET ORAL at 16:45

## 2025-05-03 RX ADMIN — TOPIRAMATE 100 MG: 100 TABLET, FILM COATED ORAL at 20:50

## 2025-05-03 RX ADMIN — DRONABINOL 5 MG: 5 CAPSULE ORAL at 08:13

## 2025-05-03 RX ADMIN — PANCRELIPASE 5 CAPSULE: 60000; 12000; 38000 CAPSULE, DELAYED RELEASE PELLETS ORAL at 15:03

## 2025-05-03 RX ADMIN — TOPIRAMATE 100 MG: 100 TABLET, FILM COATED ORAL at 08:13

## 2025-05-03 RX ADMIN — ACETAMINOPHEN 975 MG: 325 TABLET ORAL at 05:51

## 2025-05-03 RX ADMIN — GABAPENTIN 100 MG: 100 CAPSULE ORAL at 15:06

## 2025-05-03 RX ADMIN — FAMOTIDINE 20 MG: 20 TABLET, FILM COATED ORAL at 20:50

## 2025-05-03 RX ADMIN — OXYCODONE HYDROCHLORIDE 10 MG: 5 TABLET ORAL at 05:51

## 2025-05-03 RX ADMIN — Medication 15 ML: at 08:13

## 2025-05-03 RX ADMIN — CYCLOBENZAPRINE HYDROCHLORIDE 5 MG: 5 TABLET, FILM COATED ORAL at 23:49

## 2025-05-03 RX ADMIN — METOCLOPRAMIDE 10 MG: 10 TABLET ORAL at 15:06

## 2025-05-03 RX ADMIN — INSULIN ASPART 12 UNITS: 100 INJECTION, SOLUTION INTRAVENOUS; SUBCUTANEOUS at 18:55

## 2025-05-03 RX ADMIN — OXYCODONE HYDROCHLORIDE 10 MG: 5 TABLET ORAL at 21:53

## 2025-05-03 RX ADMIN — FUROSEMIDE 40 MG: 40 TABLET ORAL at 08:13

## 2025-05-03 RX ADMIN — POLYETHYLENE GLYCOL 3350 17 G: 17 POWDER, FOR SOLUTION ORAL at 08:16

## 2025-05-03 RX ADMIN — CYCLOBENZAPRINE HYDROCHLORIDE 5 MG: 5 TABLET, FILM COATED ORAL at 00:51

## 2025-05-03 RX ADMIN — DRONABINOL 5 MG: 5 CAPSULE ORAL at 20:49

## 2025-05-03 RX ADMIN — SENNOSIDES AND DOCUSATE SODIUM 1 TABLET: 50; 8.6 TABLET ORAL at 11:50

## 2025-05-03 RX ADMIN — PANTOPRAZOLE SODIUM 40 MG: 40 TABLET, DELAYED RELEASE ORAL at 08:13

## 2025-05-03 RX ADMIN — PANCRELIPASE 5 CAPSULE: 60000; 12000; 38000 CAPSULE, DELAYED RELEASE PELLETS ORAL at 08:29

## 2025-05-03 RX ADMIN — OXYCODONE HYDROCHLORIDE 10 MG: 5 TABLET ORAL at 00:51

## 2025-05-03 RX ADMIN — THIAMINE HCL TAB 100 MG 100 MG: 100 TAB at 08:14

## 2025-05-03 RX ADMIN — HYDROCORTISONE 15 MG: 10 TABLET ORAL at 20:49

## 2025-05-03 RX ADMIN — ACETAMINOPHEN 975 MG: 325 TABLET ORAL at 15:06

## 2025-05-03 RX ADMIN — GABAPENTIN 100 MG: 100 CAPSULE ORAL at 20:49

## 2025-05-03 RX ADMIN — POTASSIUM CHLORIDE 20 MEQ: 750 TABLET, EXTENDED RELEASE ORAL at 08:13

## 2025-05-03 RX ADMIN — PANCRELIPASE 5 CAPSULE: 60000; 12000; 38000 CAPSULE, DELAYED RELEASE PELLETS ORAL at 18:56

## 2025-05-03 RX ADMIN — METOCLOPRAMIDE 10 MG: 10 TABLET ORAL at 08:14

## 2025-05-03 RX ADMIN — CYCLOBENZAPRINE HYDROCHLORIDE 5 MG: 5 TABLET, FILM COATED ORAL at 08:22

## 2025-05-03 RX ADMIN — HYDROCORTISONE 10 MG: 10 TABLET ORAL at 08:16

## 2025-05-03 RX ADMIN — SUCRALFATE 1 G: 1 TABLET ORAL at 16:45

## 2025-05-03 RX ADMIN — PANTOPRAZOLE SODIUM 40 MG: 40 TABLET, DELAYED RELEASE ORAL at 16:45

## 2025-05-03 RX ADMIN — POLYETHYLENE GLYCOL 3350 17 G: 17 POWDER, FOR SOLUTION ORAL at 20:50

## 2025-05-03 RX ADMIN — SENNOSIDES AND DOCUSATE SODIUM 1 TABLET: 50; 8.6 TABLET ORAL at 20:49

## 2025-05-03 RX ADMIN — DULOXETINE HYDROCHLORIDE 30 MG: 30 CAPSULE, DELAYED RELEASE ORAL at 08:14

## 2025-05-03 RX ADMIN — ACETAMINOPHEN 975 MG: 325 TABLET ORAL at 20:49

## 2025-05-03 RX ADMIN — Medication 5 ML: at 11:50

## 2025-05-03 RX ADMIN — GABAPENTIN 100 MG: 100 CAPSULE ORAL at 08:14

## 2025-05-03 RX ADMIN — METOCLOPRAMIDE 10 MG: 10 TABLET ORAL at 20:49

## 2025-05-03 RX ADMIN — OXYCODONE HYDROCHLORIDE 10 MG: 5 TABLET ORAL at 11:55

## 2025-05-03 RX ADMIN — INSULIN ASPART 10 UNITS: 100 INJECTION, SOLUTION INTRAVENOUS; SUBCUTANEOUS at 10:48

## 2025-05-03 RX ADMIN — DULOXETINE 60 MG: 60 CAPSULE, DELAYED RELEASE ORAL at 08:15

## 2025-05-03 RX ADMIN — INSULIN ASPART 4 UNITS: 100 INJECTION, SOLUTION INTRAVENOUS; SUBCUTANEOUS at 15:03

## 2025-05-03 RX ADMIN — SUCRALFATE 1 G: 1 TABLET ORAL at 11:50

## 2025-05-03 RX ADMIN — Medication 5 ML: at 07:48

## 2025-05-03 RX ADMIN — SUCRALFATE 1 G: 1 TABLET ORAL at 08:14

## 2025-05-03 ASSESSMENT — ACTIVITIES OF DAILY LIVING (ADL)
ADLS_ACUITY_SCORE: 65
ADLS_ACUITY_SCORE: 62
ADLS_ACUITY_SCORE: 65
ADLS_ACUITY_SCORE: 62
ADLS_ACUITY_SCORE: 65
ADLS_ACUITY_SCORE: 62
ADLS_ACUITY_SCORE: 65
ADLS_ACUITY_SCORE: 65
ADLS_ACUITY_SCORE: 62
ADLS_ACUITY_SCORE: 65
ADLS_ACUITY_SCORE: 62
ADLS_ACUITY_SCORE: 65
ADLS_ACUITY_SCORE: 62

## 2025-05-03 NOTE — PLAN OF CARE
Goal Outcome Evaluation:      Plan of Care Reviewed With: patient    Overall Patient Progress: improvingOverall Patient Progress: improving    /69 (BP Location: Left arm)   Pulse 76   Temp 98.3  F (36.8  C) (Oral)   Resp 18   SpO2 99%       Activity: Ax1  Neuros: A&Ox4  Cardiac: WDL, soft BPs  Respiratory: WDL, denies SOB  GI/: voids spontaneously,   Diet: Regular w/ TF @ 45ml/hr  Skin/Incisions: WDL  Lines/Drains: R PICC TKO w/ insulin, J tube w/ feeds and relizorb w/ change due at 1200, G tube to gravity  Pain/Nausea: pain managed w/ oxy & flexril, denies nausea  New Changes: BG checks Q1H on insulin gtt on algorithm 1 only, carb coverage 1:15

## 2025-05-03 NOTE — PROGRESS NOTES
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 Inpatient Diabetes Service consulted for management of hyperglycemia.          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 Poor control  (A1c 19.1 % 4/16/25, Hgb: 11.9)  TF/Steroid induced Hyperglycemia    Plan/Recommendations:   - IV Insulin drip (non-DKA protocol)  - Change Lantus 3 --> 4 units q 24 hrs at 1800.  - Change Novolog Meal Coverage: 1 unit per 15 --> 12 grams CHO, TID AC and 1:15 PRN with snacks/supplements  - Hold: Novolog Correction Scale: 1:75>150 q4 hours  - BG Monitoring: q4 hours  - Hypoglycemia protocol  - PRN D10 @ 10-50 mL/hr. Start if BG <100 at 10 ml/hr and titrate by 10 ml to maintain BG between 100-150. Check blood sugars hourly while running dextrose fluids. If BG not at goal of 100-150 mg/dL after 1 hour, increase rate by 5mL/hr. If BG >120 mg/dL, decrease by 5 mL/hr. Stop fluids if  mg/dL or greater. Stop fluids if BG >120 mg/dL and D10 is at 10mL/hr.    - Carb counting protocol     Discussion:   Hyperglycemic yesterday surrounding PO intake. Did receive carb coverage for meal of 154g cho, so suspect timing of insulin administration and dexamethasone impact contributing to post-prandial hyperglycemia. Will increase carb coverage for today as dexamethasone will likely stay in system through 2100 5/3. After discussion with pt will hold off on starting NPH until tomorrow morning for TF and continue IV insulin today. Elevation at noon  likely due to delayed mealtime insulin. Will continue to stay in algorithm only due to risk of hypoglycemia with higher algorithms.     Discharge Planning: (tentative)  Medications: TBD. Likely adjustments in PTA medications  Test Claims: None needed. See notes from KATHY Ayala 4/18/25. Pt needs to go through specialty pharmacy for dexcom fills as they are not covered under her Medicare Part D plan.   Education: Needs to be assessed closer to discharge.   Outpatient Follow-up: recommend [facility]; next scheduled 5/13/25 with Libby Jay RN and 8/21/25 with Dr. Maher     Please notify Inpatient Diabetes Service if changes are planned to steroids, nutrition, TPN/TF and anticipated procedures requiring prolonged NPO status.         Interval History/Review of Systems :   The last 24 hours progress and nursing notes reviewed.  - No acute events overnight.  - TF restarted last night (PEG + PEJ procedure successful, no need for second surgery).   - Some emesis last night after dinner--feels this is less than before.     Planned Procedures/Surgeries: none    Inpatient Glucose Control:       Recent Labs   Lab 05/03/25  1042 05/03/25  0941 05/03/25  0846 05/03/25  0754 05/03/25  0743 05/03/25  0644   * 221* 179* 166* 171* 167*             Medications Impacting Glycemia:   Steroids:  maintenance hydrocortisone 10 mg am, 15 mg HS (adrenal insufficiency); Dexamethasone 10mg 5/2 at 0923    D5W-containing solutions/medications: none   Other medications impacting glucose: none         Nutrition:   Orders Placed This Encounter      Regular Diet Adult    Supplements: none   TF:  4/30- [employer] Peptide 1.5 @ 45 ml/hr- goal provides 149 g CHO per day. Stopped at 1500 due to TF coiled and Gtube exchange planned for 5/2 5/2 - present: Continuous [employer] Peptide 1.5 @ 45 ml/hr- goal provides 149 g CHO per day started 2200  TPN: none         Diabetes History: see full consult note for complete diabetes history  "  Diabetes Type and Duration: Pancreatogenic diabetes s/p total pancreatectomy and islet autotransplant with insulin dependence since 1/2012  GAD65 antibody, zinc transporter 8 antibody, islet antibody, insulin antibody not available on epic search.     Numerous C-peptides:  Component      Latest Ref Rng 8/28/2018  6:47 AM 9/3/2018  6:41 PM 9/6/2018  8:00 AM 9/7/2018  6:55 AM 4/18/2019  11:04 AM 4/3/2025  2:01 PM   C-Peptide      0.9 - 6.9 ng/mL 0.3 (L)  0.2 (L)  1.8  2.8  1.8  0.5 (L)    Patient Fasting?           Yes       PTA Medication Regimen:   - NPH 4 units in AM, 3 units PM to cover TF  - Lantus 4 units daily at 1800  - Novolog ICR 1:14  - Novolog correction 1:50>175 q4-6 hours  Missing doses?: denies  Historical Diabetes Medications: MDI, insulin pumps     Glucose monitoring device/frequency/trends: Reports difficulty obtaining Dexcom and Omnipods, insurance is waiting on paperwork, her doctors's office says they sent it. H Accucheck (checking 4-6 times daily) running 400 to \"high\" for the past 4 days.   Hypoglycemia PTA:   - Frequency: none  - Severity: + hx of hypoglycemic seizures  - Awareness: absent/decreased  - Treatment: Orange juice.     Outpatient Diabetes Provider: [facility] End[facility]ology: Dr. Maher (LOV 4/3/25)  Formal Diabetes Education/Educator: yes        Physical Exam:   /69 (BP Location: Left arm)   Pulse 76   Temp 98.3  F (36.8  C) (Oral)   Resp 18   Wt 46.2 kg (101 lb 12.8 oz)   SpO2 99%   BMI 18.62 kg/m    Constitutional: well-appearing patient in no acute distress.  Eyes: sclera anicteric.  Respiratory: No signs of labored breathing.          Data:     Lab Results   Component Value Date    A1C 15.8 (H) 04/30/2025    A1C 19.1 (H) 04/15/2025    A1C 18.9 (H) 04/03/2025    A1C 17.1 (H) 01/23/2025    A1C 11.1 (H) 03/02/2023    A1C 7.3 (H) 11/09/2020    A1C 6.7 (A) 11/21/2019    A1C 8.2 (H) 06/11/2019    A1C Canceled, Test credited 06/10/2019    A1C 9.6 (H) 04/18/2019       ROUTINE " IP LABS (Last four results)  BMP  Recent Labs   Lab 05/03/25  1042 05/03/25  0941 05/03/25  0846 05/03/25  0754 05/02/25  0732 05/02/25  0646 05/01/25  1101 05/01/25  1007 04/30/25  0908 04/30/25  0530   NA  --   --   --  137  --  133*  --  136  --  131*   POTASSIUM  --   --   --  4.2  --  4.5  --  3.8  --  4.2   CHLORIDE  --   --   --  102  --  100  --  100  --  91*   ELIZA  --   --   --  8.4*  --  8.2*  --  8.6*  --  9.1   CO2  --   --   --  25  --  25  --  27  --  25   BUN  --   --   --  9.4  --  7.4*  --  5.5*  --  6.3*   CR  --   --   --  0.37*  --  0.40*  --  0.38*  --  0.38*   * 221* 179* 166*   < > 358*   < > 236*   < > 747*    < > = values in this interval not displayed.     CBC  Recent Labs   Lab 05/03/25  0754 05/02/25  0646 05/01/25  1007 04/30/25  0530   WBC 10.2 6.3 7.8 5.9   RBC 3.43* 3.75* 3.72* 3.36*   HGB 11.1* 11.7 11.7 10.5*   HCT 32.3* 35.0 34.9* 30.7*   MCV 94 93 94 91   MCH 32.4 31.2 31.5 31.3   MCHC 34.4 33.4 33.5 34.2   RDW 13.8 13.6 13.2 13.1   * 550* 587* 574*     INR  Recent Labs   Lab 05/01/25  1007   INR 0.92       Inpatient Diabetes Service will continue to follow, please don't hesitate to contact the team with any questions or concerns.     Earlene Samuel PA-C  Inpatient Diabetes Service  Available on [employer] or Secure Chat     Plan discussed with patient, bedside RN, and primary team via this note.    To contact Inpatient Diabetes Service:     7 AM - 5 PM: Page the IDS DAVID following the patient that day (see filed or incomplete progress notes/consult notes under Endocrinology)    OR if uncertain of provider assignment: page job code [phone]    5 PM - 7 AM: First call after hours is to primary service.    For urgent after-hours questions, page job code for on call fellow: [phone]     I spent a total of 45 minutes on the date of the encounter doing prep/post-work, chart review, history and exam, documentation and further activities per the note including lab review,  multidisciplinary communication, counseling the patient and/or coordinating care regarding acute hyper/hypoglycemic management, as well as discharge management and planning/communication.

## 2025-05-03 NOTE — PLAN OF CARE
Goal Outcome Evaluation:      Plan of Care Reviewed With: patient    Overall Patient Progress: no changeOverall Patient Progress: no change    [id number]   Blood pressure 96/65, pulse 79, temperature 97.9  F (36.6  C), temperature source Oral, resp. rate 14, SpO2 97%, not currently breastfeeding.  Status: POD #0 s/p S/p EGD, Jejunopexy, J tube placement, G tube exchange   Neuros: A&O x4, neuros intact.   Cardiac: BPs 80s/90s/60s. MAP >65. HR stable in 70s-80s. Asymptomatic   Respiratory: WNL on RA    GI/: Voiding via bed pan. LBM 5/1. +Flatus   Diet/Nausea: Regular diet, intermittent nausea w/ zofran given x1   Skin: G tube, J tube, R PICC (2)    LDA: G tube open to gravity, clamped w/ meds. J tube w/ continuous TFs @ 45 mL/hr + Relisorb.  R PICC (2) w/ purple lumen infusing TKo w/ insulin gtt   Labs: BG q1hrs on insulin gtt. Sliding scale correction insulin for BG >150 in MAR. Carb coverage 1:15   Pain: Abdominal/Chronic back pain managed w/ PRN oxy x2 + one time dilaudid dose   Activity: Up w/ assist x1  Plan: Continue POC. Insulin gtt coverage

## 2025-05-04 LAB
ALBUMIN SERPL BCG-MCNC: 3.2 G/DL (ref 3.5–5.2)
ALP SERPL-CCNC: 117 U/L (ref 40–150)
ALT SERPL W P-5'-P-CCNC: 47 U/L (ref 0–50)
ANION GAP SERPL CALCULATED.3IONS-SCNC: 11 MMOL/L (ref 7–15)
AST SERPL W P-5'-P-CCNC: 47 U/L (ref 0–45)
BILIRUB SERPL-MCNC: 0.2 MG/DL
BUN SERPL-MCNC: 12.5 MG/DL (ref 8–23)
CALCIUM SERPL-MCNC: 8.5 MG/DL (ref 8.8–10.4)
CHLORIDE SERPL-SCNC: 103 MMOL/L (ref 98–107)
CREAT SERPL-MCNC: 0.35 MG/DL (ref 0.51–0.95)
EGFRCR SERPLBLD CKD-EPI 2021: >90 ML/MIN/1.73M2
ERYTHROCYTE [DISTWIDTH] IN BLOOD BY AUTOMATED COUNT: 13.9 % (ref 10–15)
GLUCOSE BLDC GLUCOMTR-MCNC: 100 MG/DL (ref 70–99)
GLUCOSE BLDC GLUCOMTR-MCNC: 102 MG/DL (ref 70–99)
GLUCOSE BLDC GLUCOMTR-MCNC: 105 MG/DL (ref 70–99)
GLUCOSE BLDC GLUCOMTR-MCNC: 106 MG/DL (ref 70–99)
GLUCOSE BLDC GLUCOMTR-MCNC: 109 MG/DL (ref 70–99)
GLUCOSE BLDC GLUCOMTR-MCNC: 136 MG/DL (ref 70–99)
GLUCOSE BLDC GLUCOMTR-MCNC: 141 MG/DL (ref 70–99)
GLUCOSE BLDC GLUCOMTR-MCNC: 147 MG/DL (ref 70–99)
GLUCOSE BLDC GLUCOMTR-MCNC: 150 MG/DL (ref 70–99)
GLUCOSE BLDC GLUCOMTR-MCNC: 154 MG/DL (ref 70–99)
GLUCOSE BLDC GLUCOMTR-MCNC: 163 MG/DL (ref 70–99)
GLUCOSE BLDC GLUCOMTR-MCNC: 174 MG/DL (ref 70–99)
GLUCOSE BLDC GLUCOMTR-MCNC: 194 MG/DL (ref 70–99)
GLUCOSE BLDC GLUCOMTR-MCNC: 75 MG/DL (ref 70–99)
GLUCOSE BLDC GLUCOMTR-MCNC: 82 MG/DL (ref 70–99)
GLUCOSE BLDC GLUCOMTR-MCNC: 96 MG/DL (ref 70–99)
GLUCOSE BLDC GLUCOMTR-MCNC: 98 MG/DL (ref 70–99)
GLUCOSE SERPL-MCNC: 114 MG/DL (ref 70–99)
HCO3 SERPL-SCNC: 24 MMOL/L (ref 22–29)
HCT VFR BLD AUTO: 31.7 % (ref 35–47)
HGB BLD-MCNC: 10.7 G/DL (ref 11.7–15.7)
MCH RBC QN AUTO: 31.9 PG (ref 26.5–33)
MCHC RBC AUTO-ENTMCNC: 33.8 G/DL (ref 31.5–36.5)
MCV RBC AUTO: 95 FL (ref 78–100)
PLATELET # BLD AUTO: 408 10E3/UL (ref 150–450)
POTASSIUM SERPL-SCNC: 3.9 MMOL/L (ref 3.4–5.3)
PROT SERPL-MCNC: 5.8 G/DL (ref 6.4–8.3)
RBC # BLD AUTO: 3.35 10E6/UL (ref 3.8–5.2)
SODIUM SERPL-SCNC: 138 MMOL/L (ref 135–145)
WBC # BLD AUTO: 15.3 10E3/UL (ref 4–11)

## 2025-05-04 PROCEDURE — 99233 SBSQ HOSP IP/OBS HIGH 50: CPT

## 2025-05-04 PROCEDURE — 250N000012 HC RX MED GY IP 250 OP 636 PS 637

## 2025-05-04 PROCEDURE — S9342 HIT ENTERAL PUMP DIEM: HCPCS

## 2025-05-04 PROCEDURE — 36592 COLLECT BLOOD FROM PICC: CPT

## 2025-05-04 PROCEDURE — 99233 SBSQ HOSP IP/OBS HIGH 50: CPT | Mod: GC | Performed by: STUDENT IN AN ORGANIZED HEALTH CARE EDUCATION/TRAINING PROGRAM

## 2025-05-04 PROCEDURE — 85027 COMPLETE CBC AUTOMATED: CPT

## 2025-05-04 PROCEDURE — 250N000011 HC RX IP 250 OP 636: Performed by: INTERNAL MEDICINE

## 2025-05-04 PROCEDURE — 120N000002 HC R&B MED SURG/OB UMMC

## 2025-05-04 PROCEDURE — 258N000001 HC RX 258

## 2025-05-04 PROCEDURE — 250N000013 HC RX MED GY IP 250 OP 250 PS 637

## 2025-05-04 PROCEDURE — 250N000013 HC RX MED GY IP 250 OP 250 PS 637: Performed by: STUDENT IN AN ORGANIZED HEALTH CARE EDUCATION/TRAINING PROGRAM

## 2025-05-04 PROCEDURE — 82947 ASSAY GLUCOSE BLOOD QUANT: CPT

## 2025-05-04 RX ADMIN — POLYETHYLENE GLYCOL 3350 17 G: 17 POWDER, FOR SOLUTION ORAL at 15:01

## 2025-05-04 RX ADMIN — HYDROCORTISONE 15 MG: 10 TABLET ORAL at 19:31

## 2025-05-04 RX ADMIN — TOPIRAMATE 100 MG: 100 TABLET, FILM COATED ORAL at 19:30

## 2025-05-04 RX ADMIN — CYCLOBENZAPRINE HYDROCHLORIDE 5 MG: 5 TABLET, FILM COATED ORAL at 08:12

## 2025-05-04 RX ADMIN — PANCRELIPASE 5 CAPSULE: 60000; 12000; 38000 CAPSULE, DELAYED RELEASE PELLETS ORAL at 10:40

## 2025-05-04 RX ADMIN — ACETAMINOPHEN 975 MG: 325 TABLET ORAL at 15:01

## 2025-05-04 RX ADMIN — DRONABINOL 5 MG: 5 CAPSULE ORAL at 19:30

## 2025-05-04 RX ADMIN — DRONABINOL 5 MG: 5 CAPSULE ORAL at 07:58

## 2025-05-04 RX ADMIN — GABAPENTIN 100 MG: 100 CAPSULE ORAL at 19:30

## 2025-05-04 RX ADMIN — Medication 15 ML: at 07:59

## 2025-05-04 RX ADMIN — ACETAMINOPHEN 975 MG: 325 TABLET ORAL at 21:51

## 2025-05-04 RX ADMIN — OXYCODONE HYDROCHLORIDE 10 MG: 5 TABLET ORAL at 19:33

## 2025-05-04 RX ADMIN — INSULIN ASPART 4 UNITS: 100 INJECTION, SOLUTION INTRAVENOUS; SUBCUTANEOUS at 11:34

## 2025-05-04 RX ADMIN — THIAMINE HCL TAB 100 MG 100 MG: 100 TAB at 07:58

## 2025-05-04 RX ADMIN — DEXTROSE: 10 SOLUTION INTRAVENOUS at 05:16

## 2025-05-04 RX ADMIN — METOCLOPRAMIDE 10 MG: 10 TABLET ORAL at 07:58

## 2025-05-04 RX ADMIN — PANCRELIPASE 5 CAPSULE: 60000; 12000; 38000 CAPSULE, DELAYED RELEASE PELLETS ORAL at 15:00

## 2025-05-04 RX ADMIN — DULOXETINE HYDROCHLORIDE 30 MG: 30 CAPSULE, DELAYED RELEASE ORAL at 07:59

## 2025-05-04 RX ADMIN — INSULIN ASPART 9 UNITS: 100 INJECTION, SOLUTION INTRAVENOUS; SUBCUTANEOUS at 19:35

## 2025-05-04 RX ADMIN — SUCRALFATE 1 G: 1 TABLET ORAL at 21:51

## 2025-05-04 RX ADMIN — GABAPENTIN 100 MG: 100 CAPSULE ORAL at 15:01

## 2025-05-04 RX ADMIN — METOCLOPRAMIDE 10 MG: 10 TABLET ORAL at 19:31

## 2025-05-04 RX ADMIN — TOPIRAMATE 100 MG: 100 TABLET, FILM COATED ORAL at 07:58

## 2025-05-04 RX ADMIN — GABAPENTIN 100 MG: 100 CAPSULE ORAL at 07:59

## 2025-05-04 RX ADMIN — OXYCODONE HYDROCHLORIDE 10 MG: 5 TABLET ORAL at 23:55

## 2025-05-04 RX ADMIN — LEVOTHYROXINE SODIUM 125 MCG: 125 TABLET ORAL at 07:59

## 2025-05-04 RX ADMIN — HYDROCORTISONE 10 MG: 10 TABLET ORAL at 07:58

## 2025-05-04 RX ADMIN — SUCRALFATE 1 G: 1 TABLET ORAL at 07:59

## 2025-05-04 RX ADMIN — DULOXETINE 60 MG: 60 CAPSULE, DELAYED RELEASE ORAL at 07:57

## 2025-05-04 RX ADMIN — SENNOSIDES AND DOCUSATE SODIUM 1 TABLET: 50; 8.6 TABLET ORAL at 07:57

## 2025-05-04 RX ADMIN — SUCRALFATE 1 G: 1 TABLET ORAL at 13:08

## 2025-05-04 RX ADMIN — OXYCODONE HYDROCHLORIDE 10 MG: 5 TABLET ORAL at 15:04

## 2025-05-04 RX ADMIN — PANTOPRAZOLE SODIUM 40 MG: 40 TABLET, DELAYED RELEASE ORAL at 07:59

## 2025-05-04 RX ADMIN — INSULIN ASPART 9 UNITS: 100 INJECTION, SOLUTION INTRAVENOUS; SUBCUTANEOUS at 15:36

## 2025-05-04 RX ADMIN — INSULIN ASPART 1 UNITS: 100 INJECTION, SOLUTION INTRAVENOUS; SUBCUTANEOUS at 08:06

## 2025-05-04 RX ADMIN — METOCLOPRAMIDE 10 MG: 10 TABLET ORAL at 15:01

## 2025-05-04 RX ADMIN — Medication 5 ML: at 10:13

## 2025-05-04 RX ADMIN — ACETAMINOPHEN 975 MG: 325 TABLET ORAL at 07:08

## 2025-05-04 RX ADMIN — INSULIN HUMAN 4 UNITS: 100 INJECTION, SUSPENSION SUBCUTANEOUS at 11:29

## 2025-05-04 RX ADMIN — POTASSIUM CHLORIDE 20 MEQ: 750 TABLET, EXTENDED RELEASE ORAL at 07:58

## 2025-05-04 RX ADMIN — FUROSEMIDE 40 MG: 40 TABLET ORAL at 07:58

## 2025-05-04 RX ADMIN — OXYCODONE HYDROCHLORIDE 10 MG: 5 TABLET ORAL at 05:10

## 2025-05-04 RX ADMIN — LINACLOTIDE 145 MCG: 145 CAPSULE, GELATIN COATED ORAL at 09:03

## 2025-05-04 RX ADMIN — PANTOPRAZOLE SODIUM 40 MG: 40 TABLET, DELAYED RELEASE ORAL at 18:12

## 2025-05-04 RX ADMIN — POLYETHYLENE GLYCOL 3350 17 G: 17 POWDER, FOR SOLUTION ORAL at 07:59

## 2025-05-04 RX ADMIN — SUCRALFATE 1 G: 1 TABLET ORAL at 18:12

## 2025-05-04 RX ADMIN — PANCRELIPASE 5 CAPSULE: 60000; 12000; 38000 CAPSULE, DELAYED RELEASE PELLETS ORAL at 19:05

## 2025-05-04 RX ADMIN — OXYCODONE HYDROCHLORIDE 10 MG: 5 TABLET ORAL at 09:03

## 2025-05-04 RX ADMIN — FAMOTIDINE 20 MG: 20 TABLET, FILM COATED ORAL at 21:51

## 2025-05-04 ASSESSMENT — ACTIVITIES OF DAILY LIVING (ADL)
ADLS_ACUITY_SCORE: 47

## 2025-05-04 NOTE — PLAN OF CARE
Goal Outcome Evaluation:      Plan of Care Reviewed With: patient    Overall Patient Progress: no changeOverall Patient Progress: no change     Temp: 98.1  F (36.7  C) Temp src: Oral BP: 106/68 Pulse: 89   Resp: 16 SpO2: 100 % O2 Device: None (Room air)       Pt is A&Ox4 on RA, VSS. Constant abdominal pain managed with Oxycodone, flexiril + scheduled meds. Denies nausea. Or Regular diet, tolerating. TF continuous infusing @ goal rate with relizorp attached to TF. Assist x1 with walker & GB. Voiding adequately. +BS, passing gas, no BM yet. Pt is on insulin gtt- algorithm # 1. BG Q1H and carb coverage 1:12. Continue with POC.

## 2025-05-04 NOTE — PROGRESS NOTES
Care Management Follow Up    Length of Stay (days): 4    Expected Discharge Date: 05/05/2025     Concerns to be Addressed: discharge planning     Patient plan of care discussed at interdisciplinary rounds: NA    Anticipated Discharge Disposition: Home, Home Care, Home Infusion    Anticipated Discharge Services: Home Care  Anticipated Discharge DME: None    Patient/family educated on Medicare website which has current facility and service quality ratings: no  Education Provided on the Discharge Plan: No  Patient/Family in Agreement with the Plan: yes    Referrals Placed by CM/SW: Home Infusion, Homecare  Private pay costs discussed:  not at this encounter    Discussed  Partnership in Safe Discharge Planning  document with patient/family: No     Handoff Completed: Yes, [facility] PCP: Internal handoff referral completed    Additional Information:  From H&P: Chantell Kidd is a 59-year-old female with PMHx of 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 TPAIT 2012.    PEG replaced 5/2. Patient remains on insulin drip. LE edema improved. Has PICC 04/30/25 Double Lumen Right Brachial vein.    RNCC updated [facility] that patient is not leaving to day. Will be RAMAN of enteral.   Sent email to Dayan Meraz and copied primary RNCC of delay in discharge (RAMAN).       Discharge resources:     [facility] Enter[facility] only (RAMAN)  Phone: [phone]  Fax: [phone]     [facility] (RN/PT/OT)  Phone: [phone]  Fax: [phone]    [facility] for DME- W/C (already has at home)  Phone: [phone]  Fax: [phone]    Next Steps:   [] Needs resumption orders for home care  [] Update [facility] liaison for resumption of enteral.   [] IMM  IHO completed  Family to transport.      Herminia Mason RNCC Float  5/4/2025  Nurse Coordinator   Covering for 7B  Phone [phone]     Social Work and Care Management Department   SEARCHABLE in AMCOM - search CARE COORDINATOR   [facility] ([phone]) Saturday & Sunday; ([phone]) [facility] Recognized Holidays   Units: 5A Onc [phone] - [phone] RNCC,  5A Onc 5220 thru 5240 RNCC, 5C OFFSERVICE [phone] RNCC & 5C OFF SERVICE [phone] RNCC   Units: 6B Vocera, 6C Card 6401 thru 6420 RNCC, 6C Card 6502 thru 6514 RNCC & 6C Card 6515 thru 6519 RNCC    Units: 7A SOT RNCC Vocera, 7B Med Surg Vocera, 7C Med Surg 7401 thru 7418 RNCC & 7C Med Surg 7502 thru 7521 RNCC   Units: 6A Vocera & 4A CVICU Vocera, 4C MICU Vocera, and 4E SICU Vocera     Units: 5 Ortho Vocera & 5 Med Surg Vocera    Units: 6 Med Surg Vocera & 8 Med Surg Vocera

## 2025-05-04 NOTE — PLAN OF CARE
Goal Outcome Evaluation:      Plan of Care Reviewed With: patient    Overall Patient Progress: no changeOverall Patient Progress: no change       /61 (BP Location: Left arm)   Pulse 93   Temp 98.7  F (37.1  C) (Oral)   Resp 18   Wt 46.2 kg (101 lb 12.8 oz)   SpO2 100%   BMI 18.62 kg/m       Activity: Ax1  Neuros: A&Ox4  Cardiac: WDL  Respiratory: WDL, denies SOB  GI/: voids spontaneously, up to commode, no BM, passing gas  Diet: Regular w/ TF @ 45ml/hr  Skin/Incisions: WDL  Lines/Drains: R PICC D10 for BG below 100, insulin drip ordered for BG >100, J tube w/ feeds and relizorb w/ change due at 1300, G tube to gravity  Pain/Nausea: pain managed w/ oxy & flexril, denies nausea  New Changes: BG checks Q1H on insulin gtt on algorithm 1 only, BG dipped to 75 and started on D10, latest , currently infusing TKO w/ insulin and D10 at 10ml/hr

## 2025-05-04 NOTE — PROGRESS NOTES
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 She now has leakage of fluid which she took PO around the site of PEG tube, along with surrounding erythema and abdominal pain. She endorses severe 10/10 lower abdominal pain and back. Lipase <7 with AST/ ALT/ alkaline phosphatase downtrending from prior admission. CTAP showing no acute intrabdominal pathology with PEG-J coiled in the stomach, along with large colonic stool burden. Has been having recurrent admissions for abdominal pain with pain team consulted during prior admissions. Pain could be 2/2 PEG-J displacement vs constipation vs steatorrhea 2/2 malabsorption. PEG-J replaced by GI on 05/02.   - GI consulted  - RD consulted   - Creon for TF coverage   - Thiamine 100mg x 5-7 days  - Pain plan:   - acetaminophen 975 mg PO q8h              - flexeril 5 mg PO TID PRN              - oxycodone 5 mg PO q4h PRN increased to 10mg              - Started gabapentin 100 mg TID    - Constipation:   -Scheduled miralax and scheduled senna  - Continue PTA famotidine, protonix, linaclotide, reglan and sucralfate  - Zofran PRN for nausea    #Pneumobilia  Patient found to have persistent small amount of pneumobilia on  abdominal Xray post PEG-J replacement, similar to prior presentation. With patient being afebrile, without leukocytosis without any localized RUQ pain, lower concern for cholecystitis. Low concern for perforation as a potential cause with pt not having rebound tenderness or rigidity. Pneumobilia could be 2/2 new PEG-J replacement.  -Continue to monitor for now  -If acutely worsening abdominal pain, will consider abdominal imaging and surgery consult.    # Post-pancreatectomy diabetes (Type 1)  # TPAIT (2012)  Patient presented with BG of 747 with HbA1c of 15.8. Did not have an angion gap. She was previously admitted with BG of 900 with HbA1c of 19.1 (4/15) and was seen by endocrinology inpatient. During that admission there was concern for patient not taking her dose of insulin at that HbA1c. She was discharged on Lantus 3U, carb correction of 1U per 14g, sliding scale insulin as well as NPH 4U for TF which she reported taking, althought frequently had blood glucose above 500 upon checks at home. She was previously seen outpatient by both endocrinology and PCP. Started on insulin gtt and endocrinology was consulted.  - Endocrinology consulted  Stop IV Insulin drip (non-DKA protocol) 2 hours after NPH dose  - Start NPH 4 units q 24 hours at 1000 to cover TF. Hold if TF on hold.  - Start  NPH 3 units q 24 hours at 2200 to cover TF. Hold if TF on hold.   - Lantus 4 units q 24 hrs at 1800.  - Change Novolog Meal Coverage: 1 unit per 12 --> 14 grams CHO, TID AC and 1:15 PRN with snacks/supplements  - Restart: Novolog Correction Scale: 1:75>150 q4 hours --> TID AC, HS, 0200 at 1400  - BG Monitoring: q4 hours  - Hypoglycemia protocol  - PRN D10 @ 10-50 mL/hr. Start if BG <100 at 10 ml/hr and titrate by 10 ml to maintain BG between 100-150. Check blood sugars hourly while running dextrose fluids. If BG not at goal of 100-150 mg/dL after 1 hour, increase rate by 5mL/hr. If BG >120 mg/dL, decrease by 5 mL/hr. Stop fluids if   mg/dL or greater. Stop fluids if BG >120 mg/dL and D10 is at 10mL/hr.    - Carb counting protocol    - Lantus 4U  - Insulin gtt if BGs >300 x 2 checks.   - Carb coverage 1 units per 12 g CHO, TID AC and PRN with snacks/supplements    - LDSSI  - Hypoglycemia protocol    - Will begin Creon 12,000-38,000 -60,000 unit delayed-release capsule -Take 8 capsules (96,000 units of lipase total) by mouth 3 (three) times per day with meals.     #Lower extremity edema, improved  Patient presented with bilateral tender LE edema which is present at baseline but has worsened. No prior history of heart failure. No trauma, no erythema or warmth, however, is tender to palpation. LE US negative for DVT. Prior TTE in our system is from 2016, although patient looks euvolemic on physical exam. Cr normal without proteinuria on UA, reassuring against nephrotic syndrome. TTE WNL.  -Lymphedema wraps  -Continue PTA lasix 40mg (prescribed by provider in [place])     # History of adrenal insufficiency  Followed by Dr. Maher. Previously suppressed AM cortisol and has been on empiric therapy for possible adrenal insufficiency, unclear if central vs transient. Most recent clinic note describes inability to wean steroids due to hypoglycemia episodes.   - Continue PTA hydrocortisone 10mg in the AM and 15mg in the PM    #Hypothyroidism  -Continue PTA levothyroxine    #MDD  -Continue PTA duloxetine     Diet:  Regular. Hold TF  DVT Prophylaxis: Pneumatic Compression Devices  Mckee Catheter: Not present  Fluids: None  Lines: PRESENT      PICC 04/30/25 Double Lumen Right Brachial vein medial-Site Assessment: WDL      Cardiac Monitoring: None  Code Status: Full Code             Diet: Regular Diet Adult  Adult Formula Drip Feeding: Continuous [employer] Peptide 1.5; Jejunostomy; Goal Rate: 45; mL/hr; see relizorb cartridge order    DVT Prophylaxis: Low Risk/Ambulatory with no VTE prophylaxis indicated  Mckee Catheter: Not present  Fluids: PO, tube  feeds  Lines: PRESENT      PICC 04/30/25 Double Lumen Right Brachial vein medial-Site Assessment: WDL      Cardiac Monitoring: None  Code Status: Full Code      Clinically Significant Risk Factors               # Hypoalbuminemia: Lowest albumin = 3.1 g/dL at 5/3/2025  7:54 AM, will monitor as appropriate                 # Severe Malnutrition: based on nutrition assessment and treatment provided per dietitian's recommendations., PRESENT ON ADMISSION   # Financial/Environmental Concerns: none         Social Drivers of Health   Food Insecurity: Low Risk  (5/4/2025)    Food Insecurity     Within the past 12 months, did you worry that your food would run out before you got money to buy more?: No     Within the past 12 months, did the food you bought just not last and you didn t have money to get more?: No   Recent Concern: Food Insecurity - High Risk (4/16/2025)    Food Insecurity     Within the past 12 months, did you worry that your food would run out before you got money to buy more?: Yes     Within the past 12 months, did the food you bought just not last and you didn t have money to get more?: Yes   Depression: At risk (1/23/2025)    PHQ-2     PHQ-2 Score: 6         Disposition Plan     Medically Ready for Discharge: Anticipated Tomorrow         The patient's care was discussed with the Attending Physician, Dr. Limon .    Larissa Gibson MD  Medicine Service, [address]  Securely message with [employer] (more info)  Text page via [employer] Paging/Directory   See signed in provider for up to date coverage information  ______________________________________________________________________    Interval History   Reports improvement in abdominal pain since having a sizable bowel movement. No other concerns today, will switch insulin plan. Patient informed she will likely go home tomorrow.     Physical Exam   Vital Signs: Temp: 98.7  F (37.1  C) Temp src: Oral BP:  112/61 Pulse: 93   Resp: 18 SpO2: 100 % O2 Device: None (Room air)    Weight: 101 lbs 12.8 oz    General Appearance:  Alert and oriented x3. Interactive, comfortable appearing  Respiratory: Clear to auscultation bilaterally  Cardiovascular: Regular rate and rhythm  GI: soft, non-distended, dressing over PEG-J site. Lower abdominal quadrant tenderness without an y involuntary guarding or rebound.   Skin: No bruising on exposed skin    Medical Decision Making       Please see A&P for additional details of medical decision making.      Data     I have personally reviewed the following data over the past 24 hrs:    15.3 (H)  \   10.7 (L)   / 408     138 103 12.5 /  174 (H)   3.9 24 0.35 (L) \     ALT: 47 AST: 47 (H) AP: 117 TBILI: 0.2   ALB: 3.2 (L) TOT PROTEIN: 5.8 (L) LIPASE: N/A       Imaging results reviewed over the past 24 hrs:   No results found for this or any previous visit (from the past 24 hours).

## 2025-05-04 NOTE — PROGRESS NOTES
Brief GI note    Patient continues to have some abdominal pain at the site of the PEJ placement but she tolerated tube feeds through the PEJ.  She is eating by mouth and having nausea when she does eat by mouth but she is eating for pleasure.  Hemoglobin is stable.  The abdominal PEG and PEJ sites both without sign of cellulitis.    Adeel Saunders (Nehemias Dolan)DO MS  Gastroenterology Fellow  [facility]  Text Page or Vocera

## 2025-05-04 NOTE — PROGRESS NOTES
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 Inpatient Diabetes Service consulted for management of hyperglycemia.          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 Poor control  (A1c 19.1 % 4/16/25, Hgb: 11.9)  TF/Steroid induced Hyperglycemia    Plan/Recommendations:   - Stop IV Insulin drip (non-DKA protocol) 2 hours after NPH dose  - Start NPH 4 units q 24 hours at 1000 to cover TF. Hold if TF on hold.  - Start  NPH 3 units q 24 hours at 2200 to cover TF. Hold if TF on hold.   - Lantus 4 units q 24 hrs at 1800.  - Change Novolog Meal Coverage: 1 unit per 12 --> 14 grams CHO, TID AC and 1:15 PRN with snacks/supplements  - Restart: Novolog Correction Scale: 1:75>150 q4 hours --> TID AC, HS, 0200 at 1400  - BG Monitoring: q4 hours  - Hypoglycemia protocol  - PRN D10 @ 10-50 mL/hr. Start if BG <100 at 10 ml/hr and titrate by 10 ml to maintain BG between 100-150. Check blood sugars hourly while running dextrose fluids. If BG not at goal of 100-150 mg/dL after 1 hour, increase rate by 5mL/hr. If BG >120 mg/dL, decrease by 5 mL/hr. Stop fluids if  mg/dL or greater. Stop fluids if BG >120 mg/dL and D10 is at 10mL/hr.    - Carb counting protocol     Discussion:   Dexamethasone should be out of pt's system today. Will decrease carb coverage. Tight control overnight with BG dropping below 100. PRN D10 started per order at 0500. No reports of interruption of TF. Suspect dexamethasone wearing off is contributing and  a typical physiologic dip. Given low needs overnight, would suspect NPH dose for TF should be reduced compared to estimations yesterday. Discussed with pt restarting PTA NPH dosing. She is hesitant to do this, but is willing to try.     From nursing comment on MAR, there was a malfunction in insulin administration due to pen needle malfunction. Pt likely only received 4 units of Novolog to cover 156g cho (should have received 11 units. Requested RN check BG 2-3 hours after Novolog administration to determine if a one time correction dose is needed.     Discharge Planning: (tentative)  Medications: TBD. Likely adjustments in PTA medications  Test Claims: None needed. See notes from KATHY Ayala 4/18/25. Pt needs to go through specialty pharmacy for dexcom fills as they are not covered under her Medicare Part D plan.   Education: Needs to be assessed closer to discharge.   Outpatient Follow-up: recommend [facility]; next scheduled 5/13/25 with Libby Jay RN and 8/21/25 with Dr. Maher     Please notify Inpatient Diabetes Service if changes are planned to steroids, nutrition, TPN/TF and anticipated procedures requiring prolonged NPO status.         Interval History/Review of Systems :   The last 24 hours progress and nursing notes reviewed.  - No acute events overnight.  - Significant abdominal pain yesterday.   - + Constipation    Planned Procedures/Surgeries: none    Inpatient Glucose Control:       Recent Labs   Lab 05/04/25  1240 05/04/25  1132 05/04/25  1039 05/04/25  1004 05/04/25  0901 05/04/25  0806   * 150* 82 102* 141* 105*             Medications Impacting Glycemia:   Steroids:  maintenance hydrocortisone 10 mg am, 15 mg HS (adrenal insufficiency); Dexamethasone 10mg 5/2 at 0923    D5W-containing solutions/medications: none   Other medications impacting glucose: none         Nutrition:   Orders Placed This Encounter      Regular Diet Adult    Supplements: none   TF:  4/30- [employer]  "Peptide 1.5 @ 45 ml/hr- goal provides 149 g CHO per day. Stopped at 1500 due to TF coiled and Gtube exchange planned for 5/2 5/2 - present: Continuous [place] Peptide 1.5 @ 45 ml/hr- goal provides 149 g CHO per day started 2200  TPN: none         Diabetes History: see full consult note for complete diabetes history   Diabetes Type and Duration: Pancreatogenic diabetes s/p total pancreatectomy and islet autotransplant with insulin dependence since 1/2012  GAD65 antibody, zinc transporter 8 antibody, islet antibody, insulin antibody not available on epic search.     Numerous C-peptides:  Component      Latest Ref Rng 8/28/2018  6:47 AM 9/3/2018  6:41 PM 9/6/2018  8:00 AM 9/7/2018  6:55 AM 4/18/2019  11:04 AM 4/3/2025  2:01 PM   C-Peptide      0.9 - 6.9 ng/mL 0.3 (L)  0.2 (L)  1.8  2.8  1.8  0.5 (L)    Patient Fasting?           Yes       PTA Medication Regimen:   - NPH 4 units in AM, 3 units PM to cover TF  - Lantus 4 units daily at 1800  - Novolog ICR 1:14  - Novolog correction 1:50>175 q4-6 hours  Missing doses?: denies  Historical Diabetes Medications: MDI, insulin pumps     Glucose monitoring device/frequency/trends: Reports difficulty obtaining Dexcom and Omnipods, insurance is waiting on paperwork, her doctors's office says they sent it. H Accucheck (checking 4-6 times daily) running 400 to \"high\" for the past 4 days.   Hypoglycemia PTA:   - Frequency: none  - Severity: + hx of hypoglycemic seizures  - Awareness: absent/decreased  - Treatment: Orange juice.     Outpatient Diabetes Provider: [facility]ology: Dr. Maher (LOV 4/3/25)  Formal Diabetes Education/Educator: yes        Physical Exam:   /66   Pulse 93   Temp 98.7  F (37.1  C) (Oral)   Resp 18   Wt 46.2 kg (101 lb 12.8 oz)   SpO2 94%   BMI 18.62 kg/m    Constitutional: well-appearing patient in no acute distress.  Eyes: sclera anicteric.  Respiratory: No signs of labored breathing.          Data:     Lab Results   Component Value " Date    A1C 15.8 (H) 04/30/2025    A1C 19.1 (H) 04/15/2025    A1C 18.9 (H) 04/03/2025    A1C 17.1 (H) 01/23/2025    A1C 11.1 (H) 03/02/2023    A1C 7.3 (H) 11/09/2020    A1C 6.7 (A) 11/21/2019    A1C 8.2 (H) 06/11/2019    A1C Canceled, Test credited 06/10/2019    A1C 9.6 (H) 04/18/2019       ROUTINE IP LABS (Last four results)  BMP  Recent Labs   Lab 05/04/25  1240 05/04/25  1132 05/04/25  1039 05/04/25  1004 05/04/25  0806 05/04/25  0718 05/03/25  0846 05/03/25  0754 05/02/25  0732 05/02/25  0646 05/01/25  1101 05/01/25  1007   NA  --   --   --   --   --  138  --  137  --  133*  --  136   POTASSIUM  --   --   --   --   --  3.9  --  4.2  --  4.5  --  3.8   CHLORIDE  --   --   --   --   --  103  --  102  --  100  --  100   ELIZA  --   --   --   --   --  8.5*  --  8.4*  --  8.2*  --  8.6*   CO2  --   --   --   --   --  24  --  25  --  25  --  27   BUN  --   --   --   --   --  12.5  --  9.4  --  7.4*  --  5.5*   CR  --   --   --   --   --  0.35*  --  0.37*  --  0.40*  --  0.38*   * 150* 82 102*   < > 114*   < > 166*   < > 358*   < > 236*    < > = values in this interval not displayed.     CBC  Recent Labs   Lab 05/04/25  0718 05/03/25  0754 05/02/25  0646 05/01/25  1007   WBC 15.3* 10.2 6.3 7.8   RBC 3.35* 3.43* 3.75* 3.72*   HGB 10.7* 11.1* 11.7 11.7   HCT 31.7* 32.3* 35.0 34.9*   MCV 95 94 93 94   MCH 31.9 32.4 31.2 31.5   MCHC 33.8 34.4 33.4 33.5   RDW 13.9 13.8 13.6 13.2    474* 550* 587*     INR  Recent Labs   Lab 05/01/25  1007   INR 0.92       Inpatient Diabetes Service will continue to follow, please don't hesitate to contact the team with any questions or concerns.     Earlene Samuel PA-C  Inpatient Diabetes Service  Available on [employer] or Secure Chat     Plan discussed with patient, bedside RN, and primary team via this note.    To contact Inpatient Diabetes Service:     7 AM - 5 PM: Page the [employer] DAVID following the patient that day (see filed or incomplete progress notes/consult notes under  Endocrinology)    OR if uncertain of provider assignment: page job code [phone]    5 PM - 7 AM: First call after hours is to primary service.    For urgent after-hours questions, page job code for on call fellow: [phone]     I spent a total of 50 minutes on the date of the encounter doing prep/post-work, chart review, history and exam, documentation and further activities per the note including lab review, multidisciplinary communication, counseling the patient and/or coordinating care regarding acute hyper/hypoglycemic management, as well as discharge management and planning/communication.

## 2025-05-05 ENCOUNTER — DOCUMENTATION ONLY (OUTPATIENT)
Dept: INTERNAL MEDICINE | Facility: CLINIC | Age: 60
End: 2025-05-05
Payer: MEDICARE

## 2025-05-05 ENCOUNTER — APPOINTMENT (OUTPATIENT)
Dept: OCCUPATIONAL THERAPY | Facility: CLINIC | Age: 60
DRG: 637 | End: 2025-05-05
Payer: MEDICARE

## 2025-05-05 LAB
ALBUMIN SERPL BCG-MCNC: 3.3 G/DL (ref 3.5–5.2)
ALP SERPL-CCNC: 126 U/L (ref 40–150)
ALT SERPL W P-5'-P-CCNC: 37 U/L (ref 0–50)
ANION GAP SERPL CALCULATED.3IONS-SCNC: 11 MMOL/L (ref 7–15)
AST SERPL W P-5'-P-CCNC: 26 U/L (ref 0–45)
BILIRUB SERPL-MCNC: 0.2 MG/DL
BLAIMP ISLT/SPM QL: NOT DETECTED
BLAKPC ISLT/SPM QL: NOT DETECTED
BLAOXA-48 ISLT/SPM QL: NOT DETECTED
BLAVIM ISLT/SPM QL: NOT DETECTED
BUN SERPL-MCNC: 16.8 MG/DL (ref 8–23)
CALCIUM SERPL-MCNC: 8.7 MG/DL (ref 8.8–10.4)
CHLORIDE SERPL-SCNC: 101 MMOL/L (ref 98–107)
CREAT SERPL-MCNC: 0.37 MG/DL (ref 0.51–0.95)
EGFRCR SERPLBLD CKD-EPI 2021: >90 ML/MIN/1.73M2
ERYTHROCYTE [DISTWIDTH] IN BLOOD BY AUTOMATED COUNT: 13.9 % (ref 10–15)
GLUCOSE BLDC GLUCOMTR-MCNC: 113 MG/DL (ref 70–99)
GLUCOSE BLDC GLUCOMTR-MCNC: 147 MG/DL (ref 70–99)
GLUCOSE BLDC GLUCOMTR-MCNC: 174 MG/DL (ref 70–99)
GLUCOSE BLDC GLUCOMTR-MCNC: 200 MG/DL (ref 70–99)
GLUCOSE BLDC GLUCOMTR-MCNC: 223 MG/DL (ref 70–99)
GLUCOSE SERPL-MCNC: 143 MG/DL (ref 70–99)
HCO3 SERPL-SCNC: 26 MMOL/L (ref 22–29)
HCT VFR BLD AUTO: 30.8 % (ref 35–47)
HGB BLD-MCNC: 10.2 G/DL (ref 11.7–15.7)
MCH RBC QN AUTO: 32.1 PG (ref 26.5–33)
MCHC RBC AUTO-ENTMCNC: 33.1 G/DL (ref 31.5–36.5)
MCV RBC AUTO: 97 FL (ref 78–100)
NDM TARGET DNA: NOT DETECTED
PLATELET # BLD AUTO: 371 10E3/UL (ref 150–450)
POTASSIUM SERPL-SCNC: 4.1 MMOL/L (ref 3.4–5.3)
PROT SERPL-MCNC: 5.8 G/DL (ref 6.4–8.3)
RBC # BLD AUTO: 3.18 10E6/UL (ref 3.8–5.2)
SODIUM SERPL-SCNC: 138 MMOL/L (ref 135–145)
WBC # BLD AUTO: 11 10E3/UL (ref 4–11)

## 2025-05-05 PROCEDURE — G0463 HOSPITAL OUTPT CLINIC VISIT: HCPCS

## 2025-05-05 PROCEDURE — 36415 COLL VENOUS BLD VENIPUNCTURE: CPT

## 2025-05-05 PROCEDURE — 250N000013 HC RX MED GY IP 250 OP 250 PS 637

## 2025-05-05 PROCEDURE — 999N000007 HC SITE CHECK

## 2025-05-05 PROCEDURE — 250N000013 HC RX MED GY IP 250 OP 250 PS 637: Performed by: STUDENT IN AN ORGANIZED HEALTH CARE EDUCATION/TRAINING PROGRAM

## 2025-05-05 PROCEDURE — 99232 SBSQ HOSP IP/OBS MODERATE 35: CPT | Mod: GC | Performed by: STUDENT IN AN ORGANIZED HEALTH CARE EDUCATION/TRAINING PROGRAM

## 2025-05-05 PROCEDURE — 87798 DETECT AGENT NOS DNA AMP: CPT | Performed by: INTERNAL MEDICINE

## 2025-05-05 PROCEDURE — 80053 COMPREHEN METABOLIC PANEL: CPT

## 2025-05-05 PROCEDURE — 250N000011 HC RX IP 250 OP 636

## 2025-05-05 PROCEDURE — S9342 HIT ENTERAL PUMP DIEM: HCPCS

## 2025-05-05 PROCEDURE — 85027 COMPLETE CBC AUTOMATED: CPT

## 2025-05-05 PROCEDURE — 99233 SBSQ HOSP IP/OBS HIGH 50: CPT

## 2025-05-05 PROCEDURE — 250N000011 HC RX IP 250 OP 636: Performed by: INTERNAL MEDICINE

## 2025-05-05 PROCEDURE — 120N000002 HC R&B MED SURG/OB UMMC

## 2025-05-05 PROCEDURE — 97535 SELF CARE MNGMENT TRAINING: CPT | Mod: GO | Performed by: OCCUPATIONAL THERAPIST

## 2025-05-05 RX ORDER — OXYCODONE HYDROCHLORIDE 5 MG/1
5 TABLET ORAL EVERY 4 HOURS PRN
Status: DISCONTINUED | OUTPATIENT
Start: 2025-05-05 | End: 2025-05-06 | Stop reason: HOSPADM

## 2025-05-05 RX ORDER — GABAPENTIN 300 MG/1
300 CAPSULE ORAL 3 TIMES DAILY
Status: DISCONTINUED | OUTPATIENT
Start: 2025-05-05 | End: 2025-05-06

## 2025-05-05 RX ADMIN — CYCLOBENZAPRINE HYDROCHLORIDE 5 MG: 5 TABLET, FILM COATED ORAL at 02:26

## 2025-05-05 RX ADMIN — DRONABINOL 5 MG: 5 CAPSULE ORAL at 19:35

## 2025-05-05 RX ADMIN — PANTOPRAZOLE SODIUM 40 MG: 40 TABLET, DELAYED RELEASE ORAL at 09:09

## 2025-05-05 RX ADMIN — INSULIN ASPART 13 UNITS: 100 INJECTION, SOLUTION INTRAVENOUS; SUBCUTANEOUS at 21:51

## 2025-05-05 RX ADMIN — INSULIN ASPART 10 UNITS: 100 INJECTION, SOLUTION INTRAVENOUS; SUBCUTANEOUS at 09:30

## 2025-05-05 RX ADMIN — METOCLOPRAMIDE 10 MG: 10 TABLET ORAL at 19:35

## 2025-05-05 RX ADMIN — DRONABINOL 5 MG: 5 CAPSULE ORAL at 09:12

## 2025-05-05 RX ADMIN — DULOXETINE 60 MG: 60 CAPSULE, DELAYED RELEASE ORAL at 09:07

## 2025-05-05 RX ADMIN — HYDROCORTISONE 10 MG: 10 TABLET ORAL at 09:07

## 2025-05-05 RX ADMIN — PANCRELIPASE 5 CAPSULE: 60000; 12000; 38000 CAPSULE, DELAYED RELEASE PELLETS ORAL at 19:33

## 2025-05-05 RX ADMIN — GABAPENTIN 300 MG: 300 CAPSULE ORAL at 19:35

## 2025-05-05 RX ADMIN — PANCRELIPASE 5 CAPSULE: 60000; 12000; 38000 CAPSULE, DELAYED RELEASE PELLETS ORAL at 09:05

## 2025-05-05 RX ADMIN — HYDROCORTISONE 15 MG: 10 TABLET ORAL at 19:34

## 2025-05-05 RX ADMIN — OXYCODONE HYDROCHLORIDE 5 MG: 5 TABLET ORAL at 13:09

## 2025-05-05 RX ADMIN — OXYCODONE HYDROCHLORIDE 10 MG: 5 TABLET ORAL at 05:15

## 2025-05-05 RX ADMIN — METOCLOPRAMIDE 10 MG: 10 TABLET ORAL at 09:08

## 2025-05-05 RX ADMIN — SUCRALFATE 1 G: 1 TABLET ORAL at 12:38

## 2025-05-05 RX ADMIN — DULOXETINE HYDROCHLORIDE 30 MG: 30 CAPSULE, DELAYED RELEASE ORAL at 09:07

## 2025-05-05 RX ADMIN — FUROSEMIDE 40 MG: 40 TABLET ORAL at 09:06

## 2025-05-05 RX ADMIN — CYCLOBENZAPRINE HYDROCHLORIDE 5 MG: 5 TABLET, FILM COATED ORAL at 09:22

## 2025-05-05 RX ADMIN — OXYCODONE HYDROCHLORIDE 5 MG: 5 TABLET ORAL at 17:45

## 2025-05-05 RX ADMIN — SUCRALFATE 1 G: 1 TABLET ORAL at 16:03

## 2025-05-05 RX ADMIN — TOPIRAMATE 100 MG: 100 TABLET, FILM COATED ORAL at 19:35

## 2025-05-05 RX ADMIN — ONDANSETRON 4 MG: 2 INJECTION, SOLUTION INTRAMUSCULAR; INTRAVENOUS at 00:04

## 2025-05-05 RX ADMIN — OXYCODONE HYDROCHLORIDE 5 MG: 5 TABLET ORAL at 23:02

## 2025-05-05 RX ADMIN — LEVOTHYROXINE SODIUM 125 MCG: 125 TABLET ORAL at 09:22

## 2025-05-05 RX ADMIN — CYCLOBENZAPRINE HYDROCHLORIDE 5 MG: 5 TABLET, FILM COATED ORAL at 14:42

## 2025-05-05 RX ADMIN — INSULIN HUMAN 4 UNITS: 100 INJECTION, SUSPENSION SUBCUTANEOUS at 09:28

## 2025-05-05 RX ADMIN — ACETAMINOPHEN 975 MG: 325 TABLET ORAL at 14:42

## 2025-05-05 RX ADMIN — PANTOPRAZOLE SODIUM 40 MG: 40 TABLET, DELAYED RELEASE ORAL at 16:03

## 2025-05-05 RX ADMIN — METOCLOPRAMIDE 10 MG: 10 TABLET ORAL at 14:41

## 2025-05-05 RX ADMIN — POLYETHYLENE GLYCOL 3350 17 G: 17 POWDER, FOR SOLUTION ORAL at 14:19

## 2025-05-05 RX ADMIN — THIAMINE HCL TAB 100 MG 100 MG: 100 TAB at 09:06

## 2025-05-05 RX ADMIN — Medication 5 ML: at 12:23

## 2025-05-05 RX ADMIN — Medication 15 ML: at 09:03

## 2025-05-05 RX ADMIN — SENNOSIDES AND DOCUSATE SODIUM 2 TABLET: 50; 8.6 TABLET ORAL at 19:35

## 2025-05-05 RX ADMIN — ACETAMINOPHEN 975 MG: 325 TABLET ORAL at 05:14

## 2025-05-05 RX ADMIN — INSULIN ASPART 8 UNITS: 100 INJECTION, SOLUTION INTRAVENOUS; SUBCUTANEOUS at 14:47

## 2025-05-05 RX ADMIN — SUCRALFATE 1 G: 1 TABLET ORAL at 09:09

## 2025-05-05 RX ADMIN — SUCRALFATE 1 G: 1 TABLET ORAL at 21:49

## 2025-05-05 RX ADMIN — POTASSIUM CHLORIDE 20 MEQ: 750 TABLET, EXTENDED RELEASE ORAL at 09:12

## 2025-05-05 RX ADMIN — ACETAMINOPHEN 975 MG: 325 TABLET ORAL at 21:49

## 2025-05-05 RX ADMIN — LINACLOTIDE 145 MCG: 145 CAPSULE, GELATIN COATED ORAL at 09:04

## 2025-05-05 RX ADMIN — TOPIRAMATE 100 MG: 100 TABLET, FILM COATED ORAL at 09:13

## 2025-05-05 RX ADMIN — GABAPENTIN 300 MG: 300 CAPSULE ORAL at 14:42

## 2025-05-05 RX ADMIN — GABAPENTIN 100 MG: 100 CAPSULE ORAL at 09:07

## 2025-05-05 RX ADMIN — POLYETHYLENE GLYCOL 3350 17 G: 17 POWDER, FOR SOLUTION ORAL at 09:03

## 2025-05-05 RX ADMIN — POLYETHYLENE GLYCOL 3350 17 G: 17 POWDER, FOR SOLUTION ORAL at 19:36

## 2025-05-05 RX ADMIN — FAMOTIDINE 20 MG: 20 TABLET, FILM COATED ORAL at 21:49

## 2025-05-05 RX ADMIN — PANCRELIPASE 5 CAPSULE: 60000; 12000; 38000 CAPSULE, DELAYED RELEASE PELLETS ORAL at 12:27

## 2025-05-05 RX ADMIN — OXYCODONE HYDROCHLORIDE 10 MG: 5 TABLET ORAL at 09:22

## 2025-05-05 RX ADMIN — SENNOSIDES AND DOCUSATE SODIUM 2 TABLET: 50; 8.6 TABLET ORAL at 09:08

## 2025-05-05 ASSESSMENT — ACTIVITIES OF DAILY LIVING (ADL)
ADLS_ACUITY_SCORE: 53
ADLS_ACUITY_SCORE: 48
ADLS_ACUITY_SCORE: 53
ADLS_ACUITY_SCORE: 48
ADLS_ACUITY_SCORE: 53
ADLS_ACUITY_SCORE: 48
ADLS_ACUITY_SCORE: 53
ADLS_ACUITY_SCORE: 48
ADLS_ACUITY_SCORE: 53
ADLS_ACUITY_SCORE: 48
ADLS_ACUITY_SCORE: 53
ADLS_ACUITY_SCORE: 53
ADLS_ACUITY_SCORE: 48
ADLS_ACUITY_SCORE: 53
ADLS_ACUITY_SCORE: 53
ADLS_ACUITY_SCORE: 48
ADLS_ACUITY_SCORE: 47

## 2025-05-05 NOTE — PROGRESS NOTES
Infection Prevention Progress Note  5/5/2025      Patient Name: Chantell Kidd [id number]  Admit Date: 4/30/2025    Infection Status as of 5/5/2025 8:48 AM: Rule Out CP-CRE, Rule Out Candida auris  Isolation Status as of 5/5/2025 8:48 AM: Contact     CP- and Candida auris screening information    This patient was admitted to a hospital or long-term care facility in a high risk area in the prior 12 months. and is potentially at a high risk for carrying CP- and/or Candida auris. The [place] Department of Health ([facility]) and CDC recommend that we test this patient to prevent the spread of these organisms within our healthcare facility. Testing is voluntary and includes collecting an axilla and groin swab for C. auris and a rectal swab for CP-. Due to the potential transmission of these organisms in healthcare settings, Infection Prevention requires that this patient be placed in a private room on Contact Precautions until testing is complete. While the Candida auris testing is pending, bleach or an approved disinfectant (e.g. PDI Prime) should be used clean the patient s room and equipment.      Please notify Infection Prevention if the patient declines testing.  Additional information and resources can be found on the Infection Prevention [employer]point page.      If you have any questions, please contact Infection Prevention.    Lay Mary, Infection Prevention

## 2025-05-05 NOTE — PROGRESS NOTES
Type of Form Received: [facility] [id number], [id number]    Form Received (Date) 5/2/25   Form Filled out Yes, faxed 5/7   Placed in provider folder Yes

## 2025-05-05 NOTE — PROGRESS NOTES
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 She now has leakage of fluid which she took PO around the site of PEG tube, along with surrounding erythema and abdominal pain. She endorses severe 10/10 lower abdominal pain and back. Lipase <7 with AST/ ALT/ alkaline phosphatase downtrending from prior admission. CTAP showing no acute intrabdominal pathology with PEG-J coiled in the stomach, along with large colonic stool burden. Has been having recurrent admissions for abdominal pain with pain team consulted during prior admissions. Pain could be 2/2 PEG-J displacement vs constipation vs steatorrhea 2/2 malabsorption. PEG-J replaced by GI on 05/02.   - GI consulted  - RD consulted              - Creon for TF coverage              - Thiamine 100mg x 5-7 days  - Pain plan:              - acetaminophen 975 mg PO q8h              - flexeril 5 mg PO TID PRN              - oxycodone 5 mg PO q4h PRN               - gabapentin 300 mg TID    - Constipation:              -Scheduled miralax and scheduled senna  - Continue PTA famotidine, protonix, linaclotide, reglan and sucralfate  - Zofran PRN for nausea      #Pneumobilia  Patient found to have persistent small amount of pneumobilia on abdominal Xray post PEG-J replacement, similar to prior presentation. With patient being afebrile, without leukocytosis without any localized RUQ pain, lower concern for cholecystitis. Low concern for perforation as a potential cause with pt not having rebound tenderness or rigidity. Pneumobilia could be 2/2 new PEG-J replacement.  -Continue to monitor for now  -If acutely worsening abdominal pain, will consider abdominal imaging and surgery consult.     # Post-pancreatectomy diabetes (Type 1)  # TPAIT (2012)  Patient presented with BG of 747 with HbA1c of 15.8. Did not have an angion gap. She was previously admitted with BG of 900 with HbA1c of 19.1 (4/15) and was seen by endocrinology inpatient. During that admission there was concern for patient not taking her dose of insulin at that HbA1c. She was discharged on Lantus 3U, carb correction of 1U per 14g, sliding scale insulin as well as NPH 4U for TF which she reported taking, althought frequently had blood glucose above 500 upon checks at home. She was previously seen outpatient by both endocrinology and PCP. Started on insulin gtt and endocrinology was consulted.  - Endocrinology consulted  - NPH 4 units q 24 hours at 1000 to cover TF. Hold if TF on hold.  - NPH 3 units q 24 hours at 2200 to cover TF. Hold if TF on hold.   - Lantus 4 units q 24 hrs at 1800.  - Change Novolog Meal Coverage: 1 unit per 14 grams CHO, TID AC and 1:15 PRN with snacks/supplements  - Novolog Correction Scale: 1:150 q4 hours --> TID AC, HS, 0200 at 1400  - BG Monitoring: q4 hours  - Hypoglycemia protocol  - Carb counting protocol   - Will begin Creon 12,000-38,000 -60,000 unit delayed-release capsule -Take 8 capsules (96,000 units of lipase total) by mouth 3 (three) times per day with meals.      #Lower extremity edema, improved  Patient presented with bilateral tender LE edema which is present at baseline  but has worsened. No prior history of heart failure. No trauma, no erythema or warmth, however, is tender to palpation. LE US negative for DVT. Prior TTE in our system is from 2016, although patient looks euvolemic on physical exam. Cr normal without proteinuria on UA, reassuring against nephrotic syndrome. TTE WNL.  -Lymphedema wraps  -Continue PTA lasix 40mg (prescribed by provider in [place])     # History of adrenal insufficiency  Followed by Dr. Maher. Previously suppressed AM cortisol and has been on empiric therapy for possible adrenal insufficiency, unclear if central vs transient. Most recent clinic note describes inability to wean steroids due to hypoglycemia episodes.   - Continue PTA hydrocortisone 10mg in the AM and 15mg in the PM     #Hypothyroidism  -Continue PTA levothyroxine     #MDD  -Continue PTA duloxetine          Diet: Regular Diet Adult  Adult Formula Drip Feeding: Continuous [place] Peptide 1.5; Jejunostomy; Goal Rate: 45; mL/hr; see relizorb cartridge order    DVT Prophylaxis: Low Risk/Ambulatory with no VTE prophylaxis indicated  Mckee Catheter: Not present  Fluids: None  Lines: PRESENT      PICC 04/30/25 Double Lumen Right Brachial vein medial-Site Assessment: WDL      Cardiac Monitoring: None  Code Status: Full Code      Clinically Significant Risk Factors           # Hypocalcemia: Lowest Ca = 8.5 mg/dL in last 2 days, will monitor and replace as appropriate     # Hypoalbuminemia: Lowest albumin = 3.1 g/dL at 5/3/2025  7:54 AM, will monitor as appropriate                 # Severe Malnutrition: based on nutrition assessment and treatment provided per dietitian's recommendations.    # Financial/Environmental Concerns: none         Social Drivers of Health   Food Insecurity: Low Risk  (5/4/2025)    Food Insecurity     Within the past 12 months, did you worry that your food would run out before you got money to buy more?: No     Within the past 12 months, did the food you bought just not  last and you didn t have money to get more?: No   Recent Concern: Food Insecurity - High Risk (4/16/2025)    Food Insecurity     Within the past 12 months, did you worry that your food would run out before you got money to buy more?: Yes     Within the past 12 months, did the food you bought just not last and you didn t have money to get more?: Yes   Depression: At risk (1/23/2025)    PHQ-2     PHQ-2 Score: 6         Disposition Plan     Medically Ready for Discharge: Anticipated Tomorrow         The patient's care was discussed with the Attending Physician, Dr. Limon .    Aisha Shabbir, MD  Medicine Service, [facility] TEAM 1  [facility]  Securely message with [employer] (more info)  Text page via [facility] Paging/Directory   See signed in provider for up to date coverage information  ______________________________________________________________________    Interval History   NAEO. Patient has had 2 Bms overnight and has had an improvement in abdominal pain. However, endorses ongoing muscle pain around area of PEG-J tube with associated bloating.    Physical Exam   Vital Signs: Temp: 98.3  F (36.8  C) Temp src: Oral BP: 131/73 Pulse: 108   Resp: 18 SpO2: 98 % O2 Device: None (Room air)    Weight: 105 lbs 14.4 oz    General Appearance: Alert. Comfortable appearing. Responds to questions appropriately. Appears older than stated age.  GI: soft, nondistended, soreness in lower abdominal quadrants  Skin: Erythema around PEJ-G  Other: Moving extremities spontaneously    Medical Decision Making

## 2025-05-05 NOTE — PLAN OF CARE
Goal Outcome Evaluation:    A&O x4. VSS. RA. TF at goal 45ml/hr via J tube. Peg tube and J tube irrigated with 30ml of water. Gave oxycodone and flexeril for abdomen pain 9/10. Up to commode to void. Forgets to call when getting up, Bed alarm turned on. Picc tko for meds. Peg tube to gravity. Tolerating diet

## 2025-05-05 NOTE — CONSULTS
[facility]  WO Nurse Inpatient Assessment     Consulted for: PEG    WO nurse follow-up plan: weekly    Patient History (according to provider note(s):      59 year old female with a history of TPIAT (chronic pancreatitis), PEG-J dependence for gastric decompression and enteral nutrition, with recent coiled jejunal extension and replaced 4/17. GI AE now consulted for replacement of PEG-J as it appears to be coiled in the stomach again.     #. PEG-J tube displacement, recurrent  #. Abdominal pain   #. History of TPAIT (2012)  Presents with abdominal pain with evidence of coiled jejunal extension. Exchanged for same indication on 4/17. Since then time she has had increased abdominal bloating and worsening leakage around the tube. Has been vomiting as well. Previously discussed separate PEG and PEJ tubes with recurrent coiling (which unfortunately happens frequently in TPIAT anatomy).      Now s/p uncomplicated separate PEG/PEJ placement with Dr. Perdomo on 5/2/25.    Assessment:      Areas visualized during today's visit: Focused: and Abdomen    Tube type and location:  LUQ    Last photo 5/5  History: Pt has recurrent leakage around her PEG tube.   Current Tube Securement: Bumper  18 Fr tube with 1 lumens   Leakage around tube: moderate  Description of leakage/drainage: yellow  Insertion site assessment: Intact  Peritubular skin assessment: erosion of epidermis and erythema Moist  Injury extends 0.4 cm from insertion site      Palpation of the wound bed: normal      Wound Drainage: small     Description of drainage: serous  ?? Temperature? normal   Pain: moderate, aching  Pain interventions prior to dressing change: slow and gentle cares   STATUS: initial assessment  Supplies ordered: at bedside      Treatment Plan:     PEG tube wound(s): BID  and as needed.   Cleanse the area with Glenny cleanse and protect, very gently with soft cloth.  Apply ostomy powder (#[id number]) on  all open and denuded skin.  Apply thin layer of Barrier paste (ex: Critic aid) on top of it.  Place 2x2 split gauze between PEG bumper and skin.  With repeat application, do not scrub the paste, only remove soiled paste and reapply.  If complete removal of paste is necessary use baby oil/mineral oil (#[id number]) and soft wash cloth.        Orders: Written    RECOMMEND PRIMARY TEAM ORDER: None, at this time  Education provided: plan of care  Discussed plan of care with: Patient  Notify [facility] if wound(s) deteriorate.  Nursing to notify the Provider(s) and re-consult the [facility] Nurse if new skin concern.    DATA:     Current support surface: Standard  Standard gel mattress (Isoflex)  Containment of urine/stool: Incontinent pad in bed  BMI: Body mass index is 19.37 kg/m .   Active diet order: Orders Placed This Encounter      Regular Diet Adult     Output: I/O last 3 completed shifts:  In: 2365 [P.O.:1420]  Out: 1600 [Urine:1600]     Labs:   Recent Labs   Lab 05/05/25  0722 05/02/25  0646 05/01/25  1007 04/30/25  0530   ALBUMIN 3.3*   < > 3.5 3.8   HGB 10.2*   < > 11.7 10.5*   INR  --   --  0.92  --    WBC 11.0   < > 7.8 5.9   A1C  --   --   --  15.8*    < > = values in this interval not displayed.     Pressure injury risk assessment:   Sensory Perception: 4-->no impairment  Moisture: 3-->occasionally moist  Activity: 3-->walks occasionally  Mobility: 3-->slightly limited  Nutrition: 3-->adequate  Friction and Shear: 3-->no apparent problem  Neel Score: 19    Ron Azar RN, CWOCN  Pager no longer in use, please contact through [employer] group: [facility] Nurse [place]    Dept. Office Number: [phone]

## 2025-05-05 NOTE — PROGRESS NOTES
Care Management Discharge Note    Discharge Date: 05/05/2025 or 5/6     Discharge Disposition: Home, Home Care,   Discharge Services: Home Care, Home Infusion  [facility] - RN/PT/OT resumed, ordered  Ph:[phone]  Fx:[phone]    [facility] - TF resumed, ordered  Phone  [phone]  Fax  [phone]    [facility] Referral Hub     Discharge DME: None  Discharge Transportation: family or friend will provide  Private pay costs discussed: Not applicable  Education Provided on the Discharge Plan: Yes  Persons Notified of Discharge Plans: ACFV, FHI, patient  Patient/Family in Agreement with the Plan: patient prefers to discharge tomorrow AM  Handoff Referral Completed: Yes    Additional Information:  Per primary team patient is ready to discharge today pending endocrinology discharge recommendations.    [place] [facility] liaison updated  Savi [facility] liaison updated.     10:45 AM  Reviewed discharge planning with patient. Per pt, GI visited this AM and wants WOCN to look at wound area. They both discussed discharging tomorrow AM. Pt lives in [place].  Updated primary team.     Next Steps:   [] Updated ACFV and FHI of discharge date  [] IMM needed   [] D-C Ride:       Lu Squires, RN, [facility]  Inpatient Care Management - FLOAT

## 2025-05-05 NOTE — PROGRESS NOTES
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 Inpatient Diabetes Service consulted for management of hyperglycemia     Inpatient Diabetes Service Signing off 5/5/25  Please call back with any questions or if BG become more labile or if getting closer to discharge and assistance is needed for home recommendations.    Please allow at least 24 hours for home recs if they are not provided.   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 Poor control  (A1c 19.1 % 4/16/25, Hgb: 11.9)  TF/Steroid induced Hyperglycemia     Plan/Recommendations:   - Continue NPH 4 units q 24 hours at 1000 to cover TF. Hold if TF on hold.  - Continue NPH 3 units q 24 hours at 2200 to cover TF. Hold if TF on hold.   - Lantus 4 units q 24 hrs at 1800.  - Continue Novolog Meal Coverage: 1 unit per 14 grams CHO, TID AC and 1:15 PRN with snacks/supplements  - Continue Novolog Correction Scale: 1:75>150 TID AC, HS, 0200 at 1400  - BG Monitoring: q4 hours  - Hypoglycemia protocol  - PRN D10 @ 10-50 mL/hr. Start if BG <100 at 10 ml/hr and titrate by 10 ml to maintain BG between 100-150. Check blood sugars hourly while running dextrose fluids. If BG not at goal of 100-150 mg/dL after 1 hour, increase rate by 5mL/hr. If BG >120 mg/dL, decrease by 5 mL/hr. Stop fluids if  mg/dL or greater. Stop fluids if BG >120 mg/dL and D10 is at 10mL/hr.    - Carb counting protocol       Discussion:   BG mostly within range on near PTA regimen. Will continue current regimen.  Cr 0.37, GFR >90 Hgb 10.2 WBC 11.0    Diabetes Management Discharge Plan  Blood glucose monitoring: Restart CGM or check finger stick before meals and every 4-6 hours during eveining/overnight when getting tube feeds.       Glucose Control Regimen:  1) Long-acting insulin: glargine (Lantus) - take 4 units once daily at 6pm. Take at same time each day.     2) Rapid-acting insulin: aspart (Novolog) carbohydrate coverage/mealtime insulin - take 1 unit per 14 grams of carbohydrates before meals and snacks.     3) Rapid-acting insulin: aspart (Novolog) correction - see chart below. Take for high blood glucoses three times daily before meals when eating (or every 4-6 hours when receiving tube feeding) and at bedtime.  You may add the correction dose to the carbohydrate coverage/mealtime insulin dose and give in one injection--ideally 10-15 minutes before a meal.  You may take the correction dose even if you skip a meal (as long as it has been 4 hours since previous correction dose).      Blood Glucose Insulin Before Meals When Eating or every 4-6 hours when receiving tube feeding:   Less than 175 0 units   175 -224  1 units   225 - 274 2 units   275 - 324 3 units   325 - 374 4 units    375 - 424  5 units   425 - 474 6 units   475 or more 7 units         4) Intermediate-acting insulin: Novolin N (NPH). Take to cover tube feeds (dosed for [place] at 45 ml/hr)   - Take Novolin N (NPH) 4 units at 9AM   - Take Novolin N (NPH) 3 units at 9PM      (If your rate of tube feed were to decrease, you can reference the list below)  If TF rate at 20 ml per hour, give 1 units  If TF rate at 30 ml per hour, give 3 units  If TF rate from 40-45 ml/hr, give 5 units     Humulin N needs to be gently rolled/mixed before injecting.  Do not give the dose if the tube feeds will not run.    Hypoglycemia (Low Blood Glucose)  Management:  Signs/symptoms:  Shaking, sweating, fast heartbeat  Feeling dizzy, tired, or weak   Feeling anxious and easy to irritate  Feeling nervous, crabby, or confused  Hunger  Vision problems, headache  Numb or tingling mouth    If blood glucose is 51 to 70:   Eat or drink 15 grams of carbohydrate. Examples:   1/2 cup (4 ounces) apple juice or regular soda pop, or   1 cup (8 ounces) milk, or   15 skittles, or   3 to 4 glucose tablets.   Re-check your blood glucose in 15 minutes.   Repeat these steps every 15 minutes until your blood glucose is above 80.       If blood glucose is 50 or less:   Eat or drink 30 grams of carbohydrate. Examples:   1 cup (8 ounces) apple juice or regular soda pop, or   2 cups (16 ounces) milk, or   1 banana, or   6 to 8 glucose tablets.   Re-check your blood glucose in 15 minutes.   Repeat these steps every 15 minutes until your blood glucose is above 80.     Outpatient Follow-up: recommend [facility]; next scheduled 5/13/25 with Libby Jay RN and 8/21/25 with Dr. Maher      Discharge Planning:   Medications:  PTA medications  Test Claims: None needed. See notes from KATHY Ayala 4/18/25. Pt needs to go through specialty pharmacy for dexcom fills as they are not covered under her Medicare Part D plan.   Education: Needs to be assessed closer to discharge.   Outpatient Follow-up: recommend [facility]; next scheduled 5/13/25 with Libby Jay RN and 8/21/25 with Dr. Maher     Please notify Inpatient Diabetes Service if changes are planned to steroids, nutrition, TPN/TF and anticipated procedures requiring prolonged NPO status.         Interval History/Review of Systems :   The last 24 hours progress and nursing notes reviewed.  No events overnight.     Planned Procedures/Surgeries: none    Inpatient Glucose Control:       Recent Labs   Lab 05/05/25  0900 05/05/25  0722 05/05/25  0211 05/04/25  2149 05/04/25  1811 05/04/25  1617   * 143* 200* 147* 163*  "194*             Medications Impacting Glycemia:   Steroids:  maintenance hydrocortisone 10 mg am, 15 mg HS (adrenal insufficiency); Dexamethasone 10mg 5/2 at 0923    D5W-containing solutions/medications: none   Other medications impacting glucose: none         Nutrition:   Orders Placed This Encounter      Regular Diet Adult    Supplements: none   TF:  4/30- SagrarioResverlogix Peptide 1.5 @ 45 ml/hr- goal provides 149 g CHO per day. Stopped at 1500 due to TF coiled and Gtube exchange planned for 5/2 5/2 - present: Continuous Sagrario Kitara Media Peptide 1.5 @ 45 ml/hr- goal provides 149 g CHO per day started 2200  TPN: none         Diabetes History: see full consult note for complete diabetes history   Diabetes Type and Duration: Pancreatogenic diabetes s/p total pancreatectomy and islet autotransplant with insulin dependence since 1/2012  GAD65 antibody, zinc transporter 8 antibody, islet antibody, insulin antibody not available on epic search.     Numerous C-peptides:  Component      Latest Ref Rng 8/28/2018  6:47 AM 9/3/2018  6:41 PM 9/6/2018  8:00 AM 9/7/2018  6:55 AM 4/18/2019  11:04 AM 4/3/2025  2:01 PM   C-Peptide      0.9 - 6.9 ng/mL 0.3 (L)  0.2 (L)  1.8  2.8  1.8  0.5 (L)    Patient Fasting?           Yes       PTA Medication Regimen:   - NPH 4 units in AM, 3 units PM to cover TF  - Lantus 4 units daily at 1800  - Novolog ICR 1:14  - Novolog correction 1:50>175 q4-6 hours  Missing doses?: denies  Historical Diabetes Medications: MDI, insulin pumps     Glucose monitoring device/frequency/trends: Reports difficulty obtaining Dexcom and Omnipods, insurance is waiting on paperwork, her doctors's office says they sent it. H Accucheck (checking 4-6 times daily) running 400 to \"high\" for the past 4 days.   Hypoglycemia PTA:   - Frequency: none  - Severity: + hx of hypoglycemic seizures  - Awareness: absent/decreased  - Treatment: Orange juice.      Outpatient Diabetes Provider: [facility]: Dr. Maher (LOV " 4/3/25)  Formal Diabetes Education/Educator: yes           Physical Exam:   /73 (BP Location: Left arm)   Pulse 86   Temp 97.8  F (36.6  C) (Oral)   Resp 18   Wt 46.2 kg (101 lb 12.8 oz)   SpO2 99%   BMI 18.62 kg/m    Constitutional: well-appearing patient in no acute distress.  Eyes: sclera anicteric.  Respiratory: No signs of labored breathing.               Data:     Lab Results   Component Value Date    A1C 15.8 (H) 04/30/2025    A1C 19.1 (H) 04/15/2025    A1C 18.9 (H) 04/03/2025    A1C 17.1 (H) 01/23/2025    A1C 11.1 (H) 03/02/2023    A1C 7.3 (H) 11/09/2020    A1C 6.7 (A) 11/21/2019    A1C 8.2 (H) 06/11/2019    A1C Canceled, Test credited 06/10/2019    A1C 9.6 (H) 04/18/2019       ROUTINE IP LABS (Last four results)  BMP  Recent Labs   Lab 05/05/25  0900 05/05/25  0722 05/05/25  0211 05/04/25  2149 05/04/25  0806 05/04/25  0718 05/03/25  0846 05/03/25  0754 05/02/25  0732 05/02/25  0646   NA  --  138  --   --   --  138  --  137  --  133*   POTASSIUM  --  4.1  --   --   --  3.9  --  4.2  --  4.5   CHLORIDE  --  101  --   --   --  103  --  102  --  100   ELIZA  --  8.7*  --   --   --  8.5*  --  8.4*  --  8.2*   CO2  --  26  --   --   --  24  --  25  --  25   BUN  --  16.8  --   --   --  12.5  --  9.4  --  7.4*   CR  --  0.37*  --   --   --  0.35*  --  0.37*  --  0.40*   * 143* 200* 147*   < > 114*   < > 166*   < > 358*    < > = values in this interval not displayed.     CBC  Recent Labs   Lab 05/05/25  0722 05/04/25  0718 05/03/25  0754 05/02/25  0646   WBC 11.0 15.3* 10.2 6.3   RBC 3.18* 3.35* 3.43* 3.75*   HGB 10.2* 10.7* 11.1* 11.7   HCT 30.8* 31.7* 32.3* 35.0   MCV 97 95 94 93   MCH 32.1 31.9 32.4 31.2   MCHC 33.1 33.8 34.4 33.4   RDW 13.9 13.9 13.8 13.6    408 474* 550*     INR  Recent Labs   Lab 05/01/25  1007   INR 0.92       Inpatient Diabetes Service will continue to follow, please don't hesitate to contact the team with any questions or concerns.     Mahsa Jose, APRN  CNP    Plan discussed with patient, bedside RN, and primary team via this note.    To contact Inpatient Diabetes Service:     7 AM - 5 PM: Page the IDS DAVID following the patient that day (see filed or incomplete progress notes/consult notes under Endocrinology)    OR if uncertain of provider assignment: page job code [phone]    5 PM - 7 AM: First call after hours is to primary service.    For urgent after-hours questions, page job code for on call fellow: [phone]     I spent a total of 45 minutes on the date of the encounter doing prep/post-work, chart review, history and exam, documentation and further activities per the note including lab review, multidisciplinary communication, counseling the patient and/or coordinating care regarding acute hyper/hypoglycemic management, as well as discharge management and planning/communication.

## 2025-05-05 NOTE — PLAN OF CARE
[id number]:    Vital signs:  Temp: 97.8  F (36.6  C) Temp src: Oral BP: 122/73 Pulse 72  Resp: 16 SpO2: 100 % O2 Device: None (Room air)    Weight: 46.2 kg (101 lb 12.8 oz)    Problem: Adult Inpatient Plan of Care  Goal: Absence of Hospital-Acquired Illness or Injury  Intervention: Identify and Manage Fall Risk  Recent Flowsheet Documentation  Taken 5/5/2025 0004 by Marlena Linder RN  Safety Promotion/Fall Prevention:   activity supervised   increased rounding and observation   lighting adjusted   nonskid shoes/slippers when out of bed   patient and family education   room door open   room near nurse's station   room organization consistent   safety round/check completed   supervised activity     Problem: Pain Acute  Goal: Optimal Pain Control and Function  Outcome: Progressing  Intervention: Develop Pain Management Plan  Recent Flowsheet Documentation  Taken 5/5/2025 0227 by Marlena Linder RN  Pain Management Interventions: medication (see MAR)  Taken 5/4/2025 2355 by Marlena Linder RN  Pain Management Interventions: medication (see MAR)  Intervention: Prevent or Manage Pain  Recent Flowsheet Documentation  Taken 5/5/2025 0004 by Marlena Linder RN  Bowel Elimination Promotion: adequate fluid intake promoted  Medication Review/Management:   medications reviewed   high-risk medications identified     Problem: Diabetes  Goal: Blood Glucose Level Within Target Range  Outcome: Progressing  Intervention: Optimize Glycemic Control  Recent Flowsheet Documentation  Taken 5/5/2025 0004 by Marlena Linder RN  Hyperglycemia Management: blood glucose monitored     Activity: Up to commode with assist of one.   Neuros: A & O x4. Neuro intact.  Cardiac: WDL.   Respiratory: LS clear. O2 sats high 90s on RA. Unlabored.  GI/: BS+, passing flatus, had large soft brown BM. Voiding adequate, voided once in toilet.  Diet: Regular diet. TF running at 45 mL/hr via J-tube. Relizorb changed at midnight. Next Relizorb to be changed at 0800.  Lines: PICC TKO one  port, other port hep locked.  Drains: G-tube to gravity.  Labs: Reviewed.  at 0200, administered 1 unit sliding scale insulin.  Pain/nausea: PRN oxycodone 10mg x2 and Flexeril x1 for left abdominal and back pain control, slept in between cares. PRN Zofran x1 for nausea effective.  New changes this shift: None.  Plan: Continue POC.

## 2025-05-05 NOTE — PROGRESS NOTES
GASTROENTEROLOGY SIGN OFF NOTE    Date of Admission: 4/30/2025  Reason for Admission: Leg swelling and abdominal pain      ASSESSMENT:  59 year old female with a history of TPIAT (chronic pancreatitis), PEG-J dependence for gastric decompression and enteral nutrition, with recent coiled jejunal extension and replaced 4/17. GI AE now consulted for replacement of PEG-J as it appears to be coiled in the stomach again.     #. PEG-J tube displacement, recurrent  #. Abdominal pain   #. History of TPAIT (2012)  Presents with abdominal pain with evidence of coiled jejunal extension. Exchanged for same indication on 4/17. Since then time she has had increased abdominal bloating and worsening leakage around the tube. Has been vomiting as well. Previously discussed separate PEG and PEJ tubes with recurrent coiling (which unfortunately happens frequently in TPIAT anatomy).     Now s/p uncomplicated separate PEG/PEJ placement with Dr. Perdomo on 5/2/25. Noted to have overt evidence of gastroparesis with solid contents seen throughout the stomach.     Patient w/ some abdominal pain around PEJ site, improved slightly with a BM. Patient currently tolerating tube feeds @ goal. PEJ site without any significant amount of erythema or drainage. Tenderness as expected post procedurally. She does have some skin breakdown around PEG w/ adherent guaze. Recommend wound care consult and follow up outpatient.     RECOMMENDATIONS:  - No anticoagulation for 72 hours post operatively.  - DO NOT administer crushed meds through the J tube. All medications should ideally be given in liquid formulation via J tube (to ensure absorption and avoid clogging of the Jtube).  If medications not available in suspension form, alternatively crushed meds can given through the G tube or capsules via PO.  - Order to flush PEG and Jtube ports EACH with 30 ml q 8 hrs to maintain patency.  - Order to flush feeding tube with minimum of 30 ml H2O before and after  medications.  - Additional Free Water Flushes (hydration) per RD  - T-tags (white buttons) should be removed 2 weeks following procedure (~5/16) by lifting the button and cutting the underlying blue suture. Often times the buttons fall off prior to the 2 week wilfrido.  - Analgesia/antiemetics per primary team (expect pain to peak POD2-3)  - PEG/PEJ site care and TF educaton per bedside RN.  - PERT therapy per nutrition   - Diabetes management per endocrinology  - Recommend would care consult prior to discharge and follow up outpatient  - Agree with scheduled Miralax and senna for constipation  - Continue PTA famotidine, Protonix, linaclotide, reglan, and sucralfate    Discussed with primary team Dr. Shabbir- intern & Dr. Mello- resident.     GI will sign off at this point. Please reach out with any questions or concerns.     The patient was discussed and plan agreed upon with GI staff, Dr Jacome.    GI Follow up (we will arrange - message to be sent at discharge):  -Sent message to [facility] about T tag removal & outpatient wound care consult on 5/5    Overall time spent on the date of this encounter preparing to see the patient (including chart review of available notes, clinical status events, imaging and labs); obtaining and/or reviewing separately obtained history; examining the patient , coordinating and/or ordering medications, tests and/or procedures; communicating with other health care professionals; and documenting the above clinical information in the electronic medical record was 60 minutes.      Yudy Byrnes PA-C  Advanced Endoscopy/Pancreaticobiliary GI Service  [facility]  Vocera   ______________________________________________________      Interval events since last evaluated: Nursing notes and 24hr events reviewed. NAEON, vitals stable.     Patient seen and examined at bedside this AM. Patient reports pain around PEJ site, has been receiving acetaminophen and oxycodone  with relief. Had 4 BM's yesterday, some improvement in pain following this. Tolerating regular diet. No nausea, vomiting, fevers, or chills. Tolerating tube feeds.     ROS:   4 pt ROS negative unless noted in subjective.     Objective:  Blood pressure 120/73, pulse 86, temperature 97.8  F (36.6  C), temperature source Oral, resp. rate 18, weight 46.2 kg (101 lb 12.8 oz), SpO2 99%, not currently breastfeeding.  Gen: Sitting in bed. Appears comfortable  HEENT: NCAT. Conjunctiva clear. Sclera anicteric   CV: RRR, Peripheral perfusion intact  Resp: No increased work of breathing  Abd: Soft, mild tenderness to palpation around PEJ site, mild distention. No rebound or guarding. PEG site visualized after removal of adherent guaze, small amount of drainage & skin breakdown around site. PEJ site visualized without any significant drainage or erythema, external bumper at 2cm.   Msk: no gross deformity  Skin: No jaundice  Ext: warm, well perfused   Neuro: grossly normal  Mental status/Psych: A&O. Asks/answers questions appropriately       PROCEDURES:  SMALL BOWEL ENTEROSCOPY (05/02/2025 8:36 AM)     LABS:  [facility]  Recent Labs   Lab 05/05/25  0900 05/05/25  0722 05/05/25  0211 05/04/25  2149 05/04/25  0806 05/04/25  0718 05/03/25  0846 05/03/25  0754 05/02/25  0732 05/02/25  0646   NA  --  138  --   --   --  138  --  137  --  133*   POTASSIUM  --  4.1  --   --   --  3.9  --  4.2  --  4.5   CHLORIDE  --  101  --   --   --  103  --  102  --  100   ELIZA  --  8.7*  --   --   --  8.5*  --  8.4*  --  8.2*   CO2  --  26  --   --   --  24  --  25  --  25   BUN  --  16.8  --   --   --  12.5  --  9.4  --  7.4*   CR  --  0.37*  --   --   --  0.35*  --  0.37*  --  0.40*   * 143* 200* 147*   < > 114*   < > 166*   < > 358*    < > = values in this interval not displayed.     CBC  Recent Labs   Lab 05/05/25  0722 05/04/25  0718 05/03/25  0754 05/02/25  0646   WBC 11.0 15.3* 10.2 6.3   RBC 3.18* 3.35* 3.43* 3.75*   HGB 10.2* 10.7* 11.1* 11.7    HCT 30.8* 31.7* 32.3* 35.0   MCV 97 95 94 93   MCH 32.1 31.9 32.4 31.2   MCHC 33.1 33.8 34.4 33.4   RDW 13.9 13.9 13.8 13.6    408 474* 550*     INR  Recent Labs   Lab 05/01/25  1007   INR 0.92     LFTs  Recent Labs   Lab 05/05/25  0722 05/04/25  0718 05/03/25  0754 05/02/25  0646   ALKPHOS 126 117 108 121   AST 26 47* 30 46*   ALT 37 47 47 68*   BILITOTAL 0.2 0.2 0.2 0.2   PROTTOTAL 5.8* 5.8* 5.6* 5.7*   ALBUMIN 3.3* 3.2* 3.1* 3.3*      PANC  Recent Labs   Lab 04/30/25  0530   LIPASE 7*         IMAGING:  (Personally reviewed)  ------------------------------------------------------------------  CT Abdomen Pelvis w Contrast (04/30/2025 8:53 AM)   ------------------------------------------------------------------

## 2025-05-05 NOTE — PROGRESS NOTES
[facility]  Patient is currently open to home care services with [facility]. The patient has   RN PT  OT  [facility]   and team have been notified of patient admission. The end of this  episode is 6/25/25.   [facility] liaison will continue to follow patient during stay. If  appropriate provide orders to resume home care at time of discharge.

## 2025-05-05 NOTE — PLAN OF CARE
Goal Outcome Evaluation:      Plan of Care Reviewed With: patient    Overall Patient Progress: no changeOverall Patient Progress: no change    [id number]  Blood pressure 122/77, pulse 104, temperature 99  F (37.2  C), temperature source Oral, resp. rate 16, weight 46.2 kg (101 lb 12.8 oz), SpO2 100%, not currently breastfeeding.  Status: POD #2 s/p S/p EGD, Jejunopexy, J tube placement, G tube exchange   Neuros: A&O x4, neuros intact.   Cardiac: WNL  Respiratory: WNL on RA    GI/: Voiding via bed pan. LBM 5/4 (x2)   Diet/Nausea: Regular diet, no nausea reported during the day    Skin: G tube, J tube, R PICC (2)     LDA: G tube open to gravity, clamped w/ meds. J tube w/ continuous TFs @ 45 mL/hr + Relisorb. R PICC (2) w/ purple lumen infusing TKO  Labs: BG transitioned to ACHS + 0200. Sliding scale correction insulin for BG >150 in MAR. Carb coverage 1:14   Pain: Abdominal/Chronic back pain managed w/ PRN oxy + Tylenol   Activity: Up w/ assist x1  Plan: Continue POC. BG control

## 2025-05-05 NOTE — DISCHARGE INSTRUCTIONS
Diabetes Management Discharge Plan  Blood glucose monitoring: Restart CGM or check finger stick before meals and every 4-6 hours during eveining/overnight when getting tube feeds.       Glucose Control Regimen:  1) Long-acting insulin: glargine (Lantus) - take 4 units once daily at 6pm. Take at same time each day.     2) Rapid-acting insulin: aspart (Novolog) carbohydrate coverage/mealtime insulin - take 1 unit per 14 grams of carbohydrates before meals and snacks.     3) Rapid-acting insulin: aspart (Novolog) correction - see chart below. Take for high blood glucoses three times daily before meals when eating (or every 4-6 hours when receiving tube feeding) and at bedtime.  You may add the correction dose to the carbohydrate coverage/mealtime insulin dose and give in one injection--ideally 10-15 minutes before a meal.  You may take the correction dose even if you skip a meal (as long as it has been 4 hours since previous correction dose).      Blood Glucose Insulin Before Meals When Eating or every 4-6 hours when receiving tube feeding:   Less than 175 0 units   175 -224  1 units   225 - 274 2 units   275 - 324 3 units   325 - 374 4 units    375 - 424  5 units   425 - 474 6 units   475 or more 7 units         4) Intermediate-acting insulin: Novolin N (NPH). Take to cover tube feeds (dosed for Sagrario Marinelli at 45 ml/hr)   - Take Novolin N (NPH) 4 units at 9AM   - Take Novolin N (NPH) 3 units at 9PM      (If your rate of tube feed were to decrease, you can reference the list below)  If TF rate at 20 ml per hour, give 1 units  If TF rate at 30 ml per hour, give 3 units  If TF rate from 40-45 ml/hr, give 5 units     Humulin N needs to be gently rolled/mixed before injecting.  Do not give the dose if the tube feeds will not run.    Hypoglycemia (Low Blood Glucose) Management:  Signs/symptoms:  Shaking, sweating, fast heartbeat  Feeling dizzy, tired, or weak   Feeling anxious and easy to irritate  Feeling nervous, crabby,  or confused  Hunger  Vision problems, headache  Numb or tingling mouth    If blood glucose is 51 to 70:   Eat or drink 15 grams of carbohydrate. Examples:   1/2 cup (4 ounces) apple juice or regular soda pop, or   1 cup (8 ounces) milk, or   15 skittles, or   3 to 4 glucose tablets.   Re-check your blood glucose in 15 minutes.   Repeat these steps every 15 minutes until your blood glucose is above 80.       If blood glucose is 50 or less:   Eat or drink 30 grams of carbohydrate. Examples:   1 cup (8 ounces) apple juice or regular soda pop, or   2 cups (16 ounces) milk, or   1 banana, or   6 to 8 glucose tablets.   Re-check your blood glucose in 15 minutes.   Repeat these steps every 15 minutes until your blood glucose is above 80.     Outpatient Follow-up: recommend [facility]; next scheduled 5/13/25 with Libby Jay RN and 8/21/25 with Dr. Maher

## 2025-05-06 ENCOUNTER — MEDICAL CORRESPONDENCE (OUTPATIENT)
Dept: HEALTH INFORMATION MANAGEMENT | Facility: CLINIC | Age: 60
End: 2025-05-06
Payer: MEDICARE

## 2025-05-06 ENCOUNTER — HOME INFUSION (OUTPATIENT)
Dept: HOME HEALTH SERVICES | Facility: HOME HEALTH | Age: 60
End: 2025-05-06
Payer: MEDICARE

## 2025-05-06 ENCOUNTER — HOME INFUSION BILLING (OUTPATIENT)
Dept: HOME HEALTH SERVICES | Facility: HOME HEALTH | Age: 60
End: 2025-05-06
Payer: MEDICARE

## 2025-05-06 VITALS
TEMPERATURE: 98.5 F | OXYGEN SATURATION: 100 % | SYSTOLIC BLOOD PRESSURE: 109 MMHG | RESPIRATION RATE: 18 BRPM | DIASTOLIC BLOOD PRESSURE: 60 MMHG | WEIGHT: 102.7 LBS | BODY MASS INDEX: 18.78 KG/M2 | HEART RATE: 87 BPM

## 2025-05-06 DIAGNOSIS — Z53.9 DIAGNOSIS NOT YET DEFINED: Primary | ICD-10-CM

## 2025-05-06 LAB
ALBUMIN SERPL BCG-MCNC: 3.3 G/DL (ref 3.5–5.2)
ALP SERPL-CCNC: 119 U/L (ref 40–150)
ALT SERPL W P-5'-P-CCNC: 32 U/L (ref 0–50)
ANION GAP SERPL CALCULATED.3IONS-SCNC: 10 MMOL/L (ref 7–15)
AST SERPL W P-5'-P-CCNC: 26 U/L (ref 0–45)
BILIRUB SERPL-MCNC: <0.2 MG/DL
BUN SERPL-MCNC: 20.1 MG/DL (ref 8–23)
CALCIUM SERPL-MCNC: 8.6 MG/DL (ref 8.8–10.4)
CHLORIDE SERPL-SCNC: 102 MMOL/L (ref 98–107)
CREAT SERPL-MCNC: 0.37 MG/DL (ref 0.51–0.95)
EGFRCR SERPLBLD CKD-EPI 2021: >90 ML/MIN/1.73M2
ERYTHROCYTE [DISTWIDTH] IN BLOOD BY AUTOMATED COUNT: 13.9 % (ref 10–15)
GLUCOSE BLDC GLUCOMTR-MCNC: 100 MG/DL (ref 70–99)
GLUCOSE BLDC GLUCOMTR-MCNC: 108 MG/DL (ref 70–99)
GLUCOSE BLDC GLUCOMTR-MCNC: 141 MG/DL (ref 70–99)
GLUCOSE BLDC GLUCOMTR-MCNC: 169 MG/DL (ref 70–99)
GLUCOSE SERPL-MCNC: 168 MG/DL (ref 70–99)
HCO3 SERPL-SCNC: 26 MMOL/L (ref 22–29)
HCT VFR BLD AUTO: 31 % (ref 35–47)
HGB BLD-MCNC: 10.2 G/DL (ref 11.7–15.7)
MCH RBC QN AUTO: 31.5 PG (ref 26.5–33)
MCHC RBC AUTO-ENTMCNC: 32.9 G/DL (ref 31.5–36.5)
MCV RBC AUTO: 96 FL (ref 78–100)
PLATELET # BLD AUTO: 387 10E3/UL (ref 150–450)
POTASSIUM SERPL-SCNC: 4.4 MMOL/L (ref 3.4–5.3)
PROT SERPL-MCNC: 5.9 G/DL (ref 6.4–8.3)
RBC # BLD AUTO: 3.24 10E6/UL (ref 3.8–5.2)
SODIUM SERPL-SCNC: 138 MMOL/L (ref 135–145)
WBC # BLD AUTO: 8.2 10E3/UL (ref 4–11)

## 2025-05-06 PROCEDURE — 36592 COLLECT BLOOD FROM PICC: CPT

## 2025-05-06 PROCEDURE — 82435 ASSAY OF BLOOD CHLORIDE: CPT

## 2025-05-06 PROCEDURE — 250N000013 HC RX MED GY IP 250 OP 250 PS 637: Performed by: STUDENT IN AN ORGANIZED HEALTH CARE EDUCATION/TRAINING PROGRAM

## 2025-05-06 PROCEDURE — 85014 HEMATOCRIT: CPT

## 2025-05-06 PROCEDURE — 99238 HOSP IP/OBS DSCHRG MGMT 30/<: CPT | Mod: GC | Performed by: STUDENT IN AN ORGANIZED HEALTH CARE EDUCATION/TRAINING PROGRAM

## 2025-05-06 PROCEDURE — 250N000013 HC RX MED GY IP 250 OP 250 PS 637

## 2025-05-06 PROCEDURE — S9342 HIT ENTERAL PUMP DIEM: HCPCS

## 2025-05-06 RX ORDER — FUROSEMIDE 40 MG/1
40 TABLET ORAL DAILY
Qty: 30 TABLET | Refills: 0 | Status: ON HOLD | OUTPATIENT
Start: 2025-05-07

## 2025-05-06 RX ORDER — LANOLIN ALCOHOL/MO/W.PET/CERES
100 CREAM (GRAM) TOPICAL DAILY
Qty: 30 TABLET | Refills: 0 | Status: ON HOLD | OUTPATIENT
Start: 2025-05-07

## 2025-05-06 RX ORDER — OXYCODONE HYDROCHLORIDE 5 MG/1
5-10 TABLET ORAL EVERY 6 HOURS PRN
Qty: 16 TABLET | Refills: 0 | Status: SHIPPED | OUTPATIENT
Start: 2025-05-06 | End: 2025-05-13

## 2025-05-06 RX ORDER — CYCLOBENZAPRINE HCL 5 MG
TABLET ORAL
Qty: 135 TABLET | Refills: 2 | Status: ON HOLD | OUTPATIENT
Start: 2025-05-06

## 2025-05-06 RX ORDER — GABAPENTIN 400 MG/1
400 CAPSULE ORAL 3 TIMES DAILY
Qty: 90 CAPSULE | Refills: 2 | Status: ON HOLD | OUTPATIENT
Start: 2025-05-06

## 2025-05-06 RX ORDER — CYCLOBENZAPRINE HCL 5 MG
5 TABLET ORAL
Status: DISCONTINUED | OUTPATIENT
Start: 2025-05-06 | End: 2025-05-06 | Stop reason: HOSPADM

## 2025-05-06 RX ORDER — GABAPENTIN 400 MG/1
400 CAPSULE ORAL 3 TIMES DAILY
Status: DISCONTINUED | OUTPATIENT
Start: 2025-05-06 | End: 2025-05-06 | Stop reason: HOSPADM

## 2025-05-06 RX ORDER — NALOXONE HYDROCHLORIDE 0.4 MG/ML
0.2 INJECTION, SOLUTION INTRAMUSCULAR; INTRAVENOUS; SUBCUTANEOUS ONCE
Qty: 0.5 ML | Refills: 0 | Status: SHIPPED | OUTPATIENT
Start: 2025-05-06 | End: 2025-05-06

## 2025-05-06 RX ADMIN — DULOXETINE 60 MG: 60 CAPSULE, DELAYED RELEASE ORAL at 08:15

## 2025-05-06 RX ADMIN — FUROSEMIDE 40 MG: 40 TABLET ORAL at 08:24

## 2025-05-06 RX ADMIN — THIAMINE HCL TAB 100 MG 100 MG: 100 TAB at 08:21

## 2025-05-06 RX ADMIN — METOCLOPRAMIDE 10 MG: 10 TABLET ORAL at 08:15

## 2025-05-06 RX ADMIN — SENNOSIDES AND DOCUSATE SODIUM 1 TABLET: 50; 8.6 TABLET ORAL at 08:18

## 2025-05-06 RX ADMIN — HYDROCORTISONE 10 MG: 10 TABLET ORAL at 08:20

## 2025-05-06 RX ADMIN — LINACLOTIDE 145 MCG: 145 CAPSULE, GELATIN COATED ORAL at 08:13

## 2025-05-06 RX ADMIN — INSULIN HUMAN 4 UNITS: 100 INJECTION, SUSPENSION SUBCUTANEOUS at 10:44

## 2025-05-06 RX ADMIN — PANCRELIPASE 5 CAPSULE: 60000; 12000; 38000 CAPSULE, DELAYED RELEASE PELLETS ORAL at 08:13

## 2025-05-06 RX ADMIN — LEVOTHYROXINE SODIUM 125 MCG: 125 TABLET ORAL at 08:25

## 2025-05-06 RX ADMIN — GABAPENTIN 300 MG: 300 CAPSULE ORAL at 08:21

## 2025-05-06 RX ADMIN — TOPIRAMATE 100 MG: 100 TABLET, FILM COATED ORAL at 08:19

## 2025-05-06 RX ADMIN — POLYETHYLENE GLYCOL 3350 17 G: 17 POWDER, FOR SOLUTION ORAL at 08:17

## 2025-05-06 RX ADMIN — INSULIN ASPART 10 UNITS: 100 INJECTION, SOLUTION INTRAVENOUS; SUBCUTANEOUS at 10:37

## 2025-05-06 RX ADMIN — Medication 15 ML: at 08:23

## 2025-05-06 RX ADMIN — SUCRALFATE 1 G: 1 TABLET ORAL at 12:17

## 2025-05-06 RX ADMIN — SUCRALFATE 1 G: 1 TABLET ORAL at 08:20

## 2025-05-06 RX ADMIN — POTASSIUM CHLORIDE 20 MEQ: 750 TABLET, EXTENDED RELEASE ORAL at 08:19

## 2025-05-06 RX ADMIN — ACETAMINOPHEN 975 MG: 325 TABLET ORAL at 06:49

## 2025-05-06 RX ADMIN — DULOXETINE HYDROCHLORIDE 30 MG: 30 CAPSULE, DELAYED RELEASE ORAL at 08:24

## 2025-05-06 RX ADMIN — PANTOPRAZOLE SODIUM 40 MG: 40 TABLET, DELAYED RELEASE ORAL at 08:18

## 2025-05-06 RX ADMIN — DRONABINOL 5 MG: 5 CAPSULE ORAL at 08:19

## 2025-05-06 RX ADMIN — OXYCODONE HYDROCHLORIDE 5 MG: 5 TABLET ORAL at 07:02

## 2025-05-06 ASSESSMENT — ACTIVITIES OF DAILY LIVING (ADL)
ADLS_ACUITY_SCORE: 53

## 2025-05-06 NOTE — PROGRESS NOTES
Care Management Discharge Note    Discharge Date: 05/06/2025   Discharge Disposition: Home, Home Care,   Discharge Services: Home Care, Home Infusion  [facility]  [facility] - RN/PT/OT resumed, ordered  Ph:[phone]  Fx:[phone]     [facility] - TF resumed, ordered  Phone  [phone]  Fax  [phone]     [facility] Referral Hub      Discharge DME: None  Discharge Transportation:   Private pay costs discussed: Not applicable  Education Provided on the Discharge Plan: Yes  Persons Notified of Discharge Plans: ACGUNNER, [facility], patient  Patient/Family in Agreement with the Plan: yes  Handoff Referral Completed: Yes     Additional Information:  Per primary team patient is ready to discharge home today     DANIELLE Hanley liaison updated  Savi GALVEZ liaison updated.     10:42 AM  I wants to know if relizorb was discontinued - it is not in orders. Per dietician patient needs to continue relizorb, primary team will order.     [facility] asking if pt is leaving with PICC? PICC was placed this admission. Per patient, her PCP told her to keep the PICC so she can get labs and AB. Sent vocera to bedside RN and primary team to sort out situation.     12:01 PM  Per bedside RN, team saw patient and PICC line will be removed. DWAYNEI and [facility]V updated.        Lu Squires, RN, [facility]  Inpatient Care Management - FLOAT

## 2025-05-06 NOTE — PLAN OF CARE
Goal Outcome Evaluation:      Plan of Care Reviewed With: patient    Overall Patient Progress: improvingOverall Patient Progress: improving    Vitals: Blood pressure 117/70, pulse 96, temperature 98.2  F (36.8  C), temperature source Oral, resp. rate 16, weight 48 kg (105 lb 14.4 oz), SpO2 98%, not currently breastfeeding.    Pt is A/O x 4, calls appropriately and makes needs known. Up with assist x1, walker, and gait belt, O2 > 94% on RA. Pain managed with oxycodone and tylenol. Tolerating regular diet and denies nausea at time. Continuous TF running @ 45 ml/hr. No BM this shift, LBM 05/05/25, + BS and + Flatus. Voiding without difficulties. Skin is WDL. No significant changes this shift, continue POC.          Pt was in for appointment today 7/30/19. She states she forgot to tell Dr Yoandy Case about leg cramps she has at night. She said it happens 2 or 3 nights a week in her calf muscles. Please advise.

## 2025-05-06 NOTE — PROGRESS NOTES
Patient's PICC line removed. Personal belongings packed. Virtual nurse completed discharge and patient escorted to discharge pharmacy.

## 2025-05-06 NOTE — PROGRESS NOTES
DISCHARGE DATE: 05/06/25 ([facility])  THERAPY: enteral only  DRUG/ DELIVERY MODE: Sagrario ModaMi Peptide 1.5  FIRST HOME DOSE DUE: continuous feeding   DELIVERY: Delivery to pt's home via [employer]  SUPPLIES: 2x2 gauze, and tape  LINE/TUBE: PEGJ tube  SNV: NA  I FOLLOWING PROVIDER: Same provider as prior to this admission.  90 DAY LAURE NOTE: NA  OTHER: Spoke with pt and went over enteral discharge orders and water flush.  Pt understood well.  Asked if pt needed any supplies at home and she stated she only needed 2x2 gauze. Explained [facility] will send to her home via [employer] and she agreed to this.  Asked her if she had any questions or concerns and she stated she did not. Encouraged her to contact [facility] with any questions or concerns.    Thank you,    Savi Marks LPN  Enteral Nurse Liaison  [facility]  [address] [place] [address]

## 2025-05-06 NOTE — PROGRESS NOTES
[facility]  Start of Care/Resumption of Care Note    [facility] RAMAN    DX: Exocrine pancreatic insufficiency     Therapy: Enteral    Next Dose Due: Upon return home    Delivery plan: Needing delivery of 2X2 Gauze    Nursing plan: Enteral Only.     Other Info: Resumption Enteral Patient. Patient has home care RN/PT/OT services through [facility]/ [place].     Meaghan Flores RN 05/06/25

## 2025-05-07 ENCOUNTER — TELEPHONE (OUTPATIENT)
Dept: INTERNAL MEDICINE | Facility: CLINIC | Age: 60
End: 2025-05-07
Payer: MEDICARE

## 2025-05-07 DIAGNOSIS — Z09 HOSPITAL DISCHARGE FOLLOW-UP: ICD-10-CM

## 2025-05-07 LAB — CANDIDA AURIS CONFIRMATION PCR: NEGATIVE

## 2025-05-07 PROCEDURE — S9342 HIT ENTERAL PUMP DIEM: HCPCS

## 2025-05-07 NOTE — DISCHARGE SUMMARY
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      Follow-ups Needed After Discharge   - Follow-up with PCP on pain management   - Follow-up with [facility] nurse outpatient  - Follow-up with pain clinic as scheduled  - Follow-up with endocrinology as scheduled  - Follow-up with gastroenterology as scheduled    Unresulted Labs Ordered in the Past 30 Days of this Admission       Date and Time Order Name Status Description    5/5/2025  8:48 AM Candida auris by PCR In process         These results will be followed up by PCP.     Discharge Disposition   Discharged to home  Condition at discharge: Stable    Hospital Course   Chantell Kidd was admitted on 4/30/2025 who was admitted due to abdominal pain, LE edema and found to have hyperglycemia of >700 .  The following problems were addressed during her hospitalization:    # Abdominal pain   # PEG tube displacement  # Bloating  # Malnutrition  # Cachexia  Patient recently had PEG-J repositioned by GI on 4/17 after G tube became dislodged with an audible pop. She now has leakage of fluid which she took PO around the site of PEG tube, along with surrounding erythema and abdominal pain. She endorsed severe 10/10 lower abdominal pain and back. Lipase <7 with AST/ ALT/ alkaline phosphatase downtrending from prior admission. CTAP 4/30 showed no acute  intrabdominal pathology with PEG-J coiled in the stomach, along with large colonic stool burden. Has been having recurrent admissions for abdominal pain with pain team consulted during prior admissions. Pain could be 2/2 PEG-J displacement vs constipation vs steatorrhea 2/2 malabsorption. PEG-J replaced by GI on 05/02. WOC consulted for skin breakdown around PEG-J.   - Creon for TF coverage   - Thiamine 100mg   - Pain plan:              - acetaminophen 975 mg PO q8h              - flexeril 5-7.5 mg PO TID PRN              - oxycodone 5-10 mg PO q4h PRN               - gabapentin 300 mg TID     - Follow-up in pain clinic in June as planned  - Constipation:              -Scheduled miralax and scheduled senna  - Continue PTA famotidine, protonix, linaclotide, reglan and sucralfate     #Pneumobilia  Patient found to have persistent small amount of pneumobilia on abdominal Xray post PEG-J replacement, similar to prior presentation. With patient being afebrile, without leukocytosis without any localized RUQ pain, lower concern for cholecystitis. Low concern for perforation as a potential cause with pt not having rebound tenderness or rigidity. Pneumobilia could be 2/2 new PEG-J replacement.     # Post-pancreatectomy diabetes (Type 1)  # TPAIT (2012)  Patient presented with BG of 747 with HbA1c of 15.8. Did not have an angion gap. She was previously admitted with BG of 900 with HbA1c of 19.1 (4/15) and was seen by endocrinology inpatient. During that admission there was concern for patient not taking her dose of insulin at that HbA1c. She was discharged on Lantus 3U, carb correction of 1U per 14g, sliding scale insulin as well as NPH 4U for TF which she reported taking, althought frequently had blood glucose above 500 upon checks at home. She was previously seen outpatient by both endocrinology and PCP. Started on insulin gtt and endocrinology was consulted. BG were better controlled and patient successfully transitioned  subQ insulin regimen.   - Diabetes regimen:   - NPH 4 units q 24 hours at 1000 to cover TF. Hold if TF on hold.  - NPH 3 units q 24 hours at 2200 to cover TF. Hold if TF on hold.   - Lantus 4 units q 24 hrs at 1800.  - Change Novolog Meal Coverage: 1 unit per 14 grams CHO, TID AC and 1:15 PRN with snacks/supplements  - Novolog Correction Scale: 1:150 q4 hours --> TID AC, HS, 0200 at 1400  -  Creon 12,000-38,000 -60,000 unit delayed-release capsule -Take 8 capsules (96,000 units of lipase total) by mouth 3 (three) times per day with meals.      #Lower extremity edema, improved  Patient presented with bilateral tender LE edema which is present at baseline but has worsened. No prior history of heart failure. No trauma, no erythema or warmth, however, is tender to palpation. LE US negative for DVT. Prior TTE in our system is from 2016, although patient looks euvolemic on physical exam. Cr normal without proteinuria on UA, reassuring against nephrotic syndrome. TTE WNL.   -Continue PTA lasix 40mg (prescribed by provider in [place])     # History of adrenal insufficiency  Followed by Dr. Maher. Previously suppressed AM cortisol and has been on empiric therapy for possible adrenal insufficiency, unclear if central vs transient. Most recent clinic note describes inability to wean steroids due to hypoglycemia episodes.   - Continue PTA hydrocortisone 10mg in the AM and 15mg in the PM     #Hypothyroidism  -Continue PTA levothyroxine     #MDD  -Continue PTA duloxetine    Consultations This Hospital Stay   NURSING TO CONSULT FOR VASCULAR ACCESS CARE IP CONSULT  NURSING TO CONSULT FOR VASCULAR ACCESS CARE IP CONSULT  VASCULAR ACCESS FOR PICC PLACEMENT ADULT IP CONSULT  CARE MANAGEMENT / SOCIAL WORK IP CONSULT  ENDOCRINE DIABETES ADULT IP CONSULT  GI LUMINAL ADULT IP CONSULT  LYMPHEDEMA THERAPY IP CONSULT  NUTRITION SERVICES ADULT IP CONSULT  PHARMACY IP CONSULT  PHARMACY IP CONSULT  CARE MANAGEMENT / SOCIAL WORK IP  CONSULT  PHARMACY IP CONSULT  WOUND OSTOMY CONTINENCE NURSE  IP CONSULT  NURSING TO CONSULT FOR VIRTUAL CARE IP    Code Status   Prior       The patient was discussed with Dr. Nancy Wilkins MD  [facility] [address] [address]  Phone: [phone]  ______________________________________________________________________    Physical Exam   Vital Signs: Temp: 98.5  F (36.9  C) Temp src: Oral BP: 109/60 Pulse: 87   Resp: 18 SpO2: 100 % O2 Device: None (Room air)    Weight: 102 lbs 11.2 oz  General Appearance:  Alert. Comfortable appearing. Responds to questions appropriately. Appears older than stated age.  GI: soft, nondistended, endorses tenderness to palpation in lower abdominal quadrants but no rigidity, rebound tenderness, or guarding  Skin: Erythema around PEJ-G  Other:  Moving extremities spontaneously      Primary Care Physician   Ron Morgan    Discharge Orders      Primary Care - Care Coordination Referral      Home Infusion Referral      Wound Care Referral      Resume Home Care Services    [facility]  Ph:[phone]  Fx:[phone]    Please resume RN/PT/OT     Activity    Your activity upon discharge: activity as tolerated     Tubes and Drains    Current Tubes and Drains:     PICC Line  Duration           PICC 04/30/25 Double Lumen Right Brachial vein medial 5 days        Drain  Duration           GI Enteral Access Device LLQ Jejunostomy 14 fr 3 days    GI Enteral Access Device LUQ Gastrostomy 18 fr 3 days              NO Follow-up Care Needed    NO follow-up care needed for this patient.     Reason for your hospital stay    High blood sugar     Diet    Follow this diet upon discharge: Current Diet:Orders Placed This Encounter      Adult Formula Drip Feeding: Continuous [place] Peptide 1.5; Jejunostomy; Goal Rate: 45; mL/hr; see relizorb cartridge order      Regular Diet Adult       Significant  "Results and Procedures   Results for orders placed or performed during the hospital encounter of 04/30/25   US Lower Extremity Venous Duplex Bilateral    Narrative    Exam: Ultrasound of the deep venous system of bilateral lower  extremities dated 4/30/2025 8:02 AM    Clinical information: Bilateral lower extremity pain    Comparison: CT abdomen/pelvis 1/28/2025    Technique: Gray-scale evaluation with compression and Doppler  assessment of deep venous system for spontaneous and phasic flow, as  well as the presence of distal augmentation. Color flow images  obtained as needed. Gray-scale images with compression of the great  saphenous vein obtained as needed.    Findings:    Right leg:    CFV: Thrombus: No, Phasic: Yes  Femoral vein, proximal: Thrombus: No, Phasic: Yes  Femoral vein, mid: Thrombus: No, Phasic: Yes  Femoral vein, distal: Thrombus: No, Phasic: Yes  Popliteal vein: Thrombus: No, Phasic: Yes  PTV: Thrombus: No  Peroneal vein: Thrombus: No    Left leg:    CFV: Thrombus: No, Phasic: Yes  Femoral vein, proximal: Thrombus: No, Phasic: Yes  Femoral vein, mid: Thrombus: No, Phasic: Yes  Femoral vein, distal: Thrombus: No, Phasic: Yes  Popliteal vein: Thrombus: No, Phasic: Yes  PTV: Thrombus: Thrombus: No  Peroneal vein: Thrombus: No    Soft tissue edema is present in the lower extremities bilaterally.      Impression    Impression:    1. No DVT in either lower extremity  2. Bilateral soft tissue edema in the legs.      Reference: \"Duplex Ultrasound in the Diagnosis of Lower-Extremity Deep  Venous Thrombosis\"- Tonie Watkins MD, S; Jama Sahu MD  (Circulation. 2014;129:917-921. http://circ.ahajournals.org )    RAUL SALGADO MD         SYSTEM ID:  [id number]   CT Abdomen Pelvis w Contrast    Narrative    EXAMINATION: CT ABDOMEN PELVIS W CONTRAST, 4/30/2025 8:53 AM    INDICATION: right sided abdominal pain    COMPARISON STUDY: CT 4/16/2025    TECHNIQUE: CT scan of the abdomen and pelvis was performed " on  multidetector CT scanner using volumetric acquisition technique and  images were reconstructed in multiple planes with variable thickness  and reviewed on dedicated workstations.     CONTRAST: 64cc of [id number] injected IV without oral contrast    CT scan radiation dose is optimized to minimum requisite dose using  automated dose modulation techniques.    FINDINGS:    Lower thorax: Bibasilar dependent subsegmental atelectasis.    Liver: No mass.     Biliary System: The gallbladder is surgically absent. No extrahepatic  biliary ductal dilation. Pneumobilia, similar to prior.    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    Adrenal glands: No mass or nodules    Spleen: Surgically absent.    Kidneys: No suspicious mass, obstructing calculus or hydronephrosis.    Gastrointestinal tract: Postoperative changes of Venessa-en-Y gastric  bypass. Percutaneous gastrostomy tube coils in the stomach. No bowel  obstruction. Moderate to large colonic stool burden. Redundant colon  as discussed previously. Appendix is not clearly identified.  Fecalization in distal small bowel loops likely represents some  stasis.    Mesentery/peritoneum/retroperitoneum: No free air. Small volume free  fluid, similar to prior.    Lymph nodes: No significant lymphadenopathy.    Vasculature: Patent major abdominal vasculature allowing for late  arterial/early venous phase of scanning.    Pelvis: Urinary bladder is well distended and unremarkable.  Status  post hysterectomy.    Osseous structures: No aggressive or acute osseous lesion.  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      Soft tissues: Diffuse soft tissue anasarca, similar to prior.      Impression    IMPRESSION:   1. No acute intra-abdominal pathology suspected. Appendix is not  clearly identified however no definite localizing findings in the  right lower quadrant are appreciated.  2. Stable postoperative changes of Venessa-en-Y gastric  bypass.  Percutaneous gastrostomy tube has been replaced with tube coiling in  the stomach.  3. Moderate to large colonic stool burden. No evidence of bowel  obstruction.   4. Additional chronic and postsurgical changes as described in the  body of the report are not significantly changed from prior.    I have personally reviewed the examination and initial interpretation  and I agree with the findings.    JANETTE MIGUEL MD         SYSTEM ID:  [id number]   XR Surgery NITHIN Fluoro Less Than 5 Min w Stills    Narrative    This exam was marked as non-reportable because it will not be read by a   radiologist or a [place] non-radiologist provider.         XR Abdomen Port 1 View    Narrative    EXAMINATION:  XR ABDOMEN PORT 1 VIEW 5/2/2025 7:00 PM.    COMPARISON: CT 4/30/2025, radiograph 4/22/2025.    HISTORY:  s/p PEGJ replacement, pain around tube - assess placement    FINDINGS:   Air distended stomach. Percutaneous GJ tube balloon projects over the  lower aspect of the stomach, similar compared to recent imaging. Large  colonic stool burden. Small amount of pneumobilia, unchanged compared  to recent CT. Suture material present in the right lower quadrant at a  bowel anastomosis. Cholecystectomy clips. No focal consolidation in  the included lung bases. No acute osseous abnormality.      Impression    IMPRESSION:   1. Percutaneous GJ tube balloon projects over the lower aspect of the  stomach, similar compared to recent imaging.  2. Persistent small amount of pneumobilia, similar compared to recent  CT.    I have personally reviewed the examination and initial interpretation  and I agree with the findings.    PAULA ABDALLA MD         SYSTEM ID:  [id number]   Echo Complete     Value    LVEF  60-65%    Narrative    [id number]  [id number]  [id number]  [id number]^FELIPE     [facility],[place]  Echocardiography Laboratory  [address] [place] [id number]     Name: MONET ZHANG  MRN:  [id number]  : 1965  Study Date: 2025 01:39 PM  Age: 59 yrs  Gender: Female  Patient Location: [facility]  Reason For Study: Edema  Ordering Physician: SHABBIR, AISHA  Performed By: Maria Esther Grullon     BSA: 1.4 m2  Height: 62 in  Weight: 104 lb  BP: 137/83 mmHg  ______________________________________________________________________________  Procedure  Echocardiogram with two-dimensional, color and spectral Doppler.  ______________________________________________________________________________  Interpretation Summary  Left ventricular size is normal. Global and regional left ventricular function  is normal with an EF of 60-65%.  Right ventricular function, chamber size, wall motion, and thickness are  normal.  No significant valvular abnormalities were noted.  This study was compared with the study from 8/10/16: No significant changes  noted.  ______________________________________________________________________________  Left Ventricle  Global and regional left ventricular function is normal with an EF of 60-65%.  Left ventricular size is normal. Left ventricular wall thickness is normal.  Left ventricular diastolic function is normal.     Right Ventricle  Right ventricular function, chamber size, wall motion, and thickness are  normal.     Atria  Both atria appear normal.     Mitral Valve  The mitral valve is normal. Trace mitral insufficiency is present.     Aortic Valve  The valve leaflets are not well visualized. On Doppler interrogation, there is  no significant stenosis or regurgitation.     Tricuspid Valve  The tricuspid valve is normal. Trace tricuspid insufficiency is present. The  peak velocity of the tricuspid regurgitant jet is not obtainable. Pulmonary  artery systolic pressure cannot be assessed.     Pulmonic Valve  The valve leaflets are not well visualized. On Doppler interrogation, there is  no significant stenosis or regurgitation.     Vessels  The aorta root is normal. The thoracic  aorta is normal. The pulmonary artery  cannot be assessed. The inferior vena cava was normal in size with preserved  respiratory variability. IVC diameter <2.1 cm collapsing >50% with sniff  suggests a normal RA pressure of 3 mmHg.     Pericardium  No pericardial effusion is present.     Compared to Previous Study  This study was compared with the study from 8/10/16 . No significant changes  noted.  ______________________________________________________________________________  MMode/2D Measurements & Calculations  IVSd: 0.73 cm  LVIDd: 3.5 cm  LVIDs: 2.3 cm  LVPWd: 0.78 cm  FS: 32.9 %  LV mass(C)d: 68.0 grams  LV mass(C)dI: 47.0 grams/m2  Ao root diam: 3.1 cm  asc Aorta Diam: 3.1 cm  LVOT diam: 2.0 cm  LVOT area: 3.1 cm2  Ao root diam index Ht(cm/m): 2.0  Ao root diam index BSA (cm/m2): 2.1  Asc Ao diam index BSA (cm/m2): 2.1  Asc Ao diam index Ht(cm/m): 1.9  LA Volume (BP): 26.4 ml     LA Volume Index (BP): 18.2 ml/m2  RWT: 0.45  TAPSE: 1.4 cm     Doppler Measurements & Calculations  MV E max fabio: 80.9 cm/sec  MV A max fabio: 73.3 cm/sec  MV E/A: 1.1  MV dec time: 0.19 sec  Ao V2 max: 110.0 cm/sec  Ao max P.8 mmHg  Ao V2 mean: 72.7 cm/sec  Ao mean PG: 3.0 mmHg  Ao V2 VTI: 20.7 cm  LIDIA(I,D): 2.6 cm2  LIDIA(V,D): 2.7 cm2  LV V1 max PG: 3.6 mmHg  LV V1 max: 94.4 cm/sec  LV V1 VTI: 17.6 cm  SV(LVOT): 54.6 ml  SI(LVOT): 37.7 ml/m2  AV Fabio Ratio (DI): 0.86  LIDIA Index (cm2/m2): 1.8  E/E' av.1     Lateral E/e': 9.0  Medial E/e': 9.2  RV S Fabio: 12.8 cm/sec     ______________________________________________________________________________  Report approved by: Abdelrahman Cottrell MD on 2025 02:45 PM           *Note: Due to a large number of results and/or encounters for the requested time period, some results have not been displayed. A complete set of results can be found in Results Review.       Discharge Medications   Discharge Medication List as of 2025 12:34 PM        START taking these medications    Details    furosemide (LASIX) 40 MG tablet Take 1 tablet (40 mg) by mouth daily., Disp-30 tablet, R-0, E-Prescribe      insulin aspart (NOVOLOG PEN) 100 UNIT/ML pen Continue Novolog Meal Coverage: 1 unit per 14 grams CHO, TID AC and 1:15 PRN with snacks/supplements - Continue Novolog Correction Scale: 1:75>150 TID AC, HS, 0200 at 1400, Disp-15 mL, R-11, E-Prescribe      Naloxone HCl 8 MG/0.1ML LIQD Spray 1 spray in nostril once as needed. INSERT THE TIP OF THE NOZZLE INTO ONE NOSTRIL AND FIRMLY PRESS PLUNGER TO GIVE ONE DOSE FOR SUSPECTED OPIOID OVERDOSE. CALL 911.  OPEN SECOND PACK, IF NEEDED,  AND REPEAT EVERY 2 TO 3 MINUTES IN ALTERNATE NOSTRIL  UNTIL MEDICAL ASSISTANCE ARRIVES., Disp-2 each, R-0, E-Prescribe      thiamine (B-1) 100 MG tablet Place 1 tablet (100 mg) into Feeding Tube daily., Disp-30 tablet, R-0, E-Prescribe           CONTINUE these medications which have CHANGED    Details   cyclobenzaprine (FLEXERIL) 5 MG tablet May take 1.5 tablets (7.5 mg) by mouth 3 times daily as needed for muscle spasms. May also take 1 tablet (5 mg) every evening as needed for muscle spasms., Disp-135 tablet, R-2, E-Prescribe      gabapentin (NEURONTIN) 400 MG capsule Take 1 capsule (400 mg) by mouth 3 times daily., Disp-90 capsule, R-2, E-Prescribe      insulin glargine (LANTUS PEN) 100 UNIT/ML pen Inject 4 Units subcutaneously every evening., Disp-15 mL, R-0, E-PrescribeIf Lantus is not covered by insurance, may substitute Basaglar or Semglee or other insulin glargine product per insurance preference at same dose and frequency.        lipase-protease-amylase (CREON 12) [id number] units CPEP Take 5 capsules by mouth 3 times daily (with meals)., Disp-450 capsule, R-3, E-Prescribe      oxyCODONE (ROXICODONE) 5 MG tablet Take 1-2 tablets (5-10 mg) by mouth every 6 hours as needed for pain. Take the lowest possible dose to control your pain, and use non-opioid pain options first. Decrease the number of pills you take each day  after you discharge until you are able to stop  completely., Disp-16 tablet, R-0, E-Prescribe           CONTINUE these medications which have NOT CHANGED    Details   acetaminophen (TYLENOL) 325 MG tablet Take 3 tablets (975 mg) by mouth every 8 hours, Historical      Alcohol Swabs PADS Use to swab the area of the injection or tiago as directed Per insurance coverage, Disp-200 each, R-1, E-Prescribe      alendronate (FOSAMAX) 70 MG tablet Take 1 tablet (70 mg) by mouth every 7 days On Sundays take first thing in the morning with plain water and remain upright for at least 30 minutes and until after first food of the day    Do not restart Fosamax (alendronate) until your difficulty swallow ing has resolved and you have finished the entire course of fluconazole (Diflucan)., Disp-4 tablet, R-0, Local PrintHold pending GI evaluation in 4-6 weeks      alum & mag hydroxide-simethicone (MYLANTA/MAALOX) 200-200-25 MG CHEW chewable tablet Take 1 tablet by mouth 3 times daily as needed for indigestion, Historical      Continuous Glucose  (DEXCOM G7 ) RICARDO Use to read blood sugars as per 's instructions., Disp-1 each, R-0, E-Prescribe      Continuous Glucose Sensor (DEXCOM G7 SENSOR) MISC Change every 10 days., Disp-3 each, R-5, E-Prescribe      diclofenac (FLECTOR) 1.3 % Patch Place 1 patch onto the skin 2 times daily, Disp-30 each, R-1, E-Prescribe      diclofenac (VOLTAREN) 1 % GEL 2 g Apply 2 g to skin four times a day as needed (to affected upper extremity joint(s)). Maximum 8g/day per joint, 16g/day total., Historical      Digestive Enzyme Cartridge (RELIZORB) Device Place 1 each (1 Device) into OG Tube 2 times daily. Administer as 1 cartridge every 12 hours, Disp-60 each, R-11, Normal      dronabinol (MARINOL) 2.5 MG capsule Take 2 capsules (5 mg) by mouth 2 times daily as needed, Disp-56 capsule, R-5, Local Print      !! DULoxetine (CYMBALTA) 30 MG capsule Take 1 capsule (30 mg) by mouth  daily With 60mg capsule for total dose of 90mg, Disp-90 capsule, R-0, E-Prescribe      !! DULoxetine (CYMBALTA) 60 MG EC capsule Take 1 capsule (60 mg) by mouth daily With 30mg capsule for total dose of 90mg, Disp-90 capsule, R-1, E-Prescribe      famotidine (PEPCID) 20 MG tablet Take 1 tablet (20 mg) by mouth At Bedtime, Disp-30 tablet, R-0, E-Prescribe      Glucagon (GVOKE HYPOPEN 2-PACK) 1 MG/0.2ML pen Inject the contents of 1 device under the skin into lower abdomen, outer thigh, or outer upper arm as needed for hypoglycemia. If no response after 15 minutes, additional 1 mg dose from a new device may be injected while waiting for emergency assistance. , Disp-0.4 mL, R-0, E-Prescribe      !! hydrocortisone (CORTEF) 10 MG tablet Take 10 mg in the morning and 10 mg along with a 5 mg tablet at bedtime for a total dose of 15 mg at bedtime. Watch for hypoglycemia recurrence., Historical      !! hydrocortisone (CORTEF) 5 MG tablet Take 5 mg by mouth At Bedtime along with a 10 mg tablet for a total dose of 15 mg, Historical      hydrocortisone sodium succinate PF (SOLU-CORTEF) 100 MG injection Inject 100mg (1 mL) into muscle in emergency or unable to take oral hydrocortisone. Go to emergency room if given. ActoVial NDC [address]-85. Note to pharmacy: Provide two 3ml syringes with 23g 1 inch needles., Disp-1 mL, R-0, E-Prescribe      Insulin Disposable Pump (OMNIPOD 5 G6 INTRO, GEN 5,) KIT 1 each See Admin Instructions Use continuously to infuse insulin., Disp-1 kit, R-0, E-Prescribe      insulin  UNIT/ML vial Inject 4 Units subcutaneously every morning AND 3 Units every evening., Disp-3 mL, R-1, E-Prescribe      insulin pen needle (PIP PEN NEEDLES 32G X 4MM) 32G X 4 MM miscellaneous Use 6 pen needles daily or as directed., Disp-200 each, R-5, E-Prescribe      !! [place] 1.5 Peptide Plain 325 mL Place 1,080 mLs into J tube daily. Infuse [place] Peptide 1.5 @ 45 ml/hr into J-tube via pump  Water flush: 120  ml tid + an additional 550 ml through flushes or oral intake  Kcals: 1662  Cartons per day: 3.5, Disp-31714 mL, R-11, Normal      levothyroxine (SYNTHROID/LEVOTHROID) 125 MCG tablet Take 125 mcg by mouth every morning (before breakfast)., Historical      linaclotide (LINZESS) 145 MCG capsule Take 145 mcg by mouth every morning (before breakfast) as needed, Historical      metoclopramide (REGLAN) 5 MG tablet Take 2 tablets (10 mg) by mouth 3 times daily, Disp-180 tablet, R-3, E-Prescribe      !! Nutritional Supplements (BOOST HIGH PROTEIN) LIQD After above baseline labs are drawn, give: 6 mL/kg to maximum of 360 mL; the beverage is to be consumed within 5 minutes., R-0, No Print OutIf on pancreatic enzymes, patient may take home enzymes with Boost beverage.      Patients may take long acting i nsulin (Levemir and Lantus).      Patient should NOT cover Boost with Novolog, Humalog, Apidra, or regular insulin.      ondansetron (ZOFRAN) 4 MG tablet Take 1 tablet (4 mg) by mouth every 8 hours as needed for nausea, Disp-30 tablet, R-0, E-Prescribe      order for DME by [employer] [address] [place]  Ph: [phone]  Fax: [phone]    Nutren 1.5 @ 10 ml/hr with advancement by 10 ml/hr q 8 hours to goal rate of 40 ml/hr.  This will provide 1440 kcals (27 kcal/kg/day), 65 g PRO (1.2 g /kg/day), 730 mL H2O, 169 g CHO and no Fiber daily.   Disp-1 Month, R-3, INES, Local Print      pantoprazole (PROTONIX) 40 MG EC tablet Take 1 tablet (40 mg) by mouth 2 times daily, Disp-180 tablet, R-3, E-Prescribe      polyethylene glycol (MIRALAX/GLYCOLAX) packet Take 1 packet by mouth 2 times daily., Disp-14 each, R-5, Historical      potassium chloride ER (KLOR-CON M) 20 MEQ CR tablet Take 1 tablet (20 mEq) by mouth daily, Disp-90 tablet, R-1, Local PrintHold until pcp follow-up      senna-docusate (SENOKOT-S/PERICOLACE) 8.6-50 MG tablet Take 1-2 tablets by mouth daily as needed for constipation, Disp-60  tablet, R-3, E-Prescribe      Sharps Container MISC Use as directed to dispose of needles, lancets and other sharps, Disp-1 each, R-1, E-Prescribe      sodium chloride (OCEAN) 0.65 % nasal spray Spray 1 spray into both nostrils daily as needed for congestion, Disp-30 mL, R-0, E-Prescribe      sodium chloride, PF, 0.9% PF flush Inject 10-40 mLs into catheter every 8 hours. -- Flush J port and G port EACH with 30 ml water every 8 hours -- Flush J port with 30 ml water BEFORE AND AFTER medications to avoid clogging of this tube., Disp-40 mL, R-0, E-Prescribe      sucralfate (CARAFATE) 1 GM tablet Take 1 tablet (1 g) by mouth 4 times daily (before meals and nightly), Disp-30 tablet, R-0, E-Prescribe      SUMAtriptan (IMITREX) 50 MG tablet Take 1 tablet (50 mg) by mouth at onset of headache for migraine Take 1 Tab by mouth Once as needed for Migraine Headache. May repeat after two hours.  Maximum dose 200 mg/24 hours., Disp-30 tablet, R-1, Fax      topiramate (TOPAMAX) 100 MG tablet Take 1 tablet (100 mg) by mouth 2 times daily, Disp-180 tablet, R-1, E-Prescribe      traZODone (DESYREL) 100 MG tablet TAKE 1-2 TABLETS BY MOUTH AN HOUR BEFORE BEDTIME, Disp-100 tablet, R-2, E-Prescribe      Acetone, Urine, Test (KETONE TEST) STRP 1 each by In Vitro route as needed (hyperglycemia), Disp-50 strip, R-0, E-Prescribe      CONTOUR NEXT TEST test strip USE TO TEST BLOOD SUGAR 6 TIMES DAILY OR AS DIRECTED. Call clinic to schedule follow up appointment.  IMPORTANT, Disp-600 strip, R-1, INES, E-Prescribe      Injection Device for insulin (NOVOPEN ECHO) RICARDO Use with   Insulin, Disp-1 each, R-0, E-Prescribe       !! - Potential duplicate medications found. Please discuss with provider.        STOP taking these medications       dicyclomine (BENTYL) 10 MG capsule Comments:   Reason for Stopping:         insulin aspart (NOVOPEN ECHO) 100 UNIT/ML cartridge Comments:   Reason for Stopping:         insulin aspart (NOVOPEN ECHO) 100 UNIT/ML  cartridge Comments:   Reason for Stopping:         naloxone (NARCAN) 0.4 MG/ML injection Comments:   Reason for Stopping:             Allergies   Allergies   Allergen Reactions    Corticosteroids Other (See Comments)     All oral, IV and injectable steroids are contraindicated (unless in life threatening situations) in [facility] transplant recipients. They can cause irreversible loss of islet cell function. Please contact patient's transplant care coordinator YURI Cortez RN at [phone]/pager [phone] and/or endocrinologist prior to administration.      Chocolate Flavoring Agent (Non-Screening) Rash     Breaks out when eats chocolate

## 2025-05-07 NOTE — TELEPHONE ENCOUNTER
[facility] Call Center    Phone Message    May a detailed message be left on voicemail: yes     Reason for Call: Other: Per Daisy would like to ask if the team can refax the form #[phone] page 5. They didn't receive page 5 out of all 8 pages. [place] if the team cannot find find page 5 please call her so she can refax it again so the team can fill it out. Thank you!     Action Taken: Message routed to:  [facility]    Travel Screening: Not Applicable     Date of Service:

## 2025-05-08 ENCOUNTER — PATIENT OUTREACH (OUTPATIENT)
Dept: INTERNAL MEDICINE | Facility: CLINIC | Age: 60
End: 2025-05-08
Payer: MEDICARE

## 2025-05-08 ENCOUNTER — TELEPHONE (OUTPATIENT)
Dept: WOUND CARE | Facility: CLINIC | Age: 60
End: 2025-05-08
Payer: MEDICARE

## 2025-05-08 ENCOUNTER — TELEPHONE (OUTPATIENT)
Dept: INTERNAL MEDICINE | Facility: CLINIC | Age: 60
End: 2025-05-08
Payer: MEDICARE

## 2025-05-08 PROCEDURE — S9342 HIT ENTERAL PUMP DIEM: HCPCS

## 2025-05-08 NOTE — TELEPHONE ENCOUNTER
Patient confirmed scheduled appointment:  Date: 6/12/2025  Time: 130 pm   Visit type: New Wound Nurse   Provider: Allie Pleitez RN   Location: [facility]   Testing/imaging: n/a  Additional notes: Pt being referred by

## 2025-05-08 NOTE — TELEPHONE ENCOUNTER
MTM referral from: Transitions of Care (recent hospital discharge, TCU discharge, or ED visit)    MTM referral outreach attempt #1 on May 8, 2025 at 1:39 PM      Outcome: Patient is not interested at this time because .Patient has no questions or concers regarding medications at this time.      Use vbc for the carrier/Plan on the flowsheet      Narda Tavares CMA  MTM

## 2025-05-08 NOTE — PROGRESS NOTES
Clinic Care Coordination Contact  Clinic Care Coordination Contact  OUTREACH with Post Discharge Assessment    Referral Information:  Referred by Dr. Merlos for Complex Medical Concerns/Education related to Chronic Diagnosis (type 1 diabetes) on 4/17/25.      We placed a coordination care referral and notified our clinic RN Jose for close follow up and hopefully will be able to facilitate immediate clinic visit post admission, although we have not been able to reach out to her in the past few months despite our best efforts. Pt remains at high risk of readmission due to non compliance and lack of follow up.       Chief Complaint   Patient presents with    Clinic Care Coordination - Initial    Clinic Care Coordination - Post Hospital        Universal Utilization: 91.6% Admission or ED Risk      Utilization      No Show Count (past year)  4             ED Visits  2             Hospital Admissions  2                    Current as of: 5/8/2025  9:29 AM                Clinical Concerns:  Current Medical Concerns:  See discharge summary- primary concerns are related to pain.    Current Behavioral Concerns: N/A    Education Provided to patient: See TCM call       Health Maintenance Reviewed:    Clinical Pathway: None - pt is establishing with endocrinology, will review plan following that     Transitions of Care Outreach    Most Recent Admission Date: 4/30/2025   Most Recent Admission Diagnosis: Peripheral edema - R60.0  Generalized abdominal pain - R10.84  Hyperglycemia - R73.9  Encounter for feeding tube placement - Z46.59     Most Recent Discharge Date: 5/6/2025   Most Recent Discharge Diagnosis: Peripheral edema - R60.0  Generalized abdominal pain - R10.84  Hyperglycemia - R73.9  Encounter for feeding tube placement - Z46.59  Chest pain, unspecified type - R07.9  Type 1 diabetes mellitus with hypoglycemia and without coma (H) - E10.649  Nausea - R11.0  RUQ abdominal pain - R10.11  Exocrine pancreatic insufficiency -  K86.81  Gastroesophageal reflux disease without esophagitis - K21.9     Transitions of Care Assessment    Discharge Assessment  How are you doing now that you are home?: RN called and spoke with patient since being discharged from hospital. Pt shares that her main concern is pain management. She shares that she was getting oxy 10mg q4 hours in the hospital. She is now getting 5mg q6 hours and she states that this is not helpful for her. RN did share that her range is for 5-10mg, pt states that she will run out quickly if she takes 10mg. She reports only having 6-8 left. She does have flexeril and gabapentin was increased. She establishes with pain management this summer, hasn't seen them yet. She does have hospital follow up on 5/13 with provider in [place] due to Dr. Morgan not being available. RN shared would discuss pain plan moving forward with Dr. Morgan. Pt agrees to continued care coordination.  How are your symptoms? (Red Flag symptoms escalate to triage hotline per guidelines): Unchanged  Do you know how to contact your clinic care team if you have future questions or changes to your health status? : Yes  Does the patient have their discharge instructions? : Yes  Does the patient have questions regarding their discharge instructions? : No  Were you started on any new medications or were there changes to any of your previous medications? : Yes  Does the patient have all of their medications?: Yes  Do you have questions regarding any of your medications? : No  Do you have all of your needed medical supplies or equipment (DME)?  (i.e. oxygen tank, CPAP, cane, etc.): Yes              Medication Management:  Medication review status: Medications reviewed and no changes reported per patient.           Lifestyle & Psychosocial Needs:    Social Drivers of Health     Food Insecurity: Low Risk  (5/4/2025)    Food Insecurity     Within the past 12 months, did you worry that your food would run out before you got  money to buy more?: No     Within the past 12 months, did the food you bought just not last and you didn t have money to get more?: No   Recent Concern: Food Insecurity - High Risk (4/16/2025)    Food Insecurity     Within the past 12 months, did you worry that your food would run out before you got money to buy more?: Yes     Within the past 12 months, did the food you bought just not last and you didn t have money to get more?: Yes   Depression: At risk (1/23/2025)    PHQ-2     PHQ-2 Score: 6   Housing Stability: Low Risk  (5/4/2025)    Housing Stability     Do you have housing? : Yes     Are you worried about losing your housing?: No   Tobacco Use: Low Risk  (4/17/2025)    Patient History     Smoking Tobacco Use: Never     Smokeless Tobacco Use: Never     Passive Exposure: Not on file   Financial Resource Strain: Low Risk  (5/4/2025)    Financial Resource Strain     Within the past 12 months, have you or your family members you live with been unable to get utilities (heat, electricity) when it was really needed?: No   Alcohol Use: Not on file   Transportation Needs: Low Risk  (5/4/2025)    Transportation Needs     Within the past 12 months, has lack of transportation kept you from medical appointments, getting your medicines, non-medical meetings or appointments, work, or from getting things that you need?: No   Physical Activity: Not on file   Interpersonal Safety: Low Risk  (5/4/2025)    Interpersonal Safety     Do you feel physically and emotionally safe where you currently live?: Yes     Within the past 12 months, have you been hit, slapped, kicked or otherwise physically hurt by someone?: No     Within the past 12 months, have you been humiliated or emotionally abused in other ways by your partner or ex-partner?: No   Stress: Not on file   Social Connections: Not on file   Health Literacy: Not on file         Care Plan:   Care Plan: Chronic Pain       Problem: Chronic Pain is Not Self-Managed        Long-Range Goal: Demonstrate improved self-management of chronic pain       Start Date: 5/8/2025    This Visit's Progress: On track    Priority: High    Note:     Barriers: patient has chronic pain, feels it is not well managed  Strengths: patient is engaged and motivated to navigate healthcare management  Patient expressed understanding of goal: yes   Action steps to achieve this goal:  1. I will attend [facility] pain management appointment.  2. I will continue to work with Dr. Morgan to manage pain as well.                               Patient/Caregiver understanding: Patient states understanding. Patient has no further questions or concerns regarding above assessment. Patient is aware to call the clinic and reach out to the care team with any further questions.          Future Appointments                In 5 days Get Sahu MD [facility]    In 5 days Libby Jay RN [facility]    In 1 week Ron Morgan MD [facility] Internal Medicine [facility]    In 1 month Evan Kapadia MD [facility] for Comprehensive Pain Management [facility]    In 1 month Joey Feldman PA-C [facility]    In 3 months Amena Maher MD [facility] Transplant [facility]            Follow up Plan   Telephone encounter created with request to review pain plan and sent to RN pool to review with PCP.   Discharge Follow-Up  Discharge follow up appointment scheduled in alignment with recommended follow up timeframe or Transitions of Risk Category? (Low = within 30 days; Moderate= within 14 days; High= within 7 days): Yes  Discharge Follow Up Appointment Date: 05/13/25  Discharge Follow Up Appointment Scheduled with?: Primary Care Provider (PCP at [place]- Dr. Morgan not available)    Future Appointments   Date Time Provider Department  [place]   5/13/2025 10:30 AM Get Sahu MD [facility]   5/13/2025 12:30 PM Libby Jay RN [facility]   5/20/2025  2:30 PM Ron Morgan MD [facility]   6/10/2025  1:45 PM Evan Kapadia MD [facility]   6/18/2025 11:30 AM Joey Feldman PA-C [facility]   8/21/2025  1:40 PM Amena Maher MD [facility]       Outpatient Plan as outlined on AVS reviewed with patient.    For any urgent concerns, please contact our 24 hour nurse triage line: [phone] ([phone])

## 2025-05-09 PROCEDURE — S9342 HIT ENTERAL PUMP DIEM: HCPCS

## 2025-05-10 PROCEDURE — S9342 HIT ENTERAL PUMP DIEM: HCPCS

## 2025-05-11 PROCEDURE — S9342 HIT ENTERAL PUMP DIEM: HCPCS

## 2025-05-12 PROCEDURE — S9342 HIT ENTERAL PUMP DIEM: HCPCS

## 2025-05-13 ENCOUNTER — OFFICE VISIT (OUTPATIENT)
Dept: FAMILY MEDICINE | Facility: CLINIC | Age: 60
End: 2025-05-13
Payer: MEDICARE

## 2025-05-13 ENCOUNTER — LAB (OUTPATIENT)
Dept: LAB | Facility: CLINIC | Age: 60
End: 2025-05-13
Payer: MEDICARE

## 2025-05-13 ENCOUNTER — OFFICE VISIT (OUTPATIENT)
Dept: EDUCATION SERVICES | Facility: CLINIC | Age: 60
End: 2025-05-13
Attending: PEDIATRICS
Payer: MEDICARE

## 2025-05-13 VITALS
WEIGHT: 107 LBS | HEART RATE: 94 BPM | TEMPERATURE: 98.6 F | BODY MASS INDEX: 19.69 KG/M2 | SYSTOLIC BLOOD PRESSURE: 131 MMHG | HEIGHT: 62 IN | OXYGEN SATURATION: 100 % | DIASTOLIC BLOOD PRESSURE: 76 MMHG

## 2025-05-13 DIAGNOSIS — R53.81 PHYSICAL DECONDITIONING: ICD-10-CM

## 2025-05-13 DIAGNOSIS — E27.40 ADRENAL INSUFFICIENCY: ICD-10-CM

## 2025-05-13 DIAGNOSIS — K31.84 GASTROPARESIS: ICD-10-CM

## 2025-05-13 DIAGNOSIS — E89.1 POST-PANCREATECTOMY DIABETES (H): ICD-10-CM

## 2025-05-13 DIAGNOSIS — R60.0 BILATERAL LEG EDEMA: ICD-10-CM

## 2025-05-13 DIAGNOSIS — R10.84 GENERALIZED ABDOMINAL PAIN: ICD-10-CM

## 2025-05-13 DIAGNOSIS — E13.9 POST-PANCREATECTOMY DIABETES (H): ICD-10-CM

## 2025-05-13 DIAGNOSIS — K86.81 EXOCRINE PANCREATIC INSUFFICIENCY: ICD-10-CM

## 2025-05-13 DIAGNOSIS — Z90.410 POST-PANCREATECTOMY DIABETES (H): ICD-10-CM

## 2025-05-13 DIAGNOSIS — E43 SEVERE PROTEIN-CALORIE MALNUTRITION: ICD-10-CM

## 2025-05-13 DIAGNOSIS — K21.9 GASTROESOPHAGEAL REFLUX DISEASE WITHOUT ESOPHAGITIS: ICD-10-CM

## 2025-05-13 DIAGNOSIS — F41.1 GAD (GENERALIZED ANXIETY DISORDER): ICD-10-CM

## 2025-05-13 DIAGNOSIS — F51.01 PRIMARY INSOMNIA: ICD-10-CM

## 2025-05-13 DIAGNOSIS — G89.29 CHRONIC BACK PAIN, UNSPECIFIED BACK LOCATION, UNSPECIFIED BACK PAIN LATERALITY: ICD-10-CM

## 2025-05-13 DIAGNOSIS — G43.009 MIGRAINE WITHOUT AURA AND WITHOUT STATUS MIGRAINOSUS, NOT INTRACTABLE: ICD-10-CM

## 2025-05-13 DIAGNOSIS — K59.00 CONSTIPATION, UNSPECIFIED CONSTIPATION TYPE: ICD-10-CM

## 2025-05-13 DIAGNOSIS — E03.9 HYPOTHYROIDISM, UNSPECIFIED TYPE: ICD-10-CM

## 2025-05-13 DIAGNOSIS — Z09 HOSPITAL DISCHARGE FOLLOW-UP: Primary | ICD-10-CM

## 2025-05-13 DIAGNOSIS — M54.9 CHRONIC BACK PAIN, UNSPECIFIED BACK LOCATION, UNSPECIFIED BACK PAIN LATERALITY: ICD-10-CM

## 2025-05-13 LAB
ERYTHROCYTE [DISTWIDTH] IN BLOOD BY AUTOMATED COUNT: 12.4 % (ref 10–15)
HCT VFR BLD AUTO: 33.6 % (ref 35–47)
HGB BLD-MCNC: 11.9 G/DL (ref 11.7–15.7)
MCH RBC QN AUTO: 32.1 PG (ref 26.5–33)
MCHC RBC AUTO-ENTMCNC: 35.4 G/DL (ref 31.5–36.5)
MCV RBC AUTO: 91 FL (ref 78–100)
PLATELET # BLD AUTO: 674 10E3/UL (ref 150–450)
RBC # BLD AUTO: 3.71 10E6/UL (ref 3.8–5.2)
T4 FREE SERPL-MCNC: 0.73 NG/DL (ref 0.9–1.7)
TSH SERPL DL<=0.005 MIU/L-ACNC: 5.66 UIU/ML (ref 0.3–4.2)
WBC # BLD AUTO: 10.7 10E3/UL (ref 4–11)

## 2025-05-13 PROCEDURE — 3075F SYST BP GE 130 - 139MM HG: CPT | Performed by: INTERNAL MEDICINE

## 2025-05-13 PROCEDURE — 99495 TRANSJ CARE MGMT MOD F2F 14D: CPT | Performed by: INTERNAL MEDICINE

## 2025-05-13 PROCEDURE — 80053 COMPREHEN METABOLIC PANEL: CPT

## 2025-05-13 PROCEDURE — S9342 HIT ENTERAL PUMP DIEM: HCPCS

## 2025-05-13 PROCEDURE — 1125F AMNT PAIN NOTED PAIN PRSNT: CPT | Performed by: INTERNAL MEDICINE

## 2025-05-13 PROCEDURE — 84443 ASSAY THYROID STIM HORMONE: CPT

## 2025-05-13 PROCEDURE — 3078F DIAST BP <80 MM HG: CPT | Performed by: INTERNAL MEDICINE

## 2025-05-13 PROCEDURE — 84439 ASSAY OF FREE THYROXINE: CPT

## 2025-05-13 PROCEDURE — 85027 COMPLETE CBC AUTOMATED: CPT

## 2025-05-13 PROCEDURE — 36415 COLL VENOUS BLD VENIPUNCTURE: CPT

## 2025-05-13 RX ORDER — OXYCODONE HYDROCHLORIDE 5 MG/1
5-10 TABLET ORAL EVERY 6 HOURS PRN
Qty: 16 TABLET | Refills: 0 | Status: ON HOLD | OUTPATIENT
Start: 2025-05-13

## 2025-05-13 RX ORDER — TRAZODONE HYDROCHLORIDE 100 MG/1
TABLET ORAL
Qty: 100 TABLET | Refills: 0 | Status: ON HOLD | OUTPATIENT
Start: 2025-05-13

## 2025-05-13 ASSESSMENT — PATIENT HEALTH QUESTIONNAIRE - PHQ9
10. IF YOU CHECKED OFF ANY PROBLEMS, HOW DIFFICULT HAVE THESE PROBLEMS MADE IT FOR YOU TO DO YOUR WORK, TAKE CARE OF THINGS AT HOME, OR GET ALONG WITH OTHER PEOPLE: EXTREMELY DIFFICULT
SUM OF ALL RESPONSES TO PHQ QUESTIONS 1-9: 22
SUM OF ALL RESPONSES TO PHQ QUESTIONS 1-9: 22

## 2025-05-13 ASSESSMENT — PAIN SCALES - GENERAL: PAINLEVEL_OUTOF10: SEVERE PAIN (9)

## 2025-05-13 NOTE — PROGRESS NOTES
Assessment & Plan     1.  Hospital discharge follow-up.  2.  Severe protein calorie malnutrition currently getting enteral feeding via gastrostomy jejunal tube feeding.  3.  Bilateral leg edema most likely related to poor nutritional status and low proteins.  Currently on furosemide 40 mg a day.  4.  Gastroparesis probably combination of previous gastric surgery and diabetes.  5.  Post pancreatectomy diabetes insulin-dependent type.  Blood sugars are running high.  In care of endocrinologist Dr. Maher.  Also seen by nutritionist as well as diabetic educator.  Currently using Dexcom 7 and is on OmniPod.  6.  Adrenal insufficiency suspected and being treated with Cortef by endocrinology.  7.  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  8.  Constipation possibly related to dysmotility and slow transit time from diabetes and currently being treated with Linzess  9.  Chronic back pain.  Is using opioid.  Has a pain clinic appointment pending.  10.  Generalized abdominal pain.  Short refill of oxycodone provided until she sees her primary provider next week.  11.  Hypothyroidism adequately treated with levothyroxine.  12.  GERD being treated with PPI.  13.  Migraine been treated with topiramate and sumatriptan.  14.  Generalized anxiety disorder.  15.  Primary insomnia continue with trazodone.  16.  Elevated CEA.  She had a recent CT abdomen pelvis.  In [place] at [facility] in [facility] patient indicates she had gastroscopy and a colonoscopy in October/November 2024 that was negative.  She was asking if she should have a referral to oncology.  She will discuss that with her primary provider.  Personally I did not think that was necessary.  17.  Osteoporosis being treated with alendronate since 2020.  Her PCP will need to reassess length of treatment.    I will check CBC CMP TSH free T4.  I will get back to her results and the  results will be available for her primary provider when she sees them next week.      MED REC REQUIRED  Post Medication Reconciliation Status:       Saulo Abdul is a 59 year old, presenting for the following health issues:  Hospital F/U        5/13/2025    10:29 AM   Additional Questions   Roomed by Gita     Via the Health Maintenance questionnaire, the patient has reported the following services have been completed -Eye Exam: [facility] 2024-08-12, this information has been sent to the abstraction team.  HPI          5/8/2025   Post Discharge Outreach   How are you doing now that you are home? RN called and spoke with patient since being discharged from hospital. Pt shares that her main concern is pain management. She shares that she was getting oxy 10mg q4 hours in the hospital. She is now getting 5mg q6 hours and she states that this is not helpful for her. RN did share that her range is for 5-10mg, pt states that she will run out quickly if she takes 10mg. She reports only having 6-8 left. She does have flexeril and gabapentin was increased. She establishes with pain management this summer, hasn't seen them yet. She does have hospital follow up on 5/13 with provider in [place] due to Dr. Morgan not being available. RN shared would discuss pain plan moving forward with Dr. Morgan. Pt agrees to continued care coordination.   How are your symptoms? (Red Flag symptoms escalate to triage hotline per guidelines) Unchanged   Does the patient have their discharge instructions?  Yes   Does the patient have questions regarding their discharge instructions?  No   Were you started on any new medications or were there changes to any of your previous medications?  Yes   Does the patient have all of their medications? Yes   Do you have questions regarding any of your medications?  No   Do you have all of your needed medical supplies or equipment (DME)?  (i.e. oxygen tank, CPAP, cane, etc.) Yes   Discharge  "Follow Up Appointment Date 5/13/2025   Discharge Follow Up Appointment Scheduled with? Primary Care Provider       Hospital Follow-up Visit:    Hospital/Nursing Home/IP Rehab Facility: [facility]  Most Recent Admission Date: 4/30/2025   Most Recent Admission Diagnosis: Peripheral edema - R60.0  Generalized abdominal pain - R10.84  Hyperglycemia - R73.9  Encounter for feeding tube placement - Z46.59     Most Recent Discharge Date: 5/6/2025   Most Recent Discharge Diagnosis: Peripheral edema - R60.0  Generalized abdominal pain - R10.84  Hyperglycemia - R73.9  Encounter for feeding tube placement - Z46.59  Chest pain, unspecified type - R07.9  Type 1 diabetes mellitus with hypoglycemia and without coma (H) - E10.649  Nausea - R11.0  RUQ abdominal pain - R10.11  Exocrine pancreatic insufficiency - K86.81  Gastroesophageal reflux disease without esophagitis - K21.9   Was the patient in the ICU or did the patient experience delirium during hospitalization?  No  Do you have any other stressors you would like to discuss with your provider? No    Problems taking medications regularly:  None  Medication changes since discharge: None  Problems adhering to non-medication therapy:  None    Summary of hospitalization:  [facility] hospital discharge summary reviewed  Diagnostic Tests/Treatments reviewed.  Follow up needed: none  Other Healthcare Providers Involved in Patient s Care:         None  Update since discharge: worsened.         Plan of care communicated with patient                 Review of Systems  Constitutional, HEENT, cardiovascular, pulmonary, GI, , musculoskeletal, neuro, skin, endocrine and psych systems are negative, except as otherwise noted.      Objective    /76 (BP Location: Right arm, Patient Position: Sitting, Cuff Size: Child)   Pulse 94   Temp 98.6  F (37  C) (Oral)   Ht 1.575 m (5' 2\")   Wt 48.5 kg (107 lb)   SpO2 100%   BMI 19.57 kg/m  "   Body mass index is 19.57 kg/m .  Physical Exam   GENERAL: alert and no distress  NECK: no adenopathy, no asymmetry, masses, or scars  RESP: lungs clear to auscultation - no rales, rhonchi or wheezes  CV: regular rates and rhythm, normal S1 S2, no S3 or S4, no murmur, click or rub, and 2+ bilateral lower extremity pitting edema to knees    ABDOMEN: soft, nontender, no hepatosplenomegaly, no masses and bowel sounds normal  MS: no gross musculoskeletal defects noted, no edema    No results displayed because visit has over 200 results.              Signed Electronically by: Get Sahu MD    Answers submitted by the patient for this visit:  Patient Health Questionnaire (Submitted on 5/13/2025)  If you checked off any problems, how difficult have these problems made it for you to do your work, take care of things at home, or get along with other people?: Extremely difficult  PHQ9 TOTAL SCORE: 22

## 2025-05-13 NOTE — LETTER
5/13/2025      Chantell Kidd  [address]  [facility] [address]      Dear Colleague,    Thank you for referring your patient, Chantell Kidd, to the [facility]. Please see a copy of my visit note below.    Diabetes Self-Management Education & Support    Chantell Kidd presents today for education related to Post Pancreatectomy Diabetes    Patient is being treated with:  MDI Insulin  She is accompanied by spouse    Year of diagnosis: 2012  Referring provider:  Dr. Maher  Living Situation: Home with spouse    PATIENT CONCERNS/REASON FOR REFERRAL   Restarting G7 and eventually OP5 pump    ASSESSMENT:    Taking Medication:     Current Diabetes Management per Patient:  Taking diabetes medications?   Lantus 18 units in PM and 18 units at HS  Novolog standard sliding scale, if reads high gives 10 units, ICR 1 unit per 12 grams of CHO's   NPH 8 units in the AM    Monitoring  Patient glucose self monitoring as follows: Every 4-6 hours per day  BG results: not available, reports reading HIGH     Patient's most recent   Lab Results   Component Value Date    A1C 15.8 04/30/2025    A1C 7.3 11/09/2020      Patient's A1C goal: <8.0    Activity: no regular exercise program    Healthy Eating:  Tube feeding continuously  Breakfast: Scrambled eggs, toast  Lunch: Yogurt, pudding  Dinner: Soup, ice cream   Water throughout day, crystal light, ginger ale, 7up, coffee with creamer     Problem Solving:    Patient is at risk of hypoglycemia?: YES, hypoglycemia unawareness  Hospitalizations for hyper or hypoglycemia: Yes (Please explain): DKA    Healthy Coping and Stress Management:   Sources of stress identified by patient:   Coping mechanisms identified by patient:  Other (please specify):  Not assessed      EDUCATION and INSTRUCTION PROVIDED AT THIS VISIT:    S/P Pancreatectomy seen to set up G7 and review glucose at the request of Dr. Maher. She is accompanied by spouse, Yannick. Most recent A1C=15.8. Complex  "PMH. She was recently discharged after G tube dislodged, GT replaced 5/2 with continuous tube feedings. Unfortunately, does not have glucometer with and did not receive G7 sensors yet. Reports checks glucose every 4-6 hours and mostly reads \"HI.\" Reports she misses Novolog dose once per day. She dose have standard corrections scale however will give herself 10 units Novolog if reads HI. She will wait 30 minutes, recheck glucose, and give additional. ICR 1:12. Also takes NPH 8 units in AM. She did throw away suspected old insulin pens. G7 started today with sample. No adjustments made today. I have asked her to take correction dose at meal time even if not eating. Reminded not to stack insulin however she and spouse did not seemed concerned due to likely islet failure and rarely has lows. Follow up in one week.    Topics Reviewed:  *Dexcom G7: purpose of sensor, variability between blood glucose and sensor values, meaning of trending arrows, meaning of compression lows, mobile phone cannot be further than 20 feet from sensor, cannot wear sensor when having MRI or CT scan, informed to not go through full body scanner at airport, and sensor is waterproof. Reminded that if sensor reading does not match symptoms, to check with fingerstick. Low alert set at 70. High alert set at 400. Informed urgent low 55 or less, cannot be turned off. There is a 26 minute warm up period. Data is least accurate the first 12-24 hours. Linked to clinic.   *Insulin; onset/peak/duration  *Complications from poor glucose control     Patient-stated goal written and given to Chantell Kidd.  Verbalized and demonstrated understanding of instructions.   See patient instructions  AVS printed and given to patient-via My Chart    PLAN:  No changes to insulin doses-Please set alarm or reminder note as reminder to take insulin  2.  Contact [facility] at [phone] regarding Dexcom G7 order  3.  Please remember to use correction scale " at meal time even if not eatin-200, take 1 unit  201-250, take 2 units  251-300, take 3 units  301-350, take 4 units  4. Correction (for high number) should be at minimum 4 hours apart    FOLLOW-UP:    * Video Visit with Libby at 1:30 PM    Time spent with patient at today's visit was 61 minutes.      Libby Jay RN, [facility]  Diabetes Education Department  [facility] [place]    Any diabetes medication initiation or dose changes were made via the [facility] Standing Orders per the patient's referring provider. A copy of this encounter was shared with the provider.      Again, thank you for allowing me to participate in the care of your patient.        Sincerely,        Libby Jay RN    Electronically signed

## 2025-05-13 NOTE — PATIENT INSTRUCTIONS
PLAN:  No changes to insulin doses-Please set alarm or reminder note as reminder to take insulin  2.  Contact [facility] at [phone] regarding Dexcom G7 order  3.  Please remember to use correction scale at meal time even if not eatin-200, take 1 unit  201-250, take 2 units  251-300, take 3 units  301-350, take 4 units  4. Correction (for high number) should be at minimum 4 hours apart    FOLLOW-UP:    * Video Visit with Libby at 1:30 PM

## 2025-05-13 NOTE — PROGRESS NOTES
Diabetes Self-Management Education & Support    Chantell Kidd presents today for education related to Post Pancreatectomy Diabetes    Patient is being treated with:  MDI Insulin  She is accompanied by spouse    Year of diagnosis: 2012  Referring provider:  Dr. Maher  Living Situation: Home with spouse    PATIENT CONCERNS/REASON FOR REFERRAL   Restarting G7 and eventually OP5 pump    ASSESSMENT:    Taking Medication:     Current Diabetes Management per Patient:  Taking diabetes medications?   Lantus 18 units in PM and 18 units at HS  Novolog standard sliding scale, if reads high gives 10 units, ICR 1 unit per 12 grams of CHO's   NPH 8 units in the AM    Monitoring  Patient glucose self monitoring as follows: Every 4-6 hours per day  BG results: not available, reports reading HIGH     Patient's most recent   Lab Results   Component Value Date    A1C 15.8 04/30/2025    A1C 7.3 11/09/2020      Patient's A1C goal: <8.0    Activity: no regular exercise program    Healthy Eating:  Tube feeding continuously  Breakfast: Scrambled eggs, toast  Lunch: Yogurt, pudding  Dinner: Soup, ice cream   Water throughout day, crystal light, ginger ale, 7up, coffee with creamer     Problem Solving:    Patient is at risk of hypoglycemia?: YES, hypoglycemia unawareness  Hospitalizations for hyper or hypoglycemia: Yes (Please explain): DKA    Healthy Coping and Stress Management:   Sources of stress identified by patient:   Coping mechanisms identified by patient:  Other (please specify):  Not assessed      EDUCATION and INSTRUCTION PROVIDED AT THIS VISIT:    S/P Pancreatectomy seen to set up G7 and review glucose at the request of Dr. Maher. She is accompanied by spouse, Yannick. Most recent A1C=15.8. Complex PMH. She was recently discharged after G tube dislodged, GT replaced 5/2 with continuous tube feedings. Unfortunately, does not have glucometer with and did not receive G7 sensors yet. Reports checks glucose every 4-6 hours and mostly  "reads \"HI.\" Reports she misses Novolog dose once per day. She dose have standard corrections scale however will give herself 10 units Novolog if reads HI. She will wait 30 minutes, recheck glucose, and give additional. ICR 1:12. Also takes NPH 8 units in AM. She did throw away suspected old insulin pens. G7 started today with sample. No adjustments made today. I have asked her to take correction dose at meal time even if not eating. Reminded not to stack insulin however she and spouse did not seemed concerned due to likely islet failure and rarely has lows. Follow up in one week.    Topics Reviewed:  *Dexcom G7: purpose of sensor, variability between blood glucose and sensor values, meaning of trending arrows, meaning of compression lows, mobile phone cannot be further than 20 feet from sensor, cannot wear sensor when having MRI or CT scan, informed to not go through full body scanner at airport, and sensor is waterproof. Reminded that if sensor reading does not match symptoms, to check with fingerstick. Low alert set at 70. High alert set at 400. Informed urgent low 55 or less, cannot be turned off. There is a 26 minute warm up period. Data is least accurate the first 12-24 hours. Linked to clinic.   *Insulin; onset/peak/duration  *Complications from poor glucose control     Patient-stated goal written and given to Chantell Kidd.  Verbalized and demonstrated understanding of instructions.   See patient instructions  AVS printed and given to patient-via My Chart    PLAN:  No changes to insulin doses-Please set alarm or reminder note as reminder to take insulin  2.  Contact [facility] at [phone] regarding Dexcom G7 order  3.  Please remember to use correction scale at meal time even if not eatin-200, take 1 unit  201-250, take 2 units  251-300, take 3 units  301-350, take 4 units  4. Correction (for high number) should be at minimum 4 hours apart    FOLLOW-UP:    * Video Visit with " Libby at 1:30 PM    Time spent with patient at today's visit was 61 minutes.      Libby Jay RN, [facility]  Diabetes Education Department  [facility], [place]    Any diabetes medication initiation or dose changes were made via the [facility] Standing Orders per the patient's referring provider. A copy of this encounter was shared with the provider.

## 2025-05-14 ENCOUNTER — MYC MEDICAL ADVICE (OUTPATIENT)
Dept: EDUCATION SERVICES | Facility: CLINIC | Age: 60
End: 2025-05-14
Payer: MEDICARE

## 2025-05-14 LAB
ALBUMIN SERPL BCG-MCNC: 4 G/DL (ref 3.5–5.2)
ALP SERPL-CCNC: 143 U/L (ref 40–150)
ALT SERPL W P-5'-P-CCNC: 41 U/L (ref 0–50)
ANION GAP SERPL CALCULATED.3IONS-SCNC: 15 MMOL/L (ref 7–15)
AST SERPL W P-5'-P-CCNC: 24 U/L (ref 0–45)
BILIRUB SERPL-MCNC: 0.2 MG/DL
BUN SERPL-MCNC: 19.5 MG/DL (ref 8–23)
CALCIUM SERPL-MCNC: 9.2 MG/DL (ref 8.8–10.4)
CHLORIDE SERPL-SCNC: 93 MMOL/L (ref 98–107)
CREAT SERPL-MCNC: 3.05 MG/DL (ref 0.51–0.95)
EGFRCR SERPLBLD CKD-EPI 2021: 17 ML/MIN/1.73M2
GLUCOSE SERPL-MCNC: 410 MG/DL (ref 70–99)
HCO3 SERPL-SCNC: 23 MMOL/L (ref 22–29)
POTASSIUM SERPL-SCNC: 4.1 MMOL/L (ref 3.4–5.3)
PROT SERPL-MCNC: 6.9 G/DL (ref 6.4–8.3)
SODIUM SERPL-SCNC: 131 MMOL/L (ref 135–145)

## 2025-05-14 PROCEDURE — S9342 HIT ENTERAL PUMP DIEM: HCPCS

## 2025-05-15 ENCOUNTER — HOSPITAL ENCOUNTER (INPATIENT)
Facility: CLINIC | Age: 60
End: 2025-05-15
Attending: STUDENT IN AN ORGANIZED HEALTH CARE EDUCATION/TRAINING PROGRAM | Admitting: STUDENT IN AN ORGANIZED HEALTH CARE EDUCATION/TRAINING PROGRAM
Payer: MEDICARE

## 2025-05-15 ENCOUNTER — APPOINTMENT (OUTPATIENT)
Dept: CT IMAGING | Facility: CLINIC | Age: 60
DRG: 391 | End: 2025-05-15
Attending: STUDENT IN AN ORGANIZED HEALTH CARE EDUCATION/TRAINING PROGRAM
Payer: MEDICARE

## 2025-05-15 ENCOUNTER — RESULTS FOLLOW-UP (OUTPATIENT)
Dept: FAMILY MEDICINE | Facility: CLINIC | Age: 60
End: 2025-05-15

## 2025-05-15 VITALS
OXYGEN SATURATION: 97 % | RESPIRATION RATE: 16 BRPM | HEART RATE: 81 BPM | TEMPERATURE: 97.5 F | DIASTOLIC BLOOD PRESSURE: 75 MMHG | SYSTOLIC BLOOD PRESSURE: 109 MMHG

## 2025-05-15 DIAGNOSIS — Z78.9 ON TUBE FEEDING DIET: ICD-10-CM

## 2025-05-15 DIAGNOSIS — K59.00 CONSTIPATION, UNSPECIFIED CONSTIPATION TYPE: ICD-10-CM

## 2025-05-15 DIAGNOSIS — G43.909 MIGRAINE, UNSPECIFIED, NOT INTRACTABLE, WITHOUT STATUS MIGRAINOSUS: ICD-10-CM

## 2025-05-15 DIAGNOSIS — E03.9 HYPOTHYROIDISM, UNSPECIFIED TYPE: ICD-10-CM

## 2025-05-15 DIAGNOSIS — G89.29 CHRONIC BACK PAIN, UNSPECIFIED BACK LOCATION, UNSPECIFIED BACK PAIN LATERALITY: ICD-10-CM

## 2025-05-15 DIAGNOSIS — T85.528A DISLODGED GASTROSTOMY TUBE: ICD-10-CM

## 2025-05-15 DIAGNOSIS — G43.719 INTRACTABLE CHRONIC MIGRAINE WITHOUT AURA AND WITHOUT STATUS MIGRAINOSUS: ICD-10-CM

## 2025-05-15 DIAGNOSIS — F33.1 MAJOR DEPRESSIVE DISORDER, RECURRENT EPISODE, MODERATE (H): ICD-10-CM

## 2025-05-15 DIAGNOSIS — Z46.59 ENCOUNTER FOR FEEDING TUBE PLACEMENT: ICD-10-CM

## 2025-05-15 DIAGNOSIS — N17.9 AKI (ACUTE KIDNEY INJURY): ICD-10-CM

## 2025-05-15 DIAGNOSIS — K86.81 EXOCRINE PANCREATIC INSUFFICIENCY: ICD-10-CM

## 2025-05-15 DIAGNOSIS — R10.11 RUQ ABDOMINAL PAIN: ICD-10-CM

## 2025-05-15 DIAGNOSIS — R14.0 ABDOMINAL BLOATING: ICD-10-CM

## 2025-05-15 DIAGNOSIS — F11.90 CHRONIC, CONTINUOUS USE OF OPIOIDS: ICD-10-CM

## 2025-05-15 DIAGNOSIS — R73.9 HYPERGLYCEMIA: ICD-10-CM

## 2025-05-15 DIAGNOSIS — K59.00 CONSTIPATION: ICD-10-CM

## 2025-05-15 DIAGNOSIS — K59.09 CHRONIC CONSTIPATION: ICD-10-CM

## 2025-05-15 DIAGNOSIS — M54.9 CHRONIC BACK PAIN, UNSPECIFIED BACK LOCATION, UNSPECIFIED BACK PAIN LATERALITY: ICD-10-CM

## 2025-05-15 DIAGNOSIS — E10.649 TYPE 1 DIABETES MELLITUS WITH HYPOGLYCEMIA AND WITHOUT COMA (H): ICD-10-CM

## 2025-05-15 DIAGNOSIS — F51.01 PRIMARY INSOMNIA: ICD-10-CM

## 2025-05-15 DIAGNOSIS — R10.84 GENERALIZED ABDOMINAL PAIN: ICD-10-CM

## 2025-05-15 DIAGNOSIS — R53.81 PHYSICAL DECONDITIONING: ICD-10-CM

## 2025-05-15 DIAGNOSIS — M25.562 LEFT KNEE PAIN, UNSPECIFIED CHRONICITY: ICD-10-CM

## 2025-05-15 DIAGNOSIS — K31.84 GASTROPARESIS: Primary | ICD-10-CM

## 2025-05-15 DIAGNOSIS — R11.0 NAUSEA: ICD-10-CM

## 2025-05-15 DIAGNOSIS — A04.8 H. PYLORI INFECTION: ICD-10-CM

## 2025-05-15 DIAGNOSIS — K59.00 OBSTIPATION: ICD-10-CM

## 2025-05-15 DIAGNOSIS — R74.01 TRANSAMINITIS: ICD-10-CM

## 2025-05-15 LAB
ALBUMIN SERPL BCG-MCNC: 4 G/DL (ref 3.5–5.2)
ALBUMIN UR-MCNC: NEGATIVE MG/DL
ALP SERPL-CCNC: 152 U/L (ref 40–150)
ALT SERPL W P-5'-P-CCNC: 89 U/L (ref 0–50)
ANION GAP SERPL CALCULATED.3IONS-SCNC: 15 MMOL/L (ref 7–15)
APPEARANCE UR: CLEAR
AST SERPL W P-5'-P-CCNC: 227 U/L (ref 0–45)
ATRIAL RATE - MUSE: 94 BPM
B-OH-BUTYR SERPL-SCNC: <0.18 MMOL/L
BASE EXCESS BLDV CALC-SCNC: 8.3 MMOL/L (ref -3–3)
BASOPHILS # BLD AUTO: 0.1 10E3/UL (ref 0–0.2)
BASOPHILS NFR BLD AUTO: 1 %
BILIRUB SERPL-MCNC: 0.3 MG/DL
BILIRUB UR QL STRIP: NEGATIVE
BUN SERPL-MCNC: 3.6 MG/DL (ref 8–23)
CALCIUM SERPL-MCNC: 9.3 MG/DL (ref 8.8–10.4)
CHLORIDE SERPL-SCNC: 94 MMOL/L (ref 98–107)
COLOR UR AUTO: ABNORMAL
CREAT SERPL-MCNC: 0.41 MG/DL (ref 0.51–0.95)
DIASTOLIC BLOOD PRESSURE - MUSE: NORMAL MMHG
EGFRCR SERPLBLD CKD-EPI 2021: >90 ML/MIN/1.73M2
EOSINOPHIL # BLD AUTO: 0.1 10E3/UL (ref 0–0.7)
EOSINOPHIL NFR BLD AUTO: 1 %
ERYTHROCYTE [DISTWIDTH] IN BLOOD BY AUTOMATED COUNT: 12.6 % (ref 10–15)
GLUCOSE BLDC GLUCOMTR-MCNC: 281 MG/DL (ref 70–99)
GLUCOSE BLDC GLUCOMTR-MCNC: 340 MG/DL (ref 70–99)
GLUCOSE BLDC GLUCOMTR-MCNC: 354 MG/DL (ref 70–99)
GLUCOSE BLDC GLUCOMTR-MCNC: 407 MG/DL (ref 70–99)
GLUCOSE SERPL-MCNC: 571 MG/DL (ref 70–99)
GLUCOSE UR STRIP-MCNC: >=1000 MG/DL
HCO3 BLDV-SCNC: 34 MMOL/L (ref 21–28)
HCO3 SERPL-SCNC: 28 MMOL/L (ref 22–29)
HCT VFR BLD AUTO: 36.5 % (ref 35–47)
HGB BLD-MCNC: 12.3 G/DL (ref 11.7–15.7)
HGB UR QL STRIP: NEGATIVE
IMM GRANULOCYTES # BLD: 0.1 10E3/UL
IMM GRANULOCYTES NFR BLD: 1 %
INTERPRETATION ECG - MUSE: NORMAL
KETONES UR STRIP-MCNC: NEGATIVE MG/DL
LEUKOCYTE ESTERASE UR QL STRIP: NEGATIVE
LYMPHOCYTES # BLD AUTO: 1.7 10E3/UL (ref 0.8–5.3)
LYMPHOCYTES NFR BLD AUTO: 21 %
MAGNESIUM SERPL-MCNC: 3.2 MG/DL (ref 1.7–2.3)
MCH RBC QN AUTO: 31.6 PG (ref 26.5–33)
MCHC RBC AUTO-ENTMCNC: 33.7 G/DL (ref 31.5–36.5)
MCV RBC AUTO: 94 FL (ref 78–100)
MONOCYTES # BLD AUTO: 0.7 10E3/UL (ref 0–1.3)
MONOCYTES NFR BLD AUTO: 9 %
NEUTROPHILS # BLD AUTO: 5.5 10E3/UL (ref 1.6–8.3)
NEUTROPHILS NFR BLD AUTO: 68 %
NITRATE UR QL: NEGATIVE
NRBC # BLD AUTO: 0 10E3/UL
NRBC BLD AUTO-RTO: 0 /100
O2/TOTAL GAS SETTING VFR VENT: 21 %
OXYHGB MFR BLDV: 59 % (ref 70–75)
P AXIS - MUSE: 68 DEGREES
PCO2 BLDV: 52 MM HG (ref 40–50)
PH BLDV: 7.43 [PH] (ref 7.32–7.43)
PH UR STRIP: 6 [PH] (ref 5–7)
PLATELET # BLD AUTO: 699 10E3/UL (ref 150–450)
PO2 BLDV: 33 MM HG (ref 25–47)
POTASSIUM SERPL-SCNC: 4 MMOL/L (ref 3.4–5.3)
PR INTERVAL - MUSE: 142 MS
PROT SERPL-MCNC: 7 G/DL (ref 6.4–8.3)
QRS DURATION - MUSE: 66 MS
QT - MUSE: 354 MS
QTC - MUSE: 442 MS
R AXIS - MUSE: 40 DEGREES
RBC # BLD AUTO: 3.89 10E6/UL (ref 3.8–5.2)
RBC URINE: <1 /HPF
SAO2 % BLDV: 59.2 % (ref 70–75)
SODIUM SERPL-SCNC: 137 MMOL/L (ref 135–145)
SP GR UR STRIP: 1.01 (ref 1–1.03)
SQUAMOUS EPITHELIAL: 1 /HPF
SYSTOLIC BLOOD PRESSURE - MUSE: NORMAL MMHG
T AXIS - MUSE: 66 DEGREES
UROBILINOGEN UR STRIP-MCNC: NORMAL MG/DL
VENTRICULAR RATE- MUSE: 94 BPM
WBC # BLD AUTO: 8 10E3/UL (ref 4–11)
WBC URINE: 1 /HPF

## 2025-05-15 PROCEDURE — 250N000013 HC RX MED GY IP 250 OP 250 PS 637: Performed by: STUDENT IN AN ORGANIZED HEALTH CARE EDUCATION/TRAINING PROGRAM

## 2025-05-15 PROCEDURE — 96361 HYDRATE IV INFUSION ADD-ON: CPT | Performed by: STUDENT IN AN ORGANIZED HEALTH CARE EDUCATION/TRAINING PROGRAM

## 2025-05-15 PROCEDURE — 250N000012 HC RX MED GY IP 250 OP 636 PS 637

## 2025-05-15 PROCEDURE — 85025 COMPLETE CBC W/AUTO DIFF WBC: CPT | Performed by: STUDENT IN AN ORGANIZED HEALTH CARE EDUCATION/TRAINING PROGRAM

## 2025-05-15 PROCEDURE — 99285 EMERGENCY DEPT VISIT HI MDM: CPT | Performed by: STUDENT IN AN ORGANIZED HEALTH CARE EDUCATION/TRAINING PROGRAM

## 2025-05-15 PROCEDURE — 82962 GLUCOSE BLOOD TEST: CPT

## 2025-05-15 PROCEDURE — 82010 KETONE BODYS QUAN: CPT | Performed by: STUDENT IN AN ORGANIZED HEALTH CARE EDUCATION/TRAINING PROGRAM

## 2025-05-15 PROCEDURE — 250N000011 HC RX IP 250 OP 636: Mod: JZ | Performed by: STUDENT IN AN ORGANIZED HEALTH CARE EDUCATION/TRAINING PROGRAM

## 2025-05-15 PROCEDURE — 99222 1ST HOSP IP/OBS MODERATE 55: CPT | Mod: AI | Performed by: STUDENT IN AN ORGANIZED HEALTH CARE EDUCATION/TRAINING PROGRAM

## 2025-05-15 PROCEDURE — 82805 BLOOD GASES W/O2 SATURATION: CPT | Performed by: STUDENT IN AN ORGANIZED HEALTH CARE EDUCATION/TRAINING PROGRAM

## 2025-05-15 PROCEDURE — 80053 COMPREHEN METABOLIC PANEL: CPT | Performed by: STUDENT IN AN ORGANIZED HEALTH CARE EDUCATION/TRAINING PROGRAM

## 2025-05-15 PROCEDURE — 99207 PR APP CREDIT; MD BILLING SHARED VISIT: CPT | Mod: FS

## 2025-05-15 PROCEDURE — 120N000002 HC R&B MED SURG/OB UMMC

## 2025-05-15 PROCEDURE — 250N000013 HC RX MED GY IP 250 OP 250 PS 637

## 2025-05-15 PROCEDURE — 96375 TX/PRO/DX INJ NEW DRUG ADDON: CPT | Performed by: STUDENT IN AN ORGANIZED HEALTH CARE EDUCATION/TRAINING PROGRAM

## 2025-05-15 PROCEDURE — 81003 URINALYSIS AUTO W/O SCOPE: CPT | Performed by: STUDENT IN AN ORGANIZED HEALTH CARE EDUCATION/TRAINING PROGRAM

## 2025-05-15 PROCEDURE — 83735 ASSAY OF MAGNESIUM: CPT | Performed by: STUDENT IN AN ORGANIZED HEALTH CARE EDUCATION/TRAINING PROGRAM

## 2025-05-15 PROCEDURE — 258N000003 HC RX IP 258 OP 636: Performed by: STUDENT IN AN ORGANIZED HEALTH CARE EDUCATION/TRAINING PROGRAM

## 2025-05-15 PROCEDURE — 96374 THER/PROPH/DIAG INJ IV PUSH: CPT | Performed by: STUDENT IN AN ORGANIZED HEALTH CARE EDUCATION/TRAINING PROGRAM

## 2025-05-15 PROCEDURE — 250N000011 HC RX IP 250 OP 636: Mod: JZ

## 2025-05-15 PROCEDURE — 74176 CT ABD & PELVIS W/O CONTRAST: CPT

## 2025-05-15 PROCEDURE — 96376 TX/PRO/DX INJ SAME DRUG ADON: CPT | Performed by: STUDENT IN AN ORGANIZED HEALTH CARE EDUCATION/TRAINING PROGRAM

## 2025-05-15 PROCEDURE — 74176 CT ABD & PELVIS W/O CONTRAST: CPT | Mod: 26 | Performed by: RADIOLOGY

## 2025-05-15 PROCEDURE — 99207 PR NO BILLABLE SERVICE THIS VISIT: CPT

## 2025-05-15 PROCEDURE — S9342 HIT ENTERAL PUMP DIEM: HCPCS

## 2025-05-15 PROCEDURE — 36415 COLL VENOUS BLD VENIPUNCTURE: CPT | Performed by: STUDENT IN AN ORGANIZED HEALTH CARE EDUCATION/TRAINING PROGRAM

## 2025-05-15 PROCEDURE — 93010 ELECTROCARDIOGRAM REPORT: CPT | Performed by: STUDENT IN AN ORGANIZED HEALTH CARE EDUCATION/TRAINING PROGRAM

## 2025-05-15 RX ORDER — ONDANSETRON 4 MG/1
4 TABLET, ORALLY DISINTEGRATING ORAL EVERY 6 HOURS PRN
Status: DISCONTINUED | OUTPATIENT
Start: 2025-05-15 | End: 2025-05-24 | Stop reason: HOSPADM

## 2025-05-15 RX ORDER — NICOTINE POLACRILEX 4 MG
15-30 LOZENGE BUCCAL
Status: DISCONTINUED | OUTPATIENT
Start: 2025-05-15 | End: 2025-05-16

## 2025-05-15 RX ORDER — CYCLOBENZAPRINE HCL 5 MG
5 TABLET ORAL
Status: DISCONTINUED | OUTPATIENT
Start: 2025-05-15 | End: 2025-05-16

## 2025-05-15 RX ORDER — FAMOTIDINE 20 MG/1
20 TABLET, FILM COATED ORAL AT BEDTIME
Status: DISCONTINUED | OUTPATIENT
Start: 2025-05-15 | End: 2025-05-16

## 2025-05-15 RX ORDER — OXYCODONE HYDROCHLORIDE 5 MG/1
5-10 TABLET ORAL EVERY 4 HOURS PRN
Status: DISCONTINUED | OUTPATIENT
Start: 2025-05-15 | End: 2025-05-17

## 2025-05-15 RX ORDER — METOCLOPRAMIDE 10 MG/1
10 TABLET ORAL 3 TIMES DAILY
Status: DISCONTINUED | OUTPATIENT
Start: 2025-05-15 | End: 2025-05-24 | Stop reason: HOSPADM

## 2025-05-15 RX ORDER — TOPIRAMATE 50 MG/1
100 TABLET, FILM COATED ORAL 2 TIMES DAILY
Status: DISCONTINUED | OUTPATIENT
Start: 2025-05-15 | End: 2025-05-16

## 2025-05-15 RX ORDER — ONDANSETRON 2 MG/ML
4 INJECTION INTRAMUSCULAR; INTRAVENOUS EVERY 30 MIN PRN
Status: DISCONTINUED | OUTPATIENT
Start: 2025-05-15 | End: 2025-05-15

## 2025-05-15 RX ORDER — TRAZODONE HYDROCHLORIDE 100 MG/1
200 TABLET ORAL EVERY EVENING
Status: DISCONTINUED | OUTPATIENT
Start: 2025-05-15 | End: 2025-05-16

## 2025-05-15 RX ORDER — GABAPENTIN 400 MG/1
400 CAPSULE ORAL 3 TIMES DAILY
Status: DISCONTINUED | OUTPATIENT
Start: 2025-05-15 | End: 2025-05-24 | Stop reason: HOSPADM

## 2025-05-15 RX ORDER — CALCIUM CARBONATE 500 MG/1
1000 TABLET, CHEWABLE ORAL 4 TIMES DAILY PRN
Status: DISCONTINUED | OUTPATIENT
Start: 2025-05-15 | End: 2025-05-24 | Stop reason: HOSPADM

## 2025-05-15 RX ORDER — ONDANSETRON 4 MG/1
4 TABLET, FILM COATED ORAL EVERY 8 HOURS PRN
Status: DISCONTINUED | OUTPATIENT
Start: 2025-05-15 | End: 2025-05-15

## 2025-05-15 RX ORDER — SUCRALFATE 1 G/1
1 TABLET ORAL
Status: DISCONTINUED | OUTPATIENT
Start: 2025-05-15 | End: 2025-05-24 | Stop reason: HOSPADM

## 2025-05-15 RX ORDER — LIDOCAINE 40 MG/G
CREAM TOPICAL
Status: DISCONTINUED | OUTPATIENT
Start: 2025-05-15 | End: 2025-05-24 | Stop reason: HOSPADM

## 2025-05-15 RX ORDER — CYCLOBENZAPRINE HCL 5 MG
5 TABLET ORAL 3 TIMES DAILY PRN
Status: DISCONTINUED | OUTPATIENT
Start: 2025-05-15 | End: 2025-05-16

## 2025-05-15 RX ORDER — FUROSEMIDE 20 MG/1
40 TABLET ORAL
Status: DISCONTINUED | OUTPATIENT
Start: 2025-05-15 | End: 2025-05-16

## 2025-05-15 RX ORDER — GABAPENTIN 300 MG/1
300 CAPSULE ORAL ONCE
Status: COMPLETED | OUTPATIENT
Start: 2025-05-15 | End: 2025-05-15

## 2025-05-15 RX ORDER — NALOXONE HYDROCHLORIDE 0.4 MG/ML
0.2 INJECTION, SOLUTION INTRAMUSCULAR; INTRAVENOUS; SUBCUTANEOUS
Status: DISCONTINUED | OUTPATIENT
Start: 2025-05-15 | End: 2025-05-24 | Stop reason: HOSPADM

## 2025-05-15 RX ORDER — ACETAMINOPHEN 325 MG/1
650 TABLET ORAL 3 TIMES DAILY
Status: DISCONTINUED | OUTPATIENT
Start: 2025-05-15 | End: 2025-05-16

## 2025-05-15 RX ORDER — PANTOPRAZOLE SODIUM 40 MG/1
40 TABLET, DELAYED RELEASE ORAL 2 TIMES DAILY
Status: DISCONTINUED | OUTPATIENT
Start: 2025-05-15 | End: 2025-05-24 | Stop reason: HOSPADM

## 2025-05-15 RX ORDER — HYDROMORPHONE HCL IN WATER/PF 6 MG/30 ML
0.4 PATIENT CONTROLLED ANALGESIA SYRINGE INTRAVENOUS EVERY 4 HOURS PRN
Status: DISCONTINUED | OUTPATIENT
Start: 2025-05-15 | End: 2025-05-17

## 2025-05-15 RX ORDER — ONDANSETRON 2 MG/ML
4 INJECTION INTRAMUSCULAR; INTRAVENOUS EVERY 6 HOURS PRN
Status: DISCONTINUED | OUTPATIENT
Start: 2025-05-15 | End: 2025-05-24 | Stop reason: HOSPADM

## 2025-05-15 RX ORDER — DEXTROSE MONOHYDRATE 25 G/50ML
25-50 INJECTION, SOLUTION INTRAVENOUS
Status: DISCONTINUED | OUTPATIENT
Start: 2025-05-15 | End: 2025-05-16

## 2025-05-15 RX ORDER — NALOXONE HYDROCHLORIDE 0.4 MG/ML
0.4 INJECTION, SOLUTION INTRAMUSCULAR; INTRAVENOUS; SUBCUTANEOUS
Status: DISCONTINUED | OUTPATIENT
Start: 2025-05-15 | End: 2025-05-24 | Stop reason: HOSPADM

## 2025-05-15 RX ORDER — POTASSIUM CHLORIDE 1500 MG/1
20 TABLET, EXTENDED RELEASE ORAL DAILY
Status: DISCONTINUED | OUTPATIENT
Start: 2025-05-16 | End: 2025-05-24 | Stop reason: HOSPADM

## 2025-05-15 RX ORDER — HYDROCORTISONE 10 MG/1
10 TABLET ORAL EVERY MORNING
Status: DISCONTINUED | OUTPATIENT
Start: 2025-05-16 | End: 2025-05-24 | Stop reason: HOSPADM

## 2025-05-15 RX ORDER — AMOXICILLIN 250 MG
2 CAPSULE ORAL 2 TIMES DAILY PRN
Status: DISCONTINUED | OUTPATIENT
Start: 2025-05-15 | End: 2025-05-16

## 2025-05-15 RX ORDER — ALENDRONATE SODIUM 70 MG/1
70 TABLET ORAL
Status: DISCONTINUED | OUTPATIENT
Start: 2025-05-18 | End: 2025-05-15

## 2025-05-15 RX ORDER — LEVOTHYROXINE SODIUM 125 UG/1
125 TABLET ORAL
Status: DISCONTINUED | OUTPATIENT
Start: 2025-05-16 | End: 2025-05-24 | Stop reason: HOSPADM

## 2025-05-15 RX ORDER — MENTHOL AND METHYL SALICYLATE 7.6; 29 G/100G; G/100G
OINTMENT TOPICAL EVERY 6 HOURS PRN
Status: DISCONTINUED | OUTPATIENT
Start: 2025-05-15 | End: 2025-05-16

## 2025-05-15 RX ORDER — ACETAMINOPHEN 500 MG
1000 TABLET ORAL ONCE
Status: COMPLETED | OUTPATIENT
Start: 2025-05-15 | End: 2025-05-15

## 2025-05-15 RX ORDER — POLYETHYLENE GLYCOL 3350 17 G/17G
17 POWDER, FOR SOLUTION ORAL 2 TIMES DAILY
Status: DISCONTINUED | OUTPATIENT
Start: 2025-05-15 | End: 2025-05-16

## 2025-05-15 RX ORDER — ACETAMINOPHEN 650 MG/1
650 SUPPOSITORY RECTAL 3 TIMES DAILY
Status: DISCONTINUED | OUTPATIENT
Start: 2025-05-15 | End: 2025-05-16

## 2025-05-15 RX ORDER — MORPHINE SULFATE 4 MG/ML
4 INJECTION, SOLUTION INTRAMUSCULAR; INTRAVENOUS
Refills: 0 | Status: DISCONTINUED | OUTPATIENT
Start: 2025-05-15 | End: 2025-05-15

## 2025-05-15 RX ORDER — NUTRITIONAL SUPPLEMENT/FIBER 0.05 G-1.5
1080 LIQUID (ML) ORAL CONTINUOUS
Status: DISCONTINUED | OUTPATIENT
Start: 2025-05-15 | End: 2025-05-15

## 2025-05-15 RX ORDER — AMOXICILLIN 250 MG
1 CAPSULE ORAL 2 TIMES DAILY PRN
Status: DISCONTINUED | OUTPATIENT
Start: 2025-05-15 | End: 2025-05-16

## 2025-05-15 RX ADMIN — FUROSEMIDE 40 MG: 40 TABLET ORAL at 20:07

## 2025-05-15 RX ADMIN — HYDROMORPHONE HYDROCHLORIDE 0.4 MG: 0.2 INJECTION, SOLUTION INTRAMUSCULAR; INTRAVENOUS; SUBCUTANEOUS at 20:11

## 2025-05-15 RX ADMIN — TRAZODONE HYDROCHLORIDE 200 MG: 100 TABLET ORAL at 20:07

## 2025-05-15 RX ADMIN — INSULIN GLARGINE 18 UNITS: 100 INJECTION, SOLUTION SUBCUTANEOUS at 20:17

## 2025-05-15 RX ADMIN — GABAPENTIN 300 MG: 300 CAPSULE ORAL at 10:51

## 2025-05-15 RX ADMIN — INSULIN ASPART 3 UNITS: 100 INJECTION, SOLUTION INTRAVENOUS; SUBCUTANEOUS at 23:07

## 2025-05-15 RX ADMIN — MORPHINE SULFATE 4 MG: 4 INJECTION INTRAVENOUS at 12:37

## 2025-05-15 RX ADMIN — ONDANSETRON 4 MG: 2 INJECTION, SOLUTION INTRAMUSCULAR; INTRAVENOUS at 11:43

## 2025-05-15 RX ADMIN — HYDROCORTISONE 15 MG: 10 TABLET ORAL at 20:08

## 2025-05-15 RX ADMIN — ONDANSETRON 4 MG: 4 TABLET, ORALLY DISINTEGRATING ORAL at 20:07

## 2025-05-15 RX ADMIN — POLYETHYLENE GLYCOL 3350 17 G: 17 POWDER, FOR SOLUTION ORAL at 20:06

## 2025-05-15 RX ADMIN — PANTOPRAZOLE SODIUM 40 MG: 40 TABLET, DELAYED RELEASE ORAL at 20:07

## 2025-05-15 RX ADMIN — GABAPENTIN 400 MG: 400 CAPSULE ORAL at 20:07

## 2025-05-15 RX ADMIN — INSULIN ASPART 6 UNITS: 100 INJECTION, SOLUTION INTRAVENOUS; SUBCUTANEOUS at 20:16

## 2025-05-15 RX ADMIN — SODIUM CHLORIDE, SODIUM LACTATE, POTASSIUM CHLORIDE, AND CALCIUM CHLORIDE 1000 ML: .6; .31; .03; .02 INJECTION, SOLUTION INTRAVENOUS at 11:06

## 2025-05-15 RX ADMIN — CYCLOBENZAPRINE HYDROCHLORIDE 7.5 MG: 5 TABLET, FILM COATED ORAL at 11:34

## 2025-05-15 RX ADMIN — ACETAMINOPHEN 650 MG: 325 TABLET ORAL at 20:07

## 2025-05-15 RX ADMIN — MORPHINE SULFATE 4 MG: 4 INJECTION INTRAVENOUS at 11:04

## 2025-05-15 RX ADMIN — FAMOTIDINE 20 MG: 20 TABLET, FILM COATED ORAL at 22:05

## 2025-05-15 RX ADMIN — SODIUM CHLORIDE, SODIUM LACTATE, POTASSIUM CHLORIDE, AND CALCIUM CHLORIDE 1000 ML: .6; .31; .03; .02 INJECTION, SOLUTION INTRAVENOUS at 12:33

## 2025-05-15 RX ADMIN — OXYCODONE 10 MG: 5 TABLET ORAL at 22:05

## 2025-05-15 RX ADMIN — SUCRALFATE 1 G: 1 TABLET ORAL at 20:07

## 2025-05-15 RX ADMIN — TOPIRAMATE 100 MG: 50 TABLET, FILM COATED ORAL at 20:07

## 2025-05-15 RX ADMIN — SUCRALFATE 1 G: 1 TABLET ORAL at 23:07

## 2025-05-15 RX ADMIN — ACETAMINOPHEN 1000 MG: 500 TABLET ORAL at 10:51

## 2025-05-15 ASSESSMENT — ACTIVITIES OF DAILY LIVING (ADL)
ADLS_ACUITY_SCORE: 63
ADLS_ACUITY_SCORE: 62
ADLS_ACUITY_SCORE: 63
ADLS_ACUITY_SCORE: 62
ADLS_ACUITY_SCORE: 58
ADLS_ACUITY_SCORE: 62
ADLS_ACUITY_SCORE: 63
ADLS_ACUITY_SCORE: 62
ADLS_ACUITY_SCORE: 63

## 2025-05-15 ASSESSMENT — COLUMBIA-SUICIDE SEVERITY RATING SCALE - C-SSRS
1. IN THE PAST MONTH, HAVE YOU WISHED YOU WERE DEAD OR WISHED YOU COULD GO TO SLEEP AND NOT WAKE UP?: NO
6. HAVE YOU EVER DONE ANYTHING, STARTED TO DO ANYTHING, OR PREPARED TO DO ANYTHING TO END YOUR LIFE?: NO
2. HAVE YOU ACTUALLY HAD ANY THOUGHTS OF KILLING YOURSELF IN THE PAST MONTH?: NO

## 2025-05-15 NOTE — ED PROVIDER NOTES
ED Provider Note  [facility]      History     Chief Complaint   Patient presents with    Abnormal Labs     HPI  Chantell Kidd is a 59 year old female with a history of chronic pancreatitis s/p total pancreatectomy with islet autotransplantation (2012), post TPAIT type I diabetes, s/p PEG-J tube replacement (5/2/2025) and adrenal insufficiency who presents to  ED for abdominal and back pain.    Per chart review, patient was admitted to [facility] from 4/30 - 5/6/2025 due to abdominal pain, lower extremity edema and hyperglycemia of greater than 700.  Patient was found to have leakage of fluid around site of the PEG tube along with surrounding erythema.  CT A/P on 4/30 showed no acute intra-abdominal pathology with PEG-J coiled in the stomach, along with large colonic stool burden.  PEG-J-tube replaced by GI on 5/2.  Patient underwent lower extremity ultrasound which was negative for DVT.  Patient was instructed to continue Lasix 40 mg as prescribed by her provider in [place].  Patient was also instructed to continue PTA hydrocortisone 10 mg in the a.m. and 15 mg in the p.m.    Patient endorses persistent worsening of her chronic abdominal pain over the last month.  Has been having difficulties with tolerating her feeds, but has been able to get through them.  Notes that she is urinating more than normal, but has been having some increasing swelling in her legs again.  Also endorses that the pain she has in her abdomen radiates into her back.  She had lab work done with her primary care doctor on 5/13, and this demonstrates a significant elevation of her creatinine from her baseline of 0.37 up to 3.05 at the time.  Also noted to be hyperglycemic and was recommended to come into the ED for evaluation.    Does endorse a fever earlier this week, and has been taking Tylenol since then so she is unsure whether or not she has had any further fevers.  Does note some dysuria, and feeling like her abdomen  is more distended than normal.  She is having bowel movements with her last one yesterday    Past Medical History  Past Medical History:   Diagnosis Date    Chronic abdominal pain     Chronic pancreatitis (H)     S/P pancreatectomy    Depression with anxiety     Gastro-oesophageal reflux disease     Gastroparesis 05/13/2025    Hypothyroidism 04/23/2015    Kidney stones     Low serum cortisol level     Migraines     Other chronic pain     STOMACH    Other chronic pain     LUMBAR SPINE    Peripheral neuropathy     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    TPIAT    Spasm of sphincter of Oddi      Past Surgical History:   Procedure Laterality Date    ARTHROPLASTY CARPOMETACARPAL (THUMB JOINT)  05/02/2014    Procedure: ARTHROPLASTY CARPOMETACARPAL (THUMB JOINT);  Surgeon: Carina Panda MD;  Location: [facility]    CHOLECYSTECTOMY  01/01/2004    COLONOSCOPY  07/18/2014    Procedure: COLONOSCOPY;  Surgeon: Aurora Sahu MD;  [facility]: [facility]    COLONOSCOPY N/A 08/01/2017    Procedure: COLONOSCOPY;  Colonoscopy and upper endoscopy;  Surgeon: Deirdre Harris MD;  [facility]    ENDOSCOPIC INSERTION TUBE JEJUNOSTOMY N/A 5/2/2025    Procedure: Esophagogastroduodenoscopy, Jejunopexy, JEJUNOSTOMY TUBE PLACEMENT, GASTROSTOMY TUBE EXCHANGE;  Surgeon: Jose Francisco Perdomo MD;  [facility]    ENDOSCOPIC RETROGRADE CHOLANGIOPANCREATOGRAM      ENDOSCOPIC RETROGRADE CHOLANGIOPANCREATOGRAM  04/19/2011    Procedure:ENDOSCOPIC RETROGRADE CHOLANGIOPANCREATOGRAM; Pancreatic Stent Placement      ENDOSCOPIC RETROGRADE CHOLANGIOPANCREATOGRAM  05/26/2011    Procedure:ENDOSCOPIC RETROGRADE CHOLANGIOPANCREATOGRAM; with Pancreatic Stent Removal; Surgeon:DALE MIMS; [facility]    ENDOSCOPY UPPER, COLONOSCOPY, COMBINED  04/25/2012    Procedure:COMBINED ENDOSCOPY UPPER, COLONOSCOPY; Enteroscopy with Bile Duct Stent Removal, Colonoscopy  *[facility] Room*; Surgeon:GRACY GODWIN;  [facility]    ESOPHAGOSCOPY, GASTROSCOPY, DUODENOSCOPY (EGD), COMBINED  05/26/2011    Procedure:COMBINED ESOPHAGOSCOPY, GASTROSCOPY, DUODENOSCOPY (EGD); Surgeon:DALE MIMS; [facility]    ESOPHAGOSCOPY, GASTROSCOPY, DUODENOSCOPY (EGD), COMBINED N/A 10/30/2014    Procedure: COMBINED ESOPHAGOSCOPY, GASTROSCOPY, DUODENOSCOPY (EGD), BIOPSY SINGLE OR MULTIPLE;  Surgeon: Sarai Moon MD;  [facility]    ESOPHAGOSCOPY, GASTROSCOPY, DUODENOSCOPY (EGD), COMBINED Left 07/06/2015    Procedure: COMBINED ESOPHAGOSCOPY, GASTROSCOPY, DUODENOSCOPY (EGD), BIOPSY SINGLE OR MULTIPLE;  Surgeon: Thomas Estrada MD;  Location:  GI    ESOPHAGOSCOPY, GASTROSCOPY, DUODENOSCOPY (EGD), COMBINED N/A 07/08/2016    Procedure: COMBINED ESOPHAGOSCOPY, GASTROSCOPY, DUODENOSCOPY (EGD), BIOPSY SINGLE OR MULTIPLE;  Surgeon: Eloy Klein MD;  Location:  GI    ESOPHAGOSCOPY, GASTROSCOPY, DUODENOSCOPY (EGD), COMBINED N/A 08/04/2016    Procedure: COMBINED ESOPHAGOSCOPY, GASTROSCOPY, DUODENOSCOPY (EGD), BIOPSY SINGLE OR MULTIPLE;  Surgeon: Jason Brown MD;  Location:  GI    ESOPHAGOSCOPY, GASTROSCOPY, DUODENOSCOPY (EGD), COMBINED N/A 08/01/2017    Procedure: COMBINED ESOPHAGOSCOPY, GASTROSCOPY, DUODENOSCOPY (EGD);;  Surgeon: Deirdre Harris MD;  Location:  GI    ESOPHAGOSCOPY, GASTROSCOPY, DUODENOSCOPY (EGD), COMBINED N/A 06/12/2019    Procedure: ESOPHAGOGASTRODUODENOSCOPY (EGD);  Surgeon: Jose Francisco Perdomo MD;  Location:  GI    GYN SURGERY      Hysterectomy and USO    [facility] ENDOSCOPY W EUS  07/20/2011    Procedure:COMBINED ENDOSCOPIC ULTRASOUND, ESOPHAGOSCOPY, GASTROSCOPY, DUODENOSCOPY (EGD); Surgeon:DARVIN DONOHUE; Location: GI    HERNIORRHAPHY VENTRAL N/A 09/15/2016    Procedure: HERNIORRHAPHY VENTRAL;  Surgeon: Juanita Bernabe MD;  [facility]    HYSTERECTOMY  1997 or 1998    USO    INCISION AND DRAINAGE ABDOMEN WASHOUT, COMBINED   08/16/2012    Procedure: COMBINED INCISION AND DRAINAGE ABDOMEN WASHOUT;  ,debridement and Drainage Post Appendectomy;  Surgeon: Ron Austin MD;  [facility]    INJECT TRANSVERSUS ABDOMINIS PLANE (TAP) BLOCK BILATERAL Bilateral 05/26/2016    Procedure: INJECT TRANSVERSUS ABDOMINIS PLANE (TAP) BLOCK BILATERAL;  Surgeon: Leonard Mccallum MD;  [facility]    IR NG TUBE PLACEMENT REQ RAD & FLUORO  12/04/2017    LAPAROSCOPIC APPENDECTOMY  07/30/2012    Procedure: LAPAROSCOPIC APPENDECTOMY;  Open Appendectomy;  Surgeon: Ron Austin MD;  [facility]    PANCREATECTOMY, TRANSPLANT AUTO ISLET CELL, COMBINED  01/06/2012    Procedure:COMBINED PANCREATECTOMY, TRANSPLANT AUTO ISLET CELL; Total  Pancreatectomy, Auto Islet Transplant, splenectomy, 18fr. transgastric-jejunal feeding tube placement, liver biopsy; Surgeon:PALAK LEE; [facility]    PICC DOUBLE LUMEN PLACEMENT Right 04/19/2025    5FR DL PICC, brachial medial vein. L-38cm, 3cm out.    PICC INSERTION - DOUBLE LUMEN Right 04/30/2025    39-3cm, Medial brachial vein    REPLACE GASTROSTOMY TUBE, PERCUTANEOUS N/A 08/30/2017    Procedure: REPLACE GASTROSTOMY TUBE, PERCUTANEOUS;  GJ Tube Change;  Surgeon: Jose Nath PA-C;  [facility]    SPLENECTOMY       acetaminophen (TYLENOL) 325 MG tablet  Acetone, Urine, Test (KETONE TEST) STRP  Alcohol Swabs PADS  alendronate (FOSAMAX) 70 MG tablet  alum & mag hydroxide-simethicone (MYLANTA/MAALOX) 200-200-25 MG CHEW chewable tablet  Continuous Glucose  (DEXCOM G7 ) RICARDO  Continuous Glucose Sensor (DEXCOM G7 SENSOR) MISC  CONTOUR NEXT TEST test strip  cyclobenzaprine (FLEXERIL) 5 MG tablet  diclofenac (FLECTOR) 1.3 % Patch  diclofenac (VOLTAREN) 1 % GEL  Digestive Enzyme Cartridge (RELIZORB) Device  dronabinol (MARINOL) 2.5 MG capsule  DULoxetine (CYMBALTA) 30 MG capsule  DULoxetine (CYMBALTA) 60 MG EC capsule  famotidine (PEPCID) 20 MG tablet  furosemide (LASIX)  40 MG tablet  gabapentin (NEURONTIN) 400 MG capsule  Glucagon (GVOKE HYPOPEN 2-PACK) 1 MG/0.2ML pen  hydrocortisone (CORTEF) 10 MG tablet  hydrocortisone (CORTEF) 5 MG tablet  hydrocortisone sodium succinate PF (SOLU-CORTEF) 100 MG injection  Injection Device for insulin (NOVOPEN ECHO) RICARDO  insulin aspart (NOVOLOG PEN) 100 UNIT/ML pen  Insulin Disposable Pump (OMNIPOD 5 G6 INTRO, GEN 5,) KIT  insulin glargine (LANTUS PEN) 100 UNIT/ML pen  insulin  UNIT/ML vial  insulin pen needle (PIP PEN NEEDLES 32G X 4MM) 32G X 4 MM miscellaneous  [place] 1.5 Peptide Plain 325 mL  levothyroxine (SYNTHROID/LEVOTHROID) 125 MCG tablet  linaclotide (LINZESS) 145 MCG capsule  lipase-protease-amylase (CREON 12) 05419-60[address]-48442 units CPEP  metoclopramide (REGLAN) 5 MG tablet  Naloxone HCl 8 MG/0.1ML LIQD  Nutritional Supplements (BOOST HIGH PROTEIN) LIQD  ondansetron (ZOFRAN) 4 MG tablet  order for DME  oxyCODONE (ROXICODONE) 5 MG tablet  pantoprazole (PROTONIX) 40 MG EC tablet  polyethylene glycol (MIRALAX/GLYCOLAX) packet  potassium chloride ER (KLOR-CON M) 20 MEQ CR tablet  senna-docusate (SENOKOT-S/PERICOLACE) 8.6-50 MG tablet  [place]arps Container MISC  sodium chloride (OCEAN) 0.65 % nasal spray  sodium chloride, PF, 0.9% PF flush  sucralfate (CARAFATE) 1 GM tablet  SUMAtriptan (IMITREX) 50 MG tablet  thiamine (B-1) 100 MG tablet  topiramate (TOPAMAX) 100 MG tablet  traZODone (DESYREL) 100 MG tablet      Allergies   Allergen Reactions    Corticosteroids Other (See Comments)     All oral, IV and injectable steroids are contraindicated (unless in life threatening situations) in Islet Auto transplant recipients. They can cause irreversible loss of islet cell function. Please contact patient's transplant care coordinator YURI Cortez RN at [phone]/pager [phone] and/or endocrinologist prior to administration.      Chocolate Flavoring Agent (Non-Screening) Rash     Breaks out when eats chocolate     Family  History  Family History   Problem Relation Age of Onset    Hypertension Mother     Diabetes Mother     Osteoporosis Mother     Cancer Father         pancreatic cancer    Diabetes Maternal Grandmother     Cardiovascular Maternal Grandmother     Cancer Maternal Grandfather         lung cancer    Cancer Sister         brain    Cancer Sister         liver cancer     Social History   Social History     Tobacco Use    Smoking status: Never    Smokeless tobacco: Never   Vaping Use    Vaping status: Never Used   Substance Use Topics    Alcohol use: No     Alcohol/week: 0.0 standard drinks of alcohol    Drug use: No      Past medical history, past surgical history, medications, allergies, family history, and social history were reviewed with the patient. No additional pertinent items.   A medically appropriate review of systems was performed with pertinent positives and negatives noted in the HPI, and all other systems negative.    Physical Exam   BP: 117/73  Pulse: 103  Temp: 99  F (37.2  C)  Resp: 16  SpO2: 96 %  Physical Exam  GEN: Thin, nontoxic appearing  HEENT: normocephalic and atraumatic, PERRLA, EOMI  CV: well-perfused, normal skin color for ethnicity, sinus tachycardia, no murmurs rubs or gallops  PULM: breathing comfortably, in no respiratory distress, clear to auscultation upper and lower lung fields  ABD: Mildly distended in the lower abdomen, tenderness to palpation especially in the left lower quadrant, G-tube and GJ tube in place without any surrounding erythema, drainage  Back: Bilateral CVA tenderness  EXT: Full range of motion.  No edema.  NEURO: awake, conversant, grossly normal bilateral upper and lower extremity strength & ROM   SKIN: No rashes, ecchymosis, or lacerations  PSYCH: Calm and cooperative, interactive      ED Course, Procedures, & Data      Procedures            EKG Interpretation:      Interpreted by Krysta Moran MD  Time reviewed: 1035  Symptoms at time of EKG: nausea   Rhythm: normal  sinus   Rate: normal  Axis: normal  Ectopy: none  Conduction: normal  ST Segments/ T Waves: No ST-T wave changes  Q Waves: none  Comparison to prior: Unchanged    Clinical Impression: baseline, nonspecific T wave abnormalities     Results for orders placed or performed during the hospital encounter of 05/15/25   CT Abdomen Pelvis w/o Contrast     Status: None    Narrative    EXAMINATION: CT ABDOMEN PELVIS W/O CONTRAST, 5/15/2025 11:22 AM    TECHNIQUE:  Helical CT images from the lung bases through the pubic  symphysis were obtained  without IV contrast.     COMPARISON: 4/30/2025    HISTORY: lower abdominal pain, LLQ>LUQ, new acute kidney injury    FINDINGS:    Abdomen and pelvis: Status post splenectomy, total pancreatectomy,  Venessa-en-Y with choledochojejunostomy and gastrojejunostomy,  hysterectomy, and appendectomy. G-tube in place. Left lower quadrant  J-tube in place. Mild pneumobilia, similar to prior. No hepatic  masses. Hypoattenuating kidneys, otherwise unremarkable. Similar  perianastomotic dilation in the right lower quadrant. No significant  lingular loops of bowel. Large stool burden. Unremarkable urinary  bladder. No free air or fluid in the abdomen.    Lung bases:  Unremarkable    Bones and soft tissues: No acute or suspicious lesions. Diffuse  osteopenia.      Impression    IMPRESSION:   1. Hypoattenuating kidneys, most likely secondary to medical renal  disease.  2. Otherwise no acute findings in the abdomen and pelvis in this  limited noncontrast CT examination.  3. Extensive surgical changes. G-tube and J-tube are in place.    I have personally reviewed the examination and initial interpretation  and I agree with the findings.    GUICHO WATSON MD         SYSTEM ID:  [id number]   Comprehensive metabolic panel     Status: Abnormal   Result Value Ref Range    Sodium 137 135 - 145 mmol/L    Potassium 4.0 3.4 - 5.3 mmol/L    Carbon Dioxide (CO2) 28 22 - 29 mmol/L    Anion Gap 15 7 - 15 mmol/L     Urea Nitrogen 3.6 (L) 8.0 - 23.0 mg/dL    Creatinine 0.41 (L) 0.51 - 0.95 mg/dL    GFR Estimate >90 >60 mL/min/1.73m2    Calcium 9.3 8.8 - 10.4 mg/dL    Chloride 94 (L) 98 - 107 mmol/L    Glucose 571 (HH) 70 - 99 mg/dL    Alkaline Phosphatase 152 (H) 40 - 150 U/L     (H) 0 - 45 U/L    ALT 89 (H) 0 - 50 U/L    Protein Total 7.0 6.4 - 8.3 g/dL    Albumin 4.0 3.5 - 5.2 g/dL    Bilirubin Total 0.3 <=1.2 mg/dL   Magnesium     Status: Abnormal   Result Value Ref Range    Magnesium 3.2 (H) 1.7 - 2.3 mg/dL   Blood gas venous     Status: Abnormal   Result Value Ref Range    pH Venous 7.43 7.32 - 7.43    pCO2 Venous 52 (H) 40 - 50 mm Hg    pO2 Venous 33 25 - 47 mm Hg    Bicarbonate Venous 34 (H) 21 - 28 mmol/L    Base Excess/Deficit Venous 8.3 (H) -3.0 - 3.0 mmol/L    FIO2 21     Oxyhemoglobin Venous 59 (L) 70 - 75 %    O2 Sat, Venous 59.2 (L) 70.0 - 75.0 %    Narrative    In healthy individuals, oxyhemoglobin (O2Hb) and oxygen saturation (SO2) are approximately equal. In the presence of dyshemoglobins, oxyhemoglobin can be considerably lower than oxygen saturation.   Ketone Beta-Hydroxybutyrate Quantitative,Rapid     Status: Normal   Result Value Ref Range    Ketone (Beta-Hydroxybutyrate) Quantitative <0.18 <=0.30 mmol/L   UA with Microscopic reflex to Culture     Status: Abnormal    Specimen: Urine, Clean Catch   Result Value Ref Range    Color Urine Light Yellow Colorless, Straw, Light Yellow, Yellow    Appearance Urine Clear Clear    Glucose Urine >=1000 (A) Negative mg/dL    Bilirubin Urine Negative Negative    Ketones Urine Negative Negative mg/dL    Specific Gravity Urine 1.010 1.003 - 1.035    Blood Urine Negative Negative    pH Urine 6.0 5.0 - 7.0    Protein Albumin Urine Negative Negative mg/dL    Urobilinogen Urine Normal Normal mg/dL    Nitrite Urine Negative Negative    Leukocyte Esterase Urine Negative Negative    RBC Urine <1 <=2 /HPF    WBC Urine 1 <=5 /HPF    Squamous Epithelials Urine 1 <=1 /HPF     Narrative    Urine Culture not indicated   CBC with platelets and differential     Status: Abnormal   Result Value Ref Range    WBC Count 8.0 4.0 - 11.0 10e3/uL    RBC Count 3.89 3.80 - 5.20 10e6/uL    Hemoglobin 12.3 11.7 - 15.7 g/dL    Hematocrit 36.5 35.0 - 47.0 %    MCV 94 78 - 100 fL    MCH 31.6 26.5 - 33.0 pg    MCHC 33.7 31.5 - 36.5 g/dL    RDW 12.6 10.0 - 15.0 %    Platelet Count 699 (H) 150 - 450 10e3/uL    % Neutrophils 68 %    % Lymphocytes 21 %    % Monocytes 9 %    % Eosinophils 1 %    % Basophils 1 %    % Immature Granulocytes 1 %    NRBCs per 100 WBC 0 <1 /100    Absolute Neutrophils 5.5 1.6 - 8.3 10e3/uL    Absolute Lymphocytes 1.7 0.8 - 5.3 10e3/uL    Absolute Monocytes 0.7 0.0 - 1.3 10e3/uL    Absolute Eosinophils 0.1 0.0 - 0.7 10e3/uL    Absolute Basophils 0.1 0.0 - 0.2 10e3/uL    Absolute Immature Granulocytes 0.1 <=0.4 10e3/uL    Absolute NRBCs 0.0 10e3/uL   Glucose by meter     Status: Abnormal   Result Value Ref Range    GLUCOSE BY METER POCT 354 (H) 70 - 99 mg/dL   EKG 12-lead, tracing only     Status: None   Result Value Ref Range    Systolic Blood Pressure  mmHg    Diastolic Blood Pressure  mmHg    Ventricular Rate 94 BPM    Atrial Rate 94 BPM    ID Interval 142 ms    QRS Duration 66 ms     ms    QTc 442 ms    P Axis 68 degrees    R AXIS 40 degrees    T Axis 66 degrees    Interpretation ECG       Unconfirmed report - interpretation of this ECG is computer generated - see medical record for final interpretation  Sinus rhythm  Nonspecific T wave abnormality  Abnormal ECG    Confirmed by - EMERGENCY ROOM, PHYSICIAN ([id number]),  Margot Lopez ([id number]) on 5/15/2025 3:41:13 PM     CBC with platelets differential     Status: Abnormal    Narrative    The following orders were created for panel order CBC with platelets differential.  Procedure                               Abnormality         Status                     ---------                               -----------         ------                      CBC with platelets and ...[[id number]]  Abnormal            Final result                 Please view results for these tests on the individual orders.     Medications   morphine (PF) injection 4 mg (4 mg Intravenous $Given 5/15/25 1237)   ondansetron (ZOFRAN) injection 4 mg (4 mg Intravenous $Given 5/15/25 1143)   lactated ringers BOLUS 1,000 mL (0 mLs Intravenous Stopped 5/15/25 1205)   acetaminophen (TYLENOL) tablet 1,000 mg (1,000 mg Oral $Given 5/15/25 1051)   cyclobenzaprine (FLEXERIL) half-tab 7.5 mg (7.5 mg Oral $Given 5/15/25 1134)   gabapentin (NEURONTIN) capsule 300 mg (300 mg Oral $Given 5/15/25 1051)   lactated ringers BOLUS 1,000 mL (1,000 mLs Intravenous $New Bag 5/15/25 1233)     Labs Ordered and Resulted from Time of ED Arrival to Time of ED Departure   COMPREHENSIVE METABOLIC PANEL - Abnormal       Result Value    Sodium 137      Potassium 4.0      Carbon Dioxide (CO2) 28      Anion Gap 15      Urea Nitrogen 3.6 (*)     Creatinine 0.41 (*)     GFR Estimate >90      Calcium 9.3      Chloride 94 (*)     Glucose 571 (*)     Alkaline Phosphatase 152 (*)      (*)     ALT 89 (*)     Protein Total 7.0      Albumin 4.0      Bilirubin Total 0.3     MAGNESIUM - Abnormal    Magnesium 3.2 (*)    BLOOD GAS VENOUS - Abnormal    pH Venous 7.43      pCO2 Venous 52 (*)     pO2 Venous 33      Bicarbonate Venous 34 (*)     Base Excess/Deficit Venous 8.3 (*)     FIO2 21      Oxyhemoglobin Venous 59 (*)     O2 Sat, Venous 59.2 (*)    ROUTINE UA WITH MICROSCOPIC REFLEX TO CULTURE - Abnormal    Color Urine Light Yellow      Appearance Urine Clear      Glucose Urine >=1000 (*)     Bilirubin Urine Negative      Ketones Urine Negative      Specific Gravity Urine 1.010      Blood Urine Negative      pH Urine 6.0      Protein Albumin Urine Negative      Urobilinogen Urine Normal      Nitrite Urine Negative      Leukocyte Esterase Urine Negative      RBC Urine <1      WBC Urine 1      Squamous Epithelials  Urine 1     CBC WITH PLATELETS AND DIFFERENTIAL - Abnormal    WBC Count 8.0      RBC Count 3.89      Hemoglobin 12.3      Hematocrit 36.5      MCV 94      MCH 31.6      MCHC 33.7      RDW 12.6      Platelet Count 699 (*)     % Neutrophils 68      % Lymphocytes 21      % Monocytes 9      % Eosinophils 1      % Basophils 1      % Immature Granulocytes 1      NRBCs per 100 WBC 0      Absolute Neutrophils 5.5      Absolute Lymphocytes 1.7      Absolute Monocytes 0.7      Absolute Eosinophils 0.1      Absolute Basophils 0.1      Absolute Immature Granulocytes 0.1      Absolute NRBCs 0.0     GLUCOSE BY METER - Abnormal    GLUCOSE BY METER POCT 354 (*)    KETONE BETA-HYDROXYBUTYRATE QUANTITATIVE, RAPID - Normal    Ketone (Beta-Hydroxybutyrate) Quantitative <0.18     GLUCOSE MONITOR NURSING POCT     CT Abdomen Pelvis w/o Contrast   Final Result   IMPRESSION:    1. Hypoattenuating kidneys, most likely secondary to medical renal   disease.   2. Otherwise no acute findings in the abdomen and pelvis in this   limited noncontrast CT examination.   3. Extensive surgical changes. G-tube and J-tube are in place.      I have personally reviewed the examination and initial interpretation   and I agree with the findings.      GUICHO WATSON MD            SYSTEM ID:  [id number]             Critical care was not performed.     Medical Decision Making  The patient's presentation was of high complexity (a chronic illness severe exacerbation, progression, or side effect of treatment).    The patient's evaluation involved:  review of external note(s) from 3+ sources (see separate area of note for details)  review of 3+ test result(s) ordered prior to this encounter (see separate area of note for details)  ordering and/or review of 3+ test(s) in this encounter (see separate area of note for details)    The patient's management necessitated moderate risk (prescription drug management including medications given in the ED).    Assessment &  Plan    59-year-old female with a history of chronic abdominal pain with multiple admissions in the past for persistent abdominal pain, chronic pancreatitis, post pancreatectomy diabetes, GERD, hypothyroidism presenting to the emergency department due to increasing abdominal pain associated with abdominal distention, dysuria, back pain, and new lab work 2 days ago demonstrating renal failure, and hyperglycemia noted to be dehydrated on appearance with heart rate of 103, but otherwise hemodynamically stable.    Her abdomen is slightly distended, has tenderness primarily in the left lower quadrant, nonperitonitic low suspicion of perforation.  With her new kidney injury, would not recommend doing any IV contrast but will obtain a noncontrasted CT of her abdomen and pelvis to rule out bowel obstruction, or complications.  Will also plan for urinalysis to evaluate for UTI/pyelonephritis.  Temperature here 99, not meeting sepsis criteria, however low threshold to give antibiotics based on lab work.  Will also give IV fluids as she does appear to be dehydrated, and is creating urine.  She does have some mild lower extremity edema, however I am less convinced that she is hypervolemic  3:50 PM lab work overall does demonstrate significant hyperglycemia but no evidence of DKA.  After IV fluids, blood sugar has come down and she did take her insulin last night as well as this morning.  She continues to endorse severe abdominal pain that radiates across the top of her abdomen into bilateral flanks.  She does have a history of acute on chronic abdominal pain, but her lab work today does demonstrate worsening AST and ALT.  Overall, patient reports that her pain is too severe and she does not feel comfortable with the plan for discharge home.  Will plan for observation evaluation couple possible MRCP as I do not believe that ultrasound would be very helpful in her as she is status post cholecystectomy    I have reviewed the  nursing notes. I have reviewed the findings, diagnosis, plan and need for follow up with the patient.    New Prescriptions    No medications on file       Final diagnoses:   Generalized abdominal pain   Hyperglycemia   ANGELINA (acute kidney injury)   Transaminitis       Krysta Moran MD  [facility] EMERGENCY DEPARTMENT  5/15/2025     Krysta Moran MD  05/15/25 [id number]

## 2025-05-15 NOTE — MEDICATION SCRIBE - ADMISSION MEDICATION HISTORY
"Medication Scribe Admission Medication History    Admission medication history is complete. The information provided in this note is only as accurate as the sources available at the time of the update.    Information Source(s): Patient via in-person    Pertinent Information: Chantell reports that for the lantus she is injecting 18 units every morning and 18 units every evening and for the insulin NPH she is injecting 8 units every morning. Per recent diabetes education visit on 05/18/25, she injects \"lantus 18 units in PM and 18 units at HS, novolog standard sliding scale, if reads high gives 10 units, ICR 1 unit per 12 grams of CHO's, and NPH 8 units in the AM.\" Patient denies taking any other medications. Dispense report and outside medication reconciliation list have been reviewed.     Changes made to PTA medication list:  Added: None  Deleted: None  Changed:   insulin glargine (LANTUS PEN) 100 UNIT/ML pen. Inject 4 Units subcutaneously every evening. ---> insulin glargine (LANTUS PEN) 100 UNIT/ML pen. Inject 18 Units subcutaneously 2 times daily. 18 units every morning and 18 units every morning  insulin  UNIT/ML vial. Inject 4 Units subcutaneously every morning AND 3 Units every evening. ---> insulin  UNIT/ML vial. 8 units every morning  lipase-protease-amylase (CREON 12) 26268-19[address]-48383 units CPEP. Take 5 capsules by mouth 3 times daily (with meals). ---> lipase-protease-amylase (CREON 12) [id number] units CPEP. Take 8 capsules by mouth 3 times daily (with meals) and 6 capsules with snacks.     Allergies reviewed with patient and updates made in EHR: yes    Medication History Completed By: Amairani Simental 5/15/2025 4:50 PM    PTA Med List   Medication Sig Last Dose/Taking    acetaminophen (TYLENOL) 325 MG tablet Take 3 tablets (975 mg) by mouth every 8 hours 5/15/2025 at  8:00 AM    Acetone, Urine, Test (KETONE TEST) STRP 1 each by In Vitro route as needed (hyperglycemia) Taking As Needed    " Alcohol Swabs PADS Use to swab the area of the injection or tiago as directed Per insurance coverage 5/15/2025 Morning    alendronate (FOSAMAX) 70 MG tablet Take 1 tablet (70 mg) by mouth every 7 days On Sundays take first thing in the morning with plain water and remain upright for at least 30 minutes and until after first food of the day    Do not restart Fosamax (alendronate) until your difficulty swallowing has resolved and you have finished the entire course of fluconazole (Diflucan). 5/11/2025    alum & mag hydroxide-simethicone (MYLANTA/MAALOX) 200-200-25 MG CHEW chewable tablet Take 1 tablet by mouth 3 times daily as needed for indigestion Taking As Needed    Continuous Glucose  (DEXCOM G7 ) RICARDO Use to read blood sugars as per 's instructions. Taking    Continuous Glucose Sensor (DEXCOM G7 SENSOR) MISC Change every 10 days. 5/13/2025    CONTOUR NEXT TEST test strip USE TO TEST BLOOD SUGAR 6 TIMES DAILY OR AS DIRECTED. Call clinic to schedule follow up appointment.  IMPORTANT Taking    cyclobenzaprine (FLEXERIL) 5 MG tablet May take 1.5 tablets (7.5 mg) by mouth 3 times daily as needed for muscle spasms. May also take 1 tablet (5 mg) every evening as needed for muscle spasms. 5/14/2025 at  8:00 PM    diclofenac (FLECTOR) 1.3 % Patch Place 1 patch onto the skin 2 times daily 5/14/2025 at  8:00 PM    diclofenac (VOLTAREN) 1 % GEL 2 g Apply 2 g to skin four times a day as needed (to affected upper extremity joint(s)). Maximum 8g/day per joint, 16g/day total. Taking    Digestive Enzyme Cartridge (RELIZORB) Device Place 1 each (1 Device) into OG Tube 2 times daily. Administer as 1 cartridge every 12 hours 5/14/2025 at  8:00 PM    dronabinol (MARINOL) 2.5 MG capsule Take 2 capsules (5 mg) by mouth 2 times daily as needed 5/14/2025 at  8:00 PM    DULoxetine (CYMBALTA) 30 MG capsule Take 1 capsule (30 mg) by mouth daily With 60mg capsule for total dose of 90mg 5/14/2025 at  9:00 AM     DULoxetine (CYMBALTA) 60 MG EC capsule Take 1 capsule (60 mg) by mouth daily With 30mg capsule for total dose of 90mg 5/14/2025 at  9:00 AM    famotidine (PEPCID) 20 MG tablet Take 1 tablet (20 mg) by mouth At Bedtime 5/14/2025 at  8:00 PM    furosemide (LASIX) 40 MG tablet Take 1 tablet (40 mg) by mouth daily. 5/15/2025 Morning    gabapentin (NEURONTIN) 400 MG capsule Take 1 capsule (400 mg) by mouth 3 times daily. 5/14/2025 at  8:00 PM    Glucagon (GVOKE HYPOPEN 2-PACK) 1 MG/0.2ML pen Inject the contents of 1 device under the skin into lower abdomen, outer thigh, or outer upper arm as needed for hypoglycemia. If no response after 15 minutes, additional 1 mg dose from a new device may be injected while waiting for emergency assistance. Taking    hydrocortisone (CORTEF) 10 MG tablet Take 10 mg in the morning and 10 mg along with a 5 mg tablet at bedtime for a total dose of 15 mg at bedtime. Watch for hypoglycemia recurrence. 5/15/2025 Morning    hydrocortisone (CORTEF) 5 MG tablet Take 5 mg by mouth At Bedtime along with a 10 mg tablet for a total dose of 15 mg 5/14/2025 at  8:00 PM    hydrocortisone sodium succinate PF (SOLU-CORTEF) 100 MG injection Inject 100mg (1 mL) into muscle in emergency or unable to take oral hydrocortisone. Go to emergency room if given. ActoVial NDC [address]-04. Note to pharmacy: Provide two 3ml syringes with 23g 1 inch needles. Taking    Injection Device for insulin (NOVOPEN ECHO) RICARDO Use with   Insulin Taking    insulin aspart (NOVOLOG PEN) 100 UNIT/ML pen Continue Novolog Meal Coverage: 1 unit per 14 grams CHO, TID AC and 1:15 PRN with snacks/supplements - Continue Novolog Correction Scale: 1:75>150 TID AC, HS, 0200 at 1400 5/14/2025 at  6:30 PM    Insulin Disposable Pump (OMNIPOD 5 G6 INTRO, GEN 5,) KIT 1 each See Admin Instructions Use continuously to infuse insulin. Taking    insulin glargine (LANTUS PEN) 100 UNIT/ML pen Inject 4 Units subcutaneously every evening. (Patient taking  differently: Inject 18 Units subcutaneously 2 times daily. 18 units every morning and 18 units every morning) 5/14/2025 at  8:00 PM    insulin  UNIT/ML vial Inject 4 Units subcutaneously every morning AND 3 Units every evening. (Patient taking differently: 8 units every morning) 5/15/2025 Morning    insulin pen needle (PIP PEN NEEDLES 32G X 4MM) 32G X 4 MM miscellaneous Use 6 pen needles daily or as directed. 5/15/2025 Morning    Hotelements 1.5 Peptide Plain 325 mL Place 1,080 mLs into J tube daily. Infuse Hotelements Peptide 1.5 @ 45 ml/hr into J-tube via pump  Water flush: 120 ml tid + an additional 550 ml through flushes or oral intake  Kcals: 1662  Cartons per day: 3.5 Taking    levothyroxine (SYNTHROID/LEVOTHROID) 125 MCG tablet Take 125 mcg by mouth every morning (before breakfast). 5/15/2025 Morning    linaclotide (LINZESS) 145 MCG capsule Take 145 mcg by mouth every morning (before breakfast) as needed 5/15/2025 Morning    lipase-protease-amylase (CREON 12) [id number] units CPEP Take 5 capsules by mouth 3 times daily (with meals). 5/14/2025 at  6:30 PM    metoclopramide (REGLAN) 5 MG tablet Take 2 tablets (10 mg) by mouth 3 times daily 5/14/2025 at  8:00 PM    Naloxone HCl 8 MG/0.1ML LIQD Spray 1 spray in nostril once as needed. INSERT THE TIP OF THE NOZZLE INTO ONE NOSTRIL AND FIRMLY PRESS PLUNGER TO GIVE ONE DOSE FOR SUSPECTED OPIOID OVERDOSE. CALL 911.  OPEN SECOND PACK, IF NEEDED,  AND REPEAT EVERY 2 TO 3 MINUTES IN ALTERNATE NOSTRIL UNTIL MEDICAL ASSISTANCE ARRIVES. Taking As Needed    Nutritional Supplements (BOOST HIGH PROTEIN) LIQD After above baseline labs are drawn, give: 6 mL/kg to maximum of 360 mL; the beverage is to be consumed within 5 minutes. 5/14/2025 Noon    ondansetron (ZOFRAN) 4 MG tablet Take 1 tablet (4 mg) by mouth every 8 hours as needed for nausea 5/14/2025 at  5:30 PM    oxyCODONE (ROXICODONE) 5 MG tablet Take 1-2 tablets (5-10 mg) by mouth every 6 hours as needed for  pain. Take the lowest possible dose to control your pain, and use non-opioid pain options first. Decrease the number of pills you take each day after you discharge until you are able to stop completely. 5/15/2025 at  8:00 AM    pantoprazole (PROTONIX) 40 MG EC tablet Take 1 tablet (40 mg) by mouth 2 times daily 5/14/2025 at  8:00 PM    polyethylene glycol (MIRALAX/GLYCOLAX) packet Take 1 packet by mouth 2 times daily. 5/14/2025 at  8:00 PM    potassium chloride ER (KLOR-CON M) 20 MEQ CR tablet Take 1 tablet (20 mEq) by mouth daily 5/15/2025 Morning    senna-docusate (SENOKOT-S/PERICOLACE) 8.6-50 MG tablet Take 1-2 tablets by mouth daily as needed for constipation 5/14/2025 at  8:00 PM    sodium chloride (OCEAN) 0.65 % nasal spray Spray 1 spray into both nostrils daily as needed for congestion 5/13/2025    sucralfate (CARAFATE) 1 GM tablet Take 1 tablet (1 g) by mouth 4 times daily (before meals and nightly) 5/14/2025 at  8:00 PM    SUMAtriptan (IMITREX) 50 MG tablet Take 1 tablet (50 mg) by mouth at onset of headache for migraine Take 1 Tab by mouth Once as needed for Migraine Headache. May repeat after two hours.  Maximum dose 200 mg/24 hours. 5/14/2025 at  2:00 PM    thiamine (B-1) 100 MG tablet Place 1 tablet (100 mg) into Feeding Tube daily. 5/15/2025 Morning    topiramate (TOPAMAX) 100 MG tablet Take 1 tablet (100 mg) by mouth 2 times daily 5/14/2025 at  8:00 PM    traZODone (DESYREL) 100 MG tablet TAKE 1-2 TABLETS BY MOUTH AN HOUR BEFORE BEDTIME 5/14/2025 at  8:00 PM

## 2025-05-15 NOTE — PROGRESS NOTES
[facility]    Patient is currently open to home care services with [facility]. The patient has RN PT OT [facility]   and 6/25/25.   [facility] liaison will continue to follow patient during stay. If  appropriate provide orders to resume home care at time of discharge.

## 2025-05-15 NOTE — PROGRESS NOTES
I called the patient and reviewed her lab results which have all come back.  She is in acute kidney failure (acute kidney injury) with creatinine having gone up in 7 days from 0.37 to now 3.05.  She is also hyperglycemic.  Inform patient the cause of her acute kidney injury is not quite clear to me though hyperglycemia can lead to metabolic acidosis and acute kidney injury.  She did not seem clinically dehydrated when I saw her.  I have instructed her to go to the emergency room at [facility].  She she agreed.  The thyroid function study is mildly abnormal with slightly low free T4 and slightly elevated TSH.  It is hard to interpret this results in severe illness.  Euthyroid sick syndrome is a possibility though usually TSH tend to be low in euthyroid sick syndrome.

## 2025-05-15 NOTE — H&P
[facility]    History and Physical - Hospitalist Service, GOLD TEAM        Date of Admission:  5/15/2025    Assessment & Plan      Chantell Kidd is a 59 year old female admitted on 5/15/2025. She has PMH of insulin-dependent diabetes mellitus after pancreatectomy, chronic pancreatitis, migraine, hypothyroidism, and venessa-en-Y with G tube nutrition who presented due to abdominal and back pain, will be admitted to medicine team for management.    Acute on chronic abdominal pain   Transaminitis  Hx Venessa-en-Y (2012) s/p PEG for nutrition  S/p cholecystectomy (2004)  Patient has had multiple recent admissions due to abdominal pain and issues with PEG tube. Admitted 4/16-2/24 as well as 4/30-5/6 with PEG-J repositioned/replaced by GI on 4/17 as well as 5/2. She presented again 5/15 due to acute worsening of abdominal pain, worse with TF though she has been able to continue them. Reports fever x1 earlier in the week and has since been using Tylenol without additional fever. Note she has been using Tylenol 1000mg q6 for the last month. Vitals on admit with mild tachycardia to 103, temp 37.2, BP WNL. Overall improved after IVF administration and pain control. CT AP w/o on admit with no acute abdominal findings, GJ in place. Labs concerning for mildly elevated LFTs with , ALT 89, Alk phos 152. Tbili 0.3. WBC WNL. Patient feels unable to adequately manage abdominal pain at home at this time; previously required IP Pain team assistance with plan to establish OP in the coming months.   -- GI consulted, appreciate recommendations   > No urgent need for procedures at this time, ok for [facility]   > Hepatic US ordered  -- Trend CMP daily  -- Pain plan:    > Pain team consulted, appreciate recommendations              > Acetaminophen 650mg PO TID              > Flexeril 5 mg PO TID PRN + 5mg at bedtime PRN               > Oxycodone 5-10 mg PO q4h PRN              > Gabapentin 400 mg  TID (recently increased)   > Dilaudid 0.4mg IV q4 PRN for breakthrough   > Kpad   > Icy hot PRN  -- Continue PTA Creon 96,000U TID   -- Continue PTA famotidine, protonix, Linzess, reglan and sucralfate  -- Zofran PRN for nausea     C/f recent ANGELINA, improved  Patient had labs with PCP during hospital follow up appointment on 5/13 during which her Cr was 3.05, up from her baseline of ~0.4. She presented on 5/15 to the ED with abdominal pain that radiates to the back. Also complaining of increasing urinary frequency and mild dysuria. Cr on admit back to baseline of 0.41. UA negative for signs of infection, though with significant glucosuria which may be contributing to symptoms. Question outpatient lab accuracy.  -- Daily BMP  -- Avoid nephrotoxins as able  -- I/O    Post-pancreatectomy diabetes (Type 1)  Chronic Pancreatitis and Sphincter of Oddi dysfunction s/p TPAIT (1/2012)  Patient has poorly controlled diabetes with multiple presentations with glucose >700. Today, presented with BG of 571. Most recent HbA1c of 15.8 4/30/25. No anion gap, Ketones <0.18. In the ED, she was given 1L IVF and glucose improved to 354. Has previously been seen by inpatient endocrinology team with concern for patient not using insulin at home, though she reports taking her insulin as directed prior to admission. Most recent OP Endo visit was on 5/13. Home regimen includes Lantus 18U qAM and at bedtime, NPH 8U qAM, and Novolog sliding scale, and ICR 1U:12CHO with meals. Most readings at home are significantly elevated and this has been a target of outpatient management.   -- Endocrinology consulted, appreciate recommendations  -- Continue PTA Lantus 18U BID (qAM and at bedtime)   -- Continue PTA NPH 8U qAM  -- Continue PTA Novolog meal coverage: 1 Unit per 12 grams CHO, TID AC and 1 Unit per 15 grams CHO with snacks/supplements  -- MSSI   -- Continue PTA Creon 96,000U TID with meals    -- Glucose checks ACHS  -- Hypoglycemia  protocol    BLE Edema  Has been ongoing for several months, she notes being started on Lasix 20mg daily and this was increased to 40mg BID by provider in [place] in the fall. She had BLE US during most recent admission 4/30/25 that was negative for DVT and confirmed soft tissue edema bilaterally. Over the last couple weeks, left leg is slightly more swollen than the right. No history of injury, fracture, or surgery on this leg before. No erythema or wounds.  -- BLE Duplex to eval for DVT  -- Continue PTA Lasix 40mg BID  -- GAIL hose during the day    Malnutrition   Cachexia   GJ in place with continuous TF at 45ml/hour. Reports she has been able to continue at this rate despite abdominal discomfort.   -- Nutrition consulted   > Continue Nutren 1.5 @ 40 ml/hr, will hold at midnight for hepatic US    History of adrenal insufficiency  Followed by Dr. Maher. Previously suppressed AM cortisol and has been on empiric therapy for possible adrenal insufficiency, unclear if central vs transient. Had previously been unable to wean steroids due to hypoglycemia episodes. Most recently saw Dr Maher 4/3/25 and goal is to retest in the near future with low dose ACTH stimulation testing.  -- Continue PTA hydrocortisone 10mg in the AM and 15mg in the PM   -- Encourage Endocrinology follow up on discharge for repeat low dose ACTH stimulation testing     Hypothyroidism  -- Continue PTA levothyroxine     MDD, Anxiety  Insomnia  -- Continue PTA duloxetine, trazodone, topiramate        Diet: Combination Diet Regular Diet Adult; Moderate Consistent Carb (60 g CHO per Meal) Diet  NPO for Procedure/Surgery per Anesthesia Guidelines Except for: Meds; Clear liquids before procedure/surgery: ADULT (Age GREATER than or Equal to 18 years) - Clear liquids 2 hours before procedure/surgery   DVT Prophylaxis: Pneumatic Compression Devices  Mckee Catheter: Not present  Lines: None     Cardiac Monitoring: None  Code Status: Full Code     Clinically  Significant Risk Factors Present on Admission          # Hypochloremia: Lowest Cl = 94 mmol/L in last 2 days, will monitor as appropriate                        # Financial/Environmental Concerns:           Disposition Plan     Medically Ready for Discharge: Anticipated in 2-4 Days         The patient's care was discussed with the Attending Physician, Dr. Banuelos, Bedside Nurse, Patient, and GI Consultant(s).    Shivani Simon PA-C  Hospitalist Service, [facility]  Securely message with [employer] (more info)  Text page via [facility] Paging/Directory   See signed in provider for up to date coverage information    ______________________________________________________________________    Chief Complaint   Abdominal pain    History is obtained from the patient    History of Present Illness   Chantell Kidd is a 59 year old female admitted on 5/15/2025. 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    Patient reports subacute worsening of abdominal pain that hasn't really improved over the last month and a half despite her previous admissions. Feels like she is getting more bloated after meals which is quite uncomfortable. Pain is wrapping around the left lateral side to the back which is newer for her. Denies N/V, diarrhea and constipation. Reports feeling feverish a few times earlier this week but has been using Tylenol 1000mg q6 regularly which seems to help with fevers but not pain. Has been on Oxycodone outpatient with minimal pain control; planning to establish with pain team outpatient but unable to get in for another month or so.     Past Medical History    Past Medical History:   Diagnosis Date    Chronic abdominal pain     Chronic pancreatitis (H)     S/P  pancreatectomy    Depression with anxiety     Gastro-oesophageal reflux disease     Gastroparesis 05/13/2025    Hypothyroidism 04/23/2015    Kidney stones     Low serum cortisol level     Migraines     Other chronic pain     STOMACH    Other chronic pain     LUMBAR SPINE    Peripheral neuropathy     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    TPIAT    Spasm of sphincter of Oddi        Past Surgical History   Past Surgical History:   Procedure Laterality Date    ARTHROPLASTY CARPOMETACARPAL (THUMB JOINT)  05/02/2014    Procedure: ARTHROPLASTY CARPOMETACARPAL (THUMB JOINT);  Surgeon: Carina Panda MD;  Location: [facility] OR    CHOLECYSTECTOMY  01/01/2004    COLONOSCOPY  07/18/2014    Procedure: COLONOSCOPY;  Surgeon: Aurora Sahu MD;  Location:  GI    COLONOSCOPY N/A 08/01/2017    Procedure: COLONOSCOPY;  Colonoscopy and upper endoscopy;  Surgeon: Deirdre Harris MD;  Location:  GI    ENDOSCOPIC INSERTION TUBE JEJUNOSTOMY N/A 5/2/2025    Procedure: Esophagogastroduodenoscopy, Jejunopexy, JEJUNOSTOMY TUBE PLACEMENT, GASTROSTOMY TUBE EXCHANGE;  Surgeon: Jose Francisco Perdomo MD;  Location:  OR    ENDOSCOPIC RETROGRADE CHOLANGIOPANCREATOGRAM      ENDOSCOPIC RETROGRADE CHOLANGIOPANCREATOGRAM  04/19/2011    Procedure:ENDOSCOPIC RETROGRADE CHOLANGIOPANCREATOGRAM; Pancreatic Stent Placement      ENDOSCOPIC RETROGRADE CHOLANGIOPANCREATOGRAM  05/26/2011    Procedure:ENDOSCOPIC RETROGRADE CHOLANGIOPANCREATOGRAM; with Pancreatic Stent Removal; Surgeon:DALE MIMS; Location: OR    ENDOSCOPY UPPER, COLONOSCOPY, COMBINED  04/25/2012    Procedure:COMBINED ENDOSCOPY UPPER, COLONOSCOPY; Enteroscopy with Bile Duct Stent Removal, Colonoscopy  *[facility] Safe Room*; Surgeon:GRACY GODWIN; Location: OR    ESOPHAGOSCOPY, GASTROSCOPY, DUODENOSCOPY (EGD), COMBINED  05/26/2011    Procedure:COMBINED ESOPHAGOSCOPY, GASTROSCOPY, DUODENOSCOPY (EGD); Surgeon:DALE MISM; Location:  OR    ESOPHAGOSCOPY, GASTROSCOPY, DUODENOSCOPY (EGD), COMBINED N/A 10/30/2014    Procedure: COMBINED ESOPHAGOSCOPY, GASTROSCOPY, DUODENOSCOPY (EGD), BIOPSY SINGLE OR MULTIPLE;  Surgeon: Sarai Moon MD;  [facility]    ESOPHAGOSCOPY, GASTROSCOPY, DUODENOSCOPY (EGD), COMBINED Left 07/06/2015    Procedure: COMBINED ESOPHAGOSCOPY, GASTROSCOPY, DUODENOSCOPY (EGD), BIOPSY SINGLE OR MULTIPLE;  Surgeon: Thomas Estrada MD;  [facility]    ESOPHAGOSCOPY, GASTROSCOPY, DUODENOSCOPY (EGD), COMBINED N/A 07/08/2016    Procedure: COMBINED ESOPHAGOSCOPY, GASTROSCOPY, DUODENOSCOPY (EGD), BIOPSY SINGLE OR MULTIPLE;  Surgeon: Eloy Klein MD;  [facility]    ESOPHAGOSCOPY, GASTROSCOPY, DUODENOSCOPY (EGD), COMBINED N/A 08/04/2016    Procedure: COMBINED ESOPHAGOSCOPY, GASTROSCOPY, DUODENOSCOPY (EGD), BIOPSY SINGLE OR MULTIPLE;  Surgeon: Jason Brown MD;  [facility]    ESOPHAGOSCOPY, GASTROSCOPY, DUODENOSCOPY (EGD), COMBINED N/A 08/01/2017    Procedure: COMBINED ESOPHAGOSCOPY, GASTROSCOPY, DUODENOSCOPY (EGD);;  Surgeon: Deirdre Harris MD;  Location:  GI    ESOPHAGOSCOPY, GASTROSCOPY, DUODENOSCOPY (EGD), COMBINED N/A 06/12/2019    Procedure: ESOPHAGOGASTRODUODENOSCOPY (EGD);  Surgeon: Jose Francisco Perdomo MD;  Location:  GI    GYN SURGERY      Hysterectomy and USO    [facility] ENDOSCOPY W EUS  07/20/2011    Procedure:COMBINED ENDOSCOPIC ULTRASOUND, ESOPHAGOSCOPY, GASTROSCOPY, DUODENOSCOPY (EGD); Surgeon:DARVIN DONOHUE; Location: GI    HERNIORRHAPHY VENTRAL N/A 09/15/2016    Procedure: HERNIORRHAPHY VENTRAL;  Surgeon: Juanita Bernabe MD;  [facility]    HYSTERECTOMY  1997 or 1998    USO    INCISION AND DRAINAGE ABDOMEN WASHOUT, COMBINED  08/16/2012    Procedure: COMBINED INCISION AND DRAINAGE ABDOMEN WASHOUT;  ,debridement and Drainage Post Appendectomy;  Surgeon: Ron Austin MD;  [facility]    INJECT TRANSVERSUS ABDOMINIS PLANE  (TAP) BLOCK BILATERAL Bilateral 2016    Procedure: INJECT TRANSVERSUS ABDOMINIS PLANE (TAP) BLOCK BILATERAL;  Surgeon: Leonard Mccallum MD;  [facility]    IR NG TUBE PLACEMENT REQ RAD & FLUORO  2017    LAPAROSCOPIC APPENDECTOMY  2012    Procedure: LAPAROSCOPIC APPENDECTOMY;  Open Appendectomy;  Surgeon: Ron Austin MD;  [facility]    PANCREATECTOMY, TRANSPLANT AUTO ISLET CELL, COMBINED  2012    Procedure:COMBINED PANCREATECTOMY, TRANSPLANT AUTO ISLET CELL; Total  Pancreatectomy, Auto Islet Transplant, splenectomy, 18fr. transgastric-jejunal feeding tube placement, liver biopsy; Surgeon:PALAK LEE; [facility]    PICC DOUBLE LUMEN PLACEMENT Right 2025    5FR DL PICC, brachial medial vein. L-38cm, 3cm out.    PICC INSERTION - DOUBLE LUMEN Right 2025    39-3cm, Medial brachial vein    REPLACE GASTROSTOMY TUBE, PERCUTANEOUS N/A 2017    Procedure: REPLACE GASTROSTOMY TUBE, PERCUTANEOUS;  GJ Tube Change;  Surgeon: Jose Nath PA-C;  [facility]    SPLENECTOMY         Prior to Admission Medications   Prior to Admission Medications   Prescriptions Last Dose Informant Patient Reported? Taking?   Acetone, Urine, Test (KETONE TEST) STRP   No Yes   Si each by In Vitro route as needed (hyperglycemia)   Alcohol Swabs PADS 5/15/2025 Morning  No Yes   Sig: Use to swab the area of the injection or tiago as directed Per insurance coverage   CONTOUR NEXT TEST test strip   No Yes   Sig: USE TO TEST BLOOD SUGAR 6 TIMES DAILY OR AS DIRECTED. Call clinic to schedule follow up appointment.  IMPORTANT   Continuous Glucose  (DEXCOM G7 ) RICARDO   No Yes   Sig: Use to read blood sugars as per 's instructions.   Continuous Glucose Sensor (DEXCOM G7 SENSOR) MISC 2025  No Yes   Sig: Change every 10 days.   DULoxetine (CYMBALTA) 30 MG capsule 2025 at  9:00 AM  No Yes   Sig: Take 1 capsule (30 mg) by mouth daily With  60mg capsule for total dose of 90mg   DULoxetine (CYMBALTA) 60 MG EC capsule 2025 at  9:00 AM  No Yes   Sig: Take 1 capsule (60 mg) by mouth daily With 30mg capsule for total dose of 90mg   Digestive Enzyme Cartridge (RELIZORB) Device 2025 at  8:00 PM  No Yes   Sig: Place 1 each (1 Device) into OG Tube 2 times daily. Administer as 1 cartridge every 12 hours   Glucagon (GVOKE HYPOPEN 2-PACK) 1 MG/0.2ML pen   No Yes   Sig: Inject the contents of 1 device under the skin into lower abdomen, outer thigh, or outer upper arm as needed for hypoglycemia. If no response after 15 minutes, additional 1 mg dose from a new device may be injected while waiting for emergency assistance.   Injection Device for insulin (NOVOPEN ECHO) RICARDO   No Yes   Sig: Use with   Insulin   Insulin Disposable Pump (OMNIPOD 5 G6 INTRO, GEN 5,) KIT   No Yes   Si each See Admin Instructions Use continuously to infuse insulin.   Kudoala 1.5 Peptide Plain 325 mL   No Yes   Sig: Place 1,080 mLs into J tube daily. Infuse Kudoala Peptide 1.5 @ 45 ml/hr into J-tube via pump  Water flush: 120 ml tid + an additional 550 ml through flushes or oral intake  Kcals: 1662  Cartons per day: 3.5   Naloxone HCl 8 MG/0.1ML LIQD   No Yes   Sig: Spray 1 spray in nostril once as needed. INSERT THE TIP OF THE NOZZLE INTO ONE NOSTRIL AND FIRMLY PRESS PLUNGER TO GIVE ONE DOSE FOR SUSPECTED OPIOID OVERDOSE. CALL 911.  OPEN SECOND PACK, IF NEEDED,  AND REPEAT EVERY 2 TO 3 MINUTES IN ALTERNATE NOSTRIL UNTIL MEDICAL ASSISTANCE ARRIVES.   Nutritional Supplements (BOOST HIGH PROTEIN) LIQD 2025 Noon  No Yes   Sig: After above baseline labs are drawn, give: 6 mL/kg to maximum of 360 mL; the beverage is to be consumed within 5 minutes.   SUMAtriptan (IMITREX) 50 MG tablet 2025 at  2:00 PM Self No Yes   Sig: Take 1 tablet (50 mg) by mouth at onset of headache for migraine Take 1 Tab by mouth Once as needed for Migraine Headache. May repeat after two hours.   Maximum dose 200 mg/24 hours.   Sharps Container MISC   No No   Sig: Use as directed to dispose of needles, lancets and other sharps   acetaminophen (TYLENOL) 325 MG tablet 5/15/2025 at  8:00 AM  Yes Yes   Sig: Take 3 tablets (975 mg) by mouth every 8 hours   alendronate (FOSAMAX) 70 MG tablet 2025  No Yes   Sig: Take 1 tablet (70 mg) by mouth every 7 days On Sundays take first thing in the morning with plain water and remain upright for at least 30 minutes and until after first food of the day    Do not restart Fosamax (alendronate) until your difficulty swallowing has resolved and you have finished the entire course of fluconazole (Diflucan).   alum & mag hydroxide-simethicone (MYLANTA/MAALOX) 200-200-25 MG CHEW chewable tablet   Yes Yes   Sig: Take 1 tablet by mouth 3 times daily as needed for indigestion   cyclobenzaprine (FLEXERIL) 5 MG tablet 2025 at  8:00 PM  No Yes   Sig: May take 1.5 tablets (7.5 mg) by mouth 3 times daily as needed for muscle spasms. May also take 1 tablet (5 mg) every evening as needed for muscle spasms.   diclofenac (FLECTOR) 1.3 % Patch 2025 at  8:00 PM  No Yes   Sig: Place 1 patch onto the skin 2 times daily   diclofenac (VOLTAREN) 1 % GEL  Self Yes Yes   Si g Apply 2 g to skin four times a day as needed (to affected upper extremity joint(s)). Maximum 8g/day per joint, 16g/day total.   dronabinol (MARINOL) 2.5 MG capsule 2025 at  8:00 PM  No Yes   Sig: Take 2 capsules (5 mg) by mouth 2 times daily as needed   famotidine (PEPCID) 20 MG tablet 2025 at  8:00 PM  No Yes   Sig: Take 1 tablet (20 mg) by mouth At Bedtime   furosemide (LASIX) 40 MG tablet 5/15/2025 Morning  No Yes   Sig: Take 1 tablet (40 mg) by mouth daily.   gabapentin (NEURONTIN) 400 MG capsule 2025 at  8:00 PM  No Yes   Sig: Take 1 capsule (400 mg) by mouth 3 times daily.   hydrocortisone (CORTEF) 10 MG tablet 5/15/2025 Morning  Yes Yes   Sig: Take 10 mg in the morning and 10 mg along  with a 5 mg tablet at bedtime for a total dose of 15 mg at bedtime. Watch for hypoglycemia recurrence.   hydrocortisone (CORTEF) 5 MG tablet 5/14/2025 at  8:00 PM  Yes Yes   Sig: Take 5 mg by mouth At Bedtime along with a 10 mg tablet for a total dose of 15 mg   hydrocortisone sodium succinate PF (SOLU-CORTEF) 100 MG injection   No Yes   Sig: Inject 100mg (1 mL) into muscle in emergency or unable to take oral hydrocortisone. Go to emergency room if given. ActoVial NDC [address]-03. Note to pharmacy: Provide two 3ml syringes with 23g 1 inch needles.   insulin  UNIT/ML vial 5/15/2025 Morning  No Yes   Sig: Inject 4 Units subcutaneously every morning AND 3 Units every evening.   Patient taking differently: 8 units every morning   insulin aspart (NOVOLOG PEN) 100 UNIT/ML pen 5/14/2025 at  6:30 PM  No Yes   Sig: Continue Novolog Meal Coverage: 1 unit per 14 grams CHO, TID AC and 1:15 PRN with snacks/supplements - Continue Novolog Correction Scale: 1:75>150 TID AC, HS, 0200 at 1400   insulin glargine (LANTUS PEN) 100 UNIT/ML pen 5/14/2025 at  8:00 PM  No Yes   Sig: Inject 4 Units subcutaneously every evening.   Patient taking differently: Inject 18 Units subcutaneously 2 times daily. 18 units every morning and 18 units every morning   insulin pen needle (PIP PEN NEEDLES 32G X 4MM) 32G X 4 MM miscellaneous 5/15/2025 Morning  No Yes   Sig: Use 6 pen needles daily or as directed.   levothyroxine (SYNTHROID/LEVOTHROID) 125 MCG tablet 5/15/2025 Morning  Yes Yes   Sig: Take 125 mcg by mouth every morning (before breakfast).   linaclotide (LINZESS) 145 MCG capsule 5/15/2025 Morning  Yes Yes   Sig: Take 145 mcg by mouth every morning (before breakfast) as needed   lipase-protease-amylase (CREON 12) 11954-03664-85878 units CPEP 5/14/2025 at  6:30 PM  No Yes   Sig: Take 5 capsules by mouth 3 times daily (with meals).   Patient taking differently: Take 8 capsules by mouth 3 times daily (with meals). 6 capsules with snacks    metoclopramide (REGLAN) 5 MG tablet 5/14/2025 at  8:00 PM  No Yes   Sig: Take 2 tablets (10 mg) by mouth 3 times daily   ondansetron (ZOFRAN) 4 MG tablet 5/14/2025 at  5:30 PM  No Yes   Sig: Take 1 tablet (4 mg) by mouth every 8 hours as needed for nausea   order for DME   No No   Sig: by [place]o[employer]unal Tube route [place]  Ph: [phone]  Fax: [phone]    Nutren 1.5 @ 10 ml/hr with advancement by 10 ml/hr q 8 hours to goal rate of 40 ml/hr.  This will provide 1440 kcals (27 kcal/kg/day), 65 g PRO (1.2 g/kg/day), 730 mL H2O, 169 g CHO and no Fiber daily.      oxyCODONE (ROXICODONE) 5 MG tablet 5/15/2025 at  8:00 AM  No Yes   Sig: Take 1-2 tablets (5-10 mg) by mouth every 6 hours as needed for pain. Take the lowest possible dose to control your pain, and use non-opioid pain options first. Decrease the number of pills you take each day after you discharge until you are able to stop completely.   pantoprazole (PROTONIX) 40 MG EC tablet 5/14/2025 at  8:00 PM  No Yes   Sig: Take 1 tablet (40 mg) by mouth 2 times daily   polyethylene glycol (MIRALAX/GLYCOLAX) packet 5/14/2025 at  8:00 PM Self Yes Yes   Sig: Take 1 packet by mouth 2 times daily.   potassium chloride ER (KLOR-CON M) 20 MEQ CR tablet 5/15/2025 Morning  No Yes   Sig: Take 1 tablet (20 mEq) by mouth daily   senna-docusate (SENOKOT-S/PERICOLACE) 8.6-50 MG tablet 5/14/2025 at  8:00 PM  No Yes   Sig: Take 1-2 tablets by mouth daily as needed for constipation   sodium chloride (OCEAN) 0.65 % nasal spray 5/13/2025  No Yes   Sig: Spray 1 spray into both nostrils daily as needed for congestion   sodium chloride, PF, 0.9% PF flush   No No   Sig: Inject 10-40 mLs into catheter every 8 hours. -- Flush J port and G port EACH with 30 ml water every 8 hours -- Flush J port with 30 ml water BEFORE AND AFTER medications to avoid clogging of this tube.   sucralfate (CARAFATE) 1 GM tablet 5/14/2025 at  8:00 PM  No Yes   Sig: Take 1 tablet (1  g) by mouth 4 times daily (before meals and nightly)   thiamine (B-1) 100 MG tablet 5/15/2025 Morning  No Yes   Sig: Place 1 tablet (100 mg) into Feeding Tube daily.   topiramate (TOPAMAX) 100 MG tablet 5/14/2025 at  8:00 PM  No Yes   Sig: Take 1 tablet (100 mg) by mouth 2 times daily   traZODone (DESYREL) 100 MG tablet 5/14/2025 at  8:00 PM  No Yes   Sig: TAKE 1-2 TABLETS BY MOUTH AN HOUR BEFORE BEDTIME      Facility-Administered Medications: None        Review of Systems    The 10 point Review of Systems is negative other than noted in the HPI or here.      Physical Exam   Vital Signs: Temp: 98.6  F (37  C) Temp src: Oral BP: 138/84 Pulse: 78   Resp: 16 SpO2: 99 % O2 Device: None (Room air)    Weight: 0 lbs 0 oz    Physical Exam  Vitals reviewed.   Constitutional:       General: She is not in acute distress.     Appearance: She is not diaphoretic.   Cardiovascular:      Rate and Rhythm: Normal rate and regular rhythm.      Heart sounds: No murmur heard.  Pulmonary:      Effort: Pulmonary effort is normal. No respiratory distress.      Breath sounds: No wheezing, rhonchi or rales.   Abdominal:      General: There is distension (mild).      Palpations: Abdomen is soft.      Tenderness: There is abdominal tenderness (BUQ).      Comments: GJ in place, minimal surrounding drainage   Musculoskeletal:      Right lower leg: Edema (1+ to knee) present.      Left lower leg: Edema (1-2+ to knee) present.   Skin:     General: Skin is warm and dry.      Findings: No erythema.   Neurological:      Mental Status: She is alert and oriented to person, place, and time.           Medical Decision Making       120 MINUTES SPENT BY ME on the date of service doing chart review, history, exam, documentation & further activities per the note.      Data     I have personally reviewed the following data over the past 24 hrs:    8.0  \   12.3   / 699 (H)     137 94 (L) 3.6 (L) /  354 (H)   4.0 28 0.41 (L) \     ALT: 89 (H) AST: 227 (H) AP:  152 (H) TBILI: 0.3   ALB: 4.0 TOT PROTEIN: 7.0 LIPASE: N/A       Imaging results reviewed over the past 24 hrs:   Recent Results (from the past 24 hours)   CT Abdomen Pelvis w/o Contrast    Narrative    EXAMINATION: CT ABDOMEN PELVIS W/O CONTRAST, 5/15/2025 11:22 AM    TECHNIQUE:  Helical CT images from the lung bases through the pubic  symphysis were obtained  without IV contrast.     COMPARISON: 4/30/2025    HISTORY: lower abdominal pain, LLQ>LUQ, new acute kidney injury    FINDINGS:    Abdomen and pelvis: Status post splenectomy, total pancreatectomy,  Venessa-en-Y with choledochojejunostomy and gastrojejunostomy,  hysterectomy, and appendectomy. G-tube in place. Left lower quadrant  J-tube in place. Mild pneumobilia, similar to prior. No hepatic  masses. Hypoattenuating kidneys, otherwise unremarkable. Similar  perianastomotic dilation in the right lower quadrant. No significant  lingular loops of bowel. Large stool burden. Unremarkable urinary  bladder. No free air or fluid in the abdomen.    Lung bases:  Unremarkable    Bones and soft tissues: No acute or suspicious lesions. Diffuse  osteopenia.      Impression    IMPRESSION:   1. Hypoattenuating kidneys, most likely secondary to medical renal  disease.  2. Otherwise no acute findings in the abdomen and pelvis in this  limited noncontrast CT examination.  3. Extensive surgical changes. G-tube and J-tube are in place.    I have personally reviewed the examination and initial interpretation  and I agree with the findings.    GUICHO WATSON MD         SYSTEM ID:  [id number]

## 2025-05-15 NOTE — ED TRIAGE NOTES
PT ARRIVED VIA TRIAGE. PT REPORTS AT CLINIC AND CALLED BY PROVIDER STATING TO COME TO ER FOR ACUTE KIDNEY FAILURE. PT ALSO REPORTS ABD AND BACK PAIN.     Triage Assessment (Adult)       Row Name 05/15/25 1013          Triage Assessment    Airway WDL WDL        Respiratory WDL    Respiratory WDL WDL        Skin Circulation/Temperature WDL    Skin Circulation/Temperature WDL WDL        Cardiac WDL    Cardiac WDL X;rhythm     Pulse Rate & Regularity tachycardic        Peripheral/Neurovascular WDL    Peripheral Neurovascular WDL WDL        Cognitive/Neuro/Behavioral WDL    Cognitive/Neuro/Behavioral WDL WDL

## 2025-05-16 ENCOUNTER — APPOINTMENT (OUTPATIENT)
Dept: ULTRASOUND IMAGING | Facility: CLINIC | Age: 60
DRG: 391 | End: 2025-05-16
Payer: MEDICARE

## 2025-05-16 LAB
ALBUMIN SERPL BCG-MCNC: 3.4 G/DL (ref 3.5–5.2)
ALP SERPL-CCNC: 126 U/L (ref 40–150)
ALT SERPL W P-5'-P-CCNC: 55 U/L (ref 0–50)
ANION GAP SERPL CALCULATED.3IONS-SCNC: 10 MMOL/L (ref 7–15)
AST SERPL W P-5'-P-CCNC: 45 U/L (ref 0–45)
BILIRUB SERPL-MCNC: 0.2 MG/DL
BUN SERPL-MCNC: 4.1 MG/DL (ref 8–23)
CALCIUM SERPL-MCNC: 9 MG/DL (ref 8.8–10.4)
CHLORIDE SERPL-SCNC: 101 MMOL/L (ref 98–107)
CREAT SERPL-MCNC: 0.43 MG/DL (ref 0.51–0.95)
EGFRCR SERPLBLD CKD-EPI 2021: >90 ML/MIN/1.73M2
ERYTHROCYTE [DISTWIDTH] IN BLOOD BY AUTOMATED COUNT: 12.8 % (ref 10–15)
GLUCOSE BLDC GLUCOMTR-MCNC: 100 MG/DL (ref 70–99)
GLUCOSE BLDC GLUCOMTR-MCNC: 145 MG/DL (ref 70–99)
GLUCOSE BLDC GLUCOMTR-MCNC: 169 MG/DL (ref 70–99)
GLUCOSE BLDC GLUCOMTR-MCNC: 182 MG/DL (ref 70–99)
GLUCOSE BLDC GLUCOMTR-MCNC: 227 MG/DL (ref 70–99)
GLUCOSE BLDC GLUCOMTR-MCNC: 241 MG/DL (ref 70–99)
GLUCOSE BLDC GLUCOMTR-MCNC: 242 MG/DL (ref 70–99)
GLUCOSE BLDC GLUCOMTR-MCNC: 295 MG/DL (ref 70–99)
GLUCOSE BLDC GLUCOMTR-MCNC: 38 MG/DL (ref 70–99)
GLUCOSE BLDC GLUCOMTR-MCNC: 43 MG/DL (ref 70–99)
GLUCOSE BLDC GLUCOMTR-MCNC: 81 MG/DL (ref 70–99)
GLUCOSE SERPL-MCNC: 55 MG/DL (ref 70–99)
HCO3 SERPL-SCNC: 28 MMOL/L (ref 22–29)
HCT VFR BLD AUTO: 33.5 % (ref 35–47)
HGB BLD-MCNC: 11.6 G/DL (ref 11.7–15.7)
MCH RBC QN AUTO: 32.2 PG (ref 26.5–33)
MCHC RBC AUTO-ENTMCNC: 34.6 G/DL (ref 31.5–36.5)
MCV RBC AUTO: 93 FL (ref 78–100)
PLATELET # BLD AUTO: 636 10E3/UL (ref 150–450)
POTASSIUM SERPL-SCNC: 3.3 MMOL/L (ref 3.4–5.3)
POTASSIUM SERPL-SCNC: 4.8 MMOL/L (ref 3.4–5.3)
PROT SERPL-MCNC: 6.4 G/DL (ref 6.4–8.3)
RBC # BLD AUTO: 3.6 10E6/UL (ref 3.8–5.2)
SODIUM SERPL-SCNC: 139 MMOL/L (ref 135–145)
WBC # BLD AUTO: 10.8 10E3/UL (ref 4–11)

## 2025-05-16 PROCEDURE — 250N000012 HC RX MED GY IP 250 OP 636 PS 637: Performed by: PHYSICIAN ASSISTANT

## 2025-05-16 PROCEDURE — 250N000011 HC RX IP 250 OP 636

## 2025-05-16 PROCEDURE — 99222 1ST HOSP IP/OBS MODERATE 55: CPT | Performed by: PHYSICIAN ASSISTANT

## 2025-05-16 PROCEDURE — 120N000002 HC R&B MED SURG/OB UMMC

## 2025-05-16 PROCEDURE — 76705 ECHO EXAM OF ABDOMEN: CPT | Mod: 26 | Performed by: STUDENT IN AN ORGANIZED HEALTH CARE EDUCATION/TRAINING PROGRAM

## 2025-05-16 PROCEDURE — 250N000013 HC RX MED GY IP 250 OP 250 PS 637: Performed by: STUDENT IN AN ORGANIZED HEALTH CARE EDUCATION/TRAINING PROGRAM

## 2025-05-16 PROCEDURE — 99223 1ST HOSP IP/OBS HIGH 75: CPT | Performed by: PHYSICIAN ASSISTANT

## 2025-05-16 PROCEDURE — 84132 ASSAY OF SERUM POTASSIUM: CPT | Performed by: INTERNAL MEDICINE

## 2025-05-16 PROCEDURE — 99232 SBSQ HOSP IP/OBS MODERATE 35: CPT | Performed by: INTERNAL MEDICINE

## 2025-05-16 PROCEDURE — 250N000013 HC RX MED GY IP 250 OP 250 PS 637: Performed by: INTERNAL MEDICINE

## 2025-05-16 PROCEDURE — 258N000001 HC RX 258: Performed by: PHYSICIAN ASSISTANT

## 2025-05-16 PROCEDURE — 85018 HEMOGLOBIN: CPT

## 2025-05-16 PROCEDURE — 258N000003 HC RX IP 258 OP 636: Performed by: INTERNAL MEDICINE

## 2025-05-16 PROCEDURE — 84075 ASSAY ALKALINE PHOSPHATASE: CPT

## 2025-05-16 PROCEDURE — 250N000013 HC RX MED GY IP 250 OP 250 PS 637

## 2025-05-16 PROCEDURE — 36415 COLL VENOUS BLD VENIPUNCTURE: CPT | Performed by: INTERNAL MEDICINE

## 2025-05-16 PROCEDURE — 36415 COLL VENOUS BLD VENIPUNCTURE: CPT

## 2025-05-16 PROCEDURE — S9342 HIT ENTERAL PUMP DIEM: HCPCS

## 2025-05-16 PROCEDURE — 258N000001 HC RX 258: Performed by: NURSE PRACTITIONER

## 2025-05-16 PROCEDURE — 76705 ECHO EXAM OF ABDOMEN: CPT

## 2025-05-16 RX ORDER — NICOTINE POLACRILEX 4 MG
15-30 LOZENGE BUCCAL
Status: DISCONTINUED | OUTPATIENT
Start: 2025-05-16 | End: 2025-05-24 | Stop reason: HOSPADM

## 2025-05-16 RX ORDER — CYCLOBENZAPRINE HCL 5 MG
5 TABLET ORAL
Status: DISCONTINUED | OUTPATIENT
Start: 2025-05-16 | End: 2025-05-22

## 2025-05-16 RX ORDER — POLYETHYLENE GLYCOL 3350 17 G/17G
17 POWDER, FOR SOLUTION ORAL 3 TIMES DAILY
Status: DISCONTINUED | OUTPATIENT
Start: 2025-05-16 | End: 2025-05-16

## 2025-05-16 RX ORDER — AMOXICILLIN 250 MG
2 CAPSULE ORAL 2 TIMES DAILY
Status: DISCONTINUED | OUTPATIENT
Start: 2025-05-16 | End: 2025-05-24 | Stop reason: HOSPADM

## 2025-05-16 RX ORDER — POLYETHYLENE GLYCOL 3350 17 G/17G
17 POWDER, FOR SOLUTION ORAL 3 TIMES DAILY
Status: DISCONTINUED | OUTPATIENT
Start: 2025-05-16 | End: 2025-05-24 | Stop reason: HOSPADM

## 2025-05-16 RX ORDER — CYCLOBENZAPRINE HCL 5 MG
5 TABLET ORAL 3 TIMES DAILY PRN
Status: DISCONTINUED | OUTPATIENT
Start: 2025-05-16 | End: 2025-05-22

## 2025-05-16 RX ORDER — TRAZODONE HYDROCHLORIDE 100 MG/1
200 TABLET ORAL EVERY EVENING
Status: DISCONTINUED | OUTPATIENT
Start: 2025-05-16 | End: 2025-05-24 | Stop reason: HOSPADM

## 2025-05-16 RX ORDER — ACETAMINOPHEN 650 MG/1
650 SUPPOSITORY RECTAL 3 TIMES DAILY
Status: DISCONTINUED | OUTPATIENT
Start: 2025-05-16 | End: 2025-05-24 | Stop reason: HOSPADM

## 2025-05-16 RX ORDER — AMOXICILLIN 250 MG
2 CAPSULE ORAL 2 TIMES DAILY
Status: DISCONTINUED | OUTPATIENT
Start: 2025-05-16 | End: 2025-05-16

## 2025-05-16 RX ORDER — ACETAMINOPHEN 325 MG/1
650 TABLET ORAL 3 TIMES DAILY
Status: DISCONTINUED | OUTPATIENT
Start: 2025-05-16 | End: 2025-05-24 | Stop reason: HOSPADM

## 2025-05-16 RX ORDER — TOPIRAMATE 50 MG/1
100 TABLET, FILM COATED ORAL 2 TIMES DAILY
Status: DISCONTINUED | OUTPATIENT
Start: 2025-05-16 | End: 2025-05-24 | Stop reason: HOSPADM

## 2025-05-16 RX ORDER — DEXTROSE MONOHYDRATE 100 MG/ML
INJECTION, SOLUTION INTRAVENOUS CONTINUOUS
Status: DISCONTINUED | OUTPATIENT
Start: 2025-05-16 | End: 2025-05-16

## 2025-05-16 RX ORDER — SIMETHICONE 80 MG
80 TABLET,CHEWABLE ORAL 4 TIMES DAILY
Status: DISCONTINUED | OUTPATIENT
Start: 2025-05-16 | End: 2025-05-24 | Stop reason: HOSPADM

## 2025-05-16 RX ORDER — POTASSIUM CHLORIDE 1500 MG/1
20 TABLET, EXTENDED RELEASE ORAL ONCE
Status: COMPLETED | OUTPATIENT
Start: 2025-05-16 | End: 2025-05-16

## 2025-05-16 RX ORDER — LIDOCAINE 4 G/G
1-2 PATCH TOPICAL
Status: DISCONTINUED | OUTPATIENT
Start: 2025-05-16 | End: 2025-05-24 | Stop reason: HOSPADM

## 2025-05-16 RX ORDER — FAMOTIDINE 20 MG/1
20 TABLET, FILM COATED ORAL AT BEDTIME
Status: DISCONTINUED | OUTPATIENT
Start: 2025-05-16 | End: 2025-05-24 | Stop reason: HOSPADM

## 2025-05-16 RX ORDER — DEXTROSE MONOHYDRATE 25 G/50ML
25-50 INJECTION, SOLUTION INTRAVENOUS
Status: DISCONTINUED | OUTPATIENT
Start: 2025-05-16 | End: 2025-05-24 | Stop reason: HOSPADM

## 2025-05-16 RX ORDER — FUROSEMIDE 20 MG/1
40 TABLET ORAL
Status: DISCONTINUED | OUTPATIENT
Start: 2025-05-16 | End: 2025-05-24 | Stop reason: HOSPADM

## 2025-05-16 RX ADMIN — TOPIRAMATE 100 MG: 50 TABLET, FILM COATED ORAL at 08:54

## 2025-05-16 RX ADMIN — FUROSEMIDE 40 MG: 20 TABLET ORAL at 16:54

## 2025-05-16 RX ADMIN — THIAMINE HCL TAB 100 MG 100 MG: 100 TAB at 08:48

## 2025-05-16 RX ADMIN — HYDROMORPHONE HYDROCHLORIDE 0.4 MG: 0.2 INJECTION, SOLUTION INTRAMUSCULAR; INTRAVENOUS; SUBCUTANEOUS at 00:42

## 2025-05-16 RX ADMIN — ONDANSETRON 4 MG: 2 INJECTION INTRAMUSCULAR; INTRAVENOUS at 02:20

## 2025-05-16 RX ADMIN — LEVOTHYROXINE SODIUM 125 MCG: 125 TABLET ORAL at 08:49

## 2025-05-16 RX ADMIN — FUROSEMIDE 40 MG: 40 TABLET ORAL at 08:49

## 2025-05-16 RX ADMIN — ACETAMINOPHEN 650 MG: 325 TABLET ORAL at 14:16

## 2025-05-16 RX ADMIN — SIMETHICONE 80 MG: 80 TABLET, CHEWABLE ORAL at 21:03

## 2025-05-16 RX ADMIN — POLYETHYLENE GLYCOL 3350 17 G: 17 POWDER, FOR SOLUTION ORAL at 14:16

## 2025-05-16 RX ADMIN — GABAPENTIN 400 MG: 400 CAPSULE ORAL at 08:49

## 2025-05-16 RX ADMIN — HYDROCORTISONE 15 MG: 10 TABLET ORAL at 20:59

## 2025-05-16 RX ADMIN — TRAZODONE HYDROCHLORIDE 200 MG: 100 TABLET ORAL at 21:01

## 2025-05-16 RX ADMIN — PANTOPRAZOLE SODIUM 40 MG: 40 TABLET, DELAYED RELEASE ORAL at 21:02

## 2025-05-16 RX ADMIN — TOPIRAMATE 100 MG: 50 TABLET, FILM COATED ORAL at 21:02

## 2025-05-16 RX ADMIN — POLYETHYLENE GLYCOL 3350 17 G: 17 POWDER, FOR SOLUTION ORAL at 20:58

## 2025-05-16 RX ADMIN — OXYCODONE 10 MG: 5 TABLET ORAL at 12:51

## 2025-05-16 RX ADMIN — DEXTROSE: 10 SOLUTION INTRAVENOUS at 08:45

## 2025-05-16 RX ADMIN — PANCRELIPASE 8 CAPSULE: 60000; 12000; 38000 CAPSULE, DELAYED RELEASE PELLETS ORAL at 18:46

## 2025-05-16 RX ADMIN — SODIUM CHLORIDE, SODIUM LACTATE, POTASSIUM CHLORIDE, AND CALCIUM CHLORIDE 500 ML: .6; .31; .03; .02 INJECTION, SOLUTION INTRAVENOUS at 08:47

## 2025-05-16 RX ADMIN — DEXTROSE MONOHYDRATE 25 ML: 25 INJECTION, SOLUTION INTRAVENOUS at 07:22

## 2025-05-16 RX ADMIN — HYDROMORPHONE HYDROCHLORIDE 0.4 MG: 0.2 INJECTION, SOLUTION INTRAMUSCULAR; INTRAVENOUS; SUBCUTANEOUS at 21:18

## 2025-05-16 RX ADMIN — OXYCODONE 10 MG: 5 TABLET ORAL at 09:00

## 2025-05-16 RX ADMIN — DEXTROSE 15 G: 15 GEL ORAL at 06:54

## 2025-05-16 RX ADMIN — SENNOSIDES AND DOCUSATE SODIUM 2 TABLET: 50; 8.6 TABLET ORAL at 21:00

## 2025-05-16 RX ADMIN — METOCLOPRAMIDE 10 MG: 10 TABLET ORAL at 08:49

## 2025-05-16 RX ADMIN — HYDROMORPHONE HYDROCHLORIDE 0.4 MG: 0.2 INJECTION, SOLUTION INTRAMUSCULAR; INTRAVENOUS; SUBCUTANEOUS at 06:21

## 2025-05-16 RX ADMIN — POLYETHYLENE GLYCOL 3350 17 G: 17 POWDER, FOR SOLUTION ORAL at 11:33

## 2025-05-16 RX ADMIN — POTASSIUM CHLORIDE 20 MEQ: 1500 TABLET, EXTENDED RELEASE ORAL at 08:49

## 2025-05-16 RX ADMIN — SIMETHICONE 80 MG: 80 TABLET, CHEWABLE ORAL at 08:48

## 2025-05-16 RX ADMIN — SIMETHICONE 80 MG: 80 TABLET, CHEWABLE ORAL at 16:54

## 2025-05-16 RX ADMIN — METOCLOPRAMIDE 10 MG: 10 TABLET ORAL at 14:16

## 2025-05-16 RX ADMIN — INSULIN HUMAN 3 UNITS: 100 INJECTION, SUSPENSION SUBCUTANEOUS at 22:51

## 2025-05-16 RX ADMIN — SUCRALFATE 1 G: 1 TABLET ORAL at 22:53

## 2025-05-16 RX ADMIN — FAMOTIDINE 20 MG: 20 TABLET, FILM COATED ORAL at 22:53

## 2025-05-16 RX ADMIN — POTASSIUM CHLORIDE 20 MEQ: 1500 TABLET, EXTENDED RELEASE ORAL at 12:46

## 2025-05-16 RX ADMIN — SUCRALFATE 1 G: 1 TABLET ORAL at 08:50

## 2025-05-16 RX ADMIN — SUCRALFATE 1 G: 1 TABLET ORAL at 16:54

## 2025-05-16 RX ADMIN — HYDROMORPHONE HYDROCHLORIDE 0.4 MG: 0.2 INJECTION, SOLUTION INTRAMUSCULAR; INTRAVENOUS; SUBCUTANEOUS at 11:43

## 2025-05-16 RX ADMIN — SENNOSIDES AND DOCUSATE SODIUM 2 TABLET: 50; 8.6 TABLET ORAL at 08:49

## 2025-05-16 RX ADMIN — SUCRALFATE 1 G: 1 TABLET ORAL at 12:46

## 2025-05-16 RX ADMIN — LINACLOTIDE 145 MCG: 145 CAPSULE, GELATIN COATED ORAL at 08:51

## 2025-05-16 RX ADMIN — SIMETHICONE 80 MG: 80 TABLET, CHEWABLE ORAL at 12:47

## 2025-05-16 RX ADMIN — OXYCODONE 10 MG: 5 TABLET ORAL at 22:52

## 2025-05-16 RX ADMIN — GABAPENTIN 400 MG: 400 CAPSULE ORAL at 21:02

## 2025-05-16 RX ADMIN — OXYCODONE 10 MG: 5 TABLET ORAL at 18:45

## 2025-05-16 RX ADMIN — GABAPENTIN 400 MG: 400 CAPSULE ORAL at 14:16

## 2025-05-16 RX ADMIN — HYDROCORTISONE 10 MG: 10 TABLET ORAL at 08:53

## 2025-05-16 RX ADMIN — INSULIN ASPART 3 UNITS: 100 INJECTION, SOLUTION INTRAVENOUS; SUBCUTANEOUS at 02:21

## 2025-05-16 RX ADMIN — METOCLOPRAMIDE 10 MG: 10 TABLET ORAL at 21:02

## 2025-05-16 RX ADMIN — PANCRELIPASE 8 CAPSULE: 60000; 12000; 38000 CAPSULE, DELAYED RELEASE PELLETS ORAL at 12:47

## 2025-05-16 RX ADMIN — PANTOPRAZOLE SODIUM 40 MG: 40 TABLET, DELAYED RELEASE ORAL at 08:48

## 2025-05-16 RX ADMIN — ACETAMINOPHEN 650 MG: 325 TABLET ORAL at 08:48

## 2025-05-16 RX ADMIN — DULOXETINE 90 MG: 60 CAPSULE, DELAYED RELEASE ORAL at 08:48

## 2025-05-16 RX ADMIN — ACETAMINOPHEN 650 MG: 325 TABLET, FILM COATED ORAL at 21:01

## 2025-05-16 ASSESSMENT — ACTIVITIES OF DAILY LIVING (ADL)
ADLS_ACUITY_SCORE: 58
ADLS_ACUITY_SCORE: 58
ADLS_ACUITY_SCORE: 61
ADLS_ACUITY_SCORE: 58
ADLS_ACUITY_SCORE: 58
ADLS_ACUITY_SCORE: 61
ADLS_ACUITY_SCORE: 58
ADLS_ACUITY_SCORE: 58
ADLS_ACUITY_SCORE: 60
ADLS_ACUITY_SCORE: 58
ADLS_ACUITY_SCORE: 60
ADLS_ACUITY_SCORE: 61
ADLS_ACUITY_SCORE: 60
ADLS_ACUITY_SCORE: 58
ADLS_ACUITY_SCORE: 60
ADLS_ACUITY_SCORE: 60
ADLS_ACUITY_SCORE: 58
ADLS_ACUITY_SCORE: 58
ADLS_ACUITY_SCORE: 60
ADLS_ACUITY_SCORE: 60
DEPENDENT_IADLS:: CLEANING;LAUNDRY;SHOPPING;TRANSPORTATION;MEDICATION MANAGEMENT
ADLS_ACUITY_SCORE: 60
ADLS_ACUITY_SCORE: 58
ADLS_ACUITY_SCORE: 61

## 2025-05-16 NOTE — PLAN OF CARE
Problem: Adult Inpatient Plan of Care  Goal: Readiness for Transition of Care  Recent Flowsheet Documentation  Taken 5/16/2025 1100 by Maritza Martin BSW  Transportation Anticipated: family or friend will provide  Concerns to be Addressed: discharge planning  Intervention: Mutually Develop Transition Plan  Recent Flowsheet Documentation  Taken 5/16/2025 1100 by Maritza Martin BSW  Readmission Within the Last 30 Days: unable to assess  Transportation Anticipated: family or friend will provide  Transportation Concerns: none  Concerns to be Addressed: discharge planning  Patient/Family Anticipated Services at Transition: home health care  Patient/Family Anticipates Transition to:   home with family   home with help/services  Offered/Gave Vendor List: no  Equipment Currently Used at Home:   walker, rolling   wheelchair, manual   grab bar, toilet   grab bar, tub/shower

## 2025-05-16 NOTE — CONSULTS
"Pain Service Consultation Note  [facility]      Patient Name: Chantell Kidd  MRN: [id number]   Age: 59 year old  Sex: female  Date: May 16, 2025                                        Reviewed: Yes  Per [place]  review pulled from system on 05/16/25.  5/13/2025 05/13/2025 1 Oxycodone Hcl (Ir) 5 Mg Tablet 16.00 2   05/06/2025 05/06/2025 1 Gabapentin 400 Mg Capsule 90.00 30   05/06/2025 05/06/2025 1 Oxycodone Hcl (Ir) 5 Mg Tablet 16.00 2   04/24/2025 04/24/2025 1 Oxycodone Hcl (Ir) 5 Mg Tablet 16.00 3   04/24/2025 04/24/2025 1 Gabapentin 100 Mg Capsule 90.00 30       Pain Medications/Prescriber: Get Sahu MD     Referring Provider:  Shivani Simon PA-C   Referring Service:  medicine  Reason for Consultation: \"    acute on chronic abdominal pain, difficult to control\"        Assessment/Recommendations:  Chantell Kidd is a 59 year old female who has PMH of  insulin-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    Chantell is seeing lying in bed. NAD. AAOX3.  She states pain is located in abdomen and also in the back.  States this pain has been ongoing for 1.5 months since last admission.  She states pain is currently 8/10 but just received oxycodone dose.  Before oxycodone, pain level was 10/10.  She states that oxycodone could be \"stronger\" to decrease the pain.  PTA, she states she received short rx for oxycodone 5mg #16 at last hospital discharge on 5/6/25 and then another short rx  of oxycodone 5mg #16 at discharge follow up in clinic on  5/13/25 . She states that she was taking oxycodone 5mg, 2 tabs 4x/day prior to this admission and has \"4-6 tabs at home.\"    GI and endocrinology are following. GI recommends minimizing opioids. Note abdominal/pelvis CT on 5/15/25 showed large stool burden.  " Discussed how large stool burden can be contributing to abdominal and back pain. Discussed that opioids can cause constipation.  Discussed indications, risks and benefits of the opioids. Discussed using the lowest most effective dose for the shortest duration of time in order to minimize side effects such as constipation. She does not wish to change current opioid regimen.    Plan:     Flexeril 5-10mg PO Q 8 hours PRN.  Oxycodone 5-10mg PO Q 4 hours PRN    To taper,   oxycodone 5-10mg PO Q 4 hours PRN x 1 day then decrease to Q 6 hours PRN x 1day then to Q 8 hours PRN x 1 day then to Q 12 hours PRN x 1day then to 1/day PRN x 1 day and STOP.     Minimize IV Dilaudid 0.2-0.4mg IV Q 4 hours PRN x 24 hours.  If no new acute pathology causing pain, would discontinue.     Simethicone as needed  Lidocaine patches 1-3 patches Q 24 hours, 12 hours on and 12 hours off  Menthol patches, 1 patch TD Q 8 hours  Aggressive bowel regimen   Keep pain clinic appt. On 6/10/2025 with Dr. Kapadia.    Thank you for the opportunity to participate in the care of Chantell JOHNSON Bernabe  Pain Service will sign off.    Discussed with attending anesthesiologist  Primary Service Contacted with Recommendations? 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    TPIAT    Spasm of sphincter of Oddi          Family History:    Family History   Problem Relation Age of Onset    Hypertension Mother     Diabetes Mother     Osteoporosis Mother     Cancer Father         pancreatic cancer    Diabetes  "Maternal Grandmother     Cardiovascular Maternal Grandmother     Cancer Maternal Grandfather         lung cancer    Cancer Sister         brain    Cancer Sister         liver cancer       Social History:  Social History     Tobacco Use    Smoking status: Never    Smokeless tobacco: Never   Substance Use Topics    Alcohol use: No     Alcohol/week: 0.0 standard drinks of alcohol             Review of Systems:  Complete ROS reviewed. Unless otherwise noted, all other systems found to be negative.        Laboratory Results:  Recent Labs   Lab Test 05/16/25  0617 05/02/25  0646 05/01/25  1007 11/10/20  0608 11/09/20  0741   PROTIME  --   --  12.7  --   --    INR  --   --  0.92   < > 1.05   *   < > 587*   < >  --    PTT  --   --   --   --  25   BUN 4.1*   < > 5.5*   < > 5*    < > = values in this interval not displayed.       Allergies:  Allergies   Allergen Reactions    Corticosteroids Other (See Comments)     All oral, IV and injectable steroids are contraindicated (unless in life threatening situations) in [facility] transplant recipients. They can cause irreversible loss of islet cell function. Please contact patient's transplant care coordinator YURI Cortez RN at [phone]/pager [phone] and/or endocrinologist prior to administration.      Chocolate Flavoring Agent (Non-Screening) Rash     Breaks out when eats chocolate         Current Pain Related Medications:  Medications related to Pain Management (From now, onward)      Start     Dose/Rate Route Frequency Ordered Stop    05/16/25 1400  polyethylene glycol (MIRALAX) Packet 17 g        Note to Pharmacy: PTA Sig:Take 1 packet by mouth 2 times daily.      17 g Oral 3 TIMES DAILY 05/16/25 0804      05/16/25 0830  senna-docusate (SENOKOT-S/PERICOLACE) 8.6-50 MG per tablet 2 tablet        Placed in \"Or\" Linked Group    2 tablet Oral 2 TIMES DAILY 05/16/25 0804      05/16/25 0830  senna-docusate (SENOKOT-S/PERICOLACE) 8.6-50 MG per tablet 2 tablet      " "  Placed in \"Or\" Linked Group    2 tablet Oral 2 TIMES DAILY 05/16/25 0804      05/16/25 0830  simethicone (MYLICON) chewable tablet 80 mg         80 mg Oral 4 TIMES DAILY 05/16/25 0805 05/16/25 0800  DULoxetine (CYMBALTA) DR capsule 90 mg        Note to Pharmacy: PTA Sig:Take 1 capsule (30 mg) by mouth daily With 60mg capsule for total dose of 90mg      90 mg Oral DAILY 05/15/25 1827      05/15/25 2000  gabapentin (NEURONTIN) capsule 400 mg        Note to Pharmacy: PTA Sig:Take 1 capsule (400 mg) by mouth 3 times daily.      400 mg Oral 3 TIMES DAILY 05/15/25 1827      05/15/25 2000  topiramate (TOPAMAX) tablet 100 mg        Note to Pharmacy: PTA Sig:Take 1 tablet (100 mg) by mouth 2 times daily      100 mg Oral 2 TIMES DAILY 05/15/25 1827      05/15/25 2000  acetaminophen (TYLENOL) tablet 650 mg        Placed in \"Or\" Linked Group    650 mg Oral 3 TIMES DAILY 05/15/25 1831      05/15/25 2000  acetaminophen (TYLENOL) Suppository 650 mg        Placed in \"Or\" Linked Group    650 mg Rectal 3 TIMES DAILY 05/15/25 1831      05/15/25 1827  lidocaine 1 % 0.1-1 mL         0.1-1 mL Other EVERY 1 HOUR PRN 05/15/25 1831      05/15/25 1827  lidocaine (LMX4) cream          Topical EVERY 1 HOUR PRN 05/15/25 1831      05/15/25 1827  cyclobenzaprine (FLEXERIL) tablet 5 mg        Note to Pharmacy: PTA Sig:May take 1.5 tablets (7.5 mg) by mouth 3 times daily as needed for muscle spasms. May also take 1 tablet (5 mg) every evening as needed for muscle spasms.     Placed in \"And\" Linked Group    5 mg Oral 3 TIMES DAILY PRN 05/15/25 1827      05/15/25 1827  cyclobenzaprine (FLEXERIL) tablet 5 mg        Note to Pharmacy: PTA Sig:May take 1.5 tablets (7.5 mg) by mouth 3 times daily as needed for muscle spasms. May also take 1 tablet (5 mg) every evening as needed for muscle spasms.     Placed in \"And\" Linked Group    5 mg Oral AT BEDTIME PRN 05/15/25 1827      05/15/25 1827  oxyCODONE (ROXICODONE) tablet 5-10 mg         5-10 mg Oral " "or Feeding Tube EVERY 4 HOURS PRN 05/15/25 1827      05/15/25 1827  HYDROmorphone (DILAUDID) injection 0.4 mg         0.4 mg Intravenous EVERY 4 HOURS PRN 05/15/25 1827                Physical Exam:  Vitals: BP (!) 84/59 (BP Location: Right arm, Patient Position: Semi-Amaya's, Cuff Size: Adult Small)   Pulse 84   Temp 97.9  F (36.6  C)   Resp 16   SpO2 95%     Physical Exam:     CONSTITUTIONAL/GENERAL APPEARANCE:  NAD  EYES: EOMI, sclera anicteric, PERRLA  ENT/NECK: atraumatic, lips and oral mucous membranes dry  RESPIRATORY: non-labored breathing. No cough, wheeze  CV: HR within normal limit  ABDOMEN: Soft, non-tender, non-distended  MUSCULOSKELETAL/BACK/SPINE/EXTREMITIES: Moves all extremities purposefully.  No edema or obvious joint deformities   NEURO: Alert and Oriented x3. Answers questions appropriately  SKIN/VASCULAR EXAM:  No jaundice, no visible rashes or lesions            Acute Inpatient Pain Service [facility]  Hours of pain coverage 24/7   Please PAGE via [employer] - Link to [employer] Here - Search Pain  Page via [employer]- Please Page the Pain Team Via Amcom: \"PAIN MANAGEMENT ACUTE INPATIENT/ [facility]\"           "

## 2025-05-16 NOTE — CONSULTS
Inpatient Diabetes Management Service : New Consult Note     Patient: Chantell Kidd   YOB: 1965    MRN: [id number]     Date of Consult : 05/16/2025   Date of Admission: 5/15/2025     Reason for Consult: T1 DM s/p TPAIT, poor control    Consult Requestor: Shivani Simon PA-C            History of Present Illness:   Chantell Kidd is a 59 year old female admitted on 5/15/2025. 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  Inpatient Diabetes Service consulted for hyperglycemia and then hypoglycemia..     History obtained via the patient, chart review and discussion with primary team.       Additional Historian:  none  Diabetes Focus:    Chantell Kidd is  known to the Inpatient Diabetes Service from past admission(s).  We last saw her during her 4/30/2025 admission. She present with LE edema and abdominal pain then found to have hyperglycemia. When she was discharged she was discharged on Lantus 4 units at 6 pm.  Take 1 unit of Novolog for 14g of carb with meals and snacks and 1/50 starting at 175. She reports that the last 2 days her BG have been very high and that her meter just reads high.She reports taking 4 units of Lantus.  She says the Lantus 18 units bid with 1/12 carb coverage and NPH 8 units when she was in [place] over the winter. She does take 10 units of Novolog if the meter reads high. She was not taking this recently although the last diabetes Ed  on 5/13/2025 note says she was. I think that Chantell gets things mixed up because went I went back to ask her more questions she did start getting things mixed up.  Called spouse at home, he does not know how much insulin she takes.  He frequently reminds her to take it though.  He cannot find piece of paper she says she has with the insulin dose written  on it.    Last dose insulin PTA: on Wednesday, took lantus   Current inpatient regimen:  lantus 18 units bid, first dose=, NPH=8 units, low sliding scale and 1/12 which now is on hold    BG at time of consult: 38  Planned Procedures/Surgeries: npo for imaging    Relevant Labs on Admission:  if contributory, otherwise see labs below  Renal function: Creatinine (0.41), eGFR (>90)    BMP: Na (137), K (4.0) Bicarb (28), Anion Gap (15), Glucose (571)  BhB: <0.18   Lactic Acid: none         Inpatient Glucose Trends:             Diabetes History:   Diabetes Type and Duration: Pancreatogenic diabetes s/p total pancreatectomy and islet autotransplant with insulin dependence since 1/2012  GAD65 antibody, zinc transporter 8 antibody, islet antibody, insulin antibody not available on epic search.     Numerous C-peptides:  Component      Latest Ref Rng 8/28/2018  6:47 AM 9/3/2018  6:41 PM 9/6/2018  8:00 AM 9/7/2018  6:55 AM 4/18/2019  11:04 AM 4/3/2025  2:01 PM   C-Peptide      0.9 - 6.9 ng/mL 0.3 (L)  0.2 (L)  1.8  2.8  1.8  0.5 (L)    Patient Fasting?           Yes      PTA Medication Regimen:   She says she was taking what her sheet said that she given on last discharge:  She was discharged on:    Long-acting insulin: glargine (Lantus) - take 4 units once daily at 6pm. Take at same time each day.     2) Rapid-acting insulin: aspart (Novolog) carbohydrate coverage/mealtime insulin - take 1 unit per 14 grams of carbohydrates before meals and snacks.     3) Rapid-acting insulin: aspart (Novolog) correction - see chart below. Take for high blood glucoses three times daily before meals when eating (or every 4-6 hours when receiving tube feeding) and at bedtime.  You may add the correction dose to the carbohydrate coverage/mealtime insulin dose and give in one injection--ideally 10-15 minutes before a meal.  You may take the correction dose even if you skip a meal (as long as it has been 4 hours since previous correction dose).     "  Blood Glucose Insulin Before Meals When Eating or every 4-6 hours when receiving tube feeding:   Less than 175 0 units   175 -224  1 units   225 - 274 2 units   275 - 324 3 units   325 - 374 4 units    375 - 424  5 units   425 - 474 6 units   475 or more 7 units         4) Intermediate-acting insulin: Novolin N (NPH). Take to cover tube feeds (dosed for Sagrario Marinelli at 45 ml/hr)   - Take Novolin N (NPH) 4 units at 9AM   - Take Novolin N (NPH) 3 units at 9PM      (If your rate of tube feed were to decrease, you can reference the list below)  If TF rate at 20 ml per hour, give 1 units  If TF rate at 30 ml per hour, give 3 units  If TF rate from 40-45 ml/hr, give 5 units  Missing doses?: denies  Historical Diabetes Medications: MDI, insulin pumps     When she was in [place] during the winter and was discharged on TF:   - Lantus 18 units AM, 12 units PM (Covering TF)   - Novolog ICR 1:16  - Novolog correction 1:35 (more intuitive dosing      Glucose monitoring device/frequency/trends: Dexcom was put on her on 5/13/2025 by diabetes ed      Her glucose meter(checking 4-6 times daily) running 400 to \"high\" for the past 3 days since she has been sick.   ------------------------  + peripheral neuropathy (numbness, pain in feet, altered sensation), denies retinopathy (last eye exam: 2024 will need to re-estabish in [place]), denies nephropathy, unclear if gastroparesis (Sub-optimal study in March 2016 showed 100% retention at 1 hour and then rapid emptying), denies macrovascular disease     Most recent A1c: 15.8 on 4/30/3035    Hemoglobin at time of most recent A1c: 10.5  RBC transfusion 3 months prior to most recent A1c:  none      History of DKA: yes - in past 6 months in [place] (2024)      Safety Plan:   - Glucagon: +GVOKE     Diet/Lifestyle/Living Situation: does not eat well but has been doing her tube feeds until when she developed abdominal pain    Ability to [place] Prescribed Regimen:  not sure she can do this.  She really " can't remember things well             Medications Impacting Glycemia:    Steroids: TA hydrocortisone 10 mg am, 15 mg HS (adrenal insufficiency)    D5W containing solutions/medications: d10 at 25 ml/hour, titrate to keep over 110  Other medications impacting glucose: none         Diet/Nutrition:    Orders Placed This Encounter      NPO for Procedure/Surgery per Anesthesia Guidelines Except for: Meds; Clear liquids before procedure/surgery: ADULT (Age GREATER than or Equal to 18 years) - Clear liquids 2 hours before procedure/surgery     Supplements:  none  TF: Was on KF TF running at 45 ml and continuous prior to admission.  TPN: none          Review of Systems:    CC:   Constitutional: yes fever and chills.   Cardiac: yes chest pain, no palpitations,    Respiratory: yes dyspnea on exertion and at rest.    GI: yes ,abdominal pain, tenderness and bloating. yes nausea and vomiting. No  diarrhea.    : no difficulty urinating, dysuria, and incontinence.       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    TPIAT    Spasm of sphincter of Oddi              Past Surgical History:      Past Surgical History:   Procedure Laterality Date    ARTHROPLASTY CARPOMETACARPAL (THUMB JOINT)  05/02/2014    Procedure: ARTHROPLASTY CARPOMETACARPAL (THUMB JOINT);  Surgeon: Carina Panda MD;  Location:  OR    CHOLECYSTECTOMY  01/01/2004    COLONOSCOPY  07/18/2014    Procedure: COLONOSCOPY;  Surgeon: Aurora Sahu MD;  Location:  GI    COLONOSCOPY  N/A 08/01/2017    Procedure: COLONOSCOPY;  Colonoscopy and upper endoscopy;  Surgeon: Deirdre Harris MD;  [facility]    ENDOSCOPIC INSERTION TUBE JEJUNOSTOMY N/A 5/2/2025    Procedure: Esophagogastroduodenoscopy, Jejunopexy, JEJUNOSTOMY TUBE PLACEMENT, GASTROSTOMY TUBE EXCHANGE;  Surgeon: Jose Francisco Perdomo MD;  [facility]    ENDOSCOPIC RETROGRADE CHOLANGIOPANCREATOGRAM      ENDOSCOPIC RETROGRADE CHOLANGIOPANCREATOGRAM  04/19/2011    Procedure:ENDOSCOPIC RETROGRADE CHOLANGIOPANCREATOGRAM; Pancreatic Stent Placement      ENDOSCOPIC RETROGRADE CHOLANGIOPANCREATOGRAM  05/26/2011    Procedure:ENDOSCOPIC RETROGRADE CHOLANGIOPANCREATOGRAM; with Pancreatic Stent Removal; Surgeon:DALE MIMS; [facility]    ENDOSCOPY UPPER, COLONOSCOPY, COMBINED  04/25/2012    Procedure:COMBINED ENDOSCOPY UPPER, COLONOSCOPY; Enteroscopy with Bile Duct Stent Removal, Colonoscopy  *[facility] Safe Room*; Surgeon:GRACY GODWINIQ; [facility]    ESOPHAGOSCOPY, GASTROSCOPY, DUODENOSCOPY (EGD), COMBINED  05/26/2011    Procedure:COMBINED ESOPHAGOSCOPY, GASTROSCOPY, DUODENOSCOPY (EGD); Surgeon:DALE MIMS; [facility]    ESOPHAGOSCOPY, GASTROSCOPY, DUODENOSCOPY (EGD), COMBINED N/A 10/30/2014    Procedure: COMBINED ESOPHAGOSCOPY, GASTROSCOPY, DUODENOSCOPY (EGD), BIOPSY SINGLE OR MULTIPLE;  Surgeon: Sarai Moon MD;  [facility]    ESOPHAGOSCOPY, GASTROSCOPY, DUODENOSCOPY (EGD), COMBINED Left 07/06/2015    Procedure: COMBINED ESOPHAGOSCOPY, GASTROSCOPY, DUODENOSCOPY (EGD), BIOPSY SINGLE OR MULTIPLE;  Surgeon: Thomas Estrada MD;  [facility]    ESOPHAGOSCOPY, GASTROSCOPY, DUODENOSCOPY (EGD), COMBINED N/A 07/08/2016    Procedure: COMBINED ESOPHAGOSCOPY, GASTROSCOPY, DUODENOSCOPY (EGD), BIOPSY SINGLE OR MULTIPLE;  Surgeon: Eloy Klein MD;  [facility]    ESOPHAGOSCOPY, GASTROSCOPY, DUODENOSCOPY (EGD), COMBINED N/A 08/04/2016    Procedure: COMBINED  ESOPHAGOSCOPY, GASTROSCOPY, DUODENOSCOPY (EGD), BIOPSY SINGLE OR MULTIPLE;  Surgeon: Jason Brown MD;  [facility]    ESOPHAGOSCOPY, GASTROSCOPY, DUODENOSCOPY (EGD), COMBINED N/A 08/01/2017    Procedure: COMBINED ESOPHAGOSCOPY, GASTROSCOPY, DUODENOSCOPY (EGD);;  Surgeon: Deirdre Harris MD;  [facility]    ESOPHAGOSCOPY, GASTROSCOPY, DUODENOSCOPY (EGD), COMBINED N/A 06/12/2019    Procedure: ESOPHAGOGASTRODUODENOSCOPY (EGD);  Surgeon: Jose Francisco Perdomo MD;  [facility]    GYN SURGERY      Hysterectomy and USO    [facility] ENDOSCOPY W EUS  07/20/2011    Procedure:COMBINED ENDOSCOPIC ULTRASOUND, ESOPHAGOSCOPY, GASTROSCOPY, DUODENOSCOPY (EGD); Surgeon:DARVIN DONOHUE; [facility]    HERNIORRHAPHY VENTRAL N/A 09/15/2016    Procedure: HERNIORRHAPHY VENTRAL;  Surgeon: Juanita Bernabe MD;  [facility]    HYSTERECTOMY  1997 or 1998    USO    INCISION AND DRAINAGE ABDOMEN WASHOUT, COMBINED  08/16/2012    Procedure: COMBINED INCISION AND DRAINAGE ABDOMEN WASHOUT;  ,debridement and Drainage Post Appendectomy;  Surgeon: Ron Austin MD;  [facility]    INJECT TRANSVERSUS ABDOMINIS PLANE (TAP) BLOCK BILATERAL Bilateral 05/26/2016    Procedure: INJECT TRANSVERSUS ABDOMINIS PLANE (TAP) BLOCK BILATERAL;  Surgeon: Leonard Mccallum MD;  [facility]    IR NG TUBE PLACEMENT REQ RAD & FLUORO  12/04/2017    LAPAROSCOPIC APPENDECTOMY  07/30/2012    Procedure: LAPAROSCOPIC APPENDECTOMY;  Open Appendectomy;  Surgeon: Ron Austin MD;  [facility]    PANCREATECTOMY, TRANSPLANT AUTO ISLET CELL, COMBINED  01/06/2012    Procedure:COMBINED PANCREATECTOMY, TRANSPLANT AUTO ISLET CELL; Total  Pancreatectomy, Auto Islet Transplant, splenectomy, 18fr. transgastric-jejunal feeding tube placement, liver biopsy; Surgeon:PALAK LEE; [facility]    PICC DOUBLE LUMEN PLACEMENT Right 04/19/2025    5FR DL PICC, brachial medial vein. L-38cm, 3cm out.    PICC  INSERTION - DOUBLE LUMEN Right 04/30/2025    39-3cm, Medial brachial vein    REPLACE GASTROSTOMY TUBE, PERCUTANEOUS N/A 08/30/2017    Procedure: REPLACE GASTROSTOMY TUBE, PERCUTANEOUS;  GJ Tube Change;  Surgeon: Jose Nath PA-C;  [facility]    SPLENECTOMY               Social History:      Social History     Tobacco Use    Smoking status: Never    Smokeless tobacco: Never   Substance Use Topics    Alcohol use: No     Alcohol/week: 0.0 standard drinks of alcohol        History   Sexual Activity    Sexual activity: Yes             Family History:    Family History of Diabetes: as below    Family History   Problem Relation Age of Onset    Hypertension Mother     Diabetes Mother     Osteoporosis Mother     Cancer Father         pancreatic cancer    Diabetes Maternal Grandmother     Cardiovascular Maternal Grandmother     Cancer Maternal Grandfather         lung cancer    Cancer Sister         brain    Cancer Sister         liver cancer               Physical Exam:    BP (!) 84/59 (BP Location: Right arm, Patient Position: Semi-Amaya's, Cuff Size: Adult Small)   Pulse 84   Temp 97.9  F (36.6  C)   Resp 16   SpO2 95%    General:   in no acute distress. Very thin.  HEENT:  NC/AT.   Lungs: Remains on room air with no work of breathing  ABD:  rounded, slt tender and firmer  Skin:  warm and dry,  MSK:   moving all extremities  Lymp:   + LE edema  Mental Status:  Alert and oriented x3  Psych:   Cooperative, good eye contact, full/appropriate affect  Feet:    cool,  without discoloration, without evidence of wounds, corns, calluses, or vascular compromise, pulses +,              Laboratory Data:      Lab Results   Component Value Date    A1C 15.8 (H) 04/30/2025    A1C 19.1 (H) 04/15/2025    A1C 18.9 (H) 04/03/2025    A1C 17.1 (H) 01/23/2025    A1C 11.1 (H) 03/02/2023    A1C 7.3 (H) 11/09/2020    A1C 6.7 (A) 11/21/2019    A1C 8.2 (H) 06/11/2019    A1C Canceled, Test credited 06/10/2019    A1C 9.6 (H)  04/18/2019     Recent Labs   Lab Test 05/16/25  0617   WBC 10.8   RBC 3.60*   HGB 11.6*   HCT 33.5*   MCV 93   MCH 32.2   MCHC 34.6   RDW 12.8   *     Recent Labs   Lab Test 05/16/25  0757 05/16/25  0731 05/16/25  0650 05/16/25  0617 05/15/25  1332 05/15/25  1054   NA  --   --   --  139  --  137   POTASSIUM  --   --   --  3.3*  --  4.0   CHLORIDE  --   --   --  101  --  94*   CO2  --   --   --  28  --  28   ANIONGAP  --   --   --  10  --  15   * 182*   < > 55*   < > 571*   BUN  --   --   --  4.1*  --  3.6*   CR  --   --   --  0.43*  --  0.41*   ELIZA  --   --   --  9.0  --  9.3    < > = values in this interval not displayed.     Recent Labs   Lab Test 05/16/25  0617   PROTTOTAL 6.4   ALBUMIN 3.4*   BILITOTAL 0.2   ALKPHOS 126   AST 45   ALT 55*            Assessment and Recommendations:       Post-pancreatectomy diabetes s/p TPIAT 1/2012 treated as Type 1 Diabetes Mellitus complicated by peripheral neuropathy, hypoglycemia unawareness, and hyperglycemia. Poor control  (A1c 15.8 %  4/30/2025, Hgb: 10.5)  Enteral Feed/Steroid induced Hyperglycemia  Abdominal pain     Plan/Recommendations:    - Give lantus 4 units every 24 hours at 21:00 on 5/16/2025  -Hold NPH 4 units at 9 am and   -Give NPH 3 units at 9 pm to cover KF TF running at 45 ml/hour and  or greater.  -  Start IV insulin drip if BGs >300 x 2 checks.   - Novolog Meal Coverage: 1 units per 14 g CHO, TID AC and 1:15  PRN with snacks/supplements when she is no longer NPO  - BG monitoring:  every 4 hours while NPO.  - Continue low correction scale ()  q 4 hour.  - prn D10 10-50 .If Blood glucose is 100 or less, start D10 at 10 ml and titrate up to get  or more.  Stop D10 if blood glucose is 150 or more    - Hypoglycemia protocol    - Carb counting protocol   Discussion:     Chantell said this morning that she takes what the sheet says that we gave her when she was last admitted.  She says lantus 4 units sounds right.  She says the  lantus 18 units bid was over the winter when she was in [place].  She says BG running high for the last 2-3 days since she developed the abdominal pain. She is not in DKA.  Her BG was down to 38 this morning due to likely too much lantus given last night. Will continue  D10 until her BG are greater then 150 on TF.  Discharge Planning: (tentative)    Medications: TBD    Test Claims: TBD none needed.   Education:  Needs to be assessed closer to discharge.    Outpatient Follow-up:  recommend [facility]: Libby Jay RN and 8/21/25 with Dr. Maher     Thank you for this consult. IDS will continue to follow.      Please notify Inpatient Diabetes Service if changes are planned to steroids, nutrition, TPN/TF and anticipated procedures requiring prolonged NPO status.     Samantha Pavon PA-C  Inpatient Diabetes Service  [phone]  Available by [employer]  Date of Service:  5/16/2025      To contact Inpatient Diabetes Service:     7 AM - 5 PM: Page the IDS DAVID following the patient that day (see filed or incomplete progress notes/consult notes under Endocrinology)    OR if uncertain of provider assignment: page job code [phone]    5 PM - 7 AM: First call after hours is to primary service.    For urgent after-hours questions, page job code for on call fellow: [phone]     I spent a total of 80 minutes on the date of the encounter doing prep/post-work, chart review, history and exam, documentation and further activities per the note including lab review, multidisciplinary communication, counseling the patient and/or coordinating care regarding acute hyper/hypoglycemic management, as well as discharge management and planning/communication.  See note for details.

## 2025-05-16 NOTE — PLAN OF CARE
5 ortho Admission Note    Reason for admission: Abdominal pain   Primary team notified of pt arrival.  Admitted from: [facility]  Via: Ambulance   Accompanied by: Self   Belongings: Placed in closet; valuables sent home with family  Admission Required Doc Completed: Yes  Mobility Devices Utilized by Patient Provided (i.e. walker, wheelchair, etc.): Yes  Teaching: Orientation to unit and call light- call light within reach, use of console, meal times, when to call for the RN, and enforced importance of safety.  IV Access: R PIV  Telemetry: No  Ht./Wt.: Completed  Code Status verified on armband: Yes  2 RN Skin Assessment Completed with: Dontrell DE JESUS RN  Suction/Ambu bag/Flowmeter at bedside: Yes    Pt status:     Temp:  [97.5  F (36.4  C)-99  F (37.2  C)] 97.5  F (36.4  C)  Pulse:  [] 81  Resp:  [16] 16  BP: (109-138)/(73-84) 109/75  SpO2:  [96 %-99 %] 97 %    Goal Outcome Evaluation:      Plan of Care Reviewed With: patient    Overall Patient Progress: no changeOverall Patient Progress: no change       VS: BP (!) 78/50 (BP Location: Left arm, Patient Position: Semi-Amaya's, Cuff Size: Adult Small)   Pulse 84   Temp 97.9  F (36.6  C)   Resp 16   SpO2 95%     O2: > 92% on RA, denies SOB and CP   Output: Voids spontaneously and adequately    Last BM:    Activity: Ax1 with walker and gait belt    Skin: Scattered bruising to BUE   Pain: Reports abdominal pain   CMS: AO x4, denies numbness and tingling    Dressing: None    Diet: NPO   LDA: R PIV SL   Equipment: IV pole, call light and personal belongings    Plan: Continue POC   Additional Info: At 0700 the NST showed the writer the patients blood sugar was 43 since the patient was NPO, we did glucose gel because the pyxis didn't have D50. After gel was given the blood sugar was taken again and it dropped to 38. Another RN was able to retrieved D50 it was given and the blood sugar were back to above 100. The pt was also hypotensive and the doctor was paged.

## 2025-05-16 NOTE — PROGRESS NOTES
[facility]    Medicine Progress Note - Hospitalist Service       Date of Admission:  5/15/2025  Assessment & Plan     Chantell Kidd is a 59 year old female admitted on 5/15/2025. She has PMH of insulin-dependent diabetes mellitus after pancreatectomy, chronic pancreatitis, migraine, hypothyroidism, and venessa-en-Y with G tube nutrition who presented due to abdominal and back pain, will be admitted to medicine team for management.    Today's changes: 5/16/2025    Hypoglycemia in setting of n.p.o. status, holding tube feeding.  Initiated on hypoglycemia protocol.  Keep on D10 while npo for US abd. Resume TF post US.  Nutrition consultation.  LFT improving.  Endocrinology consultation  Follow-up right upper quadrant ultrasound.  History of hepatic steatosis.  Severe constipation: Aggressive bowel regimen-increase MiraLAX, senna Colace.  Suppository as needed, enema as needed.  Follow-up GI consultation  Follow-up pain consultation.  Minimize narcotic use as able.    Dw patient. RN.        Acute on chronic abdominal pain   Transaminitis  Hx Venessa-en-Y (2012) s/p PEG for nutrition  S/p cholecystectomy (2004)  Patient has had multiple recent admissions due to abdominal pain and issues with PEG tube. Admitted 4/16-2/24 as well as 4/30-5/6 with PEG-J repositioned/replaced by GI on 4/17 as well as 5/2. She presented again 5/15 due to acute worsening of abdominal pain, worse with TF though she has been able to continue them. Reports fever x1 earlier in the week and has since been using Tylenol without additional fever. Note she has been using Tylenol 1000mg q6 for the last month. Vitals on admit with mild tachycardia to 103, temp 37.2, BP WNL. Overall improved after IVF administration and pain control. CT AP w/o on admit with no acute abdominal findings, GJ in place. Labs concerning for mildly elevated LFTs with , ALT 89, Alk phos 152. Tbili 0.3. WBC WNL. Patient feels unable to  adequately manage abdominal pain at home at this time; previously required IP Pain team assistance with plan to establish OP in the coming months.   -- GI consulted, appreciate recommendations              > No urgent need for procedures at this time,              > Hepatic US ordered- follow-up.   -- Trend CMP daily  -- Pain plan:               > Pain team consulted, appreciate recommendations              > Acetaminophen 650mg PO TID              > Flexeril 5 mg PO TID PRN + 5mg at bedtime PRN               > Oxycodone 5-10 mg PO q4h PRN              > Gabapentin 400 mg TID (recently increased)              > Dilaudid 0.4mg IV q4 PRN for breakthrough              > Kpad              > Icy hot PRN  -- Continue PTA Creon 96,000U TID   -- Continue PTA famotidine, protonix, Linzess, reglan and sucralfate  -- Zofran PRN for nausea     Follow-up Pain Mx consultation      C/f recent ANGELINA, improved  Patient had labs with PCP during hospital follow up appointment on 5/13 during which her Cr was 3.05, up from her baseline of ~0.4. She presented on 5/15 to the ED with abdominal pain that radiates to the back. Also complaining of increasing urinary frequency and mild dysuria. Cr on admit back to baseline of 0.41. UA negative for signs of infection, though with significant glucosuria which may be contributing to symptoms. Question outpatient lab accuracy.  -- Daily BMP  -- Avoid nephrotoxins as able  -- I/O     Post-pancreatectomy diabetes (Type 1)  Chronic Pancreatitis and Sphincter of Oddi dysfunction s/p TPAIT (1/2012)  Patient has poorly controlled diabetes with multiple presentations with glucose >700. Today, presented with BG of 571. Most recent HbA1c of 15.8 4/30/25. No anion gap, Ketones <0.18. In the ED, she was given 1L IVF and glucose improved to 354. Has previously been seen by inpatient endocrinology team with concern for patient not using insulin at home, though she reports taking her insulin as directed prior to  admission. Most recent OP Endo visit was on 5/13. Home regimen includes Lantus 18U qAM and at bedtime, NPH 8U qAM, and Novolog sliding scale, and ICR 1U:12CHO with meals. Most readings at home are significantly elevated and this has been a target of outpatient management.   -- Endocrinology consulted, appreciate recommendations  -- Continue PTA Lantus 18U BID (qAM and at bedtime)   -- Continue PTA NPH 8U qAM  -- Continue PTA Novolog meal coverage: 1 Unit per 12 grams CHO, TID AC and 1 Unit per 15 grams CHO with snacks/supplements  -- MSSI   -- Continue PTA Creon 96,000U TID with meals    -- Glucose checks ACHS  -- Hypoglycemia protocol     BLE Edema  Has been ongoing for several months, she notes being started on Lasix 20mg daily and this was increased to 40mg BID by provider in [place] in the fall. She had BLE US during most recent admission 4/30/25 that was negative for DVT and confirmed soft tissue edema bilaterally. Over the last couple weeks, left leg is slightly more swollen than the right. No history of injury, fracture, or surgery on this leg before. No erythema or wounds.  -- BLE Duplex to eval for DVT  -- Continue PTA Lasix 40mg BID  -- GAIL hose during the day     Malnutrition   Cachexia   GJ in place with continuous TF at 45ml/hour. Reports she has been able to continue at this rate despite abdominal discomfort.   -- Nutrition consulted              > Continue Nutren 1.5 @ 40 ml/hr, will hold at midnight for hepatic US     History of adrenal insufficiency  Followed by Dr. Maher. Previously suppressed AM cortisol and has been on empiric therapy for possible adrenal insufficiency, unclear if central vs transient. Had previously been unable to wean steroids due to hypoglycemia episodes. Most recently saw Dr Maher 4/3/25 and goal is to retest in the near future with low dose ACTH stimulation testing.  -- Continue PTA hydrocortisone 10mg in the AM and 15mg in the PM   -- Encourage Endocrinology follow up on  discharge for repeat low dose ACTH stimulation testing      Hypothyroidism  -- Continue PTA levothyroxine      MDD, Anxiety  Insomnia  -- Continue PTA duloxetine, trazodone, topiramate     Diet: Regular Diet Adult  Adult Formula Drip Feeding: Continuous [place] Peptide 1.5; Jejunostomy; Goal Rate: 45 ml/hr. Please see Relizorb orders. Please abide by 8 hr hang time for open systems; mL/hr    DVT Prophylaxis: Pneumatic Compression Devices  [place]kee Catheter: Not present  Code Status: Full Code      Disposition Plan     Expected Discharge Date: 05/19/2025      Destination: home with family;home with help/services       Entered: Sean Benítez MD 05/16/2025, 6:48 PM         Sean Benítez MD  Hospitalist Service  [facility]    ______________________________________________________________________    Interval History     See above.  Ongoing pain abdomen, diffuse, more epigastrium.  No nausea.  No vomiting.  No fever.  No urinary complaint.  No BM today.  Had BM yesterday.  Currently n.p.o. for ultrasound abdomen      Data reviewed today: I reviewed all medications, new labs and imaging results over the last 24 hours.    Physical Exam   Vital Signs: Temp: 98.2  F (36.8  C) Temp src: Oral BP: 105/71 Pulse: 87   Resp: 16 SpO2: 97 % O2 Device: None (Room air)    Weight: 0 lbs 0 oz    General Appearance: Awake, interactive, NAD  HEENT: AT/NC, Anicteric, Moist MM  Respiratory: Normal work of breathing. CTA BL.   Cardiovascular: S1 S2 Regular.  GI/Abd: Soft. Diffuse ttp mostly upper abdomen. PEG/J tube + no g.r  Extremities: Distally wwp.   Neuro: AO x 4, Grossly non focal.   Skin: No acute rash on exposed areas.   Psychiatry: Stable mood.     Data   Recent Labs   Lab 05/16/25  1819 05/16/25  1708 05/16/25  1254 05/16/25  1132 05/16/25  0650 05/16/25  0617 05/15/25  1332 05/15/25  1054 05/13/25  1213   WBC  --   --   --   --   --  10.8  --  8.0 10.7   HGB  --   --   --   --   --   11.6*  --  12.3 11.9   MCV  --   --   --   --   --  93  --  94 91   PLT  --   --   --   --   --  636*  --  699* 674*   NA  --   --   --   --   --  139  --  137 131*   POTASSIUM  --  4.8  --   --   --  3.3*  --  4.0 4.1   CHLORIDE  --   --   --   --   --  101  --  94* 93*   CO2  --   --   --   --   --  28  --  28 23   BUN  --   --   --   --   --  4.1*  --  3.6* 19.5   CR  --   --   --   --   --  0.43*  --  0.41* 3.05*   ANIONGAP  --   --   --   --   --  10  --  15 15   ELIZA  --   --   --   --   --  9.0  --  9.3 9.2   *  --  145* 81   < > 55*   < > 571* 410*   ALBUMIN  --   --   --   --   --  3.4*  --  4.0 4.0   PROTTOTAL  --   --   --   --   --  6.4  --  7.0 6.9   BILITOTAL  --   --   --   --   --  0.2  --  0.3 0.2   ALKPHOS  --   --   --   --   --  126  --  152* 143   ALT  --   --   --   --   --  55*  --  89* 41   AST  --   --   --   --   --  45  --  227* 24    < > = values in this interval not displayed.       Imaging results reviewed over the past 24 hrs:   Recent Results (from the past 24 hours)   US Abdomen Limited    Narrative    EXAMINATION: Limited Abdominal Ultrasound, 5/16/2025 10:47 AM     COMPARISON: US ABDOMEN LIMITED WITH DOPPLER COMPLETE 7/7/2016    HISTORY: Assess for hepatic steatosis, cirrhosis    FINDINGS:     Fluid:   No evidence of ascites or pleural effusions.    Liver:   Coarse heterogeneous echotexture with mild increased echogenicity.  Scattered echogenic foci with ringdown artifact, compatible with  central pneumobilia seen on CT 5/15/2025.  No focal mass. Main portal  vein patent with antegrade flow.  Liver measures 13.7 cm craniocaudal.       Gallbladder:   There is no wall thickening, pericholecystic fluid, positive  sonographic Lopez's sign or evidence for cholelithiasis.    Bile Ducts:   Both the intra- and extrahepatic biliary system are of normal caliber.     The common bile duct measures 5 mm in diameter.    Pancreas:   Pancreas is surgically absent.    Kidney:   The right  kidney measures 11.8 cm long.  There is no hydronephrosis or hydroureter, no shadowing renal calculi,  cystic lesion or mass.     Partially visualized IVC is patent.  Aorta measures 2.5 cm in AP dimension.      Impression    IMPRESSION:   1.  Coarse hepatic echotexture and increased echogenicity suggestive  of steatosis and chronic parenchymal liver disease.    2.  Findings compatible with central pneumobilia, stable compared to  CT 5/15/2025.      I have personally reviewed the examination and initial interpretation  and I agree with the findings.    MILLY BECERRA DO         SYSTEM ID:  [id number]     Medications   Current Facility-Administered Medications   Medication Dose Route Frequency Provider Last Rate Last Admin    dextrose 10% infusion   Intravenous Continuous Samantha Pavon PA-C   Stopped at 05/16/25 1733     Current Facility-Administered Medications   Medication Dose Route Frequency Provider Last Rate Last Admin    acetaminophen (TYLENOL) tablet 650 mg  650 mg Oral or Feeding Tube TID Braxton Banuelos MD        Or    acetaminophen (TYLENOL) Suppository 650 mg  650 mg Rectal TID Braxton Banuelos MD        DULoxetine (CYMBALTA) DR capsule 90 mg  90 mg Oral Daily Shivani Simon PA-C   90 mg at 05/16/25 0848    famotidine (PEPCID) tablet 20 mg  20 mg Oral or Feeding Tube At Bedtime Braxton Banuelos MD        furosemide (LASIX) tablet 40 mg  40 mg Oral or Feeding Tube BID Braxton Banuelos MD   40 mg at 05/16/25 1654    gabapentin (NEURONTIN) capsule 400 mg  400 mg Oral TID Shivani Simon PA-C   400 mg at 05/16/25 1416    hydrocortisone (CORTEF) tablet 10 mg  10 mg Oral QAM Shivani Simon PA-C   10 mg at 05/16/25 0853    hydrocortisone (CORTEF) tablet 15 mg  15 mg Oral QPM Shivani Simon PA-C   15 mg at 05/15/25 2008    insulin aspart (NovoLOG) injection (RAPID ACTING)  1-4 Units Subcutaneous Q4H Mahsa Jose APRN CNP        insulin aspart (NovoLOG) injection (RAPID ACTING)   Subcutaneous TID w/meals Nick, Samantha  JUNO Thomson   6 Units at 05/16/25 1650    insulin glargine (LANTUS PEN) injection 4 Units  4 Units Subcutaneous Q24H Samantha Pavon PA-C        insulin NPH injection 3 Units  3 Units Subcutaneous Q24H Samantha Pavon PA-C        levothyroxine (SYNTHROID/LEVOTHROID) tablet 125 mcg  125 mcg Oral QAM  Shivani Simon PA-C   125 mcg at 05/16/25 0849    linaclotide (LINZESS) capsule 145 mcg  145 mcg Oral QAM  Shivani Simon PA-C   145 mcg at 05/16/25 0851    lipase-protease-amylase (CREON 12) 07014-23875-89384 units per capsule 8 capsule  8 capsule Oral TID w/meals Shivani Simon PA-C   8 capsule at 05/16/25 1846    metoclopramide (REGLAN) tablet 10 mg  10 mg Oral TID Shivani Simon PA-C   10 mg at 05/16/25 1416    pantoprazole (PROTONIX) EC tablet 40 mg  40 mg Oral BID Shivani Simon PA-C   40 mg at 05/16/25 0848    polyethylene glycol (MIRALAX) Packet 17 g  17 g Oral or Feeding Tube TID Braxton Banuelos MD        potassium chloride minerva ER (KLOR-CON M20) CR tablet 20 mEq  20 mEq Oral Daily Shivani Simon PA-C   20 mEq at 05/16/25 0849    senna-docusate (SENOKOT-S/PERICOLACE) 8.6-50 MG per tablet 2 tablet  2 tablet Oral or Feeding Tube BID Braxton Banuelos MD        Or    senna-docusate (SENOKOT-S/PERICOLACE) 8.6-50 MG per tablet 2 tablet  2 tablet Oral or Feeding Tube BID Braxton Banuelos MD        simethicone (MYLICON) chewable tablet 80 mg  80 mg Oral 4x Daily Sean Benítez MD   80 mg at 05/16/25 1654    sodium chloride (PF) 0.9% PF flush 3 mL  3 mL Intracatheter Q8H [facility] Shivani Simon PA-C   3 mL at 05/15/25 2155    sucralfate (CARAFATE) tablet 1 g  1 g Oral 4x Daily  &  Shivani Simon PA-C   1 g at 05/16/25 1654    [START ON 5/17/2025] thiamine (B-1) tablet 100 mg  100 mg Oral or Feeding Tube Daily Braxton Banuelos MD        topiramate (TOPAMAX) tablet 100 mg  100 mg Oral or Feeding Tube BID Braxton Banuelos MD        traZODone (DESYREL) tablet 200 mg  200 mg Oral or Feeding Tube QPM Braxton Banuelos MD

## 2025-05-16 NOTE — PHARMACY-CONSULT NOTE
Pharmacy Tube Feeding Consult    Medication reviewed for administration by feeding tube and for potential food/drug interactions.    Recommendation: Recommend changing the following medications to a liquid dosage form if patient unable to take oral medications: pantoprazole, potassium chloride, and duloxetine    Pharmacy will continue to follow as new medications are ordered.     Ludwig Boss, AlbinaD

## 2025-05-16 NOTE — CONSULTS
Care Management Initial Consult    General Information  Assessment completed with: Patient,    Type of CM/SW Visit: Initial Assessment    Primary Care Provider verified and updated as needed: Yes   Readmission within the last 30 days: unable to assess      Reason for Consult:  (elevated risk score)  Advance Care Planning: Advance Care Planning Reviewed: no concerns identified          Communication Assessment  Patient's communication style: spoken language (English or Bilingual)    Hearing Difficulty or Deaf: no   Wear Glasses or Blind: yes    Cognitive  Cognitive/Neuro/Behavioral: WDL                      Living Environment:   People in home: spouse     Current living Arrangements: house      Able to return to prior arrangements: yes       Family/Social Support:  Care provided by: spouse/significant other, self  Provides care for: no one, unable/limited ability to care for self  Marital Status:   Support system: , Children          Description of Support System: Supportive, Involved    Support Assessment: Adequate family and caregiver support, Adequate social supports    Current Resources:   Patient receiving home care services: Yes  Skilled Home Care Services: Skilled Nursing, Physical Therapy, Occupational Therapy     Community Resources: DME, Home Care, Home Infusion  Equipment currently used at home: walker, rolling, wheelchair, manual, grab bar, toilet, grab bar, tub/shower  Supplies currently used at home: Enteral Nutrition & Supplies    Employment/Financial:  Employment Status: disabled        Financial Concerns: none   Referral to Financial Worker: No       Does the patient's insurance plan have a 3 day qualifying hospital stay waiver?  No    Lifestyle & Psychosocial Needs:  Social Drivers of Health     Food Insecurity: Low Risk  (5/15/2025)    Food Insecurity     Within the past 12 months, did you worry that your food would run out before you got money to buy more?: No     Within the past 12  months, did the food you bought just not last and you didn t have money to get more?: No   Recent Concern: Food Insecurity - High Risk (4/16/2025)    Food Insecurity     Within the past 12 months, did you worry that your food would run out before you got money to buy more?: Yes     Within the past 12 months, did the food you bought just not last and you didn t have money to get more?: Yes   Depression: At risk (5/13/2025)    PHQ-2     PHQ-2 Score: 5   Housing Stability: Low Risk  (5/15/2025)    Housing Stability     Do you have housing? : Yes     Are you worried about losing your housing?: No   Tobacco Use: Low Risk  (4/17/2025)    Patient History     Smoking Tobacco Use: Never     Smokeless Tobacco Use: Never     Passive Exposure: Not on file   Financial Resource Strain: Low Risk  (5/15/2025)    Financial Resource Strain     Within the past 12 months, have you or your family members you live with been unable to get utilities (heat, electricity) when it was really needed?: No   Alcohol Use: Not on file   Transportation Needs: Low Risk  (5/15/2025)    Transportation Needs     Within the past 12 months, has lack of transportation kept you from medical appointments, getting your medicines, non-medical meetings or appointments, work, or from getting things that you need?: No   Physical Activity: Not on file   Interpersonal Safety: Low Risk  (5/15/2025)    Interpersonal Safety     Do you feel physically and emotionally safe where you currently live?: Yes     Within the past 12 months, have you been hit, slapped, kicked or otherwise physically hurt by someone?: No     Within the past 12 months, have you been humiliated or emotionally abused in other ways by your partner or ex-partner?: No   Stress: Not on file   Social Connections: Not on file   Health Literacy: Not on file       Functional Status:  Prior to admission patient needed assistance:   Dependent ADLs:: Dressing  Dependent IADLs:: Cleaning, Laundry, Shopping,  Transportation, Medication Management       Mental Health Status:  Mental Health Status: No Current Concerns       Chemical Dependency Status:  Chemical Dependency Status: No Current Concerns             Values/Beliefs:  Spiritual, Cultural Beliefs, Hoahaoism Practices, Values that affect care: no          Values/Beliefs Comment:  (Lutheran)    Discussed  Partnership in Safe Discharge Planning  document with patient/family: No    Additional Information:    Met with pt at bedside to complete initial assessment for elevated risk score    Address and PCP confirmed. Pt lives with spouse who assists as needed and provides transportation, will provide transport at discharge. Open to [facility] for SN/PT/OT and [facility] for tube feeds.     Next Steps:     [facility] for home care and home infusion.  IMM    JENNY Portillo BSW  Float   [facility]  Available on [email or web address]

## 2025-05-16 NOTE — PLAN OF CARE
Patient is A&Ox4, able to make needs known, using call light appropriately.  VSS, hypotensive this am, MD updated, 500ml bolus of LR given, LS clear. CMS impaired per baseline, Neuros are intact. Patient reports significant abdominal pain and back pain, utilizing oxycodone Q4H and IV dilaudid as needed, monitored on continuous pulse ox. BS present, Patient is passing gas, no bm this shift, extra bowel medications scheduled due to extra stool noted on scan, Voiding spontaneously in the toilet. Tolerating regular diet, tube feeding orders placed, awaiting delivery of formula to start feeds. This morning patient hypoglycemic, D10 started (night nurse pushed D50 see MAR) D10 titrated per orders from diabetes team, patient remained asymptomatic, D10 still running checking BG frequently.  Denies dizziness, SOB & CP. IV infusing.

## 2025-05-16 NOTE — CONSULTS
GASTROENTEROLOGY CONSULTATION      Date of Admission:  5/15/2025          ASSESSMENT AND RECOMMENDATIONS:     59 year old female with a history of idiopathic chronic pancreatitis s/p TPIAT in 2012 with associated insulin-dependent diabetes mellitus, migraines, hypothyroidism, RYGB with G-tube and J-tube placement presents with abdominal and back pain. GI Hepatology service consulted for evaluation of transaminitis.     #. Elevated LFTs, hepatocellular pattern    Mild AST/ALT elevation (only minimal ALP elevation, normal bilirubin) with predominant AST elevation previously, but not consistently. Normal INR, no thrombocytopenia, no splenomegaly.     Ultrasound in the past has revealed hepatic steatosis. No evidence of portal hypertension on imaging to suggest cirrhosis. Patent liver vasculature on CT abdomen/pelvis w contrast recently.     Differential for this mild transaminitis is broad and includes MASLD, drug-induced liver injury (although no clear medication to explain this in this case), chronic viral hepatitis (although negative panel in 2022), autoimmune hepatitis, Jose's disease, congestive hepatopathy (unlikely given normal Echocardiogram previously), alcoholic hepatitis (although patient refuses EtOH use but more susceptible for RYGB patients), Hemochromatosis, celiac disease (although negative serologies previously), thyroid disease, malnutrition with associated vitamin deficiencies.     #. Chronic abdominal pain    Patient known to our service will multiple admissions related to her abdominal pain, with prior recurrent PEG-J tube displacements s/p separate PEG/PEJ placement with Dr. Perdomo on 05/02/2025.      Unclear and likely multifactorial cause of abdominal pain with multiple imaging studies and prior EGD/Colonoscopy in the past, ongoing for years. Gastroparesis likely plays a big role in this pain, as well as prior surgeries, or an even more general GI dysmotility.     RECOMMENDATIONS    -  Would check viral hepatitis panel, ERLINDA, smooth muscle antibodies, IgG, AMA, ceruloplasmin, iron studies, PETh, celiac labs, A1AT (ordered).  - Further evaluation with US liver. Can consider fibroscan outpatient with PCP to assess for liver fibrosis.   - Continue J feeds as tolerated. Use J tube for feeding, G tube for venting.   - Consider Reglan 10 mg IV TID for nausea.   - Consider promethazine q6h PRN as well.  - Continue PERT, PPI therapy.  - Minimize narcotic use.  - Continue bowel regimen.     Gastroenterology follow up recommendations: Follow-up in GI clinic 06/18/2025    Thank you for involving us in this patient's care. Please do not hesitate to contact the GI service with any questions or concerns.     Patient care plan discussed with Dr. Leventhal and Dr. Valera, GI staff physician.    Dudley Pacheco MD  Gastroenterology Fellow  Pager             Chief Complaint:   We were asked by Medicine service of [facility] to evaluate this patient with transaminitis.     History is obtained from the patient and the medical record.          History of Present Illness:   Chantell Kidd is a 59 year old female with a history of idiopathic chronic pancreatitis s/p TPIAT in 2012 with associated insulin-dependent diabetes mellitus, migraines, hypothyroidism, RYGB with G-tube and J-tube placement presents with abdominal and back pain. GI Hepatology service consulted for evaluation of transaminitis.       Patient with extensive medical history as described above. Now admitted with ongoing abdominal and back pain.    Patient seen this morning. Continues to report abdominal and back pain. This has been ongoing intermittently for years. Recently had PEG-J tube changed to separate PEG and PEJ tubes. Reports that she has been using her G tube for feeding and J tube for drainage (?). Denies other symptoms. Reports ~ 5 lb weight loss the past month.          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    TPIAT    Spasm of sphincter of Oddi             Past Surgical History:   Reviewed and edited as appropriate   Past Surgical History:   Procedure Laterality Date    ARTHROPLASTY CARPOMETACARPAL (THUMB JOINT)  05/02/2014    Procedure: ARTHROPLASTY CARPOMETACARPAL (THUMB JOINT);  Surgeon: Carina Panda MD;  Location: [facility]    CHOLECYSTECTOMY  01/01/2004    COLONOSCOPY  07/18/2014    Procedure: COLONOSCOPY;  Surgeon: Aurora Sahu MD;  [facility]: [facility]    COLONOSCOPY N/A 08/01/2017    Procedure: COLONOSCOPY;  Colonoscopy and upper endoscopy;  Surgeon: Deirdre Harris MD;  [facility]    ENDOSCOPIC INSERTION TUBE JEJUNOSTOMY N/A 5/2/2025    Procedure: Esophagogastroduodenoscopy, Jejunopexy, JEJUNOSTOMY TUBE PLACEMENT, GASTROSTOMY TUBE EXCHANGE;  Surgeon: Jose Francisco Perdomo MD;  [facility]    ENDOSCOPIC RETROGRADE CHOLANGIOPANCREATOGRAM      ENDOSCOPIC RETROGRADE CHOLANGIOPANCREATOGRAM  04/19/2011    Procedure:ENDOSCOPIC RETROGRADE CHOLANGIOPANCREATOGRAM; Pancreatic Stent Placement      ENDOSCOPIC RETROGRADE CHOLANGIOPANCREATOGRAM  05/26/2011    Procedure:ENDOSCOPIC RETROGRADE CHOLANGIOPANCREATOGRAM; with Pancreatic Stent Removal; Surgeon:DALE MIMS; Location[facility]    ENDOSCOPY UPPER, COLONOSCOPY, COMBINED  04/25/2012    Procedure:COMBINED ENDOSCOPY UPPER, COLONOSCOPY; Enteroscopy with Bile Duct Stent Removal, Colonoscopy  *[facility] Safe Room*; Surgeon:GRACY GODWIN; Location[facility]    ESOPHAGOSCOPY,  GASTROSCOPY, DUODENOSCOPY (EGD), COMBINED  05/26/2011    Procedure:COMBINED ESOPHAGOSCOPY, GASTROSCOPY, DUODENOSCOPY (EGD); Surgeon:DALE MIMS; [facility]    ESOPHAGOSCOPY, GASTROSCOPY, DUODENOSCOPY (EGD), COMBINED N/A 10/30/2014    Procedure: COMBINED ESOPHAGOSCOPY, GASTROSCOPY, DUODENOSCOPY (EGD), BIOPSY SINGLE OR MULTIPLE;  Surgeon: Sarai Moon MD;  [facility]    ESOPHAGOSCOPY, GASTROSCOPY, DUODENOSCOPY (EGD), COMBINED Left 07/06/2015    Procedure: COMBINED ESOPHAGOSCOPY, GASTROSCOPY, DUODENOSCOPY (EGD), BIOPSY SINGLE OR MULTIPLE;  Surgeon: Thomas Estrada MD;  [facility]    ESOPHAGOSCOPY, GASTROSCOPY, DUODENOSCOPY (EGD), COMBINED N/A 07/08/2016    Procedure: COMBINED ESOPHAGOSCOPY, GASTROSCOPY, DUODENOSCOPY (EGD), BIOPSY SINGLE OR MULTIPLE;  Surgeon: Eloy Klein MD;  Location:  GI    ESOPHAGOSCOPY, GASTROSCOPY, DUODENOSCOPY (EGD), COMBINED N/A 08/04/2016    Procedure: COMBINED ESOPHAGOSCOPY, GASTROSCOPY, DUODENOSCOPY (EGD), BIOPSY SINGLE OR MULTIPLE;  Surgeon: Jason Brown MD;  Location:  GI    ESOPHAGOSCOPY, GASTROSCOPY, DUODENOSCOPY (EGD), COMBINED N/A 08/01/2017    Procedure: COMBINED ESOPHAGOSCOPY, GASTROSCOPY, DUODENOSCOPY (EGD);;  Surgeon: Deirdre Harris MD;  Location:  GI    ESOPHAGOSCOPY, GASTROSCOPY, DUODENOSCOPY (EGD), COMBINED N/A 06/12/2019    Procedure: ESOPHAGOGASTRODUODENOSCOPY (EGD);  Surgeon: Jose Francisco Perdomo MD;  Location:  GI    GYN SURGERY      Hysterectomy and USO    [facility] ENDOSCOPY W EUS  07/20/2011    Procedure:COMBINED ENDOSCOPIC ULTRASOUND, ESOPHAGOSCOPY, GASTROSCOPY, DUODENOSCOPY (EGD); Surgeon:DARVIN DONOHUE; Location: GI    HERNIORRHAPHY VENTRAL N/A 09/15/2016    Procedure: HERNIORRHAPHY VENTRAL;  Surgeon: Juanita Bernabe MD;  [facility]    HYSTERECTOMY  1997 or 1998    USO    INCISION AND DRAINAGE ABDOMEN WASHOUT, COMBINED  08/16/2012    Procedure: COMBINED  INCISION AND DRAINAGE ABDOMEN WASHOUT;  ,debridement and Drainage Post Appendectomy;  Surgeon: Ron Austin MD;  [facility]    INJECT TRANSVERSUS ABDOMINIS PLANE (TAP) BLOCK BILATERAL Bilateral 05/26/2016    Procedure: INJECT TRANSVERSUS ABDOMINIS PLANE (TAP) BLOCK BILATERAL;  Surgeon: Leonard Mccallum MD;  [facility]    IR NG TUBE PLACEMENT REQ RAD & FLUORO  12/04/2017    LAPAROSCOPIC APPENDECTOMY  07/30/2012    Procedure: LAPAROSCOPIC APPENDECTOMY;  Open Appendectomy;  Surgeon: Ron Austin MD;  [facility]    PANCREATECTOMY, TRANSPLANT AUTO ISLET CELL, COMBINED  01/06/2012    Procedure:COMBINED PANCREATECTOMY, TRANSPLANT AUTO ISLET CELL; Total  Pancreatectomy, Auto Islet Transplant, splenectomy, 18fr. transgastric-jejunal feeding tube placement, liver biopsy; Surgeon:PALAK LEE; [facility]    PICC DOUBLE LUMEN PLACEMENT Right 04/19/2025    5FR DL PICC, brachial medial vein. L-38cm, 3cm out.    PICC INSERTION - DOUBLE LUMEN Right 04/30/2025    39-3cm, Medial brachial vein    REPLACE GASTROSTOMY TUBE, PERCUTANEOUS N/A 08/30/2017    Procedure: REPLACE GASTROSTOMY TUBE, PERCUTANEOUS;  GJ Tube Change;  Surgeon: Jose Nath PA-C;  [facility]    SPLENECTOMY              Previous Endoscopy:     Results for orders placed or performed during the hospital encounter of 08/01/17   COLONOSCOPY    Collection Time: 08/01/17  7:56 AM   Result Value Ref Range    COLONOSCOPY       [address] [place] [address] [phone]     Endoscopy Department  _______________________________________________________________________________  Patient Name: Chantell Kidd               Procedure Date: 8/1/2017 7:56 AM  MRN: [id number]                       Account Number: [id number]  YOB: 1965             Admit Type: Outpatient  Age: 51                               Room: Spec. Proc.  Gender: Female                        Note Status:  Finalized  Attending MD: Deirdre Harris MD  Total Sedation Time:   _______________________________________________________________________________     Procedure:           Colonoscopy  Indications:         Rectal bleeding  Providers:           Deirdre Harris MD, Yuliet Marie, CARLOS  Patient Profile:     Ms. Kidd is a 49 year old female with prior idiopathic                        chronic pancreatitis s/p EUS and multiple ERCPs with                        eventual total pancreatectomy with a uto-islet cell                        transplant in 1/2012, with ongoing chronic abdominal                        pain (with a negative evaluation including prior CTs,                        MRCPs, US abd, EGDs and colonoscopies), nausea/vomiting                        (now improved), and iron deficiency anemia. She is                        referred for colonoscopy for BRBPR.  Referring MD:        Thomas Estrada MD  Medicines:           Monitored Anesthesia Care  Complications:       No immediate complications.  _______________________________________________________________________________  Procedure:           Pre-Anesthesia Assessment:                       - See the other procedure note for documentation of the                        pre-procedure assessment.                       - See separate Anesthesia note for pre-anesthesia                        assessment.                       After obtaining informed consent, the colonoscope was                        passed under dir ect vision. Throughout the procedure,                        the patient's blood pressure, pulse, and oxygen                        saturations were monitored continuously. The Colonoscope                        was introduced through the anus and advanced to the                        terminal ileum. The colonoscopy was performed without                        difficulty. The patient tolerated the procedure well.                         The quality of the bowel preparation was evaluated using                        the BBPS ([place] Bowel Preparation Scale) with scores                        of: Right Colon = 1 (portion of mucosa seen, but other                        areas not well seen due to staining, residual stool                        and/or opaque liquid), Transverse Colon = 2 (minor                        amount of residual staining, small fragments of stool                        and/or opaque liquid, but mucosa seen well) and Left                        Colon = 2 (minor amoun t of residual staining, small                        fragments of stool and/or opaque liquid, but mucosa seen                        well). The total BBPS score equals 5. The quality of the                        bowel preparation was fair to good, but with extensive                        lavage and cleaning the prep was felt to be good.                                                                                   Findings:       The perianal exam findings include skin tags.       The digital rectal exam was normal.       The terminal ileum appeared normal.       Semi-liquid stool was seen in the right and mid-colon. Some solid        stool/vegetable material was found in the transverse colon, making        visualization difficult. Extensive lavage of the entire colon was        performed (>1500 mL) resulting in clearance with adequate visualization.        The solid pieces of stool had to be moved with the scope to visualize        underneath.       The exam was otherwise without  abnormality on direct and retroflexion        views.                                                                                   Impression:          No source of bleeding found on exam and no signs of                        active or recent bleeding. Noted skin tags which may                        suggest prior hemorrhoids and irregular movements which                         can predispose to fissure.                       - Preparation of the colon was fair, but after lavage                        was felt to be good.                       - Perianal skin tags found on perianal exam.                       - The examined portion of the ileum was normal.                       - Some small solid stool in the transverse colon which                        was moved to facilitate visualization.                       - The examination was otherwise normal on direct and                        retroflexion views.                       - No specimens collected.  Recommendation:      -  Discharge patient to home.                       - Resume previous diet.                       - Return to referring physician.                       - Repeat colonoscopy for screening purposes in 10 years                        (unless there is new information regarding family                        history which would prompt earlier exam).                       - Future colonoscopies should be done with a 1.5 - 2 day                        prep.                                                                                     Deirdre Harris MD  _______________________  Deirdre Harris MD  8/1/2017 12:04:46 PM  I was physically present for the entire viewing portion of the exam.  __________________________  Signature of teaching physician  B4c/Tyler Harris MD  Number of Addenda: 0    Note Initiated On: 8/1/2017 7:56 AM  Scope In:  Scope Out:              Social History:   Reviewed and edited as appropriate  Social History     Socioeconomic History    Marital status:      Spouse name: Not on file    Number of children: Not on file    Years of education: Not on file    Highest education level: Not on file   Occupational History    Not on file   Tobacco Use    Smoking status: Never    Smokeless tobacco: Never   Vaping Use    Vaping status: Never Used   Substance and Sexual Activity     Alcohol use: No     Alcohol/week: 0.0 standard drinks of alcohol    Drug use: No    Sexual activity: Yes   Other Topics Concern    Parent/sibling w/ CABG, MI or angioplasty before 65F 55M? Not Asked   Social History Narrative    Not on file     Social Drivers of Health     Financial Resource Strain: Low Risk  (5/15/2025)    Financial Resource Strain     Within the past 12 months, have you or your family members you live with been unable to get utilities (heat, electricity) when it was really needed?: No   Food Insecurity: Low Risk  (5/15/2025)    Food Insecurity     Within the past 12 months, did you worry that your food would run out before you got money to buy more?: No     Within the past 12 months, did the food you bought just not last and you didn t have money to get more?: No   Recent Concern: Food Insecurity - High Risk (4/16/2025)    Food Insecurity     Within the past 12 months, did you worry that your food would run out before you got money to buy more?: Yes     Within the past 12 months, did the food you bought just not last and you didn t have money to get more?: Yes   Transportation Needs: Low Risk  (5/15/2025)    Transportation Needs     Within the past 12 months, has lack of transportation kept you from medical appointments, getting your medicines, non-medical meetings or appointments, work, or from getting things that you need?: No   Physical Activity: Not on file   Stress: Not on file   Social Connections: Not on file   Interpersonal Safety: Low Risk  (5/15/2025)    Interpersonal Safety     Do you feel physically and emotionally safe where you currently live?: Yes     Within the past 12 months, have you been hit, slapped, kicked or otherwise physically hurt by someone?: No     Within the past 12 months, have you been humiliated or emotionally abused in other ways by your partner or ex-partner?: No   Housing Stability: Low Risk  (5/15/2025)    Housing Stability     Do you have housing? : Yes     Are  you worried about losing your housing?: No            Family History:   Reviewed and edited as appropriate  No known history of gastrointestinal/liver disease or  gastrointestinal malignancies       Allergies:   Reviewed and edited as appropriate     Allergies   Allergen Reactions    Corticosteroids Other (See Comments)     All oral, IV and injectable steroids are contraindicated (unless in life threatening situations) in Islet Auto transplant recipients. They can cause irreversible loss of islet cell function. Please contact patient's transplant care coordinator YURI Cortez RN at [phone]/pager [phone] and/or endocrinologist prior to administration.      Chocolate Flavoring Agent (Non-Screening) Rash     Breaks out when eats chocolate            Medications:     Current Facility-Administered Medications   Medication Dose Route Frequency Provider Last Rate Last Admin    acetaminophen (TYLENOL) tablet 650 mg  650 mg Oral TID Shivani Simon PA-C   650 mg at 05/16/25 0848    Or    acetaminophen (TYLENOL) Suppository 650 mg  650 mg Rectal TID Shivani Simon PA-C        calcium carbonate (TUMS) chewable tablet 1,000 mg  1,000 mg Oral 4x Daily PRN Shivani Simon PA-C        cyclobenzaprine (FLEXERIL) tablet 5 mg  5 mg Oral TID PRN Shivani Simon PA-C        And    cyclobenzaprine (FLEXERIL) tablet 5 mg  5 mg Oral At Bedtime PRN Shivani Simon PA-C        dextrose 10% infusion   Intravenous Continuous Samantha Pavon PA-C 15 mL/hr at 05/16/25 0845 New Bag at 05/16/25 0845    glucose gel 15-30 g  15-30 g Oral Q15 Min PRN Mahsa Jose APRN CNP        Or    dextrose 50 % injection 25-50 mL  25-50 mL Intravenous Q15 Min PRN Mahsa Jose APRN CNP   25 mL at 05/16/25 0722    Or    glucagon injection 1 mg  1 mg Subcutaneous Q15 Min PRN Mahsa Jose APRN CNP        DULoxetine (CYMBALTA) DR capsule 90 mg  90 mg Oral Daily Shivani Simon PA-C   90 mg at 05/16/25 0848    famotidine (PEPCID) tablet 20 mg  20 mg Oral  At Bedtime Shivani Simon PA-C   20 mg at 05/15/25 2205    furosemide (LASIX) tablet 40 mg  40 mg Oral BID Shivani Simon PA-C   40 mg at 05/16/25 0849    gabapentin (NEURONTIN) capsule 400 mg  400 mg Oral TID Shivani Simon PA-C   400 mg at 05/16/25 0849    hydrocortisone (CORTEF) tablet 10 mg  10 mg Oral QAM Shivani Simon PA-C   10 mg at 05/16/25 0853    hydrocortisone (CORTEF) tablet 15 mg  15 mg Oral QPM Shivani Simon PA-C   15 mg at 05/15/25 2008    HYDROmorphone (DILAUDID) injection 0.4 mg  0.4 mg Intravenous Q4H PRN Shivani Siomn PA-C   0.4 mg at 05/16/25 0621    insulin aspart (NovoLOG) injection (RAPID ACTING)  1-4 Units Subcutaneous Q4H Mahsa Jose APRN CNP        [Held by provider] insulin aspart (NovoLOG) injection (RAPID ACTING)   Subcutaneous TID w/meals Shivani Simon PA-C        [Held by provider] insulin aspart (NovoLOG) injection (RAPID ACTING)   Subcutaneous With Snacks or Supplements Shivani Simon PA-C        [Held by provider] insulin glargine (LANTUS PEN) injection 18 Units  18 Units Subcutaneous BID Shivani Simon PA-C   18 Units at 05/15/25 2017    [Held by provider] insulin NPH injection 8 Units  8 Units Subcutaneous QAM AC Shivani Simon PA-C        levothyroxine (SYNTHROID/LEVOTHROID) tablet 125 mcg  125 mcg Oral QAM  Shivani Simon PA-C   125 mcg at 05/16/25 0849    lidocaine (LMX4) cream   Topical Q1H PRN Shivani Simon PA-C        lidocaine 1 % 0.1-1 mL  0.1-1 mL Other Q1H PRN Shivani Simon PA-C        linaclotide (LINZESS) capsule 145 mcg  145 mcg Oral QAM  Shivani Simon PA-C   145 mcg at 05/16/25 0851    lipase-protease-amylase (CREON 12) 18984-43881-02750 units per capsule 6 capsule  6 capsule Oral With Snacks or Supplements [place], Shivani, PA-C        lipase-protease-amylase (CREON 12) 59747-59535-56253 units per capsule 8 capsule  8 capsule Oral TID w/meals Shivani Simon PA-C        melatonin tablet 5 mg  5 mg Oral At Bedtime PRN Shivani Simon PA-C        menthol (ICY HOT) 5 % patch  1 patch  1 patch Topical Q8H PRN Braxton Banuelos MD        metoclopramide (REGLAN) tablet 10 mg  10 mg Oral TID Shivani Simon PA-C   10 mg at 05/16/25 0849    naloxone (NARCAN) injection 0.2 mg  0.2 mg Intravenous Q2 Min PRN Shivani Simon PA-C        Or    naloxone (NARCAN) injection 0.4 mg  0.4 mg Intravenous Q2 Min PRN Shivani Simon PA-C        Or    naloxone (NARCAN) injection 0.2 mg  0.2 mg Intramuscular Q2 Min PRN Shivani Simon PA-C        Or    naloxone (NARCAN) injection 0.4 mg  0.4 mg Intramuscular Q2 Min PRN Shivani Simon PA-C        ondansetron (ZOFRAN ODT) ODT tab 4 mg  4 mg Oral Q6H PRN Shivani Simon PA-C   4 mg at 05/15/25 2007    Or    ondansetron (ZOFRAN) injection 4 mg  4 mg Intravenous Q6H PRN Shivani Simon PA-C   4 mg at 05/16/25 0220    oxyCODONE (ROXICODONE) tablet 5-10 mg  5-10 mg Oral or Feeding Tube Q4H PRN Shivani Simon PA-C   10 mg at 05/16/25 0900    pantoprazole (PROTONIX) EC tablet 40 mg  40 mg Oral BID Shivani Simon PA-C   40 mg at 05/16/25 0848    polyethylene glycol (MIRALAX) Packet 17 g  17 g Oral TID Sean Benítez MD        potassium chloride minerva ER (KLOR-CON M20) CR tablet 20 mEq  20 mEq Oral Daily Shivani Simon PA-C   20 mEq at 05/16/25 0849    senna-docusate (SENOKOT-S/PERICOLACE) 8.6-50 MG per tablet 2 tablet  2 tablet Oral BID Sean Benítez MD        Or    senna-docusate (SENOKOT-S/PERICOLACE) 8.6-50 MG per tablet 2 tablet  2 tablet Oral BID Sean Benítez MD   2 tablet at 05/16/25 0849    simethicone (MYLICON) chewable tablet 80 mg  80 mg Oral 4x Daily Sean Benítez MD   80 mg at 05/16/25 0848    sodium chloride (PF) 0.9% PF flush 3 mL  3 mL Intracatheter Q8H CHARLES Shivani Simon PA-C   3 mL at 05/15/25 2155    sodium chloride (PF) 0.9% PF flush 3 mL  3 mL Intracatheter q1 min prn Shivani Simon PA-C   3 mL at 05/16/25 0621    sucralfate (CARAFATE) tablet 1 g  1 g Oral 4x Daily AC & HS Shivani Simon PA-C   1 g at 05/16/25 0850    thiamine (B-1) tablet 100 mg  100 mg  Per Feeding Tube Daily Shivain Simon PA-C   100 mg at 05/16/25 0848    topiramate (TOPAMAX) tablet 100 mg  100 mg Oral BID Shivani Simon PA-C   100 mg at 05/16/25 0854    traZODone (DESYREL) tablet 200 mg  200 mg Oral QPM Shivani Simon PA-C   200 mg at 05/15/25 2007             Review of Systems:     A complete review of systems was performed and is negative except as noted in the HPI           Physical Exam:   BP (!) 84/59 (BP Location: Right arm, Patient Position: Semi-Amaya's, Cuff Size: Adult Small)   Pulse 84   Temp 97.9  F (36.6  C)   Resp 16   SpO2 95%   Wt:   Wt Readings from Last 2 Encounters:   05/13/25 48.5 kg (107 lb)   05/06/25 46.6 kg (102 lb 11.2 oz)      Constitutional: cooperative, pleasant, not dyspneic/diaphoretic, no acute distress  Eyes: Sclera anicteric/injected  Ears/nose/mouth/throat: Normal oropharynx without ulcers or exudate, mucus membranes moist  Neck: supple  CV: RRR, No edema  Respiratory: Unlabored breathing  Abdomen: PEG and PEJ tubes in place. Nondistended, + diffusely tender, no peritoneal signs  Skin: warm, perfused, no jaundice  Neuro: AAO x 3, No asterixis  Psych: Normal affect  MSK: Normal gait         Data:   Labs and imaging below were independently reviewed and interpreted    BMP  Recent Labs   Lab 05/16/25  0943 05/16/25  0757 05/16/25  0731 05/16/25  0710 05/16/25  0650 05/16/25  0617 05/15/25  1332 05/15/25  1054 05/13/25  1213   NA  --   --   --   --   --  139  --  137 131*   POTASSIUM  --   --   --   --   --  3.3*  --  4.0 4.1   CHLORIDE  --   --   --   --   --  101  --  94* 93*   ELIZA  --   --   --   --   --  9.0  --  9.3 9.2   CO2  --   --   --   --   --  28  --  28 23   BUN  --   --   --   --   --  4.1*  --  3.6* 19.5   CR  --   --   --   --   --  0.43*  --  0.41* 3.05*   * 169* 182* 38*   < > 55*   < > 571* 410*    < > = values in this interval not displayed.     CBC  Recent Labs   Lab 05/16/25  0617 05/15/25  1054 05/13/25  1213   WBC 10.8 8.0 10.7   RBC  3.60* 3.89 3.71*   HGB 11.6* 12.3 11.9   HCT 33.5* 36.5 33.6*   MCV 93 94 91   MCH 32.2 31.6 32.1   MCHC 34.6 33.7 35.4   RDW 12.8 12.6 12.4   * 699* 674*     INRNo lab results found in last 7 days.  LFTs  Recent Labs   Lab 05/16/25  0617 05/15/25  1054 05/13/25  1213   ALKPHOS 126 152* 143   AST 45 227* 24   ALT 55* 89* 41   BILITOTAL 0.2 0.3 0.2   PROTTOTAL 6.4 7.0 6.9   ALBUMIN 3.4* 4.0 4.0      PANCNo lab results found in last 7 days.

## 2025-05-16 NOTE — PROGRESS NOTES
[facility]  Patient is currently receiving services with [facility]. The patient is currently receiving RN PT OT services. Patient's  and home health team have been notified that patient is under inpatient status. [facility] Liaison will continue to follow patient. Please provide orders to resume home care at time of discharge if appropriate.

## 2025-05-16 NOTE — PROGRESS NOTES
CLINICAL NUTRITION SERVICES - ASSESSMENT NOTE    RECOMMENDATIONS FOR MDs/PROVIDERS TO ORDER:  None    Registered Dietitian Interventions:  Tantaline Peptide 1.5 (or equivalent) @ 45 mL/hr (1080 mL/day) to provide 1661 kcal, 80 g protein, 149 g CHO, 16 g fiber, 83 g fat (40% from MCTs), and 756 mL free water daily + relizorb  150 Q 4 FWF    Future/Additional Recommendations:  Monitor TF and oral tolerance, labs, weights     REASON FOR ASSESSMENT  Provider order - Registered Dietitian to order TF per Medical Nutrition Therapy Guidelines    INFORMATION OBTAINED  Assessed patient in room..     NUTRITION HISTORY  Pt reports she just ate for the first time since admission and tolerated it well. Normally has GI symptoms such as nausea, abdominal pain. Discussed TF regimen, pt confirmed volume, timing and use of Relizorb. Pt also takes enzymes with food intakes. Encouraged oral intakes as tolerated.     CURRENT NUTRITION ORDERS  Diet: Regular    Snacks/Supplements: None currently ordered    Nutrition Support: Per previous RD note, pt previously receiving: Tantaline Peptide 1.5 (or equivalent) @ 45 mL/hr (1080 mL/day) to provide 1661 kcal, 80 g protein, 149 g CHO, 16 g fiber, 83 g fat (40% from MCTs), and 756 mL free water daily    --150 ml Q4H fluid flushes for tube patency. Additional fluids and/or adjustments per MD.      LABS  Nutrition-relevant labs:    05/05/25 07:22 05/06/25 06:33 05/13/25 12:13 05/15/25 10:54 05/16/25 06:17   Sodium 138 138 131 (L) 137 139   Potassium 4.1 4.4 4.1 4.0 3.3 (L)   Urea Nitrogen 16.8 20.1 19.5 3.6 (L) 4.1 (L)   Creatinine 0.37 (L) 0.37 (L) 3.05 (H) 0.41 (L) 0.43 (L)   Magnesium    3.2 (H)    Glucose 143 (H) 168 (H) 410 (H) 571 (HH) 55 (L)     MEDICATIONS  Nutrition-relevant medications: Pepcid, lasix, insulin (novolog, lantus) Creon 12 8 capsules with meals, Reglan, protonix, miralax, potassium chloride, senna, simethicone, Carafate, thiamine, dilaudid PRN, zofran PRN, oxycodone  "PRN    ANTHROPOMETRICS  Height:157.5 cm (5' 2\")  Most Recent Weight: 48.5 kg (107 lb)   IBW: 50 kg (97% IBW)  BMI: 19.57 kg/m    Weight History: Pt with limited weight history prior to admission, with 8 lb (9%) weight gain in 1 month, overall seems weight stable with fluctuations over last 5 months.  05/13/25 48.5 kg (107 lb)   05/06/25 46.6 kg (102 lb 11.2 oz)   05/03/25 46.2 kg (101 lb 12.8 oz)    04/29/25 47.2 kg (104 lb)   04/21/25 50.4 kg (111 lb 1.6 oz)   04/16/25 45.7 kg (100 lb 12.8 oz)    04/03/25 42.8 kg (94 lb 6.4 oz)   12/11/24 47.7 kg (105 lb 3.2 oz) -Care everywhere   09/12/24 49.6 kg (109 lb 6.4 oz)-  Care everywhere   09/10/24 49.2 kg (108 lb 8 oz) - Care everywhere   07/01/24 49.8 kg (109 lb 12.8 oz) - Care everywhere   04/04/23 59.2 kg (130 lb 8 oz)   03/02/23 59.6 kg (131 lb 6.4 oz)   02/10/23 59.2 kg (130 lb 8 oz)     Dosing Weight: 48.5 kg - most recent weight    ASSESSED NUTRITION NEEDS  Estimated Energy Needs: 9318-2737 kcal/day 30-35 kcal/kg  Justification: Increased needs  Estimated Protein Needs: 73-97 grams protein/day (1.2 - 1.5 grams of pro/kg)  Justification: Increased needs  Estimated Fluid Needs: 1 mL/kcal or per provider pending fluid status   Justification: Increased needs    SYSTEM AND PHYSICAL FINDINGS    GI symptoms: Pt denies GI symptoms at present however with baseline nausea/vomiting and abdominal pain  Skin/wounds: No pressure injury indicated G/J tube     MALNUTRITION  % Intake: Unable to assess  % Weight Loss: Difficult to assess  Subcutaneous Fat Loss: Global moderate  Muscle Loss: Globally moderate-severe  Fluid Accumulation/Edema: Moderate to severe, 2-4+  Malnutrition Diagnosis: Severe malnutrition in the context of acute illness or injury  Malnutrition Present on Admission: Yes    NUTRITION DIAGNOSIS  Inadequate oral intake related to nausea/vomiting/abdominal pain as evidenced by patient report, pt requiring TF to meet nutrition needs.    INTERVENTIONS  Enteral " nutrition management  Management of flushing of feeding tube    GOALS  Total avg nutritional intake to meet a minimum of 30 kcal/kg and 1.2 g PRO/kg daily (per dosing wt 48.5 kg).     MONITORING/EVALUATION  Progress toward goals will be monitored and evaluated per policy.    Kathleen Goddard MS, RDN, LD  TCU/OB/Ortho Clinical Dietitian  Available via phone and Vocera  Phone: [phone]  Vocera: 5 Ortho Clinical Dietitian  Weekend/Holiday Vocera: Weekend Holiday Clinical Dietitian [Multi Site Groups]

## 2025-05-16 NOTE — PROGRESS NOTES
CP- and Candida auris screening information    This patient was admitted to a hospital or long-term care facility in a high risk area in the prior 12 months. and is potentially at a high risk for carrying CP- and/or Candida auris. The [place] Department of Health ([facility]) and CDC recommend that we test this patient to prevent the spread of these organisms within our healthcare facility. Testing is voluntary and includes collecting an axilla and groin swab for C. auris and a rectal swab for CP-. Due to the potential transmission of these organisms in healthcare settings, Infection Prevention requires that this patient be placed in a private room on Contact Precautions until testing is complete. While the Candida auris testing is pending, bleach or an approved disinfectant (e.g. PDI Prime) should be used clean the patient s room and equipment.      Please notify Infection Prevention if the patient declines testing.  Additional information and resources can be found on the Infection Prevention [employer] Sharepoint page.     5/16/2025  Rachale Hollis, Infection Prevention

## 2025-05-16 NOTE — PLAN OF CARE
Neuro: A&Ox4.   Cardiac:  VSS.   Respiratory: Sating 98 on RA.  GI/: Adequate urine output. Orange / yellow cloudy, provider aware. No bm this shift.   Diet/appetite: Tolerating regular diet. Minimal appetite. ACHS  Activity:  SBA with GB + walker   Pain: pt c/o abdominal pain, prn dilaudid given x 1   Skin: G / J site   LDA's:  PIV - SL   G tube - clamped   J tube - clamped   Plan: Continue with POC. Notify primary team with changes.      Transfer  Transferred to: 5MS  Via:PowerMessage  Belongings: Packed and sent with pt  Chart: Delivered with pt to next unit  Medications: Meds sent to new unit with pt  Report given to: Kathleen Castle RN   Pt status: /84 (BP Location: Left arm)   Pulse 78   Temp 98.6  F (37  C) (Oral)   Resp 16   SpO2 99%

## 2025-05-17 LAB
ALBUMIN SERPL BCG-MCNC: 3.3 G/DL (ref 3.5–5.2)
ALP SERPL-CCNC: 132 U/L (ref 40–150)
ALT SERPL W P-5'-P-CCNC: 65 U/L (ref 0–50)
ANION GAP SERPL CALCULATED.3IONS-SCNC: 9 MMOL/L (ref 7–15)
AST SERPL W P-5'-P-CCNC: 90 U/L (ref 0–45)
BILIRUB SERPL-MCNC: 0.2 MG/DL
BUN SERPL-MCNC: 12.7 MG/DL (ref 8–23)
CALCIUM SERPL-MCNC: 8.6 MG/DL (ref 8.8–10.4)
CHLORIDE SERPL-SCNC: 98 MMOL/L (ref 98–107)
CREAT SERPL-MCNC: 0.49 MG/DL (ref 0.51–0.95)
EGFRCR SERPLBLD CKD-EPI 2021: >90 ML/MIN/1.73M2
ERYTHROCYTE [DISTWIDTH] IN BLOOD BY AUTOMATED COUNT: 13.1 % (ref 10–15)
FERRITIN SERPL-MCNC: 38 NG/ML (ref 11–328)
GLUCOSE BLDC GLUCOMTR-MCNC: 161 MG/DL (ref 70–99)
GLUCOSE BLDC GLUCOMTR-MCNC: 234 MG/DL (ref 70–99)
GLUCOSE BLDC GLUCOMTR-MCNC: 260 MG/DL (ref 70–99)
GLUCOSE BLDC GLUCOMTR-MCNC: 286 MG/DL (ref 70–99)
GLUCOSE BLDC GLUCOMTR-MCNC: 295 MG/DL (ref 70–99)
GLUCOSE BLDC GLUCOMTR-MCNC: 299 MG/DL (ref 70–99)
GLUCOSE BLDC GLUCOMTR-MCNC: 334 MG/DL (ref 70–99)
GLUCOSE SERPL-MCNC: 306 MG/DL (ref 70–99)
HCO3 SERPL-SCNC: 26 MMOL/L (ref 22–29)
HCT VFR BLD AUTO: 34 % (ref 35–47)
HGB BLD-MCNC: 11.4 G/DL (ref 11.7–15.7)
IRON BINDING CAPACITY (ROCHE): 208 UG/DL (ref 240–430)
IRON SATN MFR SERPL: 19 % (ref 15–46)
IRON SERPL-MCNC: 40 UG/DL (ref 37–145)
MCH RBC QN AUTO: 31.6 PG (ref 26.5–33)
MCHC RBC AUTO-ENTMCNC: 33.5 G/DL (ref 31.5–36.5)
MCV RBC AUTO: 94 FL (ref 78–100)
PLATELET # BLD AUTO: 625 10E3/UL (ref 150–450)
POTASSIUM SERPL-SCNC: 4.3 MMOL/L (ref 3.4–5.3)
PROT SERPL-MCNC: 6 G/DL (ref 6.4–8.3)
RBC # BLD AUTO: 3.61 10E6/UL (ref 3.8–5.2)
SODIUM SERPL-SCNC: 133 MMOL/L (ref 135–145)
WBC # BLD AUTO: 8.8 10E3/UL (ref 4–11)

## 2025-05-17 PROCEDURE — 82103 ALPHA-1-ANTITRYPSIN TOTAL: CPT | Performed by: STUDENT IN AN ORGANIZED HEALTH CARE EDUCATION/TRAINING PROGRAM

## 2025-05-17 PROCEDURE — S9342 HIT ENTERAL PUMP DIEM: HCPCS

## 2025-05-17 PROCEDURE — 84075 ASSAY ALKALINE PHOSPHATASE: CPT

## 2025-05-17 PROCEDURE — 250N000013 HC RX MED GY IP 250 OP 250 PS 637: Performed by: INTERNAL MEDICINE

## 2025-05-17 PROCEDURE — 85027 COMPLETE CBC AUTOMATED: CPT

## 2025-05-17 PROCEDURE — 250N000011 HC RX IP 250 OP 636: Mod: JZ | Performed by: INTERNAL MEDICINE

## 2025-05-17 PROCEDURE — 258N000003 HC RX IP 258 OP 636: Performed by: INTERNAL MEDICINE

## 2025-05-17 PROCEDURE — 86705 HEP B CORE ANTIBODY IGM: CPT | Performed by: INTERNAL MEDICINE

## 2025-05-17 PROCEDURE — 86364 TISS TRNSGLTMNASE EA IG CLAS: CPT | Performed by: STUDENT IN AN ORGANIZED HEALTH CARE EDUCATION/TRAINING PROGRAM

## 2025-05-17 PROCEDURE — 82390 ASSAY OF CERULOPLASMIN: CPT | Performed by: STUDENT IN AN ORGANIZED HEALTH CARE EDUCATION/TRAINING PROGRAM

## 2025-05-17 PROCEDURE — 250N000013 HC RX MED GY IP 250 OP 250 PS 637: Performed by: STUDENT IN AN ORGANIZED HEALTH CARE EDUCATION/TRAINING PROGRAM

## 2025-05-17 PROCEDURE — 86038 ANTINUCLEAR ANTIBODIES: CPT | Performed by: STUDENT IN AN ORGANIZED HEALTH CARE EDUCATION/TRAINING PROGRAM

## 2025-05-17 PROCEDURE — 86015 ACTIN ANTIBODY EACH: CPT | Performed by: STUDENT IN AN ORGANIZED HEALTH CARE EDUCATION/TRAINING PROGRAM

## 2025-05-17 PROCEDURE — 82728 ASSAY OF FERRITIN: CPT | Performed by: STUDENT IN AN ORGANIZED HEALTH CARE EDUCATION/TRAINING PROGRAM

## 2025-05-17 PROCEDURE — 83550 IRON BINDING TEST: CPT | Performed by: STUDENT IN AN ORGANIZED HEALTH CARE EDUCATION/TRAINING PROGRAM

## 2025-05-17 PROCEDURE — 36415 COLL VENOUS BLD VENIPUNCTURE: CPT

## 2025-05-17 PROCEDURE — 80321 ALCOHOLS BIOMARKERS 1OR 2: CPT | Performed by: STUDENT IN AN ORGANIZED HEALTH CARE EDUCATION/TRAINING PROGRAM

## 2025-05-17 PROCEDURE — 99232 SBSQ HOSP IP/OBS MODERATE 35: CPT | Performed by: INTERNAL MEDICINE

## 2025-05-17 PROCEDURE — 99233 SBSQ HOSP IP/OBS HIGH 50: CPT | Performed by: PHYSICIAN ASSISTANT

## 2025-05-17 PROCEDURE — 82784 ASSAY IGA/IGD/IGG/IGM EACH: CPT | Performed by: STUDENT IN AN ORGANIZED HEALTH CARE EDUCATION/TRAINING PROGRAM

## 2025-05-17 PROCEDURE — 250N000011 HC RX IP 250 OP 636: Mod: JZ

## 2025-05-17 PROCEDURE — 250N000013 HC RX MED GY IP 250 OP 250 PS 637

## 2025-05-17 PROCEDURE — 86381 MITOCHONDRIAL ANTIBODY EACH: CPT | Performed by: STUDENT IN AN ORGANIZED HEALTH CARE EDUCATION/TRAINING PROGRAM

## 2025-05-17 PROCEDURE — 120N000002 HC R&B MED SURG/OB UMMC

## 2025-05-17 RX ORDER — HYDROMORPHONE HCL IN WATER/PF 6 MG/30 ML
0.4 PATIENT CONTROLLED ANALGESIA SYRINGE INTRAVENOUS EVERY 6 HOURS PRN
Status: DISCONTINUED | OUTPATIENT
Start: 2025-05-17 | End: 2025-05-17

## 2025-05-17 RX ORDER — OXYCODONE HYDROCHLORIDE 5 MG/1
5-10 TABLET ORAL EVERY 6 HOURS PRN
Refills: 0 | Status: DISCONTINUED | OUTPATIENT
Start: 2025-05-17 | End: 2025-05-22

## 2025-05-17 RX ORDER — PROMETHAZINE HYDROCHLORIDE 25 MG/1
25 TABLET ORAL EVERY 6 HOURS PRN
Status: DISCONTINUED | OUTPATIENT
Start: 2025-05-17 | End: 2025-05-24 | Stop reason: HOSPADM

## 2025-05-17 RX ORDER — BISACODYL 10 MG
10 SUPPOSITORY, RECTAL RECTAL DAILY PRN
Status: DISCONTINUED | OUTPATIENT
Start: 2025-05-17 | End: 2025-05-24 | Stop reason: HOSPADM

## 2025-05-17 RX ORDER — HYDROMORPHONE HCL IN WATER/PF 6 MG/30 ML
.2-.4 PATIENT CONTROLLED ANALGESIA SYRINGE INTRAVENOUS EVERY 6 HOURS PRN
Status: DISCONTINUED | OUTPATIENT
Start: 2025-05-17 | End: 2025-05-19

## 2025-05-17 RX ORDER — DEXTROSE MONOHYDRATE 100 MG/ML
INJECTION, SOLUTION INTRAVENOUS CONTINUOUS PRN
Status: DISCONTINUED | OUTPATIENT
Start: 2025-05-17 | End: 2025-05-24 | Stop reason: HOSPADM

## 2025-05-17 RX ADMIN — METOCLOPRAMIDE 10 MG: 10 TABLET ORAL at 20:24

## 2025-05-17 RX ADMIN — GABAPENTIN 400 MG: 400 CAPSULE ORAL at 20:24

## 2025-05-17 RX ADMIN — SIMETHICONE 80 MG: 80 TABLET, CHEWABLE ORAL at 20:25

## 2025-05-17 RX ADMIN — HYDROMORPHONE HYDROCHLORIDE 0.4 MG: 0.2 INJECTION, SOLUTION INTRAMUSCULAR; INTRAVENOUS; SUBCUTANEOUS at 12:47

## 2025-05-17 RX ADMIN — SUCRALFATE 1 G: 1 TABLET ORAL at 22:43

## 2025-05-17 RX ADMIN — THIAMINE HCL TAB 100 MG 100 MG: 100 TAB at 08:57

## 2025-05-17 RX ADMIN — HYDROMORPHONE HYDROCHLORIDE 0.4 MG: 0.2 INJECTION, SOLUTION INTRAMUSCULAR; INTRAVENOUS; SUBCUTANEOUS at 06:49

## 2025-05-17 RX ADMIN — ACETAMINOPHEN 650 MG: 325 TABLET, FILM COATED ORAL at 13:30

## 2025-05-17 RX ADMIN — ACETAMINOPHEN 650 MG: 325 TABLET, FILM COATED ORAL at 08:50

## 2025-05-17 RX ADMIN — CYCLOBENZAPRINE HYDROCHLORIDE 5 MG: 5 TABLET, FILM COATED ORAL at 14:26

## 2025-05-17 RX ADMIN — PANTOPRAZOLE SODIUM 40 MG: 40 TABLET, DELAYED RELEASE ORAL at 08:52

## 2025-05-17 RX ADMIN — SIMETHICONE 80 MG: 80 TABLET, CHEWABLE ORAL at 08:54

## 2025-05-17 RX ADMIN — OXYCODONE 10 MG: 5 TABLET ORAL at 19:05

## 2025-05-17 RX ADMIN — POLYETHYLENE GLYCOL 3350 17 G: 17 POWDER, FOR SOLUTION ORAL at 08:56

## 2025-05-17 RX ADMIN — METOCLOPRAMIDE 10 MG: 10 TABLET ORAL at 13:31

## 2025-05-17 RX ADMIN — ACETAMINOPHEN 650 MG: 325 TABLET, FILM COATED ORAL at 20:25

## 2025-05-17 RX ADMIN — POLYETHYLENE GLYCOL 3350 17 G: 17 POWDER, FOR SOLUTION ORAL at 20:25

## 2025-05-17 RX ADMIN — POTASSIUM CHLORIDE 20 MEQ: 1500 TABLET, EXTENDED RELEASE ORAL at 08:54

## 2025-05-17 RX ADMIN — CYCLOBENZAPRINE HYDROCHLORIDE 5 MG: 5 TABLET, FILM COATED ORAL at 22:51

## 2025-05-17 RX ADMIN — SIMETHICONE 80 MG: 80 TABLET, CHEWABLE ORAL at 16:59

## 2025-05-17 RX ADMIN — SUCRALFATE 1 G: 1 TABLET ORAL at 08:51

## 2025-05-17 RX ADMIN — LINACLOTIDE 145 MCG: 145 CAPSULE, GELATIN COATED ORAL at 08:55

## 2025-05-17 RX ADMIN — PANCRELIPASE 8 CAPSULE: 60000; 12000; 38000 CAPSULE, DELAYED RELEASE PELLETS ORAL at 13:34

## 2025-05-17 RX ADMIN — POLYETHYLENE GLYCOL 3350 17 G: 17 POWDER, FOR SOLUTION ORAL at 13:32

## 2025-05-17 RX ADMIN — GABAPENTIN 400 MG: 400 CAPSULE ORAL at 13:31

## 2025-05-17 RX ADMIN — PANCRELIPASE 8 CAPSULE: 60000; 12000; 38000 CAPSULE, DELAYED RELEASE PELLETS ORAL at 19:01

## 2025-05-17 RX ADMIN — HYDROMORPHONE HYDROCHLORIDE 0.4 MG: 0.2 INJECTION, SOLUTION INTRAMUSCULAR; INTRAVENOUS; SUBCUTANEOUS at 22:51

## 2025-05-17 RX ADMIN — TOPIRAMATE 100 MG: 50 TABLET, FILM COATED ORAL at 20:25

## 2025-05-17 RX ADMIN — SENNOSIDES AND DOCUSATE SODIUM 2 TABLET: 50; 8.6 TABLET ORAL at 20:26

## 2025-05-17 RX ADMIN — DULOXETINE 90 MG: 60 CAPSULE, DELAYED RELEASE ORAL at 08:53

## 2025-05-17 RX ADMIN — METOCLOPRAMIDE 10 MG: 10 TABLET ORAL at 08:56

## 2025-05-17 RX ADMIN — PANTOPRAZOLE SODIUM 40 MG: 40 TABLET, DELAYED RELEASE ORAL at 20:30

## 2025-05-17 RX ADMIN — SIMETHICONE 80 MG: 80 TABLET, CHEWABLE ORAL at 12:47

## 2025-05-17 RX ADMIN — TRAZODONE HYDROCHLORIDE 200 MG: 100 TABLET ORAL at 20:24

## 2025-05-17 RX ADMIN — ONDANSETRON 4 MG: 2 INJECTION INTRAMUSCULAR; INTRAVENOUS at 08:43

## 2025-05-17 RX ADMIN — PANCRELIPASE 8 CAPSULE: 60000; 12000; 38000 CAPSULE, DELAYED RELEASE PELLETS ORAL at 08:55

## 2025-05-17 RX ADMIN — SODIUM CHLORIDE, SODIUM LACTATE, POTASSIUM CHLORIDE, AND CALCIUM CHLORIDE 500 ML: .6; .31; .03; .02 INJECTION, SOLUTION INTRAVENOUS at 08:48

## 2025-05-17 RX ADMIN — HYDROMORPHONE HYDROCHLORIDE 0.4 MG: 0.2 INJECTION, SOLUTION INTRAMUSCULAR; INTRAVENOUS; SUBCUTANEOUS at 16:55

## 2025-05-17 RX ADMIN — SENNOSIDES AND DOCUSATE SODIUM 2 TABLET: 50; 8.6 TABLET ORAL at 08:51

## 2025-05-17 RX ADMIN — SUCRALFATE 1 G: 1 TABLET ORAL at 11:48

## 2025-05-17 RX ADMIN — OXYCODONE 10 MG: 5 TABLET ORAL at 13:24

## 2025-05-17 RX ADMIN — TOPIRAMATE 100 MG: 50 TABLET, FILM COATED ORAL at 08:52

## 2025-05-17 RX ADMIN — GABAPENTIN 400 MG: 400 CAPSULE ORAL at 08:51

## 2025-05-17 RX ADMIN — HYDROCORTISONE 15 MG: 10 TABLET ORAL at 20:24

## 2025-05-17 RX ADMIN — SUCRALFATE 1 G: 1 TABLET ORAL at 16:59

## 2025-05-17 RX ADMIN — LEVOTHYROXINE SODIUM 125 MCG: 125 TABLET ORAL at 08:54

## 2025-05-17 RX ADMIN — HYDROCORTISONE 10 MG: 10 TABLET ORAL at 08:53

## 2025-05-17 RX ADMIN — OXYCODONE 10 MG: 5 TABLET ORAL at 08:54

## 2025-05-17 RX ADMIN — FAMOTIDINE 20 MG: 20 TABLET, FILM COATED ORAL at 22:43

## 2025-05-17 ASSESSMENT — ACTIVITIES OF DAILY LIVING (ADL)
ADLS_ACUITY_SCORE: 55
ADLS_ACUITY_SCORE: 55
ADLS_ACUITY_SCORE: 61
ADLS_ACUITY_SCORE: 59
ADLS_ACUITY_SCORE: 55
ADLS_ACUITY_SCORE: 59
ADLS_ACUITY_SCORE: 55
ADLS_ACUITY_SCORE: 55
ADLS_ACUITY_SCORE: 61
ADLS_ACUITY_SCORE: 61
ADLS_ACUITY_SCORE: 55
ADLS_ACUITY_SCORE: 59
ADLS_ACUITY_SCORE: 55
ADLS_ACUITY_SCORE: 55
ADLS_ACUITY_SCORE: 59
ADLS_ACUITY_SCORE: 61
ADLS_ACUITY_SCORE: 61
ADLS_ACUITY_SCORE: 59

## 2025-05-17 NOTE — PROGRESS NOTES
Patient A&O times 4; VS with low BP; LS clear and BS+ with abdomen slightly distended and J and G tubes intact and in place to left lower abdomen. Oxycodone and PIV Dilaudid administered for pain. Tube feeding started and abdominal dressings changed. Dextrose IV discontinued after tube feeding started and PIV is saline locked. Insulin per orders with correction per carb intake. Patient up with SBA and ambulated to  and had a BM during shift. Good PO intake and tolerated dinner and denies nausea or vomiting. Tolerated meds via PO without side effects. Continue with patient care.

## 2025-05-17 NOTE — PLAN OF CARE
Patient A/Ox4. VSS. Denies CP, SOB, dizziness/LH. LSCTA. +fl/BS. Voiding very well in bathroom. CMS intact. Tolerating regular diet with tube feedings. Activity level is good, pt walked to bathroom with 1 assist and walker. IV SL. Pain rated as comfortably managed throughout shift, took IV dilaudid around 0650. Likely home with home care and RN services when stable per RNCC. Patient has demonstrated ability to call appropriately. Patient is resting with call light within reach. Will continue to monitor.

## 2025-05-17 NOTE — PROGRESS NOTES
Inpatient Diabetes Management Service: Daily Progress Note     HPI: Chantell Kidd is a 59 year old female admitted on 5/15/2025. 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  Inpatient Diabetes Service consulted for hyperglycemia and then hypoglycemia.          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 Poor control  (A1c 15.8 %  4/30/2025, Hgb: 10.5)  Enteral Feed/Steroid induced  Hyperglycemia  Abdominal pain     Plan/Recommendations:    - Increase lantus 12 units every 24 hours at 21:00   -Increase NPH 10 units every 24 hours at 8 am ( to bring BG down  -Increase NPH 5 units at 8 pm to cover KF TF running at 45 ml/hour   -  Start IV insulin drip if BGs >300 x 2 checks.   - Novolog Meal Coverage: 1 units per 14 g CHO, TID AC and 1:15  PRN with snacks/supplements   - BG monitoring:  before meals, bedtime, and 2 am  - Increase correction scale (ISF 50) before meals, bedtime, and 2 am  -PRN D10W will be needed to prevent hypoglycemia in case of unexpected TF interruption: 50 ml/h will provide 75% of the carbs in       - Hypoglycemia protocol    - Carb counting protocol          Discussion:   BRJ=122.   Yesterday she ate 197g of carb and today so far she ate 223 g of carb.  Her tube feeds provide 149g of carb.  BG ran from 38 to 295. Clearly lantus 4 units is inadequate( this is the amount she had for the last 2 admission) yet lantus 18 units caused hypoglycemia but the tube feeds had not been started again.  Her last admission she only required 4 units of lantus and 3/4 of NPH. Gave her 10 units of NPH today and then give her 12 units of lantus tonight  and nph 5.     Discharge Planning: (tentative)  Medications: TBD    Test Claims: TBD none needed.   Education:  Needs to be assessed closer to discharge.    Outpatient Follow-up:  [facility] Endocrinology: Libby Jay RN- just saw on 5/13/2025 and 8/21/25 with Dr. Maher    Please notify Inpatient Diabetes Service if changes are planned to steroids, nutrition, TPN/TF and anticipated procedures requiring prolonged NPO status.         Interval History/Review of Systems :   The last 24 hours progress and nursing notes reviewed.  Still having some abdominal pain.  She says it hurts when she eats but she continues to eat high carb food. She has some nausea.    Planned Procedures/Surgeries: none    Inpatient Glucose Control:       Recent Labs   Lab 05/17/25  0550 05/17/25  0233 05/16/25  2203 05/16/25  1904 05/16/25  1819 05/16/25  1254   * 234* 227* 241* 295* 145*             Medications Impacting Glycemia:   Steroids: PTA hydrocortisone 10 mg am, 15 mg HS (adrenal insufficiency)    D5W containing solutions/medications: d10 at 25 ml/hour, titrate to keep over 110  Other medications impacting glucose: none         Nutrition:   Orders Placed This Encounter      Regular Diet Adult    Supplements:  none  TF: Was on KF peptide 1.5, provides 149g of carbs or 6.2 g of cho per hour TF running at 45 ml and continuous -was on TF prior to admission  TPN: none         Diabetes History: see full consult note for complete diabetes history   Diabetes Type and Duration: Pancreatogenic diabetes s/p total pancreatectomy and islet autotransplant with insulin dependence since 1/2012  GAD65 antibody, zinc transporter 8 antibody, islet antibody, insulin antibody not available on epic search.     Numerous C-peptides:  Component      Latest Ref Rng 8/28/2018  6:47 AM 9/3/2018  6:41 PM 9/6/2018  8:00 AM 9/7/2018  6:55 AM 4/18/2019  11:04 AM 4/3/2025  2:01 PM   C-Peptide      0.9 - 6.9 ng/mL 0.3 (L)  0.2 (L)  1.8  2.8  1.8  0.5  (L)    Patient Fasting?           Yes      PTA Medication Regimen:   She says she was taking what her sheet said that she given on last discharge:  She was discharged on:     Long-acting insulin: glargine (Lantus) - take 4 units once daily at 6pm. Take at same time each day.     2) Rapid-acting insulin: aspart (Novolog) carbohydrate coverage/mealtime insulin - take 1 unit per 14 grams of carbohydrates before meals and snacks.     3) Rapid-acting insulin: aspart (Novolog) correction - see chart below. Take for high blood glucoses three times daily before meals when eating (or every 4-6 hours when receiving tube feeding) and at bedtime.  You may add the correction dose to the carbohydrate coverage/mealtime insulin dose and give in one injection--ideally 10-15 minutes before a meal.  You may take the correction dose even if you skip a meal (as long as it has been 4 hours since previous correction dose).      Blood Glucose Insulin Before Meals When Eating or every 4-6 hours when receiving tube feeding:   Less than 175 0 units   175 -224  1 units   225 - 274 2 units   275 - 324 3 units   325 - 374 4 units    375 - 424  5 units   425 - 474 6 units   475 or more 7 units         4) Intermediate-acting insulin: Novolin N (NPH). Take to cover tube feeds (dosed for Sagrario Marinelli at 45 ml/hr)   - Take Novolin N (NPH) 4 units at 9AM   - Take Novolin N (NPH) 3 units at 9PM      (If your rate of tube feed were to decrease, you can reference the list below)  If TF rate at 20 ml per hour, give 1 units  If TF rate at 30 ml per hour, give 3 units  If TF rate from 40-45 ml/hr, give 5 units  Missing doses?: denies  Historical Diabetes Medications: MDI, insulin pumps     When she was in [place] during the winter and was discharged on TF:   - Lantus 18 units AM, 12 units PM (Covering TF)   - Novolog ICR 1:16  - Novolog correction 1:35 (more intuitive dosing        Glucose monitoring device/frequency/trends: Dexcom was put on her on 5/13/2025  "by diabetes ed       Her glucose meter(checking 4-6 times daily) running 400 to \"high\" for the past 3 days since she has been sick.      Outpatient Diabetes Provider: Dr Maher  Formal Diabetes Education/Educator: Libby Jay, diabetes ed saw her on 5/13/2025, sara GanVikas saw her on 4/21/2025        Physical Exam:   /61 (BP Location: Left arm)   Pulse 92   Temp 97.2  F (36.2  C) (Oral)   Resp 16   SpO2 97%   General:   in no acute distress. Very thin.  HEENT:  NC/AT.   Lungs: Remains on room air with no work of breathing  ABD:  rounded, slt tender and firmer  Skin:  warm and dry,  MSK:   moving all extremities  Lymp:   + LE edema  Mental Status:  Alert and oriented x3  Psych:   Cooperative, good eye contact, full/appropriate affect           Data:     Lab Results   Component Value Date    A1C 15.8 (H) 04/30/2025    A1C 19.1 (H) 04/15/2025    A1C 18.9 (H) 04/03/2025    A1C 17.1 (H) 01/23/2025    A1C 11.1 (H) 03/02/2023    A1C 7.3 (H) 11/09/2020    A1C 6.7 (A) 11/21/2019    A1C 8.2 (H) 06/11/2019    A1C Canceled, Test credited 06/10/2019    A1C 9.6 (H) 04/18/2019       ROUTINE IP LABS (Last four results)  BMP  Recent Labs   Lab 05/17/25  0550 05/17/25  0233 05/16/25  2203 05/16/25  1904 05/16/25  1819 05/16/25  1708 05/16/25  0650 05/16/25  0617 05/15/25  1332 05/15/25  1054 05/13/25  1213   NA  --   --   --   --   --   --   --  139  --  137 131*   POTASSIUM  --   --   --   --   --  4.8  --  3.3*  --  4.0 4.1   CHLORIDE  --   --   --   --   --   --   --  101  --  94* 93*   ELIZA  --   --   --   --   --   --   --  9.0  --  9.3 9.2   CO2  --   --   --   --   --   --   --  28  --  28 23   BUN  --   --   --   --   --   --   --  4.1*  --  3.6* 19.5   CR  --   --   --   --   --   --   --  0.43*  --  0.41* 3.05*   * 234* 227* 241*   < >  --    < > 55*   < > 571* 410*    < > = values in this interval not displayed.     CBC  Recent Labs   Lab 05/16/25  0617 05/15/25  1054 05/13/25  1213   WBC 10.8 8.0 " 10.7   RBC 3.60* 3.89 3.71*   HGB 11.6* 12.3 11.9   HCT 33.5* 36.5 33.6*   MCV 93 94 91   MCH 32.2 31.6 32.1   MCHC 34.6 33.7 35.4   RDW 12.8 12.6 12.4   * 699* 674*     INRNo lab results found in last 7 days.    Inpatient Diabetes Service will continue to follow, please don't hesitate to contact the team with any questions or concerns.     Samantha Pavon PA-C  Inpatient Diabetes Service  [phone]  Available by [employer]  Date of Service: 5/17/2025    Plan discussed with patient, bedside RN in person, and primary team via this note.    To contact Inpatient Diabetes Service:     7 AM - 5 PM: Page the [facility] DAVID following the patient that day (see filed or incomplete progress notes/consult notes under Endocrinology)    OR if uncertain of provider assignment: page job code [phone]    5 PM - 7 AM: First call after hours is to primary service.    For urgent after-hours questions, page job code for on call fellow: [phone]     I spent a total of 50 minutes on the date of the encounter doing prep/post-work, chart review, history and exam, documentation and further activities per the note including lab review, multidisciplinary communication, counseling the patient and/or coordinating care regarding acute hyper/hypoglycemic management, as well as discharge management and planning/communication.

## 2025-05-17 NOTE — PLAN OF CARE
VS: BP 94/63   Pulse 87   Temp 97.7  F (36.5  C) (Oral)   Resp 16   SpO2 95%      Neuro: WNL   CV: Soft BP LR bolus given this AM, recheck was WNL, soft, PRN LR available if BP meets parameters.   Pulm: WNL   GI: Started gravity venting in G tube  Changed Relizorb device at 1500, next change in Am  Sagrario rodriguez @ goal 45 ml  Large stool burden on 5/15 CT. Continue bowel reg.   : Up to toilet independently   Pain: Managed with dilaudid, oxy, flexeril, tylenol and GI meds  Unable to try and wean meds per MD and consult recs. Pain not well controlled this afternoon   Activity level: Independent, call for assistance moving around IV pump   Lines/ Drips: Continuous TF

## 2025-05-17 NOTE — PROGRESS NOTES
[facility]    Medicine Progress Note - Hospitalist Service       Date of Admission:  5/15/2025  Assessment & Plan     Chantell Kidd is a 59 year old female admitted on 5/15/2025. She has PMH of insulin-dependent diabetes mellitus after pancreatectomy, chronic pancreatitis, migraine, hypothyroidism, and venessa-en-Y with G tube nutrition who presented due to abdominal and back pain, will be admitted to medicine team for management.    Today's changes:   5/17/2025    Blood pressure low this morning:  ml bolus x 1.  Increase free water flushes via J tube 200 ml Q 4hr  Insulin management per endocrine  Appreciate GI consultation.  Follow-up GI workup  G tube drainage to gravity as needed for-increased abdominal pain, distention.  Continue aggressive bowel regimen including suppository, enema as needed-having liquidy BM per pt.   Continue current tube feeding  Regular diet as tolerated-  LFT improving-monitor  Minimize narcotics as able.  Patient counseled.    Dennys patient. RN.        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 Admitted 4/16-2/24 as well as 4/30-5/6 with PEG-J repositioned/replaced by GI on 4/17 as well as 5/2. She presented again 5/15 due to acute worsening of abdominal pain, worse with TF though she has been able to continue them. Reports fever x1 earlier in the week and has since been using Tylenol without additional fever. Note she has been using Tylenol 1000mg q6 for the last month. Vitals on admit with mild tachycardia to 103, temp 37.2, BP WNL. Overall improved after IVF administration and pain control. CT AP w/o on admit with no acute abdominal findings, GJ in place. Labs concerning for mildly elevated LFTs with , ALT 89, Alk phos 152. Tbili 0.3. WBC WNL.  Patient feels unable to adequately manage abdominal pain at home at this time; previously required IP Pain team assistance with plan to establish OP in the coming months.   -- GI consulted, appreciate recommendations              > No urgent need for procedures at this time,              > Hepatic US:suggestive   of steatosis and chronic parenchymal liver disease. central pneumobilia, stable compared to   CT 5/15/2025.       -- Trend CMP daily  -- Pain plan:               > Pain team consulted, appreciate recommendations.       Flexeril 5-10mg PO Q 8 hours PRN.  Oxycodone 5-10mg PO Q 4 hours PRN                    To taper,   oxycodone 5-10mg PO Q 4 hours PRN x 1 day then decrease to Q 6 hours PRN x 1day then to Q 8 hours PRN x 1 day then to Q 12 hours PRN x 1day then to 1/day PRN x 1 day and STOP.      Minimize IV Dilaudid 0.2-0.4mg IV Q 4 hours PRN x 24 hours.  If no new acute pathology causing pain, would discontinue.      Simethicone as needed  Lidocaine patches 1-3 patches Q 24 hours, 12 hours on and 12 hours off  Menthol patches, 1 patch TD Q 8 hours  Aggressive bowel regimen   Keep pain clinic appt. On 6/10/2025 with Dr. Kapadia.    -- Continue PTA Creon 96,000U TID   -- Continue PTA famotidine, protonix, Linzess, reglan and sucralfate  -- Zofran PRN for nausea   -- MiraLAX 3 times daily, senna Colace twice daily.  Dulcolax and tapwater enema as needed.    GI consultation 5/16: Recommendations reviewed.  Appreciate assistance.     - Would check viral hepatitis panel, ERLINDA, smooth muscle antibodies, IgG, AMA, ceruloplasmin, iron studies, PETh, celiac labs, A1AT (ordered).  - Further evaluation with US liver. Can consider fibroscan outpatient with PCP to assess for liver fibrosis.   - Continue J feeds as tolerated. Use J tube for feeding, G tube for venting.   - Consider Reglan 10 mg IV TID for nausea.   - Consider promethazine q6h PRN as well.  - Continue PERT, PPI therapy.  - Minimize narcotic use.  - Continue  bowel regimen.       C/f recent ANGELINA, improved  Patient had labs with PCP during hospital follow up appointment on 5/13 during which her Cr was 3.05, up from her baseline of ~0.4. She presented on 5/15 to the ED with abdominal pain that radiates to the back. Also complaining of increasing urinary frequency and mild dysuria. Cr on admit back to baseline of 0.41. UA negative for signs of infection, though with significant glucosuria which may be contributing to symptoms. Question outpatient lab accuracy.  -- Daily BMP  -- Avoid nephrotoxins as able  -- I/O     Post-pancreatectomy diabetes (Type 1)  Chronic Pancreatitis and Sphincter of Oddi dysfunction s/p TPAIT (1/2012)  Patient has poorly controlled diabetes with multiple presentations with glucose >700. Today, presented with BG of 571. Most recent HbA1c of 15.8 4/30/25. No anion gap, Ketones <0.18. In the ED, she was given 1L IVF and glucose improved to 354. Has previously been seen by inpatient endocrinology team with concern for patient not using insulin at home, though she reports taking her insulin as directed prior to admission. Most recent OP Endo visit was on 5/13. Home regimen includes Lantus 18U qAM and at bedtime, NPH 8U qAM, and Novolog sliding scale, and ICR 1U:12CHO with meals. Most readings at home are significantly elevated and this has been a target of outpatient management.   -- Endocrinology consulted, appreciate recommendations  -- Diabetes regimen per endocrine.  -- Continue PTA Creon 96,000U TID with meals         BLE Edema  Has been ongoing for several months, she notes being started on Lasix 20mg daily and this was increased to 40mg BID by provider in [place] in the fall. She had BLE US during most recent admission 4/30/25 that was negative for DVT and confirmed soft tissue edema bilaterally. Over the last couple weeks, left leg is slightly more swollen than the right. No history of injury, fracture, or surgery on this leg before. No erythema or  wounds.  Better   -- holding PTA Lasix 40mg BID  -- GAIL hose during the day     Malnutrition   Cachexia   GJ in place with continuous TF at 45ml/hour. Reports she has been able to continue at this rate despite abdominal discomfort.   -- Nutrition consulted              > Continue Nutren 1.5 @ 40 ml/hr, will hold at midnight for hepatic US     History of adrenal insufficiency  Followed by Dr. Maher. Previously suppressed AM cortisol and has been on empiric therapy for possible adrenal insufficiency, unclear if central vs transient. Had previously been unable to wean steroids due to hypoglycemia episodes. Most recently saw Dr Maher 4/3/25 and goal is to retest in the near future with low dose ACTH stimulation testing.  -- Continue PTA hydrocortisone 10mg in the AM and 15mg in the PM   -- monitor.      Hypothyroidism  -- Continue PTA levothyroxine      MDD, Anxiety  Insomnia  -- Continue PTA duloxetine, trazodone, topiramate     Diet: Regular Diet Adult  Adult Formula Drip Feeding: Continuous [place] Peptide 1.5; Jejunostomy; Goal Rate: 45 ml/hr. Please see Relizorb orders. Please abide by 8 hr hang time for open systems; mL/hr    DVT Prophylaxis: Pneumatic Compression Devices  Mckee Catheter: Not present  Code Status: Full Code      Disposition Plan     Expected Discharge Date: 05/19/2025      Destination: home with family;home with help/services       Entered: Sean Benítez MD 05/17/2025, 6:20 PM         Sean Benítez MD  Hospitalist Service  [facility]    ______________________________________________________________________    Interval History     See above.  Ongoing pain and bloating abdomen, diffuse- stable.   Nausea - better.     No vomiting.    No fever.    Had liquidy bm today.   No cough or cp or sob.     Data reviewed today: I reviewed all medications, new labs and imaging results over the last 24 hours.    Physical Exam   Vital Signs: Temp: 97.7  F  (36.5  C) Temp src: Oral BP: 117/72 Pulse: 87   Resp: 16 SpO2: 96 % O2 Device: None (Room air)    Weight: 0 lbs 0 oz    General Appearance: Awake, interactive, NAD  HEENT: AT/NC, Anicteric, Moist MM  Respiratory: Normal work of breathing. RA  Cardiovascular: S1 S2 Regular.  GI/Abd: Soft. Diffuse ttp mostly upper abdomen. PEG/J tube + no g.r  Extremities: Distally wwp.   Neuro: AO x 4, Grossly non focal.   Skin: No acute rash on exposed areas.   Psychiatry: Stable mood.     Data   Recent Labs   Lab 05/17/25  1809 05/17/25  1338 05/17/25  0944 05/17/25  0701 05/16/25  1819 05/16/25  1708 05/16/25  0650 05/16/25  0617 05/15/25  1332 05/15/25  1054   WBC  --   --   --  8.8  --   --   --  10.8  --  8.0   HGB  --   --   --  11.4*  --   --   --  11.6*  --  12.3   MCV  --   --   --  94  --   --   --  93  --  94   PLT  --   --   --  625*  --   --   --  636*  --  699*   NA  --   --   --  133*  --   --   --  139  --  137   POTASSIUM  --   --   --  4.3  --  4.8  --  3.3*  --  4.0   CHLORIDE  --   --   --  98  --   --   --  101  --  94*   CO2  --   --   --  26  --   --   --  28  --  28   BUN  --   --   --  12.7  --   --   --  4.1*  --  3.6*   CR  --   --   --  0.49*  --   --   --  0.43*  --  0.41*   ANIONGAP  --   --   --  9  --   --   --  10  --  15   ELIZA  --   --   --  8.6*  --   --   --  9.0  --  9.3   * 260* 334* 306*   < >  --    < > 55*   < > 571*   ALBUMIN  --   --   --  3.3*  --   --   --  3.4*  --  4.0   PROTTOTAL  --   --   --  6.0*  --   --   --  6.4  --  7.0   BILITOTAL  --   --   --  0.2  --   --   --  0.2  --  0.3   ALKPHOS  --   --   --  132  --   --   --  126  --  152*   ALT  --   --   --  65*  --   --   --  55*  --  89*   AST  --   --   --  90*  --   --   --  45  --  227*    < > = values in this interval not displayed.       Imaging results reviewed over the past 24 hrs:   No results found for this or any previous visit (from the past 24 hours).    Medications   Current Facility-Administered Medications    Medication Dose Route Frequency Provider Last Rate Last Admin    dextrose 10% infusion   Intravenous Continuous PRN Samantha Pavon PA-C         Current Facility-Administered Medications   Medication Dose Route Frequency Provider Last Rate Last Admin    acetaminophen (TYLENOL) tablet 650 mg  650 mg Oral or Feeding Tube TID Braxton Banuelos MD   650 mg at 05/17/25 1330    Or    acetaminophen (TYLENOL) Suppository 650 mg  650 mg Rectal TID Braxton Banuelos MD        DULoxetine (CYMBALTA) DR capsule 90 mg  90 mg Oral Daily Shivani Simon PA-C   90 mg at 05/17/25 0853    famotidine (PEPCID) tablet 20 mg  20 mg Oral or Feeding Tube At Bedtime Braxton Banuelos MD   20 mg at 05/16/25 2253    [Held by provider] furosemide (LASIX) tablet 40 mg  40 mg Oral or Feeding Tube BID Braxton Banuelos MD   40 mg at 05/16/25 1654    gabapentin (NEURONTIN) capsule 400 mg  400 mg Oral TID Shivani Simon PA-C   400 mg at 05/17/25 1331    hydrocortisone (CORTEF) tablet 10 mg  10 mg Oral QAM Shivani Simon PA-C   10 mg at 05/17/25 0853    hydrocortisone (CORTEF) tablet 15 mg  15 mg Oral QPM Shivani Simon PA-C   15 mg at 05/16/25 2059    insulin aspart (NovoLOG) injection (RAPID ACTING)  1-7 Units Subcutaneous TID  Samantha Pavon PA-C   4 Units at 05/17/25 1810    insulin aspart (NovoLOG) injection (RAPID ACTING)  1-5 Units Subcutaneous 2 times per day Samantha Pavon PA-C        insulin aspart (NovoLOG) injection (RAPID ACTING)   Subcutaneous TID w/meals Samantha Pavon PA-C   10 Units at 05/17/25 1342    insulin glargine (LANTUS PEN) injection 12 Units  12 Units Subcutaneous Q24H Samantha Pavon PA-C        insulin NPH injection 10 Units  10 Units Subcutaneous Q24H Samantha Pavon PA-C   10 Units at 05/17/25 0905    insulin NPH injection 5 Units  5 Units Subcutaneous Q24H Samantha Pavon PA-C        levothyroxine (SYNTHROID/LEVOTHROID) tablet 125 mcg  125 mcg Oral [facility] Shivani Simon PA-C   125 mcg at 05/17/25 0854    Lidocaine  (LIDOCARE) 4 % Patch 1-2 patch  1-2 patch Transdermal Q24h Sean Benítez MD        linaclotide (LINZESS) capsule 145 mcg  145 mcg Oral QAM  Shivani Simon PA-C   145 mcg at 05/17/25 0855    lipase-protease-amylase (CREON 12) [id number] units per capsule 8 capsule  8 capsule Oral TID w/meals Shivani Simon PA-C   8 capsule at 05/17/25 1334    metoclopramide (REGLAN) tablet 10 mg  10 mg Oral TID Shivani Simon PA-C   10 mg at 05/17/25 1331    pantoprazole (PROTONIX) EC tablet 40 mg  40 mg Oral BID Shivani Simon PA-C   40 mg at 05/17/25 0852    polyethylene glycol (MIRALAX) Packet 17 g  17 g Oral or Feeding Tube TID Braxton Banuelos MD   17 g at 05/17/25 1332    potassium chloride minerva ER (KLOR-CON M20) CR tablet 20 mEq  20 mEq Oral Daily Shivani Simon PA-C   20 mEq at 05/17/25 0854    senna-docusate (SENOKOT-S/PERICOLACE) 8.6-50 MG per tablet 2 tablet  2 tablet Oral or Feeding Tube BID Braxton Banuelos MD        Or    senna-docusate (SENOKOT-S/PERICOLACE) 8.6-50 MG per tablet 2 tablet  2 tablet Oral or Feeding Tube BID Braxton Banuelos MD   2 tablet at 05/17/25 0851    simethicone (MYLICON) chewable tablet 80 mg  80 mg Oral 4x Daily Sean Benítez MD   80 mg at 05/17/25 1659    sodium chloride (PF) 0.9% PF flush 3 mL  3 mL Intracatheter Q8H [facility] Shivani Simon PA-C   3 mL at 05/17/25 1332    sucralfate (CARAFATE) tablet 1 g  1 g Oral 4x Daily  &  Shivani Simon PA-C   1 g at 05/17/25 1659    thiamine (B-1) tablet 100 mg  100 mg Oral or Feeding Tube Daily Braxton Banuelos MD   100 mg at 05/17/25 0857    topiramate (TOPAMAX) tablet 100 mg  100 mg Oral or Feeding Tube BID Braxton Banuelos MD   100 mg at 05/17/25 0852    traZODone (DESYREL) tablet 200 mg  200 mg Oral or Feeding Tube QPM Braxtno Banuelos MD   200 mg at 05/16/25 6147

## 2025-05-18 LAB
ALBUMIN SERPL BCG-MCNC: 3.3 G/DL (ref 3.5–5.2)
ALP SERPL-CCNC: 119 U/L (ref 40–150)
ALT SERPL W P-5'-P-CCNC: 53 U/L (ref 0–50)
ANION GAP SERPL CALCULATED.3IONS-SCNC: 9 MMOL/L (ref 7–15)
AST SERPL W P-5'-P-CCNC: 49 U/L (ref 0–45)
BILIRUB SERPL-MCNC: <0.2 MG/DL
BUN SERPL-MCNC: 15.1 MG/DL (ref 8–23)
CALCIUM SERPL-MCNC: 8.6 MG/DL (ref 8.8–10.4)
CHLORIDE SERPL-SCNC: 101 MMOL/L (ref 98–107)
CREAT SERPL-MCNC: 0.42 MG/DL (ref 0.51–0.95)
EGFRCR SERPLBLD CKD-EPI 2021: >90 ML/MIN/1.73M2
ERYTHROCYTE [DISTWIDTH] IN BLOOD BY AUTOMATED COUNT: 13 % (ref 10–15)
GLUCOSE BLDC GLUCOMTR-MCNC: 105 MG/DL (ref 70–99)
GLUCOSE BLDC GLUCOMTR-MCNC: 139 MG/DL (ref 70–99)
GLUCOSE BLDC GLUCOMTR-MCNC: 155 MG/DL (ref 70–99)
GLUCOSE BLDC GLUCOMTR-MCNC: 170 MG/DL (ref 70–99)
GLUCOSE BLDC GLUCOMTR-MCNC: 264 MG/DL (ref 70–99)
GLUCOSE SERPL-MCNC: 218 MG/DL (ref 70–99)
HCO3 SERPL-SCNC: 25 MMOL/L (ref 22–29)
HCT VFR BLD AUTO: 32.2 % (ref 35–47)
HGB BLD-MCNC: 10.7 G/DL (ref 11.7–15.7)
MCH RBC QN AUTO: 31.3 PG (ref 26.5–33)
MCHC RBC AUTO-ENTMCNC: 33.2 G/DL (ref 31.5–36.5)
MCV RBC AUTO: 94 FL (ref 78–100)
PLATELET # BLD AUTO: 573 10E3/UL (ref 150–450)
POTASSIUM SERPL-SCNC: 4.1 MMOL/L (ref 3.4–5.3)
PROT SERPL-MCNC: 5.8 G/DL (ref 6.4–8.3)
RBC # BLD AUTO: 3.42 10E6/UL (ref 3.8–5.2)
SMA IGG SER-ACNC: 2 UNITS
SODIUM SERPL-SCNC: 135 MMOL/L (ref 135–145)
WBC # BLD AUTO: 9.9 10E3/UL (ref 4–11)

## 2025-05-18 PROCEDURE — 250N000013 HC RX MED GY IP 250 OP 250 PS 637: Performed by: STUDENT IN AN ORGANIZED HEALTH CARE EDUCATION/TRAINING PROGRAM

## 2025-05-18 PROCEDURE — 36415 COLL VENOUS BLD VENIPUNCTURE: CPT

## 2025-05-18 PROCEDURE — 99232 SBSQ HOSP IP/OBS MODERATE 35: CPT | Performed by: INTERNAL MEDICINE

## 2025-05-18 PROCEDURE — 85014 HEMATOCRIT: CPT

## 2025-05-18 PROCEDURE — 250N000013 HC RX MED GY IP 250 OP 250 PS 637

## 2025-05-18 PROCEDURE — 250N000013 HC RX MED GY IP 250 OP 250 PS 637: Performed by: INTERNAL MEDICINE

## 2025-05-18 PROCEDURE — 250N000011 HC RX IP 250 OP 636: Mod: JZ | Performed by: INTERNAL MEDICINE

## 2025-05-18 PROCEDURE — S9342 HIT ENTERAL PUMP DIEM: HCPCS

## 2025-05-18 PROCEDURE — 82040 ASSAY OF SERUM ALBUMIN: CPT

## 2025-05-18 PROCEDURE — 120N000002 HC R&B MED SURG/OB UMMC

## 2025-05-18 PROCEDURE — 99232 SBSQ HOSP IP/OBS MODERATE 35: CPT | Performed by: PHYSICIAN ASSISTANT

## 2025-05-18 RX ORDER — MAGNESIUM CARB/ALUMINUM HYDROX 105-160MG
296 TABLET,CHEWABLE ORAL ONCE
Status: COMPLETED | OUTPATIENT
Start: 2025-05-18 | End: 2025-05-18

## 2025-05-18 RX ADMIN — SUCRALFATE 1 G: 1 TABLET ORAL at 08:40

## 2025-05-18 RX ADMIN — SUCRALFATE 1 G: 1 TABLET ORAL at 12:23

## 2025-05-18 RX ADMIN — PANCRELIPASE 8 CAPSULE: 60000; 12000; 38000 CAPSULE, DELAYED RELEASE PELLETS ORAL at 19:26

## 2025-05-18 RX ADMIN — METOCLOPRAMIDE 10 MG: 10 TABLET ORAL at 19:44

## 2025-05-18 RX ADMIN — SIMETHICONE 80 MG: 80 TABLET, CHEWABLE ORAL at 08:40

## 2025-05-18 RX ADMIN — SIMETHICONE 80 MG: 80 TABLET, CHEWABLE ORAL at 19:43

## 2025-05-18 RX ADMIN — SENNOSIDES AND DOCUSATE SODIUM 2 TABLET: 50; 8.6 TABLET ORAL at 19:42

## 2025-05-18 RX ADMIN — SIMETHICONE 80 MG: 80 TABLET, CHEWABLE ORAL at 12:25

## 2025-05-18 RX ADMIN — DULOXETINE 90 MG: 60 CAPSULE, DELAYED RELEASE ORAL at 08:39

## 2025-05-18 RX ADMIN — LEVOTHYROXINE SODIUM 125 MCG: 125 TABLET ORAL at 08:41

## 2025-05-18 RX ADMIN — PANCRELIPASE 8 CAPSULE: 60000; 12000; 38000 CAPSULE, DELAYED RELEASE PELLETS ORAL at 08:42

## 2025-05-18 RX ADMIN — PANTOPRAZOLE SODIUM 40 MG: 40 TABLET, DELAYED RELEASE ORAL at 08:41

## 2025-05-18 RX ADMIN — TRAZODONE HYDROCHLORIDE 200 MG: 100 TABLET ORAL at 19:43

## 2025-05-18 RX ADMIN — HYDROMORPHONE HYDROCHLORIDE 0.4 MG: 0.2 INJECTION, SOLUTION INTRAMUSCULAR; INTRAVENOUS; SUBCUTANEOUS at 14:32

## 2025-05-18 RX ADMIN — HYDROMORPHONE HYDROCHLORIDE 0.4 MG: 0.2 INJECTION, SOLUTION INTRAMUSCULAR; INTRAVENOUS; SUBCUTANEOUS at 20:58

## 2025-05-18 RX ADMIN — ACETAMINOPHEN 650 MG: 325 TABLET, FILM COATED ORAL at 19:44

## 2025-05-18 RX ADMIN — HYDROMORPHONE HYDROCHLORIDE 0.4 MG: 0.2 INJECTION, SOLUTION INTRAMUSCULAR; INTRAVENOUS; SUBCUTANEOUS at 08:23

## 2025-05-18 RX ADMIN — ACETAMINOPHEN 650 MG: 325 TABLET, FILM COATED ORAL at 14:26

## 2025-05-18 RX ADMIN — CYCLOBENZAPRINE HYDROCHLORIDE 5 MG: 5 TABLET, FILM COATED ORAL at 13:29

## 2025-05-18 RX ADMIN — POLYETHYLENE GLYCOL 3350 17 G: 17 POWDER, FOR SOLUTION ORAL at 19:44

## 2025-05-18 RX ADMIN — METOCLOPRAMIDE 10 MG: 10 TABLET ORAL at 14:26

## 2025-05-18 RX ADMIN — SUCRALFATE 1 G: 1 TABLET ORAL at 16:00

## 2025-05-18 RX ADMIN — PANCRELIPASE 8 CAPSULE: 60000; 12000; 38000 CAPSULE, DELAYED RELEASE PELLETS ORAL at 12:24

## 2025-05-18 RX ADMIN — GABAPENTIN 400 MG: 400 CAPSULE ORAL at 19:44

## 2025-05-18 RX ADMIN — SIMETHICONE 80 MG: 80 TABLET, CHEWABLE ORAL at 16:00

## 2025-05-18 RX ADMIN — POLYETHYLENE GLYCOL 3350 17 G: 17 POWDER, FOR SOLUTION ORAL at 08:38

## 2025-05-18 RX ADMIN — ACETAMINOPHEN 650 MG: 325 TABLET, FILM COATED ORAL at 08:40

## 2025-05-18 RX ADMIN — PANTOPRAZOLE SODIUM 40 MG: 40 TABLET, DELAYED RELEASE ORAL at 19:43

## 2025-05-18 RX ADMIN — METOCLOPRAMIDE 10 MG: 10 TABLET ORAL at 08:38

## 2025-05-18 RX ADMIN — POLYETHYLENE GLYCOL 3350 17 G: 17 POWDER, FOR SOLUTION ORAL at 14:26

## 2025-05-18 RX ADMIN — LINACLOTIDE 145 MCG: 145 CAPSULE, GELATIN COATED ORAL at 08:56

## 2025-05-18 RX ADMIN — FAMOTIDINE 20 MG: 20 TABLET, FILM COATED ORAL at 22:54

## 2025-05-18 RX ADMIN — SENNOSIDES AND DOCUSATE SODIUM 2 TABLET: 50; 8.6 TABLET ORAL at 08:41

## 2025-05-18 RX ADMIN — POTASSIUM CHLORIDE 20 MEQ: 1500 TABLET, EXTENDED RELEASE ORAL at 08:40

## 2025-05-18 RX ADMIN — GABAPENTIN 400 MG: 400 CAPSULE ORAL at 08:39

## 2025-05-18 RX ADMIN — TOPIRAMATE 100 MG: 50 TABLET, FILM COATED ORAL at 19:44

## 2025-05-18 RX ADMIN — OXYCODONE 10 MG: 5 TABLET ORAL at 19:43

## 2025-05-18 RX ADMIN — THIAMINE HCL TAB 100 MG 100 MG: 100 TAB at 08:41

## 2025-05-18 RX ADMIN — HYDROCORTISONE 15 MG: 10 TABLET ORAL at 19:43

## 2025-05-18 RX ADMIN — SUCRALFATE 1 G: 1 TABLET ORAL at 22:54

## 2025-05-18 RX ADMIN — TOPIRAMATE 100 MG: 50 TABLET, FILM COATED ORAL at 08:41

## 2025-05-18 RX ADMIN — HYDROCORTISONE 10 MG: 10 TABLET ORAL at 08:41

## 2025-05-18 RX ADMIN — GABAPENTIN 400 MG: 400 CAPSULE ORAL at 14:26

## 2025-05-18 RX ADMIN — OXYCODONE 10 MG: 5 TABLET ORAL at 12:23

## 2025-05-18 ASSESSMENT — ACTIVITIES OF DAILY LIVING (ADL)
ADLS_ACUITY_SCORE: 58
ADLS_ACUITY_SCORE: 55
ADLS_ACUITY_SCORE: 58
ADLS_ACUITY_SCORE: 58
ADLS_ACUITY_SCORE: 55
ADLS_ACUITY_SCORE: 55
ADLS_ACUITY_SCORE: 58
ADLS_ACUITY_SCORE: 58
ADLS_ACUITY_SCORE: 55
ADLS_ACUITY_SCORE: 58
ADLS_ACUITY_SCORE: 55
ADLS_ACUITY_SCORE: 55
ADLS_ACUITY_SCORE: 58
ADLS_ACUITY_SCORE: 58
ADLS_ACUITY_SCORE: 55
ADLS_ACUITY_SCORE: 55
ADLS_ACUITY_SCORE: 56
ADLS_ACUITY_SCORE: 58
ADLS_ACUITY_SCORE: 56

## 2025-05-18 NOTE — PLAN OF CARE
VS: /75   Pulse 93   Temp 97.8  F (36.6  C) (Oral)   Resp 14   Wt 47.8 kg (105 lb 6.1 oz)   SpO2 95%   BMI 19.27 kg/m     O2: To room  air, denies SOB and chest pain   Output: Continent both bowel and bladder   Last BM: 5-   Activity: SBA with walker   Skin: Scattered bruising on BUE  Insertion sites for J and G tube   Pain: On PRN oxycodone and IV dilaudid   CMS: A&Ox4, with baseline numbness and tingling on all 4s   Dressing: J and G tube insertion site, changed this shift, CDI   Diet: Regular diet  On continuous tube feeding   LDA: PIV on on R forearm saline locked  Gtube to drain bag  Jtube on continuous feeding   Equipment: Personal items, call light, IV pole, feeding pump   Plan: Pending, continue POC   Additional Info:

## 2025-05-18 NOTE — PLAN OF CARE
Patient A/Ox4, pranay lady. VSS. Denies CP, SOB, dizziness/LH. LSCTA. +fl/BS. Voiding well in bathroom. CMS intact. Dressings to abdomen CDI. Tolerating regular diet without NV, also has continuous TF running. Activity level is good, pt walked to bathroom a number of times. IV SL. Pain rated as 7/10 or so throughout shift, managed with IV dilaudid x1. Per report, possible discharge Monday back home with home health services. Patient has demonstrated ability to call appropriately. Patient is resting with call light within reach. Will continue to monitor.

## 2025-05-18 NOTE — PROGRESS NOTES
Inpatient Diabetes Management Service: Daily Progress Note     HPI: Chantell Kidd is a 59 year old female admitted on 5/15/2025. 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  Inpatient Diabetes Service consulted for hyperglycemia and then hypoglycemia.          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 Poor control  (A1c 15.8 %  4/30/2025, Hgb: 10.5)  Enteral Feed/Steroid induced  Hyperglycemia  Abdominal pain     Plan/Recommendations:    -  Lantus 12 units every 24 hours at 21:00   -Give NPH 10 units every 24 hours at 8 am for the tube feeds, 45 ml/hour ( decrease from 10 units)  -Increase NPH 8 units at 8 pm to cover KF TF running at 45 ml/hour      - Continue Novolog Meal Coverage: 1 units per 12 g CHO, TID AC and 1:15  PRN with snacks/supplements   - BG monitoring:  before meals, bedtime, and 2 am  - Continue correction scale (ISF 50) before meals, bedtime, and 2 am  -PRN D10W will be needed to prevent hypoglycemia in case of unexpected TF interruption: 50 ml/h will provide 75% of the carbs in       - Hypoglycemia protocol    - Carb counting protocol          Discussion:   BG trending down slightly.to 150 and 161.  OY=775 this am without eating.  Either TF not covered well or some Chantell phenomena.  She eat a lot of carbs yesterday despite TF.    Discharge Planning: (tentative)  Medications: TBD    Test Claims: TBD none needed.   Education:  Needs to be assessed closer to discharge.    Outpatient Follow-up:  recommend [facility] Endocrinology: Libby Jay, RN- just saw on 5/13/2025 and 8/21/25 with Dr. Maher    Please notify Inpatient Diabetes  Service if changes are planned to steroids, nutrition, TPN/TF and anticipated procedures requiring prolonged NPO status.         Interval History/Review of Systems :   The last 24 hours progress and nursing notes reviewed.  Having  abdominal pains.  She has some nausea but still trying to eat.  They are trying to vent her G tube and see if that helps with the abd pain.    Planned Procedures/Surgeries: none    Inpatient Glucose Control:       Recent Labs   Lab 05/18/25  0611 05/18/25  0150 05/17/25  2234 05/17/25  1826 05/17/25  1809 05/17/25  1338   * 155* 161* 295* 299* 260*             Medications Impacting Glycemia:   Steroids: PTA hydrocortisone 10 mg am, 15 mg HS (adrenal insufficiency)    D5W containing solutions/medications: d10 at 25 ml/hour, titrate to keep over 110, stopped  Other medications impacting glucose: none         Nutrition:   Orders Placed This Encounter      Regular Diet Adult    Supplements:  none  TF: Was on KF peptide 1.5, provides 149g of carbs or 6.2 g of cho per hour TF running at 45 ml and continuous -was on TF prior to admission  TPN: none         Diabetes History: see full consult note for complete diabetes history   Diabetes Type and Duration: Pancreatogenic diabetes s/p total pancreatectomy and islet autotransplant with insulin dependence since 1/2012  GAD65 antibody, zinc transporter 8 antibody, islet antibody, insulin antibody not available on epic search.     Numerous C-peptides:  Component      Latest Ref Rng 8/28/2018  6:47 AM 9/3/2018  6:41 PM 9/6/2018  8:00 AM 9/7/2018  6:55 AM 4/18/2019  11:04 AM 4/3/2025  2:01 PM   C-Peptide      0.9 - 6.9 ng/mL 0.3 (L)  0.2 (L)  1.8  2.8  1.8  0.5 (L)    Patient Fasting?           Yes      PTA Medication Regimen:   She says she was taking what her sheet said that she given on last discharge:  She was discharged on:     Long-acting insulin: glargine (Lantus) - take 4 units once daily at 6pm. Take at same time each day.     2)  "Rapid-acting insulin: aspart (Novolog) carbohydrate coverage/mealtime insulin - take 1 unit per 14 grams of carbohydrates before meals and snacks.     3) Rapid-acting insulin: aspart (Novolog) correction - see chart below. Take for high blood glucoses three times daily before meals when eating (or every 4-6 hours when receiving tube feeding) and at bedtime.  You may add the correction dose to the carbohydrate coverage/mealtime insulin dose and give in one injection--ideally 10-15 minutes before a meal.  You may take the correction dose even if you skip a meal (as long as it has been 4 hours since previous correction dose).      Blood Glucose Insulin Before Meals When Eating or every 4-6 hours when receiving tube feeding:   Less than 175 0 units   175 -224  1 units   225 - 274 2 units   275 - 324 3 units   325 - 374 4 units    375 - 424  5 units   425 - 474 6 units   475 or more 7 units         4) Intermediate-acting insulin: Novolin N (NPH). Take to cover tube feeds (dosed for [place] at 45 ml/hr)   - Take Novolin N (NPH) 4 units at 9AM   - Take Novolin N (NPH) 3 units at 9PM      (If your rate of tube feed were to decrease, you can reference the list below)  If TF rate at 20 ml per hour, give 1 units  If TF rate at 30 ml per hour, give 3 units  If TF rate from 40-45 ml/hr, give 5 units  Missing doses?: denies  Historical Diabetes Medications: MDI, insulin pumps     When she was in [place] during the winter and was discharged on TF:   - Lantus 18 units AM, 12 units PM (Covering TF)   - Novolog ICR 1:16  - Novolog correction 1:35 (more intuitive dosing        Glucose monitoring device/frequency/trends: Dexcom was put on her on 5/13/2025 by diabetes ed       Her glucose meter(checking 4-6 times daily) running 400 to \"high\" for the past 3 days since she has been sick.      Outpatient Diabetes Provider: Dr Maher  Formal Diabetes Education/Educator: Libby Jay, diabetes ed saw her on 5/13/2025, sara Bean saw " her on 4/21/2025        Physical Exam:   /73 (BP Location: Left arm)   Pulse 88   Temp 97.8  F (36.6  C) (Oral)   Resp 16   Wt 47.8 kg (105 lb 6.1 oz)   SpO2 97%   BMI 19.27 kg/m    General:   in no acute distress. Very thin.  HEENT:  NC/AT. Laying in bed  Lungs: Remains on room air with no work of breathing  ABD:  rounded, slt tender  and soft today.  Has FT (GJ) and G is drained to since last night  Skin:  warm and dry,  MSK:   moving all extremities  Lymp:   + LE edema  Mental Status:  Alert and oriented x3  Psych:   Cooperative, good eye contact, full/appropriate affect           Data:     Lab Results   Component Value Date    A1C 15.8 (H) 04/30/2025    A1C 19.1 (H) 04/15/2025    A1C 18.9 (H) 04/03/2025    A1C 17.1 (H) 01/23/2025    A1C 11.1 (H) 03/02/2023    A1C 7.3 (H) 11/09/2020    A1C 6.7 (A) 11/21/2019    A1C 8.2 (H) 06/11/2019    A1C Canceled, Test credited 06/10/2019    A1C 9.6 (H) 04/18/2019       ROUTINE IP LABS (Last four results)  BMP  Recent Labs   Lab 05/18/25  0611 05/18/25  0150 05/17/25  2234 05/17/25  1826 05/17/25  0944 05/17/25  0701 05/16/25  1819 05/16/25  1708 05/16/25  0650 05/16/25  0617 05/15/25  1332 05/15/25  1054     --   --   --   --  133*  --   --   --  139  --  137   POTASSIUM 4.1  --   --   --   --  4.3  --  4.8  --  3.3*  --  4.0   CHLORIDE 101  --   --   --   --  98  --   --   --  101  --  94*   ELIZA 8.6*  --   --   --   --  8.6*  --   --   --  9.0  --  9.3   CO2 25  --   --   --   --  26  --   --   --  28  --  28   BUN 15.1  --   --   --   --  12.7  --   --   --  4.1*  --  3.6*   CR 0.42*  --   --   --   --  0.49*  --   --   --  0.43*  --  0.41*   * 155* 161* 295*   < > 306*   < >  --    < > 55*   < > 571*    < > = values in this interval not displayed.     CBC  Recent Labs   Lab 05/18/25  0611 05/17/25  0701 05/16/25  0617 05/15/25  1054   WBC 9.9 8.8 10.8 8.0   RBC 3.42* 3.61* 3.60* 3.89   HGB 10.7* 11.4* 11.6* 12.3   HCT 32.2* 34.0* 33.5* 36.5   MCV  94 94 93 94   MCH 31.3 31.6 32.2 31.6   [facility] 33.2 33.5 34.6 33.7   RDW 13.0 13.1 12.8 12.6   * 625* 636* 699*     INRNo lab results found in last 7 days.    Inpatient Diabetes Service will continue to follow, please don't hesitate to contact the team with any questions or concerns.     Samantha Pavon PA-C  Inpatient Diabetes Service  [phone]  Available by [employer]  Date of Service: 5/18/2025    Plan discussed with patient, bedside RN in person, and primary team in person.    To contact Inpatient Diabetes Service:     7 AM - 5 PM: Page the IDS DAVID following the patient that day (see filed or incomplete progress notes/consult notes under Endocrinology)    OR if uncertain of provider assignment: page job code [phone]    5 PM - 7 AM: First call after hours is to primary service.    For urgent after-hours questions, page job code for on call fellow: [phone]     I spent a total of 45 minutes on the date of the encounter doing prep/post-work, chart review, history and exam, documentation and further activities per the note including lab review, multidisciplinary communication, counseling the patient and/or coordinating care regarding acute hyper/hypoglycemic management, as well as discharge management and planning/communication.

## 2025-05-18 NOTE — PLAN OF CARE
"[id number]  Goal Outcome Evaluation:  Blood pressure 117/72, pulse 87, temperature 97.7  F (36.5  C), temperature source Oral, resp. rate 16, SpO2 96%, not currently breastfeeding.  Pt A&OX4 mood is neutral to good. Co pain 8-10/10 managed using PRN IV Dilaudid and PO oxycodone. TF running with increase flush per MD ordered. /72 at the early or shift. No dizziness noted. Pt is on sliding scale and carb coverage and pt have her dinner on BS. Pt not oob during this shift. Call light within her reach. Pt can express her needs properly.     Problem: Adult Inpatient Plan of Care  Goal: Plan of Care Review  Description: The Plan of Care Review/Shift note should be completed every shift.  The Outcome Evaluation is a brief statement about your assessment that the patient is improving, declining, or no change.  This information will be displayed automatically on your shiftnote.  Outcome: Progressing  Goal: Patient-Specific Goal (Individualized)  Description: You can add care plan individualizations to a care plan. Examples of Individualization might be:  \"Parent requests to be called daily at 9am for status\", \"I have a hard time hearing out of my right ear\", or \"Do not touch me to wake me up as it startlesme\".  Outcome: Progressing  Goal: Absence of Hospital-Acquired Illness or Injury  Outcome: Progressing  Goal: Optimal Comfort and Wellbeing  Outcome: Progressing  Goal: Readiness for Transition of Care  Outcome: Progressing     Problem: Diabetes  Goal: Optimal Coping  Outcome: Progressing  Goal: Optimal Functional Ability  Outcome: Progressing  Goal: Blood Glucose Level Within Target Range  Outcome: Progressing  Goal: Minimize Risk of Hypoglycemia  Outcome: Progressing                             "

## 2025-05-18 NOTE — PROGRESS NOTES
[facility]    Medicine Progress Note - Hospitalist Service       Date of Admission:  5/15/2025  Assessment & Plan     Chnatell Kidd is a 59 year old female admitted on 5/15/2025. She has PMH of insulin-dependent diabetes mellitus after pancreatectomy, chronic pancreatitis, migraine, hypothyroidism, and venessa-en-Y with G tube nutrition who presented due to abdominal and back pain, will be admitted to medicine team for management.    Today's changes:   5/18/2025    Bp stable.   Continues to report pain abdomen. NV earlier today, venting G tube.   Would like to keep narcotics pain regimen today as it is. Did not agree to tapering or stopping it today. Said can stop iv tomorrow.   Continues to have small to loose bm- will try mg citrate po x 1. Encouraged to use suppository and enema prn.   Insulin management per endocrine  Appreciate GI consultation.  Follow-up GI workup- pending.   G tube drainage to gravity as needed for-increased abdominal pain, distention.  Continue current tube feeding  Regular diet as tolerated-  LFT improving-monitor    Dw patient. RN.        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 Admitted 4/16-2/24 as well as 4/30-5/6 with PEG-J repositioned/replaced by GI on 4/17 as well as 5/2. She presented again 5/15 due to acute worsening of abdominal pain, worse with TF though she has been able to continue them. Reports fever x1 earlier in the week and has since been using Tylenol without additional fever. Note she has been using Tylenol 1000mg q6 for the last month. Vitals on admit with mild tachycardia to 103, temp 37.2, BP WNL. Overall improved after IVF administration and pain control. CT AP w/o on admit with no acute abdominal findings, GJ in place. Labs concerning  for mildly elevated LFTs with , ALT 89, Alk phos 152. Tbili 0.3. WBC WNL. Patient feels unable to adequately manage abdominal pain at home at this time; previously required IP Pain team assistance with plan to establish OP in the coming months.   -- GI consulted, appreciate recommendations              > No urgent need for procedures at this time,              > Hepatic US:suggestive   of steatosis and chronic parenchymal liver disease. central pneumobilia, stable compared to   CT 5/15/2025.       -- Trend CMP daily  -- Pain plan:               > Pain team consulted, appreciate recommendations.       Flexeril 5-10mg PO Q 8 hours PRN.  Oxycodone 5-10mg PO Q 4 hours PRN                    To taper,   oxycodone 5-10mg PO Q 4 hours PRN x 1 day then decrease to Q 6 hours PRN x 1day then to Q 8 hours PRN x 1 day then to Q 12 hours PRN x 1day then to 1/day PRN x 1 day and STOP.      Minimize IV Dilaudid 0.2-0.4mg IV Q 4 hours PRN x 24 hours.  If no new acute pathology causing pain, would discontinue.      Simethicone as needed  Lidocaine patches 1-3 patches Q 24 hours, 12 hours on and 12 hours off  Menthol patches, 1 patch TD Q 8 hours  Aggressive bowel regimen   Keep pain clinic appt. On 6/10/2025 with Dr. Kapadia.    -- Continue PTA Creon 96,000U TID   -- Continue PTA famotidine, protonix, Linzess, reglan and sucralfate  -- Zofran PRN for nausea   -- MiraLAX 3 times daily, senna Colace twice daily.  Dulcolax and tapwater enema as needed.    GI consultation 5/16: Recommendations reviewed.  Appreciate assistance.     - Would check viral hepatitis panel, ERLINDA, smooth muscle antibodies, IgG, AMA, ceruloplasmin, iron studies, PETh, celiac labs, A1AT (ordered).  - Further evaluation with US liver. Can consider fibroscan outpatient with PCP to assess for liver fibrosis.   - Continue J feeds as tolerated. Use J tube for feeding, G tube for venting.   - Consider Reglan 10 mg IV TID for nausea.   - Consider promethazine q6h  PRN as well.  - Continue PERT, PPI therapy.  - Minimize narcotic use.  - Continue bowel regimen.       C/f recent ANGELINA, improved  Patient had labs with PCP during hospital follow up appointment on 5/13 during which her Cr was 3.05, up from her baseline of ~0.4. She presented on 5/15 to the ED with abdominal pain that radiates to the back. Also complaining of increasing urinary frequency and mild dysuria. Cr on admit back to baseline of 0.41. UA negative for signs of infection, though with significant glucosuria which may be contributing to symptoms. Question outpatient lab accuracy.  -- Daily BMP  -- Avoid nephrotoxins as able  -- I/O     Post-pancreatectomy diabetes (Type 1)  Chronic Pancreatitis and Sphincter of Oddi dysfunction s/p TPAIT (1/2012)  Patient has poorly controlled diabetes with multiple presentations with glucose >700. Today, presented with BG of 571. Most recent HbA1c of 15.8 4/30/25. No anion gap, Ketones <0.18. In the ED, she was given 1L IVF and glucose improved to 354. Has previously been seen by inpatient endocrinology team with concern for patient not using insulin at home, though she reports taking her insulin as directed prior to admission. Most recent OP Endo visit was on 5/13. Home regimen includes Lantus 18U qAM and at bedtime, NPH 8U qAM, and Novolog sliding scale, and ICR 1U:12CHO with meals. Most readings at home are significantly elevated and this has been a target of outpatient management.   -- Endocrinology consulted, appreciate recommendations  -- Diabetes regimen per endocrine.  -- Continue PTA Creon 96,000U TID with meals         BLE Edema  Has been ongoing for several months, she notes being started on Lasix 20mg daily and this was increased to 40mg BID by provider in [place] in the fall. She had BLE US during most recent admission 4/30/25 that was negative for DVT and confirmed soft tissue edema bilaterally. Over the last couple weeks, left leg is slightly more swollen than the  right. No history of injury, fracture, or surgery on this leg before. No erythema or wounds.  Better   -- holding PTA Lasix 40mg BID  -- GAIL hose during the day     Malnutrition   Cachexia   GJ in place with continuous TF at 45ml/hour. Reports she has been able to continue at this rate despite abdominal discomfort.   -- Nutrition consulted              > Continue Nutren 1.5 @ 40 ml/hr, will hold at midnight for hepatic US     History of adrenal insufficiency  Followed by Dr. Maher. Previously suppressed AM cortisol and has been on empiric therapy for possible adrenal insufficiency, unclear if central vs transient. Had previously been unable to wean steroids due to hypoglycemia episodes. Most recently saw Dr Maher 4/3/25 and goal is to retest in the near future with low dose ACTH stimulation testing.  -- Continue PTA hydrocortisone 10mg in the AM and 15mg in the PM   -- monitor.      Hypothyroidism  -- Continue PTA levothyroxine      MDD, Anxiety  Insomnia  -- Continue PTA duloxetine, trazodone, topiramate     Diet: Regular Diet Adult  Adult Formula Drip Feeding: Continuous [place] Peptide 1.5; Jejunostomy; Goal Rate: 45 ml/hr. Please see Relizorb orders. Please abide by 8 hr hang time for open systems; mL/hr    DVT Prophylaxis: Pneumatic Compression Devices  [place] Catheter: Not present  Code Status: Full Code      Disposition Plan     Expected Discharge Date: 05/19/2025      Destination: home with family;home with help/services       Entered: Sean Benítez MD 05/18/2025, 1:37 PM         Sean Benítez MD  Hospitalist Service  [facility]    ______________________________________________________________________    Interval History     See above.  Ongoing pain and bloating abdomen, diffuse  Nausea,vomiting worse today.     No fever or chills.   Having liquidy stools, with small stools.   No cough or cp or sob.   Gen weakness    Data reviewed today: I reviewed all  medications, new labs and imaging results over the last 24 hours.    Physical Exam   Vital Signs: Temp: 97.8  F (36.6  C) Temp src: Oral BP: 104/75 Pulse: 93   Resp: 14 SpO2: 95 % O2 Device: None (Room air)    Weight: 105 lbs 6.08 oz    General Appearance: Awake, interactive, NAD  HEENT: AT/NC, Anicteric, Moist MM  Respiratory: Normal work of breathing. RA  Cardiovascular: S1 S2 Regular.  GI/Abd: Soft. Diffuse ttp . Diffuse distention-mod. PEG/J tube + no g.r  Extremities: Distally wwp.   Neuro: AO x 4, Grossly non focal.   Skin: No acute rash on exposed areas.   Psychiatry: Stable mood.     Data   Recent Labs   Lab 05/18/25  1009 05/18/25  0832 05/18/25  0611 05/17/25  0944 05/17/25  0701 05/16/25  1819 05/16/25  1708 05/16/25  0650 05/16/25  0617   WBC  --   --  9.9  --  8.8  --   --   --  10.8   HGB  --   --  10.7*  --  11.4*  --   --   --  11.6*   MCV  --   --  94  --  94  --   --   --  93   PLT  --   --  573*  --  625*  --   --   --  636*   NA  --   --  135  --  133*  --   --   --  139   POTASSIUM  --   --  4.1  --  4.3  --  4.8  --  3.3*   CHLORIDE  --   --  101  --  98  --   --   --  101   CO2  --   --  25  --  26  --   --   --  28   BUN  --   --  15.1  --  12.7  --   --   --  4.1*   CR  --   --  0.42*  --  0.49*  --   --   --  0.43*   ANIONGAP  --   --  9  --  9  --   --   --  10   ELIZA  --   --  8.6*  --  8.6*  --   --   --  9.0   * 264* 218*   < > 306*   < >  --    < > 55*   ALBUMIN  --   --  3.3*  --  3.3*  --   --   --  3.4*   PROTTOTAL  --   --  5.8*  --  6.0*  --   --   --  6.4   BILITOTAL  --   --  <0.2  --  0.2  --   --   --  0.2   ALKPHOS  --   --  119  --  132  --   --   --  126   ALT  --   --  53*  --  65*  --   --   --  55*   AST  --   --  49*  --  90*  --   --   --  45    < > = values in this interval not displayed.       Imaging results reviewed over the past 24 hrs:   No results found for this or any previous visit (from the past 24 hours).    Medications   Current Facility-Administered  Medications   Medication Dose Route Frequency Provider Last Rate Last Admin    dextrose 10% infusion   Intravenous Continuous PRN Samantha Pavon PA-C         Current Facility-Administered Medications   Medication Dose Route Frequency Provider Last Rate Last Admin    acetaminophen (TYLENOL) tablet 650 mg  650 mg Oral or Feeding Tube TID Braxton Banuelos MD   650 mg at 05/18/25 0840    Or    acetaminophen (TYLENOL) Suppository 650 mg  650 mg Rectal TID Braxton Banuelos MD        DULoxetine (CYMBALTA) DR capsule 90 mg  90 mg Oral Daily Shivani Simon PA-C   90 mg at 05/18/25 0839    famotidine (PEPCID) tablet 20 mg  20 mg Oral or Feeding Tube At Bedtime Braxton Banuelos MD   20 mg at 05/17/25 2243    [Held by provider] furosemide (LASIX) tablet 40 mg  40 mg Oral or Feeding Tube BID Braxton Banuelos MD   40 mg at 05/16/25 1654    gabapentin (NEURONTIN) capsule 400 mg  400 mg Oral TID Shivani Simon PA-C   400 mg at 05/18/25 0839    hydrocortisone (CORTEF) tablet 10 mg  10 mg Oral QAM Shivani Simon PA-C   10 mg at 05/18/25 0841    hydrocortisone (CORTEF) tablet 15 mg  15 mg Oral QPM Shivani Simon PA-C   15 mg at 05/17/25 2024    insulin aspart (NovoLOG) injection (RAPID ACTING)  1-7 Units Subcutaneous TID  Samantha Pavon PA-C   1 Units at 05/18/25 1122    insulin aspart (NovoLOG) injection (RAPID ACTING)  1-5 Units Subcutaneous 2 times per day Samantha Pavon PA-C        insulin aspart (NovoLOG) injection (RAPID ACTING)   Subcutaneous TID w/meals Samantha Pavon PA-C   18 Units at 05/17/25 1900    insulin glargine (LANTUS PEN) injection 12 Units  12 Units Subcutaneous Q24H Samantha Pavon PA-C   12 Units at 05/17/25 2032    insulin NPH injection 6 Units  6 Units Subcutaneous Q24H Samantha Pavon PA-C   6 Units at 05/18/25 0833    insulin NPH injection 8 Units  8 Units Subcutaneous Q24H Samantha Pavon PA-C        levothyroxine (SYNTHROID/LEVOTHROID) tablet 125 mcg  125 mcg Oral QAShivani Bueno PA-C   125 mcg at  05/18/25 0841    Lidocaine (LIDOCARE) 4 % Patch 1-2 patch  1-2 patch Transdermal Q24h Sean Benítez MD        linaclotide (LINZESS) capsule 145 mcg  145 mcg Oral QAM  Shivani Simon PA-C   145 mcg at 05/18/25 0856    lipase-protease-amylase (CREON 12) 88302-97076-08463 units per capsule 8 capsule  8 capsule Oral TID w/meals Shivani Simon PA-C   8 capsule at 05/18/25 1224    metoclopramide (REGLAN) tablet 10 mg  10 mg Oral TID Shivani Simon PA-C   10 mg at 05/18/25 0838    pantoprazole (PROTONIX) EC tablet 40 mg  40 mg Oral BID Shivani Simon PA-C   40 mg at 05/18/25 0841    polyethylene glycol (MIRALAX) Packet 17 g  17 g Oral or Feeding Tube TID Braxton Banuelos MD   17 g at 05/18/25 0838    potassium chloride minerva ER (KLOR-CON M20) CR tablet 20 mEq  20 mEq Oral Daily Shivani Simon PA-C   20 mEq at 05/18/25 0840    senna-docusate (SENOKOT-S/PERICOLACE) 8.6-50 MG per tablet 2 tablet  2 tablet Oral or Feeding Tube BID Braxtno Banuelos MD        Or    senna-docusate (SENOKOT-S/PERICOLACE) 8.6-50 MG per tablet 2 tablet  2 tablet Oral or Feeding Tube BID Braxton Banuelos MD   2 tablet at 05/18/25 0841    simethicone (MYLICON) chewable tablet 80 mg  80 mg Oral 4x Daily Sean Benítez MD   80 mg at 05/18/25 1225    sodium chloride (PF) 0.9% PF flush 3 mL  3 mL Intracatheter Q8H [facility] Shivani Simon PA-C   3 mL at 05/17/25 1332    sucralfate (CARAFATE) tablet 1 g  1 g Oral 4x Daily AC & HS Shivani Simon PA-C   1 g at 05/18/25 1223    thiamine (B-1) tablet 100 mg  100 mg Oral or Feeding Tube Daily Braxton Banuelos MD   100 mg at 05/18/25 0841    topiramate (TOPAMAX) tablet 100 mg  100 mg Oral or Feeding Tube BID Braxton Banuelos MD   100 mg at 05/18/25 0841    traZODone (DESYREL) tablet 200 mg  200 mg Oral or Feeding Tube QPM Braxton Banuelos MD   200 mg at 05/17/25 2024

## 2025-05-19 ENCOUNTER — DOCUMENTATION ONLY (OUTPATIENT)
Dept: INTERNAL MEDICINE | Facility: CLINIC | Age: 60
End: 2025-05-19

## 2025-05-19 ENCOUNTER — APPOINTMENT (OUTPATIENT)
Dept: GENERAL RADIOLOGY | Facility: CLINIC | Age: 60
DRG: 391 | End: 2025-05-19
Attending: INTERNAL MEDICINE
Payer: MEDICARE

## 2025-05-19 LAB
A1AT SERPL-MCNC: 123 MG/DL (ref 90–200)
ANA SER QL IF: NEGATIVE
CERULOPLASMIN SERPL-MCNC: 29 MG/DL (ref 20–60)
GLUCOSE BLDC GLUCOMTR-MCNC: 112 MG/DL (ref 70–99)
GLUCOSE BLDC GLUCOMTR-MCNC: 115 MG/DL (ref 70–99)
GLUCOSE BLDC GLUCOMTR-MCNC: 311 MG/DL (ref 70–99)
GLUCOSE BLDC GLUCOMTR-MCNC: 70 MG/DL (ref 70–99)
GLUCOSE BLDC GLUCOMTR-MCNC: 99 MG/DL (ref 70–99)
IGG SERPL-MCNC: 729 MG/DL (ref 610–1616)
LABORATORY REPORT: NORMAL
PETH INTERPRETATION: NORMAL
PLPETH BLD-MCNC: <10 NG/ML
POPETH BLD-MCNC: <10 NG/ML

## 2025-05-19 PROCEDURE — 74018 RADEX ABDOMEN 1 VIEW: CPT | Mod: 26 | Performed by: RADIOLOGY

## 2025-05-19 PROCEDURE — 250N000011 HC RX IP 250 OP 636

## 2025-05-19 PROCEDURE — S9342 HIT ENTERAL PUMP DIEM: HCPCS

## 2025-05-19 PROCEDURE — 250N000013 HC RX MED GY IP 250 OP 250 PS 637

## 2025-05-19 PROCEDURE — 74018 RADEX ABDOMEN 1 VIEW: CPT

## 2025-05-19 PROCEDURE — 99232 SBSQ HOSP IP/OBS MODERATE 35: CPT | Performed by: PHYSICIAN ASSISTANT

## 2025-05-19 PROCEDURE — 120N000002 HC R&B MED SURG/OB UMMC

## 2025-05-19 PROCEDURE — 250N000013 HC RX MED GY IP 250 OP 250 PS 637: Performed by: STUDENT IN AN ORGANIZED HEALTH CARE EDUCATION/TRAINING PROGRAM

## 2025-05-19 PROCEDURE — 250N000013 HC RX MED GY IP 250 OP 250 PS 637: Performed by: INTERNAL MEDICINE

## 2025-05-19 PROCEDURE — 99232 SBSQ HOSP IP/OBS MODERATE 35: CPT | Performed by: INTERNAL MEDICINE

## 2025-05-19 PROCEDURE — 250N000011 HC RX IP 250 OP 636: Mod: JZ | Performed by: INTERNAL MEDICINE

## 2025-05-19 PROCEDURE — 258N000003 HC RX IP 258 OP 636: Performed by: INTERNAL MEDICINE

## 2025-05-19 RX ORDER — DEXTROSE MONOHYDRATE AND SODIUM CHLORIDE 5; .9 G/100ML; G/100ML
INJECTION, SOLUTION INTRAVENOUS CONTINUOUS
Status: DISCONTINUED | OUTPATIENT
Start: 2025-05-19 | End: 2025-05-20

## 2025-05-19 RX ADMIN — HYDROMORPHONE HYDROCHLORIDE 0.4 MG: 0.2 INJECTION, SOLUTION INTRAMUSCULAR; INTRAVENOUS; SUBCUTANEOUS at 03:25

## 2025-05-19 RX ADMIN — SUCRALFATE 1 G: 1 TABLET ORAL at 21:04

## 2025-05-19 RX ADMIN — FAMOTIDINE 20 MG: 20 TABLET, FILM COATED ORAL at 21:04

## 2025-05-19 RX ADMIN — PANCRELIPASE 8 CAPSULE: 60000; 12000; 38000 CAPSULE, DELAYED RELEASE PELLETS ORAL at 07:58

## 2025-05-19 RX ADMIN — OXYCODONE 10 MG: 5 TABLET ORAL at 21:04

## 2025-05-19 RX ADMIN — SIMETHICONE 80 MG: 80 TABLET, CHEWABLE ORAL at 20:16

## 2025-05-19 RX ADMIN — GABAPENTIN 400 MG: 400 CAPSULE ORAL at 07:57

## 2025-05-19 RX ADMIN — PANTOPRAZOLE SODIUM 40 MG: 40 TABLET, DELAYED RELEASE ORAL at 20:17

## 2025-05-19 RX ADMIN — SIMETHICONE 80 MG: 80 TABLET, CHEWABLE ORAL at 14:00

## 2025-05-19 RX ADMIN — TOPIRAMATE 100 MG: 50 TABLET, FILM COATED ORAL at 07:57

## 2025-05-19 RX ADMIN — POLYETHYLENE GLYCOL 3350 17 G: 17 POWDER, FOR SOLUTION ORAL at 14:01

## 2025-05-19 RX ADMIN — SUCRALFATE 1 G: 1 TABLET ORAL at 16:12

## 2025-05-19 RX ADMIN — PANCRELIPASE 8 CAPSULE: 60000; 12000; 38000 CAPSULE, DELAYED RELEASE PELLETS ORAL at 14:03

## 2025-05-19 RX ADMIN — SUCRALFATE 1 G: 1 TABLET ORAL at 13:59

## 2025-05-19 RX ADMIN — POLYETHYLENE GLYCOL 3350 17 G: 17 POWDER, FOR SOLUTION ORAL at 07:58

## 2025-05-19 RX ADMIN — ONDANSETRON 4 MG: 2 INJECTION INTRAMUSCULAR; INTRAVENOUS at 08:08

## 2025-05-19 RX ADMIN — SENNOSIDES AND DOCUSATE SODIUM 2 TABLET: 50; 8.6 TABLET ORAL at 20:18

## 2025-05-19 RX ADMIN — TOPIRAMATE 100 MG: 50 TABLET, FILM COATED ORAL at 20:17

## 2025-05-19 RX ADMIN — SIMETHICONE 80 MG: 80 TABLET, CHEWABLE ORAL at 07:57

## 2025-05-19 RX ADMIN — PANTOPRAZOLE SODIUM 40 MG: 40 TABLET, DELAYED RELEASE ORAL at 07:57

## 2025-05-19 RX ADMIN — THIAMINE HCL TAB 100 MG 100 MG: 100 TAB at 07:57

## 2025-05-19 RX ADMIN — TRAZODONE HYDROCHLORIDE 200 MG: 100 TABLET ORAL at 20:18

## 2025-05-19 RX ADMIN — GABAPENTIN 400 MG: 400 CAPSULE ORAL at 14:00

## 2025-05-19 RX ADMIN — LINACLOTIDE 145 MCG: 145 CAPSULE, GELATIN COATED ORAL at 07:58

## 2025-05-19 RX ADMIN — ACETAMINOPHEN 650 MG: 325 TABLET, FILM COATED ORAL at 20:18

## 2025-05-19 RX ADMIN — PANCRELIPASE 6 CAPSULE: 60000; 12000; 38000 CAPSULE, DELAYED RELEASE PELLETS ORAL at 18:29

## 2025-05-19 RX ADMIN — METOCLOPRAMIDE 10 MG: 10 TABLET ORAL at 20:17

## 2025-05-19 RX ADMIN — PROMETHAZINE HYDROCHLORIDE 25 MG: 25 TABLET ORAL at 13:58

## 2025-05-19 RX ADMIN — OXYCODONE 10 MG: 5 TABLET ORAL at 13:58

## 2025-05-19 RX ADMIN — SIMETHICONE 80 MG: 80 TABLET, CHEWABLE ORAL at 16:12

## 2025-05-19 RX ADMIN — METOCLOPRAMIDE 10 MG: 10 TABLET ORAL at 14:00

## 2025-05-19 RX ADMIN — HYDROCORTISONE 15 MG: 10 TABLET ORAL at 20:17

## 2025-05-19 RX ADMIN — DEXTROSE AND SODIUM CHLORIDE: 5; .9 INJECTION, SOLUTION INTRAVENOUS at 16:12

## 2025-05-19 RX ADMIN — POTASSIUM CHLORIDE 20 MEQ: 1500 TABLET, EXTENDED RELEASE ORAL at 07:57

## 2025-05-19 RX ADMIN — DULOXETINE 90 MG: 60 CAPSULE, DELAYED RELEASE ORAL at 07:56

## 2025-05-19 RX ADMIN — LEVOTHYROXINE SODIUM 125 MCG: 125 TABLET ORAL at 07:57

## 2025-05-19 RX ADMIN — METOCLOPRAMIDE 10 MG: 10 TABLET ORAL at 07:57

## 2025-05-19 RX ADMIN — POLYETHYLENE GLYCOL 3350, SODIUM SULFATE ANHYDROUS, SODIUM BICARBONATE, SODIUM CHLORIDE, POTASSIUM CHLORIDE 2000 ML: 236; 22.74; 6.74; 5.86; 2.97 POWDER, FOR SOLUTION ORAL at 18:23

## 2025-05-19 RX ADMIN — SUCRALFATE 1 G: 1 TABLET ORAL at 07:58

## 2025-05-19 RX ADMIN — SENNOSIDES AND DOCUSATE SODIUM 2 TABLET: 50; 8.6 TABLET ORAL at 07:57

## 2025-05-19 RX ADMIN — HYDROCORTISONE 10 MG: 10 TABLET ORAL at 07:57

## 2025-05-19 RX ADMIN — GABAPENTIN 400 MG: 400 CAPSULE ORAL at 20:18

## 2025-05-19 RX ADMIN — OXYCODONE 10 MG: 5 TABLET ORAL at 07:53

## 2025-05-19 RX ADMIN — ACETAMINOPHEN 650 MG: 325 TABLET, FILM COATED ORAL at 07:57

## 2025-05-19 ASSESSMENT — ACTIVITIES OF DAILY LIVING (ADL)
ADLS_ACUITY_SCORE: 58
ADLS_ACUITY_SCORE: 61
ADLS_ACUITY_SCORE: 58
ADLS_ACUITY_SCORE: 61
ADLS_ACUITY_SCORE: 58
ADLS_ACUITY_SCORE: 61
ADLS_ACUITY_SCORE: 61
ADLS_ACUITY_SCORE: 58
ADLS_ACUITY_SCORE: 61
ADLS_ACUITY_SCORE: 58

## 2025-05-19 NOTE — TELEPHONE ENCOUNTER
Patient currently inpatient.    Libby Jay, RN, [facility][note 3]  Diabetes Education Department  [facility], [place]

## 2025-05-19 NOTE — PROGRESS NOTES
Inpatient Diabetes Management Service: Daily Progress Note       HPI: Chantell Kidd is a 59 year old female admitted on 5/15/2025. 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  Inpatient Diabetes Service consulted for hyperglycemia and then hypoglycemia.           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 Poor control  (A1c 15.8 %  4/30/2025, Hgb: 10.5)  Enteral Feed/Steroid induced  Hyperglycemia  Abdominal pain     Plan/Recommendations:    - Cross Lisa held lantus dose pm of 5/18: lantus 12 units every 24 hours at 21:00 held for EG=644.  BG on CGM=<50.  -Give Lantus 10 units at 09:30 am    -Decrease NPH 8 units every 24 hours at 8 am for the tube feeds, 45 ml/hour ( decrease from 10 units)  -Decrease NPH 6 units at 8 pm to cover KF TF running at 45 ml/hour      - Continue Novolog Meal Coverage: 1 units per 12 g CHO, TID AC and 1:15  PRN with snacks/supplements   - BG monitoring:  before meals, bedtime, and 2 am  - Continue correction scale (ISF 50) before meals, bedtime, and 2 am  -PRN D10W will be needed to prevent hypoglycemia in case of unexpected TF interruption: 50 ml/h will provide 75% of the carbs in       - Hypoglycemia protocol    - Carb counting protocol           Discussion:   BG looked better yesterday.Had low BG last night so NPH already given and Lantus was held. She reports that her CGM alarmed at <50 but finger stick=115. This morning BG>300 as no lantus given.Gave decrease dose of lantus this am.    She had been eating quite well and then yesterday she did not eat as much, BG trended down.  Does she have some Chantell's phenomena  or just TF overnight not covered well?  Discharge Planning: (tentative)  Medications: TBD    Test Claims: TBD none needed.   Education:  Needs to be assessed closer to discharge.    Outpatient Follow-up:  [facility]: Libby Jay RN- just saw on 5/13/2025 and 8/21/25 with Dr. Maher     Please notify Inpatient Diabetes Service if changes are planned to steroids, nutrition, TPN/TF and anticipated procedures requiring prolonged NPO status.          Interval History/Review of Systems :   The last 24 hours progress and nursing notes reviewed.  Having  abdominal pain, worse today.  She has some nausea but possibly she is constipated even though her stools are loose.  Abdominal xray..     Planned Procedures/Surgeries: none     Inpatient Glucose Control:                 Recent Labs   Lab 05/18/25  0611 05/18/25  0150 05/17/25  2234 05/17/25  1826 05/17/25  1809 05/17/25  1338   * 155* 161* 295* 299* 260*              Medications Impacting Glycemia:   Steroids: PTA hydrocortisone 10 mg am, 15 mg HS (adrenal insufficiency)    D5W containing solutions/medications: d10 at 25 ml/hour, titrate to keep over 110, stopped  Other medications impacting glucose: none          Nutrition:   Orders Placed This Encounter      Regular Diet Adult     Supplements:  none  TF: Was on KF peptide 1.5, provides 149g of carbs or 6.2 g of cho per hour TF running at 45 ml and continuous -was on TF prior to admission  TPN: none          Diabetes History: see full consult note for complete diabetes history   Diabetes Type and Duration: Pancreatogenic diabetes s/p total pancreatectomy and islet autotransplant with insulin dependence since 1/2012  GAD65 antibody, zinc transporter 8 antibody, islet antibody, insulin antibody not available on epic search.     Numerous C-peptides:  Component      Latest Ref Rng 8/28/2018  6:47 AM 9/3/2018  6:41 PM 9/6/2018  8:00 AM 9/7/2018  6:55 AM 4/18/2019  11:04 AM 4/3/2025  2:01 PM    C-Peptide      0.9 - 6.9 ng/mL 0.3 (L)  0.2 (L)  1.8  2.8  1.8  0.5 (L)    Patient Fasting?           Yes      PTA Medication Regimen:   She says she was taking what her sheet said that she given on last discharge:  She was discharged on:     Long-acting insulin: glargine (Lantus) - take 4 units once daily at 6pm. Take at same time each day.     2) Rapid-acting insulin: aspart (Novolog) carbohydrate coverage/mealtime insulin - take 1 unit per 14 grams of carbohydrates before meals and snacks.     3) Rapid-acting insulin: aspart (Novolog) correction - see chart below. Take for high blood glucoses three times daily before meals when eating (or every 4-6 hours when receiving tube feeding) and at bedtime.  You may add the correction dose to the carbohydrate coverage/mealtime insulin dose and give in one injection--ideally 10-15 minutes before a meal.  You may take the correction dose even if you skip a meal (as long as it has been 4 hours since previous correction dose).      Blood Glucose Insulin Before Meals When Eating or every 4-6 hours when receiving tube feeding:   Less than 175 0 units   175 -224  1 units   225 - 274 2 units   275 - 324 3 units   325 - 374 4 units    375 - 424  5 units   425 - 474 6 units   475 or more 7 units         4) Intermediate-acting insulin: Novolin N (NPH). Take to cover tube feeds (dosed for [place] at 45 ml/hr)   - Take Novolin N (NPH) 4 units at 9AM   - Take Novolin N (NPH) 3 units at 9PM      (If your rate of tube feed were to decrease, you can reference the list below)  If TF rate at 20 ml per hour, give 1 units  If TF rate at 30 ml per hour, give 3 units  If TF rate from 40-45 ml/hr, give 5 units  Missing doses?: denies  Historical Diabetes Medications: MDI, insulin pumps     When she was in [place] during the winter and was discharged on TF:   - Lantus 18 units AM, 12 units PM (Covering TF)   - Novolog ICR 1:16  - Novolog correction 1:35 (more intuitive dosing        Glucose  "monitoring device/frequency/trends: Dexcom was put on her on 5/13/2025 by diabetes ed       Her glucose meter(checking 4-6 times daily) running 400 to \"high\" for the past 3 days since she has been sick.       Outpatient Diabetes Provider: Dr Maher  Formal Diabetes Education/Educator: Libby Jay, diabetes ed saw her on 5/13/2025, sara Bean saw her on 4/21/2025        Physical Exam:   /73 (BP Location: Left arm)   Pulse 88   Temp 97.8  F (36.6  C) (Oral)   Resp 16   Wt 47.8 kg (105 lb 6.1 oz)   SpO2 97%   BMI 19.27 kg/m    General:   in no acute distress. Very thin.  HEENT:  NC/AT. Laying in bed  Lungs: Remains on room air with no work of breathing  ABD:  rounded, slt tender  and soft today.  Has FT (GJ) and G is drained to since last night  Skin:  warm and dry,  MSK:   moving all extremities  Lymp:   + LE edema  Mental Status:  Alert and oriented x3  Psych:   Cooperative, good eye contact, full/appropriate affect             Data:            Lab Results   Component Value Date     A1C 15.8 (H) 04/30/2025     A1C 19.1 (H) 04/15/2025     A1C 18.9 (H) 04/03/2025     A1C 17.1 (H) 01/23/2025     A1C 11.1 (H) 03/02/2023     A1C 7.3 (H) 11/09/2020     A1C 6.7 (A) 11/21/2019     A1C 8.2 (H) 06/11/2019     A1C Canceled, Test credited 06/10/2019     A1C 9.6 (H) 04/18/2019         ROUTINE IP LABS (Last four results)  BMP                 Recent Labs   Lab 05/18/25  0611 05/18/25  0150 05/17/25  2234 05/17/25  1826 05/17/25  0944 05/17/25  0701 05/16/25  1819 05/16/25  1708 05/16/25  0650 05/16/25  0617 05/15/25  1332 05/15/25  1054     --   --   --   --  133*  --   --   --  139  --  137   POTASSIUM 4.1  --   --   --   --  4.3  --  4.8  --  3.3*  --  4.0   CHLORIDE 101  --   --   --   --  98  --   --   --  101  --  94*   ELIZA 8.6*  --   --   --   --  8.6*  --   --   --  9.0  --  9.3   CO2 25  --   --   --   --  26  --   --   --  28  --  28   BUN 15.1  --   --   --   --  12.7  --   --   --  4.1*  --  " 3.6*   CR 0.42*  --   --   --   --  0.49*  --   --   --  0.43*  --  0.41*   * 155* 161* 295*   < > 306*   < >  --    < > 55*   < > 571*    < > = values in this interval not displayed.      CBC         Recent Labs   Lab 05/18/25  0611 05/17/25  0701 05/16/25  0617 05/15/25  1054   WBC 9.9 8.8 10.8 8.0   RBC 3.42* 3.61* 3.60* 3.89   HGB 10.7* 11.4* 11.6* 12.3   HCT 32.2* 34.0* 33.5* 36.5   MCV 94 94 93 94   MCH 31.3 31.6 32.2 31.6   MCHC 33.2 33.5 34.6 33.7   RDW 13.0 13.1 12.8 12.6   * 625* 636* 699*      INRNo lab results found in last 7 days.     Inpatient Diabetes Service will continue to follow, please don't hesitate to contact the team with any questions or concerns.      Samantha Pavon PA-C  Inpatient Diabetes Service  [phone]  Available by [employer]  Date of Service: 5/19/2025     Plan discussed with patient, bedside RN in person, and primary team via this note.     To contact Inpatient Diabetes Service:     7 AM - 5 PM: Page the [employer] DAVID following the patient that day (see filed or incomplete progress notes/consult notes under Endocrinology)    OR if uncertain of provider assignment: page job code [phone]    5 PM - 7 AM: First call after hours is to primary service.    For urgent after-hours questions, page job code for on call fellow: [phone]      I spent a total of 45 minutes on the date of the encounter doing prep/post-work, chart review, history and exam, documentation and further activities per the note including lab review, multidisciplinary communication, counseling the patient and/or coordinating care regarding acute hyper/hypoglycemic management, as well as discharge management and planning/communication.

## 2025-05-19 NOTE — PLAN OF CARE
Patient A/Ox4. VSS. Denies CP, SOB, dizziness/LH. LSCTA. +fl/BS. Voiding well in bathroom, has some urgency. CMS intact ex some baseline numbness and tingling. Dressing to J and G tubes CDI. Tolerating regular diet without NV, also has TF running. Activity level is good, pt walked to bathroom. IV SL. Pain rated as tolerable throughout shift, managed with IV dilaudid and oxycodone occasionally - has back pain. Possible discharge today home with services. Patient has demonstrated ability to call appropriately. Patient is resting with call light within reach. Will continue to monitor.

## 2025-05-19 NOTE — PROGRESS NOTES
Type of Form Received: [facility] [id number]    Form Received (Date) 5/16/25   Form Filled out Yes, faxed 5/21/25   Placed in provider folder Yes

## 2025-05-19 NOTE — PLAN OF CARE
Goal Outcome Evaluation:      Plan of Care Reviewed With: patient    Overall Patient Progress: improvingOverall Patient Progress: improving    Outcome Evaluation: Pt AOx4, continues reporting pain to abdomen, new orders to discontinue IV dilaudid, X ray ordered today. Seen by endocrinology - elevated blood sugar this morning (lantus held overnight) will continue to monitor glucose and pain management POC.    Output: Voids spontaneously, denies difficulties    Last BM: Small amount on 5/18 per pt but reports difficulty, scheduled senna/miralax given   New orders for clear liquid diet & one x dose of Golytely this evening    Activity: Ax1, w/ walker and GB   Skin: Brusing to BUE  Blancheable redness to buttocks    Pain: Abd pain managed w/ prn oxy    CMS: CMS intact, baseline N&T    Dressing: J/G drain dressing changed     Diet: Clear liquid diet, intermittent nausea, prn zofran given w/ reported relief of symptoms  Continuous tube feedings held for now per MD orders    LDA: L PIV infusing 5D0.9NS    Plan: TBD, possible discharge home w/ home infusion  Will continue to monitor BG, pain and bowel management    Additional Info:

## 2025-05-19 NOTE — PROGRESS NOTES
Care Management Follow Up    Length of Stay (days): 4    Expected Discharge Date: 05/21/2025     Concerns to be Addressed: discharge planning     Patient plan of care discussed at interdisciplinary rounds: Yes    Anticipated Discharge Disposition: Home, Home Care, Home Infusion    [facility] [place] [place]   Phone  [phone]  Fax  [phone]    [facility]  Phone # [phone]  Fax # [phone]     Anticipated Discharge Services: None  Anticipated Discharge DME: None    Patient/family educated on Medicare website which has current facility and service quality ratings: no  Education Provided on the Discharge Plan: No  Patient/Family in Agreement with the Plan: yes    Referrals Placed by CM/SW:    Private pay costs discussed: Not applicable    Discussed  Partnership in Safe Discharge Planning  document with patient/family: No     Handoff Completed: No, handoff not indicated or clinically appropriate    Additional Information:  Patient is currently on service with [facility] for Reliz[facility] and enteral supplies. Patient is also on service with [facility] for RN/PT/OT services. Will resume care with [facility] and [facility] prior to discharge. [facility] will continue to follow for discharge needs.    Next Steps:   Medically ready    Elaine Santamaria RN, BSN  Care Coordinator, [facility]  Phone [phone]

## 2025-05-19 NOTE — PLAN OF CARE
Goal Outcome Evaluation:      Plan of Care Reviewed With: patient    Overall Patient Progress: improvingOverall Patient Progress: improving      VS: BP 96/67 (BP Location: Left arm)   Pulse 94   Temp 98.2  F (36.8  C) (Oral)   Resp 16   Wt 47.8 kg (105 lb 6.1 oz)   SpO2 97%   BMI 19.27 kg/m       O2: Sats >90% on RA.    Output: Voiding. Pt has urgency and intermittent incontinence episode.    Last BM: 5/18. Pt stated that she had small BM 5/18. Pt has order for Mag citrate to help with backed up stool but pt refused    Activity: SBA with walker.    Skin: Blanchable redness to buttock and bruises on BUE;s   Pain: Abdominal pain managed with prn oxycodone and with IV dilaudid for breakthrough pain.   Neuro: A&O X4. Baseline N/T in all extremities.   Dressing: None.   Diet: On regular diet with continuous TF running, BG checks with multiple insulin coverage.  2200 Lantus canceled due to low BG on patient monitoring. Unit BG glucometer recorded 101. Gave pt apple juice and patrick crackers.     LDA: PIV SL into left forearm.   Equipment: Patient belongings.    Plan: TBD. Continue POC.   Additional Info:

## 2025-05-19 NOTE — PROGRESS NOTES
[facility]    Medicine Progress Note - Hospitalist Service       Date of Admission:  5/15/2025  Assessment & Plan     Chantell Kidd is a 59 year old female admitted on 5/15/2025. She has PMH of insulin-dependent diabetes mellitus after pancreatectomy, chronic pancreatitis, migraine, hypothyroidism, and venessa-en-Y with G tube nutrition who presented due to abdominal and back pain, will be admitted to medicine team for management.    Today's changes:   5/19/2025  Continues to have nausea, diffuse abdominal pain.  Liquidy bowel movement .  Abdominal x-ray: Still with large colonic stool burden.  Tolerating p.o.  Vital stable.  Tolerating tube feeding.    -Try Golytely 2L po or feeding tube. Clear liquids, hold TF. D5nss 125/hr. Hold other bowel regimen. Give more golytely prn.   - GI following.  Appreciate input.  Transaminitis improving.  See GI note for details.  Signing off.  - Appreciate pain team consultation.  DC IV narcotics.  Continue to wean down oral narcotics.  - Diabetes management per endocrine.  Appreciate assistance.      Dw patient. RN.        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 Admitted 4/16-2/24 as well as 4/30-5/6 with PEG-J repositioned/replaced by GI on 4/17 as well as 5/2. She presented again 5/15 due to acute worsening of abdominal pain, worse with TF though she has been able to continue them. Reports fever x1 earlier in the week and has since been using Tylenol without additional fever. Note she has been using Tylenol 1000mg q6 for the last month. Vitals on admit with mild tachycardia to 103, temp 37.2, BP WNL. Overall improved after IVF administration and pain control. CT AP w/o on admit with no acute abdominal findings, GJ in place. Labs concerning for  mildly elevated LFTs with , ALT 89, Alk phos 152. Tbili 0.3. WBC WNL. Patient feels unable to adequately manage abdominal pain at home at this time; previously required IP Pain team assistance with plan to establish OP in the coming months.   -- GI consulted, appreciate recommendations              > No urgent need for procedures at this time,              > Hepatic US:suggestive of steatosis and chronic parenchymal liver disease. central pneumobilia, stable compared to CT 5/15/2025.       -- Trend CMP daily: Improving  -- Pain plan:               > Pain team consulted, appreciate recommendations.       Flexeril 5-10mg PO Q 8 hours PRN.  Oxycodone 5-10mg PO Q 4 hours PRN                    To taper,   oxycodone 5-10mg PO Q 4 hours PRN x 1 day then decrease to Q 6 hours PRN x 1day then to Q 8 hours PRN x 1 day then to Q 12 hours PRN x 1day then to 1/day PRN x 1 day and STOP.      Minimize IV Dilaudid 0.2-0.4mg IV Q 4 hours PRN x 24 hours.  If no new acute pathology causing pain, would discontinue.      Simethicone as needed  Lidocaine patches 1-3 patches Q 24 hours, 12 hours on and 12 hours off  Menthol patches, 1 patch TD Q 8 hours  Aggressive bowel regimen   Keep pain clinic appt. On 6/10/2025 with Dr. Kapadia.    -- Continue PTA Creon 96,000U TID   -- Continue PTA famotidine, protonix, Linzess, reglan and sucralfate  -- Zofran PRN for nausea   -- MiraLAX 3 times daily, senna Colace twice daily.  Dulcolax and tapwater enema as needed.    GI consultation 5/16: Recommendations reviewed.  Appreciate assistance.     - Would check viral hepatitis panel, ERLINDA, smooth muscle antibodies, IgG, AMA, ceruloplasmin, iron studies, PETh, celiac labs, A1AT (ordered).  - Further evaluation with US liver. Can consider fibroscan outpatient with PCP to assess for liver fibrosis.   - Continue J feeds as tolerated. Use J tube for feeding, G tube for venting.   - Consider Reglan 10 mg IV TID for nausea.   - Consider promethazine  q6h PRN as well.  - Continue PERT, PPI therapy.  - Minimize narcotic use.  - Continue bowel regimen.     5/19/2025  GI follow-up:      Work-up so far with negative hepatitis panel, F-actin levels, A1AT, PETh, ceruloplasmin, IgG, unremarkable iron studies. Most likely overall related to MAFLD with some labs still pending. Transaminitis overall improving.     Can follow-up outpatient with PCP for possible fibroscan to assess for fibrosis. No further work-up required at this time. GI will sign off at this time.      Follow-up in GI clinic 06/18/2025       C/f recent ANGELINA, improved  Patient had labs with PCP during hospital follow up appointment on 5/13 during which her Cr was 3.05, up from her baseline of ~0.4. She presented on 5/15 to the ED with abdominal pain that radiates to the back. Also complaining of increasing urinary frequency and mild dysuria. Cr on admit back to baseline of 0.41. UA negative for signs of infection, though with significant glucosuria which may be contributing to symptoms. Question outpatient lab accuracy.  -- Daily BMP: Stable  -- Avoid nephrotoxins as able  -- I/O     Post-pancreatectomy diabetes (Type 1)  Chronic Pancreatitis and Sphincter of Oddi dysfunction s/p TPAIT (1/2012)  Patient has poorly controlled diabetes with multiple presentations with glucose >700. Today, presented with BG of 571. Most recent HbA1c of 15.8 4/30/25. No anion gap, Ketones <0.18. In the ED, she was given 1L IVF and glucose improved to 354. Has previously been seen by inpatient endocrinology team with concern for patient not using insulin at home, though she reports taking her insulin as directed prior to admission. Most recent OP Endo visit was on 5/13. Home regimen includes Lantus 18U qAM and at bedtime, NPH 8U qAM, and Novolog sliding scale, and ICR 1U:12CHO with meals. Most readings at home are significantly elevated and this has been a target of outpatient management.   -- Endocrinology consulted, appreciate  recommendations  -- Diabetes regimen per endocrine.  -- Continue PTA Creon 96,000U TID with meals         BLE Edema  Has been ongoing for several months, she notes being started on Lasix 20mg daily and this was increased to 40mg BID by provider in [place] in the fall. She had BLE US during most recent admission 4/30/25 that was negative for DVT and confirmed soft tissue edema bilaterally. Over the last couple weeks, left leg is slightly more swollen than the right. No history of injury, fracture, or surgery on this leg before. No erythema or wounds.  Better   -- holding PTA Lasix 40mg BID  -- GAIL hose during the day     Malnutrition   Cachexia   GJ in place with continuous TF at 45ml/hour. Reports she has been able to continue at this rate despite abdominal discomfort.   -- Nutrition consulted              > Continue Nutren 1.5 @ 40 ml/hr, will hold at midnight for hepatic US     History of adrenal insufficiency  Followed by Dr. Maher. Previously suppressed AM cortisol and has been on empiric therapy for possible adrenal insufficiency, unclear if central vs transient. Had previously been unable to wean steroids due to hypoglycemia episodes. Most recently saw Dr Maher 4/3/25 and goal is to retest in the near future with low dose ACTH stimulation testing.  -- Continue PTA hydrocortisone 10mg in the AM and 15mg in the PM   -- monitor.      Hypothyroidism  -- Continue PTA levothyroxine      MDD, Anxiety  Insomnia  -- Continue PTA duloxetine, trazodone, topiramate    Mild Hypokalemia: replaced     Diet: Adult Formula Drip Feeding: Continuous [place] Peptide 1.5; Jejunostomy; Goal Rate: 45 ml/hr. Please see Relizorb orders. Please abide by 8 hr hang time for open systems; mL/hr  Clear Liquid Diet    DVT Prophylaxis: Pneumatic Compression Devices  [place] Catheter: Not present  Code Status: Full Code      Disposition Plan      Expected Discharge Date: 05/21/2025      Destination: home with family;home with help/services        Entered: Sean Benítez MD 05/19/2025, 3:46 PM         Sean Benítez MD  Hospitalist Service  [facility]    ______________________________________________________________________    Interval History     See above.  Ongoing pain and bloating abdomen, diffuse  Nausea present.  No vomiting today.  No fever or chills.   Having liquidy stools, no solid stools.  No cough or cp or sob.   Gen weakness    Data reviewed today: I reviewed all medications, new labs and imaging results over the last 24 hours.    Physical Exam   Vital Signs: Temp: 98.5  F (36.9  C) Temp src: Oral BP: 125/74 Pulse: 104   Resp: 16 SpO2: 97 % O2 Device: None (Room air)    Weight: 105 lbs 6.08 oz    General Appearance: Awake, interactive, NAD  HEENT: AT/NC, Anicteric, Moist MM  Respiratory: Normal work of breathing. RA  Cardiovascular: S1 S2 Regular.  GI/Abd: Soft. Diffuse ttp . Diffuse distention-mod. PEG/J tube + no g.r  Extremities: Distally wwp.   Neuro: AO x 4, Grossly non focal.   Skin: No acute rash on exposed areas.   Psychiatry: Stable mood.     Data   Recent Labs   Lab 05/19/25  1326 05/19/25  0913 05/19/25  0212 05/18/25  0832 05/18/25  0611 05/17/25  0944 05/17/25  0701 05/16/25  1819 05/16/25  1708 05/16/25  0650 05/16/25  0617   WBC  --   --   --   --  9.9  --  8.8  --   --   --  10.8   HGB  --   --   --   --  10.7*  --  11.4*  --   --   --  11.6*   MCV  --   --   --   --  94  --  94  --   --   --  93   PLT  --   --   --   --  573*  --  625*  --   --   --  636*   NA  --   --   --   --  135  --  133*  --   --   --  139   POTASSIUM  --   --   --   --  4.1  --  4.3  --  4.8  --  3.3*   CHLORIDE  --   --   --   --  101  --  98  --   --   --  101   CO2  --   --   --   --  25  --  26  --   --   --  28   BUN  --   --   --   --  15.1  --  12.7  --   --   --  4.1*   CR  --   --   --   --  0.42*  --  0.49*  --   --   --  0.43*   ANIONGAP  --   --   --   --  9  --  9  --   --   --  10   ELIZA  --    --   --   --  8.6*  --  8.6*  --   --   --  9.0   GLC 99 311* 115*   < > 218*   < > 306*   < >  --    < > 55*   ALBUMIN  --   --   --   --  3.3*  --  3.3*  --   --   --  3.4*   PROTTOTAL  --   --   --   --  5.8*  --  6.0*  --   --   --  6.4   BILITOTAL  --   --   --   --  <0.2  --  0.2  --   --   --  0.2   ALKPHOS  --   --   --   --  119  --  132  --   --   --  126   ALT  --   --   --   --  53*  --  65*  --   --   --  55*   AST  --   --   --   --  49*  --  90*  --   --   --  45    < > = values in this interval not displayed.       Imaging results reviewed over the past 24 hrs:   Recent Results (from the past 24 hours)   XR Abdomen 1 View    Narrative    EXAMINATION:  XR ABDOMEN 1 VIEW 5/19/2025     COMPARISON: CT abdomen and pelvis 5/15/2025    HISTORY: fu severe constipation. Diffuse abdominal pain..    TECHNIQUE: 2 frontal supine images of the abdomen.    FINDINGS: Percutaneous gastrostomy and left jejunostomy tubes project  over the left hemiabdomen. Cholecystectomy clips. Pneumobilia. Similar  2 slightly increased large colonic stool burden. No air-filled,  dilated loops of small bowel. Anastomotic suture lines in the right  abdomen and central surgical clips. The partially imaged lung bases  are unremarkable.       Impression    IMPRESSION:   1. Large colonic stool burden, similar to slightly increased compared  to 5/2/2025 radiograph and 5/15/2025 CT.  2. Persistent small amount of pneumobilia.    I have personally reviewed the examination and initial interpretation  and I agree with the findings.    JANETTE MIGUEL MD         SYSTEM ID:  [id number]       Medications   Current Facility-Administered Medications   Medication Dose Route Frequency Provider Last Rate Last Admin    dextrose 10% infusion   Intravenous Continuous PRN Samantha Pavon PA-C        dextrose 5% and 0.9% NaCl infusion   Intravenous Continuous Sean Benítez MD         Current Facility-Administered Medications   Medication Dose Route  Frequency Provider Last Rate Last Admin    acetaminophen (TYLENOL) tablet 650 mg  650 mg Oral or Feeding Tube TID Braxton Banuelos MD   650 mg at 05/19/25 0757    Or    acetaminophen (TYLENOL) Suppository 650 mg  650 mg Rectal TID Braxton Banuelos MD        DULoxetine (CYMBALTA) DR capsule 90 mg  90 mg Oral Daily Shivani Simon PA-C   90 mg at 05/19/25 0756    famotidine (PEPCID) tablet 20 mg  20 mg Oral or Feeding Tube At Bedtime Braxton Banuelos MD   20 mg at 05/18/25 2254    [Held by provider] furosemide (LASIX) tablet 40 mg  40 mg Oral or Feeding Tube BID Braxton Banuelos MD   40 mg at 05/16/25 1654    gabapentin (NEURONTIN) capsule 400 mg  400 mg Oral TID Shivani Simon PA-C   400 mg at 05/19/25 1400    hydrocortisone (CORTEF) tablet 10 mg  10 mg Oral QAM Shivani Simon PA-C   10 mg at 05/19/25 0757    hydrocortisone (CORTEF) tablet 15 mg  15 mg Oral QPM Shivani Simon PA-C   15 mg at 05/18/25 1943    insulin aspart (NovoLOG) injection (RAPID ACTING)  1-7 Units Subcutaneous TID  Samantha Pavon PA-C   4 Units at 05/19/25 0957    insulin aspart (NovoLOG) injection (RAPID ACTING)  1-5 Units Subcutaneous 2 times per day Samantha Pavon PA-C        insulin aspart (NovoLOG) injection (RAPID ACTING)   Subcutaneous TID w/meals Samantha Pavon PA-C   12 Units at 05/19/25 1454    insulin glargine (LANTUS PEN) injection 10 Units  10 Units Subcutaneous Q24H Samantha Pavon PA-C   10 Units at 05/19/25 0940    [Held by provider] insulin NPH injection 6 Units  6 Units Subcutaneous Q24H Samantha Pavon PA-C        [Held by provider] insulin NPH injection 8 Units  8 Units Subcutaneous Q24H Samantha Pavon PA-C   8 Units at 05/19/25 0917    levothyroxine (SYNTHROID/LEVOTHROID) tablet 125 mcg  125 mcg Oral QAM AC Shivani Simon PA-C   125 mcg at 05/19/25 0757    Lidocaine (LIDOCARE) 4 % Patch 1-2 patch  1-2 patch Transdermal Q24h Sean Benítez MD        linaclotide (LINZESS) capsule 145 mcg  145 mcg Oral QAM  Shivani Simon,  JUNO   145 mcg at 05/19/25 0758    lipase-protease-amylase (CREON 12) [id number] units per capsule 8 capsule  8 capsule Oral TID w/meals Shivani Simon PA-C   8 capsule at 05/19/25 1403    metoclopramide (REGLAN) tablet 10 mg  10 mg Oral TID Shivani Simon PA-C   10 mg at 05/19/25 1400    pantoprazole (PROTONIX) EC tablet 40 mg  40 mg Oral BID Shivani Simon PA-C   40 mg at 05/19/25 0757    polyethylene glycol (GoLYTELY) suspension 2,000 mL  2,000 mL Oral or Feeding Tube Once Sean Benítez MD        polyethylene glycol (MIRALAX) Packet 17 g  17 g Oral or Feeding Tube TID Braxton Banuelos MD   17 g at 05/19/25 1401    potassium chloride minerva ER (KLOR-CON M20) CR tablet 20 mEq  20 mEq Oral Daily Shivani Simon PA-C   20 mEq at 05/19/25 0757    senna-docusate (SENOKOT-S/PERICOLACE) 8.6-50 MG per tablet 2 tablet  2 tablet Oral or Feeding Tube BID Braxton Banuelos MD        Or    senna-docusate (SENOKOT-S/PERICOLACE) 8.6-50 MG per tablet 2 tablet  2 tablet Oral or Feeding Tube BID Braxton Banuelos MD   2 tablet at 05/19/25 0757    simethicone (MYLICON) chewable tablet 80 mg  80 mg Oral 4x Daily Sean Benítez MD   80 mg at 05/19/25 1400    sodium chloride (PF) 0.9% PF flush 3 mL  3 mL Intracatheter Q8H [facility] Shivani Simon PA-C   3 mL at 05/19/25 1401    sucralfate (CARAFATE) tablet 1 g  1 g Oral 4x Daily AC & HS Shivani Simon PA-C   1 g at 05/19/25 1359    thiamine (B-1) tablet 100 mg  100 mg Oral or Feeding Tube Daily Braxton Banuelos MD   100 mg at 05/19/25 0757    topiramate (TOPAMAX) tablet 100 mg  100 mg Oral or Feeding Tube BID Braxton Banuelos MD   100 mg at 05/19/25 0757    traZODone (DESYREL) tablet 200 mg  200 mg Oral or Feeding Tube QPM Braxton Banuelos MD   200 mg at 05/18/25 5646

## 2025-05-19 NOTE — PROGRESS NOTES
BRIEF GI NOTE    59 year old female with a history of idiopathic chronic pancreatitis s/p TPIAT in 2012 with associated insulin-dependent diabetes mellitus, migraines, hypothyroidism, RYGB with G-tube and J-tube placement presents with abdominal and back pain. GI Hepatology service consulted for evaluation of transaminitis.     Work-up so far with negative hepatitis panel, F-actin levels, A1AT, PETh, ceruloplasmin, IgG, unremarkable iron studies. Most likely overall related to MAFLD with some labs still pending. Transaminitis overall improving.    Can follow-up outpatient with PCP for possible fibroscan to assess for fibrosis. No further work-up required at this time. GI will sign off at this time.     Follow-up in GI clinic 06/18/2025     Dudley Pacheco MD  GI fellow

## 2025-05-20 ENCOUNTER — MEDICAL CORRESPONDENCE (OUTPATIENT)
Dept: HEALTH INFORMATION MANAGEMENT | Facility: CLINIC | Age: 60
End: 2025-05-20

## 2025-05-20 LAB
ANION GAP SERPL CALCULATED.3IONS-SCNC: 11 MMOL/L (ref 7–15)
BUN SERPL-MCNC: 14.5 MG/DL (ref 8–23)
CALCIUM SERPL-MCNC: 8.1 MG/DL (ref 8.8–10.4)
CHLORIDE SERPL-SCNC: 104 MMOL/L (ref 98–107)
CREAT SERPL-MCNC: 0.42 MG/DL (ref 0.51–0.95)
EGFRCR SERPLBLD CKD-EPI 2021: >90 ML/MIN/1.73M2
GLUCOSE BLDC GLUCOMTR-MCNC: 100 MG/DL (ref 70–99)
GLUCOSE BLDC GLUCOMTR-MCNC: 113 MG/DL (ref 70–99)
GLUCOSE BLDC GLUCOMTR-MCNC: 160 MG/DL (ref 70–99)
GLUCOSE BLDC GLUCOMTR-MCNC: 169 MG/DL (ref 70–99)
GLUCOSE BLDC GLUCOMTR-MCNC: 248 MG/DL (ref 70–99)
GLUCOSE SERPL-MCNC: 274 MG/DL (ref 70–99)
HAV IGM SERPL QL IA: NONREACTIVE
HBV CORE IGM SERPL QL IA: NONREACTIVE
HBV SURFACE AG SERPL QL IA: NONREACTIVE
HCO3 SERPL-SCNC: 22 MMOL/L (ref 22–29)
HCV AB SERPL QL IA: NONREACTIVE
POTASSIUM SERPL-SCNC: 3.9 MMOL/L (ref 3.4–5.3)
SODIUM SERPL-SCNC: 137 MMOL/L (ref 135–145)
TTG IGA SER-ACNC: 1 U/ML
TTG IGG SER-ACNC: <0.6 U/ML

## 2025-05-20 PROCEDURE — 36415 COLL VENOUS BLD VENIPUNCTURE: CPT | Performed by: INTERNAL MEDICINE

## 2025-05-20 PROCEDURE — 120N000002 HC R&B MED SURG/OB UMMC

## 2025-05-20 PROCEDURE — 258N000003 HC RX IP 258 OP 636: Performed by: INTERNAL MEDICINE

## 2025-05-20 PROCEDURE — 250N000013 HC RX MED GY IP 250 OP 250 PS 637: Performed by: STUDENT IN AN ORGANIZED HEALTH CARE EDUCATION/TRAINING PROGRAM

## 2025-05-20 PROCEDURE — S9342 HIT ENTERAL PUMP DIEM: HCPCS

## 2025-05-20 PROCEDURE — 82435 ASSAY OF BLOOD CHLORIDE: CPT | Performed by: INTERNAL MEDICINE

## 2025-05-20 PROCEDURE — 250N000013 HC RX MED GY IP 250 OP 250 PS 637: Performed by: INTERNAL MEDICINE

## 2025-05-20 PROCEDURE — 99232 SBSQ HOSP IP/OBS MODERATE 35: CPT | Performed by: CLINICAL NURSE SPECIALIST

## 2025-05-20 PROCEDURE — 250N000013 HC RX MED GY IP 250 OP 250 PS 637

## 2025-05-20 PROCEDURE — 99233 SBSQ HOSP IP/OBS HIGH 50: CPT | Performed by: INTERNAL MEDICINE

## 2025-05-20 PROCEDURE — 250N000011 HC RX IP 250 OP 636

## 2025-05-20 RX ADMIN — ACETAMINOPHEN 650 MG: 325 TABLET, FILM COATED ORAL at 14:36

## 2025-05-20 RX ADMIN — CYCLOBENZAPRINE HYDROCHLORIDE 5 MG: 5 TABLET, FILM COATED ORAL at 09:04

## 2025-05-20 RX ADMIN — METOCLOPRAMIDE 10 MG: 10 TABLET ORAL at 09:06

## 2025-05-20 RX ADMIN — TOPIRAMATE 100 MG: 50 TABLET, FILM COATED ORAL at 20:05

## 2025-05-20 RX ADMIN — FAMOTIDINE 20 MG: 20 TABLET, FILM COATED ORAL at 22:02

## 2025-05-20 RX ADMIN — LEVOTHYROXINE SODIUM 125 MCG: 125 TABLET ORAL at 09:07

## 2025-05-20 RX ADMIN — GABAPENTIN 400 MG: 400 CAPSULE ORAL at 14:36

## 2025-05-20 RX ADMIN — HYDROCORTISONE 15 MG: 10 TABLET ORAL at 20:05

## 2025-05-20 RX ADMIN — GABAPENTIN 400 MG: 400 CAPSULE ORAL at 20:05

## 2025-05-20 RX ADMIN — PANTOPRAZOLE SODIUM 40 MG: 40 TABLET, DELAYED RELEASE ORAL at 09:06

## 2025-05-20 RX ADMIN — POLYETHYLENE GLYCOL 3350 17 G: 17 POWDER, FOR SOLUTION ORAL at 20:05

## 2025-05-20 RX ADMIN — DEXTROSE AND SODIUM CHLORIDE: 5; .9 INJECTION, SOLUTION INTRAVENOUS at 09:14

## 2025-05-20 RX ADMIN — SENNOSIDES AND DOCUSATE SODIUM 2 TABLET: 50; 8.6 TABLET ORAL at 09:06

## 2025-05-20 RX ADMIN — METOCLOPRAMIDE 10 MG: 10 TABLET ORAL at 20:05

## 2025-05-20 RX ADMIN — THIAMINE HCL TAB 100 MG 100 MG: 100 TAB at 09:07

## 2025-05-20 RX ADMIN — ACETAMINOPHEN 650 MG: 325 TABLET, FILM COATED ORAL at 09:07

## 2025-05-20 RX ADMIN — SIMETHICONE 80 MG: 80 TABLET, CHEWABLE ORAL at 20:05

## 2025-05-20 RX ADMIN — PANCRELIPASE 8 CAPSULE: 60000; 12000; 38000 CAPSULE, DELAYED RELEASE PELLETS ORAL at 12:12

## 2025-05-20 RX ADMIN — SIMETHICONE 80 MG: 80 TABLET, CHEWABLE ORAL at 15:31

## 2025-05-20 RX ADMIN — DEXTROSE AND SODIUM CHLORIDE: 5; .9 INJECTION, SOLUTION INTRAVENOUS at 01:32

## 2025-05-20 RX ADMIN — SUCRALFATE 1 G: 1 TABLET ORAL at 09:06

## 2025-05-20 RX ADMIN — GABAPENTIN 400 MG: 400 CAPSULE ORAL at 09:06

## 2025-05-20 RX ADMIN — SUCRALFATE 1 G: 1 TABLET ORAL at 22:02

## 2025-05-20 RX ADMIN — CYCLOBENZAPRINE HYDROCHLORIDE 5 MG: 5 TABLET, FILM COATED ORAL at 15:31

## 2025-05-20 RX ADMIN — POLYETHYLENE GLYCOL 3350 17 G: 17 POWDER, FOR SOLUTION ORAL at 09:07

## 2025-05-20 RX ADMIN — ACETAMINOPHEN 650 MG: 325 TABLET, FILM COATED ORAL at 20:05

## 2025-05-20 RX ADMIN — OXYCODONE 10 MG: 5 TABLET ORAL at 12:21

## 2025-05-20 RX ADMIN — LINACLOTIDE 145 MCG: 145 CAPSULE, GELATIN COATED ORAL at 09:09

## 2025-05-20 RX ADMIN — POLYETHYLENE GLYCOL 3350 17 G: 17 POWDER, FOR SOLUTION ORAL at 14:36

## 2025-05-20 RX ADMIN — SUCRALFATE 1 G: 1 TABLET ORAL at 15:32

## 2025-05-20 RX ADMIN — POTASSIUM CHLORIDE 20 MEQ: 1500 TABLET, EXTENDED RELEASE ORAL at 09:06

## 2025-05-20 RX ADMIN — TRAZODONE HYDROCHLORIDE 200 MG: 100 TABLET ORAL at 20:05

## 2025-05-20 RX ADMIN — PANTOPRAZOLE SODIUM 40 MG: 40 TABLET, DELAYED RELEASE ORAL at 20:05

## 2025-05-20 RX ADMIN — SIMETHICONE 80 MG: 80 TABLET, CHEWABLE ORAL at 09:06

## 2025-05-20 RX ADMIN — SUCRALFATE 1 G: 1 TABLET ORAL at 12:12

## 2025-05-20 RX ADMIN — HYDROCORTISONE 10 MG: 10 TABLET ORAL at 09:06

## 2025-05-20 RX ADMIN — OXYCODONE 10 MG: 5 TABLET ORAL at 05:34

## 2025-05-20 RX ADMIN — OXYCODONE 10 MG: 5 TABLET ORAL at 18:49

## 2025-05-20 RX ADMIN — PANCRELIPASE 8 CAPSULE: 60000; 12000; 38000 CAPSULE, DELAYED RELEASE PELLETS ORAL at 18:45

## 2025-05-20 RX ADMIN — TOPIRAMATE 100 MG: 50 TABLET, FILM COATED ORAL at 09:07

## 2025-05-20 RX ADMIN — DULOXETINE 90 MG: 60 CAPSULE, DELAYED RELEASE ORAL at 09:06

## 2025-05-20 RX ADMIN — METOCLOPRAMIDE 10 MG: 10 TABLET ORAL at 14:36

## 2025-05-20 RX ADMIN — ONDANSETRON 4 MG: 4 TABLET, ORALLY DISINTEGRATING ORAL at 05:34

## 2025-05-20 RX ADMIN — SENNOSIDES AND DOCUSATE SODIUM 2 TABLET: 50; 8.6 TABLET ORAL at 20:05

## 2025-05-20 RX ADMIN — SIMETHICONE 80 MG: 80 TABLET, CHEWABLE ORAL at 12:12

## 2025-05-20 ASSESSMENT — ACTIVITIES OF DAILY LIVING (ADL)
ADLS_ACUITY_SCORE: 61
ADLS_ACUITY_SCORE: 58
ADLS_ACUITY_SCORE: 61
ADLS_ACUITY_SCORE: 61
ADLS_ACUITY_SCORE: 58
ADLS_ACUITY_SCORE: 61
ADLS_ACUITY_SCORE: 58
ADLS_ACUITY_SCORE: 61

## 2025-05-20 NOTE — PLAN OF CARE
Goal Outcome Evaluation:      Plan of Care Reviewed With: patient    Overall Patient Progress: improvingOverall Patient Progress: improving    Outcome Evaluation: Pt A&O x4; large, watery stool at the beginning of shift requiring a full bed change; D5NA running at 125mL/hr; no correction needed overnight; TF held d/t procdure and drain bag in place- no output; pt slept for most of shift and denied pain

## 2025-05-20 NOTE — PROGRESS NOTES
Inpatient Diabetes Management Service: Daily Progress Note       HPI: Chantell Kidd is a 59 year old female admitted on 5/15/2025. 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  Inpatient Diabetes Service consulted for hyperglycemia and then hypoglycemia.           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 Poor control  (A1c 15.8 %  4/30/2025, Hgb: 10.5)  Enteral Feed/Steroid induced  Hyperglycemia  Abdominal pain     Plan/Recommendations:    - reduce d5ns 125 ml/hr to 50 until TF resumes (then stop) --> order now discontinued.  PRN D10  W available  in case of TF interruption   -unhold Lantus 10 units at 09:30 am    -unhold NPH 8 units every 24 hours  for the tube feeds, 45 ml/hour --> starting at 1130 today  -unhold NPH 6 units q pm to cover KF TF running at 45 ml/hour --> re-timed to 2200     - Continue Novolog Meal Coverage: 1 units per 12 g CHO, TID AC and 1:15  PRN with snacks/supplements   - BG monitoring:  before meals, bedtime, and 2 am  - Continue correction scale (ISF 50) before meals, bedtime, and 2 am  -PRN D10W will be needed to prevent hypoglycemia in case of unexpected TF interruption: 50 ml/h will provide 75% of the carbs in       - Hypoglycemia protocol    - Carb counting protocol           Discussion:   NPH held after 0800 dose yesterday, and D5ns started for TF interruption.  Stable in/near target until early this AM.  Note clear liq diet is active but pt denies any intake after 1700.  Oral and FT nutrition to resume        Discharge Planning: (tentative) ? Dexcom  Medications: TBD    Test Claims: TBD none needed.   Education:   Needs to be assessed closer to discharge.    Outpatient Follow-up:  recommend [facility] Endocrinology: Libby Jay RN- just saw on 5/13/2025 and 8/21/25 with Dr. Maher     Please notify Inpatient Diabetes Service if changes are planned to steroids, nutrition, TPN/TF and anticipated procedures requiring prolonged NPO status.          Interval History/Review of Systems :   The last 24 hours progress and nursing notes reviewed.     Golytely 2L -- was able to get through about half -->large, watery stool at the beginning of night shift and again this morning.  Feels ready for resumption of diet-- wants regular.  Okay with TF too.  Reviewed insulin dosing adjustments.    Planned Procedures/Surgeries: none     Inpatient Glucose Control:                 Recent Labs   Lab 05/18/25  0611 05/18/25  0150 05/17/25  2234 05/17/25  1826 05/17/25  1809 05/17/25  1338   * 155* 161* 295* 299* 260*              Medications Impacting Glycemia:   Steroids: PTA hydrocortisone 10 mg am, 15 mg HS (adrenal insufficiency)    D5W containing solutions/medications: d10 at 25 ml/hour, titrate to keep over 110, stopped  Other medications impacting glucose: none          Nutrition:   Orders Placed This Encounter      Regular Diet Adult     Supplements:  none  TF: Was on KF peptide 1.5, provides 149g of carbs or 6.2 g of cho per hour TF running at 45 ml and continuous -was on TF prior to admission  TPN: none          Diabetes History: see full consult note for complete diabetes history   Diabetes Type and Duration: Pancreatogenic diabetes s/p total pancreatectomy and islet autotransplant with insulin dependence since 1/2012  GAD65 antibody, zinc transporter 8 antibody, islet antibody, insulin antibody not available on epic search.     Numerous C-peptides:  Component      Latest Ref Rng 8/28/2018  6:47 AM 9/3/2018  6:41 PM 9/6/2018  8:00 AM 9/7/2018  6:55 AM 4/18/2019  11:04 AM 4/3/2025  2:01 PM   C-Peptide      0.9 - 6.9 ng/mL 0.3 (L)   0.2 (L)  1.8  2.8  1.8  0.5 (L)    Patient Fasting?           Yes      PTA Medication Regimen:   She says she was taking what her sheet said that she given on last discharge:  She was discharged on:     Long-acting insulin: glargine (Lantus) - take 4 units once daily at 6pm. Take at same time each day.     2) Rapid-acting insulin: aspart (Novolog) carbohydrate coverage/mealtime insulin - take 1 unit per 14 grams of carbohydrates before meals and snacks.     3) Rapid-acting insulin: aspart (Novolog) correction - see chart below. Take for high blood glucoses three times daily before meals when eating (or every 4-6 hours when receiving tube feeding) and at bedtime.  You may add the correction dose to the carbohydrate coverage/mealtime insulin dose and give in one injection--ideally 10-15 minutes before a meal.  You may take the correction dose even if you skip a meal (as long as it has been 4 hours since previous correction dose).      Blood Glucose Insulin Before Meals When Eating or every 4-6 hours when receiving tube feeding:   Less than 175 0 units   175 -224  1 units   225 - 274 2 units   275 - 324 3 units   325 - 374 4 units    375 - 424  5 units   425 - 474 6 units   475 or more 7 units         4) Intermediate-acting insulin: Novolin N (NPH). Take to cover tube feeds (dosed for Sagrario Marinelli at 45 ml/hr)   - Take Novolin N (NPH) 4 units at 9AM   - Take Novolin N (NPH) 3 units at 9PM      (If your rate of tube feed were to decrease, you can reference the list below)  If TF rate at 20 ml per hour, give 1 units  If TF rate at 30 ml per hour, give 3 units  If TF rate from 40-45 ml/hr, give 5 units  Missing doses?: denies  Historical Diabetes Medications: MDI, insulin pumps     When she was in [place] during the winter and was discharged on TF:   - Lantus 18 units AM, 12 units PM (Covering TF)   - Novolog ICR 1:16  - Novolog correction 1:35 (more intuitive dosing        Glucose monitoring device/frequency/trends: Dexcom  "was put on her on 5/13/2025 by diabetes ed       Her glucose meter(checking 4-6 times daily) running 400 to \"high\" for the past 3 days since she has been sick.       Outpatient Diabetes Provider: Dr Maher  Formal Diabetes Education/Educator: Libby Jay, diabetes ed saw her on 5/13/2025, sara GanVikas saw her on 4/21/2025        Physical Exam:       Gen: Alert, in NAD   HEENT: Oral MMM,  hearing intact to conversational volume  Resp: Unlabored  Neuro: oriented x3, communicating clearly  Psych: calm, hopeful mood  /68 (BP Location: Left arm, Patient Position: Semi-Amaya's, Cuff Size: Adult Small)   Pulse 80   Temp 97.6  F (36.4  C) (Oral)   Resp 16   Wt 47.8 kg (105 lb 6.1 oz)   SpO2 97%   BMI 19.27 kg/m                 Data:            Lab Results   Component Value Date     A1C 15.8 (H) 04/30/2025     A1C 19.1 (H) 04/15/2025     A1C 18.9 (H) 04/03/2025     A1C 17.1 (H) 01/23/2025     A1C 11.1 (H) 03/02/2023     A1C 7.3 (H) 11/09/2020     A1C 6.7 (A) 11/21/2019     A1C 8.2 (H) 06/11/2019     A1C Canceled, Test credited 06/10/2019     A1C 9.6 (H) 04/18/2019         ROUTINE IP LABS (Last four results)  BMP                 Recent Labs   Lab 05/18/25  0611 05/18/25  0150 05/17/25  2234 05/17/25  1826 05/17/25  0944 05/17/25  0701 05/16/25  1819 05/16/25  1708 05/16/25  0650 05/16/25  0617 05/15/25  1332 05/15/25  1054     --   --   --   --  133*  --   --   --  139  --  137   POTASSIUM 4.1  --   --   --   --  4.3  --  4.8  --  3.3*  --  4.0   CHLORIDE 101  --   --   --   --  98  --   --   --  101  --  94*   ELIZA 8.6*  --   --   --   --  8.6*  --   --   --  9.0  --  9.3   CO2 25  --   --   --   --  26  --   --   --  28  --  28   BUN 15.1  --   --   --   --  12.7  --   --   --  4.1*  --  3.6*   CR 0.42*  --   --   --   --  0.49*  --   --   --  0.43*  --  0.41*   * 155* 161* 295*   < > 306*   < >  --    < > 55*   < > 571*    < > = values in this interval not displayed.      CBC         Recent " Labs   Lab 05/18/25  0611 05/17/25  0701 05/16/25  0617 05/15/25  1054   WBC 9.9 8.8 10.8 8.0   RBC 3.42* 3.61* 3.60* 3.89   HGB 10.7* 11.4* 11.6* 12.3   HCT 32.2* 34.0* 33.5* 36.5   MCV 94 94 93 94   MCH 31.3 31.6 32.2 31.6   MCHC 33.2 33.5 34.6 33.7   RDW 13.0 13.1 12.8 12.6   * 625* 636* 699*      INRNo lab results found in last 7 days.     Inpatient Diabetes Service will continue to follow, please don't hesitate to contact the team with any questions or concerns.           Plan discussed with patient, bedside RN in person, and primary team via this note.     To contact Inpatient Diabetes Service:     7 AM - 5 PM: Page the IDS DAVID following the patient that day (see filed or incomplete progress notes/consult notes under Endocrinology)    OR if uncertain of provider assignment: page job code [phone]    5 PM - 7 AM: First call after hours is to primary service.    For urgent after-hours questions, page job code for on call fellow: [phone]      I spent a total of 42 minutes on the date of the encounter doing prep/post-work, chart review, history and exam, documentation and further activities per the note including lab review, multidisciplinary communication, counseling the patient and/or coordinating care regarding acute hyper/hypoglycemic management, as well as discharge management and planning/communication.

## 2025-05-20 NOTE — PROGRESS NOTES
[facility]    Medicine Progress Note - Hospitalist Service, GOLD TEAM 18    Date of Admission:  5/15/2025    Assessment & Plan     Chantell Kidd is a 59 year old female admitted on 5/15/2025. She has PMH of insulin-dependent diabetes mellitus after pancreatectomy, chronic pancreatitis, migraine, hypothyroidism, and venessa-en-Y with G tube nutrition who presented due to abdominal and back pain, will be admitted to medicine team for management.     5/20  - still describes pain 9/10 , pain medications, oxycodone  helps   - stools now yellow brownish , had golytely yesterday   - she wants  diet advanced   - off IV opioids , did discussed with  patient  the need to taper orals       Acute on chronic abdominal pain   Transaminitis  Hx Venessa-en-Y (2012) s/p  G-tube and J tube placement for nutrition  S/p cholecystectomy (2004)  Severe constipation   Idiopathic chronic pancreatitis status post  TPIAT in 2012      Patient has had multiple recent admissions due to abdominal pain and issues with PEG tube. Admitted 4/16-2/24 as well as 4/30-5/6 with PEG-J repositioned/replaced by GI on 4/17 as well as 5/2. She presented again 5/15 due to acute worsening of abdominal pain, worse with TF though she has been able to continue them. Reports fever x1 earlier in the week and has since been using Tylenol without additional fever. Note she has been using Tylenol 1000mg q6 for the last month. Vitals on admit with mild tachycardia to 103, temp 37.2, BP WNL. Overall improved after IVF administration and pain control. CT AP w/o on admit with no acute abdominal findings, GJ in place. Labs concerning for mildly elevated LFTs with , ALT 89, Alk phos 152. Tbili 0.3. WBC WNL. Patient feels unable to adequately manage abdominal pain at home at this time; previously required IP Pain team assistance with plan to establish OP in the coming months.   -- GI consulted, appreciate recommendations            "   > No urgent need for procedures at this time,              > Hepatic US:suggestive of steatosis and chronic parenchymal liver disease. central pneumobilia, stable compared to CT 5/15/2025.       -  Pain plan:               > Pain team consulted, appreciate recommendations.      Flexeril 5-10mg PO Q 8 hours PRN.  Oxycodone 5-10mg PO Q 4 hours PRN                    To taper,   oxycodone 5-10mg PO Q 4 hours PRN x 1 day then decrease to Q 6 hours PRN x 1day then to Q 8 hours PRN x 1 day then to Q 12 hours PRN x 1day then to 1/day PRN x 1 day and STOP.      Minimize IV Dilaudid 0.2-0.4mg IV Q 4 hours PRN x 24 hours.  If no new acute pathology causing pain, would discontinue.      Simethicone as needed  Lidocaine patches 1-3 patches Q 24 hours, 12 hours on and 12 hours off  Menthol patches, 1 patch TD Q 8 hours  Aggressive bowel regimen   Keep pain clinic appt. On 6/10/2025 with Dr. Kapadia.     -- Continue PTA Creon 96,000U TID   -- Continue PTA famotidine, protonix, Linzess, reglan and sucralfate  -- Zofran PRN for nausea   -- MiraLAX 3 times daily, senna Colace twice daily.  Dulcolax and tapwater enema as needed.     GI consultation 5/16: Recommendations reviewed.  Appreciate assistance.  - per GI \" s o far with negative hepatitis panel, F-actin levels, A1AT, PETh, ceruloplasmin, IgG, unremarkable iron studies. Most likely overall related to MAFLD with some labs still pending. Transaminitis overall improving. \"  - follow up  as out patient  for possible fiberoscan  to assess fibrosis  - A1At 123,   -Tissues transglutaminase IgA 1, IgG < 0.6   -IgG 729   - PETH ceruloplasmin  29 < 10   - ferritin 38   -   - ERLINDA negative   -F-actin ( smooth muscle ) 2   - viral hepatitis juts ordered so pending    - mitochondrial M2 An IgG  pending   - liver US \"1.  Coarse hepatic echotexture and increased echogenicity suggestive  of steatosis and chronic parenchymal liver disease.    2.  Findings compatible with central " "pneumobilia, stable compared to  CT 5/15/2025.  \"  -. Can consider fibroscan outpatient with PCP to assess for liver fibrosis.   - Continue J feeds as tolerated. Use J tube for feeding, G tube for venting.   - Consider Reglan 10 mg IV TID for nausea.   - Consider promethazine q6h PRN as well.  - Continue PERT, PPI therapy.  - Minimize narcotic use.  - Continue bowel regimen.      5/19/2025  GI follow-up:   Work-up so far with negative hepatitis panel, F-actin levels, A1AT, PETh, ceruloplasmin, IgG, unremarkable iron studies. Most likely overall related to MAFLD with some labs still pending. Transaminitis overall improving.   Can follow-up outpatient with PCP for possible fibroscan to assess for fibrosis. No further work-up required at this time. GI will sign off at this time.      Follow-up in GI clinic 06/18/2025         C/f recent ANGELINA, improved  Patient had labs with PCP during hospital follow up appointment on 5/13 during which her Cr was 3.05, up from her baseline of ~0.4. She presented on 5/15 to the ED with abdominal pain that radiates to the back. Also complaining of increasing urinary frequency and mild dysuria. Cr on admit back to baseline of 0.41. UA negative for signs of infection, though with significant glucosuria which may be contributing to symptoms. Question outpatient lab accuracy.  -- creatinine now remains ~ 0.4      Post-pancreatectomy diabetes (Type 1)  Chronic Pancreatitis and Sphincter of Oddi dysfunction s/p TPAIT (1/2012)  Patient has poorly controlled diabetes with multiple presentations with glucose >700. Today, presented with BG of 571. Most recent HbA1c of 15.8 4/30/25. No anion gap, Ketones <0.18. In the ED, she was given 1L IVF and glucose improved to 354. Has previously been seen by inpatient endocrinology team with concern for patient not using insulin at home, though she reports taking her insulin as directed prior to admission. Most recent OP Endo visit was on 5/13. Home regimen " includes Lantus 18U qAM and at bedtime, NPH 8U qAM, and Novolog sliding scale, and ICR 1U:12CHO with meals. Most readings at home are significantly elevated and this has been a target of outpatient management.   -- Endocrinology consulted, appreciate recommendations  -- Diabetes regimen per endocrine.  -- Continue PTA Creon 96,000U TID with meals          BLE Edema  Has been ongoing for several months, she notes being started on Lasix 20mg daily and this was increased to 40mg BID by provider in [place] in the fall. She had BLE US during most recent admission 4/30/25 that was negative for DVT and confirmed soft tissue edema bilaterally. Over the last couple weeks, left leg is slightly more swollen than the right. No history of injury, fracture, or surgery on this leg before. No erythema or wounds.  Better   -- holding PTA Lasix 40mg BID  -- GAIL hose during the day     Malnutrition   Cachexia   GJ in place with continuous TF at 45ml/hour. Reports she has been able to continue at this rate despite abdominal discomfort.   -- Nutrition consulted, restart tube feeding                   History of adrenal insufficiency  Followed by Dr. Maher. Previously suppressed AM cortisol and has been on empiric therapy for possible adrenal insufficiency, unclear if central vs transient. Had previously been unable to wean steroids due to hypoglycemia episodes. Most recently saw Dr Maher 4/3/25 and goal is to retest in the near future with low dose ACTH stimulation testing.  -- Continue PTA hydrocortisone 10mg in the AM and 15mg in the PM   -- monitor.      Hypothyroidism  -- Continue PTA levothyroxine      MDD, Anxiety  Insomnia  -- Continue PTA duloxetine, trazodone, topiramate     Mild Hypokalemia: replaced        Diet: Adult Formula Drip Feeding: Continuous NeoReach Peptide 1.5; Jejunostomy; Goal Rate: 45 ml/hr. Please see Relizorb orders. Please abide by 8 hr hang time for open systems; mL/hr  Clear Liquid Diet    DVT  Prophylaxis: Ambulate every shift  Mckee Catheter: Not present  Lines: None     Cardiac Monitoring: None  Code Status: Full Code      Clinically Significant Risk Factors               # Hypoalbuminemia: Lowest albumin = 3.3 g/dL at 5/18/2025  6:11 AM, will monitor as appropriate                    # Financial/Environmental Concerns: none         Social Drivers of Health    Food Insecurity: Low Risk  (5/15/2025)    Food Insecurity     Within the past 12 months, did you worry that your food would run out before you got money to buy more?: No     Within the past 12 months, did the food you bought just not last and you didn t have money to get more?: No   Recent Concern: Food Insecurity - High Risk (4/16/2025)    Food Insecurity     Within the past 12 months, did you worry that your food would run out before you got money to buy more?: Yes     Within the past 12 months, did the food you bought just not last and you didn t have money to get more?: Yes   Depression: At risk (5/13/2025)    PHQ-2     PHQ-2 Score: 5          Disposition Plan     Medically Ready for Discharge: Anticipated in 2-4 Days             Rosy Basilio MD  Hospitalist Service, GOLD TEAM 18  [facility]  Securely message with [employer] (more info)  Text page via [facility] Paging/Directory   See signed in provider for up to date coverage information  ______________________________________________________________________    Interval History   Still describes significant abdominal pain , some nausea no vomiting, had yellow stools     Physical Exam   Vital Signs: Temp: 97.6  F (36.4  C) Temp src: Oral BP: 105/68 Pulse: 80   Resp: 16 SpO2: 97 % O2 Device: None (Room air)    Weight: 105 lbs 6.08 oz  General appearence: awake alert  in  no apparent distress    NECK : supple  RESPIRATORY: lungs clear   CARDIOVASCULAR:S1 S2 regular rate and rhythm,   GASTROINTESTINAL:soft, G J tube site looks good , + bowel sounds     SKIN: warm and dry, no mottling noted   NEUROLOGIC; awake alert and oriented  EXTREMITIES: no clubbing, cyanosis or edema , moves all extremity,     Data     I have personally reviewed the following data over the past 24 hrs:    N/A  \   N/A   / N/A     137 104 14.5 /  274 (H)   3.9 22 0.42 (L) \       Imaging results reviewed over the past 24 hrs:   Recent Results (from the past 24 hours)   XR Abdomen 1 View    Narrative    EXAMINATION:  XR ABDOMEN 1 VIEW 5/19/2025     COMPARISON: CT abdomen and pelvis 5/15/2025    HISTORY: fu severe constipation. Diffuse abdominal pain..    TECHNIQUE: 2 frontal supine images of the abdomen.    FINDINGS: Percutaneous gastrostomy and left jejunostomy tubes project  over the left hemiabdomen. Cholecystectomy clips. Pneumobilia. Similar  2 slightly increased large colonic stool burden. No air-filled,  dilated loops of small bowel. Anastomotic suture lines in the right  abdomen and central surgical clips. The partially imaged lung bases  are unremarkable.       Impression    IMPRESSION:   1. Large colonic stool burden, similar to slightly increased compared  to 5/2/2025 radiograph and 5/15/2025 CT.  2. Persistent small amount of pneumobilia.    I have personally reviewed the examination and initial interpretation  and I agree with the findings.    JANETTE MIGUEL MD         SYSTEM ID:  [id number]

## 2025-05-20 NOTE — PROGRESS NOTES
Pt AOx4, continues reporting pain to abdomen/back. will continue to monitor glucose and pain management POC.   /74 (BP Location: Left arm)   Pulse 104   Temp 98.5  F (36.9  C) (Oral)   Resp 16   Wt 47.8 kg (105 lb 6.1 oz)   SpO2 97%   BMI 19.27 kg/m     Output: Voids spontaneously, denies difficulties    Last BM: Small amount on 5/18  New orders for clear liquid diet & one x dose of Golytely this evening, scheduled senna given as well.    Activity: SBA, w/ walker and GB   Skin: Brusing to BUE  Blancheable redness to buttocks    Pain: Abd/back pain managed w/ prn oxy    CMS: CMS intact, baseline N&T    Dressing: J/G drain dressing CDI   Diet: Clear liquid diet  Continuous tube feedings held for now per MD orders    LDA: L PIV infusing 5D0.9NS    Plan: TBD, possible discharge home w/ home infusion  Will continue to monitor BG, pain and bowel management    Additional Info:

## 2025-05-21 LAB
ALBUMIN SERPL BCG-MCNC: 3.4 G/DL (ref 3.5–5.2)
ALP SERPL-CCNC: 111 U/L (ref 40–150)
ALT SERPL W P-5'-P-CCNC: 37 U/L (ref 0–50)
ANION GAP SERPL CALCULATED.3IONS-SCNC: 6 MMOL/L (ref 7–15)
AST SERPL W P-5'-P-CCNC: 30 U/L (ref 0–45)
BILIRUB DIRECT SERPL-MCNC: <0.08 MG/DL (ref 0–0.3)
BILIRUB SERPL-MCNC: 0.2 MG/DL
BUN SERPL-MCNC: 14.6 MG/DL (ref 8–23)
CALCIUM SERPL-MCNC: 8.7 MG/DL (ref 8.8–10.4)
CHLORIDE SERPL-SCNC: 102 MMOL/L (ref 98–107)
CREAT SERPL-MCNC: 0.47 MG/DL (ref 0.51–0.95)
EGFRCR SERPLBLD CKD-EPI 2021: >90 ML/MIN/1.73M2
ERYTHROCYTE [DISTWIDTH] IN BLOOD BY AUTOMATED COUNT: 13.2 % (ref 10–15)
GLUCOSE BLDC GLUCOMTR-MCNC: 161 MG/DL (ref 70–99)
GLUCOSE BLDC GLUCOMTR-MCNC: 239 MG/DL (ref 70–99)
GLUCOSE BLDC GLUCOMTR-MCNC: 243 MG/DL (ref 70–99)
GLUCOSE BLDC GLUCOMTR-MCNC: 269 MG/DL (ref 70–99)
GLUCOSE BLDC GLUCOMTR-MCNC: 278 MG/DL (ref 70–99)
GLUCOSE BLDC GLUCOMTR-MCNC: 297 MG/DL (ref 70–99)
GLUCOSE BLDC GLUCOMTR-MCNC: 333 MG/DL (ref 70–99)
GLUCOSE SERPL-MCNC: 323 MG/DL (ref 70–99)
HCO3 SERPL-SCNC: 25 MMOL/L (ref 22–29)
HCT VFR BLD AUTO: 33.8 % (ref 35–47)
HGB BLD-MCNC: 11.4 G/DL (ref 11.7–15.7)
MCH RBC QN AUTO: 31.6 PG (ref 26.5–33)
MCHC RBC AUTO-ENTMCNC: 33.7 G/DL (ref 31.5–36.5)
MCV RBC AUTO: 94 FL (ref 78–100)
PLATELET # BLD AUTO: 618 10E3/UL (ref 150–450)
POTASSIUM SERPL-SCNC: 4 MMOL/L (ref 3.4–5.3)
PROT SERPL-MCNC: 6.3 G/DL (ref 6.4–8.3)
RBC # BLD AUTO: 3.61 10E6/UL (ref 3.8–5.2)
SODIUM SERPL-SCNC: 133 MMOL/L (ref 135–145)
WBC # BLD AUTO: 8.8 10E3/UL (ref 4–11)

## 2025-05-21 PROCEDURE — 99232 SBSQ HOSP IP/OBS MODERATE 35: CPT | Performed by: NURSE PRACTITIONER

## 2025-05-21 PROCEDURE — 99233 SBSQ HOSP IP/OBS HIGH 50: CPT | Performed by: INTERNAL MEDICINE

## 2025-05-21 PROCEDURE — 120N000002 HC R&B MED SURG/OB UMMC

## 2025-05-21 PROCEDURE — 82247 BILIRUBIN TOTAL: CPT | Performed by: INTERNAL MEDICINE

## 2025-05-21 PROCEDURE — S9342 HIT ENTERAL PUMP DIEM: HCPCS

## 2025-05-21 PROCEDURE — 250N000013 HC RX MED GY IP 250 OP 250 PS 637

## 2025-05-21 PROCEDURE — 250N000011 HC RX IP 250 OP 636

## 2025-05-21 PROCEDURE — 85027 COMPLETE CBC AUTOMATED: CPT | Performed by: INTERNAL MEDICINE

## 2025-05-21 PROCEDURE — 82248 BILIRUBIN DIRECT: CPT | Performed by: INTERNAL MEDICINE

## 2025-05-21 PROCEDURE — 250N000013 HC RX MED GY IP 250 OP 250 PS 637: Performed by: STUDENT IN AN ORGANIZED HEALTH CARE EDUCATION/TRAINING PROGRAM

## 2025-05-21 PROCEDURE — 36415 COLL VENOUS BLD VENIPUNCTURE: CPT | Performed by: INTERNAL MEDICINE

## 2025-05-21 PROCEDURE — 250N000013 HC RX MED GY IP 250 OP 250 PS 637: Performed by: INTERNAL MEDICINE

## 2025-05-21 RX ADMIN — CYCLOBENZAPRINE HYDROCHLORIDE 5 MG: 5 TABLET, FILM COATED ORAL at 22:15

## 2025-05-21 RX ADMIN — HYDROCORTISONE 10 MG: 10 TABLET ORAL at 09:54

## 2025-05-21 RX ADMIN — SIMETHICONE 80 MG: 80 TABLET, CHEWABLE ORAL at 09:51

## 2025-05-21 RX ADMIN — POLYETHYLENE GLYCOL 3350 17 G: 17 POWDER, FOR SOLUTION ORAL at 19:55

## 2025-05-21 RX ADMIN — POLYETHYLENE GLYCOL 3350 17 G: 17 POWDER, FOR SOLUTION ORAL at 09:53

## 2025-05-21 RX ADMIN — TOPIRAMATE 100 MG: 50 TABLET, FILM COATED ORAL at 09:52

## 2025-05-21 RX ADMIN — OXYCODONE 10 MG: 5 TABLET ORAL at 17:40

## 2025-05-21 RX ADMIN — CYCLOBENZAPRINE HYDROCHLORIDE 5 MG: 5 TABLET, FILM COATED ORAL at 17:40

## 2025-05-21 RX ADMIN — SENNOSIDES AND DOCUSATE SODIUM 2 TABLET: 50; 8.6 TABLET ORAL at 09:49

## 2025-05-21 RX ADMIN — LEVOTHYROXINE SODIUM 125 MCG: 125 TABLET ORAL at 09:51

## 2025-05-21 RX ADMIN — POLYETHYLENE GLYCOL 3350 17 G: 17 POWDER, FOR SOLUTION ORAL at 13:56

## 2025-05-21 RX ADMIN — CYCLOBENZAPRINE HYDROCHLORIDE 5 MG: 5 TABLET, FILM COATED ORAL at 02:33

## 2025-05-21 RX ADMIN — GABAPENTIN 400 MG: 400 CAPSULE ORAL at 19:54

## 2025-05-21 RX ADMIN — DULOXETINE 90 MG: 60 CAPSULE, DELAYED RELEASE ORAL at 09:52

## 2025-05-21 RX ADMIN — SIMETHICONE 80 MG: 80 TABLET, CHEWABLE ORAL at 12:32

## 2025-05-21 RX ADMIN — ONDANSETRON 4 MG: 4 TABLET, ORALLY DISINTEGRATING ORAL at 21:33

## 2025-05-21 RX ADMIN — PANTOPRAZOLE SODIUM 40 MG: 40 TABLET, DELAYED RELEASE ORAL at 09:52

## 2025-05-21 RX ADMIN — POTASSIUM CHLORIDE 20 MEQ: 1500 TABLET, EXTENDED RELEASE ORAL at 09:50

## 2025-05-21 RX ADMIN — METOCLOPRAMIDE 10 MG: 10 TABLET ORAL at 14:02

## 2025-05-21 RX ADMIN — GABAPENTIN 400 MG: 400 CAPSULE ORAL at 09:50

## 2025-05-21 RX ADMIN — PANCRELIPASE 8 CAPSULE: 60000; 12000; 38000 CAPSULE, DELAYED RELEASE PELLETS ORAL at 13:58

## 2025-05-21 RX ADMIN — SUCRALFATE 1 G: 1 TABLET ORAL at 09:51

## 2025-05-21 RX ADMIN — SUCRALFATE 1 G: 1 TABLET ORAL at 12:32

## 2025-05-21 RX ADMIN — SIMETHICONE 80 MG: 80 TABLET, CHEWABLE ORAL at 17:40

## 2025-05-21 RX ADMIN — FAMOTIDINE 20 MG: 20 TABLET, FILM COATED ORAL at 22:15

## 2025-05-21 RX ADMIN — GABAPENTIN 400 MG: 400 CAPSULE ORAL at 13:56

## 2025-05-21 RX ADMIN — TRAZODONE HYDROCHLORIDE 200 MG: 100 TABLET ORAL at 19:54

## 2025-05-21 RX ADMIN — METOCLOPRAMIDE 10 MG: 10 TABLET ORAL at 19:54

## 2025-05-21 RX ADMIN — SUCRALFATE 1 G: 1 TABLET ORAL at 17:40

## 2025-05-21 RX ADMIN — SIMETHICONE 80 MG: 80 TABLET, CHEWABLE ORAL at 19:54

## 2025-05-21 RX ADMIN — TOPIRAMATE 100 MG: 50 TABLET, FILM COATED ORAL at 19:54

## 2025-05-21 RX ADMIN — ACETAMINOPHEN 650 MG: 325 TABLET, FILM COATED ORAL at 19:54

## 2025-05-21 RX ADMIN — PANCRELIPASE 8 CAPSULE: 60000; 12000; 38000 CAPSULE, DELAYED RELEASE PELLETS ORAL at 17:42

## 2025-05-21 RX ADMIN — HYDROCORTISONE 15 MG: 10 TABLET ORAL at 19:54

## 2025-05-21 RX ADMIN — SUCRALFATE 1 G: 1 TABLET ORAL at 22:15

## 2025-05-21 RX ADMIN — ACETAMINOPHEN 650 MG: 325 TABLET, FILM COATED ORAL at 09:51

## 2025-05-21 RX ADMIN — PANCRELIPASE 8 CAPSULE: 60000; 12000; 38000 CAPSULE, DELAYED RELEASE PELLETS ORAL at 09:54

## 2025-05-21 RX ADMIN — THIAMINE HCL TAB 100 MG 100 MG: 100 TAB at 09:53

## 2025-05-21 RX ADMIN — SENNOSIDES AND DOCUSATE SODIUM 2 TABLET: 50; 8.6 TABLET ORAL at 19:54

## 2025-05-21 RX ADMIN — OXYCODONE 10 MG: 5 TABLET ORAL at 02:33

## 2025-05-21 RX ADMIN — PANTOPRAZOLE SODIUM 40 MG: 40 TABLET, DELAYED RELEASE ORAL at 19:54

## 2025-05-21 RX ADMIN — ACETAMINOPHEN 650 MG: 325 TABLET, FILM COATED ORAL at 13:57

## 2025-05-21 RX ADMIN — METOCLOPRAMIDE 10 MG: 10 TABLET ORAL at 09:51

## 2025-05-21 RX ADMIN — OXYCODONE 10 MG: 5 TABLET ORAL at 09:54

## 2025-05-21 ASSESSMENT — ACTIVITIES OF DAILY LIVING (ADL)
ADLS_ACUITY_SCORE: 58
ADLS_ACUITY_SCORE: 62
ADLS_ACUITY_SCORE: 58
ADLS_ACUITY_SCORE: 62
ADLS_ACUITY_SCORE: 58

## 2025-05-21 NOTE — PROGRESS NOTES
Inpatient Diabetes Management Service: Daily Progress Note     HPI: Chantell Kidd is a 59 year old female admitted on 5/15/2025. 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 Inpatient Diabetes Service consulted for hyperglycemia and then hypoglycemia.          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 Poor control  (A1c 15.8 %  4/30/2025, Hgb: 10.5)  Enteral Feed/Steroid induced  Hyperglycemia  Abdominal pain     Plan/Recommendations:    - Lantus 10 units at 09:30 am    - NPH 7 units every 24 hours  for the tube feeds, 45 ml/hour --> retimed to 0930  -NPH 8 units q pm to cover KF TF running at 45 ml/hour --> re-timed to 2130   - Continue Novolog Meal Coverage: 1 units per 12 g CHO, TID AC and 1:15  PRN with snacks/supplements   - BG monitoring:  before meals, bedtime, and 2 am  - Continue correction scale (ISF 50) before meals, bedtime, and 2 am  -PRN D10W will be needed to prevent hypoglycemia in case of unexpected TF interruption: 50 ml/h will provide 75% of the carbs in     - Hypoglycemia protocol    - Carb counting protocol           Discussion:   Fluctuations in BG appear to be largely related to uncovered carbs. BG running tight during the day yesterday. Will slightly reduce am NPH and increase PM NPH (total of 15 units of NPH per day).. Reviewed withe patient if she had eaten last night as BGS elevated this am. Denies any oral intake overnight. Increase in glucose could be related to increased pain or when RELiZORB cartridge was changed at 2330.  Cr 0.47 GFR 90 Anion gap 6 Bicarb 25 WBC 8.8, Hgb 11.4    Addendum:  at 12pm. Received  correction, Lantus and NPH at 10 am. Timing of insulin and eating is off today.. Will continue current dosing.      Discharge Planning: (tentative) ? Dexcom  Medications: TBD    Test Claims: TBD none needed.   Education:  Needs to be assessed closer to discharge.    Outpatient Follow-up:  [facility] Endocrinology: Libby Jay RN- just saw on 5/13/2025 and 8/21/25 with Dr. Maher    Please notify Inpatient Diabetes Service if changes are planned to steroids, nutrition, TPN/TF and anticipated procedures requiring prolonged NPO status.         Interval History/Review of Systems :   The last 24 hours progress and nursing notes reviewed.  Had been receiving golytely for large stool burden. Had BMs yesterday and reports no BMS today. Was able to eat most of breakfast this morning and is feeling slightly improved. She is feeling restricted on her current carb restrictive diet. Reports at home, she eats more carbs.       Planned Procedures/Surgeries: none    Inpatient Glucose Control:       Recent Labs   Lab 05/21/25  0829 05/21/25  0613 05/21/25  0210 05/20/25  2141 05/20/25  1730 05/20/25  1153   * 323* 269* 169* 100* 160*             Medications Impacting Glycemia:   Steroids: PTA hydrocortisone 10 mg am, 15 mg HS (adrenal insufficiency)    D5W containing solutions/medications: d10 at 25 ml/hour, titrate to keep over 110, stopped  Other medications impacting glucose: none         Nutrition:   Orders Placed This Encounter      Moderate Consistent Carb (60 g CHO per Meal) Diet    Supplements:  none  TF: Was on KF peptide 1.5, provides 149g of carbs or 6.2 g of cho per hour TF running at 45 ml and continuous -was on TF prior to admission  TPN: none        Diabetes History: see full consult note for complete diabetes history   Diabetes Type and Duration: Pancreatogenic diabetes s/p total pancreatectomy and islet autotransplant with insulin dependence since 1/2012  GAD65 antibody, zinc transporter 8  antibody, islet antibody, insulin antibody not available on epic search.     Numerous C-peptides:  Component      Latest Ref Rng 8/28/2018  6:47 AM 9/3/2018  6:41 PM 9/6/2018  8:00 AM 9/7/2018  6:55 AM 4/18/2019  11:04 AM 4/3/2025  2:01 PM   C-Peptide      0.9 - 6.9 ng/mL 0.3 (L)  0.2 (L)  1.8  2.8  1.8  0.5 (L)    Patient Fasting?           Yes      PTA Medication Regimen:   She says she was taking what her sheet said that she given on last discharge:  She was discharged on:     Long-acting insulin: glargine (Lantus) - take 4 units once daily at 6pm. Take at same time each day.     2) Rapid-acting insulin: aspart (Novolog) carbohydrate coverage/mealtime insulin - take 1 unit per 14 grams of carbohydrates before meals and snacks.     3) Rapid-acting insulin: aspart (Novolog) correction - see chart below. Take for high blood glucoses three times daily before meals when eating (or every 4-6 hours when receiving tube feeding) and at bedtime.  You may add the correction dose to the carbohydrate coverage/mealtime insulin dose and give in one injection--ideally 10-15 minutes before a meal.  You may take the correction dose even if you skip a meal (as long as it has been 4 hours since previous correction dose).      Blood Glucose Insulin Before Meals When Eating or every 4-6 hours when receiving tube feeding:   Less than 175 0 units   175 -224  1 units   225 - 274 2 units   275 - 324 3 units   325 - 374 4 units    375 - 424  5 units   425 - 474 6 units   475 or more 7 units         4) Intermediate-acting insulin: Novolin N (NPH). Take to cover tube feeds (dosed for Sagrario [place]elli at 45 ml/hr)   - Take Novolin N (NPH) 4 units at 9AM   - Take Novolin N (NPH) 3 units at 9PM      (If your rate of tube feed were to decrease, you can reference the list below)  If TF rate at 20 ml per hour, give 1 units  If TF rate at 30 ml per hour, give 3 units  If TF rate from 40-45 ml/hr, give 5 units  Missing doses?: denies  Historical Diabetes  "Medications: MDI, insulin pumps     When she was in [place] during the winter and was discharged on TF:   - Lantus 18 units AM, 12 units PM (Covering TF)   - Novolog ICR 1:16  - Novolog correction 1:35 (more intuitive dosing        Glucose monitoring device/frequency/trends: Dexcom was put on her on 5/13/2025 by diabetes ed       Her glucose meter(checking 4-6 times daily) running 400 to \"high\" for the past 3 days since she has been sick.       Outpatient Diabetes Provider: Dr Maher  Formal Diabetes Education/Educator: Libby Jay, diabetes ed saw her on 5/13/2025, sara Bean saw her on 4/21/2025        Physical Exam:   /68 (BP Location: Left arm)   Pulse 80   Temp 98.2  F (36.8  C) (Oral)   Resp 13   Wt 47.8 kg (105 lb 6.1 oz)   SpO2 95%   BMI 19.27 kg/m    General:  well appearing, in no acute distress, lying in bed.   HEENT:  NC/AT. MMM, sclera not injected  Mental Status:  Alert and oriented x3  Psych:   Cooperative, good eye contact, full/appropriate affect           Data:     Lab Results   Component Value Date    A1C 15.8 (H) 04/30/2025    A1C 19.1 (H) 04/15/2025    A1C 18.9 (H) 04/03/2025    A1C 17.1 (H) 01/23/2025    A1C 11.1 (H) 03/02/2023    A1C 7.3 (H) 11/09/2020    A1C 6.7 (A) 11/21/2019    A1C 8.2 (H) 06/11/2019    A1C Canceled, Test credited 06/10/2019    A1C 9.6 (H) 04/18/2019       ROUTINE IP LABS (Last four results)  BMP  Recent Labs   Lab 05/21/25  0829 05/21/25  0613 05/21/25  0210 05/20/25  2141 05/20/25  0901 05/20/25  0559 05/18/25  0832 05/18/25  0611 05/17/25  0944 05/17/25  0701   NA  --  133*  --   --   --  137  --  135  --  133*   POTASSIUM  --  4.0  --   --   --  3.9  --  4.1  --  4.3   CHLORIDE  --  102  --   --   --  104  --  101  --  98   ELIZA  --  8.7*  --   --   --  8.1*  --  8.6*  --  8.6*   CO2  --  25  --   --   --  22  --  25  --  26   BUN  --  14.6  --   --   --  14.5  --  15.1  --  12.7   CR  --  0.47*  --   --   --  0.42*  --  0.42*  --  0.49*   * " 323* 269* 169*   < > 274*   < > 218*   < > 306*    < > = values in this interval not displayed.     CBC  Recent Labs   Lab 05/21/25  0613 05/18/25  0611 05/17/25  0701 05/16/25  0617   WBC 8.8 9.9 8.8 10.8   RBC 3.61* 3.42* 3.61* 3.60*   HGB 11.4* 10.7* 11.4* 11.6*   HCT 33.8* 32.2* 34.0* 33.5*   MCV 94 94 94 93   MCH 31.6 31.3 31.6 32.2   MCHC 33.7 33.2 33.5 34.6   RDW 13.2 13.0 13.1 12.8   * 573* 625* 636*     INRNo lab results found in last 7 days.    Inpatient Diabetes Service will continue to follow, please don't hesitate to contact the team with any questions or concerns.     BEE Felix CNP    Plan discussed with patient, bedside RN, and primary team via this note.    To contact Inpatient Diabetes Service:     7 AM - 5 PM: Page the IDS DAVID following the patient that day (see filed or incomplete progress notes/consult notes under Endocrinology)    OR if uncertain of provider assignment: page job code [phone]    5 PM - 7 AM: First call after hours is to primary service.    For urgent after-hours questions, page job code for on call fellow: [phone]     I spent a total of 45 minutes on the date of the encounter doing prep/post-work, chart review, history and exam, documentation and further activities per the note including lab review, multidisciplinary communication, counseling the patient and/or coordinating care regarding acute hyper/hypoglycemic management, as well as discharge management and planning/communication.

## 2025-05-21 NOTE — PROGRESS NOTES
[facility]    Medicine Progress Note - Hospitalist Service, GOLD TEAM 18    Date of Admission:  5/15/2025    Assessment & Plan     Chantell Kidd is a 59 year old female admitted on 5/15/2025. She has PMH of insulin-dependent diabetes mellitus after pancreatectomy, chronic pancreatitis, migraine, hypothyroidism, and venessa-en-Y with G tube nutrition who presented due to abdominal and back pain, will be admitted to medicine team for management.     5/21  - still with ongoing abdominal pain, had emesis last night not today   - trying to hook up G tube to intermittent suction and see if helps  , if not will ask GI to resee   - patient  wants  regular diet and not diabetic diet, discussed with  endo and will continue diabetic diet for today     Acute on chronic abdominal pain   Transaminitis  Hx Venessa-en-Y (2012) s/p  G-tube and J tube placement for nutrition  S/p cholecystectomy (2004)  Severe constipation   Idiopathic chronic pancreatitis status post  TPIAT in 2012      Patient has had multiple recent admissions due to abdominal pain and issues with PEG tube. Admitted 4/16-2/24 as well as 4/30-5/6 with PEG-J repositioned/replaced by GI on 4/17 as well as 5/2. She presented again 5/15 due to acute worsening of abdominal pain, worse with TF though she has been able to continue them. Reports fever x1 earlier in the week and has since been using Tylenol without additional fever. Note she has been using Tylenol 1000mg q6 for the last month. Vitals on admit with mild tachycardia to 103, temp 37.2, BP WNL. Overall improved after IVF administration and pain control. CT AP w/o on admit with no acute abdominal findings, GJ in place. Labs concerning for mildly elevated LFTs with , ALT 89, Alk phos 152. Tbili 0.3. WBC WNL. Patient feels unable to adequately manage abdominal pain at home at this time; previously required IP Pain team assistance with plan to establish OP in the coming  "months.   - 5/21 still with ongoing abdominal pain , trying to hook up G tube to intermittent suction and see if helps  , if not will ask GI to resee   -- GI consulted, appreciate recommendations              > No urgent need for procedures at this time,              > Hepatic US:suggestive of steatosis and chronic parenchymal liver disease. central pneumobilia, stable compared to CT 5/15/2025.       -  Pain plan:               > Pain team consulted, appreciate recommendations.      Flexeril 5-10mg PO Q 8 hours PRN.  Oxycodone 5-10mg PO Q 4 hours PRN                    To taper,   oxycodone 5-10mg PO Q 4 hours PRN x 1 day then decrease to Q 6 hours PRN x 1day then to Q 8 hours PRN x 1 day then to Q 12 hours PRN x 1day then to 1/day PRN x 1 day and STOP.      Minimize IV Dilaudid 0.2-0.4mg IV Q 4 hours PRN x 24 hours.  If no new acute pathology causing pain, would discontinue.      Simethicone as needed  Lidocaine patches 1-3 patches Q 24 hours, 12 hours on and 12 hours off  Menthol patches, 1 patch TD Q 8 hours  Aggressive bowel regimen   Keep pain clinic appt. On 6/10/2025 with Dr. Kapadia.     -- Continue PTA Creon 96,000U TID   -- Continue PTA famotidine, protonix, Linzess, reglan and sucralfate  -- Zofran PRN for nausea   -- MiraLAX 3 times daily, senna Colace twice daily.  Dulcolax and tapwater enema as needed.     GI consultation 5/16: Recommendations reviewed.  Appreciate assistance.  - per GI \" s o far with negative hepatitis panel, F-actin levels, A1AT, PETh, ceruloplasmin, IgG, unremarkable iron studies. Most likely overall related to MAFLD with some labs still pending. Transaminitis overall improving. \"  - follow up  as out patient  for possible fiberoscan  to assess fibrosis  - A1At 123,   -Tissues transglutaminase IgA 1, IgG < 0.6   -IgG 729   - PETH ceruloplasmin  29 < 10   - ferritin 38   -   - ERLINDA negative   -F-actin ( smooth muscle ) 2   - viral hepatitis juts ordered so pending    - " "mitochondrial M2 An IgG  pending   - liver US \"1.  Coarse hepatic echotexture and increased echogenicity suggestive  of steatosis and chronic parenchymal liver disease.    2.  Findings compatible with central pneumobilia, stable compared to  CT 5/15/2025.  \"  -. Can consider fibroscan outpatient with PCP to assess for liver fibrosis.   - Continue J feeds as tolerated. Use J tube for feeding, G tube for venting.   - Consider Reglan 10 mg IV TID for nausea.   - Consider promethazine q6h PRN as well.  - Continue PERT, PPI therapy.  - Minimize narcotic use.  - Continue bowel regimen.      5/19/2025  GI follow-up:   Work-up so far with negative hepatitis panel, F-actin levels, A1AT, PETh, ceruloplasmin, IgG, unremarkable iron studies. Most likely overall related to MAFLD with some labs still pending. Transaminitis overall improving.   Can follow-up outpatient with PCP for possible fibroscan to assess for fibrosis. No further work-up required at this time. GI will sign off at this time.      Follow-up in GI clinic 06/18/2025         C/f recent ANGELINA, improved  Patient had labs with PCP during hospital follow up appointment on 5/13 during which her Cr was 3.05, up from her baseline of ~0.4. She presented on 5/15 to the ED with abdominal pain that radiates to the back. Also complaining of increasing urinary frequency and mild dysuria. Cr on admit back to baseline of 0.41. UA negative for signs of infection, though with significant glucosuria which may be contributing to symptoms. Question outpatient lab accuracy.  - creatinine now remains ~ 0.4      Post-pancreatectomy diabetes (Type 1)  Chronic Pancreatitis and Sphincter of Oddi dysfunction s/p TPAIT (1/2012)  Patient has poorly controlled diabetes with multiple presentations with glucose >700. Today, presented with BG of 571. Most recent HbA1c of 15.8 4/30/25. No anion gap, Ketones <0.18. In the ED, she was given 1L IVF and glucose improved to 354. Has previously been seen " by inpatient endocrinology team with concern for patient not using insulin at home, though she reports taking her insulin as directed prior to admission. Most recent OP Endo visit was on 5/13. Home regimen includes Lantus 18U qAM and at bedtime, NPH 8U qAM, and Novolog sliding scale, and ICR 1U:12CHO with meals. Most readings at home are significantly elevated and this has been a target of outpatient management.   -- Endocrinology consulted, appreciate recommendations  -- Diabetes regimen per endocrine.  -- Continue PTA Creon 96,000U TID with meals          BLE Edema  Has been ongoing for several months, she notes being started on Lasix 20mg daily and this was increased to 40mg BID by provider in [place] in the fall. She had BLE US during most recent admission 4/30/25 that was negative for DVT and confirmed soft tissue edema bilaterally. Over the last couple weeks, left leg is slightly more swollen than the right. No history of injury, fracture, or surgery on this leg before. No erythema or wounds.  Better   -- holding PTA Lasix 40mg BID  -- GAIL hose during the day     Malnutrition   Cachexia   GJ in place with continuous TF at 45ml/hour. Reports she has been able to continue at this rate despite abdominal discomfort.   -- Nutrition consulted, restart tube feeding                   History of adrenal insufficiency  Followed by Dr. Maher. Previously suppressed AM cortisol and has been on empiric therapy for possible adrenal insufficiency, unclear if central vs transient. Had previously been unable to wean steroids due to hypoglycemia episodes. Most recently saw Dr Maher 4/3/25 and goal is to retest in the near future with low dose ACTH stimulation testing.  -- Continue PTA hydrocortisone 10mg in the AM and 15mg in the PM   -- monitor.      Hypothyroidism  -- Continue PTA levothyroxine      MDD, Anxiety  Insomnia  -- Continue PTA duloxetine, trazodone, topiramate     Mild Hypokalemia: replaced        Diet: Adult  Formula Drip Feeding: Continuous [place] Peptide 1.5; Jejunostomy; Goal Rate: 45 ml/hr. Please see Relizorb orders. Please abide by 8 hr hang time for open systems; mL/hr  Moderate Consistent Carb (60 g CHO per Meal) Diet    DVT Prophylaxis: Ambulate every shift  [place]kee Catheter: Not present  Lines: None     Cardiac Monitoring: None  Code Status: Full Code      Clinically Significant Risk Factors         # Hyponatremia: Lowest Na = 133 mmol/L in last 2 days, will monitor as appropriate   # Hypocalcemia: Lowest Ca = 8.1 mg/dL in last 2 days, will monitor and replace as appropriate     # Hypoalbuminemia: Lowest albumin = 3.3 g/dL at 5/18/2025  6:11 AM, will monitor as appropriate                 # Severe Malnutrition: based on nutrition assessment and treatment provided per dietitian's recommendations.    # Financial/Environmental Concerns: none         Social Drivers of Health    Food Insecurity: Low Risk  (5/15/2025)    Food Insecurity     Within the past 12 months, did you worry that your food would run out before you got money to buy more?: No     Within the past 12 months, did the food you bought just not last and you didn t have money to get more?: No   Recent Concern: Food Insecurity - High Risk (4/16/2025)    Food Insecurity     Within the past 12 months, did you worry that your food would run out before you got money to buy more?: Yes     Within the past 12 months, did the food you bought just not last and you didn t have money to get more?: Yes   Depression: At risk (5/13/2025)    PHQ-2     PHQ-2 Score: 5          Disposition Plan     Medically Ready for Discharge: Anticipated in 2-4 Days             Rosy Basilio MD  Hospitalist Service, GOLD TEAM 18  [facility]  Securely message with FRM Study Course (more info)  Text page via [facility] Paging/Directory   See signed in provider for up to date coverage  information  ______________________________________________________________________    Interval History   Still with ongoing abdominal pain  , states had emesis last night nothing since, seesm abdominal pain worse sfter eats . Also bit bloated still passing stool    Says she does not like to consistent carb diet and wanst regular diet     Physical Exam   Vital Signs: Temp: 98.2  F (36.8  C) Temp src: Oral BP: 112/68 Pulse: 80   Resp: 13 SpO2: 95 % O2 Device: None (Room air)    Weight: 105 lbs 6.08 oz  General appearence: awake alert  in  no apparent distress    NECK : supple  RESPIRATORY: lungs clear   CARDIOVASCULAR:S1 S2 regular rate and rhythm,   GASTROINTESTINAL: G J tube site looks good , + bowel sounds  . Slightly distended but soft, some lower abdominal tenderness no rebound or guarding   SKIN: warm and dry, no mottling noted   NEUROLOGIC; awake alert and oriented  EXTREMITIES: no clubbing, cyanosis or edema , moves all extremity,     Data     I have personally reviewed the following data over the past 24 hrs:    8.8  \   11.4 (L)   / 618 (H)     133 (L) 102 14.6 /  278 (H)   4.0 25 0.47 (L) \     ALT: 37 AST: 30 AP: 111 TBILI: 0.2   ALB: 3.4 (L) TOT PROTEIN: 6.3 (L) LIPASE: N/A       Imaging results reviewed over the past 24 hrs:   No results found for this or any previous visit (from the past 24 hours).

## 2025-05-21 NOTE — PLAN OF CARE
Goal Outcome Evaluation:      Plan of Care Reviewed With: patient    Overall Patient Progress: improving    VS: Temp: 97.9  F (36.6  C) Temp src: Oral BP: 100/71 Pulse: 82   Resp: 16 SpO2: 94 % O2 Device: None (Room air)      O2: SpO2 > 94% and stable on RA. LS clear and equal bilaterally. Denies chest pain and SOB.    Output: Voids spontaneously without difficulty to bathroom and BSC.   Last BM: 5/20/2025, denies abdominal discomfort. BS active.   Activity: Up with A* 1 using walker and GB   Skin: WDL except, Bruises to BUE.    Pain: Pain managed with PRN Flexeril and Oxycodone.    CMS: Intact, AOx4. Baseline numbness and tingling to all extremities.    Dressing: J/G drain dressing changed by pt this shift.    Diet: Moderate consistence carb diet with continues TF running at 45 ml/hr. Intermittent nausea.  BG check at bedtime 169, BG overnight 269   LDA: R PIV SL. G tube . J tube.   Equipment: IV pole, personal belongings,    Plan: Continue with plan of care. Call light within reach, pt able to make needs known.    Additional Info: RELiZORB cartridge was changed this shift at 2330, and due to change at 11:30 AM.                     Patient called to report that she was feeling hot and took her temperature and it was 100.7. She took the gabino hoodie off and rechecked it about 5 minutes later and it was 98.9. She denies any worsening symptoms since Tuesday and this is the first time she's felt hot since she saw Dr. Witt. She denies any new symptoms besides a dry cough that is coming from an itchy/irritated throat and took some Flonase last night - it seemed to help - but it feels that way again, today. She has continued to take Levaquin, as prescribed, but wanted to know if Dr. Witt had any other thoughts.

## 2025-05-21 NOTE — PLAN OF CARE
Goal Outcome Evaluation:      Plan of Care Reviewed With: patient    Overall Patient Progress: no changeOverall Patient Progress: no change    Outcome Evaluation: PT AOx4, Okay'd to resume tube feedings today, now on mod carb diet. Glucose checks before meals. Continues reporting pain to abdomen, managed w/ prn pain meds. Blancheable redness to buttocks, preventative mepilex dressing palced on coccyx.    Relizorb cartridge needs to be changed Q12h w/ tube feedings, if more are needed request from [facility], #[id number]    Output: Voids spontaneously, denies difficulties    Last BM: 5/20   Activity: Ax1, w/ walker and GB   Skin: Brusing to BUE  Blancheable redness to buttocks   Mepliex to coccyx    Pain: Abd pain managed w/ prn oxy & flexiril   CMS: CMS intact, baseline N&T    Dressing: J/G drain dressing changed     Diet: Clear liquid diet, intermittent nausea, prn zofran given w/ reported relief of symptoms   LDA: R PIV SL    Plan: TBD, possible discharge home w/ home infusion  Will continue to monitor BG, pain and bowel management    Additional Info:

## 2025-05-22 ENCOUNTER — APPOINTMENT (OUTPATIENT)
Dept: CT IMAGING | Facility: CLINIC | Age: 60
End: 2025-05-22
Attending: INTERNAL MEDICINE
Payer: MEDICARE

## 2025-05-22 VITALS
OXYGEN SATURATION: 96 % | DIASTOLIC BLOOD PRESSURE: 62 MMHG | TEMPERATURE: 97.6 F | BODY MASS INDEX: 19.27 KG/M2 | RESPIRATION RATE: 17 BRPM | HEART RATE: 89 BPM | WEIGHT: 105.38 LBS | SYSTOLIC BLOOD PRESSURE: 93 MMHG

## 2025-05-22 LAB
ALBUMIN UR-MCNC: NEGATIVE MG/DL
ANION GAP SERPL CALCULATED.3IONS-SCNC: 9 MMOL/L (ref 7–15)
APPEARANCE UR: CLEAR
BACTERIA #/AREA URNS HPF: ABNORMAL /HPF
BILIRUB UR QL STRIP: NEGATIVE
BUN SERPL-MCNC: 17.6 MG/DL (ref 8–23)
CALCIUM SERPL-MCNC: 8.7 MG/DL (ref 8.8–10.4)
CHLORIDE SERPL-SCNC: 103 MMOL/L (ref 98–107)
COLOR UR AUTO: ABNORMAL
CREAT SERPL-MCNC: 0.44 MG/DL (ref 0.51–0.95)
EGFRCR SERPLBLD CKD-EPI 2021: >90 ML/MIN/1.73M2
GLUCOSE BLDC GLUCOMTR-MCNC: 162 MG/DL (ref 70–99)
GLUCOSE BLDC GLUCOMTR-MCNC: 236 MG/DL (ref 70–99)
GLUCOSE BLDC GLUCOMTR-MCNC: 264 MG/DL (ref 70–99)
GLUCOSE BLDC GLUCOMTR-MCNC: 265 MG/DL (ref 70–99)
GLUCOSE BLDC GLUCOMTR-MCNC: 294 MG/DL (ref 70–99)
GLUCOSE SERPL-MCNC: 310 MG/DL (ref 70–99)
GLUCOSE UR STRIP-MCNC: >=1000 MG/DL
HCO3 SERPL-SCNC: 25 MMOL/L (ref 22–29)
HGB UR QL STRIP: NEGATIVE
HOLD SPECIMEN: NORMAL
KETONES UR STRIP-MCNC: NEGATIVE MG/DL
LEUKOCYTE ESTERASE UR QL STRIP: NEGATIVE
NITRATE UR QL: NEGATIVE
PH UR STRIP: 6.5 [PH] (ref 5–7)
POTASSIUM SERPL-SCNC: 4.2 MMOL/L (ref 3.4–5.3)
RBC URINE: 0 /HPF
SODIUM SERPL-SCNC: 137 MMOL/L (ref 135–145)
SP GR UR STRIP: 1.02 (ref 1–1.03)
UROBILINOGEN UR STRIP-MCNC: NORMAL MG/DL
WBC URINE: 2 /HPF

## 2025-05-22 PROCEDURE — 250N000011 HC RX IP 250 OP 636

## 2025-05-22 PROCEDURE — S9342 HIT ENTERAL PUMP DIEM: HCPCS

## 2025-05-22 PROCEDURE — 250N000011 HC RX IP 250 OP 636: Performed by: INTERNAL MEDICINE

## 2025-05-22 PROCEDURE — 250N000012 HC RX MED GY IP 250 OP 636 PS 637: Performed by: STUDENT IN AN ORGANIZED HEALTH CARE EDUCATION/TRAINING PROGRAM

## 2025-05-22 PROCEDURE — 250N000013 HC RX MED GY IP 250 OP 250 PS 637: Performed by: STUDENT IN AN ORGANIZED HEALTH CARE EDUCATION/TRAINING PROGRAM

## 2025-05-22 PROCEDURE — 82310 ASSAY OF CALCIUM: CPT | Performed by: INTERNAL MEDICINE

## 2025-05-22 PROCEDURE — 81001 URINALYSIS AUTO W/SCOPE: CPT | Performed by: INTERNAL MEDICINE

## 2025-05-22 PROCEDURE — 999N000127 HC STATISTIC PERIPHERAL IV START W US GUIDANCE

## 2025-05-22 PROCEDURE — 250N000013 HC RX MED GY IP 250 OP 250 PS 637

## 2025-05-22 PROCEDURE — 250N000009 HC RX 250: Performed by: INTERNAL MEDICINE

## 2025-05-22 PROCEDURE — 71275 CT ANGIOGRAPHY CHEST: CPT

## 2025-05-22 PROCEDURE — 71275 CT ANGIOGRAPHY CHEST: CPT | Mod: 26 | Performed by: RADIOLOGY

## 2025-05-22 PROCEDURE — 99232 SBSQ HOSP IP/OBS MODERATE 35: CPT | Performed by: NURSE PRACTITIONER

## 2025-05-22 PROCEDURE — 120N000002 HC R&B MED SURG/OB UMMC

## 2025-05-22 PROCEDURE — 250N000013 HC RX MED GY IP 250 OP 250 PS 637: Performed by: INTERNAL MEDICINE

## 2025-05-22 PROCEDURE — 99232 SBSQ HOSP IP/OBS MODERATE 35: CPT

## 2025-05-22 PROCEDURE — 36415 COLL VENOUS BLD VENIPUNCTURE: CPT | Performed by: INTERNAL MEDICINE

## 2025-05-22 PROCEDURE — 99233 SBSQ HOSP IP/OBS HIGH 50: CPT | Performed by: INTERNAL MEDICINE

## 2025-05-22 PROCEDURE — 999N000040 HC STATISTIC CONSULT NO CHARGE VASC ACCESS

## 2025-05-22 RX ORDER — CYCLOBENZAPRINE HCL 5 MG
5 TABLET ORAL
Status: DISCONTINUED | OUTPATIENT
Start: 2025-05-22 | End: 2025-05-22

## 2025-05-22 RX ORDER — OXYCODONE HYDROCHLORIDE 5 MG/1
5-10 TABLET ORAL EVERY 8 HOURS PRN
Refills: 0 | Status: DISCONTINUED | OUTPATIENT
Start: 2025-05-22 | End: 2025-05-23

## 2025-05-22 RX ORDER — ENOXAPARIN SODIUM 100 MG/ML
40 INJECTION SUBCUTANEOUS EVERY 24 HOURS
Status: DISCONTINUED | OUTPATIENT
Start: 2025-05-22 | End: 2025-05-24 | Stop reason: HOSPADM

## 2025-05-22 RX ORDER — CYCLOBENZAPRINE HCL 10 MG
10 TABLET ORAL 3 TIMES DAILY PRN
Status: DISCONTINUED | OUTPATIENT
Start: 2025-05-22 | End: 2025-05-24 | Stop reason: HOSPADM

## 2025-05-22 RX ORDER — IOPAMIDOL 755 MG/ML
50 INJECTION, SOLUTION INTRAVASCULAR ONCE
Status: COMPLETED | OUTPATIENT
Start: 2025-05-22 | End: 2025-05-22

## 2025-05-22 RX ADMIN — ACETAMINOPHEN 650 MG: 325 TABLET, FILM COATED ORAL at 19:59

## 2025-05-22 RX ADMIN — LEVOTHYROXINE SODIUM 125 MCG: 125 TABLET ORAL at 08:44

## 2025-05-22 RX ADMIN — THIAMINE HCL TAB 100 MG 100 MG: 100 TAB at 08:44

## 2025-05-22 RX ADMIN — POLYETHYLENE GLYCOL 3350 17 G: 17 POWDER, FOR SOLUTION ORAL at 20:00

## 2025-05-22 RX ADMIN — LINACLOTIDE 145 MCG: 145 CAPSULE, GELATIN COATED ORAL at 08:52

## 2025-05-22 RX ADMIN — HYDROCORTISONE 15 MG: 10 TABLET ORAL at 19:59

## 2025-05-22 RX ADMIN — CYCLOBENZAPRINE HYDROCHLORIDE 5 MG: 5 TABLET, FILM COATED ORAL at 12:36

## 2025-05-22 RX ADMIN — SIMETHICONE 80 MG: 80 TABLET, CHEWABLE ORAL at 11:21

## 2025-05-22 RX ADMIN — METOCLOPRAMIDE 10 MG: 10 TABLET ORAL at 19:57

## 2025-05-22 RX ADMIN — SIMETHICONE 80 MG: 80 TABLET, CHEWABLE ORAL at 08:44

## 2025-05-22 RX ADMIN — TOPIRAMATE 100 MG: 50 TABLET, FILM COATED ORAL at 08:44

## 2025-05-22 RX ADMIN — SUCRALFATE 1 G: 1 TABLET ORAL at 11:21

## 2025-05-22 RX ADMIN — SUCRALFATE 1 G: 1 TABLET ORAL at 08:44

## 2025-05-22 RX ADMIN — SUCRALFATE 1 G: 1 TABLET ORAL at 16:34

## 2025-05-22 RX ADMIN — PANCRELIPASE 8 CAPSULE: 60000; 12000; 38000 CAPSULE, DELAYED RELEASE PELLETS ORAL at 08:53

## 2025-05-22 RX ADMIN — ACETAMINOPHEN 650 MG: 325 TABLET, FILM COATED ORAL at 08:40

## 2025-05-22 RX ADMIN — LIDOCAINE 4% 2 PATCH: 40 PATCH TOPICAL at 20:00

## 2025-05-22 RX ADMIN — SIMETHICONE 80 MG: 80 TABLET, CHEWABLE ORAL at 16:34

## 2025-05-22 RX ADMIN — HYDROCORTISONE 10 MG: 10 TABLET ORAL at 08:50

## 2025-05-22 RX ADMIN — OXYCODONE 10 MG: 5 TABLET ORAL at 19:05

## 2025-05-22 RX ADMIN — SIMETHICONE 80 MG: 80 TABLET, CHEWABLE ORAL at 19:58

## 2025-05-22 RX ADMIN — CYCLOBENZAPRINE 10 MG: 10 TABLET, FILM COATED ORAL at 17:57

## 2025-05-22 RX ADMIN — GABAPENTIN 400 MG: 400 CAPSULE ORAL at 19:57

## 2025-05-22 RX ADMIN — POLYETHYLENE GLYCOL 3350 17 G: 17 POWDER, FOR SOLUTION ORAL at 14:31

## 2025-05-22 RX ADMIN — GABAPENTIN 400 MG: 400 CAPSULE ORAL at 08:44

## 2025-05-22 RX ADMIN — POLYETHYLENE GLYCOL 3350 17 G: 17 POWDER, FOR SOLUTION ORAL at 08:40

## 2025-05-22 RX ADMIN — ENOXAPARIN SODIUM 40 MG: 40 INJECTION SUBCUTANEOUS at 12:36

## 2025-05-22 RX ADMIN — OXYCODONE 10 MG: 5 TABLET ORAL at 12:37

## 2025-05-22 RX ADMIN — METOCLOPRAMIDE 10 MG: 10 TABLET ORAL at 14:30

## 2025-05-22 RX ADMIN — PANCRELIPASE 8 CAPSULE: 60000; 12000; 38000 CAPSULE, DELAYED RELEASE PELLETS ORAL at 11:23

## 2025-05-22 RX ADMIN — FAMOTIDINE 20 MG: 20 TABLET, FILM COATED ORAL at 21:40

## 2025-05-22 RX ADMIN — ONDANSETRON 4 MG: 2 INJECTION INTRAMUSCULAR; INTRAVENOUS at 21:53

## 2025-05-22 RX ADMIN — DULOXETINE 90 MG: 60 CAPSULE, DELAYED RELEASE ORAL at 08:44

## 2025-05-22 RX ADMIN — ONDANSETRON 4 MG: 2 INJECTION INTRAMUSCULAR; INTRAVENOUS at 12:37

## 2025-05-22 RX ADMIN — GABAPENTIN 400 MG: 400 CAPSULE ORAL at 14:30

## 2025-05-22 RX ADMIN — TRAZODONE HYDROCHLORIDE 200 MG: 100 TABLET ORAL at 19:59

## 2025-05-22 RX ADMIN — PANTOPRAZOLE SODIUM 40 MG: 40 TABLET, DELAYED RELEASE ORAL at 08:44

## 2025-05-22 RX ADMIN — PANTOPRAZOLE SODIUM 40 MG: 40 TABLET, DELAYED RELEASE ORAL at 19:58

## 2025-05-22 RX ADMIN — METOCLOPRAMIDE 10 MG: 10 TABLET ORAL at 08:44

## 2025-05-22 RX ADMIN — SUCRALFATE 1 G: 1 TABLET ORAL at 21:40

## 2025-05-22 RX ADMIN — POTASSIUM CHLORIDE 20 MEQ: 1500 TABLET, EXTENDED RELEASE ORAL at 08:44

## 2025-05-22 RX ADMIN — PANCRELIPASE 8 CAPSULE: 60000; 12000; 38000 CAPSULE, DELAYED RELEASE PELLETS ORAL at 19:55

## 2025-05-22 RX ADMIN — ACETAMINOPHEN 650 MG: 325 TABLET, FILM COATED ORAL at 14:30

## 2025-05-22 RX ADMIN — SENNOSIDES AND DOCUSATE SODIUM 2 TABLET: 50; 8.6 TABLET ORAL at 19:58

## 2025-05-22 RX ADMIN — SENNOSIDES AND DOCUSATE SODIUM 2 TABLET: 50; 8.6 TABLET ORAL at 08:44

## 2025-05-22 RX ADMIN — TOPIRAMATE 100 MG: 50 TABLET, FILM COATED ORAL at 19:59

## 2025-05-22 RX ADMIN — OXYCODONE 10 MG: 5 TABLET ORAL at 05:20

## 2025-05-22 RX ADMIN — IOPAMIDOL 50 ML: 755 INJECTION, SOLUTION INTRAVENOUS at 13:19

## 2025-05-22 RX ADMIN — SODIUM CHLORIDE 72 ML: 9 INJECTION, SOLUTION INTRAVENOUS at 13:20

## 2025-05-22 RX ADMIN — METHYLNALTREXONE BROMIDE 12 MG: 12 INJECTION, SOLUTION SUBCUTANEOUS at 17:51

## 2025-05-22 ASSESSMENT — ACTIVITIES OF DAILY LIVING (ADL)
ADLS_ACUITY_SCORE: 62

## 2025-05-22 NOTE — PROGRESS NOTES
[facility]    Medicine Progress Note - Hospitalist Service, GOLD TEAM 18    Date of Admission:  5/15/2025    Assessment & Plan     Chantell Kidd is a 59 year old female admitted on 5/15/2025. She has PMH of insulin-dependent diabetes mellitus after pancreatectomy, chronic pancreatitis, migraine, hypothyroidism, and venessa-en-Y with G tube nutrition who presented due to abdominal and back pain, will be admitted to medicine team for management.     5/22   - CTA  - UA as has urinary incontinence   - ongoing abdominal pain despite G tube being hooke duo to intermittent suction so asking GI to resee ans also asked pain services to see  - no BM on past 2 days despite medications     GI and pain services rec to minimize narcotics and needs aggressive bowel regime as major contribute to her abdominal pain is constipation     Exertional shortness of breath   Chest pain  on deep inspiration  - chest CTA rule out pulmonary embolism , CTA negative for PE   - subcutaneous lovenox started for DVT ppx       Urinary incontinence  - over past few days stats when gets up just looses bladder control  - check UA/UA, need to work on bowels as well      Acute on chronic abdominal pain   Transaminitis  Hx Venessa-en-Y (2012) s/p  G-tube and J tube placement for nutrition  S/p cholecystectomy (2004)  Severe constipation   Idiopathic chronic pancreatitis status post  TPIAT in 2012      Patient has had multiple recent admissions due to abdominal pain and issues with PEG tube. Admitted 4/16-2/24 as well as 4/30-5/6 with PEG-J repositioned/replaced by GI on 4/17 as well as 5/2. She presented again 5/15 due to acute worsening of abdominal pain, worse with TF though she has been able to continue them. Reports fever x1 earlier in the week and has since been using Tylenol without additional fever. Note she has been using Tylenol 1000mg q6 for the last month. Vitals on admit with mild tachycardia to 103, temp  "37.2, BP WNL. Overall improved after IVF administration and pain control. CT AP w/o on admit with no acute abdominal findings, GJ in place. Labs concerning for mildly elevated LFTs with , ALT 89, Alk phos 152. Tbili 0.3. WBC WNL. Patient feels unable to adequately manage abdominal pain at home at this time; previously required IP Pain team assistance with plan to establish OP in the coming months.   - 5/21 still with ongoing abdominal pain ,hooked up G tube to intermittent suction but espinoza snot seem to be helping    -LFT back to normal   -- GI consulted, appreciate recommendations              > No urgent need for procedures at this time,              > Hepatic US:suggestive of steatosis and chronic parenchymal liver disease. central pneumobilia, stable compared to CT 5/15/2025.       -  Pain plan:               > Pain team consulted, appreciate recommendations.      Flexeril 5-10mg PO Q 8 hours PRN.  Oxycodone 5-10mg PO Q 4 hours PRN                    To taper,   oxycodone 5-10mg PO Q 4 hours PRN x 1 day then decrease to Q 6 hours PRN x 1day then to Q 8 hours PRN x 1 day then to Q 12 hours PRN x 1day then to 1/day PRN x 1 day and STOP.      Minimize IV Dilaudid 0.2-0.4mg IV Q 4 hours PRN x 24 hours.  If no new acute pathology causing pain, would discontinue.      Simethicone as needed  Lidocaine patches 1-3 patches Q 24 hours, 12 hours on and 12 hours off  Menthol patches, 1 patch TD Q 8 hours  Aggressive bowel regimen   Keep pain clinic appt. On 6/10/2025 with Dr. Kapadia.     -- Continue PTA Creon 96,000U TID   -- Continue PTA famotidine, protonix, Linzess, reglan and sucralfate  -- Zofran PRN for nausea   -- MiraLAX 3 times daily, senna Colace twice daily.  Dulcolax and tapwater enema as needed.    - discussed with  patient  the importance of bowel regime ,      GI consultation 5/16: Recommendations reviewed.  Appreciate assistance.  - per GI \" s o far with negative hepatitis panel, F-actin levels, A1AT, " "PETh, ceruloplasmin, IgG, unremarkable iron studies. Most likely overall related to MAFLD with some labs still pending. Transaminitis overall improving. \"  - follow up  as out patient  for possible fiberoscan  to assess fibrosis  - A1At 123,   -Tissues transglutaminase IgA 1, IgG < 0.6   -IgG 729   - PETH ceruloplasmin  29 < 10   - ferritin 38   -   - ERLINDA negative   -F-actin ( smooth muscle ) 2   - viral hepatitis juts ordered so pending    - mitochondrial M2 An IgG  pending   - liver US \"1.  Coarse hepatic echotexture and increased echogenicity suggestive  of steatosis and chronic parenchymal liver disease.    2.  Findings compatible with central pneumobilia, stable compared to  CT 5/15/2025.  \"  -. Can consider fibroscan outpatient with PCP to assess for liver fibrosis.   - Continue J feeds as tolerated. Use J tube for feeding, G tube for venting.   - Consider Reglan 10 mg IV TID for nausea.   - Consider promethazine q6h PRN as well.  - Continue PERT, PPI therapy.  - Minimize narcotic use.  - Continue bowel regimen.      5/19/2025  GI follow-up:   Work-up so far with negative hepatitis panel, F-actin levels, A1AT, PETh, ceruloplasmin, IgG, unremarkable iron studies. Most likely overall related to MAFLD with some labs still pending. Transaminitis overall improving.   Can follow-up outpatient with PCP for possible fibroscan to assess for fibrosis. No further work-up required at this time. GI will sign off at this time.      Follow-up in GI clinic 06/18/2025         C/f recent ANGELINA, improved  Patient had labs with PCP during hospital follow up appointment on 5/13 during which her Cr was 3.05, up from her baseline of ~0.4. She presented on 5/15 to the ED with abdominal pain that radiates to the back. Also complaining of increasing urinary frequency and mild dysuria. Cr on admit back to baseline of 0.41. UA negative for signs of infection, though with significant glucosuria which may be contributing to symptoms. " Question outpatient lab accuracy.  - creatinine now remains ~ 0.4      Post-pancreatectomy diabetes (Type 1)  Chronic Pancreatitis and Sphincter of Oddi dysfunction s/p TPAIT (1/2012)  Patient has poorly controlled diabetes with multiple presentations with glucose >700. Today, presented with BG of 571. Most recent HbA1c of 15.8 4/30/25. No anion gap, Ketones <0.18. In the ED, she was given 1L IVF and glucose improved to 354. Has previously been seen by inpatient endocrinology team with concern for patient not using insulin at home, though she reports taking her insulin as directed prior to admission. Most recent OP Endo visit was on 5/13. Home regimen includes Lantus 18U qAM and at bedtime, NPH 8U qAM, and Novolog sliding scale, and ICR 1U:12CHO with meals. Most readings at home are significantly elevated and this has been a target of outpatient management.   -- Endocrinology consulted, appreciate recommendations  -- Diabetes regimen per endocrine.  -- Continue PTA Creon 96,000U TID with meals          BLE Edema  Has been ongoing for several months, she notes being started on Lasix 20mg daily and this was increased to 40mg BID by provider in [place] in the fall. She had BLE US during most recent admission 4/30/25 that was negative for DVT and confirmed soft tissue edema bilaterally. Over the last couple weeks, left leg is slightly more swollen than the right. No history of injury, fracture, or surgery on this leg before. No erythema or wounds.  Better   -- holding PTA Lasix 40mg BID  -- GAIL hose during the day     Malnutrition   Cachexia   GJ in place with continuous TF at 45ml/hour. Reports she has been able to continue at this rate despite abdominal discomfort.   -- Nutrition consulted, restart tube feeding                   History of adrenal insufficiency  Followed by Dr. Maher. Previously suppressed AM cortisol and has been on empiric therapy for possible adrenal insufficiency, unclear if central vs transient.  Had previously been unable to wean steroids due to hypoglycemia episodes. Most recently saw Dr Mhaer 4/3/25 and goal is to retest in the near future with low dose ACTH stimulation testing.  -- Continue PTA hydrocortisone 10mg in the AM and 15mg in the PM   -- monitor.      Hypothyroidism  -- Continue PTA levothyroxine      MDD, Anxiety  Insomnia  -- Continue PTA duloxetine, trazodone, topiramate     Mild Hypokalemia: replaced        Diet: Adult Formula Drip Feeding: Continuous [place] Peptide 1.5; Jejunostomy; Goal Rate: 45 ml/hr. Please see Relizorb orders. Please abide by 8 hr hang time for open systems; mL/hr  Moderate Consistent Carb (60 g CHO per Meal) Diet    DVT Prophylaxis: Ambulate every shift  [place]kee Catheter: Not present  Lines: None     Cardiac Monitoring: None  Code Status: Full Code      Clinically Significant Risk Factors         # Hyponatremia: Lowest Na = 133 mmol/L in last 2 days, will monitor as appropriate       # Hypoalbuminemia: Lowest albumin = 3.3 g/dL at 5/18/2025  6:11 AM, will monitor as appropriate                 # Severe Malnutrition: based on nutrition assessment and treatment provided per dietitian's recommendations.    # Financial/Environmental Concerns: none         Social Drivers of Health    Food Insecurity: Low Risk  (5/15/2025)    Food Insecurity     Within the past 12 months, did you worry that your food would run out before you got money to buy more?: No     Within the past 12 months, did the food you bought just not last and you didn t have money to get more?: No   Recent Concern: Food Insecurity - High Risk (4/16/2025)    Food Insecurity     Within the past 12 months, did you worry that your food would run out before you got money to buy more?: Yes     Within the past 12 months, did the food you bought just not last and you didn t have money to get more?: Yes   Depression: At risk (5/13/2025)    PHQ-2     PHQ-2 Score: 5          Disposition Plan     Medically Ready for  Discharge: Anticipated in 2-4 Days              Rosy Basilio MD  Hospitalist Service, GOLD TEAM 18  [facility]  Securely message with [employer] (more info)  Text page via [employer] Paging/Directory   See signed in provider for up to date coverage information  ______________________________________________________________________    Interval History   Still with ongoing abdominal pain not better post venting , now also tells me has exertional shortness of breath  and some dizziness also some lower rib cage pain if takes a deep breath but no chest pain  .     Physical Exam   Vital Signs: Temp: 98.4  F (36.9  C) Temp src: Oral BP: 132/87 Pulse: 82   Resp: 16 SpO2: 95 % O2 Device: None (Room air)    Weight: 105 lbs 6.08 oz  General appearence: awake alert  in  no apparent distress    NECK : supple  RESPIRATORY: lungs clear   CARDIOVASCULAR:S1 S2 regular rate and rhythm,   GASTROINTESTINAL: G J tube site looks good , + bowel sounds  . Slightly distended but soft, some lower abdominal tenderness no rebound or guarding   SKIN: warm and dry, no mottling noted   NEUROLOGIC; awake alert and oriented  EXTREMITIES: no clubbing, cyanosis or edema , moves all extremity,     Data     I have personally reviewed the following data over the past 24 hrs:    N/A  \   N/A   / N/A     137 103 17.6 /  310 (H)   4.2 25 0.44 (L) \       Imaging results reviewed over the past 24 hrs:   No results found for this or any previous visit (from the past 24 hours).

## 2025-05-22 NOTE — PROGRESS NOTES
Care Management Follow Up    Length of Stay (days): 7    Expected Discharge Date: 05/23/2025     Concerns to be Addressed: discharge planning     Patient plan of care discussed at interdisciplinary rounds: No    Anticipated Discharge Disposition: Home, Home Care, Home Infusion     Anticipated Discharge Services: None  Anticipated Discharge DME: None    Patient/family educated on Medicare website which has current facility and service quality ratings: no  Education Provided on the Discharge Plan: No  Patient/Family in Agreement with the Plan: yes    Referrals Placed by CM/SW:    Private pay costs discussed: Not applicable    Discussed  Partnership in Safe Discharge Planning  document with patient/family: No     Handoff Completed: No, handoff not indicated or clinically appropriate    Additional Information:  Patient not medically ready to discharge home at this time. Patient currently being followed by Medicine Team, GI, Endocrine and Pain Team.     [facility] will continue to follow for discharge planning. RAMAN with [facility] and [facility] at discharge.     Next Steps:   Pain management  Hypo/Hyperglycemia management    Elaine Santamaria RN, BSN  Care Coordinator, [facility] Orth[facility]  Phone [phone]

## 2025-05-22 NOTE — PROGRESS NOTES
Inpatient Diabetes Management Service: Daily Progress Note     HPI: Chantell Kidd is a 59 year old female admitted on 5/15/2025. 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 Inpatient Diabetes Service consulted for hyperglycemia and then hypoglycemia.          Assessment/Plan:     1.Post-pancreatectomy diabetes s/p TPIAT 1/2012 treated as Type 1 Diabetes Mellitus complicated by peripheral neuropathy, hypoglycemia unawareness, and hyperglycemia. Poor control  (A1c 15.8 %  4/30/2025, Hgb: 10.5)  Enteral Feed/Steroid induced  Hyperglycemia  Abdominal pain     Plan/Recommendations:    - Lantus 10 units at 09:30 am    - Increase NPH 8 units every 24 hours  for the tube feeds, 45 ml/hour --> 0930  -Increase NPH 10 units q pm to cover KF TF running at 45 ml/hour --> 2100  - Continue Novolog Meal Coverage: 1 units per 12 g CHO, TID AC and 1:15  PRN with snacks/supplements   - BG monitoring:  before meals, bedtime, and 2 am  - Continue correction scale (ISF 50) before meals, bedtime, and 2 am  -PRN D10W will be needed to prevent hypoglycemia in case of unexpected TF interruption: 50 ml/h will provide 75% of the carbs in     - Patient care order: Be mindful of checking BG too close to eating, cover all snacks.   Caution with correction on BG based on a post-prandial reading. (Post-prandial reading is one which is taken and the patient has eaten within the past 3-4 hours).  May cause precipitous drops in BG resulting in dangerously low BG.  - Hypoglycemia protocol    - Carb counting protocol           Discussion:   Per notes, BG elevated last night as patient ate her dinner without letting the RN know. She received  meal insulin and carb coverage  late after the pt finished eating. As BGs during the  day were unreliable due to mismatch of insulin and BG checks. Will increase NPH this am. Cr 0.44 GFR 90 Anion gap 9 Bicarb 25     Addendum 12 pm: AM BG corrected 3 hours later. Reviewed with bedside nurse to check blood sugar right before patient eats to prevent hypoglycemia. Reminded patient also to call for blood sugar checks prior to eating.     Addendum 1600: Continues to eat well. BGs still elevated to 260's this afternoon, will increase NPH to 10 units for PM.     Discharge Planning: (tentative) ? Dexcom  Medications: TBD    Test Claims: TBD none needed.   Education:  Needs to be assessed closer to discharge.    Outpatient Follow-up:  recommend [facility] Endocrinology: Libby Jay RN- just saw on 5/13/2025 and 8/21/25 with Dr. Maher    Please notify Inpatient Diabetes Service if changes are planned to steroids, nutrition, TPN/TF and anticipated procedures requiring prolonged NPO status.         Interval History/Review of Systems :   The last 24 hours progress and nursing notes reviewed.  No events overnight.    Planned Procedures/Surgeries: none    Inpatient Glucose Control:       Recent Labs   Lab 05/22/25  0843 05/22/25  0553 05/22/25  0138 05/21/25  2324 05/21/25  2213 05/21/25  1731   * 310* 236* 297* 333* 161*             Medications Impacting Glycemia:   Steroids: PTA hydrocortisone 10 mg am, 15 mg HS (adrenal insufficiency)    D5W containing solutions/medications: d10 at 25 ml/hour, titrate to keep over 110, stopped  Other medications impacting glucose: none         Nutrition:   Orders Placed This Encounter      Moderate Consistent Carb (60 g CHO per Meal) Diet    Supplements:  none  TF: Was on KF peptide 1.5, provides 149g of carbs or 6.2 g of cho per hour TF running at 45 ml and continuous -was on TF prior to admission  TPN: none        Diabetes History: see full consult note for complete diabetes history   Diabetes Type and Duration: Pancreatogenic diabetes s/p total pancreatectomy and  islet autotransplant with insulin dependence since 1/2012  GAD65 antibody, zinc transporter 8 antibody, islet antibody, insulin antibody not available on epic search.     Numerous C-peptides:  Component      Latest Ref Rng 8/28/2018  6:47 AM 9/3/2018  6:41 PM 9/6/2018  8:00 AM 9/7/2018  6:55 AM 4/18/2019  11:04 AM 4/3/2025  2:01 PM   C-Peptide      0.9 - 6.9 ng/mL 0.3 (L)  0.2 (L)  1.8  2.8  1.8  0.5 (L)    Patient Fasting?           Yes      PTA Medication Regimen:   She says she was taking what her sheet said that she given on last discharge:  She was discharged on:     Long-acting insulin: glargine (Lantus) - take 4 units once daily at 6pm. Take at same time each day.     2) Rapid-acting insulin: aspart (Novolog) carbohydrate coverage/mealtime insulin - take 1 unit per 14 grams of carbohydrates before meals and snacks.     3) Rapid-acting insulin: aspart (Novolog) correction - see chart below. Take for high blood glucoses three times daily before meals when eating (or every 4-6 hours when receiving tube feeding) and at bedtime.  You may add the correction dose to the carbohydrate coverage/mealtime insulin dose and give in one injection--ideally 10-15 minutes before a meal.  You may take the correction dose even if you skip a meal (as long as it has been 4 hours since previous correction dose).      Blood Glucose Insulin Before Meals When Eating or every 4-6 hours when receiving tube feeding:   Less than 175 0 units   175 -224  1 units   225 - 274 2 units   275 - 324 3 units   325 - 374 4 units    375 - 424  5 units   425 - 474 6 units   475 or more 7 units         4) Intermediate-acting insulin: Novolin N (NPH). Take to cover tube feeds (dosed for Sagrario [place] at 45 ml/hr)   - Take Novolin N (NPH) 4 units at 9AM   - Take Novolin N (NPH) 3 units at 9PM      (If your rate of tube feed were to decrease, you can reference the list below)  If TF rate at 20 ml per hour, give 1 units  If TF rate at 30 ml per hour, give 3  "units  If TF rate from 40-45 ml/hr, give 5 units  Missing doses?: denies  Historical Diabetes Medications: MDI, insulin pumps     When she was in [place] during the winter and was discharged on TF:   - Lantus 18 units AM, 12 units PM (Covering TF)   - Novolog ICR 1:16  - Novolog correction 1:35 (more intuitive dosing        Glucose monitoring device/frequency/trends: Dexcom was put on her on 5/13/2025 by diabetes ed       Her glucose meter(checking 4-6 times daily) running 400 to \"high\" for the past 3 days since she has been sick.       Outpatient Diabetes Provider: Dr Maher  Formal Diabetes Education/Educator: Libby Jay, diabetes ed saw her on 5/13/2025, sara Bean saw her on 4/21/2025        Physical Exam:   /87 (BP Location: Right arm)   Pulse 82   Temp 98.4  F (36.9  C) (Oral)   Resp 16   Wt 47.8 kg (105 lb 6.1 oz)   SpO2 95%   BMI 19.27 kg/m    General:  resting in bed.   HEENT:  NC/AT.   Skin:  warm and dry  MSK:   moving all extremities  Mental Status:  Alert and oriented x3  Psych:   Cooperative, good eye contact, full/appropriate affect           Data:     Lab Results   Component Value Date    A1C 15.8 (H) 04/30/2025    A1C 19.1 (H) 04/15/2025    A1C 18.9 (H) 04/03/2025    A1C 17.1 (H) 01/23/2025    A1C 11.1 (H) 03/02/2023    A1C 7.3 (H) 11/09/2020    A1C 6.7 (A) 11/21/2019    A1C 8.2 (H) 06/11/2019    A1C Canceled, Test credited 06/10/2019    A1C 9.6 (H) 04/18/2019       ROUTINE IP LABS (Last four results)  BMP  Recent Labs   Lab 05/22/25  0843 05/22/25  0553 05/22/25  0138 05/21/25  2324 05/21/25  0829 05/21/25  0613 05/20/25  0901 05/20/25  0559 05/18/25  0832 05/18/25  0611   NA  --  137  --   --   --  133*  --  137  --  135   POTASSIUM  --  4.2  --   --   --  4.0  --  3.9  --  4.1   CHLORIDE  --  103  --   --   --  102  --  104  --  101   ELIZA  --  8.7*  --   --   --  8.7*  --  8.1*  --  8.6*   CO2  --  25  --   --   --  25  --  22  --  25   BUN  --  17.6  --   --   --  14.6  --  " 14.5  --  15.1   CR  --  0.44*  --   --   --  0.47*  --  0.42*  --  0.42*   * 310* 236* 297*   < > 323*   < > 274*   < > 218*    < > = values in this interval not displayed.     CBC  Recent Labs   Lab 05/21/25  0613 05/18/25  0611 05/17/25  0701 05/16/25  0617   WBC 8.8 9.9 8.8 10.8   RBC 3.61* 3.42* 3.61* 3.60*   HGB 11.4* 10.7* 11.4* 11.6*   HCT 33.8* 32.2* 34.0* 33.5*   MCV 94 94 94 93   MCH 31.6 31.3 31.6 32.2   MCHC 33.7 33.2 33.5 34.6   RDW 13.2 13.0 13.1 12.8   * 573* 625* 636*     INRNo lab results found in last 7 days.    Inpatient Diabetes Service will continue to follow, please don't hesitate to contact the team with any questions or concerns.     BEE Felix CNP    Plan discussed with patient, bedside RN, and primary team via this note.    To contact Inpatient Diabetes Service:     7 AM - 5 PM: Page the [email or web address].ly DAVID following the patient that day (see filed or incomplete progress notes/consult notes under Endocrinology)    OR if uncertain of provider assignment: page job code [phone]    5 PM - 7 AM: First call after hours is to primary service.    For urgent after-hours questions, page job code for on call fellow: [phone]     I spent a total of 45 minutes on the date of the encounter doing prep/post-work, chart review, history and exam, documentation and further activities per the note including lab review, multidisciplinary communication, counseling the patient and/or coordinating care regarding acute hyper/hypoglycemic management, as well as discharge management and planning/communication.

## 2025-05-22 NOTE — PLAN OF CARE
Goal Outcome Evaluation:           Overall Patient Progress: no changeOverall Patient Progress: no change            VS: /87 (BP Location: Right arm)   Pulse 82   Temp 98.4  F (36.9  C) (Oral)   Resp 16   Wt 47.8 kg (105 lb 6.1 oz)   SpO2 95%   BMI 19.27 kg/m      O2: SpO2 > 93% and stable on RA. LS clear and equal bilaterally. Denies chest pain. Has SOB with activity; Medicine MD aware    Output: Voids spontaneously without difficulty to bathroom and BSC. Urinary incontinence and urgency   Last BM: 5/20/2025, denies abdominal discomfort. BS active.   Activity: Up with Ax 1 using walker and GB   Skin: WDL except, Bruises to BUE. J and G tube    Pain: Pain managed with PRN meds    CMS: Intact, AOx4. Baseline numbness and tingling to all extremities.    Dressing: CDI.   Diet: Moderate consistence carb diet with continues TF running at 45 ml/hr. Intermittent nausea; controlled with IV Zofran   LDA: No PIV. G tube on intermittent suction with small tan yellow think output. J tube infusing TF.   Equipment: IV pole, personal belongings, Commode.    Plan: Continue with plan of care.: needs weight, do not check BG until meal tray in room. If pt needs PIV, request extended dwell    Additional Info:

## 2025-05-22 NOTE — PROGRESS NOTES
Pt has SOB with activity. Has been occurring for 2 weeks.     Also, pt c/o rib pain.     Medicine MD paged.

## 2025-05-22 NOTE — PROGRESS NOTES
CLINICAL NUTRITION SERVICES - REASSESSMENT NOTE     RECOMMENDATIONS FOR MDs/PROVIDERS TO ORDER:  May benefit from reduction of enzymes if pt constipated. Would recommend 6 capsules of creon 12 per meal.   Please consider liberalizing diet to High Consistent Carb 75 g CHO    Registered Dietitian Interventions:  Magic cup daily and Special K bar daily + PRN  Continue current TF    Future/Additional Recommendations:  Monitor intakes, labs weights. TF tolerance     INFORMATION OBTAINED  Assessed patient in room.. Aide and endocrinologist present during parts of visit. Pt reports she feels she is not able to get enough calories orally with the current carbohydrate restriction.     CURRENT NUTRITION ORDERS  Diet: Moderate Consistent Carbohydrate  Snacks/Supplements: None currently ordered  Nutrition Support: [employer] Peptide 1.5 (or equivalent) @ 45 mL/hr (1080 mL/day) to provide 1661 kcal, 80 g protein, 149 g CHO, 16 g fiber, 83 g fat (40% from MCTs), and 756 mL free water daily   FWF: 200 ml q 4 hr -     CURRENT INTAKE/TOLERANCE  % of documented meals per flowsheets. Poorly documented over last week ***    Pt ordering (on average) 2284 kcal and 86 g protein per day per [employer]. With documented and reported intakes, pt is likely meeting minimum energy and protein needs.     NEW FINDINGS  GI symptoms:Pt reports constipation and intermittent nausea improved by zofran LBM 5/20   Skin/wounds: G/J tube, No pressure injury indicated   Nutrition-relevant labs:    05/17/25 07:01 05/18/25 06:11 05/20/25 05:59 05/21/25 06:13 05/22/25 05:53   Sodium 133 (L) 135 137 133 (L) 137   Potassium 4.3 4.1 3.9 4.0 4.2   Urea Nitrogen 12.7 15.1 14.5 14.6 17.6   Creatinine 0.49 (L) 0.42 (L) 0.42 (L) 0.47 (L) 0.44 (L)   Glucose 306 (H) 218 (H) 274 (H) 323 (H) 310 (H)     Nutrition-relevant medications: Pepcid, hydrocortisone, insulin (novolog, lantus, NPH), levothyroxine, linaclotide, creon 12 8 capsules with meals (2021  units/kg/meal), Reglan, protonix, miralax, potassium chloride, senna, simethicone, Carafate, thiamine, topiramate, Trazadone, zofran PRN, oxycodone PRN    Weight: Pt with limited weight history***, with 5 lb (5%) weight gain in 1 month, *** lb (***%) weight *** in 6*** months. With *** lb (***%) weight *** since admission to [facility]***.   05/18/25 47.8 kg (105 lb 6.1 oz) - bed scale   05/13/25 48.5 kg (107 lb)   05/06/25 46.6 kg (102 lb 11.2 oz)   05/03/25 46.2 kg (101 lb 12.8 oz)    04/29/25 47.2 kg (104 lb)   04/21/25 50.4 kg (111 lb 1.6 oz)   04/16/25 45.7 kg (100 lb 12.8 oz)    04/03/25 42.8 kg (94 lb 6.4 oz)   12/11/24 47.7 kg (105 lb 3.2 oz) -Care everywhere   09/12/24 49.6 kg (109 lb 6.4 oz)-  Care everywhere   09/10/24 49.2 kg (108 lb 8 oz) - Care everywhere   07/01/24 49.8 kg (109 lb 12.8 oz) - Care everywhere   04/04/23 59.2 kg (130 lb 8 oz)   03/02/23 59.6 kg (131 lb 6.4 oz)   02/10/23 59.2 kg (130 lb 8 oz)     ASSESSED NUTRITION NEEDS  Dosing Weight: *** kg - {RDNDosingweightjustification:836174}   Estimated Energy Needs: ***-*** kcals/day ({RDNEstimatedenergyneeds:170650})  Justification: {RDNEstimatedenergyneedsjustification:094058}  Estimated Protein Needs: ***-*** grams protein/day ({RDNEstimatedproteinneeds:716805})  Justification: {RDNEstimatedproteinneedsjustification:101300}{erproteinjustification:090622}  Estimated Fluid Needs: ***-*** mL/day ({RDNEstimatedfluidneeds:364646}) 1 mL/kcal or per provider pending fluid status  Justification: {RDNEstimatedfluidneedsjustification:585416}    MALNUTRITION  % Intake: {RDNMalnutrition%intake:799841}  % Weight Loss: {%Weight loss:711134}  Subcutaneous Fat Loss: {RDNMalnutritionsubcutaneousfatloss:475147}  Muscle Loss: {RDNMalnutritionmuscleloss:302214}  Fluid Accumulation/Edema: Moderate to severe, 2-4+  Malnutrition Diagnosis: {RDNMalnutritiondiagnosis:346832}  Malnutrition Present on Admission: {RDNMalnutritionpresentonadmit:888408}    EVALUATION OF THE  PROGRESS TOWARD GOALS   Previous Goals  Total avg nutritional intake to meet a minimum of 30 kcal/kg and 1.2 g PRO/kg daily (per dosing wt 48.5 kg).  Evaluation: {RDNpreviousgoalevaluation:128265}    Previous Nutrition Diagnosis  Inadequate oral intake related to nausea/vomiting/abdominal pain as evidenced by patient report, pt requiring TF to meet nutrition needs.  Evaluation: {RDNpreviousnutritiondiagnosisevaluation:508426}    NUTRITION DIAGNOSIS  {RDNNutritiondiagnosis:226844} related to *** as evidenced by ***    INTERVENTIONS  {erinterventions:129441}    GOALS  Total avg nutritional intake to meet a minimum of *** kcal/kg and *** g PRO/kg daily (per dosing wt *** kg).     MONITORING/EVALUATION  Progress toward goals will be monitored and evaluated per policy.    Kathleen Goddard MS, RDN, LD  TCU/OB/Ortho Clinical Dietitian  Available via phone and Vocera  Phone: [phone]  Vocera: 5 Ortho Clinical Dietitian  Weekend/Holiday Vocera: Weekend Holiday Clinical Dietitian [Multi Site Groups]

## 2025-05-22 NOTE — PLAN OF CARE
VS: VSS, pt denied CP or SOB.   O2: Room air sat. > 90%.   Output: Voiding adequate amount on BSC.    Last BM: 05/20/25 passing gas.    Activity: Up to BSC with assist.    Skin: Intact except the GJ tube site.    Pain: Manageable with PRN medication.    Neuro: CMS and neuro intact to baseline.    Dressing: GJ tube dressing CDI.    Diet: Consistent CHO diet.    LDA: PIV SL, GJ and J tube.    Equipment: IV pole, commode, walker and personal belongings.    Plan: TBD.    Additional Info: Gastric tube on intermittent suction, J tube feeding infusing continues at 45 ml/h.

## 2025-05-22 NOTE — PROGRESS NOTES
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     5/22/25 - Chantell is seen today, along with GI. Pain is in lower abdomen (L>R) and wraps around to left side low back. The pain is sharp and has spasms in left side flank and low back. Flexeril 5 mg helps to an extent with spasms, no side effects. Despite aggressive bowel regimen, she has not achieve bowel movement over past 2 days, which I suspect is significant contributing factor to abdominal pain. We discussed plan to minimize opioid use as much as possible, as this exacerbates constipation, though it would be reasonable to increase Flexeril dose (see below). GI team will determine changes to bowel regimen.     Plan/Recommendations:  Flexeril - Currently ordered 5 mg TID PRN and 5 mg at bedtime. Recommend change to 10 mg TID PRN.   Oxycodone 5-10mg PO Q 6 hours PRN. Do not recommend dose escalation.                     To taper,   oxycodone 5-10mg PO Q 4 hours PRN x 1 day then decrease to Q 6 hours PRN x 1day then to Q 8 hours PRN x 1 day then to Q 12 hours PRN x 1day then to 1/day PRN x 1 day and STOP.      Minimize IV Dilaudid 0.2-0.4mg IV Q 4 hours PRN x 24 hours.  If no new acute pathology causing pain, would discontinue.   Simethicone as needed  Lidocaine patches 1-3 patches Q 24 hours, 12 hours on and 12 hours off  Menthol patches, 1 patch TD Q 8 hours  Aggressive bowel regimen --- recs per GI team   Keep pain clinic appt. On 6/10/2025 with Dr. Kapadia.    Pain Service will sign off.    Discussed with attending  anesthesiologist- the context of our conversation was medication adjustments, care plan, pain service sign off    KAITY Gates CNP  05/22/2025     Overnight Events: NAEO    Subjective:  The pain is there all day long. I get spasms in my left side and low back. Flexeril helps the spasms.     Pain Location:  Lower abdomen, L>R  Left flank and low back    Pain Intensity:    Satisfied with your level of pain control: No    Diet: Adult Formula Drip Feeding: Continuous [employer] Peptide 1.5; Jejunostomy; Goal Rate: 45 ml/hr. Please see Relizorb orders. Please abide by 8 hr hang time for open systems; mL/hr  Moderate Consistent Carb (60 g CHO per Meal) Diet    Relevant Labs:  Recent Labs   Lab Test 05/22/25  0553 05/21/25  0613 05/02/25  0646 05/01/25  1007 11/10/20  0608 11/09/20  0741   PROTIME  --   --   --  12.7  --   --    INR  --   --   --  0.92   < > 1.05   PLT  --  618*   < > 587*   < >  --    PTT  --   --   --   --   --  25   BUN 17.6 14.6   < > 5.5*   < > 5*    < > = values in this interval not displayed.       Physical Exam:  Vitals: /87 (BP Location: Right arm)   Pulse 82   Temp 98.4  F (36.9  C) (Oral)   Resp 16   Wt 47.8 kg (105 lb 6.1 oz)   SpO2 95%   BMI 19.27 kg/m      Physical Exam:   Orientation:  Alert, oriented, and in no acute distress: Yes  Sedation: No    Motor Examination:  Moves all extremities purposefully       Relevant Medications:  Current Pain Medications:  Medications related to Pain Management (From now, onward)      Start     Dose/Rate Route Frequency Ordered Stop    05/19/25 1600  polyethylene glycol (GoLYTELY) suspension 2,000 mL         2,000 mL Oral or Feeding Tube ONCE PRN 05/19/25 1542      05/17/25 1813  oxyCODONE (ROXICODONE) tablet 5-10 mg         5-10 mg Oral or Feeding Tube EVERY 6 HOURS PRN 05/17/25 1813      05/17/25 1135  bisacodyl (DULCOLAX) suppository 10 mg         10 mg Rectal DAILY PRN 05/17/25 1135      05/16/25 2000  acetaminophen (TYLENOL) tablet  "650 mg        Placed in \"Or\" Linked Group    650 mg Oral or Feeding Tube 3 TIMES DAILY 05/16/25 1443      05/16/25 2000  acetaminophen (TYLENOL) Suppository 650 mg        Placed in \"Or\" Linked Group    650 mg Rectal 3 TIMES DAILY 05/16/25 1443      05/16/25 2000  topiramate (TOPAMAX) tablet 100 mg        Note to Pharmacy: PTA Sig:Take 1 tablet (100 mg) by mouth 2 times daily      100 mg Oral or Feeding Tube 2 TIMES DAILY 05/16/25 1443      05/16/25 2000  senna-docusate (SENOKOT-S/PERICOLACE) 8.6-50 MG per tablet 2 tablet        Placed in \"Or\" Linked Group    2 tablet Oral or Feeding Tube 2 TIMES DAILY 05/16/25 1445 05/16/25 2000  senna-docusate (SENOKOT-S/PERICOLACE) 8.6-50 MG per tablet 2 tablet        Placed in \"Or\" Linked Group    2 tablet Oral or Feeding Tube 2 TIMES DAILY 05/16/25 1445 05/16/25 2000  polyethylene glycol (MIRALAX) Packet 17 g        Note to Pharmacy: PTA Sig:Take 1 packet by mouth 2 times daily.      17 g Oral or Feeding Tube 3 TIMES DAILY 05/16/25 1446 05/16/25 2000  Lidocaine (LIDOCARE) 4 % Patch 1-2 patch         1-2 patch  over 12 Hours Transdermal EVERY 24 HOURS 2000 05/16/25 1855      05/16/25 1443  cyclobenzaprine (FLEXERIL) tablet 5 mg        Note to Pharmacy: PTA Sig:May take 1.5 tablets (7.5 mg) by mouth 3 times daily as needed for muscle spasms. May also take 1 tablet (5 mg) every evening as needed for muscle spasms.     Placed in \"And\" Linked Group    5 mg Oral or Feeding Tube 3 TIMES DAILY PRN 05/16/25 1444 05/16/25 1443  cyclobenzaprine (FLEXERIL) tablet 5 mg        Note to Pharmacy: PTA Sig:May take 1.5 tablets (7.5 mg) by mouth 3 times daily as needed for muscle spasms. May also take 1 tablet (5 mg) every evening as needed for muscle spasms.     Placed in \"And\" Linked Group    5 mg Oral or Feeding Tube AT BEDTIME PRN 05/16/25 1444      05/16/25 0830  simethicone (MYLICON) chewable tablet 80 mg         80 mg Oral 4 TIMES DAILY 05/16/25 0805      05/16/25 0800  " "DULoxetine (CYMBALTA) DR capsule 90 mg        Note to Pharmacy: PTA Sig:Take 1 capsule (30 mg) by mouth daily With 60mg capsule for total dose of 90mg      90 mg Oral DAILY 05/15/25 1827      05/15/25 2000  gabapentin (NEURONTIN) capsule 400 mg        Note to Pharmacy: PTA Sig:Take 1 capsule (400 mg) by mouth 3 times daily.      400 mg Oral 3 TIMES DAILY 05/15/25 1827      05/15/25 1827  lidocaine 1 % 0.1-1 mL         0.1-1 mL Other EVERY 1 HOUR PRN 05/15/25 1831      05/15/25 1827  lidocaine (LMX4) cream          Topical EVERY 1 HOUR PRN 05/15/25 1831              Primary Service Contacted with Recommendations? Yes - Transphorm message sent to Dr. Basilio       40 MINUTES SPENT BY ME on the date of service doing chart review, history, exam, documentation & further activities per the note.      Acute Inpatient Pain Service [facility]  Hours of pain coverage 24/7   Please Page via Transphorm  - Link to Transphorm Here - Search Pain  Page via Amcom- Please Page the Pain Team Via Amcom: \"PAIN MANAGEMENT ACUTE INPATIENT/ [facility]\"             "

## 2025-05-22 NOTE — PROGRESS NOTES
Assumed patient care around 2705-3116.    Patient alert and oriented x4. Able to make needs known, call light within reach. Patient denies SOB, chest pain, and nausea. Rating pain 8/10, managed with flexeril x1. VSS on RA. Patient Ax1 with transfers to the bedside commode. Per report, continent of bowels, last reported BM 5/20. Mixed continence of bladder, voiding without difficulties. Patient up to the bedside commode. Peg tube in place, and intact. Continuous feeding infusing via J-tube at 45 ml/hr with 200 Q4hrs flushes. G-tube open to intermittent suctioning. Patient declined enema, per patient she wait and see if the methylnaltrexone works. Patient resting comfortably in bed.      Continue with plan of care.

## 2025-05-22 NOTE — CONSULTS
"Consult received for Vascular access care.  See LDA for details. For additional needs place \"Nursing to Consult for Vascular Access\" [id number] order in EPIC.  "

## 2025-05-22 NOTE — PLAN OF CARE
Goal Outcome Evaluation:      Plan of Care Reviewed With: patient    Overall Patient Progress: no change        VS: Temp: 97.9  F (36.6  C) Temp src: Oral BP: 100/68 Pulse: 88   Resp: 16 SpO2: 93 % O2 Device: None (Room air)       O2: SpO2 > 93% and stable on RA. LS clear and equal bilaterally. Denies chest pain and SOB.    Output: Voids spontaneously without difficulty to bathroom and BSC.   Last BM: 5/20/2025, denies abdominal discomfort. BS active.   Activity: Up with A* 1 using walker and GB   Skin: WDL except, Bruises to BUE.    Pain: Pain managed with PRN Flexeril and Oxycodone.    CMS: Intact, AOx4. Baseline numbness and tingling to all extremities.    Dressing: J/G drain dressing CDI.   Diet: Moderate consistence carb diet with continues TF running at 45 ml/hr. Intermittent nausea Zofran was given and pt reported feeling better.   BG check at bedtime 297 , BG overnight 236   LDA: R PIV SL. G tube on intermittent suction with small tan yellow think output. J tube infusing TF.   Equipment: IV pole, personal belongings, Commode.    Plan: Continue with plan of care. Call light within reach, pt able to make needs known.    Additional Info: RELiZORB cartridge was changed this shift at 0030, and due to change at 1230.  Pt ate her dinner without letting the RN know to give her the before meal insuline and carb coverage was given late after the pt finished eating her dinner, because the pt was eating slow.

## 2025-05-23 ENCOUNTER — DOCUMENTATION ONLY (OUTPATIENT)
Dept: PHYSICAL THERAPY | Facility: CLINIC | Age: 60
End: 2025-05-23

## 2025-05-23 DIAGNOSIS — R26.89 OTHER ABNORMALITIES OF GAIT AND MOBILITY: Primary | ICD-10-CM

## 2025-05-23 LAB
GLUCOSE BLDC GLUCOMTR-MCNC: 103 MG/DL (ref 70–99)
GLUCOSE BLDC GLUCOMTR-MCNC: 132 MG/DL (ref 70–99)
GLUCOSE BLDC GLUCOMTR-MCNC: 132 MG/DL (ref 70–99)
GLUCOSE BLDC GLUCOMTR-MCNC: 189 MG/DL (ref 70–99)
GLUCOSE BLDC GLUCOMTR-MCNC: 212 MG/DL (ref 70–99)
MITOCHONDRIA M2 IGG SER-ACNC: 2.2 U/ML

## 2025-05-23 PROCEDURE — 250N000013 HC RX MED GY IP 250 OP 250 PS 637: Performed by: INTERNAL MEDICINE

## 2025-05-23 PROCEDURE — 120N000002 HC R&B MED SURG/OB UMMC

## 2025-05-23 PROCEDURE — 250N000011 HC RX IP 250 OP 636: Performed by: INTERNAL MEDICINE

## 2025-05-23 PROCEDURE — 99233 SBSQ HOSP IP/OBS HIGH 50: CPT | Performed by: INTERNAL MEDICINE

## 2025-05-23 PROCEDURE — 99233 SBSQ HOSP IP/OBS HIGH 50: CPT | Performed by: NURSE PRACTITIONER

## 2025-05-23 PROCEDURE — 250N000013 HC RX MED GY IP 250 OP 250 PS 637

## 2025-05-23 PROCEDURE — 250N000013 HC RX MED GY IP 250 OP 250 PS 637: Performed by: STUDENT IN AN ORGANIZED HEALTH CARE EDUCATION/TRAINING PROGRAM

## 2025-05-23 PROCEDURE — 250N000011 HC RX IP 250 OP 636

## 2025-05-23 PROCEDURE — 250N000012 HC RX MED GY IP 250 OP 636 PS 637: Performed by: INTERNAL MEDICINE

## 2025-05-23 PROCEDURE — S9342 HIT ENTERAL PUMP DIEM: HCPCS

## 2025-05-23 RX ORDER — OXYCODONE HYDROCHLORIDE 5 MG/1
5-10 TABLET ORAL EVERY 6 HOURS PRN
Refills: 0 | Status: DISCONTINUED | OUTPATIENT
Start: 2025-05-23 | End: 2025-05-23

## 2025-05-23 RX ORDER — OXYCODONE HYDROCHLORIDE 5 MG/1
5 TABLET ORAL EVERY 6 HOURS PRN
Refills: 0 | Status: DISCONTINUED | OUTPATIENT
Start: 2025-05-23 | End: 2025-05-24 | Stop reason: HOSPADM

## 2025-05-23 RX ADMIN — LEVOTHYROXINE SODIUM 125 MCG: 125 TABLET ORAL at 08:56

## 2025-05-23 RX ADMIN — HYDROCORTISONE 10 MG: 10 TABLET ORAL at 08:55

## 2025-05-23 RX ADMIN — DULOXETINE 90 MG: 60 CAPSULE, DELAYED RELEASE ORAL at 08:56

## 2025-05-23 RX ADMIN — PANTOPRAZOLE SODIUM 40 MG: 40 TABLET, DELAYED RELEASE ORAL at 08:57

## 2025-05-23 RX ADMIN — GABAPENTIN 400 MG: 400 CAPSULE ORAL at 14:08

## 2025-05-23 RX ADMIN — ONDANSETRON 4 MG: 2 INJECTION INTRAMUSCULAR; INTRAVENOUS at 16:18

## 2025-05-23 RX ADMIN — ENOXAPARIN SODIUM 40 MG: 40 INJECTION SUBCUTANEOUS at 12:05

## 2025-05-23 RX ADMIN — POLYETHYLENE GLYCOL 3350 17 G: 17 POWDER, FOR SOLUTION ORAL at 14:09

## 2025-05-23 RX ADMIN — PANCRELIPASE 8 CAPSULE: 60000; 12000; 38000 CAPSULE, DELAYED RELEASE PELLETS ORAL at 18:52

## 2025-05-23 RX ADMIN — OXYCODONE 10 MG: 5 TABLET ORAL at 08:56

## 2025-05-23 RX ADMIN — SUCRALFATE 1 G: 1 TABLET ORAL at 21:58

## 2025-05-23 RX ADMIN — SENNOSIDES AND DOCUSATE SODIUM 2 TABLET: 50; 8.6 TABLET ORAL at 21:53

## 2025-05-23 RX ADMIN — ACETAMINOPHEN 650 MG: 325 TABLET, FILM COATED ORAL at 14:09

## 2025-05-23 RX ADMIN — SUCRALFATE 1 G: 1 TABLET ORAL at 12:05

## 2025-05-23 RX ADMIN — TOPIRAMATE 100 MG: 50 TABLET, FILM COATED ORAL at 21:57

## 2025-05-23 RX ADMIN — PANTOPRAZOLE SODIUM 40 MG: 40 TABLET, DELAYED RELEASE ORAL at 21:58

## 2025-05-23 RX ADMIN — SIMETHICONE 80 MG: 80 TABLET, CHEWABLE ORAL at 12:05

## 2025-05-23 RX ADMIN — METOCLOPRAMIDE 10 MG: 10 TABLET ORAL at 14:09

## 2025-05-23 RX ADMIN — SENNOSIDES AND DOCUSATE SODIUM 2 TABLET: 50; 8.6 TABLET ORAL at 08:55

## 2025-05-23 RX ADMIN — CYCLOBENZAPRINE 10 MG: 10 TABLET, FILM COATED ORAL at 18:52

## 2025-05-23 RX ADMIN — GABAPENTIN 400 MG: 400 CAPSULE ORAL at 08:55

## 2025-05-23 RX ADMIN — HYDROCORTISONE 15 MG: 10 TABLET ORAL at 21:57

## 2025-05-23 RX ADMIN — THIAMINE HCL TAB 100 MG 100 MG: 100 TAB at 08:55

## 2025-05-23 RX ADMIN — SUCRALFATE 1 G: 1 TABLET ORAL at 08:57

## 2025-05-23 RX ADMIN — SIMETHICONE 80 MG: 80 TABLET, CHEWABLE ORAL at 08:57

## 2025-05-23 RX ADMIN — POLYETHYLENE GLYCOL 3350 17 G: 17 POWDER, FOR SOLUTION ORAL at 08:55

## 2025-05-23 RX ADMIN — TRAZODONE HYDROCHLORIDE 200 MG: 100 TABLET ORAL at 21:53

## 2025-05-23 RX ADMIN — OXYCODONE 5 MG: 5 TABLET ORAL at 22:23

## 2025-05-23 RX ADMIN — GABAPENTIN 400 MG: 400 CAPSULE ORAL at 21:58

## 2025-05-23 RX ADMIN — LINACLOTIDE 145 MCG: 145 CAPSULE, GELATIN COATED ORAL at 09:01

## 2025-05-23 RX ADMIN — SUCRALFATE 1 G: 1 TABLET ORAL at 16:18

## 2025-05-23 RX ADMIN — ACETAMINOPHEN 650 MG: 325 TABLET, FILM COATED ORAL at 08:57

## 2025-05-23 RX ADMIN — PANCRELIPASE 8 CAPSULE: 60000; 12000; 38000 CAPSULE, DELAYED RELEASE PELLETS ORAL at 14:07

## 2025-05-23 RX ADMIN — ACETAMINOPHEN 650 MG: 325 TABLET, FILM COATED ORAL at 21:58

## 2025-05-23 RX ADMIN — ONDANSETRON 4 MG: 2 INJECTION INTRAMUSCULAR; INTRAVENOUS at 09:18

## 2025-05-23 RX ADMIN — FAMOTIDINE 20 MG: 20 TABLET, FILM COATED ORAL at 21:57

## 2025-05-23 RX ADMIN — METOCLOPRAMIDE 10 MG: 10 TABLET ORAL at 21:57

## 2025-05-23 RX ADMIN — OXYCODONE 5 MG: 5 TABLET ORAL at 16:18

## 2025-05-23 RX ADMIN — TOPIRAMATE 100 MG: 50 TABLET, FILM COATED ORAL at 08:57

## 2025-05-23 RX ADMIN — METOCLOPRAMIDE 10 MG: 10 TABLET ORAL at 08:55

## 2025-05-23 RX ADMIN — POTASSIUM CHLORIDE 20 MEQ: 1500 TABLET, EXTENDED RELEASE ORAL at 08:57

## 2025-05-23 RX ADMIN — METHYLNALTREXONE BROMIDE 8 MG: 12 INJECTION, SOLUTION SUBCUTANEOUS at 09:03

## 2025-05-23 RX ADMIN — SIMETHICONE 80 MG: 80 TABLET, CHEWABLE ORAL at 16:18

## 2025-05-23 RX ADMIN — SIMETHICONE 80 MG: 80 TABLET, CHEWABLE ORAL at 21:58

## 2025-05-23 RX ADMIN — POLYETHYLENE GLYCOL 3350 17 G: 17 POWDER, FOR SOLUTION ORAL at 21:54

## 2025-05-23 ASSESSMENT — ACTIVITIES OF DAILY LIVING (ADL)
ADLS_ACUITY_SCORE: 62

## 2025-05-23 NOTE — PROGRESS NOTES
VS: /74 (BP Location: Left arm)   Pulse 84   Temp 98.5  F (36.9  C) (Oral)   Resp 16   Wt 47.8 kg (105 lb 6.1 oz)   SpO2 99%   BMI 19.27 kg/m      O2: RA   Output: Voids in BSC   Last BM: 5/22   Activity: SBA, assist X1 with walker   Skin: Intact except for insertion site of G & J tubes   Pain: Complains of constant pain in ABD area   CMS: Numbness/tingling in all 4 extremities   Dressing: none   Diet: Reg diet and tolerates well   LDA: PIV in RFA   Equipment: GB, walker   Plan: Monitoring pain control, discharge possibly tomorrow   Additional Info:

## 2025-05-23 NOTE — PLAN OF CARE
Goal Outcome Evaluation:      Plan of Care Reviewed With: patient    Overall Patient Progress: no changeOverall Patient Progress: no change                PSYCHIATRY PROGRESS NOTE    Shannen Santamaria  1966    Phone call start time: 3:02pm  Phone call end time:  3:34pm      CC:   Chief Complaint   Patient presents with    Follow-up     Per excerpt of initial eval as completed by this provider on 20:    Very depressed all the time. Don't sleep. Have pricilla. Can't wear cpap, bipap. Rips off because can't breathe.     Sit up in recliner. Can't lay flat d/t health issues, asthma and stuff.      Very frustrating. Have a fear of going on medications. Fears going to sleep and won't wake up.     Was on meds years ago.            Neighbor, known him since I was 1 y/o, he had alzheimers. I would go visit him often. His daughter moved him away in December. She's cut off all contact. He was a like a father to me after my dad . She promised me we would stay in contact but i've reached out several times and she won't respond.       In January my two cats . Were like my children since I wasn't able to have kids. Then I had the flu. Had lost 2 friends in January. Couldn't attend the  because was so sick with flu and bronchitis     I don't sleep. Was drinking  A lot of caffeine. Was having cp so backed off caffeine.     Sees endocrinologist for DM, dx 2 years ago     Friend recently got . Would see 3-4 times/week. Now only sees once every few months. Limited social support. Some Gnosticism friends. Has 2 brothers, no contact with one that was abusive. Other brother doesn't live locally. Pt Doesn't drive, has no car     Disability for back injuries, gastroparesis, RA and osteoarthritis. Every morning I get sick from gastroparesis     I get sick easily     Multiple medical issues. Gets egd every 6 months for esophageal stretching. Has gastroparesis and gerd. Causes scarring. Has gradually eliminated many things from what I eat d/t difficulty swallowing     Was never able to have children. ROSALIA baby.   Was twin but mom lost twin hospitalizations: denies              Prior diagnoses:  Bipolar by pcp years ago; anxiety d/o              Outpatient Treatment:                           Psychiatrist:  denies                          Therapist: denies              Suicide Attempts: denies              Hx SH:  Denies      Past Psychopharmacologic Trials (including response/reactions):     1. Lexapro:  Did ok, but built up over time. Stopped because didn't feel needed  2. Xanax:  Years and years ago until they mixed with wellbutrin and had major anger issues  3. Prozac: Major anger issues, felt drugged  4.   Librium:  Too strong or something     - no prior mood stabilizers        Past Medical/Surgical History:   Past Medical History:   Diagnosis Date    Ankle fracture, left     Ankle fracture, right     Arthritis     Asthma     Bipolar depression (HCC)     CAN'T AFFORD MEDS    Bladder problem     Cervical disc herniation     Diabetes mellitus ([address]     diet control    Fatty liver disease, non-alcoholic     Fibromyalgia     Gastroparesis     Dr Ruchi Amaya GERD (gastroesophageal reflux disease)     gastroporesis    Glaucoma     Dr Greg Babin Hyperlipidemia     elevated LFT on meds    IBS (irritable bowel syndrome)     Lumbar herniated disc     Nausea & vomiting     Nephrolithiasis 1/29/2019    Obesity (BMI 30-39.9) 3/11/2019    Partial Achilles tendon tear     Pneumonia     PONV (postoperative nausea and vomiting)     RA (rheumatoid arthritis) ([address]     Rapid or irregular heartbeat     Sleep apnea     does not use cpap    Spinal stenosis     Stress incontinence     Thyroid disease     growths     Past Surgical History:   Procedure Laterality Date    CHOLECYSTECTOMY      COLONOSCOPY      COLONOSCOPY  03/06/2018    with polypectomies    DILATION AND CURETTAGE OF UTERUS      ENDOMETRIAL ABLATION      ENDOSCOPY, COLON, DIAGNOSTIC      ERCP  5/25/2010    with stent    ERCP  1-31-14    ERCP WITH [facility] Family History   Problem Relation Age of Onset    Diabetes Mother     Heart Disease Mother     Stroke Mother     Heart Disease Father     Heart Disease Brother     High Blood Pressure Brother     High Cholesterol Brother     Diabetes Brother     High Cholesterol Brother     Anesth Problems Neg Hx     Malig Hyperten Neg Hx     Hypotension Neg Hx     Malig Hypertherm Neg Hx     Pseudochol. Deficiency Neg Hx          PCP: No primary care provider on file. Allergies:    Allergies   Allergen Reactions    Penicillins Hives and Shortness Of Breath    Shellfish-Derived Products Swelling     Throat and tongue swells, allergy to all seafood    Aspartame And Phenylalanine      Severe headaches    Bactrim Hives    Biaxin [Clarithromycin] Hives    Cephalexin Other (See Comments)     blisters    Hydrocodone-Acetaminophen Other (See Comments)     HALLUCINATIONS    Lansoprazole Hives    Nexium [Esomeprazole Magnesium Trihydrate] Hives    Other Other (See Comments)     TEGADERM-BLISTERS    Prilosec [Omeprazole] Hives    Blueberry [Vaccinium Angustifolium] Nausea And Vomiting     Projectile vomiting    Monosodium Glutamate Nausea And Vomiting    Zmax [Azithromycin Dihydrate] Nausea And Vomiting     NOT Z PACK its the Powder you had to mix and drink         Current Medications:   Current Outpatient Medications on File Prior to Visit   Medication Sig Dispense Refill    vitamin D (MAXIMUM D3) 250 MCG (63595 UT) CAPS capsule Take 1 capsule by mouth daily 30 capsule 0    Multiple Vitamins-Minerals (THERAPEUTIC MULTIVITAMIN-MINERALS) tablet Take 1 tablet by mouth daily      QUEtiapine (SEROQUEL) 25 MG tablet Start one tab nightly x 7 days then increase to 2 tabs nightly 60 tablet 3    MUCINEX D MAX STRENGTH 120-1200 MG TB12 TAKE ONE TABLET BY MOUTH EVERY 12 HOURS AS NEEDED FOR NASAL CONGESTION 28 tablet 0    pitavastatin (LIVALO) 1 MG TABS tablet Take 1 tablet by mouth nightly 30 tablet 5    ibuprofen (ADVIL;MOTRIN) 800 MG tablet Take 1 tablet by mouth every 8 hours as needed for Pain TAKE ONE TABLET BY MOUTH EVERY 6 HOURS AS NEEDED FOR PAIN (Patient not taking: Reported on 4/21/2020) 90 tablet 0    albuterol sulfate  (90 Base) MCG/ACT inhaler INHALE TWO PUFFS BY MOUTH EVERY 6 HOURS AS NEEDED FOR WHEEZING 1 Inhaler 2    loratadine (CLARITIN) 10 MG tablet TAKE ONE TABLET BY MOUTH DAILY 30 tablet 5    cyclobenzaprine (FLEXERIL) 10 MG tablet Take 0.5 tablets by mouth daily as needed for Muscle spasms (Patient not taking: Reported on 4/21/2020) 30 tablet 5    blood glucose test strips (ASCENSIA AUTODISC VI;ONE TOUCH ULTRA TEST VI) strip 2 each by In Vitro route daily As needed. 100 each 5    Blood Glucose Monitoring Suppl (TRUE METRIX METER) w/Device KIT As needed 1 kit 0    insulin glargine (LANTUS SOLOSTAR) 100 UNIT/ML injection pen Inject 35-38 Units into the skin daily 5 pen 3    Cholecalciferol (VITAMIN D3) 25 MCG (1000 UT) TABS Take 1 tablet by mouth daily (Patient not taking: Reported on 2/18/2020) 90 tablet 1    ketotifen (ZADITOR) 0.025 % ophthalmic solution INSTILL TWO DROPS IN [facility] TWO TIMES A DAY AS NEEDED FOR ALLERGY 1 Bottle 3    blood glucose monitor strips Test blood sugar 2-3 times  a day 100 strip 5    TRUEPLUS LANCETS 28G MISC USE ONE LANCET TO TEST THREE TO FOUR TIMES A  each 5    insulin glargine (BASAGLAR KWIKPEN) 100 UNIT/ML injection pen Inject 35 Units into the skin every morning (before breakfast) (Patient not taking: Reported on 2/18/2020) 5 pen 3    blood glucose test strips (ASCENSIA AUTODISC VI;ONE TOUCH ULTRA TEST VI) strip 1-2 time a day As needed.  100 each 3    aspirin 81 MG chewable tablet Take 1 tablet by mouth daily (coated tablets) 30 tablet 3    Insulin Pen Needle (PEN NEEDLES) 32G X 4 MM MISC USE WITH INSULIN PEN ONCE DAILY 100 each 5    nystatin (MYCOSTATIN) 103747 UNIT/GM cream APPLY EXTERNALLY TO THE AFFECTED AREAS TWO TIMES A DAY 1 dysuria. Mental Status Exam:      Appearance    unable to assess d/t f/u visit completed via pc  Muscle strength/tone: unable to assess d/t f/u visit completed via pc  Gait/station: unable to assess d/t f/u visit completed via pc    Speech    spontaneous, normal rate and normal volume  Mood    Anxious  Depressed  Affect  Unable to fully assess d/t f/u visit completed via telehealth (pc) though sounds congruent to thought content and mood  Thought Content    hopelessness, helplessness and excessive preoccupations, no delusions voiced  Thought Process   perseverative   Associations    logical connections  Perceptions: [address],   [address]  Orientation    oriented to person, place, time, and general circumstances  Memory    recent and remote memory intact  Attention/Concentration    intact  Ability to understand instructions Yes  Ability to respond meaningfully Yes  Language: [address] of knowledge/Intellect: Average  SI:   no suicidal ideation  HI: [place]       Labs:     Admission on 03/13/2020, Discharged on 03/13/2020   Component Date Value    POC Glucose 03/13/2020 194*    Performed on 03/13/2020 ACCU-CHEK     POC Glucose 03/13/2020 159*    Performed on 03/13/2020 ACCU-CHEK    Orders Only on 11/26/2019   Component Date Value    Cholesterol, Total 11/26/2019 254*    Triglycerides 11/26/2019 145     HDL 11/26/2019 47     LDL Calculated 11/26/2019 178*    VLDL Cholesterol Calcula* 11/26/2019 29     TSH 11/26/2019 1.03        Last Drug screen:  No results found for: LABAMPH, LABBARB, LABBENZ, COCAIMETSCRU, THC, MDMA, LABMETH, OPIATESCREENURINE, OXTCOSU, PHENCYCLIDINESCREENURINE, PROPOXYPHENE, METAMPU      Imaging:   Ct head wo contrast 5/15/2010:     IMPRESSION- No acute intracranial abnormality. Consideration   should be given to MRI followup.              ASSESSMENT AND PLAN     Diagnosis Orders   1. Bipolar depression ([address]     2. Anxiety          1.  Safety: NO office and unable to get muscle relaxer refilled until establishes with new pcp. Prefers low dose d/t fear of oversedation.      -Labs: reviewed in Epic              11/26/19:  Lipids:  (Total chol 254, ldl 178); hgba1c 8.2 tsh wnl              5/7/19:  Vit d 21.4     - ENCOURAGED TO KEEP CONSISTENT SCHEDULE/ROUTINE TO INCLUDE WAKE/SLEEP TIME, MEAL TIMES, SHOWER TIMES, EXERCISE       -Recommend outpt therapy. Has tried to connect in past but limited with insurance options. Unsure if wants to proceed or would be helpful     -OARRS reviewed, c/w history  -R/b/se/a d/w pt who consents.     3. Medical  -no pcp at present. Attempting to establish at [facility] office     4. Substance   -No active issues.     5.  RTC - 2 -4 weeks      Mariella Angel  Psychiatric Nurse Practitioner

## 2025-05-23 NOTE — PROGRESS NOTES
Inpatient Diabetes Management Service: Daily Progress Note     HPI: Chantell Kidd is a 59 year old female admitted on 5/15/2025. 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 Inpatient Diabetes Service consulted for hyperglycemia and then hypoglycemia.             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 Poor control  (A1c 15.8 %  4/30/2025, Hgb: 10.5)  2. Enteral Feed/Steroid induced  Hyperglycemia  3. Abdominal pain    Plan/Recommendations:   - Lantus 10 units q 24 hrs at 0930  - NPH insulin: increase to 10 units from 8 units daily at 0930 for tube feeding  - NPH insulin: take 10 units daily at 2100 for tube feeding  - Novolog Meal Coverage: 1 unit per 12 grams CHO, TID AC and 1:15g CHO PRN with snacks/supplements  - Novolog Correction Scale: medium resistance (ISF: 50) TID AC / HS / 0200  - BG Monitoring: TID AC, HS, 0200  - prn D10 infusing at 50 ml/hr if TF stopped or held and NPH given in the past 12 hours  - Hypoglycemia protocol  - Carb counting protocol   - endocrine will sign off, see discharge recs below.    Discussion:   She may discharge tomorrow.  Doing well.   BG higher during daytime yesterday.  Increased NPH with TF today.  She ate Bfast (omelette and muffin) and no novolog carb coverage given this AM resulting in  pre lunch.       Recommendations for Discharge:   Instructions to patient were posted in AVS and discussed on day of discharge.   Medications and supplies are to be ordered by primary service on discharge.   *please use the DIAB non-branded discharge supply order set  ([id number])*     -blood glucose monitoring three times daily before meals and at bedtime, resume Dexcom sensor use.  -lantus insulin 10 units daily in the morning  - NPH insulin 10 units around 9 am for tube feeding and 10 units around 9pm for tube feeding. If tube feeding stopped or not infusing then do not give this insulin  -novolog insulin: 1 unit for every 12 grams of carbs with meals and snacks plus correction insulin per scale below:   Meal time sliding scale insulin:  If -199 give 1 unit  If -249 give 2 units  If -299 give 3 units  If -349 give 4 units  If BG greater than 350 give 5 units    Bedtime sliding scale insulin:   If -249 give 1 unit  If -299 give 2 units  If -349 give 3 units  If BG greater than 350 give 4 units       -follow up with Dr. Maher in 2-3 weeks after discharge, request sent on 5/23/25       Please notify Inpatient Diabetes Service if changes are planned to steroids, nutrition, TPN/TF and anticipated procedures requiring prolonged NPO status.         Interval History/Review of Systems :   The last 24 hours progress and nursing notes reviewed.  Still having abdominal pain, but stooling now.  Eating well.  Denies nausea. May discharge tomorrow.    Planned Procedures/Surgeries: none    Inpatient Glucose Control:     BG higher during daytime yesterday.  Increased NPH with TF today.  She ate Bfast (omelette and muffin) and no novolog carb coverage given this AM resulting in  pre lunch.       Recent Labs   Lab 05/23/25  0141 05/22/25  2136 05/22/25  1928 05/22/25  1532 05/22/25  0843 05/22/25  0553   * 294* 162* 265* 264* 310*             Medications Impacting Glycemia:   Steroids:  hydrocortisone 10 mg in AM and 15 mg in PM   D5W-containing solutions/medications: none   Other medications impacting glucose: none         Nutrition:   Orders Placed This Encounter      Moderate Consistent Carb (60 g CHO per Meal) Diet    Supplements: none    TF: Sagrario Marinelli Peptide 1.5 at 45 ml/hour continuously providing 149g carbs  TPN: none         Diabetes History: see full consult note for complete diabetes history   Diabetes Type and Duration: Pancreatogenic diabetes s/p total pancreatectomy and islet autotransplant with insulin dependence since 1/2012  GAD65 antibody, zinc transporter 8 antibody, islet antibody, insulin antibody not available on epic search.     Numerous C-peptides:  Component      Latest Ref Rng 8/28/2018  6:47 AM 9/3/2018  6:41 PM 9/6/2018  8:00 AM 9/7/2018  6:55 AM 4/18/2019  11:04 AM 4/3/2025  2:01 PM   C-Peptide      0.9 - 6.9 ng/mL 0.3 (L)  0.2 (L)  1.8  2.8  1.8  0.5 (L)    Patient Fasting?           Yes      PTA Medication Regimen:   She says she was taking what her sheet said that she given on last discharge:  She was discharged on:     Long-acting insulin: glargine (Lantus) - take 4 units once daily at 6pm. Take at same time each day.     2) Rapid-acting insulin: aspart (Novolog) carbohydrate coverage/mealtime insulin - take 1 unit per 14 grams of carbohydrates before meals and snacks.     3) Rapid-acting insulin: aspart (Novolog) correction - see chart below. Take for high blood glucoses three times daily before meals when eating (or every 4-6 hours when receiving tube feeding) and at bedtime.  You may add the correction dose to the carbohydrate coverage/mealtime insulin dose and give in one injection--ideally 10-15 minutes before a meal.  You may take the correction dose even if you skip a meal (as long as it has been 4 hours since previous correction dose).      Blood Glucose Insulin Before Meals When Eating or every 4-6 hours when receiving tube feeding:   Less than 175 0 units   175 -224  1 units   225 - 274 2 units   275 - 324 3 units   325 - 374 4 units    375 - 424  5 units   425 - 474 6 units   475 or more 7 units         4) Intermediate-acting insulin: Novolin N (NPH). Take to cover tube feeds (dosed for Sagrario Marinelli at 45  "ml/hr)   - Take Novolin N (NPH) 4 units at 9AM   - Take Novolin N (NPH) 3 units at 9PM      (If your rate of tube feed were to decrease, you can reference the list below)  If TF rate at 20 ml per hour, give 1 units  If TF rate at 30 ml per hour, give 3 units  If TF rate from 40-45 ml/hr, give 5 units  Missing doses?: denies  Historical Diabetes Medications: MDI, insulin pumps     When she was in [place] during the winter and was discharged on TF:   - Lantus 18 units AM, 12 units PM (Covering TF)   - Novolog ICR 1:16  - Novolog correction 1:35 (more intuitive dosing        Glucose monitoring device/frequency/trends: Dexcom was put on her on 5/13/2025 by diabetes ed       Her glucose meter(checking 4-6 times daily) running 400 to \"high\" for the past 3 days since she has been sick.       Outpatient Diabetes Provider: Dr Maher  Formal Diabetes Education/Educator: Libby Jay, diabetes ed saw her on 5/13/2025, sara Bean saw her on 4/21/2025        Physical Exam:   BP 93/62 (BP Location: Right arm)   Pulse 89   Temp 97.6  F (36.4  C) (Oral)   Resp 17   Wt 47.8 kg (105 lb 6.1 oz)   SpO2 96%   BMI 19.27 kg/m    General:  well appearing, in no acute distress.  HEENT:  NC/AT. MMM, sclera not injected  Lungs:  unremarkable, no new cough, no SOB  Skin:  warm and dry, no obvious lesions  MSK:   moving all extremities  Mental Status:  Alert and oriented x3  Psych:   Cooperative, good eye contact, full/appropriate affect           Data:     Lab Results   Component Value Date    A1C 15.8 (H) 04/30/2025    A1C 19.1 (H) 04/15/2025    A1C 18.9 (H) 04/03/2025    A1C 17.1 (H) 01/23/2025    A1C 11.1 (H) 03/02/2023    A1C 7.3 (H) 11/09/2020    A1C 6.7 (A) 11/21/2019    A1C 8.2 (H) 06/11/2019    A1C Canceled, Test credited 06/10/2019    A1C 9.6 (H) 04/18/2019       ROUTINE IP LABS (Last four results)  BMP  Recent Labs   Lab 05/23/25  0141 05/22/25  2136 05/22/25  1928 05/22/25  1532 05/22/25  0843 05/22/25  0553 " 05/21/25  0829 05/21/25  0613 05/20/25  0901 05/20/25  0559 05/18/25  0832 05/18/25  0611   NA  --   --   --   --   --  137  --  133*  --  137  --  135   POTASSIUM  --   --   --   --   --  4.2  --  4.0  --  3.9  --  4.1   CHLORIDE  --   --   --   --   --  103  --  102  --  104  --  101   ELIZA  --   --   --   --   --  8.7*  --  8.7*  --  8.1*  --  8.6*   CO2  --   --   --   --   --  25  --  25  --  22  --  25   BUN  --   --   --   --   --  17.6  --  14.6  --  14.5  --  15.1   CR  --   --   --   --   --  0.44*  --  0.47*  --  0.42*  --  0.42*   * 294* 162* 265*   < > 310*   < > 323*   < > 274*   < > 218*    < > = values in this interval not displayed.     CBC  Recent Labs   Lab 05/21/25  0613 05/18/25  0611 05/17/25  0701   WBC 8.8 9.9 8.8   RBC 3.61* 3.42* 3.61*   HGB 11.4* 10.7* 11.4*   HCT 33.8* 32.2* 34.0*   MCV 94 94 94   MCH 31.6 31.3 31.6   MCHC 33.7 33.2 33.5   RDW 13.2 13.0 13.1   * 573* 625*     INRNo lab results found in last 7 days.    Inpatient Diabetes Service will continue to follow, please don't hesitate to contact the team with any questions or concerns.     BEE Montoay CNP    Plan discussed with patient and primary team messaged via [facility].    To contact Inpatient Diabetes Service:     7 AM - 5 PM: Page the [employer] DAVID following the patient that day (see filed or incomplete progress notes/consult notes under Endocrinology)    OR if uncertain of provider assignment: page job code [phone]    5 PM - 7 AM: First call after hours is to primary service.    For urgent after-hours questions, page job code for on call fellow: [phone]     I spent a total of 50 minutes on the date of the encounter doing prep/post-work, chart review, history and exam, documentation and further activities per the note including lab review, multidisciplinary communication, counseling the patient and/or coordinating care regarding acute hyper/hypoglycemic management, as well as discharge management and  planning/communication.

## 2025-05-23 NOTE — PROGRESS NOTES
[facility]    Medicine Progress Note - Hospitalist Service, GOLD TEAM 18    Date of Admission:  5/15/2025    Assessment & Plan     Chantell Kidd is a 59 year old female admitted on 5/15/2025. She has PMH of insulin-dependent diabetes mellitus after pancreatectomy, chronic pancreatitis, migraine, hypothyroidism, and venessa-en-Y with G tube nutrition who presented due to abdominal and back pain, will be admitted to medicine team for management.     5/23   - CTA negative for pulmonary embolism    - UA as has urinary incontinence   - appreciate GI and pain services input  - patient  is extremely reluctant to decrease oxycodone , state q 8 is just way too long, did reduce to 5 mg ( form 5-10)  and q 6 PRN    - we did have long discussion about opioid pain medication and constipation, we did discuss that  oxycodone causes constipation thus more pain and the cycle continue. She telsl me she had been to pain clinic but in different state and was getting methadone and oxycodone. She Is establishing with pain clinic       GI and pain services rec to minimize narcotics and needs aggressive bowel regime as major contribute to her abdominal pain is constipation and I did discussed with  patient      Exertional shortness of breath   Chest pain  on deep inspiration  - chest CTA negative for pulmonary embolism   - subcutaneous lovenox started for DVT ppx       Urinary incontinence  - over past few days stats when gets up just looses bladder control  -  UA not pointing to infection, suspect symptoms are due to  constipation      Acute on chronic abdominal pain   Transaminitis  Hx Venessa-en-Y (2012) s/p  G-tube and J tube placement for nutrition  S/p cholecystectomy (2004)  Severe constipation   Idiopathic chronic pancreatitis status post  TPIAT in 2012      Patient has had multiple recent admissions due to abdominal pain and issues with PEG tube. Admitted 4/16-2/24 as well as 4/30-5/6 with  "PEG-J repositioned/replaced by GI on 4/17 as well as 5/2. She presented again 5/15 due to acute worsening of abdominal pain, worse with TF though she has been able to continue them.   -PTA had  been using Tylenol 1000mg q6 for the last month  -CT AP w/o on admit with no acute abdominal findings, chronic parenchymal liver disease. central pneumobilia, stable compared to CT 5/15/2025  - on CT chest 5/22 \"  Layering hyperdense material within the gastric lumen is strongly  favored to represent ingested contents.\"  - GJ in place. G tube has been hooked up to intermittent suction   -elevated LFTs with --->30 , ALT 89--->37 , Alk phos 152--->11. Tbili 0.3--->0.2   - GI and pain services rec to minimize narcotics and needs aggressive bowel regime as major contribute to her abdominal pain is constipation   - 5/21 still with ongoing abdominal pain ,hooked up G tube to intermittent suction but espinoza snot seem to be helping    -LFT back to normal   - Per GI 5/22:- Consider Motegrity 2 mg once daily in addition to Linzess (or can go up to Linzess 290 mcg daily). Would hold off at this time given close to discharge but can be done outpatient.   - Would recommend a dose of Methylnaltrexone 12 mg subcutaneous (can be done once daily).  - Consider glycerin suppositories, mineral oil enemas.   - Reglan 10 mg TID for gastroparesis (although more helpful for nausea rather than pain).     Per pain services 5/22:  .     -  Pain plan:   Flexeril - Currently ordered 5 mg TID PRN and 5 mg at bedtime. Recommend change to 10 mg TID PRN.   Oxycodone 5-10mg PO Q 6 hours PRN. Do not recommend dose escalation.                     To taper,   oxycodone 5-10mg PO Q 4 hours PRN x 1 day then decrease to Q 6 hours PRN x 1day then to Q 8 hours PRN x 1 day then to Q 12 hours PRN x 1day then to 1/day PRN x 1 day and STOP.      Minimize IV Dilaudid 0.2-0.4mg IV Q 4 hours PRN x 24 hours.  If no new acute pathology causing pain, would discontinue. " "  Simethicone as needed  Lidocaine patches 1-3 patches Q 24 hours, 12 hours on and 12 hours off  Menthol patches, 1 patch TD Q 8 hours  Aggressive bowel regimen --- recs per GI team   Keep pain clinic appt. On 6/10/2025 with Dr. Kapadia.     - Continue PTA Creon 96,000U TID   -- Continue PTA famotidine, protonix, Linzess, reglan and sucralfate  -- Zofran PRN for nausea   -- MiraLAX 3 times daily, senna Colace twice daily.  Dulcolax and tapwater enema as needed.    - discussed with  patient  the importance of bowel regime ,   -   per GI  \" Work-up so far with negative hepatitis panel, F-actin levels, A1AT, PETh, ceruloplasmin, IgG, unremarkable iron studies. Most likely overall related to MAFLD with some labs still pending. Transaminitis overall improving.   Can follow-up outpatient with PCP for possible fibroscan to assess for fibrosis. No further work-up required at this time. \"   - A1At 123,   -Tissues transglutaminase IgA 1, IgG < 0.6   -IgG 729   - PETH ceruloplasmin  29 < 10   - ferritin 38   -   - ERLINDA negative   -F-actin ( smooth muscle ) 2   - viral hepatitis juts ordered so pending    - mitochondrial M2 An IgG  negative    - liver US \"1.  Coarse hepatic echotexture and increased echogenicity suggestive  of steatosis and chronic parenchymal liver disease.    2.  Findings compatible with central pneumobilia, stable compared to  CT 5/15/2025.  \"       Follow-up in GI clinic 06/18/2025         C/f recent ANGELINA, improved  Patient had labs with PCP during hospital follow up appointment on 5/13 during which her Cr was 3.05, up from her baseline of ~0.4. She presented on 5/15 to the ED with abdominal pain that radiates to the back. Also complaining of increasing urinary frequency and mild dysuria. Cr on admit back to baseline of 0.41. UA negative for signs of infection, though with significant glucosuria which may be contributing to symptoms. Question outpatient lab accuracy.  - creatinine now remains ~ 0.4    "   Post-pancreatectomy diabetes (Type 1)  Chronic Pancreatitis and Sphincter of Oddi dysfunction s/p TPAIT (1/2012)  Patient has poorly controlled diabetes with multiple presentations with glucose >700. Today, presented with BG of 571. Most recent HbA1c of 15.8 4/30/25. No anion gap, Ketones <0.18. In the ED, she was given 1L IVF and glucose improved to 354. Has previously been seen by inpatient endocrinology team with concern for patient not using insulin at home, though she reports taking her insulin as directed prior to admission. Most recent OP Endo visit was on 5/13. Home regimen includes Lantus 18U qAM and at bedtime, NPH 8U qAM, and Novolog sliding scale, and ICR 1U:12CHO with meals. Most readings at home are significantly elevated and this has been a target of outpatient management.   -- Endocrinology consulted, appreciate recommendations  -- Diabetes regimen per endocrine.  -- Continue PTA Creon 96,000U TID with meals          BLE Edema  Has been ongoing for several months, she notes being started on Lasix 20mg daily and this was increased to 40mg BID by provider in [place] in the fall. She had BLE US during most recent admission 4/30/25 that was negative for DVT and confirmed soft tissue edema bilaterally. Over the last couple weeks, left leg is slightly more swollen than the right. No history of injury, fracture, or surgery on this leg before. No erythema or wounds.  Better   -- holding PTA Lasix 40mg BID  -- GAIL hose during the day     Malnutrition   Cachexia   GJ in place with continuous TF at 45ml/hour. Reports she has been able to continue at this rate despite abdominal discomfort.   -- Nutrition consulted, restart tube feeding                   History of adrenal insufficiency  Followed by Dr. Maher. Previously suppressed AM cortisol and has been on empiric therapy for possible adrenal insufficiency, unclear if central vs transient. Had previously been unable to wean steroids due to hypoglycemia  episodes. Most recently saw Dr Maher 4/3/25 and goal is to retest in the near future with low dose ACTH stimulation testing.  -- Continue PTA hydrocortisone 10mg in the AM and 15mg in the PM   -- monitor.      Hypothyroidism  -- Continue PTA levothyroxine      MDD, Anxiety  Insomnia  -- Continue PTA duloxetine, trazodone, topiramate     Mild Hypokalemia: replaced        Diet: Adult Formula Drip Feeding: Continuous [place] [employer] Peptide 1.5; Jejunostomy; Goal Rate: 45 ml/hr. Please see Relizorb orders. Please abide by 8 hr hang time for open systems; mL/hr  Moderate Consistent Carb (60 g CHO per Meal) Diet    DVT Prophylaxis: Ambulate every shift  [place] Catheter: Not present  Lines: None     Cardiac Monitoring: None  Code Status: Full Code      Clinically Significant Risk Factors               # Hypoalbuminemia: Lowest albumin = 3.3 g/dL at 5/18/2025  6:11 AM, will monitor as appropriate                 # Severe Malnutrition: based on nutrition assessment and treatment provided per dietitian's recommendations.    # Financial/Environmental Concerns: none         Social Drivers of Health    Food Insecurity: Low Risk  (5/15/2025)    Food Insecurity     Within the past 12 months, did you worry that your food would run out before you got money to buy more?: No     Within the past 12 months, did the food you bought just not last and you didn t have money to get more?: No   Recent Concern: Food Insecurity - High Risk (4/16/2025)    Food Insecurity     Within the past 12 months, did you worry that your food would run out before you got money to buy more?: Yes     Within the past 12 months, did the food you bought just not last and you didn t have money to get more?: Yes   Depression: At risk (5/13/2025)    PHQ-2     PHQ-2 Score: 5          Disposition Plan     Medically Ready for Discharge: Anticipated Tomorrow              Rosy Basilio MD  Hospitalist Service, GOLD TEAM 18  [facility]  [facility]  Securely message with [employer]fox (more info)  Text page via [employer] Paging/Directory   See signed in provider for up to date coverage information  ______________________________________________________________________    Interval History   Still with abdominal pain, now stooling , also taking in po , if doing ok will discharge  tomorrow  .     Physical Exam   Vital Signs: Temp: 97.7  F (36.5  C) Temp src: Oral BP: 114/72 Pulse: 86   Resp: 16 SpO2: 98 % O2 Device: None (Room air)    Weight: 105 lbs 6.08 oz  General appearence: awake alert  in  no apparent distress    NECK : supple  RESPIRATORY: lungs clear   CARDIOVASCULAR:S1 S2 regular rate and rhythm,   GASTROINTESTINAL: G J tube site looks good , + bowel sounds , defuse abdominal tenderness no rebound or guarding , decreased but present bowel sounds   SKIN: warm and dry, no mottling noted   NEUROLOGIC; awake alert and oriented  EXTREMITIES: no clubbing, cyanosis or edema , moves all extremity,     Data         Imaging results reviewed over the past 24 hrs:

## 2025-05-23 NOTE — DISCHARGE INSTRUCTIONS
IP Diabetes Management Team Discharge Instructions    Glucose Control Regimen:   -blood glucose monitoring three times daily before meals and at bedtime, resume Dexcom sensor use.  -lantus insulin 10 units daily in the morning  - NPH insulin 10 units around 9 am for tube feeding and 10 units around 9pm for tube feeding. If tube feeding stopped or not infusing then do not give this insulin  -novolog insulin: 1 unit for every 12 grams of carbs with meals and snacks plus correction insulin per scale below:   Meal time sliding scale insulin:  If -199 give 1 unit  If -249 give 2 units  If -299 give 3 units  If -349 give 4 units  If BG greater than 350 give 5 units    Bedtime sliding scale insulin:   If -249 give 1 unit  If -299 give 2 units  If -349 give 3 units  If BG greater than 350 give 4 units    Blood Glucose Checks: three times daily before meals, and at bedtime using dexcom sensors.    Endocrinology Outpatient follow up: An appointment request was sent to the [facility] coordinator to schedule your outpatient diabetes appointment 2-3 weeks from discharge. Please call the clinic at [phone] if you do not have an appointment scheduled on discharge, or if you have non-urgent questions regarding your blood sugars or insulin.     If you have urgent questions or concerns regarding your blood sugars or insulin, you may contact [phone] (the main hospital ). Ask to speak with the endocrinologist on call.    Your target A1c value is less than 7%. Your most recent A1c is 15.8.     Thank you for letting the Diabetes Management Team be involved in your care!

## 2025-05-24 ENCOUNTER — HOME INFUSION BILLING (OUTPATIENT)
Dept: HOME HEALTH SERVICES | Facility: HOME HEALTH | Age: 60
End: 2025-05-24
Payer: MEDICARE

## 2025-05-24 ENCOUNTER — HOME INFUSION (OUTPATIENT)
Dept: HOME HEALTH SERVICES | Facility: HOME HEALTH | Age: 60
End: 2025-05-24
Payer: MEDICARE

## 2025-05-24 VITALS
RESPIRATION RATE: 17 BRPM | HEART RATE: 85 BPM | OXYGEN SATURATION: 98 % | WEIGHT: 105.38 LBS | SYSTOLIC BLOOD PRESSURE: 121 MMHG | DIASTOLIC BLOOD PRESSURE: 69 MMHG | BODY MASS INDEX: 19.27 KG/M2 | TEMPERATURE: 98.5 F

## 2025-05-24 LAB
ALBUMIN SERPL BCG-MCNC: 3.5 G/DL (ref 3.5–5.2)
ALP SERPL-CCNC: 104 U/L (ref 40–150)
ALT SERPL W P-5'-P-CCNC: 30 U/L (ref 0–50)
ANION GAP SERPL CALCULATED.3IONS-SCNC: 10 MMOL/L (ref 7–15)
AST SERPL W P-5'-P-CCNC: 23 U/L (ref 0–45)
BILIRUB SERPL-MCNC: 0.2 MG/DL
BUN SERPL-MCNC: 19.1 MG/DL (ref 8–23)
CALCIUM SERPL-MCNC: 8.8 MG/DL (ref 8.8–10.4)
CHLORIDE SERPL-SCNC: 103 MMOL/L (ref 98–107)
CREAT SERPL-MCNC: 0.45 MG/DL (ref 0.51–0.95)
EGFRCR SERPLBLD CKD-EPI 2021: >90 ML/MIN/1.73M2
ERYTHROCYTE [DISTWIDTH] IN BLOOD BY AUTOMATED COUNT: 13 % (ref 10–15)
GLUCOSE BLDC GLUCOMTR-MCNC: 143 MG/DL (ref 70–99)
GLUCOSE BLDC GLUCOMTR-MCNC: 228 MG/DL (ref 70–99)
GLUCOSE BLDC GLUCOMTR-MCNC: 317 MG/DL (ref 70–99)
GLUCOSE BLDC GLUCOMTR-MCNC: 381 MG/DL (ref 70–99)
GLUCOSE SERPL-MCNC: 230 MG/DL (ref 70–99)
HCO3 SERPL-SCNC: 24 MMOL/L (ref 22–29)
HCT VFR BLD AUTO: 34.2 % (ref 35–47)
HGB BLD-MCNC: 11.5 G/DL (ref 11.7–15.7)
MCH RBC QN AUTO: 31.5 PG (ref 26.5–33)
MCHC RBC AUTO-ENTMCNC: 33.6 G/DL (ref 31.5–36.5)
MCV RBC AUTO: 94 FL (ref 78–100)
PLATELET # BLD AUTO: 587 10E3/UL (ref 150–450)
POTASSIUM SERPL-SCNC: 4.2 MMOL/L (ref 3.4–5.3)
PROT SERPL-MCNC: 6.7 G/DL (ref 6.4–8.3)
RBC # BLD AUTO: 3.65 10E6/UL (ref 3.8–5.2)
SODIUM SERPL-SCNC: 137 MMOL/L (ref 135–145)
WBC # BLD AUTO: 7.7 10E3/UL (ref 4–11)

## 2025-05-24 PROCEDURE — 85027 COMPLETE CBC AUTOMATED: CPT | Performed by: INTERNAL MEDICINE

## 2025-05-24 PROCEDURE — 250N000011 HC RX IP 250 OP 636: Performed by: INTERNAL MEDICINE

## 2025-05-24 PROCEDURE — S9342 HIT ENTERAL PUMP DIEM: HCPCS

## 2025-05-24 PROCEDURE — 250N000013 HC RX MED GY IP 250 OP 250 PS 637

## 2025-05-24 PROCEDURE — 82435 ASSAY OF BLOOD CHLORIDE: CPT | Performed by: INTERNAL MEDICINE

## 2025-05-24 PROCEDURE — 250N000012 HC RX MED GY IP 250 OP 636 PS 637: Performed by: INTERNAL MEDICINE

## 2025-05-24 PROCEDURE — 250N000013 HC RX MED GY IP 250 OP 250 PS 637: Performed by: INTERNAL MEDICINE

## 2025-05-24 PROCEDURE — 36415 COLL VENOUS BLD VENIPUNCTURE: CPT | Performed by: INTERNAL MEDICINE

## 2025-05-24 PROCEDURE — 250N000011 HC RX IP 250 OP 636

## 2025-05-24 PROCEDURE — 250N000013 HC RX MED GY IP 250 OP 250 PS 637: Performed by: STUDENT IN AN ORGANIZED HEALTH CARE EDUCATION/TRAINING PROGRAM

## 2025-05-24 PROCEDURE — 99239 HOSP IP/OBS DSCHRG MGMT >30: CPT | Performed by: INTERNAL MEDICINE

## 2025-05-24 RX ORDER — CYCLOBENZAPRINE HCL 10 MG
10 TABLET ORAL 3 TIMES DAILY PRN
Qty: 60 TABLET | Refills: 0 | Status: SHIPPED | OUTPATIENT
Start: 2025-05-24

## 2025-05-24 RX ORDER — AMOXICILLIN 250 MG
2 CAPSULE ORAL 2 TIMES DAILY
Qty: 160 TABLET | Refills: 0 | Status: SHIPPED | OUTPATIENT
Start: 2025-05-24

## 2025-05-24 RX ORDER — POLYETHYLENE GLYCOL 3350 17 G/17G
17 POWDER, FOR SOLUTION ORAL 3 TIMES DAILY
Qty: 510 G | Refills: 0 | Status: SHIPPED | OUTPATIENT
Start: 2025-05-24

## 2025-05-24 RX ORDER — SIMETHICONE 80 MG
80 TABLET,CHEWABLE ORAL 4 TIMES DAILY
Qty: 160 TABLET | Refills: 0 | Status: SHIPPED | OUTPATIENT
Start: 2025-05-24

## 2025-05-24 RX ORDER — TOPIRAMATE 100 MG/1
100 TABLET, FILM COATED ORAL 2 TIMES DAILY
Qty: 50 TABLET | Refills: 0 | Status: SHIPPED | OUTPATIENT
Start: 2025-05-24

## 2025-05-24 RX ORDER — TRAZODONE HYDROCHLORIDE 100 MG/1
TABLET ORAL
Qty: 60 TABLET | Refills: 0 | Status: SHIPPED | OUTPATIENT
Start: 2025-05-24

## 2025-05-24 RX ORDER — LIDOCAINE 4 G/G
1-2 PATCH TOPICAL EVERY 24 HOURS
Qty: 20 PATCH | Refills: 0 | Status: SHIPPED | OUTPATIENT
Start: 2025-05-24

## 2025-05-24 RX ORDER — ONDANSETRON 4 MG/1
4 TABLET, FILM COATED ORAL EVERY 8 HOURS PRN
Qty: 20 TABLET | Refills: 0 | Status: SHIPPED | OUTPATIENT
Start: 2025-05-24

## 2025-05-24 RX ORDER — PANTOPRAZOLE SODIUM 40 MG/1
40 TABLET, DELAYED RELEASE ORAL 2 TIMES DAILY
Qty: 60 TABLET | Refills: 0 | Status: SHIPPED | OUTPATIENT
Start: 2025-05-24

## 2025-05-24 RX ORDER — LANOLIN ALCOHOL/MO/W.PET/CERES
100 CREAM (GRAM) TOPICAL DAILY
Qty: 30 TABLET | Refills: 0 | Status: SHIPPED | OUTPATIENT
Start: 2025-05-24

## 2025-05-24 RX ORDER — METOCLOPRAMIDE 5 MG/1
10 TABLET ORAL 3 TIMES DAILY
Qty: 90 TABLET | Refills: 0 | Status: SHIPPED | OUTPATIENT
Start: 2025-05-24

## 2025-05-24 RX ORDER — LEVOTHYROXINE SODIUM 125 UG/1
125 TABLET ORAL
Qty: 30 TABLET | Refills: 0 | Status: SHIPPED | OUTPATIENT
Start: 2025-05-24

## 2025-05-24 RX ORDER — SUCRALFATE 1 G/1
1 TABLET ORAL 4 TIMES DAILY
Qty: 160 TABLET | Refills: 0 | Status: SHIPPED | OUTPATIENT
Start: 2025-05-24

## 2025-05-24 RX ORDER — OXYCODONE HYDROCHLORIDE 5 MG/1
5-10 TABLET ORAL EVERY 6 HOURS PRN
Qty: 16 TABLET | Refills: 0 | Status: SHIPPED | OUTPATIENT
Start: 2025-05-24

## 2025-05-24 RX ADMIN — POTASSIUM CHLORIDE 20 MEQ: 1500 TABLET, EXTENDED RELEASE ORAL at 10:22

## 2025-05-24 RX ADMIN — SUCRALFATE 1 G: 1 TABLET ORAL at 13:16

## 2025-05-24 RX ADMIN — TOPIRAMATE 100 MG: 50 TABLET, FILM COATED ORAL at 10:23

## 2025-05-24 RX ADMIN — THIAMINE HCL TAB 100 MG 100 MG: 100 TAB at 10:21

## 2025-05-24 RX ADMIN — LINACLOTIDE 145 MCG: 145 CAPSULE, GELATIN COATED ORAL at 10:28

## 2025-05-24 RX ADMIN — SENNOSIDES AND DOCUSATE SODIUM 2 TABLET: 50; 8.6 TABLET ORAL at 10:20

## 2025-05-24 RX ADMIN — LEVOTHYROXINE SODIUM 125 MCG: 125 TABLET ORAL at 10:22

## 2025-05-24 RX ADMIN — POLYETHYLENE GLYCOL 3350 17 G: 17 POWDER, FOR SOLUTION ORAL at 10:19

## 2025-05-24 RX ADMIN — PANCRELIPASE 8 CAPSULE: 60000; 12000; 38000 CAPSULE, DELAYED RELEASE PELLETS ORAL at 13:18

## 2025-05-24 RX ADMIN — ONDANSETRON 4 MG: 4 TABLET, ORALLY DISINTEGRATING ORAL at 06:26

## 2025-05-24 RX ADMIN — SIMETHICONE 80 MG: 80 TABLET, CHEWABLE ORAL at 10:21

## 2025-05-24 RX ADMIN — CYCLOBENZAPRINE 10 MG: 10 TABLET, FILM COATED ORAL at 02:17

## 2025-05-24 RX ADMIN — ACETAMINOPHEN 650 MG: 325 TABLET, FILM COATED ORAL at 10:21

## 2025-05-24 RX ADMIN — PANTOPRAZOLE SODIUM 40 MG: 40 TABLET, DELAYED RELEASE ORAL at 10:23

## 2025-05-24 RX ADMIN — OXYCODONE 5 MG: 5 TABLET ORAL at 06:24

## 2025-05-24 RX ADMIN — GABAPENTIN 400 MG: 400 CAPSULE ORAL at 10:22

## 2025-05-24 RX ADMIN — METHYLNALTREXONE BROMIDE 8 MG: 12 INJECTION, SOLUTION SUBCUTANEOUS at 10:20

## 2025-05-24 RX ADMIN — HYDROCORTISONE 10 MG: 10 TABLET ORAL at 10:22

## 2025-05-24 RX ADMIN — METOCLOPRAMIDE 10 MG: 10 TABLET ORAL at 10:22

## 2025-05-24 RX ADMIN — ENOXAPARIN SODIUM 40 MG: 40 INJECTION SUBCUTANEOUS at 13:16

## 2025-05-24 RX ADMIN — DULOXETINE 90 MG: 60 CAPSULE, DELAYED RELEASE ORAL at 10:21

## 2025-05-24 RX ADMIN — SUCRALFATE 1 G: 1 TABLET ORAL at 10:20

## 2025-05-24 RX ADMIN — SIMETHICONE 80 MG: 80 TABLET, CHEWABLE ORAL at 13:16

## 2025-05-24 ASSESSMENT — ACTIVITIES OF DAILY LIVING (ADL)
ADLS_ACUITY_SCORE: 62

## 2025-05-24 NOTE — DISCHARGE SUMMARY
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      Follow-ups Needed After Discharge   Follow-up Appointments       ADULT [facility] Specialty Follow-up and recommended labs and tests      Follow up  with GI, endocrinology, pain clinic     Appointments on [place] and/or [facility] (with [facility] or [facility] provider or service). Call [phone] if you haven't heard regarding these appointments within 7 days of discharge.        Follow Up ([facility])      Call patient to schedule follow up with Dr. Maher, endocrine in 2-3 weeks after discharge for hospital follow up    Appointments on [place] and/or [facility] (with [facility] or [facility] provider or service). Call [phone] if you haven't heard regarding these appointments within 7 days of discharge.        Hospital Follow-up with Existing Primary Care Provider (PCP)          Schedule Primary Care visit within: 7 Days   Recommended labs and Imaging (to be ordered by Primary Care Provider): follow up  "on pain  and diabetes               Discharge Disposition   Discharged to home  Condition at discharge: Stable    Hospital Course     Chantell Kidd is a 59 year old female admitted on 5/15/2025. She has PMH of insulin-dependent diabetes mellitus after pancreatectomy, chronic pancreatitis, migraine, hypothyroidism, and venessa-en-Y with G tube nutrition who presented due to abdominal and back pain, will be admitted to medicine team for management.        GI and pain services rec to minimize narcotics and needs aggressive bowel regime as major contribute to her abdominal pain is constipation and I did discussed with  patient       Exertional shortness of breath   Chest pain  on deep inspiration  - chest CTA negative for pulmonary embolism        Urinary incontinence  - over past few days stats when gets up just looses bladder control  -  UA not pointing to infection, suspect symptoms are due to  constipation        Acute on chronic abdominal pain   Transaminitis  Hx Venessa-en-Y (2012) s/p  G-tube and J tube placement for nutrition  S/p cholecystectomy (2004)  Severe constipation   Idiopathic chronic pancreatitis status post  TPIAT in 2012      Patient has had multiple recent admissions due to abdominal pain and issues with PEG tube. Admitted 4/16-2/24 as well as 4/30-5/6 with PEG-J repositioned/replaced by GI on 4/17 as well as 5/2. She presented again 5/15 due to acute worsening of abdominal pain, worse with TF though she has been able to continue them.   -PTA had  been using Tylenol 1000mg q6 for the last month  -CT AP w/o on admit with no acute abdominal findings, chronic parenchymal liver disease. central pneumobilia, stable compared to CT 5/15/2025  - on CT chest 5/22 \"  Layering hyperdense material within the gastric lumen is strongly  favored to represent ingested contents.\"  - GJ in place. G tube has been hooked up to intermittent suction   -elevated LFTs with --->30 , ALT 89--->37 , Alk phos 152--->11. Tbili " "0.3--->0.2   - GI and pain services rec to minimize narcotics and needs aggressive bowel regime as major contribute to her abdominal pain is constipation   - 5/21 still with ongoing abdominal pain ,hooked up G tube to intermittent suction but does not seem to be helping    -LFT back to normal   - Per GI 5/22:- Consider Motegrity 2 mg once daily in addition to Linzess (or can go up to Linzess 290 mcg daily). Would hold off at this time given close to discharge but can be done outpatient.   - Would recommend a dose of Methylnaltrexone 12 mg subcutaneous (can be done once daily).  -  glycerin suppositories, mineral oil enemas as needed .   - Reglan 10 mg TID for gastroparesis (although more helpful for nausea rather than pain).      Per pain services 5/22:         - Continue PTA Creon 96,000U TID   -- Continue PTA famotidine, protonix, Linzess, reglan and sucralfate  -- Zofran PRN for nausea   -- MiraLAX 3 times daily, senna Colace twice daily.  Dulcolax and tapwater enema as needed.     - discussed with  patient  the importance of bowel regime ,   -   per GI  \" Work-up so far with negative hepatitis panel, F-actin levels, A1AT, PETh, ceruloplasmin, IgG, unremarkable iron studies. Most likely overall related to MAFLD with some labs still pending. Transaminitis overall improving.   Can follow-up outpatient with PCP for possible fibroscan to assess for fibrosis. No further work-up required at this time. \"   - A1At 123,   -Tissues transglutaminase IgA 1, IgG < 0.6   -IgG 729   - PETH ceruloplasmin  29 < 10   - ferritin 38   -   - ERLINDA negative   -F-actin ( smooth muscle ) 2   - viral hepatitis juts ordered so pending    - mitochondrial M2 An IgG  negative    - liver US \"1.  Coarse hepatic echotexture and increased echogenicity suggestive  of steatosis and chronic parenchymal liver disease.    2.  Findings compatible with central pneumobilia, stable compared to  CT 5/15/2025.  \"        Follow-up in GI clinic " 06/18/2025         C/f recent ANGELINA, improved  Patient had labs with PCP during hospital follow up appointment on 5/13 during which her Cr was 3.05, up from her baseline of ~0.4. She presented on 5/15 to the ED with abdominal pain that radiates to the back. Also complaining of increasing urinary frequency and mild dysuria. Cr on admit back to baseline of 0.41. UA negative for signs of infection, though with significant glucosuria which may be contributing to symptoms. Question outpatient lab accuracy.  - creatinine now remains ~ 0.4      Post-pancreatectomy diabetes (Type 1)  Chronic Pancreatitis and Sphincter of Oddi dysfunction s/p TPAIT (1/2012)  Patient has poorly controlled diabetes with multiple presentations with glucose >700. Today, presented with BG of 571. Most recent HbA1c of 15.8 4/30/25. No anion gap, Ketones <0.18. In the ED, she was given 1L IVF and glucose improved to 354. Has previously been seen by inpatient endocrinology team with concern for patient not using insulin at home, though she reports taking her insulin as directed prior to admission. Most recent OP Endo visit was on 5/13. Home regimen includes Lantus 18U qAM and at bedtime, NPH 8U qAM, and Novolog sliding scale, and ICR 1U:12CHO with meals. Most readings at home are significantly elevated and this has been a target of outpatient management.   -- Endocrinology consulted, appreciate recommendations  -- Diabetes regimen per endocrine.  -- Continue PTA Creon 96,000U TID with meals        per endo:  Plan/Recommendations:   - Lantus 10 units q 24 hrs at 0930  - NPH insulin: increase to 10 units from 8 units daily at 0930 for tube feeding  - NPH insulin: take 10 units daily at 2100 for tube feeding  - Novolog Meal Coverage: 1 unit per 12 grams CHO, TID AC and 1:15g CHO PRN with snacks/supplements  - Novolog Correction Scale: medium resistance (ISF: 50) TID AC / HS / 0200        BLE Edema  Has been ongoing for several months, she notes being  started on Lasix 20mg daily and this was increased to 40mg BID by provider in [place] in the fall. She had BLE US during most recent admission 4/30/25 that was negative for DVT and confirmed soft tissue edema bilaterally. Over the last couple weeks, left leg is slightly more swollen than the right. No history of injury, fracture, or surgery on this leg before. No erythema or wounds.  Better   -- takes  PTA Lasix 40mg BID as needed , no wedema resolved   -- GAIL hose during the day     Malnutrition   Cachexia   GJ in place with continuous TF at 45ml/hour. Reports she has been able to continue at this rate despite abdominal discomfort.   - continue TF                   History of adrenal insufficiency  Followed by Dr. Maher. Previously suppressed AM cortisol and has been on empiric therapy for possible adrenal insufficiency, unclear if central vs transient. Had previously been unable to wean steroids due to hypoglycemia episodes. Most recently saw Dr Maher 4/3/25 and goal is to retest in the near future with low dose ACTH stimulation testing.  -- Continue PTA hydrocortisone 10mg in the AM and 15mg in the PM , state son 5 and 10 mg respectively at home      Hypothyroidism  -- Continue PTA levothyroxine      MDD, Anxiety  Insomnia  -- Continue PTA duloxetine, trazodone, topiramate     Mild Hypokalemia: replaced           Consultations This Hospital Stay   GI HEPATOLOGY ADULT IP CONSULT  NUTRITION SERVICES ADULT IP CONSULT  PAIN MANAGEMENT ADULT IP CONSULT  ENDOCRINE DIABETES ADULT IP CONSULT  CARE MANAGEMENT / SOCIAL WORK IP CONSULT  NUTRITION SERVICES ADULT IP CONSULT  CARE MANAGEMENT / SOCIAL WORK IP CONSULT  NUTRITION SERVICES ADULT IP CONSULT  PHARMACY IP CONSULT  NURSING TO CONSULT FOR VASCULAR ACCESS CARE IP CONSULT  PAIN MANAGEMENT ADULT IP CONSULT    Code Status   Full Code    Time Spent on this Encounter   Rosy OLSEN MD, personally saw the patient today and spent greater than 30 minutes discharging this  patient.       Rosy Basilio MD  [facility] MED SURG ORTHOPEDIC  [address] [address]  Phone: [phone]  Fax: [phone]  ______________________________________________________________________    Physical Exam   Vital Signs: Temp: 98.5  F (36.9  C) Temp src: Oral BP: 121/69 Pulse: 85   Resp: 17 SpO2: 98 % O2 Device: None (Room air)    Weight: 105 lbs 6.08 oz  General appearence: awake alert  in  no apparent distress     NECK : supple  RESPIRATORY: lungs clear   CARDIOVASCULAR:S1 S2 regular rate and rhythm,   GASTROINTESTINAL: G J tube site looks good , + bowel sounds , defuse abdominal tenderness no rebound or guarding , decreased but present bowel sounds   SKIN: warm and dry, no mottling noted   NEUROLOGIC; awake alert and oriented  EXTREMITIES: no clubbing, cyanosis or edema , moves all extremity,              Primary Care Physician   Ron Morgan    Discharge Orders      Primary Care - Care Coordination Referral      Follow Up ([facility])    Call patient to schedule follow up with Dr. Maher, endocrine in 2-3 weeks after discharge for hospital follow up    Appointments on [place] and/or [facility] (with [facility] or [facility] provider or service). Call [phone] if you haven't heard regarding these appointments within 7 days of discharge.     Reason for your hospital stay    Your were admitted with acute  on chronic abdominal pain, make sure to try and minimize oxycodone as is adding on to yout constipation and making your abdominal pain worse.   Continue with your tube feeding   Fi with your primary care MD, GI , endo and pain clinic     Activity    Your activity upon discharge: activity as tolerated     Tubes and Drains    GI Enteral Access Device LLQ Jejunostomy 14 fr 22 days    GI Enteral Access Device LUQ Gastrostomy 18 fr 22 days     Diet    Follow this diet upon discharge: resume previous diet and tube feeding     Hospital Follow-up with Existing  Primary Care Provider (PCP)            Significant Results and Procedures   Most Recent 3 CBC's:  Recent Labs   Lab Test 05/24/25  0622 05/21/25  0613 05/18/25  0611   WBC 7.7 8.8 9.9   HGB 11.5* 11.4* 10.7*   MCV 94 94 94   * 618* 573*     Most Recent 3 BMP's:  Recent Labs   Lab Test 05/24/25  1215 05/24/25  1038 05/24/25  0814 05/24/25  0622 05/22/25  0843 05/22/25  0553 05/21/25  0829 05/21/25  0613   NA  --   --   --  137  --  137  --  133*   POTASSIUM  --   --   --  4.2  --  4.2  --  4.0   CHLORIDE  --   --   --  103  --  103  --  102   CO2  --   --   --  24  --  25  --  25   BUN  --   --   --  19.1  --  17.6  --  14.6   CR  --   --   --  0.45*  --  0.44*  --  0.47*   ANIONGAP  --   --   --  10  --  9  --  6*   ELIZA  --   --   --  8.8  --  8.7*  --  8.7*   * 381* 228* 230*   < > 310*   < > 323*    < > = values in this interval not displayed.     Most Recent 2 LFT's:  Recent Labs   Lab Test 05/24/25  0622 05/21/25  0613   AST 23 30   ALT 30 37   ALKPHOS 104 111   BILITOTAL 0.2 0.2     Most Recent Urinalysis:  Recent Labs   Lab Test 05/22/25  1345   COLOR Light Yellow   APPEARANCE Clear   URINEGLC >=1000*   URINEBILI Negative   URINEKETONE Negative   SG 1.019   UBLD Negative   URINEPH 6.5   PROTEIN Negative   NITRITE Negative   LEUKEST Negative   RBCU 0   WBCU 2   ,   Results for orders placed or performed during the hospital encounter of 05/15/25   CT Abdomen Pelvis w/o Contrast    Narrative    EXAMINATION: CT ABDOMEN PELVIS W/O CONTRAST, 5/15/2025 11:22 AM    TECHNIQUE:  Helical CT images from the lung bases through the pubic  symphysis were obtained  without IV contrast.     COMPARISON: 4/30/2025    HISTORY: lower abdominal pain, LLQ>LUQ, new acute kidney injury    FINDINGS:    Abdomen and pelvis: Status post splenectomy, total pancreatectomy,  Venessa-en-Y with choledochojejunostomy and gastrojejunostomy,  hysterectomy, and appendectomy. G-tube in place. Left lower quadrant  J-tube in place. Mild  pneumobilia, similar to prior. No hepatic  masses. Hypoattenuating kidneys, otherwise unremarkable. Similar  perianastomotic dilation in the right lower quadrant. No significant  lingular loops of bowel. Large stool burden. Unremarkable urinary  bladder. No free air or fluid in the abdomen.    Lung bases:  Unremarkable    Bones and soft tissues: No acute or suspicious lesions. Diffuse  osteopenia.      Impression    IMPRESSION:   1. Hypoattenuating kidneys, most likely secondary to medical renal  disease.  2. Otherwise no acute findings in the abdomen and pelvis in this  limited noncontrast CT examination.  3. Extensive surgical changes. G-tube and J-tube are in place.    I have personally reviewed the examination and initial interpretation  and I agree with the findings.    GUICHO WATSON MD         SYSTEM ID:  [id number]   US Abdomen Limited    Narrative    EXAMINATION: Limited Abdominal Ultrasound, 5/16/2025 10:47 AM     COMPARISON: US ABDOMEN LIMITED WITH DOPPLER COMPLETE 7/7/2016    HISTORY: Assess for hepatic steatosis, cirrhosis    FINDINGS:     Fluid:   No evidence of ascites or pleural effusions.    Liver:   Coarse heterogeneous echotexture with mild increased echogenicity.  Scattered echogenic foci with ringdown artifact, compatible with  central pneumobilia seen on CT 5/15/2025.  No focal mass. Main portal  vein patent with antegrade flow.  Liver measures 13.7 cm craniocaudal.       Gallbladder:   There is no wall thickening, pericholecystic fluid, positive  sonographic Lopez's sign or evidence for cholelithiasis.    Bile Ducts:   Both the intra- and extrahepatic biliary system are of normal caliber.     The common bile duct measures 5 mm in diameter.    Pancreas:   Pancreas is surgically absent.    Kidney:   The right kidney measures 11.8 cm long.  There is no hydronephrosis or hydroureter, no shadowing renal calculi,  cystic lesion or mass.     Partially visualized IVC is patent.  Aorta measures 2.5  cm in AP dimension.      Impression    IMPRESSION:   1.  Coarse hepatic echotexture and increased echogenicity suggestive  of steatosis and chronic parenchymal liver disease.    2.  Findings compatible with central pneumobilia, stable compared to  CT 5/15/2025.      I have personally reviewed the examination and initial interpretation  and I agree with the findings.    MILLY BECERRA DO         SYSTEM ID:  [id number]   XR Abdomen 1 View    Narrative    EXAMINATION:  XR ABDOMEN 1 VIEW 5/19/2025     COMPARISON: CT abdomen and pelvis 5/15/2025    HISTORY: fu severe constipation. Diffuse abdominal pain..    TECHNIQUE: 2 frontal supine images of the abdomen.    FINDINGS: Percutaneous gastrostomy and left jejunostomy tubes project  over the left hemiabdomen. Cholecystectomy clips. Pneumobilia. Similar  2 slightly increased large colonic stool burden. No air-filled,  dilated loops of small bowel. Anastomotic suture lines in the right  abdomen and central surgical clips. The partially imaged lung bases  are unremarkable.       Impression    IMPRESSION:   1. Large colonic stool burden, similar to slightly increased compared  to 5/2/2025 radiograph and 5/15/2025 CT.  2. Persistent small amount of pneumobilia.    I have personally reviewed the examination and initial interpretation  and I agree with the findings.    JANETTE MIGUEL MD         SYSTEM ID:  [id number]   CT Chest Pulmonary Embolism w Contrast    Narrative    EXAM: CTA pulmonary angiogram, 5/22/2025 1:28 PM    HISTORY: SOB, chets pain rule out PE    COMPARISON: CT chest abdomen and pelvis 1/28/2025.    TECHNIQUE: Volumetric CT images obtained through the chest with  contrast. Coronal and axial MIP reformatted images obtained.  Three-dimensional (3D) post-processed angiographic images were  reconstructed, archived to PACS and used in interpretation of this  study.     CONTRAST: 50mL Isovue 370 ml isovue 370 IV.    FINDINGS:     Vascular: There is good contrast  opacification of the pulmonary  arterial vasculature. No pulmonary embolus.  Heart is normal size  without pericardial effusion. No evidence of right heart strain or  elevated right heart pressures. No thoracic aortic aneurysm.    Remaining Chest: Central tracheobronchial tree is patent. No  consolidation, mass, or suspicious pulmonary nodules. Dependent  subsegmental atelectasis within bilateral lower lobes. No pleural  effusion or pneumothorax. No thoracic lymphadenopathy.    Upper Abdomen: Limited evaluation demonstrates no acute findings.  Cholecystectomy. Pneumobilia, similar to prior CT abdomen and pelvis  1/28/2025, likely sequela of sphincterotomy. No convincing artifact is  present as an acute GI bleed evidence of portal venous gas. Layering  hyperdensities within the gastric lumen otherwise distended by aerated  ingested contents, favored to represent ingested material. Large  amount of stool within the visible colon.    Bones/Soft Tissues: No acute abnormalities or suspicious lesions.  Biopsy clip within the right breast.      Impression    IMPRESSION:  1. Exam is negative for acute pulmonary embolism.  No evidence of  right heart strain or increased right heart pressures.   Evidence for right heart strain or increased right heart pressures?   is not present.   2. Layering hyperdense material within the gastric lumen is strongly  favored to represent ingested contents.  3. Chronic pneumobilia, likely sequela of biliary sphincterotomy.      In the event of a positive result for acute pulmonary embolism  resulting in right heart strain, consider calling the   [facility] hospital  for PERT (Pulmonary Embolism Response Team)  Activation?    PERT -- Pulmonary Embolism Response Team (Multidisciplinary team  including cardiology, interventional radiology, critical care,  hematology)    I have personally reviewed the examination and initial interpretation  and I agree with the findings.    BUZZ AVALOS,  MD         SYSTEM ID:  [id number]     *Note: Due to a large number of results and/or encounters for the requested time period, some results have not been displayed. A complete set of results can be found in Results Review.       Discharge Medications   Current Discharge Medication List        START taking these medications    Details   diclofenac (VOLTAREN) 1 % topical gel Apply 2 g topically 4 times daily as needed for moderate pain.  Qty: 50 g, Refills: 0    Associated Diagnoses: Left knee pain, unspecified chronicity      glycerin (ADULT) 2 g suppository Place 1 suppository rectally daily as needed for constipation.    Associated Diagnoses: Obstipation      insulin  UNIT/ML injection Inject 10 Units subcutaneously 2 times daily.  Qty: 15 mL, Refills: 0    Associated Diagnoses: Major depressive disorder, recurrent episode, moderate (H)      Lidocaine (LIDOCARE) 4 % Patch Place 1-2 patches over 12 hours onto the skin every 24 hours. To prevent lidocaine toxicity, patient should be patch free for 12 hrs daily.  Qty: 20 patch, Refills: 0    Associated Diagnoses: Hyperglycemia      naloxone (NARCAN) 4 MG/0.1ML nasal spray Spray 1 spray (4 mg) into one nostril alternating nostrils as needed for opioid reversal. every 2-3 minutes until assistance arrives  Qty: 2 each, Refills: 0    Associated Diagnoses: Chronic, continuous use of opioids      simethicone (MYLICON) 80 MG chewable tablet Take 1 tablet (80 mg) by mouth 4 times daily.  Qty: 160 tablet, Refills: 0    Associated Diagnoses: Abdominal bloating           CONTINUE these medications which have CHANGED    Details   cyclobenzaprine (FLEXERIL) 10 MG tablet Take 1 tablet (10 mg) by mouth or Feeding Tube 3 times daily as needed for muscle spasms.  Qty: 60 tablet, Refills: 0    Associated Diagnoses: Generalized abdominal pain      !! insulin aspart (NOVOLOG PEN) 100 UNIT/ML pen Continue Novolog Meal Coverage: 1 unit per 12 grams CHO, TID AC and 1:15 PRN with  snacks/supplements - Continue Novolog Correction Scale: 1:75>150 TID AC, HS, 0200 at 1400  Qty: 15 mL, Refills: 11    Associated Diagnoses: Exocrine pancreatic insufficiency      !! insulin aspart (NOVOLOG PEN) 100 UNIT/ML pen -novolog insulin: 1 unit for every 12 grams of carbs with meals and snacks plus correction insulin per scale below: Meal time sliding scale insulin:If -199 give 1 unit If -249 give 2 units If -299 give 3 units If -349 give 4 units If BG greater than 350 give 5 units, Bedtime sliding scale insulin: If -249 give 1 unit If -299 give 2 units If -349 give 3 units If BG greater than 350 give 4 units  Qty: 15 mL, Refills: 0    Associated Diagnoses: Type 1 diabetes mellitus with hypoglycemia and without coma (H)      insulin glargine (LANTUS PEN) 100 UNIT/ML pen Inject 10 Units subcutaneously every 24 hours.  Qty: 15 mL, Refills: 0    Comments: If Lantus is not covered by insurance, may substitute Basaglar or Semglee or other insulin glargine product per insurance preference at same dose and frequency.    Associated Diagnoses: Type 1 diabetes mellitus with hypoglycemia and without coma (H)      levothyroxine (SYNTHROID/LEVOTHROID) 125 MCG tablet Take 1 tablet (125 mcg) by mouth every morning (before breakfast).  Qty: 30 tablet, Refills: 0    Associated Diagnoses: Hypothyroidism, unspecified type      linaclotide (LINZESS) 145 MCG capsule Take 1 capsule (145 mcg) by mouth every morning (before breakfast). as needed  Qty: 30 capsule, Refills: 0    Associated Diagnoses: Gastroparesis      lipase-protease-amylase (CREON 12) 67092-15560-28082 units CPEP Take 8 capsules by mouth 3 times daily (with meals). 6 capsules with snacks    Associated Diagnoses: Exocrine pancreatic insufficiency      metoclopramide (REGLAN) 5 MG tablet Take 2 tablets (10 mg) by mouth 3 times daily.  Qty: 90 tablet, Refills: 0    Associated Diagnoses: Nausea      ondansetron (ZOFRAN) 4 MG  tablet Take 1 tablet (4 mg) by mouth every 8 hours as needed for nausea.  Qty: 20 tablet, Refills: 0    Associated Diagnoses: Nausea      oxyCODONE (ROXICODONE) 5 MG tablet Take 1-2 tablets (5-10 mg) by mouth every 6 hours as needed for pain. Take the lowest possible dose to control your pain, and use non-opioid pain options first. Decrease the number of pills you take each day after you discharge until you are able to stop completely.  Qty: 16 tablet, Refills: 0    Associated Diagnoses: Generalized abdominal pain      pantoprazole (PROTONIX) 40 MG EC tablet Take 1 tablet (40 mg) by mouth 2 times daily.  Qty: 60 tablet, Refills: 0    Associated Diagnoses: H. pylori infection      polyethylene glycol (MIRALAX) 17 GM/Dose powder Take 17 g by mouth 3 times daily.  Qty: 510 g, Refills: 0    Associated Diagnoses: Chronic constipation      senna-docusate (SENOKOT-S/PERICOLACE) 8.6-50 MG tablet Take 2 tablets by mouth 2 times daily.  Qty: 160 tablet, Refills: 0    Associated Diagnoses: Obstipation      sodium chloride, PF, 0.9% PF flush Inject 10-40 mLs into catheter every 8 hours. -- Flush J port and G port EACH with 30 ml water every 8 hours -- Flush J port with 30 ml water BEFORE AND AFTER medications to avoid clogging of this tube.  Qty: 500 mL, Refills: 0    Associated Diagnoses: Dislodged gastrostomy tube      sucralfate (CARAFATE) 1 GM tablet Take 1 tablet (1 g) by mouth 4 times daily.  Qty: 160 tablet, Refills: 0    Associated Diagnoses: Nausea      thiamine (B-1) 100 MG tablet Place 1 tablet (100 mg) into Feeding Tube daily.  Qty: 30 tablet, Refills: 0    Associated Diagnoses: RUQ abdominal pain      topiramate (TOPAMAX) 100 MG tablet Take 1 tablet (100 mg) by mouth 2 times daily.  Qty: 50 tablet, Refills: 0    Associated Diagnoses: Intractable chronic migraine without aura and without status migrainosus      traZODone (DESYREL) 100 MG tablet TAKE 1-2 TABLETS BY MOUTH AN HOUR BEFORE BEDTIME  Qty: 60 tablet,  Refills: 0    Associated Diagnoses: Primary insomnia; Constipation, unspecified constipation type; Chronic back pain, unspecified back location, unspecified back pain laterality; Physical deconditioning       !! - Potential duplicate medications found. Please discuss with provider.        CONTINUE these medications which have NOT CHANGED    Details   acetaminophen (TYLENOL) 325 MG tablet Take 3 tablets (975 mg) by mouth every 8 hours  Qty:        Acetone, Urine, Test (KETONE TEST) STRP 1 each by In Vitro route as needed (hyperglycemia)  Qty: 50 strip, Refills: 0    Associated Diagnoses: Type 1 diabetes mellitus with hypoglycemia and without coma (H)      Alcohol Swabs PADS Use to swab the area of the injection or tiago as directed Per insurance coverage  Qty: 200 each, Refills: 1    Associated Diagnoses: Type 1 diabetes mellitus with hypoglycemia and without coma (H)      alendronate (FOSAMAX) 70 MG tablet Take 1 tablet (70 mg) by mouth every 7 days On Sundays take first thing in the morning with plain water and remain upright for at least 30 minutes and until after first food of the day    Do not restart Fosamax (alendronate) until your difficulty swallowing has resolved and you have finished the entire course of fluconazole (Diflucan).  Qty: 4 tablet, Refills: 0    Comments: Hold pending GI evaluation in 4-6 weeks  Associated Diagnoses: Age-related osteoporosis without current pathological fracture      alum & mag hydroxide-simethicone (MYLANTA/MAALOX) 200-200-25 MG CHEW chewable tablet Take 1 tablet by mouth 3 times daily as needed for indigestion      Continuous Glucose  (DEXCOM G7 ) RICARDO Use to read blood sugars as per 's instructions.  Qty: 1 each, Refills: 0    Associated Diagnoses: Post-pancreatectomy diabetes (H)      Continuous Glucose Sensor (DEXCOM G7 SENSOR) MISC Change every 10 days.  Qty: 3 each, Refills: 5    Associated Diagnoses: Post-pancreatectomy diabetes (H); Cell  transplant; Hypoglycemia; Exocrine pancreatic insufficiency      CONTOUR NEXT TEST test strip USE TO TEST BLOOD SUGAR 6 TIMES DAILY OR AS DIRECTED. Call clinic to schedule follow up appointment.  IMPORTANT  Qty: 600 strip, Refills: 1    Associated Diagnoses: Post-pancreatectomy diabetes (H)      Digestive Enzyme Cartridge (RELIZORB) Device Place 1 each (1 Device) into OG Tube 2 times daily. Administer as 1 cartridge every 12 hours  Qty: 60 each, Refills: 11    Associated Diagnoses: Exocrine pancreatic insufficiency      dronabinol (MARINOL) 2.5 MG capsule Take 2 capsules (5 mg) by mouth 2 times daily as needed  Qty: 56 capsule, Refills: 5    Associated Diagnoses: Routine health maintenance      !! DULoxetine (CYMBALTA) 30 MG capsule Take 1 capsule (30 mg) by mouth daily With 60mg capsule for total dose of 90mg  Qty: 90 capsule, Refills: 0    Associated Diagnoses: Major depressive disorder, recurrent episode, moderate (H); CIERRA (generalized anxiety disorder)      !! DULoxetine (CYMBALTA) 60 MG EC capsule Take 1 capsule (60 mg) by mouth daily With 30mg capsule for total dose of 90mg  Qty: 90 capsule, Refills: 1    Associated Diagnoses: Major depressive disorder, recurrent episode, moderate (H); CIERRA (generalized anxiety disorder)      famotidine (PEPCID) 20 MG tablet Take 1 tablet (20 mg) by mouth At Bedtime  Qty: 30 tablet, Refills: 0    Associated Diagnoses: Gastroesophageal reflux disease without esophagitis      furosemide (LASIX) 40 MG tablet Take 1 tablet (40 mg) by mouth daily.  Qty: 30 tablet, Refills: 0    Associated Diagnoses: RUQ abdominal pain      gabapentin (NEURONTIN) 400 MG capsule Take 1 capsule (400 mg) by mouth 3 times daily.  Qty: 90 capsule, Refills: 2    Associated Diagnoses: RUQ abdominal pain; Exocrine pancreatic insufficiency      Glucagon (GVOKE HYPOPEN 2-PACK) 1 MG/0.2ML pen Inject the contents of 1 device under the skin into lower abdomen, outer thigh, or outer upper arm as needed for  hypoglycemia. If no response after 15 minutes, additional 1 mg dose from a new device may be injected while waiting for emergency assistance.  Qty: 0.4 mL, Refills: 0    Associated Diagnoses: Type 1 diabetes mellitus with hypoglycemia and without coma (H)      !! hydrocortisone (CORTEF) 10 MG tablet Take 10 mg in the morning and 10 mg along with a 5 mg tablet at bedtime for a total dose of 15 mg at bedtime. Watch for hypoglycemia recurrence.      !! hydrocortisone (CORTEF) 5 MG tablet Take 5 mg by mouth At Bedtime along with a 10 mg tablet for a total dose of 15 mg      hydrocortisone sodium succinate PF (SOLU-CORTEF) 100 MG injection Inject 100mg (1 mL) into muscle in emergency or unable to take oral hydrocortisone. Go to emergency room if given. ActoVial NDC [address]-10. Note to pharmacy: Provide two 3ml syringes with 23g 1 inch needles.  Qty: 1 mL, Refills: 0    Associated Diagnoses: Adrenal insufficiency      Injection Device for insulin (NOVOPEN ECHO) RICARDO Use with   Insulin  Qty: 1 each, Refills: 0    Associated Diagnoses: Post-pancreatectomy diabetes (H)      Insulin Disposable Pump (OMNIPOD 5 G6 INTRO, GEN 5,) KIT 1 each See Admin Instructions Use continuously to infuse insulin.  Qty: 1 kit, Refills: 0    Associated Diagnoses: Post-pancreatectomy diabetes (H)      insulin pen needle (PIP PEN NEEDLES 32G X 4MM) 32G X 4 MM miscellaneous Use 6 pen needles daily or as directed.  Qty: 200 each, Refills: 5    Associated Diagnoses: Post-pancreatectomy diabetes (H)      !! [employer] 1.5 Peptide Plain 325 mL Place 1,080 mLs into J tube daily. Infuse [employer] Peptide 1.5 @ 45 ml/hr into J-tube via pump  Water flush: 120 ml tid + an additional 550 ml through flushes or oral intake  Kcals: 1662  Cartons per day: 3.5  Qty: 93981 mL, Refills: 11    Associated Diagnoses: Exocrine pancreatic insufficiency      !! Nutritional Supplements (BOOST HIGH PROTEIN) LIQD After above baseline labs are drawn, give: 6 mL/kg to  maximum of 360 mL; the beverage is to be consumed within 5 minutes.  Refills: 0    Comments: If on pancreatic enzymes, patient may take home enzymes with Boost beverage.      Patients may take long acting insulin (Levemir and Lantus).      Patient should NOT cover Boost with Novolog, Humalog, Apidra, or regular insulin.  Associated Diagnoses: Post-pancreatectomy diabetes (H); Pancreatic insufficiency      potassium chloride ER (KLOR-CON M) 20 MEQ CR tablet Take 1 tablet (20 mEq) by mouth daily  Qty: 90 tablet, Refills: 1    Comments: Hold until pcp follow-up  Associated Diagnoses: Hypokalemia      sodium chloride (OCEAN) 0.65 % nasal spray Spray 1 spray into both nostrils daily as needed for congestion  Qty: 30 mL, Refills: 0    Associated Diagnoses: Irritation of nose      SUMAtriptan (IMITREX) 50 MG tablet Take 1 tablet (50 mg) by mouth at onset of headache for migraine Take 1 Tab by mouth Once as needed for Migraine Headache. May repeat after two hours.  Maximum dose 200 mg/24 hours.  Qty: 30 tablet, Refills: 1    Associated Diagnoses: Migraine      order for DME by [employer] [facility] [address] [place]  Ph: [phone]  Fax: [phone]    Nutren 1.5 @ 10 ml/hr with advancement by 10 ml/hr q 8 hours to goal rate of 40 ml/hr.  This will provide 1440 kcals (27 kcal/kg/day), 65 g PRO (1.2 g/kg/day), 730 mL H2O, 169 g CHO and no Fiber daily.     Qty: 1 Month, Refills: 3    Associated Diagnoses: Hypoglycemia; Nausea; Decreased oral intake; Exocrine pancreatic insufficiency; Type 1 diabetes mellitus with hypoglycemia and without coma (H); Generalized abdominal pain; Post-pancreatectomy diabetes (H); Cell transplant; On enteral nutrition      Sharps Container MISC Use as directed to dispose of needles, lancets and other sharps  Qty: 1 each, Refills: 1    Associated Diagnoses: Type 1 diabetes mellitus with hypoglycemia and without coma (H)       !! - Potential duplicate medications found. Please  discuss with provider.        STOP taking these medications       diclofenac (FLECTOR) 1.3 % Patch Comments:   Reason for Stopping:         diclofenac (VOLTAREN) 1 % GEL Comments:   Reason for Stopping:         insulin  UNIT/ML vial Comments:   Reason for Stopping:         Naloxone HCl 8 MG/0.1ML LIQD Comments:   Reason for Stopping:             Allergies   Allergies   Allergen Reactions    Corticosteroids Other (See Comments)     All oral, IV and injectable steroids are contraindicated (unless in life threatening situations) in [employer] [employer] transplant recipients. They can cause irreversible loss of islet cell function. Please contact patient's transplant care coordinator YURI Cortez RN at [phone]/pager [phone] and/or endocrinologist prior to administration.      Chocolate Flavoring Agent (Non-Screening) Rash     Breaks out when eats chocolate

## 2025-05-24 NOTE — PROGRESS NOTES
Care Management Discharge Note    Discharge Date: 05/24/2025  Discharge Disposition: Home  Discharge Services: Home care, home infusion (resumption of services)  Discharge DME: None  Discharge Transportation: Family or friend will provide  Private pay costs discussed: Not applicable  Education Provided on the Discharge Plan: Yes  Persons Notified of Discharge Plans: I liaison  Patient/Family in Agreement with the Plan: Yes    Handoff Referral Completed: Yes, [facility] PCP: Internal handoff referral completed    Additional Information:  Patient discharging to home today; IMM provided; patients  will provide discharge transportation. [facility] liaison Samantha informed of discharge; stated she would update [facility] on discharge status. No further discharge needs identified.       [facility]   Ph: [phone]  Fax: [phone]    [facility]   Ph: [phone]  Fax: [phone]         GOLD Segura  Covering for WB 5 Ortho    [place] [facility] [place] ([id number]) Saturday & Sunday; ([id number]) [facility] Recognized Holidays     Units: 5A Onc Vocera & 5C Vocera   Units: 6B Vocera & 6C Vocera   Units: 7A SOT RNCC Vocera, 7B Med Surg Vocera, & 7C Med Surg Vocera  Units: 6A Vocera & 4A CVICU Vocera, 4C MICU Vocera, and 4E SICU Vocera   Units: 5 Ortho Vocera & 5 Med Surg Vocera    Units: 6 Med Surg Vocera & 8 Med Surg Vocera

## 2025-05-24 NOTE — PLAN OF CARE
Problem: Adult Inpatient Plan of Care  Goal: Plan of Care Review  Outcome Evaluation: Plan for patient to discharge to home today.  Plan of Care Reviewed With: Patient

## 2025-05-24 NOTE — PROGRESS NOTES
VS: /69 (BP Location: Right arm)   Pulse 85   Temp 98.5  F (36.9  C) (Oral)   Resp 17   Wt 47.8 kg (105 lb 6.1 oz)   SpO2 98%   BMI 19.27 kg/m      O2: RA   Output: Voids in BSC   Last BM: 5/24   Activity: SBA   Skin: Scattered bruises, G/J tube insertions   Pain: Complains of constant ABD pain   CMS: Numbness/tingling in all extremities   Dressing: G/J insertion site   Diet: Consistent carb   LDA: removed   Equipment: BSC, walker   Plan: Discharge home today with    Additional Info:         TF tubing was not changed because she was due to discharge.  She left the unit by wheelchair and her  drove her home.  Her prescriptions were sent to her home pharmacy.  She had her belongings with her.

## 2025-05-24 NOTE — PLAN OF CARE
Goal Outcome Evaluation:Abdominal/Back Pain    Plan of Care Reviewed With: patient  Overall Patient Progress: improving    Vital signs:  Temp: 97.7  F (36.5  C) Temp src: Oral BP: 121/76 Pulse: 85   Resp: 18 SpO2: 97 % O2 Device: None (Room air)       Neuro: A/Ox4.   Bowel: Continent. Had 3 Lg Bms yesterday.  Bladder: Continent.  Pain: Intermittent abdominal pain 8/10 with PRN Oxycodone  Ambulation/Transfers: Independent to commode. Denied SOB, dizziness, light-headedness.  Diet/ Liquids: Regular and was tolerated with good appetite and adequate fluid intake. Intermittent nausea and was given with scheduled Reglan.  Tubes/ Lines/ Drains: R PIV, saline locked. G-tube to intermittent suction with kory faustin of yellowish drainage. J-tube with [employer] Peptide TF at 45ml/hr with FWF of 200 ml q4.  Tube Feeding: [employer] Peptide TF at 45ml/hr with FWF of 200 ml q4.  Oxygen: RA  Skin: Redness around the GJ Tube sites, cleansed with NS and covered with split gauze.  BG at 0200 was 143.  Possible discharge home today.  Continue POC

## 2025-05-24 NOTE — PROGRESS NOTES
[facility]  Start of Care/Resumption of Care Note    [facility] RAMAN    DX: Exocrine pancreatic insufficiency     Therapy: Enteral    Next Dose Due: 5/24/25    Delivery plan: Today by end of day     In-basket sent to [facility] Scheduling, requesting visit on n/a    Per [facility] care plan, labs are due on n/a    Nursing plan: Enteral Only.     Teaching Plan: Liaison Teach Done?: NA    Other Info: Spoke with patient and confirmed supply needs of 2x2 split gauze, tape, syringes, feeding tube bags, formula, and drainage bags (incorrect bags sent out 5/6, but delivery prior to 5/6 bags was correct).  Updated Enteral team.      Moraima Mcnair, RN 05/24/25

## 2025-05-25 PROCEDURE — S9342 HIT ENTERAL PUMP DIEM: HCPCS

## 2025-05-26 PROCEDURE — S9342 HIT ENTERAL PUMP DIEM: HCPCS

## 2025-05-27 ENCOUNTER — HOME INFUSION BILLING (OUTPATIENT)
Dept: HOME HEALTH SERVICES | Facility: HOME HEALTH | Age: 60
End: 2025-05-27
Payer: MEDICARE

## 2025-05-27 DIAGNOSIS — Z09 HOSPITAL DISCHARGE FOLLOW-UP: ICD-10-CM

## 2025-05-27 PROCEDURE — A4452 WATERPROOF TAPE: HCPCS

## 2025-05-27 PROCEDURE — S9342 HIT ENTERAL PUMP DIEM: HCPCS

## 2025-05-28 ENCOUNTER — TELEPHONE (OUTPATIENT)
Dept: TRANSPLANT | Facility: CLINIC | Age: 60
End: 2025-05-28
Payer: MEDICARE

## 2025-05-28 PROCEDURE — S9342 HIT ENTERAL PUMP DIEM: HCPCS

## 2025-05-28 NOTE — TELEPHONE ENCOUNTER
[facility] Call Center    Phone Message    May a detailed message be left on voicemail: yes     Reason for Call: Other: Marlon from [employer] called, he has been faxing over documents regarding prescription Dexcom G7. Please review and reply. Any questions please call Marlon at [phone]      Action Taken: Message routed to:  [facility] ALESHIA CADENA    Travel Screening: Not Applicable     Date of Service:

## 2025-05-29 ENCOUNTER — TELEPHONE (OUTPATIENT)
Dept: INTERNAL MEDICINE | Facility: CLINIC | Age: 60
End: 2025-05-29
Payer: MEDICARE

## 2025-05-29 ENCOUNTER — PATIENT OUTREACH (OUTPATIENT)
Dept: CARE COORDINATION | Facility: CLINIC | Age: 60
End: 2025-05-29
Payer: MEDICARE

## 2025-05-29 ENCOUNTER — TELEPHONE (OUTPATIENT)
Dept: ENDOCRINOLOGY | Facility: CLINIC | Age: 60
End: 2025-05-29
Payer: MEDICARE

## 2025-05-29 DIAGNOSIS — Z94.9 CELL TRANSPLANT: ICD-10-CM

## 2025-05-29 DIAGNOSIS — E16.2 HYPOGLYCEMIA: ICD-10-CM

## 2025-05-29 DIAGNOSIS — Z90.410 POST-PANCREATECTOMY DIABETES (H): ICD-10-CM

## 2025-05-29 DIAGNOSIS — E89.1 POST-PANCREATECTOMY DIABETES (H): ICD-10-CM

## 2025-05-29 DIAGNOSIS — E13.9 POST-PANCREATECTOMY DIABETES (H): ICD-10-CM

## 2025-05-29 DIAGNOSIS — K86.81 EXOCRINE PANCREATIC INSUFFICIENCY: ICD-10-CM

## 2025-05-29 PROCEDURE — S9342 HIT ENTERAL PUMP DIEM: HCPCS

## 2025-05-29 RX ORDER — ACYCLOVIR 400 MG/1
TABLET ORAL
Qty: 9 EACH | Refills: 5 | Status: SHIPPED | OUTPATIENT
Start: 2025-05-29

## 2025-05-29 RX ORDER — ACYCLOVIR 400 MG/1
TABLET ORAL
Qty: 1 EACH | Refills: 0 | Status: SHIPPED | OUTPATIENT
Start: 2025-05-29

## 2025-05-29 NOTE — PROGRESS NOTES
Clinic Care Coordination Contact  Transitions of Care Outreach    Chief Complaint   Patient presents with    Clinic Care Coordination - Follow-up    Clinic Care Coordination - Post Hospital       Most Recent Admission Date: 5/15/2025   Most Recent Admission Diagnosis: Generalized abdominal pain - R10.84  Transaminitis - R74.01  Hyperglycemia - R73.9  ANGELINA (acute kidney injury) - N17.9     Most Recent Discharge Date: 5/24/2025   Most Recent Discharge Diagnosis: Generalized abdominal pain - R10.84  Hyperglycemia - R73.9  ANGELINA (acute kidney injury) - N17.9  Transaminitis - R74.01  Gastroparesis - K31.84  Encounter for feeding tube placement - Z46.59  Nausea - R11.0  Exocrine pancreatic insufficiency - K86.81  Type 1 diabetes mellitus with hypoglycemia and without coma (H) - E10.649  Major depressive disorder, recurrent episode, moderate (H) - F33.1  Hypothyroidism, unspecified type - E03.9  Left knee pain, unspecified chronicity - M25.562  Chronic, continuous use of opioids - F11.90  H. pylori infection - A04.8  Chronic constipation - K59.09  Constipation - K59.00  Dislodged gastrostomy tube - T85.528A  RUQ abdominal pain - R10.11  Migraine, unspecified, not intractable, without status migrainosus - G43.909  Primary insomnia - F51.01  Constipation, unspecified constipation type - K59.00  Chronic back pain, unspecified back location, unspecified back pain laterality - M54.9, G89.29  Physical deconditioning - R53.81  Abdominal bloating - R14.0  Obstipation - K59.00  Intractable chronic migraine without aura and without status migrainosus - G43.719  On tube feeding diet - Z78.9     Transitions of Care Assessment    Discharge Assessment  How are you doing now that you are home?: RN called and spoke with patient. Pt shares she is doing okay- still has pain in back and stomach. RN asked if pt is constipated still and pt states this has been resolved. Pt shares she now has swelling through her legs. RN offered sooner appt with  PCP and pt declined, stating would rather see Dr. Sahu at [facility] versus another provider [facility] Pt is going to call [facility] to see if there is something available. Pt states she had temp of 99.5 via home care nurse yesterday, monitoring this and has not increased. Home care comes again tuesday or wednesday. RN asked patient to call the clinic triage if temperature increases or symptoms worsen. Pt agrees with this plan. All recommended follow ups at discharge have been scheduled. Pt declined sooner appointments with any. Endo has been in contact.  How are your symptoms? (Red Flag symptoms escalate to triage hotline per guidelines): Unchanged  Do you know how to contact your clinic care team if you have future questions or changes to your health status? : Yes  Does the patient have their discharge instructions? : Yes  Does the patient have questions regarding their discharge instructions? : No  Were you started on any new medications or were there changes to any of your previous medications? : Yes  Does the patient have all of their medications?: Yes  Do you have questions regarding any of your medications? : No  Do you have all of your needed medical supplies or equipment (DME)?  (i.e. oxygen tank, CPAP, cane, etc.): Yes              Care Management       Care Mgmt Encounter Assessment  Preventative Care  Routine Health maintenance Reviewed: Up to date  Clinic Utilization  Difficulty keeping appointments:: Yes  Compliance Concerns: Yes  No-Show Concerns: No  No PCP office visit in Past Year: No  Transportation  Transportation means:: Regular car, Family     Primary Diagnosis  Primary Diagnosis: Diabetes  Barriers in Communication  Other concerns: None  Pain  Pain (GOAL):: Yes  Type: Acute on Chronic  Location of chronic pain:: stomach, back  Radiating: Yes  Location pain radiates to: generalized body  Progression: Worsening  Description of pain: Aching  Chronic pain severity:: 5  Limitation of routine  activities due to chronic pain:: Yes  Description: Able to do moderate activities  Alleviating Factors: Pain Medication  Aggravating Factors: Activity  Medication Review  Medication adherence problem (GOAL):: No  Knowledgeable about how to use meds:: Yes  Medication side effects suspected:: No  Diet/Exercise/Sleep  Diet:: Diabetic diet  Inadequate nutrition (GOAL):: No  Tube Feeding: Yes  Tube Feeding: G-tube  Inadequate activity/exercise (GOAL):: No  Significant changes in sleep pattern (GOAL): No    Follow up Plan     Discharge Follow-Up  Discharge follow up appointment scheduled in alignment with recommended follow up timeframe or Transitions of Risk Category? (Low = within 30 days; Moderate= within 14 days; High= within 7 days): Yes  Discharge Follow Up Appointment Date: 06/09/25  Discharge Follow Up Appointment Scheduled with?: Primary Care Provider    Future Appointments   Date Time Provider Department Center   6/9/2025  3:00 PM Ron Morgan MD [facility]   6/10/2025  1:45 PM Evan Kapadia MD [facility]   6/12/2025  1:30 PM Allie Pleitez RN [facility]   6/18/2025 11:30 AM Joey Feldman PA-C [facility]   8/21/2025  1:40 PM Amena Maher MD [facility]       Outpatient Plan as outlined on AVS reviewed with patient.      For any urgent concerns, please contact our 24 hour nurse triage line: [phone]       MARCY LOVING RN

## 2025-05-29 NOTE — TELEPHONE ENCOUNTER
[facility] Call Center    Phone Message    May a detailed message be left on voicemail: yes     Reason for Call: Other: Requesting home care orders for:   Skilled nursing  One time a week for week one   Two times a week for two weeks  Weekly for two weeks  Also requesting physical therapy, occupational therapy and a  for evaluation.    Action Taken: Other: PCC    Travel Screening: Not Applicable     Date of Service:

## 2025-05-29 NOTE — TELEPHONE ENCOUNTER
Spoke to Chantell today and asked her to go onto the [employer] care link site to upload her pump and CGM. I looked this morning and it stated she needs to do this. Chantell will call me back once she has done this, she states she has instructions from her [employer]  ( Sapna ) in an e-mail. I will check back tomorrow to see if she has been able to do this.  
S/P Right Knee replacement. DM. U/C.

## 2025-05-29 NOTE — TELEPHONE ENCOUNTER
Left detailed VM on confidential voicemail box for Samantha with [facility] with the following verbal orders: Skilled nursing: One time a week for week one, two times a week for two weeks, and weekly for two weeks. Also physical therapy, occupational therapy and a  for evaluation.  Yuliet CHAVEZ LPN  [facility] Primary [facility]

## 2025-05-29 NOTE — TELEPHONE ENCOUNTER
[facility] referral from: Transitions of Care (recent hospital discharge, TCU discharge, or ED visit)    [facility] referral outreach attempt #1 on May 29, 2025 at 12:15 PM      Outcome: Spoke with patient declined spoke with nurse when discharged    Use vbc for the carrier/Plan on the flowsheet          GLENYS Ryan   [phone]

## 2025-05-30 PROCEDURE — S9342 HIT ENTERAL PUMP DIEM: HCPCS

## 2025-05-31 PROCEDURE — S9342 HIT ENTERAL PUMP DIEM: HCPCS

## 2025-06-01 PROCEDURE — S9342 HIT ENTERAL PUMP DIEM: HCPCS

## 2025-06-02 PROCEDURE — S9342 HIT ENTERAL PUMP DIEM: HCPCS

## 2025-06-03 ENCOUNTER — TELEPHONE (OUTPATIENT)
Dept: INTERNAL MEDICINE | Facility: CLINIC | Age: 60
End: 2025-06-03
Payer: MEDICARE

## 2025-06-03 ENCOUNTER — DOCUMENTATION ONLY (OUTPATIENT)
Dept: INTERNAL MEDICINE | Facility: CLINIC | Age: 60
End: 2025-06-03
Payer: MEDICARE

## 2025-06-03 PROCEDURE — S9342 HIT ENTERAL PUMP DIEM: HCPCS

## 2025-06-03 NOTE — TELEPHONE ENCOUNTER
[facility] Call Center    Phone Message    May a detailed message be left on voicemail: yes     Reason for Call: Other: Patient would reena a call back from care team to discuss increased pain, unstable BS, insomia      Action Taken: Other: pcc    Travel Screening: Not Applicable     Date of Service:

## 2025-06-03 NOTE — TELEPHONE ENCOUNTER
Spoke with Mahsa with [facility] and gave the following verbal order(s): PT - 1X wk for 4 Wks.  Yuliet CHAVEZ LPN  [facility]

## 2025-06-03 NOTE — TELEPHONE ENCOUNTER
Called patient and lvm- needs a visit or needs to talk to triage/ go to ER if having worsening abd pain. Furthermore per Raven's note- unsure if she is planning to follow with our clinic or [place].     Jose Sales RN on 6/3/2025 at 3:48 PM

## 2025-06-03 NOTE — PROGRESS NOTES
Type of Form Received: [facility] [id number]    Form Received (Date) 5/30/25   Form Filled out No   Placed in provider folder Yes

## 2025-06-03 NOTE — TELEPHONE ENCOUNTER
[facility] Call Center    Phone Message    May a detailed message be left on voicemail: yes     Reason for Call: Order(s): Other:   Reason for requested: Verbal orders request - PT - 1X wk for 4 Wks    Date needed: asap  Provider name: Eddie    Action Taken: Other: pcc    Travel Screening: Not Applicable     Date of Service:

## 2025-06-04 PROCEDURE — S9342 HIT ENTERAL PUMP DIEM: HCPCS

## 2025-06-05 ENCOUNTER — TELEPHONE (OUTPATIENT)
Dept: EDUCATION SERVICES | Facility: CLINIC | Age: 60
End: 2025-06-05

## 2025-06-05 ENCOUNTER — TELEPHONE (OUTPATIENT)
Dept: INTERNAL MEDICINE | Facility: CLINIC | Age: 60
End: 2025-06-05

## 2025-06-05 ENCOUNTER — VIRTUAL VISIT (OUTPATIENT)
Dept: EDUCATION SERVICES | Facility: CLINIC | Age: 60
End: 2025-06-05
Payer: MEDICARE

## 2025-06-05 DIAGNOSIS — E13.9 POST-PANCREATECTOMY DIABETES (H): Primary | ICD-10-CM

## 2025-06-05 DIAGNOSIS — Z90.410 POST-PANCREATECTOMY DIABETES (H): Primary | ICD-10-CM

## 2025-06-05 DIAGNOSIS — E89.1 POST-PANCREATECTOMY DIABETES (H): Primary | ICD-10-CM

## 2025-06-05 NOTE — PATIENT INSTRUCTIONS
PLAN:  Increase Lantus to 12 units in AM and 12 units in PM  Use Novolog correction scale 1 unit per 50>150 every 4 hours while awake  150-200= 1 unit  201-250=  2 units  251-300= 3 units  301-350=4 units  351-400= 5 units  Use Novolog correction at bedtime and during night of 1 unit per 50>200  200-250=1 unit  251-300= 2 units  301-350= 3 units  351-400=4 units  401-450=5 units  4. Continue NPH 8 units in AM   5. Restart sensor today  6. Contact [facility] at [phone] to check on G7 sensor order  7. Plan to  Dexcom G7 sensor at [facility] desk-1st floor at [facility] on Monday    FOLLOW-UP:    PB

## 2025-06-05 NOTE — LETTER
6/5/2025      Chantell Kidd  [address]  [place] [address]      Dear Colleague,    Thank you for referring your patient, Chantell Kidd, to the [facility]. Please see a copy of my visit note below.    Virtual Visit Details    Type of service:  Video Visit   Video Start Time: 8:05 AM  Video End Time:8:55 AM    Originating Location (pt. Location): Home    Distant Location (provider location):  Off-site  Platform used for Video Visit: [employer]    Diabetes Self-Management Education & Support    Chantell Kidd presents today for education related to Post Pancreatectomy Diabetes    Patient is being treated with:  MDI Insulin  She is accompanied by spouse    Year of diagnosis: 2012  Referring provider:  Dr. Maher  Living Situation: Home with spouse    PATIENT CONCERNS/REASON FOR REFERRAL   Eager to restart OP5    ASSESSMENT:    Taking Medication:     Current Diabetes Management per Patient:  Taking diabetes medications?   Lantus 10 units in AM and 10 units in PM  Novolog standard sliding scale, ICR 1 unit per 12 grams of CHO's   NPH 8 units in the AM    Monitoring  Patient glucose self monitoring as follows: Every 4-6 hours per day  BG results: 500's-high    Patient's most recent   Lab Results   Component Value Date    A1C 15.8 04/30/2025    A1C 7.3 11/09/2020      Patient's A1C goal: <8.0    Activity: no regular exercise program    Healthy Eating:  Tube feeding continuously=45 grams per 11oz carton (4 per day)  Breakfast: Scrambled eggs, toast  Lunch: Yogurt, pudding  Dinner: Soup, ice cream   Water throughout day, crystal light, ginger ale, 7up, coffee with creamer     Problem Solving:    Patient is at risk of hypoglycemia?: YES, hypoglycemia unawareness  Hospitalizations for hyper or hypoglycemia: Yes (Please explain): DKA    Healthy Coping and Stress Management:   Sources of stress identified by patient:   Coping mechanisms identified by patient:  Other (please specify):  Not  assessed      EDUCATION and INSTRUCTION PROVIDED AT THIS VISIT:    S/P Pancreatectomy seen to review glucose after recent admission with ANGELINA. She is accompanied by spouse, Yannick. Current regimen: Lantus 10 units in AM and 10 units in PM, NPH 8 units in AM, and Novolog ICR 1:12 + standard correction. She has been checking glucose fasting throughout day, all readings >500. She is restarting G7 today. Weight as of today=94.5#. She is having difficulty keeping food down, vomits within short time up to one hour after eating. HC Nurse aware, follow up scheduled with Dr. Morgan Monday. Patient is eager to restart OP5 however current POD's are for G6. She has not been on pump for approximately 4-5 months while in [place]; patient and  report they managed pump well and did not have concerns. Based on uncontrolled glucose I do think it would be beneficial to restart OP5. If Dr. Maher is agreeable I plan to recalculate pump settings using TDD and weight average and will provide starter kit in clinic. For now, I have asked her to increase Lantus to 12/12 and use standard Novolog correction every 4 hours while awake. Will follow up with patient via phone after discussing with Dr. Maher.     Of note: PODS/vial to [facility]    Patient-stated goal written and given to Chantell Kidd.  Verbalized and demonstrated understanding of instructions.   See patient instructions  AVS printed and given to patient-via My Chart    PLAN:  Increase Lantus to 12 units in AM and 12 units in PM  Use Novolog correction scale 1 unit per 50>150 every 4 hours while awake  150-200= 1 unit  201-250=  2 units  251-300= 3 units  301-350=4 units  351-400= 5 units  Use Novolog correction at bedtime and during night of 1 unit per 50>200  200-250=1 unit  251-300= 2 units  301-350= 3 units  351-400=4 units  401-450=5 units  4. Continue NPH 8 units in AM   5. Restart sensor today  6. Contact [facility] at [phone] to check on G7  sensor order  7. Plan to  Dexcom G7 sensor at  desk-1st floor at [facility] on Monday    FOLLOW-UP:    TBD    Time spent with patient at today's visit was 48 minutes.      Libby Jay RN, [facility]  Diabetes Education Department  [facility], [place]    Any diabetes medication initiation or dose changes were made via the [facility] Standing Orders per the patient's referring provider. A copy of this encounter was shared with the provider-Ushain        Again, thank you for allowing me to participate in the care of your patient.        Sincerely,        Libby Jay RN    Electronically signed

## 2025-06-05 NOTE — TELEPHONE ENCOUNTER
Left detailed VM on confidential voicemail box for Honey STEVE with [facility] with the following verbal order(s): social work home care - 1 new eval within 21 days to assist patient with accessing community resources.  Yuliet CHAVEZ LPN  [facility]

## 2025-06-05 NOTE — TELEPHONE ENCOUNTER
[facility] Call Center    Phone Message    May a detailed message be left on voicemail: yes     Reason for Call: Order(s): Other:   Reason for requested: Verbal orders request - social work home care - 1 new eval within 21 days to assist patient with accessing community resources. There is currently an order in place but it is only for this week.  New order required  Date needed: asp  Provider name: Eddie    Action Taken: Other: pcc    Travel Screening: Not Applicable     Date of Service:

## 2025-06-05 NOTE — PROGRESS NOTES
Virtual Visit Details    Type of service:  Video Visit   Video Start Time: 8:05 AM  Video End Time:8:55 AM    Originating Location (pt. Location): Home    Distant Location (provider location):  Off-site  Platform used for Video Visit: [employer]    Diabetes Self-Management Education & Support    Chantell Kidd presents today for education related to Post Pancreatectomy Diabetes    Patient is being treated with:  MDI Insulin  She is accompanied by spouse    Year of diagnosis: 2012  Referring provider:  Dr. Maher  Living Situation: Home with spouse    PATIENT CONCERNS/REASON FOR REFERRAL   Eager to restart OP5    ASSESSMENT:    Taking Medication:     Current Diabetes Management per Patient:  Taking diabetes medications?   Lantus 10 units in AM and 10 units in PM  Novolog standard sliding scale, ICR 1 unit per 12 grams of CHO's   NPH 8 units in the AM    Monitoring  Patient glucose self monitoring as follows: Every 4-6 hours per day  BG results: 500's-high    Patient's most recent   Lab Results   Component Value Date    A1C 15.8 04/30/2025    A1C 7.3 11/09/2020      Patient's A1C goal: <8.0    Activity: no regular exercise program    Healthy Eating:  Tube feeding continuously=45 grams per 11oz carton (4 per day)  Breakfast: Scrambled eggs, toast  Lunch: Yogurt, pudding  Dinner: Soup, ice cream   Water throughout day, crystal light, ginger ale, 7up, coffee with creamer     Problem Solving:    Patient is at risk of hypoglycemia?: YES, hypoglycemia unawareness  Hospitalizations for hyper or hypoglycemia: Yes (Please explain): DKA    Healthy Coping and Stress Management:   Sources of stress identified by patient:   Coping mechanisms identified by patient:  Other (please specify):  Not assessed      EDUCATION and INSTRUCTION PROVIDED AT THIS VISIT:    S/P Pancreatectomy seen to review glucose after recent admission with ANGELINA. She is accompanied by spouse, Yannick. Current regimen: Lantus 10 units in AM and 10 units in PM, NPH 8 units  in AM, and Novolog ICR 1:12 + standard correction. She has been checking glucose fasting throughout day, all readings >500. She is restarting G7 today. Weight as of today=94.5#. She is having difficulty keeping food down, vomits within short time up to one hour after eating. HC Nurse aware, follow up scheduled with Dr. Morgan Monday. Patient is eager to restart OP5 however current POD's are for G6. She has not been on pump for approximately 4-5 months while in [place]; patient and  report they managed pump well and did not have concerns. Based on uncontrolled glucose I do think it would be beneficial to restart OP5. If Dr. Maher is agreeable I plan to recalculate pump settings using TDD and weight average and will provide starter kit in clinic. For now, I have asked her to increase Lantus to 12/12 and use standard Novolog correction every 4 hours while awake. Will follow up with patient via phone after discussing with Dr. Maher.     Of note: PODS/vial to [facility]    Patient-stated goal written and given to Chantell Kidd.  Verbalized and demonstrated understanding of instructions.   See patient instructions  AVS printed and given to patient-via My Chart    PLAN:  Increase Lantus to 12 units in AM and 12 units in PM  Use Novolog correction scale 1 unit per 50>150 every 4 hours while awake  150-200= 1 unit  201-250=  2 units  251-300= 3 units  301-350=4 units  351-400= 5 units  Use Novolog correction at bedtime and during night of 1 unit per 50>200  200-250=1 unit  251-300= 2 units  301-350= 3 units  351-400=4 units  401-450=5 units  4. Continue NPH 8 units in AM   5. Restart sensor today  6. Contact [facility] at [phone] to check on G7 sensor order  7. Plan to  Dexcom G7 sensor at [facility] desk-1st floor at [facility] on Monday    FOLLOW-UP:    TBD    Time spent with patient at today's visit was 48 minutes.      Libby Jay RN, [facility]  Diabetes Education Department  [facility] [place] [facility], [place]    Any diabetes medication initiation or dose changes were made via the [facility] Standing Orders per the patient's referring provider. A copy of this encounter was shared with the provider-[place]ar

## 2025-06-05 NOTE — TELEPHONE ENCOUNTER
Spoke with staff at [employer] and they will be refaxing documents.     Kathleen Conde RN Transplant Coordinator on June 5, 2025 1:46 PM

## 2025-06-05 NOTE — TELEPHONE ENCOUNTER
Left message for patient to return call. OK to proceed with restarting OP5.     CDE will arrange appointment (add on) with patient.    Libby Jay RN, [facility]  Diabetes Education Department  [facility], [place]  Phone: [phone]  [phone]

## 2025-06-09 ENCOUNTER — LAB (OUTPATIENT)
Dept: LAB | Facility: CLINIC | Age: 60
End: 2025-06-09
Payer: MEDICARE

## 2025-06-09 ENCOUNTER — DOCUMENTATION ONLY (OUTPATIENT)
Dept: INTERNAL MEDICINE | Facility: CLINIC | Age: 60
End: 2025-06-09

## 2025-06-09 ENCOUNTER — OFFICE VISIT (OUTPATIENT)
Dept: INTERNAL MEDICINE | Facility: CLINIC | Age: 60
End: 2025-06-09
Payer: MEDICARE

## 2025-06-09 VITALS
TEMPERATURE: 97.1 F | SYSTOLIC BLOOD PRESSURE: 152 MMHG | BODY MASS INDEX: 18.38 KG/M2 | HEART RATE: 95 BPM | RESPIRATION RATE: 16 BRPM | DIASTOLIC BLOOD PRESSURE: 81 MMHG | OXYGEN SATURATION: 99 % | WEIGHT: 100.5 LBS

## 2025-06-09 DIAGNOSIS — E10.649 TYPE 1 DIABETES MELLITUS WITH HYPOGLYCEMIA AND WITHOUT COMA (H): ICD-10-CM

## 2025-06-09 DIAGNOSIS — Z00.00 ROUTINE HEALTH MAINTENANCE: ICD-10-CM

## 2025-06-09 DIAGNOSIS — E03.9 HYPOTHYROIDISM, UNSPECIFIED TYPE: ICD-10-CM

## 2025-06-09 DIAGNOSIS — R10.84 ABDOMINAL PAIN, GENERALIZED: ICD-10-CM

## 2025-06-09 DIAGNOSIS — E89.1 POST-PANCREATECTOMY DIABETES (H): ICD-10-CM

## 2025-06-09 DIAGNOSIS — E13.9 POST-PANCREATECTOMY DIABETES (H): ICD-10-CM

## 2025-06-09 DIAGNOSIS — R97.0 ELEVATED CEA: Primary | ICD-10-CM

## 2025-06-09 DIAGNOSIS — R97.0 ELEVATED CEA: ICD-10-CM

## 2025-06-09 DIAGNOSIS — K86.81 EXOCRINE PANCREATIC INSUFFICIENCY: ICD-10-CM

## 2025-06-09 DIAGNOSIS — Z90.410 POST-PANCREATECTOMY DIABETES (H): ICD-10-CM

## 2025-06-09 DIAGNOSIS — K59.00 CONSTIPATION, UNSPECIFIED CONSTIPATION TYPE: ICD-10-CM

## 2025-06-09 LAB
ALBUMIN SERPL BCG-MCNC: 4.2 G/DL (ref 3.5–5.2)
ALP SERPL-CCNC: 214 U/L (ref 40–150)
ALT SERPL W P-5'-P-CCNC: 340 U/L (ref 0–50)
ANION GAP SERPL CALCULATED.3IONS-SCNC: 18 MMOL/L (ref 7–15)
AST SERPL W P-5'-P-CCNC: 224 U/L (ref 0–45)
BASOPHILS # BLD AUTO: 0 10E3/UL (ref 0–0.2)
BASOPHILS NFR BLD AUTO: 1 %
BILIRUB SERPL-MCNC: 0.2 MG/DL
BUN SERPL-MCNC: 5.4 MG/DL (ref 8–23)
CALCIUM SERPL-MCNC: 9.4 MG/DL (ref 8.8–10.4)
CEA SERPL-MCNC: 11.3 NG/ML
CHLORIDE SERPL-SCNC: 88 MMOL/L (ref 98–107)
CREAT SERPL-MCNC: 0.48 MG/DL (ref 0.51–0.95)
CYSTATIN C (ROCHE): 0.5 MG/L (ref 0.6–1)
EGFRCR SERPLBLD CKD-EPI 2021: >90 ML/MIN/1.73M2
EOSINOPHIL # BLD AUTO: 0.1 10E3/UL (ref 0–0.7)
EOSINOPHIL NFR BLD AUTO: 1 %
ERYTHROCYTE [DISTWIDTH] IN BLOOD BY AUTOMATED COUNT: 11.5 % (ref 10–15)
GFR/BSA.PRED SERPLBLD CYS-BASED-ARV: >90 ML/MIN/1.73M2
GLUCOSE SERPL-MCNC: 783 MG/DL (ref 70–99)
HCO3 SERPL-SCNC: 21 MMOL/L (ref 22–29)
HCT VFR BLD AUTO: 37 % (ref 35–47)
HGB BLD-MCNC: 12.8 G/DL (ref 11.7–15.7)
IMM GRANULOCYTES # BLD: 0 10E3/UL
IMM GRANULOCYTES NFR BLD: 0 %
LYMPHOCYTES # BLD AUTO: 1.4 10E3/UL (ref 0.8–5.3)
LYMPHOCYTES NFR BLD AUTO: 23 %
MCH RBC QN AUTO: 31.4 PG (ref 26.5–33)
MCHC RBC AUTO-ENTMCNC: 34.6 G/DL (ref 31.5–36.5)
MCV RBC AUTO: 91 FL (ref 78–100)
MONOCYTES # BLD AUTO: 0.5 10E3/UL (ref 0–1.3)
MONOCYTES NFR BLD AUTO: 8 %
NEUTROPHILS # BLD AUTO: 4.2 10E3/UL (ref 1.6–8.3)
NEUTROPHILS NFR BLD AUTO: 67 %
NRBC # BLD AUTO: 0 10E3/UL
NRBC BLD AUTO-RTO: 0 /100
PLATELET # BLD AUTO: 422 10E3/UL (ref 150–450)
POTASSIUM SERPL-SCNC: 4.6 MMOL/L (ref 3.4–5.3)
PROT SERPL-MCNC: 7.2 G/DL (ref 6.4–8.3)
RBC # BLD AUTO: 4.07 10E6/UL (ref 3.8–5.2)
SODIUM SERPL-SCNC: 127 MMOL/L (ref 135–145)
T4 FREE SERPL-MCNC: 1.02 NG/DL (ref 0.9–1.7)
TSH SERPL DL<=0.005 MIU/L-ACNC: 4.94 UIU/ML (ref 0.3–4.2)
WBC # BLD AUTO: 6.2 10E3/UL (ref 4–11)

## 2025-06-09 PROCEDURE — 3079F DIAST BP 80-89 MM HG: CPT | Performed by: INTERNAL MEDICINE

## 2025-06-09 PROCEDURE — 99215 OFFICE O/P EST HI 40 MIN: CPT | Mod: 25 | Performed by: INTERNAL MEDICINE

## 2025-06-09 PROCEDURE — 99000 SPECIMEN HANDLING OFFICE-LAB: CPT | Performed by: PATHOLOGY

## 2025-06-09 PROCEDURE — 80053 COMPREHEN METABOLIC PANEL: CPT | Performed by: PATHOLOGY

## 2025-06-09 PROCEDURE — 82610 CYSTATIN C: CPT | Performed by: PEDIATRICS

## 2025-06-09 PROCEDURE — G0009 ADMIN PNEUMOCOCCAL VACCINE: HCPCS | Performed by: INTERNAL MEDICINE

## 2025-06-09 PROCEDURE — 3077F SYST BP >= 140 MM HG: CPT | Performed by: INTERNAL MEDICINE

## 2025-06-09 PROCEDURE — 84439 ASSAY OF FREE THYROXINE: CPT | Performed by: PATHOLOGY

## 2025-06-09 PROCEDURE — 90677 PCV20 VACCINE IM: CPT | Performed by: INTERNAL MEDICINE

## 2025-06-09 PROCEDURE — 36415 COLL VENOUS BLD VENIPUNCTURE: CPT | Performed by: PATHOLOGY

## 2025-06-09 PROCEDURE — 85025 COMPLETE CBC W/AUTO DIFF WBC: CPT | Performed by: PATHOLOGY

## 2025-06-09 PROCEDURE — 84443 ASSAY THYROID STIM HORMONE: CPT | Performed by: PATHOLOGY

## 2025-06-09 PROCEDURE — 82378 CARCINOEMBRYONIC ANTIGEN: CPT | Performed by: PEDIATRICS

## 2025-06-09 RX ORDER — DRONABINOL 2.5 MG/1
2.5 CAPSULE ORAL 2 TIMES DAILY PRN
Qty: 60 CAPSULE | Refills: 1 | Status: SHIPPED | OUTPATIENT
Start: 2025-06-09

## 2025-06-09 NOTE — TELEPHONE ENCOUNTER
Left message for patient to return call.     Hold placed 6/11 at 1:30.     Libby Jay RN, [facility]  Diabetes Education Department  [facility], [place]

## 2025-06-09 NOTE — PROGRESS NOTES
Type of Form Received: [facility] [id number]    Form Received (Date) 6/6/25   Form Filled out Yes, faxed 6/11   Placed in provider folder Yes

## 2025-06-09 NOTE — PROGRESS NOTES
HPI  59-year-old presents today to reestablish primary care and for hospital follow-up and with chronic abdominal pain.  She has not felt well for over a year by her report.  She was in [place] for a period of time found to have an elevated CEA level at that point recommended follow-up but she returned to [place].  CEA has remained elevated.  She has had ongoing issues with her chronic diffuse abdominal pain but this appears to have increased in severity and intensity recently by her report.  She also has chronic constipation and is taking Linzess and additional laxatives for control of this.  She has ongoing anorexia nausea malaise and has noted significant hair loss recently.  She was using oxycodone for abdominal pain and we discouraged this given her issues with constipation and the chronic nature of her pain.  Does have an upcoming pain clinic consultation scheduled.  Her diabetes is being managed with a CGM and she is followed by endocrinology regarding this.  Most recent TSHs have been elevated with suppressed T4.  She did undergo an increase in the dose of her levothyroxine a couple of months ago and is due to have this reassessed.  Past Medical History:   Diagnosis Date    Chronic abdominal pain     Chronic pancreatitis (H)     S/P pancreatectomy    Depression with anxiety     Gastro-oesophageal reflux disease     Gastroparesis 05/13/2025    Hypothyroidism 04/23/2015    Kidney stones     Low serum cortisol level     Migraines     Other chronic pain     STOMACH    Other chronic pain     LUMBAR SPINE    Peripheral neuropathy     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    TPIAT    Spasm of sphincter of Oddi      Past Surgical History:   Procedure Laterality Date    ARTHROPLASTY CARPOMETACARPAL (THUMB JOINT)  05/02/2014    Procedure: ARTHROPLASTY CARPOMETACARPAL (THUMB JOINT);  Surgeon: Carina Panda MD;  Location: [facility] OR    CHOLECYSTECTOMY  01/01/2004    COLONOSCOPY  07/18/2014    Procedure:  COLONOSCOPY;  Surgeon: Aurora Sahu MD;  [facility]    COLONOSCOPY N/A 08/01/2017    Procedure: COLONOSCOPY;  Colonoscopy and upper endoscopy;  Surgeon: Deirdre Harris MD;  [facility]    ENDOSCOPIC INSERTION TUBE JEJUNOSTOMY N/A 5/2/2025    Procedure: Esophagogastroduodenoscopy, Jejunopexy, JEJUNOSTOMY TUBE PLACEMENT, GASTROSTOMY TUBE EXCHANGE;  Surgeon: Jose Francisco Perdomo MD;  [facility]    ENDOSCOPIC RETROGRADE CHOLANGIOPANCREATOGRAM      ENDOSCOPIC RETROGRADE CHOLANGIOPANCREATOGRAM  04/19/2011    Procedure:ENDOSCOPIC RETROGRADE CHOLANGIOPANCREATOGRAM; Pancreatic Stent Placement      ENDOSCOPIC RETROGRADE CHOLANGIOPANCREATOGRAM  05/26/2011    Procedure:ENDOSCOPIC RETROGRADE CHOLANGIOPANCREATOGRAM; with Pancreatic Stent Removal; Surgeon:DALE MIMS; Location:[facility] OR    ENDOSCOPY UPPER, COLONOSCOPY, COMBINED  04/25/2012    Procedure:COMBINED ENDOSCOPY UPPER, COLONOSCOPY; Enteroscopy with Bile Duct Stent Removal, Colonoscopy  *Latex Safe Room*; Surgeon:GRACY GODWINIQ; Location:[facility] OR    ESOPHAGOSCOPY, GASTROSCOPY, DUODENOSCOPY (EGD), COMBINED  05/26/2011    Procedure:COMBINED ESOPHAGOSCOPY, GASTROSCOPY, DUODENOSCOPY (EGD); Surgeon:DALE MIMS; Location[facility]    ESOPHAGOSCOPY, GASTROSCOPY, DUODENOSCOPY (EGD), COMBINED N/A 10/30/2014    Procedure: COMBINED ESOPHAGOSCOPY, GASTROSCOPY, DUODENOSCOPY (EGD), BIOPSY SINGLE OR MULTIPLE;  Surgeon: Sarai Moon MD;  Location:  GI    ESOPHAGOSCOPY, GASTROSCOPY, DUODENOSCOPY (EGD), COMBINED Left 07/06/2015    Procedure: COMBINED ESOPHAGOSCOPY, GASTROSCOPY, DUODENOSCOPY (EGD), BIOPSY SINGLE OR MULTIPLE;  Surgeon: Thomas Estrada MD;  [facility]    ESOPHAGOSCOPY, GASTROSCOPY, DUODENOSCOPY (EGD), COMBINED N/A 07/08/2016    Procedure: COMBINED ESOPHAGOSCOPY, GASTROSCOPY, DUODENOSCOPY (EGD), BIOPSY SINGLE OR MULTIPLE;  Surgeon: Eloy Klein MD;  [facility]     ESOPHAGOSCOPY, GASTROSCOPY, DUODENOSCOPY (EGD), COMBINED N/A 08/04/2016    Procedure: COMBINED ESOPHAGOSCOPY, GASTROSCOPY, DUODENOSCOPY (EGD), BIOPSY SINGLE OR MULTIPLE;  Surgeon: Jason Brown MD;  [facility]    ESOPHAGOSCOPY, GASTROSCOPY, DUODENOSCOPY (EGD), COMBINED N/A 08/01/2017    Procedure: COMBINED ESOPHAGOSCOPY, GASTROSCOPY, DUODENOSCOPY (EGD);;  Surgeon: Deirdre Harris MD;  [facility]    ESOPHAGOSCOPY, GASTROSCOPY, DUODENOSCOPY (EGD), COMBINED N/A 06/12/2019    Procedure: ESOPHAGOGASTRODUODENOSCOPY (EGD);  Surgeon: Jose Francisco Perdomo MD;  [facility]    GYN SURGERY      Hysterectomy and USO    [facility] ENDOSCOPY W EUS  07/20/2011    Procedure:COMBINED ENDOSCOPIC ULTRASOUND, ESOPHAGOSCOPY, GASTROSCOPY, DUODENOSCOPY (EGD); Surgeon:DARVIN DONOHUE; [facility]    HERNIORRHAPHY VENTRAL N/A 09/15/2016    Procedure: HERNIORRHAPHY VENTRAL;  Surgeon: Juanita Bernabe MD;  [facility]    HYSTERECTOMY  1997 or 1998    USO    INCISION AND DRAINAGE ABDOMEN WASHOUT, COMBINED  08/16/2012    Procedure: COMBINED INCISION AND DRAINAGE ABDOMEN WASHOUT;  ,debridement and Drainage Post Appendectomy;  Surgeon: Ron Austin MD;  [facility]    INJECT TRANSVERSUS ABDOMINIS PLANE (TAP) BLOCK BILATERAL Bilateral 05/26/2016    Procedure: INJECT TRANSVERSUS ABDOMINIS PLANE (TAP) BLOCK BILATERAL;  Surgeon: Leonard Mccallum MD;  [facility]    IR NG TUBE PLACEMENT REQ RAD & FLUORO  12/04/2017    LAPAROSCOPIC APPENDECTOMY  07/30/2012    Procedure: LAPAROSCOPIC APPENDECTOMY;  Open Appendectomy;  Surgeon: Ron Austin MD;  [facility]    PANCREATECTOMY, TRANSPLANT AUTO ISLET CELL, COMBINED  01/06/2012    Procedure:COMBINED PANCREATECTOMY, TRANSPLANT AUTO ISLET CELL; Total  Pancreatectomy, Auto Islet Transplant, splenectomy, 18fr. transgastric-jejunal feeding tube placement, liver biopsy; Surgeon:PALAK LEE; Location:[facility]    PICC DOUBLE LUMEN  PLACEMENT Right 04/19/2025    5FR DL PICC, brachial medial vein. L-38cm, 3cm out.    PICC INSERTION - DOUBLE LUMEN Right 04/30/2025    39-3cm, Medial brachial vein    REPLACE GASTROSTOMY TUBE, PERCUTANEOUS N/A 08/30/2017    Procedure: REPLACE GASTROSTOMY TUBE, PERCUTANEOUS;  GJ Tube Change;  Surgeon: Jose Nath PA-C;  [facility]    SPLENECTOMY       Family History   Problem Relation Age of Onset    Hypertension Mother     Diabetes Mother     Osteoporosis Mother     Cancer Father         pancreatic cancer    Diabetes Maternal Grandmother     Cardiovascular Maternal Grandmother     Cancer Maternal Grandfather         lung cancer    Cancer Sister         brain    Cancer Sister         liver cancer         Exam:  BP (!) 152/81 (BP Location: Left arm, Patient Position: Sitting, Cuff Size: Adult Small)   Pulse 95   Temp 97.1  F (36.2  C) (Skin)   Resp 16   Wt 45.6 kg (100 lb 8 oz)   SpO2 99%   BMI 18.38 kg/m    100 lbs 8 oz  PHYSICAL EXAMINATION:   The patient is alert, oriented with a clear sensorium.   Skin shows no lesions or rashes and good turgor.   Head is normocephalic and atraumatic.   Eyes show PERRLA.   Neck shows no nodes, thyromegaly    Lungs are clear to percussion and auscultation.   Heart shows normal S1 and S2 without murmur or gallop.   Abdomen is soft, diffusely tender without masses or organomegaly.  G-tube in the midepigastrium J-tube in the left lower quadrant appear to be functioning normally  Extremities show no edema and no evidence of active synovitis.   Neurologic examination shows cranial nerves II-XII intact.  Reflexes are full and symmetrical.     Labs reviewed:  Results for orders placed or performed in visit on 06/09/25   Comprehensive metabolic panel     Status: Abnormal   Result Value Ref Range    Sodium 127 (L) 135 - 145 mmol/L    Potassium 4.6 3.4 - 5.3 mmol/L    Carbon Dioxide (CO2) 21 (L) 22 - 29 mmol/L    Anion Gap 18 (H) 7 - 15 mmol/L    Urea Nitrogen 5.4  (L) 8.0 - 23.0 mg/dL    Creatinine 0.48 (L) 0.51 - 0.95 mg/dL    GFR Estimate >90 >60 mL/min/1.73m2    Calcium 9.4 8.8 - 10.4 mg/dL    Chloride 88 (L) 98 - 107 mmol/L    Glucose 783 (HH) 70 - 99 mg/dL    Alkaline Phosphatase 214 (H) 40 - 150 U/L     (H) 0 - 45 U/L     (H) 0 - 50 U/L    Protein Total 7.2 6.4 - 8.3 g/dL    Albumin 4.2 3.5 - 5.2 g/dL    Bilirubin Total 0.2 <=1.2 mg/dL   Cystatin C with GFR     Status: Abnormal   Result Value Ref Range    Cystatin C 0.5 (L) 0.6 - 1.0 mg/L    GFR Calculated with Cystatin C >90 >=60 mL/min/1.73m2    Narrative    eGFRcys in adults is calculated using the 2012 CKD-EPI cystatin c equation which includes age and gender (Corky et al., [facility], DOI: 10.1056/ADUUuf2207399)   TSH with free T4 reflex     Status: Abnormal   Result Value Ref Range    TSH 4.94 (H) 0.30 - 4.20 uIU/mL   CEA     Status: None   Result Value Ref Range    CEA 11.3 ng/mL   CBC with platelets and differential     Status: None   Result Value Ref Range    WBC Count 6.2 4.0 - 11.0 10e3/uL    RBC Count 4.07 3.80 - 5.20 10e6/uL    Hemoglobin 12.8 11.7 - 15.7 g/dL    Hematocrit 37.0 35.0 - 47.0 %    MCV 91 78 - 100 fL    MCH 31.4 26.5 - 33.0 pg    MCHC 34.6 31.5 - 36.5 g/dL    RDW 11.5 10.0 - 15.0 %    Platelet Count 422 150 - 450 10e3/uL    % Neutrophils 67 %    % Lymphocytes 23 %    % Monocytes 8 %    % Eosinophils 1 %    % Basophils 1 %    % Immature Granulocytes 0 %    NRBCs per 100 WBC 0 <1 /100    Absolute Neutrophils 4.2 1.6 - 8.3 10e3/uL    Absolute Lymphocytes 1.4 0.8 - 5.3 10e3/uL    Absolute Monocytes 0.5 0.0 - 1.3 10e3/uL    Absolute Eosinophils 0.1 0.0 - 0.7 10e3/uL    Absolute Basophils 0.0 0.0 - 0.2 10e3/uL    Absolute Immature Granulocytes 0.0 <=0.4 10e3/uL    Absolute NRBCs 0.0 10e3/uL   T4 free     Status: Normal   Result Value Ref Range    Free T4 1.02 0.90 - 1.70 ng/dL   CBC with Platelets & Differential     Status: None    Narrative    The following orders were created for panel  order CBC with Platelets & Differential.  Procedure                               Abnormality         Status                     ---------                               -----------         ------                     CBC with platelets and ...[[id number]]                      Final result                 Please view results for these tests on the individual orders.         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  Will try her on Marinol for appetite and abdominal pain she has tried this before and apparently tolerated it well.  Await the results of the pain clinic consultation.  Will reassess her labs today update her immunizations with a Prevnar and have her follow-up for update and reassessment in a month.  Will increase her levothyroxine if indicated by a repeat thyroid function test.    7:30 PM phone call to her regarding her elevated blood sugar.  She reports that her CGM has been reading high all afternoon.  She has been pushing fluids.  She is just sitting down to eat dinner and was planning to take 5 units of NovoLog and 10 units additional because of the high.  Will give her an additional 10 units to this will increase her Lantus insulin to 20 units twice a day if she is still high in the morning with an additional 10 units of NovoLog in the morning she needs to go to the ER or immediately should she become increasingly symptomatic.    Over 40 minutes on the day of service spent in chart review, patient contact, record completion and review and  notification of lab reports    This note was completed using Dragon voice recognition software.      Ron Morgan MD  General Internal Medicine  [facility]  [phone]

## 2025-06-10 ENCOUNTER — MEDICAL CORRESPONDENCE (OUTPATIENT)
Dept: HEALTH INFORMATION MANAGEMENT | Facility: CLINIC | Age: 60
End: 2025-06-10

## 2025-06-10 ENCOUNTER — RESULTS FOLLOW-UP (OUTPATIENT)
Dept: INTERNAL MEDICINE | Facility: CLINIC | Age: 60
End: 2025-06-10

## 2025-06-10 DIAGNOSIS — E10.649 TYPE 1 DIABETES MELLITUS WITH HYPOGLYCEMIA AND WITHOUT COMA (H): Primary | ICD-10-CM

## 2025-06-10 DIAGNOSIS — R94.5 ABNORMAL RESULTS OF LIVER FUNCTION STUDIES: ICD-10-CM

## 2025-06-17 ENCOUNTER — DOCUMENTATION ONLY (OUTPATIENT)
Dept: INTERNAL MEDICINE | Facility: CLINIC | Age: 60
End: 2025-06-17

## 2025-06-17 ENCOUNTER — HOME INFUSION BILLING (OUTPATIENT)
Dept: HOME HEALTH SERVICES | Facility: HOME HEALTH | Age: 60
End: 2025-06-17
Payer: MEDICARE

## 2025-06-17 PROCEDURE — B9002 ENTER NUTR INF PUMP ANY TYPE: HCPCS | Mod: RR

## 2025-06-17 NOTE — PROGRESS NOTES
Type of Form Received: [facility] [id number]    Form Received (Date) 6/16/25   Form Filled out No   Placed in provider folder Yes

## 2025-06-18 ENCOUNTER — OFFICE VISIT (OUTPATIENT)
Dept: GASTROENTEROLOGY | Facility: CLINIC | Age: 60
End: 2025-06-18
Payer: MEDICARE

## 2025-06-18 ENCOUNTER — RESULTS FOLLOW-UP (OUTPATIENT)
Dept: INTERNAL MEDICINE | Facility: CLINIC | Age: 60
End: 2025-06-18

## 2025-06-18 ENCOUNTER — TELEPHONE (OUTPATIENT)
Dept: INTERNAL MEDICINE | Facility: CLINIC | Age: 60
End: 2025-06-18

## 2025-06-18 VITALS
OXYGEN SATURATION: 100 % | DIASTOLIC BLOOD PRESSURE: 79 MMHG | BODY MASS INDEX: 18.77 KG/M2 | SYSTOLIC BLOOD PRESSURE: 135 MMHG | WEIGHT: 102 LBS | HEART RATE: 95 BPM | HEIGHT: 62 IN

## 2025-06-18 DIAGNOSIS — Z90.410 POST-PANCREATECTOMY DIABETES (H): ICD-10-CM

## 2025-06-18 DIAGNOSIS — R94.5 ABNORMAL RESULTS OF LIVER FUNCTION STUDIES: ICD-10-CM

## 2025-06-18 DIAGNOSIS — R10.9 CHRONIC ABDOMINAL PAIN: ICD-10-CM

## 2025-06-18 DIAGNOSIS — K31.84 GASTROPARESIS: Primary | ICD-10-CM

## 2025-06-18 DIAGNOSIS — E10.649 TYPE 1 DIABETES MELLITUS WITH HYPOGLYCEMIA AND WITHOUT COMA (H): ICD-10-CM

## 2025-06-18 DIAGNOSIS — E89.1 POST-PANCREATECTOMY DIABETES (H): ICD-10-CM

## 2025-06-18 DIAGNOSIS — G89.29 CHRONIC ABDOMINAL PAIN: ICD-10-CM

## 2025-06-18 DIAGNOSIS — R74.01 TRANSAMINITIS: ICD-10-CM

## 2025-06-18 DIAGNOSIS — E13.9 POST-PANCREATECTOMY DIABETES (H): ICD-10-CM

## 2025-06-18 DIAGNOSIS — K86.81 EXOCRINE PANCREATIC INSUFFICIENCY: ICD-10-CM

## 2025-06-18 DIAGNOSIS — K21.9 GASTROESOPHAGEAL REFLUX DISEASE WITHOUT ESOPHAGITIS: ICD-10-CM

## 2025-06-18 LAB
ALBUMIN SERPL BCG-MCNC: 4 G/DL (ref 3.5–5.2)
ALP SERPL-CCNC: 140 U/L (ref 40–150)
ALT SERPL W P-5'-P-CCNC: 78 U/L (ref 0–50)
ANION GAP SERPL CALCULATED.3IONS-SCNC: 19 MMOL/L (ref 7–15)
AST SERPL W P-5'-P-CCNC: ABNORMAL U/L
BILIRUB SERPL-MCNC: 0.2 MG/DL
BUN SERPL-MCNC: 2.9 MG/DL (ref 8–23)
CALCIUM SERPL-MCNC: 8.9 MG/DL (ref 8.8–10.4)
CHLORIDE SERPL-SCNC: 89 MMOL/L (ref 98–107)
CREAT SERPL-MCNC: 0.35 MG/DL (ref 0.51–0.95)
EGFRCR SERPLBLD CKD-EPI 2021: >90 ML/MIN/1.73M2
GLUCOSE SERPL-MCNC: 878 MG/DL (ref 70–99)
HCO3 SERPL-SCNC: 21 MMOL/L (ref 22–29)
POTASSIUM SERPL-SCNC: 3.3 MMOL/L (ref 3.4–5.3)
PROT SERPL-MCNC: 6.7 G/DL (ref 6.4–8.3)
SODIUM SERPL-SCNC: 129 MMOL/L (ref 135–145)

## 2025-06-18 RX ORDER — ACETAMINOPHEN 325 MG/1
650 TABLET ORAL
Start: 2025-06-18

## 2025-06-18 RX ORDER — DIPHENHYDRAMINE HCL 25 MG
25 TABLET ORAL
Start: 2025-06-18

## 2025-06-18 ASSESSMENT — PAIN SCALES - GENERAL: PAINLEVEL_OUTOF10: SEVERE PAIN (9)

## 2025-06-18 NOTE — TELEPHONE ENCOUNTER
Received a phone call from the lab that blood glucose is 878. I called the pt and advised to go to ED and pt agreed .

## 2025-06-18 NOTE — PROGRESS NOTES
FOLLOW UP GI CLINIC VISIT    CC/REFERRING MD:  Referred Self  REASON FOR CONSULTATION:   Referred Self for   Chief Complaint   Patient presents with    Follow Up     Follow up for abdominal pain, and constipation.       ASSESSMENT/PLAN: Chantell Kidd is a 59 year old female that is seen for follow up in the GI clinic today from recent hospitalizations.  To review, the patient has chronic recurring abdominal pain and back pain, nausea and vomiting, and constipation.  Contributing factors include medications, suspected global GI hypomotility.  Currently seeing improved stool output with the addition of Linzess as well as over-the-counter laxatives.  Also on PPI, H2 RA, sucralfate, as well as metoclopramide and ondansetron as needed.  Continues with tube feeds and doing some oral intake.  Reviewed recent labs from primary care office, severely elevated blood sugar and transaminitis again noted.  Plan for recheck of CMP today.  She missed appointment with pain specialty yesterday and is rescheduled to July.  She is interested in having oxycodone refilled, though reviewed that minimizing opioids was the recommendation of inpatient pain and GI teams.  Reviewed that this is a challenging situation.  I did let her know that I would reach out to primary care provider to discuss this further.  Regarding her elevated CEA, she has been recommended to undergo screening colonoscopy.  This has been ordered by PCP, scheduling information given.  At this moment, medication therapies for her symptoms have largely been optimized and I do not have further adjustments to make.  The addition of Motegrity may be useful to assist with foregut emptying, though I doubt it would have a bigger impact than the currently prescribed Reglan, especially as she is now seeing improved stool output.    1. Post-pancreatectomy diabetes (H)  - Adult GI  Referral - Consult Only    2. Exocrine pancreatic insufficiency  - Adult GI  Referral  - Consult Only    3. Gastroparesis (Primary)    4. Gastroesophageal reflux disease without esophagitis    5. Transaminitis    6. Chronic abdominal pain      Thank you for this consultation.  It was a pleasure to participate in the care of this patient; please contact us with any further questions.      37 minutes spent on the date of the encounter doing chart review, patient visit, and documentation    This note was created with voice recognition software, and while reviewed for accuracy, typos may remain.     Joey Feldman PA-C  Division of Gastroenterology, Hepatology and Nutrition  [facility]  Chantell Kidd is a 59 year old female that is seen for follow up in the GI clinic today.  I actually met with Chantell for the first time in April 2023, this is my first visit with her since then.    To review, she has a very complex past medical history that includes idiopathic chronic pancreatitis status post EUS and multiple ERCPs with eventual total pancreatectomy with auto islet cell transplant in January 2012.  Since that procedure, she has had issues with recurrent and chronic abdominal pain, nausea/vomiting, and constipation.  Workup for this has included multiple CTs, MRCP's, abdominal ultrasounds, EGDs, and colonoscopy.     She most recently was admitted to the hospital about a month ago with acute on chronic abdominal pain and back pain.  She was seen by GI and pain services inpatient, recommendations to minimize narcotics and pursue aggressive bowel regimen as it was felt that constipation was a major contributor to abdominal pain.  She most recently had repositioning/replacement of PEG-J on 5/2/2025.  Transaminitis noted during hospital stay and felt to be secondary to MAFLD.  LFTs trending down at the time of discharge.    She did see primary care provider following hospital stay, noted laboratory findings included normal CBC, thyroid studies, though CMP notable  for severely elevated blood sugar over 700 as well as elevated liver enzymes again.  She notes that since discharge, has had some intermittent spells of upper abdominal pain and back pain.  Has had vomiting typically a few times daily, usually gets no warning or prior nausea.  She has been eating toast in the morning, skipping lunch, and then eating some grilled food for dinner.  She continues with her tube feeds, seems to be tolerating well.  Weight largely stable since discharge.    Her current regimen includes metoclopramide 3 times daily, linaclotide 145 mg every morning, ondansetron as needed, Creon with meals, pantoprazole, famotidine, MiraLAX, and sennosides-docusate.  She has been getting better stool output, has soft to watery, nonbloody stool typically daily, sometimes skipping days.  She may have a few bowel movements in a row to fully empty, but feels like her stool output is better than it has been in a long time.    Main concern today is recurrent pain.  Previously treated with opioids but was recommended to work on tapering down and ultimately off opioids going forward.  She has been referred to pain clinic, looks like she was unable to attend appointment with pain management yesterday due to lack of transportation, rescheduled for July.  Hoping to get another prescription for oxycodone today.    ROS:    10 point ROS neg other than the symptoms noted above in the HPI.    ALLERGIES:     Allergies   Allergen Reactions    Corticosteroids Other (See Comments)     All oral, IV and injectable steroids are contraindicated (unless in life threatening situations) in Islet Auto transplant recipients. They can cause irreversible loss of islet cell function. Please contact patient's transplant care coordinator YURI Cortez RN at [phone]/pager [phone] and/or endocrinologist prior to administration.      Chocolate Flavoring Agent (Non-Screening) Rash     Breaks out when eats chocolate       PERTINENT  MEDICATIONS:    Current Outpatient Medications:     acetaminophen (TYLENOL) 325 MG tablet, Take 3 tablets (975 mg) by mouth every 8 hours, Disp:  , Rfl:     Acetone, Urine, Test (KETONE TEST) STRP, 1 each by In Vitro route as needed (hyperglycemia), Disp: 50 strip, Rfl: 0    Alcohol Swabs PADS, Use to swab the area of the injection or tiago as directed Per insurance coverage, Disp: 200 each, Rfl: 1    alendronate (FOSAMAX) 70 MG tablet, Take 1 tablet (70 mg) by mouth every 7 days On Sundays take first thing in the morning with plain water and remain upright for at least 30 minutes and until after first food of the day  Do not restart Fosamax (alendronate) until your difficulty swallowing has resolved and you have finished the entire course of fluconazole (Diflucan)., Disp: 4 tablet, Rfl: 0    alum & mag hydroxide-simethicone (MYLANTA/MAALOX) 200-200-25 MG CHEW chewable tablet, Take 1 tablet by mouth 3 times daily as needed for indigestion, Disp: , Rfl:     Continuous Glucose  (DEXCOM G7 ) RICARDO, Use to read blood sugars as per 's instructions., Disp: 1 each, Rfl: 0    Continuous Glucose Sensor (DEXCOM G7 SENSOR) MISC, Change every 10 days., Disp: 9 each, Rfl: 5    CONTOUR NEXT TEST test strip, USE TO TEST BLOOD SUGAR 6 TIMES DAILY OR AS DIRECTED. Call clinic to schedule follow up appointment.  IMPORTANT, Disp: 600 strip, Rfl: 1    cyclobenzaprine (FLEXERIL) 10 MG tablet, Take 1 tablet (10 mg) by mouth or Feeding Tube 3 times daily as needed for muscle spasms., Disp: 60 tablet, Rfl: 0    diclofenac (VOLTAREN) 1 % topical gel, Apply 2 g topically 4 times daily as needed for moderate pain., Disp: 50 g, Rfl: 0    Digestive Enzyme Cartridge (RELIZORB) Device, Place 1 each (1 Device) into OG Tube 2 times daily. Administer as 1 cartridge every 12 hours, Disp: 60 each, Rfl: 11    droNABinol (MARINOL) 2.5 MG capsule, Take 2 capsules (5 mg) by mouth 2 times daily as needed (nausea abdominal  discomfort)., Disp: , Rfl:     DULoxetine (CYMBALTA) 30 MG capsule, Take 1 capsule (30 mg) by mouth daily With 60mg capsule for total dose of 90mg, Disp: 90 capsule, Rfl: 0    DULoxetine (CYMBALTA) 60 MG EC capsule, Take 1 capsule (60 mg) by mouth daily With 30mg capsule for total dose of 90mg, Disp: 90 capsule, Rfl: 1    famotidine (PEPCID) 20 MG tablet, Take 1 tablet (20 mg) by mouth At Bedtime, Disp: 30 tablet, Rfl: 0    furosemide (LASIX) 40 MG tablet, Take 1 tablet (40 mg) by mouth daily., Disp: 30 tablet, Rfl: 0    gabapentin (NEURONTIN) 400 MG capsule, Take 1 capsule (400 mg) by mouth 3 times daily., Disp: 90 capsule, Rfl: 2    Glucagon (GVOKE HYPOPEN 2-PACK) 1 MG/0.2ML pen, Inject the contents of 1 device under the skin into lower abdomen, outer thigh, or outer upper arm as needed for hypoglycemia. If no response after 15 minutes, additional 1 mg dose from a new device may be injected while waiting for emergency assistance., Disp: 0.4 mL, Rfl: 0    glycerin (ADULT) 2 g suppository, Place 1 suppository rectally daily as needed for constipation., Disp: , Rfl:     hydrocortisone (CORTEF) 10 MG tablet, Take 10 mg in the morning and 10 mg along with a 5 mg tablet at bedtime for a total dose of 15 mg at bedtime. Watch for hypoglycemia recurrence., Disp: , Rfl:     hydrocortisone (CORTEF) 5 MG tablet, Take 5 mg by mouth At Bedtime along with a 10 mg tablet for a total dose of 15 mg, Disp: , Rfl:     hydrocortisone sodium succinate PF (SOLU-CORTEF) 100 MG injection, Inject 100mg (1 mL) into muscle in emergency or unable to take oral hydrocortisone. Go to emergency room if given. ActoVial NDC [address]-77. Note to pharmacy: Provide two 3ml syringes with 23g 1 inch needles., Disp: 1 mL, Rfl: 0    Injection Device for insulin (NOVOPEN ECHO) RICARDO, Use with   Insulin, Disp: 1 each, Rfl: 0    insulin aspart (NOVOLOG PEN) 100 UNIT/ML pen, Continue Novolog Meal Coverage: 1 unit per 12 grams CHO, TID AC and 1:15 PRN with  snacks/supplements - Continue Novolog Correction Scale: 1:75>150 TID AC, HS, 0200 at 1400, Disp: 15 mL, Rfl: 11    insulin aspart (NOVOLOG PEN) 100 UNIT/ML pen, -novolog insulin: 1 unit for every 12 grams of carbs with meals and snacks plus correction insulin per scale below: Meal time sliding scale insulin:If -199 give 1 unit If -249 give 2 units If -299 give 3 units If -349 give 4 units If BG greater than 350 give 5 units, Bedtime sliding scale insulin: If -249 give 1 unit If -299 give 2 units If -349 give 3 units If BG greater than 350 give 4 units, Disp: 15 mL, Rfl: 0    Insulin Disposable Pump (OMNIPOD 5 G6 INTRO, GEN 5,) KIT, 1 each See Admin Instructions Use continuously to infuse insulin., Disp: 1 kit, Rfl: 0    insulin glargine (LANTUS PEN) 100 UNIT/ML pen, Inject 10 Units subcutaneously every 24 hours., Disp: 15 mL, Rfl: 0    insulin  UNIT/ML injection, Inject 10 Units subcutaneously 2 times daily., Disp: 15 mL, Rfl: 0    insulin pen needle (PIP PEN NEEDLES 32G X 4MM) 32G X 4 MM miscellaneous, Use 6 pen needles daily or as directed., Disp: 200 each, Rfl: 5    [employer] 1.5 Peptide Plain 325 mL, Place 1,080 mLs into J tube daily. Infuse [employer] Peptide 1.5 @ 45 ml/hr into J-tube via pump Water flush: 120 ml tid + an additional 550 ml through flushes or oral intake Kcals: 1662 Cartons per day: 3.5, Disp: 64383 mL, Rfl: 11    levothyroxine (SYNTHROID/LEVOTHROID) 125 MCG tablet, Take 1 tablet (125 mcg) by mouth every morning (before breakfast)., Disp: 30 tablet, Rfl: 0    Lidocaine (LIDOCARE) 4 % Patch, Place 1-2 patches over 12 hours onto the skin every 24 hours. To prevent lidocaine toxicity, patient should be patch free for 12 hrs daily., Disp: 20 patch, Rfl: 0    linaclotide (LINZESS) 145 MCG capsule, Take 1 capsule (145 mcg) by mouth every morning (before breakfast). as needed, Disp: 30 capsule, Rfl: 0    lipase-protease-amylase (CREON 12)  [id number] units CPEP, Take 8 capsules by mouth 3 times daily (with meals). 6 capsules with snacks, Disp: , Rfl:     metoclopramide (REGLAN) 5 MG tablet, Take 2 tablets (10 mg) by mouth 3 times daily., Disp: 90 tablet, Rfl: 0    naloxone (NARCAN) 4 MG/0.1ML nasal spray, Spray 1 spray (4 mg) into one nostril alternating nostrils as needed for opioid reversal. every 2-3 minutes until assistance arrives, Disp: 2 each, Rfl: 0    Nutritional Supplements (BOOST HIGH PROTEIN) LIQD, After above baseline labs are drawn, give: 6 mL/kg to maximum of 360 mL; the beverage is to be consumed within 5 minutes., Disp: , Rfl: 0    ondansetron (ZOFRAN) 4 MG tablet, Take 1 tablet (4 mg) by mouth every 8 hours as needed for nausea., Disp: 20 tablet, Rfl: 0    oxyCODONE (ROXICODONE) 5 MG tablet, Take 1-2 tablets (5-10 mg) by mouth every 6 hours as needed for pain. Take the lowest possible dose to control your pain, and use non-opioid pain options first. Decrease the number of pills you take each day after you discharge until you are able to stop completely., Disp: 16 tablet, Rfl: 0    pantoprazole (PROTONIX) 40 MG EC tablet, Take 1 tablet (40 mg) by mouth 2 times daily., Disp: 60 tablet, Rfl: 0    polyethylene glycol (MIRALAX) 17 GM/Dose powder, Take 17 g by mouth 3 times daily., Disp: 510 g, Rfl: 0    potassium chloride ER (KLOR-CON M) 20 MEQ CR tablet, Take 1 tablet (20 mEq) by mouth daily, Disp: 90 tablet, Rfl: 1    senna-docusate (SENOKOT-S/PERICOLACE) 8.6-50 MG tablet, Take 2 tablets by mouth 2 times daily., Disp: 160 tablet, Rfl: 0    Sharps Container MISC, Use as directed to dispose of needles, lancets and other sharps, Disp: 1 each, Rfl: 1    simethicone (MYLICON) 80 MG chewable tablet, Take 1 tablet (80 mg) by mouth 4 times daily., Disp: 160 tablet, Rfl: 0    sodium chloride (OCEAN) 0.65 % nasal spray, Spray 1 spray into both nostrils daily as needed for congestion, Disp: 30 mL, Rfl: 0    sodium chloride, PF, 0.9% PF  flush, Inject 10-40 mLs into catheter every 8 hours. -- Flush J port and G port EACH with 30 ml water every 8 hours -- Flush J port with 30 ml water BEFORE AND AFTER medications to avoid clogging of this tube., Disp: 500 mL, Rfl: 0    sucralfate (CARAFATE) 1 GM tablet, Take 1 tablet (1 g) by mouth 4 times daily., Disp: 160 tablet, Rfl: 0    SUMAtriptan (IMITREX) 50 MG tablet, Take 1 tablet (50 mg) by mouth at onset of headache for migraine Take 1 Tab by mouth Once as needed for Migraine Headache. May repeat after two hours.  Maximum dose 200 mg/24 hours., Disp: 30 tablet, Rfl: 1    thiamine (B-1) 100 MG tablet, Place 1 tablet (100 mg) into Feeding Tube daily., Disp: 30 tablet, Rfl: 0    topiramate (TOPAMAX) 100 MG tablet, Take 1 tablet (100 mg) by mouth 2 times daily., Disp: 50 tablet, Rfl: 0    traZODone (DESYREL) 100 MG tablet, TAKE 1-2 TABLETS BY MOUTH AN HOUR BEFORE BEDTIME, Disp: 60 tablet, Rfl: 0    PROBLEM LIST  Patient Active Problem List    Diagnosis Date Noted    Transaminitis 05/15/2025     Priority: Medium    ANGELINA (acute kidney injury) 05/15/2025     Priority: Medium    Gastroparesis 05/13/2025     Priority: Medium    Peripheral edema 04/30/2025     Priority: Medium    Generalized abdominal pain 04/30/2025     Priority: Medium    Hyperglycemia 04/15/2025     Priority: Medium    Encounter for feeding tube placement 04/15/2025     Priority: Medium    Acute cystitis with hematuria 09/26/2022     Priority: Medium    Gastroesophageal reflux disease without esophagitis 09/26/2022     Priority: Medium    RUQ abdominal pain 09/26/2022     Priority: Medium    Weight loss 09/26/2022     Priority: Medium    Age-related osteoporosis without current pathological fracture 11/12/2020     Priority: Medium    Gastrointestinal hemorrhage, unspecified gastrointestinal hemorrhage type 11/08/2020     Priority: Medium    Chondromalacia of left patella 07/06/2018     Priority: Medium    Nausea 11/17/2016     Priority: Medium     Adrenal insufficiency 09/15/2016     Priority: Medium    S/P hernia repair 09/15/2016     Priority: Medium    Anemia, iron deficiency 08/31/2016     Priority: Medium    Iron deficiency 08/09/2016     Priority: Medium    Odynophagia 08/02/2016     Priority: Medium    Decreased oral intake 07/06/2016     Priority: Medium    Mood disorder due to a general medical condition 03/29/2016     Priority: Medium    Hypoglycemia 03/15/2016     Priority: Medium    Hypothyroidism 04/23/2015     Priority: Medium    Exocrine pancreatic insufficiency 03/05/2015     Priority: Medium    CMC DJD(carpometacarpal degenerative joint disease), localized primary 02/12/2015     Priority: Medium    CIERRA (generalized anxiety disorder) 08/06/2014     Priority: Medium    Major depressive disorder, recurrent episode, moderate (H) 08/06/2014     Priority: Medium    Abdominal muscle strain 08/05/2014     Priority: Medium    Migraine 07/16/2014     Priority: Medium     Problem list name updated by automated process. Provider to review      Type 1 diabetes mellitus with hypoglycemia and without coma (H) 12/17/2013     Priority: Medium     Problem list name updated by automated process. 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 12/05/2013     Priority: Medium     Severe Hypo Episode on 11/24/13      Abdominal pain 08/16/2012     Priority: Medium    Appendicitis 07/31/2012     Priority: Medium    CARDIOVASCULAR SCREENING; LDL GOAL LESS THAN 160 06/08/2012     Priority: Medium    Post-pancreatectomy diabetes (H) 03/29/2012     Priority: Medium    Islet Auto Transplant-5,000 + IE/KG Pathology- fat necrosis and fatty infiltration 01/16/2012     Priority: Medium       PERTINENT PAST MEDICAL HISTORY:  Past Medical History:   Diagnosis Date    Chronic abdominal pain     Chronic pancreatitis (H)     S/P pancreatectomy    Depression with anxiety     Gastro-oesophageal reflux disease     Gastroparesis 05/13/2025     Hypothyroidism 04/23/2015    Kidney stones     Low serum cortisol level     Migraines     Other chronic pain     STOMACH    Other chronic pain     LUMBAR SPINE    Peripheral neuropathy     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    TPIAT    Spasm of sphincter of Oddi        PREVIOUS SURGERIES:  Past Surgical History:   Procedure Laterality Date    ARTHROPLASTY CARPOMETACARPAL (THUMB JOINT)  05/02/2014    Procedure: ARTHROPLASTY CARPOMETACARPAL (THUMB JOINT);  Surgeon: Carina Panda MD;  Location: [facility]    CHOLECYSTECTOMY  01/01/2004    COLONOSCOPY  07/18/2014    Procedure: COLONOSCOPY;  Surgeon: Aurora Sahu MD;  [facility]: [facility]    COLONOSCOPY N/A 08/01/2017    Procedure: COLONOSCOPY;  Colonoscopy and upper endoscopy;  Surgeon: Deirdre Harris MD;  [facility]: [facility]    ENDOSCOPIC INSERTION TUBE JEJUNOSTOMY N/A 5/2/2025    Procedure: Esophagogastroduodenoscopy, Jejunopexy, JEJUNOSTOMY TUBE PLACEMENT, GASTROSTOMY TUBE EXCHANGE;  Surgeon: Jose Francisco Perdomo MD;  [facility]    ENDOSCOPIC RETROGRADE CHOLANGIOPANCREATOGRAM      ENDOSCOPIC RETROGRADE CHOLANGIOPANCREATOGRAM  04/19/2011    Procedure:ENDOSCOPIC RETROGRADE CHOLANGIOPANCREATOGRAM; Pancreatic Stent Placement      ENDOSCOPIC RETROGRADE CHOLANGIOPANCREATOGRAM  05/26/2011    Procedure:ENDOSCOPIC RETROGRADE CHOLANGIOPANCREATOGRAM; with Pancreatic Stent Removal; Surgeon:DALE MIMS; [facility]    ENDOSCOPY UPPER, COLONOSCOPY, COMBINED  04/25/2012    Procedure:COMBINED ENDOSCOPY UPPER, COLONOSCOPY; Enteroscopy with Bile Duct Stent Removal, Colonoscopy  *Latex Safe Room*; Surgeon:GRACY GODWINIQ; [facility]    ESOPHAGOSCOPY, GASTROSCOPY, DUODENOSCOPY (EGD), COMBINED  05/26/2011    Procedure:COMBINED ESOPHAGOSCOPY, GASTROSCOPY, DUODENOSCOPY (EGD); Surgeon:DALE MIMS; [facility]    ESOPHAGOSCOPY, GASTROSCOPY, DUODENOSCOPY (EGD), COMBINED N/A 10/30/2014    Procedure: COMBINED ESOPHAGOSCOPY,  GASTROSCOPY, DUODENOSCOPY (EGD), BIOPSY SINGLE OR MULTIPLE;  Surgeon: Sarai Moon MD;  [facility]    ESOPHAGOSCOPY, GASTROSCOPY, DUODENOSCOPY (EGD), COMBINED Left 07/06/2015    Procedure: COMBINED ESOPHAGOSCOPY, GASTROSCOPY, DUODENOSCOPY (EGD), BIOPSY SINGLE OR MULTIPLE;  Surgeon: Thomas Estrada MD;  [facility]    ESOPHAGOSCOPY, GASTROSCOPY, DUODENOSCOPY (EGD), COMBINED N/A 07/08/2016    Procedure: COMBINED ESOPHAGOSCOPY, GASTROSCOPY, DUODENOSCOPY (EGD), BIOPSY SINGLE OR MULTIPLE;  Surgeon: Eloy Klein MD;  [facility]    ESOPHAGOSCOPY, GASTROSCOPY, DUODENOSCOPY (EGD), COMBINED N/A 08/04/2016    Procedure: COMBINED ESOPHAGOSCOPY, GASTROSCOPY, DUODENOSCOPY (EGD), BIOPSY SINGLE OR MULTIPLE;  Surgeon: Jason Brown MD;  [facility]    ESOPHAGOSCOPY, GASTROSCOPY, DUODENOSCOPY (EGD), COMBINED N/A 08/01/2017    Procedure: COMBINED ESOPHAGOSCOPY, GASTROSCOPY, DUODENOSCOPY (EGD);;  Surgeon: Deirdre Harris MD;  Location:  GI    ESOPHAGOSCOPY, GASTROSCOPY, DUODENOSCOPY (EGD), COMBINED N/A 06/12/2019    Procedure: ESOPHAGOGASTRODUODENOSCOPY (EGD);  Surgeon: Jose Francisco Perdomo MD;  Location:  GI    GYN SURGERY      Hysterectomy and USO    [facility] ENDOSCOPY W EUS  07/20/2011    Procedure:COMBINED ENDOSCOPIC ULTRASOUND, ESOPHAGOSCOPY, GASTROSCOPY, DUODENOSCOPY (EGD); Surgeon:DARVIN DONOHUE; [facility]    HERNIORRHAPHY VENTRAL N/A 09/15/2016    Procedure: HERNIORRHAPHY VENTRAL;  Surgeon: Juanita Bernabe MD;  [facility]    HYSTERECTOMY  1997 or 1998    USO    INCISION AND DRAINAGE ABDOMEN WASHOUT, COMBINED  08/16/2012    Procedure: COMBINED INCISION AND DRAINAGE ABDOMEN WASHOUT;  ,debridement and Drainage Post Appendectomy;  Surgeon: Ron Austin MD;  [facility]    INJECT TRANSVERSUS ABDOMINIS PLANE (TAP) BLOCK BILATERAL Bilateral 05/26/2016    Procedure: INJECT TRANSVERSUS ABDOMINIS PLANE (TAP) BLOCK BILATERAL;   Surgeon: Leonard Mccallum MD;  [facility]    IR NG TUBE PLACEMENT REQ RAD & FLUORO  12/04/2017    LAPAROSCOPIC APPENDECTOMY  07/30/2012    Procedure: LAPAROSCOPIC APPENDECTOMY;  Open Appendectomy;  Surgeon: Ron Austin MD;  [facility]    PANCREATECTOMY, TRANSPLANT AUTO ISLET CELL, COMBINED  01/06/2012    Procedure:COMBINED PANCREATECTOMY, TRANSPLANT AUTO ISLET CELL; Total  Pancreatectomy, Auto Islet Transplant, splenectomy, 18fr. transgastric-jejunal feeding tube placement, liver biopsy; Surgeon:PALAK LEE; [facility]    PICC DOUBLE LUMEN PLACEMENT Right 04/19/2025    5FR DL PICC, brachial medial vein. L-38cm, 3cm out.    PICC INSERTION - DOUBLE LUMEN Right 04/30/2025    39-3cm, Medial brachial vein    REPLACE GASTROSTOMY TUBE, PERCUTANEOUS N/A 08/30/2017    Procedure: REPLACE GASTROSTOMY TUBE, PERCUTANEOUS;  GJ Tube Change;  Surgeon: Jose Nath PA-C;  [facility]    SPLENECTOMY         SOCIAL HISTORY:  Social History     Socioeconomic History    Marital status:      Spouse name: Not on file    Number of children: Not on file    Years of education: Not on file    Highest education level: Not on file   Occupational History    Not on file   Tobacco Use    Smoking status: Never    Smokeless tobacco: Never   Vaping Use    Vaping status: Never Used   Substance and Sexual Activity    Alcohol use: No     Alcohol/week: 0.0 standard drinks of alcohol    Drug use: No    Sexual activity: Yes   Other Topics Concern    Parent/sibling w/ CABG, MI or angioplasty before 65F 55M? Not Asked   Social History Narrative    Not on file     Social Drivers of Health     Financial Resource Strain: Low Risk  (5/15/2025)    Financial Resource Strain     Within the past 12 months, have you or your family members you live with been unable to get utilities (heat, electricity) when it was really needed?: No   Food Insecurity: Low Risk  (5/15/2025)    Food Insecurity     Within the past 12  months, did you worry that your food would run out before you got money to buy more?: No     Within the past 12 months, did the food you bought just not last and you didn t have money to get more?: No   Recent Concern: Food Insecurity - High Risk (4/16/2025)    Food Insecurity     Within the past 12 months, did you worry that your food would run out before you got money to buy more?: Yes     Within the past 12 months, did the food you bought just not last and you didn t have money to get more?: Yes   Transportation Needs: Low Risk  (5/15/2025)    Transportation Needs     Within the past 12 months, has lack of transportation kept you from medical appointments, getting your medicines, non-medical meetings or appointments, work, or from getting things that you need?: No   Physical Activity: Not on file   Stress: Not on file   Social Connections: Not on file   Interpersonal Safety: Low Risk  (5/15/2025)    Interpersonal Safety     Do you feel physically and emotionally safe where you currently live?: Yes     Within the past 12 months, have you been hit, slapped, kicked or otherwise physically hurt by someone?: No     Within the past 12 months, have you been humiliated or emotionally abused in other ways by your partner or ex-partner?: No   Housing Stability: Low Risk  (5/15/2025)    Housing Stability     Do you have housing? : Yes     Are you worried about losing your housing?: No       FAMILY HISTORY:  Family History   Problem Relation Age of Onset    Hypertension Mother     Diabetes Mother     Osteoporosis Mother     Cancer Father         pancreatic cancer    Diabetes Maternal Grandmother     Cardiovascular Maternal Grandmother     Cancer Maternal Grandfather         lung cancer    Cancer Sister         brain    Cancer Sister         liver cancer       Past/family/social history reviewed and no changes    PHYSICAL EXAMINATION:  Constitutional: aaox3, cooperative, pleasant, not dyspneic/diaphoretic, no acute  "distress  Vitals reviewed: /79 (BP Location: Left arm, Patient Position: Sitting, Cuff Size: Adult Small)   Pulse 95   Ht 1.575 m (5' 2\")   Wt 46.3 kg (102 lb)   SpO2 100%   BMI 18.66 kg/m    Wt:   Wt Readings from Last 2 Encounters:   06/18/25 46.3 kg (102 lb)   06/09/25 45.6 kg (100 lb 8 oz)      Eyes: Sclera anicteric/injected  CV: No edema  Respiratory: Unlabored breathing  Skin: warm, perfused, no jaundice  Psych: Normal affect  MSK: Normal gait                    "

## 2025-06-18 NOTE — TELEPHONE ENCOUNTER
No response received from patient. I will forward to Dr. Maher as FYI and have scheduling reach out to establish with new Diabetes Educator.    Libby Jay RN, [facility]  Diabetes Education Department  Clinic days: Monday-Thursday, OFF Friday's  [facility], [place]

## 2025-06-18 NOTE — NURSING NOTE
"Chief Complaint   Patient presents with    Follow Up     Follow up for abdominal pain, and constipation.     She requests these members of her care team be copied on today's visit information:  PCP: Ron Morgan    Vitals:    06/18/25 1154   BP: 135/79   BP Location: Left arm   Patient Position: Sitting   Cuff Size: Adult Small   Pulse: 95   SpO2: 100%   Weight: 46.3 kg (102 lb)   Height: 1.575 m (5' 2\")     Body mass index is 18.66 kg/m .    Medications were reconciled.    Does patient need any medication refills at today's visit? No        Danielle Almonte CMA        "

## 2025-06-18 NOTE — LETTER
6/18/2025      Chantell Kidd  [address]  [facility] [address]      Dear Colleague,    Thank you for referring your patient, Chantell Kidd, to the [facility]. Please see a copy of my visit note below.    FOLLOW UP GI CLINIC VISIT    CC/REFERRING MD:  Referred Self  REASON FOR CONSULTATION:   Referred Self for   Chief Complaint   Patient presents with     Follow Up     Follow up for abdominal pain, and constipation.       ASSESSMENT/PLAN: Chantell Kidd is a 59 year old female that is seen for follow up in the GI clinic today from recent hospitalizations.  To review, the patient has chronic recurring abdominal pain and back pain, nausea and vomiting, and constipation.  Contributing factors include medications, suspected global GI hypomotility.  Currently seeing improved stool output with the addition of Linzess as well as over-the-counter laxatives.  Also on PPI, H2 RA, sucralfate, as well as metoclopramide and ondansetron as needed.  Continues with tube feeds and doing some oral intake.  Reviewed recent labs from primary care office, severely elevated blood sugar and transaminitis again noted.  Plan for recheck of CMP today.  She missed appointment with pain specialty yesterday and is rescheduled to July.  She is interested in having oxycodone refilled, though reviewed that minimizing opioids was the recommendation of inpatient pain and GI teams.  Reviewed that this is a challenging situation.  I did let her know that I would reach out to primary care provider to discuss this further.  Regarding her elevated CEA, she has been recommended to undergo screening colonoscopy.  This has been ordered by PCP, scheduling information given.  At this moment, medication therapies for her symptoms have largely been optimized and I do not have further adjustments to make.  The addition of Motegrity may be useful to assist with foregut emptying, though I doubt it would have a bigger impact than the  currently prescribed Reglan, especially as she is now seeing improved stool output.    1. Post-pancreatectomy diabetes (H)  - Adult GI  Referral - Consult Only    2. Exocrine pancreatic insufficiency  - Adult GI  Referral - Consult Only    3. Gastroparesis (Primary)    4. Gastroesophageal reflux disease without esophagitis    5. Transaminitis    6. Chronic abdominal pain      Thank you for this consultation.  It was a pleasure to participate in the care of this patient; please contact us with any further questions.      37 minutes spent on the date of the encounter doing chart review, patient visit, and documentation    This note was created with voice recognition software, and while reviewed for accuracy, typos may remain.     Joey Feldman PA-C  Division of Gastroenterology, Hepatology and Nutrition  [facility]  Chantell Kidd is a 59 year old female that is seen for follow up in the GI clinic today.  I actually met with Chantell for the first time in April 2023, this is my first visit with her since then.    To review, she has a very complex past medical history that includes idiopathic chronic pancreatitis status post EUS and multiple ERCPs with eventual total pancreatectomy with auto islet cell transplant in January 2012.  Since that procedure, she has had issues with recurrent and chronic abdominal pain, nausea/vomiting, and constipation.  Workup for this has included multiple CTs, MRCP's, abdominal ultrasounds, EGDs, and colonoscopy.     She most recently was admitted to the hospital about a month ago with acute on chronic abdominal pain and back pain.  She was seen by GI and pain services inpatient, recommendations to minimize narcotics and pursue aggressive bowel regimen as it was felt that constipation was a major contributor to abdominal pain.  She most recently had repositioning/replacement of PEG-J on 5/2/2025.  Transaminitis noted during  hospital stay and felt to be secondary to MAFLD.  LFTs trending down at the time of discharge.    She did see primary care provider following hospital stay, noted laboratory findings included normal CBC, thyroid studies, though CMP notable for severely elevated blood sugar over 700 as well as elevated liver enzymes again.  She notes that since discharge, has had some intermittent spells of upper abdominal pain and back pain.  Has had vomiting typically a few times daily, usually gets no warning or prior nausea.  She has been eating toast in the morning, skipping lunch, and then eating some grilled food for dinner.  She continues with her tube feeds, seems to be tolerating well.  Weight largely stable since discharge.    Her current regimen includes metoclopramide 3 times daily, linaclotide 145 mg every morning, ondansetron as needed, Creon with meals, pantoprazole, famotidine, MiraLAX, and sennosides-docusate.  She has been getting better stool output, has soft to watery, nonbloody stool typically daily, sometimes skipping days.  She may have a few bowel movements in a row to fully empty, but feels like her stool output is better than it has been in a long time.    Main concern today is recurrent pain.  Previously treated with opioids but was recommended to work on tapering down and ultimately off opioids going forward.  She has been referred to pain clinic, looks like she was unable to attend appointment with pain management yesterday due to lack of transportation, rescheduled for July.  Hoping to get another prescription for oxycodone today.    ROS:    10 point ROS neg other than the symptoms noted above in the HPI.    ALLERGIES:     Allergies   Allergen Reactions     Corticosteroids Other (See Comments)     All oral, IV and injectable steroids are contraindicated (unless in life threatening situations) in Islet Auto transplant recipients. They can cause irreversible loss of islet cell function. Please contact  patient's transplant care coordinator YURI Cortez RN at [phone]/pager [phone] and/or endocrinologist prior to administration.       Chocolate Flavoring Agent (Non-Screening) Rash     Breaks out when eats chocolate       PERTINENT MEDICATIONS:    Current Outpatient Medications:      acetaminophen (TYLENOL) 325 MG tablet, Take 3 tablets (975 mg) by mouth every 8 hours, Disp:  , Rfl:      Acetone, Urine, Test (KETONE TEST) STRP, 1 each by In Vitro route as needed (hyperglycemia), Disp: 50 strip, Rfl: 0     Alcohol Swabs PADS, Use to swab the area of the injection or tiago as directed Per insurance coverage, Disp: 200 each, Rfl: 1     alendronate (FOSAMAX) 70 MG tablet, Take 1 tablet (70 mg) by mouth every 7 days On Sundays take first thing in the morning with plain water and remain upright for at least 30 minutes and until after first food of the day  Do not restart Fosamax (alendronate) until your difficulty swallowing has resolved and you have finished the entire course of fluconazole (Diflucan)., Disp: 4 tablet, Rfl: 0     alum & mag hydroxide-simethicone (MYLANTA/MAALOX) 200-200-25 MG CHEW chewable tablet, Take 1 tablet by mouth 3 times daily as needed for indigestion, Disp: , Rfl:      Continuous Glucose  (DEXCOM G7 ) RICARDO, Use to read blood sugars as per 's instructions., Disp: 1 each, Rfl: 0     Continuous Glucose Sensor (DEXCOM G7 SENSOR) MISC, Change every 10 days., Disp: 9 each, Rfl: 5     CONTOUR NEXT TEST test strip, USE TO TEST BLOOD SUGAR 6 TIMES DAILY OR AS DIRECTED. Call clinic to schedule follow up appointment.  IMPORTANT, Disp: 600 strip, Rfl: 1     cyclobenzaprine (FLEXERIL) 10 MG tablet, Take 1 tablet (10 mg) by mouth or Feeding Tube 3 times daily as needed for muscle spasms., Disp: 60 tablet, Rfl: 0     diclofenac (VOLTAREN) 1 % topical gel, Apply 2 g topically 4 times daily as needed for moderate pain., Disp: 50 g, Rfl: 0     Digestive Enzyme Cartridge  (RELIZORB) Device, Place 1 each (1 Device) into OG Tube 2 times daily. Administer as 1 cartridge every 12 hours, Disp: 60 each, Rfl: 11     droNABinol (MARINOL) 2.5 MG capsule, Take 2 capsules (5 mg) by mouth 2 times daily as needed (nausea abdominal discomfort)., Disp: , Rfl:      DULoxetine (CYMBALTA) 30 MG capsule, Take 1 capsule (30 mg) by mouth daily With 60mg capsule for total dose of 90mg, Disp: 90 capsule, Rfl: 0     DULoxetine (CYMBALTA) 60 MG EC capsule, Take 1 capsule (60 mg) by mouth daily With 30mg capsule for total dose of 90mg, Disp: 90 capsule, Rfl: 1     famotidine (PEPCID) 20 MG tablet, Take 1 tablet (20 mg) by mouth At Bedtime, Disp: 30 tablet, Rfl: 0     furosemide (LASIX) 40 MG tablet, Take 1 tablet (40 mg) by mouth daily., Disp: 30 tablet, Rfl: 0     gabapentin (NEURONTIN) 400 MG capsule, Take 1 capsule (400 mg) by mouth 3 times daily., Disp: 90 capsule, Rfl: 2     Glucagon (GVOKE HYPOPEN 2-PACK) 1 MG/0.2ML pen, Inject the contents of 1 device under the skin into lower abdomen, outer thigh, or outer upper arm as needed for hypoglycemia. If no response after 15 minutes, additional 1 mg dose from a new device may be injected while waiting for emergency assistance., Disp: 0.4 mL, Rfl: 0     glycerin (ADULT) 2 g suppository, Place 1 suppository rectally daily as needed for constipation., Disp: , Rfl:      hydrocortisone (CORTEF) 10 MG tablet, Take 10 mg in the morning and 10 mg along with a 5 mg tablet at bedtime for a total dose of 15 mg at bedtime. Watch for hypoglycemia recurrence., Disp: , Rfl:      hydrocortisone (CORTEF) 5 MG tablet, Take 5 mg by mouth At Bedtime along with a 10 mg tablet for a total dose of 15 mg, Disp: , Rfl:      hydrocortisone sodium succinate PF (SOLU-CORTEF) 100 MG injection, Inject 100mg (1 mL) into muscle in emergency or unable to take oral hydrocortisone. Go to emergency room if given. ActoVial NDC [address]-32. Note to pharmacy: Provide two 3ml syringes with 23g 1  inch needles., Disp: 1 mL, Rfl: 0     Injection Device for insulin (NOVOPEN ECHO) RICARDO, Use with   Insulin, Disp: 1 each, Rfl: 0     insulin aspart (NOVOLOG PEN) 100 UNIT/ML pen, Continue Novolog Meal Coverage: 1 unit per 12 grams CHO, TID AC and 1:15 PRN with snacks/supplements - Continue Novolog Correction Scale: 1:75>150 TID AC, HS, 0200 at 1400, Disp: 15 mL, Rfl: 11     insulin aspart (NOVOLOG PEN) 100 UNIT/ML pen, -novolog insulin: 1 unit for every 12 grams of carbs with meals and snacks plus correction insulin per scale below: Meal time sliding scale insulin:If -199 give 1 unit If -249 give 2 units If -299 give 3 units If -349 give 4 units If BG greater than 350 give 5 units, Bedtime sliding scale insulin: If -249 give 1 unit If -299 give 2 units If -349 give 3 units If BG greater than 350 give 4 units, Disp: 15 mL, Rfl: 0     Insulin Disposable Pump (OMNIPOD 5 G6 INTRO, GEN 5,) KIT, 1 each See Admin Instructions Use continuously to infuse insulin., Disp: 1 kit, Rfl: 0     insulin glargine (LANTUS PEN) 100 UNIT/ML pen, Inject 10 Units subcutaneously every 24 hours., Disp: 15 mL, Rfl: 0     insulin  UNIT/ML injection, Inject 10 Units subcutaneously 2 times daily., Disp: 15 mL, Rfl: 0     insulin pen needle (PIP PEN NEEDLES 32G X 4MM) 32G X 4 MM miscellaneous, Use 6 pen needles daily or as directed., Disp: 200 each, Rfl: 5     iCIMS 1.5 Peptide Plain 325 mL, Place 1,080 mLs into J tube daily. Infuse iCIMS Peptide 1.5 @ 45 ml/hr into J-tube via pump Water flush: 120 ml tid + an additional 550 ml through flushes or oral intake Kcals: 1662 Cartons per day: 3.5, Disp: 79745 mL, Rfl: 11     levothyroxine (SYNTHROID/LEVOTHROID) 125 MCG tablet, Take 1 tablet (125 mcg) by mouth every morning (before breakfast)., Disp: 30 tablet, Rfl: 0     Lidocaine (LIDOCARE) 4 % Patch, Place 1-2 patches over 12 hours onto the skin every 24 hours. To prevent lidocaine toxicity,  patient should be patch free for 12 hrs daily., Disp: 20 patch, Rfl: 0     linaclotide (LINZESS) 145 MCG capsule, Take 1 capsule (145 mcg) by mouth every morning (before breakfast). as needed, Disp: 30 capsule, Rfl: 0     lipase-protease-amylase (CREON 12) 22379-[address]-66[address] units CPEP, Take 8 capsules by mouth 3 times daily (with meals). 6 capsules with snacks, Disp: , Rfl:      metoclopramide (REGLAN) 5 MG tablet, Take 2 tablets (10 mg) by mouth 3 times daily., Disp: 90 tablet, Rfl: 0     naloxone (NARCAN) 4 MG/0.1ML nasal spray, Spray 1 spray (4 mg) into one nostril alternating nostrils as needed for opioid reversal. every 2-3 minutes until assistance arrives, Disp: 2 each, Rfl: 0     Nutritional Supplements (BOOST HIGH PROTEIN) LIQD, After above baseline labs are drawn, give: 6 mL/kg to maximum of 360 mL; the beverage is to be consumed within 5 minutes., Disp: , Rfl: 0     ondansetron (ZOFRAN) 4 MG tablet, Take 1 tablet (4 mg) by mouth every 8 hours as needed for nausea., Disp: 20 tablet, Rfl: 0     oxyCODONE (ROXICODONE) 5 MG tablet, Take 1-2 tablets (5-10 mg) by mouth every 6 hours as needed for pain. Take the lowest possible dose to control your pain, and use non-opioid pain options first. Decrease the number of pills you take each day after you discharge until you are able to stop completely., Disp: 16 tablet, Rfl: 0     pantoprazole (PROTONIX) 40 MG EC tablet, Take 1 tablet (40 mg) by mouth 2 times daily., Disp: 60 tablet, Rfl: 0     polyethylene glycol (MIRALAX) 17 GM/Dose powder, Take 17 g by mouth 3 times daily., Disp: 510 g, Rfl: 0     potassium chloride ER (KLOR-CON M) 20 MEQ CR tablet, Take 1 tablet (20 mEq) by mouth daily, Disp: 90 tablet, Rfl: 1     senna-docusate (SENOKOT-S/PERICOLACE) 8.6-50 MG tablet, Take 2 tablets by mouth 2 times daily., Disp: 160 tablet, Rfl: 0     Sharps Container MISC, Use as directed to dispose of needles, lancets and other sharps, Disp: 1 each, Rfl: 1     simethicone  (MYLICON) 80 MG chewable tablet, Take 1 tablet (80 mg) by mouth 4 times daily., Disp: 160 tablet, Rfl: 0     sodium chloride (OCEAN) 0.65 % nasal spray, Spray 1 spray into both nostrils daily as needed for congestion, Disp: 30 mL, Rfl: 0     sodium chloride, PF, 0.9% PF flush, Inject 10-40 mLs into catheter every 8 hours. -- Flush J port and G port EACH with 30 ml water every 8 hours -- Flush J port with 30 ml water BEFORE AND AFTER medications to avoid clogging of this tube., Disp: 500 mL, Rfl: 0     sucralfate (CARAFATE) 1 GM tablet, Take 1 tablet (1 g) by mouth 4 times daily., Disp: 160 tablet, Rfl: 0     SUMAtriptan (IMITREX) 50 MG tablet, Take 1 tablet (50 mg) by mouth at onset of headache for migraine Take 1 Tab by mouth Once as needed for Migraine Headache. May repeat after two hours.  Maximum dose 200 mg/24 hours., Disp: 30 tablet, Rfl: 1     thiamine (B-1) 100 MG tablet, Place 1 tablet (100 mg) into Feeding Tube daily., Disp: 30 tablet, Rfl: 0     topiramate (TOPAMAX) 100 MG tablet, Take 1 tablet (100 mg) by mouth 2 times daily., Disp: 50 tablet, Rfl: 0     traZODone (DESYREL) 100 MG tablet, TAKE 1-2 TABLETS BY MOUTH AN HOUR BEFORE BEDTIME, Disp: 60 tablet, Rfl: 0    PROBLEM LIST  Patient Active Problem List    Diagnosis Date Noted     Transaminitis 05/15/2025     Priority: Medium     ANGELINA (acute kidney injury) 05/15/2025     Priority: Medium     Gastroparesis 05/13/2025     Priority: Medium     Peripheral edema 04/30/2025     Priority: Medium     Generalized abdominal pain 04/30/2025     Priority: Medium     Hyperglycemia 04/15/2025     Priority: Medium     Encounter for feeding tube placement 04/15/2025     Priority: Medium     Acute cystitis with hematuria 09/26/2022     Priority: Medium     Gastroesophageal reflux disease without esophagitis 09/26/2022     Priority: Medium     RUQ abdominal pain 09/26/2022     Priority: Medium     Weight loss 09/26/2022     Priority: Medium     Age-related osteoporosis  without current pathological fracture 11/12/2020     Priority: Medium     Gastrointestinal hemorrhage, unspecified gastrointestinal hemorrhage type 11/08/2020     Priority: Medium     Chondromalacia of left patella 07/06/2018     Priority: Medium     Nausea 11/17/2016     Priority: Medium     Adrenal insufficiency 09/15/2016     Priority: Medium     S/P hernia repair 09/15/2016     Priority: Medium     Anemia, iron deficiency 08/31/2016     Priority: Medium     Iron deficiency 08/09/2016     Priority: Medium     Odynophagia 08/02/2016     Priority: Medium     Decreased oral intake 07/06/2016     Priority: Medium     Mood disorder due to a general medical condition 03/29/2016     Priority: Medium     Hypoglycemia 03/15/2016     Priority: Medium     Hypothyroidism 04/23/2015     Priority: Medium     Exocrine pancreatic insufficiency 03/05/2015     Priority: Medium     CMC DJD(carpometacarpal degenerative joint disease), localized primary 02/12/2015     Priority: Medium     CIERRA (generalized anxiety disorder) 08/06/2014     Priority: Medium     Major depressive disorder, recurrent episode, moderate (H) 08/06/2014     Priority: Medium     Abdominal muscle strain 08/05/2014     Priority: Medium     Migraine 07/16/2014     Priority: Medium     Problem list name updated by automated process. Provider to review       Type 1 diabetes mellitus with hypoglycemia and without coma (H) 12/17/2013     Priority: Medium     Problem list name updated by automated process. 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     Priority: Medium     Islet Auto Transplant-5,000 + IE/KG Pathology- fat necrosis  and fatty infiltration 01/16/2012     Priority: Medium       PERTINENT PAST MEDICAL HISTORY:  Past Medical History:   Diagnosis Date     Chronic abdominal pain      Chronic pancreatitis (H)     S/P pancreatectomy     Depression with anxiety      Gastro-oesophageal reflux disease      Gastroparesis 05/13/2025     Hypothyroidism 04/23/2015     Kidney stones      Low serum cortisol level      Migraines      Other chronic pain     STOMACH     Other chronic pain     LUMBAR SPINE     Peripheral neuropathy      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    TPIAT     Spasm of sphincter of Oddi        PREVIOUS SURGERIES:  Past Surgical History:   Procedure Laterality Date     ARTHROPLASTY CARPOMETACARPAL (THUMB JOINT)  05/02/2014    Procedure: ARTHROPLASTY CARPOMETACARPAL (THUMB JOINT);  Surgeon: Carina Panda MD;  Location: [facility]     CHOLECYSTECTOMY  01/01/2004     COLONOSCOPY  07/18/2014    Procedure: COLONOSCOPY;  Surgeon: Aurora Sahu MD;  Location: [facility]     COLONOSCOPY N/A 08/01/2017    Procedure: COLONOSCOPY;  Colonoscopy and upper endoscopy;  Surgeon: Deirdre Harris MD;  [facility]: [facility]     ENDOSCOPIC INSERTION TUBE JEJUNOSTOMY N/A 5/2/2025    Procedure: Esophagogastroduodenoscopy, Jejunopexy, JEJUNOSTOMY TUBE PLACEMENT, GASTROSTOMY TUBE EXCHANGE;  Surgeon: Jose Francisco Perdomo MD;  [facility]: [facility]     ENDOSCOPIC RETROGRADE CHOLANGIOPANCREATOGRAM       ENDOSCOPIC RETROGRADE CHOLANGIOPANCREATOGRAM  04/19/2011    Procedure:ENDOSCOPIC RETROGRADE CHOLANGIOPANCREATOGRAM; Pancreatic Stent Placement       ENDOSCOPIC RETROGRADE CHOLANGIOPANCREATOGRAM  05/26/2011    Procedure:ENDOSCOPIC RETROGRADE CHOLANGIOPANCREATOGRAM; with Pancreatic Stent Removal; Surgeon:DALE MIMS; Location:[facility]     ENDOSCOPY UPPER, COLONOSCOPY, COMBINED  04/25/2012    Procedure:COMBINED ENDOSCOPY UPPER, COLONOSCOPY; Enteroscopy with Bile Duct Stent Removal, Colonoscopy  *Latex Safe Room*;  Surgeon:GRACY GODWIN; Location[facility]     ESOPHAGOSCOPY, GASTROSCOPY, DUODENOSCOPY (EGD), COMBINED  05/26/2011    Procedure:COMBINED ESOPHAGOSCOPY, GASTROSCOPY, DUODENOSCOPY (EGD); Surgeon:DALE MIMS; [facility]     ESOPHAGOSCOPY, GASTROSCOPY, DUODENOSCOPY (EGD), COMBINED N/A 10/30/2014    Procedure: COMBINED ESOPHAGOSCOPY, GASTROSCOPY, DUODENOSCOPY (EGD), BIOPSY SINGLE OR MULTIPLE;  Surgeon: Sarai Moon MD;  Location:  GI     ESOPHAGOSCOPY, GASTROSCOPY, DUODENOSCOPY (EGD), COMBINED Left 07/06/2015    Procedure: COMBINED ESOPHAGOSCOPY, GASTROSCOPY, DUODENOSCOPY (EGD), BIOPSY SINGLE OR MULTIPLE;  Surgeon: Thomas Estrada MD;  Location:  GI     ESOPHAGOSCOPY, GASTROSCOPY, DUODENOSCOPY (EGD), COMBINED N/A 07/08/2016    Procedure: COMBINED ESOPHAGOSCOPY, GASTROSCOPY, DUODENOSCOPY (EGD), BIOPSY SINGLE OR MULTIPLE;  Surgeon: Eloy Klein MD;  Location:  GI     ESOPHAGOSCOPY, GASTROSCOPY, DUODENOSCOPY (EGD), COMBINED N/A 08/04/2016    Procedure: COMBINED ESOPHAGOSCOPY, GASTROSCOPY, DUODENOSCOPY (EGD), BIOPSY SINGLE OR MULTIPLE;  Surgeon: Jason Brown MD;  Location:  GI     ESOPHAGOSCOPY, GASTROSCOPY, DUODENOSCOPY (EGD), COMBINED N/A 08/01/2017    Procedure: COMBINED ESOPHAGOSCOPY, GASTROSCOPY, DUODENOSCOPY (EGD);;  Surgeon: Deirdre Harris MD;  Location:  GI     ESOPHAGOSCOPY, GASTROSCOPY, DUODENOSCOPY (EGD), COMBINED N/A 06/12/2019    Procedure: ESOPHAGOGASTRODUODENOSCOPY (EGD);  Surgeon: Jose Francisco Perdomo MD;  Location:  GI     GYN SURGERY      Hysterectomy and USO     [facility] ENDOSCOPY W EUS  07/20/2011    Procedure:COMBINED ENDOSCOPIC ULTRASOUND, ESOPHAGOSCOPY, GASTROSCOPY, DUODENOSCOPY (EGD); Surgeon:DARVIN DONOHUE; [facility]     HERNIORRHAPHY VENTRAL N/A 09/15/2016    Procedure: HERNIORRHAPHY VENTRAL;  Surgeon: Juanita Bernabe MD;  [facility]     HYSTERECTOMY  1997 or 1998    USO     INCISION AND  DRAINAGE ABDOMEN WASHOUT, COMBINED  08/16/2012    Procedure: COMBINED INCISION AND DRAINAGE ABDOMEN WASHOUT;  ,debridement and Drainage Post Appendectomy;  Surgeon: Ron Austin MD;  [facility]     INJECT TRANSVERSUS ABDOMINIS PLANE (TAP) BLOCK BILATERAL Bilateral 05/26/2016    Procedure: INJECT TRANSVERSUS ABDOMINIS PLANE (TAP) BLOCK BILATERAL;  Surgeon: Leonard Mccallum MD;  [facility]     IR NG TUBE PLACEMENT REQ RAD & FLUORO  12/04/2017     LAPAROSCOPIC APPENDECTOMY  07/30/2012    Procedure: LAPAROSCOPIC APPENDECTOMY;  Open Appendectomy;  Surgeon: Ron Austin MD;  [facility]     PANCREATECTOMY, TRANSPLANT AUTO ISLET CELL, COMBINED  01/06/2012    Procedure:COMBINED PANCREATECTOMY, TRANSPLANT AUTO ISLET CELL; Total  Pancreatectomy, Auto Islet Transplant, splenectomy, 18fr. transgastric-jejunal feeding tube placement, liver biopsy; Surgeon:PALAK LEE; [facility]     PICC DOUBLE LUMEN PLACEMENT Right 04/19/2025    5FR DL PICC, brachial medial vein. L-38cm, 3cm out.     PICC INSERTION - DOUBLE LUMEN Right 04/30/2025    39-3cm, Medial brachial vein     REPLACE GASTROSTOMY TUBE, PERCUTANEOUS N/A 08/30/2017    Procedure: REPLACE GASTROSTOMY TUBE, PERCUTANEOUS;  GJ Tube Change;  Surgeon: Jose Nath PA-C;  [facility]: [facility]     SPLENECTOMY         SOCIAL HISTORY:  Social History     Socioeconomic History     Marital status:      Spouse name: Not on file     Number of children: Not on file     Years of education: Not on file     Highest education level: Not on file   Occupational History     Not on file   Tobacco Use     Smoking status: Never     Smokeless tobacco: Never   Vaping Use     Vaping status: Never Used   Substance and Sexual Activity     Alcohol use: No     Alcohol/week: 0.0 standard drinks of alcohol     Drug use: No     Sexual activity: Yes   Other Topics Concern     Parent/sibling w/ CABG, MI or angioplasty before 65F 55M? Not Asked   Social  History Narrative     Not on file     Social Drivers of Health     Financial Resource Strain: Low Risk  (5/15/2025)    Financial Resource Strain      Within the past 12 months, have you or your family members you live with been unable to get utilities (heat, electricity) when it was really needed?: No   Food Insecurity: Low Risk  (5/15/2025)    Food Insecurity      Within the past 12 months, did you worry that your food would run out before you got money to buy more?: No      Within the past 12 months, did the food you bought just not last and you didn t have money to get more?: No   Recent Concern: Food Insecurity - High Risk (4/16/2025)    Food Insecurity      Within the past 12 months, did you worry that your food would run out before you got money to buy more?: Yes      Within the past 12 months, did the food you bought just not last and you didn t have money to get more?: Yes   Transportation Needs: Low Risk  (5/15/2025)    Transportation Needs      Within the past 12 months, has lack of transportation kept you from medical appointments, getting your medicines, non-medical meetings or appointments, work, or from getting things that you need?: No   Physical Activity: Not on file   Stress: Not on file   Social Connections: Not on file   Interpersonal Safety: Low Risk  (5/15/2025)    Interpersonal Safety      Do you feel physically and emotionally safe where you currently live?: Yes      Within the past 12 months, have you been hit, slapped, kicked or otherwise physically hurt by someone?: No      Within the past 12 months, have you been humiliated or emotionally abused in other ways by your partner or ex-partner?: No   Housing Stability: Low Risk  (5/15/2025)    Housing Stability      Do you have housing? : Yes      Are you worried about losing your housing?: No       FAMILY HISTORY:  Family History   Problem Relation Age of Onset     Hypertension Mother      Diabetes Mother      Osteoporosis Mother      Cancer  "Father         pancreatic cancer     Diabetes Maternal Grandmother      Cardiovascular Maternal Grandmother      Cancer Maternal Grandfather         lung cancer     Cancer Sister         brain     Cancer Sister         liver cancer       Past/family/social history reviewed and no changes    PHYSICAL EXAMINATION:  Constitutional: aaox3, cooperative, pleasant, not dyspneic/diaphoretic, no acute distress  Vitals reviewed: /79 (BP Location: Left arm, Patient Position: Sitting, Cuff Size: Adult Small)   Pulse 95   Ht 1.575 m (5' 2\")   Wt 46.3 kg (102 lb)   SpO2 100%   BMI 18.66 kg/m    Wt:   Wt Readings from Last 2 Encounters:   06/18/25 46.3 kg (102 lb)   06/09/25 45.6 kg (100 lb 8 oz)      Eyes: Sclera anicteric/injected  CV: No edema  Respiratory: Unlabored breathing  Skin: warm, perfused, no jaundice  Psych: Normal affect  MSK: Normal gait                      Again, thank you for allowing me to participate in the care of your patient.        Sincerely,        Joey Feldman PA-C    Electronically signed"

## 2025-06-18 NOTE — TELEPHONE ENCOUNTER
6/18 Called and spoke to patient, appointment is currently scheduled.     [facility]   Orthopedics, Podiatry, Sports Medicine, Ent ,Eye , Audiology, Adult Endocrine & Diabetes, Nutrition & Medication Therapy Management Specialties   [facility]

## 2025-06-20 ENCOUNTER — TELEPHONE (OUTPATIENT)
Dept: INTERNAL MEDICINE | Facility: CLINIC | Age: 60
End: 2025-06-20
Payer: MEDICARE

## 2025-06-20 NOTE — TELEPHONE ENCOUNTER
[facility]    Phone Message    May a detailed message be left on voicemail: yes     Reason for Call: Other: Samantha calling to report patients weight today is 84 pounds decreased from last week patient weighed 104. Samantha states patients blood sugar this morning fasting was 400. Patients temp during visit was 99. Patient gave a 9 out of 10 pain scale abdominal pain and back . Samantha stated patient didn't want to go to er and that her pain is chronic and she has gestational diabetes and has a referral to pain clinic. Week of June 30 patient is due for a re cert for home care skilled nursing 1 time a week for 5 weeks order is needed.     Action Taken: Message routed to:  [facility]    Travel Screening: Not Applicable     Date of Service:

## 2025-06-23 ENCOUNTER — TELEPHONE (OUTPATIENT)
Dept: INTERNAL MEDICINE | Facility: CLINIC | Age: 60
End: 2025-06-23
Payer: MEDICARE

## 2025-06-23 NOTE — TELEPHONE ENCOUNTER
[facility]    Phone Message    May a detailed message be left on voicemail: yes     Reason for Call: Orders physical therapy once a week for five weeks, once every other week for four weeks. Patient is also reported 9 out of 10 pain. Patient is able to get up and walk around and do things.     Action Taken: Message routed to:  [facility]    Travel Screening: Not Applicable     Date of Service:

## 2025-06-23 NOTE — TELEPHONE ENCOUNTER
Spoke to Samantha - [facility]. Samantha saw patient today 6/23/25, patients BG are still elevated, patient weight was 94 lb's today increased from last week of 84lbs.  Patient is still reporting 9 out of 10 abdominal pain and back . Samantha stated patient declined the ED and that her pain is chronic.  Per Samantha patient is aware of Dr. Morgan increasing her medication  droNABinol (MARINOL) 2.5 MG capsule on 6/13/25 to  Route: Take 2 capsules (5 mg) by mouth 2 times daily as needed and is taking the medication as prescribed. Patient has an upcoming appointment with the pain clinic on 7/29/25, Dr. Morgan on 7/14/25 and Endo 8/4/25.     Samantha is also requesting Home care orders 1 visit a week for 1 week, 2 visits a week for 2 weeks, then weekly for 5 weeks - Writer gave verbal OK for home care visits.     Adrianna Stephens RN on 6/23/2025 at 12:51 PM

## 2025-06-23 NOTE — TELEPHONE ENCOUNTER
LOV 06/09/2025      Called [facility] and left a secure VM. Gave verbal orders per Dr Morgan for  Orders physical therapy once a week for five weeks, once every other week for four weeks.       Kieran Leon CMA ([facility]) at 3:43 PM on 6/23/2025

## 2025-06-26 ENCOUNTER — MEDICAL CORRESPONDENCE (OUTPATIENT)
Dept: HEALTH INFORMATION MANAGEMENT | Facility: CLINIC | Age: 60
End: 2025-06-26

## 2025-07-07 ENCOUNTER — TELEPHONE (OUTPATIENT)
Dept: INTERNAL MEDICINE | Facility: CLINIC | Age: 60
End: 2025-07-07
Payer: MEDICARE

## 2025-07-07 NOTE — TELEPHONE ENCOUNTER
[facility] Call [facility]    Phone Message    May a detailed message be left on voicemail: yes     Reason for Call: Other: feeding tube fell out on 7/3/25.  MG replaced.  Has been draining all weekend.  This is G-Tube.  She thinks it has been placed incorrectly     Action Taken: Other: [facility]cc    Travel Screening: Not Applicable     Date of Service:

## 2025-07-07 NOTE — TELEPHONE ENCOUNTER
Spoke to patient, her tube feeding was replaced on 7/3/25 by [facility], the tube has been leaking since.  Patient contacted [facility] and is scheduled to have the feeding tube replaced today 7/7/25.    Adrianna Stephens RN on 7/7/2025 at 10:32 AM

## 2025-07-09 ENCOUNTER — NURSE TRIAGE (OUTPATIENT)
Dept: NURSING | Facility: CLINIC | Age: 60
End: 2025-07-09
Payer: MEDICARE

## 2025-07-09 NOTE — TELEPHONE ENCOUNTER
I do not provide pain medication over the phone.  And given her overall situation is highly unlikely I would ever prescribe an opioid.  She needs to be seen.  \JL        Nurse Triage SBAR    Is this a 2nd Level Triage? YES, LICENSED PRACTITIONER REVIEW IS REQUIRED    Situation:  Abd pain, back pain.  Asking for rx: oxycodone, has none left.  Nausea, vomiting.  Recommended ER, pt acknowledge recommendation but did not agree. Pt asked that PCP be notified, asking for pain meds ( oxycodone) to get pain under control.    Background: clinic 6-9-25  ASSESSMENT  1 Chronic abdominal pain elevated CEA  2 History Venessa-en-Y (2012) s/p  G-tube and J tube placement for nutrition  3 Idiopathic chronic pancreatitis status post  TPIAT in 2012   4 Post-pancreatectomy diabetes poorly controlled followed by endocrinology  5 History Malnutrition with J-tube feeding  6 history of adrenal insufficiency  7 Hypothyroidism appears under replaced  8 History depression and anxiety  9 Telogen effluvium    Assessment:   Home care RN, Samantha, and pt on phone answering questions.    Had J tube replaced/exchanged on Monday 7-7-25 ( at [facility]).    J tube for Continuous feeding   G tube patent, for back up  if needed for meds  Pain back and abd  #9 out 10, increased last couple days. Stabbing, sharp     Out of Oxycodone: 16 tabs gone,   Oxycodone Last 1- 1.5  hours, # 7 but then goes back up.  Asking for more pain meds to get through til pain clinic appt at end of July.  States has chronic pain    On two antibiotics for infected J tube site  Cephalexin 500 mg cap- 1 tab three times  day for 10 days  Bactrim DS 1 twice  a day,  10 days  Today: drainage- brownish tan/red ting, skin red  No fever    Stool this AM formed.  Nausea, vomiting, abd bloating  Has had 3 emesis today, last at 11 AM  States she was instructed by PCP and GI to  keep feeding going unless extreme N/V-       oxyCODONE (ROXICODONE) 5 MG tablet 16 tablet 0 5/24/2025 -- No   Sig -  "Route: Take 1-2 tablets (5-10 mg) by mouth every 6 hours as needed for pain. Take the lowest possible dose to control your pain, and use non-opioid pain options first. Decrease the number of pills you take each day after you discharge until you are able to stop completely. - Oral     Rosy Basilio MD [facility]         Protocol Recommended Disposition:   Go to ED Now  Pt acknowledge recommendation but did not agree. Pt asked that PCP be notified, asking for pain meds ( oxycodone) to get pain under control.    Recommendation:   High priority note to [facility] clinic for call back to pt w/ plan.  Recommended ER, pt acknowledge recommendation but did not agree. Pt asked that PCP be notified, asking for pain meds ( oxycodone) to get pain under control.  Pt # [phone]  Pharm: [facility]          Reason for Disposition   SEVERE abdominal pain (e.g., excruciating)    Additional Information   Negative: Followed an abdomen (stomach) injury   Negative: Chest pain   Negative: Abdominal pain and pregnant < 20 weeks   Negative: Abdominal pain and pregnant 20 or more weeks   Negative: Pain is mainly in upper abdomen (if needed ask: 'is it mainly above the belly button?')   Negative: Abdomen bloating or swelling are main symptoms    Answer Assessment - Initial Assessment Questions  1. LOCATION: \"Where does it hurt?\"      Home care RN, Samantha, and pt on phone answering questions.    Had J tube replaced/exchanged on Monday 7-7-25 ( at [facility]).    J tube for Continuous feeding   G tube patent, for back up  if needed for meds  Pain back and abd  #9 out 10, increased last couple days. Stabbing, sharp     Out of Oxycodone: 16 tabs gone,   Oxycodone Last 1- 1.5  hours, # 7 but then goes back up.  Asking for more pain meds to get through til pain clinic appt at end of July.    States has chronic pain        On two antibiotics for infected J tube site    Cephalexin 500 mg cap- 1 tab three times  day for 10 days  Bactrim DS 1 " "twice  a day,  10 days     Today: drainage- brownish tan/red ting, skin red    Stool this AM formed.  Nausea, vomiting, abd bloating  Has had 3 emesis today, last at 11 AM  States she was instructed by  PCP and GI to  keep feeding going unless extreme N/V-     No fever    2. RADIATION: \"Does the pain shoot anywhere else?\" (e.g., chest, back)     Abd pain- back pain    3. ONSET: \"When did the pain begin?\" (e.g., minutes, hours or days ago)       Continuous pain , increased last few days    4. SUDDEN: \"Gradual or sudden onset?\"      Sudden, sudden when tube fell out,    On two antibiotics for infected J tube site    Cephalexin 500 mg cap- 1 tab three times  day for 10 days  Bactrim DS 1 twice  a day,  10 days     Today: drainage- brownish tan/red ting, skin red    Stool this AM formed.  Nausea, vomiting, abd bloating  Has had 3 emesis today, last at 11 AM  States she was instructed to  keep feeding going unless extreme N/V-     No fever      5. PATTERN \"Does the pain come and go, or is it constant?\"      constant  6. SEVERITY: \"How bad is the pain?\"  (e.g., Scale 1-10; mild, moderate, or severe)     Mod- Severe    7. RECURRENT SYMPTOM: \"Have you ever had this type of stomach pain before?\" If Yes, ask: \"When was the last time?\" and \"What happened that time?\"           8. CAUSE: \"What do you think is causing the stomach pain?\"      Tube exchange, infected site    9. RELIEVING/AGGRAVATING FACTORS: \"What makes it better or worse?\" (e.g., antacids, bending or twisting motion, bowel movement)        10. OTHER SYMPTOMS: \"Do you have any other symptoms?\" (e.g., back pain, diarrhea, fever, urination pain, vomiting)    Nausea, vomiting, abd bloating    Has had 3 emesis today, last at 11 AM  States she was instructed to  keep feeding going unless extreme N/V-     No fever  11. PREGNANCY: \"Is there any chance you are pregnant?\" \"When was your last menstrual period?\"    Protocols used: Abdominal Pain - Female-A-OH    "

## 2025-07-09 NOTE — TELEPHONE ENCOUNTER
"Left a message for patient receommended she be seen urgently at the ED due to her pain.     Per Dr. Morgan \"I do not provide pain medication over the phone.  And given her overall situation is highly unlikely I would ever prescribe an opioid.  She needs to be seen.  \JL\"      Adrianna Stephens RN on 7/9/2025 at 3:34 PM  "

## 2025-07-09 NOTE — TELEPHONE ENCOUNTER
Patient called in with Samantha from [facility] back pain abdominal pain 9-10 pain scale transferred to red flag triage.

## 2025-07-15 DIAGNOSIS — Z53.9 DIAGNOSIS NOT YET DEFINED: Primary | ICD-10-CM

## 2025-07-21 ENCOUNTER — DOCUMENTATION ONLY (OUTPATIENT)
Dept: INTERNAL MEDICINE | Facility: CLINIC | Age: 60
End: 2025-07-21
Payer: MEDICARE

## 2025-07-21 NOTE — PROGRESS NOTES
Type of Form Received: [facility] [id number]    Form Received (Date) 7/15/25   Form Filled out Yes, faxed 7/17   Placed in provider folder Yes

## 2025-07-23 ENCOUNTER — TELEPHONE (OUTPATIENT)
Dept: GASTROENTEROLOGY | Facility: CLINIC | Age: 60
End: 2025-07-23
Payer: MEDICARE

## 2025-07-23 ENCOUNTER — HOME INFUSION BILLING (OUTPATIENT)
Dept: HOME HEALTH SERVICES | Facility: HOME HEALTH | Age: 60
End: 2025-07-23
Payer: MEDICARE

## 2025-07-26 ENCOUNTER — OFFICE VISIT (OUTPATIENT)
Dept: INTERNAL MEDICINE | Facility: CLINIC | Age: 60
End: 2025-07-26
Payer: MEDICARE

## 2025-07-26 VITALS
RESPIRATION RATE: 16 BRPM | HEART RATE: 104 BPM | SYSTOLIC BLOOD PRESSURE: 121 MMHG | DIASTOLIC BLOOD PRESSURE: 80 MMHG | OXYGEN SATURATION: 96 %

## 2025-07-26 DIAGNOSIS — K59.00 CONSTIPATION, UNSPECIFIED CONSTIPATION TYPE: ICD-10-CM

## 2025-07-26 DIAGNOSIS — R10.11 RUQ ABDOMINAL PAIN: ICD-10-CM

## 2025-07-26 DIAGNOSIS — R63.4 WEIGHT LOSS: ICD-10-CM

## 2025-07-26 DIAGNOSIS — K86.81 EXOCRINE PANCREATIC INSUFFICIENCY: ICD-10-CM

## 2025-07-26 DIAGNOSIS — M54.9 CHRONIC BACK PAIN, UNSPECIFIED BACK LOCATION, UNSPECIFIED BACK PAIN LATERALITY: ICD-10-CM

## 2025-07-26 DIAGNOSIS — G89.29 CHRONIC BACK PAIN, UNSPECIFIED BACK LOCATION, UNSPECIFIED BACK PAIN LATERALITY: ICD-10-CM

## 2025-07-26 DIAGNOSIS — E27.40 ADRENAL INSUFFICIENCY: ICD-10-CM

## 2025-07-26 DIAGNOSIS — E03.9 HYPOTHYROIDISM, UNSPECIFIED TYPE: Primary | ICD-10-CM

## 2025-07-26 DIAGNOSIS — F51.01 PRIMARY INSOMNIA: ICD-10-CM

## 2025-07-26 DIAGNOSIS — D50.9 IRON DEFICIENCY ANEMIA, UNSPECIFIED IRON DEFICIENCY ANEMIA TYPE: ICD-10-CM

## 2025-07-26 DIAGNOSIS — E10.649 TYPE 1 DIABETES MELLITUS WITH HYPOGLYCEMIA AND WITHOUT COMA (H): ICD-10-CM

## 2025-07-26 DIAGNOSIS — R53.81 PHYSICAL DECONDITIONING: ICD-10-CM

## 2025-07-26 PROCEDURE — 99214 OFFICE O/P EST MOD 30 MIN: CPT | Performed by: INTERNAL MEDICINE

## 2025-07-26 PROCEDURE — 3074F SYST BP LT 130 MM HG: CPT | Performed by: INTERNAL MEDICINE

## 2025-07-26 PROCEDURE — 3079F DIAST BP 80-89 MM HG: CPT | Performed by: INTERNAL MEDICINE

## 2025-07-26 RX ORDER — DRONABINOL 5 MG/1
5 CAPSULE ORAL
Qty: 60 CAPSULE | Refills: 3 | Status: SHIPPED | OUTPATIENT
Start: 2025-07-26

## 2025-07-26 RX ORDER — GABAPENTIN 400 MG/1
CAPSULE ORAL
Qty: 360 CAPSULE | Refills: 3 | Status: SHIPPED | OUTPATIENT
Start: 2025-07-26 | End: 2025-07-28

## 2025-07-26 RX ORDER — TRAZODONE HYDROCHLORIDE 150 MG/1
150 TABLET ORAL AT BEDTIME
Qty: 90 TABLET | Refills: 3 | Status: SHIPPED | OUTPATIENT
Start: 2025-07-26

## 2025-07-26 NOTE — PROGRESS NOTES
Chantell is a 59 year old that presents in clinic today for the following:     Chief Complaint   Patient presents with    Follow Up     1 month follow up    Pain    Results     Lab results           7/26/2025    11:25 AM   Additional Questions   Roomed by zelalem Cobb from encounters over the past 10 days    No data recorded       Abdon Logan at 11:29 AM on 7/26/2025

## 2025-07-26 NOTE — PROGRESS NOTES
HPI  59-year-old returns today for reevaluation.  She apparently never did start the Marinol.  She continues to have ongoing issues with abdominal pain as well as burning hands and feet.  Is not associated with any skin breakdown or skin changes.  She is tolerating her tube feedings well she is monitoring her blood sugar with a CGM but the pump is currently disconnected and she is giving insulin every 4 hours.  She still running very high and we discussed that she is not likely going to feel better or gained significant weight until her diabetes is under better control.  She is working with her endocrinologist and diabetic education to this and.  She is sleeping poorly despite 100 mg of trazodone and is looking for additional help in that regard.  She has missed her appointment with the pain clinic.  She is scheduled for a colonoscopy in September to evaluate her abdominal pain and the elevated CEA  Past Medical History:   Diagnosis Date    Bone and joint disord back, pelvis, leg complicat preg, childb, puerp 2010    Chronic abdominal pain     Chronic headache     Chronic pancreatitis (H)     S/P pancreatectomy    Depression with anxiety     Earache or other ear, nose, or throat complaint 2006    Fracture 2003    Gallbladder problem 2004    Gastro-oesophageal reflux disease     Gastroparesis 05/13/2025    Hearing problem 2008    Hypothyroidism 04/23/2015    Kidney stones     Low serum cortisol level     Migraines     Other bladder disorder 2000    Other chronic pain     STOMACH    Other chronic pain     LUMBAR SPINE    Peripheral neuropathy     Pneumonia 1980    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    TPIAT    Spasm of sphincter of Oddi     Stomach problems 2000    Transplant Islet Transplant    Uncomplicated asthma 1980    Urinary tract infection 2000    Vision disorder 2000     Past Surgical History:   Procedure Laterality Date    ARTHROPLASTY CARPOMETACARPAL (THUMB JOINT)  05/02/2014    Procedure:  ARTHROPLASTY CARPOMETACARPAL (THUMB JOINT);  Surgeon: Carina Panda MD;  Location: [facility]    BACK SURGERY  1/6/2012    removed during Transplant    BIOPSY      BREAST SURGERY  05/2015    CHOLECYSTECTOMY  01/01/2004    COLONOSCOPY  07/18/2014    Procedure: COLONOSCOPY;  Surgeon: Aurora Sahu MD;  [facility]    COLONOSCOPY N/A 08/01/2017    Procedure: COLONOSCOPY;  Colonoscopy and upper endoscopy;  Surgeon: Deirdre Harris MD;  [facility]    ENDOSCOPIC INSERTION TUBE JEJUNOSTOMY N/A 05/02/2025    Procedure: Esophagogastroduodenoscopy, Jejunopexy, JEJUNOSTOMY TUBE PLACEMENT, GASTROSTOMY TUBE EXCHANGE;  Surgeon: Jose Francisco Perdomo MD;  [facility]    ENDOSCOPIC RETROGRADE CHOLANGIOPANCREATOGRAM      ENDOSCOPIC RETROGRADE CHOLANGIOPANCREATOGRAM  04/19/2011    Procedure:ENDOSCOPIC RETROGRADE CHOLANGIOPANCREATOGRAM; Pancreatic Stent Placement      ENDOSCOPIC RETROGRADE CHOLANGIOPANCREATOGRAM  05/26/2011    Procedure:ENDOSCOPIC RETROGRADE CHOLANGIOPANCREATOGRAM; with Pancreatic Stent Removal; Surgeon:DALE IMMS; Location:[facility]    ENDOSCOPY UPPER, COLONOSCOPY, COMBINED  04/25/2012    Procedure:COMBINED ENDOSCOPY UPPER, COLONOSCOPY; Enteroscopy with Bile Duct Stent Removal, Colonoscopy  *Latex Safe Room*; Surgeon:GRACY GODWINIQ; [facility]    ESOPHAGOSCOPY, GASTROSCOPY, DUODENOSCOPY (EGD), COMBINED  05/26/2011    Procedure:COMBINED ESOPHAGOSCOPY, GASTROSCOPY, DUODENOSCOPY (EGD); Surgeon:DALE MIMS; [facility]    ESOPHAGOSCOPY, GASTROSCOPY, DUODENOSCOPY (EGD), COMBINED N/A 10/30/2014    Procedure: COMBINED ESOPHAGOSCOPY, GASTROSCOPY, DUODENOSCOPY (EGD), BIOPSY SINGLE OR MULTIPLE;  Surgeon: Sarai Moon MD;  [facility]    ESOPHAGOSCOPY, GASTROSCOPY, DUODENOSCOPY (EGD), COMBINED Left 07/06/2015    Procedure: COMBINED ESOPHAGOSCOPY, GASTROSCOPY, DUODENOSCOPY (EGD), BIOPSY SINGLE OR MULTIPLE;  Surgeon: Thomas Estrada  MD Daniel;  [facility]    ESOPHAGOSCOPY, GASTROSCOPY, DUODENOSCOPY (EGD), COMBINED N/A 07/08/2016    Procedure: COMBINED ESOPHAGOSCOPY, GASTROSCOPY, DUODENOSCOPY (EGD), BIOPSY SINGLE OR MULTIPLE;  Surgeon: Eloy Klein MD;  [facility]    ESOPHAGOSCOPY, GASTROSCOPY, DUODENOSCOPY (EGD), COMBINED N/A 08/04/2016    Procedure: COMBINED ESOPHAGOSCOPY, GASTROSCOPY, DUODENOSCOPY (EGD), BIOPSY SINGLE OR MULTIPLE;  Surgeon: Jason Brown MD;  [facility]    ESOPHAGOSCOPY, GASTROSCOPY, DUODENOSCOPY (EGD), COMBINED N/A 08/01/2017    Procedure: COMBINED ESOPHAGOSCOPY, GASTROSCOPY, DUODENOSCOPY (EGD);;  Surgeon: Deirdre Harris MD;  Location:  GI    ESOPHAGOSCOPY, GASTROSCOPY, DUODENOSCOPY (EGD), COMBINED N/A 06/12/2019    Procedure: ESOPHAGOGASTRODUODENOSCOPY (EGD);  Surgeon: Jose Francisco Perdomo MD;  [facility]    GYN SURGERY      Hysterectomy and USO    [facility] ENDOSCOPY W EUS  07/20/2011    Procedure:COMBINED ENDOSCOPIC ULTRASOUND, ESOPHAGOSCOPY, GASTROSCOPY, DUODENOSCOPY (EGD); Surgeon:DARVIN DONOHUE; Location: GI    HERNIORRHAPHY VENTRAL N/A 09/15/2016    Procedure: HERNIORRHAPHY VENTRAL;  Surgeon: Juanita Bernabe MD;  [facility]    HYSTERECTOMY  1997 or 1998    USO    INCISION AND DRAINAGE ABDOMEN WASHOUT, COMBINED  08/16/2012    Procedure: COMBINED INCISION AND DRAINAGE ABDOMEN WASHOUT;  ,debridement and Drainage Post Appendectomy;  Surgeon: Ron Austin MD;  [facility]    INJECT TRANSVERSUS ABDOMINIS PLANE (TAP) BLOCK BILATERAL Bilateral 05/26/2016    Procedure: INJECT TRANSVERSUS ABDOMINIS PLANE (TAP) BLOCK BILATERAL;  Surgeon: Leonard Mccallum MD;  [facility]    IR NG TUBE PLACEMENT REQ RAD & FLUORO  12/04/2017    IR NG TUBE PLACEMENT REQ RAD & FLUORO  07/07/2025    IR NG TUBE PLACEMENT REQ RAD & FLUORO  07/03/2025    LAPAROSCOPIC APPENDECTOMY  07/30/2012    Procedure: LAPAROSCOPIC APPENDECTOMY;  Open Appendectomy;  Surgeon:  Norman, Ron Chandra MD;  [facility]    PANCREATECTOMY, TRANSPLANT AUTO ISLET CELL, COMBINED  01/06/2012    Procedure:COMBINED PANCREATECTOMY, TRANSPLANT AUTO ISLET CELL; Total  Pancreatectomy, Auto Islet Transplant, splenectomy, 18fr. transgastric-jejunal feeding tube placement, liver biopsy; Surgeon:PALAK LEE; [facility]    PICC DOUBLE LUMEN PLACEMENT Right 04/19/2025    5FR DL PICC, brachial medial vein. L-38cm, 3cm out.    PICC INSERTION - DOUBLE LUMEN Right 04/30/2025    39-3cm, Medial brachial vein    HI STOMACH SURGERY PROCEDURE UNLISTED      REPLACE GASTROSTOMY TUBE, PERCUTANEOUS N/A 08/30/2017    Procedure: REPLACE GASTROSTOMY TUBE, PERCUTANEOUS;  GJ Tube Change;  Surgeon: Jose Nath PA-C;  [facility]    SOFT TISSUE SURGERY  05/0412    SPLENECTOMY      TRANSPLANT  1/6/2012    auto Islet transplant     Family History   Problem Relation Age of Onset    Hypertension Mother     Diabetes Mother     Osteoporosis Mother     Cerebrovascular Disease Mother     Breast Cancer Mother     Depression Mother     Anxiety Disorder Mother     Heart Disease Mother     Cancer Mother     Arthritis Mother     Cancer Father         pancreatic cancer    Other Cancer Father     Asthma Father     Thyroid Disease Father     Diabetes Maternal Grandmother     Cardiovascular Maternal Grandmother     Coronary Artery Disease Maternal Grandmother     Hypertension Maternal Grandmother     Cerebrovascular Disease Maternal Grandmother     Breast Cancer Maternal Grandmother     Depression Maternal Grandmother     Osteoporosis Maternal Grandmother     Obesity Maternal Grandmother     Heart Disease Maternal Grandmother     Allergies Maternal Grandmother     Cancer Maternal Grandfather         lung cancer    Cancer Sister         brain    Hypertension Sister     Cancer Sister         liver cancer    Alcohol/Drug Paternal Grandfather     Alzheimer Disease Paternal Grandfather     Hypertension Sister      Depression Sister     Anxiety Disorder Sister     Asthma Sister     Cancer Sister     Alcohol/Drug Sister     Asthma Son     Musculoskeletal Disorder Other     Depression Maternal Half-Sister     Anxiety Disorder Maternal Half-Sister     Mental Illness Maternal Half-Sister     Substance Abuse Maternal Half-Sister     Anesthesia Reaction Maternal Half-Sister     Asthma Maternal Half-Sister     Asthma Son     Asthma Maternal Half-Sister          Exam:  /80 (BP Location: Right arm, Patient Position: Sitting, Cuff Size: Adult Small)   Pulse 104   Resp 16   LMP  (LMP Unknown)   SpO2 96%   Breastfeeding No   The patient is emaciated but alert, oriented with a clear sensorium.   Skin shows no lesions or rashes and good turgor.   Head is normocephalic and atraumatic.    Neck shows no nodes, thyromegaly.     Lungs are clear.   Heart shows normal S1 and S2 without murmur or gallop.    Extremities show no edema.        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  For sleep organ increase the gabapentin to 800 mg at bedtime and the trazodone 250 mg at bedtime.  Will reassess her labs and will plan to have her follow-up for reassessment in 4 to 6 weeks.    Over 30 minutes spent on the day of service in chart review, patient contact, record completion and review and notification of lab reports    This note was completed using Dragon voice recognition software.      Ron Morgan MD  General Internal Medicine  Primary  [facility]  [phone]

## 2025-07-28 DIAGNOSIS — R10.11 RUQ ABDOMINAL PAIN: ICD-10-CM

## 2025-07-28 DIAGNOSIS — K86.81 EXOCRINE PANCREATIC INSUFFICIENCY: ICD-10-CM

## 2025-07-28 RX ORDER — GABAPENTIN 400 MG/1
CAPSULE ORAL
Qty: 1080 CAPSULE | Refills: 3 | Status: SHIPPED | OUTPATIENT
Start: 2025-07-28 | End: 2025-07-29

## 2025-07-28 RX ORDER — GABAPENTIN 400 MG/1
CAPSULE ORAL
Qty: 1080 CAPSULE | Refills: 3 | Status: SHIPPED | OUTPATIENT
Start: 2025-07-28 | End: 2025-07-28

## 2025-07-29 ENCOUNTER — TELEPHONE (OUTPATIENT)
Dept: INTERNAL MEDICINE | Facility: CLINIC | Age: 60
End: 2025-07-29

## 2025-07-29 ENCOUNTER — PATIENT OUTREACH (OUTPATIENT)
Dept: CARE COORDINATION | Facility: CLINIC | Age: 60
End: 2025-07-29

## 2025-07-29 DIAGNOSIS — R10.11 RUQ ABDOMINAL PAIN: ICD-10-CM

## 2025-07-29 DIAGNOSIS — K86.81 EXOCRINE PANCREATIC INSUFFICIENCY: ICD-10-CM

## 2025-07-29 NOTE — TELEPHONE ENCOUNTER
"Spoke to the pharmacy regarding patients gabapentin (NEURONTIN) 400 MG capsule.  Per Dr. Mejia note from last appointment on 7/26/25 \"For sleep organ increase the gabapentin to 800 mg at bedtime and the trazodone 250 mg at bedtime.\" Pharmacy is requesting a new Rx with clearer directions.       Adrianna Stephens RN on 7/29/2025 at 4:43 PM    I am not sure what they can't understand on this but she is to take 1 twice a day and then an additional 2 at bedtime as stated in the prescription  JL  "

## 2025-07-29 NOTE — TELEPHONE ENCOUNTER
[facility] Call Center    Phone Message    May a detailed message be left on voicemail: yes     Reason for Call: Alejandrina at pharmacy is requesting clarification on how many capsule patients is to take 2x a day. Thank you     Action Taken: Message routed to:  [facility]      Travel Screening: Not Applicable     Date of Service:

## 2025-07-30 RX ORDER — GABAPENTIN 400 MG/1
CAPSULE ORAL
Qty: 1080 CAPSULE | Refills: 3 | Status: SHIPPED | OUTPATIENT
Start: 2025-07-30

## 2025-07-30 RX ORDER — GABAPENTIN 400 MG/1
CAPSULE ORAL
Qty: 1080 CAPSULE | Refills: 3 | Status: SHIPPED | OUTPATIENT
Start: 2025-07-30 | End: 2025-07-30

## 2025-07-30 NOTE — PROGRESS NOTES
Clinic Care Coordination Contact  Follow Up Progress Note      Assessment: RN called to speak with patient for monthly care coordination call. Pt shares her feeding tube fell out and she is in a lot of pain. RN asked if patient plans to go to ER and advised that patient go. Pt states that she will do that. RN informed would update PCP.     Care Gaps:    Health Maintenance Due   Topic Date Due    ADVANCE CARE PLANNING  Never done    MIGRAINE ACTION PLAN  Never done    COVID-19 VACCINE (1) Never done    MEDICARE ANNUAL WELLNESS VISIT  Never done    ZOSTER VACCINE (1 of 2) Never done    HEPATITIS A VACCINE (1 of 2 - Risk 2-dose series) Never done    MENINGITIS VACCINE (1 - Risk start 2-23 months series) 12/28/2011    URINE DRUG SCREEN  01/23/2015    DIABETIC FOOT EXAM  07/20/2017    DTAP/TDAP/TD VACCINE (3 - Td or Tdap) 11/01/2021    HEPATITIS B VACCINE (1 of 3 - Risk 3-dose series) Never done    RSV VACCINE (1 - Risk 60-74 years 1-dose series) Never done    EYE EXAM  08/12/2025    PHQ-9  08/13/2025       Postponed to next visit with PCP (currently none scheduled)     Care Plans  Care Plan: Chronic Pain       Problem: Chronic Pain is Not Self-Managed       Long-Range Goal: Demonstrate improved self-management of chronic pain       Start Date: 5/8/2025    This Visit's Progress: On track    Priority: High    Note:     Barriers: patient has chronic pain, feels it is not well managed  Strengths: patient is engaged and motivated to navigate healthcare management  Patient expressed understanding of goal: yes   Action steps to achieve this goal:  1. I will attend establish care pain management appointment.  2. I will continue to work with Dr. Morgan to manage pain as well.                               Intervention/Education provided during outreach: Discussed patients plan of care. CC asked open ended questions, provided support, resources, and encouragement as needed. Patient will reach out to care team sooner than  planned with new questions or concerns.      Plan:   Per last visit with PCP on 7/26, pt should follow up with Dr. Morgan in 4-6 weeks. RN will send message to clinic coordinators to assist with getting follow up scheduled.   Care Coordinator will follow up in 1 week.     MARCY LOVING RN on 7/30/2025 at 10:05 AM

## 2025-07-30 NOTE — TELEPHONE ENCOUNTER
Rx failed to e-prescribed - resent to [facility]    Adrianna Stephens RN on 7/30/2025 at 12:24 PM

## 2025-07-31 ENCOUNTER — APPOINTMENT (OUTPATIENT)
Dept: GENERAL RADIOLOGY | Facility: CLINIC | Age: 60
DRG: 393 | End: 2025-07-31
Attending: PHYSICIAN ASSISTANT
Payer: MEDICARE

## 2025-07-31 ENCOUNTER — TELEPHONE (OUTPATIENT)
Dept: INTERNAL MEDICINE | Facility: CLINIC | Age: 60
End: 2025-07-31
Payer: MEDICARE

## 2025-07-31 ENCOUNTER — APPOINTMENT (OUTPATIENT)
Dept: INTERVENTIONAL RADIOLOGY/VASCULAR | Facility: CLINIC | Age: 60
DRG: 393 | End: 2025-07-31
Payer: MEDICARE

## 2025-07-31 ENCOUNTER — HOSPITAL ENCOUNTER (OUTPATIENT)
Facility: CLINIC | Age: 60
Setting detail: OBSERVATION
End: 2025-07-31
Attending: EMERGENCY MEDICINE | Admitting: EMERGENCY MEDICINE
Payer: MEDICARE

## 2025-07-31 ENCOUNTER — APPOINTMENT (OUTPATIENT)
Dept: CT IMAGING | Facility: CLINIC | Age: 60
DRG: 393 | End: 2025-07-31
Attending: NURSE PRACTITIONER
Payer: MEDICARE

## 2025-07-31 VITALS
SYSTOLIC BLOOD PRESSURE: 140 MMHG | HEART RATE: 79 BPM | OXYGEN SATURATION: 98 % | DIASTOLIC BLOOD PRESSURE: 88 MMHG | RESPIRATION RATE: 18 BRPM | TEMPERATURE: 97.9 F

## 2025-07-31 DIAGNOSIS — R13.10 DYSPHAGIA, UNSPECIFIED TYPE: ICD-10-CM

## 2025-07-31 DIAGNOSIS — M25.562 LEFT KNEE PAIN, UNSPECIFIED CHRONICITY: ICD-10-CM

## 2025-07-31 DIAGNOSIS — F33.1 MAJOR DEPRESSIVE DISORDER, RECURRENT EPISODE, MODERATE (H): ICD-10-CM

## 2025-07-31 DIAGNOSIS — Z90.410 POST-PANCREATECTOMY DIABETES (H): ICD-10-CM

## 2025-07-31 DIAGNOSIS — E10.649 TYPE 1 DIABETES MELLITUS WITH HYPOGLYCEMIA AND WITHOUT COMA (H): ICD-10-CM

## 2025-07-31 DIAGNOSIS — E13.9 POST-PANCREATECTOMY DIABETES (H): ICD-10-CM

## 2025-07-31 DIAGNOSIS — T85.528A JEJUNOSTOMY TUBE FELL OUT: ICD-10-CM

## 2025-07-31 DIAGNOSIS — E89.1 POST-PANCREATECTOMY DIABETES (H): ICD-10-CM

## 2025-07-31 DIAGNOSIS — R73.9 HYPERGLYCEMIA: Primary | ICD-10-CM

## 2025-07-31 LAB
ALBUMIN SERPL BCG-MCNC: 3.9 G/DL (ref 3.5–5.2)
ALP SERPL-CCNC: 170 U/L (ref 40–150)
ALT SERPL W P-5'-P-CCNC: ABNORMAL U/L
ANION GAP SERPL CALCULATED.3IONS-SCNC: 12 MMOL/L (ref 7–15)
AST SERPL W P-5'-P-CCNC: ABNORMAL U/L
BASOPHILS # BLD AUTO: 0 10E3/UL (ref 0–0.2)
BASOPHILS NFR BLD AUTO: 0 %
BILIRUB SERPL-MCNC: 0.2 MG/DL
BUN SERPL-MCNC: 4.3 MG/DL (ref 8–23)
CALCIUM SERPL-MCNC: 9.5 MG/DL (ref 8.8–10.4)
CHLORIDE SERPL-SCNC: 92 MMOL/L (ref 98–107)
CREAT SERPL-MCNC: 0.39 MG/DL (ref 0.51–0.95)
EGFRCR SERPLBLD CKD-EPI 2021: >90 ML/MIN/1.73M2
EOSINOPHIL # BLD AUTO: 0 10E3/UL (ref 0–0.7)
EOSINOPHIL NFR BLD AUTO: 1 %
ERYTHROCYTE [DISTWIDTH] IN BLOOD BY AUTOMATED COUNT: 11.2 % (ref 10–15)
GLUCOSE BLD-MCNC: 699 MG/DL (ref 70–99)
GLUCOSE BLDC GLUCOMTR-MCNC: 285 MG/DL (ref 70–99)
GLUCOSE BLDC GLUCOMTR-MCNC: 432 MG/DL (ref 70–99)
GLUCOSE BLDC GLUCOMTR-MCNC: 496 MG/DL (ref 70–99)
GLUCOSE SERPL-MCNC: 732 MG/DL (ref 70–99)
HCO3 SERPL-SCNC: 28 MMOL/L (ref 22–29)
HCT VFR BLD AUTO: 37.4 % (ref 35–47)
HGB BLD-MCNC: 12.6 G/DL (ref 11.7–15.7)
HOLD SPECIMEN: NORMAL
IMM GRANULOCYTES # BLD: 0 10E3/UL
IMM GRANULOCYTES NFR BLD: 0 %
INR PPP: 0.97 (ref 0.85–1.15)
LACTATE SERPL-SCNC: 1.7 MMOL/L (ref 0.7–2)
LIPASE SERPL-CCNC: 7 U/L (ref 13–60)
LYMPHOCYTES # BLD AUTO: 1.5 10E3/UL (ref 0.8–5.3)
LYMPHOCYTES NFR BLD AUTO: 29 %
MAGNESIUM SERPL-MCNC: 1.8 MG/DL (ref 1.7–2.3)
MCH RBC QN AUTO: 29.6 PG (ref 26.5–33)
MCHC RBC AUTO-ENTMCNC: 33.7 G/DL (ref 31.5–36.5)
MCV RBC AUTO: 88 FL (ref 78–100)
MONOCYTES # BLD AUTO: 0.4 10E3/UL (ref 0–1.3)
MONOCYTES NFR BLD AUTO: 7 %
NEUTROPHILS # BLD AUTO: 3.2 10E3/UL (ref 1.6–8.3)
NEUTROPHILS NFR BLD AUTO: 63 %
NRBC # BLD AUTO: 0 10E3/UL
NRBC BLD AUTO-RTO: 0 /100
PHOSPHATE SERPL-MCNC: 3.1 MG/DL (ref 2.5–4.5)
PLATELET # BLD AUTO: 299 10E3/UL (ref 150–450)
POTASSIUM SERPL-SCNC: 4.5 MMOL/L (ref 3.4–5.3)
PROT SERPL-MCNC: 6.8 G/DL (ref 6.4–8.3)
PROTHROMBIN TIME: 12.9 SECONDS (ref 11.8–14.8)
RBC # BLD AUTO: 4.25 10E6/UL (ref 3.8–5.2)
SODIUM SERPL-SCNC: 132 MMOL/L (ref 135–145)
WBC # BLD AUTO: 5.1 10E3/UL (ref 4–11)

## 2025-07-31 PROCEDURE — 255N000002 HC RX 255 OP 636

## 2025-07-31 PROCEDURE — 96376 TX/PRO/DX INJ SAME DRUG ADON: CPT

## 2025-07-31 PROCEDURE — 258N000003 HC RX IP 258 OP 636: Performed by: NURSE PRACTITIONER

## 2025-07-31 PROCEDURE — 82962 GLUCOSE BLOOD TEST: CPT

## 2025-07-31 PROCEDURE — G0378 HOSPITAL OBSERVATION PER HR: HCPCS

## 2025-07-31 PROCEDURE — 99223 1ST HOSP IP/OBS HIGH 75: CPT | Mod: FS | Performed by: PHYSICIAN ASSISTANT

## 2025-07-31 PROCEDURE — 85025 COMPLETE CBC W/AUTO DIFF WBC: CPT | Performed by: NURSE PRACTITIONER

## 2025-07-31 PROCEDURE — 83605 ASSAY OF LACTIC ACID: CPT | Performed by: NURSE PRACTITIONER

## 2025-07-31 PROCEDURE — 85610 PROTHROMBIN TIME: CPT

## 2025-07-31 PROCEDURE — 250N000011 HC RX IP 250 OP 636: Performed by: NURSE PRACTITIONER

## 2025-07-31 PROCEDURE — 36415 COLL VENOUS BLD VENIPUNCTURE: CPT | Performed by: NURSE PRACTITIONER

## 2025-07-31 PROCEDURE — C1887 CATHETER, GUIDING: HCPCS

## 2025-07-31 PROCEDURE — 49451 REPLACE DUOD/JEJ TUBE PERC: CPT | Mod: 53 | Performed by: RADIOLOGY

## 2025-07-31 PROCEDURE — 99207 PR APP CREDIT; MD BILLING SHARED VISIT: CPT | Mod: FS | Performed by: STUDENT IN AN ORGANIZED HEALTH CARE EDUCATION/TRAINING PROGRAM

## 2025-07-31 PROCEDURE — C1769 GUIDE WIRE: HCPCS

## 2025-07-31 PROCEDURE — 96374 THER/PROPH/DIAG INJ IV PUSH: CPT

## 2025-07-31 PROCEDURE — 250N000013 HC RX MED GY IP 250 OP 250 PS 637: Performed by: STUDENT IN AN ORGANIZED HEALTH CARE EDUCATION/TRAINING PROGRAM

## 2025-07-31 PROCEDURE — 0D2DXUZ CHANGE FEEDING DEVICE IN LOWER INTESTINAL TRACT, EXTERNAL APPROACH: ICD-10-PCS | Performed by: INTERNAL MEDICINE

## 2025-07-31 PROCEDURE — 74177 CT ABD & PELVIS W/CONTRAST: CPT | Mod: 26 | Performed by: RADIOLOGY

## 2025-07-31 PROCEDURE — 999N000285 HC STATISTIC VASC ACCESS LAB DRAW WITH PIV START

## 2025-07-31 PROCEDURE — 80069 RENAL FUNCTION PANEL: CPT | Performed by: NURSE PRACTITIONER

## 2025-07-31 PROCEDURE — 96361 HYDRATE IV INFUSION ADD-ON: CPT

## 2025-07-31 PROCEDURE — 73560 X-RAY EXAM OF KNEE 1 OR 2: CPT | Mod: LT

## 2025-07-31 PROCEDURE — 73560 X-RAY EXAM OF KNEE 1 OR 2: CPT | Mod: 26 | Performed by: RADIOLOGY

## 2025-07-31 PROCEDURE — 83735 ASSAY OF MAGNESIUM: CPT | Performed by: PHYSICIAN ASSISTANT

## 2025-07-31 PROCEDURE — 250N000012 HC RX MED GY IP 250 OP 636 PS 637: Performed by: NURSE PRACTITIONER

## 2025-07-31 PROCEDURE — 49451 REPLACE DUOD/JEJ TUBE PERC: CPT

## 2025-07-31 PROCEDURE — 82040 ASSAY OF SERUM ALBUMIN: CPT | Performed by: NURSE PRACTITIONER

## 2025-07-31 PROCEDURE — 999N000248 HC STATISTIC IV INSERT WITH US BY RN

## 2025-07-31 PROCEDURE — 258N000003 HC RX IP 258 OP 636: Performed by: STUDENT IN AN ORGANIZED HEALTH CARE EDUCATION/TRAINING PROGRAM

## 2025-07-31 PROCEDURE — 74177 CT ABD & PELVIS W/CONTRAST: CPT

## 2025-07-31 PROCEDURE — 83690 ASSAY OF LIPASE: CPT | Performed by: NURSE PRACTITIONER

## 2025-07-31 PROCEDURE — 250N000013 HC RX MED GY IP 250 OP 250 PS 637: Performed by: PHYSICIAN ASSISTANT

## 2025-07-31 PROCEDURE — 99285 EMERGENCY DEPT VISIT HI MDM: CPT | Mod: 25 | Performed by: EMERGENCY MEDICINE

## 2025-07-31 PROCEDURE — 99285 EMERGENCY DEPT VISIT HI MDM: CPT | Mod: FS | Performed by: EMERGENCY MEDICINE

## 2025-07-31 RX ORDER — DEXTROSE MONOHYDRATE 25 G/50ML
25-50 INJECTION, SOLUTION INTRAVENOUS
Status: DISCONTINUED | OUTPATIENT
Start: 2025-07-31 | End: 2025-07-31

## 2025-07-31 RX ORDER — LIDOCAINE HYDROCHLORIDE 10 MG/ML
1-30 INJECTION, SOLUTION EPIDURAL; INFILTRATION; INTRACAUDAL; PERINEURAL
Status: ACTIVE | OUTPATIENT
Start: 2025-07-31

## 2025-07-31 RX ORDER — FAMOTIDINE 20 MG/1
20 TABLET, FILM COATED ORAL AT BEDTIME
OUTPATIENT
Start: 2025-07-31

## 2025-07-31 RX ORDER — ACETAMINOPHEN 325 MG/1
650 TABLET ORAL EVERY 8 HOURS PRN
Status: DISPENSED | OUTPATIENT
Start: 2025-07-31

## 2025-07-31 RX ORDER — DEXTROSE MONOHYDRATE 25 G/50ML
25-50 INJECTION, SOLUTION INTRAVENOUS
Status: ACTIVE | OUTPATIENT
Start: 2025-07-31

## 2025-07-31 RX ORDER — SIMETHICONE 80 MG
80 TABLET,CHEWABLE ORAL 4 TIMES DAILY
Status: DISPENSED | OUTPATIENT
Start: 2025-07-31

## 2025-07-31 RX ORDER — HYDROCORTISONE 10 MG/1
10 TABLET ORAL EVERY MORNING
OUTPATIENT
Start: 2025-08-01

## 2025-07-31 RX ORDER — DRONABINOL 5 MG/1
5 CAPSULE ORAL 2 TIMES DAILY PRN
Status: DISCONTINUED | OUTPATIENT
Start: 2025-07-31 | End: 2025-07-31

## 2025-07-31 RX ORDER — OXYCODONE HYDROCHLORIDE 5 MG/1
5 TABLET ORAL EVERY 4 HOURS PRN
Refills: 0 | Status: DISCONTINUED | OUTPATIENT
Start: 2025-07-31 | End: 2025-07-31

## 2025-07-31 RX ORDER — SODIUM CHLORIDE 9 MG/ML
INJECTION, SOLUTION INTRAVENOUS CONTINUOUS
Status: ACTIVE | OUTPATIENT
Start: 2025-07-31 | End: 2025-08-01

## 2025-07-31 RX ORDER — TRAZODONE HYDROCHLORIDE 150 MG/1
150 TABLET ORAL AT BEDTIME
Status: DISPENSED | OUTPATIENT
Start: 2025-07-31

## 2025-07-31 RX ORDER — LIDOCAINE HYDROCHLORIDE 20 MG/ML
10 JELLY TOPICAL ONCE
Status: ACTIVE | OUTPATIENT
Start: 2025-07-31

## 2025-07-31 RX ORDER — ONDANSETRON 4 MG/1
4 TABLET, ORALLY DISINTEGRATING ORAL EVERY 6 HOURS PRN
Status: ACTIVE | OUTPATIENT
Start: 2025-07-31

## 2025-07-31 RX ORDER — FUROSEMIDE 20 MG/1
40 TABLET ORAL DAILY
Status: ACTIVE | OUTPATIENT
Start: 2025-07-31

## 2025-07-31 RX ORDER — DRONABINOL 5 MG/1
5 CAPSULE ORAL
Status: ACTIVE | OUTPATIENT
Start: 2025-08-01

## 2025-07-31 RX ORDER — ONDANSETRON 2 MG/ML
4 INJECTION INTRAMUSCULAR; INTRAVENOUS EVERY 6 HOURS PRN
Status: ACTIVE | OUTPATIENT
Start: 2025-07-31

## 2025-07-31 RX ORDER — CYCLOBENZAPRINE HCL 5 MG
10 TABLET ORAL 3 TIMES DAILY PRN
Status: ACTIVE | OUTPATIENT
Start: 2025-07-31

## 2025-07-31 RX ORDER — DULOXETIN HYDROCHLORIDE 30 MG/1
30 CAPSULE, DELAYED RELEASE ORAL DAILY
OUTPATIENT
Start: 2025-07-31

## 2025-07-31 RX ORDER — GABAPENTIN 400 MG/1
400 CAPSULE ORAL 2 TIMES DAILY
Status: DISCONTINUED | OUTPATIENT
Start: 2025-07-31 | End: 2025-07-31

## 2025-07-31 RX ORDER — IODIXANOL 320 MG/ML
50 INJECTION, SOLUTION INTRAVASCULAR ONCE
Status: COMPLETED | OUTPATIENT
Start: 2025-07-31 | End: 2025-07-31

## 2025-07-31 RX ORDER — AMOXICILLIN 250 MG
1 CAPSULE ORAL 2 TIMES DAILY PRN
Status: ACTIVE | OUTPATIENT
Start: 2025-07-31

## 2025-07-31 RX ORDER — METOCLOPRAMIDE 5 MG/1
10 TABLET ORAL 3 TIMES DAILY
Status: DISPENSED | OUTPATIENT
Start: 2025-07-31

## 2025-07-31 RX ORDER — GABAPENTIN 400 MG/1
800 CAPSULE ORAL AT BEDTIME
Status: DISPENSED | OUTPATIENT
Start: 2025-07-31

## 2025-07-31 RX ORDER — MORPHINE SULFATE 4 MG/ML
4 INJECTION, SOLUTION INTRAMUSCULAR; INTRAVENOUS ONCE
Refills: 0 | Status: COMPLETED | OUTPATIENT
Start: 2025-07-31 | End: 2025-07-31

## 2025-07-31 RX ORDER — AMOXICILLIN 250 MG
2 CAPSULE ORAL 2 TIMES DAILY PRN
Status: ACTIVE | OUTPATIENT
Start: 2025-07-31

## 2025-07-31 RX ORDER — AMOXICILLIN 250 MG
2 CAPSULE ORAL AT BEDTIME
Status: DISPENSED | OUTPATIENT
Start: 2025-07-31

## 2025-07-31 RX ORDER — DULOXETIN HYDROCHLORIDE 30 MG/1
60 CAPSULE, DELAYED RELEASE ORAL DAILY
OUTPATIENT
Start: 2025-07-31

## 2025-07-31 RX ORDER — OXYCODONE HYDROCHLORIDE 5 MG/1
5 TABLET ORAL EVERY 6 HOURS PRN
Status: DISPENSED | OUTPATIENT
Start: 2025-07-31

## 2025-07-31 RX ORDER — SUCRALFATE 1 G/1
1 TABLET ORAL 4 TIMES DAILY
Status: DISPENSED | OUTPATIENT
Start: 2025-07-31

## 2025-07-31 RX ORDER — PROCHLORPERAZINE MALEATE 10 MG
10 TABLET ORAL EVERY 6 HOURS PRN
Status: ACTIVE | OUTPATIENT
Start: 2025-07-31

## 2025-07-31 RX ORDER — PANTOPRAZOLE SODIUM 40 MG/1
40 TABLET, DELAYED RELEASE ORAL 2 TIMES DAILY
Status: DISPENSED | OUTPATIENT
Start: 2025-07-31

## 2025-07-31 RX ORDER — IOPAMIDOL 755 MG/ML
62 INJECTION, SOLUTION INTRAVASCULAR ONCE
Status: COMPLETED | OUTPATIENT
Start: 2025-07-31 | End: 2025-07-31

## 2025-07-31 RX ORDER — GABAPENTIN 400 MG/1
400 CAPSULE ORAL 2 TIMES DAILY
Status: ACTIVE | OUTPATIENT
Start: 2025-08-01

## 2025-07-31 RX ORDER — NICOTINE POLACRILEX 4 MG
15-30 LOZENGE BUCCAL
Status: ACTIVE | OUTPATIENT
Start: 2025-07-31

## 2025-07-31 RX ORDER — POLYETHYLENE GLYCOL 3350 17 G/17G
17 POWDER, FOR SOLUTION ORAL 2 TIMES DAILY
Status: DISPENSED | OUTPATIENT
Start: 2025-07-31

## 2025-07-31 RX ORDER — NICOTINE POLACRILEX 4 MG
15-30 LOZENGE BUCCAL
Status: DISCONTINUED | OUTPATIENT
Start: 2025-07-31 | End: 2025-07-31

## 2025-07-31 RX ORDER — TOPIRAMATE 50 MG/1
100 TABLET, FILM COATED ORAL 2 TIMES DAILY
Status: ACTIVE | OUTPATIENT
Start: 2025-07-31

## 2025-07-31 RX ORDER — ONDANSETRON 2 MG/ML
4 INJECTION INTRAMUSCULAR; INTRAVENOUS EVERY 30 MIN PRN
Status: DISCONTINUED | OUTPATIENT
Start: 2025-07-31 | End: 2025-07-31

## 2025-07-31 RX ORDER — OXYCODONE HYDROCHLORIDE 10 MG/1
10 TABLET ORAL EVERY 4 HOURS PRN
Refills: 0 | Status: DISCONTINUED | OUTPATIENT
Start: 2025-07-31 | End: 2025-07-31

## 2025-07-31 RX ADMIN — INSULIN ASPART 4 UNITS: 100 INJECTION, SOLUTION INTRAVENOUS; SUBCUTANEOUS at 17:23

## 2025-07-31 RX ADMIN — GABAPENTIN 800 MG: 400 CAPSULE ORAL at 22:49

## 2025-07-31 RX ADMIN — IODIXANOL 5 ML: 320 INJECTION, SOLUTION INTRAVASCULAR at 16:31

## 2025-07-31 RX ADMIN — MORPHINE SULFATE 4 MG: 4 INJECTION INTRAVENOUS at 17:22

## 2025-07-31 RX ADMIN — PANTOPRAZOLE SODIUM 40 MG: 40 TABLET, DELAYED RELEASE ORAL at 20:44

## 2025-07-31 RX ADMIN — SODIUM CHLORIDE 1000 ML: 0.9 INJECTION, SOLUTION INTRAVENOUS at 17:22

## 2025-07-31 RX ADMIN — METOCLOPRAMIDE 10 MG: 5 TABLET ORAL at 20:44

## 2025-07-31 RX ADMIN — OXYCODONE HYDROCHLORIDE 5 MG: 5 TABLET ORAL at 20:44

## 2025-07-31 RX ADMIN — IOPAMIDOL 62 ML: 755 INJECTION, SOLUTION INTRAVENOUS at 17:48

## 2025-07-31 RX ADMIN — POLYETHYLENE GLYCOL 3350 17 G: 17 POWDER, FOR SOLUTION ORAL at 22:50

## 2025-07-31 RX ADMIN — MORPHINE SULFATE 4 MG: 4 INJECTION INTRAVENOUS at 18:45

## 2025-07-31 RX ADMIN — SODIUM CHLORIDE: 0.9 INJECTION, SOLUTION INTRAVENOUS at 22:48

## 2025-07-31 RX ADMIN — SENNOSIDES, DOCUSATE SODIUM 2 TABLET: 50; 8.6 TABLET, FILM COATED ORAL at 22:50

## 2025-07-31 RX ADMIN — ACETAMINOPHEN 650 MG: 325 TABLET ORAL at 20:44

## 2025-07-31 RX ADMIN — TRAZODONE HYDROCHLORIDE 150 MG: 150 TABLET ORAL at 22:50

## 2025-07-31 ASSESSMENT — ACTIVITIES OF DAILY LIVING (ADL)
ADLS_ACUITY_SCORE: 57

## 2025-07-31 ASSESSMENT — COLUMBIA-SUICIDE SEVERITY RATING SCALE - C-SSRS
2. HAVE YOU ACTUALLY HAD ANY THOUGHTS OF KILLING YOURSELF IN THE PAST MONTH?: NO
6. HAVE YOU EVER DONE ANYTHING, STARTED TO DO ANYTHING, OR PREPARED TO DO ANYTHING TO END YOUR LIFE?: NO
1. IN THE PAST MONTH, HAVE YOU WISHED YOU WERE DEAD OR WISHED YOU COULD GO TO SLEEP AND NOT WAKE UP?: NO

## 2025-07-31 ASSESSMENT — ENCOUNTER SYMPTOMS
ABDOMINAL PAIN: 1
COUGH: 0
SHORTNESS OF BREATH: 0
NAUSEA: 1
FEVER: 0

## 2025-07-31 NOTE — ED TRIAGE NOTES
Arrives ambulatory with the need to get her Gtube replaced. States it fell out Sunday and couldn't find a ride to hospital until today.      Triage Assessment (Adult)       Row Name 07/31/25 1300          Triage Assessment    Airway WDL WDL        Respiratory WDL    Respiratory WDL WDL        Skin Circulation/Temperature WDL    Skin Circulation/Temperature WDL WDL        Cardiac WDL    Cardiac WDL WDL        Peripheral/Neurovascular WDL    Peripheral Neurovascular WDL WDL        Cognitive/Neuro/Behavioral WDL    Cognitive/Neuro/Behavioral WDL WDL

## 2025-07-31 NOTE — TELEPHONE ENCOUNTER
[facility] Call Center    Phone Message    May a detailed message be left on voicemail: yes     Reason for Call: Other: Pt wanted me to let you know she is in the ER at the [facility], not sure if they are going to admit her or not, but asked that I let you know.       Action Taken: Message routed to:  [facility] PCC    Travel Screening: Not Applicable     Date of Service:

## 2025-07-31 NOTE — H&P
[facility]    History and Physical - Hospitalist Service, GOLD TEAM        Date of Admission:  7/31/2025    Assessment & Plan      Chantell Kidd is a 59 year old female admitted on 7/31/2025. She has PMH of insulin-dependent DM following TPAIT (1/2012), migraine, hypothyroidism, adrenal insufficiency, and RNY with GJ tube for nutrition who presents to the ED for evaluation of Gtube malfunction. S/p IR consultation for replacement, though with closed track, prompting admission to Medicine Observation for GI consultation for new GJ-tube.     ## GJ tube malfunction  ## Malnutrition s/p RNY and GJ tube:  Patient presents with Jtube that has been dislodged. Attempt at IR replacement- unsuccessful. J tube fell out ~ 4 days ago and patient has not had TF. Does tolerate PO intake and had been eating and drinking small amounts. PTA- continuous TF.   - GI consulted. Appreciate assistance and recs  - NPO at [facility]  - Regular diet for now  - Daily CBC, BMP, INR in am     ## Acute on Chronic abdominal pain: PTA Creon, famotidine, protonix, Linzess, reglan and sucralfate. Imaging w/ large stool burden. Benign abdominal exam. Suspect related to constipation.  - Continue PTA medications: Famotidine, protonix, Linzess, reglan and sucralfate  - HOLD creon with   - Miralax and senna; suppository PRN   - PRN Oxycodone    ## left knee pain: Acute onset in past 2-3 days. NO reports of trauma. No swelling.    - knee xray  - PT consult     ## Post-pancreatectomy DM Type 1, poorly controlled  ## Chronic Pancreatitis and Sphincter of Oddi dysfunction s/p TPAIT (1/2012)  Patient has poorly controlled diabetes with multiple presentations with glucose >700. Today, presented with BG of 732. Most recent HbA1c of 15.8 (6/2025). No anion gap, Bicarb 28- no evidence of DKA. In the ED, patient received 1 L IVF; 4 units novolog and glucose improved to 496. Has previously been seen by inpatient  endocrinology team with concern for patient not using insulin at home,though patient reports she has been using insulin regimen, though holding NPH with No TF.   - Endocrinology consulted, appreciate recommendations   - Continue lantus (PTA dose 12 units), though decreased dose of 8 units as with NPO   - HOLD PTA NPH 10 units BID- given with TF   - MSSI   - PTA Creon 96,000U TID with meals   - BG TID w/ meals and qhs  - Hypoglycemia protocol     ## Chronic BLE Edema:   - HOLD PTA Lasix            ## Adrenal insufficiency: PTA hydrocortisone.   - Continue PTA hydrocortisone 10mg in the AM and 15mg in the PM      ## Hypothyroidism: PTA Synthroid  - Continue PTA Synthroid     ## MDD  ## Anxiety  ## Insomnia: PTA duloxetine, trazodone  - Continue PTA duloxetine, trazodone     Observation Goals: -diagnostic tests and consults completed and resulted, -vital signs normal or at patient baseline, -tolerating oral intake to maintain hydration, -returns to baseline functional status, -safe disposition plan has been identified, - GI consultation, - GTube return of function, - BG control , Nurse to notify provider when observation goals have been met and patient is ready for discharge.  Diet: NPO for Medical/Clinical Reasons Except for: Meds  NPO for Procedure/Surgery per Anesthesia Guidelines Except for: No Exceptions  NPO for Medical/Clinical Reasons Except for: Meds    DVT Prophylaxis: Pneumatic Compression Devices  Mckee Catheter: Not present  Lines: None     Cardiac Monitoring: None  Code Status: Full Code      Clinically Significant Risk Factors Present on Admission         # Hyponatremia: Lowest Na = 132 mmol/L in last 2 days, will monitor as appropriate  # Hypochloremia: Lowest Cl = 92 mmol/L in last 2 days, will monitor as appropriate                        # Financial/Environmental Concerns:           Disposition Plan     Medically Ready for Discharge: Anticipated in 2-4 Days         The patient's care was discussed  with the Attending Physician, Dr. Proctor .    Lilliam Venegas PA-C  Hospitalist Service, [facility]  Securely message with [employer]bet[employer] (more info)  Text page via [facility] Paging/Directory   See signed in provider for up to date coverage information    ______________________________________________________________________    Chief Complaint   GJ tube malfunction     History is obtained from the patient and EMR     History of Present Illness   Chantell Kidd is a 59 year old female admitted on 7/31/2025. She has PMH of insulin-dependent DM following TPAIT (1/2012), migraine, hypothyroidism, adrenal insufficiency, and RNY with GJ tube for nutrition who presents to the ED for evaluation of Gtube malfunction. S/p IR consultation for replacement, though with closed track, prompting admission to Medicine Observation for GI consultation for new GJ-tube.    Patient reports she was washing dishes ~ 4 days age when her J tube fell out when she was walking to her couch. She did not seek medical care as she did not have a ride.     Currently, patient with complaints of left knee pain, Abdominal bloating.    Patient does not endorse current : headaches, changes in vision, chest pain, palpitations, upper respiratory symptoms of rhinorrhea or congestion, shortness of breath, cough, sputum production, wheezing, abdominal pain, nausea, emesis, diarrhea, dysuria, edema, rashes, weakness, focal neurologic deficits, recent travel, illness, fever, chills.        Past Medical History    Past Medical History:   Diagnosis Date    Bone and joint disord back, pelvis, leg complicat preg, childb, puerp 2010    Chronic abdominal pain     Chronic headache     Chronic pancreatitis (H)     S/P pancreatectomy    Depression with anxiety     Earache or other ear, nose, or throat complaint 2006    Fracture 2003    Gallbladder problem 2004    Gastro-oesophageal reflux disease     Gastroparesis  05/13/2025    Hearing problem 2008    Hypothyroidism 04/23/2015    Kidney stones     Low serum cortisol level     Migraines     Other bladder disorder 2000    Other chronic pain     STOMACH    Other chronic pain     LUMBAR SPINE    Peripheral neuropathy     Pneumonia 1980    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    TPIAT    Spasm of sphincter of Oddi     Stomach problems 2000    Transplant Islet Transplant    Uncomplicated asthma 1980    Urinary tract infection 2000    Vision disorder 2000       Past Surgical History   Past Surgical History:   Procedure Laterality Date    ARTHROPLASTY CARPOMETACARPAL (THUMB JOINT)  05/02/2014    Procedure: ARTHROPLASTY CARPOMETACARPAL (THUMB JOINT);  Surgeon: Carina Panda MD;  Location: [facility] OR    BACK SURGERY  1/6/2012    removed during Transplant    BIOPSY      BREAST SURGERY  05/2015    CHOLECYSTECTOMY  01/01/2004    COLONOSCOPY  07/18/2014    Procedure: COLONOSCOPY;  Surgeon: Aurora Sahu MD;  [facility]: [facility]    COLONOSCOPY N/A 08/01/2017    Procedure: COLONOSCOPY;  Colonoscopy and upper endoscopy;  Surgeon: Deirdre Harris MD;  [facility]: [facility]    ENDOSCOPIC INSERTION TUBE JEJUNOSTOMY N/A 05/02/2025    Procedure: Esophagogastroduodenoscopy, Jejunopexy, JEJUNOSTOMY TUBE PLACEMENT, GASTROSTOMY TUBE EXCHANGE;  Surgeon: Jose Francisco Perdomo MD;  [facility]    ENDOSCOPIC RETROGRADE CHOLANGIOPANCREATOGRAM      ENDOSCOPIC RETROGRADE CHOLANGIOPANCREATOGRAM  04/19/2011    Procedure:ENDOSCOPIC RETROGRADE CHOLANGIOPANCREATOGRAM; Pancreatic Stent Placement      ENDOSCOPIC RETROGRADE CHOLANGIOPANCREATOGRAM  05/26/2011    Procedure:ENDOSCOPIC RETROGRADE CHOLANGIOPANCREATOGRAM; with Pancreatic Stent Removal; Surgeon:DALE MIMS; Location[facility]    ENDOSCOPY UPPER, COLONOSCOPY, COMBINED  04/25/2012    Procedure:COMBINED ENDOSCOPY UPPER, COLONOSCOPY; Enteroscopy with Bile Duct Stent Removal, Colonoscopy  *Latex Safe Room*; Surgeon:CITLALI  GRACY MILLERIQ; [facility]    ESOPHAGOSCOPY, GASTROSCOPY, DUODENOSCOPY (EGD), COMBINED  05/26/2011    Procedure:COMBINED ESOPHAGOSCOPY, GASTROSCOPY, DUODENOSCOPY (EGD); Surgeon:DALE MIMS; [facility]    ESOPHAGOSCOPY, GASTROSCOPY, DUODENOSCOPY (EGD), COMBINED N/A 10/30/2014    Procedure: COMBINED ESOPHAGOSCOPY, GASTROSCOPY, DUODENOSCOPY (EGD), BIOPSY SINGLE OR MULTIPLE;  Surgeon: Sarai Moon MD;  [facility]    ESOPHAGOSCOPY, GASTROSCOPY, DUODENOSCOPY (EGD), COMBINED Left 07/06/2015    Procedure: COMBINED ESOPHAGOSCOPY, GASTROSCOPY, DUODENOSCOPY (EGD), BIOPSY SINGLE OR MULTIPLE;  Surgeon: Thomas Estrada MD;  Location:  GI    ESOPHAGOSCOPY, GASTROSCOPY, DUODENOSCOPY (EGD), COMBINED N/A 07/08/2016    Procedure: COMBINED ESOPHAGOSCOPY, GASTROSCOPY, DUODENOSCOPY (EGD), BIOPSY SINGLE OR MULTIPLE;  Surgeon: Eloy Klein MD;  Location:  GI    ESOPHAGOSCOPY, GASTROSCOPY, DUODENOSCOPY (EGD), COMBINED N/A 08/04/2016    Procedure: COMBINED ESOPHAGOSCOPY, GASTROSCOPY, DUODENOSCOPY (EGD), BIOPSY SINGLE OR MULTIPLE;  Surgeon: Jason Brown MD;  Location:  GI    ESOPHAGOSCOPY, GASTROSCOPY, DUODENOSCOPY (EGD), COMBINED N/A 08/01/2017    Procedure: COMBINED ESOPHAGOSCOPY, GASTROSCOPY, DUODENOSCOPY (EGD);;  Surgeon: Deirdre Harris MD;  Location:  GI    ESOPHAGOSCOPY, GASTROSCOPY, DUODENOSCOPY (EGD), COMBINED N/A 06/12/2019    Procedure: ESOPHAGOGASTRODUODENOSCOPY (EGD);  Surgeon: Jose Francisco Perdomo MD;  Location:  GI    GYN SURGERY      Hysterectomy and USO    [facility] ENDOSCOPY W EUS  07/20/2011    Procedure:COMBINED ENDOSCOPIC ULTRASOUND, ESOPHAGOSCOPY, GASTROSCOPY, DUODENOSCOPY (EGD); Surgeon:DARVIN DONOHUE; [facility]    HERNIORRHAPHY VENTRAL N/A 09/15/2016    Procedure: HERNIORRHAPHY VENTRAL;  Surgeon: Juanita Bernabe MD;  [facility]    HYSTERECTOMY  1997 or 1998    USO    INCISION AND DRAINAGE ABDOMEN WASHOUT,  COMBINED  2012    Procedure: COMBINED INCISION AND DRAINAGE ABDOMEN WASHOUT;  ,debridement and Drainage Post Appendectomy;  Surgeon: Ron Austin MD;  [facility]    INJECT TRANSVERSUS ABDOMINIS PLANE (TAP) BLOCK BILATERAL Bilateral 2016    Procedure: INJECT TRANSVERSUS ABDOMINIS PLANE (TAP) BLOCK BILATERAL;  Surgeon: Leonard Mccallum MD;  [facility]    IR NG TUBE PLACEMENT REQ RAD & FLUORO  2017    IR NG TUBE PLACEMENT REQ RAD & FLUORO  2025    IR NG TUBE PLACEMENT REQ RAD & FLUORO  2025    LAPAROSCOPIC APPENDECTOMY  2012    Procedure: LAPAROSCOPIC APPENDECTOMY;  Open Appendectomy;  Surgeon: Ron Austin MD;  [facility]    PANCREATECTOMY, TRANSPLANT AUTO ISLET CELL, COMBINED  2012    Procedure:COMBINED PANCREATECTOMY, TRANSPLANT AUTO ISLET CELL; Total  Pancreatectomy, Auto Islet Transplant, splenectomy, 18fr. transgastric-jejunal feeding tube placement, liver biopsy; Surgeon:PALAK LEE; [facility]    PICC DOUBLE LUMEN PLACEMENT Right 2025    5FR DL PICC, brachial medial vein. L-38cm, 3cm out.    PICC INSERTION - DOUBLE LUMEN Right 2025    39-3cm, Medial brachial vein    NE STOMACH SURGERY PROCEDURE UNLISTED      REPLACE GASTROSTOMY TUBE, PERCUTANEOUS N/A 2017    Procedure: REPLACE GASTROSTOMY TUBE, PERCUTANEOUS;  GJ Tube Change;  Surgeon: Jose Nath PA-C;  [facility]    SOFT TISSUE SURGERY  412    SPLENECTOMY      TRANSPLANT  2012    auto Islet transplant       Prior to Admission Medications   Prior to Admission Medications   Prescriptions Last Dose Informant Patient Reported? Taking?   Acetone, Urine, Test (KETONE TEST) STRP   No No   Si each by In Vitro route as needed (hyperglycemia)   Alcohol Swabs PADS   No No   Sig: Use to swab the area of the injection or tiago as directed Per insurance coverage   CONTOUR NEXT TEST test strip   No No   Sig: USE TO TEST BLOOD SUGAR 6 TIMES  DAILY OR AS DIRECTED. Call clinic to schedule follow up appointment.  IMPORTANT   Continuous Glucose  (DEXCOM G7 ) RICARDO   No No   Sig: Use to read blood sugars as per 's instructions.   Continuous Glucose Sensor (DEXCOM G7 SENSOR) MISC   No No   Sig: Change every 10 days.   DULoxetine (CYMBALTA) 30 MG capsule   No No   Sig: Take 1 capsule (30 mg) by mouth daily With 60mg capsule for total dose of 90mg   DULoxetine (CYMBALTA) 60 MG EC capsule   No No   Sig: Take 1 capsule (60 mg) by mouth daily With 30mg capsule for total dose of 90mg   Digestive Enzyme Cartridge (RELIZORB) Device   No No   Sig: Place 1 each (1 Device) into OG Tube 2 times daily. Administer as 1 cartridge every 12 hours   Glucagon (GVOKE HYPOPEN 2-PACK) 1 MG/0.2ML pen   No No   Sig: Inject the contents of 1 device under the skin into lower abdomen, outer thigh, or outer upper arm as needed for hypoglycemia. If no response after 15 minutes, additional 1 mg dose from a new device may be injected while waiting for emergency assistance.   Injection Device for insulin (NOVOPEN ECHO) RICARDO   No No   Sig: Use with   Insulin   Insulin Disposable Pump (OMNIPOD 5 G6 INTRO, GEN 5,) KIT   No No   Si each See Admin Instructions Use continuously to infuse insulin.   Sagrario Super Ele&Tec 1.5 Peptide Plain 325 mL   No No   Sig: Place 1,080 mLs into J tube daily. Infuse [place] Peptide 1.5 @ 45 ml/hr into J-tube via pump  Water flush: 120 ml tid + an additional 550 ml through flushes or oral intake  Kcals: 1662  Cartons per day: 3.5   Lidocaine (LIDOCARE) 4 % Patch   No No   Sig: Place 1-2 patches over 12 hours onto the skin every 24 hours. To prevent lidocaine toxicity, patient should be patch free for 12 hrs daily.   Nutritional Supplements (BOOST HIGH PROTEIN) LIQD   No No   Sig: After above baseline labs are drawn, give: 6 mL/kg to maximum of 360 mL; the beverage is to be consumed within 5 minutes.   SUMAtriptan (IMITREX) 50 MG tablet   Self No No   Sig: Take 1 tablet (50 mg) by mouth at onset of headache for migraine Take 1 Tab by mouth Once as needed for Migraine Headache. May repeat after two hours.  Maximum dose 200 mg/24 hours.   Sharps Container MISC   No No   Sig: Use as directed to dispose of needles, lancets and other sharps   acetaminophen (TYLENOL) 325 MG tablet   Yes No   Sig: Take 3 tablets (975 mg) by mouth every 8 hours   alendronate (FOSAMAX) 70 MG tablet   No No   Sig: Take 1 tablet (70 mg) by mouth every 7 days On Sundays take first thing in the morning with plain water and remain upright for at least 30 minutes and until after first food of the day    Do not restart Fosamax (alendronate) until your difficulty swallowing has resolved and you have finished the entire course of fluconazole (Diflucan).   alum & mag hydroxide-simethicone (MYLANTA/MAALOX) 200-200-25 MG CHEW chewable tablet   Yes No   Sig: Take 1 tablet by mouth 3 times daily as needed for indigestion   cyclobenzaprine (FLEXERIL) 10 MG tablet   No No   Sig: Take 1 tablet (10 mg) by mouth or Feeding Tube 3 times daily as needed for muscle spasms.   diclofenac (VOLTAREN) 1 % topical gel   No No   Sig: Apply 2 g topically 4 times daily as needed for moderate pain.   droNABinol (MARINOL) 2.5 MG capsule   Yes No   Sig: Take 2 capsules (5 mg) by mouth 2 times daily as needed (nausea abdominal discomfort).   droNABinol (MARINOL) 5 MG capsule   No No   Sig: Take 1 capsule (5 mg) by mouth 2 times daily (before meals).   famotidine (PEPCID) 20 MG tablet   No No   Sig: Take 1 tablet (20 mg) by mouth At Bedtime   furosemide (LASIX) 40 MG tablet   No No   Sig: Take 1 tablet (40 mg) by mouth daily.   gabapentin (NEURONTIN) 400 MG capsule   No No   Sig: Take 1 tablet (400mg) twice a day and then an additional 2 tablets (800mg) at bedtime.   glycerin (ADULT) 2 g suppository   No No   Sig: Place 1 suppository rectally daily as needed for constipation.   hydrocortisone (CORTEF) 10 MG  tablet   Yes No   Sig: Take 10 mg in the morning and 10 mg along with a 5 mg tablet at bedtime for a total dose of 15 mg at bedtime. Watch for hypoglycemia recurrence.   hydrocortisone (CORTEF) 5 MG tablet   Yes No   Sig: Take 5 mg by mouth At Bedtime along with a 10 mg tablet for a total dose of 15 mg   hydrocortisone sodium succinate PF (SOLU-CORTEF) 100 MG injection   No No   Sig: Inject 100mg (1 mL) into muscle in emergency or unable to take oral hydrocortisone. Go to emergency room if given. ActoVial NDC [address]-33. Note to pharmacy: Provide two 3ml syringes with 23g 1 inch needles.   insulin  UNIT/ML injection   No No   Sig: Inject 10 Units subcutaneously 2 times daily.   insulin aspart (NOVOLOG PEN) 100 UNIT/ML pen   No No   Sig: Continue Novolog Meal Coverage: 1 unit per 12 grams CHO, TID AC and 1:15 PRN with snacks/supplements - Continue Novolog Correction Scale: 1:75>150 TID AC, HS, 0200 at 1400   insulin aspart (NOVOLOG PEN) 100 UNIT/ML pen   No No   Sig: -novolog insulin: 1 unit for every 12 grams of carbs with meals and snacks plus correction insulin per scale below: Meal time sliding scale insulin:If -199 give 1 unit If -249 give 2 units If -299 give 3 units If -349 give 4 units If BG greater than 350 give 5 units, Bedtime sliding scale insulin: If -249 give 1 unit If -299 give 2 units If -349 give 3 units If BG greater than 350 give 4 units   insulin glargine (LANTUS PEN) 100 UNIT/ML pen   No No   Sig: Inject 10 Units subcutaneously every 24 hours.   insulin pen needle (PIP PEN NEEDLES 32G X 4MM) 32G X 4 MM miscellaneous   No No   Sig: Use 6 pen needles daily or as directed.   levothyroxine (SYNTHROID/LEVOTHROID) 125 MCG tablet   No No   Sig: Take 1 tablet (125 mcg) by mouth every morning (before breakfast).   linaclotide (LINZESS) 145 MCG capsule   No No   Sig: Take 1 capsule (145 mcg) by mouth every morning (before breakfast). as needed    lipase-protease-amylase (CREON 12) 89829-84[address]-31285 units CPEP   No No   Sig: Take 8 capsules by mouth 3 times daily (with meals). 6 capsules with snacks   metoclopramide (REGLAN) 5 MG tablet   No No   Sig: Take 2 tablets (10 mg) by mouth 3 times daily.   naloxone (NARCAN) 4 MG/0.1ML nasal spray   No No   Sig: Spray 1 spray (4 mg) into one nostril alternating nostrils as needed for opioid reversal. every 2-3 minutes until assistance arrives   ondansetron (ZOFRAN) 4 MG tablet   No No   Sig: Take 1 tablet (4 mg) by mouth every 8 hours as needed for nausea.   oxyCODONE (ROXICODONE) 5 MG tablet   No No   Sig: Take 1-2 tablets (5-10 mg) by mouth every 6 hours as needed for pain. Take the lowest possible dose to control your pain, and use non-opioid pain options first. Decrease the number of pills you take each day after you discharge until you are able to stop completely.   pantoprazole (PROTONIX) 40 MG EC tablet   No No   Sig: Take 1 tablet (40 mg) by mouth 2 times daily.   polyethylene glycol (MIRALAX) 17 GM/Dose powder   No No   Sig: Take 17 g by mouth 3 times daily.   potassium chloride ER (KLOR-CON M) 20 MEQ CR tablet   No No   Sig: Take 1 tablet (20 mEq) by mouth daily   senna-docusate (SENOKOT-S/PERICOLACE) 8.6-50 MG tablet   No No   Sig: Take 2 tablets by mouth 2 times daily.   simethicone (MYLICON) 80 MG chewable tablet   No No   Sig: Take 1 tablet (80 mg) by mouth 4 times daily.   sodium chloride (OCEAN) 0.65 % nasal spray   No No   Sig: Spray 1 spray into both nostrils daily as needed for congestion   sodium chloride, PF, 0.9% PF flush   No No   Sig: Inject 10-40 mLs into catheter every 8 hours. -- Flush J port and G port EACH with 30 ml water every 8 hours -- Flush J port with 30 ml water BEFORE AND AFTER medications to avoid clogging of this tube.   sucralfate (CARAFATE) 1 GM tablet   No No   Sig: Take 1 tablet (1 g) by mouth 4 times daily.   thiamine (B-1) 100 MG tablet   No No   Sig: Place 1 tablet (100  mg) into Feeding Tube daily.   topiramate (TOPAMAX) 100 MG tablet   No No   Sig: Take 1 tablet (100 mg) by mouth 2 times daily.   traZODone (DESYREL) 150 MG tablet   No No   Sig: Take 1 tablet (150 mg) by mouth at bedtime.      Facility-Administered Medications: None        Review of Systems    The 10 point Review of Systems is negative other than noted in the HPI or here.      Physical Exam   Vital Signs: Temp: 98.9  F (37.2  C) Temp src: Oral BP: (!) 140/88 Pulse: 79   Resp: 18 SpO2: 98 % O2 Device: None (Room air)    Weight: 0 lbs 0 oz      Physical Exam   Constitutional:   Well nourished, well developed, resting comfortably   HEENT:   Head: Normocephalic and atraumatic.   Eyes: Conjunctivae are normal. Pupils are equal, round, and reactive to light.  Pharynx has no erythema or exudate, mucous membranes are moist  Neck:   No adenopathy, no bony tenderness  Cardiovascular: Regular rate and rhythm without murmurs or gallops  Pulmonary/Chest: Clear to auscultation bilaterally, with no wheezes or retractions. No respiratory distress.  GI: Soft with good bowel sounds.  Non-tender, non-distended, with no guarding, no rebound, no peritoneal signs.   Back:  No bony or CVA tenderness   Musculoskeletal:  No edema or clubbing   Skin: Skin is warm and dry. No rash noted.   Neurological: Alert and oriented to person, place, and time. Nonfocal exam  Psychiatric:  Normal mood and affect.      Medical Decision Making       75 MINUTES SPENT BY ME on the date of service doing chart review, history, exam, documentation & further activities per the note.      Data     I have personally reviewed the following data over the past 24 hrs:    5.1  \   12.6   / 299     132 (L) 92 (L) 4.3 (L) /  432 (H)   4.5 28 0.39 (L) \     ALT: N/A AST: N/A AP: 170 (H) TBILI: 0.2   ALB: 3.9 TOT PROTEIN: 6.8 LIPASE: 7 (L)     Procal: N/A CRP: N/A Lactic Acid: 1.7       INR:  0.97 PTT:  N/A   D-dimer:  N/A Fibrinogen:  N/A       Imaging results reviewed  over the past 24 hrs:   Recent Results (from the past 24 hours)   IR Jejunostomy Tube Change    Narrative    PROCEDURE: Jejunostomy tube change.  Procedural Personnel  Attending physician(s): Drew Puri MD  Fellow physician(s): Dayton Nunes MD  Resident physician(s): None    Procedure Date (mm/dd/yyyy): 7/31/2025    Pre-procedure diagnosis: Left lower quadrant jejunostomy fell out.  Post-procedure diagnosis: Same  Procedure urgency: Urgent  Indication: Jejunostomy fell 4 days ago.  Additional clinical history:  16 fr J-tube replacement (fell out 4  days ago, nothing has been within the tract).     Complications: No immediate complications.      Impression    IMPRESSION:  Unable to re-access left upper quadrant jejunostomy stoma.    Plan:   - Recommend surgery consult for surgical options for regaining  jejunostomy access.  - There is a patent channel between the left lower quadrant ostomy and  the perineum, raising concern for source of peritonitis.  _______________________________________________________________    PROCEDURE SUMMARY:  -Left lower quadrant jejunostomy stoma was accessed with a TONEY 1  catheter and contrast demonstrating access into the peritoneum.    PROCEDURE DETAILS:    Pre-procedure  Consent: Informed consent for the procedure including risks, benefits  and alternatives was obtained and time-out was performed prior to the  procedure.  Preparation: The site was prepared and draped using maximal sterile  barrier technique including cutaneous antisepsis.    Anesthesia/sedation  Level of anesthesia/sedation: No sedation  Anesthesia/sedation administered by: Independent trained observer  under attending supervision with continuous monitoring of the  patient?s level of consciousness and physiologic status  Total intra-service sedation time (minutes): 0    Procedure summary  The left left lower quadrant stoma was accessed with a TONEY 1 and  Glidewire. No resistance was encountered while advancing the  system as  a unit. The guidewire was retracted, water-soluble contrast was  injected, which demonstrated outlining of the intestines suggestive of  intraperitoneal access.    The catheter was retracted and a few attempts were made in order to  regain access into the bowel. Attempts are unsuccessful. The procedure  was terminated.    Radiation Dose  Fluoroscopy time (minutes):4.2    Additional Details  Additional description of procedure: None  Acute procedural success: No  Registry event: V/3/g  Device used: None  Equipment details: None  Unique Device Identifiers: Not available  Specimens removed: None  Estimated blood loss (mL): Less than 10  Standardized report: [email or web address]    Attestation  Signer name: AURORA BRUNO MD  I attest that I was present for the entire procedure. I reviewed the  stored images and agree with the report as written.     CT Abdomen Pelvis w Contrast    Impression    RESIDENT PRELIMINARY INTERPRETATION  IMPRESSION:  1. Jejunostomy tube is not visualized within the jejunostomy tract  over the left hemiabdomen. The tract appears intact extending to a  loop of small bowel along the left hemiabdomen wall. No fluid  collections or significant inflammatory stranding along the tract.  Gastrostomy tube remains in place.  2. Large stool burden seen throughout the colon, and inspissated stool  within the distal small bowel. Suggestive of slow motility, and likely  constipation.  3. Stable surgical changes of the abdomen as detailed above.

## 2025-07-31 NOTE — CONSULTS
"Consult received for Vascular Access Team.  See LDA for details. For additional needs place \"Consult for Inpatient Vascular Access Care\"  [id number] order in EPIC.  " No Vaccines Administered.

## 2025-07-31 NOTE — PROGRESS NOTES
Critical labs reported to [facility] ED RN.  Glucose was 732 and lipase will diandra to be redrawn per lab. Patient is in transport back to ED.

## 2025-07-31 NOTE — ED NOTES
Bed: ED05  Expected date:   Expected time:   Means of arrival:   Comments:  Chantell Kidd here after IR

## 2025-07-31 NOTE — PROCEDURES
[facility]    Procedure: IR Procedure Note    Date/Time: 7/31/2025 4:23 PM    Performed by: Dayton Nunes MD  Authorized by: Dayton Nunes MD  IR Fellow Physician: Des    Pre Procedure Diagnosis: Jejunostomy tube feel out  Post Procedure Diagnosis: Jejunostomy tract has closed, open peritoneum to the LLQ stoma.    UNIVERSAL PROTOCOL   Site Marked: Yes  Prior Images Obtained and Reviewed:  Yes  Required items: Required blood products, implants, devices and special equipment available    Patient identity confirmed:  Verbally with patient  NA - No sedation, light sedation, or local anesthesia  Confirmation Checklist:  Patient's identity using two indicators  Time out: Immediately prior to the procedure a time out was called    Universal Protocol: the Joint Commission Universal Protocol was followed    Preparation: Patient was prepped and draped in usual sterile fashion      SEDATION    Patient Sedated: No    Findings: - No access to the jejunum from LLQ stoma.  - Open connection of the peritoneum to the LLQ stoma.     Specimens: none    Procedural Complications: None    Condition: Stable    Plan: - Consult surgery for surgical options regarding replacement of jejunostomy.  - Raised suspicion for a source of developing peritonitis given open connection between LLQ stoma and peritoneum.       PROCEDURE  Describe Procedure: - The jejunostomy stoma was accessed with a glide wire and JB1 catheter. No resistance was identified upon entry. Contrast injection into the JB1 catheter demonstrated outlining of the bowel within the peritoneum.   Patient Tolerance:  Patient tolerated the procedure well with no immediate complications  Length of time physician/provider present for 1:1 monitoring during sedation:  0 min

## 2025-07-31 NOTE — PROGRESS NOTES
Called about this 59-year-old female who is s/p TPIAT who has a PEG-J placed by GI 5/2025.  Came in because her tube dislodged 4 days ago.  IR attempted to place new tube but her tract was closed.  Unfortunately, she will need a new tube given that the tract is now closed.  Please make her n.p.o. at midnight and GI will reassess 8/1 to determine timing of tube placement.  - N.p.o. at midnight  - Please check CBC, BMP, coags in a.m.  - Hold all AC      Uday Daniels  Gastroenterology Fellow, PGY5

## 2025-07-31 NOTE — ED PROVIDER NOTES
ED Provider Note  [facility]      History     Chief Complaint   Patient presents with    Gtube Problem     The history is provided by the patient.     Chantell Kidd is a 59 year old female with history of chronic pancreatitis s/p total pancreatectomy with islet autotransplantation (2012), post TPAIT type I diabetes, s/p PEG-J tube replacement (5/2/2025) and adrenal insufficiency, who presents to the emergency department with J-tube dislodgment.  Patient reports 4 days ago she was in the kitchen when she noticed the J-tube fell out.  She was unable to get a ride to the ED during that time.  She reports the last time she had it fell out was July 3 and she requires IR intervention to have it placed back.  She did endorse new abdominal pain to the left lower quadrant diffuse throughout afterward.  Her last bowel movement was this morning.  She denies any fevers or chills he denies any back pain.  Does Dors nausea which she stated is chronic.  Denies any other complaint.    Past Medical History  Past Medical History:   Diagnosis Date    Bone and joint disord back, pelvis, leg complicat preg, childb, puerp 2010    Chronic abdominal pain     Chronic headache     Chronic pancreatitis (H)     S/P pancreatectomy    Depression with anxiety     Earache or other ear, nose, or throat complaint 2006    Fracture 2003    Gallbladder problem 2004    Gastro-oesophageal reflux disease     Gastroparesis 05/13/2025    Hearing problem 2008    Hypothyroidism 04/23/2015    Kidney stones     Low serum cortisol level     Migraines     Other bladder disorder 2000    Other chronic pain     STOMACH    Other chronic pain     LUMBAR SPINE    Peripheral neuropathy     Pneumonia 1980    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    TPIAT    Spasm of sphincter of Oddi     Stomach problems 2000    Transplant Islet Transplant    Uncomplicated asthma 1980    Urinary tract infection 2000    Vision disorder 2000     Past Surgical  History:   Procedure Laterality Date    ARTHROPLASTY CARPOMETACARPAL (THUMB JOINT)  05/02/2014    Procedure: ARTHROPLASTY CARPOMETACARPAL (THUMB JOINT);  Surgeon: Carina Panda MD;  Location: [facility]    BACK SURGERY  1/6/2012    removed during Transplant    BIOPSY      BREAST SURGERY  05/2015    CHOLECYSTECTOMY  01/01/2004    COLONOSCOPY  07/18/2014    Procedure: COLONOSCOPY;  Surgeon: Aurora Sahu MD;  [facility]: [facility]    COLONOSCOPY N/A 08/01/2017    Procedure: COLONOSCOPY;  Colonoscopy and upper endoscopy;  Surgeon: Deirdre Harris MD;  [facility]: [facility]    ENDOSCOPIC INSERTION TUBE JEJUNOSTOMY N/A 05/02/2025    Procedure: Esophagogastroduodenoscopy, Jejunopexy, JEJUNOSTOMY TUBE PLACEMENT, GASTROSTOMY TUBE EXCHANGE;  Surgeon: Jose Francisco Perdomo MD;  [facility]    ENDOSCOPIC RETROGRADE CHOLANGIOPANCREATOGRAM      ENDOSCOPIC RETROGRADE CHOLANGIOPANCREATOGRAM  04/19/2011    Procedure:ENDOSCOPIC RETROGRADE CHOLANGIOPANCREATOGRAM; Pancreatic Stent Placement      ENDOSCOPIC RETROGRADE CHOLANGIOPANCREATOGRAM  05/26/2011    Procedure:ENDOSCOPIC RETROGRADE CHOLANGIOPANCREATOGRAM; with Pancreatic Stent Removal; Surgeon:DALE MIMS; Location[facility]    ENDOSCOPY UPPER, COLONOSCOPY, COMBINED  04/25/2012    Procedure:COMBINED ENDOSCOPY UPPER, COLONOSCOPY; Enteroscopy with Bile Duct Stent Removal, Colonoscopy  *Latex Safe Room*; Surgeon:GRACY GODWINIQ; [facility]    ESOPHAGOSCOPY, GASTROSCOPY, DUODENOSCOPY (EGD), COMBINED  05/26/2011    Procedure:COMBINED ESOPHAGOSCOPY, GASTROSCOPY, DUODENOSCOPY (EGD); Surgeon:DALE MIMS; [facility]    ESOPHAGOSCOPY, GASTROSCOPY, DUODENOSCOPY (EGD), COMBINED N/A 10/30/2014    Procedure: COMBINED ESOPHAGOSCOPY, GASTROSCOPY, DUODENOSCOPY (EGD), BIOPSY SINGLE OR MULTIPLE;  Surgeon: Sarai Moon MD;  [facility]    ESOPHAGOSCOPY, GASTROSCOPY, DUODENOSCOPY (EGD), COMBINED Left 07/06/2015    Procedure:  COMBINED ESOPHAGOSCOPY, GASTROSCOPY, DUODENOSCOPY (EGD), BIOPSY SINGLE OR MULTIPLE;  Surgeon: Thomas Estrada MD;  [facility]    ESOPHAGOSCOPY, GASTROSCOPY, DUODENOSCOPY (EGD), COMBINED N/A 07/08/2016    Procedure: COMBINED ESOPHAGOSCOPY, GASTROSCOPY, DUODENOSCOPY (EGD), BIOPSY SINGLE OR MULTIPLE;  Surgeon: Eloy Klein MD;  [facility]    ESOPHAGOSCOPY, GASTROSCOPY, DUODENOSCOPY (EGD), COMBINED N/A 08/04/2016    Procedure: COMBINED ESOPHAGOSCOPY, GASTROSCOPY, DUODENOSCOPY (EGD), BIOPSY SINGLE OR MULTIPLE;  Surgeon: Jason Brown MD;  [facility]    ESOPHAGOSCOPY, GASTROSCOPY, DUODENOSCOPY (EGD), COMBINED N/A 08/01/2017    Procedure: COMBINED ESOPHAGOSCOPY, GASTROSCOPY, DUODENOSCOPY (EGD);;  Surgeon: Deirdre Harris MD;  [facility]    ESOPHAGOSCOPY, GASTROSCOPY, DUODENOSCOPY (EGD), COMBINED N/A 06/12/2019    Procedure: ESOPHAGOGASTRODUODENOSCOPY (EGD);  Surgeon: Jose Francisco Perdomo MD;  [facility]    GYN SURGERY      Hysterectomy and USO    [facility] ENDOSCOPY W EUS  07/20/2011    Procedure:COMBINED ENDOSCOPIC ULTRASOUND, ESOPHAGOSCOPY, GASTROSCOPY, DUODENOSCOPY (EGD); Surgeon:DARVIN DONOHUE; [facility]    HERNIORRHAPHY VENTRAL N/A 09/15/2016    Procedure: HERNIORRHAPHY VENTRAL;  Surgeon: Juanita Bernabe MD;  [facility]    HYSTERECTOMY  1997 or 1998    USO    INCISION AND DRAINAGE ABDOMEN WASHOUT, COMBINED  08/16/2012    Procedure: COMBINED INCISION AND DRAINAGE ABDOMEN WASHOUT;  ,debridement and Drainage Post Appendectomy;  Surgeon: Ron Austin MD;  [facility]    INJECT TRANSVERSUS ABDOMINIS PLANE (TAP) BLOCK BILATERAL Bilateral 05/26/2016    Procedure: INJECT TRANSVERSUS ABDOMINIS PLANE (TAP) BLOCK BILATERAL;  Surgeon: Leonard Mccallum MD;  [facility]    IR JEJUNOSTOMY TUBE CHANGE  7/31/2025    IR NG TUBE PLACEMENT REQ RAD & FLUORO  12/04/2017    IR NG TUBE PLACEMENT REQ RAD & FLUORO  07/07/2025    IR NG TUBE  PLACEMENT REQ RAD & FLUORO  07/03/2025    LAPAROSCOPIC APPENDECTOMY  07/30/2012    Procedure: LAPAROSCOPIC APPENDECTOMY;  Open Appendectomy;  Surgeon: Ron Austin MD;  [facility]    PANCREATECTOMY, TRANSPLANT AUTO ISLET CELL, COMBINED  01/06/2012    Procedure:COMBINED PANCREATECTOMY, TRANSPLANT AUTO ISLET CELL; Total  Pancreatectomy, Auto Islet Transplant, splenectomy, 18fr. transgastric-jejunal feeding tube placement, liver biopsy; Surgeon:PALAK LEE; [facility]    PICC DOUBLE LUMEN PLACEMENT Right 04/19/2025    5FR DL PICC, brachial medial vein. L-38cm, 3cm out.    PICC INSERTION - DOUBLE LUMEN Right 04/30/2025    39-3cm, Medial brachial vein    [place] STOMACH SURGERY PROCEDURE UNLISTED      REPLACE GASTROSTOMY TUBE, PERCUTANEOUS N/A 08/30/2017    Procedure: REPLACE GASTROSTOMY TUBE, PERCUTANEOUS;  GJ Tube Change;  Surgeon: Jose Nath PA-C;  [facility]    SOFT TISSUE SURGERY  05/0412    SPLENECTOMY      TRANSPLANT  1/6/2012    auto Islet transplant     acetaminophen (TYLENOL) 325 MG tablet  Acetone, Urine, Test (KETONE TEST) STRP  Alcohol Swabs PADS  alendronate (FOSAMAX) 70 MG tablet  alum & mag hydroxide-simethicone (MYLANTA/MAALOX) 200-200-25 MG CHEW chewable tablet  Continuous Glucose  (DEXCOM G7 ) RICARDO  Continuous Glucose Sensor (DEXCOM G7 SENSOR) MISC  CONTOUR NEXT TEST test strip  cyclobenzaprine (FLEXERIL) 10 MG tablet  diclofenac (VOLTAREN) 1 % topical gel  Digestive Enzyme Cartridge (RELIZORB) Device  droNABinol (MARINOL) 5 MG capsule  DULoxetine (CYMBALTA) 30 MG capsule  DULoxetine (CYMBALTA) 60 MG EC capsule  famotidine (PEPCID) 20 MG tablet  furosemide (LASIX) 40 MG tablet  gabapentin (NEURONTIN) 400 MG capsule  Glucagon (GVOKE HYPOPEN 2-PACK) 1 MG/0.2ML pen  glycerin (ADULT) 2 g suppository  hydrocortisone (CORTEF) 10 MG tablet  hydrocortisone (CORTEF) 5 MG tablet  hydrocortisone sodium succinate PF (SOLU-CORTEF) 100 MG injection  Injection  Device for insulin (NOVOPEN ECHO) RICARDO  insulin aspart (NOVOLOG PEN) 100 UNIT/ML pen  insulin aspart (NOVOLOG PEN) 100 UNIT/ML pen  Insulin Disposable Pump (OMNIPOD 5 G6 INTRO, GEN 5,) KIT  insulin glargine (LANTUS PEN) 100 UNIT/ML pen  insulin  UNIT/ML injection  insulin pen needle (PIP PEN NEEDLES 32G X 4MM) 32G X 4 MM miscellaneous  [place] 1.5 Peptide Plain 325 mL  levothyroxine (SYNTHROID/LEVOTHROID) 125 MCG tablet  Lidocaine (LIDOCARE) 4 % Patch  linaclotide (LINZESS) 145 MCG capsule  lipase-protease-amylase (CREON 12) 80768-22716-03188 units CPEP  metoclopramide (REGLAN) 5 MG tablet  naloxone (NARCAN) 4 MG/0.1ML nasal spray  Nutritional Supplements (BOOST HIGH PROTEIN) LIQD  ondansetron (ZOFRAN) 4 MG tablet  oxyCODONE (ROXICODONE) 5 MG tablet  pantoprazole (PROTONIX) 40 MG EC tablet  polyethylene glycol (MIRALAX) 17 GM/Dose powder  potassium chloride ER (KLOR-CON M) 20 MEQ CR tablet  senna-docusate (SENOKOT-S/PERICOLACE) 8.6-50 MG tablet  [place] Container MISC  simethicone (MYLICON) 80 MG chewable tablet  sodium chloride (OCEAN) 0.65 % nasal spray  sodium chloride, PF, 0.9% PF flush  sucralfate (CARAFATE) 1 GM tablet  SUMAtriptan (IMITREX) 50 MG tablet  thiamine (B-1) 100 MG tablet  topiramate (TOPAMAX) 100 MG tablet  traZODone (DESYREL) 150 MG tablet      Allergies   Allergen Reactions    Corticosteroids Other (See Comments)     All oral, IV and injectable steroids are contraindicated (unless in life threatening situations) in Islet Auto transplant recipients. They can cause irreversible loss of islet cell function. Please contact patient's transplant care coordinator YURI Cortez RN at [phone]/pager [phone] and/or endocrinologist prior to administration.      Chocolate Flavoring Agent (Non-Screening) Rash     Breaks out when eats chocolate     Family History  Family History   Problem Relation Age of Onset    Hypertension Mother     Diabetes Mother     Osteoporosis Mother      Cerebrovascular Disease Mother     Breast Cancer Mother     Depression Mother     Anxiety Disorder Mother     Heart Disease Mother     Cancer Mother     Arthritis Mother     Cancer Father         pancreatic cancer    Other Cancer Father     Asthma Father     Thyroid Disease Father     Diabetes Maternal Grandmother     Cardiovascular Maternal Grandmother     Coronary Artery Disease Maternal Grandmother     Hypertension Maternal Grandmother     Cerebrovascular Disease Maternal Grandmother     Breast Cancer Maternal Grandmother     Depression Maternal Grandmother     Osteoporosis Maternal Grandmother     Obesity Maternal Grandmother     Heart Disease Maternal Grandmother     Allergies Maternal Grandmother     Cancer Maternal Grandfather         lung cancer    Cancer Sister         brain    Hypertension Sister     Cancer Sister         liver cancer    Alcohol/Drug Paternal Grandfather     Alzheimer Disease Paternal Grandfather     Hypertension Sister     Depression Sister     Anxiety Disorder Sister     Asthma Sister     Cancer Sister     Alcohol/Drug Sister     Asthma Son     Musculoskeletal Disorder Other     Depression Maternal Half-Sister     Anxiety Disorder Maternal Half-Sister     Mental Illness Maternal Half-Sister     Substance Abuse Maternal Half-Sister     Anesthesia Reaction Maternal Half-Sister     Asthma Maternal Half-Sister     Asthma Son     Asthma Maternal Half-Sister      Social History   Social History     Tobacco Use    Smoking status: Never    Smokeless tobacco: Never   Vaping Use    Vaping status: Never Used   Substance Use Topics    Alcohol use: No     Alcohol/week: 0.0 standard drinks of alcohol    Drug use: No      Review of Systems   Constitutional:  Negative for fever.   Respiratory:  Negative for cough and shortness of breath.    Cardiovascular:  Negative for chest pain.   Gastrointestinal:  Positive for abdominal pain and nausea.   Skin:  Negative for rash.   All other systems reviewed and  are negative.      Physical Exam   BP: 129/78  Pulse: 89  Temp: 98.9  F (37.2  C)  Resp: 18  SpO2: 97 %  Physical Exam  Constitutional:       General: She is not in acute distress.  Pulmonary:      Effort: Pulmonary effort is normal.   Abdominal:      General: Abdomen is flat.      Tenderness: There is generalized abdominal tenderness and tenderness in the right lower quadrant.       Neurological:      Mental Status: She is alert.   Psychiatric:         Behavior: Behavior normal.           ED Course, Procedures, & Data     ED Course as of 07/31/25 2117   Thu Jul 31, 2025   1414 IR paged for consult and J tube placement.   1433 Discussed with IR Dr. Pulliam, who will consult with patient to consider placement.   1446 Last meal was 10:30 AM and last drink was about 3 hours prior to arrival.   1554 Glucose(!!): 699  Alert and oriented x 4.  Do not suspect HHS.  Given 4 units of insulin.   1619 Glucose(!!): 732  Given 1 Lbolus and started on 4 units of insulin   1623 IR called and states that that was possibility of an open track regarding the J stoma and they were unable to place the J-tube.  Did recommend general surgery consult and rule out peritonitis   1637 Discussed case with general surgery who recommends GI consult given that they did J tube back in April.   1653 Anion Gap: 12  Gap reassuring.  No rapid breathing.  Low suspicion for DKA   1732 GI paged by [facility] for consult.   1740 Case discussed with GI who will review the case and return call   1748 Discussed with GI, Dr. Frye.  He recommends patient be kept n.p.o. at midnight, admit to the medicine team and GI will consult possible surgery   1826 [facility] [place] [facility] notified to page hospitalist for admission.     Procedures                Results for orders placed or performed during the hospital encounter of 07/31/25   CT Abdomen Pelvis w Contrast    Impression    RESIDENT PRELIMINARY INTERPRETATION  IMPRESSION:  1. Jejunostomy tube is not visualized within the  jejunostomy tract  over the left hemiabdomen. The tract appears intact extending to a  loop of small bowel along the left hemiabdomen wall. No fluid  collections or significant inflammatory stranding along the tract.  Gastrostomy tube remains in place.  2. Large stool burden seen throughout the colon, and inspissated stool  within the distal small bowel. Suggestive of slow motility, and likely  constipation.  3. Stable surgical changes of the abdomen as detailed above.   IR Jejunostomy Tube Change    Impression    IMPRESSION:  Unable to re-access left upper quadrant jejunostomy stoma.    Plan:   - Recommend surgery consult for surgical options for regaining  jejunostomy access.  - There is a patent channel between the left lower quadrant ostomy and  the perineum, raising concern for source of peritonitis.    Findings and plan were discussed with patient's primary team by Dr. Nunes.  _______________________________________________________________    PROCEDURE SUMMARY:  -Left lower quadrant jejunostomy stoma was accessed with a TONEY 1  catheter and contrast demonstrating access into the peritoneum.    PROCEDURE DETAILS:    Pre-procedure  Consent: Informed consent for the procedure including risks, benefits  and alternatives was obtained and time-out was performed prior to the  procedure.  Preparation: The site was prepared and draped using maximal sterile  barrier technique including cutaneous antisepsis.    Anesthesia/sedation  Level of anesthesia/sedation: No sedation  Anesthesia/sedation administered by: Independent trained observer  under attending supervision with continuous monitoring of the  patient?s level of consciousness and physiologic status  Total intra-service sedation time (minutes): 0    Procedure summary  The left left lower quadrant stoma was accessed with a TONEY 1 and  Glidewire. No resistance was encountered while advancing the system as  a unit. The guidewire was retracted, water-soluble contrast  was  injected, which demonstrated outlining of the intestines suggestive of  intraperitoneal access.    The catheter was retracted and a few attempts were made in order to  regain access into the bowel. Attempts are unsuccessful. The procedure  was terminated.    Radiation Dose  Fluoroscopy time (minutes):4.2    Additional Details  Additional description of procedure: None  Acute procedural success: No  Registry event: V/3/g  Device used: None  Equipment details: None  Unique Device Identifiers: Not available  Specimens removed: None  Estimated blood loss (mL): Less than 10  Standardized report: [email or web address]    Attestation  Signer name: AURORA BRUNO MD    I, AURORA BRUNO MD, attest that I was present for all critical  portions of the procedure and was immediately available to provide  guidance and assistance during the remainder of the procedure.    I have personally reviewed the examination and initial interpretation  and I agree with the findings.    AURORA BRUNO MD         SYSTEM ID:  [id number]   Comprehensive Metabolic Panel (Limited Occurrences)   Result Value Ref Range    Sodium 132 (L) 135 - 145 mmol/L    Potassium 4.5 3.4 - 5.3 mmol/L    Carbon Dioxide (CO2) 28 22 - 29 mmol/L    Anion Gap 12 7 - 15 mmol/L    Urea Nitrogen 4.3 (L) 8.0 - 23.0 mg/dL    Creatinine 0.39 (L) 0.51 - 0.95 mg/dL    GFR Estimate >90 >60 mL/min/1.73m2    Calcium 9.5 8.8 - 10.4 mg/dL    Chloride 92 (L) 98 - 107 mmol/L    Glucose 732 (HH) 70 - 99 mg/dL    Alkaline Phosphatase 170 (H) 40 - 150 U/L    AST      ALT      Protein Total 6.8 6.4 - 8.3 g/dL    Albumin 3.9 3.5 - 5.2 g/dL    Bilirubin Total 0.2 <=1.2 mg/dL   Lactic Acid Whole Blood with 1X Repeat in 2 HR when >2   Result Value Ref Range    Lactic Acid, Initial 1.7 0.7 - 2.0 mmol/L    Glucose 699 (HH) 70 - 99 mg/dL   Result Value Ref Range    Lipase 7 (L) 13 - 60 U/L   INR   Result Value Ref Range    INR 0.97 0.85 - 1.15    PT 12.9 11.8 - 14.8 Seconds    CBC with platelets and differential   Result Value Ref Range    WBC Count 5.1 4.0 - 11.0 10e3/uL    RBC Count 4.25 3.80 - 5.20 10e6/uL    Hemoglobin 12.6 11.7 - 15.7 g/dL    Hematocrit 37.4 35.0 - 47.0 %    MCV 88 78 - 100 fL    MCH 29.6 26.5 - 33.0 pg    MCHC 33.7 31.5 - 36.5 g/dL    RDW 11.2 10.0 - 15.0 %    Platelet Count 299 150 - 450 10e3/uL    % Neutrophils 63 %    % Lymphocytes 29 %    % Monocytes 7 %    % Eosinophils 1 %    % Basophils 0 %    % Immature Granulocytes 0 %    NRBCs per 100 WBC 0 <1 /100    Absolute Neutrophils 3.2 1.6 - 8.3 10e3/uL    Absolute Lymphocytes 1.5 0.8 - 5.3 10e3/uL    Absolute Monocytes 0.4 0.0 - 1.3 10e3/uL    Absolute Eosinophils 0.0 0.0 - 0.7 10e3/uL    Absolute Basophils 0.0 0.0 - 0.2 10e3/uL    Absolute Immature Granulocytes 0.0 <=0.4 10e3/uL    Absolute NRBCs 0.0 10e3/uL   Glucose by meter   Result Value Ref Range    GLUCOSE BY METER POCT 496 (H) 70 - 99 mg/dL   Glucose by meter   Result Value Ref Range    GLUCOSE BY METER POCT 432 (H) 70 - 99 mg/dL   Magnesium (Limited Occurrences)   Result Value Ref Range    Magnesium 1.8 1.7 - 2.3 mg/dL   Phosphorus (Limited Occurrences)   Result Value Ref Range    Phosphorus 3.1 2.5 - 4.5 mg/dL   Extra Purple Top Tube   Result Value Ref Range    Hold Specimen JI      Medications   lidocaine (PF) (XYLOCAINE) 1 % injection 1-30 mL (has no administration in time range)   lidocaine (XYLOCAINE) 2 % external gel 10 mL (has no administration in time range)   sodium chloride 0.9 % infusion (has no administration in time range)   senna-docusate (SENOKOT-S/PERICOLACE) 8.6-50 MG per tablet 1 tablet (has no administration in time range)     Or   senna-docusate (SENOKOT-S/PERICOLACE) 8.6-50 MG per tablet 2 tablet (has no administration in time range)   ondansetron (ZOFRAN ODT) ODT tab 4 mg (has no administration in time range)     Or   ondansetron (ZOFRAN) injection 4 mg (has no administration in time range)   prochlorperazine (COMPAZINE) injection  10 mg (has no administration in time range)     Or   prochlorperazine (COMPAZINE) tablet 10 mg (has no administration in time range)   acetaminophen (TYLENOL) tablet 650 mg (650 mg Oral $Given 7/31/25 2044)     Or   acetaminophen (TYLENOL) Suppository 650 mg ( Rectal See Alternative 7/31/25 2044)   cyclobenzaprine (FLEXERIL) tablet 10 mg (has no administration in time range)   droNABinol (MARINOL) capsule 5 mg (has no administration in time range)   DULoxetine (CYMBALTA) DR capsule 30 mg (has no administration in time range)   DULoxetine (CYMBALTA) DR capsule 60 mg (has no administration in time range)   famotidine (PEPCID) tablet 20 mg (has no administration in time range)   furosemide (LASIX) tablet 40 mg ( Oral Automatically Held 8/3/25 0800)   hydrocortisone (CORTEF) tablet 10 mg (has no administration in time range)   hydrocortisone (CORTEF) tablet 15 mg (has no administration in time range)   insulin glargine (LANTUS PEN) injection 10 Units (has no administration in time range)   insulin NPH injection 10 Units ( Subcutaneous Automatically Held 8/3/25 2000)   levothyroxine (SYNTHROID/LEVOTHROID) tablet 125 mcg (has no administration in time range)   linaclotide (LINZESS) capsule 145 mcg (has no administration in time range)   lipase-protease-amylase (CREON 12) 70467-86078-84372 units per capsule 8 capsule (has no administration in time range)   metoclopramide (REGLAN) tablet 10 mg (10 mg Oral $Given 7/31/25 2044)   oxyCODONE (ROXICODONE) tablet 5 mg (5 mg Oral $Given 7/31/25 2044)   pantoprazole (PROTONIX) EC tablet 40 mg (40 mg Oral $Given 7/31/25 2044)   simethicone (MYLICON) chewable tablet 80 mg (has no administration in time range)   sucralfate (CARAFATE) tablet 1 g (has no administration in time range)   thiamine (B-1) tablet 100 mg (has no administration in time range)   topiramate (TOPAMAX) tablet 100 mg ( Oral Automatically Held 8/3/25 2000)   traZODone (DESYREL) tablet 150 mg (has no administration in  time range)   glucose gel 15-30 g (has no administration in time range)     Or   dextrose 50 % injection 25-50 mL (has no administration in time range)     Or   glucagon injection 1 mg (has no administration in time range)   insulin aspart (NovoLOG) injection (RAPID ACTING) (has no administration in time range)   insulin aspart (NovoLOG) injection (RAPID ACTING) (has no administration in time range)   gabapentin (NEURONTIN) capsule 400 mg (has no administration in time range)     And   gabapentin (NEURONTIN) capsule 800 mg (has no administration in time range)   sodium chloride 0.9% BOLUS 1,000 mL (0 mLs Intravenous Stopped 7/31/25 2047)   iodixanol (VISIPAQUE 320) injection 50 mL (5 mLs Tube $Given 7/31/25 1631)   insulin aspart (NovoLOG) injection (RAPID ACTING) (4 Units Subcutaneous $Given 7/31/25 1723)   morphine (PF) injection 4 mg (4 mg Intravenous $Given 7/31/25 1722)   iopamidol (ISOVUE-370) solution 62 mL (62 mLs Intravenous $Given 7/31/25 1748)   sodium chloride (PF) 0.9% PF flush 66 mL (66 mLs Intravenous $Given 7/31/25 1748)   morphine (PF) injection 4 mg (4 mg Intravenous $Given 7/31/25 1845)          Critical care was not performed.     Medical Decision Making  The patient's presentation was of high complexity (a chronic illness severe exacerbation, progression, or side effect of treatment).    The patient's evaluation involved:  review of external note(s) from 2 sources (see separate area of note for details)  review of 2 test result(s) ordered prior to this encounter (see separate area of note for details)  strong consideration of a test (see separate area of note for details) that was ultimately deferred  ordering and/or review of 3+ test(s) in this encounter (see separate area of note for details)  discussion of management or test interpretation with another health professional (GI, IR, gen surg)    The patient's management necessitated high risk (a decision regarding  hospitalization).    Assessment & Plan    Differential diagnosis includes diverticulitis, peritonitis, colitis, obstruction, and others considered.    On initial presentation patient appears alert and well-appearing.  She has a PEG tube to the left upper quadrant that is clean and intact.  On the left lower quadrant, there is a chest stoma with no significant erythema or warmth.  Given that it has been out for 4 days, we will consider IR consult.  With the new lower abdominal pain, will plan for imaging to rule out any acute cause.      CT reassuring with no evidence of abscess or any acute intra-abdominal cause.  Lab work reassuring aside from elevated blood sugar.  However, gap is normal so do not suspect HHS or DKA at this time.    IR was consulted and unfortunately was not able to place the J-tube back in place.  Subsequently GI was consulted.    Plan: After consultation with GI, plan for admission to the hospital medicine team with GI as consult.  Patient is in agreement with this plan.      I have reviewed the nursing notes. I have reviewed the findings, diagnosis, plan and need for follow up with the patient.    New Prescriptions    No medications on file       Final diagnoses:   Hyperglycemia   Jejunostomy tube fell out       Ranjeet Mendoza CNP  [facility] EMERGENCY DEPARTMENT  7/31/2025     Ranjeet Mendoza  07/31/25 [id number]     needed      Rich Shaffer,   Emergency Medicine         Rich Shaffer,   08/07/25 0232

## 2025-07-31 NOTE — IR NOTE
Patient Name: Chantell iKdd  Medical Record Number: [id number]  Today's Date: 7/31/2025    Procedure: J-Tube Replacement - Aborted  Proceduralist: MD Des Puri MD    Procedure Start: 1555  Procedure end: 1617  Sedation medications administered: Local Only     Report given to: ED, RN    Other Notes: Pt arrived to IR room 4 from ED. Consent reviewed. Pt denies any questions or concerns regarding procedure. Pt positioned supine and monitored per protocol. Pt tolerated procedure without any noted complications. Pt transferred back to ED.

## 2025-07-31 NOTE — CONSULTS
"      [facility] Consult Service Note  Interventional Radiology  07/31/25   2:40 PM    Consult Requested: \"URGENT\", \"J tube placement\", \"J tube fell out\"    Recommendations/Plan:    Patient is approved for attempt at replacement of her jejunostomy tube (tube fell out 4 days ago, 16 Fr).   Timing of procedure is TBD based on IR staffing/schedule and triage.  Please contact the IR charge RN at [phone] for estimated time of procedure.     Labs WNL for procedure (new INR and plt ordered).    Orders entered for procedure. Patient last ate at 10:30 this morning. Plan for local only, no sedation.  Medications to be held include: none.  Informed consent will be completed prior to procedure.     Case and imaging discussed with IR attending Dr. Drew Puri MD.  Recommendations were reviewed with requesting team.    History of Present Illness:  59 year old female with history of chronic pancreatitis s/p total pancreatectomy with islet autotransplantation (2012), post TPAIT type I diabetes, s/p PEG-J tube replacement (5/2/2025) and adrenal insufficiency, who presents to the emergency department with J-tube dislodgment.  Patient reports 4 days ago she was in the kitchen when she noticed the J-tube fell out.  She was unable to get a ride to the ED during that time.  She reports the last time she had it fell out was July 3 and she requires IR intervention to have it placed back.     Pertinent Imaging Reviewed:   No results found for this or any previous visit (from the past 24 hours).    Expected date of discharge:       Vitals:   /78   Pulse 89   Temp 98.9  F (37.2  C) (Oral)   Resp 18   LMP  (LMP Unknown)   SpO2 97%     Pertinent Labs Reviewed:  CBC:  Lab Results   Component Value Date    WBC 6.2 06/09/2025    WBC 7.7 05/24/2025    WBC 8.8 05/21/2025    WBC 7.9 11/12/2020    WBC 8.6 11/11/2020    WBC 8.2 11/10/2020     Lab Results   Component Value Date    HGB 12.8 06/09/2025    HGB 11.5 05/24/2025 "    HGB 11.4 05/21/2025    HGB 10.6 11/13/2020    HGB 9.5 11/12/2020    HGB 9.7 11/11/2020     Lab Results   Component Value Date     06/09/2025     05/24/2025     05/21/2025     11/12/2020     11/11/2020     11/10/2020    INR:  Lab Results   Component Value Date    INR 0.92 05/01/2025    INR 1.06 11/12/2020    PTT 25 11/09/2020          COVID Results:  COVID-19 Antibody Results, Testing for Immunity           No data to display              COVID-19 PCR Results           No data to display             Potassium   Date Value Ref Range Status   06/18/2025 3.3 (L) 3.4 - 5.3 mmol/L Final   11/12/2020 3.7 3.4 - 5.3 mmol/L Final          Kaykay Lee PA-C  Interventional Radiology

## 2025-07-31 NOTE — TELEPHONE ENCOUNTER
[facility] Prior Authorization Team   Phone: [phone]      PRIOR AUTHORIZATION DENIED    Medication: GABAPENTIN 400 MG PO CAPS  Insurance Company: [employer] - Phone [phone] Fax [phone]  Denial Date: 7/31/2025  Denial Reason(s):     Appeal Information: Appeal is not available.     Patient Notified: No. The [employer] [facility] PA Team does not notify of the denial outcomes as the patient often will ask what their provider will prescribe in place of the denied medication, or additional information in regards to other therapies they can take in place of the denied medication.  This is not something we can advise on in our department.

## 2025-08-01 ENCOUNTER — APPOINTMENT (OUTPATIENT)
Dept: GENERAL RADIOLOGY | Facility: CLINIC | Age: 60
DRG: 393 | End: 2025-08-01
Attending: STUDENT IN AN ORGANIZED HEALTH CARE EDUCATION/TRAINING PROGRAM
Payer: MEDICARE

## 2025-08-01 ENCOUNTER — APPOINTMENT (OUTPATIENT)
Dept: GENERAL RADIOLOGY | Facility: CLINIC | Age: 60
DRG: 393 | End: 2025-08-01
Payer: MEDICARE

## 2025-08-01 PROBLEM — T85.598A FEEDING TUBE BLOCKED, INITIAL ENCOUNTER: Status: ACTIVE | Noted: 2025-08-01

## 2025-08-01 LAB
ANION GAP SERPL CALCULATED.3IONS-SCNC: 7 MMOL/L (ref 7–15)
BUN SERPL-MCNC: 3.4 MG/DL (ref 8–23)
CA-I BLD-MCNC: 4.1 MG/DL (ref 4.4–5.2)
CALCIUM SERPL-MCNC: 8.3 MG/DL (ref 8.8–10.4)
CHLORIDE SERPL-SCNC: 104 MMOL/L (ref 98–107)
CREAT SERPL-MCNC: 0.3 MG/DL (ref 0.51–0.95)
CRP SERPL-MCNC: <3 MG/L
EGFRCR SERPLBLD CKD-EPI 2021: >90 ML/MIN/1.73M2
ERYTHROCYTE [DISTWIDTH] IN BLOOD BY AUTOMATED COUNT: 11.2 % (ref 10–15)
EST. AVERAGE GLUCOSE BLD GHB EST-MCNC: 479 MG/DL
GLUCOSE BLDC GLUCOMTR-MCNC: 123 MG/DL (ref 70–99)
GLUCOSE BLDC GLUCOMTR-MCNC: 125 MG/DL (ref 70–99)
GLUCOSE BLDC GLUCOMTR-MCNC: 135 MG/DL (ref 70–99)
GLUCOSE BLDC GLUCOMTR-MCNC: 161 MG/DL (ref 70–99)
GLUCOSE BLDC GLUCOMTR-MCNC: 205 MG/DL (ref 70–99)
GLUCOSE SERPL-MCNC: 233 MG/DL (ref 70–99)
HBA1C MFR BLD: 18.3 %
HCO3 SERPL-SCNC: 28 MMOL/L (ref 22–29)
HCT VFR BLD AUTO: 33.2 % (ref 35–47)
HGB BLD-MCNC: 11.2 G/DL (ref 11.7–15.7)
HOLD SPECIMEN: NORMAL
INR PPP: 1.09 (ref 0.85–1.15)
MAGNESIUM SERPL-MCNC: 1.8 MG/DL (ref 1.7–2.3)
MCH RBC QN AUTO: 29.9 PG (ref 26.5–33)
MCHC RBC AUTO-ENTMCNC: 33.7 G/DL (ref 31.5–36.5)
MCV RBC AUTO: 89 FL (ref 78–100)
PHOSPHATE SERPL-MCNC: 2.3 MG/DL (ref 2.5–4.5)
PHOSPHATE SERPL-MCNC: 2.9 MG/DL (ref 2.5–4.5)
PLATELET # BLD AUTO: 273 10E3/UL (ref 150–450)
POTASSIUM SERPL-SCNC: 2.9 MMOL/L (ref 3.4–5.3)
POTASSIUM SERPL-SCNC: 3.6 MMOL/L (ref 3.4–5.3)
PROTHROMBIN TIME: 14.4 SECONDS (ref 11.8–14.8)
RBC # BLD AUTO: 3.75 10E6/UL (ref 3.8–5.2)
SODIUM SERPL-SCNC: 139 MMOL/L (ref 135–145)
UPPER GI ENDOSCOPY: NORMAL
WBC # BLD AUTO: 6.6 10E3/UL (ref 4–11)

## 2025-08-01 PROCEDURE — 250N000013 HC RX MED GY IP 250 OP 250 PS 637

## 2025-08-01 PROCEDURE — 87040 BLOOD CULTURE FOR BACTERIA: CPT

## 2025-08-01 PROCEDURE — 360N000082 HC SURGERY LEVEL 2 W/ FLUORO, PER MIN: Performed by: INTERNAL MEDICINE

## 2025-08-01 PROCEDURE — 258N000003 HC RX IP 258 OP 636

## 2025-08-01 PROCEDURE — 99418 PROLNG IP/OBS E/M EA 15 MIN: CPT

## 2025-08-01 PROCEDURE — 85610 PROTHROMBIN TIME: CPT | Performed by: PHYSICIAN ASSISTANT

## 2025-08-01 PROCEDURE — 99223 1ST HOSP IP/OBS HIGH 75: CPT | Performed by: DIETITIAN, REGISTERED

## 2025-08-01 PROCEDURE — 250N000011 HC RX IP 250 OP 636: Mod: JW

## 2025-08-01 PROCEDURE — 96372 THER/PROPH/DIAG INJ SC/IM: CPT | Performed by: RADIOLOGY

## 2025-08-01 PROCEDURE — 0DH63UZ INSERTION OF FEEDING DEVICE INTO STOMACH, PERCUTANEOUS APPROACH: ICD-10-PCS | Performed by: INTERNAL MEDICINE

## 2025-08-01 PROCEDURE — G0378 HOSPITAL OBSERVATION PER HR: HCPCS

## 2025-08-01 PROCEDURE — 83735 ASSAY OF MAGNESIUM: CPT

## 2025-08-01 PROCEDURE — 84132 ASSAY OF SERUM POTASSIUM: CPT | Performed by: STUDENT IN AN ORGANIZED HEALTH CARE EDUCATION/TRAINING PROGRAM

## 2025-08-01 PROCEDURE — 250N000009 HC RX 250: Performed by: RADIOLOGY

## 2025-08-01 PROCEDURE — 272N000002 HC OR SUPPLY OTHER OPNP: Performed by: INTERNAL MEDICINE

## 2025-08-01 PROCEDURE — 36415 COLL VENOUS BLD VENIPUNCTURE: CPT

## 2025-08-01 PROCEDURE — 82947 ASSAY GLUCOSE BLOOD QUANT: CPT | Performed by: PHYSICIAN ASSISTANT

## 2025-08-01 PROCEDURE — 86140 C-REACTIVE PROTEIN: CPT

## 2025-08-01 PROCEDURE — 99233 SBSQ HOSP IP/OBS HIGH 50: CPT

## 2025-08-01 PROCEDURE — 83036 HEMOGLOBIN GLYCOSYLATED A1C: CPT | Performed by: NURSE PRACTITIONER

## 2025-08-01 PROCEDURE — 250N000011 HC RX IP 250 OP 636: Performed by: ANESTHESIOLOGY

## 2025-08-01 PROCEDURE — 999N000141 HC STATISTIC PRE-PROCEDURE NURSING ASSESSMENT: Performed by: INTERNAL MEDICINE

## 2025-08-01 PROCEDURE — 85027 COMPLETE CBC AUTOMATED: CPT | Performed by: PHYSICIAN ASSISTANT

## 2025-08-01 PROCEDURE — 250N000012 HC RX MED GY IP 250 OP 636 PS 637: Performed by: NURSE PRACTITIONER

## 2025-08-01 PROCEDURE — 99418 PROLNG IP/OBS E/M EA 15 MIN: CPT | Performed by: DIETITIAN, REGISTERED

## 2025-08-01 PROCEDURE — 84100 ASSAY OF PHOSPHORUS: CPT

## 2025-08-01 PROCEDURE — 999N000147 HC STATISTIC PT IP EVAL DEFER

## 2025-08-01 PROCEDURE — 82962 GLUCOSE BLOOD TEST: CPT

## 2025-08-01 PROCEDURE — 272N000001 HC OR GENERAL SUPPLY STERILE: Performed by: INTERNAL MEDICINE

## 2025-08-01 PROCEDURE — 250N000011 HC RX IP 250 OP 636: Performed by: PHYSICIAN ASSISTANT

## 2025-08-01 PROCEDURE — 71045 X-RAY EXAM CHEST 1 VIEW: CPT

## 2025-08-01 PROCEDURE — 84100 ASSAY OF PHOSPHORUS: CPT | Performed by: STUDENT IN AN ORGANIZED HEALTH CARE EDUCATION/TRAINING PROGRAM

## 2025-08-01 PROCEDURE — 120N000002 HC R&B MED SURG/OB UMMC

## 2025-08-01 PROCEDURE — 250N000013 HC RX MED GY IP 250 OP 250 PS 637: Performed by: PHYSICIAN ASSISTANT

## 2025-08-01 PROCEDURE — 36415 COLL VENOUS BLD VENIPUNCTURE: CPT | Performed by: PHYSICIAN ASSISTANT

## 2025-08-01 PROCEDURE — 710N000010 HC RECOVERY PHASE 1, LEVEL 2, PER MIN: Performed by: INTERNAL MEDICINE

## 2025-08-01 PROCEDURE — 250N000011 HC RX IP 250 OP 636: Performed by: STUDENT IN AN ORGANIZED HEALTH CARE EDUCATION/TRAINING PROGRAM

## 2025-08-01 PROCEDURE — 82330 ASSAY OF CALCIUM: CPT

## 2025-08-01 PROCEDURE — 71045 X-RAY EXAM CHEST 1 VIEW: CPT | Mod: 26 | Performed by: RADIOLOGY

## 2025-08-01 PROCEDURE — 999N000179 XR SURGERY CARM FLUORO LESS THAN 5 MIN W STILLS

## 2025-08-01 PROCEDURE — 370N000017 HC ANESTHESIA TECHNICAL FEE, PER MIN: Performed by: INTERNAL MEDICINE

## 2025-08-01 RX ORDER — METHYLPREDNISOLONE SODIUM SUCCINATE 40 MG/ML
8 INJECTION INTRAMUSCULAR; INTRAVENOUS ONCE
Status: COMPLETED | OUTPATIENT
Start: 2025-08-01 | End: 2025-08-01

## 2025-08-01 RX ORDER — SODIUM CHLORIDE 9 MG/ML
INJECTION, SOLUTION INTRAVENOUS CONTINUOUS
Status: ACTIVE | OUTPATIENT
Start: 2025-08-01 | End: 2025-08-02

## 2025-08-01 RX ORDER — HYDROMORPHONE HCL IN WATER/PF 6 MG/30 ML
0.2 PATIENT CONTROLLED ANALGESIA SYRINGE INTRAVENOUS ONCE
Status: COMPLETED | OUTPATIENT
Start: 2025-08-01 | End: 2025-08-01

## 2025-08-01 RX ORDER — SIMETHICONE 80 MG
80 TABLET,CHEWABLE ORAL 4 TIMES DAILY
Status: DISCONTINUED | OUTPATIENT
Start: 2025-08-01 | End: 2025-08-03 | Stop reason: HOSPADM

## 2025-08-01 RX ORDER — DEXTROSE MONOHYDRATE 100 MG/ML
INJECTION, SOLUTION INTRAVENOUS CONTINUOUS PRN
Status: DISCONTINUED | OUTPATIENT
Start: 2025-08-01 | End: 2025-08-02

## 2025-08-01 RX ORDER — DEXTROSE MONOHYDRATE 25 G/50ML
25-50 INJECTION, SOLUTION INTRAVENOUS
Status: DISCONTINUED | OUTPATIENT
Start: 2025-08-01 | End: 2025-08-03 | Stop reason: HOSPADM

## 2025-08-01 RX ORDER — OXYCODONE HYDROCHLORIDE 5 MG/1
5 TABLET ORAL EVERY 6 HOURS PRN
Refills: 0 | Status: DISCONTINUED | OUTPATIENT
Start: 2025-08-01 | End: 2025-08-03 | Stop reason: HOSPADM

## 2025-08-01 RX ORDER — DEXTROSE MONOHYDRATE 100 MG/ML
INJECTION, SOLUTION INTRAVENOUS CONTINUOUS PRN
Status: DISCONTINUED | OUTPATIENT
Start: 2025-08-01 | End: 2025-08-03

## 2025-08-01 RX ORDER — TOPIRAMATE 50 MG/1
100 TABLET, FILM COATED ORAL 2 TIMES DAILY
Status: DISCONTINUED | OUTPATIENT
Start: 2025-08-01 | End: 2025-08-03 | Stop reason: HOSPADM

## 2025-08-01 RX ORDER — HYDROMORPHONE HCL IN WATER/PF 6 MG/30 ML
0.2 PATIENT CONTROLLED ANALGESIA SYRINGE INTRAVENOUS EVERY 5 MIN PRN
Status: DISCONTINUED | OUTPATIENT
Start: 2025-08-01 | End: 2025-08-01 | Stop reason: HOSPADM

## 2025-08-01 RX ORDER — ONDANSETRON 4 MG/1
4 TABLET, ORALLY DISINTEGRATING ORAL EVERY 30 MIN PRN
Status: DISCONTINUED | OUTPATIENT
Start: 2025-08-01 | End: 2025-08-01 | Stop reason: HOSPADM

## 2025-08-01 RX ORDER — SUCRALFATE 1 G/1
1 TABLET ORAL 4 TIMES DAILY
Status: DISCONTINUED | OUTPATIENT
Start: 2025-08-01 | End: 2025-08-03 | Stop reason: HOSPADM

## 2025-08-01 RX ORDER — LEVOTHYROXINE SODIUM 125 UG/1
125 TABLET ORAL
Status: DISCONTINUED | OUTPATIENT
Start: 2025-08-02 | End: 2025-08-03 | Stop reason: HOSPADM

## 2025-08-01 RX ORDER — NALOXONE HYDROCHLORIDE 0.4 MG/ML
0.4 INJECTION, SOLUTION INTRAMUSCULAR; INTRAVENOUS; SUBCUTANEOUS
Status: DISCONTINUED | OUTPATIENT
Start: 2025-08-01 | End: 2025-08-03 | Stop reason: HOSPADM

## 2025-08-01 RX ORDER — POLYETHYLENE GLYCOL 3350 17 G/17G
17 POWDER, FOR SOLUTION ORAL 2 TIMES DAILY
Status: DISCONTINUED | OUTPATIENT
Start: 2025-08-01 | End: 2025-08-03 | Stop reason: HOSPADM

## 2025-08-01 RX ORDER — FAMOTIDINE 20 MG/1
20 TABLET, FILM COATED ORAL AT BEDTIME
Status: DISCONTINUED | OUTPATIENT
Start: 2025-08-01 | End: 2025-08-03 | Stop reason: HOSPADM

## 2025-08-01 RX ORDER — NALOXONE HYDROCHLORIDE 0.4 MG/ML
0.2 INJECTION, SOLUTION INTRAMUSCULAR; INTRAVENOUS; SUBCUTANEOUS
Status: DISCONTINUED | OUTPATIENT
Start: 2025-08-01 | End: 2025-08-03 | Stop reason: HOSPADM

## 2025-08-01 RX ORDER — HYDROCORTISONE 10 MG/1
10 TABLET ORAL DAILY
Status: DISCONTINUED | OUTPATIENT
Start: 2025-08-02 | End: 2025-08-03 | Stop reason: HOSPADM

## 2025-08-01 RX ORDER — POTASSIUM CHLORIDE 7.45 MG/ML
10 INJECTION INTRAVENOUS
Status: COMPLETED | OUTPATIENT
Start: 2025-08-01 | End: 2025-08-01

## 2025-08-01 RX ORDER — GABAPENTIN 400 MG/1
400 CAPSULE ORAL 2 TIMES DAILY
Status: DISCONTINUED | OUTPATIENT
Start: 2025-08-01 | End: 2025-08-03 | Stop reason: HOSPADM

## 2025-08-01 RX ORDER — AMOXICILLIN 250 MG
2 CAPSULE ORAL 2 TIMES DAILY PRN
Status: DISCONTINUED | OUTPATIENT
Start: 2025-08-01 | End: 2025-08-03 | Stop reason: HOSPADM

## 2025-08-01 RX ORDER — FLUMAZENIL 0.1 MG/ML
0.2 INJECTION, SOLUTION INTRAVENOUS
Status: ACTIVE | OUTPATIENT
Start: 2025-08-01 | End: 2025-08-02

## 2025-08-01 RX ORDER — NALOXONE HYDROCHLORIDE 0.4 MG/ML
0.1 INJECTION, SOLUTION INTRAMUSCULAR; INTRAVENOUS; SUBCUTANEOUS
Status: DISCONTINUED | OUTPATIENT
Start: 2025-08-01 | End: 2025-08-01 | Stop reason: HOSPADM

## 2025-08-01 RX ORDER — HYDROMORPHONE HCL IN WATER/PF 6 MG/30 ML
0.4 PATIENT CONTROLLED ANALGESIA SYRINGE INTRAVENOUS EVERY 5 MIN PRN
Status: DISCONTINUED | OUTPATIENT
Start: 2025-08-01 | End: 2025-08-01 | Stop reason: HOSPADM

## 2025-08-01 RX ORDER — AMOXICILLIN 250 MG
1 CAPSULE ORAL 2 TIMES DAILY PRN
Status: DISCONTINUED | OUTPATIENT
Start: 2025-08-01 | End: 2025-08-03 | Stop reason: HOSPADM

## 2025-08-01 RX ORDER — SODIUM CHLORIDE, SODIUM LACTATE, POTASSIUM CHLORIDE, CALCIUM CHLORIDE 600; 310; 30; 20 MG/100ML; MG/100ML; MG/100ML; MG/100ML
INJECTION, SOLUTION INTRAVENOUS CONTINUOUS
Status: DISCONTINUED | OUTPATIENT
Start: 2025-08-01 | End: 2025-08-01 | Stop reason: HOSPADM

## 2025-08-01 RX ORDER — FENTANYL CITRATE 50 UG/ML
25 INJECTION, SOLUTION INTRAMUSCULAR; INTRAVENOUS EVERY 5 MIN PRN
Status: DISCONTINUED | OUTPATIENT
Start: 2025-08-01 | End: 2025-08-01 | Stop reason: HOSPADM

## 2025-08-01 RX ORDER — AMOXICILLIN 250 MG
2 CAPSULE ORAL AT BEDTIME
Status: DISCONTINUED | OUTPATIENT
Start: 2025-08-01 | End: 2025-08-03 | Stop reason: HOSPADM

## 2025-08-01 RX ORDER — MORPHINE SULFATE 4 MG/ML
4 INJECTION, SOLUTION INTRAMUSCULAR; INTRAVENOUS
Status: COMPLETED | OUTPATIENT
Start: 2025-08-01 | End: 2025-08-01

## 2025-08-01 RX ORDER — HYDROCORTISONE 10 MG/1
10 TABLET ORAL 2 TIMES DAILY
Status: DISCONTINUED | OUTPATIENT
Start: 2025-08-01 | End: 2025-08-01

## 2025-08-01 RX ORDER — FUROSEMIDE 40 MG/1
40 TABLET ORAL DAILY
Status: DISCONTINUED | OUTPATIENT
Start: 2025-08-02 | End: 2025-08-03 | Stop reason: HOSPADM

## 2025-08-01 RX ORDER — LABETALOL HYDROCHLORIDE 5 MG/ML
10 INJECTION, SOLUTION INTRAVENOUS
Status: DISCONTINUED | OUTPATIENT
Start: 2025-08-01 | End: 2025-08-01 | Stop reason: HOSPADM

## 2025-08-01 RX ORDER — LIDOCAINE 40 MG/G
CREAM TOPICAL
Status: DISCONTINUED | OUTPATIENT
Start: 2025-08-01 | End: 2025-08-01 | Stop reason: HOSPADM

## 2025-08-01 RX ORDER — FENTANYL CITRATE 50 UG/ML
50 INJECTION, SOLUTION INTRAMUSCULAR; INTRAVENOUS EVERY 5 MIN PRN
Status: DISCONTINUED | OUTPATIENT
Start: 2025-08-01 | End: 2025-08-01 | Stop reason: HOSPADM

## 2025-08-01 RX ORDER — ONDANSETRON 2 MG/ML
4 INJECTION INTRAMUSCULAR; INTRAVENOUS EVERY 30 MIN PRN
Status: DISCONTINUED | OUTPATIENT
Start: 2025-08-01 | End: 2025-08-01 | Stop reason: HOSPADM

## 2025-08-01 RX ORDER — GABAPENTIN 400 MG/1
800 CAPSULE ORAL AT BEDTIME
Status: DISCONTINUED | OUTPATIENT
Start: 2025-08-01 | End: 2025-08-03 | Stop reason: HOSPADM

## 2025-08-01 RX ORDER — NICOTINE POLACRILEX 4 MG
15-30 LOZENGE BUCCAL
Status: DISCONTINUED | OUTPATIENT
Start: 2025-08-01 | End: 2025-08-03 | Stop reason: HOSPADM

## 2025-08-01 RX ORDER — DEXTROSE MONOHYDRATE 100 MG/ML
INJECTION, SOLUTION INTRAVENOUS CONTINUOUS PRN
Status: DISCONTINUED | OUTPATIENT
Start: 2025-08-01 | End: 2025-08-01

## 2025-08-01 RX ADMIN — HYDROMORPHONE HYDROCHLORIDE 0.2 MG: 0.2 INJECTION, SOLUTION INTRAMUSCULAR; INTRAVENOUS; SUBCUTANEOUS at 17:30

## 2025-08-01 RX ADMIN — OXYCODONE HYDROCHLORIDE 5 MG: 5 TABLET ORAL at 03:14

## 2025-08-01 RX ADMIN — FAMOTIDINE 20 MG: 20 TABLET, FILM COATED ORAL at 22:45

## 2025-08-01 RX ADMIN — INSULIN ASPART 2 UNITS: 100 INJECTION, SOLUTION INTRAVENOUS; SUBCUTANEOUS at 09:08

## 2025-08-01 RX ADMIN — ONDANSETRON 4 MG: 2 INJECTION INTRAMUSCULAR; INTRAVENOUS at 21:07

## 2025-08-01 RX ADMIN — POTASSIUM CHLORIDE 10 MEQ: 7.46 INJECTION, SOLUTION INTRAVENOUS at 11:38

## 2025-08-01 RX ADMIN — FENTANYL CITRATE 50 MCG: 50 INJECTION, SOLUTION INTRAMUSCULAR; INTRAVENOUS at 19:01

## 2025-08-01 RX ADMIN — TRAZODONE HYDROCHLORIDE 150 MG: 100 TABLET ORAL at 22:45

## 2025-08-01 RX ADMIN — SENNOSIDES, DOCUSATE SODIUM 2 TABLET: 50; 8.6 TABLET, FILM COATED ORAL at 22:45

## 2025-08-01 RX ADMIN — MORPHINE SULFATE 4 MG: 4 INJECTION INTRAVENOUS at 22:57

## 2025-08-01 RX ADMIN — GABAPENTIN 800 MG: 400 CAPSULE ORAL at 22:45

## 2025-08-01 RX ADMIN — SODIUM CHLORIDE: 0.9 INJECTION, SOLUTION INTRAVENOUS at 11:40

## 2025-08-01 RX ADMIN — POTASSIUM CHLORIDE 10 MEQ: 7.46 INJECTION, SOLUTION INTRAVENOUS at 10:22

## 2025-08-01 RX ADMIN — MINERAL OIL 1 ENEMA: 118 ENEMA RECTAL at 14:21

## 2025-08-01 RX ADMIN — HYDROMORPHONE HYDROCHLORIDE 0.2 MG: 0.2 INJECTION, SOLUTION INTRAMUSCULAR; INTRAVENOUS; SUBCUTANEOUS at 19:21

## 2025-08-01 RX ADMIN — POTASSIUM CHLORIDE 10 MEQ: 7.46 INJECTION, SOLUTION INTRAVENOUS at 09:08

## 2025-08-01 RX ADMIN — FENTANYL CITRATE 50 MCG: 50 INJECTION, SOLUTION INTRAMUSCULAR; INTRAVENOUS at 18:54

## 2025-08-01 RX ADMIN — INSULIN GLARGINE 6 UNITS: 100 INJECTION, SOLUTION SUBCUTANEOUS at 14:30

## 2025-08-01 RX ADMIN — ACETAMINOPHEN 650 MG: 325 TABLET ORAL at 21:08

## 2025-08-01 RX ADMIN — METHYLPREDNISOLONE SODIUM SUCCINATE 8 MG: 40 INJECTION, POWDER, FOR SOLUTION INTRAMUSCULAR; INTRAVENOUS at 15:14

## 2025-08-01 RX ADMIN — OXYCODONE HYDROCHLORIDE 5 MG: 5 TABLET ORAL at 21:08

## 2025-08-01 RX ADMIN — POTASSIUM CHLORIDE 10 MEQ: 7.46 INJECTION, SOLUTION INTRAVENOUS at 12:32

## 2025-08-01 ASSESSMENT — ACTIVITIES OF DAILY LIVING (ADL)
ADLS_ACUITY_SCORE: 57
ADLS_ACUITY_SCORE: 61
ADLS_ACUITY_SCORE: 57
ADLS_ACUITY_SCORE: 61
ADLS_ACUITY_SCORE: 57
ADLS_ACUITY_SCORE: 60
ADLS_ACUITY_SCORE: 57
ADLS_ACUITY_SCORE: 60
ADLS_ACUITY_SCORE: 57
ADLS_ACUITY_SCORE: 57
ADLS_ACUITY_SCORE: 61
ADLS_ACUITY_SCORE: 60
ADLS_ACUITY_SCORE: 61
ADLS_ACUITY_SCORE: 60
ADLS_ACUITY_SCORE: 57
ADLS_ACUITY_SCORE: 57
ADLS_ACUITY_SCORE: 60
ADLS_ACUITY_SCORE: 57
ADLS_ACUITY_SCORE: 57

## 2025-08-01 NOTE — PHARMACY-CONSULT NOTE
Pharmacy Tube Feeding Consult    Medication reviewed for administration by feeding tube and for potential food/drug interactions.    Recommendation: Held medications were unheld and changed to FT administration as able. Unhold dronabinol, linzess, and creon once Chantell is able to take PO.    Pharmacy will continue to follow as new medications are ordered.    Lay Basilio RPH on 8/1/2025 at 1:04 PM

## 2025-08-01 NOTE — PROGRESS NOTES
CLINICAL NUTRITION SERVICES - ASSESSMENT NOTE     Nutrition Prescription    RECOMMENDATIONS FOR MDs/PROVIDERS TO ORDER:  Creon with meals, daily Linzess.     Malnutrition Status:    Severe malnutrition in the context of chronic illness/disease    Registered Dietitian Interventions:  Once PEJ replaced, suggest restarting PTA TF regimen:   Goal TF: [employer] Peptide 1.5 (or equivalent) @ 45 mL/hr (1080 mL/day) to provide 1661 kcal, 80 g protein, 149 g CHO, 16 g fiber, 83 g fat (40% from MCTs), and 756 mL free water daily    Recommend starting at 15mL/hr and advancing by 10mL q 8 hours.   150 mL Q4H fluid flushes for tube patency. Additional fluids and/or adjustments per MD.    Multivitamin/minerals to help ensure micronutrient needs being met with suspected hypermetabolic demands and potential interruptions to TF infusions.   100 mg Thiamine x 5-7 days to prevent lyte depletion d/t at risk for refeeding syndrome (already ordered by MD)    RELIZORB CARTRIDGES  *Change 1 cartridge every 24 hours with TF rate @ 10-20 ml/hr  *Change 1 cartridge every 12 hours with TF rate >20 ml/hr  *Supplies: Obtain cartridges-- call [phone] and provide peoplesoft #[phone]     Future/Additional Recommendations:  Monitor for PEJ replacement & ability to restart feeds.       REASON FOR ASSESSMENT  Chantell Kidd is a/an 59 year old female assessed by the dietitian for Provider Order - Restart of TF when G/J tube replaced.    Patient admitted for PEJ replacement (fell out around 7/27).     MEDICAL HISTORY  insulin-dependent DM following TPAIT (1/2012), migraine, hypothyroidism, adrenal insufficiency, and RNY with PEJ tube     NUTRITION HISTORY  PEJ dependent PTA. [employer] Peptide 1.5 (or equivalent) @ 45 mL/hr (1080 mL/day) to provide 1661 kcal, 80 g protein, 149 g CHO, 16 g fiber, 83 g fat (40% from MCTs), and 756 mL free water daily.     PEJ fell out 7/27. Since then has been eating broths, soups, crackers, boost or ensure original shakes  or Lovelog[employer] shakes (1/2 at at time, tries to finish 1 in a day; provides about 220kcal and 9g pro). Tries to eat about 3 times daily. Cannot tolerate a lot of nutrition at once d/t nomi-en-y surgery.     ** pt notes she has an intolerance to chocolate **    CURRENT NUTRITION ORDERS  Diet: NPO for procedure-- trying for PEJ replacement around 1730 today (8/1)    Intake/Tolerance: No documented intakes to assess.    LABS  K 2.9 (L)  BUN 3.4 (L)  Creat 0.3 (L)    MEDICATIONS  Novolog   NS @ 100mL/hr     PTA-- Creon, famotidine, protonix, Linzess, reglan and sucralfate     ANTHROPOMETRICS  Height: 0 cm (Data Unavailable)  Most Recent Weight: 46 kg (101 lb 6.6 oz)    IBW: 50 kg   Body mass index is 18.55 kg/m . BMI Category: Underweight BMI <18.5  Weight History: trending wt loss not clinically relevant   Wt Readings from Last 20 Encounters:   08/01/25 46 kg (101 lb 6.6 oz)   06/18/25 46.3 kg (102 lb)   06/09/25 45.6 kg (100 lb 8 oz)   05/18/25 47.8 kg (105 lb 6.1 oz)   05/13/25 48.5 kg (107 lb)   05/06/25 46.6 kg (102 lb 11.2 oz)   04/29/25 47.2 kg (104 lb)   04/21/25 50.4 kg (111 lb 1.6 oz)   05/06/25 46.6 kg (102 lb 11.2 oz)   04/03/25 42.8 kg (94 lb 6.4 oz)   01/23/25 45.8 kg (101 lb)   04/04/23 59.2 kg (130 lb 8 oz)   03/08/23 60.3 kg (133 lb)   03/02/23 59.6 kg (131 lb 6.4 oz)   02/10/23 59.2 kg (130 lb 8 oz)   02/07/23 58.8 kg (129 lb 11.2 oz)   11/04/22 57.1 kg (125 lb 12.8 oz)   09/26/22 52.6 kg (116 lb)   04/01/21 66.9 kg (147 lb 8 oz)   11/08/20 67.5 kg (148 lb 13 oz)       ASSESSED NUTRITION NEEDS  Dosing Weight: 46 kg (Admission weight)   Estimated Energy Needs: 1380- 1650 kcals/day (30 - 35 kcals/kg )  Justification: Underweight  Estimated Protein Needs: 55- 69 grams protein/day (1.2 - 1.5 grams of pro/kg)  Justification: Underweight  Estimated Fluid Needs: 0857-2748 mL/day (25 - 30 mL/kg)   Justification: Maintenance    PHYSICAL FINDINGS  See malnutrition section below.    Per EMR:        MALNUTRITION  %  "Intake: </= 50% for >/= 5 days (severe)  % Weight Loss: Weight loss does not meet criteria for malnutrition   Subcutaneous Fat Loss: Facial region:  moderate, Upper arm:  moderate, Lower arm:  moderate, and Thoracic/intercostal:  moderate   Muscle Loss: Temporal:  severe, Facial & jaw region:  severe, Thoracic region (clavicle, acromium bone, deltoid, trapezius, pectoral):  severe, Upper arm (bicep, tricep):  severe, and Lower arm  (forearm):  severe  Fluid Accumulation/Edema: None noted  Malnutrition Diagnosis: Severe malnutrition in the context of chronic illness/disease    NUTRITION DIAGNOSIS  Inadequate protein-energy intake related to chronic low appetite and altered GI function as evidenced by hx of nomi-en-y, poor intakes, TPAIT (2012), long term EN need.       INTERVENTIONS  Implementation  Enteral Nutrition - Initiate post-op    Goals  Total avg nutritional intake to meet a minimum of 30 kcal/kg and 1.2 g PRO/kg daily (per dosing wt 46 kg).     Monitoring/Evaluation  Progress toward goals will be monitored and evaluated per protocol.    Nila Pastrana, MS, RDN, LD  6C, 7C, ED, Obs RD  Vocera - \"ED Clinical Dietitian\"  Weekend/Holiday RD - \"Weekend Clinical Dietitian\"     "

## 2025-08-01 NOTE — MEDICATION SCRIBE - ADMISSION MEDICATION HISTORY
Medication Scribe Admission Medication History    Admission medication history is complete. The information provided in this note is only as accurate as the sources available at the time of the update.    Information Source(s): Patient, Hospital records, CareEverywhere/SureScripts, and Dispensary Report via in-person    Pertinent Information: Spoke with patient in person and patient states that she went over her medication hx with staff at her last hospital visit. Per hospital records patient was seen at [facility] at 07/03/25 same day admission and discharge. Patient was also seen at [facility] [place] on 07/26/25 and medication list was updated during that visit as well.   Deleted Lidocaine patches as patient states that she feels like they do not work for her.   Patient denied taking any antibiotics.   Patient does not have Dexcom G7 Sensor placed. Patient verified insulin, Dexcom, and Omnipod.     Changes made to PTA medication list:  Added: None  Deleted: Lidocaine 4% Patch  Changed: None    Allergies reviewed with patient and updates made in EHR: no    Medication History Completed By: Silvina Lynne 8/1/2025 2:02 PM    PTA Med List   Medication Sig Last Dose/Taking    acetaminophen (TYLENOL) 325 MG tablet Take 3 tablets (975 mg) by mouth every 8 hours Unknown    Acetone, Urine, Test (KETONE TEST) STRP 1 each by In Vitro route as needed (hyperglycemia) Unknown    Alcohol Swabs PADS Use to swab the area of the injection or tiago as directed Per insurance coverage Unknown    alendronate (FOSAMAX) 70 MG tablet Take 1 tablet (70 mg) by mouth every 7 days On Sundays take first thing in the morning with plain water and remain upright for at least 30 minutes and until after first food of the day    Do not restart Fosamax (alendronate) until your difficulty swallowing has resolved and you have finished the entire course of fluconazole (Diflucan). Unknown    alum & mag  hydroxide-simethicone (MYLANTA/MAALOX) 200-200-25 MG CHEW chewable tablet Take 1 tablet by mouth 3 times daily as needed for indigestion Unknown    Continuous Glucose  (DEXCOM G7 ) RICARDO Use to read blood sugars as per 's instructions. Unknown    Continuous Glucose Sensor (DEXCOM G7 SENSOR) MISC Change every 10 days. Unknown    CONTOUR NEXT TEST test strip USE TO TEST BLOOD SUGAR 6 TIMES DAILY OR AS DIRECTED. Call clinic to schedule follow up appointment.  IMPORTANT Unknown    cyclobenzaprine (FLEXERIL) 10 MG tablet Take 1 tablet (10 mg) by mouth or Feeding Tube 3 times daily as needed for muscle spasms. Unknown    diclofenac (VOLTAREN) 1 % topical gel Apply 2 g topically 4 times daily as needed for moderate pain. Unknown    Digestive Enzyme Cartridge (RELIZORB) Device Place 1 each (1 Device) into OG Tube 2 times daily. Administer as 1 cartridge every 12 hours Unknown    droNABinol (MARINOL) 5 MG capsule Take 1 capsule (5 mg) by mouth 2 times daily (before meals). Unknown    DULoxetine (CYMBALTA) 30 MG capsule Take 1 capsule (30 mg) by mouth daily With 60mg capsule for total dose of 90mg Unknown    DULoxetine (CYMBALTA) 60 MG EC capsule Take 1 capsule (60 mg) by mouth daily With 30mg capsule for total dose of 90mg Unknown    famotidine (PEPCID) 20 MG tablet Take 1 tablet (20 mg) by mouth At Bedtime Unknown    furosemide (LASIX) 40 MG tablet Take 1 tablet (40 mg) by mouth daily. Unknown    gabapentin (NEURONTIN) 400 MG capsule Take 1 tablet (400mg) twice a day and then an additional 2 tablets (800mg) at bedtime. Unknown    Glucagon (GVOKE HYPOPEN 2-PACK) 1 MG/0.2ML pen Inject the contents of 1 device under the skin into lower abdomen, outer thigh, or outer upper arm as needed for hypoglycemia. If no response after 15 minutes, additional 1 mg dose from a new device may be injected while waiting for emergency assistance. Unknown    glycerin (ADULT) 2 g suppository Place 1 suppository rectally  daily as needed for constipation. Unknown    hydrocortisone (CORTEF) 10 MG tablet Take 10 mg in the morning and 10 mg along with a 5 mg tablet at bedtime for a total dose of 15 mg at bedtime. Watch for hypoglycemia recurrence. Unknown    hydrocortisone (CORTEF) 5 MG tablet Take 5 mg by mouth At Bedtime along with a 10 mg tablet for a total dose of 15 mg Unknown    hydrocortisone sodium succinate PF (SOLU-CORTEF) 100 MG injection Inject 100mg (1 mL) into muscle in emergency or unable to take oral hydrocortisone. Go to emergency room if given. ActoVial NDC [address]-78. Note to pharmacy: Provide two 3ml syringes with 23g 1 inch needles. Unknown    Injection Device for insulin (NOVOPEN ECHO) RICARDO Use with   Insulin Unknown    insulin aspart (NOVOLOG PEN) 100 UNIT/ML pen Continue Novolog Meal Coverage: 1 unit per 12 grams CHO, TID AC and 1:15 PRN with snacks/supplements - Continue Novolog Correction Scale: 1:75>150 TID AC, HS, 0200 at 1400 Unknown    insulin aspart (NOVOLOG PEN) 100 UNIT/ML pen -novolog insulin: 1 unit for every 12 grams of carbs with meals and snacks plus correction insulin per scale below: Meal time sliding scale insulin:If -199 give 1 unit If -249 give 2 units If -299 give 3 units If -349 give 4 units If BG greater than 350 give 5 units, Bedtime sliding scale insulin: If -249 give 1 unit If -299 give 2 units If -349 give 3 units If BG greater than 350 give 4 units Unknown    Insulin Disposable Pump (OMNIPOD 5 G6 INTRO, GEN 5,) KIT 1 each See Admin Instructions Use continuously to infuse insulin. Unknown    insulin glargine (LANTUS PEN) 100 UNIT/ML pen Inject 10 Units subcutaneously every 24 hours. Unknown    insulin  UNIT/ML injection Inject 10 Units subcutaneously 2 times daily. Unknown    insulin pen needle (PIP PEN NEEDLES 32G X 4MM) 32G X 4 MM miscellaneous Use 6 pen needles daily or as directed. Unknown    [employer] 1.5 Peptide Plain 325 mL  Place 1,080 mLs into J tube daily. Infuse Sagrario Marinelli Peptide 1.5 @ 45 ml/hr into J-tube via pump  Water flush: 120 ml tid + an additional 550 ml through flushes or oral intake  Kcals: 1662  Cartons per day: 3.5 Unknown    levothyroxine (SYNTHROID/LEVOTHROID) 125 MCG tablet Take 1 tablet (125 mcg) by mouth every morning (before breakfast). Unknown    linaclotide (LINZESS) 145 MCG capsule Take 1 capsule (145 mcg) by mouth every morning (before breakfast). as needed Unknown    lipase-protease-amylase (CREON 12) 88876-729[address]-94453 units CPEP Take 8 capsules by mouth 3 times daily (with meals). 6 capsules with snacks Unknown    metoclopramide (REGLAN) 5 MG tablet Take 2 tablets (10 mg) by mouth 3 times daily. Unknown    naloxone (NARCAN) 4 MG/0.1ML nasal spray Spray 1 spray (4 mg) into one nostril alternating nostrils as needed for opioid reversal. every 2-3 minutes until assistance arrives Unknown    Nutritional Supplements (BOOST HIGH PROTEIN) LIQD After above baseline labs are drawn, give: 6 mL/kg to maximum of 360 mL; the beverage is to be consumed within 5 minutes. Unknown    ondansetron (ZOFRAN) 4 MG tablet Take 1 tablet (4 mg) by mouth every 8 hours as needed for nausea. Unknown    oxyCODONE (ROXICODONE) 5 MG tablet Take 1-2 tablets (5-10 mg) by mouth every 6 hours as needed for pain. Take the lowest possible dose to control your pain, and use non-opioid pain options first. Decrease the number of pills you take each day after you discharge until you are able to stop completely. Unknown    pantoprazole (PROTONIX) 40 MG EC tablet Take 1 tablet (40 mg) by mouth 2 times daily. Unknown    polyethylene glycol (MIRALAX) 17 GM/Dose powder Take 17 g by mouth 3 times daily. Unknown    potassium chloride ER (KLOR-CON M) 20 MEQ CR tablet Take 1 tablet (20 mEq) by mouth daily Unknown    senna-docusate (SENOKOT-S/PERICOLACE) 8.6-50 MG tablet Take 2 tablets by mouth 2 times daily. Unknown    Sharps Container MISC Use as directed  to dispose of needles, lancets and other sharps Unknown    simethicone (MYLICON) 80 MG chewable tablet Take 1 tablet (80 mg) by mouth 4 times daily. Unknown    sodium chloride (OCEAN) 0.65 % nasal spray Spray 1 spray into both nostrils daily as needed for congestion Unknown    sodium chloride, PF, 0.9% PF flush Inject 10-40 mLs into catheter every 8 hours. -- Flush J port and G port EACH with 30 ml water every 8 hours -- Flush J port with 30 ml water BEFORE AND AFTER medications to avoid clogging of this tube. Unknown    sucralfate (CARAFATE) 1 GM tablet Take 1 tablet (1 g) by mouth 4 times daily. Unknown    SUMAtriptan (IMITREX) 50 MG tablet Take 1 tablet (50 mg) by mouth at onset of headache for migraine Take 1 Tab by mouth Once as needed for Migraine Headache. May repeat after two hours.  Maximum dose 200 mg/24 hours. Unknown    thiamine (B-1) 100 MG tablet Place 1 tablet (100 mg) into Feeding Tube daily. Unknown    topiramate (TOPAMAX) 100 MG tablet Take 1 tablet (100 mg) by mouth 2 times daily. Unknown    traZODone (DESYREL) 150 MG tablet Take 1 tablet (150 mg) by mouth at bedtime. Unknown

## 2025-08-01 NOTE — CONSULTS
"  Gastroenterology Consultation      Date of Admission:  7/31/2025  Reason for Admission: Hyperglycemia  Date of Consult  8/1/2025   Requesting Physician:  Mathew Kingston MD           ASSESSMENT AND RECOMMENDATIONS:   Assessment:  Chantell Kidd is a 59 year old female with PMH including migraines, hypothyroidism, adrenal insufficiency, s/p TPAIT (1/2012) with poorly controlled DM (A1C 15.8% on 4/30/25) with suspected gastroparesis, chronic pain and OUD, chronic constipation and chronic malnutrition requiring percutaneous feeding tube (initially placed GJ tube with TPAIT in 2012, removed but replaced Gtube in 10/2026 with eventual conversation to GJ in 12/2016 all by IR and, given multiple dislodged GJ tubes request from surgery to [facility] GI team to converted previous GJ to PEG + PEJ which was completed on 5/2/25).  Now presents to the ED on 7/31 after her Jtube fell out 4 days PTA.    # S/p TPAIT with poorly controlled DM (type 1) and gastroparesis  # S/p PEG + PEJ (5/2/25) - PEJ inadvertently fell out, now with closed tract  # Hypokalemia  Presents with inadvertent removal of PEJ (\"just fell out\").  Noted patient had Jtube replaced at OSH on 7/3 and and again to balloon Jtube on 7/7 by IR (upsized from previous 14 Fr to 16 Fr due to reported leaking around the tube).  Patient claims was not aware needed to come in to have tube placed across the tract to make sure didn't close. Patient did keep previous 16 Fr tube that fell out for providers to examine - upon this writer's interrogation of balloon only with 2 mL saline (compared to previously reported 5 mL instilled at OSH on 7/7/25) but did not appreciate any overt liquid leaking from balloon.      IR attempted to replace tube across previous existing tract 7/31 but unable to re-access left upper quadrant jejunostomy stoma as healed closed.  IR noted a patent channel between the LLQ ostomy and  the perineum, raising concern for source of peritonitis. Subsequent CT AP " obtained but no fluid collections or significant inflammatory stranding observed along the tract.    -Plan for EGD for replacement of new PEJ today (8/1) - timing TBD as add-on status (anticipate not until later this afternoon/evening).  -Recheck K after replacement completed per protocols, repeat as indicated.  -Hold restarting any AC (as will need new stick with above procedure).  -Continue NPO status.  -Gtube to gravity if c/o N/V (ordered for you).  -Analgesia/Antiemetics per primary team.  -Glycemic management per Endocrinology.  -Education provided to patient that if/when any of her percutaneous tubes fall out, to present to ED for tube replacement across the tract to avoid having to make new incision/place new tube.    # Severe protein-calorie malnutrition, TF-dependent (r/t above)  # Hypokalemia  Reports weight has been stable on PTA TF regimen (but no regains, BMI 18.5) but thin/underweight female with e/o muscle/fat wasting (see exam below).  Additionally, low BUN 3 likely suggestive of inadequate PRO intakes.    -RD consulted to resume TF once able to replace tube.  -High electrolyte replacement protocol and monitor for refeeding sx once TF restart/adv.  -Diet as tolerates (only once post above procedure).    # Acute on chronic constipation  # Chronic opioid use disorder  Noted to have large stool burden throughout colon on above CT.  Follows with [facility] (JUNO Feldman) for this and on Linzess and laxatives and reglan.  Considering possible trial of motegrity given chronic pain/OUD (oxycodone) if above bowel med regimen not effective.  Not currently on opioid antagonist.  Has colonoscopy scheduled in 9/2025 given elevated CEA.    -Continue PTA Linzess and laxatives.  -Perform bowel clean out while admitted to prevent further exacerbation of constipation with the following:   --8/1: Mineral oil enema q 8 hrs x2-3    --8/2: AXR in AM to re-evaluate stool burden.  --If ongoing significant stool  burden, begin 2 L GoLytely (drink 12 ounces every 15 minutes until the solution is gone OR if unable to tolerate this infuse via new PEJ at whatever rate patient can tolerate until 2 L infused).  --Consider methylnaltrexone 12 mg subcutaneous daily while patient remains on opioids.   If deemed opioid antagonist helpful with constipation, consider transition to Naloxegol 25 mg PO once a day in the morning to continue at discharge to see if helps with possible opioid induced constipation.  -Proceed with outpatient colonoscopy as scheduled.      Discussed with primary medicine team (SANTOS Ricardo), Endocrine (NP Mahsa Jose who was at bedside), RD (also at bedside) and patient.    Gastroenterology follow up recommendations:   -Colonoscopy as scheduled 9/15  -Follow up with General GI (post colonoscopy) for ongoing management of chronic constipation.      Thank you for involving us in this patient's care. Please do not hesitate to contact the GI service with any questions or concerns.     Pt seen and care plan discussed with Dr. Gruber, GI staff physician.    Overall time spent on the date of this encounter preparing to see the patient (including chart review of available notes, clinical status events, imaging and labs); obtaining and/or reviewing separately obtained history; ordering medications, tests or procedures; communicating with other health care professionals; and documenting the above clinical information in the electronic medical record was 90 minutes.      Margot Holman PA-C, TITI, [facility]  Inpatient Gastroenterology Service  [facility]  Pager: *[phone]  Text Page     -------------------------------------------------------------------------------------------------------------------       Reason for Consultation:   gi tube displacement            History of Present Illness:   Chantell Kidd is a 59 year old female with PMH including migraines, hypothyroidism, adrenal insufficiency,  "s/p TPAIT (1/2012) with poorly controlled DM (A1C 15.8% on 4/30/25) with suspected gastroparesis, chronic pain and OUD, chronic constipation and chronic malnutrition requiring percutaneous feeding tube (initially placed GJ tube with TPAIT in 2012, removed but replaced Gtube in 10/2026 with eventual conversation to GJ in 12/2016 all by IR and, given multiple dislodged GJ tubes request from surgery to [facility] team to converted previous GJ to PEG + PEJ which was completed on 5/2/25).  Now presents to the ED on 7/31 after her Jtube fell out 4 days PTA.    Patient seen and examined at 10:30. History is obtained from patient who reported that 4 days ago her Jtube \"just fell out while doing the dishes\" (no trauma/pulling on tube).  Saved the tube and noted balloon still to be inflated somewhat even after fell out.  Denied having significant drainage around the tube site since it was last replaced and up-sized (at OSH on 7/7).  Did not attempt to replace the tube across the tract and did not know she needed to come in after fell out to help ensure the tract remained open/did not close.      Previous Procedures:  7/31/25: IR Jejunostomy Tube Change (07/31/2025 4:30 PM)   Pre-procedure diagnosis: Left lower quadrant jejunostomy fell out.  Post-procedure diagnosis: Same  Procedure urgency: Urgent  Indication: Jejunostomy fell 4 days ago.  Additional clinical history:  16 fr J-tube replacement (fell out 4  days ago, nothing has been within the tract).      Complications: No immediate complications.    IMPRESSION:  Unable to re-access left upper quadrant jejunostomy stoma.     Plan:   - Recommend surgery consult for surgical options for regaining  jejunostomy access.  - There is a patent channel between the left lower quadrant ostomy and  the perineum, raising concern for source of peritonitis.    7/7/25: IR - Jejunostomy tube replacement with fluoroscopy (at [facility])  IR NG Tube Placement Req Rad & Fluoro " (07/07/2025 2:10 PM)   IMPRESSION:   1. Successful exchange and upsize of a 14 Malagasy percutaneous jejunostomy tube, for a 16 Malagasy percutaneous jejunostomy tube.   2. The leakage was likely due to a combination of a loose external disc, and a slightly overinflated retention balloon at 5 cc.     7/3/25: IR - Jtube replacement (at [facility])  IR NG Tube Placement Req Rad & Fluoro (07/03/2025 5:40 PM)   HISTORY: Jejunostomy tube fell out. History of gastric bypass. Has concomitant G-tube.   FINDINGS:   The left upper quadrant and jejunostomy tube site were prepped and draped in the usual sterile fashion. 1% lidocaine was used for local anesthesia. Combination, catheter and Glidewire used to catheterize the jejunum. Jejunal tube replaced, however upon further contrast opacification this appear to be within the stomach. Jejunostomy tube removed. Kumpe catheter and Glidewire redirected into the jejunum. Jejunostomy tube cut to half length. Jejunostomy tube positioned over wire in the jejunum and injected to confirm position. Due to surgical anatomy, patient may need repositioning of the jejunostomy tube at later date if it does not migrate or is not already in the correct position. Details discussed with patient who expressed understanding and is in agreement.     EXAM: IR JEJUNAL TUBE CHECK/CHANGE   1. Jejunostomy tube replacement     5/2/25:  SMALL BOWEL ENTEROSCOPY (05/02/2025 8:36 AM) with Dr. Perdomo  Impression:   - TPIAT anatomy with overt evidence of gastroparesis                          (solid content throughour the stomach)                          - Unremarkable duodenjejunostomy                          - Uncomplicated placement of a 14F jejunostomy (bumper) tube to                          the proximal jejunum several cm beyond the pylorus                          (deeper windows were not available)                          - Uncomplicated exchange of malpositioned GJ To an 18F                           gastrostomy tube across a mature tract           Past Medical History:   Reviewed and edited as appropriate  Past Medical History:   Diagnosis Date    Bone and joint disord back, pelvis, leg complicat preg, childb, puerp 2010    Chronic abdominal pain     Chronic headache     Chronic pancreatitis (H)     S/P pancreatectomy    Depression with anxiety     Earache or other ear, nose, or throat complaint 2006    Fracture 2003    Gallbladder problem 2004    Gastro-oesophageal reflux disease     Gastroparesis 05/13/2025    Hearing problem 2008    Hypothyroidism 04/23/2015    Kidney stones     Low serum cortisol level     Migraines     Other bladder disorder 2000    Other chronic pain     STOMACH    Other chronic pain     LUMBAR SPINE    Peripheral neuropathy     Pneumonia 1980    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    TPIAT    Spasm of sphincter of Oddi     Stomach problems 2000    Transplant Islet Transplant    Uncomplicated asthma 1980    Urinary tract infection 2000    Vision disorder 2000            Past Surgical History:   Reviewed and edited as appropriate   Past Surgical History:   Procedure Laterality Date    ARTHROPLASTY CARPOMETACARPAL (THUMB JOINT)  05/02/2014    Procedure: ARTHROPLASTY CARPOMETACARPAL (THUMB JOINT);  Surgeon: Carina Panda MD;  Location:  OR    BACK SURGERY  1/6/2012    removed during Transplant    BIOPSY      BREAST SURGERY  05/2015    CHOLECYSTECTOMY  01/01/2004    COLONOSCOPY  07/18/2014    Procedure: COLONOSCOPY;  Surgeon: Aurora Sahu MD;  [facility]    COLONOSCOPY N/A 08/01/2017    Procedure: COLONOSCOPY;  Colonoscopy and upper endoscopy;  Surgeon: Deirdre Harris MD;  [facility]: [facility]    ENDOSCOPIC INSERTION TUBE JEJUNOSTOMY N/A 05/02/2025    Procedure: Esophagogastroduodenoscopy, Jejunopexy, JEJUNOSTOMY TUBE PLACEMENT, GASTROSTOMY TUBE EXCHANGE;  Surgeon: Jose Francisco Perdomo MD;  [facility]    ENDOSCOPIC RETROGRADE  CHOLANGIOPANCREATOGRAM      ENDOSCOPIC RETROGRADE CHOLANGIOPANCREATOGRAM  04/19/2011    Procedure:ENDOSCOPIC RETROGRADE CHOLANGIOPANCREATOGRAM; Pancreatic Stent Placement      ENDOSCOPIC RETROGRADE CHOLANGIOPANCREATOGRAM  05/26/2011    Procedure:ENDOSCOPIC RETROGRADE CHOLANGIOPANCREATOGRAM; with Pancreatic Stent Removal; Surgeon:DALE MIMS; [facility]    ENDOSCOPY UPPER, COLONOSCOPY, COMBINED  04/25/2012    Procedure:COMBINED ENDOSCOPY UPPER, COLONOSCOPY; Enteroscopy with Bile Duct Stent Removal, Colonoscopy  *Latex Safe Room*; Surgeon:GRACY GODWINIQ; [facility]    ESOPHAGOSCOPY, GASTROSCOPY, DUODENOSCOPY (EGD), COMBINED  05/26/2011    Procedure:COMBINED ESOPHAGOSCOPY, GASTROSCOPY, DUODENOSCOPY (EGD); Surgeon:DALE MIMS; [facility]    ESOPHAGOSCOPY, GASTROSCOPY, DUODENOSCOPY (EGD), COMBINED N/A 10/30/2014    Procedure: COMBINED ESOPHAGOSCOPY, GASTROSCOPY, DUODENOSCOPY (EGD), BIOPSY SINGLE OR MULTIPLE;  Surgeon: Sarai Moon MD;  [facility]    ESOPHAGOSCOPY, GASTROSCOPY, DUODENOSCOPY (EGD), COMBINED Left 07/06/2015    Procedure: COMBINED ESOPHAGOSCOPY, GASTROSCOPY, DUODENOSCOPY (EGD), BIOPSY SINGLE OR MULTIPLE;  Surgeon: Thomas Estrada MD;  [facility]    ESOPHAGOSCOPY, GASTROSCOPY, DUODENOSCOPY (EGD), COMBINED N/A 07/08/2016    Procedure: COMBINED ESOPHAGOSCOPY, GASTROSCOPY, DUODENOSCOPY (EGD), BIOPSY SINGLE OR MULTIPLE;  Surgeon: Eloy Klein MD;  [facility]    ESOPHAGOSCOPY, GASTROSCOPY, DUODENOSCOPY (EGD), COMBINED N/A 08/04/2016    Procedure: COMBINED ESOPHAGOSCOPY, GASTROSCOPY, DUODENOSCOPY (EGD), BIOPSY SINGLE OR MULTIPLE;  Surgeon: Jason Brown MD;  [facility]    ESOPHAGOSCOPY, GASTROSCOPY, DUODENOSCOPY (EGD), COMBINED N/A 08/01/2017    Procedure: COMBINED ESOPHAGOSCOPY, GASTROSCOPY, DUODENOSCOPY (EGD);;  Surgeon: Deirdre Harris MD;  [facility]    ESOPHAGOSCOPY, GASTROSCOPY, DUODENOSCOPY  (EGD), COMBINED N/A 06/12/2019    Procedure: ESOPHAGOGASTRODUODENOSCOPY (EGD);  Surgeon: Jose Francisco Perdomo MD;  [facility]    GYN SURGERY      Hysterectomy and USO    [facility] ENDOSCOPY W EUS  07/20/2011    Procedure:COMBINED ENDOSCOPIC ULTRASOUND, ESOPHAGOSCOPY, GASTROSCOPY, DUODENOSCOPY (EGD); Surgeon:DARVIN DONOHUE; [facility]    HERNIORRHAPHY VENTRAL N/A 09/15/2016    Procedure: HERNIORRHAPHY VENTRAL;  Surgeon: Juanita Bernabe MD;  [facility]    HYSTERECTOMY  1997 or 1998    USO    INCISION AND DRAINAGE ABDOMEN WASHOUT, COMBINED  08/16/2012    Procedure: COMBINED INCISION AND DRAINAGE ABDOMEN WASHOUT;  ,debridement and Drainage Post Appendectomy;  Surgeon: Ron Austin MD;  [facility]    INJECT TRANSVERSUS ABDOMINIS PLANE (TAP) BLOCK BILATERAL Bilateral 05/26/2016    Procedure: INJECT TRANSVERSUS ABDOMINIS PLANE (TAP) BLOCK BILATERAL;  Surgeon: Leonard Mccallum MD;  [facility]    IR JEJUNOSTOMY TUBE CHANGE  7/31/2025    IR NG TUBE PLACEMENT REQ RAD & FLUORO  12/04/2017    IR NG TUBE PLACEMENT REQ RAD & FLUORO  07/07/2025    IR NG TUBE PLACEMENT REQ RAD & FLUORO  07/03/2025    LAPAROSCOPIC APPENDECTOMY  07/30/2012    Procedure: LAPAROSCOPIC APPENDECTOMY;  Open Appendectomy;  Surgeon: Ron Austin MD;  [facility]    PANCREATECTOMY, TRANSPLANT AUTO ISLET CELL, COMBINED  01/06/2012    Procedure:COMBINED PANCREATECTOMY, TRANSPLANT AUTO ISLET CELL; Total  Pancreatectomy, Auto Islet Transplant, splenectomy, 18fr. transgastric-jejunal feeding tube placement, liver biopsy; Surgeon:PALAK LEE; [facility]    PICC DOUBLE LUMEN PLACEMENT Right 04/19/2025    5FR DL PICC, brachial medial vein. L-38cm, 3cm out.    PICC INSERTION - DOUBLE LUMEN Right 04/30/2025    39-3cm, Medial brachial vein    RI STOMACH SURGERY PROCEDURE UNLISTED      REPLACE GASTROSTOMY TUBE, PERCUTANEOUS N/A 08/30/2017    Procedure: REPLACE GASTROSTOMY TUBE, PERCUTANEOUS;  GJ Tube  Change;  Surgeon: Jose Nath PA-C;  [facility]    SOFT TISSUE SURGERY  05/0412    SPLENECTOMY      TRANSPLANT  1/6/2012    auto Islet transplant          Social History:   .  Lives in [place], [place].         Family History:   Patient's family history is reviewed today and is non-contributory    Family History   Problem Relation Age of Onset    Hypertension Mother     Diabetes Mother     Osteoporosis Mother     Cerebrovascular Disease Mother     Breast Cancer Mother     Depression Mother     Anxiety Disorder Mother     Heart Disease Mother     Cancer Mother     Arthritis Mother     Cancer Father         pancreatic cancer    Other Cancer Father     Asthma Father     Thyroid Disease Father     Diabetes Maternal Grandmother     Cardiovascular Maternal Grandmother     Coronary Artery Disease Maternal Grandmother     Hypertension Maternal Grandmother     Cerebrovascular Disease Maternal Grandmother     Breast Cancer Maternal Grandmother     Depression Maternal Grandmother     Osteoporosis Maternal Grandmother     Obesity Maternal Grandmother     Heart Disease Maternal Grandmother     Allergies Maternal Grandmother     Cancer Maternal Grandfather         lung cancer    Cancer Sister         brain    Hypertension Sister     Cancer Sister         liver cancer    Alcohol/Drug Paternal Grandfather     Alzheimer Disease Paternal Grandfather     Hypertension Sister     Depression Sister     Anxiety Disorder Sister     Asthma Sister     Cancer Sister     Alcohol/Drug Sister     Asthma Son     Musculoskeletal Disorder Other     Depression Maternal Half-Sister     Anxiety Disorder Maternal Half-Sister     Mental Illness Maternal Half-Sister     Substance Abuse Maternal Half-Sister     Anesthesia Reaction Maternal Half-Sister     Asthma Maternal Half-Sister     Asthma Son     Asthma Maternal Half-Sister              Allergies:   Reviewed and edited as appropriate     Allergies   Allergen Reactions     Corticosteroids Other (See Comments)     All oral, IV and injectable steroids are contraindicated (unless in life threatening situations) in Islet Auto transplant recipients. They can cause irreversible loss of islet cell function. Please contact patient's transplant care coordinator YURI Cortez RN at [phone]/pager [phone] and/or endocrinologist prior to administration.      Chocolate Flavoring Agent (Non-Screening) Rash     Breaks out when eats chocolate            Medications:     Current Facility-Administered Medications   Medication Dose Route Frequency Provider Last Rate Last Admin    acetaminophen (TYLENOL) tablet 650 mg  650 mg Oral Q8H PRN Lilliam Venegas PA-C   650 mg at 07/31/25 2044    Or    acetaminophen (TYLENOL) Suppository 650 mg  650 mg Rectal Q8H PRN Lilliam Venegas PA-C        cyclobenzaprine (FLEXERIL) tablet 10 mg  10 mg Oral or Feeding Tube TID PRN Lilliam Venegas PA-C        glucose gel 15-30 g  15-30 g Oral Q15 Min PRN Lilliam Venegas PA-C        Or    dextrose 50 % injection 25-50 mL  25-50 mL Intravenous Q15 Min PRN Lilliam Venegas PA-C        Or    glucagon injection 1 mg  1 mg Subcutaneous Q15 Min PRN Lilliam Venegas PA-C        droNABinol (MARINOL) capsule 5 mg  5 mg Oral BID AC Lilliam Venegas PA-C        DULoxetine (CYMBALTA) DR capsule 30 mg  30 mg Oral Daily Lilliam Venegas PA-C        DULoxetine (CYMBALTA) DR capsule 60 mg  60 mg Oral Daily Lilliam Venegas PA-C        famotidine (PEPCID) tablet 20 mg  20 mg Oral At Bedtime Lilliam Venegas PA-C        [Held by provider] furosemide (LASIX) tablet 40 mg  40 mg Oral Daily Lilliam Venegas PA-C        gabapentin (NEURONTIN) capsule 400 mg  400 mg Oral BID Mathew Kingston MD        And    gabapentin (NEURONTIN) capsule 800 mg  800 mg Oral At Bedtime Mathew Kingston MD   800 mg at 07/31/25 5799    glycerin (ADULT) Suppository 1 suppository  1 suppository Rectal Daily  PRN Lilliam Venegas PA-C        hydrocortisone (CORTEF) tablet 10 mg  10 mg Oral QAM Lilliam Venegas PA-C        hydrocortisone (CORTEF) tablet 15 mg  15 mg Oral At Bedtime Lilliam Venegas PA-C        insulin aspart (NovoLOG) injection (RAPID ACTING)  1-7 Units Subcutaneous Q4H Mahsa Jose APRN CNP        insulin aspart (NovoLOG) injection (RAPID ACTING)   Subcutaneous With Snacks or Supplements Mahsa Jose APRN CNP        insulin aspart (NovoLOG) injection (RAPID ACTING)   Subcutaneous TID w/meals Mahsa Jose APRN CNP        [Held by provider] insulin aspart (NovoLOG) injection (RAPID ACTING)  1-5 Units Subcutaneous At Bedtime Lilliam Venegas PA-C   2 Units at 07/31/25 2300    insulin glargine (LANTUS PEN) injection 8 Units  8 Units Subcutaneous Q24H Lilliam Venegas PA-C        [Held by provider] insulin NPH injection 10 Units  10 Units Subcutaneous BID Lilliam Venegas PA-C        levothyroxine (SYNTHROID/LEVOTHROID) tablet 125 mcg  125 mcg Oral QAM AC Lilliam Venegas PA-C        lidocaine (PF) (XYLOCAINE) 1 % injection 1-30 mL  1-30 mL Subcutaneous Once PRN Drew Puri MD        linaclotide (LINZESS) capsule 145 mcg  145 mcg Oral QAM AC Lilliam Venegas PA-C        lipase-protease-amylase (CREON 12) 86144-55121-62840 units per capsule 8 capsule  8 capsule Oral TID w/meals Lilliam Venegas PA-C        metoclopramide (REGLAN) tablet 10 mg  10 mg Oral TID Lilliam Venegas PA-C   10 mg at 07/31/25 2044    ondansetron (ZOFRAN ODT) ODT tab 4 mg  4 mg Oral Q6H PRN Lilliam Venegas PA-C        Or    ondansetron (ZOFRAN) injection 4 mg  4 mg Intravenous Q6H PRN Lilliam Venegas PA-C        oxyCODONE (ROXICODONE) tablet 5 mg  5 mg Oral Q6H PRN Lilliam Venegas PA-C   5 mg at 08/01/25 0314    pantoprazole (PROTONIX) EC tablet 40 mg  40 mg Oral BID Lilliam Venegas PA-C   40 mg at 07/31/25 2044    polyethylene glycol (MIRALAX) Packet  17 g  17 g Oral BID Lilliam Venegas PA-C   17 g at 07/31/25 2250    prochlorperazine (COMPAZINE) injection 10 mg  10 mg Intravenous Q6H PRN Lilliam Venegas PA-C        Or    prochlorperazine (COMPAZINE) tablet 10 mg  10 mg Oral Q6H PRN Lilliam Venegas PA-C        senna-docusate (SENOKOT-S/PERICOLACE) 8.6-50 MG per tablet 1 tablet  1 tablet Oral BID PRN Lilliam Venegas PA-C        Or    senna-docusate (SENOKOT-S/PERICOLACE) 8.6-50 MG per tablet 2 tablet  2 tablet Oral BID PRN Lilliam Venegas PA-C        senna-docusate (SENOKOT-S/PERICOLACE) 8.6-50 MG per tablet 2 tablet  2 tablet Oral At Bedtime Lilliam Venegas PA-C   2 tablet at 07/31/25 2250    simethicone (MYLICON) chewable tablet 80 mg  80 mg Oral 4x Daily Lilliam Venegas PA-KEYANA        sodium chloride 0.9 % infusion   Intravenous Continuous Mathew Kingston  mL/hr at 08/01/25 0726 Rate Verify at 08/01/25 0726    sucralfate (CARAFATE) tablet 1 g  1 g Oral 4x Daily Lilliam Venegas PA-KEYANA        thiamine (B-1) tablet 100 mg  100 mg Per Feeding Tube Daily Lilliam Venegas PA-C        [Held by provider] topiramate (TOPAMAX) tablet 100 mg  100 mg Oral BID Lilliam Venegas PA-C        traZODone (DESYREL) tablet 150 mg  150 mg Oral At Bedtime Lilliam Venegas PA-C   150 mg at 07/31/25 2250     Current Outpatient Medications   Medication Sig Dispense Refill    acetaminophen (TYLENOL) 325 MG tablet Take 3 tablets (975 mg) by mouth every 8 hours      Acetone, Urine, Test (KETONE TEST) STRP 1 each by In Vitro route as needed (hyperglycemia) 50 strip 0    Alcohol Swabs PADS Use to swab the area of the injection or tiago as directed Per insurance coverage 200 each 1    alendronate (FOSAMAX) 70 MG tablet Take 1 tablet (70 mg) by mouth every 7 days On Sundays take first thing in the morning with plain water and remain upright for at least 30 minutes and until after first food of the day    Do not restart Fosamax  (alendronate) until your difficulty swallowing has resolved and you have finished the entire course of fluconazole (Diflucan). 4 tablet 0    alum & mag hydroxide-simethicone (MYLANTA/MAALOX) 200-200-25 MG CHEW chewable tablet Take 1 tablet by mouth 3 times daily as needed for indigestion      Continuous Glucose  (DEXCOM G7 ) RICARDO Use to read blood sugars as per 's instructions. 1 each 0    Continuous Glucose Sensor (DEXCOM G7 SENSOR) MISC Change every 10 days. 9 each 5    CONTOUR NEXT TEST test strip USE TO TEST BLOOD SUGAR 6 TIMES DAILY OR AS DIRECTED. Call clinic to schedule follow up appointment.  IMPORTANT 600 strip 1    cyclobenzaprine (FLEXERIL) 10 MG tablet Take 1 tablet (10 mg) by mouth or Feeding Tube 3 times daily as needed for muscle spasms. 60 tablet 0    diclofenac (VOLTAREN) 1 % topical gel Apply 2 g topically 4 times daily as needed for moderate pain. 50 g 0    Digestive Enzyme Cartridge (RELIZORB) Device Place 1 each (1 Device) into OG Tube 2 times daily. Administer as 1 cartridge every 12 hours 60 each 11    droNABinol (MARINOL) 5 MG capsule Take 1 capsule (5 mg) by mouth 2 times daily (before meals). 60 capsule 3    DULoxetine (CYMBALTA) 30 MG capsule Take 1 capsule (30 mg) by mouth daily With 60mg capsule for total dose of 90mg 90 capsule 0    DULoxetine (CYMBALTA) 60 MG EC capsule Take 1 capsule (60 mg) by mouth daily With 30mg capsule for total dose of 90mg 90 capsule 1    famotidine (PEPCID) 20 MG tablet Take 1 tablet (20 mg) by mouth At Bedtime 30 tablet 0    furosemide (LASIX) 40 MG tablet Take 1 tablet (40 mg) by mouth daily. 30 tablet 0    gabapentin (NEURONTIN) 400 MG capsule Take 1 tablet (400mg) twice a day and then an additional 2 tablets (800mg) at bedtime. 1080 capsule 3    Glucagon (GVOKE HYPOPEN 2-PACK) 1 MG/0.2ML pen Inject the contents of 1 device under the skin into lower abdomen, outer thigh, or outer upper arm as needed for hypoglycemia. If no  response after 15 minutes, additional 1 mg dose from a new device may be injected while waiting for emergency assistance. 0.4 mL 0    glycerin (ADULT) 2 g suppository Place 1 suppository rectally daily as needed for constipation.      hydrocortisone (CORTEF) 10 MG tablet Take 10 mg in the morning and 10 mg along with a 5 mg tablet at bedtime for a total dose of 15 mg at bedtime. Watch for hypoglycemia recurrence.      hydrocortisone (CORTEF) 5 MG tablet Take 5 mg by mouth At Bedtime along with a 10 mg tablet for a total dose of 15 mg      hydrocortisone sodium succinate PF (SOLU-CORTEF) 100 MG injection Inject 100mg (1 mL) into muscle in emergency or unable to take oral hydrocortisone. Go to emergency room if given. ActoVial NDC [address]-37. Note to pharmacy: Provide two 3ml syringes with 23g 1 inch needles. 1 mL 0    Injection Device for insulin (NOVOPEN ECHO) RICARDO Use with   Insulin 1 each 0    insulin aspart (NOVOLOG PEN) 100 UNIT/ML pen Continue Novolog Meal Coverage: 1 unit per 12 grams CHO, TID AC and 1:15 PRN with snacks/supplements - Continue Novolog Correction Scale: 1:75>150 TID AC, HS, 0200 at 1400 15 mL 11    insulin aspart (NOVOLOG PEN) 100 UNIT/ML pen -novolog insulin: 1 unit for every 12 grams of carbs with meals and snacks plus correction insulin per scale below: Meal time sliding scale insulin:If -199 give 1 unit If -249 give 2 units If -299 give 3 units If -349 give 4 units If BG greater than 350 give 5 units, Bedtime sliding scale insulin: If -249 give 1 unit If -299 give 2 units If -349 give 3 units If BG greater than 350 give 4 units 15 mL 0    Insulin Disposable Pump (OMNIPOD 5 G6 INTRO, GEN 5,) KIT 1 each See Admin Instructions Use continuously to infuse insulin. 1 kit 0    insulin glargine (LANTUS PEN) 100 UNIT/ML pen Inject 10 Units subcutaneously every 24 hours. 15 mL 0    insulin  UNIT/ML injection Inject 10 Units subcutaneously 2 times  daily. 15 mL 0    insulin pen needle (PIP PEN NEEDLES 32G X 4MM) 32G X 4 MM miscellaneous Use 6 pen needles daily or as directed. 200 each 5    [employer] 1.5 Peptide Plain 325 mL Place 1,080 mLs into J tube daily. Infuse [employer] Peptide 1.5 @ 45 ml/hr into J-tube via pump  Water flush: 120 ml tid + an additional 550 ml through flushes or oral intake  Kcals: 1662  Cartons per day: 3.5 46251 mL 11    levothyroxine (SYNTHROID/LEVOTHROID) 125 MCG tablet Take 1 tablet (125 mcg) by mouth every morning (before breakfast). 30 tablet 0    Lidocaine (LIDOCARE) 4 % Patch Place 1-2 patches over 12 hours onto the skin every 24 hours. To prevent lidocaine toxicity, patient should be patch free for 12 hrs daily. 20 patch 0    linaclotide (LINZESS) 145 MCG capsule Take 1 capsule (145 mcg) by mouth every morning (before breakfast). as needed 30 capsule 0    lipase-protease-amylase (CREON 12) 50908-60724-51273 units CPEP Take 8 capsules by mouth 3 times daily (with meals). 6 capsules with snacks      metoclopramide (REGLAN) 5 MG tablet Take 2 tablets (10 mg) by mouth 3 times daily. 90 tablet 0    naloxone (NARCAN) 4 MG/0.1ML nasal spray Spray 1 spray (4 mg) into one nostril alternating nostrils as needed for opioid reversal. every 2-3 minutes until assistance arrives 2 each 0    Nutritional Supplements (BOOST HIGH PROTEIN) LIQD After above baseline labs are drawn, give: 6 mL/kg to maximum of 360 mL; the beverage is to be consumed within 5 minutes.  0    ondansetron (ZOFRAN) 4 MG tablet Take 1 tablet (4 mg) by mouth every 8 hours as needed for nausea. 20 tablet 0    oxyCODONE (ROXICODONE) 5 MG tablet Take 1-2 tablets (5-10 mg) by mouth every 6 hours as needed for pain. Take the lowest possible dose to control your pain, and use non-opioid pain options first. Decrease the number of pills you take each day after you discharge until you are able to stop completely. 16 tablet 0    pantoprazole (PROTONIX) 40 MG EC tablet Take 1 tablet  (40 mg) by mouth 2 times daily. 60 tablet 0    polyethylene glycol (MIRALAX) 17 GM/Dose powder Take 17 g by mouth 3 times daily. 510 g 0    potassium chloride ER (KLOR-CON M) 20 MEQ CR tablet Take 1 tablet (20 mEq) by mouth daily 90 tablet 1    senna-docusate (SENOKOT-S/PERICOLACE) 8.6-50 MG tablet Take 2 tablets by mouth 2 times daily. 160 tablet 0    Sharps Container MISC Use as directed to dispose of needles, lancets and other sharps 1 each 1    simethicone (MYLICON) 80 MG chewable tablet Take 1 tablet (80 mg) by mouth 4 times daily. 160 tablet 0    sodium chloride (OCEAN) 0.65 % nasal spray Spray 1 spray into both nostrils daily as needed for congestion 30 mL 0    sodium chloride, PF, 0.9% PF flush Inject 10-40 mLs into catheter every 8 hours. -- Flush J port and G port EACH with 30 ml water every 8 hours -- Flush J port with 30 ml water BEFORE AND AFTER medications to avoid clogging of this tube. 500 mL 0    sucralfate (CARAFATE) 1 GM tablet Take 1 tablet (1 g) by mouth 4 times daily. 160 tablet 0    SUMAtriptan (IMITREX) 50 MG tablet Take 1 tablet (50 mg) by mouth at onset of headache for migraine Take 1 Tab by mouth Once as needed for Migraine Headache. May repeat after two hours.  Maximum dose 200 mg/24 hours. 30 tablet 1    thiamine (B-1) 100 MG tablet Place 1 tablet (100 mg) into Feeding Tube daily. 30 tablet 0    topiramate (TOPAMAX) 100 MG tablet Take 1 tablet (100 mg) by mouth 2 times daily. 50 tablet 0    traZODone (DESYREL) 150 MG tablet Take 1 tablet (150 mg) by mouth at bedtime. 90 tablet 3             Review of Systems:     A complete review of systems was performed and is negative except as noted in the HPI           Physical Exam:   Temp: 97.6  F (36.4  C) Temp src: Oral BP: 120/75 Pulse: 74   Resp: 17 SpO2: 96 % O2 Device: None (Room air)    Wt:   Wt Readings from Last 10 Encounters:   08/01/25 46 kg (101 lb 6.6 oz)   06/18/25 46.3 kg (102 lb)   06/09/25 45.6 kg (100 lb 8 oz)   05/18/25 47.8 kg  (105 lb 6.1 oz)   05/13/25 48.5 kg (107 lb)   05/06/25 46.6 kg (102 lb 11.2 oz)   04/29/25 47.2 kg (104 lb)   04/21/25 50.4 kg (111 lb 1.6 oz)   05/06/25 46.6 kg (102 lb 11.2 oz)   04/03/25 42.8 kg (94 lb 6.4 oz)        General: Pleasant, thin-appearing female who appears comfortable and in NAD.  Answers appropriately.    HEENT: Head is AT/NC. Sclera anicteric. No conjunctival injection.  Oropharynx is clear, moist and w/o exudate or lesions. No thrush. Good dentition.  Lungs: Non-labored breathing on RA.   Heart: Regular rate and rhythm on monitor.  Abdomen: Soft, non-distended, mildly tender to palpation throughout abd.  PEG site appears well-healed, some dry crust around site, no split gauze and unable to read numbers on external bumper (appears appropriately positioned), capped.  PEJ site without tube in stoma, some erythema around site that is scabbed over, no drainage.  No rebound or peritoneal signs.   Extremities: WWP, no pedal edema.  MSK: no gross deformity, moderate b/l muscle wasting (temporal, clavicle, squaring of shoulder joint), fat wasting (periorbital, depression between ribs)  Skin: No jaundice or rash  Neurologic: Grossly non-focal.  CN 2-12 grossly intact.   Psych: mood appropriate to situation           Data:   Labs and imaging below were independently reviewed and interpreted    LAB WORK:    BMP  Recent Labs   Lab 08/01/25  0603 07/31/25  2259 07/31/25  1811 07/31/25  1714 07/31/25  1509     --   --   --  132*   POTASSIUM 2.9*  --   --   --  4.5   CHLORIDE 104  --   --   --  92*   ELIZA 8.3*  --   --   --  9.5   CO2 28  --   --   --  28   BUN 3.4*  --   --   --  4.3*   CR 0.30*  --   --   --  0.39*   * 285* 432* 496* 699*  732*     CBC  Recent Labs   Lab 08/01/25  0603 07/31/25  1509   WBC 6.6 5.1   RBC 3.75* 4.25   HGB 11.2* 12.6   HCT 33.2* 37.4   MCV 89 88   MCH 29.9 29.6   MCHC 33.7 33.7   RDW 11.2 11.2    299     INR  Recent Labs   Lab 08/01/25  0603 07/31/25  1509    INR 1.09 0.97     LFTs  Recent Labs   Lab 07/31/25  1509   ALKPHOS 170*   BILITOTAL 0.2   PROTTOTAL 6.8   ALBUMIN 3.9      PANC  Recent Labs   Lab 07/31/25  1828   LIPASE 7*       IMAGING:  (Personally reviewed)    CT Abdomen Pelvis w Contrast (07/31/2025 6:02 PM)   IMPRESSION:  1. Jejunostomy tube is not visualized within the jejunostomy tract  over the left hemiabdomen. The tract appears intact extending to a  loop of small bowel along the left hemiabdomen wall. No fluid  collections or significant inflammatory stranding along the tract.  Gastrostomy tube remains in place.  2. Large stool burden seen throughout the colon, and inspissated stool  within the distal small bowel. Suggestive of slow motility, and likely  constipation.  3. Stable surgical changes of the abdomen as detailed above.  4. Bladder distended.       =======================================================================

## 2025-08-01 NOTE — PROGRESS NOTES
[facility]    Medicine Progress Note - Hospitalist Service    Date of Admission:  7/31/2025    Assessment & Plan   Chantell Kidd is a 59 year old female admitted on 7/31/2025. She has PMH of insulin-dependent DM following TPAIT (1/2012), migraine, hypothyroidism, adrenal insufficiency, and RNY with GJ tube for nutrition who presents to the ED for evaluation of Gtube malfunction. S/p IR consultation for replacement, though with closed track, prompting admission to Medicine Observation for GI consultation for new GJ-tube.     ** Please use PEGJ for meds upon return from procedure.**     ## GJ tube malfunction  ## Malnutrition s/p RNY and GJ tube:  Patient presents with Jtube that has been dislodged. Attempt at IR replacement- unsuccessful. J tube fell out ~ 4 days ago and patient has not had TF. Does tolerate PO intake and had been eating and drinking small amounts. PTA- continuous TF.   - GI consulted. Appreciate assistance and recommendations--> placement on 8/1  - RD consult, appreciate recommendations   - BMP, CBC  - Resume trickle feed after PEGJ placement  - mIVF for now    ## Dysphagia  Noting ongoing trouble with dysphagia.   - Order SLP on 8/2 after procedure PRIOR to swallow eval    # Subjective fevers   Noting fevers of 100F at home. None here. CBC WNL. CXR without infection (due to dysphagia as above).   - BCx 8.1 with NGTD  - Trend UA/UCx  - Trend daily CBC      ## Acute on Chronic abdominal pain: PTA Creon, famotidine, protonix, Linzess, reglan and sucralfate. Imaging w/ large stool burden. Benign abdominal exam. No nausea and vomiting. Suspect related to constipation.   - Resume  PTA medications: Famotidine, protonix, Linzess, reglan, sucralfate, creon, senna, miralax  when PEGJ replaced  - AXR if no BM on 8/2, will consider Miralax dosing per bowel prep if no relief from other meds  - PRN morphine, hold other narcotics  - Mineral enemas q8hr x3      ## Left  knee pain: Acute onset in past 2-3 days. NO reports of trauma. No swelling.  Knee xray negative   - PT consult      ## Post-pancreatectomy DM Type 1, poorly controlled  ## Chronic Pancreatitis and Sphincter of Oddi dysfunction s/p TPAIT (1/2012)  Patient has poorly controlled diabetes with multiple presentations with glucose >700. Today, presented with BG of 732. Most recent HbA1c of 15.8 (6/2025). No anion gap, Bicarb 28- no evidence of DKA. In the ED, patient received 1 L IVF; 4 units novolog and glucose improved to 496. Has previously been seen by inpatient endocrinology team with concern for patient not using insulin at home,though patient reports she has been using insulin regimen, though holding NPH with No TF.   - Endocrinology consulted, appreciate recommendations   - BG TID w/ meals and qhs  - Continue Lantus, NPH, MSSI per Endo note   - PTA Creon 96,000U TID with meals   - Hypoglycemia protocol     ## Chronic BLE Edema:   - HOLD PTA Lasix until PEGJ replaced            ## Adrenal insufficiency: PTA hydrocortisone.   - Continue PTA hydrocortisone 10mg in the AM and 15mg in the PM    - Replaced missed PO dose from 8/1 with 1x IV methylpred dose per pharmacy       ## Hypothyroidism: PTA Synthroid  - Continue PTA Synthroid     ## MDD  ## Anxiety  ## Insomnia: PTA duloxetine, trazodone  - Continue PTA duloxetine, trazodone      ## Hypokalemia  - RN replacment protocols (mg,K, phos)       Observation Goals: -diagnostic tests and consults completed and resulted, -vital signs normal or at patient baseline, -tolerating oral intake to maintain hydration, -returns to baseline functional status, -safe disposition plan has been identified, - GI consultation, - GTube return of function, - BG control , Nurse to notify provider when observation goals have been met and patient is ready for discharge.  Diet: NPO for Procedure/Surgery per Anesthesia Guidelines Except for: No Exceptions    DVT Prophylaxis: Low Risk/Ambulatory  with no VTE prophylaxis indicated and Ambulate every shift  [place] Catheter: Not present  Lines: None     Cardiac Monitoring: None  Code Status: Full Code      Clinically Significant Risk Factors Present on Admission        # Hypokalemia: Lowest K = 2.9 mmol/L in last 2 days, will replace as needed  # Hyponatremia: Lowest Na = 132 mmol/L in last 2 days, will monitor as appropriate  # Hypochloremia: Lowest Cl = 92 mmol/L in last 2 days, will monitor as appropriate  # Hypocalcemia: Lowest Ca = 8.3 mg/dL in last 2 days, will monitor and replace as appropriate                       # Financial/Environmental Concerns:           Social Drivers of Health    Food Insecurity: Low Risk  (5/15/2025)    Food Insecurity     Within the past 12 months, did you worry that your food would run out before you got money to buy more?: No     Within the past 12 months, did the food you bought just not last and you didn t have money to get more?: No   Recent Concern: Food Insecurity - High Risk (4/16/2025)    Food Insecurity     Within the past 12 months, did you worry that your food would run out before you got money to buy more?: Yes     Within the past 12 months, did the food you bought just not last and you didn t have money to get more?: Yes   Depression: At risk (5/13/2025)    PHQ-2     PHQ-2 Score: 5          Disposition Plan     Medically Ready for Discharge: Anticipated Tomorrow           The patient's care was discussed with the Attending Physician, Dr. Kingston, Bedside Nurse, Patient, and GI/Endocrinology/RD Consultant(s).    [facility]  Hospitalist Service  [facility]  Securely message with Vocera (more info)  Text page via [facility] Paging/Directory     This chart documentation was completed with Dragon voice-recognition software. Even though reviewed, this chart may still contain some grammatical, spelling, and word errors. Please contact the author for any questions or  clarifications.     ______________________________________________________________________    Interval History   Nursing/SW/consult/interdisciplinary healthcare worker's notes reviewed.     I reviewed all medications, new labs and imaging results over the last 24 hours. Please see discussion of these results in the A/P.    No acute events overnight. VSS. Noting ongoing pain in left lower abdomen and radiating into back. Slightly worse than chronic pain. Endorses subjective fevers at home up to 100 F. Endorses dysphagia. Endorses ongoing headaches. Endorses nausea at baseline, but denies vomiting. Denies dysuria. Last BM on 7/31.     Possible PEGJ tonight.     ROS: 12 point ROS negative other than the symptoms noted here or above in the assessment and plan.       Physical Exam   Vital Signs: Temp: 98.1  F (36.7  C) Temp src: Oral BP: 112/73 Pulse: 76   Resp: 16 SpO2: 100 % O2 Device: None (Room air)    Weight: 0 lbs 0 oz    General Appearance: In NAD, sitting in bed, frail and chronically ill looking   Respiratory: LS clear b/l, normal RR  Cardiovascular: S1, S2, no m/r/g  GI: BS+, all 4 quadrants, no masses, mild TTP in left lower quadrant, PEGJ site in place with moderate yellow drainage.  Skin: Intact on face, arms, legs. No wounds, bruising, or lesions noted.  Neuropsych:A&Ox4, moving all extremities        Medical Decision Making       100 MINUTES SPENT BY ME on the date of service doing chart review, history, exam, documentation & further activities per the note.      Data     I have personally reviewed the following data over the past 24 hrs:    6.6  \   11.2 (L)   / 273     139 104 3.4 (L) /  161 (H)   2.9 (L) 28 0.30 (L) \     ALT: N/A AST: N/A AP: N/A TBILI: N/A   ALB: N/A TOT PROTEIN: N/A LIPASE: 7 (L)     Procal: N/A CRP: <3.00 Lactic Acid: N/A       INR:  1.09 PTT:  N/A   D-dimer:  N/A Fibrinogen:  N/A       Imaging results reviewed over the past 24 hrs:   Recent Results (from the past 24 hours)   IR  Jejunostomy Tube Change    Narrative    PROCEDURE: Jejunostomy tube change.  Procedural Personnel  Attending physician(s): Drew Puri MD  Fellow physician(s): Dayton Nunes MD  Resident physician(s): None    Procedure Date (mm/dd/yyyy): 7/31/2025    Pre-procedure diagnosis: Left lower quadrant jejunostomy fell out.  Post-procedure diagnosis: Same  Procedure urgency: Urgent  Indication: Jejunostomy fell 4 days ago.  Additional clinical history:  16 fr J-tube replacement (fell out 4  days ago, nothing has been within the tract).     Complications: No immediate complications.      Impression    IMPRESSION:  Unable to re-access left upper quadrant jejunostomy stoma.    Plan:   - Recommend surgery consult for surgical options for regaining  jejunostomy access.  - There is a patent channel between the left lower quadrant ostomy and  the perineum, raising concern for source of peritonitis.    Findings and plan were discussed with patient's primary team by Dr. Nunes.  _______________________________________________________________    PROCEDURE SUMMARY:  -Left lower quadrant jejunostomy stoma was accessed with a TONEY 1  catheter and contrast demonstrating access into the peritoneum.    PROCEDURE DETAILS:    Pre-procedure  Consent: Informed consent for the procedure including risks, benefits  and alternatives was obtained and time-out was performed prior to the  procedure.  Preparation: The site was prepared and draped using maximal sterile  barrier technique including cutaneous antisepsis.    Anesthesia/sedation  Level of anesthesia/sedation: No sedation  Anesthesia/sedation administered by: Independent trained observer  under attending supervision with continuous monitoring of the  patient?s level of consciousness and physiologic status  Total intra-service sedation time (minutes): 0    Procedure summary  The left left lower quadrant stoma was accessed with a TONEY 1 and  Glidewire. No resistance was encountered while  advancing the system as  a unit. The guidewire was retracted, water-soluble contrast was  injected, which demonstrated outlining of the intestines suggestive of  intraperitoneal access.    The catheter was retracted and a few attempts were made in order to  regain access into the bowel. Attempts are unsuccessful. The procedure  was terminated.    Radiation Dose  Fluoroscopy time (minutes):4.2    Additional Details  Additional description of procedure: None  Acute procedural success: No  Registry event: V/3/g  Device used: None  Equipment details: None  Unique Device Identifiers: Not available  Specimens removed: None  Estimated blood loss (mL): Less than 10  Standardized report: [email or web address]_v3.1    Attestation  Signer name: AURORA BRUNO MD    I, AURORA BRUNO MD, attest that I was present for all critical  portions of the procedure and was immediately available to provide  guidance and assistance during the remainder of the procedure.    I have personally reviewed the examination and initial interpretation  and I agree with the findings.    AURORA BRUNO MD         SYSTEM ID:  [id number]   CT Abdomen Pelvis w Contrast    Narrative    EXAMINATION: CT ABDOMEN PELVIS W CONTRAST  7/31/2025 6:02 PM      CLINICAL HISTORY: abdominal pain, J tube dislodgement    COMPARISON: CT 5/15/2025.    PROCEDURE COMMENTS: CT of the abdomen was performed with iopamidol  (ISOVUE-370) solution 62mL intravenous contrast. Coronal and sagittal  reformatted images were obtained.    FINDINGS:    Devices:  -Percutaneous gastrostomy tube over the left upper quadrant with  balloon inflated in the gastric body.    Lower thorax:   Normal.    Abdomen and pelvis:  Extensive postsurgical changes of the abdomen including total  pancreatectomy, cholecystectomy, Venessa-en-Y gastric bypass with  choledochojejunostomy and gastrojejunostomy. Additionally noted  appendectomy and hysterectomy. Percutaneous jejunostomy tube is not  noted  over the left upper quadrant, the jejunostomy tract is noted  extending to loop of small bowel adjacent to the left hemiabdomen  abdominal wall. No significant fluid collection or stranding along the  jejunostomy tract.    Unremarkable liver. Similar pneumobilia secondary to  choledochojejunostomy. Spleen is surgically absent. Adrenal glands are  unremarkable. Symmetric cortical medullary enhancement of the kidneys.  No cortical deforming masses, stones, or hydronephrosis. Similar  mildly prominent left extrarenal pelvis. Visualized portions of the  ureters are normal along the tract. Phlebolith noted along the right  mid ureter is relate represent venous phleboliths of the gonadal vein  and not stone within the ureter. The bladder is significantly  distended, no enhancing mural nodularity or thickening of the wall.    Large volume inspissated stool within the colon, and distal small  bowel. No free abdominal air. No abdominal fluid collections. No  adenopathy.    Osseous structures:   Unremarkable. Minimal degenerative changes of the spine. No high-grade  osseous lesions..      Impression    IMPRESSION:  1. Jejunostomy tube is not visualized within the jejunostomy tract  over the left hemiabdomen. The tract appears intact extending to a  loop of small bowel along the left hemiabdomen wall. No fluid  collections or significant inflammatory stranding along the tract.  Gastrostomy tube remains in place.  2. Large stool burden seen throughout the colon, and inspissated stool  within the distal small bowel. Suggestive of slow motility, and likely  constipation.  3. Stable surgical changes of the abdomen as detailed above.  4. Bladder distended.     I have personally reviewed the examination and initial interpretation  and I agree with the findings.    GUICHO WATSON MD         SYSTEM ID:  [id number]   XR Knee Port Left 1/2 Views    Narrative    EXAM: XR KNEE PORT LEFT 1/2 VIEWS  LOCATION: [facility]  [facility]  DATE: 7/31/2025    INDICATION: left lnee pain  COMPARISON: None.      Impression    IMPRESSION: No visible fracture or dislocation.   XR Chest Port 1 View    Narrative    Portable chest    INDICATION: Subjective fevers. Reports of dysphagia.    COMPARISON: CT pulmonary angiogram 5/22/2025    Mildly low inspiratory volumes from portable technique. Heart size  normal. Cholecystectomy clips right upper abdomen. Lungs and pulmonary  vasculature appear normal. Bony structures appear grossly normal.      Impression    IMPRESSION: Low respiratory volumes with no other abnormal  cardiopulmonary radiographic abnormalities. Cholecystectomy.    BUZZ AVALOS MD         SYSTEM ID:  [id number]

## 2025-08-01 NOTE — PLAN OF CARE
Physical Therapy defer - Orders received, chart reviewed, discussed with patient and care team. Patient denies any acute PT needs at this time. Reports feeling safe to return home when medically cleared and has established HH PT to address L knee pain. Will complete consult and defer evaluation, please reconsult as appropriate if patient has decline in functional mobility requiring further skilled inpatient PT needs. Defer Discharge recommendations to medical team.

## 2025-08-01 NOTE — CONSULTS
Inpatient Diabetes Management Service : New Consult Note     Patient: Chantell Kidd   YOB: 1965    MRN: [id number]     Date of Consult : 08/01/2025   Date of Admission: 7/31/2025     Reason for Consult: S/p TPAit w/ DMI and poorly controlled DM    Consult Requestor: Lilliam Venegas PA-C            History of Present Illness:   Chantell Kidd is a 59 year old female admitted on 5/15/2025. 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 S/p IR consultation for replacement, though with closed track, prompting admission to Medicine Observation for GI consultation for new GJ-tube. . Inpatient Diabetes Service consulted for S/p TPAit w/ DMI and poorly controlled DM  .     History obtained via the patient, chart review and discussion with primary team.       Additional Historian:  none    Patient is  known to the Inpatient Diabetes Service from past admission(s).Last seen 5/16/25.     Last admission Lantus 4 units was too little and when she was on TF Lantus 12 units was a good dose. Will trial in between for Lantus dose today and start with Lantus 8 units while she is NPO. She had previously been seen by inpatient endocrinology team with concern for patient not using insulin at home. She continues to report she has been taking her insulin. Reports she has not eaten since Tuesday and had been taking 12 units of Lantus and has been doing about 10 units with every 4 hours while awake with correction scale.     Since discharging, she reports her blood sugars have remained high 500 and up on the following regimen: Lantus 12 units, 1 per 12 for carb coverage with a medium correction scale. When her TF was running, she was taking NPH 10 units twice a day. 6/5/25: Seen by the diabetes educator was recommended to increase Lantus 12 units  in am and 12 units in the PM Reported to the diabetes educator she was taking Lantus 10 units twice a day. 1 per 12 carb ratio and NPH 8 units in am . Reported her blood sugars had been running high. She has been seen her primary care doctor in June and July. Both times she was seen, her blood sugar was elevated 700. In June, her primary recommended she Will give her an additional 10 units to this will increase her Lantus insulin to 20 units twice a day if she is still high in the morning with an additional 10 units of NovoLog in the morning  Recommended she go to the ED if her blood sugars remain high. In July, did not make any changes per note. Recommended to follow up with her Endocrinologist. She has a follow up appointment 8/28 with Dr. Maher. She states she has reached out to her Endocrinologist regarding her elevated blood sugars. and was recommended to set up an appointment.     Last dose insulin PTA: Lantus 12 units 7/31 at 8 am.   Current inpatient regimen:  Medium correction scale Lantus 10 units.     BG at time of consult: 285  Planned Procedures/Surgeries: new GJ-tube. Surgery tonight at 1730    Relevant Labs on Admission:  if contributory, otherwise see labs below  Renal function: Creatinine (0.39), eGFR (>90)    BMP: Na (132), K (4.5) Bicarb (28), Anion Gap (12), Glucose (732)  BhB: N/A   Lactic Acid: 1.7   Infectious Disease: COVID (N/A), Influenza (N/A), Blood Cultures (Pending collection)       Inpatient Glucose Trends:           Diabetes History:   Diabetes Type and Duration: Pancreatogenic diabetes s/p total pancreatectomy and islet autotransplant with insulin dependence since 1/2012  GAD65 antibody, zinc transporter 8 antibody, islet antibody, insulin antibody not available on epic search.     Numerous C-peptides:  Component      Latest Ref Rng 8/28/2018  6:47 AM 9/3/2018  6:41 PM 9/6/2018  8:00 AM 9/7/2018  6:55 AM 4/18/2019  11:04 AM 4/3/2025  2:01 PM   C-Peptide      0.9 - 6.9 ng/mL 0.3 (L)   0.2 (L)  1.8  2.8  1.8  0.5 (L)    Patient Fasting?           Yes      PTA Medication Regimen:   She says she was taking what her sheet said that she given on last discharge:  She was discharged on:     Long-acting insulin: glargine (Lantus) - take 10 units once daily at 6pm. Take at same time each day.     2) Rapid-acting insulin: aspart (Novolog) carbohydrate coverage/mealtime insulin - take 1 unit per 12 grams of carbohydrates before meals and snacks.     3) Rapid-acting insulin: aspart (Novolog) correction - see chart below. Take for high blood glucoses three times daily before meals when eating (or every 4-6 hours when receiving tube feeding) and at bedtime.  You may add the correction dose to the carbohydrate coverage/mealtime insulin dose and give in one injection--ideally 10-15 minutes before a meal.  You may take the correction dose even if you skip a meal (as long as it has been 4 hours since previous correction dose).     Meal time sliding scale insulin:  If -199 give 1 unit  If -249 give 2 units  If -299 give 3 units  If -349 give 4 units  If BG greater than 350 give 5 units     Bedtime sliding scale insulin:              If -249 give 1 unit  If -299 give 2 units  If -349 give 3 units  If BG greater than 350 give 4 units        4) Intermediate-acting insulin: Novolin N (NPH). Take to cover tube feeds (dosed for Sagrario Marinelli at 45 ml/hr)   - Take Novolin N (NPH) 10 units at 9AM   - Take Novolin N (NPH) 10 units at 9PM     6/5/25: Seen by the diabetes educator was recommended to increase Lantus 12 units in am and 12 units in the PM Reported to the diabetes educator she was taking Lantus 10 units twice a day. 1 per 12 carb ratio and NPH 8 units in am . Reported her blood sugars had been running high.     6/26/25: Was seen by her PCP: Her BG at this appointemnt was in the 700's  Will give her an additional 10 units to this will increase her Lantus insulin to 20 units  "twice a day if she is still high in the morning with an additional 10 units of NovoLog in the morning     7/26/25: Did not make any changes to her insulin regimen. Recommended to follow up with endocrinologist and diabetes educator.     Had previously been on Omnipod while in [place] and had been well continued on this.        Glucose monitoring device/frequency/trends: Dexcom was put on her on 5/13/2025 by diabetes ed       Checks BG during the day every 4 hours. BG readings are 500 or just reads \"high\"   ------------------------  + peripheral neuropathy (numbness, pain in hands/ feet, altered sensation), denies retinopathy (last eye exam: reports she is due for an eye exam.), denies nephropathy, Reports gastroparesis (Sub-optimal study in March 2016 showed 100% retention at 1 hour and then rapid emptying), denies macrovascular disease      Most recent A1c: 15.8 on 4/30/3035    Hemoglobin at time of most recent A1c: 10.5  RBC transfusion 3 months prior to most recent A1c:  none       History of DKA: yes - in past 6 months in [place] (2024)       Safety Plan:                - Glucagon: +GVOKE                  Diet/Lifestyle/Living Situation:  reports she grazes. Notes she does cover for carbs.   Ability to Frederic Prescribed Regimen:  Unsure if she is doing insulin as prescribed as BGs are fairly well controlled on less insulin while she is in hospital.   Food/Housing Insecurity: denies.           Medications Impacting Glycemia:    Steroids: Methypred 8 mg IV x 1 at 1500 (to replace PTA hydrocortisone 10 mg am, 15 mg HS (adrenal insufficiency)   D5W containing solutions/medications:  d10 at 25 ml/hour, titrate to keep over 110   Other medications impacting glucose: none         Diet/Nutrition:    Orders Placed This Encounter      NPO for Procedure/Surgery per Anesthesia Guidelines Except for: No Exceptions     Supplements:  none  TF: Will start Sagrario rodriguez at 25/hr tonight for 8 hours , Will  advancing by 10mL q 8 hours " to PTA goal of Sagrario Marinelli Peptide 1.5 (or equivalent) @ 45 mL/hr (1080 mL/day) to provide 1661 kcal, 80 g protein, 149 g CHO   TPN: none           Review of Systems:    CC:    Constitutional: Reports fever and chills. Reports 60 pound weight loss in the last 2 months.   HEENT: Reports headache and blurred vision.    Cardiac: Denies chest pain, palpitations, or racing heart.    Respiratory: Reports  dyspnea on exertion. Denies wheezing and cough,   GI: Reports increased abdominal pain, tenderness and bloating. Reports nausea and vomiting. Denies constipation or diarrhea.    : Denies difficulty urinating, dysuria, and incontinence.    MSK/Integumentary:Reports LL swelling/edema. Reports weakness. Denies new rashes, wounds, and bruising.    Endocrine: Reports polyuria, polydipsia           Past Medical History:       Past Medical History:   Diagnosis Date    Bone and joint disord back, pelvis, leg complicat preg, childb, puerp 2010    Chronic abdominal pain     Chronic headache     Chronic pancreatitis (H)     S/P pancreatectomy    Depression with anxiety     Earache or other ear, nose, or throat complaint 2006    Fracture 2003    Gallbladder problem 2004    Gastro-oesophageal reflux disease     Gastroparesis 05/13/2025    Hearing problem 2008    Hypothyroidism 04/23/2015    Kidney stones     Low serum cortisol level     Migraines     Other bladder disorder 2000    Other chronic pain     STOMACH    Other chronic pain     LUMBAR SPINE    Peripheral neuropathy     Pneumonia 1980    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    TPIAT    Spasm of sphincter of Oddi     Stomach problems 2000    Transplant Islet Transplant    Uncomplicated asthma 1980    Urinary tract infection 2000    Vision disorder 2000             Past Surgical History:      Past Surgical History:   Procedure Laterality Date    ARTHROPLASTY CARPOMETACARPAL (THUMB JOINT)  05/02/2014    Procedure: ARTHROPLASTY CARPOMETACARPAL (THUMB JOINT);  Surgeon:  Carina Panda MD;  Location: [facility]    BACK SURGERY  1/6/2012    removed during Transplant    BIOPSY      BREAST SURGERY  05/2015    CHOLECYSTECTOMY  01/01/2004    COLONOSCOPY  07/18/2014    Procedure: COLONOSCOPY;  Surgeon: Aurora Sahu MD;  [facility]: [facility]    COLONOSCOPY N/A 08/01/2017    Procedure: COLONOSCOPY;  Colonoscopy and upper endoscopy;  Surgeon: Deirdre Harris MD;  [facility]: [facility]    ENDOSCOPIC INSERTION TUBE JEJUNOSTOMY N/A 05/02/2025    Procedure: Esophagogastroduodenoscopy, Jejunopexy, JEJUNOSTOMY TUBE PLACEMENT, GASTROSTOMY TUBE EXCHANGE;  Surgeon: Jose Francisco Perdomo MD;  [facility]    ENDOSCOPIC RETROGRADE CHOLANGIOPANCREATOGRAM      ENDOSCOPIC RETROGRADE CHOLANGIOPANCREATOGRAM  04/19/2011    Procedure:ENDOSCOPIC RETROGRADE CHOLANGIOPANCREATOGRAM; Pancreatic Stent Placement      ENDOSCOPIC RETROGRADE CHOLANGIOPANCREATOGRAM  05/26/2011    Procedure:ENDOSCOPIC RETROGRADE CHOLANGIOPANCREATOGRAM; with Pancreatic Stent Removal; Surgeon:DALE MIMS; Location[facility]    ENDOSCOPY UPPER, COLONOSCOPY, COMBINED  04/25/2012    Procedure:COMBINED ENDOSCOPY UPPER, COLONOSCOPY; Enteroscopy with Bile Duct Stent Removal, Colonoscopy  *[facility] Safe Room*; Surgeon:GRACY GODWINIQ; [facility]    ESOPHAGOSCOPY, GASTROSCOPY, DUODENOSCOPY (EGD), COMBINED  05/26/2011    Procedure:COMBINED ESOPHAGOSCOPY, GASTROSCOPY, DUODENOSCOPY (EGD); Surgeon:DALE MIMS; [facility]    ESOPHAGOSCOPY, GASTROSCOPY, DUODENOSCOPY (EGD), COMBINED N/A 10/30/2014    Procedure: COMBINED ESOPHAGOSCOPY, GASTROSCOPY, DUODENOSCOPY (EGD), BIOPSY SINGLE OR MULTIPLE;  Surgeon: Sarai Moon MD;  [facility]    ESOPHAGOSCOPY, GASTROSCOPY, DUODENOSCOPY (EGD), COMBINED Left 07/06/2015    Procedure: COMBINED ESOPHAGOSCOPY, GASTROSCOPY, DUODENOSCOPY (EGD), BIOPSY SINGLE OR MULTIPLE;  Surgeon: Thomas Estrada MD;  [facility]    ESOPHAGOSCOPY,  GASTROSCOPY, DUODENOSCOPY (EGD), COMBINED N/A 07/08/2016    Procedure: COMBINED ESOPHAGOSCOPY, GASTROSCOPY, DUODENOSCOPY (EGD), BIOPSY SINGLE OR MULTIPLE;  Surgeon: Eloy Klein MD;  [facility]    ESOPHAGOSCOPY, GASTROSCOPY, DUODENOSCOPY (EGD), COMBINED N/A 08/04/2016    Procedure: COMBINED ESOPHAGOSCOPY, GASTROSCOPY, DUODENOSCOPY (EGD), BIOPSY SINGLE OR MULTIPLE;  Surgeon: Jason Brown MD;  [facility]    ESOPHAGOSCOPY, GASTROSCOPY, DUODENOSCOPY (EGD), COMBINED N/A 08/01/2017    Procedure: COMBINED ESOPHAGOSCOPY, GASTROSCOPY, DUODENOSCOPY (EGD);;  Surgeon: Deirdre Harris MD;  [facility]    ESOPHAGOSCOPY, GASTROSCOPY, DUODENOSCOPY (EGD), COMBINED N/A 06/12/2019    Procedure: ESOPHAGOGASTRODUODENOSCOPY (EGD);  Surgeon: Jose Francisco Perdomo MD;  [facility]    GYN SURGERY      Hysterectomy and USO    [facility] ENDOSCOPY W EUS  07/20/2011    Procedure:COMBINED ENDOSCOPIC ULTRASOUND, ESOPHAGOSCOPY, GASTROSCOPY, DUODENOSCOPY (EGD); Surgeon:DARVIN DONOHUE; [facility]    HERNIORRHAPHY VENTRAL N/A 09/15/2016    Procedure: HERNIORRHAPHY VENTRAL;  Surgeon: Juanita Bernabe MD;  [facility]    HYSTERECTOMY  1997 or 1998    USO    INCISION AND DRAINAGE ABDOMEN WASHOUT, COMBINED  08/16/2012    Procedure: COMBINED INCISION AND DRAINAGE ABDOMEN WASHOUT;  ,debridement and Drainage Post Appendectomy;  Surgeon: Ron Austin MD;  [facility]    INJECT TRANSVERSUS ABDOMINIS PLANE (TAP) BLOCK BILATERAL Bilateral 05/26/2016    Procedure: INJECT TRANSVERSUS ABDOMINIS PLANE (TAP) BLOCK BILATERAL;  Surgeon: Leonard Mccallum MD;  [facility]    IR JEJUNOSTOMY TUBE CHANGE  7/31/2025    IR NG TUBE PLACEMENT REQ RAD & FLUORO  12/04/2017    IR NG TUBE PLACEMENT REQ RAD & FLUORO  07/07/2025    IR NG TUBE PLACEMENT REQ RAD & FLUORO  07/03/2025    LAPAROSCOPIC APPENDECTOMY  07/30/2012    Procedure: LAPAROSCOPIC APPENDECTOMY;  Open Appendectomy;  Surgeon: Norman  Ron Chandra MD;  [facility]    PANCREATECTOMY, TRANSPLANT AUTO ISLET CELL, COMBINED  01/06/2012    Procedure:COMBINED PANCREATECTOMY, TRANSPLANT AUTO ISLET CELL; Total  Pancreatectomy, Auto Islet Transplant, splenectomy, 18fr. transgastric-jejunal feeding tube placement, liver biopsy; Surgeon:PALAK LEE; [facility]    PICC DOUBLE LUMEN PLACEMENT Right 04/19/2025    5FR DL PICC, brachial medial vein. L-38cm, 3cm out.    PICC INSERTION - DOUBLE LUMEN Right 04/30/2025    39-3cm, Medial brachial vein    NE STOMACH SURGERY PROCEDURE UNLISTED      REPLACE GASTROSTOMY TUBE, PERCUTANEOUS N/A 08/30/2017    Procedure: REPLACE GASTROSTOMY TUBE, PERCUTANEOUS;  GJ Tube Change;  Surgeon: Jose Nath PA-C;  [facility]    SOFT TISSUE SURGERY  05/0412    SPLENECTOMY      TRANSPLANT  1/6/2012    auto Islet transplant             Social History:      Social History     Tobacco Use    Smoking status: Never    Smokeless tobacco: Never   Substance Use Topics    Alcohol use: No     Alcohol/week: 0.0 standard drinks of alcohol        History   Sexual Activity    Sexual activity: Yes             Family History:    Family History of Diabetes: Mother and maternal grandmother.     Family History   Problem Relation Age of Onset    Hypertension Mother     Diabetes Mother     Osteoporosis Mother     Cerebrovascular Disease Mother     Breast Cancer Mother     Depression Mother     Anxiety Disorder Mother     Heart Disease Mother     Cancer Mother     Arthritis Mother     Cancer Father         pancreatic cancer    Other Cancer Father     Asthma Father     Thyroid Disease Father     Diabetes Maternal Grandmother     Cardiovascular Maternal Grandmother     Coronary Artery Disease Maternal Grandmother     Hypertension Maternal Grandmother     Cerebrovascular Disease Maternal Grandmother     Breast Cancer Maternal Grandmother     Depression Maternal Grandmother     Osteoporosis Maternal Grandmother     Obesity  Maternal Grandmother     Heart Disease Maternal Grandmother     Allergies Maternal Grandmother     Cancer Maternal Grandfather         lung cancer    Cancer Sister         brain    Hypertension Sister     Cancer Sister         liver cancer    Alcohol/Drug Paternal Grandfather     Alzheimer Disease Paternal Grandfather     Hypertension Sister     Depression Sister     Anxiety Disorder Sister     Asthma Sister     Cancer Sister     Alcohol/Drug Sister     Asthma Son     Musculoskeletal Disorder Other     Depression Maternal Half-Sister     Anxiety Disorder Maternal Half-Sister     Mental Illness Maternal Half-Sister     Substance Abuse Maternal Half-Sister     Anesthesia Reaction Maternal Half-Sister     Asthma Maternal Half-Sister     Asthma Son     Asthma Maternal Half-Sister                Physical Exam:    /73 (BP Location: Left arm, Patient Position: Supine, Cuff Size: Adult Small)   Pulse 76   Temp 98.1  F (36.7  C) (Oral)   Resp 16   Wt 46 kg (101 lb 6.6 oz)   LMP  (LMP Unknown)   SpO2 100%   BMI 18.55 kg/m     General: Fatigued appearing.   HEENT:  NC/AT  ABD:  rounded, soft, Tender to touch ( GI provider in room when interviewing patient)   Skin:  warm and dry, no obvious lesions  MSK:   moving all extremities  Lymp:   + LE edema  Mental Status:  Alert and oriented x3  Psych:   Cooperative, good eye contact, full/appropriate affect           Laboratory Data:      Lab Results   Component Value Date    A1C 15.8 (H) 04/30/2025    A1C 19.1 (H) 04/15/2025    A1C 18.9 (H) 04/03/2025    A1C 17.1 (H) 01/23/2025    A1C 11.1 (H) 03/02/2023    A1C 7.3 (H) 11/09/2020    A1C 6.7 (A) 11/21/2019    A1C 8.2 (H) 06/11/2019    A1C Canceled, Test credited 06/10/2019    A1C 9.6 (H) 04/18/2019     Recent Labs   Lab Test 08/01/25  0603   WBC 6.6   RBC 3.75*   HGB 11.2*   HCT 33.2*   MCV 89   MCH 29.9   MCHC 33.7   RDW 11.2        Recent Labs   Lab Test 08/01/25  0832 08/01/25  0603 07/31/25  1714 07/31/25  1502    NA  --  139  --  132*   POTASSIUM  --  2.9*  --  4.5   CHLORIDE  --  104  --  92*   CO2  --  28  --  28   ANIONGAP  --  7  --  12   * 233*   < > 699*  732*   BUN  --  3.4*  --  4.3*   CR  --  0.30*  --  0.39*   ELIZA  --  8.3*  --  9.5    < > = values in this interval not displayed.     Recent Labs   Lab Test 07/31/25  1509 06/18/25  1233 06/09/25  1556   PROTTOTAL 6.8 6.7 7.2   ALBUMIN 3.9 4.0 4.2   BILITOTAL 0.2 0.2 0.2   ALKPHOS 170* 140 214*   AST  --   --  224*   ALT  --  78* 340*            Assessment and Recommendations:       Post-pancreatectomy diabetes s/p TPIAT 1/2012 treated as Type 1 Diabetes Mellitus complicated by peripheral neuropathy, hypoglycemia unawareness, and hyperglycemia. Poor control  (A1c 15.8 %  4/30/2025, Hgb: 10.5)  Enteral Feed/Steroid induced Hyperglycemia  Abdominal pain     Plan/Recommendations:     ** patient has T1DM - requires insulin, basal dose must not to be withheld entirely OR for prolonged periods, high risk for DKA**   - Give lantus 6 units every 24 hours at 1130 while NPO  - Hold NPH 5 units at 9 am as TF not currently running.   -Hold NPH 5 units at 9 pm to cover KF TF running at 25 ml/hour and  or greater. Hold NPH if TF held or not started.   -  Start IV insulin drip if BGs >300 x 2 checks.   - Novolog Meal Coverage: 1 units per 15 g CHO, TID AC and 1:15  PRN with snacks/supplements when she is no longer NPO  - BG monitoring:  every 4 hours while NPO.  - Continue medium correction scale every 4 hours while NPO. BG <140  - prn D10 10-50 .If Blood glucose is 100 or less, start D10 at 10 ml and titrate up to get  or more.  Stop D10 if blood glucose is 150 or more    PRN D10 at 15 ml/hr - Start if TF stopped or interrupted AND long-acting insulin on board to replace 75% cho of TF to avoid severe hypoglycemia   Stop 12 hours after last NPH   - Hypoglycemia protocol    - Carb counting protocol     Discussion:     Reports elevated BGs running high since  discharge. Last taken Lantus 12 units in the morning prior to arrival to ED and still was running high yesterday  Last time she ate was Tuesday. Last had TF running 4 days ago.  On admission, presented with BG of 699.  HbA1c of 15.8 4/30/25. Repeat A1c pending. No anion gap, Bg improved to 200 after receiving a total of 8 units of novolog in additon, had taken 12 untis of Lantus the morning of 7/31.  Per GI will place new GJ-tube. Surgery tonight at 1730. Per RD, will start at 1/2 her PTA TF rate at 25/hr once TF is started to prevent refeeding syndrome. Will start Lantus 50% of PTA dose. Will start with 1/2 her PTA NPH dose (this is with the reduced Lantus dose in the background)  As patient's BGs are unpredictable, will add prn D10 order and prn IV insulin order.    Discharge Planning: (tentative)    Medications: Lantus + NPH + Carb ratio + Correction scale.   Test Claims: TBD none needed.   Education: Anastasiia Bean saw her on 4/21/2025  May need a refresh prior to discharge.   Outpatient Follow-up:  recommend [facility] Endocrinology: Libby Jay RN and has follow up with Dr. Maher (Endocrinology on 8/21/25)          Thank you for this consult. IDS will continue to follow.      Please notify Inpatient Diabetes Service if changes are planned to steroids, nutrition, TPN/TF and anticipated procedures requiring prolonged NPO status.     BEE Felix CNP    To contact Inpatient Diabetes Service:     7 AM - 5 PM: Page the IDS DAVID following the patient that day (see filed or incomplete progress notes/consult notes under Endocrinology)    OR if uncertain of provider assignment: page job code [phone]    5 PM - 7 AM: First call after hours is to primary service.    For urgent after-hours questions, page job code for on call fellow: [phone]     I spent a total of 80 minutes on the date of the encounter doing prep/post-work, chart review, history and exam, documentation and further activities per the note including  lab review, multidisciplinary communication, counseling the patient and/or coordinating care regarding acute hyper/hypoglycemic management, as well as discharge management and planning/communication.  See note for details.

## 2025-08-02 LAB
ALBUMIN SERPL BCG-MCNC: 2.9 G/DL (ref 3.5–5.2)
ALP SERPL-CCNC: 119 U/L (ref 40–150)
ALT SERPL W P-5'-P-CCNC: 55 U/L (ref 0–50)
ANION GAP SERPL CALCULATED.3IONS-SCNC: 10 MMOL/L (ref 7–15)
AST SERPL W P-5'-P-CCNC: 50 U/L (ref 0–45)
BILIRUB SERPL-MCNC: 0.2 MG/DL
BILIRUBIN DIRECT (ROCHE PRO & PURE): 0.09 MG/DL (ref 0–0.45)
BUN SERPL-MCNC: 3.5 MG/DL (ref 8–23)
CALCIUM SERPL-MCNC: 8.1 MG/DL (ref 8.8–10.4)
CHLORIDE SERPL-SCNC: 101 MMOL/L (ref 98–107)
CREAT SERPL-MCNC: 0.3 MG/DL (ref 0.51–0.95)
EGFRCR SERPLBLD CKD-EPI 2021: >90 ML/MIN/1.73M2
ERYTHROCYTE [DISTWIDTH] IN BLOOD BY AUTOMATED COUNT: 11.4 % (ref 10–15)
GLUCOSE BLDC GLUCOMTR-MCNC: 163 MG/DL (ref 70–99)
GLUCOSE BLDC GLUCOMTR-MCNC: 179 MG/DL (ref 70–99)
GLUCOSE BLDC GLUCOMTR-MCNC: 234 MG/DL (ref 70–99)
GLUCOSE BLDC GLUCOMTR-MCNC: 243 MG/DL (ref 70–99)
GLUCOSE BLDC GLUCOMTR-MCNC: 243 MG/DL (ref 70–99)
GLUCOSE BLDC GLUCOMTR-MCNC: 257 MG/DL (ref 70–99)
GLUCOSE SERPL-MCNC: 214 MG/DL (ref 70–99)
HCO3 SERPL-SCNC: 24 MMOL/L (ref 22–29)
HCT VFR BLD AUTO: 35.2 % (ref 35–47)
HGB BLD-MCNC: 11.9 G/DL (ref 11.7–15.7)
MAGNESIUM SERPL-MCNC: 1.7 MG/DL (ref 1.7–2.3)
MCH RBC QN AUTO: 30.1 PG (ref 26.5–33)
MCHC RBC AUTO-ENTMCNC: 33.8 G/DL (ref 31.5–36.5)
MCV RBC AUTO: 89 FL (ref 78–100)
PHOSPHATE SERPL-MCNC: 3 MG/DL (ref 2.5–4.5)
PLATELET # BLD AUTO: 264 10E3/UL (ref 150–450)
POTASSIUM SERPL-SCNC: 3.8 MMOL/L (ref 3.4–5.3)
PROT SERPL-MCNC: 5 G/DL (ref 6.4–8.3)
RBC # BLD AUTO: 3.96 10E6/UL (ref 3.8–5.2)
SODIUM SERPL-SCNC: 135 MMOL/L (ref 135–145)
WBC # BLD AUTO: 7.8 10E3/UL (ref 4–11)

## 2025-08-02 PROCEDURE — 250N000013 HC RX MED GY IP 250 OP 250 PS 637

## 2025-08-02 PROCEDURE — 99207 PR APP CREDIT; MD BILLING SHARED VISIT: CPT | Mod: FS | Performed by: INTERNAL MEDICINE

## 2025-08-02 PROCEDURE — 120N000002 HC R&B MED SURG/OB UMMC

## 2025-08-02 PROCEDURE — G0378 HOSPITAL OBSERVATION PER HR: HCPCS

## 2025-08-02 PROCEDURE — 36415 COLL VENOUS BLD VENIPUNCTURE: CPT

## 2025-08-02 PROCEDURE — 82962 GLUCOSE BLOOD TEST: CPT

## 2025-08-02 PROCEDURE — 83735 ASSAY OF MAGNESIUM: CPT | Performed by: STUDENT IN AN ORGANIZED HEALTH CARE EDUCATION/TRAINING PROGRAM

## 2025-08-02 PROCEDURE — 99207 PR APP CREDIT; MD BILLING SHARED VISIT: CPT | Performed by: PHYSICIAN ASSISTANT

## 2025-08-02 PROCEDURE — 85014 HEMATOCRIT: CPT

## 2025-08-02 PROCEDURE — 250N000012 HC RX MED GY IP 250 OP 636 PS 637: Performed by: NURSE PRACTITIONER

## 2025-08-02 PROCEDURE — 84100 ASSAY OF PHOSPHORUS: CPT | Performed by: STUDENT IN AN ORGANIZED HEALTH CARE EDUCATION/TRAINING PROGRAM

## 2025-08-02 PROCEDURE — 80053 COMPREHEN METABOLIC PANEL: CPT

## 2025-08-02 PROCEDURE — 250N000013 HC RX MED GY IP 250 OP 250 PS 637: Performed by: PHYSICIAN ASSISTANT

## 2025-08-02 PROCEDURE — 250N000009 HC RX 250

## 2025-08-02 PROCEDURE — 99232 SBSQ HOSP IP/OBS MODERATE 35: CPT | Performed by: NURSE PRACTITIONER

## 2025-08-02 PROCEDURE — 82248 BILIRUBIN DIRECT: CPT | Performed by: PHYSICIAN ASSISTANT

## 2025-08-02 RX ORDER — DEXTROSE MONOHYDRATE 100 MG/ML
INJECTION, SOLUTION INTRAVENOUS CONTINUOUS PRN
Status: DISCONTINUED | OUTPATIENT
Start: 2025-08-02 | End: 2025-08-03 | Stop reason: HOSPADM

## 2025-08-02 RX ORDER — OXYCODONE HYDROCHLORIDE 10 MG/1
10 TABLET ORAL ONCE
Refills: 0 | Status: COMPLETED | OUTPATIENT
Start: 2025-08-03 | End: 2025-08-03

## 2025-08-02 RX ADMIN — LEVOTHYROXINE SODIUM 125 MCG: 0.12 TABLET ORAL at 06:51

## 2025-08-02 RX ADMIN — GABAPENTIN 400 MG: 400 CAPSULE ORAL at 08:36

## 2025-08-02 RX ADMIN — HYDROCORTISONE 10 MG: 10 TABLET ORAL at 08:33

## 2025-08-02 RX ADMIN — SUCRALFATE 1 G: 1 TABLET ORAL at 08:33

## 2025-08-02 RX ADMIN — FUROSEMIDE 40 MG: 40 TABLET ORAL at 08:34

## 2025-08-02 RX ADMIN — INSULIN ASPART 1 UNITS: 100 INJECTION, SOLUTION INTRAVENOUS; SUBCUTANEOUS at 06:53

## 2025-08-02 RX ADMIN — TOPIRAMATE 100 MG: 50 TABLET, FILM COATED ORAL at 08:36

## 2025-08-02 RX ADMIN — THIAMINE HCL TAB 100 MG 100 MG: 100 TAB at 08:34

## 2025-08-02 RX ADMIN — Medication 40 MG: at 09:53

## 2025-08-02 RX ADMIN — SIMETHICONE 80 MG: 80 TABLET, CHEWABLE ORAL at 20:22

## 2025-08-02 RX ADMIN — ACETAMINOPHEN 650 MG: 325 TABLET ORAL at 20:29

## 2025-08-02 RX ADMIN — TRAZODONE HYDROCHLORIDE 150 MG: 100 TABLET ORAL at 22:22

## 2025-08-02 RX ADMIN — DRONABINOL 5 MG: 5 CAPSULE ORAL at 16:15

## 2025-08-02 RX ADMIN — INSULIN ASPART 3 UNITS: 100 INJECTION, SOLUTION INTRAVENOUS; SUBCUTANEOUS at 17:30

## 2025-08-02 RX ADMIN — TOPIRAMATE 100 MG: 50 TABLET, FILM COATED ORAL at 20:22

## 2025-08-02 RX ADMIN — FAMOTIDINE 20 MG: 20 TABLET, FILM COATED ORAL at 22:22

## 2025-08-02 RX ADMIN — CYCLOBENZAPRINE 10 MG: 10 TABLET, FILM COATED ORAL at 02:33

## 2025-08-02 RX ADMIN — SIMETHICONE 80 MG: 80 TABLET, CHEWABLE ORAL at 08:36

## 2025-08-02 RX ADMIN — INSULIN ASPART 3 UNITS: 100 INJECTION, SOLUTION INTRAVENOUS; SUBCUTANEOUS at 10:06

## 2025-08-02 RX ADMIN — GABAPENTIN 800 MG: 400 CAPSULE ORAL at 22:22

## 2025-08-02 RX ADMIN — INSULIN ASPART 3 UNITS: 100 INJECTION, SOLUTION INTRAVENOUS; SUBCUTANEOUS at 22:21

## 2025-08-02 RX ADMIN — INSULIN GLARGINE 6 UNITS: 100 INJECTION, SOLUTION SUBCUTANEOUS at 09:53

## 2025-08-02 RX ADMIN — OXYCODONE HYDROCHLORIDE 5 MG: 5 TABLET ORAL at 09:53

## 2025-08-02 RX ADMIN — OXYCODONE HYDROCHLORIDE 5 MG: 5 TABLET ORAL at 03:03

## 2025-08-02 RX ADMIN — LINACLOTIDE 145 MCG: 145 CAPSULE, GELATIN COATED ORAL at 08:33

## 2025-08-02 RX ADMIN — Medication 40 MG: at 20:22

## 2025-08-02 RX ADMIN — HYDROCORTISONE 15 MG: 10 TABLET ORAL at 20:22

## 2025-08-02 RX ADMIN — INSULIN ASPART 2 UNITS: 100 INJECTION, SOLUTION INTRAVENOUS; SUBCUTANEOUS at 14:34

## 2025-08-02 RX ADMIN — POLYETHYLENE GLYCOL 3350 17 G: 17 POWDER, FOR SOLUTION ORAL at 08:33

## 2025-08-02 RX ADMIN — SUCRALFATE 1 G: 1 TABLET ORAL at 11:39

## 2025-08-02 RX ADMIN — SUCRALFATE 1 G: 1 TABLET ORAL at 16:16

## 2025-08-02 RX ADMIN — OXYCODONE HYDROCHLORIDE 5 MG: 5 TABLET ORAL at 20:21

## 2025-08-02 RX ADMIN — SENNOSIDES, DOCUSATE SODIUM 2 TABLET: 50; 8.6 TABLET, FILM COATED ORAL at 22:22

## 2025-08-02 RX ADMIN — SIMETHICONE 80 MG: 80 TABLET, CHEWABLE ORAL at 11:39

## 2025-08-02 RX ADMIN — INSULIN HUMAN 3 UNITS: 100 INJECTION, SUSPENSION SUBCUTANEOUS at 09:54

## 2025-08-02 RX ADMIN — GABAPENTIN 400 MG: 400 CAPSULE ORAL at 14:33

## 2025-08-02 RX ADMIN — INSULIN ASPART 1 UNITS: 100 INJECTION, SOLUTION INTRAVENOUS; SUBCUTANEOUS at 02:28

## 2025-08-02 RX ADMIN — SIMETHICONE 80 MG: 80 TABLET, CHEWABLE ORAL at 16:15

## 2025-08-02 RX ADMIN — SUCRALFATE 1 G: 1 TABLET ORAL at 20:23

## 2025-08-02 RX ADMIN — POLYETHYLENE GLYCOL 3350 17 G: 17 POWDER, FOR SOLUTION ORAL at 20:22

## 2025-08-02 RX ADMIN — DRONABINOL 5 MG: 5 CAPSULE ORAL at 08:33

## 2025-08-02 RX ADMIN — Medication 90 MG: at 09:52

## 2025-08-02 ASSESSMENT — ACTIVITIES OF DAILY LIVING (ADL)
ADLS_ACUITY_SCORE: 60
ADLS_ACUITY_SCORE: 43
ADLS_ACUITY_SCORE: 40
ADLS_ACUITY_SCORE: 43
ADLS_ACUITY_SCORE: 40
ADLS_ACUITY_SCORE: 61
ADLS_ACUITY_SCORE: 46
ADLS_ACUITY_SCORE: 40
ADLS_ACUITY_SCORE: 40
ADLS_ACUITY_SCORE: 61
ADLS_ACUITY_SCORE: 40
ADLS_ACUITY_SCORE: 43
ADLS_ACUITY_SCORE: 40
ADLS_ACUITY_SCORE: 46
ADLS_ACUITY_SCORE: 40
ADLS_ACUITY_SCORE: 43
ADLS_ACUITY_SCORE: 61
DEPENDENT_IADLS:: CLEANING;LAUNDRY;SHOPPING;TRANSPORTATION;MEDICATION MANAGEMENT
ADLS_ACUITY_SCORE: 60
ADLS_ACUITY_SCORE: 43
ADLS_ACUITY_SCORE: 40
ADLS_ACUITY_SCORE: 40
ADLS_ACUITY_SCORE: 43
ADLS_ACUITY_SCORE: 46

## 2025-08-02 NOTE — PROGRESS NOTES
Inpatient Diabetes Management Service: Daily Progress Note     HPI: Chantell Kidd is a 59 year old female admitted on 5/15/2025. 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 S/p IR consultation for replacement, though with closed track, prompting admission to Medicine Observation for GI consultation for new GJ-tube. . 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 Poor control  (A1c 15.8 %  4/30/2025, Hgb: 10.5)  Enteral Feed/Steroid induced Hyperglycemia  Acute on chronic Abdominal pain  GJ tube malfunction - GJ tube replaced this admission.      Plan/Recommendations:     ** patient has T1DM - requires insulin, basal dose must not to be withheld entirely OR for prolonged periods, high risk for DKA**   - Continue lantus 6 units every 24 hours at 1430 (give even if NPO)   - Start NPH 3+2 = 5 units at 9 am to cover KF TF running at 25 ml/hour and  or greater. Hold NPH if TF held or not started.   -Start NPH 10 units at 9 pm to cover KF TF running at 35 ml/hour and  or greater. Hold NPH if TF held or not started.   -  Start IV insulin drip if BGs >300 x 2 checks.   - Novolog Meal Coverage: 1 units per 15 g CHO, TID AC and 1:15  PRN with snacks/supplements when she is no longer NPO  - BG monitoring:  every 4 hours while on TF and minimal oral intake.   - Continue medium correction scale every 4 hours with minimal oral intake and continuous TF for BG <140  - PRN D10 at 50 ml/hr - Start if TF stopped or interrupted AND long-acting insulin on board to replace 75% cho of TF to avoid  severe hypoglycemia   Stop 12 hours after last NPH (When TF running at 25/hr)   - Hypoglycemia protocol    - Carb counting protocol      Discussion:     GJ-tube replaced last evening. Tf started at 15/hr at 9 pm and did not start at 25ml/ hr. NPH therefore was not given.  Received Methylpred 8 mg at 1500 yesterday to take place for PTA Hydrocortisone as did not have TF access (receives meds through [facility]) . TF was started 15ml/hr, received 22 carbs for 12 hours or 1.83 carbs an hour TF Increasing to 25ml/hr at 0900, 37 carbs for 12 for 24 hour running at 3 carbs an hour.  BG at 0100 163 and 179 at 0600 when TF running at 15/hr. Received 2 units. NPH in am will start 5 units of NPH with 25 ml/hr. 1/2 home dose. Unknown if Lantus 6 was too much as received Methlypred after receiving Lantus.  Continue 1 per 15 carb ratio for now as historically BG elevated with oral intake.(Patient reports not feeling like eating today) Will start PTA Hydrocortisone 10 mg this am and 15 mg this pm per primary team. Patient continues to have abdominal pain with nausea this morning.  Cr 0.30 GFR >90 Anion gap 10 Bicarb 24 Hgb 11.9 WBC 7.8. At 5 pm, TF will advance to 35ml/hr and at 0100, will advance to 45mL/hr. Will increase NPH to 7 units tonight.BG may run elevated but difficult to dose NPH with TF rates increasing every 8 hours and NPH has ~ 12 hour duration. Patient did not eat anything as diet was not advanced. Diet was not advanced today as patient will need to see speech therapy as she has been having issues swallowing. Speech was not able to see her today and will possibly see her tomorrow.    Addendum in addition to the NPH 5 units she received today, she required an additional 5 units of short acting insulin. BGs were still in the 200's. As TF will be increasing at 1700 and again to goal at 0100, will increase NPH to 10 units at 2100.     BGs running very different at home. Reported she may be running TF rate differently at  home. She thinks rate is 40/hr at goal rate, here she is 45/hr. Elevated BGs may also be related to uncovered carbs at home.        Discharge Planning: (tentative)    Medications: Lantus + NPH + Carb ratio + Correction scale.   Test Claims: TBD none needed.   Education: Anastasiia Bean saw her on 4/21/2025  May need a refresh prior to discharge.   Outpatient Follow-up:  [facility] Endocrinology: Libby Jay RN and has follow up with Dr. Maher (Endocrinology on 8/21/25)     Please notify Inpatient Diabetes Service if changes are planned to steroids, nutrition, TPN/TF and anticipated procedures requiring prolonged NPO status.         Interval History/Review of Systems :   The last 24 hours progress and nursing notes reviewed.  GJ tube replaced last evening.     Planned Procedures/Surgeries: none    Inpatient Glucose Control:       Recent Labs   Lab 08/02/25  0103 08/01/25  1908 08/01/25  1834 08/01/25  1716 08/01/25  1410 08/01/25  0832   * 125* 123* 135* 161* 205*             Medications Impacting Glycemia:   Steroids: Methypred 8 mg IV x 1 at 1500 (to replace PTA hydrocortisone 10 mg am, 15 mg HS (adrenal insufficiency)   D5W containing solutions/medications:  PRN d10 at 25 ml/hour, titrate to keep over 110   Other medications impacting glucose: none        Nutrition:   Orders Placed This Encounter      Clear Liquid Diet    TF: 8/1: Sagrario Datadecision started at 15/hr tonight for 8 hours , Will  advancing by 10mL q 8 hours to PTA goal of Sag[employer]ario [employer] Peptide 1.5 (or equivalent) @ 45 mL/hr (1080 mL/day) to provide 1661 kcal, 80 g protein, 149 g CHO   8/2 am Sagrario Wallet[employer] Peptide 1.5 advanced to 25/ml an hour 9 am- 5pm. At 5 pm, advance to 35/hr until 1 am, advance to 45ml/hr. 149 g CHO   TPN: none        Diabetes History: see full consult note for complete diabetes history   Diabetes Type and Duration: Pancreatogenic diabetes s/p total pancreatectomy and islet autotransplant with insulin dependence since  1/2012  GAD65 antibody, zinc transporter 8 antibody, islet antibody, insulin antibody not available on epic search.     Numerous C-peptides:  Component      Latest Ref Rng 8/28/2018  6:47 AM 9/3/2018  6:41 PM 9/6/2018  8:00 AM 9/7/2018  6:55 AM 4/18/2019  11:04 AM 4/3/2025  2:01 PM   C-Peptide      0.9 - 6.9 ng/mL 0.3 (L)  0.2 (L)  1.8  2.8  1.8  0.5 (L)    Patient Fasting?           Yes      PTA Medication Regimen:   She says she was taking what her sheet said that she given on last discharge:  She was discharged on:     Long-acting insulin: glargine (Lantus) - take 10 units once daily at 6pm. Take at same time each day.     2) Rapid-acting insulin: aspart (Novolog) carbohydrate coverage/mealtime insulin - take 1 unit per 12 grams of carbohydrates before meals and snacks.     3) Rapid-acting insulin: aspart (Novolog) correction - see chart below. Take for high blood glucoses three times daily before meals when eating (or every 4-6 hours when receiving tube feeding) and at bedtime.  You may add the correction dose to the carbohydrate coverage/mealtime insulin dose and give in one injection--ideally 10-15 minutes before a meal.  You may take the correction dose even if you skip a meal (as long as it has been 4 hours since previous correction dose).     Meal time sliding scale insulin:  If -199 give 1 unit  If -249 give 2 units  If -299 give 3 units  If -349 give 4 units  If BG greater than 350 give 5 units     Bedtime sliding scale insulin:              If -249 give 1 unit  If -299 give 2 units  If -349 give 3 units  If BG greater than 350 give 4 units        4) Intermediate-acting insulin: Novolin N (NPH). Take to cover tube feeds (dosed for Sagrario Marinelli at 45 ml/hr)   - Take Novolin N (NPH) 10 units at 9AM   - Take Novolin N (NPH) 10 units at 9PM      6/5/25: Seen by the diabetes educator was recommended to increase Lantus 12 units in am and 12 units in the PM Reported to the  "diabetes educator she was taking Lantus 10 units twice a day. 1 per 12 carb ratio and NPH 8 units in am . Reported her blood sugars had been running high.      6/26/25: Was seen by her PCP: Her BG at this appointemnt was in the 700's  Will give her an additional 10 units to this will increase her Lantus insulin to 20 units twice a day if she is still high in the morning with an additional 10 units of NovoLog in the morning      7/26/25: Did not make any changes to her insulin regimen. Recommended to follow up with endocrinologist and diabetes educator.      Had previously been on Omnipod while in [place] and had been well continued on this.         Glucose monitoring device/frequency/trends: Dexcom was put on her on 5/13/2025 by diabetes ed       Checks BG during the day every 4 hours. BG readings are 500 or just reads \"high\"         Physical Exam:   /76 (BP Location: Left arm)   Pulse 70   Temp 97.9  F (36.6  C) (Oral)   Resp 16   Wt 46 kg (101 lb 6.6 oz)   LMP  (LMP Unknown)   SpO2 99%   BMI 18.55 kg/m    General:  Walking in room, reports feeling nauseated.   HEENT:  NC/AT.  Lungs:  unremarkable,  ABD:  rounded  Skin:  warm and dry  MSK:   moving all extremities  Mental Status:  Alert and oriented x3  Psych:   Cooperative, good eye contact, full/appropriate affect           Data:     Lab Results   Component Value Date    A1C 18.3 (H) 08/01/2025    A1C 15.8 (H) 04/30/2025    A1C 19.1 (H) 04/15/2025    A1C 18.9 (H) 04/03/2025    A1C 17.1 (H) 01/23/2025    A1C 7.3 (H) 11/09/2020    A1C 6.7 (A) 11/21/2019    A1C 8.2 (H) 06/11/2019    A1C Canceled, Test credited 06/10/2019    A1C 9.6 (H) 04/18/2019       ROUTINE IP LABS (Last four results)  BMP  Recent Labs   Lab 08/02/25  0103 08/01/25  1908 08/01/25  1834 08/01/25  1716 08/01/25  1532 08/01/25  0832 08/01/25  0603 07/31/25  1714 07/31/25  1509   NA  --   --   --   --   --   --  139  --  132*   POTASSIUM  --   --   --   --  3.6  --  2.9*  --  4.5 "   CHLORIDE  --   --   --   --   --   --  104  --  92*   ELIZA  --   --   --   --   --   --  8.3*  --  9.5   CO2  --   --   --   --   --   --  28  --  28   BUN  --   --   --   --   --   --  3.4*  --  4.3*   CR  --   --   --   --   --   --  0.30*  --  0.39*   * 125* 123* 135*  --    < > 233*   < > 699*  732*    < > = values in this interval not displayed.     CBC  Recent Labs   Lab 08/02/25  0549 08/01/25  0603 07/31/25  1509   WBC 7.8 6.6 5.1   RBC 3.96 3.75* 4.25   HGB 11.9 11.2* 12.6   HCT 35.2 33.2* 37.4   MCV 89 89 88   MCH 30.1 29.9 29.6   MCHC 33.8 33.7 33.7   RDW 11.4 11.2 11.2    273 299     INR  Recent Labs   Lab 08/01/25  0603 07/31/25  1509   INR 1.09 0.97       Inpatient Diabetes Service will continue to follow, please don't hesitate to contact the team with any questions or concerns.     BEE Felix CNP    Plan discussed with patient, bedside RN, and primary team via this note.    To contact Inpatient Diabetes Service:     7 AM - 5 PM: Page the [facility] DAVID following the patient that day (see filed or incomplete progress notes/consult notes under Endocrinology)    OR if uncertain of provider assignment: page job code [phone]    5 PM - 7 AM: First call after hours is to primary service.    For urgent after-hours questions, page job code for on call fellow: [phone]     I spent a total of 45 minutes on the date of the encounter doing prep/post-work, chart review, history and exam, documentation and further activities per the note including lab review, multidisciplinary communication, counseling the patient and/or coordinating care regarding acute hyper/hypoglycemic management, as well as discharge management and planning/communication.

## 2025-08-02 NOTE — PLAN OF CARE
Goal Outcome Evaluation:      Plan of Care Reviewed With: patient    Overall Patient Progress: no changeOverall Patient Progress: no change    A&Ox4, able to make needs known. VSS. On RA. Denies cardiac chest pain and SOB. Ambulated to the bathroom with SBA/walker. Abdominal pain managed with prn Tylenol, Oxycodone and Flexeril. Intermittent nausea managed with IV Zofran. TF at 15 mL/hr via J tube. Next TF advancement due at 9 AM and Relizorb change at 11 AM. G tube clamped. Last BM on 8/1. Refused scheduled enema. Continue with POC.     Admitted/transferred from: PACU  2 RN full   skin assessment completed by Abbie MEDINA RN and Dayan VILLEDA RN.  Skin assessment finding: Redness around G tube site. Nonblanchable redness on coccyx.  Interventions/actions: G tube site cleaned and dressing applied. Mepilex to coccyx.     Bedside Emergency Equipment Present:  Suction Regulator: Yes  Suction Canister: Yes  Tubing between Regulator and Canister: Yes  O2 Regulator with Tree: Yes  Ambu Bag: Yes

## 2025-08-02 NOTE — PROGRESS NOTES
[facility]    Medicine Progress Note - Hospitalist Service, GOLD TEAM 6    Date of Admission:  7/31/2025    Assessment & Plan   Chantell Kidd is a 59 year old female admitted on 7/31/2025. She has PMH of insulin-dependent DM following TPAIT (1/2012), migraine, hypothyroidism, adrenal insufficiency, and RNY with venting PEG and PEJ tube for nutrition who presents to the ED for evaluation of PEJ malfunction. S/p IR consultation for replacement, though with closed track, prompting admission to Medicine Observation for GI consultation for new GJ-tube.     Updates for 8/2/2025:  - Advance TF as tolerated  - SLP consulted  - OK for full liquids  - Check UA and LFTs given new RUQ and R flank pain  - Voltaren for knee pain        # PEJ tube malfunction  # Malnutrition s/p RNY and PEG & PEJ tubes:  Patient presents with PEJ that has been dislodged. Attempt at IR replacement- unsuccessful. J tube fell out ~ 4 days ago and patient has not had TF. Does tolerate PO intake and had been eating and drinking small amounts. PTA- continuous TF.   - GI consulted, s/p PEJ placement on 8/1  - RD consult, appreciate recommendations   - Advance TF as tolerated    # Dysphagia  Reports dysphagia with solids. OK with liquids.   - SLP consulte pending  - OK for full liquids, no further advancement until swallowing evaluated     # Acute on Chronic abdominal pain: PTA Creon, famotidine, protonix, Linzess, reglan and sucralfate. Imaging w/ large stool burden. Benign abdominal exam. No nausea and vomiting. Suspect related to constipation. Patient reports having BM following enema yesterday. Now having increased RUQ and R flank pain today. Requesting IV morphine (scheduled) for better pain control. No other GI or urinary complaints. Afebrile. WBC normal. Hx cholecystectomy. Pain likely related to recent procedures, +/- constipation. Doubt biliary pathology. Cannot r/o upper UTI although would expect  patient to be more ill appearing.   - Check UA and LFTs  - Continue bowel regimen  - Oxycodone 5mg q6h prn  - Would avoid additional opiates at this time     # Left knee pain: Acute onset in past 2-3 days. NO reports of trauma. No swelling.  Knee xray negative   - PT consult   - Votaren gel      # Post-pancreatectomy DM Type 1, poorly controlled  # Chronic Pancreatitis and Sphincter of Oddi dysfunction s/p TPAIT (1/2012)  Patient has poorly controlled diabetes with multiple presentations with glucose >700. Today, presented with BG of 732. Most recent HbA1c of 15.8 (6/2025). No anion gap, Bicarb 28- no evidence of DKA. In the ED, patient received 1 L IVF; 4 units novolog and glucose improved to 496. Has previously been seen by inpatient endocrinology team with concern for patient not using insulin at home,though patient reports she has been using insulin regimen, though holding NPH with No TF.   - Endocrinology consulted, appreciate recommendations   - BG TID w/ meals and qhs  - Continue Lantus, NPH, MSSI per Endo note   - PTA Creon 96,000U TID with meals   - Hypoglycemia protocol     # Chronic BLE Edema:   - HOLD PTA Lasix until PEGJ replaced            # Adrenal insufficiency: PTA hydrocortisone.   - Continue PTA hydrocortisone 10mg in the AM and 15mg in the PM      # Hypothyroidism: PTA Synthroid  - Continue PTA Synthroid     # MDD  # Anxiety  # Insomnia: PTA duloxetine, trazodone  - Continue PTA duloxetine, trazodone    # Hypokalemia  - RN replacment protocols (mg,K, phos)          Diet: Adult Formula Drip Feeding: Continuous [place] [place] Peptide 1.5; Jejunostomy; Goal Rate: 45; mL/hr; once PEJ replaced, start TF at 15mL/hr, advance 10mL every 8 hours to goal rate. This has been ok'ed by HM CNP and GI PAC.  Clear Liquid Diet    DVT Prophylaxis: Pneumatic Compression Devices  [place]kee Catheter: Not present  Lines: None     Cardiac Monitoring: None  Code Status: Full Code      Clinically Significant Risk Factors Present  on Admission        # Hypokalemia: Lowest K = 2.9 mmol/L in last 2 days, will replace as needed    # Hypocalcemia: Lowest iCa = 4.1 mg/dL in last 2 days, will monitor and replace as appropriate                    # Severe Malnutrition: based on nutrition assessment and treatment provided per dietitian's recommendations.     # Financial/Environmental Concerns: none         Social Drivers of Health    Food Insecurity: Low Risk  (5/15/2025)    Food Insecurity     Within the past 12 months, did you worry that your food would run out before you got money to buy more?: No     Within the past 12 months, did the food you bought just not last and you didn t have money to get more?: No   Recent Concern: Food Insecurity - High Risk (4/16/2025)    Food Insecurity     Within the past 12 months, did you worry that your food would run out before you got money to buy more?: Yes     Within the past 12 months, did the food you bought just not last and you didn t have money to get more?: Yes   Depression: At risk (5/13/2025)    PHQ-2     PHQ-2 Score: 5          Disposition Plan     Medically Ready for Discharge: Anticipated in 2-4 Days           The patient's care was discussed with the Attending Physician, Dr. Le and Patient.    Wilmer Sadler PA-C  Hospitalist Service, GOLD TEAM [address] [place] [address]  Securely message with [email or web address] (more info)  Text page via [facility] Paging/Directory   See signed in provider for up to date coverage information  ______________________________________________________________________    Interval History   Patient reports increased R flank and abdominal pain today. Denies any urinary symptoms. No nausea or vomiting. No chest pain or dyspnea. No cough. Currently tolerating TF. Requesting diet advancement. SLP consult pending. Patient notes dysphagia with solid foods only.  Currently tolerating liquids.    Physical Exam   Vital Signs: Temp: 98.1  F (36.7  C) Temp  src: Oral BP: 131/78 Pulse: 90   Resp: 18 SpO2: 99 % O2 Device: None (Room air) Oxygen Delivery: 2 LPM  Weight: 101 lbs 0 oz    General Appearance:  Awake. Alert. Oriented x3. NAD.   HEENT:  Unremarkable.   Respiratory:  Normal effort. CTAB. No wheezing, rhonchi, rales.   Cardiovascular:  RRR. S1/S2. No murmurs.   GI:  Soft, non-distended, PEG and PEJ present, tender around tubes but also RUQ tenderness, +BS. Right CVA tenderness noted.  Extremity:  No pitting edema.   Skin:  No visible rash.   Neuro:  Grossly non-focal.      Medical Decision Making       45 MINUTES SPENT BY ME on the date of service doing chart review, history, exam, documentation & further activities per the note.      Data     I have personally reviewed the following data over the past 24 hrs:    7.8  \   11.9   / 264     135 101 3.5 (L) /  234 (H)   3.8 24 0.30 (L) \     TSH: N/A T4: N/A A1C: N/A       Imaging results reviewed over the past 24 hrs:   Recent Results (from the past 24 hours)   XR Surgery NITHIN L/T 5 Min Fluoro w Stills    Narrative    This exam was marked as non-reportable because it will not be read by a   radiologist or a [facility] non-radiologist provider.

## 2025-08-02 NOTE — PLAN OF CARE
Goal Outcome Evaluation:      Plan of Care Reviewed With: patient      /78 (BP Location: Right arm)   Pulse 90   Temp 98.1  F (36.7  C) (Oral)   Resp 18   Wt 45.8 kg (101 lb)   LMP  (LMP Unknown)   SpO2 99%   BMI 18.47 kg/m          Patient is alert and oriented X 4, calls appropriately, tolerating tube feeds, clear liquid diet, did not order any food today, on RA, up wit assist of 1, blood glucose checks every 4 hours, labs reviewed with no replacement, G/J tube with G to gravity and J infusing tube feeds, tube feeds advanced to 25 ml/hr at 0900, then advanced to 35 ml/hr at 1700, next advancement is at 0100 to goal rate of 45 ml/hr. Continue with current POC.

## 2025-08-02 NOTE — CONSULTS
Care Management Initial Consult    General Information  Assessment completed with: Patient,    Type of CM/SW Visit: Initial Assessment    Primary Care Provider verified and updated as needed: Yes   Readmission within the last 30 days: no previous admission in last 30 days      Reason for Consult: discharge planning, utilization management concerns  Advance Care Planning: Advance Care Planning Reviewed: verified with patient, no concerns identified (pt has at home)          Communication Assessment  Patient's communication style: spoken language (English or Bilingual)    Hearing Difficulty or Deaf: no   Wear Glasses or Blind: yes    Cognitive  Cognitive/Neuro/Behavioral: WDL  Level of Consciousness: alert  Arousal Level: opens eyes spontaneously  Orientation: oriented x 4  Mood/Behavior: calm, cooperative, behavior appropriate to situation  Best Language: 0 - No aphasia  Speech: hoarse, clear, logical, spontaneous    Living Environment:   People in home: spouse     Current living Arrangements: house      Able to return to prior arrangements: yes       Family/Social Support:  Care provided by: spouse/significant other, self  Provides care for: no one  Marital Status:   Support system: , Children          Description of Support System: Supportive, Involved    Support Assessment: Adequate family and caregiver support, Adequate social supports    Current Resources:   Patient receiving home care services: Yes  Skilled Home Care Services: Skilled Nursing, Physical Therapy, Occupational Therapy     Community Resources: DME, Home Care, Home Infusion  Equipment currently used at home: walker, rolling, wheelchair, manual, grab bar, toilet, grab bar, tub/shower  Supplies currently used at home: Enteral Nutrition & Supplies, Diabetic Supplies    Employment/Financial:  Employment Status: disabled        Financial Concerns: none   Referral to Financial Worker: No       Does the patient's insurance plan have a 3 day  qualifying hospital stay waiver?  No    Lifestyle & Psychosocial Needs:  Social Drivers of Health     Food Insecurity: Low Risk  (5/15/2025)    Food Insecurity     Within the past 12 months, did you worry that your food would run out before you got money to buy more?: No     Within the past 12 months, did the food you bought just not last and you didn t have money to get more?: No   Recent Concern: Food Insecurity - High Risk (4/16/2025)    Food Insecurity     Within the past 12 months, did you worry that your food would run out before you got money to buy more?: Yes     Within the past 12 months, did the food you bought just not last and you didn t have money to get more?: Yes   Depression: At risk (5/13/2025)    PHQ-2     PHQ-2 Score: 5   Housing Stability: Low Risk  (5/15/2025)    Housing Stability     Do you have housing? : Yes     Are you worried about losing your housing?: No   Tobacco Use: Low Risk  (7/26/2025)    Patient History     Smoking Tobacco Use: Never     Smokeless Tobacco Use: Never     Passive Exposure: Not on file   Financial Resource Strain: Low Risk  (5/15/2025)    Financial Resource Strain     Within the past 12 months, have you or your family members you live with been unable to get utilities (heat, electricity) when it was really needed?: No   Alcohol Use: Not on file   Transportation Needs: Low Risk  (5/15/2025)    Transportation Needs     Within the past 12 months, has lack of transportation kept you from medical appointments, getting your medicines, non-medical meetings or appointments, work, or from getting things that you need?: No   Physical Activity: Not on file   Interpersonal Safety: Low Risk  (8/1/2025)    Interpersonal Safety     Do you feel physically and emotionally safe where you currently live?: Yes     Within the past 12 months, have you been hit, slapped, kicked or otherwise physically hurt by someone?: No     Within the past 12 months, have you been humiliated or emotionally  abused in other ways by your partner or ex-partner?: No   Stress: Not on file   Social Connections: Not on file   Health Literacy: Not on file       Functional Status:  Prior to admission patient needed assistance:   Dependent ADLs:: Independent  Dependent IADLs:: Cleaning, Laundry, Shopping, Transportation, Medication Management  Assesssment of Functional Status: At functional baseline    Mental Health Status:  Mental Health Status: No Current Concerns       Chemical Dependency Status:  Chemical Dependency Status: No Current Concerns             Values/Beliefs:  Spiritual, Cultural Beliefs, Temple Practices, Values that affect care: other (see comments) ([employer])               Discussed  Partnership in Safe Discharge Planning  document with patient/family: No    Additional Information:  Met with patient at bedside, introduced self and role. Patient verified address, pcp and insurance. Patient states she had help when she lived in [place] but does not receive help at home now. Patient receives services from [facility] and [facility] for tube feeds. Pt states she has a walker, wheelchair, can and grab bars at home. Patient's  will transport home when discharged.     Informed [facility], Send updated infusion order to [facility].     [facility]   Phone  [phone]  Fax  [phone]     [facility]  Phone # [phone]  Fax # [phone]     Next Steps:     Update/RAMAN for home care and home infusion.   IMM if discharge 8/3 or after.     Aggie Meraz V, RN  Weekend Covering Nurse Care Coordinator  [facility] Acute Care Management  Searchable on Vocera: 7A SOT RNCC, 7B Med Surg RNCC

## 2025-08-02 NOTE — PLAN OF CARE
Goal Outcome Evaluation:      Plan of Care Reviewed With: patient    Overall Patient Progress: no changeOverall Patient Progress: no change    Outcome Evaluation: discharge home with family with resumption of services.      Aggie Meraz RN  [facility]  Inpatient Care Management - CASUAL

## 2025-08-02 NOTE — PROGRESS NOTES
Brief GI note:    Patient post-procedure for PEJ. Tfs started. No issues with the tube.     GI will be available for any questions, if arise    Suresh Bowser MD

## 2025-08-03 ENCOUNTER — APPOINTMENT (OUTPATIENT)
Dept: GENERAL RADIOLOGY | Facility: CLINIC | Age: 60
DRG: 393 | End: 2025-08-03
Attending: PHYSICIAN ASSISTANT
Payer: MEDICARE

## 2025-08-03 VITALS
BODY MASS INDEX: 17.69 KG/M2 | TEMPERATURE: 97.7 F | RESPIRATION RATE: 18 BRPM | SYSTOLIC BLOOD PRESSURE: 99 MMHG | WEIGHT: 96.7 LBS | DIASTOLIC BLOOD PRESSURE: 72 MMHG | OXYGEN SATURATION: 95 % | HEART RATE: 96 BPM

## 2025-08-03 LAB
ALBUMIN UR-MCNC: NEGATIVE MG/DL
ANION GAP SERPL CALCULATED.3IONS-SCNC: 8 MMOL/L (ref 7–15)
APPEARANCE UR: CLEAR
BACTERIA #/AREA URNS HPF: ABNORMAL /HPF
BILIRUB UR QL STRIP: NEGATIVE
BUN SERPL-MCNC: 4.6 MG/DL (ref 8–23)
CALCIUM SERPL-MCNC: 8.7 MG/DL (ref 8.8–10.4)
CHLORIDE SERPL-SCNC: 102 MMOL/L (ref 98–107)
COLOR UR AUTO: ABNORMAL
CREAT SERPL-MCNC: 0.35 MG/DL (ref 0.51–0.95)
EGFRCR SERPLBLD CKD-EPI 2021: >90 ML/MIN/1.73M2
ERYTHROCYTE [DISTWIDTH] IN BLOOD BY AUTOMATED COUNT: 11.5 % (ref 10–15)
GLUCOSE BLDC GLUCOMTR-MCNC: 207 MG/DL (ref 70–99)
GLUCOSE BLDC GLUCOMTR-MCNC: 230 MG/DL (ref 70–99)
GLUCOSE BLDC GLUCOMTR-MCNC: 273 MG/DL (ref 70–99)
GLUCOSE BLDC GLUCOMTR-MCNC: 278 MG/DL (ref 70–99)
GLUCOSE SERPL-MCNC: 273 MG/DL (ref 70–99)
GLUCOSE UR STRIP-MCNC: 300 MG/DL
HCO3 SERPL-SCNC: 26 MMOL/L (ref 22–29)
HCT VFR BLD AUTO: 36.1 % (ref 35–47)
HGB BLD-MCNC: 12.1 G/DL (ref 11.7–15.7)
HGB UR QL STRIP: NEGATIVE
KETONES UR STRIP-MCNC: NEGATIVE MG/DL
LEUKOCYTE ESTERASE UR QL STRIP: NEGATIVE
MAGNESIUM SERPL-MCNC: 1.9 MG/DL (ref 1.7–2.3)
MCH RBC QN AUTO: 29.4 PG (ref 26.5–33)
MCHC RBC AUTO-ENTMCNC: 33.5 G/DL (ref 31.5–36.5)
MCV RBC AUTO: 88 FL (ref 78–100)
MUCOUS THREADS #/AREA URNS LPF: PRESENT /LPF
NITRATE UR QL: NEGATIVE
PH UR STRIP: 8 [PH] (ref 5–7)
PHOSPHATE SERPL-MCNC: 2.6 MG/DL (ref 2.5–4.5)
PLATELET # BLD AUTO: 296 10E3/UL (ref 150–450)
POTASSIUM SERPL-SCNC: 3.8 MMOL/L (ref 3.4–5.3)
RBC # BLD AUTO: 4.11 10E6/UL (ref 3.8–5.2)
RBC URINE: 0 /HPF
SODIUM SERPL-SCNC: 136 MMOL/L (ref 135–145)
SP GR UR STRIP: 1.01 (ref 1–1.03)
SQUAMOUS EPITHELIAL: 1 /HPF
UROBILINOGEN UR STRIP-MCNC: NORMAL MG/DL
WBC # BLD AUTO: 6.5 10E3/UL (ref 4–11)
WBC URINE: 1 /HPF

## 2025-08-03 PROCEDURE — 99207 PR APP CREDIT; MD BILLING SHARED VISIT: CPT | Mod: FS | Performed by: INTERNAL MEDICINE

## 2025-08-03 PROCEDURE — 85027 COMPLETE CBC AUTOMATED: CPT

## 2025-08-03 PROCEDURE — 81001 URINALYSIS AUTO W/SCOPE: CPT | Performed by: PHYSICIAN ASSISTANT

## 2025-08-03 PROCEDURE — 250N000013 HC RX MED GY IP 250 OP 250 PS 637

## 2025-08-03 PROCEDURE — 250N000013 HC RX MED GY IP 250 OP 250 PS 637: Performed by: INTERNAL MEDICINE

## 2025-08-03 PROCEDURE — G0378 HOSPITAL OBSERVATION PER HR: HCPCS

## 2025-08-03 PROCEDURE — 83735 ASSAY OF MAGNESIUM: CPT | Performed by: INTERNAL MEDICINE

## 2025-08-03 PROCEDURE — 80051 ELECTROLYTE PANEL: CPT

## 2025-08-03 PROCEDURE — 82962 GLUCOSE BLOOD TEST: CPT

## 2025-08-03 PROCEDURE — 250N000013 HC RX MED GY IP 250 OP 250 PS 637: Performed by: PHYSICIAN ASSISTANT

## 2025-08-03 PROCEDURE — 84100 ASSAY OF PHOSPHORUS: CPT | Performed by: INTERNAL MEDICINE

## 2025-08-03 PROCEDURE — 99232 SBSQ HOSP IP/OBS MODERATE 35: CPT | Performed by: NURSE PRACTITIONER

## 2025-08-03 PROCEDURE — 74019 RADEX ABDOMEN 2 VIEWS: CPT | Mod: 26 | Performed by: RADIOLOGY

## 2025-08-03 PROCEDURE — 36415 COLL VENOUS BLD VENIPUNCTURE: CPT

## 2025-08-03 PROCEDURE — 99239 HOSP IP/OBS DSCHRG MGMT >30: CPT | Mod: FS | Performed by: PHYSICIAN ASSISTANT

## 2025-08-03 PROCEDURE — 74019 RADEX ABDOMEN 2 VIEWS: CPT

## 2025-08-03 PROCEDURE — 250N000009 HC RX 250

## 2025-08-03 RX ORDER — OXYCODONE HYDROCHLORIDE 5 MG/1
5 TABLET ORAL EVERY 6 HOURS PRN
Qty: 12 TABLET | Refills: 0 | Status: SHIPPED | OUTPATIENT
Start: 2025-08-03

## 2025-08-03 RX ADMIN — GABAPENTIN 400 MG: 400 CAPSULE ORAL at 08:06

## 2025-08-03 RX ADMIN — FUROSEMIDE 40 MG: 40 TABLET ORAL at 08:05

## 2025-08-03 RX ADMIN — INSULIN ASPART 2 UNITS: 100 INJECTION, SOLUTION INTRAVENOUS; SUBCUTANEOUS at 06:57

## 2025-08-03 RX ADMIN — LINACLOTIDE 145 MCG: 145 CAPSULE, GELATIN COATED ORAL at 08:03

## 2025-08-03 RX ADMIN — OXYCODONE HYDROCHLORIDE 5 MG: 5 TABLET ORAL at 07:03

## 2025-08-03 RX ADMIN — POLYETHYLENE GLYCOL 3350 17 G: 17 POWDER, FOR SOLUTION ORAL at 08:05

## 2025-08-03 RX ADMIN — THIAMINE HCL TAB 100 MG 100 MG: 100 TAB at 08:06

## 2025-08-03 RX ADMIN — INSULIN ASPART 3 UNITS: 100 INJECTION, SOLUTION INTRAVENOUS; SUBCUTANEOUS at 14:41

## 2025-08-03 RX ADMIN — ACETAMINOPHEN 650 MG: 325 TABLET ORAL at 08:03

## 2025-08-03 RX ADMIN — Medication 90 MG: at 08:04

## 2025-08-03 RX ADMIN — SUCRALFATE 1 G: 1 TABLET ORAL at 08:05

## 2025-08-03 RX ADMIN — SUCRALFATE 1 G: 1 TABLET ORAL at 16:27

## 2025-08-03 RX ADMIN — TOPIRAMATE 100 MG: 50 TABLET, FILM COATED ORAL at 08:07

## 2025-08-03 RX ADMIN — PANCRELIPASE 8 CAPSULE: 60000; 12000; 38000 CAPSULE, DELAYED RELEASE PELLETS ORAL at 08:15

## 2025-08-03 RX ADMIN — OXYCODONE HYDROCHLORIDE 10 MG: 10 TABLET ORAL at 00:00

## 2025-08-03 RX ADMIN — Medication 40 MG: at 08:04

## 2025-08-03 RX ADMIN — SIMETHICONE 80 MG: 80 TABLET, CHEWABLE ORAL at 12:00

## 2025-08-03 RX ADMIN — HYDROCORTISONE 10 MG: 10 TABLET ORAL at 08:06

## 2025-08-03 RX ADMIN — GABAPENTIN 400 MG: 400 CAPSULE ORAL at 14:02

## 2025-08-03 RX ADMIN — DRONABINOL 5 MG: 5 CAPSULE ORAL at 16:27

## 2025-08-03 RX ADMIN — INSULIN ASPART 3 UNITS: 100 INJECTION, SOLUTION INTRAVENOUS; SUBCUTANEOUS at 10:28

## 2025-08-03 RX ADMIN — SUCRALFATE 1 G: 1 TABLET ORAL at 12:00

## 2025-08-03 RX ADMIN — OXYCODONE HYDROCHLORIDE 5 MG: 5 TABLET ORAL at 14:03

## 2025-08-03 RX ADMIN — SIMETHICONE 80 MG: 80 TABLET, CHEWABLE ORAL at 08:05

## 2025-08-03 RX ADMIN — LEVOTHYROXINE SODIUM 125 MCG: 0.12 TABLET ORAL at 06:57

## 2025-08-03 RX ADMIN — CYCLOBENZAPRINE 10 MG: 10 TABLET, FILM COATED ORAL at 08:03

## 2025-08-03 RX ADMIN — SIMETHICONE 80 MG: 80 TABLET, CHEWABLE ORAL at 16:27

## 2025-08-03 RX ADMIN — INSULIN ASPART 2 UNITS: 100 INJECTION, SOLUTION INTRAVENOUS; SUBCUTANEOUS at 02:14

## 2025-08-03 RX ADMIN — DRONABINOL 5 MG: 5 CAPSULE ORAL at 08:05

## 2025-08-03 ASSESSMENT — ACTIVITIES OF DAILY LIVING (ADL)
ADLS_ACUITY_SCORE: 45
ADLS_ACUITY_SCORE: 43
ADLS_ACUITY_SCORE: 45
ADLS_ACUITY_SCORE: 42
ADLS_ACUITY_SCORE: 42
ADLS_ACUITY_SCORE: 45
ADLS_ACUITY_SCORE: 42
ADLS_ACUITY_SCORE: 45
ADLS_ACUITY_SCORE: 42

## 2025-08-03 NOTE — PLAN OF CARE
Goal Outcome Evaluation:      Plan of Care Reviewed With: patient    Overall Patient Progress: improvingOverall Patient Progress: improving    A&Ox4, able to make needs known. VSS. On RA. Denies cardiac chest pain, SOB and n/v. Prn Oxycodone and Tylenol given for 9/10 abdominal and back pain with minimal relief. Provider paged, a one time order for 10 mg Oxycodone given with some relief. TF at goal rate 45 mL/hr via J tube. Full liquid diet. Last , 2 units given. G tube clamped. Last BM on 8/1. Scheduled Miralax and Senna given. Voiding spontaneously. Urine sample send to lab. Right PIV TKO. Continue with POC.     /71 (BP Location: Right arm)   Pulse 77   Temp 97.7  F (36.5  C) (Oral)   Resp 18   Wt 45.8 kg (101 lb)   LMP  (LMP Unknown)   SpO2 98%   BMI 18.47 kg/m

## 2025-08-03 NOTE — PROGRESS NOTES
Hospital Medicine  VOCERA message from CARLOS Cooper - patient having 9/10 abdomen and back pain.  Primary provider recommends no further opiates.    Plan:  Because of severe pain, will order only 1X oxycodone 10mg PO - then await further opiate plans from primary provider here inpatient - on Hospital Medicine service.    Francisco Hirsch MD

## 2025-08-03 NOTE — PROGRESS NOTES
Care Management Discharge Note    Discharge Date: 08/03/2025       Discharge Disposition: DME, Home, Home Infusion, Home Care    Discharge Services: Home Care    Discharge DME: None    Discharge Transportation: family or friend will provide    Private pay costs discussed: Not applicable    Does the patient's insurance plan have a 3 day qualifying hospital stay waiver?  No    PAS Confirmation Code:    Patient/family educated on Medicare website which has current facility and service quality ratings: no    Education Provided on the Discharge Plan: Yes  Persons Notified of Discharge Plans: Yes  Patient/Family in Agreement with the Plan: yes    Handoff Referral Completed: Yes, [facility] PCP: Internal handoff referral completed    Additional Information:  RNCC informed pt was medically ready for discharge. Informed [facility] patient was ready for discharge. Confirmed patient was okay to discharge home, and resume TF with [facility].     [facility]   Phone  [phone]  Fax  [phone]     [facility]  Phone # [phone]  Fax # [phone]     Aggie Meraz V RN  Weekend Covering Nurse Care Coordinator  [facility] Acute Care Management  Searchable on Vocera: 7A SOT RNCC, 7B Med Surg RNCC

## 2025-08-03 NOTE — DISCHARGE INSTRUCTIONS
Diabetes Management Discharge Plan      Recommendations for Discharge:                  -blood glucose monitoring three times daily before meals and at bedtime, resume Dexcom sensor use.  -lantus insulin 12 units daily in the morning at 9 am  - NPH insulin 12 units around 9 am for tube feeding and 12 units around 9pm for tube feeding. If tube feeding stopped or not infusing then do not give this insulin  -novolog insulin: 1 unit for every 10 grams of carbs with meals and snacks plus correction insulin per scale below:                 Meal time sliding scale insulin:  If -199 give 1 unit  If -249 give 2 units  If -299 give 3 units  If -349 give 4 units  If BG greater than 350 give 5 units     Bedtime sliding scale insulin:              If -249 give 1 unit  If -299 give 2 units  If -349 give 3 units  If BG greater than 350 give 4 units    Outpatient Diabetes Follow-Up: Follow up with your Primary Care Provider (PCP) in 1-2 weeks, bring glucose data to your appointment. Follow up ASAP with Diabetes Educator, Libby Jay RN. You have follow up with Dr. Maher (Endocrinology on 8/28/25).  You can call the [facility] at [phone] if you have scheduling questions or do not hear from them within a few days of discharge. Follow up sooner if blood glucose runs consistently greater than 200 mg/dL or if having more than two episodes less than 70 mg/dL.     If you have urgent questions or concerns regarding your blood sugars or insulin, you may contact [phone] (the main hospital ). Ask to speak with the Endocrinologist on call.     Your target A1c value is less than 7% to help prevent future complications from diabetes. Your most recent A1c is 18.3.      Thank you for letting the Diabetes Management Team be involved in your care!      Hypoglycemia (Low Blood Glucose) Management:  Signs/symptoms:  Shaking, sweating, fast heartbeat  Feeling dizzy, tired,  or weak   Feeling anxious and easy to irritate  Feeling nervous, crabby, or confused  Hunger  Vision problems, headache  Numb or tingling mouth    If blood glucose is 51 to 70:   Eat or drink 15 grams of carbohydrate. Examples:   1/2 cup (4 ounces) apple juice or regular soda pop, or   1 cup (8 ounces) milk, or   15 skittles, or   3 to 4 glucose tablets.   Re-check your blood glucose in 15 minutes.   Repeat these steps every 15 minutes until your blood glucose is above 80.       If blood glucose is 50 or less:   Eat or drink 30 grams of carbohydrate. Examples:   1 cup (8 ounces) apple juice or regular soda pop, or   2 cups (16 ounces) milk, or   1 banana, or   6 to 8 glucose tablets.   Re-check your blood glucose in 15 minutes.   Repeat these steps every 15 minutes until your blood glucose is above 80.

## 2025-08-03 NOTE — PROGRESS NOTES
Inpatient Diabetes Management Service: Daily Progress Note     HPI: Chantell Kidd is a 59 year old female admitted on 5/15/2025. 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 S/p IR consultation for replacement, though with closed track, prompting admission to Medicine Observation for GI consultation for new GJ-tube. . 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 Poor control  (A1c 18.3 %  8/1/25, Hgb 11.9)   Enteral Feed/Steroid induced Hyperglycemia  Acute on chronic Abdominal pain  GJ tube malfunction - GJ tube replaced this admission.    Plan/Recommendations:     ** patient has T1DM - requires insulin, basal dose must not to be withheld entirely OR for prolonged periods, high risk for DKA**   - Increase Lantus 6---10  units every 24 hours at 0900 (give even if NPO)   - Increase NPH 5---10 units at 9 am to cover KF TF running at goal 45 ml/hour and  or greater. Hold NPH if TF held   - Increase NPH 10 ---12 units at 9 pm to cover KF TF running at 45 ml/hour and  or greater. Hold NPH if TF held or not started.     - Increase Novolog Meal Coverage: 1 units per 15 ---->12 g for breakfast, increase to 1 per 10g starting with lunch then TID AC and 1:15  PRN with snacks/supplements when she is no longer NPO  - BG monitoring:  every 4 hours while on TF and minimal oral intake.   - Continue medium correction scale every 4 hours with minimal oral intake and continuous TF for BG <140  - PRN D10 at 50 ml/hr - Start if TF stopped or interrupted AND long-acting insulin on  board to replace 75% cho of TF to avoid severe hypoglycemia   Stop 12 hours after last NPH (When TF running at 25/hr)   - Hypoglycemia protocol    - Carb counting protocol      Discussion:     BG elevated during the day yesterday with the increase in TF rate and restarting her hydrocortisone. TF increased to goal at 0100 and patient tolerating. Needing an additional 5 units overnight. PTA Lantus dose is 12 units. Will increase Lantus dose to 10 units daily. Will increase NPH to 10 units this am with TF at goal.  Did not eat yesterday as patient was on a CLD. Increase to regular diet today.  Will increase 1 per 12 carb ratio as historically BG elevated with oral intake. BG elevated after breakfast. Will increase to 1 per 10 carb ratio for discharge.       Carried over:    BGs running very different at home. Reported she may be running TF rate differently at home. She thinks rate is 40/hr at goal rate, here she is 45/hr. Elevated BGs may also be related to uncovered carbs at home.  Saw Diabetes educator in April 2025 and patient was able to demonstrate carb ratio and correction scale. Reviewed with patient increase in carb ratio at discharge.     Diabetes Management Discharge Plan  Instructions to patient were posted in AVS and discussed on day of discharge.   Medications and supplies are to be ordered by primary service on discharge.   *please use the DIAB non-branded discharge supply order set ([id number])*    Patient will need the following supplies prescribed:   Does not need any supplies     Recommendations for Discharge:                  -blood glucose monitoring three times daily before meals and at bedtime, resume Dexcom sensor use.  -lantus insulin 12 units daily in the morning  - NPH insulin 12 units around 9 am for tube feeding and 12 units around 9pm for tube feeding. If tube feeding stopped or not infusing then do not give this insulin  -novolog insulin: 1 unit for every 10 grams of carbs with meals and  snacks plus correction insulin per scale below:                 Meal time sliding scale insulin:  If -199 give 1 unit  If -249 give 2 units  If -299 give 3 units  If -349 give 4 units  If BG greater than 350 give 5 units     Bedtime sliding scale insulin:              If -249 give 1 unit  If -299 give 2 units  If -349 give 3 units  If BG greater than 350 give 4 units       Outpatient Diabetes Follow-Up: Follow up with your Primary Care Provider (PCP) in 1-2 weeks, bring glucose data to your appointment. Follow up ASAP with Diabetes Educator, Libby Jay RN (a referral was sent). Has follow up with Dr. Maher (Endocrinology on 8/21/25) . You can call the [facility] at [phone] if you have scheduling questions or do not hear from them within a few days of discharge. Follow up sooner if blood glucose runs consistently greater than 200 mg/dL or if having more than two episodes less than 70 mg/dL.     If you have urgent questions or concerns regarding your blood sugars or insulin, you may contact [phone] (the main hospital ). Ask to speak with the Endocrinologist on call.     Your target A1c value is less than 7% to help prevent future complications from diabetes. Your most recent A1c is 18.3.      Thank you for letting the Diabetes Management Team be involved in your care!      Hypoglycemia (Low Blood Glucose) Management:  Signs/symptoms:  Shaking, sweating, fast heartbeat  Feeling dizzy, tired, or weak   Feeling anxious and easy to irritate  Feeling nervous, crabby, or confused  Hunger  Vision problems, headache  Numb or tingling mouth    If blood glucose is 51 to 70:   Eat or drink 15 grams of carbohydrate. Examples:   1/2 cup (4 ounces) apple juice or regular soda pop, or   1 cup (8 ounces) milk, or   15 skittles, or   3 to 4 glucose tablets.   Re-check your blood glucose in 15 minutes.   Repeat these steps every 15 minutes until your blood  glucose is above 80.       If blood glucose is 50 or less:   Eat or drink 30 grams of carbohydrate. Examples:   1 cup (8 ounces) apple juice or regular soda pop, or   2 cups (16 ounces) milk, or   1 banana, or   6 to 8 glucose tablets.   Re-check your blood glucose in 15 minutes.   Repeat these steps every 15 minutes until your blood glucose is above 80.              Discharge Planning: (tentative)    Medications: Lantus + NPH + Carb ratio + Correction scale.   Test Claims: TBD none needed.   Education: Anastasiia Bean saw her on 4/21/2025  May need a refresh prior to discharge.   Outpatient Follow-up:  recommend [facility] End[facility]: Follow up ASAP with Libby VERONICA, CARLOS and has follow up with Dr. Maher (Endocrinology on 8/21/25)        Please notify Inpatient Diabetes Service if changes are planned to steroids, nutrition, TPN/TF and anticipated procedures requiring prolonged NPO status.         Interval History/Review of Systems :   The last 24 hours progress and nursing notes reviewed.  Worsening pain overnight  Has not eaten. SLP to see patient d/t difficulties swallowing.     Planned Procedures/Surgeries: none    Inpatient Glucose Control:       Recent Labs   Lab 08/03/25  0202 08/02/25  2218 08/02/25  1706 08/02/25  1428 08/02/25  0958 08/02/25  0653   * 243* 243* 234* 257* 179*             Medications Impacting Glycemia:   Steroids: Methypred 8 mg IV x 1 at 1500 (to replace PTA hydrocortisone 10 mg am, 15 mg HS (adrenal insufficiency)   D5W containing solutions/medications:    Other medications impacting glucose: none        Nutrition:   Orders Placed This Encounter      Clear Liquid Diet    TF: 8/1: [employer] started at 15/hr tonight for 8 hours , Will  advancing by 10mL q 8 hours to PTA goal of Polyplus-transfection Peptide 1.5 (or equivalent) @ 45 mL/hr (1080 mL/day) to provide 1661 kcal, 80 g protein, 149 g CHO   8/2 am Polyplus-transfection Peptide 1.5 advanced to 25/ml an hour 9 am- 5pm. At 5 pm, advance to  35/hr until 1 am, advance to 45ml/hr. 149 g CHO   TPN: none        Diabetes History: see full consult note for complete diabetes history   Diabetes Type and Duration: Pancreatogenic diabetes s/p total pancreatectomy and islet autotransplant with insulin dependence since 1/2012  GAD65 antibody, zinc transporter 8 antibody, islet antibody, insulin antibody not available on epic search.     Numerous C-peptides:  Component      Latest Ref Rng 8/28/2018  6:47 AM 9/3/2018  6:41 PM 9/6/2018  8:00 AM 9/7/2018  6:55 AM 4/18/2019  11:04 AM 4/3/2025  2:01 PM   C-Peptide      0.9 - 6.9 ng/mL 0.3 (L)  0.2 (L)  1.8  2.8  1.8  0.5 (L)    Patient Fasting?           Yes      PTA Medication Regimen:   She says she was taking what her sheet said that she given on last discharge:  She was discharged on:     Long-acting insulin: glargine (Lantus) - take 10 units once daily at 6pm. Take at same time each day.     2) Rapid-acting insulin: aspart (Novolog) carbohydrate coverage/mealtime insulin - take 1 unit per 12 grams of carbohydrates before meals and snacks.     3) Rapid-acting insulin: aspart (Novolog) correction - see chart below. Take for high blood glucoses three times daily before meals when eating (or every 4-6 hours when receiving tube feeding) and at bedtime.  You may add the correction dose to the carbohydrate coverage/mealtime insulin dose and give in one injection--ideally 10-15 minutes before a meal.  You may take the correction dose even if you skip a meal (as long as it has been 4 hours since previous correction dose).     Meal time sliding scale insulin:  If -199 give 1 unit  If -249 give 2 units  If -299 give 3 units  If -349 give 4 units  If BG greater than 350 give 5 units     Bedtime sliding scale insulin:              If -249 give 1 unit  If -299 give 2 units  If -349 give 3 units  If BG greater than 350 give 4 units        4) Intermediate-acting insulin: Novolin N (NPH).  "Take to cover tube feeds (dosed for Sagr[place] Marinelli at 45 ml/hr)   - Take Novolin N (NPH) 10 units at 9AM   - Take Novolin N (NPH) 10 units at 9PM      6/5/25: Seen by the diabetes educator was recommended to increase Lantus 12 units in am and 12 units in the PM Reported to the diabetes educator she was taking Lantus 10 units twice a day. 1 per 12 carb ratio and NPH 8 units in am . Reported her blood sugars had been running high.      6/26/25: Was seen by her PCP: Her BG at this appointemnt was in the 700's  Will give her an additional 10 units to this will increase her Lantus insulin to 20 units twice a day if she is still high in the morning with an additional 10 units of NovoLog in the morning      7/26/25: Did not make any changes to her insulin regimen. Recommended to follow up with endocrinologist and diabetes educator.      Had previously been on Omnipod while in [place] and had been well continued on this.         Glucose monitoring device/frequency/trends: Dexcom was put on her on 5/13/2025 by diabetes ed       Checks BG during the day every 4 hours. BG readings are 500 or just reads \"high\"            Physical Exam:   /74   Pulse 84   Temp 98.3  F (36.8  C) (Oral)   Resp 18   Wt 45.8 kg (101 lb)   LMP  (LMP Unknown)   SpO2 97%   BMI 18.47 kg/m    General:  well appearing, in no acute distress.  HEENT:  NC/AT.   Lungs:  unremarkable  ABD:  rounded, soft, non-tender  Skin:  warm and dry, no obvious lesions  MSK:   moving all extremities  Mental Status:  Alert and oriented x3  Psych:   Cooperative, good eye contact, full/appropriate affect           Data:     Lab Results   Component Value Date    A1C 18.3 (H) 08/01/2025    A1C 15.8 (H) 04/30/2025    A1C 19.1 (H) 04/15/2025    A1C 18.9 (H) 04/03/2025    A1C 17.1 (H) 01/23/2025    A1C 7.3 (H) 11/09/2020    A1C 6.7 (A) 11/21/2019    A1C 8.2 (H) 06/11/2019    A1C Canceled, Test credited 06/10/2019    A1C 9.6 (H) 04/18/2019       ROUTINE IP LABS (Last four " results)  BMP  Recent Labs   Lab 08/03/25  0202 08/02/25  2218 08/02/25  1706 08/02/25  1428 08/02/25  0653 08/02/25  0549 08/01/25  1716 08/01/25  1532 08/01/25  0832 08/01/25  0603 07/31/25  1714 07/31/25  1509   NA  --   --   --   --   --  135  --   --   --  139  --  132*   POTASSIUM  --   --   --   --   --  3.8  --  3.6  --  2.9*  --  4.5   CHLORIDE  --   --   --   --   --  101  --   --   --  104  --  92*   ELIZA  --   --   --   --   --  8.1*  --   --   --  8.3*  --  9.5   CO2  --   --   --   --   --  24  --   --   --  28  --  28   BUN  --   --   --   --   --  3.5*  --   --   --  3.4*  --  4.3*   CR  --   --   --   --   --  0.30*  --   --   --  0.30*  --  0.39*   * 243* 243* 234*   < > 214*   < >  --    < > 233*   < > 699*  732*    < > = values in this interval not displayed.     CBC  Recent Labs   Lab 08/02/25  0549 08/01/25  0603 07/31/25  1509   WBC 7.8 6.6 5.1   RBC 3.96 3.75* 4.25   HGB 11.9 11.2* 12.6   HCT 35.2 33.2* 37.4   MCV 89 89 88   MCH 30.1 29.9 29.6   MCHC 33.8 33.7 33.7   RDW 11.4 11.2 11.2    273 299     INR  Recent Labs   Lab 08/01/25  0603 07/31/25  1509   INR 1.09 0.97       Inpatient Diabetes Service will continue to follow, please don't hesitate to contact the team with any questions or concerns.     BEE Felix CNP    Plan discussed with patient, bedside RN, and primary team via this note.    To contact Inpatient Diabetes Service:     7 AM - 5 PM: Page the IDS DAVID following the patient that day (see filed or incomplete progress notes/consult notes under Endocrinology)    OR if uncertain of provider assignment: page job code [phone]    5 PM - 7 AM: First call after hours is to primary service.    For urgent after-hours questions, page job code for on call fellow: [phone]     I spent a total of 45 minutes on the date of the encounter doing prep/post-work, chart review, history and exam, documentation and further activities per the note including lab review, multidisciplinary  communication, counseling the patient and/or coordinating care regarding acute hyper/hypoglycemic management, as well as discharge management and planning/communication.

## 2025-08-03 NOTE — DISCHARGE SUMMARY
[facility]  Hospitalist Discharge Summary      Date of Admission:  7/31/2025  Date of Discharge:  8/3/2025  Discharging Provider: Wilmer Sadler PA-C  Discharge Service: Hospitalist Service, GOLD TEAM 6    Discharge Diagnoses   PEJ tube malfunction (s/p endoscopic PEJ placement 8/1/25)  Malnutrition s/p RNY and PEG & PEJ tubes  Dysphagia, solid foods  Acute on chronic abdominal pain  Constipation  Left knee pain  History chronic pancreatitis s/p TPAIT  Post-pancreatectomy type 1 diabetes mellitus  Adrenal insufficiency  Hypothyroidism  MDD, anxiety, insomnia  Hypokalemia, resolved    Clinically Significant Risk Factors     # Severe Malnutrition: based on nutrition assessment and treatment provided per dietitian's recommendations.      Follow-ups Needed After Discharge   Follow-up Appointments       Hospital Follow-up with Existing Primary Care Provider (PCP)          Schedule Primary Care visit within: 14 Days             Unresulted Labs Ordered in the Past 30 Days of this Admission       Date and Time Order Name Status Description    8/1/2025 11:46 AM Blood Culture Peripheral blood (BC) Hand, Right Preliminary     8/1/2025 11:46 AM Blood Culture Peripheral blood (BC) Hand, Right Preliminary         These results will be followed up by Hospital Medicine pool    Discharge Disposition   Discharged to home  Condition at discharge: Stable    Hospital Course   Chantell Kidd is a 59 year old female admitted on 7/31/2025. She has PMH of insulin-dependent DM following TPAIT (1/2012), migraine, hypothyroidism, adrenal insufficiency, and RNY with venting PEG and PEJ tube for nutrition who presented to the ED for evaluation of PEJ malfunction. Patient reported PEJ became dislodged 4 days prior to arrival. IR was consulted for replacement, however the tract had closed prompting admission to Medicine Observation for GI consultation for new GJ-tube. Patient underwent endoscopy with  PEJ replacement. Tube feeds restarted without difficulty. Endocrine was consulted to assist with insulin regimen. Patient noted worsening of chronic abdominal pain post-procedure. No acute findings noted on work up. Suspect post-procedure pain. Moderate to large stool burden noted on imaging, therefore constipation likely also contributing. Patient was otherwise medically stable for discharge home.  See below for further details.         # PEJ tube malfunction  # Malnutrition s/p RNY and PEG & PEJ tubes:  Patient presents with PEJ that has been dislodged. Attempt at IR replacement- unsuccessful. J tube fell out ~ 4 days ago and patient has not had TF. Does tolerate PO intake and had been eating and drinking small amounts. PTA- continuous TF.   - GI consulted s/p PEJ placement on 8/1  - RD consulted, tube feeds advanced to goal rate, tolerated well  - Resume home infusion services    # Dysphagia  Reports dysphagia with solids. OK with liquids. No aspiration. No pathology noted on EGD. Less likely structural. SLP consulted however unable to assess patient over weekend. No issues noted upon diet advancement.  - Outpatient SLP referral     # Acute on Chronic abdominal pain: PTA Creon, famotidine, protonix, Linzess, reglan and sucralfate. Imaging w/ large stool burden. Benign abdominal exam. No nausea and vomiting. Suspect related to constipation. Patient reports having BM following enema yesterday. Now having increased RUQ and R flank pain today. Requesting IV morphine (scheduled) for better pain control. No other GI or urinary complaints. Afebrile. WBC normal. Hx cholecystectomy- LFTs normal. UA negative. Imaging showed moderate to severe stool burden. Pain likely related to recent procedures, +/- constipation.  - Continue bowel regimen  - Oxycodone 5mg q6h prn     # Left knee pain: Acute onset in past 2-3 days. NO reports of trauma. No swelling.  Knee xray negative   - Orthopedic referral      # Post-pancreatectomy DM  Type 1, poorly controlled  # Chronic Pancreatitis and Sphincter of Oddi dysfunction s/p TPAIT (1/2012)  Patient has poorly controlled diabetes with multiple presentations with glucose >700. Pesented with BG of 732. HbA1c of 15.8 (6/2025). No evidence of DKA. Patient denies medication non-compliance. Endocrine consulted to assist with insulin regimen.   - Lantus 12 units daily  - NPH 12 unit BID  - Carb coverage (1/10 with meals, 1/15 with snacks)  - Sliding scale   - Follow up with diabetic education and endocrine team  - PTA Creon 96,000U TID with meals             # Adrenal insufficiency: PTA hydrocortisone.   - Continue PTA hydrocortisone 10mg in the AM and 15mg in the PM      # Hypothyroidism: PTA Synthroid  - Continue PTA Synthroid     # MDD  # Anxiety  # Insomnia: PTA duloxetine, trazodone  - Continue PTA duloxetine, trazodone    # Hypokalemia  - RN replacment protocols (mg,K, phos)    Consultations This Hospital Stay   INTERVENTIONAL RADIOLOGY ADULT/PEDS IP CONSULT  CONSULT FOR INPATIENT VASCULAR ACCESS CARE  GI PANCREATICOBILIARY ADULT IP CONSULT  PHYSICAL THERAPY ADULT IP CONSULT  ENDOCRINE DIABETES ADULT IP CONSULT  CARE MANAGEMENT / SOCIAL WORK IP CONSULT  NUTRITION SERVICES ADULT IP CONSULT  NUTRITION SERVICES ADULT IP CONSULT  PHARMACY IP CONSULT  CARE MANAGEMENT / SOCIAL WORK IP CONSULT    Code Status   Full Code    Time Spent on this Encounter   IWilmer PA-C, personally saw the patient today and spent greater than 30 minutes discharging this patient.       Wilmer Sadler PA-C  [facility] [address] [address]  Phone: [phone]  ______________________________________________________________________    Physical Exam   Vital Signs: Temp: 97.7  F (36.5  C) Temp src: Oral BP: 99/72 Pulse: 96   Resp: 18 SpO2: 95 % O2 Device: None (Room air)    Weight: 96 lbs 11.2 oz  General Appearance:  Awake. Alert. Oriented x3. NAD.   HEENT:  Unremarkable.    Respiratory:  Normal effort. CTAB. No wheezing, rhonchi, rales.   Cardiovascular:  RRR. S1/S2. No murmurs.   GI:  Soft, non-distended, tender throughout, no guarding or rebound. PEG and PEJ present. +BS.   Extremity:  No pitting edema.   Skin:  No visible rash.   Neuro:  Grossly non-focal.         Primary Care Physician   Ron Morgan    Discharge Orders      Primary Care - Care Coordination Referral      Adult Diabetes Education  Referral      Speech Therapy  Referral      Reason for your hospital stay    Admitted to [facility] for dislodged J tube requiring GI consulted for endoscopic replacement.     Activity    Your activity upon discharge: activity as tolerated     Tubes and Drains    Current Tubes and Drains:     Drain  Duration           GI Enteral Access Device LUQ Gastrostomy 2 days    GI Enteral Access Device LLQ Jejunostomy 14 fr 1 day              Resume Home Care Services     Discharge Instructions    Correction Scale - MEDIUM INSULIN RESISTANCE DOSING     Do Not give Correction Insulin if Pre-Meal BG less than 140.   For Pre-Meal  - 189 give 1 unit.   For Pre-Meal  - 239 give 2 units.   For Pre-Meal  - 289 give 3 units.   For Pre-Meal  - 339 give 4 units.   For Pre-Meal - 389 give 5 units.   For Pre-Meal -439 give 6 units   For Pre-Meal BG greater than or equal to 440 give 7 units.     Diet    Follow this diet upon discharge:       Adult Formula Drip Feeding: Continuous [place] Peptide 1.5; Jejunostomy; Goal Rate: 45;       Regular Diet Adult    Free water order:  Free water - Feeding Tube Flush Frequency: Q 4 [id number] every 4 hours (900mL daily)   Free water volume: Other;150mL every 4 hours (900mL daily)   Additional Free water: None     Hospital Follow-up with Existing Primary Care Provider (PCP)            Significant Results and Procedures   Most Recent 3 CBC's:  Recent Labs   Lab Test 08/03/25  0618 08/02/25  0549 08/01/25  0603    WBC 6.5 7.8 6.6   HGB 12.1 11.9 11.2*   MCV 88 89 89    264 273     Most Recent 3 BMP's:  Recent Labs   Lab Test 08/03/25  1408 08/03/25  1002 08/03/25  0656 08/03/25  0618 08/02/25  0653 08/02/25  0549 08/01/25  1716 08/01/25  1532 08/01/25  0832 08/01/25  0603   NA  --   --   --  136  --  135  --   --   --  139   POTASSIUM  --   --   --  3.8  --  3.8  --  3.6  --  2.9*   CHLORIDE  --   --   --  102  --  101  --   --   --  104   CO2  --   --   --  26  --  24  --   --   --  28   BUN  --   --   --  4.6*  --  3.5*  --   --   --  3.4*   CR  --   --   --  0.35*  --  0.30*  --   --   --  0.30*   ANIONGAP  --   --   --  8  --  10  --   --   --  7   ELIZA  --   --   --  8.7*  --  8.1*  --   --   --  8.3*   * 273* 230* 273*   < > 214*   < >  --    < > 233*    < > = values in this interval not displayed.     Most Recent 2 LFT's:  Recent Labs   Lab Test 08/02/25  0549 07/31/25  1509 06/18/25  1233 06/09/25  1556   AST 50*  --   --  224*   ALT 55*  --  78* 340*   ALKPHOS 119 170* 140 214*   BILITOTAL 0.2 0.2 0.2 0.2       Discharge Medications      Review of your medicines        CHANGE how you take these medications        Dose / Directions   * insulin aspart 100 UNIT/ML pen  Commonly known as: NovoLOG PEN  This may have changed: Another medication with the same name was added. Make sure you understand how and when to take each.  Used for: Exocrine pancreatic insufficiency      Continue Novolog Meal Coverage: 1 unit per 12 grams CHO, TID AC and 1:15 PRN with snacks/supplements - Continue Novolog Correction Scale: 1:75>150 TID AC, HS, 0200 at 1400  Quantity: 15 mL  Refills: 11     * insulin aspart 100 UNIT/ML pen  Commonly known as: NovoLOG PEN  This may have changed: Another medication with the same name was added. Make sure you understand how and when to take each.  Used for: Type 1 diabetes mellitus with hypoglycemia and without coma (H)      -novolog insulin: 1 unit for every 12 grams of carbs with meals and  snacks plus correction insulin per scale below: Meal time sliding scale insulin:If -199 give 1 unit If -249 give 2 units If -299 give 3 units If -349 give 4 units If BG greater than 350 give 5 units, Bedtime sliding scale insulin: If -249 give 1 unit If -299 give 2 units If -349 give 3 units If BG greater than 350 give 4 units  Quantity: 15 mL  Refills: 0     * insulin aspart 100 UNIT/ML pen  Commonly known as: NovoLOG PEN  This may have changed: You were already taking a medication with the same name, and this prescription was added. Make sure you understand how and when to take each.  Used for: Type 1 diabetes mellitus with hypoglycemia and without coma (H)      Dose = 1 units per 15 grams of carbohydrate with snacks  Quantity: 15 mL  Refills: 0     * insulin aspart 100 UNIT/ML pen  Commonly known as: NovoLOG PEN  This may have changed: You were already taking a medication with the same name, and this prescription was added. Make sure you understand how and when to take each.  Used for: Type 1 diabetes mellitus with hypoglycemia and without coma (H)      Dose = 1 units per 10 grams of carbohydrate with meals  Quantity: 15 mL  Refills: 0     * insulin aspart 100 UNIT/ML pen  Commonly known as: NovoLOG PEN  This may have changed: You were already taking a medication with the same name, and this prescription was added. Make sure you understand how and when to take each.  Used for: Type 1 diabetes mellitus with hypoglycemia and without coma (H)      Dose: 1-7 Units  Inject 1-7 Units subcutaneously every 4 hours.  Quantity: 15 mL  Refills: 0     insulin glargine 100 UNIT/ML pen  Commonly known as: LANTUS PEN  This may have changed: how much to take  Used for: Type 1 diabetes mellitus with hypoglycemia and without coma (H)      Dose: 12 Units  Inject 12 Units subcutaneously every 24 hours.  Quantity: 15 mL  Refills: 0     insulin  UNIT/ML injection  This may have changed:  how much to take  Used for: Major depressive disorder, recurrent episode, moderate (H)      Dose: 12 Units  Inject 12 Units subcutaneously 2 times daily.  Quantity: 15 mL  Refills: 0     oxyCODONE 5 MG tablet  Commonly known as: ROXICODONE  This may have changed:   how much to take  how to take this  reasons to take this  additional instructions  Used for: Jejunostomy tube fell out      Dose: 5 mg  Take 1 tablet (5 mg) by mouth or Feeding Tube every 6 hours as needed for moderate pain.  Quantity: 12 tablet  Refills: 0           * This list has 5 medication(s) that are the same as other medications prescribed for you. Read the directions carefully, and ask your doctor or other care provider to review them with you.                CONTINUE these medicines which have NOT CHANGED        Dose / Directions   acetaminophen 325 MG tablet  Commonly known as: TYLENOL      Dose: 975 mg  Take 3 tablets (975 mg) by mouth every 8 hours  Quantity:    Refills: 0     Alcohol Swabs Pads  Used for: Type 1 diabetes mellitus with hypoglycemia and without coma (H)      Use to swab the area of the injection or tiago as directed Per insurance coverage  Quantity: 200 each  Refills: 1     alendronate 70 MG tablet  Commonly known as: Fosamax  Used for: Age-related osteoporosis without current pathological fracture      Dose: 70 mg  Take 1 tablet (70 mg) by mouth every 7 days On Sundays take first thing in the morning with plain water and remain upright for at least 30 minutes and until after first food of the day    Do not restart Fosamax (alendronate) until your difficulty swallowing has resolved and you have finished the entire course of fluconazole (Diflucan).  Quantity: 4 tablet  Refills: 0     alum & mag hydroxide-simethicone 200-200-25 MG Chew chewable tablet  Commonly known as: MAALOX      Dose: 1 tablet  Take 1 tablet by mouth 3 times daily as needed for indigestion  Refills: 0     * Boost High Protein Liqd  Used for:  Post-pancreatectomy diabetes (H), Pancreatic insufficiency      After above baseline labs are drawn, give: 6 mL/kg to maximum of 360 mL; the beverage is to be consumed within 5 minutes.  Refills: 0     * Abroad101 Peptide 1.5 Liqd  Used for: Exocrine pancreatic insufficiency      Dose: 1,080 mL  Place 1,080 mLs into J tube daily. Infuse Abroad101 Peptide 1.5 @ 45 ml/hr into J-tube via pump  Water flush: 120 ml tid + an additional 550 ml through flushes or oral intake  Kcals: 1662  Cartons per day: 3.5  Quantity: 64030 mL  Refills: 11     Contour Next Test test strip  Used for: Post-pancreatectomy diabetes (H)  Generic drug: blood glucose      USE TO TEST BLOOD SUGAR 6 TIMES DAILY OR AS DIRECTED. Call clinic to schedule follow up appointment.  IMPORTANT  Quantity: 600 strip  Refills: 1     cyclobenzaprine 10 MG tablet  Commonly known as: FLEXERIL  Used for: Generalized abdominal pain      Dose: 10 mg  Take 1 tablet (10 mg) by mouth or Feeding Tube 3 times daily as needed for muscle spasms.  Quantity: 60 tablet  Refills: 0     Dexcom G7  June  Used for: Post-pancreatectomy diabetes (H)      Use to read blood sugars as per 's instructions.  Quantity: 1 each  Refills: 0     Dexcom G7 Sensor Misc  Used for: Post-pancreatectomy diabetes (H), [employer] [employer]plant-5,000 + IE/KG Pathology- fat necrosis and fatty infiltration, Hypoglycemia, Exocrine pancreatic insufficiency      Change every 10 days.  Quantity: 9 each  Refills: 5     diclofenac 1 % topical gel  Commonly known as: VOLTAREN  Used for: Left knee pain, unspecified chronicity      Dose: 2 g  Apply 2 g topically 4 times daily as needed for moderate pain.  Quantity: 50 g  Refills: 0     droNABinol 5 MG capsule  Commonly known as: MARINOL  Used for: Weight loss      Dose: 5 mg  Take 1 capsule (5 mg) by mouth 2 times daily (before meals).  Quantity: 60 capsule  Refills: 3     * DULoxetine 60 MG capsule  Commonly known as: CYMBALTA  Used for:  Major depressive disorder, recurrent episode, moderate, CIERRA (generalized anxiety disorder)      Dose: 60 mg  Take 1 capsule (60 mg) by mouth daily With 30mg capsule for total dose of 90mg  Quantity: 90 capsule  Refills: 1     * DULoxetine 30 MG capsule  Commonly known as: CYMBALTA  Used for: Major depressive disorder, recurrent episode, moderate (H), CIERRA (generalized anxiety disorder)      Dose: 30 mg  Take 1 capsule (30 mg) by mouth daily With 60mg capsule for total dose of 90mg  Quantity: 90 capsule  Refills: 0     famotidine 20 MG tablet  Commonly known as: PEPCID  Used for: Gastroesophageal reflux disease without esophagitis      Dose: 20 mg  Take 1 tablet (20 mg) by mouth At Bedtime  Quantity: 30 tablet  Refills: 0     furosemide 40 MG tablet  Commonly known as: LASIX  Used for: RUQ abdominal pain      Dose: 40 mg  Take 1 tablet (40 mg) by mouth daily.  Quantity: 30 tablet  Refills: 0     gabapentin 400 MG capsule  Commonly known as: NEURONTIN  Used for: RUQ abdominal pain, Exocrine pancreatic insufficiency      Take 1 tablet (400mg) twice a day and then an additional 2 tablets (800mg) at bedtime.  Quantity: 1080 capsule  Refills: 3     glycerin 2 g suppository  Commonly known as: ADULT  Used for: Obstipation      Dose: 1 suppository  Place 1 suppository rectally daily as needed for constipation.  Refills: 0     Gvoke HypoPen 2-Pack 1 MG/0.2ML pen  Used for: Type 1 diabetes mellitus with hypoglycemia and without coma (H)  Generic drug: Glucagon      Inject the contents of 1 device under the skin into lower abdomen, outer thigh, or outer upper arm as needed for hypoglycemia. If no response after 15 minutes, additional 1 mg dose from a new device may be injected while waiting for emergency assistance.  Quantity: 0.4 mL  Refills: 0     * hydrocortisone 5 MG tablet  Commonly known as: CORTEF      Dose: 5 mg  Take 5 mg by mouth At Bedtime along with a 10 mg tablet for a total dose of 15 mg  Refills: 0     *  hydrocortisone 10 MG tablet  Commonly known as: CORTEF      Take 10 mg in the morning and 10 mg along with a 5 mg tablet at bedtime for a total dose of 15 mg at bedtime. Watch for hypoglycemia recurrence.  Refills: 0     hydrocortisone sodium succinate  mg/2 mL injection  Commonly known as: solu-CORTEF  Used for: Adrenal insufficiency      Inject 100mg (1 mL) into muscle in emergency or unable to take oral hydrocortisone. Go to emergency room if given. [facility] [id number]. Note to pharmacy: Provide two 3ml syringes with 23g 1 inch needles.  Quantity: 1 mL  Refills: 0     Injection Device for insulin June  Commonly known as: NOVOPEN ECHO  Used for: Post-pancreatectomy diabetes (H)      Use with   Insulin  Quantity: 1 each  Refills: 0     Ketone Test Strp  Used for: Type 1 diabetes mellitus with hypoglycemia and without coma (H)      Dose: 1 each  1 each by In Vitro route as needed (hyperglycemia)  Quantity: 50 strip  Refills: 0     levothyroxine 125 MCG tablet  Commonly known as: SYNTHROID/LEVOTHROID  Used for: Hypothyroidism, unspecified type      Dose: 125 mcg  Take 1 tablet (125 mcg) by mouth every morning (before breakfast).  Quantity: 30 tablet  Refills: 0     linaclotide 145 MCG capsule  Commonly known as: LINZESS  Used for: Gastroparesis      Dose: 145 mcg  Take 1 capsule (145 mcg) by mouth every morning (before breakfast). as needed  Quantity: 30 capsule  Refills: 0     lipase-protease-amylase [id number] units Cpep  Commonly known as: CREON 12  Used for: Exocrine pancreatic insufficiency      Dose: 8 capsule  Take 8 capsules by mouth 3 times daily (with meals). 6 capsules with snacks  Refills: 0     metoclopramide 5 MG tablet  Commonly known as: REGLAN  Used for: Nausea      Dose: 10 mg  Take 2 tablets (10 mg) by mouth 3 times daily.  Quantity: 90 tablet  Refills: 0     naloxone 4 MG/0.1ML nasal spray  Commonly known as: NARCAN  Used for: Chronic, continuous use of opioids      Dose: 4  mg  Spray 1 spray (4 mg) into one nostril alternating nostrils as needed for opioid reversal. every 2-3 minutes until assistance arrives  Quantity: 2 each  Refills: 0     Omnipod 5 Intro (Gen 5) Kit  Used for: Post-pancreatectomy diabetes (H)      Dose: 1 each  1 each See Admin Instructions Use continuously to infuse insulin.  Quantity: 1 kit  Refills: 0     ondansetron 4 MG tablet  Commonly known as: ZOFRAN  Used for: Nausea      Dose: 4 mg  Take 1 tablet (4 mg) by mouth every 8 hours as needed for nausea.  Quantity: 20 tablet  Refills: 0     pantoprazole 40 MG EC tablet  Commonly known as: PROTONIX  Used for: H. pylori infection      Dose: 40 mg  Take 1 tablet (40 mg) by mouth 2 times daily.  Quantity: 60 tablet  Refills: 0     Pip Pen Needles 32G x 4MM 32G X 4 MM miscellaneous  Used for: Post-pancreatectomy diabetes (H)  Generic drug: insulin pen needle      Use 6 pen needles daily or as directed.  Quantity: 200 each  Refills: 5     polyethylene glycol 17 GM/Dose powder  Commonly known as: MIRALAX  Used for: Chronic constipation      Dose: 17 g  Take 17 g by mouth 3 times daily.  Quantity: 510 g  Refills: 0     potassium chloride minerva ER 20 MEQ CR tablet  Commonly known as: KLOR-CON M20  Used for: Hypokalemia      Dose: 20 mEq  Take 1 tablet (20 mEq) by mouth daily  Quantity: 90 tablet  Refills: 1     Relizorb Device  Used for: Exocrine pancreatic insufficiency      Dose: 1 Device  Place 1 each (1 Device) into OG Tube 2 times daily. Administer as 1 cartridge every 12 hours  Quantity: 60 each  Refills: 11     senna-docusate 8.6-50 MG tablet  Commonly known as: SENOKOT-S/PERICOLACE  Used for: Obstipation      Dose: 2 tablet  Take 2 tablets by mouth 2 times daily.  Quantity: 160 tablet  Refills: 0     Sharps Container Misc  Used for: Type 1 diabetes mellitus with hypoglycemia and without coma (H)      Use as directed to dispose of needles, lancets and other sharps  Quantity: 1 each  Refills: 1     simethicone 80 MG  chewable tablet  Commonly known as: MYLICON  Used for: Abdominal bloating      Dose: 80 mg  Take 1 tablet (80 mg) by mouth 4 times daily.  Quantity: 160 tablet  Refills: 0     sodium chloride (PF) 0.9% PF flush  Used for: Dislodged gastrostomy tube      Dose: 10-40 mL  Inject 10-40 mLs into catheter every 8 hours. -- Flush J port and G port EACH with 30 ml water every 8 hours -- Flush J port with 30 ml water BEFORE AND AFTER medications to avoid clogging of this tube.  Quantity: 500 mL  Refills: 0     sodium chloride 0.65 % nasal spray  Commonly known as: OCEAN  Used for: Irritation of nose      Dose: 1 spray  Spray 1 spray into both nostrils daily as needed for congestion  Quantity: 30 mL  Refills: 0     sucralfate 1 GM tablet  Commonly known as: CARAFATE  Used for: Nausea      Dose: 1 g  Take 1 tablet (1 g) by mouth 4 times daily.  Quantity: 160 tablet  Refills: 0     SUMAtriptan 50 MG tablet  Commonly known as: Imitrex  Used for: Migraine      Dose: 50 mg  Take 1 tablet (50 mg) by mouth at onset of headache for migraine Take 1 Tab by mouth Once as needed for Migraine Headache. May repeat after two hours.  Maximum dose 200 mg/24 hours.  Quantity: 30 tablet  Refills: 1     thiamine 100 MG tablet  Commonly known as: B-1  Used for: RUQ abdominal pain      Dose: 100 mg  Place 1 tablet (100 mg) into Feeding Tube daily.  Quantity: 30 tablet  Refills: 0     topiramate 100 MG tablet  Commonly known as: TOPAMAX  Used for: Intractable chronic migraine without aura and without status migrainosus      Dose: 100 mg  Take 1 tablet (100 mg) by mouth 2 times daily.  Quantity: 50 tablet  Refills: 0     traZODone 150 MG tablet  Commonly known as: DESYREL  Used for: Primary insomnia, Constipation, unspecified constipation type, Chronic back pain, unspecified back location, unspecified back pain laterality, Physical deconditioning      Dose: 150 mg  Take 1 tablet (150 mg) by mouth at bedtime.  Quantity: 90 tablet  Refills: 3            * This list has 6 medication(s) that are the same as other medications prescribed for you. Read the directions carefully, and ask your doctor or other care provider to review them with you.                   Where to get your medicines        These medications were sent to [id number] - [place], [place] - [address]  [address], [facility] [address]      Hours: test Rx sent successfully 12/26/02  KR Phone: [phone]   insulin aspart 100 UNIT/ML pen  insulin aspart 100 UNIT/ML pen  insulin aspart 100 UNIT/ML pen  insulin glargine 100 UNIT/ML pen  insulin  UNIT/ML injection  oxyCODONE 5 MG tablet       Allergies   Allergies   Allergen Reactions    Corticosteroids Other (See Comments)     All oral, IV and injectable steroids are contraindicated (unless in life threatening situations) in [facility] transplant recipients. They can cause irreversible loss of islet cell function. Please contact patient's transplant care coordinator YURI Cortez RN at [phone]/pager [phone] and/or endocrinologist prior to administration.      Chocolate Flavoring Agent (Non-Screening) Rash     Breaks out when eats chocolate

## 2025-08-04 ENCOUNTER — PATIENT OUTREACH (OUTPATIENT)
Dept: CARE COORDINATION | Facility: CLINIC | Age: 60
End: 2025-08-04

## 2025-08-04 ENCOUNTER — ALLIED HEALTH/NURSE VISIT (OUTPATIENT)
Dept: EDUCATION SERVICES | Facility: CLINIC | Age: 60
End: 2025-08-04
Payer: MEDICARE

## 2025-08-04 DIAGNOSIS — Z90.410 POST-PANCREATECTOMY DIABETES (H): ICD-10-CM

## 2025-08-04 DIAGNOSIS — E13.9 POST-PANCREATECTOMY DIABETES (H): ICD-10-CM

## 2025-08-04 DIAGNOSIS — E89.1 POST-PANCREATECTOMY DIABETES (H): ICD-10-CM

## 2025-08-04 PROCEDURE — G0108 DIAB MANAGE TRN  PER INDIV: HCPCS | Performed by: DIETITIAN, REGISTERED

## 2025-08-04 RX ORDER — INSULIN PMP CART,AUT,G6/7,CNTR
1 EACH SUBCUTANEOUS
Qty: 30 EACH | Refills: 3 | Status: SHIPPED | OUTPATIENT
Start: 2025-08-04

## 2025-08-05 ENCOUNTER — PATIENT OUTREACH (OUTPATIENT)
Dept: CARE COORDINATION | Facility: CLINIC | Age: 60
End: 2025-08-05
Payer: MEDICARE

## 2025-08-05 ENCOUNTER — TELEPHONE (OUTPATIENT)
Dept: INTERNAL MEDICINE | Facility: CLINIC | Age: 60
End: 2025-08-05
Payer: MEDICARE

## 2025-08-06 ENCOUNTER — PATIENT OUTREACH (OUTPATIENT)
Dept: CARE COORDINATION | Facility: CLINIC | Age: 60
End: 2025-08-06
Payer: MEDICARE

## 2025-08-06 LAB
BACTERIA SPEC CULT: NO GROWTH
BACTERIA SPEC CULT: NO GROWTH

## 2025-08-07 ENCOUNTER — OFFICE VISIT (OUTPATIENT)
Dept: ANESTHESIOLOGY | Facility: CLINIC | Age: 60
End: 2025-08-07
Payer: MEDICARE

## 2025-08-07 VITALS — DIASTOLIC BLOOD PRESSURE: 82 MMHG | OXYGEN SATURATION: 99 % | SYSTOLIC BLOOD PRESSURE: 136 MMHG | HEART RATE: 101 BPM

## 2025-08-07 DIAGNOSIS — G89.4 CHRONIC PAIN SYNDROME: Primary | ICD-10-CM

## 2025-08-07 RX ORDER — BUPRENORPHINE 5 UG/H
1 PATCH TRANSDERMAL
Qty: 4 PATCH | Refills: 1 | Status: SHIPPED | OUTPATIENT
Start: 2025-08-07

## 2025-08-07 ASSESSMENT — PAIN SCALES - GENERAL: PAINLEVEL_OUTOF10: SEVERE PAIN (9)

## 2025-08-12 ENCOUNTER — TELEPHONE (OUTPATIENT)
Dept: ENDOCRINOLOGY | Facility: CLINIC | Age: 60
End: 2025-08-12

## 2025-08-12 ENCOUNTER — OFFICE VISIT (OUTPATIENT)
Dept: INTERNAL MEDICINE | Facility: CLINIC | Age: 60
End: 2025-08-12
Attending: PHYSICIAN ASSISTANT
Payer: MEDICARE

## 2025-08-12 ENCOUNTER — ANCILLARY PROCEDURE (OUTPATIENT)
Dept: ULTRASOUND IMAGING | Facility: CLINIC | Age: 60
End: 2025-08-12
Attending: INTERNAL MEDICINE
Payer: MEDICARE

## 2025-08-12 ENCOUNTER — DOCUMENTATION ONLY (OUTPATIENT)
Dept: INTERNAL MEDICINE | Facility: CLINIC | Age: 60
End: 2025-08-12

## 2025-08-12 ENCOUNTER — LAB (OUTPATIENT)
Dept: LAB | Facility: CLINIC | Age: 60
End: 2025-08-12
Payer: MEDICARE

## 2025-08-12 VITALS
RESPIRATION RATE: 16 BRPM | BODY MASS INDEX: 20.01 KG/M2 | HEART RATE: 90 BPM | DIASTOLIC BLOOD PRESSURE: 82 MMHG | TEMPERATURE: 98.3 F | WEIGHT: 109.4 LBS | OXYGEN SATURATION: 99 % | SYSTOLIC BLOOD PRESSURE: 143 MMHG

## 2025-08-12 DIAGNOSIS — E27.40 ADRENAL INSUFFICIENCY: ICD-10-CM

## 2025-08-12 DIAGNOSIS — R32 URINARY INCONTINENCE, UNSPECIFIED TYPE: ICD-10-CM

## 2025-08-12 DIAGNOSIS — E03.9 HYPOTHYROIDISM, UNSPECIFIED TYPE: ICD-10-CM

## 2025-08-12 DIAGNOSIS — D50.9 IRON DEFICIENCY ANEMIA, UNSPECIFIED IRON DEFICIENCY ANEMIA TYPE: ICD-10-CM

## 2025-08-12 DIAGNOSIS — M79.89 SWOLLEN LEG: Primary | ICD-10-CM

## 2025-08-12 DIAGNOSIS — R15.9 INCONTINENCE OF FECES, UNSPECIFIED FECAL INCONTINENCE TYPE: ICD-10-CM

## 2025-08-12 DIAGNOSIS — M79.89 SWOLLEN LEG: ICD-10-CM

## 2025-08-12 DIAGNOSIS — I82.492 ACUTE DEEP VEIN THROMBOSIS (DVT) OF OTHER SPECIFIED VEIN OF LEFT LOWER EXTREMITY (H): ICD-10-CM

## 2025-08-12 LAB
ALBUMIN SERPL BCG-MCNC: 3.9 G/DL (ref 3.5–5.2)
ALP SERPL-CCNC: 148 U/L (ref 40–150)
ALT SERPL W P-5'-P-CCNC: 117 U/L (ref 0–50)
ANION GAP SERPL CALCULATED.3IONS-SCNC: 14 MMOL/L (ref 7–15)
AST SERPL W P-5'-P-CCNC: 209 U/L (ref 0–45)
BASOPHILS # BLD AUTO: 0.07 10E3/UL (ref 0–0.2)
BASOPHILS NFR BLD AUTO: 0.9 %
BILIRUB SERPL-MCNC: 0.2 MG/DL
BUN SERPL-MCNC: 5.2 MG/DL (ref 8–23)
CALCIUM SERPL-MCNC: 9.1 MG/DL (ref 8.8–10.4)
CHLORIDE SERPL-SCNC: 95 MMOL/L (ref 98–107)
CREAT SERPL-MCNC: 0.38 MG/DL (ref 0.51–0.95)
CRP SERPL-MCNC: <3 MG/L
EGFRCR SERPLBLD CKD-EPI 2021: >90 ML/MIN/1.73M2
EOSINOPHIL # BLD AUTO: 0.06 10E3/UL (ref 0–0.7)
EOSINOPHIL NFR BLD AUTO: 0.8 %
ERYTHROCYTE [DISTWIDTH] IN BLOOD BY AUTOMATED COUNT: 12.1 % (ref 10–15)
ERYTHROCYTE [SEDIMENTATION RATE] IN BLOOD BY WESTERGREN METHOD: 19 MM/HR (ref 0–30)
GLUCOSE SERPL-MCNC: 525 MG/DL (ref 70–99)
HCO3 SERPL-SCNC: 27 MMOL/L (ref 22–29)
HCT VFR BLD AUTO: 36.5 % (ref 35–47)
HGB BLD-MCNC: 12.4 G/DL (ref 11.7–15.7)
IMM GRANULOCYTES # BLD: 0.03 10E3/UL
IMM GRANULOCYTES NFR BLD: 0.4 %
LYMPHOCYTES # BLD AUTO: 1.56 10E3/UL (ref 0.8–5.3)
LYMPHOCYTES NFR BLD AUTO: 20.6 %
MCH RBC QN AUTO: 30.2 PG (ref 26.5–33)
MCHC RBC AUTO-ENTMCNC: 34 G/DL (ref 31.5–36.5)
MCV RBC AUTO: 89 FL (ref 78–100)
MONOCYTES # BLD AUTO: 0.47 10E3/UL (ref 0–1.3)
MONOCYTES NFR BLD AUTO: 6.2 %
NEUTROPHILS # BLD AUTO: 5.39 10E3/UL (ref 1.6–8.3)
NEUTROPHILS NFR BLD AUTO: 71.1 %
NRBC # BLD AUTO: 0 10E3/UL
NRBC BLD AUTO-RTO: 0 /100
PLATELET # BLD AUTO: 503 10E3/UL (ref 150–450)
POTASSIUM SERPL-SCNC: 4.3 MMOL/L (ref 3.4–5.3)
PROT SERPL-MCNC: 6.9 G/DL (ref 6.4–8.3)
RADIOLOGIST FLAGS: ABNORMAL
RBC # BLD AUTO: 4.1 10E6/UL (ref 3.8–5.2)
SODIUM SERPL-SCNC: 136 MMOL/L (ref 135–145)
TSH SERPL DL<=0.005 MIU/L-ACNC: 5.72 UIU/ML (ref 0.3–4.2)
WBC # BLD AUTO: 7.58 10E3/UL (ref 4–11)

## 2025-08-12 PROCEDURE — 84443 ASSAY THYROID STIM HORMONE: CPT | Performed by: PATHOLOGY

## 2025-08-12 PROCEDURE — 80053 COMPREHEN METABOLIC PANEL: CPT | Performed by: PATHOLOGY

## 2025-08-12 PROCEDURE — 36415 COLL VENOUS BLD VENIPUNCTURE: CPT | Performed by: PATHOLOGY

## 2025-08-12 PROCEDURE — 86140 C-REACTIVE PROTEIN: CPT | Performed by: PATHOLOGY

## 2025-08-12 PROCEDURE — 85652 RBC SED RATE AUTOMATED: CPT | Performed by: PATHOLOGY

## 2025-08-12 PROCEDURE — 93971 EXTREMITY STUDY: CPT | Mod: LT | Performed by: STUDENT IN AN ORGANIZED HEALTH CARE EDUCATION/TRAINING PROGRAM

## 2025-08-12 PROCEDURE — 85025 COMPLETE CBC W/AUTO DIFF WBC: CPT | Performed by: PATHOLOGY

## 2025-08-12 RX ORDER — APIXABAN 5 MG (74)
KIT ORAL
Qty: 1 EACH | Refills: 2 | Status: SHIPPED | OUTPATIENT
Start: 2025-08-12 | End: 2025-09-11

## 2025-08-13 ENCOUNTER — PATIENT OUTREACH (OUTPATIENT)
Dept: CARE COORDINATION | Facility: CLINIC | Age: 60
End: 2025-08-13
Payer: MEDICARE

## 2025-08-19 ENCOUNTER — PATIENT OUTREACH (OUTPATIENT)
Dept: CARE COORDINATION | Facility: CLINIC | Age: 60
End: 2025-08-19
Payer: MEDICARE

## 2025-08-19 ENCOUNTER — TELEPHONE (OUTPATIENT)
Dept: INTERNAL MEDICINE | Facility: CLINIC | Age: 60
End: 2025-08-19
Payer: MEDICARE

## 2025-08-19 ENCOUNTER — MEDICAL CORRESPONDENCE (OUTPATIENT)
Dept: HEALTH INFORMATION MANAGEMENT | Facility: CLINIC | Age: 60
End: 2025-08-19
Payer: MEDICARE

## 2025-08-20 ENCOUNTER — TELEPHONE (OUTPATIENT)
Dept: INTERNAL MEDICINE | Facility: CLINIC | Age: 60
End: 2025-08-20
Payer: MEDICARE

## 2025-08-21 ENCOUNTER — DOCUMENTATION ONLY (OUTPATIENT)
Dept: INTERNAL MEDICINE | Facility: CLINIC | Age: 60
End: 2025-08-21
Payer: MEDICARE

## 2025-08-25 ENCOUNTER — TELEPHONE (OUTPATIENT)
Dept: SURGERY | Facility: CLINIC | Age: 60
End: 2025-08-25

## 2025-08-27 ENCOUNTER — TELEPHONE (OUTPATIENT)
Dept: GASTROENTEROLOGY | Facility: CLINIC | Age: 60
End: 2025-08-27

## 2025-08-27 ENCOUNTER — OFFICE VISIT (OUTPATIENT)
Dept: INTERNAL MEDICINE | Facility: CLINIC | Age: 60
End: 2025-08-27
Payer: MEDICARE

## 2025-08-27 ENCOUNTER — LAB (OUTPATIENT)
Dept: LAB | Facility: CLINIC | Age: 60
End: 2025-08-27
Payer: MEDICARE

## 2025-08-27 ENCOUNTER — PRE VISIT (OUTPATIENT)
Dept: SURGERY | Facility: CLINIC | Age: 60
End: 2025-08-27

## 2025-08-27 VITALS
TEMPERATURE: 98.2 F | OXYGEN SATURATION: 98 % | WEIGHT: 109.5 LBS | RESPIRATION RATE: 15 BRPM | DIASTOLIC BLOOD PRESSURE: 84 MMHG | HEART RATE: 87 BPM | SYSTOLIC BLOOD PRESSURE: 147 MMHG | BODY MASS INDEX: 20.03 KG/M2

## 2025-08-27 DIAGNOSIS — E89.1 POST-PANCREATECTOMY DIABETES (H): ICD-10-CM

## 2025-08-27 DIAGNOSIS — D75.839 THROMBOCYTOSIS: ICD-10-CM

## 2025-08-27 DIAGNOSIS — D75.839 THROMBOCYTOSIS: Primary | ICD-10-CM

## 2025-08-27 DIAGNOSIS — Z12.11 ENCOUNTER FOR SCREENING COLONOSCOPY: Primary | ICD-10-CM

## 2025-08-27 DIAGNOSIS — Z90.410 POST-PANCREATECTOMY DIABETES (H): ICD-10-CM

## 2025-08-27 DIAGNOSIS — I82.492 ACUTE DEEP VEIN THROMBOSIS (DVT) OF OTHER SPECIFIED VEIN OF LEFT LOWER EXTREMITY (H): ICD-10-CM

## 2025-08-27 DIAGNOSIS — E13.9 POST-PANCREATECTOMY DIABETES (H): ICD-10-CM

## 2025-08-27 DIAGNOSIS — M23.307 DEGENERATION OF MENISCUS OF LEFT KNEE: ICD-10-CM

## 2025-08-27 DIAGNOSIS — E10.649 TYPE 1 DIABETES MELLITUS WITH HYPOGLYCEMIA AND WITHOUT COMA (H): ICD-10-CM

## 2025-08-27 DIAGNOSIS — E03.9 HYPOTHYROIDISM, UNSPECIFIED TYPE: ICD-10-CM

## 2025-08-27 LAB
BASOPHILS # BLD AUTO: 0.04 10E3/UL (ref 0–0.2)
BASOPHILS NFR BLD AUTO: 0.6 %
EOSINOPHIL # BLD AUTO: 0.07 10E3/UL (ref 0–0.7)
EOSINOPHIL NFR BLD AUTO: 1.1 %
ERYTHROCYTE [DISTWIDTH] IN BLOOD BY AUTOMATED COUNT: 13.2 % (ref 10–15)
HCT VFR BLD AUTO: 33.3 % (ref 35–47)
HGB BLD-MCNC: 11 G/DL (ref 11.7–15.7)
IMM GRANULOCYTES # BLD: 0.03 10E3/UL
IMM GRANULOCYTES NFR BLD: 0.5 %
LYMPHOCYTES # BLD AUTO: 1.53 10E3/UL (ref 0.8–5.3)
LYMPHOCYTES NFR BLD AUTO: 24.4 %
MCH RBC QN AUTO: 30 PG (ref 26.5–33)
MCHC RBC AUTO-ENTMCNC: 33 G/DL (ref 31.5–36.5)
MCV RBC AUTO: 90.7 FL (ref 78–100)
MONOCYTES # BLD AUTO: 0.56 10E3/UL (ref 0–1.3)
MONOCYTES NFR BLD AUTO: 8.9 %
NEUTROPHILS # BLD AUTO: 4.05 10E3/UL (ref 1.6–8.3)
NEUTROPHILS NFR BLD AUTO: 64.5 %
NRBC # BLD AUTO: <0.03 10E3/UL
NRBC BLD AUTO-RTO: 0 /100
PLATELET # BLD AUTO: 485 10E3/UL (ref 150–450)
RBC # BLD AUTO: 3.67 10E6/UL (ref 3.8–5.2)
WBC # BLD AUTO: 6.28 10E3/UL (ref 4–11)

## 2025-08-27 PROCEDURE — 36415 COLL VENOUS BLD VENIPUNCTURE: CPT | Performed by: PATHOLOGY

## 2025-08-27 PROCEDURE — 85025 COMPLETE CBC W/AUTO DIFF WBC: CPT | Performed by: PATHOLOGY

## 2025-08-27 RX ORDER — BISACODYL 5 MG/1
TABLET, DELAYED RELEASE ORAL
Qty: 4 TABLET | Refills: 0 | Status: SHIPPED | OUTPATIENT
Start: 2025-08-27

## 2025-08-27 RX ORDER — CHLORPHENIR/PHENYLEPH/ASPIRIN 2-7.8-325
1 TABLET, EFFERVESCENT ORAL PRN
Qty: 50 STRIP | Refills: 0 | Status: SHIPPED | OUTPATIENT
Start: 2025-08-27

## 2025-08-27 RX ORDER — ASPIRIN 81 MG/1
81 TABLET, COATED ORAL DAILY
COMMUNITY

## 2025-08-27 RX ORDER — LEVOTHYROXINE SODIUM 137 UG/1
137 TABLET ORAL
Qty: 90 TABLET | Refills: 3 | Status: SHIPPED | OUTPATIENT
Start: 2025-08-27

## 2025-08-27 RX ORDER — LANCETS 28 GAUGE
EACH MISCELLANEOUS
Qty: 100 EACH | Refills: 11 | Status: SHIPPED | OUTPATIENT
Start: 2025-08-27

## 2025-08-27 RX ORDER — POLYETHYLENE GLYCOL 3350 17 G/17G
POWDER, FOR SOLUTION ORAL
Qty: 238 G | Refills: 0 | Status: SHIPPED | OUTPATIENT
Start: 2025-08-27

## 2025-08-27 ASSESSMENT — PATIENT HEALTH QUESTIONNAIRE - PHQ9
SUM OF ALL RESPONSES TO PHQ QUESTIONS 1-9: 23
10. IF YOU CHECKED OFF ANY PROBLEMS, HOW DIFFICULT HAVE THESE PROBLEMS MADE IT FOR YOU TO DO YOUR WORK, TAKE CARE OF THINGS AT HOME, OR GET ALONG WITH OTHER PEOPLE: EXTREMELY DIFFICULT
SUM OF ALL RESPONSES TO PHQ QUESTIONS 1-9: 23

## 2025-09-01 ENCOUNTER — PATIENT OUTREACH (OUTPATIENT)
Dept: CARE COORDINATION | Facility: CLINIC | Age: 60
End: 2025-09-01
Payer: MEDICARE

## 2025-09-02 ENCOUNTER — PATIENT OUTREACH (OUTPATIENT)
Dept: CARE COORDINATION | Facility: CLINIC | Age: 60
End: 2025-09-02
Payer: MEDICARE

## 2025-09-04 ENCOUNTER — THERAPY VISIT (OUTPATIENT)
Dept: SPEECH THERAPY | Facility: CLINIC | Age: 60
End: 2025-09-04
Attending: PHYSICIAN ASSISTANT
Payer: MEDICARE

## 2025-09-04 DIAGNOSIS — R13.10 DYSPHAGIA, UNSPECIFIED TYPE: ICD-10-CM

## 2025-09-04 PROCEDURE — 92610 EVALUATE SWALLOWING FUNCTION: CPT | Mod: GN | Performed by: SPEECH-LANGUAGE PATHOLOGIST

## 2025-09-04 PROCEDURE — 92526 ORAL FUNCTION THERAPY: CPT | Mod: GN | Performed by: SPEECH-LANGUAGE PATHOLOGIST

## (undated) DEVICE — SYR 50ML CATH TIP W/O NDL 309620

## (undated) DEVICE — DEVICE SUTURE PASSER 14GA WECK EFX EFXSP2

## (undated) DEVICE — Device

## (undated) DEVICE — DRAPE C-ARM W/STRAPS 42X72" 07-CA104

## (undated) DEVICE — DRAPE MAYO STAND 23X54 8337

## (undated) DEVICE — GUIDEWIRE TERUMO .035X260 3CM STR TIP GS3504

## (undated) DEVICE — GLOVE PROTEXIS POWDER FREE SMT 7.5  2D72PT75X

## (undated) DEVICE — ENDO TUBING CO2 SMARTCAP STERILE DISP 100145CO2EXT

## (undated) DEVICE — WIPE PREMOIST CLEANSING WASHCLOTHS 7988

## (undated) DEVICE — DRAPE SHEET MED 44X70" 9355

## (undated) DEVICE — LINEN TOWEL PACK X5 5464

## (undated) DEVICE — TUBE GASTROSTOMY PEG SET 24FR G22636 PEG-24-PULL-S

## (undated) DEVICE — TUBE GASTROSTOMY MIC ENFIT 18FR 8100-18

## (undated) DEVICE — NDL COUNTER 20CT 31142493

## (undated) DEVICE — GLOVE BIOGEL PI ULTRATOUCH SZ 8.0 41180

## (undated) DEVICE — SOL WATER IRRIG 1000ML BOTTLE 2F7114

## (undated) DEVICE — DRSG DRAIN 2X2" 7087

## (undated) DEVICE — CATH ANGIO JB1 GLIDE 5FRX65CM MARINER 11734103

## (undated) DEVICE — BITE BLOCK ENDO W/DENTAL RIM 54FR SBT-114-100

## (undated) DEVICE — GLOVE PROTEXIS POWDER FREE SMT 7.0  2D72PT70X

## (undated) DEVICE — PREP CHLORAPREP 26ML TINTED HI-LITE ORANGE 930815

## (undated) DEVICE — SUCTION MANIFOLD NEPTUNE 2 SYS 4 PORT 0702-020-000

## (undated) DEVICE — PACK ENDOSCOPY GI CUSTOM UMMC

## (undated) DEVICE — KIT ENDO FIRST STEP DISINFECTANT 200ML W/POUCH EP-4

## (undated) DEVICE — KNIFE HANDLE W/15 BLADE 371615

## (undated) DEVICE — DRSG PRIMAPORE 02X3" 7133

## (undated) DEVICE — INTR PEELAWAY 22FRX13CM G04096 PLVW-22.0-38

## (undated) DEVICE — PAD CHUX UNDERPAD 23X24" 7136

## (undated) DEVICE — LABEL MEDICATION SYSTEM 3303-P

## (undated) DEVICE — ENDO DEVICE LOCKING AND BIOPSY CAP M00545261

## (undated) DEVICE — GLOVE BIOGEL PI ULTRATOUCH G SZ 7.5 42175

## (undated) DEVICE — DRSG GAUZE 4X4" TRAY 6939

## (undated) DEVICE — TUBE TRANS GASTROJEJUNOSTOMY 16FRX45CM

## (undated) DEVICE — TUBE TRANS GASTROJEJUNOSTOMY ENFIT 18FRX45CM 8250-18

## (undated) DEVICE — INTRODUCER KIT GASTROSTOMY MIC G 22FR 98433

## (undated) DEVICE — SU VICRYL 0 TIE 12X18" J906G

## (undated) DEVICE — WIRE GUIDE 0.025"X150CM TERUMO GLIDEWIRE STR GR2501

## (undated) DEVICE — DECANTER BAG 2002S

## (undated) DEVICE — GLIDEWIRE TERUMO .035X180 ANG STIFF GS3508

## (undated) RX ORDER — OXYCODONE HYDROCHLORIDE 5 MG/1
TABLET ORAL
Status: DISPENSED
Start: 2025-05-02

## (undated) RX ORDER — FENTANYL CITRATE 50 UG/ML
INJECTION, SOLUTION INTRAMUSCULAR; INTRAVENOUS
Status: DISPENSED
Start: 2025-04-17

## (undated) RX ORDER — HYDROMORPHONE HCL IN WATER/PF 6 MG/30 ML
PATIENT CONTROLLED ANALGESIA SYRINGE INTRAVENOUS
Status: DISPENSED
Start: 2025-05-02

## (undated) RX ORDER — HYDROMORPHONE HCL IN WATER/PF 6 MG/30 ML
PATIENT CONTROLLED ANALGESIA SYRINGE INTRAVENOUS
Status: DISPENSED
Start: 2025-04-17

## (undated) RX ORDER — SODIUM CHLORIDE 9 MG/ML
INJECTION, SOLUTION INTRAVENOUS
Status: DISPENSED
Start: 2025-05-02

## (undated) RX ORDER — HYDROMORPHONE HCL IN WATER/PF 6 MG/30 ML
PATIENT CONTROLLED ANALGESIA SYRINGE INTRAVENOUS
Status: DISPENSED
Start: 2025-08-01

## (undated) RX ORDER — FENTANYL CITRATE 50 UG/ML
INJECTION, SOLUTION INTRAMUSCULAR; INTRAVENOUS
Status: DISPENSED
Start: 2025-08-01

## (undated) RX ORDER — LIDOCAINE HYDROCHLORIDE 10 MG/ML
INJECTION, SOLUTION EPIDURAL; INFILTRATION; INTRACAUDAL; PERINEURAL
Status: DISPENSED
Start: 2025-07-31

## (undated) RX ORDER — FENTANYL CITRATE 50 UG/ML
INJECTION, SOLUTION INTRAMUSCULAR; INTRAVENOUS
Status: DISPENSED
Start: 2025-05-02

## (undated) RX ORDER — FENTANYL CITRATE 50 UG/ML
INJECTION, SOLUTION INTRAMUSCULAR; INTRAVENOUS
Status: DISPENSED
Start: 2019-06-12

## (undated) RX ORDER — CEFAZOLIN SODIUM/WATER 2 G/20 ML
SYRINGE (ML) INTRAVENOUS
Status: DISPENSED
Start: 2025-05-02

## (undated) RX ORDER — LIDOCAINE HYDROCHLORIDE 20 MG/ML
INJECTION, SOLUTION EPIDURAL; INFILTRATION; INTRACAUDAL; PERINEURAL
Status: DISPENSED
Start: 2017-08-01

## (undated) RX ORDER — FENTANYL CITRATE 50 UG/ML
INJECTION, SOLUTION INTRAMUSCULAR; INTRAVENOUS
Status: DISPENSED
Start: 2017-08-01

## (undated) RX ORDER — PROPOFOL 10 MG/ML
INJECTION, EMULSION INTRAVENOUS
Status: DISPENSED
Start: 2017-08-01

## (undated) RX ORDER — LIDOCAINE HYDROCHLORIDE 20 MG/ML
JELLY TOPICAL
Status: DISPENSED
Start: 2025-07-31

## (undated) RX ORDER — GLYCOPYRROLATE 0.2 MG/ML
INJECTION, SOLUTION INTRAMUSCULAR; INTRAVENOUS
Status: DISPENSED
Start: 2017-08-01

## (undated) RX ORDER — SIMETHICONE 40MG/0.6ML
SUSPENSION, DROPS(FINAL DOSAGE FORM)(ML) ORAL
Status: DISPENSED
Start: 2017-08-01

## (undated) RX ORDER — SIMETHICONE 20 MG/.3ML
EMULSION ORAL
Status: DISPENSED
Start: 2019-06-12

## (undated) RX ORDER — FENTANYL CITRATE-0.9 % NACL/PF 10 MCG/ML
PLASTIC BAG, INJECTION (ML) INTRAVENOUS
Status: DISPENSED
Start: 2025-08-01

## (undated) RX ORDER — ACETAMINOPHEN 325 MG/1
TABLET ORAL
Status: DISPENSED
Start: 2025-05-02